# Patient Record
Sex: FEMALE | Race: WHITE
[De-identification: names, ages, dates, MRNs, and addresses within clinical notes are randomized per-mention and may not be internally consistent; named-entity substitution may affect disease eponyms.]

---

## 2019-01-08 ENCOUNTER — HOSPITAL ENCOUNTER (INPATIENT)
Dept: HOSPITAL 72 - EMR | Age: 59
LOS: 4 days | Discharge: NURSING, REHAB OR LONG-TERM CARE | DRG: 315 | End: 2019-01-12
Payer: MEDICARE

## 2019-01-08 VITALS — SYSTOLIC BLOOD PRESSURE: 108 MMHG | DIASTOLIC BLOOD PRESSURE: 49 MMHG

## 2019-01-08 VITALS — BODY MASS INDEX: 22.68 KG/M2 | HEIGHT: 63 IN | WEIGHT: 128 LBS

## 2019-01-08 DIAGNOSIS — I99.8: ICD-10-CM

## 2019-01-08 DIAGNOSIS — R00.1: ICD-10-CM

## 2019-01-08 DIAGNOSIS — M62.461: ICD-10-CM

## 2019-01-08 DIAGNOSIS — G40.909: ICD-10-CM

## 2019-01-08 DIAGNOSIS — I73.9: ICD-10-CM

## 2019-01-08 DIAGNOSIS — Q90.9: ICD-10-CM

## 2019-01-08 DIAGNOSIS — I45.89: ICD-10-CM

## 2019-01-08 DIAGNOSIS — E03.9: ICD-10-CM

## 2019-01-08 DIAGNOSIS — I95.9: Primary | ICD-10-CM

## 2019-01-08 DIAGNOSIS — Z74.01: ICD-10-CM

## 2019-01-08 DIAGNOSIS — M62.462: ICD-10-CM

## 2019-01-08 DIAGNOSIS — Z43.1: ICD-10-CM

## 2019-01-08 DIAGNOSIS — L89.159: ICD-10-CM

## 2019-01-08 PROCEDURE — 84443 ASSAY THYROID STIM HORMONE: CPT

## 2019-01-08 PROCEDURE — 86140 C-REACTIVE PROTEIN: CPT

## 2019-01-08 PROCEDURE — 93926 LOWER EXTREMITY STUDY: CPT

## 2019-01-08 PROCEDURE — 96360 HYDRATION IV INFUSION INIT: CPT

## 2019-01-08 PROCEDURE — 82962 GLUCOSE BLOOD TEST: CPT

## 2019-01-08 PROCEDURE — 93306 TTE W/DOPPLER COMPLETE: CPT

## 2019-01-08 PROCEDURE — 83880 ASSAY OF NATRIURETIC PEPTIDE: CPT

## 2019-01-08 PROCEDURE — 82550 ASSAY OF CK (CPK): CPT

## 2019-01-08 PROCEDURE — 82553 CREATINE MB FRACTION: CPT

## 2019-01-08 PROCEDURE — 93971 EXTREMITY STUDY: CPT

## 2019-01-08 PROCEDURE — 87081 CULTURE SCREEN ONLY: CPT

## 2019-01-08 PROCEDURE — 94664 DEMO&/EVAL PT USE INHALER: CPT

## 2019-01-08 PROCEDURE — 80061 LIPID PANEL: CPT

## 2019-01-08 PROCEDURE — 96361 HYDRATE IV INFUSION ADD-ON: CPT

## 2019-01-08 PROCEDURE — 36415 COLL VENOUS BLD VENIPUNCTURE: CPT

## 2019-01-08 PROCEDURE — 84481 FREE ASSAY (FT-3): CPT

## 2019-01-08 PROCEDURE — 99291 CRITICAL CARE FIRST HOUR: CPT

## 2019-01-08 PROCEDURE — 93005 ELECTROCARDIOGRAM TRACING: CPT

## 2019-01-08 PROCEDURE — 85730 THROMBOPLASTIN TIME PARTIAL: CPT

## 2019-01-08 PROCEDURE — 85610 PROTHROMBIN TIME: CPT

## 2019-01-08 PROCEDURE — 71045 X-RAY EXAM CHEST 1 VIEW: CPT

## 2019-01-08 PROCEDURE — 84484 ASSAY OF TROPONIN QUANT: CPT

## 2019-01-08 PROCEDURE — 84439 ASSAY OF FREE THYROXINE: CPT

## 2019-01-08 PROCEDURE — 80053 COMPREHEN METABOLIC PANEL: CPT

## 2019-01-08 PROCEDURE — 85025 COMPLETE CBC W/AUTO DIFF WBC: CPT

## 2019-01-09 VITALS — DIASTOLIC BLOOD PRESSURE: 62 MMHG | SYSTOLIC BLOOD PRESSURE: 99 MMHG

## 2019-01-09 VITALS — SYSTOLIC BLOOD PRESSURE: 109 MMHG | DIASTOLIC BLOOD PRESSURE: 55 MMHG

## 2019-01-09 VITALS — DIASTOLIC BLOOD PRESSURE: 55 MMHG | SYSTOLIC BLOOD PRESSURE: 104 MMHG

## 2019-01-09 VITALS — DIASTOLIC BLOOD PRESSURE: 62 MMHG | SYSTOLIC BLOOD PRESSURE: 101 MMHG

## 2019-01-09 VITALS — SYSTOLIC BLOOD PRESSURE: 98 MMHG | DIASTOLIC BLOOD PRESSURE: 51 MMHG

## 2019-01-09 VITALS — DIASTOLIC BLOOD PRESSURE: 57 MMHG | SYSTOLIC BLOOD PRESSURE: 121 MMHG

## 2019-01-09 VITALS — SYSTOLIC BLOOD PRESSURE: 100 MMHG | DIASTOLIC BLOOD PRESSURE: 63 MMHG

## 2019-01-09 LAB
ADD MANUAL DIFF: NO
ALBUMIN SERPL-MCNC: 2.5 G/DL (ref 3.4–5)
ALBUMIN/GLOB SERPL: 0.5 {RATIO} (ref 1–2.7)
ALP SERPL-CCNC: 58 U/L (ref 46–116)
ALT SERPL-CCNC: 20 U/L (ref 12–78)
ANION GAP SERPL CALC-SCNC: 7 MMOL/L (ref 5–15)
APTT BLD: 22 SEC (ref 23–33)
AST SERPL-CCNC: 24 U/L (ref 15–37)
BASOPHILS NFR BLD AUTO: 1.6 % (ref 0–2)
BILIRUB SERPL-MCNC: 0.3 MG/DL (ref 0.2–1)
BUN SERPL-MCNC: 9 MG/DL (ref 7–18)
CALCIUM SERPL-MCNC: 9.2 MG/DL (ref 8.5–10.1)
CHLORIDE SERPL-SCNC: 107 MMOL/L (ref 98–107)
CK MB SERPL-MCNC: 0.4 NG/ML (ref 0–3.6)
CK SERPL-CCNC: 45 U/L (ref 26–140)
CO2 SERPL-SCNC: 24 MMOL/L (ref 21–32)
CREAT SERPL-MCNC: 0.6 MG/DL (ref 0.55–1.3)
EOSINOPHIL NFR BLD AUTO: 1.5 % (ref 0–3)
ERYTHROCYTE [DISTWIDTH] IN BLOOD BY AUTOMATED COUNT: 13.9 % (ref 11.6–14.8)
GLOBULIN SER-MCNC: 5 G/DL
HCT VFR BLD CALC: 45.8 % (ref 37–47)
HGB BLD-MCNC: 15 G/DL (ref 12–16)
INR PPP: 1 (ref 0.9–1.1)
LYMPHOCYTES NFR BLD AUTO: 49.9 % (ref 20–45)
MCV RBC AUTO: 104 FL (ref 80–99)
MONOCYTES NFR BLD AUTO: 9.8 % (ref 1–10)
NEUTROPHILS NFR BLD AUTO: 37.2 % (ref 45–75)
PLATELET # BLD: 304 K/UL (ref 150–450)
POTASSIUM SERPL-SCNC: 4.8 MMOL/L (ref 3.5–5.1)
RBC # BLD AUTO: 4.38 M/UL (ref 4.2–5.4)
SODIUM SERPL-SCNC: 138 MMOL/L (ref 136–145)
WBC # BLD AUTO: 8.4 K/UL (ref 4.8–10.8)

## 2019-01-09 RX ADMIN — LEVETIRACETAM SCH MG: 100 SOLUTION ORAL at 21:00

## 2019-01-09 RX ADMIN — ASPIRIN 81 MG SCH MG: 81 TABLET ORAL at 08:57

## 2019-01-09 RX ADMIN — HEPARIN SODIUM SCH UNITS: 5000 INJECTION INTRAVENOUS; SUBCUTANEOUS at 21:00

## 2019-01-09 RX ADMIN — INSULIN ASPART SCH UNITS: 100 INJECTION, SOLUTION INTRAVENOUS; SUBCUTANEOUS at 12:00

## 2019-01-09 RX ADMIN — DIVALPROEX SODIUM SCH MG: 125 CAPSULE ORAL at 21:00

## 2019-01-09 RX ADMIN — INSULIN ASPART SCH UNITS: 100 INJECTION, SOLUTION INTRAVENOUS; SUBCUTANEOUS at 17:41

## 2019-01-09 RX ADMIN — HEPARIN SODIUM SCH UNITS: 5000 INJECTION INTRAVENOUS; SUBCUTANEOUS at 08:58

## 2019-01-09 NOTE — CARDIOLOGY PROGRESS NOTE
Assessment/Plan


Assessment/Plan


[id number]





Objective





Last 24 Hour Vital Signs








  Date Time  Temp Pulse Resp B/P (MAP) Pulse Ox O2 Delivery O2 Flow Rate FiO2


 


1/9/19 16:00  53      


 


1/9/19 12:00  56      


 


1/9/19 12:00 97.2 71 20 109/55 (73) 95   


 


1/9/19 10:07 98.3 63 16 104/55 100 Room Air  


 


1/9/19 09:01      Room Air  


 


1/9/19 08:41  57      


 


1/9/19 08:07 97.0 62 21 121/57 (78) 97   


 


1/9/19 06:15 98.3 63 16 104/55 100 Room Air  


 


1/9/19 04:30 98.7 67 18 100/63 99 Room Air  


 


1/9/19 02:15 98.3 61 21 98/51 99 Room Air  


 


1/9/19 01:00 98.1 63 18 99/62 99 Room Air  


 


1/9/19 00:15 98.1 60 14 101/62 99 Room Air  


 


1/8/19 22:00 97.5 58 14 108/49 97 Room Air  











Laboratory Tests








Test


  1/9/19


04:00


 


White Blood Count


  8.4 K/UL


(4.8-10.8)


 


Red Blood Count


  4.38 M/UL


(4.20-5.40)


 


Hemoglobin


  15.0 G/DL


(12.0-16.0)


 


Hematocrit


  45.8 %


(37.0-47.0)


 


Mean Corpuscular Volume


  104 FL (80-99)


H


 


Mean Corpuscular Hemoglobin


  34.3 PG


(27.0-31.0)  H


 


Mean Corpuscular Hemoglobin


Concent 32.8 G/DL


(32.0-36.0)


 


Red Cell Distribution Width


  13.9 %


(11.6-14.8)


 


Platelet Count


  304 K/UL


(150-450)


 


Mean Platelet Volume


  6.2 FL


(6.5-10.1)  L


 


Neutrophils (%) (Auto)


  37.2 %


(45.0-75.0)  L


 


Lymphocytes (%) (Auto)


  49.9 %


(20.0-45.0)  H


 


Monocytes (%) (Auto)


  9.8 %


(1.0-10.0)


 


Eosinophils (%) (Auto)


  1.5 %


(0.0-3.0)


 


Basophils (%) (Auto)


  1.6 %


(0.0-2.0)


 


Prothrombin Time


  10.4 SEC


(9.30-11.50)


 


Prothromb Time International


Ratio 1.0 (0.9-1.1)  


 


 


Activated Partial


Thromboplast Time 22 SEC (23-33)


L


 


Sodium Level


  138 MMOL/L


(136-145)


 


Potassium Level


  4.8 MMOL/L


(3.5-5.1)


 


Chloride Level


  107 MMOL/L


()


 


Carbon Dioxide Level


  24 MMOL/L


(21-32)


 


Anion Gap


  7 mmol/L


(5-15)


 


Blood Urea Nitrogen


  9 mg/dL (7-18)


 


 


Creatinine


  0.6 MG/DL


(0.55-1.30)


 


Estimat Glomerular Filtration


Rate > 60 mL/min


(>60)


 


Glucose Level


  76 MG/DL


()


 


Calcium Level


  9.2 MG/DL


(8.5-10.1)


 


Total Bilirubin


  0.3 MG/DL


(0.2-1.0)


 


Aspartate Amino Transf


(AST/SGOT) 24 U/L (15-37)


 


 


Alanine Aminotransferase


(ALT/SGPT) 20 U/L (12-78)


 


 


Alkaline Phosphatase


  58 U/L


()


 


Total Creatine Kinase


  45 U/L


()


 


Creatine Kinase MB


  0.4 NG/ML


(0.0-3.6)


 


Creatine Kinase MB Relative


Index 0.8  


 


 


Troponin I


  0.005 ng/mL


(0.000-0.056)


 


Pro-B-Type Natriuretic Peptide


  65 pg/mL


(0-125)


 


Total Protein


  7.5 G/DL


(6.4-8.2)


 


Albumin


  2.5 G/DL


(3.4-5.0)  L


 


Globulin 5.0 g/dL  


 


Albumin/Globulin Ratio


  0.5 (1.0-2.7)


L

















Constantine Jorgensen MD Jan 9, 2019 20:16

## 2019-01-09 NOTE — DIAGNOSTIC IMAGING REPORT
Indication: Left leg pain

 

Technique: Limited study performed, only the left common femoral vein, downstream

superficial femoral vein could be interrogated due to patient being very contracted

 

Comparison: none

 

Findings: In the interrogated veins, no evidence of intraluminal thrombus is

demonstrated. Normal compressibility. Normal phasic Doppler waveforms.,

 

Impression: Very limited exam. No evidence of common femoral or downstream femoral

venous thrombosis demonstrated

 

This agrees with the preliminary [facility]

[facility].

## 2019-01-09 NOTE — CARDIOLOGY REPORT
--------------- APPROVED REPORT --------------





EKG Measurement

Heart [id number]

GA 

[id number]

[id number]

[id number]

[id number]





Normal sinus rhythm

Low voltage QRS

Right bundle branch block

Abnormal ECG

## 2019-01-09 NOTE — EMERGENCY ROOM REPORT
History of Present Illness


General


Chief Complaint:  General Complaint


Source:  Medical Record, EMS





Present Illness


HPI


58-year-old female presents ED for evaluation.  Patient was sent in for 

"cardiology evaluation".  Coming from skilled nursing facility.  Patient has 

cognitive delay.  Per nursing staff patient was sent in because patient needs 

cardiology evaluation and patient has peripheral arterial disease.  Upon 

arrival patient showing no signs of distress.  No reported chest pain or 

shortness of breath.  Patient initially hypotensive on arrival.  No fevers or 

chills.  No other aggravating relieving factors.  No other associated symptoms


Allergies:  


Coded Allergies:  


     No Known Allergies (Unverified , 1/8/19)





Patient History


Past Medical History:  seizures


Past Surgical History:  none


Pertinent Family History:  none


Social History:  Denies: smoking, alcohol use, drug use


Pregnant Now:  No


Immunizations:  UTD


Reviewed Nursing Documentation:  PMH: Agreed; PSxH: Agreed





Nursing Documentation-PMH


Hx Diabetes:  No - Hypothyroid


Hx Seizures:  Yes





Review of Systems


All Other Systems:  limited





Physical Exam





Vital Signs








  Date Time  Temp Pulse Resp B/P (MAP) Pulse Ox O2 Delivery O2 Flow Rate FiO2


 


1/8/19 19:35 97.5 56 18 91/56 94 Room Air  








Sp02 EP Interpretation:  reviewed, normal


General Appearance:  other - cognitive delay


Head:  normocephalic


Eyes:  bilateral eye normal inspection, bilateral eye PERRL


ENT:  normal ENT inspection


Neck:  normal inspection


Respiratory:  chest non-tender, lungs clear, normal breath sounds, speaking 

full sentences


Cardiovascular #1:  regular rate, rhythm, no edema


Gastrointestinal:  normal inspection


Rectal:  deferred


Genitourinary:  no CVA tenderness


Musculoskeletal:  other - contracted lower extremities


Neurologic:  other - cognitive delay


Psychiatric:  other - cognitive delay


Skin:  normal inspection


Lymphatic:  normal inspection





Procedures


Critical Care Time


Critical Care Time


i.  I feel this is a highly complex case requiring extensive working including 

EKG/Rhythm strip, Xray/CT/US, Blood/urine lab work, repeat exams while in ED, 


and administration of strong opiates/narcotics for pain control, admission to 

hospital or close patient follow up.  





Total time: 45 min bedside evaluation and treatment excludes procedures (EKG). 


Reason for critical care:  hypotension


Possible complications: hypotension, hypertension, MI, shock, arrhythmias, 

metabolic acidosis, end organ damage, respiratory failure. 


Interventions: labs, IVFs, EKG, CXR, venous/arterial duplex. 


Course: Patient sent in for cardiology evaluation.  Also noted have history of 

peripheral arterial disease.  Pulses noted in contracted extremities.  Venous 

and arterial duplex limited due to contracted limbs.  Difficult IV access 

obtained.  Labs unremarkable.  Patient hypotensive.  Responded with IV fluids


Consultations: nursing staff, EMS, family 


Performed by: Dr Oliveira


Tolerated well condition = serious





j.  because of unstable vital signs this patient had a condition that could 

potentially threaten life or limb.  I feel this is a critical patient who 

required my full attention while patient was considered critical.  Total 

Critical Care Time excluding procedures was greater than 35 minutes





Medical Decision Making


Diagnostic Impression:  


 Primary Impression:  


 Peripheral arterial disease


 Additional Impression:  


 Hypotension


 Qualified Codes:  I95.9 - Hypotension, unspecified


ER Course


Hospital Course 


57 yo F presents to ED for 'cardiology evaluation'.  also noted to have 

peripheral arterial disease





Differential diagnoses include: MI/unstable angina, ACS, arterial occlusion 





Clinical course


Patient placed on stretcher. on cardiac monitor. After initial history and 

physical I ordered labs, EKG, chest x-ray, venous/arterial duplex





labs reviewed- no leukocytosis, hb/hct stable, electrolyes ok, trop negative


EKG - NSR, no acute ischemic changes interpreted by me 


Chest x-ray- no acute process





venous and arterial duplex limited study due to contracted extremities.  there 

is a distal pulse in both extremities





initially hypotensive, but responded to IV hydration 





Case discussed with Dr. Cherry  and he agreed to accept the patient to his 

service for further care and support





I.  I feel this is a highly complex case requiring extensive working including 

EKG/Rhythm strip, Xray/CT/US, Blood/urine lab work, repeat exams while in ED, 

and administration of strong opiates/narcotics for pain control, admission to 

hospital or close patient follow up.  





Diagnosis - peripheral arterial disease, hypotension 





admitted to telemetry in serious condition





Labs








Test


  1/9/19


04:00


 


White Blood Count


  8.4 K/UL


(4.8-10.8)


 


Red Blood Count


  4.38 M/UL


(4.20-5.40)


 


Hemoglobin


  15.0 G/DL


(12.0-16.0)


 


Hematocrit


  45.8 %


(37.0-47.0)


 


Mean Corpuscular Volume 104 FL (80-99) 


 


Mean Corpuscular Hemoglobin


  34.3 PG


(27.0-31.0)


 


Mean Corpuscular Hemoglobin


Concent 32.8 G/DL


(32.0-36.0)


 


Red Cell Distribution Width


  13.9 %


(11.6-14.8)


 


Platelet Count


  304 K/UL


(150-450)


 


Mean Platelet Volume


  6.2 FL


(6.5-10.1)


 


Neutrophils (%) (Auto)


  37.2 %


(45.0-75.0)


 


Lymphocytes (%) (Auto)


  49.9 %


(20.0-45.0)


 


Monocytes (%) (Auto)


  9.8 %


(1.0-10.0)


 


Eosinophils (%) (Auto)


  1.5 %


(0.0-3.0)


 


Basophils (%) (Auto)


  1.6 %


(0.0-2.0)


 


Prothrombin Time


  10.4 SEC


(9.30-11.50)


 


Prothromb Time International


Ratio 1.0 (0.9-1.1) 


 


 


Activated Partial


Thromboplast Time 22 SEC (23-33) 


 


 


Sodium Level


  138 MMOL/L


(136-145)


 


Potassium Level


  4.8 MMOL/L


(3.5-5.1)


 


Chloride Level


  107 MMOL/L


()


 


Carbon Dioxide Level


  24 MMOL/L


(21-32)


 


Anion Gap


  7 mmol/L


(5-15)


 


Blood Urea Nitrogen 9 mg/dL (7-18) 


 


Creatinine


  0.6 MG/DL


(0.55-1.30)


 


Estimat Glomerular Filtration


Rate > 60 mL/min


(>60)


 


Glucose Level


  76 MG/DL


()


 


Calcium Level


  9.2 MG/DL


(8.5-10.1)


 


Total Bilirubin


  0.3 MG/DL


(0.2-1.0)


 


Aspartate Amino Transf


(AST/SGOT) 24 U/L (15-37) 


 


 


Alanine Aminotransferase


(ALT/SGPT) 20 U/L (12-78) 


 


 


Alkaline Phosphatase


  58 U/L


()


 


Total Creatine Kinase


  45 U/L


()


 


Creatine Kinase MB


  0.4 NG/ML


(0.0-3.6)


 


Creatine Kinase MB Relative


Index 0.8 


 


 


Troponin I


  0.005 ng/mL


(0.000-0.056)


 


Pro-B-Type Natriuretic Peptide


  65 pg/mL


(0-125)


 


Total Protein


  7.5 G/DL


(6.4-8.2)


 


Albumin


  2.5 G/DL


(3.4-5.0)


 


Globulin 5.0 g/dL 


 


Albumin/Globulin Ratio 0.5 (1.0-2.7) 








EKG Diagnostic Results


Rate:  normal


Rhythm:  NSR


ST Segments:  no acute changes


ASA given to the pt in ED:  No





Rhythm Strip Diag. Results


EP Interpretation:  yes


Rhythm:  NSR, no PVC's, no ectopy





Chest X-Ray Diagnostic Results


Chest X-Ray Diagnostic Results :  


   Chest X-Ray Ordered:  Yes


   # of Views/Limited/Complete:  1 View


   Indication:  Other


   EP Interpretation:  Yes


   Interpretation:  no consolidation, no effusion, no pneumothorax, no acute 

cardiopulmonary disease


   Impression:  No acute disease


   Electronically Signed by:  Electronically signed by Gómez Oliveira MD





CT/MRI/US Diagnostic Results


CT/MRI/US Diagnostic Results :  


   Imaging Test Ordered:  venous and arterial duplex


   Impression


limited due to contracted extremities. patent flow in L thigh. unable to 

properly evaluate distal LLE and R leg.





Last Vital Signs








  Date Time  Temp Pulse Resp B/P (MAP) Pulse Ox O2 Delivery O2 Flow Rate FiO2


 


1/9/19 02:15 98.3 61 21 98/51 99 Room Air  








Status:  improved


Disposition:  ADMITTED AS INPATIENT


Condition:  Serious


Referrals:  


NON PHYSICIAN (PCP)











Gómez Oliveira MD Jan 9, 2019 05:39

## 2019-01-09 NOTE — DIAGNOSTIC IMAGING REPORT
Indication: Left leg pain

 

Technique: Grayscale and duplex imaging of the left lower extremity arteries. Note

that only limited images could be obtained, of the common femoral and proximal to mid

superficial femoral artery, patient contracture.

 

Comparison: none

 

Findings: Doppler waveforms at all visualized levels are biphasic with sharp systolic

peaks. Color Doppler demonstrates normal luminal patency.

 

Impression: Limited exam, as described No evidence of lower extremity arterial

insufficiency to the level of the mid superficial femoral artery. Distal disease not

excludable

## 2019-01-09 NOTE — DIAGNOSTIC IMAGING REPORT
Indication: Chest pain

 

Technique: One view of the chest

 

Comparison: none

 

Findings: Patient's chin obscures the upper mediastinum. The lungs and pleural spaces

are clear. Heart size is normal.

 

Impression: No acute process

## 2019-01-09 NOTE — CONSULTATION
DATE OF CONSULTATION:  01/09/2019

 CARDIAC CONSULTATION



CONSULTING PHYSICIAN:  Constantine Jorgensen M.D.



REFERRING PHYSICIAN:  Chano Cherry M.D.



REASON FOR EVALUATION:  Possible vascular insufficiency.



HISTORY OF PRESENT ILLNESS:  This is a middle-aged female, developmentally

delayed.  The patient is a resident of convalescent facility and was

transferred because of possibility of vascular insufficiency of the lower

extremities.  Consultation was requested.  The patient is not able to

provide any meaningful history whatsoever.



PAST MEDICAL HISTORY:  Positive for Down syndrome and _____ malnutrition,

status post PEG, peripheral vascular disease, sacral decubitus ulcers

being documented as part of her diagnoses.



MEDICATIONS:  Include Ativan, calcium, divalproex, Colace, Keppra,

Synthroid, MiraLAX, multivitamin, Robitussin, Tylenol, vitamin C, vitamin

D, and no other medical information is available.



ALLERGIES:  The patient is not allergic to any medications.



SOCIAL HISTORY:  She does not smoke or drink.  She lives in a convalescent

facility.



PHYSICAL EXAMINATION:

GENERAL:  Shows to be a middle-aged female, in no respiratory distress.

Poor dental hygiene.

VITAL SIGNS:  Blood pressure is 109/55 with heart rate between 53 and 71,

temperature 97.2.

NECK:  Supple.

LUNGS:  Clear to auscultation.

CARDIAC:  Regular rate and rhythm.

ABDOMEN:  Soft.  Difficult to examine because of contracted lower

extremities over the abdomen.

EXTREMITIES:  Contracted severely that there are significant abnormalities

of the toenail.  The patient does have palpable pulses in dorsalis pedis

bilaterally.

NEUROLOGICAL:  Awake and responsive.  Not communicative.



LABORATORY VALUES:  An echocardiogram was performed and showed ejection

fraction being normal.  No significant valve pathology was noted.  EKG

shows sinus rhythm, but with right bundle-branch conduction defect with

leftward axis.  No ST-T wave abnormalities.  Venous duplex study of the

lower extremities, limited views, does not show any significant problems

of the proximal veins of the lower extremities and arterial duplex of

lower extremity did not show inflow disease in the proximal portion of the

arteries, distal portion was not amenable to visualization secondary to

contractures.  A chest x-ray was performed in the emergency room that

showed no evidence of any acute process.  INR 1, PTT of 22.  Sodium is

138, potassium 4.0, chloride 107, bicarbonate 24, BUN of 9, creatinine

0.8.  Troponin 0.05.  ProBNP is only 65.  White count 8.4, hemoglobin 15,

and platelet count 304,000.



ASSESSMENT AND PLAN:

1. Right bundle-branch conduction defect.

2. Sinus bradycardia.

3. Severely contracted lower extremities.

4. Down syndrome.

5. History of sacral decubitus ulcers.



Dr. Cherry, this patient was seen in cardiac consultation.  Although

venous and arterial duplex of lower extremities are difficult to perform,

she does have palpable pulses of the lower extremities.  Her

echocardiogram appears to be normal LV function.  EKG shows _____ sinus

bradycardia, but no significant abnormalities are noted.  I have no

further recommendations from cardiac point of view at this time.

Discharge planning as per yourself.









  ______________________________________________

  Constantine Jorgensen M.D.





DR:  Anthony

D:  01/09/2019 20:04

T:  01/09/2019 21:41

JOB#:  [id number]

CC:

## 2019-01-09 NOTE — CARDIOLOGY REPORT
--------------- APPROVED REPORT --------------





EXAM: Two-dimensional and M-mode echocardiogram with Doppler and color Doppler.



M-Mode DIMENSIONS 

IVSd1.1 (0.7-1.1cm)Left Atrium (MM)2.5 (1.6-4.0cm)

LVDd5.7 (3.5-5.6cm)Aortic Root3.5 (2.0-3.7cm)

PWd0.8 (0.7-1.1cm)Aortic Cusp Exc.1.8 (1.5-2.0cm)

IVSs1.4 cm

LVDs3.7 (2.5-4.0cm)

PWs1.3 cm





Technically difficult study due to poor  acoustical windows.

Normal left ventricular chamber size, systolic function and wall motion to extent 

visualized.

Left ventricular ejection fraction estimated to be  55-60 %.

No evidence of left ventricular hypertrophy.

 Anterior Echo-free space, may be due to pericardial fat or effusion.

All other cardiac chamber sizes are within normal limits. 

Focal aortic valve sclerosis with adequate cusp excursion.

Thickened mitral valve leaflets with normal excursion.

Mitral annulus and aortic root calcification.

Pulmonic valve not well visualized.

Normal tricuspid valve structure. 

IVC at normal size with physiologic collapse.



A  color flow and spectral Doppler study was performed and revealed:

No aortic regurgitation.

Trace  mitral regurgitation.

Mitral diastolic velocities suggest reduced left ventricular relaxation c/w mild LV 

diastolic dysfunction (Grade I ). 

Trace  tricuspid regurgitation.

Tricuspid  systolic velocities suggests peak right ventricular systolic pressure of 10   

mmHg.

No pulmonic regurgitation present.

## 2019-01-09 NOTE — HISTORY AND PHYSICAL
History of Present Illness


General


Date patient seen:  Jan 9, 2019


Reason for Hospitalization:  General Complaint





Present Illness


HPI


58 year old female with hx trisomy 21, PEG feeding tube, seizures, hypothyroid, 

bed bound, nursing home resident brought in by ambulance from nursing home for 

cardiac and vascular evaluation. Pt cant give any history. It's unknown what 

triggered the transfer to ER for cardiac evaluation.  Pt was hypotensive in ER 

, therefore she is admitted to telemetry for further work up.


Allergies:  


Coded Allergies:  


     No Known Allergies (Unverified , 1/8/19)





Medication History


Scheduled


Ascorbic Acid* (Vitamin C*), 500 MG GT DAILY, (Reported)


Docusate Sodium* (Docusate Sodium*), 100 MG GT TWICE A DAY, (Reported)


Levetiracetam (Keppra), 750 MG GT TWICE A DAY, (Reported)


Levothyroxine Sodium* (Levothyroxine Sodium*), 75 MCG GT DAILY, (Reported)


Lorazepam* (Ativan*), 0.5 MG ORAL THREE TIMES A DAY, (Reported)


Magnesium Hydroxide* (Milk Of Magnesia*), 30 ML ORAL DAILY, (Reported)


Multivitamin With Minerals (Multivitamins With Minerals*), 1 TAB ORAL DAILY, (

Reported)


Polyethylene Glycol 3350* (Miralax*), 17 GM ORAL DAILY, (Reported)


Vitamin D (Vitamin D3), 100 UNITS GT DAILY, (Reported)





Scheduled PRN


Acetaminophen* (Acetaminophen 325MG Tablet*), 650 MG GT Q4H PRN for Mild Pain (

Pain Scale 1-3), (Reported)


Guaifenesin/Codeine Phos* (Robitussin Ac*), 1 TSP ORAL Q4H PRN for For Cough, (

Reported)





Miscellaneous Medications


Calcium Carbonate/Vitamin D3 (Calcium 500 + D Tablet), 1 EACH PO, (Reported)


Divalproex Sodium (Depakote Sprinkle), 500 MG PO, (Reported)





Patient History


Healthcare decision maker





Resuscitation status


Full Code


Advanced Directive on File








Past Medical/Surgical History


Past Medical/Surgical History:  


(1) Trisomy 21, Down syndrome


(2) Hypothyroidism


(3) Seizure disorder





Review of Systems


All Other Systems:  negative except mentioned in HPI





Physical Exam


General Appearance:  cachetic


Lines, tubes and drains:  peripheral


HEENT:  normocephalic


Neck:  non-tender, normal alignment


Respiratory/Chest:  chest wall non-tender, lungs clear


Cardiovascular/Chest:  normal peripheral pulses, normal rate


Abdomen:  normal bowel sounds, non tender


Genitourinary/Rectal:  normal genital exam


Extremities:  normal range of motion


Skin Exam:  normal pigmentation





Last 24 Hour Vital Signs








  Date Time  Temp Pulse Resp B/P (MAP) Pulse Ox O2 Delivery O2 Flow Rate FiO2


 


1/9/19 12:00 97.2 71 20 109/55 (73) 95   


 


1/9/19 10:07 98.3 63 16 104/55 100 Room Air  


 


1/9/19 09:01      Room Air  


 


1/9/19 08:07 97.0 62 21 121/57 (78) 97   


 


1/9/19 06:15 98.3 63 16 104/55 100 Room Air  


 


1/9/19 04:30 98.7 67 18 100/63 99 Room Air  


 


1/9/19 02:15 98.3 61 21 98/51 99 Room Air  


 


1/9/19 01:00 98.1 63 18 99/62 99 Room Air  


 


1/9/19 00:15 98.1 60 14 101/62 99 Room Air  


 


1/8/19 22:00 97.5 58 14 108/49 97 Room Air  


 


1/8/19 19:35 97.5 56 18 91/56 94 Room Air  











Laboratory Tests








Test


  1/9/19


04:00


 


White Blood Count


  8.4 K/UL


(4.8-10.8)


 


Red Blood Count


  4.38 M/UL


(4.20-5.40)


 


Hemoglobin


  15.0 G/DL


(12.0-16.0)


 


Hematocrit


  45.8 %


(37.0-47.0)


 


Mean Corpuscular Volume


  104 FL (80-99)


H


 


Mean Corpuscular Hemoglobin


  34.3 PG


(27.0-31.0)  H


 


Mean Corpuscular Hemoglobin


Concent 32.8 G/DL


(32.0-36.0)


 


Red Cell Distribution Width


  13.9 %


(11.6-14.8)


 


Platelet Count


  304 K/UL


(150-450)


 


Mean Platelet Volume


  6.2 FL


(6.5-10.1)  L


 


Neutrophils (%) (Auto)


  37.2 %


(45.0-75.0)  L


 


Lymphocytes (%) (Auto)


  49.9 %


(20.0-45.0)  H


 


Monocytes (%) (Auto)


  9.8 %


(1.0-10.0)


 


Eosinophils (%) (Auto)


  1.5 %


(0.0-3.0)


 


Basophils (%) (Auto)


  1.6 %


(0.0-2.0)


 


Prothrombin Time


  10.4 SEC


(9.30-11.50)


 


Prothromb Time International


Ratio 1.0 (0.9-1.1)  


 


 


Activated Partial


Thromboplast Time 22 SEC (23-33)


L


 


Sodium Level


  138 MMOL/L


(136-145)


 


Potassium Level


  4.8 MMOL/L


(3.5-5.1)


 


Chloride Level


  107 MMOL/L


()


 


Carbon Dioxide Level


  24 MMOL/L


(21-32)


 


Anion Gap


  7 mmol/L


(5-15)


 


Blood Urea Nitrogen


  9 mg/dL (7-18)


 


 


Creatinine


  0.6 MG/DL


(0.55-1.30)


 


Estimat Glomerular Filtration


Rate > 60 mL/min


(>60)


 


Glucose Level


  76 MG/DL


()


 


Calcium Level


  9.2 MG/DL


(8.5-10.1)


 


Total Bilirubin


  0.3 MG/DL


(0.2-1.0)


 


Aspartate Amino Transf


(AST/SGOT) 24 U/L (15-37)


 


 


Alanine Aminotransferase


(ALT/SGPT) 20 U/L (12-78)


 


 


Alkaline Phosphatase


  58 U/L


()


 


Total Creatine Kinase


  45 U/L


()


 


Creatine Kinase MB


  0.4 NG/ML


(0.0-3.6)


 


Creatine Kinase MB Relative


Index 0.8  


 


 


Troponin I


  0.005 ng/mL


(0.000-0.056)


 


Pro-B-Type Natriuretic Peptide


  65 pg/mL


(0-125)


 


Total Protein


  7.5 G/DL


(6.4-8.2)


 


Albumin


  2.5 G/DL


(3.4-5.0)  L


 


Globulin 5.0 g/dL  


 


Albumin/Globulin Ratio


  0.5 (1.0-2.7)


L








Height (Feet):  5


Height (Inches):  3.00


Weight (Pounds):  129


Medications





Current Medications








 Medications


  (Trade)  Dose


 Ordered  Sig/Didi


 Route


 PRN Reason  Start Time


 Stop Time Status Last Admin


Dose Admin


 


 Acetaminophen


  (Tylenol)  650 mg  Q4H  PRN


 ORAL


 FEVER  1/9/19 07:00


 2/8/19 06:59   


 


 


 Albuterol/


 Ipratropium


  (Albuterol/


 Ipratropium)  3 ml  Q4H  PRN


 HHN


 Shortness of Breath  1/9/19 07:00


 1/14/19 06:59   


 


 


 Aspirin


  (ASA)  162 mg  DAILY


 ORAL


   1/9/19 09:00


 2/8/19 08:59  1/9/19 08:57


 


 


 Dextrose


  (Dextrose 50%)  25 ml  Q30M  PRN


 IV


 Hypoglycemia  1/9/19 11:15


 2/8/19 11:14   


 


 


 Dextrose


  (Dextrose 50%)  50 ml  Q30M  PRN


 IV


 Hypoglycemia  1/9/19 11:15


 2/8/19 11:14   


 


 


 Diltiazem HCl


  (Cardizem)  10 mg  Q1H  PRN


 IV


 heart rate more than 120,   1/9/19 07:00


 2/8/19 06:59   


 


 


 Divalproex Sodium


  (Depakote


 Sprinkles)  500 mg  Q12HR


 GT


   1/9/19 21:00


 2/8/19 20:59   


 


 


 Enalaprilat


  (Vasotec)  2.5 mg  Q6H  PRN


 IV


 sbp more than 160  1/9/19 07:00


 2/8/19 06:59   


 


 


 Heparin Sodium


  (Porcine)


  (Heparin 5000


 units/ml)  5,000 units  EVERY 12  HOURS


 SUBQ


   1/9/19 09:00


 2/8/19 08:59  1/9/19 08:58


 


 


 Insulin Aspart


  (NovoLOG)    Q6HR


 SUBQ


   1/9/19 12:00


 2/8/19 11:59   


 


 


 Ketorolac


 Tromethamine


  (Toradol 30mg)  30 mg  Q6H  PRN


 IV


 moderate pain ( 4-6)  1/9/19 07:00


 1/14/19 06:59   


 


 


 Levetiracetam


  (Keppra)  750 mg  Q12HR


 GT


   1/9/19 21:00


 2/8/19 20:59   


 


 


 Levothyroxine


 Sodium


  (Synthroid)  75 mcg  DAILY


 GT


   1/9/19 12:30


 2/8/19 12:29 UNV  


 


 


 Levothyroxine


 Sodium


  (Synthroid)  75 mcg  DAILY@0630


 ORAL


   1/10/19 06:30


 2/9/19 06:29   


 


 


 Morphine Sulfate


  (Morphine


 Sulfate)  2 mg  Q4H  PRN


 IVP


 severe Pain (Pain Scale 7-10)  1/9/19 07:00


 1/16/19 06:59   


 


 


 Nitroglycerin


  (Ntg)  0.4 mg  Q5M  PRN


 SL


 Prn Chest Pain  1/9/19 07:00


 2/8/19 06:59   


 


 


 Ondansetron HCl


  (Zofran)  4 mg  Q6H  PRN


 IVP


 Nausea & Vomiting  1/9/19 07:00


 2/8/19 06:59   


 


 


 Polyethylene


 Glycol


  (Miralax)  17 gm  DAILYPRN  PRN


 ORAL


 Constipation  1/9/19 07:00


 2/8/19 06:59   


 


 


 Temazepam


  (Restoril)  15 mg  HSPRN  PRN


 ORAL


 Insomnia  1/9/19 07:00


 1/16/19 06:59   


 











Assessment/Plan


Problem List:  


(1) Hypotension


ICD Codes:  I95.9 - Hypotension, unspecified


SNOMED:  78010760, 982084133


Qualifiers:  


   Qualified Codes:  I95.9 - Hypotension, unspecified


(2) Peripheral arterial disease


ICD Codes:  I73.9 - Peripheral vascular disease, unspecified


SNOMED:  893909880, 259481359


(3) Seizure disorder


ICD Codes:  G40.909 - Epilepsy, unspecified, not intractable, without status 

epilepticus


SNOMED:  736139243


(4) Hypothyroidism


ICD Codes:  E03.9 - Hypothyroidism, unspecified


SNOMED:  17110704


(5) Trisomy 21, Down syndrome


ICD Codes:  Q90.9 - Down syndrome, unspecified


SNOMED:  81005833


Assessment/Plan


telemetry monitoring


2D echo


cardiology evaluation


nutrition evaluation


symptomatic treatment


synthyroid replacement











Chano Cherry MD Jan 9, 2019 12:41

## 2019-01-10 VITALS — DIASTOLIC BLOOD PRESSURE: 59 MMHG | SYSTOLIC BLOOD PRESSURE: 100 MMHG

## 2019-01-10 VITALS — SYSTOLIC BLOOD PRESSURE: 104 MMHG | DIASTOLIC BLOOD PRESSURE: 68 MMHG

## 2019-01-10 VITALS — DIASTOLIC BLOOD PRESSURE: 63 MMHG | SYSTOLIC BLOOD PRESSURE: 114 MMHG

## 2019-01-10 VITALS — DIASTOLIC BLOOD PRESSURE: 71 MMHG | SYSTOLIC BLOOD PRESSURE: 113 MMHG

## 2019-01-10 LAB
ADD MANUAL DIFF: NO
APTT BLD: 30 SEC (ref 23–33)
BASOPHILS NFR BLD AUTO: 1.5 % (ref 0–2)
CHOLEST SERPL-MCNC: 142 MG/DL (ref ?–200)
EOSINOPHIL NFR BLD AUTO: 1.6 % (ref 0–3)
ERYTHROCYTE [DISTWIDTH] IN BLOOD BY AUTOMATED COUNT: 13.6 % (ref 11.6–14.8)
HCT VFR BLD CALC: 39.8 % (ref 37–47)
HDLC SERPL-MCNC: 32 MG/DL (ref 40–60)
HGB BLD-MCNC: 13.3 G/DL (ref 12–16)
INR PPP: 1 (ref 0.9–1.1)
LYMPHOCYTES NFR BLD AUTO: 51.5 % (ref 20–45)
MCV RBC AUTO: 102 FL (ref 80–99)
MONOCYTES NFR BLD AUTO: 12.9 % (ref 1–10)
NEUTROPHILS NFR BLD AUTO: 32.6 % (ref 45–75)
PLATELET # BLD: 353 K/UL (ref 150–450)
RBC # BLD AUTO: 3.9 M/UL (ref 4.2–5.4)
TRIGL SERPL-MCNC: 94 MG/DL (ref 30–150)
WBC # BLD AUTO: 6.4 K/UL (ref 4.8–10.8)

## 2019-01-10 RX ADMIN — DIVALPROEX SODIUM SCH MG: 125 CAPSULE ORAL at 20:50

## 2019-01-10 RX ADMIN — LEVETIRACETAM SCH MG: 100 SOLUTION ORAL at 08:21

## 2019-01-10 RX ADMIN — DIVALPROEX SODIUM SCH MG: 125 CAPSULE ORAL at 08:22

## 2019-01-10 RX ADMIN — INSULIN ASPART SCH UNITS: 100 INJECTION, SOLUTION INTRAVENOUS; SUBCUTANEOUS at 18:00

## 2019-01-10 RX ADMIN — INSULIN ASPART SCH UNITS: 100 INJECTION, SOLUTION INTRAVENOUS; SUBCUTANEOUS at 06:00

## 2019-01-10 RX ADMIN — INSULIN ASPART SCH UNITS: 100 INJECTION, SOLUTION INTRAVENOUS; SUBCUTANEOUS at 11:23

## 2019-01-10 RX ADMIN — INSULIN ASPART SCH UNITS: 100 INJECTION, SOLUTION INTRAVENOUS; SUBCUTANEOUS at 00:00

## 2019-01-10 RX ADMIN — ASPIRIN 81 MG SCH MG: 81 TABLET ORAL at 08:22

## 2019-01-10 RX ADMIN — LEVETIRACETAM SCH MG: 100 SOLUTION ORAL at 20:48

## 2019-01-10 RX ADMIN — HEPARIN SODIUM SCH UNITS: 5000 INJECTION INTRAVENOUS; SUBCUTANEOUS at 20:50

## 2019-01-10 RX ADMIN — HEPARIN SODIUM SCH UNITS: 5000 INJECTION INTRAVENOUS; SUBCUTANEOUS at 08:20

## 2019-01-10 NOTE — CARDIOLOGY PROGRESS NOTE
Assessment/Plan


Assessment/Plan


1. Right bundle-branch conduction defect.


2. Sinus bradycardia.


3. Severely contracted lower extremities.


4. Down syndrome.


5. History of sacral decubitus ulcers.








noted synthorid dose increase 


tele sinus 


no new labs





Subjective


ROS Limited/Unobtainable:  Yes





Objective





Last 24 Hour Vital Signs








  Date Time  Temp Pulse Resp B/P (MAP) Pulse Ox O2 Delivery O2 Flow Rate FiO2


 


1/10/19 16:00 97.0 85 19 114/63 (80) 96   


 


1/10/19 16:00  59      


 


1/10/19 12:00 97.0 48 20 113/71 (85) 96   


 


1/10/19 12:00  72      


 


1/10/19 09:00      Room Air  


 


1/10/19 08:50  60 18     21


 


1/10/19 08:00  53      


 


1/10/19 08:00 97.8 60 20 100/59 (73) 95   


 


1/10/19 04:00  64      


 


1/10/19 00:00  57      


 


1/9/19 21:00      Room Air  








General Appearance:  no apparent distress, alert


Cardiovascular:  normal rate


Respiratory/Chest:  lungs clear, normal breath sounds


Abdomen:  normal bowel sounds, non tender, soft


Extremities:  no swelling





Laboratory Tests








Test


  1/10/19


06:52


 


White Blood Count


  6.4 K/UL


(4.8-10.8)


 


Red Blood Count


  3.90 M/UL


(4.20-5.40)  L


 


Hemoglobin


  13.3 G/DL


(12.0-16.0)


 


Hematocrit


  39.8 %


(37.0-47.0)


 


Mean Corpuscular Volume


  102 FL (80-99)


H


 


Mean Corpuscular Hemoglobin


  34.1 PG


(27.0-31.0)  H


 


Mean Corpuscular Hemoglobin


Concent 33.5 G/DL


(32.0-36.0)


 


Red Cell Distribution Width


  13.6 %


(11.6-14.8)


 


Platelet Count


  353 K/UL


(150-450)


 


Mean Platelet Volume


  6.8 FL


(6.5-10.1)


 


Neutrophils (%) (Auto)


  32.6 %


(45.0-75.0)  L


 


Lymphocytes (%) (Auto)


  51.5 %


(20.0-45.0)  H


 


Monocytes (%) (Auto)


  12.9 %


(1.0-10.0)  H


 


Eosinophils (%) (Auto)


  1.6 %


(0.0-3.0)


 


Basophils (%) (Auto)


  1.5 %


(0.0-2.0)


 


Prothrombin Time


  10.8 SEC


(9.30-11.50)


 


Prothromb Time International


Ratio 1.0 (0.9-1.1)  


 


 


Activated Partial


Thromboplast Time 30 SEC (23-33)


 


 


Troponin I


  0.000 ng/mL


(0.000-0.056)


 


C-Reactive Protein,


Quantitative 2.6 mg/dL


(0.00-0.90)  H


 


Triglycerides Level


  94 MG/DL


()


 


Cholesterol Level


  142 MG/DL (<


200)


 


LDL Cholesterol


  91 mg/dL


(<100)


 


HDL Cholesterol


  32 MG/DL


(40-60)  L


 


Cholesterol/HDL Ratio 4.4 (3.3-4.4)  


 


Thyroid Stimulating Hormone


(TSH) 6.754 uiU/mL


(0.358-3.740)


 


Free Thyroxine


  0.92 NG/DL


(0.76-1.46)


 


Free Triiodothyronine


  1.5 pg/mL


(2.3-4.2)  L











Microbiology








 Date/Time


Source Procedure


Growth Status


 


 


 1/9/19 06:00


Nasal Nares MRSA Culture - Final


Staphylococcus Aureus - Mrsa Complete

















Constantine Jorgensen MD Giovanni 10, 2019 20:10

## 2019-01-10 NOTE — CONSULTATION
History of Present Illness


General


Chief Complaint:  General Complaint





Present Illness


HPI


59 yo female with hx of down syndrome who was transferred via vascular 

insufficiency of the lower extremities. The patient was confused and he was not 

able to


provide any meaningful history. the pt has been suffering from DD, anxiety and 

mood lability. the pt has been taking Ativan and Depakote outside of the 

hospital . the pt is manageable however has episodes of agitation


Allergies:  


Coded Allergies:  


     No Known Allergies (Unverified , 1/8/19)





Medication History


Scheduled


Ascorbic Acid* (Vitamin C*), 500 MG GT DAILY, (Reported)


Docusate Sodium* (Docusate Sodium*), 100 MG GT TWICE A DAY, (Reported)


Levetiracetam (Keppra), 750 MG GT TWICE A DAY, (Reported)


Levothyroxine Sodium* (Levothyroxine Sodium*), 75 MCG GT DAILY, (Reported)


Lorazepam* (Ativan*), 0.5 MG ORAL THREE TIMES A DAY, (Reported)


Magnesium Hydroxide* (Milk Of Magnesia*), 30 ML ORAL DAILY, (Reported)


Multivitamin With Minerals (Multivitamins With Minerals*), 1 TAB ORAL DAILY, (

Reported)


Polyethylene Glycol 3350* (Miralax*), 17 GM ORAL DAILY, (Reported)


Vitamin D (Vitamin D3), 100 UNITS GT DAILY, (Reported)





Scheduled PRN


Acetaminophen* (Acetaminophen 325MG Tablet*), 650 MG GT Q4H PRN for Mild Pain (

Pain Scale 1-3), (Reported)


Guaifenesin/Codeine Phos* (Robitussin Ac*), 1 TSP ORAL Q4H PRN for For Cough, (

Reported)





Miscellaneous Medications


Calcium Carbonate/Vitamin D3 (Calcium 500 + D Tablet), 1 EACH PO, (Reported)


Divalproex Sodium (Depakote Sprinkle), 500 MG PO, (Reported)





Patient History


Limited by:  medical condition


History Provided By:  Medical Record, PMD


Healthcare decision maker





Resuscitation status


Full Code


Advanced Directive on File








Past Medical/Surgical History


Past Medical/Surgical History:  


(1) Trisomy 21, Down syndrome


(2) Hypotension


(3) Peripheral arterial disease


(4) Seizure disorder


(5) Hypothyroidism





Review of Systems


Psychiatric:  Reports: emotional problems





Physical Exam


General Appearance:  alert, confused, agitated, overweight





Last 24 Hour Vital Signs








  Date Time  Temp Pulse Resp B/P (MAP) Pulse Ox O2 Delivery O2 Flow Rate FiO2


 


1/10/19 21:21  69 18   Room Air  21


 


1/10/19 21:00      Room Air  


 


1/10/19 20:00 97.7 74 18 104/68 (80) 92   


 


1/10/19 20:00  74      


 


1/10/19 16:00 97.0 85 19 114/63 (80) 96   


 


1/10/19 16:00  59      


 


1/10/19 12:00 97.0 48 20 113/71 (85) 96   


 


1/10/19 12:00  72      


 


1/10/19 09:00      Room Air  


 


1/10/19 08:50  60 18     21


 


1/10/19 08:00  53      


 


1/10/19 08:00 97.8 60 20 100/59 (73) 95   


 


1/10/19 04:00  64      


 


1/10/19 00:00  57      











Laboratory Tests








Test


  1/10/19


06:52


 


White Blood Count


  6.4 K/UL


(4.8-10.8)


 


Red Blood Count


  3.90 M/UL


(4.20-5.40)  L


 


Hemoglobin


  13.3 G/DL


(12.0-16.0)


 


Hematocrit


  39.8 %


(37.0-47.0)


 


Mean Corpuscular Volume


  102 FL (80-99)


H


 


Mean Corpuscular Hemoglobin


  34.1 PG


(27.0-31.0)  H


 


Mean Corpuscular Hemoglobin


Concent 33.5 G/DL


(32.0-36.0)


 


Red Cell Distribution Width


  13.6 %


(11.6-14.8)


 


Platelet Count


  353 K/UL


(150-450)


 


Mean Platelet Volume


  6.8 FL


(6.5-10.1)


 


Neutrophils (%) (Auto)


  32.6 %


(45.0-75.0)  L


 


Lymphocytes (%) (Auto)


  51.5 %


(20.0-45.0)  H


 


Monocytes (%) (Auto)


  12.9 %


(1.0-10.0)  H


 


Eosinophils (%) (Auto)


  1.6 %


(0.0-3.0)


 


Basophils (%) (Auto)


  1.5 %


(0.0-2.0)


 


Prothrombin Time


  10.8 SEC


(9.30-11.50)


 


Prothromb Time International


Ratio 1.0 (0.9-1.1)  


 


 


Activated Partial


Thromboplast Time 30 SEC (23-33)


 


 


Troponin I


  0.000 ng/mL


(0.000-0.056)


 


C-Reactive Protein,


Quantitative 2.6 mg/dL


(0.00-0.90)  H


 


Triglycerides Level


  94 MG/DL


()


 


Cholesterol Level


  142 MG/DL (<


200)


 


LDL Cholesterol


  91 mg/dL


(<100)


 


HDL Cholesterol


  32 MG/DL


(40-60)  L


 


Cholesterol/HDL Ratio 4.4 (3.3-4.4)  


 


Thyroid Stimulating Hormone


(TSH) 6.754 uiU/mL


(0.358-3.740)


 


Free Thyroxine


  0.92 NG/DL


(0.76-1.46)


 


Free Triiodothyronine


  1.5 pg/mL


(2.3-4.2)  L








Height (Feet):  5


Height (Inches):  3.00


Weight (Pounds):  128


Medications





Current Medications








 Medications


  (Trade)  Dose


 Ordered  Sig/Didi


 Route


 PRN Reason  Start Time


 Stop Time Status Last Admin


Dose Admin


 


 Acetaminophen


  (Tylenol)  650 mg  Q4H  PRN


 ORAL


 FEVER  1/9/19 07:00


 2/8/19 06:59   


 


 


 Albuterol/


 Ipratropium


  (Albuterol/


 Ipratropium)  3 ml  Q4H  PRN


 HHN


 Shortness of Breath  1/9/19 07:00


 1/14/19 06:59   


 


 


 Aspirin


  (ASA)  162 mg  DAILY


 ORAL


   1/9/19 09:00


 2/8/19 08:59  1/10/19 08:22


 


 


 Dextrose


  (Dextrose 50%)  25 ml  Q30M  PRN


 IV


 Hypoglycemia  1/9/19 11:15


 2/8/19 11:14   


 


 


 Dextrose


  (Dextrose 50%)  50 ml  Q30M  PRN


 IV


 Hypoglycemia  1/9/19 11:15


 2/8/19 11:14   


 


 


 Divalproex Sodium


  (Depakote


 Sprinkles)  500 mg  Q12HR


 GT


   1/9/19 21:00


 2/8/19 20:59  1/10/19 20:50


 


 


 Heparin Sodium


  (Porcine)


  (Heparin 5000


 units/ml)  5,000 units  EVERY 12  HOURS


 SUBQ


   1/9/19 09:00


 2/8/19 08:59  1/10/19 20:50


 


 


 Insulin Aspart


  (NovoLOG)    Q6HR


 SUBQ


   1/9/19 12:00


 2/8/19 11:59  1/9/19 17:41


 


 


 Levetiracetam


  (Keppra)  750 mg  Q12HR


 GT


   1/9/19 21:00


 2/8/19 20:59  1/10/19 20:48


 


 


 Levothyroxine


 Sodium


  (Synthroid)  125 mcg  DAILY@0630


 ORAL


   1/11/19 06:30


 2/9/19 06:29   


 











Assessment/Plan


Problem List:  


(1) Trisomy 21, Down syndrome


ICD Codes:  Q90.9 - Down syndrome, unspecified


SNOMED:  09993135


(2) Seizure disorder


ICD Codes:  G40.909 - Epilepsy, unspecified, not intractable, without status 

epilepticus


SNOMED:  300911592


Assessment/Plan


con tdepakote


cont Maryanne Andujar MD [place] 10, 2019 23:11

## 2019-01-10 NOTE — PULMONOLOGY PROGRESS NOTE
Assessment/Plan


Problems:  


(1) Hypotension


(2) Peripheral arterial disease


(3) Seizure disorder


(4) Hypothyroidism


(5) Trisomy 21, Down syndrome


Assessment/Plan


bp better 


still bradycardic


increase the dose of synthroid


all noted


cardiology consult appreciated


echo reviewed


no seizures witnessed


sinus arnold with lowest rate of 44





Subjective


ROS Limited/Unobtainable:  No


Constitutional:  Reports: no symptoms


HEENT:  Repors: no symptoms


Respiratory:  Reports: no symptoms


Allergies:  


Coded Allergies:  


     No Known Allergies (Unverified , 1/8/19)





Objective





Last 24 Hour Vital Signs








  Date Time  Temp Pulse Resp B/P (MAP) Pulse Ox O2 Delivery O2 Flow Rate FiO2


 


1/10/19 09:00      Room Air  


 


1/10/19 08:50  60 18     21


 


1/10/19 08:00  53      


 


1/10/19 08:00 97.8 60 20 100/59 (73) 95   


 


1/10/19 04:00  64      


 


1/10/19 00:00  57      


 


1/9/19 21:00      Room Air  


 


1/9/19 20:00  84      


 


1/9/19 16:00  53      








General Appearance:  WD/WN


HEENT:  normocephalic, atraumatic


Respiratory/Chest:  chest wall non-tender, lungs clear


Cardiovascular:  normal peripheral pulses, normal rate


Abdomen:  normal bowel sounds, soft, non tender


Genitourinary:  normal external genitalia


Extremities:  no clubbing


Skin:  no lesions





Microbiology








 Date/Time


Source Procedure


Growth Status


 


 


 1/9/19 06:00


Nasal Nares MRSA Culture - Final


Staphylococcus Aureus - Mrsa Complete








Laboratory Tests


1/10/19 06:52: 


White Blood Count 6.4, Red Blood Count 3.90L, Hemoglobin 13.3, Hematocrit 39.8, 

Mean Corpuscular Volume 102H, Mean Corpuscular Hemoglobin 34.1H, Mean 

Corpuscular Hemoglobin Concent 33.5, Red Cell Distribution Width 13.6, Platelet 

Count 353, Mean Platelet Volume 6.8, Neutrophils (%) (Auto) 32.6L, Lymphocytes (

%) (Auto) 51.5H, Monocytes (%) (Auto) 12.9H, Eosinophils (%) (Auto) 1.6, 

Basophils (%) (Auto) 1.5, Prothrombin Time 10.8, Prothromb Time International 

Ratio 1.0, Activated Partial Thromboplast Time 30, Troponin I 0.000, C-Reactive 

Protein, Quantitative 2.6H, Triglycerides Level 94, Cholesterol Level 142, LDL 

Cholesterol 91, HDL Cholesterol 32L, Cholesterol/HDL Ratio 4.4, Thyroid 

Stimulating Hormone (TSH) 6.754H





Current Medications








 Medications


  (Trade)  Dose


 Ordered  Sig/Didi


 Route


 PRN Reason  Start Time


 Stop Time Status Last Admin


Dose Admin


 


 Acetaminophen


  (Tylenol)  650 mg  Q4H  PRN


 ORAL


 FEVER  1/9/19 07:00


 2/8/19 06:59   


 


 


 Albuterol/


 Ipratropium


  (Albuterol/


 Ipratropium)  3 ml  Q4H  PRN


 HHN


 Shortness of Breath  1/9/19 07:00


 1/14/19 06:59   


 


 


 Aspirin


  (ASA)  162 mg  DAILY


 ORAL


   1/9/19 09:00


 2/8/19 08:59  1/10/19 08:22


 


 


 Dextrose


  (Dextrose 50%)  25 ml  Q30M  PRN


 IV


 Hypoglycemia  1/9/19 11:15


 2/8/19 11:14   


 


 


 Dextrose


  (Dextrose 50%)  50 ml  Q30M  PRN


 IV


 Hypoglycemia  1/9/19 11:15


 2/8/19 11:14   


 


 


 Diltiazem HCl


  (Cardizem)  10 mg  Q1H  PRN


 IV


 heart rate more than 120,   1/9/19 07:00


 2/8/19 06:59   


 


 


 Divalproex Sodium


  (Depakote


 Sprinkles)  500 mg  Q12HR


 GT


   1/9/19 21:00


 2/8/19 20:59  1/10/19 08:22


 


 


 Enalaprilat


  (Vasotec)  2.5 mg  Q6H  PRN


 IV


 sbp more than 160  1/9/19 07:00


 2/8/19 06:59   


 


 


 Heparin Sodium


  (Porcine)


  (Heparin 5000


 units/ml)  5,000 units  EVERY 12  HOURS


 SUBQ


   1/9/19 09:00


 2/8/19 08:59  1/10/19 08:20


 


 


 Insulin Aspart


  (NovoLOG)    Q6HR


 SUBQ


   1/9/19 12:00


 2/8/19 11:59  1/9/19 17:41


 


 


 Ketorolac


 Tromethamine


  (Toradol 30mg)  30 mg  Q6H  PRN


 IV


 moderate pain ( 4-6)  1/9/19 07:00


 1/14/19 06:59   


 


 


 Levetiracetam


  (Keppra)  750 mg  Q12HR


 GT


   1/9/19 21:00


 2/8/19 20:59  1/10/19 08:21


 


 


 Levothyroxine


 Sodium


  (Synthroid)  75 mcg  DAILY@0630


 ORAL


   1/10/19 06:30


 2/9/19 06:29  1/10/19 06:30


 


 


 Morphine Sulfate


  (Morphine


 Sulfate)  2 mg  Q4H  PRN


 IVP


 severe Pain (Pain Scale 7-10)  1/9/19 07:00


 1/16/19 06:59   


 


 


 Nitroglycerin


  (Ntg)  0.4 mg  Q5M  PRN


 SL


 Prn Chest Pain  1/9/19 07:00


 2/8/19 06:59   


 


 


 Ondansetron HCl


  (Zofran)  4 mg  Q6H  PRN


 IVP


 Nausea & Vomiting  1/9/19 07:00


 2/8/19 06:59   


 


 


 Polyethylene


 Glycol


  (Miralax)  17 gm  DAILYPRN  PRN


 ORAL


 Constipation  1/9/19 07:00


 2/8/19 06:59   


 


 


 Temazepam


  (Restoril)  15 mg  HSPRN  PRN


 ORAL


 Insomnia  1/9/19 07:00


 1/16/19 06:59   


 

















Chano Cherry MD Giovanni 10, 2019 12:06

## 2019-01-11 VITALS — SYSTOLIC BLOOD PRESSURE: 107 MMHG | DIASTOLIC BLOOD PRESSURE: 61 MMHG

## 2019-01-11 VITALS — SYSTOLIC BLOOD PRESSURE: 105 MMHG | DIASTOLIC BLOOD PRESSURE: 66 MMHG

## 2019-01-11 VITALS — DIASTOLIC BLOOD PRESSURE: 69 MMHG | SYSTOLIC BLOOD PRESSURE: 121 MMHG

## 2019-01-11 VITALS — DIASTOLIC BLOOD PRESSURE: 93 MMHG | SYSTOLIC BLOOD PRESSURE: 98 MMHG

## 2019-01-11 VITALS — SYSTOLIC BLOOD PRESSURE: 126 MMHG | DIASTOLIC BLOOD PRESSURE: 77 MMHG

## 2019-01-11 RX ADMIN — INSULIN ASPART SCH UNITS: 100 INJECTION, SOLUTION INTRAVENOUS; SUBCUTANEOUS at 23:16

## 2019-01-11 RX ADMIN — ASPIRIN 81 MG SCH MG: 81 TABLET ORAL at 08:40

## 2019-01-11 RX ADMIN — INSULIN ASPART SCH UNITS: 100 INJECTION, SOLUTION INTRAVENOUS; SUBCUTANEOUS at 00:00

## 2019-01-11 RX ADMIN — HEPARIN SODIUM SCH UNITS: 5000 INJECTION INTRAVENOUS; SUBCUTANEOUS at 21:28

## 2019-01-11 RX ADMIN — LEVETIRACETAM SCH MG: 100 SOLUTION ORAL at 08:38

## 2019-01-11 RX ADMIN — INSULIN ASPART SCH UNITS: 100 INJECTION, SOLUTION INTRAVENOUS; SUBCUTANEOUS at 17:22

## 2019-01-11 RX ADMIN — DIVALPROEX SODIUM SCH MG: 125 CAPSULE ORAL at 08:46

## 2019-01-11 RX ADMIN — DIVALPROEX SODIUM SCH MG: 125 CAPSULE ORAL at 21:25

## 2019-01-11 RX ADMIN — LEVETIRACETAM SCH MG: 100 SOLUTION ORAL at 21:27

## 2019-01-11 RX ADMIN — INSULIN ASPART SCH UNITS: 100 INJECTION, SOLUTION INTRAVENOUS; SUBCUTANEOUS at 05:48

## 2019-01-11 RX ADMIN — HEPARIN SODIUM SCH UNITS: 5000 INJECTION INTRAVENOUS; SUBCUTANEOUS at 08:39

## 2019-01-11 RX ADMIN — INSULIN ASPART SCH UNITS: 100 INJECTION, SOLUTION INTRAVENOUS; SUBCUTANEOUS at 13:57

## 2019-01-11 NOTE — GENERAL PROGRESS NOTE
Assessment/Plan


Problem List:  


(1) Trisomy 21, Down syndrome


ICD Codes:  Q90.9 - Down syndrome, unspecified


SNOMED:  95648271


(2) Seizure disorder


ICD Codes:  G40.909 - Epilepsy, unspecified, not intractable, without status 

epilepticus


SNOMED:  873526530


Assessment/Plan


con tdepakote


cont ativan





Subjective


Neurologic/Psychiatric:  Reports: anxiety, depressed, emotional problems


Allergies:  


Coded Allergies:  


     No Known Allergies (Unverified , 1/8/19)





Objective





Last 24 Hour Vital Signs








  Date Time  Temp Pulse Resp B/P (MAP) Pulse Ox O2 Delivery O2 Flow Rate FiO2


 


1/11/19 11:07  72 18   Room Air  21


 


1/11/19 09:00      Room Air  


 


1/11/19 08:00  86      


 


1/11/19 08:00 97.7 91 16 98/93 (95) 94   


 


1/11/19 04:00 97.1 71 18 105/66 (79) 94   


 


1/11/19 04:00  70      


 


1/11/19 00:00  62      


 


1/11/19 00:00 97.6 65 18 107/61 (76) 93   


 


1/10/19 21:21  69 18   Room Air  21


 


1/10/19 21:00      Room Air  


 


1/10/19 20:00 97.7 74 18 104/68 (80) 92   


 


1/10/19 20:00  74      


 


1/10/19 16:00 97.0 85 19 114/63 (80) 96   


 


1/10/19 16:00  59      

















Intake and Output  


 


 1/10/19 1/11/19





 19:00 07:00


 


Intake Total  780 ml


 


Balance  780 ml


 


  


 


Intake Free Water  330 ml


 


Tube Feeding  450 ml


 


# Voids 2 2








Laboratory Tests


1/11/19 07:00: Troponin I 0.005


Height (Feet):  5


Height (Inches):  3.00


Weight (Pounds):  128


General Appearance:  alert, confused


Neurologic:  depressed affect











Maryanne Damon MD Jan 11, 2019 15:38

## 2019-01-11 NOTE — CARDIOLOGY PROGRESS NOTE
Assessment/Plan


Assessment/Plan


1. Right bundle-branch conduction defect.


2. Sinus bradycardia.


3. Severely contracted lower extremities.


4. Down syndrome.


5. History of sacral decubitus ulcers.








noted synthorid dose increase 


tele sinus rate better 


no new labs





Subjective


ROS Limited/Unobtainable:  Yes





Objective





Last 24 Hour Vital Signs








  Date Time  Temp Pulse Resp B/P (MAP) Pulse Ox O2 Delivery O2 Flow Rate FiO2


 


1/11/19 16:00 98.0 79 16 121/69 (86) 92   


 


1/11/19 12:00 98.0 79 16 126/77 (93) 93   


 


1/11/19 11:50  78      


 


1/11/19 11:07  72 18   Room Air  21


 


1/11/19 09:00      Room Air  


 


1/11/19 08:00  86      


 


1/11/19 08:00 97.7 91 16 98/93 (95) 94   


 


1/11/19 04:00 97.1 71 18 105/66 (79) 94   


 


1/11/19 04:00  70      


 


1/11/19 00:00  62      


 


1/11/19 00:00 97.6 65 18 107/61 (76) 93   


 


1/10/19 21:21  69 18   Room Air  21


 


1/10/19 21:00      Room Air  


 


1/10/19 20:00 97.7 74 18 104/68 (80) 92   


 


1/10/19 20:00  74      








General Appearance:  no apparent distress, other - sleeping











Intake and Output  


 


 1/10/19 1/11/19





 18:59 06:59


 


Intake Total  780 ml


 


Balance  780 ml


 


  


 


Intake Free Water  330 ml


 


Tube Feeding  450 ml


 


# Voids 4 2











Laboratory Tests








Test


  1/11/19


07:00


 


Troponin I


  0.005 ng/mL


(0.000-0.056)











Microbiology








 Date/Time


Source Procedure


Growth Status


 


 


 1/9/19 06:00


Nasal Nares MRSA Culture - Final


Staphylococcus Aureus - Mrsa Complete





 1/9/19 06:00


Rectum VRE Culture - Final


Enterococcus Faecium - Vre Complete


 


 1/9/19 06:00


Rectum - Final


NO CARBAPENEM-RESISTANT ENTEROBACTERI... Complete

















Constantine Jorgensen MD Jan 11, 2019 19:33

## 2019-01-11 NOTE — PULMONOLOGY PROGRESS NOTE
Assessment/Plan


Problems:  


(1) Hypotension


(2) Peripheral arterial disease


(3) Seizure disorder


(4) Hypothyroidism


(5) Trisomy 21, Down syndrome


Assessment/Plan


bp better 


heart rate better


increase the dose of synthroid


all noted


cardiology consult appreciated


echo reviewed


no seizures witnessed





check TSH in a few weeks





Subjective


ROS Limited/Unobtainable:  No


Constitutional:  Reports: no symptoms


HEENT:  Repors: no symptoms


Allergies:  


Coded Allergies:  


     No Known Allergies (Unverified , 1/8/19)





Objective





Last 24 Hour Vital Signs








  Date Time  Temp Pulse Resp B/P (MAP) Pulse Ox O2 Delivery O2 Flow Rate FiO2


 


1/11/19 11:07  72 18   Room Air  21


 


1/11/19 09:00      Room Air  


 


1/11/19 08:00  86      


 


1/11/19 08:00 97.7 91 16 98/93 (95) 94   


 


1/11/19 04:00 97.1 71 18 105/66 (79) 94   


 


1/11/19 04:00  70      


 


1/11/19 00:00  62      


 


1/11/19 00:00 97.6 65 18 107/61 (76) 93   


 


1/10/19 21:21  69 18   Room Air  21


 


1/10/19 21:00      Room Air  


 


1/10/19 20:00 97.7 74 18 104/68 (80) 92   


 


1/10/19 20:00  74      


 


1/10/19 16:00 97.0 85 19 114/63 (80) 96   


 


1/10/19 16:00  59      

















Intake and Output  


 


 1/10/19 1/11/19





 19:00 07:00


 


Intake Total  780 ml


 


Balance  780 ml


 


  


 


Intake Free Water  330 ml


 


Tube Feeding  450 ml


 


# Voids 2 2








General Appearance:  WD/WN


HEENT:  normocephalic


Respiratory/Chest:  chest wall non-tender, lungs clear


Breasts:  no masses


Cardiovascular:  normal rate


Abdomen:  normal bowel sounds, soft, non tender


Extremities:  no cyanosis


Neurologic/Psychiatric:  CNs II-XII grossly normal





Microbiology








 Date/Time


Source Procedure


Growth Status


 


 


 1/9/19 06:00


Nasal Nares MRSA Culture - Final


Staphylococcus Aureus - Mrsa Complete





 1/9/19 06:00


Rectum VRE Culture - Final


Enterococcus Faecium - Vre Complete


 


 1/9/19 06:00


Rectum - Final


NO CARBAPENEM-RESISTANT ENTEROBACTERI... Complete








Laboratory Tests


1/11/19 07:00: Troponin I 0.005





Current Medications








 Medications


  (Trade)  Dose


 Ordered  Sig/Didi


 Route


 PRN Reason  Start Time


 Stop Time Status Last Admin


Dose Admin


 


 Acetaminophen


  (Tylenol)  650 mg  Q4H  PRN


 ORAL


 FEVER  1/9/19 07:00


 2/8/19 06:59   


 


 


 Albuterol/


 Ipratropium


  (Albuterol/


 Ipratropium)  3 ml  Q4H  PRN


 HHN


 Shortness of Breath  1/9/19 07:00


 1/14/19 06:59   


 


 


 Aspirin


  (ASA)  162 mg  DAILY


 ORAL


   1/9/19 09:00


 2/8/19 08:59  1/11/19 08:40


 


 


 Dextrose


  (Dextrose 50%)  25 ml  Q30M  PRN


 IV


 Hypoglycemia  1/9/19 11:15


 2/8/19 11:14   


 


 


 Dextrose


  (Dextrose 50%)  50 ml  Q30M  PRN


 IV


 Hypoglycemia  1/9/19 11:15


 2/8/19 11:14   


 


 


 Divalproex Sodium


  (Depakote


 Sprinkles)  500 mg  Q12HR


 GT


   1/9/19 21:00


 2/8/19 20:59  1/11/19 08:46


 


 


 Heparin Sodium


  (Porcine)


  (Heparin 5000


 units/ml)  5,000 units  EVERY 12  HOURS


 SUBQ


   1/9/19 09:00


 2/8/19 08:59  1/11/19 08:39


 


 


 Insulin Aspart


  (NovoLOG)    Q6HR


 SUBQ


   1/9/19 12:00


 2/8/19 11:59  1/11/19 05:48


 


 


 Levetiracetam


  (Keppra)  750 mg  Q12HR


 GT


   1/9/19 21:00


 2/8/19 20:59  1/11/19 08:38


 


 


 Levothyroxine


 Sodium


  (Synthroid)  125 mcg  DAILY@0630


 ORAL


   1/11/19 06:30


 2/9/19 06:29  1/11/19 06:00


 


 


 Lorazepam


  (Ativan)  1 mg  Q6H  PRN


 ORAL


 For Anxiety  1/10/19 23:15


 1/17/19 23:14   


 

















Chano Cherry MD Jan 11, 2019 12:31

## 2019-01-12 VITALS — DIASTOLIC BLOOD PRESSURE: 58 MMHG | SYSTOLIC BLOOD PRESSURE: 100 MMHG

## 2019-01-12 VITALS — SYSTOLIC BLOOD PRESSURE: 95 MMHG | DIASTOLIC BLOOD PRESSURE: 44 MMHG

## 2019-01-12 VITALS — SYSTOLIC BLOOD PRESSURE: 136 MMHG | DIASTOLIC BLOOD PRESSURE: 76 MMHG

## 2019-01-12 VITALS — SYSTOLIC BLOOD PRESSURE: 106 MMHG | DIASTOLIC BLOOD PRESSURE: 68 MMHG

## 2019-01-12 VITALS — SYSTOLIC BLOOD PRESSURE: 114 MMHG | DIASTOLIC BLOOD PRESSURE: 82 MMHG

## 2019-01-12 VITALS — DIASTOLIC BLOOD PRESSURE: 67 MMHG | SYSTOLIC BLOOD PRESSURE: 109 MMHG

## 2019-01-12 RX ADMIN — INSULIN ASPART SCH UNITS: 100 INJECTION, SOLUTION INTRAVENOUS; SUBCUTANEOUS at 11:33

## 2019-01-12 RX ADMIN — INSULIN ASPART SCH UNITS: 100 INJECTION, SOLUTION INTRAVENOUS; SUBCUTANEOUS at 06:00

## 2019-01-12 NOTE — GENERAL PROGRESS NOTE
Assessment/Plan


Problem List:  


(1) Trisomy 21, Down syndrome


ICD Codes:  Q90.9 - Down syndrome, unspecified


SNOMED:  68433370


(2) Seizure disorder


ICD Codes:  G40.909 - Epilepsy, unspecified, not intractable, without status 

epilepticus


SNOMED:  085574436


Assessment/Plan


con tdepakote


cont ativan





Subjective


Date patient seen:  Jan 12, 2019


Neurologic/Psychiatric:  Reports: anxiety, depressed, emotional problems


Allergies:  


Coded Allergies:  


     No Known Allergies (Unverified , 1/8/19)





Objective





Last 24 Hour Vital Signs








  Date Time  Temp Pulse Resp B/P (MAP) Pulse Ox O2 Delivery O2 Flow Rate FiO2


 


1/12/19 13:35 97.2 77 20 100/58 (72) 93   


 


1/12/19 12:00 97.6 51 20 95/44 (61) 97   


 


1/12/19 09:00      Room Air  


 


1/12/19 08:00 97.5 76 20 114/82 (93) 98   


 


1/12/19 05:00      Nasal Cannula 2.0 


 


1/12/19 04:00 98.9 70 20 106/68 (81) 96   


 


1/12/19 00:31 99.4 85 20 109/67 (81) 91   


 


1/12/19 00:00 98.5 72 20 136/76 (96) 94   

















Intake and Output  


 


 1/11/19 1/12/19





 18:59 06:59


 


Intake Total  515 ml


 


Balance  515 ml


 


  


 


Intake Free Water  200 ml


 


Tube Feeding  315 ml


 


# Voids 1 1








Height (Feet):  5


Height (Inches):  3.00


Weight (Pounds):  128


General Appearance:  alert, confused











Maryanne Damon MD Jan 12, 2019 23:40

## 2019-01-14 NOTE — DISCHARGE SUMMARY
Discharge Summary


Discharge Summary


_


DATE OF ADMISSION: 1/9/2019





DATE OF DISCHARGE: 1/12/2019








DISCHARGED BY: Dr. Cherry





REASON FOR ADMISSION: 


58 years old fe male with history of trisomy 21, dysphagia , feeding by G-tube, 

seizure disorder, hypothyroidism, bedbound, nursing home resident, was brought 

by paramedics from [facility] for cardiac    evaluation.  


Patient with history of peripheral arterial disease.  


No reported chest pain or shortness of breath.  


No fever , no chills.  


Patient by herself was unable to provide any history.


Upon evaluation   patient was hypotensive with  blood pressure 91/56.  Heart 

rate was 56.


Distal pulse in both extremities  were present.


Troponin negative , EKG revealed sinus rhythm,  no acute ischemic changes.  


Chest x-ray revealed no acute cardiopulmonary pathology.  


Laboratory workup revealed no leukocytosis, stable hemoglobin hematocrit.


Patient initially was hypotensive but    responded to IV hydration.  


Patient admitted for further management.





CONSULTANTS:


cardiologist Dr. Jorgensen


psychiatrist 


 


\[facility]\"" COURSE: 


Patient initially admitted to telemetry floor and  started on gentle IV 

hydration.  


Cardiology consult was requested.


Venous duplex of left lower extremity was of  limited  value due to  contracted 

lower extremity ; no evidence of common femoral or downstream femoral venous 

thrombosis was demonstrated.


Arterial duplex of   left lower extremity was limited due to severe contraction 

, but showed no evidence of lower extremity arterial insufficiency to the level 

of the mid superficial femoral artery.  Distal disease was not excludable.


Antiplatelet therapy with aspirin continued.


Echocardiogram revealed preserved ejection fraction 55-60%.  No evidence of 

left ventricular hypertrophy.  


No evidence of wall motion abnormality.  


Right ventricular systolic pressure of 10.








TSH elevated  with low T3 .  Dose of Synthroid was increased.


Seizure precautions were maintained.  No evidence of seizure activity while in 

the hospital.


Keppra and Depakote were continued.  


Blood sugar was managed with sliding scale of insulin.  


DVT prophylaxis provided. 


Strict aspiration precautions were maintained.  


Patient was able to tolerate tube feeding.


Supportive care provided.  


Bowel regimen instituted





Bradycardia  was mild and resolved with IV hydration.  Blood pressure 

stabilized with IV hydration.  


Patient  stabilized and was ready for transfer back to facility for 

continuation of care. 








FINAL DIAGNOSES: 


Hypotension


Peripheral arterial disease


Right bundle branch conduction defect  


Sinus bradycardia -resolved 


Hypothyroidism  


Seizure disorder


Trisomy 21/Down syndrome








DISCHARGE MEDICATION


See Medication Reconciliation list.





DISCHARGE INSTRUCTIONS:


Patient was discharged to the skilled nursing facility. 


Follow up with medical doctor at the facility.











I have been assigned to dictate discharge summary for this account. 


I was not involved in the patient's management.











Joyce Welch NP Jan 14, 2019 08:43

## 2019-02-01 ENCOUNTER — HOSPITAL ENCOUNTER (INPATIENT)
Dept: HOSPITAL 72 - EMR | Age: 59
LOS: 5 days | Discharge: NURSING, REHAB OR LONG-TERM CARE | DRG: 194 | End: 2019-02-06
Payer: MEDICARE

## 2019-02-01 VITALS — SYSTOLIC BLOOD PRESSURE: 110 MMHG | DIASTOLIC BLOOD PRESSURE: 87 MMHG

## 2019-02-01 VITALS — WEIGHT: 129.31 LBS | HEIGHT: 64 IN | BODY MASS INDEX: 22.07 KG/M2

## 2019-02-01 VITALS — DIASTOLIC BLOOD PRESSURE: 85 MMHG | SYSTOLIC BLOOD PRESSURE: 111 MMHG

## 2019-02-01 DIAGNOSIS — R13.10: ICD-10-CM

## 2019-02-01 DIAGNOSIS — G40.909: ICD-10-CM

## 2019-02-01 DIAGNOSIS — E03.9: ICD-10-CM

## 2019-02-01 DIAGNOSIS — E46: ICD-10-CM

## 2019-02-01 DIAGNOSIS — F41.9: ICD-10-CM

## 2019-02-01 DIAGNOSIS — F32.89: ICD-10-CM

## 2019-02-01 DIAGNOSIS — Z93.1: ICD-10-CM

## 2019-02-01 DIAGNOSIS — D72.829: ICD-10-CM

## 2019-02-01 DIAGNOSIS — G93.49: ICD-10-CM

## 2019-02-01 DIAGNOSIS — Q90.9: ICD-10-CM

## 2019-02-01 DIAGNOSIS — J20.9: ICD-10-CM

## 2019-02-01 DIAGNOSIS — J18.9: Primary | ICD-10-CM

## 2019-02-01 LAB
ADD MANUAL DIFF: NO
ALBUMIN SERPL-MCNC: 2.2 G/DL (ref 3.4–5)
ALBUMIN/GLOB SERPL: 0.5 {RATIO} (ref 1–2.7)
ALP SERPL-CCNC: 60 U/L (ref 46–116)
ALT SERPL-CCNC: 20 U/L (ref 12–78)
ANION GAP SERPL CALC-SCNC: 2 MMOL/L (ref 5–15)
APPEARANCE UR: (no result)
APTT PPP: (no result) S
AST SERPL-CCNC: 19 U/L (ref 15–37)
BASOPHILS NFR BLD AUTO: 0.9 % (ref 0–2)
BILIRUB SERPL-MCNC: 0.2 MG/DL (ref 0.2–1)
BUN SERPL-MCNC: 11 MG/DL (ref 7–18)
CALCIUM SERPL-MCNC: 8.7 MG/DL (ref 8.5–10.1)
CHLORIDE SERPL-SCNC: 105 MMOL/L (ref 98–107)
CK SERPL-CCNC: 45 U/L (ref 26–308)
CO2 SERPL-SCNC: 32 MMOL/L (ref 21–32)
CREAT SERPL-MCNC: 0.5 MG/DL (ref 0.55–1.3)
EOSINOPHIL NFR BLD AUTO: 0.8 % (ref 0–3)
ERYTHROCYTE [DISTWIDTH] IN BLOOD BY AUTOMATED COUNT: 13.6 % (ref 11.6–14.8)
GLOBULIN SER-MCNC: 4.8 G/DL
GLUCOSE UR STRIP-MCNC: NEGATIVE MG/DL
HCT VFR BLD CALC: 45.1 % (ref 37–47)
HGB BLD-MCNC: 15.1 G/DL (ref 12–16)
KETONES UR QL STRIP: NEGATIVE
LEUKOCYTE ESTERASE UR QL STRIP: (no result)
LYMPHOCYTES NFR BLD AUTO: 28.3 % (ref 20–45)
MCV RBC AUTO: 103 FL (ref 80–99)
MONOCYTES NFR BLD AUTO: 9.2 % (ref 1–10)
NEUTROPHILS NFR BLD AUTO: 60.8 % (ref 45–75)
NITRITE UR QL STRIP: NEGATIVE
PH UR STRIP: 7 [PH] (ref 4.5–8)
PLATELET # BLD: 219 K/UL (ref 150–450)
POTASSIUM SERPL-SCNC: 4.2 MMOL/L (ref 3.5–5.1)
PROT UR QL STRIP: NEGATIVE
RBC # BLD AUTO: 4.39 M/UL (ref 4.2–5.4)
SODIUM SERPL-SCNC: 139 MMOL/L (ref 136–145)
SP GR UR STRIP: 1 (ref 1–1.03)
UROBILINOGEN UR-MCNC: NORMAL MG/DL (ref 0–1)
WBC # BLD AUTO: 11.6 K/UL (ref 4.8–10.8)

## 2019-02-01 PROCEDURE — 94640 AIRWAY INHALATION TREATMENT: CPT

## 2019-02-01 PROCEDURE — 94760 N-INVAS EAR/PLS OXIMETRY 1: CPT

## 2019-02-01 PROCEDURE — 80202 ASSAY OF VANCOMYCIN: CPT

## 2019-02-01 PROCEDURE — 84443 ASSAY THYROID STIM HORMONE: CPT

## 2019-02-01 PROCEDURE — 82550 ASSAY OF CK (CPK): CPT

## 2019-02-01 PROCEDURE — 80069 RENAL FUNCTION PANEL: CPT

## 2019-02-01 PROCEDURE — 87205 SMEAR GRAM STAIN: CPT

## 2019-02-01 PROCEDURE — 96365 THER/PROPH/DIAG IV INF INIT: CPT

## 2019-02-01 PROCEDURE — 84100 ASSAY OF PHOSPHORUS: CPT

## 2019-02-01 PROCEDURE — 36415 COLL VENOUS BLD VENIPUNCTURE: CPT

## 2019-02-01 PROCEDURE — 87181 SC STD AGAR DILUTION PER AGT: CPT

## 2019-02-01 PROCEDURE — 84484 ASSAY OF TROPONIN QUANT: CPT

## 2019-02-01 PROCEDURE — 94664 DEMO&/EVAL PT USE INHALER: CPT

## 2019-02-01 PROCEDURE — 93005 ELECTROCARDIOGRAM TRACING: CPT

## 2019-02-01 PROCEDURE — 82962 GLUCOSE BLOOD TEST: CPT

## 2019-02-01 PROCEDURE — 83735 ASSAY OF MAGNESIUM: CPT

## 2019-02-01 PROCEDURE — 80053 COMPREHEN METABOLIC PANEL: CPT

## 2019-02-01 PROCEDURE — 71045 X-RAY EXAM CHEST 1 VIEW: CPT

## 2019-02-01 PROCEDURE — 97802 MEDICAL NUTRITION INDIV IN: CPT

## 2019-02-01 PROCEDURE — 80299 QUANTITATIVE ASSAY DRUG: CPT

## 2019-02-01 PROCEDURE — 87070 CULTURE OTHR SPECIMN AEROBIC: CPT

## 2019-02-01 PROCEDURE — 96375 TX/PRO/DX INJ NEW DRUG ADDON: CPT

## 2019-02-01 PROCEDURE — 83605 ASSAY OF LACTIC ACID: CPT

## 2019-02-01 PROCEDURE — 87081 CULTURE SCREEN ONLY: CPT

## 2019-02-01 PROCEDURE — 80048 BASIC METABOLIC PNL TOTAL CA: CPT

## 2019-02-01 PROCEDURE — 85025 COMPLETE CBC W/AUTO DIFF WBC: CPT

## 2019-02-01 PROCEDURE — 81003 URINALYSIS AUTO W/O SCOPE: CPT

## 2019-02-01 PROCEDURE — 99285 EMERGENCY DEPT VISIT HI MDM: CPT

## 2019-02-01 PROCEDURE — 87040 BLOOD CULTURE FOR BACTERIA: CPT

## 2019-02-01 RX ADMIN — HEPARIN SODIUM SCH UNITS: 5000 INJECTION INTRAVENOUS; SUBCUTANEOUS at 23:20

## 2019-02-01 NOTE — EMERGENCY ROOM REPORT
History of Present Illness


General


Chief Complaint:  Upper Respiratory Illness


Source:  Medical Record





Present Illness


HPI


58-year-old female presents ED for evaluation.  Referred to ED for reported 

cough and congestion for several days.  Coming from skilled nursing facility.  

Chest x-ray done at skilled nursing facility which showed pneumonia.  Patient 

afebrile.  No signs of distress.  No reported chest pain or shortness of 

breath.  Patient is nonverbal at baseline.  No other aggravating relieving 

factors.  No other associated symptoms


Allergies:  


Coded Allergies:  


     No Known Allergies (Unverified , 1/8/19)





Patient History


Past Medical History:  seizures, other - MR


Past Surgical History:  none


Pertinent Family History:  none


Social History:  Denies: smoking, alcohol use, drug use


Pregnant Now:  No


Immunizations:  UTD


Reviewed Nursing Documentation:  PMH: Agreed; PSxH: Agreed





Nursing Documentation-PMH


Past Medical History:  No History, Except For


Hx Diabetes:  No - Hypothyroid


Hx Seizures:  Yes





Review of Systems


All Other Systems:  limited





Physical Exam





Vital Signs








  Date Time  Temp Pulse Resp B/P (MAP) Pulse Ox O2 Delivery O2 Flow Rate FiO2


 


2/1/19 18:48 98.6 79 16 90/55 94 Nasal Cannula 2.0 


 


2/1/19 19:00        99








Sp02 EP Interpretation:  reviewed, normal


General Appearance:  no apparent distress, alert, non-toxic, other - nonverbal


Head:  normocephalic, atraumatic


Eyes:  bilateral eye normal inspection, bilateral eye PERRL


ENT:  hearing grossly normal, normal pharynx, no angioedema, normal voice


Neck:  full range of motion, supple/symm/no masses


Respiratory:  chest non-tender, crackles, speaking full sentences, wheezing


Cardiovascular #1:  regular rate, rhythm, no edema


Cardiovascular #2:  2+ carotid (R), 2+ carotid (L), 2+ radial (R), 2+ radial (L)

, 2+ dorsalis pedis (R), 2+ dorsalis pedis (L)


Gastrointestinal:  normal bowel sounds, non tender, soft, non-distended, no 

guarding, no rebound


Rectal:  deferred


Genitourinary:  normal inspection, no CVA tenderness


Musculoskeletal:  back normal, gait/station normal, normal range of motion, non-

tender


Neurologic:  responsive, motor strength/tone normal, sensory intact, speech 

normal, other - nonverbal


Psychiatric:  other - nonverbal


Reflexes:  3+ bicep (R), 3+ bicep (L), 3+ tricep (R), 3+ tricep (L), 3+ knee (R)

, 3+ knee (L)


Skin:  normal color, no rash, warm/dry, well hydrated


Lymphatic:  no adenopathy





Medical Decision Making


Diagnostic Impression:  


 Primary Impression:  


 Pneumonia


 Qualified Codes:  J18.9 - Pneumonia, unspecified organism


 Additional Impression:  


 Trisomy 21, Down syndrome


ER Course


Hospital Course 


58-year-old female presents to ED complaining of cough, congestion 





Differential diagnoses include: URI, bronchitis, asthma/COPD, pneumonia 





Clinical course


Patient placed on stretcher.  After initial history, physical exam reveals a 

female in no acute distress.  Bilateral TM unremarkable.  No pharyngeal 

erythema.  No tonsillar exudates.  No lymphadenopathy.  lungs wheezing





I ordered labs, IV fluids, nebs, solumedrol, chest x-ray.





Labs reviewed-leukocytosis noted, hemoglobin/hematocrit stable, electrolytes 

okay, lactic ok 


EKG - NSR, no acute ischemic changes interpreted by me 


Chest x-ray shows bilateral intersitial congestion 





Antibiotics given.  Patient will be admitted to Dr. Cherry





Diagnosis - pneumonia, trisomy 





admitted to floor in serious condition





Labs








Test


  2/1/19


19:15 2/1/19


20:00


 


White Blood Count


  11.6 K/UL


(4.8-10.8) 


 


 


Red Blood Count


  4.39 M/UL


(4.20-5.40) 


 


 


Hemoglobin


  15.1 G/DL


(12.0-16.0) 


 


 


Hematocrit


  45.1 %


(37.0-47.0) 


 


 


Mean Corpuscular Volume 103 FL (80-99)  


 


Mean Corpuscular Hemoglobin


  34.3 PG


(27.0-31.0) 


 


 


Mean Corpuscular Hemoglobin


Concent 33.4 G/DL


(32.0-36.0) 


 


 


Red Cell Distribution Width


  13.6 %


(11.6-14.8) 


 


 


Platelet Count


  219 K/UL


(150-450) 


 


 


Mean Platelet Volume


  7.4 FL


(6.5-10.1) 


 


 


Neutrophils (%) (Auto)


  60.8 %


(45.0-75.0) 


 


 


Lymphocytes (%) (Auto)


  28.3 %


(20.0-45.0) 


 


 


Monocytes (%) (Auto)


  9.2 %


(1.0-10.0) 


 


 


Eosinophils (%) (Auto)


  0.8 %


(0.0-3.0) 


 


 


Basophils (%) (Auto)


  0.9 %


(0.0-2.0) 


 


 


Sodium Level


  139 MMOL/L


(136-145) 


 


 


Potassium Level


  4.2 MMOL/L


(3.5-5.1) 


 


 


Chloride Level


  105 MMOL/L


() 


 


 


Carbon Dioxide Level


  32 MMOL/L


(21-32) 


 


 


Anion Gap


  2 mmol/L


(5-15) 


 


 


Blood Urea Nitrogen


  11 mg/dL


(7-18) 


 


 


Creatinine


  0.5 MG/DL


(0.55-1.30) 


 


 


Estimat Glomerular Filtration


Rate > 60 mL/min


(>60) 


 


 


Glucose Level


  102 MG/DL


() 


 


 


Lactic Acid Level


  1.10 mmol/L


(0.4-2.0) 


 


 


Calcium Level


  8.7 MG/DL


(8.5-10.1) 


 


 


Total Bilirubin


  0.2 MG/DL


(0.2-1.0) 


 


 


Aspartate Amino Transf


(AST/SGOT) 19 U/L (15-37) 


  


 


 


Alanine Aminotransferase


(ALT/SGPT) 20 U/L (12-78) 


  


 


 


Alkaline Phosphatase


  60 U/L


() 


 


 


Total Creatine Kinase


  45 U/L


() 


 


 


Troponin I


  0.007 ng/mL


(0.000-0.056) 


 


 


Total Protein


  7.0 G/DL


(6.4-8.2) 


 


 


Albumin


  2.2 G/DL


(3.4-5.0) 


 


 


Globulin 4.8 g/dL  


 


Albumin/Globulin Ratio 0.5 (1.0-2.7)  


 


Urine Color  Pale yellow 


 


Urine Appearance


  


  Slightly


cloudy


 


Urine pH  7 (4.5-8.0) 


 


Urine Specific Gravity


  


  1.005


(1.005-1.035)


 


Urine Protein


  


  Negative


(NEGATIVE)


 


Urine Glucose (UA)


  


  Negative


(NEGATIVE)


 


Urine Ketones


  


  Negative


(NEGATIVE)


 


Urine Blood


  


  Negative


(NEGATIVE)


 


Urine Nitrite


  


  Negative


(NEGATIVE)


 


Urine Bilirubin


  


  Negative


(NEGATIVE)


 


Urine Urobilinogen


  


  Normal MG/DL


(0.0-1.0)


 


Urine Leukocyte Esterase  1+ (NEGATIVE) 


 


Urine RBC


  


  0-2 /HPF (0 -


2)


 


Urine WBC


  


  2-4 /HPF (0 -


2)


 


Urine Squamous Epithelial


Cells 


  Few /LPF


(NONE/OCC)


 


Urine Bacteria


  


  Few /HPF


(NONE)








EKG Diagnostic Results


Rate:  normal


Rhythm:  NSR


ST Segments:  no acute changes


ASA given to the pt in ED:  No





Rhythm Strip Diag. Results


EP Interpretation:  yes


Rhythm:  NSR, no PVC's, no ectopy





Chest X-Ray Diagnostic Results


Chest X-Ray Diagnostic Results :  


   Chest X-Ray Ordered:  Yes


   # of Views/Limited/Complete:  1 View


   Indication:  Shortness of Breath


   EP Interpretation:  Yes


   Interpretation:  no effusion, no pneumothorax, other - bilateral 

interstitial congestion


   Impression:  Other


   Electronically Signed by:  Electronically signed by Gómez Oliveira MD





Last Vital Signs








  Date Time  Temp Pulse Resp B/P (MAP) Pulse Ox O2 Delivery O2 Flow Rate FiO2


 


2/1/19 21:50 98.6 85 16 111/85 94 Nasal Cannula 2.0 21








Status:  improved


Disposition:  ADMITTED AS INPATIENT


Condition:  Serious


Referrals:  


NON PHYSICIAN (PCP)











Gómez Oliveira MD Feb 1, 2019 22:29

## 2019-02-01 NOTE — DIAGNOSTIC IMAGING REPORT
EXAM:

  XR Chest, 1 View

 

CLINICAL HISTORY:

  PAIN

 

TECHNIQUE:

  Frontal view of the chest.

 

COMPARISON:

  1/9/2019

 

FINDINGS:

  Lungs:  Nonspecific bilateral interstitial prominence can be seen in 

the setting of mild interstitial edema or chronic interstitial lung 

disease.  Hypoventilatory lungs.

  Pleural space:  Unremarkable.  No pneumothorax.

  Heart:  Stable cardiomediastinal silhouette.

  Mediastinum:  See above.

  Bones/joints:  No acute osseous abnormality.

 

IMPRESSION:     

  Nonspecific bilateral interstitial prominence can be seen in the 

setting of mild interstitial edema or chronic interstitial lung disease.

## 2019-02-02 VITALS — SYSTOLIC BLOOD PRESSURE: 104 MMHG | DIASTOLIC BLOOD PRESSURE: 47 MMHG

## 2019-02-02 VITALS — DIASTOLIC BLOOD PRESSURE: 72 MMHG | SYSTOLIC BLOOD PRESSURE: 131 MMHG

## 2019-02-02 VITALS — DIASTOLIC BLOOD PRESSURE: 74 MMHG | SYSTOLIC BLOOD PRESSURE: 115 MMHG

## 2019-02-02 VITALS — DIASTOLIC BLOOD PRESSURE: 65 MMHG | SYSTOLIC BLOOD PRESSURE: 120 MMHG

## 2019-02-02 VITALS — SYSTOLIC BLOOD PRESSURE: 110 MMHG | DIASTOLIC BLOOD PRESSURE: 65 MMHG

## 2019-02-02 LAB
ADD MANUAL DIFF: NO
ALBUMIN SERPL-MCNC: 2.3 G/DL (ref 3.4–5)
ANION GAP SERPL CALC-SCNC: 6 MMOL/L (ref 5–15)
BASOPHILS NFR BLD AUTO: 0.7 % (ref 0–2)
BUN SERPL-MCNC: 10 MG/DL (ref 7–18)
CALCIUM SERPL-MCNC: 9.6 MG/DL (ref 8.5–10.1)
CHLORIDE SERPL-SCNC: 103 MMOL/L (ref 98–107)
CO2 SERPL-SCNC: 30 MMOL/L (ref 21–32)
CREAT SERPL-MCNC: 0.6 MG/DL (ref 0.55–1.3)
EOSINOPHIL NFR BLD AUTO: 0.1 % (ref 0–3)
ERYTHROCYTE [DISTWIDTH] IN BLOOD BY AUTOMATED COUNT: 13.5 % (ref 11.6–14.8)
HCT VFR BLD CALC: 44.1 % (ref 37–47)
HGB BLD-MCNC: 14.7 G/DL (ref 12–16)
LYMPHOCYTES NFR BLD AUTO: 23.8 % (ref 20–45)
MCV RBC AUTO: 104 FL (ref 80–99)
MONOCYTES NFR BLD AUTO: 2.3 % (ref 1–10)
NEUTROPHILS NFR BLD AUTO: 73.2 % (ref 45–75)
PHOSPHATE SERPL-MCNC: 3.5 MG/DL (ref 2.5–4.9)
PLATELET # BLD: 218 K/UL (ref 150–450)
POTASSIUM SERPL-SCNC: 4.6 MMOL/L (ref 3.5–5.1)
RBC # BLD AUTO: 4.25 M/UL (ref 4.2–5.4)
SODIUM SERPL-SCNC: 139 MMOL/L (ref 136–145)
WBC # BLD AUTO: 8.7 K/UL (ref 4.8–10.8)

## 2019-02-02 RX ADMIN — DIVALPROEX SODIUM SCH MG: 125 CAPSULE ORAL at 08:39

## 2019-02-02 RX ADMIN — SODIUM CHLORIDE SCH MLS/HR: 9 INJECTION, SOLUTION INTRAVENOUS at 11:42

## 2019-02-02 RX ADMIN — SODIUM CHLORIDE SCH MLS/HR: 9 INJECTION, SOLUTION INTRAVENOUS at 23:58

## 2019-02-02 RX ADMIN — SODIUM CHLORIDE SCH MLS/HR: 0.9 INJECTION INTRAVENOUS at 00:34

## 2019-02-02 RX ADMIN — HEPARIN SODIUM SCH UNITS: 5000 INJECTION INTRAVENOUS; SUBCUTANEOUS at 08:47

## 2019-02-02 RX ADMIN — HEPARIN SODIUM SCH UNITS: 5000 INJECTION INTRAVENOUS; SUBCUTANEOUS at 21:04

## 2019-02-02 RX ADMIN — SODIUM CHLORIDE SCH MLS/HR: 0.9 INJECTION INTRAVENOUS at 11:00

## 2019-02-02 RX ADMIN — SODIUM CHLORIDE SCH MLS/HR: 0.9 INJECTION INTRAVENOUS at 21:00

## 2019-02-02 NOTE — HISTORY AND PHYSICAL
History of Present Illness


General


Date patient seen:  Feb 2, 2019


Time patient seen:  10:00


Reason for Hospitalization:  Upper Respiratory Illness





Present Illness


HPI


58 years old female with past medical  history of seizure disorder, 

hypothyroidism, G tube, Down syndrome,  resident of skilled nursing facility, 

presented to the emergency department for evaluation of cough and congestion,  

lasting  for several days.  Chest x-ray  at the facility revealed pneumonia.  


Upon evaluation patient was afebrile,  blood pressure was on the low side 90/55

;  patient required  supplemental oxygen,  and on 2 L of oxygen saturated 94%.  


Laboratory workup revealed leukocytosis,  stable hemoglobin and hematocrit , 

stable renal parameters and electrolytes.


Troponin negative, ECG revealed NSR, no acute ischemic changes. 


Lactic acid 1.1 .


Urinalysis revealed no evidence of UTI.


Chest x-ray showed nonspecific bilateral interstitial prominence , can be seen 

in the setting of mild interstitial edema versus chronic interstitial lung 

disease.   


Patient was  started on empiric antibiotic and admitted for further management.


Allergies:  


Coded Allergies:  


     No Known Allergies (Unverified , 1/8/19)





Medication History


Scheduled


Ascorbic Acid* (Vitamin C*), 500 MG GT DAILY, (Reported)


Docusate Sodium* (Docusate Sodium*), 100 MG GT TWICE A DAY, (Reported)


Levetiracetam (Keppra), 750 MG GT TWICE A DAY, (Reported)


Levothyroxine Sodium* (Levothyroxine Sodium*), 75 MCG GT DAILY, (Reported)


Levothyroxine Sodium* (Levothyroxine Sodium*), 125 MCG ORAL DAILY@0630


Levothyroxine Sodium* (Levothyroxine Sodium*), 125 MCG ORAL DAILY, (Reported)


Lorazepam* (Ativan*), 0.5 MG ORAL THREE TIMES A DAY, (Reported)


Lorazepam* (Ativan*), 1 MG ORAL BEDTIME, (Reported)


Magnesium Hydroxide* (Milk Of Magnesia*), 30 ML ORAL DAILY, (Reported)


Magnesium Hydroxide* (Milk Of Magnesia*), 30 ML ORAL DAILY, (Reported)


Multivitamin With Minerals (Multivitamins With Minerals*), 1 TAB ORAL DAILY, (

Reported)


Polyethylene Glycol 3350* (Miralax*), 17 GM ORAL DAILY, (Reported)


Vitamin D (Vitamin D3), 100 UNITS GT DAILY, (Reported)





Scheduled PRN


Acetaminophen* (Acetaminophen 325MG Tablet*), 650 MG GT Q4H PRN for Mild Pain (

Pain Scale 1-3), (Reported)


Guaifenesin/Codeine Phos* (Robitussin Ac*), 1 TSP ORAL Q4H PRN for For Cough, (

Reported)





Miscellaneous Medications


Acetaminophen (Tylenol), 325 MG PO, (Reported)


Calcium Carbonate/Vitamin D3 (Calcium 500 + D Tablet), 1 EACH PO, (Reported)


Divalproex Sodium (Depakote Sprinkle), 500 MG PO, (Reported)


Levetiracetam (Keppra), 500 MG IV, (Reported)





Patient History


History Provided By:  Medical Record


Healthcare decision maker





Resuscitation status


Full Code


Advanced Directive on File








Past Medical/Surgical History


Past Medical/Surgical History:  


(1) Hypothyroidism


(2) Seizure disorder


(3) Trisomy 21, Down syndrome





Review of Systems


ROS Narrative


unable to obtain ROS due to patient mental status





Physical Exam


General Appearance:  no apparent distress, other - awake, nonverbal , bedridden


Lines, tubes and drains:  peripheral


HEENT:  no JVD, other - Down syndrome facial features


Neck:  non-tender, supple


Respiratory/Chest:  other


Cardiovascular/Chest:  normal rate, no JVD


Abdomen:  normal bowel sounds, non tender, soft, feeding tube - GT with TF


Skin Exam:  warm/dry


Neurologic:  abnormal gait - bedridden , other - nonverbal


Musculoskeletal:  atrophy - BLE





Last 24 Hour Vital Signs








  Date Time  Temp Pulse Resp B/P (MAP) Pulse Ox O2 Delivery O2 Flow Rate FiO2


 


2/2/19 12:00 97.8 94 18 120/65 (83) 90   


 


2/2/19 09:00      Nasal Cannula 2.0 


 


2/2/19 08:00 98.5 64 17 104/47 (66) 95   


 


2/2/19 04:01      Nasal Cannula 2.0 


 


2/2/19 04:00 98.5 83 19 115/74 (88) 94   


 


2/1/19 21:50 98.6 85 16 111/85 94 Nasal Cannula 2.0 21


 


2/1/19 21:29 98.6 85 16 111/85 94 Nasal Cannula 2.0 


 


2/1/19 19:30        21


 


2/1/19 19:28  67 22   Nasal Cannula 2.0 28


 


2/1/19 19:25  67 22  95 Nasal Cannula 2.0 28


 


2/1/19 19:00  79 16   Nasal Cannula 2.0 99


 


2/1/19 19:00 98.6 88 16 110/87 94 Nasal Cannula 2.0 


 


2/1/19 18:48 98.6 79 16 90/55 94 Nasal Cannula 2.0 

















Intake and Output  


 


 2/1/19 2/2/19





 19:00 07:00


 


Intake Total  520 ml


 


Balance  520 ml


 


  


 


Intake IV Total  520 ml


 


# Voids  3


 


# Bowel Movements  1











Laboratory Tests








Test


  2/1/19


19:15 2/1/19


20:00 2/2/19


09:45


 


White Blood Count


  11.6 K/UL


(4.8-10.8)  H 


  8.7 K/UL


(4.8-10.8)


 


Red Blood Count


  4.39 M/UL


(4.20-5.40) 


  4.25 M/UL


(4.20-5.40)


 


Hemoglobin


  15.1 G/DL


(12.0-16.0) 


  14.7 G/DL


(12.0-16.0)


 


Hematocrit


  45.1 %


(37.0-47.0) 


  44.1 %


(37.0-47.0)


 


Mean Corpuscular Volume


  103 FL (80-99)


H 


  104 FL (80-99)


H


 


Mean Corpuscular Hemoglobin


  34.3 PG


(27.0-31.0)  H 


  34.7 PG


(27.0-31.0)  H


 


Mean Corpuscular Hemoglobin


Concent 33.4 G/DL


(32.0-36.0) 


  33.4 G/DL


(32.0-36.0)


 


Red Cell Distribution Width


  13.6 %


(11.6-14.8) 


  13.5 %


(11.6-14.8)


 


Platelet Count


  219 K/UL


(150-450) 


  218 K/UL


(150-450)


 


Mean Platelet Volume


  7.4 FL


(6.5-10.1) 


  7.6 FL


(6.5-10.1)


 


Neutrophils (%) (Auto)


  60.8 %


(45.0-75.0) 


  73.2 %


(45.0-75.0)


 


Lymphocytes (%) (Auto)


  28.3 %


(20.0-45.0) 


  23.8 %


(20.0-45.0)


 


Monocytes (%) (Auto)


  9.2 %


(1.0-10.0) 


  2.3 %


(1.0-10.0)


 


Eosinophils (%) (Auto)


  0.8 %


(0.0-3.0) 


  0.1 %


(0.0-3.0)


 


Basophils (%) (Auto)


  0.9 %


(0.0-2.0) 


  0.7 %


(0.0-2.0)


 


Sodium Level


  139 MMOL/L


(136-145) 


  139 MMOL/L


(136-145)


 


Potassium Level


  4.2 MMOL/L


(3.5-5.1) 


  4.6 MMOL/L


(3.5-5.1)


 


Chloride Level


  105 MMOL/L


() 


  103 MMOL/L


()


 


Carbon Dioxide Level


  32 MMOL/L


(21-32) 


  30 MMOL/L


(21-32)


 


Anion Gap


  2 mmol/L


(5-15)  L 


  6 mmol/L


(5-15)


 


Blood Urea Nitrogen


  11 mg/dL


(7-18) 


  10 mg/dL


(7-18)


 


Creatinine


  0.5 MG/DL


(0.55-1.30)  L 


  0.6 MG/DL


(0.55-1.30)


 


Estimat Glomerular Filtration


Rate > 60 mL/min


(>60) 


  > 60 mL/min


(>60)


 


Glucose Level


  102 MG/DL


() 


  127 MG/DL


()  H


 


Lactic Acid Level


  1.10 mmol/L


(0.4-2.0) 


  


 


 


Calcium Level


  8.7 MG/DL


(8.5-10.1) 


  9.6 MG/DL


(8.5-10.1)


 


Total Bilirubin


  0.2 MG/DL


(0.2-1.0) 


  


 


 


Aspartate Amino Transf


(AST/SGOT) 19 U/L (15-37)


  


  


 


 


Alanine Aminotransferase


(ALT/SGPT) 20 U/L (12-78)


  


  


 


 


Alkaline Phosphatase


  60 U/L


() 


  


 


 


Total Creatine Kinase


  45 U/L


() 


  


 


 


Troponin I


  0.007 ng/mL


(0.000-0.056) 


  


 


 


Total Protein


  7.0 G/DL


(6.4-8.2) 


  


 


 


Albumin


  2.2 G/DL


(3.4-5.0)  L 


  2.3 G/DL


(3.4-5.0)  L


 


Globulin 4.8 g/dL    


 


Albumin/Globulin Ratio


  0.5 (1.0-2.7)


L 


  


 


 


Urine Color  Pale yellow   


 


Urine Appearance


  


  Slightly


cloudy 


 


 


Urine pH  7 (4.5-8.0)   


 


Urine Specific Gravity


  


  1.005


(1.005-1.035) 


 


 


Urine Protein


  


  Negative


(NEGATIVE) 


 


 


Urine Glucose (UA)


  


  Negative


(NEGATIVE) 


 


 


Urine Ketones


  


  Negative


(NEGATIVE) 


 


 


Urine Blood


  


  Negative


(NEGATIVE) 


 


 


Urine Nitrite


  


  Negative


(NEGATIVE) 


 


 


Urine Bilirubin


  


  Negative


(NEGATIVE) 


 


 


Urine Urobilinogen


  


  Normal MG/DL


(0.0-1.0) 


 


 


Urine Leukocyte Esterase


  


  1+ (NEGATIVE)


H 


 


 


Urine RBC


  


  0-2 /HPF (0 -


2) 


 


 


Urine WBC


  


  2-4 /HPF (0 -


2) 


 


 


Urine Squamous Epithelial


Cells 


  Few /LPF


(NONE/OCC) 


 


 


Urine Bacteria


  


  Few /HPF


(NONE) 


 


 


Phosphorus Level


  


  


  3.5 MG/DL


(2.5-4.9)











Microbiology








 Date/Time


Source Procedure


Growth Status


 


 


 2/1/19 20:20


Rectum  Received








Height (Feet):  5


Height (Inches):  4.00


Weight (Pounds):  140


Medications





Current Medications








 Medications


  (Trade)  Dose


 Ordered  Sig/Didi


 Route


 PRN Reason  Start Time


 Stop Time Status Last Admin


Dose Admin


 


 Acetaminophen


  (Tylenol)  650 mg  Q4H  PRN


 GT


 FEVER (temp>100.5F)  2/1/19 21:15


 3/3/19 20:59   


 


 


 Albuterol/


 Ipratropium


  (Albuterol/


 Ipratropium)  3 ml  Q4H  PRN


 HHN


 Shortness of Breath  2/1/19 21:00


 2/6/19 20:59   


 


 


 Cefepime HCl 2 gm/


 Dextrose  110 ml @ 


 220 mls/hr  EVERY 12  HOURS


 IV


   2/1/19 23:00


 2/8/19 22:59  2/2/19 11:00


 


 


 Dextrose


  (Dextrose 50%)  25 ml  Q30M  PRN


 IV


 Hypoglycemia  2/2/19 06:45


 3/4/19 06:44   


 


 


 Dextrose


  (Dextrose 50%)  50 ml  Q30M  PRN


 IV


 Hypoglycemia  2/2/19 06:45


 3/4/19 06:44   


 


 


 Divalproex Sodium


  (Depakote


 Sprinkles)  500 mg  DAILY


 GT


   2/2/19 09:00


 3/4/19 08:59  2/2/19 08:39


 


 


 Heparin Sodium


  (Porcine)


  (Heparin 5000


 units/ml)  5,000 units  EVERY 12  HOURS


 SUBQ


   2/1/19 21:00


 3/3/19 20:59  2/2/19 08:47


 


 


 Insulin Aspart


  (NovoLOG)    BEFORE MEALS AND  HS


 SUBQ


   2/2/19 11:30


 3/4/19 11:29  2/2/19 12:58


 


 


 Levothyroxine


 Sodium


  (Synthroid)  75 mcg  DAILY


 GT


   2/2/19 09:00


 3/4/19 08:59  2/2/19 08:39


 


 


 Lorazepam


  (Ativan 2mg/ml


 1ml)  2 mg  Q2H  PRN


 IV


 For Anxiety  2/1/19 21:00


 2/8/19 20:59   


 


 


 Morphine Sulfate


  (Morphine


 Sulfate)  4 mg  Q4H  PRN


 IVP


 Severe Pain (Pain Scale 7-10)  2/1/19 21:00


 2/8/19 20:59   


 


 


 Ondansetron HCl


  (Zofran)  4 mg  Q6H  PRN


 IVP


 Nausea & Vomiting  2/1/19 21:00


 3/3/19 20:59   


 


 


 Polyethylene


 Glycol


  (Miralax)  17 gm  DAILYPRN  PRN


 GT


 Constipation  2/1/19 21:15


 3/3/19 20:59   


 


 


 Sodium Chloride  1,000 ml @ 


 50 mls/hr  Q20H


 IV


   2/1/19 21:00


 3/3/19 20:59  2/1/19 23:18


 


 


 Vancomycin HCl


  (Vanco rx to


 dose)  1 ea  DAILY  PRN


 MISC


 PER RX PROTOCOL  2/1/19 21:15


 3/3/19 21:14   


 


 


 Vancomycin HCl


 750 mg/Sodium


 Chloride  275 ml @ 


 183.333


 mls/hr  Q12HR@0000,1200


 IVPB


   2/2/19 12:00


 2/7/19 11:59  2/2/19 11:42


 











Assessment/Plan


Assessment/Plan


ASSESSMENT


Acute bronchitis 


Possible PNA


Aspiration risk


Dysphagia, G tube feeding


Mentally challenged 


Down syndrome 


Seizure disorder 


Hypothyroidism   


Protein calorie malnutrition 





PLAN OF CARE 


Med Surg floor


O2 titrate to keep pulse ox above 92%


HHN


Antitussive prn 


F/up with CXR on Monday 


Empiric antibiotics


fup with cx


ID consult


Strict aspiration/reflux  precaution


G-tube   feeding,  monitor tolerance


DVT prophylaxis


Seizure precautions , continue Keppra and Depakote


Continue levothyroxine ,check TSH


Bowel regimen


Pain management


Supportive care   


Dietary eval re protein supplements 





case discussed and evaluated by supervising physician











Joyce Welch NP Feb 2, 2019 15:15

## 2019-02-03 VITALS — DIASTOLIC BLOOD PRESSURE: 55 MMHG | SYSTOLIC BLOOD PRESSURE: 107 MMHG

## 2019-02-03 VITALS — SYSTOLIC BLOOD PRESSURE: 105 MMHG | DIASTOLIC BLOOD PRESSURE: 57 MMHG

## 2019-02-03 VITALS — SYSTOLIC BLOOD PRESSURE: 106 MMHG | DIASTOLIC BLOOD PRESSURE: 59 MMHG

## 2019-02-03 VITALS — SYSTOLIC BLOOD PRESSURE: 108 MMHG | DIASTOLIC BLOOD PRESSURE: 89 MMHG

## 2019-02-03 VITALS — SYSTOLIC BLOOD PRESSURE: 108 MMHG | DIASTOLIC BLOOD PRESSURE: 57 MMHG

## 2019-02-03 VITALS — SYSTOLIC BLOOD PRESSURE: 108 MMHG | DIASTOLIC BLOOD PRESSURE: 69 MMHG

## 2019-02-03 LAB
ADD MANUAL DIFF: NO
ANION GAP SERPL CALC-SCNC: 3 MMOL/L (ref 5–15)
BASOPHILS NFR BLD AUTO: 1.4 % (ref 0–2)
BUN SERPL-MCNC: 10 MG/DL (ref 7–18)
CALCIUM SERPL-MCNC: 8.8 MG/DL (ref 8.5–10.1)
CHLORIDE SERPL-SCNC: 108 MMOL/L (ref 98–107)
CO2 SERPL-SCNC: 32 MMOL/L (ref 21–32)
CREAT SERPL-MCNC: 0.5 MG/DL (ref 0.55–1.3)
EOSINOPHIL NFR BLD AUTO: 0.3 % (ref 0–3)
ERYTHROCYTE [DISTWIDTH] IN BLOOD BY AUTOMATED COUNT: 13.8 % (ref 11.6–14.8)
HCT VFR BLD CALC: 42.8 % (ref 37–47)
HGB BLD-MCNC: 14.1 G/DL (ref 12–16)
LYMPHOCYTES NFR BLD AUTO: 40.1 % (ref 20–45)
MCV RBC AUTO: 105 FL (ref 80–99)
MONOCYTES NFR BLD AUTO: 13.1 % (ref 1–10)
NEUTROPHILS NFR BLD AUTO: 45.2 % (ref 45–75)
PLATELET # BLD: 209 K/UL (ref 150–450)
POTASSIUM SERPL-SCNC: 3.9 MMOL/L (ref 3.5–5.1)
RBC # BLD AUTO: 4.07 M/UL (ref 4.2–5.4)
SODIUM SERPL-SCNC: 143 MMOL/L (ref 136–145)
WBC # BLD AUTO: 8.1 K/UL (ref 4.8–10.8)

## 2019-02-03 RX ADMIN — HEPARIN SODIUM SCH UNITS: 5000 INJECTION INTRAVENOUS; SUBCUTANEOUS at 21:01

## 2019-02-03 RX ADMIN — SODIUM CHLORIDE SCH MLS/HR: 9 INJECTION, SOLUTION INTRAVENOUS at 13:15

## 2019-02-03 RX ADMIN — SODIUM CHLORIDE SCH MLS/HR: 0.9 INJECTION INTRAVENOUS at 09:13

## 2019-02-03 RX ADMIN — DIVALPROEX SODIUM SCH MG: 125 CAPSULE ORAL at 09:10

## 2019-02-03 RX ADMIN — HEPARIN SODIUM SCH UNITS: 5000 INJECTION INTRAVENOUS; SUBCUTANEOUS at 09:12

## 2019-02-03 RX ADMIN — SODIUM CHLORIDE SCH MLS/HR: 0.9 INJECTION INTRAVENOUS at 21:01

## 2019-02-03 NOTE — CONSULTATION
History of Present Illness


General


Date patient seen:  Feb 3, 2019


Chief Complaint:  Upper Respiratory Illness





Present Illness


HPI


59 y/o F with hx of seizure disorder, hypothyroidism, s/p PEG, Down syndrome, 

SNF resident presents to ED on 2/1 with cough and congestion for several days. 

CXR at [facility] showed PNA. 


Upon admission SPB 90s, WBC elevated.





Dnies CP, SOB


Allergies:  


Coded Allergies:  


     No Known Allergies (Unverified , 1/8/19)





Medication History


Scheduled


Ascorbic Acid* (Vitamin C*), 500 MG GT DAILY, (Reported)


Docusate Sodium* (Docusate Sodium*), 100 MG GT TWICE A DAY, (Reported)


Levetiracetam (Keppra), 750 MG GT TWICE A DAY, (Reported)


Levothyroxine Sodium* (Levothyroxine Sodium*), 75 MCG GT DAILY, (Reported)


Levothyroxine Sodium* (Levothyroxine Sodium*), 125 MCG ORAL DAILY@0630


Levothyroxine Sodium* (Levothyroxine Sodium*), 125 MCG ORAL DAILY, (Reported)


Lorazepam* (Ativan*), 0.5 MG ORAL THREE TIMES A DAY, (Reported)


Lorazepam* (Ativan*), 1 MG ORAL BEDTIME, (Reported)


Magnesium Hydroxide* (Milk Of Magnesia*), 30 ML ORAL DAILY, (Reported)


Magnesium Hydroxide* (Milk Of Magnesia*), 30 ML ORAL DAILY, (Reported)


Multivitamin With Minerals (Multivitamins With Minerals*), 1 TAB ORAL DAILY, (

Reported)


Polyethylene Glycol 3350* (Miralax*), 17 GM ORAL DAILY, (Reported)


Vitamin D (Vitamin D3), 100 UNITS GT DAILY, (Reported)





Scheduled PRN


Acetaminophen* (Acetaminophen 325MG Tablet*), 650 MG GT Q4H PRN for Mild Pain (

Pain Scale 1-3), (Reported)


Guaifenesin/Codeine Phos* (Robitussin Ac*), 1 TSP ORAL Q4H PRN for For Cough, (

Reported)





Miscellaneous Medications


Acetaminophen (Tylenol), 325 MG PO, (Reported)


Calcium Carbonate/Vitamin D3 (Calcium 500 + D Tablet), 1 EACH PO, (Reported)


Divalproex Sodium (Depakote Sprinkle), 500 MG PO, (Reported)


Levetiracetam (Keppra), 500 MG IV, (Reported)





Patient History


Healthcare decision maker





Resuscitation status


Full Code


Advanced Directive on File





Patient History Narrative








Pmhx: as above





Shx:Denies: smoking, alcohol use, drug use





Fhx: non contributory





Review of Systems


All Other Systems:  negative except mentioned in HPI





Physical Exam


Physical Exam Narrative


General Appearance:  no apparent distress, other - awake, nonverbal , bedridden


Lines, tubes and drains:  peripheral


HEENT:  no JVD, other - Down syndrome facial features


Neck:  non-tender, supple


Respiratory/Chest:  other


Cardiovascular/Chest:  normal rate, no JVD


Abdomen:  normal bowel sounds, non tender, soft, feeding tube - GT with TF


Skin Exam:  warm/dry


Neurologic:  abnormal gait - bedridden , other - nonverbal


Musculoskeletal:  atrophy - BLE





Last 24 Hour Vital Signs








  Date Time  Temp Pulse Resp B/P (MAP) Pulse Ox O2 Delivery O2 Flow Rate FiO2


 


2/3/19 08:18  73 22   Nasal Cannula 2.0 28


 


2/3/19 08:00 97.1 63 20 108/69 (82) 98   


 


2/3/19 04:00 98.1 60 20 107/55 (72) 97   


 


2/3/19 00:00 98.2 76 22 105/57 (73) 99   


 


2/2/19 21:00      Nasal Cannula 2.0 


 


2/2/19 20:00 97.4 73 20 110/65 (80) 95   


 


2/2/19 16:00 98.6 71 18 131/72 (91) 96   

















Intake and Output  


 


 2/2/19 2/3/19





 18:59 06:59


 


Intake Total 220 ml 1031.666 ml


 


Balance 220 ml 1031.666 ml


 


  


 


IV Total 50 ml 986.666 ml


 


Tube Feeding 170 ml 45 ml


 


# Voids 5 2











Laboratory Tests








Test


  2/3/19


07:38 2/3/19


11:10


 


White Blood Count


  8.1 K/UL


(4.8-10.8) 


 


 


Red Blood Count


  4.07 M/UL


(4.20-5.40)  L 


 


 


Hemoglobin


  14.1 G/DL


(12.0-16.0) 


 


 


Hematocrit


  42.8 %


(37.0-47.0) 


 


 


Mean Corpuscular Volume


  105 FL (80-99)


H 


 


 


Mean Corpuscular Hemoglobin


  34.6 PG


(27.0-31.0)  H 


 


 


Mean Corpuscular Hemoglobin


Concent 32.9 G/DL


(32.0-36.0) 


 


 


Red Cell Distribution Width


  13.8 %


(11.6-14.8) 


 


 


Platelet Count


  209 K/UL


(150-450) 


 


 


Mean Platelet Volume


  6.9 FL


(6.5-10.1) 


 


 


Neutrophils (%) (Auto)


  45.2 %


(45.0-75.0) 


 


 


Lymphocytes (%) (Auto)


  40.1 %


(20.0-45.0) 


 


 


Monocytes (%) (Auto)


  13.1 %


(1.0-10.0)  H 


 


 


Eosinophils (%) (Auto)


  0.3 %


(0.0-3.0) 


 


 


Basophils (%) (Auto)


  1.4 %


(0.0-2.0) 


 


 


Sodium Level


  143 MMOL/L


(136-145) 


 


 


Potassium Level


  3.9 MMOL/L


(3.5-5.1) 


 


 


Chloride Level


  108 MMOL/L


()  H 


 


 


Carbon Dioxide Level


  32 MMOL/L


(21-32) 


 


 


Anion Gap


  3 mmol/L


(5-15)  L 


 


 


Blood Urea Nitrogen


  10 mg/dL


(7-18) 


 


 


Creatinine


  0.5 MG/DL


(0.55-1.30)  L 


 


 


Estimat Glomerular Filtration


Rate > 60 mL/min


(>60) 


 


 


Glucose Level


  87 MG/DL


() 


 


 


Calcium Level


  8.8 MG/DL


(8.5-10.1) 


 


 


Thyroid Stimulating Hormone


(TSH) 0.349 uiU/mL


(0.358-3.740) 


 


 


Vancomycin Level Trough  Pending  











Microbiology








 Date/Time


Source Procedure


Growth Status


 


 


 2/2/19 14:44


Sputum Gram Stain - Final Resulted


 


 2/2/19 14:44


Sputum Sputum Culture


Pending Resulted








Height (Feet):  5


Height (Inches):  4.00


Weight (Pounds):  140


Medications





Current Medications








 Medications


  (Trade)  Dose


 Ordered  Sig/Didi


 Route


 PRN Reason  Start Time


 Stop Time Status Last Admin


Dose Admin


 


 Acetaminophen


  (Tylenol)  650 mg  Q4H  PRN


 GT


 FEVER (temp>100.5F)  2/1/19 21:15


 3/3/19 20:59   


 


 


 Albuterol/


 Ipratropium


  (Albuterol/


 Ipratropium)  3 ml  Q4H  PRN


 HHN


 Shortness of Breath  2/1/19 21:00


 2/6/19 20:59   


 


 


 Cefepime HCl 2 gm/


 Dextrose  110 ml @ 


 220 mls/hr  EVERY 12  HOURS


 IV


   2/1/19 23:00


 2/8/19 22:59  2/3/19 09:13


 


 


 Divalproex Sodium


  (Depakote


 Sprinkles)  500 mg  DAILY


 GT


   2/2/19 09:00


 3/4/19 08:59  2/3/19 09:10


 


 


 Heparin Sodium


  (Porcine)


  (Heparin 5000


 units/ml)  5,000 units  EVERY 12  HOURS


 SUBQ


   2/1/19 21:00


 3/3/19 20:59  2/3/19 09:12


 


 


 Levetiracetam


  (Keppra)  750 mg  Q12HR


 ORAL


   2/2/19 21:00


 3/4/19 20:59  2/3/19 09:10


 


 


 Levothyroxine


 Sodium


  (Synthroid)  75 mcg  DAILY


 GT


   2/2/19 09:00


 3/4/19 08:59  2/3/19 09:10


 


 


 Lorazepam


  (Ativan 2mg/ml


 1ml)  2 mg  Q2H  PRN


 IV


 For Anxiety  2/1/19 21:00


 2/8/19 20:59   


 


 


 Morphine Sulfate


  (Morphine


 Sulfate)  4 mg  Q4H  PRN


 IVP


 Severe Pain (Pain Scale 7-10)  2/1/19 21:00


 2/8/19 20:59   


 


 


 Ondansetron HCl


  (Zofran)  4 mg  Q6H  PRN


 IVP


 Nausea & Vomiting  2/1/19 21:00


 3/3/19 20:59   


 


 


 Polyethylene


 Glycol


  (Miralax)  17 gm  DAILYPRN  PRN


 GT


 Constipation  2/1/19 21:15


 3/3/19 20:59   


 


 


 Sodium Chloride  1,000 ml @ 


 50 mls/hr  Q20H


 IV


   2/1/19 21:00


 3/3/19 20:59  2/2/19 17:27


 


 


 Vancomycin HCl


  (Vanco rx to


 dose)  1 ea  DAILY  PRN


 MISC


 PER RX PROTOCOL  2/1/19 21:15


 3/3/19 21:14   


 


 


 Vancomycin HCl


 750 mg/Sodium


 Chloride  275 ml @ 


 183.333


 mls/hr  Q12HR@0000,1200


 IVPB


   2/2/19 12:00


 2/7/19 11:59  2/2/19 23:58


 











Assessment/Plan


Assessment/Plan


Abx:


IV Vancomycin 2/1-


Cefepime 2/1-





Assessment:


Probable PNA


  -CXR:  Nonspecific bilateral interstitial prominence can be seen in the 

setting of mild interstitial edema or chronic interstitial lung  disease.  

Hypoventilatory lungs.


  sp cx p





Mild leukocytosis, SP


Afebrile





Gram positive bacteremia- reaL vs contaminant





seizure disorder


 hypothyroidism


s/p PEG


Down syndrome


 SNF resident





Plan:


-Continue empiric IV Vancomycin and Cefepime pending cultures


-Bcx x2


-f/u cx


-Monitor CBC/CMP, temperatures


-influenza sc


-aspiration precautions





Thank you for this consultation. Will continue to follow along with you.





Discussed with Rema Mata M.D. Feb 3, 2019 12:20

## 2019-02-03 NOTE — PULMONOLOGY PROGRESS NOTE
Assessment/Plan


Assessment/Plan


ASSESSMENT


Acute bronchitis 


Possible PNA


Aspiration risk


Dysphagia, G tube feeding


Mentally challenged 


Down syndrome 


Seizure disorder 


Hypothyroidism   


Protein calorie malnutrition 





PLAN OF CARE 


Med Surg floor


O2 titrate to keep pulse ox above 92%


HHN


Antitussive prn 


F/up with CXR on Monday 


Empiric antibiotics


fup with cx, sputum cx pending


ID consult


Strict aspiration/reflux  precaution


G-tube   feeding,  monitor tolerance


DVT prophylaxis


Seizure precautions , continue Keppra and Depakote


Continue levothyroxine ,  TSH slightly low, will not change dose of 

Levothyroxine, very minimal change, repeat TSH in 4 wks 


Bowel regimen


Pain management


Supportive care   


Dietary eval re protein supplements and implements recs in POC  





case discussed and evaluated by supervising physician





Subjective


Allergies:  


Coded Allergies:  


     No Known Allergies (Unverified , 1/8/19)


Subjective


leukocytosis resolved, afebrile, 


more awake and interactive





Objective





Last 24 Hour Vital Signs








  Date Time  Temp Pulse Resp B/P (MAP) Pulse Ox O2 Delivery O2 Flow Rate FiO2


 


2/3/19 04:00 98.1 60 20 107/55 (72) 97   


 


2/3/19 00:00 98.2 76 22 105/57 (73) 99   


 


2/2/19 21:00      Nasal Cannula 2.0 


 


2/2/19 20:00 97.4 73 20 110/65 (80) 95   


 


2/2/19 16:00 98.6 71 18 131/72 (91) 96   


 


2/2/19 12:00 97.8 94 18 120/65 (83) 90   


 


2/2/19 09:00      Nasal Cannula 2.0 

















Intake and Output  


 


 2/2/19 2/3/19





 19:00 07:00


 


Intake Total 220 ml 981.666 ml


 


Balance 220 ml 981.666 ml


 


  


 


Intake IV Total 50 ml 936.666 ml


 


Tube Feeding 170 ml 45 ml


 


# Voids 5 2








Objective


General Appearance:  no apparent distress, awake, speech incomprehensible , 

bedridden


Lines, tubes and drains:  peripheral


HEENT:  no JVD,  facial features with evidence of  Down syndrome


Neck:  non-tender, supple


Respiratory/Chest:  other


Cardiovascular/Chest:  normal rate, no JVD


Abdomen:  normal bowel sounds, non tender, soft, feeding tube - GT with TF


Skin Exam:  warm/dry


Neurologic:    bedridden ,   nonverbal


Musculoskeletal:  atrophy - BLE, atrophic toe nails





Microbiology








 Date/Time


Source Procedure


Growth Status


 


 


 2/1/19 20:03


Blood Blood Culture - Preliminary


NO GROWTH AFTER 24 HOURS Resulted


 


 2/1/19 19:47


Blood Blood Culture - Preliminary


NO GROWTH AFTER 24 HOURS Resulted





 2/2/19 14:44


Sputum Gram Stain - Final Resulted


 


 2/2/19 14:44


Sputum Sputum Culture


Pending Resulted


 


 2/1/19 20:20


Rectum  Received








Laboratory Tests


2/2/19 09:45: 


White Blood Count 8.7, Red Blood Count 4.25, Hemoglobin 14.7, Hematocrit 44.1, 

Mean Corpuscular Volume 104H, Mean Corpuscular Hemoglobin 34.7H, Mean 

Corpuscular Hemoglobin Concent 33.4, Red Cell Distribution Width 13.5, Platelet 

Count 218, Mean Platelet Volume 7.6, Neutrophils (%) (Auto) 73.2, Lymphocytes (%

) (Auto) 23.8, Monocytes (%) (Auto) 2.3, Eosinophils (%) (Auto) 0.1, Basophils (

%) (Auto) 0.7, Sodium Level 139, Potassium Level 4.6, Chloride Level 103, 

Carbon Dioxide Level 30, Anion Gap 6, Blood Urea Nitrogen 10, Creatinine 0.6, 

Estimat Glomerular Filtration Rate > 60, Glucose Level 127H, Calcium Level 9.6, 

Phosphorus Level 3.5, Albumin 2.3L





Current Medications








 Medications


  (Trade)  Dose


 Ordered  Sig/Didi


 Route


 PRN Reason  Start Time


 Stop Time Status Last Admin


Dose Admin


 


 Acetaminophen


  (Tylenol)  650 mg  Q4H  PRN


 GT


 FEVER (temp>100.5F)  2/1/19 21:15


 3/3/19 20:59   


 


 


 Albuterol/


 Ipratropium


  (Albuterol/


 Ipratropium)  3 ml  Q4H  PRN


 HHN


 Shortness of Breath  2/1/19 21:00


 2/6/19 20:59   


 


 


 Cefepime HCl 2 gm/


 Dextrose  110 ml @ 


 220 mls/hr  EVERY 12  HOURS


 IV


   2/1/19 23:00


 2/8/19 22:59  2/2/19 21:00


 


 


 Divalproex Sodium


  (Depakote


 Sprinkles)  500 mg  DAILY


 GT


   2/2/19 09:00


 3/4/19 08:59  2/2/19 08:39


 


 


 Heparin Sodium


  (Porcine)


  (Heparin 5000


 units/ml)  5,000 units  EVERY 12  HOURS


 SUBQ


   2/1/19 21:00


 3/3/19 20:59  2/2/19 21:04


 


 


 Levetiracetam


  (Keppra)  750 mg  Q12HR


 ORAL


   2/2/19 21:00


 3/4/19 20:59  2/2/19 21:00


 


 


 Levothyroxine


 Sodium


  (Synthroid)  75 mcg  DAILY


 GT


   2/2/19 09:00


 3/4/19 08:59  2/2/19 08:39


 


 


 Lorazepam


  (Ativan 2mg/ml


 1ml)  2 mg  Q2H  PRN


 IV


 For Anxiety  2/1/19 21:00


 2/8/19 20:59   


 


 


 Morphine Sulfate


  (Morphine


 Sulfate)  4 mg  Q4H  PRN


 IVP


 Severe Pain (Pain Scale 7-10)  2/1/19 21:00


 2/8/19 20:59   


 


 


 Ondansetron HCl


  (Zofran)  4 mg  Q6H  PRN


 IVP


 Nausea & Vomiting  2/1/19 21:00


 3/3/19 20:59   


 


 


 Polyethylene


 Glycol


  (Miralax)  17 gm  DAILYPRN  PRN


 GT


 Constipation  2/1/19 21:15


 3/3/19 20:59   


 


 


 Sodium Chloride  1,000 ml @ 


 50 mls/hr  Q20H


 IV


   2/1/19 21:00


 3/3/19 20:59  2/2/19 17:27


 


 


 Vancomycin HCl


  (Vanco rx to


 dose)  1 ea  DAILY  PRN


 MISC


 PER RX PROTOCOL  2/1/19 21:15


 3/3/19 21:14   


 


 


 Vancomycin HCl


 750 mg/Sodium


 Chloride  275 ml @ 


 183.333


 mls/hr  Q12HR@0000,1200


 IVPB


   2/2/19 12:00


 2/7/19 11:59  2/2/19 23:58


 

















Joyce Welch NP Feb 3, 2019 08:15

## 2019-02-04 VITALS — SYSTOLIC BLOOD PRESSURE: 112 MMHG | DIASTOLIC BLOOD PRESSURE: 58 MMHG

## 2019-02-04 VITALS — SYSTOLIC BLOOD PRESSURE: 110 MMHG | DIASTOLIC BLOOD PRESSURE: 67 MMHG

## 2019-02-04 VITALS — SYSTOLIC BLOOD PRESSURE: 105 MMHG | DIASTOLIC BLOOD PRESSURE: 54 MMHG

## 2019-02-04 VITALS — DIASTOLIC BLOOD PRESSURE: 79 MMHG | SYSTOLIC BLOOD PRESSURE: 110 MMHG

## 2019-02-04 VITALS — SYSTOLIC BLOOD PRESSURE: 119 MMHG | DIASTOLIC BLOOD PRESSURE: 58 MMHG

## 2019-02-04 VITALS — DIASTOLIC BLOOD PRESSURE: 48 MMHG | SYSTOLIC BLOOD PRESSURE: 96 MMHG

## 2019-02-04 LAB
ADD MANUAL DIFF: NO
ANION GAP SERPL CALC-SCNC: 5 MMOL/L (ref 5–15)
BASOPHILS NFR BLD AUTO: 2 % (ref 0–2)
BUN SERPL-MCNC: 13 MG/DL (ref 7–18)
CALCIUM SERPL-MCNC: 9.1 MG/DL (ref 8.5–10.1)
CHLORIDE SERPL-SCNC: 108 MMOL/L (ref 98–107)
CO2 SERPL-SCNC: 29 MMOL/L (ref 21–32)
CREAT SERPL-MCNC: 0.4 MG/DL (ref 0.55–1.3)
EOSINOPHIL NFR BLD AUTO: 1.2 % (ref 0–3)
ERYTHROCYTE [DISTWIDTH] IN BLOOD BY AUTOMATED COUNT: 13.8 % (ref 11.6–14.8)
HCT VFR BLD CALC: 39.7 % (ref 37–47)
HGB BLD-MCNC: 13 G/DL (ref 12–16)
LYMPHOCYTES NFR BLD AUTO: 37.3 % (ref 20–45)
MCV RBC AUTO: 105 FL (ref 80–99)
MONOCYTES NFR BLD AUTO: 14 % (ref 1–10)
NEUTROPHILS NFR BLD AUTO: 45.5 % (ref 45–75)
PLATELET # BLD: 248 K/UL (ref 150–450)
POTASSIUM SERPL-SCNC: 3.6 MMOL/L (ref 3.5–5.1)
RBC # BLD AUTO: 3.79 M/UL (ref 4.2–5.4)
SODIUM SERPL-SCNC: 142 MMOL/L (ref 136–145)
WBC # BLD AUTO: 5.3 K/UL (ref 4.8–10.8)

## 2019-02-04 RX ADMIN — DIVALPROEX SODIUM SCH MG: 125 CAPSULE ORAL at 08:10

## 2019-02-04 RX ADMIN — SODIUM CHLORIDE SCH MLS/HR: 0.9 INJECTION INTRAVENOUS at 22:32

## 2019-02-04 RX ADMIN — HEPARIN SODIUM SCH UNITS: 5000 INJECTION INTRAVENOUS; SUBCUTANEOUS at 08:17

## 2019-02-04 RX ADMIN — SODIUM CHLORIDE SCH MLS/HR: 9 INJECTION, SOLUTION INTRAVENOUS at 00:18

## 2019-02-04 RX ADMIN — SODIUM CHLORIDE SCH MLS/HR: 9 INJECTION, SOLUTION INTRAVENOUS at 11:36

## 2019-02-04 RX ADMIN — HEPARIN SODIUM SCH UNITS: 5000 INJECTION INTRAVENOUS; SUBCUTANEOUS at 22:33

## 2019-02-04 RX ADMIN — SODIUM CHLORIDE SCH MLS/HR: 9 INJECTION, SOLUTION INTRAVENOUS at 23:35

## 2019-02-04 RX ADMIN — SODIUM CHLORIDE SCH MLS/HR: 0.9 INJECTION INTRAVENOUS at 08:10

## 2019-02-04 NOTE — INFECTIOUS DISEASES PROG NOTE
Assessment/Plan


Assessment/Plan





Abx:


IV Vancomycin 2/1-


Cefepime 2/1-





Assessment:


Probable PNA


  -CXR:  Nonspecific bilateral interstitial prominence can be seen in the 

setting of mild interstitial edema or chronic interstitial lung  disease.  

Hypoventilatory lungs.


  sp cx p





Mild leukocytosis, SP


Afebrile





Gram positive bacteremia- reaL vs contaminant





seizure disorder


 hypothyroidism


s/p PEG


Down syndrome


 [facility] resident





Plan:


-Continue empiric IV Vancomycin #4/7 and Cefepime #4/7 pending cultures


-Bcx x2


-f/u cx


-Monitor CBC/CMP, temperatures


-influenza sc


-aspiration precautions





Thank you for this consultation. Will continue to follow along with you.





Subjective


Allergies:  


Coded Allergies:  


     No Known Allergies (Unverified , 1/8/19)


Subjective


Afebrile


Leukocytosis resolved





Objective


Vital Signs





Last 24 Hour Vital Signs








  Date Time  Temp Pulse Resp B/P (MAP) Pulse Ox O2 Delivery O2 Flow Rate FiO2


 


2/4/19 11:55 97.1 64 20 110/67 (81) 97   


 


2/4/19 09:00      Nasal Cannula 2.0 


 


2/4/19 08:11  55 18   Nasal Cannula 2.0 28


 


2/4/19 08:00 97.4 76 20 119/58 (78) 97   


 


2/4/19 04:00 98.2 64 18 112/58 (76) 95   


 


2/4/19 00:53  74 18   Nasal Cannula 2.0 28


 


2/4/19 00:00 97.6 60 19 105/54 (71) 95   


 


2/3/19 21:00      Nasal Cannula 2.0 


 


2/3/19 20:00 97.8 63 19 108/57 (74) 93   


 


2/3/19 16:00 97.7 62 18 106/59 (75) 94   








Height (Feet):  5


Height (Inches):  4.00


Weight (Pounds):  140


Objective


General Appearance:  NAD - awake, nonverbal , bedridden


HEENT: NCAT, MMM, Down syndrome facial features


Respiratory/Chest:  Course B/L


Cardiovascular/Chest:  normal rate, no JVD


Abdomen:  normal bowel sounds, non tender, soft, feeding tube - GT with TF





Microbiology








 Date/Time


Source Procedure


Growth Status


 


 


 2/1/19 20:03


Blood Blood Culture - Preliminary


Gram Positive Cocci Resulted


 


 2/1/19 19:47


Blood Blood Culture - Preliminary


Gram Positive Cocci Resulted





 2/2/19 14:44


Sputum Gram Stain - Final Resulted


 


 2/2/19 14:44


Sputum Sputum Culture - Preliminary


NORMAL UPPER RESPIRATORY CLARENCE AT 48 ... Resulted


 


 2/1/19 20:20


Rectum VRE Culture - Final


Enterococcus Faecium - Vre Complete











Laboratory Tests








Test


  2/4/19


06:03


 


White Blood Count


  5.3 K/UL


(4.8-10.8)


 


Red Blood Count


  3.79 M/UL


(4.20-5.40)  L


 


Hemoglobin


  13.0 G/DL


(12.0-16.0)


 


Hematocrit


  39.7 %


(37.0-47.0)


 


Mean Corpuscular Volume


  105 FL (80-99)


H


 


Mean Corpuscular Hemoglobin


  34.2 PG


(27.0-31.0)  H


 


Mean Corpuscular Hemoglobin


Concent 32.7 G/DL


(32.0-36.0)


 


Red Cell Distribution Width


  13.8 %


(11.6-14.8)


 


Platelet Count


  248 K/UL


(150-450)


 


Mean Platelet Volume


  6.8 FL


(6.5-10.1)


 


Neutrophils (%) (Auto)


  45.5 %


(45.0-75.0)


 


Lymphocytes (%) (Auto)


  37.3 %


(20.0-45.0)


 


Monocytes (%) (Auto)


  14.0 %


(1.0-10.0)  H


 


Eosinophils (%) (Auto)


  1.2 %


(0.0-3.0)


 


Basophils (%) (Auto)


  2.0 %


(0.0-2.0)


 


Sodium Level


  142 MMOL/L


(136-145)


 


Potassium Level


  3.6 MMOL/L


(3.5-5.1)


 


Chloride Level


  108 MMOL/L


()  H


 


Carbon Dioxide Level


  29 MMOL/L


(21-32)


 


Anion Gap


  5 mmol/L


(5-15)


 


Blood Urea Nitrogen


  13 mg/dL


(7-18)


 


Creatinine


  0.4 MG/DL


(0.55-1.30)  L


 


Estimat Glomerular Filtration


Rate > 60 mL/min


(>60)


 


Glucose Level


  115 MG/DL


()  H


 


Calcium Level


  9.1 MG/DL


(8.5-10.1)











Current Medications








 Medications


  (Trade)  Dose


 Ordered  Sig/Didi


 Route


 PRN Reason  Start Time


 Stop Time Status Last Admin


Dose Admin


 


 Acetaminophen


  (Tylenol)  650 mg  Q4H  PRN


 GT


 FEVER (temp>100.5F)  2/1/19 21:15


 3/3/19 20:59   


 


 


 Albuterol/


 Ipratropium


  (Albuterol/


 Ipratropium)  3 ml  Q4H  PRN


 HHN


 Shortness of Breath  2/1/19 21:00


 2/6/19 20:59   


 


 


 Cefepime HCl 2 gm/


 Dextrose  110 ml @ 


 220 mls/hr  EVERY 12  HOURS


 IV


   2/1/19 23:00


 2/8/19 22:59  2/4/19 08:10


 


 


 Divalproex Sodium


  (Depakote


 Sprinkles)  500 mg  DAILY


 GT


   2/2/19 09:00


 3/4/19 08:59  2/4/19 08:10


 


 


 Heparin Sodium


  (Porcine)


  (Heparin 5000


 units/ml)  5,000 units  EVERY 12  HOURS


 SUBQ


   2/1/19 21:00


 3/3/19 20:59  2/4/19 08:17


 


 


 Levetiracetam


  (Keppra)  750 mg  Q12HR


 ORAL


   2/2/19 21:00


 3/4/19 20:59  2/4/19 08:10


 


 


 Levothyroxine


 Sodium


  (Synthroid)  75 mcg  DAILY


 GT


   2/2/19 09:00


 3/4/19 08:59  2/4/19 08:10


 


 


 Lorazepam


  (Ativan 2mg/ml


 1ml)  2 mg  Q2H  PRN


 IV


 For Anxiety  2/1/19 21:00


 2/8/19 20:59   


 


 


 Morphine Sulfate


  (Morphine


 Sulfate)  4 mg  Q4H  PRN


 IVP


 Severe Pain (Pain Scale 7-10)  2/1/19 21:00


 2/8/19 20:59   


 


 


 Ondansetron HCl


  (Zofran)  4 mg  Q6H  PRN


 IVP


 Nausea & Vomiting  2/1/19 21:00


 3/3/19 20:59   


 


 


 Polyethylene


 Glycol


  (Miralax)  17 gm  DAILYPRN  PRN


 GT


 Constipation  2/1/19 21:15


 3/3/19 20:59   


 


 


 Sodium Chloride  1,000 ml @ 


 50 mls/hr  Q20H


 IV


   2/1/19 21:00


 3/3/19 20:59  2/4/19 08:10


 


 


 Vancomycin HCl


  (Vanco rx to


 dose)  1 ea  DAILY  PRN


 MISC


 PER RX PROTOCOL  2/1/19 21:15


 3/3/19 21:14   


 


 


 Vancomycin HCl


 750 mg/Sodium


 Chloride  275 ml @ 


 183.333


 mls/hr  Q12HR@0000,1200


 IVPB


   2/2/19 12:00


 2/7/19 11:59  2/4/19 11:36


 

















Elfego Mcmahon MD Feb 4, 2019 12:11

## 2019-02-04 NOTE — PULMONOLOGY PROGRESS NOTE
Assessment/Plan


Problems:  


(1) Pneumonia


(2) Trisomy 21, Down syndrome


(3) Seizure disorder


(4) Hypothyroidism


Assessment/Plan


f/u cultures


continue abx


check electroltyes


might get discharged once we know the cultures


dvt prophylaxis.





Subjective


ROS Limited/Unobtainable:  Yes


Allergies:  


Coded Allergies:  


     No Known Allergies (Unverified , 1/8/19)





Objective





Last 24 Hour Vital Signs








  Date Time  Temp Pulse Resp B/P (MAP) Pulse Ox O2 Delivery O2 Flow Rate FiO2


 


2/4/19 11:55 97.1 64 20 110/67 (81) 97   


 


2/4/19 09:00      Nasal Cannula 2.0 


 


2/4/19 08:11  55 18   Nasal Cannula 2.0 28


 


2/4/19 08:00 97.4 76 20 119/58 (78) 97   


 


2/4/19 04:00 98.2 64 18 112/58 (76) 95   


 


2/4/19 00:53  74 18   Nasal Cannula 2.0 28


 


2/4/19 00:00 97.6 60 19 105/54 (71) 95   


 


2/3/19 21:00      Nasal Cannula 2.0 


 


2/3/19 20:00 97.8 63 19 108/57 (74) 93   


 


2/3/19 16:00 97.7 62 18 106/59 (75) 94   

















Intake and Output  


 


 2/3/19 2/4/19





 18:59 06:59


 


Intake Total 1430 ml 1576.666 ml


 


Balance 1430 ml 1576.666 ml


 


  


 


Free Water 300 ml 


 


IV Total 860 ml 1036.666 ml


 


Tube Feeding 270 ml 540 ml


 


# Voids 3 1








HEENT:  normocephalic, atraumatic


Respiratory/Chest:  chest wall non-tender, normal breath sounds


Breasts:  no masses


Cardiovascular:  regular rhythm


Genitourinary:  normal external genitalia


Extremities:  no clubbing


Skin:  no lesions





Microbiology








 Date/Time


Source Procedure


Growth Status


 


 


 2/1/19 20:03


Blood Blood Culture - Preliminary


Gram Positive Cocci Resulted


 


 2/1/19 19:47


Blood Blood Culture - Preliminary


Gram Positive Cocci Resulted





 2/2/19 14:44


Sputum Gram Stain - Final Resulted


 


 2/2/19 14:44


Sputum Sputum Culture - Preliminary


NORMAL UPPER RESPIRATORY CLARENCE AT 48 ... Resulted


 


 2/1/19 20:20


Rectum VRE Culture - Final


Enterococcus Faecium - Vre Complete








Laboratory Tests


2/4/19 06:03: 


White Blood Count 5.3, Red Blood Count 3.79L, Hemoglobin 13.0, Hematocrit 39.7, 

Mean Corpuscular Volume 105H, Mean Corpuscular Hemoglobin 34.2H, Mean 

Corpuscular Hemoglobin Concent 32.7, Red Cell Distribution Width 13.8, Platelet 

Count 248, Mean Platelet Volume 6.8, Neutrophils (%) (Auto) 45.5, Lymphocytes (%

) (Auto) 37.3, Monocytes (%) (Auto) 14.0H, Eosinophils (%) (Auto) 1.2, 

Basophils (%) (Auto) 2.0, Sodium Level 142, Potassium Level 3.6, Chloride Level 

108H, Carbon Dioxide Level 29, Anion Gap 5, Blood Urea Nitrogen 13, Creatinine 

0.4L, Estimat Glomerular Filtration Rate > 60, Glucose Level 115H, Calcium 

Level 9.1





Current Medications








 Medications


  (Trade)  Dose


 Ordered  Sig/Didi


 Route


 PRN Reason  Start Time


 Stop Time Status Last Admin


Dose Admin


 


 Acetaminophen


  (Tylenol)  650 mg  Q4H  PRN


 GT


 FEVER (temp>100.5F)  2/1/19 21:15


 3/3/19 20:59   


 


 


 Albuterol/


 Ipratropium


  (Albuterol/


 Ipratropium)  3 ml  Q4H  PRN


 HHN


 Shortness of Breath  2/1/19 21:00


 2/6/19 20:59   


 


 


 Cefepime HCl 2 gm/


 Dextrose  110 ml @ 


 220 mls/hr  EVERY 12  HOURS


 IV


   2/1/19 23:00


 2/8/19 22:59  2/4/19 08:10


 


 


 Divalproex Sodium


  (Depakote


 Sprinkles)  500 mg  DAILY


 GT


   2/2/19 09:00


 3/4/19 08:59  2/4/19 08:10


 


 


 Heparin Sodium


  (Porcine)


  (Heparin 5000


 units/ml)  5,000 units  EVERY 12  HOURS


 SUBQ


   2/1/19 21:00


 3/3/19 20:59  2/4/19 08:17


 


 


 Levetiracetam


  (Keppra)  750 mg  Q12HR


 ORAL


   2/2/19 21:00


 3/4/19 20:59  2/4/19 08:10


 


 


 Levothyroxine


 Sodium


  (Synthroid)  75 mcg  DAILY


 GT


   2/2/19 09:00


 3/4/19 08:59  2/4/19 08:10


 


 


 Lorazepam


  (Ativan 2mg/ml


 1ml)  2 mg  Q2H  PRN


 IV


 For Anxiety  2/1/19 21:00


 2/8/19 20:59   


 


 


 Morphine Sulfate


  (Morphine


 Sulfate)  4 mg  Q4H  PRN


 IVP


 Severe Pain (Pain Scale 7-10)  2/1/19 21:00


 2/8/19 20:59   


 


 


 Ondansetron HCl


  (Zofran)  4 mg  Q6H  PRN


 IVP


 Nausea & Vomiting  2/1/19 21:00


 3/3/19 20:59   


 


 


 Polyethylene


 Glycol


  (Miralax)  17 gm  DAILYPRN  PRN


 GT


 Constipation  2/1/19 21:15


 3/3/19 20:59   


 


 


 Sodium Chloride  1,000 ml @ 


 50 mls/hr  Q20H


 IV


   2/1/19 21:00


 3/3/19 20:59  2/4/19 08:10


 


 


 Vancomycin HCl


  (Vanco rx to


 dose)  1 ea  DAILY  PRN


 MISC


 PER RX PROTOCOL  2/1/19 21:15


 3/3/19 21:14   


 


 


 Vancomycin HCl 1


 gm/Dextrose  275 ml @ 


 183.708


 mls/hr  Q12H


 IVPB


   2/4/19 23:00


 2/9/19 22:59   


 

















Chano Cherry MD Feb 4, 2019 15:01

## 2019-02-04 NOTE — DIAGNOSTIC IMAGING REPORT
Indication: Dyspnea

 

Comparison:  2/1/2019

 

A single view chest radiograph was obtained.

 

Findings:

 

Heart size is stable. There is patchy interstitial prominence at the lung bases

probably chronic. No change compared to prior exam. Bones are osteopenic.

 

IMPRESSION:

 

No acute disease

## 2019-02-05 VITALS — SYSTOLIC BLOOD PRESSURE: 116 MMHG | DIASTOLIC BLOOD PRESSURE: 64 MMHG

## 2019-02-05 VITALS — DIASTOLIC BLOOD PRESSURE: 55 MMHG | SYSTOLIC BLOOD PRESSURE: 105 MMHG

## 2019-02-05 VITALS — DIASTOLIC BLOOD PRESSURE: 66 MMHG | SYSTOLIC BLOOD PRESSURE: 118 MMHG

## 2019-02-05 VITALS — DIASTOLIC BLOOD PRESSURE: 78 MMHG | SYSTOLIC BLOOD PRESSURE: 152 MMHG

## 2019-02-05 VITALS — SYSTOLIC BLOOD PRESSURE: 112 MMHG | DIASTOLIC BLOOD PRESSURE: 58 MMHG

## 2019-02-05 VITALS — SYSTOLIC BLOOD PRESSURE: 109 MMHG | DIASTOLIC BLOOD PRESSURE: 62 MMHG

## 2019-02-05 RX ADMIN — SODIUM CHLORIDE SCH MLS/HR: 0.9 INJECTION INTRAVENOUS at 09:26

## 2019-02-05 RX ADMIN — LEVETIRACETAM SCH MG: 100 SOLUTION ORAL at 09:25

## 2019-02-05 RX ADMIN — SODIUM CHLORIDE SCH MLS/HR: 9 INJECTION, SOLUTION INTRAVENOUS at 11:36

## 2019-02-05 RX ADMIN — LEVETIRACETAM SCH MG: 100 SOLUTION ORAL at 21:13

## 2019-02-05 RX ADMIN — SODIUM CHLORIDE SCH MLS/HR: 0.9 INJECTION INTRAVENOUS at 21:12

## 2019-02-05 RX ADMIN — HEPARIN SODIUM SCH UNITS: 5000 INJECTION INTRAVENOUS; SUBCUTANEOUS at 21:12

## 2019-02-05 RX ADMIN — HEPARIN SODIUM SCH UNITS: 5000 INJECTION INTRAVENOUS; SUBCUTANEOUS at 09:31

## 2019-02-05 RX ADMIN — SODIUM CHLORIDE SCH MLS/HR: 9 INJECTION, SOLUTION INTRAVENOUS at 23:10

## 2019-02-05 RX ADMIN — DIVALPROEX SODIUM SCH MG: 125 CAPSULE ORAL at 09:24

## 2019-02-05 NOTE — INFECTIOUS DISEASES PROG NOTE
Assessment/Plan


Assessment/Plan





Abx:


IV Vancomycin 2/1-


Cefepime 2/1-





Assessment:


Probable PNA


  -CXR:  Nonspecific bilateral interstitial prominence can be seen in the 

setting of mild interstitial edema or chronic interstitial lung  disease.  

Hypoventilatory lungs.


  sp cx p





Mild leukocytosis, SP


Afebrile





Gram positive bacteremia- reaL vs contaminant





seizure disorder


 hypothyroidism


s/p PEG


Down syndrome


 SN[facility] resident





Plan:


-Continue empiric IV Vancomycin #5/7 and Cefepime #5/7 pending cultures


-Bcx x2


-f/u cx


-Monitor CBC/CMP, temperatures


-influenza sc


-aspiration precautions





Thank you for this consultation. Will continue to follow along with you.





Subjective


Allergies:  


Coded Allergies:  


     No Known Allergies (Unverified , 1/8/19)


Subjective


Afebrile


No Leukocytosis





Objective


Vital Signs





Last 24 Hour Vital Signs








  Date Time  Temp Pulse Resp B/P (MAP) Pulse Ox O2 Delivery O2 Flow Rate FiO2


 


2/5/19 04:00 98.3 65 20 109/62 (78) 97   


 


2/5/19 00:00 98.0 78 18 152/78 (102) 100   


 


2/4/19 21:00      Nasal Cannula 2.0 


 


2/4/19 20:00 97.5 68 18 110/79 (89) 98   


 


2/4/19 15:56 98.2 62 18 96/48 (64) 97   


 


2/4/19 11:55 97.1 64 20 110/67 (81) 97   








Height (Feet):  5


Height (Inches):  4.00


Weight (Pounds):  140


Objective


General: NAD - awake, nonverbal


HEENT: NCAT, MMM, Down syndrome facial features


Respiratory/Chest:  Course B/L


Cardiovascular/Chest:  normal rate, no JVD


Abdomen:  normal bowel sounds, non tender, soft, feeding tube - GT with TF





Microbiology








 Date/Time


Source Procedure


Growth Status


 


 


 2/3/19 15:00


Blood Blood Culture - Preliminary


NO GROWTH AFTER 24 HOURS Resulted


 


 2/3/19 15:00


Blood Blood Culture - Preliminary


NO GROWTH AFTER 24 HOURS Resulted





 2/2/19 14:44


Sputum Gram Stain - Final Resulted


 


 2/2/19 14:44


Sputum Sputum Culture - Preliminary


NORMAL UPPER RESPIRATORY CLARENCE AT 48 ... Resulted











Current Medications








 Medications


  (Trade)  Dose


 Ordered  Sig/Didi


 Route


 PRN Reason  Start Time


 Stop Time Status Last Admin


Dose Admin


 


 Acetaminophen


  (Tylenol)  650 mg  Q4H  PRN


 GT


 FEVER (temp>100.5F)  2/1/19 21:15


 3/3/19 20:59   


 


 


 Albuterol/


 Ipratropium


  (Albuterol/


 Ipratropium)  3 ml  Q4H  PRN


 HHN


 Shortness of Breath  2/1/19 21:00


 2/6/19 20:59   


 


 


 Cefepime HCl 2 gm/


 Dextrose  110 ml @ 


 220 mls/hr  EVERY 12  HOURS


 IV


   2/1/19 23:00


 2/8/19 22:59  2/5/19 09:26


 


 


 Divalproex Sodium


  (Depakote


 Sprinkles)  500 mg  DAILY


 GT


   2/2/19 09:00


 3/4/19 08:59  2/5/19 09:24


 


 


 Heparin Sodium


  (Porcine)


  (Heparin 5000


 units/ml)  5,000 units  EVERY 12  HOURS


 SUBQ


   2/1/19 21:00


 3/3/19 20:59  2/5/19 09:31


 


 


 Levetiracetam


  (Keppra)  750 mg  Q12HR


 GT


   2/5/19 09:15


 3/7/19 08:59  2/5/19 09:25


 


 


 Levothyroxine


 Sodium


  (Synthroid)  75 mcg  DAILY


 GT


   2/2/19 09:00


 3/4/19 08:59  2/5/19 09:24


 


 


 Lorazepam


  (Ativan 2mg/ml


 1ml)  2 mg  Q2H  PRN


 IV


 For Anxiety  2/1/19 21:00


 2/8/19 20:59   


 


 


 Morphine Sulfate


  (Morphine


 Sulfate)  4 mg  Q4H  PRN


 IVP


 Severe Pain (Pain Scale 7-10)  2/1/19 21:00


 2/8/19 20:59   


 


 


 Ondansetron HCl


  (Zofran)  4 mg  Q6H  PRN


 IVP


 Nausea & Vomiting  2/1/19 21:00


 3/3/19 20:59   


 


 


 Polyethylene


 Glycol


  (Miralax)  17 gm  DAILYPRN  PRN


 GT


 Constipation  2/1/19 21:15


 3/3/19 20:59   


 


 


 Sodium Chloride  1,000 ml @ 


 50 mls/hr  Q20H


 IV


   2/1/19 21:00


 3/3/19 20:59  2/4/19 08:10


 


 


 Vancomycin HCl


  (Vanco rx to


 dose)  1 ea  DAILY  PRN


 MISC


 PER RX PROTOCOL  2/1/19 21:15


 3/3/19 21:14   


 


 


 Vancomycin HCl 1


 gm/Dextrose  275 ml @ 


 183.708


 mls/hr  Q12H


 IVPB


   2/4/19 23:00


 2/9/19 22:59  2/4/19 23:35


 

















Elfego Mcmahon MD Feb 5, 2019 10:09

## 2019-02-05 NOTE — CONSULTATION
History of Present Illness


General


Date patient seen:  Feb 2, 2019


Chief Complaint:  Upper Respiratory Illness





Present Illness


HPI


58 years old female with past medical  history of seizure disorder, 

hypothyroidism, G tube, Down syndrome,  came to the emergency department for 

evaluation of cough and congestion,  lasting  for several days. the pt has 

episodes of agitation and is confused. waxing and waning of consciousness.


Allergies:  


Coded Allergies:  


     No Known Allergies (Unverified , 1/8/19)





Medication History


Scheduled


Ascorbic Acid* (Vitamin C*), 500 MG GT DAILY, (Reported)


Docusate Sodium* (Docusate Sodium*), 100 MG GT TWICE A DAY, (Reported)


Levetiracetam (Keppra), 750 MG GT TWICE A DAY, (Reported)


Levothyroxine Sodium* (Levothyroxine Sodium*), 75 MCG GT DAILY, (Reported)


Levothyroxine Sodium* (Levothyroxine Sodium*), 125 MCG ORAL DAILY@0630


Levothyroxine Sodium* (Levothyroxine Sodium*), 125 MCG ORAL DAILY, (Reported)


Lorazepam* (Ativan*), 0.5 MG ORAL THREE TIMES A DAY, (Reported)


Lorazepam* (Ativan*), 1 MG ORAL BEDTIME, (Reported)


Magnesium Hydroxide* (Milk Of Magnesia*), 30 ML ORAL DAILY, (Reported)


Magnesium Hydroxide* (Milk Of Magnesia*), 30 ML ORAL DAILY, (Reported)


Multivitamin With Minerals (Multivitamins With Minerals*), 1 TAB ORAL DAILY, (

Reported)


Polyethylene Glycol 3350* (Miralax*), 17 GM ORAL DAILY, (Reported)


Vitamin D (Vitamin D3), 100 UNITS GT DAILY, (Reported)





Scheduled PRN


Acetaminophen* (Acetaminophen 325MG Tablet*), 650 MG GT Q4H PRN for Mild Pain (

Pain Scale 1-3), (Reported)


Guaifenesin/Codeine Phos* (Robitussin Ac*), 1 TSP ORAL Q4H PRN for For Cough, (

Reported)





Miscellaneous Medications


Acetaminophen (Tylenol), 325 MG PO, (Reported)


Calcium Carbonate/Vitamin D3 (Calcium 500 + D Tablet), 1 EACH PO, (Reported)


Divalproex Sodium (Depakote Sprinkle), 500 MG PO, (Reported)


Levetiracetam (Keppra), 500 MG IV, (Reported)





Patient History


Limited by:  medical condition


History Provided By:  Medical Record, PMD


Healthcare decision maker





Resuscitation status


Full Code


Advanced Directive on File








Past Medical/Surgical History


Past Medical/Surgical History:  


(1) Pneumonia


(2) Trisomy 21, Down syndrome


(3) Seizure disorder


(4) Hypothyroidism





Review of Systems


Psychiatric:  Reports: anxiety, emotional problems





Physical Exam


General Appearance:  alert, confused, agitated





Last 24 Hour Vital Signs








  Date Time  Temp Pulse Resp B/P (MAP) Pulse Ox O2 Delivery O2 Flow Rate FiO2


 


2/5/19 20:00 98.1 71 18 105/55 (72) 98   


 


2/5/19 16:00 97.7 62 18 112/58 (76) 98   


 


2/5/19 12:00 97.2 64 18 116/64 (81) 99   


 


2/5/19 11:24      Nasal Cannula 2.0 28


 


2/5/19 11:24     97 Nasal Cannula 2.0 28


 


2/5/19 11:24  54 18   Nasal Cannula 2.0 28


 


2/5/19 09:00      Nasal Cannula 2.0 


 


2/5/19 08:00 97.2  18 118/66 (83) 98   


 


2/5/19 04:00 98.3 65 20 109/62 (78) 97   


 


2/5/19 00:00 98.0 78 18 152/78 (102) 100   

















Intake and Output  


 


 2/4/19 2/5/19





 18:59 06:59


 


Intake Total 1535.000 ml 1725.000 ml


 


Balance 1535.000 ml 1725.000 ml


 


  


 


Intake Oral 240 ml 


 


Free Water 150 ml 300 ml


 


IV Total 785.000 ml 885.000 ml


 


Tube Feeding 360 ml 540 ml


 


# Voids 1 








Height (Feet):  5


Height (Inches):  4.00


Weight (Pounds):  140


Medications





Current Medications








 Medications


  (Trade)  Dose


 Ordered  Sig/Didi


 Route


 PRN Reason  Start Time


 Stop Time Status Last Admin


Dose Admin


 


 Acetaminophen


  (Tylenol)  650 mg  Q4H  PRN


 GT


 FEVER (temp>100.5F)  2/1/19 21:15


 3/3/19 20:59   


 


 


 Albuterol/


 Ipratropium


  (Albuterol/


 Ipratropium)  3 ml  Q4H  PRN


 HHN


 Shortness of Breath  2/1/19 21:00


 2/6/19 20:59   


 


 


 Cefepime HCl 2 gm/


 Dextrose  110 ml @ 


 220 mls/hr  EVERY 12  HOURS


 IV


   2/1/19 23:00


 2/8/19 22:59  2/5/19 21:12


 


 


 Divalproex Sodium


  (Depakote


 Sprinkles)  500 mg  DAILY


 GT


   2/2/19 09:00


 3/4/19 08:59  2/5/19 09:24


 


 


 Heparin Sodium


  (Porcine)


  (Heparin 5000


 units/ml)  5,000 units  EVERY 12  HOURS


 SUBQ


   2/1/19 21:00


 3/3/19 20:59  2/5/19 21:12


 


 


 Levetiracetam


  (Keppra)  750 mg  Q12HR


 GT


   2/5/19 09:15


 3/7/19 08:59  2/5/19 21:13


 


 


 Levothyroxine


 Sodium


  (Synthroid)  75 mcg  DAILY


 GT


   2/2/19 09:00


 3/4/19 08:59  2/5/19 09:24


 


 


 Lorazepam


  (Ativan 2mg/ml


 1ml)  2 mg  Q2H  PRN


 IV


 For Anxiety  2/1/19 21:00


 2/8/19 20:59   


 


 


 Morphine Sulfate


  (Morphine


 Sulfate)  4 mg  Q4H  PRN


 IVP


 Severe Pain (Pain Scale 7-10)  2/1/19 21:00


 2/8/19 20:59   


 


 


 Ondansetron HCl


  (Zofran)  4 mg  Q6H  PRN


 IVP


 Nausea & Vomiting  2/1/19 21:00


 3/3/19 20:59   


 


 


 Polyethylene


 Glycol


  (Miralax)  17 gm  DAILYPRN  PRN


 GT


 Constipation  2/1/19 21:15


 3/3/19 20:59   


 


 


 Sodium Chloride  1,000 ml @ 


 50 mls/hr  Q20H


 IV


   2/1/19 21:00


 3/3/19 20:59  2/5/19 21:11


 


 


 Vancomycin HCl


  (Vanco rx to


 dose)  1 ea  DAILY  PRN


 MISC


 PER RX PROTOCOL  2/1/19 21:15


 3/3/19 21:14   


 


 


 Vancomycin HCl 1


 gm/Dextrose  275 ml @ 


 183.708


 mls/hr  Q12H


 IVPB


   2/4/19 23:00


 2/9/19 22:59  2/5/19 11:36


 











Assessment/Plan


Problem List:  


(1) Encephalopathy chronic


ICD Codes:  G93.49 - Other encephalopathy


SNOMED:  [id number]


Assessment/Plan


Ativan prn


Depakote would cover seizure and agitation











Mayranne Damon MD Feb 5, 2019 22:05

## 2019-02-05 NOTE — PULMONOLOGY PROGRESS NOTE
Assessment/Plan


Problems:  


(1) Pneumonia


(2) Trisomy 21, Down syndrome


(3) Seizure disorder


(4) Hypothyroidism


Assessment/Plan


BC positive for GPCf/u cultures


continue abx


check electroltyes


might get discharged once we know the cultures


dvt prophylaxis.





dc to nursing home when cultures are available.





Subjective


ROS Limited/Unobtainable:  Yes


Allergies:  


Coded Allergies:  


     No Known Allergies (Unverified , 1/8/19)





Objective





Last 24 Hour Vital Signs








  Date Time  Temp Pulse Resp B/P (MAP) Pulse Ox O2 Delivery O2 Flow Rate FiO2


 


2/5/19 12:00 97.2 64 18 116/64 (81) 99   


 


2/5/19 11:24      Nasal Cannula 2.0 28


 


2/5/19 11:24     97 Nasal Cannula 2.0 28


 


2/5/19 11:24  54 18   Nasal Cannula 2.0 28


 


2/5/19 09:00      Nasal Cannula 2.0 


 


2/5/19 08:00 97.2  18 118/66 (83) 98   


 


2/5/19 04:00 98.3 65 20 109/62 (78) 97   


 


2/5/19 00:00 98.0 78 18 152/78 (102) 100   


 


2/4/19 21:00      Nasal Cannula 2.0 


 


2/4/19 20:00 97.5 68 18 110/79 (89) 98   


 


2/4/19 15:56 98.2 62 18 96/48 (64) 97   

















Intake and Output  


 


 2/4/19 2/5/19





 18:59 06:59


 


Intake Total 1535.000 ml 1725.000 ml


 


Balance 1535.000 ml 1725.000 ml


 


  


 


Intake Oral 240 ml 


 


Free Water 150 ml 300 ml


 


IV Total 785.000 ml 885.000 ml


 


Tube Feeding 360 ml 540 ml


 


# Voids 1 








General Appearance:  WD/WN


HEENT:  normocephalic, atraumatic


Respiratory/Chest:  chest wall non-tender, lungs clear


Cardiovascular:  normal peripheral pulses, regular rhythm


Abdomen:  normal bowel sounds, soft, non tender


Genitourinary:  normal external genitalia


Skin:  no rash





Microbiology








 Date/Time


Source Procedure


Growth Status


 


 


 2/3/19 15:00


Blood Blood Culture - Preliminary


NO GROWTH AFTER 24 HOURS Resulted


 


 2/3/19 15:00


Blood Blood Culture - Preliminary


NO GROWTH AFTER 24 HOURS Resulted





 2/2/19 14:44


Sputum Gram Stain - Final Resulted


 


 2/2/19 14:44


Sputum Sputum Culture - Preliminary


NORMAL UPPER RESPIRATORY CLARENCE AT 48 ... Resulted











Current Medications








 Medications


  (Trade)  Dose


 Ordered  Sig/Didi


 Route


 PRN Reason  Start Time


 Stop Time Status Last Admin


Dose Admin


 


 Acetaminophen


  (Tylenol)  650 mg  Q4H  PRN


 GT


 FEVER (temp>100.5F)  2/1/19 21:15


 3/3/19 20:59   


 


 


 Albuterol/


 Ipratropium


  (Albuterol/


 Ipratropium)  3 ml  Q4H  PRN


 HHN


 Shortness of Breath  2/1/19 21:00


 2/6/19 20:59   


 


 


 Cefepime HCl 2 gm/


 Dextrose  110 ml @ 


 220 mls/hr  EVERY 12  HOURS


 IV


   2/1/19 23:00


 2/8/19 22:59  2/5/19 09:26


 


 


 Divalproex Sodium


  (Depakote


 Sprinkles)  500 mg  DAILY


 GT


   2/2/19 09:00


 3/4/19 08:59  2/5/19 09:24


 


 


 Heparin Sodium


  (Porcine)


  (Heparin 5000


 units/ml)  5,000 units  EVERY 12  HOURS


 SUBQ


   2/1/19 21:00


 3/3/19 20:59  2/5/19 09:31


 


 


 Levetiracetam


  (Keppra)  750 mg  Q12HR


 GT


   2/5/19 09:15


 3/7/19 08:59  2/5/19 09:25


 


 


 Levothyroxine


 Sodium


  (Synthroid)  75 mcg  DAILY


 GT


   2/2/19 09:00


 3/4/19 08:59  2/5/19 09:24


 


 


 Lorazepam


  (Ativan 2mg/ml


 1ml)  2 mg  Q2H  PRN


 IV


 For Anxiety  2/1/19 21:00


 2/8/19 20:59   


 


 


 Morphine Sulfate


  (Morphine


 Sulfate)  4 mg  Q4H  PRN


 IVP


 Severe Pain (Pain Scale 7-10)  2/1/19 21:00


 2/8/19 20:59   


 


 


 Ondansetron HCl


  (Zofran)  4 mg  Q6H  PRN


 IVP


 Nausea & Vomiting  2/1/19 21:00


 3/3/19 20:59   


 


 


 Polyethylene


 Glycol


  (Miralax)  17 gm  DAILYPRN  PRN


 GT


 Constipation  2/1/19 21:15


 3/3/19 20:59   


 


 


 Sodium Chloride  1,000 ml @ 


 50 mls/hr  Q20H


 IV


   2/1/19 21:00


 3/3/19 20:59  2/4/19 08:10


 


 


 Vancomycin HCl


  (Vanco rx to


 dose)  1 ea  DAILY  PRN


 MISC


 PER RX PROTOCOL  2/1/19 21:15


 3/3/19 21:14   


 


 


 Vancomycin HCl 1


 gm/Dextrose  275 ml @ 


 183.708


 mls/hr  Q12H


 IVPB


   2/4/19 23:00


 2/9/19 22:59  2/5/19 11:36


 

















Chano Cherry MD Feb 5, 2019 14:06

## 2019-02-06 VITALS — DIASTOLIC BLOOD PRESSURE: 75 MMHG | SYSTOLIC BLOOD PRESSURE: 135 MMHG

## 2019-02-06 VITALS — DIASTOLIC BLOOD PRESSURE: 68 MMHG | SYSTOLIC BLOOD PRESSURE: 95 MMHG

## 2019-02-06 VITALS — DIASTOLIC BLOOD PRESSURE: 65 MMHG | SYSTOLIC BLOOD PRESSURE: 128 MMHG

## 2019-02-06 VITALS — SYSTOLIC BLOOD PRESSURE: 121 MMHG | DIASTOLIC BLOOD PRESSURE: 71 MMHG

## 2019-02-06 VITALS — SYSTOLIC BLOOD PRESSURE: 106 MMHG | DIASTOLIC BLOOD PRESSURE: 76 MMHG

## 2019-02-06 LAB
ADD MANUAL DIFF: NO
ALBUMIN SERPL-MCNC: 2.6 G/DL (ref 3.4–5)
ALBUMIN/GLOB SERPL: 0.5 {RATIO} (ref 1–2.7)
ALP SERPL-CCNC: 69 U/L (ref 46–116)
ALT SERPL-CCNC: 53 U/L (ref 12–78)
ANION GAP SERPL CALC-SCNC: 4 MMOL/L (ref 5–15)
AST SERPL-CCNC: 39 U/L (ref 15–37)
BASOPHILS NFR BLD AUTO: 1.3 % (ref 0–2)
BILIRUB SERPL-MCNC: 0.4 MG/DL (ref 0.2–1)
BUN SERPL-MCNC: 8 MG/DL (ref 7–18)
CALCIUM SERPL-MCNC: 9.8 MG/DL (ref 8.5–10.1)
CHLORIDE SERPL-SCNC: 104 MMOL/L (ref 98–107)
CO2 SERPL-SCNC: 32 MMOL/L (ref 21–32)
CREAT SERPL-MCNC: 0.4 MG/DL (ref 0.55–1.3)
EOSINOPHIL NFR BLD AUTO: 1.9 % (ref 0–3)
ERYTHROCYTE [DISTWIDTH] IN BLOOD BY AUTOMATED COUNT: 13.8 % (ref 11.6–14.8)
GLOBULIN SER-MCNC: 5.2 G/DL
HCT VFR BLD CALC: 48.4 % (ref 37–47)
HGB BLD-MCNC: 15.8 G/DL (ref 12–16)
LYMPHOCYTES NFR BLD AUTO: 48 % (ref 20–45)
MCV RBC AUTO: 105 FL (ref 80–99)
MONOCYTES NFR BLD AUTO: 9.6 % (ref 1–10)
NEUTROPHILS NFR BLD AUTO: 39.3 % (ref 45–75)
PHOSPHATE SERPL-MCNC: 3.5 MG/DL (ref 2.5–4.9)
PLATELET # BLD: 296 K/UL (ref 150–450)
POTASSIUM SERPL-SCNC: 4.5 MMOL/L (ref 3.5–5.1)
RBC # BLD AUTO: 4.6 M/UL (ref 4.2–5.4)
SODIUM SERPL-SCNC: 140 MMOL/L (ref 136–145)
WBC # BLD AUTO: 6.3 K/UL (ref 4.8–10.8)

## 2019-02-06 RX ADMIN — SODIUM CHLORIDE SCH MLS/HR: 0.9 INJECTION INTRAVENOUS at 09:01

## 2019-02-06 RX ADMIN — DIVALPROEX SODIUM SCH MG: 125 CAPSULE ORAL at 08:59

## 2019-02-06 RX ADMIN — LEVETIRACETAM SCH MG: 100 SOLUTION ORAL at 08:59

## 2019-02-06 RX ADMIN — SODIUM CHLORIDE SCH MLS/HR: 9 INJECTION, SOLUTION INTRAVENOUS at 12:10

## 2019-02-06 RX ADMIN — HEPARIN SODIUM SCH UNITS: 5000 INJECTION INTRAVENOUS; SUBCUTANEOUS at 09:00

## 2019-02-06 NOTE — INFECTIOUS DISEASES PROG NOTE
Assessment/Plan


Assessment/Plan





Abx:


IV Vancomycin 2/1-


Cefepime 2/1-





Assessment:


Probable PNA


  -CXR:  Nonspecific bilateral interstitial prominence can be seen in the 

setting of mild interstitial edema or chronic interstitial lung  disease.  

Hypoventilatory lungs.


  sp cx p





Mild leukocytosis, SP


Afebrile





Gram positive bacteremia- reaL vs contaminant





seizure disorder


 hypothyroidism


s/p PEG


Down syndrome


 SNF resident





Plan:


-Continue empiric IV Vancomycin #6/7 and Cefepime #6/7





- OK to D/C from an ID perspective





-Monitor CBC/CMP, temperatures





-aspiration precautions





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





Thank you for this consultation. Will continue to follow along with you.





Subjective


Allergies:  


Coded Allergies:  


     No Known Allergies (Unverified , 1/8/19)


Subjective


Doing well


Afebrile


No Leukocytosis





Objective


Vital Signs





Last 24 Hour Vital Signs








  Date Time  Temp Pulse Resp B/P (MAP) Pulse Ox O2 Delivery O2 Flow Rate FiO2


 


2/6/19 12:00 97.7 66 18 121/71 (88) 94   


 


2/6/19 09:00      Nasal Cannula 2.0 


 


2/6/19 08:00 98.3 86 19 135/75 (95) 96   


 


2/6/19 04:00 97.4 61 16 106/76 (86) 95   


 


2/6/19 00:00 98.3 67 18 128/65 (86) 96   


 


2/5/19 21:00      Nasal Cannula 2.0 


 


2/5/19 20:00      Nasal Cannula 2.0 28


 


2/5/19 20:00  70 18   Nasal Cannula 2.0 28


 


2/5/19 20:00 98.1 71 18 105/55 (72) 98   


 


2/5/19 20:00     98 Nasal Cannula 2.0 28


 


2/5/19 16:00 97.7 62 18 112/58 (76) 98   








Height (Feet):  5


Height (Inches):  4.00


Weight (Pounds):  129


Objective


General:  nonverbal


HEENT: NCAT, MMM, Down syndrome facial features


Respiratory/Chest:  Course B/L


Cardiovascular/Chest:  normal rate, no JVD


Abdomen:  normal bowel sounds, non tender, soft, feeding tube - GT with TF





Microbiology








 Date/Time


Source Procedure


Growth Status


 


 


 2/3/19 15:00


Blood Blood Culture - Preliminary


NO GROWTH AFTER 48 HOURS Resulted


 


 2/3/19 15:00


Blood Blood Culture - Preliminary


NO GROWTH AFTER 48 HOURS Resulted











Laboratory Tests








Test


  2/6/19


05:40 2/6/19


10:10


 


White Blood Count


  6.3 K/UL


(4.8-10.8) 


 


 


Red Blood Count


  4.60 M/UL


(4.20-5.40) 


 


 


Hemoglobin


  15.8 G/DL


(12.0-16.0) 


 


 


Hematocrit


  48.4 %


(37.0-47.0)  H 


 


 


Mean Corpuscular Volume


  105 FL (80-99)


H 


 


 


Mean Corpuscular Hemoglobin


  34.3 PG


(27.0-31.0)  H 


 


 


Mean Corpuscular Hemoglobin


Concent 32.6 G/DL


(32.0-36.0) 


 


 


Red Cell Distribution Width


  13.8 %


(11.6-14.8) 


 


 


Platelet Count


  296 K/UL


(150-450) 


 


 


Mean Platelet Volume


  6.8 FL


(6.5-10.1) 


 


 


Neutrophils (%) (Auto)


  39.3 %


(45.0-75.0)  L 


 


 


Lymphocytes (%) (Auto)


  48.0 %


(20.0-45.0)  H 


 


 


Monocytes (%) (Auto)


  9.6 %


(1.0-10.0) 


 


 


Eosinophils (%) (Auto)


  1.9 %


(0.0-3.0) 


 


 


Basophils (%) (Auto)


  1.3 %


(0.0-2.0) 


 


 


Sodium Level


  140 MMOL/L


(136-145) 


 


 


Potassium Level


  4.5 MMOL/L


(3.5-5.1) 


 


 


Chloride Level


  104 MMOL/L


() 


 


 


Carbon Dioxide Level


  32 MMOL/L


(21-32) 


 


 


Anion Gap


  4 mmol/L


(5-15)  L 


 


 


Blood Urea Nitrogen


  8 mg/dL (7-18)


  


 


 


Creatinine


  0.4 MG/DL


(0.55-1.30)  L 


 


 


Estimat Glomerular Filtration


Rate > 60 mL/min


(>60) 


 


 


Glucose Level


  89 MG/DL


() 


 


 


Calcium Level


  9.8 MG/DL


(8.5-10.1) 


 


 


Phosphorus Level


  3.5 MG/DL


(2.5-4.9) 


 


 


Magnesium Level


  2.2 MG/DL


(1.8-2.4) 


 


 


Total Bilirubin


  0.4 MG/DL


(0.2-1.0) 


 


 


Aspartate Amino Transf


(AST/SGOT) 39 U/L (15-37)


H 


 


 


Alanine Aminotransferase


(ALT/SGPT) 53 U/L (12-78)


  


 


 


Alkaline Phosphatase


  69 U/L


() 


 


 


Total Protein


  7.8 G/DL


(6.4-8.2) 


 


 


Albumin


  2.6 G/DL


(3.4-5.0)  L 


 


 


Globulin 5.2 g/dL   


 


Albumin/Globulin Ratio


  0.5 (1.0-2.7)


L 


 


 


Vancomycin Level Trough


  


  17.1 ug/mL


(5.0-12.0)  H











Current Medications








 Medications


  (Trade)  Dose


 Ordered  Sig/Didi


 Route


 PRN Reason  Start Time


 Stop Time Status Last Admin


Dose Admin


 


 Acetaminophen


  (Tylenol)  650 mg  Q4H  PRN


 GT


 FEVER (temp>100.5F)  2/1/19 21:15


 3/3/19 20:59   


 


 


 Albuterol/


 Ipratropium


  (Albuterol/


 Ipratropium)  3 ml  Q4H  PRN


 HHN


 Shortness of Breath  2/1/19 21:00


 2/6/19 20:59   


 


 


 Cefepime HCl 2 gm/


 Dextrose  110 ml @ 


 220 mls/hr  EVERY 12  HOURS


 IV


   2/1/19 23:00


 2/8/19 22:59  2/6/19 09:01


 


 


 Divalproex Sodium


  (Depakote


 Sprinkles)  500 mg  DAILY


 GT


   2/2/19 09:00


 3/4/19 08:59  2/6/19 08:59


 


 


 Heparin Sodium


  (Porcine)


  (Heparin 5000


 units/ml)  5,000 units  EVERY 12  HOURS


 SUBQ


   2/1/19 21:00


 3/3/19 20:59  2/6/19 09:00


 


 


 Levetiracetam


  (Keppra)  750 mg  Q12HR


 GT


   2/5/19 09:15


 3/7/19 08:59  2/6/19 08:59


 


 


 Levothyroxine


 Sodium


  (Synthroid)  75 mcg  DAILY


 GT


   2/2/19 09:00


 3/4/19 08:59  2/6/19 08:59


 


 


 Lorazepam


  (Ativan 2mg/ml


 1ml)  2 mg  Q2H  PRN


 IV


 For Anxiety  2/1/19 21:00


 2/8/19 20:59   


 


 


 Morphine Sulfate


  (Morphine


 Sulfate)  4 mg  Q4H  PRN


 IVP


 Severe Pain (Pain Scale 7-10)  2/1/19 21:00


 2/8/19 20:59   


 


 


 Ondansetron HCl


  (Zofran)  4 mg  Q6H  PRN


 IVP


 Nausea & Vomiting  2/1/19 21:00


 3/3/19 20:59   


 


 


 Polyethylene


 Glycol


  (Miralax)  17 gm  DAILYPRN  PRN


 GT


 Constipation  2/1/19 21:15


 3/3/19 20:59   


 


 


 Sodium Chloride  1,000 ml @ 


 50 mls/hr  Q20H


 IV


   2/1/19 21:00


 3/3/19 20:59  2/5/19 21:11


 


 


 Vancomycin HCl


  (Vanco rx to


 dose)  1 ea  DAILY  PRN


 MISC


 PER RX PROTOCOL  2/1/19 21:15


 3/3/19 21:14   


 


 


 Vancomycin HCl 1


 gm/Dextrose  275 ml @ 


 183.708


 mls/hr  Q12H


 IVPB


   2/4/19 23:00


 2/9/19 22:59  2/6/19 12:10


 

















Elfego Mcmahon MD Feb 6, 2019 12:44

## 2019-02-06 NOTE — PULMONOLOGY PROGRESS NOTE
Assessment/Plan


Problems:  


(1) Pneumonia


(2) Trisomy 21, Down syndrome


(3) Seizure disorder


(4) Hypothyroidism


Assessment/Plan


BC positive for GPCf/u cultures, contaminated


afebrile, no WBC


check electroltyes


might get discharged once we know the cultures


dvt prophylaxis.





Subjective


ROS Limited/Unobtainable:  No


Constitutional:  Reports: no symptoms


HEENT:  Repors: no symptoms


Respiratory:  Reports: no symptoms


Allergies:  


Coded Allergies:  


     No Known Allergies (Unverified , 1/8/19)





Objective





Last 24 Hour Vital Signs








  Date Time  Temp Pulse Resp B/P (MAP) Pulse Ox O2 Delivery O2 Flow Rate FiO2


 


2/6/19 12:00 97.7 66 18 121/71 (88) 94   


 


2/6/19 11:40      Room Air  21


 


2/6/19 11:40     97 Room Air  21


 


2/6/19 11:40  67 15   Nasal Cannula 2.0 28


 


2/6/19 09:00      Nasal Cannula 2.0 


 


2/6/19 08:00 98.3 86 19 135/75 (95) 96   


 


2/6/19 04:00 97.4 61 16 106/76 (86) 95   


 


2/6/19 00:00 98.3 67 18 128/65 (86) 96   


 


2/5/19 21:00      Nasal Cannula 2.0 


 


2/5/19 20:00      Nasal Cannula 2.0 28


 


2/5/19 20:00  70 18   Nasal Cannula 2.0 28


 


2/5/19 20:00 98.1 71 18 105/55 (72) 98   


 


2/5/19 20:00     98 Nasal Cannula 2.0 28


 


2/5/19 16:00 97.7 62 18 112/58 (76) 98   

















Intake and Output  


 


 2/5/19 2/6/19





 19:00 07:00


 


Intake Total 672.416 ml 1530.000 ml


 


Balance 672.416 ml 1530.000 ml


 


  


 


Free Water  300 ml


 


IV Total 627.416 ml 735.000 ml


 


Tube Feeding 45 ml 495 ml


 


# Voids 1 








General Appearance:  WD/WN


HEENT:  normocephalic, atraumatic


Respiratory/Chest:  chest wall non-tender, lungs clear


Abdomen:  no mass


Extremities:  no cyanosis, no clubbing


Skin:  no rash





Microbiology








 Date/Time


Source Procedure


Growth Status


 


 


 2/3/19 15:00


Blood Blood Culture - Preliminary


NO GROWTH AFTER 48 HOURS Resulted


 


 2/3/19 15:00


Blood Blood Culture - Preliminary


NO GROWTH AFTER 48 HOURS Resulted








Laboratory Tests


2/6/19 05:40: 


White Blood Count 6.3, Red Blood Count 4.60, Hemoglobin 15.8, Hematocrit 48.4H, 

Mean Corpuscular Volume 105H, Mean Corpuscular Hemoglobin 34.3H, Mean 

Corpuscular Hemoglobin Concent 32.6, Red Cell Distribution Width 13.8, Platelet 

Count 296, Mean Platelet Volume 6.8, Neutrophils (%) (Auto) 39.3L, Lymphocytes (

%) (Auto) 48.0H, Monocytes (%) (Auto) 9.6, Eosinophils (%) (Auto) 1.9, 

Basophils (%) (Auto) 1.3, Sodium Level 140, Potassium Level 4.5, Chloride Level 

104, Carbon Dioxide Level 32, Anion Gap 4L, Blood Urea Nitrogen 8, Creatinine 

0.4L, Estimat Glomerular Filtration Rate > 60, Glucose Level 89, Calcium Level 

9.8, Phosphorus Level 3.5, Magnesium Level 2.2, Total Bilirubin 0.4, Aspartate 

Amino Transf (AST/SGOT) 39H, Alanine Aminotransferase (ALT/SGPT) 53, Alkaline 

Phosphatase 69, Total Protein 7.8, Albumin 2.6L, Globulin 5.2, Albumin/Globulin 

Ratio 0.5L


2/6/19 10:10: Vancomycin Level Trough 17.1H





Current Medications








 Medications


  (Trade)  Dose


 Ordered  Sig/Didi


 Route


 PRN Reason  Start Time


 Stop Time Status Last Admin


Dose Admin


 


 Acetaminophen


  (Tylenol)  650 mg  Q4H  PRN


 GT


 FEVER (temp>100.5F)  2/1/19 21:15


 3/3/19 20:59   


 


 


 Albuterol/


 Ipratropium


  (Albuterol/


 Ipratropium)  3 ml  Q4H  PRN


 HHN


 Shortness of Breath  2/1/19 21:00


 2/6/19 20:59   


 


 


 Cefepime HCl 2 gm/


 Dextrose  110 ml @ 


 220 mls/hr  EVERY 12  HOURS


 IV


   2/1/19 23:00


 2/8/19 22:59  2/6/19 09:01


 


 


 Divalproex Sodium


  (Depakote


 Sprinkles)  500 mg  DAILY


 GT


   2/2/19 09:00


 3/4/19 08:59  2/6/19 08:59


 


 


 Heparin Sodium


  (Porcine)


  (Heparin 5000


 units/ml)  5,000 units  EVERY 12  HOURS


 SUBQ


   2/1/19 21:00


 3/3/19 20:59  2/6/19 09:00


 


 


 Levetiracetam


  (Keppra)  750 mg  Q12HR


 GT


   2/5/19 09:15


 3/7/19 08:59  2/6/19 08:59


 


 


 Levothyroxine


 Sodium


  (Synthroid)  75 mcg  DAILY


 GT


   2/2/19 09:00


 3/4/19 08:59  2/6/19 08:59


 


 


 Lorazepam


  (Ativan 2mg/ml


 1ml)  2 mg  Q2H  PRN


 IV


 For Anxiety  2/1/19 21:00


 2/8/19 20:59   


 


 


 Morphine Sulfate


  (Morphine


 Sulfate)  4 mg  Q4H  PRN


 IVP


 Severe Pain (Pain Scale 7-10)  2/1/19 21:00


 2/8/19 20:59   


 


 


 Ondansetron HCl


  (Zofran)  4 mg  Q6H  PRN


 IVP


 Nausea & Vomiting  2/1/19 21:00


 3/3/19 20:59   


 


 


 Polyethylene


 Glycol


  (Miralax)  17 gm  DAILYPRN  PRN


 GT


 Constipation  2/1/19 21:15


 3/3/19 20:59   


 


 


 Sodium Chloride  1,000 ml @ 


 50 mls/hr  Q20H


 IV


   2/1/19 21:00


 3/3/19 20:59  2/5/19 21:11


 


 


 Vancomycin HCl


  (Vanco rx to


 dose)  1 ea  DAILY  PRN


 MISC


 PER RX PROTOCOL  2/1/19 21:15


 3/3/19 21:14   


 


 


 Vancomycin HCl 1


 gm/Dextrose  275 ml @ 


 183.708


 mls/hr  Q12H


 IVPB


   2/4/19 23:00


 2/9/19 22:59  2/6/19 12:10


 

















Chano Cherry MD Feb 6, 2019 13:23

## 2019-02-07 NOTE — DISCHARGE SUMMARY
Discharge Summary


Discharge Summary


_


DATE OF ADMISSION: 02/01/2019


DATE OF DISCHARGE: 02/06/2019





DISCHARGED BY: Dr. Chano Cherry





CONSULTANTS: 


Dr. Maryanne Mcmahon





BRIEF HOSPITAL COURSE:


Patient is a 58-year-old female, with past medical history of seizure disorder, 

hypothyroidism, Down syndrome, G-tube, resident of a skilled nursing facility, 

presented to the emergency department for evaluation of cough and congestion 

for several days.  Chest x-ray done at the nursing facility revealed pneumonia.

  Patient was nonverbal at baseline.  Patient was afebrile.  There were no 

signs of distress.  No reported chest pain.





On evaluation at ED, blood pressure was 90/55, pulse rate 79, she was 

saturating 94% on 2 L nasal cannula.  Blood work showed WBC 11.6.  Hemoglobin 

and hematocrit were stable.  Lactic acid was normal.  Troponin was negative.  

Urinalysis was essentially negative.  EKG done showed normal sinus rhythm with 

no acute ischemic changes.  Chest x-ray showed bilateral interstitial 

congestion.  She was started empirically on levofloxacin.  She was given 1 dose 

of Solu-Medrol.  She was then admitted to [facility] for evaluation of acute 

bronchitis, possible pneumonia.





She was given respiratory care.  She was placed on strict aspiration and reflux 

precautions.  She was placed on nebulizer treatment.  She was continued on 

Keppra and Depakote.  She was placed on seizure precautions.





TSH was slightly low.  She was continued on levothyroxine 75 mcg daily.  

Recommend repeat TSH in 4 weeks.





ID was consulted.  Blood culture showed growth of staph hominis.  She was 

started empirically IV vancomycin and cefepime.  Repeat blood culture was done.

  Sputum culture showed growth of Streptococcus and usual respiratory lilliam.





Patient had episodes of agitation and confusion.  She had waxing and waning of 

consciousness.  She was diagnosed with encephalopathy.  She was continued on 

Depakote for seizure and agitation.





Leukocytosis resolved.  Patient was afebrile.  Repeat blood culture did not 

isolate any growth.  She was eventually cleared for discharge.  To continue 

antibiotics at the nursing home.





FINAL DIAGNOSES: 


Pneumonia


Trisomy 21, Down syndrome


Seizure disorder


Hypothyroidism


Gram-positive bacteremia-real versus contaminant


Nursing home resident


Encephalopathy


High aspiration risk


Dysphagia on G-tube


Protein calorie malnutrition


Acute bronchitis





DISPOSITION: Patient was discharged to [facility].





DISCHARGE MEDICATIONS: Refer to Discharge Medication List.








I have been assigned to complete a discharge summary on this account, I was not 

involved with the patient's management.











Essie Toledo NP Feb 7, 2019 10:30

## 2019-02-07 NOTE — GENERAL PROGRESS NOTE
Assessment/Plan


Problem List:  


(1) Encephalopathy chronic


ICD Codes:  G93.49 - Other encephalopathy


SNOMED:  92541570


Assessment/Plan


Ativan prn


Depakote would cover seizure and agitation





Subjective


Date patient seen:  Feb 6, 2019


Neurologic/Psychiatric:  Reports: anxiety, depressed


Allergies:  


Coded Allergies:  


     No Known Allergies (Unverified , 1/8/19)





Objective





Last 24 Hour Vital Signs








  Date Time  Temp Pulse Resp B/P (MAP) Pulse Ox O2 Delivery O2 Flow Rate FiO2


 


2/6/19 15:55 97.4 82 20 95/68 (77) 97   

















Intake and Output  


 


 2/6/19 2/7/19





 19:00 07:00


 


  


 


# Voids 3 








Height (Feet):  5


Height (Inches):  4.00


Weight (Pounds):  129


General Appearance:  alert, confused, agitated











Maryanne Damon MD Feb 7, 2019 14:00

## 2020-08-22 ENCOUNTER — HOSPITAL ENCOUNTER (INPATIENT)
Dept: HOSPITAL 72 - EMR | Age: 60
LOS: 55 days | Discharge: NURSING, REHAB OR LONG-TERM CARE | DRG: 4 | End: 2020-10-16
Payer: MEDICARE

## 2020-08-22 VITALS — SYSTOLIC BLOOD PRESSURE: 96 MMHG | DIASTOLIC BLOOD PRESSURE: 59 MMHG

## 2020-08-22 VITALS — BODY MASS INDEX: 29.88 KG/M2 | WEIGHT: 168.65 LBS | HEIGHT: 63 IN

## 2020-08-22 VITALS — DIASTOLIC BLOOD PRESSURE: 70 MMHG | SYSTOLIC BLOOD PRESSURE: 108 MMHG

## 2020-08-22 DIAGNOSIS — Z74.01: ICD-10-CM

## 2020-08-22 DIAGNOSIS — I24.8: ICD-10-CM

## 2020-08-22 DIAGNOSIS — D69.6: ICD-10-CM

## 2020-08-22 DIAGNOSIS — J80: ICD-10-CM

## 2020-08-22 DIAGNOSIS — Z99.11: ICD-10-CM

## 2020-08-22 DIAGNOSIS — R13.10: ICD-10-CM

## 2020-08-22 DIAGNOSIS — R00.1: ICD-10-CM

## 2020-08-22 DIAGNOSIS — N17.0: ICD-10-CM

## 2020-08-22 DIAGNOSIS — E03.9: ICD-10-CM

## 2020-08-22 DIAGNOSIS — A41.9: Primary | ICD-10-CM

## 2020-08-22 DIAGNOSIS — I46.9: ICD-10-CM

## 2020-08-22 DIAGNOSIS — R71.0: ICD-10-CM

## 2020-08-22 DIAGNOSIS — R65.21: ICD-10-CM

## 2020-08-22 DIAGNOSIS — Z93.1: ICD-10-CM

## 2020-08-22 DIAGNOSIS — E87.0: ICD-10-CM

## 2020-08-22 DIAGNOSIS — R19.7: ICD-10-CM

## 2020-08-22 DIAGNOSIS — J18.9: ICD-10-CM

## 2020-08-22 DIAGNOSIS — G93.49: ICD-10-CM

## 2020-08-22 DIAGNOSIS — J93.0: ICD-10-CM

## 2020-08-22 DIAGNOSIS — Z20.828: ICD-10-CM

## 2020-08-22 DIAGNOSIS — G40.909: ICD-10-CM

## 2020-08-22 DIAGNOSIS — E87.6: ICD-10-CM

## 2020-08-22 DIAGNOSIS — B35.3: ICD-10-CM

## 2020-08-22 DIAGNOSIS — Q90.9: ICD-10-CM

## 2020-08-22 LAB
ADD MANUAL DIFF: NO
ALBUMIN SERPL-MCNC: 2.3 G/DL (ref 3.4–5)
ALBUMIN/GLOB SERPL: 0.5 {RATIO} (ref 1–2.7)
ALP SERPL-CCNC: 63 U/L (ref 46–116)
ALT SERPL-CCNC: 24 U/L (ref 12–78)
ANION GAP SERPL CALC-SCNC: 6 MMOL/L (ref 5–15)
APPEARANCE UR: CLEAR
APTT BLD: 27 SEC (ref 23–33)
APTT PPP: (no result) S
AST SERPL-CCNC: 20 U/L (ref 15–37)
BASOPHILS NFR BLD AUTO: 1.4 % (ref 0–2)
BILIRUB SERPL-MCNC: 0.3 MG/DL (ref 0.2–1)
BUN SERPL-MCNC: 11 MG/DL (ref 7–18)
CALCIUM SERPL-MCNC: 8.6 MG/DL (ref 8.5–10.1)
CHLORIDE SERPL-SCNC: 97 MMOL/L (ref 98–107)
CK MB SERPL-MCNC: < 0.5 NG/ML (ref 0–3.6)
CK SERPL-CCNC: 31 U/L (ref 26–308)
CO2 SERPL-SCNC: 28 MMOL/L (ref 21–32)
CREAT SERPL-MCNC: 0.5 MG/DL (ref 0.55–1.3)
EOSINOPHIL NFR BLD AUTO: 0.3 % (ref 0–3)
ERYTHROCYTE [DISTWIDTH] IN BLOOD BY AUTOMATED COUNT: 12.2 % (ref 11.6–14.8)
GLOBULIN SER-MCNC: 4.8 G/DL
GLUCOSE UR STRIP-MCNC: NEGATIVE MG/DL
HCT VFR BLD CALC: 44.3 % (ref 37–47)
HGB BLD-MCNC: 14.7 G/DL (ref 12–16)
INR PPP: 1 (ref 0.9–1.1)
KETONES UR QL STRIP: NEGATIVE
LEUKOCYTE ESTERASE UR QL STRIP: (no result)
LYMPHOCYTES NFR BLD AUTO: 34.1 % (ref 20–45)
MCV RBC AUTO: 104 FL (ref 80–99)
MONOCYTES NFR BLD AUTO: 14 % (ref 1–10)
NEUTROPHILS NFR BLD AUTO: 50.3 % (ref 45–75)
NITRITE UR QL STRIP: NEGATIVE
PH UR STRIP: 6 [PH] (ref 4.5–8)
PHOSPHATE SERPL-MCNC: 3.3 MG/DL (ref 2.5–4.9)
PLATELET # BLD: 250 K/UL (ref 150–450)
POTASSIUM SERPL-SCNC: 3.8 MMOL/L (ref 3.5–5.1)
PROT UR QL STRIP: (no result)
RBC # BLD AUTO: 4.28 M/UL (ref 4.2–5.4)
SODIUM SERPL-SCNC: 131 MMOL/L (ref 136–145)
SP GR UR STRIP: 1.01 (ref 1–1.03)
UROBILINOGEN UR-MCNC: NORMAL MG/DL (ref 0–1)
WBC # BLD AUTO: 7.8 K/UL (ref 4.8–10.8)

## 2020-08-22 PROCEDURE — 84100 ASSAY OF PHOSPHORUS: CPT

## 2020-08-22 PROCEDURE — 85025 COMPLETE CBC W/AUTO DIFF WBC: CPT

## 2020-08-22 PROCEDURE — 36600 WITHDRAWAL OF ARTERIAL BLOOD: CPT

## 2020-08-22 PROCEDURE — 86738 MYCOPLASMA ANTIBODY: CPT

## 2020-08-22 PROCEDURE — 82803 BLOOD GASES ANY COMBINATION: CPT

## 2020-08-22 PROCEDURE — 36415 COLL VENOUS BLD VENIPUNCTURE: CPT

## 2020-08-22 PROCEDURE — 83036 HEMOGLOBIN GLYCOSYLATED A1C: CPT

## 2020-08-22 PROCEDURE — 81003 URINALYSIS AUTO W/O SCOPE: CPT

## 2020-08-22 PROCEDURE — 80150 ASSAY OF AMIKACIN: CPT

## 2020-08-22 PROCEDURE — 82746 ASSAY OF FOLIC ACID SERUM: CPT

## 2020-08-22 PROCEDURE — 83550 IRON BINDING TEST: CPT

## 2020-08-22 PROCEDURE — 80069 RENAL FUNCTION PANEL: CPT

## 2020-08-22 PROCEDURE — 76770 US EXAM ABDO BACK WALL COMP: CPT

## 2020-08-22 PROCEDURE — 71045 X-RAY EXAM CHEST 1 VIEW: CPT

## 2020-08-22 PROCEDURE — 87181 SC STD AGAR DILUTION PER AGT: CPT

## 2020-08-22 PROCEDURE — 82553 CREATINE MB FRACTION: CPT

## 2020-08-22 PROCEDURE — 86713 LEGIONELLA ANTIBODY: CPT

## 2020-08-22 PROCEDURE — 84484 ASSAY OF TROPONIN QUANT: CPT

## 2020-08-22 PROCEDURE — 86612 BLASTOMYCES ANTIBODY: CPT

## 2020-08-22 PROCEDURE — 96361 HYDRATE IV INFUSION ADD-ON: CPT

## 2020-08-22 PROCEDURE — 86920 COMPATIBILITY TEST SPIN: CPT

## 2020-08-22 PROCEDURE — 80061 LIPID PANEL: CPT

## 2020-08-22 PROCEDURE — 87205 SMEAR GRAM STAIN: CPT

## 2020-08-22 PROCEDURE — 84439 ASSAY OF FREE THYROXINE: CPT

## 2020-08-22 PROCEDURE — 94150 VITAL CAPACITY TEST: CPT

## 2020-08-22 PROCEDURE — 85610 PROTHROMBIN TIME: CPT

## 2020-08-22 PROCEDURE — 94003 VENT MGMT INPAT SUBQ DAY: CPT

## 2020-08-22 PROCEDURE — 82728 ASSAY OF FERRITIN: CPT

## 2020-08-22 PROCEDURE — 94664 DEMO&/EVAL PT USE INHALER: CPT

## 2020-08-22 PROCEDURE — 83540 ASSAY OF IRON: CPT

## 2020-08-22 PROCEDURE — 85060 BLOOD SMEAR INTERPRETATION: CPT

## 2020-08-22 PROCEDURE — 80164 ASSAY DIPROPYLACETIC ACD TOT: CPT

## 2020-08-22 PROCEDURE — 93306 TTE W/DOPPLER COMPLETE: CPT

## 2020-08-22 PROCEDURE — 82607 VITAMIN B-12: CPT

## 2020-08-22 PROCEDURE — 36569 INSJ PICC 5 YR+ W/O IMAGING: CPT

## 2020-08-22 PROCEDURE — 86021 WBC ANTIBODY IDENTIFICATION: CPT

## 2020-08-22 PROCEDURE — 82270 OCCULT BLOOD FECES: CPT

## 2020-08-22 PROCEDURE — 86710 INFLUENZA VIRUS ANTIBODY: CPT

## 2020-08-22 PROCEDURE — 86171 COMPLEMENT FIXATION EACH: CPT

## 2020-08-22 PROCEDURE — 87324 CLOSTRIDIUM AG IA: CPT

## 2020-08-22 PROCEDURE — 80048 BASIC METABOLIC PNL TOTAL CA: CPT

## 2020-08-22 PROCEDURE — 85651 RBC SED RATE NONAUTOMATED: CPT

## 2020-08-22 PROCEDURE — 82164 ANGIOTENSIN I ENZYME TEST: CPT

## 2020-08-22 PROCEDURE — 87449 NOS EACH ORGANISM AG IA: CPT

## 2020-08-22 PROCEDURE — 82977 ASSAY OF GGT: CPT

## 2020-08-22 PROCEDURE — 83735 ASSAY OF MAGNESIUM: CPT

## 2020-08-22 PROCEDURE — 82962 GLUCOSE BLOOD TEST: CPT

## 2020-08-22 PROCEDURE — 94002 VENT MGMT INPAT INIT DAY: CPT

## 2020-08-22 PROCEDURE — 93005 ELECTROCARDIOGRAM TRACING: CPT

## 2020-08-22 PROCEDURE — 80202 ASSAY OF VANCOMYCIN: CPT

## 2020-08-22 PROCEDURE — 96367 TX/PROPH/DG ADDL SEQ IV INF: CPT

## 2020-08-22 PROCEDURE — 87040 BLOOD CULTURE FOR BACTERIA: CPT

## 2020-08-22 PROCEDURE — 83605 ASSAY OF LACTIC ACID: CPT

## 2020-08-22 PROCEDURE — 87385 HISTOPLASMA CAPSUL AG IA: CPT

## 2020-08-22 PROCEDURE — 76937 US GUIDE VASCULAR ACCESS: CPT

## 2020-08-22 PROCEDURE — 87081 CULTURE SCREEN ONLY: CPT

## 2020-08-22 PROCEDURE — 85007 BL SMEAR W/DIFF WBC COUNT: CPT

## 2020-08-22 PROCEDURE — 86803 HEPATITIS C AB TEST: CPT

## 2020-08-22 PROCEDURE — 80076 HEPATIC FUNCTION PANEL: CPT

## 2020-08-22 PROCEDURE — 80053 COMPREHEN METABOLIC PANEL: CPT

## 2020-08-22 PROCEDURE — 86140 C-REACTIVE PROTEIN: CPT

## 2020-08-22 PROCEDURE — 84550 ASSAY OF BLOOD/URIC ACID: CPT

## 2020-08-22 PROCEDURE — 94660 CPAP INITIATION&MGMT: CPT

## 2020-08-22 PROCEDURE — 86635 COCCIDIOIDES ANTIBODY: CPT

## 2020-08-22 PROCEDURE — 82550 ASSAY OF CK (CPK): CPT

## 2020-08-22 PROCEDURE — 86900 BLOOD TYPING SEROLOGIC ABO: CPT

## 2020-08-22 PROCEDURE — 83615 LACTATE (LD) (LDH) ENZYME: CPT

## 2020-08-22 PROCEDURE — 85730 THROMBOPLASTIN TIME PARTIAL: CPT

## 2020-08-22 PROCEDURE — 87517 HEPATITIS B DNA QUANT: CPT

## 2020-08-22 PROCEDURE — 86703 HIV-1/HIV-2 1 RESULT ANTBDY: CPT

## 2020-08-22 PROCEDURE — 84300 ASSAY OF URINE SODIUM: CPT

## 2020-08-22 PROCEDURE — 96365 THER/PROPH/DIAG IV INF INIT: CPT

## 2020-08-22 PROCEDURE — 83880 ASSAY OF NATRIURETIC PEPTIDE: CPT

## 2020-08-22 PROCEDURE — 87070 CULTURE OTHR SPECIMN AEROBIC: CPT

## 2020-08-22 PROCEDURE — 99285 EMERGENCY DEPT VISIT HI MDM: CPT

## 2020-08-22 PROCEDURE — 87086 URINE CULTURE/COLONY COUNT: CPT

## 2020-08-22 PROCEDURE — 86039 ANTINUCLEAR ANTIBODIES (ANA): CPT

## 2020-08-22 PROCEDURE — 84443 ASSAY THYROID STIM HORMONE: CPT

## 2020-08-22 PROCEDURE — 86850 RBC ANTIBODY SCREEN: CPT

## 2020-08-22 PROCEDURE — 93970 EXTREMITY STUDY: CPT

## 2020-08-22 PROCEDURE — 94640 AIRWAY INHALATION TREATMENT: CPT

## 2020-08-22 PROCEDURE — 86901 BLOOD TYPING SEROLOGIC RH(D): CPT

## 2020-08-22 RX ADMIN — SODIUM CHLORIDE SCH MLS/HR: 9 INJECTION, SOLUTION INTRAVENOUS at 23:31

## 2020-08-22 RX ADMIN — VALPROIC ACID SCH MG: 250 SOLUTION ORAL at 21:25

## 2020-08-22 RX ADMIN — DEXTROSE MONOHYDRATE SCH MLS/HR: 50 INJECTION, SOLUTION INTRAVENOUS at 21:25

## 2020-08-22 RX ADMIN — HEPARIN SODIUM SCH UNITS: 5000 INJECTION INTRAVENOUS; SUBCUTANEOUS at 21:26

## 2020-08-22 NOTE — NUR
ED Nurse Note:



Patient was brought in by RA 26 from [facility] due to 
respiratory distress started 0830am this morning. Patient reported to desat 
down to 86 %  on room air. Pt came in with NR at 15 LPM sats 94% upon ED 
arrival. Patient has flat affect upon arrival, was placed in Trauma room, 
connected to the monitor,  ER MD at bed side.

## 2020-08-22 NOTE — NUR
NURSE NOTES:

Received report from Finn Heredia RN. patient alert x 1-2, eyes open tracks people, 
afebrile and has no respiratory distress noted. On Venturi mask wt FiO2 35 % saturating well 
at %. With GT on Jevity 1.2 at 30cc/hr intact, no residual and no sediments noted. 
With right hand and left hand 20G IV lines intact, patent and asymptomatic. With purewick 
connected to urine bag intact, no sediments and draining well. HOB elevated. Call light 
within reach. Bed rails are up and padded, wheels are locked. Continue plan of care

## 2020-08-22 NOTE — NUR
NURSE HAND-OFF REPORT: 



Important Events on Shift:

Patient Status: Stable

Diet: Jevity 1.2 30 ml/hr



Pending Orders: None.

Pending Results/Labs:None.

Pending MD notification:None.



Latest Vital Signs: Temperature 97.3 , Pulse 79 , B/P 96 /59 , Respiratory Rate 24 , O2 SAT 
100 , Simple Mask, O2 Flow Rate 6.0 .  

Vital Sign Comment: Stable



EKG Rhythm: Sinus Rhythm

Rhythm change?: N 

MD Notified?: -

MD Response: 



Latest Momin Fall Score: 60  

Fall Risk: High Risk 

Safety Measures: Call light Within Reach, Bed Alarm Zone 2, Side Rails Side Rails x2, Bed 
position Low and Locked.

Fall Precautions: 

Yellow Socks

Yellow Gown

Door Sign

Patient Fall Education



Report given to LAYTON Becerra.

## 2020-08-22 NOTE — NUR
ED Nurse Note:



Patient was admited to SDU unit due to pneumonia. Patient wa transfered to the 
unit via gurney by ACLS protocol. Patient has no bellongings. Patient AAO x0, 
VSS at this time, no skin issue found. Patient was transfered with Vancomycin 
1.5 g drip.

## 2020-08-22 NOTE — EMERGENCY ROOM REPORT
History of Present Illness


General


Chief Complaint:  Dyspnea/Respdistress


Source:  Patient, Medical Record





Present Illness


HPI


Patient is a 59-year-old female presenting from nursing home after decreased 

oxygen saturation beginning earlier in the day.  Prior history of Down 

syndrome.  Patient had been nonverbal at baseline.  Reportedly had diminished 

oxygen since earlier in the day.  No response to albuterol.  Had been increased 

rales per EMS.  She was started on supplemental oxygen via nonrebreather.  

Reportedly had negative coronavirus testing recently.


Allergies:  


Coded Allergies:  


     No Known Allergies (Unverified , 1/8/19)





COVID-19 Screening


Contact w/high risk pt:  No


Experienced COVID-19 symptoms?:  No


COVID-19 Testing performed PTA:  Yes


COVID-19 Screening:  Negative COVID-19


COVID-19 Testing Source:  08/10/20





Patient History


Past Medical History:  see triage record, seizures, other - hypothyroid


Past Surgical History:  other - gtube


Reviewed Nursing Documentation:  PMH: Agreed; PSxH: Agreed





Nursing Documentation-PMH


Past Medical History:  No History, Except For


Hx Cardiac Problems:  No - PVD


Hx Diabetes:  No - Hypothyroid


Hx Cancer:  No


Hx Gastrointestinal Problems:  No - gastrostomy


Hx Neurological Problems:  Yes - down syndrome


Hx Seizures:  Yes


Hx Speech Problem:  Yes





Review of Systems


All Other Systems:  limited - Limited by poor historian





Physical Exam





Vital Signs








  Date Time  Temp Pulse Resp B/P (MAP) Pulse Ox O2 Delivery O2 Flow Rate FiO2


 


8/22/20 11:20 99.0 80 16 108/70 (83) 94 Non-Rebreather 15.0 








Sp02 EP Interpretation:  reviewed, normal


General Appearance:  alert, moderate distress, Chronically Ill


Head:  atraumatic


ENT:  hearing grossly normal, normal voice, other - Large amount of secretions


Neck:  normal inspection, supple, no bony tend, limited range of motion


Respiratory:  normal inspection, no respiratory distress, no retraction, no 

wheezing, rhonchi


Cardiovascular #1:  regular rate, rhythm, no edema


Gastrointestinal:  normal inspection, normal bowel sounds, non tender, soft, no 

guarding, no hernia


Genitourinary:  no CVA tenderness


Musculoskeletal:  normal inspection, back normal, normal range of motion


Neurologic:  alert, responsive, aphasia


Psychiatric:  normal inspection, judgement/insight normal, mood/affect normal


Skin:  no rash





Procedures


Critical Care Time


Critical Care Time


Patient had a critical medical condition which untreated could potentially 

result in life or limb threatening injury.  Total  critical care time excluding 

procedures approximately 45 minutes.





Medical Decision Making


Diagnostic Impression:  


 Primary Impression:  


 Pneumonia


 Additional Impressions:  


 Trisomy 21, Down syndrome


 Seizure disorder


ER Course


Patient presented for shortness of breath.  Differential included but was not 

limited to anemia, pneumonia, pneumothorax, myocardial infarction, pericardial 

effusion, congestive heart failure, acidosis.  Because of complexity of patient'

s case laboratory tests and imaging studies were ordered.Patient had previous 

history of hypothyroidism as well as Down syndrome and seizures.  Patient was 

maintained on supplemental oxygen.patient was initially maintained on a 

nonrebreather.  Patient's arterial blood gas showed no evidence of significant 

AA gradient.  Patient was started on  simple mask.   Chest x-ray showed 

bilateral interstitial infiltrates right greater than left.  Patient was orally 

suctioned with some improvement in respirations.  Patient was admitted for 

further evaluation and treatment. Dr. Cherry was contacted for inpatient 

management due to covering physician for [facility].





Laboratory Tests








Test


  8/23/20


06:30 8/23/20


14:10 8/23/20


17:15 8/23/20


23:10


 


White Blood Count


  7.5 K/UL


(4.8-10.8) 


  


  


 


 


Red Blood Count


  3.84 M/UL


(4.20-5.40)  L 


  


  


 


 


Hemoglobin


  13.3 G/DL


(12.0-16.0) 


  


  


 


 


Hematocrit


  39.8 %


(37.0-47.0) 


  


  


 


 


Mean Corpuscular Volume


  104 FL (80-99)


H 


  


  


 


 


Mean Corpuscular Hemoglobin


  34.5 PG


(27.0-31.0)  H 


  


  


 


 


Mean Corpuscular Hemoglobin


Concent 33.3 G/DL


(32.0-36.0) 


  


  


 


 


Red Cell Distribution Width


  12.0 %


(11.6-14.8) 


  


  


 


 


Platelet Count


  206 K/UL


(150-450) 


  


  


 


 


Mean Platelet Volume


  7.0 FL


(6.5-10.1) 


  


  


 


 


Neutrophils (%) (Auto)


  61.4 %


(45.0-75.0) 


  


  


 


 


Lymphocytes (%) (Auto)


  25.7 %


(20.0-45.0) 


  


  


 


 


Monocytes (%) (Auto)


  10.9 %


(1.0-10.0)  H 


  


  


 


 


Eosinophils (%) (Auto)


  1.1 %


(0.0-3.0) 


  


  


 


 


Basophils (%) (Auto)


  0.9 %


(0.0-2.0) 


  


  


 


 


Sodium Level


  135 MMOL/L


(136-145)  L 


  


  


 


 


Potassium Level


  4.1 MMOL/L


(3.5-5.1) 


  


  


 


 


Chloride Level


  100 MMOL/L


() 


  


  


 


 


Carbon Dioxide Level


  28 MMOL/L


(21-32) 


  


  


 


 


Anion Gap


  7 mmol/L


(5-15) 


  


  


 


 


Blood Urea Nitrogen


  11 mg/dL


(7-18) 


  


  


 


 


Creatinine


  0.5 MG/DL


(0.55-1.30)  L 


  


  


 


 


Estimated Glomerular


Filtration Rate > 60 mL/min


(>60) 


  


  


 


 


Glucose Level


  106 MG/DL


() 


  


  


 


 


Calcium Level


  8.5 MG/DL


(8.5-10.1) 


  


  


 


 


Phosphorus Level


  3.7 MG/DL


(2.5-4.9) 


  


  


 


 


Albumin


  2.1 G/DL


(3.4-5.0)  L 


  


  


 


 


    


 


    


 


    


 


    


 


    


 


    


 


    


 


    


 


    


 


    


 


    


 


    


 


    


 


    


 


    


 


    


 


    


 


    


 


    


 


    


 


    


 


    


 


    


 


    


 


    


 


    


 


    


 


    


 


    


 


    


 


    


 


    


 


    


 


    


 


    


 


    


 


    


 


    


 


    


 


    


 


    


 


  


  


  


 








Microbiology








 Date/Time


Source Procedure


Growth Status


 


 


 8/23/20 14:10


Nasopharynx SARS-CoV-2 RdRp Gene Assay - Final Complete











Last Vital Signs








  Date Time  Temp Pulse Resp B/P (MAP) Pulse Ox O2 Delivery O2 Flow Rate FiO2


 


8/22/20 11:20 99.0 80 16 108/70 (83) 94 Non-Rebreather 15.0 








Status:  unchanged


Disposition:  ADMITTED AS INPATIENT


Condition:  Stable











Sabas Hawk MD Aug 22, 2020 11:30

## 2020-08-22 NOTE — NUR
ED Nurse Note:



Recived repot from LAY[facility]. Patient resting in the bed, with eyes close, 
VSS at this time.

## 2020-08-22 NOTE — DIAGNOSTIC IMAGING REPORT
EXAM:

  XR Chest, 1 View

 

CLINICAL HISTORY:

  SOB

 

TECHNIQUE:

  Frontal view of the chest.

 

COMPARISON:

  No relevant prior studies available.

 

FINDINGS:

  Lungs:  Reduced lung volumes.  Patchy bilateral pulmonary opacities.

  Pleural space:  Unremarkable.  No pneumothorax.

  Heart:  Large cardiomediastinal silhouette.

  Mediastinum:  See above.

  Bones/joints:  No acute fracture.

 

IMPRESSION:     

  Reduced lung volumes.  Patchy bilateral predominantly interstitial 

pulmonary opacities.  Could be from edema and/or pneumonia.  There is a 

broader differential.

## 2020-08-22 NOTE — CONSULTATION
History of Present Illness


General


Date patient seen:  Aug 22, 2020


Chief Complaint:  Dyspnea/Respdistress





Present Illness


HPI


59-year-old female  with hx of trisomy 21, nursing home resident  present ed to 

ER  for  decreased oxygen saturation.  Patient had been nonverbal at baseline.  

Had been increased rales per EMS.  She was started on supplemental oxygen via 

nonrebreather.


Allergies:  


Coded Allergies:  


     No Known Allergies (Unverified , 1/8/19)





Medication History


Scheduled


Acetaminophen* (Acetaminophen 325MG Tablet*), 640 MG GT DAILY, (Reported)


Ascorbic Acid* (Vitamin C*), 500 MG GT DAILY, (Reported)


Ascorbic Acid* (Vitamin C*), 500 MG GT DAILY, (Reported)


Baclofen* (Baclofen*), 10 MG GT Q8HR, (Reported)


Cefepime Hcl/D5w (Cefepime-Dextrose 2 Gm/50 Ml), 2 GM IVPB EVERY 12 HOURS, (

Reported)


Docusate Sodium* (Docusate Sodium*), 100 MG GT TWICE A DAY, (Reported)


Ipratropium/Albuterol Sulfate (DuoNeb 0.5-3(2.5)mg/3ml), 3 ML HHN Q4HR, (

Reported)


Levetiracetam (Keppra), 750 MG GT TWICE A DAY, (Reported)


Levothyroxine Sodium* (Levothyroxine Sodium*), 75 MCG GT DAILY, (Reported)


Levothyroxine Sodium* (Levothyroxine Sodium*), 125 MCG ORAL DAILY@0630


Levothyroxine Sodium* (Levothyroxine Sodium*), 125 MCG ORAL DAILY, (Reported)


Lorazepam* (Ativan*), 0.5 MG ORAL THREE TIMES A DAY, (Reported)


Lorazepam* (Ativan*), 1 MG ORAL BEDTIME, (Reported)


Magnesium Hydroxide* (Milk Of Magnesia*), 30 ML ORAL DAILY, (Reported)


Magnesium Hydroxide* (Milk Of Magnesia*), 30 ML ORAL DAILY, (Reported)


Multivitamin With Minerals (Multivitamins With Minerals*), 1 TAB ORAL DAILY, (

Reported)


Polyethylene Glycol 3350* (Miralax*), 17 GM ORAL DAILY, (Reported)


Vancomycin Hcl (Vancomycin Hcl), 1 GM IV Q12HR, (Reported)


Vitamin D (Vitamin D3), 100 UNITS GT DAILY, (Reported)





Scheduled PRN


Acetaminophen* (Acetaminophen 325MG Tablet*), 650 MG GT Q4H PRN for Mild Pain (

Pain Scale 1-3), (Reported)


Guaifenesin/Codeine Phos* (Robitussin Ac*), 1 TSP ORAL Q4H PRN for For Cough, (

Reported)





Miscellaneous Medications


Acetaminophen (Tylenol), 325 MG PO, (Reported)


Calcium Carbonate/Vitamin D3 (Calcium 500 + D Tablet), 1 EACH PO, (Reported)


Divalproex Sodium (Depakote Sprinkle), 500 MG PO, (Reported)


Levetiracetam (Keppra), 500 MG IV, (Reported)


Valproate Sodium (Valproic Acid), 500 MG GT, (Reported)





Patient History


Healthcare decision maker





Resuscitation status





Advanced Directive on File








Past Medical/Surgical History


Past Medical/Surgical History:  


(1) Trisomy 21, Down syndrome


(2) Seizure disorder


(3) Hypothyroidism





Review of Systems


All Other Systems:  negative except mentioned in HPI





Physical Exam


General Appearance:  thin


Lines, tubes and drains:  peripheral


HEENT:  normocephalic, atraumatic


Neck:  non-tender, normal alignment


Respiratory/Chest:  chest wall non-tender, lungs clear


Breasts:  no masses


Cardiovascular/Chest:  normal peripheral pulses


Abdomen:  normal bowel sounds





Last 24 Hour Vital Signs








  Date Time  Temp Pulse Resp B/P (MAP) Pulse Ox O2 Delivery O2 Flow Rate FiO2


 


8/22/20 15:15 98.8 67 22 99/65 99 Simple Mask 6.0 


 


8/22/20 11:45  80 16   Non-Rebreather 15.0 


 


8/22/20 11:45 99.0 87 16 108/70 94 Non-Rebreather 15.0 


 


8/22/20 11:20 99.0 80 16 108/70 (83) 94 Non-Rebreather 15.0 











Laboratory Tests








Test


  8/22/20


11:40 8/22/20


11:50 8/22/20


12:18 8/22/20


13:20


 


White Blood Count


  7.8 K/UL


(4.8-10.8) 


  


  


 


 


Red Blood Count


  4.28 M/UL


(4.20-5.40) 


  


  


 


 


Hemoglobin


  14.7 G/DL


(12.0-16.0) 


  


  


 


 


Hematocrit


  44.3 %


(37.0-47.0) 


  


  


 


 


Mean Corpuscular Volume


  104 FL (80-99)


H 


  


  


 


 


Mean Corpuscular Hemoglobin


  34.2 PG


(27.0-31.0)  H 


  


  


 


 


Mean Corpuscular Hemoglobin


Concent 33.1 G/DL


(32.0-36.0) 


  


  


 


 


Red Cell Distribution Width


  12.2 %


(11.6-14.8) 


  


  


 


 


Platelet Count


  250 K/UL


(150-450) 


  


  


 


 


Mean Platelet Volume


  6.7 FL


(6.5-10.1) 


  


  


 


 


Neutrophils (%) (Auto)


  50.3 %


(45.0-75.0) 


  


  


 


 


Lymphocytes (%) (Auto)


  34.1 %


(20.0-45.0) 


  


  


 


 


Monocytes (%) (Auto)


  14.0 %


(1.0-10.0)  H 


  


  


 


 


Eosinophils (%) (Auto)


  0.3 %


(0.0-3.0) 


  


  


 


 


Basophils (%) (Auto)


  1.4 %


(0.0-2.0) 


  


  


 


 


Prothrombin Time


  11.0 SEC


(9.30-11.50) 


  


  


 


 


Prothromb Time International


Ratio 1.0 (0.9-1.1)  


  


  


  


 


 


Activated Partial


Thromboplast Time 27 SEC (23-33)


  


  


  


 


 


Sodium Level


  131 MMOL/L


(136-145)  L 


  


  


 


 


Potassium Level


  3.8 MMOL/L


(3.5-5.1) 


  


  


 


 


Chloride Level


  97 MMOL/L


()  L 


  


  


 


 


Carbon Dioxide Level


  28 MMOL/L


(21-32) 


  


  


 


 


Anion Gap


  6 mmol/L


(5-15) 


  


  


 


 


Blood Urea Nitrogen


  11 mg/dL


(7-18) 


  


  


 


 


Creatinine


  0.5 MG/DL


(0.55-1.30)  L 


  


  


 


 


Estimat Glomerular Filtration


Rate > 60 mL/min


(>60) 


  


  


 


 


Glucose Level


  119 MG/DL


()  H 


  


  


 


 


Lactic Acid Level


  2.30 mmol/L


(0.4-2.0)  H 


  


  2.20 mmol/L


(0.66-2.22)


 


Calcium Level


  8.6 MG/DL


(8.5-10.1) 


  


  


 


 


Phosphorus Level


  3.3 MG/DL


(2.5-4.9) 


  


  


 


 


Magnesium Level


  2.2 MG/DL


(1.8-2.4) 


  


  


 


 


Total Bilirubin


  0.3 MG/DL


(0.2-1.0) 


  


  


 


 


Aspartate Amino Transf


(AST/SGOT) 20 U/L (15-37)


  


  


  


 


 


Alanine Aminotransferase


(ALT/SGPT) 24 U/L (12-78)


  


  


  


 


 


Alkaline Phosphatase


  63 U/L


() 


  


  


 


 


Total Creatine Kinase


  31 U/L


() 


  


  


 


 


Creatine Kinase MB


  < 0.5 NG/ML


(0.0-3.6) 


  


  


 


 


Creatine Kinase MB Relative


Index 1.6  


  


  


  


 


 


Troponin I


  0.025 ng/mL


(0.000-0.056) 


  


  


 


 


Pro-B-Type Natriuretic Peptide


  296 pg/mL


(0-125)  H 


  


  


 


 


Total Protein


  7.1 G/DL


(6.4-8.2) 


  


  


 


 


Albumin


  2.3 G/DL


(3.4-5.0)  L 


  


  


 


 


Globulin 4.8 g/dL     


 


Albumin/Globulin Ratio


  0.5 (1.0-2.7)


L 


  


  


 


 


Urine Color  Pale yellow    


 


Urine Appearance  Clear    


 


Urine pH  6 (4.5-8.0)    


 


Urine Specific Gravity


  


  1.015


(1.005-1.035) 


  


 


 


Urine Protein


  


  1+ (NEGATIVE)


H 


  


 


 


Urine Glucose (UA)


  


  Negative


(NEGATIVE) 


  


 


 


Urine Ketones


  


  Negative


(NEGATIVE) 


  


 


 


Urine Blood


  


  1+ (NEGATIVE)


H 


  


 


 


Urine Nitrite


  


  Negative


(NEGATIVE) 


  


 


 


Urine Bilirubin


  


  Negative


(NEGATIVE) 


  


 


 


Urine Urobilinogen


  


  Normal MG/DL


(0.0-1.0) 


  


 


 


Urine Leukocyte Esterase


  


  1+ (NEGATIVE)


H 


  


 


 


Urine RBC


  


  0-2 /HPF (0 -


2) 


  


 


 


Urine WBC


  


  10-15 /HPF (0


- 2)  H 


  


 


 


Urine Squamous Epithelial


Cells 


  Occasional


/LPF 


  


 


 


Urine Bacteria


  


  Occasional


/HPF (NONE) 


  


 


 


Arterial Blood pH


  


  


  7.350


(7.350-7.450) 


 


 


Arterial Blood Partial


Pressure CO2 


  


  44.7 mmHg


(35.0-45.0) 


 


 


Arterial Blood Partial


Pressure O2 


  


  306.4 mmHg


(75.0-100.0)  H 


 


 


Arterial Blood HCO3


  


  


  24.1 mmol/L


(22.0-26.0) 


 


 


Arterial Blood Oxygen


Saturation 


  


  99.1 %


() 


 


 


Arterial Blood Base Excess   -1.6 (-2-2)   


 


Cole Test   Positive   











Microbiology








 Date/Time


Source Procedure


Growth Status


 


 


 8/22/20 11:40


Nasopharynx SARS-CoV-2 RdRp Gene Assay - Final Complete





 8/22/20 11:40


Nasal Nares - Final Complete


 


 8/22/20 11:40


Nasal Nares - Final Complete








Height (Feet):  5


Height (Inches):  3.00


Weight (Pounds):  140


Medications





Current Medications








 Medications


  (Trade)  Dose


 Ordered  Sig/Didi


 Route


 PRN Reason  Start Time


 Stop Time Status Last Admin


Dose Admin


 


 Acetaminophen


  (Tylenol)  650 mg  Q4H  PRN


 ORAL


 FEVER  8/22/20 16:15


 9/21/20 16:14   


 


 


 Albuterol/


 Ipratropium


  (Albuterol/


 Ipratropium)  3 ml  Q4H  PRN


 HHN


 Shortness of Breath  8/22/20 16:15


 8/27/20 16:14   


 


 


 Baclofen


  (Lioresal)  10 mg  Q8HR


 GT


   8/22/20 22:00


 9/21/20 21:59   


 


 


 Dextrose


  (Dextrose 50%)  25 ml  Q30M  PRN


 IV


 Hypoglycemia  8/22/20 16:15


 11/20/20 16:14   


 


 


 Dextrose


  (Dextrose 50%)  50 ml  Q30M  PRN


 IV


 Hypoglycemia  8/22/20 16:15


 11/20/20 16:14   


 


 


 Heparin Sodium


  (Porcine)


  (Heparin 5000


 units/ml)  5,000 units  EVERY 12  HOURS


 SUBQ


   8/22/20 21:00


 10/6/20 20:59   


 


 


 Levothyroxine


 Sodium


  (Synthroid)  75 mcg  DAILY


 GT


   8/23/20 09:00


 9/22/20 08:59   


 


 


 Ondansetron HCl


  (Zofran)  4 mg  Q6H  PRN


 IVP


 Nausea & Vomiting  8/22/20 16:15


 9/21/20 16:14   


 


 


 Pantoprazole


  (Protonix)  40 mg  DAILY


 IV


   8/23/20 09:00


 9/22/20 08:59   


 


 


 Piperacillin Sod/


 Tazobactam Sod


 3.375 gm/Sodium


 Chloride  110 ml @ 


 220 mls/hr  EVERY 8  HOURS


 IVPB


   8/22/20 22:00


 8/29/20 21:59   


 


 


 Polyethylene


 Glycol


  (Miralax)  17 gm  DAILYPRN  PRN


 ORAL


 Constipation  8/22/20 16:15


 9/21/20 16:14   


 


 


 Temazepam


  (Restoril)  15 mg  HSPRN  PRN


 ORAL


 Insomnia  8/22/20 16:15


 8/29/20 16:14   


 


 


 Valproic Acid


  (Depakene)  500 mg  EVERY 8  HOURS


 GT


   8/22/20 22:00


 9/21/20 21:59   


 


 


 Vancomycin HCl 1


 gm/Dextrose  275 ml @ 


 183.3 mls/


 hr  Q12H


 IVPB


   8/23/20 00:00


 8/28/20 00:00   


 











Assessment/Plan


Problem List:  


(1) Respiratory distress


ICD Codes:  R06.03 - Acute respiratory distress


SNOMED:  965973829


(2) Seizure disorder


ICD Codes:  G40.909 - Epilepsy, unspecified, not intractable, without status 

epilepticus


SNOMED:  584920827


(3) Hypothyroidism


ICD Codes:  E03.9 - Hypothyroidism, unspecified


SNOMED:  41056164


(4) Trisomy 21, Down syndrome


ICD Codes:  Q90.9 - Down syndrome, unspecified


SNOMED:  40999086


(5) Encephalopathy chronic


ICD Codes:  G93.49 - Other encephalopathy


SNOMED:  02300069


Assessment/Plan:


respiratory treatment


check sputum


iv abx


iv fluids


seizure precaution


pan culture


resume gtube feeding


dvt prophylaxis











Chano Cherry MD Aug 22, 2020 16:41

## 2020-08-22 NOTE — NUR
NURSE NOTES:

Placed a call and left a voice mail to Dr Cherry regarding patient's BP 96/59. Pt is asleep 
at this time. Elevated foot of the bed. Awaiting for call back for any orders.

## 2020-08-22 NOTE — NUR
NURSE NOTES:

Received report from LAYTON López. Patient arrived to unit with simple mask on 5L SpO2 95%, 
patient noted with crackles - per ER nurse patient deep suctioned in ER multiple times with 
purulent drainage. Sputum culture sent to lab for analysis. Patient noted with medical 
history of down syndrome, able to open eyes, unable to answer questions at this time. 
Observed no presence of pain or discomfort. No belongings noted. Patient noted admitted with 
bilateral heel redness, bilateral feet and toes redness and dryness, sacral skin integrity 
is intact. Wound pictures taken, wound care plan entered, wound care nurse made aware and 
will be assessed tomorrow. Pericare rendered, void x 1, soft BM x 1. Patient provided 
purwick. Charge nurse at bedside for assessments. Dr. Cherry aware of admission and 
admission orders to be entered by Dr. Cherry. Will continue to monitor patient.

## 2020-08-23 VITALS — DIASTOLIC BLOOD PRESSURE: 57 MMHG | SYSTOLIC BLOOD PRESSURE: 109 MMHG

## 2020-08-23 VITALS — DIASTOLIC BLOOD PRESSURE: 54 MMHG | SYSTOLIC BLOOD PRESSURE: 116 MMHG

## 2020-08-23 VITALS — DIASTOLIC BLOOD PRESSURE: 70 MMHG | SYSTOLIC BLOOD PRESSURE: 101 MMHG

## 2020-08-23 VITALS — SYSTOLIC BLOOD PRESSURE: 100 MMHG | DIASTOLIC BLOOD PRESSURE: 62 MMHG

## 2020-08-23 VITALS — DIASTOLIC BLOOD PRESSURE: 52 MMHG | SYSTOLIC BLOOD PRESSURE: 100 MMHG

## 2020-08-23 VITALS — DIASTOLIC BLOOD PRESSURE: 79 MMHG | SYSTOLIC BLOOD PRESSURE: 112 MMHG

## 2020-08-23 LAB
ADD MANUAL DIFF: NO
ALBUMIN SERPL-MCNC: 2.1 G/DL (ref 3.4–5)
ANION GAP SERPL CALC-SCNC: 7 MMOL/L (ref 5–15)
BASOPHILS NFR BLD AUTO: 0.9 % (ref 0–2)
BUN SERPL-MCNC: 11 MG/DL (ref 7–18)
CALCIUM SERPL-MCNC: 8.5 MG/DL (ref 8.5–10.1)
CHLORIDE SERPL-SCNC: 100 MMOL/L (ref 98–107)
CO2 SERPL-SCNC: 28 MMOL/L (ref 21–32)
CREAT SERPL-MCNC: 0.5 MG/DL (ref 0.55–1.3)
EOSINOPHIL NFR BLD AUTO: 1.1 % (ref 0–3)
ERYTHROCYTE [DISTWIDTH] IN BLOOD BY AUTOMATED COUNT: 12 % (ref 11.6–14.8)
HCT VFR BLD CALC: 39.8 % (ref 37–47)
HGB BLD-MCNC: 13.3 G/DL (ref 12–16)
LYMPHOCYTES NFR BLD AUTO: 25.7 % (ref 20–45)
MCV RBC AUTO: 104 FL (ref 80–99)
MONOCYTES NFR BLD AUTO: 10.9 % (ref 1–10)
NEUTROPHILS NFR BLD AUTO: 61.4 % (ref 45–75)
PHOSPHATE SERPL-MCNC: 3.7 MG/DL (ref 2.5–4.9)
PLATELET # BLD: 206 K/UL (ref 150–450)
POTASSIUM SERPL-SCNC: 4.1 MMOL/L (ref 3.5–5.1)
RBC # BLD AUTO: 3.84 M/UL (ref 4.2–5.4)
SODIUM SERPL-SCNC: 135 MMOL/L (ref 136–145)
WBC # BLD AUTO: 7.5 K/UL (ref 4.8–10.8)

## 2020-08-23 RX ADMIN — PANTOPRAZOLE SODIUM SCH MG: 40 INJECTION, POWDER, FOR SOLUTION INTRAVENOUS at 09:11

## 2020-08-23 RX ADMIN — VALPROIC ACID SCH MG: 250 SOLUTION ORAL at 15:47

## 2020-08-23 RX ADMIN — HEPARIN SODIUM SCH UNITS: 5000 INJECTION INTRAVENOUS; SUBCUTANEOUS at 21:36

## 2020-08-23 RX ADMIN — DEXTROSE MONOHYDRATE SCH MLS/HR: 50 INJECTION, SOLUTION INTRAVENOUS at 05:07

## 2020-08-23 RX ADMIN — AZITHROMYCIN DIHYDRATE SCH MLS/HR: 500 INJECTION, POWDER, LYOPHILIZED, FOR SOLUTION INTRAVENOUS at 15:44

## 2020-08-23 RX ADMIN — SODIUM CHLORIDE SCH MLS/HR: 9 INJECTION, SOLUTION INTRAVENOUS at 12:07

## 2020-08-23 RX ADMIN — DEXTROSE MONOHYDRATE SCH MLS/HR: 50 INJECTION, SOLUTION INTRAVENOUS at 21:38

## 2020-08-23 RX ADMIN — DEXTROSE MONOHYDRATE SCH MLS/HR: 50 INJECTION, SOLUTION INTRAVENOUS at 15:38

## 2020-08-23 RX ADMIN — VALPROIC ACID SCH MG: 250 SOLUTION ORAL at 05:06

## 2020-08-23 RX ADMIN — VALPROIC ACID SCH MG: 250 SOLUTION ORAL at 21:37

## 2020-08-23 RX ADMIN — HEPARIN SODIUM SCH UNITS: 5000 INJECTION INTRAVENOUS; SUBCUTANEOUS at 09:18

## 2020-08-23 NOTE — CONSULTATION
DATE OF CONSULTATION:  08/23/2020

CONSULTING PHYSICIAN:  Nicholas Camara M.D.



REQUESTING PHYSICIAN:  Chano Cherry M.D.



REASON FOR CONSULTATION:  Evaluation of the patient for pneumonia, sepsis,

antibiotic management.



HISTORY OF PRESENT ILLNESS:  The patient is a 59-year-old female with

multiple medical problems, who was admitted to this medical center due to

hypoxemia, having fever, respiratory secretions.  Infectious Disease

consultation has been requested for further evaluation of the patient and

for management of pneumonia.



PAST MEDICAL HISTORY:

1. Down syndrome.

2. Seizure disorder.

3. History of PEG tube placement.

4. History of hypothyroidism.



ALLERGIES:  No known drug allergies.



MEDICATIONS:  The patient is on vancomycin and Zosyn.



SOCIAL HISTORY:  The patient lives in a nursing home.



FAMILY HISTORY:  Noncontributory.



REVIEW OF SYSTEMS:  Unobtainable.



PHYSICAL EXAM:

VITAL SIGNS:  Temperature 97.7, pulse 86, respiratory rate 18, and blood

pressure 100/62.

HEENT:  No pale conjunctivae.  No icterus.

NECK:  No lymphadenopathy.

CHEST:  Coarse breathing sounds.

HEART:  S1 and S2.

ABDOMEN:  Soft.  PEG tube in place.

EXTREMITIES:  No cyanosis at this time _____.

NEUROLOGIC:  Awake.



LABS:  White blood cells 7.5, hemoglobin 13, platelets 206.  UA

unremarkable.  BUN 11, creatinine 0.1.  ALT, AST, alkaline phosphatase

unremarkable.  Sputum culture pending.  SARS COVID x 1 negative.  Blood

culture x1 was growing gram positive cocci in clusters.  Chest x-ray,

patchy bilateral interstitial opacities.



ASSESSMENT:

1. Pneumonia.

2. Positive blood culture, gram-positive cocci? contaminant versus

real.

3. Seizure disorder.

4. Hypothyroidism.

5. Down syndrome.



PLAN:

1. We will continue the patient on vancomycin and Zosyn.  We will add

Zithromax for atypical coverage.

2. Monitor CBC.

3. Monitor BMP.

4. Monitor culture (blood, urine, sputum).

5. Repeat blood culture x2.

6. COVID test number 2, _____, discussed with RN.

7. Monitor chest x-ray.

8. Monitor the patient's clinical course and labs.  Based on those, we

will do further recommendation.



Thank you, Dr. Cherry, for allowing me to participate in the care of

this patient.  I will follow the patient with you at this

hospitalization.









  ______________________________________________

  Nicholas Camara M.D.





DR:  [facility]

D:  08/23/2020 12:52

T:  08/23/2020 18:58

JOB#:  [id number]

CC:

## 2020-08-23 NOTE — CONSULTATION
History of Present Illness


General


Chief Complaint:  Dyspnea/Respdistress





Present Illness


[facility] #   [id number]


Allergies:  


Coded Allergies:  


     No Known Allergies (Unverified , 1/8/19)





Medication History


Scheduled


Acetaminophen* (Acetaminophen 325MG Tablet*), 640 MG GT DAILY, (Reported)


Ascorbic Acid* (Vitamin C*), 500 MG GT DAILY, (Reported)


Ascorbic Acid* (Vitamin C*), 500 MG GT DAILY, (Reported)


Baclofen* (Baclofen*), 10 MG GT Q8HR, (Reported)


Cefepime Hcl/D5w (Cefepime-Dextrose 2 Gm/50 Ml), 2 GM IVPB EVERY 12 HOURS, (

Reported)


Docusate Sodium* (Docusate Sodium*), 100 MG GT TWICE A DAY, (Reported)


Ipratropium/Albuterol Sulfate (DuoNeb 0.5-3(2.5)mg/3ml), 3 ML HHN Q4HR, (

Reported)


Levetiracetam (Keppra), 750 MG GT TWICE A DAY, (Reported)


Levothyroxine Sodium* (Levothyroxine Sodium*), 75 MCG GT DAILY, (Reported)


Levothyroxine Sodium* (Levothyroxine Sodium*), 125 MCG ORAL DAILY@0630


Levothyroxine Sodium* (Levothyroxine Sodium*), 125 MCG ORAL DAILY, (Reported)


Lorazepam* (Ativan*), 0.5 MG ORAL THREE TIMES A DAY, (Reported)


Lorazepam* (Ativan*), 1 MG ORAL BEDTIME, (Reported)


Magnesium Hydroxide* (Milk Of Magnesia*), 30 ML ORAL DAILY, (Reported)


Magnesium Hydroxide* (Milk Of Magnesia*), 30 ML ORAL DAILY, (Reported)


Multivitamin With Minerals (Multivitamins With Minerals*), 1 TAB ORAL DAILY, (

Reported)


Polyethylene Glycol 3350* (Miralax*), 17 GM ORAL DAILY, (Reported)


Vancomycin Hcl (Vancomycin Hcl), 1 GM IV Q12HR, (Reported)


Vitamin D (Vitamin D3), 100 UNITS GT DAILY, (Reported)





Scheduled PRN


Acetaminophen* (Acetaminophen 325MG Tablet*), 650 MG GT Q4H PRN for Mild Pain (

Pain Scale 1-3), (Reported)


Guaifenesin/Codeine Phos* (Robitussin Ac*), 1 TSP ORAL Q4H PRN for For Cough, (

Reported)





Miscellaneous Medications


Acetaminophen (Tylenol), 325 MG PO, (Reported)


Calcium Carbonate/Vitamin D3 (Calcium 500 + D Tablet), 1 EACH PO, (Reported)


Divalproex Sodium (Depakote Sprinkle), 500 MG PO, (Reported)


Levetiracetam (Keppra), 500 MG IV, (Reported)


Valproate Sodium (Valproic Acid), 500 MG GT, (Reported)





Patient History


Healthcare decision maker





Resuscitation status





Advanced Directive on File








Physical Exam





Last 24 Hour Vital Signs








  Date Time  Temp Pulse Resp B/P (MAP) Pulse Ox O2 Delivery O2 Flow Rate FiO2


 


8/23/20 12:00        35


 


8/23/20 12:00      Venturi Mask 5.0 


 


8/23/20 10:34      Venturi Mask 5.0 


 


8/23/20 08:15     98 Nasal Cannula 436.0 36


 


8/23/20 08:14  63 20  98 Nasal Cannula 4.0 36


 


8/23/20 08:00  70      


 


8/23/20 08:00 97.7 68 18 100/62 (75) 98   


 


8/23/20 08:00        35


 


8/23/20 08:00      Venturi Mask 5.0 


 


8/23/20 04:00      Venturi Mask 5.0 


 


8/23/20 04:00        35


 


8/23/20 04:00 97.9 79 20 112/79 (90) 97   


 


8/23/20 03:54  82      


 


8/23/20 00:00      Venturi Mask 5.0 


 


8/23/20 00:00 97.7 77 20 116/54 (74) 100   


 


8/22/20 23:39  61      


 


8/22/20 21:00     97 Venturi Mask 6.0 35


 


8/22/20 20:00      Venturi Mask 5.0 


 


8/22/20 20:00        35


 


8/22/20 20:00 97.3 60 24 96/59 (71) 100   


 


8/22/20 20:00  57 20  97 Venturi Mask 6.0 35


 


8/22/20 19:33  61      


 


8/22/20 16:51      Venturi Mask 6.0 


 


8/22/20 16:00  79      


 


8/22/20 15:15 98.8 67 22 99/65 99 Simple Mask 6.0 

















Intake and Output  


 


 8/22/20 8/23/20





 19:00 07:00


 


Intake Total 1 ml 1005.0 ml


 


Output Total  800 ml


 


Balance 1 ml 205.0 ml


 


  


 


Intake Oral 1 ml 


 


Free Water  150 ml


 


IV Total  495.0 ml


 


Tube Feeding  360 ml


 


Output Urine Total  800 ml


 


# Voids 1 


 


# Bowel Movements 2 











Laboratory Tests








Test


  8/22/20


13:20 8/23/20


06:30


 


Lactic Acid Level


  2.20 mmol/L


(0.66-2.22) 


 


 


White Blood Count


  


  7.5 K/UL


(4.8-10.8)


 


Red Blood Count


  


  3.84 M/UL


(4.20-5.40)  L


 


Hemoglobin


  


  13.3 G/DL


(12.0-16.0)


 


Hematocrit


  


  39.8 %


(37.0-47.0)


 


Mean Corpuscular Volume


  


  104 FL (80-99)


H


 


Mean Corpuscular Hemoglobin


  


  34.5 PG


(27.0-31.0)  H


 


Mean Corpuscular Hemoglobin


Concent 


  33.3 G/DL


(32.0-36.0)


 


Red Cell Distribution Width


  


  12.0 %


(11.6-14.8)


 


Platelet Count


  


  206 K/UL


(150-450)


 


Mean Platelet Volume


  


  7.0 FL


(6.5-10.1)


 


Neutrophils (%) (Auto)


  


  61.4 %


(45.0-75.0)


 


Lymphocytes (%) (Auto)


  


  25.7 %


(20.0-45.0)


 


Monocytes (%) (Auto)


  


  10.9 %


(1.0-10.0)  H


 


Eosinophils (%) (Auto)


  


  1.1 %


(0.0-3.0)


 


Basophils (%) (Auto)


  


  0.9 %


(0.0-2.0)


 


Sodium Level


  


  135 MMOL/L


(136-145)  L


 


Potassium Level


  


  4.1 MMOL/L


(3.5-5.1)


 


Chloride Level


  


  100 MMOL/L


()


 


Carbon Dioxide Level


  


  28 MMOL/L


(21-32)


 


Anion Gap


  


  7 mmol/L


(5-15)


 


Blood Urea Nitrogen


  


  11 mg/dL


(7-18)


 


Creatinine


  


  0.5 MG/DL


(0.55-1.30)  L


 


Estimat Glomerular Filtration


Rate 


  > 60 mL/min


(>60)


 


Glucose Level


  


  106 MG/DL


()


 


Calcium Level


  


  8.5 MG/DL


(8.5-10.1)


 


Phosphorus Level


  


  3.7 MG/DL


(2.5-4.9)


 


Albumin


  


  2.1 G/DL


(3.4-5.0)  L











Microbiology








 Date/Time


Source Procedure


Growth Status


 


 


 8/22/20 17:29


Sputum Gram Stain - Final Resulted


 


 8/22/20 17:29


Sputum Sputum Culture


Pending Resulted








Height (Feet):  5


Height (Inches):  3.00


Weight (Pounds):  140


Medications





Current Medications








 Medications


  (Trade)  Dose


 Ordered  Sig/Didi


 Route


 PRN Reason  Start Time


 Stop Time Status Last Admin


Dose Admin


 


 Acetaminophen


  (Tylenol)  650 mg  Q4H  PRN


 ORAL


 FEVER  8/22/20 16:15


 9/21/20 16:14   


 


 


 Albuterol/


 Ipratropium


  (Albuterol/


 Ipratropium)  3 ml  Q4H  PRN


 HHN


 Shortness of Breath  8/22/20 16:15


 8/27/20 16:14   


 


 


 Baclofen


  (Lioresal)  10 mg  Q8HR


 GT


   8/22/20 22:00


 9/21/20 21:59  8/23/20 05:06


 


 


 Dextrose


  (Dextrose 50%)  25 ml  Q30M  PRN


 IV


 Hypoglycemia  8/22/20 16:15


 11/20/20 16:14   


 


 


 Dextrose


  (Dextrose 50%)  50 ml  Q30M  PRN


 IV


 Hypoglycemia  8/22/20 16:15


 11/20/20 16:14   


 


 


 Heparin Sodium


  (Porcine)


  (Heparin 5000


 units/ml)  5,000 units  EVERY 12  HOURS


 SUBQ


   8/22/20 21:00


 10/6/20 20:59  8/23/20 09:18


 


 


 Levothyroxine


 Sodium


  (Synthroid)  75 mcg  DAILY


 GT


   8/23/20 09:00


 9/22/20 08:59  8/23/20 09:11


 


 


 Ondansetron HCl


  (Zofran)  4 mg  Q6H  PRN


 IVP


 Nausea & Vomiting  8/22/20 16:15


 9/21/20 16:14   


 


 


 Pantoprazole


  (Protonix)  40 mg  DAILY


 IV


   8/23/20 09:00


 9/22/20 08:59  8/23/20 09:11


 


 


 Piperacillin Sod/


 Tazobactam Sod


 3.375 gm/Sodium


 Chloride  110 ml @ 


 220 mls/hr  EVERY 8  HOURS


 IVPB


   8/22/20 22:00


 8/29/20 21:59  8/23/20 05:07


 


 


 Polyethylene


 Glycol


  (Miralax)  17 gm  DAILYPRN  PRN


 ORAL


 Constipation  8/22/20 16:15


 9/21/20 16:14   


 


 


 Temazepam


  (Restoril)  15 mg  HSPRN  PRN


 ORAL


 Insomnia  8/22/20 16:15


 8/29/20 16:14   


 


 


 Valproic Acid


  (Depakene)  500 mg  EVERY 8  HOURS


 GT


   8/22/20 22:00


 9/21/20 21:59  8/23/20 05:06


 


 


 Vancomycin HCl 1


 gm/Dextrose  275 ml @ 


 183.3 mls/


 hr  Q12H


 IVPB


   8/23/20 00:00


 8/28/20 00:00  8/23/20 12:07


 

















Nicholas Camara MD Aug 23, 2020 12:50

## 2020-08-23 NOTE — NUR
NURSE NOTES:

Received patient from [facility]AY[facility] Abernathy. Patient asleep bed in lowest position, call within reach. 
Venturi mask and G Tube feeding running. Will continue to monitor.

## 2020-08-23 NOTE — NUR
NURSE NOTES:

notified doctor Mariely about pt sputum results.  He ordered a 2nd covid test for pt. today.

## 2020-08-23 NOTE — NUR
RD ASSESSMENT & RECOMMENDATIONS

SEE CARE ACTIVITY FOR COMPLETE ASSESSMENT



DAILY ESTIMATED NEEDS:

Needs based on TF PTA, pulmonary 50kg abw  

22-27  kcals/kg 

5558-6872  total kcals

1.25-1.5  g protein/kg

63-75  g total protein 

25-30  mL/kg

4147-8349  total fluid mLs



NUTRITION DIAGNOSIS:

Swallowing difficulty r/t dysphagia as evidenced by pt w/ Downs Syndrome,

PEG dep for all nutritional needs.



(CURRENT TF: Jevity 1.2 @30ml/hr)



ENTERAL NUTRITION RECOMMENDATIONS:

JEVITY 1.2 @50ml/hr x22 hrs + Prosource x1 daily   

to provide 1100ml, 1320 kcal, 61g + 11g prot, 888ml free H2O  



- REC TO INCREASE RATE AND TF RUN TIME TO 22 HRS PER DAY TO BETTER MEET

EST NEEDS

- Hold TF 1 hr before and after synthroid meds

- Flush per MD, HOB over 30 degrees.





ADDITIONAL RECOMMENDATIONS:

1) Ht of 61 inches per [facility]; recalibrate bed scale for accurate CBW  

2) Monitor BG, lytes, and tolerance TF  

3) Weekly weights on Calibrated bed scale  

4) F/up with WC eval, add DERICK in 4oz H2O BID via GT 

.

## 2020-08-23 NOTE — NUR
NURSE HAND-OFF REPORT: 



Important Events on Shift: Suction oral secretions PRN

Patient Status:  stable

Diet:  Jevity 1.2 at 30cc/hr



Pending Orders: n

Pending Results/Labs: n

Pending MD notification: n



Latest Vital Signs: Temperature 97.9 , Pulse 79 , B/P 112 /79 , Respiratory Rate 20 , O2 SAT 
97 , Venturi Mask, O2 Flow Rate 5.0 .  

Vital Sign Comment: n



EKG Rhythm: Sinus Rhythm

Rhythm change?: N 

MD Notified?: -

MD Response: 



Latest Momin Fall Score: 60  

Fall Risk: High Risk 

Safety Measures: Call light Within Reach, Bed Alarm Zone 1, Side Rails Side Rails x3, Bed 
position Low and Locked.

Fall Precautions: 

Yellow Socks

Yellow Gown

Door Sign

Patient Fall Education



Report given to YANIQUE Jin RN.

## 2020-08-23 NOTE — NUR
NURSE HAND-OFF REPORT: 



Important Events on Shift:inserted 16 f espinal, pt retaining 900ml urine.  suction pt PRN she 
has a lot of phlegm sating 92-96

pt positive for gram +cocci in clusters doctor Mariley lopes.. side rails padded

Patient Status: Full code

Diet: gtube feeding 



Pending Orders: 

Pending Results/Labs:urine sample 

Pending MD notification:



Latest Vital Signs: Temperature 97.9 , Pulse 65 , B/P 101 /70 , Respiratory Rate 18 , O2 SAT 
97 , Venturi Mask, O2 Flow Rate 5.0 .  

Vital Sign Comment: 



EKG Rhythm: SR w BBB

Rhythm change?: N 

MD Notified?: -

MD Response: 



Latest Momin Fall Score: 60  

Fall Risk: High Risk 

Safety Measures: Call light Within Reach, Bed Alarm Zone 1, Side Rails Side Rails x3, Bed 
position Low and Locked.

Fall Precautions: y 

Yellow Socks   y 

Yellow Gown   y 

Door Sign  

Patient Fall Education  y 



Report given to Jayde/RN.

## 2020-08-23 NOTE — PULMONOLOGY PROGRESS NOTE
Subjective


ROS Limited/Unobtainable:  No


Constitutional:  Reports: no symptoms


HEENT:  Repors: no symptoms


Allergies:  


Coded Allergies:  


     No Known Allergies (Unverified , 1/8/19)





Objective





Last 24 Hour Vital Signs








  Date Time  Temp Pulse Resp B/P (MAP) Pulse Ox O2 Delivery O2 Flow Rate FiO2


 


8/23/20 16:57  79 20  99 Venturi Mask 8.0 40





  75 20  95   


 


8/23/20 16:00        35


 


8/23/20 16:00      Venturi Mask 5.0 


 


8/23/20 16:00 97.9 68 18 101/70 (80) 97   


 


8/23/20 12:00 98.1 60 18 109/57 (74) 95   


 


8/23/20 12:00        35


 


8/23/20 12:00      Venturi Mask 5.0 


 


8/23/20 12:00  64      


 


8/23/20 10:34      Venturi Mask 5.0 


 


8/23/20 08:15     98 Nasal Cannula 436.0 36


 


8/23/20 08:14  63 20  98 Nasal Cannula 4.0 36


 


8/23/20 08:00  70      


 


8/23/20 08:00 97.7 68 18 100/62 (75) 98   


 


8/23/20 08:00        35


 


8/23/20 08:00      Venturi Mask 5.0 


 


8/23/20 04:00      Venturi Mask 5.0 


 


8/23/20 04:00        35


 


8/23/20 04:00 97.9 79 20 112/79 (90) 97   


 


8/23/20 03:54  82      


 


8/23/20 00:00      Venturi Mask 5.0 


 


8/23/20 00:00 97.7 77 20 116/54 (74) 100   


 


8/22/20 23:39  61      


 


8/22/20 21:00     97 Venturi Mask 6.0 35


 


8/22/20 20:00      Venturi Mask 5.0 


 


8/22/20 20:00        35


 


8/22/20 20:00 97.3 60 24 96/59 (71) 100   


 


8/22/20 20:00  57 20  97 Venturi Mask 6.0 35


 


8/22/20 19:33  61      

















Intake and Output  


 


 8/22/20 8/23/20





 19:00 07:00


 


Intake Total 1 ml 1005.0 ml


 


Output Total  800 ml


 


Balance 1 ml 205.0 ml


 


  


 


Intake Oral 1 ml 


 


Free Water  150 ml


 


IV Total  495.0 ml


 


Tube Feeding  360 ml


 


Output Urine Total  800 ml


 


# Voids 1 


 


# Bowel Movements 2 








General Appearance:  WD/WN


HEENT:  normocephalic, atraumatic


Respiratory:  chest wall non-tender, lungs clear


Cardiovascular:  normal peripheral pulses, normal rate


Genitourinary:  normal external genitalia


Extremities:  no cyanosis


Skin:  no rash


Neurologic:  CNs II-XII grossly normal





Microbiology








 Date/Time


Source Procedure


Growth Status


 


 


 8/22/20 11:40


Blood Blood Culture - Preliminary Resulted


 


 8/23/20 14:10


Nasopharynx SARS-CoV-2 RdRp Gene Assay - Final Complete





 8/22/20 17:29


Sputum Gram Stain - Final Resulted


 


 8/22/20 17:29


Sputum Sputum Culture


Pending Resulted


 


 8/22/20 11:40


Nasopharynx SARS-CoV-2 RdRp Gene Assay - Final Complete





 8/22/20 11:40


Nasal Nares - Final Complete


 


 8/22/20 11:40


Nasal Nares - Final Complete








Laboratory Tests


8/23/20 06:30: 


White Blood Count 7.5, Red Blood Count 3.84L, Hemoglobin 13.3, Hematocrit 39.8, 

Mean Corpuscular Volume 104H, Mean Corpuscular Hemoglobin 34.5H, Mean 

Corpuscular Hemoglobin Concent 33.3, Red Cell Distribution Width 12.0, Platelet 

Count 206, Mean Platelet Volume 7.0, Neutrophils (%) (Auto) 61.4, Lymphocytes (%

) (Auto) 25.7, Monocytes (%) (Auto) 10.9H, Eosinophils (%) (Auto) 1.1, 

Basophils (%) (Auto) 0.9, Sodium Level 135L, Potassium Level 4.1, Chloride 

Level 100, Carbon Dioxide Level 28, Anion Gap 7, Blood Urea Nitrogen 11, 

Creatinine 0.5L, Estimat Glomerular Filtration Rate > 60, Glucose Level 106, 

Calcium Level 8.5, Phosphorus Level 3.7, Albumin 2.1L


8/23/20 14:10: Urine Legionella Antigen [Pending]


8/23/20 17:15: 


Legionella pneumophilia IgM Group 1 [Pending], Mycoplasma pneumoniae IgM Ab 

Titer [Pending]





Current Medications








 Medications


  (Trade)  Dose


 Ordered  Sig/Didi


 Route


 PRN Reason  Start Time


 Stop Time Status Last Admin


Dose Admin


 


 Acetaminophen


  (Tylenol)  650 mg  Q4H  PRN


 ORAL


 FEVER  8/22/20 16:15


 9/21/20 16:14   


 


 


 Albuterol/


 Ipratropium


  (Albuterol/


 Ipratropium)  3 ml  Q4H  PRN


 HHN


 Shortness of Breath  8/22/20 16:15


 8/27/20 16:14  8/23/20 16:59


 


 


 Azithromycin 500


 mg/Dextrose  275 ml @ 


 275 mls/hr  Q24HRS


 IV


   8/23/20 14:00


 8/29/20 14:59  8/23/20 15:44


 


 


 Baclofen


  (Lioresal)  10 mg  Q8HR


 GT


   8/22/20 22:00


 9/21/20 21:59  8/23/20 15:46


 


 


 Dextrose


  (Dextrose 50%)  25 ml  Q30M  PRN


 IV


 Hypoglycemia  8/22/20 16:15


 11/20/20 16:14   


 


 


 Dextrose


  (Dextrose 50%)  50 ml  Q30M  PRN


 IV


 Hypoglycemia  8/22/20 16:15


 11/20/20 16:14   


 


 


 Heparin Sodium


  (Porcine)


  (Heparin 5000


 units/ml)  5,000 units  EVERY 12  HOURS


 SUBQ


   8/22/20 21:00


 10/6/20 20:59  8/23/20 09:18


 


 


 Levothyroxine


 Sodium


  (Synthroid)  75 mcg  DAILY


 GT


   8/23/20 09:00


 9/22/20 08:59  8/23/20 09:11


 


 


 Ondansetron HCl


  (Zofran)  4 mg  Q6H  PRN


 IVP


 Nausea & Vomiting  8/22/20 16:15


 9/21/20 16:14   


 


 


 Pantoprazole


  (Protonix)  40 mg  DAILY


 IV


   8/23/20 09:00


 9/22/20 08:59  8/23/20 09:11


 


 


 Piperacillin Sod/


 Tazobactam Sod


 3.375 gm/Sodium


 Chloride  110 ml @ 


 220 mls/hr  EVERY 8  HOURS


 IVPB


   8/22/20 22:00


 8/29/20 21:59  8/23/20 15:38


 


 


 Polyethylene


 Glycol


  (Miralax)  17 gm  DAILYPRN  PRN


 ORAL


 Constipation  8/22/20 16:15


 9/21/20 16:14   


 


 


 Temazepam


  (Restoril)  15 mg  HSPRN  PRN


 ORAL


 Insomnia  8/22/20 16:15


 8/29/20 16:14   


 


 


 Valproic Acid


  (Depakene)  500 mg  EVERY 8  HOURS


 GT


   8/22/20 22:00


 9/21/20 21:59  8/23/20 15:47


 


 


 Vancomycin HCl 1


 gm/Dextrose  275 ml @ 


 183.3 mls/


 hr  Q12H


 IVPB


   8/23/20 00:00


 8/28/20 00:00  8/23/20 12:07


 











Assessment/Plan


Problems:  


(1) Respiratory distress


(2) Seizure disorder


(3) Hypothyroidism


(4) Trisomy 21, Down syndrome


(5) Encephalopathy chronic


Assessment/Plan


respiratory treatment


check sputum


iv abx


iv fluids


seizure precaution


pan culture


resume gtube feeding


dvt prophylaxis











Chano Cherry MD Aug 23, 2020 18:09

## 2020-08-23 NOTE — NUR
NURSE NOTES:

pt in bed resting. pt alert to name and non verbal.  pt is very congested, contacted RT to 
suctioned pt.  continue cardiac monitor, no signs of cardiac or respiratory distress at this 
time.  Bed locked and in lowest position, call light within reach.  Will continue to monitor

## 2020-08-23 NOTE — NUR
CASE MANAGEMENT:REVIEW



BIBA FROM [facility]



CC: SOB SATS 86% ON RA



SI: 

ACUTE RESPIRATORY DISTRESS

98.9   80  16  99/65  94% ON 15L NON REBREATHER

NA-131



IS: 1L NS BOLUS

IV VANCOMYCIN X1

IV CEFEPIME X1

IV FLAGYL X1

URINE CX

COVID SWAB

CXR

**: TO SDU

## 2020-08-24 VITALS — SYSTOLIC BLOOD PRESSURE: 120 MMHG | DIASTOLIC BLOOD PRESSURE: 69 MMHG

## 2020-08-24 VITALS — SYSTOLIC BLOOD PRESSURE: 118 MMHG | DIASTOLIC BLOOD PRESSURE: 62 MMHG

## 2020-08-24 VITALS — DIASTOLIC BLOOD PRESSURE: 81 MMHG | SYSTOLIC BLOOD PRESSURE: 128 MMHG

## 2020-08-24 VITALS — SYSTOLIC BLOOD PRESSURE: 108 MMHG | DIASTOLIC BLOOD PRESSURE: 59 MMHG

## 2020-08-24 VITALS — SYSTOLIC BLOOD PRESSURE: 127 MMHG | DIASTOLIC BLOOD PRESSURE: 53 MMHG

## 2020-08-24 VITALS — SYSTOLIC BLOOD PRESSURE: 122 MMHG | DIASTOLIC BLOOD PRESSURE: 67 MMHG

## 2020-08-24 LAB
ADD MANUAL DIFF: NO
ALBUMIN SERPL-MCNC: 2.2 G/DL (ref 3.4–5)
ALBUMIN/GLOB SERPL: 0.5 {RATIO} (ref 1–2.7)
ALP SERPL-CCNC: 60 U/L (ref 46–116)
ALT SERPL-CCNC: 15 U/L (ref 12–78)
ANION GAP SERPL CALC-SCNC: 12 MMOL/L (ref 5–15)
AST SERPL-CCNC: 17 U/L (ref 15–37)
BASOPHILS NFR BLD AUTO: 1.2 % (ref 0–2)
BILIRUB SERPL-MCNC: 0.3 MG/DL (ref 0.2–1)
BUN SERPL-MCNC: 8 MG/DL (ref 7–18)
CALCIUM SERPL-MCNC: 8.8 MG/DL (ref 8.5–10.1)
CHLORIDE SERPL-SCNC: 100 MMOL/L (ref 98–107)
CO2 SERPL-SCNC: 28 MMOL/L (ref 21–32)
CREAT SERPL-MCNC: 0.5 MG/DL (ref 0.55–1.3)
EOSINOPHIL NFR BLD AUTO: 1.1 % (ref 0–3)
ERYTHROCYTE [DISTWIDTH] IN BLOOD BY AUTOMATED COUNT: 12.5 % (ref 11.6–14.8)
GLOBULIN SER-MCNC: 4.6 G/DL
HCT VFR BLD CALC: 39.9 % (ref 37–47)
HGB BLD-MCNC: 13.2 G/DL (ref 12–16)
LYMPHOCYTES NFR BLD AUTO: 29.5 % (ref 20–45)
MCV RBC AUTO: 105 FL (ref 80–99)
MONOCYTES NFR BLD AUTO: 10.8 % (ref 1–10)
NEUTROPHILS NFR BLD AUTO: 57.4 % (ref 45–75)
PHOSPHATE SERPL-MCNC: 2.9 MG/DL (ref 2.5–4.9)
PLATELET # BLD: 242 K/UL (ref 150–450)
POTASSIUM SERPL-SCNC: 3.4 MMOL/L (ref 3.5–5.1)
RBC # BLD AUTO: 3.82 M/UL (ref 4.2–5.4)
SODIUM SERPL-SCNC: 140 MMOL/L (ref 136–145)
WBC # BLD AUTO: 8.6 K/UL (ref 4.8–10.8)

## 2020-08-24 RX ADMIN — VALPROIC ACID SCH MG: 250 SOLUTION ORAL at 21:02

## 2020-08-24 RX ADMIN — HEPARIN SODIUM SCH UNITS: 5000 INJECTION INTRAVENOUS; SUBCUTANEOUS at 09:00

## 2020-08-24 RX ADMIN — AZITHROMYCIN DIHYDRATE SCH MLS/HR: 500 INJECTION, POWDER, LYOPHILIZED, FOR SOLUTION INTRAVENOUS at 14:26

## 2020-08-24 RX ADMIN — DEXTROSE MONOHYDRATE SCH MLS/HR: 50 INJECTION, SOLUTION INTRAVENOUS at 21:02

## 2020-08-24 RX ADMIN — PANTOPRAZOLE SODIUM SCH MG: 40 INJECTION, POWDER, FOR SOLUTION INTRAVENOUS at 09:25

## 2020-08-24 RX ADMIN — DEXTROSE MONOHYDRATE SCH MLS/HR: 50 INJECTION, SOLUTION INTRAVENOUS at 14:53

## 2020-08-24 RX ADMIN — VALPROIC ACID SCH MG: 250 SOLUTION ORAL at 14:26

## 2020-08-24 RX ADMIN — DEXTROSE MONOHYDRATE SCH MLS/HR: 50 INJECTION, SOLUTION INTRAVENOUS at 06:18

## 2020-08-24 RX ADMIN — SODIUM CHLORIDE SCH MLS/HR: 9 INJECTION, SOLUTION INTRAVENOUS at 08:00

## 2020-08-24 RX ADMIN — SODIUM CHLORIDE SCH MLS/HR: 9 INJECTION, SOLUTION INTRAVENOUS at 00:26

## 2020-08-24 RX ADMIN — HEPARIN SODIUM SCH UNITS: 5000 INJECTION INTRAVENOUS; SUBCUTANEOUS at 21:00

## 2020-08-24 RX ADMIN — SODIUM CHLORIDE SCH MLS/HR: 9 INJECTION, SOLUTION INTRAVENOUS at 16:20

## 2020-08-24 RX ADMIN — VALPROIC ACID SCH MG: 250 SOLUTION ORAL at 06:17

## 2020-08-24 NOTE — NUR
NURSE NOTES:

left Voice mail to Dr. Cherry and notified regarding new ABG result. also notified Dr. Cherry regarding pt is keep desaturating every 30 minutes after suction to 84-81%. will 
wait for call back. will continue to monitor pt closely.

## 2020-08-24 NOTE — PULMONOLOGY PROGRESS NOTE
Subjective


ROS Limited/Unobtainable:  No


Constitutional:  Reports: no symptoms


HEENT:  Repors: no symptoms


Respiratory:  Reports: no symptoms


Allergies:  


Coded Allergies:  


     No Known Allergies (Unverified , 1/8/19)





Objective





Last 24 Hour Vital Signs








  Date Time  Temp Pulse Resp B/P (MAP) Pulse Ox O2 Delivery O2 Flow Rate FiO2


 


8/24/20 12:00      Venturi Mask 5.0 


 


8/24/20 12:00 97.9 91 20 127/53 (77) 94   


 


8/24/20 12:00        50


 


8/24/20 12:00  89      


 


8/24/20 08:00  84      


 


8/24/20 08:00      Venturi Mask 5.0 


 


8/24/20 08:00 97.9 81 20 120/69 (86) 94   


 


8/24/20 08:00        50


 


8/24/20 04:00 98.2 85 20 128/81 (97) 94   


 


8/24/20 04:00      Venturi Mask 5.0 


 


8/24/20 04:00  87      


 


8/24/20 04:00        50


 


8/24/20 00:00 98.4 94 28 118/62 (80) 92   


 


8/24/20 00:00        50


 


8/24/20 00:00      Venturi Mask 5.0 


 


8/23/20 20:00      Venturi Mask 5.0 


 


8/23/20 20:00 98.4 80 20 100/52 (68) 95   


 


8/23/20 20:00     94 Venturi Mask 6.0 35


 


8/23/20 20:00  80      


 


8/23/20 19:30  81 18  94 Venturi Mask 6.0 35


 


8/23/20 16:57  79 20  99 Venturi Mask 8.0 40





  75 20  95   


 


8/23/20 16:00        35


 


8/23/20 16:00  65      


 


8/23/20 16:00      Venturi Mask 5.0 


 


8/23/20 16:00 97.9 68 18 101/70 (80) 97   

















Intake and Output  


 


 8/23/20 8/24/20





 19:00 07:00


 


Intake Total  300 ml


 


Output Total  350 ml


 


Balance  -50 ml


 


  


 


Tube Feeding  300 ml


 


Output Urine Total  350 ml


 


# Bowel Movements 1 1








General Appearance:  WD/WN


HEENT:  normocephalic, atraumatic


Respiratory:  chest wall non-tender, lungs clear


Cardiovascular:  normal peripheral pulses, normal rate


Abdomen:  normal bowel sounds, rebound tenderness


Genitourinary:  normal external genitalia


Extremities:  no cyanosis


Skin:  no rash


Neurologic:  CNs II-XII grossly normal





Microbiology








 Date/Time


Source Procedure


Growth Status


 


 


 8/22/20 11:40


Blood Blood Culture - Preliminary


Gram Positive Cocci Resulted


 


 8/22/20 11:40


Blood Blood Culture - Preliminary Resulted


 


 8/23/20 14:10


Nasopharynx SARS-CoV-2 RdRp Gene Assay - Final Complete





 8/22/20 17:29


Sputum Gram Stain - Final Resulted


 


 8/22/20 17:29


Sputum Sputum Culture


Pending Resulted


 


 8/22/20 11:40


Nasopharynx SARS-CoV-2 RdRp Gene Assay - Final Complete





 8/22/20 11:40


Nasal Nares - Final Complete


 


 8/22/20 11:40


Nasal Nares - Final Complete


 


 8/22/20 11:50


Urine,Clean Catch Urine Culture - Preliminary


NO GROWTH AFTER 24 HOURS Resulted








Laboratory Tests


8/23/20 14:10: Urine Legionella Antigen [Pending]


8/23/20 17:15: 


Legionella pneumophilia IgM Group 1 [Pending], Mycoplasma pneumoniae IgM Ab 

Titer [Pending]


8/23/20 23:10: Vancomycin Level Trough 10.2


8/24/20 03:05: 


White Blood Count 8.6, Red Blood Count 3.82L, Hemoglobin 13.2, Hematocrit 39.9, 

Mean Corpuscular Volume 105H, Mean Corpuscular Hemoglobin 34.6H, Mean 

Corpuscular Hemoglobin Concent 33.1, Red Cell Distribution Width 12.5, Platelet 

Count 242, Mean Platelet Volume 6.2L, Neutrophils (%) (Auto) 57.4, Lymphocytes (

%) (Auto) 29.5, Monocytes (%) (Auto) 10.8H, Eosinophils (%) (Auto) 1.1, 

Basophils (%) (Auto) 1.2, Erythrocyte Sedimentation Rate 49H, Sodium Level 140, 

Potassium Level 3.4L, Chloride Level 100, Carbon Dioxide Level 28, Anion Gap 12

, Blood Urea Nitrogen 8, Creatinine 0.5L, Estimat Glomerular Filtration Rate > 

60, Glucose Level 87, Calcium Level 8.8, Phosphorus Level 2.9, Magnesium Level 

2.1, Total Bilirubin 0.3, Aspartate Amino Transf (AST/SGOT) 17, Alanine 

Aminotransferase (ALT/SGPT) 15, Alkaline Phosphatase 60, C-Reactive Protein, 

Quantitative 2.8H, Total Protein 6.8, Albumin 2.2L, Globulin 4.6, Albumin/

Globulin Ratio 0.5L





Current Medications








 Medications


  (Trade)  Dose


 Ordered  Sig/Didi


 Route


 PRN Reason  Start Time


 Stop Time Status Last Admin


Dose Admin


 


 Acetaminophen


  (Tylenol)  650 mg  Q4H  PRN


 ORAL


 FEVER  8/22/20 16:15


 9/21/20 16:14   


 


 


 Albuterol/


 Ipratropium


  (Albuterol/


 Ipratropium)  3 ml  Q4H  PRN


 HHN


 Shortness of Breath  8/22/20 16:15


 8/27/20 16:14  8/23/20 16:59


 


 


 Azithromycin 500


 mg/Dextrose  275 ml @ 


 275 mls/hr  Q24HRS


 IV


   8/23/20 14:00


 8/29/20 14:59  8/23/20 15:44


 


 


 Baclofen


  (Lioresal)  10 mg  Q8HR


 GT


   8/22/20 22:00


 9/21/20 21:59  8/24/20 06:17


 


 


 Dextrose


  (Dextrose 50%)  25 ml  Q30M  PRN


 IV


 Hypoglycemia  8/22/20 16:15


 11/20/20 16:14   


 


 


 Dextrose


  (Dextrose 50%)  50 ml  Q30M  PRN


 IV


 Hypoglycemia  8/22/20 16:15


 11/20/20 16:14   


 


 


 Heparin Sodium


  (Porcine)


  (Heparin 5000


 units/ml)  5,000 units  EVERY 12  HOURS


 SUBQ


   8/22/20 21:00


 10/6/20 20:59  8/23/20 21:36


 


 


 Levothyroxine


 Sodium


  (Synthroid)  75 mcg  DAILY


 GT


   8/23/20 09:00


 9/22/20 08:59  8/24/20 09:11


 


 


 Ondansetron HCl


  (Zofran)  4 mg  Q6H  PRN


 IVP


 Nausea & Vomiting  8/22/20 16:15


 9/21/20 16:14   


 


 


 Pantoprazole


  (Protonix)  40 mg  DAILY


 IV


   8/23/20 09:00


 9/22/20 08:59  8/24/20 09:25


 


 


 Piperacillin Sod/


 Tazobactam Sod


 3.375 gm/Sodium


 Chloride  110 ml @ 


 220 mls/hr  EVERY 8  HOURS


 IVPB


   8/22/20 22:00


 8/29/20 21:59  8/24/20 06:18


 


 


 Polyethylene


 Glycol


  (Miralax)  17 gm  DAILYPRN  PRN


 ORAL


 Constipation  8/22/20 16:15


 9/21/20 16:14   


 


 


 Temazepam


  (Restoril)  15 mg  HSPRN  PRN


 ORAL


 Insomnia  8/22/20 16:15


 8/29/20 16:14   


 


 


 Valproic Acid


  (Depakene)  500 mg  EVERY 8  HOURS


 GT


   8/22/20 22:00


 9/21/20 21:59  8/24/20 06:17


 


 


 Vancomycin HCl 1


 gm/Dextrose  275 ml @ 


 183.3 mls/


 hr  Q8H


 IVPB


   8/24/20 08:00


 8/29/20 07:59  8/24/20 08:00


 











Assessment/Plan


Problems:  


(1) Respiratory distress


(2) Seizure disorder


(3) Hypothyroidism


(4) Trisomy 21, Down syndrome


(5) Encephalopathy chronic


Assessment/Plan


doing better


respiratory treatment


check sputum


iv abx


iv fluids


seizure precaution


pan culture


resume gtube feeding


dvt prophylaxis











Chano Cherry MD Aug 24, 2020 12:52

## 2020-08-24 NOTE — INFECTIOUS DISEASES PROG NOTE
Assessment/Plan








ASSESSMENT:


1. Pneumonia.- COVID 19 neg x2


   Acute hypoxic resp filure on VM


          -COVID Rapid PCR neg 8/23, 8/23


          -8/22 spc x p





2. Positive blood culture, gram-positive cocci? contaminant versus real.


     -8/22 Bcx 2/2 sets GPC





   R/o probable UTI


          ua/ wbc 10-15, nit neg, leuk +1; ucx NTD





3. Seizure disorder.


4. Hypothyroidism.


5. Down syndrome.





History of PEG tube placement.


[facility] resident





PLAN:


1. We will continue the patient on vancomycin #3 and Zosyn #3. 


Zithromax #2 for atypical coverage.


    -8/22 SP Cefepime x1, Flagyl x1





2. Monitor CBC.


3. Monitor BMP.


4. Monitor culture (blood, urine, sputum).


5. f/u Repeat blood culture x2.


6. COVID neg x2


7. Monitor chest x-ray.


8. Monitor the patient's clinical course and labs.  Based on those, we


will do further recommendation.





Thank you, Dr. Cherry, for allowing me to participate in the care of


this patient.  I will follow the patient with you at this


hospitalization.





Subjective


Allergies:  


Coded Allergies:  


     No Known Allergies (Unverified , 1/8/19)


afebrile


on VM 50%, 15L





Objective





Last 24 Hour Vital Signs








  Date Time  Temp Pulse Resp B/P (MAP) Pulse Ox O2 Delivery O2 Flow Rate FiO2


 


8/24/20 16:00  90      


 


8/24/20 16:00 97.9 92 23 122/67 (85) 97   


 


8/24/20 16:00      Venturi Mask 15.0 


 


8/24/20 16:00        50


 


8/24/20 12:00      Venturi Mask 5.0 


 


8/24/20 12:00 97.9 91 20 127/53 (77) 94   


 


8/24/20 12:00        50


 


8/24/20 12:00  89      


 


8/24/20 08:00  84      


 


8/24/20 08:00      Venturi Mask 5.0 


 


8/24/20 08:00 97.9 81 20 120/69 (86) 94   


 


8/24/20 08:00        50


 


8/24/20 04:00 98.2 85 20 128/81 (97) 94   


 


8/24/20 04:00      Venturi Mask 5.0 


 


8/24/20 04:00  87      


 


8/24/20 04:00        50


 


8/24/20 00:00 98.4 94 28 118/62 (80) 92   


 


8/24/20 00:00        50


 


8/24/20 00:00      Venturi Mask 5.0 








Height (Feet):  5


Height (Inches):  3.00


Weight (Pounds):  144


HEENT:  No pale conjunctivae.  No icterus.


NECK:  No lymphadenopathy.


CHEST:  Coarse breathing sounds.


HEART:  S1 and S2.


ABDOMEN:  Soft.  PEG tube in place.


EXTREMITIES:  No cyanosis at this time .


NEUROLOGIC:  Awake.





Microbiology








 Date/Time


Source Procedure


Growth Status


 


 


 8/22/20 11:40


Blood Blood Culture - Preliminary


Gram Positive Cocci Resulted


 


 8/22/20 11:40


Blood Blood Culture - Preliminary Resulted


 


 8/23/20 14:10


Nasopharynx SARS-CoV-2 RdRp Gene Assay - Final Complete





 8/22/20 17:29


Sputum Gram Stain - Final Resulted


 


 8/22/20 17:29


Sputum Sputum Culture


Pending Resulted


 


 8/22/20 11:40


Nasopharynx SARS-CoV-2 RdRp Gene Assay - Final Complete





 8/22/20 11:40


Nasal Nares - Final Complete


 


 8/22/20 11:40


Nasal Nares - Final Complete


 


 8/22/20 11:50


Urine,Clean Catch Urine Culture - Preliminary


NO GROWTH AFTER 24 HOURS Resulted











Laboratory Tests








Test


  8/23/20


23:10 8/24/20


03:05


 


Vancomycin Level Trough


  10.2 ug/mL


(5.0-12.0) 


 


 


White Blood Count


  


  8.6 K/UL


(4.8-10.8)


 


Red Blood Count


  


  3.82 M/UL


(4.20-5.40)  L


 


Hemoglobin


  


  13.2 G/DL


(12.0-16.0)


 


Hematocrit


  


  39.9 %


(37.0-47.0)


 


Mean Corpuscular Volume


  


  105 FL (80-99)


H


 


Mean Corpuscular Hemoglobin


  


  34.6 PG


(27.0-31.0)  H


 


Mean Corpuscular Hemoglobin


Concent 


  33.1 G/DL


(32.0-36.0)


 


Red Cell Distribution Width


  


  12.5 %


(11.6-14.8)


 


Platelet Count


  


  242 K/UL


(150-450)


 


Mean Platelet Volume


  


  6.2 FL


(6.5-10.1)  L


 


Neutrophils (%) (Auto)


  


  57.4 %


(45.0-75.0)


 


Lymphocytes (%) (Auto)


  


  29.5 %


(20.0-45.0)


 


Monocytes (%) (Auto)


  


  10.8 %


(1.0-10.0)  H


 


Eosinophils (%) (Auto)


  


  1.1 %


(0.0-3.0)


 


Basophils (%) (Auto)


  


  1.2 %


(0.0-2.0)


 


Erythrocyte Sedimentation Rate


  


  49 MM/HR


(0-30)  H


 


Sodium Level


  


  140 MMOL/L


(136-145)


 


Potassium Level


  


  3.4 MMOL/L


(3.5-5.1)  L


 


Chloride Level


  


  100 MMOL/L


()


 


Carbon Dioxide Level


  


  28 MMOL/L


(21-32)


 


Anion Gap


  


  12 mmol/L


(5-15)


 


Blood Urea Nitrogen


  


  8 mg/dL (7-18)


 


 


Creatinine


  


  0.5 MG/DL


(0.55-1.30)  L


 


Estimat Glomerular Filtration


Rate 


  > 60 mL/min


(>60)


 


Glucose Level


  


  87 MG/DL


()


 


Calcium Level


  


  8.8 MG/DL


(8.5-10.1)


 


Phosphorus Level


  


  2.9 MG/DL


(2.5-4.9)


 


Magnesium Level


  


  2.1 MG/DL


(1.8-2.4)


 


Total Bilirubin


  


  0.3 MG/DL


(0.2-1.0)


 


Aspartate Amino Transf


(AST/SGOT) 


  17 U/L (15-37)


 


 


Alanine Aminotransferase


(ALT/SGPT) 


  15 U/L (12-78)


 


 


Alkaline Phosphatase


  


  60 U/L


()


 


C-Reactive Protein,


Quantitative 


  2.8 mg/dL


(0.00-0.90)  H


 


Total Protein


  


  6.8 G/DL


(6.4-8.2)


 


Albumin


  


  2.2 G/DL


(3.4-5.0)  L


 


Globulin  4.6 g/dL  


 


Albumin/Globulin Ratio


  


  0.5 (1.0-2.7)


L











Current Medications








 Medications


  (Trade)  Dose


 Ordered  Sig/Didi


 Route


 PRN Reason  Start Time


 Stop Time Status Last Admin


Dose Admin


 


 Acetaminophen


  (Tylenol)  650 mg  Q4H  PRN


 ORAL


 FEVER  8/22/20 16:15


 9/21/20 16:14   


 


 


 Albuterol/


 Ipratropium


  (Albuterol/


 Ipratropium)  3 ml  Q4H  PRN


 HHN


 Shortness of Breath  8/22/20 16:15


 8/27/20 16:14  8/23/20 16:59


 


 


 Azithromycin 500


 mg/Dextrose  275 ml @ 


 275 mls/hr  Q24HRS


 IV


   8/23/20 14:00


 8/29/20 14:59  8/24/20 14:26


 


 


 Baclofen


  (Lioresal)  10 mg  Q8HR


 GT


   8/22/20 22:00


 9/21/20 21:59  8/24/20 14:26


 


 


 Dextrose


  (Dextrose 50%)  25 ml  Q30M  PRN


 IV


 Hypoglycemia  8/22/20 16:15


 11/20/20 16:14   


 


 


 Dextrose


  (Dextrose 50%)  50 ml  Q30M  PRN


 IV


 Hypoglycemia  8/22/20 16:15


 11/20/20 16:14   


 


 


 Heparin Sodium


  (Porcine)


  (Heparin 5000


 units/ml)  5,000 units  EVERY 12  HOURS


 SUBQ


   8/22/20 21:00


 10/6/20 20:59  8/23/20 21:36


 


 


 Levothyroxine


 Sodium


  (Synthroid)  75 mcg  DAILY


 GT


   8/23/20 09:00


 9/22/20 08:59  8/24/20 09:11


 


 


 Ondansetron HCl


  (Zofran)  4 mg  Q6H  PRN


 IVP


 Nausea & Vomiting  8/22/20 16:15


 9/21/20 16:14   


 


 


 Pantoprazole


  (Protonix)  40 mg  DAILY


 IV


   8/23/20 09:00


 9/22/20 08:59  8/24/20 09:25


 


 


 Piperacillin Sod/


 Tazobactam Sod


 3.375 gm/Sodium


 Chloride  110 ml @ 


 220 mls/hr  EVERY 8  HOURS


 IVPB


   8/22/20 22:00


 8/29/20 21:59  8/24/20 14:53


 


 


 Polyethylene


 Glycol


  (Miralax)  17 gm  DAILYPRN  PRN


 ORAL


 Constipation  8/22/20 16:15


 9/21/20 16:14   


 


 


 Potassium Chloride


  (K-Dur)  40 meq  ONCE


 GT


   8/24/20 21:00


 8/24/20 22:00   


 


 


 Temazepam


  (Restoril)  15 mg  HSPRN  PRN


 ORAL


 Insomnia  8/22/20 16:15


 8/29/20 16:14   


 


 


 Valproic Acid


  (Depakene)  500 mg  EVERY 8  HOURS


 GT


   8/22/20 22:00


 9/21/20 21:59  8/24/20 14:26


 


 


 Vancomycin HCl 1


 gm/Dextrose  275 ml @ 


 183.3 mls/


 hr  Q8H


 IVPB


   8/24/20 08:00


 8/29/20 07:59  8/24/20 16:20


 

















Rema Gomes M.D. Aug 24, 2020 20:24

## 2020-08-24 NOTE — NUR
NURSE NOTES:WOUND CARE NOTES:Pt presented on admission with Moisture Intertrigo abdominal 
folds. Bilat groin folds are moist and erythematous. No erythema or evidence of skin 
breakdown noted to sacrum or buttocks. 

Non-Blanchable erythema without induration R Hallux. (L03cm x (W)3.5cm. 

R Heel is Boggy with non-Blanchable erythema. 

L heel is soft but easily blanches.

Soft necrosis noted to tip of R 5th metatarsal(L)1.2cm x (W)1cm.



Tx.Plan: Apply Moisture Barrier Paste to sacrum. Cover with Optifoam drsg. Change every 3 
days and prn.

            Apply Phytoplex Antifungal cream to abdominal folds and groin twice daily.(May 
apply with Moisture Barrier paste )

            Apply Cavilon Skin Barrier to both heels and malleoli.Cover each site with 
Optifoam drsgs. Change every 7 days and

            prn.

            Please request consult for further evaluation R 5th metatarsal.

            Reposition at least every 2hours or as tolerated.

            Place pillow between knees.

            Off-load heels with pillow.

-------------------------------------------------------------------------------

Addendum: 08/26/20 at 1413 by Jose Perea LVN

-------------------------------------------------------------------------------

ADDENDUM TO ABOVE WOUND NOTES: L 1st metatarsal deformity. toe is erythematous and mac 
erated with smal partial thickness wound plantar aspect (L)0.5cm x (W)0.7cm.

## 2020-08-24 NOTE — NUR
NURSE NOTES:

Recvd pt. Pt is sleeping in bed. Pt is on venturi mask FiO2 50%. Pt has GT running Jevity 
1.2 @ 30/hr and tolerating well. Pt has Valle cath in place and draining to gravity. Bed in 
lowest locked position, call light within reach, will continue with plan of care.

## 2020-08-24 NOTE — NUR
CASE MANAGEMENT: REVIEW





SI: PNA . DOWN SYNDROME . ENCEPHALOPATHY . SEIZURE DISORDER

T 97.9 HR 91 RR 28 /52 SAT 94% VENTURI MASK FIO2 50

K 3.4 CR 0.5 ALBUMIN 2.2 









IS: VANCOMYCIN IV Q8HR

AZITHROMYCIN IV Q24HR

PROTONIX IV QD

VALPROIC ACID GT Q8HR

ZOSYN IV Q8HR

HEPARIN SUBQ Q12HR









***STEP DOWN UNIT STATUS***

DCP: PATIENT IS FROM [facility]

## 2020-08-24 NOTE — NUR
NURSE HAND-OFF REPORT: 



Important Events on Shift: REPORTED BLOODY URINE TO MD. NO NEW ORDERS

Patient Status: STABLE

Diet: FEEDING



Pending Orders: N

Pending Results/Labs:N

Pending MD notification:N



Latest Vital Signs: Temperature 98.2 , Pulse 87 , B/P 128 /81 , Respiratory Rate 20 , O2 SAT 
94 , Venturi Mask, O2 Flow Rate 5.0 .  

Vital Sign Comment: STABLE



EKG Rhythm: SR w BBB

Rhythm change?: N 

MD Notified?: -

MD Response: 



Latest Momin Fall Score: 60  

Fall Risk: High Risk 

Safety Measures: Call light Within Reach, Bed Alarm Zone 1, Side Rails Side Rails x3, Bed 
position Low and Locked.

Fall Precautions: Y

Yellow Socks Y

Yellow Gown Y

Door Sign Y

Patient Fall Education Y



Report given to LAYTON VAZQUEZ.

## 2020-08-24 NOTE — HISTORY & PHYSICAL
History and Physical


History & Physicial


Dictated for Int Med-Sanya Brooks MD Aug 24, 2020 13:35

## 2020-08-24 NOTE — NUR
NURSE NOTES:

received pt from Mireya TRAYLOR.,  pt is awake and nonverbal. pt is on Ventury mask 50% with 
O2sat of 90%. oral suction given. Gtube site clean, intact, and patent. lef thand 20g intact 
clean, and patent. espinal cath draining well with gravity. side rails are padded. call light 
within reach. will continue to monitor pt with plan of care. bed at the lowest position, 
alarmed, and locked.

## 2020-08-25 VITALS — DIASTOLIC BLOOD PRESSURE: 48 MMHG | SYSTOLIC BLOOD PRESSURE: 90 MMHG

## 2020-08-25 VITALS — SYSTOLIC BLOOD PRESSURE: 94 MMHG | DIASTOLIC BLOOD PRESSURE: 50 MMHG

## 2020-08-25 VITALS — DIASTOLIC BLOOD PRESSURE: 60 MMHG | SYSTOLIC BLOOD PRESSURE: 98 MMHG

## 2020-08-25 VITALS — DIASTOLIC BLOOD PRESSURE: 50 MMHG | SYSTOLIC BLOOD PRESSURE: 95 MMHG

## 2020-08-25 VITALS — DIASTOLIC BLOOD PRESSURE: 63 MMHG | SYSTOLIC BLOOD PRESSURE: 101 MMHG

## 2020-08-25 VITALS — DIASTOLIC BLOOD PRESSURE: 55 MMHG | SYSTOLIC BLOOD PRESSURE: 109 MMHG

## 2020-08-25 LAB
ADD MANUAL DIFF: NO
ALBUMIN SERPL-MCNC: 2 G/DL (ref 3.4–5)
ALBUMIN/GLOB SERPL: 0.4 {RATIO} (ref 1–2.7)
ALP SERPL-CCNC: 56 U/L (ref 46–116)
ALT SERPL-CCNC: 10 U/L (ref 12–78)
ANION GAP SERPL CALC-SCNC: 6 MMOL/L (ref 5–15)
AST SERPL-CCNC: 28 U/L (ref 15–37)
BASOPHILS NFR BLD AUTO: 1.1 % (ref 0–2)
BILIRUB SERPL-MCNC: 0.4 MG/DL (ref 0.2–1)
BUN SERPL-MCNC: 11 MG/DL (ref 7–18)
CALCIUM SERPL-MCNC: 9 MG/DL (ref 8.5–10.1)
CHLORIDE SERPL-SCNC: 104 MMOL/L (ref 98–107)
CO2 SERPL-SCNC: 30 MMOL/L (ref 21–32)
CREAT SERPL-MCNC: 1.3 MG/DL (ref 0.55–1.3)
EOSINOPHIL NFR BLD AUTO: 0.4 % (ref 0–3)
ERYTHROCYTE [DISTWIDTH] IN BLOOD BY AUTOMATED COUNT: 12.4 % (ref 11.6–14.8)
GLOBULIN SER-MCNC: 4.7 G/DL
HCT VFR BLD CALC: 42.2 % (ref 37–47)
HGB BLD-MCNC: 14 G/DL (ref 12–16)
LYMPHOCYTES NFR BLD AUTO: 29.1 % (ref 20–45)
MCV RBC AUTO: 104 FL (ref 80–99)
MONOCYTES NFR BLD AUTO: 13.9 % (ref 1–10)
NEUTROPHILS NFR BLD AUTO: 55.4 % (ref 45–75)
PLATELET # BLD: 264 K/UL (ref 150–450)
POTASSIUM SERPL-SCNC: 4.7 MMOL/L (ref 3.5–5.1)
RBC # BLD AUTO: 4.06 M/UL (ref 4.2–5.4)
SODIUM SERPL-SCNC: 140 MMOL/L (ref 136–145)
WBC # BLD AUTO: 12.7 K/UL (ref 4.8–10.8)

## 2020-08-25 RX ADMIN — HEPARIN SODIUM SCH UNITS: 5000 INJECTION INTRAVENOUS; SUBCUTANEOUS at 20:37

## 2020-08-25 RX ADMIN — VALPROIC ACID SCH MG: 250 SOLUTION ORAL at 05:48

## 2020-08-25 RX ADMIN — VALPROIC ACID SCH MG: 250 SOLUTION ORAL at 21:55

## 2020-08-25 RX ADMIN — VALPROIC ACID SCH MG: 250 SOLUTION ORAL at 14:05

## 2020-08-25 RX ADMIN — AZITHROMYCIN DIHYDRATE SCH MLS/HR: 500 INJECTION, POWDER, LYOPHILIZED, FOR SOLUTION INTRAVENOUS at 14:06

## 2020-08-25 RX ADMIN — DEXTROSE MONOHYDRATE SCH MLS/HR: 50 INJECTION, SOLUTION INTRAVENOUS at 05:48

## 2020-08-25 RX ADMIN — HEPARIN SODIUM SCH UNITS: 5000 INJECTION INTRAVENOUS; SUBCUTANEOUS at 08:19

## 2020-08-25 RX ADMIN — PANTOPRAZOLE SODIUM SCH MG: 40 INJECTION, POWDER, FOR SOLUTION INTRAVENOUS at 08:18

## 2020-08-25 NOTE — NUR
NURSE NOTES:

oral suction, and deep suction done. pt is at 89%. called RT due to pt's low O2sat. call 
light within reach. will continue to monitor.

## 2020-08-25 NOTE — NUR
NURSE HAND-OFF REPORT: 



Important Events on Shift:pt desat ranges from 80-88%

Patient Status: full code, 

Diet: tube feeding 



Pending Orders: bIPAP

Pending Results/Labs:none

Pending MD notification:BIPAP, from Dr. Cherry



Latest Vital Signs: Temperature 97.5 , Pulse 70 , B/P 124 /60 , Respiratory Rate 23 , O2 SAT 
100 , Mechanical Ventilator, O2 Flow Rate 30.0 .  

Vital Sign Comment: low O2sat 



EKG Rhythm: Sinus Rhythm

Rhythm change?: N 

MD Notified?: -

MD Response: 



Latest Momin Fall Score: 35  

Fall Risk: Medium Risk 

Safety Measures: Call light Within Reach, Bed Alarm Zone 2, Side Rails Side Rails x3, Bed 
position Low and Locked.

Fall Precautions: 

Yellow Socks

Yellow Gown

Door Sign

Patient Fall Education



Report given to Benny TRAYLOR

## 2020-08-25 NOTE — NUR
NURSE NOTES:

RT made aware MD request titrate oxygen. Patient not tolerating venturi mask, back on 
Non-rebreather mask. O2 sat 97%

## 2020-08-25 NOTE — INFECTIOUS DISEASES PROG NOTE
Assessment/Plan








ASSESSMENT:


Afebrile


Mild leukocytosis





Pneumonia.- COVID 19 neg x2


   Acute hypoxic resp filure on VM> NRB 15l 100%


          -COVID Rapid PCR neg 8/23, 8/23


          -8/22 spc x Group G strep





Persistent, high grade bacteremia- r/o MRSA; r/o endocarditis


     -8/22 Bcx 4/4 sets GPC; 8/23 Bcx 3/4 GPC clusters





   R/o probable UTI


          ua/ wbc 10-15, nit neg, leuk +1; ucx Neg





DAVID


 -supratherapeutic vanco levels





-Seizure disorder.


- Hypothyroidism.


- Down syndrome.





History of PEG tube placement.


[facility] resident





PLAN:


1. Switch IV vancomycin #4 to Daptomycin for GPC bacteremia in view of DAVID


    Switch Zosyn #4/5-7 to Ceftriaxone


Zithromax #3/5 for atypical coverage.


    -8/22 SP Cefepime x1, Flagyl x1





2. Monitor CBC.


3. Monitor BMP.


4. Monitor culture


5. Repeat blood culture x2, 2d echo


6. COVID neg x2


7. Monitor chest x-ray.


8. Monitor the patient's clinical course and labs.  Based on those, we


will do further recommendation.





Thank you, Dr. Cherry, for allowing me to participate in the care of


this patient.  I will follow the patient with you at this


hospitalization.





Subjective


Allergies:  


Coded Allergies:  


     No Known Allergies (Unverified , 1/8/19)


afebrile


hypoxic, now on NRB 15L, Fio2 100%


mild leukocytosis


Cr increased; supratherapeutic vanco levels


remains bacteremic





Objective





Last 24 Hour Vital Signs








  Date Time  Temp Pulse Resp B/P (MAP) Pulse Ox O2 Delivery O2 Flow Rate FiO2


 


8/25/20 08:30  58      


 


8/25/20 08:00       15.0 100


 


8/25/20 08:00      Non-Rebreather 15.0 


 


8/25/20 08:00 98.1 72 20 94/50 (65) 99   


 


8/25/20 07:10     97 Non-Rebreather 15.0 100


 


8/25/20 07:08  84 20  97 Non-Rebreather 15.0 100


 


8/25/20 04:00       15.0 100


 


8/25/20 04:00 98.0 93 21 101/63 (76) 95   


 


8/25/20 04:00      Non-Rebreather 15.0 


 


8/25/20 03:35  75      


 


8/25/20 00:00      Non-Rebreather 15.0 


 


8/25/20 00:00 98.5 90 21 98/60 (73) 94   


 


8/24/20 23:33  79      


 


8/24/20 20:20     92 Venturi Mask 15.0 55


 


8/24/20 20:19  82 20  92 Venturi Mask 15.0 55


 


8/24/20 20:00      Venturi Mask 15.0 


 


8/24/20 20:00 98.0 90 23 108/59 (75) 90   


 


8/24/20 20:00        50


 


8/24/20 20:00  90      


 


8/24/20 16:00  90      


 


8/24/20 16:00 97.9 92 23 122/67 (85) 97   


 


8/24/20 16:00      Venturi Mask 15.0 


 


8/24/20 16:00        50








Height (Feet):  5


Height (Inches):  3.00


Weight (Pounds):  145


HEENT:  No pale conjunctivae.  No icterus.


NECK:  No lymphadenopathy.


CHEST:  Coarse breathing sounds.


HEART:  S1 and S2.


ABDOMEN:  Soft.  PEG tube in place.


EXTREMITIES:  No cyanosis at this time .





Microbiology








 Date/Time


Source Procedure


Growth Status


 


 


 8/23/20 17:30


Blood Blood Culture - Preliminary Resulted


 


 8/23/20 17:15


Blood Blood Culture - Preliminary Resulted


 


 8/23/20 14:10


Nasopharynx SARS-CoV-2 RdRp Gene Assay - Final Complete





 8/22/20 17:29


Sputum Gram Stain - Final Complete


 


 8/22/20 17:29 Sputum Culture - Final


Streptococcus Group G


Usual Respiratory Estrella Complete











Laboratory Tests








Test


  8/24/20


21:34 8/24/20


23:00 8/25/20


04:55 8/25/20


06:14


 


Arterial Blood pH


  7.480


(7.350-7.450) 


  


  7.400


(7.350-7.450)


 


Arterial Blood Partial


Pressure CO2 39.6 mmHg


(35.0-45.0) 


  


  42.0 mmHg


(35.0-45.0)


 


Arterial Blood Partial


Pressure O2 48.6 mmHg


(75.0-100.0) 


  


  47.3 mmHg


(75.0-100.0)


 


Arterial Blood HCO3


  28.8 mmol/L


(22.0-26.0)  H 


  


  25.4 mmol/L


(22.0-26.0)


 


Arterial Blood Oxygen


Saturation 86.5 %


()  *L 


  


  84.0 %


()  *L


 


Arterial Blood Base Excess 5.0 (-2-2)  H   0.5 (-2-2)  


 


Cole Test Positive     Positive  


 


Vancomycin Level Trough


  


  35.3 ug/mL


(5.0-12.0)  H 


  


 


 


White Blood Count


  


  


  12.7 K/UL


(4.8-10.8)  H 


 


 


Red Blood Count


  


  


  4.06 M/UL


(4.20-5.40)  L 


 


 


Hemoglobin


  


  


  14.0 G/DL


(12.0-16.0) 


 


 


Hematocrit


  


  


  42.2 %


(37.0-47.0) 


 


 


Mean Corpuscular Volume


  


  


  104 FL (80-99)


H 


 


 


Mean Corpuscular Hemoglobin


  


  


  34.4 PG


(27.0-31.0)  H 


 


 


Mean Corpuscular Hemoglobin


Concent 


  


  33.1 G/DL


(32.0-36.0) 


 


 


Red Cell Distribution Width


  


  


  12.4 %


(11.6-14.8) 


 


 


Platelet Count


  


  


  264 K/UL


(150-450) 


 


 


Mean Platelet Volume


  


  


  6.7 FL


(6.5-10.1) 


 


 


Neutrophils (%) (Auto)


  


  


  55.4 %


(45.0-75.0) 


 


 


Lymphocytes (%) (Auto)


  


  


  29.1 %


(20.0-45.0) 


 


 


Monocytes (%) (Auto)


  


  


  13.9 %


(1.0-10.0)  H 


 


 


Eosinophils (%) (Auto)


  


  


  0.4 %


(0.0-3.0) 


 


 


Basophils (%) (Auto)


  


  


  1.1 %


(0.0-2.0) 


 


 


Sodium Level


  


  


  140 MMOL/L


(136-145) 


 


 


Potassium Level


  


  


  4.7 MMOL/L


(3.5-5.1) 


 


 


Chloride Level


  


  


  104 MMOL/L


() 


 


 


Carbon Dioxide Level


  


  


  30 MMOL/L


(21-32) 


 


 


Anion Gap


  


  


  6 mmol/L


(5-15) 


 


 


Blood Urea Nitrogen


  


  


  11 mg/dL


(7-18) 


 


 


Creatinine


  


  


  1.3 MG/DL


(0.55-1.30)  # 


 


 


Estimat Glomerular Filtration


Rate 


  


  42.1 mL/min


(>60) 


 


 


Glucose Level


  


  


  103 MG/DL


() 


 


 


Calcium Level


  


  


  9.0 MG/DL


(8.5-10.1) 


 


 


Total Bilirubin


  


  


  0.4 MG/DL


(0.2-1.0) 


 


 


Aspartate Amino Transf


(AST/SGOT) 


  


  28 U/L (15-37)


  


 


 


Alanine Aminotransferase


(ALT/SGPT) 


  


  10 U/L (12-78)


L 


 


 


Alkaline Phosphatase


  


  


  56 U/L


() 


 


 


Pro-B-Type Natriuretic Peptide


  


  


  1561 pg/mL


(0-125)  H 


 


 


Total Protein


  


  


  6.7 G/DL


(6.4-8.2) 


 


 


Albumin


  


  


  2.0 G/DL


(3.4-5.0)  L 


 


 


Globulin   4.7 g/dL   


 


Albumin/Globulin Ratio


  


  


  0.4 (1.0-2.7)


L 


 











Current Medications








 Medications


  (Trade)  Dose


 Ordered  Sig/Didi


 Route


 PRN Reason  Start Time


 Stop Time Status Last Admin


Dose Admin


 


 Acetaminophen


  (Tylenol)  650 mg  Q4H  PRN


 ORAL


 FEVER  8/22/20 16:15


 9/21/20 16:14   


 


 


 Albuterol/


 Ipratropium


  (Albuterol/


 Ipratropium)  3 ml  Q4H  PRN


 HHN


 Shortness of Breath  8/22/20 16:15


 8/27/20 16:14  8/23/20 16:59


 


 


 Azithromycin 500


 mg/Dextrose  275 ml @ 


 275 mls/hr  Q24HRS


 IV


   8/23/20 14:00


 8/29/20 14:59  8/24/20 14:26


 


 


 Baclofen


  (Lioresal)  10 mg  Q8HR


 GT


   8/22/20 22:00


 9/21/20 21:59  8/25/20 05:48


 


 


 Dextrose


  (Dextrose 50%)  25 ml  Q30M  PRN


 IV


 Hypoglycemia  8/22/20 16:15


 11/20/20 16:14   


 


 


 Dextrose


  (Dextrose 50%)  50 ml  Q30M  PRN


 IV


 Hypoglycemia  8/22/20 16:15


 11/20/20 16:14   


 


 


 Heparin Sodium


  (Porcine)


  (Heparin 5000


 units/ml)  5,000 units  EVERY 12  HOURS


 SUBQ


   8/22/20 21:00


 10/6/20 20:59  8/23/20 21:36


 


 


 Levothyroxine


 Sodium


  (Synthroid)  75 mcg  DAILY


 GT


   8/23/20 09:00


 9/22/20 08:59  8/25/20 08:18


 


 


 Ondansetron HCl


  (Zofran)  4 mg  Q6H  PRN


 IVP


 Nausea & Vomiting  8/22/20 16:15


 9/21/20 16:14   


 


 


 Pantoprazole


  (Protonix)  40 mg  DAILY


 IV


   8/23/20 09:00


 9/22/20 08:59  8/25/20 08:18


 


 


 Piperacillin Sod/


 Tazobactam Sod


 3.375 gm/Sodium


 Chloride  110 ml @ 


 220 mls/hr  EVERY 8  HOURS


 IVPB


   8/22/20 22:00


 8/29/20 21:59  8/25/20 05:48


 


 


 Polyethylene


 Glycol


  (Miralax)  17 gm  DAILYPRN  PRN


 ORAL


 Constipation  8/22/20 16:15


 9/21/20 16:14   


 


 


 Temazepam


  (Restoril)  15 mg  HSPRN  PRN


 ORAL


 Insomnia  8/22/20 16:15


 8/29/20 16:14   


 


 


 Valproic Acid


  (Depakene)  500 mg  EVERY 8  HOURS


 GT


   8/22/20 22:00


 9/21/20 21:59  8/25/20 05:48


 


 


 Vancomycin HCl 1


 gm/Dextrose  275 ml @ 


 184 mls/hr  Q12H


 IVPB


   8/25/20 11:00


 8/30/20 10:59  8/25/20 11:09


 

















Rema Gomes M.D. Aug 25, 2020 12:37

## 2020-08-25 NOTE — NUR
NURSE NOTES:

changed to non-breather mask due to desating. pt O2sat is at 95%. will continue to monitor 
pt with plan of care.

## 2020-08-25 NOTE — PULMONOLOGY PROGRESS NOTE
Subjective


ROS Limited/Unobtainable:  No


Constitutional:  Reports: no symptoms


HEENT:  Repors: no symptoms


Respiratory:  Reports: no symptoms


Allergies:  


Coded Allergies:  


     No Known Allergies (Unverified , 1/8/19)





Objective





Last 24 Hour Vital Signs








  Date Time  Temp Pulse Resp B/P (MAP) Pulse Ox O2 Delivery O2 Flow Rate FiO2


 


8/25/20 08:30  58      


 


8/25/20 08:00       15.0 100


 


8/25/20 08:00      Non-Rebreather 15.0 


 


8/25/20 08:00 98.1 72 20 94/50 (65) 99   


 


8/25/20 07:10     97 Non-Rebreather 15.0 100


 


8/25/20 07:08  84 20  97 Non-Rebreather 15.0 100


 


8/25/20 04:00       15.0 100


 


8/25/20 04:00 98.0 93 21 101/63 (76) 95   


 


8/25/20 04:00      Non-Rebreather 15.0 


 


8/25/20 03:35  75      


 


8/25/20 00:00      Non-Rebreather 15.0 


 


8/25/20 00:00 98.5 90 21 98/60 (73) 94   


 


8/24/20 23:33  79      


 


8/24/20 20:20     92 Venturi Mask 15.0 55


 


8/24/20 20:19  82 20  92 Venturi Mask 15.0 55


 


8/24/20 20:00      Venturi Mask 15.0 


 


8/24/20 20:00 98.0 90 23 108/59 (75) 90   


 


8/24/20 20:00        50


 


8/24/20 20:00  90      


 


8/24/20 16:00  90      


 


8/24/20 16:00 97.9 92 23 122/67 (85) 97   


 


8/24/20 16:00      Venturi Mask 15.0 


 


8/24/20 16:00        50


 


8/24/20 12:00      Venturi Mask 5.0 


 


8/24/20 12:00 97.9 91 20 127/53 (77) 94   


 


8/24/20 12:00        50


 


8/24/20 12:00  89      

















Intake and Output  


 


 8/24/20 8/25/20





 19:00 07:00


 


Intake Total 330 ml 650 ml


 


Output Total 850 ml 800 ml


 


Balance -520 ml -150 ml


 


  


 


Free Water  100 ml


 


IV Total  220 ml


 


Tube Feeding 330 ml 330 ml


 


Output Urine Total 700 ml 800 ml


 


Stool Total 150 ml 








General Appearance:  WD/WN


HEENT:  normocephalic, atraumatic


Respiratory:  chest wall non-tender, lungs clear


Cardiovascular:  normal peripheral pulses, normal rate


Abdomen:  normal bowel sounds, rebound tenderness


Genitourinary:  normal external genitalia


Extremities:  no cyanosis


Skin:  no rash


Neurologic:  CNs II-XII grossly normal





Microbiology








 Date/Time


Source Procedure


Growth Status


 


 


 8/23/20 17:30


Blood Blood Culture - Preliminary Resulted


 


 8/23/20 17:15


Blood Blood Culture - Preliminary Resulted


 


 8/22/20 11:40


Blood Blood Culture - Preliminary


Gram Positive Cocci Resulted


 


 8/22/20 11:40


Blood Blood Culture - Preliminary Resulted


 


 8/23/20 14:10


Nasopharynx SARS-CoV-2 RdRp Gene Assay - Final Complete





 8/22/20 17:29


Sputum Gram Stain - Final Complete


 


 8/22/20 17:29 Sputum Culture - Final


Streptococcus Group G


Usual Respiratory Estrella Complete


 


 8/22/20 11:40


Nasopharynx SARS-CoV-2 RdRp Gene Assay - Final Complete





 8/22/20 11:40


Nasal Nares - Final Complete


 


 8/22/20 11:40


Nasal Nares - Final Complete


 


 8/22/20 11:50


Urine,Clean Catch Urine Culture - Final


NO GROWTH AFTER 48 HOURS Complete








Laboratory Tests


8/24/20 21:34: 


Arterial Blood pH 7.480H, Arterial Blood Partial Pressure CO2 39.6, Arterial 

Blood Partial Pressure O2 48.6*L, Arterial Blood HCO3 28.8H, Arterial Blood 

Oxygen Saturation 86.5*L, Arterial Blood Base Excess 5.0H, Cole Test Positive


8/24/20 23:00: Vancomycin Level Trough 35.3H


8/25/20 04:55: 


White Blood Count 12.7H, Red Blood Count 4.06L, Hemoglobin 14.0, Hematocrit 42.2

, Mean Corpuscular Volume 104H, Mean Corpuscular Hemoglobin 34.4H, Mean 

Corpuscular Hemoglobin Concent 33.1, Red Cell Distribution Width 12.4, Platelet 

Count 264, Mean Platelet Volume 6.7, Neutrophils (%) (Auto) 55.4, Lymphocytes (%

) (Auto) 29.1, Monocytes (%) (Auto) 13.9H, Eosinophils (%) (Auto) 0.4, 

Basophils (%) (Auto) 1.1, Sodium Level 140, Potassium Level 4.7, Chloride Level 

104, Carbon Dioxide Level 30, Anion Gap 6, Blood Urea Nitrogen 11, Creatinine 

1.3#, Estimat Glomerular Filtration Rate 42.1, Glucose Level 103, Calcium Level 

9.0, Total Bilirubin 0.4, Aspartate Amino Transf (AST/SGOT) 28, Alanine 

Aminotransferase (ALT/SGPT) 10L, Alkaline Phosphatase 56, Pro-B-Type 

Natriuretic Peptide 1561H, Total Protein 6.7, Albumin 2.0L, Globulin 4.7, 

Albumin/Globulin Ratio 0.4L


8/25/20 06:14: 


Arterial Blood pH 7.400, Arterial Blood Partial Pressure CO2 42.0, Arterial 

Blood Partial Pressure O2 47.3*L, Arterial Blood HCO3 25.4, Arterial Blood 

Oxygen Saturation 84.0*L, Arterial Blood Base Excess 0.5, Cole Test Positive





Current Medications








 Medications


  (Trade)  Dose


 Ordered  Sig/Didi


 Route


 PRN Reason  Start Time


 Stop Time Status Last Admin


Dose Admin


 


 Acetaminophen


  (Tylenol)  650 mg  Q4H  PRN


 ORAL


 FEVER  8/22/20 16:15


 9/21/20 16:14   


 


 


 Albuterol/


 Ipratropium


  (Albuterol/


 Ipratropium)  3 ml  Q4H  PRN


 HHN


 Shortness of Breath  8/22/20 16:15


 8/27/20 16:14  8/23/20 16:59


 


 


 Azithromycin 500


 mg/Dextrose  275 ml @ 


 275 mls/hr  Q24HRS


 IV


   8/23/20 14:00


 8/29/20 14:59  8/24/20 14:26


 


 


 Baclofen


  (Lioresal)  10 mg  Q8HR


 GT


   8/22/20 22:00


 9/21/20 21:59  8/25/20 05:48


 


 


 Dextrose


  (Dextrose 50%)  25 ml  Q30M  PRN


 IV


 Hypoglycemia  8/22/20 16:15


 11/20/20 16:14   


 


 


 Dextrose


  (Dextrose 50%)  50 ml  Q30M  PRN


 IV


 Hypoglycemia  8/22/20 16:15


 11/20/20 16:14   


 


 


 Heparin Sodium


  (Porcine)


  (Heparin 5000


 units/ml)  5,000 units  EVERY 12  HOURS


 SUBQ


   8/22/20 21:00


 10/6/20 20:59  8/23/20 21:36


 


 


 Levothyroxine


 Sodium


  (Synthroid)  75 mcg  DAILY


 GT


   8/23/20 09:00


 9/22/20 08:59  8/25/20 08:18


 


 


 Ondansetron HCl


  (Zofran)  4 mg  Q6H  PRN


 IVP


 Nausea & Vomiting  8/22/20 16:15


 9/21/20 16:14   


 


 


 Pantoprazole


  (Protonix)  40 mg  DAILY


 IV


   8/23/20 09:00


 9/22/20 08:59  8/25/20 08:18


 


 


 Piperacillin Sod/


 Tazobactam Sod


 3.375 gm/Sodium


 Chloride  110 ml @ 


 220 mls/hr  EVERY 8  HOURS


 IVPB


   8/22/20 22:00


 8/29/20 21:59  8/25/20 05:48


 


 


 Polyethylene


 Glycol


  (Miralax)  17 gm  DAILYPRN  PRN


 ORAL


 Constipation  8/22/20 16:15


 9/21/20 16:14   


 


 


 Temazepam


  (Restoril)  15 mg  HSPRN  PRN


 ORAL


 Insomnia  8/22/20 16:15


 8/29/20 16:14   


 


 


 Valproic Acid


  (Depakene)  500 mg  EVERY 8  HOURS


 GT


   8/22/20 22:00


 9/21/20 21:59  8/25/20 05:48


 


 


 Vancomycin HCl 1


 gm/Dextrose  275 ml @ 


 184 mls/hr  Q12H


 IVPB


   8/25/20 11:00


 8/30/20 10:59  8/25/20 11:09


 











Assessment/Plan


Problems:  


(1) Respiratory distress


(2) Seizure disorder


(3) Hypothyroidism


(4) Trisomy 21, Down syndrome


(5) Encephalopathy chronic


Assessment/Plan


on 100% NRM


respiratory treatment


check sputum


iv abx


iv fluids


seizure precaution


pan culture


tolerating gtube feeding


dvt prophylaxis











Chano Cherry MD Aug 25, 2020 11:39

## 2020-08-25 NOTE — NUR
NURSE NOTES:

Patient turned to left side, suction provided, O2 sat 93% at this time, on non-rebreather

## 2020-08-25 NOTE — NUR
NURSE NOTES:

Received report from Arabella Garcia RN. Patient in bed resting, no active s/s cardiac, respiratory 
distress noticed at this time. Patient obtunded, SR with HR 93, on non-rebreather, endorsed 
MD paged regarding further oxygen order. GT on hold due to increase in secretion, GT place 
intact, asymptomatic, patent. IV on left hand 20G, asymptomatic, patent, intact. Bed in 
lowest position, side rails upx3, call light within reach, bed alarm on, Will continue to 
monitor.

## 2020-08-25 NOTE — INTERNAL MED PROGRESS NOTE
Subjective


Date of Service:  Aug 25, 2020


Physician Name


Sanya Schultz


Attending Physician


Chano Cherry MD





Current Medications








 Medications


  (Trade)  Dose


 Ordered  Sig/Didi


 Route


 PRN Reason  Start Time


 Stop Time Status Last Admin


Dose Admin


 


 Acetaminophen


  (Tylenol)  650 mg  Q4H  PRN


 ORAL


 FEVER  8/22/20 16:15


 9/21/20 16:14   


 


 


 Albuterol/


 Ipratropium


  (Albuterol/


 Ipratropium)  3 ml  Q4H  PRN


 HHN


 Shortness of Breath  8/22/20 16:15


 8/27/20 16:14  8/23/20 16:59


 


 


 Azithromycin 500


 mg/Dextrose  275 ml @ 


 275 mls/hr  Q24HRS


 IV


   8/23/20 14:00


 8/29/20 14:59  8/25/20 14:06


 


 


 Baclofen


  (Lioresal)  10 mg  Q8HR


 GT


   8/22/20 22:00


 9/21/20 21:59  8/25/20 14:05


 


 


 Ceftriaxone


 Sodium 1 gm/


 Dextrose  55 ml @ 


 110 mls/hr  Q24H


 IVPB


   8/25/20 13:00


 9/1/20 12:59  8/25/20 13:02


 


 


 Daptomycin 550 mg/


 Sodium Chloride  55 ml @ 


 100 mls/hr  Q24H


 IV


   8/25/20 15:00


 9/1/20 14:59  8/25/20 15:48


 


 


 Dextrose


  (Dextrose 50%)  25 ml  Q30M  PRN


 IV


 Hypoglycemia  8/22/20 16:15


 11/20/20 16:14   


 


 


 Dextrose


  (Dextrose 50%)  50 ml  Q30M  PRN


 IV


 Hypoglycemia  8/22/20 16:15


 11/20/20 16:14   


 


 


 Heparin Sodium


  (Porcine)


  (Heparin 5000


 units/ml)  5,000 units  EVERY 12  HOURS


 SUBQ


   8/22/20 21:00


 10/6/20 20:59  8/23/20 21:36


 


 


 Levothyroxine


 Sodium


  (Synthroid)  75 mcg  DAILY


 GT


   8/23/20 09:00


 9/22/20 08:59  8/25/20 08:18


 


 


 Ondansetron HCl


  (Zofran)  4 mg  Q6H  PRN


 IVP


 Nausea & Vomiting  8/22/20 16:15


 9/21/20 16:14   


 


 


 Pantoprazole


  (Protonix)  40 mg  DAILY


 IV


   8/23/20 09:00


 9/22/20 08:59  8/25/20 08:18


 


 


 Polyethylene


 Glycol


  (Miralax)  17 gm  DAILYPRN  PRN


 ORAL


 Constipation  8/22/20 16:15


 9/21/20 16:14   


 


 


 Temazepam


  (Restoril)  15 mg  HSPRN  PRN


 ORAL


 Insomnia  8/22/20 16:15


 8/29/20 16:14   


 


 


 Valproic Acid


  (Depakene)  500 mg  EVERY 8  HOURS


 GT


   8/22/20 22:00


 9/21/20 21:59  8/25/20 14:05


 








Allergies:  


Coded Allergies:  


     No Known Allergies (Unverified , 1/8/19)


ROS Limited/Unobtainable:  Yes


Subjective


60 YO F with Down's syndrome admitted with hypoxia.  Now pneumonia.  Cover for 

Int Med-DR Hung.  Step Down unit





Objective





Last Vital Signs








  Date Time  Temp Pulse Resp B/P (MAP) Pulse Ox O2 Delivery O2 Flow Rate FiO2


 


8/25/20 16:00  68      


 


8/25/20 16:00       15.0 100


 


8/25/20 16:00      Non-Rebreather  


 


8/25/20 16:00 98.2  22 95/50 (65) 98   








General Appearance:  WD/WN, no apparent distress, alert


EENT:  PERRL/EOMI, normal ENT inspection


Neck:  non-tender, normal alignment, supple, normal inspection


Cardiovascular:  normal peripheral pulses, normal rate, regular rhythm, no 

gallop/murmur, no JVD


Respiratory/Chest:  decreased breath sounds, crackles/rales, rhonchi - 

bilaterally, expiratory wheezing


Abdomen:  normal bowel sounds, non tender, soft, no organomegaly, no mass


Extremities:  normal range of motion


Neurologic:  CNs II-XII grossly normal


Skin:  normal pigmentation, warm/dry





Laboratory Tests








Test


  8/24/20


21:34 8/24/20


23:00 8/25/20


04:55 8/25/20


06:14


 


Arterial Blood pH


  7.480


(7.350-7.450) 


  


  7.400


(7.350-7.450)


 


Arterial Blood Partial


Pressure CO2 39.6 mmHg


(35.0-45.0) 


  


  42.0 mmHg


(35.0-45.0)


 


Arterial Blood Partial


Pressure O2 48.6 mmHg


(75.0-100.0) 


  


  47.3 mmHg


(75.0-100.0)


 


Arterial Blood HCO3


  28.8 mmol/L


(22.0-26.0)  H 


  


  25.4 mmol/L


(22.0-26.0)


 


Arterial Blood Oxygen


Saturation 86.5 %


()  *L 


  


  84.0 %


()  *L


 


Arterial Blood Base Excess 5.0 (-2-2)  H   0.5 (-2-2)  


 


Cole Test Positive     Positive  


 


Vancomycin Level Trough


  


  35.3 ug/mL


(5.0-12.0)  H 


  


 


 


White Blood Count


  


  


  12.7 K/UL


(4.8-10.8)  H 


 


 


Red Blood Count


  


  


  4.06 M/UL


(4.20-5.40)  L 


 


 


Hemoglobin


  


  


  14.0 G/DL


(12.0-16.0) 


 


 


Hematocrit


  


  


  42.2 %


(37.0-47.0) 


 


 


Mean Corpuscular Volume


  


  


  104 FL (80-99)


H 


 


 


Mean Corpuscular Hemoglobin


  


  


  34.4 PG


(27.0-31.0)  H 


 


 


Mean Corpuscular Hemoglobin


Concent 


  


  33.1 G/DL


(32.0-36.0) 


 


 


Red Cell Distribution Width


  


  


  12.4 %


(11.6-14.8) 


 


 


Platelet Count


  


  


  264 K/UL


(150-450) 


 


 


Mean Platelet Volume


  


  


  6.7 FL


(6.5-10.1) 


 


 


Neutrophils (%) (Auto)


  


  


  55.4 %


(45.0-75.0) 


 


 


Lymphocytes (%) (Auto)


  


  


  29.1 %


(20.0-45.0) 


 


 


Monocytes (%) (Auto)


  


  


  13.9 %


(1.0-10.0)  H 


 


 


Eosinophils (%) (Auto)


  


  


  0.4 %


(0.0-3.0) 


 


 


Basophils (%) (Auto)


  


  


  1.1 %


(0.0-2.0) 


 


 


Sodium Level


  


  


  140 MMOL/L


(136-145) 


 


 


Potassium Level


  


  


  4.7 MMOL/L


(3.5-5.1) 


 


 


Chloride Level


  


  


  104 MMOL/L


() 


 


 


Carbon Dioxide Level


  


  


  30 MMOL/L


(21-32) 


 


 


Anion Gap


  


  


  6 mmol/L


(5-15) 


 


 


Blood Urea Nitrogen


  


  


  11 mg/dL


(7-18) 


 


 


Creatinine


  


  


  1.3 MG/DL


(0.55-1.30)  # 


 


 


Estimat Glomerular Filtration


Rate 


  


  42.1 mL/min


(>60) 


 


 


Glucose Level


  


  


  103 MG/DL


() 


 


 


Calcium Level


  


  


  9.0 MG/DL


(8.5-10.1) 


 


 


Total Bilirubin


  


  


  0.4 MG/DL


(0.2-1.0) 


 


 


Aspartate Amino Transf


(AST/SGOT) 


  


  28 U/L (15-37)


  


 


 


Alanine Aminotransferase


(ALT/SGPT) 


  


  10 U/L (12-78)


L 


 


 


Alkaline Phosphatase


  


  


  56 U/L


() 


 


 


Pro-B-Type Natriuretic Peptide


  


  


  1561 pg/mL


(0-125)  H 


 


 


Total Protein


  


  


  6.7 G/DL


(6.4-8.2) 


 


 


Albumin


  


  


  2.0 G/DL


(3.4-5.0)  L 


 


 


Globulin   4.7 g/dL   


 


Albumin/Globulin Ratio


  


  


  0.4 (1.0-2.7)


L 


 











Microbiology








 Date/Time


Source Procedure


Growth Status


 


 


 8/23/20 17:30


Blood Blood Culture - Preliminary Resulted


 


 8/23/20 17:15


Blood Blood Culture - Preliminary Resulted


 


 8/23/20 14:10


Nasopharynx SARS-CoV-2 RdRp Gene Assay - Final Complete

















Intake and Output  


 


 8/24/20 8/25/20





 19:00 07:00


 


Intake Total 330 ml 650 ml


 


Output Total 850 ml 800 ml


 


Balance -520 ml -150 ml


 


  


 


Free Water  100 ml


 


IV Total  220 ml


 


Tube Feeding 330 ml 330 ml


 


Output Urine Total 700 ml 800 ml


 


Stool Total 150 ml 











Assessment/Plan


Problem List:  


(1) HCAP (healthcare-associated pneumonia)


Assessment & Plan:  Strep Group G.  Continue daptomycin per ID=Dr Gomes.  

Pulmonary/Critical care=DR Cherry





(2) Sepsis


Assessment & Plan:  Gram pos cocci.  Continue daptomycin and ceftriaxone per ID=

Dr Gomes





(3) Down's syndrome


(4) Dysphagia


Assessment & Plan:  S/P PEG





(5) Seizure disorder


Assessment & Plan:  Continue keppra and depakote





(6) Hypothyroidism


Assessment & Plan:  Continue synthroid














Sanya Schultz MD Aug 25, 2020 18:47

## 2020-08-25 NOTE — NUR
NURSE NOTES:

did not administer Vaco that was scheduled on 0000, due to high level of vanco level. spoke 
with [facility] pharmacist Dawn.

## 2020-08-25 NOTE — NUR
NURSE NOTES:

left voice mail again to Dr. Cherry regarding pt's low O2sat ranges from 81-88% at this 
time. left again regarding ABG result. RT at the bedside. call light within reach. will 
continue to monitor pt. will wait for call back.

## 2020-08-25 NOTE — DIAGNOSTIC IMAGING REPORT
Indication: Dyspnea

 

Technique: One view of the chest

 

Comparison: 8/22/2020

 

Findings: Again demonstrated is decreased lung volume on the right, appearing

progressive since the prior exam. There is interim development of a right pleural

effusion. Left basilar atelectasis ectatic changes are again demonstrated, unchanged.

There is increased hazy opacity at the left lung base, likely reflecting increased

pleural fluid and/or consolidation.

 

Impression: Increased atelectasis of the right lung, since prior exam of 3 days

earlier

 

New or increased right pleural effusion

 

Increased left basilar consolidation and/or pleural fluid

## 2020-08-25 NOTE — HISTORY AND PHYSICAL REPORT
DATE OF ADMISSION:  08/22/2020

CHIEF COMPLAINT:  Patient is a 59-year-old female with history of Down

syndrome who presents with a chief complaint of shortness of breath.



HISTORY OF PRESENT ILLNESS:  Patient is a resident of [facility].  According to staff at [facility], patient began to experience shortness of breath on 08/22/2020.



Patient presented to [facility] emergency room.  Patient was found to be

hypoxic with oxygen saturation down to the 80s.  Patient is admitted with

hypoxia to rule out probable pneumonia.



REVIEW OF SYSTEMS:  Unable to assess secondary to patient's mental

status.



PAST MEDICAL HISTORY:  Significant for:



1. Down syndrome.

2. Seizure disorder.

3. Hypothyroidism.

4. Dysphagia.



PAST SURGICAL HISTORY:  Significant for PEG placement.



CURRENT MEDICATIONS:

1. Vitamin C 500 mg per G-tube daily.

2. Baclofen 10 mg per G-tube q.8h p.r.n.

3. DuoNeb nebulized q.4h. p.r.n.

4. Keppra 750 mg per G-tube twice daily.

5. Levoxyl 0.075 mg per G-tube daily.

6. Depakote 500 mg per G-tube daily.

7. Vitamin D3 1000 units per G-tube daily.



ALLERGIES:  No known drug allergies.



SOCIAL HISTORY:  Patient is single and is a resident of [facility].  Patient denies tobacco or alcohol

use.



PHYSICAL EXAMINATION:

VITAL SIGNS:  Temperature 98.0, respirations 16, pulse 80, blood pressure

108/70.

GENERAL:  Patient is well developed, well nourished female, in no apparent

distress.

HEENT:  Eyes, pupils are equal and responsive to light and accommodation.

Extraocular movements are intact.

NECK:  Supple without lymphadenopathy.

CHEST:  Lungs are clear to auscultation bilaterally without wheezes or

rales.

CARDIOVASCULAR:  Regular rhythm and rate.  S1, S2 are normal without

murmurs, rubs, gallops.

ABDOMEN:  Soft, nontender, nondistended.  Positive bowel sounds.  No

evidence of hepatosplenomegaly.  Currently, no rebound or guarding

noted.

EXTREMITIES:  Negative for clubbing, cyanosis, or edema.

RECTAL/GENITAL:  Not performed.

NEUROLOGIC:  Cranial nerves II through XII are grossly intact without focal

deficits.



LABORATORY STUDIES:  WBC 7.8, hemoglobin 14.7, hematocrit 44.3, platelets

250,000.  Sodium 131, potassium 3.8, chloride 97, CO2 28, BUN 11,

creatinine 0.5, glucose 119.  Chest x-ray revealed bilateral interstitial

opacities consistent with pneumonia.



ASSESSMENT:  This is a 59-year-old white female.



1. Pneumonia.

2. Hypoxia.

3. Down syndrome.

4. Seizure disorder.

5. Hypothyroidism.

6. Dysphagia.



TREATMENT:

1. Pneumonia.  Patient has been placed empirically on daptomycin and

ceftriaxone.  We will follow recommendations of Infectious Disease, Dr. Gomes.

2. Hypoxia secondary to pneumonia as above.

3. Down syndrome.

4. Seizure disorder.  Continue Keppra and Depakote as above.

5. Hypothyroidism.  Continue Synthroid as above.

6. Dysphagia.  Patient is status post PEG placement.









  ______________________________________________

  Sanya Schultz M.D.





DR:  JOYCELYN

D:  08/25/2020 18:44

T:  08/25/2020 19:48

JOB#:  [id number]

CC:

## 2020-08-25 NOTE — NUR
NURSE NOTES:

Dr. Cherry at the nursing station, made aware of result of ABG. Per MD no need to order 
BIPAP at this time, patient comfortable, O2 sat 96%, titrate Oxygen if tolerated.

## 2020-08-25 NOTE — NUR
NURSE HAND-OFF REPORT: 



Important Events on Shift: ADOLPH, Dr. Cherry made aware of  ABG result

Patient Status: stable

Diet: Jevity 1.2 @ 30ml/h



Pending Orders: na

Pending Results/Labs:na

Pending MD notification:adolph



Latest Vital Signs: Temperature 98.2 , Pulse 68 , B/P 95 /50 , Respiratory Rate 22 , O2 SAT 
98 , Non-Rebreather, O2 Flow Rate 15.0 .  

Vital Sign Comment: stable



EKG Rhythm: Sinus Rhythm

Rhythm change?: N 

MD Notified?: -

MD Response: 



Latest Momin Fall Score: 50  

Fall Risk: High Risk 

Safety Measures: Call light Within Reach, Bed Alarm Zone 1, Side Rails Side Rails x3, Bed 
position Low and Locked.

Fall Precautions: 

Yellow Socks

Yellow Gown

Door Sign

Patient Fall Education



Report given to LAYTON Briseno.

## 2020-08-25 NOTE — NUR
NURSE NOTES:

called  Doctor Jo Ann's urgent line to report Pts abnormal blood gas. stated pt is sating 
95% O2 on non re breather. requested if doctor wants any new orders.

## 2020-08-26 VITALS — DIASTOLIC BLOOD PRESSURE: 41 MMHG | SYSTOLIC BLOOD PRESSURE: 98 MMHG

## 2020-08-26 VITALS — SYSTOLIC BLOOD PRESSURE: 81 MMHG | DIASTOLIC BLOOD PRESSURE: 30 MMHG

## 2020-08-26 VITALS — SYSTOLIC BLOOD PRESSURE: 96 MMHG | DIASTOLIC BLOOD PRESSURE: 43 MMHG

## 2020-08-26 VITALS — DIASTOLIC BLOOD PRESSURE: 29 MMHG | SYSTOLIC BLOOD PRESSURE: 75 MMHG

## 2020-08-26 VITALS — DIASTOLIC BLOOD PRESSURE: 33 MMHG | SYSTOLIC BLOOD PRESSURE: 70 MMHG

## 2020-08-26 VITALS — SYSTOLIC BLOOD PRESSURE: 84 MMHG | DIASTOLIC BLOOD PRESSURE: 31 MMHG

## 2020-08-26 VITALS — SYSTOLIC BLOOD PRESSURE: 81 MMHG | DIASTOLIC BLOOD PRESSURE: 29 MMHG

## 2020-08-26 VITALS — SYSTOLIC BLOOD PRESSURE: 79 MMHG | DIASTOLIC BLOOD PRESSURE: 42 MMHG

## 2020-08-26 VITALS — SYSTOLIC BLOOD PRESSURE: 84 MMHG | DIASTOLIC BLOOD PRESSURE: 32 MMHG

## 2020-08-26 VITALS — DIASTOLIC BLOOD PRESSURE: 31 MMHG | SYSTOLIC BLOOD PRESSURE: 86 MMHG

## 2020-08-26 VITALS — SYSTOLIC BLOOD PRESSURE: 86 MMHG | DIASTOLIC BLOOD PRESSURE: 28 MMHG

## 2020-08-26 VITALS — SYSTOLIC BLOOD PRESSURE: 78 MMHG | DIASTOLIC BLOOD PRESSURE: 29 MMHG

## 2020-08-26 VITALS — SYSTOLIC BLOOD PRESSURE: 100 MMHG | DIASTOLIC BLOOD PRESSURE: 44 MMHG

## 2020-08-26 VITALS — DIASTOLIC BLOOD PRESSURE: 27 MMHG | SYSTOLIC BLOOD PRESSURE: 76 MMHG

## 2020-08-26 VITALS — SYSTOLIC BLOOD PRESSURE: 110 MMHG | DIASTOLIC BLOOD PRESSURE: 94 MMHG

## 2020-08-26 VITALS — SYSTOLIC BLOOD PRESSURE: 78 MMHG | DIASTOLIC BLOOD PRESSURE: 31 MMHG

## 2020-08-26 VITALS — SYSTOLIC BLOOD PRESSURE: 99 MMHG | DIASTOLIC BLOOD PRESSURE: 34 MMHG

## 2020-08-26 VITALS — SYSTOLIC BLOOD PRESSURE: 87 MMHG | DIASTOLIC BLOOD PRESSURE: 31 MMHG

## 2020-08-26 VITALS — DIASTOLIC BLOOD PRESSURE: 32 MMHG | SYSTOLIC BLOOD PRESSURE: 84 MMHG

## 2020-08-26 VITALS — SYSTOLIC BLOOD PRESSURE: 79 MMHG | DIASTOLIC BLOOD PRESSURE: 29 MMHG

## 2020-08-26 VITALS — SYSTOLIC BLOOD PRESSURE: 83 MMHG | DIASTOLIC BLOOD PRESSURE: 28 MMHG

## 2020-08-26 VITALS — DIASTOLIC BLOOD PRESSURE: 26 MMHG | SYSTOLIC BLOOD PRESSURE: 80 MMHG

## 2020-08-26 VITALS — SYSTOLIC BLOOD PRESSURE: 95 MMHG | DIASTOLIC BLOOD PRESSURE: 33 MMHG

## 2020-08-26 VITALS — SYSTOLIC BLOOD PRESSURE: 86 MMHG | DIASTOLIC BLOOD PRESSURE: 27 MMHG

## 2020-08-26 VITALS — SYSTOLIC BLOOD PRESSURE: 94 MMHG | DIASTOLIC BLOOD PRESSURE: 44 MMHG

## 2020-08-26 VITALS — SYSTOLIC BLOOD PRESSURE: 84 MMHG | DIASTOLIC BLOOD PRESSURE: 26 MMHG

## 2020-08-26 VITALS — DIASTOLIC BLOOD PRESSURE: 36 MMHG | SYSTOLIC BLOOD PRESSURE: 93 MMHG

## 2020-08-26 VITALS — SYSTOLIC BLOOD PRESSURE: 139 MMHG | DIASTOLIC BLOOD PRESSURE: 69 MMHG

## 2020-08-26 VITALS — SYSTOLIC BLOOD PRESSURE: 80 MMHG | DIASTOLIC BLOOD PRESSURE: 25 MMHG

## 2020-08-26 VITALS — SYSTOLIC BLOOD PRESSURE: 109 MMHG | DIASTOLIC BLOOD PRESSURE: 62 MMHG

## 2020-08-26 LAB
ADD MANUAL DIFF: NO
ALBUMIN SERPL-MCNC: 1.5 G/DL (ref 3.4–5)
ALBUMIN/GLOB SERPL: 0.3 {RATIO} (ref 1–2.7)
ALP SERPL-CCNC: 44 U/L (ref 46–116)
ALT SERPL-CCNC: 8 U/L (ref 12–78)
ANION GAP SERPL CALC-SCNC: 8 MMOL/L (ref 5–15)
APPEARANCE UR: CLEAR
APTT PPP: YELLOW S
AST SERPL-CCNC: 29 U/L (ref 15–37)
BASOPHILS NFR BLD AUTO: 1.3 % (ref 0–2)
BILIRUB SERPL-MCNC: 0.3 MG/DL (ref 0.2–1)
BUN SERPL-MCNC: 20 MG/DL (ref 7–18)
CALCIUM SERPL-MCNC: 8.6 MG/DL (ref 8.5–10.1)
CHLORIDE SERPL-SCNC: 107 MMOL/L (ref 98–107)
CK SERPL-CCNC: 51 U/L (ref 26–308)
CO2 SERPL-SCNC: 26 MMOL/L (ref 21–32)
CREAT SERPL-MCNC: 1.8 MG/DL (ref 0.55–1.3)
EOSINOPHIL NFR BLD AUTO: 0.6 % (ref 0–3)
ERYTHROCYTE [DISTWIDTH] IN BLOOD BY AUTOMATED COUNT: 12.6 % (ref 11.6–14.8)
GLOBULIN SER-MCNC: 4.4 G/DL
GLUCOSE UR STRIP-MCNC: NEGATIVE MG/DL
HCT VFR BLD CALC: 34.4 % (ref 37–47)
HGB BLD-MCNC: 11.5 G/DL (ref 12–16)
KETONES UR QL STRIP: NEGATIVE
LEUKOCYTE ESTERASE UR QL STRIP: NEGATIVE
LYMPHOCYTES NFR BLD AUTO: 12.7 % (ref 20–45)
MCV RBC AUTO: 104 FL (ref 80–99)
MONOCYTES NFR BLD AUTO: 10.1 % (ref 1–10)
NEUTROPHILS NFR BLD AUTO: 75.3 % (ref 45–75)
NITRITE UR QL STRIP: NEGATIVE
PH UR STRIP: 5 [PH] (ref 4.5–8)
PLATELET # BLD: 223 K/UL (ref 150–450)
POTASSIUM SERPL-SCNC: 4.1 MMOL/L (ref 3.5–5.1)
PROT UR QL STRIP: NEGATIVE
RBC # BLD AUTO: 3.3 M/UL (ref 4.2–5.4)
SODIUM SERPL-SCNC: 141 MMOL/L (ref 136–145)
SP GR UR STRIP: 1.01 (ref 1–1.03)
UROBILINOGEN UR-MCNC: NORMAL MG/DL (ref 0–1)
WBC # BLD AUTO: 13.7 K/UL (ref 4.8–10.8)

## 2020-08-26 RX ADMIN — HEPARIN SODIUM SCH UNITS: 5000 INJECTION INTRAVENOUS; SUBCUTANEOUS at 21:00

## 2020-08-26 RX ADMIN — MEROPENEM SCH MLS/HR: 1 INJECTION INTRAVENOUS at 16:59

## 2020-08-26 RX ADMIN — CHLORHEXIDINE GLUCONATE SCH APPLIC: 213 SOLUTION TOPICAL at 19:38

## 2020-08-26 RX ADMIN — HEPARIN SODIUM SCH UNITS: 5000 INJECTION INTRAVENOUS; SUBCUTANEOUS at 09:00

## 2020-08-26 RX ADMIN — PANTOPRAZOLE SODIUM SCH MG: 40 INJECTION, POWDER, FOR SOLUTION INTRAVENOUS at 08:57

## 2020-08-26 RX ADMIN — VALPROIC ACID SCH MG: 250 SOLUTION ORAL at 06:01

## 2020-08-26 RX ADMIN — VALPROIC ACID SCH MG: 250 SOLUTION ORAL at 14:00

## 2020-08-26 RX ADMIN — VALPROIC ACID SCH MG: 250 SOLUTION ORAL at 22:00

## 2020-08-26 RX ADMIN — AZITHROMYCIN DIHYDRATE SCH MLS/HR: 500 INJECTION, POWDER, LYOPHILIZED, FOR SOLUTION INTRAVENOUS at 14:00

## 2020-08-26 NOTE — NUR
NURSE NOTES:

Transfered from YANCY and received from Benny TRAYLOR. Patient stable AOx0 with no s/sx of pain or 
distress. NSR on cardiac monitor. Normal cardiac sounds. Rhoncus BL on inspiration and 
expiration with mild wheeze on expiration. RR even and unlabored on NRB. VSS. NPO. Feeding 
on hold Valle noted draining well to gravity. IV line flushed and patent. Side rails upx2, 
call light within reach, bed low and locked. Will contine to monitor. Oral care performed.

## 2020-08-26 NOTE — NUR
NURSE HAND-OFF REPORT: 



Important Events on Shift:[CONTACTING DOCTOR ON SHIFT.]

Patient Status: [STABLE.]

Diet: [AS DOCTOR ORDERED]



Pending Orders: [AWAITING DOCTOR CALL BACK]

Pending Results/Labs:[AWAITING DOCTOR CALL BACK]

Pending MD notification:[AWAITING DOCTOR CALL BACK]



Latest Vital Signs: Temperature 98.1 , Pulse 79 , B/P 139 /69 , Respiratory Rate 20 , O2 SAT 
95 , Non-Rebreather, O2 Flow Rate 15.0 .  

Vital Sign Comment: [STABLE]



EKG Rhythm: SR with BBB

Rhythm change?: N 

MD Notified?: -

MD Response: 



Latest Momin Fall Score: 50  

Fall Risk: High Risk 

Safety Measures: Call light Within Reach, Bed Alarm Zone 1, Side Rails Side Rails x3, Bed 
position Low and Locked.

Fall Precautions: 

Yellow Socks

Yellow Gown

Door Sign

Patient Fall Education



Report given to [URSULA TRAYLOR].

## 2020-08-26 NOTE — NUR
NURSE NOTES:WOUND CARE FOLLOW-UP NOTES: Marathon Liquid Skin Protectant x1 application 
applied to moisture intertrigo abdominal folds.

## 2020-08-26 NOTE — NUR
NURSE NOTES:

Per Dr. Cherry, 500ml bolus once time, total of 1L NS bolus given. Order noted, entered, 
carried out.

## 2020-08-26 NOTE — NUR
CASE MANAGEMENT: REVIEW



08/26/20



SI: PNA . DOWN SYNDROME . ENCEPHALOPATHY . SEIZURE DISORDER

98.3   80   25   79/42   92% NON-REBREATHER MASK FiO2 100

WBC 13.7   BUN/CREAT 20/1.8   BNP 1184  ALB 1.5 

ABG: pCO2 52.3   pO2 52.2  HCO3 30.9  O2 SAT 87.1



IS: IV ALBUMIN HUMAN BOLUS 

IVF NS BOLUS X2

VANCOMYCIN IV Q8HR

AZITHROMYCIN IV Q24HR

PROTONIX IV QD

VALPROIC ACID GT Q8HR

ZOSYN IV Q8HR

HEPARIN SUBQ Q12HR



\**: TRANSFER TO ICU; ABG 02 SAT 87.1% 

DCP: PATIENT IS FROM [facility]

-------------------------------------------------------------------------------

Addendum: 08/26/20 at 1150 by RAGINI BRICE LVN

-------------------------------------------------------------------------------

CODE BLUE AT 0730

## 2020-08-26 NOTE — NUR
NURSE NOTES:



Wound pictures taken and uploaded. Wound care nurse applied Methasone cream on bilateral 
inguinal area. Dressing applied on bilateral feet and heels as per wound protocol. Order for 
podiatry received, noted and carried out.

## 2020-08-26 NOTE — NUR
NURSE NOTES:

Patient stable resting comfortably. No s/sx of distress at this time. Medications given at 
this time as scheduled. Will continue to monitor.

## 2020-08-26 NOTE — NUR
NURSE NOTES:



Order for PICC line from Dr Cherry received, noted, and carried out. Notified Radiology and 
PICC line team.

## 2020-08-26 NOTE — NUR
NURSE NOTES:



Called and left a message to Dr Cherry regarding low BP 72/28. Awaiting call back for new 
orders.

## 2020-08-26 NOTE — NUR
TRANSFER TO FLOOR:

Patient transferred to ICU, per Dr. Cherry.   Report given to MiYeon, RN.  No Belongings 
and medications given to Miyeon, RN. Family and or S/O informed of transfer.

## 2020-08-26 NOTE — NUR
NURSE NOTES:

Attempted to obtain consent for PICC line from NOK but no answer. Guardian Kaylan Jo 
unavailable and next officer available unable to give us consent and will contact her 
supervisor via email. Dr. Cherry contacted.

## 2020-08-26 NOTE — CONSULTATION
Consult Note


Consult Note


I am asked to evaluate the patient at the request of Dr Cherry for renal 

failure


Patient seen in room 239.  RN present.  Patient had a rapid response this 

morning.  Due to low blood pressure.


Serum creatinine today has risen to 1.8.


This is her fourth day of hospitalization at [facility].


Patient is on nonrebreather mask.


Patient examined, data reviewed





Patient is a 59-year-old female presenting from nursing home after decreased 

oxygen saturation beginning earlier in the day.  Prior history of Down 

syndrome.  Patient had been nonverbal at baseline.  Reportedly had diminished 

oxygen since earlier in the day.  No response to albuterol.  Had been increased 

rales per EMS.  She was started on supplemental oxygen via nonrebreather.  

Reportedly had negative coronavirus testing recently.





No Known Allergies (Unverified , 1/8/19)





COVID-19 Screening


Contact w/high risk pt:  No


Experienced COVID-19 symptoms?:  No


COVID-19 Testing performed PTA:  Yes


COVID-19 Screening:  Negative COVID-19


COVID-19 Testing Source:  08/10/20








Past Medical History:  see triage record, seizures, other - hypothyroid


Past Surgical History:  other - gtube


Reviewed Nursing Documentation:  PMH: Agreed; PSxH: Agreed








Past Medical History:  No History, Except For


Hx Cardiac Problems:  No - PVD


Hx Diabetes:  No - Hypothyroid


Hx Gastrointestinal Problems:  No - gastrostomy


Hx Neurological Problems:  Yes - down syndrome


Hx Seizures:  Yes


Hx Speech Problem:  Yes








System review:  limited - Limited by poor historian





PHYSICAL EXAM:


VITAL SIGNS:  Temperature 97.7, pulse 86, respiratory rate 18, and blood


pressure 100/62.


HEENT:  No pale conjunctivae.  No icterus.


NECK:  No lymphadenopathy.


CHEST:  Coarse breathing sounds.


HEART:  S1 and S2.


ABDOMEN:  Soft.  PEG tube in place.


EXTREMITIES:  No cyanosis at this time.


NEUROLOGIC:  Awake.





LABS:  White blood cells 7.5, hemoglobin 13, platelets 206.  UA


unremarkable.  BUN 11, creatinine 0.1.  ALT, AST, alkaline phosphatase


unremarkable.  Sputum culture pending.  SARS COVID x 1 negative.  Blood


culture x1 was growing gram positive cocci in clusters.  Chest x-ray,


patchy bilateral interstitial opacities.





.





Assessment/Plan








Impression:


Acute renal failure, likely due to hypotension


Acute respiratory distress, hypoxia


Seizure disorder


Hypothyroidism


Down syndrome


Full code











SUggestions:


Fluid challenge with IV fluids and albumin


Midodrine for BP above 100 systolic


Check TSH level


Check


Correct level


Monitor renal parameters


Urine studies


Per orders








Patient had 2 previous admissions here at [facility]. I spent an additional 36 minutes 

on review of medical records including prior hospital records,consult notes, 

progress notes, procedures ,imaging labs, hemodynamics, and other clinical 

documentation. Over 35 min














Lam Nino MD Aug 26, 2020 09:56

## 2020-08-26 NOTE — NUR
RD ASSESSMENT & RECOMMENDATIONS

SEE CARE ACTIVITY FOR COMPLETE ASSESSMENT



DAILY ESTIMATED NEEDS:

Needs based on TF PTA, pulmonary 50kg abw  

22-27  kcals/kg 

5167-8937  total kcals

1.25-1.5  g protein/kg

63-75  g total protein 

25-30  mL/kg

8317-1117  total fluid mLs



NUTRITION DIAGNOSIS:

Swallowing difficulty r/t dysphagia as evidenced by pt w/ Downs Syndrome,

PEG dep for all nutritional needs.



CURRENT TF: Jevity 1.2 @30ml/hr- ON HOLD  



ENTERAL NUTRITION RECOMMENDATIONS:

JEVITY 1.2 @50ml/hr x22 hrs + Prosource x1 daily   

to provide 1100ml, 1320 kcal, 61g + 11g prot, 888ml free H2O  



- As medically able, restart Jevity 1.2 @20ml/hr, advance as tolerated to

goal.

- Hold TF 1 hr before and after synthroid meds

- Flush per MD, HOB over 30 degrees.

-> Rec trophic feeds if not hemodynamically stable.





ADDITIONAL RECOMMENDATIONS:

1) Ht of 61 inches per SNF; recalibrate bed scale for accurate CBW  

2) Monitor BG, lytes, and tolerance TF  

3) Weekly weights on Calibrated bed scale  

4) F/up with WC eval: add DERICK in 4oz H2O BID via GT w/ TF orders 

5) feed w/ stability, currently s/p Code, increased secretion today

## 2020-08-26 NOTE — NUR
NURSE NOTES:

called doctor Phyllis urgent line. left a message reading pts current ABGs. awaiting call 
back.

## 2020-08-26 NOTE — NUR
NURSE NOTES:

Received report from LAYTON Briseno. Patient in bed, de sat 70%, deep suction provided, O2 sat 
92%, patient does not open eyes at this time. BP   HR 80 checked 63/25. GT feeding on hold 
due to change in condition. Valle Catheter draining well to gravity. On non-rebreather 15L, 
labored breathing. IV on right and left hand 22G, asymptomatic, patent, intact. Bed in 
lowest position, side rails upx3, call light within reach, bed alarm on, Will continue to 
monitor.

## 2020-08-26 NOTE — INTERNAL MED PROGRESS NOTE
Subjective


Date of Service:  Aug 26, 2020


Physician Name


Sanya Schultz


Attending Physician


Chano Cherry MD





Current Medications








 Medications


  (Trade)  Dose


 Ordered  Sig/Didi


 Route


 PRN Reason  Start Time


 Stop Time Status Last Admin


Dose Admin


 


 Acetaminophen


  (Tylenol)  650 mg  Q4H  PRN


 GT


 FEVER  8/26/20 11:45


 9/25/20 11:44   


 


 


 Albuterol/


 Ipratropium


  (Albuterol/


 Ipratropium)  3 ml  Q4H  PRN


 HHN


 Shortness of Breath  8/26/20 12:00


 8/27/20 11:59   


 


 


 Azithromycin 500


 mg/Dextrose  275 ml @ 


 275 mls/hr  Q24HRS


 IV


   8/26/20 14:00


 8/29/20 14:59  8/26/20 14:00


 


 


 Baclofen


  (Lioresal)  10 mg  Q8HR


 GT


   8/26/20 14:00


 9/21/20 21:59  8/26/20 14:00


 


 


 Chlorhexidine


 Gluconate


  (Beatrice-Hex 2%)  1 applic  DAILY@2000


 TOPIC


   8/26/20 20:00


 11/24/20 19:59   


 


 


 Daptomycin 550 mg/


 Sodium Chloride  55 ml @ 


 100 mls/hr  Q24H


 IV


   8/26/20 15:00


 9/1/20 14:59  8/26/20 15:01


 


 


 Dextrose


  (Dextrose 50%)  25 ml  Q30M  PRN


 IV


 Hypoglycemia  8/26/20 11:30


 11/20/20 11:29   


 


 


 Dextrose


  (Dextrose 50%)  50 ml  Q30M  PRN


 IV


 Hypoglycemia  8/26/20 11:30


 11/20/20 11:29   


 


 


 Heparin Sodium


  (Porcine)


  (Heparin 5000


 units/ml)  5,000 units  EVERY 12  HOURS


 SUBQ


   8/26/20 21:00


 10/6/20 20:59   


 


 


 Heparin Sodium/


 Sodium Chloride


  (Heparin 1000


 units/500ml


 Premix)  1,000 unit  ONCE  PRN


 IV


 radiology  8/26/20 16:15


 8/28/20 18:00   


 


 


 Levothyroxine


 Sodium


  (Synthroid)  75 mcg  DAILY


 GT


   8/27/20 09:00


 9/22/20 08:59   


 


 


 Lidocaine HCl


  (Xylocaine 1%


 30ml)  30 ml  ONCE  PRN


 INJ


 radioloty  8/26/20 16:15


 8/28/20 18:00   


 


 


 Meropenem 1 gm/


 Sodium Chloride  55 ml @ 


 110 mls/hr  Q12H


 IVPB


   8/26/20 16:00


 8/31/20 15:59  8/26/20 16:59


 


 


 Midodrine


  (Pro-Amatine)  2.5 mg  Q6HR


 GT


   8/26/20 12:00


 11/24/20 11:59  8/26/20 16:59


 


 


 Ondansetron HCl


  (Zofran)  4 mg  Q6H  PRN


 IVP


 Nausea & Vomiting  8/26/20 12:00


 9/21/20 11:59   


 


 


 Pantoprazole


  (Protonix)  40 mg  DAILY


 IV


   8/27/20 09:00


 9/22/20 08:59   


 


 


 Polyethylene


 Glycol


  (Miralax)  17 gm  DAILYPRN  PRN


 GT


 Constipation  8/26/20 12:00


 9/21/20 11:59   


 


 


 Temazepam


  (Restoril)  15 mg  HSPRN  PRN


 GT


 Insomnia  8/26/20 21:00


 8/29/20 20:59   


 


 


 Valproic Acid


  (Depakene)  500 mg  EVERY 8  HOURS


 GT


   8/26/20 14:00


 9/21/20 21:59  8/26/20 14:00


 








Allergies:  


Coded Allergies:  


     No Known Allergies (Unverified , 1/8/19)


ROS Limited/Unobtainable:  Yes


Subjective


60 YO F with Down's syndrome admitted with hypoxia.  Now sepsis and pneumonia.  

Cover for Int Med-DR Hung.  ICU.  Non rebreather mask





Objective





Last Vital Signs








  Date Time  Temp Pulse Resp B/P (MAP) Pulse Ox O2 Delivery O2 Flow Rate FiO2


 


8/26/20 18:00  71 15 84/26 (45) 99   


 


8/26/20 16:00 97.7       


 


8/26/20 16:00      Non-Rebreather 15.0 


 


8/26/20 07:00        100











Laboratory Tests








Test


  8/26/20


00:02 8/26/20


04:45 8/26/20


08:03 8/26/20


13:25


 


Arterial Blood pH


  7.390


(7.350-7.450) 


  


  


 


 


Arterial Blood Partial


Pressure CO2 52.3 mmHg


(35.0-45.0)  H 


  


  


 


 


Arterial Blood Partial


Pressure O2 52.2 mmHg


(75.0-100.0)  L 


  


  


 


 


Arterial Blood HCO3


  30.9 mmol/L


(22.0-26.0)  H 


  


  


 


 


Arterial Blood Oxygen


Saturation 87.1 %


()  *L 


  


  


 


 


Arterial Blood Base Excess 4.9 (-2-2)  H   


 


Cole Test Positive     


 


White Blood Count


  


  13.7 K/UL


(4.8-10.8)  H 


  


 


 


Red Blood Count


  


  3.30 M/UL


(4.20-5.40)  L 


  


 


 


Hemoglobin


  


  11.5 G/DL


(12.0-16.0)  L 


  


 


 


Hematocrit


  


  34.4 %


(37.0-47.0)  L 


  


 


 


Mean Corpuscular Volume


  


  104 FL (80-99)


H 


  


 


 


Mean Corpuscular Hemoglobin


  


  34.7 PG


(27.0-31.0)  H 


  


 


 


Mean Corpuscular Hemoglobin


Concent 


  33.3 G/DL


(32.0-36.0) 


  


 


 


Red Cell Distribution Width


  


  12.6 %


(11.6-14.8) 


  


 


 


Platelet Count


  


  223 K/UL


(150-450) 


  


 


 


Mean Platelet Volume


  


  6.6 FL


(6.5-10.1) 


  


 


 


Neutrophils (%) (Auto)


  


  75.3 %


(45.0-75.0)  H 


  


 


 


Lymphocytes (%) (Auto)


  


  12.7 %


(20.0-45.0)  L 


  


 


 


Monocytes (%) (Auto)


  


  10.1 %


(1.0-10.0)  H 


  


 


 


Eosinophils (%) (Auto)


  


  0.6 %


(0.0-3.0) 


  


 


 


Basophils (%) (Auto)


  


  1.3 %


(0.0-2.0) 


  


 


 


Sodium Level


  


  141 MMOL/L


(136-145) 


  


 


 


Potassium Level


  


  4.1 MMOL/L


(3.5-5.1) 


  


 


 


Chloride Level


  


  107 MMOL/L


() 


  


 


 


Carbon Dioxide Level


  


  26 MMOL/L


(21-32) 


  


 


 


Anion Gap


  


  8 mmol/L


(5-15) 


  


 


 


Blood Urea Nitrogen


  


  20 mg/dL


(7-18)  H 


  


 


 


Creatinine


  


  1.8 MG/DL


(0.55-1.30)  H 


  


 


 


Estimat Glomerular Filtration


Rate 


  28.9 mL/min


(>60) 


  


 


 


Glucose Level


  


  99 MG/DL


() 


  


 


 


Calcium Level


  


  8.6 MG/DL


(8.5-10.1) 


  


 


 


Total Bilirubin


  


  0.3 MG/DL


(0.2-1.0) 


  


 


 


Aspartate Amino Transf


(AST/SGOT) 


  29 U/L (15-37)


  


  


 


 


Alanine Aminotransferase


(ALT/SGPT) 


  8 U/L (12-78)


L 


  


 


 


Alkaline Phosphatase


  


  44 U/L


()  L 


  


 


 


Total Creatine Kinase


  


  51 U/L


() 


  


 


 


Pro-B-Type Natriuretic Peptide


  


  1184 pg/mL


(0-125)  H 


  


 


 


Total Protein


  


  5.9 G/DL


(6.4-8.2)  L 


  


 


 


Albumin


  


  1.5 G/DL


(3.4-5.0)  L 


  


 


 


Globulin  4.4 g/dL    


 


Albumin/Globulin Ratio


  


  0.3 (1.0-2.7)


L 


  


 


 


Thyroid Stimulating Hormone


(TSH) 


  1.309 uiU/mL


(0.358-3.740) 


  


 


 


Hepatitis B Surface Antibody,


Quant 


  Pending  


  


  


 


 


Hepatitis C Antibody  Pending    


 


HIV (1&2) Antibody Rapid


  


  Negative


(NEGATIVE) 


  


 


 


POC Whole Blood Glucose


  


  


  129 MG/DL


()  H 


 


 


Urine Color    Yellow  


 


Urine Appearance    Clear  


 


Urine pH    5.0 (4.5-8.0)  


 


Urine Specific Gravity


  


  


  


  1.015


(1.005-1.035)


 


Urine Protein


  


  


  


  Negative


(NEGATIVE)


 


Urine Glucose (UA)


  


  


  


  Negative


(NEGATIVE)


 


Urine Ketones


  


  


  


  Negative


(NEGATIVE)


 


Urine Blood


  


  


  


  4+ (NEGATIVE)


H


 


Urine Nitrite


  


  


  


  Negative


(NEGATIVE)


 


Urine Bilirubin


  


  


  


  Negative


(NEGATIVE)


 


Urine Urobilinogen


  


  


  


  Normal MG/DL


(0.0-1.0)


 


Urine Leukocyte Esterase


  


  


  


  Negative


(NEGATIVE)


 


Urine RBC


  


  


  


  20-30 /HPF (0


- 2)  H


 


Urine WBC


  


  


  


  0-2 /HPF (0 -


2)


 


Urine Squamous Epithelial


Cells 


  


  


  Occasional


/LPF


 


Urine Bacteria


  


  


  


  Occasional


/HPF (NONE)


 


Urine Random Sodium


  


  


  


  < 20 mmol/L


()  L











Microbiology








 Date/Time


Source Procedure


Growth Status


 


 


 8/26/20 13:20


Nasopharynx SARS-CoV-2 RdRp Gene Assay - Final Complete

















Intake and Output  


 


 8/25/20 8/26/20





 19:00 07:00


 


Intake Total 1000 ml 330 ml


 


Output Total 350 ml 700 ml


 


Balance 650 ml -370 ml


 


  


 


Free Water 100 ml 


 


IV Total 660 ml 


 


Tube Feeding 240 ml 330 ml


 


Output Urine Total 350 ml 700 ml


 


# Bowel Movements 2 1








Objective


General Appearance:  WD/WN, no apparent distress, alert


EENT:  PERRL/EOMI, normal ENT inspection


Neck:  non-tender, normal alignment, supple, normal inspection


Cardiovascular:  normal peripheral pulses, normal rate, regular rhythm, no 

gallop/murmur, no JVD


Respiratory/Chest:  Non rebreather mask; decreased breath sounds, crackles/rales

, rhonchi - bilaterally, expiratory wheezing


Abdomen:  normal bowel sounds, non tender, soft, no organomegaly, no mass


Extremities:  normal range of motion


Neurologic:  CNs II-XII grossly normal


Skin:  normal pigmentation, warm/dry





Assessment/Plan


Problem List:  


(1) HCAP (healthcare-associated pneumonia)


Assessment & Plan:  Strep Group G.  Continue daptomycin per ID=Dr Gomes.  

Pulmonary/Critical care=DR Cherry





(2) Sepsis


Assessment & Plan:  Staph haemolyticus.  Continue daptomycin and ceftriaxone 

per ID=Dr Gomes





(3) Down's syndrome


(4) Dysphagia


Assessment & Plan:  S/P PEG





(5) Seizure disorder


Assessment & Plan:  Continue keppra and depakote





(6) Hypothyroidism


Assessment & Plan:  Continue synthroid





(7) Acute respiratory failure


Assessment & Plan:  Pulmonary = Dr Cherry; non rebreather mask














Sanya Schultz MD Aug 26, 2020 18:44

## 2020-08-26 NOTE — NUR
NURSE NOTES:

Patient on monitor, SR 84, BP 83/51, HR  84 O2 93%, open eyes when turned, received NS bolus

## 2020-08-26 NOTE — NUR
NURSE NOTES:

pt vital signs are currently /54, HR 79, O2 sat 96% on non rebreather, respirations 
20. will continue to monitor.

## 2020-08-26 NOTE — NUR
NURSE NOTES:

PATIENT LETHARGIC, RESPIRATION REGULAR, TACHYPNEA O2 SATURATION 89% NOTED ON NRB 15LPM, NO 
N/V OR COUGH, KEPT ELEVATED RIGHT CHEST AS ORDER, ABDOMEN SOFT, G TUBE INTACT AND PATENT, 
KEPT NPO AS ORDER, KEPT ASPIRATION PRECAUTION, F/C INTACT AND PATENT, YELLOW URINE OUTED, 
MADE LOWER BED POSITION, ON BED ALARM AND LOCKED, WILL CONTINUE TO MONITOR.

## 2020-08-26 NOTE — PULMONOLGY CRITICAL CARE NOTE
Critical Care - Asmt/Plan


Problems:  


(1) Acute respiratory failure


(2) Pneumonia


(3) Sepsis


(4) HCAP (healthcare-associated pneumonia)


(5) Seizure disorder


(6) Down's syndrome


Assessment/Plan:


pt transferred to ICU for hypotension and desaturation.


Respiratory:  monitor respiratory rate, adjust FIO2, CXR


Cardiac:  continue pressors, continue to monitor HR/BP


Renal:  F/U I&O, keep IV fluid, check electrolytes


Infectious Disease:  check cultures, continue antibiotics


Gastrointestinal:  continue feedings/current rate


Endocrine:  monitor blood sugar, continue sliding scale insulin


Hematologic:  transfuse if hgb<8.5


Neurologic:  PRN Ativan, keep patient comfortable


Affect:  PRN ativan


Time Spent (Minutes):  40


Notes Reviewed:  cardio, renal


Discussed with:  nurses, consultants, 





Critical Care - Objective





Last 24 Hour Vital Signs








  Date Time  Temp Pulse Resp B/P (MAP) Pulse Ox O2 Delivery O2 Flow Rate FiO2


 


8/26/20 10:00      Non-Rebreather 15.0 


 


8/26/20 09:24  69      


 


8/26/20 08:00      Non-Rebreather 15.0 


 


8/26/20 08:00       15.0 


 


8/26/20 08:00 98.3 80 25 79/42 (54) 92   


 


8/26/20 07:00     92 Non-Rebreather 15.0 100


 


8/26/20 07:00  78 18  92 Non-Rebreather 15.0 100


 


8/26/20 04:00 98.1 79 20 139/69 (92) 95   


 


8/26/20 04:00       15.0 


 


8/26/20 04:00      Non-Rebreather 15.0 


 


8/26/20 03:52  67      


 


8/26/20 00:00      Non-Rebreather 15.0 


 


8/26/20 00:00 98.1 75 20 109/62 (78) 95   


 


8/26/20 00:00       15.0 


 


8/25/20 23:28  76      


 


8/25/20 20:00      Non-Rebreather 15.0 


 


8/25/20 20:00  67      


 


8/25/20 20:00       15.0 


 


8/25/20 20:00 97.9 74 20 90/48 (62) 98   


 


8/25/20 19:37     95 Non-Rebreather 15.0 100


 


8/25/20 19:36  65 18  95 Non-Rebreather 15.0 100


 


8/25/20 16:00  68      


 


8/25/20 16:00       15.0 100


 


8/25/20 16:00      Non-Rebreather 15.0 


 


8/25/20 16:00 98.2 75 22 95/50 (65) 98   


 


8/25/20 12:00      Non-Rebreather 15.0 


 


8/25/20 12:00       15.0 100


 


8/25/20 12:00 97.3 75 21 109/55 (73) 96   


 


8/25/20 12:00  73      








Status:  awake


Condition:  critical


HEENT:  atraumatic, normocephalic


Lungs:  clear


Heart:  HR/BP stable, HR/BP unstable


Abdomen:  soft, active bowel sounds


Extremities:  no C/C/E


Micro:





Microbiology








 Date/Time


Source Procedure


Growth Status


 


 


 8/23/20 17:30


Blood Blood Culture - Preliminary


Gram Positive Cocci Resulted


 


 8/23/20 17:15


Blood Blood Culture - Preliminary Resulted


 


 8/23/20 14:10


Nasopharynx SARS-CoV-2 RdRp Gene Assay - Final Complete








Accucheck:  131





Critical Care - Subjective


FI02:  100


Sputum Amount:  Large


Tube Feeding Amount:  30


I&O:











Intake and Output  


 


 8/25/20 8/26/20





 19:00 07:00


 


Intake Total 1000 ml 330 ml


 


Output Total 350 ml 700 ml


 


Balance 650 ml -370 ml


 


  


 


Free Water 100 ml 


 


IV Total 660 ml 


 


Tube Feeding 240 ml 330 ml


 


Output Urine Total 350 ml 700 ml


 


# Bowel Movements 2 1








CXR:


Increased atelectasis of the right lung, since prior exam of 3 days earlier.  

New or increased right pleural effusion


Labs:





Laboratory Tests








Test


  8/25/20


15:10 8/26/20


00:02 8/26/20


04:45 8/26/20


08:03


 


Stool Occult Blood


  Positive


(NEGATIVE) 


  


  


 


 


Arterial Blood pH


  


  7.390


(7.350-7.450) 


  


 


 


Arterial Blood Partial


Pressure CO2 


  52.3 mmHg


(35.0-45.0)  H 


  


 


 


Arterial Blood Partial


Pressure O2 


  52.2 mmHg


(75.0-100.0)  L 


  


 


 


Arterial Blood HCO3


  


  30.9 mmol/L


(22.0-26.0)  H 


  


 


 


Arterial Blood Oxygen


Saturation 


  87.1 %


()  *L 


  


 


 


Arterial Blood Base Excess  4.9 (-2-2)  H  


 


Cole Test  Positive    


 


White Blood Count


  


  


  13.7 K/UL


(4.8-10.8)  H 


 


 


Red Blood Count


  


  


  3.30 M/UL


(4.20-5.40)  L 


 


 


Hemoglobin


  


  


  11.5 G/DL


(12.0-16.0)  L 


 


 


Hematocrit


  


  


  34.4 %


(37.0-47.0)  L 


 


 


Mean Corpuscular Volume


  


  


  104 FL (80-99)


H 


 


 


Mean Corpuscular Hemoglobin


  


  


  34.7 PG


(27.0-31.0)  H 


 


 


Mean Corpuscular Hemoglobin


Concent 


  


  33.3 G/DL


(32.0-36.0) 


 


 


Red Cell Distribution Width


  


  


  12.6 %


(11.6-14.8) 


 


 


Platelet Count


  


  


  223 K/UL


(150-450) 


 


 


Mean Platelet Volume


  


  


  6.6 FL


(6.5-10.1) 


 


 


Neutrophils (%) (Auto)


  


  


  75.3 %


(45.0-75.0)  H 


 


 


Lymphocytes (%) (Auto)


  


  


  12.7 %


(20.0-45.0)  L 


 


 


Monocytes (%) (Auto)


  


  


  10.1 %


(1.0-10.0)  H 


 


 


Eosinophils (%) (Auto)


  


  


  0.6 %


(0.0-3.0) 


 


 


Basophils (%) (Auto)


  


  


  1.3 %


(0.0-2.0) 


 


 


Sodium Level


  


  


  141 MMOL/L


(136-145) 


 


 


Potassium Level


  


  


  4.1 MMOL/L


(3.5-5.1) 


 


 


Chloride Level


  


  


  107 MMOL/L


() 


 


 


Carbon Dioxide Level


  


  


  26 MMOL/L


(21-32) 


 


 


Anion Gap


  


  


  8 mmol/L


(5-15) 


 


 


Blood Urea Nitrogen


  


  


  20 mg/dL


(7-18)  H 


 


 


Creatinine


  


  


  1.8 MG/DL


(0.55-1.30)  H 


 


 


Estimat Glomerular Filtration


Rate 


  


  28.9 mL/min


(>60) 


 


 


Glucose Level


  


  


  99 MG/DL


() 


 


 


Calcium Level


  


  


  8.6 MG/DL


(8.5-10.1) 


 


 


Total Bilirubin


  


  


  0.3 MG/DL


(0.2-1.0) 


 


 


Aspartate Amino Transf


(AST/SGOT) 


  


  29 U/L (15-37)


  


 


 


Alanine Aminotransferase


(ALT/SGPT) 


  


  8 U/L (12-78)


L 


 


 


Alkaline Phosphatase


  


  


  44 U/L


()  L 


 


 


Total Creatine Kinase


  


  


  51 U/L


() 


 


 


Pro-B-Type Natriuretic Peptide


  


  


  1184 pg/mL


(0-125)  H 


 


 


Total Protein


  


  


  5.9 G/DL


(6.4-8.2)  L 


 


 


Albumin


  


  


  1.5 G/DL


(3.4-5.0)  L 


 


 


Globulin   4.4 g/dL   


 


Albumin/Globulin Ratio


  


  


  0.3 (1.0-2.7)


L 


 


 


Thyroid Stimulating Hormone


(TSH) 


  


  1.309 uiU/mL


(0.358-3.740) 


 


 


POC Whole Blood Glucose


  


  


  


  129 MG/DL


()  H

















Chano Cherry MD Aug 26, 2020 11:28

## 2020-08-26 NOTE — NUR
CODE BLUE:

See Code sheet which remains on paper.



Patient unresponsive with deep pain stimuli, RRT initially called, patient remained 
unresponsive called CODE BLUE

NSR with HR 60

BP 79/42

Per Dr. Hawk 500ml bolus given

## 2020-08-26 NOTE — NUR
NURSE NOTES:

DESATURATION 75% NOTED, CALLED RT THAT RT CAME SUCTIONED BUT O2 SATURATION BELOW 80% NOTED 
AT THIS TIME, WILL CONTINUE TO MONITOR.

## 2020-08-26 NOTE — NUR
NURSE HAND-OFF REPORT: 



Latest Vital Signs: Temperature 97.7 , Pulse 92 , B/P 84 /32 , Respiratory Rate 18 , O2 SAT 
97 , Non-Rebreather, O2 Flow Rate 15.0 .  

Vital Sign Comment: Hypotensive. MD aware. Currentlly SBP in 80's. O2 sat >90%. 



EKG Rhythm: Sinus Rhythm

Rhythm change?: N 

MD Notified?: -

MD Response: 



Latest Momin Fall Score: 50  

Fall Risk: High Risk 

Safety Measures: Call light Within Reach, Bed Alarm Zone 1, Side Rails Side Rails x3, Bed 
position Low and Locked.

Fall Precautions: 

Yellow Socks

Yellow Gown

Door Sign

Patient Fall Education



Report given to Nirav TRAYLOR. Patient remains guarded. Plan of care endorsed. RN aware that we 
were unable to obtain consent for PICC line.

## 2020-08-26 NOTE — NUR
NURSE NOTES:

BP checked 71/36, awaiting for callback, HR 82 , O3 sat 92% on Non-rebreather mask 15L, 
patient labored breathing, is not responsive to deep pain stimuli.

## 2020-08-26 NOTE — NUR
NURSE NOTES:

 large amount of secretions noted in pts mouth. called Christopher RT to deep suction pt

## 2020-08-26 NOTE — EMERGENCY ROOM REPORT
History of Present Illness


General


Chief Complaint:  Dyspnea/Respdistress


Source:  Patient, Medical Record





Present Illness


Allergies:  


Coded Allergies:  


     No Known Allergies (Unverified , 1/8/19)





COVID-19 Screening


Contact w/high risk pt:  No


Experienced COVID-19 symptoms?:  No


COVID-19 Testing performed PTA:  Yes


COVID-19 Screening:  [facility] COVID-19


COVID-19 Testing Source:  08/10/20





Nursing Documentation-[facility]


Past Medical History:  No History, Except For


Hx Cardiac Problems:  No - PVD


Hx Diabetes:  No - Hypothyroid


Hx Cancer:  No


Hx Gastrointestinal Problems:  No - gastrostomy


Hx Neurological Problems:  Yes - down syndrome


Hx Seizures:  Yes


Hx Speech Problem:  Yes





Physical Exam





Vital Signs








  Date Time  Temp Pulse Resp B/P (MAP) Pulse Ox O2 Delivery O2 Flow Rate FiO2


 


8/22/20 11:20 99.0 80 16 108/70 (83) 94 Non-Rebreather 15.0 


 


8/22/20 20:00        35











Medical Decision Making


Diagnostic Impression:  


 Primary Impression:  


 Pneumonia


 Additional Impressions:  


 Trisomy 21, Down syndrome


 Seizure disorder


ER Course


I was called to evaluate patient after CODE BLUE.  Patient had initially become 

unresponsive.  Had diminished blood pressure.  Patient had been negative for 

coronavirus.  Patient was placed on a cardiac monitor and was noted to have 

narrow complex cardiac rhythm.  Patient had spontaneous respirations and a low 

blood pressure.  Patient started on a fluid bolus.  Further management to be 

per primary care team.





Last Vital Signs








  Date Time  Temp Pulse Resp B/P (MAP) Pulse Ox O2 Delivery O2 Flow Rate FiO2


 


8/26/20 12:00       15.0 


 


8/26/20 10:00      Non-Rebreather  


 


8/26/20 09:24  69      


 


8/26/20 08:00 98.3  25 79/42 (54) 92   


 


8/26/20 07:00        100








Disposition:  ADMITTED AS INPATIENT


Condition:  Stable


Referrals:  


NON PHYSICIAN (PCP)











Sabas Hawk MD Aug 26, 2020 14:03

## 2020-08-26 NOTE — INFECTIOUS DISEASES PROG NOTE
Assessment/Plan








ASSESSMENT:


sp code blue 8/26





Afebrile


Mild leukocytosis; increased





Pneumonia.- COVID 19 neg x2


   Acute hypoxic resp filure on VM> NRB 15l 100%; hypoxic on ABG


             -8/25 CXR: Increased atelectasis of the right lung, since prior 

exam of 3 days earlier. New or increased right pleural effusion. Increased left 

basilar consolidation and/or pleural fluid 


          -COVID Rapid PCR neg 8/23, 8/23


          -8/22 spc x Group G strep


          -8/22 CXR:   Reduced lung volumes.  Patchy bilateral predominantly 

interstitial  pulmonary opacities.  Could be from edema and/or pneumonia.  

There is a broader differential.





Persistent, high grade bacteremia- r/o MRSA; r/o endocarditis


     -8/22 Bcx 4/4 sets S. haemolyticus; 8/23 Bcx 3/4 GPC clusters; 8/25 Bcx p


     -2d echo: no vegetaions seen





   R/o probable UTI


          ua/ wbc 10-15, nit neg, leuk +1; ucx Neg





DAVID; worsening


 -supratherapeutic vanco levels





-Seizure disorder.


- Hypothyroidism.


- Down syndrome.





History of PEG tube placement.


[facility] resident





PLAN:


1. empiric Daptomycin #2 (abx d #5) for GPC bacteremia in view of DAVID


Switch Ceftriaxone #2 (abx d#5) to Meropenem given resp decompensation


Zithromax #4/5 for atypical coverage.


    8/25 SP IV Vancomycin #4, Zosyn #4


    -8/22 SP Cefepime x1, Flagyl x1





2. Monitor CBC.


3. Monitor BMP.


4. Monitor culture


5. Repeat cx


6. COVID neg x2; repeat COVID PCR given ongoing hypoxia


7. Monitor chest x-ray.


8. Monitor the patient's clinical course and labs.  Based on those, we


will do further recommendation.





Thank you, Dr. Cherry, for allowing me to participate in the care of


this patient.  I will follow the patient with you at this


hospitalization.








Discussed with RN.





Subjective


Allergies:  


Coded Allergies:  


     No Known Allergies (Unverified , 1/8/19)


transferred to ICU this morning after patient had code blue-


patient was hypotensive and hypoxic; hypoxic on ABG, currently on %


afebrile


wbc and cr increased


remains bacteremic





Objective





Last 24 Hour Vital Signs








  Date Time  Temp Pulse Resp B/P (MAP) Pulse Ox O2 Delivery O2 Flow Rate FiO2


 


8/26/20 10:00      Non-Rebreather 15.0 


 


8/26/20 09:24  69      


 


8/26/20 08:00      Non-Rebreather 15.0 


 


8/26/20 08:00       15.0 


 


8/26/20 08:00 98.3 80 25 79/42 (54) 92   


 


8/26/20 07:00     92 Non-Rebreather 15.0 100


 


8/26/20 07:00  78 18  92 Non-Rebreather 15.0 100


 


8/26/20 04:00 98.1 79 20 139/69 (92) 95   


 


8/26/20 04:00       15.0 


 


8/26/20 04:00      Non-Rebreather 15.0 


 


8/26/20 03:52  67      


 


8/26/20 00:00      Non-Rebreather 15.0 


 


8/26/20 00:00 98.1 75 20 109/62 (78) 95   


 


8/26/20 00:00       15.0 


 


8/25/20 23:28  76      


 


8/25/20 20:00      Non-Rebreather 15.0 


 


8/25/20 20:00  67      


 


8/25/20 20:00       15.0 


 


8/25/20 20:00 97.9 74 20 90/48 (62) 98   


 


8/25/20 19:37     95 Non-Rebreather 15.0 100


 


8/25/20 19:36  65 18  95 Non-Rebreather 15.0 100


 


8/25/20 16:00  68      


 


8/25/20 16:00       15.0 100


 


8/25/20 16:00      Non-Rebreather 15.0 


 


8/25/20 16:00 98.2 75 22 95/50 (65) 98   








Height (Feet):  5


Height (Inches):  3.00


Weight (Pounds):  145


HEENT:  No pale conjunctivae.  No icterus. NRB in palce


NECK:  No lymphadenopathy.


CHEST:  Coarse breathing sounds.


HEART:  S1 and S2.


ABDOMEN:  Soft.  PEG tube in place.


EXTREMITIES:  No cyanosis at this time .





Microbiology








 Date/Time


Source Procedure


Growth Status


 


 


 8/23/20 17:30


Blood Blood Culture - Preliminary


Gram Positive Cocci Resulted


 


 8/23/20 17:15


Blood Blood Culture - Preliminary Resulted


 


 8/23/20 14:10


Nasopharynx SARS-CoV-2 RdRp Gene Assay - Final Complete











Laboratory Tests








Test


  8/25/20


15:10 8/26/20


00:02 8/26/20


04:45 8/26/20


08:03


 


Stool Occult Blood


  Positive


(NEGATIVE) 


  


  


 


 


Arterial Blood pH


  


  7.390


(7.350-7.450) 


  


 


 


Arterial Blood Partial


Pressure CO2 


  52.3 mmHg


(35.0-45.0)  H 


  


 


 


Arterial Blood Partial


Pressure O2 


  52.2 mmHg


(75.0-100.0)  L 


  


 


 


Arterial Blood HCO3


  


  30.9 mmol/L


(22.0-26.0)  H 


  


 


 


Arterial Blood Oxygen


Saturation 


  87.1 %


()  *L 


  


 


 


Arterial Blood Base Excess  4.9 (-2-2)  H  


 


Cole Test  Positive    


 


White Blood Count


  


  


  13.7 K/UL


(4.8-10.8)  H 


 


 


Red Blood Count


  


  


  3.30 M/UL


(4.20-5.40)  L 


 


 


Hemoglobin


  


  


  11.5 G/DL


(12.0-16.0)  L 


 


 


Hematocrit


  


  


  34.4 %


(37.0-47.0)  L 


 


 


Mean Corpuscular Volume


  


  


  104 FL (80-99)


H 


 


 


Mean Corpuscular Hemoglobin


  


  


  34.7 PG


(27.0-31.0)  H 


 


 


Mean Corpuscular Hemoglobin


Concent 


  


  33.3 G/DL


(32.0-36.0) 


 


 


Red Cell Distribution Width


  


  


  12.6 %


(11.6-14.8) 


 


 


Platelet Count


  


  


  223 K/UL


(150-450) 


 


 


Mean Platelet Volume


  


  


  6.6 FL


(6.5-10.1) 


 


 


Neutrophils (%) (Auto)


  


  


  75.3 %


(45.0-75.0)  H 


 


 


Lymphocytes (%) (Auto)


  


  


  12.7 %


(20.0-45.0)  L 


 


 


Monocytes (%) (Auto)


  


  


  10.1 %


(1.0-10.0)  H 


 


 


Eosinophils (%) (Auto)


  


  


  0.6 %


(0.0-3.0) 


 


 


Basophils (%) (Auto)


  


  


  1.3 %


(0.0-2.0) 


 


 


Sodium Level


  


  


  141 MMOL/L


(136-145) 


 


 


Potassium Level


  


  


  4.1 MMOL/L


(3.5-5.1) 


 


 


Chloride Level


  


  


  107 MMOL/L


() 


 


 


Carbon Dioxide Level


  


  


  26 MMOL/L


(21-32) 


 


 


Anion Gap


  


  


  8 mmol/L


(5-15) 


 


 


Blood Urea Nitrogen


  


  


  20 mg/dL


(7-18)  H 


 


 


Creatinine


  


  


  1.8 MG/DL


(0.55-1.30)  H 


 


 


Estimat Glomerular Filtration


Rate 


  


  28.9 mL/min


(>60) 


 


 


Glucose Level


  


  


  99 MG/DL


() 


 


 


Calcium Level


  


  


  8.6 MG/DL


(8.5-10.1) 


 


 


Total Bilirubin


  


  


  0.3 MG/DL


(0.2-1.0) 


 


 


Aspartate Amino Transf


(AST/SGOT) 


  


  29 U/L (15-37)


  


 


 


Alanine Aminotransferase


(ALT/SGPT) 


  


  8 U/L (12-78)


L 


 


 


Alkaline Phosphatase


  


  


  44 U/L


()  L 


 


 


Total Creatine Kinase


  


  


  51 U/L


() 


 


 


Pro-B-Type Natriuretic Peptide


  


  


  1184 pg/mL


(0-125)  H 


 


 


Total Protein


  


  


  5.9 G/DL


(6.4-8.2)  L 


 


 


Albumin


  


  


  1.5 G/DL


(3.4-5.0)  L 


 


 


Globulin   4.4 g/dL   


 


Albumin/Globulin Ratio


  


  


  0.3 (1.0-2.7)


L 


 


 


Thyroid Stimulating Hormone


(TSH) 


  


  1.309 uiU/mL


(0.358-3.740) 


 


 


POC Whole Blood Glucose


  


  


  


  129 MG/DL


()  H











Current Medications








 Medications


  (Trade)  Dose


 Ordered  Sig/Didi


 Route


 PRN Reason  Start Time


 Stop Time Status Last Admin


Dose Admin


 


 Acetaminophen


  (Tylenol)  650 mg  Q4H  PRN


 GT


 FEVER  8/26/20 11:45


 9/25/20 11:44   


 


 


 Albumin Human  500 ml @ 0


 mls/hr  Q0M


 IV


   8/26/20 11:30


 8/26/20 12:30  8/26/20 11:22


 


 


 Albuterol/


 Ipratropium


  (Albuterol/


 Ipratropium)  3 ml  Q4H  PRN


 HHN


 Shortness of Breath  8/26/20 12:00


 8/27/20 11:59   


 


 


 Azithromycin 500


 mg/Dextrose  275 ml @ 


 275 mls/hr  Q24HRS


 IV


   8/26/20 14:00


 8/29/20 14:59   


 


 


 Baclofen


  (Lioresal)  10 mg  Q8HR


 GT


   8/26/20 14:00


 9/21/20 21:59   


 


 


 Ceftriaxone


 Sodium 1 gm/


 Dextrose  55 ml @ 


 110 mls/hr  Q24H


 IVPB


   8/26/20 13:00


 9/1/20 12:59   


 


 


 Daptomycin 550 mg/


 Sodium Chloride  55 ml @ 


 100 mls/hr  Q24H


 IV


   8/26/20 15:00


 9/1/20 14:59   


 


 


 Dextrose


  (Dextrose 50%)  25 ml  Q30M  PRN


 IV


 Hypoglycemia  8/26/20 11:30


 11/20/20 11:29   


 


 


 Dextrose


  (Dextrose 50%)  50 ml  Q30M  PRN


 IV


 Hypoglycemia  8/26/20 11:30


 11/20/20 11:29   


 


 


 Heparin Sodium


  (Porcine)


  (Heparin 5000


 units/ml)  5,000 units  EVERY 12  HOURS


 SUBQ


   8/26/20 21:00


 10/6/20 20:59   


 


 


 Levothyroxine


 Sodium


  (Synthroid)  75 mcg  DAILY


 GT


   8/27/20 09:00


 9/22/20 08:59   


 


 


 Midodrine


  (Pro-Amatine)  2.5 mg  Q6HR


 GT


   8/26/20 12:00


 11/24/20 11:59  8/26/20 11:46


 


 


 Ondansetron HCl


  (Zofran)  4 mg  Q6H  PRN


 IVP


 Nausea & Vomiting  8/26/20 12:00


 9/21/20 11:59   


 


 


 Pantoprazole


  (Protonix)  40 mg  DAILY


 IV


   8/27/20 09:00


 9/22/20 08:59   


 


 


 Polyethylene


 Glycol


  (Miralax)  17 gm  DAILYPRN  PRN


 GT


 Constipation  8/26/20 12:00


 9/21/20 11:59   


 


 


 Temazepam


  (Restoril)  15 mg  HSPRN  PRN


 GT


 Insomnia  8/26/20 21:00


 8/29/20 20:59   


 


 


 Valproic Acid


  (Depakene)  500 mg  EVERY 8  HOURS


 GT


   8/26/20 14:00


 9/21/20 21:59   


 

















Rema Gomes M.D. Aug 26, 2020 12:46

## 2020-08-26 NOTE — NUR
NURSE NOTES:

CALLED BACK FROM DR. MANZO THAT REPORTED NO PICC THANG, V/S AND PT'S SKIN IS WARM AT THIS 
TIME. MD SAID, " SKIN IS WARM, DON'T WORRY ."

## 2020-08-27 VITALS — DIASTOLIC BLOOD PRESSURE: 37 MMHG | SYSTOLIC BLOOD PRESSURE: 98 MMHG

## 2020-08-27 VITALS — SYSTOLIC BLOOD PRESSURE: 85 MMHG | DIASTOLIC BLOOD PRESSURE: 38 MMHG

## 2020-08-27 VITALS — SYSTOLIC BLOOD PRESSURE: 120 MMHG | DIASTOLIC BLOOD PRESSURE: 55 MMHG

## 2020-08-27 VITALS — DIASTOLIC BLOOD PRESSURE: 49 MMHG | SYSTOLIC BLOOD PRESSURE: 103 MMHG

## 2020-08-27 VITALS — DIASTOLIC BLOOD PRESSURE: 36 MMHG | SYSTOLIC BLOOD PRESSURE: 69 MMHG

## 2020-08-27 VITALS — SYSTOLIC BLOOD PRESSURE: 73 MMHG | DIASTOLIC BLOOD PRESSURE: 35 MMHG

## 2020-08-27 VITALS — DIASTOLIC BLOOD PRESSURE: 48 MMHG | SYSTOLIC BLOOD PRESSURE: 81 MMHG

## 2020-08-27 VITALS — DIASTOLIC BLOOD PRESSURE: 32 MMHG | SYSTOLIC BLOOD PRESSURE: 67 MMHG

## 2020-08-27 VITALS — DIASTOLIC BLOOD PRESSURE: 52 MMHG | SYSTOLIC BLOOD PRESSURE: 98 MMHG

## 2020-08-27 VITALS — DIASTOLIC BLOOD PRESSURE: 38 MMHG | SYSTOLIC BLOOD PRESSURE: 91 MMHG

## 2020-08-27 VITALS — DIASTOLIC BLOOD PRESSURE: 44 MMHG | SYSTOLIC BLOOD PRESSURE: 89 MMHG

## 2020-08-27 VITALS — SYSTOLIC BLOOD PRESSURE: 88 MMHG | DIASTOLIC BLOOD PRESSURE: 46 MMHG

## 2020-08-27 VITALS — SYSTOLIC BLOOD PRESSURE: 73 MMHG | DIASTOLIC BLOOD PRESSURE: 40 MMHG

## 2020-08-27 VITALS — DIASTOLIC BLOOD PRESSURE: 40 MMHG | SYSTOLIC BLOOD PRESSURE: 89 MMHG

## 2020-08-27 VITALS — SYSTOLIC BLOOD PRESSURE: 99 MMHG | DIASTOLIC BLOOD PRESSURE: 46 MMHG

## 2020-08-27 VITALS — SYSTOLIC BLOOD PRESSURE: 64 MMHG | DIASTOLIC BLOOD PRESSURE: 37 MMHG

## 2020-08-27 VITALS — DIASTOLIC BLOOD PRESSURE: 49 MMHG | SYSTOLIC BLOOD PRESSURE: 112 MMHG

## 2020-08-27 VITALS — DIASTOLIC BLOOD PRESSURE: 31 MMHG | SYSTOLIC BLOOD PRESSURE: 83 MMHG

## 2020-08-27 VITALS — SYSTOLIC BLOOD PRESSURE: 90 MMHG | DIASTOLIC BLOOD PRESSURE: 43 MMHG

## 2020-08-27 VITALS — DIASTOLIC BLOOD PRESSURE: 47 MMHG | SYSTOLIC BLOOD PRESSURE: 102 MMHG

## 2020-08-27 VITALS — SYSTOLIC BLOOD PRESSURE: 98 MMHG | DIASTOLIC BLOOD PRESSURE: 44 MMHG

## 2020-08-27 VITALS — SYSTOLIC BLOOD PRESSURE: 89 MMHG | DIASTOLIC BLOOD PRESSURE: 51 MMHG

## 2020-08-27 VITALS — DIASTOLIC BLOOD PRESSURE: 43 MMHG | SYSTOLIC BLOOD PRESSURE: 91 MMHG

## 2020-08-27 VITALS — DIASTOLIC BLOOD PRESSURE: 46 MMHG | SYSTOLIC BLOOD PRESSURE: 84 MMHG

## 2020-08-27 VITALS — DIASTOLIC BLOOD PRESSURE: 45 MMHG | SYSTOLIC BLOOD PRESSURE: 99 MMHG

## 2020-08-27 VITALS — SYSTOLIC BLOOD PRESSURE: 116 MMHG | DIASTOLIC BLOOD PRESSURE: 58 MMHG

## 2020-08-27 VITALS — SYSTOLIC BLOOD PRESSURE: 86 MMHG | DIASTOLIC BLOOD PRESSURE: 37 MMHG

## 2020-08-27 VITALS — SYSTOLIC BLOOD PRESSURE: 98 MMHG | DIASTOLIC BLOOD PRESSURE: 46 MMHG

## 2020-08-27 VITALS — SYSTOLIC BLOOD PRESSURE: 110 MMHG | DIASTOLIC BLOOD PRESSURE: 48 MMHG

## 2020-08-27 VITALS — SYSTOLIC BLOOD PRESSURE: 129 MMHG | DIASTOLIC BLOOD PRESSURE: 59 MMHG

## 2020-08-27 VITALS — SYSTOLIC BLOOD PRESSURE: 101 MMHG | DIASTOLIC BLOOD PRESSURE: 46 MMHG

## 2020-08-27 VITALS — SYSTOLIC BLOOD PRESSURE: 100 MMHG | DIASTOLIC BLOOD PRESSURE: 44 MMHG

## 2020-08-27 VITALS — DIASTOLIC BLOOD PRESSURE: 38 MMHG | SYSTOLIC BLOOD PRESSURE: 93 MMHG

## 2020-08-27 VITALS — DIASTOLIC BLOOD PRESSURE: 42 MMHG | SYSTOLIC BLOOD PRESSURE: 98 MMHG

## 2020-08-27 VITALS — DIASTOLIC BLOOD PRESSURE: 32 MMHG | SYSTOLIC BLOOD PRESSURE: 65 MMHG

## 2020-08-27 VITALS — DIASTOLIC BLOOD PRESSURE: 38 MMHG | SYSTOLIC BLOOD PRESSURE: 85 MMHG

## 2020-08-27 VITALS — DIASTOLIC BLOOD PRESSURE: 47 MMHG | SYSTOLIC BLOOD PRESSURE: 110 MMHG

## 2020-08-27 VITALS — SYSTOLIC BLOOD PRESSURE: 56 MMHG | DIASTOLIC BLOOD PRESSURE: 24 MMHG

## 2020-08-27 VITALS — DIASTOLIC BLOOD PRESSURE: 42 MMHG | SYSTOLIC BLOOD PRESSURE: 104 MMHG

## 2020-08-27 VITALS — DIASTOLIC BLOOD PRESSURE: 41 MMHG | SYSTOLIC BLOOD PRESSURE: 97 MMHG

## 2020-08-27 VITALS — DIASTOLIC BLOOD PRESSURE: 23 MMHG | SYSTOLIC BLOOD PRESSURE: 65 MMHG

## 2020-08-27 VITALS — DIASTOLIC BLOOD PRESSURE: 46 MMHG | SYSTOLIC BLOOD PRESSURE: 99 MMHG

## 2020-08-27 VITALS — DIASTOLIC BLOOD PRESSURE: 37 MMHG | SYSTOLIC BLOOD PRESSURE: 83 MMHG

## 2020-08-27 VITALS — DIASTOLIC BLOOD PRESSURE: 38 MMHG | SYSTOLIC BLOOD PRESSURE: 90 MMHG

## 2020-08-27 VITALS — SYSTOLIC BLOOD PRESSURE: 114 MMHG | DIASTOLIC BLOOD PRESSURE: 46 MMHG

## 2020-08-27 VITALS — SYSTOLIC BLOOD PRESSURE: 89 MMHG | DIASTOLIC BLOOD PRESSURE: 32 MMHG

## 2020-08-27 VITALS — DIASTOLIC BLOOD PRESSURE: 41 MMHG | SYSTOLIC BLOOD PRESSURE: 75 MMHG

## 2020-08-27 VITALS — DIASTOLIC BLOOD PRESSURE: 46 MMHG | SYSTOLIC BLOOD PRESSURE: 98 MMHG

## 2020-08-27 VITALS — SYSTOLIC BLOOD PRESSURE: 94 MMHG | DIASTOLIC BLOOD PRESSURE: 36 MMHG

## 2020-08-27 VITALS — DIASTOLIC BLOOD PRESSURE: 51 MMHG | SYSTOLIC BLOOD PRESSURE: 102 MMHG

## 2020-08-27 VITALS — SYSTOLIC BLOOD PRESSURE: 123 MMHG | DIASTOLIC BLOOD PRESSURE: 86 MMHG

## 2020-08-27 VITALS — DIASTOLIC BLOOD PRESSURE: 43 MMHG | SYSTOLIC BLOOD PRESSURE: 110 MMHG

## 2020-08-27 VITALS — SYSTOLIC BLOOD PRESSURE: 71 MMHG | DIASTOLIC BLOOD PRESSURE: 32 MMHG

## 2020-08-27 VITALS — DIASTOLIC BLOOD PRESSURE: 48 MMHG | SYSTOLIC BLOOD PRESSURE: 102 MMHG

## 2020-08-27 VITALS — SYSTOLIC BLOOD PRESSURE: 82 MMHG | DIASTOLIC BLOOD PRESSURE: 36 MMHG

## 2020-08-27 VITALS — DIASTOLIC BLOOD PRESSURE: 34 MMHG | SYSTOLIC BLOOD PRESSURE: 80 MMHG

## 2020-08-27 VITALS — DIASTOLIC BLOOD PRESSURE: 46 MMHG | SYSTOLIC BLOOD PRESSURE: 104 MMHG

## 2020-08-27 VITALS — DIASTOLIC BLOOD PRESSURE: 36 MMHG | SYSTOLIC BLOOD PRESSURE: 75 MMHG

## 2020-08-27 VITALS — SYSTOLIC BLOOD PRESSURE: 80 MMHG | DIASTOLIC BLOOD PRESSURE: 40 MMHG

## 2020-08-27 VITALS — DIASTOLIC BLOOD PRESSURE: 52 MMHG | SYSTOLIC BLOOD PRESSURE: 109 MMHG

## 2020-08-27 VITALS — DIASTOLIC BLOOD PRESSURE: 85 MMHG | SYSTOLIC BLOOD PRESSURE: 100 MMHG

## 2020-08-27 VITALS — DIASTOLIC BLOOD PRESSURE: 39 MMHG | SYSTOLIC BLOOD PRESSURE: 89 MMHG

## 2020-08-27 VITALS — SYSTOLIC BLOOD PRESSURE: 113 MMHG | DIASTOLIC BLOOD PRESSURE: 62 MMHG

## 2020-08-27 VITALS — SYSTOLIC BLOOD PRESSURE: 116 MMHG | DIASTOLIC BLOOD PRESSURE: 46 MMHG

## 2020-08-27 VITALS — DIASTOLIC BLOOD PRESSURE: 38 MMHG | SYSTOLIC BLOOD PRESSURE: 73 MMHG

## 2020-08-27 VITALS — SYSTOLIC BLOOD PRESSURE: 121 MMHG | DIASTOLIC BLOOD PRESSURE: 78 MMHG

## 2020-08-27 VITALS — SYSTOLIC BLOOD PRESSURE: 64 MMHG | DIASTOLIC BLOOD PRESSURE: 35 MMHG

## 2020-08-27 VITALS — DIASTOLIC BLOOD PRESSURE: 40 MMHG | SYSTOLIC BLOOD PRESSURE: 78 MMHG

## 2020-08-27 VITALS — DIASTOLIC BLOOD PRESSURE: 61 MMHG | SYSTOLIC BLOOD PRESSURE: 98 MMHG

## 2020-08-27 VITALS — SYSTOLIC BLOOD PRESSURE: 63 MMHG | DIASTOLIC BLOOD PRESSURE: 37 MMHG

## 2020-08-27 VITALS — DIASTOLIC BLOOD PRESSURE: 46 MMHG | SYSTOLIC BLOOD PRESSURE: 102 MMHG

## 2020-08-27 VITALS — SYSTOLIC BLOOD PRESSURE: 113 MMHG | DIASTOLIC BLOOD PRESSURE: 47 MMHG

## 2020-08-27 VITALS — SYSTOLIC BLOOD PRESSURE: 125 MMHG | DIASTOLIC BLOOD PRESSURE: 95 MMHG

## 2020-08-27 VITALS — DIASTOLIC BLOOD PRESSURE: 39 MMHG | SYSTOLIC BLOOD PRESSURE: 94 MMHG

## 2020-08-27 VITALS — SYSTOLIC BLOOD PRESSURE: 76 MMHG | DIASTOLIC BLOOD PRESSURE: 45 MMHG

## 2020-08-27 LAB
ADD MANUAL DIFF: YES
ALBUMIN SERPL-MCNC: 1.9 G/DL (ref 3.4–5)
ALBUMIN/GLOB SERPL: 0.5 {RATIO} (ref 1–2.7)
ALP SERPL-CCNC: 39 U/L (ref 46–116)
ALT SERPL-CCNC: 9 U/L (ref 12–78)
ANION GAP SERPL CALC-SCNC: 14 MMOL/L (ref 5–15)
AST SERPL-CCNC: 25 U/L (ref 15–37)
BILIRUB SERPL-MCNC: 0.5 MG/DL (ref 0.2–1)
BUN SERPL-MCNC: 19 MG/DL (ref 7–18)
CALCIUM SERPL-MCNC: 8.2 MG/DL (ref 8.5–10.1)
CHLORIDE SERPL-SCNC: 110 MMOL/L (ref 98–107)
CHOLEST SERPL-MCNC: 81 MG/DL (ref ?–200)
CO2 SERPL-SCNC: 22 MMOL/L (ref 21–32)
CREAT SERPL-MCNC: 2.2 MG/DL (ref 0.55–1.3)
ERYTHROCYTE [DISTWIDTH] IN BLOOD BY AUTOMATED COUNT: 12.8 % (ref 11.6–14.8)
GAMMA GLUTAMYL TRANSPEPTIDASE: 10 U/L (ref 5–85)
GLOBULIN SER-MCNC: 3.7 G/DL
HCT VFR BLD CALC: 33.1 % (ref 37–47)
HDLC SERPL-MCNC: 15 MG/DL (ref 40–60)
HGB BLD-MCNC: 10.9 G/DL (ref 12–16)
LDH SERPL L TO P-CCNC: 247 U/L (ref 81–234)
MCV RBC AUTO: 106 FL (ref 80–99)
PHOSPHATE SERPL-MCNC: 2.7 MG/DL (ref 2.5–4.9)
PLATELET # BLD: 212 K/UL (ref 150–450)
POTASSIUM SERPL-SCNC: 3.4 MMOL/L (ref 3.5–5.1)
RBC # BLD AUTO: 3.13 M/UL (ref 4.2–5.4)
SODIUM SERPL-SCNC: 146 MMOL/L (ref 136–145)
TRIGL SERPL-MCNC: 67 MG/DL (ref 30–150)
WBC # BLD AUTO: 19.8 K/UL (ref 4.8–10.8)

## 2020-08-27 PROCEDURE — 06HN33Z INSERTION OF INFUSION DEVICE INTO LEFT FEMORAL VEIN, PERCUTANEOUS APPROACH: ICD-10-PCS

## 2020-08-27 PROCEDURE — 5A1955Z RESPIRATORY VENTILATION, GREATER THAN 96 CONSECUTIVE HOURS: ICD-10-PCS

## 2020-08-27 PROCEDURE — 0BH17EZ INSERTION OF ENDOTRACHEAL AIRWAY INTO TRACHEA, VIA NATURAL OR ARTIFICIAL OPENING: ICD-10-PCS

## 2020-08-27 RX ADMIN — SODIUM CHLORIDE SCH MLS/HR: 900 INJECTION, SOLUTION INTRAVENOUS at 23:17

## 2020-08-27 RX ADMIN — SODIUM CHLORIDE SCH MLS/HR: 900 INJECTION, SOLUTION INTRAVENOUS at 17:49

## 2020-08-27 RX ADMIN — PANTOPRAZOLE SODIUM SCH MG: 40 INJECTION, POWDER, FOR SOLUTION INTRAVENOUS at 08:43

## 2020-08-27 RX ADMIN — CHLORHEXIDINE GLUCONATE SCH APPLIC: 213 SOLUTION TOPICAL at 20:19

## 2020-08-27 RX ADMIN — HEPARIN SODIUM SCH UNITS: 5000 INJECTION INTRAVENOUS; SUBCUTANEOUS at 08:44

## 2020-08-27 RX ADMIN — VALPROIC ACID SCH MG: 250 SOLUTION ORAL at 14:12

## 2020-08-27 RX ADMIN — VALPROIC ACID SCH MG: 250 SOLUTION ORAL at 05:32

## 2020-08-27 RX ADMIN — VALPROIC ACID SCH MG: 250 SOLUTION ORAL at 21:28

## 2020-08-27 RX ADMIN — HEPARIN SODIUM SCH UNITS: 5000 INJECTION INTRAVENOUS; SUBCUTANEOUS at 20:19

## 2020-08-27 RX ADMIN — MEROPENEM SCH MLS/HR: 1 INJECTION INTRAVENOUS at 02:17

## 2020-08-27 RX ADMIN — ACETAMINOPHEN PRN MG: 160 SOLUTION ORAL at 09:00

## 2020-08-27 RX ADMIN — DEXTROSE MONOHYDRATE SCH MLS/HR: 50 INJECTION, SOLUTION INTRAVENOUS at 14:12

## 2020-08-27 RX ADMIN — SODIUM CHLORIDE SCH MLS/HR: 900 INJECTION, SOLUTION INTRAVENOUS at 10:31

## 2020-08-27 RX ADMIN — MEROPENEM SCH MLS/HR: 1 INJECTION INTRAVENOUS at 15:54

## 2020-08-27 RX ADMIN — AZITHROMYCIN DIHYDRATE SCH MLS/HR: 500 INJECTION, POWDER, LYOPHILIZED, FOR SOLUTION INTRAVENOUS at 14:12

## 2020-08-27 NOTE — NUR
NURSE NOTES:

PATIENT NO RESPONSIVE TI TACTILE STIMULI, ONGOING LEVOPHED 22MCG/MIN AND IV FLUID NS AT 
100ML/HR VIA RIGHT FEMORAL TLC, WILL CONTINUE TO MONITOR.

## 2020-08-27 NOTE — NUR
CASE MANAGEMENT: REVIEW



08/27/20



SI: S/P CODE BLUE 8/26

PNA . DOWN SYNDROME . ENCEPHALOPATHY . SEIZURE DISORDER

99.5   61   20   110/43   95% ENDOTRACHEAL FiO2 70

TROP 0.057  BNP 17779  ALB 1.9  WBC 19.8   K+3.4   CL-110   BUN/CREAT 19/2.2  LACTIC ACID 3 
   

ABG: pH 7.487  pCO2 27.4   pO2 53.3  HCO3 20.3  O2 SAT 90.2

ABG FROM 8/26@2359: pH 7.130  pCO2 77.9  pO2 46  O2 SAT 71.7



IS: LEVOPHED GTT PROTOCOL

IV MEROPENEM BID

IV AZITHROMYCIN QD

IVF NS @100ML/HR

IV SYNTHROID GT QAC

IV DEPAKENE GT TID

PRO-AMATINE GT Q6HR 



\**: ICU 

DCP: PATIENT IS FROM [facility]

PLAN: 

STABLE BP AND RESPIRATORY

## 2020-08-27 NOTE — NUR
NURSE HAND-OFF REPORT: 



Latest Vital Signs: Temperature 99.0 , Pulse 78 , B/P 102 /46 , Respiratory Rate 20 , O2 SAT 
94 , Mechanical Ventilator, O2 Flow Rate 15.0 .  

Vital Sign Comment: 



EKG Rhythm: Sinus Rhythm

Rhythm change?: N 

MD Notified?: -

MD Response: 



Latest Momin Fall Score: 50  

Fall Risk: High Risk 

Safety Measures: Call light Within Reach, Bed Alarm Zone 1, Side Rails Side Rails x3, Bed 
position Low and Locked.

Fall Precautions: 

Yellow Socks

Door Sign

Patient Fall Education



Report given to MIYEON,RN.

## 2020-08-27 NOTE — NUR
RESPIRATORY NOTE:

PT REMAINED STABLE ON CMV WITH CURRENT SETTINGS. SX PRN. AIRWAY IS SECURE AND PATENT. NO S/S 
OF RESPIRATORY DISTRESS NOTED AT THIS TIME.

## 2020-08-27 NOTE — CDS PHYSICIAN QUERY
Clarification is required for compliance, coding accuracy, and to reflect 
severity of illness for this patient





Dear Dr. Schultz            Date: 8/27/20



/CDS Name: Prince Nolasco   



Exercise your independent professional judgment when responding to query. 
Question asked do not imply a particular answer is desired/expected



Clinical Documentation States:



Patient is a 59-year-old female with history of Down Syndrome who presents with 
a chief complaint of shortness of breath. (History and Physical, 08/26/20) 



Pneumonia.  Patient has been placed empirically on daptomycin and ceftriaxone. 
Hypoxia secondary to pneumonia as above. (History and Physical, 08/26/20) 





SEPSIS: Staph haemolyticus. Continue daptomycin and ceftriaxone per ID. (
Progress note, 08/26/20) 





Clinical Findings Show: 



General variables

[X] Fever (temp >38.3 degrees C or 100.4 degrees F)

[] Hypothermia (temp<36 degrees C or 96.0 degrees F)

[] Heart rate > 90/min

[X] Tachypnea/RR > 20 bpm

[] pCO2 < 32 mmHg

[] Altered mental status

[] Significant edema or positive fluid balance (>20 ml/kg over 24h)

[] Hyperglycemia ( Plasma >140 mg/dL or 7.7 mmol/L) in the abscence of diabetes



Inflammatory variables

[X] Leukocytosis (WBC count > 12,000 L1

[] Leukopenia (WBC count < 4000 L1)

[] Normal WBC count with greater than 10% immature forms (Bandemia)



Hemodynamic variables

[X] Arterial hypotension (SBP < 90 mm Hg, MAP < 70 mm Hg, or an SBP decrease > 
40 mm Hg in adults or 

    less than two sd below normal for age)



Organ dysfunction variables

[] Arterial hypoxemia (Pao2/Fio2 < 300)

[] Acute oliguria (urine output < 0.5 mL/kg/hr for at least 2 hrs despite 
adequate fluid resuscitation)

[X] Creatinine increase > 0.5 mg/dL or 44.2 mol/L

[] Coagulation abnormalities (INR > 1.5 or aPTT > 60 s)

[] Ileus (absent bowel sounds)

[] Thrombocytopenia (platelet count < 100,000 L1)

[] Hyperbilirubinemia (plasma total bilirubin > 4 mg/dL or 70 mol/L)



Tissue Perfusion Variables

[X] Hyperlactatemia (> 1 mmol/L)

[] Decreased capillary refill or mottling





Was SEPSIS present on admission?  [X] Yes        [] No          [] Clinically 
undeterminable        





_________________                                    _____________

Physician signature                                       Date



Please also document in your Progress Notes and/or Discharge Summary and 
indicate if the condition was present on admission.

MTDD

## 2020-08-27 NOTE — NUR
NURSE NOTES:

Patient received from Nirav RN. Patient stable AOx0 with no s/sx of pain or distress. NSR on 
cardiac monitor. Normal cardiac sounds. Rhoncus BL on inspiration and expiration with mild 
wheeze on expiration. RR even and unlabored ETT Shiley 7.5 lip line 22. Vent settings AC 20 
500mL 100% FIO2 100% VSS. NPO. Feeding on hold. Valle noted draining 20mLs  to gravity. IV 
line flushed and patent. LT Femoral patent with dressing dry and intact. Levophed infusing 
at 22mL/hr and IV NS @100. Oral care performed. Patient has fever of 102F. Cooling measures 
applied. Will administer Tylenol. Patient repositioned. Side rails upx2, call light within 
reach, bed low and locked. Will continue to monitor.

## 2020-08-27 NOTE — NUR
NURSE HAND-OFF REPORT: 



Latest Vital Signs: Temperature 98.5 , Pulse 61 , B/P 98 /37 , Respiratory Rate 20 , O2 SAT 
94 , Endotracheal Tube, O2 Flow Rate 15.0 .  

Vital Sign Comment: Stable with Levophed



EKG Rhythm: Sinus Rhythm

Rhythm change?: N 

MD Notified?: -

MD Response: 



Latest Momin Fall Score: 50  

Fall Risk: High Risk 

Safety Measures: Call light Within Reach, Bed Alarm Zone 1, Side Rails Side Rails x3, Bed 
position Low and Locked.

Fall Precautions: 

Yellow Socks

Door Sign

Patient Fall Education



Report given to .

## 2020-08-27 NOTE — NUR
NURSE NOTES:

Pt provided with a CHG bed bath, oral care and linen change. ROM exercises performed per pt 
tolerance. Pt tolerated care well. Bedside assessment performed, assessed pt with a FLACC 
scale of 0 noted. VS obtained and remain stable at this time. Levophed continues to infuse 
at 22 mcg/min with no adverse effects noted. SBP remains between , will continue to 
monitor and titrate accordingly. NS continues infusing at 100mL/hr as ordered without 
incident. Fall, Aspiration and Skin precautions observed. Pt remains resting in bed; Bed 
remains in the lowest position with the safety wheels engaged, call light within reach, side 
rails up x3 and bed alarm activated. Will continue plan of care. Will continue to monitor.

## 2020-08-27 NOTE — EMERGENCY ROOM REPORT
History of Present Illness


General


Chief Complaint:  Dyspnea/Respdistress


Source:  Medical Record





Present Illness


HPI


This is a 59-year-old female from a nursing home.  She has a history of seizure

, pneumonia, Down syndrome.  She was admitted to the hospital for pneumonia and 

respiratory distress.  COVID testing was negative.  I was called to evaluate 

the patient in the ICU.  She was hypotensive and hypoxic.  She had a 

respiratory CODE BLUE and was placed on BiPAP earlier today.  On my arrival 

patient was dyspneic with increased work of breathing.  She was hypoxic to the 

low 90 percentile on BiPAP.  She is also hypotensive with systolic in the 70s 

to 80.  I proceeded to intubate the patient and place a central line for 

pressors.


Allergies:  


Coded Allergies:  


     No Known Allergies (Unverified , 1/8/19)





COVID-19 Screening


Contact w/high risk pt:  No


Experienced COVID-19 symptoms?:  No


COVID-19 Testing performed PTA:  Yes


COVID-19 Screening:  Negative COVID-19


COVID-19 Testing Source:  08/10/20





Nursing Documentation-[facility]


Past Medical History:  No History, Except For


Hx Cardiac Problems:  No - PVD


Hx Diabetes:  No - Hypothyroid


Hx Cancer:  No


Hx Gastrointestinal Problems:  No - gastrostomy


Hx Neurological Problems:  Yes - down syndrome


Hx Seizures:  Yes


Hx Speech Problem:  Yes





Review of Systems


Respiratory:  Reports: shortness of breath


All Other Systems:  limited - Secondary to her condition





Physical Exam





Vital Signs








  Date Time  Temp Pulse Resp B/P (MAP) Pulse Ox O2 Delivery O2 Flow Rate FiO2


 


8/23/20 08:00      Venturi Mask 5.0 


 


8/23/20 08:00        35


 


8/23/20 08:00 97.7 68 18 100/62 (75) 98   








Sp02 EP Interpretation:  abnormal


General Appearance:  severe distress, obese, Stupor


Eyes:  bilateral eye PERRL


ENT:  dry mucus membranes


Neck:  supple


Respiratory:  respiratory distress, decreased breath sounds, accessory muscle 

use, crackles


Cardiovascular #1:  regular rate, rhythm


Gastrointestinal:  normal inspection, normal bowel sounds, non tender


Genitourinary:  other - Blackish stool


Musculoskeletal:  other - Lower extremities contracted especially right side


Neurologic:  other - Grimaces to painful stimuli


Skin:  no rash


Lymphatic:  normal inspection





Procedures


Critical Care Time


Critical Care Time


Critical care is mandated in this patient who presented with vent dependent 

respiratory distress secondary to septic shock from pneumonia.  Patient require 

my urgent intervention to attenuate the risks of metabolic collapse which may 

lead to cardiovascular collapse and death.  Critical care time is 35 minutes 

excluding any reportable procedure.  Critical care time included evaluation, 

multiple reevaluation, looking at old charts, interpreting laboratory and 

diagnostic data, discussing case with patient and family and consultants, and 

charting.





Central Line


Central Line :  


   Consent:  Emergent


   Central Line Lumen:  triple


   Maximal Sterile Barrier Tech:  yes cap, yes mask, yes sterile gown, yes 

sterile gloves, yes large sterile sheet, yes hand hygiene, yes chlorhexidine 

prep


   Central Line Postion:  femoral (L)


   US Guided Line?:  No


   Complications:  none


   Central Line Post Position:  sutured, good blood return


   Attempts:  One


   Patient Tolerated:  Well


   Complications:  None





Intubation


Intubation :  


   Consent:  Emergent


   Intubation Method:  orotracheal


   Tube Size (cm):  7.5


   Medications:  Etomidate, Succinylcholine


   Breath Sounds after Intubation:  equal


   Intubation Complications:  no complications


   Post Intubation Xray:  Yes


   Progress/Xray Impression:  ETT in good position. worsening pneumonia


   Attempts:  One


   Patient Tolerated:  Well


   Complications:  None





Medical Decision Making


Diagnostic Impression:  


 Primary Impression:  


 ARDS (adult respiratory distress syndrome)


 Additional Impressions:  


 Septic shock


 HCAP (healthcare-associated pneumonia)


 Respiratory failure requiring intubation


ER Course


Patient presents with septic shock and respiratory distress secondary to 

worsening pneumonia.  Patient is intubated without any difficulty.  Central 

line was placed for pressors.  I contacted pulmonologist Dr. Cherry to update 

him on her condition.


Chest X-Ray Diagnostic Results


Chest X-Ray Diagnostic Results :  


   Chest X-Ray Ordered:  Yes


   # of Views/Limited/Complete:  1 View


   Indication:  Shortness of Breath


   EP Interpretation:  Yes


   Interpretation:  no effusion, no pneumothorax, other - ETT in good position. 

worsening infiltrates


   Impression:  Other - s/p intubation. worsening infiltrates


   Electronically Signed by:  William Burch MD





Last Vital Signs








  Date Time  Temp Pulse Resp B/P (MAP) Pulse Ox O2 Delivery O2 Flow Rate FiO2


 


8/27/20 00:53  84 32 64/37 (46) 93   


 


8/27/20 00:25        100


 


8/27/20 00:00 99.1       


 


8/27/20 00:00      Non-Rebreather 15.0 








Status:  improved


Disposition:  ADMITTED AS INPATIENT


Condition:  Critical


Referrals:  


NON PHYSICIAN (PCP)











William Burch MD Aug 27, 2020 02:11

## 2020-08-27 NOTE — NUR
NURSE NOTES:



Called and left a message to guardian regarding consent for PICC line order. Awaiting call 
back for the consent.

## 2020-08-27 NOTE — PULMONOLGY CRITICAL CARE NOTE
Critical Care - Asmt/Plan


Problems:  


(1) Acute respiratory failure


(2) Pneumonia


(3) Sepsis


(4) HCAP (healthcare-associated pneumonia)


(5) Seizure disorder


(6) Down's syndrome


Respiratory:  monitor respiratory rate, adjust FIO2, CXR


Cardiac:  continue pressors, continue to monitor HR/BP


Renal:  F/U I&O, keep IV fluid, check electrolytes


Infectious Disease:  check cultures


Gastrointestinal:  continue feedings/current rate


Endocrine:  check TSH, continue sliding scale insulin


Hematologic:  transfuse if hgb<8.5


Neurologic:  PRN Ativan, keep patient comfortable


Prophylaxis:  Heparin


Time Spent (Minutes):  40


Notes Reviewed:  cardio, renal


Discussed with:  nurses, consultants, 





Critical Care - Objective





Last 24 Hour Vital Signs








  Date Time  Temp Pulse Resp B/P (MAP) Pulse Ox O2 Delivery O2 Flow Rate FiO2


 


8/27/20 11:00  62 20 110/48 (68) 96   


 


8/27/20 10:31    102/51    


 


8/27/20 10:25        80


 


8/27/20 10:22  67 20     80


 


8/27/20 10:00  68 20 99/45 (63) 97   


 


8/27/20 09:30 100.3       


 


8/27/20 09:30  71 20 100/44 (62) 97   


 


8/27/20 09:11  74 20     90


 


8/27/20 09:10        90


 


8/27/20 09:00  76 20 98/46 (63) 95   


 


8/27/20 08:30  77 20 99/46 (63) 95   


 


8/27/20 08:02    99/44    


 


8/27/20 08:00 102.8 77 20 99/46 (63) 97   


 


8/27/20 08:00  63      


 


8/27/20 08:00      Endotracheal Tube  


 


8/27/20 08:00        100


 


8/27/20 07:30 102.0 77 20 102/47 (65) 97   


 


8/27/20 07:26  78 20     100


 


8/27/20 07:00    102/46    


 


8/27/20 07:00  83 21 102/46 (64) 94   


 


8/27/20 06:45  78 20 90/43 (59) 94   


 


8/27/20 06:30  79 20 89/40 (56) 93   


 


8/27/20 06:15  79 20 94/39 (57) 98   


 


8/27/20 06:00  80 20 89/39 (56) 93   


 


8/27/20 06:00    89/39    


 


8/27/20 05:45  80 20 89/51 (64) 93   


 


8/27/20 05:41  80 20 91/38 (55) 94   


 


8/27/20 05:30  81 20 94/36 (55) 94   


 


8/27/20 05:25  80 20     100


 


8/27/20 05:15  80 20 82/36 (51) 95   


 


8/27/20 05:08  80 20 86/37 (53) 97   


 


8/27/20 05:00    84/36    


 


8/27/20 05:00  81 20 85/38 (54) 98   


 


8/27/20 04:45    85/38    


 


8/27/20 04:45  80 20 85/38 (54) 99   


 


8/27/20 04:30    83/37    


 


8/27/20 04:30  79 20 83/37 (52) 99   


 


8/27/20 04:15  78 20 89/32 (51) 100   


 


8/27/20 04:15    89/32    


 


8/27/20 04:05  78 20 83/31 (48) 100   


 


8/27/20 04:00      Mechanical Ventilator  


 


8/27/20 04:00        100


 


8/27/20 04:00    75/41    


 


8/27/20 04:00 99.0 76 20 75/41 (52) 98   


 


8/27/20 03:50    80/40    


 


8/27/20 03:45  77 19 80/40 (53) 99   


 


8/27/20 03:41  78 20 73/35 (48) 97   


 


8/27/20 03:40    73/35    


 


8/27/20 03:37  79 20 63/37 (46) 99   


 


8/27/20 03:37  79      


 


8/27/20 03:35    63/37    


 


8/27/20 03:30  80 20 67/32 (44) 98   


 


8/27/20 03:30    67/32    


 


8/27/20 03:24    73/38    


 


8/27/20 03:22  79 20     100


 


8/27/20 03:15  80 20 73/38 (50) 100   


 


8/27/20 03:00  81 20 76/45 (55) 100   


 


8/27/20 02:00  79 20 98/52 (67) 100   


 


8/27/20 01:45  79 20 91/43 (59) 99   


 


8/27/20 01:30  88 25 89/44 (59) 95   


 


8/27/20 01:15  78 23     100


 


8/27/20 01:15        100


 


8/27/20 01:15  99 23 84/46 (59) 97   


 


8/27/20 01:02  83 33 71/32 (45) 95   


 


8/27/20 01:00  85 33  92   


 


8/27/20 00:53  84 32 64/37 (46) 93   





  84      


 


8/27/20 00:45  83 38 65/32 (43) 95   


 


8/27/20 00:36  88 38 69/36 (47) 97   


 


8/27/20 00:34  86 43 65/23 (37) 98   


 


8/27/20 00:30  87 21 73/40 (51) 94   


 


8/27/20 00:28  88 27 121/78 (92) 97   


 


8/27/20 00:25  88 33  94   100


 


8/27/20 00:20  107 40 125/95 (105) 73   


 


8/27/20 00:18  108 38 80/34 (49) 81   


 


8/27/20 00:15  106 35 88/46 (60) 77   


 


8/27/20 00:10  122 38 75/36 (49) 73   


 


8/27/20 00:06  107 27 78/40 (53) 84   


 


8/27/20 00:00 99.1 99 51 56/24 (35) 100   


 


8/27/20 00:00       15.0 


 


8/27/20 00:00      Non-Rebreather 15.0 


 


8/27/20 00:00  99      


 


8/26/20 23:50  90 27 110/94 (99) 39   


 


8/26/20 23:45  96 30 100/44 (62) 78   


 


8/26/20 23:30  93 34 70/33 (45) 66   


 


8/26/20 23:00  90 32 83/28 (46) 84   





  90      


 


8/26/20 22:30  88 32 78/29 (45) 84   


 


8/26/20 22:15  77 31 80/26 (44) 86   


 


8/26/20 22:00  79 33 79/29 (46) 94   


 


8/26/20 21:30  78 36 84/31 (48) 97   


 


8/26/20 21:15  79 23 86/27 (46) 86   


 


8/26/20 21:00  77 17 81/29 (46) 91   


 


8/26/20 20:45  76 19 81/30 (47) 90   


 


8/26/20 20:30  75 20 80/25 (43) 90   


 


8/26/20 20:15  74 18 75/29 (44) 89   


 


8/26/20 20:00 98.2 71 21 84/32 (49) 92   


 


8/26/20 20:00      Non-Rebreather 15.0 


 


8/26/20 20:00  71      


 


8/26/20 20:00       15.0 


 


8/26/20 19:45  71 20 76/27 (43) 88   


 


8/26/20 19:30  71 20 84/32 (49) 88   


 


8/26/20 19:15  69 22 78/31 (47) 91   


 


8/26/20 19:05     97 Non-Rebreather 15.0 100


 


8/26/20 19:05  92 18  97 Non-Rebreather 15.0 100


 


8/26/20 19:00  69 22 84/32 (49) 99   


 


8/26/20 18:00  71 15 84/26 (45) 99   


 


8/26/20 17:00  66 14 86/28 (47) 99   


 


8/26/20 16:00 97.7 69 22 86/31 (49) 97   


 


8/26/20 16:00      Non-Rebreather 15.0 


 


8/26/20 16:00       15.0 


 


8/26/20 16:00  62      


 


8/26/20 15:00  70 18 87/31 (49) 95   


 


8/26/20 14:00  65 19 95/33 (53) 100   


 


8/26/20 13:00  69 18 96/43 (60) 100   


 


8/26/20 12:00 97.2 70 15 93/36 (55) 99   


 


8/26/20 12:00      Non-Rebreather 15.0 


 


8/26/20 12:00       15.0 


 


8/26/20 12:00  68      








Status:  awake


Condition:  critical


HEENT:  atraumatic, normocephalic


Neck:  full ROM


Lungs:  clear, chest wall tender


Heart:  HR/BP stable


Abdomen:  soft


Extremities:  no C/C/E


Micro:





Microbiology








 Date/Time


Source Procedure


Growth Status


 


 


 8/26/20 04:50


Blood Blood Culture - Preliminary


NO GROWTH AFTER 24 HOURS Resulted


 


 8/26/20 04:45


Blood Blood Culture - Preliminary


NO GROWTH AFTER 24 HOURS Resulted





 8/26/20 13:30


Sputum Expectorated Gram Stain - Final Resulted


 


 8/26/20 13:30


Sputum Expectorated Sputum Culture


Pending Resulted


 


 8/26/20 13:20


Nasopharynx SARS-CoV-2 RdRp Gene Assay - Final Complete








Accucheck:  131





Critical Care - Subjective


FI02:  80


Vent Support Breath Rate:  20


Vent Support Mode:  AC


Vent Tidal Volume:  500


Sputum Amount:  Small


PEEP:  5.0


PIP:  29


Tube Feeding Amount:  30


I&O:











Intake and Output  


 


 8/26/20 8/27/20





 19:00 07:00


 


Intake Total 1885 ml 1230.620 ml


 


Output Total 225 ml 255 ml


 


Balance 1660 ml 975.620 ml


 


  


 


IV Total 1885 ml 1140.620 ml


 


Other  90 ml


 


Output Urine Total 225 ml 255 ml


 


# Bowel Movements 1 2








CXR:


ET in good position, extensive infiltrate


ET-Tube:  7.5


ET Position:  22


Labs:





Laboratory Tests








Test


  8/26/20


13:25 8/26/20


23:59 8/27/20


03:54 8/27/20


05:14


 


Urine Color Yellow     


 


Urine Appearance Clear     


 


Urine pH 5.0 (4.5-8.0)     


 


Urine Specific Gravity


  1.015


(1.005-1.035) 


  


  


 


 


Urine Protein


  Negative


(NEGATIVE) 


  


  


 


 


Urine Glucose (UA)


  Negative


(NEGATIVE) 


  


  


 


 


Urine Ketones


  Negative


(NEGATIVE) 


  


  


 


 


Urine Blood


  4+ (NEGATIVE)


H 


  


  


 


 


Urine Nitrite


  Negative


(NEGATIVE) 


  


  


 


 


Urine Bilirubin


  Negative


(NEGATIVE) 


  


  


 


 


Urine Urobilinogen


  Normal MG/DL


(0.0-1.0) 


  


  


 


 


Urine Leukocyte Esterase


  Negative


(NEGATIVE) 


  


  


 


 


Urine RBC


  20-30 /HPF (0


- 2)  H 


  


  


 


 


Urine WBC


  0-2 /HPF (0 -


2) 


  


  


 


 


Urine Squamous Epithelial


Cells Occasional


/LPF 


  


  


 


 


Urine Bacteria


  Occasional


/HPF (NONE) 


  


  


 


 


Urine Random Sodium


  < 20 mmol/L


()  L 


  


  


 


 


Arterial Blood pH


  


  7.130


(7.350-7.450) 7.487


(7.350-7.450) 


 


 


Arterial Blood Partial


Pressure CO2 


  77.9 mmHg


(35.0-45.0)  *H 27.4 mmHg


(35.0-45.0)  L 


 


 


Arterial Blood Partial


Pressure O2 


  46.0 mmHg


(75.0-100.0) 53.3 mmHg


(75.0-100.0)  L 


 


 


Arterial Blood HCO3


  


  25.3 mmol/L


(22.0-26.0) 20.3 mmol/L


(22.0-26.0)  L 


 


 


Arterial Blood Oxygen


Saturation 


  71.7 %


()  *L 90.2 %


()  L 


 


 


Arterial Blood Base Excess  -5 (-2-2)  L -2.2 (-2-2)  L 


 


Cole Test  Negative   Positive   


 


White Blood Count


  


  


  


  19.8 K/UL


(4.8-10.8)  H


 


Red Blood Count


  


  


  


  3.13 M/UL


(4.20-5.40)  L


 


Hemoglobin


  


  


  


  10.9 G/DL


(12.0-16.0)  L


 


Hematocrit


  


  


  


  33.1 %


(37.0-47.0)  L


 


Mean Corpuscular Volume


  


  


  


  106 FL (80-99)


H


 


Mean Corpuscular Hemoglobin


  


  


  


  34.7 PG


(27.0-31.0)  H


 


Mean Corpuscular Hemoglobin


Concent 


  


  


  32.9 G/DL


(32.0-36.0)


 


Red Cell Distribution Width


  


  


  


  12.8 %


(11.6-14.8)


 


Platelet Count


  


  


  


  212 K/UL


(150-450)


 


Mean Platelet Volume


  


  


  


  7.0 FL


(6.5-10.1)


 


Neutrophils (%) (Auto)


  


  


  


  % (45.0-75.0)


 


 


Lymphocytes (%) (Auto)


  


  


  


  % (20.0-45.0)


 


 


Monocytes (%) (Auto)     % (1.0-10.0)  


 


Eosinophils (%) (Auto)     % (0.0-3.0)  


 


Basophils (%) (Auto)     % (0.0-2.0)  


 


Differential Total Cells


Counted 


  


  


  100  


 


 


Neutrophils % (Manual)    84 % (45-75)  H


 


Lymphocytes % (Manual)    10 % (20-45)  L


 


Monocytes % (Manual)    6 % (1-10)  


 


Eosinophils % (Manual)    0 % (0-3)  


 


Basophils % (Manual)    0 % (0-2)  


 


Band Neutrophils    0 % (0-8)  


 


Platelet Estimate    Adequate  


 


Platelet Morphology    Normal  


 


Macrocytosis    1+  


 


Sodium Level


  


  


  


  146 MMOL/L


(136-145)  H


 


Potassium Level


  


  


  


  3.4 MMOL/L


(3.5-5.1)  L


 


Chloride Level


  


  


  


  110 MMOL/L


()  H


 


Carbon Dioxide Level


  


  


  


  22 MMOL/L


(21-32)


 


Anion Gap


  


  


  


  14 mmol/L


(5-15)


 


Blood Urea Nitrogen


  


  


  


  19 mg/dL


(7-18)  H


 


Creatinine


  


  


  


  2.2 MG/DL


(0.55-1.30)  H


 


Estimat Glomerular Filtration


Rate 


  


  


  22.9 mL/min


(>60)


 


Glucose Level


  


  


  


  79 MG/DL


()


 


Hemoglobin A1c


  


  


  


  4.6 %


(4.3-6.0)


 


Lactic Acid Level


  


  


  


  3.00 mmol/L


(0.4-2.0)  H


 


Uric Acid


  


  


  


  4.0 MG/DL


(2.6-7.2)


 


Calcium Level


  


  


  


  8.2 MG/DL


(8.5-10.1)  L


 


Phosphorus Level


  


  


  


  2.7 MG/DL


(2.5-4.9)


 


Magnesium Level


  


  


  


  2.2 MG/DL


(1.8-2.4)


 


Total Bilirubin


  


  


  


  0.5 MG/DL


(0.2-1.0)


 


Gamma Glutamyl Transpeptidase    10 U/L (5-85)  


 


Aspartate Amino Transf


(AST/SGOT) 


  


  


  25 U/L (15-37)


 


 


Alanine Aminotransferase


(ALT/SGPT) 


  


  


  9 U/L (12-78)


L


 


Alkaline Phosphatase


  


  


  


  39 U/L


()  L


 


Lactate Dehydrogenase


  


  


  


  247 U/L


()  H


 


Troponin I


  


  


  


  0.057 ng/mL


(0.000-0.056)


 


C-Reactive Protein,


Quantitative 


  


  


  19.4 mg/dL


(0.00-0.90)  H


 


Pro-B-Type Natriuretic Peptide


  


  


  


  12233 pg/mL


(0-125)  H


 


Total Protein


  


  


  


  5.6 G/DL


(6.4-8.2)  L


 


Albumin


  


  


  


  1.9 G/DL


(3.4-5.0)  L


 


Globulin    3.7 g/dL  


 


Albumin/Globulin Ratio


  


  


  


  0.5 (1.0-2.7)


L


 


Triglycerides Level


  


  


  


  67 MG/DL


()


 


Cholesterol Level


  


  


  


  81 MG/DL (<


200)


 


LDL Cholesterol


  


  


  


  51 mg/dL


(<100)


 


HDL Cholesterol


  


  


  


  15 MG/DL


(40-60)  L


 


Cholesterol/HDL Ratio


  


  


  


  5.4 (3.3-4.4)


H


 


Random Vancomycin Level    19.4 ug/mL  


 


Valproic Acid (Depakene) Level


  


  


  


  43 MCG/ML


()  L

















Chano Cherry MD Aug 27, 2020 11:31

## 2020-08-27 NOTE — DIAGNOSTIC IMAGING REPORT
EXAM:

  XR Chest, 1 View

 

CLINICAL HISTORY:

  S/P INTUB

 

TECHNIQUE:

  Frontal view of the chest.

 

COMPARISON:

  8/25/2020

 

FINDINGS:

  Limitations:  The lower thorax is excluded from the field-of-view.

  Lungs:  Bilateral opacities.  Improved right lung volume.

  Pleural space:  Small to moderate bilateral pleural effusions. No 

pneumothorax.

  Heart:  Stable cardiomediastinal silhouette.

  Mediastinum:  See above.

  Bones/joints:  No acute osseous abnormality.

  Tubes, lines and devices:  Endotracheal tube tip projects approximately 

1.9 cm above the lisandro.

 

IMPRESSION:     

1.  Endotracheal tube tip projects approximately 1.9 cm above the lisandro.

2.  Small to moderate bilateral pleural effusions. No pneumothorax.

3.  Bilateral diffuse bilateral parenchymal opacities are nonspecific and 

may be related to edema or an infectious or inflammatory process.

## 2020-08-27 NOTE — INTERNAL MED PROGRESS NOTE
Subjective


Date of Service:  Aug 27, 2020


Physician Name


Sanya Schultz


Attending Physician


Chano Cherry MD





Current Medications








 Medications


  (Trade)  Dose


 Ordered  Sig/Didi


 Route


 PRN Reason  Start Time


 Stop Time Status Last Admin


Dose Admin


 


 Acetaminophen


  (Tylenol)  650 mg  Q4H  PRN


 GT


 FEVER  8/26/20 11:45


 9/25/20 11:44  8/27/20 09:00


 


 


 Azithromycin 500


 mg/Dextrose  275 ml @ 


 275 mls/hr  Q24HRS


 IV


   8/26/20 14:00


 8/29/20 14:59  8/27/20 14:12


 


 


 Chlorhexidine


 Gluconate


  (Beatrice-Hex 2%)  1 applic  DAILY@2000


 TOPIC


   8/26/20 20:00


 11/24/20 19:59   


 


 


 Daptomycin 350 mg/


 Sodium Chloride  55 ml @ 


 100 mls/hr  Q48H


 IV


   8/28/20 11:00


 9/1/20 10:59   


 


 


 Dextrose


  (Dextrose 50%)  25 ml  Q30M  PRN


 IV


 Hypoglycemia  8/26/20 11:30


 11/20/20 11:29   


 


 


 Dextrose


  (Dextrose 50%)  50 ml  Q30M  PRN


 IV


 Hypoglycemia  8/26/20 11:30


 11/20/20 11:29   


 


 


 Heparin Sodium


  (Porcine)


  (Heparin 5000


 units/ml)  5,000 units  EVERY 12  HOURS


 SUBQ


   8/26/20 21:00


 10/6/20 20:59   


 


 


 Heparin Sodium/


 Sodium Chloride


  (Heparin 1000


 units/500ml


 Premix)  1,000 unit  ONCE  PRN


 IV


 radiology  8/26/20 16:15


 8/28/20 18:00   


 


 


 Levothyroxine


 Sodium


  (Synthroid)  75 mcg  DAILY


 GT


   8/27/20 09:00


 9/22/20 08:59  8/27/20 08:44


 


 


 Lidocaine HCl


  (Xylocaine 1%


 30ml)  30 ml  ONCE  PRN


 INJ


 radioloty  8/26/20 16:15


 8/28/20 18:00   


 


 


 Meropenem 1 gm/


 Sodium Chloride  55 ml @ 


 110 mls/hr  Q12H


 IVPB


   8/26/20 16:00


 8/31/20 15:59  8/27/20 15:54


 


 


 Micafungin Sodium


 100 mg/Sodium


 Chloride  110 ml @ 


 110 mls/hr  Q24H


 IVPB


   8/27/20 14:00


 9/3/20 13:59  8/27/20 14:12


 


 


 Midodrine


  (Pro-Amatine)  5 mg  Q6HR


 GT


   8/27/20 12:00


 11/24/20 11:59  8/27/20 18:15


 


 


 Norepinephrine


 Bitartrate 8 mg/


 Dextrose  558 ml @ 0


 mls/hr  Q24H


 IV


   8/27/20 09:00


 9/26/20 08:59  8/27/20 17:49


 


 


 Ondansetron HCl


  (Zofran)  4 mg  Q6H  PRN


 IVP


 Nausea & Vomiting  8/26/20 12:00


 9/21/20 11:59   


 


 


 Pantoprazole


  (Protonix)  40 mg  DAILY


 IV


   8/27/20 09:00


 9/22/20 08:59  8/27/20 08:43


 


 


 Polyethylene


 Glycol


  (Miralax)  17 gm  DAILYPRN  PRN


 GT


 Constipation  8/26/20 12:00


 9/21/20 11:59   


 


 


 Sodium Chloride  1,000 ml @ 


 100 mls/hr  Q10H


 IV


   8/26/20 23:00


 9/25/20 22:59  8/27/20 18:16


 


 


 Temazepam


  (Restoril)  15 mg  HSPRN  PRN


 GT


 Insomnia  8/26/20 21:00


 8/29/20 20:59   


 


 


 Valproic Acid


  (Depakene)  500 mg  EVERY 8  HOURS


 GT


   8/26/20 14:00


 9/21/20 21:59  8/27/20 14:12


 








Allergies:  


Coded Allergies:  


     No Known Allergies (Unverified , 1/8/19)


ROS Limited/Unobtainable:  Yes


Subjective


60 YO F with Down's syndrome admitted with hypoxia.  Now sepsis and pneumonia.  

Cover for Int Med-DR Hung.  ICU.  Intubated and sedated





Objective





Last Vital Signs








  Date Time  Temp Pulse Resp B/P (MAP) Pulse Ox O2 Delivery O2 Flow Rate FiO2


 


8/27/20 18:00  60 20 104/42 (62) 94   


 


8/27/20 16:43        60


 


8/27/20 16:30 98.5       


 


8/27/20 16:00      Endotracheal Tube  


 


8/27/20 00:00       15.0 











Laboratory Tests








Test


  8/26/20


23:59 8/27/20


03:54 8/27/20


05:14 8/27/20


15:20


 


Arterial Blood pH


  7.130


(7.350-7.450) 7.487


(7.350-7.450) 


  


 


 


Arterial Blood Partial


Pressure CO2 77.9 mmHg


(35.0-45.0)  *H 27.4 mmHg


(35.0-45.0)  L 


  


 


 


Arterial Blood Partial


Pressure O2 46.0 mmHg


(75.0-100.0) 53.3 mmHg


(75.0-100.0)  L 


  


 


 


Arterial Blood HCO3


  25.3 mmol/L


(22.0-26.0) 20.3 mmol/L


(22.0-26.0)  L 


  


 


 


Arterial Blood Oxygen


Saturation 71.7 %


()  *L 90.2 %


()  L 


  


 


 


Arterial Blood Base Excess -5 (-2-2)  L -2.2 (-2-2)  L  


 


Cole Test Negative   Positive    


 


White Blood Count


  


  


  19.8 K/UL


(4.8-10.8)  H 


 


 


Red Blood Count


  


  


  3.13 M/UL


(4.20-5.40)  L 


 


 


Hemoglobin


  


  


  10.9 G/DL


(12.0-16.0)  L 


 


 


Hematocrit


  


  


  33.1 %


(37.0-47.0)  L 


 


 


Mean Corpuscular Volume


  


  


  106 FL (80-99)


H 


 


 


Mean Corpuscular Hemoglobin


  


  


  34.7 PG


(27.0-31.0)  H 


 


 


Mean Corpuscular Hemoglobin


Concent 


  


  32.9 G/DL


(32.0-36.0) 


 


 


Red Cell Distribution Width


  


  


  12.8 %


(11.6-14.8) 


 


 


Platelet Count


  


  


  212 K/UL


(150-450) 


 


 


Mean Platelet Volume


  


  


  7.0 FL


(6.5-10.1) 


 


 


Neutrophils (%) (Auto)


  


  


  % (45.0-75.0)


  


 


 


Lymphocytes (%) (Auto)


  


  


  % (20.0-45.0)


  


 


 


Monocytes (%) (Auto)    % (1.0-10.0)   


 


Eosinophils (%) (Auto)    % (0.0-3.0)   


 


Basophils (%) (Auto)    % (0.0-2.0)   


 


Differential Total Cells


Counted 


  


  100  


  


 


 


Neutrophils % (Manual)   84 % (45-75)  H 


 


Lymphocytes % (Manual)   10 % (20-45)  L 


 


Monocytes % (Manual)   6 % (1-10)   


 


Eosinophils % (Manual)   0 % (0-3)   


 


Basophils % (Manual)   0 % (0-2)   


 


Band Neutrophils   0 % (0-8)   


 


Platelet Estimate   Adequate   


 


Platelet Morphology   Normal   


 


Macrocytosis   1+   


 


Sodium Level


  


  


  146 MMOL/L


(136-145)  H 


 


 


Potassium Level


  


  


  3.4 MMOL/L


(3.5-5.1)  L 


 


 


Chloride Level


  


  


  110 MMOL/L


()  H 


 


 


Carbon Dioxide Level


  


  


  22 MMOL/L


(21-32) 


 


 


Anion Gap


  


  


  14 mmol/L


(5-15) 


 


 


Blood Urea Nitrogen


  


  


  19 mg/dL


(7-18)  H 


 


 


Creatinine


  


  


  2.2 MG/DL


(0.55-1.30)  H 


 


 


Estimat Glomerular Filtration


Rate 


  


  22.9 mL/min


(>60) 


 


 


Glucose Level


  


  


  79 MG/DL


() 


 


 


Hemoglobin A1c


  


  


  4.6 %


(4.3-6.0) 


 


 


Lactic Acid Level


  


  


  3.00 mmol/L


(0.4-2.0)  H 2.50 mmol/L


(0.4-2.0)  H


 


Uric Acid


  


  


  4.0 MG/DL


(2.6-7.2) 


 


 


Calcium Level


  


  


  8.2 MG/DL


(8.5-10.1)  L 


 


 


Phosphorus Level


  


  


  2.7 MG/DL


(2.5-4.9) 


 


 


Magnesium Level


  


  


  2.2 MG/DL


(1.8-2.4) 


 


 


Total Bilirubin


  


  


  0.5 MG/DL


(0.2-1.0) 


 


 


Gamma Glutamyl Transpeptidase   10 U/L (5-85)   


 


Aspartate Amino Transf


(AST/SGOT) 


  


  25 U/L (15-37)


  


 


 


Alanine Aminotransferase


(ALT/SGPT) 


  


  9 U/L (12-78)


L 


 


 


Alkaline Phosphatase


  


  


  39 U/L


()  L 


 


 


Lactate Dehydrogenase


  


  


  247 U/L


()  H 


 


 


Troponin I


  


  


  0.057 ng/mL


(0.000-0.056) 


 


 


C-Reactive Protein,


Quantitative 


  


  19.4 mg/dL


(0.00-0.90)  H 


 


 


Pro-B-Type Natriuretic Peptide


  


  


  56449 pg/mL


(0-125)  H 


 


 


Total Protein


  


  


  5.6 G/DL


(6.4-8.2)  L 


 


 


Albumin


  


  


  1.9 G/DL


(3.4-5.0)  L 


 


 


Globulin   3.7 g/dL   


 


Albumin/Globulin Ratio


  


  


  0.5 (1.0-2.7)


L 


 


 


Triglycerides Level


  


  


  67 MG/DL


() 


 


 


Cholesterol Level


  


  


  81 MG/DL (<


200) 


 


 


LDL Cholesterol


  


  


  51 mg/dL


(<100) 


 


 


HDL Cholesterol


  


  


  15 MG/DL


(40-60)  L 


 


 


Cholesterol/HDL Ratio


  


  


  5.4 (3.3-4.4)


H 


 


 


Random Vancomycin Level   19.4 ug/mL   


 


Valproic Acid (Depakene) Level


  


  


  43 MCG/ML


()  L 


 











Microbiology








 Date/Time


Source Procedure


Growth Status


 


 


 8/26/20 04:50


Blood Blood Culture - Preliminary


NO GROWTH AFTER 24 HOURS Resulted


 


 8/26/20 04:45


Blood Blood Culture - Preliminary


NO GROWTH AFTER 24 HOURS Resulted





 8/26/20 13:30


Sputum Expectorated Gram Stain - Final Resulted


 


 8/26/20 13:30


Sputum Expectorated Sputum Culture


Pending Resulted


 


 8/26/20 13:20


Nasopharynx SARS-CoV-2 RdRp Gene Assay - Final Complete

















Intake and Output  


 


 8/26/20 8/27/20





 19:00 07:00


 


Intake Total 1885 ml 1230.620 ml


 


Output Total 225 ml 255 ml


 


Balance 1660 ml 975.620 ml


 


  


 


IV Total 1885 ml 1140.620 ml


 


Other  90 ml


 


Output Urine Total 225 ml 255 ml


 


# Bowel Movements 1 2








Objective


General Appearance:  WD/WN, no apparent distress, alert


EENT:  PERRL/EOMI, normal ENT inspection


Neck:  non-tender, normal alignment, supple, normal inspection


Cardiovascular:  normal peripheral pulses, normal rate, regular rhythm, no 

gallop/murmur, no JVD


Respiratory/Chest:  Mech vent; decreased breath sounds, crackles/rales, rhonchi 

- bilaterally, expiratory wheezing


Abdomen:  normal bowel sounds, non tender, soft, no organomegaly, no mass


Extremities:  normal range of motion


Neurologic:  CNs II-XII grossly normal


Skin:  normal pigmentation, warm/dry





Assessment/Plan


Problem List:  


(1) HCAP (healthcare-associated pneumonia)


Assessment & Plan:  Strep Group G.  Continue daptomycin per ID=Dr Gomes.  

Pulmonary/Critical care=DR Cherry.  COVID NEG





(2) Sepsis


Assessment & Plan:  Staph haemolyticus.  Continue daptomycin and ceftriaxone 

per ID=Dr Gomes





(3) Down's syndrome


(4) Dysphagia


Assessment & Plan:  S/P PEG





(5) Seizure disorder


Assessment & Plan:  Continue keppra and depakote





(6) Hypothyroidism


Assessment & Plan:  Continue synthroid





(7) Acute respiratory failure


Assessment & Plan:  Pulmonary = Dr Cherry; [facility] vent














Sanya Schultz MD Aug 27, 2020 18:58

## 2020-08-27 NOTE — NUR
NURSE NOTES:

Pt provided with a CHG bed bath, oral care and linen change. ROM exercises performed per pt 
tolerance. Pt tolerated care well. Bedside assessment performed, assessed pt with a FLACC 
scale of 0 noted. VS obtained and remain stable at this time. Levophed continues to infuse 
at 22 mcg/min with no adverse effects noted from medication administration.  VS obtained and 
SBP remains >90 at this time. Fall, Aspiration and Skin precautions observed. Pt remains 
resting in bed; Bed remains in the lowest position with the safety wheels engaged, call 
light within reach, side rails up x3 and bed alarm activated. Will continue plan of care. 
Will continue to monitor.

## 2020-08-27 NOTE — NUR
Social Work

This SW met with patient who is  currently in the ICU; intubated, sedated. Patient is from 
[facility] and is managed by public guardian: Kaylan Jo () for decision 
making, currently full code, full treatment. Pending current progress; SW follow as needed.

## 2020-08-27 NOTE — NUR
RESPIRATORY NOTE:

PT RECEIVED ON BIPAP WITH INCREASED WOB. AT THIS TIME PT WAS INTUBATED PER MD ORDER. PT WAS 
PLACED ON MECHANICAL VENTILATION WITH THE FOLLOWING SETTINGS: AC 20, 500, 100%, +5.  
IMPROVED SATURATION AS WELL AS IMPROVEMENT IN WOB WAS NOTED. ALARMS ARE ON AND AUDIBLE. WILL 
CONTINUE TO CLOSELY MONITOR. ABG TO FOLLOW IN TWO HOURS.

## 2020-08-27 NOTE — NEPHROLOGY PROGRESS NOTE
Assessment/Plan


Problem List:  


(1) DAVID (acute kidney injury)


(2) Respiratory failure requiring intubation


(3) Down's syndrome


(4) Seizure disorder


(5) Hypothyroidism


Assessment





Acute renal failure, likely due to hypotension


Acute respiratory distress, hypoxia


Seizure disorder


Hypothyroidism


Down syndrome


Full code











Fluid challenge with IV fluids and albumin


Midodrine for BP above 100 systolic


Check TSH level


Check


Correct level


Monitor renal parameters


Urine studies


Per orders


Plan


August 27: Patient now in ICU.  Intubated.  On pressors.  Labs reviewed.  Will 

increase midodrine.  Aim to keep blood pressure over 100 systolic.  Will give 

albumin bolus.  Will check vancomycin level which was elevated when checked 

previously on August 24.  Will monitor renal parameters.  Continue per 

consultants.





Subjective


ROS Limited/Unobtainable:  Yes





Objective


Objective





Last 24 Hour Vital Signs








  Date Time  Temp Pulse Resp B/P (MAP) Pulse Ox O2 Delivery O2 Flow Rate FiO2


 


8/27/20 09:11  74 20     100


 


8/27/20 08:02    99/44    


 


8/27/20 07:26  78 20     100


 


8/27/20 07:00    102/46    


 


8/27/20 07:00  83 21 102/46 (64) 94   


 


8/27/20 06:45  78 20 90/43 (59) 94   


 


8/27/20 06:30  79 20 89/40 (56) 93   


 


8/27/20 06:15  79 20 94/39 (57) 98   


 


8/27/20 06:00  80 20 89/39 (56) 93   


 


8/27/20 06:00    89/39    


 


8/27/20 05:45  80 20 89/51 (64) 93   


 


8/27/20 05:41  80 20 91/38 (55) 94   


 


8/27/20 05:30  81 20 94/36 (55) 94   


 


8/27/20 05:25  80 20     100


 


8/27/20 05:15  80 20 82/36 (51) 95   


 


8/27/20 05:08  80 20 86/37 (53) 97   


 


8/27/20 05:00    84/36    


 


8/27/20 05:00  81 20 85/38 (54) 98   


 


8/27/20 04:45    85/38    


 


8/27/20 04:45  80 20 85/38 (54) 99   


 


8/27/20 04:30    83/37    


 


8/27/20 04:30  79 20 83/37 (52) 99   


 


8/27/20 04:15  78 20 89/32 (51) 100   


 


8/27/20 04:15    89/32    


 


8/27/20 04:05  78 20 83/31 (48) 100   


 


8/27/20 04:00      Mechanical Ventilator  


 


8/27/20 04:00        100


 


8/27/20 04:00    75/41    


 


8/27/20 04:00 99.0 76 20 75/41 (52) 98   


 


8/27/20 03:50    80/40    


 


8/27/20 03:45  77 19 80/40 (53) 99   


 


8/27/20 03:41  78 20 73/35 (48) 97   


 


8/27/20 03:40    73/35    


 


8/27/20 03:37  79 20 63/37 (46) 99   


 


8/27/20 03:37  79      


 


8/27/20 03:35    63/37    


 


8/27/20 03:30  80 20 67/32 (44) 98   


 


8/27/20 03:30    67/32    


 


8/27/20 03:24    73/38    


 


8/27/20 03:22  79 20     100


 


8/27/20 03:15  80 20 73/38 (50) 100   


 


8/27/20 03:00  81 20 76/45 (55) 100   


 


8/27/20 02:00  79 20 98/52 (67) 100   


 


8/27/20 01:45  79 20 91/43 (59) 99   


 


8/27/20 01:30  88 25 89/44 (59) 95   


 


8/27/20 01:15  78 23     100


 


8/27/20 01:15        100


 


8/27/20 01:15  99 23 84/46 (59) 97   


 


8/27/20 01:02  83 33 71/32 (45) 95   


 


8/27/20 01:00  85 33  92   


 


8/27/20 00:53  84 32 64/37 (46) 93   





  84      


 


8/27/20 00:45  83 38 65/32 (43) 95   


 


8/27/20 00:36  88 38 69/36 (47) 97   


 


8/27/20 00:34  86 43 65/23 (37) 98   


 


8/27/20 00:30  87 21 73/40 (51) 94   


 


8/27/20 00:28  88 27 121/78 (92) 97   


 


8/27/20 00:25  88 33  94   100


 


8/27/20 00:20  107 40 125/95 (105) 73   


 


8/27/20 00:18  108 38 80/34 (49) 81   


 


8/27/20 00:15  106 35 88/46 (60) 77   


 


8/27/20 00:10  122 38 75/36 (49) 73   


 


8/27/20 00:06  107 27 78/40 (53) 84   


 


8/27/20 00:00 99.1 99 51 56/24 (35) 100   


 


8/27/20 00:00       15.0 


 


8/27/20 00:00      Non-Rebreather 15.0 


 


8/27/20 00:00  99      


 


8/26/20 23:50  90 27 110/94 (99) 39   


 


8/26/20 23:45  96 30 100/44 (62) 78   


 


8/26/20 23:30  93 34 70/33 (45) 66   


 


8/26/20 23:00  90 32 83/28 (46) 84   





  90      


 


8/26/20 22:30  88 32 78/29 (45) 84   


 


8/26/20 22:15  77 31 80/26 (44) 86   


 


8/26/20 22:00  79 33 79/29 (46) 94   


 


8/26/20 21:30  78 36 84/31 (48) 97   


 


8/26/20 21:15  79 23 86/27 (46) 86   


 


8/26/20 21:00  77 17 81/29 (46) 91   


 


8/26/20 20:45  76 19 81/30 (47) 90   


 


8/26/20 20:30  75 20 80/25 (43) 90   


 


8/26/20 20:15  74 18 75/29 (44) 89   


 


8/26/20 20:00 98.2 71 21 84/32 (49) 92   


 


8/26/20 20:00      Non-Rebreather 15.0 


 


8/26/20 20:00  71      


 


8/26/20 20:00       15.0 


 


8/26/20 19:45  71 20 76/27 (43) 88   


 


8/26/20 19:30  71 20 84/32 (49) 88   


 


8/26/20 19:15  69 22 78/31 (47) 91   


 


8/26/20 19:05     97 Non-Rebreather 15.0 100


 


8/26/20 19:05  92 18  97 Non-Rebreather 15.0 100


 


8/26/20 19:00  69 22 84/32 (49) 99   


 


8/26/20 18:00  71 15 84/26 (45) 99   


 


8/26/20 17:00  66 14 86/28 (47) 99   


 


8/26/20 16:00 97.7 69 22 86/31 (49) 97   


 


8/26/20 16:00      Non-Rebreather 15.0 


 


8/26/20 16:00       15.0 


 


8/26/20 16:00  62      


 


8/26/20 15:00  70 18 87/31 (49) 95   


 


8/26/20 14:00  65 19 95/33 (53) 100   


 


8/26/20 13:00  69 18 96/43 (60) 100   


 


8/26/20 12:00 97.2 70 15 93/36 (55) 99   


 


8/26/20 12:00      Non-Rebreather 15.0 


 


8/26/20 12:00       15.0 


 


8/26/20 12:00  68      


 


8/26/20 11:00  67 14 99/34 (55) 100   


 


8/26/20 10:00      Non-Rebreather 15.0 


 


8/26/20 10:00  67 14 98/41 (60) 100   

















Intake and Output  


 


 8/26/20 8/27/20





 19:00 07:00


 


Intake Total 1885 ml 1230.620 ml


 


Output Total 225 ml 255 ml


 


Balance 1660 ml 975.620 ml


 


  


 


IV Total 1885 ml 1140.620 ml


 


Other  90 ml


 


Output Urine Total 225 ml 255 ml


 


# Bowel Movements 1 2








Laboratory Tests


8/26/20 13:25: 


Urine Color Yellow, Urine Appearance Clear, Urine pH 5.0, Urine Specific 

Gravity 1.015, Urine Protein Negative, Urine Glucose (UA) Negative, Urine 

Ketones Negative, Urine Blood 4+H, Urine Nitrite Negative, Urine Bilirubin 

Negative, Urine Urobilinogen Normal, Urine Leukocyte Esterase Negative, Urine 

RBC 20-30H, Urine WBC 0-2, Urine Squamous Epithelial Cells Occasional, Urine 

Bacteria Occasional, Urine Random Sodium < 20L


8/26/20 23:59: 


Arterial Blood pH 7.130*L, Arterial Blood Partial Pressure CO2 77.9*H, Arterial 

Blood Partial Pressure O2 46.0*L, Arterial Blood HCO3 25.3, Arterial Blood 

Oxygen Saturation 71.7*L, Arterial Blood Base Excess -5L, Cole Test Negative


8/27/20 03:54: 


Arterial Blood pH 7.487H, Arterial Blood Partial Pressure CO2 27.4L, Arterial 

Blood Partial Pressure O2 53.3L, Arterial Blood HCO3 20.3L, Arterial Blood 

Oxygen Saturation 90.2L, Arterial Blood Base Excess -2.2L, Cole Test Positive


8/27/20 05:14: 


White Blood Count 19.8H, Red Blood Count 3.13L, Hemoglobin 10.9L, Hematocrit 

33.1L, Mean Corpuscular Volume 106H, Mean Corpuscular Hemoglobin 34.7H, Mean 

Corpuscular Hemoglobin Concent 32.9, Red Cell Distribution Width 12.8, Platelet 

Count 212, Mean Platelet Volume 7.0, Neutrophils (%) (Auto) , Lymphocytes (%) (

Auto) , Monocytes (%) (Auto) , Eosinophils (%) (Auto) , Basophils (%) (Auto) , 

Neutrophils % (Manual) [Pending], Lymphocytes % (Manual) [Pending], Platelet 

Estimate [Pending], Platelet Morphology [Pending], Sodium Level 146H, Potassium 

Level 3.4L, Chloride Level 110H, Carbon Dioxide Level 22, Anion Gap 14, Blood 

Urea Nitrogen 19H, Creatinine 2.2H, Estimat Glomerular Filtration Rate 22.9, 

Glucose Level 79, Hemoglobin A1c 4.6, Lactic Acid Level 3.00H, Uric Acid 4.0, 

Calcium Level 8.2L, Phosphorus Level 2.7, Magnesium Level 2.2, Total Bilirubin 

0.5, Gamma Glutamyl Transpeptidase 10, Aspartate Amino Transf (AST/SGOT) 25, 

Alanine Aminotransferase (ALT/SGPT) 9L, Alkaline Phosphatase 39L, Lactate 

Dehydrogenase 247H, Troponin I 0.057H, C-Reactive Protein, Quantitative 19.4H, 

Pro-B-Type Natriuretic Peptide 33610Y, Total Protein 5.6L, Albumin 1.9L, 

Globulin 3.7, Albumin/Globulin Ratio 0.5L, Triglycerides Level 67, Cholesterol 

Level 81, LDL Cholesterol 51, HDL Cholesterol 15L, Cholesterol/HDL Ratio 5.4H, 

Valproic Acid (Depakene) Level 43L


Height (Feet):  5


Height (Inches):  3.00


Weight (Pounds):  147


General Appearance:  no apparent distress


EENT:  other - Intubated on ventilator


Cardiovascular:  normal rate


Respiratory/Chest:  decreased breath sounds


Abdomen:  soft











Lam Nino MD Aug 27, 2020 09:36

## 2020-08-27 NOTE — NUR
NURSE NOTES:

Received patient and report from LAYTON Gutiérrez. Patient is observed resting in bed and noted to 
be confused but responsive to stimuli. Pt is currently unable to follow commands. Eyes noted 
to open spontaneously, 4mm sluggish reaction to light and pt withdrawals to pain. No pain 
noted upon assessment. Pt is currently orally intubated ett size 7.5 noted to be 22 @ lip. 
Pt appears to be tolerating vent settings well; Vent settings as follows: AC 20  FiO2 
60% PEEP 5 with an O2 saturation of 97% noted at this time. Bilateral lower lobe breath 
sounds noted to be diminished upon auscultation and rhonchi noted in bilateral upper and 
lower lobes. Pt noted to be SR on tele monitor with a HR of 72 with no s/sx of acute cardiac 
distress noted. R Hand 22g and L Hand 22g  IV catheters noted which remains asymptomatic, 
intact and patent. L femoral TLC noted which remains intact, patent and asymptomatic. 
Central line dressing remains clean ,dry and intact. G tube noted which remains intact and  
patent. Jevity 1.2 currently infusing at 20mL/hr as ordered with 35mL residual noted. Active 
bowel sounds noted in all four quadrants, abdomen remains large, soft and round. Diagnostics 
reviewed at bedside. Skin alterations noted. Pt repositioned for comfort and safety. Fall, 
Aspiration and Skin precautions observed. Pt remains resting in bed; Bed remains in the 
lowest position with the safety wheels engaged, call light within reach, side rails up x3 
and bed alarm activated. Will continue plan of care. Will continue to monitor.

## 2020-08-27 NOTE — INFECTIOUS DISEASES PROG NOTE
Assessment/Plan








ASSESSMENT:


sp code blue 8/26





Septic Shock


Fever


Mild leukocytosis; increased


  -8/26 u/a no pyuria; ucxp


\





Pneumonia.- COVID 19 neg x3


   Acute hypoxic resp filure on VM> NRB 15l 100%; hypoxic on ABG> now VDRF 8/26

- Fio2 80%


            -8/26 sp cx p


             -8/25 CXR: Increased atelectasis of the right lung, since prior 

exam of 3 days earlier. New or increased right pleural effusion. Increased left 

basilar consolidation and/or pleural fluid 


          -COVID Rapid PCR neg 8/23, 8/23, 8/26


          -8/22 spc x Group G strep


          -8/22 CXR:   Reduced lung volumes.  Patchy bilateral predominantly 

interstitial  pulmonary opacities.  Could be from edema and/or pneumonia.  

There is a broader differential.





Persistent, high grade bacteremia- r/o MRSA; r/o endocarditis


     -8/22 Bcx 4/4 sets S. haemolyticus; 8/23 Bcx 3/4 S/ epi; 8/26 Bcx NTD


     -2d echo: no vegetaions seen





   R/o probable UTI


          ua/ wbc 10-15, nit neg, leuk +1; ucx Neg





DAVID; worsening


 -supratherapeutic vanco levels





-Seizure disorder.


- Hypothyroidism.


- Down syndrome.





History of PEG tube placement.


[facility] resident





PLAN:


1. empiric Daptomycin #3 (abx d #6) for GPC bacteremia in view of DAVID


Meropenem#2 (abx d #6) given resp decompensation


add empiric Micafungin





     8/26 SP Azithromycin #5, Ceftriaxone #2


    8/25 SP IV Vancomycin #4, Zosyn #4


    -8/22 SP Cefepime x1, Flagyl x1





2. Monitor CBC.


3. Monitor BMP.


4. Monitor culture


5.f/u  Repeat cx


6. COVID neg x3


7. Monitor chest x-ray.


8. Monitor the patient's clinical course and labs.  Based on those, we


will do further recommendation.





Thank you, Dr. Cherry, for allowing me to participate in the care of


this patient.  I will follow the patient with you at this


hospitalization.








Discussed with RN.





Subjective


Allergies:  


Coded Allergies:  


     No Known Allergies (Unverified , 1/8/19)


sp intubation yesterday, now FIo2 80%


now on levophed at 22


wbc and cr increased


Tm 102.8





Objective





Last 24 Hour Vital Signs








  Date Time  Temp Pulse Resp B/P (MAP) Pulse Ox O2 Delivery O2 Flow Rate FiO2


 


8/27/20 12:00  61      


 


8/27/20 11:00  62 20 110/48 (68) 96   


 


8/27/20 10:31    102/51    


 


8/27/20 10:25        80


 


8/27/20 10:22  67 20     80


 


8/27/20 10:00  68 20 99/45 (63) 97   


 


8/27/20 09:30 100.3       


 


8/27/20 09:30  71 20 100/44 (62) 97   


 


8/27/20 09:11  74 20     90


 


8/27/20 09:10        90


 


8/27/20 09:00  76 20 98/46 (63) 95   


 


8/27/20 08:30  77 20 99/46 (63) 95   


 


8/27/20 08:02    99/44    


 


8/27/20 08:00 102.8 77 20 99/46 (63) 97   


 


8/27/20 08:00  63      


 


8/27/20 08:00      Endotracheal Tube  


 


8/27/20 08:00        100


 


8/27/20 07:30 102.0 77 20 102/47 (65) 97   


 


8/27/20 07:26  78 20     100


 


8/27/20 07:00    102/46    


 


8/27/20 07:00  83 21 102/46 (64) 94   


 


8/27/20 06:45  78 20 90/43 (59) 94   


 


8/27/20 06:30  79 20 89/40 (56) 93   


 


8/27/20 06:15  79 20 94/39 (57) 98   


 


8/27/20 06:00  80 20 89/39 (56) 93   


 


8/27/20 06:00    89/39    


 


8/27/20 05:45  80 20 89/51 (64) 93   


 


8/27/20 05:41  80 20 91/38 (55) 94   


 


8/27/20 05:30  81 20 94/36 (55) 94   


 


8/27/20 05:25  80 20     100


 


8/27/20 05:15  80 20 82/36 (51) 95   


 


8/27/20 05:08  80 20 86/37 (53) 97   


 


8/27/20 05:00    84/36    


 


8/27/20 05:00  81 20 85/38 (54) 98   


 


8/27/20 04:45    85/38    


 


8/27/20 04:45  80 20 85/38 (54) 99   


 


8/27/20 04:30    83/37    


 


8/27/20 04:30  79 20 83/37 (52) 99   


 


8/27/20 04:15  78 20 89/32 (51) 100   


 


8/27/20 04:15    89/32    


 


8/27/20 04:05  78 20 83/31 (48) 100   


 


8/27/20 04:00      Mechanical Ventilator  


 


8/27/20 04:00        100


 


8/27/20 04:00    75/41    


 


8/27/20 04:00 99.0 76 20 75/41 (52) 98   


 


8/27/20 03:50    80/40    


 


8/27/20 03:45  77 19 80/40 (53) 99   


 


8/27/20 03:41  78 20 73/35 (48) 97   


 


8/27/20 03:40    73/35    


 


8/27/20 03:37  79 20 63/37 (46) 99   


 


8/27/20 03:37  79      


 


8/27/20 03:35    63/37    


 


8/27/20 03:30  80 20 67/32 (44) 98   


 


8/27/20 03:30    67/32    


 


8/27/20 03:24    73/38    


 


8/27/20 03:22  79 20     100


 


8/27/20 03:15  80 20 73/38 (50) 100   


 


8/27/20 03:00  81 20 76/45 (55) 100   


 


8/27/20 02:00  79 20 98/52 (67) 100   


 


8/27/20 01:45  79 20 91/43 (59) 99   


 


8/27/20 01:30  88 25 89/44 (59) 95   


 


8/27/20 01:15  78 23     100


 


8/27/20 01:15        100


 


8/27/20 01:15  99 23 84/46 (59) 97   


 


8/27/20 01:02  83 33 71/32 (45) 95   


 


8/27/20 01:00  85 33  92   


 


8/27/20 00:53  84 32 64/37 (46) 93   





  84      


 


8/27/20 00:45  83 38 65/32 (43) 95   


 


8/27/20 00:36  88 38 69/36 (47) 97   


 


8/27/20 00:34  86 43 65/23 (37) 98   


 


8/27/20 00:30  87 21 73/40 (51) 94   


 


8/27/20 00:28  88 27 121/78 (92) 97   


 


8/27/20 00:25  88 33  94   100


 


8/27/20 00:20  107 40 125/95 (105) 73   


 


8/27/20 00:18  108 38 80/34 (49) 81   


 


8/27/20 00:15  106 35 88/46 (60) 77   


 


8/27/20 00:10  122 38 75/36 (49) 73   


 


8/27/20 00:06  107 27 78/40 (53) 84   


 


8/27/20 00:00 99.1 99 51 56/24 (35) 100   


 


8/27/20 00:00       15.0 


 


8/27/20 00:00      Non-Rebreather 15.0 


 


8/27/20 00:00  99      


 


8/26/20 23:50  90 27 110/94 (99) 39   


 


8/26/20 23:45  96 30 100/44 (62) 78   


 


8/26/20 23:30  93 34 70/33 (45) 66   


 


8/26/20 23:00  90 32 83/28 (46) 84   





  90      


 


8/26/20 22:30  88 32 78/29 (45) 84   


 


8/26/20 22:15  77 31 80/26 (44) 86   


 


8/26/20 22:00  79 33 79/29 (46) 94   


 


8/26/20 21:30  78 36 84/31 (48) 97   


 


8/26/20 21:15  79 23 86/27 (46) 86   


 


8/26/20 21:00  77 17 81/29 (46) 91   


 


8/26/20 20:45  76 19 81/30 (47) 90   


 


8/26/20 20:30  75 20 80/25 (43) 90   


 


8/26/20 20:15  74 18 75/29 (44) 89   


 


8/26/20 20:00 98.2 71 21 84/32 (49) 92   


 


8/26/20 20:00      Non-Rebreather 15.0 


 


8/26/20 20:00  71      


 


8/26/20 20:00       15.0 


 


8/26/20 19:45  71 20 76/27 (43) 88   


 


8/26/20 19:30  71 20 84/32 (49) 88   


 


8/26/20 19:15  69 22 78/31 (47) 91   


 


8/26/20 19:05     97 Non-Rebreather 15.0 100


 


8/26/20 19:05  92 18  97 Non-Rebreather 15.0 100


 


8/26/20 19:00  69 22 84/32 (49) 99   


 


8/26/20 18:00  71 15 84/26 (45) 99   


 


8/26/20 17:00  66 14 86/28 (47) 99   


 


8/26/20 16:00 97.7 69 22 86/31 (49) 97   


 


8/26/20 16:00      Non-Rebreather 15.0 


 


8/26/20 16:00       15.0 


 


8/26/20 16:00  62      


 


8/26/20 15:00  70 18 87/31 (49) 95   


 


8/26/20 14:00  65 19 95/33 (53) 100   


 


8/26/20 13:00  69 18 96/43 (60) 100   








Height (Feet):  5


Height (Inches):  3.00


Weight (Pounds):  147


HEENT:  No pale conjunctivae.  No icterus. NRB in palce


NECK:  No lymphadenopathy.


CHEST:  Coarse breathing sounds.


HEART:  S1 and S2.


ABDOMEN:  Soft.  PEG tube in place.


EXTREMITIES:  No cyanosis at this time .





Microbiology








 Date/Time


Source Procedure


Growth Status


 


 


 8/26/20 04:50


Blood Blood Culture - Preliminary


NO GROWTH AFTER 24 HOURS Resulted


 


 8/26/20 04:45


Blood Blood Culture - Preliminary


NO GROWTH AFTER 24 HOURS Resulted





 8/26/20 13:30


Sputum Expectorated Gram Stain - Final Resulted


 


 8/26/20 13:30


Sputum Expectorated Sputum Culture


Pending Resulted


 


 8/26/20 13:20


Nasopharynx SARS-CoV-2 RdRp Gene Assay - Final Complete











Laboratory Tests








Test


  8/26/20


13:25 8/26/20


23:59 8/27/20


03:54 8/27/20


05:14


 


Urine Color Yellow     


 


Urine Appearance Clear     


 


Urine pH 5.0 (4.5-8.0)     


 


Urine Specific Gravity


  1.015


(1.005-1.035) 


  


  


 


 


Urine Protein


  Negative


(NEGATIVE) 


  


  


 


 


Urine Glucose (UA)


  Negative


(NEGATIVE) 


  


  


 


 


Urine Ketones


  Negative


(NEGATIVE) 


  


  


 


 


Urine Blood


  4+ (NEGATIVE)


H 


  


  


 


 


Urine Nitrite


  Negative


(NEGATIVE) 


  


  


 


 


Urine Bilirubin


  Negative


(NEGATIVE) 


  


  


 


 


Urine Urobilinogen


  Normal MG/DL


(0.0-1.0) 


  


  


 


 


Urine Leukocyte Esterase


  Negative


(NEGATIVE) 


  


  


 


 


Urine RBC


  20-30 /HPF (0


- 2)  H 


  


  


 


 


Urine WBC


  0-2 /HPF (0 -


2) 


  


  


 


 


Urine Squamous Epithelial


Cells Occasional


/LPF 


  


  


 


 


Urine Bacteria


  Occasional


/HPF (NONE) 


  


  


 


 


Urine Random Sodium


  < 20 mmol/L


()  L 


  


  


 


 


Arterial Blood pH


  


  7.130


(7.350-7.450) 7.487


(7.350-7.450) 


 


 


Arterial Blood Partial


Pressure CO2 


  77.9 mmHg


(35.0-45.0)  *H 27.4 mmHg


(35.0-45.0)  L 


 


 


Arterial Blood Partial


Pressure O2 


  46.0 mmHg


(75.0-100.0) 53.3 mmHg


(75.0-100.0)  L 


 


 


Arterial Blood HCO3


  


  25.3 mmol/L


(22.0-26.0) 20.3 mmol/L


(22.0-26.0)  L 


 


 


Arterial Blood Oxygen


Saturation 


  71.7 %


()  *L 90.2 %


()  L 


 


 


Arterial Blood Base Excess  -5 (-2-2)  L -2.2 (-2-2)  L 


 


Cole Test  Negative   Positive   


 


White Blood Count


  


  


  


  19.8 K/UL


(4.8-10.8)  H


 


Red Blood Count


  


  


  


  3.13 M/UL


(4.20-5.40)  L


 


Hemoglobin


  


  


  


  10.9 G/DL


(12.0-16.0)  L


 


Hematocrit


  


  


  


  33.1 %


(37.0-47.0)  L


 


Mean Corpuscular Volume


  


  


  


  106 FL (80-99)


H


 


Mean Corpuscular Hemoglobin


  


  


  


  34.7 PG


(27.0-31.0)  H


 


Mean Corpuscular Hemoglobin


Concent 


  


  


  32.9 G/DL


(32.0-36.0)


 


Red Cell Distribution Width


  


  


  


  12.8 %


(11.6-14.8)


 


Platelet Count


  


  


  


  212 K/UL


(150-450)


 


Mean Platelet Volume


  


  


  


  7.0 FL


(6.5-10.1)


 


Neutrophils (%) (Auto)


  


  


  


  % (45.0-75.0)


 


 


Lymphocytes (%) (Auto)


  


  


  


  % (20.0-45.0)


 


 


Monocytes (%) (Auto)     % (1.0-10.0)  


 


Eosinophils (%) (Auto)     % (0.0-3.0)  


 


Basophils (%) (Auto)     % (0.0-2.0)  


 


Differential Total Cells


Counted 


  


  


  100  


 


 


Neutrophils % (Manual)    84 % (45-75)  H


 


Lymphocytes % (Manual)    10 % (20-45)  L


 


Monocytes % (Manual)    6 % (1-10)  


 


Eosinophils % (Manual)    0 % (0-3)  


 


Basophils % (Manual)    0 % (0-2)  


 


Band Neutrophils    0 % (0-8)  


 


Platelet Estimate    Adequate  


 


Platelet Morphology    Normal  


 


Macrocytosis    1+  


 


Sodium Level


  


  


  


  146 MMOL/L


(136-145)  H


 


Potassium Level


  


  


  


  3.4 MMOL/L


(3.5-5.1)  L


 


Chloride Level


  


  


  


  110 MMOL/L


()  H


 


Carbon Dioxide Level


  


  


  


  22 MMOL/L


(21-32)


 


Anion Gap


  


  


  


  14 mmol/L


(5-15)


 


Blood Urea Nitrogen


  


  


  


  19 mg/dL


(7-18)  H


 


Creatinine


  


  


  


  2.2 MG/DL


(0.55-1.30)  H


 


Estimat Glomerular Filtration


Rate 


  


  


  22.9 mL/min


(>60)


 


Glucose Level


  


  


  


  79 MG/DL


()


 


Hemoglobin A1c


  


  


  


  4.6 %


(4.3-6.0)


 


Lactic Acid Level


  


  


  


  3.00 mmol/L


(0.4-2.0)  H


 


Uric Acid


  


  


  


  4.0 MG/DL


(2.6-7.2)


 


Calcium Level


  


  


  


  8.2 MG/DL


(8.5-10.1)  L


 


Phosphorus Level


  


  


  


  2.7 MG/DL


(2.5-4.9)


 


Magnesium Level


  


  


  


  2.2 MG/DL


(1.8-2.4)


 


Total Bilirubin


  


  


  


  0.5 MG/DL


(0.2-1.0)


 


Gamma Glutamyl Transpeptidase    10 U/L (5-85)  


 


Aspartate Amino Transf


(AST/SGOT) 


  


  


  25 U/L (15-37)


 


 


Alanine Aminotransferase


(ALT/SGPT) 


  


  


  9 U/L (12-78)


L


 


Alkaline Phosphatase


  


  


  


  39 U/L


()  L


 


Lactate Dehydrogenase


  


  


  


  247 U/L


()  H


 


Troponin I


  


  


  


  0.057 ng/mL


(0.000-0.056)


 


C-Reactive Protein,


Quantitative 


  


  


  19.4 mg/dL


(0.00-0.90)  H


 


Pro-B-Type Natriuretic Peptide


  


  


  


  56431 pg/mL


(0-125)  H


 


Total Protein


  


  


  


  5.6 G/DL


(6.4-8.2)  L


 


Albumin


  


  


  


  1.9 G/DL


(3.4-5.0)  L


 


Globulin    3.7 g/dL  


 


Albumin/Globulin Ratio


  


  


  


  0.5 (1.0-2.7)


L


 


Triglycerides Level


  


  


  


  67 MG/DL


()


 


Cholesterol Level


  


  


  


  81 MG/DL (<


200)


 


LDL Cholesterol


  


  


  


  51 mg/dL


(<100)


 


HDL Cholesterol


  


  


  


  15 MG/DL


(40-60)  L


 


Cholesterol/HDL Ratio


  


  


  


  5.4 (3.3-4.4)


H


 


Random Vancomycin Level    19.4 ug/mL  


 


Valproic Acid (Depakene) Level


  


  


  


  43 MCG/ML


()  L











Current Medications








 Medications


  (Trade)  Dose


 Ordered  Sig/Didi


 Route


 PRN Reason  Start Time


 Stop Time Status Last Admin


Dose Admin


 


 Acetaminophen


  (Tylenol)  650 mg  Q4H  PRN


 GT


 FEVER  8/26/20 11:45


 9/25/20 11:44  8/27/20 09:00


 


 


 Azithromycin 500


 mg/Dextrose  275 ml @ 


 275 mls/hr  Q24HRS


 IV


   8/26/20 14:00


 8/29/20 14:59  8/26/20 14:00


 


 


 Chlorhexidine


 Gluconate


  (Beatrice-Hex 2%)  1 applic  DAILY@2000


 TOPIC


   8/26/20 20:00


 11/24/20 19:59   


 


 


 Daptomycin 350 mg/


 Sodium Chloride  55 ml @ 


 100 mls/hr  Q48H


 IV


   8/28/20 11:00


 9/1/20 10:59   


 


 


 Dextrose


  (Dextrose 50%)  25 ml  Q30M  PRN


 IV


 Hypoglycemia  8/26/20 11:30


 11/20/20 11:29   


 


 


 Dextrose


  (Dextrose 50%)  50 ml  Q30M  PRN


 IV


 Hypoglycemia  8/26/20 11:30


 11/20/20 11:29   


 


 


 Heparin Sodium


  (Porcine)


  (Heparin 5000


 units/ml)  5,000 units  EVERY 12  HOURS


 SUBQ


   8/26/20 21:00


 10/6/20 20:59   


 


 


 Heparin Sodium/


 Sodium Chloride


  (Heparin 1000


 units/500ml


 Premix)  1,000 unit  ONCE  PRN


 IV


 radiology  8/26/20 16:15


 8/28/20 18:00   


 


 


 Levothyroxine


 Sodium


  (Synthroid)  75 mcg  DAILY


 GT


   8/27/20 09:00


 9/22/20 08:59  8/27/20 08:44


 


 


 Lidocaine HCl


  (Xylocaine 1%


 30ml)  30 ml  ONCE  PRN


 INJ


 radioloty  8/26/20 16:15


 8/28/20 18:00   


 


 


 Meropenem 1 gm/


 Sodium Chloride  55 ml @ 


 110 mls/hr  Q12H


 IVPB


   8/26/20 16:00


 8/31/20 15:59  8/27/20 02:17


 


 


 Midodrine


  (Pro-Amatine)  5 mg  Q6HR


 GT


   8/27/20 12:00


 11/24/20 11:59  8/27/20 12:14


 


 


 Norepinephrine


 Bitartrate 8 mg/


 Dextrose  558 ml @ 0


 mls/hr  Q24H


 IV


   8/27/20 09:00


 9/26/20 08:59  8/27/20 10:31


 


 


 Ondansetron HCl


  (Zofran)  4 mg  Q6H  PRN


 IVP


 Nausea & Vomiting  8/26/20 12:00


 9/21/20 11:59   


 


 


 Pantoprazole


  (Protonix)  40 mg  DAILY


 IV


   8/27/20 09:00


 9/22/20 08:59  8/27/20 08:43


 


 


 Polyethylene


 Glycol


  (Miralax)  17 gm  DAILYPRN  PRN


 GT


 Constipation  8/26/20 12:00


 9/21/20 11:59   


 


 


 Sodium Chloride  1,000 ml @ 


 100 mls/hr  Q10H


 IV


   8/26/20 23:00


 9/25/20 22:59  8/27/20 08:44


 


 


 Temazepam


  (Restoril)  15 mg  HSPRN  PRN


 GT


 Insomnia  8/26/20 21:00


 8/29/20 20:59   


 


 


 Valproic Acid


  (Depakene)  500 mg  EVERY 8  HOURS


 GT


   8/26/20 14:00


 9/21/20 21:59  8/26/20 14:00


 

















Rema Gomes M.D. Aug 27, 2020 12:49

## 2020-08-28 VITALS — DIASTOLIC BLOOD PRESSURE: 66 MMHG | SYSTOLIC BLOOD PRESSURE: 108 MMHG

## 2020-08-28 VITALS — DIASTOLIC BLOOD PRESSURE: 65 MMHG | SYSTOLIC BLOOD PRESSURE: 128 MMHG

## 2020-08-28 VITALS — SYSTOLIC BLOOD PRESSURE: 123 MMHG | DIASTOLIC BLOOD PRESSURE: 92 MMHG

## 2020-08-28 VITALS — SYSTOLIC BLOOD PRESSURE: 113 MMHG | DIASTOLIC BLOOD PRESSURE: 47 MMHG

## 2020-08-28 VITALS — SYSTOLIC BLOOD PRESSURE: 116 MMHG | DIASTOLIC BLOOD PRESSURE: 59 MMHG

## 2020-08-28 VITALS — SYSTOLIC BLOOD PRESSURE: 87 MMHG | DIASTOLIC BLOOD PRESSURE: 57 MMHG

## 2020-08-28 VITALS — DIASTOLIC BLOOD PRESSURE: 40 MMHG | SYSTOLIC BLOOD PRESSURE: 99 MMHG

## 2020-08-28 VITALS — SYSTOLIC BLOOD PRESSURE: 98 MMHG | DIASTOLIC BLOOD PRESSURE: 39 MMHG

## 2020-08-28 VITALS — SYSTOLIC BLOOD PRESSURE: 106 MMHG | DIASTOLIC BLOOD PRESSURE: 51 MMHG

## 2020-08-28 VITALS — DIASTOLIC BLOOD PRESSURE: 55 MMHG | SYSTOLIC BLOOD PRESSURE: 107 MMHG

## 2020-08-28 VITALS — SYSTOLIC BLOOD PRESSURE: 92 MMHG | DIASTOLIC BLOOD PRESSURE: 47 MMHG

## 2020-08-28 VITALS — SYSTOLIC BLOOD PRESSURE: 108 MMHG | DIASTOLIC BLOOD PRESSURE: 49 MMHG

## 2020-08-28 VITALS — DIASTOLIC BLOOD PRESSURE: 41 MMHG | SYSTOLIC BLOOD PRESSURE: 121 MMHG

## 2020-08-28 VITALS — DIASTOLIC BLOOD PRESSURE: 41 MMHG | SYSTOLIC BLOOD PRESSURE: 59 MMHG

## 2020-08-28 VITALS — SYSTOLIC BLOOD PRESSURE: 104 MMHG | DIASTOLIC BLOOD PRESSURE: 41 MMHG

## 2020-08-28 VITALS — SYSTOLIC BLOOD PRESSURE: 93 MMHG | DIASTOLIC BLOOD PRESSURE: 60 MMHG

## 2020-08-28 VITALS — SYSTOLIC BLOOD PRESSURE: 120 MMHG | DIASTOLIC BLOOD PRESSURE: 101 MMHG

## 2020-08-28 VITALS — DIASTOLIC BLOOD PRESSURE: 57 MMHG | SYSTOLIC BLOOD PRESSURE: 124 MMHG

## 2020-08-28 VITALS — DIASTOLIC BLOOD PRESSURE: 69 MMHG | SYSTOLIC BLOOD PRESSURE: 108 MMHG

## 2020-08-28 VITALS — SYSTOLIC BLOOD PRESSURE: 107 MMHG | DIASTOLIC BLOOD PRESSURE: 52 MMHG

## 2020-08-28 VITALS — DIASTOLIC BLOOD PRESSURE: 45 MMHG | SYSTOLIC BLOOD PRESSURE: 101 MMHG

## 2020-08-28 VITALS — DIASTOLIC BLOOD PRESSURE: 43 MMHG | SYSTOLIC BLOOD PRESSURE: 124 MMHG

## 2020-08-28 VITALS — DIASTOLIC BLOOD PRESSURE: 48 MMHG | SYSTOLIC BLOOD PRESSURE: 115 MMHG

## 2020-08-28 VITALS — SYSTOLIC BLOOD PRESSURE: 93 MMHG | DIASTOLIC BLOOD PRESSURE: 39 MMHG

## 2020-08-28 VITALS — SYSTOLIC BLOOD PRESSURE: 119 MMHG | DIASTOLIC BLOOD PRESSURE: 45 MMHG

## 2020-08-28 VITALS — DIASTOLIC BLOOD PRESSURE: 49 MMHG | SYSTOLIC BLOOD PRESSURE: 108 MMHG

## 2020-08-28 VITALS — DIASTOLIC BLOOD PRESSURE: 87 MMHG | SYSTOLIC BLOOD PRESSURE: 131 MMHG

## 2020-08-28 VITALS — SYSTOLIC BLOOD PRESSURE: 122 MMHG | DIASTOLIC BLOOD PRESSURE: 45 MMHG

## 2020-08-28 VITALS — DIASTOLIC BLOOD PRESSURE: 43 MMHG | SYSTOLIC BLOOD PRESSURE: 101 MMHG

## 2020-08-28 VITALS — DIASTOLIC BLOOD PRESSURE: 42 MMHG | SYSTOLIC BLOOD PRESSURE: 113 MMHG

## 2020-08-28 VITALS — DIASTOLIC BLOOD PRESSURE: 41 MMHG | SYSTOLIC BLOOD PRESSURE: 104 MMHG

## 2020-08-28 VITALS — SYSTOLIC BLOOD PRESSURE: 124 MMHG | DIASTOLIC BLOOD PRESSURE: 57 MMHG

## 2020-08-28 VITALS — SYSTOLIC BLOOD PRESSURE: 114 MMHG | DIASTOLIC BLOOD PRESSURE: 44 MMHG

## 2020-08-28 VITALS — DIASTOLIC BLOOD PRESSURE: 49 MMHG | SYSTOLIC BLOOD PRESSURE: 124 MMHG

## 2020-08-28 VITALS — DIASTOLIC BLOOD PRESSURE: 82 MMHG | SYSTOLIC BLOOD PRESSURE: 97 MMHG

## 2020-08-28 VITALS — SYSTOLIC BLOOD PRESSURE: 96 MMHG | DIASTOLIC BLOOD PRESSURE: 43 MMHG

## 2020-08-28 VITALS — SYSTOLIC BLOOD PRESSURE: 98 MMHG | DIASTOLIC BLOOD PRESSURE: 46 MMHG

## 2020-08-28 VITALS — DIASTOLIC BLOOD PRESSURE: 47 MMHG | SYSTOLIC BLOOD PRESSURE: 115 MMHG

## 2020-08-28 VITALS — DIASTOLIC BLOOD PRESSURE: 43 MMHG | SYSTOLIC BLOOD PRESSURE: 72 MMHG

## 2020-08-28 VITALS — DIASTOLIC BLOOD PRESSURE: 51 MMHG | SYSTOLIC BLOOD PRESSURE: 88 MMHG

## 2020-08-28 VITALS — DIASTOLIC BLOOD PRESSURE: 46 MMHG | SYSTOLIC BLOOD PRESSURE: 112 MMHG

## 2020-08-28 VITALS — SYSTOLIC BLOOD PRESSURE: 77 MMHG | DIASTOLIC BLOOD PRESSURE: 39 MMHG

## 2020-08-28 VITALS — SYSTOLIC BLOOD PRESSURE: 98 MMHG | DIASTOLIC BLOOD PRESSURE: 51 MMHG

## 2020-08-28 VITALS — DIASTOLIC BLOOD PRESSURE: 40 MMHG | SYSTOLIC BLOOD PRESSURE: 104 MMHG

## 2020-08-28 VITALS — SYSTOLIC BLOOD PRESSURE: 121 MMHG | DIASTOLIC BLOOD PRESSURE: 48 MMHG

## 2020-08-28 VITALS — DIASTOLIC BLOOD PRESSURE: 22 MMHG | SYSTOLIC BLOOD PRESSURE: 56 MMHG

## 2020-08-28 VITALS — SYSTOLIC BLOOD PRESSURE: 112 MMHG | DIASTOLIC BLOOD PRESSURE: 56 MMHG

## 2020-08-28 VITALS — SYSTOLIC BLOOD PRESSURE: 104 MMHG | DIASTOLIC BLOOD PRESSURE: 49 MMHG

## 2020-08-28 VITALS — SYSTOLIC BLOOD PRESSURE: 60 MMHG | DIASTOLIC BLOOD PRESSURE: 30 MMHG

## 2020-08-28 VITALS — SYSTOLIC BLOOD PRESSURE: 110 MMHG | DIASTOLIC BLOOD PRESSURE: 68 MMHG

## 2020-08-28 VITALS — DIASTOLIC BLOOD PRESSURE: 42 MMHG | SYSTOLIC BLOOD PRESSURE: 100 MMHG

## 2020-08-28 VITALS — SYSTOLIC BLOOD PRESSURE: 122 MMHG | DIASTOLIC BLOOD PRESSURE: 58 MMHG

## 2020-08-28 VITALS — DIASTOLIC BLOOD PRESSURE: 53 MMHG | SYSTOLIC BLOOD PRESSURE: 94 MMHG

## 2020-08-28 VITALS — SYSTOLIC BLOOD PRESSURE: 122 MMHG | DIASTOLIC BLOOD PRESSURE: 106 MMHG

## 2020-08-28 VITALS — DIASTOLIC BLOOD PRESSURE: 45 MMHG | SYSTOLIC BLOOD PRESSURE: 97 MMHG

## 2020-08-28 VITALS — SYSTOLIC BLOOD PRESSURE: 94 MMHG | DIASTOLIC BLOOD PRESSURE: 43 MMHG

## 2020-08-28 VITALS — DIASTOLIC BLOOD PRESSURE: 109 MMHG | SYSTOLIC BLOOD PRESSURE: 137 MMHG

## 2020-08-28 VITALS — DIASTOLIC BLOOD PRESSURE: 53 MMHG | SYSTOLIC BLOOD PRESSURE: 93 MMHG

## 2020-08-28 VITALS — DIASTOLIC BLOOD PRESSURE: 51 MMHG | SYSTOLIC BLOOD PRESSURE: 115 MMHG

## 2020-08-28 VITALS — DIASTOLIC BLOOD PRESSURE: 79 MMHG | SYSTOLIC BLOOD PRESSURE: 123 MMHG

## 2020-08-28 VITALS — DIASTOLIC BLOOD PRESSURE: 43 MMHG | SYSTOLIC BLOOD PRESSURE: 70 MMHG

## 2020-08-28 VITALS — DIASTOLIC BLOOD PRESSURE: 55 MMHG | SYSTOLIC BLOOD PRESSURE: 112 MMHG

## 2020-08-28 VITALS — DIASTOLIC BLOOD PRESSURE: 64 MMHG | SYSTOLIC BLOOD PRESSURE: 106 MMHG

## 2020-08-28 VITALS — DIASTOLIC BLOOD PRESSURE: 47 MMHG | SYSTOLIC BLOOD PRESSURE: 97 MMHG

## 2020-08-28 VITALS — DIASTOLIC BLOOD PRESSURE: 44 MMHG | SYSTOLIC BLOOD PRESSURE: 115 MMHG

## 2020-08-28 VITALS — SYSTOLIC BLOOD PRESSURE: 85 MMHG | DIASTOLIC BLOOD PRESSURE: 40 MMHG

## 2020-08-28 VITALS — DIASTOLIC BLOOD PRESSURE: 52 MMHG | SYSTOLIC BLOOD PRESSURE: 76 MMHG

## 2020-08-28 VITALS — SYSTOLIC BLOOD PRESSURE: 64 MMHG | DIASTOLIC BLOOD PRESSURE: 27 MMHG

## 2020-08-28 VITALS — DIASTOLIC BLOOD PRESSURE: 41 MMHG | SYSTOLIC BLOOD PRESSURE: 79 MMHG

## 2020-08-28 VITALS — DIASTOLIC BLOOD PRESSURE: 58 MMHG | SYSTOLIC BLOOD PRESSURE: 82 MMHG

## 2020-08-28 VITALS — DIASTOLIC BLOOD PRESSURE: 56 MMHG | SYSTOLIC BLOOD PRESSURE: 118 MMHG

## 2020-08-28 VITALS — DIASTOLIC BLOOD PRESSURE: 49 MMHG | SYSTOLIC BLOOD PRESSURE: 82 MMHG

## 2020-08-28 LAB
ADD MANUAL DIFF: NO
ALBUMIN SERPL-MCNC: 1.3 G/DL (ref 3.4–5)
ALBUMIN/GLOB SERPL: 0.4 {RATIO} (ref 1–2.7)
ALP SERPL-CCNC: 55 U/L (ref 46–116)
ALT SERPL-CCNC: 9 U/L (ref 12–78)
ANION GAP SERPL CALC-SCNC: 11 MMOL/L (ref 5–15)
AST SERPL-CCNC: 34 U/L (ref 15–37)
BILIRUB SERPL-MCNC: 0.2 MG/DL (ref 0.2–1)
BUN SERPL-MCNC: 18 MG/DL (ref 7–18)
CALCIUM SERPL-MCNC: 7.6 MG/DL (ref 8.5–10.1)
CHLORIDE SERPL-SCNC: 105 MMOL/L (ref 98–107)
CO2 SERPL-SCNC: 20 MMOL/L (ref 21–32)
CREAT SERPL-MCNC: 2 MG/DL (ref 0.55–1.3)
ERYTHROCYTE [DISTWIDTH] IN BLOOD BY AUTOMATED COUNT: 12.6 % (ref 11.6–14.8)
GLOBULIN SER-MCNC: 3.7 G/DL
HCT VFR BLD CALC: 33.1 % (ref 37–47)
HGB BLD-MCNC: 11.2 G/DL (ref 12–16)
MCV RBC AUTO: 103 FL (ref 80–99)
PHOSPHATE SERPL-MCNC: 2.3 MG/DL (ref 2.5–4.9)
PLATELET # BLD: 201 K/UL (ref 150–450)
POTASSIUM SERPL-SCNC: 3.5 MMOL/L (ref 3.5–5.1)
RBC # BLD AUTO: 3.21 M/UL (ref 4.2–5.4)
SODIUM SERPL-SCNC: 136 MMOL/L (ref 136–145)
WBC # BLD AUTO: 15.4 K/UL (ref 4.8–10.8)

## 2020-08-28 RX ADMIN — SODIUM CHLORIDE SCH MLS/HR: 900 INJECTION, SOLUTION INTRAVENOUS at 05:03

## 2020-08-28 RX ADMIN — PANTOPRAZOLE SODIUM SCH MG: 40 INJECTION, POWDER, FOR SOLUTION INTRAVENOUS at 09:11

## 2020-08-28 RX ADMIN — CHLORHEXIDINE GLUCONATE SCH APPLIC: 213 SOLUTION TOPICAL at 20:53

## 2020-08-28 RX ADMIN — SODIUM CHLORIDE SCH MG: 0.9 INJECTION INTRAVENOUS at 20:53

## 2020-08-28 RX ADMIN — MIDODRINE HYDROCHLORIDE SCH MG: 10 TABLET ORAL at 22:38

## 2020-08-28 RX ADMIN — DEXTROSE MONOHYDRATE SCH MLS/HR: 50 INJECTION, SOLUTION INTRAVENOUS at 13:36

## 2020-08-28 RX ADMIN — VALPROIC ACID SCH MG: 250 SOLUTION ORAL at 13:36

## 2020-08-28 RX ADMIN — DAPTOMYCIN SCH MLS/HR: 500 INJECTION, POWDER, LYOPHILIZED, FOR SOLUTION INTRAVENOUS at 10:31

## 2020-08-28 RX ADMIN — VALPROIC ACID SCH MG: 250 SOLUTION ORAL at 22:38

## 2020-08-28 RX ADMIN — ACETAMINOPHEN PRN MG: 160 SOLUTION ORAL at 10:31

## 2020-08-28 RX ADMIN — VALPROIC ACID SCH MG: 250 SOLUTION ORAL at 05:03

## 2020-08-28 RX ADMIN — MIDODRINE HYDROCHLORIDE SCH MG: 10 TABLET ORAL at 13:37

## 2020-08-28 RX ADMIN — HEPARIN SODIUM SCH UNITS: 5000 INJECTION INTRAVENOUS; SUBCUTANEOUS at 09:12

## 2020-08-28 RX ADMIN — HEPARIN SODIUM SCH UNITS: 5000 INJECTION INTRAVENOUS; SUBCUTANEOUS at 20:54

## 2020-08-28 RX ADMIN — AZITHROMYCIN DIHYDRATE SCH MLS/HR: 500 INJECTION, POWDER, LYOPHILIZED, FOR SOLUTION INTRAVENOUS at 13:36

## 2020-08-28 RX ADMIN — MEROPENEM SCH MLS/HR: 1 INJECTION INTRAVENOUS at 16:26

## 2020-08-28 RX ADMIN — MEROPENEM SCH MLS/HR: 1 INJECTION INTRAVENOUS at 04:07

## 2020-08-28 RX ADMIN — SODIUM CHLORIDE SCH MLS/HR: 900 INJECTION, SOLUTION INTRAVENOUS at 11:46

## 2020-08-28 RX ADMIN — SODIUM CHLORIDE SCH MLS/HR: 900 INJECTION, SOLUTION INTRAVENOUS at 22:43

## 2020-08-28 RX ADMIN — SODIUM CHLORIDE SCH MLS/HR: 900 INJECTION, SOLUTION INTRAVENOUS at 18:19

## 2020-08-28 NOTE — PULMONOLGY CRITICAL CARE NOTE
Critical Care - Asmt/Plan


Problems:  


(1) Acute respiratory failure


(2) Pneumonia


(3) Sepsis


(4) HCAP (healthcare-associated pneumonia)


(5) Seizure disorder


(6) Down's syndrome


Respiratory:  monitor respiratory rate, adjust FIO2, CXR


Cardiac:  continue to monitor HR/BP


Renal:  F/U I&O, keep IV fluid


Infectious Disease:  check cultures


Gastrointestinal:  continue feedings/current rate


Endocrine:  monitor blood sugar


Hematologic:  monitor H/H, transfuse if hgb<8.5


Neurologic:  PRN Ativan, keep patient comfortable


Prophylaxis:  Heparin


Time Spent (Minutes):  40


Discussed with:  nurses, consultants, 





Critical Care - Objective





Last 24 Hour Vital Signs








  Date Time  Temp Pulse Resp B/P (MAP) Pulse Ox O2 Delivery O2 Flow Rate FiO2


 


8/28/20 10:00  68 22 97/47 (64) 98   


 


8/28/20 09:30  73 21 98/51 (67) 98   


 


8/28/20 09:00  73 22 94/53 (67) 97   


 


8/28/20 08:30  70 20 92/47 (62) 96   


 


8/28/20 08:00  81      


 


8/28/20 08:00      Endotracheal Tube  


 


8/28/20 08:00  74 22 108/66 (80) 100   


 


8/28/20 08:00        100


 


8/28/20 07:30 100.5 66 20 108/49 (68) 94   


 


8/28/20 07:25  62 20     100


 


8/28/20 07:00    101/45    


 


8/28/20 07:00  63 20 101/45 (63) 92   


 


8/28/20 06:30  63 20 100/42 (61) 95   


 


8/28/20 06:00    98/39    


 


8/28/20 06:00  70 20 98/39 (58) 94   


 


8/28/20 05:30  73 21 107/52 (70) 92   


 


8/28/20 05:20        70


 


8/28/20 05:14  67 20     70


 


8/28/20 05:03    96/46    


 


8/28/20 05:02    96/43    


 


8/28/20 05:00    96/43    


 


8/28/20 05:00  66 20 96/43 (60) 88   


 


8/28/20 04:30  67 20 94/43 (60) 92   


 


8/28/20 04:00    104/41    


 


8/28/20 04:00 98.8 71 20 104/41 (62) 91   


 


8/28/20 04:00      Endotracheal Tube  


 


8/28/20 04:00        60


 


8/28/20 03:31  68 20     60


 


8/28/20 03:30  68 20 107/55 (72) 97   


 


8/28/20 03:15  69      


 


8/28/20 03:00    99/40    


 


8/28/20 03:00  72 20 99/40 (59) 89   


 


8/28/20 02:30  72 19 104/41 (62) 90   


 


8/28/20 02:00    98/46    


 


8/28/20 02:00  72 21 98/46 (63) 92   


 


8/28/20 01:39  68 20     60


 


8/28/20 01:30  67 20 93/39 (57) 93   


 


8/28/20 01:00  71 20 108/49 (68) 97   


 


8/28/20 01:00    108/49    


 


8/28/20 00:30  65 20 106/51 (69) 95   


 


8/28/20 00:00      Endotracheal Tube  


 


8/28/20 00:00 99.1 69 20 104/40 (61) 94   


 


8/28/20 00:00    104/40    


 


8/28/20 00:00        60


 


8/27/20 23:32  74 20     60


 


8/27/20 23:30  68 20 109/52 (71) 93   


 


8/27/20 23:22  62 19 129/59 (82) 93   


 


8/27/20 23:17    94/38    


 


8/27/20 23:16    94/38    


 


8/27/20 23:15  71      


 


8/27/20 23:15  71 20 64/35 (45) 87   


 


8/27/20 23:00    98/42    


 


8/27/20 23:00  66 20 98/42 (60) 89   


 


8/27/20 22:30  66 20 98/44 (62) 91   


 


8/27/20 22:00  65 20 90/38 (55) 93   


 


8/27/20 22:00    90/38    


 


8/27/20 21:30  75 21 104/46 (65) 97   


 


8/27/20 21:21  71 20     60


 


8/27/20 21:00  78 22 98/61 (73) 99   


 


8/27/20 21:00    103/55    


 


8/27/20 20:30  80 19 100/85 (90) 99   


 


8/27/20 20:00      Endotracheal Tube  


 


8/27/20 20:00        60


 


8/27/20 20:00    93/38    


 


8/27/20 20:00 99.0 62 20 93/38 (56) 97   


 


8/27/20 19:30  59 20 98/46 (63) 98   


 


8/27/20 19:28  61 20     60


 


8/27/20 19:21  61      


 


8/27/20 19:00  61 20 97/41 (59) 94   


 


8/27/20 19:00    98/37    


 


8/27/20 18:30  61 20 98/37 (57) 93   


 


8/27/20 18:00    104/42    


 


8/27/20 18:00  60 20 104/42 (62) 94   


 


8/27/20 17:49    102/42    


 


8/27/20 17:30  58 20 101/46 (64) 96   


 


8/27/20 17:00    103/49    


 


8/27/20 17:00  58 20 103/49 (67) 100   


 


8/27/20 16:43  70 20     60


 


8/27/20 16:30 98.5 58 20 81/48 (59) 99   


 


8/27/20 16:00      Endotracheal Tube  


 


8/27/20 16:00  61      


 


8/27/20 16:00    114/46    


 


8/27/20 16:00  55 20 123/86 (98) 94   


 


8/27/20 16:00        60


 


8/27/20 15:30  58 20 112/49 (70) 91   


 


8/27/20 15:00    113/62    


 


8/27/20 15:00  65 20 113/62 (79) 99   


 


8/27/20 14:50  59 20     50


 


8/27/20 14:30  60 20 120/55 (76) 98   


 


8/27/20 14:00    116/59    


 


8/27/20 14:00  62 20 116/58 (77) 100   


 


8/27/20 13:30  65 15 113/47 (69) 100   


 


8/27/20 13:15  64 20     70


 


8/27/20 13:00    116/46    


 


8/27/20 13:00  60 20 116/46 (69) 96   





  60      


 


8/27/20 12:45  57 20 110/47 (68) 96   


 


8/27/20 12:30 99.5 58 20 102/48 (66) 95   


 


8/27/20 12:00      Endotracheal Tube  


 


8/27/20 12:00    110/43    


 


8/27/20 12:00  61      


 


8/27/20 12:00 99.5 62 20 110/43 (65) 95   


 


8/27/20 11:30  64 20 114/46 (68) 95   








Status:  sedated


Condition:  critical, grave


Neck:  full ROM


Lungs:  clear


Heart:  HR/BP stable


Abdomen:  soft, feeding tube


Extremities:  no C/C/E


Micro:





Microbiology








 Date/Time


Source Procedure


Growth Status


 


 


 8/26/20 04:50


Blood Blood Culture - Preliminary


NO GROWTH AFTER 24 HOURS Resulted


 


 8/26/20 04:45


Blood Blood Culture - Preliminary


NO GROWTH AFTER 24 HOURS Resulted


 


 8/26/20 13:30


Nasal Nares MRSA Culture - Final


NO METHICILLIN RESISTANT STAPH AUREUS... Complete





 8/26/20 13:30


Sputum Expectorated Gram Stain - Final Complete


 


 8/26/20 13:30


Sputum Expectorated Sputum Culture - Final


NORMAL UPPER RESPIRATORY CLARENCE PRESENT Complete


 


 8/26/20 13:20


Nasopharynx SARS-CoV-2 RdRp Gene Assay - Final Complete








Accucheck:  131





Critical Care - Subjective


ROS Limited/Unobtainable:  Yes


Interval Events:


looks comfortable


FI02:  100


Vent Support Breath Rate:  20


Vent Support Mode:  AC


Vent Tidal Volume:  500


Sputum Amount:  Small


PEEP:  5.0


PIP:  30


Tube Feeding Amount:  30


I&O:











Intake and Output  


 


 8/27/20 8/28/20





 19:00 07:00


 


Intake Total 3216.13 ml 2722.856 ml


 


Output Total 175 ml 845 ml


 


Balance 3041.13 ml 1877.856 ml


 


  


 


Free Water  90 ml


 


IV Total 3156.13 ml 2352.856 ml


 


Tube Feeding 60 ml 280 ml


 


Output Urine Total 175 ml 845 ml


 


# Bowel Movements  1








ET-Tube:  7.5


ET Position:  22


Labs:





Laboratory Tests








Test


  8/27/20


15:20 8/28/20


04:05 8/28/20


08:10


 


Lactic Acid Level


  2.50 mmol/L


(0.4-2.0)  H 1.90 mmol/L


(0.4-2.0) 


 


 


White Blood Count


  


  15.4 K/UL


(4.8-10.8)  H 


 


 


Red Blood Count


  


  3.21 M/UL


(4.20-5.40)  L 


 


 


Hemoglobin


  


  11.2 G/DL


(12.0-16.0)  L 


 


 


Hematocrit


  


  33.1 %


(37.0-47.0)  L 


 


 


Mean Corpuscular Volume


  


  103 FL (80-99)


H 


 


 


Mean Corpuscular Hemoglobin


  


  34.9 PG


(27.0-31.0)  H 


 


 


Mean Corpuscular Hemoglobin


Concent 


  33.9 G/DL


(32.0-36.0) 


 


 


Red Cell Distribution Width


  


  12.6 %


(11.6-14.8) 


 


 


Platelet Count


  


  201 K/UL


(150-450) 


 


 


Mean Platelet Volume


  


  6.7 FL


(6.5-10.1) 


 


 


Neutrophils (%) (Auto)


  


  % (45.0-75.0)


  


 


 


Lymphocytes (%) (Auto)


  


  % (20.0-45.0)


  


 


 


Monocytes (%) (Auto)   % (1.0-10.0)   


 


Eosinophils (%) (Auto)   % (0.0-3.0)   


 


Basophils (%) (Auto)   % (0.0-2.0)   


 


Sodium Level


  


  136 MMOL/L


(136-145)  # 


 


 


Potassium Level


  


  3.5 MMOL/L


(3.5-5.1) 


 


 


Chloride Level


  


  105 MMOL/L


() 


 


 


Carbon Dioxide Level


  


  20 MMOL/L


(21-32)  L 


 


 


Anion Gap


  


  11 mmol/L


(5-15) 


 


 


Blood Urea Nitrogen


  


  18 mg/dL


(7-18) 


 


 


Creatinine


  


  2.0 MG/DL


(0.55-1.30)  H 


 


 


Estimat Glomerular Filtration


Rate 


  25.6 mL/min


(>60) 


 


 


Glucose Level


  


  112 MG/DL


()  H 


 


 


Calcium Level


  


  7.6 MG/DL


(8.5-10.1)  L 


 


 


Phosphorus Level


  


  2.3 MG/DL


(2.5-4.9)  L 


 


 


Magnesium Level


  


  1.8 MG/DL


(1.8-2.4) 


 


 


Total Bilirubin


  


  0.2 MG/DL


(0.2-1.0) 


 


 


Aspartate Amino Transf


(AST/SGOT) 


  34 U/L (15-37)


  


 


 


Alanine Aminotransferase


(ALT/SGPT) 


  9 U/L (12-78)


L 


 


 


Alkaline Phosphatase


  


  55 U/L


() 


 


 


Total Protein


  


  5.0 G/DL


(6.4-8.2)  L 


 


 


Albumin


  


  1.3 G/DL


(3.4-5.0)  L 


 


 


Globulin  3.7 g/dL   


 


Albumin/Globulin Ratio


  


  0.4 (1.0-2.7)


L 


 


 


Arterial Blood pH


  


  


  7.370


(7.350-7.450)


 


Arterial Blood Partial


Pressure CO2 


  


  33.2 mmHg


(35.0-45.0)  L


 


Arterial Blood Partial


Pressure O2 


  


  74.9 mmHg


(75.0-100.0)  L


 


Arterial Blood HCO3


  


  


  18.8 mmol/L


(22.0-26.0)  L


 


Arterial Blood Oxygen


Saturation 


  


  94.5 %


()  L


 


Arterial Blood Base Excess   -5.7 (-2-2)  L


 


Cole Test   Positive  

















Chano Cherry MD Aug 28, 2020 11:27

## 2020-08-28 NOTE — NEPHROLOGY PROGRESS NOTE
Assessment/Plan


Problem List:  


(1) DAVID (acute kidney injury)


(2) Respiratory failure requiring intubation


(3) Down's syndrome


(4) Seizure disorder


(5) Hypothyroidism


Assessment





Acute renal failure, likely due to hypotension


Acute respiratory distress, hypoxia


Seizure disorder


Hypothyroidism


Down syndrome


Full code











Fluid challenge with IV fluids and albumin


Midodrine for BP above 100 systolic


Check TSH level


Check


Correct level


Monitor renal parameters


Urine studies


Per orders


Plan


August 28: Remains intubated.  Labs reviewed.  Serum creatinine lower to 2.  

Vancomycin level lower.  Remains hypotensive on pressors.  Will increase 

midodrine to 10 mg every 8 hours.  Continue per consultants.  Continue to 

monitor renal parameters.


August 27: Patient now in ICU.  Intubated.  On pressors.  Labs reviewed.  Will 

increase midodrine.  Aim to keep blood pressure over 100 systolic.  Will give 

albumin bolus.  Will check vancomycin level which was elevated when checked 

previously on August 24.  Will monitor renal parameters.  Continue per 

consultants.





Subjective


ROS Limited/Unobtainable:  Yes





Objective


Objective





Last 24 Hour Vital Signs








  Date Time  Temp Pulse Resp B/P (MAP) Pulse Ox O2 Delivery O2 Flow Rate FiO2


 


8/28/20 10:00  68 22 97/47 (64) 98   


 


8/28/20 09:30  73 21 98/51 (67) 98   


 


8/28/20 09:00  73 22 94/53 (67) 97   


 


8/28/20 08:30  70 20 92/47 (62) 96   


 


8/28/20 08:00  81      


 


8/28/20 08:00      Endotracheal Tube  


 


8/28/20 08:00  74 22 108/66 (80) 100   


 


8/28/20 08:00        100


 


8/28/20 07:30 100.5 66 20 108/49 (68) 94   


 


8/28/20 07:25  62 20     100


 


8/28/20 07:00    101/45    


 


8/28/20 07:00  63 20 101/45 (63) 92   


 


8/28/20 06:30  63 20 100/42 (61) 95   


 


8/28/20 06:00    98/39    


 


8/28/20 06:00  70 20 98/39 (58) 94   


 


8/28/20 05:30  73 21 107/52 (70) 92   


 


8/28/20 05:20        70


 


8/28/20 05:14  67 20     70


 


8/28/20 05:03    96/46    


 


8/28/20 05:02    96/43    


 


8/28/20 05:00    96/43    


 


8/28/20 05:00  66 20 96/43 (60) 88   


 


8/28/20 04:30  67 20 94/43 (60) 92   


 


8/28/20 04:00    104/41    


 


8/28/20 04:00 98.8 71 20 104/41 (62) 91   


 


8/28/20 04:00      Endotracheal Tube  


 


8/28/20 04:00        60


 


8/28/20 03:31  68 20     60


 


8/28/20 03:30  68 20 107/55 (72) 97   


 


8/28/20 03:15  69      


 


8/28/20 03:00    99/40    


 


8/28/20 03:00  72 20 99/40 (59) 89   


 


8/28/20 02:30  72 19 104/41 (62) 90   


 


8/28/20 02:00    98/46    


 


8/28/20 02:00  72 21 98/46 (63) 92   


 


8/28/20 01:39  68 20     60


 


8/28/20 01:30  67 20 93/39 (57) 93   


 


8/28/20 01:00  71 20 108/49 (68) 97   


 


8/28/20 01:00    108/49    


 


8/28/20 00:30  65 20 106/51 (69) 95   


 


8/28/20 00:00      Endotracheal Tube  


 


8/28/20 00:00 99.1 69 20 104/40 (61) 94   


 


8/28/20 00:00    104/40    


 


8/28/20 00:00        60


 


8/27/20 23:32  74 20     60


 


8/27/20 23:30  68 20 109/52 (71) 93   


 


8/27/20 23:22  62 19 129/59 (82) 93   


 


8/27/20 23:17    94/38    


 


8/27/20 23:16    94/38    


 


8/27/20 23:15  71      


 


8/27/20 23:15  71 20 64/35 (45) 87   


 


8/27/20 23:00    98/42    


 


8/27/20 23:00  66 20 98/42 (60) 89   


 


8/27/20 22:30  66 20 98/44 (62) 91   


 


8/27/20 22:00  65 20 90/38 (55) 93   


 


8/27/20 22:00    90/38    


 


8/27/20 21:30  75 21 104/46 (65) 97   


 


8/27/20 21:21  71 20     60


 


8/27/20 21:00  78 22 98/61 (73) 99   


 


8/27/20 21:00    103/55    


 


8/27/20 20:30  80 19 100/85 (90) 99   


 


8/27/20 20:00      Endotracheal Tube  


 


8/27/20 20:00        60


 


8/27/20 20:00    93/38    


 


8/27/20 20:00 99.0 62 20 93/38 (56) 97   


 


8/27/20 19:30  59 20 98/46 (63) 98   


 


8/27/20 19:28  61 20     60


 


8/27/20 19:21  61      


 


8/27/20 19:00  61 20 97/41 (59) 94   


 


8/27/20 19:00    98/37    


 


8/27/20 18:30  61 20 98/37 (57) 93   


 


8/27/20 18:00    104/42    


 


8/27/20 18:00  60 20 104/42 (62) 94   


 


8/27/20 17:49    102/42    


 


8/27/20 17:30  58 20 101/46 (64) 96   


 


8/27/20 17:00    103/49    


 


8/27/20 17:00  58 20 103/49 (67) 100   


 


8/27/20 16:43  70 20     60


 


8/27/20 16:30 98.5 58 20 81/48 (59) 99   


 


8/27/20 16:00      Endotracheal Tube  


 


8/27/20 16:00  61      


 


8/27/20 16:00    114/46    


 


8/27/20 16:00  55 20 123/86 (98) 94   


 


8/27/20 16:00        60


 


8/27/20 15:30  58 20 112/49 (70) 91   


 


8/27/20 15:00    113/62    


 


8/27/20 15:00  65 20 113/62 (79) 99   


 


8/27/20 14:50  59 20     50


 


8/27/20 14:30  60 20 120/55 (76) 98   


 


8/27/20 14:00    116/59    


 


8/27/20 14:00  62 20 116/58 (77) 100   


 


8/27/20 13:30  65 15 113/47 (69) 100   


 


8/27/20 13:15  64 20     70


 


8/27/20 13:00    116/46    


 


8/27/20 13:00  60 20 116/46 (69) 96   





  60      


 


8/27/20 12:45  57 20 110/47 (68) 96   


 


8/27/20 12:30 99.5 58 20 102/48 (66) 95   


 


8/27/20 12:00      Endotracheal Tube  


 


8/27/20 12:00    110/43    


 


8/27/20 12:00  61      


 


8/27/20 12:00 99.5 62 20 110/43 (65) 95   


 


8/27/20 11:30  64 20 114/46 (68) 95   


 


8/27/20 11:00    106/47    


 


8/27/20 11:00  62 20 110/48 (68) 96   

















Intake and Output  


 


 8/27/20 8/28/20





 19:00 07:00


 


Intake Total 3216.13 ml 2722.856 ml


 


Output Total 175 ml 845 ml


 


Balance 3041.13 ml 1877.856 ml


 


  


 


Free Water  90 ml


 


IV Total 3156.13 ml 2352.856 ml


 


Tube Feeding 60 ml 280 ml


 


Output Urine Total 175 ml 845 ml


 


# Bowel Movements  1








Laboratory Tests


8/27/20 15:20: Lactic Acid Level 2.50H


8/28/20 04:05: 


Lactic Acid Level 1.90, White Blood Count 15.4H, Red Blood Count 3.21L, 

Hemoglobin 11.2L, Hematocrit 33.1L, Mean Corpuscular Volume 103H, Mean 

Corpuscular Hemoglobin 34.9H, Mean Corpuscular Hemoglobin Concent 33.9, Red 

Cell Distribution Width 12.6, Platelet Count 201, Mean Platelet Volume 6.7, 

Neutrophils (%) (Auto) , Lymphocytes (%) (Auto) , Monocytes (%) (Auto) , 

Eosinophils (%) (Auto) , Basophils (%) (Auto) , Sodium Level 136#, Potassium 

Level 3.5, Chloride Level 105, Carbon Dioxide Level 20L, Anion Gap 11, Blood 

Urea Nitrogen 18, Creatinine 2.0H, Estimat Glomerular Filtration Rate 25.6, 

Glucose Level 112H, Calcium Level 7.6L, Phosphorus Level 2.3L, Magnesium Level 

1.8, Total Bilirubin 0.2, Aspartate Amino Transf (AST/SGOT) 34, Alanine 

Aminotransferase (ALT/SGPT) 9L, Alkaline Phosphatase 55, Total Protein 5.0L, 

Albumin 1.3L, Globulin 3.7, Albumin/Globulin Ratio 0.4L


8/28/20 08:10: 


Arterial Blood pH 7.370, Arterial Blood Partial Pressure CO2 33.2L, Arterial 

Blood Partial Pressure O2 74.9L, Arterial Blood HCO3 18.8L, Arterial Blood 

Oxygen Saturation 94.5L, Arterial Blood Base Excess -5.7L, Cole Test Positive


Height (Feet):  5


Height (Inches):  3.00


Weight (Pounds):  149


General Appearance:  no apparent distress


EENT:  other - Intubated on ventilator


Cardiovascular:  normal rate


Respiratory/Chest:  decreased breath sounds


Abdomen:  distended











Lam Nino MD Aug 28, 2020 10:38

## 2020-08-28 NOTE — DIAGNOSTIC IMAGING REPORT
Indication: Dyspnea

 

Technique: One view of the chest

 

Comparison: 8/27/2020

 

Findings: There is increasing dense consolidation in the left upper lobe. Diffuse

extensive infiltrates elsewhere persist, unchanged. There is pleural fluid on the

right, likely increased although this area was not well imaged on the previous exam

 

Impression: Increasing left upper lobe dense consolidation and likely increasing

bilateral pleural fluid. Persistent diffuse dense consolidation elsewhere

## 2020-08-28 NOTE — NUR
NURSE NOTES:

Called Dr. Hung regarding patient with 200cc gt residual, Dr. Hung gave order noted and 
carried out.

## 2020-08-28 NOTE — NUR
NURSE NOTES:

Report received from LAYTON Diggs. Alert and open eyes to deep pain. Orally intubated 7.5/23 
AC 20  FiO2 100.Afebrile at this time with no apparent acute distress.On GT feeding 
Jevity 1.2 at 30cc /hr and tolerate well.Placement intact and no residual at this 
time.LF/TLC running Levophed at 22mcg/hr and NS at 100 cc/hr.Valle catheter in place 
draining clear yellow urine with no apparent sediment. Abdomen soft and non 
distended.Bilateral lungs sounds diminished.HOB elevated to prevent aspiration and mouth 
care done, suctioned as tolerate.Patient turned and repositioned for comfort.Call light 
within easy reach.Will continue close monitoring.

## 2020-08-28 NOTE — NUR
NURSE NOTES:

Turned and repositioned, mouth care done.Tolerate well feeding.No significant change at this 
time. Call light within easy reach.Seen by Dr Nino, will follow up with new orders

## 2020-08-28 NOTE — NUR
NURSE NOTES:

Reglan 5mg  IVP given, rechecked with 100cc residual. HOB elevated. will monitor patient.

## 2020-08-28 NOTE — NUR
NURSE NOTES:

ADLs done,turned and repositioned.GT feeding held due to residual 105cc. Will continue close 
monitoring.HOB elevated to prevent aspiration.No significant change at this time.Will 
continue close monitoring.

## 2020-08-28 NOTE — INTERNAL MED PROGRESS NOTE
Subjective


Date of Service:  Aug 28, 2020


Physician Name


Sanya Schultz


Attending Physician


Chano Cherry MD





Current Medications








 Medications


  (Trade)  Dose


 Ordered  Sig/Didi


 Route


 PRN Reason  Start Time


 Stop Time Status Last Admin


Dose Admin


 


 Acetaminophen


  (Tylenol)  650 mg  Q4H  PRN


 GT


 FEVER  8/26/20 11:45


 9/25/20 11:44  8/28/20 10:31


 


 


 Azithromycin 500


 mg/Dextrose  275 ml @ 


 275 mls/hr  Q24HRS


 IV


   8/26/20 14:00


 8/29/20 14:59  8/27/20 14:12


 


 


 Chlorhexidine


 Gluconate


  (Beatrice-Hex 2%)  1 applic  DAILY@2000


 TOPIC


   8/26/20 20:00


 11/24/20 19:59  8/27/20 20:19


 


 


 Daptomycin 350 mg/


 Sodium Chloride  55 ml @ 


 100 mls/hr  Q48H


 IV


   8/28/20 11:00


 9/1/20 10:59  8/28/20 10:31


 


 


 Dextrose


  (Dextrose 50%)  25 ml  Q30M  PRN


 IV


 Hypoglycemia  8/26/20 11:30


 11/20/20 11:29   


 


 


 Dextrose


  (Dextrose 50%)  50 ml  Q30M  PRN


 IV


 Hypoglycemia  8/26/20 11:30


 11/20/20 11:29   


 


 


 Heparin Sodium


  (Porcine)


  (Heparin 5000


 units/ml)  5,000 units  EVERY 12  HOURS


 SUBQ


   8/26/20 21:00


 10/6/20 20:59  8/28/20 09:12


 


 


 Heparin Sodium/


 Sodium Chloride


  (Heparin 1000


 units/500ml


 Premix)  1,000 unit  ONCE  PRN


 IV


 radiology  8/26/20 16:15


 8/28/20 18:00   


 


 


 Levothyroxine


 Sodium


  (Synthroid)  75 mcg  DAILY


 GT


   8/27/20 09:00


 9/22/20 08:59  8/28/20 09:11


 


 


 Lidocaine HCl


  (Xylocaine 1%


 30ml)  30 ml  ONCE  PRN


 INJ


 radioloty  8/26/20 16:15


 8/28/20 18:00   


 


 


 Meropenem 1 gm/


 Sodium Chloride  55 ml @ 


 110 mls/hr  Q12H


 IVPB


   8/26/20 16:00


 8/31/20 15:59  8/28/20 04:07


 


 


 Micafungin Sodium


 100 mg/Sodium


 Chloride  110 ml @ 


 110 mls/hr  Q24H


 IVPB


   8/27/20 14:00


 9/3/20 13:59  8/27/20 14:12


 


 


 Midodrine


  (Pro-Amatine)  10 mg  Q8HR


 GT


   8/28/20 14:00


 11/26/20 13:59   


 


 


 Norepinephrine


 Bitartrate 8 mg/


 Dextrose  558 ml @ 0


 mls/hr  Q24H


 IV


   8/27/20 09:00


 9/26/20 08:59  8/28/20 11:46


 


 


 Ondansetron HCl


  (Zofran)  4 mg  Q6H  PRN


 IVP


 Nausea & Vomiting  8/26/20 12:00


 9/21/20 11:59   


 


 


 Pantoprazole


  (Protonix)  40 mg  DAILY


 IV


   8/27/20 09:00


 9/22/20 08:59  8/28/20 09:11


 


 


 Polyethylene


 Glycol


  (Miralax)  17 gm  DAILYPRN  PRN


 GT


 Constipation  8/26/20 12:00


 9/21/20 11:59   


 


 


 Potassium


 Phosphate 20 mm/


 Sodium Chloride  281.6667


 ml @ 


 46.944 m...  ONCE  ONCE


 IV


   8/28/20 09:00


 8/28/20 14:59  8/28/20 09:13


 


 


 Sodium Chloride  1,000 ml @ 


 100 mls/hr  Q10H


 IV


   8/26/20 23:00


 9/25/20 22:59  8/28/20 04:08


 


 


 Sodium Chloride  1,000 ml @ 


 999 mls/hr  Q1H1M


 IV


   8/28/20 12:15


 8/28/20 13:15  8/28/20 12:40


 


 


 Temazepam


  (Restoril)  15 mg  HSPRN  PRN


 GT


 Insomnia  8/26/20 21:00


 8/29/20 20:59   


 


 


 Valproic Acid


  (Depakene)  500 mg  EVERY 8  HOURS


 GT


   8/26/20 14:00


 9/21/20 21:59  8/28/20 05:03


 








Allergies:  


Coded Allergies:  


     No Known Allergies (Unverified , 1/8/19)


ROS Limited/Unobtainable:  Yes


Subjective


58 YO F with Down's syndrome admitted with hypoxia.  Now sepsis and pneumonia.  

Cover for Int Phi-DR Hung.  ICU.  Intubated and sedated





Objective





Last Vital Signs








  Date Time  Temp Pulse Resp B/P (MAP) Pulse Ox O2 Delivery O2 Flow Rate FiO2


 


8/28/20 12:00  52      


 


8/28/20 12:00   20 115/51 (72) 100   


 


8/28/20 12:00        100


 


8/28/20 12:00      Endotracheal Tube  


 


8/28/20 11:01 100.0       


 


8/27/20 00:00       15.0 











Laboratory Tests








Test


  8/27/20


15:20 8/28/20


04:05 8/28/20


08:10


 


Lactic Acid Level


  2.50 mmol/L


(0.4-2.0)  H 1.90 mmol/L


(0.4-2.0) 


 


 


White Blood Count


  


  15.4 K/UL


(4.8-10.8)  H 


 


 


Red Blood Count


  


  3.21 M/UL


(4.20-5.40)  L 


 


 


Hemoglobin


  


  11.2 G/DL


(12.0-16.0)  L 


 


 


Hematocrit


  


  33.1 %


(37.0-47.0)  L 


 


 


Mean Corpuscular Volume


  


  103 FL (80-99)


H 


 


 


Mean Corpuscular Hemoglobin


  


  34.9 PG


(27.0-31.0)  H 


 


 


Mean Corpuscular Hemoglobin


Concent 


  33.9 G/DL


(32.0-36.0) 


 


 


Red Cell Distribution Width


  


  12.6 %


(11.6-14.8) 


 


 


Platelet Count


  


  201 K/UL


(150-450) 


 


 


Mean Platelet Volume


  


  6.7 FL


(6.5-10.1) 


 


 


Neutrophils (%) (Auto)


  


  % (45.0-75.0)


  


 


 


Lymphocytes (%) (Auto)


  


  % (20.0-45.0)


  


 


 


Monocytes (%) (Auto)   % (1.0-10.0)   


 


Eosinophils (%) (Auto)   % (0.0-3.0)   


 


Basophils (%) (Auto)   % (0.0-2.0)   


 


Sodium Level


  


  136 MMOL/L


(136-145)  # 


 


 


Potassium Level


  


  3.5 MMOL/L


(3.5-5.1) 


 


 


Chloride Level


  


  105 MMOL/L


() 


 


 


Carbon Dioxide Level


  


  20 MMOL/L


(21-32)  L 


 


 


Anion Gap


  


  11 mmol/L


(5-15) 


 


 


Blood Urea Nitrogen


  


  18 mg/dL


(7-18) 


 


 


Creatinine


  


  2.0 MG/DL


(0.55-1.30)  H 


 


 


Estimat Glomerular Filtration


Rate 


  25.6 mL/min


(>60) 


 


 


Glucose Level


  


  112 MG/DL


()  H 


 


 


Calcium Level


  


  7.6 MG/DL


(8.5-10.1)  L 


 


 


Phosphorus Level


  


  2.3 MG/DL


(2.5-4.9)  L 


 


 


Magnesium Level


  


  1.8 MG/DL


(1.8-2.4) 


 


 


Total Bilirubin


  


  0.2 MG/DL


(0.2-1.0) 


 


 


Aspartate Amino Transf


(AST/SGOT) 


  34 U/L (15-37)


  


 


 


Alanine Aminotransferase


(ALT/SGPT) 


  9 U/L (12-78)


L 


 


 


Alkaline Phosphatase


  


  55 U/L


() 


 


 


Total Protein


  


  5.0 G/DL


(6.4-8.2)  L 


 


 


Albumin


  


  1.3 G/DL


(3.4-5.0)  L 


 


 


Globulin  3.7 g/dL   


 


Albumin/Globulin Ratio


  


  0.4 (1.0-2.7)


L 


 


 


Arterial Blood pH


  


  


  7.370


(7.350-7.450)


 


Arterial Blood Partial


Pressure CO2 


  


  33.2 mmHg


(35.0-45.0)  L


 


Arterial Blood Partial


Pressure O2 


  


  74.9 mmHg


(75.0-100.0)  L


 


Arterial Blood HCO3


  


  


  18.8 mmol/L


(22.0-26.0)  L


 


Arterial Blood Oxygen


Saturation 


  


  94.5 %


()  L


 


Arterial Blood Base Excess   -5.7 (-2-2)  L


 


Cole Test   Positive  











Microbiology








 Date/Time


Source Procedure


Growth Status


 


 


 8/26/20 04:50


Blood Blood Culture - Preliminary


NO GROWTH AFTER 24 HOURS Resulted


 


 8/26/20 04:45


Blood Blood Culture - Preliminary


NO GROWTH AFTER 24 HOURS Resulted


 


 8/26/20 13:30


Nasal Nares MRSA Culture - Final


NO METHICILLIN RESISTANT STAPH AUREUS... Complete





 8/26/20 13:30


Sputum Expectorated Gram Stain - Final Complete


 


 8/26/20 13:30


Sputum Expectorated Sputum Culture - Final


NORMAL UPPER RESPIRATORY CLARENCE PRESENT Complete


 


 8/26/20 13:20


Nasopharynx SARS-CoV-2 RdRp Gene Assay - Final Complete

















Intake and Output  


 


 8/27/20 8/28/20





 19:00 07:00


 


Intake Total 3216.13 ml 2722.856 ml


 


Output Total 175 ml 845 ml


 


Balance 3041.13 ml 1877.856 ml


 


  


 


Free Water  90 ml


 


IV Total 3156.13 ml 2352.856 ml


 


Tube Feeding 60 ml 280 ml


 


Output Urine Total 175 ml 845 ml


 


# Bowel Movements  1








Objective


General Appearance:  WD/WN, no apparent distress, alert


EENT:  PERRL/EOMI, normal ENT inspection


Neck:  non-tender, normal alignment, supple, normal inspection


Cardiovascular:  normal peripheral pulses, normal rate, regular rhythm, no 

gallop/murmur, no JVD


Respiratory/Chest:  [facility] vent; decreased breath sounds, crackles/rales, rhonchi 

- bilaterally, expiratory wheezing


Abdomen:  normal bowel sounds, non tender, soft, no organomegaly, no mass


Extremities:  normal range of motion


Neurologic:  CNs II-XII grossly normal


Skin:  normal pigmentation, warm/dry





Assessment/Plan


Problem List:  


(1) HCAP (healthcare-associated pneumonia)


Assessment & Plan:  Strep Group G.  Continue daptomycin per ID=Dr Gomes.  

Pulmonary/Critical care=DR Cherry.  COVID NEG





(2) Sepsis


Assessment & Plan:  Staph haemolyticus.  Continue daptomycin and ceftriaxone 

per ID=Dr Gomes





(3) Down's syndrome


(4) Dysphagia


Assessment & Plan:  S/P PEG





(5) Seizure disorder


Assessment & Plan:  Continue keppra and depakote





(6) Hypothyroidism


Assessment & Plan:  Continue synthroid





(7) Acute respiratory failure


Assessment & Plan:  Pulmonary = Dr Cherry; [facility] vent














Sanya Schultz MD Aug 28, 2020 12:50

## 2020-08-28 NOTE — NUR
NURSE NOTES:

ADLs done, mouth care performed and suctioned as tolerated.HOB elevated to prevent 
aspiration.No apparent acute distress, no significant change.Will continue close 
monitoring.Call light within easy reach.

## 2020-08-28 NOTE — NUR
NURSE NOTES:

patient in bed awake, no s/s of acute distress noted. HOB elevated. with 15cc gt residual. 
continue on Levophed drip at 28mcg/min /45. no s/s of hypo/hyperglycemia. skin warm 
and dry to touch.  will continue plan of care.

## 2020-08-28 NOTE — NUR
RD ASSESSMENT & RECOMMENDATIONS

SEE CARE ACTIVITY FOR COMPLETE ASSESSMENT



DAILY ESTIMATED NEEDS:

Needs based on CRITICAL CARE, 50kg  

22-27  kcals/kg 

5904-1045  total kcals

1.25-2  g protein/kg

  g total protein 

25-30  mL/kg

9562-5979  total fluid mLs



NUTRITION DIAGNOSIS:

Swallowing difficulty r/t dysphagia as evidenced by pt w/ Downs Syndrome,

PEG dep for all nutritional needs, now intubated, pressor support, ICU

status.



CURRENT TF: Jevity 1.2 @30ml/hr 



ENTERAL NUTRITION RECOMMENDATIONS:

VITAL AF 1.2 @50ml/hr x22 hrs   

to provide 1100ml, 1320 kcal, 83g pro, 892ml free H2O  



- As medically able, start VITAL 1.2 @20ml/hr for critical care and high

pro needs.

- Advance as tolerated to goal when hemodynamically stable

- Hold TF 1 hr before and after synthroid meds

- Flush per MD, HOB over 30 degrees.

-> Rec trophic feeds if not hemodynamically stable.





ADDITIONAL RECOMMENDATIONS:

1) Ht of 61 inches per SNF; recalibrate bed scale for accurate CBW  

2) Monitor BG, lytes, and tolerance TF  

3) Weekly weights on Calibrated bed scale  

4) F/up with WC eval: add DERICK in 4oz H2O BID via GT w/ TF orders 

5) feed w/ stability, currently s/p Code, intubated, Levo @22

## 2020-08-28 NOTE — NUR
NURSE NOTES:

Received report from LAYTON Armstrong. patient in bed resting, responsive to verbal and tactile 
stimuli. Orally intubated 7.5/23 AC 20  FiO2 100 satting  100%.On GT feeding Jevity 
1.2 at 30cc  held GT feeding due to residual of 200cc. HOB elevated. Left femoral TLC 
running Levophed at 22mcg/hr and NS at 100 cc/hr.Valle catheter in place draining clear 
yellow urine with no apparent sediment.Patient turned and repositioned for comfort. contact 
isolation and seizure precaution maintained and observed.  no s/s of hypo/hyperglycemia. Bed 
alarm on. bed locked and in low position. Call light within easy reach.Will continue to 
monitor plan of care.

## 2020-08-28 NOTE — NUR
NURSE NOTES:

Patient asleep, no apparent distress.Continue to titrate Levophed as tolerated.ADLs done, 
mouth care performed and suctioned as tolerated.Turned and repositioned.HOB elevated to 
prevent aspiration.Tolerate well feeding. Call light within easy reach.Will continue close 
monitoring.

## 2020-08-28 NOTE — NUR
NURSE NOTES:

Pt provided with a bed bath, oral care and linen change. Small, black, liquid stool noted. 
Pt tolerated care well. Bedside assessment performed, assessed pt for pain in which she 
currently denies. VS obtained and remain stable at this time. SBP remains >90 and Levophed 
continues infusing at 22 mcg/min without evidence of adverse effects. Urine output noted to 
gradually increase over duration of shift. FiO2 increased to 70% as pt noted to desaturate 
to 87% 

Fall, Aspiration, Seizure and Skin precautions observed. Pt remains resting in bed; Bed 
remains in the lowest position with the safety wheels engaged, call light within reach, side 
rails up x3 and bed alarm activated. Will continue plan of care. Will continue to monitor.

## 2020-08-28 NOTE — NUR
NURSE HAND-OFF REPORT: 



Latest Vital Signs: Temperature 98.8 , Pulse 63 , B/P 100 /42 , Respiratory Rate 20 , O2 SAT 
95 , Endotracheal Tube, O2 Flow Rate 15.0 .  

Vital Sign Comment: FiO2 increased to 70% 



EKG Rhythm: Sinus Rhythm

Rhythm change?: N 

MD Notified?: -

MD Response: N/A



Latest Momin Fall Score: 50  

Fall Risk: High Risk 

Safety Measures: Call light Within Reach, Bed Alarm Zone 2, Side Rails Side Rails x3, Bed 
position Low and Locked.

Fall Precautions: 

Yellow Socks

Door Sign

Patient Fall Education



Report given to Avis TRAYLOR. Endorsed plan of care.

## 2020-08-28 NOTE — NUR
NURSE NOTES:

Pt turned and repositioned for skin management.Levophed increase to 30mcg/hr due to SBP 
below the 60's. Seen by Dr Cherry, made aware oxygen saturation and oxygen on ABG, no new 
orders.Will continue close monitoring.Also charge nurse spoke with legal guardian for PICC 
line consent.As stated she does not give telephone consent and requested consent to be 
faxed.Consent faxed and awaiting for feedback.

## 2020-08-28 NOTE — NUR
NURSE NOTES:

Pt provided with a bed bath, oral care and linen change. Pt tolerated care well. Bedside 
assessment performed, assessed pt with a FLACC scale of 0 noted. VS obtained and remain 
stable at this time. SBP remains >90 and Levophed continues infusing at 22 mcg/min without 
evidence of adverse effects. Lactic Acid ordered along with AM labs per protocol as last 
result was >2

Fall, Aspiration, Seizure and Skin precautions observed. Pt remains resting in bed; Bed 
remains in the lowest position with the safety wheels engaged, call light within reach, side 
rails up x3 and bed alarm activated. Will continue plan of care. Will continue to monitor.

## 2020-08-28 NOTE — NUR
NURSE NOTES:

Pt provided with a bed bath, oral care and linen change. Pt tolerated care well. Bedside 
assessment performed, assessed pt with a FLACC scale of 0 noted. VS obtained and remain 
stable at this time. SBP remains >90 and Levophed continues infusing at 22 mcg/min without 
evidence of adverse effects. Fall, Aspiration and Skin precautions observed. Pt remains 
resting in bed; Bed remains in the lowest position with the safety wheels engaged, call 
light within reach, side rails up x3 and bed alarm activated. Will continue plan of care. 
Will continue to monitor.

## 2020-08-28 NOTE — NUR
NURSE NOTES:

Dr. Cherry here- seen patient - made aware of low BP, Levophed drip at 30 mcg/mi at this 
time - with new order given

## 2020-08-28 NOTE — INFECTIOUS DISEASES PROG NOTE
Assessment/Plan








ASSESSMENT:


sp code blue 8/26





Septic Shock


Fever


Leukocytosis; improving


  -8/26 u/a no pyuria








Pneumonia.- COVID 19 neg x3


   Acute hypoxic resp filure on VM> NRB 15l 100%; hypoxic on ABG> now VDRF 8/26

- Fio2 80% >100%58/28


            -8/26 sp cx normal resp lilliam


             -8/25 CXR: Increased atelectasis of the right lung, since prior 

exam of 3 days earlier. New or increased right pleural effusion. Increased left 

basilar consolidation and/or pleural fluid 


          -COVID Rapid PCR neg 8/23, 8/23, 8/26


          -8/22 spc x Group G strep


          -8/22 CXR:   Reduced lung volumes.  Patchy bilateral predominantly 

interstitial  pulmonary opacities.  Could be from edema and/or pneumonia.  

There is a broader differential.





Persistent, high grade bacteremia-  r/o endocarditis


     -8/22 Bcx 4/4 sets S. haemolyticus; 8/23 Bcx 3/4 S/ epi; 8/26 Bcx NTD


     -2d echo: no vegetaions seen





   R/o probable UTI


          ua/ wbc 10-15, nit neg, leuk +1; ucx Neg





DAVID; worsening


 -supratherapeutic vanco levels





-Seizure disorder.


- Hypothyroidism.


- Down syndrome.





History of PEG tube placement.


[facility]





PLAN:


1. empiric Daptomycin #4 (abx d #7) for GPC bacteremia in view of DAVID


Meropenem#3 (abx d #7) given resp decompensation


add empiric Micafungin #2


Azithromycin #7/7





     8/26 SP Ceftriaxone #2


    8/25 SP IV Vancomycin #4, Zosyn #4


    -8/22 SP Cefepime x1, Flagyl x1





2. Monitor CBC.


3. Monitor BMP.


4. Cocci ab, CrAg


5.f/u  Repeat cx


6. COVID neg x3; will continue isolation for now given ongoing hypoxia and fever


7. Monitor chest x-ray.


8. Monitor the patient's clinical course and labs.  Based on those, we


will do further recommendation.





Thank you, Dr. Cherry, for allowing me to participate in the care of


this patient.  I will follow the patient with you at this


hospitalization.








Discussed with RN.





Subjective


Allergies:  


Coded Allergies:  


     No Known Allergies (Unverified , 1/8/19)


Tm 100.5


FIo2 on 100%


wbc improving


latest Bcx NTD


maxed on levophed





Objective





Last 24 Hour Vital Signs








  Date Time  Temp Pulse Resp B/P (MAP) Pulse Ox O2 Delivery O2 Flow Rate FiO2


 


8/28/20 11:46    56/22    


 


8/28/20 10:00  68 22 97/47 (64) 98   


 


8/28/20 09:30  73 21 98/51 (67) 98   


 


8/28/20 09:00  73 22 94/53 (67) 97   


 


8/28/20 08:30  70 20 92/47 (62) 96   


 


8/28/20 08:00  81      


 


8/28/20 08:00      Endotracheal Tube  


 


8/28/20 08:00  74 22 108/66 (80) 100   


 


8/28/20 08:00        100


 


8/28/20 07:30 100.5 66 20 108/49 (68) 94   


 


8/28/20 07:25  62 20     100


 


8/28/20 07:00    101/45    


 


8/28/20 07:00  63 20 101/45 (63) 92   


 


8/28/20 06:30  63 20 100/42 (61) 95   


 


8/28/20 06:00    98/39    


 


8/28/20 06:00  70 20 98/39 (58) 94   


 


8/28/20 05:30  73 21 107/52 (70) 92   


 


8/28/20 05:20        70


 


8/28/20 05:14  67 20     70


 


8/28/20 05:03    96/46    


 


8/28/20 05:02    96/43    


 


8/28/20 05:00    96/43    


 


8/28/20 05:00  66 20 96/43 (60) 88   


 


8/28/20 04:30  67 20 94/43 (60) 92   


 


8/28/20 04:00    104/41    


 


8/28/20 04:00 98.8 71 20 104/41 (62) 91   


 


8/28/20 04:00      Endotracheal Tube  


 


8/28/20 04:00        60


 


8/28/20 03:31  68 20     60


 


8/28/20 03:30  68 20 107/55 (72) 97   


 


8/28/20 03:15  69      


 


8/28/20 03:00    99/40    


 


8/28/20 03:00  72 20 99/40 (59) 89   


 


8/28/20 02:30  72 19 104/41 (62) 90   


 


8/28/20 02:00    98/46    


 


8/28/20 02:00  72 21 98/46 (63) 92   


 


8/28/20 01:39  68 20     60


 


8/28/20 01:30  67 20 93/39 (57) 93   


 


8/28/20 01:00  71 20 108/49 (68) 97   


 


8/28/20 01:00    108/49    


 


8/28/20 00:30  65 20 106/51 (69) 95   


 


8/28/20 00:00      Endotracheal Tube  


 


8/28/20 00:00 99.1 69 20 104/40 (61) 94   


 


8/28/20 00:00    104/40    


 


8/28/20 00:00        60


 


8/27/20 23:32  74 20     60


 


8/27/20 23:30  68 20 109/52 (71) 93   


 


8/27/20 23:22  62 19 129/59 (82) 93   


 


8/27/20 23:17    94/38    


 


8/27/20 23:16    94/38    


 


8/27/20 23:15  71      


 


8/27/20 23:15  71 20 64/35 (45) 87   


 


8/27/20 23:00    98/42    


 


8/27/20 23:00  66 20 98/42 (60) 89   


 


8/27/20 22:30  66 20 98/44 (62) 91   


 


8/27/20 22:00  65 20 90/38 (55) 93   


 


8/27/20 22:00    90/38    


 


8/27/20 21:30  75 21 104/46 (65) 97   


 


8/27/20 21:21  71 20     60


 


8/27/20 21:00  78 22 98/61 (73) 99   


 


8/27/20 21:00    103/55    


 


8/27/20 20:30  80 19 100/85 (90) 99   


 


8/27/20 20:00      Endotracheal Tube  


 


8/27/20 20:00        60


 


8/27/20 20:00    93/38    


 


8/27/20 20:00 99.0 62 20 93/38 (56) 97   


 


8/27/20 19:30  59 20 98/46 (63) 98   


 


8/27/20 19:28  61 20     60


 


8/27/20 19:21  61      


 


8/27/20 19:00  61 20 97/41 (59) 94   


 


8/27/20 19:00    98/37    


 


8/27/20 18:30  61 20 98/37 (57) 93   


 


8/27/20 18:00    104/42    


 


8/27/20 18:00  60 20 104/42 (62) 94   


 


8/27/20 17:49    102/42    


 


8/27/20 17:30  58 20 101/46 (64) 96   


 


8/27/20 17:00    103/49    


 


8/27/20 17:00  58 20 103/49 (67) 100   


 


8/27/20 16:43  70 20     60


 


8/27/20 16:30 98.5 58 20 81/48 (59) 99   


 


8/27/20 16:00      Endotracheal Tube  


 


8/27/20 16:00  61      


 


8/27/20 16:00    114/46    


 


8/27/20 16:00  55 20 123/86 (98) 94   


 


8/27/20 16:00        60


 


8/27/20 15:30  58 20 112/49 (70) 91   


 


8/27/20 15:00    113/62    


 


8/27/20 15:00  65 20 113/62 (79) 99   


 


8/27/20 14:50  59 20     50


 


8/27/20 14:30  60 20 120/55 (76) 98   


 


8/27/20 14:00    116/59    


 


8/27/20 14:00  62 20 116/58 (77) 100   


 


8/27/20 13:30  65 15 113/47 (69) 100   


 


8/27/20 13:15  64 20     70


 


8/27/20 13:00    116/46    


 


8/27/20 13:00  60 20 116/46 (69) 96   





  60      


 


8/27/20 12:45  57 20 110/47 (68) 96   


 


8/27/20 12:30 99.5 58 20 102/48 (66) 95   








Height (Feet):  5


Height (Inches):  3.00


Weight (Pounds):  149


HEENT:  No pale conjunctivae.  No icterus. ETT in place


NECK:  No lymphadenopathy.


CHEST:  Coarse breathing sounds.


HEART:  S1 and S2.


ABDOMEN:  Soft.  PEG tube in place.


EXTREMITIES:  No cyanosis at this time .





Microbiology








 Date/Time


Source Procedure


Growth Status


 


 


 8/26/20 04:50


Blood Blood Culture - Preliminary


NO GROWTH AFTER 24 HOURS Resulted


 


 8/26/20 04:45


Blood Blood Culture - Preliminary


NO GROWTH AFTER 24 HOURS Resulted


 


 8/26/20 13:30


Nasal Nares MRSA Culture - Final


NO METHICILLIN RESISTANT STAPH AUREUS... Complete





 8/26/20 13:30


Sputum Expectorated Gram Stain - Final Complete


 


 8/26/20 13:30


Sputum Expectorated Sputum Culture - Final


NORMAL UPPER RESPIRATORY LILLIAM PRESENT Complete


 


 8/26/20 13:20


Nasopharynx SARS-CoV-2 RdRp Gene Assay - Final Complete











Laboratory Tests








Test


  8/27/20


15:20 8/28/20


04:05 8/28/20


08:10


 


Lactic Acid Level


  2.50 mmol/L


(0.4-2.0)  H 1.90 mmol/L


(0.4-2.0) 


 


 


White Blood Count


  


  15.4 K/UL


(4.8-10.8)  H 


 


 


Red Blood Count


  


  3.21 M/UL


(4.20-5.40)  L 


 


 


Hemoglobin


  


  11.2 G/DL


(12.0-16.0)  L 


 


 


Hematocrit


  


  33.1 %


(37.0-47.0)  L 


 


 


Mean Corpuscular Volume


  


  103 FL (80-99)


H 


 


 


Mean Corpuscular Hemoglobin


  


  34.9 PG


(27.0-31.0)  H 


 


 


Mean Corpuscular Hemoglobin


Concent 


  33.9 G/DL


(32.0-36.0) 


 


 


Red Cell Distribution Width


  


  12.6 %


(11.6-14.8) 


 


 


Platelet Count


  


  201 K/UL


(150-450) 


 


 


Mean Platelet Volume


  


  6.7 FL


(6.5-10.1) 


 


 


Neutrophils (%) (Auto)


  


  % (45.0-75.0)


  


 


 


Lymphocytes (%) (Auto)


  


  % (20.0-45.0)


  


 


 


Monocytes (%) (Auto)   % (1.0-10.0)   


 


Eosinophils (%) (Auto)   % (0.0-3.0)   


 


Basophils (%) (Auto)   % (0.0-2.0)   


 


Sodium Level


  


  136 MMOL/L


(136-145)  # 


 


 


Potassium Level


  


  3.5 MMOL/L


(3.5-5.1) 


 


 


Chloride Level


  


  105 MMOL/L


() 


 


 


Carbon Dioxide Level


  


  20 MMOL/L


(21-32)  L 


 


 


Anion Gap


  


  11 mmol/L


(5-15) 


 


 


Blood Urea Nitrogen


  


  18 mg/dL


(7-18) 


 


 


Creatinine


  


  2.0 MG/DL


(0.55-1.30)  H 


 


 


Estimat Glomerular Filtration


Rate 


  25.6 mL/min


(>60) 


 


 


Glucose Level


  


  112 MG/DL


()  H 


 


 


Calcium Level


  


  7.6 MG/DL


(8.5-10.1)  L 


 


 


Phosphorus Level


  


  2.3 MG/DL


(2.5-4.9)  L 


 


 


Magnesium Level


  


  1.8 MG/DL


(1.8-2.4) 


 


 


Total Bilirubin


  


  0.2 MG/DL


(0.2-1.0) 


 


 


Aspartate Amino Transf


(AST/SGOT) 


  34 U/L (15-37)


  


 


 


Alanine Aminotransferase


(ALT/SGPT) 


  9 U/L (12-78)


L 


 


 


Alkaline Phosphatase


  


  55 U/L


() 


 


 


Total Protein


  


  5.0 G/DL


(6.4-8.2)  L 


 


 


Albumin


  


  1.3 G/DL


(3.4-5.0)  L 


 


 


Globulin  3.7 g/dL   


 


Albumin/Globulin Ratio


  


  0.4 (1.0-2.7)


L 


 


 


Arterial Blood pH


  


  


  7.370


(7.350-7.450)


 


Arterial Blood Partial


Pressure CO2 


  


  33.2 mmHg


(35.0-45.0)  L


 


Arterial Blood Partial


Pressure O2 


  


  74.9 mmHg


(75.0-100.0)  L


 


Arterial Blood HCO3


  


  


  18.8 mmol/L


(22.0-26.0)  L


 


Arterial Blood Oxygen


Saturation 


  


  94.5 %


()  L


 


Arterial Blood Base Excess   -5.7 (-2-2)  L


 


Cole Test   Positive  











Current Medications








 Medications


  (Trade)  Dose


 Ordered  Sig/Didi


 Route


 PRN Reason  Start Time


 Stop Time Status Last Admin


Dose Admin


 


 Acetaminophen


  (Tylenol)  650 mg  Q4H  PRN


 GT


 FEVER  8/26/20 11:45


 9/25/20 11:44  8/28/20 10:31


 


 


 Azithromycin 500


 mg/Dextrose  275 ml @ 


 275 mls/hr  Q24HRS


 IV


   8/26/20 14:00


 8/29/20 14:59  8/27/20 14:12


 


 


 Chlorhexidine


 Gluconate


  (Beatrice-Hex 2%)  1 applic  DAILY@2000


 TOPIC


   8/26/20 20:00


 11/24/20 19:59  8/27/20 20:19


 


 


 Daptomycin 350 mg/


 Sodium Chloride  55 ml @ 


 100 mls/hr  Q48H


 IV


   8/28/20 11:00


 9/1/20 10:59  8/28/20 10:31


 


 


 Dextrose


  (Dextrose 50%)  25 ml  Q30M  PRN


 IV


 Hypoglycemia  8/26/20 11:30


 11/20/20 11:29   


 


 


 Dextrose


  (Dextrose 50%)  50 ml  Q30M  PRN


 IV


 Hypoglycemia  8/26/20 11:30


 11/20/20 11:29   


 


 


 Heparin Sodium


  (Porcine)


  (Heparin 5000


 units/ml)  5,000 units  EVERY 12  HOURS


 SUBQ


   8/26/20 21:00


 10/6/20 20:59  8/28/20 09:12


 


 


 Heparin Sodium/


 Sodium Chloride


  (Heparin 1000


 units/500ml


 Premix)  1,000 unit  ONCE  PRN


 IV


 radiology  8/26/20 16:15


 8/28/20 18:00   


 


 


 Levothyroxine


 Sodium


  (Synthroid)  75 mcg  DAILY


 GT


   8/27/20 09:00


 9/22/20 08:59  8/28/20 09:11


 


 


 Lidocaine HCl


  (Xylocaine 1%


 30ml)  30 ml  ONCE  PRN


 INJ


 radioloty  8/26/20 16:15


 8/28/20 18:00   


 


 


 Meropenem 1 gm/


 Sodium Chloride  55 ml @ 


 110 mls/hr  Q12H


 IVPB


   8/26/20 16:00


 8/31/20 15:59  8/28/20 04:07


 


 


 Micafungin Sodium


 100 mg/Sodium


 Chloride  110 ml @ 


 110 mls/hr  Q24H


 IVPB


   8/27/20 14:00


 9/3/20 13:59  8/27/20 14:12


 


 


 Midodrine


  (Pro-Amatine)  10 mg  Q8HR


 GT


   8/28/20 14:00


 11/26/20 13:59   


 


 


 Norepinephrine


 Bitartrate 8 mg/


 Dextrose  558 ml @ 0


 mls/hr  Q24H


 IV


   8/27/20 09:00


 9/26/20 08:59  8/28/20 11:46


 


 


 Ondansetron HCl


  (Zofran)  4 mg  Q6H  PRN


 IVP


 Nausea & Vomiting  8/26/20 12:00


 9/21/20 11:59   


 


 


 Pantoprazole


  (Protonix)  40 mg  DAILY


 IV


   8/27/20 09:00


 9/22/20 08:59  8/28/20 09:11


 


 


 Polyethylene


 Glycol


  (Miralax)  17 gm  DAILYPRN  PRN


 GT


 Constipation  8/26/20 12:00


 9/21/20 11:59   


 


 


 Potassium


 Phosphate 20 mm/


 Sodium Chloride  281.6667


 ml @ 


 46.944 m...  ONCE  ONCE


 IV


   8/28/20 09:00


 8/28/20 14:59  8/28/20 09:13


 


 


 Sodium Chloride  1,000 ml @ 


 100 mls/hr  Q10H


 IV


   8/26/20 23:00


 9/25/20 22:59  8/28/20 04:08


 


 


 Sodium Chloride  1,000 ml @ 


 999 mls/hr  Q1H1M


 IV


   8/28/20 12:15


 8/28/20 13:15   


 


 


 Temazepam


  (Restoril)  15 mg  HSPRN  PRN


 GT


 Insomnia  8/26/20 21:00


 8/29/20 20:59   


 


 


 Valproic Acid


  (Depakene)  500 mg  EVERY 8  HOURS


 GT


   8/26/20 14:00


 9/21/20 21:59  8/28/20 05:03


 

















Rema Gomes M.D. Aug 28, 2020 12:20

## 2020-08-28 NOTE — NUR
CASE MANAGEMENT: REVIEW



08/28/20



SI: ACUTE RESPIRATORY FAILURE .S/P CODE BLUE 8/26 AND INTUBATED . HCAP .SEPSIS . 

PHX: ENCEPHALOPATHY. DOWN SYNDROME . SEIZURE DISORDER

100.5   66   20   108/49   94% ENDOTRACHEAL FiO2 100

WBC 15.4   CO2 20   CREAT 2.0  CA+ 7.6  PHOS 2.3   ALB 1.3

ABG: CO2 33.2   PO2 74.9   HCO3 18.8   O2 SAT 94.5  BASE EXCESS -5.7



IS: IV KPHOS/NS X1

LEVOPHED GTT PROTOCOL

IV DAPTOMYCIN Q48HR

IV MEROPENEM BID

IV AZITHROMYCIN QD

IVF NS @100ML/HR

IV SYNTHROID GT QAC

IV DEPAKENE GT TID

PRO-AMATINE GT Q6HR 



\**: ICU 

DCP: PATIENT IS FROM [facility]

PLAN: 

STABLE BP AND RESPIRATORY STATUS 

CONTROL FEVER

## 2020-08-29 VITALS — SYSTOLIC BLOOD PRESSURE: 111 MMHG | DIASTOLIC BLOOD PRESSURE: 45 MMHG

## 2020-08-29 VITALS — SYSTOLIC BLOOD PRESSURE: 115 MMHG | DIASTOLIC BLOOD PRESSURE: 72 MMHG

## 2020-08-29 VITALS — DIASTOLIC BLOOD PRESSURE: 38 MMHG | SYSTOLIC BLOOD PRESSURE: 127 MMHG

## 2020-08-29 VITALS — SYSTOLIC BLOOD PRESSURE: 124 MMHG | DIASTOLIC BLOOD PRESSURE: 78 MMHG

## 2020-08-29 VITALS — DIASTOLIC BLOOD PRESSURE: 50 MMHG | SYSTOLIC BLOOD PRESSURE: 110 MMHG

## 2020-08-29 VITALS — SYSTOLIC BLOOD PRESSURE: 108 MMHG | DIASTOLIC BLOOD PRESSURE: 52 MMHG

## 2020-08-29 VITALS — SYSTOLIC BLOOD PRESSURE: 105 MMHG | DIASTOLIC BLOOD PRESSURE: 50 MMHG

## 2020-08-29 VITALS — SYSTOLIC BLOOD PRESSURE: 76 MMHG | DIASTOLIC BLOOD PRESSURE: 49 MMHG

## 2020-08-29 VITALS — DIASTOLIC BLOOD PRESSURE: 49 MMHG | SYSTOLIC BLOOD PRESSURE: 109 MMHG

## 2020-08-29 VITALS — DIASTOLIC BLOOD PRESSURE: 80 MMHG | SYSTOLIC BLOOD PRESSURE: 119 MMHG

## 2020-08-29 VITALS — DIASTOLIC BLOOD PRESSURE: 62 MMHG | SYSTOLIC BLOOD PRESSURE: 90 MMHG

## 2020-08-29 VITALS — DIASTOLIC BLOOD PRESSURE: 51 MMHG | SYSTOLIC BLOOD PRESSURE: 91 MMHG

## 2020-08-29 VITALS — SYSTOLIC BLOOD PRESSURE: 101 MMHG | DIASTOLIC BLOOD PRESSURE: 59 MMHG

## 2020-08-29 VITALS — DIASTOLIC BLOOD PRESSURE: 48 MMHG | SYSTOLIC BLOOD PRESSURE: 105 MMHG

## 2020-08-29 VITALS — SYSTOLIC BLOOD PRESSURE: 83 MMHG | DIASTOLIC BLOOD PRESSURE: 54 MMHG

## 2020-08-29 VITALS — DIASTOLIC BLOOD PRESSURE: 63 MMHG | SYSTOLIC BLOOD PRESSURE: 96 MMHG

## 2020-08-29 VITALS — SYSTOLIC BLOOD PRESSURE: 107 MMHG | DIASTOLIC BLOOD PRESSURE: 54 MMHG

## 2020-08-29 VITALS — SYSTOLIC BLOOD PRESSURE: 118 MMHG | DIASTOLIC BLOOD PRESSURE: 74 MMHG

## 2020-08-29 VITALS — SYSTOLIC BLOOD PRESSURE: 91 MMHG | DIASTOLIC BLOOD PRESSURE: 72 MMHG

## 2020-08-29 VITALS — SYSTOLIC BLOOD PRESSURE: 110 MMHG | DIASTOLIC BLOOD PRESSURE: 73 MMHG

## 2020-08-29 VITALS — SYSTOLIC BLOOD PRESSURE: 103 MMHG | DIASTOLIC BLOOD PRESSURE: 63 MMHG

## 2020-08-29 VITALS — SYSTOLIC BLOOD PRESSURE: 92 MMHG | DIASTOLIC BLOOD PRESSURE: 43 MMHG

## 2020-08-29 VITALS — DIASTOLIC BLOOD PRESSURE: 112 MMHG | SYSTOLIC BLOOD PRESSURE: 154 MMHG

## 2020-08-29 VITALS — DIASTOLIC BLOOD PRESSURE: 53 MMHG | SYSTOLIC BLOOD PRESSURE: 103 MMHG

## 2020-08-29 VITALS — DIASTOLIC BLOOD PRESSURE: 29 MMHG | SYSTOLIC BLOOD PRESSURE: 126 MMHG

## 2020-08-29 VITALS — SYSTOLIC BLOOD PRESSURE: 110 MMHG | DIASTOLIC BLOOD PRESSURE: 46 MMHG

## 2020-08-29 VITALS — DIASTOLIC BLOOD PRESSURE: 48 MMHG | SYSTOLIC BLOOD PRESSURE: 104 MMHG

## 2020-08-29 VITALS — DIASTOLIC BLOOD PRESSURE: 47 MMHG | SYSTOLIC BLOOD PRESSURE: 92 MMHG

## 2020-08-29 VITALS — DIASTOLIC BLOOD PRESSURE: 55 MMHG | SYSTOLIC BLOOD PRESSURE: 117 MMHG

## 2020-08-29 VITALS — DIASTOLIC BLOOD PRESSURE: 58 MMHG | SYSTOLIC BLOOD PRESSURE: 111 MMHG

## 2020-08-29 VITALS — SYSTOLIC BLOOD PRESSURE: 100 MMHG | DIASTOLIC BLOOD PRESSURE: 37 MMHG

## 2020-08-29 VITALS — DIASTOLIC BLOOD PRESSURE: 40 MMHG | SYSTOLIC BLOOD PRESSURE: 119 MMHG

## 2020-08-29 VITALS — SYSTOLIC BLOOD PRESSURE: 118 MMHG | DIASTOLIC BLOOD PRESSURE: 102 MMHG

## 2020-08-29 VITALS — DIASTOLIC BLOOD PRESSURE: 68 MMHG | SYSTOLIC BLOOD PRESSURE: 111 MMHG

## 2020-08-29 VITALS — SYSTOLIC BLOOD PRESSURE: 105 MMHG | DIASTOLIC BLOOD PRESSURE: 54 MMHG

## 2020-08-29 VITALS — DIASTOLIC BLOOD PRESSURE: 67 MMHG | SYSTOLIC BLOOD PRESSURE: 110 MMHG

## 2020-08-29 VITALS — DIASTOLIC BLOOD PRESSURE: 39 MMHG | SYSTOLIC BLOOD PRESSURE: 122 MMHG

## 2020-08-29 VITALS — SYSTOLIC BLOOD PRESSURE: 55 MMHG | DIASTOLIC BLOOD PRESSURE: 31 MMHG

## 2020-08-29 VITALS — DIASTOLIC BLOOD PRESSURE: 47 MMHG | SYSTOLIC BLOOD PRESSURE: 109 MMHG

## 2020-08-29 VITALS — SYSTOLIC BLOOD PRESSURE: 106 MMHG | DIASTOLIC BLOOD PRESSURE: 50 MMHG

## 2020-08-29 VITALS — DIASTOLIC BLOOD PRESSURE: 49 MMHG | SYSTOLIC BLOOD PRESSURE: 116 MMHG

## 2020-08-29 VITALS — DIASTOLIC BLOOD PRESSURE: 56 MMHG | SYSTOLIC BLOOD PRESSURE: 95 MMHG

## 2020-08-29 VITALS — DIASTOLIC BLOOD PRESSURE: 84 MMHG | SYSTOLIC BLOOD PRESSURE: 102 MMHG

## 2020-08-29 VITALS — SYSTOLIC BLOOD PRESSURE: 128 MMHG | DIASTOLIC BLOOD PRESSURE: 80 MMHG

## 2020-08-29 VITALS — SYSTOLIC BLOOD PRESSURE: 121 MMHG | DIASTOLIC BLOOD PRESSURE: 78 MMHG

## 2020-08-29 VITALS — SYSTOLIC BLOOD PRESSURE: 101 MMHG | DIASTOLIC BLOOD PRESSURE: 72 MMHG

## 2020-08-29 VITALS — SYSTOLIC BLOOD PRESSURE: 96 MMHG | DIASTOLIC BLOOD PRESSURE: 52 MMHG

## 2020-08-29 VITALS — DIASTOLIC BLOOD PRESSURE: 57 MMHG | SYSTOLIC BLOOD PRESSURE: 99 MMHG

## 2020-08-29 VITALS — SYSTOLIC BLOOD PRESSURE: 109 MMHG | DIASTOLIC BLOOD PRESSURE: 39 MMHG

## 2020-08-29 VITALS — DIASTOLIC BLOOD PRESSURE: 47 MMHG | SYSTOLIC BLOOD PRESSURE: 118 MMHG

## 2020-08-29 VITALS — DIASTOLIC BLOOD PRESSURE: 90 MMHG | SYSTOLIC BLOOD PRESSURE: 115 MMHG

## 2020-08-29 VITALS — SYSTOLIC BLOOD PRESSURE: 104 MMHG | DIASTOLIC BLOOD PRESSURE: 55 MMHG

## 2020-08-29 VITALS — DIASTOLIC BLOOD PRESSURE: 49 MMHG | SYSTOLIC BLOOD PRESSURE: 104 MMHG

## 2020-08-29 VITALS — DIASTOLIC BLOOD PRESSURE: 43 MMHG | SYSTOLIC BLOOD PRESSURE: 103 MMHG

## 2020-08-29 VITALS — DIASTOLIC BLOOD PRESSURE: 41 MMHG | SYSTOLIC BLOOD PRESSURE: 100 MMHG

## 2020-08-29 VITALS — DIASTOLIC BLOOD PRESSURE: 57 MMHG | SYSTOLIC BLOOD PRESSURE: 102 MMHG

## 2020-08-29 VITALS — DIASTOLIC BLOOD PRESSURE: 66 MMHG | SYSTOLIC BLOOD PRESSURE: 98 MMHG

## 2020-08-29 VITALS — DIASTOLIC BLOOD PRESSURE: 51 MMHG | SYSTOLIC BLOOD PRESSURE: 104 MMHG

## 2020-08-29 VITALS — SYSTOLIC BLOOD PRESSURE: 113 MMHG | DIASTOLIC BLOOD PRESSURE: 52 MMHG

## 2020-08-29 VITALS — DIASTOLIC BLOOD PRESSURE: 45 MMHG | SYSTOLIC BLOOD PRESSURE: 107 MMHG

## 2020-08-29 VITALS — DIASTOLIC BLOOD PRESSURE: 68 MMHG | SYSTOLIC BLOOD PRESSURE: 94 MMHG

## 2020-08-29 VITALS — DIASTOLIC BLOOD PRESSURE: 36 MMHG | SYSTOLIC BLOOD PRESSURE: 108 MMHG

## 2020-08-29 VITALS — DIASTOLIC BLOOD PRESSURE: 64 MMHG | SYSTOLIC BLOOD PRESSURE: 94 MMHG

## 2020-08-29 VITALS — SYSTOLIC BLOOD PRESSURE: 95 MMHG | DIASTOLIC BLOOD PRESSURE: 41 MMHG

## 2020-08-29 VITALS — DIASTOLIC BLOOD PRESSURE: 54 MMHG | SYSTOLIC BLOOD PRESSURE: 110 MMHG

## 2020-08-29 VITALS — SYSTOLIC BLOOD PRESSURE: 112 MMHG | DIASTOLIC BLOOD PRESSURE: 85 MMHG

## 2020-08-29 VITALS — SYSTOLIC BLOOD PRESSURE: 143 MMHG | DIASTOLIC BLOOD PRESSURE: 108 MMHG

## 2020-08-29 VITALS — SYSTOLIC BLOOD PRESSURE: 120 MMHG | DIASTOLIC BLOOD PRESSURE: 93 MMHG

## 2020-08-29 VITALS — SYSTOLIC BLOOD PRESSURE: 113 MMHG | DIASTOLIC BLOOD PRESSURE: 62 MMHG

## 2020-08-29 VITALS — DIASTOLIC BLOOD PRESSURE: 62 MMHG | SYSTOLIC BLOOD PRESSURE: 83 MMHG

## 2020-08-29 VITALS — SYSTOLIC BLOOD PRESSURE: 104 MMHG | DIASTOLIC BLOOD PRESSURE: 42 MMHG

## 2020-08-29 VITALS — DIASTOLIC BLOOD PRESSURE: 44 MMHG | SYSTOLIC BLOOD PRESSURE: 99 MMHG

## 2020-08-29 VITALS — SYSTOLIC BLOOD PRESSURE: 111 MMHG | DIASTOLIC BLOOD PRESSURE: 48 MMHG

## 2020-08-29 LAB
ADD MANUAL DIFF: YES
ALBUMIN SERPL-MCNC: 1.5 G/DL (ref 3.4–5)
ALBUMIN/GLOB SERPL: 0.5 {RATIO} (ref 1–2.7)
ALP SERPL-CCNC: 60 U/L (ref 46–116)
ALT SERPL-CCNC: 16 U/L (ref 12–78)
ANION GAP SERPL CALC-SCNC: 9 MMOL/L (ref 5–15)
AST SERPL-CCNC: 32 U/L (ref 15–37)
BILIRUB SERPL-MCNC: 0.2 MG/DL (ref 0.2–1)
BUN SERPL-MCNC: 16 MG/DL (ref 7–18)
CALCIUM SERPL-MCNC: 7.1 MG/DL (ref 8.5–10.1)
CHLORIDE SERPL-SCNC: 107 MMOL/L (ref 98–107)
CO2 SERPL-SCNC: 20 MMOL/L (ref 21–32)
CREAT SERPL-MCNC: 1.9 MG/DL (ref 0.55–1.3)
ERYTHROCYTE [DISTWIDTH] IN BLOOD BY AUTOMATED COUNT: 13.2 % (ref 11.6–14.8)
GLOBULIN SER-MCNC: 3.2 G/DL
HCT VFR BLD CALC: 32.4 % (ref 37–47)
HGB BLD-MCNC: 10.7 G/DL (ref 12–16)
MCV RBC AUTO: 104 FL (ref 80–99)
PHOSPHATE SERPL-MCNC: 3.3 MG/DL (ref 2.5–4.9)
PLATELET # BLD: 174 K/UL (ref 150–450)
POTASSIUM SERPL-SCNC: 3.2 MMOL/L (ref 3.5–5.1)
RBC # BLD AUTO: 3.13 M/UL (ref 4.2–5.4)
SODIUM SERPL-SCNC: 136 MMOL/L (ref 136–145)
WBC # BLD AUTO: 19.4 K/UL (ref 4.8–10.8)

## 2020-08-29 RX ADMIN — MEROPENEM SCH MLS/HR: 1 INJECTION INTRAVENOUS at 16:10

## 2020-08-29 RX ADMIN — ACETAMINOPHEN PRN MG: 160 SOLUTION ORAL at 23:15

## 2020-08-29 RX ADMIN — SODIUM CHLORIDE SCH MLS/HR: 900 INJECTION, SOLUTION INTRAVENOUS at 09:00

## 2020-08-29 RX ADMIN — CHLORHEXIDINE GLUCONATE SCH APPLIC: 213 SOLUTION TOPICAL at 20:17

## 2020-08-29 RX ADMIN — SODIUM CHLORIDE SCH MG: 0.9 INJECTION INTRAVENOUS at 08:50

## 2020-08-29 RX ADMIN — SODIUM CHLORIDE SCH MLS/HR: 900 INJECTION, SOLUTION INTRAVENOUS at 13:59

## 2020-08-29 RX ADMIN — DEXTROSE MONOHYDRATE SCH MLS/HR: 50 INJECTION, SOLUTION INTRAVENOUS at 13:15

## 2020-08-29 RX ADMIN — PHENYLEPHRINE HYDROCHLORIDE PRN MLS/HR: 10 INJECTION INTRAVENOUS at 22:27

## 2020-08-29 RX ADMIN — MEROPENEM SCH MLS/HR: 1 INJECTION INTRAVENOUS at 03:27

## 2020-08-29 RX ADMIN — IPRATROPIUM BROMIDE AND ALBUTEROL SULFATE SCH ML: .5; 3 SOLUTION RESPIRATORY (INHALATION) at 12:34

## 2020-08-29 RX ADMIN — MIDODRINE HYDROCHLORIDE SCH MG: 10 TABLET ORAL at 05:16

## 2020-08-29 RX ADMIN — SODIUM CHLORIDE SCH MG: 0.9 INJECTION INTRAVENOUS at 01:49

## 2020-08-29 RX ADMIN — VALPROIC ACID SCH MG: 250 SOLUTION ORAL at 05:16

## 2020-08-29 RX ADMIN — MIDODRINE HYDROCHLORIDE SCH MG: 10 TABLET ORAL at 21:32

## 2020-08-29 RX ADMIN — IPRATROPIUM BROMIDE AND ALBUTEROL SULFATE SCH ML: .5; 3 SOLUTION RESPIRATORY (INHALATION) at 18:51

## 2020-08-29 RX ADMIN — SODIUM CHLORIDE SCH MLS/HR: 900 INJECTION, SOLUTION INTRAVENOUS at 23:34

## 2020-08-29 RX ADMIN — HEPARIN SODIUM SCH UNITS: 5000 INJECTION INTRAVENOUS; SUBCUTANEOUS at 08:51

## 2020-08-29 RX ADMIN — MIDODRINE HYDROCHLORIDE SCH MG: 10 TABLET ORAL at 13:22

## 2020-08-29 RX ADMIN — SODIUM CHLORIDE SCH MLS/HR: 900 INJECTION, SOLUTION INTRAVENOUS at 19:00

## 2020-08-29 RX ADMIN — VALPROIC ACID SCH MG: 250 SOLUTION ORAL at 21:32

## 2020-08-29 RX ADMIN — PANTOPRAZOLE SODIUM SCH MG: 40 INJECTION, POWDER, FOR SOLUTION INTRAVENOUS at 08:50

## 2020-08-29 RX ADMIN — SODIUM CHLORIDE SCH MLS/HR: 900 INJECTION, SOLUTION INTRAVENOUS at 03:27

## 2020-08-29 RX ADMIN — VALPROIC ACID SCH MG: 250 SOLUTION ORAL at 13:15

## 2020-08-29 NOTE — NUR
NURSE NOTES:

patient temp 99.5 axillary cooling measure provided. will continue to monitor patient.

## 2020-08-29 NOTE — NUR
NURSE NOTES:

Dr Gomes notified of blood cutlure results positive for gram + cocci in [facility]custers. No new 
orders received. 

-------------------------------------------------------------------------------

Addendum: 08/29/20 at 1141 by Rylie Smith RN

-------------------------------------------------------------------------------

Late entry: Joyce Welch also made aware that pt's labial folds are erythematous and 
indurated - she states continue to apply the phytoplex antifungal cream. Cream applied per 
order.

## 2020-08-29 NOTE — NEPHROLOGY PROGRESS NOTE
Assessment/Plan


Problem List:  


(1) DAVID (acute kidney injury)


(2) Respiratory failure requiring intubation


(3) Down's syndrome


(4) Seizure disorder


(5) Hypothyroidism


Assessment





Acute renal failure, likely due to hypotension


Acute respiratory distress, hypoxia


Seizure disorder


Hypothyroidism


Down syndrome


Full code











Fluid challenge with IV fluids and albumin


Midodrine for BP above 100 systolic


Check TSH level


Check


Correct level


Monitor renal parameters


Urine studies


Per orders


Plan


August 29: Remains intubated.  Labs reviewed.  Creatinine 1.9.  Blood pressure 

systolic 90s.  Continue per consultants.


August 28: Remains intubated.  Labs reviewed.  Serum creatinine lower to 2.  

Vancomycin level lower.  Remains hypotensive on pressors.  Will increase 

midodrine to 10 mg every 8 hours.  Continue per consultants.  Continue to 

monitor renal parameters.


August 27: Patient now in ICU.  Intubated.  On pressors.  Labs reviewed.  Will 

increase midodrine.  Aim to keep blood pressure over 100 systolic.  Will give 

albumin bolus.  Will check vancomycin level which was elevated when checked 

previously on August 24.  Will monitor renal parameters.  Continue per 

consultants.





Subjective


ROS Limited/Unobtainable:  Yes





Objective


Objective





Last 24 Hour Vital Signs








  Date Time  Temp Pulse Resp B/P (MAP) Pulse Ox O2 Delivery O2 Flow Rate FiO2


 


8/29/20 11:00  83 24 99/44 (62) 89   


 


8/29/20 10:45  91 27     70


 


8/29/20 10:00  81 23 100/37 (58) 91   


 


8/29/20 09:16        85


 


8/29/20 09:15  75 21 101/72 (82) 94   


 


8/29/20 09:00  77 22 91/72 (78) 93   


 


8/29/20 09:00    91/56    


 


8/29/20 08:00      Mechanical Ventilator  





      Mechanical Ventilator  


 


8/29/20 08:00 98.7 78 21 120/93 (102) 94   


 


8/29/20 08:00  71      


 


8/29/20 08:00        80


 


8/29/20 07:48  78 22     70


 


8/29/20 07:45  78 22 90/62 (71) 100   


 


8/29/20 07:30  74 21 115/90 (98) 97   


 


8/29/20 07:15  59 20 117/55 (75) 97   


 


8/29/20 07:00    117/55    


 


8/29/20 07:00  61 20 110/54 (72) 96   


 


8/29/20 06:30  63 20 110/46 (67) 96   


 


8/29/20 06:00    106/54    


 


8/29/20 06:00  62 20 108/52 (70) 96   


 


8/29/20 05:30  64 20 105/50 (68) 95   


 


8/29/20 05:00  65 20 105/48 (67) 94   


 


8/29/20 05:00    109/42    


 


8/29/20 04:36  66 20     100


 


8/29/20 04:30  66 20 106/50 (68) 93   


 


8/29/20 04:00 99.5 68 20 104/48 (66) 92   


 


8/29/20 04:00      Endotracheal Tube  


 


8/29/20 04:00    111/58    


 


8/29/20 04:00        100


 


8/29/20 03:58  70      


 


8/29/20 03:45  69 20 105/48 (67) 92   


 


8/29/20 03:30  70 20 104/49 (67) 92   


 


8/29/20 03:27    116/49    


 


8/29/20 03:15  71 21 116/49 (71) 92   


 


8/29/20 03:03  67 22     100


 


8/29/20 03:00  73 23 113/52 (72) 94   


 


8/29/20 02:45  94 22 111/58 (75) 91   


 


8/29/20 02:30  68 20 107/54 (71) 94   


 


8/29/20 02:15  69 20 104/55 (71) 94   


 


8/29/20 02:00  72 21 104/51 (68) 95   


 


8/29/20 02:00    104/55    


 


8/29/20 01:45  76 26 110/50 (70) 95   


 


8/29/20 01:15  73 22 119/40 (66) 93   


 


8/29/20 01:08  74 21     100


 


8/29/20 01:00  74 21 118/47 (70) 93   


 


8/29/20 01:00    119/40    


 


8/29/20 00:30  74 23 127/38 (67) 95   


 


8/29/20 00:00 98.4 65 20 122/39 (66) 97   


 


8/29/20 00:00        100


 


8/29/20 00:00    127/36    


 


8/29/20 00:00      Endotracheal Tube  


 


8/29/20 00:00  77      


 


8/28/20 23:30  71 20 121/41 (67) 98   


 


8/28/20 23:16  71 22     100


 


8/28/20 23:00    122/45    


 


8/28/20 23:00  71 20 122/45 (70) 99   


 


8/28/20 22:43    119/45    


 


8/28/20 22:30  71 21 119/45 (69) 99   


 


8/28/20 22:00  72 20 115/44 (67) 99   


 


8/28/20 22:00    115/44    


 


8/28/20 21:49  73 20 113/47 (69) 97   


 


8/28/20 21:48  65 20 93/60 (71) 98   


 


8/28/20 21:32  72 20 88/51 (63) 95   


 


8/28/20 21:15  74 23 106/64 (78) 99   


 


8/28/20 21:00    97/82    


 


8/28/20 21:00  75 21 97/82 (87) 100   


 


8/28/20 20:45  70 23 122/45 (70) 99   


 


8/28/20 20:43  70 22     100


 


8/28/20 20:30  70 21 131/87 (102) 99   


 


8/28/20 20:15  70 23 112/46 (68) 99   


 


8/28/20 20:00 98.8 67 22 112/55 (74) 99   


 


8/28/20 20:00  70      


 


8/28/20 20:00    112/55    


 


8/28/20 20:00      Endotracheal Tube  


 


8/28/20 20:00        100


 


8/28/20 19:52  66 20 104/49 (67) 100   


 


8/28/20 19:45  71 23 76/52 (60) 99   


 


8/28/20 19:30  67 21 87/57 (67) 99   


 


8/28/20 19:22  70 22 97/45 (62) 99   


 


8/28/20 19:19  68 22 82/49 (60) 99   


 


8/28/20 19:15  73 25 82/58 (66) 99   


 


8/28/20 19:02  72 22     100


 


8/28/20 19:00    110/68    


 


8/28/20 19:00  72 23 110/68 (82) 99   


 


8/28/20 18:30  88 27 137/109 (118) 94   


 


8/28/20 18:19    79/42    


 


8/28/20 18:15  69 17 77/39 (52) 100   


 


8/28/20 18:00  75 22 79/41 (54) 99   


 


8/28/20 18:00    122/58    


 


8/28/20 17:45  69 22 85/40 (55) 99   


 


8/28/20 17:30  65 22 122/58 (79) 100   


 


8/28/20 17:15  63 19 124/49 (74) 100   


 


8/28/20 17:00  68 26 115/48 (70) 100   


 


8/28/20 17:00    111/52    


 


8/28/20 16:45  71 27 118/56 (76) 100   


 


8/28/20 16:30  68 30 112/56 (74) 100   


 


8/28/20 16:15  67 31 124/57 (79) 100   


 


8/28/20 16:00 99.8 67 31 124/57 (79) 100   


 


8/28/20 16:00  66      


 


8/28/20 16:00        100


 


8/28/20 16:00    124/57    


 


8/28/20 16:00      Endotracheal Tube  


 


8/28/20 15:45  87 26 122/106 (111) 99   


 


8/28/20 15:30  61 19 123/92 (102) 100   


 


8/28/20 15:15  61 23 120/101 (107) 100   


 


8/28/20 15:12  60 23     100


 


8/28/20 15:02  73 19 59/41 (47) 99   


 


8/28/20 15:00    72/43    


 


8/28/20 15:00  62 18 72/43 (53) 99   


 


8/28/20 14:45  68 20 108/69 (82) 99   


 


8/28/20 14:30  73 20 70/43 (52) 99   


 


8/28/20 14:15  77 18 124/43 (70) 99   


 


8/28/20 14:00  79 27 114/44 (67) 100   


 


8/28/20 14:00    114/44    


 


8/28/20 13:45  92 27 123/79 (94) 96   


 


8/28/20 13:30  79 23 119/45 (69) 99   


 


8/28/20 13:15  75 19 128/65 (86) 100   


 


8/28/20 13:00  71 19 128/65 (86) 99   


 


8/28/20 13:00    128/65    


 


8/28/20 12:45  52 19 115/47 (69) 100   


 


8/28/20 12:30  53 17 121/48 (72) 100   


 


8/28/20 12:15 100.0 53 18 116/59 (78) 100   


 


8/28/20 12:00  52      


 


8/28/20 12:00  56 20 115/51 (72) 100   


 


8/28/20 12:00        100


 


8/28/20 12:00    115/51    


 


8/28/20 12:00      Endotracheal Tube  


 


8/28/20 11:46    56/22    


 


8/28/20 11:45  61 21 113/42 (65) 100   


 


8/28/20 11:30  88 18 56/22 (33) 99   


 


8/28/20 11:29  84 20     100

















Intake and Output  


 


 8/28/20 8/29/20





 19:00 07:00


 


Intake Total 3315.996 ml 3020.35 ml


 


Output Total 1875 ml 1425 ml


 


Balance 1440.996 ml 1595.35 ml


 


  


 


Free Water 290 ml 380 ml


 


IV Total 2815.996 ml 2540.35 ml


 


Tube Feeding 210 ml 100 ml


 


Output Urine Total 1875 ml 1425 ml


 


# Bowel Movements 5 2











Current Medications








 Medications


  (Trade)  Dose


 Ordered  Sig/Didi


 Route


 PRN Reason  Start Time


 Stop Time Status Last Admin


Dose Admin


 


 Acetaminophen


  (Tylenol)  650 mg  Q4H  PRN


 GT


 FEVER  8/26/20 11:45


 9/25/20 11:44  8/28/20 10:31


 


 


 Chlorhexidine


 Gluconate


  (Beatrice-Hex 2%)  1 applic  DAILY@2000


 TOPIC


   8/26/20 20:00


 11/24/20 19:59  8/28/20 20:53


 


 


 Daptomycin 350 mg/


 Sodium Chloride  55 ml @ 


 100 mls/hr  Q48H


 IV


   8/28/20 11:00


 9/1/20 10:59  8/28/20 10:31


 


 


 Dextrose


  (Dextrose 50%)  25 ml  Q30M  PRN


 IV


 Hypoglycemia  8/26/20 11:30


 11/20/20 11:29   


 


 


 Dextrose


  (Dextrose 50%)  50 ml  Q30M  PRN


 IV


 Hypoglycemia  8/26/20 11:30


 11/20/20 11:29   


 


 


 Heparin Sodium


  (Porcine)


  (Heparin 5000


 units/ml)  5,000 units  EVERY 12  HOURS


 SUBQ


   8/26/20 21:00


 10/6/20 20:59  8/28/20 20:54


 


 


 Levothyroxine


 Sodium


  (Synthroid)  75 mcg  DAILY


 GT


   8/27/20 09:00


 9/22/20 08:59  8/29/20 08:50


 


 


 Meropenem 1 gm/


 Sodium Chloride  55 ml @ 


 110 mls/hr  Q12H


 IVPB


   8/26/20 16:00


 8/31/20 15:59  8/29/20 03:27


 


 


 Metoclopramide HCl


  (Reglan)  5 mg  Q6H


 IVP


   8/28/20 20:30


 9/27/20 20:29  8/29/20 08:50


 


 


 Micafungin Sodium


 100 mg/Sodium


 Chloride  110 ml @ 


 110 mls/hr  Q24H


 IVPB


   8/27/20 14:00


 9/3/20 13:59  8/28/20 13:36


 


 


 Midodrine


  (Pro-Amatine)  10 mg  Q8HR


 GT


   8/28/20 14:00


 11/26/20 13:59  8/29/20 05:16


 


 


 Norepinephrine


 Bitartrate 8 mg/


 Dextrose  558 ml @ 0


 mls/hr  Q24H


 IV


   8/27/20 09:00


 9/26/20 08:59  8/29/20 09:00


 


 


 Ondansetron HCl


  (Zofran)  4 mg  Q6H  PRN


 IVP


 Nausea & Vomiting  8/26/20 12:00


 9/21/20 11:59   


 


 


 Pantoprazole


  (Protonix)  40 mg  DAILY


 IV


   8/27/20 09:00


 9/22/20 08:59  8/29/20 08:50


 


 


 Polyethylene


 Glycol


  (Miralax)  17 gm  DAILYPRN  PRN


 GT


 Constipation  8/26/20 12:00


 9/21/20 11:59   


 


 


 Potassium


 Chloride 40 meq/


 Sodium Chloride  570 ml @ 


 142.5 mls/


 hr  ONCE


 IVPB


   8/29/20 10:00


 8/29/20 13:59  8/29/20 10:06


 


 


 Sodium Chloride  1,000 ml @ 


 100 mls/hr  Q10H


 IV


   8/26/20 23:00


 9/25/20 22:59  8/29/20 10:06


 


 


 Temazepam


  (Restoril)  15 mg  HSPRN  PRN


 GT


 Insomnia  8/26/20 21:00


 8/29/20 20:59   


 


 


 Valproic Acid


  (Depakene)  500 mg  EVERY 8  HOURS


 GT


   8/26/20 14:00


 9/21/20 21:59  8/29/20 05:16


 





Laboratory Tests


8/29/20 04:35: 


White Blood Count 19.4H, Red Blood Count 3.13L, Hemoglobin 10.7L, Hematocrit 

32.4L, Mean Corpuscular Volume 104H, Mean Corpuscular Hemoglobin 34.3H, Mean 

Corpuscular Hemoglobin Concent 33.1, Red Cell Distribution Width 13.2, Platelet 

Count 174, Mean Platelet Volume 6.5, Neutrophils (%) (Auto) , Lymphocytes (%) (

Auto) , Monocytes (%) (Auto) , Eosinophils (%) (Auto) , Basophils (%) (Auto) , 

Differential Total Cells Counted 100, Neutrophils % (Manual) 91H, Lymphocytes % 

(Manual) 6L, Monocytes % (Manual) 3, Eosinophils % (Manual) 0, Basophils % (

Manual) 0, Band Neutrophils 0, Platelet Estimate Adequate, Platelet Morphology 

Normal, Hypochromasia 2+, Anisocytosis 1+, Macrocytosis 1+, Sodium Level 136, 

Potassium Level 3.2L, Chloride Level 107, Carbon Dioxide Level 20L, Anion Gap 9

, Blood Urea Nitrogen 16, Creatinine 1.9H, Estimat Glomerular Filtration Rate 

27.2, Glucose Level 106, Uric Acid 4.0, Calcium Level 7.1L, Phosphorus Level 3.3

, Magnesium Level 1.8, Total Bilirubin 0.2, Aspartate Amino Transf (AST/SGOT) 32

, Alanine Aminotransferase (ALT/SGPT) 16, Alkaline Phosphatase 60, C-Reactive 

Protein, Quantitative 21.8H, Pro-B-Type Natriuretic Peptide 64254C, Total 

Protein 4.7L, Albumin 1.5L, Globulin 3.2, Albumin/Globulin Ratio 0.5L, Anti-

Nuclear Antibody Screen [Pending], c-ANCA Titer [Pending], p-ANCA Titer [Pending

], Blastomyces Ab Immunodiffusion [Pending], Coccidioides Antibody (Comp Fix) [

Pending], Histoplasma Mycelial Antibody [Pending], Histoplasma Antibody w 

Mycelial Ag [Pending], Histoplasma Antibody with Yeast Ag [Pending]


8/29/20 05:00: Histoplasma Antigen [Pending]


Height (Feet):  5


Height (Inches):  3.00


Weight (Pounds):  155


General Appearance:  no apparent distress


Cardiovascular:  normal rate - Rate 80s


Respiratory/Chest:  decreased breath sounds


Abdomen:  soft











Lam Nino MD Aug 29, 2020 11:25

## 2020-08-29 NOTE — NUR
NURSE NOTES:

Received report from LAYTON Youngblood. 

Pt opens eyes spontaneously; does not follow commands; withdraws to pain.

Pt is Afebrile, and ST on cardiac monitor 

Intubated on 8/27 with 7.5 ETT noted 22 cm at the lip line. Settings: AC/VC30  FiO2 
100% Peep 5

G-Tube noted, CDI. Jevity 1.2 at 20 cc/hr HOLD for unstable condition per AMRN. 

Valle noted draining pale, yellow urine. 

Skin alterations noted on pt's toe bilaterally.

Pt has a left femoral TLC, CDI 

Running NS at 100 cc/hr and Levophed at 30 mcg/min. 

Safe measures observed. Side rails up x 2 and padded per seizure precaution.

No acute distress noted. Will continue to monitor.


-------------------------------------------------------------------------------

Addendum: 08/30/20 at 0549 by Elena Browne RN

-------------------------------------------------------------------------------

This is 1930 note

## 2020-08-29 NOTE — NUR
NURSE NOTES:

Pt repositioned with right lung remaining elevated. New levophed bag hung - rate remains 28 
mcg/min BP 96/56. Will continue to monitor.

## 2020-08-29 NOTE — NUR
NURSE HAND-OFF REPORT: 



Latest Vital Signs: Temperature 98.9 , Pulse 77 , B/P 111 /68 , Respiratory Rate 30 , O2 SAT 
94 , Mechanical Ventilator, O2 Flow Rate 15.0 .  

Vital Sign Comment: now stable on max levo - endorsed to titrate down Levo. ABG currently 
being drawn



EKG Rhythm: Sinus Rhythm

Rhythm change?: N 

MD Notified?: n/a

MD Response: n/a



Latest Momin Fall Score: 50  

Fall Risk: High Risk 

Safety Measures: Call light Within Reach, Bed Alarm Zone 2, Side Rails Side Rails x3, Bed 
position Low and Locked.

SZ Precaution implemented. 

Fall Precautions: 

Yellow Socks

Door Sign

Patient Fall Education



Report given to Elena Browne RN.

## 2020-08-29 NOTE — NUR
NURSE NOTES:

SpO2 now noted consistently above 92% with new vent settings - respirations also appear less 
labored- RR 20-22.

## 2020-08-29 NOTE — NUR
NURSE NOTES:

Pt repositioned, oral care provided, Joyce Welch assessing pt at bedside. No distress noted. 
Pt endotracheally suctioned and positioned with right lung elevated. FiO2 down to 80%. SpO2 
noted 98%. Will continue to monitor. 

-------------------------------------------------------------------------------

Addendum: 08/29/20 at 0836 by Rylie Smith RN

-------------------------------------------------------------------------------

Late entry: Joyce also made aware of brief episodes of bradycardia while pt is asleep per 
night shift RN and that OBS results are positive.

## 2020-08-29 NOTE — NUR
NURSE NOTES:

Order received from Joyce Welch for potassium replacement (40 mEq KDUR GT once), bilat lower 
extremity Venous duplex, and hold heparin for today for + OBS - OK to place pt on SCDs if 
venous duplex results return negative.

## 2020-08-29 NOTE — NUR
NURSE NOTES:

Pt noted desaturating in 70-80s- received new vent setting orders from Joyce Welch to place 
pt on VC plus mode with inverse I:E ratio - pt placed on new vent setting per RT. Vilma 
Will continue to monitor. 

-------------------------------------------------------------------------------

Addendum: 08/29/20 at 1342 by Rylie Smith RN

-------------------------------------------------------------------------------

Late entry: respirations appear labored - RR noted 30-40s prior to vent settings change

## 2020-08-29 NOTE — NUR
NURSE NOTES:

Spoke to Joyce Welch, let her know pt still desaturating with AC 20. Order received to 
increase rate to 30 and give 1 amp of HCO3- and repeat ABG in one hour. RT notified. 

-------------------------------------------------------------------------------

Addendum: 08/29/20 at 1854 by Rylie Smith RN

-------------------------------------------------------------------------------

Amendment: 1 amp of NaHCO3

## 2020-08-29 NOTE — NUR
NURSE NOTES:

Pt repositioned with right chest up; oral care provided; afebrile at this time; 1 BM noted- 
pt cleaned. 

BP noted 83/54 - Levo titrated up to 30 mcg/min. Left message for Joyce Welch with BP trends 
and requested stand-by order for second vasopressor. Awaiting call back.

## 2020-08-29 NOTE — NUR
NURSE NOTES:

ABG results for today relayed to Joyce Welch, no new vent setting orders received- however, 
she states she will place orders for breathing Tx and CPT.

## 2020-08-29 NOTE — DIAGNOSTIC IMAGING REPORT
EXAM:

  US Duplex Bilateral Lower Extremities Veins

 

CLINICAL HISTORY:

  DVT

 

TECHNIQUE:

  Real-time duplex ultrasound scan of the bilateral lower extremity veins 

integrating B-mode two-dimensional vascular structure, Doppler spectral 

analysis, color flow Doppler imaging and compression.

 

COMPARISON:

  None

 

FINDINGS:

  Right deep veins:  Unremarkable.  No DVT in the right common femoral, 

femoral, proximal deep femoral or popliteal veins.  The veins demonstrate 

normal color flow, are normally compressible, with normal phasic flow 

and/or augmentation response.

  Right superficial veins:  Unremarkable.  No thrombus in the visualized 

right great saphenous vein.

 

  Left deep veins:  Unremarkable.  No DVT in the left common femoral, 

femoral, proximal deep femoral or popliteal veins.  The veins demonstrate 

normal color flow, are normally compressible, with normal phasic flow 

and/or augmentation response.

  Left superficial veins:  Unremarkable.  No thrombus in the visualized 

left great saphenous vein.

 

  Soft tissues:  Soft tissue edema.  No popliteal cyst.

 

IMPRESSION:     

  No deep venous thrombosis identified in either lower extremity.

## 2020-08-29 NOTE — NUR
NURSE NOTES:

Bed bath given tolerated well. no s/s of acute distress noted. Started Jevity 1.2 at 10cc/hr 
no residual. continue on Levophed drip at 28mcg/min /40 HR 70. no s/s of 
hypo/hyperglycemia. skin warm and dry to touch.  will continue plan of care.

## 2020-08-29 NOTE — DIAGNOSTIC IMAGING REPORT
EXAM:

  US Retroperitoneal Complete, Renal

 

CLINICAL HISTORY:

  RENAL-A

 

TECHNIQUE:

  Real-time complete ultrasound of the retroperitoneum with image 

documentation.

 

COMPARISON:

  None

 

FINDINGS:

  Inferior vena cava:  Visualized portions of the IVC are grossly 

unremarkable.

  Right kidney:  Right kidney measures 10.6 cm in length.  No 

hydronephrosis or stone.

  Left kidney:  Left kidney measures 10.5 cm in length.  No 

hydronephrosis or stone.

  Bladder:  Valle catheter in a decompressed bladder.

 

IMPRESSION:     

  No hydronephrosis or stone.

## 2020-08-29 NOTE — INTERNAL MED PROGRESS NOTE
Subjective


Date of Service:  Aug 29, 2020


Physician Name


Sanya Schultz


Attending Physician


Chano Cherry MD





Current Medications








 Medications


  (Trade)  Dose


 Ordered  Sig/Didi


 Route


 PRN Reason  Start Time


 Stop Time Status Last Admin


Dose Admin


 


 Acetaminophen


  (Tylenol)  650 mg  Q4H  PRN


 GT


 FEVER  8/26/20 11:45


 9/25/20 11:44  8/28/20 10:31


 


 


 Albuterol/


 Ipratropium


  (Albuterol/


 Ipratropium)  3 ml  Q4H  PRN


 HHN


 Shortness of Breath  8/29/20 11:30


 9/3/20 11:29   


 


 


 Albuterol/


 Ipratropium


  (Albuterol/


 Ipratropium)  3 ml  TIDRT


 HHN


   8/29/20 13:00


 9/3/20 12:59  8/29/20 12:34


 


 


 Chlorhexidine


 Gluconate


  (Beatrice-Hex 2%)  1 applic  DAILY@2000


 TOPIC


   8/26/20 20:00


 11/24/20 19:59  8/28/20 20:53


 


 


 Daptomycin 350 mg/


 Sodium Chloride  55 ml @ 


 100 mls/hr  Q48H


 IV


   8/28/20 11:00


 9/1/20 10:59  8/28/20 10:31


 


 


 Dextrose


  (Dextrose 50%)  25 ml  Q30M  PRN


 IV


 Hypoglycemia  8/26/20 11:30


 11/20/20 11:29   


 


 


 Dextrose


  (Dextrose 50%)  50 ml  Q30M  PRN


 IV


 Hypoglycemia  8/26/20 11:30


 11/20/20 11:29   


 


 


 Levothyroxine


 Sodium


  (Synthroid)  75 mcg  DAILY


 GT


   8/27/20 09:00


 9/22/20 08:59  8/29/20 08:50


 


 


 Linezolid  300 ml @ 


 300 mls/hr  Q12H


 IVPB


   8/29/20 15:00


 9/5/20 14:59  8/29/20 13:59


 


 


 Meropenem 1 gm/


 Sodium Chloride  55 ml @ 


 110 mls/hr  Q12H


 IVPB


   8/26/20 16:00


 8/31/20 15:59  8/29/20 03:27


 


 


 Micafungin Sodium


 100 mg/Sodium


 Chloride  110 ml @ 


 110 mls/hr  Q24H


 IVPB


   8/27/20 14:00


 9/3/20 13:59  8/29/20 13:15


 


 


 Midodrine


  (Pro-Amatine)  10 mg  Q8HR


 GT


   8/28/20 14:00


 11/26/20 13:59  8/29/20 13:22


 


 


 Norepinephrine


 Bitartrate 8 mg/


 Dextrose  558 ml @ 0


 mls/hr  Q24H


 IV


   8/27/20 09:00


 9/26/20 08:59  8/29/20 13:59


 


 


 Ondansetron HCl


  (Zofran)  4 mg  Q6H  PRN


 IVP


 Nausea & Vomiting  8/26/20 12:00


 9/21/20 11:59   


 


 


 Pantoprazole


  (Protonix)  40 mg  DAILY


 IV


   8/27/20 09:00


 9/22/20 08:59  8/29/20 08:50


 


 


 Polyethylene


 Glycol


  (Miralax)  17 gm  DAILYPRN  PRN


 GT


 Constipation  8/26/20 12:00


 9/21/20 11:59   


 


 


 Sodium Chloride  1,000 ml @ 


 100 mls/hr  Q10H


 IV


   8/26/20 23:00


 9/25/20 22:59  8/29/20 10:06


 


 


 Temazepam


  (Restoril)  15 mg  HSPRN  PRN


 GT


 Insomnia  8/26/20 21:00


 8/29/20 20:59   


 


 


 Valproic Acid


  (Depakene)  500 mg  EVERY 8  HOURS


 GT


   8/26/20 14:00


 9/21/20 21:59  8/29/20 13:15


 








Allergies:  


Coded Allergies:  


     No Known Allergies (Unverified , 1/8/19)


ROS Limited/Unobtainable:  Yes


Subjective


58 YO F with Down's syndrome admitted with hypoxia.  Now sepsis and pneumonia.  

Cover for Int Med-DR Hung.  ICU.  Intubated and sedated





Objective





Last Vital Signs








  Date Time  Temp Pulse Resp B/P (MAP) Pulse Ox O2 Delivery O2 Flow Rate FiO2


 


8/29/20 14:00    96/56    


 


8/29/20 14:00  64 20  94   


 


8/29/20 12:00 98.5       


 


8/29/20 12:00        100


 


8/29/20 12:00      Mechanical Ventilator  





      Mechanical Ventilator  


 


8/27/20 00:00       15.0 











Laboratory Tests








Test


  8/29/20


04:35 8/29/20


05:00 8/29/20


10:43


 


White Blood Count


  19.4 K/UL


(4.8-10.8)  H 


  


 


 


Red Blood Count


  3.13 M/UL


(4.20-5.40)  L 


  


 


 


Hemoglobin


  10.7 G/DL


(12.0-16.0)  L 


  


 


 


Hematocrit


  32.4 %


(37.0-47.0)  L 


  


 


 


Mean Corpuscular Volume


  104 FL (80-99)


H 


  


 


 


Mean Corpuscular Hemoglobin


  34.3 PG


(27.0-31.0)  H 


  


 


 


Mean Corpuscular Hemoglobin


Concent 33.1 G/DL


(32.0-36.0) 


  


 


 


Red Cell Distribution Width


  13.2 %


(11.6-14.8) 


  


 


 


Platelet Count


  174 K/UL


(150-450) 


  


 


 


Mean Platelet Volume


  6.5 FL


(6.5-10.1) 


  


 


 


Neutrophils (%) (Auto)


  % (45.0-75.0)


  


  


 


 


Lymphocytes (%) (Auto)


  % (20.0-45.0)


  


  


 


 


Monocytes (%) (Auto)  % (1.0-10.0)    


 


Eosinophils (%) (Auto)  % (0.0-3.0)    


 


Basophils (%) (Auto)  % (0.0-2.0)    


 


Differential Total Cells


Counted 100  


  


  


 


 


Neutrophils % (Manual) 91 % (45-75)  H  


 


Lymphocytes % (Manual) 6 % (20-45)  L  


 


Monocytes % (Manual) 3 % (1-10)    


 


Eosinophils % (Manual) 0 % (0-3)    


 


Basophils % (Manual) 0 % (0-2)    


 


Band Neutrophils 0 % (0-8)    


 


Platelet Estimate Adequate    


 


Platelet Morphology Normal    


 


Hypochromasia 2+    


 


Anisocytosis 1+    


 


Macrocytosis 1+    


 


Sodium Level


  136 MMOL/L


(136-145) 


  


 


 


Potassium Level


  3.2 MMOL/L


(3.5-5.1)  L 


  


 


 


Chloride Level


  107 MMOL/L


() 


  


 


 


Carbon Dioxide Level


  20 MMOL/L


(21-32)  L 


  


 


 


Anion Gap


  9 mmol/L


(5-15) 


  


 


 


Blood Urea Nitrogen


  16 mg/dL


(7-18) 


  


 


 


Creatinine


  1.9 MG/DL


(0.55-1.30)  H 


  


 


 


Estimat Glomerular Filtration


Rate 27.2 mL/min


(>60) 


  


 


 


Glucose Level


  106 MG/DL


() 


  


 


 


Uric Acid


  4.0 MG/DL


(2.6-7.2) 


  


 


 


Calcium Level


  7.1 MG/DL


(8.5-10.1)  L 


  


 


 


Phosphorus Level


  3.3 MG/DL


(2.5-4.9) 


  


 


 


Magnesium Level


  1.8 MG/DL


(1.8-2.4) 


  


 


 


Total Bilirubin


  0.2 MG/DL


(0.2-1.0) 


  


 


 


Aspartate Amino Transf


(AST/SGOT) 32 U/L (15-37)


  


  


 


 


Alanine Aminotransferase


(ALT/SGPT) 16 U/L (12-78)


  


  


 


 


Alkaline Phosphatase


  60 U/L


() 


  


 


 


C-Reactive Protein,


Quantitative 21.8 mg/dL


(0.00-0.90)  H 


  


 


 


Pro-B-Type Natriuretic Peptide


  99447 pg/mL


(0-125)  H 


  


 


 


Total Protein


  4.7 G/DL


(6.4-8.2)  L 


  


 


 


Albumin


  1.5 G/DL


(3.4-5.0)  L 


  


 


 


Globulin 3.2 g/dL    


 


Albumin/Globulin Ratio


  0.5 (1.0-2.7)


L 


  


 


 


Anti-Nuclear Antibody Screen Pending    


 


c-ANCA Titer Pending    


 


p-ANCA Titer Pending    


 


Blastomyces Ab Immunodiffusion Pending    


 


Coccidioides Antibody (Comp


Fix) Pending  


  


  


 


 


Histoplasma Mycelial Antibody Pending    


 


Histoplasma Antibody w


Mycelial Ag Pending  


  


  


 


 


Histoplasma Antibody with


Yeast Ag Pending  


  


  


 


 


Histoplasma Antigen  Pending   


 


Arterial Blood pH


  


  


  7.272


(7.350-7.450)


 


Arterial Blood Partial


Pressure CO2 


  


  36.7 mmHg


(35.0-45.0)


 


Arterial Blood Partial


Pressure O2 


  


  50.6 mmHg


(75.0-100.0)  L


 


Arterial Blood HCO3


  


  


  16.6 mmol/L


(22.0-26.0)  *L


 


Arterial Blood Oxygen


Saturation 


  


  84.2 %


()  *L


 


Arterial Blood Base Excess   -9.5 (-2-2)  *L


 


Cole Test   Positive  











Microbiology








 Date/Time


Source Procedure


Growth Status


 


 


 8/27/20 16:15


Blood Blood Culture - Preliminary Resulted


 


 8/27/20 16:05


Blood Blood Culture - Preliminary


NO GROWTH AFTER 24 HOURS Resulted

















Intake and Output  


 


 8/28/20 8/29/20





 19:00 07:00


 


Intake Total 3315.996 ml 3020.35 ml


 


Output Total 1875 ml 1425 ml


 


Balance 1440.996 ml 1595.35 ml


 


  


 


Free Water 290 ml 380 ml


 


IV Total 2815.996 ml 2540.35 ml


 


Tube Feeding 210 ml 100 ml


 


Output Urine Total 1875 ml 1425 ml


 


# Bowel Movements 5 2








Objective


General Appearance:  WD/WN, no apparent distress, alert


EENT:  PERRL/EOMI, normal ENT inspection


Neck:  non-tender, normal alignment, supple, normal inspection


Cardiovascular:  normal peripheral pulses, normal rate, regular rhythm, no 

gallop/murmur, no JVD


Respiratory/Chest:  Mec vent; decreased breath sounds, crackles/rales, rhonchi 

- bilaterally, expiratory wheezing


Abdomen:  normal bowel sounds, non tender, soft, no organomegaly, no mass


Extremities:  normal range of motion


Neurologic:  CNs II-XII grossly normal


Skin:  normal pigmentation, warm/dry





Assessment/Plan


Problem List:  


(1) HCAP (healthcare-associated pneumonia)


Assessment & Plan:  Strep Group G.  Continue daptomycin per ID=Dr Gomes.  

Pulmonary/Critical care=DR Cherry.  COVID NEG





(2) Sepsis


Assessment & Plan:  Staph haemolyticus.  Continue daptomycin and ceftriaxone 

per ID=Dr Gomes





(3) Down's syndrome


(4) Dysphagia


Assessment & Plan:  S/P PEG





(5) Seizure disorder


Assessment & Plan:  Continue keppra and depakote





(6) Hypothyroidism


Assessment & Plan:  Continue synthroid





(7) Acute respiratory failure


Assessment & Plan:  Pulmonary = Dr Cherry; [facility] vent














Sanya Schultz MD Aug 29, 2020 15:09

## 2020-08-29 NOTE — NUR
NURSE NOTES:

bedside renal US currently being performed. Pt repositioned prior and oral care provided. 
bLOOD CULTURE AND gt SITE CULTURE SENT DOWN TO LAB. No distress noted at this time. WILL 
continue to monitor.

## 2020-08-29 NOTE — INFECTIOUS DISEASES PROG NOTE
Assessment/Plan








ASSESSMENT:


sp code blue 8/26





Septic Shock


Fever


Leukocytosis; improving


  -8/26 u/a no pyuria





Pneumonia.- COVID 19 neg x3


   Acute hypoxic resp failure on VM> NRB 15l 100%; hypoxic on ABG> now VDRF 8/26

- Fio2 80% >100%58/28 8/28 CXR: Increasing left upper lobe dense consolidation and likely 

increasing bilateral pleural fluid. Persistent diffuse dense consolidation 

elsewhere


            -8/26 sp cx normal resp lilliam


             -8/25 CXR: Increased atelectasis of the right lung, since prior 

exam of 3 days earlier. New or increased right pleural effusion. Increased left 

basilar consolidation and/or pleural fluid 


          -COVID Rapid PCR neg 8/23, 8/23, 8/26


          -8/22 spc x Group G strep


          -8/22 CXR:   Reduced lung volumes.  Patchy bilateral predominantly 

interstitial  pulmonary opacities.  Could be from edema and/or pneumonia.  

There is a broader differential.





Persistent, high grade bacteremia-  r/o endocarditis


     -8/22 Bcx 4/4 sets S. haemolyticus; 8/23 Bcx 3/4 S/ epi; 8/26 Bcx NTD


     -2d echo: no vegetaions seen





   R/o probable UTI


          ua/ wbc 10-15, nit neg, leuk +1; ucx Neg





DAVID; worsening


 -supratherapeutic vanco levels





-Seizure disorder.


- Hypothyroidism.


- Down syndrome.





History of PEG tube placement.


[facility] resident





PLAN:


Start linezolid given persistent GPC bacteremia (double coverage, though not 

ideal for bacteremia) and pneumonia (dapto doesn't cover for pneumonia)


Cont Daptomycin #5 (abx d #8) for GPC bacteremia in view of DAVID [monitor CK, 

was 51 on 8/26]


Cont Meropenem#4 (abx d #8) given resp decompensation


Cont Micafungin #3





Repeat BCx given persistent bacteremia


CT chest when stable enough, not currently given on FiO2 100% and pressors


If pleural effusions, should be tapped to r/o empyema, send for bacterial cx as 

well as cell count and diff


F/u cx of exudate around G-tube


Trend GIB (black stools)





   8/28 SP Azithromycin #7/7


   8/26 SP Ceftriaxone #2


   8/25 SP IV Vancomycin #4, Zosyn #4


   8/22 SP Cefepime x1, Flagyl x1





2. Monitor CBC.


3. Monitor BMP.


4. Cocci ab, CrAg


5.f/u  Repeat cx


6. COVID neg x3; will continue isolation for now given ongoing hypoxia and fever


7. Monitor chest x-ray.


8. Monitor the patient's clinical course and labs.  Based on those, we


will do further recommendation.





Thank you, Dr. hCerry, for allowing me to participate in the care of


this patient.  I will follow the patient with you at this


hospitalization.








Discussed with RN and Pharmacy





Subjective


Allergies:  


Coded Allergies:  


     No Known Allergies (Unverified , 1/8/19)





AF x24hrs, improved


WBC 19, overall stable from prior though not improving


NAD on vent, but remains on FiO2 100% with poor saturation


BCx w/ GPCs


Swabbed exudate from G-tube given





Objective





Last 24 Hour Vital Signs








  Date Time  Temp Pulse Resp B/P (MAP) Pulse Ox O2 Delivery O2 Flow Rate FiO2


 


8/29/20 08:00 98.7 78 21 120/93 (102) 94   


 


8/29/20 08:00        70


 


8/29/20 07:48  78 22     70


 


8/29/20 07:45  78 22 90/62 (71) 100   


 


8/29/20 07:30  74 21 115/90 (98) 97   


 


8/29/20 07:15  59 20 117/55 (75) 97   


 


8/29/20 07:00    117/55    


 


8/29/20 07:00  61 20 110/54 (72) 96   


 


8/29/20 06:30  63 20 110/46 (67) 96   


 


8/29/20 06:00    106/54    


 


8/29/20 06:00  62 20 108/52 (70) 96   


 


8/29/20 05:30  64 20 105/50 (68) 95   


 


8/29/20 05:00  65 20 105/48 (67) 94   


 


8/29/20 05:00    109/42    


 


8/29/20 04:36  66 20     100


 


8/29/20 04:30  66 20 106/50 (68) 93   


 


8/29/20 04:00 99.5 68 20 104/48 (66) 92   


 


8/29/20 04:00      Endotracheal Tube  


 


8/29/20 04:00    111/58    


 


8/29/20 04:00        100


 


8/29/20 03:58  70      


 


8/29/20 03:45  69 20 105/48 (67) 92   


 


8/29/20 03:30  70 20 104/49 (67) 92   


 


8/29/20 03:27    116/49    


 


8/29/20 03:15  71 21 116/49 (71) 92   


 


8/29/20 03:03  67 22     100


 


8/29/20 03:00  73 23 113/52 (72) 94   


 


8/29/20 02:45  94 22 111/58 (75) 91   


 


8/29/20 02:30  68 20 107/54 (71) 94   


 


8/29/20 02:15  69 20 104/55 (71) 94   


 


8/29/20 02:00  72 21 104/51 (68) 95   


 


8/29/20 02:00    104/55    


 


8/29/20 01:45  76 26 110/50 (70) 95   


 


8/29/20 01:15  73 22 119/40 (66) 93   


 


8/29/20 01:08  74 21     100


 


8/29/20 01:00  74 21 118/47 (70) 93   


 


8/29/20 01:00    119/40    


 


8/29/20 00:30  74 23 127/38 (67) 95   


 


8/29/20 00:00 98.4 65 20 122/39 (66) 97   


 


8/29/20 00:00        100


 


8/29/20 00:00    127/36    


 


8/29/20 00:00      Endotracheal Tube  


 


8/29/20 00:00  77      


 


8/28/20 23:30  71 20 121/41 (67) 98   


 


8/28/20 23:16  71 22     100


 


8/28/20 23:00    122/45    


 


8/28/20 23:00  71 20 122/45 (70) 99   


 


8/28/20 22:43    119/45    


 


8/28/20 22:30  71 21 119/45 (69) 99   


 


8/28/20 22:00  72 20 115/44 (67) 99   


 


8/28/20 22:00    115/44    


 


8/28/20 21:49  73 20 113/47 (69) 97   


 


8/28/20 21:48  65 20 93/60 (71) 98   


 


8/28/20 21:32  72 20 88/51 (63) 95   


 


8/28/20 21:15  74 23 106/64 (78) 99   


 


8/28/20 21:00    97/82    


 


8/28/20 21:00  75 21 97/82 (87) 100   


 


8/28/20 20:45  70 23 122/45 (70) 99   


 


8/28/20 20:43  70 22     100


 


8/28/20 20:30  70 21 131/87 (102) 99   


 


8/28/20 20:15  70 23 112/46 (68) 99   


 


8/28/20 20:00 98.8 67 22 112/55 (74) 99   


 


8/28/20 20:00  70      


 


8/28/20 20:00    112/55    


 


8/28/20 20:00      Endotracheal Tube  


 


8/28/20 20:00        100


 


8/28/20 19:52  66 20 104/49 (67) 100   


 


8/28/20 19:45  71 23 76/52 (60) 99   


 


8/28/20 19:30  67 21 87/57 (67) 99   


 


8/28/20 19:22  70 22 97/45 (62) 99   


 


8/28/20 19:19  68 22 82/49 (60) 99   


 


8/28/20 19:15  73 25 82/58 (66) 99   


 


8/28/20 19:02  72 22     100


 


8/28/20 19:00    110/68    


 


8/28/20 19:00  72 23 110/68 (82) 99   


 


8/28/20 18:30  88 27 137/109 (118) 94   


 


8/28/20 18:19    79/42    


 


8/28/20 18:15  69 17 77/39 (52) 100   


 


8/28/20 18:00  75 22 79/41 (54) 99   


 


8/28/20 18:00    122/58    


 


8/28/20 17:45  69 22 85/40 (55) 99   


 


8/28/20 17:30  65 22 122/58 (79) 100   


 


8/28/20 17:15  63 19 124/49 (74) 100   


 


8/28/20 17:00  68 26 115/48 (70) 100   


 


8/28/20 17:00    111/52    


 


8/28/20 16:45  71 27 118/56 (76) 100   


 


8/28/20 16:30  68 30 112/56 (74) 100   


 


8/28/20 16:15  67 31 124/57 (79) 100   


 


8/28/20 16:00 99.8 67 31 124/57 (79) 100   


 


8/28/20 16:00  66      


 


8/28/20 16:00        100


 


8/28/20 16:00    124/57    


 


8/28/20 16:00      Endotracheal Tube  


 


8/28/20 15:45  87 26 122/106 (111) 99   


 


8/28/20 15:30  61 19 123/92 (102) 100   


 


8/28/20 15:15  61 23 120/101 (107) 100   


 


8/28/20 15:12  60 23     100


 


8/28/20 15:02  73 19 59/41 (47) 99   


 


8/28/20 15:00    72/43    


 


8/28/20 15:00  62 18 72/43 (53) 99   


 


8/28/20 14:45  68 20 108/69 (82) 99   


 


8/28/20 14:30  73 20 70/43 (52) 99   


 


8/28/20 14:15  77 18 124/43 (70) 99   


 


8/28/20 14:00  79 27 114/44 (67) 100   


 


8/28/20 14:00    114/44    


 


8/28/20 13:45  92 27 123/79 (94) 96   


 


8/28/20 13:30  79 23 119/45 (69) 99   


 


8/28/20 13:15  75 19 128/65 (86) 100   


 


8/28/20 13:00  71 19 128/65 (86) 99   


 


8/28/20 13:00    128/65    


 


8/28/20 12:45  52 19 115/47 (69) 100   


 


8/28/20 12:30  53 17 121/48 (72) 100   


 


8/28/20 12:15 100.0 53 18 116/59 (78) 100   


 


8/28/20 12:00  52      


 


8/28/20 12:00  56 20 115/51 (72) 100   


 


8/28/20 12:00        100


 


8/28/20 12:00    115/51    


 


8/28/20 12:00      Endotracheal Tube  


 


8/28/20 11:46    56/22    


 


8/28/20 11:45  61 21 113/42 (65) 100   


 


8/28/20 11:30  88 18 56/22 (33) 99   


 


8/28/20 11:29  84 20     100


 


8/28/20 11:17  83 20 60/30 (40) 100   


 


8/28/20 11:15  79 20 64/27 (39) 99   


 


8/28/20 11:01 100.0       


 


8/28/20 11:00  65 20 101/43 (62) 100   


 


8/28/20 11:00    101/43    


 


8/28/20 10:30  59 21 93/53 (66) 98   


 


8/28/20 10:00    97/47    


 


8/28/20 10:00  68 22 97/47 (64) 98   


 


8/28/20 09:30  73 21 98/51 (67) 98   


 


8/28/20 09:00  73 22 94/53 (67) 97   


 


8/28/20 09:00    98/51    


 


8/28/20 08:30  70 20 92/47 (62) 96   








Height (Feet):  5


Height (Inches):  3.00


Weight (Pounds):  155





Gen: NAD


Pulm: BL chest rise


Abd: Non-distended


Ext: No c/c/e


Skin: No visible skin rashes





Microbiology








 Date/Time


Source Procedure


Growth Status


 


 


 8/27/20 16:15


Blood Blood Culture - Preliminary


NO GROWTH AFTER 24 HOURS Resulted


 


 8/27/20 16:05


Blood Blood Culture - Preliminary


NO GROWTH AFTER 24 HOURS Resulted


 


 8/26/20 13:30


Nasal Nares MRSA Culture - Final


NO METHICILLIN RESISTANT STAPH AUREUS... Complete





 8/26/20 13:30


Sputum Expectorated Gram Stain - Final Complete


 


 8/26/20 13:30


Sputum Expectorated Sputum Culture - Final


NORMAL UPPER RESPIRATORY LILLIAM PRESENT Complete


 


 8/26/20 13:20


Nasopharynx SARS-CoV-2 RdRp Gene Assay - Final Complete











Laboratory Tests








Test


  8/29/20


04:35 8/29/20


05:00


 


White Blood Count


  19.4 K/UL


(4.8-10.8)  H 


 


 


Red Blood Count


  3.13 M/UL


(4.20-5.40)  L 


 


 


Hemoglobin


  10.7 G/DL


(12.0-16.0)  L 


 


 


Hematocrit


  32.4 %


(37.0-47.0)  L 


 


 


Mean Corpuscular Volume


  104 FL (80-99)


H 


 


 


Mean Corpuscular Hemoglobin


  34.3 PG


(27.0-31.0)  H 


 


 


Mean Corpuscular Hemoglobin


Concent 33.1 G/DL


(32.0-36.0) 


 


 


Red Cell Distribution Width


  13.2 %


(11.6-14.8) 


 


 


Platelet Count


  174 K/UL


(150-450) 


 


 


Mean Platelet Volume


  6.5 FL


(6.5-10.1) 


 


 


Neutrophils (%) (Auto)


  % (45.0-75.0)


  


 


 


Lymphocytes (%) (Auto)


  % (20.0-45.0)


  


 


 


Monocytes (%) (Auto)  % (1.0-10.0)   


 


Eosinophils (%) (Auto)  % (0.0-3.0)   


 


Basophils (%) (Auto)  % (0.0-2.0)   


 


Neutrophils % (Manual) Pending   


 


Lymphocytes % (Manual) Pending   


 


Platelet Estimate Pending   


 


Platelet Morphology Pending   


 


Sodium Level


  136 MMOL/L


(136-145) 


 


 


Potassium Level


  3.2 MMOL/L


(3.5-5.1)  L 


 


 


Chloride Level


  107 MMOL/L


() 


 


 


Carbon Dioxide Level


  20 MMOL/L


(21-32)  L 


 


 


Anion Gap


  9 mmol/L


(5-15) 


 


 


Blood Urea Nitrogen


  16 mg/dL


(7-18) 


 


 


Creatinine


  1.9 MG/DL


(0.55-1.30)  H 


 


 


Estimat Glomerular Filtration


Rate 27.2 mL/min


(>60) 


 


 


Glucose Level


  106 MG/DL


() 


 


 


Uric Acid


  4.0 MG/DL


(2.6-7.2) 


 


 


Calcium Level


  7.1 MG/DL


(8.5-10.1)  L 


 


 


Phosphorus Level


  3.3 MG/DL


(2.5-4.9) 


 


 


Magnesium Level


  1.8 MG/DL


(1.8-2.4) 


 


 


Total Bilirubin


  0.2 MG/DL


(0.2-1.0) 


 


 


Aspartate Amino Transf


(AST/SGOT) 32 U/L (15-37)


  


 


 


Alanine Aminotransferase


(ALT/SGPT) 16 U/L (12-78)


  


 


 


Alkaline Phosphatase


  60 U/L


() 


 


 


C-Reactive Protein,


Quantitative 21.8 mg/dL


(0.00-0.90)  H 


 


 


Pro-B-Type Natriuretic Peptide


  46107 pg/mL


(0-125)  H 


 


 


Total Protein


  4.7 G/DL


(6.4-8.2)  L 


 


 


Albumin


  1.5 G/DL


(3.4-5.0)  L 


 


 


Globulin 3.2 g/dL   


 


Albumin/Globulin Ratio


  0.5 (1.0-2.7)


L 


 


 


Anti-Nuclear Antibody Screen Pending   


 


c-ANCA Titer Pending   


 


p-ANCA Titer Pending   


 


Blastomyces Ab Immunodiffusion Pending   


 


Coccidioides Antibody (Comp


Fix) Pending  


  


 


 


Histoplasma Mycelial Antibody Pending   


 


Histoplasma Antibody w


Mycelial Ag Pending  


  


 


 


Histoplasma Antibody with


Yeast Ag Pending  


  


 


 


Histoplasma Antigen  Pending  











Current Medications








 Medications


  (Trade)  Dose


 Ordered  Sig/Didi


 Route


 PRN Reason  Start Time


 Stop Time Status Last Admin


Dose Admin


 


 Acetaminophen


  (Tylenol)  650 mg  Q4H  PRN


 GT


 FEVER  8/26/20 11:45


 9/25/20 11:44  8/28/20 10:31


 


 


 Azithromycin 500


 mg/Dextrose  275 ml @ 


 275 mls/hr  Q24HRS


 IV


   8/26/20 14:00


 8/29/20 14:59  8/28/20 13:36


 


 


 Chlorhexidine


 Gluconate


  (Beatrice-Hex 2%)  1 applic  DAILY@2000


 TOPIC


   8/26/20 20:00


 11/24/20 19:59  8/28/20 20:53


 


 


 Daptomycin 350 mg/


 Sodium Chloride  55 ml @ 


 100 mls/hr  Q48H


 IV


   8/28/20 11:00


 9/1/20 10:59  8/28/20 10:31


 


 


 Dextrose


  (Dextrose 50%)  25 ml  Q30M  PRN


 IV


 Hypoglycemia  8/26/20 11:30


 11/20/20 11:29   


 


 


 Dextrose


  (Dextrose 50%)  50 ml  Q30M  PRN


 IV


 Hypoglycemia  8/26/20 11:30


 11/20/20 11:29   


 


 


 Heparin Sodium


  (Porcine)


  (Heparin 5000


 units/ml)  5,000 units  EVERY 12  HOURS


 SUBQ


   8/26/20 21:00


 10/6/20 20:59  8/28/20 20:54


 


 


 Levothyroxine


 Sodium


  (Synthroid)  75 mcg  DAILY


 GT


   8/27/20 09:00


 9/22/20 08:59  8/28/20 09:11


 


 


 Meropenem 1 gm/


 Sodium Chloride  55 ml @ 


 110 mls/hr  Q12H


 IVPB


   8/26/20 16:00


 8/31/20 15:59  8/29/20 03:27


 


 


 Metoclopramide HCl


  (Reglan)  5 mg  Q6H


 IVP


   8/28/20 20:30


 9/27/20 20:29  8/29/20 01:49


 


 


 Micafungin Sodium


 100 mg/Sodium


 Chloride  110 ml @ 


 110 mls/hr  Q24H


 IVPB


   8/27/20 14:00


 9/3/20 13:59  8/28/20 13:36


 


 


 Midodrine


  (Pro-Amatine)  10 mg  Q8HR


 GT


   8/28/20 14:00


 11/26/20 13:59  8/29/20 05:16


 


 


 Norepinephrine


 Bitartrate 8 mg/


 Dextrose  558 ml @ 0


 mls/hr  Q24H


 IV


   8/27/20 09:00


 9/26/20 08:59  8/29/20 03:27


 


 


 Ondansetron HCl


  (Zofran)  4 mg  Q6H  PRN


 IVP


 Nausea & Vomiting  8/26/20 12:00


 9/21/20 11:59   


 


 


 Pantoprazole


  (Protonix)  40 mg  DAILY


 IV


   8/27/20 09:00


 9/22/20 08:59  8/28/20 09:11


 


 


 Polyethylene


 Glycol


  (Miralax)  17 gm  DAILYPRN  PRN


 GT


 Constipation  8/26/20 12:00


 9/21/20 11:59   


 


 


 Sodium Chloride  1,000 ml @ 


 100 mls/hr  Q10H


 IV


   8/26/20 23:00


 9/25/20 22:59  8/29/20 01:49


 


 


 Temazepam


  (Restoril)  15 mg  HSPRN  PRN


 GT


 Insomnia  8/26/20 21:00


 8/29/20 20:59   


 


 


 Valproic Acid


  (Depakene)  500 mg  EVERY 8  HOURS


 GT


   8/26/20 14:00


 9/21/20 21:59  8/29/20 05:16


 

















Donna Schmitt M.D. Aug 29, 2020 08:26

## 2020-08-29 NOTE — NUR
NURSE NOTES:

Venous duplex results noted negative. SCDs applied to bilat lower extremities. Heparin d/c 
per order from Joyce Welch for OBS positive results.

## 2020-08-29 NOTE — NUR
NURSE NOTES:

Pt received from [facility]. Pt opens eyes spontaneously; does not follow commands; 
withdraws to pain; bilat pupils equal and round 3 mm with sluggish rxn to light. Pt is SR to 
cardiac monitor with 2+ radial pulses and 1+ dorsalis pedis pulses present. Cap refill noted 
4 sec, mottling noted to extremities. Pt is afebrile at this time. Pt is mechanically 
ventilated with 7.5 ETT noted 22 cm at the lip with the following settings: AC 20  
FiO2 100% Peep 5- bilat upper lung sounds noted with crackles and lower lung lobes 
diminished upon auscultation. SpO2 noted % - will begin to titrate down FiO2. Abd is 
round, and slightly distended+firm to palpation with hypoactive bowel sounds to all 
quadrants. GT site noted with small amount of green/yellow exudate currently running Jevity 
1.2 at 20 cc/hr with 5 cc gastric residuals noted at this time. Willl f/u with ID. F/C noted 
draining pale, yellow urine. Skin alterations noted. Pt has a left femoral TLC with dry and 
intact dressing running NS at 100 cc/hr and Levophed at 28 mcg/min. BP noted 115/90 mmHg. 
Will begin to titrate down levo as tolerated. Bed in lowest position, alarm on, side rails 
up x 2 and padded per seizure precaution, call light within reach. Will continue to monitor.

## 2020-08-29 NOTE — NUR
NURSE NOTES:

patient in bed sleeping. no s/s of acute distress noted. on Jevity 1.2 at 10cc/hr via GT  no 
residual. continue on Levophed drip at 28mcg/min /49 HR 70. no s/s of 
hypo/hyperglycemia. skin warm and dry to touch.  will continue plan of care.

## 2020-08-29 NOTE — PULMONOLGY CRITICAL CARE NOTE
Critical Care - Asmt/Plan


Assessment/Plan:


ASSESSMENT


s/p cardiopulmonary arrest


Acute hypoxemic respiratory failure requiring intubation


Sepsis with shock


Persistent CONS bacteremia


Pneumonia FELICE


DAVID


Down syndrome


Hypothyroidism


Seizure disorder


 


PLAN OF CARE


ICU


vent support, pulmonary toilet with CPT and HHN ( COVID x 3 NGT) 


fup  with CXR and ABG


titrate settings  based on results


now down to 85%   FiO2


Venous duplex BLE   NGT, dc Heparin given stool OB + and start SCD


ABG after FiO2 up to100% noted, keep settings as is and titrate Fio2 to keep 

sat above 92% 


CXR and  ABG in am








pressors;  titrate to keep mean arterial BP > 65


also on IVF, midodrine and periodic albumin boluses


Echo with pEF 65%


 


Abx  as per ID recs 


COVID-19 x3 NGT


BCX 8/22 and 8/23 + CONS


BCX  8/26  NGTD,


BCX 8/27 + [facility]


initial SCX + with Strep group G, repeated SCX NGT


UCX NGT


Echo no evidence of vegetation 


abx regimen broadened as per ID recs:    daptomycin, meropenem, azithromycin, 

micafungin


creatinine worsening, likely due to vancomycin


IVF


avoid nephrotoxic  


correct electrolytes as needed


renal ultrasound 


nephro follows





DVT GI prophylaxis


continue  levothyroxine , TSH within normal limits


seizure precautions,  continue Depakote  


supportive care   


podiatrist consulted re necrotic toe 





case discussed and evaluated by supervising physician





Critical Care - Objective





Last 24 Hour Vital Signs








  Date Time  Temp Pulse Resp B/P (MAP) Pulse Ox O2 Delivery O2 Flow Rate FiO2


 


8/29/20 10:00  81 23 100/37 (58) 91   


 


8/29/20 09:16        85


 


8/29/20 09:15  75 21 101/72 (82) 94   


 


8/29/20 09:00  77 22 91/72 (78) 93   


 


8/29/20 09:00    91/56    


 


8/29/20 08:00 98.7 78 21 120/93 (102) 94   


 


8/29/20 08:00  71      


 


8/29/20 08:00        80


 


8/29/20 07:48  78 22     70


 


8/29/20 07:45  78 22 90/62 (71) 100   


 


8/29/20 07:30  74 21 115/90 (98) 97   


 


8/29/20 07:15  59 20 117/55 (75) 97   


 


8/29/20 07:00    117/55    


 


8/29/20 07:00  61 20 110/54 (72) 96   


 


8/29/20 06:30  63 20 110/46 (67) 96   


 


8/29/20 06:00    106/54    


 


8/29/20 06:00  62 20 108/52 (70) 96   


 


8/29/20 05:30  64 20 105/50 (68) 95   


 


8/29/20 05:00  65 20 105/48 (67) 94   


 


8/29/20 05:00    109/42    


 


8/29/20 04:36  66 20     100


 


8/29/20 04:30  66 20 106/50 (68) 93   


 


8/29/20 04:00 99.5 68 20 104/48 (66) 92   


 


8/29/20 04:00      Endotracheal Tube  


 


8/29/20 04:00    111/58    


 


8/29/20 04:00        100


 


8/29/20 03:58  70      


 


8/29/20 03:45  69 20 105/48 (67) 92   


 


8/29/20 03:30  70 20 104/49 (67) 92   


 


8/29/20 03:27    116/49    


 


8/29/20 03:15  71 21 116/49 (71) 92   


 


8/29/20 03:03  67 22     100


 


8/29/20 03:00  73 23 113/52 (72) 94   


 


8/29/20 02:45  94 22 111/58 (75) 91   


 


8/29/20 02:30  68 20 107/54 (71) 94   


 


8/29/20 02:15  69 20 104/55 (71) 94   


 


8/29/20 02:00  72 21 104/51 (68) 95   


 


8/29/20 02:00    104/55    


 


8/29/20 01:45  76 26 110/50 (70) 95   


 


8/29/20 01:15  73 22 119/40 (66) 93   


 


8/29/20 01:08  74 21     100


 


8/29/20 01:00  74 21 118/47 (70) 93   


 


8/29/20 01:00    119/40    


 


8/29/20 00:30  74 23 127/38 (67) 95   


 


8/29/20 00:00 98.4 65 20 122/39 (66) 97   


 


8/29/20 00:00        100


 


8/29/20 00:00    127/36    


 


8/29/20 00:00      Endotracheal Tube  


 


8/29/20 00:00  77      


 


8/28/20 23:30  71 20 121/41 (67) 98   


 


8/28/20 23:16  71 22     100


 


8/28/20 23:00    122/45    


 


8/28/20 23:00  71 20 122/45 (70) 99   


 


8/28/20 22:43    119/45    


 


8/28/20 22:30  71 21 119/45 (69) 99   


 


8/28/20 22:00  72 20 115/44 (67) 99   


 


8/28/20 22:00    115/44    


 


8/28/20 21:49  73 20 113/47 (69) 97   


 


8/28/20 21:48  65 20 93/60 (71) 98   


 


8/28/20 21:32  72 20 88/51 (63) 95   


 


8/28/20 21:15  74 23 106/64 (78) 99   


 


8/28/20 21:00    97/82    


 


8/28/20 21:00  75 21 97/82 (87) 100   


 


8/28/20 20:45  70 23 122/45 (70) 99   


 


8/28/20 20:43  70 22     100


 


8/28/20 20:30  70 21 131/87 (102) 99   


 


8/28/20 20:15  70 23 112/46 (68) 99   


 


8/28/20 20:00 98.8 67 22 112/55 (74) 99   


 


8/28/20 20:00  70      


 


8/28/20 20:00    112/55    


 


8/28/20 20:00      Endotracheal Tube  


 


8/28/20 20:00        100


 


8/28/20 19:52  66 20 104/49 (67) 100   


 


8/28/20 19:45  71 23 76/52 (60) 99   


 


8/28/20 19:30  67 21 87/57 (67) 99   


 


8/28/20 19:22  70 22 97/45 (62) 99   


 


8/28/20 19:19  68 22 82/49 (60) 99   


 


8/28/20 19:15  73 25 82/58 (66) 99   


 


8/28/20 19:02  72 22     100


 


8/28/20 19:00    110/68    


 


8/28/20 19:00  72 23 110/68 (82) 99   


 


8/28/20 18:30  88 27 137/109 (118) 94   


 


8/28/20 18:19    79/42    


 


8/28/20 18:15  69 17 77/39 (52) 100   


 


8/28/20 18:00  75 22 79/41 (54) 99   


 


8/28/20 18:00    122/58    


 


8/28/20 17:45  69 22 85/40 (55) 99   


 


8/28/20 17:30  65 22 122/58 (79) 100   


 


8/28/20 17:15  63 19 124/49 (74) 100   


 


8/28/20 17:00  68 26 115/48 (70) 100   


 


8/28/20 17:00    111/52    


 


8/28/20 16:45  71 27 118/56 (76) 100   


 


8/28/20 16:30  68 30 112/56 (74) 100   


 


8/28/20 16:15  67 31 124/57 (79) 100   


 


8/28/20 16:00 99.8 67 31 124/57 (79) 100   


 


8/28/20 16:00  66      


 


8/28/20 16:00        100


 


8/28/20 16:00    124/57    


 


8/28/20 16:00      Endotracheal Tube  


 


8/28/20 15:45  87 26 122/106 (111) 99   


 


8/28/20 15:30  61 19 123/92 (102) 100   


 


8/28/20 15:15  61 23 120/101 (107) 100   


 


8/28/20 15:12  60 23     100


 


8/28/20 15:02  73 19 59/41 (47) 99   


 


8/28/20 15:00    72/43    


 


8/28/20 15:00  62 18 72/43 (53) 99   


 


8/28/20 14:45  68 20 108/69 (82) 99   


 


8/28/20 14:30  73 20 70/43 (52) 99   


 


8/28/20 14:15  77 18 124/43 (70) 99   


 


8/28/20 14:00  79 27 114/44 (67) 100   


 


8/28/20 14:00    114/44    


 


8/28/20 13:45  92 27 123/79 (94) 96   


 


8/28/20 13:30  79 23 119/45 (69) 99   


 


8/28/20 13:15  75 19 128/65 (86) 100   


 


8/28/20 13:00  71 19 128/65 (86) 99   


 


8/28/20 13:00    128/65    


 


8/28/20 12:45  52 19 115/47 (69) 100   


 


8/28/20 12:30  53 17 121/48 (72) 100   


 


8/28/20 12:15 100.0 53 18 116/59 (78) 100   


 


8/28/20 12:00  52      


 


8/28/20 12:00  56 20 115/51 (72) 100   


 


8/28/20 12:00        100


 


8/28/20 12:00    115/51    


 


8/28/20 12:00      Endotracheal Tube  


 


8/28/20 11:46    56/22    


 


8/28/20 11:45  61 21 113/42 (65) 100   


 


8/28/20 11:30  88 18 56/22 (33) 99   


 


8/28/20 11:29  84 20     100


 


8/28/20 11:17  83 20 60/30 (40) 100   


 


8/28/20 11:15  79 20 64/27 (39) 99   


 


8/28/20 11:01 100.0       


 


8/28/20 11:00  65 20 101/43 (62) 100   


 


8/28/20 11:00    101/43    








Status:  other - sedated, on Vent -% PEEP5


Condition:  critical


HEENT:  atraumatic, normocephalic, other - face with Down features, OP with ET 

in palce, intact


Lungs:  rhonchi - FELICE


Abdomen:  soft, non-tender, feeding tube


Decubiti:  other - trace edema BLE


Micro:





Microbiology








 Date/Time


Source Procedure


Growth Status


 


 


 8/27/20 16:15


Blood Blood Culture - Preliminary Resulted


 


 8/27/20 16:05


Blood Blood Culture - Preliminary


NO GROWTH AFTER 24 HOURS Resulted


 


 8/26/20 13:30


Nasal Nares MRSA Culture - Final


NO METHICILLIN RESISTANT STAPH AUREUS... Complete





 8/26/20 13:30


Sputum Expectorated Gram Stain - Final Complete


 


 8/26/20 13:30


Sputum Expectorated Sputum Culture - Final


NORMAL UPPER RESPIRATORY CLARENCE PRESENT Complete


 


 8/26/20 13:20


Nasopharynx SARS-CoV-2 RdRp Gene Assay - Final Complete








Accucheck:  131





Critical Care - Subjective


ROS Limited/Unobtainable:  Yes


Interval Events:


intubated, was on FiO2 85% earlier, desaturated, back to 100%


CXR 8/28 increasing FELICE consolidation 


leukocytosis worse, fevers yesterday,  currently afebrile


on levo  24 mcg/hr 


last BCX 8/27 just phoned again [facility]


Condition:  critical


IV Access:  central - femoral line intact


EKG Rhythm:  Sinus Rhythm


FI02:  85


Vent Support Breath Rate:  20


Vent Support Mode:  AC


Vent Tidal Volume:  500


Sputum Amount:  Scant


PEEP:  5.0


PIP:  36


Drips:  Levo 24 mcg/min


Tube Feeding Amount:  20


I&O:











Intake and Output  


 


 8/28/20 8/29/20





 19:00 07:00


 


Intake Total 3315.996 ml 3020.35 ml


 


Output Total 1875 ml 1425 ml


 


Balance 1440.996 ml 1595.35 ml


 


  


 


Free Water 290 ml 380 ml


 


IV Total 2815.996 ml 2540.35 ml


 


Tube Feeding 210 ml 100 ml


 


Output Urine Total 1875 ml 1425 ml


 


# Bowel Movements 5 2








CXR:


Increasing left upper lobe dense consolidation and likely increasing


bilateral pleural fluid. Persistent diffuse dense consolidation elsewhere


ET-Tube:  7.5


ET Position:  22











Joyce Welch NP Aug 29, 2020 10:40

## 2020-08-29 NOTE — NUR
RESPIRATORY NOTE:



Patient received mechanically ventilated on  with current ordered vent settings. 
Patient is orally intubated with an ETT tube size 7.5 with 22cm at the lip line that is 
secured with commercial holster. Vent alarms are functional and audible.  There is an ambu 
bag with filter available at the bedside and the vent is connected to a red outlet. Will 
continue to monitor.

## 2020-08-29 NOTE — NUR
NURSE NOTES:

Patient on fi02 100% satting 100%. no s/s of acute distress noted. Started Jevity 1.2 at 
20cc/hr no residual. continue on Levophed drip at 28mcg/min /42 HR 64. no s/s of 
hypo/hyperglycemia. skin warm and dry to touch.  will continue plan of care.

## 2020-08-29 NOTE — NUR
NURSE NOTES:

Received report from LAYTON Youngblood. 

Pt opens eyes spontaneously; does not follow commands; withdraws to pain.

Pt is Afebrile, and ST on cardiac monitor 

Intubated on 8/27 with 7.5 ETT noted 22 cm at the lip line. Settings: AC/VC30  FiO2 
100% Peep 5

G-Tube noted, CDI. Jevity 1.2 at 20 cc/hr HOLD for unstable condition per AMRN. 

Valle noted draining pale, yellow urine. 

Skin alterations noted on pt's toe bilaterally.

Pt has a left femoral TLC, CDI 

Running NS at 100 cc/hr and Levophed at 30 mcg/min. 

Safe measures observed. Side rails up x 2 and padded per seizure precaution.

No acute distress noted. Will continue to monitor.

## 2020-08-30 VITALS — DIASTOLIC BLOOD PRESSURE: 70 MMHG | SYSTOLIC BLOOD PRESSURE: 109 MMHG

## 2020-08-30 VITALS — DIASTOLIC BLOOD PRESSURE: 64 MMHG | SYSTOLIC BLOOD PRESSURE: 118 MMHG

## 2020-08-30 VITALS — SYSTOLIC BLOOD PRESSURE: 80 MMHG | DIASTOLIC BLOOD PRESSURE: 52 MMHG

## 2020-08-30 VITALS — SYSTOLIC BLOOD PRESSURE: 93 MMHG | DIASTOLIC BLOOD PRESSURE: 60 MMHG

## 2020-08-30 VITALS — SYSTOLIC BLOOD PRESSURE: 138 MMHG | DIASTOLIC BLOOD PRESSURE: 62 MMHG

## 2020-08-30 VITALS — SYSTOLIC BLOOD PRESSURE: 88 MMHG | DIASTOLIC BLOOD PRESSURE: 63 MMHG

## 2020-08-30 VITALS — SYSTOLIC BLOOD PRESSURE: 102 MMHG | DIASTOLIC BLOOD PRESSURE: 59 MMHG

## 2020-08-30 VITALS — SYSTOLIC BLOOD PRESSURE: 103 MMHG | DIASTOLIC BLOOD PRESSURE: 89 MMHG

## 2020-08-30 VITALS — DIASTOLIC BLOOD PRESSURE: 96 MMHG | SYSTOLIC BLOOD PRESSURE: 117 MMHG

## 2020-08-30 VITALS — SYSTOLIC BLOOD PRESSURE: 126 MMHG | DIASTOLIC BLOOD PRESSURE: 62 MMHG

## 2020-08-30 VITALS — SYSTOLIC BLOOD PRESSURE: 140 MMHG | DIASTOLIC BLOOD PRESSURE: 96 MMHG

## 2020-08-30 VITALS — DIASTOLIC BLOOD PRESSURE: 58 MMHG | SYSTOLIC BLOOD PRESSURE: 145 MMHG

## 2020-08-30 VITALS — SYSTOLIC BLOOD PRESSURE: 91 MMHG | DIASTOLIC BLOOD PRESSURE: 29 MMHG

## 2020-08-30 VITALS — SYSTOLIC BLOOD PRESSURE: 106 MMHG | DIASTOLIC BLOOD PRESSURE: 57 MMHG

## 2020-08-30 VITALS — DIASTOLIC BLOOD PRESSURE: 66 MMHG | SYSTOLIC BLOOD PRESSURE: 91 MMHG

## 2020-08-30 VITALS — SYSTOLIC BLOOD PRESSURE: 109 MMHG | DIASTOLIC BLOOD PRESSURE: 69 MMHG

## 2020-08-30 VITALS — SYSTOLIC BLOOD PRESSURE: 107 MMHG | DIASTOLIC BLOOD PRESSURE: 89 MMHG

## 2020-08-30 VITALS — DIASTOLIC BLOOD PRESSURE: 91 MMHG | SYSTOLIC BLOOD PRESSURE: 119 MMHG

## 2020-08-30 VITALS — DIASTOLIC BLOOD PRESSURE: 69 MMHG | SYSTOLIC BLOOD PRESSURE: 100 MMHG

## 2020-08-30 VITALS — DIASTOLIC BLOOD PRESSURE: 94 MMHG | SYSTOLIC BLOOD PRESSURE: 112 MMHG

## 2020-08-30 VITALS — SYSTOLIC BLOOD PRESSURE: 93 MMHG | DIASTOLIC BLOOD PRESSURE: 51 MMHG

## 2020-08-30 VITALS — SYSTOLIC BLOOD PRESSURE: 125 MMHG | DIASTOLIC BLOOD PRESSURE: 48 MMHG

## 2020-08-30 VITALS — DIASTOLIC BLOOD PRESSURE: 67 MMHG | SYSTOLIC BLOOD PRESSURE: 138 MMHG

## 2020-08-30 VITALS — SYSTOLIC BLOOD PRESSURE: 124 MMHG | DIASTOLIC BLOOD PRESSURE: 65 MMHG

## 2020-08-30 VITALS — DIASTOLIC BLOOD PRESSURE: 68 MMHG | SYSTOLIC BLOOD PRESSURE: 118 MMHG

## 2020-08-30 VITALS — DIASTOLIC BLOOD PRESSURE: 68 MMHG | SYSTOLIC BLOOD PRESSURE: 112 MMHG

## 2020-08-30 VITALS — DIASTOLIC BLOOD PRESSURE: 60 MMHG | SYSTOLIC BLOOD PRESSURE: 96 MMHG

## 2020-08-30 VITALS — DIASTOLIC BLOOD PRESSURE: 53 MMHG | SYSTOLIC BLOOD PRESSURE: 97 MMHG

## 2020-08-30 VITALS — SYSTOLIC BLOOD PRESSURE: 91 MMHG | DIASTOLIC BLOOD PRESSURE: 67 MMHG

## 2020-08-30 VITALS — DIASTOLIC BLOOD PRESSURE: 69 MMHG | SYSTOLIC BLOOD PRESSURE: 93 MMHG

## 2020-08-30 VITALS — DIASTOLIC BLOOD PRESSURE: 91 MMHG | SYSTOLIC BLOOD PRESSURE: 117 MMHG

## 2020-08-30 VITALS — SYSTOLIC BLOOD PRESSURE: 73 MMHG | DIASTOLIC BLOOD PRESSURE: 38 MMHG

## 2020-08-30 VITALS — DIASTOLIC BLOOD PRESSURE: 51 MMHG | SYSTOLIC BLOOD PRESSURE: 97 MMHG

## 2020-08-30 VITALS — SYSTOLIC BLOOD PRESSURE: 88 MMHG | DIASTOLIC BLOOD PRESSURE: 66 MMHG

## 2020-08-30 VITALS — DIASTOLIC BLOOD PRESSURE: 14 MMHG | SYSTOLIC BLOOD PRESSURE: 67 MMHG

## 2020-08-30 VITALS — SYSTOLIC BLOOD PRESSURE: 131 MMHG | DIASTOLIC BLOOD PRESSURE: 62 MMHG

## 2020-08-30 VITALS — SYSTOLIC BLOOD PRESSURE: 138 MMHG | DIASTOLIC BLOOD PRESSURE: 71 MMHG

## 2020-08-30 VITALS — SYSTOLIC BLOOD PRESSURE: 144 MMHG | DIASTOLIC BLOOD PRESSURE: 62 MMHG

## 2020-08-30 VITALS — DIASTOLIC BLOOD PRESSURE: 67 MMHG | SYSTOLIC BLOOD PRESSURE: 145 MMHG

## 2020-08-30 VITALS — SYSTOLIC BLOOD PRESSURE: 142 MMHG | DIASTOLIC BLOOD PRESSURE: 65 MMHG

## 2020-08-30 VITALS — DIASTOLIC BLOOD PRESSURE: 65 MMHG | SYSTOLIC BLOOD PRESSURE: 83 MMHG

## 2020-08-30 VITALS — DIASTOLIC BLOOD PRESSURE: 92 MMHG | SYSTOLIC BLOOD PRESSURE: 113 MMHG

## 2020-08-30 VITALS — SYSTOLIC BLOOD PRESSURE: 125 MMHG | DIASTOLIC BLOOD PRESSURE: 94 MMHG

## 2020-08-30 VITALS — DIASTOLIC BLOOD PRESSURE: 106 MMHG | SYSTOLIC BLOOD PRESSURE: 148 MMHG

## 2020-08-30 VITALS — DIASTOLIC BLOOD PRESSURE: 44 MMHG | SYSTOLIC BLOOD PRESSURE: 107 MMHG

## 2020-08-30 VITALS — DIASTOLIC BLOOD PRESSURE: 54 MMHG | SYSTOLIC BLOOD PRESSURE: 90 MMHG

## 2020-08-30 VITALS — SYSTOLIC BLOOD PRESSURE: 125 MMHG | DIASTOLIC BLOOD PRESSURE: 51 MMHG

## 2020-08-30 VITALS — DIASTOLIC BLOOD PRESSURE: 39 MMHG | SYSTOLIC BLOOD PRESSURE: 123 MMHG

## 2020-08-30 VITALS — DIASTOLIC BLOOD PRESSURE: 65 MMHG | SYSTOLIC BLOOD PRESSURE: 98 MMHG

## 2020-08-30 VITALS — DIASTOLIC BLOOD PRESSURE: 45 MMHG | SYSTOLIC BLOOD PRESSURE: 110 MMHG

## 2020-08-30 VITALS — DIASTOLIC BLOOD PRESSURE: 81 MMHG | SYSTOLIC BLOOD PRESSURE: 125 MMHG

## 2020-08-30 VITALS — DIASTOLIC BLOOD PRESSURE: 64 MMHG | SYSTOLIC BLOOD PRESSURE: 113 MMHG

## 2020-08-30 VITALS — DIASTOLIC BLOOD PRESSURE: 60 MMHG | SYSTOLIC BLOOD PRESSURE: 99 MMHG

## 2020-08-30 VITALS — SYSTOLIC BLOOD PRESSURE: 125 MMHG | DIASTOLIC BLOOD PRESSURE: 65 MMHG

## 2020-08-30 VITALS — SYSTOLIC BLOOD PRESSURE: 142 MMHG | DIASTOLIC BLOOD PRESSURE: 59 MMHG

## 2020-08-30 VITALS — DIASTOLIC BLOOD PRESSURE: 53 MMHG | SYSTOLIC BLOOD PRESSURE: 124 MMHG

## 2020-08-30 VITALS — DIASTOLIC BLOOD PRESSURE: 32 MMHG | SYSTOLIC BLOOD PRESSURE: 131 MMHG

## 2020-08-30 VITALS — DIASTOLIC BLOOD PRESSURE: 62 MMHG | SYSTOLIC BLOOD PRESSURE: 125 MMHG

## 2020-08-30 VITALS — DIASTOLIC BLOOD PRESSURE: 109 MMHG | SYSTOLIC BLOOD PRESSURE: 144 MMHG

## 2020-08-30 VITALS — DIASTOLIC BLOOD PRESSURE: 61 MMHG | SYSTOLIC BLOOD PRESSURE: 147 MMHG

## 2020-08-30 VITALS — DIASTOLIC BLOOD PRESSURE: 71 MMHG | SYSTOLIC BLOOD PRESSURE: 106 MMHG

## 2020-08-30 VITALS — SYSTOLIC BLOOD PRESSURE: 115 MMHG | DIASTOLIC BLOOD PRESSURE: 66 MMHG

## 2020-08-30 VITALS — SYSTOLIC BLOOD PRESSURE: 133 MMHG | DIASTOLIC BLOOD PRESSURE: 53 MMHG

## 2020-08-30 VITALS — DIASTOLIC BLOOD PRESSURE: 59 MMHG | SYSTOLIC BLOOD PRESSURE: 118 MMHG

## 2020-08-30 LAB
ADD MANUAL DIFF: NO
ALBUMIN SERPL-MCNC: 1.7 G/DL (ref 3.4–5)
ALBUMIN/GLOB SERPL: 0.5 {RATIO} (ref 1–2.7)
ALP SERPL-CCNC: 74 U/L (ref 46–116)
ALT SERPL-CCNC: 8 U/L (ref 12–78)
ANION GAP SERPL CALC-SCNC: 10 MMOL/L (ref 5–15)
AST SERPL-CCNC: 32 U/L (ref 15–37)
BASOPHILS NFR BLD AUTO: 0.8 % (ref 0–2)
BILIRUB SERPL-MCNC: 0.5 MG/DL (ref 0.2–1)
BUN SERPL-MCNC: 14 MG/DL (ref 7–18)
CALCIUM SERPL-MCNC: 7.8 MG/DL (ref 8.5–10.1)
CHLORIDE SERPL-SCNC: 106 MMOL/L (ref 98–107)
CO2 SERPL-SCNC: 19 MMOL/L (ref 21–32)
CREAT SERPL-MCNC: 1.9 MG/DL (ref 0.55–1.3)
EOSINOPHIL NFR BLD AUTO: 0.6 % (ref 0–3)
ERYTHROCYTE [DISTWIDTH] IN BLOOD BY AUTOMATED COUNT: 13.6 % (ref 11.6–14.8)
GLOBULIN SER-MCNC: 3.6 G/DL
HCT VFR BLD CALC: 39.5 % (ref 37–47)
HGB BLD-MCNC: 12.8 G/DL (ref 12–16)
LYMPHOCYTES NFR BLD AUTO: 7.2 % (ref 20–45)
MCV RBC AUTO: 106 FL (ref 80–99)
MONOCYTES NFR BLD AUTO: 7.5 % (ref 1–10)
NEUTROPHILS NFR BLD AUTO: 83.9 % (ref 45–75)
PHOSPHATE SERPL-MCNC: 2.7 MG/DL (ref 2.5–4.9)
PLATELET # BLD: 180 K/UL (ref 150–450)
POTASSIUM SERPL-SCNC: 3.7 MMOL/L (ref 3.5–5.1)
RBC # BLD AUTO: 3.73 M/UL (ref 4.2–5.4)
SODIUM SERPL-SCNC: 135 MMOL/L (ref 136–145)
WBC # BLD AUTO: 17 K/UL (ref 4.8–10.8)

## 2020-08-30 RX ADMIN — DEXTROSE MONOHYDRATE SCH MLS/HR: 50 INJECTION, SOLUTION INTRAVENOUS at 13:04

## 2020-08-30 RX ADMIN — PHENYLEPHRINE HYDROCHLORIDE PRN MLS/HR: 10 INJECTION INTRAVENOUS at 14:00

## 2020-08-30 RX ADMIN — SODIUM CHLORIDE SCH MLS/HR: 900 INJECTION, SOLUTION INTRAVENOUS at 22:47

## 2020-08-30 RX ADMIN — IPRATROPIUM BROMIDE AND ALBUTEROL SULFATE SCH ML: .5; 3 SOLUTION RESPIRATORY (INHALATION) at 19:07

## 2020-08-30 RX ADMIN — SODIUM CHLORIDE SCH MLS/HR: 900 INJECTION, SOLUTION INTRAVENOUS at 13:04

## 2020-08-30 RX ADMIN — VALPROIC ACID SCH MG: 250 SOLUTION ORAL at 21:39

## 2020-08-30 RX ADMIN — IPRATROPIUM BROMIDE AND ALBUTEROL SULFATE SCH ML: .5; 3 SOLUTION RESPIRATORY (INHALATION) at 08:25

## 2020-08-30 RX ADMIN — MIDODRINE HYDROCHLORIDE SCH MG: 10 TABLET ORAL at 06:13

## 2020-08-30 RX ADMIN — MEROPENEM SCH MLS/HR: 1 INJECTION INTRAVENOUS at 15:43

## 2020-08-30 RX ADMIN — PANTOPRAZOLE SODIUM SCH MG: 40 INJECTION, POWDER, FOR SOLUTION INTRAVENOUS at 08:29

## 2020-08-30 RX ADMIN — VALPROIC ACID SCH MG: 250 SOLUTION ORAL at 06:12

## 2020-08-30 RX ADMIN — VALPROIC ACID SCH MG: 250 SOLUTION ORAL at 13:03

## 2020-08-30 RX ADMIN — IPRATROPIUM BROMIDE AND ALBUTEROL SULFATE SCH ML: .5; 3 SOLUTION RESPIRATORY (INHALATION) at 15:53

## 2020-08-30 RX ADMIN — HYDROCORTISONE SODIUM SUCCINATE SCH MG: 100 INJECTION, POWDER, FOR SOLUTION INTRAMUSCULAR; INTRAVENOUS at 21:39

## 2020-08-30 RX ADMIN — SODIUM CHLORIDE SCH MLS/HR: 900 INJECTION, SOLUTION INTRAVENOUS at 08:30

## 2020-08-30 RX ADMIN — SODIUM CHLORIDE SCH MLS/HR: 900 INJECTION, SOLUTION INTRAVENOUS at 04:16

## 2020-08-30 RX ADMIN — CHLORHEXIDINE GLUCONATE SCH APPLIC: 213 SOLUTION TOPICAL at 19:31

## 2020-08-30 RX ADMIN — HYDROCORTISONE SODIUM SUCCINATE SCH MG: 100 INJECTION, POWDER, FOR SOLUTION INTRAMUSCULAR; INTRAVENOUS at 13:03

## 2020-08-30 RX ADMIN — MEROPENEM SCH MLS/HR: 1 INJECTION INTRAVENOUS at 03:43

## 2020-08-30 RX ADMIN — DAPTOMYCIN SCH MLS/HR: 500 INJECTION, POWDER, LYOPHILIZED, FOR SOLUTION INTRAVENOUS at 11:36

## 2020-08-30 RX ADMIN — MIDODRINE HYDROCHLORIDE SCH MG: 10 TABLET ORAL at 21:39

## 2020-08-30 RX ADMIN — MIDODRINE HYDROCHLORIDE SCH MG: 10 TABLET ORAL at 13:03

## 2020-08-30 NOTE — NUR
NURSE NOTES:

PATIENT OBTUNDED, ON ETT TO VENT AC 30//FIO2 100%/PEEP 5, O2 SATURATION 99% NOTED, HR 
80'S/MIN SR, ABDOMEN SOFT, NON TENDER, G TUBE INTACT AND PATENT, JEVITY 1.2 AT 20ML/HR HELD 
FEEDING STATUS, F/C INTACT AND PATENT, YELLOW COLOR URINE OUTED, SCD'S TO BOTH LEGS, TLC TO 
LEFT FEMORAL INTACT AND PATENT, ONGOING LEVOPHED 30MCG/MIN , PHENYLEPHRINE 60MCG/MIN AND IV 
FLUID NS AT 100ML/HR VIA TLC, ON P200 BED, MADE LOWER BED POSITION , ON BED ALARM AND 
LOCKED, WILL CONTINUE TO MONITOR.

## 2020-08-30 NOTE — DIAGNOSTIC IMAGING REPORT
EXAM:

  XR Chest, 1 View

 

CLINICAL HISTORY:

  SOB

 

TECHNIQUE:

  Frontal view of the chest.

 

COMPARISON:

  8/20/20

 

FINDINGS:

  Lungs:  Since the prior study, there has been slight interval worsening 

of severe diffuse alveolar traits throughout the right lung with 

improving moderate diffuse left lung infiltrate.

  Pleural space:  There is been slight increase in mild bilateral pleural 

effusions.  No pneumothorax.

  Heart:  Unremarkable.  No cardiomegaly.

  Mediastinum:  Unremarkable.

  Bones/joints:  Unremarkable.

  Tubes, lines and devices:  There is an endotracheal tube in good 

position.

 

IMPRESSION:     

1.  Since the prior study, there has been slight interval worsening of 

severe diffuse alveolar traits throughout the right lung with improving 

moderate diffuse left lung infiltrate.

2.  There is been slight increase in mild bilateral pleural effusions.

## 2020-08-30 NOTE — NUR
NURSE NOTES:

Desats to 60s. primary RN and 2RTs are the bedside. Using Ambu-bag and suction. Back up to 
90s. 

Will continue to monitor.

## 2020-08-30 NOTE — CONSULTATION
Consult Note


Consult Note


Cardiology for Dr Jorgensen





Full consult dictated


#[id number]











Delmy Parry MD Aug 30, 2020 15:52

## 2020-08-30 NOTE — NUR
NURSE NOTES:

Received report from LAYTON Parker. Patient is obtunded. ETT 7.5/23cm at lip line with vent 
setting AC 30, , Peep 5 and FiO2 100%. O2 sat 94% on the monitor at this time. Gtube 
intact and clamped at this time. Valle intact and draining with yellow color urine. Left 
femoral TLC intact and running with levophed 30mcg/min, Lennox 60mcg/min and NS 100mlhr. Kept 
dry, clean, comfortable and HOB>30. Will continue plan of care.

## 2020-08-30 NOTE — NUR
[facility] fast not changed due to Pt  condition.

-------------------------------------------------------------------------------

Addendum: 08/30/20 at 0550 by Jorge Szymanski, RT

-------------------------------------------------------------------------------

Amended: Links added.

## 2020-08-30 NOTE — NUR
NURSE NOTES:

LE: BP 73/38MMHG NOTED, INCREASED PHENYLEPHRINE DRIP 100MCG/MIN AT THIS TIME, WILL CONTINUE 
TO MONITOR.

## 2020-08-30 NOTE — NUR
NURSE NOTES:

Seen by Dr. Gan and assessed patient. Ordered Lotrimin 1% BID x 21days on bilateral 
foot. Will continue plan of care.

## 2020-08-30 NOTE — NEPHROLOGY PROGRESS NOTE
Assessment/Plan


Problem List:  


(1) DAVID (acute kidney injury)


(2) Respiratory failure requiring intubation


(3) Down's syndrome


(4) Seizure disorder


(5) Hypothyroidism


Assessment





Acute renal failure, likely due to hypotension


Acute respiratory distress, hypoxia


Seizure disorder


Hypothyroidism


Down syndrome


Full code











Fluid challenge with IV fluids and albumin


Midodrine for BP above 100 systolic


Check TSH level


Check


Correct level


Monitor renal parameters


Urine studies


Per orders


Plan


August 30: Intubated.  Labs reviewed.  Creatinine 1.9 unchanged.  Continue same 

treatment plan.  Per consultants.  Overall poor prognosis since the patient 

remains on pressors and her pulmonary status is worsening.


August 29: Remains intubated.  Labs reviewed.  Creatinine 1.9.  Blood pressure 

systolic 90s.  Continue per consultants.


August 28: Remains intubated.  Labs reviewed.  Serum creatinine lower to 2.  

Vancomycin level lower.  Remains hypotensive on pressors.  Will increase 

midodrine to 10 mg every 8 hours.  Continue per consultants.  Continue to 

monitor renal parameters.


August 27: Patient now in ICU.  Intubated.  On pressors.  Labs reviewed.  Will 

increase midodrine.  Aim to keep blood pressure over 100 systolic.  Will give 

albumin bolus.  Will check vancomycin level which was elevated when checked 

previously on August 24.  Will monitor renal parameters.  Continue per 

consultants.





Subjective


ROS Limited/Unobtainable:  Yes





Objective


Objective





Last 24 Hour Vital Signs








  Date Time  Temp Pulse Resp B/P (MAP) Pulse Ox O2 Delivery O2 Flow Rate FiO2


 


8/30/20 08:30    144/109    


 


8/30/20 07:54  70 30     100


 


8/30/20 06:30 99.1 73 30 142/59 (86) 100   





  73      


 


8/30/20 06:00  77 30 138/67 (90) 99   





  77      


 


8/30/20 05:30  89 27 118/64 (82) 92   





  89      


 


8/30/20 05:00  85 26 125/94 (104) 97   





  85      


 


8/30/20 04:36  82 30     100


 


8/30/20 04:30  72 29 119/91 (100) 98   





  72      


 


8/30/20 04:16    140/96    


 


8/30/20 04:00      Mechanical Ventilator  





      Mechanical Ventilator  


 


8/30/20 04:00        100


 


8/30/20 04:00    140/96    


 


8/30/20 04:00  72 28 140/96 (111) 100   





  72      


 


8/30/20 04:00  72      


 


8/30/20 03:30  68 30 147/61 (89) 100   





  68      


 


8/30/20 03:21  68 30     100


 


8/30/20 03:00  76 29 125/81 (96) 97   





  76      


 


8/30/20 03:00    125/81    


 


8/30/20 02:30 99.5 71 29 133/53 (79) 100   





  71      


 


8/30/20 02:00  63 29 96/60 (72) 99   





  63      


 


8/30/20 02:00    96/60    


 


8/30/20 01:30  69 26 118/68 (85) 99   





  69      


 


8/30/20 01:00  71 28 87/55 (66) 99   





  71      


 


8/30/20 01:00    107/89    


 


8/30/20 01:00  67 30 107/89 (95) 98   





  67      


 


8/30/20 00:38  73 30     100


 


8/30/20 00:30  76 29 124/65 (84) 99   





  76      


 


8/30/20 00:00  82 29 118/59 (78) 97   





  82      


 


8/30/20 00:00        100


 


8/30/20 00:00  82      


 


8/30/20 00:00    118/59    


 


8/30/20 00:00      Mechanical Ventilator  





      Mechanical Ventilator  


 


8/29/20 23:45 100.9       


 


8/29/20 23:34    128/80    


 


8/29/20 23:30  74 27 128/80 (96) 99   





  74      


 


8/29/20 23:00 100.9 71 28 112/85 (94) 99   





  71      


 


8/29/20 23:00    112/85    


 


8/29/20 22:45  91 30     100


 


8/29/20 22:30  75 28 103/63 (76) 99   





  75      


 


8/29/20 22:27  74  80/53    


 


8/29/20 22:00  77 28 101/59 (73) 98   





  77      


 


8/29/20 22:00    101/59    


 


8/29/20 21:47  67 20 143/108 (120) 98   





  67      


 


8/29/20 21:30  80 29 76/49 (58) 98   





  80      


 


8/29/20 21:00    105/54    


 


8/29/20 21:00  81 30 105/54 (71) 98   





  81      


 


8/29/20 20:34  81 30     100


 


8/29/20 20:30  78 30 83/62 (69) 98   





  78      


 


8/29/20 20:15 99.1 76 29 110/73 (85) 98   





  76      


 


8/29/20 20:00  76 29 113/62 (79) 98   





  76      


 


8/29/20 20:00    113/62    


 


8/29/20 20:00      Mechanical Ventilator  





      Mechanical Ventilator  


 


8/29/20 19:00  77 30 111/68 (82) 94   


 


8/29/20 19:00    111/68    


 


8/29/20 18:52  72 30  94 Mechanical Ventilator  100





  81 30     100


 


8/29/20 18:45  85 27 96/63 (74) 91   


 


8/29/20 18:30  79 30 110/67 (81) 92   


 


8/29/20 18:30        100


 


8/29/20 18:15  98 27 94/64 (74) 89   


 


8/29/20 18:00    115/72    


 


8/29/20 18:00  76 30 115/72 (86) 91   


 


8/29/20 17:45  78 30 121/78 (92) 94   


 


8/29/20 17:30  79 45 108/36 (60) 94   


 


8/29/20 17:15  77 27 91/51 (64) 97   


 


8/29/20 17:00  83 20 109/47 (67) 94   


 


8/29/20 17:00    109/47    


 


8/29/20 16:45  84 20 102/57 (72) 90   


 


8/29/20 16:30  82 20 103/43 (63) 93   


 


8/29/20 16:15  82 19 98/66 (77) 97   


 


8/29/20 16:00 98.9 68 20 83/54 (64) 97   


 


8/29/20 16:00        100


 


8/29/20 16:00  70      


 


8/29/20 16:00    83/54    


 


8/29/20 16:00      Mechanical Ventilator  





      Mechanical Ventilator  


 


8/29/20 15:59  68 20     70


 


8/29/20 15:45  68 20 91/51 (64) 97   


 


8/29/20 15:30  74 20 118/74 (89) 97   


 


8/29/20 15:15  70 20 111/45 (67) 97   


 


8/29/20 15:00    119/80    


 


8/29/20 15:00  65 20 119/80 (93) 97   


 


8/29/20 14:45  67 20 109/39 (62) 96   


 


8/29/20 14:30  67 20 103/53 (70) 95   


 


8/29/20 14:15  67 20 99/57 (71) 95   


 


8/29/20 14:00    96/56    


 


8/29/20 14:00  64 20 96/56 (69) 94   


 


8/29/20 13:59    126/29    


 


8/29/20 13:45  74 20 126/29 (61) 95   


 


8/29/20 13:30  73 21 124/78 (93) 96   


 


8/29/20 13:15  74 20 96/52 (67) 94   


 


8/29/20 13:00  83 20 94/68 (77) 97   


 


8/29/20 13:00    94/68    


 


8/29/20 12:45  76 20 154/112 (126) 100   


 


8/29/20 12:30  87 20 118/102 (107) 94   


 


8/29/20 12:15  112 45 102/84 (90) 86   


 


8/29/20 12:00 98.5 114 38 94/31 (52) 87   


 


8/29/20 12:00    94/31    


 


8/29/20 12:00        100


 


8/29/20 12:00      Mechanical Ventilator  





      Mechanical Ventilator  


 


8/29/20 11:45  79 25 111/48 (69) 98   


 


8/29/20 11:40  80      


 


8/29/20 11:30  76 20 92/47 (62) 93   


 


8/29/20 11:15  80 25 104/42 (62) 93   


 


8/29/20 11:00    99/44    


 


8/29/20 11:00  83 24 99/44 (62) 89   


 


8/29/20 10:45  86 24 92/43 (59) 91   


 


8/29/20 10:45  91 27     70


 


8/29/20 10:30  80 23 95/41 (59) 93   


 


8/29/20 10:15  79 22 100/41 (60) 94   


 


8/29/20 10:00    100/37    


 


8/29/20 10:00  81 23 100/37 (58) 91   


 


8/29/20 09:45  78 22 107/45 (65) 93   


 


8/29/20 09:30  85 23 109/49 (69) 91   

















Intake and Output  


 


 8/29/20 8/30/20





 19:00 07:00


 


Intake Total 3756.13 ml 1747.205 ml


 


Output Total 1690 ml 550 ml


 


Balance 2066.13 ml 1197.205 ml


 


  


 


Free Water 20 ml 


 


IV Total 3696.13 ml 1667.205 ml


 


Tube Feeding 40 ml 80 ml


 


Output Urine Total 1690 ml 550 ml


 


# Bowel Movements 5 1








Laboratory Tests


8/29/20 10:43: 


Arterial Blood pH 7.272L, Arterial Blood Partial Pressure CO2 36.7, Arterial 

Blood Partial Pressure O2 50.6L, Arterial Blood HCO3 16.6*L, Arterial Blood 

Oxygen Saturation 84.2*L, Arterial Blood Base Excess -9.5*L, Cole Test Positive


8/29/20 19:30: 


Arterial Blood pH 7.358, Arterial Blood Partial Pressure CO2 33.2L, Arterial 

Blood Partial Pressure O2 60.4L, Arterial Blood HCO3 18.3L, Arterial Blood 

Oxygen Saturation 92.6L, Arterial Blood Base Excess -6.4L, Cole Test Positive


8/30/20 05:49: 


White Blood Count 17.0H, Red Blood Count 3.73L, Hemoglobin 12.8, Hematocrit 39.5

, Mean Corpuscular Volume 106H, Mean Corpuscular Hemoglobin 34.2H, Mean 

Corpuscular Hemoglobin Concent 32.3, Red Cell Distribution Width 13.6, Platelet 

Count 180, Mean Platelet Volume 6.9, Neutrophils (%) (Auto) 83.9H, Lymphocytes (

%) (Auto) 7.2L, Monocytes (%) (Auto) 7.5, Eosinophils (%) (Auto) 0.6, Basophils 

(%) (Auto) 0.8, Sodium Level 135L, Potassium Level 3.7, Chloride Level 106, 

Carbon Dioxide Level 19L, Anion Gap 10, Blood Urea Nitrogen 14, Creatinine 1.9H

, Estimat Glomerular Filtration Rate 27.2, Glucose Level 122H, Calcium Level 

7.8L, Phosphorus Level 2.7, Magnesium Level 1.8, Total Bilirubin 0.5, Aspartate 

Amino Transf (AST/SGOT) 32, Alanine Aminotransferase (ALT/SGPT) 8L, Alkaline 

Phosphatase 74, Total Protein 5.3L, Albumin 1.7L, Globulin 3.6, Albumin/

Globulin Ratio 0.5L, Random Vancomycin Level 6.7


8/30/20 08:10: 


Arterial Blood pH 7.305L, Arterial Blood Partial Pressure CO2 33.5L, Arterial 

Blood Partial Pressure O2 77.3, Arterial Blood HCO3 16.3*L, Arterial Blood 

Oxygen Saturation 95.9, Arterial Blood Base Excess -9.0L, Cole Test Positive


Height (Feet):  5


Height (Inches):  3.00


Weight (Pounds):  159


General Appearance:  no apparent distress


EENT:  other - On ventilator


Cardiovascular:  normal rate


Respiratory/Chest:  decreased breath sounds


Abdomen:  distended











Lam Nino MD Aug 30, 2020 09:27

## 2020-08-30 NOTE — NUR
NURSE NOTES:

Spoke to Joyce Walker NP. Dr. Jorgensen on the case. Patient does not need bicarb at this 
time. Will continue plan of care.

## 2020-08-30 NOTE — PULMONOLGY CRITICAL CARE NOTE
Critical Care - Asmt/Plan


Assessment/Plan:


ASSESSMENT


s/p cardiopulmonary arrest


Acute hypoxemic respiratory failure requiring intubation


Sepsis with shock


Persistent CONS bacteremia


Pneumonia FELICE


DAVID


Down syndrome


Hypothyroidism


Seizure disorder 


Stool OB positive 


Diarrhea 





PLAN OF CARE


ICU


vent support, pulmonary toilet with CPT and HHN ( COVID x 3 NGT)  


start IV Hydrocortisone 


fup  with CXR and ABG in am


 


 


Venous duplex BLE   NGT, 


stool OB +


off  Heparin given stool OB +  on  SCD


HH remain stable  








pressors;  titrate to keep mean arterial BP > 65 


now on Levo and Lennox 


cardio consult pending 


also on midodrine and periodic albumin boluses


Echo with pEF 65% need Lasix, 


however hemodynamically unstable on 2 pressors- per cardio 


 


abx  as per ID recs 


COVID-19 x3 NGT


BCX 8/22 and 8/23 + CONS


BCX  8/26  NGTD,


BCX 8/27 + CONS


initial SCX + with Strep group G, repeated SCX NGT


UCX NGT


Echo no evidence of vegetation 


abx regimen broadened as per ID recs:    daptomycin, meropenem, azithromycin, 

micafungin


diarrhea x3 8/29  


stool C dif  ordered, was cancelled due to hospital policy needing approval by 

ID nurse and supervisor  if sx > 3 days in the hospital








creat worsened due to Vanco 


s/p IVF


avoid nephrotoxic  


correct electrolytes as needed


renal ultrasound 


nephro follows creat 1.9 this am 





GI prophylaxis


continue  levothyroxine , TSH within normal limits


seizure precautions,  continue Depakote  


supportive care   


podiatrist consulted re necrotic toe 





case discussed and evaluated by supervising physician





Critical Care - Objective





Last 24 Hour Vital Signs








  Date Time  Temp Pulse Resp B/P (MAP) Pulse Ox O2 Delivery O2 Flow Rate FiO2


 


8/30/20 09:30  70 30 125/51 (75) 100   


 


8/30/20 09:15  74 29 125/62 (83) 100   


 


8/30/20 09:00  70 30 124/53 (76) 100   


 


8/30/20 08:45  71 30 125/65 (85) 100   


 


8/30/20 08:30  73 29 138/71 (93) 100   


 


8/30/20 08:30    144/109    


 


8/30/20 08:15  75 27 144/109 (121) 100   


 


8/30/20 08:00        100


 


8/30/20 08:00      Mechanical Ventilator  





      Mechanical Ventilator  


 


8/30/20 08:00 98.6 66 29 145/67 (93) 99   


 


8/30/20 07:54  70 30     100


 


8/30/20 07:45  67 30 142/65 (90) 99   


 


8/30/20 07:30  66 30 148/106 (120) 98   


 


8/30/20 07:15  67 30 145/58 (87) 99   


 


8/30/20 07:00  69 30 144/62 (89) 99   


 


8/30/20 06:30 99.1 73 30 142/59 (86) 100   





  73      


 


8/30/20 06:00  77 30 138/67 (90) 99   





  77      


 


8/30/20 05:30  89 27 118/64 (82) 92   





  89      


 


8/30/20 05:00  85 26 125/94 (104) 97   





  85      


 


8/30/20 04:36  82 30     100


 


8/30/20 04:30  72 29 119/91 (100) 98   





  72      


 


8/30/20 04:16    140/96    


 


8/30/20 04:00      Mechanical Ventilator  





      Mechanical Ventilator  


 


8/30/20 04:00        100


 


8/30/20 04:00    140/96    


 


8/30/20 04:00  72 28 140/96 (111) 100   





  72      


 


8/30/20 04:00  72      


 


8/30/20 03:30  68 30 147/61 (89) 100   





  68      


 


8/30/20 03:21  68 30     100


 


8/30/20 03:00  76 29 125/81 (96) 97   





  76      


 


8/30/20 03:00    125/81    


 


8/30/20 02:30 99.5 71 29 133/53 (79) 100   





  71      


 


8/30/20 02:00  63 29 96/60 (72) 99   





  63      


 


8/30/20 02:00    96/60    


 


8/30/20 01:30  69 26 118/68 (85) 99   





  69      


 


8/30/20 01:00  71 28 87/55 (66) 99   





  71      


 


8/30/20 01:00    107/89    


 


8/30/20 01:00  67 30 107/89 (95) 98   





  67      


 


8/30/20 00:38  73 30     100


 


8/30/20 00:30  76 29 124/65 (84) 99   





  76      


 


8/30/20 00:00  82 29 118/59 (78) 97   





  82      


 


8/30/20 00:00        100


 


8/30/20 00:00  82      


 


8/30/20 00:00    118/59    


 


8/30/20 00:00      Mechanical Ventilator  





      Mechanical Ventilator  


 


8/29/20 23:45 100.9       


 


8/29/20 23:34    128/80    


 


8/29/20 23:30  74 27 128/80 (96) 99   





  74      


 


8/29/20 23:00 100.9 71 28 112/85 (94) 99   





  71      


 


8/29/20 23:00    112/85    


 


8/29/20 22:45  91 30     100


 


8/29/20 22:30  75 28 103/63 (76) 99   





  75      


 


8/29/20 22:27  74  80/53    


 


8/29/20 22:00  77 28 101/59 (73) 98   





  77      


 


8/29/20 22:00    101/59    


 


8/29/20 21:47  67 20 143/108 (120) 98   





  67      


 


8/29/20 21:30  80 29 76/49 (58) 98   





  80      


 


8/29/20 21:00    105/54    


 


8/29/20 21:00  81 30 105/54 (71) 98   





  81      


 


8/29/20 20:34  81 30     100


 


8/29/20 20:30  78 30 83/62 (69) 98   





  78      


 


8/29/20 20:15 99.1 76 29 110/73 (85) 98   





  76      


 


8/29/20 20:00  76 29 113/62 (79) 98   





  76      


 


8/29/20 20:00    113/62    


 


8/29/20 20:00      Mechanical Ventilator  





      Mechanical Ventilator  


 


8/29/20 19:00  77 30 111/68 (82) 94   


 


8/29/20 19:00    111/68    


 


8/29/20 18:52  72 30  94 Mechanical Ventilator  100





  81 30     100


 


8/29/20 18:45  85 27 96/63 (74) 91   


 


8/29/20 18:30  79 30 110/67 (81) 92   


 


8/29/20 18:30        100


 


8/29/20 18:15  98 27 94/64 (74) 89   


 


8/29/20 18:00    115/72    


 


8/29/20 18:00  76 30 115/72 (86) 91   


 


8/29/20 17:45  78 30 121/78 (92) 94   


 


8/29/20 17:30  79 45 108/36 (60) 94   


 


8/29/20 17:15  77 27 91/51 (64) 97   


 


8/29/20 17:00  83 20 109/47 (67) 94   


 


8/29/20 17:00    109/47    


 


8/29/20 16:45  84 20 102/57 (72) 90   


 


8/29/20 16:30  82 20 103/43 (63) 93   


 


8/29/20 16:15  82 19 98/66 (77) 97   


 


8/29/20 16:00 98.9 68 20 83/54 (64) 97   


 


8/29/20 16:00        100


 


8/29/20 16:00  70      


 


8/29/20 16:00    83/54    


 


8/29/20 16:00      Mechanical Ventilator  





      Mechanical Ventilator  


 


8/29/20 15:59  68 20     70


 


8/29/20 15:45  68 20 91/51 (64) 97   


 


8/29/20 15:30  74 20 118/74 (89) 97   


 


8/29/20 15:15  70 20 111/45 (67) 97   


 


8/29/20 15:00    119/80    


 


8/29/20 15:00  65 20 119/80 (93) 97   


 


8/29/20 14:45  67 20 109/39 (62) 96   


 


8/29/20 14:30  67 20 103/53 (70) 95   


 


8/29/20 14:15  67 20 99/57 (71) 95   


 


8/29/20 14:00    96/56    


 


8/29/20 14:00  64 20 96/56 (69) 94   


 


8/29/20 13:59    126/29    


 


8/29/20 13:45  74 20 126/29 (61) 95   


 


8/29/20 13:30  73 21 124/78 (93) 96   


 


8/29/20 13:15  74 20 96/52 (67) 94   


 


8/29/20 13:00  83 20 94/68 (77) 97   


 


8/29/20 13:00    94/68    


 


8/29/20 12:45  76 20 154/112 (126) 100   


 


8/29/20 12:30  87 20 118/102 (107) 94   


 


8/29/20 12:15  112 45 102/84 (90) 86   


 


8/29/20 12:00 98.5 114 38 94/31 (52) 87   


 


8/29/20 12:00    94/31    


 


8/29/20 12:00        100


 


8/29/20 12:00      Mechanical Ventilator  





      Mechanical Ventilator  


 


8/29/20 11:45  79 25 111/48 (69) 98   


 


8/29/20 11:40  80      


 


8/29/20 11:30  76 20 92/47 (62) 93   


 


8/29/20 11:15  80 25 104/42 (62) 93   


 


8/29/20 11:00    99/44    


 


8/29/20 11:00  83 24 99/44 (62) 89   


 


8/29/20 10:45  86 24 92/43 (59) 91   


 


8/29/20 10:45  91 27     70


 


8/29/20 10:30  80 23 95/41 (59) 93   


 


8/29/20 10:15  79 22 100/41 (60) 94   








Status:  other - on vent


Objective:


Status:   sedated, on Vent -% PEEP5


Condition:  critical


HEENT:  atraumatic, normocephalic,  face with Down features, OP with ET in place

, intact


Lungs: scattered  rhonchi , isolated crackles at bases


Abdomen:  soft, non-tender, feeding tube


Decubiti:  +1 edema BLE


Micro:





Microbiology








 Date/Time


Source Procedure


Growth Status


 


 


 8/27/20 16:15


Blood Blood Culture - Preliminary


Staphylococcus Sp Coag Neg Resulted


 


 8/27/20 16:05


Blood Blood Culture - Preliminary


NO GROWTH AFTER 48 HOURS Resulted





 8/29/20 09:30


Abdomen Gram Stain - Final Resulted


 


 8/29/20 09:30


Abdomen Wound Culture


Pending Resulted








Accucheck:  131





Critical Care - Subjective


ROS Limited/Unobtainable:  Yes


Interval Events:


nataly settings titrated 8/29, since was desaturated, 


now on AC 30 with FiO2 100%


additional pressors -Lennox added since levo was maxed up


fevers last night, currently afebrile, leukocytosis with small trend down


CXR this am w/out much change,  


ABG  with mild met acidosis


persistent  CONS bacteremia


Condition:  critical


IV Access:  central - femoral CL intact


FI02:  100


Vent Support Breath Rate:  30


Vent Support Mode:  AC


Vent Tidal Volume:  500


Sputum Amount:  Scant


PEEP:  5.0


PIP:  46


Drips:  Levo 30 mcg/min, Lennox 60 mcg/min


Tube Feeding Amount:  0


I&O:











Intake and Output  


 


 8/29/20 8/30/20





 19:00 07:00


 


Intake Total 3756.13 ml 1747.205 ml


 


Output Total 1690 ml 550 ml


 


Balance 2066.13 ml 1197.205 ml


 


  


 


Free Water 20 ml 


 


IV Total 3696.13 ml 1667.205 ml


 


Tube Feeding 40 ml 80 ml


 


Output Urine Total 1690 ml 550 ml


 


# Bowel Movements 5 1








CXR:


CXR 8/30 -> 


1.slight interval worsening of 


severe diffuse alveolar traits throughout the right lung with improving 


moderate diffuse left lung infiltrate.


2.  There is been slight increase in mild bilateral pleural effusions.


ET-Tube:  7.5


ET Position:  22











Joyce Welch NP Aug 30, 2020 10:16

## 2020-08-30 NOTE — INTERNAL MED PROGRESS NOTE
Subjective


Date of Service:  Aug 30, 2020


Physician Name


Sanya Schultz


Attending Physician


Chano Cherry MD





Current Medications








 Medications


  (Trade)  Dose


 Ordered  Sig/Didi


 Route


 PRN Reason  Start Time


 Stop Time Status Last Admin


Dose Admin


 


 Acetaminophen


  (Tylenol)  650 mg  Q4H  PRN


 GT


 FEVER  8/26/20 11:45


 9/25/20 11:44  8/29/20 23:15


 


 


 Albuterol/


 Ipratropium


  (Albuterol/


 Ipratropium)  3 ml  Q4H  PRN


 HHN


 Shortness of Breath  8/29/20 11:30


 9/3/20 11:29   


 


 


 Albuterol/


 Ipratropium


  (Albuterol/


 Ipratropium)  3 ml  TIDRT


 HHN


   8/29/20 13:00


 9/3/20 12:59  8/30/20 15:53


 


 


 Chlorhexidine


 Gluconate


  (Beatrice-Hex 2%)  1 applic  DAILY@2000


 TOPIC


   8/26/20 20:00


 11/24/20 19:59  8/29/20 20:17


 


 


 Clotrimazole


  (Lotrimin)  1 applic  Q12HR


 TOPIC


   8/30/20 13:00


 11/28/20 12:59  8/30/20 13:03


 


 


 Daptomycin 350 mg/


 Sodium Chloride  55 ml @ 


 100 mls/hr  Q48H


 IV


   8/28/20 11:00


 9/1/20 10:59  8/30/20 11:36


 


 


 Dextrose


  (Dextrose 50%)  25 ml  Q30M  PRN


 IV


 Hypoglycemia  8/26/20 11:30


 11/20/20 11:29   


 


 


 Dextrose


  (Dextrose 50%)  50 ml  Q30M  PRN


 IV


 Hypoglycemia  8/26/20 11:30


 11/20/20 11:29   


 


 


 Hydrocortisone


  (Solu-CORTEF)  100 mg  EVERY 8  HOURS


 IV


   8/30/20 14:00


 11/28/20 13:59  8/30/20 13:03


 


 


 Levothyroxine


 Sodium


  (Synthroid)  75 mcg  DAILY


 GT


   8/27/20 09:00


 9/22/20 08:59  8/30/20 08:29


 


 


 Linezolid  300 ml @ 


 300 mls/hr  Q12H


 IVPB


   8/29/20 15:00


 9/5/20 14:59  8/30/20 14:01


 


 


 Meropenem 1 gm/


 Sodium Chloride  55 ml @ 


 110 mls/hr  Q12H


 IVPB


   8/26/20 16:00


 8/31/20 15:59  8/30/20 15:43


 


 


 Micafungin Sodium


 100 mg/Sodium


 Chloride  110 ml @ 


 110 mls/hr  Q24H


 IVPB


   8/27/20 14:00


 9/3/20 13:59  8/30/20 13:04


 


 


 Midodrine


  (Pro-Amatine)  10 mg  Q8HR


 GT


   8/28/20 14:00


 11/26/20 13:59  8/30/20 13:03


 


 


 Norepinephrine


 Bitartrate 16 mg/


 Dextrose  566 ml @ 0


 mls/hr  Q24H


 IV


   8/30/20 13:00


 9/29/20 12:59  8/30/20 13:04


 


 


 Ondansetron HCl


  (Zofran)  4 mg  Q6H  PRN


 IVP


 Nausea & Vomiting  8/26/20 12:00


 9/21/20 11:59   


 


 


 Pantoprazole


  (Protonix)  40 mg  DAILY


 IV


   8/27/20 09:00


 9/22/20 08:59  8/30/20 08:29


 


 


 Phenylephrine HCl


 50 mg/Dextrose  250 ml @ 0


 mls/hr  Q24H  PRN


 IV


 For hypotension  8/29/20 17:45


 9/28/20 17:44  8/30/20 14:00


 


 


 Polyethylene


 Glycol


  (Miralax)  17 gm  DAILYPRN  PRN


 GT


 Constipation  8/26/20 12:00


 9/21/20 11:59   


 


 


 Sodium Chloride  1,000 ml @ 


 100 mls/hr  Q10H


 IV


   8/26/20 23:00


 9/25/20 22:59  8/30/20 06:50


 


 


 Valproic Acid


  (Depakene)  500 mg  EVERY 8  HOURS


 GT


   8/26/20 14:00


 9/21/20 21:59  8/30/20 13:03


 








Allergies:  


Coded Allergies:  


     No Known Allergies (Unverified , 1/8/19)


ROS Limited/Unobtainable:  Yes


Subjective


58 YO F with Down's syndrome admitted with hypoxia.  Now sepsis and pneumonia.  

Cover for Int Phi-DR Hung.  ICU.  Intubated and sedated





Objective





Last Vital Signs








  Date Time  Temp Pulse Resp B/P (MAP) Pulse Ox O2 Delivery O2 Flow Rate FiO2


 


8/30/20 15:00  73 30 131/62 (85) 98   


 


8/30/20 13:06        100


 


8/30/20 12:00      Mechanical Ventilator  





      Mechanical Ventilator  


 


8/30/20 12:00 98.3       


 


8/27/20 00:00       15.0 











Laboratory Tests








Test


  8/29/20


19:30 8/30/20


05:49 8/30/20


08:10


 


Arterial Blood pH


  7.358


(7.350-7.450) 


  7.305


(7.350-7.450)


 


Arterial Blood Partial


Pressure CO2 33.2 mmHg


(35.0-45.0)  L 


  33.5 mmHg


(35.0-45.0)  L


 


Arterial Blood Partial


Pressure O2 60.4 mmHg


(75.0-100.0)  L 


  77.3 mmHg


(75.0-100.0)


 


Arterial Blood HCO3


  18.3 mmol/L


(22.0-26.0)  L 


  16.3 mmol/L


(22.0-26.0)  *L


 


Arterial Blood Oxygen


Saturation 92.6 %


()  L 


  95.9 %


()


 


Arterial Blood Base Excess -6.4 (-2-2)  L  -9.0 (-2-2)  L


 


Cole Test Positive    Positive  


 


White Blood Count


  


  17.0 K/UL


(4.8-10.8)  H 


 


 


Red Blood Count


  


  3.73 M/UL


(4.20-5.40)  L 


 


 


Hemoglobin


  


  12.8 G/DL


(12.0-16.0) 


 


 


Hematocrit


  


  39.5 %


(37.0-47.0) 


 


 


Mean Corpuscular Volume


  


  106 FL (80-99)


H 


 


 


Mean Corpuscular Hemoglobin


  


  34.2 PG


(27.0-31.0)  H 


 


 


Mean Corpuscular Hemoglobin


Concent 


  32.3 G/DL


(32.0-36.0) 


 


 


Red Cell Distribution Width


  


  13.6 %


(11.6-14.8) 


 


 


Platelet Count


  


  180 K/UL


(150-450) 


 


 


Mean Platelet Volume


  


  6.9 FL


(6.5-10.1) 


 


 


Neutrophils (%) (Auto)


  


  83.9 %


(45.0-75.0)  H 


 


 


Lymphocytes (%) (Auto)


  


  7.2 %


(20.0-45.0)  L 


 


 


Monocytes (%) (Auto)


  


  7.5 %


(1.0-10.0) 


 


 


Eosinophils (%) (Auto)


  


  0.6 %


(0.0-3.0) 


 


 


Basophils (%) (Auto)


  


  0.8 %


(0.0-2.0) 


 


 


Sodium Level


  


  135 MMOL/L


(136-145)  L 


 


 


Potassium Level


  


  3.7 MMOL/L


(3.5-5.1) 


 


 


Chloride Level


  


  106 MMOL/L


() 


 


 


Carbon Dioxide Level


  


  19 MMOL/L


(21-32)  L 


 


 


Anion Gap


  


  10 mmol/L


(5-15) 


 


 


Blood Urea Nitrogen


  


  14 mg/dL


(7-18) 


 


 


Creatinine


  


  1.9 MG/DL


(0.55-1.30)  H 


 


 


Estimat Glomerular Filtration


Rate 


  27.2 mL/min


(>60) 


 


 


Glucose Level


  


  122 MG/DL


()  H 


 


 


Calcium Level


  


  7.8 MG/DL


(8.5-10.1)  L 


 


 


Phosphorus Level


  


  2.7 MG/DL


(2.5-4.9) 


 


 


Magnesium Level


  


  1.8 MG/DL


(1.8-2.4) 


 


 


Total Bilirubin


  


  0.5 MG/DL


(0.2-1.0) 


 


 


Aspartate Amino Transf


(AST/SGOT) 


  32 U/L (15-37)


  


 


 


Alanine Aminotransferase


(ALT/SGPT) 


  8 U/L (12-78)


L 


 


 


Alkaline Phosphatase


  


  74 U/L


() 


 


 


Total Protein


  


  5.3 G/DL


(6.4-8.2)  L 


 


 


Albumin


  


  1.7 G/DL


(3.4-5.0)  L 


 


 


Globulin  3.6 g/dL   


 


Albumin/Globulin Ratio


  


  0.5 (1.0-2.7)


L 


 


 


Random Vancomycin Level  6.7 ug/mL   











Microbiology








 Date/Time


Source Procedure


Growth Status


 


 


 8/27/20 16:15


Blood Blood Culture - Preliminary


Staphylococcus Sp Coag Neg Resulted


 


 8/27/20 16:05


Blood Blood Culture - Preliminary


NO GROWTH AFTER 48 HOURS Resulted





 8/29/20 09:30


Abdomen Gram Stain - Final Resulted


 


 8/29/20 09:30


Abdomen Wound Culture


Pending Resulted

















Intake and Output  


 


 8/29/20 8/30/20





 19:00 07:00


 


Intake Total 3756.13 ml 1943.755 ml


 


Output Total 1690 ml 550 ml


 


Balance 2066.13 ml 1393.755 ml


 


  


 


Free Water 20 ml 


 


IV Total 3696.13 ml 1863.755 ml


 


Tube Feeding 40 ml 80 ml


 


Output Urine Total 1690 ml 550 ml


 


# Bowel Movements 5 1








Objective


General Appearance:  WD/WN, no apparent distress, alert


EENT:  PERRL/EOMI, normal ENT inspection


Neck:  non-tender, normal alignment, supple, normal inspection


Cardiovascular:  normal peripheral pulses, normal rate, regular rhythm, no 

gallop/murmur, no JVD


Respiratory/Chest:  [facility] vent; decreased breath sounds, crackles/rales, rhonchi 

- bilaterally, expiratory wheezing


Abdomen:  normal bowel sounds, non tender, soft, no organomegaly, no mass


Extremities:  normal range of motion


Neurologic:  CNs II-XII grossly normal


Skin:  normal pigmentation, warm/dry





Assessment/Plan


Problem List:  


(1) HCAP (healthcare-associated pneumonia)


Assessment & Plan:  Strep Group G.  Continue daptomycin per ID=Dr Gomes.  

Pulmonary/Critical care=DR Cherry.  COVID NEG





(2) Sepsis


Assessment & Plan:  Staph haemolyticus.  Continue daptomycin and ceftriaxone 

per ID=Dr Gomes





(3) Down's syndrome


(4) Dysphagia


Assessment & Plan:  S/P PEG





(5) Seizure disorder


Assessment & Plan:  Continue keppra and depakote





(6) Hypothyroidism


Assessment & Plan:  Continue synthroid





(7) Acute respiratory failure


Assessment & Plan:  Pulmonary = Dr Cherry; [facility] vent














Sanya Schultz MD Aug 30, 2020 16:04

## 2020-08-30 NOTE — NUR
NURSE NOTES:

VSS WITH LEVOPHED 30MCG/MIN AND PHENYLEPHRINE 100MCG/MIN AT THIS TIME, WILL CONTINUE TO 
MONITOR.

## 2020-08-30 NOTE — CONSULTATION
DATE OF CONSULTATION:  08/30/2020

CARDIOLOGY CONSULTATION



CONSULTING PHYSICIAN:  Delmy Parry MD.



REASON FOR CONSULT:  Hypotension and mild troponin elevation.



HISTORY OF PRESENT ILLNESS:  History is obtained from the chart as the

patient is intubated and unable to give any history.  The patient is a

59-year-old woman with Down syndrome and hypothyroidism, who was initially

admitted, transferred from a convalescent facility on 08/24/2020 with

shortness of breath.  She was found to have an oxygen saturation in the

80s and was admitted for treatment of pneumonia.  Her initial COVID test

was negative.  She suffered a respiratory arrest on the telemetry unit on

08/27/2020 and was intubated, and she is now on the ventilator and has had

progressive hypotension currently requiring two pressors, Levophed at 28

mcg and Lennox-Synephrine at 40 mcg.  She is noted to have bilateral diffuse

infiltrates on chest x-ray.  Troponin was 0.057.  EKG showed sinus rhythm

with right bundle-branch block.  She has no previous history of cardiac

disease and an echo done on admission showed normal left ventricular

function, EF 65%, and no significant valve lesions.



MEDICATIONS:  Hydrocortisone 100 mg IV every 8 hours, norepinephrine and

phenylephrine titrated to mean arterial pressure of greater than or equal

to 65, linezolid q.12 hours, DuoNeb three times a day as needed,

ProAmatine 10 mg every 8 hours per G-tube, daptomycin 350 mg every 48

hours, micafungin 100 mg every 24 hours, Synthroid 75 mcg daily per

G-tube, Protonix 40 mg daily intravenously, meropenem 1 g IV q.12 hours,

valproic acid 500 mg every 12 hours, Zofran as needed, and Tylenol as

needed.



ALLERGIES:  No known drug allergies.



PAST MEDICAL HISTORY:  As noted above.  History of Down's syndrome, history

of hypothyroidism, history of percutaneous gastrostomy tube placement.



REVIEW OF SYSTEMS:  Not obtainable from the patient or chart.



PHYSICAL EXAMINATION:

VITAL SIGNS:  Blood pressure is 131/62, pulse 73 and regular, respiratory

rate 30, and temperature 98.3.

GENERAL:  The patient is intubated and unresponsive on the ventilator.

HEENT:  Endotracheal tube in place.  Pupils are equal, round, and reactive

to light.

NECK:  Supple.  Jugular venous pressure is about 5 cm.

LUNGS:  Coarse breath sounds bilaterally.  Fair air movement.

HEART:  Regular S1 and S2 with distant heart sounds.  No murmurs or S3.

ABDOMEN:  Obese, soft, nontender.  No palpable mass.

EXTREMITIES:  There is 2+ pitting edema of the distal upper and lower

extremities bilaterally.



LABORATORY DATA:  Sodium 146, potassium 3.4, chloride 110, bicarbonate 22,

BUN 19, and creatinine 2.2.  Hemoglobin is 12.8, hematocrit 39, white

blood count 17,100, and platelets 180,000.  Cultures, blood culture from

08/27/2020, 1 positive for coag-negative Staph aureus.  Blood cultures

from 08/23/2020 and 1 from 08/27/2020 are positive for coag-negative

staph.  COVID screen was negative on 08/26/2020.



Chest x-ray shows bilateral diffuse infiltrates, possible right pleural

effusion.



EKG from 08/22/2020 showed normal sinus rhythm, 69 beats per minute,

left axis and right bundle-branch block.



ASSESSMENT AND RECOMMENDATIONS:  The patient is a 59-year-old woman with

Down syndrome and hypothyroidism, who presents with bilateral pneumonia,

ARDS, and septic shock.  She did have a mild troponin elevation of 0.057

on 08/27/2020, which has not been repeated and has a proBNP of 20,015.

However, in the setting of renal insufficiency this may not reflect

congestive heart failure.  Her elevated troponin is likely due to demand

ischemia in the setting of respiratory failure and hypotension, would

favor continuing on pressor support, titrating Levophed and Lennox-Synephrine

as needed to maintain MAP greater than 65.  We would consider ultrasound

of the right lung to determine if she might benefit from thoracentesis.

Her prognosis overall appears poor.  Dr. Jorgensen will continue to follow

her and make further recommendations based on her clinical course.









  ______________________________________________

  Delmy Parry M.D. DR:  RACHID

D:  08/30/2020 15:54

T:  08/30/2020 21:16

JOB#:  [id number]

CC:

## 2020-08-30 NOTE — NUR
NURSE NOTES:

AM care given

Soiled gowns, linens, and slider all changed.

Repositioned and oral care provided

Central dressing changed

Pt is Afebrile, and ST on cardiac monitor 

Intubated on 8/27 with 7.5 ETT noted 22 cm at the lip line. Settings: AC/VC30  FiO2 
100% Peep 5

G-Tube dressing changed. Jevity 1.2 at 20 cc/hr HOLD for unstable condition per AMRN. 

Valle noted draining pale, yellow urine. 

Skin alterations noted on pt's toe bilaterally.

Pt has a left femoral TLC, CDI 

Running NS at 100 cc/hr and Levophed at 30 mcg/min. 

Safe measures observed. Side rails up x 2 and padded per seizure precaution.

No acute distress noted. Will continue to monitor.

## 2020-08-30 NOTE — NUR
NURSE NOTES:

Bed bath given. Noted with crackle lung sounds and swollen neck area. Kept dry, clean and 
comfortable.

## 2020-08-31 VITALS — DIASTOLIC BLOOD PRESSURE: 65 MMHG | SYSTOLIC BLOOD PRESSURE: 95 MMHG

## 2020-08-31 VITALS — DIASTOLIC BLOOD PRESSURE: 88 MMHG | SYSTOLIC BLOOD PRESSURE: 104 MMHG

## 2020-08-31 VITALS — DIASTOLIC BLOOD PRESSURE: 54 MMHG | SYSTOLIC BLOOD PRESSURE: 115 MMHG

## 2020-08-31 VITALS — DIASTOLIC BLOOD PRESSURE: 106 MMHG | SYSTOLIC BLOOD PRESSURE: 123 MMHG

## 2020-08-31 VITALS — DIASTOLIC BLOOD PRESSURE: 75 MMHG | SYSTOLIC BLOOD PRESSURE: 106 MMHG

## 2020-08-31 VITALS — DIASTOLIC BLOOD PRESSURE: 79 MMHG | SYSTOLIC BLOOD PRESSURE: 114 MMHG

## 2020-08-31 VITALS — SYSTOLIC BLOOD PRESSURE: 157 MMHG | DIASTOLIC BLOOD PRESSURE: 71 MMHG

## 2020-08-31 VITALS — SYSTOLIC BLOOD PRESSURE: 102 MMHG | DIASTOLIC BLOOD PRESSURE: 59 MMHG

## 2020-08-31 VITALS — SYSTOLIC BLOOD PRESSURE: 87 MMHG | DIASTOLIC BLOOD PRESSURE: 65 MMHG

## 2020-08-31 VITALS — SYSTOLIC BLOOD PRESSURE: 148 MMHG | DIASTOLIC BLOOD PRESSURE: 61 MMHG

## 2020-08-31 VITALS — SYSTOLIC BLOOD PRESSURE: 145 MMHG | DIASTOLIC BLOOD PRESSURE: 86 MMHG

## 2020-08-31 VITALS — DIASTOLIC BLOOD PRESSURE: 62 MMHG | SYSTOLIC BLOOD PRESSURE: 133 MMHG

## 2020-08-31 VITALS — DIASTOLIC BLOOD PRESSURE: 76 MMHG | SYSTOLIC BLOOD PRESSURE: 130 MMHG

## 2020-08-31 VITALS — SYSTOLIC BLOOD PRESSURE: 98 MMHG | DIASTOLIC BLOOD PRESSURE: 84 MMHG

## 2020-08-31 VITALS — SYSTOLIC BLOOD PRESSURE: 108 MMHG | DIASTOLIC BLOOD PRESSURE: 50 MMHG

## 2020-08-31 VITALS — SYSTOLIC BLOOD PRESSURE: 112 MMHG | DIASTOLIC BLOOD PRESSURE: 62 MMHG

## 2020-08-31 VITALS — DIASTOLIC BLOOD PRESSURE: 62 MMHG | SYSTOLIC BLOOD PRESSURE: 105 MMHG

## 2020-08-31 VITALS — SYSTOLIC BLOOD PRESSURE: 129 MMHG | DIASTOLIC BLOOD PRESSURE: 85 MMHG

## 2020-08-31 VITALS — SYSTOLIC BLOOD PRESSURE: 138 MMHG | DIASTOLIC BLOOD PRESSURE: 93 MMHG

## 2020-08-31 VITALS — DIASTOLIC BLOOD PRESSURE: 71 MMHG | SYSTOLIC BLOOD PRESSURE: 150 MMHG

## 2020-08-31 VITALS — SYSTOLIC BLOOD PRESSURE: 113 MMHG | DIASTOLIC BLOOD PRESSURE: 60 MMHG

## 2020-08-31 VITALS — SYSTOLIC BLOOD PRESSURE: 115 MMHG | DIASTOLIC BLOOD PRESSURE: 49 MMHG

## 2020-08-31 VITALS — SYSTOLIC BLOOD PRESSURE: 132 MMHG | DIASTOLIC BLOOD PRESSURE: 57 MMHG

## 2020-08-31 VITALS — SYSTOLIC BLOOD PRESSURE: 103 MMHG | DIASTOLIC BLOOD PRESSURE: 53 MMHG

## 2020-08-31 VITALS — DIASTOLIC BLOOD PRESSURE: 76 MMHG | SYSTOLIC BLOOD PRESSURE: 108 MMHG

## 2020-08-31 VITALS — SYSTOLIC BLOOD PRESSURE: 110 MMHG | DIASTOLIC BLOOD PRESSURE: 87 MMHG

## 2020-08-31 VITALS — SYSTOLIC BLOOD PRESSURE: 119 MMHG | DIASTOLIC BLOOD PRESSURE: 61 MMHG

## 2020-08-31 VITALS — DIASTOLIC BLOOD PRESSURE: 60 MMHG | SYSTOLIC BLOOD PRESSURE: 102 MMHG

## 2020-08-31 VITALS — DIASTOLIC BLOOD PRESSURE: 60 MMHG | SYSTOLIC BLOOD PRESSURE: 115 MMHG

## 2020-08-31 VITALS — DIASTOLIC BLOOD PRESSURE: 63 MMHG | SYSTOLIC BLOOD PRESSURE: 143 MMHG

## 2020-08-31 VITALS — SYSTOLIC BLOOD PRESSURE: 146 MMHG | DIASTOLIC BLOOD PRESSURE: 63 MMHG

## 2020-08-31 VITALS — SYSTOLIC BLOOD PRESSURE: 104 MMHG | DIASTOLIC BLOOD PRESSURE: 19 MMHG

## 2020-08-31 VITALS — SYSTOLIC BLOOD PRESSURE: 105 MMHG | DIASTOLIC BLOOD PRESSURE: 57 MMHG

## 2020-08-31 VITALS — SYSTOLIC BLOOD PRESSURE: 104 MMHG | DIASTOLIC BLOOD PRESSURE: 68 MMHG

## 2020-08-31 VITALS — SYSTOLIC BLOOD PRESSURE: 108 MMHG | DIASTOLIC BLOOD PRESSURE: 57 MMHG

## 2020-08-31 VITALS — SYSTOLIC BLOOD PRESSURE: 111 MMHG | DIASTOLIC BLOOD PRESSURE: 74 MMHG

## 2020-08-31 VITALS — SYSTOLIC BLOOD PRESSURE: 110 MMHG | DIASTOLIC BLOOD PRESSURE: 55 MMHG

## 2020-08-31 VITALS — SYSTOLIC BLOOD PRESSURE: 119 MMHG | DIASTOLIC BLOOD PRESSURE: 70 MMHG

## 2020-08-31 VITALS — SYSTOLIC BLOOD PRESSURE: 133 MMHG | DIASTOLIC BLOOD PRESSURE: 57 MMHG

## 2020-08-31 VITALS — SYSTOLIC BLOOD PRESSURE: 115 MMHG | DIASTOLIC BLOOD PRESSURE: 75 MMHG

## 2020-08-31 VITALS — DIASTOLIC BLOOD PRESSURE: 48 MMHG | SYSTOLIC BLOOD PRESSURE: 87 MMHG

## 2020-08-31 VITALS — SYSTOLIC BLOOD PRESSURE: 113 MMHG | DIASTOLIC BLOOD PRESSURE: 96 MMHG

## 2020-08-31 VITALS — DIASTOLIC BLOOD PRESSURE: 108 MMHG | SYSTOLIC BLOOD PRESSURE: 149 MMHG

## 2020-08-31 VITALS — DIASTOLIC BLOOD PRESSURE: 66 MMHG | SYSTOLIC BLOOD PRESSURE: 107 MMHG

## 2020-08-31 VITALS — SYSTOLIC BLOOD PRESSURE: 144 MMHG | DIASTOLIC BLOOD PRESSURE: 86 MMHG

## 2020-08-31 VITALS — SYSTOLIC BLOOD PRESSURE: 103 MMHG | DIASTOLIC BLOOD PRESSURE: 48 MMHG

## 2020-08-31 VITALS — DIASTOLIC BLOOD PRESSURE: 78 MMHG | SYSTOLIC BLOOD PRESSURE: 134 MMHG

## 2020-08-31 VITALS — DIASTOLIC BLOOD PRESSURE: 60 MMHG | SYSTOLIC BLOOD PRESSURE: 130 MMHG

## 2020-08-31 VITALS — SYSTOLIC BLOOD PRESSURE: 136 MMHG | DIASTOLIC BLOOD PRESSURE: 77 MMHG

## 2020-08-31 VITALS — DIASTOLIC BLOOD PRESSURE: 60 MMHG | SYSTOLIC BLOOD PRESSURE: 117 MMHG

## 2020-08-31 VITALS — DIASTOLIC BLOOD PRESSURE: 60 MMHG | SYSTOLIC BLOOD PRESSURE: 123 MMHG

## 2020-08-31 VITALS — SYSTOLIC BLOOD PRESSURE: 103 MMHG | DIASTOLIC BLOOD PRESSURE: 90 MMHG

## 2020-08-31 VITALS — SYSTOLIC BLOOD PRESSURE: 124 MMHG | DIASTOLIC BLOOD PRESSURE: 52 MMHG

## 2020-08-31 VITALS — SYSTOLIC BLOOD PRESSURE: 110 MMHG | DIASTOLIC BLOOD PRESSURE: 67 MMHG

## 2020-08-31 VITALS — SYSTOLIC BLOOD PRESSURE: 107 MMHG | DIASTOLIC BLOOD PRESSURE: 69 MMHG

## 2020-08-31 VITALS — DIASTOLIC BLOOD PRESSURE: 57 MMHG | SYSTOLIC BLOOD PRESSURE: 113 MMHG

## 2020-08-31 VITALS — SYSTOLIC BLOOD PRESSURE: 128 MMHG | DIASTOLIC BLOOD PRESSURE: 62 MMHG

## 2020-08-31 VITALS — SYSTOLIC BLOOD PRESSURE: 125 MMHG | DIASTOLIC BLOOD PRESSURE: 82 MMHG

## 2020-08-31 VITALS — DIASTOLIC BLOOD PRESSURE: 63 MMHG | SYSTOLIC BLOOD PRESSURE: 122 MMHG

## 2020-08-31 VITALS — SYSTOLIC BLOOD PRESSURE: 112 MMHG | DIASTOLIC BLOOD PRESSURE: 53 MMHG

## 2020-08-31 VITALS — SYSTOLIC BLOOD PRESSURE: 109 MMHG | DIASTOLIC BLOOD PRESSURE: 45 MMHG

## 2020-08-31 VITALS — DIASTOLIC BLOOD PRESSURE: 58 MMHG | SYSTOLIC BLOOD PRESSURE: 93 MMHG

## 2020-08-31 VITALS — SYSTOLIC BLOOD PRESSURE: 149 MMHG | DIASTOLIC BLOOD PRESSURE: 49 MMHG

## 2020-08-31 VITALS — SYSTOLIC BLOOD PRESSURE: 119 MMHG | DIASTOLIC BLOOD PRESSURE: 62 MMHG

## 2020-08-31 VITALS — SYSTOLIC BLOOD PRESSURE: 113 MMHG | DIASTOLIC BLOOD PRESSURE: 97 MMHG

## 2020-08-31 VITALS — SYSTOLIC BLOOD PRESSURE: 110 MMHG | DIASTOLIC BLOOD PRESSURE: 65 MMHG

## 2020-08-31 VITALS — SYSTOLIC BLOOD PRESSURE: 111 MMHG | DIASTOLIC BLOOD PRESSURE: 64 MMHG

## 2020-08-31 VITALS — SYSTOLIC BLOOD PRESSURE: 105 MMHG | DIASTOLIC BLOOD PRESSURE: 85 MMHG

## 2020-08-31 VITALS — SYSTOLIC BLOOD PRESSURE: 106 MMHG | DIASTOLIC BLOOD PRESSURE: 60 MMHG

## 2020-08-31 VITALS — SYSTOLIC BLOOD PRESSURE: 149 MMHG | DIASTOLIC BLOOD PRESSURE: 69 MMHG

## 2020-08-31 VITALS — DIASTOLIC BLOOD PRESSURE: 89 MMHG | SYSTOLIC BLOOD PRESSURE: 123 MMHG

## 2020-08-31 VITALS — SYSTOLIC BLOOD PRESSURE: 159 MMHG | DIASTOLIC BLOOD PRESSURE: 96 MMHG

## 2020-08-31 VITALS — DIASTOLIC BLOOD PRESSURE: 59 MMHG | SYSTOLIC BLOOD PRESSURE: 116 MMHG

## 2020-08-31 VITALS — DIASTOLIC BLOOD PRESSURE: 103 MMHG | SYSTOLIC BLOOD PRESSURE: 119 MMHG

## 2020-08-31 LAB
ADD MANUAL DIFF: YES
ANION GAP SERPL CALC-SCNC: 12 MMOL/L (ref 5–15)
BUN SERPL-MCNC: 17 MG/DL (ref 7–18)
CALCIUM SERPL-MCNC: 8.5 MG/DL (ref 8.5–10.1)
CHLORIDE SERPL-SCNC: 104 MMOL/L (ref 98–107)
CO2 SERPL-SCNC: 18 MMOL/L (ref 21–32)
CREAT SERPL-MCNC: 1.9 MG/DL (ref 0.55–1.3)
ERYTHROCYTE [DISTWIDTH] IN BLOOD BY AUTOMATED COUNT: 13.6 % (ref 11.6–14.8)
HCT VFR BLD CALC: 37.9 % (ref 37–47)
HGB BLD-MCNC: 12.3 G/DL (ref 12–16)
MCV RBC AUTO: 105 FL (ref 80–99)
PLATELET # BLD: 134 K/UL (ref 150–450)
POTASSIUM SERPL-SCNC: 4.4 MMOL/L (ref 3.5–5.1)
RBC # BLD AUTO: 3.62 M/UL (ref 4.2–5.4)
SODIUM SERPL-SCNC: 134 MMOL/L (ref 136–145)
WBC # BLD AUTO: 16.4 K/UL (ref 4.8–10.8)

## 2020-08-31 PROCEDURE — 02HV33Z INSERTION OF INFUSION DEVICE INTO SUPERIOR VENA CAVA, PERCUTANEOUS APPROACH: ICD-10-PCS

## 2020-08-31 PROCEDURE — B548ZZA ULTRASONOGRAPHY OF SUPERIOR VENA CAVA, GUIDANCE: ICD-10-PCS

## 2020-08-31 RX ADMIN — HYDROCORTISONE SODIUM SUCCINATE SCH MG: 100 INJECTION, POWDER, FOR SOLUTION INTRAMUSCULAR; INTRAVENOUS at 15:06

## 2020-08-31 RX ADMIN — IPRATROPIUM BROMIDE AND ALBUTEROL SULFATE SCH ML: .5; 3 SOLUTION RESPIRATORY (INHALATION) at 20:22

## 2020-08-31 RX ADMIN — MEROPENEM SCH MLS/HR: 1 INJECTION INTRAVENOUS at 03:51

## 2020-08-31 RX ADMIN — MIDODRINE HYDROCHLORIDE SCH MG: 10 TABLET ORAL at 22:44

## 2020-08-31 RX ADMIN — HYDROCORTISONE SODIUM SUCCINATE SCH MG: 100 INJECTION, POWDER, FOR SOLUTION INTRAMUSCULAR; INTRAVENOUS at 05:38

## 2020-08-31 RX ADMIN — DEXTROSE MONOHYDRATE SCH MLS/HR: 50 INJECTION, SOLUTION INTRAVENOUS at 15:06

## 2020-08-31 RX ADMIN — IPRATROPIUM BROMIDE AND ALBUTEROL SULFATE SCH ML: .5; 3 SOLUTION RESPIRATORY (INHALATION) at 12:24

## 2020-08-31 RX ADMIN — MEROPENEM SCH MLS/HR: 1 INJECTION INTRAVENOUS at 17:27

## 2020-08-31 RX ADMIN — SODIUM CHLORIDE SCH MLS/HR: 900 INJECTION, SOLUTION INTRAVENOUS at 07:49

## 2020-08-31 RX ADMIN — PHENYLEPHRINE HYDROCHLORIDE PRN MLS/HR: 10 INJECTION INTRAVENOUS at 01:49

## 2020-08-31 RX ADMIN — VALPROIC ACID SCH MG: 250 SOLUTION ORAL at 22:44

## 2020-08-31 RX ADMIN — SODIUM CHLORIDE SCH MLS/HR: 900 INJECTION, SOLUTION INTRAVENOUS at 08:10

## 2020-08-31 RX ADMIN — VALPROIC ACID SCH MG: 250 SOLUTION ORAL at 15:06

## 2020-08-31 RX ADMIN — PANTOPRAZOLE SODIUM SCH MG: 40 INJECTION, POWDER, FOR SOLUTION INTRAVENOUS at 07:49

## 2020-08-31 RX ADMIN — IPRATROPIUM BROMIDE AND ALBUTEROL SULFATE SCH ML: .5; 3 SOLUTION RESPIRATORY (INHALATION) at 07:04

## 2020-08-31 RX ADMIN — CHLORHEXIDINE GLUCONATE SCH APPLIC: 213 SOLUTION TOPICAL at 19:33

## 2020-08-31 RX ADMIN — MIDODRINE HYDROCHLORIDE SCH MG: 10 TABLET ORAL at 15:06

## 2020-08-31 RX ADMIN — HYDROCORTISONE SODIUM SUCCINATE SCH MG: 100 INJECTION, POWDER, FOR SOLUTION INTRAMUSCULAR; INTRAVENOUS at 22:43

## 2020-08-31 RX ADMIN — MIDODRINE HYDROCHLORIDE SCH MG: 10 TABLET ORAL at 05:38

## 2020-08-31 RX ADMIN — VALPROIC ACID SCH MG: 250 SOLUTION ORAL at 05:39

## 2020-08-31 NOTE — CONSULTATION
DATE OF CONSULTATION:  08/30/2020

CONSULTING PHYSICIAN:  Elfego Gan DPM



REFERRING PHYSICIAN:  Chano Cherry MD



REASON FOR CONSULTATION:  Laceration to the right foot.



HISTORY OF PRESENT ILLNESS:  This is a 59-year-old patient with a history

of Down syndrome, seen in the ICU at [facility] for cellulitis

and ulceration to the right foot.  Patient is bedbound and.



PAST MEDICAL HISTORY:  Per PCP, Dr. Cherry.



PODIATRY EXAMINATION:

VASCULAR:  Dorsalis pedis is palpable 1/4 bilaterally, noted to be swollen.

Capillary filling time is less than 5 seconds.

NEUROLOGICAL:  Could not be assessed due to patient's current condition.

MUSCULOSKELETAL:  Full evaluation could not be done due to patient's

bed-bound status.  Patient is contracted.  She was noted to be contracted

as well at the MPJ and PIPJ consistent with hammertoe deformity and bunion

deformity bilaterally.

DERMATOLOGICAL:  Attention was directed to the right hallux where a

discoloration, minimal necrotic tissue is noted at the tip of the toe,

stable.  No sign of cellulitis, drainage, or discharge.  No purulent

matter.  No probing or undermining was identified.



The plantar foot skin was identified of scaly red patches with wound

clean and erythema was identified consistent with fungal infection.



ASSESSMENT AND PLAN:  Bed-bound patient with history of Down syndrome

identified with fungal infection to the bilateral feet.  Order was written

for clotrimazole cream to be applied to feet bilaterally b.i.d. x21

days.



Order was written for application of Betadine to the tip of the right

second toe daily as needed.  No further treatment needed at this time.









  ______________________________________________

  Elfego Gan D.P.M





DR:  SULMA

D:  08/30/2020 23:31

T:  08/31/2020 02:26

JOB#:  [id number]

CC:



KIN

## 2020-08-31 NOTE — DIAGNOSTIC IMAGING REPORT
Indication: Tortuous of breath

 

Technique: One view of the chest

 

Comparison: 8/30/2020

 

Findings: Extensive bilateral interstitial and airspace disease appears similar to

the prior exam. Moderate to large bilateral pleural effusions appear unchanged.

Stable satisfactory position of endotracheal tube. The heart size is difficult to

assess, probably enlarged

 

Impression:  Unchanged, over one day, findings as above.

## 2020-08-31 NOTE — INFECTIOUS DISEASES PROG NOTE
Assessment/Plan








ASSESSMENT:


sp code blue 8/26





Septic Shock


Fever


Leukocytosis; improving


  -8/26 u/a no pyuria





Pneumonia.- COVID 19 neg x3


   Acute hypoxic resp failure on VM> NRB 15l 100%; hypoxic on ABG> now VDRF 8/26

- Fio2 80% >100% 8/28 8/28 CXR: Increasing left upper lobe dense consolidation and likely 

increasing bilateral pleural fluid. Persistent diffuse dense consolidation 

elsewhere


            -8/26 sp cx normal resp lilliam


             -8/25 CXR: Increased atelectasis of the right lung, since prior 

exam of 3 days earlier. New or increased right pleural effusion. Increased left 

basilar consolidation and/or pleural fluid 


          -COVID Rapid PCR neg 8/23, 8/23, 8/26


          -8/22 spc x Group G strep


          -8/22 CXR:   Reduced lung volumes.  Patchy bilateral predominantly 

interstitial  pulmonary opacities.  Could be from edema and/or pneumonia.  

There is a broader differential.





Persistent, high grade bacteremia-  r/o endocarditis


     -8/22 Bcx 4/4 sets S. haemolyticus; 8/23 Bcx 3/4 S/ epi; 8/27 Bcx 1.4 S. 

warnerri; 8/29 Bcx NTD


     -2d echo: no vegetaions seen





   R/o probable UTI


          ua/ wbc 10-15, nit neg, leuk +1; ucx Neg





DAVID; worsening


 -supratherapeutic vanco levels





-Seizure disorder.


- Hypothyroidism.


- Down syndrome.





History of PEG tube placement.


[facility]





PLAN:


Cont linezolid #3 given persistent GPC bacteremia (double coverage, though not 

ideal for bacteremia) and pneumonia (dapto doesn't cover for pneumonia)


  -monitor plts





Cont Daptomycin #7(abx d #10) for GPC bacteremia in view of DAVID [monitor CK, 

was 51 on 8/26]


Cont Meropenem#6 (abx d #10) given resp decompensation


Cont Micafungin #5





f/u Repeat BCx given persistent bacteremia


CT chest when stable enough, not currently given on FiO2 100% and pressors


If pleural effusions, should be tapped to r/o empyema, send for bacterial cx as 

well as cell count and diff


F/u cx of exudate around G-tube


Trend GIB (black stools)





   8/28 SP Azithromycin #7/7


   8/26 SP Ceftriaxone #2


   8/25 SP IV Vancomycin #4, Zosyn #4


   8/22 SP Cefepime x1, Flagyl x1





- Monitor CBC, BMP.


-f/u Cocci ab, CrAg


.f/u  Repeat cx


- COVID neg x3; will continue isolation for now given ongoing hypoxia and fever


- Monitor chest x-ray.


- Monitor the patient's clinical course and labs.  Based on those, we will do 

further recommendation.





Thank you, Dr. Cherry, for allowing me to participate in the care of


this patient.  I will follow the patient with you at this


hospitalization.








Discussed with RN





Subjective


Allergies:  


Coded Allergies:  


     No Known Allergies (Unverified , 1/8/19)


afebrile >24hrs


remains intubated, Fio2 100%


on levo at 28 and felix at 60





Objective





Last 24 Hour Vital Signs








  Date Time  Temp Pulse Resp B/P (MAP) Pulse Ox O2 Delivery O2 Flow Rate FiO2


 


8/31/20 09:31  63 30     100


 


8/31/20 09:30  71 30 114/79 (91) 100   


 


8/31/20 09:00  74 30 108/76 (87) 100   


 


8/31/20 08:30  70 30 133/62 (85) 100   


 


8/31/20 08:10    150/64    


 


8/31/20 08:00        100


 


8/31/20 08:00  73      


 


8/31/20 08:00 97.8 72 30 146/63 (90) 100   


 


8/31/20 08:00      Mechanical Ventilator  





      Mechanical Ventilator  


 


8/31/20 07:30  76 30 143/63 (89) 99   


 


8/31/20 07:04  95 30     100


 


8/31/20 07:00  77 30 148/61 (90) 99   


 


8/31/20 06:45  77 30 134/78 (96) 99   


 


8/31/20 06:30  82 30 130/60 (83) 100   


 


8/31/20 06:30    130/60    


 


8/31/20 06:20  81 30 149/49 (82) 100   


 


8/31/20 06:15  82 30 149/108 (122) 100   


 


8/31/20 06:15    149/108    


 


8/31/20 06:00    136/77    


 


8/31/20 06:00  97 31 136/77 (96) 97   


 


8/31/20 05:34  107 32     100


 


8/31/20 05:30  96 29 115/75 (88) 92   


 


8/31/20 05:00  82 30 159/96 (117) 98   


 


8/31/20 05:00    159/96    


 


8/31/20 04:30  85 30 138/93 (108) 95   


 


8/31/20 04:00        100


 


8/31/20 04:00 98.9 82 29 113/96 (102) 98   


 


8/31/20 04:00  75      


 


8/31/20 04:00    113/96    


 


8/31/20 04:00      Mechanical Ventilator  





      Mechanical Ventilator  


 


8/31/20 03:45  78 30 130/76 (94) 100   


 


8/31/20 03:30  73 30     100


 


8/31/20 03:30  73 30 149/69 (95) 100   


 


8/31/20 03:15  78 30 132/57 (82) 100   


 


8/31/20 03:04  89 27 119/103 (108) 99   


 


8/31/20 03:00    123/106    


 


8/31/20 03:00  88 29 123/106 (112) 96   


 


8/31/20 02:45  88 23 110/87 (95) 93   


 


8/31/20 02:30  68 30 124/52 (76) 99   


 


8/31/20 02:15  81 29 106/75 (85) 100   


 


8/31/20 02:00  78 30 107/66 (80) 100   


 


8/31/20 02:00    107/66    


 


8/31/20 01:49  84  129/85    


 


8/31/20 01:45  82 30 103/90 (94) 99   


 


8/31/20 01:30  90 27 129/85 (100) 97   


 


8/31/20 01:15  82 30 115/54 (74) 100   


 


8/31/20 01:00    117/60    


 


8/31/20 01:00  79 30 117/60 (79) 99   


 


8/31/20 00:50  79 30     100


 


8/31/20 00:45  87 30 98/84 (89) 97   


 


8/31/20 00:30  83 29 113/97 (102) 100   


 


8/31/20 00:15  79 30 104/88 (93) 100   


 


8/31/20 00:00      Mechanical Ventilator  





      Mechanical Ventilator  


 


8/31/20 00:00  87      


 


8/31/20 00:00    105/85    


 


8/31/20 00:00 99.0 87 28 105/85 (92) 96   


 


8/31/20 00:00        100


 


8/30/20 23:45  87 30 112/94 (100) 100   


 


8/30/20 23:30  88 29 109/70 (83) 98   


 


8/30/20 23:15  94 26 97/51 (66) 97   


 


8/30/20 23:00  83 29 109/69 (82) 99   


 


8/30/20 23:00    109/69    


 


8/30/20 22:55  83 30     100


 


8/30/20 22:47    106/57    


 


8/30/20 22:45  81 30 106/57 (73) 99   


 


8/30/20 22:30  85 30 91/67 (75) 99   


 


8/30/20 22:15  89 28 91/66 (74) 94   


 


8/30/20 22:00    103/89    


 


8/30/20 22:00  85 26 103/89 (94) 99   


 


8/30/20 21:45  80 30 83/65 (71) 100   


 


8/30/20 21:30  79 30 107/44 (65) 99   


 


8/30/20 21:21  83 30 99/60 (73) 100   


 


8/30/20 21:15  83 30 73/38 (50) 99   


 


8/30/20 21:15    73/38    


 


8/30/20 21:10  87 30 97/53 (68) 93   


 


8/30/20 21:00    88/63    


 


8/30/20 21:00  94 26 88/63 (71) 94   


 


8/30/20 20:45  86 28 88/66 (73) 100   


 


8/30/20 20:36  80 30     100


 


8/30/20 20:30  84 30 93/51 (65) 100   


 


8/30/20 20:15  80 30 102/59 (73) 100   


 


8/30/20 20:00      Mechanical Ventilator  





      Mechanical Ventilator  


 


8/30/20 20:00    106/71    


 


8/30/20 20:00        100


 


8/30/20 20:00 98.5 82 30 106/71 (83) 100   


 


8/30/20 19:45  82 30 93/69 (77) 99   


 


8/30/20 19:30  85 28 117/91 (100) 100   


 


8/30/20 19:20  81      


 


8/30/20 19:07  80 30  100 Mechanical Ventilator  100





  84 30     100


 


8/30/20 19:00    101/53    


 


8/30/20 19:00  80 30 98/65 (76) 100   


 


8/30/20 18:45    98/65    


 


8/30/20 18:30  84 30 93/60 (71) 100   


 


8/30/20 18:30    103/65    


 


8/30/20 18:15    93/60    


 


8/30/20 18:00  91 30 90/54 (66) 98   


 


8/30/20 18:00    90/54    


 


8/30/20 17:30 98.6 99 26 117/96 (103) 93   


 


8/30/20 17:18  89 30     100


 


8/30/20 17:00    93/73    


 


8/30/20 17:00  88 30 91/29 (49) 95   


 


8/30/20 16:30  87 30 100/69 (79) 96   


 


8/30/20 16:00      Mechanical Ventilator  





      Mechanical Ventilator  


 


8/30/20 16:00        100


 


8/30/20 16:00  97 30 123/39 (67) 97   


 


8/30/20 16:00    123/39    


 


8/30/20 16:00  88      


 


8/30/20 15:30  77 30 125/48 (73) 99   


 


8/30/20 15:29  69 30  98 Mechanical Ventilator  100





  78 30     100


 


8/30/20 15:00    131/62    


 


8/30/20 15:00  73 30 131/62 (85) 98   


 


8/30/20 14:45    130/50    


 


8/30/20 14:30  73 30 131/32 (65) 98   


 


8/30/20 14:30    131/32    


 


8/30/20 14:15    113/64    


 


8/30/20 14:00  73 30 113/64 (80) 98   


 


8/30/20 14:00    112/86    


 


8/30/20 14:00  75  110/61    


 


8/30/20 13:30  75 30 67/14 (31) 98   


 


8/30/20 13:06  71 30     100


 


8/30/20 13:04    126/62    


 


8/30/20 13:00    126/62    


 


8/30/20 13:00  72 30 126/62 (83) 98   


 


8/30/20 12:30  72 30 138/62 (87) 98   


 


8/30/20 12:00        100


 


8/30/20 12:00      Mechanical Ventilator  





      Mechanical Ventilator  


 


8/30/20 12:00 98.3 72 30 110/45 (66) 98   


 


8/30/20 12:00    96/68    


 


8/30/20 12:00  69      


 


8/30/20 11:30  74 30     100


 


8/30/20 11:28  81 30 113/92 (99) 99   








Height (Feet):  5


Height (Inches):  3.00


Weight (Pounds):  177


HEENT:  No pale conjunctivae.  No icterus. ETT in place


NECK:  No lymphadenopathy.


CHEST:  Coarse breathing sounds.


HEART:  S1 and S2.


ABDOMEN:  Soft.  PEG tube in place.


EXTREMITIES:  No cyanosis at this time .





Microbiology








 Date/Time


Source Procedure


Growth Status


 


 


 8/29/20 11:50


Blood Blood Culture - Preliminary


NO GROWTH AFTER 24 HOURS Resulted


 


 8/29/20 11:40


Blood Blood Culture - Preliminary


NO GROWTH AFTER 24 HOURS Resulted





 8/29/20 09:30


Abdomen Gram Stain - Final Resulted


 


 8/29/20 09:30 Wound Culture - Preliminary


Yeast Species Resulted











Laboratory Tests








Test


  8/31/20


06:09 8/31/20


07:04


 


White Blood Count


  16.4 K/UL


(4.8-10.8)  H 


 


 


Red Blood Count


  3.62 M/UL


(4.20-5.40)  L 


 


 


Hemoglobin


  12.3 G/DL


(12.0-16.0) 


 


 


Hematocrit


  37.9 %


(37.0-47.0) 


 


 


Mean Corpuscular Volume


  105 FL (80-99)


H 


 


 


Mean Corpuscular Hemoglobin


  34.1 PG


(27.0-31.0)  H 


 


 


Mean Corpuscular Hemoglobin


Concent 32.5 G/DL


(32.0-36.0) 


 


 


Red Cell Distribution Width


  13.6 %


(11.6-14.8) 


 


 


Platelet Count


  134 K/UL


(150-450)  L 


 


 


Mean Platelet Volume


  7.2 FL


(6.5-10.1) 


 


 


Neutrophils (%) (Auto)


  % (45.0-75.0)


  


 


 


Lymphocytes (%) (Auto)


  % (20.0-45.0)


  


 


 


Monocytes (%) (Auto)  % (1.0-10.0)   


 


Eosinophils (%) (Auto)  % (0.0-3.0)   


 


Basophils (%) (Auto)  % (0.0-2.0)   


 


Differential Total Cells


Counted 100  


  


 


 


Neutrophils % (Manual) 85 % (45-75)  H 


 


Lymphocytes % (Manual) 10 % (20-45)  L 


 


Monocytes % (Manual) 5 % (1-10)   


 


Eosinophils % (Manual) 0 % (0-3)   


 


Basophils % (Manual) 0 % (0-2)   


 


Band Neutrophils 0 % (0-8)   


 


Platelet Estimate Decreased  L 


 


Platelet Morphology Normal   


 


Anisocytosis 1+   


 


Macrocytosis 1+   


 


Sodium Level


  134 MMOL/L


(136-145)  L 


 


 


Potassium Level


  4.4 MMOL/L


(3.5-5.1) 


 


 


Chloride Level


  104 MMOL/L


() 


 


 


Carbon Dioxide Level


  18 MMOL/L


(21-32)  L 


 


 


Anion Gap


  12 mmol/L


(5-15) 


 


 


Blood Urea Nitrogen


  17 mg/dL


(7-18) 


 


 


Creatinine


  1.9 MG/DL


(0.55-1.30)  H 


 


 


Estimat Glomerular Filtration


Rate 27.2 mL/min


(>60) 


 


 


Glucose Level


  142 MG/DL


()  H 


 


 


Calcium Level


  8.5 MG/DL


(8.5-10.1) 


 


 


Arterial Blood pH


  


  7.327


(7.350-7.450)


 


Arterial Blood Partial


Pressure CO2 


  27.7 mmHg


(35.0-45.0)  L


 


Arterial Blood Partial


Pressure O2 


  95.6 mmHg


(75.0-100.0)


 


Arterial Blood HCO3


  


  14.2 mmol/L


(22.0-26.0)  *L


 


Arterial Blood Oxygen


Saturation 


  97.5 %


()


 


Arterial Blood Base Excess


  


  -10.3 (-2-2)


*L


 


Cole Test  Positive  











Current Medications








 Medications


  (Trade)  Dose


 Ordered  Sig/Didi


 Route


 PRN Reason  Start Time


 Stop Time Status Last Admin


Dose Admin


 


 Acetaminophen


  (Tylenol)  650 mg  Q4H  PRN


 GT


 FEVER  8/26/20 11:45


 9/25/20 11:44  8/29/20 23:15


 


 


 Albuterol/


 Ipratropium


  (Albuterol/


 Ipratropium)  3 ml  Q4H  PRN


 HHN


 Shortness of Breath  8/29/20 11:30


 9/3/20 11:29   


 


 


 Albuterol/


 Ipratropium


  (Albuterol/


 Ipratropium)  3 ml  TIDRT


 HHN


   8/29/20 13:00


 9/3/20 12:59  8/31/20 07:04


 


 


 Chlorhexidine


 Gluconate


  (Beatrice-Hex 2%)  1 applic  DAILY@2000


 TOPIC


   8/26/20 20:00


 11/24/20 19:59  8/30/20 19:31


 


 


 Clotrimazole


  (Lotrimin)  1 applic  Q12HR


 TOPIC


   8/30/20 13:00


 11/28/20 12:59  8/31/20 07:50


 


 


 Daptomycin 350 mg/


 Sodium Chloride  55 ml @ 


 100 mls/hr  Q48H


 IV


   8/28/20 11:00


 9/5/20 10:59  8/30/20 11:36


 


 


 Dextrose


  (Dextrose 50%)  25 ml  Q30M  PRN


 IV


 Hypoglycemia  8/26/20 11:30


 11/20/20 11:29   


 


 


 Dextrose


  (Dextrose 50%)  50 ml  Q30M  PRN


 IV


 Hypoglycemia  8/26/20 11:30


 11/20/20 11:29   


 


 


 Hydrocortisone


  (Solu-CORTEF)  100 mg  EVERY 8  HOURS


 IV


   8/30/20 14:00


 11/28/20 13:59  8/31/20 05:38


 


 


 Levothyroxine


 Sodium


  (Synthroid)  75 mcg  DAILY


 GT


   8/27/20 09:00


 9/22/20 08:59  8/31/20 07:49


 


 


 Linezolid  300 ml @ 


 300 mls/hr  Q12H


 IVPB


   8/29/20 15:00


 9/5/20 14:59  8/31/20 03:12


 


 


 Meropenem 1 gm/


 Sodium Chloride  55 ml @ 


 110 mls/hr  Q12H


 IVPB


   8/26/20 16:00


 9/5/20 15:59  8/31/20 03:51


 


 


 Micafungin Sodium


 100 mg/Sodium


 Chloride  110 ml @ 


 110 mls/hr  Q24H


 IVPB


   8/27/20 14:00


 9/3/20 13:59  8/30/20 13:04


 


 


 Midodrine


  (Pro-Amatine)  10 mg  Q8HR


 GT


   8/28/20 14:00


 11/26/20 13:59  8/31/20 05:38


 


 


 Norepinephrine


 Bitartrate 16 mg/


 Dextrose  500 ml @ 0


 mls/hr  Q24H


 IV


   8/31/20 07:45


 9/29/20 12:59  8/31/20 08:10


 


 


 Ondansetron HCl


  (Zofran)  4 mg  Q6H  PRN


 IVP


 Nausea & Vomiting  8/26/20 12:00


 9/21/20 11:59   


 


 


 Pantoprazole


  (Protonix)  40 mg  DAILY


 IV


   8/27/20 09:00


 9/22/20 08:59  8/31/20 07:49


 


 


 Phenylephrine HCl


 50 mg/Dextrose  250 ml @ 0


 mls/hr  Q24H  PRN


 IV


 For hypotension  8/29/20 17:45


 9/28/20 17:44  8/31/20 01:49


 


 


 Polyethylene


 Glycol


  (Miralax)  17 gm  DAILYPRN  PRN


 GT


 Constipation  8/26/20 12:00


 9/21/20 11:59   


 


 


 Sodium Chloride  1,000 ml @ 


 50 mls/hr  Q20H


 IV


   8/31/20 09:32


 9/30/20 09:31  8/31/20 10:10


 


 


 Valproic Acid


  (Depakene)  500 mg  EVERY 8  HOURS


 GT


   8/26/20 14:00


 9/21/20 21:59  8/30/20 13:03


 

















Rema Gomes M.D. Aug 31, 2020 11:28

## 2020-08-31 NOTE — NUR
NURSE NOTES:

Levophed titrated down to 14mcg. /67 HR 68. Will wait to titrate again. Patient stable 
at this time.

## 2020-08-31 NOTE — DIAGNOSTIC IMAGING REPORT
Indications: Needs long-term IV access

 

Technique: Procedure performed at bedside. Procedural timeout performed. Ultrasound

confirms patent compressible left distal vein. Total sterile technique, including

sterile probe cover and sterile gel, sterile gloves, hand hygiene, hat, mask,,

sterile gown, large sterile drape, and preparation with 2% chlorhexidine utilized.

Local anesthesia with 1% lidocaine. Under real-time ultrasound guidance, puncture

basilic vein using 21-gauge needle, passage 0.018 guidewire, exchange for 4 Guamanian

peel-away sheath. 4 Guamanian Bard dual-lumen power PICC cut to 40 cm. It was inserted

through the peel-away sheath. Peel-away sheath and guidewire removed. Catheter fixed

to the skin. Both catheter ports aspirated and flushed. Patient tolerated procedure

well, without immediate complication. Followup chest x-ray obtained, documents

catheter tip position at the cavoatrial junction

 

Impression: Successful bedside placement of left arm PICC under sonographic guidance,

as described above.

## 2020-08-31 NOTE — BRIEF OPERATIVE NOTE
Immediate Post Operative Note


Operative Note


Pre-op Diagnosis:


needs long term IV access


Procedure:


PICC


Surgeon:  МАРИНА Cruz


Anesthesia:  local


Specimen:  none


Complications:  none


Fluids:  none


Implant(s) used?:  No











Scott Cruz MD Aug 31, 2020 16:45 Test(s) completed: PAP smear  Results: per Dr Lisa Lopez \" atypical cells, This is ok post radiation\"  Plan: left patient a message to call back for results. 9/10/19- @ 217 pm- Spoke with patient regarding results. Follow up as planned in February.

## 2020-08-31 NOTE — NUR
NURSE HAND-OFF REPORT: 



Latest Vital Signs: Temperature 97.5 , Pulse 72 , B/P 103 /53 , Respiratory Rate 30 , O2 SAT 
96 , Mechanical Ventilator, O2 Flow Rate 15.0 .  

Vital Sign Comment: STABLE



EKG Rhythm: Sinus Rhythm

Rhythm change?: N 

MD Notified?: -

MD Response: 



Latest Momin Fall Score: 50  

Fall Risk: High Risk 

Safety Measures: Call light Within Reach, Bed Alarm Zone 2, Side Rails Side Rails x3, Bed 
position Low and Locked.

Fall Precautions: 

Yellow Socks

Door Sign

Patient Fall Education



Report given to Samira TRAYLOR. Patient stable. Plan of care endorsed. Patient now at [facility].

## 2020-08-31 NOTE — NUR
RD ASSESSMENT & RECOMMENDATIONS

SEE CARE ACTIVITY FOR COMPLETE ASSESSMENT



DAILY ESTIMATED NEEDS:

Needs based on CRITICAL CARE, 50kg  

22-27  kcals/kg 

9105-3497  total kcals

1.25-2  g protein/kg

  g total protein 

25-30  mL/kg

4615-5660  total fluid mLs



NUTRITION DIAGNOSIS:

Swallowing difficulty r/t dysphagia as evdienced by pt w/ Downs Syndrome,

PEG dep for all nutritional needs, now intubated, pressor support, ICU

status.



 

CURRENT TF: Jevity 1.2 @30ml/hr -> held 



 



ENTERAL NUTRITION RECOMMENDATIONS:

WITH HEMODYNAMIC STBAILITY: VITAL AF 1.2 @50ml/hr x22 hrs   to provide 1100ml, 1320 kcal, 
83g pro, 892ml free H2O  



- As medically able, start VITAL 1.2 @20ml/hr for critical care and high pro needs.

- Advance as tolerated to goal when hemodynamically stable

- Hold TF 1 hr before and after synthroid meds

- Flush per MD, HOB over 30 degrees.

-------------

***WITHOUT HEMODYANAMIC STABILITY, rec trophic feeds of Vital AF 1.2 @ 10ml/hr***



 



ADDITIONAL RECOMMENDATIONS:

1) Ht of 61 inches per SNF; recalibrate bed scale for accurate CBW  

2) Monitor BG closely w/ Solucortef, need for NISS 

3) Monitor hemodynamic stability: on pressors x 2 (Levo @ 28, PHE @ 60) 

        -> rec trophic feeds while not hemodynamically stable 

4) Wound healing: add Vit C 250mg QD,  DERICK in 4oz H2O BID via GT   

5) Monitor lytes, replete as needed

## 2020-08-31 NOTE — NEPHROLOGY PROGRESS NOTE
Assessment/Plan


Problem List:  


(1) DAVID (acute kidney injury)


(2) Respiratory failure requiring intubation


(3) Down's syndrome


(4) Seizure disorder


(5) Hypothyroidism


Assessment





Acute renal failure, likely due to hypotension


Acute respiratory distress, hypoxia


Seizure disorder


Hypothyroidism


Down syndrome


Full code











Fluid challenge with IV fluids and albumin


Midodrine for BP above 100 systolic


Check TSH level


Check


Correct level


Monitor renal parameters


Urine studies


Per orders


Plan


August 31: Continues to be intubated.  Labs reviewed.  Serum creatinine 1.9 

unchanged.  Blood pressure more stable.  Off 1 of the pressors.  Continue to 

monitor renal parameters.  Continue per consultants.  Patient now on 

hydrocortisone 100 mg every 8 hours.  Will decrease IV fluid.  Normal saline 

down to 50 cc an hour.


August 30: Intubated.  Labs reviewed.  Creatinine 1.9 unchanged.  Continue same 

treatment plan.  Per consultants.  Overall poor prognosis since the patient 

remains on pressors and her pulmonary status is worsening.


August 29: Remains intubated.  Labs reviewed.  Creatinine 1.9.  Blood pressure 

systolic 90s.  Continue per consultants.


August 28: Remains intubated.  Labs reviewed.  Serum creatinine lower to 2.  

Vancomycin level lower.  Remains hypotensive on pressors.  Will increase 

midodrine to 10 mg every 8 hours.  Continue per consultants.  Continue to 

monitor renal parameters.


August 27: Patient now in ICU.  Intubated.  On pressors.  Labs reviewed.  Will 

increase midodrine.  Aim to keep blood pressure over 100 systolic.  Will give 

albumin bolus.  Will check vancomycin level which was elevated when checked 

previously on August 24.  Will monitor renal parameters.  Continue per 

consultants.





Subjective


ROS Limited/Unobtainable:  Yes





Objective


Objective





Last 24 Hour Vital Signs








  Date Time  Temp Pulse Resp B/P (MAP) Pulse Ox O2 Delivery O2 Flow Rate FiO2


 


8/31/20 08:10    150/64    


 


8/31/20 07:04  95 30     100


 


8/31/20 07:00  77 30 148/61 (90) 99   


 


8/31/20 06:45  77 30 134/78 (96) 99   


 


8/31/20 06:30  82 30 130/60 (83) 100   


 


8/31/20 06:30    130/60    


 


8/31/20 06:20  81 30 149/49 (82) 100   


 


8/31/20 06:15  82 30 149/108 (122) 100   


 


8/31/20 06:15    149/108    


 


8/31/20 06:00    136/77    


 


8/31/20 06:00  97 31 136/77 (96) 97   


 


8/31/20 05:34  107 32     100


 


8/31/20 05:30  96 29 115/75 (88) 92   


 


8/31/20 05:00  82 30 159/96 (117) 98   


 


8/31/20 05:00    159/96    


 


8/31/20 04:30  85 30 138/93 (108) 95   


 


8/31/20 04:00        100


 


8/31/20 04:00 98.9 82 29 113/96 (102) 98   


 


8/31/20 04:00  75      


 


8/31/20 04:00    113/96    


 


8/31/20 04:00      Mechanical Ventilator  





      Mechanical Ventilator  


 


8/31/20 03:45  78 30 130/76 (94) 100   


 


8/31/20 03:30  73 30     100


 


8/31/20 03:30  73 30 149/69 (95) 100   


 


8/31/20 03:15  78 30 132/57 (82) 100   


 


8/31/20 03:04  89 27 119/103 (108) 99   


 


8/31/20 03:00    123/106    


 


8/31/20 03:00  88 29 123/106 (112) 96   


 


8/31/20 02:45  88 23 110/87 (95) 93   


 


8/31/20 02:30  68 30 124/52 (76) 99   


 


8/31/20 02:15  81 29 106/75 (85) 100   


 


8/31/20 02:00  78 30 107/66 (80) 100   


 


8/31/20 02:00    107/66    


 


8/31/20 01:49  84  129/85    


 


8/31/20 01:45  82 30 103/90 (94) 99   


 


8/31/20 01:30  90 27 129/85 (100) 97   


 


8/31/20 01:15  82 30 115/54 (74) 100   


 


8/31/20 01:00    117/60    


 


8/31/20 01:00  79 30 117/60 (79) 99   


 


8/31/20 00:50  79 30     100


 


8/31/20 00:45  87 30 98/84 (89) 97   


 


8/31/20 00:30  83 29 113/97 (102) 100   


 


8/31/20 00:15  79 30 104/88 (93) 100   


 


8/31/20 00:00      Mechanical Ventilator  





      Mechanical Ventilator  


 


8/31/20 00:00  87      


 


8/31/20 00:00    105/85    


 


8/31/20 00:00 99.0 87 28 105/85 (92) 96   


 


8/31/20 00:00        100


 


8/30/20 23:45  87 30 112/94 (100) 100   


 


8/30/20 23:30  88 29 109/70 (83) 98   


 


8/30/20 23:15  94 26 97/51 (66) 97   


 


8/30/20 23:00  83 29 109/69 (82) 99   


 


8/30/20 23:00    109/69    


 


8/30/20 22:55  83 30     100


 


8/30/20 22:47    106/57    


 


8/30/20 22:45  81 30 106/57 (73) 99   


 


8/30/20 22:30  85 30 91/67 (75) 99   


 


8/30/20 22:15  89 28 91/66 (74) 94   


 


8/30/20 22:00    103/89    


 


8/30/20 22:00  85 26 103/89 (94) 99   


 


8/30/20 21:45  80 30 83/65 (71) 100   


 


8/30/20 21:30  79 30 107/44 (65) 99   


 


8/30/20 21:21  83 30 99/60 (73) 100   


 


8/30/20 21:15  83 30 73/38 (50) 99   


 


8/30/20 21:15    73/38    


 


8/30/20 21:10  87 30 97/53 (68) 93   


 


8/30/20 21:00    88/63    


 


8/30/20 21:00  94 26 88/63 (71) 94   


 


8/30/20 20:45  86 28 88/66 (73) 100   


 


8/30/20 20:36  80 30     100


 


8/30/20 20:30  84 30 93/51 (65) 100   


 


8/30/20 20:15  80 30 102/59 (73) 100   


 


8/30/20 20:00      Mechanical Ventilator  





      Mechanical Ventilator  


 


8/30/20 20:00    106/71    


 


8/30/20 20:00        100


 


8/30/20 20:00 98.5 82 30 106/71 (83) 100   


 


8/30/20 19:45  82 30 93/69 (77) 99   


 


8/30/20 19:30  85 28 117/91 (100) 100   


 


8/30/20 19:20  81      


 


8/30/20 19:07  80 30  100 Mechanical Ventilator  100





  84 30     100


 


8/30/20 19:00    101/53    


 


8/30/20 19:00  80 30 98/65 (76) 100   


 


8/30/20 18:45    98/65    


 


8/30/20 18:30  84 30 93/60 (71) 100   


 


8/30/20 18:30    103/65    


 


8/30/20 18:15    93/60    


 


8/30/20 18:00  91 30 90/54 (66) 98   


 


8/30/20 18:00    90/54    


 


8/30/20 17:30 98.6 99 26 117/96 (103) 93   


 


8/30/20 17:18  89 30     100


 


8/30/20 17:00    93/73    


 


8/30/20 17:00  88 30 91/29 (49) 95   


 


8/30/20 16:30  87 30 100/69 (79) 96   


 


8/30/20 16:00      Mechanical Ventilator  





      Mechanical Ventilator  


 


8/30/20 16:00        100


 


8/30/20 16:00  97 30 123/39 (67) 97   


 


8/30/20 16:00    123/39    


 


8/30/20 16:00  88      


 


8/30/20 15:30  77 30 125/48 (73) 99   


 


8/30/20 15:29  69 30  98 Mechanical Ventilator  100





  78 30     100


 


8/30/20 15:00    131/62    


 


8/30/20 15:00  73 30 131/62 (85) 98   


 


8/30/20 14:45    130/50    


 


8/30/20 14:30  73 30 131/32 (65) 98   


 


8/30/20 14:30    131/32    


 


8/30/20 14:15    113/64    


 


8/30/20 14:00  73 30 113/64 (80) 98   


 


8/30/20 14:00    112/86    


 


8/30/20 14:00  75  110/61    


 


8/30/20 13:30  75 30 67/14 (31) 98   


 


8/30/20 13:06  71 30     100


 


8/30/20 13:04    126/62    


 


8/30/20 13:00    126/62    


 


8/30/20 13:00  72 30 126/62 (83) 98   


 


8/30/20 12:30  72 30 138/62 (87) 98   


 


8/30/20 12:00        100


 


8/30/20 12:00      Mechanical Ventilator  





      Mechanical Ventilator  


 


8/30/20 12:00 98.3 72 30 110/45 (66) 98   


 


8/30/20 12:00    96/68    


 


8/30/20 12:00  69      


 


8/30/20 11:30  74 30     100


 


8/30/20 11:28  81 30 113/92 (99) 99   


 


8/30/20 11:00    103/60    


 


8/30/20 11:00  76 29 115/66 (82) 98   


 


8/30/20 10:30  78 29 112/68 (83) 98   


 


8/30/20 10:00    80/52    


 


8/30/20 10:00  79 30 80/52 (61) 96   


 


8/30/20 09:30  70 30 125/51 (75) 100   

















Intake and Output  


 


 8/30/20 8/31/20





 19:00 07:00


 


Intake Total 2894.80 ml 2280.920 ml


 


Output Total 1130 ml 910 ml


 


Balance 1764.80 ml 1370.920 ml


 


  


 


Free Water 30 ml 


 


IV Total 2744.80 ml 2140.920 ml


 


Tube Feeding 120 ml 60 ml


 


Other  80 ml


 


Output Urine Total 1130 ml 910 ml


 


# Bowel Movements 1 








Laboratory Tests


8/31/20 06:09: 


White Blood Count 16.4H, Red Blood Count 3.62L, Hemoglobin 12.3, Hematocrit 37.9

, Mean Corpuscular Volume 105H, Mean Corpuscular Hemoglobin 34.1H, Mean 

Corpuscular Hemoglobin Concent 32.5, Red Cell Distribution Width 13.6, Platelet 

Count 134L, Mean Platelet Volume 7.2, Neutrophils (%) (Auto) , Lymphocytes (%) (

Auto) , Monocytes (%) (Auto) , Eosinophils (%) (Auto) , Basophils (%) (Auto) , 

Neutrophils % (Manual) [Pending], Lymphocytes % (Manual) [Pending], Platelet 

Estimate [Pending], Platelet Morphology [Pending], Sodium Level 134L, Potassium 

Level 4.4, Chloride Level 104, Carbon Dioxide Level 18L, Anion Gap 12, Blood 

Urea Nitrogen 17, Creatinine 1.9H, Estimat Glomerular Filtration Rate 27.2, 

Glucose Level 142H, Calcium Level 8.5


8/31/20 07:04: 


Arterial Blood pH 7.327L, Arterial Blood Partial Pressure CO2 27.7L, Arterial 

Blood Partial Pressure O2 95.6, Arterial Blood HCO3 14.2*L, Arterial Blood 

Oxygen Saturation 97.5, Arterial Blood Base Excess -10.3*L, Cole Test Positive


Height (Feet):  5


Height (Inches):  3.00


Weight (Pounds):  177


General Appearance:  no apparent distress


EENT:  other - Trach to vent


Cardiovascular:  normal rate


Respiratory/Chest:  decreased breath sounds


Abdomen:  soft











Lam Nino MD Aug 31, 2020 09:30

## 2020-08-31 NOTE — NUR
NURSE NOTES:

ONGOING LEVOPHED 28MCG/MIN AND PHENYLEPHRINE 60MCG/MIN VIA TLC, NO ACUTE DISTRESS NOTED AT 
THIS SHIFT.

## 2020-08-31 NOTE — INTERNAL MED PROGRESS NOTE
Subjective


Date of Service:  Aug 31, 2020


Physician Name


Sanya Schultz


Attending Physician


Chano Cherry MD





Current Medications








 Medications


  (Trade)  Dose


 Ordered  Sig/Didi


 Route


 PRN Reason  Start Time


 Stop Time Status Last Admin


Dose Admin


 


 Acetaminophen


  (Tylenol)  650 mg  Q4H  PRN


 GT


 FEVER  8/26/20 11:45


 9/25/20 11:44  8/29/20 23:15


 


 


 Albuterol/


 Ipratropium


  (Albuterol/


 Ipratropium)  3 ml  Q4H  PRN


 HHN


 Shortness of Breath  8/29/20 11:30


 9/3/20 11:29   


 


 


 Albuterol/


 Ipratropium


  (Albuterol/


 Ipratropium)  3 ml  TIDRT


 HHN


   8/29/20 13:00


 9/3/20 12:59  8/31/20 12:24


 


 


 Chlorhexidine


 Gluconate


  (Beatrice-Hex 2%)  1 applic  DAILY@2000


 TOPIC


   8/31/20 20:00


 11/29/20 19:59  8/31/20 19:33


 


 


 Clotrimazole


  (Lotrimin)  1 applic  Q12HR


 TOPIC


   8/30/20 13:00


 11/28/20 12:59  8/31/20 07:50


 


 


 Daptomycin 350 mg/


 Sodium Chloride  55 ml @ 


 100 mls/hr  Q48H


 IV


   8/28/20 11:00


 9/5/20 10:59  8/30/20 11:36


 


 


 Dextrose


  (Dextrose 50%)  25 ml  Q30M  PRN


 IV


 Hypoglycemia  8/26/20 11:30


 11/20/20 11:29   


 


 


 Dextrose


  (Dextrose 50%)  50 ml  Q30M  PRN


 IV


 Hypoglycemia  8/26/20 11:30


 11/20/20 11:29   


 


 


 Heparin Sodium/


 Sodium Chloride


  (Heparin 1000


 units/500ml


 Premix)  1,000 unit  ONCE  PRN


 IV


 PICC  8/31/20 12:37


 8/31/20 23:59   


 


 


 Heparin Sodium/


 Sodium Chloride


  (Heparin 1000


 units/500ml


 Premix)  1,000 unit  ONCE  PRN


 IV


 PICC  8/31/20 14:41


 8/31/20 23:59   


 


 


 Hydrocortisone


  (Solu-CORTEF)  100 mg  EVERY 8  HOURS


 IV


   8/30/20 14:00


 11/28/20 13:59  8/31/20 15:06


 


 


 Levothyroxine


 Sodium


  (Synthroid)  75 mcg  DAILY


 GT


   8/27/20 09:00


 9/22/20 08:59  8/31/20 07:49


 


 


 Lidocaine HCl


  (Xylocaine 1%


 30ml)  30 ml  ONCE  PRN


 INJ


 PICC  8/31/20 12:36


 8/31/20 23:59   


 


 


 Lidocaine HCl


  (Xylocaine 1%


 30ml)  30 ml  ONCE  PRN


 INJ


 PICC  8/31/20 14:41


 8/31/20 23:59   


 


 


 Linezolid  300 ml @ 


 300 mls/hr  Q12H


 IVPB


   8/29/20 15:00


 9/5/20 14:59  8/31/20 15:06


 


 


 Meropenem 1 gm/


 Sodium Chloride  55 ml @ 


 110 mls/hr  Q12H


 IVPB


   8/26/20 16:00


 9/5/20 15:59  8/31/20 17:27


 


 


 Micafungin Sodium


 100 mg/Sodium


 Chloride  110 ml @ 


 110 mls/hr  Q24H


 IVPB


   8/27/20 14:00


 9/3/20 13:59  8/31/20 15:06


 


 


 Midodrine


  (Pro-Amatine)  10 mg  Q8HR


 GT


   8/28/20 14:00


 11/26/20 13:59  8/31/20 15:06


 


 


 Norepinephrine


 Bitartrate 16 mg/


 Dextrose  500 ml @ 0


 mls/hr  Q24H


 IV


   8/31/20 07:45


 9/29/20 12:59  8/31/20 08:10


 


 


 Ondansetron HCl


  (Zofran)  4 mg  Q6H  PRN


 IVP


 Nausea & Vomiting  8/26/20 12:00


 9/21/20 11:59   


 


 


 Pantoprazole


  (Protonix)  40 mg  DAILY


 IV


   8/27/20 09:00


 9/22/20 08:59  8/31/20 07:49


 


 


 Phenylephrine HCl


 50 mg/Dextrose  250 ml @ 0


 mls/hr  Q24H  PRN


 IV


 For hypotension  8/29/20 17:45


 9/28/20 17:44  8/31/20 01:49


 


 


 Polyethylene


 Glycol


  (Miralax)  17 gm  DAILYPRN  PRN


 GT


 Constipation  8/26/20 12:00


 9/21/20 11:59   


 


 


 Sodium Chloride  1,000 ml @ 


 50 mls/hr  Q20H


 IV


   8/31/20 09:32


 9/30/20 09:31  8/31/20 10:10


 


 


 Valproic Acid


  (Depakene)  500 mg  EVERY 8  HOURS


 GT


   8/26/20 14:00


 9/21/20 21:59  8/31/20 15:06


 








Allergies:  


Coded Allergies:  


     No Known Allergies (Unverified , 1/8/19)


ROS Limited/Unobtainable:  Yes


Subjective


60 YO F with Down's syndrome admitted with hypoxia.  Now sepsis and pneumonia.  

Cover for Int Phi-DR Hung.  ICU.  Intubated and sedated





Objective





Last Vital Signs








  Date Time  Temp Pulse Resp B/P (MAP) Pulse Ox O2 Delivery O2 Flow Rate FiO2


 


8/31/20 19:00  72 30 103/53 (70) 96   


 


8/31/20 17:15 97.5       


 


8/31/20 17:00      Mechanical Ventilator  





      Mechanical Ventilator  


 


8/31/20 17:00        60


 


8/27/20 00:00       15.0 











Laboratory Tests








Test


  8/31/20


06:09 8/31/20


07:04


 


White Blood Count


  16.4 K/UL


(4.8-10.8)  H 


 


 


Red Blood Count


  3.62 M/UL


(4.20-5.40)  L 


 


 


Hemoglobin


  12.3 G/DL


(12.0-16.0) 


 


 


Hematocrit


  37.9 %


(37.0-47.0) 


 


 


Mean Corpuscular Volume


  105 FL (80-99)


H 


 


 


Mean Corpuscular Hemoglobin


  34.1 PG


(27.0-31.0)  H 


 


 


Mean Corpuscular Hemoglobin


Concent 32.5 G/DL


(32.0-36.0) 


 


 


Red Cell Distribution Width


  13.6 %


(11.6-14.8) 


 


 


Platelet Count


  134 K/UL


(150-450)  L 


 


 


Mean Platelet Volume


  7.2 FL


(6.5-10.1) 


 


 


Neutrophils (%) (Auto)


  % (45.0-75.0)


  


 


 


Lymphocytes (%) (Auto)


  % (20.0-45.0)


  


 


 


Monocytes (%) (Auto)  % (1.0-10.0)   


 


Eosinophils (%) (Auto)  % (0.0-3.0)   


 


Basophils (%) (Auto)  % (0.0-2.0)   


 


Differential Total Cells


Counted 100  


  


 


 


Neutrophils % (Manual) 85 % (45-75)  H 


 


Lymphocytes % (Manual) 10 % (20-45)  L 


 


Monocytes % (Manual) 5 % (1-10)   


 


Eosinophils % (Manual) 0 % (0-3)   


 


Basophils % (Manual) 0 % (0-2)   


 


Band Neutrophils 0 % (0-8)   


 


Platelet Estimate Decreased  L 


 


Platelet Morphology Normal   


 


Anisocytosis 1+   


 


Macrocytosis 1+   


 


Sodium Level


  134 MMOL/L


(136-145)  L 


 


 


Potassium Level


  4.4 MMOL/L


(3.5-5.1) 


 


 


Chloride Level


  104 MMOL/L


() 


 


 


Carbon Dioxide Level


  18 MMOL/L


(21-32)  L 


 


 


Anion Gap


  12 mmol/L


(5-15) 


 


 


Blood Urea Nitrogen


  17 mg/dL


(7-18) 


 


 


Creatinine


  1.9 MG/DL


(0.55-1.30)  H 


 


 


Estimat Glomerular Filtration


Rate 27.2 mL/min


(>60) 


 


 


Glucose Level


  142 MG/DL


()  H 


 


 


Calcium Level


  8.5 MG/DL


(8.5-10.1) 


 


 


Arterial Blood pH


  


  7.327


(7.350-7.450)


 


Arterial Blood Partial


Pressure CO2 


  27.7 mmHg


(35.0-45.0)  L


 


Arterial Blood Partial


Pressure O2 


  95.6 mmHg


(75.0-100.0)


 


Arterial Blood HCO3


  


  14.2 mmol/L


(22.0-26.0)  *L


 


Arterial Blood Oxygen


Saturation 


  97.5 %


()


 


Arterial Blood Base Excess


  


  -10.3 (-2-2)


*L


 


Cole Test  Positive  











Microbiology








 Date/Time


Source Procedure


Growth Status


 


 


 8/29/20 11:50


Blood Blood Culture - Preliminary


NO GROWTH AFTER 24 HOURS Resulted


 


 8/29/20 11:40


Blood Blood Culture - Preliminary


NO GROWTH AFTER 24 HOURS Resulted





 8/29/20 09:30


Abdomen Gram Stain - Final Resulted


 


 8/29/20 09:30 Wound Culture - Preliminary


Yeast Species Resulted

















Intake and Output  


 


 8/30/20 8/31/20





 19:00 07:00


 


Intake Total 2894.80 ml 2280.920 ml


 


Output Total 1130 ml 910 ml


 


Balance 1764.80 ml 1370.920 ml


 


  


 


Free Water 30 ml 


 


IV Total 2744.80 ml 2140.920 ml


 


Tube Feeding 120 ml 60 ml


 


Other  80 ml


 


Output Urine Total 1130 ml 910 ml


 


# Bowel Movements 1 








Objective


General Appearance:  WD/WN, no apparent distress, alert


EENT:  PERRL/EOMI, normal ENT inspection


Neck:  non-tender, normal alignment, supple, normal inspection


Cardiovascular:  normal peripheral pulses, normal rate, regular rhythm, no 

gallop/murmur, no JVD


Respiratory/Chest:  Mech vent; decreased breath sounds, crackles/rales, rhonchi 

- bilaterally, expiratory wheezing


Abdomen:  normal bowel sounds, non tender, soft, no organomegaly, no mass


Extremities:  normal range of motion


Neurologic:  CNs II-XII grossly normal


Skin:  normal pigmentation, warm/dry





Assessment/Plan


Problem List:  


(1) HCAP (healthcare-associated pneumonia)


Assessment & Plan:  Strep Group G.  Continue daptomycin per ID=Dr Gomes.  

Pulmonary/Critical care=DR Cherry.  COVID NEG





(2) Sepsis


Assessment & Plan:  Staph haemolyticus.  Continue daptomycin and ceftriaxone 

per ID=Dr Gomes





(3) Down's syndrome


(4) Dysphagia


Assessment & Plan:  S/P PEG





(5) Seizure disorder


Assessment & Plan:  Continue keppra and depakote





(6) Hypothyroidism


Assessment & Plan:  Continue synthroid





(7) Acute respiratory failure


Assessment & Plan:  Pulmonary = Dr Cherry; [facility] vent














Sanya Schultz MD Aug 31, 2020 19:36

## 2020-08-31 NOTE — NUR
NURSE NOTES:

Patient stable AOx0 with no s/sx of pain or distress. RR even and unlabored through ETT tube 
7.5 lip line 22. Vent settings AC 30 500mL 100% P5. Cardiac sounds benign. BL lung sounds 
rhoncus. Pitting edema +2 on BL hands and feet with weak pulses to dorsalis pedis. Valle 
draining well to gravity. LT Femoral TLC line dressing dry and intact, patent. IV fluids and 
Levophed at 28mcg and Lennox infusing. Bowel sounds present. Gastric tube in place with no 
residual. Side rails upx2 call light within reach, bed low and locked. Will continue to 
monitor.

## 2020-08-31 NOTE — NUR
CASE MANAGEMENT: REVIEW



08/31/20



SI: BACTEREMIA . DAVID . ACUTE RESPIRATORY FAILURE .S/P CODE BLUE 8/26 AND INTUBATED . HCAP 
.SEPSIS . 

PHX: ENCEPHALOPATHY. DOWN SYNDROME . SEIZURE DISORDER

97.8   72   30   146/63   100% ENDOTRACHEAL FiO2 100

WBC 16.4     CREAT 1.9  

ABG: pH 7.327  CO2 27.7    HCO3 14.2 BASE EXCESS -10.3



IS: LEVOPHED GTT PROTOCOL

IV DAPTOMYCIN Q48HR

IV MEROPENEM BID

IV AZITHROMYCIN QD

IVF NS @50ML/HR

IV SYNTHROID GT QAC

IV DEPAKENE GT TID

PRO-AMATINE GT Q6HR 



\**: ICU 

[facility]: [facility]

PLAN: 

WOUND CARE FRO LACERATION TO FOOT 

IV HYDRATION 

WEANING OFF LEVOPHED

PICC LINE TODAY

## 2020-08-31 NOTE — NUR
NURSE HAND-OFF REPORT: 



Latest Vital Signs: Temperature 98.9 , Pulse 81 , B/P 130 /60 , Respiratory Rate 30 , O2 SAT 
100 , Mechanical Ventilator, O2 Flow Rate 15.0 .  

Vital Sign Comment: 



EKG Rhythm: Sinus Rhythm

Rhythm change?: N 

MD Notified?: -

MD Response: 



Latest Momin Fall Score: 50  

Fall Risk: High Risk 

Safety Measures: Call light Within Reach, Bed Alarm Zone 2, Side Rails Side Rails x3, Bed 
position Low and Locked.

Fall Precautions: 

Yellow Socks

Door Sign

Patient Fall Education



Report given to JIN NGUYEN RN.

## 2020-08-31 NOTE — NUR
NURSE NOTES:

Received pt obtunded opened eyes to pain stimuli only,orally intubated on ac mode, with 60% 
fio2 02 sat 95% SR on the monitor, Bp  been supported with Levophed drip at 10mcg/min  as 
well as Ns at 50ml/hr. Tolerating  Jevity 1.2 at 20c/hr, residuals been acceptable Valle to 
gravity with yellowish urine moderate in amt. Pt with new CARLOS PICC line where IV drips 
infusing well. Site with drsg dry and intact. Pt with sacral redness , bilateral toes with 
fungal infection mds are aware with TX on. Pt also has generalized body swelling, 
extremities supported with pillows, padded siderails up placed on seizure precautions. Will 
continue to monitor.

## 2020-09-01 VITALS — SYSTOLIC BLOOD PRESSURE: 122 MMHG | DIASTOLIC BLOOD PRESSURE: 106 MMHG

## 2020-09-01 VITALS — DIASTOLIC BLOOD PRESSURE: 52 MMHG | SYSTOLIC BLOOD PRESSURE: 126 MMHG

## 2020-09-01 VITALS — SYSTOLIC BLOOD PRESSURE: 112 MMHG | DIASTOLIC BLOOD PRESSURE: 44 MMHG

## 2020-09-01 VITALS — DIASTOLIC BLOOD PRESSURE: 60 MMHG | SYSTOLIC BLOOD PRESSURE: 94 MMHG

## 2020-09-01 VITALS — DIASTOLIC BLOOD PRESSURE: 67 MMHG | SYSTOLIC BLOOD PRESSURE: 95 MMHG

## 2020-09-01 VITALS — SYSTOLIC BLOOD PRESSURE: 113 MMHG | DIASTOLIC BLOOD PRESSURE: 99 MMHG

## 2020-09-01 VITALS — SYSTOLIC BLOOD PRESSURE: 90 MMHG | DIASTOLIC BLOOD PRESSURE: 53 MMHG

## 2020-09-01 VITALS — SYSTOLIC BLOOD PRESSURE: 98 MMHG | DIASTOLIC BLOOD PRESSURE: 51 MMHG

## 2020-09-01 VITALS — SYSTOLIC BLOOD PRESSURE: 113 MMHG | DIASTOLIC BLOOD PRESSURE: 52 MMHG

## 2020-09-01 VITALS — SYSTOLIC BLOOD PRESSURE: 87 MMHG | DIASTOLIC BLOOD PRESSURE: 65 MMHG

## 2020-09-01 VITALS — SYSTOLIC BLOOD PRESSURE: 108 MMHG | DIASTOLIC BLOOD PRESSURE: 49 MMHG

## 2020-09-01 VITALS — SYSTOLIC BLOOD PRESSURE: 111 MMHG | DIASTOLIC BLOOD PRESSURE: 90 MMHG

## 2020-09-01 VITALS — DIASTOLIC BLOOD PRESSURE: 45 MMHG | SYSTOLIC BLOOD PRESSURE: 91 MMHG

## 2020-09-01 VITALS — SYSTOLIC BLOOD PRESSURE: 120 MMHG | DIASTOLIC BLOOD PRESSURE: 59 MMHG

## 2020-09-01 VITALS — SYSTOLIC BLOOD PRESSURE: 97 MMHG | DIASTOLIC BLOOD PRESSURE: 46 MMHG

## 2020-09-01 VITALS — SYSTOLIC BLOOD PRESSURE: 136 MMHG | DIASTOLIC BLOOD PRESSURE: 69 MMHG

## 2020-09-01 VITALS — SYSTOLIC BLOOD PRESSURE: 116 MMHG | DIASTOLIC BLOOD PRESSURE: 56 MMHG

## 2020-09-01 VITALS — DIASTOLIC BLOOD PRESSURE: 71 MMHG | SYSTOLIC BLOOD PRESSURE: 110 MMHG

## 2020-09-01 VITALS — SYSTOLIC BLOOD PRESSURE: 93 MMHG | DIASTOLIC BLOOD PRESSURE: 53 MMHG

## 2020-09-01 VITALS — DIASTOLIC BLOOD PRESSURE: 73 MMHG | SYSTOLIC BLOOD PRESSURE: 133 MMHG

## 2020-09-01 VITALS — SYSTOLIC BLOOD PRESSURE: 66 MMHG | DIASTOLIC BLOOD PRESSURE: 44 MMHG

## 2020-09-01 VITALS — SYSTOLIC BLOOD PRESSURE: 128 MMHG | DIASTOLIC BLOOD PRESSURE: 90 MMHG

## 2020-09-01 VITALS — DIASTOLIC BLOOD PRESSURE: 51 MMHG | SYSTOLIC BLOOD PRESSURE: 109 MMHG

## 2020-09-01 VITALS — SYSTOLIC BLOOD PRESSURE: 94 MMHG | DIASTOLIC BLOOD PRESSURE: 70 MMHG

## 2020-09-01 VITALS — SYSTOLIC BLOOD PRESSURE: 96 MMHG | DIASTOLIC BLOOD PRESSURE: 44 MMHG

## 2020-09-01 VITALS — DIASTOLIC BLOOD PRESSURE: 47 MMHG | SYSTOLIC BLOOD PRESSURE: 123 MMHG

## 2020-09-01 VITALS — SYSTOLIC BLOOD PRESSURE: 84 MMHG | DIASTOLIC BLOOD PRESSURE: 47 MMHG

## 2020-09-01 VITALS — SYSTOLIC BLOOD PRESSURE: 98 MMHG | DIASTOLIC BLOOD PRESSURE: 42 MMHG

## 2020-09-01 VITALS — SYSTOLIC BLOOD PRESSURE: 106 MMHG | DIASTOLIC BLOOD PRESSURE: 56 MMHG

## 2020-09-01 VITALS — SYSTOLIC BLOOD PRESSURE: 115 MMHG | DIASTOLIC BLOOD PRESSURE: 53 MMHG

## 2020-09-01 VITALS — DIASTOLIC BLOOD PRESSURE: 53 MMHG | SYSTOLIC BLOOD PRESSURE: 95 MMHG

## 2020-09-01 VITALS — SYSTOLIC BLOOD PRESSURE: 96 MMHG | DIASTOLIC BLOOD PRESSURE: 54 MMHG

## 2020-09-01 VITALS — SYSTOLIC BLOOD PRESSURE: 75 MMHG | DIASTOLIC BLOOD PRESSURE: 55 MMHG

## 2020-09-01 VITALS — DIASTOLIC BLOOD PRESSURE: 90 MMHG | SYSTOLIC BLOOD PRESSURE: 103 MMHG

## 2020-09-01 VITALS — SYSTOLIC BLOOD PRESSURE: 103 MMHG | DIASTOLIC BLOOD PRESSURE: 51 MMHG

## 2020-09-01 VITALS — SYSTOLIC BLOOD PRESSURE: 124 MMHG | DIASTOLIC BLOOD PRESSURE: 55 MMHG

## 2020-09-01 VITALS — SYSTOLIC BLOOD PRESSURE: 107 MMHG | DIASTOLIC BLOOD PRESSURE: 45 MMHG

## 2020-09-01 VITALS — DIASTOLIC BLOOD PRESSURE: 47 MMHG | SYSTOLIC BLOOD PRESSURE: 118 MMHG

## 2020-09-01 VITALS — DIASTOLIC BLOOD PRESSURE: 36 MMHG | SYSTOLIC BLOOD PRESSURE: 71 MMHG

## 2020-09-01 VITALS — DIASTOLIC BLOOD PRESSURE: 67 MMHG | SYSTOLIC BLOOD PRESSURE: 130 MMHG

## 2020-09-01 VITALS — DIASTOLIC BLOOD PRESSURE: 63 MMHG | SYSTOLIC BLOOD PRESSURE: 130 MMHG

## 2020-09-01 VITALS — DIASTOLIC BLOOD PRESSURE: 50 MMHG | SYSTOLIC BLOOD PRESSURE: 96 MMHG

## 2020-09-01 VITALS — DIASTOLIC BLOOD PRESSURE: 57 MMHG | SYSTOLIC BLOOD PRESSURE: 114 MMHG

## 2020-09-01 VITALS — DIASTOLIC BLOOD PRESSURE: 58 MMHG | SYSTOLIC BLOOD PRESSURE: 104 MMHG

## 2020-09-01 VITALS — SYSTOLIC BLOOD PRESSURE: 97 MMHG | DIASTOLIC BLOOD PRESSURE: 71 MMHG

## 2020-09-01 VITALS — DIASTOLIC BLOOD PRESSURE: 79 MMHG | SYSTOLIC BLOOD PRESSURE: 102 MMHG

## 2020-09-01 VITALS — DIASTOLIC BLOOD PRESSURE: 52 MMHG | SYSTOLIC BLOOD PRESSURE: 100 MMHG

## 2020-09-01 VITALS — DIASTOLIC BLOOD PRESSURE: 45 MMHG | SYSTOLIC BLOOD PRESSURE: 104 MMHG

## 2020-09-01 VITALS — SYSTOLIC BLOOD PRESSURE: 116 MMHG | DIASTOLIC BLOOD PRESSURE: 58 MMHG

## 2020-09-01 VITALS — DIASTOLIC BLOOD PRESSURE: 71 MMHG | SYSTOLIC BLOOD PRESSURE: 130 MMHG

## 2020-09-01 VITALS — DIASTOLIC BLOOD PRESSURE: 41 MMHG | SYSTOLIC BLOOD PRESSURE: 105 MMHG

## 2020-09-01 VITALS — SYSTOLIC BLOOD PRESSURE: 100 MMHG | DIASTOLIC BLOOD PRESSURE: 50 MMHG

## 2020-09-01 VITALS — DIASTOLIC BLOOD PRESSURE: 64 MMHG | SYSTOLIC BLOOD PRESSURE: 120 MMHG

## 2020-09-01 VITALS — SYSTOLIC BLOOD PRESSURE: 120 MMHG | DIASTOLIC BLOOD PRESSURE: 54 MMHG

## 2020-09-01 VITALS — DIASTOLIC BLOOD PRESSURE: 113 MMHG | SYSTOLIC BLOOD PRESSURE: 141 MMHG

## 2020-09-01 VITALS — DIASTOLIC BLOOD PRESSURE: 62 MMHG | SYSTOLIC BLOOD PRESSURE: 113 MMHG

## 2020-09-01 LAB
ADD MANUAL DIFF: YES
ALBUMIN SERPL-MCNC: 1.2 G/DL (ref 3.4–5)
ALBUMIN/GLOB SERPL: 0.3 {RATIO} (ref 1–2.7)
ALP SERPL-CCNC: 61 U/L (ref 46–116)
ALT SERPL-CCNC: 11 U/L (ref 12–78)
ANION GAP SERPL CALC-SCNC: 10 MMOL/L (ref 5–15)
AST SERPL-CCNC: 20 U/L (ref 15–37)
BILIRUB SERPL-MCNC: 0.3 MG/DL (ref 0.2–1)
BUN SERPL-MCNC: 26 MG/DL (ref 7–18)
CALCIUM SERPL-MCNC: 8.1 MG/DL (ref 8.5–10.1)
CHLORIDE SERPL-SCNC: 106 MMOL/L (ref 98–107)
CO2 SERPL-SCNC: 19 MMOL/L (ref 21–32)
CREAT SERPL-MCNC: 1.9 MG/DL (ref 0.55–1.3)
ERYTHROCYTE [DISTWIDTH] IN BLOOD BY AUTOMATED COUNT: 13.4 % (ref 11.6–14.8)
GLOBULIN SER-MCNC: 3.6 G/DL
HCT VFR BLD CALC: 29.2 % (ref 37–47)
HGB BLD-MCNC: 9.7 G/DL (ref 12–16)
MCV RBC AUTO: 103 FL (ref 80–99)
PHOSPHATE SERPL-MCNC: 4.9 MG/DL (ref 2.5–4.9)
PLATELET # BLD: 99 K/UL (ref 150–450)
POTASSIUM SERPL-SCNC: 3.5 MMOL/L (ref 3.5–5.1)
RBC # BLD AUTO: 2.83 M/UL (ref 4.2–5.4)
SODIUM SERPL-SCNC: 135 MMOL/L (ref 136–145)
WBC # BLD AUTO: 13.5 K/UL (ref 4.8–10.8)

## 2020-09-01 RX ADMIN — VALPROIC ACID SCH MG: 250 SOLUTION ORAL at 15:07

## 2020-09-01 RX ADMIN — IPRATROPIUM BROMIDE AND ALBUTEROL SULFATE SCH ML: .5; 3 SOLUTION RESPIRATORY (INHALATION) at 19:40

## 2020-09-01 RX ADMIN — VALPROIC ACID SCH MG: 250 SOLUTION ORAL at 06:13

## 2020-09-01 RX ADMIN — DAPTOMYCIN SCH MLS/HR: 500 INJECTION, POWDER, LYOPHILIZED, FOR SOLUTION INTRAVENOUS at 11:09

## 2020-09-01 RX ADMIN — DEXTROSE MONOHYDRATE SCH MLS/HR: 50 INJECTION, SOLUTION INTRAVENOUS at 15:07

## 2020-09-01 RX ADMIN — MEROPENEM SCH MLS/HR: 1 INJECTION INTRAVENOUS at 04:03

## 2020-09-01 RX ADMIN — SODIUM CHLORIDE SCH MLS/HR: 900 INJECTION, SOLUTION INTRAVENOUS at 02:44

## 2020-09-01 RX ADMIN — MIDODRINE HYDROCHLORIDE SCH MG: 10 TABLET ORAL at 15:07

## 2020-09-01 RX ADMIN — CHLORHEXIDINE GLUCONATE SCH APPLIC: 213 SOLUTION TOPICAL at 19:54

## 2020-09-01 RX ADMIN — HYDROCORTISONE SODIUM SUCCINATE SCH MG: 100 INJECTION, POWDER, FOR SOLUTION INTRAMUSCULAR; INTRAVENOUS at 15:07

## 2020-09-01 RX ADMIN — HYDROCORTISONE SODIUM SUCCINATE SCH MG: 100 INJECTION, POWDER, FOR SOLUTION INTRAMUSCULAR; INTRAVENOUS at 06:13

## 2020-09-01 RX ADMIN — PANTOPRAZOLE SODIUM SCH MG: 40 INJECTION, POWDER, FOR SOLUTION INTRAVENOUS at 09:43

## 2020-09-01 RX ADMIN — IPRATROPIUM BROMIDE AND ALBUTEROL SULFATE SCH ML: .5; 3 SOLUTION RESPIRATORY (INHALATION) at 07:05

## 2020-09-01 RX ADMIN — HYDROCORTISONE SODIUM SUCCINATE SCH MG: 100 INJECTION, POWDER, FOR SOLUTION INTRAMUSCULAR; INTRAVENOUS at 21:34

## 2020-09-01 RX ADMIN — IPRATROPIUM BROMIDE AND ALBUTEROL SULFATE SCH ML: .5; 3 SOLUTION RESPIRATORY (INHALATION) at 12:35

## 2020-09-01 RX ADMIN — MIDODRINE HYDROCHLORIDE SCH MG: 10 TABLET ORAL at 21:34

## 2020-09-01 RX ADMIN — MIDODRINE HYDROCHLORIDE SCH MG: 10 TABLET ORAL at 06:13

## 2020-09-01 RX ADMIN — VALPROIC ACID SCH MG: 250 SOLUTION ORAL at 21:34

## 2020-09-01 RX ADMIN — MEROPENEM SCH MLS/HR: 1 INJECTION INTRAVENOUS at 16:03

## 2020-09-01 NOTE — PULMONOLGY CRITICAL CARE NOTE
Critical Care - Asmt/Plan


Problems:  


(1) Acute respiratory failure


(2) Bacteremia


(3) Pneumonia


(4) Sepsis


(5) HCAP (healthcare-associated pneumonia)


(6) Seizure disorder


(7) Down's syndrome


Respiratory:  monitor respiratory rate, adjust FIO2, CXR


Cardiac:  continue pressors, continue to monitor HR/BP


Renal:  F/U I&O, check electrolytes


Infectious Disease:  check cultures


Gastrointestinal:  continue feedings/current rate


Endocrine:  monitor blood sugar, check HgA1C


Hematologic:  transfuse if hgb<8.5


Neurologic:  PRN Ativan


Affect:  PRN ativan


Prophylaxis:  Protonix, Heparin


Notes Reviewed:  internist, cardio, renal


Discussed with:  nurses, consultants, 





Critical Care - Objective





Last 24 Hour Vital Signs








  Date Time  Temp Pulse Resp B/P (MAP) Pulse Ox O2 Delivery O2 Flow Rate FiO2


 


9/1/20 10:54  65 30     60


 


9/1/20 10:15  69 30 96/44 (61) 97   


 


9/1/20 10:00  71 30 96/50 (65) 96   


 


9/1/20 10:00    96/50    


 


9/1/20 09:45    103/90    


 


9/1/20 09:45  82 25 103/90 (94) 95   


 


9/1/20 09:30    116/56    


 


9/1/20 09:30  75 30 103/51 (68) 96   


 


9/1/20 09:15  82 27 116/56 (76) 91   


 


9/1/20 09:14  73 30     60


 


9/1/20 09:00  81 26 130/63 (85) 93   


 


9/1/20 09:00    130/63    


 


9/1/20 08:45  70 30 123/47 (72) 96   


 


9/1/20 08:45    123/47    


 


9/1/20 08:30  73 30 130/67 (88) 96   


 


9/1/20 08:00  78      


 


9/1/20 08:00        60


 


9/1/20 08:00 97.7 75 30 115/53 (73) 96   


 


9/1/20 08:00    115/53    


 


9/1/20 08:00      Mechanical Ventilator  





      Mechanical Ventilator  


 


9/1/20 07:00  82 30  99 Mechanical Ventilator  60





  79 30     60


 


9/1/20 07:00    120/59    


 


9/1/20 07:00  80 30 120/59 (79) 99   


 


9/1/20 06:30  73 30 126/52 (76) 96   


 


9/1/20 06:15  85 30 106/56 (73) 94   


 


9/1/20 06:00    106/56    


 


9/1/20 06:00  86 29 95/67 (76) 96   


 


9/1/20 05:48  99 28 87/65 (72) 87   


 


9/1/20 05:45  87 24 66/44 (51) 89   


 


9/1/20 05:30  82 25 102/79 (87) 91   


 


9/1/20 05:29  87 30     60


 


9/1/20 05:00    136/60    


 


9/1/20 05:00  69 30 113/62 (79) 94   


 


9/1/20 04:30  70 30 130/71 (90) 94   


 


9/1/20 04:00    119/74    


 


9/1/20 04:00 98.8 86 30 122/106 (111) 95   


 


9/1/20 04:00  72      


 


9/1/20 04:00      Mechanical Ventilator  





      Mechanical Ventilator  


 


9/1/20 04:00        60


 


9/1/20 03:30  77 30     60


 


9/1/20 03:30  82 29 136/69 (91) 95   


 


9/1/20 03:00  84 29 133/73 (93) 94   


 


9/1/20 03:00    100/60    


 


9/1/20 02:44    80/45    


 


9/1/20 02:30  75 26 128/90 (103) 94   


 


9/1/20 02:00  79 30 71/36 (48) 94   


 


9/1/20 02:00    75/21    


 


9/1/20 01:30  84 29 141/113 (122) 93   


 


9/1/20 01:30  79 30     60


 


9/1/20 01:00  79 30 120/64 (82) 95   


 


9/1/20 01:00    129/58    


 


9/1/20 00:30  76 30 120/54 (76) 96   


 


9/1/20 00:00        60


 


9/1/20 00:00      Mechanical Ventilator  





      Mechanical Ventilator  


 


9/1/20 00:00    122/56    


 


9/1/20 00:00 98.4 83 30 118/47 (70) 96   


 


9/1/20 00:00  81      


 


8/31/20 23:30  82 30 107/69 (82) 95   


 


8/31/20 23:24  93 30     60


 


8/31/20 23:15  82 29 115/60 (78) 94   


 


8/31/20 23:00  85 27 104/19 (47) 93   


 


8/31/20 23:00    104/55    


 


8/31/20 22:30  78 29 123/60 (81) 96   


 


8/31/20 22:15  79 30 112/53 (72) 96   


 


8/31/20 22:00  75 30 115/49 (71) 96   


 


8/31/20 22:00    115/49    


 


8/31/20 21:45  75 30 113/57 (75) 95   


 


8/31/20 21:30  88 29 103/48 (66) 93   


 


8/31/20 21:29  84 30     60


 


8/31/20 21:15  90 28 106/60 (75) 92   


 


8/31/20 21:00    108/50    


 


8/31/20 21:00  80 30 108/50 (69) 94   


 


8/31/20 20:45  90 26 111/74 (86) 90   


 


8/31/20 20:30  86 28 102/59 (73) 94   


 


8/31/20 20:17  87 27 108/57 (74) 93   


 


8/31/20 20:00 98.8 81 30 109/45 (66) 95   


 


8/31/20 20:00  86      


 


8/31/20 20:00    109/45    


 


8/31/20 20:00      Mechanical Ventilator  





      Mechanical Ventilator  


 


8/31/20 19:45  85 30 113/60 (77) 95   


 


8/31/20 19:30  86 29 105/62 (76) 94   


 


8/31/20 19:30  87 30  94 Mechanical Ventilator  60





  90 30     60


 


8/31/20 19:00  72 30 103/53 (70) 96   


 


8/31/20 19:00    103/53    


 


8/31/20 18:30  74 30 105/57 (73) 95   


 


8/31/20 18:15  82 28 110/65 (80) 95   


 


8/31/20 18:00    110/55    


 


8/31/20 18:00  83 30 110/55 (73) 94   


 


8/31/20 17:45  75 29 119/62 (81) 97   


 


8/31/20 17:30  73 30 122/63 (82) 97   


 


8/31/20 17:15    119/61    


 


8/31/20 17:15 97.5 74 30 119/61 (80) 97   


 


8/31/20 17:00      Mechanical Ventilator  





      Mechanical Ventilator  


 


8/31/20 17:00    111/61    


 


8/31/20 17:00        60


 


8/31/20 17:00  74 30 111/64 (80) 96   


 


8/31/20 16:41  84 30     60


 


8/31/20 16:30  84 29 116/59 (78) 96   


 


8/31/20 16:00  93      


 


8/31/20 16:00  83 29 123/89 (100) 97   


 


8/31/20 16:00    123/89    


 


8/31/20 16:00        100


 


8/31/20 16:00      Mechanical Ventilator  





      Mechanical Ventilator  


 


8/31/20 15:30  82 30 87/48 (61) 97   


 


8/31/20 15:00  87 29 102/60 (74) 94   


 


8/31/20 15:00    102/60    


 


8/31/20 14:48  81 30     100


 


8/31/20 14:30  79 29 104/68 (80) 100   


 


8/31/20 14:00    93/58    


 


8/31/20 14:00  89 27 93/58 (70) 100   


 


8/31/20 13:30  80 29 87/65 (72) 100   


 


8/31/20 13:00  75 30 112/62 (79) 100   


 


8/31/20 13:00    95/65    


 


8/31/20 12:45 98.6 71 29 95/65 (75) 100   


 


8/31/20 12:30  72 29 125/82 (96) 100   


 


8/31/20 12:25  67 30  100 Mechanical Ventilator  100





  65 30     100


 


8/31/20 12:00      Mechanical Ventilator  





      Mechanical Ventilator  


 


8/31/20 12:00    119/70    


 


8/31/20 12:00        100


 


8/31/20 12:00  82      


 


8/31/20 12:00  68 30 119/70 (86) 100   


 


8/31/20 11:45  67 30 110/67 (81) 100   


 


8/31/20 11:45    110/67    


 


8/31/20 11:30    133/57    


 


8/31/20 11:30  64 30 133/57 (82) 100   


 


8/31/20 11:15  69 29 128/62 (84) 100   


 


8/31/20 11:15    131/62    








Status:  sedated


Condition:  critical


HEENT:  atraumatic


Neck:  full ROM


Lungs:  clear


Heart:  HR/BP unstable


Abdomen:  soft


Extremities:  no C/C/E


Micro:





Microbiology








 Date/Time


Source Procedure


Growth Status


 


 


 8/29/20 11:50


Blood Blood Culture - Preliminary


NO GROWTH AFTER 48 HOURS Resulted


 


 8/29/20 11:40


Blood Blood Culture - Preliminary


NO GROWTH AFTER 48 HOURS Resulted








Accucheck:  131





Critical Care - Subjective


ROS Limited/Unobtainable:  Yes


Condition:  critical


EKG Rhythm:  Sinus Rhythm


FI02:  60


Vent Support Breath Rate:  30


Vent Support Mode:  AC


Vent Tidal Volume:  500


Sputum Amount:  Small


PEEP:  5.0


PIP:  34


Tube Feeding Amount:  20


I&O:











Intake and Output  


 


 8/31/20 9/1/20





 19:00 07:00


 


Intake Total 1822.0000 ml 1469.37 ml


 


Output Total 550 ml 670 ml


 


Balance 1272.0000 ml 799.37 ml


 


  


 


Free Water  90 ml


 


IV Total 1622.0000 ml 1139.37 ml


 


Tube Feeding 200 ml 240 ml


 


Output Urine Total 550 ml 670 ml


 


# Bowel Movements 1 5








CXR:


extensive dense, bilateral infiltrate


ET-Tube:  7.5


ET Position:  22


Labs:





Laboratory Tests








Test


  9/1/20


04:00


 


White Blood Count


  13.5 K/UL


(4.8-10.8)  H


 


Red Blood Count


  2.83 M/UL


(4.20-5.40)  L


 


Hemoglobin


  9.7 G/DL


(12.0-16.0)  L


 


Hematocrit


  29.2 %


(37.0-47.0)  L


 


Mean Corpuscular Volume


  103 FL (80-99)


H


 


Mean Corpuscular Hemoglobin


  34.2 PG


(27.0-31.0)  H


 


Mean Corpuscular Hemoglobin


Concent 33.2 G/DL


(32.0-36.0)


 


Red Cell Distribution Width


  13.4 %


(11.6-14.8)


 


Platelet Count


  99 K/UL


(150-450)  L


 


Mean Platelet Volume


  8.2 FL


(6.5-10.1)


 


Neutrophils (%) (Auto)


  % (45.0-75.0)


 


 


Lymphocytes (%) (Auto)


  % (20.0-45.0)


 


 


Monocytes (%) (Auto)  % (1.0-10.0)  


 


Eosinophils (%) (Auto)  % (0.0-3.0)  


 


Basophils (%) (Auto)  % (0.0-2.0)  


 


Differential Total Cells


Counted 100  


 


 


Neutrophils % (Manual) 84 % (45-75)  H


 


Lymphocytes % (Manual) 10 % (20-45)  L


 


Monocytes % (Manual) 6 % (1-10)  


 


Eosinophils % (Manual) 0 % (0-3)  


 


Basophils % (Manual) 0 % (0-2)  


 


Band Neutrophils 0 % (0-8)  


 


Platelet Estimate Decreased  L


 


Platelet Morphology Normal  


 


Macrocytosis 1+  


 


Erythrocyte Sedimentation Rate


  68 MM/HR


(0-30)  H


 


Sodium Level


  135 MMOL/L


(136-145)  L


 


Potassium Level


  3.5 MMOL/L


(3.5-5.1)


 


Chloride Level


  106 MMOL/L


()


 


Carbon Dioxide Level


  19 MMOL/L


(21-32)  L


 


Anion Gap


  10 mmol/L


(5-15)


 


Blood Urea Nitrogen


  26 mg/dL


(7-18)  H


 


Creatinine


  1.9 MG/DL


(0.55-1.30)  H


 


Estimat Glomerular Filtration


Rate 27.2 mL/min


(>60)


 


Glucose Level


  114 MG/DL


()  H


 


Uric Acid


  4.4 MG/DL


(2.6-7.2)


 


Calcium Level


  8.1 MG/DL


(8.5-10.1)  L


 


Phosphorus Level


  4.9 MG/DL


(2.5-4.9)


 


Magnesium Level


  1.7 MG/DL


(1.8-2.4)  L


 


Total Bilirubin


  0.3 MG/DL


(0.2-1.0)


 


Aspartate Amino Transf


(AST/SGOT) 20 U/L (15-37)


 


 


Alanine Aminotransferase


(ALT/SGPT) 11 U/L (12-78)


L


 


Alkaline Phosphatase


  61 U/L


()


 


C-Reactive Protein,


Quantitative 10.7 mg/dL


(0.00-0.90)  H


 


Pro-B-Type Natriuretic Peptide


  55812 pg/mL


(0-125)  H


 


Total Protein


  4.8 G/DL


(6.4-8.2)  L


 


Albumin


  1.2 G/DL


(3.4-5.0)  L


 


Globulin 3.6 g/dL  


 


Albumin/Globulin Ratio


  0.3 (1.0-2.7)


L

















Chano Cherry MD Sep 1, 2020 11:06

## 2020-09-01 NOTE — NUR
NURSE NOTES:

Patient cleaned and bathed. BM at this time with liquid consistency. Medications given. 
Feeding tubing changed. Gtube flushed and patent.

## 2020-09-01 NOTE — NEPHROLOGY PROGRESS NOTE
Assessment/Plan


Problem List:  


(1) DAVID (acute kidney injury)


(2) Respiratory failure requiring intubation


(3) Down's syndrome


(4) Seizure disorder


(5) Hypothyroidism


Assessment





Acute renal failure, likely due to hypotension


Acute respiratory distress, hypoxia


Seizure disorder


Hypothyroidism


Down syndrome


Full code











Fluid challenge with IV fluids and albumin


Midodrine for BP above 100 systolic


Check TSH level


Check


Correct level


Monitor renal parameters


Urine studies


Per orders


Plan


September 1: Requires less pressors.  Albumin bolus given.  1 dose of Lasix IV 

ordered as the patient severely edematous.  Patient serum albumin is very low.  

Continue per consultants.


August 31: Continues to be intubated.  Labs reviewed.  Serum creatinine 1.9 

unchanged.  Blood pressure more stable.  Off 1 of the pressors.  Continue to 

monitor renal parameters.  Continue per consultants.  Patient now on 

hydrocortisone 100 mg every 8 hours.  Will decrease IV fluid.  Normal saline 

down to 50 cc an hour.


August 30: Intubated.  Labs reviewed.  Creatinine 1.9 unchanged.  Continue same 

treatment plan.  Per consultants.  Overall poor prognosis since the patient 

remains on pressors and her pulmonary status is worsening.


August 29: Remains intubated.  Labs reviewed.  Creatinine 1.9.  Blood pressure 

systolic 90s.  Continue per consultants.


August 28: Remains intubated.  Labs reviewed.  Serum creatinine lower to 2.  

Vancomycin level lower.  Remains hypotensive on pressors.  Will increase 

midodrine to 10 mg every 8 hours.  Continue per consultants.  Continue to 

monitor renal parameters.


August 27: Patient now in ICU.  Intubated.  On pressors.  Labs reviewed.  Will 

increase midodrine.  Aim to keep blood pressure over 100 systolic.  Will give 

albumin bolus.  Will check vancomycin level which was elevated when checked 

previously on August 24.  Will monitor renal parameters.  Continue per 

consultants.





Subjective


ROS Limited/Unobtainable:  Yes





Objective


Objective





Last 24 Hour Vital Signs








  Date Time  Temp Pulse Resp B/P (MAP) Pulse Ox O2 Delivery O2 Flow Rate FiO2


 


9/1/20 15:02  71 30     60


 


9/1/20 13:00  77 29 93/53 (66) 96   


 


9/1/20 12:32  73 30  98 Mechanical Ventilator  60





  75 30     60


 


9/1/20 12:30  74 29 84/47 (59) 96   


 


9/1/20 12:00  70 28 94/60 (71) 95   


 


9/1/20 12:00  70      


 


9/1/20 11:30  61 30 112/44 (66) 97   


 


9/1/20 11:15  62 30 105/41 (62) 96   


 


9/1/20 11:00  63 30 100/52 (68) 97   


 


9/1/20 10:54  65 30     60


 


9/1/20 10:45  70 30 108/49 (68) 97   


 


9/1/20 10:30  66 30 97/46 (63) 97   


 


9/1/20 10:15  69 30 96/44 (61) 97   


 


9/1/20 10:00  71 30 96/50 (65) 96   


 


9/1/20 10:00    96/50    


 


9/1/20 09:45    103/90    


 


9/1/20 09:45  82 25 103/90 (94) 95   


 


9/1/20 09:30    116/56    


 


9/1/20 09:30  75 30 103/51 (68) 96   


 


9/1/20 09:15  82 27 116/56 (76) 91   


 


9/1/20 09:14  73 30     60


 


9/1/20 09:00  81 26 130/63 (85) 93   


 


9/1/20 09:00    130/63    


 


9/1/20 08:45  70 30 123/47 (72) 96   


 


9/1/20 08:45    123/47    


 


9/1/20 08:30  73 30 130/67 (88) 96   


 


9/1/20 08:00  78      


 


9/1/20 08:00        60


 


9/1/20 08:00 97.7 75 30 115/53 (73) 96   


 


9/1/20 08:00    115/53    


 


9/1/20 08:00      Mechanical Ventilator  





      Mechanical Ventilator  


 


9/1/20 07:00  82 30  99 Mechanical Ventilator  60





  79 30     60


 


9/1/20 07:00    120/59    


 


9/1/20 07:00  80 30 120/59 (79) 99   


 


9/1/20 06:30  73 30 126/52 (76) 96   


 


9/1/20 06:15  85 30 106/56 (73) 94   


 


9/1/20 06:00    106/56    


 


9/1/20 06:00  86 29 95/67 (76) 96   


 


9/1/20 05:48  99 28 87/65 (72) 87   


 


9/1/20 05:45  87 24 66/44 (51) 89   


 


9/1/20 05:30  82 25 102/79 (87) 91   


 


9/1/20 05:29  87 30     60


 


9/1/20 05:00    136/60    


 


9/1/20 05:00  69 30 113/62 (79) 94   


 


9/1/20 04:30  70 30 130/71 (90) 94   


 


9/1/20 04:00    119/74    


 


9/1/20 04:00 98.8 86 30 122/106 (111) 95   


 


9/1/20 04:00  72      


 


9/1/20 04:00      Mechanical Ventilator  





      Mechanical Ventilator  


 


9/1/20 04:00        60


 


9/1/20 03:30  77 30     60


 


9/1/20 03:30  82 29 136/69 (91) 95   


 


9/1/20 03:00  84 29 133/73 (93) 94   


 


9/1/20 03:00    100/60    


 


9/1/20 02:44    80/45    


 


9/1/20 02:30  75 26 128/90 (103) 94   


 


9/1/20 02:00  79 30 71/36 (48) 94   


 


9/1/20 02:00    75/21    


 


9/1/20 01:30  84 29 141/113 (122) 93   


 


9/1/20 01:30  79 30     60


 


9/1/20 01:00  79 30 120/64 (82) 95   


 


9/1/20 01:00    129/58    


 


9/1/20 00:30  76 30 120/54 (76) 96   


 


9/1/20 00:00        60


 


9/1/20 00:00      Mechanical Ventilator  





      Mechanical Ventilator  


 


9/1/20 00:00    122/56    


 


9/1/20 00:00 98.4 83 30 118/47 (70) 96   


 


9/1/20 00:00  81      


 


8/31/20 23:30  82 30 107/69 (82) 95   


 


8/31/20 23:24  93 30     60


 


8/31/20 23:15  82 29 115/60 (78) 94   


 


8/31/20 23:00  85 27 104/19 (47) 93   


 


8/31/20 23:00    104/55    


 


8/31/20 22:30  78 29 123/60 (81) 96   


 


8/31/20 22:15  79 30 112/53 (72) 96   


 


8/31/20 22:00  75 30 115/49 (71) 96   


 


8/31/20 22:00    115/49    


 


8/31/20 21:45  75 30 113/57 (75) 95   


 


8/31/20 21:30  88 29 103/48 (66) 93   


 


8/31/20 21:29  84 30     60


 


8/31/20 21:15  90 28 106/60 (75) 92   


 


8/31/20 21:00    108/50    


 


8/31/20 21:00  80 30 108/50 (69) 94   


 


8/31/20 20:45  90 26 111/74 (86) 90   


 


8/31/20 20:30  86 28 102/59 (73) 94   


 


8/31/20 20:17  87 27 108/57 (74) 93   


 


8/31/20 20:00 98.8 81 30 109/45 (66) 95   


 


8/31/20 20:00  86      


 


8/31/20 20:00    109/45    


 


8/31/20 20:00      Mechanical Ventilator  





      Mechanical Ventilator  


 


8/31/20 19:45  85 30 113/60 (77) 95   


 


8/31/20 19:30  86 29 105/62 (76) 94   


 


8/31/20 19:30  87 30  94 Mechanical Ventilator  60





  90 30     60


 


8/31/20 19:00  72 30 103/53 (70) 96   


 


8/31/20 19:00    103/53    


 


8/31/20 18:30  74 30 105/57 (73) 95   


 


8/31/20 18:15  82 28 110/65 (80) 95   


 


8/31/20 18:00    110/55    


 


8/31/20 18:00  83 30 110/55 (73) 94   


 


8/31/20 17:45  75 29 119/62 (81) 97   


 


8/31/20 17:30  73 30 122/63 (82) 97   


 


8/31/20 17:15    119/61    


 


8/31/20 17:15 97.5 74 30 119/61 (80) 97   


 


8/31/20 17:00      Mechanical Ventilator  





      Mechanical Ventilator  


 


8/31/20 17:00    111/61    


 


8/31/20 17:00        60


 


8/31/20 17:00  74 30 111/64 (80) 96   


 


8/31/20 16:41  84 30     60


 


8/31/20 16:30  84 29 116/59 (78) 96   


 


8/31/20 16:00  93      


 


8/31/20 16:00  83 29 123/89 (100) 97   


 


8/31/20 16:00    123/89    


 


8/31/20 16:00        100


 


8/31/20 16:00      Mechanical Ventilator  





      Mechanical Ventilator  

















Intake and Output  


 


 8/31/20 9/1/20





 19:00 07:00


 


Intake Total 1822.0000 ml 1469.37 ml


 


Output Total 550 ml 670 ml


 


Balance 1272.0000 ml 799.37 ml


 


  


 


Free Water  90 ml


 


IV Total 1622.0000 ml 1139.37 ml


 


Tube Feeding 200 ml 240 ml


 


Output Urine Total 550 ml 670 ml


 


# Bowel Movements 1 5








Laboratory Tests


9/1/20 04:00: 


White Blood Count 13.5H, Red Blood Count 2.83L, Hemoglobin 9.7L, Hematocrit 

29.2L, Mean Corpuscular Volume 103H, Mean Corpuscular Hemoglobin 34.2H, Mean 

Corpuscular Hemoglobin Concent 33.2, Red Cell Distribution Width 13.4, Platelet 

Count 99L, Mean Platelet Volume 8.2, Neutrophils (%) (Auto) , Lymphocytes (%) (

Auto) , Monocytes (%) (Auto) , Eosinophils (%) (Auto) , Basophils (%) (Auto) , 

Differential Total Cells Counted 100, Neutrophils % (Manual) 84H, Lymphocytes % 

(Manual) 10L, Monocytes % (Manual) 6, Eosinophils % (Manual) 0, Basophils % (

Manual) 0, Band Neutrophils 0, Platelet Estimate DecreasedL, Platelet 

Morphology Normal, Macrocytosis 1+, Erythrocyte Sedimentation Rate 68H, Sodium 

Level 135L, Potassium Level 3.5, Chloride Level 106, Carbon Dioxide Level 19L, 

Anion Gap 10, Blood Urea Nitrogen 26H, Creatinine 1.9H, Estimat Glomerular 

Filtration Rate 27.2, Glucose Level 114H, Uric Acid 4.4, Calcium Level 8.1L, 

Phosphorus Level 4.9, Magnesium Level 1.7L, Total Bilirubin 0.3, Aspartate 

Amino Transf (AST/SGOT) 20, Alanine Aminotransferase (ALT/SGPT) 11L, Alkaline 

Phosphatase 61, C-Reactive Protein, Quantitative 10.7H, Pro-B-Type Natriuretic 

Peptide 23119A, Total Protein 4.8L, Albumin 1.2L, Globulin 3.6, Albumin/

Globulin Ratio 0.3L


Height (Feet):  5


Height (Inches):  3.00


Weight (Pounds):  172


General Appearance:  no apparent distress


EENT:  other - On ventilator


Cardiovascular:  normal rate


Respiratory/Chest:  decreased breath sounds


Abdomen:  distended











Lam Nino MD Sep 1, 2020 15:36

## 2020-09-01 NOTE — NUR
NURSE NOTES:

Received pt  awake, opened eyes to tactile stimulation, (more awake at this time), orally 
intubated on ac mode SR on the monitor, Bp been supported with Levophed drip at 4mcg/min, 
infusing to CARLOS PICC , site with drsg dry and intact. will continue to monitor.

## 2020-09-01 NOTE — CARDIOLOGY PROGRESS NOTE
Assessment/Plan


Assessment/Plan


sepsis


respiratory failure 


ards 


renal insuf 


bacteremia


abn cardiac enzyme due to demand 








covid isolation removed as covid -x3


min trop abn 


ekg lat one form 8/22 reviewed non ischemic 


echo preformed 8/25 nl lv function personally reviewed  


pressor as need 


vent support 


abx 


noted recommendation for possible thoracentesis 


tele sinus now 


wbc improved 


renal insuf not seems to have changed





Subjective


ROS Limited/Unobtainable:  Yes


Subjective


on  fabiola not communicative





Objective





Last 24 Hour Vital Signs








  Date Time  Temp Pulse Resp B/P (MAP) Pulse Ox O2 Delivery O2 Flow Rate FiO2


 


9/1/20 13:00  77 29 93/53 (66) 96   


 


9/1/20 12:32  73 30  98 Mechanical Ventilator  60





  75 30     60


 


9/1/20 12:30  74 29 84/47 (59) 96   


 


9/1/20 12:00  70 28 94/60 (71) 95   


 


9/1/20 12:00  70      


 


9/1/20 11:30  61 30 112/44 (66) 97   


 


9/1/20 11:15  62 30 105/41 (62) 96   


 


9/1/20 11:00  63 30 100/52 (68) 97   


 


9/1/20 10:54  65 30     60


 


9/1/20 10:45  70 30 108/49 (68) 97   


 


9/1/20 10:30  66 30 97/46 (63) 97   


 


9/1/20 10:15  69 30 96/44 (61) 97   


 


9/1/20 10:00  71 30 96/50 (65) 96   


 


9/1/20 10:00    96/50    


 


9/1/20 09:45    103/90    


 


9/1/20 09:45  82 25 103/90 (94) 95   


 


9/1/20 09:30    116/56    


 


9/1/20 09:30  75 30 103/51 (68) 96   


 


9/1/20 09:15  82 27 116/56 (76) 91   


 


9/1/20 09:14  73 30     60


 


9/1/20 09:00  81 26 130/63 (85) 93   


 


9/1/20 09:00    130/63    


 


9/1/20 08:45  70 30 123/47 (72) 96   


 


9/1/20 08:45    123/47    


 


9/1/20 08:30  73 30 130/67 (88) 96   


 


9/1/20 08:00  78      


 


9/1/20 08:00        60


 


9/1/20 08:00 97.7 75 30 115/53 (73) 96   


 


9/1/20 08:00    115/53    


 


9/1/20 08:00      Mechanical Ventilator  





      Mechanical Ventilator  


 


9/1/20 07:00  82 30  99 Mechanical Ventilator  60





  79 30     60


 


9/1/20 07:00    120/59    


 


9/1/20 07:00  80 30 120/59 (79) 99   


 


9/1/20 06:30  73 30 126/52 (76) 96   


 


9/1/20 06:15  85 30 106/56 (73) 94   


 


9/1/20 06:00    106/56    


 


9/1/20 06:00  86 29 95/67 (76) 96   


 


9/1/20 05:48  99 28 87/65 (72) 87   


 


9/1/20 05:45  87 24 66/44 (51) 89   


 


9/1/20 05:30  82 25 102/79 (87) 91   


 


9/1/20 05:29  87 30     60


 


9/1/20 05:00    136/60    


 


9/1/20 05:00  69 30 113/62 (79) 94   


 


9/1/20 04:30  70 30 130/71 (90) 94   


 


9/1/20 04:00    119/74    


 


9/1/20 04:00 98.8 86 30 122/106 (111) 95   


 


9/1/20 04:00  72      


 


9/1/20 04:00      Mechanical Ventilator  





      Mechanical Ventilator  


 


9/1/20 04:00        60


 


9/1/20 03:30  77 30     60


 


9/1/20 03:30  82 29 136/69 (91) 95   


 


9/1/20 03:00  84 29 133/73 (93) 94   


 


9/1/20 03:00    100/60    


 


9/1/20 02:44    80/45    


 


9/1/20 02:30  75 26 128/90 (103) 94   


 


9/1/20 02:00  79 30 71/36 (48) 94   


 


9/1/20 02:00    75/21    


 


9/1/20 01:30  84 29 141/113 (122) 93   


 


9/1/20 01:30  79 30     60


 


9/1/20 01:00  79 30 120/64 (82) 95   


 


9/1/20 01:00    129/58    


 


9/1/20 00:30  76 30 120/54 (76) 96   


 


9/1/20 00:00        60


 


9/1/20 00:00      Mechanical Ventilator  





      Mechanical Ventilator  


 


9/1/20 00:00    122/56    


 


9/1/20 00:00 98.4 83 30 118/47 (70) 96   


 


9/1/20 00:00  81      


 


8/31/20 23:30  82 30 107/69 (82) 95   


 


8/31/20 23:24  93 30     60


 


8/31/20 23:15  82 29 115/60 (78) 94   


 


8/31/20 23:00  85 27 104/19 (47) 93   


 


8/31/20 23:00    104/55    


 


8/31/20 22:30  78 29 123/60 (81) 96   


 


8/31/20 22:15  79 30 112/53 (72) 96   


 


8/31/20 22:00  75 30 115/49 (71) 96   


 


8/31/20 22:00    115/49    


 


8/31/20 21:45  75 30 113/57 (75) 95   


 


8/31/20 21:30  88 29 103/48 (66) 93   


 


8/31/20 21:29  84 30     60


 


8/31/20 21:15  90 28 106/60 (75) 92   


 


8/31/20 21:00    108/50    


 


8/31/20 21:00  80 30 108/50 (69) 94   


 


8/31/20 20:45  90 26 111/74 (86) 90   


 


8/31/20 20:30  86 28 102/59 (73) 94   


 


8/31/20 20:17  87 27 108/57 (74) 93   


 


8/31/20 20:00 98.8 81 30 109/45 (66) 95   


 


8/31/20 20:00  86      


 


8/31/20 20:00    109/45    


 


8/31/20 20:00      Mechanical Ventilator  





      Mechanical Ventilator  


 


8/31/20 19:45  85 30 113/60 (77) 95   


 


8/31/20 19:30  86 29 105/62 (76) 94   


 


8/31/20 19:30  87 30  94 Mechanical Ventilator  60





  90 30     60


 


8/31/20 19:00  72 30 103/53 (70) 96   


 


8/31/20 19:00    103/53    


 


8/31/20 18:30  74 30 105/57 (73) 95   


 


8/31/20 18:15  82 28 110/65 (80) 95   


 


8/31/20 18:00    110/55    


 


8/31/20 18:00  83 30 110/55 (73) 94   


 


8/31/20 17:45  75 29 119/62 (81) 97   


 


8/31/20 17:30  73 30 122/63 (82) 97   


 


8/31/20 17:15    119/61    


 


8/31/20 17:15 97.5 74 30 119/61 (80) 97   


 


8/31/20 17:00      Mechanical Ventilator  





      Mechanical Ventilator  


 


8/31/20 17:00    111/61    


 


8/31/20 17:00        60


 


8/31/20 17:00  74 30 111/64 (80) 96   


 


8/31/20 16:41  84 30     60


 


8/31/20 16:30  84 29 116/59 (78) 96   


 


8/31/20 16:00  93      


 


8/31/20 16:00  83 29 123/89 (100) 97   


 


8/31/20 16:00    123/89    


 


8/31/20 16:00        100


 


8/31/20 16:00      Mechanical Ventilator  





      Mechanical Ventilator  


 


8/31/20 15:30  82 30 87/48 (61) 97   


 


8/31/20 15:00  87 29 102/60 (74) 94   


 


8/31/20 15:00    102/60    


 


8/31/20 14:48  81 30     100


 


8/31/20 14:30  79 29 104/68 (80) 100   


 


8/31/20 14:00    93/58    


 


8/31/20 14:00  89 27 93/58 (70) 100   








General Appearance:  no apparent distress, obese, on vent, patient on isolation


Neck:  supple


Cardiovascular:  normal rate


Respiratory/Chest:  crackles/rales


Abdomen:  normal bowel sounds, non tender, soft


Extremities:  no swelling











Intake and Output  


 


 8/31/20 9/1/20





 19:00 07:00


 


Intake Total 1822.0000 ml 1469.37 ml


 


Output Total 550 ml 670 ml


 


Balance 1272.0000 ml 799.37 ml


 


  


 


Free Water  90 ml


 


IV Total 1622.0000 ml 1139.37 ml


 


Tube Feeding 200 ml 240 ml


 


Output Urine Total 550 ml 670 ml


 


# Bowel Movements 1 5











Laboratory Tests








Test


  9/1/20


04:00


 


White Blood Count


  13.5 K/UL


(4.8-10.8)  H


 


Red Blood Count


  2.83 M/UL


(4.20-5.40)  L


 


Hemoglobin


  9.7 G/DL


(12.0-16.0)  L


 


Hematocrit


  29.2 %


(37.0-47.0)  L


 


Mean Corpuscular Volume


  103 FL (80-99)


H


 


Mean Corpuscular Hemoglobin


  34.2 PG


(27.0-31.0)  H


 


Mean Corpuscular Hemoglobin


Concent 33.2 G/DL


(32.0-36.0)


 


Red Cell Distribution Width


  13.4 %


(11.6-14.8)


 


Platelet Count


  99 K/UL


(150-450)  L


 


Mean Platelet Volume


  8.2 FL


(6.5-10.1)


 


Neutrophils (%) (Auto)


  % (45.0-75.0)


 


 


Lymphocytes (%) (Auto)


  % (20.0-45.0)


 


 


Monocytes (%) (Auto)  % (1.0-10.0)  


 


Eosinophils (%) (Auto)  % (0.0-3.0)  


 


Basophils (%) (Auto)  % (0.0-2.0)  


 


Differential Total Cells


Counted 100  


 


 


Neutrophils % (Manual) 84 % (45-75)  H


 


Lymphocytes % (Manual) 10 % (20-45)  L


 


Monocytes % (Manual) 6 % (1-10)  


 


Eosinophils % (Manual) 0 % (0-3)  


 


Basophils % (Manual) 0 % (0-2)  


 


Band Neutrophils 0 % (0-8)  


 


Platelet Estimate Decreased  L


 


Platelet Morphology Normal  


 


Macrocytosis 1+  


 


Erythrocyte Sedimentation Rate


  68 MM/HR


(0-30)  H


 


Sodium Level


  135 MMOL/L


(136-145)  L


 


Potassium Level


  3.5 MMOL/L


(3.5-5.1)


 


Chloride Level


  106 MMOL/L


()


 


Carbon Dioxide Level


  19 MMOL/L


(21-32)  L


 


Anion Gap


  10 mmol/L


(5-15)


 


Blood Urea Nitrogen


  26 mg/dL


(7-18)  H


 


Creatinine


  1.9 MG/DL


(0.55-1.30)  H


 


Estimat Glomerular Filtration


Rate 27.2 mL/min


(>60)


 


Glucose Level


  114 MG/DL


()  H


 


Uric Acid


  4.4 MG/DL


(2.6-7.2)


 


Calcium Level


  8.1 MG/DL


(8.5-10.1)  L


 


Phosphorus Level


  4.9 MG/DL


(2.5-4.9)


 


Magnesium Level


  1.7 MG/DL


(1.8-2.4)  L


 


Total Bilirubin


  0.3 MG/DL


(0.2-1.0)


 


Aspartate Amino Transf


(AST/SGOT) 20 U/L (15-37)


 


 


Alanine Aminotransferase


(ALT/SGPT) 11 U/L (12-78)


L


 


Alkaline Phosphatase


  61 U/L


()


 


C-Reactive Protein,


Quantitative 10.7 mg/dL


(0.00-0.90)  H


 


Pro-B-Type Natriuretic Peptide


  90639 pg/mL


(0-125)  H


 


Total Protein


  4.8 G/DL


(6.4-8.2)  L


 


Albumin


  1.2 G/DL


(3.4-5.0)  L


 


Globulin 3.6 g/dL  


 


Albumin/Globulin Ratio


  0.3 (1.0-2.7)


L

















Constantine Jorgensen MD Sep 1, 2020 13:48

## 2020-09-01 NOTE — INTERNAL MED PROGRESS NOTE
Subjective


Date of Service:  Sep 1, 2020


Physician Name


Sanya Schultz


Attending Physician


Chano Cherry MD





Current Medications








 Medications


  (Trade)  Dose


 Ordered  Sig/Didi


 Route


 PRN Reason  Start Time


 Stop Time Status Last Admin


Dose Admin


 


 Acetaminophen


  (Tylenol)  650 mg  Q4H  PRN


 GT


 FEVER  8/26/20 11:45


 9/25/20 11:44  8/29/20 23:15


 


 


 Albuterol/


 Ipratropium


  (Albuterol/


 Ipratropium)  3 ml  Q4H  PRN


 HHN


 Shortness of Breath  8/29/20 11:30


 9/3/20 11:29   


 


 


 Albuterol/


 Ipratropium


  (Albuterol/


 Ipratropium)  3 ml  TIDRT


 HHN


   8/29/20 13:00


 9/3/20 12:59  9/1/20 12:35


 


 


 Chlorhexidine


 Gluconate


  (Beatrice-Hex 2%)  1 applic  DAILY@2000


 TOPIC


   8/31/20 20:00


 11/29/20 19:59  8/31/20 19:33


 


 


 Clotrimazole


  (Lotrimin)  1 applic  Q12HR


 TOPIC


   8/30/20 13:00


 11/28/20 12:59  9/1/20 09:43


 


 


 Daptomycin 350 mg/


 Sodium Chloride  55 ml @ 


 100 mls/hr  Q48H


 IV


   8/28/20 11:00


 9/5/20 10:59  9/1/20 11:09


 


 


 Dextrose


  (Dextrose 50%)  25 ml  Q30M  PRN


 IV


 Hypoglycemia  8/26/20 11:30


 11/20/20 11:29   


 


 


 Dextrose


  (Dextrose 50%)  50 ml  Q30M  PRN


 IV


 Hypoglycemia  8/26/20 11:30


 11/20/20 11:29   


 


 


 Hydrocortisone


  (Solu-CORTEF)  100 mg  EVERY 8  HOURS


 IV


   8/30/20 14:00


 11/28/20 13:59  9/1/20 15:07


 


 


 Levothyroxine


 Sodium


  (Synthroid)  75 mcg  DAILY


 GT


   8/27/20 09:00


 9/22/20 08:59  9/1/20 09:44


 


 


 Linezolid  300 ml @ 


 300 mls/hr  Q12H


 IVPB


   8/29/20 15:00


 9/5/20 14:59  9/1/20 16:03


 


 


 Meropenem 1 gm/


 Sodium Chloride  55 ml @ 


 110 mls/hr  Q12H


 IVPB


   8/26/20 16:00


 9/5/20 15:59  9/1/20 16:03


 


 


 Micafungin Sodium


 100 mg/Sodium


 Chloride  110 ml @ 


 110 mls/hr  Q24H


 IVPB


   8/27/20 14:00


 9/3/20 13:59  9/1/20 15:07


 


 


 Midodrine


  (Pro-Amatine)  10 mg  Q8HR


 GT


   8/28/20 14:00


 11/26/20 13:59  9/1/20 15:07


 


 


 Norepinephrine


 Bitartrate 16 mg/


 Dextrose  500 ml @ 0


 mls/hr  Q24H


 IV


   8/31/20 07:45


 9/29/20 12:59  9/1/20 02:44


 


 


 Ondansetron HCl


  (Zofran)  4 mg  Q6H  PRN


 IVP


 Nausea & Vomiting  8/26/20 12:00


 9/21/20 11:59   


 


 


 Pantoprazole


  (Protonix)  40 mg  DAILY


 IV


   8/27/20 09:00


 9/22/20 08:59  9/1/20 09:43


 


 


 Phenylephrine HCl


 50 mg/Dextrose  250 ml @ 0


 mls/hr  Q24H  PRN


 IV


 For hypotension  8/29/20 17:45


 9/28/20 17:44  8/31/20 01:49


 


 


 Polyethylene


 Glycol


  (Miralax)  17 gm  DAILYPRN  PRN


 GT


 Constipation  8/26/20 12:00


 9/21/20 11:59   


 


 


 Sodium Chloride  1,000 ml @ 


 50 mls/hr  Q20H


 IV


   8/31/20 09:32


 9/30/20 09:31  9/1/20 06:10


 


 


 Valproic Acid


  (Depakene)  500 mg  EVERY 8  HOURS


 GT


   8/26/20 14:00


 9/21/20 21:59  9/1/20 15:07


 








Allergies:  


Coded Allergies:  


     No Known Allergies (Unverified , 1/8/19)


ROS Limited/Unobtainable:  Yes


Subjective


58 YO F with Down's syndrome admitted with hypoxia.  Now sepsis and pneumonia.  

Cover for Int Phi-DR Hung.  ICU.  Intubated and sedated





Objective





Last Vital Signs








  Date Time  Temp Pulse Resp B/P (MAP) Pulse Ox O2 Delivery O2 Flow Rate FiO2


 


9/1/20 18:01  82 27 116/58 (77) 90   


 


9/1/20 16:00      Mechanical Ventilator  





      Mechanical Ventilator  


 


9/1/20 16:00        60


 


9/1/20 08:00 97.7       


 


8/27/20 00:00       15.0 











Laboratory Tests








Test


  9/1/20


04:00


 


White Blood Count


  13.5 K/UL


(4.8-10.8)  H


 


Red Blood Count


  2.83 M/UL


(4.20-5.40)  L


 


Hemoglobin


  9.7 G/DL


(12.0-16.0)  L


 


Hematocrit


  29.2 %


(37.0-47.0)  L


 


Mean Corpuscular Volume


  103 FL (80-99)


H


 


Mean Corpuscular Hemoglobin


  34.2 PG


(27.0-31.0)  H


 


Mean Corpuscular Hemoglobin


Concent 33.2 G/DL


(32.0-36.0)


 


Red Cell Distribution Width


  13.4 %


(11.6-14.8)


 


Platelet Count


  99 K/UL


(150-450)  L


 


Mean Platelet Volume


  8.2 FL


(6.5-10.1)


 


Neutrophils (%) (Auto)


  % (45.0-75.0)


 


 


Lymphocytes (%) (Auto)


  % (20.0-45.0)


 


 


Monocytes (%) (Auto)  % (1.0-10.0)  


 


Eosinophils (%) (Auto)  % (0.0-3.0)  


 


Basophils (%) (Auto)  % (0.0-2.0)  


 


Differential Total Cells


Counted 100  


 


 


Neutrophils % (Manual) 84 % (45-75)  H


 


Lymphocytes % (Manual) 10 % (20-45)  L


 


Monocytes % (Manual) 6 % (1-10)  


 


Eosinophils % (Manual) 0 % (0-3)  


 


Basophils % (Manual) 0 % (0-2)  


 


Band Neutrophils 0 % (0-8)  


 


Platelet Estimate Decreased  L


 


Platelet Morphology Normal  


 


Macrocytosis 1+  


 


Erythrocyte Sedimentation Rate


  68 MM/HR


(0-30)  H


 


Sodium Level


  135 MMOL/L


(136-145)  L


 


Potassium Level


  3.5 MMOL/L


(3.5-5.1)


 


Chloride Level


  106 MMOL/L


()


 


Carbon Dioxide Level


  19 MMOL/L


(21-32)  L


 


Anion Gap


  10 mmol/L


(5-15)


 


Blood Urea Nitrogen


  26 mg/dL


(7-18)  H


 


Creatinine


  1.9 MG/DL


(0.55-1.30)  H


 


Estimat Glomerular Filtration


Rate 27.2 mL/min


(>60)


 


Glucose Level


  114 MG/DL


()  H


 


Uric Acid


  4.4 MG/DL


(2.6-7.2)


 


Calcium Level


  8.1 MG/DL


(8.5-10.1)  L


 


Phosphorus Level


  4.9 MG/DL


(2.5-4.9)


 


Magnesium Level


  1.7 MG/DL


(1.8-2.4)  L


 


Total Bilirubin


  0.3 MG/DL


(0.2-1.0)


 


Aspartate Amino Transf


(AST/SGOT) 20 U/L (15-37)


 


 


Alanine Aminotransferase


(ALT/SGPT) 11 U/L (12-78)


L


 


Alkaline Phosphatase


  61 U/L


()


 


C-Reactive Protein,


Quantitative 10.7 mg/dL


(0.00-0.90)  H


 


Pro-B-Type Natriuretic Peptide


  55316 pg/mL


(0-125)  H


 


Total Protein


  4.8 G/DL


(6.4-8.2)  L


 


Albumin


  1.2 G/DL


(3.4-5.0)  L


 


Globulin 3.6 g/dL  


 


Albumin/Globulin Ratio


  0.3 (1.0-2.7)


L

















Intake and Output  


 


 8/31/20 9/1/20





 19:00 07:00


 


Intake Total 1822.0000 ml 1469.37 ml


 


Output Total 550 ml 670 ml


 


Balance 1272.0000 ml 799.37 ml


 


  


 


Free Water  90 ml


 


IV Total 1622.0000 ml 1139.37 ml


 


Tube Feeding 200 ml 240 ml


 


Output Urine Total 550 ml 670 ml


 


# Bowel Movements 1 5








Objective


General Appearance:  WD/WN, no apparent distress, alert


EENT:  PERRL/EOMI, normal ENT inspection


Neck:  non-tender, normal alignment, supple, normal inspection


Cardiovascular:  normal peripheral pulses, normal rate, regular rhythm, no 

gallop/murmur, no JVD


Respiratory/Chest:  Mech vent; decreased breath sounds, crackles/rales, rhonchi 

- bilaterally, expiratory wheezing


Abdomen:  normal bowel sounds, non tender, soft, no organomegaly, no mass


Extremities:  normal range of motion


Neurologic:  CNs II-XII grossly normal


Skin:  normal pigmentation, warm/dry





Assessment/Plan


Problem List:  


(1) HCAP (healthcare-associated pneumonia)


Assessment & Plan:  Strep Group G.  Continue daptomycin per ID=Dr Gomes.  

Pulmonary/Critical care=DR Cherry.  COVID NEG





(2) Sepsis


Assessment & Plan:  Staph haemolyticus.  Continue daptomycin and ceftriaxone 

per ID=Dr Gomes





(3) Down's syndrome


(4) Dysphagia


Assessment & Plan:  S/P PEG





(5) Seizure disorder


Assessment & Plan:  Continue keppra and depakote





(6) Hypothyroidism


Assessment & Plan:  Continue synthroid





(7) Acute respiratory failure


Assessment & Plan:  Pulmonary = Dr Cherry; [facility]














Sanya Schultz MD Sep 1, 2020 18:31

## 2020-09-01 NOTE — NUR
NURSE NOTES:

Patient stable at this time. No s/sx of pain or distress. More awake now. Makes eye contact 
and tracks. Trying to cough up and push out bite block. Bite block resecured again. Will 
continue to monitor.

## 2020-09-01 NOTE — NUR
NURSE NOTES:

Patient received from Lucie TRAYLOR. Patient stable AOx0 with no s/sx of pain or distress. RR 
even and unlabored through ETT tube 7.5 lip line 22. Vent settings AC 30 500mL 60% P5. 
Cardiac sounds benign. BL lung sounds rhoncus. Pitting edema +2 on BL hands and feet with 
weak pulses to dorsalis pedis. Valle draining well to gravity. LT UA PICC line dressing dry 
and intact, patent. IV fluids and Levophed at 10mcg infusing. Bowel sounds hyperactive. 
Gastric tube in place with no residual. Side rails upx2 call light within reach, bed low and 
locked. Will continue to monitor.

## 2020-09-01 NOTE — NUR
NURSE HAND-OFF REPORT: 



Latest Vital Signs: Temperature 98.8 , Pulse 82 , B/P 120 /59 , Respiratory Rate 30 , O2 SAT 
96 , Mechanical Ventilator, O2 Flow Rate 15.0 .  

Vital Sign Comment: 



EKG Rhythm: Sinus Rhythm

Rhythm change?: N 

MD Notified?: -

MD Response: 



Latest Momin Fall Score: 50  

Fall Risk: High Risk 

Safety Measures: Call light Within Reach, Bed Alarm Zone 2, Side Rails Side Rails x3, Bed 
position Low and Locked.

Fall Precautions: 

Yellow Socks

Door Sign

Patient Fall Education



Report given to Miranda CHANEL RN.

## 2020-09-01 NOTE — NUR
CASE MANAGEMENT: REVIEW





SI: PNA . DOWN SYNDROME . SEIZURE DISORDER

T 97.7 HR 72 RR 30 BP 91/45 SAT 93% MECH VENT FIO2 60

WBC 13.5 H/H 9.7/29.2  CR 1.9 BNP 82605 

 





IS: ALBUMIN IV X1

LASIX IV X1

MAG SULFATE IV X1

ZYVOX IV Q12HR

LEVOPHED IV Q24HR

DAPTOMYCIN IV Q24HR

MICAFUNGIN IV Q24HR 

MEROPENEM IV Q12HR 









***ICU STATUS***

DCP: PATIENT IS FROM HOME [facility]

## 2020-09-01 NOTE — NUR
NURSE NOTES:

Tolerating Jevity 1.2 at 20cc/hr with acceptable residuals. HOb kept elevated, on aspiration 
precaution.

## 2020-09-01 NOTE — NUR
NURSE HAND-OFF REPORT: 



Latest Vital Signs: Temperature 99.0 , Pulse 73 , B/P 107 /45 , Respiratory Rate 30 , O2 SAT 
93 , Mechanical Ventilator, O2 Flow Rate 15.0 .  

Vital Sign Comment: STABLE



EKG Rhythm: Sinus Rhythm

Rhythm change?: N 

MD Notified?: -

MD Response: 



Latest Momin Fall Score: 50  

Fall Risk: High Risk 

Safety Measures: Call light Within Reach, Bed Alarm Zone 2, Side Rails Side Rails x3, Bed 
position Low and Locked.

Fall Precautions: 

Yellow Socks

Door Sign

Patient Fall Education



Report given to Lucie TRAYLOR. Patient stable. Plan of care endorsed. Patient now on 4mcg of 
Levophed.

## 2020-09-01 NOTE — INFECTIOUS DISEASES PROG NOTE
Assessment/Plan








ASSESSMENT:


sp code blue 8/26





Septic Shock; pressors requirements improving


Fever, SP


Leukocytosis; improving


  -8/26 u/a no pyuria





Pneumonia.- COVID 19 neg x3


   Acute hypoxic resp failure on VM> NRB 15l 100%; hypoxic on ABG> now VDRF 8/26

- Fio2 80% >100% 8/28> 60% 9/1 8/31 CXR: Extensive bilateral interstitial and airspace disease 

appears similar to the prior exam. Moderate to large bilateral pleural 

effusions appear unchanged.


   8/28 CXR: Increasing left upper lobe dense consolidation and likely 

increasing bilateral pleural fluid. Persistent diffuse dense consolidation 

elsewhere


            -8/26 sp cx normal resp lilliam


             -8/25 CXR: Increased atelectasis of the right lung, since prior 

exam of 3 days earlier. New or increased right pleural effusion. Increased left 

basilar consolidation and/or pleural fluid 


          -COVID Rapid PCR neg 8/23, 8/23, 8/26


          -8/22 spc x Group G strep


          -8/22 CXR:   Reduced lung volumes.  Patchy bilateral predominantly 

interstitial  pulmonary opacities.  Could be from edema and/or pneumonia.  

There is a broader differential.





Persistent, high grade bacteremia-  r/o endocarditis


     -8/22 Bcx 4/4 sets S. haemolyticus; 8/23 Bcx 3/4 S/ epi; 8/27 Bcx 1.4 S. 

warnerri; 8/29 Bcx NTD


     -2d echo: no vegetaions seen





          ua/ wbc 10-15, nit neg, leuk +1; ucx Neg





DAVID; 


 -supratherapeutic vanco levels





-Seizure disorder.


- Hypothyroidism.


- Down syndrome.





History of PEG tube placement.


[facility] resident





PLAN:


Cont linezolid #4 given persistent GPC bacteremia (double coverage, though not 

ideal for bacteremia) and pneumonia (dapto doesn't cover for pneumonia)


  -monitor plts





Cont Daptomycin #8(abx d #11) for GPC bacteremia in view of DAVID [monitor CK, 

was 51 on 8/26]


Cont Meropenem#7 (abx d #11) given resp decompensation


Cont Micafungin #6/7





f/u Repeat BCx given persistent bacteremia


CT chest when stable enough, not currently given on FiO2 100% and pressors


If pleural effusions, should be tapped to r/o empyema, send for bacterial cx as 

well as cell count and diff


F/u cx of exudate around G-tube


Trend GIB (black stools)





   8/28 SP Azithromycin #7/7


   8/26 SP Ceftriaxone #2


   8/25 SP IV Vancomycin #4, Zosyn #4


   8/22 SP Cefepime x1, Flagyl x1





- Monitor CBC, BMP.


-f/u Cocci ab, CrAg


.f/u  Repeat cx


- COVID neg x3; ok to dc isolation as afebrile >48hrs, alternative diagnosis


- Monitor chest x-ray.


- Monitor the patient's clinical course and labs.  Based on those, we will do 

further recommendation.





Thank you, Dr. Cherry, for allowing me to participate in the care of


this patient.  I will follow the patient with you at this


hospitalization.








Discussed with RN





Subjective


Allergies:  


Coded Allergies:  


     No Known Allergies (Unverified , 1/8/19)


afebrile >48hrs


remains intubated, Fio2 down to 60%


levo down to 4 and felix down to 20





Objective





Last 24 Hour Vital Signs








  Date Time  Temp Pulse Resp B/P (MAP) Pulse Ox O2 Delivery O2 Flow Rate FiO2


 


9/1/20 10:54  65 30     60


 


9/1/20 10:15  69 30 96/44 (61) 97   


 


9/1/20 10:00  71 30 96/50 (65) 96   


 


9/1/20 10:00    96/50    


 


9/1/20 09:45    103/90    


 


9/1/20 09:45  82 25 103/90 (94) 95   


 


9/1/20 09:30    116/56    


 


9/1/20 09:30  75 30 103/51 (68) 96   


 


9/1/20 09:15  82 27 116/56 (76) 91   


 


9/1/20 09:14  73 30     60


 


9/1/20 09:00  81 26 130/63 (85) 93   


 


9/1/20 09:00    130/63    


 


9/1/20 08:45  70 30 123/47 (72) 96   


 


9/1/20 08:45    123/47    


 


9/1/20 08:30  73 30 130/67 (88) 96   


 


9/1/20 08:00  78      


 


9/1/20 08:00        60


 


9/1/20 08:00 97.7 75 30 115/53 (73) 96   


 


9/1/20 08:00    115/53    


 


9/1/20 08:00      Mechanical Ventilator  





      Mechanical Ventilator  


 


9/1/20 07:00  82 30  99 Mechanical Ventilator  60





  79 30     60


 


9/1/20 07:00    120/59    


 


9/1/20 07:00  80 30 120/59 (79) 99   


 


9/1/20 06:30  73 30 126/52 (76) 96   


 


9/1/20 06:15  85 30 106/56 (73) 94   


 


9/1/20 06:00    106/56    


 


9/1/20 06:00  86 29 95/67 (76) 96   


 


9/1/20 05:48  99 28 87/65 (72) 87   


 


9/1/20 05:45  87 24 66/44 (51) 89   


 


9/1/20 05:30  82 25 102/79 (87) 91   


 


9/1/20 05:29  87 30     60


 


9/1/20 05:00    136/60    


 


9/1/20 05:00  69 30 113/62 (79) 94   


 


9/1/20 04:30  70 30 130/71 (90) 94   


 


9/1/20 04:00    119/74    


 


9/1/20 04:00 98.8 86 30 122/106 (111) 95   


 


9/1/20 04:00  72      


 


9/1/20 04:00      Mechanical Ventilator  





      Mechanical Ventilator  


 


9/1/20 04:00        60


 


9/1/20 03:30  77 30     60


 


9/1/20 03:30  82 29 136/69 (91) 95   


 


9/1/20 03:00  84 29 133/73 (93) 94   


 


9/1/20 03:00    100/60    


 


9/1/20 02:44    80/45    


 


9/1/20 02:30  75 26 128/90 (103) 94   


 


9/1/20 02:00  79 30 71/36 (48) 94   


 


9/1/20 02:00    75/21    


 


9/1/20 01:30  84 29 141/113 (122) 93   


 


9/1/20 01:30  79 30     60


 


9/1/20 01:00  79 30 120/64 (82) 95   


 


9/1/20 01:00    129/58    


 


9/1/20 00:30  76 30 120/54 (76) 96   


 


9/1/20 00:00        60


 


9/1/20 00:00      Mechanical Ventilator  





      Mechanical Ventilator  


 


9/1/20 00:00    122/56    


 


9/1/20 00:00 98.4 83 30 118/47 (70) 96   


 


9/1/20 00:00  81      


 


8/31/20 23:30  82 30 107/69 (82) 95   


 


8/31/20 23:24  93 30     60


 


8/31/20 23:15  82 29 115/60 (78) 94   


 


8/31/20 23:00  85 27 104/19 (47) 93   


 


8/31/20 23:00    104/55    


 


8/31/20 22:30  78 29 123/60 (81) 96   


 


8/31/20 22:15  79 30 112/53 (72) 96   


 


8/31/20 22:00  75 30 115/49 (71) 96   


 


8/31/20 22:00    115/49    


 


8/31/20 21:45  75 30 113/57 (75) 95   


 


8/31/20 21:30  88 29 103/48 (66) 93   


 


8/31/20 21:29  84 30     60


 


8/31/20 21:15  90 28 106/60 (75) 92   


 


8/31/20 21:00    108/50    


 


8/31/20 21:00  80 30 108/50 (69) 94   


 


8/31/20 20:45  90 26 111/74 (86) 90   


 


8/31/20 20:30  86 28 102/59 (73) 94   


 


8/31/20 20:17  87 27 108/57 (74) 93   


 


8/31/20 20:00 98.8 81 30 109/45 (66) 95   


 


8/31/20 20:00  86      


 


8/31/20 20:00    109/45    


 


8/31/20 20:00      Mechanical Ventilator  





      Mechanical Ventilator  


 


8/31/20 19:45  85 30 113/60 (77) 95   


 


8/31/20 19:30  86 29 105/62 (76) 94   


 


8/31/20 19:30  87 30  94 Mechanical Ventilator  60





  90 30     60


 


8/31/20 19:00  72 30 103/53 (70) 96   


 


8/31/20 19:00    103/53    


 


8/31/20 18:30  74 30 105/57 (73) 95   


 


8/31/20 18:15  82 28 110/65 (80) 95   


 


8/31/20 18:00    110/55    


 


8/31/20 18:00  83 30 110/55 (73) 94   


 


8/31/20 17:45  75 29 119/62 (81) 97   


 


8/31/20 17:30  73 30 122/63 (82) 97   


 


8/31/20 17:15    119/61    


 


8/31/20 17:15 97.5 74 30 119/61 (80) 97   


 


8/31/20 17:00      Mechanical Ventilator  





      Mechanical Ventilator  


 


8/31/20 17:00    111/61    


 


8/31/20 17:00        60


 


8/31/20 17:00  74 30 111/64 (80) 96   


 


8/31/20 16:41  84 30     60


 


8/31/20 16:30  84 29 116/59 (78) 96   


 


8/31/20 16:00  93      


 


8/31/20 16:00  83 29 123/89 (100) 97   


 


8/31/20 16:00    123/89    


 


8/31/20 16:00        100


 


8/31/20 16:00      Mechanical Ventilator  





      Mechanical Ventilator  


 


8/31/20 15:30  82 30 87/48 (61) 97   


 


8/31/20 15:00  87 29 102/60 (74) 94   


 


8/31/20 15:00    102/60    


 


8/31/20 14:48  81 30     100


 


8/31/20 14:30  79 29 104/68 (80) 100   


 


8/31/20 14:00    93/58    


 


8/31/20 14:00  89 27 93/58 (70) 100   


 


8/31/20 13:30  80 29 87/65 (72) 100   


 


8/31/20 13:00  75 30 112/62 (79) 100   


 


8/31/20 13:00    95/65    


 


8/31/20 12:45 98.6 71 29 95/65 (75) 100   


 


8/31/20 12:30  72 29 125/82 (96) 100   


 


8/31/20 12:25  67 30  100 Mechanical Ventilator  100





  65 30     100


 


8/31/20 12:00      Mechanical Ventilator  





      Mechanical Ventilator  


 


8/31/20 12:00    119/70    


 


8/31/20 12:00        100


 


8/31/20 12:00  82      


 


8/31/20 12:00  68 30 119/70 (86) 100   


 


8/31/20 11:45  67 30 110/67 (81) 100   


 


8/31/20 11:45    110/67    








Height (Feet):  5


Height (Inches):  3.00


Weight (Pounds):  172


HEENT:  No pale conjunctivae.  No icterus. ETT in place


NECK:  No lymphadenopathy.


CHEST:  Coarse breathing sounds.


HEART:  S1 and S2.


ABDOMEN:  Soft.  PEG tube in place.


EXTREMITIES:  No cyanosis at this time .


SKIN: no rash





Microbiology








 Date/Time


Source Procedure


Growth Status


 


 


 8/29/20 11:50


Blood Blood Culture - Preliminary


NO GROWTH AFTER 48 HOURS Resulted











Laboratory Tests








Test


  9/1/20


04:00


 


White Blood Count


  13.5 K/UL


(4.8-10.8)  H


 


Red Blood Count


  2.83 M/UL


(4.20-5.40)  L


 


Hemoglobin


  9.7 G/DL


(12.0-16.0)  L


 


Hematocrit


  29.2 %


(37.0-47.0)  L


 


Mean Corpuscular Volume


  103 FL (80-99)


H


 


Mean Corpuscular Hemoglobin


  34.2 PG


(27.0-31.0)  H


 


Mean Corpuscular Hemoglobin


Concent 33.2 G/DL


(32.0-36.0)


 


Red Cell Distribution Width


  13.4 %


(11.6-14.8)


 


Platelet Count


  99 K/UL


(150-450)  L


 


Mean Platelet Volume


  8.2 FL


(6.5-10.1)


 


Neutrophils (%) (Auto)


  % (45.0-75.0)


 


 


Lymphocytes (%) (Auto)


  % (20.0-45.0)


 


 


Monocytes (%) (Auto)  % (1.0-10.0)  


 


Eosinophils (%) (Auto)  % (0.0-3.0)  


 


Basophils (%) (Auto)  % (0.0-2.0)  


 


Differential Total Cells


Counted 100  


 


 


Neutrophils % (Manual) 84 % (45-75)  H


 


Lymphocytes % (Manual) 10 % (20-45)  L


 


Monocytes % (Manual) 6 % (1-10)  


 


Eosinophils % (Manual) 0 % (0-3)  


 


Basophils % (Manual) 0 % (0-2)  


 


Band Neutrophils 0 % (0-8)  


 


Platelet Estimate Decreased  L


 


Platelet Morphology Normal  


 


Macrocytosis 1+  


 


Erythrocyte Sedimentation Rate


  68 MM/HR


(0-30)  H


 


Sodium Level


  135 MMOL/L


(136-145)  L


 


Potassium Level


  3.5 MMOL/L


(3.5-5.1)


 


Chloride Level


  106 MMOL/L


()


 


Carbon Dioxide Level


  19 MMOL/L


(21-32)  L


 


Anion Gap


  10 mmol/L


(5-15)


 


Blood Urea Nitrogen


  26 mg/dL


(7-18)  H


 


Creatinine


  1.9 MG/DL


(0.55-1.30)  H


 


Estimat Glomerular Filtration


Rate 27.2 mL/min


(>60)


 


Glucose Level


  114 MG/DL


()  H


 


Uric Acid


  4.4 MG/DL


(2.6-7.2)


 


Calcium Level


  8.1 MG/DL


(8.5-10.1)  L


 


Phosphorus Level


  4.9 MG/DL


(2.5-4.9)


 


Magnesium Level


  1.7 MG/DL


(1.8-2.4)  L


 


Total Bilirubin


  0.3 MG/DL


(0.2-1.0)


 


Aspartate Amino Transf


(AST/SGOT) 20 U/L (15-37)


 


 


Alanine Aminotransferase


(ALT/SGPT) 11 U/L (12-78)


L


 


Alkaline Phosphatase


  61 U/L


()


 


C-Reactive Protein,


Quantitative 10.7 mg/dL


(0.00-0.90)  H


 


Pro-B-Type Natriuretic Peptide


  61196 pg/mL


(0-125)  H


 


Total Protein


  4.8 G/DL


(6.4-8.2)  L


 


Albumin


  1.2 G/DL


(3.4-5.0)  L


 


Globulin 3.6 g/dL  


 


Albumin/Globulin Ratio


  0.3 (1.0-2.7)


L











Current Medications








 Medications


  (Trade)  Dose


 Ordered  Sig/Didi


 Route


 PRN Reason  Start Time


 Stop Time Status Last Admin


Dose Admin


 


 Acetaminophen


  (Tylenol)  650 mg  Q4H  PRN


 GT


 FEVER  8/26/20 11:45


 9/25/20 11:44  8/29/20 23:15


 


 


 Albuterol/


 Ipratropium


  (Albuterol/


 Ipratropium)  3 ml  Q4H  PRN


 HHN


 Shortness of Breath  8/29/20 11:30


 9/3/20 11:29   


 


 


 Albuterol/


 Ipratropium


  (Albuterol/


 Ipratropium)  3 ml  TIDRT


 HHN


   8/29/20 13:00


 9/3/20 12:59  9/1/20 07:05


 


 


 Chlorhexidine


 Gluconate


  (Beatrice-Hex 2%)  1 applic  DAILY@2000


 TOPIC


   8/31/20 20:00


 11/29/20 19:59  8/31/20 19:33


 


 


 Clotrimazole


  (Lotrimin)  1 applic  Q12HR


 TOPIC


   8/30/20 13:00


 11/28/20 12:59  9/1/20 09:43


 


 


 Daptomycin 350 mg/


 Sodium Chloride  55 ml @ 


 100 mls/hr  Q48H


 IV


   8/28/20 11:00


 9/5/20 10:59  9/1/20 11:09


 


 


 Dextrose


  (Dextrose 50%)  25 ml  Q30M  PRN


 IV


 Hypoglycemia  8/26/20 11:30


 11/20/20 11:29   


 


 


 Dextrose


  (Dextrose 50%)  50 ml  Q30M  PRN


 IV


 Hypoglycemia  8/26/20 11:30


 11/20/20 11:29   


 


 


 Hydrocortisone


  (Solu-CORTEF)  100 mg  EVERY 8  HOURS


 IV


   8/30/20 14:00


 11/28/20 13:59  9/1/20 06:13


 


 


 Levothyroxine


 Sodium


  (Synthroid)  75 mcg  DAILY


 GT


   8/27/20 09:00


 9/22/20 08:59  9/1/20 09:44


 


 


 Linezolid  300 ml @ 


 300 mls/hr  Q12H


 IVPB


   8/29/20 15:00


 9/5/20 14:59  9/1/20 02:44


 


 


 Meropenem 1 gm/


 Sodium Chloride  55 ml @ 


 110 mls/hr  Q12H


 IVPB


   8/26/20 16:00


 9/5/20 15:59  9/1/20 04:03


 


 


 Micafungin Sodium


 100 mg/Sodium


 Chloride  110 ml @ 


 110 mls/hr  Q24H


 IVPB


   8/27/20 14:00


 9/3/20 13:59  8/31/20 15:06


 


 


 Midodrine


  (Pro-Amatine)  10 mg  Q8HR


 GT


   8/28/20 14:00


 11/26/20 13:59  9/1/20 06:13


 


 


 Norepinephrine


 Bitartrate 16 mg/


 Dextrose  500 ml @ 0


 mls/hr  Q24H


 IV


   8/31/20 07:45


 9/29/20 12:59  9/1/20 02:44


 


 


 Ondansetron HCl


  (Zofran)  4 mg  Q6H  PRN


 IVP


 Nausea & Vomiting  8/26/20 12:00


 9/21/20 11:59   


 


 


 Pantoprazole


  (Protonix)  40 mg  DAILY


 IV


   8/27/20 09:00


 9/22/20 08:59  9/1/20 09:43


 


 


 Phenylephrine HCl


 50 mg/Dextrose  250 ml @ 0


 mls/hr  Q24H  PRN


 IV


 For hypotension  8/29/20 17:45


 9/28/20 17:44  8/31/20 01:49


 


 


 Polyethylene


 Glycol


  (Miralax)  17 gm  DAILYPRN  PRN


 GT


 Constipation  8/26/20 12:00


 9/21/20 11:59   


 


 


 Sodium Chloride  1,000 ml @ 


 50 mls/hr  Q20H


 IV


   8/31/20 09:32


 9/30/20 09:31  9/1/20 06:10


 


 


 Valproic Acid


  (Depakene)  500 mg  EVERY 8  HOURS


 GT


   8/26/20 14:00


 9/21/20 21:59  9/1/20 06:13


 

















Rema Gomes M.D. Sep 1, 2020 11:47

## 2020-09-02 VITALS — DIASTOLIC BLOOD PRESSURE: 46 MMHG | SYSTOLIC BLOOD PRESSURE: 104 MMHG

## 2020-09-02 VITALS — DIASTOLIC BLOOD PRESSURE: 67 MMHG | SYSTOLIC BLOOD PRESSURE: 102 MMHG

## 2020-09-02 VITALS — DIASTOLIC BLOOD PRESSURE: 51 MMHG | SYSTOLIC BLOOD PRESSURE: 116 MMHG

## 2020-09-02 VITALS — DIASTOLIC BLOOD PRESSURE: 61 MMHG | SYSTOLIC BLOOD PRESSURE: 125 MMHG

## 2020-09-02 VITALS — SYSTOLIC BLOOD PRESSURE: 100 MMHG | DIASTOLIC BLOOD PRESSURE: 45 MMHG

## 2020-09-02 VITALS — SYSTOLIC BLOOD PRESSURE: 129 MMHG | DIASTOLIC BLOOD PRESSURE: 59 MMHG

## 2020-09-02 VITALS — SYSTOLIC BLOOD PRESSURE: 114 MMHG | DIASTOLIC BLOOD PRESSURE: 52 MMHG

## 2020-09-02 VITALS — DIASTOLIC BLOOD PRESSURE: 46 MMHG | SYSTOLIC BLOOD PRESSURE: 95 MMHG

## 2020-09-02 VITALS — DIASTOLIC BLOOD PRESSURE: 42 MMHG | SYSTOLIC BLOOD PRESSURE: 114 MMHG

## 2020-09-02 VITALS — SYSTOLIC BLOOD PRESSURE: 110 MMHG | DIASTOLIC BLOOD PRESSURE: 81 MMHG

## 2020-09-02 VITALS — DIASTOLIC BLOOD PRESSURE: 79 MMHG | SYSTOLIC BLOOD PRESSURE: 118 MMHG

## 2020-09-02 VITALS — SYSTOLIC BLOOD PRESSURE: 109 MMHG | DIASTOLIC BLOOD PRESSURE: 58 MMHG

## 2020-09-02 VITALS — SYSTOLIC BLOOD PRESSURE: 104 MMHG | DIASTOLIC BLOOD PRESSURE: 55 MMHG

## 2020-09-02 VITALS — SYSTOLIC BLOOD PRESSURE: 121 MMHG | DIASTOLIC BLOOD PRESSURE: 94 MMHG

## 2020-09-02 VITALS — SYSTOLIC BLOOD PRESSURE: 125 MMHG | DIASTOLIC BLOOD PRESSURE: 67 MMHG

## 2020-09-02 VITALS — DIASTOLIC BLOOD PRESSURE: 44 MMHG | SYSTOLIC BLOOD PRESSURE: 98 MMHG

## 2020-09-02 VITALS — SYSTOLIC BLOOD PRESSURE: 96 MMHG | DIASTOLIC BLOOD PRESSURE: 43 MMHG

## 2020-09-02 VITALS — DIASTOLIC BLOOD PRESSURE: 45 MMHG | SYSTOLIC BLOOD PRESSURE: 112 MMHG

## 2020-09-02 VITALS — SYSTOLIC BLOOD PRESSURE: 98 MMHG | DIASTOLIC BLOOD PRESSURE: 68 MMHG

## 2020-09-02 VITALS — DIASTOLIC BLOOD PRESSURE: 50 MMHG | SYSTOLIC BLOOD PRESSURE: 110 MMHG

## 2020-09-02 VITALS — DIASTOLIC BLOOD PRESSURE: 57 MMHG | SYSTOLIC BLOOD PRESSURE: 101 MMHG

## 2020-09-02 VITALS — DIASTOLIC BLOOD PRESSURE: 53 MMHG | SYSTOLIC BLOOD PRESSURE: 118 MMHG

## 2020-09-02 VITALS — DIASTOLIC BLOOD PRESSURE: 57 MMHG | SYSTOLIC BLOOD PRESSURE: 111 MMHG

## 2020-09-02 VITALS — DIASTOLIC BLOOD PRESSURE: 47 MMHG | SYSTOLIC BLOOD PRESSURE: 99 MMHG

## 2020-09-02 VITALS — SYSTOLIC BLOOD PRESSURE: 123 MMHG | DIASTOLIC BLOOD PRESSURE: 96 MMHG

## 2020-09-02 VITALS — SYSTOLIC BLOOD PRESSURE: 103 MMHG | DIASTOLIC BLOOD PRESSURE: 27 MMHG

## 2020-09-02 VITALS — SYSTOLIC BLOOD PRESSURE: 111 MMHG | DIASTOLIC BLOOD PRESSURE: 50 MMHG

## 2020-09-02 VITALS — DIASTOLIC BLOOD PRESSURE: 65 MMHG | SYSTOLIC BLOOD PRESSURE: 120 MMHG

## 2020-09-02 VITALS — SYSTOLIC BLOOD PRESSURE: 77 MMHG | DIASTOLIC BLOOD PRESSURE: 41 MMHG

## 2020-09-02 VITALS — SYSTOLIC BLOOD PRESSURE: 95 MMHG | DIASTOLIC BLOOD PRESSURE: 44 MMHG

## 2020-09-02 VITALS — SYSTOLIC BLOOD PRESSURE: 108 MMHG | DIASTOLIC BLOOD PRESSURE: 57 MMHG

## 2020-09-02 VITALS — SYSTOLIC BLOOD PRESSURE: 97 MMHG | DIASTOLIC BLOOD PRESSURE: 57 MMHG

## 2020-09-02 VITALS — DIASTOLIC BLOOD PRESSURE: 49 MMHG | SYSTOLIC BLOOD PRESSURE: 103 MMHG

## 2020-09-02 VITALS — DIASTOLIC BLOOD PRESSURE: 49 MMHG | SYSTOLIC BLOOD PRESSURE: 106 MMHG

## 2020-09-02 VITALS — SYSTOLIC BLOOD PRESSURE: 114 MMHG | DIASTOLIC BLOOD PRESSURE: 53 MMHG

## 2020-09-02 VITALS — DIASTOLIC BLOOD PRESSURE: 56 MMHG | SYSTOLIC BLOOD PRESSURE: 95 MMHG

## 2020-09-02 VITALS — DIASTOLIC BLOOD PRESSURE: 60 MMHG | SYSTOLIC BLOOD PRESSURE: 115 MMHG

## 2020-09-02 VITALS — SYSTOLIC BLOOD PRESSURE: 110 MMHG | DIASTOLIC BLOOD PRESSURE: 51 MMHG

## 2020-09-02 VITALS — DIASTOLIC BLOOD PRESSURE: 54 MMHG | SYSTOLIC BLOOD PRESSURE: 81 MMHG

## 2020-09-02 VITALS — SYSTOLIC BLOOD PRESSURE: 97 MMHG | DIASTOLIC BLOOD PRESSURE: 65 MMHG

## 2020-09-02 VITALS — DIASTOLIC BLOOD PRESSURE: 96 MMHG | SYSTOLIC BLOOD PRESSURE: 115 MMHG

## 2020-09-02 VITALS — DIASTOLIC BLOOD PRESSURE: 71 MMHG | SYSTOLIC BLOOD PRESSURE: 136 MMHG

## 2020-09-02 VITALS — DIASTOLIC BLOOD PRESSURE: 88 MMHG | SYSTOLIC BLOOD PRESSURE: 110 MMHG

## 2020-09-02 VITALS — SYSTOLIC BLOOD PRESSURE: 116 MMHG | DIASTOLIC BLOOD PRESSURE: 72 MMHG

## 2020-09-02 VITALS — DIASTOLIC BLOOD PRESSURE: 58 MMHG | SYSTOLIC BLOOD PRESSURE: 101 MMHG

## 2020-09-02 VITALS — DIASTOLIC BLOOD PRESSURE: 55 MMHG | SYSTOLIC BLOOD PRESSURE: 97 MMHG

## 2020-09-02 VITALS — DIASTOLIC BLOOD PRESSURE: 33 MMHG | SYSTOLIC BLOOD PRESSURE: 100 MMHG

## 2020-09-02 VITALS — DIASTOLIC BLOOD PRESSURE: 61 MMHG | SYSTOLIC BLOOD PRESSURE: 102 MMHG

## 2020-09-02 VITALS — SYSTOLIC BLOOD PRESSURE: 95 MMHG | DIASTOLIC BLOOD PRESSURE: 59 MMHG

## 2020-09-02 VITALS — DIASTOLIC BLOOD PRESSURE: 67 MMHG | SYSTOLIC BLOOD PRESSURE: 89 MMHG

## 2020-09-02 VITALS — SYSTOLIC BLOOD PRESSURE: 96 MMHG | DIASTOLIC BLOOD PRESSURE: 49 MMHG

## 2020-09-02 VITALS — SYSTOLIC BLOOD PRESSURE: 109 MMHG | DIASTOLIC BLOOD PRESSURE: 54 MMHG

## 2020-09-02 VITALS — DIASTOLIC BLOOD PRESSURE: 43 MMHG | SYSTOLIC BLOOD PRESSURE: 96 MMHG

## 2020-09-02 VITALS — DIASTOLIC BLOOD PRESSURE: 60 MMHG | SYSTOLIC BLOOD PRESSURE: 117 MMHG

## 2020-09-02 VITALS — SYSTOLIC BLOOD PRESSURE: 104 MMHG | DIASTOLIC BLOOD PRESSURE: 57 MMHG

## 2020-09-02 VITALS — SYSTOLIC BLOOD PRESSURE: 118 MMHG | DIASTOLIC BLOOD PRESSURE: 74 MMHG

## 2020-09-02 VITALS — DIASTOLIC BLOOD PRESSURE: 52 MMHG | SYSTOLIC BLOOD PRESSURE: 110 MMHG

## 2020-09-02 VITALS — DIASTOLIC BLOOD PRESSURE: 54 MMHG | SYSTOLIC BLOOD PRESSURE: 108 MMHG

## 2020-09-02 VITALS — DIASTOLIC BLOOD PRESSURE: 37 MMHG | SYSTOLIC BLOOD PRESSURE: 86 MMHG

## 2020-09-02 VITALS — DIASTOLIC BLOOD PRESSURE: 91 MMHG | SYSTOLIC BLOOD PRESSURE: 112 MMHG

## 2020-09-02 VITALS — DIASTOLIC BLOOD PRESSURE: 57 MMHG | SYSTOLIC BLOOD PRESSURE: 98 MMHG

## 2020-09-02 VITALS — SYSTOLIC BLOOD PRESSURE: 117 MMHG | DIASTOLIC BLOOD PRESSURE: 57 MMHG

## 2020-09-02 VITALS — SYSTOLIC BLOOD PRESSURE: 127 MMHG | DIASTOLIC BLOOD PRESSURE: 91 MMHG

## 2020-09-02 VITALS — SYSTOLIC BLOOD PRESSURE: 100 MMHG | DIASTOLIC BLOOD PRESSURE: 53 MMHG

## 2020-09-02 VITALS — DIASTOLIC BLOOD PRESSURE: 50 MMHG | SYSTOLIC BLOOD PRESSURE: 112 MMHG

## 2020-09-02 VITALS — SYSTOLIC BLOOD PRESSURE: 99 MMHG | DIASTOLIC BLOOD PRESSURE: 48 MMHG

## 2020-09-02 VITALS — SYSTOLIC BLOOD PRESSURE: 107 MMHG | DIASTOLIC BLOOD PRESSURE: 58 MMHG

## 2020-09-02 LAB
ADD MANUAL DIFF: YES
ALBUMIN SERPL-MCNC: 1.6 G/DL (ref 3.4–5)
ALBUMIN/GLOB SERPL: 0.6 {RATIO} (ref 1–2.7)
ALP SERPL-CCNC: 43 U/L (ref 46–116)
ALT SERPL-CCNC: 13 U/L (ref 12–78)
ANION GAP SERPL CALC-SCNC: 13 MMOL/L (ref 5–15)
AST SERPL-CCNC: 26 U/L (ref 15–37)
BILIRUB SERPL-MCNC: 0.4 MG/DL (ref 0.2–1)
BUN SERPL-MCNC: 31 MG/DL (ref 7–18)
CALCIUM SERPL-MCNC: 8.4 MG/DL (ref 8.5–10.1)
CHLORIDE SERPL-SCNC: 111 MMOL/L (ref 98–107)
CK SERPL-CCNC: 9 U/L (ref 26–308)
CO2 SERPL-SCNC: 19 MMOL/L (ref 21–32)
CREAT SERPL-MCNC: 2 MG/DL (ref 0.55–1.3)
ERYTHROCYTE [DISTWIDTH] IN BLOOD BY AUTOMATED COUNT: 13.5 % (ref 11.6–14.8)
GLOBULIN SER-MCNC: 2.7 G/DL
HCT VFR BLD CALC: 24.9 % (ref 37–47)
HGB BLD-MCNC: 8.3 G/DL (ref 12–16)
MCV RBC AUTO: 102 FL (ref 80–99)
PHOSPHATE SERPL-MCNC: 3.1 MG/DL (ref 2.5–4.9)
PLATELET # BLD: 73 K/UL (ref 150–450)
POTASSIUM SERPL-SCNC: 3.2 MMOL/L (ref 3.5–5.1)
RBC # BLD AUTO: 2.43 M/UL (ref 4.2–5.4)
SODIUM SERPL-SCNC: 143 MMOL/L (ref 136–145)
WBC # BLD AUTO: 10.3 K/UL (ref 4.8–10.8)

## 2020-09-02 RX ADMIN — PANTOPRAZOLE SODIUM SCH MG: 40 INJECTION, POWDER, FOR SOLUTION INTRAVENOUS at 08:20

## 2020-09-02 RX ADMIN — HYDROCORTISONE SODIUM SUCCINATE SCH MG: 100 INJECTION, POWDER, FOR SOLUTION INTRAMUSCULAR; INTRAVENOUS at 21:06

## 2020-09-02 RX ADMIN — IPRATROPIUM BROMIDE AND ALBUTEROL SULFATE SCH ML: .5; 3 SOLUTION RESPIRATORY (INHALATION) at 08:30

## 2020-09-02 RX ADMIN — HYDROCORTISONE SODIUM SUCCINATE SCH MG: 100 INJECTION, POWDER, FOR SOLUTION INTRAMUSCULAR; INTRAVENOUS at 13:39

## 2020-09-02 RX ADMIN — DEXTROSE MONOHYDRATE SCH MLS/HR: 50 INJECTION, SOLUTION INTRAVENOUS at 13:39

## 2020-09-02 RX ADMIN — MIDODRINE HYDROCHLORIDE SCH MG: 10 TABLET ORAL at 13:39

## 2020-09-02 RX ADMIN — HYDROCORTISONE SODIUM SUCCINATE SCH MG: 100 INJECTION, POWDER, FOR SOLUTION INTRAMUSCULAR; INTRAVENOUS at 05:45

## 2020-09-02 RX ADMIN — IPRATROPIUM BROMIDE AND ALBUTEROL SULFATE SCH ML: .5; 3 SOLUTION RESPIRATORY (INHALATION) at 14:28

## 2020-09-02 RX ADMIN — VALPROIC ACID SCH MG: 250 SOLUTION ORAL at 13:39

## 2020-09-02 RX ADMIN — MIDODRINE HYDROCHLORIDE SCH MG: 10 TABLET ORAL at 05:45

## 2020-09-02 RX ADMIN — MEROPENEM SCH MLS/HR: 1 INJECTION INTRAVENOUS at 15:46

## 2020-09-02 RX ADMIN — VALPROIC ACID SCH MG: 250 SOLUTION ORAL at 05:45

## 2020-09-02 RX ADMIN — SODIUM CHLORIDE SCH MLS/HR: 900 INJECTION, SOLUTION INTRAVENOUS at 08:20

## 2020-09-02 RX ADMIN — VALPROIC ACID SCH MG: 250 SOLUTION ORAL at 21:05

## 2020-09-02 RX ADMIN — CHLORHEXIDINE GLUCONATE SCH APPLIC: 213 SOLUTION TOPICAL at 19:42

## 2020-09-02 RX ADMIN — MIDODRINE HYDROCHLORIDE SCH MG: 10 TABLET ORAL at 21:06

## 2020-09-02 RX ADMIN — MEROPENEM SCH MLS/HR: 1 INJECTION INTRAVENOUS at 03:50

## 2020-09-02 RX ADMIN — IPRATROPIUM BROMIDE AND ALBUTEROL SULFATE SCH ML: .5; 3 SOLUTION RESPIRATORY (INHALATION) at 19:51

## 2020-09-02 NOTE — NUR
NURSE NOTES:

BP decrease 81/54. Levo increased to 4mcg. Notified to charge RN. 

Pt is asymptomatic.

Will continue to monitor.

## 2020-09-02 NOTE — PULMONOLGY CRITICAL CARE NOTE
Critical Care - Asmt/Plan


Problems:  


(1) Acute respiratory failure


(2) Bacteremia


(3) Pneumonia


(4) Sepsis


(5) HCAP (healthcare-associated pneumonia)


(6) Seizure disorder


(7) Down's syndrome


Respiratory:  adjust tidal volume, monitor respiratory rate, adjust FIO2, CXR, 

ABG


Cardiac:  continue pressors, continue to monitor HR/BP


Renal:  F/U I&O, keep IV fluid


Infectious Disease:  check cultures, continue antibiotics


Gastrointestinal:  start feedings


Endocrine:  monitor blood sugar, check TSH, check HgA1C


Hematologic:  monitor H/H


Neurologic:  PRN Ativan, PRN Morphine


Affect:  PRN ativan


Prophylaxis:  Protonix, Heparin


Time Spent (Minutes):  40


Notes Reviewed:  internist, cardio, renal


Discussed with:  nurses, consultants, 





Critical Care - Objective





Last 24 Hour Vital Signs








  Date Time  Temp Pulse Resp B/P (MAP) Pulse Ox O2 Delivery O2 Flow Rate FiO2


 


9/2/20 10:30  76 27 114/42 (66) 100   


 


9/2/20 10:15  76 28 95/56 (69) 98   


 


9/2/20 10:10  72 30 96/43 (60) 99   


 


9/2/20 10:05  72 30 96/43 (60) 99   


 


9/2/20 10:00  71 30 86/37 (53) 99   


 


9/2/20 10:00    86/37    


 


9/2/20 09:55    77/41    


 


9/2/20 09:55  73 30 77/41 (53) 100   


 


9/2/20 09:30  83 25 100/33 (55) 98   


 


9/2/20 09:29  68 31     60


 


9/2/20 09:00  76 29 101/57 (72) 95   


 


9/2/20 09:00    101/57    


 


9/2/20 08:52        80


 


9/2/20 08:30  70 26 104/55 (71) 94   


 


9/2/20 08:20    115/60    


 


9/2/20 08:00  63      


 


9/2/20 08:00 98.2 62 30 115/60 (78) 97   


 


9/2/20 08:00    115/60    


 


9/2/20 07:56        80


 


9/2/20 07:50      Mechanical Ventilator  





      Mechanical Ventilator  


 


9/2/20 07:47        60


 


9/2/20 07:30  64 29 107/58 (74) 94   


 


9/2/20 07:16  64 30     60


 


9/2/20 07:00    107/58    


 


9/2/20 07:00  68 29 110/50 (70) 92   


 


9/2/20 06:30  69 30 98/44 (62) 94   


 


9/2/20 06:00  73 30 96/49 (65) 92   


 


9/2/20 06:00    98/44    


 


9/2/20 05:30  83 32 99/47 (64) 94   


 


9/2/20 05:15  84 30     60


 


9/2/20 05:00  84 35 95/59 (71) 87   


 


9/2/20 05:00    99/47    


 


9/2/20 04:30  73 30 95/44 (61) 91   


 


9/2/20 04:00  80      


 


9/2/20 04:00      Mechanical Ventilator  





      Mechanical Ventilator  


 


9/2/20 04:00    95/44    


 


9/2/20 04:00        60


 


9/2/20 04:00 98.2 81 30 110/51 (70) 95   


 


9/2/20 03:30  75 28 111/50 (70) 94   


 


9/2/20 03:02  77 32     60


 


9/2/20 03:00    111/50    


 


9/2/20 03:00  67 30 129/59 (82) 95   


 


9/2/20 02:30  79 29 97/65 (76) 92   


 


9/2/20 02:00  72 30 120/65 (83) 96   


 


9/2/20 02:00    97/65    


 


9/2/20 01:30  74 31     60


 


9/2/20 01:30  82 30 121/94 (103) 91   


 


9/2/20 01:00    121/94    


 


9/2/20 01:00  70 30 116/51 (72) 94   


 


9/2/20 00:30  79 27 98/57 (71) 90   


 


9/2/20 00:00        60


 


9/2/20 00:00    98/57    


 


9/2/20 00:00      Mechanical Ventilator  





      Mechanical Ventilator  


 


9/2/20 00:00  72      


 


9/2/20 00:00 98.2 83 23 111/57 (75) 87   


 


9/1/20 23:30  71 30 96/54 (68) 93   


 


9/1/20 23:00  77 30 98/51 (67) 95   


 


9/1/20 23:00    96/54    


 


9/1/20 22:51  82 33     60


 


9/1/20 22:30  80 30 124/55 (78) 94   


 


9/1/20 22:00    124/55    


 


9/1/20 22:00  82 30 95/53 (67) 93   


 


9/1/20 21:30  84 29 104/45 (64) 92   


 


9/1/20 21:12  72 30     60


 


9/1/20 21:00    104/45    


 


9/1/20 21:00  79 27 100/50 (67) 94   


 


9/1/20 20:30  83 27 113/99 (104) 93   


 


9/1/20 20:00  72      


 


9/1/20 20:00 98.4 83 28 114/57 (76) 93   


 


9/1/20 20:00        60


 


9/1/20 20:00    113/99    


 


9/1/20 20:00      Mechanical Ventilator  





      Mechanical Ventilator  


 


9/1/20 19:30  68 30 111/90 (97) 93   


 


9/1/20 19:30  73 30  100 Mechanical Ventilator  60





  80 30     60


 


9/1/20 19:00    107/45    


 


9/1/20 18:45  73 30 107/45 (65) 93   


 


9/1/20 18:30 99.0 71 30 113/52 (72) 93   


 


9/1/20 18:01  82 27 116/58 (77) 90   


 


9/1/20 18:00    116/58    


 


9/1/20 17:30  81 26 109/51 (70) 91   


 


9/1/20 17:00  80 25 104/58 (73) 98   


 


9/1/20 17:00    104/48    


 


9/1/20 16:30  81 27 90/53 (65) 93   


 


9/1/20 16:00  72 30 98/42 (60) 93   


 


9/1/20 16:00      Mechanical Ventilator  





      Mechanical Ventilator  


 


9/1/20 16:00 99.0       


 


9/1/20 16:00    110/51    


 


9/1/20 16:00  72      


 


9/1/20 16:00        60


 


9/1/20 15:30  81 26 94/70 (78) 94   


 


9/1/20 15:02  71 30     60


 


9/1/20 15:00    105/50    


 


9/1/20 15:00  80 29 110/71 (84) 94   


 


9/1/20 14:30  74 29 97/71 (80) 96   


 


9/1/20 14:00  72 28 91/45 (60) 96   


 


9/1/20 14:00    91/70    


 


9/1/20 13:30  79 28 75/55 (62) 96   


 


9/1/20 13:00  77 29 93/53 (66) 96   


 


9/1/20 13:00    93/53    


 


9/1/20 12:32  73 30  98 Mechanical Ventilator  60





  75 30     60


 


9/1/20 12:30  74 29 84/47 (59) 96   


 


9/1/20 12:00      Mechanical Ventilator  





      Mechanical Ventilator  


 


9/1/20 12:00  70 28 94/60 (71) 95   


 


9/1/20 12:00    94/60    


 


9/1/20 12:00  70      


 


9/1/20 11:30  61 30 112/44 (66) 97   


 


9/1/20 11:15  62 30 105/41 (62) 96   








Status:  sedated


Condition:  critical, grave


HEENT:  atraumatic, normocephalic


Lungs:  rales, rhonchi


Heart:  HR/BP stable


Abdomen:  feeding tube


Extremities:  edema


Micro:





Microbiology








 Date/Time


Source Procedure


Growth Status


 


 


 9/1/20 16:14


Sputum Gram Stain


Pending Resulted


 


 9/1/20 16:14


Sputum Sputum Culture - Preliminary


NO GROWTH Resulted








Accucheck:  131





Critical Care - Subjective


ROS Limited/Unobtainable:  Yes


Condition:  critical


EKG Rhythm:  Sinus Rhythm


FI02:  60


Vent Support Breath Rate:  30


Vent Support Mode:  AC


Vent Tidal Volume:  500


Sputum Amount:  Small


PEEP:  10.0


PIP:  45


Tube Feeding Amount:  20


I&O:











Intake and Output  


 


 9/1/20 9/2/20





 19:00 07:00


 


Intake Total 1736.2545 ml 1389.96 ml


 


Output Total 1025 ml 1390 ml


 


Balance 711.2545 ml -0.04 ml


 


  


 


Free Water  120 ml


 


IV Total 1536.2545 ml 1029.96 ml


 


Tube Feeding 200 ml 240 ml


 


Output Urine Total 1025 ml 1390 ml


 


# Bowel Movements 1 5








ET-Tube:  7.5


ET Position:  22


Labs:





Laboratory Tests








Test


  9/2/20


04:35 9/2/20


07:42


 


White Blood Count


  10.3 K/UL


(4.8-10.8) 


 


 


Red Blood Count


  2.43 M/UL


(4.20-5.40)  L 


 


 


Hemoglobin


  8.3 G/DL


(12.0-16.0)  L 


 


 


Hematocrit


  24.9 %


(37.0-47.0)  L 


 


 


Mean Corpuscular Volume


  102 FL (80-99)


H 


 


 


Mean Corpuscular Hemoglobin


  34.1 PG


(27.0-31.0)  H 


 


 


Mean Corpuscular Hemoglobin


Concent 33.3 G/DL


(32.0-36.0) 


 


 


Red Cell Distribution Width


  13.5 %


(11.6-14.8) 


 


 


Platelet Count


  73 K/UL


(150-450)  L 


 


 


Mean Platelet Volume


  8.6 FL


(6.5-10.1) 


 


 


Neutrophils (%) (Auto)


  % (45.0-75.0)


  


 


 


Lymphocytes (%) (Auto)


  % (20.0-45.0)


  


 


 


Monocytes (%) (Auto)  % (1.0-10.0)   


 


Eosinophils (%) (Auto)  % (0.0-3.0)   


 


Basophils (%) (Auto)  % (0.0-2.0)   


 


Differential Total Cells


Counted 100  


  


 


 


Neutrophils % (Manual) 84 % (45-75)  H 


 


Lymphocytes % (Manual) 6 % (20-45)  L 


 


Monocytes % (Manual) 10 % (1-10)   


 


Eosinophils % (Manual) 0 % (0-3)   


 


Basophils % (Manual) 0 % (0-2)   


 


Band Neutrophils 0 % (0-8)   


 


Nucleated Red Blood Cells 1 /100 WBC   


 


Platelet Estimate Decreased  L 


 


Platelet Morphology Normal   


 


Hypochromasia 2+   


 


Anisocytosis 1+   


 


Macrocytosis 1+   


 


Sodium Level


  143 MMOL/L


(136-145) 


 


 


Potassium Level


  3.2 MMOL/L


(3.5-5.1)  L 


 


 


Chloride Level


  111 MMOL/L


()  H 


 


 


Carbon Dioxide Level


  19 MMOL/L


(21-32)  L 


 


 


Anion Gap


  13 mmol/L


(5-15) 


 


 


Blood Urea Nitrogen


  31 mg/dL


(7-18)  H 


 


 


Creatinine


  2.0 MG/DL


(0.55-1.30)  H 


 


 


Estimat Glomerular Filtration


Rate 25.6 mL/min


(>60) 


 


 


Glucose Level


  109 MG/DL


()  H 


 


 


Calcium Level


  8.4 MG/DL


(8.5-10.1)  L 


 


 


Phosphorus Level


  3.1 MG/DL


(2.5-4.9) 


 


 


Magnesium Level


  2.0 MG/DL


(1.8-2.4) 


 


 


Total Bilirubin


  0.4 MG/DL


(0.2-1.0) 


 


 


Aspartate Amino Transf


(AST/SGOT) 26 U/L (15-37)


  


 


 


Alanine Aminotransferase


(ALT/SGPT) 13 U/L (12-78)


  


 


 


Alkaline Phosphatase


  43 U/L


()  L 


 


 


Total Creatine Kinase


  9 U/L ()


L 


 


 


Total Protein


  4.3 G/DL


(6.4-8.2)  L 


 


 


Albumin


  1.6 G/DL


(3.4-5.0)  L 


 


 


Globulin 2.7 g/dL   


 


Albumin/Globulin Ratio


  0.6 (1.0-2.7)


L 


 


 


Arterial Blood pH


  


  7.390


(7.350-7.450)


 


Arterial Blood Partial


Pressure CO2 


  26.3 mmHg


(35.0-45.0)  L


 


Arterial Blood Partial


Pressure O2 


  54.0 mmHg


(75.0-100.0)  L


 


Arterial Blood HCO3


  


  15.6 mmol/L


(22.0-26.0)  *L


 


Arterial Blood Oxygen


Saturation 


  88.0 %


()  *L


 


Arterial Blood Base Excess  -8.2 (-2-2)  L


 


Cole Test  Positive  

















Chano Cherry MD Sep 2, 2020 11:02

## 2020-09-02 NOTE — NUR
NURSE HAND-OFF REPORT: 



Latest Vital Signs: Temperature 97.8 , Pulse 64 , B/P 117 /60 , Respiratory Rate 29 , O2 SAT 
100 , Mechanical Ventilator, O2 Flow Rate 15.0 .  

Vital Sign Comment: STABLE. Titrated Levophed to 2mcg at 1830. RN Elena aware.



EKG Rhythm: Sinus Rhythm

Rhythm change?: N 

MD Notified?: -

MD Response: 



Latest Momin Fall Score: 50  

Fall Risk: High Risk 

Safety Measures: Call light Within Reach, Bed Alarm Zone 2, Side Rails Side Rails x3, Bed 
position Low and Locked.

Fall Precautions: 

Yellow Socks

Door Sign

Patient Fall Education



Report given to Elena TRAYLOR. Patient stable. Plan of care endorsed. RN aware that feeding is on 
hold due to a residual of 150mLs at 1800. Reassessed at 1900 and residual found to be 60mLs.

## 2020-09-02 NOTE — NUR
NURSE NOTES:

PM med given.

Pt is comfortably asleep.

Afebrile and VSS. BP is stable with Levo 4mcgs.

Safety measures observed.

Will continue to monitor.

## 2020-09-02 NOTE — NUR
NURSE NOTES:

Patient cleaned and prior to lowering the head of the bed, patient desated to 84%. RT called 
and Fio2 changed to 100%. Patient had 1 BM, soft. urine output 50mLs. Levophed running at 
6mcg and abx. Feeding running at 40mL/hr with 10mL residual. Oral care and suctioning 
performed.

## 2020-09-02 NOTE — DIAGNOSTIC IMAGING REPORT
Indication: Dyspnea

 

Technique: One view of the chest

 

Comparison: 8/31/2020

 

Findings: Stable satisfactory position of endotracheal tube, left arm PICC. Bilateral

extensive diffuse infiltrates versus edema and bilateral large pleural effusions

persist

 

Impression:  Unchanged, over 2 days, findings as above.

## 2020-09-02 NOTE — NUR
NURSE NOTES:

Residual of 10mLs. Feeding continued and medications given. Patient continues to be stable 
with no s/sx of pain or distress. Appears more alert and is able to follow simple commands. 
Able to squeeze hand when asked with strength 2/5 BL. Urine output 60mLs. No BM at this 
time. Patient repositioned and suctioned.

## 2020-09-02 NOTE — NUR
NURSE NOTES:

Patient's SBP consistantly over 110 and as high as 130. Levophed titrated to 4mcg.

-------------------------------------------------------------------------------

Addendum: 09/02/20 at 1739 by CARLA NGUYEN RN

-------------------------------------------------------------------------------

FIO2 changed to 80%.

## 2020-09-02 NOTE — NUR
NURSE NOTES:

Dr. Gomes called and message left regarding 4 bowel movement today. Asked if stool should 
be tested. Awaiting call back.

-------------------------------------------------------------------------------

Addendum: 09/02/20 at 1011 by CARLA NGUYEN RN

-------------------------------------------------------------------------------

No new orders. Continue to monitor.

## 2020-09-02 NOTE — NUR
NURSE NOTES:

Pts left eye slightly swollen due to constant positioning with Rt Chest elevated as well as 
pulling of fluids. Aware Charge LAYTON Petersen. Will endorsed to LAYTON Davila to notify Dr Cherry.

## 2020-09-02 NOTE — INTERNAL MED PROGRESS NOTE
Subjective


Physician Name


Tyson Hung


Attending Physician


Chano Cherry MD





Current Medications








 Medications


  (Trade)  Dose


 Ordered  Sig/Didi


 Route


 PRN Reason  Start Time


 Stop Time Status Last Admin


Dose Admin


 


 Acetaminophen


  (Tylenol)  650 mg  Q4H  PRN


 GT


 FEVER  8/26/20 11:45


 9/25/20 11:44  8/29/20 23:15


 


 


 Albuterol/


 Ipratropium


  (Albuterol/


 Ipratropium)  3 ml  Q4H  PRN


 HHN


 Shortness of Breath  8/29/20 11:30


 9/3/20 11:29   


 


 


 Albuterol/


 Ipratropium


  (Albuterol/


 Ipratropium)  3 ml  TIDRT


 HHN


   8/29/20 13:00


 9/3/20 12:59  9/2/20 14:28


 


 


 Ascorbic Acid


  (Vitamin C)  250 mg  DAILY


 GT


   9/3/20 09:00


 10/3/20 08:59   


 


 


 Chlorhexidine


 Gluconate


  (Beatrice-Hex 2%)  1 applic  DAILY@2000


 TOPIC


   8/31/20 20:00


 11/29/20 19:59  9/1/20 19:54


 


 


 Clotrimazole


  (Lotrimin)  1 applic  Q12HR


 TOPIC


   8/30/20 13:00


 11/28/20 12:59  9/2/20 08:21


 


 


 Daptomycin 350 mg/


 Sodium Chloride  55 ml @ 


 100 mls/hr  Q48H


 IV


   8/28/20 11:00


 9/5/20 10:59  9/1/20 11:09


 


 


 Dextrose


  (Dextrose 50%)  25 ml  Q30M  PRN


 IV


 Hypoglycemia  8/26/20 11:30


 11/20/20 11:29   


 


 


 Dextrose


  (Dextrose 50%)  50 ml  Q30M  PRN


 IV


 Hypoglycemia  8/26/20 11:30


 11/20/20 11:29   


 


 


 Hydrocortisone


  (Solu-CORTEF)  100 mg  EVERY 8  HOURS


 IV


   8/30/20 14:00


 11/28/20 13:59  9/2/20 13:39


 


 


 Levothyroxine


 Sodium


  (Synthroid)  75 mcg  DAILY


 GT


   8/27/20 09:00


 9/22/20 08:59  9/2/20 08:20


 


 


 Meropenem 1 gm/


 Sodium Chloride  55 ml @ 


 110 mls/hr  Q12H


 IVPB


   8/26/20 16:00


 9/5/20 15:59  9/2/20 15:46


 


 


 Micafungin Sodium


 100 mg/Sodium


 Chloride  110 ml @ 


 110 mls/hr  Q24H


 IVPB


   8/27/20 14:00


 9/3/20 13:59  9/2/20 13:39


 


 


 Midodrine


  (Pro-Amatine)  10 mg  Q8HR


 GT


   8/28/20 14:00


 11/26/20 13:59  9/2/20 13:39


 


 


 Norepinephrine


 Bitartrate 16 mg/


 Dextrose  500 ml @ 0


 mls/hr  Q24H


 IV


   8/31/20 07:45


 9/29/20 12:59  9/2/20 08:20


 


 


 Ondansetron HCl


  (Zofran)  4 mg  Q6H  PRN


 IVP


 Nausea & Vomiting  8/26/20 12:00


 9/21/20 11:59   


 


 


 Pantoprazole


  (Protonix)  40 mg  DAILY


 IV


   8/27/20 09:00


 9/22/20 08:59  9/2/20 08:20


 


 


 Phenylephrine HCl


 50 mg/Dextrose  250 ml @ 0


 mls/hr  Q24H  PRN


 IV


 For hypotension  8/29/20 17:45


 9/28/20 17:44  8/31/20 01:49


 


 


 Polyethylene


 Glycol


  (Miralax)  17 gm  DAILYPRN  PRN


 GT


 Constipation  8/26/20 12:00


 9/21/20 11:59   


 


 


 Sodium Chloride  1,000 ml @ 


 50 mls/hr  Q20H


 IV


   8/31/20 09:32


 9/30/20 09:31  9/2/20 14:03


 


 


 Valproic Acid


  (Depakene)  500 mg  EVERY 8  HOURS


 GT


   8/26/20 14:00


 9/21/20 21:59  9/2/20 13:39


 








Allergies:  


Coded Allergies:  


     No Known Allergies (Unverified , 1/8/19)


Subjective


in ICU, remained intubated on the vent, open eyes, unable to follow command.





Objective





Last Vital Signs








  Date Time  Temp Pulse Resp B/P (MAP) Pulse Ox O2 Delivery O2 Flow Rate FiO2


 


9/2/20 16:30  70 29 136/71 (92) 100   


 


9/2/20 16:05        100


 


9/2/20 16:00      Mechanical Ventilator  





      Mechanical Ventilator  


 


9/2/20 16:00 97.8       


 


8/27/20 00:00       15.0 











Laboratory Tests








Test


  9/2/20


04:35 9/2/20


07:42


 


White Blood Count


  10.3 K/UL


(4.8-10.8) 


 


 


Red Blood Count


  2.43 M/UL


(4.20-5.40)  L 


 


 


Hemoglobin


  8.3 G/DL


(12.0-16.0)  L 


 


 


Hematocrit


  24.9 %


(37.0-47.0)  L 


 


 


Mean Corpuscular Volume


  102 FL (80-99)


H 


 


 


Mean Corpuscular Hemoglobin


  34.1 PG


(27.0-31.0)  H 


 


 


Mean Corpuscular Hemoglobin


Concent 33.3 G/DL


(32.0-36.0) 


 


 


Red Cell Distribution Width


  13.5 %


(11.6-14.8) 


 


 


Platelet Count


  73 K/UL


(150-450)  L 


 


 


Mean Platelet Volume


  8.6 FL


(6.5-10.1) 


 


 


Neutrophils (%) (Auto)


  % (45.0-75.0)


  


 


 


Lymphocytes (%) (Auto)


  % (20.0-45.0)


  


 


 


Monocytes (%) (Auto)  % (1.0-10.0)   


 


Eosinophils (%) (Auto)  % (0.0-3.0)   


 


Basophils (%) (Auto)  % (0.0-2.0)   


 


Differential Total Cells


Counted 100  


  


 


 


Neutrophils % (Manual) 84 % (45-75)  H 


 


Lymphocytes % (Manual) 6 % (20-45)  L 


 


Monocytes % (Manual) 10 % (1-10)   


 


Eosinophils % (Manual) 0 % (0-3)   


 


Basophils % (Manual) 0 % (0-2)   


 


Band Neutrophils 0 % (0-8)   


 


Nucleated Red Blood Cells 1 /100 WBC   


 


Platelet Estimate Decreased  L 


 


Platelet Morphology Normal   


 


Hypochromasia 2+   


 


Anisocytosis 1+   


 


Macrocytosis 1+   


 


Sodium Level


  143 MMOL/L


(136-145) 


 


 


Potassium Level


  3.2 MMOL/L


(3.5-5.1)  L 


 


 


Chloride Level


  111 MMOL/L


()  H 


 


 


Carbon Dioxide Level


  19 MMOL/L


(21-32)  L 


 


 


Anion Gap


  13 mmol/L


(5-15) 


 


 


Blood Urea Nitrogen


  31 mg/dL


(7-18)  H 


 


 


Creatinine


  2.0 MG/DL


(0.55-1.30)  H 


 


 


Estimat Glomerular Filtration


Rate 25.6 mL/min


(>60) 


 


 


Glucose Level


  109 MG/DL


()  H 


 


 


Calcium Level


  8.4 MG/DL


(8.5-10.1)  L 


 


 


Phosphorus Level


  3.1 MG/DL


(2.5-4.9) 


 


 


Magnesium Level


  2.0 MG/DL


(1.8-2.4) 


 


 


Total Bilirubin


  0.4 MG/DL


(0.2-1.0) 


 


 


Aspartate Amino Transf


(AST/SGOT) 26 U/L (15-37)


  


 


 


Alanine Aminotransferase


(ALT/SGPT) 13 U/L (12-78)


  


 


 


Alkaline Phosphatase


  43 U/L


()  L 


 


 


Total Creatine Kinase


  9 U/L ()


L 


 


 


Total Protein


  4.3 G/DL


(6.4-8.2)  L 


 


 


Albumin


  1.6 G/DL


(3.4-5.0)  L 


 


 


Globulin 2.7 g/dL   


 


Albumin/Globulin Ratio


  0.6 (1.0-2.7)


L 


 


 


Arterial Blood pH


  


  7.390


(7.350-7.450)


 


Arterial Blood Partial


Pressure CO2 


  26.3 mmHg


(35.0-45.0)  L


 


Arterial Blood Partial


Pressure O2 


  54.0 mmHg


(75.0-100.0)  L


 


Arterial Blood HCO3


  


  15.6 mmol/L


(22.0-26.0)  *L


 


Arterial Blood Oxygen


Saturation 


  88.0 %


()  *L


 


Arterial Blood Base Excess  -8.2 (-2-2)  L


 


Cole Test  Positive  











Microbiology








 Date/Time


Source Procedure


Growth Status


 


 


 9/1/20 16:14


Sputum Gram Stain - Final Resulted


 


 9/1/20 16:14


Sputum Sputum Culture - Preliminary


NO GROWTH Resulted

















Intake and Output  


 


 9/1/20 9/2/20





 19:00 07:00


 


Intake Total 1736.2545 ml 1389.96 ml


 


Output Total 1025 ml 1390 ml


 


Balance 711.2545 ml -0.04 ml


 


  


 


Free Water  120 ml


 


IV Total 1536.2545 ml 1029.96 ml


 


Tube Feeding 200 ml 240 ml


 


Output Urine Total 1025 ml 1390 ml


 


# Bowel Movements 1 5








Objective


General: remain intubated, unable to follow command, open eyes.


HEENT: NCAT, sclera anicteric, PERRL, ET Tube.


Neck: Supple, no significant jugular venous distention, 


Lungs: mechanical breath sounds decreased air at the bases,  no Wheeze or Rales.


Heart: Regular rate and rhythm, normal S1/S2, no murmurs/gallops


Abdomen: soft, not tender, not distended. + bowel sounds, Morbid Obesity, PEG 

site intact.


Extremities: No Cyanosis , clubbing,  upper extremities edema. left upper 

extremity PICC line.


Neuro: limited secondary to patient status, unable to follow command, bilateral 

upper and lower extremities contracted.





Assessment/Plan


Assessment/Plan


Problem List:  


(1) HCAP (healthcare-associated pneumonia)


(2) Sepsis


(3) Down's syndrome


(4) Dysphagia S/P PEG


(5) Seizure disorder


(6) Hypothyroidism


(7) Acute Hypoxemic respiratory failure





Plan: 


Tolerated tube feeding @ 40 cc/hr


Follow-up with laboratory and cultures


Cont Daptomycin #9(abx d #12) for GPC bacteremia in view of DAVID [monitor CK, 

was 51 on 8/26]


Cont Meropenem#8 (abx d #12) given resp decompensation


Cont Micafungin #7/7


COVID neg x3;











Tyson Hung MD Sep 2, 2020 17:31

## 2020-09-02 NOTE — NUR
NURSE NOTES:

Patient received from Lucie TRAYLOR. VSS. NSR. Patient stable AOx0 with no s/sx of pain or 
distress. Patient now able to make eye contact and track. Eyes SMITH but unable to determine 
size due to pupil shape. RR even and unlabored through ETT tube 7.5 lip line 22. Vent 
settings AC 30 500mL 60% P5. Cardiac sounds benign. BL lung sounds rales. Pitting edema +2 
on BL hands and feet with weak pulses to dorsalis pedis. +1 edema to arms. Valle draining 
well to gravity with 50mL. LT UA PICC line dressing dry and intact, patent. IV fluids and 
Levophed at 3mcg infusing. Bowel sounds hyperactive. Gastric tube in place. Feeding held at 
this time prior to synthroid admiistration.Side rails upx2 call light within reach, bed low 
and locked. Will continue to monitor.

## 2020-09-02 NOTE — NUR
NURSE NOTES:

At 1055 SBP 77. Levophed titrated up to 5mcg. After 5min, SBP 86. Levophed titrated again to 
6mcg. After 5min at 1005, SBP in 90's. Will continue to monitor.

## 2020-09-02 NOTE — NUR
NURSE NOTES:

ABG reported to Dr. Cherry. Orders received to increase peep to 10. RT called and notified.

-------------------------------------------------------------------------------

Addendum: 09/02/20 at 0853 by CARLA NGUYEN RN

-------------------------------------------------------------------------------

Peep changed to 10 as ordered.

## 2020-09-02 NOTE — NUR
NURSE NOTES:

Pts desat easily down to 88%, during bed bath, bagged with 100% and pt 02 sat back to >90s

## 2020-09-02 NOTE — NEPHROLOGY PROGRESS NOTE
Assessment/Plan


Problem List:  


(1) DAVID (acute kidney injury)


(2) Respiratory failure requiring intubation


(3) Down's syndrome


(4) Seizure disorder


(5) Hypothyroidism


Assessment





Acute renal failure, likely due to hypotension


Acute respiratory distress, hypoxia


Seizure disorder


Hypothyroidism


Down syndrome


Full code











Fluid challenge with IV fluids and albumin


Midodrine for BP above 100 systolic


Check TSH level


Check


Correct level


Monitor renal parameters


Urine studies


Per orders


Plan


September 2: Status quo.  Labs reviewed.  Phosphorus supplement IV given.  

Serum creatinine 2.  Continue per consultants.


September 1: Requires less pressors.  Albumin bolus given.  1 dose of Lasix IV 

ordered as the patient severely edematous.  Patient serum albumin is very low.  

Continue per consultants.


August 31: Continues to be intubated.  Labs reviewed.  Serum creatinine 1.9 

unchanged.  Blood pressure more stable.  Off 1 of the pressors.  Continue to 

monitor renal parameters.  Continue per consultants.  Patient now on 

hydrocortisone 100 mg every 8 hours.  Will decrease IV fluid.  Normal saline 

down to 50 cc an hour.


August 30: Intubated.  Labs reviewed.  Creatinine 1.9 unchanged.  Continue same 

treatment plan.  Per consultants.  Overall poor prognosis since the patient 

remains on pressors and her pulmonary status is worsening.


August 29: Remains intubated.  Labs reviewed.  Creatinine 1.9.  Blood pressure 

systolic 90s.  Continue per consultants.


August 28: Remains intubated.  Labs reviewed.  Serum creatinine lower to 2.  

Vancomycin level lower.  Remains hypotensive on pressors.  Will increase 

midodrine to 10 mg every 8 hours.  Continue per consultants.  Continue to 

monitor renal parameters.


August 27: Patient now in ICU.  Intubated.  On pressors.  Labs reviewed.  Will 

increase midodrine.  Aim to keep blood pressure over 100 systolic.  Will give 

albumin bolus.  Will check vancomycin level which was elevated when checked 

previously on August 24.  Will monitor renal parameters.  Continue per 

consultants.





Subjective


ROS Limited/Unobtainable:  Yes





Objective


Objective





Last 24 Hour Vital Signs








  Date Time  Temp Pulse Resp B/P (MAP) Pulse Ox O2 Delivery O2 Flow Rate FiO2


 


9/2/20 08:52        80


 


9/2/20 08:20    115/60    


 


9/2/20 08:00  63      


 


9/2/20 07:56        80


 


9/2/20 07:50      Mechanical Ventilator  





      Mechanical Ventilator  


 


9/2/20 07:47        60


 


9/2/20 07:30  64 29 107/58 (74) 94   


 


9/2/20 07:00    107/58    


 


9/2/20 07:00  68 29 110/50 (70) 92   


 


9/2/20 06:30  69 30 98/44 (62) 94   


 


9/2/20 06:00  73 30 96/49 (65) 92   


 


9/2/20 06:00    98/44    


 


9/2/20 05:30  83 32 99/47 (64) 94   


 


9/2/20 05:15  84 30     60


 


9/2/20 05:00  84 35 95/59 (71) 87   


 


9/2/20 05:00    99/47    


 


9/2/20 04:30  73 30 95/44 (61) 91   


 


9/2/20 04:00  80      


 


9/2/20 04:00      Mechanical Ventilator  





      Mechanical Ventilator  


 


9/2/20 04:00    95/44    


 


9/2/20 04:00        60


 


9/2/20 04:00 98.2 81 30 110/51 (70) 95   


 


9/2/20 03:30  75 28 111/50 (70) 94   


 


9/2/20 03:02  77 32     60


 


9/2/20 03:00    111/50    


 


9/2/20 03:00  67 30 129/59 (82) 95   


 


9/2/20 02:30  79 29 97/65 (76) 92   


 


9/2/20 02:00  72 30 120/65 (83) 96   


 


9/2/20 02:00    97/65    


 


9/2/20 01:30  74 31     60


 


9/2/20 01:30  82 30 121/94 (103) 91   


 


9/2/20 01:00    121/94    


 


9/2/20 01:00  70 30 116/51 (72) 94   


 


9/2/20 00:30  79 27 98/57 (71) 90   


 


9/2/20 00:00        60


 


9/2/20 00:00    98/57    


 


9/2/20 00:00      Mechanical Ventilator  





      Mechanical Ventilator  


 


9/2/20 00:00  72      


 


9/2/20 00:00 98.2 83 23 111/57 (75) 87   


 


9/1/20 23:30  71 30 96/54 (68) 93   


 


9/1/20 23:00  77 30 98/51 (67) 95   


 


9/1/20 23:00    96/54    


 


9/1/20 22:51  82 33     60


 


9/1/20 22:30  80 30 124/55 (78) 94   


 


9/1/20 22:00    124/55    


 


9/1/20 22:00  82 30 95/53 (67) 93   


 


9/1/20 21:30  84 29 104/45 (64) 92   


 


9/1/20 21:12  72 30     60


 


9/1/20 21:00    104/45    


 


9/1/20 21:00  79 27 100/50 (67) 94   


 


9/1/20 20:30  83 27 113/99 (104) 93   


 


9/1/20 20:00  72      


 


9/1/20 20:00 98.4 83 28 114/57 (76) 93   


 


9/1/20 20:00        60


 


9/1/20 20:00    113/99    


 


9/1/20 20:00      Mechanical Ventilator  





      Mechanical Ventilator  


 


9/1/20 19:30  68 30 111/90 (97) 93   


 


9/1/20 19:30  73 30  100 Mechanical Ventilator  60





  80 30     60


 


9/1/20 19:00    107/45    


 


9/1/20 18:45  73 30 107/45 (65) 93   


 


9/1/20 18:30 99.0 71 30 113/52 (72) 93   


 


9/1/20 18:01  82 27 116/58 (77) 90   


 


9/1/20 18:00    116/58    


 


9/1/20 17:30  81 26 109/51 (70) 91   


 


9/1/20 17:00  80 25 104/58 (73) 98   


 


9/1/20 17:00    104/48    


 


9/1/20 16:30  81 27 90/53 (65) 93   


 


9/1/20 16:00  72 30 98/42 (60) 93   


 


9/1/20 16:00      Mechanical Ventilator  





      Mechanical Ventilator  


 


9/1/20 16:00 99.0       


 


9/1/20 16:00    110/51    


 


9/1/20 16:00  72      


 


9/1/20 16:00        60


 


9/1/20 15:30  81 26 94/70 (78) 94   


 


9/1/20 15:02  71 30     60


 


9/1/20 15:00    105/50    


 


9/1/20 15:00  80 29 110/71 (84) 94   


 


9/1/20 14:30  74 29 97/71 (80) 96   


 


9/1/20 14:00  72 28 91/45 (60) 96   


 


9/1/20 14:00    91/70    


 


9/1/20 13:30  79 28 75/55 (62) 96   


 


9/1/20 13:00  77 29 93/53 (66) 96   


 


9/1/20 13:00    93/53    


 


9/1/20 12:32  73 30  98 Mechanical Ventilator  60





  75 30     60


 


9/1/20 12:30  74 29 84/47 (59) 96   


 


9/1/20 12:00      Mechanical Ventilator  





      Mechanical Ventilator  


 


9/1/20 12:00  70 28 94/60 (71) 95   


 


9/1/20 12:00    94/60    


 


9/1/20 12:00  70      


 


9/1/20 11:30  61 30 112/44 (66) 97   


 


9/1/20 11:15  62 30 105/41 (62) 96   


 


9/1/20 11:00  63 30 100/52 (68) 97   


 


9/1/20 11:00    105/41    


 


9/1/20 10:54  65 30     60


 


9/1/20 10:45  70 30 108/49 (68) 97   


 


9/1/20 10:30  66 30 97/46 (63) 97   


 


9/1/20 10:15  69 30 96/44 (61) 97   


 


9/1/20 10:00  71 30 96/50 (65) 96   


 


9/1/20 10:00    96/50    


 


9/1/20 09:45    103/90    


 


9/1/20 09:45  82 25 103/90 (94) 95   


 


9/1/20 09:30    116/56    


 


9/1/20 09:30  75 30 103/51 (68) 96   

















Intake and Output  


 


 9/1/20 9/2/20





 19:00 07:00


 


Intake Total 1736.2545 ml 1389.96 ml


 


Output Total 1025 ml 1390 ml


 


Balance 711.2545 ml -0.04 ml


 


  


 


Free Water  120 ml


 


IV Total 1536.2545 ml 1029.96 ml


 


Tube Feeding 200 ml 240 ml


 


Output Urine Total 1025 ml 1390 ml


 


# Bowel Movements 1 5











Current Medications








 Medications


  (Trade)  Dose


 Ordered  Sig/Didi


 Route


 PRN Reason  Start Time


 Stop Time Status Last Admin


Dose Admin


 


 Acetaminophen


  (Tylenol)  650 mg  Q4H  PRN


 GT


 FEVER  8/26/20 11:45


 9/25/20 11:44  8/29/20 23:15


 


 


 Albuterol/


 Ipratropium


  (Albuterol/


 Ipratropium)  3 ml  Q4H  PRN


 HHN


 Shortness of Breath  8/29/20 11:30


 9/3/20 11:29   


 


 


 Albuterol/


 Ipratropium


  (Albuterol/


 Ipratropium)  3 ml  TIDRT


 HHN


   8/29/20 13:00


 9/3/20 12:59  9/2/20 08:30


 


 


 Chlorhexidine


 Gluconate


  (Beatrice-Hex 2%)  1 applic  DAILY@2000


 TOPIC


   8/31/20 20:00


 11/29/20 19:59  9/1/20 19:54


 


 


 Clotrimazole


  (Lotrimin)  1 applic  Q12HR


 TOPIC


   8/30/20 13:00


 11/28/20 12:59  9/2/20 08:21


 


 


 Daptomycin 350 mg/


 Sodium Chloride  55 ml @ 


 100 mls/hr  Q48H


 IV


   8/28/20 11:00


 9/5/20 10:59  9/1/20 11:09


 


 


 Dextrose


  (Dextrose 50%)  25 ml  Q30M  PRN


 IV


 Hypoglycemia  8/26/20 11:30


 11/20/20 11:29   


 


 


 Dextrose


  (Dextrose 50%)  50 ml  Q30M  PRN


 IV


 Hypoglycemia  8/26/20 11:30


 11/20/20 11:29   


 


 


 Hydrocortisone


  (Solu-CORTEF)  100 mg  EVERY 8  HOURS


 IV


   8/30/20 14:00


 11/28/20 13:59  9/2/20 05:45


 


 


 Levothyroxine


 Sodium


  (Synthroid)  75 mcg  DAILY


 GT


   8/27/20 09:00


 9/22/20 08:59  9/2/20 08:20


 


 


 Linezolid  300 ml @ 


 300 mls/hr  Q12H


 IVPB


   8/29/20 15:00


 9/5/20 14:59  9/2/20 02:47


 


 


 Meropenem 1 gm/


 Sodium Chloride  55 ml @ 


 110 mls/hr  Q12H


 IVPB


   8/26/20 16:00


 9/5/20 15:59  9/2/20 03:50


 


 


 Micafungin Sodium


 100 mg/Sodium


 Chloride  110 ml @ 


 110 mls/hr  Q24H


 IVPB


   8/27/20 14:00


 9/3/20 13:59  9/1/20 15:07


 


 


 Midodrine


  (Pro-Amatine)  10 mg  Q8HR


 GT


   8/28/20 14:00


 11/26/20 13:59  9/2/20 05:45


 


 


 Norepinephrine


 Bitartrate 16 mg/


 Dextrose  500 ml @ 0


 mls/hr  Q24H


 IV


   8/31/20 07:45


 9/29/20 12:59  9/2/20 08:20


 


 


 Ondansetron HCl


  (Zofran)  4 mg  Q6H  PRN


 IVP


 Nausea & Vomiting  8/26/20 12:00


 9/21/20 11:59   


 


 


 Pantoprazole


  (Protonix)  40 mg  DAILY


 IV


   8/27/20 09:00


 9/22/20 08:59  9/2/20 08:20


 


 


 Phenylephrine HCl


 50 mg/Dextrose  250 ml @ 0


 mls/hr  Q24H  PRN


 IV


 For hypotension  8/29/20 17:45


 9/28/20 17:44  8/31/20 01:49


 


 


 Polyethylene


 Glycol


  (Miralax)  17 gm  DAILYPRN  PRN


 GT


 Constipation  8/26/20 12:00


 9/21/20 11:59   


 


 


 Potassium Chloride  100 ml @ 


 50 mls/hr  ONCE  ONCE


 IVPB


   9/2/20 08:00


 9/2/20 09:59  9/2/20 08:19


 


 


 Sodium Chloride  1,000 ml @ 


 50 mls/hr  Q20H


 IV


   8/31/20 09:32


 9/30/20 09:31  9/1/20 22:51


 


 


 Valproic Acid


  (Depakene)  500 mg  EVERY 8  HOURS


 GT


   8/26/20 14:00


 9/21/20 21:59  9/2/20 05:45


 





Laboratory Tests


9/2/20 04:35: 


White Blood Count 10.3, Red Blood Count 2.43L, Hemoglobin 8.3L, Hematocrit 24.9L

, Mean Corpuscular Volume 102H, Mean Corpuscular Hemoglobin 34.1H, Mean 

Corpuscular Hemoglobin Concent 33.3, Red Cell Distribution Width 13.5, Platelet 

Count 73L, Mean Platelet Volume 8.6, Neutrophils (%) (Auto) , Lymphocytes (%) (

Auto) , Monocytes (%) (Auto) , Eosinophils (%) (Auto) , Basophils (%) (Auto) , 

Differential Total Cells Counted 100, Neutrophils % (Manual) 84H, Lymphocytes % 

(Manual) 6L, Monocytes % (Manual) 10, Eosinophils % (Manual) 0, Basophils % (

Manual) 0, Band Neutrophils 0, Nucleated Red Blood Cells 1, Platelet Estimate 

DecreasedL, Platelet Morphology Normal, Hypochromasia 2+, Anisocytosis 1+, 

Macrocytosis 1+, Sodium Level 143, Potassium Level 3.2L, Chloride Level 111H, 

Carbon Dioxide Level 19L, Anion Gap 13, Blood Urea Nitrogen 31H, Creatinine 2.0H

, Estimat Glomerular Filtration Rate 25.6, Glucose Level 109H, Calcium Level 

8.4L, Phosphorus Level 3.1, Magnesium Level 2.0, Total Bilirubin 0.4, Aspartate 

Amino Transf (AST/SGOT) 26, Alanine Aminotransferase (ALT/SGPT) 13, Alkaline 

Phosphatase 43L, Total Creatine Kinase 9L, Total Protein 4.3L, Albumin 1.6L, 

Globulin 2.7, Albumin/Globulin Ratio 0.6L


9/2/20 07:42: 


Arterial Blood pH 7.390, Arterial Blood Partial Pressure CO2 26.3L, Arterial 

Blood Partial Pressure O2 54.0L, Arterial Blood HCO3 15.6*L, Arterial Blood 

Oxygen Saturation 88.0*L, Arterial Blood Base Excess -8.2L, Cole Test Positive


Height (Feet):  5


Height (Inches):  3.00


Weight (Pounds):  172


General Appearance:  no apparent distress


Cardiovascular:  normal rate


Respiratory/Chest:  decreased breath sounds


Abdomen:  distended











Lam Nino MD Sep 2, 2020 09:23

## 2020-09-02 NOTE — NUR
NURSE NOTES:

Volume has not been delivered. 

Suctioned several times. 

RT and promary RN are at the bedside to fix the volume.

## 2020-09-02 NOTE — NUR
NURSE NOTES:

Order received for feeding to be increased to 50mL/hr with Marcio BID and Vitamin C. Feeding 
increased now to 30mL/hr. Will reassess for tolerance.

-------------------------------------------------------------------------------

Addendum: 09/02/20 at 1235 by CARLA NGUYEN RN

-------------------------------------------------------------------------------

Also reported at this time to Dr. Cherry, LT facial swelling, pitting edema and blue 
discoloration around joints of finger.

## 2020-09-02 NOTE — NUR
NURSE HAND-OFF REPORT: 



Latest Vital Signs: Temperature 98.2 , Pulse 73 , B/P 98 /44 , Respiratory Rate 30 , O2 SAT 
92 , Mechanical Ventilator, O2 Flow Rate 15.0 .  

Vital Sign Comment: 



EKG Rhythm: Sinus Rhythm

Rhythm change?: N 

MD Notified?: -

MD Response: 



Latest Momin Fall Score: 50  

Fall Risk: High Risk 

Safety Measures: Call light Within Reach, Bed Alarm Zone 2, Side Rails Side Rails x3, Bed 
position Low and Locked.

Fall Precautions: 

Yellow Socks

Door Sign

Patient Fall Education



Report given to Miranda CHANEL RN.

## 2020-09-02 NOTE — INFECTIOUS DISEASES PROG NOTE
Assessment/Plan








ASSESSMENT:


sp code blue 8/26





Septic Shock; pressors requirements improving


Fever, SP


Leukocytosis; SP


  -8/26 u/a no pyuria





Pneumonia.- COVID 19 neg x3


   Acute hypoxic resp failure on VM> NRB 15l 100%; hypoxic on ABG> now VDRF 8/26

- Fio2 80% >100% 8/28> 60% 9/1 >80% 9/2


         -9/1 sp cx NTD


         8/31 CXR: Extensive bilateral interstitial and airspace disease 

appears similar to the prior exam. Moderate to large bilateral pleural 

effusions appear unchanged.


   8/28 CXR: Increasing left upper lobe dense consolidation and likely 

increasing bilateral pleural fluid. Persistent diffuse dense consolidation 

elsewhere


            -8/26 sp cx normal resp lilliam


             -8/25 CXR: Increased atelectasis of the right lung, since prior 

exam of 3 days earlier. New or increased right pleural effusion. Increased left 

basilar consolidation and/or pleural fluid 


          -COVID Rapid PCR neg 8/23, 8/23, 8/26


          -8/22 spc x Group G strep


          -8/22 CXR:   Reduced lung volumes.  Patchy bilateral predominantly 

interstitial  pulmonary opacities.  Could be from edema and/or pneumonia.  

There is a broader differential.





Persistent, high grade bacteremia-  r/o endocarditis


     -8/22 Bcx 4/4 sets S. haemolyticus; 8/23 Bcx 3/4 S/ epi; 8/27 Bcx 1.4 S. 

warnerri; 8/29 Bcx NTD


     -2d echo: no vegetaions seen





          ua/ wbc 10-15, nit neg, leuk +1; ucx Neg





DAVID; 


 -supratherapeutic vanco levels





-Seizure disorder.


- Hypothyroidism.


- Down syndrome.





History of PEG tube placement.


[facility]





PLAN:


Dc empiric linezolid #5 no MRSA in sp cx and thrombocytopenia


Cont Daptomycin #9(abx d #12) for GPC bacteremia in view of DAVID [monitor CK, 

was 51 on 8/26]


Cont Meropenem#8 (abx d #12) given resp decompensation


Cont Micafungin #7/7





f/u Repeat BCx given persistent bacteremia


CT chest when stable enough, not currently given on FiO2 100% and pressors


If pleural effusions, should be tapped to r/o empyema, send for bacterial cx as 

well as cell count and diff


F/u cx of exudate around G-tube


Trend GIB (black stools)





   8/28 SP Azithromycin #7/7


   8/26 SP Ceftriaxone #2


   8/25 SP IV Vancomycin #4, Zosyn #4


   8/22 SP Cefepime x1, Flagyl x1





- Monitor CBC, BMP.


-f/u Cocci ab, CrAg


.f/u  Repeat cx


- COVID neg x3; ok to dc isolation as afebrile >48hrs, alternative diagnosis


- Monitor chest x-ray.


- Monitor the patient's clinical course and labs.  Based on those, we will do 

further recommendation.





Thank you, Dr. Cherry, for allowing me to participate in the care of


this patient.  I will follow the patient with you at this


hospitalization.








Discussed with RN





Subjective


Allergies:  


Coded Allergies:  


     No Known Allergies (Unverified , 1/8/19)


afebrile >48hrs


remains intubated, Fio2 down to 60%


levo down to 4 and felix down to 20





Objective





Last 24 Hour Vital Signs








  Date Time  Temp Pulse Resp B/P (MAP) Pulse Ox O2 Delivery O2 Flow Rate FiO2


 


9/2/20 11:53      Mechanical Ventilator  





      Mechanical Ventilator  


 


9/2/20 11:47        70


 


9/2/20 11:30  84 22 106/49 (68) 97   


 


9/2/20 11:15  78 26 104/46 (65) 99   


 


9/2/20 11:00  78 25 103/27 (52) 96   


 


9/2/20 10:45  67 29 103/49 (67) 99   


 


9/2/20 10:30  76 27 114/42 (66) 100   


 


9/2/20 10:15  76 28 95/56 (69) 98   


 


9/2/20 10:10  72 30 96/43 (60) 99   


 


9/2/20 10:05  72 30 96/43 (60) 99   


 


9/2/20 10:00  71 30 86/37 (53) 99   


 


9/2/20 10:00    86/37    


 


9/2/20 09:55    77/41    


 


9/2/20 09:55  73 30 77/41 (53) 100   


 


9/2/20 09:30  83 25 100/33 (55) 98   


 


9/2/20 09:29  68 31     60


 


9/2/20 09:00  76 29 101/57 (72) 95   


 


9/2/20 09:00    101/57    


 


9/2/20 08:52        80


 


9/2/20 08:30  70 26 104/55 (71) 94   


 


9/2/20 08:20    115/60    


 


9/2/20 08:00  63      


 


9/2/20 08:00 98.2 62 30 115/60 (78) 97   


 


9/2/20 08:00    115/60    


 


9/2/20 07:56        80


 


9/2/20 07:50      Mechanical Ventilator  





      Mechanical Ventilator  


 


9/2/20 07:47        60


 


9/2/20 07:30  64 29 107/58 (74) 94   


 


9/2/20 07:16  64 30     60


 


9/2/20 07:00    107/58    


 


9/2/20 07:00  68 29 110/50 (70) 92   


 


9/2/20 06:30  69 30 98/44 (62) 94   


 


9/2/20 06:00  73 30 96/49 (65) 92   


 


9/2/20 06:00    98/44    


 


9/2/20 05:30  83 32 99/47 (64) 94   


 


9/2/20 05:15  84 30     60


 


9/2/20 05:00  84 35 95/59 (71) 87   


 


9/2/20 05:00    99/47    


 


9/2/20 04:30  73 30 95/44 (61) 91   


 


9/2/20 04:00  80      


 


9/2/20 04:00      Mechanical Ventilator  





      Mechanical Ventilator  


 


9/2/20 04:00    95/44    


 


9/2/20 04:00        60


 


9/2/20 04:00 98.2 81 30 110/51 (70) 95   


 


9/2/20 03:30  75 28 111/50 (70) 94   


 


9/2/20 03:02  77 32     60


 


9/2/20 03:00    111/50    


 


9/2/20 03:00  67 30 129/59 (82) 95   


 


9/2/20 02:30  79 29 97/65 (76) 92   


 


9/2/20 02:00  72 30 120/65 (83) 96   


 


9/2/20 02:00    97/65    


 


9/2/20 01:30  74 31     60


 


9/2/20 01:30  82 30 121/94 (103) 91   


 


9/2/20 01:00    121/94    


 


9/2/20 01:00  70 30 116/51 (72) 94   


 


9/2/20 00:30  79 27 98/57 (71) 90   


 


9/2/20 00:00        60


 


9/2/20 00:00    98/57    


 


9/2/20 00:00      Mechanical Ventilator  





      Mechanical Ventilator  


 


9/2/20 00:00  72      


 


9/2/20 00:00 98.2 83 23 111/57 (75) 87   


 


9/1/20 23:30  71 30 96/54 (68) 93   


 


9/1/20 23:00  77 30 98/51 (67) 95   


 


9/1/20 23:00    96/54    


 


9/1/20 22:51  82 33     60


 


9/1/20 22:30  80 30 124/55 (78) 94   


 


9/1/20 22:00    124/55    


 


9/1/20 22:00  82 30 95/53 (67) 93   


 


9/1/20 21:30  84 29 104/45 (64) 92   


 


9/1/20 21:12  72 30     60


 


9/1/20 21:00    104/45    


 


9/1/20 21:00  79 27 100/50 (67) 94   


 


9/1/20 20:30  83 27 113/99 (104) 93   


 


9/1/20 20:00  72      


 


9/1/20 20:00 98.4 83 28 114/57 (76) 93   


 


9/1/20 20:00        60


 


9/1/20 20:00    113/99    


 


9/1/20 20:00      Mechanical Ventilator  





      Mechanical Ventilator  


 


9/1/20 19:30  68 30 111/90 (97) 93   


 


9/1/20 19:30  73 30  100 Mechanical Ventilator  60





  80 30     60


 


9/1/20 19:00    107/45    


 


9/1/20 18:45  73 30 107/45 (65) 93   


 


9/1/20 18:30 99.0 71 30 113/52 (72) 93   


 


9/1/20 18:01  82 27 116/58 (77) 90   


 


9/1/20 18:00    116/58    


 


9/1/20 17:30  81 26 109/51 (70) 91   


 


9/1/20 17:00  80 25 104/58 (73) 98   


 


9/1/20 17:00    104/48    


 


9/1/20 16:30  81 27 90/53 (65) 93   


 


9/1/20 16:00  72 30 98/42 (60) 93   


 


9/1/20 16:00      Mechanical Ventilator  





      Mechanical Ventilator  


 


9/1/20 16:00 99.0       


 


9/1/20 16:00    110/51    


 


9/1/20 16:00  72      


 


9/1/20 16:00        60


 


9/1/20 15:30  81 26 94/70 (78) 94   


 


9/1/20 15:02  71 30     60


 


9/1/20 15:00    105/50    


 


9/1/20 15:00  80 29 110/71 (84) 94   


 


9/1/20 14:30  74 29 97/71 (80) 96   


 


9/1/20 14:00  72 28 91/45 (60) 96   


 


9/1/20 14:00    91/70    


 


9/1/20 13:30  79 28 75/55 (62) 96   


 


9/1/20 13:00  77 29 93/53 (66) 96   


 


9/1/20 13:00    93/53    


 


9/1/20 12:32  73 30  98 Mechanical Ventilator  60





  75 30     60


 


9/1/20 12:30  74 29 84/47 (59) 96   


 


9/1/20 12:00      Mechanical Ventilator  





      Mechanical Ventilator  


 


9/1/20 12:00  70 28 94/60 (71) 95   


 


9/1/20 12:00    94/60    


 


9/1/20 12:00  70      








Height (Feet):  5


Height (Inches):  3.00


Weight (Pounds):  172


HEENT:  No pale conjunctivae.  No icterus. ETT in place


NECK:  No lymphadenopathy.


CHEST:  Coarse breathing sounds.


HEART:  S1 and S2.


ABDOMEN:  Soft.  PEG tube in place.


EXTREMITIES:  No cyanosis at this time .


SKIN: no rash





Microbiology








 Date/Time


Source Procedure


Growth Status


 


 


 9/1/20 16:14


Sputum Gram Stain


Pending Resulted


 


 9/1/20 16:14


Sputum Sputum Culture - Preliminary


NO GROWTH Resulted











Laboratory Tests








Test


  9/2/20


04:35 9/2/20


07:42


 


White Blood Count


  10.3 K/UL


(4.8-10.8) 


 


 


Red Blood Count


  2.43 M/UL


(4.20-5.40)  L 


 


 


Hemoglobin


  8.3 G/DL


(12.0-16.0)  L 


 


 


Hematocrit


  24.9 %


(37.0-47.0)  L 


 


 


Mean Corpuscular Volume


  102 FL (80-99)


H 


 


 


Mean Corpuscular Hemoglobin


  34.1 PG


(27.0-31.0)  H 


 


 


Mean Corpuscular Hemoglobin


Concent 33.3 G/DL


(32.0-36.0) 


 


 


Red Cell Distribution Width


  13.5 %


(11.6-14.8) 


 


 


Platelet Count


  73 K/UL


(150-450)  L 


 


 


Mean Platelet Volume


  8.6 FL


(6.5-10.1) 


 


 


Neutrophils (%) (Auto)


  % (45.0-75.0)


  


 


 


Lymphocytes (%) (Auto)


  % (20.0-45.0)


  


 


 


Monocytes (%) (Auto)  % (1.0-10.0)   


 


Eosinophils (%) (Auto)  % (0.0-3.0)   


 


Basophils (%) (Auto)  % (0.0-2.0)   


 


Differential Total Cells


Counted 100  


  


 


 


Neutrophils % (Manual) 84 % (45-75)  H 


 


Lymphocytes % (Manual) 6 % (20-45)  L 


 


Monocytes % (Manual) 10 % (1-10)   


 


Eosinophils % (Manual) 0 % (0-3)   


 


Basophils % (Manual) 0 % (0-2)   


 


Band Neutrophils 0 % (0-8)   


 


Nucleated Red Blood Cells 1 /100 WBC   


 


Platelet Estimate Decreased  L 


 


Platelet Morphology Normal   


 


Hypochromasia 2+   


 


Anisocytosis 1+   


 


Macrocytosis 1+   


 


Sodium Level


  143 MMOL/L


(136-145) 


 


 


Potassium Level


  3.2 MMOL/L


(3.5-5.1)  L 


 


 


Chloride Level


  111 MMOL/L


()  H 


 


 


Carbon Dioxide Level


  19 MMOL/L


(21-32)  L 


 


 


Anion Gap


  13 mmol/L


(5-15) 


 


 


Blood Urea Nitrogen


  31 mg/dL


(7-18)  H 


 


 


Creatinine


  2.0 MG/DL


(0.55-1.30)  H 


 


 


Estimat Glomerular Filtration


Rate 25.6 mL/min


(>60) 


 


 


Glucose Level


  109 MG/DL


()  H 


 


 


Calcium Level


  8.4 MG/DL


(8.5-10.1)  L 


 


 


Phosphorus Level


  3.1 MG/DL


(2.5-4.9) 


 


 


Magnesium Level


  2.0 MG/DL


(1.8-2.4) 


 


 


Total Bilirubin


  0.4 MG/DL


(0.2-1.0) 


 


 


Aspartate Amino Transf


(AST/SGOT) 26 U/L (15-37)


  


 


 


Alanine Aminotransferase


(ALT/SGPT) 13 U/L (12-78)


  


 


 


Alkaline Phosphatase


  43 U/L


()  L 


 


 


Total Creatine Kinase


  9 U/L ()


L 


 


 


Total Protein


  4.3 G/DL


(6.4-8.2)  L 


 


 


Albumin


  1.6 G/DL


(3.4-5.0)  L 


 


 


Globulin 2.7 g/dL   


 


Albumin/Globulin Ratio


  0.6 (1.0-2.7)


L 


 


 


Arterial Blood pH


  


  7.390


(7.350-7.450)


 


Arterial Blood Partial


Pressure CO2 


  26.3 mmHg


(35.0-45.0)  L


 


Arterial Blood Partial


Pressure O2 


  54.0 mmHg


(75.0-100.0)  L


 


Arterial Blood HCO3


  


  15.6 mmol/L


(22.0-26.0)  *L


 


Arterial Blood Oxygen


Saturation 


  88.0 %


()  *L


 


Arterial Blood Base Excess  -8.2 (-2-2)  L


 


Cole Test  Positive  











Current Medications








 Medications


  (Trade)  Dose


 Ordered  Sig/Didi


 Route


 PRN Reason  Start Time


 Stop Time Status Last Admin


Dose Admin


 


 Acetaminophen


  (Tylenol)  650 mg  Q4H  PRN


 GT


 FEVER  8/26/20 11:45


 9/25/20 11:44  8/29/20 23:15


 


 


 Albuterol/


 Ipratropium


  (Albuterol/


 Ipratropium)  3 ml  Q4H  PRN


 HHN


 Shortness of Breath  8/29/20 11:30


 9/3/20 11:29   


 


 


 Albuterol/


 Ipratropium


  (Albuterol/


 Ipratropium)  3 ml  TIDRT


 HHN


   8/29/20 13:00


 9/3/20 12:59  9/2/20 08:30


 


 


 Ascorbic Acid


  (Vitamin C)  250 mg  DAILY


 GT


   9/3/20 09:00


 10/3/20 08:59   


 


 


 Chlorhexidine


 Gluconate


  (Beatrice-Hex 2%)  1 applic  DAILY@2000


 TOPIC


   8/31/20 20:00


 11/29/20 19:59  9/1/20 19:54


 


 


 Clotrimazole


  (Lotrimin)  1 applic  Q12HR


 TOPIC


   8/30/20 13:00


 11/28/20 12:59  9/2/20 08:21


 


 


 Daptomycin 350 mg/


 Sodium Chloride  55 ml @ 


 100 mls/hr  Q48H


 IV


   8/28/20 11:00


 9/5/20 10:59  9/1/20 11:09


 


 


 Dextrose


  (Dextrose 50%)  25 ml  Q30M  PRN


 IV


 Hypoglycemia  8/26/20 11:30


 11/20/20 11:29   


 


 


 Dextrose


  (Dextrose 50%)  50 ml  Q30M  PRN


 IV


 Hypoglycemia  8/26/20 11:30


 11/20/20 11:29   


 


 


 Hydrocortisone


  (Solu-CORTEF)  100 mg  EVERY 8  HOURS


 IV


   8/30/20 14:00


 11/28/20 13:59  9/2/20 05:45


 


 


 Levothyroxine


 Sodium


  (Synthroid)  75 mcg  DAILY


 GT


   8/27/20 09:00


 9/22/20 08:59  9/2/20 08:20


 


 


 Linezolid  300 ml @ 


 300 mls/hr  Q12H


 IVPB


   8/29/20 15:00


 9/5/20 14:59  9/2/20 02:47


 


 


 Meropenem 1 gm/


 Sodium Chloride  55 ml @ 


 110 mls/hr  Q12H


 IVPB


   8/26/20 16:00


 9/5/20 15:59  9/2/20 03:50


 


 


 Micafungin Sodium


 100 mg/Sodium


 Chloride  110 ml @ 


 110 mls/hr  Q24H


 IVPB


   8/27/20 14:00


 9/3/20 13:59  9/1/20 15:07


 


 


 Midodrine


  (Pro-Amatine)  10 mg  Q8HR


 GT


   8/28/20 14:00


 11/26/20 13:59  9/2/20 05:45


 


 


 Norepinephrine


 Bitartrate 16 mg/


 Dextrose  500 ml @ 0


 mls/hr  Q24H


 IV


   8/31/20 07:45


 9/29/20 12:59  9/2/20 08:20


 


 


 Ondansetron HCl


  (Zofran)  4 mg  Q6H  PRN


 IVP


 Nausea & Vomiting  8/26/20 12:00


 9/21/20 11:59   


 


 


 Pantoprazole


  (Protonix)  40 mg  DAILY


 IV


   8/27/20 09:00


 9/22/20 08:59  9/2/20 08:20


 


 


 Phenylephrine HCl


 50 mg/Dextrose  250 ml @ 0


 mls/hr  Q24H  PRN


 IV


 For hypotension  8/29/20 17:45


 9/28/20 17:44  8/31/20 01:49


 


 


 Polyethylene


 Glycol


  (Miralax)  17 gm  DAILYPRN  PRN


 GT


 Constipation  8/26/20 12:00


 9/21/20 11:59   


 


 


 Sodium Chloride  1,000 ml @ 


 50 mls/hr  Q20H


 IV


   8/31/20 09:32


 9/30/20 09:31  9/1/20 22:51


 


 


 Valproic Acid


  (Depakene)  500 mg  EVERY 8  HOURS


 GT


   8/26/20 14:00


 9/21/20 21:59  9/2/20 05:45


 

















Rema Gomes M.D. Sep 2, 2020 12:03

## 2020-09-02 NOTE — NUR
NURSE NOTES:

Received report from LAYTON Davila. 

Pt opens eyes spontaneously; follow simple commands.

Pt is Afebrile, and SR on cardiac monitor 

Intubated on 8/27 with 7.5 ETT noted 22 cm at the lip line. Settings: AC/VC30  FiO2 
80% Peep 10

G-Tube noted, CDI. Jevity 1.2 at 50 cc/hr. HOLD now for residual 150cc per AMRN. 

Valle noted draining pale, yellow urine 50mL/hr

Skin: see assessment.

Pt has PICC CARLOS with double lumen, CDI 

Running NS at 50 mL/hr and Levophed at 2 mcg/min. 

Safe measures observed. Side rails up x 2 and padded per seizure precaution.

No acute distress noted. Will continue to monitor.

## 2020-09-02 NOTE — NUR
NURSE NOTES:

Suctioned and oral care provided.

Afebrile and VSS.

Feeding kept holding due to high residual. 

BP stable with Levo 4mcg/min.

Safety measures observed. Will continue to monitor.

## 2020-09-03 VITALS — DIASTOLIC BLOOD PRESSURE: 55 MMHG | SYSTOLIC BLOOD PRESSURE: 109 MMHG

## 2020-09-03 VITALS — DIASTOLIC BLOOD PRESSURE: 76 MMHG | SYSTOLIC BLOOD PRESSURE: 113 MMHG

## 2020-09-03 VITALS — DIASTOLIC BLOOD PRESSURE: 72 MMHG | SYSTOLIC BLOOD PRESSURE: 106 MMHG

## 2020-09-03 VITALS — SYSTOLIC BLOOD PRESSURE: 133 MMHG | DIASTOLIC BLOOD PRESSURE: 55 MMHG

## 2020-09-03 VITALS — DIASTOLIC BLOOD PRESSURE: 60 MMHG | SYSTOLIC BLOOD PRESSURE: 106 MMHG

## 2020-09-03 VITALS — SYSTOLIC BLOOD PRESSURE: 133 MMHG | DIASTOLIC BLOOD PRESSURE: 57 MMHG

## 2020-09-03 VITALS — SYSTOLIC BLOOD PRESSURE: 115 MMHG | DIASTOLIC BLOOD PRESSURE: 57 MMHG

## 2020-09-03 VITALS — DIASTOLIC BLOOD PRESSURE: 91 MMHG | SYSTOLIC BLOOD PRESSURE: 108 MMHG

## 2020-09-03 VITALS — DIASTOLIC BLOOD PRESSURE: 70 MMHG | SYSTOLIC BLOOD PRESSURE: 120 MMHG

## 2020-09-03 VITALS — DIASTOLIC BLOOD PRESSURE: 70 MMHG | SYSTOLIC BLOOD PRESSURE: 122 MMHG

## 2020-09-03 VITALS — SYSTOLIC BLOOD PRESSURE: 114 MMHG | DIASTOLIC BLOOD PRESSURE: 58 MMHG

## 2020-09-03 VITALS — SYSTOLIC BLOOD PRESSURE: 102 MMHG | DIASTOLIC BLOOD PRESSURE: 55 MMHG

## 2020-09-03 VITALS — SYSTOLIC BLOOD PRESSURE: 109 MMHG | DIASTOLIC BLOOD PRESSURE: 92 MMHG

## 2020-09-03 VITALS — DIASTOLIC BLOOD PRESSURE: 70 MMHG | SYSTOLIC BLOOD PRESSURE: 112 MMHG

## 2020-09-03 VITALS — SYSTOLIC BLOOD PRESSURE: 100 MMHG | DIASTOLIC BLOOD PRESSURE: 55 MMHG

## 2020-09-03 VITALS — DIASTOLIC BLOOD PRESSURE: 67 MMHG | SYSTOLIC BLOOD PRESSURE: 121 MMHG

## 2020-09-03 VITALS — SYSTOLIC BLOOD PRESSURE: 101 MMHG | DIASTOLIC BLOOD PRESSURE: 51 MMHG

## 2020-09-03 VITALS — SYSTOLIC BLOOD PRESSURE: 120 MMHG | DIASTOLIC BLOOD PRESSURE: 78 MMHG

## 2020-09-03 VITALS — SYSTOLIC BLOOD PRESSURE: 97 MMHG | DIASTOLIC BLOOD PRESSURE: 47 MMHG

## 2020-09-03 VITALS — SYSTOLIC BLOOD PRESSURE: 106 MMHG | DIASTOLIC BLOOD PRESSURE: 65 MMHG

## 2020-09-03 VITALS — DIASTOLIC BLOOD PRESSURE: 65 MMHG | SYSTOLIC BLOOD PRESSURE: 112 MMHG

## 2020-09-03 VITALS — DIASTOLIC BLOOD PRESSURE: 54 MMHG | SYSTOLIC BLOOD PRESSURE: 120 MMHG

## 2020-09-03 VITALS — SYSTOLIC BLOOD PRESSURE: 108 MMHG | DIASTOLIC BLOOD PRESSURE: 52 MMHG

## 2020-09-03 VITALS — SYSTOLIC BLOOD PRESSURE: 115 MMHG | DIASTOLIC BLOOD PRESSURE: 68 MMHG

## 2020-09-03 VITALS — SYSTOLIC BLOOD PRESSURE: 135 MMHG | DIASTOLIC BLOOD PRESSURE: 81 MMHG

## 2020-09-03 VITALS — DIASTOLIC BLOOD PRESSURE: 85 MMHG | SYSTOLIC BLOOD PRESSURE: 116 MMHG

## 2020-09-03 VITALS — SYSTOLIC BLOOD PRESSURE: 111 MMHG | DIASTOLIC BLOOD PRESSURE: 62 MMHG

## 2020-09-03 VITALS — DIASTOLIC BLOOD PRESSURE: 78 MMHG | SYSTOLIC BLOOD PRESSURE: 120 MMHG

## 2020-09-03 VITALS — DIASTOLIC BLOOD PRESSURE: 50 MMHG | SYSTOLIC BLOOD PRESSURE: 103 MMHG

## 2020-09-03 VITALS — DIASTOLIC BLOOD PRESSURE: 93 MMHG | SYSTOLIC BLOOD PRESSURE: 139 MMHG

## 2020-09-03 VITALS — DIASTOLIC BLOOD PRESSURE: 74 MMHG | SYSTOLIC BLOOD PRESSURE: 94 MMHG

## 2020-09-03 VITALS — SYSTOLIC BLOOD PRESSURE: 129 MMHG | DIASTOLIC BLOOD PRESSURE: 56 MMHG

## 2020-09-03 VITALS — SYSTOLIC BLOOD PRESSURE: 122 MMHG | DIASTOLIC BLOOD PRESSURE: 63 MMHG

## 2020-09-03 VITALS — DIASTOLIC BLOOD PRESSURE: 64 MMHG | SYSTOLIC BLOOD PRESSURE: 124 MMHG

## 2020-09-03 VITALS — DIASTOLIC BLOOD PRESSURE: 95 MMHG | SYSTOLIC BLOOD PRESSURE: 135 MMHG

## 2020-09-03 VITALS — DIASTOLIC BLOOD PRESSURE: 69 MMHG | SYSTOLIC BLOOD PRESSURE: 103 MMHG

## 2020-09-03 VITALS — DIASTOLIC BLOOD PRESSURE: 65 MMHG | SYSTOLIC BLOOD PRESSURE: 132 MMHG

## 2020-09-03 VITALS — SYSTOLIC BLOOD PRESSURE: 119 MMHG | DIASTOLIC BLOOD PRESSURE: 71 MMHG

## 2020-09-03 VITALS — SYSTOLIC BLOOD PRESSURE: 112 MMHG | DIASTOLIC BLOOD PRESSURE: 64 MMHG

## 2020-09-03 VITALS — SYSTOLIC BLOOD PRESSURE: 109 MMHG | DIASTOLIC BLOOD PRESSURE: 43 MMHG

## 2020-09-03 VITALS — SYSTOLIC BLOOD PRESSURE: 150 MMHG | DIASTOLIC BLOOD PRESSURE: 84 MMHG

## 2020-09-03 VITALS — DIASTOLIC BLOOD PRESSURE: 65 MMHG | SYSTOLIC BLOOD PRESSURE: 117 MMHG

## 2020-09-03 VITALS — SYSTOLIC BLOOD PRESSURE: 94 MMHG | DIASTOLIC BLOOD PRESSURE: 53 MMHG

## 2020-09-03 VITALS — DIASTOLIC BLOOD PRESSURE: 79 MMHG | SYSTOLIC BLOOD PRESSURE: 117 MMHG

## 2020-09-03 VITALS — DIASTOLIC BLOOD PRESSURE: 75 MMHG | SYSTOLIC BLOOD PRESSURE: 119 MMHG

## 2020-09-03 VITALS — DIASTOLIC BLOOD PRESSURE: 54 MMHG | SYSTOLIC BLOOD PRESSURE: 110 MMHG

## 2020-09-03 VITALS — SYSTOLIC BLOOD PRESSURE: 108 MMHG | DIASTOLIC BLOOD PRESSURE: 54 MMHG

## 2020-09-03 VITALS — DIASTOLIC BLOOD PRESSURE: 67 MMHG | SYSTOLIC BLOOD PRESSURE: 130 MMHG

## 2020-09-03 VITALS — DIASTOLIC BLOOD PRESSURE: 49 MMHG | SYSTOLIC BLOOD PRESSURE: 102 MMHG

## 2020-09-03 VITALS — SYSTOLIC BLOOD PRESSURE: 134 MMHG | DIASTOLIC BLOOD PRESSURE: 115 MMHG

## 2020-09-03 VITALS — SYSTOLIC BLOOD PRESSURE: 113 MMHG | DIASTOLIC BLOOD PRESSURE: 77 MMHG

## 2020-09-03 LAB
ADD MANUAL DIFF: YES
ALBUMIN SERPL-MCNC: 1.6 G/DL (ref 3.4–5)
ALBUMIN/GLOB SERPL: 0.5 {RATIO} (ref 1–2.7)
ALP SERPL-CCNC: 50 U/L (ref 46–116)
ALT SERPL-CCNC: 12 U/L (ref 12–78)
ANION GAP SERPL CALC-SCNC: 11 MMOL/L (ref 5–15)
AST SERPL-CCNC: 23 U/L (ref 15–37)
BILIRUB SERPL-MCNC: 0.5 MG/DL (ref 0.2–1)
BUN SERPL-MCNC: 37 MG/DL (ref 7–18)
CALCIUM SERPL-MCNC: 8.8 MG/DL (ref 8.5–10.1)
CHLORIDE SERPL-SCNC: 112 MMOL/L (ref 98–107)
CO2 SERPL-SCNC: 20 MMOL/L (ref 21–32)
CREAT SERPL-MCNC: 1.8 MG/DL (ref 0.55–1.3)
ERYTHROCYTE [DISTWIDTH] IN BLOOD BY AUTOMATED COUNT: 13.5 % (ref 11.6–14.8)
GLOBULIN SER-MCNC: 3.2 G/DL
HCT VFR BLD CALC: 26.9 % (ref 37–47)
HGB BLD-MCNC: 8.8 G/DL (ref 12–16)
MCV RBC AUTO: 102 FL (ref 80–99)
PHOSPHATE SERPL-MCNC: 3.9 MG/DL (ref 2.5–4.9)
PLATELET # BLD: 68 K/UL (ref 150–450)
POTASSIUM SERPL-SCNC: 3.4 MMOL/L (ref 3.5–5.1)
RBC # BLD AUTO: 2.63 M/UL (ref 4.2–5.4)
SODIUM SERPL-SCNC: 143 MMOL/L (ref 136–145)
WBC # BLD AUTO: 9.7 K/UL (ref 4.8–10.8)

## 2020-09-03 RX ADMIN — IPRATROPIUM BROMIDE AND ALBUTEROL SULFATE SCH ML: .5; 3 SOLUTION RESPIRATORY (INHALATION) at 07:14

## 2020-09-03 RX ADMIN — HYDROCORTISONE SODIUM SUCCINATE SCH MG: 100 INJECTION, POWDER, FOR SOLUTION INTRAMUSCULAR; INTRAVENOUS at 05:50

## 2020-09-03 RX ADMIN — MIDODRINE HYDROCHLORIDE SCH MG: 10 TABLET ORAL at 21:05

## 2020-09-03 RX ADMIN — MEROPENEM SCH MLS/HR: 1 INJECTION INTRAVENOUS at 03:09

## 2020-09-03 RX ADMIN — HYDROCORTISONE SODIUM SUCCINATE SCH MG: 100 INJECTION, POWDER, FOR SOLUTION INTRAMUSCULAR; INTRAVENOUS at 14:00

## 2020-09-03 RX ADMIN — VALPROIC ACID SCH MG: 250 SOLUTION ORAL at 14:00

## 2020-09-03 RX ADMIN — Medication SCH MG: at 09:53

## 2020-09-03 RX ADMIN — SODIUM CHLORIDE SCH MLS/HR: 900 INJECTION, SOLUTION INTRAVENOUS at 07:39

## 2020-09-03 RX ADMIN — HYDROCORTISONE SODIUM SUCCINATE SCH MG: 100 INJECTION, POWDER, FOR SOLUTION INTRAMUSCULAR; INTRAVENOUS at 21:05

## 2020-09-03 RX ADMIN — MIDODRINE HYDROCHLORIDE SCH MG: 10 TABLET ORAL at 14:00

## 2020-09-03 RX ADMIN — PANTOPRAZOLE SODIUM SCH MG: 40 INJECTION, POWDER, FOR SOLUTION INTRAVENOUS at 09:53

## 2020-09-03 RX ADMIN — MIDODRINE HYDROCHLORIDE SCH MG: 10 TABLET ORAL at 05:50

## 2020-09-03 RX ADMIN — VALPROIC ACID SCH MG: 250 SOLUTION ORAL at 21:05

## 2020-09-03 RX ADMIN — CHLORHEXIDINE GLUCONATE SCH APPLIC: 213 SOLUTION TOPICAL at 20:11

## 2020-09-03 RX ADMIN — MEROPENEM SCH MLS/HR: 1 INJECTION INTRAVENOUS at 16:00

## 2020-09-03 RX ADMIN — VALPROIC ACID SCH MG: 250 SOLUTION ORAL at 05:49

## 2020-09-03 RX ADMIN — DAPTOMYCIN SCH MLS/HR: 500 INJECTION, POWDER, LYOPHILIZED, FOR SOLUTION INTRAVENOUS at 11:25

## 2020-09-03 NOTE — NUR
NURSE NOTES:

Spoke with Dialysis nurse Diogo who will be dializing patient now. Also called pharmacy for 
scheduled cubicin.

## 2020-09-03 NOTE — NUR
CASE MANAGEMENT: REVIEW



09/02/20



SI: PNA . DOWN SYNDROME . SEIZURE DISORDER

97.5   67   30   112/91   100% MECH VENT FIO2 80

H/H 8.8/26.9   PLT 68    BUN/CREAT 37/1.8   ALB 1.6    K+3.4  

          ABG pCO2 26.3   pO2 54.0  HCO3 15.6   O2 SAT 94.5





IS: LEVOPHED QD PROTOCOL TITRATED

IV NS @50ML/HR

IV PHENYLEPHRINE QD PROTOCOL TITRATE 

DAPTOMYCIN Q48HR

ALBUTEROL HHN 

PRO-AMATINE GT TID

DEPAKENE GT TID



***ICU STATUS***

DCP: PATIENT IS FROM HOME [facility]

## 2020-09-03 NOTE — NUR
NURSE NOTES:

Patient received from Elena TRAYLOR. VSS with /78 HR 58. SB. Patient stable AOx0 with no 
s/sx of pain or distress. Patient still making eye contact and tracking. When asked to 
squeeze hand, patient is able to follow command. BL hand strength 2/5. Eyes SMITH but unable 
to determine size due to pupil shape. LT facial swelling improved. RR even and unlabored 
through ETT tube 7.5 lip line 22. Vent settings AC 30 500mL 80% P10. Cardiac sounds benign. 
BL lung sounds rales improved from yesterday. Pitting edema +2 on BL hands and feet with 
weak pulses to dorsalis pedis. Generalized edema +1 edema. Valle draining well to gravity 
with 25mL. LT UA PICC line dressing dry and intact, patent. IV fluids and Levophed at 4mcg 
infusing. Will monitor BP and titrate down if patient SBP is maintained above 110. Bowel 
sounds hypoactive. Gastric tube in place. Feeding held at this time prior to synthroid 
admiistration.Side rails upx2 call light within reach, bed low and locked. Will continue to 
monitor.

## 2020-09-03 NOTE — PULMONOLGY CRITICAL CARE NOTE
Critical Care - Asmt/Plan


Problems:  


(1) Acute respiratory failure


(2) Bacteremia


(3) Pneumonia


(4) Sepsis


(5) HCAP (healthcare-associated pneumonia)


(6) Seizure disorder


(7) Down's syndrome


Respiratory:  monitor respiratory rate, adjust FIO2, CXR


Cardiac:  continue to monitor HR/BP


Renal:  F/U I&O, keep IV fluid, check electrolytes


Infectious Disease:  check cultures


Gastrointestinal:  hold feedings


Endocrine:  check TSH, check HgA1C


Hematologic:  transfuse if hgb<8.5


Neurologic:  PRN Ativan, keep patient comfortable


Affect:  PRN ativan


Time Spent (Minutes):  40


Notes Reviewed:  cardio, renal


Discussed with:  nurses, consultants, 





Critical Care - Objective





Last 24 Hour Vital Signs








  Date Time  Temp Pulse Resp B/P (MAP) Pulse Ox O2 Delivery O2 Flow Rate FiO2


 


9/3/20 19:14  60 30     80


 


9/3/20 11:00  63 30 106/60 (75) 98   


 


9/3/20 10:30  63 30 110/54 (72) 99   


 


9/3/20 10:00  65 25 94/74 (81)    


 


9/3/20 09:30  66 27 119/71 (87) 91   


 


9/3/20 09:00  64 29 124/64 (84) 91   


 


9/3/20 08:30  67 25 129/56 (80) 95   


 


9/3/20 08:00      Mechanical Ventilator  





      Mechanical Ventilator  


 


9/3/20 08:00 98.6 64 29 112/65 (81) 96   


 


9/3/20 08:00  71      


 


9/3/20 08:00        80


 


9/3/20 07:45  70 28 139/93 (108) 97   


 


9/3/20 07:30  69 27 134/115 (121) 97   


 


9/3/20 07:05  60 30  97 Mechanical Ventilator  80





  65 30     


 


9/3/20 07:00  60 30 120/70 (87) 95   


 


9/3/20 07:00    120/70    


 


9/3/20 06:45  60 30 106/72 (83) 95   


 


9/3/20 06:30  63 27 112/64 (80) 96   


 


9/3/20 06:15  68 25 106/65 (79) 96   


 


9/3/20 06:00  65 29 114/58 (76) 95   


 


9/3/20 06:00    114/58    


 


9/3/20 05:45  61 30 120/54 (76) 98   


 


9/3/20 05:30  64 27 111/62 (78) 97   


 


9/3/20 05:15  64 28 120/78 (92) 98   


 


9/3/20 05:00  62 29 108/91 (97) 97   


 


9/3/20 05:00  69 30     80


 


9/3/20 05:00    108/91    


 


9/3/20 04:45  64 28 120/78 (92) 98   


 


9/3/20 04:30  63 29 132/65 (87) 97   


 


9/3/20 04:15  64 25 119/75 (90) 97   


 


9/3/20 04:00  67 25 113/77 (89) 97   


 


9/3/20 04:00        80


 


9/3/20 04:00  67      


 


9/3/20 04:00    113/77    


 


9/3/20 04:00      Mechanical Ventilator  





      Mechanical Ventilator  


 


9/3/20 03:45  64 27 135/95 (108) 97   


 


9/3/20 03:30  66 26 122/63 (82) 98   


 


9/3/20 03:15  69 32     80


 


9/3/20 03:15  72 25 135/81 (99) 97   


 


9/3/20 03:00  61 29 130/67 (88) 99   


 


9/3/20 03:00    130/67    


 


9/3/20 02:45  62 29 112/70 (84) 99   


 


9/3/20 02:30  68 27 116/85 (95) 99   


 


9/3/20 02:15  68 27 133/57 (82) 100   


 


9/3/20 02:00  64 27 115/57 (76) 100   


 


9/3/20 02:00    115/57    


 


9/3/20 01:45  73 31 109/43 (65) 99   


 


9/3/20 01:40  74 33     80


 


9/3/20 01:30  85 25 122/70 (87) 86   


 


9/3/20 01:15  67 27 115/68 (84) 98   


 


9/3/20 01:00    109/92    


 


9/3/20 01:00  69 23 109/92 (98) 98   


 


9/3/20 00:45  71 26 103/69 (80) 96   


 


9/3/20 00:30  66 28 121/67 (85) 99   


 


9/3/20 00:15  72 26 117/79 (92) 98   


 


9/3/20 00:00  74 24 150/84 (106) 96   


 


9/3/20 00:00        80


 


9/3/20 00:00    150/84    


 


9/3/20 00:00      Mechanical Ventilator  





      Mechanical Ventilator  


 


9/2/20 23:30  73 32     80


 


9/2/20 23:30  61 30 117/57 (77) 98   


 


9/2/20 23:15  78 22 125/67 (86) 97   


 


9/2/20 23:00  65 28 109/58 (75) 96   


 


9/2/20 23:00    109/58    


 


9/2/20 22:45  71 24 102/67 (79) 97   


 


9/2/20 22:30  75 24 110/88 (95) 97   


 


9/2/20 22:15  64 30 118/74 (89) 100   


 


9/2/20 22:00  72 30 98/68 (78) 100   


 


9/2/20 22:00    98/68    


 


9/2/20 21:45  74 27 97/57 (70) 100   


 


9/2/20 21:43  76 30     80


 


9/2/20 21:30  68 30 108/57 (74) 100   


 


9/2/20 21:30    108/57    


 


9/2/20 21:15  72 29 101/58 (72) 100   


 


9/2/20 21:15    101/58    


 


9/2/20 21:00  74 28 97/55 (69) 100   


 


9/2/20 21:00    97/55    


 


9/2/20 20:45  72 28 110/81 (91) 100   


 


9/2/20 20:45    110/81    


 


9/2/20 20:30  72 28 81/54 (63) 100   


 


9/2/20 20:30    81/54    


 


9/2/20 20:15  77 27 114/53 (73) 98   


 


9/2/20 20:00 97.5 67 30 112/91 (98) 100   


 


9/2/20 20:00    112/91    


 


9/2/20 20:00  67      


 


9/2/20 20:00      Mechanical Ventilator  





      Mechanical Ventilator  








Status:  sedated


Condition:  critical


HEENT:  atraumatic, normocephalic


Lungs:  rales, rhonchi


Heart:  HR/BP unstable


Abdomen:  soft, non-tender, feeding tube


Extremities:  edema


Micro:





Microbiology








 Date/Time


Source Procedure


Growth Status


 


 


 9/1/20 16:14


Sputum Gram Stain - Final Resulted


 


 9/1/20 16:14


Sputum Sputum Culture - Preliminary


NO GROWTH AFTER 24 HOURS Resulted








Accucheck:  131





Critical Care - Subjective


FI02:  80


Vent Support Breath Rate:  30


Vent Support Mode:  AC


Vent Tidal Volume:  500


Sputum Amount:  Small


PEEP:  10.0


PIP:  41


Tube Feeding Amount:  20


I&O:











Intake and Output  


 


 9/2/20 9/3/20





 19:00 07:00


 


Intake Total 1284.9555 ml 654.375 ml


 


Output Total 701 ml 410 ml


 


Balance 583.9555 ml 244.375 ml


 


  


 


Free Water  30 ml


 


IV Total 974.9555 ml 584.375 ml


 


Tube Feeding 310 ml 40 ml


 


Output Urine Total 700 ml 410 ml


 


Stool Total 1 ml 


 


# Bowel Movements 2 








ET-Tube:  7.5


ET Position:  22


Labs:





Laboratory Tests








Test


  9/3/20


04:10 9/3/20


07:10


 


White Blood Count


  9.7 K/UL


(4.8-10.8) 


 


 


Red Blood Count


  2.63 M/UL


(4.20-5.40)  L 


 


 


Hemoglobin


  8.8 G/DL


(12.0-16.0)  L 


 


 


Hematocrit


  26.9 %


(37.0-47.0)  L 


 


 


Mean Corpuscular Volume


  102 FL (80-99)


H 


 


 


Mean Corpuscular Hemoglobin


  33.7 PG


(27.0-31.0)  H 


 


 


Mean Corpuscular Hemoglobin


Concent 32.9 G/DL


(32.0-36.0) 


 


 


Red Cell Distribution Width


  13.5 %


(11.6-14.8) 


 


 


Platelet Count


  68 K/UL


(150-450)  L 


 


 


Mean Platelet Volume


  8.8 FL


(6.5-10.1) 


 


 


Neutrophils (%) (Auto)


  % (45.0-75.0)


  


 


 


Lymphocytes (%) (Auto)


  % (20.0-45.0)


  


 


 


Monocytes (%) (Auto)  % (1.0-10.0)   


 


Eosinophils (%) (Auto)  % (0.0-3.0)   


 


Basophils (%) (Auto)  % (0.0-2.0)   


 


Differential Total Cells


Counted 100  


  


 


 


Neutrophils % (Manual) 84 % (45-75)  H 


 


Lymphocytes % (Manual) 11 % (20-45)  L 


 


Monocytes % (Manual) 5 % (1-10)   


 


Eosinophils % (Manual) 0 % (0-3)   


 


Basophils % (Manual) 0 % (0-2)   


 


Band Neutrophils 0 % (0-8)   


 


Platelet Estimate Decreased  L 


 


Platelet Morphology Normal   


 


Hypochromasia 2+   


 


Anisocytosis 1+   


 


Macrocytosis 1+   


 


Sodium Level


  143 MMOL/L


(136-145) 


 


 


Potassium Level


  3.4 MMOL/L


(3.5-5.1)  L 


 


 


Chloride Level


  112 MMOL/L


()  H 


 


 


Carbon Dioxide Level


  20 MMOL/L


(21-32)  L 


 


 


Anion Gap


  11 mmol/L


(5-15) 


 


 


Blood Urea Nitrogen


  37 mg/dL


(7-18)  H 


 


 


Creatinine


  1.8 MG/DL


(0.55-1.30)  H 


 


 


Estimat Glomerular Filtration


Rate 28.9 mL/min


(>60) 


 


 


Glucose Level


  96 MG/DL


() 


 


 


Calcium Level


  8.8 MG/DL


(8.5-10.1) 


 


 


Phosphorus Level


  3.9 MG/DL


(2.5-4.9) 


 


 


Magnesium Level


  2.1 MG/DL


(1.8-2.4) 


 


 


Total Bilirubin


  0.5 MG/DL


(0.2-1.0) 


 


 


Aspartate Amino Transf


(AST/SGOT) 23 U/L (15-37)


  


 


 


Alanine Aminotransferase


(ALT/SGPT) 12 U/L (12-78)


  


 


 


Alkaline Phosphatase


  50 U/L


() 


 


 


Total Protein


  4.8 G/DL


(6.4-8.2)  L 


 


 


Albumin


  1.6 G/DL


(3.4-5.0)  L 


 


 


Globulin 3.2 g/dL   


 


Albumin/Globulin Ratio


  0.5 (1.0-2.7)


L 


 


 


Arterial Blood pH


  


  7.438


(7.350-7.450)


 


Arterial Blood Partial


Pressure CO2 


  23.3 mmHg


(35.0-45.0)  *L


 


Arterial Blood Partial


Pressure O2 


  77.1 mmHg


(75.0-100.0)


 


Arterial Blood HCO3


  


  15.4 mmol/L


(22.0-26.0)  *L


 


Arterial Blood Oxygen


Saturation 


  94.5 %


()  L


 


Arterial Blood Base Excess  -7.5 (-2-2)  L


 


Cole Test  Positive  

















Chano Cherry MD Sep 3, 2020 19:53

## 2020-09-03 NOTE — NUR
NURSE NOTES:

Suctioned and oral care provided

Afebrile and VSS

PICC line and IV sites are CDI

Valle is intact

Safety measures observed and will continue to monitor

## 2020-09-03 NOTE — NEPHROLOGY PROGRESS NOTE
Assessment/Plan


Problem List:  


(1) DAVID (acute kidney injury)


(2) Respiratory failure requiring intubation


(3) Down's syndrome


(4) Seizure disorder


(5) Hypothyroidism


Assessment





Acute renal failure, likely due to hypotension


Acute respiratory distress, hypoxia


Seizure disorder


Hypothyroidism


Down syndrome


Full code











Fluid challenge with IV fluids and albumin


Midodrine for BP above 100 systolic


Check TSH level


Check


Correct level


Monitor renal parameters


Urine studies


Per orders


Plan


September 3: Status quo.  Labs reviewed.  Electrolytes adjusted.  Serum 

creatinine down to 1.8.  Continue per consultants.


September 2: Status quo.  Labs reviewed.  Phosphorus supplement IV given.  

Serum creatinine 2.  Continue per consultants.


September 1: Requires less pressors.  Albumin bolus given.  1 dose of Lasix IV 

ordered as the patient severely edematous.  Patient serum albumin is very low.  

Continue per consultants.


August 31: Continues to be intubated.  Labs reviewed.  Serum creatinine 1.9 

unchanged.  Blood pressure more stable.  Off 1 of the pressors.  Continue to 

monitor renal parameters.  Continue per consultants.  Patient now on 

hydrocortisone 100 mg every 8 hours.  Will decrease IV fluid.  Normal saline 

down to 50 cc an hour.


August 30: Intubated.  Labs reviewed.  Creatinine 1.9 unchanged.  Continue same 

treatment plan.  Per consultants.  Overall poor prognosis since the patient 

remains on pressors and her pulmonary status is worsening.


August 29: Remains intubated.  Labs reviewed.  Creatinine 1.9.  Blood pressure 

systolic 90s.  Continue per consultants.


August 28: Remains intubated.  Labs reviewed.  Serum creatinine lower to 2.  

Vancomycin level lower.  Remains hypotensive on pressors.  Will increase 

midodrine to 10 mg every 8 hours.  Continue per consultants.  Continue to 

monitor renal parameters.


August 27: Patient now in ICU.  Intubated.  On pressors.  Labs reviewed.  Will 

increase midodrine.  Aim to keep blood pressure over 100 systolic.  Will give 

albumin bolus.  Will check vancomycin level which was elevated when checked 

previously on August 24.  Will monitor renal parameters.  Continue per 

consultants.





Subjective


ROS Limited/Unobtainable:  Yes





Objective


Objective





Last 24 Hour Vital Signs








  Date Time  Temp Pulse Resp B/P (MAP) Pulse Ox O2 Delivery O2 Flow Rate FiO2


 


9/3/20 11:00  63 30 106/60 (75) 98   


 


9/3/20 10:30  63 30 110/54 (72) 99   


 


9/3/20 10:00  65 25 94/74 (81)    


 


9/3/20 09:30  66 27 119/71 (87) 91   


 


9/3/20 09:00  64 29 124/64 (84) 91   


 


9/3/20 08:30  67 25 129/56 (80) 95   


 


9/3/20 08:00      Mechanical Ventilator  





      Mechanical Ventilator  


 


9/3/20 08:00 98.6 64 29 112/65 (81) 96   


 


9/3/20 08:00  71      


 


9/3/20 08:00        80


 


9/3/20 07:45  70 28 139/93 (108) 97   


 


9/3/20 07:30  69 27 134/115 (121) 97   


 


9/3/20 07:05  60 30  97 Mechanical Ventilator  80





  65 30     


 


9/3/20 07:00  60 30 120/70 (87) 95   


 


9/3/20 07:00    120/70    


 


9/3/20 06:45  60 30 106/72 (83) 95   


 


9/3/20 06:30  63 27 112/64 (80) 96   


 


9/3/20 06:15  68 25 106/65 (79) 96   


 


9/3/20 06:00  65 29 114/58 (76) 95   


 


9/3/20 06:00    114/58    


 


9/3/20 05:45  61 30 120/54 (76) 98   


 


9/3/20 05:30  64 27 111/62 (78) 97   


 


9/3/20 05:15  64 28 120/78 (92) 98   


 


9/3/20 05:00  62 29 108/91 (97) 97   


 


9/3/20 05:00  69 30     80


 


9/3/20 05:00    108/91    


 


9/3/20 04:45  64 28 120/78 (92) 98   


 


9/3/20 04:30  63 29 132/65 (87) 97   


 


9/3/20 04:15  64 25 119/75 (90) 97   


 


9/3/20 04:00  67 25 113/77 (89) 97   


 


9/3/20 04:00        80


 


9/3/20 04:00  67      


 


9/3/20 04:00    113/77    


 


9/3/20 04:00      Mechanical Ventilator  





      Mechanical Ventilator  


 


9/3/20 03:45  64 27 135/95 (108) 97   


 


9/3/20 03:30  66 26 122/63 (82) 98   


 


9/3/20 03:15  69 32     80


 


9/3/20 03:15  72 25 135/81 (99) 97   


 


9/3/20 03:00  61 29 130/67 (88) 99   


 


9/3/20 03:00    130/67    


 


9/3/20 02:45  62 29 112/70 (84) 99   


 


9/3/20 02:30  68 27 116/85 (95) 99   


 


9/3/20 02:15  68 27 133/57 (82) 100   


 


9/3/20 02:00  64 27 115/57 (76) 100   


 


9/3/20 02:00    115/57    


 


9/3/20 01:45  73 31 109/43 (65) 99   


 


9/3/20 01:40  74 33     80


 


9/3/20 01:30  85 25 122/70 (87) 86   


 


9/3/20 01:15  67 27 115/68 (84) 98   


 


9/3/20 01:00    109/92    


 


9/3/20 01:00  69 23 109/92 (98) 98   


 


9/3/20 00:45  71 26 103/69 (80) 96   


 


9/3/20 00:30  66 28 121/67 (85) 99   


 


9/3/20 00:15  72 26 117/79 (92) 98   


 


9/3/20 00:00  74 24 150/84 (106) 96   


 


9/3/20 00:00        80


 


9/3/20 00:00    150/84    


 


9/3/20 00:00      Mechanical Ventilator  





      Mechanical Ventilator  


 


9/2/20 23:30  73 32     80


 


9/2/20 23:30  61 30 117/57 (77) 98   


 


9/2/20 23:15  78 22 125/67 (86) 97   


 


9/2/20 23:00  65 28 109/58 (75) 96   


 


9/2/20 23:00    109/58    


 


9/2/20 22:45  71 24 102/67 (79) 97   


 


9/2/20 22:30  75 24 110/88 (95) 97   


 


9/2/20 22:15  64 30 118/74 (89) 100   


 


9/2/20 22:00  72 30 98/68 (78) 100   


 


9/2/20 22:00    98/68    


 


9/2/20 21:45  74 27 97/57 (70) 100   


 


9/2/20 21:43  76 30     80


 


9/2/20 21:30  68 30 108/57 (74) 100   


 


9/2/20 21:30    108/57    


 


9/2/20 21:15  72 29 101/58 (72) 100   


 


9/2/20 21:15    101/58    


 


9/2/20 21:00  74 28 97/55 (69) 100   


 


9/2/20 21:00    97/55    


 


9/2/20 20:45  72 28 110/81 (91) 100   


 


9/2/20 20:45    110/81    


 


9/2/20 20:30  72 28 81/54 (63) 100   


 


9/2/20 20:30    81/54    


 


9/2/20 20:15  77 27 114/53 (73) 98   


 


9/2/20 20:00 97.5 67 30 112/91 (98) 100   


 


9/2/20 20:00    112/91    


 


9/2/20 20:00  67      


 


9/2/20 20:00      Mechanical Ventilator  





      Mechanical Ventilator  


 


9/2/20 19:45  79 20 115/96 (102) 95   


 


9/2/20 19:45  76 32  100 Mechanical Ventilator  80





  79 30     80


 


9/2/20 19:30  59 30 89/67 (74) 100   


 


9/2/20 19:15  64 29 117/60 (79) 100   


 


9/2/20 19:00    95/46    


 


9/2/20 19:00  66 29 95/46 (62) 100   


 


9/2/20 18:45  69 29 108/54 (72) 99   


 


9/2/20 18:30    123/96    


 


9/2/20 18:30  75 29 123/96 (105) 98   


 


9/2/20 18:15  64 30 116/72 (87) 98   


 


9/2/20 18:00    102/61    


 


9/2/20 18:00  64 30 102/61 (75) 100   


 


9/2/20 17:45  71 30 104/57 (73) 100   


 


9/2/20 17:30        80


 


9/2/20 17:30  64 30 112/45 (67) 100   


 


9/2/20 17:29  62 30     80


 


9/2/20 17:15    127/91    


 


9/2/20 17:15  80 26 127/91 (103) 100   


 


9/2/20 17:00    125/61    


 


9/2/20 17:00  64 30 125/61 (82) 100   


 


9/2/20 16:30  70 29 136/71 (92) 100   


 


9/2/20 16:05        100


 


9/2/20 16:00  66      


 


9/2/20 16:00      Mechanical Ventilator  





      Mechanical Ventilator  


 


9/2/20 16:00 97.8 65 30 118/53 (74) 97   


 


9/2/20 16:00    118/53    


 


9/2/20 15:30  66 30 114/52 (72) 97   


 


9/2/20 15:00    112/51    


 


9/2/20 15:00  69 30 112/50 (70) 97   


 


9/2/20 15:00        60


 


9/2/20 14:40  74 30  97 Mechanical Ventilator  60





  71 30     60


 


9/2/20 14:30  77 23 118/79 (92) 98   


 


9/2/20 14:01  83 20 99/48 (65) 97   


 


9/2/20 14:00    99/48    


 


9/2/20 13:30  73 24 100/53 (69) 95   


 


9/2/20 13:16  76 33     70


 


9/2/20 13:00  63 30 109/54 (72) 96   


 


9/2/20 13:00    109/54    


 


9/2/20 12:30  66 30 110/52 (71) 97   

















Intake and Output  


 


 9/2/20 9/3/20





 19:00 07:00


 


Intake Total 1284.9555 ml 654.375 ml


 


Output Total 701 ml 410 ml


 


Balance 583.9555 ml 244.375 ml


 


  


 


Free Water  30 ml


 


IV Total 974.9555 ml 584.375 ml


 


Tube Feeding 310 ml 40 ml


 


Output Urine Total 700 ml 410 ml


 


Stool Total 1 ml 


 


# Bowel Movements 2 








Laboratory Tests


9/3/20 04:10: 


White Blood Count 9.7, Red Blood Count 2.63L, Hemoglobin 8.8L, Hematocrit 26.9L

, Mean Corpuscular Volume 102H, Mean Corpuscular Hemoglobin 33.7H, Mean 

Corpuscular Hemoglobin Concent 32.9, Red Cell Distribution Width 13.5, Platelet 

Count 68L, Mean Platelet Volume 8.8, Neutrophils (%) (Auto) , Lymphocytes (%) (

Auto) , Monocytes (%) (Auto) , Eosinophils (%) (Auto) , Basophils (%) (Auto) , 

Differential Total Cells Counted 100, Neutrophils % (Manual) 84H, Lymphocytes % 

(Manual) 11L, Monocytes % (Manual) 5, Eosinophils % (Manual) 0, Basophils % (

Manual) 0, Band Neutrophils 0, Platelet Estimate DecreasedL, Platelet 

Morphology Normal, Hypochromasia 2+, Anisocytosis 1+, Macrocytosis 1+, Sodium 

Level 143, Potassium Level 3.4L, Chloride Level 112H, Carbon Dioxide Level 20L, 

Anion Gap 11, Blood Urea Nitrogen 37H, Creatinine 1.8H, Estimat Glomerular 

Filtration Rate 28.9, Glucose Level 96, Calcium Level 8.8, Phosphorus Level 3.9

, Magnesium Level 2.1, Total Bilirubin 0.5, Aspartate Amino Transf (AST/SGOT) 23

, Alanine Aminotransferase (ALT/SGPT) 12, Alkaline Phosphatase 50, Total 

Protein 4.8L, Albumin 1.6L, Globulin 3.2, Albumin/Globulin Ratio 0.5L


9/3/20 07:10: 


Arterial Blood pH 7.438, Arterial Blood Partial Pressure CO2 23.3*L, Arterial 

Blood Partial Pressure O2 77.1, Arterial Blood HCO3 15.4*L, Arterial Blood 

Oxygen Saturation 94.5L, Arterial Blood Base Excess -7.5L, Cole Test Positive


Height (Feet):  5


Height (Inches):  3.00


Weight (Pounds):  172


General Appearance:  no apparent distress


EENT:  other - Patient is vented


Cardiovascular:  normal rate


Respiratory/Chest:  decreased breath sounds


Abdomen:  distended











Lam Nino MD Sep 3, 2020 12:07

## 2020-09-03 NOTE — NUR
NURSE NOTES:

Pt provided with a CHG bed bath, oral care and linen change. Anchorfast changed without 
incident due to soiling. ROM exercises performed per pt tolerance. Pt tolerated care well. 
Bedside assessment performed, assessed pt with a FLACC scale of 0 noted. VS obtained and 
remain stable at this time. Levophed continues to infuse at 2 mcg/min with no adverse 
effects noted from medication administration.  VS obtained and SBP remains >90 at this time. 
Fall, Aspiration and Skin precautions observed. Pt remains resting in bed; Bed remains in 
the lowest position with the safety wheels engaged, call light within reach, side rails up 
x3 and bed alarm activated. Will continue plan of care. Will continue to monitor.

## 2020-09-03 NOTE — NUR
NURSE NOTES:

Am care provided

Changed soiled gowns, linens, and sliders

Moderate soft BM noted

Prophylaxis Optiform applied on sacram

Suctioned and oral care provided

Afebrile and VSS

PICC line and IV sites are CDI

Valle is intact

Safety measures observed and will continue to monitor

## 2020-09-03 NOTE — NUR
NURSE NOTES:

Bedside assessment performed, assessed pt with a FLACC scale of 0 noted. VS obtained and 
remain stable at this time. Levophed continues to infuse at 2 mcg/min with no adverse 
effects noted. SBP remains >90 at this time, will continue to monitor and titrate 
accordingly. NS continues infusing at 50 mL/hr as ordered without incident. GT residual 
noted to decrease from 30mL to 10mL, will reassess and increase infusion rate as indicated 
towards goal. Fall, Aspiration, Seizure and Skin precautions observed. Pt remains resting in 
bed; Bed remains in the lowest position with the safety wheels engaged, call light within 
reach, side rails up x3 and bed alarm activated. Will continue plan of care. Will continue 
to monitor.

## 2020-09-03 NOTE — NUR
RD ASSESSMENT & RECOMMENDATIONS

SEE CARE ACTIVITY FOR COMPLETE ASSESSMENT



DAILY ESTIMATED NEEDS:

Needs based on CRITICAL CARE, 50kg  

22-27  kcals/kg 

2992-7019  total kcals

1.25-2  g protein/kg

  g total protein 

25-30  mL/kg

3995-5824  total fluid mLs



NUTRITION DIAGNOSIS:

Swallowing difficulty r/t dysphagia as evdienced by pt w/ Downs Syndrome,

PEG dep for all nutritional needs, now intubated, pressor support, ICU

status.



 



CURRENT TF: Jevity 1.2 @50ml/hr x 22 hrs 



 





ENTERAL NUTRITION RECOMMENDATIONS:

VITAL AF 1.2 @ 50ml/hr x22 hrs   to provide 1100ml, 1320 kcal, 83g pro, 892ml free H2O  



- Rec VITAL AF 1.2 for critical care, high pro needs, possible improved tolerance.

- Initiate @ 20ml/hr x 6hrs, advance as tolerated to goal w/ hemodynamic stability.

- Hold TF 1 hr before and after Synthroid med 

- Flush per MD, HOB over 30 degrees.

-------------

***WITHOUT HEMODYANAMIC STABILITY, rec trophic feeds of Vital AF 1.2 @ 10ml/hr***



 



ADDITIONAL RECOMMENDATIONS:

1) Ht of 61 inches per SNF; recalibrate bed scale for accurate CBW  

2) Monitor BG closely w/ Solucortef, need for NISS 

3) Monitor hemodynamic stability: on pressors x 2, titrating down 

        -> rec trophic feeds while not hemodyanmically stable 

4) Wound healing: Continue Vit C 250mg QD + Marcio BID 

5) Monitor lytes, replete as needed 

6) Rec prokinetics w/ continued residuals.

## 2020-09-03 NOTE — NUR
NURSE NOTES:

Suctioned and oral care provided

Afebrile and VSS

PICC line and IV sites are CDI

Valle is intact

GT intact. Residual 25mL. feeding started.

Safety measures observed and will continue to monitor

## 2020-09-03 NOTE — NUR
NURSE HAND-OFF REPORT: 



Latest Vital Signs: Temperature 98.2 , Pulse 54 , B/P 133 /65 , Respiratory Rate 30 , O2 SAT 
100 , Mechanical Ventilator, O2 Flow Rate 15.0 .  

Vital Sign Comment: STABLE



EKG Rhythm: Sinus Bradycardia

Rhythm change?: Y

MD Notified?: Dr. Joslyn MENDENHALL Response: Order for EKG. Aware of results. No new orders.



Latest Momin Fall Score: 50  

Fall Risk: High Risk 

Safety Measures: Call light Within Reach, Bed Alarm Zone 2, Side Rails Side Rails x3, Bed 
position Low and Locked.

Fall Precautions: 

Patient Fall Education



Report given to Caterina RN. Patient stable. Plan of care endorsed. Aware that sacral picture 
not taken.

## 2020-09-03 NOTE — INTERNAL MED PROGRESS NOTE
Subjective


Physician Name


Tyson Hung


Attending Physician


Chano Cherry MD





Current Medications








 Medications


  (Trade)  Dose


 Ordered  Sig/Didi


 Route


 PRN Reason  Start Time


 Stop Time Status Last Admin


Dose Admin


 


 Acetaminophen


  (Tylenol)  650 mg  Q4H  PRN


 GT


 FEVER  8/26/20 11:45


 9/25/20 11:44  8/29/20 23:15


 


 


 Ascorbic Acid


  (Vitamin C)  250 mg  DAILY


 GT


   9/3/20 09:00


 10/3/20 08:59  9/3/20 09:53


 


 


 Chlorhexidine


 Gluconate


  (Beatrice-Hex 2%)  1 applic  DAILY@2000


 TOPIC


   8/31/20 20:00


 11/29/20 19:59  9/3/20 20:11


 


 


 Clotrimazole


  (Lotrimin)  1 applic  Q12HR


 TOPIC


   8/30/20 13:00


 11/28/20 12:59  9/3/20 20:11


 


 


 Daptomycin 350 mg/


 Sodium Chloride  55 ml @ 


 100 mls/hr  Q48H


 IV


   8/28/20 11:00


 9/5/20 10:59  9/3/20 11:25


 


 


 Dextrose


  (Dextrose 50%)  25 ml  Q30M  PRN


 IV


 Hypoglycemia  8/26/20 11:30


 11/20/20 11:29   


 


 


 Dextrose


  (Dextrose 50%)  50 ml  Q30M  PRN


 IV


 Hypoglycemia  8/26/20 11:30


 11/20/20 11:29   


 


 


 Hydrocortisone


  (Solu-CORTEF)  100 mg  EVERY 8  HOURS


 IV


   8/30/20 14:00


 11/28/20 13:59  9/3/20 21:05


 


 


 Levothyroxine


 Sodium


  (Synthroid)  75 mcg  DAILY


 GT


   8/27/20 09:00


 9/22/20 08:59  9/3/20 09:53


 


 


 Meropenem 1 gm/


 Sodium Chloride  55 ml @ 


 110 mls/hr  Q12H


 IVPB


   8/26/20 16:00


 9/5/20 15:59  9/3/20 03:09


 


 


 Midodrine


  (Pro-Amatine)  10 mg  Q8HR


 GT


   8/28/20 14:00


 11/26/20 13:59  9/3/20 21:05


 


 


 Norepinephrine


 Bitartrate 16 mg/


 Dextrose  500 ml @ 0


 mls/hr  Q24H


 IV


   8/31/20 07:45


 9/29/20 12:59  9/2/20 08:20


 


 


 Ondansetron HCl


  (Zofran)  4 mg  Q6H  PRN


 IVP


 Nausea & Vomiting  8/26/20 12:00


 9/21/20 11:59   


 


 


 Pantoprazole


  (Protonix)  40 mg  DAILY


 IV


   8/27/20 09:00


 9/22/20 08:59  9/3/20 09:53


 


 


 Phenylephrine HCl


 50 mg/Dextrose  250 ml @ 0


 mls/hr  Q24H  PRN


 IV


 For hypotension  8/29/20 17:45


 9/28/20 17:44  8/31/20 01:49


 


 


 Polyethylene


 Glycol


  (Miralax)  17 gm  DAILYPRN  PRN


 GT


 Constipation  8/26/20 12:00


 9/21/20 11:59   


 


 


 Sodium Chloride  1,000 ml @ 


 50 mls/hr  Q20H


 IV


   8/31/20 09:32


 9/30/20 09:31  9/3/20 06:46


 


 


 Valproic Acid


  (Depakene)  500 mg  EVERY 8  HOURS


 GT


   8/26/20 14:00


 9/21/20 21:59  9/3/20 21:05


 








Allergies:  


Coded Allergies:  


     No Known Allergies (Unverified , 1/8/19)


Subjective


in ICU, remained intubated on the vent, open eyes, unable to follow command. WBC

: 9.7. Renal function improving.





Objective





Last Vital Signs








  Date Time  Temp Pulse Resp B/P (MAP) Pulse Ox O2 Delivery O2 Flow Rate FiO2


 


9/3/20 22:54  65 30     80


 


9/3/20 22:30    109/55 (73) 100   


 


9/3/20 20:00      Mechanical Ventilator  





      Mechanical Ventilator  


 


9/3/20 20:00 98.2       


 


8/27/20 00:00       15.0 











Laboratory Tests








Test


  9/3/20


04:10 9/3/20


07:10


 


White Blood Count


  9.7 K/UL


(4.8-10.8) 


 


 


Red Blood Count


  2.63 M/UL


(4.20-5.40)  L 


 


 


Hemoglobin


  8.8 G/DL


(12.0-16.0)  L 


 


 


Hematocrit


  26.9 %


(37.0-47.0)  L 


 


 


Mean Corpuscular Volume


  102 FL (80-99)


H 


 


 


Mean Corpuscular Hemoglobin


  33.7 PG


(27.0-31.0)  H 


 


 


Mean Corpuscular Hemoglobin


Concent 32.9 G/DL


(32.0-36.0) 


 


 


Red Cell Distribution Width


  13.5 %


(11.6-14.8) 


 


 


Platelet Count


  68 K/UL


(150-450)  L 


 


 


Mean Platelet Volume


  8.8 FL


(6.5-10.1) 


 


 


Neutrophils (%) (Auto)


  % (45.0-75.0)


  


 


 


Lymphocytes (%) (Auto)


  % (20.0-45.0)


  


 


 


Monocytes (%) (Auto)  % (1.0-10.0)   


 


Eosinophils (%) (Auto)  % (0.0-3.0)   


 


Basophils (%) (Auto)  % (0.0-2.0)   


 


Differential Total Cells


Counted 100  


  


 


 


Neutrophils % (Manual) 84 % (45-75)  H 


 


Lymphocytes % (Manual) 11 % (20-45)  L 


 


Monocytes % (Manual) 5 % (1-10)   


 


Eosinophils % (Manual) 0 % (0-3)   


 


Basophils % (Manual) 0 % (0-2)   


 


Band Neutrophils 0 % (0-8)   


 


Platelet Estimate Decreased  L 


 


Platelet Morphology Normal   


 


Hypochromasia 2+   


 


Anisocytosis 1+   


 


Macrocytosis 1+   


 


Sodium Level


  143 MMOL/L


(136-145) 


 


 


Potassium Level


  3.4 MMOL/L


(3.5-5.1)  L 


 


 


Chloride Level


  112 MMOL/L


()  H 


 


 


Carbon Dioxide Level


  20 MMOL/L


(21-32)  L 


 


 


Anion Gap


  11 mmol/L


(5-15) 


 


 


Blood Urea Nitrogen


  37 mg/dL


(7-18)  H 


 


 


Creatinine


  1.8 MG/DL


(0.55-1.30)  H 


 


 


Estimat Glomerular Filtration


Rate 28.9 mL/min


(>60) 


 


 


Glucose Level


  96 MG/DL


() 


 


 


Calcium Level


  8.8 MG/DL


(8.5-10.1) 


 


 


Phosphorus Level


  3.9 MG/DL


(2.5-4.9) 


 


 


Magnesium Level


  2.1 MG/DL


(1.8-2.4) 


 


 


Total Bilirubin


  0.5 MG/DL


(0.2-1.0) 


 


 


Aspartate Amino Transf


(AST/SGOT) 23 U/L (15-37)


  


 


 


Alanine Aminotransferase


(ALT/SGPT) 12 U/L (12-78)


  


 


 


Alkaline Phosphatase


  50 U/L


() 


 


 


Total Protein


  4.8 G/DL


(6.4-8.2)  L 


 


 


Albumin


  1.6 G/DL


(3.4-5.0)  L 


 


 


Globulin 3.2 g/dL   


 


Albumin/Globulin Ratio


  0.5 (1.0-2.7)


L 


 


 


Arterial Blood pH


  


  7.438


(7.350-7.450)


 


Arterial Blood Partial


Pressure CO2 


  23.3 mmHg


(35.0-45.0)  *L


 


Arterial Blood Partial


Pressure O2 


  77.1 mmHg


(75.0-100.0)


 


Arterial Blood HCO3


  


  15.4 mmol/L


(22.0-26.0)  *L


 


Arterial Blood Oxygen


Saturation 


  94.5 %


()  L


 


Arterial Blood Base Excess  -7.5 (-2-2)  L


 


Cole Test  Positive  











Microbiology








 Date/Time


Source Procedure


Growth Status


 


 


 9/1/20 16:14


Sputum Gram Stain - Final Resulted


 


 9/1/20 16:14


Sputum Sputum Culture - Preliminary


NO GROWTH AFTER 24 HOURS Resulted

















Intake and Output  


 


 9/2/20 9/3/20





 19:00 07:00


 


Intake Total 1284.9555 ml 654.375 ml


 


Output Total 701 ml 410 ml


 


Balance 583.9555 ml 244.375 ml


 


  


 


Free Water  30 ml


 


IV Total 974.9555 ml 584.375 ml


 


Tube Feeding 310 ml 40 ml


 


Output Urine Total 700 ml 410 ml


 


Stool Total 1 ml 


 


# Bowel Movements 2 








Objective


General: remain intubated, unable to follow command, open eyes.


HEENT: NCAT, sclera anicteric, PERRL, ET Tube.


Neck: Supple, no significant jugular venous distention, 


Lungs: mechanical breath sounds decreased air at the bases,  no Wheeze or Rales.


Heart: Regular rate and rhythm, normal S1/S2, no murmurs/gallops


Abdomen: soft, not tender, not distended. + bowel sounds, Morbid Obesity, PEG 

site intact.


Extremities: No Cyanosis , clubbing,  upper extremities edema. left upper 

extremity PICC line.


Neuro: limited secondary to patient status, unable to follow command, bilateral 

upper and lower extremities contracted.





Assessment/Plan


Assessment/Plan


Problem List:  


(1) HCAP (healthcare-associated pneumonia)


(2) Sepsis


(3) Down's syndrome


(4) Dysphagia S/P PEG


(5) Seizure disorder


(6) Hypothyroidism


(7) Acute Hypoxemic respiratory failure


(8) Persistent, high grade bacteremia-  r/o endocarditis


(9) DAVID





Plan: 


Tolerated tube feeding @ 40 cc/hr


Follow-up with laboratory and cultures


on Levophed drip @ 4 MCG


Cont Daptomycin #10(abx d #13) for GPC bacteremia in view of DAVID 


Cont Meropenem#9 (abx d #13) given resp decompensation











Tyson Hnug MD Sep 3, 2020 23:03

## 2020-09-03 NOTE — CARDIOLOGY PROGRESS NOTE
Assessment/Plan


Assessment/Plan


sepsis


respiratory failure 


ards 


renal insuf 


bacteremia


abn cardiac enzyme due to demand 


sinsu arnold 


thrombocytopenia 








covid isolation removed as covid -x3


min trop abn 


ekg lat one form 8/22 reviewed non ischemic 


echo preformed 8/25 nl lv function 


pressor beign titrated off 


vent support 


abx 


tele sinus now 


i was called by rn regarign sinu arnold 


ekg persoanlly reviewed 


tele personally reviewed no pauses 


wbc improved 


renal insuf min better 





titrate off pressor if needed adn if arnold can use DA insted but at this time 

not needed





Subjective


ROS Limited/Unobtainable:  Yes


Subjective


on  fabiola not communicative





Objective





Last 24 Hour Vital Signs








  Date Time  Temp Pulse Resp B/P (MAP) Pulse Ox O2 Delivery O2 Flow Rate FiO2


 


9/3/20 20:00        100


 


9/3/20 20:00    133/65    


 


9/3/20 20:00      Mechanical Ventilator  





      Mechanical Ventilator  


 


9/3/20 20:00 98.2 54 30 133/55 (81) 100   


 


9/3/20 19:33  60      


 


9/3/20 19:30  58 27 113/76 (88) 100   


 


9/3/20 19:14  60 30     80


 


9/3/20 19:00  59 25 117/65 (82) 100   


 


9/3/20 19:00    117/65    


 


9/3/20 16:00  66      


 


9/3/20 12:00  59      


 


9/3/20 11:00  63 30 106/60 (75) 98   


 


9/3/20 10:30  63 30 110/54 (72) 99   


 


9/3/20 10:00  65 25 94/74 (81)    


 


9/3/20 09:30  66 27 119/71 (87) 91   


 


9/3/20 09:00  64 29 124/64 (84) 91   


 


9/3/20 08:30  67 25 129/56 (80) 95   


 


9/3/20 08:00      Mechanical Ventilator  





      Mechanical Ventilator  


 


9/3/20 08:00 98.6 64 29 112/65 (81) 96   


 


9/3/20 08:00  71      


 


9/3/20 08:00        80


 


9/3/20 07:45  70 28 139/93 (108) 97   


 


9/3/20 07:30  69 27 134/115 (121) 97   


 


9/3/20 07:05  60 30  97 Mechanical Ventilator  80





  65 30     


 


9/3/20 07:00  60 30 120/70 (87) 95   


 


9/3/20 07:00    120/70    


 


9/3/20 06:45  60 30 106/72 (83) 95   


 


9/3/20 06:30  63 27 112/64 (80) 96   


 


9/3/20 06:15  68 25 106/65 (79) 96   


 


9/3/20 06:00  65 29 114/58 (76) 95   


 


9/3/20 06:00    114/58    


 


9/3/20 05:45  61 30 120/54 (76) 98   


 


9/3/20 05:30  64 27 111/62 (78) 97   


 


9/3/20 05:15  64 28 120/78 (92) 98   


 


9/3/20 05:00  62 29 108/91 (97) 97   


 


9/3/20 05:00  69 30     80


 


9/3/20 05:00    108/91    


 


9/3/20 04:45  64 28 120/78 (92) 98   


 


9/3/20 04:30  63 29 132/65 (87) 97   


 


9/3/20 04:15  64 25 119/75 (90) 97   


 


9/3/20 04:00  67 25 113/77 (89) 97   


 


9/3/20 04:00        80


 


9/3/20 04:00  67      


 


9/3/20 04:00    113/77    


 


9/3/20 04:00      Mechanical Ventilator  





      Mechanical Ventilator  


 


9/3/20 03:45  64 27 135/95 (108) 97   


 


9/3/20 03:30  66 26 122/63 (82) 98   


 


9/3/20 03:15  69 32     80


 


9/3/20 03:15  72 25 135/81 (99) 97   


 


9/3/20 03:00  61 29 130/67 (88) 99   


 


9/3/20 03:00    130/67    


 


9/3/20 02:45  62 29 112/70 (84) 99   


 


9/3/20 02:30  68 27 116/85 (95) 99   


 


9/3/20 02:15  68 27 133/57 (82) 100   


 


9/3/20 02:00  64 27 115/57 (76) 100   


 


9/3/20 02:00    115/57    


 


9/3/20 01:45  73 31 109/43 (65) 99   


 


9/3/20 01:40  74 33     80


 


9/3/20 01:30  85 25 122/70 (87) 86   


 


9/3/20 01:15  67 27 115/68 (84) 98   


 


9/3/20 01:00    109/92    


 


9/3/20 01:00  69 23 109/92 (98) 98   


 


9/3/20 00:45  71 26 103/69 (80) 96   


 


9/3/20 00:30  66 28 121/67 (85) 99   


 


9/3/20 00:15  72 26 117/79 (92) 98   


 


9/3/20 00:00  74 24 150/84 (106) 96   


 


9/3/20 00:00        80


 


9/3/20 00:00    150/84    


 


9/3/20 00:00      Mechanical Ventilator  





      Mechanical Ventilator  


 


9/2/20 23:30  73 32     80


 


9/2/20 23:30  61 30 117/57 (77) 98   


 


9/2/20 23:15  78 22 125/67 (86) 97   


 


9/2/20 23:00  65 28 109/58 (75) 96   


 


9/2/20 23:00    109/58    


 


9/2/20 22:45  71 24 102/67 (79) 97   


 


9/2/20 22:30  75 24 110/88 (95) 97   


 


9/2/20 22:15  64 30 118/74 (89) 100   


 


9/2/20 22:00  72 30 98/68 (78) 100   


 


9/2/20 22:00    98/68    


 


9/2/20 21:45  74 27 97/57 (70) 100   


 


9/2/20 21:43  76 30     80


 


9/2/20 21:30  68 30 108/57 (74) 100   


 


9/2/20 21:30    108/57    


 


9/2/20 21:15  72 29 101/58 (72) 100   


 


9/2/20 21:15    101/58    


 


9/2/20 21:00  74 28 97/55 (69) 100   


 


9/2/20 21:00    97/55    


 


9/2/20 20:45  72 28 110/81 (91) 100   


 


9/2/20 20:45    110/81    


 


9/2/20 20:30  72 28 81/54 (63) 100   


 


9/2/20 20:30    81/54    








General Appearance:  no apparent distress, alert, on vent, patient on isolation

, isolation precautions


Cardiovascular:  normal rate


Respiratory/Chest:  rhonchi - bilaterally


Abdomen:  normal bowel sounds, non tender, soft


Extremities:  no swelling











Intake and Output  


 


 9/2/20 9/3/20





 19:00 07:00


 


Intake Total 1284.9555 ml 654.375 ml


 


Output Total 701 ml 410 ml


 


Balance 583.9555 ml 244.375 ml


 


  


 


Free Water  30 ml


 


IV Total 974.9555 ml 584.375 ml


 


Tube Feeding 310 ml 40 ml


 


Output Urine Total 700 ml 410 ml


 


Stool Total 1 ml 


 


# Bowel Movements 2 











Laboratory Tests








Test


  9/3/20


04:10 9/3/20


07:10


 


White Blood Count


  9.7 K/UL


(4.8-10.8) 


 


 


Red Blood Count


  2.63 M/UL


(4.20-5.40)  L 


 


 


Hemoglobin


  8.8 G/DL


(12.0-16.0)  L 


 


 


Hematocrit


  26.9 %


(37.0-47.0)  L 


 


 


Mean Corpuscular Volume


  102 FL (80-99)


H 


 


 


Mean Corpuscular Hemoglobin


  33.7 PG


(27.0-31.0)  H 


 


 


Mean Corpuscular Hemoglobin


Concent 32.9 G/DL


(32.0-36.0) 


 


 


Red Cell Distribution Width


  13.5 %


(11.6-14.8) 


 


 


Platelet Count


  68 K/UL


(150-450)  L 


 


 


Mean Platelet Volume


  8.8 FL


(6.5-10.1) 


 


 


Neutrophils (%) (Auto)


  % (45.0-75.0)


  


 


 


Lymphocytes (%) (Auto)


  % (20.0-45.0)


  


 


 


Monocytes (%) (Auto)  % (1.0-10.0)   


 


Eosinophils (%) (Auto)  % (0.0-3.0)   


 


Basophils (%) (Auto)  % (0.0-2.0)   


 


Differential Total Cells


Counted 100  


  


 


 


Neutrophils % (Manual) 84 % (45-75)  H 


 


Lymphocytes % (Manual) 11 % (20-45)  L 


 


Monocytes % (Manual) 5 % (1-10)   


 


Eosinophils % (Manual) 0 % (0-3)   


 


Basophils % (Manual) 0 % (0-2)   


 


Band Neutrophils 0 % (0-8)   


 


Platelet Estimate Decreased  L 


 


Platelet Morphology Normal   


 


Hypochromasia 2+   


 


Anisocytosis 1+   


 


Macrocytosis 1+   


 


Sodium Level


  143 MMOL/L


(136-145) 


 


 


Potassium Level


  3.4 MMOL/L


(3.5-5.1)  L 


 


 


Chloride Level


  112 MMOL/L


()  H 


 


 


Carbon Dioxide Level


  20 MMOL/L


(21-32)  L 


 


 


Anion Gap


  11 mmol/L


(5-15) 


 


 


Blood Urea Nitrogen


  37 mg/dL


(7-18)  H 


 


 


Creatinine


  1.8 MG/DL


(0.55-1.30)  H 


 


 


Estimat Glomerular Filtration


Rate 28.9 mL/min


(>60) 


 


 


Glucose Level


  96 MG/DL


() 


 


 


Calcium Level


  8.8 MG/DL


(8.5-10.1) 


 


 


Phosphorus Level


  3.9 MG/DL


(2.5-4.9) 


 


 


Magnesium Level


  2.1 MG/DL


(1.8-2.4) 


 


 


Total Bilirubin


  0.5 MG/DL


(0.2-1.0) 


 


 


Aspartate Amino Transf


(AST/SGOT) 23 U/L (15-37)


  


 


 


Alanine Aminotransferase


(ALT/SGPT) 12 U/L (12-78)


  


 


 


Alkaline Phosphatase


  50 U/L


() 


 


 


Total Protein


  4.8 G/DL


(6.4-8.2)  L 


 


 


Albumin


  1.6 G/DL


(3.4-5.0)  L 


 


 


Globulin 3.2 g/dL   


 


Albumin/Globulin Ratio


  0.5 (1.0-2.7)


L 


 


 


Arterial Blood pH


  


  7.438


(7.350-7.450)


 


Arterial Blood Partial


Pressure CO2 


  23.3 mmHg


(35.0-45.0)  *L


 


Arterial Blood Partial


Pressure O2 


  77.1 mmHg


(75.0-100.0)


 


Arterial Blood HCO3


  


  15.4 mmol/L


(22.0-26.0)  *L


 


Arterial Blood Oxygen


Saturation 


  94.5 %


()  L


 


Arterial Blood Base Excess  -7.5 (-2-2)  L


 


Cole Test  Positive  











Microbiology








 Date/Time


Source Procedure


Growth Status


 


 


 9/1/20 16:14


Sputum Gram Stain - Final Resulted


 


 9/1/20 16:14


Sputum Sputum Culture - Preliminary


NO GROWTH AFTER 24 HOURS Resulted

















Constantine Jorgensen MD Sep 3, 2020 20:30

## 2020-09-03 NOTE — DIAGNOSTIC IMAGING REPORT
Indication: Dyspnea

 

Technique: One view of the chest

 

Comparison: 9/2/2020

 

Findings: Stable satisfactory position of endotracheal tube and PICC. Bilateral

extensive diffuse infiltrates versus edema, bilateral left greater than right pleural

effusions are unchanged.

 

Impression:  Unchanged, over one day, findings as above.

## 2020-09-03 NOTE — NUR
NURSE NOTES:

Dr Jorgensen present at bedside to assess patient. Discussed current pt condition and plan 
of care. Pt BP noted to be stable attempting to titrate Levophed to 2mcg/min from 4mcg/min 

Will continue to evaluate and monitor pt

## 2020-09-03 NOTE — INFECTIOUS DISEASES PROG NOTE
Assessment/Plan








ASSESSMENT:


sp code blue 8/26





Septic Shock; pressors requirements improving


Fever, SP


Leukocytosis; SP


  -8/26 u/a no pyuria





Pneumonia.- COVID 19 neg x3


   Acute hypoxic resp failure on VM> NRB 15l 100%; hypoxic on ABG> now VDRF 8/26

- Fio2 80% >100% 8/28> 60% 9/1 >80% 9/2


         -9/1 sp cx NTD


         8/31 CXR: Extensive bilateral interstitial and airspace disease 

appears similar to the prior exam. Moderate to large bilateral pleural 

effusions appear unchanged.


   8/28 CXR: Increasing left upper lobe dense consolidation and likely 

increasing bilateral pleural fluid. Persistent diffuse dense consolidation 

elsewhere


            -8/26 sp cx normal resp lilliam


             -8/25 CXR: Increased atelectasis of the right lung, since prior 

exam of 3 days earlier. New or increased right pleural effusion. Increased left 

basilar consolidation and/or pleural fluid 


          -COVID Rapid PCR neg 8/23, 8/23, 8/26


          -8/22 spc x Group G strep


          -8/22 CXR:   Reduced lung volumes.  Patchy bilateral predominantly 

interstitial  pulmonary opacities.  Could be from edema and/or pneumonia.  

There is a broader differential.


 


        -legionella ag urine, blasto ab, Histo ab, HIV ab screen neg





Persistent, high grade bacteremia-  r/o endocarditis


     -8/22 Bcx 4/4 sets S. haemolyticus; 8/23 Bcx 3/4 S/ epi; 8/27 Bcx 1.4 S. 

warnerri; 8/29 Bcx NTD


     -2d echo: no vegetaions seen





          ua/ wbc 10-15, nit neg, leuk +1; ucx Neg





DAVID; 


 -supratherapeutic vanco levels





-Seizure disorder.


- Hypothyroidism.


- Down syndrome.





History of PEG tube placement.


[facility]





PLAN:


Cont Daptomycin #10(abx d #13) for GPC bacteremia in view of DAVID [monitor CK, 

was 51 on 8/26]


Cont Meropenem#9 (abx d #13) given resp decompensation


   -9/2 SP MIcafungin #7, Linezolid #5





f/u Repeat BCx given persistent bacteremia


CT chest when stable enough, not currently given on FiO2 100% and pressors


If pleural effusions, should be tapped to r/o empyema, send for bacterial cx as 

well as cell count and diff


F/u cx of exudate around G-tube


Trend GIB (black stools)





   8/28 SP Azithromycin #7/7


   8/26 SP Ceftriaxone #2


   8/25 SP IV Vancomycin #4, Zosyn #4


   8/22 SP Cefepime x1, Flagyl x1





- Monitor CBC, BMP.


-f/u Cocci ab, CrAg


.f/u  Repeat cx


- COVID neg x3


- Monitor chest x-ray.


- Monitor the patient's clinical course and labs.  Based on those, we will do 

further recommendation.





Thank you, Dr. Cherry, for allowing me to participate in the care of


this patient.  I will follow the patient with you at this


hospitalization.








Discussed with RN





Subjective


Allergies:  


Coded Allergies:  


     No Known Allergies (Unverified , 1/8/19)


afebrile 


remains intubated, Fio2 up to 80%


levo at 4 and felix at 20





Objective





Last 24 Hour Vital Signs








  Date Time  Temp Pulse Resp B/P (MAP) Pulse Ox O2 Delivery O2 Flow Rate FiO2


 


9/3/20 11:00  63 30 106/60 (75) 98   


 


9/3/20 10:30  63 30 110/54 (72) 99   


 


9/3/20 10:00  65 25 94/74 (81)    


 


9/3/20 09:30  66 27 119/71 (87) 91   


 


9/3/20 09:00  64 29 124/64 (84) 91   


 


9/3/20 08:30  67 25 129/56 (80) 95   


 


9/3/20 08:00      Mechanical Ventilator  





      Mechanical Ventilator  


 


9/3/20 08:00 98.6 64 29 112/65 (81) 96   


 


9/3/20 08:00  71      


 


9/3/20 08:00        80


 


9/3/20 07:45  70 28 139/93 (108) 97   


 


9/3/20 07:30  69 27 134/115 (121) 97   


 


9/3/20 07:05  60 30  97 Mechanical Ventilator  80





  65 30     


 


9/3/20 07:00  60 30 120/70 (87) 95   


 


9/3/20 07:00    120/70    


 


9/3/20 06:45  60 30 106/72 (83) 95   


 


9/3/20 06:30  63 27 112/64 (80) 96   


 


9/3/20 06:15  68 25 106/65 (79) 96   


 


9/3/20 06:00  65 29 114/58 (76) 95   


 


9/3/20 06:00    114/58    


 


9/3/20 05:45  61 30 120/54 (76) 98   


 


9/3/20 05:30  64 27 111/62 (78) 97   


 


9/3/20 05:15  64 28 120/78 (92) 98   


 


9/3/20 05:00  62 29 108/91 (97) 97   


 


9/3/20 05:00  69 30     80


 


9/3/20 05:00    108/91    


 


9/3/20 04:45  64 28 120/78 (92) 98   


 


9/3/20 04:30  63 29 132/65 (87) 97   


 


9/3/20 04:15  64 25 119/75 (90) 97   


 


9/3/20 04:00  67 25 113/77 (89) 97   


 


9/3/20 04:00        80


 


9/3/20 04:00  67      


 


9/3/20 04:00    113/77    


 


9/3/20 04:00      Mechanical Ventilator  





      Mechanical Ventilator  


 


9/3/20 03:45  64 27 135/95 (108) 97   


 


9/3/20 03:30  66 26 122/63 (82) 98   


 


9/3/20 03:15  69 32     80


 


9/3/20 03:15  72 25 135/81 (99) 97   


 


9/3/20 03:00  61 29 130/67 (88) 99   


 


9/3/20 03:00    130/67    


 


9/3/20 02:45  62 29 112/70 (84) 99   


 


9/3/20 02:30  68 27 116/85 (95) 99   


 


9/3/20 02:15  68 27 133/57 (82) 100   


 


9/3/20 02:00  64 27 115/57 (76) 100   


 


9/3/20 02:00    115/57    


 


9/3/20 01:45  73 31 109/43 (65) 99   


 


9/3/20 01:40  74 33     80


 


9/3/20 01:30  85 25 122/70 (87) 86   


 


9/3/20 01:15  67 27 115/68 (84) 98   


 


9/3/20 01:00    109/92    


 


9/3/20 01:00  69 23 109/92 (98) 98   


 


9/3/20 00:45  71 26 103/69 (80) 96   


 


9/3/20 00:30  66 28 121/67 (85) 99   


 


9/3/20 00:15  72 26 117/79 (92) 98   


 


9/3/20 00:00  74 24 150/84 (106) 96   


 


9/3/20 00:00        80


 


9/3/20 00:00    150/84    


 


9/3/20 00:00      Mechanical Ventilator  





      Mechanical Ventilator  


 


9/2/20 23:30  73 32     80


 


9/2/20 23:30  61 30 117/57 (77) 98   


 


9/2/20 23:15  78 22 125/67 (86) 97   


 


9/2/20 23:00  65 28 109/58 (75) 96   


 


9/2/20 23:00    109/58    


 


9/2/20 22:45  71 24 102/67 (79) 97   


 


9/2/20 22:30  75 24 110/88 (95) 97   


 


9/2/20 22:15  64 30 118/74 (89) 100   


 


9/2/20 22:00  72 30 98/68 (78) 100   


 


9/2/20 22:00    98/68    


 


9/2/20 21:45  74 27 97/57 (70) 100   


 


9/2/20 21:43  76 30     80


 


9/2/20 21:30  68 30 108/57 (74) 100   


 


9/2/20 21:30    108/57    


 


9/2/20 21:15  72 29 101/58 (72) 100   


 


9/2/20 21:15    101/58    


 


9/2/20 21:00  74 28 97/55 (69) 100   


 


9/2/20 21:00    97/55    


 


9/2/20 20:45  72 28 110/81 (91) 100   


 


9/2/20 20:45    110/81    


 


9/2/20 20:30  72 28 81/54 (63) 100   


 


9/2/20 20:30    81/54    


 


9/2/20 20:15  77 27 114/53 (73) 98   


 


9/2/20 20:00 97.5 67 30 112/91 (98) 100   


 


9/2/20 20:00    112/91    


 


9/2/20 20:00  67      


 


9/2/20 20:00      Mechanical Ventilator  





      Mechanical Ventilator  


 


9/2/20 19:45  79 20 115/96 (102) 95   


 


9/2/20 19:45  76 32  100 Mechanical Ventilator  80





  79 30     80


 


9/2/20 19:30  59 30 89/67 (74) 100   


 


9/2/20 19:15  64 29 117/60 (79) 100   


 


9/2/20 19:00    95/46    


 


9/2/20 19:00  66 29 95/46 (62) 100   


 


9/2/20 18:45  69 29 108/54 (72) 99   


 


9/2/20 18:30    123/96    


 


9/2/20 18:30  75 29 123/96 (105) 98   


 


9/2/20 18:15  64 30 116/72 (87) 98   


 


9/2/20 18:00    102/61    


 


9/2/20 18:00  64 30 102/61 (75) 100   


 


9/2/20 17:45  71 30 104/57 (73) 100   


 


9/2/20 17:30        80


 


9/2/20 17:30  64 30 112/45 (67) 100   


 


9/2/20 17:29  62 30     80


 


9/2/20 17:15    127/91    


 


9/2/20 17:15  80 26 127/91 (103) 100   


 


9/2/20 17:00    125/61    


 


9/2/20 17:00  64 30 125/61 (82) 100   


 


9/2/20 16:30  70 29 136/71 (92) 100   


 


9/2/20 16:05        100


 


9/2/20 16:00  66      


 


9/2/20 16:00      Mechanical Ventilator  





      Mechanical Ventilator  


 


9/2/20 16:00 97.8 65 30 118/53 (74) 97   


 


9/2/20 16:00    118/53    


 


9/2/20 15:30  66 30 114/52 (72) 97   


 


9/2/20 15:00    112/51    


 


9/2/20 15:00  69 30 112/50 (70) 97   


 


9/2/20 15:00        60


 


9/2/20 14:40  74 30  97 Mechanical Ventilator  60





  71 30     60


 


9/2/20 14:30  77 23 118/79 (92) 98   


 


9/2/20 14:01  83 20 99/48 (65) 97   


 


9/2/20 14:00    99/48    


 


9/2/20 13:30  73 24 100/53 (69) 95   


 


9/2/20 13:16  76 33     70


 


9/2/20 13:00  63 30 109/54 (72) 96   


 


9/2/20 13:00    109/54    


 


9/2/20 12:30  66 30 110/52 (71) 97   


 


9/2/20 12:00 98.1 80 28 100/45 (63) 98   


 


9/2/20 12:00  68      


 


9/2/20 12:00    100/45    


 


9/2/20 11:53      Mechanical Ventilator  





      Mechanical Ventilator  








Height (Feet):  5


Height (Inches):  3.00


Weight (Pounds):  172


HEENT:  No pale conjunctivae.  No icterus. ETT in place


NECK:  No lymphadenopathy.


CHEST:  Coarse breathing sounds.


HEART:  S1 and S2.


ABDOMEN:  Soft.  PEG tube in place.


EXTREMITIES:  No cyanosis at this time .


SKIN: no rash





Microbiology








 Date/Time


Source Procedure


Growth Status


 


 


 9/1/20 16:14


Sputum Gram Stain - Final Resulted


 


 9/1/20 16:14


Sputum Sputum Culture - Preliminary


NO GROWTH AFTER 24 HOURS Resulted











Laboratory Tests








Test


  9/3/20


04:10 9/3/20


07:10


 


White Blood Count


  9.7 K/UL


(4.8-10.8) 


 


 


Red Blood Count


  2.63 M/UL


(4.20-5.40)  L 


 


 


Hemoglobin


  8.8 G/DL


(12.0-16.0)  L 


 


 


Hematocrit


  26.9 %


(37.0-47.0)  L 


 


 


Mean Corpuscular Volume


  102 FL (80-99)


H 


 


 


Mean Corpuscular Hemoglobin


  33.7 PG


(27.0-31.0)  H 


 


 


Mean Corpuscular Hemoglobin


Concent 32.9 G/DL


(32.0-36.0) 


 


 


Red Cell Distribution Width


  13.5 %


(11.6-14.8) 


 


 


Platelet Count


  68 K/UL


(150-450)  L 


 


 


Mean Platelet Volume


  8.8 FL


(6.5-10.1) 


 


 


Neutrophils (%) (Auto)


  % (45.0-75.0)


  


 


 


Lymphocytes (%) (Auto)


  % (20.0-45.0)


  


 


 


Monocytes (%) (Auto)  % (1.0-10.0)   


 


Eosinophils (%) (Auto)  % (0.0-3.0)   


 


Basophils (%) (Auto)  % (0.0-2.0)   


 


Differential Total Cells


Counted 100  


  


 


 


Neutrophils % (Manual) 84 % (45-75)  H 


 


Lymphocytes % (Manual) 11 % (20-45)  L 


 


Monocytes % (Manual) 5 % (1-10)   


 


Eosinophils % (Manual) 0 % (0-3)   


 


Basophils % (Manual) 0 % (0-2)   


 


Band Neutrophils 0 % (0-8)   


 


Platelet Estimate Decreased  L 


 


Platelet Morphology Normal   


 


Hypochromasia 2+   


 


Anisocytosis 1+   


 


Macrocytosis 1+   


 


Sodium Level


  143 MMOL/L


(136-145) 


 


 


Potassium Level


  3.4 MMOL/L


(3.5-5.1)  L 


 


 


Chloride Level


  112 MMOL/L


()  H 


 


 


Carbon Dioxide Level


  20 MMOL/L


(21-32)  L 


 


 


Anion Gap


  11 mmol/L


(5-15) 


 


 


Blood Urea Nitrogen


  37 mg/dL


(7-18)  H 


 


 


Creatinine


  1.8 MG/DL


(0.55-1.30)  H 


 


 


Estimat Glomerular Filtration


Rate 28.9 mL/min


(>60) 


 


 


Glucose Level


  96 MG/DL


() 


 


 


Calcium Level


  8.8 MG/DL


(8.5-10.1) 


 


 


Phosphorus Level


  3.9 MG/DL


(2.5-4.9) 


 


 


Magnesium Level


  2.1 MG/DL


(1.8-2.4) 


 


 


Total Bilirubin


  0.5 MG/DL


(0.2-1.0) 


 


 


Aspartate Amino Transf


(AST/SGOT) 23 U/L (15-37)


  


 


 


Alanine Aminotransferase


(ALT/SGPT) 12 U/L (12-78)


  


 


 


Alkaline Phosphatase


  50 U/L


() 


 


 


Total Protein


  4.8 G/DL


(6.4-8.2)  L 


 


 


Albumin


  1.6 G/DL


(3.4-5.0)  L 


 


 


Globulin 3.2 g/dL   


 


Albumin/Globulin Ratio


  0.5 (1.0-2.7)


L 


 


 


Arterial Blood pH


  


  7.438


(7.350-7.450)


 


Arterial Blood Partial


Pressure CO2 


  23.3 mmHg


(35.0-45.0)  *L


 


Arterial Blood Partial


Pressure O2 


  77.1 mmHg


(75.0-100.0)


 


Arterial Blood HCO3


  


  15.4 mmol/L


(22.0-26.0)  *L


 


Arterial Blood Oxygen


Saturation 


  94.5 %


()  L


 


Arterial Blood Base Excess  -7.5 (-2-2)  L


 


Cole Test  Positive  











Current Medications








 Medications


  (Trade)  Dose


 Ordered  Sig/Didi


 Route


 PRN Reason  Start Time


 Stop Time Status Last Admin


Dose Admin


 


 Acetaminophen


  (Tylenol)  650 mg  Q4H  PRN


 GT


 FEVER  8/26/20 11:45


 9/25/20 11:44  8/29/20 23:15


 


 


 Albuterol/


 Ipratropium


  (Albuterol/


 Ipratropium)  3 ml  TIDRT


 HHN


   8/29/20 13:00


 9/3/20 12:59  9/3/20 07:14


 


 


 Ascorbic Acid


  (Vitamin C)  250 mg  DAILY


 GT


   9/3/20 09:00


 10/3/20 08:59  9/3/20 09:53


 


 


 Chlorhexidine


 Gluconate


  (Beatrice-Hex 2%)  1 applic  DAILY@2000


 TOPIC


   8/31/20 20:00


 11/29/20 19:59  9/2/20 19:42


 


 


 Clotrimazole


  (Lotrimin)  1 applic  Q12HR


 TOPIC


   8/30/20 13:00


 11/28/20 12:59  9/3/20 09:53


 


 


 Daptomycin 350 mg/


 Sodium Chloride  55 ml @ 


 100 mls/hr  Q48H


 IV


   8/28/20 11:00


 9/5/20 10:59  9/3/20 11:25


 


 


 Dextrose


  (Dextrose 50%)  25 ml  Q30M  PRN


 IV


 Hypoglycemia  8/26/20 11:30


 11/20/20 11:29   


 


 


 Dextrose


  (Dextrose 50%)  50 ml  Q30M  PRN


 IV


 Hypoglycemia  8/26/20 11:30


 11/20/20 11:29   


 


 


 Hydrocortisone


  (Solu-CORTEF)  100 mg  EVERY 8  HOURS


 IV


   8/30/20 14:00


 11/28/20 13:59  9/3/20 05:50


 


 


 Levothyroxine


 Sodium


  (Synthroid)  75 mcg  DAILY


 GT


   8/27/20 09:00


 9/22/20 08:59  9/3/20 09:53


 


 


 Meropenem 1 gm/


 Sodium Chloride  55 ml @ 


 110 mls/hr  Q12H


 IVPB


   8/26/20 16:00


 9/5/20 15:59  9/3/20 03:09


 


 


 Micafungin Sodium


 100 mg/Sodium


 Chloride  110 ml @ 


 110 mls/hr  Q24H


 IVPB


   8/27/20 14:00


 9/3/20 13:59  9/2/20 13:39


 


 


 Midodrine


  (Pro-Amatine)  10 mg  Q8HR


 GT


   8/28/20 14:00


 11/26/20 13:59  9/3/20 05:50


 


 


 Norepinephrine


 Bitartrate 16 mg/


 Dextrose  500 ml @ 0


 mls/hr  Q24H


 IV


   8/31/20 07:45


 9/29/20 12:59  9/2/20 08:20


 


 


 Ondansetron HCl


  (Zofran)  4 mg  Q6H  PRN


 IVP


 Nausea & Vomiting  8/26/20 12:00


 9/21/20 11:59   


 


 


 Pantoprazole


  (Protonix)  40 mg  DAILY


 IV


   8/27/20 09:00


 9/22/20 08:59  9/3/20 09:53


 


 


 Phenylephrine HCl


 50 mg/Dextrose  250 ml @ 0


 mls/hr  Q24H  PRN


 IV


 For hypotension  8/29/20 17:45


 9/28/20 17:44  8/31/20 01:49


 


 


 Polyethylene


 Glycol


  (Miralax)  17 gm  DAILYPRN  PRN


 GT


 Constipation  8/26/20 12:00


 9/21/20 11:59   


 


 


 Sodium Chloride  1,000 ml @ 


 50 mls/hr  Q20H


 IV


   8/31/20 09:32


 9/30/20 09:31  9/3/20 06:46


 


 


 Valproic Acid


  (Depakene)  500 mg  EVERY 8  HOURS


 GT


   8/26/20 14:00


 9/21/20 21:59  9/3/20 05:49


 

















Rema Gomes M.D. Sep 3, 2020 11:57

## 2020-09-04 VITALS — DIASTOLIC BLOOD PRESSURE: 44 MMHG | SYSTOLIC BLOOD PRESSURE: 109 MMHG

## 2020-09-04 VITALS — SYSTOLIC BLOOD PRESSURE: 108 MMHG | DIASTOLIC BLOOD PRESSURE: 52 MMHG

## 2020-09-04 VITALS — DIASTOLIC BLOOD PRESSURE: 50 MMHG | SYSTOLIC BLOOD PRESSURE: 98 MMHG

## 2020-09-04 VITALS — SYSTOLIC BLOOD PRESSURE: 105 MMHG | DIASTOLIC BLOOD PRESSURE: 55 MMHG

## 2020-09-04 VITALS — SYSTOLIC BLOOD PRESSURE: 122 MMHG | DIASTOLIC BLOOD PRESSURE: 50 MMHG

## 2020-09-04 VITALS — SYSTOLIC BLOOD PRESSURE: 102 MMHG | DIASTOLIC BLOOD PRESSURE: 69 MMHG

## 2020-09-04 VITALS — SYSTOLIC BLOOD PRESSURE: 109 MMHG | DIASTOLIC BLOOD PRESSURE: 53 MMHG

## 2020-09-04 VITALS — SYSTOLIC BLOOD PRESSURE: 113 MMHG | DIASTOLIC BLOOD PRESSURE: 53 MMHG

## 2020-09-04 VITALS — DIASTOLIC BLOOD PRESSURE: 74 MMHG | SYSTOLIC BLOOD PRESSURE: 111 MMHG

## 2020-09-04 VITALS — SYSTOLIC BLOOD PRESSURE: 101 MMHG | DIASTOLIC BLOOD PRESSURE: 37 MMHG

## 2020-09-04 VITALS — SYSTOLIC BLOOD PRESSURE: 136 MMHG | DIASTOLIC BLOOD PRESSURE: 63 MMHG

## 2020-09-04 VITALS — SYSTOLIC BLOOD PRESSURE: 113 MMHG | DIASTOLIC BLOOD PRESSURE: 52 MMHG

## 2020-09-04 VITALS — DIASTOLIC BLOOD PRESSURE: 50 MMHG | SYSTOLIC BLOOD PRESSURE: 96 MMHG

## 2020-09-04 VITALS — DIASTOLIC BLOOD PRESSURE: 54 MMHG | SYSTOLIC BLOOD PRESSURE: 145 MMHG

## 2020-09-04 VITALS — SYSTOLIC BLOOD PRESSURE: 108 MMHG | DIASTOLIC BLOOD PRESSURE: 51 MMHG

## 2020-09-04 VITALS — DIASTOLIC BLOOD PRESSURE: 58 MMHG | SYSTOLIC BLOOD PRESSURE: 103 MMHG

## 2020-09-04 VITALS — DIASTOLIC BLOOD PRESSURE: 53 MMHG | SYSTOLIC BLOOD PRESSURE: 109 MMHG

## 2020-09-04 VITALS — DIASTOLIC BLOOD PRESSURE: 57 MMHG | SYSTOLIC BLOOD PRESSURE: 128 MMHG

## 2020-09-04 VITALS — SYSTOLIC BLOOD PRESSURE: 117 MMHG | DIASTOLIC BLOOD PRESSURE: 59 MMHG

## 2020-09-04 VITALS — SYSTOLIC BLOOD PRESSURE: 126 MMHG | DIASTOLIC BLOOD PRESSURE: 57 MMHG

## 2020-09-04 VITALS — SYSTOLIC BLOOD PRESSURE: 123 MMHG | DIASTOLIC BLOOD PRESSURE: 52 MMHG

## 2020-09-04 VITALS — DIASTOLIC BLOOD PRESSURE: 51 MMHG | SYSTOLIC BLOOD PRESSURE: 108 MMHG

## 2020-09-04 VITALS — SYSTOLIC BLOOD PRESSURE: 122 MMHG | DIASTOLIC BLOOD PRESSURE: 57 MMHG

## 2020-09-04 VITALS — DIASTOLIC BLOOD PRESSURE: 80 MMHG | SYSTOLIC BLOOD PRESSURE: 134 MMHG

## 2020-09-04 VITALS — SYSTOLIC BLOOD PRESSURE: 88 MMHG | DIASTOLIC BLOOD PRESSURE: 36 MMHG

## 2020-09-04 VITALS — SYSTOLIC BLOOD PRESSURE: 108 MMHG | DIASTOLIC BLOOD PRESSURE: 54 MMHG

## 2020-09-04 VITALS — SYSTOLIC BLOOD PRESSURE: 98 MMHG | DIASTOLIC BLOOD PRESSURE: 55 MMHG

## 2020-09-04 VITALS — DIASTOLIC BLOOD PRESSURE: 55 MMHG | SYSTOLIC BLOOD PRESSURE: 115 MMHG

## 2020-09-04 VITALS — SYSTOLIC BLOOD PRESSURE: 102 MMHG | DIASTOLIC BLOOD PRESSURE: 56 MMHG

## 2020-09-04 VITALS — SYSTOLIC BLOOD PRESSURE: 140 MMHG | DIASTOLIC BLOOD PRESSURE: 65 MMHG

## 2020-09-04 VITALS — SYSTOLIC BLOOD PRESSURE: 123 MMHG | DIASTOLIC BLOOD PRESSURE: 48 MMHG

## 2020-09-04 VITALS — SYSTOLIC BLOOD PRESSURE: 111 MMHG | DIASTOLIC BLOOD PRESSURE: 51 MMHG

## 2020-09-04 VITALS — DIASTOLIC BLOOD PRESSURE: 48 MMHG | SYSTOLIC BLOOD PRESSURE: 121 MMHG

## 2020-09-04 VITALS — DIASTOLIC BLOOD PRESSURE: 73 MMHG | SYSTOLIC BLOOD PRESSURE: 125 MMHG

## 2020-09-04 VITALS — DIASTOLIC BLOOD PRESSURE: 47 MMHG | SYSTOLIC BLOOD PRESSURE: 72 MMHG

## 2020-09-04 VITALS — SYSTOLIC BLOOD PRESSURE: 151 MMHG | DIASTOLIC BLOOD PRESSURE: 74 MMHG

## 2020-09-04 VITALS — SYSTOLIC BLOOD PRESSURE: 94 MMHG | DIASTOLIC BLOOD PRESSURE: 56 MMHG

## 2020-09-04 VITALS — DIASTOLIC BLOOD PRESSURE: 49 MMHG | SYSTOLIC BLOOD PRESSURE: 112 MMHG

## 2020-09-04 VITALS — SYSTOLIC BLOOD PRESSURE: 112 MMHG | DIASTOLIC BLOOD PRESSURE: 59 MMHG

## 2020-09-04 VITALS — DIASTOLIC BLOOD PRESSURE: 46 MMHG | SYSTOLIC BLOOD PRESSURE: 100 MMHG

## 2020-09-04 VITALS — SYSTOLIC BLOOD PRESSURE: 122 MMHG | DIASTOLIC BLOOD PRESSURE: 62 MMHG

## 2020-09-04 VITALS — SYSTOLIC BLOOD PRESSURE: 111 MMHG | DIASTOLIC BLOOD PRESSURE: 49 MMHG

## 2020-09-04 VITALS — DIASTOLIC BLOOD PRESSURE: 56 MMHG | SYSTOLIC BLOOD PRESSURE: 154 MMHG

## 2020-09-04 VITALS — DIASTOLIC BLOOD PRESSURE: 57 MMHG | SYSTOLIC BLOOD PRESSURE: 120 MMHG

## 2020-09-04 VITALS — SYSTOLIC BLOOD PRESSURE: 102 MMHG | DIASTOLIC BLOOD PRESSURE: 53 MMHG

## 2020-09-04 VITALS — SYSTOLIC BLOOD PRESSURE: 112 MMHG | DIASTOLIC BLOOD PRESSURE: 55 MMHG

## 2020-09-04 VITALS — DIASTOLIC BLOOD PRESSURE: 56 MMHG | SYSTOLIC BLOOD PRESSURE: 112 MMHG

## 2020-09-04 VITALS — SYSTOLIC BLOOD PRESSURE: 119 MMHG | DIASTOLIC BLOOD PRESSURE: 50 MMHG

## 2020-09-04 VITALS — SYSTOLIC BLOOD PRESSURE: 116 MMHG | DIASTOLIC BLOOD PRESSURE: 47 MMHG

## 2020-09-04 VITALS — DIASTOLIC BLOOD PRESSURE: 62 MMHG | SYSTOLIC BLOOD PRESSURE: 109 MMHG

## 2020-09-04 VITALS — DIASTOLIC BLOOD PRESSURE: 55 MMHG | SYSTOLIC BLOOD PRESSURE: 108 MMHG

## 2020-09-04 VITALS — SYSTOLIC BLOOD PRESSURE: 115 MMHG | DIASTOLIC BLOOD PRESSURE: 60 MMHG

## 2020-09-04 VITALS — SYSTOLIC BLOOD PRESSURE: 123 MMHG | DIASTOLIC BLOOD PRESSURE: 54 MMHG

## 2020-09-04 VITALS — DIASTOLIC BLOOD PRESSURE: 81 MMHG | SYSTOLIC BLOOD PRESSURE: 108 MMHG

## 2020-09-04 VITALS — SYSTOLIC BLOOD PRESSURE: 135 MMHG | DIASTOLIC BLOOD PRESSURE: 64 MMHG

## 2020-09-04 VITALS — DIASTOLIC BLOOD PRESSURE: 46 MMHG | SYSTOLIC BLOOD PRESSURE: 89 MMHG

## 2020-09-04 VITALS — DIASTOLIC BLOOD PRESSURE: 58 MMHG | SYSTOLIC BLOOD PRESSURE: 135 MMHG

## 2020-09-04 VITALS — SYSTOLIC BLOOD PRESSURE: 109 MMHG | DIASTOLIC BLOOD PRESSURE: 56 MMHG

## 2020-09-04 VITALS — SYSTOLIC BLOOD PRESSURE: 105 MMHG | DIASTOLIC BLOOD PRESSURE: 57 MMHG

## 2020-09-04 VITALS — DIASTOLIC BLOOD PRESSURE: 44 MMHG | SYSTOLIC BLOOD PRESSURE: 94 MMHG

## 2020-09-04 VITALS — SYSTOLIC BLOOD PRESSURE: 92 MMHG | DIASTOLIC BLOOD PRESSURE: 53 MMHG

## 2020-09-04 VITALS — DIASTOLIC BLOOD PRESSURE: 46 MMHG | SYSTOLIC BLOOD PRESSURE: 108 MMHG

## 2020-09-04 VITALS — SYSTOLIC BLOOD PRESSURE: 107 MMHG | DIASTOLIC BLOOD PRESSURE: 68 MMHG

## 2020-09-04 VITALS — DIASTOLIC BLOOD PRESSURE: 57 MMHG | SYSTOLIC BLOOD PRESSURE: 126 MMHG

## 2020-09-04 VITALS — DIASTOLIC BLOOD PRESSURE: 52 MMHG | SYSTOLIC BLOOD PRESSURE: 127 MMHG

## 2020-09-04 VITALS — SYSTOLIC BLOOD PRESSURE: 119 MMHG | DIASTOLIC BLOOD PRESSURE: 53 MMHG

## 2020-09-04 LAB
ADD MANUAL DIFF: NO
ALBUMIN SERPL-MCNC: 1.4 G/DL (ref 3.4–5)
ALBUMIN/GLOB SERPL: 0.5 {RATIO} (ref 1–2.7)
ALP SERPL-CCNC: 48 U/L (ref 46–116)
ALT SERPL-CCNC: 14 U/L (ref 12–78)
ANION GAP SERPL CALC-SCNC: 13 MMOL/L (ref 5–15)
AST SERPL-CCNC: 28 U/L (ref 15–37)
BILIRUB SERPL-MCNC: 0.5 MG/DL (ref 0.2–1)
BUN SERPL-MCNC: 43 MG/DL (ref 7–18)
CALCIUM SERPL-MCNC: 8.4 MG/DL (ref 8.5–10.1)
CHLORIDE SERPL-SCNC: 116 MMOL/L (ref 98–107)
CO2 SERPL-SCNC: 18 MMOL/L (ref 21–32)
CREAT SERPL-MCNC: 1.6 MG/DL (ref 0.55–1.3)
ERYTHROCYTE [DISTWIDTH] IN BLOOD BY AUTOMATED COUNT: 12.7 % (ref 11.6–14.8)
GLOBULIN SER-MCNC: 3 G/DL
HCT VFR BLD CALC: 24.6 % (ref 37–47)
HGB BLD-MCNC: 8.3 G/DL (ref 12–16)
MCV RBC AUTO: 101 FL (ref 80–99)
PHOSPHATE SERPL-MCNC: 3.2 MG/DL (ref 2.5–4.9)
PLATELET # BLD: 53 K/UL (ref 150–450)
POTASSIUM SERPL-SCNC: 3.5 MMOL/L (ref 3.5–5.1)
RBC # BLD AUTO: 2.44 M/UL (ref 4.2–5.4)
SODIUM SERPL-SCNC: 147 MMOL/L (ref 136–145)
WBC # BLD AUTO: 9.1 K/UL (ref 4.8–10.8)

## 2020-09-04 RX ADMIN — HYDROCORTISONE SODIUM SUCCINATE SCH MG: 100 INJECTION, POWDER, FOR SOLUTION INTRAMUSCULAR; INTRAVENOUS at 13:30

## 2020-09-04 RX ADMIN — VALPROIC ACID SCH MG: 250 SOLUTION ORAL at 05:19

## 2020-09-04 RX ADMIN — HYDROCORTISONE SODIUM SUCCINATE SCH MG: 100 INJECTION, POWDER, FOR SOLUTION INTRAMUSCULAR; INTRAVENOUS at 05:19

## 2020-09-04 RX ADMIN — PANTOPRAZOLE SODIUM SCH MG: 40 INJECTION, POWDER, FOR SOLUTION INTRAVENOUS at 08:24

## 2020-09-04 RX ADMIN — MEROPENEM SCH MLS/HR: 1 INJECTION INTRAVENOUS at 15:28

## 2020-09-04 RX ADMIN — MIDODRINE HYDROCHLORIDE SCH MG: 10 TABLET ORAL at 13:22

## 2020-09-04 RX ADMIN — VALPROIC ACID SCH MG: 250 SOLUTION ORAL at 13:30

## 2020-09-04 RX ADMIN — MIDODRINE HYDROCHLORIDE SCH MG: 10 TABLET ORAL at 21:19

## 2020-09-04 RX ADMIN — MEROPENEM SCH MLS/HR: 1 INJECTION INTRAVENOUS at 04:12

## 2020-09-04 RX ADMIN — HYDROCORTISONE SODIUM SUCCINATE SCH MG: 100 INJECTION, POWDER, FOR SOLUTION INTRAMUSCULAR; INTRAVENOUS at 21:19

## 2020-09-04 RX ADMIN — SODIUM CHLORIDE SCH MLS/HR: 900 INJECTION, SOLUTION INTRAVENOUS at 08:43

## 2020-09-04 RX ADMIN — CHLORHEXIDINE GLUCONATE SCH APPLIC: 213 SOLUTION TOPICAL at 20:33

## 2020-09-04 RX ADMIN — DOPAMINE HYDROCHLORIDE IN DEXTROSE SCH MLS/HR: 1.6 INJECTION, SOLUTION INTRAVENOUS at 11:19

## 2020-09-04 RX ADMIN — MIDODRINE HYDROCHLORIDE SCH MG: 10 TABLET ORAL at 05:19

## 2020-09-04 RX ADMIN — VALPROIC ACID SCH MG: 250 SOLUTION ORAL at 21:19

## 2020-09-04 RX ADMIN — Medication SCH MG: at 08:24

## 2020-09-04 RX ADMIN — SODIUM CHLORIDE SCH MLS/HR: 900 INJECTION, SOLUTION INTRAVENOUS at 07:45

## 2020-09-04 NOTE — NUR
NURSE NOTES:

Patient noted with episode of fighting the vent and RR 44, Dr Cherry made aware with new 
order Ativan 2mg IV Q2hrs PRN. One dose given will continue close monitoring

## 2020-09-04 NOTE — CARDIOLOGY PROGRESS NOTE
Assessment/Plan


Assessment/Plan


sepsis


respiratory failure 


ards 


renal insuf 


bacteremia


abn cardiac enzyme due to demand 


sinsu arnold 


thrombocytopenia 








covid isolation removed as covid -x3


min trop abn 


ekg lat one form 8/22 reviewed non ischemic 


echo preformed 8/25 nl lv function 


pressor 


vent support 


abx 


tele sinus now 


sinus arnold imrpoved on DA 


ekg persoanlly reviewed 


tele personally reviewed no pauses 


wbc improved 


renal insuf min better 


3rd spacing but bp now allwo effective diureisis yet





Subjective


ROS Limited/Unobtainable:  Yes


Subjective


on  fabiola not communicative





Objective





Last 24 Hour Vital Signs








  Date Time  Temp Pulse Resp B/P (MAP) Pulse Ox O2 Delivery O2 Flow Rate FiO2


 


9/4/20 18:30  73 33 101/37 (58) 100   


 


9/4/20 18:07  70 28 85/36 99   


 


9/4/20 18:00  77 33 88/36 (53) 92   


 


9/4/20 18:00    85/36    


 


9/4/20 17:37  81 51     100


 


9/4/20 17:37  80 44 95/56    


 


9/4/20 17:30  72 40 94/56 (69) 99   


 


9/4/20 17:17  69 37     80


 


9/4/20 17:00  67 22 108/81 (90) 97   


 


9/4/20 17:00    108/81    


 


9/4/20 16:30  60 30 108/46 (66) 94   


 


9/4/20 16:00      Mechanical Ventilator  





      Mechanical Ventilator  


 


9/4/20 16:00        80


 


9/4/20 16:00  64      


 


9/4/20 16:00    108/46    


 


9/4/20 16:00 98.0 55 30 108/54 (72) 94   


 


9/4/20 15:45  53 30 134/80 (98) 97   


 


9/4/20 15:30  58 29 145/54 (84) 94   


 


9/4/20 15:24  52 30     80


 


9/4/20 15:15  53 30 109/62 (78) 95   


 


9/4/20 15:00    128/57    


 


9/4/20 15:00  55 29 128/57 (80) 96   


 


9/4/20 14:45  52 30 113/53 (73) 97   


 


9/4/20 14:30  55 30 119/53 (75) 95   


 


9/4/20 14:15  55 30 112/49 (70) 94   


 


9/4/20 14:00    113/53    


 


9/4/20 14:00  60 29 123/52 (75) 93   


 


9/4/20 13:45  59 29 127/52 (77) 94   


 


9/4/20 13:30  52 30 112/55 (74) 96   


 


9/4/20 13:15  58 29 119/50 (73) 95   


 


9/4/20 13:00  58 29 154/56 (88) 95   


 


9/4/20 13:00    154/58    


 


9/4/20 12:51  53 30     80


 


9/4/20 12:45  53 30 122/50 (74) 96   


 


9/4/20 12:30  53 30 123/54 (77) 95   


 


9/4/20 12:15  55 30 126/57 (80) 95   


 


9/4/20 12:00 98.6 57 30 123/48 (73) 96   


 


9/4/20 12:00      Mechanical Ventilator  





      Mechanical Ventilator  


 


9/4/20 12:00        100


 


9/4/20 12:00  61      


 


9/4/20 12:00    128/57    


 


9/4/20 11:45  78 28 151/74 (99) 95   


 


9/4/20 11:30  57 28 111/74 (86) 99   


 


9/4/20 11:26  66 30     80


 


9/4/20 11:19    100/46    


 


9/4/20 11:15  49 30 100/46 (64) 99   


 


9/4/20 11:00  50 30 113/52 (72) 100   


 


9/4/20 10:45  54 30 98/50 (66) 100   


 


9/4/20 10:30  58 29 109/53 (71) 99   


 


9/4/20 10:15  55 30 108/52 (70) 100   


 


9/4/20 10:00    98/50    


 


9/4/20 10:00  57 27 96/50 (65) 99   


 


9/4/20 09:45  61 29 107/68 (81) 99   


 


9/4/20 09:30  55 27 126/57 (80) 97   


 


9/4/20 09:15  53 30 111/51 (71) 100   


 


9/4/20 09:00  59 28 109/53 (71) 100   


 


9/4/20 09:00    107/68    


 


9/4/20 08:57  54 30     80


 


9/4/20 08:45  61 25 116/47 (70) 99   


 


9/4/20 08:43    135/58    


 


9/4/20 08:30  53 30 135/58 (83) 99   


 


9/4/20 08:15 98.4 53 30 140/65 (90) 99   


 


9/4/20 08:00  51      


 


9/4/20 08:00      Mechanical Ventilator  





      Mechanical Ventilator  


 


9/4/20 08:00  53 29 122/62 (82) 99   


 


9/4/20 08:00        100


 


9/4/20 07:45    140/78    


 


9/4/20 07:30  60 24 108/51 (70) 98   


 


9/4/20 07:29  58 30     80


 


9/4/20 07:00    108/71    


 


9/4/20 07:00  53 30 108/51 (70) 100   


 


9/4/20 06:30  54 30 113/52 (72) 99   


 


9/4/20 06:15  55 30 121/48 (72) 99   


 


9/4/20 06:00    122/57    


 


9/4/20 06:00  56 30 122/57 (78) 99   


 


9/4/20 05:47  62 30 72/47 (55) 99   


 


9/4/20 05:45    72/47    


 


9/4/20 05:30  61 26 109/44 (65) 99   


 


9/4/20 05:30    109/44    


 


9/4/20 05:05  70 30     80


 


9/4/20 05:00    112/59    


 


9/4/20 05:00  60 25 112/59 (76) 99   


 


9/4/20 04:30  62 28 103/58 (73) 99   


 


9/4/20 04:00 98.1 56 29 105/57 (73) 99   


 


9/4/20 04:00    105/57    


 


9/4/20 04:00        100


 


9/4/20 04:00      Mechanical Ventilator  





      Mechanical Ventilator  


 


9/4/20 03:30  59 29 102/53 (69) 100   


 


9/4/20 03:14  68 32     80


 


9/4/20 03:11  61      


 


9/4/20 03:00  63 25 92/53 (66) 99   


 


9/4/20 03:00    92/53    


 


9/4/20 02:30  59 28 102/56 (71) 99   


 


9/4/20 02:00  72 30 125/73 (90) 98   


 


9/4/20 02:00    125/73    


 


9/4/20 01:30  61 27 115/60 (78) 97   


 


9/4/20 01:00    102/69    


 


9/4/20 01:00  59 27 102/69 (80) 99   


 


9/4/20 00:56  63 30     80


 


9/4/20 00:30  54 30 103/58 (73) 99   


 


9/4/20 00:00        100


 


9/4/20 00:00 98.2 55 30 111/49 (69) 98   


 


9/4/20 00:00      Mechanical Ventilator  





      Mechanical Ventilator  


 


9/4/20 00:00    111/49    


 


9/3/20 23:30  57 30 108/54 (72) 99   


 


9/3/20 23:17  67      


 


9/3/20 23:00    102/49    


 


9/3/20 23:00  64 27 102/49 (66) 100   


 


9/3/20 22:54  65 30     80


 


9/3/20 22:30  59 28 109/55 (73) 100   


 


9/3/20 22:00  59 25 108/52 (70) 100   


 


9/3/20 22:00    108/52    


 


9/3/20 21:45  61 27 103/50 (67) 99   


 


9/3/20 21:30  55 30 102/55 (71) 100   


 


9/3/20 21:15  63 28 97/47 (64) 99   


 


9/3/20 21:00    100/55    


 


9/3/20 21:00  61 27 100/55 (70) 99   


 


9/3/20 20:57  68 30     80


 


9/3/20 20:45  58 26 101/51 (68) 100   


 


9/3/20 20:30  66 23 94/53 (67) 99   


 


9/3/20 20:25    118/57    


 


9/3/20 20:00        100


 


9/3/20 20:00    133/65    


 


9/3/20 20:00      Mechanical Ventilator  





      Mechanical Ventilator  


 


9/3/20 20:00 98.2 54 30 133/55 (81) 100   


 


9/3/20 19:33  60      


 


9/3/20 19:30  58 27 113/76 (88) 100   


 


9/3/20 19:14  60 30     80








General Appearance:  no apparent distress, on vent, patient on isolation


Cardiovascular:  normal rate


Respiratory/Chest:  rhonchi - bilaterally


Abdomen:  normal bowel sounds, non tender, soft


Extremities:  moderate edema











Intake and Output  


 


 9/3/20 9/4/20





 19:00 07:00


 


Intake Total 97.5 ml 1044.3725 ml


 


Output Total 175 ml 550 ml


 


Balance -77.5 ml 494.3725 ml


 


  


 


Free Water  120 ml


 


IV Total 57.5 ml 674.3725 ml


 


Tube Feeding 40 ml 250 ml


 


Output Urine Total 175 ml 550 ml











Laboratory Tests








Test


  9/4/20


04:20 9/4/20


07:30


 


White Blood Count


  9.1 K/UL


(4.8-10.8) 


 


 


Red Blood Count


  2.44 M/UL


(4.20-5.40)  L 


 


 


Hemoglobin


  8.3 G/DL


(12.0-16.0)  L 


 


 


Hematocrit


  24.6 %


(37.0-47.0)  L 


 


 


Mean Corpuscular Volume


  101 FL (80-99)


H 


 


 


Mean Corpuscular Hemoglobin


  33.9 PG


(27.0-31.0)  H 


 


 


Mean Corpuscular Hemoglobin


Concent 33.7 G/DL


(32.0-36.0) 


 


 


Red Cell Distribution Width


  12.7 %


(11.6-14.8) 


 


 


Platelet Count


  53 K/UL


(150-450)  L 


 


 


Mean Platelet Volume


  8.9 FL


(6.5-10.1) 


 


 


Neutrophils (%) (Auto)


  % (45.0-75.0)


  


 


 


Lymphocytes (%) (Auto)


  % (20.0-45.0)


  


 


 


Monocytes (%) (Auto)  % (1.0-10.0)   


 


Eosinophils (%) (Auto)  % (0.0-3.0)   


 


Basophils (%) (Auto)  % (0.0-2.0)   


 


Sodium Level


  147 MMOL/L


(136-145)  H 


 


 


Potassium Level


  3.5 MMOL/L


(3.5-5.1) 


 


 


Chloride Level


  116 MMOL/L


()  H 


 


 


Carbon Dioxide Level


  18 MMOL/L


(21-32)  L 


 


 


Anion Gap


  13 mmol/L


(5-15) 


 


 


Blood Urea Nitrogen


  43 mg/dL


(7-18)  H 


 


 


Creatinine


  1.6 MG/DL


(0.55-1.30)  H 


 


 


Estimat Glomerular Filtration


Rate 33.1 mL/min


(>60) 


 


 


Glucose Level


  99 MG/DL


() 


 


 


Calcium Level


  8.4 MG/DL


(8.5-10.1)  L 


 


 


Phosphorus Level


  3.2 MG/DL


(2.5-4.9) 


 


 


Magnesium Level


  2.2 MG/DL


(1.8-2.4) 


 


 


Total Bilirubin


  0.5 MG/DL


(0.2-1.0) 


 


 


Aspartate Amino Transf


(AST/SGOT) 28 U/L (15-37)


  


 


 


Alanine Aminotransferase


(ALT/SGPT) 14 U/L (12-78)


  


 


 


Alkaline Phosphatase


  48 U/L


() 


 


 


Total Protein


  4.4 G/DL


(6.4-8.2)  L 


 


 


Albumin


  1.4 G/DL


(3.4-5.0)  L 


 


 


Globulin 3.0 g/dL   


 


Albumin/Globulin Ratio


  0.5 (1.0-2.7)


L 


 


 


Cryptococcus Antigen Pending   


 


Arterial Blood pH


  


  7.477


(7.350-7.450)


 


Arterial Blood Partial


Pressure CO2 


  20.4 mmHg


(35.0-45.0)  *L


 


Arterial Blood Partial


Pressure O2 


  82.0 mmHg


(75.0-100.0)


 


Arterial Blood HCO3


  


  14.7 mmol/L


(22.0-26.0)  *L


 


Arterial Blood Oxygen


Saturation 


  95.1 %


()


 


Arterial Blood Base Excess  -7.5 (-2-2)  L


 


Cole Test  Positive  

















Constantine Jorgensen MD Sep 4, 2020 19:05

## 2020-09-04 NOTE — NUR
NURSE NOTES:

Bedside assessment performed, assessed pt with a FLACC scale of 0 noted. VS obtained and 
remain stable at this time. Dopamine continues to infuse at 4 mcg/kg/min with no adverse 
effects noted. Will continue to monitor and titrate accordingly. Respiratory status now 
stable, decrease in WOB and RR of 30, consistent with vent settings, noted. Enteral support 
reinitiated, will increase rate towards goal as pt condition allows. Pt remains clean and 
dry. Pt repositioned for safety and comfort. Fall, Aspiration, Seizure and Skin precautions 
observed. Pt remains resting in bed; Bed remains in the lowest position with the safety 
wheels engaged, call light within reach, side rails up x3 and bed alarm activated. Will 
continue plan of care. Will continue to monitor.

## 2020-09-04 NOTE — INTERNAL MED PROGRESS NOTE
Subjective


Physician Name


Tyson Hung


Attending Physician


Chano Cherry MD





Current Medications








 Medications


  (Trade)  Dose


 Ordered  Sig/Didi


 Route


 PRN Reason  Start Time


 Stop Time Status Last Admin


Dose Admin


 


 Acetaminophen


  (Tylenol)  650 mg  Q4H  PRN


 GT


 FEVER  8/26/20 11:45


 9/25/20 11:44  8/29/20 23:15


 


 


 Ascorbic Acid


  (Vitamin C)  250 mg  DAILY


 GT


   9/3/20 09:00


 10/3/20 08:59  9/4/20 08:24


 


 


 Chlorhexidine


 Gluconate


  (Beatrice-Hex 2%)  1 applic  DAILY@2000


 TOPIC


   8/31/20 20:00


 11/29/20 19:59  9/4/20 20:33


 


 


 Clotrimazole


  (Lotrimin)  1 applic  Q12HR


 TOPIC


   8/30/20 13:00


 11/28/20 12:59  9/4/20 21:18


 


 


 Daptomycin 350 mg/


 Sodium Chloride  55 ml @ 


 100 mls/hr  Q48H


 IV


   8/28/20 11:00


 9/5/20 10:59  9/3/20 11:25


 


 


 Dextrose


  (Dextrose 50%)  25 ml  Q30M  PRN


 IV


 Hypoglycemia  8/26/20 11:30


 11/20/20 11:29   


 


 


 Dextrose


  (Dextrose 50%)  50 ml  Q30M  PRN


 IV


 Hypoglycemia  8/26/20 11:30


 11/20/20 11:29   


 


 


 Dopamine HCl/


 Dextrose  250 ml @ 0


 mls/hr  Q24H


 IV


   9/4/20 11:15


 12/3/20 11:14  9/4/20 11:19


 


 


 Hydrocortisone


  (Solu-CORTEF)  100 mg  EVERY 8  HOURS


 IV


   8/30/20 14:00


 11/28/20 13:59  9/4/20 21:19


 


 


 Levothyroxine


 Sodium


  (Synthroid)  75 mcg  DAILY


 GT


   8/27/20 09:00


 9/22/20 08:59  9/4/20 08:24


 


 


 Lorazepam


  (Ativan 2mg/ml


 1ml)  2 mg  Q2H  PRN


 IV


 For Anxiety  9/4/20 17:30


 9/11/20 17:29  9/4/20 17:37


 


 


 Meropenem 1 gm/


 Sodium Chloride  55 ml @ 


 110 mls/hr  Q12H


 IVPB


   8/26/20 16:00


 9/10/20 15:59  9/4/20 15:28


 


 


 Midodrine


  (Pro-Amatine)  10 mg  Q8HR


 GT


   8/28/20 14:00


 11/26/20 13:59  9/4/20 05:19


 


 


 Norepinephrine


 Bitartrate 16 mg/


 Dextrose  500 ml @ 0


 mls/hr  Q24H


 IV


   8/31/20 07:45


 9/29/20 12:59  9/4/20 08:43


 


 


 Ondansetron HCl


  (Zofran)  4 mg  Q6H  PRN


 IVP


 Nausea & Vomiting  8/26/20 12:00


 9/21/20 11:59   


 


 


 Pantoprazole


  (Protonix)  40 mg  DAILY


 IV


   8/27/20 09:00


 9/22/20 08:59  9/4/20 08:24


 


 


 Phenylephrine HCl


 50 mg/Dextrose  250 ml @ 0


 mls/hr  Q24H  PRN


 IV


 For hypotension  8/29/20 17:45


 9/28/20 17:44  8/31/20 01:49


 


 


 Polyethylene


 Glycol


  (Miralax)  17 gm  DAILYPRN  PRN


 GT


 Constipation  8/26/20 12:00


 9/21/20 11:59   


 


 


 Valproic Acid


  (Depakene)  500 mg  EVERY 8  HOURS


 GT


   8/26/20 14:00


 9/21/20 21:59  9/4/20 21:19


 








Allergies:  


Coded Allergies:  


     No Known Allergies (Unverified , 1/8/19)


Subjective


in ICU, remained intubated on the vent, open eyes, unable to follow command. WBC

: 9.1. Renal function improving.





Objective





Last Vital Signs








  Date Time  Temp Pulse Resp B/P (MAP) Pulse Ox O2 Delivery O2 Flow Rate FiO2


 


9/4/20 20:43  50 30     100


 


9/4/20 20:30    120/57 (78) 100   


 


9/4/20 20:00      Mechanical Ventilator  





      Mechanical Ventilator  


 


9/4/20 16:00 98.0       


 


8/27/20 00:00       15.0 











Laboratory Tests








Test


  9/4/20


04:20 9/4/20


07:30


 


White Blood Count


  9.1 K/UL


(4.8-10.8) 


 


 


Red Blood Count


  2.44 M/UL


(4.20-5.40)  L 


 


 


Hemoglobin


  8.3 G/DL


(12.0-16.0)  L 


 


 


Hematocrit


  24.6 %


(37.0-47.0)  L 


 


 


Mean Corpuscular Volume


  101 FL (80-99)


H 


 


 


Mean Corpuscular Hemoglobin


  33.9 PG


(27.0-31.0)  H 


 


 


Mean Corpuscular Hemoglobin


Concent 33.7 G/DL


(32.0-36.0) 


 


 


Red Cell Distribution Width


  12.7 %


(11.6-14.8) 


 


 


Platelet Count


  53 K/UL


(150-450)  L 


 


 


Mean Platelet Volume


  8.9 FL


(6.5-10.1) 


 


 


Neutrophils (%) (Auto)


  % (45.0-75.0)


  


 


 


Lymphocytes (%) (Auto)


  % (20.0-45.0)


  


 


 


Monocytes (%) (Auto)  % (1.0-10.0)   


 


Eosinophils (%) (Auto)  % (0.0-3.0)   


 


Basophils (%) (Auto)  % (0.0-2.0)   


 


Sodium Level


  147 MMOL/L


(136-145)  H 


 


 


Potassium Level


  3.5 MMOL/L


(3.5-5.1) 


 


 


Chloride Level


  116 MMOL/L


()  H 


 


 


Carbon Dioxide Level


  18 MMOL/L


(21-32)  L 


 


 


Anion Gap


  13 mmol/L


(5-15) 


 


 


Blood Urea Nitrogen


  43 mg/dL


(7-18)  H 


 


 


Creatinine


  1.6 MG/DL


(0.55-1.30)  H 


 


 


Estimat Glomerular Filtration


Rate 33.1 mL/min


(>60) 


 


 


Glucose Level


  99 MG/DL


() 


 


 


Calcium Level


  8.4 MG/DL


(8.5-10.1)  L 


 


 


Phosphorus Level


  3.2 MG/DL


(2.5-4.9) 


 


 


Magnesium Level


  2.2 MG/DL


(1.8-2.4) 


 


 


Total Bilirubin


  0.5 MG/DL


(0.2-1.0) 


 


 


Aspartate Amino Transf


(AST/SGOT) 28 U/L (15-37)


  


 


 


Alanine Aminotransferase


(ALT/SGPT) 14 U/L (12-78)


  


 


 


Alkaline Phosphatase


  48 U/L


() 


 


 


Total Protein


  4.4 G/DL


(6.4-8.2)  L 


 


 


Albumin


  1.4 G/DL


(3.4-5.0)  L 


 


 


Globulin 3.0 g/dL   


 


Albumin/Globulin Ratio


  0.5 (1.0-2.7)


L 


 


 


Cryptococcus Antigen Pending   


 


Arterial Blood pH


  


  7.477


(7.350-7.450)


 


Arterial Blood Partial


Pressure CO2 


  20.4 mmHg


(35.0-45.0)  *L


 


Arterial Blood Partial


Pressure O2 


  82.0 mmHg


(75.0-100.0)


 


Arterial Blood HCO3


  


  14.7 mmol/L


(22.0-26.0)  *L


 


Arterial Blood Oxygen


Saturation 


  95.1 %


()


 


Arterial Blood Base Excess  -7.5 (-2-2)  L


 


Cole Test  Positive  

















Intake and Output  


 


 9/3/20 9/4/20





 19:00 07:00


 


Intake Total 97.5 ml 1044.3725 ml


 


Output Total 175 ml 550 ml


 


Balance -77.5 ml 494.3725 ml


 


  


 


Free Water  120 ml


 


IV Total 57.5 ml 674.3725 ml


 


Tube Feeding 40 ml 250 ml


 


Output Urine Total 175 ml 550 ml








Objective


General: remain intubated, unable to follow command, open eyes.


HEENT: NCAT, sclera anicteric, PERRL, ET Tube.


Neck: Supple, no significant jugular venous distention, 


Lungs: mechanical breath sounds decreased air at the bases,  no Wheeze or Rales.


Heart: Regular rate and rhythm, normal S1/S2, no murmurs/gallops


Abdomen: soft, not tender, not distended. + bowel sounds, Morbid Obesity, PEG 

site intact.


Extremities: No Cyanosis , clubbing,  upper extremities edema. left upper 

extremity PICC line.


Neuro: limited secondary to patient status, unable to follow command, bilateral 

upper and lower extremities contracted.





Assessment/Plan


Assessment/Plan


Problem List:  


(1) HCAP (healthcare-associated pneumonia)


(2) Sepsis


(3) Down's syndrome


(4) Dysphagia S/P PEG


(5) Seizure disorder


(6) Hypothyroidism


(7) Acute Hypoxemic respiratory failure


(8) Persistent, high grade bacteremia-  r/o endocarditis


(9) DAVID





Plan: 


Tolerated tube feeding @ 30 cc/hr


Follow-up with laboratory and cultures


on Levophed drip and dopamine drip.


Hydrocortisone 100 mg IV every 8


Cont Daptomycin #11(abx d #14/14) for GPC bacteremia in view of DAVID.


Cont Meropenem#10/10-14 (abx d #14) given resp decompensation


chest x-ray: Improving.


Lasix 20 mg IV 1 dose











Tyson Hung MD Sep 4, 2020 21:35

## 2020-09-04 NOTE — INFECTIOUS DISEASES PROG NOTE
Assessment/Plan








ASSESSMENT:


sp code blue 8/26





Septic Shock; SP


Fever, SP


Leukocytosis; SP


  -8/26 u/a no pyuria





Pneumonia.- COVID 19 neg x3


   Acute hypoxic resp failure on VM> NRB 15l 100%; hypoxic on ABG> now VDRF 8/26

- Fio2 80% >100% 8/28> 60% 9/1 >80% 9/2


         -9/1 sp cx Neg


         8/31 CXR: Extensive bilateral interstitial and airspace disease 

appears similar to the prior exam. Moderate to large bilateral pleural 

effusions appear unchanged.


   8/28 CXR: Increasing left upper lobe dense consolidation and likely 

increasing bilateral pleural fluid. Persistent diffuse dense consolidation 

elsewhere


            -8/26 sp cx normal resp lilliam


             -8/25 CXR: Increased atelectasis of the right lung, since prior 

exam of 3 days earlier. New or increased right pleural effusion. Increased left 

basilar consolidation and/or pleural fluid 


          -COVID Rapid PCR neg 8/23, 8/23, 8/26


          -8/22 spc x Group G strep


          -8/22 CXR:   Reduced lung volumes.  Patchy bilateral predominantly 

interstitial  pulmonary opacities.  Could be from edema and/or pneumonia.  

There is a broader differential.


 


        -legionella ag urine, blasto ab, Histo ab, HIV ab screen neg





Persistent, high grade bacteremia-


     -8/22 Bcx 4/4 sets S. haemolyticus; 8/23 Bcx 3/4 S/ epi; 8/27 Bcx 1.4 S. 

warnerri; 8/29 Bcx Neg


     -2d echo: no vegetaions seen





          ua/ wbc 10-15, nit neg, leuk +1; ucx Neg





DAVID; 


 -supratherapeutic vanco levels





-Seizure disorder.


- Hypothyroidism.


- Down syndrome.





History of PEG tube placement.


[facility] resident





PLAN:


Cont Daptomycin #11(abx d #14/14) for GPC bacteremia in view of DAVID [monitor CK

, was 51 on 8/26]


Cont Meropenem#10/10-14 (abx d #14) given resp decompensation


   -9/2 SP MIcafungin #7, Linezolid #5





f/u Repeat BCx given persistent bacteremia


CT chest when stable enough, not currently given on FiO2 100% and pressors


If pleural effusions, should be tapped to r/o empyema, send for bacterial cx as 

well as cell count and diff


F/u cx of exudate around G-tube


Trend GIB (black stools)





   8/28 SP Azithromycin #7/7


   8/26 SP Ceftriaxone #2


   8/25 SP IV Vancomycin #4, Zosyn #4


   8/22 SP Cefepime x1, Flagyl x1





- Monitor CBC, BMP.


-f/u Cocci ab, CrAg


.f/u  Repeat cx


- COVID neg x3


- Monitor chest x-ray.


- Monitor the patient's clinical course and labs.  Based on those, we will do 

further recommendation.





Thank you, Dr. Cherry, for allowing me to participate in the care of


this patient.  I will follow the patient with you at this


hospitalization.








Discussed with RN





Subjective


Allergies:  


Coded Allergies:  


     No Known Allergies (Unverified , 1/8/19)


afebrile 


remains intubated, Fio2 80%


off pressors now





Objective





Last 24 Hour Vital Signs








  Date Time  Temp Pulse Resp B/P (MAP) Pulse Ox O2 Delivery O2 Flow Rate FiO2


 


9/4/20 13:00  58 29 154/56 (88) 95   


 


9/4/20 13:00    154/58    


 


9/4/20 12:51  53 30     80


 


9/4/20 12:45  53 30 122/50 (74) 96   


 


9/4/20 12:30  53 30 123/54 (77) 95   


 


9/4/20 12:15  55 30 126/57 (80) 95   


 


9/4/20 12:00 98.6 57 30 123/48 (73) 96   


 


9/4/20 12:00      Mechanical Ventilator  





      Mechanical Ventilator  


 


9/4/20 12:00        100


 


9/4/20 12:00  61      


 


9/4/20 12:00    128/57    


 


9/4/20 11:45  78 28 151/74 (99) 95   


 


9/4/20 11:30  57 28 111/74 (86) 99   


 


9/4/20 11:26  66 30     80


 


9/4/20 11:19    100/46    


 


9/4/20 11:15  49 30 100/46 (64) 99   


 


9/4/20 11:00  50 30 113/52 (72) 100   


 


9/4/20 10:45  54 30 98/50 (66) 100   


 


9/4/20 10:30  58 29 109/53 (71) 99   


 


9/4/20 10:15  55 30 108/52 (70) 100   


 


9/4/20 10:00    98/50    


 


9/4/20 10:00  57 27 96/50 (65) 99   


 


9/4/20 09:45  61 29 107/68 (81) 99   


 


9/4/20 09:30  55 27 126/57 (80) 97   


 


9/4/20 09:15  53 30 111/51 (71) 100   


 


9/4/20 09:00  59 28 109/53 (71) 100   


 


9/4/20 09:00    107/68    


 


9/4/20 08:57  54 30     80


 


9/4/20 08:45  61 25 116/47 (70) 99   


 


9/4/20 08:43    135/58    


 


9/4/20 08:30  53 30 135/58 (83) 99   


 


9/4/20 08:15 98.4 53 30 140/65 (90) 99   


 


9/4/20 08:00  51      


 


9/4/20 08:00      Mechanical Ventilator  





      Mechanical Ventilator  


 


9/4/20 08:00  53 29 122/62 (82) 99   


 


9/4/20 08:00        100


 


9/4/20 07:45    140/78    


 


9/4/20 07:30  60 24 108/51 (70) 98   


 


9/4/20 07:29  58 30     80


 


9/4/20 07:00    108/71    


 


9/4/20 07:00  53 30 108/51 (70) 100   


 


9/4/20 06:30  54 30 113/52 (72) 99   


 


9/4/20 06:15  55 30 121/48 (72) 99   


 


9/4/20 06:00    122/57    


 


9/4/20 06:00  56 30 122/57 (78) 99   


 


9/4/20 05:47  62 30 72/47 (55) 99   


 


9/4/20 05:45    72/47    


 


9/4/20 05:30  61 26 109/44 (65) 99   


 


9/4/20 05:30    109/44    


 


9/4/20 05:05  70 30     80


 


9/4/20 05:00    112/59    


 


9/4/20 05:00  60 25 112/59 (76) 99   


 


9/4/20 04:30  62 28 103/58 (73) 99   


 


9/4/20 04:00 98.1 56 29 105/57 (73) 99   


 


9/4/20 04:00    105/57    


 


9/4/20 04:00        100


 


9/4/20 04:00      Mechanical Ventilator  





      Mechanical Ventilator  


 


9/4/20 03:30  59 29 102/53 (69) 100   


 


9/4/20 03:14  68 32     80


 


9/4/20 03:11  61      


 


9/4/20 03:00  63 25 92/53 (66) 99   


 


9/4/20 03:00    92/53    


 


9/4/20 02:30  59 28 102/56 (71) 99   


 


9/4/20 02:00  72 30 125/73 (90) 98   


 


9/4/20 02:00    125/73    


 


9/4/20 01:30  61 27 115/60 (78) 97   


 


9/4/20 01:00    102/69    


 


9/4/20 01:00  59 27 102/69 (80) 99   


 


9/4/20 00:56  63 30     80


 


9/4/20 00:30  54 30 103/58 (73) 99   


 


9/4/20 00:00        100


 


9/4/20 00:00 98.2 55 30 111/49 (69) 98   


 


9/4/20 00:00      Mechanical Ventilator  





      Mechanical Ventilator  


 


9/4/20 00:00    111/49    


 


9/3/20 23:30  57 30 108/54 (72) 99   


 


9/3/20 23:17  67      


 


9/3/20 23:00    102/49    


 


9/3/20 23:00  64 27 102/49 (66) 100   


 


9/3/20 22:54  65 30     80


 


9/3/20 22:30  59 28 109/55 (73) 100   


 


9/3/20 22:00  59 25 108/52 (70) 100   


 


9/3/20 22:00    108/52    


 


9/3/20 21:45  61 27 103/50 (67) 99   


 


9/3/20 21:30  55 30 102/55 (71) 100   


 


9/3/20 21:15  63 28 97/47 (64) 99   


 


9/3/20 21:00    100/55    


 


9/3/20 21:00  61 27 100/55 (70) 99   


 


9/3/20 20:57  68 30     80


 


9/3/20 20:45  58 26 101/51 (68) 100   


 


9/3/20 20:30  66 23 94/53 (67) 99   


 


9/3/20 20:25    118/57    


 


9/3/20 20:00        100


 


9/3/20 20:00    133/65    


 


9/3/20 20:00      Mechanical Ventilator  





      Mechanical Ventilator  


 


9/3/20 20:00 98.2 54 30 133/55 (81) 100   


 


9/3/20 19:33  60      


 


9/3/20 19:30  58 27 113/76 (88) 100   


 


9/3/20 19:14  60 30     80


 


9/3/20 19:00  59 25 117/65 (82) 100   


 


9/3/20 19:00    117/65    


 


9/3/20 16:00  66      








Height (Feet):  5


Height (Inches):  3.00


Weight (Pounds):  172


HEENT:  No pale conjunctivae.  No icterus. ETT in place


NECK:  No lymphadenopathy.


CHEST:  Coarse breathing sounds.


HEART:  S1 and S2.


ABDOMEN:  Soft.  PEG tube in place.


EXTREMITIES:  No cyanosis at this time .


SKIN: no rash





Microbiology








 Date/Time


Source Procedure


Growth Status


 


 


 9/1/20 16:14


Sputum Gram Stain - Final Complete


 


 9/1/20 16:14


Sputum Sputum Culture - Final


NO GROWTH AFTER 48 HOURS Complete











Laboratory Tests








Test


  9/4/20


04:20 9/4/20


07:30


 


White Blood Count


  9.1 K/UL


(4.8-10.8) 


 


 


Red Blood Count


  2.44 M/UL


(4.20-5.40)  L 


 


 


Hemoglobin


  8.3 G/DL


(12.0-16.0)  L 


 


 


Hematocrit


  24.6 %


(37.0-47.0)  L 


 


 


Mean Corpuscular Volume


  101 FL (80-99)


H 


 


 


Mean Corpuscular Hemoglobin


  33.9 PG


(27.0-31.0)  H 


 


 


Mean Corpuscular Hemoglobin


Concent 33.7 G/DL


(32.0-36.0) 


 


 


Red Cell Distribution Width


  12.7 %


(11.6-14.8) 


 


 


Platelet Count


  53 K/UL


(150-450)  L 


 


 


Mean Platelet Volume


  8.9 FL


(6.5-10.1) 


 


 


Neutrophils (%) (Auto)


  % (45.0-75.0)


  


 


 


Lymphocytes (%) (Auto)


  % (20.0-45.0)


  


 


 


Monocytes (%) (Auto)  % (1.0-10.0)   


 


Eosinophils (%) (Auto)  % (0.0-3.0)   


 


Basophils (%) (Auto)  % (0.0-2.0)   


 


Sodium Level


  147 MMOL/L


(136-145)  H 


 


 


Potassium Level


  3.5 MMOL/L


(3.5-5.1) 


 


 


Chloride Level


  116 MMOL/L


()  H 


 


 


Carbon Dioxide Level


  18 MMOL/L


(21-32)  L 


 


 


Anion Gap


  13 mmol/L


(5-15) 


 


 


Blood Urea Nitrogen


  43 mg/dL


(7-18)  H 


 


 


Creatinine


  1.6 MG/DL


(0.55-1.30)  H 


 


 


Estimat Glomerular Filtration


Rate 33.1 mL/min


(>60) 


 


 


Glucose Level


  99 MG/DL


() 


 


 


Calcium Level


  8.4 MG/DL


(8.5-10.1)  L 


 


 


Phosphorus Level


  3.2 MG/DL


(2.5-4.9) 


 


 


Magnesium Level


  2.2 MG/DL


(1.8-2.4) 


 


 


Total Bilirubin


  0.5 MG/DL


(0.2-1.0) 


 


 


Aspartate Amino Transf


(AST/SGOT) 28 U/L (15-37)


  


 


 


Alanine Aminotransferase


(ALT/SGPT) 14 U/L (12-78)


  


 


 


Alkaline Phosphatase


  48 U/L


() 


 


 


Total Protein


  4.4 G/DL


(6.4-8.2)  L 


 


 


Albumin


  1.4 G/DL


(3.4-5.0)  L 


 


 


Globulin 3.0 g/dL   


 


Albumin/Globulin Ratio


  0.5 (1.0-2.7)


L 


 


 


Cryptococcus Antigen Pending   


 


Arterial Blood pH


  


  7.477


(7.350-7.450)


 


Arterial Blood Partial


Pressure CO2 


  20.4 mmHg


(35.0-45.0)  *L


 


Arterial Blood Partial


Pressure O2 


  82.0 mmHg


(75.0-100.0)


 


Arterial Blood HCO3


  


  14.7 mmol/L


(22.0-26.0)  *L


 


Arterial Blood Oxygen


Saturation 


  95.1 %


()


 


Arterial Blood Base Excess  -7.5 (-2-2)  L


 


Cole Test  Positive  











Current Medications








 Medications


  (Trade)  Dose


 Ordered  Sig/Didi


 Route


 PRN Reason  Start Time


 Stop Time Status Last Admin


Dose Admin


 


 Acetaminophen


  (Tylenol)  650 mg  Q4H  PRN


 GT


 FEVER  8/26/20 11:45


 9/25/20 11:44  8/29/20 23:15


 


 


 Ascorbic Acid


  (Vitamin C)  250 mg  DAILY


 GT


   9/3/20 09:00


 10/3/20 08:59  9/4/20 08:24


 


 


 Chlorhexidine


 Gluconate


  (Beatrice-Hex 2%)  1 applic  DAILY@2000


 TOPIC


   8/31/20 20:00


 11/29/20 19:59  9/3/20 20:11


 


 


 Clotrimazole


  (Lotrimin)  1 applic  Q12HR


 TOPIC


   8/30/20 13:00


 11/28/20 12:59  9/4/20 08:42


 


 


 Daptomycin 350 mg/


 Sodium Chloride  55 ml @ 


 100 mls/hr  Q48H


 IV


   8/28/20 11:00


 9/5/20 10:59  9/3/20 11:25


 


 


 Dextrose


  (Dextrose 50%)  25 ml  Q30M  PRN


 IV


 Hypoglycemia  8/26/20 11:30


 11/20/20 11:29   


 


 


 Dextrose


  (Dextrose 50%)  50 ml  Q30M  PRN


 IV


 Hypoglycemia  8/26/20 11:30


 11/20/20 11:29   


 


 


 Dopamine HCl/


 Dextrose  250 ml @ 0


 mls/hr  Q24H


 IV


   9/4/20 11:15


 12/3/20 11:14  9/4/20 11:19


 


 


 Hydrocortisone


  (Solu-CORTEF)  100 mg  EVERY 8  HOURS


 IV


   8/30/20 14:00


 11/28/20 13:59  9/4/20 13:30


 


 


 Levothyroxine


 Sodium


  (Synthroid)  75 mcg  DAILY


 GT


   8/27/20 09:00


 9/22/20 08:59  9/4/20 08:24


 


 


 Meropenem 1 gm/


 Sodium Chloride  55 ml @ 


 110 mls/hr  Q12H


 IVPB


   8/26/20 16:00


 9/5/20 15:59  9/4/20 04:12


 


 


 Midodrine


  (Pro-Amatine)  10 mg  Q8HR


 GT


   8/28/20 14:00


 11/26/20 13:59  9/4/20 05:19


 


 


 Norepinephrine


 Bitartrate 16 mg/


 Dextrose  500 ml @ 0


 mls/hr  Q24H


 IV


   8/31/20 07:45


 9/29/20 12:59  9/4/20 08:43


 


 


 Ondansetron HCl


  (Zofran)  4 mg  Q6H  PRN


 IVP


 Nausea & Vomiting  8/26/20 12:00


 9/21/20 11:59   


 


 


 Pantoprazole


  (Protonix)  40 mg  DAILY


 IV


   8/27/20 09:00


 9/22/20 08:59  9/4/20 08:24


 


 


 Phenylephrine HCl


 50 mg/Dextrose  250 ml @ 0


 mls/hr  Q24H  PRN


 IV


 For hypotension  8/29/20 17:45


 9/28/20 17:44  8/31/20 01:49


 


 


 Polyethylene


 Glycol


  (Miralax)  17 gm  DAILYPRN  PRN


 GT


 Constipation  8/26/20 12:00


 9/21/20 11:59   


 


 


 Valproic Acid


  (Depakene)  500 mg  EVERY 8  HOURS


 GT


   8/26/20 14:00


 9/21/20 21:59  9/4/20 13:30


 

















Rema Gomes M.D. Sep 4, 2020 14:02

## 2020-09-04 NOTE — NUR
Nursing Student:

Patient seen by Dr Cherry, will follow up with new orders.Made aware Sodium level. Turned 
and repositioned, no significant change in condition.Off Levophed ,will continue close 
monitoring.HOB remains elevated to prevent aspiration.

-------------------------------------------------------------------------------

Addendum: 09/04/20 at 1043 by Patti Krishnan RN

-------------------------------------------------------------------------------

NURSE NOTES:

Patient seen by Dr Cherry, will follow up with new orders.Made aware Sodium level. Turned 
and repositioned, no significant change in condition.Off Levophed ,will continue close 
monitoring.HOB remains elevated to prevent aspiration.

## 2020-09-04 NOTE — NUR
NURSE NOTES:

Pt provided with a CHG bed bath, oral care and linen change. ROM exercises performed per pt 
tolerance. Pt tolerated care well. Bedside assessment performed, assessed pt with a FLACC 
scale of 0 noted. VS obtained and remain stable at this time. Dopamine continues to infuse 
at 2 mcg/kg/min with no adverse effects noted from medication administration. Decreased 
Dopamine from 4mcg/kg/min r/t increasing SBP with static HR. VS obtained and SBP remains 
>100 and HR remains >50 at this time. Respiratory status remains stable, RR remains between 
30-32 consistent with ventilator settings. WOB notably decreased from start of shift. Pt 
remains clean and dry. Pt repositioned for comfort and safety. Fall, Aspiration, Seizure and 
Skin precautions observed. Pt remains resting in bed; Bed remains in the lowest position 
with the safety wheels engaged, call light within reach, side rails up x3 and bed alarm 
activated. Will continue plan of care. Will continue to monitor.

## 2020-09-04 NOTE — NUR
NURSE NOTES:

Bedside assessment performed, assessed pt with a FLACC scale of 0 noted. VS obtained and 
remain stable at this time. Levophed continues to infuse at 2 mcg/min with no adverse 
effects noted. SBP remains >90 at this time, will continue to monitor and titrate 
accordingly. NS continues infusing at 50 mL/hr as ordered without incident. GT residual 
noted to be 10mL, will reassess and increase infusion rate as indicated towards goal. Wound 
care performed per orders without incident. Pt tolerated care well. Fall, Aspiration, 
Seizure and Skin precautions observed. Pt remains resting in bed; Bed remains in the lowest 
position with the safety wheels engaged, call light within reach, side rails up x3 and bed 
alarm activated. Will continue plan of care. Will continue to monitor.

## 2020-09-04 NOTE — NUR
NURSE NOTES:

Patient was noted for episode of bradycardia at 48, Dr Cherry aware order to start Dopamine 
drip

## 2020-09-04 NOTE — NUR
NURSE NOTES:

Report received from LAYTON Diggs. Patient noted with short episode of HR 49 for less than 1 
second and go back to between 51-66, message left to Dr Wang, awaiting new 
order.Patient afebrile and orally intubated 7.5/22 AC 30  FIO2 100% Peep 10.On GT 
feeding Jevity 1.2 at 30cc/hr goal 50cc. Tolerate well at this time and placement 
intact.Keep HOB elevated at 35 degree to prevent risks for aspiration.On P200 mattress for 
sacral wound stage II. CARLOS PICC dressing clean dry and intact running Levophed at 2mcg/hr 
and NS at 50 cc/hr.Valle catheter intact draining clear yellow urine with no apparent 
sediments.Turned and repositioned,kept clean and dry.Call light within easy reach.Will 
continue same care plan

-------------------------------------------------------------------------------

Addendum: 09/04/20 at 0751 by Patti Krishnan RN

-------------------------------------------------------------------------------

Per Dr Wang if patient does not need dopamine for BP than she does not need it for 
bradycardia yet.

## 2020-09-04 NOTE — NUR
NURSE NOTES:

Received patient and report from LAYTON Armstrong. Patient is observed resting in bed and noted to 
be lethargic but responsive to stimuli; withdraws to pain, opens eyes spontaneously, SMITH 
and not noted to track at this time. Pt is currently unable to follow commands. No pain 
noted upon assessment. Pt is currently orally intubated ett size 7.5 noted to be 22 @ lip. 
Vent settings as follows: AC 30  FiO2 100% PEEP 10 with an O2 saturation of 97% noted 
at this time. Pt noted to be tachypneic with increased WOB. RT present at bedside to assist 
with pt care, mucous plug noted and tubing changed as indicated. Bilateral lower lobe breath 
sounds noted to be diminished upon auscultation and rhonchi noted in bilateral upper and 
lower lobes. Pt noted to be SR on tele monitor with a HR of 61 with no s/sx of acute cardiac 
distress noted. Dr Jorgensen present at bedside to assess patient. Left upper arm PICC line 
noted which remains asymptomatic, intact and patent. Central line dressing remains clean 
,dry and intact. Dopamine currently infusing at 4mch/kg/min. No s/sx of adverse effects 
noted at this time from medication administration. G tube noted which remains intact and 
patent. Jevity 1.2 currently paused due to respiratory condition; no residual noted. 
Hypoactive bowel sounds noted in all four quadrants, abdomen remains large, soft and round. 
Diagnostics reviewed at bedside. Skin alterations noted. Pt repositioned for comfort and 
safety. Fall, Aspiration, Seizure and Skin precautions observed. Pt remains resting in bed; 
Bed remains in the lowest position with the safety wheels engaged, call light within reach, 
side rails up x3 and bed alarm activated. Will continue plan of care. Will continue to 
monitor.

## 2020-09-04 NOTE — NUR
NURSE NOTES:

ADLs done, mouth care performed,patient suctioned as tolerated.No significant change at this 
time.Feeding increase to 40cc/hr and tolerate well.Turned and repositioned.HOB elevated to 
prevent aspiration.Call light within easy reach.Kept clean dry and comfortable.Will continue 
same care plan

## 2020-09-04 NOTE — NUR
NURSE NOTES:

Bedside assessment performed, assessed pt with a FLACC scale of 0 noted. VS obtained and 
remain stable at this time. Levophed continues to infuse at 2 mcg/min with no adverse 
effects noted. SBP remains >90 at this time, will continue to monitor and titrate 
accordingly. RT present at bedside performing pt care. Pt noted to have a large amount of 
thick, clear/white secretions. Pt O2 saturation remains between % at this time. Fall, 
Aspiration, Seizure and Skin precautions observed. Pt remains resting in bed; Bed remains in 
the lowest position with the safety wheels engaged, call light within reach, side rails up 
x3 and bed alarm activated. Will continue plan of care. Will continue to monitor.

## 2020-09-04 NOTE — NUR
NURSE NOTES:

Patient turned and repositioned, no significant change. Continue titrate Dopamine as 
tolerated. Tolerate well feeding.Call light within easy reach.

## 2020-09-04 NOTE — NUR
NURSE NOTES:

Patient seen by Dr Hung, order to give 1 dose of Lasix 20mg, order noted and carry out

## 2020-09-04 NOTE — NUR
NURSE NOTES:

Bedside assessment performed, assessed pt with a FLACC scale of 0 noted. VS obtained and 
remain stable at this time. Levophed continues to infuse at 2 mcg/min with no adverse 
effects noted. SBP remains >90 at this time, will continue to monitor and titrate 
accordingly. RT present at bedside performing pt care. Pt still noted to have a large amount 
of secretions. Pt O2 saturation remains between % at this time. Fall, Aspiration, 
Seizure and Skin precautions observed. Pt remains resting in bed; Bed remains in the lowest 
position with the safety wheels engaged, call light within reach, side rails up x3 and bed 
alarm activated. Will continue plan of care. Will continue to monitor.

## 2020-09-04 NOTE — NUR
HAND-OFF: 

Report given to LAYTON Diggs.Patient seen by Dr Wang endorsed to next nurse to follow up 
with new orders.Patient seen by Dr Wang,endorsed to next nurse for follow up.

## 2020-09-04 NOTE — PULMONOLGY CRITICAL CARE NOTE
Critical Care - Asmt/Plan


Problems:  


(1) Acute respiratory failure


(2) Bacteremia


(3) Pneumonia


(4) Sepsis


(5) HCAP (healthcare-associated pneumonia)


(6) Seizure disorder


(7) Down's syndrome


Respiratory:  monitor respiratory rate, adjust FIO2, CXR


Cardiac:  continue pressors, continue to monitor HR/BP


Renal:  F/U I&O, keep IV fluid, check electrolytes


Infectious Disease:  check cultures, continue antibiotics


Gastrointestinal:  continue feedings/current rate


Endocrine:  monitor blood sugar, check HgA1C


Neurologic:  PRN Ativan, PRN Morphine


Affect:  PRN ativan


Prophylaxis:  Heparin


Time Spent (Minutes):  40


Notes Reviewed:  internist, cardio, renal, ID





Critical Care - Objective





Last 24 Hour Vital Signs








  Date Time  Temp Pulse Resp B/P (MAP) Pulse Ox O2 Delivery O2 Flow Rate FiO2


 


9/4/20 11:00  50 30 113/52 (72) 100   


 


9/4/20 10:45  54 30 98/50 (66) 100   


 


9/4/20 10:30  58 29 109/53 (71) 99   


 


9/4/20 10:15  55 30 108/52 (70) 100   


 


9/4/20 10:00    98/50    


 


9/4/20 10:00  57 27 96/50 (65) 99   


 


9/4/20 09:45  61 29 107/68 (81) 99   


 


9/4/20 09:30  55 27 126/57 (80) 97   


 


9/4/20 09:15  53 30 111/51 (71) 100   


 


9/4/20 09:00  59 28 109/53 (71) 100   


 


9/4/20 09:00    107/68    


 


9/4/20 08:57  54 30     80


 


9/4/20 08:45  61 25 116/47 (70) 99   


 


9/4/20 08:43    135/58    


 


9/4/20 08:30  53 30 135/58 (83) 99   


 


9/4/20 08:15 98.4 53 30 140/65 (90) 99   


 


9/4/20 08:00  51      


 


9/4/20 08:00      Mechanical Ventilator  





      Mechanical Ventilator  


 


9/4/20 08:00  53 29 122/62 (82) 99   


 


9/4/20 08:00        100


 


9/4/20 07:45    140/78    


 


9/4/20 07:30  60 24 108/51 (70) 98   


 


9/4/20 07:29  58 30     80


 


9/4/20 07:00    108/71    


 


9/4/20 07:00  53 30 108/51 (70) 100   


 


9/4/20 06:30  54 30 113/52 (72) 99   


 


9/4/20 06:15  55 30 121/48 (72) 99   


 


9/4/20 06:00    122/57    


 


9/4/20 06:00  56 30 122/57 (78) 99   


 


9/4/20 05:47  62 30 72/47 (55) 99   


 


9/4/20 05:45    72/47    


 


9/4/20 05:30  61 26 109/44 (65) 99   


 


9/4/20 05:30    109/44    


 


9/4/20 05:05  70 30     80


 


9/4/20 05:00    112/59    


 


9/4/20 05:00  60 25 112/59 (76) 99   


 


9/4/20 04:30  62 28 103/58 (73) 99   


 


9/4/20 04:00 98.1 56 29 105/57 (73) 99   


 


9/4/20 04:00    105/57    


 


9/4/20 04:00        100


 


9/4/20 04:00      Mechanical Ventilator  





      Mechanical Ventilator  


 


9/4/20 03:30  59 29 102/53 (69) 100   


 


9/4/20 03:14  68 32     80


 


9/4/20 03:11  61      


 


9/4/20 03:00  63 25 92/53 (66) 99   


 


9/4/20 03:00    92/53    


 


9/4/20 02:30  59 28 102/56 (71) 99   


 


9/4/20 02:00  72 30 125/73 (90) 98   


 


9/4/20 02:00    125/73    


 


9/4/20 01:30  61 27 115/60 (78) 97   


 


9/4/20 01:00    102/69    


 


9/4/20 01:00  59 27 102/69 (80) 99   


 


9/4/20 00:56  63 30     80


 


9/4/20 00:30  54 30 103/58 (73) 99   


 


9/4/20 00:00        100


 


9/4/20 00:00 98.2 55 30 111/49 (69) 98   


 


9/4/20 00:00      Mechanical Ventilator  





      Mechanical Ventilator  


 


9/4/20 00:00    111/49    


 


9/3/20 23:30  57 30 108/54 (72) 99   


 


9/3/20 23:17  67      


 


9/3/20 23:00    102/49    


 


9/3/20 23:00  64 27 102/49 (66) 100   


 


9/3/20 22:54  65 30     80


 


9/3/20 22:30  59 28 109/55 (73) 100   


 


9/3/20 22:00  59 25 108/52 (70) 100   


 


9/3/20 22:00    108/52    


 


9/3/20 21:45  61 27 103/50 (67) 99   


 


9/3/20 21:30  55 30 102/55 (71) 100   


 


9/3/20 21:15  63 28 97/47 (64) 99   


 


9/3/20 21:00    100/55    


 


9/3/20 21:00  61 27 100/55 (70) 99   


 


9/3/20 20:57  68 30     80


 


9/3/20 20:45  58 26 101/51 (68) 100   


 


9/3/20 20:30  66 23 94/53 (67) 99   


 


9/3/20 20:25    118/57    


 


9/3/20 20:00        100


 


9/3/20 20:00    133/65    


 


9/3/20 20:00      Mechanical Ventilator  





      Mechanical Ventilator  


 


9/3/20 20:00 98.2 54 30 133/55 (81) 100   


 


9/3/20 19:33  60      


 


9/3/20 19:30  58 27 113/76 (88) 100   


 


9/3/20 19:14  60 30     80


 


9/3/20 19:00  59 25 117/65 (82) 100   


 


9/3/20 19:00    117/65    


 


9/3/20 16:00  66      


 


9/3/20 12:00  59      








Status:  sedated


Condition:  critical


HEENT:  atraumatic


Neck:  full ROM


Heart:  HR/BP stable, regular


Abdomen:  soft, active bowel sounds


Extremities:  no C/C/E, edema


Micro:





Microbiology








 Date/Time


Source Procedure


Growth Status


 


 


 9/1/20 16:14


Sputum Gram Stain - Final Complete


 


 9/1/20 16:14


Sputum Sputum Culture - Final


NO GROWTH AFTER 48 HOURS Complete








Accucheck:  131





Critical Care - Subjective


ROS Limited/Unobtainable:  Yes


Condition:  critical


EKG Rhythm:  Sinus Rhythm


FI02:  80


Vent Support Breath Rate:  30


Vent Support Mode:  AC


Vent Tidal Volume:  500


Sputum Amount:  Scant


PEEP:  10.0


PIP:  45


Tube Feeding Amount:  30


I&O:











Intake and Output  


 


 9/3/20 9/4/20





 19:00 07:00


 


Intake Total 97.5 ml 1044.3725 ml


 


Output Total 175 ml 550 ml


 


Balance -77.5 ml 494.3725 ml


 


  


 


Free Water  120 ml


 


IV Total 57.5 ml 674.3725 ml


 


Tube Feeding 40 ml 250 ml


 


Output Urine Total 175 ml 550 ml








CXR:


right effusion, ET in good position


ET-Tube:  7.5


ET Position:  22


Labs:





Laboratory Tests








Test


  9/4/20


04:20 9/4/20


07:30


 


White Blood Count


  9.1 K/UL


(4.8-10.8) 


 


 


Red Blood Count


  2.44 M/UL


(4.20-5.40)  L 


 


 


Hemoglobin


  8.3 G/DL


(12.0-16.0)  L 


 


 


Hematocrit


  24.6 %


(37.0-47.0)  L 


 


 


Mean Corpuscular Volume


  101 FL (80-99)


H 


 


 


Mean Corpuscular Hemoglobin


  33.9 PG


(27.0-31.0)  H 


 


 


Mean Corpuscular Hemoglobin


Concent 33.7 G/DL


(32.0-36.0) 


 


 


Red Cell Distribution Width


  12.7 %


(11.6-14.8) 


 


 


Platelet Count


  53 K/UL


(150-450)  L 


 


 


Mean Platelet Volume


  8.9 FL


(6.5-10.1) 


 


 


Neutrophils (%) (Auto)


  % (45.0-75.0)


  


 


 


Lymphocytes (%) (Auto)


  % (20.0-45.0)


  


 


 


Monocytes (%) (Auto)  % (1.0-10.0)   


 


Eosinophils (%) (Auto)  % (0.0-3.0)   


 


Basophils (%) (Auto)  % (0.0-2.0)   


 


Sodium Level


  147 MMOL/L


(136-145)  H 


 


 


Potassium Level


  3.5 MMOL/L


(3.5-5.1) 


 


 


Chloride Level


  116 MMOL/L


()  H 


 


 


Carbon Dioxide Level


  18 MMOL/L


(21-32)  L 


 


 


Anion Gap


  13 mmol/L


(5-15) 


 


 


Blood Urea Nitrogen


  43 mg/dL


(7-18)  H 


 


 


Creatinine


  1.6 MG/DL


(0.55-1.30)  H 


 


 


Estimat Glomerular Filtration


Rate 33.1 mL/min


(>60) 


 


 


Glucose Level


  99 MG/DL


() 


 


 


Calcium Level


  8.4 MG/DL


(8.5-10.1)  L 


 


 


Phosphorus Level


  3.2 MG/DL


(2.5-4.9) 


 


 


Magnesium Level


  2.2 MG/DL


(1.8-2.4) 


 


 


Total Bilirubin


  0.5 MG/DL


(0.2-1.0) 


 


 


Aspartate Amino Transf


(AST/SGOT) 28 U/L (15-37)


  


 


 


Alanine Aminotransferase


(ALT/SGPT) 14 U/L (12-78)


  


 


 


Alkaline Phosphatase


  48 U/L


() 


 


 


Total Protein


  4.4 G/DL


(6.4-8.2)  L 


 


 


Albumin


  1.4 G/DL


(3.4-5.0)  L 


 


 


Globulin 3.0 g/dL   


 


Albumin/Globulin Ratio


  0.5 (1.0-2.7)


L 


 


 


Cryptococcus Antigen Pending   


 


Arterial Blood pH


  


  7.477


(7.350-7.450)


 


Arterial Blood Partial


Pressure CO2 


  20.4 mmHg


(35.0-45.0)  *L


 


Arterial Blood Partial


Pressure O2 


  82.0 mmHg


(75.0-100.0)


 


Arterial Blood HCO3


  


  14.7 mmol/L


(22.0-26.0)  *L


 


Arterial Blood Oxygen


Saturation 


  95.1 %


()


 


Arterial Blood Base Excess  -7.5 (-2-2)  L


 


Cole Test  Positive  

















Chano Cherry MD Sep 4, 2020 11:13

## 2020-09-04 NOTE — NUR
NURSE NOTES:

Elected to hold Midodrine due to current pt BP/HR and potential for changing rate on 
Dopamine. Pill already crushed, disposed of per protocol with charge RN.

## 2020-09-04 NOTE — NUR
RESPIRATORY NOTES:

Received pt on vent settings 30/ 500/ 100%/ +10. pt is 7.5/ 22 at the lips secured by anchor 
fast.. pt saturation 100% on 100 Fio2. pt was tachy breathing 40's- 50's, fighting the vent. 
Rn aware. sx small amount of clear secretions. alarms are on and audible, vent plugged into 
red outlet, bmv at bedside. will continue to monitor throughout the night.

## 2020-09-04 NOTE — DIAGNOSTIC IMAGING REPORT
Indication: Shortness of breath

 

Technique: One view of the chest

 

Comparison: 9/3/2020

 

Findings: Patient is rotated to the left. Right-sided pleural effusion appears

slightly improved. Left-sided pleural effusion likewise appears somewhat improved.

Bilateral interstitial and airspace edema appear unchanged. Stable satisfactory

position of endotracheal tube

 

Impression: Apparent improvement in bilateral pleural effusions, over one day

 

Stable bilateral parenchymal disease

## 2020-09-04 NOTE — NUR
NURSE NOTES:

Patient remains on Dopamine drip , increase rate at 3mcg/hr HR at 52 bpm, will continue to 
monitor.No significant change in condition at this time.Will continue close monitoring.Call 
light within easy reach.

## 2020-09-04 NOTE — NUR
NURSE NOTES:

ADLs done,turned and repositioned. Mouth care performed. Anxiety has slightly decreased, RR 
28 after Ativan.Will continue close monitoring.Call light within easy reach.

## 2020-09-04 NOTE — NUR
NURSE HAND-OFF REPORT: 



Latest Vital Signs: Temperature 98.1 , Pulse 54 , B/P 113 /52 , Respiratory Rate 30 , O2 SAT 
99 , Mechanical Ventilator, O2 Flow Rate 15.0 .  

Vital Sign Comment: -



EKG Rhythm: Sinus Bradycardia

Rhythm change?: N 

MD Notified?: -

MD Response: 



Latest Momin Fall Score: 50  

Fall Risk: High Risk 

Safety Measures: Call light Within Reach, Bed Alarm Zone 2, Side Rails Side Rails x3, Bed 
position Low and Locked.

Fall Precautions: 

Door Sign

Patient Fall Education





Report given to LAYTON Armstrong. Endorsed plan of care.

## 2020-09-04 NOTE — NEPHROLOGY PROGRESS NOTE
Assessment/Plan


Problem List:  


(1) DAVID (acute kidney injury)


(2) Respiratory failure requiring intubation


(3) Down's syndrome


(4) Seizure disorder


(5) Hypothyroidism


Assessment





Acute renal failure, likely due to hypotension


Acute respiratory distress, hypoxia


Seizure disorder


Hypothyroidism


Down syndrome


Full code











Fluid challenge with IV fluids and albumin


Midodrine for BP above 100 systolic


Check TSH level


Check


Correct level


Monitor renal parameters


Urine studies


Per orders


Plan


September 4: Status quo.  Labs reviewed.  Renal parameters stable.  Serum 

creatinine down to 1.6.  Medication list reviewed.  Continues to be on 

midodrine.  Continue per consultants.


September 3: Status quo.  Labs reviewed.  Electrolytes adjusted.  Serum 

creatinine down to 1.8.  Continue per consultants.


September 2: Status quo.  Labs reviewed.  Phosphorus supplement IV given.  

Serum creatinine 2.  Continue per consultants.


September 1: Requires less pressors.  Albumin bolus given.  1 dose of Lasix IV 

ordered as the patient severely edematous.  Patient serum albumin is very low.  

Continue per consultants.


August 31: Continues to be intubated.  Labs reviewed.  Serum creatinine 1.9 

unchanged.  Blood pressure more stable.  Off 1 of the pressors.  Continue to 

monitor renal parameters.  Continue per consultants.  Patient now on 

hydrocortisone 100 mg every 8 hours.  Will decrease IV fluid.  Normal saline 

down to 50 cc an hour.


August 30: Intubated.  Labs reviewed.  Creatinine 1.9 unchanged.  Continue same 

treatment plan.  Per consultants.  Overall poor prognosis since the patient 

remains on pressors and her pulmonary status is worsening.


August 29: Remains intubated.  Labs reviewed.  Creatinine 1.9.  Blood pressure 

systolic 90s.  Continue per consultants.


August 28: Remains intubated.  Labs reviewed.  Serum creatinine lower to 2.  

Vancomycin level lower.  Remains hypotensive on pressors.  Will increase 

midodrine to 10 mg every 8 hours.  Continue per consultants.  Continue to 

monitor renal parameters.


August 27: Patient now in ICU.  Intubated.  On pressors.  Labs reviewed.  Will 

increase midodrine.  Aim to keep blood pressure over 100 systolic.  Will give 

albumin bolus.  Will check vancomycin level which was elevated when checked 

previously on August 24.  Will monitor renal parameters.  Continue per 

consultants.





Subjective


ROS Limited/Unobtainable:  Yes





Objective


Objective





Last 24 Hour Vital Signs








  Date Time  Temp Pulse Resp B/P (MAP) Pulse Ox O2 Delivery O2 Flow Rate FiO2


 


9/4/20 11:00  50 30 113/52 (72) 100   


 


9/4/20 10:45  54 30 98/50 (66) 100   


 


9/4/20 10:30  58 29 109/53 (71) 99   


 


9/4/20 10:15  55 30 108/52 (70) 100   


 


9/4/20 10:00    98/50    


 


9/4/20 10:00  57 27 96/50 (65) 99   


 


9/4/20 09:45  61 29 107/68 (81) 99   


 


9/4/20 09:30  55 27 126/57 (80) 97   


 


9/4/20 09:15  53 30 111/51 (71) 100   


 


9/4/20 09:00  59 28 109/53 (71) 100   


 


9/4/20 09:00    107/68    


 


9/4/20 08:57  54 30     80


 


9/4/20 08:45  61 25 116/47 (70) 99   


 


9/4/20 08:43    135/58    


 


9/4/20 08:30  53 30 135/58 (83) 99   


 


9/4/20 08:15 98.4 53 30 140/65 (90) 99   


 


9/4/20 08:00  51      


 


9/4/20 08:00      Mechanical Ventilator  





      Mechanical Ventilator  


 


9/4/20 08:00  53 29 122/62 (82) 99   


 


9/4/20 08:00        100


 


9/4/20 07:45    140/78    


 


9/4/20 07:30  60 24 108/51 (70) 98   


 


9/4/20 07:29  58 30     80


 


9/4/20 07:00    108/71    


 


9/4/20 07:00  53 30 108/51 (70) 100   


 


9/4/20 06:30  54 30 113/52 (72) 99   


 


9/4/20 06:15  55 30 121/48 (72) 99   


 


9/4/20 06:00    122/57    


 


9/4/20 06:00  56 30 122/57 (78) 99   


 


9/4/20 05:47  62 30 72/47 (55) 99   


 


9/4/20 05:45    72/47    


 


9/4/20 05:30  61 26 109/44 (65) 99   


 


9/4/20 05:30    109/44    


 


9/4/20 05:05  70 30     80


 


9/4/20 05:00    112/59    


 


9/4/20 05:00  60 25 112/59 (76) 99   


 


9/4/20 04:30  62 28 103/58 (73) 99   


 


9/4/20 04:00 98.1 56 29 105/57 (73) 99   


 


9/4/20 04:00    105/57    


 


9/4/20 04:00        100


 


9/4/20 04:00      Mechanical Ventilator  





      Mechanical Ventilator  


 


9/4/20 03:30  59 29 102/53 (69) 100   


 


9/4/20 03:14  68 32     80


 


9/4/20 03:11  61      


 


9/4/20 03:00  63 25 92/53 (66) 99   


 


9/4/20 03:00    92/53    


 


9/4/20 02:30  59 28 102/56 (71) 99   


 


9/4/20 02:00  72 30 125/73 (90) 98   


 


9/4/20 02:00    125/73    


 


9/4/20 01:30  61 27 115/60 (78) 97   


 


9/4/20 01:00    102/69    


 


9/4/20 01:00  59 27 102/69 (80) 99   


 


9/4/20 00:56  63 30     80


 


9/4/20 00:30  54 30 103/58 (73) 99   


 


9/4/20 00:00        100


 


9/4/20 00:00 98.2 55 30 111/49 (69) 98   


 


9/4/20 00:00      Mechanical Ventilator  





      Mechanical Ventilator  


 


9/4/20 00:00    111/49    


 


9/3/20 23:30  57 30 108/54 (72) 99   


 


9/3/20 23:17  67      


 


9/3/20 23:00    102/49    


 


9/3/20 23:00  64 27 102/49 (66) 100   


 


9/3/20 22:54  65 30     80


 


9/3/20 22:30  59 28 109/55 (73) 100   


 


9/3/20 22:00  59 25 108/52 (70) 100   


 


9/3/20 22:00    108/52    


 


9/3/20 21:45  61 27 103/50 (67) 99   


 


9/3/20 21:30  55 30 102/55 (71) 100   


 


9/3/20 21:15  63 28 97/47 (64) 99   


 


9/3/20 21:00    100/55    


 


9/3/20 21:00  61 27 100/55 (70) 99   


 


9/3/20 20:57  68 30     80


 


9/3/20 20:45  58 26 101/51 (68) 100   


 


9/3/20 20:30  66 23 94/53 (67) 99   


 


9/3/20 20:25    118/57    


 


9/3/20 20:00        100


 


9/3/20 20:00    133/65    


 


9/3/20 20:00      Mechanical Ventilator  





      Mechanical Ventilator  


 


9/3/20 20:00 98.2 54 30 133/55 (81) 100   


 


9/3/20 19:33  60      


 


9/3/20 19:30  58 27 113/76 (88) 100   


 


9/3/20 19:14  60 30     80


 


9/3/20 19:00  59 25 117/65 (82) 100   


 


9/3/20 19:00    117/65    


 


9/3/20 16:00  66      


 


9/3/20 12:00  59      

















Intake and Output  


 


 9/3/20 9/4/20





 19:00 07:00


 


Intake Total 97.5 ml 1044.3725 ml


 


Output Total 175 ml 550 ml


 


Balance -77.5 ml 494.3725 ml


 


  


 


Free Water  120 ml


 


IV Total 57.5 ml 674.3725 ml


 


Tube Feeding 40 ml 250 ml


 


Output Urine Total 175 ml 550 ml








Laboratory Tests


9/4/20 04:20: 


White Blood Count 9.1, Red Blood Count 2.44L, Hemoglobin 8.3L, Hematocrit 24.6L

, Mean Corpuscular Volume 101H, Mean Corpuscular Hemoglobin 33.9H, Mean 

Corpuscular Hemoglobin Concent 33.7, Red Cell Distribution Width 12.7, Platelet 

Count 53L, Mean Platelet Volume 8.9, Neutrophils (%) (Auto) , Lymphocytes (%) (

Auto) , Monocytes (%) (Auto) , Eosinophils (%) (Auto) , Basophils (%) (Auto) , 

Sodium Level 147H, Potassium Level 3.5, Chloride Level 116H, Carbon Dioxide 

Level 18L, Anion Gap 13, Blood Urea Nitrogen 43H, Creatinine 1.6H, Estimat 

Glomerular Filtration Rate 33.1, Glucose Level 99, Calcium Level 8.4L, 

Phosphorus Level 3.2, Magnesium Level 2.2, Total Bilirubin 0.5, Aspartate Amino 

Transf (AST/SGOT) 28, Alanine Aminotransferase (ALT/SGPT) 14, Alkaline 

Phosphatase 48, Total Protein 4.4L, Albumin 1.4L, Globulin 3.0, Albumin/

Globulin Ratio 0.5L, Cryptococcus Antigen [Pending]


9/4/20 07:30: 


Arterial Blood pH 7.477H, Arterial Blood Partial Pressure CO2 20.4*L, Arterial 

Blood Partial Pressure O2 82.0, Arterial Blood HCO3 14.7*L, Arterial Blood 

Oxygen Saturation 95.1, Arterial Blood Base Excess -7.5L, Cole Test Positive


Height (Feet):  5


Height (Inches):  3.00


Weight (Pounds):  172


General Appearance:  no apparent distress


EENT:  other - Continues to be vented


Cardiovascular:  bradycardia - Rate 50s


Respiratory/Chest:  decreased breath sounds


Abdomen:  distended











Lam Nino MD Sep 4, 2020 11:14

## 2020-09-05 VITALS — DIASTOLIC BLOOD PRESSURE: 42 MMHG | SYSTOLIC BLOOD PRESSURE: 92 MMHG

## 2020-09-05 VITALS — SYSTOLIC BLOOD PRESSURE: 99 MMHG | DIASTOLIC BLOOD PRESSURE: 39 MMHG

## 2020-09-05 VITALS — DIASTOLIC BLOOD PRESSURE: 47 MMHG | SYSTOLIC BLOOD PRESSURE: 95 MMHG

## 2020-09-05 VITALS — DIASTOLIC BLOOD PRESSURE: 54 MMHG | SYSTOLIC BLOOD PRESSURE: 108 MMHG

## 2020-09-05 VITALS — SYSTOLIC BLOOD PRESSURE: 87 MMHG | DIASTOLIC BLOOD PRESSURE: 57 MMHG

## 2020-09-05 VITALS — DIASTOLIC BLOOD PRESSURE: 38 MMHG | SYSTOLIC BLOOD PRESSURE: 99 MMHG

## 2020-09-05 VITALS — DIASTOLIC BLOOD PRESSURE: 44 MMHG | SYSTOLIC BLOOD PRESSURE: 98 MMHG

## 2020-09-05 VITALS — DIASTOLIC BLOOD PRESSURE: 50 MMHG | SYSTOLIC BLOOD PRESSURE: 100 MMHG

## 2020-09-05 VITALS — DIASTOLIC BLOOD PRESSURE: 49 MMHG | SYSTOLIC BLOOD PRESSURE: 97 MMHG

## 2020-09-05 VITALS — SYSTOLIC BLOOD PRESSURE: 98 MMHG | DIASTOLIC BLOOD PRESSURE: 50 MMHG

## 2020-09-05 VITALS — SYSTOLIC BLOOD PRESSURE: 111 MMHG | DIASTOLIC BLOOD PRESSURE: 49 MMHG

## 2020-09-05 VITALS — SYSTOLIC BLOOD PRESSURE: 114 MMHG | DIASTOLIC BLOOD PRESSURE: 58 MMHG

## 2020-09-05 VITALS — SYSTOLIC BLOOD PRESSURE: 108 MMHG | DIASTOLIC BLOOD PRESSURE: 54 MMHG

## 2020-09-05 VITALS — SYSTOLIC BLOOD PRESSURE: 101 MMHG | DIASTOLIC BLOOD PRESSURE: 48 MMHG

## 2020-09-05 VITALS — SYSTOLIC BLOOD PRESSURE: 91 MMHG | DIASTOLIC BLOOD PRESSURE: 48 MMHG

## 2020-09-05 VITALS — DIASTOLIC BLOOD PRESSURE: 45 MMHG | SYSTOLIC BLOOD PRESSURE: 98 MMHG

## 2020-09-05 VITALS — SYSTOLIC BLOOD PRESSURE: 96 MMHG | DIASTOLIC BLOOD PRESSURE: 41 MMHG

## 2020-09-05 VITALS — DIASTOLIC BLOOD PRESSURE: 48 MMHG | SYSTOLIC BLOOD PRESSURE: 98 MMHG

## 2020-09-05 VITALS — SYSTOLIC BLOOD PRESSURE: 101 MMHG | DIASTOLIC BLOOD PRESSURE: 51 MMHG

## 2020-09-05 VITALS — SYSTOLIC BLOOD PRESSURE: 91 MMHG | DIASTOLIC BLOOD PRESSURE: 47 MMHG

## 2020-09-05 VITALS — SYSTOLIC BLOOD PRESSURE: 93 MMHG | DIASTOLIC BLOOD PRESSURE: 48 MMHG

## 2020-09-05 VITALS — SYSTOLIC BLOOD PRESSURE: 93 MMHG | DIASTOLIC BLOOD PRESSURE: 40 MMHG

## 2020-09-05 VITALS — SYSTOLIC BLOOD PRESSURE: 89 MMHG | DIASTOLIC BLOOD PRESSURE: 45 MMHG

## 2020-09-05 VITALS — DIASTOLIC BLOOD PRESSURE: 45 MMHG | SYSTOLIC BLOOD PRESSURE: 101 MMHG

## 2020-09-05 VITALS — SYSTOLIC BLOOD PRESSURE: 95 MMHG | DIASTOLIC BLOOD PRESSURE: 45 MMHG

## 2020-09-05 VITALS — SYSTOLIC BLOOD PRESSURE: 103 MMHG | DIASTOLIC BLOOD PRESSURE: 50 MMHG

## 2020-09-05 VITALS — SYSTOLIC BLOOD PRESSURE: 92 MMHG | DIASTOLIC BLOOD PRESSURE: 50 MMHG

## 2020-09-05 VITALS — SYSTOLIC BLOOD PRESSURE: 93 MMHG | DIASTOLIC BLOOD PRESSURE: 53 MMHG

## 2020-09-05 VITALS — DIASTOLIC BLOOD PRESSURE: 57 MMHG | SYSTOLIC BLOOD PRESSURE: 113 MMHG

## 2020-09-05 VITALS — DIASTOLIC BLOOD PRESSURE: 53 MMHG | SYSTOLIC BLOOD PRESSURE: 109 MMHG

## 2020-09-05 VITALS — DIASTOLIC BLOOD PRESSURE: 55 MMHG | SYSTOLIC BLOOD PRESSURE: 113 MMHG

## 2020-09-05 VITALS — SYSTOLIC BLOOD PRESSURE: 119 MMHG | DIASTOLIC BLOOD PRESSURE: 60 MMHG

## 2020-09-05 VITALS — DIASTOLIC BLOOD PRESSURE: 48 MMHG | SYSTOLIC BLOOD PRESSURE: 95 MMHG

## 2020-09-05 VITALS — SYSTOLIC BLOOD PRESSURE: 93 MMHG | DIASTOLIC BLOOD PRESSURE: 44 MMHG

## 2020-09-05 VITALS — SYSTOLIC BLOOD PRESSURE: 98 MMHG | DIASTOLIC BLOOD PRESSURE: 51 MMHG

## 2020-09-05 VITALS — SYSTOLIC BLOOD PRESSURE: 104 MMHG | DIASTOLIC BLOOD PRESSURE: 51 MMHG

## 2020-09-05 VITALS — SYSTOLIC BLOOD PRESSURE: 84 MMHG | DIASTOLIC BLOOD PRESSURE: 44 MMHG

## 2020-09-05 VITALS — DIASTOLIC BLOOD PRESSURE: 45 MMHG | SYSTOLIC BLOOD PRESSURE: 94 MMHG

## 2020-09-05 VITALS — SYSTOLIC BLOOD PRESSURE: 113 MMHG | DIASTOLIC BLOOD PRESSURE: 54 MMHG

## 2020-09-05 VITALS — DIASTOLIC BLOOD PRESSURE: 49 MMHG | SYSTOLIC BLOOD PRESSURE: 89 MMHG

## 2020-09-05 VITALS — SYSTOLIC BLOOD PRESSURE: 102 MMHG | DIASTOLIC BLOOD PRESSURE: 52 MMHG

## 2020-09-05 VITALS — SYSTOLIC BLOOD PRESSURE: 107 MMHG | DIASTOLIC BLOOD PRESSURE: 50 MMHG

## 2020-09-05 VITALS — SYSTOLIC BLOOD PRESSURE: 103 MMHG | DIASTOLIC BLOOD PRESSURE: 55 MMHG

## 2020-09-05 VITALS — DIASTOLIC BLOOD PRESSURE: 56 MMHG | SYSTOLIC BLOOD PRESSURE: 110 MMHG

## 2020-09-05 VITALS — SYSTOLIC BLOOD PRESSURE: 106 MMHG | DIASTOLIC BLOOD PRESSURE: 51 MMHG

## 2020-09-05 LAB
ADD MANUAL DIFF: NO
ALBUMIN SERPL-MCNC: 1.5 G/DL (ref 3.4–5)
ALBUMIN/GLOB SERPL: 0.4 {RATIO} (ref 1–2.7)
ALP SERPL-CCNC: 53 U/L (ref 46–116)
ALT SERPL-CCNC: 16 U/L (ref 12–78)
ANION GAP SERPL CALC-SCNC: 13 MMOL/L (ref 5–15)
AST SERPL-CCNC: 21 U/L (ref 15–37)
BILIRUB SERPL-MCNC: 1 MG/DL (ref 0.2–1)
BUN SERPL-MCNC: 49 MG/DL (ref 7–18)
CALCIUM SERPL-MCNC: 8.3 MG/DL (ref 8.5–10.1)
CHLORIDE SERPL-SCNC: 115 MMOL/L (ref 98–107)
CO2 SERPL-SCNC: 19 MMOL/L (ref 21–32)
CREAT SERPL-MCNC: 1.6 MG/DL (ref 0.55–1.3)
ERYTHROCYTE [DISTWIDTH] IN BLOOD BY AUTOMATED COUNT: 13.6 % (ref 11.6–14.8)
GLOBULIN SER-MCNC: 3.4 G/DL
HCT VFR BLD CALC: 25.9 % (ref 37–47)
HGB BLD-MCNC: 8.9 G/DL (ref 12–16)
MCV RBC AUTO: 99 FL (ref 80–99)
PHOSPHATE SERPL-MCNC: 3.3 MG/DL (ref 2.5–4.9)
PLATELET # BLD: 57 K/UL (ref 150–450)
POTASSIUM SERPL-SCNC: 2.7 MMOL/L (ref 3.5–5.1)
RBC # BLD AUTO: 2.61 M/UL (ref 4.2–5.4)
SODIUM SERPL-SCNC: 148 MMOL/L (ref 136–145)
WBC # BLD AUTO: 11.3 K/UL (ref 4.8–10.8)

## 2020-09-05 RX ADMIN — HYDROCORTISONE SODIUM SUCCINATE SCH MG: 100 INJECTION, POWDER, FOR SOLUTION INTRAMUSCULAR; INTRAVENOUS at 05:01

## 2020-09-05 RX ADMIN — CHLORHEXIDINE GLUCONATE SCH APPLIC: 213 SOLUTION TOPICAL at 20:07

## 2020-09-05 RX ADMIN — MIDODRINE HYDROCHLORIDE SCH MG: 10 TABLET ORAL at 21:02

## 2020-09-05 RX ADMIN — MIDODRINE HYDROCHLORIDE SCH MG: 10 TABLET ORAL at 05:01

## 2020-09-05 RX ADMIN — HYDROCORTISONE SODIUM SUCCINATE SCH MG: 100 INJECTION, POWDER, FOR SOLUTION INTRAMUSCULAR; INTRAVENOUS at 21:02

## 2020-09-05 RX ADMIN — VALPROIC ACID SCH MG: 250 SOLUTION ORAL at 14:07

## 2020-09-05 RX ADMIN — DOPAMINE HYDROCHLORIDE IN DEXTROSE SCH MLS/HR: 1.6 INJECTION, SOLUTION INTRAVENOUS at 14:00

## 2020-09-05 RX ADMIN — MIDODRINE HYDROCHLORIDE SCH MG: 10 TABLET ORAL at 14:07

## 2020-09-05 RX ADMIN — HYDROCORTISONE SODIUM SUCCINATE SCH MG: 100 INJECTION, POWDER, FOR SOLUTION INTRAMUSCULAR; INTRAVENOUS at 14:07

## 2020-09-05 RX ADMIN — VALPROIC ACID SCH MG: 250 SOLUTION ORAL at 05:01

## 2020-09-05 RX ADMIN — MEROPENEM SCH MLS/HR: 1 INJECTION INTRAVENOUS at 03:00

## 2020-09-05 RX ADMIN — DOPAMINE HYDROCHLORIDE IN DEXTROSE SCH MLS/HR: 1.6 INJECTION, SOLUTION INTRAVENOUS at 11:15

## 2020-09-05 RX ADMIN — VALPROIC ACID SCH MG: 250 SOLUTION ORAL at 21:02

## 2020-09-05 RX ADMIN — MEROPENEM SCH MLS/HR: 1 INJECTION INTRAVENOUS at 16:18

## 2020-09-05 RX ADMIN — PANTOPRAZOLE SODIUM SCH MG: 40 INJECTION, POWDER, FOR SOLUTION INTRAVENOUS at 09:01

## 2020-09-05 NOTE — INTERNAL MED PROGRESS NOTE
Subjective


Physician Name


Tyson Hung


Attending Physician


Chano Cherry MD





Current Medications








 Medications


  (Trade)  Dose


 Ordered  Sig/Didi


 Route


 PRN Reason  Start Time


 Stop Time Status Last Admin


Dose Admin


 


 Acetaminophen


  (Tylenol)  650 mg  Q4H  PRN


 GT


 FEVER  8/26/20 11:45


 9/25/20 11:44  8/29/20 23:15


 


 


 Chlorhexidine


 Gluconate


  (Beatrice-Hex 2%)  1 applic  DAILY@2000


 TOPIC


   8/31/20 20:00


 11/29/20 19:59  9/5/20 20:07


 


 


 Clotrimazole


  (Lotrimin)  1 applic  Q12HR


 TOPIC


   8/30/20 13:00


 11/28/20 12:59  9/5/20 20:07


 


 


 Dextrose


  (Dextrose 50%)  25 ml  Q30M  PRN


 IV


 Hypoglycemia  8/26/20 11:30


 11/20/20 11:29   


 


 


 Dextrose


  (Dextrose 50%)  50 ml  Q30M  PRN


 IV


 Hypoglycemia  8/26/20 11:30


 11/20/20 11:29   


 


 


 Dopamine HCl/


 Dextrose  250 ml @ 0


 mls/hr  Q24H


 IV


   9/4/20 11:15


 12/3/20 11:14  9/5/20 14:00


 


 


 Hydrocortisone


  (Solu-CORTEF)  100 mg  EVERY 8  HOURS


 IV


   8/30/20 14:00


 11/28/20 13:59  9/5/20 21:02


 


 


 Levothyroxine


 Sodium


  (Synthroid)  75 mcg  DAILY


 GT


   8/27/20 09:00


 9/22/20 08:59  9/5/20 09:01


 


 


 Lorazepam


  (Ativan 2mg/ml


 1ml)  2 mg  Q2H  PRN


 IV


 For Anxiety  9/4/20 17:30


 9/11/20 17:29  9/4/20 17:37


 


 


 Meropenem 1 gm/


 Sodium Chloride  55 ml @ 


 110 mls/hr  Q12H


 IVPB


   8/26/20 16:00


 9/10/20 15:59  9/5/20 16:18


 


 


 Midodrine


  (Pro-Amatine)  10 mg  Q8HR


 GT


   8/28/20 14:00


 11/26/20 13:59  9/5/20 21:02


 


 


 Norepinephrine


 Bitartrate 16 mg/


 Dextrose  500 ml @ 0


 mls/hr  Q24H


 IV


   8/31/20 07:45


 9/29/20 12:59  9/4/20 08:43


 


 


 Ondansetron HCl


  (Zofran)  4 mg  Q6H  PRN


 IVP


 Nausea & Vomiting  8/26/20 12:00


 9/21/20 11:59   


 


 


 Pantoprazole


  (Protonix)  40 mg  DAILY


 IV


   8/27/20 09:00


 9/22/20 08:59  9/5/20 09:01


 


 


 Phenylephrine HCl


 50 mg/Dextrose  250 ml @ 0


 mls/hr  Q24H  PRN


 IV


 For hypotension  8/29/20 17:45


 9/28/20 17:44  8/31/20 01:49


 


 


 Polyethylene


 Glycol


  (Miralax)  17 gm  DAILYPRN  PRN


 GT


 Constipation  8/26/20 12:00


 9/21/20 11:59   


 


 


 Valproic Acid


  (Depakene)  500 mg  EVERY 8  HOURS


 GT


   8/26/20 14:00


 9/21/20 21:59  9/5/20 21:02


 








Allergies:  


Coded Allergies:  


     No Known Allergies (Unverified , 1/8/19)


Subjective


in ICU, remained intubated on the vent, open eyes, unable to follow command. WBC

: 11.3, K: 2.7. Renal function stable





Objective





Last Vital Signs








  Date Time  Temp Pulse Resp B/P (MAP) Pulse Ox O2 Delivery O2 Flow Rate FiO2


 


9/5/20 23:00    98/50    


 


9/5/20 23:00  56 25  100   


 


9/5/20 20:45        100


 


9/5/20 20:00      Mechanical Ventilator  





      Mechanical Ventilator  


 


9/5/20 20:00 98.4       











Laboratory Tests








Test


  9/5/20


04:00 9/5/20


07:30


 


White Blood Count


  11.3 K/UL


(4.8-10.8)  H 


 


 


Red Blood Count


  2.61 M/UL


(4.20-5.40)  L 


 


 


Hemoglobin


  8.9 G/DL


(12.0-16.0)  L 


 


 


Hematocrit


  25.9 %


(37.0-47.0)  L 


 


 


Mean Corpuscular Volume 99 FL (80-99)   


 


Mean Corpuscular Hemoglobin


  33.9 PG


(27.0-31.0)  H 


 


 


Mean Corpuscular Hemoglobin


Concent 34.3 G/DL


(32.0-36.0) 


 


 


Red Cell Distribution Width


  13.6 %


(11.6-14.8) 


 


 


Platelet Count


  57 K/UL


(150-450)  L 


 


 


Mean Platelet Volume


  7.7 FL


(6.5-10.1) 


 


 


Neutrophils (%) (Auto)


  % (45.0-75.0)


  


 


 


Lymphocytes (%) (Auto)


  % (20.0-45.0)


  


 


 


Monocytes (%) (Auto)  % (1.0-10.0)   


 


Eosinophils (%) (Auto)  % (0.0-3.0)   


 


Basophils (%) (Auto)  % (0.0-2.0)   


 


Differential Total Cells


Counted 100  


  


 


 


Neutrophils % (Manual) 81 % (45-75)  H 


 


Lymphocytes % (Manual) 10 % (20-45)  L 


 


Monocytes % (Manual) 7 % (1-10)   


 


Eosinophils % (Manual) 0 % (0-3)   


 


Basophils % (Manual) 0 % (0-2)   


 


Band Neutrophils 2 % (0-8)   


 


Platelet Estimate Decreased  L 


 


Platelet Morphology Normal   


 


Hypochromasia 1+   


 


Sodium Level


  148 MMOL/L


(136-145)  H 


 


 


Potassium Level


  2.7 MMOL/L


(3.5-5.1)  *L 


 


 


Chloride Level


  115 MMOL/L


()  H 


 


 


Carbon Dioxide Level


  19 MMOL/L


(21-32)  L 


 


 


Anion Gap


  13 mmol/L


(5-15) 


 


 


Blood Urea Nitrogen


  49 mg/dL


(7-18)  H 


 


 


Creatinine


  1.6 MG/DL


(0.55-1.30)  H 


 


 


Estimat Glomerular Filtration


Rate 33.1 mL/min


(>60) 


 


 


Glucose Level


  128 MG/DL


()  H 


 


 


Calcium Level


  8.3 MG/DL


(8.5-10.1)  L 


 


 


Phosphorus Level


  3.3 MG/DL


(2.5-4.9) 


 


 


Magnesium Level


  2.2 MG/DL


(1.8-2.4) 


 


 


Total Bilirubin


  1.0 MG/DL


(0.2-1.0) 


 


 


Aspartate Amino Transf


(AST/SGOT) 21 U/L (15-37)


  


 


 


Alanine Aminotransferase


(ALT/SGPT) 16 U/L (12-78)


  


 


 


Alkaline Phosphatase


  53 U/L


() 


 


 


C-Reactive Protein,


Quantitative 2.1 mg/dL


(0.00-0.90)  H 


 


 


Total Protein


  4.9 G/DL


(6.4-8.2)  L 


 


 


Albumin


  1.5 G/DL


(3.4-5.0)  L 


 


 


Globulin 3.4 g/dL   


 


Albumin/Globulin Ratio


  0.4 (1.0-2.7)


L 


 


 


Thyroid Stimulating Hormone


(TSH) 0.332 uiU/mL


(0.358-3.740) 


 


 


Valproic Acid (Depakene) Level


  28 MCG/ML


()  L 


 


 


Arterial Blood pH


  


  7.482


(7.350-7.450)


 


Arterial Blood Partial


Pressure CO2 


  21.8 mmHg


(35.0-45.0)  *L


 


Arterial Blood Partial


Pressure O2 


  146.5 mmHg


(75.0-100.0)  H


 


Arterial Blood HCO3


  


  15.9 mmol/L


(22.0-26.0)  *L


 


Arterial Blood Oxygen


Saturation 


  98.0 %


()


 


Arterial Blood Base Excess  -6.3 (-2-2)  L


 


Cole Test  Positive  

















Intake and Output  


 


 9/4/20 9/5/20





 19:00 07:00


 


Intake Total 771.66530 ml 777.96435 ml


 


Output Total 1060 ml 940 ml


 


Balance -288.74709 ml -162.12965 ml


 


  


 


Free Water 90 ml 90 ml


 


IV Total 281.24453 ml 177.31100 ml


 


Tube Feeding 400 ml 510 ml


 


Output Urine Total 1060 ml 940 ml


 


# Bowel Movements 1 1








Objective


General: remain intubated, unable to follow command, open eyes.


HEENT: NCAT, sclera anicteric, PERRL, ET Tube.


Neck: Supple, no significant jugular venous distention, 


Lungs: mechanical breath sounds decreased air at the bases,  no Wheeze or Rales.


Heart: Regular rate and rhythm, normal S1/S2, no murmurs/gallops


Abdomen: soft, not tender, not distended. + bowel sounds, Morbid Obesity, PEG 

site intact.


Extremities: No Cyanosis , clubbing,  upper extremities less edema. left upper 

extremity PICC line.


Neuro: limited secondary to patient status, unable to follow command, bilateral 

upper and lower extremities contracted.





Assessment/Plan


Assessment/Plan


Problem List:  


(1) HCAP (healthcare-associated pneumonia)


(2) Sepsis


(3) Down's syndrome


(4) Dysphagia S/P PEG


(5) Seizure disorder


(6) Hypothyroidism


(7) Acute Hypoxemic respiratory failure


(8) Persistent, high grade bacteremia-  r/o endocarditis


(9) DAVID





Plan: 


Tolerated tube feeding @ 50 cc/hr


Follow-up with laboratory and cultures


on Dopamine drip @ 2 Mcq


Hydrocortisone 100 mg IV every 8


Cont Daptomycin #12(abx d #14/14) for GPC bacteremia in view of DAVID.


Cont Meropenem#11/10-14 (abx d #14) given resp decompensation


chest x-ray: Improving.











Tyson Hung MD Sep 5, 2020 23:39

## 2020-09-05 NOTE — NUR
NURSE NOTES:

Bedside assessment performed, assessed pt with a FLACC scale of 0 noted. VS obtained and 
remain stable at this time. Dopamine continues to infuse at 2 mcg/kg/min with no adverse 
effects noted. SBP remains between  at this time. Will continue to monitor and titrate 
accordingly. Respiratory status remains stable with no s/sx of acute distress noted. Pt 
tolerating current vent settings well. Neuro assessment remains consistent with previous 
physical assessment, no s/sx of seizures noted. Pt remains clean and dry. Pt repositioned 
for safety and comfort. Fall, Aspiration, Seizure and Skin precautions observed. Pt remains 
resting in bed; Bed remains in the lowest position with the safety wheels engaged, call 
light within reach, side rails up x3 and bed alarm activated. Will continue plan of care. 
Will continue to monitor.

## 2020-09-05 NOTE — NUR
CASE MANAGEMENT: REVIEW



09/04/20



SI: PNA . DOWN SYNDROME . SEIZURE DISORDER. THROMBOCYTOPENIA 

98.4   53   30   140/65   99% MECH VENT FIO2 80

H/H 8.3/24.6   PLT 53   NA + 147   BUN/CREAT 43/1.6  

         ABG: pH 7.477   pCO2 20.4   HCO3 14.7   





IS: LEVOPHED QD PROTOCOL TITRATED

DOPAMINE QD PROTOCOL 

IV PHENYLEPHRINE QD PROTOCOL TITRATE 

IV LASIX X1





***ICU STATUS***

DCP: PATIENT IS FROM [facility] 

PLAN: 

ACTIVELY WEANING 

FAILED WEANING ATTEMPTS

## 2020-09-05 NOTE — NUR
NURSE NOTES:

Bed bath given to patient, turned and repositioned. Patient tolerated well, patient is 
stable, and all safety measures met. Will continue to monitor.

## 2020-09-05 NOTE — NEPHROLOGY PROGRESS NOTE
Assessment/Plan


Problem List:  


(1) DAVID (acute kidney injury)


(2) Respiratory failure requiring intubation


(3) Down's syndrome


(4) Seizure disorder


(5) Hypothyroidism


Assessment





Acute renal failure, likely due to hypotension


Acute respiratory distress, hypoxia


Seizure disorder


Hypothyroidism


Down syndrome


Full code











Fluid challenge with IV fluids and albumin


Midodrine for BP above 100 systolic


Check TSH level


Check


Correct level


Monitor renal parameters


Urine studies


Per orders


Plan


September 5: Status unchanged.  Lab reviewed.  Serum potassium 2.7.  IV 

potassium chloride ordered.  Serum creatinine low at 1.6 stable.  Blood 

pressure 90s systolic


September 4: Status quo.  Labs reviewed.  Renal parameters stable.  Serum 

creatinine down to 1.6.  Medication list reviewed.  Continues to be on 

midodrine.  Continue per consultants.


September 3: Status quo.  Labs reviewed.  Electrolytes adjusted.  Serum 

creatinine down to 1.8.  Continue per consultants.


September 2: Status quo.  Labs reviewed.  Phosphorus supplement IV given.  

Serum creatinine 2.  Continue per consultants.


September 1: Requires less pressors.  Albumin bolus given.  1 dose of Lasix IV 

ordered as the patient severely edematous.  Patient serum albumin is very low.  

Continue per consultants.


August 31: Continues to be intubated.  Labs reviewed.  Serum creatinine 1.9 

unchanged.  Blood pressure more stable.  Off 1 of the pressors.  Continue to 

monitor renal parameters.  Continue per consultants.  Patient now on 

hydrocortisone 100 mg every 8 hours.  Will decrease IV fluid.  Normal saline 

down to 50 cc an hour.


August 30: Intubated.  Labs reviewed.  Creatinine 1.9 unchanged.  Continue same 

treatment plan.  Per consultants.  Overall poor prognosis since the patient 

remains on pressors and her pulmonary status is worsening.


August 29: Remains intubated.  Labs reviewed.  Creatinine 1.9.  Blood pressure 

systolic 90s.  Continue per consultants.


August 28: Remains intubated.  Labs reviewed.  Serum creatinine lower to 2.  

Vancomycin level lower.  Remains hypotensive on pressors.  Will increase 

midodrine to 10 mg every 8 hours.  Continue per consultants.  Continue to 

monitor renal parameters.


August 27: Patient now in ICU.  Intubated.  On pressors.  Labs reviewed.  Will 

increase midodrine.  Aim to keep blood pressure over 100 systolic.  Will give 

albumin bolus.  Will check vancomycin level which was elevated when checked 

previously on August 24.  Will monitor renal parameters.  Continue per 

consultants.





Subjective


ROS Limited/Unobtainable:  Yes





Objective


Objective





Last 24 Hour Vital Signs








  Date Time  Temp Pulse Resp B/P (MAP) Pulse Ox O2 Delivery O2 Flow Rate FiO2


 


9/5/20 10:30  54 26 95/48 (64) 100   


 


9/5/20 10:00    99/39    


 


9/5/20 10:00  54 26 99/39 (59) 100   


 


9/5/20 09:30  54 26 95/45 (62) 100   


 


9/5/20 09:00    98/48    


 


9/5/20 09:00  54 30 98/48 (65) 100   


 


9/5/20 08:58  53 26     100


 


9/5/20 08:30  54 26 98/48 (65) 100   


 


9/5/20 08:00  52      


 


9/5/20 08:00    106/51    


 


9/5/20 08:00 97.5 65 29 106/51 (69) 99   


 


9/5/20 08:00        100


 


9/5/20 08:00      Mechanical Ventilator  





      Mechanical Ventilator  


 


9/5/20 07:30  51 30     100


 


9/5/20 07:30  76 26 101/48 (65) 97   


 


9/5/20 07:00  52 30 101/51 (68) 100   


 


9/5/20 07:00    101/51    


 


9/5/20 06:30  52 30 111/49 (69) 100   


 


9/5/20 06:00  52 30 100/50 (67) 100   


 


9/5/20 06:00    100/50    


 


9/5/20 05:30  52 30 107/50 (69) 100   


 


9/5/20 05:01  53 30     100


 


9/5/20 05:00  50 29 104/51 (68) 100   


 


9/5/20 05:00    104/51    


 


9/5/20 04:30  50 30 108/54 (72) 100   


 


9/5/20 04:00      Mechanical Ventilator  





      Mechanical Ventilator  


 


9/5/20 04:00 97.6 50 30 108/54 (72) 100   


 


9/5/20 04:00    108/54    


 


9/5/20 04:00        100


 


9/5/20 03:30  50 30 113/54 (73) 100   


 


9/5/20 03:24  53      


 


9/5/20 03:08  52 30     100


 


9/5/20 03:00    109/53    


 


9/5/20 03:00  51 30 109/53 (71) 100   


 


9/5/20 02:30  51 30 103/50 (67) 100   


 


9/5/20 02:00    107/50    


 


9/5/20 02:00  52 30 107/50 (69) 100   


 


9/5/20 01:30  51 30 113/57 (75) 100   


 


9/5/20 01:00  51 30     100


 


9/5/20 01:00  51 30 113/55 (74) 100   


 


9/5/20 01:00    113/55    


 


9/5/20 00:30  51 29 110/56 (74) 100   


 


9/5/20 00:00        100


 


9/5/20 00:00    114/58    


 


9/5/20 00:00 97.4 52 30 114/58 (76) 100   


 


9/5/20 00:00      Mechanical Ventilator  





      Mechanical Ventilator  


 


9/4/20 23:45  51 30 112/56 (74) 100   


 


9/4/20 23:30  51 30 108/55 (72) 100   


 


9/4/20 23:17  51      


 


9/4/20 23:15  51 30 108/52 (70) 100   


 


9/4/20 23:08  55 30     100


 


9/4/20 23:00  52 30 105/55 (72) 100   


 


9/4/20 23:00    105/55    


 


9/4/20 22:45  52 30 109/56 (73) 100   


 


9/4/20 22:30  51 29 98/55 (69) 100   


 


9/4/20 22:15    154/60    


 


9/4/20 22:00  51 29 135/64 (87) 100   


 


9/4/20 22:00    135/64    


 


9/4/20 21:30  49 30 136/63 (87) 100   


 


9/4/20 21:00    115/55    


 


9/4/20 21:00  52 29 115/55 (75) 100   


 


9/4/20 20:43  50 30     100


 


9/4/20 20:30  51 30 120/57 (78) 100   


 


9/4/20 20:00    117/59    


 


9/4/20 20:00  55 30 117/59 (78) 100   


 


9/4/20 20:00      Mechanical Ventilator  





      Mechanical Ventilator  


 


9/4/20 20:00        100


 


9/4/20 19:39  64      


 


9/4/20 19:32  71 21 89/46 (60) 100   


 


9/4/20 19:06  95 42     100


 


9/4/20 19:00  84 35 94/44 (61) 100   


 


9/4/20 19:00    94/44    


 


9/4/20 18:30  73 33 101/37 (58) 100   


 


9/4/20 18:07  70 28 85/36 99   


 


9/4/20 18:00  77 33 88/36 (53) 92   


 


9/4/20 18:00    85/36    


 


9/4/20 17:37  81 51     100


 


9/4/20 17:37  80 44 95/56    


 


9/4/20 17:30  72 40 94/56 (69) 99   


 


9/4/20 17:17  69 37     80


 


9/4/20 17:00  67 22 108/81 (90) 97   


 


9/4/20 17:00    108/81    


 


9/4/20 16:30  60 30 108/46 (66) 94   


 


9/4/20 16:00      Mechanical Ventilator  





      Mechanical Ventilator  


 


9/4/20 16:00        80


 


9/4/20 16:00  64      


 


9/4/20 16:00    108/46    


 


9/4/20 16:00 98.0 55 30 108/54 (72) 94   


 


9/4/20 15:45  53 30 134/80 (98) 97   


 


9/4/20 15:30  58 29 145/54 (84) 94   


 


9/4/20 15:24  52 30     80


 


9/4/20 15:15  53 30 109/62 (78) 95   


 


9/4/20 15:00    128/57    


 


9/4/20 15:00  55 29 128/57 (80) 96   


 


9/4/20 14:45  52 30 113/53 (73) 97   


 


9/4/20 14:30  55 30 119/53 (75) 95   


 


9/4/20 14:15  55 30 112/49 (70) 94   


 


9/4/20 14:00    113/53    


 


9/4/20 14:00  60 29 123/52 (75) 93   


 


9/4/20 13:45  59 29 127/52 (77) 94   


 


9/4/20 13:30  52 30 112/55 (74) 96   


 


9/4/20 13:15  58 29 119/50 (73) 95   


 


9/4/20 13:00  58 29 154/56 (88) 95   


 


9/4/20 13:00    154/58    


 


9/4/20 12:51  53 30     80


 


9/4/20 12:45  53 30 122/50 (74) 96   


 


9/4/20 12:30  53 30 123/54 (77) 95   


 


9/4/20 12:15  55 30 126/57 (80) 95   


 


9/4/20 12:00 98.6 57 30 123/48 (73) 96   


 


9/4/20 12:00      Mechanical Ventilator  





      Mechanical Ventilator  


 


9/4/20 12:00        100


 


9/4/20 12:00  61      


 


9/4/20 12:00    128/57    


 


9/4/20 11:45  78 28 151/74 (99) 95   


 


9/4/20 11:30  57 28 111/74 (86) 99   


 


9/4/20 11:26  66 30     80

















Intake and Output  


 


 9/4/20 9/5/20





 19:00 07:00


 


Intake Total 771.51937 ml 777.33093 ml


 


Output Total 1060 ml 940 ml


 


Balance -288.73037 ml -162.73318 ml


 


  


 


Free Water 90 ml 90 ml


 


IV Total 281.96909 ml 177.30705 ml


 


Tube Feeding 400 ml 510 ml


 


Output Urine Total 1060 ml 940 ml


 


# Bowel Movements 1 1








Laboratory Tests


9/5/20 04:00: 


White Blood Count 11.3H, Red Blood Count 2.61L, Hemoglobin 8.9L, Hematocrit 

25.9L, Mean Corpuscular Volume 99, Mean Corpuscular Hemoglobin 33.9H, Mean 

Corpuscular Hemoglobin Concent 34.3, Red Cell Distribution Width 13.6, Platelet 

Count 57L, Mean Platelet Volume 7.7, Neutrophils (%) (Auto) , Lymphocytes (%) (

Auto) , Monocytes (%) (Auto) , Eosinophils (%) (Auto) , Basophils (%) (Auto) , 

Neutrophils % (Manual) [Pending], Lymphocytes % (Manual) [Pending], Platelet 

Estimate [Pending], Platelet Morphology [Pending], Sodium Level 148H, Potassium 

Level 2.7*L, Chloride Level 115H, Carbon Dioxide Level 19L, Anion Gap 13, Blood 

Urea Nitrogen 49H, Creatinine 1.6H, Estimat Glomerular Filtration Rate 33.1, 

Glucose Level 128H, Calcium Level 8.3L, Phosphorus Level 3.3, Magnesium Level 

2.2, Total Bilirubin 1.0, Aspartate Amino Transf (AST/SGOT) 21, Alanine 

Aminotransferase (ALT/SGPT) 16, Alkaline Phosphatase 53, C-Reactive Protein, 

Quantitative 2.1H, Total Protein 4.9L, Albumin 1.5L, Globulin 3.4, Albumin/

Globulin Ratio 0.4L, Thyroid Stimulating Hormone (TSH) 0.332L, Valproic Acid (

Depakene) Level 28L


9/5/20 07:30: 


Arterial Blood pH 7.482H, Arterial Blood Partial Pressure CO2 21.8*L, Arterial 

Blood Partial Pressure O2 146.5H, Arterial Blood HCO3 15.9*L, Arterial Blood 

Oxygen Saturation 98.0, Arterial Blood Base Excess -6.3L, Cole Test Positive


Height (Feet):  5


Height (Inches):  3.00


Weight (Pounds):  172


General Appearance:  no apparent distress, lethargic


EENT:  other - Trach to vent


Cardiovascular:  normal rate, bradycardia


Respiratory/Chest:  decreased breath sounds


Abdomen:  distended











Lam Nino MD Sep 5, 2020 11:22

## 2020-09-05 NOTE — PULMONOLGY CRITICAL CARE NOTE
Critical Care - Asmt/Plan


Problems:  


(1) Acute respiratory failure


(2) Bacteremia


(3) Pneumonia


(4) Sepsis


(5) HCAP (healthcare-associated pneumonia)


(6) Seizure disorder


(7) Down's syndrome


Respiratory:  monitor respiratory rate, adjust FIO2, CXR


Cardiac:  continue pressors, continue to monitor HR/BP


Renal:  F/U I&O, keep IV fluid, check electrolytes


Infectious Disease:  check cultures, continue antibiotics


Gastrointestinal:  continue feedings/current rate


Endocrine:  monitor blood sugar, continue sliding scale insulin


Hematologic:  monitor H/H, transfuse if hgb<8.5


Neurologic:  PRN Ativan, keep patient comfortable


Affect:  PRN ativan


Time Spent (Minutes):  40


Notes Reviewed:  internist, cardio, renal


Discussed with:  nurses, consultants, 





Critical Care - Objective





Last 24 Hour Vital Signs








  Date Time  Temp Pulse Resp B/P (MAP) Pulse Ox O2 Delivery O2 Flow Rate FiO2


 


9/5/20 08:00 97.5 65 29 106/51 (69) 99   


 


9/5/20 08:00        100


 


9/5/20 08:00      Mechanical Ventilator  





      Mechanical Ventilator  


 


9/5/20 07:30  51 30     100


 


9/5/20 07:30  76 26 101/48 (65) 97   


 


9/5/20 07:00  52 30 101/51 (68) 100   


 


9/5/20 07:00    101/51    


 


9/5/20 06:30  52 30 111/49 (69) 100   


 


9/5/20 06:00  52 30 100/50 (67) 100   


 


9/5/20 06:00    100/50    


 


9/5/20 05:30  52 30 107/50 (69) 100   


 


9/5/20 05:01  53 30     100


 


9/5/20 05:00  50 29 104/51 (68) 100   


 


9/5/20 05:00    104/51    


 


9/5/20 04:30  50 30 108/54 (72) 100   


 


9/5/20 04:00      Mechanical Ventilator  





      Mechanical Ventilator  


 


9/5/20 04:00 97.6 50 30 108/54 (72) 100   


 


9/5/20 04:00    108/54    


 


9/5/20 04:00        100


 


9/5/20 03:30  50 30 113/54 (73) 100   


 


9/5/20 03:24  53      


 


9/5/20 03:08  52 30     100


 


9/5/20 03:00    109/53    


 


9/5/20 03:00  51 30 109/53 (71) 100   


 


9/5/20 02:30  51 30 103/50 (67) 100   


 


9/5/20 02:00    107/50    


 


9/5/20 02:00  52 30 107/50 (69) 100   


 


9/5/20 01:30  51 30 113/57 (75) 100   


 


9/5/20 01:00  51 30     100


 


9/5/20 01:00  51 30 113/55 (74) 100   


 


9/5/20 01:00    113/55    


 


9/5/20 00:30  51 29 110/56 (74) 100   


 


9/5/20 00:00        100


 


9/5/20 00:00    114/58    


 


9/5/20 00:00 97.4 52 30 114/58 (76) 100   


 


9/5/20 00:00      Mechanical Ventilator  





      Mechanical Ventilator  


 


9/4/20 23:45  51 30 112/56 (74) 100   


 


9/4/20 23:30  51 30 108/55 (72) 100   


 


9/4/20 23:17  51      


 


9/4/20 23:15  51 30 108/52 (70) 100   


 


9/4/20 23:08  55 30     100


 


9/4/20 23:00  52 30 105/55 (72) 100   


 


9/4/20 23:00    105/55    


 


9/4/20 22:45  52 30 109/56 (73) 100   


 


9/4/20 22:30  51 29 98/55 (69) 100   


 


9/4/20 22:15    154/60    


 


9/4/20 22:00  51 29 135/64 (87) 100   


 


9/4/20 22:00    135/64    


 


9/4/20 21:30  49 30 136/63 (87) 100   


 


9/4/20 21:00    115/55    


 


9/4/20 21:00  52 29 115/55 (75) 100   


 


9/4/20 20:43  50 30     100


 


9/4/20 20:30  51 30 120/57 (78) 100   


 


9/4/20 20:00    117/59    


 


9/4/20 20:00  55 30 117/59 (78) 100   


 


9/4/20 20:00      Mechanical Ventilator  





      Mechanical Ventilator  


 


9/4/20 20:00        100


 


9/4/20 19:39  64      


 


9/4/20 19:32  71 21 89/46 (60) 100   


 


9/4/20 19:06  95 42     100


 


9/4/20 19:00  84 35 94/44 (61) 100   


 


9/4/20 19:00    94/44    


 


9/4/20 18:30  73 33 101/37 (58) 100   


 


9/4/20 18:07  70 28 85/36 99   


 


9/4/20 18:00  77 33 88/36 (53) 92   


 


9/4/20 18:00    85/36    


 


9/4/20 17:37  81 51     100


 


9/4/20 17:37  80 44 95/56    


 


9/4/20 17:30  72 40 94/56 (69) 99   


 


9/4/20 17:17  69 37     80


 


9/4/20 17:00  67 22 108/81 (90) 97   


 


9/4/20 17:00    108/81    


 


9/4/20 16:30  60 30 108/46 (66) 94   


 


9/4/20 16:00      Mechanical Ventilator  





      Mechanical Ventilator  


 


9/4/20 16:00        80


 


9/4/20 16:00  64      


 


9/4/20 16:00    108/46    


 


9/4/20 16:00 98.0 55 30 108/54 (72) 94   


 


9/4/20 15:45  53 30 134/80 (98) 97   


 


9/4/20 15:30  58 29 145/54 (84) 94   


 


9/4/20 15:24  52 30     80


 


9/4/20 15:15  53 30 109/62 (78) 95   


 


9/4/20 15:00    128/57    


 


9/4/20 15:00  55 29 128/57 (80) 96   


 


9/4/20 14:45  52 30 113/53 (73) 97   


 


9/4/20 14:30  55 30 119/53 (75) 95   


 


9/4/20 14:15  55 30 112/49 (70) 94   


 


9/4/20 14:00    113/53    


 


9/4/20 14:00  60 29 123/52 (75) 93   


 


9/4/20 13:45  59 29 127/52 (77) 94   


 


9/4/20 13:30  52 30 112/55 (74) 96   


 


9/4/20 13:15  58 29 119/50 (73) 95   


 


9/4/20 13:00  58 29 154/56 (88) 95   


 


9/4/20 13:00    154/58    


 


9/4/20 12:51  53 30     80


 


9/4/20 12:45  53 30 122/50 (74) 96   


 


9/4/20 12:30  53 30 123/54 (77) 95   


 


9/4/20 12:15  55 30 126/57 (80) 95   


 


9/4/20 12:00 98.6 57 30 123/48 (73) 96   


 


9/4/20 12:00      Mechanical Ventilator  





      Mechanical Ventilator  


 


9/4/20 12:00        100


 


9/4/20 12:00  61      


 


9/4/20 12:00    128/57    


 


9/4/20 11:45  78 28 151/74 (99) 95   


 


9/4/20 11:30  57 28 111/74 (86) 99   


 


9/4/20 11:26  66 30     80


 


9/4/20 11:19    100/46    


 


9/4/20 11:15  49 30 100/46 (64) 99   


 


9/4/20 11:00  50 30 113/52 (72) 100   


 


9/4/20 10:45  54 30 98/50 (66) 100   


 


9/4/20 10:30  58 29 109/53 (71) 99   


 


9/4/20 10:15  55 30 108/52 (70) 100   


 


9/4/20 10:00    98/50    


 


9/4/20 10:00  57 27 96/50 (65) 99   


 


9/4/20 09:45  61 29 107/68 (81) 99   


 


9/4/20 09:30  55 27 126/57 (80) 97   


 


9/4/20 09:15  53 30 111/51 (71) 100   


 


9/4/20 09:00  59 28 109/53 (71) 100   


 


9/4/20 09:00    107/68    


 


9/4/20 08:57  54 30     80


 


9/4/20 08:45  61 25 116/47 (70) 99   


 


9/4/20 08:43    135/58    


 


9/4/20 08:30  53 30 135/58 (83) 99   








Status:  sedated


Condition:  critical


HEENT:  atraumatic, normocephalic


Neck:  full ROM


Heart:  HR/BP stable


Abdomen:  soft, non-tender, feeding tube


Extremities:  edema


Decubiti:  stage


Accucheck:  131





Critical Care - Subjective


ROS Limited/Unobtainable:  Yes


Condition:  critical


EKG Rhythm:  Sinus Rhythm


FI02:  100


Vent Support Breath Rate:  30


Vent Support Mode:  AC


Vent Tidal Volume:  500


Sputum Amount:  Scant


PEEP:  10.0


PIP:  56


Tube Feeding Amount:  50


I&O:











Intake and Output  


 


 9/4/20 9/5/20





 19:00 07:00


 


Intake Total 771.50035 ml 777.71417 ml


 


Output Total 1060 ml 940 ml


 


Balance -288.38279 ml -162.13205 ml


 


  


 


Free Water 90 ml 90 ml


 


IV Total 281.90282 ml 177.80569 ml


 


Tube Feeding 400 ml 510 ml


 


Output Urine Total 1060 ml 940 ml


 


# Bowel Movements 1 1








ET-Tube:  7.5


ET Position:  22


Labs:





Laboratory Tests








Test


  9/5/20


04:00 9/5/20


07:30


 


White Blood Count


  11.3 K/UL


(4.8-10.8)  H 


 


 


Red Blood Count


  2.61 M/UL


(4.20-5.40)  L 


 


 


Hemoglobin


  8.9 G/DL


(12.0-16.0)  L 


 


 


Hematocrit


  25.9 %


(37.0-47.0)  L 


 


 


Mean Corpuscular Volume 99 FL (80-99)   


 


Mean Corpuscular Hemoglobin


  33.9 PG


(27.0-31.0)  H 


 


 


Mean Corpuscular Hemoglobin


Concent 34.3 G/DL


(32.0-36.0) 


 


 


Red Cell Distribution Width


  13.6 %


(11.6-14.8) 


 


 


Platelet Count


  57 K/UL


(150-450)  L 


 


 


Mean Platelet Volume


  7.7 FL


(6.5-10.1) 


 


 


Neutrophils (%) (Auto)


  % (45.0-75.0)


  


 


 


Lymphocytes (%) (Auto)


  % (20.0-45.0)


  


 


 


Monocytes (%) (Auto)  % (1.0-10.0)   


 


Eosinophils (%) (Auto)  % (0.0-3.0)   


 


Basophils (%) (Auto)  % (0.0-2.0)   


 


Sodium Level


  148 MMOL/L


(136-145)  H 


 


 


Potassium Level


  2.7 MMOL/L


(3.5-5.1)  *L 


 


 


Chloride Level


  115 MMOL/L


()  H 


 


 


Carbon Dioxide Level


  19 MMOL/L


(21-32)  L 


 


 


Anion Gap


  13 mmol/L


(5-15) 


 


 


Blood Urea Nitrogen


  49 mg/dL


(7-18)  H 


 


 


Creatinine


  1.6 MG/DL


(0.55-1.30)  H 


 


 


Estimat Glomerular Filtration


Rate 33.1 mL/min


(>60) 


 


 


Glucose Level


  128 MG/DL


()  H 


 


 


Calcium Level


  8.3 MG/DL


(8.5-10.1)  L 


 


 


Phosphorus Level


  3.3 MG/DL


(2.5-4.9) 


 


 


Magnesium Level


  2.2 MG/DL


(1.8-2.4) 


 


 


Total Bilirubin


  1.0 MG/DL


(0.2-1.0) 


 


 


Aspartate Amino Transf


(AST/SGOT) 21 U/L (15-37)


  


 


 


Alanine Aminotransferase


(ALT/SGPT) 16 U/L (12-78)


  


 


 


Alkaline Phosphatase


  53 U/L


() 


 


 


C-Reactive Protein,


Quantitative 2.1 mg/dL


(0.00-0.90)  H 


 


 


Total Protein


  4.9 G/DL


(6.4-8.2)  L 


 


 


Albumin


  1.5 G/DL


(3.4-5.0)  L 


 


 


Globulin 3.4 g/dL   


 


Albumin/Globulin Ratio


  0.4 (1.0-2.7)


L 


 


 


Thyroid Stimulating Hormone


(TSH) 0.332 uiU/mL


(0.358-3.740) 


 


 


Valproic Acid (Depakene) Level


  28 MCG/ML


()  L 


 


 


Arterial Blood pH


  


  7.482


(7.350-7.450)


 


Arterial Blood Partial


Pressure CO2 


  21.8 mmHg


(35.0-45.0)  *L


 


Arterial Blood Partial


Pressure O2 


  146.5 mmHg


(75.0-100.0)  H


 


Arterial Blood HCO3


  


  15.9 mmol/L


(22.0-26.0)  *L


 


Arterial Blood Oxygen


Saturation 


  98.0 %


()


 


Arterial Blood Base Excess  -6.3 (-2-2)  L


 


Cole Test  Positive  

















Chano Cherry MD Sep 5, 2020 08:27

## 2020-09-05 NOTE — NUR
NURSE NOTES:

Left message for physician to report abnormal lab values including k 

Will await a call back and continue to monitor.

## 2020-09-05 NOTE — NUR
NURSE NOTES:

Bedside assessment performed, assessed pt with a FLACC scale of 0 noted. VS obtained and 
remain stable at this time. Dopamine continues to infuse at 2 mcg/kg/min with no adverse 
effects noted from medication administration. VS obtained remain consistent and stable at 
this time.. Respiratory status remains stable, RR remains at 30 consistent with ventilator 
settings. Pt remains clean and dry. Pt repositioned for comfort and safety. Bilateral upper 
and lower extremities elevated, dependent edema noted. Pt noted to be tolerating GT feeding 
well, minimal residual 0-10mL noted for duration of shift. Fall, Aspiration, Seizure and 
Skin precautions observed. Pt remains resting in bed; Bed remains in the lowest position 
with the safety wheels engaged, call light within reach, side rails up x3 and bed alarm 
activated. Will continue plan of care. Will continue to monitor.

## 2020-09-05 NOTE — NUR
NURSE HAND-OFF REPORT: 



Latest Vital Signs: Temperature 99.2 , Pulse 53 , B/P 102 /52 , Respiratory Rate 26 , O2 SAT 
100 , Mechanical Ventilator, O2 Flow Rate 15.0 .  

Vital Sign Comment: 



EKG Rhythm: Sinus Rhythm

Rhythm change?: N 

MD Notified?: -

MD Response: 



Latest Momin Fall Score: 50  

Fall Risk: High Risk 

Safety Measures: Call light Within Reach, Bed Alarm Zone 2, Side Rails Side Rails x3, Bed 
position Low and Locked.

Fall Precautions: 

Door Sign

Patient Fall Education



Report given to Makeda TRAYLOR.

## 2020-09-05 NOTE — NUR
NURSE NOTES:

Medications given as prescribed, no adverse reaction noted. Provided oral care for patient, 
turned and repositioned. Will continue to monitor.

## 2020-09-05 NOTE — NUR
NURSE NOTES:

Received patient from Makeda TRAYLOR. Patient is obtunded, Sinus Bradycardia on the heart 
monitor, HR 51. Receiving oxygen via ET Tube 7.5 23cm at the lip line, vent settings: AC 30, 
, FiO2 100%, PEEP 10. Left Upper Arm PICC is intact and receiving Dopamine at 
2mcg/kg/min. G-tube is intact and receiving Jevity @50cc/hr.  Valle catheter is intact and 
draining. Bed is locked placed in lowest position, side rails up x3, bed alarm on, head of 
bed elevated. Will continue to monitor.

-------------------------------------------------------------------------------

Addendum: 09/05/20 at 0804 by Rosa Richter RN

-------------------------------------------------------------------------------

NURSE NOTES:

Received patient from Makeda TRAYLOR. Patient is obtunded, Sinus Bradycardia on the heart 
monitor, HR 51. Receiving oxygen via ET Tube 7.5 22cm at the lip line, vent settings: AC 30, 
, FiO2 100%, PEEP 10. Left Upper Arm PICC is intact and receiving Dopamine at 
2mcg/kg/min. G-tube is intact and receiving Jevity @50cc/hr.  Valle catheter is intact and 
draining. Bed is locked placed in lowest position, side rails up x3, bed alarm on, head of 
bed elevated. Will continue to monitor.

## 2020-09-05 NOTE — NUR
NURSE HAND-OFF REPORT: 



Latest Vital Signs: Temperature 97.6 , Pulse 52 , B/P 111 /49 , Respiratory Rate 30 , O2 SAT 
100 , Mechanical Ventilator, O2 Flow Rate 15.0 .  

Vital Sign Comment: VS remain stable; improvement in RR and WOB, titrated Dopamine infusion 
to 2mcg/kg/min without incident 



EKG Rhythm: Sinus Bradycardia

Rhythm change?: N 

MD Notified?: -

MD Response: N/A



Latest Momin Fall Score: 50  

Fall Risk: High Risk 

Safety Measures: Call light Within Reach, Bed Alarm Zone 2, Side Rails Side Rails x3, Bed 
position Low and Locked.

Fall Precautions: 

Door Sign

Patient Fall Education



Report given to LAYTON Martins. Endorsed plan of care and pending call regarding abnormal lab 
values.

## 2020-09-05 NOTE — NUR
NURSE NOTES:

Pt provided with a partial bed bath, oral care and wound care per orders. Moderate, brown 
liquid stool noted. ROM exercises performed per pt tolerance. Pt tolerated care well. 
Bedside assessment performed, assessed pt with a FLACC scale of 0 noted. VS obtained and 
remain stable at this time. Dopamine continues to infuse at 2 mcg/kg/min with no adverse 
effects noted from medication administration. VS obtained and SBP remains >100 and HR 
remains >50 at this time. Respiratory status remains stable, RR remains between 30-31 
consistent with ventilator settings. WOB remains notably decreased from start of shift. Pt 
remains clean and dry. Pt repositioned for comfort and safety. Bilateral upper and lower 
extremities elevated, dependent edema noted. Fall, Aspiration, Seizure and Skin precautions 
observed. Pt remains resting in bed; Bed remains in the lowest position with the safety 
wheels engaged, call light within reach, side rails up x3 and bed alarm activated. Will 
continue plan of care. Will continue to monitor.

## 2020-09-05 NOTE — CARDIOLOGY PROGRESS NOTE
Assessment/Plan


Assessment/Plan


pneumonia, severe hypotension,on pressors, attempt to wean off





Subjective


Subjective


The patient is sedated and intubated. Does not respond to verbal stimuli





Objective





Last 24 Hour Vital Signs








  Date Time  Temp Pulse Resp B/P (MAP) Pulse Ox O2 Delivery O2 Flow Rate FiO2


 


9/5/20 17:30  55 26 93/48 (63) 100   


 


9/5/20 17:00    93/48    


 


9/5/20 17:00  56 26 89/45 (60) 100   


 


9/5/20 16:41  60 26     100


 


9/5/20 16:30  65 26 93/44 (60) 100   


 


9/5/20 16:00        100


 


9/5/20 16:00      Mechanical Ventilator  





      Mechanical Ventilator  


 


9/5/20 16:00  63      


 


9/5/20 16:00    89/45    


 


9/5/20 16:00 99.2 64 26 84/44 (57) 100   


 


9/5/20 15:30  63 26 119/60 (79) 100   


 


9/5/20 15:00  72 26 93/40 (57) 100   


 


9/5/20 15:00    93/40    


 


9/5/20 14:30  61 26     100


 


9/5/20 14:30  68 26 99/38 (58) 100   


 


9/5/20 14:00    96/41    


 


9/5/20 14:00    99/38    


 


9/5/20 14:00  66 26 96/41 (59) 100   


 


9/5/20 13:30  64 26 92/42 (59) 100   


 


9/5/20 13:00    98/44    


 


9/5/20 13:00  57 26 98/44 (62) 100   


 


9/5/20 12:52  54 26     100


 


9/5/20 12:30  55 26 89/49 (62) 100   


 


9/5/20 12:00 98.5 55 26 95/47 (63) 99   


 


9/5/20 12:00    95/47    


 


9/5/20 12:00      Mechanical Ventilator  





      Mechanical Ventilator  


 


9/5/20 12:00        100


 


9/5/20 11:30  55 26 98/51 (67) 100   


 


9/5/20 11:30  56      


 


9/5/20 11:15    95/47    


 


9/5/20 11:05  55 26     100


 


9/5/20 11:00    97/49    


 


9/5/20 11:00  54 26 97/49 (65) 100   


 


9/5/20 10:30  54 26 95/48 (64) 100   


 


9/5/20 10:00    99/39    


 


9/5/20 10:00  54 26 99/39 (59) 100   


 


9/5/20 09:30  54 26 95/45 (62) 100   


 


9/5/20 09:00    98/48    


 


9/5/20 09:00  54 30 98/48 (65) 100   


 


9/5/20 08:58  53 26     100


 


9/5/20 08:30  54 26 98/48 (65) 100   


 


9/5/20 08:00  52      


 


9/5/20 08:00    106/51    


 


9/5/20 08:00 97.5 65 29 106/51 (69) 99   


 


9/5/20 08:00        100


 


9/5/20 08:00      Mechanical Ventilator  





      Mechanical Ventilator  


 


9/5/20 07:30  51 30     100


 


9/5/20 07:30  76 26 101/48 (65) 97   


 


9/5/20 07:00  52 30 101/51 (68) 100   


 


9/5/20 07:00    101/51    


 


9/5/20 06:30  52 30 111/49 (69) 100   


 


9/5/20 06:00  52 30 100/50 (67) 100   


 


9/5/20 06:00    100/50    


 


9/5/20 05:30  52 30 107/50 (69) 100   


 


9/5/20 05:01  53 30     100


 


9/5/20 05:00  50 29 104/51 (68) 100   


 


9/5/20 05:00    104/51    


 


9/5/20 04:30  50 30 108/54 (72) 100   


 


9/5/20 04:00      Mechanical Ventilator  





      Mechanical Ventilator  


 


9/5/20 04:00 97.6 50 30 108/54 (72) 100   


 


9/5/20 04:00    108/54    


 


9/5/20 04:00        100


 


9/5/20 03:30  50 30 113/54 (73) 100   


 


9/5/20 03:24  53      


 


9/5/20 03:08  52 30     100


 


9/5/20 03:00    109/53    


 


9/5/20 03:00  51 30 109/53 (71) 100   


 


9/5/20 02:30  51 30 103/50 (67) 100   


 


9/5/20 02:00    107/50    


 


9/5/20 02:00  52 30 107/50 (69) 100   


 


9/5/20 01:30  51 30 113/57 (75) 100   


 


9/5/20 01:00  51 30     100


 


9/5/20 01:00  51 30 113/55 (74) 100   


 


9/5/20 01:00    113/55    


 


9/5/20 00:30  51 29 110/56 (74) 100   


 


9/5/20 00:00        100


 


9/5/20 00:00    114/58    


 


9/5/20 00:00 97.4 52 30 114/58 (76) 100   


 


9/5/20 00:00      Mechanical Ventilator  





      Mechanical Ventilator  


 


9/4/20 23:45  51 30 112/56 (74) 100   


 


9/4/20 23:30  51 30 108/55 (72) 100   


 


9/4/20 23:17  51      


 


9/4/20 23:15  51 30 108/52 (70) 100   


 


9/4/20 23:08  55 30     100


 


9/4/20 23:00  52 30 105/55 (72) 100   


 


9/4/20 23:00    105/55    


 


9/4/20 22:45  52 30 109/56 (73) 100   


 


9/4/20 22:30  51 29 98/55 (69) 100   


 


9/4/20 22:15    154/60    


 


9/4/20 22:00  51 29 135/64 (87) 100   


 


9/4/20 22:00    135/64    


 


9/4/20 21:30  49 30 136/63 (87) 100   


 


9/4/20 21:00    115/55    


 


9/4/20 21:00  52 29 115/55 (75) 100   


 


9/4/20 20:43  50 30     100


 


9/4/20 20:30  51 30 120/57 (78) 100   


 


9/4/20 20:00    117/59    


 


9/4/20 20:00  55 30 117/59 (78) 100   


 


9/4/20 20:00      Mechanical Ventilator  





      Mechanical Ventilator  


 


9/4/20 20:00        100


 


9/4/20 19:39  64      


 


9/4/20 19:32  71 21 89/46 (60) 100   


 


9/4/20 19:06  95 42     100


 


9/4/20 19:00  84 35 94/44 (61) 100   


 


9/4/20 19:00    94/44    


 


9/4/20 18:30  73 33 101/37 (58) 100   








General Appearance:  on vent, other -  not responsive


EENT:  other -  no asymmetry


Neck:  no JVD


Rhythm:  SB


Cardiovascular:  bradycardia, systolic murmur


Respiratory/Chest:  decreased breath sounds, crackles/rales


Abdomen:  other


Extremities:  other - generalized edema











Intake and Output  


 


 9/4/20 9/5/20





 19:00 07:00


 


Intake Total 771.96516 ml 777.92724 ml


 


Output Total 1060 ml 940 ml


 


Balance -288.14106 ml -162.79609 ml


 


  


 


Free Water 90 ml 90 ml


 


IV Total 281.59870 ml 177.22198 ml


 


Tube Feeding 400 ml 510 ml


 


Output Urine Total 1060 ml 940 ml


 


# Bowel Movements 1 1











Laboratory Tests








Test


  9/5/20


04:00 9/5/20


07:30


 


White Blood Count


  11.3 K/UL


(4.8-10.8)  H 


 


 


Red Blood Count


  2.61 M/UL


(4.20-5.40)  L 


 


 


Hemoglobin


  8.9 G/DL


(12.0-16.0)  L 


 


 


Hematocrit


  25.9 %


(37.0-47.0)  L 


 


 


Mean Corpuscular Volume 99 FL (80-99)   


 


Mean Corpuscular Hemoglobin


  33.9 PG


(27.0-31.0)  H 


 


 


Mean Corpuscular Hemoglobin


Concent 34.3 G/DL


(32.0-36.0) 


 


 


Red Cell Distribution Width


  13.6 %


(11.6-14.8) 


 


 


Platelet Count


  57 K/UL


(150-450)  L 


 


 


Mean Platelet Volume


  7.7 FL


(6.5-10.1) 


 


 


Neutrophils (%) (Auto)


  % (45.0-75.0)


  


 


 


Lymphocytes (%) (Auto)


  % (20.0-45.0)


  


 


 


Monocytes (%) (Auto)  % (1.0-10.0)   


 


Eosinophils (%) (Auto)  % (0.0-3.0)   


 


Basophils (%) (Auto)  % (0.0-2.0)   


 


Differential Total Cells


Counted 100  


  


 


 


Neutrophils % (Manual) 81 % (45-75)  H 


 


Lymphocytes % (Manual) 10 % (20-45)  L 


 


Monocytes % (Manual) 7 % (1-10)   


 


Eosinophils % (Manual) 0 % (0-3)   


 


Basophils % (Manual) 0 % (0-2)   


 


Band Neutrophils 2 % (0-8)   


 


Platelet Estimate Decreased  L 


 


Platelet Morphology Normal   


 


Hypochromasia 1+   


 


Sodium Level


  148 MMOL/L


(136-145)  H 


 


 


Potassium Level


  2.7 MMOL/L


(3.5-5.1)  *L 


 


 


Chloride Level


  115 MMOL/L


()  H 


 


 


Carbon Dioxide Level


  19 MMOL/L


(21-32)  L 


 


 


Anion Gap


  13 mmol/L


(5-15) 


 


 


Blood Urea Nitrogen


  49 mg/dL


(7-18)  H 


 


 


Creatinine


  1.6 MG/DL


(0.55-1.30)  H 


 


 


Estimat Glomerular Filtration


Rate 33.1 mL/min


(>60) 


 


 


Glucose Level


  128 MG/DL


()  H 


 


 


Calcium Level


  8.3 MG/DL


(8.5-10.1)  L 


 


 


Phosphorus Level


  3.3 MG/DL


(2.5-4.9) 


 


 


Magnesium Level


  2.2 MG/DL


(1.8-2.4) 


 


 


Total Bilirubin


  1.0 MG/DL


(0.2-1.0) 


 


 


Aspartate Amino Transf


(AST/SGOT) 21 U/L (15-37)


  


 


 


Alanine Aminotransferase


(ALT/SGPT) 16 U/L (12-78)


  


 


 


Alkaline Phosphatase


  53 U/L


() 


 


 


C-Reactive Protein,


Quantitative 2.1 mg/dL


(0.00-0.90)  H 


 


 


Total Protein


  4.9 G/DL


(6.4-8.2)  L 


 


 


Albumin


  1.5 G/DL


(3.4-5.0)  L 


 


 


Globulin 3.4 g/dL   


 


Albumin/Globulin Ratio


  0.4 (1.0-2.7)


L 


 


 


Thyroid Stimulating Hormone


(TSH) 0.332 uiU/mL


(0.358-3.740) 


 


 


Valproic Acid (Depakene) Level


  28 MCG/ML


()  L 


 


 


Arterial Blood pH


  


  7.482


(7.350-7.450)


 


Arterial Blood Partial


Pressure CO2 


  21.8 mmHg


(35.0-45.0)  *L


 


Arterial Blood Partial


Pressure O2 


  146.5 mmHg


(75.0-100.0)  H


 


Arterial Blood HCO3


  


  15.9 mmol/L


(22.0-26.0)  *L


 


Arterial Blood Oxygen


Saturation 


  98.0 %


()


 


Arterial Blood Base Excess  -6.3 (-2-2)  L


 


Cole Test  Positive  

















Ericka,Verito Y. MD Sep 5, 2020 18:11

## 2020-09-05 NOTE — NUR
NURSE NOTES:

Bedside assessment performed, assessed pt with a FLACC scale of 0 noted. VS obtained and 
remain stable at this time. Dopamine continues to infuse at 2 mcg/kg/min with no adverse 
effects noted from medication administration. VS obtained remain consistent and stable at 
this time. Respiratory status remains stable, RR remains at 30 consistent with ventilator 
settings. Thick, yellow secretions noted while suctioning. Pt remains clean and dry. Pt 
repositioned for comfort and safety. Pt noted to be tolerating GT feeding well, minimal 
residual 0mL noted at this time, increased GT feeding rate to 50mL, goal met. Will consult 
with nutrition in order to obtain Marcio as prescribed. Fall, Aspiration, Seizure and Skin 
precautions observed. Pt remains resting in bed; Bed remains in the lowest position with the 
safety wheels engaged, call light within reach, side rails up x3 and bed alarm activated. 
Will continue plan of care. Will continue to monitor.

## 2020-09-05 NOTE — NUR
NURSE NOTES:

Pt provided with a CHG bed bath, oral care and linen change. ROM exercises performed per pt 
tolerance. Pt tolerated care well. Bedside assessment performed, assessed pt with a FLACC 
scale of 0 noted. VS obtained and remain stable at this time. Dopamine continues to infuse 
at 2 mcg/kg/min with no adverse effects noted from medication administration. Respiratory 
status remains stable, RR remains between 26-28 consistent with ventilator settings. Pt 
remains clean and dry. Pt repositioned for comfort and safety. Fall, Aspiration, Seizure and 
Skin precautions observed. Pt remains resting in bed; Bed remains in the lowest position 
with the safety wheels engaged, call light within reach, side rails up x3 and bed alarm 
activated. Will continue plan of care. Will continue to monitor.

## 2020-09-05 NOTE — NUR
NURSE NOTES:

Received patient and report from LAYTON Martins. Patient is observed resting in bed and noted to 
be lethargic but responsive to stimuli; withdraws to pain, opens eyes spontaneously, SMITH  
and noted to be tracking. Pt is currently able to follow very simple commands. No pain noted 
upon assessment. Pt is currently orally intubated ett size 7.5 noted to be 22 @ lip. Vent 
settings as follows: AC 26  FiO2 100% PEEP 10 with an O2 saturation of 100% noted at 
this time. No s/sx of acute respiratory distress noted at this time, pt tolerating current 
vent settings well. Bilateral lower lobe breath sounds noted to be diminished upon 
auscultation and rhonchi noted in bilateral upper and lower lobes. Pt noted to be SB on tele 
monitor with a HR of 52 with no s/sx of acute cardiac distress noted. Left upper arm PICC 
line noted which remains asymptomatic, intact and patent. Central line dressing remains 
clean ,dry and intact. Dopamine currently infusing at 2 mcg/kg/min. No s/sx of adverse 
effects noted at this time from medication administration. G tube noted which remains intact 
and patent. Jevity 1.2 infusing at 50mL/hr as prescribed with no residual noted at this 
time. Active bowel sounds noted in all four quadrants, abdomen remains large, soft and 
round. Diagnostics reviewed at bedside. Skin alterations noted. Pt repositioned for comfort 
and safety. Fall, Aspiration, Seizure and Skin precautions observed. Pt remains resting in 
bed; Bed remains in the lowest position with the safety wheels engaged, call light within 
reach, side rails up x3 and bed alarm activated. Will continue plan of care. Will continue 
to monitor.

## 2020-09-05 NOTE — DIAGNOSTIC IMAGING REPORT
EXAM:

  XR Chest, 1 View

 

CLINICAL HISTORY:

  DYSPNEA

 

TECHNIQUE:

  Frontal view of the chest.

 

COMPARISON:

Chest radiograph September 04, 2020.  

 

FINDINGS/IMPRESSION:

Endotracheal tube terminates 1.4 cm above the lisandro.  Retraction by 

approximately 2.5 cm recommended.

 

EKG leads overlie the patient.

 

Small bilateral pleural effusions with minor edema.  Stable edema with 

mild worsening in the degree of pleural effusion on the right.

 

Cardiomegaly.

## 2020-09-06 VITALS — DIASTOLIC BLOOD PRESSURE: 44 MMHG | SYSTOLIC BLOOD PRESSURE: 102 MMHG

## 2020-09-06 VITALS — SYSTOLIC BLOOD PRESSURE: 106 MMHG | DIASTOLIC BLOOD PRESSURE: 45 MMHG

## 2020-09-06 VITALS — DIASTOLIC BLOOD PRESSURE: 44 MMHG | SYSTOLIC BLOOD PRESSURE: 98 MMHG

## 2020-09-06 VITALS — SYSTOLIC BLOOD PRESSURE: 109 MMHG | DIASTOLIC BLOOD PRESSURE: 50 MMHG

## 2020-09-06 VITALS — SYSTOLIC BLOOD PRESSURE: 102 MMHG | DIASTOLIC BLOOD PRESSURE: 48 MMHG

## 2020-09-06 VITALS — DIASTOLIC BLOOD PRESSURE: 44 MMHG | SYSTOLIC BLOOD PRESSURE: 103 MMHG

## 2020-09-06 VITALS — DIASTOLIC BLOOD PRESSURE: 47 MMHG | SYSTOLIC BLOOD PRESSURE: 98 MMHG

## 2020-09-06 VITALS — SYSTOLIC BLOOD PRESSURE: 104 MMHG | DIASTOLIC BLOOD PRESSURE: 48 MMHG

## 2020-09-06 VITALS — DIASTOLIC BLOOD PRESSURE: 44 MMHG | SYSTOLIC BLOOD PRESSURE: 96 MMHG

## 2020-09-06 VITALS — DIASTOLIC BLOOD PRESSURE: 47 MMHG | SYSTOLIC BLOOD PRESSURE: 93 MMHG

## 2020-09-06 VITALS — DIASTOLIC BLOOD PRESSURE: 44 MMHG | SYSTOLIC BLOOD PRESSURE: 91 MMHG

## 2020-09-06 VITALS — SYSTOLIC BLOOD PRESSURE: 99 MMHG | DIASTOLIC BLOOD PRESSURE: 45 MMHG

## 2020-09-06 VITALS — DIASTOLIC BLOOD PRESSURE: 41 MMHG | SYSTOLIC BLOOD PRESSURE: 96 MMHG

## 2020-09-06 VITALS — DIASTOLIC BLOOD PRESSURE: 44 MMHG | SYSTOLIC BLOOD PRESSURE: 104 MMHG

## 2020-09-06 VITALS — SYSTOLIC BLOOD PRESSURE: 104 MMHG | DIASTOLIC BLOOD PRESSURE: 52 MMHG

## 2020-09-06 VITALS — DIASTOLIC BLOOD PRESSURE: 47 MMHG | SYSTOLIC BLOOD PRESSURE: 107 MMHG

## 2020-09-06 VITALS — DIASTOLIC BLOOD PRESSURE: 46 MMHG | SYSTOLIC BLOOD PRESSURE: 102 MMHG

## 2020-09-06 VITALS — DIASTOLIC BLOOD PRESSURE: 54 MMHG | SYSTOLIC BLOOD PRESSURE: 111 MMHG

## 2020-09-06 VITALS — DIASTOLIC BLOOD PRESSURE: 59 MMHG | SYSTOLIC BLOOD PRESSURE: 111 MMHG

## 2020-09-06 VITALS — SYSTOLIC BLOOD PRESSURE: 105 MMHG | DIASTOLIC BLOOD PRESSURE: 47 MMHG

## 2020-09-06 VITALS — DIASTOLIC BLOOD PRESSURE: 46 MMHG | SYSTOLIC BLOOD PRESSURE: 104 MMHG

## 2020-09-06 VITALS — DIASTOLIC BLOOD PRESSURE: 44 MMHG | SYSTOLIC BLOOD PRESSURE: 99 MMHG

## 2020-09-06 VITALS — SYSTOLIC BLOOD PRESSURE: 101 MMHG | DIASTOLIC BLOOD PRESSURE: 45 MMHG

## 2020-09-06 VITALS — DIASTOLIC BLOOD PRESSURE: 40 MMHG | SYSTOLIC BLOOD PRESSURE: 92 MMHG

## 2020-09-06 VITALS — SYSTOLIC BLOOD PRESSURE: 114 MMHG | DIASTOLIC BLOOD PRESSURE: 42 MMHG

## 2020-09-06 VITALS — DIASTOLIC BLOOD PRESSURE: 50 MMHG | SYSTOLIC BLOOD PRESSURE: 97 MMHG

## 2020-09-06 VITALS — SYSTOLIC BLOOD PRESSURE: 105 MMHG | DIASTOLIC BLOOD PRESSURE: 49 MMHG

## 2020-09-06 VITALS — SYSTOLIC BLOOD PRESSURE: 102 MMHG | DIASTOLIC BLOOD PRESSURE: 43 MMHG

## 2020-09-06 VITALS — SYSTOLIC BLOOD PRESSURE: 102 MMHG | DIASTOLIC BLOOD PRESSURE: 51 MMHG

## 2020-09-06 VITALS — SYSTOLIC BLOOD PRESSURE: 99 MMHG | DIASTOLIC BLOOD PRESSURE: 51 MMHG

## 2020-09-06 VITALS — DIASTOLIC BLOOD PRESSURE: 47 MMHG | SYSTOLIC BLOOD PRESSURE: 108 MMHG

## 2020-09-06 VITALS — SYSTOLIC BLOOD PRESSURE: 100 MMHG | DIASTOLIC BLOOD PRESSURE: 61 MMHG

## 2020-09-06 VITALS — DIASTOLIC BLOOD PRESSURE: 47 MMHG | SYSTOLIC BLOOD PRESSURE: 96 MMHG

## 2020-09-06 VITALS — SYSTOLIC BLOOD PRESSURE: 100 MMHG | DIASTOLIC BLOOD PRESSURE: 45 MMHG

## 2020-09-06 VITALS — SYSTOLIC BLOOD PRESSURE: 98 MMHG | DIASTOLIC BLOOD PRESSURE: 45 MMHG

## 2020-09-06 VITALS — SYSTOLIC BLOOD PRESSURE: 119 MMHG | DIASTOLIC BLOOD PRESSURE: 73 MMHG

## 2020-09-06 VITALS — SYSTOLIC BLOOD PRESSURE: 108 MMHG | DIASTOLIC BLOOD PRESSURE: 47 MMHG

## 2020-09-06 VITALS — DIASTOLIC BLOOD PRESSURE: 47 MMHG | SYSTOLIC BLOOD PRESSURE: 103 MMHG

## 2020-09-06 VITALS — SYSTOLIC BLOOD PRESSURE: 96 MMHG | DIASTOLIC BLOOD PRESSURE: 44 MMHG

## 2020-09-06 VITALS — SYSTOLIC BLOOD PRESSURE: 97 MMHG | DIASTOLIC BLOOD PRESSURE: 48 MMHG

## 2020-09-06 VITALS — DIASTOLIC BLOOD PRESSURE: 46 MMHG | SYSTOLIC BLOOD PRESSURE: 97 MMHG

## 2020-09-06 VITALS — SYSTOLIC BLOOD PRESSURE: 89 MMHG | DIASTOLIC BLOOD PRESSURE: 40 MMHG

## 2020-09-06 VITALS — DIASTOLIC BLOOD PRESSURE: 92 MMHG | SYSTOLIC BLOOD PRESSURE: 107 MMHG

## 2020-09-06 VITALS — SYSTOLIC BLOOD PRESSURE: 94 MMHG | DIASTOLIC BLOOD PRESSURE: 43 MMHG

## 2020-09-06 VITALS — SYSTOLIC BLOOD PRESSURE: 108 MMHG | DIASTOLIC BLOOD PRESSURE: 46 MMHG

## 2020-09-06 VITALS — SYSTOLIC BLOOD PRESSURE: 99 MMHG | DIASTOLIC BLOOD PRESSURE: 44 MMHG

## 2020-09-06 VITALS — SYSTOLIC BLOOD PRESSURE: 104 MMHG | DIASTOLIC BLOOD PRESSURE: 45 MMHG

## 2020-09-06 VITALS — DIASTOLIC BLOOD PRESSURE: 51 MMHG | SYSTOLIC BLOOD PRESSURE: 101 MMHG

## 2020-09-06 VITALS — SYSTOLIC BLOOD PRESSURE: 99 MMHG | DIASTOLIC BLOOD PRESSURE: 50 MMHG

## 2020-09-06 VITALS — DIASTOLIC BLOOD PRESSURE: 45 MMHG | SYSTOLIC BLOOD PRESSURE: 98 MMHG

## 2020-09-06 LAB
ADD MANUAL DIFF: YES
ALBUMIN SERPL-MCNC: 1.4 G/DL (ref 3.4–5)
ALBUMIN/GLOB SERPL: 0.4 {RATIO} (ref 1–2.7)
ALP SERPL-CCNC: 61 U/L (ref 46–116)
ALT SERPL-CCNC: 86 U/L (ref 12–78)
ANION GAP SERPL CALC-SCNC: 11 MMOL/L (ref 5–15)
AST SERPL-CCNC: 83 U/L (ref 15–37)
BILIRUB SERPL-MCNC: 0.7 MG/DL (ref 0.2–1)
BUN SERPL-MCNC: 45 MG/DL (ref 7–18)
CALCIUM SERPL-MCNC: 8.3 MG/DL (ref 8.5–10.1)
CHLORIDE SERPL-SCNC: 117 MMOL/L (ref 98–107)
CO2 SERPL-SCNC: 22 MMOL/L (ref 21–32)
CREAT SERPL-MCNC: 1.4 MG/DL (ref 0.55–1.3)
ERYTHROCYTE [DISTWIDTH] IN BLOOD BY AUTOMATED COUNT: 14.2 % (ref 11.6–14.8)
GLOBULIN SER-MCNC: 3.6 G/DL
HCT VFR BLD CALC: 24.3 % (ref 37–47)
HGB BLD-MCNC: 8.3 G/DL (ref 12–16)
MCV RBC AUTO: 100 FL (ref 80–99)
PHOSPHATE SERPL-MCNC: 3.1 MG/DL (ref 2.5–4.9)
PLATELET # BLD: 58 K/UL (ref 150–450)
POTASSIUM SERPL-SCNC: 3.4 MMOL/L (ref 3.5–5.1)
RBC # BLD AUTO: 2.44 M/UL (ref 4.2–5.4)
SODIUM SERPL-SCNC: 150 MMOL/L (ref 136–145)
WBC # BLD AUTO: 14.9 K/UL (ref 4.8–10.8)

## 2020-09-06 RX ADMIN — HYDROCORTISONE SODIUM SUCCINATE SCH MG: 100 INJECTION, POWDER, FOR SOLUTION INTRAMUSCULAR; INTRAVENOUS at 13:41

## 2020-09-06 RX ADMIN — VALPROIC ACID SCH MG: 250 SOLUTION ORAL at 13:41

## 2020-09-06 RX ADMIN — VALPROIC ACID SCH MG: 250 SOLUTION ORAL at 19:46

## 2020-09-06 RX ADMIN — SODIUM CHLORIDE SCH MLS/HR: 900 INJECTION, SOLUTION INTRAVENOUS at 07:45

## 2020-09-06 RX ADMIN — MIDODRINE HYDROCHLORIDE SCH MG: 10 TABLET ORAL at 05:23

## 2020-09-06 RX ADMIN — MEROPENEM SCH MLS/HR: 1 INJECTION INTRAVENOUS at 03:02

## 2020-09-06 RX ADMIN — HYDROCORTISONE SODIUM SUCCINATE SCH MG: 100 INJECTION, POWDER, FOR SOLUTION INTRAMUSCULAR; INTRAVENOUS at 05:23

## 2020-09-06 RX ADMIN — MIDODRINE HYDROCHLORIDE SCH MG: 10 TABLET ORAL at 19:46

## 2020-09-06 RX ADMIN — MEROPENEM SCH MLS/HR: 1 INJECTION INTRAVENOUS at 11:29

## 2020-09-06 RX ADMIN — MEROPENEM SCH MLS/HR: 1 INJECTION INTRAVENOUS at 19:46

## 2020-09-06 RX ADMIN — MIDODRINE HYDROCHLORIDE SCH MG: 10 TABLET ORAL at 13:41

## 2020-09-06 RX ADMIN — VALPROIC ACID SCH MG: 250 SOLUTION ORAL at 05:23

## 2020-09-06 RX ADMIN — DOPAMINE HYDROCHLORIDE IN DEXTROSE SCH MLS/HR: 1.6 INJECTION, SOLUTION INTRAVENOUS at 19:47

## 2020-09-06 RX ADMIN — PANTOPRAZOLE SODIUM SCH MG: 40 INJECTION, POWDER, FOR SOLUTION INTRAVENOUS at 08:31

## 2020-09-06 RX ADMIN — HYDROCORTISONE SODIUM SUCCINATE SCH MG: 100 INJECTION, POWDER, FOR SOLUTION INTRAMUSCULAR; INTRAVENOUS at 19:46

## 2020-09-06 RX ADMIN — CHLORHEXIDINE GLUCONATE SCH APPLIC: 213 SOLUTION TOPICAL at 19:45

## 2020-09-06 NOTE — PULMONOLGY CRITICAL CARE NOTE
Critical Care - Asmt/Plan


Problems:  


(1) Acute respiratory failure


(2) Bacteremia


(3) Pneumonia


(4) Sepsis


(5) HCAP (healthcare-associated pneumonia)


(6) Seizure disorder


(7) Down's syndrome


Respiratory:  monitor respiratory rate, adjust FIO2, CXR


Cardiac:  stop pressors, continue to monitor HR/BP


Renal:  keep IV fluid, check electrolytes


Infectious Disease:  check cultures


Gastrointestinal:  continue feedings/current rate


Endocrine:  monitor blood sugar, check TSH


Hematologic:  monitor H/H


Neurologic:  PRN Morphine


Prophylaxis:  Protonix, Heparin


Notes Reviewed:  internist, cardio, renal


Discussed with:  nurses, consultants, 





Critical Care - Objective





Last 24 Hour Vital Signs








  Date Time  Temp Pulse Resp B/P (MAP) Pulse Ox O2 Delivery O2 Flow Rate FiO2


 


9/6/20 15:00    104/43    


 


9/6/20 15:00  63 26 107/92 (97) 100   


 


9/6/20 14:51  61 26     100


 


9/6/20 14:30  78 26 92/40 (57) 98   


 


9/6/20 14:00  71 26 96/41 (59) 99   


 


9/6/20 14:00    96/41    


 


9/6/20 13:30  72 27 91/44 (60) 100   


 


9/6/20 13:00    98/45    


 


9/6/20 13:00  68 24 98/45 (62) 100   


 


9/6/20 12:43  67 26     100


 


9/6/20 12:30  61 26 99/44 (62) 100   


 


9/6/20 12:00      Mechanical Ventilator  





      Mechanical Ventilator  


 


9/6/20 12:00        100


 


9/6/20 12:00    102/43    


 


9/6/20 12:00 98.4 70 25 102/43 (62) 100   


 


9/6/20 11:30  67 26 104/44 (64) 100   


 


9/6/20 11:01  63 26     100


 


9/6/20 11:00  61 26 107/47 (67) 100   


 


9/6/20 11:00    107/47    


 


9/6/20 10:30  61 26 96/44 (61) 100   


 


9/6/20 10:00    104/46    


 


9/6/20 10:00  59 26 104/46 (65) 100   


 


9/6/20 09:30  62 26 98/44 (62) 100   


 


9/6/20 09:00  59 26 102/46 (64) 100   


 


9/6/20 09:00    102/46    


 


9/6/20 08:30  59 26     100


 


9/6/20 08:30  59 26 101/51 (68) 100   


 


9/6/20 08:00 98.2 61 26 104/48 (66) 100   


 


9/6/20 08:00    104/48    


 


9/6/20 08:00      Mechanical Ventilator  





      Mechanical Ventilator  


 


9/6/20 08:00        100


 


9/6/20 07:45    99/45    


 


9/6/20 07:30  59 26 103/47 (65) 100   


 


9/6/20 07:11  59 26     100


 


9/6/20 07:00    99/45    


 


9/6/20 07:00  57 26 99/45 (63) 100   


 


9/6/20 06:30  58 26 119/73 (88) 100   


 


9/6/20 06:00  56 26 104/45 (64) 100   


 


9/6/20 05:30  61 26 97/46 (63) 100   


 


9/6/20 05:05  56 26     100


 


9/6/20 05:00  66 26 108/47 (67) 100   


 


9/6/20 05:00    108/47    


 


9/6/20 04:30  55 26 98/47 (64) 100   


 


9/6/20 04:00  55      


 


9/6/20 04:00        100


 


9/6/20 04:00 98.5 57 26 97/48 (64) 100   


 


9/6/20 04:00    97/48    


 


9/6/20 04:00      Mechanical Ventilator  





      Mechanical Ventilator  


 


9/6/20 03:30  64 25 109/50 (69) 100   


 


9/6/20 03:00  55 26 107/47 (67) 100   


 


9/6/20 03:00    107/47    


 


9/6/20 02:39  52 26     100


 


9/6/20 02:30  54 26 106/45 (65) 100   


 


9/6/20 02:00  57 26 105/49 (67) 100   


 


9/6/20 02:00    105/49    


 


9/6/20 01:30  52 26 105/47 (66) 100   


 


9/6/20 01:30  51 26     100


 


9/6/20 01:00    101/45    


 


9/6/20 01:00  57 26 101/45 (63) 100   


 


9/6/20 00:30  57 26 97/50 (66) 100   


 


9/6/20 00:19  58      


 


9/6/20 00:00    99/50    


 


9/6/20 00:00 98.2 63 21 99/50 (66) 100   


 


9/6/20 00:00      Mechanical Ventilator  





      Mechanical Ventilator  


 


9/6/20 00:00        100


 


9/5/20 23:30  66 26     100


 


9/5/20 23:30  84 20 87/57 (67) 97   


 


9/5/20 23:00    98/50    


 


9/5/20 23:00  56 25 98/50 (66) 100   


 


9/5/20 22:30  66 22 92/50 (64) 94   


 


9/5/20 22:00  57 26 91/48 (62) 100   


 


9/5/20 22:00    91/48    


 


9/5/20 21:30  59 26 94/45 (61) 100   


 


9/5/20 21:00    93/53    


 


9/5/20 21:00  61 26 93/53 (66) 100   


 


9/5/20 20:45  55 26     100


 


9/5/20 20:30  60 25 93/48 (63) 99   


 


9/5/20 20:00      Mechanical Ventilator  





      Mechanical Ventilator  


 


9/5/20 20:00 98.4 61 26 98/45 (62) 100   


 


9/5/20 20:00    98/45    


 


9/5/20 20:00        100


 


9/5/20 19:30  56 26     100


 


9/5/20 19:30  53 26 91/47 (62) 100   


 


9/5/20 19:14  52      


 


9/5/20 19:00    102/52    


 


9/5/20 19:00  53 26 102/52 (69) 100   


 


9/5/20 18:28  59 26 103/55 (71) 100   


 


9/5/20 18:00  54 26 101/45 (63) 100   


 


9/5/20 18:00    101/45    


 


9/5/20 17:30  55 26 93/48 (63) 100   


 


9/5/20 17:00    93/48    


 


9/5/20 17:00  56 26 89/45 (60) 100   


 


9/5/20 16:41  60 26     100


 


9/5/20 16:30  65 26 93/44 (60) 100   








Status:  sedated


Condition:  critical


HEENT:  atraumatic


Neck:  full ROM


Heart:  HR/BP stable, HR/BP unstable


Abdomen:  non-tender


Accucheck:  131





Critical Care - Subjective


ROS Limited/Unobtainable:  Yes


Condition:  critical


EKG Rhythm:  Sinus Rhythm


FI02:  100


Vent Support Breath Rate:  26


Vent Support Mode:  AC


Vent Tidal Volume:  500


Sputum Amount:  Scant


PEEP:  10.0


PIP:  39


Tube Feeding Amount:  50


I&O:











Intake and Output  


 


 9/5/20 9/6/20





 19:00 07:00


 


Intake Total 751.91602 ml 863.275 ml


 


Output Total 390 ml 685 ml


 


Balance 361.67939 ml 178.275 ml


 


  


 


Free Water  120 ml


 


IV Total 251.70646 ml 143.275 ml


 


Tube Feeding 500 ml 600 ml


 


Output Urine Total 390 ml 685 ml


 


# Bowel Movements 1 1








ET-Tube:  7.5


ET Position:  22


Labs:





Laboratory Tests








Test


  9/6/20


04:00


 


White Blood Count


  14.9 K/UL


(4.8-10.8)  H


 


Red Blood Count


  2.44 M/UL


(4.20-5.40)  L


 


Hemoglobin


  8.3 G/DL


(12.0-16.0)  L


 


Hematocrit


  24.3 %


(37.0-47.0)  L


 


Mean Corpuscular Volume


  100 FL (80-99)


H


 


Mean Corpuscular Hemoglobin


  34.0 PG


(27.0-31.0)  H


 


Mean Corpuscular Hemoglobin


Concent 34.1 G/DL


(32.0-36.0)


 


Red Cell Distribution Width


  14.2 %


(11.6-14.8)


 


Platelet Count


  58 K/UL


(150-450)  L


 


Mean Platelet Volume


  8.0 FL


(6.5-10.1)


 


Neutrophils (%) (Auto)


  % (45.0-75.0)


 


 


Lymphocytes (%) (Auto)


  % (20.0-45.0)


 


 


Monocytes (%) (Auto)  % (1.0-10.0)  


 


Eosinophils (%) (Auto)  % (0.0-3.0)  


 


Basophils (%) (Auto)  % (0.0-2.0)  


 


Differential Total Cells


Counted 100  


 


 


Neutrophils % (Manual) 80 % (45-75)  H


 


Lymphocytes % (Manual) 10 % (20-45)  L


 


Monocytes % (Manual) 10 % (1-10)  


 


Eosinophils % (Manual) 0 % (0-3)  


 


Basophils % (Manual) 0 % (0-2)  


 


Band Neutrophils 0 % (0-8)  


 


Platelet Estimate Decreased  L


 


Platelet Morphology Normal  


 


Hypochromasia 2+  


 


Anisocytosis 1+  


 


Macrocytosis 1+  


 


Sodium Level


  150 MMOL/L


(136-145)  H


 


Potassium Level


  3.4 MMOL/L


(3.5-5.1)  L


 


Chloride Level


  117 MMOL/L


()  H


 


Carbon Dioxide Level


  22 MMOL/L


(21-32)


 


Anion Gap


  11 mmol/L


(5-15)


 


Blood Urea Nitrogen


  45 mg/dL


(7-18)  H


 


Creatinine


  1.4 MG/DL


(0.55-1.30)  H


 


Estimat Glomerular Filtration


Rate 38.7 mL/min


(>60)


 


Glucose Level


  147 MG/DL


()  H


 


Calcium Level


  8.3 MG/DL


(8.5-10.1)  L


 


Phosphorus Level


  3.1 MG/DL


(2.5-4.9)


 


Magnesium Level


  2.1 MG/DL


(1.8-2.4)


 


Total Bilirubin


  0.7 MG/DL


(0.2-1.0)


 


Aspartate Amino Transf


(AST/SGOT) 83 U/L (15-37)


H


 


Alanine Aminotransferase


(ALT/SGPT) 86 U/L (12-78)


H


 


Alkaline Phosphatase


  61 U/L


()


 


C-Reactive Protein,


Quantitative 1.3 mg/dL


(0.00-0.90)  H


 


Pro-B-Type Natriuretic Peptide


  6073 pg/mL


(0-125)  H


 


Total Protein


  5.0 G/DL


(6.4-8.2)  L


 


Albumin


  1.4 G/DL


(3.4-5.0)  L


 


Globulin 3.6 g/dL  


 


Albumin/Globulin Ratio


  0.4 (1.0-2.7)


L

















Chano Cherry MD Sep 6, 2020 16:19

## 2020-09-06 NOTE — NUR
NURSE NOTES:

Bedside assessment performed, assessed pt with a FLACC scale of 0 noted. Pt observed to be 
resting comfortably in bed. VS obtained and remain stable at this time. Pt provided with a 
partial bed bath and oral care. Dopamine continues to infuse at 2 mcg/kg/min with no adverse 
effects noted. Respiratory status remains stable with no s/sx of acute distress noted, 
consistent with ventilator settings at this time. Pt remains clean and dry. Pt repositioned 
for safety and comfort. Fall, Aspiration, Seizure and Skin precautions observed. Pt remains 
resting in bed; Bed remains in the lowest position with the safety wheels engaged, call 
light within reach, side rails up x3 and bed alarm activated. Will continue plan of care. 
Will continue to monitor.

## 2020-09-06 NOTE — NUR
NURSE HAND-OFF REPORT: 



Latest Vital Signs: Temperature 98.5 , Pulse 58 , B/P 119 /73 , Respiratory Rate 26 , O2 SAT 
100 , Mechanical Ventilator, O2 Flow Rate 15.0 .  

Vital Sign Comment: -



EKG Rhythm: Sinus Bradycardia

Rhythm change?: N 

MD Notified?: -

MD Response: N/A



Latest Momin Fall Score: 50  

Fall Risk: High Risk 

Safety Measures: Call light Within Reach, Bed Alarm Zone 2, Side Rails Side Rails x3, Bed 
position Low and Locked.

Fall Precautions: 

Door Sign

Patient Fall Education



Report given to LAYTON Plata. Endorsed plan of care.

## 2020-09-06 NOTE — NUR
NURSE NOTES:

Bedside assessment performed, assessed pt with a FLACC scale of 0 noted. Pt observed to be 
resting comfortably in bed. VS obtained and remain stable at this time. Dopamine continues 
to infuse at 2 mcg/kg/min with no adverse effects noted. Respiratory status remains stable 
with no s/sx of acute distress noted, consistent with ventilator settings at this time. 
Neuro assessment remains consistent with previous physical assessment, no s/sx of seizures 
noted. Pt remains clean and dry. Pt repositioned for safety and comfort. Fall, Aspiration, 
Seizure and Skin precautions observed. Pt remains resting in bed; Bed remains in the lowest 
position with the safety wheels engaged, call light within reach, side rails up x3 and bed 
alarm activated. Will continue plan of care. Will continue to monitor.

## 2020-09-06 NOTE — NUR
NURSE NOTES:

Received patient and report from LAYTON Martins. Patient is observed resting in bed and noted to 
be lethargic but responsive to stimuli; withdraws to pain, opens eyes spontaneously, SMITH  
and noted to be tracking. Pt is currently able to follow very simple commands. No pain noted 
upon assessment. Pt is currently orally intubated ett size 7.5 noted to be 22 @ lip. Vent 
settings as follows: AC 26  FiO2 100% PEEP 10 with an O2 saturation of 100% noted at 
this time. No s/sx of acute respiratory distress noted at this time, pt tolerating current 
vent settings well. Bilateral lower lobe breath sounds noted to be diminished upon 
auscultation and rhonchi noted in bilateral upper and lower lobes. Pt noted to be SR on tele 
monitor with a HR of 62 with no s/sx of acute cardiac distress noted. Left upper arm PICC 
line noted which remains asymptomatic, intact and patent. Central line dressing remains 
clean ,dry and intact. Dopamine currently infusing at 2 mcg/kg/min. No s/sx of adverse 
effects noted at this time from medication administration. G tube noted which remains intact 
and patent. Jevity 1.2 infusing at 50mL/hr as prescribed with no residual noted at this 
time. Active bowel sounds noted in all four quadrants, abdomen remains large, soft and 
round. Diagnostics reviewed at bedside. Skin alterations noted. Pt repositioned for comfort 
and safety. Fall, Aspiration, Seizure and Skin precautions observed. Pt remains resting in 
bed; Bed remains in the lowest position with the safety wheels engaged, call light within 
reach, side rails up x3 and bed alarm activated. Will continue plan of care. Will continue 
to monitor.

## 2020-09-06 NOTE — NUR
NURSE NOTES:

Seen by Dr. Mcmahon and assessed patient. He said he ordered blood culture. Will follow 
up.

## 2020-09-06 NOTE — NUR
NURSE NOTES:

Bed bath given. Kept dry, clean and comfortable. Still on dopamine @ 2mcg/kg/min. SBP 100s 
on the monitor. Will continue plan of care.

## 2020-09-06 NOTE — NUR
NURSE NOTES:

Bedside assessment performed, assessed pt with a FLACC scale of 0 noted. VS obtained and 
remain stable at this time. Dopamine decreased and now at 1 mcg/kg/min with no adverse 
effects noted. All due meds given, water flush given about 60ml.  SBP remains between  
at this time. Will continue to monitor and titrate accordingly. Fio2 brouhght down to 85%. 
Respiratory status remains stable with no s/sx of acute distress noted. Pt tolerating 
current vent settings well. Neuro assessment remains consistent with previous physical 
assessment, no s/sx of seizures noted. Pt remains clean and dry. Pt repositioned for safety 
and comfort. Fall, Aspiration, Seizure and Skin precautions observed. Pt remains resting in 
bed; Bed remains in the lowest position with the safety wheels engaged, call light within 
reach, side rails up x3 and bed alarm activated. Will continue plan of care. Will continue 
to monitor.

## 2020-09-06 NOTE — NUR
NURSE NOTES:

Received report from LAYTON Ashford. Patient asleep and responsive to shaking. ETT 7.5/22cm at 
lip line with vent setting AC 26, , FiO2 100% and Peep 10. O2 sat 100% on the monitor. 
Gtube intact and running with Jevity 1.2 @50ml/hr. Valle intact, patent and draining with 
yellow color urine. Left upper arm PICC intact and running Dopamine 2mcg/kg/min. Kept dry, 
clean, comfortable and HOB>30. Will continue plan of care.

## 2020-09-06 NOTE — NEPHROLOGY PROGRESS NOTE
Assessment/Plan


Problem List:  


(1) DAVID (acute kidney injury)


(2) Respiratory failure requiring intubation


(3) Down's syndrome


(4) Seizure disorder


(5) Hypothyroidism


Assessment





Acute renal failure, likely due to hypotension


Acute respiratory distress, hypoxia


Seizure disorder


Hypothyroidism


Down syndrome


Full code











Fluid challenge with IV fluids and albumin


Midodrine for BP above 100 systolic


Check TSH level


Check


Correct level


Monitor renal parameters


Urine studies


Per orders


Plan


September 6: Electrolyte abnormalities addressed.  Serum creatinine lower.  

Continue per current management.


September 5: Status unchanged.  Lab reviewed.  Serum potassium 2.7.  IV 

potassium chloride ordered.  Serum creatinine low at 1.6 stable.  Blood 

pressure 90s systolic


September 4: Status quo.  Labs reviewed.  Renal parameters stable.  Serum 

creatinine down to 1.6.  Medication list reviewed.  Continues to be on 

midodrine.  Continue per consultants.


September 3: Status quo.  Labs reviewed.  Electrolytes adjusted.  Serum 

creatinine down to 1.8.  Continue per consultants.


September 2: Status quo.  Labs reviewed.  Phosphorus supplement IV given.  

Serum creatinine 2.  Continue per consultants.


September 1: Requires less pressors.  Albumin bolus given.  1 dose of Lasix IV 

ordered as the patient severely edematous.  Patient serum albumin is very low.  

Continue per consultants.


August 31: Continues to be intubated.  Labs reviewed.  Serum creatinine 1.9 

unchanged.  Blood pressure more stable.  Off 1 of the pressors.  Continue to 

monitor renal parameters.  Continue per consultants.  Patient now on 

hydrocortisone 100 mg every 8 hours.  Will decrease IV fluid.  Normal saline 

down to 50 cc an hour.


August 30: Intubated.  Labs reviewed.  Creatinine 1.9 unchanged.  Continue same 

treatment plan.  Per consultants.  Overall poor prognosis since the patient 

remains on pressors and her pulmonary status is worsening.


August 29: Remains intubated.  Labs reviewed.  Creatinine 1.9.  Blood pressure 

systolic 90s.  Continue per consultants.


August 28: Remains intubated.  Labs reviewed.  Serum creatinine lower to 2.  

Vancomycin level lower.  Remains hypotensive on pressors.  Will increase 

midodrine to 10 mg every 8 hours.  Continue per consultants.  Continue to 

monitor renal parameters.


August 27: Patient now in ICU.  Intubated.  On pressors.  Labs reviewed.  Will 

increase midodrine.  Aim to keep blood pressure over 100 systolic.  Will give 

albumin bolus.  Will check vancomycin level which was elevated when checked 

previously on August 24.  Will monitor renal parameters.  Continue per 

consultants.





Subjective


ROS Limited/Unobtainable:  Yes





Objective


Objective





Last 24 Hour Vital Signs








  Date Time  Temp Pulse Resp B/P (MAP) Pulse Ox O2 Delivery O2 Flow Rate FiO2


 


9/6/20 11:30  67 26 104/44 (64) 100   


 


9/6/20 11:01  63 26     100


 


9/6/20 11:00  61 26 107/47 (67) 100   


 


9/6/20 10:30  61 26 96/44 (61) 100   


 


9/6/20 10:00  59 26 104/46 (65) 100   


 


9/6/20 09:30  62 26 98/44 (62) 100   


 


9/6/20 09:00  59 26 102/46 (64) 100   


 


9/6/20 08:30  59 26     100


 


9/6/20 08:30  59 26 101/51 (68) 100   


 


9/6/20 08:00 98.2 61 26 104/48 (66) 100   


 


9/6/20 08:00      Mechanical Ventilator  





      Mechanical Ventilator  


 


9/6/20 08:00        100


 


9/6/20 07:45    99/45    


 


9/6/20 07:30  59 26 103/47 (65) 100   


 


9/6/20 07:11  59 26     100


 


9/6/20 07:00    99/45    


 


9/6/20 07:00  57 26 99/45 (63) 100   


 


9/6/20 06:30  58 26 119/73 (88) 100   


 


9/6/20 06:00  56 26 104/45 (64) 100   


 


9/6/20 05:30  61 26 97/46 (63) 100   


 


9/6/20 05:05  56 26     100


 


9/6/20 05:00  66 26 108/47 (67) 100   


 


9/6/20 05:00    108/47    


 


9/6/20 04:30  55 26 98/47 (64) 100   


 


9/6/20 04:00  55      


 


9/6/20 04:00        100


 


9/6/20 04:00 98.5 57 26 97/48 (64) 100   


 


9/6/20 04:00    97/48    


 


9/6/20 04:00      Mechanical Ventilator  





      Mechanical Ventilator  


 


9/6/20 03:30  64 25 109/50 (69) 100   


 


9/6/20 03:00  55 26 107/47 (67) 100   


 


9/6/20 03:00    107/47    


 


9/6/20 02:39  52 26     100


 


9/6/20 02:30  54 26 106/45 (65) 100   


 


9/6/20 02:00  57 26 105/49 (67) 100   


 


9/6/20 02:00    105/49    


 


9/6/20 01:30  52 26 105/47 (66) 100   


 


9/6/20 01:30  51 26     100


 


9/6/20 01:00    101/45    


 


9/6/20 01:00  57 26 101/45 (63) 100   


 


9/6/20 00:30  57 26 97/50 (66) 100   


 


9/6/20 00:19  58      


 


9/6/20 00:00    99/50    


 


9/6/20 00:00 98.2 63 21 99/50 (66) 100   


 


9/6/20 00:00      Mechanical Ventilator  





      Mechanical Ventilator  


 


9/6/20 00:00        100


 


9/5/20 23:30  66 26     100


 


9/5/20 23:30  84 20 87/57 (67) 97   


 


9/5/20 23:00    98/50    


 


9/5/20 23:00  56 25 98/50 (66) 100   


 


9/5/20 22:30  66 22 92/50 (64) 94   


 


9/5/20 22:00  57 26 91/48 (62) 100   


 


9/5/20 22:00    91/48    


 


9/5/20 21:30  59 26 94/45 (61) 100   


 


9/5/20 21:00    93/53    


 


9/5/20 21:00  61 26 93/53 (66) 100   


 


9/5/20 20:45  55 26     100


 


9/5/20 20:30  60 25 93/48 (63) 99   


 


9/5/20 20:00      Mechanical Ventilator  





      Mechanical Ventilator  


 


9/5/20 20:00 98.4 61 26 98/45 (62) 100   


 


9/5/20 20:00    98/45    


 


9/5/20 20:00        100


 


9/5/20 19:30  56 26     100


 


9/5/20 19:30  53 26 91/47 (62) 100   


 


9/5/20 19:14  52      


 


9/5/20 19:00    102/52    


 


9/5/20 19:00  53 26 102/52 (69) 100   


 


9/5/20 18:28  59 26 103/55 (71) 100   


 


9/5/20 18:00  54 26 101/45 (63) 100   


 


9/5/20 18:00    101/45    


 


9/5/20 17:30  55 26 93/48 (63) 100   


 


9/5/20 17:00    93/48    


 


9/5/20 17:00  56 26 89/45 (60) 100   


 


9/5/20 16:41  60 26     100


 


9/5/20 16:30  65 26 93/44 (60) 100   


 


9/5/20 16:00        100


 


9/5/20 16:00      Mechanical Ventilator  





      Mechanical Ventilator  


 


9/5/20 16:00  63      


 


9/5/20 16:00    89/45    


 


9/5/20 16:00 99.2 64 26 84/44 (57) 100   


 


9/5/20 15:30  63 26 119/60 (79) 100   


 


9/5/20 15:00  72 26 93/40 (57) 100   


 


9/5/20 15:00    93/40    


 


9/5/20 14:30  61 26     100


 


9/5/20 14:30  68 26 99/38 (58) 100   


 


9/5/20 14:00    96/41    


 


9/5/20 14:00    99/38    


 


9/5/20 14:00  66 26 96/41 (59) 100   


 


9/5/20 13:30  64 26 92/42 (59) 100   


 


9/5/20 13:00    98/44    


 


9/5/20 13:00  57 26 98/44 (62) 100   


 


9/5/20 12:52  54 26     100


 


9/5/20 12:30  55 26 89/49 (62) 100   

















Intake and Output  


 


 9/5/20 9/6/20





 19:00 07:00


 


Intake Total 751.32632 ml 863.275 ml


 


Output Total 390 ml 685 ml


 


Balance 361.91945 ml 178.275 ml


 


  


 


Free Water  120 ml


 


IV Total 251.90625 ml 143.275 ml


 


Tube Feeding 500 ml 600 ml


 


Output Urine Total 390 ml 685 ml


 


# Bowel Movements 1 1








Laboratory Tests


9/6/20 04:00: 


White Blood Count 14.9H, Red Blood Count 2.44L, Hemoglobin 8.3L, Hematocrit 

24.3L, Mean Corpuscular Volume 100H, Mean Corpuscular Hemoglobin 34.0H, Mean 

Corpuscular Hemoglobin Concent 34.1, Red Cell Distribution Width 14.2, Platelet 

Count 58L, Mean Platelet Volume 8.0, Neutrophils (%) (Auto) , Lymphocytes (%) (

Auto) , Monocytes (%) (Auto) , Eosinophils (%) (Auto) , Basophils (%) (Auto) , 

Differential Total Cells Counted 100, Neutrophils % (Manual) 80H, Lymphocytes % 

(Manual) 10L, Monocytes % (Manual) 10, Eosinophils % (Manual) 0, Basophils % (

Manual) 0, Band Neutrophils 0, Platelet Estimate DecreasedL, Platelet 

Morphology Normal, Hypochromasia 2+, Anisocytosis 1+, Macrocytosis 1+, Sodium 

Level 150H, Potassium Level 3.4L, Chloride Level 117H, Carbon Dioxide Level 22, 

Anion Gap 11, Blood Urea Nitrogen 45H, Creatinine 1.4H, Estimat Glomerular 

Filtration Rate 38.7, Glucose Level 147H, Calcium Level 8.3L, Phosphorus Level 

3.1, Magnesium Level 2.1, Total Bilirubin 0.7, Aspartate Amino Transf (AST/SGOT

) 83H, Alanine Aminotransferase (ALT/SGPT) 86H, Alkaline Phosphatase 61, C-

Reactive Protein, Quantitative 1.3H, Pro-B-Type Natriuretic Peptide 6073H, 

Total Protein 5.0L, Albumin 1.4L, Globulin 3.6, Albumin/Globulin Ratio 0.4L


Height (Feet):  5


Height (Inches):  3.00


Weight (Pounds):  172


General Appearance:  no apparent distress


EENT:  other - Connected to ventilator


Cardiovascular:  normal rate - Rate 60s


Respiratory/Chest:  decreased breath sounds


Abdomen:  distended











Lam Nino MD Sep 6, 2020 12:10

## 2020-09-06 NOTE — CARDIOLOGY PROGRESS NOTE
Assessment/Plan


Assessment/Plan


pneumonia, severe hypotension,on pressors, attempt to wean off





Subjective


Subjective


The patient is more alert today and looking around





Objective





Last 24 Hour Vital Signs








  Date Time  Temp Pulse Resp B/P (MAP) Pulse Ox O2 Delivery O2 Flow Rate FiO2


 


9/6/20 19:00    102/51    


 


9/6/20 19:00  62 26 102/51 (68) 100   


 


9/6/20 18:30  68 26 114/42 (66) 100   


 


9/6/20 18:00  72 25 96/44 (61) 100   


 


9/6/20 18:00    96/44    


 


9/6/20 17:30  68 25 100/61 (74) 100   


 


9/6/20 17:00    94/43    


 


9/6/20 17:00  65 26 94/43 (60) 100   


 


9/6/20 16:30  68 26     100


 


9/6/20 16:30  62 26 100/45 (63) 100   


 


9/6/20 16:00        100


 


9/6/20 16:00  62      


 


9/6/20 16:00 98.3 68 26 98/44 (62) 97   


 


9/6/20 16:00      Mechanical Ventilator  





      Mechanical Ventilator  


 


9/6/20 16:00    98/44    


 


9/6/20 15:30  62 26 99/44 (62) 98   


 


9/6/20 15:00    104/43    


 


9/6/20 15:00  63 26 107/92 (97) 100   


 


9/6/20 14:51  61 26     100


 


9/6/20 14:30  78 26 92/40 (57) 98   


 


9/6/20 14:00  71 26 96/41 (59) 99   


 


9/6/20 14:00    96/41    


 


9/6/20 13:30  72 27 91/44 (60) 100   


 


9/6/20 13:00    98/45    


 


9/6/20 13:00  68 24 98/45 (62) 100   


 


9/6/20 12:43  67 26     100


 


9/6/20 12:30  61 26 99/44 (62) 100   


 


9/6/20 12:00      Mechanical Ventilator  





      Mechanical Ventilator  


 


9/6/20 12:00        100


 


9/6/20 12:00    102/43    


 


9/6/20 12:00 98.4 70 25 102/43 (62) 100   


 


9/6/20 12:00  61      


 


9/6/20 11:30  67 26 104/44 (64) 100   


 


9/6/20 11:01  63 26     100


 


9/6/20 11:00  61 26 107/47 (67) 100   


 


9/6/20 11:00    107/47    


 


9/6/20 10:30  61 26 96/44 (61) 100   


 


9/6/20 10:00    104/46    


 


9/6/20 10:00  59 26 104/46 (65) 100   


 


9/6/20 09:30  62 26 98/44 (62) 100   


 


9/6/20 09:00  59 26 102/46 (64) 100   


 


9/6/20 09:00    102/46    


 


9/6/20 08:30  59 26     100


 


9/6/20 08:30  59 26 101/51 (68) 100   


 


9/6/20 08:00 98.2 61 26 104/48 (66) 100   


 


9/6/20 08:00    104/48    


 


9/6/20 08:00  66      


 


9/6/20 08:00      Mechanical Ventilator  





      Mechanical Ventilator  


 


9/6/20 08:00        100


 


9/6/20 07:45    99/45    


 


9/6/20 07:30  59 26 103/47 (65) 100   


 


9/6/20 07:11  59 26     100


 


9/6/20 07:00    99/45    


 


9/6/20 07:00  57 26 99/45 (63) 100   


 


9/6/20 06:30  58 26 119/73 (88) 100   


 


9/6/20 06:00  56 26 104/45 (64) 100   


 


9/6/20 05:30  61 26 97/46 (63) 100   


 


9/6/20 05:05  56 26     100


 


9/6/20 05:00  66 26 108/47 (67) 100   


 


9/6/20 05:00    108/47    


 


9/6/20 04:30  55 26 98/47 (64) 100   


 


9/6/20 04:00  55      


 


9/6/20 04:00        100


 


9/6/20 04:00 98.5 57 26 97/48 (64) 100   


 


9/6/20 04:00    97/48    


 


9/6/20 04:00      Mechanical Ventilator  





      Mechanical Ventilator  


 


9/6/20 03:30  64 25 109/50 (69) 100   


 


9/6/20 03:00  55 26 107/47 (67) 100   


 


9/6/20 03:00    107/47    


 


9/6/20 02:39  52 26     100


 


9/6/20 02:30  54 26 106/45 (65) 100   


 


9/6/20 02:00  57 26 105/49 (67) 100   


 


9/6/20 02:00    105/49    


 


9/6/20 01:30  52 26 105/47 (66) 100   


 


9/6/20 01:30  51 26     100


 


9/6/20 01:00    101/45    


 


9/6/20 01:00  57 26 101/45 (63) 100   


 


9/6/20 00:30  57 26 97/50 (66) 100   


 


9/6/20 00:19  58      


 


9/6/20 00:00    99/50    


 


9/6/20 00:00 98.2 63 21 99/50 (66) 100   


 


9/6/20 00:00      Mechanical Ventilator  





      Mechanical Ventilator  


 


9/6/20 00:00        100


 


9/5/20 23:30  66 26     100


 


9/5/20 23:30  84 20 87/57 (67) 97   


 


9/5/20 23:00    98/50    


 


9/5/20 23:00  56 25 98/50 (66) 100   


 


9/5/20 22:30  66 22 92/50 (64) 94   


 


9/5/20 22:00  57 26 91/48 (62) 100   


 


9/5/20 22:00    91/48    


 


9/5/20 21:30  59 26 94/45 (61) 100   


 


9/5/20 21:00    93/53    


 


9/5/20 21:00  61 26 93/53 (66) 100   


 


9/5/20 20:45  55 26     100


 


9/5/20 20:30  60 25 93/48 (63) 99   


 


9/5/20 20:00      Mechanical Ventilator  





      Mechanical Ventilator  


 


9/5/20 20:00 98.4 61 26 98/45 (62) 100   


 


9/5/20 20:00    98/45    


 


9/5/20 20:00        100








General Appearance:  alert, on vent


EENT:  PERRL/EOMI


Rhythm:  SB


Cardiovascular:  normal rate, bradycardia


Respiratory/Chest:  crackles/rales


Abdomen:  soft


Extremities:  other -  generalzied edema











Intake and Output  


 


 9/5/20 9/6/20





 19:00 07:00


 


Intake Total 751.69241 ml 863.275 ml


 


Output Total 390 ml 685 ml


 


Balance 361.05621 ml 178.275 ml


 


  


 


Free Water  120 ml


 


IV Total 251.95052 ml 143.275 ml


 


Tube Feeding 500 ml 600 ml


 


Output Urine Total 390 ml 685 ml


 


# Bowel Movements 1 1











Laboratory Tests








Test


  9/6/20


04:00


 


White Blood Count


  14.9 K/UL


(4.8-10.8)  H


 


Red Blood Count


  2.44 M/UL


(4.20-5.40)  L


 


Hemoglobin


  8.3 G/DL


(12.0-16.0)  L


 


Hematocrit


  24.3 %


(37.0-47.0)  L


 


Mean Corpuscular Volume


  100 FL (80-99)


H


 


Mean Corpuscular Hemoglobin


  34.0 PG


(27.0-31.0)  H


 


Mean Corpuscular Hemoglobin


Concent 34.1 G/DL


(32.0-36.0)


 


Red Cell Distribution Width


  14.2 %


(11.6-14.8)


 


Platelet Count


  58 K/UL


(150-450)  L


 


Mean Platelet Volume


  8.0 FL


(6.5-10.1)


 


Neutrophils (%) (Auto)


  % (45.0-75.0)


 


 


Lymphocytes (%) (Auto)


  % (20.0-45.0)


 


 


Monocytes (%) (Auto)  % (1.0-10.0)  


 


Eosinophils (%) (Auto)  % (0.0-3.0)  


 


Basophils (%) (Auto)  % (0.0-2.0)  


 


Differential Total Cells


Counted 100  


 


 


Neutrophils % (Manual) 80 % (45-75)  H


 


Lymphocytes % (Manual) 10 % (20-45)  L


 


Monocytes % (Manual) 10 % (1-10)  


 


Eosinophils % (Manual) 0 % (0-3)  


 


Basophils % (Manual) 0 % (0-2)  


 


Band Neutrophils 0 % (0-8)  


 


Platelet Estimate Decreased  L


 


Platelet Morphology Normal  


 


Hypochromasia 2+  


 


Anisocytosis 1+  


 


Macrocytosis 1+  


 


Sodium Level


  150 MMOL/L


(136-145)  H


 


Potassium Level


  3.4 MMOL/L


(3.5-5.1)  L


 


Chloride Level


  117 MMOL/L


()  H


 


Carbon Dioxide Level


  22 MMOL/L


(21-32)


 


Anion Gap


  11 mmol/L


(5-15)


 


Blood Urea Nitrogen


  45 mg/dL


(7-18)  H


 


Creatinine


  1.4 MG/DL


(0.55-1.30)  H


 


Estimat Glomerular Filtration


Rate 38.7 mL/min


(>60)


 


Glucose Level


  147 MG/DL


()  H


 


Calcium Level


  8.3 MG/DL


(8.5-10.1)  L


 


Phosphorus Level


  3.1 MG/DL


(2.5-4.9)


 


Magnesium Level


  2.1 MG/DL


(1.8-2.4)


 


Total Bilirubin


  0.7 MG/DL


(0.2-1.0)


 


Aspartate Amino Transf


(AST/SGOT) 83 U/L (15-37)


H


 


Alanine Aminotransferase


(ALT/SGPT) 86 U/L (12-78)


H


 


Alkaline Phosphatase


  61 U/L


()


 


C-Reactive Protein,


Quantitative 1.3 mg/dL


(0.00-0.90)  H


 


Pro-B-Type Natriuretic Peptide


  6073 pg/mL


(0-125)  H


 


Total Protein


  5.0 G/DL


(6.4-8.2)  L


 


Albumin


  1.4 G/DL


(3.4-5.0)  L


 


Globulin 3.6 g/dL  


 


Albumin/Globulin Ratio


  0.4 (1.0-2.7)


L

















Verito Barnett MD Sep 6, 2020 19:47

## 2020-09-06 NOTE — INFECTIOUS DISEASES PROG NOTE
Assessment/Plan








ASSESSMENT:


sp code blue 8/26





Septic Shock; SP


Fever, SP


Leukocytosis; SP


  -8/26 u/a no pyuria





Pneumonia.- COVID 19 neg x3


   Acute hypoxic resp failure on VM> NRB 15l 100%; hypoxic on ABG> now VDRF 8/26

- Fio2 80% >100% 8/28> 60% 9/1 >80% 9/2


         -9/1 sp cx Neg


         8/31 CXR: Extensive bilateral interstitial and airspace disease 

appears similar to the prior exam. Moderate to large bilateral pleural 

effusions appear unchanged.


   8/28 CXR: Increasing left upper lobe dense consolidation and likely 

increasing bilateral pleural fluid. Persistent diffuse dense consolidation 

elsewhere


            -8/26 sp cx normal resp lilliam


             -8/25 CXR: Increased atelectasis of the right lung, since prior 

exam of 3 days earlier. New or increased right pleural effusion. Increased left 

basilar consolidation and/or pleural fluid 


          -COVID Rapid PCR neg 8/23, 8/23, 8/26


          -8/22 spc x Group G strep


          -8/22 CXR:   Reduced lung volumes.  Patchy bilateral predominantly 

interstitial  pulmonary opacities.  Could be from edema and/or pneumonia.  

There is a broader differential.


 


        -legionella ag urine, blasto ab, Histo ab, HIV ab screen neg





Persistent, high grade bacteremia-


     -8/22 Bcx 4/4 sets S. haemolyticus; 8/23 Bcx 3/4 S/ epi; 8/27 Bcx 1.4 S. 

warnerri; 8/29 Bcx Neg


     -2d echo: no vegetaions seen





          ua/ wbc 10-15, nit neg, leuk +1; ucx Neg





DAVID; 


 -supratherapeutic vanco levels





-Seizure disorder.


- Hypothyroidism.


- Down syndrome.





History of PEG tube placement.


[facility] resident





PLAN:





-repeat blood Cx





Cont Meropenem#12/10-14 (abx d #16) given resp decompensation


   -9/2 SP MIcafungin #7, Linezolid #5





f/u Repeat BCx given persistent bacteremia


CT chest when stable enough, not currently given on FiO2 100% and pressors


If pleural effusions, should be tapped to r/o empyema, send for bacterial cx as 

well as cell count and diff


F/u cx of exudate around G-tube


Trend GIB (black stools)





          9/4/20 SP Daptomycin #11


   8/28 SP Azithromycin #7/7


   8/26 SP Ceftriaxone #2


   8/25 SP IV Vancomycin #4, Zosyn #4


   8/22 SP Cefepime x1, Flagyl x1





- Monitor CBC, BMP.


-f/u Cocci ab, CrAg


.f/u  Repeat cx


- COVID neg x3


- Monitor chest x-ray.


- Monitor the patient's clinical course and labs.  Based on those, we will do 

further recommendation.





Thank you, Dr. Cherry, for allowing me to participate in the care of


this patient.  I will follow the patient with you at this


hospitalization.








Discussed with RN





Subjective


Allergies:  


Coded Allergies:  


     No Known Allergies (Unverified , 1/8/19)





Objective





Last 24 Hour Vital Signs








  Date Time  Temp Pulse Resp B/P (MAP) Pulse Ox O2 Delivery O2 Flow Rate FiO2


 


9/6/20 11:30  67 26 104/44 (64) 100   


 


9/6/20 11:01  63 26     100


 


9/6/20 11:00  61 26 107/47 (67) 100   


 


9/6/20 10:30  61 26 96/44 (61) 100   


 


9/6/20 10:00  59 26 104/46 (65) 100   


 


9/6/20 09:30  62 26 98/44 (62) 100   


 


9/6/20 09:00  59 26 102/46 (64) 100   


 


9/6/20 08:30  59 26     100


 


9/6/20 08:30  59 26 101/51 (68) 100   


 


9/6/20 08:00 98.2 61 26 104/48 (66) 100   


 


9/6/20 08:00      Mechanical Ventilator  





      Mechanical Ventilator  


 


9/6/20 08:00        100


 


9/6/20 07:45    99/45    


 


9/6/20 07:30  59 26 103/47 (65) 100   


 


9/6/20 07:11  59 26     100


 


9/6/20 07:00    99/45    


 


9/6/20 07:00  57 26 99/45 (63) 100   


 


9/6/20 06:30  58 26 119/73 (88) 100   


 


9/6/20 06:00  56 26 104/45 (64) 100   


 


9/6/20 05:30  61 26 97/46 (63) 100   


 


9/6/20 05:05  56 26     100


 


9/6/20 05:00  66 26 108/47 (67) 100   


 


9/6/20 05:00    108/47    


 


9/6/20 04:30  55 26 98/47 (64) 100   


 


9/6/20 04:00  55      


 


9/6/20 04:00        100


 


9/6/20 04:00 98.5 57 26 97/48 (64) 100   


 


9/6/20 04:00    97/48    


 


9/6/20 04:00      Mechanical Ventilator  





      Mechanical Ventilator  


 


9/6/20 03:30  64 25 109/50 (69) 100   


 


9/6/20 03:00  55 26 107/47 (67) 100   


 


9/6/20 03:00    107/47    


 


9/6/20 02:39  52 26     100


 


9/6/20 02:30  54 26 106/45 (65) 100   


 


9/6/20 02:00  57 26 105/49 (67) 100   


 


9/6/20 02:00    105/49    


 


9/6/20 01:30  52 26 105/47 (66) 100   


 


9/6/20 01:30  51 26     100


 


9/6/20 01:00    101/45    


 


9/6/20 01:00  57 26 101/45 (63) 100   


 


9/6/20 00:30  57 26 97/50 (66) 100   


 


9/6/20 00:19  58      


 


9/6/20 00:00    99/50    


 


9/6/20 00:00 98.2 63 21 99/50 (66) 100   


 


9/6/20 00:00      Mechanical Ventilator  





      Mechanical Ventilator  


 


9/6/20 00:00        100


 


9/5/20 23:30  66 26     100


 


9/5/20 23:30  84 20 87/57 (67) 97   


 


9/5/20 23:00    98/50    


 


9/5/20 23:00  56 25 98/50 (66) 100   


 


9/5/20 22:30  66 22 92/50 (64) 94   


 


9/5/20 22:00  57 26 91/48 (62) 100   


 


9/5/20 22:00    91/48    


 


9/5/20 21:30  59 26 94/45 (61) 100   


 


9/5/20 21:00    93/53    


 


9/5/20 21:00  61 26 93/53 (66) 100   


 


9/5/20 20:45  55 26     100


 


9/5/20 20:30  60 25 93/48 (63) 99   


 


9/5/20 20:00      Mechanical Ventilator  





      Mechanical Ventilator  


 


9/5/20 20:00 98.4 61 26 98/45 (62) 100   


 


9/5/20 20:00    98/45    


 


9/5/20 20:00        100


 


9/5/20 19:30  56 26     100


 


9/5/20 19:30  53 26 91/47 (62) 100   


 


9/5/20 19:14  52      


 


9/5/20 19:00    102/52    


 


9/5/20 19:00  53 26 102/52 (69) 100   


 


9/5/20 18:28  59 26 103/55 (71) 100   


 


9/5/20 18:00  54 26 101/45 (63) 100   


 


9/5/20 18:00    101/45    


 


9/5/20 17:30  55 26 93/48 (63) 100   


 


9/5/20 17:00    93/48    


 


9/5/20 17:00  56 26 89/45 (60) 100   


 


9/5/20 16:41  60 26     100


 


9/5/20 16:30  65 26 93/44 (60) 100   


 


9/5/20 16:00        100


 


9/5/20 16:00      Mechanical Ventilator  





      Mechanical Ventilator  


 


9/5/20 16:00  63      


 


9/5/20 16:00    89/45    


 


9/5/20 16:00 99.2 64 26 84/44 (57) 100   


 


9/5/20 15:30  63 26 119/60 (79) 100   


 


9/5/20 15:00  72 26 93/40 (57) 100   


 


9/5/20 15:00    93/40    


 


9/5/20 14:30  61 26     100


 


9/5/20 14:30  68 26 99/38 (58) 100   


 


9/5/20 14:00    96/41    


 


9/5/20 14:00    99/38    


 


9/5/20 14:00  66 26 96/41 (59) 100   


 


9/5/20 13:30  64 26 92/42 (59) 100   


 


9/5/20 13:00    98/44    


 


9/5/20 13:00  57 26 98/44 (62) 100   


 


9/5/20 12:52  54 26     100


 


9/5/20 12:30  55 26 89/49 (62) 100   


 


9/5/20 12:00 98.5 55 26 95/47 (63) 99   


 


9/5/20 12:00    95/47    


 


9/5/20 12:00      Mechanical Ventilator  





      Mechanical Ventilator  


 


9/5/20 12:00        100








Height (Feet):  5


Height (Inches):  3.00


Weight (Pounds):  172


GEN: NAD on Vent 


HEENT: NCAT,Intubated


Pulm: Equal chest rise and fall B/L, No accessory muscle use


ABD: Soft, ND


SKIN: Exposed skin with no rash, Normal in color





Laboratory Tests








Test


  9/6/20


04:00


 


White Blood Count


  14.9 K/UL


(4.8-10.8)  H


 


Red Blood Count


  2.44 M/UL


(4.20-5.40)  L


 


Hemoglobin


  8.3 G/DL


(12.0-16.0)  L


 


Hematocrit


  24.3 %


(37.0-47.0)  L


 


Mean Corpuscular Volume


  100 FL (80-99)


H


 


Mean Corpuscular Hemoglobin


  34.0 PG


(27.0-31.0)  H


 


Mean Corpuscular Hemoglobin


Concent 34.1 G/DL


(32.0-36.0)


 


Red Cell Distribution Width


  14.2 %


(11.6-14.8)


 


Platelet Count


  58 K/UL


(150-450)  L


 


Mean Platelet Volume


  8.0 FL


(6.5-10.1)


 


Neutrophils (%) (Auto)


  % (45.0-75.0)


 


 


Lymphocytes (%) (Auto)


  % (20.0-45.0)


 


 


Monocytes (%) (Auto)  % (1.0-10.0)  


 


Eosinophils (%) (Auto)  % (0.0-3.0)  


 


Basophils (%) (Auto)  % (0.0-2.0)  


 


Differential Total Cells


Counted 100  


 


 


Neutrophils % (Manual) 80 % (45-75)  H


 


Lymphocytes % (Manual) 10 % (20-45)  L


 


Monocytes % (Manual) 10 % (1-10)  


 


Eosinophils % (Manual) 0 % (0-3)  


 


Basophils % (Manual) 0 % (0-2)  


 


Band Neutrophils 0 % (0-8)  


 


Platelet Estimate Decreased  L


 


Platelet Morphology Normal  


 


Hypochromasia 2+  


 


Anisocytosis 1+  


 


Macrocytosis 1+  


 


Sodium Level


  150 MMOL/L


(136-145)  H


 


Potassium Level


  3.4 MMOL/L


(3.5-5.1)  L


 


Chloride Level


  117 MMOL/L


()  H


 


Carbon Dioxide Level


  22 MMOL/L


(21-32)


 


Anion Gap


  11 mmol/L


(5-15)


 


Blood Urea Nitrogen


  45 mg/dL


(7-18)  H


 


Creatinine


  1.4 MG/DL


(0.55-1.30)  H


 


Estimat Glomerular Filtration


Rate 38.7 mL/min


(>60)


 


Glucose Level


  147 MG/DL


()  H


 


Calcium Level


  8.3 MG/DL


(8.5-10.1)  L


 


Phosphorus Level


  3.1 MG/DL


(2.5-4.9)


 


Magnesium Level


  2.1 MG/DL


(1.8-2.4)


 


Total Bilirubin


  0.7 MG/DL


(0.2-1.0)


 


Aspartate Amino Transf


(AST/SGOT) 83 U/L (15-37)


H


 


Alanine Aminotransferase


(ALT/SGPT) 86 U/L (12-78)


H


 


Alkaline Phosphatase


  61 U/L


()


 


C-Reactive Protein,


Quantitative 1.3 mg/dL


(0.00-0.90)  H


 


Pro-B-Type Natriuretic Peptide


  6073 pg/mL


(0-125)  H


 


Total Protein


  5.0 G/DL


(6.4-8.2)  L


 


Albumin


  1.4 G/DL


(3.4-5.0)  L


 


Globulin 3.6 g/dL  


 


Albumin/Globulin Ratio


  0.4 (1.0-2.7)


L











Current Medications








 Medications


  (Trade)  Dose


 Ordered  Sig/Didi


 Route


 PRN Reason  Start Time


 Stop Time Status Last Admin


Dose Admin


 


 Acetaminophen


  (Tylenol)  650 mg  Q4H  PRN


 GT


 FEVER  8/26/20 11:45


 9/25/20 11:44  8/29/20 23:15


 


 


 Chlorhexidine


 Gluconate


  (Beatrice-Hex 2%)  1 applic  DAILY@2000


 TOPIC


   8/31/20 20:00


 11/29/20 19:59  9/5/20 20:07


 


 


 Clotrimazole


  (Lotrimin)  1 applic  Q12HR


 TOPIC


   8/30/20 13:00


 11/28/20 12:59  9/6/20 08:31


 


 


 Dextrose  500 ml @ 


 500 mls/hr  ONCE


 IV


   9/6/20 10:00


 9/6/20 12:00  9/6/20 10:17


 


 


 Dextrose


  (Dextrose 50%)  25 ml  Q30M  PRN


 IV


 Hypoglycemia  8/26/20 11:30


 11/20/20 11:29   


 


 


 Dextrose


  (Dextrose 50%)  50 ml  Q30M  PRN


 IV


 Hypoglycemia  8/26/20 11:30


 11/20/20 11:29   


 


 


 Dopamine HCl/


 Dextrose  250 ml @ 0


 mls/hr  Q24H


 IV


   9/4/20 11:15


 12/3/20 11:14  9/5/20 14:00


 


 


 Hydrocortisone


  (Solu-CORTEF)  100 mg  EVERY 8  HOURS


 IV


   8/30/20 14:00


 11/28/20 13:59  9/6/20 05:23


 


 


 Levothyroxine


 Sodium


  (Synthroid)  75 mcg  DAILY


 GT


   8/27/20 09:00


 9/22/20 08:59  9/6/20 08:31


 


 


 Lorazepam


  (Ativan 2mg/ml


 1ml)  2 mg  Q2H  PRN


 IV


 For Anxiety  9/4/20 17:30


 9/11/20 17:29  9/4/20 17:37


 


 


 Meropenem 1 gm/


 Sodium Chloride  55 ml @ 


 110 mls/hr  Q8H


 IVPB


   9/6/20 12:00


 9/11/20 11:59  9/6/20 11:29


 


 


 Midodrine


  (Pro-Amatine)  10 mg  Q8HR


 GT


   8/28/20 14:00


 11/26/20 13:59  9/6/20 05:23


 


 


 Norepinephrine


 Bitartrate 16 mg/


 Dextrose  500 ml @ 0


 mls/hr  Q24H


 IV


   8/31/20 07:45


 9/29/20 12:59  9/4/20 08:43


 


 


 Ondansetron HCl


  (Zofran)  4 mg  Q6H  PRN


 IVP


 Nausea & Vomiting  8/26/20 12:00


 9/21/20 11:59   


 


 


 Pantoprazole


  (Protonix)  40 mg  DAILY


 IV


   8/27/20 09:00


 9/22/20 08:59  9/6/20 08:31


 


 


 Phenylephrine HCl


 50 mg/Dextrose  250 ml @ 0


 mls/hr  Q24H  PRN


 IV


 For hypotension  8/29/20 17:45


 9/28/20 17:44  8/31/20 01:49


 


 


 Polyethylene


 Glycol


  (Miralax)  17 gm  DAILYPRN  PRN


 GT


 Constipation  8/26/20 12:00


 9/21/20 11:59   


 


 


 Potassium Chloride  100 ml @ 


 50 mls/hr  ONCE  ONCE


 IVPB


   9/6/20 10:00


 9/6/20 11:59  9/6/20 10:15


 


 


 Valproic Acid


  (Depakene)  500 mg  EVERY 8  HOURS


 GT


   8/26/20 14:00


 9/21/20 21:59  9/6/20 05:23


 

















Elfego Mcmahon MD Sep 6, 2020 11:55

## 2020-09-06 NOTE — NUR
RD ASSESSMENT & RECOMMENDATIONS

SEE CARE ACTIVITY FOR COMPLETE ASSESSMENT



DAILY ESTIMATED NEEDS:

Needs based on CRITICAL CARE, 50kg  

22-27  kcals/kg 

8122-5145  total kcals

1.25-2  g protein/kg

  g total protein 

25-30  mL/kg

9309-0777  total fluid mLs



NUTRITION DIAGNOSIS:

Swallowing difficulty r/t dysphagia as evidenced by pt w/ Downs Syndrome,

PEG dep for all nutritional needs, now intubated, pressor support, ICU

status.



CURRENT TF: Jevity 1.2 @50ml/hr x 22 hrs 





ENTERAL NUTRITION RECOMMENDATIONS:

VITAL AF 1.2 @ 50ml/hr x22 hrs   to provide 1100ml, 1320 kcal, 83g pro, 892ml free H2O  



- Rec VITAL AF 1.2 for critical care, high pro needs, possible improved

tolerance.

- Initiate @ 20ml/hr x 6hrs, advance as tolerated to goal w/ hemodynamic

stability.

- Hold TF 1 hr before and after synthroid meds.

- Flush per MD, HOB over 30 degrees.

-------------

***WITHOUT HEMODYANAMIC STABILITY, rec trophic feeds of Vital AF 1.2 @

10ml/hr***





ADDITIONAL RECOMMENDATIONS:

1) Ht of 61 inches per SNF; recalibrate bed scale for accurate CBW  

2) Monitor BG closely w/ Solucortef, need for NISS 

3) Monitor hemodynamic stability: on pressors x 2, titrating down 

        -> rec trophic feeds while not hemodynamically stable 

4) Wound healing: Continue Vit C 250mg QD + Marcio BID 

5) Monitor lytes, replete as needed 

6) Rec prokinetics w/ continued residuals.

## 2020-09-06 NOTE — NUR
NURSE NOTES:

Nutrition present at bedside, discussed current pt condition and plan of care. Marcio 
obtained per order.

## 2020-09-06 NOTE — NUR
NURSE NOTES:

Bedside assessment performed, assessed pt with a FLACC scale of 0 noted. Pt observed to be 
resting comfortably in bed. VS obtained and remain stable at this time. Pt provided with a 
partial bed bath and oral care. Dopamine continues to infuse at 2 mcg/kg/min with no adverse 
effects noted. Respiratory status remains stable with no s/sx of acute distress noted, 
consistent with ventilator settings at this time. AM lab draw performed without incident and 
samples delivered to laboratory for analysis. Will await results and continue to monitor. No 
change in neurological assessment, findings remain consistent with previous physical 
assessment. No s/sx of seizures noted for duration of shift. Pt remains clean and dry. Pt 
repositioned for safety and comfort. Fall, Aspiration, Seizure and Skin precautions 
observed. Pt remains resting in bed; Bed remains in the lowest position with the safety 
wheels engaged, call light within reach, side rails up x3 and bed alarm activated. Will 
continue plan of care. Will continue to monitor.

## 2020-09-06 NOTE — INTERNAL MED PROGRESS NOTE
Subjective


Physician Name


Tyson Hung


Attending Physician


Chano Cherry MD





Current Medications








 Medications


  (Trade)  Dose


 Ordered  Sig/Didi


 Route


 PRN Reason  Start Time


 Stop Time Status Last Admin


Dose Admin


 


 Acetaminophen


  (Tylenol)  650 mg  Q4H  PRN


 GT


 FEVER  8/26/20 11:45


 9/25/20 11:44  8/29/20 23:15


 


 


 Chlorhexidine


 Gluconate


  (Beatrice-Hex 2%)  1 applic  DAILY@2000


 TOPIC


   8/31/20 20:00


 11/29/20 19:59  9/5/20 20:07


 


 


 Clotrimazole


  (Lotrimin)  1 applic  Q12HR


 TOPIC


   8/30/20 13:00


 11/28/20 12:59  9/6/20 08:31


 


 


 Dextrose


  (Dextrose 50%)  25 ml  Q30M  PRN


 IV


 Hypoglycemia  8/26/20 11:30


 11/20/20 11:29   


 


 


 Dextrose


  (Dextrose 50%)  50 ml  Q30M  PRN


 IV


 Hypoglycemia  8/26/20 11:30


 11/20/20 11:29   


 


 


 Dopamine HCl/


 Dextrose  250 ml @ 0


 mls/hr  Q24H


 IV


   9/4/20 11:15


 12/3/20 11:14  9/5/20 14:00


 


 


 Hydrocortisone


  (Solu-CORTEF)  100 mg  EVERY 8  HOURS


 IV


   8/30/20 14:00


 11/28/20 13:59  9/6/20 13:41


 


 


 Levothyroxine


 Sodium


  (Synthroid)  75 mcg  DAILY


 GT


   8/27/20 09:00


 9/22/20 08:59  9/6/20 08:31


 


 


 Lorazepam


  (Ativan 2mg/ml


 1ml)  2 mg  Q2H  PRN


 IV


 For Anxiety  9/4/20 17:30


 9/11/20 17:29  9/4/20 17:37


 


 


 Meropenem 1 gm/


 Sodium Chloride  55 ml @ 


 110 mls/hr  Q8H


 IVPB


   9/6/20 12:00


 9/11/20 11:59  9/6/20 11:29


 


 


 Midodrine


  (Pro-Amatine)  10 mg  Q8HR


 GT


   8/28/20 14:00


 11/26/20 13:59  9/6/20 13:41


 


 


 Norepinephrine


 Bitartrate 16 mg/


 Dextrose  500 ml @ 0


 mls/hr  Q24H


 IV


   8/31/20 07:45


 9/29/20 12:59  9/4/20 08:43


 


 


 Ondansetron HCl


  (Zofran)  4 mg  Q6H  PRN


 IVP


 Nausea & Vomiting  8/26/20 12:00


 9/21/20 11:59   


 


 


 Pantoprazole


  (Protonix)  40 mg  DAILY


 IV


   8/27/20 09:00


 9/22/20 08:59  9/6/20 08:31


 


 


 Phenylephrine HCl


 50 mg/Dextrose  250 ml @ 0


 mls/hr  Q24H  PRN


 IV


 For hypotension  8/29/20 17:45


 9/28/20 17:44  8/31/20 01:49


 


 


 Polyethylene


 Glycol


  (Miralax)  17 gm  DAILYPRN  PRN


 GT


 Constipation  8/26/20 12:00


 9/21/20 11:59   


 


 


 Valproic Acid


  (Depakene)  500 mg  EVERY 8  HOURS


 GT


   8/26/20 14:00


 9/21/20 21:59  9/6/20 13:41


 








Allergies:  


Coded Allergies:  


     No Known Allergies (Unverified , 1/8/19)


Subjective


in ICU, remained intubated on the vent, open eyes, unable to follow command. WBC

: 14.9 K: 3.4. Renal function stable





Objective





Last Vital Signs








  Date Time  Temp Pulse Resp B/P (MAP) Pulse Ox O2 Delivery O2 Flow Rate FiO2


 


9/6/20 15:00    104/43    


 


9/6/20 15:00  63 26  100   


 


9/6/20 14:51        100


 


9/6/20 12:00      Mechanical Ventilator  





      Mechanical Ventilator  


 


9/6/20 12:00 98.4       











Laboratory Tests








Test


  9/6/20


04:00


 


White Blood Count


  14.9 K/UL


(4.8-10.8)  H


 


Red Blood Count


  2.44 M/UL


(4.20-5.40)  L


 


Hemoglobin


  8.3 G/DL


(12.0-16.0)  L


 


Hematocrit


  24.3 %


(37.0-47.0)  L


 


Mean Corpuscular Volume


  100 FL (80-99)


H


 


Mean Corpuscular Hemoglobin


  34.0 PG


(27.0-31.0)  H


 


Mean Corpuscular Hemoglobin


Concent 34.1 G/DL


(32.0-36.0)


 


Red Cell Distribution Width


  14.2 %


(11.6-14.8)


 


Platelet Count


  58 K/UL


(150-450)  L


 


Mean Platelet Volume


  8.0 FL


(6.5-10.1)


 


Neutrophils (%) (Auto)


  % (45.0-75.0)


 


 


Lymphocytes (%) (Auto)


  % (20.0-45.0)


 


 


Monocytes (%) (Auto)  % (1.0-10.0)  


 


Eosinophils (%) (Auto)  % (0.0-3.0)  


 


Basophils (%) (Auto)  % (0.0-2.0)  


 


Differential Total Cells


Counted 100  


 


 


Neutrophils % (Manual) 80 % (45-75)  H


 


Lymphocytes % (Manual) 10 % (20-45)  L


 


Monocytes % (Manual) 10 % (1-10)  


 


Eosinophils % (Manual) 0 % (0-3)  


 


Basophils % (Manual) 0 % (0-2)  


 


Band Neutrophils 0 % (0-8)  


 


Platelet Estimate Decreased  L


 


Platelet Morphology Normal  


 


Hypochromasia 2+  


 


Anisocytosis 1+  


 


Macrocytosis 1+  


 


Sodium Level


  150 MMOL/L


(136-145)  H


 


Potassium Level


  3.4 MMOL/L


(3.5-5.1)  L


 


Chloride Level


  117 MMOL/L


()  H


 


Carbon Dioxide Level


  22 MMOL/L


(21-32)


 


Anion Gap


  11 mmol/L


(5-15)


 


Blood Urea Nitrogen


  45 mg/dL


(7-18)  H


 


Creatinine


  1.4 MG/DL


(0.55-1.30)  H


 


Estimat Glomerular Filtration


Rate 38.7 mL/min


(>60)


 


Glucose Level


  147 MG/DL


()  H


 


Calcium Level


  8.3 MG/DL


(8.5-10.1)  L


 


Phosphorus Level


  3.1 MG/DL


(2.5-4.9)


 


Magnesium Level


  2.1 MG/DL


(1.8-2.4)


 


Total Bilirubin


  0.7 MG/DL


(0.2-1.0)


 


Aspartate Amino Transf


(AST/SGOT) 83 U/L (15-37)


H


 


Alanine Aminotransferase


(ALT/SGPT) 86 U/L (12-78)


H


 


Alkaline Phosphatase


  61 U/L


()


 


C-Reactive Protein,


Quantitative 1.3 mg/dL


(0.00-0.90)  H


 


Pro-B-Type Natriuretic Peptide


  6073 pg/mL


(0-125)  H


 


Total Protein


  5.0 G/DL


(6.4-8.2)  L


 


Albumin


  1.4 G/DL


(3.4-5.0)  L


 


Globulin 3.6 g/dL  


 


Albumin/Globulin Ratio


  0.4 (1.0-2.7)


L

















Intake and Output  


 


 9/5/20 9/6/20





 19:00 07:00


 


Intake Total 751.18820 ml 863.275 ml


 


Output Total 390 ml 685 ml


 


Balance 361.97457 ml 178.275 ml


 


  


 


Free Water  120 ml


 


IV Total 251.39453 ml 143.275 ml


 


Tube Feeding 500 ml 600 ml


 


Output Urine Total 390 ml 685 ml


 


# Bowel Movements 1 1








Objective


General: remain intubated, unable to follow command, open eyes.


HEENT: NCAT, sclera anicteric, PERRL, ET Tube.


Neck: Supple, no significant jugular venous distention, 


Lungs: mechanical breath sounds decreased air at the bases,  no Wheeze or Rales.


Heart: Regular rate and rhythm, normal S1/S2, no murmurs/gallops


Abdomen: soft, not tender, not distended. + bowel sounds, Morbid Obesity, PEG 

site intact.


Extremities: No Cyanosis , clubbing,  upper extremities less edema. left upper 

extremity PICC line.


Neuro: limited secondary to patient status, unable to follow command, bilateral 

upper and lower extremities contracted.





Assessment/Plan


Assessment/Plan


Problem List:  


(1) HCAP (healthcare-associated pneumonia)


(2) Sepsis


(3) Down's syndrome


(4) Dysphagia S/P PEG


(5) Seizure disorder


(6) Hypothyroidism


(7) Acute Hypoxemic respiratory failure


(8) Persistent, high grade bacteremia-  r/o endocarditis


(9) DAVID





Plan: 


Tolerated tube feeding @ 50 cc/hr


Follow-up with laboratory and cultures


on Dopamine drip @ 2 Mcq


Hydrocortisone 100 mg IV every 8


Cont Daptomycin #13(abx d #14/14) for GPC bacteremia in view of DAVID.


Kcl supplements.











Tyson Hung MD Sep 6, 2020 17:17

## 2020-09-07 VITALS — SYSTOLIC BLOOD PRESSURE: 122 MMHG | DIASTOLIC BLOOD PRESSURE: 59 MMHG

## 2020-09-07 VITALS — DIASTOLIC BLOOD PRESSURE: 41 MMHG | SYSTOLIC BLOOD PRESSURE: 97 MMHG

## 2020-09-07 VITALS — DIASTOLIC BLOOD PRESSURE: 48 MMHG | SYSTOLIC BLOOD PRESSURE: 95 MMHG

## 2020-09-07 VITALS — DIASTOLIC BLOOD PRESSURE: 55 MMHG | SYSTOLIC BLOOD PRESSURE: 104 MMHG

## 2020-09-07 VITALS — SYSTOLIC BLOOD PRESSURE: 128 MMHG | DIASTOLIC BLOOD PRESSURE: 65 MMHG

## 2020-09-07 VITALS — SYSTOLIC BLOOD PRESSURE: 97 MMHG | DIASTOLIC BLOOD PRESSURE: 56 MMHG

## 2020-09-07 VITALS — SYSTOLIC BLOOD PRESSURE: 87 MMHG | DIASTOLIC BLOOD PRESSURE: 74 MMHG

## 2020-09-07 VITALS — DIASTOLIC BLOOD PRESSURE: 51 MMHG | SYSTOLIC BLOOD PRESSURE: 99 MMHG

## 2020-09-07 VITALS — SYSTOLIC BLOOD PRESSURE: 113 MMHG | DIASTOLIC BLOOD PRESSURE: 56 MMHG

## 2020-09-07 VITALS — SYSTOLIC BLOOD PRESSURE: 93 MMHG | DIASTOLIC BLOOD PRESSURE: 48 MMHG

## 2020-09-07 VITALS — DIASTOLIC BLOOD PRESSURE: 53 MMHG | SYSTOLIC BLOOD PRESSURE: 99 MMHG

## 2020-09-07 VITALS — DIASTOLIC BLOOD PRESSURE: 50 MMHG | SYSTOLIC BLOOD PRESSURE: 96 MMHG

## 2020-09-07 VITALS — DIASTOLIC BLOOD PRESSURE: 61 MMHG | SYSTOLIC BLOOD PRESSURE: 121 MMHG

## 2020-09-07 VITALS — SYSTOLIC BLOOD PRESSURE: 99 MMHG | DIASTOLIC BLOOD PRESSURE: 47 MMHG

## 2020-09-07 VITALS — DIASTOLIC BLOOD PRESSURE: 41 MMHG | SYSTOLIC BLOOD PRESSURE: 93 MMHG

## 2020-09-07 VITALS — DIASTOLIC BLOOD PRESSURE: 46 MMHG | SYSTOLIC BLOOD PRESSURE: 95 MMHG

## 2020-09-07 VITALS — SYSTOLIC BLOOD PRESSURE: 115 MMHG | DIASTOLIC BLOOD PRESSURE: 69 MMHG

## 2020-09-07 VITALS — DIASTOLIC BLOOD PRESSURE: 50 MMHG | SYSTOLIC BLOOD PRESSURE: 97 MMHG

## 2020-09-07 VITALS — SYSTOLIC BLOOD PRESSURE: 93 MMHG | DIASTOLIC BLOOD PRESSURE: 77 MMHG

## 2020-09-07 VITALS — DIASTOLIC BLOOD PRESSURE: 51 MMHG | SYSTOLIC BLOOD PRESSURE: 97 MMHG

## 2020-09-07 VITALS — DIASTOLIC BLOOD PRESSURE: 50 MMHG | SYSTOLIC BLOOD PRESSURE: 98 MMHG

## 2020-09-07 VITALS — SYSTOLIC BLOOD PRESSURE: 95 MMHG | DIASTOLIC BLOOD PRESSURE: 51 MMHG

## 2020-09-07 VITALS — DIASTOLIC BLOOD PRESSURE: 50 MMHG | SYSTOLIC BLOOD PRESSURE: 102 MMHG

## 2020-09-07 VITALS — SYSTOLIC BLOOD PRESSURE: 92 MMHG | DIASTOLIC BLOOD PRESSURE: 53 MMHG

## 2020-09-07 VITALS — DIASTOLIC BLOOD PRESSURE: 46 MMHG | SYSTOLIC BLOOD PRESSURE: 91 MMHG

## 2020-09-07 VITALS — DIASTOLIC BLOOD PRESSURE: 51 MMHG | SYSTOLIC BLOOD PRESSURE: 100 MMHG

## 2020-09-07 VITALS — SYSTOLIC BLOOD PRESSURE: 104 MMHG | DIASTOLIC BLOOD PRESSURE: 53 MMHG

## 2020-09-07 VITALS — DIASTOLIC BLOOD PRESSURE: 50 MMHG | SYSTOLIC BLOOD PRESSURE: 101 MMHG

## 2020-09-07 VITALS — DIASTOLIC BLOOD PRESSURE: 39 MMHG | SYSTOLIC BLOOD PRESSURE: 90 MMHG

## 2020-09-07 VITALS — DIASTOLIC BLOOD PRESSURE: 51 MMHG | SYSTOLIC BLOOD PRESSURE: 98 MMHG

## 2020-09-07 VITALS — SYSTOLIC BLOOD PRESSURE: 95 MMHG | DIASTOLIC BLOOD PRESSURE: 50 MMHG

## 2020-09-07 VITALS — DIASTOLIC BLOOD PRESSURE: 56 MMHG | SYSTOLIC BLOOD PRESSURE: 104 MMHG

## 2020-09-07 VITALS — DIASTOLIC BLOOD PRESSURE: 43 MMHG | SYSTOLIC BLOOD PRESSURE: 96 MMHG

## 2020-09-07 VITALS — SYSTOLIC BLOOD PRESSURE: 98 MMHG | DIASTOLIC BLOOD PRESSURE: 50 MMHG

## 2020-09-07 VITALS — DIASTOLIC BLOOD PRESSURE: 64 MMHG | SYSTOLIC BLOOD PRESSURE: 90 MMHG

## 2020-09-07 VITALS — DIASTOLIC BLOOD PRESSURE: 47 MMHG | SYSTOLIC BLOOD PRESSURE: 98 MMHG

## 2020-09-07 VITALS — SYSTOLIC BLOOD PRESSURE: 98 MMHG | DIASTOLIC BLOOD PRESSURE: 48 MMHG

## 2020-09-07 VITALS — SYSTOLIC BLOOD PRESSURE: 107 MMHG | DIASTOLIC BLOOD PRESSURE: 61 MMHG

## 2020-09-07 VITALS — SYSTOLIC BLOOD PRESSURE: 104 MMHG | DIASTOLIC BLOOD PRESSURE: 54 MMHG

## 2020-09-07 VITALS — SYSTOLIC BLOOD PRESSURE: 92 MMHG | DIASTOLIC BLOOD PRESSURE: 49 MMHG

## 2020-09-07 VITALS — DIASTOLIC BLOOD PRESSURE: 48 MMHG | SYSTOLIC BLOOD PRESSURE: 92 MMHG

## 2020-09-07 VITALS — SYSTOLIC BLOOD PRESSURE: 91 MMHG | DIASTOLIC BLOOD PRESSURE: 48 MMHG

## 2020-09-07 VITALS — SYSTOLIC BLOOD PRESSURE: 92 MMHG | DIASTOLIC BLOOD PRESSURE: 43 MMHG

## 2020-09-07 VITALS — DIASTOLIC BLOOD PRESSURE: 48 MMHG | SYSTOLIC BLOOD PRESSURE: 93 MMHG

## 2020-09-07 VITALS — SYSTOLIC BLOOD PRESSURE: 96 MMHG | DIASTOLIC BLOOD PRESSURE: 50 MMHG

## 2020-09-07 VITALS — DIASTOLIC BLOOD PRESSURE: 46 MMHG | SYSTOLIC BLOOD PRESSURE: 101 MMHG

## 2020-09-07 VITALS — SYSTOLIC BLOOD PRESSURE: 97 MMHG | DIASTOLIC BLOOD PRESSURE: 42 MMHG

## 2020-09-07 LAB
% IRON SATURATION: 16 % (ref 15–50)
ADD MANUAL DIFF: YES
ANION GAP SERPL CALC-SCNC: 5 MMOL/L (ref 5–15)
BUN SERPL-MCNC: 47 MG/DL (ref 7–18)
CALCIUM SERPL-MCNC: 8.2 MG/DL (ref 8.5–10.1)
CHLORIDE SERPL-SCNC: 119 MMOL/L (ref 98–107)
CO2 SERPL-SCNC: 24 MMOL/L (ref 21–32)
CREAT SERPL-MCNC: 1.2 MG/DL (ref 0.55–1.3)
ERYTHROCYTE [DISTWIDTH] IN BLOOD BY AUTOMATED COUNT: 14.9 % (ref 11.6–14.8)
FERRITIN SERPL-MCNC: 732 NG/ML (ref 8–388)
HCT VFR BLD CALC: 20.9 % (ref 37–47)
HGB BLD-MCNC: 7 G/DL (ref 12–16)
IRON SERPL-MCNC: 18 UG/DL (ref 50–175)
MCV RBC AUTO: 100 FL (ref 80–99)
PLATELET # BLD: 71 K/UL (ref 150–450)
POTASSIUM SERPL-SCNC: 3.6 MMOL/L (ref 3.5–5.1)
RBC # BLD AUTO: 2.09 M/UL (ref 4.2–5.4)
SODIUM SERPL-SCNC: 148 MMOL/L (ref 136–145)
TIBC SERPL-MCNC: 112 UG/DL (ref 250–450)
UNSATURATED IRON BINDING: 94 UG/DL (ref 112–346)
WBC # BLD AUTO: 18.8 K/UL (ref 4.8–10.8)

## 2020-09-07 RX ADMIN — HYDROCORTISONE SODIUM SUCCINATE SCH MG: 100 INJECTION, POWDER, FOR SOLUTION INTRAMUSCULAR; INTRAVENOUS at 14:17

## 2020-09-07 RX ADMIN — MIDODRINE HYDROCHLORIDE SCH MG: 10 TABLET ORAL at 14:17

## 2020-09-07 RX ADMIN — HYDROCORTISONE SODIUM SUCCINATE SCH MG: 100 INJECTION, POWDER, FOR SOLUTION INTRAMUSCULAR; INTRAVENOUS at 04:48

## 2020-09-07 RX ADMIN — VALPROIC ACID SCH MG: 250 SOLUTION ORAL at 04:48

## 2020-09-07 RX ADMIN — ACETAMINOPHEN PRN MG: 160 SOLUTION ORAL at 03:17

## 2020-09-07 RX ADMIN — VALPROIC ACID SCH MG: 250 SOLUTION ORAL at 14:17

## 2020-09-07 RX ADMIN — SODIUM CHLORIDE SCH MLS/HR: 900 INJECTION, SOLUTION INTRAVENOUS at 07:45

## 2020-09-07 RX ADMIN — MIDODRINE HYDROCHLORIDE SCH MG: 10 TABLET ORAL at 04:48

## 2020-09-07 RX ADMIN — MEROPENEM SCH MLS/HR: 1 INJECTION INTRAVENOUS at 19:54

## 2020-09-07 RX ADMIN — MIDODRINE HYDROCHLORIDE SCH MG: 10 TABLET ORAL at 21:38

## 2020-09-07 RX ADMIN — VALPROIC ACID SCH MG: 250 SOLUTION ORAL at 21:38

## 2020-09-07 RX ADMIN — MEROPENEM SCH MLS/HR: 1 INJECTION INTRAVENOUS at 11:59

## 2020-09-07 RX ADMIN — HYDROCORTISONE SODIUM SUCCINATE SCH MG: 100 INJECTION, POWDER, FOR SOLUTION INTRAMUSCULAR; INTRAVENOUS at 21:38

## 2020-09-07 RX ADMIN — CHLORHEXIDINE GLUCONATE SCH APPLIC: 213 SOLUTION TOPICAL at 19:54

## 2020-09-07 RX ADMIN — PANTOPRAZOLE SODIUM SCH MG: 40 INJECTION, POWDER, FOR SOLUTION INTRAVENOUS at 08:51

## 2020-09-07 RX ADMIN — MEROPENEM SCH MLS/HR: 1 INJECTION INTRAVENOUS at 04:26

## 2020-09-07 NOTE — NUR
NURSE NOTES:

Bedside assessment performed, assessed pt with a FLACC scale of 0 noted. Pt observed to be 
resting comfortably in bed. VS obtained and remain stable at this time. Pt provided with a 
complete bed bath and oral care. Dopamine continues to infuse at 1 mcg/kg/min with no 
adverse effects noted. Blood drawn and sent to lab. Respiratory status remains stable with 
no s/sx of acute distress noted, consistent with ventilator settings at this time. Pt 
remains clean and dry. Pt repositioned for safety and comfort. Fall, Aspiration, Seizure and 
Skin precautions observed. Pt remains resting in bed; Bed remains in the lowest position 
with the safety wheels engaged, call light within reach, side rails up x3 and bed alarm 
activated. Will continue plan of care. Will continue to monitor.

## 2020-09-07 NOTE — PULMONOLGY CRITICAL CARE NOTE
Critical Care - Asmt/Plan


Problems:  


(1) Acute respiratory failure


(2) Bacteremia


(3) Pneumonia


(4) Sepsis


(5) HCAP (healthcare-associated pneumonia)


(6) Seizure disorder


(7) Down's syndrome


Respiratory:  monitor respiratory rate, adjust FIO2, CXR


Cardiac:  continue to monitor HR/BP


Renal:  F/U I&O, check electrolytes


Infectious Disease:  check cultures, continue antibiotics


Gastrointestinal:  continue feedings/current rate


Endocrine:  monitor blood sugar


Hematologic:  monitor H/H, transfuse if hgb<8.5


Neurologic:  keep patient comfortable


Affect:  PRN ativan


Prophylaxis:  Protonix, Heparin


Disposition:  keep in ICU


Time Spent (Minutes):  40


Notes Reviewed:  internist, renal


Discussed with:  nurses, consultants, 





Critical Care - Objective





Last 24 Hour Vital Signs








  Date Time  Temp Pulse Resp B/P (MAP) Pulse Ox O2 Delivery O2 Flow Rate FiO2


 


9/7/20 12:30  71 26 96/50 (65) 95   


 


9/7/20 12:00 99.6 65 26 95/50 (65) 94   


 


9/7/20 12:00        80


 


9/7/20 11:30  67 26 98/50 (66) 94   


 


9/7/20 11:06  86 30     80


 


9/7/20 11:00    93/48    


 


9/7/20 11:00  66 26 93/48 (63) 93   


 


9/7/20 10:30  68 26 99/53 (68) 92   


 


9/7/20 10:00  70 26 90/64 (73) 93   


 


9/7/20 10:00    90/64    


 


9/7/20 09:30  77 27 92/48 (63) 92   


 


9/7/20 09:15  70 26     80


 


9/7/20 09:00    99/47    


 


9/7/20 09:00  76 26 99/47 (64) 93   


 


9/7/20 08:30  69 26 101/46 (64) 94   


 


9/7/20 08:00        80


 


9/7/20 08:00    104/55    


 


9/7/20 08:00 99.4 65 26 104/55 (71) 94   


 


9/7/20 08:00      Mechanical Ventilator  





      Mechanical Ventilator  


 


9/7/20 08:00  74      


 


9/7/20 07:45  70 26 104/53 (70) 94   


 


9/7/20 07:45    104/53    


 


9/7/20 07:30  69 26 98/51 (67) 94   


 


9/7/20 07:10  67 26     80


 


9/7/20 07:00  69 26 93/48 (63) 93   


 


9/7/20 07:00    93/48    


 


9/7/20 06:45  62 26 95/48 (64) 93   


 


9/7/20 06:30  66 26 96/50 (65) 92   


 


9/7/20 06:00  67 26 102/50 (67) 93   


 


9/7/20 06:00    99/52    


 


9/7/20 05:30  78 28 95/46 (62) 94   


 


9/7/20 05:15  71 26     80


 


9/7/20 05:00    95/50    


 


9/7/20 05:00  67 27 95/51 (66) 95   


 


9/7/20 04:00 99.5 84 29 97/56 (70) 97   


 


9/7/20 04:00      Mechanical Ventilator  





      Mechanical Ventilator  


 


9/7/20 04:00  65      


 


9/7/20 04:00    97/56    


 


9/7/20 04:00        80


 


9/7/20 03:54  78 30     70


 


9/7/20 03:47 99.7       


 


9/7/20 03:30  72 27 91/48 (62) 91   


 


9/7/20 03:00 99.9 85 24 92/49 (63) 96   


 


9/7/20 03:00    92/49    


 


9/7/20 02:30  64 26 96/43 (60) 93   


 


9/7/20 02:00  66 26 90/39 (56) 93   


 


9/7/20 02:00    90/39    


 


9/7/20 01:30  64 26 92/43 (59) 94   


 


9/7/20 01:11  63 26     60


 


9/7/20 01:00    93/41    


 


9/7/20 01:00  64 26 93/41 (58) 94   


 


9/7/20 00:30  65 26 92/43 (59) 95   


 


9/7/20 00:15  66 26 97/42 (60) 97   


 


9/7/20 00:00      Mechanical Ventilator  





      Mechanical Ventilator  


 


9/7/20 00:00        70


 


9/7/20 00:00 98.7 68 26 97/41 (59) 96   


 


9/7/20 00:00    97/42    


 


9/7/20 00:00  61      


 


9/6/20 23:48  80 26     70


 


9/6/20 23:45  79 26 89/40 (56) 95   


 


9/6/20 23:30  81 26 93/47 (62) 97   


 


9/6/20 23:15  68 26 103/44 (63) 98   


 


9/6/20 23:00  75 24 99/51 (67) 98   


 


9/6/20 23:00    93/47    


 


9/6/20 22:45  72 26 102/44 (63) 98   


 


9/6/20 22:30  67 26 98/45 (62) 98   


 


9/6/20 22:00  64 26 111/59 (76) 100   


 


9/6/20 21:52  60 26     100


 


9/6/20 21:45  61 26 108/47 (67) 99   


 


9/6/20 21:30  65 26 111/54 (73) 100   


 


9/6/20 21:15  62 26 102/48 (66) 99   


 


9/6/20 21:00  64 26 105/47 (66) 100   


 


9/6/20 21:00    111/59    


 


9/6/20 20:30  63 26 104/52 (69) 100   


 


9/6/20 20:00        100


 


9/6/20 20:00  64      


 


9/6/20 20:00    108/56    


 


9/6/20 20:00    105/47    


 


9/6/20 20:00 99.0 61 26 108/46 (66) 100   


 


9/6/20 20:00      Mechanical Ventilator  





      Mechanical Ventilator  


 


9/6/20 19:56  63 26     100


 


9/6/20 19:47    102/51    


 


9/6/20 19:30  70 24 96/47 (63) 100   


 


9/6/20 19:00    102/51    


 


9/6/20 19:00  62 26 102/51 (68) 100   


 


9/6/20 18:30  68 26 114/42 (66) 100   


 


9/6/20 18:00  72 25 96/44 (61) 100   


 


9/6/20 18:00    96/44    


 


9/6/20 17:30  68 25 100/61 (74) 100   


 


9/6/20 17:00    94/43    


 


9/6/20 17:00  65 26 94/43 (60) 100   


 


9/6/20 16:30  68 26     100


 


9/6/20 16:30  62 26 100/45 (63) 100   


 


9/6/20 16:00        100


 


9/6/20 16:00  62      


 


9/6/20 16:00 98.3 68 26 98/44 (62) 97   


 


9/6/20 16:00      Mechanical Ventilator  





      Mechanical Ventilator  


 


9/6/20 16:00    98/44    


 


9/6/20 15:30  62 26 99/44 (62) 98   


 


9/6/20 15:00    104/43    


 


9/6/20 15:00  63 26 107/92 (97) 100   


 


9/6/20 14:51  61 26     100


 


9/6/20 14:30  78 26 92/40 (57) 98   


 


9/6/20 14:00  71 26 96/41 (59) 99   


 


9/6/20 14:00    96/41    


 


9/6/20 13:30  72 27 91/44 (60) 100   








Status:  sedated


Condition:  critical


HEENT:  atraumatic


Lungs:  clear


Heart:  HR/BP stable, regular


Abdomen:  soft, non-tender, feeding tube


Extremities:  no C/C/E


Decubiti:  stage


Accucheck:  131





Critical Care - Subjective


ROS Limited/Unobtainable:  Yes


Condition:  critical


EKG Rhythm:  Sinus Rhythm


FI02:  80


Vent Support Breath Rate:  26


Vent Support Mode:  AC


Vent Tidal Volume:  500


Sputum Amount:  Scant


PEEP:  10.0


PIP:  36


Tube Feeding Amount:  50


I&O:











Intake and Output  


 


 9/6/20 9/7/20





 19:00 07:00


 


Intake Total 1211.300 ml 882.168 ml


 


Output Total 755 ml 1000 ml


 


Balance 456.300 ml -117.832 ml


 


  


 


Free Water 110 ml 120 ml


 


IV Total 651.300 ml 162.168 ml


 


Tube Feeding 450 ml 600 ml


 


Output Urine Total 755 ml 1000 ml








CXR:


no change


ET-Tube:  7.5


ET Position:  22


Labs:





Laboratory Tests








Test


  9/7/20


04:45 9/7/20


10:09


 


White Blood Count


  18.8 K/UL


(4.8-10.8)  H 


 


 


Red Blood Count


  2.09 M/UL


(4.20-5.40)  L 


 


 


Hemoglobin


  7.0 G/DL


(12.0-16.0)  L 


 


 


Hematocrit


  20.9 %


(37.0-47.0)  L 


 


 


Mean Corpuscular Volume


  100 FL (80-99)


H 


 


 


Mean Corpuscular Hemoglobin


  33.3 PG


(27.0-31.0)  H 


 


 


Mean Corpuscular Hemoglobin


Concent 33.3 G/DL


(32.0-36.0) 


 


 


Red Cell Distribution Width


  14.9 %


(11.6-14.8)  H 


 


 


Platelet Count


  71 K/UL


(150-450)  L 


 


 


Mean Platelet Volume


  9.1 FL


(6.5-10.1) 


 


 


Neutrophils (%) (Auto)


  % (45.0-75.0)


  


 


 


Lymphocytes (%) (Auto)


  % (20.0-45.0)


  


 


 


Monocytes (%) (Auto)  % (1.0-10.0)   


 


Eosinophils (%) (Auto)  % (0.0-3.0)   


 


Basophils (%) (Auto)  % (0.0-2.0)   


 


Differential Total Cells


Counted 100  


  


 


 


Neutrophils % (Manual) 86 % (45-75)  H 


 


Lymphocytes % (Manual) 5 % (20-45)  L 


 


Monocytes % (Manual) 9 % (1-10)   


 


Eosinophils % (Manual) 0 % (0-3)   


 


Basophils % (Manual) 0 % (0-2)   


 


Band Neutrophils 0 % (0-8)   


 


Platelet Estimate Decreased  L 


 


Platelet Morphology Normal   


 


Hypochromasia 3+   


 


Anisocytosis 1+   


 


Spherocytes 2+   


 


Sodium Level


  148 MMOL/L


(136-145)  H 


 


 


Potassium Level


  3.6 MMOL/L


(3.5-5.1) 


 


 


Chloride Level


  119 MMOL/L


()  H 


 


 


Carbon Dioxide Level


  24 MMOL/L


(21-32) 


 


 


Anion Gap


  5 mmol/L


(5-15) 


 


 


Blood Urea Nitrogen


  47 mg/dL


(7-18)  H 


 


 


Creatinine


  1.2 MG/DL


(0.55-1.30) 


 


 


Estimat Glomerular Filtration


Rate 46.2 mL/min


(>60) 


 


 


Glucose Level


  129 MG/DL


()  H 


 


 


Calcium Level


  8.2 MG/DL


(8.5-10.1)  L 


 


 


Iron Level


  


  18 ug/dL


()  L


 


Total Iron Binding Capacity


  


  112 ug/dL


(250-450)  L


 


Percent Iron Saturation  16 % (15-50)  


 


Unsaturated Iron Binding


  


  94 ug/dL


(112-346)  L


 


Ferritin  Pending  


 


Vitamin B12 Level


  


  989 PG/ML


(193-986)  H


 


Folate


  


  10.0 NG/ML


(8.6-58.9)

















Chano Cherry MD Sep 7, 2020 13:28

## 2020-09-07 NOTE — NEPHROLOGY PROGRESS NOTE
Assessment/Plan


Problem List:  


(1) DAVID (acute kidney injury)


(2) Respiratory failure requiring intubation


(3) Down's syndrome


(4) Seizure disorder


(5) Hypothyroidism


Assessment





Acute renal failure, likely due to hypotension


Acute respiratory distress, hypoxia


Seizure disorder


Hypothyroidism


Down syndrome


Full code











Fluid challenge with IV fluids and albumin


Midodrine for BP above 100 systolic


Check TSH level


Check


Correct level


Monitor renal parameters


Urine studies


Per orders


Plan


September 7: Status quo.  Overall condition poor.  Very low albumin.  

Edematous.  Hypotensive.  Hemoglobin lower.  Anemia work-up ordered.  I favor 

transfusion 2 units of packed RBCs.  Patient remains full code.  I favor 

supportive care only.  Will discuss.


September 6: Electrolyte abnormalities addressed.  Serum creatinine lower.  

Continue per current management.


September 5: Status unchanged.  Lab reviewed.  Serum potassium 2.7.  IV 

potassium chloride ordered.  Serum creatinine low at 1.6 stable.  Blood 

pressure 90s systolic


September 4: Status quo.  Labs reviewed.  Renal parameters stable.  Serum 

creatinine down to 1.6.  Medication list reviewed.  Continues to be on 

midodrine.  Continue per consultants.


September 3: Status quo.  Labs reviewed.  Electrolytes adjusted.  Serum 

creatinine down to 1.8.  Continue per consultants.


September 2: Status quo.  Labs reviewed.  Phosphorus supplement IV given.  

Serum creatinine 2.  Continue per consultants.


September 1: Requires less pressors.  Albumin bolus given.  1 dose of Lasix IV 

ordered as the patient severely edematous.  Patient serum albumin is very low.  

Continue per consultants.


August 31: Continues to be intubated.  Labs reviewed.  Serum creatinine 1.9 

unchanged.  Blood pressure more stable.  Off 1 of the pressors.  Continue to 

monitor renal parameters.  Continue per consultants.  Patient now on 

hydrocortisone 100 mg every 8 hours.  Will decrease IV fluid.  Normal saline 

down to 50 cc an hour.


August 30: Intubated.  Labs reviewed.  Creatinine 1.9 unchanged.  Continue same 

treatment plan.  Per consultants.  Overall poor prognosis since the patient 

remains on pressors and her pulmonary status is worsening.


August 29: Remains intubated.  Labs reviewed.  Creatinine 1.9.  Blood pressure 

systolic 90s.  Continue per consultants.


August 28: Remains intubated.  Labs reviewed.  Serum creatinine lower to 2.  

Vancomycin level lower.  Remains hypotensive on pressors.  Will increase 

midodrine to 10 mg every 8 hours.  Continue per consultants.  Continue to 

monitor renal parameters.


August 27: Patient now in ICU.  Intubated.  On pressors.  Labs reviewed.  Will 

increase midodrine.  Aim to keep blood pressure over 100 systolic.  Will give 

albumin bolus.  Will check vancomycin level which was elevated when checked 

previously on August 24.  Will monitor renal parameters.  Continue per 

consultants.





Subjective


ROS Limited/Unobtainable:  Yes





Objective


Objective





Last 24 Hour Vital Signs








  Date Time  Temp Pulse Resp B/P (MAP) Pulse Ox O2 Delivery O2 Flow Rate FiO2


 


9/7/20 09:00    99/47    


 


9/7/20 09:00  76 26 99/47 (64) 93   


 


9/7/20 08:30  69 26 101/46 (64) 94   


 


9/7/20 08:00        80


 


9/7/20 08:00    104/55    


 


9/7/20 08:00 99.4 65 26 104/55 (71) 94   


 


9/7/20 07:45  70 26 104/53 (70) 94   


 


9/7/20 07:45    104/53    


 


9/7/20 07:30  69 26 98/51 (67) 94   


 


9/7/20 07:10  67 26     80


 


9/7/20 07:00  69 26 93/48 (63) 93   


 


9/7/20 07:00    93/48    


 


9/7/20 06:45  62 26 95/48 (64) 93   


 


9/7/20 06:30  66 26 96/50 (65) 92   


 


9/7/20 06:00  67 26 102/50 (67) 93   


 


9/7/20 06:00    99/52    


 


9/7/20 05:30  78 28 95/46 (62) 94   


 


9/7/20 05:15  71 26     80


 


9/7/20 05:00    95/50    


 


9/7/20 05:00  67 27 95/51 (66) 95   


 


9/7/20 04:00 99.5 84 29 97/56 (70) 97   


 


9/7/20 04:00      Mechanical Ventilator  





      Mechanical Ventilator  


 


9/7/20 04:00  65      


 


9/7/20 04:00    97/56    


 


9/7/20 04:00        80


 


9/7/20 03:54  78 30     70


 


9/7/20 03:47 99.7       


 


9/7/20 03:30  72 27 91/48 (62) 91   


 


9/7/20 03:00 99.9 85 24 92/49 (63) 96   


 


9/7/20 03:00    92/49    


 


9/7/20 02:30  64 26 96/43 (60) 93   


 


9/7/20 02:00  66 26 90/39 (56) 93   


 


9/7/20 02:00    90/39    


 


9/7/20 01:30  64 26 92/43 (59) 94   


 


9/7/20 01:11  63 26     60


 


9/7/20 01:00    93/41    


 


9/7/20 01:00  64 26 93/41 (58) 94   


 


9/7/20 00:30  65 26 92/43 (59) 95   


 


9/7/20 00:15  66 26 97/42 (60) 97   


 


9/7/20 00:00      Mechanical Ventilator  





      Mechanical Ventilator  


 


9/7/20 00:00        70


 


9/7/20 00:00 98.7 68 26 97/41 (59) 96   


 


9/7/20 00:00    97/42    


 


9/7/20 00:00  61      


 


9/6/20 23:48  80 26     70


 


9/6/20 23:45  79 26 89/40 (56) 95   


 


9/6/20 23:30  81 26 93/47 (62) 97   


 


9/6/20 23:15  68 26 103/44 (63) 98   


 


9/6/20 23:00  75 24 99/51 (67) 98   


 


9/6/20 23:00    93/47    


 


9/6/20 22:45  72 26 102/44 (63) 98   


 


9/6/20 22:30  67 26 98/45 (62) 98   


 


9/6/20 22:00  64 26 111/59 (76) 100   


 


9/6/20 21:52  60 26     100


 


9/6/20 21:45  61 26 108/47 (67) 99   


 


9/6/20 21:30  65 26 111/54 (73) 100   


 


9/6/20 21:15  62 26 102/48 (66) 99   


 


9/6/20 21:00  64 26 105/47 (66) 100   


 


9/6/20 21:00    111/59    


 


9/6/20 20:30  63 26 104/52 (69) 100   


 


9/6/20 20:00        100


 


9/6/20 20:00  64      


 


9/6/20 20:00    108/56    


 


9/6/20 20:00    105/47    


 


9/6/20 20:00 99.0 61 26 108/46 (66) 100   


 


9/6/20 20:00      Mechanical Ventilator  





      Mechanical Ventilator  


 


9/6/20 19:56  63 26     100


 


9/6/20 19:47    102/51    


 


9/6/20 19:30  70 24 96/47 (63) 100   


 


9/6/20 19:00    102/51    


 


9/6/20 19:00  62 26 102/51 (68) 100   


 


9/6/20 18:30  68 26 114/42 (66) 100   


 


9/6/20 18:00  72 25 96/44 (61) 100   


 


9/6/20 18:00    96/44    


 


9/6/20 17:30  68 25 100/61 (74) 100   


 


9/6/20 17:00    94/43    


 


9/6/20 17:00  65 26 94/43 (60) 100   


 


9/6/20 16:30  68 26     100


 


9/6/20 16:30  62 26 100/45 (63) 100   


 


9/6/20 16:00        100


 


9/6/20 16:00  62      


 


9/6/20 16:00 98.3 68 26 98/44 (62) 97   


 


9/6/20 16:00      Mechanical Ventilator  





      Mechanical Ventilator  


 


9/6/20 16:00    98/44    


 


9/6/20 15:30  62 26 99/44 (62) 98   


 


9/6/20 15:00    104/43    


 


9/6/20 15:00  63 26 107/92 (97) 100   


 


9/6/20 14:51  61 26     100


 


9/6/20 14:30  78 26 92/40 (57) 98   


 


9/6/20 14:00  71 26 96/41 (59) 99   


 


9/6/20 14:00    96/41    


 


9/6/20 13:30  72 27 91/44 (60) 100   


 


9/6/20 13:00    98/45    


 


9/6/20 13:00  68 24 98/45 (62) 100   


 


9/6/20 12:43  67 26     100


 


9/6/20 12:30  61 26 99/44 (62) 100   


 


9/6/20 12:00      Mechanical Ventilator  





      Mechanical Ventilator  


 


9/6/20 12:00        100


 


9/6/20 12:00    102/43    


 


9/6/20 12:00 98.4 70 25 102/43 (62) 100   


 


9/6/20 12:00  61      


 


9/6/20 11:30  67 26 104/44 (64) 100   


 


9/6/20 11:01  63 26     100


 


9/6/20 11:00  61 26 107/47 (67) 100   


 


9/6/20 11:00    107/47    


 


9/6/20 10:30  61 26 96/44 (61) 100   


 


9/6/20 10:00    104/46    


 


9/6/20 10:00  59 26 104/46 (65) 100   

















Intake and Output  


 


 9/6/20 9/7/20





 19:00 07:00


 


Intake Total 1211.300 ml 882.168 ml


 


Output Total 755 ml 1000 ml


 


Balance 456.300 ml -117.832 ml


 


  


 


Free Water 110 ml 120 ml


 


IV Total 651.300 ml 162.168 ml


 


Tube Feeding 450 ml 600 ml


 


Output Urine Total 755 ml 1000 ml











Current Medications








 Medications


  (Trade)  Dose


 Ordered  Sig/Didi


 Route


 PRN Reason  Start Time


 Stop Time Status Last Admin


Dose Admin


 


 Acetaminophen


  (Tylenol)  650 mg  Q4H  PRN


 GT


 FEVER  8/26/20 11:45


 9/25/20 11:44  9/7/20 03:17


 


 


 Chlorhexidine


 Gluconate


  (Beatrice-Hex 2%)  1 applic  DAILY@2000


 TOPIC


   8/31/20 20:00


 11/29/20 19:59  9/6/20 19:45


 


 


 Clotrimazole


  (Lotrimin)  1 applic  Q12HR


 TOPIC


   8/30/20 13:00


 11/28/20 12:59  9/7/20 09:02


 


 


 Dextrose


  (Dextrose 50%)  25 ml  Q30M  PRN


 IV


 Hypoglycemia  8/26/20 11:30


 11/20/20 11:29   


 


 


 Dextrose


  (Dextrose 50%)  50 ml  Q30M  PRN


 IV


 Hypoglycemia  8/26/20 11:30


 11/20/20 11:29   


 


 


 Dopamine HCl/


 Dextrose  250 ml @ 0


 mls/hr  Q24H


 IV


   9/4/20 11:15


 12/3/20 11:14  9/6/20 19:47


 


 


 Hydrocortisone


  (Solu-CORTEF)  100 mg  EVERY 8  HOURS


 IV


   8/30/20 14:00


 11/28/20 13:59  9/7/20 04:48


 


 


 Levothyroxine


 Sodium


  (Synthroid)  75 mcg  DAILY


 GT


   8/27/20 09:00


 9/22/20 08:59  9/7/20 08:51


 


 


 Lorazepam


  (Ativan 2mg/ml


 1ml)  2 mg  Q2H  PRN


 IV


 For Anxiety  9/4/20 17:30


 9/11/20 17:29  9/4/20 17:37


 


 


 Meropenem 1 gm/


 Sodium Chloride  55 ml @ 


 110 mls/hr  Q8H


 IVPB


   9/6/20 12:00


 9/11/20 11:59  9/7/20 04:26


 


 


 Midodrine


  (Pro-Amatine)  10 mg  Q8HR


 GT


   8/28/20 14:00


 11/26/20 13:59  9/7/20 04:48


 


 


 Norepinephrine


 Bitartrate 16 mg/


 Dextrose  500 ml @ 0


 mls/hr  Q24H


 IV


   8/31/20 07:45


 9/29/20 12:59  9/4/20 08:43


 


 


 Ondansetron HCl


  (Zofran)  4 mg  Q6H  PRN


 IVP


 Nausea & Vomiting  8/26/20 12:00


 9/21/20 11:59   


 


 


 Pantoprazole


  (Protonix)  40 mg  DAILY


 IV


   8/27/20 09:00


 9/22/20 08:59  9/7/20 08:51


 


 


 Phenylephrine HCl


 50 mg/Dextrose  250 ml @ 0


 mls/hr  Q24H  PRN


 IV


 For hypotension  8/29/20 17:45


 9/28/20 17:44  8/31/20 01:49


 


 


 Polyethylene


 Glycol


  (Miralax)  17 gm  DAILYPRN  PRN


 GT


 Constipation  8/26/20 12:00


 9/21/20 11:59   


 


 


 Valproic Acid


  (Depakene)  500 mg  EVERY 8  HOURS


 GT


   8/26/20 14:00


 9/21/20 21:59  9/7/20 04:48


 





Laboratory Tests


9/7/20 04:45: 


White Blood Count 18.8H, Red Blood Count 2.09L, Hemoglobin 7.0L, Hematocrit 

20.9L, Mean Corpuscular Volume 100H, Mean Corpuscular Hemoglobin 33.3H, Mean 

Corpuscular Hemoglobin Concent 33.3, Red Cell Distribution Width 14.9H, 

Platelet Count 71L, Mean Platelet Volume 9.1, Neutrophils (%) (Auto) , 

Lymphocytes (%) (Auto) , Monocytes (%) (Auto) , Eosinophils (%) (Auto) , 

Basophils (%) (Auto) , Neutrophils % (Manual) [Pending], Lymphocytes % (Manual) 

[Pending], Platelet Estimate [Pending], Platelet Morphology [Pending], Sodium 

Level 148H, Potassium Level 3.6, Chloride Level 119H, Carbon Dioxide Level 24, 

Anion Gap 5, Blood Urea Nitrogen 47H, Creatinine 1.2, Estimat Glomerular 

Filtration Rate 46.2, Glucose Level 129H, Calcium Level 8.2L


Height (Feet):  5


Height (Inches):  3.00


Weight (Pounds):  172


General Appearance:  no apparent distress


EENT:  other - On ventilator


Cardiovascular:  normal rate


Respiratory/Chest:  decreased breath sounds


Abdomen:  distended


Extremities:  other - Edematous











Lam Nino MD Sep 7, 2020 09:53

## 2020-09-07 NOTE — NUR
RESPIRATORY NOTE:

PT REMAINED STABLE ON CMV WITH CURRENT SETTINGS. SXN'D PRN WITH NO COMPLICATIONS. AIRWAY IS 
SECURE AND PATENT. NO S/S OF RESPIRATORY DISTRESS NOTED.

## 2020-09-07 NOTE — NUR
NURSE NOTES:

Bedside assessment performed, assessed pt with a FLACC scale of 0 noted. Pt observed to be 
resting comfortably in bed. VS obtained and remain stable at this time. Dopamine continues 
to infuse at 1 mcg/kg/min with no adverse effects noted. Respiratory status remains stable 
with no s/sx of acute distress noted, consistent with ventilator settings at this time. 
Neuro assessment remains consistent with previous physical assessment, no s/sx of seizures 
noted. Pt remains clean and dry. Pt repositioned for safety and comfort. Fall, Aspiration, 
Seizure and Skin precautions observed. Pt remains resting in bed; Bed remains in the lowest 
position with the safety wheels engaged, call light within reach, side rails up x3 and bed 
alarm activated. Will continue plan of care. Will continue to monitor.

## 2020-09-07 NOTE — NUR
NURSE HAND-OFF REPORT: 



Latest Vital Signs: Temperature 99.1 , Pulse 73 , B/P 98 /48 , Respiratory Rate 26 , O2 SAT 
100 , Mechanical Ventilator, FiO2 90%.  

Vital Sign Comment: Currently on 1 PRBC transfusion. 



EKG Rhythm: Sinus Rhythm

Rhythm change?: N  

On continuous GTF as tolerated. 

Latest Momin Fall Score: 50  

Fall Risk: High Risk 

Safety Measures: Call light Within Reach, Bed Alarm Zone 2, Side Rails Side Rails x3, Bed 
position Low and Locked.

Fall Precautions: 

Yellow Socks

Yellow Gown

Door Sign

Patient Fall Education

Contact isolation endorsed

Report given to Lucie Floyd RN.

## 2020-09-07 NOTE — NUR
NURSE NOTES:

Pt repositioned, Oral care provided, no distress noted at this time. Dr Mcmahon assessing 
pt at bedside. Orders placed to collect urine and sputum for culture.

## 2020-09-07 NOTE — NUR
NURSE NOTES:

Dr Nino assessing pt at bedside - all labs reviewed for today - discussed Hgb level - 
will f/u with Dr Cherry for further evaluation. 



Pt repositioned in bed, no acute distress noted at this time. Will continue to monitor.

## 2020-09-07 NOTE — NUR
NURSE NOTES:

received pt with Dx PNA, Hx down syndrome, opened eyes  to tactile stimulation, SR on the 
monitor  Bp been supported with Dopamine drip at 1 mcg/kg/min,infusing  to CARLOS PICC line, 
site with drsg dry and intact, orally intubated on ac mode, Suctioned tn tk whitish to beige 
secretions moderate in amt. Will continue to monitor.

## 2020-09-07 NOTE — NUR
NURSE HAND-OFF REPORT: 



Latest Vital Signs: Temperature 99.6 , Pulse 79 , B/P 95 /51 , Respiratory Rate 28 , O2 SAT 
92 , Mechanical Ventilator, O2 Flow Rate 15.0 .  

Vital Sign Comment: stable on 1 mcg/kg/min of dopa



EKG Rhythm: Sinus Rhythm

Rhythm change?: N 

MD Notified?: n/a

MD Response: n/a



Latest Momin Fall Score: 50  

Fall Risk: High Risk 

Safety Measures: Call light Within Reach, Bed Alarm Zone 2, Side Rails Side Rails x3, Bed 
position Low and Locked.

Fall Precautions: 

Yellow Socks

Yellow Gown

Door Sign

Patient Fall Education



Report given to LAYTON Oneil.

## 2020-09-07 NOTE — NUR
NURSE NOTES:

Left message for Dr Cherry with recent ABG results. Awaiting call back for new vent setting 
orders. 

Pt repositioned, no distress noted.

## 2020-09-07 NOTE — NUR
NURSE NOTES:

Suctioned tn tk whitish secretions moderate in amt. Turned q 2hrs prn with good skin care 
done.

## 2020-09-07 NOTE — INTERNAL MED PROGRESS NOTE
Subjective


Physician Name


Tyson Hung


Attending Physician


Chano Cherry MD





Current Medications








 Medications


  (Trade)  Dose


 Ordered  Sig/Didi


 Route


 PRN Reason  Start Time


 Stop Time Status Last Admin


Dose Admin


 


 Acetaminophen


  (Tylenol)  650 mg  Q4H  PRN


 GT


 FEVER  8/26/20 11:45


 9/25/20 11:44  9/7/20 03:17


 


 


 Chlorhexidine


 Gluconate


  (Beatrice-Hex 2%)  1 applic  DAILY@2000


 TOPIC


   8/31/20 20:00


 11/29/20 19:59  9/6/20 19:45


 


 


 Clotrimazole


  (Lotrimin)  1 applic  Q12HR


 TOPIC


   8/30/20 13:00


 11/28/20 12:59  9/7/20 09:02


 


 


 Dextrose


  (Dextrose 50%)  25 ml  Q30M  PRN


 IV


 Hypoglycemia  8/26/20 11:30


 11/20/20 11:29   


 


 


 Dextrose


  (Dextrose 50%)  50 ml  Q30M  PRN


 IV


 Hypoglycemia  8/26/20 11:30


 11/20/20 11:29   


 


 


 Dopamine HCl/


 Dextrose  250 ml @ 0


 mls/hr  Q24H


 IV


   9/4/20 11:15


 12/3/20 11:14  9/6/20 19:47


 


 


 Hydrocortisone


  (Solu-CORTEF)  100 mg  EVERY 8  HOURS


 IV


   8/30/20 14:00


 11/28/20 13:59  9/7/20 04:48


 


 


 Levothyroxine


 Sodium


  (Synthroid)  75 mcg  DAILY


 GT


   8/27/20 09:00


 9/22/20 08:59  9/7/20 08:51


 


 


 Lorazepam


  (Ativan 2mg/ml


 1ml)  2 mg  Q2H  PRN


 IV


 For Anxiety  9/4/20 17:30


 9/11/20 17:29  9/4/20 17:37


 


 


 Meropenem 1 gm/


 Sodium Chloride  55 ml @ 


 110 mls/hr  Q8H


 IVPB


   9/6/20 12:00


 9/11/20 11:59  9/7/20 11:59


 


 


 Midodrine


  (Pro-Amatine)  10 mg  Q8HR


 GT


   8/28/20 14:00


 11/26/20 13:59  9/7/20 04:48


 


 


 Norepinephrine


 Bitartrate 16 mg/


 Dextrose  500 ml @ 0


 mls/hr  Q24H


 IV


   8/31/20 07:45


 9/29/20 12:59  9/4/20 08:43


 


 


 Ondansetron HCl


  (Zofran)  4 mg  Q6H  PRN


 IVP


 Nausea & Vomiting  8/26/20 12:00


 9/21/20 11:59   


 


 


 Pantoprazole


  (Protonix)  40 mg  DAILY


 IV


   8/27/20 09:00


 9/22/20 08:59  9/7/20 08:51


 


 


 Phenylephrine HCl


 50 mg/Dextrose  250 ml @ 0


 mls/hr  Q24H  PRN


 IV


 For hypotension  8/29/20 17:45


 9/28/20 17:44  8/31/20 01:49


 


 


 Polyethylene


 Glycol


  (Miralax)  17 gm  DAILYPRN  PRN


 GT


 Constipation  8/26/20 12:00


 9/21/20 11:59   


 


 


 Valproic Acid


  (Depakene)  500 mg  EVERY 8  HOURS


 GT


   8/26/20 14:00


 9/21/20 21:59  9/7/20 04:48


 








Allergies:  


Coded Allergies:  


     No Known Allergies (Unverified , 1/8/19)


Subjective


in ICU, remained intubated on the vent, open eyes, unable to follow command. WBC

: 18.8, Hgb: 7.0





Objective





Last Vital Signs








  Date Time  Temp Pulse Resp B/P (MAP) Pulse Ox O2 Delivery O2 Flow Rate FiO2


 


9/7/20 14:00  78 28 92/53 (66) 91   


 


9/7/20 12:00      Mechanical Ventilator  





      Mechanical Ventilator  


 


9/7/20 12:00 99.6       


 


9/7/20 12:00        80











Laboratory Tests








Test


  9/7/20


04:45 9/7/20


10:09 9/7/20


13:38


 


White Blood Count


  18.8 K/UL


(4.8-10.8)  H 


  


 


 


Red Blood Count


  2.09 M/UL


(4.20-5.40)  L 


  


 


 


Hemoglobin


  7.0 G/DL


(12.0-16.0)  L 


  


 


 


Hematocrit


  20.9 %


(37.0-47.0)  L 


  


 


 


Mean Corpuscular Volume


  100 FL (80-99)


H 


  


 


 


Mean Corpuscular Hemoglobin


  33.3 PG


(27.0-31.0)  H 


  


 


 


Mean Corpuscular Hemoglobin


Concent 33.3 G/DL


(32.0-36.0) 


  


 


 


Red Cell Distribution Width


  14.9 %


(11.6-14.8)  H 


  


 


 


Platelet Count


  71 K/UL


(150-450)  L 


  


 


 


Mean Platelet Volume


  9.1 FL


(6.5-10.1) 


  


 


 


Neutrophils (%) (Auto)


  % (45.0-75.0)


  


  


 


 


Lymphocytes (%) (Auto)


  % (20.0-45.0)


  


  


 


 


Monocytes (%) (Auto)  % (1.0-10.0)    


 


Eosinophils (%) (Auto)  % (0.0-3.0)    


 


Basophils (%) (Auto)  % (0.0-2.0)    


 


Differential Total Cells


Counted 100  


  


  


 


 


Neutrophils % (Manual) 86 % (45-75)  H  


 


Lymphocytes % (Manual) 5 % (20-45)  L  


 


Monocytes % (Manual) 9 % (1-10)    


 


Eosinophils % (Manual) 0 % (0-3)    


 


Basophils % (Manual) 0 % (0-2)    


 


Band Neutrophils 0 % (0-8)    


 


Platelet Estimate Decreased  L  


 


Platelet Morphology Normal    


 


Hypochromasia 3+    


 


Anisocytosis 1+    


 


Spherocytes 2+    


 


Sodium Level


  148 MMOL/L


(136-145)  H 


  


 


 


Potassium Level


  3.6 MMOL/L


(3.5-5.1) 


  


 


 


Chloride Level


  119 MMOL/L


()  H 


  


 


 


Carbon Dioxide Level


  24 MMOL/L


(21-32) 


  


 


 


Anion Gap


  5 mmol/L


(5-15) 


  


 


 


Blood Urea Nitrogen


  47 mg/dL


(7-18)  H 


  


 


 


Creatinine


  1.2 MG/DL


(0.55-1.30) 


  


 


 


Estimat Glomerular Filtration


Rate 46.2 mL/min


(>60) 


  


 


 


Glucose Level


  129 MG/DL


()  H 


  


 


 


Calcium Level


  8.2 MG/DL


(8.5-10.1)  L 


  


 


 


Iron Level


  


  18 ug/dL


()  L 


 


 


Total Iron Binding Capacity


  


  112 ug/dL


(250-450)  L 


 


 


Percent Iron Saturation  16 % (15-50)   


 


Unsaturated Iron Binding


  


  94 ug/dL


(112-346)  L 


 


 


Ferritin  Pending   


 


Vitamin B12 Level


  


  989 PG/ML


(193-986)  H 


 


 


Folate


  


  10.0 NG/ML


(8.6-58.9) 


 


 


Arterial Blood pH


  


  


  7.466


(7.350-7.450)


 


Arterial Blood Partial


Pressure CO2 


  


  28.0 mmHg


(35.0-45.0)  L


 


Arterial Blood Partial


Pressure O2 


  


  59.6 mmHg


(75.0-100.0)  L


 


Arterial Blood HCO3


  


  


  19.7 mmol/L


(22.0-26.0)  L


 


Arterial Blood Oxygen


Saturation 


  


  91.1 %


()  L


 


Arterial Blood Base Excess   -3.4 (-2-2)  L


 


Cole Test   Positive  

















Intake and Output  


 


 9/6/20 9/7/20





 19:00 07:00


 


Intake Total 1211.300 ml 882.168 ml


 


Output Total 755 ml 1000 ml


 


Balance 456.300 ml -117.832 ml


 


  


 


Free Water 110 ml 120 ml


 


IV Total 651.300 ml 162.168 ml


 


Tube Feeding 450 ml 600 ml


 


Output Urine Total 755 ml 1000 ml








Objective


General: remain intubated, unable to follow command, open eyes.


HEENT: NCAT, sclera anicteric, PERRL, ET Tube.


Neck: Supple, no significant jugular venous distention, 


Lungs: mechanical breath sounds decreased air at the bases,  no Wheeze or Rales.


Heart: Regular rate and rhythm, normal S1/S2, no murmurs/gallops


Abdomen: soft, not tender, not distended. + bowel sounds, Morbid Obesity, PEG 

site intact.


Extremities: No Cyanosis , clubbing,  upper extremities less edema. left upper 

extremity PICC line.


Neuro: limited secondary to patient status, unable to follow command, bilateral 

upper and lower extremities contracted.





Assessment/Plan


Assessment/Plan


Problem List:  


(1) HCAP (healthcare-associated pneumonia)


(2) Sepsis


(3) Down's syndrome


(4) Dysphagia S/P PEG


(5) Seizure disorder


(6) Hypothyroidism


(7) Acute Hypoxemic respiratory failure


(8) Persistent, high grade bacteremia-  r/o endocarditis


(9) DAVID





Plan: 


Tolerated tube feeding @ 50 cc/hr


Follow-up with laboratory and cultures


on Dopamine drip 


Hydrocortisone 100 mg IV every 8


Cont Meropenem#12/10-14 (abx d #16) given resp decompensation


Transfuse PRBC


GI consult











Tyson Hung MD Sep 7, 2020 14:14

## 2020-09-07 NOTE — NUR
NURSE NOTES:

Pt received from LAY[facility] Yao. Pt is awake in bed, opens eyes spontaneously and makes eye 
contact when called by name but unable to follow simple commands. pupils are equal and round 
4 mm bilaterally with brisk rxn to light. Pt noted in SR to cardiac monitor. radial pulses 
2+ and dorsalis pedis pulses 1+. 2+ pitting edema noted to bilat upper extremities and feet. 
Temp 99.4 F ax. Pt is mechanically ventilated with a 7.5 ETT noted 22 cm at the lip line 
with following settings: AC 26  FiO2 80% Peep 10. All lung lobes sound diminished upon 
auscultation. Abd appears slightly distended and firm with active bowel sounds to all 
quadrants. Pt has a GT clamped at this time for 0900 Synthroid administration. Will resume 1 
hr after administration. F/C noted draining yellow, clear urine. Skin alterations noted. Pt 
on STEPH mattress. Pt has a CARLOS PICC with dry and intact dressing running dopamine gtt at 1 
mcg/kg/min. Bed in lowest position, alarm on, side rails up x 2 and padded per seizure 
precaution. Call light within reach. Will continue to monitor pt and f/u with Dr Cherry 
regarding Hgb and possible order for ABG.

## 2020-09-07 NOTE — GENERAL PROGRESS NOTE
Progress Note


Progress Note


Pt is critically ill in ICU.  She needs urgently blood transfusion.  She is 

under the care of public guardian, who defers the consent for blood transfusion 

to medical staff taking care her.











Chano Cherry MD Sep 7, 2020 13:32

## 2020-09-07 NOTE — CARDIOLOGY PROGRESS NOTE
Assessment/Plan


Assessment/Plan


pneumonia, severe hypotension,on pressors, dopamine is weaned to 1 mcg/kg/hour





Subjective


Subjective


The patient is more alert today and looking around





Objective





Last 24 Hour Vital Signs








  Date Time  Temp Pulse Resp B/P (MAP) Pulse Ox O2 Delivery O2 Flow Rate FiO2


 


9/7/20 19:30  73 26 98/48 (65) 100   


 


9/7/20 19:00    101/50    


 


9/7/20 19:00  73 25 101/50 (67) 100   


 


9/7/20 18:53  75 27     90


 


9/7/20 18:45  69 24 97/51 (66) 100   


 


9/7/20 18:30 99.1 67 26 99/51 (67) 100   


 


9/7/20 18:15 99.4 76 25 99/53 (68) 100   


 


9/7/20 18:00    97/50    


 


9/7/20 18:00  70 25 97/50 (66) 99   


 


9/7/20 17:30  69 26 96/50 (65) 99   


 


9/7/20 17:00    87/74    


 


9/7/20 17:00  74 25 87/74 (78) 99   


 


9/7/20 16:54  67 26     90


 


9/7/20 16:30  68 26 98/50 (66) 99   


 


9/7/20 16:06  78      


 


9/7/20 16:00 99.2 73 26 98/47 (64) 99   


 


9/7/20 16:00      Mechanical Ventilator  





      Mechanical Ventilator  


 


9/7/20 16:00    98/47    


 


9/7/20 16:00        90


 


9/7/20 15:30  74 23 91/46 (61) 99   


 


9/7/20 15:16        90


 


9/7/20 15:14  74 26     90


 


9/7/20 15:00    107/61    


 


9/7/20 15:00  90 27 107/61 (76) 92   





  90      


 


9/7/20 14:30  79 28 95/51 (66) 92   


 


9/7/20 14:00  78 28 92/53 (66) 91   


 


9/7/20 14:00    92/53    


 


9/7/20 13:45        90


 


9/7/20 13:30  77 26 93/77 (82) 93   


 


9/7/20 13:20  75 26     80


 


9/7/20 13:00    98/50    


 


9/7/20 13:00  70 26 98/50 (66) 93   


 


9/7/20 12:30  71 26 96/50 (65) 95   


 


9/7/20 12:00      Mechanical Ventilator  





      Mechanical Ventilator  


 


9/7/20 12:00 99.6 65 26 95/50 (65) 94   


 


9/7/20 12:00  66      


 


9/7/20 12:00        80


 


9/7/20 12:00    95/50    


 


9/7/20 11:30  67 26 98/50 (66) 94   


 


9/7/20 11:06  86 30     80


 


9/7/20 11:00    93/48    


 


9/7/20 11:00  66 26 93/48 (63) 93   


 


9/7/20 10:30  68 26 99/53 (68) 92   


 


9/7/20 10:00  70 26 90/64 (73) 93   


 


9/7/20 10:00    90/64    


 


9/7/20 09:30  77 27 92/48 (63) 92   


 


9/7/20 09:15  70 26     80


 


9/7/20 09:00    99/47    


 


9/7/20 09:00  76 26 99/47 (64) 93   


 


9/7/20 08:30  69 26 101/46 (64) 94   


 


9/7/20 08:00        80


 


9/7/20 08:00    104/55    


 


9/7/20 08:00 99.4 65 26 104/55 (71) 94   


 


9/7/20 08:00      Mechanical Ventilator  





      Mechanical Ventilator  


 


9/7/20 08:00  74      


 


9/7/20 07:45  70 26 104/53 (70) 94   


 


9/7/20 07:45    104/53    


 


9/7/20 07:30  69 26 98/51 (67) 94   


 


9/7/20 07:10  67 26     80


 


9/7/20 07:00  69 26 93/48 (63) 93   


 


9/7/20 07:00    93/48    


 


9/7/20 06:45  62 26 95/48 (64) 93   


 


9/7/20 06:30  66 26 96/50 (65) 92   


 


9/7/20 06:00  67 26 102/50 (67) 93   


 


9/7/20 06:00    99/52    


 


9/7/20 05:30  78 28 95/46 (62) 94   


 


9/7/20 05:15  71 26     80


 


9/7/20 05:00    95/50    


 


9/7/20 05:00  67 27 95/51 (66) 95   


 


9/7/20 04:00 99.5 84 29 97/56 (70) 97   


 


9/7/20 04:00      Mechanical Ventilator  





      Mechanical Ventilator  


 


9/7/20 04:00  65      


 


9/7/20 04:00    97/56    


 


9/7/20 04:00        80


 


9/7/20 03:54  78 30     70


 


9/7/20 03:47 99.7       


 


9/7/20 03:30  72 27 91/48 (62) 91   


 


9/7/20 03:00 99.9 85 24 92/49 (63) 96   


 


9/7/20 03:00    92/49    


 


9/7/20 02:30  64 26 96/43 (60) 93   


 


9/7/20 02:00  66 26 90/39 (56) 93   


 


9/7/20 02:00    90/39    


 


9/7/20 01:30  64 26 92/43 (59) 94   


 


9/7/20 01:11  63 26     60


 


9/7/20 01:00    93/41    


 


9/7/20 01:00  64 26 93/41 (58) 94   


 


9/7/20 00:30  65 26 92/43 (59) 95   


 


9/7/20 00:15  66 26 97/42 (60) 97   


 


9/7/20 00:00      Mechanical Ventilator  





      Mechanical Ventilator  


 


9/7/20 00:00        70


 


9/7/20 00:00 98.7 68 26 97/41 (59) 96   


 


9/7/20 00:00    97/42    


 


9/7/20 00:00  61      


 


9/6/20 23:48  80 26     70


 


9/6/20 23:45  79 26 89/40 (56) 95   


 


9/6/20 23:30  81 26 93/47 (62) 97   


 


9/6/20 23:15  68 26 103/44 (63) 98   


 


9/6/20 23:00  75 24 99/51 (67) 98   


 


9/6/20 23:00    93/47    


 


9/6/20 22:45  72 26 102/44 (63) 98   


 


9/6/20 22:30  67 26 98/45 (62) 98   


 


9/6/20 22:00  64 26 111/59 (76) 100   


 


9/6/20 21:52  60 26     100


 


9/6/20 21:45  61 26 108/47 (67) 99   








General Appearance:  on vent


EENT:  PERRL/EOMI


Neck:  no JVD


Rhythm:  SB


Cardiovascular:  regular rhythm


Respiratory/Chest:  rhonchi - bilaterally


Abdomen:  soft


Extremities:  other - generalized edema











Intake and Output  


 


 9/6/20 9/7/20





 19:00 07:00


 


Intake Total 1211.300 ml 882.168 ml


 


Output Total 755 ml 1000 ml


 


Balance 456.300 ml -117.832 ml


 


  


 


Free Water 110 ml 120 ml


 


IV Total 651.300 ml 162.168 ml


 


Tube Feeding 450 ml 600 ml


 


Output Urine Total 755 ml 1000 ml











Laboratory Tests








Test


  9/7/20


04:45 9/7/20


10:09 9/7/20


13:38


 


White Blood Count


  18.8 K/UL


(4.8-10.8)  H 


  


 


 


Red Blood Count


  2.09 M/UL


(4.20-5.40)  L 


  


 


 


Hemoglobin


  7.0 G/DL


(12.0-16.0)  L 


  


 


 


Hematocrit


  20.9 %


(37.0-47.0)  L 


  


 


 


Mean Corpuscular Volume


  100 FL (80-99)


H 


  


 


 


Mean Corpuscular Hemoglobin


  33.3 PG


(27.0-31.0)  H 


  


 


 


Mean Corpuscular Hemoglobin


Concent 33.3 G/DL


(32.0-36.0) 


  


 


 


Red Cell Distribution Width


  14.9 %


(11.6-14.8)  H 


  


 


 


Platelet Count


  71 K/UL


(150-450)  L 


  


 


 


Mean Platelet Volume


  9.1 FL


(6.5-10.1) 


  


 


 


Neutrophils (%) (Auto)


  % (45.0-75.0)


  


  


 


 


Lymphocytes (%) (Auto)


  % (20.0-45.0)


  


  


 


 


Monocytes (%) (Auto)  % (1.0-10.0)    


 


Eosinophils (%) (Auto)  % (0.0-3.0)    


 


Basophils (%) (Auto)  % (0.0-2.0)    


 


Differential Total Cells


Counted 100  


  


  


 


 


Neutrophils % (Manual) 86 % (45-75)  H  


 


Lymphocytes % (Manual) 5 % (20-45)  L  


 


Monocytes % (Manual) 9 % (1-10)    


 


Eosinophils % (Manual) 0 % (0-3)    


 


Basophils % (Manual) 0 % (0-2)    


 


Band Neutrophils 0 % (0-8)    


 


Platelet Estimate Decreased  L  


 


Platelet Morphology Normal    


 


Hypochromasia 3+    


 


Anisocytosis 1+    


 


Spherocytes 2+    


 


Sodium Level


  148 MMOL/L


(136-145)  H 


  


 


 


Potassium Level


  3.6 MMOL/L


(3.5-5.1) 


  


 


 


Chloride Level


  119 MMOL/L


()  H 


  


 


 


Carbon Dioxide Level


  24 MMOL/L


(21-32) 


  


 


 


Anion Gap


  5 mmol/L


(5-15) 


  


 


 


Blood Urea Nitrogen


  47 mg/dL


(7-18)  H 


  


 


 


Creatinine


  1.2 MG/DL


(0.55-1.30) 


  


 


 


Estimat Glomerular Filtration


Rate 46.2 mL/min


(>60) 


  


 


 


Glucose Level


  129 MG/DL


()  H 


  


 


 


Calcium Level


  8.2 MG/DL


(8.5-10.1)  L 


  


 


 


Iron Level


  


  18 ug/dL


()  L 


 


 


Total Iron Binding Capacity


  


  112 ug/dL


(250-450)  L 


 


 


Percent Iron Saturation  16 % (15-50)   


 


Unsaturated Iron Binding


  


  94 ug/dL


(112-346)  L 


 


 


Ferritin


  


  732 NG/ML


(8-388)  H 


 


 


Vitamin B12 Level


  


  989 PG/ML


(193-986)  H 


 


 


Folate


  


  10.0 NG/ML


(8.6-58.9) 


 


 


Arterial Blood pH


  


  


  7.466


(7.350-7.450)


 


Arterial Blood Partial


Pressure CO2 


  


  28.0 mmHg


(35.0-45.0)  L


 


Arterial Blood Partial


Pressure O2 


  


  59.6 mmHg


(75.0-100.0)  L


 


Arterial Blood HCO3


  


  


  19.7 mmol/L


(22.0-26.0)  L


 


Arterial Blood Oxygen


Saturation 


  


  91.1 %


()  L


 


Arterial Blood Base Excess   -3.4 (-2-2)  L


 


Cole Test   Positive  

















Verito Barnett MD Sep 7, 2020 21:45

## 2020-09-07 NOTE — NUR
NURSE NOTES:



Received patient in bed. Orally intubated, vent dependent. Able to open eyes, non verbal. No 
facial grimace, no respiratory distress. On continuous GTF per order. Valle cath inplace. 
Contact isolation observed. Will continue plan of care.

## 2020-09-07 NOTE — NUR
RESPIRATORY NOTE:

PT RECEIVED STABLE ON CURRENT SETTINGS. ACVC+: 26, 500, 80%, +10. ALARMS ARE ON AND AUDIBLE. 
NO S/S OF RESPIRATORY DISTRESS ARE NOTED AT THIS TIME.

## 2020-09-07 NOTE — INFECTIOUS DISEASES PROG NOTE
Assessment/Plan








ASSESSMENT:


sp code blue 8/26





Septic Shock; SP


Fever, SP


Leukocytosis; SP


  -8/26 u/a no pyuria





Pneumonia.- COVID 19 neg x3


   Acute hypoxic resp failure on VM> NRB 15l 100%; hypoxic on ABG> now VDRF 8/26

- Fio2 80% >100% 8/28> 60% 9/1 >80% 9/2


         -9/1 sp cx Neg


         8/31 CXR: Extensive bilateral interstitial and airspace disease 

appears similar to the prior exam. Moderate to large bilateral pleural 

effusions appear unchanged.


   8/28 CXR: Increasing left upper lobe dense consolidation and likely 

increasing bilateral pleural fluid. Persistent diffuse dense consolidation 

elsewhere


            -8/26 sp cx normal resp lilliam


             -8/25 CXR: Increased atelectasis of the right lung, since prior 

exam of 3 days earlier. New or increased right pleural effusion. Increased left 

basilar consolidation and/or pleural fluid 


          -COVID Rapid PCR neg 8/23, 8/23, 8/26


          -8/22 spc x Group G strep


          -8/22 CXR:   Reduced lung volumes.  Patchy bilateral predominantly 

interstitial  pulmonary opacities.  Could be from edema and/or pneumonia.  

There is a broader differential.


 


        -legionella ag urine, blasto ab, Histo ab, HIV ab screen neg





Persistent, high grade bacteremia-


     -8/22 Bcx 4/4 sets S. haemolyticus; 8/23 Bcx 3/4 S/ epi; 8/27 Bcx 1.4 S. 

warnerri; 8/29 Bcx Neg


     -2d echo: no vegetaions seen





          ua/ wbc 10-15, nit neg, leuk +1; ucx Neg





DAVID; 


 -supratherapeutic vanco levels





-Seizure disorder.


- Hypothyroidism.


- Down syndrome.





History of PEG tube placement.


[facility] resident





PLAN:





-repeat blood Cx sputum and Urine Cx





Cont Meropenem#12/10-14 (abx d #16) given resp decompensation


   -9/2 SP MIcafungin #7, Linezolid #5





f/u Repeat BCx given persistent bacteremia


CT chest when stable enough, not currently given on FiO2 100% and pressors


If pleural effusions, should be tapped to r/o empyema, send for bacterial cx as 

well as cell count and diff


F/u cx of exudate around G-tube


Trend GIB (black stools)





          9/4/20 SP Daptomycin #11


   8/28 SP Azithromycin #7/7


   8/26 SP Ceftriaxone #2


   8/25 SP IV Vancomycin #4, Zosyn #4


   8/22 SP Cefepime x1, Flagyl x1





- Monitor CBC, BMP.


-f/u Cocci ab, CrAg


.f/u  Repeat cx


- COVID neg x3


- Monitor chest x-ray.


- Monitor the patient's clinical course and labs.  Based on those, we will do 

further recommendation.





Thank you, Dr. Cherry, for allowing me to participate in the care of


this patient.  I will follow the patient with you at this


hospitalization.








Discussed with RN





Subjective


Allergies:  


Coded Allergies:  


     No Known Allergies (Unverified , 1/8/19)


increasing wbcs


afebrile





Objective





Last 24 Hour Vital Signs








  Date Time  Temp Pulse Resp B/P (MAP) Pulse Ox O2 Delivery O2 Flow Rate FiO2


 


9/7/20 11:06  86 30     80


 


9/7/20 10:30  68 26 99/53 (68) 92   


 


9/7/20 10:00  70 26 90/64 (73) 93   


 


9/7/20 10:00    90/64    


 


9/7/20 09:30  77 27 92/48 (63) 92   


 


9/7/20 09:00    99/47    


 


9/7/20 09:00  76 26 99/47 (64) 93   


 


9/7/20 08:30  69 26 101/46 (64) 94   


 


9/7/20 08:00        80


 


9/7/20 08:00    104/55    


 


9/7/20 08:00 99.4 65 26 104/55 (71) 94   


 


9/7/20 08:00      Mechanical Ventilator  





      Mechanical Ventilator  


 


9/7/20 08:00  74      


 


9/7/20 07:45  70 26 104/53 (70) 94   


 


9/7/20 07:45    104/53    


 


9/7/20 07:30  69 26 98/51 (67) 94   


 


9/7/20 07:10  67 26     80


 


9/7/20 07:00  69 26 93/48 (63) 93   


 


9/7/20 07:00    93/48    


 


9/7/20 06:45  62 26 95/48 (64) 93   


 


9/7/20 06:30  66 26 96/50 (65) 92   


 


9/7/20 06:00  67 26 102/50 (67) 93   


 


9/7/20 06:00    99/52    


 


9/7/20 05:30  78 28 95/46 (62) 94   


 


9/7/20 05:15  71 26     80


 


9/7/20 05:00    95/50    


 


9/7/20 05:00  67 27 95/51 (66) 95   


 


9/7/20 04:00 99.5 84 29 97/56 (70) 97   


 


9/7/20 04:00      Mechanical Ventilator  





      Mechanical Ventilator  


 


9/7/20 04:00  65      


 


9/7/20 04:00    97/56    


 


9/7/20 04:00        80


 


9/7/20 03:54  78 30     70


 


9/7/20 03:47 99.7       


 


9/7/20 03:30  72 27 91/48 (62) 91   


 


9/7/20 03:00 99.9 85 24 92/49 (63) 96   


 


9/7/20 03:00    92/49    


 


9/7/20 02:30  64 26 96/43 (60) 93   


 


9/7/20 02:00  66 26 90/39 (56) 93   


 


9/7/20 02:00    90/39    


 


9/7/20 01:30  64 26 92/43 (59) 94   


 


9/7/20 01:11  63 26     60


 


9/7/20 01:00    93/41    


 


9/7/20 01:00  64 26 93/41 (58) 94   


 


9/7/20 00:30  65 26 92/43 (59) 95   


 


9/7/20 00:15  66 26 97/42 (60) 97   


 


9/7/20 00:00      Mechanical Ventilator  





      Mechanical Ventilator  


 


9/7/20 00:00        70


 


9/7/20 00:00 98.7 68 26 97/41 (59) 96   


 


9/7/20 00:00    97/42    


 


9/7/20 00:00  61      


 


9/6/20 23:48  80 26     70


 


9/6/20 23:45  79 26 89/40 (56) 95   


 


9/6/20 23:30  81 26 93/47 (62) 97   


 


9/6/20 23:15  68 26 103/44 (63) 98   


 


9/6/20 23:00  75 24 99/51 (67) 98   


 


9/6/20 23:00    93/47    


 


9/6/20 22:45  72 26 102/44 (63) 98   


 


9/6/20 22:30  67 26 98/45 (62) 98   


 


9/6/20 22:00  64 26 111/59 (76) 100   


 


9/6/20 21:52  60 26     100


 


9/6/20 21:45  61 26 108/47 (67) 99   


 


9/6/20 21:30  65 26 111/54 (73) 100   


 


9/6/20 21:15  62 26 102/48 (66) 99   


 


9/6/20 21:00  64 26 105/47 (66) 100   


 


9/6/20 21:00    111/59    


 


9/6/20 20:30  63 26 104/52 (69) 100   


 


9/6/20 20:00        100


 


9/6/20 20:00  64      


 


9/6/20 20:00    108/56    


 


9/6/20 20:00    105/47    


 


9/6/20 20:00 99.0 61 26 108/46 (66) 100   


 


9/6/20 20:00      Mechanical Ventilator  





      Mechanical Ventilator  


 


9/6/20 19:56  63 26     100


 


9/6/20 19:47    102/51    


 


9/6/20 19:30  70 24 96/47 (63) 100   


 


9/6/20 19:00    102/51    


 


9/6/20 19:00  62 26 102/51 (68) 100   


 


9/6/20 18:30  68 26 114/42 (66) 100   


 


9/6/20 18:00  72 25 96/44 (61) 100   


 


9/6/20 18:00    96/44    


 


9/6/20 17:30  68 25 100/61 (74) 100   


 


9/6/20 17:00    94/43    


 


9/6/20 17:00  65 26 94/43 (60) 100   


 


9/6/20 16:30  68 26     100


 


9/6/20 16:30  62 26 100/45 (63) 100   


 


9/6/20 16:00        100


 


9/6/20 16:00  62      


 


9/6/20 16:00 98.3 68 26 98/44 (62) 97   


 


9/6/20 16:00      Mechanical Ventilator  





      Mechanical Ventilator  


 


9/6/20 16:00    98/44    


 


9/6/20 15:30  62 26 99/44 (62) 98   


 


9/6/20 15:00    104/43    


 


9/6/20 15:00  63 26 107/92 (97) 100   


 


9/6/20 14:51  61 26     100


 


9/6/20 14:30  78 26 92/40 (57) 98   


 


9/6/20 14:00  71 26 96/41 (59) 99   


 


9/6/20 14:00    96/41    


 


9/6/20 13:30  72 27 91/44 (60) 100   


 


9/6/20 13:00    98/45    


 


9/6/20 13:00  68 24 98/45 (62) 100   


 


9/6/20 12:43  67 26     100


 


9/6/20 12:30  61 26 99/44 (62) 100   


 


9/6/20 12:00      Mechanical Ventilator  





      Mechanical Ventilator  


 


9/6/20 12:00        100


 


9/6/20 12:00    102/43    


 


9/6/20 12:00 98.4 70 25 102/43 (62) 100   


 


9/6/20 12:00  61      


 


9/6/20 11:30  67 26 104/44 (64) 100   








Height (Feet):  5


Height (Inches):  3.00


Weight (Pounds):  172


GEN: NAD on Vent 80%


HEENT: NCAT, Intubated


Pulm: Equal chest rise and fall B/L, No accessory muscle use


ABD: Soft, ND


SKIN: Exposed skin with no rash, Normal in color





Laboratory Tests








Test


  9/7/20


04:45 9/7/20


10:09


 


White Blood Count


  18.8 K/UL


(4.8-10.8)  H 


 


 


Red Blood Count


  2.09 M/UL


(4.20-5.40)  L 


 


 


Hemoglobin


  7.0 G/DL


(12.0-16.0)  L 


 


 


Hematocrit


  20.9 %


(37.0-47.0)  L 


 


 


Mean Corpuscular Volume


  100 FL (80-99)


H 


 


 


Mean Corpuscular Hemoglobin


  33.3 PG


(27.0-31.0)  H 


 


 


Mean Corpuscular Hemoglobin


Concent 33.3 G/DL


(32.0-36.0) 


 


 


Red Cell Distribution Width


  14.9 %


(11.6-14.8)  H 


 


 


Platelet Count


  71 K/UL


(150-450)  L 


 


 


Mean Platelet Volume


  9.1 FL


(6.5-10.1) 


 


 


Neutrophils (%) (Auto)


  % (45.0-75.0)


  


 


 


Lymphocytes (%) (Auto)


  % (20.0-45.0)


  


 


 


Monocytes (%) (Auto)  % (1.0-10.0)   


 


Eosinophils (%) (Auto)  % (0.0-3.0)   


 


Basophils (%) (Auto)  % (0.0-2.0)   


 


Differential Total Cells


Counted 100  


  


 


 


Neutrophils % (Manual) 86 % (45-75)  H 


 


Lymphocytes % (Manual) 5 % (20-45)  L 


 


Monocytes % (Manual) 9 % (1-10)   


 


Eosinophils % (Manual) 0 % (0-3)   


 


Basophils % (Manual) 0 % (0-2)   


 


Band Neutrophils 0 % (0-8)   


 


Platelet Estimate Decreased  L 


 


Platelet Morphology Normal   


 


Hypochromasia 3+   


 


Anisocytosis 1+   


 


Spherocytes 2+   


 


Sodium Level


  148 MMOL/L


(136-145)  H 


 


 


Potassium Level


  3.6 MMOL/L


(3.5-5.1) 


 


 


Chloride Level


  119 MMOL/L


()  H 


 


 


Carbon Dioxide Level


  24 MMOL/L


(21-32) 


 


 


Anion Gap


  5 mmol/L


(5-15) 


 


 


Blood Urea Nitrogen


  47 mg/dL


(7-18)  H 


 


 


Creatinine


  1.2 MG/DL


(0.55-1.30) 


 


 


Estimat Glomerular Filtration


Rate 46.2 mL/min


(>60) 


 


 


Glucose Level


  129 MG/DL


()  H 


 


 


Calcium Level


  8.2 MG/DL


(8.5-10.1)  L 


 


 


Iron Level  Pending  


 


Unsaturated Iron Binding  Pending  


 


Ferritin  Pending  


 


Vitamin B12 Level  Pending  


 


Folate  Pending  











Current Medications








 Medications


  (Trade)  Dose


 Ordered  Sig/Didi


 Route


 PRN Reason  Start Time


 Stop Time Status Last Admin


Dose Admin


 


 Acetaminophen


  (Tylenol)  650 mg  Q4H  PRN


 GT


 FEVER  8/26/20 11:45


 9/25/20 11:44  9/7/20 03:17


 


 


 Chlorhexidine


 Gluconate


  (Beatrice-Hex 2%)  1 applic  DAILY@2000


 TOPIC


   8/31/20 20:00


 11/29/20 19:59  9/6/20 19:45


 


 


 Clotrimazole


  (Lotrimin)  1 applic  Q12HR


 TOPIC


   8/30/20 13:00


 11/28/20 12:59  9/7/20 09:02


 


 


 Dextrose


  (Dextrose 50%)  25 ml  Q30M  PRN


 IV


 Hypoglycemia  8/26/20 11:30


 11/20/20 11:29   


 


 


 Dextrose


  (Dextrose 50%)  50 ml  Q30M  PRN


 IV


 Hypoglycemia  8/26/20 11:30


 11/20/20 11:29   


 


 


 Dopamine HCl/


 Dextrose  250 ml @ 0


 mls/hr  Q24H


 IV


   9/4/20 11:15


 12/3/20 11:14  9/6/20 19:47


 


 


 Hydrocortisone


  (Solu-CORTEF)  100 mg  EVERY 8  HOURS


 IV


   8/30/20 14:00


 11/28/20 13:59  9/7/20 04:48


 


 


 Levothyroxine


 Sodium


  (Synthroid)  75 mcg  DAILY


 GT


   8/27/20 09:00


 9/22/20 08:59  9/7/20 08:51


 


 


 Lorazepam


  (Ativan 2mg/ml


 1ml)  2 mg  Q2H  PRN


 IV


 For Anxiety  9/4/20 17:30


 9/11/20 17:29  9/4/20 17:37


 


 


 Meropenem 1 gm/


 Sodium Chloride  55 ml @ 


 110 mls/hr  Q8H


 IVPB


   9/6/20 12:00


 9/11/20 11:59  9/7/20 04:26


 


 


 Midodrine


  (Pro-Amatine)  10 mg  Q8HR


 GT


   8/28/20 14:00


 11/26/20 13:59  9/7/20 04:48


 


 


 Norepinephrine


 Bitartrate 16 mg/


 Dextrose  500 ml @ 0


 mls/hr  Q24H


 IV


   8/31/20 07:45


 9/29/20 12:59  9/4/20 08:43


 


 


 Ondansetron HCl


  (Zofran)  4 mg  Q6H  PRN


 IVP


 Nausea & Vomiting  8/26/20 12:00


 9/21/20 11:59   


 


 


 Pantoprazole


  (Protonix)  40 mg  DAILY


 IV


   8/27/20 09:00


 9/22/20 08:59  9/7/20 08:51


 


 


 Phenylephrine HCl


 50 mg/Dextrose  250 ml @ 0


 mls/hr  Q24H  PRN


 IV


 For hypotension  8/29/20 17:45


 9/28/20 17:44  8/31/20 01:49


 


 


 Polyethylene


 Glycol


  (Miralax)  17 gm  DAILYPRN  PRN


 GT


 Constipation  8/26/20 12:00


 9/21/20 11:59   


 


 


 Valproic Acid


  (Depakene)  500 mg  EVERY 8  HOURS


 GT


   8/26/20 14:00


 9/21/20 21:59  9/7/20 04:48


 

















Elfego Mcmahon MD Sep 7, 2020 11:17

## 2020-09-07 NOTE — NUR
NURSE NOTES:

Dr Cherry assessing pt at bedside - 1 unit of PRBCs ordered. Will draw ABO/RH and send down 
to lab.

## 2020-09-07 NOTE — NUR
NURSE NOTES:

Received call back from Dr Cherry with order to increase peep to 12. vent settings changed 
per RT.

## 2020-09-07 NOTE — NUR
NURSE NOTES:



No blood transfusion reaction noted after 15 mins. Will continue blood transfusion per 
protocol.

## 2020-09-08 VITALS — DIASTOLIC BLOOD PRESSURE: 47 MMHG | SYSTOLIC BLOOD PRESSURE: 111 MMHG

## 2020-09-08 VITALS — DIASTOLIC BLOOD PRESSURE: 50 MMHG | SYSTOLIC BLOOD PRESSURE: 109 MMHG

## 2020-09-08 VITALS — DIASTOLIC BLOOD PRESSURE: 99 MMHG | SYSTOLIC BLOOD PRESSURE: 138 MMHG

## 2020-09-08 VITALS — DIASTOLIC BLOOD PRESSURE: 71 MMHG | SYSTOLIC BLOOD PRESSURE: 93 MMHG

## 2020-09-08 VITALS — SYSTOLIC BLOOD PRESSURE: 96 MMHG | DIASTOLIC BLOOD PRESSURE: 49 MMHG

## 2020-09-08 VITALS — SYSTOLIC BLOOD PRESSURE: 99 MMHG | DIASTOLIC BLOOD PRESSURE: 45 MMHG

## 2020-09-08 VITALS — DIASTOLIC BLOOD PRESSURE: 61 MMHG | SYSTOLIC BLOOD PRESSURE: 110 MMHG

## 2020-09-08 VITALS — DIASTOLIC BLOOD PRESSURE: 76 MMHG | SYSTOLIC BLOOD PRESSURE: 106 MMHG

## 2020-09-08 VITALS — DIASTOLIC BLOOD PRESSURE: 52 MMHG | SYSTOLIC BLOOD PRESSURE: 117 MMHG

## 2020-09-08 VITALS — DIASTOLIC BLOOD PRESSURE: 45 MMHG | SYSTOLIC BLOOD PRESSURE: 99 MMHG

## 2020-09-08 VITALS — SYSTOLIC BLOOD PRESSURE: 92 MMHG | DIASTOLIC BLOOD PRESSURE: 65 MMHG

## 2020-09-08 VITALS — DIASTOLIC BLOOD PRESSURE: 52 MMHG | SYSTOLIC BLOOD PRESSURE: 104 MMHG

## 2020-09-08 VITALS — SYSTOLIC BLOOD PRESSURE: 108 MMHG | DIASTOLIC BLOOD PRESSURE: 52 MMHG

## 2020-09-08 VITALS — DIASTOLIC BLOOD PRESSURE: 61 MMHG | SYSTOLIC BLOOD PRESSURE: 119 MMHG

## 2020-09-08 VITALS — DIASTOLIC BLOOD PRESSURE: 50 MMHG | SYSTOLIC BLOOD PRESSURE: 106 MMHG

## 2020-09-08 VITALS — DIASTOLIC BLOOD PRESSURE: 56 MMHG | SYSTOLIC BLOOD PRESSURE: 113 MMHG

## 2020-09-08 VITALS — SYSTOLIC BLOOD PRESSURE: 95 MMHG | DIASTOLIC BLOOD PRESSURE: 50 MMHG

## 2020-09-08 VITALS — DIASTOLIC BLOOD PRESSURE: 52 MMHG | SYSTOLIC BLOOD PRESSURE: 109 MMHG

## 2020-09-08 VITALS — SYSTOLIC BLOOD PRESSURE: 121 MMHG | DIASTOLIC BLOOD PRESSURE: 68 MMHG

## 2020-09-08 VITALS — SYSTOLIC BLOOD PRESSURE: 117 MMHG | DIASTOLIC BLOOD PRESSURE: 54 MMHG

## 2020-09-08 VITALS — DIASTOLIC BLOOD PRESSURE: 80 MMHG | SYSTOLIC BLOOD PRESSURE: 94 MMHG

## 2020-09-08 VITALS — SYSTOLIC BLOOD PRESSURE: 94 MMHG | DIASTOLIC BLOOD PRESSURE: 70 MMHG

## 2020-09-08 VITALS — SYSTOLIC BLOOD PRESSURE: 105 MMHG | DIASTOLIC BLOOD PRESSURE: 57 MMHG

## 2020-09-08 VITALS — DIASTOLIC BLOOD PRESSURE: 52 MMHG | SYSTOLIC BLOOD PRESSURE: 110 MMHG

## 2020-09-08 VITALS — SYSTOLIC BLOOD PRESSURE: 109 MMHG | DIASTOLIC BLOOD PRESSURE: 52 MMHG

## 2020-09-08 VITALS — DIASTOLIC BLOOD PRESSURE: 59 MMHG | SYSTOLIC BLOOD PRESSURE: 120 MMHG

## 2020-09-08 VITALS — DIASTOLIC BLOOD PRESSURE: 56 MMHG | SYSTOLIC BLOOD PRESSURE: 114 MMHG

## 2020-09-08 VITALS — DIASTOLIC BLOOD PRESSURE: 55 MMHG | SYSTOLIC BLOOD PRESSURE: 116 MMHG

## 2020-09-08 VITALS — DIASTOLIC BLOOD PRESSURE: 52 MMHG | SYSTOLIC BLOOD PRESSURE: 99 MMHG

## 2020-09-08 VITALS — DIASTOLIC BLOOD PRESSURE: 68 MMHG | SYSTOLIC BLOOD PRESSURE: 134 MMHG

## 2020-09-08 VITALS — DIASTOLIC BLOOD PRESSURE: 59 MMHG | SYSTOLIC BLOOD PRESSURE: 121 MMHG

## 2020-09-08 LAB
ADD MANUAL DIFF: YES
ALBUMIN SERPL-MCNC: 1.2 G/DL (ref 3.4–5)
ALBUMIN/GLOB SERPL: 0.4 {RATIO} (ref 1–2.7)
ALP SERPL-CCNC: 60 U/L (ref 46–116)
ALT SERPL-CCNC: 221 U/L (ref 12–78)
ANION GAP SERPL CALC-SCNC: 7 MMOL/L (ref 5–15)
AST SERPL-CCNC: 81 U/L (ref 15–37)
BILIRUB SERPL-MCNC: 0.4 MG/DL (ref 0.2–1)
BUN SERPL-MCNC: 46 MG/DL (ref 7–18)
CALCIUM SERPL-MCNC: 7.9 MG/DL (ref 8.5–10.1)
CHLORIDE SERPL-SCNC: 120 MMOL/L (ref 98–107)
CO2 SERPL-SCNC: 25 MMOL/L (ref 21–32)
CREAT SERPL-MCNC: 1.2 MG/DL (ref 0.55–1.3)
ERYTHROCYTE [DISTWIDTH] IN BLOOD BY AUTOMATED COUNT: 15.5 % (ref 11.6–14.8)
GAMMA GLUTAMYL TRANSPEPTIDASE: 45 U/L (ref 5–85)
GLOBULIN SER-MCNC: 3.2 G/DL
HCT VFR BLD CALC: 25.4 % (ref 37–47)
HGB BLD-MCNC: 8.6 G/DL (ref 12–16)
MCV RBC AUTO: 96 FL (ref 80–99)
PHOSPHATE SERPL-MCNC: 2.8 MG/DL (ref 2.5–4.9)
PLATELET # BLD: 100 K/UL (ref 150–450)
POTASSIUM SERPL-SCNC: 3.5 MMOL/L (ref 3.5–5.1)
RBC # BLD AUTO: 2.65 M/UL (ref 4.2–5.4)
SODIUM SERPL-SCNC: 152 MMOL/L (ref 136–145)
WBC # BLD AUTO: 22 K/UL (ref 4.8–10.8)

## 2020-09-08 RX ADMIN — SODIUM CHLORIDE SCH MLS/HR: 900 INJECTION, SOLUTION INTRAVENOUS at 07:45

## 2020-09-08 RX ADMIN — PANTOPRAZOLE SODIUM SCH MG: 40 INJECTION, POWDER, FOR SOLUTION INTRAVENOUS at 08:17

## 2020-09-08 RX ADMIN — MIDODRINE HYDROCHLORIDE SCH MG: 10 TABLET ORAL at 06:07

## 2020-09-08 RX ADMIN — MEROPENEM SCH MLS/HR: 1 INJECTION INTRAVENOUS at 12:20

## 2020-09-08 RX ADMIN — HYDROCORTISONE SODIUM SUCCINATE SCH MG: 100 INJECTION, POWDER, FOR SOLUTION INTRAMUSCULAR; INTRAVENOUS at 21:47

## 2020-09-08 RX ADMIN — MEROPENEM SCH MLS/HR: 1 INJECTION INTRAVENOUS at 20:13

## 2020-09-08 RX ADMIN — VALPROIC ACID SCH MG: 250 SOLUTION ORAL at 21:47

## 2020-09-08 RX ADMIN — MIDODRINE HYDROCHLORIDE SCH MG: 10 TABLET ORAL at 21:47

## 2020-09-08 RX ADMIN — DOPAMINE HYDROCHLORIDE IN DEXTROSE SCH MLS/HR: 1.6 INJECTION, SOLUTION INTRAVENOUS at 11:15

## 2020-09-08 RX ADMIN — MEROPENEM SCH MLS/HR: 1 INJECTION INTRAVENOUS at 04:08

## 2020-09-08 RX ADMIN — HYDROCORTISONE SODIUM SUCCINATE SCH MG: 100 INJECTION, POWDER, FOR SOLUTION INTRAMUSCULAR; INTRAVENOUS at 06:07

## 2020-09-08 RX ADMIN — AMIKACIN SULFATE SCH MLS/HR: 500 INJECTION, SOLUTION INTRAMUSCULAR; INTRAVENOUS at 13:56

## 2020-09-08 RX ADMIN — MIDODRINE HYDROCHLORIDE SCH MG: 10 TABLET ORAL at 13:37

## 2020-09-08 RX ADMIN — HYDROCORTISONE SODIUM SUCCINATE SCH MG: 100 INJECTION, POWDER, FOR SOLUTION INTRAMUSCULAR; INTRAVENOUS at 13:38

## 2020-09-08 RX ADMIN — CHLORHEXIDINE GLUCONATE SCH APPLIC: 213 SOLUTION TOPICAL at 20:03

## 2020-09-08 RX ADMIN — VALPROIC ACID SCH MG: 250 SOLUTION ORAL at 06:07

## 2020-09-08 RX ADMIN — VALPROIC ACID SCH MG: 250 SOLUTION ORAL at 13:37

## 2020-09-08 NOTE — CONSULTATION
DATE OF CONSULTATION:  09/08/2020

CHIEF COMPLAINT:  Anemia, GI bleeding, dysphagia.



HISTORY OF PRESENT ILLNESS:  Most of history per chart.  The patient is

intubated in ICU.



PAST MEDICAL HISTORY:

1. History of Down syndrome.

2. Dysphagia with G-tube.

3. Seizure disorder.

4. Hypothyroidism.

5. DAVID.

6. Pneumonia.

7. Sepsis.



ALLERGIES:  No known allergies.



MEDICATIONS:  Please see medication reconciliation list.



SOCIAL HISTORY:  There is no history of tobacco, alcohol, or drug abuse.



FAMILY HISTORY:  Noncontributory.



REVIEW OF SYSTEMS:  Limited.



PHYSICAL EXAMINATION:

VITAL SIGNS:  Temperature is 99.4, pulse 68, respirations 26, blood

pressure is 113/56.

HEENT:  Normocephalic and atraumatic.  Pale conjunctivae.

NECK:  Supple.

CARDIOVASCULAR:  Tachy.  Regular rate. Plus S1, S2.

LUNGS:  Decreased breath sounds bilaterally.  The patient has crackles on

both lung bases.

ABDOMEN:  Soft and nontender.   G-tube in place.  No rebound.  No guarding.

No peritoneal sign.

EXTREMITIES:  Bilateral lower extremity edema.  The patient has anasarca.



LABORATORY DATA:  White count is 22,000, hemoglobin 8.6, hematocrit 25,

platelets of 100.  Chem-7, sodium is 152, potassium 3.5, BUN is 46,

creatinine is 1.2.



ASSESSMENT AND PLAN:  This is a 59-year-old female with numerous medical

problems.  From GI standpoint, has a dysphagia with a G-tube, anemia.



PLAN:  In terms of anemia, the patient has no overt evidence of GI

bleeding, although the stool OB x1 has been positive.  The patient had a

unit of blood transfusion with a good response.  The patient is very sick

and not very stable to have any GI procedures.  At this time, she is _____

on vent setting.  Plan to monitor hemoglobin and hematocrit.  Transfuse

as needed.  Send the repeat stool for OB.  Continue on PPI.



In terms G-tube feeding, the patient is tolerating feeding without any

residuals.  We will continue.



I want to thank, Dr. Tyson Hung, for this kind referral.









  ______________________________________________

  Tde Nagy M.D.





DR:  Crystal

D:  09/08/2020 12:38

T:  09/08/2020 13:53

JOB#:  [id number]

CC:  Tyson Hung M.D.; Fax#:  [phone]

## 2020-09-08 NOTE — DIAGNOSTIC IMAGING REPORT
Indication: Dyspnea

 

Technique: One view of the chest

 

Comparison: 9/5/2020

 

Findings: Left arm PICC is again demonstrated. Bilateral interstitial and airspace

infiltrates versus edema again demonstrated, unchanged. Left-sided pleural effusion

appears slightly decreased. Small right-sided pleural effusion is probably unchanged.

The heart remains enlarged.

 

Impression: Unchanged bilateral parenchymal disease

 

Evidence of slightly improved left pleural effusion

## 2020-09-08 NOTE — NUR
SI:     RESP FAILURE ETT/VENT SUPPORT,PNA

T. 99.0 HR 61 RR 26 B/P 110/61

AC 26  FIO2 100% PEEP 12

PH 7.47 PCO2 29.3 PO2 56.8 HCO3 21.3 O2 SAT 88.6

WBC 22.0 H/H 8.6/25.4  BUN 46 BNP 6068







IS:     DOPAMINE GTT

MEROPENEM IV

SOLU CORTEF IV

********ICU STATUS******

## 2020-09-08 NOTE — CARDIOLOGY PROGRESS NOTE
Assessment/Plan


Assessment/Plan


sepsis


respiratory failure 


ards 


renal insuf 


bacteremia


abn cardiac enzyme due to demand 


sinus arnold stable 


thrombocytopenia resolved  


hypernatremia 








covid isolation removed as covid -x3


min trop abn 


ekg last one form 8/22 reviewed non ischemic 


echo preformed 8/25 nl lv function 


now off pressor 


vent support 


abx 


ekg personally reviewed 


tele personally reviewed no pauses 


wbc up 


na increased 


renal insuf min better 


3rd spacing alot 


now on peep 12 an 100 % oxygen 


cxr form 9/5 reviewed 


tsh seems fine





Subjective


ROS Limited/Unobtainable:  Yes


Subjective


on  a vent not communicative





Objective





Last 24 Hour Vital Signs








  Date Time  Temp Pulse Resp B/P (MAP) Pulse Ox O2 Delivery O2 Flow Rate FiO2


 


9/8/20 11:26        100


 


9/8/20 11:24  58 26     100


 


9/8/20 11:15    113/56    


 


9/8/20 11:00  60 26 113/56 (75) 92   


 


9/8/20 10:00  61 26 120/59 (79) 91   


 


9/8/20 09:00  63 26 114/56 (75) 90   


 


9/8/20 08:00      Mechanical Ventilator  





      Mechanical Ventilator  


 


9/8/20 08:00 99.4 63 25 121/59 (79) 94   


 


9/8/20 08:00        70


 


9/8/20 07:45    117/54    


 


9/8/20 07:24  59 26     70


 


9/8/20 07:00  73 25 117/54 (75) 93   


 


9/8/20 06:30  69 25 95/50 (65) 93   


 


9/8/20 06:00 99.8 81 20 99/45 (63) 91   


 


9/8/20 05:30  66 26 99/45 (63) 93   


 


9/8/20 05:00  83 23 99/52 (68) 96   


 


9/8/20 05:00  74 27     70


 


9/8/20 04:30  63 26 111/47 (68) 96   


 


9/8/20 04:00      Mechanical Ventilator  





      Mechanical Ventilator  


 


9/8/20 04:00 100.4 62 26 109/50 (69) 96   


 


9/8/20 04:00        80


 


9/8/20 04:00  92      


 


9/8/20 03:30  61 26 94/70 (78) 97   


 


9/8/20 03:00  67 26 110/52 (71) 98   


 


9/8/20 02:46  79 28     80


 


9/8/20 02:30  79 25 93/71 (78) 98   


 


9/8/20 02:00    110/50    


 


9/8/20 02:00  71 25 94/80 (85) 96   


 


9/8/20 01:30  65 25 116/55 (75) 97   


 


9/8/20 01:00  65 26 117/52 (73) 98   


 


9/8/20 01:00    116/55    


 


9/8/20 00:30  68 26 121/68 (85) 98   


 


9/8/20 00:00 98.8 67 26 134/68 (90) 97   


 


9/8/20 00:00      Mechanical Ventilator  





      Mechanical Ventilator  


 


9/8/20 00:00    121/68    


 


9/8/20 00:00  72      


 


9/8/20 00:00        80


 


9/7/20 23:30  66 26 121/61 (81) 97   


 


9/7/20 23:00  70 26 115/69 (84) 98   


 


9/7/20 23:00    115/69    


 


9/7/20 22:56  76 30     80


 


9/7/20 22:30  69 25 122/59 (80) 99   


 


9/7/20 22:00    104/54    


 


9/7/20 22:00  67 26 104/54 (71) 100   


 


9/7/20 21:30  72 25 104/56 (72) 100   


 


9/7/20 21:00  68 24 128/65 (86) 100   


 


9/7/20 21:00    128/65    


 


9/7/20 20:30  68 26 100/51 (67) 100   


 


9/7/20 20:00 98.6 72 24 113/56 (75) 100   


 


9/7/20 20:00    113/56    


 


9/7/20 20:00        80


 


9/7/20 20:00      Mechanical Ventilator  





      Mechanical Ventilator  


 


9/7/20 20:00  69      


 


9/7/20 19:30  73 26 98/48 (65) 100   


 


9/7/20 19:00    101/50    


 


9/7/20 19:00  73 25 101/50 (67) 100   


 


9/7/20 18:53  75 27     90


 


9/7/20 18:45  69 24 97/51 (66) 100   


 


9/7/20 18:30 99.1 67 26 99/51 (67) 100   


 


9/7/20 18:15 99.4 76 25 99/53 (68) 100   


 


9/7/20 18:00    97/50    


 


9/7/20 18:00  70 25 97/50 (66) 99   


 


9/7/20 17:30  69 26 96/50 (65) 99   


 


9/7/20 17:00    87/74    


 


9/7/20 17:00  74 25 87/74 (78) 99   


 


9/7/20 16:54  67 26     90


 


9/7/20 16:30  68 26 98/50 (66) 99   


 


9/7/20 16:06  78      


 


9/7/20 16:00 99.2 73 26 98/47 (64) 99   


 


9/7/20 16:00      Mechanical Ventilator  





      Mechanical Ventilator  


 


9/7/20 16:00    98/47    


 


9/7/20 16:00        90


 


9/7/20 15:30  74 23 91/46 (61) 99   


 


9/7/20 15:16        90


 


9/7/20 15:14  74 26     90


 


9/7/20 15:00    107/61    


 


9/7/20 15:00  90 27 107/61 (76) 92   





  90      


 


9/7/20 14:30  79 28 95/51 (66) 92   


 


9/7/20 14:00  78 28 92/53 (66) 91   


 


9/7/20 14:00    92/53    


 


9/7/20 13:45        90


 


9/7/20 13:30  77 26 93/77 (82) 93   


 


9/7/20 13:20  75 26     80


 


9/7/20 13:00    98/50    


 


9/7/20 13:00  70 26 98/50 (66) 93   








General Appearance:  no apparent distress, alert, on vent, patient on isolation


Neck:  supple


Cardiovascular:  normal rate


Respiratory/Chest:  lungs clear - ant


Abdomen:  normal bowel sounds, non tender, soft


Extremities:  no swelling











Intake and Output  


 


 9/7/20 9/8/20





 19:00 07:00


 


Intake Total 668.156 ml 1144.068 ml


 


Output Total 1355 ml 870 ml


 


Balance -686.844 ml 274.068 ml


 


  


 


Free Water 10 ml 110 ml


 


IV Total 158.156 ml 134.068 ml


 


Tube Feeding 500 ml 600 ml


 


Blood Product  300 ml


 


Output Urine Total 1355 ml 870 ml


 


# Bowel Movements 1 3











Laboratory Tests








Test


  9/7/20


13:38 9/8/20


04:00 9/8/20


08:48


 


Arterial Blood pH


  7.466


(7.350-7.450) 


  7.479


(7.350-7.450)


 


Arterial Blood Partial


Pressure CO2 28.0 mmHg


(35.0-45.0)  L 


  29.3 mmHg


(35.0-45.0)  L


 


Arterial Blood Partial


Pressure O2 59.6 mmHg


(75.0-100.0)  L 


  56.8 mmHg


(75.0-100.0)  L


 


Arterial Blood HCO3


  19.7 mmol/L


(22.0-26.0)  L 


  21.3 mmol/L


(22.0-26.0)  L


 


Arterial Blood Oxygen


Saturation 91.1 %


()  L 


  88.6 %


()  *L


 


Arterial Blood Base Excess -3.4 (-2-2)  L  -1.7 (-2-2)  


 


Cole Test Positive    Positive  


 


White Blood Count


  


  22.0 K/UL


(4.8-10.8)  H 


 


 


Red Blood Count


  


  2.65 M/UL


(4.20-5.40)  L 


 


 


Hemoglobin


  


  8.6 G/DL


(12.0-16.0)  L 


 


 


Hematocrit


  


  25.4 %


(37.0-47.0)  L 


 


 


Mean Corpuscular Volume  96 FL (80-99)   


 


Mean Corpuscular Hemoglobin


  


  32.6 PG


(27.0-31.0)  H 


 


 


Mean Corpuscular Hemoglobin


Concent 


  34.1 G/DL


(32.0-36.0) 


 


 


Red Cell Distribution Width


  


  15.5 %


(11.6-14.8)  H 


 


 


Platelet Count


  


  100 K/UL


(150-450)  L 


 


 


Mean Platelet Volume


  


  10.7 FL


(6.5-10.1)  H 


 


 


Neutrophils (%) (Auto)


  


  % (45.0-75.0)


  


 


 


Lymphocytes (%) (Auto)


  


  % (20.0-45.0)


  


 


 


Monocytes (%) (Auto)   % (1.0-10.0)   


 


Eosinophils (%) (Auto)   % (0.0-3.0)   


 


Basophils (%) (Auto)   % (0.0-2.0)   


 


Differential Total Cells


Counted 


  100  


  


 


 


Neutrophils % (Manual)  89 % (45-75)  H 


 


Lymphocytes % (Manual)  5 % (20-45)  L 


 


Monocytes % (Manual)  6 % (1-10)   


 


Eosinophils % (Manual)  0 % (0-3)   


 


Basophils % (Manual)  0 % (0-2)   


 


Band Neutrophils  0 % (0-8)   


 


Platelet Estimate  Decreased  L 


 


Platelet Morphology     


 


Giant Platelets  Rare   


 


Polychromasia  1+   


 


Hypochromasia  1+   


 


Anisocytosis  1+   


 


Sodium Level


  


  152 MMOL/L


(136-145)  H 


 


 


Potassium Level


  


  3.5 MMOL/L


(3.5-5.1) 


 


 


Chloride Level


  


  120 MMOL/L


()  H 


 


 


Carbon Dioxide Level


  


  25 MMOL/L


(21-32) 


 


 


Anion Gap


  


  7 mmol/L


(5-15) 


 


 


Blood Urea Nitrogen


  


  46 mg/dL


(7-18)  H 


 


 


Creatinine


  


  1.2 MG/DL


(0.55-1.30) 


 


 


Estimat Glomerular Filtration


Rate 


  46.2 mL/min


(>60) 


 


 


Glucose Level


  


  134 MG/DL


()  H 


 


 


Calcium Level


  


  7.9 MG/DL


(8.5-10.1)  L 


 


 


Phosphorus Level


  


  2.8 MG/DL


(2.5-4.9) 


 


 


Magnesium Level


  


  2.1 MG/DL


(1.8-2.4) 


 


 


Total Bilirubin


  


  0.4 MG/DL


(0.2-1.0) 


 


 


Gamma Glutamyl Transpeptidase  45 U/L (5-85)   


 


Aspartate Amino Transf


(AST/SGOT) 


  81 U/L (15-37)


H 


 


 


Alanine Aminotransferase


(ALT/SGPT) 


  221 U/L


(12-78)  H 


 


 


Alkaline Phosphatase


  


  60 U/L


() 


 


 


C-Reactive Protein,


Quantitative 


  0.6 mg/dL


(0.00-0.90) 


 


 


Pro-B-Type Natriuretic Peptide


  


  6066 pg/mL


(0-125)  H 


 


 


Total Protein


  


  4.4 G/DL


(6.4-8.2)  L 


 


 


Albumin


  


  1.2 G/DL


(3.4-5.0)  L 


 


 


Globulin  3.2 g/dL   


 


Albumin/Globulin Ratio


  


  0.4 (1.0-2.7)


L 


 


 


Thyroid Stimulating Hormone


(TSH) 


  0.894 uiU/mL


(0.358-3.740) 


 











Microbiology








 Date/Time


Source Procedure


Growth Status


 


 


 9/6/20 13:35


Blood Blood Culture - Preliminary


NO GROWTH AFTER 24 HOURS Resulted


 


 9/6/20 13:35


Blood Blood Culture - Preliminary


NO GROWTH AFTER 24 HOURS Resulted


 


 9/7/20 14:00


Indwelling Cath Urine Culture - Preliminary


NO GROWTH Resulted

















Constantine Jorgensen MD Sep 8, 2020 12:47

## 2020-09-08 NOTE — PULMONOLGY CRITICAL CARE NOTE
Critical Care - Asmt/Plan


Problems:  


(1) Acute respiratory failure


(2) Bacteremia


(3) Pneumonia


(4) Sepsis


(5) HCAP (healthcare-associated pneumonia)


(6) Seizure disorder


(7) Down's syndrome


Respiratory:  monitor respiratory rate, adjust FIO2, CXR


Cardiac:  continue pressors, continue to monitor HR/BP


Renal:  F/U I&O, check electrolytes


Infectious Disease:  check cultures, continue antibiotics


Gastrointestinal:  continue feedings/current rate


Endocrine:  monitor blood sugar, check HgA1C


Neurologic:  PRN Ativan, PRN Morphine


Affect:  PRN ativan


Prophylaxis:  Protonix


Notes Reviewed:  internist, cardio, renal


Discussed with:  nurses, consultants, 





Critical Care - Objective





Last 24 Hour Vital Signs








  Date Time  Temp Pulse Resp B/P (MAP) Pulse Ox O2 Delivery O2 Flow Rate FiO2


 


9/8/20 09:00  63 26 114/56 (75) 90   


 


9/8/20 08:00      Mechanical Ventilator  





      Mechanical Ventilator  


 


9/8/20 08:00 99.4 63 25 121/59 (79) 94   


 


9/8/20 08:00        70


 


9/8/20 07:45    117/54    


 


9/8/20 07:24  59 26     70


 


9/8/20 07:00  73 25 117/54 (75) 93   


 


9/8/20 06:30  69 25 95/50 (65) 93   


 


9/8/20 06:00 99.8 81 20 99/45 (63) 91   


 


9/8/20 05:30  66 26 99/45 (63) 93   


 


9/8/20 05:00  83 23 99/52 (68) 96   


 


9/8/20 05:00  74 27     70


 


9/8/20 04:30  63 26 111/47 (68) 96   


 


9/8/20 04:00      Mechanical Ventilator  





      Mechanical Ventilator  


 


9/8/20 04:00 100.4 62 26 109/50 (69) 96   


 


9/8/20 04:00        80


 


9/8/20 04:00  92      


 


9/8/20 03:30  61 26 94/70 (78) 97   


 


9/8/20 03:00  67 26 110/52 (71) 98   


 


9/8/20 02:46  79 28     80


 


9/8/20 02:30  79 25 93/71 (78) 98   


 


9/8/20 02:00    110/50    


 


9/8/20 02:00  71 25 94/80 (85) 96   


 


9/8/20 01:30  65 25 116/55 (75) 97   


 


9/8/20 01:00  65 26 117/52 (73) 98   


 


9/8/20 01:00    116/55    


 


9/8/20 00:30  68 26 121/68 (85) 98   


 


9/8/20 00:00 98.8 67 26 134/68 (90) 97   


 


9/8/20 00:00      Mechanical Ventilator  





      Mechanical Ventilator  


 


9/8/20 00:00    121/68    


 


9/8/20 00:00  72      


 


9/8/20 00:00        80


 


9/7/20 23:30  66 26 121/61 (81) 97   


 


9/7/20 23:00  70 26 115/69 (84) 98   


 


9/7/20 23:00    115/69    


 


9/7/20 22:56  76 30     80


 


9/7/20 22:30  69 25 122/59 (80) 99   


 


9/7/20 22:00    104/54    


 


9/7/20 22:00  67 26 104/54 (71) 100   


 


9/7/20 21:30  72 25 104/56 (72) 100   


 


9/7/20 21:00  68 24 128/65 (86) 100   


 


9/7/20 21:00    128/65    


 


9/7/20 20:30  68 26 100/51 (67) 100   


 


9/7/20 20:00 98.6 72 24 113/56 (75) 100   


 


9/7/20 20:00    113/56    


 


9/7/20 20:00        80


 


9/7/20 20:00      Mechanical Ventilator  





      Mechanical Ventilator  


 


9/7/20 20:00  69      


 


9/7/20 19:30  73 26 98/48 (65) 100   


 


9/7/20 19:00    101/50    


 


9/7/20 19:00  73 25 101/50 (67) 100   


 


9/7/20 18:53  75 27     90


 


9/7/20 18:45  69 24 97/51 (66) 100   


 


9/7/20 18:30 99.1 67 26 99/51 (67) 100   


 


9/7/20 18:15 99.4 76 25 99/53 (68) 100   


 


9/7/20 18:00    97/50    


 


9/7/20 18:00  70 25 97/50 (66) 99   


 


9/7/20 17:30  69 26 96/50 (65) 99   


 


9/7/20 17:00    87/74    


 


9/7/20 17:00  74 25 87/74 (78) 99   


 


9/7/20 16:54  67 26     90


 


9/7/20 16:30  68 26 98/50 (66) 99   


 


9/7/20 16:06  78      


 


9/7/20 16:00 99.2 73 26 98/47 (64) 99   


 


9/7/20 16:00      Mechanical Ventilator  





      Mechanical Ventilator  


 


9/7/20 16:00    98/47    


 


9/7/20 16:00        90


 


9/7/20 15:30  74 23 91/46 (61) 99   


 


9/7/20 15:16        90


 


9/7/20 15:14  74 26     90


 


9/7/20 15:00    107/61    


 


9/7/20 15:00  90 27 107/61 (76) 92   





  90      


 


9/7/20 14:30  79 28 95/51 (66) 92   


 


9/7/20 14:00  78 28 92/53 (66) 91   


 


9/7/20 14:00    92/53    


 


9/7/20 13:45        90


 


9/7/20 13:30  77 26 93/77 (82) 93   


 


9/7/20 13:20  75 26     80


 


9/7/20 13:00    98/50    


 


9/7/20 13:00  70 26 98/50 (66) 93   


 


9/7/20 12:30  71 26 96/50 (65) 95   


 


9/7/20 12:00      Mechanical Ventilator  





      Mechanical Ventilator  


 


9/7/20 12:00 99.6 65 26 95/50 (65) 94   


 


9/7/20 12:00  66      


 


9/7/20 12:00        80


 


9/7/20 12:00    95/50    


 


9/7/20 11:30  67 26 98/50 (66) 94   


 


9/7/20 11:06  86 30     80


 


9/7/20 11:00    93/48    


 


9/7/20 11:00  66 26 93/48 (63) 93   


 


9/7/20 10:30  68 26 99/53 (68) 92   








Status:  awake


Condition:  critical


HEENT:  atraumatic, normocephalic


Lungs:  chest wall tender


Heart:  HR/BP stable


Abdomen:  soft


Extremities:  no C/C/E


Micro:





Microbiology








 Date/Time


Source Procedure


Growth Status


 


 


 9/6/20 13:35


Blood Blood Culture - Preliminary


NO GROWTH AFTER 24 HOURS Resulted


 


 9/6/20 13:35


Blood Blood Culture - Preliminary


NO GROWTH AFTER 24 HOURS Resulted


 


 9/7/20 14:00


Indwelling Cath Urine Culture - Preliminary


NO GROWTH Resulted








Accucheck:  131





Critical Care - Subjective


ROS Limited/Unobtainable:  Yes


Condition:  critical


FI02:  70


Vent Support Breath Rate:  26


Vent Support Mode:  AC


Vent Tidal Volume:  500


Sputum Amount:  Small


PEEP:  12.0


PIP:  41


Tube Feeding Amount:  50


I&O:











Intake and Output  


 


 9/7/20 9/8/20





 19:00 07:00


 


Intake Total 668.156 ml 1144.068 ml


 


Output Total 1355 ml 870 ml


 


Balance -686.844 ml 274.068 ml


 


  


 


Free Water 10 ml 110 ml


 


IV Total 158.156 ml 134.068 ml


 


Tube Feeding 500 ml 600 ml


 


Blood Product  300 ml


 


Output Urine Total 1355 ml 870 ml


 


# Bowel Movements 1 3








ET-Tube:  7.5


ET Position:  22


Labs:





Laboratory Tests








Test


  9/7/20


13:38 9/8/20


04:00 9/8/20


08:48


 


Arterial Blood pH


  7.466


(7.350-7.450) 


  7.479


(7.350-7.450)


 


Arterial Blood Partial


Pressure CO2 28.0 mmHg


(35.0-45.0)  L 


  29.3 mmHg


(35.0-45.0)  L


 


Arterial Blood Partial


Pressure O2 59.6 mmHg


(75.0-100.0)  L 


  56.8 mmHg


(75.0-100.0)  L


 


Arterial Blood HCO3


  19.7 mmol/L


(22.0-26.0)  L 


  21.3 mmol/L


(22.0-26.0)  L


 


Arterial Blood Oxygen


Saturation 91.1 %


()  L 


  88.6 %


()  *L


 


Arterial Blood Base Excess -3.4 (-2-2)  L  -1.7 (-2-2)  


 


Cole Test Positive    Positive  


 


White Blood Count


  


  22.0 K/UL


(4.8-10.8)  H 


 


 


Red Blood Count


  


  2.65 M/UL


(4.20-5.40)  L 


 


 


Hemoglobin


  


  8.6 G/DL


(12.0-16.0)  L 


 


 


Hematocrit


  


  25.4 %


(37.0-47.0)  L 


 


 


Mean Corpuscular Volume  96 FL (80-99)   


 


Mean Corpuscular Hemoglobin


  


  32.6 PG


(27.0-31.0)  H 


 


 


Mean Corpuscular Hemoglobin


Concent 


  34.1 G/DL


(32.0-36.0) 


 


 


Red Cell Distribution Width


  


  15.5 %


(11.6-14.8)  H 


 


 


Platelet Count


  


  100 K/UL


(150-450)  L 


 


 


Mean Platelet Volume


  


  10.7 FL


(6.5-10.1)  H 


 


 


Neutrophils (%) (Auto)


  


  % (45.0-75.0)


  


 


 


Lymphocytes (%) (Auto)


  


  % (20.0-45.0)


  


 


 


Monocytes (%) (Auto)   % (1.0-10.0)   


 


Eosinophils (%) (Auto)   % (0.0-3.0)   


 


Basophils (%) (Auto)   % (0.0-2.0)   


 


Differential Total Cells


Counted 


  100  


  


 


 


Neutrophils % (Manual)  89 % (45-75)  H 


 


Lymphocytes % (Manual)  5 % (20-45)  L 


 


Monocytes % (Manual)  6 % (1-10)   


 


Eosinophils % (Manual)  0 % (0-3)   


 


Basophils % (Manual)  0 % (0-2)   


 


Band Neutrophils  0 % (0-8)   


 


Platelet Estimate  Decreased  L 


 


Platelet Morphology     


 


Giant Platelets  Rare   


 


Polychromasia  1+   


 


Hypochromasia  1+   


 


Anisocytosis  1+   


 


Sodium Level


  


  152 MMOL/L


(136-145)  H 


 


 


Potassium Level


  


  3.5 MMOL/L


(3.5-5.1) 


 


 


Chloride Level


  


  120 MMOL/L


()  H 


 


 


Carbon Dioxide Level


  


  25 MMOL/L


(21-32) 


 


 


Anion Gap


  


  7 mmol/L


(5-15) 


 


 


Blood Urea Nitrogen


  


  46 mg/dL


(7-18)  H 


 


 


Creatinine


  


  1.2 MG/DL


(0.55-1.30) 


 


 


Estimat Glomerular Filtration


Rate 


  46.2 mL/min


(>60) 


 


 


Glucose Level


  


  134 MG/DL


()  H 


 


 


Calcium Level


  


  7.9 MG/DL


(8.5-10.1)  L 


 


 


Phosphorus Level


  


  2.8 MG/DL


(2.5-4.9) 


 


 


Magnesium Level


  


  2.1 MG/DL


(1.8-2.4) 


 


 


Total Bilirubin


  


  0.4 MG/DL


(0.2-1.0) 


 


 


Gamma Glutamyl Transpeptidase  45 U/L (5-85)   


 


Aspartate Amino Transf


(AST/SGOT) 


  81 U/L (15-37)


H 


 


 


Alanine Aminotransferase


(ALT/SGPT) 


  221 U/L


(12-78)  H 


 


 


Alkaline Phosphatase


  


  60 U/L


() 


 


 


C-Reactive Protein,


Quantitative 


  0.6 mg/dL


(0.00-0.90) 


 


 


Pro-B-Type Natriuretic Peptide


  


  6066 pg/mL


(0-125)  H 


 


 


Total Protein


  


  4.4 G/DL


(6.4-8.2)  L 


 


 


Albumin


  


  1.2 G/DL


(3.4-5.0)  L 


 


 


Globulin  3.2 g/dL   


 


Albumin/Globulin Ratio


  


  0.4 (1.0-2.7)


L 


 


 


Thyroid Stimulating Hormone


(TSH) 


  0.894 uiU/mL


(0.358-3.740) 


 

















Chano Cherry MD Sep 8, 2020 10:13

## 2020-09-08 NOTE — INFECTIOUS DISEASES PROG NOTE
Assessment/Plan








ASSESSMENT:


sp code blue 8/26





Septic Shock; SP


Fever, recurrent


Leukocytosis; recurrent; worsening


   -9/6 Bcx NTD


            ucx NTD


             sp cx p


  -8/26 u/a no pyuria





Pneumonia.- COVID 19 neg x3


   Acute hypoxic resp failure on VM> NRB 15l 100%; hypoxic on ABG> now VDRF 8/26

- Fio2 80% >100% 8/28> 60% 9/1 >80% 9/2   


       -9/5 CXR:Small bilateral pleural effusions with minor edema.  Stable 

edema with  mild worsening in the degree of pleural effusion on the right.


         -9/1 sp cx Neg


         8/31 CXR: Extensive bilateral interstitial and airspace disease 

appears similar to the prior exam. Moderate to large bilateral pleural 

effusions appear unchanged.


   8/28 CXR: Increasing left upper lobe dense consolidation and likely 

increasing bilateral pleural fluid. Persistent diffuse dense consolidation 

elsewhere


            -8/26 sp cx normal resp lilliam


             -8/25 CXR: Increased atelectasis of the right lung, since prior 

exam of 3 days earlier. New or increased right pleural effusion. Increased left 

basilar consolidation and/or pleural fluid 


          -COVID Rapid PCR neg 8/23, 8/23, 8/26


          -8/22 spc x Group G strep


          -8/22 CXR:   Reduced lung volumes.  Patchy bilateral predominantly 

interstitial  pulmonary opacities.  Could be from edema and/or pneumonia.  

There is a broader differential.


 


        -legionella ag urine, blasto ab, Histo ab, HIV ab screen, JOY, ANCA neg





Persistent, high grade bacteremia-


     -8/22 Bcx 4/4 sets S. haemolyticus; 8/23 Bcx 3/4 S/ epi; 8/27 Bcx 1.4 S. 

warnerri; 8/29 Bcx Neg


     -2d echo: no vegetaions seen





          ua/ wbc 10-15, nit neg, leuk +1; ucx Neg





DAVID; 


 -supratherapeutic vanco levels





-Seizure disorder.


- Hypothyroidism.


- Down syndrome.





History of PEG tube placement.


[facility]





PLAN:





-f/u repeat blood Cx sputum and Urine Cx





Cont Meropenem#13/14 (abx d #17) given resp decompensation


Empiric Amikacin x1 pending repeat cultures





          9/4/20 SP Daptomycin #11


          9/2 SP MIcafungin #7, Linezolid #5


   8/28 SP Azithromycin #7/7


   8/26 SP Ceftriaxone #2


   8/25 SP IV Vancomycin #4, Zosyn #4


   8/22 SP Cefepime x1, Flagyl x1





- Monitor CBC, BMP.


-f/u Cocci ab, CrAg


.f/u  Repeat cx


- COVID neg x3


- Monitor chest x-ray.


- Monitor the patient's clinical course and labs.  Based on those, we will do 

further recommendation.


-Bcx from picc line, cdiff if diarrhea





Thank you, Dr. Cherry, for allowing me to participate in the care of


this patient.  I will follow the patient with you at this


hospitalization.








Discussed with RN





Subjective


Allergies:  


Coded Allergies:  


     No Known Allergies (Unverified , 1/8/19)


Tm 100.4


off pressors


wbc increasing


Fio2 100%





Objective





Last 24 Hour Vital Signs








  Date Time  Temp Pulse Resp B/P (MAP) Pulse Ox O2 Delivery O2 Flow Rate FiO2


 


9/8/20 11:26        100


 


9/8/20 11:00  60 26 113/56 (75) 92   


 


9/8/20 10:00  61 26 120/59 (79) 91   


 


9/8/20 09:00  63 26 114/56 (75) 90   


 


9/8/20 08:00      Mechanical Ventilator  





      Mechanical Ventilator  


 


9/8/20 08:00 99.4 63 25 121/59 (79) 94   


 


9/8/20 08:00        70


 


9/8/20 07:45    117/54    


 


9/8/20 07:24  59 26     70


 


9/8/20 07:00  73 25 117/54 (75) 93   


 


9/8/20 06:30  69 25 95/50 (65) 93   


 


9/8/20 06:00 99.8 81 20 99/45 (63) 91   


 


9/8/20 05:30  66 26 99/45 (63) 93   


 


9/8/20 05:00  83 23 99/52 (68) 96   


 


9/8/20 05:00  74 27     70


 


9/8/20 04:30  63 26 111/47 (68) 96   


 


9/8/20 04:00      Mechanical Ventilator  





      Mechanical Ventilator  


 


9/8/20 04:00 100.4 62 26 109/50 (69) 96   


 


9/8/20 04:00        80


 


9/8/20 04:00  92      


 


9/8/20 03:30  61 26 94/70 (78) 97   


 


9/8/20 03:00  67 26 110/52 (71) 98   


 


9/8/20 02:46  79 28     80


 


9/8/20 02:30  79 25 93/71 (78) 98   


 


9/8/20 02:00    110/50    


 


9/8/20 02:00  71 25 94/80 (85) 96   


 


9/8/20 01:30  65 25 116/55 (75) 97   


 


9/8/20 01:00  65 26 117/52 (73) 98   


 


9/8/20 01:00    116/55    


 


9/8/20 00:30  68 26 121/68 (85) 98   


 


9/8/20 00:00 98.8 67 26 134/68 (90) 97   


 


9/8/20 00:00      Mechanical Ventilator  





      Mechanical Ventilator  


 


9/8/20 00:00    121/68    


 


9/8/20 00:00  72      


 


9/8/20 00:00        80


 


9/7/20 23:30  66 26 121/61 (81) 97   


 


9/7/20 23:00  70 26 115/69 (84) 98   


 


9/7/20 23:00    115/69    


 


9/7/20 22:56  76 30     80


 


9/7/20 22:30  69 25 122/59 (80) 99   


 


9/7/20 22:00    104/54    


 


9/7/20 22:00  67 26 104/54 (71) 100   


 


9/7/20 21:30  72 25 104/56 (72) 100   


 


9/7/20 21:00  68 24 128/65 (86) 100   


 


9/7/20 21:00    128/65    


 


9/7/20 20:30  68 26 100/51 (67) 100   


 


9/7/20 20:00 98.6 72 24 113/56 (75) 100   


 


9/7/20 20:00    113/56    


 


9/7/20 20:00        80


 


9/7/20 20:00      Mechanical Ventilator  





      Mechanical Ventilator  


 


9/7/20 20:00  69      


 


9/7/20 19:30  73 26 98/48 (65) 100   


 


9/7/20 19:00    101/50    


 


9/7/20 19:00  73 25 101/50 (67) 100   


 


9/7/20 18:53  75 27     90


 


9/7/20 18:45  69 24 97/51 (66) 100   


 


9/7/20 18:30 99.1 67 26 99/51 (67) 100   


 


9/7/20 18:15 99.4 76 25 99/53 (68) 100   


 


9/7/20 18:00    97/50    


 


9/7/20 18:00  70 25 97/50 (66) 99   


 


9/7/20 17:30  69 26 96/50 (65) 99   


 


9/7/20 17:00    87/74    


 


9/7/20 17:00  74 25 87/74 (78) 99   


 


9/7/20 16:54  67 26     90


 


9/7/20 16:30  68 26 98/50 (66) 99   


 


9/7/20 16:06  78      


 


9/7/20 16:00 99.2 73 26 98/47 (64) 99   


 


9/7/20 16:00      Mechanical Ventilator  





      Mechanical Ventilator  


 


9/7/20 16:00    98/47    


 


9/7/20 16:00        90


 


9/7/20 15:30  74 23 91/46 (61) 99   


 


9/7/20 15:16        90


 


9/7/20 15:14  74 26     90


 


9/7/20 15:00    107/61    


 


9/7/20 15:00  90 27 107/61 (76) 92   





  90      


 


9/7/20 14:30  79 28 95/51 (66) 92   


 


9/7/20 14:00  78 28 92/53 (66) 91   


 


9/7/20 14:00    92/53    


 


9/7/20 13:45        90


 


9/7/20 13:30  77 26 93/77 (82) 93   


 


9/7/20 13:20  75 26     80


 


9/7/20 13:00    98/50    


 


9/7/20 13:00  70 26 98/50 (66) 93   


 


9/7/20 12:30  71 26 96/50 (65) 95   


 


9/7/20 12:00      Mechanical Ventilator  





      Mechanical Ventilator  


 


9/7/20 12:00 99.6 65 26 95/50 (65) 94   


 


9/7/20 12:00  66      


 


9/7/20 12:00        80


 


9/7/20 12:00    95/50    








Height (Feet):  5


Height (Inches):  3.00


Weight (Pounds):  172


HEENT:  No pale conjunctivae.  No icterus. ETT in place


NECK:  No lymphadenopathy.


CHEST:  Coarse breathing sounds.


HEART:  S1 and S2.


ABDOMEN:  Soft.  PEG tube in place.


EXTREMITIES:  No cyanosis at this time .


SKIN: no rash





Microbiology








 Date/Time


Source Procedure


Growth Status


 


 


 9/6/20 13:35


Blood Blood Culture - Preliminary


NO GROWTH AFTER 24 HOURS Resulted


 


 9/6/20 13:35


Blood Blood Culture - Preliminary


NO GROWTH AFTER 24 HOURS Resulted


 


 9/7/20 14:00


Indwelling Cath Urine Culture - Preliminary


NO GROWTH Resulted











Laboratory Tests








Test


  9/7/20


13:38 9/8/20


04:00 9/8/20


08:48


 


Arterial Blood pH


  7.466


(7.350-7.450) 


  7.479


(7.350-7.450)


 


Arterial Blood Partial


Pressure CO2 28.0 mmHg


(35.0-45.0)  L 


  29.3 mmHg


(35.0-45.0)  L


 


Arterial Blood Partial


Pressure O2 59.6 mmHg


(75.0-100.0)  L 


  56.8 mmHg


(75.0-100.0)  L


 


Arterial Blood HCO3


  19.7 mmol/L


(22.0-26.0)  L 


  21.3 mmol/L


(22.0-26.0)  L


 


Arterial Blood Oxygen


Saturation 91.1 %


()  L 


  88.6 %


()  *L


 


Arterial Blood Base Excess -3.4 (-2-2)  L  -1.7 (-2-2)  


 


Cole Test Positive    Positive  


 


White Blood Count


  


  22.0 K/UL


(4.8-10.8)  H 


 


 


Red Blood Count


  


  2.65 M/UL


(4.20-5.40)  L 


 


 


Hemoglobin


  


  8.6 G/DL


(12.0-16.0)  L 


 


 


Hematocrit


  


  25.4 %


(37.0-47.0)  L 


 


 


Mean Corpuscular Volume  96 FL (80-99)   


 


Mean Corpuscular Hemoglobin


  


  32.6 PG


(27.0-31.0)  H 


 


 


Mean Corpuscular Hemoglobin


Concent 


  34.1 G/DL


(32.0-36.0) 


 


 


Red Cell Distribution Width


  


  15.5 %


(11.6-14.8)  H 


 


 


Platelet Count


  


  100 K/UL


(150-450)  L 


 


 


Mean Platelet Volume


  


  10.7 FL


(6.5-10.1)  H 


 


 


Neutrophils (%) (Auto)


  


  % (45.0-75.0)


  


 


 


Lymphocytes (%) (Auto)


  


  % (20.0-45.0)


  


 


 


Monocytes (%) (Auto)   % (1.0-10.0)   


 


Eosinophils (%) (Auto)   % (0.0-3.0)   


 


Basophils (%) (Auto)   % (0.0-2.0)   


 


Differential Total Cells


Counted 


  100  


  


 


 


Neutrophils % (Manual)  89 % (45-75)  H 


 


Lymphocytes % (Manual)  5 % (20-45)  L 


 


Monocytes % (Manual)  6 % (1-10)   


 


Eosinophils % (Manual)  0 % (0-3)   


 


Basophils % (Manual)  0 % (0-2)   


 


Band Neutrophils  0 % (0-8)   


 


Platelet Estimate  Decreased  L 


 


Platelet Morphology     


 


Giant Platelets  Rare   


 


Polychromasia  1+   


 


Hypochromasia  1+   


 


Anisocytosis  1+   


 


Sodium Level


  


  152 MMOL/L


(136-145)  H 


 


 


Potassium Level


  


  3.5 MMOL/L


(3.5-5.1) 


 


 


Chloride Level


  


  120 MMOL/L


()  H 


 


 


Carbon Dioxide Level


  


  25 MMOL/L


(21-32) 


 


 


Anion Gap


  


  7 mmol/L


(5-15) 


 


 


Blood Urea Nitrogen


  


  46 mg/dL


(7-18)  H 


 


 


Creatinine


  


  1.2 MG/DL


(0.55-1.30) 


 


 


Estimat Glomerular Filtration


Rate 


  46.2 mL/min


(>60) 


 


 


Glucose Level


  


  134 MG/DL


()  H 


 


 


Calcium Level


  


  7.9 MG/DL


(8.5-10.1)  L 


 


 


Phosphorus Level


  


  2.8 MG/DL


(2.5-4.9) 


 


 


Magnesium Level


  


  2.1 MG/DL


(1.8-2.4) 


 


 


Total Bilirubin


  


  0.4 MG/DL


(0.2-1.0) 


 


 


Gamma Glutamyl Transpeptidase  45 U/L (5-85)   


 


Aspartate Amino Transf


(AST/SGOT) 


  81 U/L (15-37)


H 


 


 


Alanine Aminotransferase


(ALT/SGPT) 


  221 U/L


(12-78)  H 


 


 


Alkaline Phosphatase


  


  60 U/L


() 


 


 


C-Reactive Protein,


Quantitative 


  0.6 mg/dL


(0.00-0.90) 


 


 


Pro-B-Type Natriuretic Peptide


  


  6066 pg/mL


(0-125)  H 


 


 


Total Protein


  


  4.4 G/DL


(6.4-8.2)  L 


 


 


Albumin


  


  1.2 G/DL


(3.4-5.0)  L 


 


 


Globulin  3.2 g/dL   


 


Albumin/Globulin Ratio


  


  0.4 (1.0-2.7)


L 


 


 


Thyroid Stimulating Hormone


(TSH) 


  0.894 uiU/mL


(0.358-3.740) 


 











Current Medications








 Medications


  (Trade)  Dose


 Ordered  Sig/Didi


 Route


 PRN Reason  Start Time


 Stop Time Status Last Admin


Dose Admin


 


 Acetaminophen


  (Tylenol)  650 mg  Q4H  PRN


 GT


 FEVER  8/26/20 11:45


 9/25/20 11:44  9/7/20 03:17


 


 


 Chlorhexidine


 Gluconate


  (Beatrice-Hex 2%)  1 applic  DAILY@2000


 TOPIC


   8/31/20 20:00


 11/29/20 19:59  9/7/20 19:54


 


 


 Clotrimazole


  (Lotrimin)  1 applic  Q12HR


 TOPIC


   8/30/20 13:00


 11/28/20 12:59  9/8/20 08:18


 


 


 Dextrose


  (Dextrose 50%)  25 ml  Q30M  PRN


 IV


 Hypoglycemia  8/26/20 11:30


 11/20/20 11:29   


 


 


 Dextrose


  (Dextrose 50%)  50 ml  Q30M  PRN


 IV


 Hypoglycemia  8/26/20 11:30


 11/20/20 11:29   


 


 


 Dopamine HCl/


 Dextrose  250 ml @ 0


 mls/hr  Q24H


 IV


   9/4/20 11:15


 12/3/20 11:14  9/6/20 19:47


 


 


 Hydrocortisone


  (Solu-CORTEF)  100 mg  EVERY 8  HOURS


 IV


   8/30/20 14:00


 11/28/20 13:59  9/8/20 06:07


 


 


 Levothyroxine


 Sodium


  (Synthroid)  75 mcg  DAILY


 GT


   8/27/20 09:00


 9/22/20 08:59  9/8/20 08:16


 


 


 Lorazepam


  (Ativan 2mg/ml


 1ml)  2 mg  Q2H  PRN


 IV


 For Anxiety  9/4/20 17:30


 9/11/20 17:29  9/4/20 17:37


 


 


 Meropenem 1 gm/


 Sodium Chloride  55 ml @ 


 110 mls/hr  Q8H


 IVPB


   9/6/20 12:00


 9/11/20 11:59  9/8/20 04:08


 


 


 Midodrine


  (Pro-Amatine)  10 mg  Q8HR


 GT


   8/28/20 14:00


 11/26/20 13:59  9/8/20 06:07


 


 


 Norepinephrine


 Bitartrate 16 mg/


 Dextrose  500 ml @ 0


 mls/hr  Q24H


 IV


   8/31/20 07:45


 9/29/20 12:59  9/4/20 08:43


 


 


 Ondansetron HCl


  (Zofran)  4 mg  Q6H  PRN


 IVP


 Nausea & Vomiting  8/26/20 12:00


 9/21/20 11:59   


 


 


 Pantoprazole


  (Protonix)  40 mg  DAILY


 IV


   8/27/20 09:00


 9/22/20 08:59  9/8/20 08:17


 


 


 Phenylephrine HCl


 50 mg/Dextrose  250 ml @ 0


 mls/hr  Q24H  PRN


 IV


 For hypotension  8/29/20 17:45


 9/28/20 17:44  8/31/20 01:49


 


 


 Polyethylene


 Glycol


  (Miralax)  17 gm  DAILYPRN  PRN


 GT


 Constipation  8/26/20 12:00


 9/21/20 11:59   


 


 


 Valproic Acid


  (Depakene)  500 mg  EVERY 8  HOURS


 GT


   8/26/20 14:00


 9/21/20 21:59  9/8/20 06:07


 

















Rema Gomes M.D. Sep 8, 2020 11:52

## 2020-09-08 NOTE — NEPHROLOGY PROGRESS NOTE
Assessment/Plan


Problem List:  


(1) DAVID (acute kidney injury)


(2) Respiratory failure requiring intubation


(3) Down's syndrome


(4) Seizure disorder


(5) Hypothyroidism


Assessment





Acute renal failure, likely due to hypotension


Acute respiratory distress, hypoxia


Seizure disorder


Hypothyroidism


Down syndrome


Full code











Fluid challenge with IV fluids and albumin


Midodrine for BP above 100 systolic


Check TSH level


Check


Correct level


Monitor renal parameters


Urine studies


Per orders


Plan


September 8: Status quo.  Labs reviewed.  Overall condition unchanged.  Patient 

was transfused and hemoglobin higher.  Continue current management.  Patient 

remains full code.


September 7: Status quo.  Overall condition poor.  Very low albumin.  

Edematous.  Hypotensive.  Hemoglobin lower.  Anemia work-up ordered.  I favor 

transfusion 2 units of packed RBCs.  Patient remains full code.  I favor 

supportive care only.  Will discuss.


September 6: Electrolyte abnormalities addressed.  Serum creatinine lower.  

Continue per current management.


September 5: Status unchanged.  Lab reviewed.  Serum potassium 2.7.  IV 

potassium chloride ordered.  Serum creatinine low at 1.6 stable.  Blood 

pressure 90s systolic


September 4: Status quo.  Labs reviewed.  Renal parameters stable.  Serum 

creatinine down to 1.6.  Medication list reviewed.  Continues to be on 

midodrine.  Continue per consultants.


September 3: Status quo.  Labs reviewed.  Electrolytes adjusted.  Serum 

creatinine down to 1.8.  Continue per consultants.


September 2: Status quo.  Labs reviewed.  Phosphorus supplement IV given.  

Serum creatinine 2.  Continue per consultants.


September 1: Requires less pressors.  Albumin bolus given.  1 dose of Lasix IV 

ordered as the patient severely edematous.  Patient serum albumin is very low.  

Continue per consultants.


August 31: Continues to be intubated.  Labs reviewed.  Serum creatinine 1.9 

unchanged.  Blood pressure more stable.  Off 1 of the pressors.  Continue to 

monitor renal parameters.  Continue per consultants.  Patient now on 

hydrocortisone 100 mg every 8 hours.  Will decrease IV fluid.  Normal saline 

down to 50 cc an hour.


August 30: Intubated.  Labs reviewed.  Creatinine 1.9 unchanged.  Continue same 

treatment plan.  Per consultants.  Overall poor prognosis since the patient 

remains on pressors and her pulmonary status is worsening.


August 29: Remains intubated.  Labs reviewed.  Creatinine 1.9.  Blood pressure 

systolic 90s.  Continue per consultants.


August 28: Remains intubated.  Labs reviewed.  Serum creatinine lower to 2.  

Vancomycin level lower.  Remains hypotensive on pressors.  Will increase 

midodrine to 10 mg every 8 hours.  Continue per consultants.  Continue to 

monitor renal parameters.


August 27: Patient now in ICU.  Intubated.  On pressors.  Labs reviewed.  Will 

increase midodrine.  Aim to keep blood pressure over 100 systolic.  Will give 

albumin bolus.  Will check vancomycin level which was elevated when checked 

previously on August 24.  Will monitor renal parameters.  Continue per 

consultants.





Subjective


ROS Limited/Unobtainable:  Yes





Objective


Objective





Last 24 Hour Vital Signs








  Date Time  Temp Pulse Resp B/P (MAP) Pulse Ox O2 Delivery O2 Flow Rate FiO2


 


9/8/20 09:00  63 26 114/56 (75) 90   


 


9/8/20 08:00      Mechanical Ventilator  





      Mechanical Ventilator  


 


9/8/20 08:00 99.4 63 25 121/59 (79) 94   


 


9/8/20 08:00        70


 


9/8/20 07:45    117/54    


 


9/8/20 07:24  59 26     70


 


9/8/20 07:00  73 25 117/54 (75) 93   


 


9/8/20 06:30  69 25 95/50 (65) 93   


 


9/8/20 06:00 99.8 81 20 99/45 (63) 91   


 


9/8/20 05:30  66 26 99/45 (63) 93   


 


9/8/20 05:00  83 23 99/52 (68) 96   


 


9/8/20 05:00  74 27     70


 


9/8/20 04:30  63 26 111/47 (68) 96   


 


9/8/20 04:00      Mechanical Ventilator  





      Mechanical Ventilator  


 


9/8/20 04:00 100.4 62 26 109/50 (69) 96   


 


9/8/20 04:00        80


 


9/8/20 04:00  92      


 


9/8/20 03:30  61 26 94/70 (78) 97   


 


9/8/20 03:00  67 26 110/52 (71) 98   


 


9/8/20 02:46  79 28     80


 


9/8/20 02:30  79 25 93/71 (78) 98   


 


9/8/20 02:00    110/50    


 


9/8/20 02:00  71 25 94/80 (85) 96   


 


9/8/20 01:30  65 25 116/55 (75) 97   


 


9/8/20 01:00  65 26 117/52 (73) 98   


 


9/8/20 01:00    116/55    


 


9/8/20 00:30  68 26 121/68 (85) 98   


 


9/8/20 00:00 98.8 67 26 134/68 (90) 97   


 


9/8/20 00:00      Mechanical Ventilator  





      Mechanical Ventilator  


 


9/8/20 00:00    121/68    


 


9/8/20 00:00  72      


 


9/8/20 00:00        80


 


9/7/20 23:30  66 26 121/61 (81) 97   


 


9/7/20 23:00  70 26 115/69 (84) 98   


 


9/7/20 23:00    115/69    


 


9/7/20 22:56  76 30     80


 


9/7/20 22:30  69 25 122/59 (80) 99   


 


9/7/20 22:00    104/54    


 


9/7/20 22:00  67 26 104/54 (71) 100   


 


9/7/20 21:30  72 25 104/56 (72) 100   


 


9/7/20 21:00  68 24 128/65 (86) 100   


 


9/7/20 21:00    128/65    


 


9/7/20 20:30  68 26 100/51 (67) 100   


 


9/7/20 20:00 98.6 72 24 113/56 (75) 100   


 


9/7/20 20:00    113/56    


 


9/7/20 20:00        80


 


9/7/20 20:00      Mechanical Ventilator  





      Mechanical Ventilator  


 


9/7/20 20:00  69      


 


9/7/20 19:30  73 26 98/48 (65) 100   


 


9/7/20 19:00    101/50    


 


9/7/20 19:00  73 25 101/50 (67) 100   


 


9/7/20 18:53  75 27     90


 


9/7/20 18:45  69 24 97/51 (66) 100   


 


9/7/20 18:30 99.1 67 26 99/51 (67) 100   


 


9/7/20 18:15 99.4 76 25 99/53 (68) 100   


 


9/7/20 18:00    97/50    


 


9/7/20 18:00  70 25 97/50 (66) 99   


 


9/7/20 17:30  69 26 96/50 (65) 99   


 


9/7/20 17:00    87/74    


 


9/7/20 17:00  74 25 87/74 (78) 99   


 


9/7/20 16:54  67 26     90


 


9/7/20 16:30  68 26 98/50 (66) 99   


 


9/7/20 16:06  78      


 


9/7/20 16:00 99.2 73 26 98/47 (64) 99   


 


9/7/20 16:00      Mechanical Ventilator  





      Mechanical Ventilator  


 


9/7/20 16:00    98/47    


 


9/7/20 16:00        90


 


9/7/20 15:30  74 23 91/46 (61) 99   


 


9/7/20 15:16        90


 


9/7/20 15:14  74 26     90


 


9/7/20 15:00    107/61    


 


9/7/20 15:00  90 27 107/61 (76) 92   





  90      


 


9/7/20 14:30  79 28 95/51 (66) 92   


 


9/7/20 14:00  78 28 92/53 (66) 91   


 


9/7/20 14:00    92/53    


 


9/7/20 13:45        90


 


9/7/20 13:30  77 26 93/77 (82) 93   


 


9/7/20 13:20  75 26     80


 


9/7/20 13:00    98/50    


 


9/7/20 13:00  70 26 98/50 (66) 93   


 


9/7/20 12:30  71 26 96/50 (65) 95   


 


9/7/20 12:00      Mechanical Ventilator  





      Mechanical Ventilator  


 


9/7/20 12:00 99.6 65 26 95/50 (65) 94   


 


9/7/20 12:00  66      


 


9/7/20 12:00        80


 


9/7/20 12:00    95/50    


 


9/7/20 11:30  67 26 98/50 (66) 94   


 


9/7/20 11:06  86 30     80


 


9/7/20 11:00    93/48    


 


9/7/20 11:00  66 26 93/48 (63) 93   


 


9/7/20 10:30  68 26 99/53 (68) 92   

















Intake and Output  


 


 9/7/20 9/8/20





 19:00 07:00


 


Intake Total 668.156 ml 1144.068 ml


 


Output Total 1355 ml 870 ml


 


Balance -686.844 ml 274.068 ml


 


  


 


Free Water 10 ml 110 ml


 


IV Total 158.156 ml 134.068 ml


 


Tube Feeding 500 ml 600 ml


 


Blood Product  300 ml


 


Output Urine Total 1355 ml 870 ml


 


# Bowel Movements 1 3








Laboratory Tests


9/7/20 10:09: 


Iron Level 18L, Total Iron Binding Capacity 112L, Percent Iron Saturation 16, 

Unsaturated Iron Binding 94L, Ferritin 732H, Vitamin B12 Level 989H, Folate 10.0


9/7/20 13:38: 


Arterial Blood pH 7.466H, Arterial Blood Partial Pressure CO2 28.0L, Arterial 

Blood Partial Pressure O2 59.6L, Arterial Blood HCO3 19.7L, Arterial Blood 

Oxygen Saturation 91.1L, Arterial Blood Base Excess -3.4L, Cole Test Positive


9/8/20 04:00: 


White Blood Count 22.0H, Red Blood Count 2.65L, Hemoglobin 8.6L, Hematocrit 

25.4L, Mean Corpuscular Volume 96, Mean Corpuscular Hemoglobin 32.6H, Mean 

Corpuscular Hemoglobin Concent 34.1, Red Cell Distribution Width 15.5H, 

Platelet Count 100L, Mean Platelet Volume 10.7H, Neutrophils (%) (Auto) , 

Lymphocytes (%) (Auto) , Monocytes (%) (Auto) , Eosinophils (%) (Auto) , 

Basophils (%) (Auto) , Neutrophils % (Manual) [Pending], Lymphocytes % (Manual) 

[Pending], Platelet Estimate [Pending], Platelet Morphology [Pending], Sodium 

Level 152H, Potassium Level 3.5, Chloride Level 120H, Carbon Dioxide Level 25, 

Anion Gap 7, Blood Urea Nitrogen 46H, Creatinine 1.2, Estimat Glomerular 

Filtration Rate 46.2, Glucose Level 134H, Calcium Level 7.9L, Phosphorus Level 

2.8, Magnesium Level 2.1, Total Bilirubin 0.4, Gamma Glutamyl Transpeptidase 45

, Aspartate Amino Transf (AST/SGOT) 81H, Alanine Aminotransferase (ALT/SGPT) 

221H, Alkaline Phosphatase 60, C-Reactive Protein, Quantitative 0.6, Pro-B-Type 

Natriuretic Peptide 6066H, Total Protein 4.4L, Albumin 1.2L, Globulin 3.2, 

Albumin/Globulin Ratio 0.4L, Thyroid Stimulating Hormone (TSH) 0.894


9/8/20 08:48: 


Arterial Blood pH 7.479H, Arterial Blood Partial Pressure CO2 29.3L, Arterial 

Blood Partial Pressure O2 56.8L, Arterial Blood HCO3 21.3L, Arterial Blood 

Oxygen Saturation 88.6*L, Arterial Blood Base Excess -1.7, Cole Test Positive


Height (Feet):  5


Height (Inches):  3.00


Weight (Pounds):  172


General Appearance:  no apparent distress, lethargic


EENT:  other - Connected to ventilator


Cardiovascular:  normal rate


Respiratory/Chest:  decreased breath sounds


Abdomen:  distended


Extremities:  other - Edematous extremities











Lam Nino MD Sep 8, 2020 10:06

## 2020-09-08 NOTE — NUR
NURSE HAND-OFF REPORT: 



Latest Vital Signs: Temperature 99.8 , Pulse 69 , B/P 95 /50 , Respiratory Rate 25 , O2 SAT 
93 , Mechanical Ventilator, O2 Flow Rate 15.0 .  

Vital Sign Comment: 



EKG Rhythm: Sinus Rhythm

Rhythm change?: N 

MD Notified?: -

MD Response: 



Latest Momin Fall Score: 50  

Fall Risk: High Risk 

Safety Measures: Call light Within Reach, Bed Alarm Zone 2, Side Rails Side Rails x3, Bed 
position Low and Locked.

Fall Precautions: 

Yellow Socks

Yellow Gown

Door Sign

Patient Fall Education



Report given to Mayco TRAYLOR

## 2020-09-08 NOTE — NUR
NURSE NOTES:

Suctioned tn whitish secretions  from ETT and mouth. Turned q 2hrs prn with good skin care 
done.

## 2020-09-08 NOTE — NUR
NURSE NOTES:

Received report from LAYTON Faulkner. Patient asleep and responsive to verbal with eye tracking. 
ETT 7.5/22cm at lip line with vent setting AC 26, , FiO2 80% and Peep 12. Gtube intact 
and running with jevity 1.2 @50ml/hr. Valle intact and draining with yellow color urine. 
Left upper arm PICC intact and clean with TKO. kept dry, clean, comfortable and HOB>30. Will 
continue plan of care.

## 2020-09-08 NOTE — INTERNAL MED PROGRESS NOTE
Subjective


Date of Service:  Sep 8, 2020


Physician Name


Sanya Schultz


Attending Physician


Chano Cherry MD





Current Medications








 Medications


  (Trade)  Dose


 Ordered  Sig/Didi


 Route


 PRN Reason  Start Time


 Stop Time Status Last Admin


Dose Admin


 


 Acetaminophen


  (Tylenol)  650 mg  Q4H  PRN


 GT


 FEVER  8/26/20 11:45


 9/25/20 11:44  9/7/20 03:17


 


 


 Amikacin Protocol


  (Amikacin


 pharmacy to dose)  1 ea  DAILY  PRN


 MISC


 Per rx protocol  9/8/20 12:00


 10/8/20 11:59   


 


 


 Amikacin Sulfate


 1000 mg/Sodium


 Chloride  114 ml @ 


 114 mls/hr  Q36H


 IV


   9/8/20 14:00


 9/15/20 13:59  9/8/20 13:56


 


 


 Chlorhexidine


 Gluconate


  (Beatrice-Hex 2%)  1 applic  DAILY@2000


 TOPIC


   8/31/20 20:00


 11/29/20 19:59  9/7/20 19:54


 


 


 Clotrimazole


  (Lotrimin)  1 applic  Q12HR


 TOPIC


   8/30/20 13:00


 11/28/20 12:59  9/8/20 08:18


 


 


 Dextrose


  (Dextrose 50%)  25 ml  Q30M  PRN


 IV


 Hypoglycemia  8/26/20 11:30


 11/20/20 11:29   


 


 


 Dextrose


  (Dextrose 50%)  50 ml  Q30M  PRN


 IV


 Hypoglycemia  8/26/20 11:30


 11/20/20 11:29   


 


 


 Dopamine HCl/


 Dextrose  250 ml @ 0


 mls/hr  Q24H


 IV


   9/4/20 11:15


 12/3/20 11:14  9/6/20 19:47


 


 


 Hydrocortisone


  (Solu-CORTEF)  100 mg  EVERY 8  HOURS


 IV


   8/30/20 14:00


 11/28/20 13:59  9/8/20 13:38


 


 


 Levothyroxine


 Sodium


  (Synthroid)  75 mcg  DAILY


 GT


   8/27/20 09:00


 9/22/20 08:59  9/8/20 08:16


 


 


 Lorazepam


  (Ativan 2mg/ml


 1ml)  2 mg  Q2H  PRN


 IV


 For Anxiety  9/4/20 17:30


 9/11/20 17:29  9/4/20 17:37


 


 


 Meropenem 1 gm/


 Sodium Chloride  55 ml @ 


 110 mls/hr  Q8H


 IVPB


   9/6/20 12:00


 9/11/20 11:59  9/8/20 12:20


 


 


 Midodrine


  (Pro-Amatine)  10 mg  Q8HR


 GT


   8/28/20 14:00


 11/26/20 13:59  9/8/20 13:37


 


 


 Norepinephrine


 Bitartrate 16 mg/


 Dextrose  500 ml @ 0


 mls/hr  Q24H


 IV


   8/31/20 07:45


 9/29/20 12:59  9/4/20 08:43


 


 


 Ondansetron HCl


  (Zofran)  4 mg  Q6H  PRN


 IVP


 Nausea & Vomiting  8/26/20 12:00


 9/21/20 11:59   


 


 


 Pantoprazole


  (Protonix)  40 mg  DAILY


 IV


   8/27/20 09:00


 9/22/20 08:59  9/8/20 08:17


 


 


 Phenylephrine HCl


 50 mg/Dextrose  250 ml @ 0


 mls/hr  Q24H  PRN


 IV


 For hypotension  8/29/20 17:45


 9/28/20 17:44  8/31/20 01:49


 


 


 Polyethylene


 Glycol


  (Miralax)  17 gm  DAILYPRN  PRN


 GT


 Constipation  8/26/20 12:00


 9/21/20 11:59   


 


 


 Valproic Acid


  (Depakene)  500 mg  EVERY 8  HOURS


 GT


   8/26/20 14:00


 9/21/20 21:59  9/8/20 13:37


 








Allergies:  


Coded Allergies:  


     No Known Allergies (Unverified , 1/8/19)


ROS Limited/Unobtainable:  Yes


Subjective


60 YO F with Down's syndrome admitted with hypoxia.  Now sepsis and pneumonia.  

Cover for Int Phi-DR Hung.  ICU.  Intubated and sedated





Objective





Last Vital Signs








  Date Time  Temp Pulse Resp B/P (MAP) Pulse Ox O2 Delivery O2 Flow Rate FiO2


 


9/8/20 18:00  84 23 138/99 (112) 93   


 


9/8/20 16:00      Mechanical Ventilator  





      Mechanical Ventilator  


 


9/8/20 16:00        100


 


9/8/20 16:00 99.3       











Laboratory Tests








Test


  9/8/20


04:00 9/8/20


08:48


 


White Blood Count


  22.0 K/UL


(4.8-10.8)  H 


 


 


Red Blood Count


  2.65 M/UL


(4.20-5.40)  L 


 


 


Hemoglobin


  8.6 G/DL


(12.0-16.0)  L 


 


 


Hematocrit


  25.4 %


(37.0-47.0)  L 


 


 


Mean Corpuscular Volume 96 FL (80-99)   


 


Mean Corpuscular Hemoglobin


  32.6 PG


(27.0-31.0)  H 


 


 


Mean Corpuscular Hemoglobin


Concent 34.1 G/DL


(32.0-36.0) 


 


 


Red Cell Distribution Width


  15.5 %


(11.6-14.8)  H 


 


 


Platelet Count


  100 K/UL


(150-450)  L 


 


 


Mean Platelet Volume


  10.7 FL


(6.5-10.1)  H 


 


 


Neutrophils (%) (Auto)


  % (45.0-75.0)


  


 


 


Lymphocytes (%) (Auto)


  % (20.0-45.0)


  


 


 


Monocytes (%) (Auto)  % (1.0-10.0)   


 


Eosinophils (%) (Auto)  % (0.0-3.0)   


 


Basophils (%) (Auto)  % (0.0-2.0)   


 


Differential Total Cells


Counted 100  


  


 


 


Neutrophils % (Manual) 89 % (45-75)  H 


 


Lymphocytes % (Manual) 5 % (20-45)  L 


 


Monocytes % (Manual) 6 % (1-10)   


 


Eosinophils % (Manual) 0 % (0-3)   


 


Basophils % (Manual) 0 % (0-2)   


 


Band Neutrophils 0 % (0-8)   


 


Platelet Estimate Decreased  L 


 


Platelet Morphology    


 


Giant Platelets Rare   


 


Polychromasia 1+   


 


Hypochromasia 1+   


 


Anisocytosis 1+   


 


Sodium Level


  152 MMOL/L


(136-145)  H 


 


 


Potassium Level


  3.5 MMOL/L


(3.5-5.1) 


 


 


Chloride Level


  120 MMOL/L


()  H 


 


 


Carbon Dioxide Level


  25 MMOL/L


(21-32) 


 


 


Anion Gap


  7 mmol/L


(5-15) 


 


 


Blood Urea Nitrogen


  46 mg/dL


(7-18)  H 


 


 


Creatinine


  1.2 MG/DL


(0.55-1.30) 


 


 


Estimat Glomerular Filtration


Rate 46.2 mL/min


(>60) 


 


 


Glucose Level


  134 MG/DL


()  H 


 


 


Calcium Level


  7.9 MG/DL


(8.5-10.1)  L 


 


 


Phosphorus Level


  2.8 MG/DL


(2.5-4.9) 


 


 


Magnesium Level


  2.1 MG/DL


(1.8-2.4) 


 


 


Total Bilirubin


  0.4 MG/DL


(0.2-1.0) 


 


 


Gamma Glutamyl Transpeptidase 45 U/L (5-85)   


 


Aspartate Amino Transf


(AST/SGOT) 81 U/L (15-37)


H 


 


 


Alanine Aminotransferase


(ALT/SGPT) 221 U/L


(12-78)  H 


 


 


Alkaline Phosphatase


  60 U/L


() 


 


 


C-Reactive Protein,


Quantitative 0.6 mg/dL


(0.00-0.90) 


 


 


Pro-B-Type Natriuretic Peptide


  6066 pg/mL


(0-125)  H 


 


 


Total Protein


  4.4 G/DL


(6.4-8.2)  L 


 


 


Albumin


  1.2 G/DL


(3.4-5.0)  L 


 


 


Globulin 3.2 g/dL   


 


Albumin/Globulin Ratio


  0.4 (1.0-2.7)


L 


 


 


Thyroid Stimulating Hormone


(TSH) 0.894 uiU/mL


(0.358-3.740) 


 


 


Arterial Blood pH


  


  7.479


(7.350-7.450)


 


Arterial Blood Partial


Pressure CO2 


  29.3 mmHg


(35.0-45.0)  L


 


Arterial Blood Partial


Pressure O2 


  56.8 mmHg


(75.0-100.0)  L


 


Arterial Blood HCO3


  


  21.3 mmol/L


(22.0-26.0)  L


 


Arterial Blood Oxygen


Saturation 


  88.6 %


()  *L


 


Arterial Blood Base Excess  -1.7 (-2-2)  


 


Cole Test  Positive  











Microbiology








 Date/Time


Source Procedure


Growth Status


 


 


 9/6/20 13:35


Blood Blood Culture - Preliminary


NO GROWTH AFTER 24 HOURS Resulted


 


 9/6/20 13:35


Blood Blood Culture - Preliminary


NO GROWTH AFTER 24 HOURS Resulted


 


 9/7/20 14:00


Indwelling Cath Urine Culture - Preliminary


NO GROWTH Resulted

















Intake and Output  


 


 9/7/20 9/8/20





 19:00 07:00


 


Intake Total 668.156 ml 1144.068 ml


 


Output Total 1355 ml 870 ml


 


Balance -686.844 ml 274.068 ml


 


  


 


Free Water 10 ml 110 ml


 


IV Total 158.156 ml 134.068 ml


 


Tube Feeding 500 ml 600 ml


 


Blood Product  300 ml


 


Output Urine Total 1355 ml 870 ml


 


# Bowel Movements 1 3








Objective


General Appearance:  WD/WN, no apparent distress, alert


EENT:  PERRL/EOMI, normal ENT inspection


Neck:  non-tender, normal alignment, supple, normal inspection


Cardiovascular:  normal peripheral pulses, normal rate, regular rhythm, no 

gallop/murmur, no JVD


Respiratory/Chest:  Mech vent; decreased breath sounds, crackles/rales, rhonchi 

- bilaterally, expiratory wheezing


Abdomen:  normal bowel sounds, non tender, soft, no organomegaly, no mass


Extremities:  normal range of motion


Neurologic:  CNs II-XII grossly normal


Skin:  normal pigmentation, warm/dry





Assessment/Plan


Problem List:  


(1) HCAP (healthcare-associated pneumonia)


Assessment & Plan:  Strep Group G.  Continue daptomycin per ID=Dr Gomes.  

Pulmonary/Critical care=DR Cherry.  COVID NEG





(2) Sepsis


Assessment & Plan:  Staph haemolyticus.  Continue daptomycin and ceftriaxone 

per ID=Dr Gomes





(3) Down's syndrome


(4) Dysphagia


Assessment & Plan:  S/P PEG





(5) Seizure disorder


Assessment & Plan:  Continue keppra and depakote





(6) Hypothyroidism


Assessment & Plan:  Continue synthroid





(7) Acute respiratory failure


Assessment & Plan:  Pulmonary = Dr Cherry; [facility] vent














Sanya Schultz MD Sep 8, 2020 19:12

## 2020-09-08 NOTE — NUR
NURSE NOTES:

Seen by Dr. Jorgensen and assessed patient. Updated patient's status. No new orders at this 
time.

## 2020-09-09 VITALS — SYSTOLIC BLOOD PRESSURE: 98 MMHG | DIASTOLIC BLOOD PRESSURE: 53 MMHG

## 2020-09-09 VITALS — SYSTOLIC BLOOD PRESSURE: 120 MMHG | DIASTOLIC BLOOD PRESSURE: 63 MMHG

## 2020-09-09 VITALS — SYSTOLIC BLOOD PRESSURE: 114 MMHG | DIASTOLIC BLOOD PRESSURE: 57 MMHG

## 2020-09-09 VITALS — SYSTOLIC BLOOD PRESSURE: 107 MMHG | DIASTOLIC BLOOD PRESSURE: 67 MMHG

## 2020-09-09 VITALS — DIASTOLIC BLOOD PRESSURE: 63 MMHG | SYSTOLIC BLOOD PRESSURE: 107 MMHG

## 2020-09-09 VITALS — SYSTOLIC BLOOD PRESSURE: 108 MMHG | DIASTOLIC BLOOD PRESSURE: 58 MMHG

## 2020-09-09 VITALS — SYSTOLIC BLOOD PRESSURE: 120 MMHG | DIASTOLIC BLOOD PRESSURE: 69 MMHG

## 2020-09-09 VITALS — DIASTOLIC BLOOD PRESSURE: 50 MMHG | SYSTOLIC BLOOD PRESSURE: 93 MMHG

## 2020-09-09 VITALS — DIASTOLIC BLOOD PRESSURE: 53 MMHG | SYSTOLIC BLOOD PRESSURE: 104 MMHG

## 2020-09-09 VITALS — SYSTOLIC BLOOD PRESSURE: 101 MMHG | DIASTOLIC BLOOD PRESSURE: 51 MMHG

## 2020-09-09 VITALS — DIASTOLIC BLOOD PRESSURE: 57 MMHG | SYSTOLIC BLOOD PRESSURE: 95 MMHG

## 2020-09-09 VITALS — SYSTOLIC BLOOD PRESSURE: 109 MMHG | DIASTOLIC BLOOD PRESSURE: 50 MMHG

## 2020-09-09 VITALS — DIASTOLIC BLOOD PRESSURE: 58 MMHG | SYSTOLIC BLOOD PRESSURE: 121 MMHG

## 2020-09-09 VITALS — DIASTOLIC BLOOD PRESSURE: 47 MMHG | SYSTOLIC BLOOD PRESSURE: 97 MMHG

## 2020-09-09 VITALS — SYSTOLIC BLOOD PRESSURE: 114 MMHG | DIASTOLIC BLOOD PRESSURE: 49 MMHG

## 2020-09-09 VITALS — SYSTOLIC BLOOD PRESSURE: 107 MMHG | DIASTOLIC BLOOD PRESSURE: 50 MMHG

## 2020-09-09 VITALS — DIASTOLIC BLOOD PRESSURE: 62 MMHG | SYSTOLIC BLOOD PRESSURE: 119 MMHG

## 2020-09-09 VITALS — DIASTOLIC BLOOD PRESSURE: 63 MMHG | SYSTOLIC BLOOD PRESSURE: 130 MMHG

## 2020-09-09 VITALS — SYSTOLIC BLOOD PRESSURE: 113 MMHG | DIASTOLIC BLOOD PRESSURE: 65 MMHG

## 2020-09-09 VITALS — SYSTOLIC BLOOD PRESSURE: 105 MMHG | DIASTOLIC BLOOD PRESSURE: 56 MMHG

## 2020-09-09 VITALS — SYSTOLIC BLOOD PRESSURE: 117 MMHG | DIASTOLIC BLOOD PRESSURE: 54 MMHG

## 2020-09-09 VITALS — DIASTOLIC BLOOD PRESSURE: 56 MMHG | SYSTOLIC BLOOD PRESSURE: 110 MMHG

## 2020-09-09 VITALS — DIASTOLIC BLOOD PRESSURE: 50 MMHG | SYSTOLIC BLOOD PRESSURE: 109 MMHG

## 2020-09-09 VITALS — DIASTOLIC BLOOD PRESSURE: 67 MMHG | SYSTOLIC BLOOD PRESSURE: 136 MMHG

## 2020-09-09 VITALS — SYSTOLIC BLOOD PRESSURE: 128 MMHG | DIASTOLIC BLOOD PRESSURE: 60 MMHG

## 2020-09-09 LAB
ADD MANUAL DIFF: YES
ALBUMIN SERPL-MCNC: 1.2 G/DL (ref 3.4–5)
ALBUMIN/GLOB SERPL: 0.4 {RATIO} (ref 1–2.7)
ALP SERPL-CCNC: 60 U/L (ref 46–116)
ALT SERPL-CCNC: 169 U/L (ref 12–78)
ANION GAP SERPL CALC-SCNC: 6 MMOL/L (ref 5–15)
APPEARANCE UR: CLEAR
APTT PPP: YELLOW S
AST SERPL-CCNC: 54 U/L (ref 15–37)
BILIRUB SERPL-MCNC: 0.4 MG/DL (ref 0.2–1)
BUN SERPL-MCNC: 44 MG/DL (ref 7–18)
CALCIUM SERPL-MCNC: 7.5 MG/DL (ref 8.5–10.1)
CHLORIDE SERPL-SCNC: 119 MMOL/L (ref 98–107)
CO2 SERPL-SCNC: 26 MMOL/L (ref 21–32)
CREAT SERPL-MCNC: 1.1 MG/DL (ref 0.55–1.3)
ERYTHROCYTE [DISTWIDTH] IN BLOOD BY AUTOMATED COUNT: 15.1 % (ref 11.6–14.8)
GLOBULIN SER-MCNC: 3.4 G/DL
GLUCOSE UR STRIP-MCNC: NEGATIVE MG/DL
HCT VFR BLD CALC: 26.2 % (ref 37–47)
HGB BLD-MCNC: 9 G/DL (ref 12–16)
KETONES UR QL STRIP: NEGATIVE
LEUKOCYTE ESTERASE UR QL STRIP: NEGATIVE
MCV RBC AUTO: 97 FL (ref 80–99)
NITRITE UR QL STRIP: NEGATIVE
PH UR STRIP: 6 [PH] (ref 4.5–8)
PHOSPHATE SERPL-MCNC: 3 MG/DL (ref 2.5–4.9)
PLATELET # BLD: 127 K/UL (ref 150–450)
POTASSIUM SERPL-SCNC: 3.8 MMOL/L (ref 3.5–5.1)
PROT UR QL STRIP: (no result)
RBC # BLD AUTO: 2.71 M/UL (ref 4.2–5.4)
SODIUM SERPL-SCNC: 151 MMOL/L (ref 136–145)
SP GR UR STRIP: 1.01 (ref 1–1.03)
UROBILINOGEN UR-MCNC: NORMAL MG/DL (ref 0–1)
WBC # BLD AUTO: 22.8 K/UL (ref 4.8–10.8)

## 2020-09-09 RX ADMIN — VALPROIC ACID SCH MG: 250 SOLUTION ORAL at 13:30

## 2020-09-09 RX ADMIN — MIDODRINE HYDROCHLORIDE SCH MG: 10 TABLET ORAL at 21:28

## 2020-09-09 RX ADMIN — VALPROIC ACID SCH MG: 250 SOLUTION ORAL at 05:33

## 2020-09-09 RX ADMIN — SODIUM CHLORIDE SCH MLS/HR: 900 INJECTION, SOLUTION INTRAVENOUS at 07:45

## 2020-09-09 RX ADMIN — MEROPENEM SCH MLS/HR: 1 INJECTION INTRAVENOUS at 20:47

## 2020-09-09 RX ADMIN — VALPROIC ACID SCH MG: 250 SOLUTION ORAL at 21:27

## 2020-09-09 RX ADMIN — MIDODRINE HYDROCHLORIDE SCH MG: 10 TABLET ORAL at 13:30

## 2020-09-09 RX ADMIN — HYDROCORTISONE SODIUM SUCCINATE SCH MG: 100 INJECTION, POWDER, FOR SOLUTION INTRAMUSCULAR; INTRAVENOUS at 21:28

## 2020-09-09 RX ADMIN — MIDODRINE HYDROCHLORIDE SCH MG: 10 TABLET ORAL at 05:32

## 2020-09-09 RX ADMIN — MEROPENEM SCH MLS/HR: 1 INJECTION INTRAVENOUS at 03:32

## 2020-09-09 RX ADMIN — HYDROCORTISONE SODIUM SUCCINATE SCH MG: 100 INJECTION, POWDER, FOR SOLUTION INTRAMUSCULAR; INTRAVENOUS at 13:30

## 2020-09-09 RX ADMIN — CHLORHEXIDINE GLUCONATE SCH APPLIC: 213 SOLUTION TOPICAL at 20:44

## 2020-09-09 RX ADMIN — DOPAMINE HYDROCHLORIDE IN DEXTROSE SCH MLS/HR: 1.6 INJECTION, SOLUTION INTRAVENOUS at 11:15

## 2020-09-09 RX ADMIN — PANTOPRAZOLE SODIUM SCH MG: 40 INJECTION, POWDER, FOR SOLUTION INTRAVENOUS at 08:57

## 2020-09-09 RX ADMIN — MEROPENEM SCH MLS/HR: 1 INJECTION INTRAVENOUS at 11:13

## 2020-09-09 RX ADMIN — HYDROCORTISONE SODIUM SUCCINATE SCH MG: 100 INJECTION, POWDER, FOR SOLUTION INTRAMUSCULAR; INTRAVENOUS at 05:32

## 2020-09-09 NOTE — INTERNAL MED PROGRESS NOTE
Subjective


Date of Service:  Sep 9, 2020


Physician Name


Sanya Schultz


Attending Physician


Chano Cherry MD





Current Medications








 Medications


  (Trade)  Dose


 Ordered  Sig/Didi


 Route


 PRN Reason  Start Time


 Stop Time Status Last Admin


Dose Admin


 


 Acetaminophen


  (Tylenol)  650 mg  Q4H  PRN


 GT


 FEVER  8/26/20 11:45


 9/25/20 11:44  9/7/20 03:17


 


 


 Amikacin Protocol


  (Amikacin


 pharmacy to dose)  1 ea  DAILY  PRN


 MISC


 Per rx protocol  9/8/20 12:00


 10/8/20 11:59   


 


 


 Amikacin Sulfate


 1000 mg/Sodium


 Chloride  114 ml @ 


 114 mls/hr  Q36H


 IV


   9/8/20 14:00


 9/15/20 13:59  9/8/20 13:56


 


 


 Chlorhexidine


 Gluconate


  (Beatrice-Hex 2%)  1 applic  DAILY@2000


 TOPIC


   8/31/20 20:00


 11/29/20 19:59  9/8/20 20:03


 


 


 Clotrimazole


  (Lotrimin)  1 applic  Q12HR


 TOPIC


   8/30/20 13:00


 11/28/20 12:59  9/9/20 08:57


 


 


 Dextrose


  (Dextrose 50%)  25 ml  Q30M  PRN


 IV


 Hypoglycemia  8/26/20 11:30


 11/20/20 11:29   


 


 


 Dextrose


  (Dextrose 50%)  50 ml  Q30M  PRN


 IV


 Hypoglycemia  8/26/20 11:30


 11/20/20 11:29   


 


 


 Dopamine HCl/


 Dextrose  250 ml @ 0


 mls/hr  Q24H


 IV


   9/4/20 11:15


 12/3/20 11:14  9/6/20 19:47


 


 


 Hydrocortisone


  (Solu-CORTEF)  100 mg  EVERY 8  HOURS


 IV


   8/30/20 14:00


 11/28/20 13:59  9/9/20 05:32


 


 


 Levothyroxine


 Sodium


  (Synthroid)  75 mcg  DAILY


 GT


   8/27/20 09:00


 9/22/20 08:59  9/9/20 08:57


 


 


 Lorazepam


  (Ativan 2mg/ml


 1ml)  2 mg  Q2H  PRN


 IV


 For Anxiety  9/4/20 17:30


 9/11/20 17:29  9/4/20 17:37


 


 


 Meropenem 1 gm/


 Sodium Chloride  55 ml @ 


 110 mls/hr  Q8H


 IVPB


   9/6/20 12:00


 9/11/20 11:59  9/9/20 11:13


 


 


 Midodrine


  (Pro-Amatine)  10 mg  Q8HR


 GT


   8/28/20 14:00


 11/26/20 13:59  9/9/20 05:32


 


 


 Norepinephrine


 Bitartrate 16 mg/


 Dextrose  500 ml @ 0


 mls/hr  Q24H


 IV


   8/31/20 07:45


 9/29/20 12:59  9/4/20 08:43


 


 


 Ondansetron HCl


  (Zofran)  4 mg  Q6H  PRN


 IVP


 Nausea & Vomiting  8/26/20 12:00


 9/21/20 11:59   


 


 


 Pantoprazole


  (Protonix)  40 mg  DAILY


 IV


   8/27/20 09:00


 9/22/20 08:59  9/9/20 08:57


 


 


 Phenylephrine HCl


 50 mg/Dextrose  250 ml @ 0


 mls/hr  Q24H  PRN


 IV


 For hypotension  8/29/20 17:45


 9/28/20 17:44  8/31/20 01:49


 


 


 Polyethylene


 Glycol


  (Miralax)  17 gm  DAILYPRN  PRN


 GT


 Constipation  8/26/20 12:00


 9/21/20 11:59   


 


 


 Valproic Acid


  (Depakene)  500 mg  EVERY 8  HOURS


 GT


   8/26/20 14:00


 9/21/20 21:59  9/9/20 05:33


 








Allergies:  


Coded Allergies:  


     No Known Allergies (Unverified , 1/8/19)


ROS Limited/Unobtainable:  Yes


Subjective


58 YO F with Down's syndrome admitted with hypoxia.  Now sepsis and pneumonia.  

Cover for Int Med-DR Hung.  ICU.  Intubated and sedated





Objective





Last Vital Signs








  Date Time  Temp Pulse Resp B/P (MAP) Pulse Ox O2 Delivery O2 Flow Rate FiO2


 


9/9/20 11:30  55 26     90


 


9/9/20 11:15    130/63    


 


9/9/20 11:00     90   


 


9/9/20 08:00 97.8       


 


9/9/20 08:00      Mechanical Ventilator  





      Mechanical Ventilator  











Laboratory Tests








Test


  9/9/20


02:00 9/9/20


04:00 9/9/20


04:30 9/9/20


07:29


 


Random Amikacin Level 15.8 MG/L     


 


Stool Occult Blood  Pending    


 


White Blood Count


  


  


  22.8 K/UL


(4.8-10.8)  *H 


 


 


Red Blood Count


  


  


  2.71 M/UL


(4.20-5.40)  L 


 


 


Hemoglobin


  


  


  9.0 G/DL


(12.0-16.0)  L 


 


 


Hematocrit


  


  


  26.2 %


(37.0-47.0)  L 


 


 


Mean Corpuscular Volume   97 FL (80-99)   


 


Mean Corpuscular Hemoglobin


  


  


  33.0 PG


(27.0-31.0)  H 


 


 


Mean Corpuscular Hemoglobin


Concent 


  


  34.2 G/DL


(32.0-36.0) 


 


 


Red Cell Distribution Width


  


  


  15.1 %


(11.6-14.8)  H 


 


 


Platelet Count


  


  


  127 K/UL


(150-450)  L 


 


 


Mean Platelet Volume


  


  


  9.0 FL


(6.5-10.1) 


 


 


Neutrophils (%) (Auto)


  


  


  % (45.0-75.0)


  


 


 


Lymphocytes (%) (Auto)


  


  


  % (20.0-45.0)


  


 


 


Monocytes (%) (Auto)    % (1.0-10.0)   


 


Eosinophils (%) (Auto)    % (0.0-3.0)   


 


Basophils (%) (Auto)    % (0.0-2.0)   


 


Differential Total Cells


Counted 


  


  100  


  


 


 


Neutrophils % (Manual)   89 % (45-75)  H 


 


Lymphocytes % (Manual)   7 % (20-45)  L 


 


Monocytes % (Manual)   4 % (1-10)   


 


Eosinophils % (Manual)   0 % (0-3)   


 


Basophils % (Manual)   0 % (0-2)   


 


Band Neutrophils   0 % (0-8)   


 


Platelet Estimate   Decreased  L 


 


Platelet Morphology   Normal   


 


Anisocytosis   1+   


 


Sodium Level


  


  


  151 MMOL/L


(136-145)  H 


 


 


Potassium Level


  


  


  3.8 MMOL/L


(3.5-5.1) 


 


 


Chloride Level


  


  


  119 MMOL/L


()  H 


 


 


Carbon Dioxide Level


  


  


  26 MMOL/L


(21-32) 


 


 


Anion Gap


  


  


  6 mmol/L


(5-15) 


 


 


Blood Urea Nitrogen


  


  


  44 mg/dL


(7-18)  H 


 


 


Creatinine


  


  


  1.1 MG/DL


(0.55-1.30) 


 


 


Estimat Glomerular Filtration


Rate 


  


  51.0 mL/min


(>60) 


 


 


Glucose Level


  


  


  177 MG/DL


()  H 


 


 


Calcium Level


  


  


  7.5 MG/DL


(8.5-10.1)  L 


 


 


Phosphorus Level


  


  


  3.0 MG/DL


(2.5-4.9) 


 


 


Magnesium Level


  


  


  2.1 MG/DL


(1.8-2.4) 


 


 


Total Bilirubin


  


  


  0.4 MG/DL


(0.2-1.0) 


 


 


Aspartate Amino Transf


(AST/SGOT) 


  


  54 U/L (15-37)


H 


 


 


Alanine Aminotransferase


(ALT/SGPT) 


  


  169 U/L


(12-78)  H 


 


 


Alkaline Phosphatase


  


  


  60 U/L


() 


 


 


Total Protein


  


  


  4.6 G/DL


(6.4-8.2)  L 


 


 


Albumin


  


  


  1.2 G/DL


(3.4-5.0)  L 


 


 


Globulin   3.4 g/dL   


 


Albumin/Globulin Ratio


  


  


  0.4 (1.0-2.7)


L 


 


 


Arterial Blood pH


  


  


  


  7.479


(7.350-7.450)


 


Arterial Blood Partial


Pressure CO2 


  


  


  30.4 mmHg


(35.0-45.0)  L


 


Arterial Blood Partial


Pressure O2 


  


  


  80.1 mmHg


(75.0-100.0)


 


Arterial Blood HCO3


  


  


  


  22.1 mmol/L


(22.0-26.0)


 


Arterial Blood Oxygen


Saturation 


  


  


  94.8 %


()  L


 


Arterial Blood Base Excess    -0.9 (-2-2)  


 


Cole Test    Positive  











Microbiology








 Date/Time


Source Procedure


Growth Status


 


 


 9/6/20 13:35


Blood Blood Culture - Preliminary


NO GROWTH AFTER 48 HOURS Resulted


 


 9/6/20 13:35


Blood Blood Culture - Preliminary


NO GROWTH AFTER 48 HOURS Resulted





 9/7/20 14:00


Sputum Gram Stain


Pending Resulted


 


 9/7/20 14:00


Sputum Sputum Culture - Preliminary


NO GROWTH AFTER 24 HOURS Resulted


 


 9/7/20 14:00


Indwelling Cath Urine Culture - Preliminary


NO GROWTH AFTER 24 HOURS Resulted

















Intake and Output  


 


 9/8/20 9/9/20





 19:00 07:00


 


Intake Total 1189 ml 735 ml


 


Output Total 550 ml 610 ml


 


Balance 639 ml 125 ml


 


  


 


Free Water 120 ml 80 ml


 


IV Total 669 ml 55 ml


 


Tube Feeding 400 ml 600 ml


 


Output Urine Total 550 ml 610 ml


 


# Bowel Movements  2








Objective


General Appearance:  WD/WN, no apparent distress, alert


EENT:  PERRL/EOMI, normal ENT inspection


Neck:  non-tender, normal alignment, supple, normal inspection


Cardiovascular:  normal peripheral pulses, normal rate, regular rhythm, no 

gallop/murmur, no JVD


Respiratory/Chest:  Mech vent; decreased breath sounds, crackles/rales, rhonchi 

- bilaterally, expiratory wheezing


Abdomen:  normal bowel sounds, non tender, soft, no organomegaly, no mass


Extremities:  normal range of motion


Neurologic:  CNs II-XII grossly normal


Skin:  normal pigmentation, warm/dry





Assessment/Plan


Problem List:  


(1) HCAP (healthcare-associated pneumonia)


Assessment & Plan:  Strep Group G.  Continue daptomycin per ID=Dr Gomes.  

Pulmonary/Critical care=DR Cherry.  COVID NEG





(2) Sepsis


Assessment & Plan:  Staph haemolyticus.  Continue amikacin and meropenem per ID=

Dr Gomes





(3) Down's syndrome


(4) Dysphagia


Assessment & Plan:  S/P PEG





(5) Seizure disorder


Assessment & Plan:  Continue keppra and depakote





(6) Hypothyroidism


Assessment & Plan:  Continue synthroid





(7) Acute respiratory failure


Assessment & Plan:  Pulmonary = Dr Cherry; [facility]














Sanya Schultz MD Sep 9, 2020 11:43

## 2020-09-09 NOTE — CARDIOLOGY REPORT
--------------- APPROVED REPORT --------------





EKG Measurement

Heart [id number]

[id number]

[id number]

[id number]

[id number]



<Conclusion>

Normal sinus rhythm

Left axis deviation

Right bundle branch block

Abnormal ECG

## 2020-09-09 NOTE — DIAGNOSTIC IMAGING REPORT
Indication: Dyspnea

 

Technique: One view of the chest

 

Comparison: 9/8/2020

 

Findings: Stable satisfactory position of endotracheal tube. Bilateral interstitial

and airspace infiltrates versus edema are unchanged. Left sided PICC remains.

 

Impression:  Unchanged, over one day, findings as above.

## 2020-09-09 NOTE — NUR
NURSE NOTES:

Seen by Dr. Cherry and assessed patient. Ordered new vent setting. Will follow up with RT.

## 2020-09-09 NOTE — NUR
NURSE NOTES:

Received report from LAYTON Faulkner. Patient asleep and responsive when shaking. ETT 7.5/22cm at 
lip line with vent setting AC 26, , Peep 12 and FiO2 90%. O2 Sat 98% on the monitor. 
Gtube intact, clean and running Jevity 1.2 @50ml/hr. Valle intact and draining with yellow 
color urine. Left upper arm PICC intact, clean and running TKO. Kept dry, clean, comfortable 
and HOB>30. Call light placed in easy reach. Bed in lowest position and locked. Will 
continue plan of care.

## 2020-09-09 NOTE — INFECTIOUS DISEASES PROG NOTE
Assessment/Plan








ASSESSMENT:


sp code blue 8/26





Septic Shock; SP


Fever, recurrent


Leukocytosis; recurrent; worsening


   -9/6 Bcx NTD


            ucx NTD


             sp cx NTD


  -8/26 u/a no pyuria





Pneumonia.- COVID 19 neg x3


   Acute hypoxic resp failure on VM> NRB 15l 100%; hypoxic on ABG> now VDRF 8/26

- Fio2 80% >100% 8/28> 60% 9/1 >80% 9/2   


       -9/5 CXR:Small bilateral pleural effusions with minor edema.  Stable 

edema with  mild worsening in the degree of pleural effusion on the right.


         -9/1 sp cx Neg


         8/31 CXR: Extensive bilateral interstitial and airspace disease 

appears similar to the prior exam. Moderate to large bilateral pleural 

effusions appear unchanged.


   8/28 CXR: Increasing left upper lobe dense consolidation and likely 

increasing bilateral pleural fluid. Persistent diffuse dense consolidation 

elsewhere


            -8/26 sp cx normal resp lilliam


             -8/25 CXR: Increased atelectasis of the right lung, since prior 

exam of 3 days earlier. New or increased right pleural effusion. Increased left 

basilar consolidation and/or pleural fluid 


          -COVID Rapid PCR neg 8/23, 8/23, 8/26


          -8/22 spc x Group G strep


          -8/22 CXR:   Reduced lung volumes.  Patchy bilateral predominantly 

interstitial  pulmonary opacities.  Could be from edema and/or pneumonia.  

There is a broader differential.


 


        -legionella ag urine, blasto ab, Histo ab, HIV ab screen, JOY, ANCA neg





Persistent, high grade bacteremia-


     -8/22 Bcx 4/4 sets S. haemolyticus; 8/23 Bcx 3/4 S/ epi; 8/27 Bcx 1.4 S. 

warnerri; 8/29 Bcx Neg


     -2d echo: no vegetaions seen





          ua/ wbc 10-15, nit neg, leuk +1; ucx Neg





DAVID; 


 -supratherapeutic vanco levels





-Seizure disorder.


- Hypothyroidism.


- Down syndrome.





History of PEG tube placement.


[facility]





PLAN:





-f/u repeat blood Cx sputum and Urine Cx





Cont Meropenem#14/14 (abx d #18) given resp decompensation


Empiric Amikacin #2 pending repeat cultures





          9/4/20 SP Daptomycin #11


          9/2 SP MIcafungin #7, Linezolid #5


   8/28 SP Azithromycin #7/7


   8/26 SP Ceftriaxone #2


   8/25 SP IV Vancomycin #4, Zosyn #4


   8/22 SP Cefepime x1, Flagyl x1





- Monitor CBC, BMP.


-f/u Cocci ab, CrAg


.f/u  Repeat cx


- COVID neg x3


- Monitor chest x-ray.


- Monitor the patient's clinical course and labs.  Based on those, we will do 

further recommendation.


-f/u Bcx from picc line, cdiff if diarrhea





Thank you, Dr. Cherry, for allowing me to participate in the care of


this patient.  I will follow the patient with you at this


hospitalization.








Discussed with RN





Subjective


Allergies:  


Coded Allergies:  


     No Known Allergies (Unverified , 1/8/19)


afebrile >24hrs


off pressors 


Fio2 90%





Objective





Last 24 Hour Vital Signs








  Date Time  Temp Pulse Resp B/P (MAP) Pulse Ox O2 Delivery O2 Flow Rate FiO2


 


9/9/20 12:00 98.0 55 26 105/56 (72) 94   


 


9/9/20 12:00      Mechanical Ventilator  





      Mechanical Ventilator  


 


9/9/20 12:00  55      


 


9/9/20 12:00        90


 


9/9/20 11:30  55 26     90


 


9/9/20 11:15    130/63    


 


9/9/20 11:00  55 25 114/57 (76) 90   


 


9/9/20 10:00  51 26 130/63 (85) 90   


 


9/9/20 09:16  56 26     90


 


9/9/20 09:00        90


 


9/9/20 09:00  56 24 120/63 (82) 98   


 


9/9/20 08:00 97.8 60 24 120/63 (82) 94   


 


9/9/20 08:00        90


 


9/9/20 08:00      Mechanical Ventilator  





      Mechanical Ventilator  


 


9/9/20 08:00  51      


 


9/9/20 07:45    136/67    


 


9/9/20 07:15  59 26     90


 


9/9/20 07:00  58 24 136/67 (90) 98   


 


9/9/20 06:00  55 26 110/56 (74) 98   


 


9/9/20 05:00  59 27 109/50 (69) 97   


 


9/9/20 04:00        100


 


9/9/20 04:00      Mechanical Ventilator  





      Mechanical Ventilator  


 


9/9/20 04:00 98.8 66 26 95/57 (70) 98   


 


9/9/20 04:00  61      


 


9/9/20 03:00  67 27 107/63 (78) 97   


 


9/9/20 02:41  65 30     100


 


9/9/20 02:00  56 26 113/65 (81) 98   


 


9/9/20 01:30  59 26 120/69 (86) 96   


 


9/9/20 01:00  61 25 104/53 (70) 96   


 


9/9/20 00:30  58 26 117/54 (75) 94   


 


9/9/20 00:00      Mechanical Ventilator  





      Mechanical Ventilator  


 


9/9/20 00:00 98.8 62 26 119/62 (81) 96   


 


9/8/20 23:00  57 26 106/76 (86) 97   


 


9/8/20 22:39  58 26     100


 


9/8/20 22:00  62 25 92/65 (74) 91   


 


9/8/20 21:00  61 26 96/49 (65) 94   


 


9/8/20 20:00      Mechanical Ventilator  





      Mechanical Ventilator  


 


9/8/20 20:00        100


 


9/8/20 20:00  60      


 


9/8/20 20:00 99.0 54 26 109/52 (71) 93   


 


9/8/20 19:06  58 26     100


 


9/8/20 19:00  62 26 104/52 (69) 94   


 


9/8/20 18:00  84 23 138/99 (112) 93   


 


9/8/20 17:03  69 26 108/52 (70) 93   


 


9/8/20 16:00      Mechanical Ventilator  





      Mechanical Ventilator  


 


9/8/20 16:00  63      


 


9/8/20 16:00        100


 


9/8/20 16:00 99.3 70 28 109/52 (71) 93   


 


9/8/20 16:00  70 28 109/52 (71) 93   


 


9/8/20 16:00  62      


 


9/8/20 15:28  62 26     100


 


9/8/20 15:00  69 26 105/57 (73) 91   


 


9/8/20 14:00  67 26 106/50 (68) 91   


 


9/8/20 13:00  57 26 110/61 (77) 91   








Height (Feet):  5


Height (Inches):  3.00


Weight (Pounds):  172


HEENT:  No pale conjunctivae.  No icterus. ETT in place


NECK:  No lymphadenopathy.


CHEST:  Coarse breathing sounds.


HEART:  S1 and S2.


ABDOMEN:  Soft.  PEG tube in place.


EXTREMITIES:  No cyanosis at this time .


SKIN: no rash





Microbiology








 Date/Time


Source Procedure


Growth Status


 


 


 9/6/20 13:35


Blood Blood Culture - Preliminary


NO GROWTH AFTER 48 HOURS Resulted


 


 9/6/20 13:35


Blood Blood Culture - Preliminary


NO GROWTH AFTER 48 HOURS Resulted





 9/7/20 14:00


Sputum Gram Stain


Pending Resulted


 


 9/7/20 14:00


Sputum Sputum Culture - Preliminary


NO GROWTH AFTER 24 HOURS Resulted


 


 9/7/20 14:00


Indwelling Cath Urine Culture - Preliminary


NO GROWTH AFTER 24 HOURS Resulted











Laboratory Tests








Test


  9/9/20


02:00 9/9/20


04:00 9/9/20


04:30 9/9/20


07:29


 


Random Amikacin Level 15.8 MG/L     


 


Stool Occult Blood


  


  Negative


(NEGATIVE) 


  


 


 


White Blood Count


  


  


  22.8 K/UL


(4.8-10.8)  *H 


 


 


Red Blood Count


  


  


  2.71 M/UL


(4.20-5.40)  L 


 


 


Hemoglobin


  


  


  9.0 G/DL


(12.0-16.0)  L 


 


 


Hematocrit


  


  


  26.2 %


(37.0-47.0)  L 


 


 


Mean Corpuscular Volume   97 FL (80-99)   


 


Mean Corpuscular Hemoglobin


  


  


  33.0 PG


(27.0-31.0)  H 


 


 


Mean Corpuscular Hemoglobin


Concent 


  


  34.2 G/DL


(32.0-36.0) 


 


 


Red Cell Distribution Width


  


  


  15.1 %


(11.6-14.8)  H 


 


 


Platelet Count


  


  


  127 K/UL


(150-450)  L 


 


 


Mean Platelet Volume


  


  


  9.0 FL


(6.5-10.1) 


 


 


Neutrophils (%) (Auto)


  


  


  % (45.0-75.0)


  


 


 


Lymphocytes (%) (Auto)


  


  


  % (20.0-45.0)


  


 


 


Monocytes (%) (Auto)    % (1.0-10.0)   


 


Eosinophils (%) (Auto)    % (0.0-3.0)   


 


Basophils (%) (Auto)    % (0.0-2.0)   


 


Differential Total Cells


Counted 


  


  100  


  


 


 


Neutrophils % (Manual)   89 % (45-75)  H 


 


Lymphocytes % (Manual)   7 % (20-45)  L 


 


Monocytes % (Manual)   4 % (1-10)   


 


Eosinophils % (Manual)   0 % (0-3)   


 


Basophils % (Manual)   0 % (0-2)   


 


Band Neutrophils   0 % (0-8)   


 


Platelet Estimate   Decreased  L 


 


Platelet Morphology   Normal   


 


Anisocytosis   1+   


 


Sodium Level


  


  


  151 MMOL/L


(136-145)  H 


 


 


Potassium Level


  


  


  3.8 MMOL/L


(3.5-5.1) 


 


 


Chloride Level


  


  


  119 MMOL/L


()  H 


 


 


Carbon Dioxide Level


  


  


  26 MMOL/L


(21-32) 


 


 


Anion Gap


  


  


  6 mmol/L


(5-15) 


 


 


Blood Urea Nitrogen


  


  


  44 mg/dL


(7-18)  H 


 


 


Creatinine


  


  


  1.1 MG/DL


(0.55-1.30) 


 


 


Estimat Glomerular Filtration


Rate 


  


  51.0 mL/min


(>60) 


 


 


Glucose Level


  


  


  177 MG/DL


()  H 


 


 


Calcium Level


  


  


  7.5 MG/DL


(8.5-10.1)  L 


 


 


Phosphorus Level


  


  


  3.0 MG/DL


(2.5-4.9) 


 


 


Magnesium Level


  


  


  2.1 MG/DL


(1.8-2.4) 


 


 


Total Bilirubin


  


  


  0.4 MG/DL


(0.2-1.0) 


 


 


Aspartate Amino Transf


(AST/SGOT) 


  


  54 U/L (15-37)


H 


 


 


Alanine Aminotransferase


(ALT/SGPT) 


  


  169 U/L


(12-78)  H 


 


 


Alkaline Phosphatase


  


  


  60 U/L


() 


 


 


Total Protein


  


  


  4.6 G/DL


(6.4-8.2)  L 


 


 


Albumin


  


  


  1.2 G/DL


(3.4-5.0)  L 


 


 


Globulin   3.4 g/dL   


 


Albumin/Globulin Ratio


  


  


  0.4 (1.0-2.7)


L 


 


 


Arterial Blood pH


  


  


  


  7.479


(7.350-7.450)


 


Arterial Blood Partial


Pressure CO2 


  


  


  30.4 mmHg


(35.0-45.0)  L


 


Arterial Blood Partial


Pressure O2 


  


  


  80.1 mmHg


(75.0-100.0)


 


Arterial Blood HCO3


  


  


  


  22.1 mmol/L


(22.0-26.0)


 


Arterial Blood Oxygen


Saturation 


  


  


  94.8 %


()  L


 


Arterial Blood Base Excess    -0.9 (-2-2)  


 


Cole Test    Positive  











Current Medications








 Medications


  (Trade)  Dose


 Ordered  Sig/Didi


 Route


 PRN Reason  Start Time


 Stop Time Status Last Admin


Dose Admin


 


 Acetaminophen


  (Tylenol)  650 mg  Q4H  PRN


 GT


 FEVER  8/26/20 11:45


 9/25/20 11:44  9/7/20 03:17


 


 


 Amikacin Protocol


  (Amikacin


 pharmacy to dose)  1 ea  DAILY  PRN


 MISC


 Per rx protocol  9/8/20 12:00


 10/8/20 11:59   


 


 


 Amikacin Sulfate


 1000 mg/Sodium


 Chloride  114 ml @ 


 114 mls/hr  Q36H


 IV


   9/8/20 14:00


 9/15/20 13:59  9/8/20 13:56


 


 


 Chlorhexidine


 Gluconate


  (Beatrice-Hex 2%)  1 applic  DAILY@2000


 TOPIC


   8/31/20 20:00


 11/29/20 19:59  9/8/20 20:03


 


 


 Clotrimazole


  (Lotrimin)  1 applic  Q12HR


 TOPIC


   8/30/20 13:00


 11/28/20 12:59  9/9/20 08:57


 


 


 Dextrose


  (Dextrose 50%)  25 ml  Q30M  PRN


 IV


 Hypoglycemia  8/26/20 11:30


 11/20/20 11:29   


 


 


 Dextrose


  (Dextrose 50%)  50 ml  Q30M  PRN


 IV


 Hypoglycemia  8/26/20 11:30


 11/20/20 11:29   


 


 


 Dopamine HCl/


 Dextrose  250 ml @ 0


 mls/hr  Q24H


 IV


   9/4/20 11:15


 12/3/20 11:14  9/6/20 19:47


 


 


 Hydrocortisone


  (Solu-CORTEF)  100 mg  EVERY 8  HOURS


 IV


   8/30/20 14:00


 11/28/20 13:59  9/9/20 05:32


 


 


 Levothyroxine


 Sodium


  (Synthroid)  75 mcg  DAILY


 GT


   8/27/20 09:00


 9/22/20 08:59  9/9/20 08:57


 


 


 Lorazepam


  (Ativan 2mg/ml


 1ml)  2 mg  Q2H  PRN


 IV


 For Anxiety  9/4/20 17:30


 9/11/20 17:29  9/4/20 17:37


 


 


 Meropenem 1 gm/


 Sodium Chloride  55 ml @ 


 110 mls/hr  Q8H


 IVPB


   9/6/20 12:00


 9/11/20 11:59  9/9/20 11:13


 


 


 Midodrine


  (Pro-Amatine)  10 mg  Q8HR


 GT


   8/28/20 14:00


 11/26/20 13:59  9/9/20 05:32


 


 


 Norepinephrine


 Bitartrate 16 mg/


 Dextrose  500 ml @ 0


 mls/hr  Q24H


 IV


   8/31/20 07:45


 9/29/20 12:59  9/4/20 08:43


 


 


 Ondansetron HCl


  (Zofran)  4 mg  Q6H  PRN


 IVP


 Nausea & Vomiting  8/26/20 12:00


 9/21/20 11:59   


 


 


 Pantoprazole


  (Protonix)  40 mg  DAILY


 IV


   8/27/20 09:00


 9/22/20 08:59  9/9/20 08:57


 


 


 Phenylephrine HCl


 50 mg/Dextrose  250 ml @ 0


 mls/hr  Q24H  PRN


 IV


 For hypotension  8/29/20 17:45


 9/28/20 17:44  8/31/20 01:49


 


 


 Polyethylene


 Glycol


  (Miralax)  17 gm  DAILYPRN  PRN


 GT


 Constipation  8/26/20 12:00


 9/21/20 11:59   


 


 


 Valproic Acid


  (Depakene)  500 mg  EVERY 8  HOURS


 GT


   8/26/20 14:00


 9/21/20 21:59  9/9/20 05:33


 

















Rema Gomes M.D. Sep 9, 2020 12:46

## 2020-09-09 NOTE — NUR
RD ASSESSMENT & RECOMMENDATIONS

SEE CARE ACTIVITY FOR COMPLETE ASSESSMENT



DAILY ESTIMATED NEEDS:

Needs based on CRITICAL CARE, 50kg  

22-27  kcals/kg 

8942-4490  total kcals

1.25-2  g protein/kg

  g total protein 

25-30  mL/kg

5189-4040  total fluid mLs



NUTRITION DIAGNOSIS:

Swallowing difficulty r/t dysphagia as evidenced by pt w/ Downs Syndrome,

PEG dep for all nutritional needs, now intubated, pressor support, ICU

status.



CURRENT TF: Jevity 1.2 @50ml/hr x 22 hrs 



ENTERAL NUTRITION RECOMMENDATIONS:

VITAL AF 1.2 @ 50ml/hr x22 hrs   to provide 1100ml, 1320 kcal, 83g pro, 892ml free H2O  



- Rec VITAL AF 1.2 for critical care, high pro needs, possible improved

tolerance.

- Initiate @ 20ml/hr x 6hrs, advance as tolerated to goal w/ hemodynamic

stability.

- Hold TF 1 hr before and after synthroid meds.

- Flush per MD, HOB over 30 degrees.

-------------

***WITHOUT HEMODYNAMIC STABILITY, rec trophic feeds of Vital AF 1.2 @

10ml/hr***





ADDITIONAL RECOMMENDATIONS:

1) Ht of 61 inches per SNF; recalibrate bed scale for accurate CBW  

2) Monitor BG closely w/ Solucortef-> rec jacklyn for BG control  

3) Monitor hemodynamic stability: on pressors x 2, titrating down 

        -> rec trophic feeds while not hemodynamically stable 

4) Wound healing: Continue Vit C 250mg QD + Marcio BID 

5) Monitor lytes, replete as needed 

6) Rec prokinetics w/ continued residuals-> minimal now, 6ml, 8ml.

## 2020-09-09 NOTE — CARDIOLOGY REPORT
--------------- APPROVED REPORT --------------





EXAM: Two-dimensional and M-mode echocardiogram with Doppler and color Doppler.



INDICATION

Vegitation



M-Mode DIMENSIONS 

IVSd0.9 (0.7-1.1cm)Left Atrium (MM)3.4 (1.6-4.0cm)

LVDd5.4 (3.5-5.6cm)Aortic Root3.5 (2.0-3.7cm)

PWd0.7 (0.7-1.1cm)Aortic Cusp Exc.1.8 (1.5-2.0cm)

IVSs1.8 cmEPSS0.7 (>1.0cm)

LVDs2.9 (2.5-4.0cm)

PWs1.5 cm



<Conclusion>

Technically difficult study due to poor acoustic windows.

Normal left ventricular chamber size, systolic function and wall motion to extent visualized.

Left ventricular ejection fraction grossly estimated to be  65 %.

Mild left ventricular hypertrophy.

Anterior Echo-free space, may be due to pericardial fat or effusion.

All other cardiac chamber sizes are within normal limits. 

Focal aortic valve sclerosis with adequate cusp excursion.

Thickened mitral valve leaflets with normal excursion.

Mitral annulus and aortic root calcification.

Pulmonic valve not well visualized.

Normal tricuspid valve structure.  

IVC view is not obtainable due to GI-tube.

No discrete vegetations seen, however SBE may not be excluded by transthoracic 2-D echo.



A  color flow and spectral Doppler study was performed and revealed:

No aortic regurgitation.

Trace mitral regurgitation.

Mitral diastolic velocities suggest mild left ventricular diastolic dysfunction (Grade I).

Trace tricuspid regurgitation.

Tricuspid systolic velocities suggests peak right ventricular systolic pressure of 12 mmHg.

## 2020-09-09 NOTE — NUR
NURSE NOTES:

Received report from LAYTON Plata. Patient in bed sleeping comfortable, no s/s of acute distress 
noted. No fever. Orally intubated  Fi02 90%O2 Sat 94% on the monitor. HOB elevated.G-tube 
intact, clean and running Vital AF 1.2 at 60ml/hr. Valle intact and draining with yellow 
color urine. Left upper arm PICC intact, clean and running TKO. Kept dry, clean, comfortable 
and HOB>30. Call light placed in easy reach. Bed in lowest position and locked. Seizure 
precaution and contact isolation maintained and observed.SCD on bilateral legs. Will 
continue plan of care.

## 2020-09-09 NOTE — PULMONOLGY CRITICAL CARE NOTE
Critical Care - Asmt/Plan


Problems:  


(1) Acute respiratory failure


(2) Bacteremia


(3) Pneumonia


(4) Sepsis


(5) HCAP (healthcare-associated pneumonia)


(6) Seizure disorder


(7) Down's syndrome


Respiratory:  monitor respiratory rate, adjust FIO2


Cardiac:  continue pressors, stop pressors, continue to monitor HR/BP


Renal:  keep IV fluid, check electrolytes


Infectious Disease:  check cultures


Gastrointestinal:  continue feedings/current rate


Endocrine:  continue sliding scale insulin


Hematologic:  transfuse if hgb<8.5


Neurologic:  PRN Ativan, PRN Morphine, keep patient comfortable


Prophylaxis:  Protonix, Heparin


Notes Reviewed:  internist, renal


Discussed with:  nurses, consultants, 





Critical Care - Objective





Last 24 Hour Vital Signs








  Date Time  Temp Pulse Resp B/P (MAP) Pulse Ox O2 Delivery O2 Flow Rate FiO2


 


9/9/20 19:00  54 26 101/51 (68) 93   


 


9/9/20 18:00  54 26 109/50 (69) 91   


 


9/9/20 17:00  57 26 97/47 (64) 94   


 


9/9/20 16:00  65      


 


9/9/20 16:00 98.8 59 26 121/58 (79) 94   


 


9/9/20 16:00        90


 


9/9/20 16:00      Mechanical Ventilator  





      Mechanical Ventilator  


 


9/9/20 15:25  67 26     90


 


9/9/20 15:00  61 25 128/60 (82) 94   


 


9/9/20 14:00  58 25 107/67 (80) 93   


 


9/9/20 13:00  58 26 108/58 (75) 92   


 


9/9/20 12:00 98.0 55 26 105/56 (72) 94   


 


9/9/20 12:00      Mechanical Ventilator  





      Mechanical Ventilator  


 


9/9/20 12:00  55      


 


9/9/20 12:00        90


 


9/9/20 11:30  55 26     90


 


9/9/20 11:15    130/63    


 


9/9/20 11:00  55 25 114/57 (76) 90   


 


9/9/20 10:00  51 26 130/63 (85) 90   


 


9/9/20 09:16  56 26     90


 


9/9/20 09:00        90


 


9/9/20 09:00  56 24 120/63 (82) 98   


 


9/9/20 08:00 97.8 60 24 120/63 (82) 94   


 


9/9/20 08:00        90


 


9/9/20 08:00      Mechanical Ventilator  





      Mechanical Ventilator  


 


9/9/20 08:00  51      


 


9/9/20 07:45    136/67    


 


9/9/20 07:15  59 26     90


 


9/9/20 07:00  58 24 136/67 (90) 98   


 


9/9/20 06:00  55 26 110/56 (74) 98   


 


9/9/20 05:00  59 27 109/50 (69) 97   


 


9/9/20 04:00        100


 


9/9/20 04:00      Mechanical Ventilator  





      Mechanical Ventilator  


 


9/9/20 04:00 98.8 66 26 95/57 (70) 98   


 


9/9/20 04:00  61      


 


9/9/20 03:00  67 27 107/63 (78) 97   


 


9/9/20 02:41  65 30     100


 


9/9/20 02:00  56 26 113/65 (81) 98   


 


9/9/20 01:30  59 26 120/69 (86) 96   


 


9/9/20 01:00  61 25 104/53 (70) 96   


 


9/9/20 00:30  58 26 117/54 (75) 94   


 


9/9/20 00:00      Mechanical Ventilator  





      Mechanical Ventilator  


 


9/9/20 00:00 98.8 62 26 119/62 (81) 96   


 


9/8/20 23:00  57 26 106/76 (86) 97   


 


9/8/20 22:39  58 26     100


 


9/8/20 22:00  62 25 92/65 (74) 91   


 


9/8/20 21:00  61 26 96/49 (65) 94   


 


9/8/20 20:00      Mechanical Ventilator  





      Mechanical Ventilator  


 


9/8/20 20:00        100


 


9/8/20 20:00  60      


 


9/8/20 20:00 99.0 54 26 109/52 (71) 93   








Status:  sedated


Condition:  critical


HEENT:  atraumatic, normocephalic


Neck:  full ROM


Heart:  HR/BP stable


Abdomen:  non-tender


Extremities:  no C/C/E


Decubiti:  stage


Micro:





Microbiology








 Date/Time


Source Procedure


Growth Status


 


 


 9/7/20 14:00


Sputum Gram Stain - Final Resulted


 


 9/7/20 14:00


Sputum Sputum Culture - Preliminary


NO GROWTH AFTER 24 HOURS Resulted


 


 9/7/20 14:00


Indwelling Cath Urine Culture - Preliminary


NO GROWTH AFTER 24 HOURS Resulted








Accucheck:  131





Critical Care - Subjective


ROS Limited/Unobtainable:  Yes


FI02:  90


Vent Support Breath Rate:  26


Vent Support Mode:  AC


Vent Tidal Volume:  500


Sputum Amount:  Small


PEEP:  10.0


PIP:  55


Tube Feeding Amount:  60


I&O:











Intake and Output  


 


 9/8/20 9/9/20





 19:00 07:00


 


Intake Total 1189 ml 735 ml


 


Output Total 550 ml 610 ml


 


Balance 639 ml 125 ml


 


  


 


Free Water 120 ml 80 ml


 


IV Total 669 ml 55 ml


 


Tube Feeding 400 ml 600 ml


 


Output Urine Total 550 ml 610 ml


 


# Bowel Movements  2








ET-Tube:  7.5


ET Position:  22


Labs:





Laboratory Tests








Test


  9/9/20


02:00 9/9/20


04:00 9/9/20


04:30 9/9/20


07:29


 


Random Amikacin Level 15.8 MG/L     


 


Stool Occult Blood


  


  Negative


(NEGATIVE) 


  


 


 


White Blood Count


  


  


  22.8 K/UL


(4.8-10.8)  *H 


 


 


Red Blood Count


  


  


  2.71 M/UL


(4.20-5.40)  L 


 


 


Hemoglobin


  


  


  9.0 G/DL


(12.0-16.0)  L 


 


 


Hematocrit


  


  


  26.2 %


(37.0-47.0)  L 


 


 


Mean Corpuscular Volume   97 FL (80-99)   


 


Mean Corpuscular Hemoglobin


  


  


  33.0 PG


(27.0-31.0)  H 


 


 


Mean Corpuscular Hemoglobin


Concent 


  


  34.2 G/DL


(32.0-36.0) 


 


 


Red Cell Distribution Width


  


  


  15.1 %


(11.6-14.8)  H 


 


 


Platelet Count


  


  


  127 K/UL


(150-450)  L 


 


 


Mean Platelet Volume


  


  


  9.0 FL


(6.5-10.1) 


 


 


Neutrophils (%) (Auto)


  


  


  % (45.0-75.0)


  


 


 


Lymphocytes (%) (Auto)


  


  


  % (20.0-45.0)


  


 


 


Monocytes (%) (Auto)    % (1.0-10.0)   


 


Eosinophils (%) (Auto)    % (0.0-3.0)   


 


Basophils (%) (Auto)    % (0.0-2.0)   


 


Differential Total Cells


Counted 


  


  100  


  


 


 


Neutrophils % (Manual)   89 % (45-75)  H 


 


Lymphocytes % (Manual)   7 % (20-45)  L 


 


Monocytes % (Manual)   4 % (1-10)   


 


Eosinophils % (Manual)   0 % (0-3)   


 


Basophils % (Manual)   0 % (0-2)   


 


Band Neutrophils   0 % (0-8)   


 


Platelet Estimate   Decreased  L 


 


Platelet Morphology   Normal   


 


Anisocytosis   1+   


 


Sodium Level


  


  


  151 MMOL/L


(136-145)  H 


 


 


Potassium Level


  


  


  3.8 MMOL/L


(3.5-5.1) 


 


 


Chloride Level


  


  


  119 MMOL/L


()  H 


 


 


Carbon Dioxide Level


  


  


  26 MMOL/L


(21-32) 


 


 


Anion Gap


  


  


  6 mmol/L


(5-15) 


 


 


Blood Urea Nitrogen


  


  


  44 mg/dL


(7-18)  H 


 


 


Creatinine


  


  


  1.1 MG/DL


(0.55-1.30) 


 


 


Estimat Glomerular Filtration


Rate 


  


  51.0 mL/min


(>60) 


 


 


Glucose Level


  


  


  177 MG/DL


()  H 


 


 


Calcium Level


  


  


  7.5 MG/DL


(8.5-10.1)  L 


 


 


Phosphorus Level


  


  


  3.0 MG/DL


(2.5-4.9) 


 


 


Magnesium Level


  


  


  2.1 MG/DL


(1.8-2.4) 


 


 


Total Bilirubin


  


  


  0.4 MG/DL


(0.2-1.0) 


 


 


Aspartate Amino Transf


(AST/SGOT) 


  


  54 U/L (15-37)


H 


 


 


Alanine Aminotransferase


(ALT/SGPT) 


  


  169 U/L


(12-78)  H 


 


 


Alkaline Phosphatase


  


  


  60 U/L


() 


 


 


Total Protein


  


  


  4.6 G/DL


(6.4-8.2)  L 


 


 


Albumin


  


  


  1.2 G/DL


(3.4-5.0)  L 


 


 


Globulin   3.4 g/dL   


 


Albumin/Globulin Ratio


  


  


  0.4 (1.0-2.7)


L 


 


 


Arterial Blood pH


  


  


  


  7.479


(7.350-7.450)


 


Arterial Blood Partial


Pressure CO2 


  


  


  30.4 mmHg


(35.0-45.0)  L


 


Arterial Blood Partial


Pressure O2 


  


  


  80.1 mmHg


(75.0-100.0)


 


Arterial Blood HCO3


  


  


  


  22.1 mmol/L


(22.0-26.0)


 


Arterial Blood Oxygen


Saturation 


  


  


  94.8 %


()  L


 


Arterial Blood Base Excess    -0.9 (-2-2)  


 


Cole Test    Positive  


 


Test


  9/9/20


13:55 


  


  


 


 


Urine Color Yellow     


 


Urine Appearance Clear     


 


Urine pH 6 (4.5-8.0)     


 


Urine Specific Gravity


  1.015


(1.005-1.035) 


  


  


 


 


Urine Protein


  2+ (NEGATIVE)


H 


  


  


 


 


Urine Glucose (UA)


  Negative


(NEGATIVE) 


  


  


 


 


Urine Ketones


  Negative


(NEGATIVE) 


  


  


 


 


Urine Blood


  5+ (NEGATIVE)


H 


  


  


 


 


Urine Nitrite


  Negative


(NEGATIVE) 


  


  


 


 


Urine Bilirubin


  Negative


(NEGATIVE) 


  


  


 


 


Urine Urobilinogen


  Normal MG/DL


(0.0-1.0) 


  


  


 


 


Urine Leukocyte Esterase


  Negative


(NEGATIVE) 


  


  


 


 


Urine RBC


  15-20 /HPF (0


- 2)  H 


  


  


 


 


Urine WBC


  0-2 /HPF (0 -


2) 


  


  


 


 


Urine Squamous Epithelial


Cells Occasional


/LPF 


  


  


 


 


Urine Bacteria


  Few /HPF


(NONE) 


  


  


 

















Chano Cherry MD Sep 9, 2020 19:33

## 2020-09-09 NOTE — GENERAL PROGRESS NOTE
Assessment/Plan


Assessment/Plan:


1. History of Down syndrome.


2. Dysphagia with G-tube.


3. Seizure disorder.


4. Hypothyroidism.


5. DAVID.


6. Pneumonia.


7. Sepsis.








fu H&H


ppi


GTF


hold GI procedures for now





Subjective


ROS Limited/Unobtainable:  No


Allergies:  


Coded Allergies:  


     No Known Allergies (Unverified , 1/8/19)





Objective





Last 24 Hour Vital Signs








  Date Time  Temp Pulse Resp B/P (MAP) Pulse Ox O2 Delivery O2 Flow Rate FiO2


 


9/9/20 10:00  51 26 130/63 (85) 90   


 


9/9/20 09:00        100


 


9/9/20 09:00  56 24 120/63 (82) 98   


 


9/9/20 08:00 97.8 60 24 120/63 (82) 94   


 


9/9/20 08:00        100


 


9/9/20 08:00      Mechanical Ventilator  





      Mechanical Ventilator  


 


9/9/20 08:00  51      


 


9/9/20 07:45    136/67    


 


9/9/20 07:15  59 26     90


 


9/9/20 07:00  58 24 136/67 (90) 98   


 


9/9/20 06:00  55 26 110/56 (74) 98   


 


9/9/20 05:00  59 27 109/50 (69) 97   


 


9/9/20 04:00        100


 


9/9/20 04:00      Mechanical Ventilator  





      Mechanical Ventilator  


 


9/9/20 04:00 98.8 66 26 95/57 (70) 98   


 


9/9/20 04:00  61      


 


9/9/20 03:00  67 27 107/63 (78) 97   


 


9/9/20 02:41  65 30     100


 


9/9/20 02:00  56 26 113/65 (81) 98   


 


9/9/20 01:30  59 26 120/69 (86) 96   


 


9/9/20 01:00  61 25 104/53 (70) 96   


 


9/9/20 00:30  58 26 117/54 (75) 94   


 


9/9/20 00:00      Mechanical Ventilator  





      Mechanical Ventilator  


 


9/9/20 00:00 98.8 62 26 119/62 (81) 96   


 


9/8/20 23:00  57 26 106/76 (86) 97   


 


9/8/20 22:39  58 26     100


 


9/8/20 22:00  62 25 92/65 (74) 91   


 


9/8/20 21:00  61 26 96/49 (65) 94   


 


9/8/20 20:00      Mechanical Ventilator  





      Mechanical Ventilator  


 


9/8/20 20:00        100


 


9/8/20 20:00  60      


 


9/8/20 20:00 99.0 54 26 109/52 (71) 93   


 


9/8/20 19:06  58 26     100


 


9/8/20 19:00  62 26 104/52 (69) 94   


 


9/8/20 18:00  84 23 138/99 (112) 93   


 


9/8/20 17:03  69 26 108/52 (70) 93   


 


9/8/20 16:00      Mechanical Ventilator  





      Mechanical Ventilator  


 


9/8/20 16:00  63      


 


9/8/20 16:00        100


 


9/8/20 16:00 99.3 70 28 109/52 (71) 93   


 


9/8/20 16:00  70 28 109/52 (71) 93   


 


9/8/20 16:00  62      


 


9/8/20 15:28  62 26     100


 


9/8/20 15:00  69 26 105/57 (73) 91   


 


9/8/20 14:00  67 26 106/50 (68) 91   


 


9/8/20 13:00  57 26 110/61 (77) 91   


 


9/8/20 12:00  109      


 


9/8/20 12:00      Mechanical Ventilator  





      Mechanical Ventilator  


 


9/8/20 12:00 99.0 61 25 119/61 (80) 92   


 


9/8/20 11:26        100


 


9/8/20 11:24  58 26     100


 


9/8/20 11:15    113/56    

















Intake and Output  


 


 9/8/20 9/9/20





 19:00 07:00


 


Intake Total 1189 ml 680 ml


 


Output Total 550 ml 610 ml


 


Balance 639 ml 70 ml


 


  


 


Free Water 120 ml 80 ml


 


IV Total 669 ml 


 


Tube Feeding 400 ml 600 ml


 


Output Urine Total 550 ml 610 ml


 


# Bowel Movements  2








Laboratory Tests


9/9/20 02:00: Random Amikacin Level [Pending]


9/9/20 04:00: Stool Occult Blood [Pending]


9/9/20 04:30: 


White Blood Count 22.8*H, Red Blood Count 2.71L, Hemoglobin 9.0L, Hematocrit 

26.2L, Mean Corpuscular Volume 97, Mean Corpuscular Hemoglobin 33.0H, Mean 

Corpuscular Hemoglobin Concent 34.2, Red Cell Distribution Width 15.1H, 

Platelet Count 127L, Mean Platelet Volume 9.0, Neutrophils (%) (Auto) , 

Lymphocytes (%) (Auto) , Monocytes (%) (Auto) , Eosinophils (%) (Auto) , 

Basophils (%) (Auto) , Differential Total Cells Counted 100, Neutrophils % (

Manual) 89H, Lymphocytes % (Manual) 7L, Monocytes % (Manual) 4, Eosinophils % (

Manual) 0, Basophils % (Manual) 0, Band Neutrophils 0, Platelet Estimate 

DecreasedL, Platelet Morphology Normal, Anisocytosis 1+, Sodium Level 151H, 

Potassium Level 3.8, Chloride Level 119H, Carbon Dioxide Level 26, Anion Gap 6, 

Blood Urea Nitrogen 44H, Creatinine 1.1, Estimat Glomerular Filtration Rate 51.0

, Glucose Level 177H, Calcium Level 7.5L, Phosphorus Level 3.0, Magnesium Level 

2.1, Total Bilirubin 0.4, Aspartate Amino Transf (AST/SGOT) 54H, Alanine 

Aminotransferase (ALT/SGPT) 169H, Alkaline Phosphatase 60, Total Protein 4.6L, 

Albumin 1.2L, Globulin 3.4, Albumin/Globulin Ratio 0.4L


9/9/20 07:29: 


Arterial Blood pH 7.479H, Arterial Blood Partial Pressure CO2 30.4L, Arterial 

Blood Partial Pressure O2 80.1, Arterial Blood HCO3 22.1, Arterial Blood Oxygen 

Saturation 94.8L, Arterial Blood Base Excess -0.9, Cole Test Positive


Height (Feet):  5


Height (Inches):  3.00


Weight (Pounds):  172


General Appearance:  no apparent distress


EENT:  normal ENT inspection


Neck:  supple


Cardiovascular:  normal rate


Respiratory/Chest:  decreased breath sounds


Abdomen:  normal bowel sounds, non tender, soft


Extremities:  non-tender











Ted Nagy MD Sep 9, 2020 11:08

## 2020-09-09 NOTE — NUR
NURSE NOTES:

Vent setting AC 26, , FiO2 90% and Peep 10. O2 sat 93% on the monitor. SB 50s on the 
monitor. No distress noted. Kept dry, clean, comfortable. Will continue plan of care.

## 2020-09-09 NOTE — NUR
NURSE NOTES:

patient in bed suctioned orally. no s/s of acute distress noted. HOB elevated. Gt intact no 
residual. seizure precaution and contact isolation maintained and observed. frequent visual 
checks continued.

## 2020-09-09 NOTE — NEPHROLOGY PROGRESS NOTE
Assessment/Plan


Problem List:  


(1) DAVID (acute kidney injury)


(2) Respiratory failure requiring intubation


(3) Down's syndrome


(4) Seizure disorder


(5) Hypothyroidism


Assessment





Acute renal failure, likely due to hypotension


Acute respiratory distress, hypoxia


Seizure disorder


Hypothyroidism


Down syndrome


Full code











Fluid challenge with IV fluids and albumin


Midodrine for BP above 100 systolic


Check TSH level


Check


Correct level


Monitor renal parameters


Urine studies


Per orders


Plan


September 9: Labs reviewed.  Status quo.  D5W 500 cc IV ordered.  Continue to 

monitor renal parameters.


September 8: Status quo.  Labs reviewed.  Overall condition unchanged.  Patient 

was transfused and hemoglobin higher.  Continue current management.  Patient 

remains full code.


September 7: Status quo.  Overall condition poor.  Very low albumin.  

Edematous.  Hypotensive.  Hemoglobin lower.  Anemia work-up ordered.  I favor 

transfusion 2 units of packed RBCs.  Patient remains full code.  I favor 

supportive care only.  Will discuss.


September 6: Electrolyte abnormalities addressed.  Serum creatinine lower.  

Continue per current management.


September 5: Status unchanged.  Lab reviewed.  Serum potassium 2.7.  IV 

potassium chloride ordered.  Serum creatinine low at 1.6 stable.  Blood 

pressure 90s systolic


September 4: Status quo.  Labs reviewed.  Renal parameters stable.  Serum 

creatinine down to 1.6.  Medication list reviewed.  Continues to be on 

midodrine.  Continue per consultants.


September 3: Status quo.  Labs reviewed.  Electrolytes adjusted.  Serum 

creatinine down to 1.8.  Continue per consultants.


September 2: Status quo.  Labs reviewed.  Phosphorus supplement IV given.  

Serum creatinine 2.  Continue per consultants.


September 1: Requires less pressors.  Albumin bolus given.  1 dose of Lasix IV 

ordered as the patient severely edematous.  Patient serum albumin is very low.  

Continue per consultants.


August 31: Continues to be intubated.  Labs reviewed.  Serum creatinine 1.9 

unchanged.  Blood pressure more stable.  Off 1 of the pressors.  Continue to 

monitor renal parameters.  Continue per consultants.  Patient now on 

hydrocortisone 100 mg every 8 hours.  Will decrease IV fluid.  Normal saline 

down to 50 cc an hour.


August 30: Intubated.  Labs reviewed.  Creatinine 1.9 unchanged.  Continue same 

treatment plan.  Per consultants.  Overall poor prognosis since the patient 

remains on pressors and her pulmonary status is worsening.


August 29: Remains intubated.  Labs reviewed.  Creatinine 1.9.  Blood pressure 

systolic 90s.  Continue per consultants.


August 28: Remains intubated.  Labs reviewed.  Serum creatinine lower to 2.  

Vancomycin level lower.  Remains hypotensive on pressors.  Will increase 

midodrine to 10 mg every 8 hours.  Continue per consultants.  Continue to 

monitor renal parameters.


August 27: Patient now in ICU.  Intubated.  On pressors.  Labs reviewed.  Will 

increase midodrine.  Aim to keep blood pressure over 100 systolic.  Will give 

albumin bolus.  Will check vancomycin level which was elevated when checked 

previously on August 24.  Will monitor renal parameters.  Continue per 

consultants.





Subjective


ROS Limited/Unobtainable:  Yes





Objective


Objective





Last 24 Hour Vital Signs








  Date Time  Temp Pulse Resp B/P (MAP) Pulse Ox O2 Delivery O2 Flow Rate FiO2


 


9/9/20 13:00  58 26 108/58 (75) 92   


 


9/9/20 12:00 98.0 55 26 105/56 (72) 94   


 


9/9/20 12:00      Mechanical Ventilator  





      Mechanical Ventilator  


 


9/9/20 12:00  55      


 


9/9/20 12:00        90


 


9/9/20 11:30  55 26     90


 


9/9/20 11:15    130/63    


 


9/9/20 11:00  55 25 114/57 (76) 90   


 


9/9/20 10:00  51 26 130/63 (85) 90   


 


9/9/20 09:16  56 26     90


 


9/9/20 09:00        90


 


9/9/20 09:00  56 24 120/63 (82) 98   


 


9/9/20 08:00 97.8 60 24 120/63 (82) 94   


 


9/9/20 08:00        90


 


9/9/20 08:00      Mechanical Ventilator  





      Mechanical Ventilator  


 


9/9/20 08:00  51      


 


9/9/20 07:45    136/67    


 


9/9/20 07:15  59 26     90


 


9/9/20 07:00  58 24 136/67 (90) 98   


 


9/9/20 06:00  55 26 110/56 (74) 98   


 


9/9/20 05:00  59 27 109/50 (69) 97   


 


9/9/20 04:00        100


 


9/9/20 04:00      Mechanical Ventilator  





      Mechanical Ventilator  


 


9/9/20 04:00 98.8 66 26 95/57 (70) 98   


 


9/9/20 04:00  61      


 


9/9/20 03:00  67 27 107/63 (78) 97   


 


9/9/20 02:41  65 30     100


 


9/9/20 02:00  56 26 113/65 (81) 98   


 


9/9/20 01:30  59 26 120/69 (86) 96   


 


9/9/20 01:00  61 25 104/53 (70) 96   


 


9/9/20 00:30  58 26 117/54 (75) 94   


 


9/9/20 00:00      Mechanical Ventilator  





      Mechanical Ventilator  


 


9/9/20 00:00 98.8 62 26 119/62 (81) 96   


 


9/8/20 23:00  57 26 106/76 (86) 97   


 


9/8/20 22:39  58 26     100


 


9/8/20 22:00  62 25 92/65 (74) 91   


 


9/8/20 21:00  61 26 96/49 (65) 94   


 


9/8/20 20:00      Mechanical Ventilator  





      Mechanical Ventilator  


 


9/8/20 20:00        100


 


9/8/20 20:00  60      


 


9/8/20 20:00 99.0 54 26 109/52 (71) 93   


 


9/8/20 19:06  58 26     100


 


9/8/20 19:00  62 26 104/52 (69) 94   


 


9/8/20 18:00  84 23 138/99 (112) 93   


 


9/8/20 17:03  69 26 108/52 (70) 93   


 


9/8/20 16:00      Mechanical Ventilator  





      Mechanical Ventilator  


 


9/8/20 16:00  63      


 


9/8/20 16:00        100


 


9/8/20 16:00 99.3 70 28 109/52 (71) 93   


 


9/8/20 16:00  70 28 109/52 (71) 93   


 


9/8/20 16:00  62      


 


9/8/20 15:28  62 26     100

















Intake and Output  


 


 9/8/20 9/9/20





 19:00 07:00


 


Intake Total 1189 ml 735 ml


 


Output Total 550 ml 610 ml


 


Balance 639 ml 125 ml


 


  


 


Free Water 120 ml 80 ml


 


IV Total 669 ml 55 ml


 


Tube Feeding 400 ml 600 ml


 


Output Urine Total 550 ml 610 ml


 


# Bowel Movements  2








Laboratory Tests


9/9/20 02:00: Random Amikacin Level 15.8


9/9/20 04:00: Stool Occult Blood Negative


9/9/20 04:30: 


White Blood Count 22.8*H, Red Blood Count 2.71L, Hemoglobin 9.0L, Hematocrit 

26.2L, Mean Corpuscular Volume 97, Mean Corpuscular Hemoglobin 33.0H, Mean 

Corpuscular Hemoglobin Concent 34.2, Red Cell Distribution Width 15.1H, 

Platelet Count 127L, Mean Platelet Volume 9.0, Neutrophils (%) (Auto) , 

Lymphocytes (%) (Auto) , Monocytes (%) (Auto) , Eosinophils (%) (Auto) , 

Basophils (%) (Auto) , Differential Total Cells Counted 100, Neutrophils % (

Manual) 89H, Lymphocytes % (Manual) 7L, Monocytes % (Manual) 4, Eosinophils % (

Manual) 0, Basophils % (Manual) 0, Band Neutrophils 0, Platelet Estimate 

DecreasedL, Platelet Morphology Normal, Anisocytosis 1+, Sodium Level 151H, 

Potassium Level 3.8, Chloride Level 119H, Carbon Dioxide Level 26, Anion Gap 6, 

Blood Urea Nitrogen 44H, Creatinine 1.1, Estimat Glomerular Filtration Rate 51.0

, Glucose Level 177H, Calcium Level 7.5L, Phosphorus Level 3.0, Magnesium Level 

2.1, Total Bilirubin 0.4, Aspartate Amino Transf (AST/SGOT) 54H, Alanine 

Aminotransferase (ALT/SGPT) 169H, Alkaline Phosphatase 60, Total Protein 4.6L, 

Albumin 1.2L, Globulin 3.4, Albumin/Globulin Ratio 0.4L


9/9/20 07:29: 


Arterial Blood pH 7.479H, Arterial Blood Partial Pressure CO2 30.4L, Arterial 

Blood Partial Pressure O2 80.1, Arterial Blood HCO3 22.1, Arterial Blood Oxygen 

Saturation 94.8L, Arterial Blood Base Excess -0.9, Cole Test Positive


Height (Feet):  5


Height (Inches):  3.00


Weight (Pounds):  172


General Appearance:  no apparent distress


EENT:  other - Being vented


Cardiovascular:  bradycardia


Respiratory/Chest:  decreased breath sounds


Abdomen:  distended











Lam Nino MD Sep 9, 2020 15:25

## 2020-09-10 VITALS — DIASTOLIC BLOOD PRESSURE: 62 MMHG | SYSTOLIC BLOOD PRESSURE: 122 MMHG

## 2020-09-10 VITALS — SYSTOLIC BLOOD PRESSURE: 101 MMHG | DIASTOLIC BLOOD PRESSURE: 58 MMHG

## 2020-09-10 VITALS — DIASTOLIC BLOOD PRESSURE: 66 MMHG | SYSTOLIC BLOOD PRESSURE: 110 MMHG

## 2020-09-10 VITALS — DIASTOLIC BLOOD PRESSURE: 55 MMHG | SYSTOLIC BLOOD PRESSURE: 98 MMHG

## 2020-09-10 VITALS — SYSTOLIC BLOOD PRESSURE: 101 MMHG | DIASTOLIC BLOOD PRESSURE: 52 MMHG

## 2020-09-10 VITALS — DIASTOLIC BLOOD PRESSURE: 67 MMHG | SYSTOLIC BLOOD PRESSURE: 113 MMHG

## 2020-09-10 VITALS — DIASTOLIC BLOOD PRESSURE: 45 MMHG | SYSTOLIC BLOOD PRESSURE: 106 MMHG

## 2020-09-10 VITALS — SYSTOLIC BLOOD PRESSURE: 117 MMHG | DIASTOLIC BLOOD PRESSURE: 57 MMHG

## 2020-09-10 VITALS — DIASTOLIC BLOOD PRESSURE: 67 MMHG | SYSTOLIC BLOOD PRESSURE: 118 MMHG

## 2020-09-10 VITALS — DIASTOLIC BLOOD PRESSURE: 51 MMHG | SYSTOLIC BLOOD PRESSURE: 98 MMHG

## 2020-09-10 VITALS — DIASTOLIC BLOOD PRESSURE: 58 MMHG | SYSTOLIC BLOOD PRESSURE: 108 MMHG

## 2020-09-10 VITALS — SYSTOLIC BLOOD PRESSURE: 105 MMHG | DIASTOLIC BLOOD PRESSURE: 58 MMHG

## 2020-09-10 VITALS — SYSTOLIC BLOOD PRESSURE: 95 MMHG | DIASTOLIC BLOOD PRESSURE: 72 MMHG

## 2020-09-10 VITALS — SYSTOLIC BLOOD PRESSURE: 107 MMHG | DIASTOLIC BLOOD PRESSURE: 72 MMHG

## 2020-09-10 VITALS — SYSTOLIC BLOOD PRESSURE: 114 MMHG | DIASTOLIC BLOOD PRESSURE: 60 MMHG

## 2020-09-10 VITALS — SYSTOLIC BLOOD PRESSURE: 113 MMHG | DIASTOLIC BLOOD PRESSURE: 66 MMHG

## 2020-09-10 VITALS — SYSTOLIC BLOOD PRESSURE: 119 MMHG | DIASTOLIC BLOOD PRESSURE: 47 MMHG

## 2020-09-10 VITALS — SYSTOLIC BLOOD PRESSURE: 116 MMHG | DIASTOLIC BLOOD PRESSURE: 61 MMHG

## 2020-09-10 VITALS — SYSTOLIC BLOOD PRESSURE: 115 MMHG | DIASTOLIC BLOOD PRESSURE: 52 MMHG

## 2020-09-10 VITALS — DIASTOLIC BLOOD PRESSURE: 56 MMHG | SYSTOLIC BLOOD PRESSURE: 115 MMHG

## 2020-09-10 VITALS — SYSTOLIC BLOOD PRESSURE: 127 MMHG | DIASTOLIC BLOOD PRESSURE: 62 MMHG

## 2020-09-10 VITALS — DIASTOLIC BLOOD PRESSURE: 61 MMHG | SYSTOLIC BLOOD PRESSURE: 107 MMHG

## 2020-09-10 VITALS — SYSTOLIC BLOOD PRESSURE: 96 MMHG | DIASTOLIC BLOOD PRESSURE: 59 MMHG

## 2020-09-10 LAB
ADD MANUAL DIFF: YES
ALBUMIN SERPL-MCNC: 1.2 G/DL (ref 3.4–5)
ALBUMIN/GLOB SERPL: 0.3 {RATIO} (ref 1–2.7)
ALP SERPL-CCNC: 63 U/L (ref 46–116)
ALT SERPL-CCNC: 155 U/L (ref 12–78)
ANION GAP SERPL CALC-SCNC: 5 MMOL/L (ref 5–15)
AST SERPL-CCNC: 60 U/L (ref 15–37)
BILIRUB SERPL-MCNC: 0.4 MG/DL (ref 0.2–1)
BUN SERPL-MCNC: 41 MG/DL (ref 7–18)
CALCIUM SERPL-MCNC: 7.5 MG/DL (ref 8.5–10.1)
CHLORIDE SERPL-SCNC: 119 MMOL/L (ref 98–107)
CO2 SERPL-SCNC: 27 MMOL/L (ref 21–32)
CREAT SERPL-MCNC: 1 MG/DL (ref 0.55–1.3)
ERYTHROCYTE [DISTWIDTH] IN BLOOD BY AUTOMATED COUNT: 14.8 % (ref 11.6–14.8)
GLOBULIN SER-MCNC: 3.5 G/DL
HCT VFR BLD CALC: 27 % (ref 37–47)
HGB BLD-MCNC: 9.1 G/DL (ref 12–16)
MCV RBC AUTO: 97 FL (ref 80–99)
PHOSPHATE SERPL-MCNC: 3 MG/DL (ref 2.5–4.9)
PLATELET # BLD: 161 K/UL (ref 150–450)
POTASSIUM SERPL-SCNC: 3.5 MMOL/L (ref 3.5–5.1)
RBC # BLD AUTO: 2.79 M/UL (ref 4.2–5.4)
SODIUM SERPL-SCNC: 151 MMOL/L (ref 136–145)
WBC # BLD AUTO: 20.4 K/UL (ref 4.8–10.8)

## 2020-09-10 RX ADMIN — SODIUM CHLORIDE SCH MLS/HR: 900 INJECTION, SOLUTION INTRAVENOUS at 07:45

## 2020-09-10 RX ADMIN — MIDODRINE HYDROCHLORIDE SCH MG: 10 TABLET ORAL at 13:17

## 2020-09-10 RX ADMIN — AMIKACIN SULFATE SCH MLS/HR: 500 INJECTION, SOLUTION INTRAMUSCULAR; INTRAVENOUS at 01:31

## 2020-09-10 RX ADMIN — HYDROCORTISONE SODIUM SUCCINATE SCH MG: 100 INJECTION, POWDER, FOR SOLUTION INTRAMUSCULAR; INTRAVENOUS at 05:59

## 2020-09-10 RX ADMIN — HYDROCORTISONE SODIUM SUCCINATE SCH MG: 100 INJECTION, POWDER, FOR SOLUTION INTRAMUSCULAR; INTRAVENOUS at 22:16

## 2020-09-10 RX ADMIN — MEROPENEM SCH MLS/HR: 1 INJECTION INTRAVENOUS at 20:45

## 2020-09-10 RX ADMIN — MIDODRINE HYDROCHLORIDE SCH MG: 10 TABLET ORAL at 22:16

## 2020-09-10 RX ADMIN — MEROPENEM SCH MLS/HR: 1 INJECTION INTRAVENOUS at 11:55

## 2020-09-10 RX ADMIN — MEROPENEM SCH MLS/HR: 1 INJECTION INTRAVENOUS at 03:50

## 2020-09-10 RX ADMIN — VALPROIC ACID SCH MG: 250 SOLUTION ORAL at 22:16

## 2020-09-10 RX ADMIN — PANTOPRAZOLE SODIUM SCH MG: 40 INJECTION, POWDER, FOR SOLUTION INTRAVENOUS at 08:59

## 2020-09-10 RX ADMIN — DOPAMINE HYDROCHLORIDE IN DEXTROSE SCH MLS/HR: 1.6 INJECTION, SOLUTION INTRAVENOUS at 11:15

## 2020-09-10 RX ADMIN — VALPROIC ACID SCH MG: 250 SOLUTION ORAL at 13:17

## 2020-09-10 RX ADMIN — CHLORHEXIDINE GLUCONATE SCH APPLIC: 213 SOLUTION TOPICAL at 20:45

## 2020-09-10 RX ADMIN — HYDROCORTISONE SODIUM SUCCINATE SCH MG: 100 INJECTION, POWDER, FOR SOLUTION INTRAMUSCULAR; INTRAVENOUS at 13:17

## 2020-09-10 RX ADMIN — MIDODRINE HYDROCHLORIDE SCH MG: 10 TABLET ORAL at 05:59

## 2020-09-10 RX ADMIN — VALPROIC ACID SCH MG: 250 SOLUTION ORAL at 05:59

## 2020-09-10 NOTE — NUR
NURSE NOTES:

Received report from LAYTON Garcia. Patient in bed sleeping comfortable, no s/s of acute 
distress noted. No fever. Orally intubated  Fi02 90%O2 Sat 94% on the monitor. HOB 
elevated.G-tube intact, clean and running Vital AF 1.2 at 60ml/hr. Valle intact and draining 
with yellow color urine. Left upper arm PICC intact, clean and running TKO. Kept dry, clean, 
comfortable and HOB>30. Call light placed in easy reach. Bed in lowest position and locked. 
Seizure precaution and contact isolation maintained and observed.SCD on bilateral legs. Will 
continue plan of care.

## 2020-09-10 NOTE — NUR
NURSE NOTES:

Patient in bed sleeping comfortable, no s/s of acute distress noted. No fever. Orally 
intubated  Fi02 95%O2 Sat 92% on the monitor. HOB elevated.G-tube intact, clean and running 
Vital AF 1.2 at 60ml/hr. Valle intact and draining with yellow color urine. Left upper arm 
PICC intact, clean and running TKO. Kept dry, clean, comfortable and HOB>30. Call light 
placed in easy reach. Bed in lowest position and locked. Seizure precaution and contact 
isolation maintained and observed.SCD on bilateral legs. Will continue plan of care.

## 2020-09-10 NOTE — CARDIOLOGY PROGRESS NOTE
Assessment/Plan


Assessment/Plan


sepsis


respiratory failure 


ards 


renal insuf 


bacteremia


abn cardiac enzyme due to demand 


sinus arnold stable 


thrombocytopenia resolved  


hypernatremia 








covid isolation removed as covid -x3


min trop abn 


ekg last one form 8/22 reviewed non ischemic 


echo preformed 8/25 nl lv function 


now off pressor 


vent support 


abx 


tele personally reviewed no pauses 


wbc improved but still up as ofn 9/10 


na increased still 


renal insuf min better 


3rd spacing alot 


cxr form 9/10 reviewed 


tsh seems fine 


sig edema with hypernatremia need nauteresis eventually when bp is better





Subjective


ROS Limited/Unobtainable:  Yes


Subjective


on  a vent not communicative





Objective





Last 24 Hour Vital Signs








  Date Time  Temp Pulse Resp B/P (MAP) Pulse Ox O2 Delivery O2 Flow Rate FiO2


 


9/10/20 19:05  63 26     95


 


9/10/20 18:05  57 26 119/47 (71) 94   


 


9/10/20 17:00  65 26 106/45 (65) 97   


 


9/10/20 17:00      Mechanical Ventilator  





      Mechanical Ventilator  


 


9/10/20 16:00 98.9 68 26 118/67 (84) 93   


 


9/10/20 16:00  66      


 


9/10/20 16:00      Mechanical Ventilator  





      Mechanical Ventilator  


 


9/10/20 16:00        95


 


9/10/20 15:20  69 26     95


 


9/10/20 15:00  67 28 122/62 (82) 91   


 


9/10/20 14:00  60 26 98/55 (69) 91   


 


9/10/20 13:00  56 25 98/55 (69) 90   


 


9/10/20 12:04      Mechanical Ventilator  





      Mechanical Ventilator  


 


9/10/20 12:00  57      


 


9/10/20 12:00 98.9 66 26 96/59 (71) 92   


 


9/10/20 12:00        95


 


9/10/20 11:00  68 25 113/66 (82) 92   


 


9/10/20 10:50  60 26     95


 


9/10/20 10:00  65 25 101/58 (72) 90   


 


9/10/20 09:00  66 26 113/67 (82) 94   


 


9/10/20 08:00  65      


 


9/10/20 08:00      Mechanical Ventilator  





      Mechanical Ventilator  


 


9/10/20 08:00        95


 


9/10/20 08:00 98.5 64 25 107/61 (76) 94   


 


9/10/20 07:00  69 25 107/72 (84) 94   


 


9/10/20 06:50  66 27     95


 


9/10/20 06:00  62 25 95/72 (80) 91   


 


9/10/20 05:00  68 23 98/51 (67) 92   


 


9/10/20 04:00      Mechanical Ventilator  





      Mechanical Ventilator  


 


9/10/20 04:00        95


 


9/10/20 04:00 98.0 67 25 101/52 (68) 91   


 


9/10/20 03:42  66      


 


9/10/20 03:17  61 26     95


 


9/10/20 03:00  52 24 127/62 (83) 93   


 


9/10/20 02:00  47 26 116/61 (79) 90   


 


9/10/20 01:00  48 26 110/66 (81) 93   


 


9/10/20 00:00  68      


 


9/10/20 00:00        95


 


9/10/20 00:00  75 25 117/57 (77) 93   


 


9/10/20 00:00      Mechanical Ventilator  





      Mechanical Ventilator  


 


9/9/20 23:05  58 26     95


 


9/9/20 23:00  59 26 114/49 (70) 94   


 


9/9/20 22:00  64 26 93/50 (64) 93   


 


9/9/20 21:00  58 26 98/53 (68) 92   


 


9/9/20 20:00      Mechanical Ventilator  





      Mechanical Ventilator  


 


9/9/20 20:00 98.9 62 26 107/50 (69) 91   


 


9/9/20 20:00  60      


 


9/9/20 20:00        95


 


9/9/20 19:30  62 26     95








General Appearance:  no apparent distress, on vent, patient on isolation, 

isolation precautions


Neck:  supple


Cardiovascular:  normal rate


Respiratory/Chest:  lungs clear


Abdomen:  normal bowel sounds, non tender, soft


Extremities:  moderate edema











Intake and Output  


 


 9/9/20 9/10/20





 19:00 07:00


 


Intake Total 1175 ml 1314 ml


 


Output Total 680 ml 670 ml


 


Balance 495 ml 644 ml


 


  


 


Free Water 130 ml 150 ml


 


IV Total 555 ml 334 ml


 


Tube Feeding 490 ml 770 ml


 


Other  60 ml


 


Output Urine Total 680 ml 670 ml


 


# Bowel Movements 2 4











Laboratory Tests








Test


  9/10/20


04:30 9/10/20


08:30


 


White Blood Count


  20.4 K/UL


(4.8-10.8)  H 


 


 


Red Blood Count


  2.79 M/UL


(4.20-5.40)  L 


 


 


Hemoglobin


  9.1 G/DL


(12.0-16.0)  L 


 


 


Hematocrit


  27.0 %


(37.0-47.0)  L 


 


 


Mean Corpuscular Volume 97 FL (80-99)   


 


Mean Corpuscular Hemoglobin


  32.6 PG


(27.0-31.0)  H 


 


 


Mean Corpuscular Hemoglobin


Concent 33.7 G/DL


(32.0-36.0) 


 


 


Red Cell Distribution Width


  14.8 %


(11.6-14.8) 


 


 


Platelet Count


  161 K/UL


(150-450) 


 


 


Mean Platelet Volume


  9.1 FL


(6.5-10.1) 


 


 


Neutrophils (%) (Auto)


  % (45.0-75.0)


  


 


 


Lymphocytes (%) (Auto)


  % (20.0-45.0)


  


 


 


Monocytes (%) (Auto)  % (1.0-10.0)   


 


Eosinophils (%) (Auto)  % (0.0-3.0)   


 


Basophils (%) (Auto)  % (0.0-2.0)   


 


Differential Total Cells


Counted 100  


  


 


 


Neutrophils % (Manual) 93 % (45-75)  H 


 


Lymphocytes % (Manual) 5 % (20-45)  L 


 


Monocytes % (Manual) 2 % (1-10)   


 


Eosinophils % (Manual) 0 % (0-3)   


 


Basophils % (Manual) 0 % (0-2)   


 


Band Neutrophils 0 % (0-8)   


 


Platelet Estimate Adequate   


 


Platelet Morphology Normal   


 


Hypochromasia 1+   


 


Anisocytosis 1+   


 


Macrocytosis 1+   


 


Sodium Level


  151 MMOL/L


(136-145)  H 


 


 


Potassium Level


  3.5 MMOL/L


(3.5-5.1) 


 


 


Chloride Level


  119 MMOL/L


()  H 


 


 


Carbon Dioxide Level


  27 MMOL/L


(21-32) 


 


 


Anion Gap


  5 mmol/L


(5-15) 


 


 


Blood Urea Nitrogen


  41 mg/dL


(7-18)  H 


 


 


Creatinine


  1.0 MG/DL


(0.55-1.30) 


 


 


Estimat Glomerular Filtration


Rate 56.9 mL/min


(>60) 


 


 


Glucose Level


  143 MG/DL


()  H 


 


 


Calcium Level


  7.5 MG/DL


(8.5-10.1)  L 


 


 


Phosphorus Level


  3.0 MG/DL


(2.5-4.9) 


 


 


Magnesium Level


  2.0 MG/DL


(1.8-2.4) 


 


 


Total Bilirubin


  0.4 MG/DL


(0.2-1.0) 


 


 


Aspartate Amino Transf


(AST/SGOT) 60 U/L (15-37)


H 


 


 


Alanine Aminotransferase


(ALT/SGPT) 155 U/L


(12-78)  H 


 


 


Alkaline Phosphatase


  63 U/L


() 


 


 


Total Protein


  4.7 G/DL


(6.4-8.2)  L 


 


 


Albumin


  1.2 G/DL


(3.4-5.0)  L 


 


 


Globulin 3.5 g/dL   


 


Albumin/Globulin Ratio


  0.3 (1.0-2.7)


L 


 


 


Arterial Blood pH


  


  7.483


(7.350-7.450)


 


Arterial Blood Partial


Pressure CO2 


  30.1 mmHg


(35.0-45.0)  L


 


Arterial Blood Partial


Pressure O2 


  63.4 mmHg


(75.0-100.0)  L


 


Arterial Blood HCO3


  


  22.1 mmol/L


(22.0-26.0)


 


Arterial Blood Oxygen


Saturation 


  91.9 %


()  L


 


Arterial Blood Base Excess  -0.8 (-2-2)  


 


Cole Test  Positive  











Microbiology








 Date/Time


Source Procedure


Growth Status


 


 


 9/8/20 13:11


Blood Blood Culture - Preliminary


NO GROWTH AFTER 24 HOURS Resulted

















Constantine Jorgensen MD Sep 10, 2020 19:27

## 2020-09-10 NOTE — NEPHROLOGY PROGRESS NOTE
Assessment/Plan


Problem List:  


(1) DAVID (acute kidney injury)


(2) Respiratory failure requiring intubation


(3) Down's syndrome


(4) Seizure disorder


(5) Hypothyroidism


Assessment





Acute renal failure, likely due to hypotension


Acute respiratory distress, hypoxia


Seizure disorder


Hypothyroidism


Down syndrome


Full code











Fluid challenge with IV fluids and albumin


Midodrine for BP above 100 systolic


Check TSH level


Check


Correct level


Monitor renal parameters


Urine studies


Per orders


Plan


September 10: Lab reviewed.  Status unchanged.  Serum sodium 151 unchanged.  

Stable from renal standpoint of view.


September 9: Labs reviewed.  Status quo.  D5W 500 cc IV ordered.  Continue to 

monitor renal parameters.


September 8: Status quo.  Labs reviewed.  Overall condition unchanged.  Patient 

was transfused and hemoglobin higher.  Continue current management.  Patient 

remains full code.


September 7: Status quo.  Overall condition poor.  Very low albumin.  

Edematous.  Hypotensive.  Hemoglobin lower.  Anemia work-up ordered.  I favor 

transfusion 2 units of packed RBCs.  Patient remains full code.  I favor 

supportive care only.  Will discuss.


September 6: Electrolyte abnormalities addressed.  Serum creatinine lower.  

Continue per current management.


September 5: Status unchanged.  Lab reviewed.  Serum potassium 2.7.  IV 

potassium chloride ordered.  Serum creatinine low at 1.6 stable.  Blood 

pressure 90s systolic


September 4: Status quo.  Labs reviewed.  Renal parameters stable.  Serum 

creatinine down to 1.6.  Medication list reviewed.  Continues to be on 

midodrine.  Continue per consultants.


September 3: Status quo.  Labs reviewed.  Electrolytes adjusted.  Serum 

creatinine down to 1.8.  Continue per consultants.


September 2: Status quo.  Labs reviewed.  Phosphorus supplement IV given.  

Serum creatinine 2.  Continue per consultants.


September 1: Requires less pressors.  Albumin bolus given.  1 dose of Lasix IV 

ordered as the patient severely edematous.  Patient serum albumin is very low.  

Continue per consultants.


August 31: Continues to be intubated.  Labs reviewed.  Serum creatinine 1.9 

unchanged.  Blood pressure more stable.  Off 1 of the pressors.  Continue to 

monitor renal parameters.  Continue per consultants.  Patient now on 

hydrocortisone 100 mg every 8 hours.  Will decrease IV fluid.  Normal saline 

down to 50 cc an hour.


August 30: Intubated.  Labs reviewed.  Creatinine 1.9 unchanged.  Continue same 

treatment plan.  Per consultants.  Overall poor prognosis since the patient 

remains on pressors and her pulmonary status is worsening.


August 29: Remains intubated.  Labs reviewed.  Creatinine 1.9.  Blood pressure 

systolic 90s.  Continue per consultants.


August 28: Remains intubated.  Labs reviewed.  Serum creatinine lower to 2.  

Vancomycin level lower.  Remains hypotensive on pressors.  Will increase 

midodrine to 10 mg every 8 hours.  Continue per consultants.  Continue to 

monitor renal parameters.


August 27: Patient now in ICU.  Intubated.  On pressors.  Labs reviewed.  Will 

increase midodrine.  Aim to keep blood pressure over 100 systolic.  Will give 

albumin bolus.  Will check vancomycin level which was elevated when checked 

previously on August 24.  Will monitor renal parameters.  Continue per 

consultants.





Subjective


ROS Limited/Unobtainable:  Yes





Objective


Objective





Last 24 Hour Vital Signs








  Date Time  Temp Pulse Resp B/P (MAP) Pulse Ox O2 Delivery O2 Flow Rate FiO2


 


9/10/20 08:00        95


 


9/10/20 08:00 98.5 64 25 107/61 (76) 94   


 


9/10/20 07:00  69 25 107/72 (84) 94   


 


9/10/20 06:50  66 27     95


 


9/10/20 06:00  62 25 95/72 (80) 91   


 


9/10/20 05:00  68 23 98/51 (67) 92   


 


9/10/20 04:00      Mechanical Ventilator  





      Mechanical Ventilator  


 


9/10/20 04:00        95


 


9/10/20 04:00 98.0 67 25 101/52 (68) 91   


 


9/10/20 03:42  66      


 


9/10/20 03:17  61 26     95


 


9/10/20 03:00  52 24 127/62 (83) 93   


 


9/10/20 02:00  47 26 116/61 (79) 90   


 


9/10/20 01:00  48 26 110/66 (81) 93   


 


9/10/20 00:00  68      


 


9/10/20 00:00        95


 


9/10/20 00:00  75 25 117/57 (77) 93   


 


9/10/20 00:00      Mechanical Ventilator  





      Mechanical Ventilator  


 


9/9/20 23:05  58 26     95


 


9/9/20 23:00  59 26 114/49 (70) 94   


 


9/9/20 22:00  64 26 93/50 (64) 93   


 


9/9/20 21:00  58 26 98/53 (68) 92   


 


9/9/20 20:00      Mechanical Ventilator  





      Mechanical Ventilator  


 


9/9/20 20:00 98.9 62 26 107/50 (69) 91   


 


9/9/20 20:00  60      


 


9/9/20 20:00        95


 


9/9/20 19:30  62 26     95


 


9/9/20 19:00  54 26 101/51 (68) 93   


 


9/9/20 18:00  54 26 109/50 (69) 91   


 


9/9/20 17:00  57 26 97/47 (64) 94   


 


9/9/20 16:00  65      


 


9/9/20 16:00 98.8 59 26 121/58 (79) 94   


 


9/9/20 16:00        90


 


9/9/20 16:00      Mechanical Ventilator  





      Mechanical Ventilator  


 


9/9/20 15:25  67 26     90


 


9/9/20 15:00  61 25 128/60 (82) 94   


 


9/9/20 14:00  58 25 107/67 (80) 93   


 


9/9/20 13:00  58 26 108/58 (75) 92   


 


9/9/20 12:00 98.0 55 26 105/56 (72) 94   


 


9/9/20 12:00      Mechanical Ventilator  





      Mechanical Ventilator  


 


9/9/20 12:00  55      


 


9/9/20 12:00        90


 


9/9/20 11:30  55 26     90


 


9/9/20 11:15    130/63    


 


9/9/20 11:00  55 25 114/57 (76) 90   


 


9/9/20 10:00  51 26 130/63 (85) 90   


 


9/9/20 09:16  56 26     90

















Intake and Output  


 


 9/9/20 9/10/20





 19:00 07:00


 


Intake Total 1175 ml 1314 ml


 


Output Total 680 ml 670 ml


 


Balance 495 ml 644 ml


 


  


 


Free Water 130 ml 150 ml


 


IV Total 555 ml 334 ml


 


Tube Feeding 490 ml 770 ml


 


Other  60 ml


 


Output Urine Total 680 ml 670 ml


 


# Bowel Movements 2 4








Laboratory Tests


9/9/20 13:55: 


Urine Color Yellow, Urine Appearance Clear, Urine pH 6, Urine Specific Gravity 

1.015, Urine Protein 2+H, Urine Glucose (UA) Negative, Urine Ketones Negative, 

Urine Blood 5+H, Urine Nitrite Negative, Urine Bilirubin Negative, Urine 

Urobilinogen Normal, Urine Leukocyte Esterase Negative, Urine RBC 15-20H, Urine 

WBC 0-2, Urine Squamous Epithelial Cells Occasional, Urine Bacteria Few


9/10/20 04:30: 


White Blood Count 20.4H, Red Blood Count 2.79L, Hemoglobin 9.1L, Hematocrit 

27.0L, Mean Corpuscular Volume 97, Mean Corpuscular Hemoglobin 32.6H, Mean 

Corpuscular Hemoglobin Concent 33.7, Red Cell Distribution Width 14.8, Platelet 

Count 161, Mean Platelet Volume 9.1, Neutrophils (%) (Auto) , Lymphocytes (%) (

Auto) , Monocytes (%) (Auto) , Eosinophils (%) (Auto) , Basophils (%) (Auto) , 

Differential Total Cells Counted 100, Neutrophils % (Manual) 93H, Lymphocytes % 

(Manual) 5L, Monocytes % (Manual) 2, Eosinophils % (Manual) 0, Basophils % (

Manual) 0, Band Neutrophils 0, Platelet Estimate Adequate, Platelet Morphology 

Normal, Hypochromasia 1+, Anisocytosis 1+, Macrocytosis 1+, Sodium Level 151H, 

Potassium Level 3.5, Chloride Level 119H, Carbon Dioxide Level 27, Anion Gap 5, 

Blood Urea Nitrogen 41H, Creatinine 1.0, Estimat Glomerular Filtration Rate 56.9

, Glucose Level 143H, Calcium Level 7.5L, Phosphorus Level 3.0, Magnesium Level 

2.0, Total Bilirubin 0.4, Aspartate Amino Transf (AST/SGOT) 60H, Alanine 

Aminotransferase (ALT/SGPT) 155H, Alkaline Phosphatase 63, Total Protein 4.7L, 

Albumin 1.2L, Globulin 3.5, Albumin/Globulin Ratio 0.3L


9/10/20 08:30: 


Arterial Blood pH 7.483H, Arterial Blood Partial Pressure CO2 30.1L, Arterial 

Blood Partial Pressure O2 63.4L, Arterial Blood HCO3 22.1, Arterial Blood 

Oxygen Saturation 91.9L, Arterial Blood Base Excess -0.8, Cole Test Positive


Height (Feet):  5


Height (Inches):  3.00


Weight (Pounds):  172


General Appearance:  no apparent distress


EENT:  other


Cardiovascular:  normal rate


Respiratory/Chest:  decreased breath sounds


Abdomen:  distended











Lam Nino MD Sep 10, 2020 09:14

## 2020-09-10 NOTE — DIAGNOSTIC IMAGING REPORT
Indication: Dyspnea

 

Technique: One view of the chest

 

Comparison: 9/9/2020

 

Findings: Stable satisfactory position of endotracheal tube. Left arm PICC is again

aerated. Bilateral interstitial and airspace diffuse infiltrates versus edema are

unchanged.

 

Impression:  Unchanged, over one day, findings as above.

## 2020-09-10 NOTE — NUR
NURSE NOTES:

Pt noted with large BM to formed brown stools,bed bath given, pulled up repositioned to LT 
side.

## 2020-09-10 NOTE — INTERNAL MED PROGRESS NOTE
Subjective


Date of Service:  Sep 10, 2020


Physician Name


Sanya Schultz


Attending Physician


Chano Cherry MD





Current Medications








 Medications


  (Trade)  Dose


 Ordered  Sig/Didi


 Route


 PRN Reason  Start Time


 Stop Time Status Last Admin


Dose Admin


 


 Acetaminophen


  (Tylenol)  650 mg  Q4H  PRN


 GT


 FEVER  8/26/20 11:45


 9/25/20 11:44  9/7/20 03:17


 


 


 Amikacin Protocol


  (Amikacin


 pharmacy to dose)  1 ea  DAILY  PRN


 MISC


 Per rx protocol  9/8/20 12:00


 10/8/20 11:59   


 


 


 Amikacin Sulfate


 1000 mg/Sodium


 Chloride  114 ml @ 


 114 mls/hr  Q36H


 IV


   9/8/20 14:00


 9/15/20 13:59  9/10/20 01:31


 


 


 Chlorhexidine


 Gluconate


  (Beatrice-Hex 2%)  1 applic  DAILY@2000


 TOPIC


   8/31/20 20:00


 11/29/20 19:59  9/9/20 20:44


 


 


 Clotrimazole


  (Lotrimin)  1 applic  Q12HR


 TOPIC


   8/30/20 13:00


 11/28/20 12:59  9/10/20 08:59


 


 


 Dextrose


  (Dextrose 50%)  25 ml  Q30M  PRN


 IV


 Hypoglycemia  8/26/20 11:30


 11/20/20 11:29   


 


 


 Dextrose


  (Dextrose 50%)  50 ml  Q30M  PRN


 IV


 Hypoglycemia  8/26/20 11:30


 11/20/20 11:29   


 


 


 Dopamine HCl/


 Dextrose  250 ml @ 0


 mls/hr  Q24H


 IV


   9/4/20 11:15


 12/3/20 11:14  9/6/20 19:47


 


 


 Hydrocortisone


  (Solu-CORTEF)  100 mg  EVERY 8  HOURS


 IV


   8/30/20 14:00


 11/28/20 13:59  9/10/20 13:17


 


 


 Levothyroxine


 Sodium


  (Synthroid)  75 mcg  DAILY


 GT


   8/27/20 09:00


 9/22/20 08:59  9/10/20 08:59


 


 


 Lorazepam


  (Ativan 2mg/ml


 1ml)  2 mg  Q2H  PRN


 IV


 For Anxiety  9/4/20 17:30


 9/11/20 17:29  9/4/20 17:37


 


 


 Meropenem 1 gm/


 Sodium Chloride  55 ml @ 


 110 mls/hr  Q8H


 IVPB


   9/6/20 12:00


 9/12/20 11:59  9/10/20 11:55


 


 


 Midodrine


  (Pro-Amatine)  10 mg  Q8HR


 GT


   8/28/20 14:00


 11/26/20 13:59  9/10/20 13:17


 


 


 Norepinephrine


 Bitartrate 16 mg/


 Dextrose  500 ml @ 0


 mls/hr  Q24H


 IV


   8/31/20 07:45


 9/29/20 12:59  9/4/20 08:43


 


 


 Ondansetron HCl


  (Zofran)  4 mg  Q6H  PRN


 IVP


 Nausea & Vomiting  8/26/20 12:00


 9/21/20 11:59   


 


 


 Pantoprazole


  (Protonix)  40 mg  DAILY


 IV


   8/27/20 09:00


 9/22/20 08:59  9/10/20 08:59


 


 


 Phenylephrine HCl


 50 mg/Dextrose  250 ml @ 0


 mls/hr  Q24H  PRN


 IV


 For hypotension  8/29/20 17:45


 9/28/20 17:44  8/31/20 01:49


 


 


 Polyethylene


 Glycol


  (Miralax)  17 gm  DAILYPRN  PRN


 GT


 Constipation  8/26/20 12:00


 9/21/20 11:59   


 


 


 Valproic Acid


  (Depakene)  500 mg  EVERY 8  HOURS


 GT


   8/26/20 14:00


 9/21/20 21:59  9/10/20 13:17


 








Allergies:  


Coded Allergies:  


     No Known Allergies (Unverified , 1/8/19)


ROS Limited/Unobtainable:  Yes


Subjective


58 YO F with Down's syndrome admitted with hypoxia.  Now sepsis and pneumonia.  

Cover for Int Phi-DR Hung.  ICU.  Intubated and sedated





Objective





Last Vital Signs








  Date Time  Temp Pulse Resp B/P (MAP) Pulse Ox O2 Delivery O2 Flow Rate FiO2


 


9/10/20 18:05  57 26 119/47 (71) 94   


 


9/10/20 17:00      Mechanical Ventilator  





      Mechanical Ventilator  


 


9/10/20 16:00 98.9       


 


9/10/20 16:00        95











Laboratory Tests








Test


  9/10/20


04:30 9/10/20


08:30


 


White Blood Count


  20.4 K/UL


(4.8-10.8)  H 


 


 


Red Blood Count


  2.79 M/UL


(4.20-5.40)  L 


 


 


Hemoglobin


  9.1 G/DL


(12.0-16.0)  L 


 


 


Hematocrit


  27.0 %


(37.0-47.0)  L 


 


 


Mean Corpuscular Volume 97 FL (80-99)   


 


Mean Corpuscular Hemoglobin


  32.6 PG


(27.0-31.0)  H 


 


 


Mean Corpuscular Hemoglobin


Concent 33.7 G/DL


(32.0-36.0) 


 


 


Red Cell Distribution Width


  14.8 %


(11.6-14.8) 


 


 


Platelet Count


  161 K/UL


(150-450) 


 


 


Mean Platelet Volume


  9.1 FL


(6.5-10.1) 


 


 


Neutrophils (%) (Auto)


  % (45.0-75.0)


  


 


 


Lymphocytes (%) (Auto)


  % (20.0-45.0)


  


 


 


Monocytes (%) (Auto)  % (1.0-10.0)   


 


Eosinophils (%) (Auto)  % (0.0-3.0)   


 


Basophils (%) (Auto)  % (0.0-2.0)   


 


Differential Total Cells


Counted 100  


  


 


 


Neutrophils % (Manual) 93 % (45-75)  H 


 


Lymphocytes % (Manual) 5 % (20-45)  L 


 


Monocytes % (Manual) 2 % (1-10)   


 


Eosinophils % (Manual) 0 % (0-3)   


 


Basophils % (Manual) 0 % (0-2)   


 


Band Neutrophils 0 % (0-8)   


 


Platelet Estimate Adequate   


 


Platelet Morphology Normal   


 


Hypochromasia 1+   


 


Anisocytosis 1+   


 


Macrocytosis 1+   


 


Sodium Level


  151 MMOL/L


(136-145)  H 


 


 


Potassium Level


  3.5 MMOL/L


(3.5-5.1) 


 


 


Chloride Level


  119 MMOL/L


()  H 


 


 


Carbon Dioxide Level


  27 MMOL/L


(21-32) 


 


 


Anion Gap


  5 mmol/L


(5-15) 


 


 


Blood Urea Nitrogen


  41 mg/dL


(7-18)  H 


 


 


Creatinine


  1.0 MG/DL


(0.55-1.30) 


 


 


Estimat Glomerular Filtration


Rate 56.9 mL/min


(>60) 


 


 


Glucose Level


  143 MG/DL


()  H 


 


 


Calcium Level


  7.5 MG/DL


(8.5-10.1)  L 


 


 


Phosphorus Level


  3.0 MG/DL


(2.5-4.9) 


 


 


Magnesium Level


  2.0 MG/DL


(1.8-2.4) 


 


 


Total Bilirubin


  0.4 MG/DL


(0.2-1.0) 


 


 


Aspartate Amino Transf


(AST/SGOT) 60 U/L (15-37)


H 


 


 


Alanine Aminotransferase


(ALT/SGPT) 155 U/L


(12-78)  H 


 


 


Alkaline Phosphatase


  63 U/L


() 


 


 


Total Protein


  4.7 G/DL


(6.4-8.2)  L 


 


 


Albumin


  1.2 G/DL


(3.4-5.0)  L 


 


 


Globulin 3.5 g/dL   


 


Albumin/Globulin Ratio


  0.3 (1.0-2.7)


L 


 


 


Arterial Blood pH


  


  7.483


(7.350-7.450)


 


Arterial Blood Partial


Pressure CO2 


  30.1 mmHg


(35.0-45.0)  L


 


Arterial Blood Partial


Pressure O2 


  63.4 mmHg


(75.0-100.0)  L


 


Arterial Blood HCO3


  


  22.1 mmol/L


(22.0-26.0)


 


Arterial Blood Oxygen


Saturation 


  91.9 %


()  L


 


Arterial Blood Base Excess  -0.8 (-2-2)  


 


Cole Test  Positive  











Microbiology








 Date/Time


Source Procedure


Growth Status


 


 


 9/8/20 13:11


Blood Blood Culture - Preliminary


NO GROWTH AFTER 24 HOURS Resulted

















Intake and Output  


 


 9/9/20 9/10/20





 19:00 07:00


 


Intake Total 1175 ml 1314 ml


 


Output Total 680 ml 670 ml


 


Balance 495 ml 644 ml


 


  


 


Free Water 130 ml 150 ml


 


IV Total 555 ml 334 ml


 


Tube Feeding 490 ml 770 ml


 


Other  60 ml


 


Output Urine Total 680 ml 670 ml


 


# Bowel Movements 2 4








Objective


General Appearance:  WD/WN, no apparent distress, alert


EENT:  PERRL/EOMI, normal ENT inspection


Neck:  non-tender, normal alignment, supple, normal inspection


Cardiovascular:  normal peripheral pulses, normal rate, regular rhythm, no 

gallop/murmur, no JVD


Respiratory/Chest:  Mech vent; decreased breath sounds, crackles/rales, rhonchi 

- bilaterally, expiratory wheezing


Abdomen:  normal bowel sounds, non tender, soft, no organomegaly, no mass


Extremities:  normal range of motion


Neurologic:  CNs II-XII grossly normal


Skin:  normal pigmentation, warm/dry





Assessment/Plan


Problem List:  


(1) HCAP (healthcare-associated pneumonia)


Assessment & Plan:  Strep Group G.  Continue daptomycin per ID=Dr Gomes.  

Pulmonary/Critical care=DR Cherry.  COVID NEG





(2) Sepsis


Assessment & Plan:  Staph haemolyticus.  Continue amikacin and meropenem per ID=

Dr Gomes





(3) Down's syndrome


(4) Dysphagia


Assessment & Plan:  S/P PEG





(5) Seizure disorder


Assessment & Plan:  Continue keppra and depakote





(6) Hypothyroidism


Assessment & Plan:  Continue synthroid





(7) Acute respiratory failure


Assessment & Plan:  Pulmonary = Dr Cherry; [facility]














Sanya Schultz MD Sep 10, 2020 19:04

## 2020-09-10 NOTE — NUR
NURSE NOTES:

patient in bed suctioned orally. no s/s of acute distress noted. HOB elevated. Gt intact no 
residual. seizure precaution and contact isolation maintained and observed. frequent visual 
checks continued

## 2020-09-10 NOTE — GENERAL PROGRESS NOTE
Assessment/Plan


Assessment/Plan:


1. History of Down syndrome.


2. Dysphagia with G-tube.


3. Seizure disorder.


4. Hypothyroidism.


5. DAVID.


6. Pneumonia.


7. Sepsis.








fu H&H


ppi


GTF


hold GI procedures for now





Subjective


ROS Limited/Unobtainable:  No


Allergies:  


Coded Allergies:  


     No Known Allergies (Unverified , 1/8/19)





Objective





Last 24 Hour Vital Signs








  Date Time  Temp Pulse Resp B/P (MAP) Pulse Ox O2 Delivery O2 Flow Rate FiO2


 


9/10/20 13:00  56 25 98/55 (69) 90   


 


9/10/20 12:04      Mechanical Ventilator  





      Mechanical Ventilator  


 


9/10/20 12:00  57      


 


9/10/20 12:00 98.9 66 26 96/59 (71) 92   


 


9/10/20 12:00        95


 


9/10/20 11:00  68 25 113/66 (82) 92   


 


9/10/20 10:50  60 26     95


 


9/10/20 10:00  65 25 101/58 (72) 90   


 


9/10/20 09:00  66 26 113/67 (82) 94   


 


9/10/20 08:00  65      


 


9/10/20 08:00      Mechanical Ventilator  





      Mechanical Ventilator  


 


9/10/20 08:00        95


 


9/10/20 08:00 98.5 64 25 107/61 (76) 94   


 


9/10/20 07:00  69 25 107/72 (84) 94   


 


9/10/20 06:50  66 27     95


 


9/10/20 06:00  62 25 95/72 (80) 91   


 


9/10/20 05:00  68 23 98/51 (67) 92   


 


9/10/20 04:00      Mechanical Ventilator  





      Mechanical Ventilator  


 


9/10/20 04:00        95


 


9/10/20 04:00 98.0 67 25 101/52 (68) 91   


 


9/10/20 03:42  66      


 


9/10/20 03:17  61 26     95


 


9/10/20 03:00  52 24 127/62 (83) 93   


 


9/10/20 02:00  47 26 116/61 (79) 90   


 


9/10/20 01:00  48 26 110/66 (81) 93   


 


9/10/20 00:00  68      


 


9/10/20 00:00        95


 


9/10/20 00:00  75 25 117/57 (77) 93   


 


9/10/20 00:00      Mechanical Ventilator  





      Mechanical Ventilator  


 


9/9/20 23:05  58 26     95


 


9/9/20 23:00  59 26 114/49 (70) 94   


 


9/9/20 22:00  64 26 93/50 (64) 93   


 


9/9/20 21:00  58 26 98/53 (68) 92   


 


9/9/20 20:00      Mechanical Ventilator  





      Mechanical Ventilator  


 


9/9/20 20:00 98.9 62 26 107/50 (69) 91   


 


9/9/20 20:00  60      


 


9/9/20 20:00        95


 


9/9/20 19:30  62 26     95


 


9/9/20 19:00  54 26 101/51 (68) 93   


 


9/9/20 18:00  54 26 109/50 (69) 91   


 


9/9/20 17:00  57 26 97/47 (64) 94   


 


9/9/20 16:00  65      


 


9/9/20 16:00 98.8 59 26 121/58 (79) 94   


 


9/9/20 16:00        90


 


9/9/20 16:00      Mechanical Ventilator  





      Mechanical Ventilator  


 


9/9/20 15:25  67 26     90


 


9/9/20 15:00  61 25 128/60 (82) 94   


 


9/9/20 14:00  58 25 107/67 (80) 93   

















Intake and Output  


 


 9/9/20 9/10/20





 19:00 07:00


 


Intake Total 1175 ml 1314 ml


 


Output Total 680 ml 670 ml


 


Balance 495 ml 644 ml


 


  


 


Free Water 130 ml 150 ml


 


IV Total 555 ml 334 ml


 


Tube Feeding 490 ml 770 ml


 


Other  60 ml


 


Output Urine Total 680 ml 670 ml


 


# Bowel Movements 2 4








Laboratory Tests


9/9/20 13:55: 


Urine Color Yellow, Urine Appearance Clear, Urine pH 6, Urine Specific Gravity 

1.015, Urine Protein 2+H, Urine Glucose (UA) Negative, Urine Ketones Negative, 

Urine Blood 5+H, Urine Nitrite Negative, Urine Bilirubin Negative, Urine 

Urobilinogen Normal, Urine Leukocyte Esterase Negative, Urine RBC 15-20H, Urine 

WBC 0-2, Urine Squamous Epithelial Cells Occasional, Urine Bacteria Few


9/10/20 04:30: 


White Blood Count 20.4H, Red Blood Count 2.79L, Hemoglobin 9.1L, Hematocrit 

27.0L, Mean Corpuscular Volume 97, Mean Corpuscular Hemoglobin 32.6H, Mean 

Corpuscular Hemoglobin Concent 33.7, Red Cell Distribution Width 14.8, Platelet 

Count 161, Mean Platelet Volume 9.1, Neutrophils (%) (Auto) , Lymphocytes (%) (

Auto) , Monocytes (%) (Auto) , Eosinophils (%) (Auto) , Basophils (%) (Auto) , 

Differential Total Cells Counted 100, Neutrophils % (Manual) 93H, Lymphocytes % 

(Manual) 5L, Monocytes % (Manual) 2, Eosinophils % (Manual) 0, Basophils % (

Manual) 0, Band Neutrophils 0, Platelet Estimate Adequate, Platelet Morphology 

Normal, Hypochromasia 1+, Anisocytosis 1+, Macrocytosis 1+, Sodium Level 151H, 

Potassium Level 3.5, Chloride Level 119H, Carbon Dioxide Level 27, Anion Gap 5, 

Blood Urea Nitrogen 41H, Creatinine 1.0, Estimat Glomerular Filtration Rate 56.9

, Glucose Level 143H, Calcium Level 7.5L, Phosphorus Level 3.0, Magnesium Level 

2.0, Total Bilirubin 0.4, Aspartate Amino Transf (AST/SGOT) 60H, Alanine 

Aminotransferase (ALT/SGPT) 155H, Alkaline Phosphatase 63, Total Protein 4.7L, 

Albumin 1.2L, Globulin 3.5, Albumin/Globulin Ratio 0.3L


9/10/20 08:30: 


Arterial Blood pH 7.483H, Arterial Blood Partial Pressure CO2 30.1L, Arterial 

Blood Partial Pressure O2 63.4L, Arterial Blood HCO3 22.1, Arterial Blood 

Oxygen Saturation 91.9L, Arterial Blood Base Excess -0.8, Cole Test Positive


Height (Feet):  5


Height (Inches):  3.00


Weight (Pounds):  172


General Appearance:  no apparent distress


EENT:  normal ENT inspection


Neck:  supple


Cardiovascular:  normal rate


Respiratory/Chest:  decreased breath sounds


Abdomen:  normal bowel sounds, non tender, soft


Extremities:  non-tender











Ted Nagy MD Sep 10, 2020 13:36

## 2020-09-10 NOTE — NUR
RESPIRATORY NOTE:

Received pt on AC 26, 500VT, 95%, PEEP +10. Pt intubated w/ ETT 7.5 @ 22cm lipline, secured 
by anchorfast. Pt is awake/disoriented, responds to stimuli. B/S susihl. rhonchi, sxn small 
amounts of thin/frothy, pale-yellow secretions. Vent plugged into red outlet, [place] at 
bedside. Pt resting comfortably, in no apparent distress at this time. Will continue to 
monitor pt.

## 2020-09-10 NOTE — NUR
SI:    RESP FAILURE ETT/VENT SUPPORT,PNA

T. 98.9 HR 56 RR 25 B/P 96/69

AC 12  FIO2 95% PEEP 10

WBC 25.4  BUN 41







IS:      AMIKACIN IV

MEROPENEM IV

SOLU CORTEF IV

PROTONIX IV

*******ICU STATUS******

## 2020-09-10 NOTE — NUR
NURSE NOTES:

patient in bed suctioned orally. no s/s of acute distress noted. HOB elevated. Gt intact no 
residual. seizure precaution and contact isolation maintained and observed. frequent visual 
checks continued Cr downtrending from 3.25 on admission to 1.8, hx of CKD IV  Had ATN prior admission and briefly required HD, discharged on 6/26 with Cr of 2.27. No indication for RRT at this time. HD catheter removed on 7/3  encourage PO hydration, home torsemide on hold  Renal following.

## 2020-09-10 NOTE — NUR
NURSE NOTES:



patient in bed sleeping comfortably. no s/s of acute distress noted. HOB elevated. Gt intact 
no residual. seizure precaution and contact isolation maintained and observed. frequent 
visual checks continued

## 2020-09-10 NOTE — NUR
NURSE NOTES:

patient in bed awake, Repositioned patient in bed. TV therapy provided. no s/s of acute 
distress noted. HOB elevated. Gt intact no residual. seizure precaution and contact 
isolation maintained and observed. frequent visual checks continued. no s/s of 
hypo/hyperglycemia. SR on cardiac monitor HR 67. will continue plan of care.

## 2020-09-10 NOTE — PULMONOLGY CRITICAL CARE NOTE
Critical Care - Asmt/Plan


Problems:  


(1) Acute respiratory failure


(2) Bacteremia


(3) Pneumonia


(4) Sepsis


(5) HCAP (healthcare-associated pneumonia)


(6) Seizure disorder


(7) Down's syndrome


Respiratory:  monitor respiratory rate, adjust FIO2, CXR


Cardiac:  continue pressors, continue to monitor HR/BP


Renal:  F/U I&O, check electrolytes


Infectious Disease:  check cultures


Gastrointestinal:  continue feedings/current rate


Endocrine:  monitor blood sugar


Hematologic:  monitor H/H, transfuse if hgb<8.5


Neurologic:  PRN Ativan, keep patient comfortable


Affect:  PRN ativan


Prophylaxis:  Protonix


Time Spent (Minutes):  40


Notes Reviewed:  internist, cardio, renal


Discussed with:  nurses, consultants, 





Critical Care - Objective





Last 24 Hour Vital Signs








  Date Time  Temp Pulse Resp B/P (MAP) Pulse Ox O2 Delivery O2 Flow Rate FiO2


 


9/10/20 11:00  68 25 113/66 (82) 92   


 


9/10/20 10:50  60 26     95


 


9/10/20 10:00  65 25 101/58 (72) 90   


 


9/10/20 09:00  66 26 113/67 (82) 94   


 


9/10/20 08:00  65      


 


9/10/20 08:00      Mechanical Ventilator  





      Mechanical Ventilator  


 


9/10/20 08:00        95


 


9/10/20 08:00 98.5 64 25 107/61 (76) 94   


 


9/10/20 07:00  69 25 107/72 (84) 94   


 


9/10/20 06:50  66 27     95


 


9/10/20 06:00  62 25 95/72 (80) 91   


 


9/10/20 05:00  68 23 98/51 (67) 92   


 


9/10/20 04:00      Mechanical Ventilator  





      Mechanical Ventilator  


 


9/10/20 04:00        95


 


9/10/20 04:00 98.0 67 25 101/52 (68) 91   


 


9/10/20 03:42  66      


 


9/10/20 03:17  61 26     95


 


9/10/20 03:00  52 24 127/62 (83) 93   


 


9/10/20 02:00  47 26 116/61 (79) 90   


 


9/10/20 01:00  48 26 110/66 (81) 93   


 


9/10/20 00:00  68      


 


9/10/20 00:00        95


 


9/10/20 00:00  75 25 117/57 (77) 93   


 


9/10/20 00:00      Mechanical Ventilator  





      Mechanical Ventilator  


 


9/9/20 23:05  58 26     95


 


9/9/20 23:00  59 26 114/49 (70) 94   


 


9/9/20 22:00  64 26 93/50 (64) 93   


 


9/9/20 21:00  58 26 98/53 (68) 92   


 


9/9/20 20:00      Mechanical Ventilator  





      Mechanical Ventilator  


 


9/9/20 20:00 98.9 62 26 107/50 (69) 91   


 


9/9/20 20:00  60      


 


9/9/20 20:00        95


 


9/9/20 19:30  62 26     95


 


9/9/20 19:00  54 26 101/51 (68) 93   


 


9/9/20 18:00  54 26 109/50 (69) 91   


 


9/9/20 17:00  57 26 97/47 (64) 94   


 


9/9/20 16:00  65      


 


9/9/20 16:00 98.8 59 26 121/58 (79) 94   


 


9/9/20 16:00        90


 


9/9/20 16:00      Mechanical Ventilator  





      Mechanical Ventilator  


 


9/9/20 15:25  67 26     90


 


9/9/20 15:00  61 25 128/60 (82) 94   


 


9/9/20 14:00  58 25 107/67 (80) 93   


 


9/9/20 13:00  58 26 108/58 (75) 92   


 


9/9/20 12:00 98.0 55 26 105/56 (72) 94   


 


9/9/20 12:00      Mechanical Ventilator  





      Mechanical Ventilator  


 


9/9/20 12:00  55      


 


9/9/20 12:00        90








Status:  sedated


Condition:  critical


HEENT:  atraumatic, normocephalic


Lungs:  clear, chest wall tender


Heart:  HR/BP stable


Abdomen:  soft, non-tender


Extremities:  edema


Decubiti:  location


Micro:





Microbiology








 Date/Time


Source Procedure


Growth Status


 


 


 9/8/20 13:11


Blood Blood Culture - Preliminary


NO GROWTH AFTER 24 HOURS Resulted





 9/7/20 14:00


Sputum Gram Stain - Final Resulted


 


 9/7/20 14:00 Sputum Culture - Preliminary


Yeast Species Resulted


 


 9/7/20 14:00


Indwelling Cath Urine Culture - Final


NO GROWTH AFTER 48 HOURS Complete








Accucheck:  131





Critical Care - Subjective


ROS Limited/Unobtainable:  Yes


Condition:  critical


EKG Rhythm:  Sinus Rhythm


FI02:  95


Vent Support Breath Rate:  26


Vent Support Mode:  AC


Vent Tidal Volume:  500


Sputum Amount:  Small


PEEP:  10.0


PIP:  54


Tube Feeding Amount:  60


I&O:











Intake and Output  


 


 9/9/20 9/10/20





 19:00 07:00


 


Intake Total 1175 ml 1314 ml


 


Output Total 680 ml 670 ml


 


Balance 495 ml 644 ml


 


  


 


Free Water 130 ml 150 ml


 


IV Total 555 ml 334 ml


 


Tube Feeding 490 ml 770 ml


 


Other  60 ml


 


Output Urine Total 680 ml 670 ml


 


# Bowel Movements 2 4








CXR:


extensive infiltrate


ET-Tube:  7.5


ET Position:  22


Labs:





Laboratory Tests








Test


  9/9/20


13:55 9/10/20


04:30 9/10/20


08:30


 


Urine Color Yellow    


 


Urine Appearance Clear    


 


Urine pH 6 (4.5-8.0)    


 


Urine Specific Gravity


  1.015


(1.005-1.035) 


  


 


 


Urine Protein


  2+ (NEGATIVE)


H 


  


 


 


Urine Glucose (UA)


  Negative


(NEGATIVE) 


  


 


 


Urine Ketones


  Negative


(NEGATIVE) 


  


 


 


Urine Blood


  5+ (NEGATIVE)


H 


  


 


 


Urine Nitrite


  Negative


(NEGATIVE) 


  


 


 


Urine Bilirubin


  Negative


(NEGATIVE) 


  


 


 


Urine Urobilinogen


  Normal MG/DL


(0.0-1.0) 


  


 


 


Urine Leukocyte Esterase


  Negative


(NEGATIVE) 


  


 


 


Urine RBC


  15-20 /HPF (0


- 2)  H 


  


 


 


Urine WBC


  0-2 /HPF (0 -


2) 


  


 


 


Urine Squamous Epithelial


Cells Occasional


/LPF 


  


 


 


Urine Bacteria


  Few /HPF


(NONE) 


  


 


 


White Blood Count


  


  20.4 K/UL


(4.8-10.8)  H 


 


 


Red Blood Count


  


  2.79 M/UL


(4.20-5.40)  L 


 


 


Hemoglobin


  


  9.1 G/DL


(12.0-16.0)  L 


 


 


Hematocrit


  


  27.0 %


(37.0-47.0)  L 


 


 


Mean Corpuscular Volume  97 FL (80-99)   


 


Mean Corpuscular Hemoglobin


  


  32.6 PG


(27.0-31.0)  H 


 


 


Mean Corpuscular Hemoglobin


Concent 


  33.7 G/DL


(32.0-36.0) 


 


 


Red Cell Distribution Width


  


  14.8 %


(11.6-14.8) 


 


 


Platelet Count


  


  161 K/UL


(150-450) 


 


 


Mean Platelet Volume


  


  9.1 FL


(6.5-10.1) 


 


 


Neutrophils (%) (Auto)


  


  % (45.0-75.0)


  


 


 


Lymphocytes (%) (Auto)


  


  % (20.0-45.0)


  


 


 


Monocytes (%) (Auto)   % (1.0-10.0)   


 


Eosinophils (%) (Auto)   % (0.0-3.0)   


 


Basophils (%) (Auto)   % (0.0-2.0)   


 


Differential Total Cells


Counted 


  100  


  


 


 


Neutrophils % (Manual)  93 % (45-75)  H 


 


Lymphocytes % (Manual)  5 % (20-45)  L 


 


Monocytes % (Manual)  2 % (1-10)   


 


Eosinophils % (Manual)  0 % (0-3)   


 


Basophils % (Manual)  0 % (0-2)   


 


Band Neutrophils  0 % (0-8)   


 


Platelet Estimate  Adequate   


 


Platelet Morphology  Normal   


 


Hypochromasia  1+   


 


Anisocytosis  1+   


 


Macrocytosis  1+   


 


Sodium Level


  


  151 MMOL/L


(136-145)  H 


 


 


Potassium Level


  


  3.5 MMOL/L


(3.5-5.1) 


 


 


Chloride Level


  


  119 MMOL/L


()  H 


 


 


Carbon Dioxide Level


  


  27 MMOL/L


(21-32) 


 


 


Anion Gap


  


  5 mmol/L


(5-15) 


 


 


Blood Urea Nitrogen


  


  41 mg/dL


(7-18)  H 


 


 


Creatinine


  


  1.0 MG/DL


(0.55-1.30) 


 


 


Estimat Glomerular Filtration


Rate 


  56.9 mL/min


(>60) 


 


 


Glucose Level


  


  143 MG/DL


()  H 


 


 


Calcium Level


  


  7.5 MG/DL


(8.5-10.1)  L 


 


 


Phosphorus Level


  


  3.0 MG/DL


(2.5-4.9) 


 


 


Magnesium Level


  


  2.0 MG/DL


(1.8-2.4) 


 


 


Total Bilirubin


  


  0.4 MG/DL


(0.2-1.0) 


 


 


Aspartate Amino Transf


(AST/SGOT) 


  60 U/L (15-37)


H 


 


 


Alanine Aminotransferase


(ALT/SGPT) 


  155 U/L


(12-78)  H 


 


 


Alkaline Phosphatase


  


  63 U/L


() 


 


 


Total Protein


  


  4.7 G/DL


(6.4-8.2)  L 


 


 


Albumin


  


  1.2 G/DL


(3.4-5.0)  L 


 


 


Globulin  3.5 g/dL   


 


Albumin/Globulin Ratio


  


  0.3 (1.0-2.7)


L 


 


 


Arterial Blood pH


  


  


  7.483


(7.350-7.450)


 


Arterial Blood Partial


Pressure CO2 


  


  30.1 mmHg


(35.0-45.0)  L


 


Arterial Blood Partial


Pressure O2 


  


  63.4 mmHg


(75.0-100.0)  L


 


Arterial Blood HCO3


  


  


  22.1 mmol/L


(22.0-26.0)


 


Arterial Blood Oxygen


Saturation 


  


  91.9 %


()  L


 


Arterial Blood Base Excess   -0.8 (-2-2)  


 


Cole Test   Positive  

















Chano Cherry MD Sep 10, 2020 11:45

## 2020-09-10 NOTE — INFECTIOUS DISEASES PROG NOTE
Assessment/Plan








ASSESSMENT:


sp code blue 8/26





Septic Shock; SP


Fever, recurrent


Leukocytosis; recurrent; persistent


   -9/9 u/a no pyuria


   -9/8 Bcx NTD (Picc line)


   -9/6 Bcx NTD


            ucx Neg


             sp cx yeast (prelim)


  -8/26 u/a no pyuria





Pneumonia.- COVID 19 neg x3


   Acute hypoxic resp failure on VM> NRB 15l 100%; hypoxic on ABG> now VDRF 8/26

- Fio2 80% >100% 8/28> 60% 9/1 >80% 9/2   >95% 9/10


       -9/5 CXR:Small bilateral pleural effusions with minor edema.  Stable 

edema with  mild worsening in the degree of pleural effusion on the right.


         -9/1 sp cx Neg


         8/31 CXR: Extensive bilateral interstitial and airspace disease 

appears similar to the prior exam. Moderate to large bilateral pleural 

effusions appear unchanged.


   8/28 CXR: Increasing left upper lobe dense consolidation and likely 

increasing bilateral pleural fluid. Persistent diffuse dense consolidation 

elsewhere


            -8/26 sp cx normal resp lilliam


             -8/25 CXR: Increased atelectasis of the right lung, since prior 

exam of 3 days earlier. New or increased right pleural effusion. Increased left 

basilar consolidation and/or pleural fluid 


          -COVID Rapid PCR neg 8/23, 8/23, 8/26


          -8/22 spc x Group G strep


          -8/22 CXR:   Reduced lung volumes.  Patchy bilateral predominantly 

interstitial  pulmonary opacities.  Could be from edema and/or pneumonia.  

There is a broader differential.


 


        -legionella ag urine, blasto ab, Histo ab, HIV ab screen, JOY, ANCA neg





Persistent, high grade bacteremia-


     -8/22 Bcx 4/4 sets S. haemolyticus; 8/23 Bcx 3/4 S/ epi; 8/27 Bcx 1.4 S. 

warnerri; 8/29 Bcx Neg


     -2d echo: no vegetaions seen





          ua/ wbc 10-15, nit neg, leuk +1; ucx Neg





DAVID; 


 -supratherapeutic vanco levels





-Seizure disorder.


- Hypothyroidism.


- Down syndrome.





History of PEG tube placement.


[facility] resident





PLAN:





-f/u repeat blood Cx sputum and Urine Cx





Cont Meropenem#15/14 (abx d #18-21) given resp decompensation


Empiric Amikacin #3 pending repeat cultures





          9/4/20 SP Daptomycin #11


          9/2 SP MIcafungin #7, Linezolid #5


   8/28 SP Azithromycin #7/7


   8/26 SP Ceftriaxone #2


   8/25 SP IV Vancomycin #4, Zosyn #4


   8/22 SP Cefepime x1, Flagyl x1





- Monitor CBC, BMP.


.f/u  Repeat cx


- COVID neg x3


- Monitor chest x-ray.


- Monitor the patient's clinical course and labs.  Based on those, we will do 

further recommendation.


-f/u Bcx from picc line, cdiff if diarrhea


-Fungitell, asp ag, flow cytometry


-Once stable, CT chest/abd/pw/ given persistent leukocytosis





Thank you, Dr. Cherry, for allowing me to participate in the care of


this patient.  I will follow the patient with you at this


hospitalization.








Discussed with RN





Subjective


Allergies:  


Coded Allergies:  


     No Known Allergies (Unverified , 1/8/19)


afebrile >48hrs


off pressors 


Fio2 95%


wbc slightly improved but still on the 20s





Objective





Last 24 Hour Vital Signs








  Date Time  Temp Pulse Resp B/P (MAP) Pulse Ox O2 Delivery O2 Flow Rate FiO2


 


9/10/20 12:04      Mechanical Ventilator  





      Mechanical Ventilator  


 


9/10/20 12:00 98.9 66 26 96/59 (71) 92   


 


9/10/20 12:00        95


 


9/10/20 11:00  68 25 113/66 (82) 92   


 


9/10/20 10:50  60 26     95


 


9/10/20 10:00  65 25 101/58 (72) 90   


 


9/10/20 09:00  66 26 113/67 (82) 94   


 


9/10/20 08:00  65      


 


9/10/20 08:00      Mechanical Ventilator  





      Mechanical Ventilator  


 


9/10/20 08:00        95


 


9/10/20 08:00 98.5 64 25 107/61 (76) 94   


 


9/10/20 07:00  69 25 107/72 (84) 94   


 


9/10/20 06:50  66 27     95


 


9/10/20 06:00  62 25 95/72 (80) 91   


 


9/10/20 05:00  68 23 98/51 (67) 92   


 


9/10/20 04:00      Mechanical Ventilator  





      Mechanical Ventilator  


 


9/10/20 04:00        95


 


9/10/20 04:00 98.0 67 25 101/52 (68) 91   


 


9/10/20 03:42  66      


 


9/10/20 03:17  61 26     95


 


9/10/20 03:00  52 24 127/62 (83) 93   


 


9/10/20 02:00  47 26 116/61 (79) 90   


 


9/10/20 01:00  48 26 110/66 (81) 93   


 


9/10/20 00:00  68      


 


9/10/20 00:00        95


 


9/10/20 00:00  75 25 117/57 (77) 93   


 


9/10/20 00:00      Mechanical Ventilator  





      Mechanical Ventilator  


 


9/9/20 23:05  58 26     95


 


9/9/20 23:00  59 26 114/49 (70) 94   


 


9/9/20 22:00  64 26 93/50 (64) 93   


 


9/9/20 21:00  58 26 98/53 (68) 92   


 


9/9/20 20:00      Mechanical Ventilator  





      Mechanical Ventilator  


 


9/9/20 20:00 98.9 62 26 107/50 (69) 91   


 


9/9/20 20:00  60      


 


9/9/20 20:00        95


 


9/9/20 19:30  62 26     95


 


9/9/20 19:00  54 26 101/51 (68) 93   


 


9/9/20 18:00  54 26 109/50 (69) 91   


 


9/9/20 17:00  57 26 97/47 (64) 94   


 


9/9/20 16:00  65      


 


9/9/20 16:00 98.8 59 26 121/58 (79) 94   


 


9/9/20 16:00        90


 


9/9/20 16:00      Mechanical Ventilator  





      Mechanical Ventilator  


 


9/9/20 15:25  67 26     90


 


9/9/20 15:00  61 25 128/60 (82) 94   


 


9/9/20 14:00  58 25 107/67 (80) 93   


 


9/9/20 13:00  58 26 108/58 (75) 92   








Height (Feet):  5


Height (Inches):  3.00


Weight (Pounds):  172


HEENT:  No pale conjunctivae.  No icterus. ETT in place


NECK:  No lymphadenopathy.


CHEST:  Coarse breathing sounds.


HEART:  S1 and S2.


ABDOMEN:  Soft.  PEG tube in place.


EXTREMITIES:  No cyanosis at this time .


SKIN: no rash





Microbiology








 Date/Time


Source Procedure


Growth Status


 


 


 9/8/20 13:11


Blood Blood Culture - Preliminary


NO GROWTH AFTER 24 HOURS Resulted





 9/7/20 14:00


Sputum Gram Stain - Final Resulted


 


 9/7/20 14:00 Sputum Culture - Preliminary


Yeast Species Resulted


 


 9/7/20 14:00


Indwelling Cath Urine Culture - Final


NO GROWTH AFTER 48 HOURS Complete











Laboratory Tests








Test


  9/9/20


13:55 9/10/20


04:30 9/10/20


08:30


 


Urine Color Yellow    


 


Urine Appearance Clear    


 


Urine pH 6 (4.5-8.0)    


 


Urine Specific Gravity


  1.015


(1.005-1.035) 


  


 


 


Urine Protein


  2+ (NEGATIVE)


H 


  


 


 


Urine Glucose (UA)


  Negative


(NEGATIVE) 


  


 


 


Urine Ketones


  Negative


(NEGATIVE) 


  


 


 


Urine Blood


  5+ (NEGATIVE)


H 


  


 


 


Urine Nitrite


  Negative


(NEGATIVE) 


  


 


 


Urine Bilirubin


  Negative


(NEGATIVE) 


  


 


 


Urine Urobilinogen


  Normal MG/DL


(0.0-1.0) 


  


 


 


Urine Leukocyte Esterase


  Negative


(NEGATIVE) 


  


 


 


Urine RBC


  15-20 /HPF (0


- 2)  H 


  


 


 


Urine WBC


  0-2 /HPF (0 -


2) 


  


 


 


Urine Squamous Epithelial


Cells Occasional


/LPF 


  


 


 


Urine Bacteria


  Few /HPF


(NONE) 


  


 


 


White Blood Count


  


  20.4 K/UL


(4.8-10.8)  H 


 


 


Red Blood Count


  


  2.79 M/UL


(4.20-5.40)  L 


 


 


Hemoglobin


  


  9.1 G/DL


(12.0-16.0)  L 


 


 


Hematocrit


  


  27.0 %


(37.0-47.0)  L 


 


 


Mean Corpuscular Volume  97 FL (80-99)   


 


Mean Corpuscular Hemoglobin


  


  32.6 PG


(27.0-31.0)  H 


 


 


Mean Corpuscular Hemoglobin


Concent 


  33.7 G/DL


(32.0-36.0) 


 


 


Red Cell Distribution Width


  


  14.8 %


(11.6-14.8) 


 


 


Platelet Count


  


  161 K/UL


(150-450) 


 


 


Mean Platelet Volume


  


  9.1 FL


(6.5-10.1) 


 


 


Neutrophils (%) (Auto)


  


  % (45.0-75.0)


  


 


 


Lymphocytes (%) (Auto)


  


  % (20.0-45.0)


  


 


 


Monocytes (%) (Auto)   % (1.0-10.0)   


 


Eosinophils (%) (Auto)   % (0.0-3.0)   


 


Basophils (%) (Auto)   % (0.0-2.0)   


 


Differential Total Cells


Counted 


  100  


  


 


 


Neutrophils % (Manual)  93 % (45-75)  H 


 


Lymphocytes % (Manual)  5 % (20-45)  L 


 


Monocytes % (Manual)  2 % (1-10)   


 


Eosinophils % (Manual)  0 % (0-3)   


 


Basophils % (Manual)  0 % (0-2)   


 


Band Neutrophils  0 % (0-8)   


 


Platelet Estimate  Adequate   


 


Platelet Morphology  Normal   


 


Hypochromasia  1+   


 


Anisocytosis  1+   


 


Macrocytosis  1+   


 


Sodium Level


  


  151 MMOL/L


(136-145)  H 


 


 


Potassium Level


  


  3.5 MMOL/L


(3.5-5.1) 


 


 


Chloride Level


  


  119 MMOL/L


()  H 


 


 


Carbon Dioxide Level


  


  27 MMOL/L


(21-32) 


 


 


Anion Gap


  


  5 mmol/L


(5-15) 


 


 


Blood Urea Nitrogen


  


  41 mg/dL


(7-18)  H 


 


 


Creatinine


  


  1.0 MG/DL


(0.55-1.30) 


 


 


Estimat Glomerular Filtration


Rate 


  56.9 mL/min


(>60) 


 


 


Glucose Level


  


  143 MG/DL


()  H 


 


 


Calcium Level


  


  7.5 MG/DL


(8.5-10.1)  L 


 


 


Phosphorus Level


  


  3.0 MG/DL


(2.5-4.9) 


 


 


Magnesium Level


  


  2.0 MG/DL


(1.8-2.4) 


 


 


Total Bilirubin


  


  0.4 MG/DL


(0.2-1.0) 


 


 


Aspartate Amino Transf


(AST/SGOT) 


  60 U/L (15-37)


H 


 


 


Alanine Aminotransferase


(ALT/SGPT) 


  155 U/L


(12-78)  H 


 


 


Alkaline Phosphatase


  


  63 U/L


() 


 


 


Total Protein


  


  4.7 G/DL


(6.4-8.2)  L 


 


 


Albumin


  


  1.2 G/DL


(3.4-5.0)  L 


 


 


Globulin  3.5 g/dL   


 


Albumin/Globulin Ratio


  


  0.3 (1.0-2.7)


L 


 


 


Arterial Blood pH


  


  


  7.483


(7.350-7.450)


 


Arterial Blood Partial


Pressure CO2 


  


  30.1 mmHg


(35.0-45.0)  L


 


Arterial Blood Partial


Pressure O2 


  


  63.4 mmHg


(75.0-100.0)  L


 


Arterial Blood HCO3


  


  


  22.1 mmol/L


(22.0-26.0)


 


Arterial Blood Oxygen


Saturation 


  


  91.9 %


()  L


 


Arterial Blood Base Excess   -0.8 (-2-2)  


 


Cole Test   Positive  











Current Medications








 Medications


  (Trade)  Dose


 Ordered  Sig/Didi


 Route


 PRN Reason  Start Time


 Stop Time Status Last Admin


Dose Admin


 


 Acetaminophen


  (Tylenol)  650 mg  Q4H  PRN


 GT


 FEVER  8/26/20 11:45


 9/25/20 11:44  9/7/20 03:17


 


 


 Amikacin Protocol


  (Amikacin


 pharmacy to dose)  1 ea  DAILY  PRN


 MISC


 Per rx protocol  9/8/20 12:00


 10/8/20 11:59   


 


 


 Amikacin Sulfate


 1000 mg/Sodium


 Chloride  114 ml @ 


 114 mls/hr  Q36H


 IV


   9/8/20 14:00


 9/15/20 13:59  9/10/20 01:31


 


 


 Chlorhexidine


 Gluconate


  (Beatrice-Hex 2%)  1 applic  DAILY@2000


 TOPIC


   8/31/20 20:00


 11/29/20 19:59  9/9/20 20:44


 


 


 Clotrimazole


  (Lotrimin)  1 applic  Q12HR


 TOPIC


   8/30/20 13:00


 11/28/20 12:59  9/10/20 08:59


 


 


 Dextrose


  (Dextrose 50%)  25 ml  Q30M  PRN


 IV


 Hypoglycemia  8/26/20 11:30


 11/20/20 11:29   


 


 


 Dextrose


  (Dextrose 50%)  50 ml  Q30M  PRN


 IV


 Hypoglycemia  8/26/20 11:30


 11/20/20 11:29   


 


 


 Dopamine HCl/


 Dextrose  250 ml @ 0


 mls/hr  Q24H


 IV


   9/4/20 11:15


 12/3/20 11:14  9/6/20 19:47


 


 


 Hydrocortisone


  (Solu-CORTEF)  100 mg  EVERY 8  HOURS


 IV


   8/30/20 14:00


 11/28/20 13:59  9/10/20 05:59


 


 


 Levothyroxine


 Sodium


  (Synthroid)  75 mcg  DAILY


 GT


   8/27/20 09:00


 9/22/20 08:59  9/10/20 08:59


 


 


 Lorazepam


  (Ativan 2mg/ml


 1ml)  2 mg  Q2H  PRN


 IV


 For Anxiety  9/4/20 17:30


 9/11/20 17:29  9/4/20 17:37


 


 


 Meropenem 1 gm/


 Sodium Chloride  55 ml @ 


 110 mls/hr  Q8H


 IVPB


   9/6/20 12:00


 9/11/20 11:59  9/10/20 11:55


 


 


 Midodrine


  (Pro-Amatine)  10 mg  Q8HR


 GT


   8/28/20 14:00


 11/26/20 13:59  9/10/20 05:59


 


 


 Norepinephrine


 Bitartrate 16 mg/


 Dextrose  500 ml @ 0


 mls/hr  Q24H


 IV


   8/31/20 07:45


 9/29/20 12:59  9/4/20 08:43


 


 


 Ondansetron HCl


  (Zofran)  4 mg  Q6H  PRN


 IVP


 Nausea & Vomiting  8/26/20 12:00


 9/21/20 11:59   


 


 


 Pantoprazole


  (Protonix)  40 mg  DAILY


 IV


   8/27/20 09:00


 9/22/20 08:59  9/10/20 08:59


 


 


 Phenylephrine HCl


 50 mg/Dextrose  250 ml @ 0


 mls/hr  Q24H  PRN


 IV


 For hypotension  8/29/20 17:45


 9/28/20 17:44  8/31/20 01:49


 


 


 Polyethylene


 Glycol


  (Miralax)  17 gm  DAILYPRN  PRN


 GT


 Constipation  8/26/20 12:00


 9/21/20 11:59   


 


 


 Valproic Acid


  (Depakene)  500 mg  EVERY 8  HOURS


 GT


   8/26/20 14:00


 9/21/20 21:59  9/10/20 05:59


 

















Rema Gomes M.D. Sep 10, 2020 12:30

## 2020-09-10 NOTE — NUR
NURSE NOTES:

Report received from Nereyda Jin RN.Pt resting quietly in bed awake,with flat affect, 
Down Syndrome noted no resp distress,orally intubated,ETT 7.5,Lip line 22cm, hooked to 
vent,ordered vent settings tolerated ,GTF Vital AF 1.2 at 60 ml/hr no residual noted,Valle 
cath draining yellow urine with adequate amount,skin warm,dry and edematous,IV site to CARLOS 
PICC line intact ,SR up x2 HOB elevated,bed lock in lowest position,will continue with plans 
of care.

## 2020-09-10 NOTE — NUR
NURSE HAND-OFF REPORT: 



Latest Vital Signs: Temperature 98.9 , Pulse 63 , B/P 108 /58 , Respiratory Rate 26 , O2 SAT 
92 , Mechanical Ventilator, O2 Flow Rate 15.0 .  

Vital Sign Comment: stable



EKG Rhythm: Sinus Bradycardia

Rhythm change?: N 

MD Notified?: -

MD Response: 



Latest Momin Fall Score: 50  

Fall Risk: High Risk 

Safety Measures: Call light Within Reach, Bed Alarm Zone 3, Side Rails Side Rails x2, Bed 
position Low and Locked.

Fall Precautions: 

Yellow Socks

Yellow Gown

Door Sign

Patient Fall Education



Report given to .Nereyda Jin RN.

## 2020-09-11 VITALS — DIASTOLIC BLOOD PRESSURE: 58 MMHG | SYSTOLIC BLOOD PRESSURE: 104 MMHG

## 2020-09-11 VITALS — DIASTOLIC BLOOD PRESSURE: 63 MMHG | SYSTOLIC BLOOD PRESSURE: 119 MMHG

## 2020-09-11 VITALS — DIASTOLIC BLOOD PRESSURE: 55 MMHG | SYSTOLIC BLOOD PRESSURE: 109 MMHG

## 2020-09-11 VITALS — SYSTOLIC BLOOD PRESSURE: 99 MMHG | DIASTOLIC BLOOD PRESSURE: 48 MMHG

## 2020-09-11 VITALS — DIASTOLIC BLOOD PRESSURE: 63 MMHG | SYSTOLIC BLOOD PRESSURE: 125 MMHG

## 2020-09-11 VITALS — DIASTOLIC BLOOD PRESSURE: 60 MMHG | SYSTOLIC BLOOD PRESSURE: 119 MMHG

## 2020-09-11 VITALS — DIASTOLIC BLOOD PRESSURE: 66 MMHG | SYSTOLIC BLOOD PRESSURE: 103 MMHG

## 2020-09-11 VITALS — DIASTOLIC BLOOD PRESSURE: 55 MMHG | SYSTOLIC BLOOD PRESSURE: 97 MMHG

## 2020-09-11 VITALS — DIASTOLIC BLOOD PRESSURE: 52 MMHG | SYSTOLIC BLOOD PRESSURE: 103 MMHG

## 2020-09-11 VITALS — DIASTOLIC BLOOD PRESSURE: 57 MMHG | SYSTOLIC BLOOD PRESSURE: 112 MMHG

## 2020-09-11 VITALS — DIASTOLIC BLOOD PRESSURE: 64 MMHG | SYSTOLIC BLOOD PRESSURE: 107 MMHG

## 2020-09-11 VITALS — DIASTOLIC BLOOD PRESSURE: 58 MMHG | SYSTOLIC BLOOD PRESSURE: 108 MMHG

## 2020-09-11 VITALS — SYSTOLIC BLOOD PRESSURE: 113 MMHG | DIASTOLIC BLOOD PRESSURE: 55 MMHG

## 2020-09-11 VITALS — DIASTOLIC BLOOD PRESSURE: 86 MMHG | SYSTOLIC BLOOD PRESSURE: 99 MMHG

## 2020-09-11 VITALS — SYSTOLIC BLOOD PRESSURE: 96 MMHG | DIASTOLIC BLOOD PRESSURE: 53 MMHG

## 2020-09-11 VITALS — SYSTOLIC BLOOD PRESSURE: 99 MMHG | DIASTOLIC BLOOD PRESSURE: 52 MMHG

## 2020-09-11 VITALS — SYSTOLIC BLOOD PRESSURE: 105 MMHG | DIASTOLIC BLOOD PRESSURE: 57 MMHG

## 2020-09-11 VITALS — SYSTOLIC BLOOD PRESSURE: 92 MMHG | DIASTOLIC BLOOD PRESSURE: 50 MMHG

## 2020-09-11 VITALS — DIASTOLIC BLOOD PRESSURE: 53 MMHG | SYSTOLIC BLOOD PRESSURE: 114 MMHG

## 2020-09-11 VITALS — SYSTOLIC BLOOD PRESSURE: 93 MMHG | DIASTOLIC BLOOD PRESSURE: 55 MMHG

## 2020-09-11 VITALS — SYSTOLIC BLOOD PRESSURE: 98 MMHG | DIASTOLIC BLOOD PRESSURE: 73 MMHG

## 2020-09-11 VITALS — SYSTOLIC BLOOD PRESSURE: 114 MMHG | DIASTOLIC BLOOD PRESSURE: 57 MMHG

## 2020-09-11 VITALS — DIASTOLIC BLOOD PRESSURE: 58 MMHG | SYSTOLIC BLOOD PRESSURE: 114 MMHG

## 2020-09-11 LAB
ADD MANUAL DIFF: YES
ANION GAP SERPL CALC-SCNC: 8 MMOL/L (ref 5–15)
BUN SERPL-MCNC: 38 MG/DL (ref 7–18)
CALCIUM SERPL-MCNC: 7.7 MG/DL (ref 8.5–10.1)
CHLORIDE SERPL-SCNC: 117 MMOL/L (ref 98–107)
CO2 SERPL-SCNC: 27 MMOL/L (ref 21–32)
CREAT SERPL-MCNC: 0.9 MG/DL (ref 0.55–1.3)
ERYTHROCYTE [DISTWIDTH] IN BLOOD BY AUTOMATED COUNT: 14.7 % (ref 11.6–14.8)
HCT VFR BLD CALC: 25.4 % (ref 37–47)
HGB BLD-MCNC: 8.5 G/DL (ref 12–16)
MCV RBC AUTO: 96 FL (ref 80–99)
PLATELET # BLD: 161 K/UL (ref 150–450)
POTASSIUM SERPL-SCNC: 3.1 MMOL/L (ref 3.5–5.1)
RBC # BLD AUTO: 2.64 M/UL (ref 4.2–5.4)
SODIUM SERPL-SCNC: 152 MMOL/L (ref 136–145)
WBC # BLD AUTO: 19.2 K/UL (ref 4.8–10.8)

## 2020-09-11 RX ADMIN — HYDROCORTISONE SODIUM SUCCINATE SCH MG: 100 INJECTION, POWDER, FOR SOLUTION INTRAMUSCULAR; INTRAVENOUS at 21:38

## 2020-09-11 RX ADMIN — MEROPENEM SCH MLS/HR: 1 INJECTION INTRAVENOUS at 19:58

## 2020-09-11 RX ADMIN — VALPROIC ACID SCH MG: 250 SOLUTION ORAL at 15:22

## 2020-09-11 RX ADMIN — VALPROIC ACID SCH MG: 250 SOLUTION ORAL at 21:38

## 2020-09-11 RX ADMIN — HYDROCORTISONE SODIUM SUCCINATE SCH MG: 100 INJECTION, POWDER, FOR SOLUTION INTRAMUSCULAR; INTRAVENOUS at 05:37

## 2020-09-11 RX ADMIN — AMIKACIN SULFATE SCH MLS/HR: 500 INJECTION, SOLUTION INTRAMUSCULAR; INTRAVENOUS at 15:23

## 2020-09-11 RX ADMIN — MEROPENEM SCH MLS/HR: 1 INJECTION INTRAVENOUS at 04:28

## 2020-09-11 RX ADMIN — DOPAMINE HYDROCHLORIDE IN DEXTROSE SCH MLS/HR: 1.6 INJECTION, SOLUTION INTRAVENOUS at 11:15

## 2020-09-11 RX ADMIN — MIDODRINE HYDROCHLORIDE SCH MG: 10 TABLET ORAL at 21:38

## 2020-09-11 RX ADMIN — MIDODRINE HYDROCHLORIDE SCH MG: 10 TABLET ORAL at 05:33

## 2020-09-11 RX ADMIN — VALPROIC ACID SCH MG: 250 SOLUTION ORAL at 05:33

## 2020-09-11 RX ADMIN — MEROPENEM SCH MLS/HR: 1 INJECTION INTRAVENOUS at 12:10

## 2020-09-11 RX ADMIN — MIDODRINE HYDROCHLORIDE SCH MG: 10 TABLET ORAL at 15:22

## 2020-09-11 RX ADMIN — HYDROCORTISONE SODIUM SUCCINATE SCH MG: 100 INJECTION, POWDER, FOR SOLUTION INTRAMUSCULAR; INTRAVENOUS at 15:21

## 2020-09-11 RX ADMIN — CHLORHEXIDINE GLUCONATE SCH APPLIC: 213 SOLUTION TOPICAL at 19:57

## 2020-09-11 RX ADMIN — PANTOPRAZOLE SODIUM SCH MG: 40 INJECTION, POWDER, FOR SOLUTION INTRAVENOUS at 08:26

## 2020-09-11 RX ADMIN — SODIUM CHLORIDE SCH MLS/HR: 900 INJECTION, SOLUTION INTRAVENOUS at 07:45

## 2020-09-11 NOTE — INFECTIOUS DISEASES PROG NOTE
Assessment/Plan








ASSESSMENT:


sp code blue 8/26





Septic Shock; SP


Fever, recurrent


Leukocytosis; recurrent; persistent


   -9/9 u/a no pyuria


   -9/8 Bcx NTD (Picc line)


   -9/6 Bcx NTD


            ucx Neg


             sp cx yeast (prelim)


  -8/26 u/a no pyuria





Pneumonia.- COVID 19 neg x3


   Acute hypoxic resp failure on VM> NRB 15l 100%; hypoxic on ABG> now VDRF 8/26

- Fio2 80% >100% 8/28> 60% 9/1 >80% 9/2   >95% 9/10


       -9/5 CXR:Small bilateral pleural effusions with minor edema.  Stable 

edema with  mild worsening in the degree of pleural effusion on the right.


         -9/1 sp cx Neg


         8/31 CXR: Extensive bilateral interstitial and airspace disease 

appears similar to the prior exam. Moderate to large bilateral pleural 

effusions appear unchanged.


   8/28 CXR: Increasing left upper lobe dense consolidation and likely 

increasing bilateral pleural fluid. Persistent diffuse dense consolidation 

elsewhere


            -8/26 sp cx normal resp lilliam


             -8/25 CXR: Increased atelectasis of the right lung, since prior 

exam of 3 days earlier. New or increased right pleural effusion. Increased left 

basilar consolidation and/or pleural fluid 


          -COVID Rapid PCR neg 8/23, 8/23, 8/26


          -8/22 spc x Group G strep


          -8/22 CXR:   Reduced lung volumes.  Patchy bilateral predominantly 

interstitial  pulmonary opacities.  Could be from edema and/or pneumonia.  

There is a broader differential.


 


        -legionella ag urine, blasto ab, Histo ab, HIV ab screen, JOY, ANCA neg





Persistent, high grade bacteremia-


     -8/22 Bcx 4/4 sets S. haemolyticus; 8/23 Bcx 3/4 S/ epi; 8/27 Bcx 1.4 S. 

warnerri; 8/29 Bcx Neg


     -2d echo: no vegetaions seen





          ua/ wbc 10-15, nit neg, leuk +1; ucx Neg





DAVID; 


 -supratherapeutic vanco levels





-Seizure disorder.


- Hypothyroidism.


- Down syndrome.





History of PEG tube placement.


[facility]





PLAN:





-f/u repeat blood Cx sputum and Urine Cx





Cont Meropenem#15(abx d #20/21) given resp decompensation


Empiric Amikacin #4 pending repeat cultures





          9/4/20 SP Daptomycin #11


          9/2 SP MIcafungin #7, Linezolid #5


   8/28 SP Azithromycin #7/7


   8/26 SP Ceftriaxone #2


   8/25 SP IV Vancomycin #4, Zosyn #4


   8/22 SP Cefepime x1, Flagyl x1





- Monitor CBC, BMP.


.f/u  Repeat cx


- COVID neg x3


- Monitor chest x-ray.


- Monitor the patient's clinical course and labs.  Based on those, we will do 

further recommendation.


-f/u Bcx from picc line, cdiff if diarrhea


-f/u Fungitell, asp ag, flow cytometry


-Once stable, CT chest/abd/pw/ given persistent leukocytosis





Thank you, Dr. Cherry, for allowing me to participate in the care of


this patient.  I will follow the patient with you at this


hospitalization.








Discussed with RN





Subjective


Allergies:  


Coded Allergies:  


     No Known Allergies (Unverified , 1/8/19)


afebrile >72hrs


off pressors 


Fio2 95%


wbc improving slightly


bcx NTD





Objective





Last 24 Hour Vital Signs








  Date Time  Temp Pulse Resp B/P (MAP) Pulse Ox O2 Delivery O2 Flow Rate FiO2


 


9/11/20 12:13        95


 


9/11/20 12:11 99.0 67 26 105/57 (73) 99   


 


9/11/20 12:00      Mechanical Ventilator  





      Mechanical Ventilator  


 


9/11/20 12:00  60      


 


9/11/20 11:00  65 26 99/48 (65) 98   


 


9/11/20 10:54  55 26     95


 


9/11/20 10:00  59 23 114/57 (76) 98   


 


9/11/20 09:00 98.0 57 17 104/58 (73) 99   


 


9/11/20 08:05        95


 


9/11/20 08:00      Mechanical Ventilator  





      Mechanical Ventilator  


 


9/11/20 08:00 98.9 85 25 107/64 (78) 100   


 


9/11/20 08:00  63      


 


9/11/20 07:45    104/58    


 


9/11/20 07:15  55 26     95


 


9/11/20 07:00  61 24 114/58 (76) 98   


 


9/11/20 06:00  65 23 108/58 (75) 97   


 


9/11/20 05:00  68 17 112/57 (75) 96   


 


9/11/20 04:00 98.9 64 20 97/55 (69) 97   


 


9/11/20 04:00      Mechanical Ventilator  





      Mechanical Ventilator  


 


9/11/20 04:00        95


 


9/11/20 03:55  63      


 


9/11/20 03:00  71 23 114/53 (73) 95   


 


9/11/20 02:59  74 26     95


 


9/11/20 02:00  73 25 103/66 (78) 96   


 


9/11/20 01:00  76 25 98/73 (81) 97   


 


9/11/20 00:00  73 24 113/55 (74) 96   


 


9/11/20 00:00      Mechanical Ventilator  





      Mechanical Ventilator  


 


9/11/20 00:00        95


 


9/11/20 00:00  66      


 


9/10/20 23:00  69 24 115/52 (73) 97   


 


9/10/20 22:56  68 26     95


 


9/10/20 22:00  72 24 114/60 (78) 97   


 


9/10/20 21:00  66 24 105/58 (74) 94   


 


9/10/20 20:00      Mechanical Ventilator  





      Mechanical Ventilator  


 


9/10/20 20:00 98.9 59 25 115/56 (75) 92   


 


9/10/20 20:00  71      


 


9/10/20 20:00        95


 


9/10/20 19:05  63 26     95


 


9/10/20 19:00  58 26 108/58 (75) 92   


 


9/10/20 18:05  57 26 119/47 (71) 94   


 


9/10/20 17:00  65 26 106/45 (65) 97   


 


9/10/20 17:00      Mechanical Ventilator  





      Mechanical Ventilator  


 


9/10/20 16:00 98.9 68 26 118/67 (84) 93   


 


9/10/20 16:00  66      


 


9/10/20 16:00      Mechanical Ventilator  





      Mechanical Ventilator  


 


9/10/20 16:00        95


 


9/10/20 15:20  69 26     95


 


9/10/20 15:00  67 28 122/62 (82) 91   


 


9/10/20 14:00  60 26 98/55 (69) 91   


 


9/10/20 13:00  56 25 98/55 (69) 90   








Height (Feet):  5


Height (Inches):  3.00


Weight (Pounds):  172


HEENT:  No pale conjunctivae.  No icterus. ETT in place


NECK:  No lymphadenopathy.


CHEST:  Coarse breathing sounds.


HEART:  S1 and S2.


ABDOMEN:  Soft.  PEG tube in place.


EXTREMITIES:  No cyanosis at this time .


SKIN: no rash





Microbiology








 Date/Time


Source Procedure


Growth Status


 


 


 9/8/20 13:11


Blood Blood Culture - Preliminary


NO GROWTH AFTER 48 HOURS Resulted











Laboratory Tests








Test


  9/11/20


04:25 9/11/20


10:55


 


White Blood Count


  19.2 K/UL


(4.8-10.8)  H 


 


 


Red Blood Count


  2.64 M/UL


(4.20-5.40)  L 


 


 


Hemoglobin


  8.5 G/DL


(12.0-16.0)  L 


 


 


Hematocrit


  25.4 %


(37.0-47.0)  L 


 


 


Mean Corpuscular Volume 96 FL (80-99)   


 


Mean Corpuscular Hemoglobin


  32.4 PG


(27.0-31.0)  H 


 


 


Mean Corpuscular Hemoglobin


Concent 33.6 G/DL


(32.0-36.0) 


 


 


Red Cell Distribution Width


  14.7 %


(11.6-14.8) 


 


 


Platelet Count


  161 K/UL


(150-450) 


 


 


Mean Platelet Volume


  8.7 FL


(6.5-10.1) 


 


 


Neutrophils (%) (Auto)


  % (45.0-75.0)


  


 


 


Lymphocytes (%) (Auto)


  % (20.0-45.0)


  


 


 


Monocytes (%) (Auto)  % (1.0-10.0)   


 


Eosinophils (%) (Auto)  % (0.0-3.0)   


 


Basophils (%) (Auto)  % (0.0-2.0)   


 


Differential Total Cells


Counted 100  


  


 


 


Neutrophils % (Manual) 89 % (45-75)  H 


 


Lymphocytes % (Manual) 6 % (20-45)  L 


 


Monocytes % (Manual) 5 % (1-10)   


 


Eosinophils % (Manual) 0 % (0-3)   


 


Basophils % (Manual) 0 % (0-2)   


 


Band Neutrophils 0 % (0-8)   


 


Platelet Estimate Adequate   


 


Platelet Morphology Normal   


 


Hypochromasia 2+   


 


Anisocytosis 1+   


 


Sodium Level


  152 MMOL/L


(136-145)  H 


 


 


Potassium Level


  3.1 MMOL/L


(3.5-5.1)  L 


 


 


Chloride Level


  117 MMOL/L


()  H 


 


 


Carbon Dioxide Level


  27 MMOL/L


(21-32) 


 


 


Anion Gap


  8 mmol/L


(5-15) 


 


 


Blood Urea Nitrogen


  38 mg/dL


(7-18)  H 


 


 


Creatinine


  0.9 MG/DL


(0.55-1.30) 


 


 


Estimat Glomerular Filtration


Rate > 60 mL/min


(>60) 


 


 


Glucose Level


  177 MG/DL


()  H 


 


 


Calcium Level


  7.7 MG/DL


(8.5-10.1)  L 


 


 


Aspergillus galactomannan


Antigen 


  Pending  


 


 


Beta-(1,3)-D-Glucan  Pending  











Current Medications








 Medications


  (Trade)  Dose


 Ordered  Sig/Didi


 Route


 PRN Reason  Start Time


 Stop Time Status Last Admin


Dose Admin


 


 Acetaminophen


  (Tylenol)  650 mg  Q4H  PRN


 GT


 FEVER  8/26/20 11:45


 9/25/20 11:44  9/7/20 03:17


 


 


 Amikacin Protocol


  (Amikacin


 pharmacy to dose)  1 ea  DAILY  PRN


 MISC


 Per rx protocol  9/8/20 12:00


 10/8/20 11:59   


 


 


 Amikacin Sulfate


 1000 mg/Sodium


 Chloride  114 ml @ 


 114 mls/hr  Q36H


 IV


   9/8/20 14:00


 9/15/20 13:59  9/10/20 01:31


 


 


 Chlorhexidine


 Gluconate


  (Beatrice-Hex 2%)  1 applic  DAILY@2000


 TOPIC


   8/31/20 20:00


 11/29/20 19:59  9/10/20 20:45


 


 


 Clotrimazole


  (Lotrimin)  1 applic  Q12HR


 TOPIC


   8/30/20 13:00


 11/28/20 12:59  9/11/20 08:27


 


 


 Dextrose


  (Dextrose 50%)  25 ml  Q30M  PRN


 IV


 Hypoglycemia  8/26/20 11:30


 11/20/20 11:29   


 


 


 Dextrose


  (Dextrose 50%)  50 ml  Q30M  PRN


 IV


 Hypoglycemia  8/26/20 11:30


 11/20/20 11:29   


 


 


 Dopamine HCl/


 Dextrose  250 ml @ 0


 mls/hr  Q24H


 IV


   9/4/20 11:15


 12/3/20 11:14  9/6/20 19:47


 


 


 Hydrocortisone


  (Solu-CORTEF)  100 mg  EVERY 8  HOURS


 IV


   8/30/20 14:00


 11/28/20 13:59  9/11/20 05:37


 


 


 Levothyroxine


 Sodium


  (Synthroid)  75 mcg  DAILY


 GT


   8/27/20 09:00


 9/22/20 08:59  9/11/20 08:26


 


 


 Lorazepam


  (Ativan 2mg/ml


 1ml)  2 mg  Q2H  PRN


 IV


 For Anxiety  9/4/20 17:30


 9/11/20 17:29  9/4/20 17:37


 


 


 Meropenem 1 gm/


 Sodium Chloride  55 ml @ 


 110 mls/hr  Q8H


 IVPB


   9/6/20 12:00


 9/16/20 23:59  9/11/20 12:10


 


 


 Midodrine


  (Pro-Amatine)  10 mg  Q8HR


 GT


   8/28/20 14:00


 11/26/20 13:59  9/11/20 05:33


 


 


 Norepinephrine


 Bitartrate 16 mg/


 Dextrose  500 ml @ 0


 mls/hr  Q24H


 IV


   8/31/20 07:45


 9/29/20 12:59  9/4/20 08:43


 


 


 Ondansetron HCl


  (Zofran)  4 mg  Q6H  PRN


 IVP


 Nausea & Vomiting  8/26/20 12:00


 9/21/20 11:59   


 


 


 Pantoprazole


  (Protonix)  40 mg  DAILY


 IV


   8/27/20 09:00


 9/22/20 08:59  9/11/20 08:26


 


 


 Phenylephrine HCl


 50 mg/Dextrose  250 ml @ 0


 mls/hr  Q24H  PRN


 IV


 For hypotension  8/29/20 17:45


 9/28/20 17:44  8/31/20 01:49


 


 


 Polyethylene


 Glycol


  (Miralax)  17 gm  DAILYPRN  PRN


 GT


 Constipation  8/26/20 12:00


 9/21/20 11:59   


 


 


 Valproic Acid


  (Depakene)  500 mg  EVERY 8  HOURS


 GT


   8/26/20 14:00


 9/21/20 21:59  9/11/20 05:33


 

















Rema Gomes M.D. Sep 11, 2020 12:46

## 2020-09-11 NOTE — GENERAL PROGRESS NOTE
Assessment/Plan


Assessment/Plan:


1. History of Down syndrome.


2. Dysphagia with G-tube.


3. Seizure disorder.


4. Hypothyroidism.


5. DAVID.


6. Pneumonia.


7. Sepsis.








fu H&H


ppi


GTF


hold GI procedures for now





Subjective


ROS Limited/Unobtainable:  No


Allergies:  


Coded Allergies:  


     No Known Allergies (Unverified , 1/8/19)





Objective





Last 24 Hour Vital Signs








  Date Time  Temp Pulse Resp B/P (MAP) Pulse Ox O2 Delivery O2 Flow Rate FiO2


 


9/11/20 09:00 98.0 57 17 104/58 (73) 99   


 


9/11/20 08:05        95


 


9/11/20 08:00      Mechanical Ventilator  





      Mechanical Ventilator  


 


9/11/20 08:00 98.9 85 25 107/64 (78) 100   


 


9/11/20 08:00  63      


 


9/11/20 07:45    104/58    


 


9/11/20 07:15  55 26     95


 


9/11/20 07:00  61 24 114/58 (76) 98   


 


9/11/20 06:00  65 23 108/58 (75) 97   


 


9/11/20 05:00  68 17 112/57 (75) 96   


 


9/11/20 04:00 98.9 64 20 97/55 (69) 97   


 


9/11/20 04:00      Mechanical Ventilator  





      Mechanical Ventilator  


 


9/11/20 04:00        95


 


9/11/20 03:55  63      


 


9/11/20 03:00  71 23 114/53 (73) 95   


 


9/11/20 02:59  74 26     95


 


9/11/20 02:00  73 25 103/66 (78) 96   


 


9/11/20 01:00  76 25 98/73 (81) 97   


 


9/11/20 00:00  73 24 113/55 (74) 96   


 


9/11/20 00:00      Mechanical Ventilator  





      Mechanical Ventilator  


 


9/11/20 00:00        95


 


9/11/20 00:00  66      


 


9/10/20 23:00  69 24 115/52 (73) 97   


 


9/10/20 22:56  68 26     95


 


9/10/20 22:00  72 24 114/60 (78) 97   


 


9/10/20 21:00  66 24 105/58 (74) 94   


 


9/10/20 20:00      Mechanical Ventilator  





      Mechanical Ventilator  


 


9/10/20 20:00 98.9 59 25 115/56 (75) 92   


 


9/10/20 20:00  71      


 


9/10/20 20:00        95


 


9/10/20 19:05  63 26     95


 


9/10/20 19:00  58 26 108/58 (75) 92   


 


9/10/20 18:05  57 26 119/47 (71) 94   


 


9/10/20 17:00  65 26 106/45 (65) 97   


 


9/10/20 17:00      Mechanical Ventilator  





      Mechanical Ventilator  


 


9/10/20 16:00 98.9 68 26 118/67 (84) 93   


 


9/10/20 16:00  66      


 


9/10/20 16:00      Mechanical Ventilator  





      Mechanical Ventilator  


 


9/10/20 16:00        95


 


9/10/20 15:20  69 26     95


 


9/10/20 15:00  67 28 122/62 (82) 91   


 


9/10/20 14:00  60 26 98/55 (69) 91   


 


9/10/20 13:00  56 25 98/55 (69) 90   


 


9/10/20 12:04      Mechanical Ventilator  





      Mechanical Ventilator  


 


9/10/20 12:00  57      


 


9/10/20 12:00 98.9 66 26 96/59 (71) 92   


 


9/10/20 12:00        95


 


9/10/20 11:00  68 25 113/66 (82) 92   


 


9/10/20 10:50  60 26     95

















Intake and Output  


 


 9/10/20 9/11/20





 19:00 07:00


 


Intake Total 1000 ml 1160 ml


 


Output Total 710 ml 880 ml


 


Balance 290 ml 280 ml


 


  


 


Free Water 100 ml 100 ml


 


IV Total  220 ml


 


Tube Feeding 720 ml 720 ml


 


Other 180 ml 120 ml


 


Output Urine Total 710 ml 880 ml


 


# Bowel Movements 2 5








Laboratory Tests


9/11/20 04:25: 


White Blood Count 19.2H, Red Blood Count 2.64L, Hemoglobin 8.5L, Hematocrit 

25.4L, Mean Corpuscular Volume 96, Mean Corpuscular Hemoglobin 32.4H, Mean 

Corpuscular Hemoglobin Concent 33.6, Red Cell Distribution Width 14.7, Platelet 

Count 161, Mean Platelet Volume 8.7, Neutrophils (%) (Auto) , Lymphocytes (%) (

Auto) , Monocytes (%) (Auto) , Eosinophils (%) (Auto) , Basophils (%) (Auto) , 

Neutrophils % (Manual) [Pending], Lymphocytes % (Manual) [Pending], Platelet 

Estimate [Pending], Platelet Morphology [Pending], Sodium Level 152H, Potassium 

Level 3.1L, Chloride Level 117H, Carbon Dioxide Level 27, Anion Gap 8, Blood 

Urea Nitrogen 38H, Creatinine 0.9, Estimat Glomerular Filtration Rate > 60, 

Glucose Level 177H, Calcium Level 7.7L


Height (Feet):  5


Height (Inches):  3.00


Weight (Pounds):  172


General Appearance:  no apparent distress


EENT:  normal ENT inspection


Neck:  supple


Cardiovascular:  normal rate


Respiratory/Chest:  decreased breath sounds


Abdomen:  normal bowel sounds, non tender, soft


Extremities:  non-tender











Ted Nagy MD Sep 11, 2020 10:01

## 2020-09-11 NOTE — NUR
NURSE NOTES:

Patient in bed sleeping comfortable, no s/s of acute distress noted. No fever. Orally 
intubated  Fi02 90%O2 Sat 94% on the monitor. HOB elevated.G-tube intact, clean and running 
Vital AF 1.2 at 60ml/hr. Valle intact and draining with yellow color urine. Left upper arm 
PICC intact, clean and running TKO. Kept dry, clean, comfortable and HOB>30. Call light 
placed in easy reach. Bed in lowest position and locked. Seizure precaution and contact 
isolation maintained and observed.SCD on bilateral legs. Will continue plan of care.

## 2020-09-11 NOTE — NUR
CASE MANAGEMENT: REVIEW 



9/11/2020



SI:SEPSIS. PNA. 

VS: T 99 HR 65 RR 26 B/P 119/60 SATS 99% ON MECH VENT FIO2 95 

LABS: WBC 19.2  K 3.1  BUN 38  CA 7.7 



IS:DEPAKENE GT Q8H 

SOLU CORTEF IV Q8H 

PROTONIX IV QD

LEVOPHED IV PER PARAMETERS 

DOPAMINE IV PER PARAMETERS 

AMIKACIN IV Q36H 

MEROPENEM IV Q8H 



***ICU*** 



PLAN OF CARE: 

Fluid challenge with IV fluids and albumin

fu H&H

ppi

GTF

## 2020-09-11 NOTE — INTERNAL MED PROGRESS NOTE
Subjective


Physician Name


Tyson Hung


Attending Physician


Chano Cherry MD





Current Medications








 Medications


  (Trade)  Dose


 Ordered  Sig/Didi


 Route


 PRN Reason  Start Time


 Stop Time Status Last Admin


Dose Admin


 


 Acetaminophen


  (Tylenol)  650 mg  Q4H  PRN


 GT


 FEVER  8/26/20 11:45


 9/25/20 11:44  9/7/20 03:17


 


 


 Amikacin Protocol


  (Amikacin


 pharmacy to dose)  1 ea  DAILY  PRN


 MISC


 Per rx protocol  9/8/20 12:00


 10/8/20 11:59   


 


 


 Amikacin Sulfate


 1000 mg/Sodium


 Chloride  114 ml @ 


 114 mls/hr  Q36H


 IV


   9/8/20 14:00


 9/15/20 13:59  9/11/20 15:23


 


 


 Chlorhexidine


 Gluconate


  (Beatrice-Hex 2%)  1 applic  DAILY@2000


 TOPIC


   8/31/20 20:00


 11/29/20 19:59  9/11/20 19:57


 


 


 Clotrimazole


  (Lotrimin)  1 applic  Q12HR


 TOPIC


   8/30/20 13:00


 11/28/20 12:59  9/11/20 08:27


 


 


 Dextrose


  (Dextrose 50%)  25 ml  Q30M  PRN


 IV


 Hypoglycemia  8/26/20 11:30


 11/20/20 11:29   


 


 


 Dextrose


  (Dextrose 50%)  50 ml  Q30M  PRN


 IV


 Hypoglycemia  8/26/20 11:30


 11/20/20 11:29   


 


 


 Dopamine HCl/


 Dextrose  250 ml @ 0


 mls/hr  Q24H


 IV


   9/4/20 11:15


 12/3/20 11:14  9/6/20 19:47


 


 


 Hydrocortisone


  (Solu-CORTEF)  100 mg  EVERY 8  HOURS


 IV


   8/30/20 14:00


 11/28/20 13:59  9/11/20 15:21


 


 


 Levothyroxine


 Sodium


  (Synthroid)  75 mcg  DAILY


 GT


   8/27/20 09:00


 9/22/20 08:59  9/11/20 08:26


 


 


 Meropenem 1 gm/


 Sodium Chloride  55 ml @ 


 110 mls/hr  Q8H


 IVPB


   9/6/20 12:00


 9/16/20 23:59  9/11/20 19:58


 


 


 Midodrine


  (Pro-Amatine)  10 mg  Q8HR


 GT


   8/28/20 14:00


 11/26/20 13:59  9/11/20 15:22


 


 


 Norepinephrine


 Bitartrate 16 mg/


 Dextrose  500 ml @ 0


 mls/hr  Q24H


 IV


   8/31/20 07:45


 9/29/20 12:59  9/4/20 08:43


 


 


 Ondansetron HCl


  (Zofran)  4 mg  Q6H  PRN


 IVP


 Nausea & Vomiting  8/26/20 12:00


 9/21/20 11:59   


 


 


 Pantoprazole


  (Protonix)  40 mg  DAILY


 IV


   8/27/20 09:00


 9/22/20 08:59  9/11/20 08:26


 


 


 Phenylephrine HCl


 50 mg/Dextrose  250 ml @ 0


 mls/hr  Q24H  PRN


 IV


 For hypotension  8/29/20 17:45


 9/28/20 17:44  8/31/20 01:49


 


 


 Polyethylene


 Glycol


  (Miralax)  17 gm  DAILYPRN  PRN


 GT


 Constipation  8/26/20 12:00


 9/21/20 11:59   


 


 


 Valproic Acid


  (Depakene)  500 mg  EVERY 8  HOURS


 GT


   8/26/20 14:00


 9/21/20 21:59  9/11/20 15:22


 








Allergies:  


Coded Allergies:  


     No Known Allergies (Unverified , 1/8/19)


Subjective


in ICU, remained intubated on the vent, open eyes, unable to follow command. WBC

: 18.2, K; 3.1





Objective





Last Vital Signs








  Date Time  Temp Pulse Resp B/P (MAP) Pulse Ox O2 Delivery O2 Flow Rate FiO2


 


9/11/20 19:16  51 26     95


 


9/11/20 18:00    96/53 (67) 96   


 


9/11/20 16:06 99.0       


 


9/11/20 16:00      Mechanical Ventilator  





      Mechanical Ventilator  











Laboratory Tests








Test


  9/11/20


04:25 9/11/20


10:55


 


White Blood Count


  19.2 K/UL


(4.8-10.8)  H 


 


 


Red Blood Count


  2.64 M/UL


(4.20-5.40)  L 


 


 


Hemoglobin


  8.5 G/DL


(12.0-16.0)  L 


 


 


Hematocrit


  25.4 %


(37.0-47.0)  L 


 


 


Mean Corpuscular Volume 96 FL (80-99)   


 


Mean Corpuscular Hemoglobin


  32.4 PG


(27.0-31.0)  H 


 


 


Mean Corpuscular Hemoglobin


Concent 33.6 G/DL


(32.0-36.0) 


 


 


Red Cell Distribution Width


  14.7 %


(11.6-14.8) 


 


 


Platelet Count


  161 K/UL


(150-450) 


 


 


Mean Platelet Volume


  8.7 FL


(6.5-10.1) 


 


 


Neutrophils (%) (Auto)


  % (45.0-75.0)


  


 


 


Lymphocytes (%) (Auto)


  % (20.0-45.0)


  


 


 


Monocytes (%) (Auto)  % (1.0-10.0)   


 


Eosinophils (%) (Auto)  % (0.0-3.0)   


 


Basophils (%) (Auto)  % (0.0-2.0)   


 


Differential Total Cells


Counted 100  


  


 


 


Neutrophils % (Manual) 89 % (45-75)  H 


 


Lymphocytes % (Manual) 6 % (20-45)  L 


 


Monocytes % (Manual) 5 % (1-10)   


 


Eosinophils % (Manual) 0 % (0-3)   


 


Basophils % (Manual) 0 % (0-2)   


 


Band Neutrophils 0 % (0-8)   


 


Platelet Estimate Adequate   


 


Platelet Morphology Normal   


 


Hypochromasia 2+   


 


Anisocytosis 1+   


 


Sodium Level


  152 MMOL/L


(136-145)  H 


 


 


Potassium Level


  3.1 MMOL/L


(3.5-5.1)  L 


 


 


Chloride Level


  117 MMOL/L


()  H 


 


 


Carbon Dioxide Level


  27 MMOL/L


(21-32) 


 


 


Anion Gap


  8 mmol/L


(5-15) 


 


 


Blood Urea Nitrogen


  38 mg/dL


(7-18)  H 


 


 


Creatinine


  0.9 MG/DL


(0.55-1.30) 


 


 


Estimat Glomerular Filtration


Rate > 60 mL/min


(>60) 


 


 


Glucose Level


  177 MG/DL


()  H 


 


 


Calcium Level


  7.7 MG/DL


(8.5-10.1)  L 


 


 


Aspergillus galactomannan


Antigen 


  Pending  


 


 


Beta-(1,3)-D-Glucan  Pending  

















Intake and Output  


 


 9/10/20 9/11/20





 19:00 07:00


 


Intake Total 1000 ml 1160 ml


 


Output Total 710 ml 880 ml


 


Balance 290 ml 280 ml


 


  


 


Free Water 100 ml 100 ml


 


IV Total  220 ml


 


Tube Feeding 720 ml 720 ml


 


Other 180 ml 120 ml


 


Output Urine Total 710 ml 880 ml


 


# Bowel Movements 2 5








Objective


General: remain intubated, unable to follow command, open eyes.


HEENT: NCAT, sclera anicteric, PERRL, ET Tube.


Neck: Supple, no significant jugular venous distention, 


Lungs: mechanical breath sounds decreased air at the bases,  no Wheeze or Rales.


Heart: Regular rate and rhythm, normal S1/S2, no murmurs/gallops


Abdomen: soft, not tender, not distended. + bowel sounds, Morbid Obesity, PEG 

site intact.


Extremities: No Cyanosis , clubbing,  resolving upper extremities edema. left 

upper extremity PICC line.


Neuro: limited secondary to patient status, unable to follow command, bilateral 

upper and lower extremities contracted.





Assessment/Plan


Assessment/Plan


Problem List:  


(1) HCAP (healthcare-associated pneumonia)


(2) Sepsis


(3) Down's syndrome


(4) Dysphagia S/P PEG


(5) Seizure disorder


(6) Hypothyroidism


(7) Acute Hypoxemic respiratory failure


(8) Persistent, high grade bacteremia-  r/o endocarditis


(9) DAVID





Plan: 


Tolerated tube feeding @ 60 cc/hr


Follow-up with laboratory and cultures


Hydrocortisone 100 mg IV every 8


Cont Meropenem#15(abx d #20/21) 


Empiric Amikacin #4











Tyson Hung MD Sep 11, 2020 20:37

## 2020-09-11 NOTE — NUR
NURSE NOTES:

patient in bed sleeping comfortably. Sinus Jose Daniel on cardiac monitor HR 55. no s/s of acute 
distress. no s/s of hypo/hyperglycemia. Repositioned patient in bed with pillow support. 
call light within easy reach.

## 2020-09-11 NOTE — PULMONOLGY CRITICAL CARE NOTE
Critical Care - Asmt/Plan


Problems:  


(1) Acute respiratory failure


(2) Bacteremia


(3) Pneumonia


(4) Sepsis


(5) HCAP (healthcare-associated pneumonia)


(6) Seizure disorder


(7) Down's syndrome


Respiratory:  monitor respiratory rate, adjust FIO2, CXR


Cardiac:  continue pressors, continue to monitor HR/BP


Renal:  F/U I&O, keep IV fluid, check electrolytes


Infectious Disease:  check cultures


Gastrointestinal:  continue feedings/current rate


Endocrine:  monitor blood sugar, continue sliding scale insulin


Hematologic:  monitor H/H, transfuse if hgb<8.5


Neurologic:  keep patient comfortable


Affect:  PRN ativan


Notes Reviewed:  internist, cardio, renal


Discussed with:  nurses, consultants, 





Critical Care - Objective





Last 24 Hour Vital Signs








  Date Time  Temp Pulse Resp B/P (MAP) Pulse Ox O2 Delivery O2 Flow Rate FiO2


 


9/11/20 11:00  65 26 99/48 (65) 98   


 


9/11/20 10:00  59 23 114/57 (76) 98   


 


9/11/20 09:00 98.0 57 17 104/58 (73) 99   


 


9/11/20 08:05        95


 


9/11/20 08:00      Mechanical Ventilator  





      Mechanical Ventilator  


 


9/11/20 08:00 98.9 85 25 107/64 (78) 100   


 


9/11/20 08:00  63      


 


9/11/20 07:45    104/58    


 


9/11/20 07:15  55 26     95


 


9/11/20 07:00  61 24 114/58 (76) 98   


 


9/11/20 06:00  65 23 108/58 (75) 97   


 


9/11/20 05:00  68 17 112/57 (75) 96   


 


9/11/20 04:00 98.9 64 20 97/55 (69) 97   


 


9/11/20 04:00      Mechanical Ventilator  





      Mechanical Ventilator  


 


9/11/20 04:00        95


 


9/11/20 03:55  63      


 


9/11/20 03:00  71 23 114/53 (73) 95   


 


9/11/20 02:59  74 26     95


 


9/11/20 02:00  73 25 103/66 (78) 96   


 


9/11/20 01:00  76 25 98/73 (81) 97   


 


9/11/20 00:00  73 24 113/55 (74) 96   


 


9/11/20 00:00      Mechanical Ventilator  





      Mechanical Ventilator  


 


9/11/20 00:00        95


 


9/11/20 00:00  66      


 


9/10/20 23:00  69 24 115/52 (73) 97   


 


9/10/20 22:56  68 26     95


 


9/10/20 22:00  72 24 114/60 (78) 97   


 


9/10/20 21:00  66 24 105/58 (74) 94   


 


9/10/20 20:00      Mechanical Ventilator  





      Mechanical Ventilator  


 


9/10/20 20:00 98.9 59 25 115/56 (75) 92   


 


9/10/20 20:00  71      


 


9/10/20 20:00        95


 


9/10/20 19:05  63 26     95


 


9/10/20 19:00  58 26 108/58 (75) 92   


 


9/10/20 18:05  57 26 119/47 (71) 94   


 


9/10/20 17:00  65 26 106/45 (65) 97   


 


9/10/20 17:00      Mechanical Ventilator  





      Mechanical Ventilator  


 


9/10/20 16:00 98.9 68 26 118/67 (84) 93   


 


9/10/20 16:00  66      


 


9/10/20 16:00      Mechanical Ventilator  





      Mechanical Ventilator  


 


9/10/20 16:00        95


 


9/10/20 15:20  69 26     95


 


9/10/20 15:00  67 28 122/62 (82) 91   


 


9/10/20 14:00  60 26 98/55 (69) 91   


 


9/10/20 13:00  56 25 98/55 (69) 90   


 


9/10/20 12:04      Mechanical Ventilator  





      Mechanical Ventilator  


 


9/10/20 12:00  57      


 


9/10/20 12:00 98.9 66 26 96/59 (71) 92   


 


9/10/20 12:00        95








Status:  sedated


Condition:  critical


HEENT:  atraumatic, normocephalic


Lungs:  chest wall tender


Heart:  HR/BP unstable


Abdomen:  soft, non-tender


Micro:





Microbiology








 Date/Time


Source Procedure


Growth Status


 


 


 9/8/20 13:11


Blood Blood Culture - Preliminary


NO GROWTH AFTER 48 HOURS Resulted








Accucheck:  131





Critical Care - Subjective


ROS Limited/Unobtainable:  Yes


Condition:  critical


EKG Rhythm:  Sinus Rhythm


FI02:  95


Vent Support Breath Rate:  26


Vent Support Mode:  AC


Vent Tidal Volume:  500


Sputum Amount:  Moderate


PEEP:  10.0


PIP:  44


Tube Feeding Amount:  60


I&O:











Intake and Output  


 


 9/10/20 9/11/20





 19:00 07:00


 


Intake Total 1000 ml 1160 ml


 


Output Total 710 ml 880 ml


 


Balance 290 ml 280 ml


 


  


 


Free Water 100 ml 100 ml


 


IV Total  220 ml


 


Tube Feeding 720 ml 720 ml


 


Other 180 ml 120 ml


 


Output Urine Total 710 ml 880 ml


 


# Bowel Movements 2 5








ET-Tube:  7.5


ET Position:  22


Labs:





Laboratory Tests








Test


  9/11/20


04:25 9/11/20


10:55


 


White Blood Count


  19.2 K/UL


(4.8-10.8)  H 


 


 


Red Blood Count


  2.64 M/UL


(4.20-5.40)  L 


 


 


Hemoglobin


  8.5 G/DL


(12.0-16.0)  L 


 


 


Hematocrit


  25.4 %


(37.0-47.0)  L 


 


 


Mean Corpuscular Volume 96 FL (80-99)   


 


Mean Corpuscular Hemoglobin


  32.4 PG


(27.0-31.0)  H 


 


 


Mean Corpuscular Hemoglobin


Concent 33.6 G/DL


(32.0-36.0) 


 


 


Red Cell Distribution Width


  14.7 %


(11.6-14.8) 


 


 


Platelet Count


  161 K/UL


(150-450) 


 


 


Mean Platelet Volume


  8.7 FL


(6.5-10.1) 


 


 


Neutrophils (%) (Auto)


  % (45.0-75.0)


  


 


 


Lymphocytes (%) (Auto)


  % (20.0-45.0)


  


 


 


Monocytes (%) (Auto)  % (1.0-10.0)   


 


Eosinophils (%) (Auto)  % (0.0-3.0)   


 


Basophils (%) (Auto)  % (0.0-2.0)   


 


Differential Total Cells


Counted 100  


  


 


 


Neutrophils % (Manual) 89 % (45-75)  H 


 


Lymphocytes % (Manual) 6 % (20-45)  L 


 


Monocytes % (Manual) 5 % (1-10)   


 


Eosinophils % (Manual) 0 % (0-3)   


 


Basophils % (Manual) 0 % (0-2)   


 


Band Neutrophils 0 % (0-8)   


 


Platelet Estimate Adequate   


 


Platelet Morphology Normal   


 


Hypochromasia 2+   


 


Anisocytosis 1+   


 


Sodium Level


  152 MMOL/L


(136-145)  H 


 


 


Potassium Level


  3.1 MMOL/L


(3.5-5.1)  L 


 


 


Chloride Level


  117 MMOL/L


()  H 


 


 


Carbon Dioxide Level


  27 MMOL/L


(21-32) 


 


 


Anion Gap


  8 mmol/L


(5-15) 


 


 


Blood Urea Nitrogen


  38 mg/dL


(7-18)  H 


 


 


Creatinine


  0.9 MG/DL


(0.55-1.30) 


 


 


Estimat Glomerular Filtration


Rate > 60 mL/min


(>60) 


 


 


Glucose Level


  177 MG/DL


()  H 


 


 


Calcium Level


  7.7 MG/DL


(8.5-10.1)  L 


 


 


Aspergillus galactomannan


Antigen 


  Pending  


 


 


Beta-(1,3)-D-Glucan  Pending  

















Chano Cherry MD Sep 11, 2020 11:19

## 2020-09-11 NOTE — NUR
NURSE NOTES:

Report received from Nereyda Jin RN.Pt asleep noted no resp distress,orally 
intubated,connected to vent,ETT 7.5 lip line 22cm,AC 26,,Fio2 95%,PEEP 5,no signs of 
pain or discomfort,S-R on the monitor,GTF Vital AF 1.2 at 60 ml/hr,with 30 ml residual,Valle 
cath draining yellow urine,skin warm,dry and edematous, SCD's to bilat lower extremities,IV 
site to CARLOS PICC line intact,SR up x2 HOB elevated ,bed lock in lowest position,will 
continue with plans of care.

## 2020-09-11 NOTE — NUR
NURSE NOTES:

patient in bed awake, suctioned orally. no s/s of acute distress noted. no moaning no facial 
grimaces. HOB elevated. Temp 98.9 axillary. Gt intact no residual. seizure precaution and 
contact isolation maintained and observed. frequent visual checks continued

## 2020-09-11 NOTE — NUR
NURSE NOTES:

Patient awake and maintains eye contact. No apparent signs or symptoms of distress of pain 
noted. Tolerating GT feed well. GT patent and flushing well. Will continue with current plan 
of care.

## 2020-09-11 NOTE — NUR
NURSE HAND-OFF REPORT: 



Latest Vital Signs: Temperature 99.0 , Pulse 57 , B/P 96 /53 , Respiratory Rate 26 , O2 SAT 
96 , Mechanical Ventilator, O2 Flow Rate 15.0 .  

Vital Sign Comment: Stable



EKG Rhythm: Sinus Rhythm

Rhythm change?: N 

MD Notified?: -

MD Response: 



Latest Momin Fall Score: 50  

Fall Risk: High Risk 

Safety Measures: Call light Within Reach, Bed Alarm Zone 3, Side Rails Side Rails x2, Bed 
position Low and Locked.

Fall Precautions: 

Yellow Socks

Yellow Gown

Door Sign

Patient Fall Education



Report given to Nereyda Jin RN.

## 2020-09-11 NOTE — NEPHROLOGY PROGRESS NOTE
Assessment/Plan


Problem List:  


(1) DAVID (acute kidney injury)


(2) Respiratory failure requiring intubation


(3) Down's syndrome


(4) Seizure disorder


(5) Hypothyroidism


Assessment





Acute renal failure, likely due to hypotension


Acute respiratory distress, hypoxia


Seizure disorder


Hypothyroidism


Down syndrome


Full code











Fluid challenge with IV fluids and albumin


Midodrine for BP above 100 systolic


Check TSH level


Check


Correct level


Monitor renal parameters


Urine studies


Per orders


Plan


September 11: Lab reviewed.  Electrolyte abnormalities addressed.  Continue per 

pulmonary and ID.


September 10: Lab reviewed.  Status unchanged.  Serum sodium 151 unchanged.  

Stable from renal standpoint of view.


September 9: Labs reviewed.  Status quo.  D5W 500 cc IV ordered.  Continue to 

monitor renal parameters.


September 8: Status quo.  Labs reviewed.  Overall condition unchanged.  Patient 

was transfused and hemoglobin higher.  Continue current management.  Patient 

remains full code.


September 7: Status quo.  Overall condition poor.  Very low albumin.  

Edematous.  Hypotensive.  Hemoglobin lower.  Anemia work-up ordered.  I favor 

transfusion 2 units of packed RBCs.  Patient remains full code.  I favor 

supportive care only.  Will discuss.


September 6: Electrolyte abnormalities addressed.  Serum creatinine lower.  

Continue per current management.


September 5: Status unchanged.  Lab reviewed.  Serum potassium 2.7.  IV 

potassium chloride ordered.  Serum creatinine low at 1.6 stable.  Blood 

pressure 90s systolic


September 4: Status quo.  Labs reviewed.  Renal parameters stable.  Serum 

creatinine down to 1.6.  Medication list reviewed.  Continues to be on 

midodrine.  Continue per consultants.


September 3: Status quo.  Labs reviewed.  Electrolytes adjusted.  Serum 

creatinine down to 1.8.  Continue per consultants.


September 2: Status quo.  Labs reviewed.  Phosphorus supplement IV given.  

Serum creatinine 2.  Continue per consultants.


September 1: Requires less pressors.  Albumin bolus given.  1 dose of Lasix IV 

ordered as the patient severely edematous.  Patient serum albumin is very low.  

Continue per consultants.


August 31: Continues to be intubated.  Labs reviewed.  Serum creatinine 1.9 

unchanged.  Blood pressure more stable.  Off 1 of the pressors.  Continue to 

monitor renal parameters.  Continue per consultants.  Patient now on 

hydrocortisone 100 mg every 8 hours.  Will decrease IV fluid.  Normal saline 

down to 50 cc an hour.


August 30: Intubated.  Labs reviewed.  Creatinine 1.9 unchanged.  Continue same 

treatment plan.  Per consultants.  Overall poor prognosis since the patient 

remains on pressors and her pulmonary status is worsening.


August 29: Remains intubated.  Labs reviewed.  Creatinine 1.9.  Blood pressure 

systolic 90s.  Continue per consultants.


August 28: Remains intubated.  Labs reviewed.  Serum creatinine lower to 2.  

Vancomycin level lower.  Remains hypotensive on pressors.  Will increase 

midodrine to 10 mg every 8 hours.  Continue per consultants.  Continue to 

monitor renal parameters.


August 27: Patient now in ICU.  Intubated.  On pressors.  Labs reviewed.  Will 

increase midodrine.  Aim to keep blood pressure over 100 systolic.  Will give 

albumin bolus.  Will check vancomycin level which was elevated when checked 

previously on August 24.  Will monitor renal parameters.  Continue per 

consultants.





Subjective


ROS Limited/Unobtainable:  Yes





Objective


Objective





Last 24 Hour Vital Signs








  Date Time  Temp Pulse Resp B/P (MAP) Pulse Ox O2 Delivery O2 Flow Rate FiO2


 


9/11/20 11:00  65 26 99/48 (65) 98   


 


9/11/20 10:00  59 23 114/57 (76) 98   


 


9/11/20 09:00 98.0 57 17 104/58 (73) 99   


 


9/11/20 08:05        95


 


9/11/20 08:00      Mechanical Ventilator  





      Mechanical Ventilator  


 


9/11/20 08:00 98.9 85 25 107/64 (78) 100   


 


9/11/20 08:00  63      


 


9/11/20 07:45    104/58    


 


9/11/20 07:15  55 26     95


 


9/11/20 07:00  61 24 114/58 (76) 98   


 


9/11/20 06:00  65 23 108/58 (75) 97   


 


9/11/20 05:00  68 17 112/57 (75) 96   


 


9/11/20 04:00 98.9 64 20 97/55 (69) 97   


 


9/11/20 04:00      Mechanical Ventilator  





      Mechanical Ventilator  


 


9/11/20 04:00        95


 


9/11/20 03:55  63      


 


9/11/20 03:00  71 23 114/53 (73) 95   


 


9/11/20 02:59  74 26     95


 


9/11/20 02:00  73 25 103/66 (78) 96   


 


9/11/20 01:00  76 25 98/73 (81) 97   


 


9/11/20 00:00  73 24 113/55 (74) 96   


 


9/11/20 00:00      Mechanical Ventilator  





      Mechanical Ventilator  


 


9/11/20 00:00        95


 


9/11/20 00:00  66      


 


9/10/20 23:00  69 24 115/52 (73) 97   


 


9/10/20 22:56  68 26     95


 


9/10/20 22:00  72 24 114/60 (78) 97   


 


9/10/20 21:00  66 24 105/58 (74) 94   


 


9/10/20 20:00      Mechanical Ventilator  





      Mechanical Ventilator  


 


9/10/20 20:00 98.9 59 25 115/56 (75) 92   


 


9/10/20 20:00  71      


 


9/10/20 20:00        95


 


9/10/20 19:05  63 26     95


 


9/10/20 19:00  58 26 108/58 (75) 92   


 


9/10/20 18:05  57 26 119/47 (71) 94   


 


9/10/20 17:00  65 26 106/45 (65) 97   


 


9/10/20 17:00      Mechanical Ventilator  





      Mechanical Ventilator  


 


9/10/20 16:00 98.9 68 26 118/67 (84) 93   


 


9/10/20 16:00  66      


 


9/10/20 16:00      Mechanical Ventilator  





      Mechanical Ventilator  


 


9/10/20 16:00        95


 


9/10/20 15:20  69 26     95


 


9/10/20 15:00  67 28 122/62 (82) 91   


 


9/10/20 14:00  60 26 98/55 (69) 91   


 


9/10/20 13:00  56 25 98/55 (69) 90   


 


9/10/20 12:04      Mechanical Ventilator  





      Mechanical Ventilator  


 


9/10/20 12:00  57      


 


9/10/20 12:00 98.9 66 26 96/59 (71) 92   


 


9/10/20 12:00        95

















Intake and Output  


 


 9/10/20 9/11/20





 19:00 07:00


 


Intake Total 1000 ml 1160 ml


 


Output Total 710 ml 880 ml


 


Balance 290 ml 280 ml


 


  


 


Free Water 100 ml 100 ml


 


IV Total  220 ml


 


Tube Feeding 720 ml 720 ml


 


Other 180 ml 120 ml


 


Output Urine Total 710 ml 880 ml


 


# Bowel Movements 2 5








Laboratory Tests


9/11/20 04:25: 


White Blood Count 19.2H, Red Blood Count 2.64L, Hemoglobin 8.5L, Hematocrit 

25.4L, Mean Corpuscular Volume 96, Mean Corpuscular Hemoglobin 32.4H, Mean 

Corpuscular Hemoglobin Concent 33.6, Red Cell Distribution Width 14.7, Platelet 

Count 161, Mean Platelet Volume 8.7, Neutrophils (%) (Auto) , Lymphocytes (%) (

Auto) , Monocytes (%) (Auto) , Eosinophils (%) (Auto) , Basophils (%) (Auto) , 

Differential Total Cells Counted 100, Neutrophils % (Manual) 89H, Lymphocytes % 

(Manual) 6L, Monocytes % (Manual) 5, Eosinophils % (Manual) 0, Basophils % (

Manual) 0, Band Neutrophils 0, Platelet Estimate Adequate, Platelet Morphology 

Normal, Hypochromasia 2+, Anisocytosis 1+, Sodium Level 152H, Potassium Level 

3.1L, Chloride Level 117H, Carbon Dioxide Level 27, Anion Gap 8, Blood Urea 

Nitrogen 38H, Creatinine 0.9, Estimat Glomerular Filtration Rate > 60, Glucose 

Level 177H, Calcium Level 7.7L


9/11/20 10:55: 


Aspergillus galactomannan Antigen [Pending], Beta-(1,3)-D-Glucan [Pending]


Height (Feet):  5


Height (Inches):  3.00


Weight (Pounds):  172


General Appearance:  no apparent distress


EENT:  other - Intubated on ventilator


Cardiovascular:  normal rate, other - Heart rate variable from 60-85


Respiratory/Chest:  decreased breath sounds











Lam Nino MD Sep 11, 2020 11:33

## 2020-09-11 NOTE — NUR
NURSE NOTES:

Patient asleep, and resting. Noted with soft BM. Valle catheter patent and flushing well, 
noted with clear yellow urine. Perineal care provided. Kept clean and dry at all times. will 
continue to monitor.

## 2020-09-11 NOTE — CARDIOLOGY PROGRESS NOTE
Assessment/Plan


Assessment/Plan


sepsis


respiratory failure 


ards 


renal insuf 


bacteremia


abn cardiac enzyme due to demand 


sinus arnold stable 


thrombocytopenia resolved  


hypernatremia 








covid isolation removed as covid -x3


min trop abn 


ekg last one form 8/22 reviewed non ischemic 


echo preformed 8/25 nl lv function 


now off pressor 


bp borderline again 


vent support 


abx 


tele personally reviewed no pauses 


wbc improved but still up as ofn 9/10 


na increased still 


renal insuf min better 


3rd spacing alot 


cxr form 9/10 reviewed 


tsh seems fine 


sig edema with hypernatremia need nauteresis eventually when bp is better





Subjective


Subjective


on  a vent not communicative





Objective





Last 24 Hour Vital Signs








  Date Time  Temp Pulse Resp B/P (MAP) Pulse Ox O2 Delivery O2 Flow Rate FiO2


 


9/11/20 18:00  57 26 96/53 (67) 96   


 


9/11/20 17:00  66 26 109/55 (73) 99   


 


9/11/20 16:10        95


 


9/11/20 16:06 99.0 65 26 119/60 (79) 99   


 


9/11/20 16:00  65      


 


9/11/20 16:00      Mechanical Ventilator  





      Mechanical Ventilator  


 


9/11/20 15:41  62 26     95


 


9/11/20 15:00  68 21 93/55 (68) 99   


 


9/11/20 14:00  64 23 103/52 (69) 99   


 


9/11/20 13:00 99.0 61 26 99/86 (90) 99   


 


9/11/20 12:13        95


 


9/11/20 12:11 99.0 67 26 105/57 (73) 99   


 


9/11/20 12:00      Mechanical Ventilator  





      Mechanical Ventilator  


 


9/11/20 12:00  60      


 


9/11/20 11:00  65 26 99/48 (65) 98   


 


9/11/20 10:54  55 26     95


 


9/11/20 10:00  59 23 114/57 (76) 98   


 


9/11/20 09:00 98.0 57 17 104/58 (73) 99   


 


9/11/20 08:05        95


 


9/11/20 08:00      Mechanical Ventilator  





      Mechanical Ventilator  


 


9/11/20 08:00 98.9 85 25 107/64 (78) 100   


 


9/11/20 08:00  63      


 


9/11/20 07:45    104/58    


 


9/11/20 07:15  55 26     95


 


9/11/20 07:00  61 24 114/58 (76) 98   


 


9/11/20 06:00  65 23 108/58 (75) 97   


 


9/11/20 05:00  68 17 112/57 (75) 96   


 


9/11/20 04:00 98.9 64 20 97/55 (69) 97   


 


9/11/20 04:00      Mechanical Ventilator  





      Mechanical Ventilator  


 


9/11/20 04:00        95


 


9/11/20 03:55  63      


 


9/11/20 03:00  71 23 114/53 (73) 95   


 


9/11/20 02:59  74 26     95


 


9/11/20 02:00  73 25 103/66 (78) 96   


 


9/11/20 01:00  76 25 98/73 (81) 97   


 


9/11/20 00:00  73 24 113/55 (74) 96   


 


9/11/20 00:00      Mechanical Ventilator  





      Mechanical Ventilator  


 


9/11/20 00:00        95


 


9/11/20 00:00  66      


 


9/10/20 23:00  69 24 115/52 (73) 97   


 


9/10/20 22:56  68 26     95


 


9/10/20 22:00  72 24 114/60 (78) 97   


 


9/10/20 21:00  66 24 105/58 (74) 94   


 


9/10/20 20:00      Mechanical Ventilator  





      Mechanical Ventilator  


 


9/10/20 20:00 98.9 59 25 115/56 (75) 92   


 


9/10/20 20:00  71      


 


9/10/20 20:00        95


 


9/10/20 19:05  63 26     95


 


9/10/20 19:00  58 26 108/58 (75) 92   








General Appearance:  no apparent distress, on vent, patient on isolation, 

isolation precautions


Neck:  supple


Cardiovascular:  normal rate


Respiratory/Chest:  rhonchi - bilaterally


Abdomen:  normal bowel sounds, non tender, soft


Extremities:  moderate edema











Intake and Output  


 


 9/10/20 9/11/20





 19:00 07:00


 


Intake Total 1000 ml 1160 ml


 


Output Total 710 ml 880 ml


 


Balance 290 ml 280 ml


 


  


 


Free Water 100 ml 100 ml


 


IV Total  220 ml


 


Tube Feeding 720 ml 720 ml


 


Other 180 ml 120 ml


 


Output Urine Total 710 ml 880 ml


 


# Bowel Movements 2 5











Laboratory Tests








Test


  9/11/20


04:25 9/11/20


10:55


 


White Blood Count


  19.2 K/UL


(4.8-10.8)  H 


 


 


Red Blood Count


  2.64 M/UL


(4.20-5.40)  L 


 


 


Hemoglobin


  8.5 G/DL


(12.0-16.0)  L 


 


 


Hematocrit


  25.4 %


(37.0-47.0)  L 


 


 


Mean Corpuscular Volume 96 FL (80-99)   


 


Mean Corpuscular Hemoglobin


  32.4 PG


(27.0-31.0)  H 


 


 


Mean Corpuscular Hemoglobin


Concent 33.6 G/DL


(32.0-36.0) 


 


 


Red Cell Distribution Width


  14.7 %


(11.6-14.8) 


 


 


Platelet Count


  161 K/UL


(150-450) 


 


 


Mean Platelet Volume


  8.7 FL


(6.5-10.1) 


 


 


Neutrophils (%) (Auto)


  % (45.0-75.0)


  


 


 


Lymphocytes (%) (Auto)


  % (20.0-45.0)


  


 


 


Monocytes (%) (Auto)  % (1.0-10.0)   


 


Eosinophils (%) (Auto)  % (0.0-3.0)   


 


Basophils (%) (Auto)  % (0.0-2.0)   


 


Differential Total Cells


Counted 100  


  


 


 


Neutrophils % (Manual) 89 % (45-75)  H 


 


Lymphocytes % (Manual) 6 % (20-45)  L 


 


Monocytes % (Manual) 5 % (1-10)   


 


Eosinophils % (Manual) 0 % (0-3)   


 


Basophils % (Manual) 0 % (0-2)   


 


Band Neutrophils 0 % (0-8)   


 


Platelet Estimate Adequate   


 


Platelet Morphology Normal   


 


Hypochromasia 2+   


 


Anisocytosis 1+   


 


Sodium Level


  152 MMOL/L


(136-145)  H 


 


 


Potassium Level


  3.1 MMOL/L


(3.5-5.1)  L 


 


 


Chloride Level


  117 MMOL/L


()  H 


 


 


Carbon Dioxide Level


  27 MMOL/L


(21-32) 


 


 


Anion Gap


  8 mmol/L


(5-15) 


 


 


Blood Urea Nitrogen


  38 mg/dL


(7-18)  H 


 


 


Creatinine


  0.9 MG/DL


(0.55-1.30) 


 


 


Estimat Glomerular Filtration


Rate > 60 mL/min


(>60) 


 


 


Glucose Level


  177 MG/DL


()  H 


 


 


Calcium Level


  7.7 MG/DL


(8.5-10.1)  L 


 


 


Aspergillus galactomannan


Antigen 


  Pending  


 


 


Beta-(1,3)-D-Glucan  Pending  

















Constantine Jorgensen MD Sep 11, 2020 18:43

## 2020-09-11 NOTE — NUR
NURSE NOTES:

Patient asleep with no sign of distress noted. On GT feed and tolerating well. Will continue 
to monitor.

## 2020-09-12 VITALS — DIASTOLIC BLOOD PRESSURE: 47 MMHG | SYSTOLIC BLOOD PRESSURE: 104 MMHG

## 2020-09-12 VITALS — SYSTOLIC BLOOD PRESSURE: 121 MMHG | DIASTOLIC BLOOD PRESSURE: 61 MMHG

## 2020-09-12 VITALS — DIASTOLIC BLOOD PRESSURE: 49 MMHG | SYSTOLIC BLOOD PRESSURE: 106 MMHG

## 2020-09-12 VITALS — DIASTOLIC BLOOD PRESSURE: 64 MMHG | SYSTOLIC BLOOD PRESSURE: 105 MMHG

## 2020-09-12 VITALS — DIASTOLIC BLOOD PRESSURE: 47 MMHG | SYSTOLIC BLOOD PRESSURE: 107 MMHG

## 2020-09-12 VITALS — SYSTOLIC BLOOD PRESSURE: 111 MMHG | DIASTOLIC BLOOD PRESSURE: 67 MMHG

## 2020-09-12 VITALS — DIASTOLIC BLOOD PRESSURE: 52 MMHG | SYSTOLIC BLOOD PRESSURE: 94 MMHG

## 2020-09-12 VITALS — DIASTOLIC BLOOD PRESSURE: 66 MMHG | SYSTOLIC BLOOD PRESSURE: 114 MMHG

## 2020-09-12 VITALS — DIASTOLIC BLOOD PRESSURE: 49 MMHG | SYSTOLIC BLOOD PRESSURE: 97 MMHG

## 2020-09-12 VITALS — DIASTOLIC BLOOD PRESSURE: 54 MMHG | SYSTOLIC BLOOD PRESSURE: 95 MMHG

## 2020-09-12 VITALS — DIASTOLIC BLOOD PRESSURE: 56 MMHG | SYSTOLIC BLOOD PRESSURE: 105 MMHG

## 2020-09-12 VITALS — SYSTOLIC BLOOD PRESSURE: 98 MMHG | DIASTOLIC BLOOD PRESSURE: 52 MMHG

## 2020-09-12 VITALS — SYSTOLIC BLOOD PRESSURE: 105 MMHG | DIASTOLIC BLOOD PRESSURE: 56 MMHG

## 2020-09-12 VITALS — SYSTOLIC BLOOD PRESSURE: 118 MMHG | DIASTOLIC BLOOD PRESSURE: 59 MMHG

## 2020-09-12 VITALS — SYSTOLIC BLOOD PRESSURE: 95 MMHG | DIASTOLIC BLOOD PRESSURE: 49 MMHG

## 2020-09-12 VITALS — SYSTOLIC BLOOD PRESSURE: 98 MMHG | DIASTOLIC BLOOD PRESSURE: 59 MMHG

## 2020-09-12 VITALS — SYSTOLIC BLOOD PRESSURE: 101 MMHG | DIASTOLIC BLOOD PRESSURE: 52 MMHG

## 2020-09-12 VITALS — SYSTOLIC BLOOD PRESSURE: 107 MMHG | DIASTOLIC BLOOD PRESSURE: 54 MMHG

## 2020-09-12 VITALS — SYSTOLIC BLOOD PRESSURE: 105 MMHG | DIASTOLIC BLOOD PRESSURE: 72 MMHG

## 2020-09-12 VITALS — SYSTOLIC BLOOD PRESSURE: 106 MMHG | DIASTOLIC BLOOD PRESSURE: 51 MMHG

## 2020-09-12 VITALS — SYSTOLIC BLOOD PRESSURE: 103 MMHG | DIASTOLIC BLOOD PRESSURE: 56 MMHG

## 2020-09-12 VITALS — SYSTOLIC BLOOD PRESSURE: 97 MMHG | DIASTOLIC BLOOD PRESSURE: 56 MMHG

## 2020-09-12 VITALS — DIASTOLIC BLOOD PRESSURE: 51 MMHG | SYSTOLIC BLOOD PRESSURE: 112 MMHG

## 2020-09-12 VITALS — DIASTOLIC BLOOD PRESSURE: 49 MMHG | SYSTOLIC BLOOD PRESSURE: 102 MMHG

## 2020-09-12 LAB
ADD MANUAL DIFF: YES
ALBUMIN SERPL-MCNC: 1.1 G/DL (ref 3.4–5)
ALBUMIN/GLOB SERPL: 0.3 {RATIO} (ref 1–2.7)
ALP SERPL-CCNC: 59 U/L (ref 46–116)
ALT SERPL-CCNC: 118 U/L (ref 12–78)
ANION GAP SERPL CALC-SCNC: 4 MMOL/L (ref 5–15)
AST SERPL-CCNC: 40 U/L (ref 15–37)
BILIRUB SERPL-MCNC: 0.5 MG/DL (ref 0.2–1)
BUN SERPL-MCNC: 35 MG/DL (ref 7–18)
CALCIUM SERPL-MCNC: 7.5 MG/DL (ref 8.5–10.1)
CHLORIDE SERPL-SCNC: 117 MMOL/L (ref 98–107)
CO2 SERPL-SCNC: 29 MMOL/L (ref 21–32)
CREAT SERPL-MCNC: 0.9 MG/DL (ref 0.55–1.3)
ERYTHROCYTE [DISTWIDTH] IN BLOOD BY AUTOMATED COUNT: 14.5 % (ref 11.6–14.8)
GLOBULIN SER-MCNC: 3.5 G/DL
HCT VFR BLD CALC: 25.9 % (ref 37–47)
HGB BLD-MCNC: 8.7 G/DL (ref 12–16)
MCV RBC AUTO: 97 FL (ref 80–99)
PHOSPHATE SERPL-MCNC: 3 MG/DL (ref 2.5–4.9)
PLATELET # BLD: 178 K/UL (ref 150–450)
POTASSIUM SERPL-SCNC: 3.2 MMOL/L (ref 3.5–5.1)
RBC # BLD AUTO: 2.67 M/UL (ref 4.2–5.4)
SODIUM SERPL-SCNC: 150 MMOL/L (ref 136–145)
WBC # BLD AUTO: 11.3 K/UL (ref 4.8–10.8)

## 2020-09-12 RX ADMIN — VALPROIC ACID SCH MG: 250 SOLUTION ORAL at 13:19

## 2020-09-12 RX ADMIN — MIDODRINE HYDROCHLORIDE SCH MG: 10 TABLET ORAL at 21:34

## 2020-09-12 RX ADMIN — VALPROIC ACID SCH MG: 250 SOLUTION ORAL at 05:32

## 2020-09-12 RX ADMIN — MIDODRINE HYDROCHLORIDE SCH MG: 10 TABLET ORAL at 05:31

## 2020-09-12 RX ADMIN — VALPROIC ACID SCH MG: 250 SOLUTION ORAL at 21:34

## 2020-09-12 RX ADMIN — HYDROCORTISONE SODIUM SUCCINATE SCH MG: 100 INJECTION, POWDER, FOR SOLUTION INTRAMUSCULAR; INTRAVENOUS at 21:34

## 2020-09-12 RX ADMIN — PANTOPRAZOLE SODIUM SCH MG: 40 INJECTION, POWDER, FOR SOLUTION INTRAVENOUS at 08:39

## 2020-09-12 RX ADMIN — DOPAMINE HYDROCHLORIDE IN DEXTROSE SCH MLS/HR: 1.6 INJECTION, SOLUTION INTRAVENOUS at 11:03

## 2020-09-12 RX ADMIN — MEROPENEM SCH MLS/HR: 1 INJECTION INTRAVENOUS at 04:01

## 2020-09-12 RX ADMIN — SODIUM CHLORIDE SCH MLS/HR: 900 INJECTION, SOLUTION INTRAVENOUS at 07:45

## 2020-09-12 RX ADMIN — POTASSIUM CHLORIDE SCH MLS/HR: 200 INJECTION, SOLUTION INTRAVENOUS at 11:03

## 2020-09-12 RX ADMIN — MEROPENEM SCH MLS/HR: 1 INJECTION INTRAVENOUS at 19:44

## 2020-09-12 RX ADMIN — MEROPENEM SCH MLS/HR: 1 INJECTION INTRAVENOUS at 11:26

## 2020-09-12 RX ADMIN — HYDROCORTISONE SODIUM SUCCINATE SCH MG: 100 INJECTION, POWDER, FOR SOLUTION INTRAMUSCULAR; INTRAVENOUS at 05:32

## 2020-09-12 RX ADMIN — CHLORHEXIDINE GLUCONATE SCH APPLIC: 213 SOLUTION TOPICAL at 19:44

## 2020-09-12 RX ADMIN — HYDROCORTISONE SODIUM SUCCINATE SCH MG: 100 INJECTION, POWDER, FOR SOLUTION INTRAMUSCULAR; INTRAVENOUS at 13:19

## 2020-09-12 RX ADMIN — POTASSIUM CHLORIDE SCH MLS/HR: 200 INJECTION, SOLUTION INTRAVENOUS at 08:39

## 2020-09-12 RX ADMIN — MIDODRINE HYDROCHLORIDE SCH MG: 10 TABLET ORAL at 13:19

## 2020-09-12 NOTE — NUR
NURSE NOTES:



Turned patient to left side to keep the right chest elevated as per order. Suctioned as 
needed. Patient is watching TV in bed. No acute distress noted.

## 2020-09-12 NOTE — NUR
NURSE NOTES:



Dr Cherry here to see the patient. Updated him with patient's current condition. No new 
orders for now.

## 2020-09-12 NOTE — NUR
NURSE NOTES:



Dr Nino here to see the patient. Updated him with patient's current condition, 
including low K level. Replacement is already ordered and running. Will continue to monitor.

## 2020-09-12 NOTE — NUR
NURSE NOTES:

patient in bed sleeping comfortably. Sinus Jose Daniel on cardiac monitor HR 59. no s/s of acute 
distress. no s/s of hypo/hyperglycemia. Suctioned patient orally. Repositioned patient in 
bed with pillow support. call light within easy reach.

## 2020-09-12 NOTE — NUR
NURSE NOTES:

PATIENT AWOKE, OPEN EYES, ON ETT TO VENT, AC 26//FIO2 95%/PEEP 10, O2 SATURATION 96% 
NOTED, HR 60'S/MIN, SR NOTED, G TUBE INTACT AND PATENT, ONGOING VITAL AF 1.2 AT 60ML/HR, 
RESIDUE 40ML NOTED, KEPT HOB OVER 30 DEGREES, ASPIRATION AND SZ PRECAUTION, ABDOMEN SOFT, 
NON TENDER, F/C INTACT AND PATENT, MARY COLOR URINE OUTED, PICC LINE TO LEFT UPPER ARM, 
INTACT AND PATENT, TKO STATUS, ON P200 BED, MADE LOWER BED POSITION, ON BED ALARM AND 
LOCKED, PLACE CALL LIGHT WITHIN REACH, WILL CONTINUE TO MONITOR.

## 2020-09-12 NOTE — NUR
NURSE NOTES:



Patient is sleeping in bed. Jose Daniel down to 50's when sleeping. Turned and repositioned 
patient. HR back up to 60's. Will closely monitor HR.

## 2020-09-12 NOTE — NUR
NURSE NOTES:

patient in bed awake, suctioned orally. no s/s of acute distress noted. no moaning no facial 
grimaces. HOB elevated. Temp 98.0 axillary. Gt intact no residual. seizure precaution and 
contact isolation maintained and observed. frequent visual checks continued

## 2020-09-12 NOTE — INTERNAL MED PROGRESS NOTE
Subjective


Date of Service:  Sep 12, 2020


Physician Name


Sanya Schultz


Attending Physician


Chano Cherry MD





Current Medications








 Medications


  (Trade)  Dose


 Ordered  Sig/Didi


 Route


 PRN Reason  Start Time


 Stop Time Status Last Admin


Dose Admin


 


 Acetaminophen


  (Tylenol)  650 mg  Q4H  PRN


 GT


 FEVER  8/26/20 11:45


 9/25/20 11:44  9/7/20 03:17


 


 


 Amikacin Protocol


  (Amikacin


 pharmacy to dose)  1 ea  DAILY  PRN


 MISC


 Per rx protocol  9/8/20 12:00


 10/8/20 11:59   


 


 


 Amikacin Sulfate


 1000 mg/Sodium


 Chloride  114 ml @ 


 114 mls/hr  Q36H


 IV


   9/8/20 14:00


 9/15/20 13:59  9/11/20 15:23


 


 


 Chlorhexidine


 Gluconate


  (Beatrice-Hex 2%)  1 applic  DAILY@2000


 TOPIC


   8/31/20 20:00


 11/29/20 19:59  9/11/20 19:57


 


 


 Clotrimazole


  (Lotrimin)  1 applic  Q12HR


 TOPIC


   8/30/20 13:00


 11/28/20 12:59  9/12/20 08:40


 


 


 Dextrose


  (Dextrose 50%)  25 ml  Q30M  PRN


 IV


 Hypoglycemia  8/26/20 11:30


 11/20/20 11:29   


 


 


 Dextrose


  (Dextrose 50%)  50 ml  Q30M  PRN


 IV


 Hypoglycemia  8/26/20 11:30


 11/20/20 11:29   


 


 


 Dopamine HCl/


 Dextrose  250 ml @ 0


 mls/hr  Q24H


 IV


   9/4/20 11:15


 12/3/20 11:14  9/6/20 19:47


 


 


 Hydrocortisone


  (Solu-CORTEF)  100 mg  EVERY 8  HOURS


 IV


   8/30/20 14:00


 11/28/20 13:59  9/12/20 13:19


 


 


 Levothyroxine


 Sodium


  (Synthroid)  75 mcg  DAILY


 GT


   8/27/20 09:00


 9/22/20 08:59  9/12/20 08:40


 


 


 Meropenem 1 gm/


 Sodium Chloride  55 ml @ 


 110 mls/hr  Q8H


 IVPB


   9/6/20 12:00


 9/16/20 23:59  9/12/20 11:26


 


 


 Midodrine


  (Pro-Amatine)  10 mg  Q8HR


 GT


   8/28/20 14:00


 11/26/20 13:59  9/12/20 13:19


 


 


 Norepinephrine


 Bitartrate 16 mg/


 Dextrose  500 ml @ 0


 mls/hr  Q24H


 IV


   8/31/20 07:45


 9/29/20 12:59  9/4/20 08:43


 


 


 Ondansetron HCl


  (Zofran)  4 mg  Q6H  PRN


 IVP


 Nausea & Vomiting  8/26/20 12:00


 9/21/20 11:59   


 


 


 Pantoprazole


  (Protonix)  40 mg  DAILY


 IV


   8/27/20 09:00


 9/22/20 08:59  9/12/20 08:39


 


 


 Phenylephrine HCl


 50 mg/Dextrose  250 ml @ 0


 mls/hr  Q24H  PRN


 IV


 For hypotension  8/29/20 17:45


 9/28/20 17:44  8/31/20 01:49


 


 


 Polyethylene


 Glycol


  (Miralax)  17 gm  DAILYPRN  PRN


 GT


 Constipation  8/26/20 12:00


 9/21/20 11:59   


 


 


 Valproic Acid


  (Depakene)  500 mg  EVERY 8  HOURS


 GT


   8/26/20 14:00


 9/21/20 21:59  9/12/20 13:19


 








Allergies:  


Coded Allergies:  


     No Known Allergies (Unverified , 1/8/19)


ROS Limited/Unobtainable:  Yes


Subjective


58 YO F with Down's syndrome admitted with hypoxia.  Now sepsis and pneumonia.  

Cover for Int Med-DR Hung.  ICU.  Intubated and sedated





Objective





Last Vital Signs








  Date Time  Temp Pulse Resp B/P (MAP) Pulse Ox O2 Delivery O2 Flow Rate FiO2


 


9/12/20 14:00  51 26 101/52 (68) 94   


 


9/12/20 12:00      Mechanical Ventilator  





      Mechanical Ventilator  


 


9/12/20 12:00        95


 


9/12/20 08:00 98.6       











Laboratory Tests








Test


  9/12/20


04:00


 


White Blood Count


  11.3 K/UL


(4.8-10.8)  H


 


Red Blood Count


  2.67 M/UL


(4.20-5.40)  L


 


Hemoglobin


  8.7 G/DL


(12.0-16.0)  L


 


Hematocrit


  25.9 %


(37.0-47.0)  L


 


Mean Corpuscular Volume 97 FL (80-99)  


 


Mean Corpuscular Hemoglobin


  32.6 PG


(27.0-31.0)  H


 


Mean Corpuscular Hemoglobin


Concent 33.6 G/DL


(32.0-36.0)


 


Red Cell Distribution Width


  14.5 %


(11.6-14.8)


 


Platelet Count


  178 K/UL


(150-450)


 


Mean Platelet Volume


  8.8 FL


(6.5-10.1)


 


Neutrophils (%) (Auto)


  % (45.0-75.0)


 


 


Lymphocytes (%) (Auto)


  % (20.0-45.0)


 


 


Monocytes (%) (Auto)  % (1.0-10.0)  


 


Eosinophils (%) (Auto)  % (0.0-3.0)  


 


Basophils (%) (Auto)  % (0.0-2.0)  


 


Differential Total Cells


Counted 100  


 


 


Neutrophils % (Manual) 90 % (45-75)  H


 


Lymphocytes % (Manual) 6 % (20-45)  L


 


Monocytes % (Manual) 3 % (1-10)  


 


Eosinophils % (Manual) 1 % (0-3)  


 


Basophils % (Manual) 0 % (0-2)  


 


Band Neutrophils 0 % (0-8)  


 


Platelet Estimate Adequate  


 


Platelet Morphology Normal  


 


Hypochromasia 2+  


 


Anisocytosis 1+  


 


Spherocytes 1+  


 


Sodium Level


  150 MMOL/L


(136-145)  H


 


Potassium Level


  3.2 MMOL/L


(3.5-5.1)  L


 


Chloride Level


  117 MMOL/L


()  H


 


Carbon Dioxide Level


  29 MMOL/L


(21-32)


 


Anion Gap


  4 mmol/L


(5-15)  L


 


Blood Urea Nitrogen


  35 mg/dL


(7-18)  H


 


Creatinine


  0.9 MG/DL


(0.55-1.30)


 


Estimat Glomerular Filtration


Rate > 60 mL/min


(>60)


 


Glucose Level


  158 MG/DL


()  H


 


Calcium Level


  7.5 MG/DL


(8.5-10.1)  L


 


Phosphorus Level


  3.0 MG/DL


(2.5-4.9)


 


Magnesium Level


  1.8 MG/DL


(1.8-2.4)


 


Total Bilirubin


  0.5 MG/DL


(0.2-1.0)


 


Aspartate Amino Transf


(AST/SGOT) 40 U/L (15-37)


H


 


Alanine Aminotransferase


(ALT/SGPT) 118 U/L


(12-78)  H


 


Alkaline Phosphatase


  59 U/L


()


 


Total Protein


  4.6 G/DL


(6.4-8.2)  L


 


Albumin


  1.1 G/DL


(3.4-5.0)  L


 


Globulin 3.5 g/dL  


 


Albumin/Globulin Ratio


  0.3 (1.0-2.7)


L

















Intake and Output  


 


 9/11/20 9/12/20





 19:00 07:00


 


Intake Total 1020 ml 1260 ml


 


Output Total 1255 ml 705 ml


 


Balance -235 ml 555 ml


 


  


 


Free Water 200 ml 200 ml


 


IV Total  220 ml


 


Tube Feeding 720 ml 720 ml


 


Other 100 ml 120 ml


 


Output Urine Total 1255 ml 705 ml


 


# Bowel Movements 4 3








Objective


General Appearance:  WD/WN, no apparent distress, alert


EENT:  PERRL/EOMI, normal ENT inspection


Neck:  non-tender, normal alignment, supple, normal inspection


Cardiovascular:  normal peripheral pulses, normal rate, regular rhythm, no 

gallop/murmur, no JVD


Respiratory/Chest:  Mech vent; decreased breath sounds, crackles/rales, rhonchi 

- bilaterally, expiratory wheezing


Abdomen:  normal bowel sounds, non tender, soft, no organomegaly, no mass


Extremities:  normal range of motion


Neurologic:  CNs II-XII grossly normal


Skin:  normal pigmentation, warm/dry





Assessment/Plan


Problem List:  


(1) HCAP (healthcare-associated pneumonia)


Assessment & Plan:  Strep Group G.  Continue daptomycin per ID=Dr Gomes.  

Pulmonary/Critical care=DR Cherry.  COVID NEG





(2) Sepsis


Assessment & Plan:  Staph haemolyticus.  Continue amikacin and meropenem per ID=

Dr Gomes





(3) Down's syndrome


(4) Dysphagia


Assessment & Plan:  S/P PEG





(5) Seizure disorder


Assessment & Plan:  Continue keppra and depakote





(6) Hypothyroidism


Assessment & Plan:  Continue synthroid





(7) Acute respiratory failure


Assessment & Plan:  Pulmonary = Dr Cherry; [facility] vent














Sanya Schultz MD Sep 12, 2020 15:05

## 2020-09-12 NOTE — NUR
NURSE NOTES:

PATIENT AWOKE, NO PAIN OR SOB NOTED AT THIS TIME, NO RESISTANCE TO CARE STATUS, WILL 
CONTINUE TO MONITOR.

## 2020-09-12 NOTE — NUR
NURSE NOTES:



Report received from LAYTON Dawn. Patient is awake and resting in bed. Eyes follow. 
Oriented to name. Non-verbal. Unable to follow commands. SR on cardiac monitor. ETT 7.5/22 
cm at lip line. AC 26, , FiO2 95%, P 10. O2 sat 93-94%. GT in place, receiving Vital 
AF 1.2 at 60cc/hr. No residual noted. Pitting +2 edema on bilateral upper extremitas and 
feet noted. Vlale in place draining to gravity. CARLOS PICC line patent and asymptomatic, open 
for TKO. Bed in lowest position. Side rails up x3. Will resume plan of care.

## 2020-09-12 NOTE — NUR
NURSE NOTES:

Bed bath given tolerated well. no s/s of acute distress. no fever. no diarrhea. no n/v. will 
continue plan of care.

## 2020-09-12 NOTE — NUR
NURSE HAND-OFF REPORT: 



Latest Vital Signs: Temperature 98.9 , Pulse 59 , B/P 108 /54 , Respiratory Rate 25 , O2 SAT 
96 , Mechanical Ventilator, O2 Flow Rate 15.0 .  

Vital Sign Comment: 



EKG Rhythm: Sinus Bradycardia

Rhythm change?: N 

MD Notified?: -

MD Response: 



Latest Momin Fall Score: 70  

Fall Risk: High Risk 

Safety Measures: Call light Within Reach, Bed Alarm Zone 3, Side Rails Side Rails x2, Bed 
position Low and Locked.

Fall Precautions: 

Yellow Socks

Yellow Gown

Door Sign

Patient Fall Education



Report given to Nirav Joy RN.

## 2020-09-12 NOTE — NUR
RD ASSESSMENT & RECOMMENDATIONS

SEE CARE ACTIVITY FOR COMPLETE ASSESSMENT



DAILY ESTIMATED NEEDS:

Needs based on CRITICAL CARE, 50kg  

22-27  kcals/kg 

2094-3463  total kcals

1.25-2  g protein/kg

  g total protein 

25-30  mL/kg

5276-2786  total fluid mLs



NUTRITION DIAGNOSIS:

Swallowing difficulty r/t dysphagia as evidenced by pt w/ Downs Syndrome,

PEG dep for all nutritional needs, now intubated, now off pressor support,

ICU status.



   



CURRENT TF:Vital AF 1.2 @ 60ml/hr x 22 hrs  

 







ENTERAL NUTRITION RECOMMENDATIONS:

VITAL AF 1.2 @ 50ml/hr x22 hrs   to provide 1100ml, 1320 kcal, 83g pro, 892ml free H2O  



- LOWER goal rate to 50ml/hr not to exceed est kcal needs

      -> will meet 100% est kcal/prot needs

- Hold TF 1 hr before and after synthroid meds.

- Flush per MD, HOB over 30 degrees.

-------------

***WITHOUT HEMODYNAMIC STABILITY, rec trophic feeds of Vital AF 1.2 @ 10ml/hr***



 





ADDITIONAL RECOMMENDATIONS:

1) Ht of 61 inches per SNF; recalibrate bed scale for accurate CBW  

2) Monitor BG closely w/ Solucortef-> rec add novolog for BG control  

3) Monitor hemodynamic stability: pressors held at this time 

4) Wound healing: Continue Vit C 250mg QD + Marcio BID 

5) Monitor lytes, replete as needed 

6) Monitor TF tolerance: h/o elev residuals, TF now well tolerated 

6) Rec prokinetics w/ continued residuals-> minimal now, 6ml, 8ml.

## 2020-09-12 NOTE — CARDIOLOGY PROGRESS NOTE
Assessment/Plan


Assessment/Plan


sepsis


respiratory failure 


ards 


renal insuf 


bacteremia


abn cardiac enzyme due to demand 


sinus arnold stable 


thrombocytopenia resolved  


hypernatremia 








covid isolation removed as covid -x3


min trop abn 


ekg last one form 8/22 reviewed non ischemic 


echo preformed 8/25 nl lv function 


off pressor 


bp borderline again 


vent support 


abx 





wbc improved 


na increased still 


renal insuf min better 


3rd spacing alot 


cxr form 9/10 reviewed 


tsh seems fine 


sig edema with hypernatremia need nauteresis eventually when bp is better





Subjective


ROS Limited/Unobtainable:  Yes


Subjective


on  a vent not communicative





Objective





Last 24 Hour Vital Signs








  Date Time  Temp Pulse Resp B/P (MAP) Pulse Ox O2 Delivery O2 Flow Rate FiO2


 


9/12/20 13:00  62 26 105/56 (72) 96   


 


9/12/20 12:00  64 25 106/51 (69) 96   


 


9/12/20 12:00      Mechanical Ventilator  





      Mechanical Ventilator  


 


9/12/20 12:00        95


 


9/12/20 12:00  63      


 


9/12/20 11:28  63 26     95


 


9/12/20 11:00  62 23 97/56 (70) 92   


 


9/12/20 10:00  69 23 107/54 (71) 92   


 


9/12/20 09:00  57 26 97/49 (65) 95   


 


9/12/20 08:00  58      


 


9/12/20 08:00 98.6 58 26 106/49 (68) 92   


 


9/12/20 08:00      Mechanical Ventilator  





      Mechanical Ventilator  


 


9/12/20 08:00        95


 


9/12/20 07:16  68 27     95


 


9/12/20 07:00  58 25 112/51 (71) 95   


 


9/12/20 06:00  62 25 104/47 (66) 94   


 


9/12/20 05:00  70 24 114/66 (82) 90   


 


9/12/20 04:00 98.0 59 25 107/47 (67) 92   


 


9/12/20 04:00      Mechanical Ventilator  





      Mechanical Ventilator  


 


9/12/20 04:00        95


 


9/12/20 03:41  57      


 


9/12/20 03:16  59 26     95


 


9/12/20 03:00  57 23 102/49 (66) 95   


 


9/12/20 02:00  59 24 103/56 (72) 94   


 


9/12/20 01:00  50 26 118/59 (78) 94   


 


9/12/20 00:00 98.7 51 26 121/61 (81) 95   


 


9/12/20 00:00      Mechanical Ventilator  





      Mechanical Ventilator  


 


9/12/20 00:00        95


 


9/12/20 00:00  53      


 


9/11/20 23:00  52 26 125/63 (83) 97   


 


9/11/20 22:59  57 26     95


 


9/11/20 22:00  67 26 103/52 (69) 98   


 


9/11/20 21:00  67 26 99/52 (68) 98   


 


9/11/20 20:00        95


 


9/11/20 20:00  56      


 


9/11/20 20:00 98.9 58 26 119/63 (81) 99   


 


9/11/20 20:00      Mechanical Ventilator  





      Mechanical Ventilator  


 


9/11/20 19:16  51 26     95


 


9/11/20 19:00  52 26 92/50 (64) 97   


 


9/11/20 18:00  57 26 96/53 (67) 96   


 


9/11/20 17:00  66 26 109/55 (73) 99   


 


9/11/20 16:10        95


 


9/11/20 16:06 99.0 65 26 119/60 (79) 99   


 


9/11/20 16:00  65      


 


9/11/20 16:00      Mechanical Ventilator  





      Mechanical Ventilator  


 


9/11/20 15:41  62 26     95


 


9/11/20 15:00  68 21 93/55 (68) 99   

















Intake and Output  


 


 9/11/20 9/12/20





 19:00 07:00


 


Intake Total 1020 ml 1260 ml


 


Output Total 1255 ml 705 ml


 


Balance -235 ml 555 ml


 


  


 


Free Water 200 ml 200 ml


 


IV Total  220 ml


 


Tube Feeding 720 ml 720 ml


 


Other 100 ml 120 ml


 


Output Urine Total 1255 ml 705 ml


 


# Bowel Movements 4 3











Laboratory Tests








Test


  9/12/20


04:00


 


White Blood Count


  11.3 K/UL


(4.8-10.8)  H


 


Red Blood Count


  2.67 M/UL


(4.20-5.40)  L


 


Hemoglobin


  8.7 G/DL


(12.0-16.0)  L


 


Hematocrit


  25.9 %


(37.0-47.0)  L


 


Mean Corpuscular Volume 97 FL (80-99)  


 


Mean Corpuscular Hemoglobin


  32.6 PG


(27.0-31.0)  H


 


Mean Corpuscular Hemoglobin


Concent 33.6 G/DL


(32.0-36.0)


 


Red Cell Distribution Width


  14.5 %


(11.6-14.8)


 


Platelet Count


  178 K/UL


(150-450)


 


Mean Platelet Volume


  8.8 FL


(6.5-10.1)


 


Neutrophils (%) (Auto)


  % (45.0-75.0)


 


 


Lymphocytes (%) (Auto)


  % (20.0-45.0)


 


 


Monocytes (%) (Auto)  % (1.0-10.0)  


 


Eosinophils (%) (Auto)  % (0.0-3.0)  


 


Basophils (%) (Auto)  % (0.0-2.0)  


 


Differential Total Cells


Counted 100  


 


 


Neutrophils % (Manual) 90 % (45-75)  H


 


Lymphocytes % (Manual) 6 % (20-45)  L


 


Monocytes % (Manual) 3 % (1-10)  


 


Eosinophils % (Manual) 1 % (0-3)  


 


Basophils % (Manual) 0 % (0-2)  


 


Band Neutrophils 0 % (0-8)  


 


Platelet Estimate Adequate  


 


Platelet Morphology Normal  


 


Hypochromasia 2+  


 


Anisocytosis 1+  


 


Spherocytes 1+  


 


Sodium Level


  150 MMOL/L


(136-145)  H


 


Potassium Level


  3.2 MMOL/L


(3.5-5.1)  L


 


Chloride Level


  117 MMOL/L


()  H


 


Carbon Dioxide Level


  29 MMOL/L


(21-32)


 


Anion Gap


  4 mmol/L


(5-15)  L


 


Blood Urea Nitrogen


  35 mg/dL


(7-18)  H


 


Creatinine


  0.9 MG/DL


(0.55-1.30)


 


Estimat Glomerular Filtration


Rate > 60 mL/min


(>60)


 


Glucose Level


  158 MG/DL


()  H


 


Calcium Level


  7.5 MG/DL


(8.5-10.1)  L


 


Phosphorus Level


  3.0 MG/DL


(2.5-4.9)


 


Magnesium Level


  1.8 MG/DL


(1.8-2.4)


 


Total Bilirubin


  0.5 MG/DL


(0.2-1.0)


 


Aspartate Amino Transf


(AST/SGOT) 40 U/L (15-37)


H


 


Alanine Aminotransferase


(ALT/SGPT) 118 U/L


(12-78)  H


 


Alkaline Phosphatase


  59 U/L


()


 


Total Protein


  4.6 G/DL


(6.4-8.2)  L


 


Albumin


  1.1 G/DL


(3.4-5.0)  L


 


Globulin 3.5 g/dL  


 


Albumin/Globulin Ratio


  0.3 (1.0-2.7)


L

















Constantine Jorgensen MD Sep 12, 2020 14:42

## 2020-09-12 NOTE — NUR
NURSE NOTES:



Turned and repositioned patient. Afebrile. IV KCL placement is still running as ordered. No 
signs or symptoms of pain or discomfort noted.

## 2020-09-12 NOTE — NUR
NURSE NOTES:



Cleaned patient for 1 moderate amount of brown soft BM. Turned and repositioned patient. O2 
sat 93-95% on FiO2 95%. 

RR 25. Will continue to monitor.

## 2020-09-12 NOTE — INFECTIOUS DISEASES PROG NOTE
Assessment/Plan








ASSESSMENT:


sp code blue 8/26





Septic Shock; SP


Fever, recurrent


Leukocytosis; recurrent; persistent


   -9/9 u/a no pyuria


   -9/8 Bcx NTD (Picc line)


   -9/6 Bcx NTD


            ucx Neg


             sp cx C. parapsilopsis


  -8/26 u/a no pyuria





Pneumonia.- COVID 19 neg x3


   Acute hypoxic resp failure on VM> NRB 15l 100%; hypoxic on ABG> now VDRF 8/26

- Fio2 80% >100% 8/28> 60% 9/1 >80% 9/2   >95% 9/10


       -9/5 CXR:Small bilateral pleural effusions with minor edema.  Stable 

edema with  mild worsening in the degree of pleural effusion on the right.


         -9/1 sp cx Neg


         8/31 CXR: Extensive bilateral interstitial and airspace disease 

appears similar to the prior exam. Moderate to large bilateral pleural 

effusions appear unchanged.


   8/28 CXR: Increasing left upper lobe dense consolidation and likely 

increasing bilateral pleural fluid. Persistent diffuse dense consolidation 

elsewhere


            -8/26 sp cx normal resp lilliam


             -8/25 CXR: Increased atelectasis of the right lung, since prior 

exam of 3 days earlier. New or increased right pleural effusion. Increased left 

basilar consolidation and/or pleural fluid 


          -COVID Rapid PCR neg 8/23, 8/23, 8/26


          -8/22 spc x Group G strep


          -8/22 CXR:   Reduced lung volumes.  Patchy bilateral predominantly 

interstitial  pulmonary opacities.  Could be from edema and/or pneumonia.  

There is a broader differential.


 


        -legionella ag urine, blasto ab, Histo ab, HIV ab screen, JOY, ANCA neg





Persistent, high grade bacteremia-


     -8/22 Bcx 4/4 sets S. haemolyticus; 8/23 Bcx 3/4 S/ epi; 8/27 Bcx 1.4 S. 

warnerri; 8/29 Bcx Neg


     -2d echo: no vegetaions seen





          ua/ wbc 10-15, nit neg, leuk +1; ucx Neg





DAVID; 


 -supratherapeutic vanco levels





-Seizure disorder.


- Hypothyroidism.


- Down syndrome.





History of PEG tube placement.


[facility]





PLAN:





-f/u repeat blood Cx sputum and Urine Cx





Cont Meropenem#16(abx d #21/21) given resp decompensation


Empiric Amikacin #5/5-7 pending repeat cultures





          9/4/20 SP Daptomycin #11


          9/2 SP MIcafungin #7, Linezolid #5


   8/28 SP Azithromycin #7/7


   8/26 SP Ceftriaxone #2


   8/25 SP IV Vancomycin #4, Zosyn #4


   8/22 SP Cefepime x1, Flagyl x1





- Monitor CBC, BMP.


.f/u  Repeat cx


- COVID neg x3


- Monitor chest x-ray.


- Monitor the patient's clinical course and labs.  Based on those, we will do 

further recommendation.


-f/u Bcx from picc line, cdiff if diarrhea


-f/u Fungitell, asp ag, flow cytometry


-Once stable, CT chest/abd/pw/ given persistent leukocytosis





Thank you, Dr. Cherry, for allowing me to participate in the care of


this patient.  I will follow the patient with you at this


hospitalization.








Discussed with RN





Subjective


Allergies:  


Coded Allergies:  


     No Known Allergies (Unverified , 1/8/19)


afebrile


leukocytosis resolving


Bcx NTD


Fio2 95%





Objective





Last 24 Hour Vital Signs








  Date Time  Temp Pulse Resp B/P (MAP) Pulse Ox O2 Delivery O2 Flow Rate FiO2


 


9/12/20 13:00  62 26 105/56 (72) 96   


 


9/12/20 12:00  64 25 106/51 (69) 96   


 


9/12/20 12:00      Mechanical Ventilator  





      Mechanical Ventilator  


 


9/12/20 12:00        95


 


9/12/20 12:00  63      


 


9/12/20 11:28  63 26     95


 


9/12/20 11:00  62 23 97/56 (70) 92   


 


9/12/20 10:00  69 23 107/54 (71) 92   


 


9/12/20 09:00  57 26 97/49 (65) 95   


 


9/12/20 08:00  58      


 


9/12/20 08:00 98.6 58 26 106/49 (68) 92   


 


9/12/20 08:00      Mechanical Ventilator  





      Mechanical Ventilator  


 


9/12/20 08:00        95


 


9/12/20 07:16  68 27     95


 


9/12/20 07:00  58 25 112/51 (71) 95   


 


9/12/20 06:00  62 25 104/47 (66) 94   


 


9/12/20 05:00  70 24 114/66 (82) 90   


 


9/12/20 04:00 98.0 59 25 107/47 (67) 92   


 


9/12/20 04:00      Mechanical Ventilator  





      Mechanical Ventilator  


 


9/12/20 04:00        95


 


9/12/20 03:41  57      


 


9/12/20 03:16  59 26     95


 


9/12/20 03:00  57 23 102/49 (66) 95   


 


9/12/20 02:00  59 24 103/56 (72) 94   


 


9/12/20 01:00  50 26 118/59 (78) 94   


 


9/12/20 00:00 98.7 51 26 121/61 (81) 95   


 


9/12/20 00:00      Mechanical Ventilator  





      Mechanical Ventilator  


 


9/12/20 00:00        95


 


9/12/20 00:00  53      


 


9/11/20 23:00  52 26 125/63 (83) 97   


 


9/11/20 22:59  57 26     95


 


9/11/20 22:00  67 26 103/52 (69) 98   


 


9/11/20 21:00  67 26 99/52 (68) 98   


 


9/11/20 20:00        95


 


9/11/20 20:00  56      


 


9/11/20 20:00 98.9 58 26 119/63 (81) 99   


 


9/11/20 20:00      Mechanical Ventilator  





      Mechanical Ventilator  


 


9/11/20 19:16  51 26     95


 


9/11/20 19:00  52 26 92/50 (64) 97   


 


9/11/20 18:00  57 26 96/53 (67) 96   


 


9/11/20 17:00  66 26 109/55 (73) 99   


 


9/11/20 16:10        95


 


9/11/20 16:06 99.0 65 26 119/60 (79) 99   


 


9/11/20 16:00  65      


 


9/11/20 16:00      Mechanical Ventilator  





      Mechanical Ventilator  


 


9/11/20 15:41  62 26     95


 


9/11/20 15:00  68 21 93/55 (68) 99   








Height (Feet):  5


Height (Inches):  3.00


Weight (Pounds):  172


HEENT:  No pale conjunctivae.  No icterus. ETT in place


NECK:  No lymphadenopathy.


CHEST:  Coarse breathing sounds.


HEART:  S1 and S2.


ABDOMEN:  Soft.  PEG tube in place.


EXTREMITIES:  No cyanosis at this time .


SKIN: no rash





Laboratory Tests








Test


  9/12/20


04:00


 


White Blood Count


  11.3 K/UL


(4.8-10.8)  H


 


Red Blood Count


  2.67 M/UL


(4.20-5.40)  L


 


Hemoglobin


  8.7 G/DL


(12.0-16.0)  L


 


Hematocrit


  25.9 %


(37.0-47.0)  L


 


Mean Corpuscular Volume 97 FL (80-99)  


 


Mean Corpuscular Hemoglobin


  32.6 PG


(27.0-31.0)  H


 


Mean Corpuscular Hemoglobin


Concent 33.6 G/DL


(32.0-36.0)


 


Red Cell Distribution Width


  14.5 %


(11.6-14.8)


 


Platelet Count


  178 K/UL


(150-450)


 


Mean Platelet Volume


  8.8 FL


(6.5-10.1)


 


Neutrophils (%) (Auto)


  % (45.0-75.0)


 


 


Lymphocytes (%) (Auto)


  % (20.0-45.0)


 


 


Monocytes (%) (Auto)  % (1.0-10.0)  


 


Eosinophils (%) (Auto)  % (0.0-3.0)  


 


Basophils (%) (Auto)  % (0.0-2.0)  


 


Differential Total Cells


Counted 100  


 


 


Neutrophils % (Manual) 90 % (45-75)  H


 


Lymphocytes % (Manual) 6 % (20-45)  L


 


Monocytes % (Manual) 3 % (1-10)  


 


Eosinophils % (Manual) 1 % (0-3)  


 


Basophils % (Manual) 0 % (0-2)  


 


Band Neutrophils 0 % (0-8)  


 


Platelet Estimate Adequate  


 


Platelet Morphology Normal  


 


Hypochromasia 2+  


 


Anisocytosis 1+  


 


Spherocytes 1+  


 


Sodium Level


  150 MMOL/L


(136-145)  H


 


Potassium Level


  3.2 MMOL/L


(3.5-5.1)  L


 


Chloride Level


  117 MMOL/L


()  H


 


Carbon Dioxide Level


  29 MMOL/L


(21-32)


 


Anion Gap


  4 mmol/L


(5-15)  L


 


Blood Urea Nitrogen


  35 mg/dL


(7-18)  H


 


Creatinine


  0.9 MG/DL


(0.55-1.30)


 


Estimat Glomerular Filtration


Rate > 60 mL/min


(>60)


 


Glucose Level


  158 MG/DL


()  H


 


Calcium Level


  7.5 MG/DL


(8.5-10.1)  L


 


Phosphorus Level


  3.0 MG/DL


(2.5-4.9)


 


Magnesium Level


  1.8 MG/DL


(1.8-2.4)


 


Total Bilirubin


  0.5 MG/DL


(0.2-1.0)


 


Aspartate Amino Transf


(AST/SGOT) 40 U/L (15-37)


H


 


Alanine Aminotransferase


(ALT/SGPT) 118 U/L


(12-78)  H


 


Alkaline Phosphatase


  59 U/L


()


 


Total Protein


  4.6 G/DL


(6.4-8.2)  L


 


Albumin


  1.1 G/DL


(3.4-5.0)  L


 


Globulin 3.5 g/dL  


 


Albumin/Globulin Ratio


  0.3 (1.0-2.7)


L











Current Medications








 Medications


  (Trade)  Dose


 Ordered  Sig/Didi


 Route


 PRN Reason  Start Time


 Stop Time Status Last Admin


Dose Admin


 


 Acetaminophen


  (Tylenol)  650 mg  Q4H  PRN


 GT


 FEVER  8/26/20 11:45


 9/25/20 11:44  9/7/20 03:17


 


 


 Amikacin Protocol


  (Amikacin


 pharmacy to dose)  1 ea  DAILY  PRN


 MISC


 Per rx protocol  9/8/20 12:00


 10/8/20 11:59   


 


 


 Amikacin Sulfate


 1000 mg/Sodium


 Chloride  114 ml @ 


 114 mls/hr  Q36H


 IV


   9/8/20 14:00


 9/15/20 13:59  9/11/20 15:23


 


 


 Chlorhexidine


 Gluconate


  (Beatrice-Hex 2%)  1 applic  DAILY@2000


 TOPIC


   8/31/20 20:00


 11/29/20 19:59  9/11/20 19:57


 


 


 Clotrimazole


  (Lotrimin)  1 applic  Q12HR


 TOPIC


   8/30/20 13:00


 11/28/20 12:59  9/12/20 08:40


 


 


 Dextrose


  (Dextrose 50%)  25 ml  Q30M  PRN


 IV


 Hypoglycemia  8/26/20 11:30


 11/20/20 11:29   


 


 


 Dextrose


  (Dextrose 50%)  50 ml  Q30M  PRN


 IV


 Hypoglycemia  8/26/20 11:30


 11/20/20 11:29   


 


 


 Dopamine HCl/


 Dextrose  250 ml @ 0


 mls/hr  Q24H


 IV


   9/4/20 11:15


 12/3/20 11:14  9/6/20 19:47


 


 


 Hydrocortisone


  (Solu-CORTEF)  100 mg  EVERY 8  HOURS


 IV


   8/30/20 14:00


 11/28/20 13:59  9/12/20 13:19


 


 


 Levothyroxine


 Sodium


  (Synthroid)  75 mcg  DAILY


 GT


   8/27/20 09:00


 9/22/20 08:59  9/12/20 08:40


 


 


 Meropenem 1 gm/


 Sodium Chloride  55 ml @ 


 110 mls/hr  Q8H


 IVPB


   9/6/20 12:00


 9/16/20 23:59  9/12/20 11:26


 


 


 Midodrine


  (Pro-Amatine)  10 mg  Q8HR


 GT


   8/28/20 14:00


 11/26/20 13:59  9/12/20 13:19


 


 


 Norepinephrine


 Bitartrate 16 mg/


 Dextrose  500 ml @ 0


 mls/hr  Q24H


 IV


   8/31/20 07:45


 9/29/20 12:59  9/4/20 08:43


 


 


 Ondansetron HCl


  (Zofran)  4 mg  Q6H  PRN


 IVP


 Nausea & Vomiting  8/26/20 12:00


 9/21/20 11:59   


 


 


 Pantoprazole


  (Protonix)  40 mg  DAILY


 IV


   8/27/20 09:00


 9/22/20 08:59  9/12/20 08:39


 


 


 Phenylephrine HCl


 50 mg/Dextrose  250 ml @ 0


 mls/hr  Q24H  PRN


 IV


 For hypotension  8/29/20 17:45


 9/28/20 17:44  8/31/20 01:49


 


 


 Polyethylene


 Glycol


  (Miralax)  17 gm  DAILYPRN  PRN


 GT


 Constipation  8/26/20 12:00


 9/21/20 11:59   


 


 


 Valproic Acid


  (Depakene)  500 mg  EVERY 8  HOURS


 GT


   8/26/20 14:00


 9/21/20 21:59  9/12/20 13:19


 

















Rema Gomes M.D. Sep 12, 2020 14:06

## 2020-09-13 VITALS — SYSTOLIC BLOOD PRESSURE: 123 MMHG | DIASTOLIC BLOOD PRESSURE: 93 MMHG

## 2020-09-13 VITALS — SYSTOLIC BLOOD PRESSURE: 114 MMHG | DIASTOLIC BLOOD PRESSURE: 85 MMHG

## 2020-09-13 VITALS — DIASTOLIC BLOOD PRESSURE: 71 MMHG | SYSTOLIC BLOOD PRESSURE: 145 MMHG

## 2020-09-13 VITALS — DIASTOLIC BLOOD PRESSURE: 59 MMHG | SYSTOLIC BLOOD PRESSURE: 106 MMHG

## 2020-09-13 VITALS — DIASTOLIC BLOOD PRESSURE: 56 MMHG | SYSTOLIC BLOOD PRESSURE: 101 MMHG

## 2020-09-13 VITALS — SYSTOLIC BLOOD PRESSURE: 116 MMHG | DIASTOLIC BLOOD PRESSURE: 72 MMHG

## 2020-09-13 VITALS — SYSTOLIC BLOOD PRESSURE: 128 MMHG | DIASTOLIC BLOOD PRESSURE: 64 MMHG

## 2020-09-13 VITALS — DIASTOLIC BLOOD PRESSURE: 72 MMHG | SYSTOLIC BLOOD PRESSURE: 120 MMHG

## 2020-09-13 VITALS — DIASTOLIC BLOOD PRESSURE: 59 MMHG | SYSTOLIC BLOOD PRESSURE: 114 MMHG

## 2020-09-13 VITALS — DIASTOLIC BLOOD PRESSURE: 90 MMHG | SYSTOLIC BLOOD PRESSURE: 114 MMHG

## 2020-09-13 VITALS — SYSTOLIC BLOOD PRESSURE: 109 MMHG | DIASTOLIC BLOOD PRESSURE: 54 MMHG

## 2020-09-13 VITALS — SYSTOLIC BLOOD PRESSURE: 115 MMHG | DIASTOLIC BLOOD PRESSURE: 29 MMHG

## 2020-09-13 VITALS — SYSTOLIC BLOOD PRESSURE: 140 MMHG | DIASTOLIC BLOOD PRESSURE: 69 MMHG

## 2020-09-13 VITALS — SYSTOLIC BLOOD PRESSURE: 133 MMHG | DIASTOLIC BLOOD PRESSURE: 73 MMHG

## 2020-09-13 VITALS — SYSTOLIC BLOOD PRESSURE: 133 MMHG | DIASTOLIC BLOOD PRESSURE: 82 MMHG

## 2020-09-13 VITALS — SYSTOLIC BLOOD PRESSURE: 153 MMHG | DIASTOLIC BLOOD PRESSURE: 77 MMHG

## 2020-09-13 VITALS — DIASTOLIC BLOOD PRESSURE: 91 MMHG | SYSTOLIC BLOOD PRESSURE: 115 MMHG

## 2020-09-13 VITALS — SYSTOLIC BLOOD PRESSURE: 109 MMHG | DIASTOLIC BLOOD PRESSURE: 61 MMHG

## 2020-09-13 VITALS — DIASTOLIC BLOOD PRESSURE: 102 MMHG | SYSTOLIC BLOOD PRESSURE: 128 MMHG

## 2020-09-13 VITALS — DIASTOLIC BLOOD PRESSURE: 72 MMHG | SYSTOLIC BLOOD PRESSURE: 96 MMHG

## 2020-09-13 VITALS — SYSTOLIC BLOOD PRESSURE: 144 MMHG | DIASTOLIC BLOOD PRESSURE: 70 MMHG

## 2020-09-13 VITALS — SYSTOLIC BLOOD PRESSURE: 106 MMHG | DIASTOLIC BLOOD PRESSURE: 80 MMHG

## 2020-09-13 VITALS — SYSTOLIC BLOOD PRESSURE: 138 MMHG | DIASTOLIC BLOOD PRESSURE: 68 MMHG

## 2020-09-13 VITALS — SYSTOLIC BLOOD PRESSURE: 111 MMHG | DIASTOLIC BLOOD PRESSURE: 89 MMHG

## 2020-09-13 VITALS — DIASTOLIC BLOOD PRESSURE: 59 MMHG | SYSTOLIC BLOOD PRESSURE: 113 MMHG

## 2020-09-13 LAB
ADD MANUAL DIFF: YES
ALBUMIN SERPL-MCNC: 1.1 G/DL (ref 3.4–5)
ALBUMIN/GLOB SERPL: 0.3 {RATIO} (ref 1–2.7)
ALP SERPL-CCNC: 54 U/L (ref 46–116)
ALT SERPL-CCNC: 77 U/L (ref 12–78)
ANION GAP SERPL CALC-SCNC: 8 MMOL/L (ref 5–15)
AST SERPL-CCNC: 27 U/L (ref 15–37)
BILIRUB SERPL-MCNC: 0.5 MG/DL (ref 0.2–1)
BUN SERPL-MCNC: 33 MG/DL (ref 7–18)
CALCIUM SERPL-MCNC: 7.1 MG/DL (ref 8.5–10.1)
CHLORIDE SERPL-SCNC: 120 MMOL/L (ref 98–107)
CO2 SERPL-SCNC: 27 MMOL/L (ref 21–32)
CREAT SERPL-MCNC: 0.8 MG/DL (ref 0.55–1.3)
ERYTHROCYTE [DISTWIDTH] IN BLOOD BY AUTOMATED COUNT: 15 % (ref 11.6–14.8)
GLOBULIN SER-MCNC: 3.3 G/DL
HCT VFR BLD CALC: 24.4 % (ref 37–47)
HGB BLD-MCNC: 8.1 G/DL (ref 12–16)
MCV RBC AUTO: 98 FL (ref 80–99)
PHOSPHATE SERPL-MCNC: 2.3 MG/DL (ref 2.5–4.9)
PLATELET # BLD: 164 K/UL (ref 150–450)
POTASSIUM SERPL-SCNC: 3.1 MMOL/L (ref 3.5–5.1)
RBC # BLD AUTO: 2.49 M/UL (ref 4.2–5.4)
SODIUM SERPL-SCNC: 155 MMOL/L (ref 136–145)
WBC # BLD AUTO: 12 K/UL (ref 4.8–10.8)

## 2020-09-13 RX ADMIN — HYDROCORTISONE SODIUM SUCCINATE SCH MG: 100 INJECTION, POWDER, FOR SOLUTION INTRAMUSCULAR; INTRAVENOUS at 21:44

## 2020-09-13 RX ADMIN — SODIUM CHLORIDE SCH MLS/HR: 900 INJECTION, SOLUTION INTRAVENOUS at 07:07

## 2020-09-13 RX ADMIN — MEROPENEM SCH MLS/HR: 1 INJECTION INTRAVENOUS at 03:31

## 2020-09-13 RX ADMIN — MEROPENEM SCH MLS/HR: 1 INJECTION INTRAVENOUS at 19:47

## 2020-09-13 RX ADMIN — HYDROCORTISONE SODIUM SUCCINATE SCH MG: 100 INJECTION, POWDER, FOR SOLUTION INTRAMUSCULAR; INTRAVENOUS at 14:36

## 2020-09-13 RX ADMIN — MIDODRINE HYDROCHLORIDE SCH MG: 10 TABLET ORAL at 14:35

## 2020-09-13 RX ADMIN — DOPAMINE HYDROCHLORIDE IN DEXTROSE SCH MLS/HR: 1.6 INJECTION, SOLUTION INTRAVENOUS at 11:15

## 2020-09-13 RX ADMIN — MIDODRINE HYDROCHLORIDE SCH MG: 10 TABLET ORAL at 05:30

## 2020-09-13 RX ADMIN — AMIKACIN SULFATE SCH MLS/HR: 500 INJECTION, SOLUTION INTRAMUSCULAR; INTRAVENOUS at 01:51

## 2020-09-13 RX ADMIN — VALPROIC ACID SCH MG: 250 SOLUTION ORAL at 14:35

## 2020-09-13 RX ADMIN — MIDODRINE HYDROCHLORIDE SCH MG: 10 TABLET ORAL at 21:44

## 2020-09-13 RX ADMIN — CHLORHEXIDINE GLUCONATE SCH APPLIC: 213 SOLUTION TOPICAL at 19:47

## 2020-09-13 RX ADMIN — MEROPENEM SCH MLS/HR: 1 INJECTION INTRAVENOUS at 11:55

## 2020-09-13 RX ADMIN — VALPROIC ACID SCH MG: 250 SOLUTION ORAL at 05:30

## 2020-09-13 RX ADMIN — HYDROCORTISONE SODIUM SUCCINATE SCH MG: 100 INJECTION, POWDER, FOR SOLUTION INTRAMUSCULAR; INTRAVENOUS at 05:30

## 2020-09-13 RX ADMIN — VALPROIC ACID SCH MG: 250 SOLUTION ORAL at 21:44

## 2020-09-13 RX ADMIN — PANTOPRAZOLE SODIUM SCH MG: 40 INJECTION, POWDER, FOR SOLUTION INTRAVENOUS at 09:01

## 2020-09-13 NOTE — NUR
NURSE NOTES:



Report received from Nirav Joy RN. Patient is sleeping in bed. Eyes follow. Oriented to 
name. Non-verbal. Unable to follow commands. Afebrile. SB on cardiac monitor in HR 50's. ETT 
7.5/22 cm at lip line. AC 26, , FiO2 95%, P 10. O2 sat 95-96%. GT in place, receiving 
Vital AF 1.2 at 60cc/hr. No residual noted. Pitting +2 edema on bilateral upper extremitas 
and feet noted. Valle in place draining to gravity. CARLOS PICC line patent and asymptomatic, 
open for TKO. Bed in lowest position. Side rails up x3. Will resume plan of care.

## 2020-09-13 NOTE — NUR
NURSE NOTES:



Patient is sleeping. Turned and repositioned patient. Oral care done. No signs of pain or 
discomfort noted. O2 sat 92% on FiO2 80%. SB with HR 47 on cardiac monitor. Will continue to 
monitor.

## 2020-09-13 NOTE — NUR
NURSE NOTES:

PATIENT CALM, VSS, TOLERATED G TUBE FEEDING, KEPT HOB 30 DEGREES AND ASPIRATION PRECAUTION, 
WILL CONTINUE TO MONITOR.

## 2020-09-13 NOTE — PULMONOLGY CRITICAL CARE NOTE
Critical Care - Asmt/Plan


Problems:  


(1) Acute respiratory failure


(2) Bacteremia


(3) Pneumonia


(4) Sepsis


(5) HCAP (healthcare-associated pneumonia)


(6) Seizure disorder


(7) Down's syndrome


Respiratory:  monitor respiratory rate, adjust FIO2


Cardiac:  continue to monitor HR/BP


Renal:  F/U I&O, keep IV fluid, check electrolytes


Infectious Disease:  check cultures


Gastrointestinal:  continue feedings/current rate


Endocrine:  monitor blood sugar, continue sliding scale insulin


Hematologic:  transfuse if hgb<8.5


Neurologic:  PRN Ativan, keep patient comfortable


Affect:  PRN ativan


Time Spent (Minutes):  40


Notes Reviewed:  cardio, renal


Discussed with:  nurses, consultants, 





Critical Care - Objective





Last 24 Hour Vital Signs








  Date Time  Temp Pulse Resp B/P (MAP) Pulse Ox O2 Delivery O2 Flow Rate FiO2


 


9/13/20 08:10        80


 


9/13/20 07:26  51 26     80


 


9/13/20 07:00  47 26 140/69 (92) 99   


 


9/13/20 06:13  57 25 128/64 (85) 99   


 


9/13/20 06:00  55 26 128/102 (111) 99   


 


9/13/20 05:00  68 23 114/85 (95) 98   


 


9/13/20 04:00      Mechanical Ventilator  





      Mechanical Ventilator  


 


9/13/20 04:00 98.9 72 24 114/90 (98) 99   


 


9/13/20 04:00  72      


 


9/13/20 04:00        95


 


9/13/20 03:30  48 26     95


 


9/13/20 03:00  70 24 153/77 (102) 99   


 


9/13/20 02:00  64 24 106/80 (89) 99   


 


9/13/20 01:00  62 23 109/61 (77) 97   


 


9/13/20 00:05  64 26     95


 


9/13/20 00:00 98.6 60 24 106/59 (75) 98   


 


9/13/20 00:00      Mechanical Ventilator  





      Mechanical Ventilator  


 


9/13/20 00:00  60      


 


9/13/20 00:00        95


 


9/12/20 23:00  61 22 111/67 (82) 98   


 


9/12/20 22:00  64 21 105/64 (78) 99   


 


9/12/20 21:00  58 25 105/56 (72) 98   


 


9/12/20 20:00 98.7 58 26 98/52 (67) 96   


 


9/12/20 20:00        95


 


9/12/20 20:00      Mechanical Ventilator  





      Mechanical Ventilator  


 


9/12/20 19:29  59      


 


9/12/20 19:25  66 26     95


 


9/12/20 19:00  59 25 108/54 (72) 96   


 


9/12/20 19:00  71 24 95/49 (64) 97   


 


9/12/20 18:00  71 24 95/49 (64) 97   


 


9/12/20 17:00  61 26 98/59 (72) 95   


 


9/12/20 16:00  60      


 


9/12/20 16:00        95


 


9/12/20 16:00 98.9 53 26 105/72 (83) 95   


 


9/12/20 16:00      Mechanical Ventilator  





      Mechanical Ventilator  


 


9/12/20 15:28  69 26     95


 


9/12/20 15:00  47 26 94/52 (66) 94   


 


9/12/20 14:00  51 26 101/52 (68) 94   


 


9/12/20 13:00  62 26 105/56 (72) 96   


 


9/12/20 12:00 98.8 64 25 106/51 (69) 96   


 


9/12/20 12:00      Mechanical Ventilator  





      Mechanical Ventilator  


 


9/12/20 12:00        95


 


9/12/20 12:00  63      


 


9/12/20 11:28  63 26     95


 


9/12/20 11:00  62 23 97/56 (70) 92   


 


9/12/20 10:00  69 23 107/54 (71) 92   








Status:  awake


Condition:  critical


HEENT:  atraumatic, normocephalic


Lungs:  rales, rhonchi


Heart:  HR/BP stable


Abdomen:  soft


Extremities:  no C/C/E


Accucheck:  131





Critical Care - Subjective


ROS Limited/Unobtainable:  Yes


FI02:  80


Vent Support Breath Rate:  26


Vent Support Mode:  AC


Vent Tidal Volume:  500


Sputum Amount:  Moderate


PEEP:  10.0


PIP:  49


Tube Feeding Amount:  60


I&O:











Intake and Output  


 


 9/12/20 9/13/20





 19:00 07:00


 


Intake Total 905 ml 1204 ml


 


Output Total 730 ml 800 ml


 


Balance 175 ml 404 ml


 


  


 


IV Total 255 ml 224 ml


 


Tube Feeding 600 ml 780 ml


 


Other 50 ml 200 ml


 


Output Urine Total 730 ml 800 ml


 


# Bowel Movements 3 








ET-Tube:  7.5


ET Position:  22


Labs:





Laboratory Tests








Test


  9/13/20


05:35


 


White Blood Count


  12.0 K/UL


(4.8-10.8)  H


 


Red Blood Count


  2.49 M/UL


(4.20-5.40)  L


 


Hemoglobin


  8.1 G/DL


(12.0-16.0)  L


 


Hematocrit


  24.4 %


(37.0-47.0)  L


 


Mean Corpuscular Volume 98 FL (80-99)  


 


Mean Corpuscular Hemoglobin


  32.7 PG


(27.0-31.0)  H


 


Mean Corpuscular Hemoglobin


Concent 33.4 G/DL


(32.0-36.0)


 


Red Cell Distribution Width


  15.0 %


(11.6-14.8)  H


 


Platelet Count


  164 K/UL


(150-450)


 


Mean Platelet Volume


  8.8 FL


(6.5-10.1)


 


Neutrophils (%) (Auto)


  % (45.0-75.0)


 


 


Lymphocytes (%) (Auto)


  % (20.0-45.0)


 


 


Monocytes (%) (Auto)  % (1.0-10.0)  


 


Eosinophils (%) (Auto)  % (0.0-3.0)  


 


Basophils (%) (Auto)  % (0.0-2.0)  


 


Neutrophils % (Manual) Pending  


 


Lymphocytes % (Manual) Pending  


 


Platelet Estimate Pending  


 


Platelet Morphology Pending  


 


Erythrocyte Sedimentation Rate Pending  


 


Sodium Level


  155 MMOL/L


(136-145)  H


 


Potassium Level


  3.1 MMOL/L


(3.5-5.1)  L


 


Chloride Level


  120 MMOL/L


()  H


 


Carbon Dioxide Level


  27 MMOL/L


(21-32)


 


Anion Gap


  8 mmol/L


(5-15)


 


Blood Urea Nitrogen


  33 mg/dL


(7-18)  H


 


Creatinine


  0.8 MG/DL


(0.55-1.30)


 


Estimat Glomerular Filtration


Rate > 60 mL/min


(>60)


 


Glucose Level


  182 MG/DL


()  H


 


Calcium Level


  7.1 MG/DL


(8.5-10.1)  L


 


Phosphorus Level


  2.3 MG/DL


(2.5-4.9)  L


 


Magnesium Level


  1.8 MG/DL


(1.8-2.4)


 


Total Bilirubin


  0.5 MG/DL


(0.2-1.0)


 


Aspartate Amino Transf


(AST/SGOT) 27 U/L (15-37)


 


 


Alanine Aminotransferase


(ALT/SGPT) 77 U/L (12-78)


 


 


Alkaline Phosphatase


  54 U/L


()


 


C-Reactive Protein,


Quantitative 1.9 mg/dL


(0.00-0.90)  H


 


Total Protein


  4.4 G/DL


(6.4-8.2)  L


 


Albumin


  1.1 G/DL


(3.4-5.0)  L


 


Globulin 3.3 g/dL  


 


Albumin/Globulin Ratio


  0.3 (1.0-2.7)


L

















Chano Cherry MD Sep 13, 2020 09:26

## 2020-09-13 NOTE — NUR
NURSE NOTES:



Turned and repositioned patient. Oral care done. Suctioned small amount of clear and white 
thick secretion. Will continue to monitor.

## 2020-09-13 NOTE — NUR
NURSE NOTES:



Dr Jorgensen here to see the patient. Updated him with patient's current condition. Notified 
him that patient does arnold lowest 47 when she sleeps. No new orders.

## 2020-09-13 NOTE — NUR
NURSE HAND-OFF REPORT: 



Latest Vital Signs: Temperature 98.1 , Pulse 51 , B/P 120 /72 , Respiratory Rate 26 , O2 SAT 
94 , Mechanical Ventilator, O2 Flow Rate 15.0 .  

Vital Sign Comment: 



EKG Rhythm: Sinus Bradycardia

Rhythm change?: N 

MD Notified?: -

MD Response: 



Latest Momin Fall Score: 70  

Fall Risk: High Risk 

Safety Measures: Call light Within Reach, Bed Alarm Zone 1, Side Rails Side Rails x3, Bed 
position Low and Locked.

Fall Precautions: 

Yellow Socks

Door Sign

Patient Fall Education



Report given to Nirav Joy RN .

## 2020-09-13 NOTE — NUR
NURSE HAND-OFF REPORT: 



Latest Vital Signs: Temperature 98.9 , Pulse 57 , B/P 128 /64 , Respiratory Rate 25 , O2 SAT 
99 , Mechanical Ventilator, O2 Flow Rate 15.0 .  

Vital Sign Comment: 



EKG Rhythm: Sinus Bradycardia

Rhythm change?: N 

MD Notified?: -

MD Response: 



Latest Momin Fall Score: 70  

Fall Risk: High Risk 

Safety Measures: Call light Within Reach, Bed Alarm Zone 1, Side Rails Side Rails x3, Bed 
position Low and Locked.

Fall Precautions: 

Yellow Socks

Door Sign

Patient Fall Education



Report given to MIYEON,RN.

## 2020-09-13 NOTE — NUR
NURSE NOTES:



Turned and repositioned patient. Oral are done. /71, HR 58, O2 sat 95% on FiO2 80%. No 
signs and symptoms of pain or discomfort noted. Will continue to monitor.

## 2020-09-13 NOTE — NUR
NURSE NOTES:



Cleaned patient for small amount of soft brown BM. Turned and repositioned patient. Will 
continue to monitor.

## 2020-09-13 NOTE — NEPHROLOGY PROGRESS NOTE
Assessment/Plan


Problem List:  


(1) DAVID (acute kidney injury)


(2) Respiratory failure requiring intubation


(3) Down's syndrome


(4) Seizure disorder


(5) Hypothyroidism


Assessment





Acute renal failure, likely due to hypotension


Acute respiratory distress, hypoxia


Seizure disorder


Hypothyroidism


Down syndrome


Full code











Fluid challenge with IV fluids and albumin


Midodrine for BP above 100 systolic


Check TSH level


Check


Correct level


Monitor renal parameters


Urine studies


Per orders


Plan


September 13: Lab reviewed.  Abnormal electrolytes noted and addressed.


September 12: Labs reviewed.  Potassium supplement given.  Patient remains full 

code.  Continue per consultants.


September 11: Lab reviewed.  Electrolyte abnormalities addressed.  Continue per 

pulmonary and ID.


September 10: Lab reviewed.  Status unchanged.  Serum sodium 151 unchanged.  

Stable from renal standpoint of view.


September 9: Labs reviewed.  Status quo.  D5W 500 cc IV ordered.  Continue to 

monitor renal parameters.


September 8: Status quo.  Labs reviewed.  Overall condition unchanged.  Patient 

was transfused and hemoglobin higher.  Continue current management.  Patient 

remains full code.


September 7: Status quo.  Overall condition poor.  Very low albumin.  

Edematous.  Hypotensive.  Hemoglobin lower.  Anemia work-up ordered.  I favor 

transfusion 2 units of packed RBCs.  Patient remains full code.  I favor 

supportive care only.  Will discuss.


September 6: Electrolyte abnormalities addressed.  Serum creatinine lower.  

Continue per current management.


September 5: Status unchanged.  Lab reviewed.  Serum potassium 2.7.  IV 

potassium chloride ordered.  Serum creatinine low at 1.6 stable.  Blood 

pressure 90s systolic


September 4: Status quo.  Labs reviewed.  Renal parameters stable.  Serum 

creatinine down to 1.6.  Medication list reviewed.  Continues to be on 

midodrine.  Continue per consultants.


September 3: Status quo.  Labs reviewed.  Electrolytes adjusted.  Serum 

creatinine down to 1.8.  Continue per consultants.


September 2: Status quo.  Labs reviewed.  Phosphorus supplement IV given.  

Serum creatinine 2.  Continue per consultants.


September 1: Requires less pressors.  Albumin bolus given.  1 dose of Lasix IV 

ordered as the patient severely edematous.  Patient serum albumin is very low.  

Continue per consultants.


August 31: Continues to be intubated.  Labs reviewed.  Serum creatinine 1.9 

unchanged.  Blood pressure more stable.  Off 1 of the pressors.  Continue to 

monitor renal parameters.  Continue per consultants.  Patient now on 

hydrocortisone 100 mg every 8 hours.  Will decrease IV fluid.  Normal saline 

down to 50 cc an hour.


August 30: Intubated.  Labs reviewed.  Creatinine 1.9 unchanged.  Continue same 

treatment plan.  Per consultants.  Overall poor prognosis since the patient 

remains on pressors and her pulmonary status is worsening.


August 29: Remains intubated.  Labs reviewed.  Creatinine 1.9.  Blood pressure 

systolic 90s.  Continue per consultants.


August 28: Remains intubated.  Labs reviewed.  Serum creatinine lower to 2.  

Vancomycin level lower.  Remains hypotensive on pressors.  Will increase 

midodrine to 10 mg every 8 hours.  Continue per consultants.  Continue to 

monitor renal parameters.


August 27: Patient now in ICU.  Intubated.  On pressors.  Labs reviewed.  Will 

increase midodrine.  Aim to keep blood pressure over 100 systolic.  Will give 

albumin bolus.  Will check vancomycin level which was elevated when checked 

previously on August 24.  Will monitor renal parameters.  Continue per 

consultants.





Subjective


ROS Limited/Unobtainable:  Yes





Objective


Objective





Last 24 Hour Vital Signs








  Date Time  Temp Pulse Resp B/P (MAP) Pulse Ox O2 Delivery O2 Flow Rate FiO2


 


9/13/20 12:00        80


 


9/13/20 12:00      Mechanical Ventilator  





      Mechanical Ventilator  


 


9/13/20 12:00  58 21 145/71 (95) 95   


 


9/13/20 11:39  59 26     80


 


9/13/20 11:00  56 23 138/68 (91) 94   


 


9/13/20 10:00  60 23 123/93 (103) 95   


 


9/13/20 09:00  51 26 115/91 (99) 95   


 


9/13/20 08:10        80


 


9/13/20 08:00 97.9 49 26 144/70 (94) 98   


 


9/13/20 08:00      Mechanical Ventilator  





      Mechanical Ventilator  


 


9/13/20 07:43  52      


 


9/13/20 07:26  51 26     80


 


9/13/20 07:00  47 26 140/69 (92) 99   


 


9/13/20 06:13  57 25 128/64 (85) 99   


 


9/13/20 06:00  55 26 128/102 (111) 99   


 


9/13/20 05:00  68 23 114/85 (95) 98   


 


9/13/20 04:00      Mechanical Ventilator  





      Mechanical Ventilator  


 


9/13/20 04:00 98.9 72 24 114/90 (98) 99   


 


9/13/20 04:00  72      


 


9/13/20 04:00        95


 


9/13/20 03:30  48 26     95


 


9/13/20 03:00  70 24 153/77 (102) 99   


 


9/13/20 02:00  64 24 106/80 (89) 99   


 


9/13/20 01:00  62 23 109/61 (77) 97   


 


9/13/20 00:05  64 26     95


 


9/13/20 00:00 98.6 60 24 106/59 (75) 98   


 


9/13/20 00:00      Mechanical Ventilator  





      Mechanical Ventilator  


 


9/13/20 00:00  60      


 


9/13/20 00:00        95


 


9/12/20 23:00  61 22 111/67 (82) 98   


 


9/12/20 22:00  64 21 105/64 (78) 99   


 


9/12/20 21:00  58 25 105/56 (72) 98   


 


9/12/20 20:00 98.7 58 26 98/52 (67) 96   


 


9/12/20 20:00        95


 


9/12/20 20:00      Mechanical Ventilator  





      Mechanical Ventilator  


 


9/12/20 19:29  59      


 


9/12/20 19:25  66 26     95


 


9/12/20 19:00  59 25 108/54 (72) 96   


 


9/12/20 19:00  71 24 95/49 (64) 97   


 


9/12/20 18:00  71 24 95/49 (64) 97   


 


9/12/20 17:00  61 26 98/59 (72) 95   


 


9/12/20 16:00  60      


 


9/12/20 16:00        95


 


9/12/20 16:00 98.9 53 26 105/72 (83) 95   


 


9/12/20 16:00      Mechanical Ventilator  





      Mechanical Ventilator  


 


9/12/20 15:28  69 26     95


 


9/12/20 15:00  47 26 94/52 (66) 94   

















Intake and Output  


 


 9/12/20 9/13/20





 19:00 07:00


 


Intake Total 905 ml 1204 ml


 


Output Total 730 ml 800 ml


 


Balance 175 ml 404 ml


 


  


 


IV Total 255 ml 224 ml


 


Tube Feeding 600 ml 780 ml


 


Other 50 ml 200 ml


 


Output Urine Total 730 ml 800 ml


 


# Bowel Movements 3 








Laboratory Tests


9/13/20 05:35: 


White Blood Count 12.0H, Red Blood Count 2.49L, Hemoglobin 8.1L, Hematocrit 

24.4L, Mean Corpuscular Volume 98, Mean Corpuscular Hemoglobin 32.7H, Mean 

Corpuscular Hemoglobin Concent 33.4, Red Cell Distribution Width 15.0H, 

Platelet Count 164, Mean Platelet Volume 8.8, Neutrophils (%) (Auto) , 

Lymphocytes (%) (Auto) , Monocytes (%) (Auto) , Eosinophils (%) (Auto) , 

Basophils (%) (Auto) , Differential Total Cells Counted 100, Neutrophils % (

Manual) 89H, Lymphocytes % (Manual) 7L, Monocytes % (Manual) 4, Eosinophils % (

Manual) 0, Basophils % (Manual) 0, Band Neutrophils 0, Platelet Estimate 

Adequate, Platelet Morphology Normal, Hypochromasia 2+, Anisocytosis 1+, 

Erythrocyte Sedimentation Rate 35H, Sodium Level 155H, Potassium Level 3.1L, 

Chloride Level 120H, Carbon Dioxide Level 27, Anion Gap 8, Blood Urea Nitrogen 

33H, Creatinine 0.8, Estimat Glomerular Filtration Rate > 60, Glucose Level 182H

, Calcium Level 7.1L, Phosphorus Level 2.3L, Magnesium Level 1.8, Total 

Bilirubin 0.5, Aspartate Amino Transf (AST/SGOT) 27, Alanine Aminotransferase (

ALT/SGPT) 77, Alkaline Phosphatase 54, C-Reactive Protein, Quantitative 1.9H, 

Total Protein 4.4L, Albumin 1.1L, Globulin 3.3, Albumin/Globulin Ratio 0.3L


Height (Feet):  5


Height (Inches):  3.00


Weight (Pounds):  172


General Appearance:  no apparent distress


EENT:  other - Intubated on ventilator


Cardiovascular:  normal rate


Respiratory/Chest:  decreased breath sounds


Abdomen:  soft











Lam Nino MD Sep 13, 2020 14:04

## 2020-09-13 NOTE — INTERNAL MED PROGRESS NOTE
Subjective


Date of Service:  Sep 13, 2020


Physician Name


Sanya Schultz


Attending Physician


Chano Cherry MD





Current Medications








 Medications


  (Trade)  Dose


 Ordered  Sig/Didi


 Route


 PRN Reason  Start Time


 Stop Time Status Last Admin


Dose Admin


 


 Acetaminophen


  (Tylenol)  650 mg  Q4H  PRN


 GT


 FEVER  8/26/20 11:45


 9/25/20 11:44  9/7/20 03:17


 


 


 Amikacin Protocol


  (Amikacin


 pharmacy to dose)  1 ea  DAILY  PRN


 MISC


 Per rx protocol  9/8/20 12:00


 10/8/20 11:59   


 


 


 Amikacin Sulfate


 1000 mg/Sodium


 Chloride  114 ml @ 


 114 mls/hr  Q36H


 IV


   9/8/20 14:00


 9/15/20 13:59  9/13/20 01:51


 


 


 Chlorhexidine


 Gluconate


  (Beatrice-Hex 2%)  1 applic  DAILY@2000


 TOPIC


   8/31/20 20:00


 11/29/20 19:59  9/12/20 19:44


 


 


 Clotrimazole


  (Lotrimin)  1 applic  Q12HR


 TOPIC


   8/30/20 13:00


 11/28/20 12:59  9/13/20 09:01


 


 


 Dextrose


  (Dextrose 50%)  25 ml  Q30M  PRN


 IV


 Hypoglycemia  8/26/20 11:30


 11/20/20 11:29   


 


 


 Dextrose


  (Dextrose 50%)  50 ml  Q30M  PRN


 IV


 Hypoglycemia  8/26/20 11:30


 11/20/20 11:29   


 


 


 Dopamine HCl/


 Dextrose  250 ml @ 0


 mls/hr  Q24H


 IV


   9/4/20 11:15


 12/3/20 11:14  9/6/20 19:47


 


 


 Hydrocortisone


  (Solu-CORTEF)  100 mg  EVERY 8  HOURS


 IV


   8/30/20 14:00


 11/28/20 13:59  9/13/20 14:36


 


 


 Levothyroxine


 Sodium


  (Synthroid)  75 mcg  DAILY


 GT


   8/27/20 09:00


 9/22/20 08:59  9/13/20 09:01


 


 


 Meropenem 1 gm/


 Sodium Chloride  55 ml @ 


 110 mls/hr  Q8H


 IVPB


   9/6/20 12:00


 9/16/20 23:59  9/13/20 11:55


 


 


 Midodrine


  (Pro-Amatine)  10 mg  Q8HR


 GT


   8/28/20 14:00


 11/26/20 13:59  9/13/20 14:35


 


 


 Norepinephrine


 Bitartrate 16 mg/


 Dextrose  500 ml @ 0


 mls/hr  Q24H


 IV


   8/31/20 07:45


 9/29/20 12:59  9/4/20 08:43


 


 


 Ondansetron HCl


  (Zofran)  4 mg  Q6H  PRN


 IVP


 Nausea & Vomiting  8/26/20 12:00


 9/21/20 11:59   


 


 


 Pantoprazole


  (Protonix)  40 mg  DAILY


 IV


   8/27/20 09:00


 9/22/20 08:59  9/13/20 09:01


 


 


 Phenylephrine HCl


 50 mg/Dextrose  250 ml @ 0


 mls/hr  Q24H  PRN


 IV


 For hypotension  8/29/20 17:45


 9/28/20 17:44  8/31/20 01:49


 


 


 Polyethylene


 Glycol


  (Miralax)  17 gm  DAILYPRN  PRN


 GT


 Constipation  8/26/20 12:00


 9/21/20 11:59   


 


 


 Potassium


 Phosphate 20 mm/


 Sodium Chloride  281.6667


 ml @ 


 46.944 m...  ONCE  ONCE


 IV


   9/13/20 14:00


 9/13/20 19:59  9/13/20 14:35


 


 


 Valproic Acid


  (Depakene)  500 mg  EVERY 8  HOURS


 GT


   8/26/20 14:00


 9/21/20 21:59  9/13/20 14:35


 








Allergies:  


Coded Allergies:  


     No Known Allergies (Unverified , 1/8/19)


ROS Limited/Unobtainable:  Yes


Subjective


60 YO F with Down's syndrome admitted with hypoxia.  Now sepsis and pneumonia.  

Cover for Int Med-DR Hung.  ICU.  Intubated and sedated





Objective





Last Vital Signs








  Date Time  Temp Pulse Resp B/P (MAP) Pulse Ox O2 Delivery O2 Flow Rate FiO2


 


9/13/20 15:33  75 26     80


 


9/13/20 12:00      Mechanical Ventilator  





      Mechanical Ventilator  


 


9/13/20 12:00    145/71 (95) 95   


 


9/13/20 08:00 97.9       











Laboratory Tests








Test


  9/13/20


05:35


 


White Blood Count


  12.0 K/UL


(4.8-10.8)  H


 


Red Blood Count


  2.49 M/UL


(4.20-5.40)  L


 


Hemoglobin


  8.1 G/DL


(12.0-16.0)  L


 


Hematocrit


  24.4 %


(37.0-47.0)  L


 


Mean Corpuscular Volume 98 FL (80-99)  


 


Mean Corpuscular Hemoglobin


  32.7 PG


(27.0-31.0)  H


 


Mean Corpuscular Hemoglobin


Concent 33.4 G/DL


(32.0-36.0)


 


Red Cell Distribution Width


  15.0 %


(11.6-14.8)  H


 


Platelet Count


  164 K/UL


(150-450)


 


Mean Platelet Volume


  8.8 FL


(6.5-10.1)


 


Neutrophils (%) (Auto)


  % (45.0-75.0)


 


 


Lymphocytes (%) (Auto)


  % (20.0-45.0)


 


 


Monocytes (%) (Auto)  % (1.0-10.0)  


 


Eosinophils (%) (Auto)  % (0.0-3.0)  


 


Basophils (%) (Auto)  % (0.0-2.0)  


 


Differential Total Cells


Counted 100  


 


 


Neutrophils % (Manual) 89 % (45-75)  H


 


Lymphocytes % (Manual) 7 % (20-45)  L


 


Monocytes % (Manual) 4 % (1-10)  


 


Eosinophils % (Manual) 0 % (0-3)  


 


Basophils % (Manual) 0 % (0-2)  


 


Band Neutrophils 0 % (0-8)  


 


Platelet Estimate Adequate  


 


Platelet Morphology Normal  


 


Hypochromasia 2+  


 


Anisocytosis 1+  


 


Erythrocyte Sedimentation Rate


  35 MM/HR


(0-30)  H


 


Sodium Level


  155 MMOL/L


(136-145)  H


 


Potassium Level


  3.1 MMOL/L


(3.5-5.1)  L


 


Chloride Level


  120 MMOL/L


()  H


 


Carbon Dioxide Level


  27 MMOL/L


(21-32)


 


Anion Gap


  8 mmol/L


(5-15)


 


Blood Urea Nitrogen


  33 mg/dL


(7-18)  H


 


Creatinine


  0.8 MG/DL


(0.55-1.30)


 


Estimat Glomerular Filtration


Rate > 60 mL/min


(>60)


 


Glucose Level


  182 MG/DL


()  H


 


Calcium Level


  7.1 MG/DL


(8.5-10.1)  L


 


Phosphorus Level


  2.3 MG/DL


(2.5-4.9)  L


 


Magnesium Level


  1.8 MG/DL


(1.8-2.4)


 


Total Bilirubin


  0.5 MG/DL


(0.2-1.0)


 


Aspartate Amino Transf


(AST/SGOT) 27 U/L (15-37)


 


 


Alanine Aminotransferase


(ALT/SGPT) 77 U/L (12-78)


 


 


Alkaline Phosphatase


  54 U/L


()


 


C-Reactive Protein,


Quantitative 1.9 mg/dL


(0.00-0.90)  H


 


Total Protein


  4.4 G/DL


(6.4-8.2)  L


 


Albumin


  1.1 G/DL


(3.4-5.0)  L


 


Globulin 3.3 g/dL  


 


Albumin/Globulin Ratio


  0.3 (1.0-2.7)


L

















Intake and Output  


 


 9/12/20 9/13/20





 19:00 07:00


 


Intake Total 905 ml 1204 ml


 


Output Total 730 ml 800 ml


 


Balance 175 ml 404 ml


 


  


 


IV Total 255 ml 224 ml


 


Tube Feeding 600 ml 780 ml


 


Other 50 ml 200 ml


 


Output Urine Total 730 ml 800 ml


 


# Bowel Movements 3 








Objective


General Appearance:  WD/WN, no apparent distress, alert


EENT:  PERRL/EOMI, normal ENT inspection


Neck:  non-tender, normal alignment, supple, normal inspection


Cardiovascular:  normal peripheral pulses, normal rate, regular rhythm, no 

gallop/murmur, no JVD


Respiratory/Chest:  Mech vent; decreased breath sounds, crackles/rales, rhonchi 

- bilaterally, expiratory wheezing


Abdomen:  normal bowel sounds, non tender, soft, no organomegaly, no mass


Extremities:  normal range of motion


Neurologic:  CNs II-XII grossly normal


Skin:  normal pigmentation, warm/dry





Assessment/Plan


Problem List:  


(1) HCAP (healthcare-associated pneumonia)


Assessment & Plan:  Strep Group G.  Continue daptomycin per ID=Dr Gomes.  

Pulmonary/Critical care=DR Cherry.  COVID NEG





(2) Sepsis


Assessment & Plan:  Staph haemolyticus.  Continue amikacin and meropenem per ID=

Dr Gomes





(3) Down's syndrome


(4) Dysphagia


Assessment & Plan:  S/P PEG





(5) Seizure disorder


Assessment & Plan:  Continue keppra and depakote





(6) Hypothyroidism


Assessment & Plan:  Continue synthroid





(7) Acute respiratory failure


Assessment & Plan:  Pulmonary = Dr Cherry; [facility] vent














Sanya Schultz MD Sep 13, 2020 15:37

## 2020-09-13 NOTE — NUR
NURSE NOTES:

PATIENT AWOKE, VSS, TOLERATED G TUBE FEEDING, MADE CALM ENVIRONMENT, WILL CONTINUE TO 
MONITOR.

## 2020-09-13 NOTE — NUR
NURSE NOTES:

PATIENT AWOKE, WATCHING TV STATUS, NO PAIN OR SOB NOTED AT THIS TIME, WILL CONTINUE TO 
MONITOR.

## 2020-09-13 NOTE — NUR
NURSE NOTES:

PATIENT AWOKE, OPEN EYES, DID NOT FOLLOW COMMANS, ON ETT TO VENT, AC 26//FIO2 80%/PEEP 
10, O2 SATURATION 97% NOTED, HR 60'S/MIN, SR NOTED, G TUBE INTACT AND PATENT, ONGOING VITAL 
AF 1.2 AT 60ML/HR, RESIDUE 30ML NOTED, KEPT HOB OVER 30 DEGREES, ASPIRATION AND SZ 
PRECAUTION, ABDOMEN SOFT, NON TENDER, F/C INTACT AND PATENT, MARY COLOR URINE OUTED, PICC 
LINE TO LEFT UPPER ARM, INTACT AND PATENT, TKO STATUS, ON P200 BED, MADE LOWER BED POSITION, 
ON BED ALARM AND LOCKED, PLACE CALL LIGHT WITHIN REACH, WILL CONTINUE TO MONITOR.

## 2020-09-14 VITALS — SYSTOLIC BLOOD PRESSURE: 93 MMHG | DIASTOLIC BLOOD PRESSURE: 61 MMHG

## 2020-09-14 VITALS — DIASTOLIC BLOOD PRESSURE: 71 MMHG | SYSTOLIC BLOOD PRESSURE: 134 MMHG

## 2020-09-14 VITALS — SYSTOLIC BLOOD PRESSURE: 131 MMHG | DIASTOLIC BLOOD PRESSURE: 66 MMHG

## 2020-09-14 VITALS — SYSTOLIC BLOOD PRESSURE: 108 MMHG | DIASTOLIC BLOOD PRESSURE: 73 MMHG

## 2020-09-14 VITALS — DIASTOLIC BLOOD PRESSURE: 79 MMHG | SYSTOLIC BLOOD PRESSURE: 139 MMHG

## 2020-09-14 VITALS — DIASTOLIC BLOOD PRESSURE: 70 MMHG | SYSTOLIC BLOOD PRESSURE: 111 MMHG

## 2020-09-14 VITALS — DIASTOLIC BLOOD PRESSURE: 60 MMHG | SYSTOLIC BLOOD PRESSURE: 116 MMHG

## 2020-09-14 VITALS — DIASTOLIC BLOOD PRESSURE: 50 MMHG | SYSTOLIC BLOOD PRESSURE: 109 MMHG

## 2020-09-14 VITALS — SYSTOLIC BLOOD PRESSURE: 110 MMHG | DIASTOLIC BLOOD PRESSURE: 78 MMHG

## 2020-09-14 VITALS — SYSTOLIC BLOOD PRESSURE: 124 MMHG | DIASTOLIC BLOOD PRESSURE: 77 MMHG

## 2020-09-14 VITALS — SYSTOLIC BLOOD PRESSURE: 119 MMHG | DIASTOLIC BLOOD PRESSURE: 66 MMHG

## 2020-09-14 VITALS — SYSTOLIC BLOOD PRESSURE: 119 MMHG | DIASTOLIC BLOOD PRESSURE: 81 MMHG

## 2020-09-14 VITALS — DIASTOLIC BLOOD PRESSURE: 67 MMHG | SYSTOLIC BLOOD PRESSURE: 108 MMHG

## 2020-09-14 VITALS — DIASTOLIC BLOOD PRESSURE: 61 MMHG | SYSTOLIC BLOOD PRESSURE: 116 MMHG

## 2020-09-14 VITALS — DIASTOLIC BLOOD PRESSURE: 72 MMHG | SYSTOLIC BLOOD PRESSURE: 124 MMHG

## 2020-09-14 VITALS — SYSTOLIC BLOOD PRESSURE: 117 MMHG | DIASTOLIC BLOOD PRESSURE: 71 MMHG

## 2020-09-14 VITALS — SYSTOLIC BLOOD PRESSURE: 113 MMHG | DIASTOLIC BLOOD PRESSURE: 45 MMHG

## 2020-09-14 VITALS — SYSTOLIC BLOOD PRESSURE: 117 MMHG | DIASTOLIC BLOOD PRESSURE: 57 MMHG

## 2020-09-14 VITALS — SYSTOLIC BLOOD PRESSURE: 128 MMHG | DIASTOLIC BLOOD PRESSURE: 68 MMHG

## 2020-09-14 VITALS — DIASTOLIC BLOOD PRESSURE: 104 MMHG | SYSTOLIC BLOOD PRESSURE: 121 MMHG

## 2020-09-14 VITALS — DIASTOLIC BLOOD PRESSURE: 71 MMHG | SYSTOLIC BLOOD PRESSURE: 119 MMHG

## 2020-09-14 VITALS — SYSTOLIC BLOOD PRESSURE: 120 MMHG | DIASTOLIC BLOOD PRESSURE: 87 MMHG

## 2020-09-14 VITALS — SYSTOLIC BLOOD PRESSURE: 115 MMHG | DIASTOLIC BLOOD PRESSURE: 64 MMHG

## 2020-09-14 VITALS — DIASTOLIC BLOOD PRESSURE: 69 MMHG | SYSTOLIC BLOOD PRESSURE: 122 MMHG

## 2020-09-14 LAB
ADD MANUAL DIFF: YES
ALBUMIN SERPL-MCNC: 1.1 G/DL (ref 3.4–5)
ALBUMIN/GLOB SERPL: 0.3 {RATIO} (ref 1–2.7)
ALP SERPL-CCNC: 52 U/L (ref 46–116)
ALT SERPL-CCNC: 69 U/L (ref 12–78)
ANION GAP SERPL CALC-SCNC: 9 MMOL/L (ref 5–15)
AST SERPL-CCNC: 26 U/L (ref 15–37)
BILIRUB SERPL-MCNC: 0.4 MG/DL (ref 0.2–1)
BUN SERPL-MCNC: 32 MG/DL (ref 7–18)
CALCIUM SERPL-MCNC: 7 MG/DL (ref 8.5–10.1)
CHLORIDE SERPL-SCNC: 120 MMOL/L (ref 98–107)
CO2 SERPL-SCNC: 28 MMOL/L (ref 21–32)
CREAT SERPL-MCNC: 0.8 MG/DL (ref 0.55–1.3)
ERYTHROCYTE [DISTWIDTH] IN BLOOD BY AUTOMATED COUNT: 15.4 % (ref 11.6–14.8)
GLOBULIN SER-MCNC: 3.3 G/DL
HCT VFR BLD CALC: 24.6 % (ref 37–47)
HGB BLD-MCNC: 8.1 G/DL (ref 12–16)
MCV RBC AUTO: 99 FL (ref 80–99)
PHOSPHATE SERPL-MCNC: 2.7 MG/DL (ref 2.5–4.9)
PLATELET # BLD: 166 K/UL (ref 150–450)
POTASSIUM SERPL-SCNC: 3.1 MMOL/L (ref 3.5–5.1)
RBC # BLD AUTO: 2.49 M/UL (ref 4.2–5.4)
SODIUM SERPL-SCNC: 156 MMOL/L (ref 136–145)
WBC # BLD AUTO: 12.9 K/UL (ref 4.8–10.8)

## 2020-09-14 RX ADMIN — MEROPENEM SCH MLS/HR: 1 INJECTION INTRAVENOUS at 03:35

## 2020-09-14 RX ADMIN — DOPAMINE HYDROCHLORIDE IN DEXTROSE SCH MLS/HR: 1.6 INJECTION, SOLUTION INTRAVENOUS at 11:15

## 2020-09-14 RX ADMIN — AMIKACIN SULFATE SCH MLS/HR: 500 INJECTION, SOLUTION INTRAMUSCULAR; INTRAVENOUS at 15:58

## 2020-09-14 RX ADMIN — VALPROIC ACID SCH MG: 250 SOLUTION ORAL at 15:57

## 2020-09-14 RX ADMIN — MIDODRINE HYDROCHLORIDE SCH MG: 10 TABLET ORAL at 21:40

## 2020-09-14 RX ADMIN — MIDODRINE HYDROCHLORIDE SCH MG: 10 TABLET ORAL at 05:57

## 2020-09-14 RX ADMIN — VALPROIC ACID SCH MG: 250 SOLUTION ORAL at 05:57

## 2020-09-14 RX ADMIN — SODIUM CHLORIDE SCH MLS/HR: 900 INJECTION, SOLUTION INTRAVENOUS at 07:45

## 2020-09-14 RX ADMIN — HYDROCORTISONE SODIUM SUCCINATE SCH MG: 100 INJECTION, POWDER, FOR SOLUTION INTRAMUSCULAR; INTRAVENOUS at 15:58

## 2020-09-14 RX ADMIN — HYDROCORTISONE SODIUM SUCCINATE SCH MG: 100 INJECTION, POWDER, FOR SOLUTION INTRAMUSCULAR; INTRAVENOUS at 05:56

## 2020-09-14 RX ADMIN — HYDROCORTISONE SODIUM SUCCINATE SCH MG: 100 INJECTION, POWDER, FOR SOLUTION INTRAMUSCULAR; INTRAVENOUS at 21:40

## 2020-09-14 RX ADMIN — MEROPENEM SCH MLS/HR: 1 INJECTION INTRAVENOUS at 13:00

## 2020-09-14 RX ADMIN — MIDODRINE HYDROCHLORIDE SCH MG: 10 TABLET ORAL at 15:57

## 2020-09-14 RX ADMIN — PANTOPRAZOLE SODIUM SCH MG: 40 INJECTION, POWDER, FOR SOLUTION INTRAVENOUS at 11:16

## 2020-09-14 RX ADMIN — CHLORHEXIDINE GLUCONATE SCH APPLIC: 213 SOLUTION TOPICAL at 20:27

## 2020-09-14 RX ADMIN — VALPROIC ACID SCH MG: 250 SOLUTION ORAL at 21:41

## 2020-09-14 NOTE — NEPHROLOGY PROGRESS NOTE
Assessment/Plan


Problem List:  


(1) DAVID (acute kidney injury)


(2) Respiratory failure requiring intubation


(3) Down's syndrome


(4) Seizure disorder


(5) Hypothyroidism


Assessment





Acute renal failure, likely due to hypotension


Acute respiratory distress, hypoxia


Seizure disorder


Hypothyroidism


Down syndrome


Full code











Fluid challenge with IV fluids and albumin


Midodrine for BP above 100 systolic


Check TSH level


Check


Correct level


Monitor renal parameters


Urine studies


Per orders


Plan


September 14: Labs reviewed.  Low potassium and high sodium noted.  Hemoglobin 

8.1 stable.  Aim to correct abnormal electrolyte.  Continue rest.  Will give 2 

boluses of D5W 500 cc.


September 13: Lab reviewed.  Abnormal electrolytes noted and addressed.


September 12: Labs reviewed.  Potassium supplement given.  Patient remains full 

code.  Continue per consultants.


September 11: Lab reviewed.  Electrolyte abnormalities addressed.  Continue per 

pulmonary and ID.


September 10: Lab reviewed.  Status unchanged.  Serum sodium 151 unchanged.  

Stable from renal standpoint of view.


September 9: Labs reviewed.  Status quo.  D5W 500 cc IV ordered.  Continue to 

monitor renal parameters.


September 8: Status quo.  Labs reviewed.  Overall condition unchanged.  Patient 

was transfused and hemoglobin higher.  Continue current management.  Patient 

remains full code.


September 7: Status quo.  Overall condition poor.  Very low albumin.  

Edematous.  Hypotensive.  Hemoglobin lower.  Anemia work-up ordered.  I favor 

transfusion 2 units of packed RBCs.  Patient remains full code.  I favor 

supportive care only.  Will discuss.


September 6: Electrolyte abnormalities addressed.  Serum creatinine lower.  

Continue per current management.


September 5: Status unchanged.  Lab reviewed.  Serum potassium 2.7.  IV 

potassium chloride ordered.  Serum creatinine low at 1.6 stable.  Blood 

pressure 90s systolic


September 4: Status quo.  Labs reviewed.  Renal parameters stable.  Serum 

creatinine down to 1.6.  Medication list reviewed.  Continues to be on 

midodrine.  Continue per consultants.


September 3: Status quo.  Labs reviewed.  Electrolytes adjusted.  Serum 

creatinine down to 1.8.  Continue per consultants.


September 2: Status quo.  Labs reviewed.  Phosphorus supplement IV given.  

Serum creatinine 2.  Continue per consultants.


September 1: Requires less pressors.  Albumin bolus given.  1 dose of Lasix IV 

ordered as the patient severely edematous.  Patient serum albumin is very low.  

Continue per consultants.


August 31: Continues to be intubated.  Labs reviewed.  Serum creatinine 1.9 

unchanged.  Blood pressure more stable.  Off 1 of the pressors.  Continue to 

monitor renal parameters.  Continue per consultants.  Patient now on 

hydrocortisone 100 mg every 8 hours.  Will decrease IV fluid.  Normal saline 

down to 50 cc an hour.


August 30: Intubated.  Labs reviewed.  Creatinine 1.9 unchanged.  Continue same 

treatment plan.  Per consultants.  Overall poor prognosis since the patient 

remains on pressors and her pulmonary status is worsening.


August 29: Remains intubated.  Labs reviewed.  Creatinine 1.9.  Blood pressure 

systolic 90s.  Continue per consultants.


August 28: Remains intubated.  Labs reviewed.  Serum creatinine lower to 2.  

Vancomycin level lower.  Remains hypotensive on pressors.  Will increase 

midodrine to 10 mg every 8 hours.  Continue per consultants.  Continue to 

monitor renal parameters.


August 27: Patient now in ICU.  Intubated.  On pressors.  Labs reviewed.  Will 

increase midodrine.  Aim to keep blood pressure over 100 systolic.  Will give 

albumin bolus.  Will check vancomycin level which was elevated when checked 

previously on August 24.  Will monitor renal parameters.  Continue per 

consultants.





Subjective


ROS Limited/Unobtainable:  Yes





Objective


Objective





Last 24 Hour Vital Signs








  Date Time  Temp Pulse Resp B/P (MAP) Pulse Ox O2 Delivery O2 Flow Rate FiO2


 


9/14/20 11:00  55 26 111/70 (84) 100   


 


9/14/20 10:59  55 26     90


 


9/14/20 10:00  58 26 121/104 (110) 100   


 


9/14/20 09:00  67 26 128/68 (88) 100   


 


9/14/20 08:56        100


 


9/14/20 08:00 98.9 59 24 124/72 (89) 99   


 


9/14/20 08:00      Mechanical Ventilator  





      Mechanical Ventilator  


 


9/14/20 08:00        100


 


9/14/20 07:15  45 26     80


 


9/14/20 07:00  47 26 115/64 (81) 100   


 


9/14/20 06:00  51 26 134/71 (92) 100   


 


9/14/20 05:00  51 26 116/61 (79) 100   


 


9/14/20 04:00  85      


 


9/14/20 04:00 99.1 85 25 117/71 (86) 98   


 


9/14/20 04:00      Mechanical Ventilator  





      Mechanical Ventilator  


 


9/14/20 04:00        80


 


9/14/20 03:00  67 34 139/79 (99) 100   


 


9/14/20 02:50  67 26     80


 


9/14/20 02:00  69 29 119/81 (94) 100   


 


9/14/20 01:00  57 16 119/66 (83) 100   


 


9/14/20 00:00        80


 


9/14/20 00:00  62      


 


9/14/20 00:00      Mechanical Ventilator  





      Mechanical Ventilator  


 


9/14/20 00:00 98.8 62 15 119/71 (87) 100   


 


9/13/20 23:11  63 26     80


 


9/13/20 23:00  63 17 133/82 (99) 100   


 


9/13/20 22:00  66 21 115/29 (57) 100   


 


9/13/20 21:00  60 20 101/56 (71) 100   


 


9/13/20 20:00      Mechanical Ventilator  





      Mechanical Ventilator  


 


9/13/20 20:00 98.5 64 20 111/89 (96) 100   


 


9/13/20 20:00        80


 


9/13/20 19:34  61      


 


9/13/20 19:29  76 26     80


 


9/13/20 19:00  51 26 120/72 (88) 94   


 


9/13/20 19:00  60 22 116/67 (83) 100   


 


9/13/20 18:00  51 26 120/72 (88) 94   


 


9/13/20 17:00  66 25 109/54 (72) 97   


 


9/13/20 16:00 98.1 67 21 96/72 (80) 97   


 


9/13/20 16:00        80


 


9/13/20 16:00      Mechanical Ventilator  





      Mechanical Ventilator  


 


9/13/20 15:54  60      


 


9/13/20 15:33  75 26     80


 


9/13/20 15:00  58 22 133/73 (93) 95   


 


9/13/20 14:00  47 26 113/59 (77) 94   


 


9/13/20 13:00 98.2 49 26 114/59 (77) 92   


 


9/13/20 12:26  55      

















Intake and Output  


 


 9/13/20 9/14/20





 19:00 07:00


 


Intake Total 942.776 ml 930 ml


 


Output Total 685 ml 680 ml


 


Balance 257.776 ml 250 ml


 


  


 


IV Total 242.776 ml 110 ml


 


Tube Feeding 600 ml 720 ml


 


Other 100 ml 100 ml


 


Output Urine Total 685 ml 680 ml


 


Stool Total  0 ml


 


# Bowel Movements 2 2








Laboratory Tests


9/14/20 05:10: 


White Blood Count 12.9H, Red Blood Count 2.49L, Hemoglobin 8.1L, Hematocrit 

24.6L, Mean Corpuscular Volume 99, Mean Corpuscular Hemoglobin 32.4H, Mean 

Corpuscular Hemoglobin Concent 32.8, Red Cell Distribution Width 15.4H, 

Platelet Count 166, Mean Platelet Volume 8.9, Neutrophils (%) (Auto) , 

Lymphocytes (%) (Auto) , Monocytes (%) (Auto) , Eosinophils (%) (Auto) , 

Basophils (%) (Auto) , Differential Total Cells Counted 100, Neutrophils % (

Manual) 97H, Lymphocytes % (Manual) 2L, Monocytes % (Manual) 1, Eosinophils % (

Manual) 0, Basophils % (Manual) 0, Band Neutrophils 0, Platelet Estimate 

Adequate, Platelet Morphology Normal, Hypochromasia 2+, Anisocytosis 1+, Sodium 

Level 156H, Potassium Level 3.1L, Chloride Level 120H, Carbon Dioxide Level 28, 

Anion Gap 9, Blood Urea Nitrogen 32H, Creatinine 0.8, Estimat Glomerular 

Filtration Rate > 60, Glucose Level 179H, Calcium Level 7.0L, Phosphorus Level 

2.7, Magnesium Level 1.8, Total Bilirubin 0.4, Aspartate Amino Transf (AST/SGOT

) 26, Alanine Aminotransferase (ALT/SGPT) 69, Alkaline Phosphatase 52, Total 

Protein 4.4L, Albumin 1.1L, Globulin 3.3, Albumin/Globulin Ratio 0.3L


9/14/20 08:04: 


Arterial Blood pH 7.425, Arterial Blood Partial Pressure CO2 41.3, Arterial 

Blood Partial Pressure O2 52.7L, Arterial Blood HCO3 26.5H, Arterial Blood 

Oxygen Saturation 84.7*L, Arterial Blood Base Excess 1.9, Cole Test Positive


Height (Feet):  5


Height (Inches):  3.00


Weight (Pounds):  172


General Appearance:  no apparent distress


EENT:  other - On mechanical ventilation


Cardiovascular:  normal rate - Heart rate in 50s


Respiratory/Chest:  decreased breath sounds


Abdomen:  soft, distended











Lam Nino MD Sep 14, 2020 12:22

## 2020-09-14 NOTE — DIAGNOSTIC IMAGING REPORT
Procedure: XRAY Chest 1v

Reason for study: Reason For Exam: [id number]

 

Comparison films:  9/10/2020.

 

FINDINGS:

 

Endotracheal tube remain in place. There is slightly improved aeration of both lungs.

There is still residual alveolar densities bilaterally. Cardiac and mediastinal

silhouette are within normal limits. Small left effusion noted. The bony thorax

appear unremarkable.

 

IMPRESSION:  

 

Improved aeration of both lungs.

## 2020-09-14 NOTE — NUR
NURSE NOTES:

PATIENT AWOKE, OPEN EYES, DID NOT FOLLOW COMMAND, ON ETT TO VENT, AC 26//FIO2 90%/PEEP 
10, O2 SATURATION OVER 96% NOTED, HR 60'S/MIN, SR NOTED, G TUBE INTACT AND PATENT, ONGOING 
VITAL AF 1.2 AT 60ML/HR, RESIDUE 30ML NOTED, KEPT HOB OVER 30 DEGREES, ASPIRATION AND SZ 
PRECAUTION, ABDOMEN SOFT, NON TENDER, RECTAL TUBE INTACT AND PATENT, BROWN COLOR STOOL 
OUTED, F/C INTACT AND PATENT, MARY COLOR URINE OUTED, PICC LINE TO LEFT UPPER ARM, INTACT 
AND PATENT, RUNNING POTASSIUM 20MEQ/100ML  AT 50ML/HR STATUS, ON P200 BED, MADE LOWER BED 
POSITION, ON BED ALARM AND LOCKED, PLACE CALL LIGHT WITHIN REACH, WILL CONTINUE TO MONITOR.

## 2020-09-14 NOTE — PULMONOLGY CRITICAL CARE NOTE
Critical Care - Asmt/Plan


Problems:  


(1) Acute respiratory failure


(2) Bacteremia


(3) Pneumonia


(4) Sepsis


(5) HCAP (healthcare-associated pneumonia)


(6) Seizure disorder


(7) Down's syndrome


Respiratory:  monitor respiratory rate, adjust FIO2, CXR


Cardiac:  continue to monitor HR/BP


Renal:  F/U I&O, keep IV fluid, check electrolytes


Infectious Disease:  check cultures, continue antibiotics, add antibiotics


Gastrointestinal:  continue feedings/current rate


Endocrine:  monitor blood sugar


Hematologic:  transfuse if hgb<8.5


Neurologic:  PRN Ativan, PRN Morphine, keep patient comfortable


Prophylaxis:  Protonix, Heparin


Disposition:  transfer to


Notes Reviewed:  internist, cardio, renal


Discussed with:  nurses, consultants, 





Critical Care - Objective





Last 24 Hour Vital Signs








  Date Time  Temp Pulse Resp B/P (MAP) Pulse Ox O2 Delivery O2 Flow Rate FiO2


 


9/14/20 08:56        100


 


9/14/20 07:15  45 26     80


 


9/14/20 07:00  47 26 115/64 (81) 100   


 


9/14/20 06:00  51 26 134/71 (92) 100   


 


9/14/20 05:00  51 26 116/61 (79) 100   


 


9/14/20 04:00  85      


 


9/14/20 04:00 99.1 85 25 117/71 (86) 98   


 


9/14/20 04:00      Mechanical Ventilator  





      Mechanical Ventilator  


 


9/14/20 04:00        80


 


9/14/20 03:00  67 34 139/79 (99) 100   


 


9/14/20 02:50  67 26     80


 


9/14/20 02:00  69 29 119/81 (94) 100   


 


9/14/20 01:00  57 16 119/66 (83) 100   


 


9/14/20 00:00        80


 


9/14/20 00:00  62      


 


9/14/20 00:00      Mechanical Ventilator  





      Mechanical Ventilator  


 


9/14/20 00:00 98.8 62 15 119/71 (87) 100   


 


9/13/20 23:11  63 26     80


 


9/13/20 23:00  63 17 133/82 (99) 100   


 


9/13/20 22:00  66 21 115/29 (57) 100   


 


9/13/20 21:00  60 20 101/56 (71) 100   


 


9/13/20 20:00      Mechanical Ventilator  





      Mechanical Ventilator  


 


9/13/20 20:00 98.5 64 20 111/89 (96) 100   


 


9/13/20 20:00        80


 


9/13/20 19:34  61      


 


9/13/20 19:29  76 26     80


 


9/13/20 19:00  51 26 120/72 (88) 94   


 


9/13/20 19:00  60 22 116/67 (83) 100   


 


9/13/20 18:00  51 26 120/72 (88) 94   


 


9/13/20 17:00  66 25 109/54 (72) 97   


 


9/13/20 16:00 98.1 67 21 96/72 (80) 97   


 


9/13/20 16:00        80


 


9/13/20 16:00      Mechanical Ventilator  





      Mechanical Ventilator  


 


9/13/20 15:54  60      


 


9/13/20 15:33  75 26     80


 


9/13/20 15:00  58 22 133/73 (93) 95   


 


9/13/20 14:00  47 26 113/59 (77) 94   


 


9/13/20 13:00 98.2 49 26 114/59 (77) 92   


 


9/13/20 12:26  55      


 


9/13/20 12:00        80


 


9/13/20 12:00      Mechanical Ventilator  





      Mechanical Ventilator  


 


9/13/20 12:00  58 21 145/71 (95) 95   


 


9/13/20 11:39  59 26     80


 


9/13/20 11:00  56 23 138/68 (91) 94   








Status:  awake


Condition:  critical


HEENT:  atraumatic, normocephalic


Lungs:  chest wall tender


Heart:  HR/BP unstable


Abdomen:  soft, active bowel sounds


Extremities:  no C/C/E


Accucheck:  131





Critical Care - Subjective


ROS Limited/Unobtainable:  Yes


Condition:  critical


EKG Rhythm:  Sinus Rhythm


FI02:  100


Vent Support Breath Rate:  26


Vent Support Mode:  AC


Vent Tidal Volume:  500


Sputum Amount:  Moderate


PEEP:  10.0


PIP:  66


Tube Feeding Amount:  60


I&O:











Intake and Output  


 


 9/13/20 9/14/20





 19:00 07:00


 


Intake Total 942.776 ml 930 ml


 


Output Total 685 ml 680 ml


 


Balance 257.776 ml 250 ml


 


  


 


IV Total 242.776 ml 110 ml


 


Tube Feeding 600 ml 720 ml


 


Other 100 ml 100 ml


 


Output Urine Total 685 ml 680 ml


 


Stool Total  0 ml


 


# Bowel Movements 2 2








ET-Tube:  7.5


ET Position:  22


Labs:





Laboratory Tests








Test


  9/14/20


05:10 9/14/20


08:04


 


White Blood Count


  12.9 K/UL


(4.8-10.8)  H 


 


 


Red Blood Count


  2.49 M/UL


(4.20-5.40)  L 


 


 


Hemoglobin


  8.1 G/DL


(12.0-16.0)  L 


 


 


Hematocrit


  24.6 %


(37.0-47.0)  L 


 


 


Mean Corpuscular Volume 99 FL (80-99)   


 


Mean Corpuscular Hemoglobin


  32.4 PG


(27.0-31.0)  H 


 


 


Mean Corpuscular Hemoglobin


Concent 32.8 G/DL


(32.0-36.0) 


 


 


Red Cell Distribution Width


  15.4 %


(11.6-14.8)  H 


 


 


Platelet Count


  166 K/UL


(150-450) 


 


 


Mean Platelet Volume


  8.9 FL


(6.5-10.1) 


 


 


Neutrophils (%) (Auto)


  % (45.0-75.0)


  


 


 


Lymphocytes (%) (Auto)


  % (20.0-45.0)


  


 


 


Monocytes (%) (Auto)  % (1.0-10.0)   


 


Eosinophils (%) (Auto)  % (0.0-3.0)   


 


Basophils (%) (Auto)  % (0.0-2.0)   


 


Differential Total Cells


Counted 100  


  


 


 


Neutrophils % (Manual) 97 % (45-75)  H 


 


Lymphocytes % (Manual) 2 % (20-45)  L 


 


Monocytes % (Manual) 1 % (1-10)   


 


Eosinophils % (Manual) 0 % (0-3)   


 


Basophils % (Manual) 0 % (0-2)   


 


Band Neutrophils 0 % (0-8)   


 


Platelet Estimate Adequate   


 


Platelet Morphology Normal   


 


Hypochromasia 2+   


 


Anisocytosis 1+   


 


Sodium Level


  156 MMOL/L


(136-145)  H 


 


 


Potassium Level


  3.1 MMOL/L


(3.5-5.1)  L 


 


 


Chloride Level


  120 MMOL/L


()  H 


 


 


Carbon Dioxide Level


  28 MMOL/L


(21-32) 


 


 


Anion Gap


  9 mmol/L


(5-15) 


 


 


Blood Urea Nitrogen


  32 mg/dL


(7-18)  H 


 


 


Creatinine


  0.8 MG/DL


(0.55-1.30) 


 


 


Estimat Glomerular Filtration


Rate > 60 mL/min


(>60) 


 


 


Glucose Level


  179 MG/DL


()  H 


 


 


Calcium Level


  7.0 MG/DL


(8.5-10.1)  L 


 


 


Phosphorus Level


  2.7 MG/DL


(2.5-4.9) 


 


 


Magnesium Level


  1.8 MG/DL


(1.8-2.4) 


 


 


Total Bilirubin


  0.4 MG/DL


(0.2-1.0) 


 


 


Aspartate Amino Transf


(AST/SGOT) 26 U/L (15-37)


  


 


 


Alanine Aminotransferase


(ALT/SGPT) 69 U/L (12-78)


  


 


 


Alkaline Phosphatase


  52 U/L


() 


 


 


Total Protein


  4.4 G/DL


(6.4-8.2)  L 


 


 


Albumin


  1.1 G/DL


(3.4-5.0)  L 


 


 


Globulin 3.3 g/dL   


 


Albumin/Globulin Ratio


  0.3 (1.0-2.7)


L 


 


 


Arterial Blood pH


  


  7.425


(7.350-7.450)


 


Arterial Blood Partial


Pressure CO2 


  41.3 mmHg


(35.0-45.0)


 


Arterial Blood Partial


Pressure O2 


  52.7 mmHg


(75.0-100.0)  L


 


Arterial Blood HCO3


  


  26.5 mmol/L


(22.0-26.0)  H


 


Arterial Blood Oxygen


Saturation 


  84.7 %


()  *L


 


Arterial Blood Base Excess  1.9 (-2-2)  


 


Cole Test  Positive  

















Chano Cherry MD Sep 14, 2020 10:57

## 2020-09-14 NOTE — NUR
NURSE HAND-OFF REPORT: 



Latest Vital Signs: Temperature 99.1 , Pulse 45 , B/P 134 /71 , Respiratory Rate 26 , O2 SAT 
100 , Mechanical Ventilator, O2 Flow Rate 15.0 .  

Vital Sign Comment: 



EKG Rhythm: Sinus Bradycardia

Rhythm change?: N 

MD Notified?: -

MD Response: 



Latest Momin Fall Score: 70  

Fall Risk: High Risk 

Safety Measures: Call light Within Reach, Bed Alarm Zone 1, Side Rails Side Rails x3, Bed 
position Low and Locked.

Fall Precautions: 

Yellow Socks

Door Sign

Patient Fall Education



Report given to ALISON NGUYEN RN.

## 2020-09-14 NOTE — CARDIOLOGY PROGRESS NOTE
Assessment/Plan


Assessment/Plan


sepsis


respiratory failure 


ards 


renal insuf 


bacteremia


abn cardiac enzyme due to demand 


sinus arnold stable 


thrombocytopenia resolved  


hypernatremia 











vent support 


abx 


wbc min increased 


na increased still 


3rd spacing alot 


cxr form 9/10 reviewed and compared to cxr from today appears improved to my 

reading and with radiologsit reading as well 


tsh seems fine 


sig edema with hypernatremia need nauteresis eventually 


remains off pressor still at this time 


hr inthe 40's-60's no sig pauses on tele slower heart rate occure during sleep 

per rn 


tele personally reviewed 


hopefully will be able to wean





Subjective


Subjective


on  a vent not communicative but look awake





Objective





Last 24 Hour Vital Signs








  Date Time  Temp Pulse Resp B/P (MAP) Pulse Ox O2 Delivery O2 Flow Rate FiO2


 


9/14/20 16:00      Mechanical Ventilator  





      Mechanical Ventilator  


 


9/14/20 16:00        100


 


9/14/20 16:00  59      


 


9/14/20 15:03  50 26     80


 


9/14/20 15:00  56 26 93/61 (72) 100   


 


9/14/20 14:00  63 24 108/67 (81) 100   


 


9/14/20 13:00  55 26 110/78 (89) 100   


 


9/14/20 12:00      Mechanical Ventilator  





      Mechanical Ventilator  


 


9/14/20 12:00 98.4 59 26 120/87 (98) 100   


 


9/14/20 12:00  60      


 


9/14/20 12:00        100


 


9/14/20 11:00  55 26 111/70 (84) 100   


 


9/14/20 10:59  55 26     90


 


9/14/20 10:00  58 26 121/104 (110) 100   


 


9/14/20 09:00  67 26 128/68 (88) 100   


 


9/14/20 08:56        100


 


9/14/20 08:00 98.9 59 24 124/72 (89) 99   


 


9/14/20 08:00  65      


 


9/14/20 08:00      Mechanical Ventilator  





      Mechanical Ventilator  


 


9/14/20 08:00        100


 


9/14/20 07:15  45 26     80


 


9/14/20 07:00  47 26 115/64 (81) 100   


 


9/14/20 06:00  51 26 134/71 (92) 100   


 


9/14/20 05:00  51 26 116/61 (79) 100   


 


9/14/20 04:00  85      


 


9/14/20 04:00 99.1 85 25 117/71 (86) 98   


 


9/14/20 04:00      Mechanical Ventilator  





      Mechanical Ventilator  


 


9/14/20 04:00        80


 


9/14/20 03:00  67 34 139/79 (99) 100   


 


9/14/20 02:50  67 26     80


 


9/14/20 02:00  69 29 119/81 (94) 100   


 


9/14/20 01:00  57 16 119/66 (83) 100   


 


9/14/20 00:00        80


 


9/14/20 00:00  62      


 


9/14/20 00:00      Mechanical Ventilator  





      Mechanical Ventilator  


 


9/14/20 00:00 98.8 62 15 119/71 (87) 100   


 


9/13/20 23:11  63 26     80


 


9/13/20 23:00  63 17 133/82 (99) 100   


 


9/13/20 22:00  66 21 115/29 (57) 100   


 


9/13/20 21:00  60 20 101/56 (71) 100   


 


9/13/20 20:00      Mechanical Ventilator  





      Mechanical Ventilator  


 


9/13/20 20:00 98.5 64 20 111/89 (96) 100   


 


9/13/20 20:00        80


 


9/13/20 19:34  61      


 


9/13/20 19:29  76 26     80


 


9/13/20 19:00  51 26 120/72 (88) 94   


 


9/13/20 19:00  60 22 116/67 (83) 100   








General Appearance:  on vent, patient on isolation, other - awake


Cardiovascular:  normal rate


Respiratory/Chest:  lungs clear


Abdomen:  normal bowel sounds, non tender, soft


Extremities:  moderate edema











Intake and Output  


 


 9/13/20 9/14/20





 19:00 07:00


 


Intake Total 942.776 ml 930 ml


 


Output Total 685 ml 680 ml


 


Balance 257.776 ml 250 ml


 


  


 


IV Total 242.776 ml 110 ml


 


Tube Feeding 600 ml 720 ml


 


Other 100 ml 100 ml


 


Output Urine Total 685 ml 680 ml


 


Stool Total  0 ml


 


# Bowel Movements 2 2











Laboratory Tests








Test


  9/14/20


05:10 9/14/20


08:04


 


White Blood Count


  12.9 K/UL


(4.8-10.8)  H 


 


 


Red Blood Count


  2.49 M/UL


(4.20-5.40)  L 


 


 


Hemoglobin


  8.1 G/DL


(12.0-16.0)  L 


 


 


Hematocrit


  24.6 %


(37.0-47.0)  L 


 


 


Mean Corpuscular Volume 99 FL (80-99)   


 


Mean Corpuscular Hemoglobin


  32.4 PG


(27.0-31.0)  H 


 


 


Mean Corpuscular Hemoglobin


Concent 32.8 G/DL


(32.0-36.0) 


 


 


Red Cell Distribution Width


  15.4 %


(11.6-14.8)  H 


 


 


Platelet Count


  166 K/UL


(150-450) 


 


 


Mean Platelet Volume


  8.9 FL


(6.5-10.1) 


 


 


Neutrophils (%) (Auto)


  % (45.0-75.0)


  


 


 


Lymphocytes (%) (Auto)


  % (20.0-45.0)


  


 


 


Monocytes (%) (Auto)  % (1.0-10.0)   


 


Eosinophils (%) (Auto)  % (0.0-3.0)   


 


Basophils (%) (Auto)  % (0.0-2.0)   


 


Differential Total Cells


Counted 100  


  


 


 


Neutrophils % (Manual) 97 % (45-75)  H 


 


Lymphocytes % (Manual) 2 % (20-45)  L 


 


Monocytes % (Manual) 1 % (1-10)   


 


Eosinophils % (Manual) 0 % (0-3)   


 


Basophils % (Manual) 0 % (0-2)   


 


Band Neutrophils 0 % (0-8)   


 


Platelet Estimate Adequate   


 


Platelet Morphology Normal   


 


Hypochromasia 2+   


 


Anisocytosis 1+   


 


Sodium Level


  156 MMOL/L


(136-145)  H 


 


 


Potassium Level


  3.1 MMOL/L


(3.5-5.1)  L 


 


 


Chloride Level


  120 MMOL/L


()  H 


 


 


Carbon Dioxide Level


  28 MMOL/L


(21-32) 


 


 


Anion Gap


  9 mmol/L


(5-15) 


 


 


Blood Urea Nitrogen


  32 mg/dL


(7-18)  H 


 


 


Creatinine


  0.8 MG/DL


(0.55-1.30) 


 


 


Estimat Glomerular Filtration


Rate > 60 mL/min


(>60) 


 


 


Glucose Level


  179 MG/DL


()  H 


 


 


Calcium Level


  7.0 MG/DL


(8.5-10.1)  L 


 


 


Phosphorus Level


  2.7 MG/DL


(2.5-4.9) 


 


 


Magnesium Level


  1.8 MG/DL


(1.8-2.4) 


 


 


Total Bilirubin


  0.4 MG/DL


(0.2-1.0) 


 


 


Aspartate Amino Transf


(AST/SGOT) 26 U/L (15-37)


  


 


 


Alanine Aminotransferase


(ALT/SGPT) 69 U/L (12-78)


  


 


 


Alkaline Phosphatase


  52 U/L


() 


 


 


Total Protein


  4.4 G/DL


(6.4-8.2)  L 


 


 


Albumin


  1.1 G/DL


(3.4-5.0)  L 


 


 


Globulin 3.3 g/dL   


 


Albumin/Globulin Ratio


  0.3 (1.0-2.7)


L 


 


 


Arterial Blood pH


  


  7.425


(7.350-7.450)


 


Arterial Blood Partial


Pressure CO2 


  41.3 mmHg


(35.0-45.0)


 


Arterial Blood Partial


Pressure O2 


  52.7 mmHg


(75.0-100.0)  L


 


Arterial Blood HCO3


  


  26.5 mmol/L


(22.0-26.0)  H


 


Arterial Blood Oxygen


Saturation 


  84.7 %


()  *L


 


Arterial Blood Base Excess  1.9 (-2-2)  


 


Cole Test  Positive  

















Constantine Jorgensen MD Sep 14, 2020 18:58

## 2020-09-14 NOTE — NUR
NURSE NOTES:

Patient stable, opening eyes and making eye contact. Patient stable with no s/sx of pain or 
distress.

## 2020-09-14 NOTE — INTERNAL MED PROGRESS NOTE
Subjective


Date of Service:  Sep 14, 2020


Physician Name


Sanya Schultz


Attending Physician


Chano Cherry MD





Current Medications








 Medications


  (Trade)  Dose


 Ordered  Sig/Didi


 Route


 PRN Reason  Start Time


 Stop Time Status Last Admin


Dose Admin


 


 Acetaminophen


  (Tylenol)  650 mg  Q4H  PRN


 GT


 FEVER  8/26/20 11:45


 9/25/20 11:44  9/7/20 03:17


 


 


 Amikacin Protocol


  (Amikacin


 pharmacy to dose)  1 ea  DAILY  PRN


 MISC


 Per rx protocol  9/8/20 12:00


 10/8/20 11:59   


 


 


 Amikacin Sulfate


 1000 mg/Sodium


 Chloride  114 ml @ 


 114 mls/hr  Q36H


 IV


   9/8/20 14:00


 9/15/20 13:59  9/14/20 15:58


 


 


 Chlorhexidine


 Gluconate


  (Beatrice-Hex 2%)  1 applic  DAILY@2000


 TOPIC


   8/31/20 20:00


 11/29/20 19:59  9/13/20 19:47


 


 


 Clotrimazole


  (Lotrimin)  1 applic  Q12HR


 TOPIC


   8/30/20 13:00


 11/28/20 12:59  9/14/20 11:16


 


 


 Dextrose


  (Dextrose 50%)  25 ml  Q30M  PRN


 IV


 Hypoglycemia  8/26/20 11:30


 11/20/20 11:29   


 


 


 Dextrose


  (Dextrose 50%)  50 ml  Q30M  PRN


 IV


 Hypoglycemia  8/26/20 11:30


 11/20/20 11:29   


 


 


 Dopamine HCl/


 Dextrose  250 ml @ 0


 mls/hr  Q24H


 IV


   9/4/20 11:15


 12/3/20 11:14  9/6/20 19:47


 


 


 Hydrocortisone


  (Solu-CORTEF)  100 mg  EVERY 8  HOURS


 IV


   8/30/20 14:00


 11/28/20 13:59  9/14/20 15:58


 


 


 Levothyroxine


 Sodium


  (Synthroid)  75 mcg  DAILY


 GT


   8/27/20 09:00


 9/22/20 08:59  9/14/20 11:16


 


 


 Midodrine


  (Pro-Amatine)  10 mg  Q8HR


 GT


   8/28/20 14:00


 11/26/20 13:59  9/14/20 15:57


 


 


 Norepinephrine


 Bitartrate 16 mg/


 Dextrose  500 ml @ 0


 mls/hr  Q24H


 IV


   8/31/20 07:45


 9/29/20 12:59  9/4/20 08:43


 


 


 Ondansetron HCl


  (Zofran)  4 mg  Q6H  PRN


 IVP


 Nausea & Vomiting  8/26/20 12:00


 9/21/20 11:59   


 


 


 Pantoprazole


  (Protonix)  40 mg  DAILY


 IV


   8/27/20 09:00


 9/22/20 08:59  9/14/20 11:16


 


 


 Phenylephrine HCl


 50 mg/Dextrose  250 ml @ 0


 mls/hr  Q24H  PRN


 IV


 For hypotension  8/29/20 17:45


 9/28/20 17:44  8/31/20 01:49


 


 


 Polyethylene


 Glycol


  (Miralax)  17 gm  DAILYPRN  PRN


 GT


 Constipation  8/26/20 12:00


 9/21/20 11:59   


 


 


 Potassium Chloride  100 ml @ 


 50 mls/hr  Q2H


 IVPB


   9/14/20 18:30


 9/14/20 22:29   


 


 


 Valproic Acid


  (Depakene)  500 mg  EVERY 8  HOURS


 GT


   8/26/20 14:00


 9/21/20 21:59  9/14/20 15:57


 








Allergies:  


Coded Allergies:  


     No Known Allergies (Unverified , 1/8/19)


ROS Limited/Unobtainable:  Yes


Subjective


58 YO F with Down's syndrome admitted with hypoxia.  Now sepsis and pneumonia.  

Cover for Int Med-DR Hung.  ICU.  Intubated and sedated





Objective





Last Vital Signs








  Date Time  Temp Pulse Resp B/P (MAP) Pulse Ox O2 Delivery O2 Flow Rate FiO2


 


9/14/20 16:00      Mechanical Ventilator  





      Mechanical Ventilator  


 


9/14/20 16:00        100


 


9/14/20 16:00  59      


 


9/14/20 15:03   26     


 


9/14/20 15:00    93/61 (72) 100   


 


9/14/20 12:00 98.4       











Laboratory Tests








Test


  9/14/20


05:10 9/14/20


08:04


 


White Blood Count


  12.9 K/UL


(4.8-10.8)  H 


 


 


Red Blood Count


  2.49 M/UL


(4.20-5.40)  L 


 


 


Hemoglobin


  8.1 G/DL


(12.0-16.0)  L 


 


 


Hematocrit


  24.6 %


(37.0-47.0)  L 


 


 


Mean Corpuscular Volume 99 FL (80-99)   


 


Mean Corpuscular Hemoglobin


  32.4 PG


(27.0-31.0)  H 


 


 


Mean Corpuscular Hemoglobin


Concent 32.8 G/DL


(32.0-36.0) 


 


 


Red Cell Distribution Width


  15.4 %


(11.6-14.8)  H 


 


 


Platelet Count


  166 K/UL


(150-450) 


 


 


Mean Platelet Volume


  8.9 FL


(6.5-10.1) 


 


 


Neutrophils (%) (Auto)


  % (45.0-75.0)


  


 


 


Lymphocytes (%) (Auto)


  % (20.0-45.0)


  


 


 


Monocytes (%) (Auto)  % (1.0-10.0)   


 


Eosinophils (%) (Auto)  % (0.0-3.0)   


 


Basophils (%) (Auto)  % (0.0-2.0)   


 


Differential Total Cells


Counted 100  


  


 


 


Neutrophils % (Manual) 97 % (45-75)  H 


 


Lymphocytes % (Manual) 2 % (20-45)  L 


 


Monocytes % (Manual) 1 % (1-10)   


 


Eosinophils % (Manual) 0 % (0-3)   


 


Basophils % (Manual) 0 % (0-2)   


 


Band Neutrophils 0 % (0-8)   


 


Platelet Estimate Adequate   


 


Platelet Morphology Normal   


 


Hypochromasia 2+   


 


Anisocytosis 1+   


 


Sodium Level


  156 MMOL/L


(136-145)  H 


 


 


Potassium Level


  3.1 MMOL/L


(3.5-5.1)  L 


 


 


Chloride Level


  120 MMOL/L


()  H 


 


 


Carbon Dioxide Level


  28 MMOL/L


(21-32) 


 


 


Anion Gap


  9 mmol/L


(5-15) 


 


 


Blood Urea Nitrogen


  32 mg/dL


(7-18)  H 


 


 


Creatinine


  0.8 MG/DL


(0.55-1.30) 


 


 


Estimat Glomerular Filtration


Rate > 60 mL/min


(>60) 


 


 


Glucose Level


  179 MG/DL


()  H 


 


 


Calcium Level


  7.0 MG/DL


(8.5-10.1)  L 


 


 


Phosphorus Level


  2.7 MG/DL


(2.5-4.9) 


 


 


Magnesium Level


  1.8 MG/DL


(1.8-2.4) 


 


 


Total Bilirubin


  0.4 MG/DL


(0.2-1.0) 


 


 


Aspartate Amino Transf


(AST/SGOT) 26 U/L (15-37)


  


 


 


Alanine Aminotransferase


(ALT/SGPT) 69 U/L (12-78)


  


 


 


Alkaline Phosphatase


  52 U/L


() 


 


 


Total Protein


  4.4 G/DL


(6.4-8.2)  L 


 


 


Albumin


  1.1 G/DL


(3.4-5.0)  L 


 


 


Globulin 3.3 g/dL   


 


Albumin/Globulin Ratio


  0.3 (1.0-2.7)


L 


 


 


Arterial Blood pH


  


  7.425


(7.350-7.450)


 


Arterial Blood Partial


Pressure CO2 


  41.3 mmHg


(35.0-45.0)


 


Arterial Blood Partial


Pressure O2 


  52.7 mmHg


(75.0-100.0)  L


 


Arterial Blood HCO3


  


  26.5 mmol/L


(22.0-26.0)  H


 


Arterial Blood Oxygen


Saturation 


  84.7 %


()  *L


 


Arterial Blood Base Excess  1.9 (-2-2)  


 


Cole Test  Positive  

















Intake and Output  


 


 9/13/20 9/14/20





 19:00 07:00


 


Intake Total 942.776 ml 930 ml


 


Output Total 685 ml 680 ml


 


Balance 257.776 ml 250 ml


 


  


 


IV Total 242.776 ml 110 ml


 


Tube Feeding 600 ml 720 ml


 


Other 100 ml 100 ml


 


Output Urine Total 685 ml 680 ml


 


Stool Total  0 ml


 


# Bowel Movements 2 2








Objective


General Appearance:  WD/WN, no apparent distress, alert


EENT:  PERRL/EOMI, normal ENT inspection


Neck:  non-tender, normal alignment, supple, normal inspection


Cardiovascular:  normal peripheral pulses, normal rate, regular rhythm, no 

gallop/murmur, no JVD


Respiratory/Chest:  [facility] vent; decreased breath sounds, crackles/rales, rhonchi 

- bilaterally, expiratory wheezing


Abdomen:  normal bowel sounds, non tender, soft, no organomegaly, no mass


Extremities:  normal range of motion


Neurologic:  CNs II-XII grossly normal


Skin:  normal pigmentation, warm/dry





Assessment/Plan


Problem List:  


(1) HCAP (healthcare-associated pneumonia)


Assessment & Plan:  Strep Group G.  Continue daptomycin per ID=Dr Gomes.  

Pulmonary/Critical care=DR Cherry.  COVID NEG





(2) Sepsis


Assessment & Plan:  Staph haemolyticus.  Continue amikacin and meropenem per ID=

Dr Gomes





(3) Down's syndrome


(4) Dysphagia


Assessment & Plan:  S/P PEG





(5) Seizure disorder


Assessment & Plan:  Continue keppra and depakote





(6) Hypothyroidism


Assessment & Plan:  Continue synthroid





(7) Acute respiratory failure


Assessment & Plan:  Pulmonary = Dr Cherry; [facility] vent














Sanya Schultz MD Sep 14, 2020 18:44

## 2020-09-14 NOTE — NUR
NURSE NOTES:

CHANGED FIO2 70% BY RT, O2 SATURATION 100% NOTED AT THIS TIME, WILL CONTINUE TO MONITOR.

## 2020-09-14 NOTE — NUR
NURSE NOTES:

LE; MORNING CARE WAS DONE, INSERTED RECTAL TUBE DUE TO DIARRHEA, WILL CONTINUE TO MONITOR.

## 2020-09-14 NOTE — NUR
NURSE NOTES:

Patient stable, opening eyes and making eye contact. Patient stable with no s/sx of pain or 
distress. Patient cleaned, rectal tube replaced and stool sample collected.

## 2020-09-14 NOTE — NUR
NURSE NOTES:

Patient stable, opening eyes and making eye contact. Patient stable with no s/sx of pain or 
distress. Oral care performed.

## 2020-09-14 NOTE — NUR
NURSE HAND-OFF REPORT: 



Latest Vital Signs: Temperature 98.8 , Pulse 58 , B/P 108 /73 , Respiratory Rate 22 , O2 SAT 
100 , Mechanical Ventilator, O2 Flow Rate 15.0 .  

Vital Sign Comment: STABLe



EKG Rhythm: Sinus Bradycardia

Rhythm change?: N 

MD Notified?: Dr. Joslyn MENDENHALL Response: In person notified of lowest HR of 42



Latest Momin Fall Score: 70  

Fall Risk: High Risk 

Safety Measures: Call light Within Reach, Bed Alarm Zone 1, Side Rails Side Rails x3, Bed 
position Low and Locked.

Fall Precautions: 

Yellow Socks

Door Sign

Patient Fall Education



Report given to Nirav. Plan of care endorsed.

## 2020-09-14 NOTE — NUR
CASE MANAGEMENT: REVIEW 



9/14/2020



SI:SEPSIS. PNA. 

98.9   59   24   124/72   99% ON MECH VENT FIO2 100

WBC 12.9   H/H 8.1/24.6     NA+156   K+3.1   CL-120   BUN 32      CA+ 7.0   ALB 1.1 

ABG: pO2 52.7  HCO3 26.5  O2 SAT% 84.7  



IS:IV D5 BOLUS X1

IV LEVOPHED PROTOCOL ~TITRATED 

IV DOPAMINE PROTOCOL ~TITRATED 

IV AMIKACIN Q36HR

GT DEPAKENE TID

CHEST X-RAY -improved aeration of both lungs.



***ICU*** 



PLAN OF CARE: 

PLACE RECTAL TUBE 

ADJUST FiO2 

CONT REPARATORY CARE ~WEAN

## 2020-09-14 NOTE — INFECTIOUS DISEASES PROG NOTE
Assessment/Plan








ASSESSMENT:


sp code blue 8/26





Septic Shock; SP


Fever, recurrent


Leukocytosis; recurrent; improved


   -9/9 u/a no pyuria


   -9/8 Bcx NTD (Picc line)


   -9/6 Bcx NTD


            ucx Neg


             sp cx C. parapsilopsis


  -8/26 u/a no pyuria





Pneumonia.- COVID 19 neg x3


   Acute hypoxic resp failure on VM> NRB 15l 100%; hypoxic on ABG> now VDRF 8/26

- Fio2 80% >100% 8/28> 60% 9/1 >80% 9/2   >95% 9/10 >90% 9/14


      -9/14 CXR: Improved aeration of both lungs.


       -9/5 CXR:Small bilateral pleural effusions with minor edema.  Stable 

edema with  mild worsening in the degree of pleural effusion on the right.


         -9/1 sp cx Neg


         8/31 CXR: Extensive bilateral interstitial and airspace disease 

appears similar to the prior exam. Moderate to large bilateral pleural 

effusions appear unchanged.


   8/28 CXR: Increasing left upper lobe dense consolidation and likely 

increasing bilateral pleural fluid. Persistent diffuse dense consolidation 

elsewhere


            -8/26 sp cx normal resp lilliam


             -8/25 CXR: Increased atelectasis of the right lung, since prior 

exam of 3 days earlier. New or increased right pleural effusion. Increased left 

basilar consolidation and/or pleural fluid 


          -COVID Rapid PCR neg 8/23, 8/23, 8/26


          -8/22 spc x Group G strep


          -8/22 CXR:   Reduced lung volumes.  Patchy bilateral predominantly 

interstitial  pulmonary opacities.  Could be from edema and/or pneumonia.  

There is a broader differential.


 


        -legionella ag urine, blasto ab, Histo ab, HIV ab screen, JOY, ANCA neg





Persistent, high grade bacteremia-


     -8/22 Bcx 4/4 sets S. haemolyticus; 8/23 Bcx 3/4 S/ epi; 8/27 Bcx 1.4 S. 

warnerri; 8/29 Bcx Neg


     -2d echo: no vegetaions seen





          ua/ wbc 10-15, nit neg, leuk +1; ucx Neg





DAVID; 


 -supratherapeutic vanco levels





-Seizure disorder.


- Hypothyroidism.


- Down syndrome.





History of PEG tube placement.


[facility]





PLAN:





Dc Meropenem#18(abx d #23/21) given resp decompensation


Empiric Amikacin #7/7 pending repeat cultures





          9/4/20 SP Daptomycin #11


          9/2 SP MIcafungin #7, Linezolid #5


   8/28 SP Azithromycin #7/7


   8/26 SP Ceftriaxone #2


   8/25 SP IV Vancomycin #4, Zosyn #4


   8/22 SP Cefepime x1, Flagyl x1





- Monitor CBC, BMP.


.f/u  Repeat cx


- COVID neg x3


- Monitor chest x-ray.


- Monitor the patient's clinical course and labs.  Based on those, we will do 

further recommendation.


-f/u Bcx from picc line, cdiff if diarrhea


-f/u Fungitell, asp ag, flow cytometry


-Once stable, CT chest/abd/pw/ given persistent leukocytosis





Thank you, Dr. Cherry, for allowing me to participate in the care of


this patient.  I will follow the patient with you at this


hospitalization.








Discussed with RN





Subjective


Allergies:  


Coded Allergies:  


     No Known Allergies (Unverified , 1/8/19)


afebrile


leukocytosis improving


Bcx NTD


Fio2 90%


CXR improving





Objective





Last 24 Hour Vital Signs








  Date Time  Temp Pulse Resp B/P (MAP) Pulse Ox O2 Delivery O2 Flow Rate FiO2


 


9/14/20 11:00  55 26 111/70 (84) 100   


 


9/14/20 10:59  55 26     90


 


9/14/20 10:00  58 26 121/104 (110) 100   


 


9/14/20 09:00  67 26 128/68 (88) 100   


 


9/14/20 08:56        100


 


9/14/20 08:00 98.9 59 24 124/72 (89) 99   


 


9/14/20 08:00      Mechanical Ventilator  





      Mechanical Ventilator  


 


9/14/20 08:00        100


 


9/14/20 07:15  45 26     80


 


9/14/20 07:00  47 26 115/64 (81) 100   


 


9/14/20 06:00  51 26 134/71 (92) 100   


 


9/14/20 05:00  51 26 116/61 (79) 100   


 


9/14/20 04:00  85      


 


9/14/20 04:00 99.1 85 25 117/71 (86) 98   


 


9/14/20 04:00      Mechanical Ventilator  





      Mechanical Ventilator  


 


9/14/20 04:00        80


 


9/14/20 03:00  67 34 139/79 (99) 100   


 


9/14/20 02:50  67 26     80


 


9/14/20 02:00  69 29 119/81 (94) 100   


 


9/14/20 01:00  57 16 119/66 (83) 100   


 


9/14/20 00:00        80


 


9/14/20 00:00  62      


 


9/14/20 00:00      Mechanical Ventilator  





      Mechanical Ventilator  


 


9/14/20 00:00 98.8 62 15 119/71 (87) 100   


 


9/13/20 23:11  63 26     80


 


9/13/20 23:00  63 17 133/82 (99) 100   


 


9/13/20 22:00  66 21 115/29 (57) 100   


 


9/13/20 21:00  60 20 101/56 (71) 100   


 


9/13/20 20:00      Mechanical Ventilator  





      Mechanical Ventilator  


 


9/13/20 20:00 98.5 64 20 111/89 (96) 100   


 


9/13/20 20:00        80


 


9/13/20 19:34  61      


 


9/13/20 19:29  76 26     80


 


9/13/20 19:00  51 26 120/72 (88) 94   


 


9/13/20 19:00  60 22 116/67 (83) 100   


 


9/13/20 18:00  51 26 120/72 (88) 94   


 


9/13/20 17:00  66 25 109/54 (72) 97   


 


9/13/20 16:00 98.1 67 21 96/72 (80) 97   


 


9/13/20 16:00        80


 


9/13/20 16:00      Mechanical Ventilator  





      Mechanical Ventilator  


 


9/13/20 15:54  60      


 


9/13/20 15:33  75 26     80


 


9/13/20 15:00  58 22 133/73 (93) 95   


 


9/13/20 14:00  47 26 113/59 (77) 94   








Height (Feet):  5


Height (Inches):  3.00


Weight (Pounds):  172


HEENT:  No pale conjunctivae.  No icterus. ETT in place


NECK:  No lymphadenopathy.


CHEST:  Coarse breathing sounds.


HEART:  S1 and S2.


ABDOMEN:  Soft.  PEG tube in place.


EXTREMITIES:  No cyanosis at this time .


SKIN: no rash





Laboratory Tests








Test


  9/14/20


05:10 9/14/20


08:04


 


White Blood Count


  12.9 K/UL


(4.8-10.8)  H 


 


 


Red Blood Count


  2.49 M/UL


(4.20-5.40)  L 


 


 


Hemoglobin


  8.1 G/DL


(12.0-16.0)  L 


 


 


Hematocrit


  24.6 %


(37.0-47.0)  L 


 


 


Mean Corpuscular Volume 99 FL (80-99)   


 


Mean Corpuscular Hemoglobin


  32.4 PG


(27.0-31.0)  H 


 


 


Mean Corpuscular Hemoglobin


Concent 32.8 G/DL


(32.0-36.0) 


 


 


Red Cell Distribution Width


  15.4 %


(11.6-14.8)  H 


 


 


Platelet Count


  166 K/UL


(150-450) 


 


 


Mean Platelet Volume


  8.9 FL


(6.5-10.1) 


 


 


Neutrophils (%) (Auto)


  % (45.0-75.0)


  


 


 


Lymphocytes (%) (Auto)


  % (20.0-45.0)


  


 


 


Monocytes (%) (Auto)  % (1.0-10.0)   


 


Eosinophils (%) (Auto)  % (0.0-3.0)   


 


Basophils (%) (Auto)  % (0.0-2.0)   


 


Differential Total Cells


Counted 100  


  


 


 


Neutrophils % (Manual) 97 % (45-75)  H 


 


Lymphocytes % (Manual) 2 % (20-45)  L 


 


Monocytes % (Manual) 1 % (1-10)   


 


Eosinophils % (Manual) 0 % (0-3)   


 


Basophils % (Manual) 0 % (0-2)   


 


Band Neutrophils 0 % (0-8)   


 


Platelet Estimate Adequate   


 


Platelet Morphology Normal   


 


Hypochromasia 2+   


 


Anisocytosis 1+   


 


Sodium Level


  156 MMOL/L


(136-145)  H 


 


 


Potassium Level


  3.1 MMOL/L


(3.5-5.1)  L 


 


 


Chloride Level


  120 MMOL/L


()  H 


 


 


Carbon Dioxide Level


  28 MMOL/L


(21-32) 


 


 


Anion Gap


  9 mmol/L


(5-15) 


 


 


Blood Urea Nitrogen


  32 mg/dL


(7-18)  H 


 


 


Creatinine


  0.8 MG/DL


(0.55-1.30) 


 


 


Estimat Glomerular Filtration


Rate > 60 mL/min


(>60) 


 


 


Glucose Level


  179 MG/DL


()  H 


 


 


Calcium Level


  7.0 MG/DL


(8.5-10.1)  L 


 


 


Phosphorus Level


  2.7 MG/DL


(2.5-4.9) 


 


 


Magnesium Level


  1.8 MG/DL


(1.8-2.4) 


 


 


Total Bilirubin


  0.4 MG/DL


(0.2-1.0) 


 


 


Aspartate Amino Transf


(AST/SGOT) 26 U/L (15-37)


  


 


 


Alanine Aminotransferase


(ALT/SGPT) 69 U/L (12-78)


  


 


 


Alkaline Phosphatase


  52 U/L


() 


 


 


Total Protein


  4.4 G/DL


(6.4-8.2)  L 


 


 


Albumin


  1.1 G/DL


(3.4-5.0)  L 


 


 


Globulin 3.3 g/dL   


 


Albumin/Globulin Ratio


  0.3 (1.0-2.7)


L 


 


 


Arterial Blood pH


  


  7.425


(7.350-7.450)


 


Arterial Blood Partial


Pressure CO2 


  41.3 mmHg


(35.0-45.0)


 


Arterial Blood Partial


Pressure O2 


  52.7 mmHg


(75.0-100.0)  L


 


Arterial Blood HCO3


  


  26.5 mmol/L


(22.0-26.0)  H


 


Arterial Blood Oxygen


Saturation 


  84.7 %


()  *L


 


Arterial Blood Base Excess  1.9 (-2-2)  


 


Cole Test  Positive  











Current Medications








 Medications


  (Trade)  Dose


 Ordered  Sig/Didi


 Route


 PRN Reason  Start Time


 Stop Time Status Last Admin


Dose Admin


 


 Acetaminophen


  (Tylenol)  650 mg  Q4H  PRN


 GT


 FEVER  8/26/20 11:45


 9/25/20 11:44  9/7/20 03:17


 


 


 Amikacin Protocol


  (Amikacin


 pharmacy to dose)  1 ea  DAILY  PRN


 MISC


 Per rx protocol  9/8/20 12:00


 10/8/20 11:59   


 


 


 Amikacin Sulfate


 1000 mg/Sodium


 Chloride  114 ml @ 


 114 mls/hr  Q36H


 IV


   9/8/20 14:00


 9/15/20 13:59  9/13/20 01:51


 


 


 Chlorhexidine


 Gluconate


  (Beatrice-Hex 2%)  1 applic  DAILY@2000


 TOPIC


   8/31/20 20:00


 11/29/20 19:59  9/13/20 19:47


 


 


 Clotrimazole


  (Lotrimin)  1 applic  Q12HR


 TOPIC


   8/30/20 13:00


 11/28/20 12:59  9/14/20 11:16


 


 


 Dextrose  500 ml @ 


 500 mls/hr  ONCE  ONCE


 IV


   9/14/20 12:30


 9/14/20 13:29  9/14/20 13:07


 


 


 Dextrose


  (Dextrose 50%)  25 ml  Q30M  PRN


 IV


 Hypoglycemia  8/26/20 11:30


 11/20/20 11:29   


 


 


 Dextrose


  (Dextrose 50%)  50 ml  Q30M  PRN


 IV


 Hypoglycemia  8/26/20 11:30


 11/20/20 11:29   


 


 


 Dopamine HCl/


 Dextrose  250 ml @ 0


 mls/hr  Q24H


 IV


   9/4/20 11:15


 12/3/20 11:14  9/6/20 19:47


 


 


 Hydrocortisone


  (Solu-CORTEF)  100 mg  EVERY 8  HOURS


 IV


   8/30/20 14:00


 11/28/20 13:59  9/14/20 05:56


 


 


 Levothyroxine


 Sodium


  (Synthroid)  75 mcg  DAILY


 GT


   8/27/20 09:00


 9/22/20 08:59  9/14/20 11:16


 


 


 Meropenem 1 gm/


 Sodium Chloride  55 ml @ 


 110 mls/hr  Q8H


 IVPB


   9/6/20 12:00


 9/16/20 23:59  9/14/20 13:00


 


 


 Midodrine


  (Pro-Amatine)  10 mg  Q8HR


 GT


   8/28/20 14:00


 11/26/20 13:59  9/14/20 05:57


 


 


 Norepinephrine


 Bitartrate 16 mg/


 Dextrose  500 ml @ 0


 mls/hr  Q24H


 IV


   8/31/20 07:45


 9/29/20 12:59  9/4/20 08:43


 


 


 Ondansetron HCl


  (Zofran)  4 mg  Q6H  PRN


 IVP


 Nausea & Vomiting  8/26/20 12:00


 9/21/20 11:59   


 


 


 Pantoprazole


  (Protonix)  40 mg  DAILY


 IV


   8/27/20 09:00


 9/22/20 08:59  9/14/20 11:16


 


 


 Phenylephrine HCl


 50 mg/Dextrose  250 ml @ 0


 mls/hr  Q24H  PRN


 IV


 For hypotension  8/29/20 17:45


 9/28/20 17:44  8/31/20 01:49


 


 


 Polyethylene


 Glycol


  (Miralax)  17 gm  DAILYPRN  PRN


 GT


 Constipation  8/26/20 12:00


 9/21/20 11:59   


 


 


 Valproic Acid


  (Depakene)  500 mg  EVERY 8  HOURS


 GT


   8/26/20 14:00


 9/21/20 21:59  9/14/20 05:57


 

















Rema Gomes M.D. Sep 14, 2020 13:31

## 2020-09-14 NOTE — GENERAL PROGRESS NOTE
Assessment/Plan


Assessment/Plan:


1. History of Down syndrome.


2. Dysphagia with G-tube.


3. Seizure disorder.


4. Hypothyroidism.


5. DAVID.


6. Pneumonia.


7. Sepsis.








fu H&H


ppi


GTF


hold GI procedures for now


check C.diff





Subjective


ROS Limited/Unobtainable:  No


Allergies:  


Coded Allergies:  


     No Known Allergies (Unverified , 1/8/19)





Objective





Last 24 Hour Vital Signs








  Date Time  Temp Pulse Resp B/P (MAP) Pulse Ox O2 Delivery O2 Flow Rate FiO2


 


9/14/20 11:00  55 26 111/70 (84) 100   


 


9/14/20 10:59  55 26     90


 


9/14/20 10:00  58 26 121/104 (110) 100   


 


9/14/20 09:00  67 26 128/68 (88) 100   


 


9/14/20 08:56        100


 


9/14/20 08:00 98.9 59 24 124/72 (89) 99   


 


9/14/20 08:00      Mechanical Ventilator  





      Mechanical Ventilator  


 


9/14/20 08:00        100


 


9/14/20 07:15  45 26     80


 


9/14/20 07:00  47 26 115/64 (81) 100   


 


9/14/20 06:00  51 26 134/71 (92) 100   


 


9/14/20 05:00  51 26 116/61 (79) 100   


 


9/14/20 04:00  85      


 


9/14/20 04:00 99.1 85 25 117/71 (86) 98   


 


9/14/20 04:00      Mechanical Ventilator  





      Mechanical Ventilator  


 


9/14/20 04:00        80


 


9/14/20 03:00  67 34 139/79 (99) 100   


 


9/14/20 02:50  67 26     80


 


9/14/20 02:00  69 29 119/81 (94) 100   


 


9/14/20 01:00  57 16 119/66 (83) 100   


 


9/14/20 00:00        80


 


9/14/20 00:00  62      


 


9/14/20 00:00      Mechanical Ventilator  





      Mechanical Ventilator  


 


9/14/20 00:00 98.8 62 15 119/71 (87) 100   


 


9/13/20 23:11  63 26     80


 


9/13/20 23:00  63 17 133/82 (99) 100   


 


9/13/20 22:00  66 21 115/29 (57) 100   


 


9/13/20 21:00  60 20 101/56 (71) 100   


 


9/13/20 20:00      Mechanical Ventilator  





      Mechanical Ventilator  


 


9/13/20 20:00 98.5 64 20 111/89 (96) 100   


 


9/13/20 20:00        80


 


9/13/20 19:34  61      


 


9/13/20 19:29  76 26     80


 


9/13/20 19:00  51 26 120/72 (88) 94   


 


9/13/20 19:00  60 22 116/67 (83) 100   


 


9/13/20 18:00  51 26 120/72 (88) 94   


 


9/13/20 17:00  66 25 109/54 (72) 97   


 


9/13/20 16:00 98.1 67 21 96/72 (80) 97   


 


9/13/20 16:00        80


 


9/13/20 16:00      Mechanical Ventilator  





      Mechanical Ventilator  


 


9/13/20 15:54  60      


 


9/13/20 15:33  75 26     80


 


9/13/20 15:00  58 22 133/73 (93) 95   


 


9/13/20 14:00  47 26 113/59 (77) 94   


 


9/13/20 13:00 98.2 49 26 114/59 (77) 92   


 


9/13/20 12:26  55      

















Intake and Output  


 


 9/13/20 9/14/20





 19:00 07:00


 


Intake Total 942.776 ml 930 ml


 


Output Total 685 ml 680 ml


 


Balance 257.776 ml 250 ml


 


  


 


IV Total 242.776 ml 110 ml


 


Tube Feeding 600 ml 720 ml


 


Other 100 ml 100 ml


 


Output Urine Total 685 ml 680 ml


 


Stool Total  0 ml


 


# Bowel Movements 2 2








Laboratory Tests


9/14/20 05:10: 


White Blood Count 12.9H, Red Blood Count 2.49L, Hemoglobin 8.1L, Hematocrit 

24.6L, Mean Corpuscular Volume 99, Mean Corpuscular Hemoglobin 32.4H, Mean 

Corpuscular Hemoglobin Concent 32.8, Red Cell Distribution Width 15.4H, 

Platelet Count 166, Mean Platelet Volume 8.9, Neutrophils (%) (Auto) , 

Lymphocytes (%) (Auto) , Monocytes (%) (Auto) , Eosinophils (%) (Auto) , 

Basophils (%) (Auto) , Differential Total Cells Counted 100, Neutrophils % (

Manual) 97H, Lymphocytes % (Manual) 2L, Monocytes % (Manual) 1, Eosinophils % (

Manual) 0, Basophils % (Manual) 0, Band Neutrophils 0, Platelet Estimate 

Adequate, Platelet Morphology Normal, Hypochromasia 2+, Anisocytosis 1+, Sodium 

Level 156H, Potassium Level 3.1L, Chloride Level 120H, Carbon Dioxide Level 28, 

Anion Gap 9, Blood Urea Nitrogen 32H, Creatinine 0.8, Estimat Glomerular 

Filtration Rate > 60, Glucose Level 179H, Calcium Level 7.0L, Phosphorus Level 

2.7, Magnesium Level 1.8, Total Bilirubin 0.4, Aspartate Amino Transf (AST/SGOT

) 26, Alanine Aminotransferase (ALT/SGPT) 69, Alkaline Phosphatase 52, Total 

Protein 4.4L, Albumin 1.1L, Globulin 3.3, Albumin/Globulin Ratio 0.3L


9/14/20 08:04: 


Arterial Blood pH 7.425, Arterial Blood Partial Pressure CO2 41.3, Arterial 

Blood Partial Pressure O2 52.7L, Arterial Blood HCO3 26.5H, Arterial Blood 

Oxygen Saturation 84.7*L, Arterial Blood Base Excess 1.9, Cole Test Positive


Height (Feet):  5


Height (Inches):  3.00


Weight (Pounds):  172


General Appearance:  no apparent distress


EENT:  normal ENT inspection


Neck:  supple


Cardiovascular:  normal rate


Respiratory/Chest:  decreased breath sounds


Abdomen:  normal bowel sounds, non tender, soft


Extremities:  non-tender











Ted Nagy MD Sep 14, 2020 12:10

## 2020-09-15 VITALS — DIASTOLIC BLOOD PRESSURE: 82 MMHG | SYSTOLIC BLOOD PRESSURE: 124 MMHG

## 2020-09-15 VITALS — SYSTOLIC BLOOD PRESSURE: 111 MMHG | DIASTOLIC BLOOD PRESSURE: 59 MMHG

## 2020-09-15 VITALS — DIASTOLIC BLOOD PRESSURE: 97 MMHG | SYSTOLIC BLOOD PRESSURE: 115 MMHG

## 2020-09-15 VITALS — DIASTOLIC BLOOD PRESSURE: 168 MMHG | SYSTOLIC BLOOD PRESSURE: 118 MMHG

## 2020-09-15 VITALS — DIASTOLIC BLOOD PRESSURE: 72 MMHG | SYSTOLIC BLOOD PRESSURE: 120 MMHG

## 2020-09-15 VITALS — SYSTOLIC BLOOD PRESSURE: 115 MMHG | DIASTOLIC BLOOD PRESSURE: 65 MMHG

## 2020-09-15 VITALS — SYSTOLIC BLOOD PRESSURE: 110 MMHG | DIASTOLIC BLOOD PRESSURE: 72 MMHG

## 2020-09-15 VITALS — DIASTOLIC BLOOD PRESSURE: 62 MMHG | SYSTOLIC BLOOD PRESSURE: 137 MMHG

## 2020-09-15 VITALS — SYSTOLIC BLOOD PRESSURE: 160 MMHG | DIASTOLIC BLOOD PRESSURE: 99 MMHG

## 2020-09-15 VITALS — SYSTOLIC BLOOD PRESSURE: 140 MMHG | DIASTOLIC BLOOD PRESSURE: 66 MMHG

## 2020-09-15 VITALS — SYSTOLIC BLOOD PRESSURE: 116 MMHG | DIASTOLIC BLOOD PRESSURE: 84 MMHG

## 2020-09-15 VITALS — DIASTOLIC BLOOD PRESSURE: 54 MMHG | SYSTOLIC BLOOD PRESSURE: 124 MMHG

## 2020-09-15 VITALS — DIASTOLIC BLOOD PRESSURE: 88 MMHG | SYSTOLIC BLOOD PRESSURE: 126 MMHG

## 2020-09-15 VITALS — DIASTOLIC BLOOD PRESSURE: 57 MMHG | SYSTOLIC BLOOD PRESSURE: 107 MMHG

## 2020-09-15 VITALS — SYSTOLIC BLOOD PRESSURE: 119 MMHG | DIASTOLIC BLOOD PRESSURE: 101 MMHG

## 2020-09-15 VITALS — SYSTOLIC BLOOD PRESSURE: 138 MMHG | DIASTOLIC BLOOD PRESSURE: 118 MMHG

## 2020-09-15 VITALS — SYSTOLIC BLOOD PRESSURE: 125 MMHG | DIASTOLIC BLOOD PRESSURE: 74 MMHG

## 2020-09-15 VITALS — SYSTOLIC BLOOD PRESSURE: 141 MMHG | DIASTOLIC BLOOD PRESSURE: 75 MMHG

## 2020-09-15 VITALS — SYSTOLIC BLOOD PRESSURE: 123 MMHG | DIASTOLIC BLOOD PRESSURE: 67 MMHG

## 2020-09-15 VITALS — SYSTOLIC BLOOD PRESSURE: 135 MMHG | DIASTOLIC BLOOD PRESSURE: 79 MMHG

## 2020-09-15 VITALS — SYSTOLIC BLOOD PRESSURE: 128 MMHG | DIASTOLIC BLOOD PRESSURE: 66 MMHG

## 2020-09-15 VITALS — DIASTOLIC BLOOD PRESSURE: 95 MMHG | SYSTOLIC BLOOD PRESSURE: 126 MMHG

## 2020-09-15 VITALS — SYSTOLIC BLOOD PRESSURE: 132 MMHG | DIASTOLIC BLOOD PRESSURE: 90 MMHG

## 2020-09-15 VITALS — DIASTOLIC BLOOD PRESSURE: 56 MMHG | SYSTOLIC BLOOD PRESSURE: 114 MMHG

## 2020-09-15 LAB
ADD MANUAL DIFF: YES
ALBUMIN SERPL-MCNC: 1.3 G/DL (ref 3.4–5)
ALBUMIN/GLOB SERPL: 0.4 {RATIO} (ref 1–2.7)
ALP SERPL-CCNC: 54 U/L (ref 46–116)
ALT SERPL-CCNC: 86 U/L (ref 12–78)
ANION GAP SERPL CALC-SCNC: 7 MMOL/L (ref 5–15)
AST SERPL-CCNC: 27 U/L (ref 15–37)
BILIRUB SERPL-MCNC: 0.6 MG/DL (ref 0.2–1)
BUN SERPL-MCNC: 31 MG/DL (ref 7–18)
CALCIUM SERPL-MCNC: 7.6 MG/DL (ref 8.5–10.1)
CHLORIDE SERPL-SCNC: 116 MMOL/L (ref 98–107)
CO2 SERPL-SCNC: 28 MMOL/L (ref 21–32)
CREAT SERPL-MCNC: 0.8 MG/DL (ref 0.55–1.3)
ERYTHROCYTE [DISTWIDTH] IN BLOOD BY AUTOMATED COUNT: 15.2 % (ref 11.6–14.8)
GLOBULIN SER-MCNC: 3.5 G/DL
HCT VFR BLD CALC: 25.1 % (ref 37–47)
HGB BLD-MCNC: 8.4 G/DL (ref 12–16)
MCV RBC AUTO: 99 FL (ref 80–99)
PHOSPHATE SERPL-MCNC: 6.3 MG/DL (ref 2.5–4.9)
PLATELET # BLD: 150 K/UL (ref 150–450)
POTASSIUM SERPL-SCNC: 3.4 MMOL/L (ref 3.5–5.1)
RBC # BLD AUTO: 2.54 M/UL (ref 4.2–5.4)
SODIUM SERPL-SCNC: 151 MMOL/L (ref 136–145)
WBC # BLD AUTO: 16.3 K/UL (ref 4.8–10.8)

## 2020-09-15 RX ADMIN — SODIUM CHLORIDE SCH MLS/HR: 900 INJECTION, SOLUTION INTRAVENOUS at 07:45

## 2020-09-15 RX ADMIN — MIDODRINE HYDROCHLORIDE SCH MG: 10 TABLET ORAL at 13:28

## 2020-09-15 RX ADMIN — MIDODRINE HYDROCHLORIDE SCH MG: 10 TABLET ORAL at 22:18

## 2020-09-15 RX ADMIN — VALPROIC ACID SCH MG: 250 SOLUTION ORAL at 13:28

## 2020-09-15 RX ADMIN — CHLORHEXIDINE GLUCONATE SCH APPLIC: 213 SOLUTION TOPICAL at 19:38

## 2020-09-15 RX ADMIN — DOPAMINE HYDROCHLORIDE IN DEXTROSE SCH MLS/HR: 1.6 INJECTION, SOLUTION INTRAVENOUS at 10:50

## 2020-09-15 RX ADMIN — HYDROCORTISONE SODIUM SUCCINATE SCH MG: 100 INJECTION, POWDER, FOR SOLUTION INTRAMUSCULAR; INTRAVENOUS at 13:28

## 2020-09-15 RX ADMIN — HYDROCORTISONE SODIUM SUCCINATE SCH MG: 100 INJECTION, POWDER, FOR SOLUTION INTRAMUSCULAR; INTRAVENOUS at 05:34

## 2020-09-15 RX ADMIN — PANTOPRAZOLE SODIUM SCH MG: 40 INJECTION, POWDER, FOR SOLUTION INTRAVENOUS at 08:36

## 2020-09-15 RX ADMIN — HYDROCORTISONE SODIUM SUCCINATE SCH MG: 100 INJECTION, POWDER, FOR SOLUTION INTRAMUSCULAR; INTRAVENOUS at 22:18

## 2020-09-15 RX ADMIN — MIDODRINE HYDROCHLORIDE SCH MG: 10 TABLET ORAL at 05:34

## 2020-09-15 RX ADMIN — VALPROIC ACID SCH MG: 250 SOLUTION ORAL at 05:34

## 2020-09-15 RX ADMIN — VALPROIC ACID SCH MG: 250 SOLUTION ORAL at 22:18

## 2020-09-15 NOTE — NUR
RD ASSESSMENT & RECOMMENDATIONS

SEE CARE ACTIVITY FOR COMPLETE ASSESSMENT



DAILY ESTIMATED NEEDS:

Needs based on CRITICAL CARE, 50kg  

22-27  kcals/kg 

6675-1883  total kcals

1.25-2  g protein/kg

  g total protein 

25-30  mL/kg

6279-3513  total fluid mLs



NUTRITION DIAGNOSIS:

Swallowing difficulty r/t dysphagia as evidenced by pt w/ Downs Syndrome,

PEG dep for all nutritional needs, now intubated, now off pressor support,

ICU status.





CURRENT TF:Vital AF 1.2 @ 60ml/hr x 22 hrs  







ENTERAL NUTRITION RECOMMENDATIONS:

VITAL AF 1.2 @ 50ml/hr x22 hrs   to provide 1100ml, 1320 kcal, 83g pro, 892ml free H2O  



- LOWER goal rate to 50ml/hr not to exceed est kcal needs

      -> will meet 100% est kcal/prot needs

- Hold TF 1 hr before and after synthroid meds.

- Flush per MD, HOB over 30 degrees.









ADDITIONAL RECOMMENDATIONS:

1) Ht of 61 inches per SNF; recalibrate bed scale for accurate CBW  

2) Monitor BG closely w/ Solucortef-> rec jacklyn for BG control  

3) Monitor hemodynamic stability: pressors held at this time 

4) Wound healing: Continue Vit C 250mg QD + Marcio BID 

5) Monitor lytes, replete as needed (low K)/ trend phos (elev 9/15) 

6) Monitor TF tolerance: h/o elev residuals, now well tolerated 

.

## 2020-09-15 NOTE — NUR
NURSE HAND-OFF REPORT: 



Latest Vital Signs: Temperature 98.4 , Pulse 60 , B/P 120 /72 , Respiratory Rate 20 , O2 SAT 
100 , Mechanical Ventilator, O2 Flow Rate 15.0 .  

Vital Sign Comment: 



EKG Rhythm: Sinus Rhythm

Rhythm change?: N 

MD Notified?: -

MD Response: 



Latest Momin Fall Score: 70  

Fall Risk: High Risk 

Safety Measures: Call light Within Reach, Bed Alarm Zone 1, Side Rails Side Rails x3, Bed 
position Low and Locked.

Fall Precautions: 

Yellow Socks

Door Sign

Patient Fall Education



Report given to Nirav RN.

## 2020-09-15 NOTE — NUR
NURSE NOTES:

PATIENT AWOKE, OPEN EYES, ABLE TO EYE CONTACT BUT DID NOT FOLLOW COMMAND, ON ETT TO VENT, AC 
26//FIO2 100%/PEEP 10, O2 SATURATION OVER 99% NOTED, HR 60'S/MIN, SR NOTED, G TUBE 
INTACT AND PATENT, ONGOING VITAL AF 1.2 AT 60ML/HR, RESIDUE 20ML NOTED, KEPT HOB OVER 30 
DEGREES, ASPIRATION AND SZ PRECAUTION, ABDOMEN SOFT, NON TENDER, RECTAL TUBE INTACT AND 
PATENT, BROWN COLOR STOOL OUTED, F/C INTACT AND PATENT, MARY COLOR URINE OUTED, PICC LINE 
TO LEFT UPPER ARM, INTACT AND PATENT, TKO STATUS, ON P200 BED, MADE LOWER BED POSITION, ON 
BED ALARM AND LOCKED, PLACE CALL LIGHT WITHIN REACH, WILL CONTINUE TO MONITOR.

## 2020-09-15 NOTE — NEPHROLOGY PROGRESS NOTE
Assessment/Plan


Problem List:  


(1) DVAID (acute kidney injury)


(2) Respiratory failure requiring intubation


(3) Down's syndrome


(4) Seizure disorder


(5) Hypothyroidism


Assessment





Acute renal failure, likely due to hypotension


Acute respiratory distress, hypoxia


Seizure disorder


Hypothyroidism


Down syndrome


Full code











Fluid challenge with IV fluids and albumin


Midodrine for BP above 100 systolic


Check TSH level


Check


Correct level


Monitor renal parameters


Urine studies


Per orders


Plan


September 15: Labs reviewed.  Patient continues to be on ventilator.  D5W for 

high sodium and also potassium chloride intravenously as supplement given.  

Hemoglobin 8.4.  Continue to monitor electrolytes and renal parameters.


September 14: Labs reviewed.  Low potassium and high sodium noted.  Hemoglobin 

8.1 stable.  Aim to correct abnormal electrolyte.  Continue rest.  Will give 2 

boluses of D5W 500 cc.


September 13: Lab reviewed.  Abnormal electrolytes noted and addressed.


September 12: Labs reviewed.  Potassium supplement given.  Patient remains full 

code.  Continue per consultants.


September 11: Lab reviewed.  Electrolyte abnormalities addressed.  Continue per 

pulmonary and ID.


September 10: Lab reviewed.  Status unchanged.  Serum sodium 151 unchanged.  

Stable from renal standpoint of view.


September 9: Labs reviewed.  Status quo.  D5W 500 cc IV ordered.  Continue to 

monitor renal parameters.


September 8: Status quo.  Labs reviewed.  Overall condition unchanged.  Patient 

was transfused and hemoglobin higher.  Continue current management.  Patient 

remains full code.


September 7: Status quo.  Overall condition poor.  Very low albumin.  

Edematous.  Hypotensive.  Hemoglobin lower.  Anemia work-up ordered.  I favor 

transfusion 2 units of packed RBCs.  Patient remains full code.  I favor 

supportive care only.  Will discuss.


September 6: Electrolyte abnormalities addressed.  Serum creatinine lower.  

Continue per current management.


September 5: Status unchanged.  Lab reviewed.  Serum potassium 2.7.  IV 

potassium chloride ordered.  Serum creatinine low at 1.6 stable.  Blood 

pressure 90s systolic


September 4: Status quo.  Labs reviewed.  Renal parameters stable.  Serum 

creatinine down to 1.6.  Medication list reviewed.  Continues to be on 

midodrine.  Continue per consultants.


September 3: Status quo.  Labs reviewed.  Electrolytes adjusted.  Serum 

creatinine down to 1.8.  Continue per consultants.


September 2: Status quo.  Labs reviewed.  Phosphorus supplement IV given.  

Serum creatinine 2.  Continue per consultants.


September 1: Requires less pressors.  Albumin bolus given.  1 dose of Lasix IV 

ordered as the patient severely edematous.  Patient serum albumin is very low.  

Continue per consultants.


August 31: Continues to be intubated.  Labs reviewed.  Serum creatinine 1.9 

unchanged.  Blood pressure more stable.  Off 1 of the pressors.  Continue to 

monitor renal parameters.  Continue per consultants.  Patient now on 

hydrocortisone 100 mg every 8 hours.  Will decrease IV fluid.  Normal saline 

down to 50 cc an hour.


August 30: Intubated.  Labs reviewed.  Creatinine 1.9 unchanged.  Continue same 

treatment plan.  Per consultants.  Overall poor prognosis since the patient 

remains on pressors and her pulmonary status is worsening.


August 29: Remains intubated.  Labs reviewed.  Creatinine 1.9.  Blood pressure 

systolic 90s.  Continue per consultants.


August 28: Remains intubated.  Labs reviewed.  Serum creatinine lower to 2.  

Vancomycin level lower.  Remains hypotensive on pressors.  Will increase 

midodrine to 10 mg every 8 hours.  Continue per consultants.  Continue to 

monitor renal parameters.


August 27: Patient now in ICU.  Intubated.  On pressors.  Labs reviewed.  Will 

increase midodrine.  Aim to keep blood pressure over 100 systolic.  Will give 

albumin bolus.  Will check vancomycin level which was elevated when checked 

previously on August 24.  Will monitor renal parameters.  Continue per 

consultants.





Subjective


ROS Limited/Unobtainable:  Yes





Objective


Objective





Last 24 Hour Vital Signs








  Date Time  Temp Pulse Resp B/P (MAP) Pulse Ox O2 Delivery O2 Flow Rate FiO2


 


9/15/20 12:31  70 26     60


 


9/15/20 12:00        60


 


9/15/20 12:00      Mechanical Ventilator  





      Mechanical Ventilator  


 


9/15/20 12:00  62 18 115/65 (82) 96   


 


9/15/20 12:00  63      


 


9/15/20 11:00  61 23 137/62 (87) 90   


 


9/15/20 10:50    160/99    


 


9/15/20 10:00  52 26 160/99 (119) 93   


 


9/15/20 09:00  57 23 138/118 (125) 96   


 


9/15/20 08:00 98.8 55 24 141/75 (97) 98   


 


9/15/20 08:00        60


 


9/15/20 08:00      Mechanical Ventilator  





      Mechanical Ventilator  


 


9/15/20 07:47  57      


 


9/15/20 07:45    140/66    


 


9/15/20 07:08  59 26     60


 


9/15/20 07:00  48 26 140/66 (90) 98   


 


9/15/20 06:00  62 24 128/66 (86) 99   


 


9/15/20 05:00  75 24 110/72 (85) 99   


 


9/15/20 04:00      Mechanical Ventilator  





      Mechanical Ventilator  


 


9/15/20 04:00        60


 


9/15/20 04:00 98.5 106 32 119/101 (107) 100   


 


9/15/20 04:00  106      


 


9/15/20 03:40  68 26     60


 


9/15/20 03:00  60 23 125/74 (91) 99   


 


9/15/20 02:00  59 24 114/56 (75) 98   


 


9/15/20 01:00  60 26 124/54 (77) 99   


 


9/15/20 00:00        60


 


9/15/20 00:00      Mechanical Ventilator  





      Mechanical Ventilator  


 


9/15/20 00:00 98.6 59 21 124/82 (96) 99   


 


9/15/20 00:00  59      


 


9/14/20 23:40  57 26     60


 


9/14/20 23:40        60


 


9/14/20 23:00  57 22 116/60 (78) 100   


 


9/14/20 22:00  58 20 124/77 (93) 99   


 


9/14/20 21:00        70


 


9/14/20 21:00  55 26 122/69 (86) 100   


 


9/14/20 20:00 98.5 61 26 109/50 (69) 100   


 


9/14/20 20:00      Mechanical Ventilator  





      Mechanical Ventilator  


 


9/14/20 20:00        90


 


9/14/20 19:45  62      


 


9/14/20 19:30  64 26     70


 


9/14/20 19:00  58 22 108/73 (85) 100   


 


9/14/20 18:00  53 26 131/66 (87) 100   


 


9/14/20 17:00  65 25 117/57 (77) 100   


 


9/14/20 16:09 98.8 61 23 113/45 (67) 99   


 


9/14/20 16:00      Mechanical Ventilator  





      Mechanical Ventilator  


 


9/14/20 16:00        100


 


9/14/20 16:00  59      


 


9/14/20 15:03  50 26     80


 


9/14/20 15:00  56 26 93/61 (72) 100   


 


9/14/20 14:00  63 24 108/67 (81) 100   

















Intake and Output  


 


 9/14/20 9/15/20





 19:00 07:00


 


Intake Total 720 ml 1120 ml


 


Output Total 550 ml 690 ml


 


Balance 170 ml 430 ml


 


  


 


IV Total  200 ml


 


Tube Feeding 720 ml 720 ml


 


Other  200 ml


 


Output Urine Total 550 ml 590 ml


 


Stool Total  100 ml


 


# Bowel Movements 1 








Laboratory Tests


9/15/20 03:40: 


White Blood Count 16.3H, Red Blood Count 2.54L, Hemoglobin 8.4L, Hematocrit 

25.1L, Mean Corpuscular Volume 99, Mean Corpuscular Hemoglobin 33.1H, Mean 

Corpuscular Hemoglobin Concent 33.5, Red Cell Distribution Width 15.2H, 

Platelet Count 150, Mean Platelet Volume 9.2, Neutrophils (%) (Auto) , 

Lymphocytes (%) (Auto) , Monocytes (%) (Auto) , Eosinophils (%) (Auto) , 

Basophils (%) (Auto) , Differential Total Cells Counted 100, Neutrophils % (

Manual) 89H, Lymphocytes % (Manual) 4L, Monocytes % (Manual) 7, Eosinophils % (

Manual) 0, Basophils % (Manual) 0, Band Neutrophils 0, Platelet Estimate 

Adequate, Platelet Morphology Normal, Hypochromasia 1+, Anisocytosis 1+, Sodium 

Level 151H, Potassium Level 3.4L, Chloride Level 116H, Carbon Dioxide Level 28, 

Anion Gap 7, Blood Urea Nitrogen 31H, Creatinine 0.8, Estimat Glomerular 

Filtration Rate > 60, Glucose Level 197H, Calcium Level 7.6L, Phosphorus Level 

6.3H, Magnesium Level 1.8, Total Bilirubin 0.6, Aspartate Amino Transf (AST/SGOT

) 27, Alanine Aminotransferase (ALT/SGPT) 86H, Alkaline Phosphatase 54, Total 

Protein 4.8L, Albumin 1.3L, Globulin 3.5, Albumin/Globulin Ratio 0.4L


Height (Feet):  5


Height (Inches):  3.00


Weight (Pounds):  172


General Appearance:  no apparent distress


EENT:  other - On ventilator


Cardiovascular:  normal rate


Respiratory/Chest:  decreased breath sounds


Abdomen:  distended











Lam Nino MD Sep 15, 2020 13:29

## 2020-09-15 NOTE — GENERAL PROGRESS NOTE
Assessment/Plan


Assessment/Plan:


1. History of Down syndrome.


2. Dysphagia with G-tube.


3. Seizure disorder.


4. Hypothyroidism.


5. DAVID.


6. Pneumonia.


7. Sepsis.








fu H&H


ppi


GTF


hold GI procedures for now


check C.diff


stool ob + 1/2





Subjective


ROS Limited/Unobtainable:  No


Allergies:  


Coded Allergies:  


     No Known Allergies (Unverified , 1/8/19)





Objective





Last 24 Hour Vital Signs








  Date Time  Temp Pulse Resp B/P (MAP) Pulse Ox O2 Delivery O2 Flow Rate FiO2


 


9/15/20 07:08  59 26     60


 


9/15/20 07:00  48 26 140/66 (90) 98   


 


9/15/20 06:00  62 24 128/66 (86) 99   


 


9/15/20 05:00  75 24 110/72 (85) 99   


 


9/15/20 04:00      Mechanical Ventilator  





      Mechanical Ventilator  


 


9/15/20 04:00        60


 


9/15/20 04:00 98.5 106 32 119/101 (107) 100   


 


9/15/20 04:00  106      


 


9/15/20 03:40  68 26     60


 


9/15/20 03:00  60 23 125/74 (91) 99   


 


9/15/20 02:00  59 24 114/56 (75) 98   


 


9/15/20 01:00  60 26 124/54 (77) 99   


 


9/15/20 00:00        60


 


9/15/20 00:00      Mechanical Ventilator  





      Mechanical Ventilator  


 


9/15/20 00:00 98.6 59 21 124/82 (96) 99   


 


9/15/20 00:00  59      


 


9/14/20 23:40  57 26     60


 


9/14/20 23:40        60


 


9/14/20 23:00  57 22 116/60 (78) 100   


 


9/14/20 22:00  58 20 124/77 (93) 99   


 


9/14/20 21:00        70


 


9/14/20 21:00  55 26 122/69 (86) 100   


 


9/14/20 20:00 98.5 61 26 109/50 (69) 100   


 


9/14/20 20:00      Mechanical Ventilator  





      Mechanical Ventilator  


 


9/14/20 20:00        90


 


9/14/20 19:45  62      


 


9/14/20 19:30  64 26     70


 


9/14/20 19:00  58 22 108/73 (85) 100   


 


9/14/20 18:00  53 26 131/66 (87) 100   


 


9/14/20 17:00  65 25 117/57 (77) 100   


 


9/14/20 16:09 98.8 61 23 113/45 (67) 99   


 


9/14/20 16:00      Mechanical Ventilator  





      Mechanical Ventilator  


 


9/14/20 16:00        100


 


9/14/20 16:00  59      


 


9/14/20 15:03  50 26     80


 


9/14/20 15:00  56 26 93/61 (72) 100   


 


9/14/20 14:00  63 24 108/67 (81) 100   


 


9/14/20 13:00  55 26 110/78 (89) 100   


 


9/14/20 12:00      Mechanical Ventilator  





      Mechanical Ventilator  


 


9/14/20 12:00 98.4 59 26 120/87 (98) 100   


 


9/14/20 12:00  60      


 


9/14/20 12:00        100


 


9/14/20 11:00  55 26 111/70 (84) 100   


 


9/14/20 10:59  55 26     90


 


9/14/20 10:00  58 26 121/104 (110) 100   


 


9/14/20 09:00  67 26 128/68 (88) 100   


 


9/14/20 08:56        100


 


9/14/20 08:00 98.9 59 24 124/72 (89) 99   


 


9/14/20 08:00  65      


 


9/14/20 08:00      Mechanical Ventilator  





      Mechanical Ventilator  


 


9/14/20 08:00        100

















Intake and Output  


 


 9/14/20 9/15/20





 19:00 07:00


 


Intake Total 720 ml 1120 ml


 


Output Total 550 ml 690 ml


 


Balance 170 ml 430 ml


 


  


 


IV Total  200 ml


 


Tube Feeding 720 ml 720 ml


 


Other  200 ml


 


Output Urine Total 550 ml 590 ml


 


Stool Total  100 ml


 


# Bowel Movements 1 








Laboratory Tests


9/14/20 08:04: 


Arterial Blood pH 7.425, Arterial Blood Partial Pressure CO2 41.3, Arterial 

Blood Partial Pressure O2 52.7L, Arterial Blood HCO3 26.5H, Arterial Blood 

Oxygen Saturation 84.7*L, Arterial Blood Base Excess 1.9, Cole Test Positive


9/15/20 03:40: 


White Blood Count 16.3H, Red Blood Count 2.54L, Hemoglobin 8.4L, Hematocrit 

25.1L, Mean Corpuscular Volume 99, Mean Corpuscular Hemoglobin 33.1H, Mean 

Corpuscular Hemoglobin Concent 33.5, Red Cell Distribution Width 15.2H, 

Platelet Count 150, Mean Platelet Volume 9.2, Neutrophils (%) (Auto) , 

Lymphocytes (%) (Auto) , Monocytes (%) (Auto) , Eosinophils (%) (Auto) , 

Basophils (%) (Auto) , Neutrophils % (Manual) [Pending], Lymphocytes % (Manual) 

[Pending], Platelet Estimate [Pending], Platelet Morphology [Pending], Sodium 

Level 151H, Potassium Level 3.4L, Chloride Level 116H, Carbon Dioxide Level 28, 

Anion Gap 7, Blood Urea Nitrogen 31H, Creatinine 0.8, Estimat Glomerular 

Filtration Rate > 60, Glucose Level 197H, Calcium Level 7.6L, Phosphorus Level 

6.3H, Magnesium Level 1.8, Total Bilirubin 0.6, Aspartate Amino Transf (AST/SGOT

) 27, Alanine Aminotransferase (ALT/SGPT) 86H, Alkaline Phosphatase 54, Total 

Protein 4.8L, Albumin 1.3L, Globulin 3.5, Albumin/Globulin Ratio 0.4L


Height (Feet):  5


Height (Inches):  3.00


Weight (Pounds):  172


General Appearance:  lethargic


EENT:  normal ENT inspection


Neck:  supple


Cardiovascular:  normal rate


Respiratory/Chest:  decreased breath sounds


Abdomen:  normal bowel sounds, non tender, soft


Extremities:  non-tender











Ted Nagy MD Sep 15, 2020 07:30

## 2020-09-15 NOTE — PULMONOLGY CRITICAL CARE NOTE
Critical Care - Asmt/Plan


Problems:  


(1) Acute respiratory failure


(2) Bacteremia


(3) Pneumonia


(4) Sepsis


(5) HCAP (healthcare-associated pneumonia)


(6) Seizure disorder


(7) Down's syndrome


Respiratory:  monitor respiratory rate, adjust FIO2, CXR


Cardiac:  continue pressors, continue to monitor HR/BP


Renal:  F/U I&O, keep IV fluid, check electrolytes


Infectious Disease:  check cultures, continue antibiotics


Gastrointestinal:  continue feedings/current rate


Endocrine:  monitor blood sugar, continue sliding scale insulin


Hematologic:  monitor H/H, transfuse if hgb<8.5


Neurologic:  PRN Ativan, keep patient comfortable


Time Spent (Minutes):  40


Notes Reviewed:  internist, cardio, renal


Discussed with:  nurses, consultants, 





Critical Care - Objective





Last 24 Hour Vital Signs








  Date Time  Temp Pulse Resp B/P (MAP) Pulse Ox O2 Delivery O2 Flow Rate FiO2


 


9/15/20 11:00  61 23 137/62 (87) 90   


 


9/15/20 10:50    160/99    


 


9/15/20 10:00  52 26 160/99 (119) 93   


 


9/15/20 09:00  57 23 138/118 (125) 96   


 


9/15/20 08:00 98.8 55 24 141/75 (97) 98   


 


9/15/20 08:00        60


 


9/15/20 08:00      Mechanical Ventilator  





      Mechanical Ventilator  


 


9/15/20 07:47  57      


 


9/15/20 07:45    140/66    


 


9/15/20 07:08  59 26     60


 


9/15/20 07:00  48 26 140/66 (90) 98   


 


9/15/20 06:00  62 24 128/66 (86) 99   


 


9/15/20 05:00  75 24 110/72 (85) 99   


 


9/15/20 04:00      Mechanical Ventilator  





      Mechanical Ventilator  


 


9/15/20 04:00        60


 


9/15/20 04:00 98.5 106 32 119/101 (107) 100   


 


9/15/20 04:00  106      


 


9/15/20 03:40  68 26     60


 


9/15/20 03:00  60 23 125/74 (91) 99   


 


9/15/20 02:00  59 24 114/56 (75) 98   


 


9/15/20 01:00  60 26 124/54 (77) 99   


 


9/15/20 00:00        60


 


9/15/20 00:00      Mechanical Ventilator  





      Mechanical Ventilator  


 


9/15/20 00:00 98.6 59 21 124/82 (96) 99   


 


9/15/20 00:00  59      


 


9/14/20 23:40  57 26     60


 


9/14/20 23:40        60


 


9/14/20 23:00  57 22 116/60 (78) 100   


 


9/14/20 22:00  58 20 124/77 (93) 99   


 


9/14/20 21:00        70


 


9/14/20 21:00  55 26 122/69 (86) 100   


 


9/14/20 20:00 98.5 61 26 109/50 (69) 100   


 


9/14/20 20:00      Mechanical Ventilator  





      Mechanical Ventilator  


 


9/14/20 20:00        90


 


9/14/20 19:45  62      


 


9/14/20 19:30  64 26     70


 


9/14/20 19:00  58 22 108/73 (85) 100   


 


9/14/20 18:00  53 26 131/66 (87) 100   


 


9/14/20 17:00  65 25 117/57 (77) 100   


 


9/14/20 16:09 98.8 61 23 113/45 (67) 99   


 


9/14/20 16:00      Mechanical Ventilator  





      Mechanical Ventilator  


 


9/14/20 16:00        100


 


9/14/20 16:00  59      


 


9/14/20 15:03  50 26     80


 


9/14/20 15:00  56 26 93/61 (72) 100   


 


9/14/20 14:00  63 24 108/67 (81) 100   


 


9/14/20 13:00  55 26 110/78 (89) 100   


 


9/14/20 12:00      Mechanical Ventilator  





      Mechanical Ventilator  


 


9/14/20 12:00 98.4 59 26 120/87 (98) 100   


 


9/14/20 12:00  60      


 


9/14/20 12:00        100








Status:  awake


Condition:  critical


HEENT:  atraumatic, normocephalic


Lungs:  rales, rhonchi


Heart:  HR/BP stable


Abdomen:  soft, non-tender


Extremities:  no C/C/E


Decubiti:  location


Micro:





Microbiology








 Date/Time


Source Procedure


Growth Status





 


 9/14/20 18:05


Stool Clostridium difficile Toxin Assay - Final Complete








Accucheck:  131





Critical Care - Subjective


ROS Limited/Unobtainable:  Yes


Condition:  critical


EKG Rhythm:  Sinus Rhythm


FI02:  60


Vent Support Breath Rate:  26


Vent Support Mode:  AC


Vent Tidal Volume:  500


Sputum Amount:  Moderate


PEEP:  10.0


PIP:  46


Tube Feeding Amount:  60


I&O:











Intake and Output  


 


 9/14/20 9/15/20





 19:00 07:00


 


Intake Total 720 ml 1120 ml


 


Output Total 550 ml 690 ml


 


Balance 170 ml 430 ml


 


  


 


IV Total  200 ml


 


Tube Feeding 720 ml 720 ml


 


Other  200 ml


 


Output Urine Total 550 ml 590 ml


 


Stool Total  100 ml


 


# Bowel Movements 1 








CXR:


ET in good position


ET-Tube:  7.5


ET Position:  22


Labs:





Laboratory Tests








Test


 9/15/20


03:40


 


White Blood Count


 16.3 K/UL


(4.8-10.8)  H


 


Red Blood Count


 2.54 M/UL


(4.20-5.40)  L


 


Hemoglobin


 8.4 G/DL


(12.0-16.0)  L


 


Hematocrit


 25.1 %


(37.0-47.0)  L


 


Mean Corpuscular Volume 99 FL (80-99)  


 


Mean Corpuscular Hemoglobin


 33.1 PG


(27.0-31.0)  H


 


Mean Corpuscular Hemoglobin


Concent 33.5 G/DL


(32.0-36.0)


 


Red Cell Distribution Width


 15.2 %


(11.6-14.8)  H


 


Platelet Count


 150 K/UL


(150-450)


 


Mean Platelet Volume


 9.2 FL


(6.5-10.1)


 


Neutrophils (%) (Auto)


 % (45.0-75.0)





 


Lymphocytes (%) (Auto)


 % (20.0-45.0)





 


Monocytes (%) (Auto)  % (1.0-10.0)  


 


Eosinophils (%) (Auto)  % (0.0-3.0)  


 


Basophils (%) (Auto)  % (0.0-2.0)  


 


Differential Total Cells


Counted 100  





 


Neutrophils % (Manual) 89 % (45-75)  H


 


Lymphocytes % (Manual) 4 % (20-45)  L


 


Monocytes % (Manual) 7 % (1-10)  


 


Eosinophils % (Manual) 0 % (0-3)  


 


Basophils % (Manual) 0 % (0-2)  


 


Band Neutrophils 0 % (0-8)  


 


Platelet Estimate Adequate  


 


Platelet Morphology Normal  


 


Hypochromasia 1+  


 


Anisocytosis 1+  


 


Sodium Level


 151 MMOL/L


(136-145)  H


 


Potassium Level


 3.4 MMOL/L


(3.5-5.1)  L


 


Chloride Level


 116 MMOL/L


()  H


 


Carbon Dioxide Level


 28 MMOL/L


(21-32)


 


Anion Gap


 7 mmol/L


(5-15)


 


Blood Urea Nitrogen


 31 mg/dL


(7-18)  H


 


Creatinine


 0.8 MG/DL


(0.55-1.30)


 


Estimat Glomerular Filtration


Rate > 60 mL/min


(>60)


 


Glucose Level


 197 MG/DL


()  H


 


Calcium Level


 7.6 MG/DL


(8.5-10.1)  L


 


Phosphorus Level


 6.3 MG/DL


(2.5-4.9)  H


 


Magnesium Level


 1.8 MG/DL


(1.8-2.4)


 


Total Bilirubin


 0.6 MG/DL


(0.2-1.0)


 


Aspartate Amino Transf


(AST/SGOT) 27 U/L (15-37)





 


Alanine Aminotransferase


(ALT/SGPT) 86 U/L (12-78)


H


 


Alkaline Phosphatase


 54 U/L


()


 


Total Protein


 4.8 G/DL


(6.4-8.2)  L


 


Albumin


 1.3 G/DL


(3.4-5.0)  L


 


Globulin 3.5 g/dL  


 


Albumin/Globulin Ratio


 0.4 (1.0-2.7)


L

















Chano Cherry MD Sep 15, 2020 11:24

## 2020-09-15 NOTE — NUR
NURSE NOTES:

MORNING CARE AND ORAL CARE WAS DONE, RECTAL TUBE INTACT AND PATENT, WILL CONTINUE PLAN OF 
CARE.

## 2020-09-15 NOTE — INFECTIOUS DISEASES PROG NOTE
Assessment/Plan








ASSESSMENT:


sp code blue 8/26





Septic Shock; SP


Fever, recurrent


Leukocytosis; persistent/fluctuating


   -9/9 u/a no pyuria


   -9/8 Bcx NTD (Picc line)


   -9/6 Bcx NTD


            ucx Neg


             sp cx C. parapsilopsis


  -8/26 u/a no pyuria





Pneumonia.- COVID 19 neg x3


   Acute hypoxic resp failure on VM> NRB 15l 100%; hypoxic on ABG> now VDRF 8/26

- Fio2 80% >100% 8/28> 60% 9/1 >80% 9/2   >95% 9/10 >90% 9/14 >60% 9/15


      -9/14 CXR: Improved aeration of both lungs.


       -9/5 CXR:Small bilateral pleural effusions with minor edema.  Stable 

edema with  mild worsening in the degree of pleural effusion on the right.


         -9/1 sp cx Neg


         8/31 CXR: Extensive bilateral interstitial and airspace disease 

appears similar to the prior exam. Moderate to large bilateral pleural 

effusions appear unchanged.


   8/28 CXR: Increasing left upper lobe dense consolidation and likely 

increasing bilateral pleural fluid. Persistent diffuse dense consolidation 

elsewhere


            -8/26 sp cx normal resp lilliam


             -8/25 CXR: Increased atelectasis of the right lung, since prior 

exam of 3 days earlier. New or increased right pleural effusion. Increased left 

basilar consolidation and/or pleural fluid 


          -COVID Rapid PCR neg 8/23, 8/23, 8/26


          -8/22 spc x Group G strep


          -8/22 CXR:   Reduced lung volumes.  Patchy bilateral predominantly 

interstitial  pulmonary opacities.  Could be from edema and/or pneumonia.  

There is a broader differential.


 


        -legionella ag urine, blasto ab, Histo ab, HIV ab screen, JOY, ANCA neg





Persistent, high grade bacteremia-


     -8/22 Bcx 4/4 sets S. haemolyticus; 8/23 Bcx 3/4 S/ epi; 8/27 Bcx 1.4 S. 

warnerri; 8/29 Bcx Neg


     -2d echo: no vegetaions seen





          ua/ wbc 10-15, nit neg, leuk +1; ucx Neg





DAVID; 


 -supratherapeutic vanco levels





-Seizure disorder.


- Hypothyroidism.


- Down syndrome.





History of PEG tube placement.


[facility]





PLAN:





Continue to monitor off abx


          9/14 SP Meropenem #18, IV AMikacin #7


          9/4/20 SP Daptomycin #11


          9/2 SP MIcafungin #7, Linezolid #5


   8/28 SP Azithromycin #7/7


   8/26 SP Ceftriaxone #2


   8/25 SP IV Vancomycin #4, Zosyn #4


   8/22 SP Cefepime x1, Flagyl x1





- Monitor CBC, BMP.


.f/u  Repeat cx


- COVID neg x3


- Monitor chest x-ray.


- Monitor the patient's clinical course and labs.  Based on those, we will do 

further recommendation.


-f/u Bcx from picc line, cdiff if diarrhea


-f/u Fungitell, asp ag, flow cytometry


-Once stable, CT chest/abd/pw/ given persistent leukocytosis





Thank you, Dr. Cherry, for allowing me to participate in the care of


this patient.  I will follow the patient with you at this


hospitalization.








Discussed with RN





Subjective


Allergies:  


Coded Allergies:  


     No Known Allergies (Unverified , 1/8/19)


afebrile


wbc increased


now off abx


fio2 down to 60%


cdif neg





Objective





Last 24 Hour Vital Signs








  Date Time  Temp Pulse Resp B/P (MAP) Pulse Ox O2 Delivery O2 Flow Rate FiO2


 


9/15/20 14:00  47 26 107/57 (74) 96   


 


9/15/20 13:00  62 22 111/59 (76) 95   


 


9/15/20 12:31  70 26     60


 


9/15/20 12:00 98.4 62 18 115/65 (82) 96   


 


9/15/20 12:00        60


 


9/15/20 12:00      Mechanical Ventilator  





      Mechanical Ventilator  


 


9/15/20 12:00  62 18 115/65 (82) 96   


 


9/15/20 12:00  63      


 


9/15/20 11:00  61 23 137/62 (87) 90   


 


9/15/20 10:50    160/99    


 


9/15/20 10:00  52 26 160/99 (119) 93   


 


9/15/20 09:00  57 23 138/118 (125) 96   


 


9/15/20 08:00 98.8 55 24 141/75 (97) 98   


 


9/15/20 08:00        60


 


9/15/20 08:00      Mechanical Ventilator  





      Mechanical Ventilator  


 


9/15/20 07:47  57      


 


9/15/20 07:45    140/66    


 


9/15/20 07:08  59 26     60


 


9/15/20 07:00  48 26 140/66 (90) 98   


 


9/15/20 06:00  62 24 128/66 (86) 99   


 


9/15/20 05:00  75 24 110/72 (85) 99   


 


9/15/20 04:00      Mechanical Ventilator  





      Mechanical Ventilator  


 


9/15/20 04:00        60


 


9/15/20 04:00 98.5 106 32 119/101 (107) 100   


 


9/15/20 04:00  106      


 


9/15/20 03:40  68 26     60


 


9/15/20 03:00  60 23 125/74 (91) 99   


 


9/15/20 02:00  59 24 114/56 (75) 98   


 


9/15/20 01:00  60 26 124/54 (77) 99   


 


9/15/20 00:00        60


 


9/15/20 00:00      Mechanical Ventilator  





      Mechanical Ventilator  


 


9/15/20 00:00 98.6 59 21 124/82 (96) 99   


 


9/15/20 00:00  59      


 


9/14/20 23:40  57 26     60


 


9/14/20 23:40        60


 


9/14/20 23:00  57 22 116/60 (78) 100   


 


9/14/20 22:00  58 20 124/77 (93) 99   


 


9/14/20 21:00        70


 


9/14/20 21:00  55 26 122/69 (86) 100   


 


9/14/20 20:00 98.5 61 26 109/50 (69) 100   


 


9/14/20 20:00      Mechanical Ventilator  





      Mechanical Ventilator  


 


9/14/20 20:00        90


 


9/14/20 19:45  62      


 


9/14/20 19:30  64 26     70


 


9/14/20 19:00  58 22 108/73 (85) 100   


 


9/14/20 18:00  53 26 131/66 (87) 100   


 


9/14/20 17:00  65 25 117/57 (77) 100   


 


9/14/20 16:09 98.8 61 23 113/45 (67) 99   


 


9/14/20 16:00      Mechanical Ventilator  





      Mechanical Ventilator  


 


9/14/20 16:00        100


 


9/14/20 16:00  59      


 


9/14/20 15:03  50 26     80


 


9/14/20 15:00  56 26 93/61 (72) 100   








Height (Feet):  5


Height (Inches):  3.00


Weight (Pounds):  172


HEENT:  No pale conjunctivae.  No icterus. ETT in place


NECK:  No lymphadenopathy.


CHEST:  Coarse breathing sounds.


HEART:  S1 and S2.


ABDOMEN:  Soft.  PEG tube in place.


EXTREMITIES:  No cyanosis at this time .


SKIN: no rash





Microbiology








 Date/Time


Source Procedure


Growth Status





 


 9/14/20 18:05


Stool Clostridium difficile Toxin Assay - Final Complete











Laboratory Tests








Test


 9/15/20


03:40


 


White Blood Count


 16.3 K/UL


(4.8-10.8)  H


 


Red Blood Count


 2.54 M/UL


(4.20-5.40)  L


 


Hemoglobin


 8.4 G/DL


(12.0-16.0)  L


 


Hematocrit


 25.1 %


(37.0-47.0)  L


 


Mean Corpuscular Volume 99 FL (80-99)  


 


Mean Corpuscular Hemoglobin


 33.1 PG


(27.0-31.0)  H


 


Mean Corpuscular Hemoglobin


Concent 33.5 G/DL


(32.0-36.0)


 


Red Cell Distribution Width


 15.2 %


(11.6-14.8)  H


 


Platelet Count


 150 K/UL


(150-450)


 


Mean Platelet Volume


 9.2 FL


(6.5-10.1)


 


Neutrophils (%) (Auto)


 % (45.0-75.0)





 


Lymphocytes (%) (Auto)


 % (20.0-45.0)





 


Monocytes (%) (Auto)  % (1.0-10.0)  


 


Eosinophils (%) (Auto)  % (0.0-3.0)  


 


Basophils (%) (Auto)  % (0.0-2.0)  


 


Differential Total Cells


Counted 100  





 


Neutrophils % (Manual) 89 % (45-75)  H


 


Lymphocytes % (Manual) 4 % (20-45)  L


 


Monocytes % (Manual) 7 % (1-10)  


 


Eosinophils % (Manual) 0 % (0-3)  


 


Basophils % (Manual) 0 % (0-2)  


 


Band Neutrophils 0 % (0-8)  


 


Platelet Estimate Adequate  


 


Platelet Morphology Normal  


 


Hypochromasia 1+  


 


Anisocytosis 1+  


 


Sodium Level


 151 MMOL/L


(136-145)  H


 


Potassium Level


 3.4 MMOL/L


(3.5-5.1)  L


 


Chloride Level


 116 MMOL/L


()  H


 


Carbon Dioxide Level


 28 MMOL/L


(21-32)


 


Anion Gap


 7 mmol/L


(5-15)


 


Blood Urea Nitrogen


 31 mg/dL


(7-18)  H


 


Creatinine


 0.8 MG/DL


(0.55-1.30)


 


Estimat Glomerular Filtration


Rate > 60 mL/min


(>60)


 


Glucose Level


 197 MG/DL


()  H


 


Calcium Level


 7.6 MG/DL


(8.5-10.1)  L


 


Phosphorus Level


 6.3 MG/DL


(2.5-4.9)  H


 


Magnesium Level


 1.8 MG/DL


(1.8-2.4)


 


Total Bilirubin


 0.6 MG/DL


(0.2-1.0)


 


Aspartate Amino Transf


(AST/SGOT) 27 U/L (15-37)





 


Alanine Aminotransferase


(ALT/SGPT) 86 U/L (12-78)


H


 


Alkaline Phosphatase


 54 U/L


()


 


Total Protein


 4.8 G/DL


(6.4-8.2)  L


 


Albumin


 1.3 G/DL


(3.4-5.0)  L


 


Globulin 3.5 g/dL  


 


Albumin/Globulin Ratio


 0.4 (1.0-2.7)


L











Current Medications








 Medications


  (Trade)  Dose


 Ordered  Sig/Didi


 Route


 PRN Reason  Start Time


 Stop Time Status Last Admin


Dose Admin


 


 Acetaminophen


  (Tylenol)  650 mg  Q4H  PRN


 GT


 FEVER  8/26/20 11:45


 9/25/20 11:44  9/7/20 03:17





 


 Chlorhexidine


 Gluconate


  (Beatrice-Hex 2%)  1 applic  DAILY@2000


 TOPIC


   8/31/20 20:00


 11/29/20 19:59  9/14/20 20:27





 


 Clotrimazole


  (Lotrimin)  1 applic  Q12HR


 TOPIC


   8/30/20 13:00


 11/28/20 12:59  9/15/20 08:37





 


 Dextrose  500 ml @ 


 500 mls/hr  ONCE  ONCE


 IV


   9/15/20 17:00


 9/15/20 17:59   





 


 Dextrose


  (Dextrose 50%)  25 ml  Q30M  PRN


 IV


 Hypoglycemia  8/26/20 11:30


 11/20/20 11:29   





 


 Dextrose


  (Dextrose 50%)  50 ml  Q30M  PRN


 IV


 Hypoglycemia  8/26/20 11:30


 11/20/20 11:29   





 


 Dopamine HCl/


 Dextrose  250 ml @ 0


 mls/hr  Q24H


 IV


   9/4/20 11:15


 12/3/20 11:14  9/6/20 19:47





 


 Hydrocortisone


  (Solu-CORTEF)  100 mg  EVERY 8  HOURS


 IV


   8/30/20 14:00


 11/28/20 13:59  9/15/20 13:28





 


 Levothyroxine


 Sodium


  (Synthroid)  75 mcg  DAILY


 GT


   8/27/20 09:00


 9/22/20 08:59  9/15/20 08:36





 


 Midodrine


  (Pro-Amatine)  10 mg  Q8HR


 GT


   8/28/20 14:00


 11/26/20 13:59  9/15/20 13:28





 


 Norepinephrine


 Bitartrate 16 mg/


 Dextrose  500 ml @ 0


 mls/hr  Q24H


 IV


   8/31/20 07:45


 9/29/20 12:59  9/4/20 08:43





 


 Ondansetron HCl


  (Zofran)  4 mg  Q6H  PRN


 IVP


 Nausea & Vomiting  8/26/20 12:00


 9/21/20 11:59   





 


 Pantoprazole


  (Protonix)  40 mg  DAILY


 IV


   8/27/20 09:00


 9/22/20 08:59  9/15/20 08:36





 


 Phenylephrine HCl


 50 mg/Dextrose  250 ml @ 0


 mls/hr  Q24H  PRN


 IV


 For hypotension  8/29/20 17:45


 9/28/20 17:44  8/31/20 01:49





 


 Polyethylene


 Glycol


  (Miralax)  17 gm  DAILYPRN  PRN


 GT


 Constipation  8/26/20 12:00


 9/21/20 11:59   





 


 Potassium Chloride  100 ml @ 


 50 mls/hr  Q2H


 IVPB


   9/15/20 11:00


 9/15/20 14:59  9/15/20 13:28





 


 Valproic Acid


  (Depakene)  500 mg  EVERY 8  HOURS


 GT


   8/26/20 14:00


 9/21/20 21:59  9/15/20 13:28




















Rema Gomes M.D. Sep 15, 2020 15:01

## 2020-09-15 NOTE — INTERNAL MED PROGRESS NOTE
Subjective


Date of Service:  Sep 15, 2020


Physician Name


Sanya Schultz


Attending Physician


Chano Cherry MD





Current Medications








 Medications


  (Trade)  Dose


 Ordered  Sig/Didi


 Route


 PRN Reason  Start Time


 Stop Time Status Last Admin


Dose Admin


 


 Acetaminophen


  (Tylenol)  650 mg  Q4H  PRN


 GT


 FEVER  8/26/20 11:45


 9/25/20 11:44  9/7/20 03:17


 


 


 Chlorhexidine


 Gluconate


  (Beatrice-Hex 2%)  1 applic  DAILY@2000


 TOPIC


   8/31/20 20:00


 11/29/20 19:59  9/14/20 20:27


 


 


 Clotrimazole


  (Lotrimin)  1 applic  Q12HR


 TOPIC


   8/30/20 13:00


 11/28/20 12:59  9/15/20 08:37


 


 


 Dextrose  500 ml @ 


 500 mls/hr  ONCE  ONCE


 IV


   9/15/20 17:00


 9/15/20 17:59   


 


 


 Dextrose


  (Dextrose 50%)  25 ml  Q30M  PRN


 IV


 Hypoglycemia  8/26/20 11:30


 11/20/20 11:29   


 


 


 Dextrose


  (Dextrose 50%)  50 ml  Q30M  PRN


 IV


 Hypoglycemia  8/26/20 11:30


 11/20/20 11:29   


 


 


 Dopamine HCl/


 Dextrose  250 ml @ 0


 mls/hr  Q24H


 IV


   9/4/20 11:15


 12/3/20 11:14  9/6/20 19:47


 


 


 Hydrocortisone


  (Solu-CORTEF)  100 mg  EVERY 8  HOURS


 IV


   8/30/20 14:00


 11/28/20 13:59  9/15/20 13:28


 


 


 Levothyroxine


 Sodium


  (Synthroid)  75 mcg  DAILY


 GT


   8/27/20 09:00


 9/22/20 08:59  9/15/20 08:36


 


 


 Midodrine


  (Pro-Amatine)  10 mg  Q8HR


 GT


   8/28/20 14:00


 11/26/20 13:59  9/15/20 13:28


 


 


 Norepinephrine


 Bitartrate 16 mg/


 Dextrose  500 ml @ 0


 mls/hr  Q24H


 IV


   8/31/20 07:45


 9/29/20 12:59  9/4/20 08:43


 


 


 Ondansetron HCl


  (Zofran)  4 mg  Q6H  PRN


 IVP


 Nausea & Vomiting  8/26/20 12:00


 9/21/20 11:59   


 


 


 Pantoprazole


  (Protonix)  40 mg  DAILY


 IV


   8/27/20 09:00


 9/22/20 08:59  9/15/20 08:36


 


 


 Phenylephrine HCl


 50 mg/Dextrose  250 ml @ 0


 mls/hr  Q24H  PRN


 IV


 For hypotension  8/29/20 17:45


 9/28/20 17:44  8/31/20 01:49


 


 


 Polyethylene


 Glycol


  (Miralax)  17 gm  DAILYPRN  PRN


 GT


 Constipation  8/26/20 12:00


 9/21/20 11:59   


 


 


 Valproic Acid


  (Depakene)  500 mg  EVERY 8  HOURS


 GT


   8/26/20 14:00


 9/21/20 21:59  9/15/20 13:28


 








Allergies:  


Coded Allergies:  


     No Known Allergies (Unverified , 1/8/19)


ROS Limited/Unobtainable:  Yes


Subjective


60 YO F with Down's syndrome admitted with hypoxia.  Now sepsis and pneumonia.  

Cover for Int Med-DR Hung.  ICU.  Intubated and sedated





Objective





Last Vital Signs








  Date Time  Temp Pulse Resp B/P (MAP) Pulse Ox O2 Delivery O2 Flow Rate FiO2


 


9/15/20 15:29  45 26     100


 


9/15/20 15:00    132/90 (104) 97   


 


9/15/20 12:00 98.4       


 


9/15/20 12:00      Mechanical Ventilator  





      Mechanical Ventilator  











Laboratory Tests








Test


  9/15/20


03:40


 


White Blood Count


  16.3 K/UL


(4.8-10.8)  H


 


Red Blood Count


  2.54 M/UL


(4.20-5.40)  L


 


Hemoglobin


  8.4 G/DL


(12.0-16.0)  L


 


Hematocrit


  25.1 %


(37.0-47.0)  L


 


Mean Corpuscular Volume 99 FL (80-99)  


 


Mean Corpuscular Hemoglobin


  33.1 PG


(27.0-31.0)  H


 


Mean Corpuscular Hemoglobin


Concent 33.5 G/DL


(32.0-36.0)


 


Red Cell Distribution Width


  15.2 %


(11.6-14.8)  H


 


Platelet Count


  150 K/UL


(150-450)


 


Mean Platelet Volume


  9.2 FL


(6.5-10.1)


 


Neutrophils (%) (Auto)


  % (45.0-75.0)


 


 


Lymphocytes (%) (Auto)


  % (20.0-45.0)


 


 


Monocytes (%) (Auto)  % (1.0-10.0)  


 


Eosinophils (%) (Auto)  % (0.0-3.0)  


 


Basophils (%) (Auto)  % (0.0-2.0)  


 


Differential Total Cells


Counted 100  


 


 


Neutrophils % (Manual) 89 % (45-75)  H


 


Lymphocytes % (Manual) 4 % (20-45)  L


 


Monocytes % (Manual) 7 % (1-10)  


 


Eosinophils % (Manual) 0 % (0-3)  


 


Basophils % (Manual) 0 % (0-2)  


 


Band Neutrophils 0 % (0-8)  


 


Platelet Estimate Adequate  


 


Platelet Morphology Normal  


 


Hypochromasia 1+  


 


Anisocytosis 1+  


 


Sodium Level


  151 MMOL/L


(136-145)  H


 


Potassium Level


  3.4 MMOL/L


(3.5-5.1)  L


 


Chloride Level


  116 MMOL/L


()  H


 


Carbon Dioxide Level


  28 MMOL/L


(21-32)


 


Anion Gap


  7 mmol/L


(5-15)


 


Blood Urea Nitrogen


  31 mg/dL


(7-18)  H


 


Creatinine


  0.8 MG/DL


(0.55-1.30)


 


Estimat Glomerular Filtration


Rate > 60 mL/min


(>60)


 


Glucose Level


  197 MG/DL


()  H


 


Calcium Level


  7.6 MG/DL


(8.5-10.1)  L


 


Phosphorus Level


  6.3 MG/DL


(2.5-4.9)  H


 


Magnesium Level


  1.8 MG/DL


(1.8-2.4)


 


Total Bilirubin


  0.6 MG/DL


(0.2-1.0)


 


Aspartate Amino Transf


(AST/SGOT) 27 U/L (15-37)


 


 


Alanine Aminotransferase


(ALT/SGPT) 86 U/L (12-78)


H


 


Alkaline Phosphatase


  54 U/L


()


 


Total Protein


  4.8 G/DL


(6.4-8.2)  L


 


Albumin


  1.3 G/DL


(3.4-5.0)  L


 


Globulin 3.5 g/dL  


 


Albumin/Globulin Ratio


  0.4 (1.0-2.7)


L











Microbiology








 Date/Time


Source Procedure


Growth Status


 


 


 9/14/20 18:05


Stool Clostridium difficile Toxin Assay - Final Complete

















Intake and Output  


 


 9/14/20 9/15/20





 19:00 07:00


 


Intake Total 720 ml 1120 ml


 


Output Total 550 ml 690 ml


 


Balance 170 ml 430 ml


 


  


 


IV Total  200 ml


 


Tube Feeding 720 ml 720 ml


 


Other  200 ml


 


Output Urine Total 550 ml 590 ml


 


Stool Total  100 ml


 


# Bowel Movements 1 








Objective


General Appearance:  WD/WN, no apparent distress, alert


EENT:  PERRL/EOMI, normal ENT inspection


Neck:  non-tender, normal alignment, supple, normal inspection


Cardiovascular:  normal peripheral pulses, normal rate, regular rhythm, no 

gallop/murmur, no JVD


Respiratory/Chest:  Mech vent; decreased breath sounds, crackles/rales, rhonchi 

- bilaterally, expiratory wheezing


Abdomen:  normal bowel sounds, non tender, soft, no organomegaly, no mass


Extremities:  normal range of motion


Neurologic:  CNs II-XII grossly normal


Skin:  normal pigmentation, warm/dry





Assessment/Plan


Problem List:  


(1) HCAP (healthcare-associated pneumonia)


Assessment & Plan:  Strep Group G.  Continue amikacin per ID=Dr Gomes.  

Pulmonary/Critical care=DR Cherry.  COVID NEG





(2) Sepsis


Assessment & Plan:  Staph haemolyticus.  Continue amikacin per ID=Dr Gomes





(3) Down's syndrome


(4) Dysphagia


Assessment & Plan:  S/P PEG





(5) Seizure disorder


Assessment & Plan:  Continue keppra and depakote





(6) Hypothyroidism


Assessment & Plan:  Continue synthroid





(7) Acute respiratory failure


Assessment & Plan:  Pulmonary = Dr Cherry; [facility] vent














Sanya Schultz MD Sep 15, 2020 16:37

## 2020-09-15 NOTE — NUR
NURSE NOTES:

Received patient from Nirav RN. Patient is awake, alert and oriented x2. Sinus Rhythm on the 
heart monitor, HR 68. Receiving oxygen via ET Tube 7.5 22cm at the lip line, vent settings: 
AC 26, , FiO2 60%, PEEP 10. IV site is Left Upper Arm PICC patent and intact. G-tube 
is intact and receiving Vital AF at 60cc/hr. Valle catheter is intact and draining, rectal 
tube is also intact and draining. Bed is locked, placed in lowest position, side rails up 
x3, bed alarm on, head of bed elevated, call light within reach. Will continue to monitor.

## 2020-09-15 NOTE — CARDIOLOGY PROGRESS NOTE
Assessment/Plan


Assessment/Plan


sepsis


respiratory failure 


ards 


renal insuf 


bacteremia


abn cardiac enzyme due to demand 


sinus arnold stable 


thrombocytopenia resolved  


hypernatremia 











vent support 


abx 


wbc higher 


na increased still 


3rd spacing alot 


cxr from 9/14 appears improved to my reading and with radiologsit reading as 

well 


tsh seems fine 


sig edema with hypernatremia need nauteresis eventually 


remains off pressor still at this time 


hr inthe 40's-60's no sig pauses on tele slower heart rate occure during sleep 

per rn 


tele personally reviewed 


hopefully will be able to wean soon





Subjective


ROS Limited/Unobtainable:  Yes


Subjective


on  a vent not communicative but look awake





Objective





Last 24 Hour Vital Signs








  Date Time  Temp Pulse Resp B/P (MAP) Pulse Ox O2 Delivery O2 Flow Rate FiO2


 


9/15/20 15:29  45 26     100


 


9/15/20 15:00  54 26 132/90 (104) 97   


 


9/15/20 14:00  47 26 107/57 (74) 96   


 


9/15/20 13:00  62 22 111/59 (76) 95   


 


9/15/20 12:31  70 26     60


 


9/15/20 12:00 98.4 62 18 115/65 (82) 96   


 


9/15/20 12:00        60


 


9/15/20 12:00      Mechanical Ventilator  





      Mechanical Ventilator  


 


9/15/20 12:00  62 18 115/65 (82) 96   


 


9/15/20 12:00  63      


 


9/15/20 11:00  61 23 137/62 (87) 90   


 


9/15/20 10:50    160/99    


 


9/15/20 10:00  52 26 160/99 (119) 93   


 


9/15/20 09:00  57 23 138/118 (125) 96   


 


9/15/20 08:00 98.8 55 24 141/75 (97) 98   


 


9/15/20 08:00        60


 


9/15/20 08:00      Mechanical Ventilator  





      Mechanical Ventilator  


 


9/15/20 07:47  57      


 


9/15/20 07:45    140/66    


 


9/15/20 07:08  59 26     60


 


9/15/20 07:00  48 26 140/66 (90) 98   


 


9/15/20 06:00  62 24 128/66 (86) 99   


 


9/15/20 05:00  75 24 110/72 (85) 99   


 


9/15/20 04:00      Mechanical Ventilator  





      Mechanical Ventilator  


 


9/15/20 04:00        60


 


9/15/20 04:00 98.5 106 32 119/101 (107) 100   


 


9/15/20 04:00  106      


 


9/15/20 03:40  68 26     60


 


9/15/20 03:00  60 23 125/74 (91) 99   


 


9/15/20 02:00  59 24 114/56 (75) 98   


 


9/15/20 01:00  60 26 124/54 (77) 99   


 


9/15/20 00:00        60


 


9/15/20 00:00      Mechanical Ventilator  





      Mechanical Ventilator  


 


9/15/20 00:00 98.6 59 21 124/82 (96) 99   


 


9/15/20 00:00  59      


 


9/14/20 23:40  57 26     60


 


9/14/20 23:40        60


 


9/14/20 23:00  57 22 116/60 (78) 100   


 


9/14/20 22:00  58 20 124/77 (93) 99   


 


9/14/20 21:00        70


 


9/14/20 21:00  55 26 122/69 (86) 100   


 


9/14/20 20:00 98.5 61 26 109/50 (69) 100   


 


9/14/20 20:00      Mechanical Ventilator  





      Mechanical Ventilator  


 


9/14/20 20:00        90


 


9/14/20 19:45  62      


 


9/14/20 19:30  64 26     70


 


9/14/20 19:00  58 22 108/73 (85) 100   


 


9/14/20 18:00  53 26 131/66 (87) 100   








General Appearance:  no apparent distress, alert, on vent, patient on isolation

, isolation precautions


Cardiovascular:  normal rate


Respiratory/Chest:  lungs clear


Abdomen:  normal bowel sounds, non tender, soft


Extremities:  moderate edema











Intake and Output  


 


 9/14/20 9/15/20





 19:00 07:00


 


Intake Total 720 ml 1120 ml


 


Output Total 550 ml 690 ml


 


Balance 170 ml 430 ml


 


  


 


IV Total  200 ml


 


Tube Feeding 720 ml 720 ml


 


Other  200 ml


 


Output Urine Total 550 ml 590 ml


 


Stool Total  100 ml


 


# Bowel Movements 1 











Laboratory Tests








Test


 9/15/20


03:40


 


White Blood Count


 16.3 K/UL


(4.8-10.8)  H


 


Red Blood Count


 2.54 M/UL


(4.20-5.40)  L


 


Hemoglobin


 8.4 G/DL


(12.0-16.0)  L


 


Hematocrit


 25.1 %


(37.0-47.0)  L


 


Mean Corpuscular Volume 99 FL (80-99)  


 


Mean Corpuscular Hemoglobin


 33.1 PG


(27.0-31.0)  H


 


Mean Corpuscular Hemoglobin


Concent 33.5 G/DL


(32.0-36.0)


 


Red Cell Distribution Width


 15.2 %


(11.6-14.8)  H


 


Platelet Count


 150 K/UL


(150-450)


 


Mean Platelet Volume


 9.2 FL


(6.5-10.1)


 


Neutrophils (%) (Auto)


 % (45.0-75.0)





 


Lymphocytes (%) (Auto)


 % (20.0-45.0)





 


Monocytes (%) (Auto)  % (1.0-10.0)  


 


Eosinophils (%) (Auto)  % (0.0-3.0)  


 


Basophils (%) (Auto)  % (0.0-2.0)  


 


Differential Total Cells


Counted 100  





 


Neutrophils % (Manual) 89 % (45-75)  H


 


Lymphocytes % (Manual) 4 % (20-45)  L


 


Monocytes % (Manual) 7 % (1-10)  


 


Eosinophils % (Manual) 0 % (0-3)  


 


Basophils % (Manual) 0 % (0-2)  


 


Band Neutrophils 0 % (0-8)  


 


Platelet Estimate Adequate  


 


Platelet Morphology Normal  


 


Hypochromasia 1+  


 


Anisocytosis 1+  


 


Sodium Level


 151 MMOL/L


(136-145)  H


 


Potassium Level


 3.4 MMOL/L


(3.5-5.1)  L


 


Chloride Level


 116 MMOL/L


()  H


 


Carbon Dioxide Level


 28 MMOL/L


(21-32)


 


Anion Gap


 7 mmol/L


(5-15)


 


Blood Urea Nitrogen


 31 mg/dL


(7-18)  H


 


Creatinine


 0.8 MG/DL


(0.55-1.30)


 


Estimat Glomerular Filtration


Rate > 60 mL/min


(>60)


 


Glucose Level


 197 MG/DL


()  H


 


Calcium Level


 7.6 MG/DL


(8.5-10.1)  L


 


Phosphorus Level


 6.3 MG/DL


(2.5-4.9)  H


 


Magnesium Level


 1.8 MG/DL


(1.8-2.4)


 


Total Bilirubin


 0.6 MG/DL


(0.2-1.0)


 


Aspartate Amino Transf


(AST/SGOT) 27 U/L (15-37)





 


Alanine Aminotransferase


(ALT/SGPT) 86 U/L (12-78)


H


 


Alkaline Phosphatase


 54 U/L


()


 


Total Protein


 4.8 G/DL


(6.4-8.2)  L


 


Albumin


 1.3 G/DL


(3.4-5.0)  L


 


Globulin 3.5 g/dL  


 


Albumin/Globulin Ratio


 0.4 (1.0-2.7)


L











Microbiology








 Date/Time


Source Procedure


Growth Status





 


 9/14/20 18:05


Stool Clostridium difficile Toxin Assay - Final Complete

















Constantine Jorgensen MD Sep 15, 2020 17:17

## 2020-09-15 NOTE — NUR
NURSE NOTES:

Informed Dr. Cherry regarding patient being hypertensive and Potassium level. Orders 
received.

## 2020-09-15 NOTE — NUR
NURSE HAND-OFF REPORT: 



Latest Vital Signs: Temperature 98.5 , Pulse 62 , B/P 128 /66 , Respiratory Rate 24 , O2 SAT 
99 , Mechanical Ventilator, O2 Flow Rate 15.0 .  

Vital Sign Comment: 



EKG Rhythm: Sinus Rhythm

Rhythm change?: N 

MD Notified?: -

MD Response: 



Latest Momin Fall Score: 70  

Fall Risk: High Risk 

Safety Measures: Call light Within Reach, Bed Alarm Zone 1, Side Rails Side Rails x3, Bed 
position Low and Locked.

Fall Precautions: 

Yellow Socks

Door Sign

Patient Fall Education



Report given to LAYTON DE LA FUENTE.

## 2020-09-15 NOTE — NUR
NURSE NOTES:

Turned and repositioned patient. Medications given as prescribed, no adverse reaction noted. 
Patient is afebrile and FLACC score 0.

## 2020-09-16 VITALS — SYSTOLIC BLOOD PRESSURE: 146 MMHG | DIASTOLIC BLOOD PRESSURE: 90 MMHG

## 2020-09-16 VITALS — DIASTOLIC BLOOD PRESSURE: 57 MMHG | SYSTOLIC BLOOD PRESSURE: 146 MMHG

## 2020-09-16 VITALS — DIASTOLIC BLOOD PRESSURE: 61 MMHG | SYSTOLIC BLOOD PRESSURE: 117 MMHG

## 2020-09-16 VITALS — DIASTOLIC BLOOD PRESSURE: 59 MMHG | SYSTOLIC BLOOD PRESSURE: 118 MMHG

## 2020-09-16 VITALS — SYSTOLIC BLOOD PRESSURE: 122 MMHG | DIASTOLIC BLOOD PRESSURE: 107 MMHG

## 2020-09-16 VITALS — SYSTOLIC BLOOD PRESSURE: 127 MMHG | DIASTOLIC BLOOD PRESSURE: 54 MMHG

## 2020-09-16 VITALS — DIASTOLIC BLOOD PRESSURE: 56 MMHG | SYSTOLIC BLOOD PRESSURE: 116 MMHG

## 2020-09-16 VITALS — DIASTOLIC BLOOD PRESSURE: 64 MMHG | SYSTOLIC BLOOD PRESSURE: 108 MMHG

## 2020-09-16 VITALS — SYSTOLIC BLOOD PRESSURE: 129 MMHG | DIASTOLIC BLOOD PRESSURE: 60 MMHG

## 2020-09-16 VITALS — SYSTOLIC BLOOD PRESSURE: 129 MMHG | DIASTOLIC BLOOD PRESSURE: 62 MMHG

## 2020-09-16 VITALS — DIASTOLIC BLOOD PRESSURE: 120 MMHG | SYSTOLIC BLOOD PRESSURE: 147 MMHG

## 2020-09-16 VITALS — DIASTOLIC BLOOD PRESSURE: 77 MMHG | SYSTOLIC BLOOD PRESSURE: 131 MMHG

## 2020-09-16 VITALS — SYSTOLIC BLOOD PRESSURE: 105 MMHG | DIASTOLIC BLOOD PRESSURE: 57 MMHG

## 2020-09-16 VITALS — SYSTOLIC BLOOD PRESSURE: 98 MMHG | DIASTOLIC BLOOD PRESSURE: 51 MMHG

## 2020-09-16 VITALS — SYSTOLIC BLOOD PRESSURE: 102 MMHG | DIASTOLIC BLOOD PRESSURE: 49 MMHG

## 2020-09-16 VITALS — DIASTOLIC BLOOD PRESSURE: 56 MMHG | SYSTOLIC BLOOD PRESSURE: 123 MMHG

## 2020-09-16 VITALS — DIASTOLIC BLOOD PRESSURE: 54 MMHG | SYSTOLIC BLOOD PRESSURE: 97 MMHG

## 2020-09-16 VITALS — SYSTOLIC BLOOD PRESSURE: 147 MMHG | DIASTOLIC BLOOD PRESSURE: 135 MMHG

## 2020-09-16 VITALS — SYSTOLIC BLOOD PRESSURE: 107 MMHG | DIASTOLIC BLOOD PRESSURE: 75 MMHG

## 2020-09-16 VITALS — SYSTOLIC BLOOD PRESSURE: 117 MMHG | DIASTOLIC BLOOD PRESSURE: 54 MMHG

## 2020-09-16 VITALS — SYSTOLIC BLOOD PRESSURE: 100 MMHG | DIASTOLIC BLOOD PRESSURE: 61 MMHG

## 2020-09-16 VITALS — DIASTOLIC BLOOD PRESSURE: 84 MMHG | SYSTOLIC BLOOD PRESSURE: 125 MMHG

## 2020-09-16 VITALS — SYSTOLIC BLOOD PRESSURE: 105 MMHG | DIASTOLIC BLOOD PRESSURE: 54 MMHG

## 2020-09-16 VITALS — SYSTOLIC BLOOD PRESSURE: 128 MMHG | DIASTOLIC BLOOD PRESSURE: 55 MMHG

## 2020-09-16 VITALS — SYSTOLIC BLOOD PRESSURE: 123 MMHG | DIASTOLIC BLOOD PRESSURE: 70 MMHG

## 2020-09-16 VITALS — DIASTOLIC BLOOD PRESSURE: 75 MMHG | SYSTOLIC BLOOD PRESSURE: 117 MMHG

## 2020-09-16 LAB
ADD MANUAL DIFF: YES
ALBUMIN SERPL-MCNC: 1.3 G/DL (ref 3.4–5)
ALBUMIN/GLOB SERPL: 0.4 {RATIO} (ref 1–2.7)
ALP SERPL-CCNC: 55 U/L (ref 46–116)
ALT SERPL-CCNC: 69 U/L (ref 12–78)
ANION GAP SERPL CALC-SCNC: 9 MMOL/L (ref 5–15)
AST SERPL-CCNC: 29 U/L (ref 15–37)
BILIRUB SERPL-MCNC: 0.5 MG/DL (ref 0.2–1)
BUN SERPL-MCNC: 30 MG/DL (ref 7–18)
CALCIUM SERPL-MCNC: 7.4 MG/DL (ref 8.5–10.1)
CHLORIDE SERPL-SCNC: 116 MMOL/L (ref 98–107)
CO2 SERPL-SCNC: 28 MMOL/L (ref 21–32)
CREAT SERPL-MCNC: 0.7 MG/DL (ref 0.55–1.3)
ERYTHROCYTE [DISTWIDTH] IN BLOOD BY AUTOMATED COUNT: 14.8 % (ref 11.6–14.8)
GLOBULIN SER-MCNC: 3.4 G/DL
HCT VFR BLD CALC: 24.3 % (ref 37–47)
HGB BLD-MCNC: 7.9 G/DL (ref 12–16)
MCV RBC AUTO: 99 FL (ref 80–99)
PHOSPHATE SERPL-MCNC: 1.8 MG/DL (ref 2.5–4.9)
PLATELET # BLD: 140 K/UL (ref 150–450)
POTASSIUM SERPL-SCNC: 3.1 MMOL/L (ref 3.5–5.1)
RBC # BLD AUTO: 2.45 M/UL (ref 4.2–5.4)
SODIUM SERPL-SCNC: 153 MMOL/L (ref 136–145)
WBC # BLD AUTO: 15.3 K/UL (ref 4.8–10.8)

## 2020-09-16 RX ADMIN — CHLORHEXIDINE GLUCONATE SCH APPLIC: 213 SOLUTION TOPICAL at 19:31

## 2020-09-16 RX ADMIN — VALPROIC ACID SCH MG: 250 SOLUTION ORAL at 21:49

## 2020-09-16 RX ADMIN — VALPROIC ACID SCH MG: 250 SOLUTION ORAL at 13:28

## 2020-09-16 RX ADMIN — MIDODRINE HYDROCHLORIDE SCH MG: 10 TABLET ORAL at 13:28

## 2020-09-16 RX ADMIN — MIDODRINE HYDROCHLORIDE SCH MG: 10 TABLET ORAL at 21:49

## 2020-09-16 RX ADMIN — SODIUM CHLORIDE SCH MLS/HR: 900 INJECTION, SOLUTION INTRAVENOUS at 07:40

## 2020-09-16 RX ADMIN — HYDROCORTISONE SODIUM SUCCINATE SCH MG: 100 INJECTION, POWDER, FOR SOLUTION INTRAMUSCULAR; INTRAVENOUS at 21:48

## 2020-09-16 RX ADMIN — MIDODRINE HYDROCHLORIDE SCH MG: 10 TABLET ORAL at 05:32

## 2020-09-16 RX ADMIN — HYDROCORTISONE SODIUM SUCCINATE SCH MG: 100 INJECTION, POWDER, FOR SOLUTION INTRAMUSCULAR; INTRAVENOUS at 05:31

## 2020-09-16 RX ADMIN — DOPAMINE HYDROCHLORIDE IN DEXTROSE SCH MLS/HR: 1.6 INJECTION, SOLUTION INTRAVENOUS at 11:15

## 2020-09-16 RX ADMIN — PANTOPRAZOLE SODIUM SCH MG: 40 INJECTION, POWDER, FOR SOLUTION INTRAVENOUS at 09:58

## 2020-09-16 RX ADMIN — VALPROIC ACID SCH MG: 250 SOLUTION ORAL at 05:32

## 2020-09-16 RX ADMIN — HYDROCORTISONE SODIUM SUCCINATE SCH MG: 100 INJECTION, POWDER, FOR SOLUTION INTRAMUSCULAR; INTRAVENOUS at 13:28

## 2020-09-16 NOTE — INTERNAL MED PROGRESS NOTE
Subjective


Date of Service:  Sep 16, 2020


Physician Name


Sanya Schultz


Attending Physician


Chano Cherry MD





Current Medications








 Medications


  (Trade)  Dose


 Ordered  Sig/Didi


 Route


 PRN Reason  Start Time


 Stop Time Status Last Admin


Dose Admin


 


 Acetaminophen


  (Tylenol)  650 mg  Q4H  PRN


 GT


 FEVER  8/26/20 11:45


 9/25/20 11:44  9/7/20 03:17





 


 Chlorhexidine


 Gluconate


  (Beatrice-Hex 2%)  1 applic  DAILY@2000


 TOPIC


   8/31/20 20:00


 11/29/20 19:59  9/15/20 19:38





 


 Clotrimazole


  (Lotrimin)  1 applic  Q12HR


 TOPIC


   8/30/20 13:00


 11/28/20 12:59  9/16/20 08:19





 


 Dextrose


  (Dextrose 50%)  25 ml  Q30M  PRN


 IV


 Hypoglycemia  8/26/20 11:30


 11/20/20 11:29   





 


 Dextrose


  (Dextrose 50%)  50 ml  Q30M  PRN


 IV


 Hypoglycemia  8/26/20 11:30


 11/20/20 11:29   





 


 Dopamine HCl/


 Dextrose  250 ml @ 0


 mls/hr  Q24H


 IV


   9/4/20 11:15


 12/3/20 11:14  9/6/20 19:47





 


 Hydrocortisone


  (Solu-CORTEF)  100 mg  EVERY 8  HOURS


 IV


   8/30/20 14:00


 11/28/20 13:59  9/16/20 05:31





 


 Levothyroxine


 Sodium


  (Synthroid)  75 mcg  DAILY


 GT


   8/27/20 09:00


 9/22/20 08:59  9/16/20 08:19





 


 Loperamide HCl


  (Imodium)  2 mg  Q6H  PRN


 NG


 Diarrhea  9/16/20 10:30


 10/16/20 10:29   





 


 Midodrine


  (Pro-Amatine)  10 mg  Q8HR


 GT


   8/28/20 14:00


 11/26/20 13:59  9/16/20 05:32





 


 Norepinephrine


 Bitartrate 16 mg/


 Dextrose  500 ml @ 0


 mls/hr  Q24H


 IV


   8/31/20 07:45


 9/29/20 12:59  9/4/20 08:43





 


 Ondansetron HCl


  (Zofran)  4 mg  Q6H  PRN


 IVP


 Nausea & Vomiting  8/26/20 12:00


 9/21/20 11:59   





 


 Pantoprazole


  (Protonix)  40 mg  DAILY


 IV


   8/27/20 09:00


 9/22/20 08:59  9/16/20 09:58





 


 Phenylephrine HCl


 50 mg/Dextrose  250 ml @ 0


 mls/hr  Q24H  PRN


 IV


 For hypotension  8/29/20 17:45


 9/28/20 17:44  8/31/20 01:49





 


 Polyethylene


 Glycol


  (Miralax)  17 gm  DAILYPRN  PRN


 GT


 Constipation  8/26/20 12:00


 9/21/20 11:59   





 


 Valproic Acid


  (Depakene)  500 mg  EVERY 8  HOURS


 GT


   8/26/20 14:00


 9/21/20 21:59  9/16/20 05:32











Allergies:  


Coded Allergies:  


     No Known Allergies (Unverified , 1/8/19)


ROS Limited/Unobtainable:  Yes


Subjective


60 YO F with Down's syndrome admitted with hypoxia.  Now sepsis and pneumonia.  

Cover for Int Med-DR Hung.  ICU.  Intubated and sedated





Objective





Last Vital Signs








  Date Time  Temp Pulse Resp B/P (MAP) Pulse Ox O2 Delivery O2 Flow Rate FiO2


 


9/16/20 11:00  46 26 127/54 (78) 99   


 


9/16/20 11:00        100


 


9/16/20 08:00      Mechanical Ventilator  





      Mechanical Ventilator  


 


9/16/20 08:00 98.8       











Laboratory Tests








Test


 9/16/20


03:10


 


White Blood Count


 15.3 K/UL


(4.8-10.8)  H


 


Red Blood Count


 2.45 M/UL


(4.20-5.40)  L


 


Hemoglobin


 7.9 G/DL


(12.0-16.0)  L


 


Hematocrit


 24.3 %


(37.0-47.0)  L


 


Mean Corpuscular Volume 99 FL (80-99)  


 


Mean Corpuscular Hemoglobin


 32.4 PG


(27.0-31.0)  H


 


Mean Corpuscular Hemoglobin


Concent 32.7 G/DL


(32.0-36.0)


 


Red Cell Distribution Width


 14.8 %


(11.6-14.8)


 


Platelet Count


 140 K/UL


(150-450)  L


 


Mean Platelet Volume


 9.4 FL


(6.5-10.1)


 


Neutrophils (%) (Auto)


 % (45.0-75.0)





 


Lymphocytes (%) (Auto)


 % (20.0-45.0)





 


Monocytes (%) (Auto)  % (1.0-10.0)  


 


Eosinophils (%) (Auto)  % (0.0-3.0)  


 


Basophils (%) (Auto)  % (0.0-2.0)  


 


Differential Total Cells


Counted 100  





 


Neutrophils % (Manual) 88 % (45-75)  H


 


Lymphocytes % (Manual) 5 % (20-45)  L


 


Monocytes % (Manual) 7 % (1-10)  


 


Eosinophils % (Manual) 0 % (0-3)  


 


Basophils % (Manual) 0 % (0-2)  


 


Band Neutrophils 0 % (0-8)  


 


Platelet Estimate Decreased  L


 


Platelet Morphology Normal  


 


Hypochromasia 3+  


 


Anisocytosis 1+  


 


Sodium Level


 153 MMOL/L


(136-145)  H


 


Potassium Level


 3.1 MMOL/L


(3.5-5.1)  L


 


Chloride Level


 116 MMOL/L


()  H


 


Carbon Dioxide Level


 28 MMOL/L


(21-32)


 


Anion Gap


 9 mmol/L


(5-15)


 


Blood Urea Nitrogen


 30 mg/dL


(7-18)  H


 


Creatinine


 0.7 MG/DL


(0.55-1.30)


 


Estimat Glomerular Filtration


Rate > 60 mL/min


(>60)


 


Glucose Level


 246 MG/DL


()  H


 


Calcium Level


 7.4 MG/DL


(8.5-10.1)  L


 


Phosphorus Level


 1.8 MG/DL


(2.5-4.9)  L


 


Magnesium Level


 1.6 MG/DL


(1.8-2.4)  L


 


Total Bilirubin


 0.5 MG/DL


(0.2-1.0)


 


Aspartate Amino Transf


(AST/SGOT) 29 U/L (15-37)





 


Alanine Aminotransferase


(ALT/SGPT) 69 U/L (12-78)





 


Alkaline Phosphatase


 55 U/L


()


 


Total Protein


 4.7 G/DL


(6.4-8.2)  L


 


Albumin


 1.3 G/DL


(3.4-5.0)  L


 


Globulin 3.4 g/dL  


 


Albumin/Globulin Ratio


 0.4 (1.0-2.7)


L


 


Valproic Acid (Depakene) Level


 21 MCG/ML


()  L











Microbiology








 Date/Time


Source Procedure


Growth Status





 


 9/14/20 18:05


Stool Clostridium difficile Toxin Assay - Final Complete

















Intake and Output  


 


 9/15/20 9/16/20





 19:00 07:00


 


Intake Total 946.66 ml 880 ml


 


Output Total 1120 ml 800 ml


 


Balance -173.34 ml 80 ml


 


  


 


Free Water 100 ml 


 


IV Total 126.66 ml 


 


Tube Feeding 720 ml 720 ml


 


Other  160 ml


 


Output Urine Total 1020 ml 700 ml


 


Stool Total 100 ml 100 ml








Objective


General Appearance:  WD/WN, no apparent distress, alert


EENT:  PERRL/EOMI, normal ENT inspection


Neck:  non-tender, normal alignment, supple, normal inspection


Cardiovascular:  normal peripheral pulses, normal rate, regular rhythm, no 

gallop/murmur, no JVD


Respiratory/Chest:  Mech vent; decreased breath sounds, crackles/rales, rhonchi 

- bilaterally, expiratory wheezing


Abdomen:  normal bowel sounds, non tender, soft, no organomegaly, no mass


Extremities:  normal range of motion


Neurologic:  CNs II-XII grossly normal


Skin:  normal pigmentation, warm/dry





Assessment/Plan


Problem List:  


(1) HCAP (healthcare-associated pneumonia)


Assessment & Plan:  Strep Group G.  Continue amikacin per ID=Dr Gomes.  

Pulmonary/Critical care=DR Cherry.  COVID NEG





(2) Sepsis


Assessment & Plan:  Staph haemolyticus.  Continue amikacin per ID=Dr Gomes





(3) Down's syndrome


(4) Dysphagia


Assessment & Plan:  S/P PEG





(5) Seizure disorder


Assessment & Plan:  Continue keppra and depakote





(6) Hypothyroidism


Assessment & Plan:  Continue synthroid





(7) Acute respiratory failure


Assessment & Plan:  Pulmonary = Dr Cherry; [facility] vent














Sanya Schultz MD               Sep 16, 2020 13:01

## 2020-09-16 NOTE — NEPHROLOGY PROGRESS NOTE
Assessment/Plan


Problem List:  


(1) DAVID (acute kidney injury)


(2) Respiratory failure requiring intubation


(3) Down's syndrome


(4) Seizure disorder


(5) Hypothyroidism


Assessment





Acute renal failure, likely due to hypotension


Acute respiratory distress, hypoxia


Seizure disorder


Hypothyroidism


Down syndrome


Full code











Fluid challenge with IV fluids and albumin


Midodrine for BP above 100 systolic


Check TSH level


Check


Correct level


Monitor renal parameters


Urine studies


Per orders


Plan


September 16: Lab reviewed.  Low phosphorus low magnesium and low potassium was 

addressed.  Hemoglobin drifting lower.  Continue per consultants.  Patient 

remains full code.


September 15: Labs reviewed.  Patient continues to be on ventilator.  D5W for 

high sodium and also potassium chloride intravenously as supplement given.  H

emoglobin 8.4.  Continue to monitor electrolytes and renal parameters.


September 14: Labs reviewed.  Low potassium and high sodium noted.  Hemoglobin 

8.1 stable.  Aim to correct abnormal electrolyte.  Continue rest.  Will give 2 

boluses of D5W 500 cc.


September 13: Lab reviewed.  Abnormal electrolytes noted and addressed.


September 12: Labs reviewed.  Potassium supplement given.  Patient remains full 

code.  Continue per consultants.


September 11: Lab reviewed.  Electrolyte abnormalities addressed.  Continue per 

pulmonary and ID.


September 10: Lab reviewed.  Status unchanged.  Serum sodium 151 unchanged.  

Stable from renal standpoint of view.


September 9: Labs reviewed.  Status quo.  D5W 500 cc IV ordered.  Continue to 

monitor renal parameters.


September 8: Status quo.  Labs reviewed.  Overall condition unchanged.  Patient 

was transfused and hemoglobin higher.  Continue current management.  Patient 

remains full code.


September 7: Status quo.  Overall condition poor.  Very low albumin.  Edematous.

 Hypotensive.  Hemoglobin lower.  Anemia work-up ordered.  I favor transfusion 2

units of packed RBCs.  Patient remains full code.  I favor supportive care only.

 Will discuss.


September 6: Electrolyte abnormalities addressed.  Serum creatinine lower.  

Continue per current management.


September 5: Status unchanged.  Lab reviewed.  Serum potassium 2.7.  IV 

potassium chloride ordered.  Serum creatinine low at 1.6 stable.  Blood pressure

90s systolic


September 4: Status quo.  Labs reviewed.  Renal parameters stable.  Serum 

creatinine down to 1.6.  Medication list reviewed.  Continues to be on 

midodrine.  Continue per consultants.


September 3: Status quo.  Labs reviewed.  Electrolytes adjusted.  Serum 

creatinine down to 1.8.  Continue per consultants.


September 2: Status quo.  Labs reviewed.  Phosphorus supplement IV given.  Serum

creatinine 2.  Continue per consultants.


September 1: Requires less pressors.  Albumin bolus given.  1 dose of Lasix IV 

ordered as the patient severely edematous.  Patient serum albumin is very low.  

Continue per consultants.


August 31: Continues to be intubated.  Labs reviewed.  Serum creatinine 1.9 

unchanged.  Blood pressure more stable.  Off 1 of the pressors.  Continue to 

monitor renal parameters.  Continue per consultants.  Patient now on hydroc

ortisone 100 mg every 8 hours.  Will decrease IV fluid.  Normal saline down to 

50 cc an hour.


August 30: Intubated.  Labs reviewed.  Creatinine 1.9 unchanged.  Continue same 

treatment plan.  Per consultants.  Overall poor prognosis since the patient 

remains on pressors and her pulmonary status is worsening.


August 29: Remains intubated.  Labs reviewed.  Creatinine 1.9.  Blood pressure 

systolic 90s.  Continue per consultants.


August 28: Remains intubated.  Labs reviewed.  Serum creatinine lower to 2.  

Vancomycin level lower.  Remains hypotensive on pressors.  Will increase 

midodrine to 10 mg every 8 hours.  Continue per consultants.  Continue to 

monitor renal parameters.


August 27: Patient now in ICU.  Intubated.  On pressors.  Labs reviewed.  Will 

increase midodrine.  Aim to keep blood pressure over 100 systolic.  Will give 

albumin bolus.  Will check vancomycin level which was elevated when checked 

previously on August 24.  Will monitor renal parameters.  Continue per 

consultants.





Subjective


ROS Limited/Unobtainable:  Yes





Objective


Objective





Last 24 Hour Vital Signs








  Date Time  Temp Pulse Resp B/P (MAP) Pulse Ox O2 Delivery O2 Flow Rate FiO2


 


9/16/20 09:00  55 24 118/59 (78) 99   


 


9/16/20 08:00      Mechanical Ventilator  





      Mechanical Ventilator  


 


9/16/20 08:00        100


 


9/16/20 08:00 98.8 55 23 122/107 (112) 99   


 


9/16/20 07:41  49      


 


9/16/20 07:05  46 27     100


 


9/16/20 07:00  46 25 128/55 (79) 100   


 


9/16/20 06:00  55 21 129/60 (83) 100   


 


9/16/20 05:00  63 24 100/61 (74) 100   


 


9/16/20 04:00 98.7 57 26 102/49 (66) 98   


 


9/16/20 04:00      Mechanical Ventilator  





      Mechanical Ventilator  


 


9/16/20 04:00        100


 


9/16/20 04:00  57      


 


9/16/20 03:00  59 17 123/56 (78) 93   


 


9/16/20 02:43  60 26     100


 


9/16/20 02:30  63 22 117/61 (79) 99   


 


9/16/20 02:18  65 25 147/120 (129) 98   


 


9/16/20 02:00  56 23 147/135 (139) 99   


 


9/16/20 01:00  53 21 146/90 (108) 100   


 


9/16/20 00:00      Mechanical Ventilator  





      Mechanical Ventilator  


 


9/16/20 00:00 98.4 55 26 129/62 (84) 99   


 


9/16/20 00:00        100


 


9/16/20 00:00  55      


 


9/15/20 23:30  54 26     100


 


9/15/20 23:00  57 21 126/88 (101) 99   


 


9/15/20 22:00  48 26 123/67 (85) 100   


 


9/15/20 21:00  50 26 135/79 (97) 97   


 


9/15/20 20:00        100


 


9/15/20 20:00 98.6 61 21 115/97 (103) 99   


 


9/15/20 20:00      Mechanical Ventilator  





      Mechanical Ventilator  


 


9/15/20 19:30  58 26     100


 


9/15/20 19:30  60      


 


9/15/20 19:00  60 20 120/72 (88) 100   


 


9/15/20 18:00  58 26 118/168 (152) 100   


 


9/15/20 17:00  58 26 116/84 (95) 100   


 


9/15/20 16:00  62      


 


9/15/20 16:00  62 23 126/95 (105) 100   


 


9/15/20 16:00        60


 


9/15/20 16:00      Mechanical Ventilator  





      Mechanical Ventilator  


 


9/15/20 15:29  45 26     100


 


9/15/20 15:00  54 26 132/90 (104) 97   


 


9/15/20 14:00  47 26 107/57 (74) 96   


 


9/15/20 13:00  62 22 111/59 (76) 95   


 


9/15/20 12:31  70 26     60


 


9/15/20 12:00 98.4 62 18 115/65 (82) 96   


 


9/15/20 12:00        60


 


9/15/20 12:00      Mechanical Ventilator  





      Mechanical Ventilator  


 


9/15/20 12:00  62 18 115/65 (82) 96   


 


9/15/20 12:00  63      


 


9/15/20 11:00  61 23 137/62 (87) 90   


 


9/15/20 10:50    160/99    

















Intake and Output  


 


 9/15/20 9/16/20





 19:00 07:00


 


Intake Total 946.66 ml 880 ml


 


Output Total 1120 ml 800 ml


 


Balance -173.34 ml 80 ml


 


  


 


Free Water 100 ml 


 


IV Total 126.66 ml 


 


Tube Feeding 720 ml 720 ml


 


Other  160 ml


 


Output Urine Total 1020 ml 700 ml


 


Stool Total 100 ml 100 ml








Laboratory Tests


9/16/20 03:10: 


White Blood Count 15.3H, Red Blood Count 2.45L, Hemoglobin 7.9L, Hematocrit 

24.3L, Mean Corpuscular Volume 99, Mean Corpuscular Hemoglobin 32.4H, Mean 

Corpuscular Hemoglobin Concent 32.7, Red Cell Distribution Width 14.8, Platelet 

Count 140L, Mean Platelet Volume 9.4, Neutrophils (%) (Auto) , Lymphocytes (%) 

(Auto) , Monocytes (%) (Auto) , Eosinophils (%) (Auto) , Basophils (%) (Auto) , 

Differential Total Cells Counted 100, Neutrophils % (Manual) 88H, Lymphocytes % 

(Manual) 5L, Monocytes % (Manual) 7, Eosinophils % (Manual) 0, Basophils % 

(Manual) 0, Band Neutrophils 0, Platelet Estimate DecreasedL, Platelet 

Morphology Normal, Hypochromasia 3+, Anisocytosis 1+, Sodium Level 153H, 

Potassium Level 3.1L, Chloride Level 116H, Carbon Dioxide Level 28, Anion Gap 9,

Blood Urea Nitrogen 30H, Creatinine 0.7, Estimat Glomerular Filtration Rate > 

60, Glucose Level 246H, Calcium Level 7.4L, Phosphorus Level 1.8L, Magnesium 

Level 1.6L, Total Bilirubin 0.5, Aspartate Amino Transf (AST/SGOT) 29, Alanine 

Aminotransferase (ALT/SGPT) 69, Alkaline Phosphatase 55, Total Protein 4.7L, 

Albumin 1.3L, Globulin 3.4, Albumin/Globulin Ratio 0.4L, Valproic Acid 

(Depakene) Level [Pending]


Height (Feet):  5


Height (Inches):  3.00


Weight (Pounds):  172


General Appearance:  no apparent distress


EENT:  other - On ventilator


Cardiovascular:  normal rate - Rate in 50s


Respiratory/Chest:  decreased breath sounds


Abdomen:  distended


Extremities:  moderate edema











Lam Nino MD            Sep 16, 2020 10:17

## 2020-09-16 NOTE — NUR
NURSE NOTES:

MORNING CARE AND ORAL CARE WAS DONE, RECTAL TUBE INTACT AND PATENT, BROWN COLOR SOLID STOOL 
OUTED.

## 2020-09-16 NOTE — NUR
NURSE NOTES:

Patient received lying in bed, awake, makes eye contact. Orally intubated, on vent settings: 
AC-24, TV-500, FiO2-100%, PEEP - 10, oxygen saturation 100%, respiration even and unlabored. 
No respiratory distress. On g-tube feeding, Vital 1.2 at 60 ml/hr, restarted. Placed on 
semi-gates's position, on aspiration precaution. On rectal tube, draining brown output. 
Valle catheter patent and draining, yellow urine noted. Left upper arm PICC dressing intact, 
running TKO. Upper and lower extremities elevated, edema noted to both. Sinus bradycardia, 
rate of 50s. Safety measures implemented, bed kept at lowest position. Will continue to 
monitor.

## 2020-09-16 NOTE — NUR
NURSE HAND-OFF REPORT: 



Latest Vital Signs: Temperature 98.7 , Pulse 46 , B/P 128 /55 , Respiratory Rate 25 , O2 SAT 
100 , Mechanical Ventilator, O2 Flow Rate 15.0 .  

Vital Sign Comment: 



EKG Rhythm: Sinus Bradycardia

Rhythm change?: N 

MD Notified?: -

MD Response: 



Latest Momin Fall Score: 70  

Fall Risk: High Risk 

Safety Measures: Call light Within Reach, Bed Alarm Zone 1, Side Rails Side Rails x3, Bed 
position Low and Locked.

Fall Precautions: 

Yellow Socks

Door Sign

Patient Fall Education



Report given to MIYEON,RN.

## 2020-09-16 NOTE — GENERAL PROGRESS NOTE
Assessment/Plan


Assessment/Plan:


1. History of Down syndrome.


2. Dysphagia with G-tube.


3. Seizure disorder.


4. Hypothyroidism.


5. DAVID.


6. Pneumonia.


7. Sepsis.








fu H&H


ppi


GTF


hold GI procedures for now


check C.diff


stool ob + 1/2





Subjective


ROS Limited/Unobtainable:  No


Allergies:  


Coded Allergies:  


     No Known Allergies (Unverified , 1/8/19)





Objective





Last 24 Hour Vital Signs








  Date Time  Temp Pulse Resp B/P (MAP) Pulse Ox O2 Delivery O2 Flow Rate FiO2


 


9/16/20 08:00 98.8 55 23 122/107 (112) 99   


 


9/16/20 07:00  46 25 128/55 (79) 100   


 


9/16/20 06:00  55 21 129/60 (83) 100   


 


9/16/20 05:00  63 24 100/61 (74) 100   


 


9/16/20 04:00 98.7 57 26 102/49 (66) 98   


 


9/16/20 04:00      Mechanical Ventilator  





      Mechanical Ventilator  


 


9/16/20 04:00        100


 


9/16/20 04:00  57      


 


9/16/20 03:00  59 17 123/56 (78) 93   


 


9/16/20 02:43  60 26     100


 


9/16/20 02:30  63 22 117/61 (79) 99   


 


9/16/20 02:18  65 25 147/120 (129) 98   


 


9/16/20 02:00  56 23 147/135 (139) 99   


 


9/16/20 01:00  53 21 146/90 (108) 100   


 


9/16/20 00:00      Mechanical Ventilator  





      Mechanical Ventilator  


 


9/16/20 00:00 98.4 55 26 129/62 (84) 99   


 


9/16/20 00:00        100


 


9/16/20 00:00  55      


 


9/15/20 23:30  54 26     100


 


9/15/20 23:00  57 21 126/88 (101) 99   


 


9/15/20 22:00  48 26 123/67 (85) 100   


 


9/15/20 21:00  50 26 135/79 (97) 97   


 


9/15/20 20:00        100


 


9/15/20 20:00 98.6 61 21 115/97 (103) 99   


 


9/15/20 20:00      Mechanical Ventilator  





      Mechanical Ventilator  


 


9/15/20 19:30  58 26     100


 


9/15/20 19:30  60      


 


9/15/20 19:00  60 20 120/72 (88) 100   


 


9/15/20 18:00  58 26 118/168 (152) 100   


 


9/15/20 17:00  58 26 116/84 (95) 100   


 


9/15/20 16:00  62      


 


9/15/20 16:00  62 23 126/95 (105) 100   


 


9/15/20 16:00        60


 


9/15/20 16:00      Mechanical Ventilator  





      Mechanical Ventilator  


 


9/15/20 15:29  45 26     100


 


9/15/20 15:00  54 26 132/90 (104) 97   


 


9/15/20 14:00  47 26 107/57 (74) 96   


 


9/15/20 13:00  62 22 111/59 (76) 95   


 


9/15/20 12:31  70 26     60


 


9/15/20 12:00 98.4 62 18 115/65 (82) 96   


 


9/15/20 12:00        60


 


9/15/20 12:00      Mechanical Ventilator  





      Mechanical Ventilator  


 


9/15/20 12:00  62 18 115/65 (82) 96   


 


9/15/20 12:00  63      


 


9/15/20 11:00  61 23 137/62 (87) 90   


 


9/15/20 10:50    160/99    


 


9/15/20 10:00  52 26 160/99 (119) 93   


 


9/15/20 09:00  57 23 138/118 (125) 96   

















Intake and Output  


 


 9/15/20 9/16/20





 19:00 07:00


 


Intake Total 946.66 ml 880 ml


 


Output Total 1120 ml 800 ml


 


Balance -173.34 ml 80 ml


 


  


 


Free Water 100 ml 


 


IV Total 126.66 ml 


 


Tube Feeding 720 ml 720 ml


 


Other  160 ml


 


Output Urine Total 1020 ml 700 ml


 


Stool Total 100 ml 100 ml








Laboratory Tests


9/16/20 03:10: 


White Blood Count 15.3H, Red Blood Count 2.45L, Hemoglobin 7.9L, Hematocrit 

24.3L, Mean Corpuscular Volume 99, Mean Corpuscular Hemoglobin 32.4H, Mean 

Corpuscular Hemoglobin Concent 32.7, Red Cell Distribution Width 14.8, Platelet 

Count 140L, Mean Platelet Volume 9.4, Neutrophils (%) (Auto) , Lymphocytes (%) 

(Auto) , Monocytes (%) (Auto) , Eosinophils (%) (Auto) , Basophils (%) (Auto) , 

Neutrophils % (Manual) [Pending], Lymphocytes % (Manual) [Pending], Platelet 

Estimate [Pending], Platelet Morphology [Pending], Sodium Level 153H, Potassium 

Level 3.1L, Chloride Level 116H, Carbon Dioxide Level 28, Anion Gap 9, Blood 

Urea Nitrogen 30H, Creatinine 0.7, Estimat Glomerular Filtration Rate > 60, 

Glucose Level 246H, Calcium Level 7.4L, Phosphorus Level 1.8L, Magnesium Level 

1.6L, Total Bilirubin 0.5, Aspartate Amino Transf (AST/SGOT) 29, Alanine Ami

notransferase (ALT/SGPT) 69, Alkaline Phosphatase 55, Total Protein 4.7L, 

Albumin 1.3L, Globulin 3.4, Albumin/Globulin Ratio 0.4L, Valproic Acid 

(Depakene) Level [Pending]


Height (Feet):  5


Height (Inches):  3.00


Weight (Pounds):  172


General Appearance:  no apparent distress


EENT:  normal ENT inspection


Neck:  supple


Cardiovascular:  normal rate


Respiratory/Chest:  decreased breath sounds


Abdomen:  hypoactive bowel sounds


Extremities:  non-tender











Ted Nagy MD             Sep 16, 2020 08:58

## 2020-09-16 NOTE — NUR
CASE MANAGEMENT: REVIEW 



9/16/2020



SI:SEPSIS. PNA. 

HX: DOWN SYNDROME 

98.8   55   23   122/107   99% ON MECH VENT ET FIO2 100

WBC 15.3   H/H 7.9/24.3  NA+153   K+3.1   CL-116  BUN 30    CA+7.4  PHOS 1.8  MG 1.6 
 ALB 1.3

  



IS:IV KPHOS/NS BOLUS X1

IV KCL BOLUS X1

IV MG SULFATE X2 BAGS

IV LEVOPHED PROTOCOL ~TITRATED 

IV DOPAMINE PROTOCOL ~TITRATED 

LOTRIMIN TP BID



CHEST X-RAY -improved aeration of both lungs.



***ICU*** 



PLAN OF CARE: 

PLACE RECTAL TUBE 

ADJUST FiO2 

CONT REPARATORY CARE ~WEAN

## 2020-09-16 NOTE — NUR
NURSE HAND-OFF REPORT: 



Latest Vital Signs: Temperature 98.8 , Pulse 55 , B/P 118 /59 , Respiratory Rate 24 , O2 SAT 
99 , Mechanical Ventilator, O2 Flow Rate 15.0 .  

Vital Sign Comment: 



EKG Rhythm: Sinus Bradycardia

Rhythm change?: N 

MD Notified?: -

MD Response: 



Latest Momin Fall Score: 70  

Fall Risk: High Risk 

Safety Measures: Call light Within Reach, Bed Alarm Zone 1, Side Rails Side Rails x3, Bed 
position Low and Locked.

Fall Precautions: 

Yellow Socks

Door Sign

Patient Fall Education



Report given to LAYTON Benavides.

## 2020-09-16 NOTE — PULMONOLGY CRITICAL CARE NOTE
Critical Care - Asmt/Plan


Problems:  


(1) Acute respiratory failure


(2) Bacteremia


(3) Pneumonia


(4) Sepsis


(5) HCAP (healthcare-associated pneumonia)


(6) Seizure disorder


(7) Down's syndrome


Respiratory:  monitor respiratory rate, adjust FIO2, CXR


Cardiac:  continue to monitor HR/BP


Renal:  F/U I&O, keep IV fluid, check electrolytes


Infectious Disease:  check cultures, continue antibiotics


Gastrointestinal:  continue feedings/current rate, hold feedings


Endocrine:  monitor blood sugar


Hematologic:  transfuse if hgb<8.5


Neurologic:  PRN Ativan, PRN Morphine, keep patient comfortable


Affect:  PRN ativan


Prophylaxis:  Protonix


Time Spent (Minutes):  40


Notes Reviewed:  internist, cardio


Discussed with:  nurses, consultants, 





Critical Care - Objective





Last 24 Hour Vital Signs








  Date Time  Temp Pulse Resp B/P (MAP) Pulse Ox O2 Delivery O2 Flow Rate FiO2


 


9/16/20 07:00  46 25 128/55 (79) 100   


 


9/16/20 06:00  55 21 129/60 (83) 100   


 


9/16/20 05:00  63 24 100/61 (74) 100   


 


9/16/20 04:00 98.7 57 26 102/49 (66) 98   


 


9/16/20 04:00      Mechanical Ventilator  





      Mechanical Ventilator  


 


9/16/20 04:00        100


 


9/16/20 04:00  57      


 


9/16/20 03:00  59 17 123/56 (78) 93   


 


9/16/20 02:43  60 26     100


 


9/16/20 02:30  63 22 117/61 (79) 99   


 


9/16/20 02:18  65 25 147/120 (129) 98   


 


9/16/20 02:00  56 23 147/135 (139) 99   


 


9/16/20 01:00  53 21 146/90 (108) 100   


 


9/16/20 00:00      Mechanical Ventilator  





      Mechanical Ventilator  


 


9/16/20 00:00 98.4 55 26 129/62 (84) 99   


 


9/16/20 00:00        100


 


9/16/20 00:00  55      


 


9/15/20 23:30  54 26     100


 


9/15/20 23:00  57 21 126/88 (101) 99   


 


9/15/20 22:00  48 26 123/67 (85) 100   


 


9/15/20 21:00  50 26 135/79 (97) 97   


 


9/15/20 20:00        100


 


9/15/20 20:00 98.6 61 21 115/97 (103) 99   


 


9/15/20 20:00      Mechanical Ventilator  





      Mechanical Ventilator  


 


9/15/20 19:30  58 26     100


 


9/15/20 19:30  60      


 


9/15/20 19:00  60 20 120/72 (88) 100   


 


9/15/20 18:00  58 26 118/168 (152) 100   


 


9/15/20 17:00  58 26 116/84 (95) 100   


 


9/15/20 16:00  62      


 


9/15/20 16:00  62 23 126/95 (105) 100   


 


9/15/20 16:00        60


 


9/15/20 16:00      Mechanical Ventilator  





      Mechanical Ventilator  


 


9/15/20 15:29  45 26     100


 


9/15/20 15:00  54 26 132/90 (104) 97   


 


9/15/20 14:00  47 26 107/57 (74) 96   


 


9/15/20 13:00  62 22 111/59 (76) 95   


 


9/15/20 12:31  70 26     60


 


9/15/20 12:00 98.4 62 18 115/65 (82) 96   


 


9/15/20 12:00        60


 


9/15/20 12:00      Mechanical Ventilator  





      Mechanical Ventilator  


 


9/15/20 12:00  62 18 115/65 (82) 96   


 


9/15/20 12:00  63      


 


9/15/20 11:00  61 23 137/62 (87) 90   


 


9/15/20 10:50    160/99    


 


9/15/20 10:00  52 26 160/99 (119) 93   


 


9/15/20 09:00  57 23 138/118 (125) 96   








Status:  awake, sedated


Condition:  critical


HEENT:  atraumatic


Neck:  full ROM


Lungs:  clear


Heart:  HR/BP stable


Abdomen:  soft, active bowel sounds


Extremities:  no C/C/E


Decubiti:  location


Micro:





Microbiology








 Date/Time


Source Procedure


Growth Status





 


 9/14/20 18:05


Stool Clostridium difficile Toxin Assay - Final Complete








Accucheck:  131





Critical Care - Subjective


ROS Limited/Unobtainable:  Yes


Condition:  critical


EKG Rhythm:  Sinus Rhythm


FI02:  100


Vent Support Breath Rate:  26


Vent Support Mode:  AC


Vent Tidal Volume:  500


Sputum Amount:  Small


PEEP:  10.0


PIP:  51


Tube Feeding Amount:  60


I&O:











Intake and Output  


 


 9/15/20 9/16/20





 19:00 07:00


 


Intake Total 946.66 ml 880 ml


 


Output Total 1120 ml 800 ml


 


Balance -173.34 ml 80 ml


 


  


 


Free Water 100 ml 


 


IV Total 126.66 ml 


 


Tube Feeding 720 ml 720 ml


 


Other  160 ml


 


Output Urine Total 1020 ml 700 ml


 


Stool Total 100 ml 100 ml








ET-Tube:  7.5


ET Position:  22


Labs:





Laboratory Tests








Test


 9/16/20


03:10


 


White Blood Count


 15.3 K/UL


(4.8-10.8)  H


 


Red Blood Count


 2.45 M/UL


(4.20-5.40)  L


 


Hemoglobin


 7.9 G/DL


(12.0-16.0)  L


 


Hematocrit


 24.3 %


(37.0-47.0)  L


 


Mean Corpuscular Volume 99 FL (80-99)  


 


Mean Corpuscular Hemoglobin


 32.4 PG


(27.0-31.0)  H


 


Mean Corpuscular Hemoglobin


Concent 32.7 G/DL


(32.0-36.0)


 


Red Cell Distribution Width


 14.8 %


(11.6-14.8)


 


Platelet Count


 140 K/UL


(150-450)  L


 


Mean Platelet Volume


 9.4 FL


(6.5-10.1)


 


Neutrophils (%) (Auto)


 % (45.0-75.0)





 


Lymphocytes (%) (Auto)


 % (20.0-45.0)





 


Monocytes (%) (Auto)  % (1.0-10.0)  


 


Eosinophils (%) (Auto)  % (0.0-3.0)  


 


Basophils (%) (Auto)  % (0.0-2.0)  


 


Neutrophils % (Manual) Pending  


 


Lymphocytes % (Manual) Pending  


 


Platelet Estimate Pending  


 


Platelet Morphology Pending  


 


Sodium Level


 153 MMOL/L


(136-145)  H


 


Potassium Level


 3.1 MMOL/L


(3.5-5.1)  L


 


Chloride Level


 116 MMOL/L


()  H


 


Carbon Dioxide Level


 28 MMOL/L


(21-32)


 


Anion Gap


 9 mmol/L


(5-15)


 


Blood Urea Nitrogen


 30 mg/dL


(7-18)  H


 


Creatinine


 0.7 MG/DL


(0.55-1.30)


 


Estimat Glomerular Filtration


Rate > 60 mL/min


(>60)


 


Glucose Level


 246 MG/DL


()  H


 


Calcium Level


 7.4 MG/DL


(8.5-10.1)  L


 


Phosphorus Level


 1.8 MG/DL


(2.5-4.9)  L


 


Magnesium Level


 1.6 MG/DL


(1.8-2.4)  L


 


Total Bilirubin


 0.5 MG/DL


(0.2-1.0)


 


Aspartate Amino Transf


(AST/SGOT) 29 U/L (15-37)





 


Alanine Aminotransferase


(ALT/SGPT) 69 U/L (12-78)





 


Alkaline Phosphatase


 55 U/L


()


 


Total Protein


 4.7 G/DL


(6.4-8.2)  L


 


Albumin


 1.3 G/DL


(3.4-5.0)  L


 


Globulin 3.4 g/dL  


 


Albumin/Globulin Ratio


 0.4 (1.0-2.7)


L


 


Valproic Acid (Depakene) Level Pending  

















Chano Cherry MD              Sep 16, 2020 08:50

## 2020-09-16 NOTE — NUR
NURSE HAND-OFF REPORT: 



Latest Vital Signs: Temperature 97.1 , Pulse 69 , B/P 105 /57 , Respiratory Rate 22 , O2 SAT 
94 , Mechanical Ventilator, O2 Flow Rate 15.0 .  

Vital Sign Comment: 



EKG Rhythm: Sinus Bradycardia

Rhythm change?: N 

MD Notified?: -

MD Response: 



Latest Momin Fall Score: 70  

Fall Risk: High Risk 

Safety Measures: Call light Within Reach, Bed Alarm Zone 1, Side Rails Side Rails x3, Bed 
position Low and Locked.

Fall Precautions: 

Yellow Socks

Door Sign

Patient Fall Education



Report given to Nirav .

## 2020-09-16 NOTE — CARDIOLOGY PROGRESS NOTE
Assessment/Plan


Assessment/Plan


sepsis


respiratory failure 


ards 


renal insuf 


bacteremia


abn cardiac enzyme due to demand 


sinus arnold stable 


thrombocytopenia resolved  


hypernatremia 











vent support 


abx 


wbc higher 


na increased still 


3rd spacing alot 


cxr from 9/14 appears improved to my reading and with radiologsit reading as 

well 


tsh seems fine 


sig edema with hypernatremia need nauteresis eventually 


remains off pressor still at this time 


hr inthe 40's-60's no sig pauses on tele slower heart rate occur during sleep 

per rn 


tele personally reviewed 


hopefully will be able to wean soon





Subjective


ROS Limited/Unobtainable:  Yes


Subjective


on  a vent not communicative but look awake





Objective





Last 24 Hour Vital Signs








  Date Time  Temp Pulse Resp B/P (MAP) Pulse Ox O2 Delivery O2 Flow Rate FiO2


 


9/16/20 18:00  57 23 146/57 (86) 100   


 


9/16/20 17:00  46 26 123/70 (87) 98   


 


9/16/20 16:00        85


 


9/16/20 16:00      Mechanical Ventilator  





      Mechanical Ventilator  


 


9/16/20 16:00  50 26 116/56 (76) 98   


 


9/16/20 16:00  62      


 


9/16/20 15:05  58 26     85


 


9/16/20 15:00  56 26 117/75 (89) 99   


 


9/16/20 14:00  58 25 108/64 (79) 100   


 


9/16/20 13:00  50 24 117/54 (75) 98   


 


9/16/20 12:00        100


 


9/16/20 12:00      Mechanical Ventilator  





      Mechanical Ventilator  


 


9/16/20 12:00  44      


 


9/16/20 12:00 97.1 45 26 98/51 (67) 99   


 


9/16/20 11:00  46 26 127/54 (78) 99   


 


9/16/20 11:00  45 26     100


 


9/16/20 10:00  47 26 125/84 (98) 100   


 


9/16/20 09:00  55 24 118/59 (78) 99   


 


9/16/20 08:00      Mechanical Ventilator  





      Mechanical Ventilator  


 


9/16/20 08:00        100


 


9/16/20 08:00 98.8 55 23 122/107 (112) 99   


 


9/16/20 07:41  49      


 


9/16/20 07:05  46 27     100


 


9/16/20 07:00  46 25 128/55 (79) 100   


 


9/16/20 06:00  55 21 129/60 (83) 100   


 


9/16/20 05:00  63 24 100/61 (74) 100   


 


9/16/20 04:00 98.7 57 26 102/49 (66) 98   


 


9/16/20 04:00      Mechanical Ventilator  





      Mechanical Ventilator  


 


9/16/20 04:00        100


 


9/16/20 04:00  57      


 


9/16/20 03:00  59 17 123/56 (78) 93   


 


9/16/20 02:43  60 26     100


 


9/16/20 02:30  63 22 117/61 (79) 99   


 


9/16/20 02:18  65 25 147/120 (129) 98   


 


9/16/20 02:00  56 23 147/135 (139) 99   


 


9/16/20 01:00  53 21 146/90 (108) 100   


 


9/16/20 00:00      Mechanical Ventilator  





      Mechanical Ventilator  


 


9/16/20 00:00 98.4 55 26 129/62 (84) 99   


 


9/16/20 00:00        100


 


9/16/20 00:00  55      


 


9/15/20 23:30  54 26     100


 


9/15/20 23:00  57 21 126/88 (101) 99   


 


9/15/20 22:00  48 26 123/67 (85) 100   


 


9/15/20 21:00  50 26 135/79 (97) 97   


 


9/15/20 20:00        100


 


9/15/20 20:00 98.6 61 21 115/97 (103) 99   


 


9/15/20 20:00      Mechanical Ventilator  





      Mechanical Ventilator  


 


9/15/20 19:30  58 26     100


 


9/15/20 19:30  60      


 


9/15/20 19:00  60 20 120/72 (88) 100   








General Appearance:  no apparent distress, on vent, patient on isolation, 

isolation precautions


Cardiovascular:  normal rate, bradycardia


Respiratory/Chest:  lungs clear


Abdomen:  normal bowel sounds, non tender, soft


Extremities:  no swelling











Intake and Output  


 


 9/15/20 9/16/20





 19:00 07:00


 


Intake Total 946.66 ml 880 ml


 


Output Total 1120 ml 800 ml


 


Balance -173.34 ml 80 ml


 


  


 


Free Water 100 ml 


 


IV Total 126.66 ml 


 


Tube Feeding 720 ml 720 ml


 


Other  160 ml


 


Output Urine Total 1020 ml 700 ml


 


Stool Total 100 ml 100 ml











Laboratory Tests








Test


 9/16/20


03:10


 


White Blood Count


 15.3 K/UL


(4.8-10.8)  H


 


Red Blood Count


 2.45 M/UL


(4.20-5.40)  L


 


Hemoglobin


 7.9 G/DL


(12.0-16.0)  L


 


Hematocrit


 24.3 %


(37.0-47.0)  L


 


Mean Corpuscular Volume 99 FL (80-99)  


 


Mean Corpuscular Hemoglobin


 32.4 PG


(27.0-31.0)  H


 


Mean Corpuscular Hemoglobin


Concent 32.7 G/DL


(32.0-36.0)


 


Red Cell Distribution Width


 14.8 %


(11.6-14.8)


 


Platelet Count


 140 K/UL


(150-450)  L


 


Mean Platelet Volume


 9.4 FL


(6.5-10.1)


 


Neutrophils (%) (Auto)


 % (45.0-75.0)





 


Lymphocytes (%) (Auto)


 % (20.0-45.0)





 


Monocytes (%) (Auto)  % (1.0-10.0)  


 


Eosinophils (%) (Auto)  % (0.0-3.0)  


 


Basophils (%) (Auto)  % (0.0-2.0)  


 


Differential Total Cells


Counted 100  





 


Neutrophils % (Manual) 88 % (45-75)  H


 


Lymphocytes % (Manual) 5 % (20-45)  L


 


Monocytes % (Manual) 7 % (1-10)  


 


Eosinophils % (Manual) 0 % (0-3)  


 


Basophils % (Manual) 0 % (0-2)  


 


Band Neutrophils 0 % (0-8)  


 


Platelet Estimate Decreased  L


 


Platelet Morphology Normal  


 


Hypochromasia 3+  


 


Anisocytosis 1+  


 


Sodium Level


 153 MMOL/L


(136-145)  H


 


Potassium Level


 3.1 MMOL/L


(3.5-5.1)  L


 


Chloride Level


 116 MMOL/L


()  H


 


Carbon Dioxide Level


 28 MMOL/L


(21-32)


 


Anion Gap


 9 mmol/L


(5-15)


 


Blood Urea Nitrogen


 30 mg/dL


(7-18)  H


 


Creatinine


 0.7 MG/DL


(0.55-1.30)


 


Estimat Glomerular Filtration


Rate > 60 mL/min


(>60)


 


Glucose Level


 246 MG/DL


()  H


 


Calcium Level


 7.4 MG/DL


(8.5-10.1)  L


 


Phosphorus Level


 1.8 MG/DL


(2.5-4.9)  L


 


Magnesium Level


 1.6 MG/DL


(1.8-2.4)  L


 


Total Bilirubin


 0.5 MG/DL


(0.2-1.0)


 


Aspartate Amino Transf


(AST/SGOT) 29 U/L (15-37)





 


Alanine Aminotransferase


(ALT/SGPT) 69 U/L (12-78)





 


Alkaline Phosphatase


 55 U/L


()


 


Total Protein


 4.7 G/DL


(6.4-8.2)  L


 


Albumin


 1.3 G/DL


(3.4-5.0)  L


 


Globulin 3.4 g/dL  


 


Albumin/Globulin Ratio


 0.4 (1.0-2.7)


L


 


Valproic Acid (Depakene) Level


 21 MCG/ML


()  L











Microbiology








 Date/Time


Source Procedure


Growth Status





 


 9/14/20 18:05


Stool Clostridium difficile Toxin Assay - Final Complete

















Constantine Jorgensen MD          Sep 16, 2020 18:41

## 2020-09-16 NOTE — NUR
NURSE NOTES:

PATIENT AWOKE, OPEN EYES, ABLE TO EYE CONTACT BUT DID NOT FOLLOW COMMANDS, ON ETT TO VENT, 
AC 26//FIO2 85%/PEEP 10, O2 SATURATION OVER 98% NOTED, HR 60'S/MIN, SR NOTED, G TUBE 
INTACT AND PATENT, ONGOING VITAL AF 1.2 AT 60ML/HR, RESIDUE 20ML NOTED, KEPT HOB OVER 30 
DEGREES, ASPIRATION AND SZ PRECAUTION, ABDOMEN SOFT, NON TENDER, RECTAL TUBE INTACT AND 
PATENT, BROWN COLOR STOOL OUTED, F/C INTACT AND PATENT, MARY COLOR URINE OUTED, PICC LINE 
TO LEFT UPPER ARM, INTACT AND PATENT, ON P200 BED, MADE LOWER BED POSITION, ON BED ALARM AND 
LOCKED, PLACE CALL LIGHT WITHIN REACH, WILL CONTINUE TO MONITOR.

## 2020-09-16 NOTE — NUR
NURSE NOTES:



Report received from Nirav Joy RN. Patient is awake and resting in bed. Able to make eye 
contacts. Oriented to name. Non-verbal. Unable to follow commands. Afebrile. SB on cardiac 
monitor with HR 57-58. ETT 7.5/22 cm at lip line. AC 26, , FiO2 100%, P 10. O2 sat 
93-96%. White and clear thin secretion from mouth noted and suctioned. GT in place, 
receiving Vital AF 1.2 at 60cc/hr. 20cc residual noted. Pitting +2 edema on bilateral upper 
extremitas and feet noted. Non-pitting edema on bilateral legs. Valle in place draining to 
gravity. CARLOS PICC line patent and asymptomatic, open for TKO. Bed in lowest position. Side 
rails up x3. Will resume plan of care.

## 2020-09-16 NOTE — NUR
NURSE NOTES:

Patient provided with oral suctioning. Tolerated well. HOB remains elevated for aspiration 
precaution.

## 2020-09-16 NOTE — INFECTIOUS DISEASES PROG NOTE
Assessment/Plan








ASSESSMENT:


sp code blue 8/26





Septic Shock; SP


Fever, recurrent- SP


Leukocytosis; persistent/fluctuating


   -9/9 u/a no pyuria


   -9/8 Bcx NTD (Picc line)


   -9/6 Bcx NTD


            ucx Neg


             sp cx C. parapsilopsis


  -8/26 u/a no pyuria





Pneumonia.- COVID 19 neg x3


   Acute hypoxic resp failure on VM> NRB 15l 100%; hypoxic on ABG> now VDRF 

8/26- Fio2 80% >100% 8/28> 60% 9/1 >80% 9/2   >95% 9/10 >90% 9/14 >60% 9/15


      -9/14 CXR: Improved aeration of both lungs.


       -9/5 CXR:Small bilateral pleural effusions with minor edema.  Stable 

edema with  mild worsening in the degree of pleural effusion on the right.


         -9/1 sp cx Neg


         8/31 CXR: Extensive bilateral interstitial and airspace disease appears

similar to the prior exam. Moderate to large bilateral pleural effusions appear 

unchanged.


   8/28 CXR: Increasing left upper lobe dense consolidation and likely 

increasing bilateral pleural fluid. Persistent diffuse dense consolidation 

elsewhere


            -8/26 sp cx normal resp lilliam


             -8/25 CXR: Increased atelectasis of the right lung, since prior 

exam of 3 days earlier. New or increased right pleural effusion. Increased left 

basilar consolidation and/or pleural fluid 


          -COVID Rapid PCR neg 8/23, 8/23, 8/26


          -8/22 spc x Group G strep


          -8/22 CXR:   Reduced lung volumes.  Patchy bilateral predominantly 

interstitial  pulmonary opacities.  Could be from edema and/or pneumonia.  There

is a broader differential.


 


        -legionella ag urine, blasto ab, Histo ab, HIV ab screen, JOY, ANCA neg





Persistent, high grade bacteremia-


     -8/22 Bcx 4/4 sets S. haemolyticus; 8/23 Bcx 3/4 S/ epi; 8/27 Bcx 1.4 S. 

warnerri; 8/29 Bcx Neg


     -2d echo: no vegetaions seen





          ua/ wbc 10-15, nit neg, leuk +1; ucx Neg





DAVID; 


 -supratherapeutic vanco levels





-Seizure disorder.


- Hypothyroidism.


- Down syndrome.





History of PEG tube placement.


[facility] resident





PLAN:





Continue to monitor off abx


          9/14 SP Meropenem #18, IV AMikacin #7


          9/4/20 SP Daptomycin #11


          9/2 SP MIcafungin #7, Linezolid #5


   8/28 SP Azithromycin #7/7


   8/26 SP Ceftriaxone #2


   8/25 SP IV Vancomycin #4, Zosyn #4


   8/22 SP Cefepime x1, Flagyl x1





- Monitor CBC, BMP.


.f/u  Repeat cx


- COVID neg x3


- Monitor chest x-ray.


- Monitor the patient's clinical course and labs.  Based on those, we will do 

further recommendation.


-f/u Bcx from picc line, cdiff if diarrhea


-f/u Fungitell, asp ag, flow cytometry


-Once stable, CT chest/abd/pw/ given persistent leukocytosis


-poor prognosis





Thank you, Dr. Cherry, for allowing me to participate in the care of


this patient.  I will follow the patient with you at this


hospitalization.








Discussed with RN





Subjective


Allergies:  


Coded Allergies:  


     No Known Allergies (Unverified , 1/8/19)


afebrile


wbc improving


remains on 100% FIo2





Objective





Last 24 Hour Vital Signs








  Date Time  Temp Pulse Resp B/P (MAP) Pulse Ox O2 Delivery O2 Flow Rate FiO2


 


9/16/20 11:00  46 26 127/54 (78) 99   


 


9/16/20 11:00  45 26     100


 


9/16/20 10:00  47 26 125/84 (98) 100   


 


9/16/20 09:00  55 24 118/59 (78) 99   


 


9/16/20 08:00      Mechanical Ventilator  





      Mechanical Ventilator  


 


9/16/20 08:00        100


 


9/16/20 08:00 98.8 55 23 122/107 (112) 99   


 


9/16/20 07:41  49      


 


9/16/20 07:05  46 27     100


 


9/16/20 07:00  46 25 128/55 (79) 100   


 


9/16/20 06:00  55 21 129/60 (83) 100   


 


9/16/20 05:00  63 24 100/61 (74) 100   


 


9/16/20 04:00 98.7 57 26 102/49 (66) 98   


 


9/16/20 04:00      Mechanical Ventilator  





      Mechanical Ventilator  


 


9/16/20 04:00        100


 


9/16/20 04:00  57      


 


9/16/20 03:00  59 17 123/56 (78) 93   


 


9/16/20 02:43  60 26     100


 


9/16/20 02:30  63 22 117/61 (79) 99   


 


9/16/20 02:18  65 25 147/120 (129) 98   


 


9/16/20 02:00  56 23 147/135 (139) 99   


 


9/16/20 01:00  53 21 146/90 (108) 100   


 


9/16/20 00:00      Mechanical Ventilator  





      Mechanical Ventilator  


 


9/16/20 00:00 98.4 55 26 129/62 (84) 99   


 


9/16/20 00:00        100


 


9/16/20 00:00  55      


 


9/15/20 23:30  54 26     100


 


9/15/20 23:00  57 21 126/88 (101) 99   


 


9/15/20 22:00  48 26 123/67 (85) 100   


 


9/15/20 21:00  50 26 135/79 (97) 97   


 


9/15/20 20:00        100


 


9/15/20 20:00 98.6 61 21 115/97 (103) 99   


 


9/15/20 20:00      Mechanical Ventilator  





      Mechanical Ventilator  


 


9/15/20 19:30  58 26     100


 


9/15/20 19:30  60      


 


9/15/20 19:00  60 20 120/72 (88) 100   


 


9/15/20 18:00  58 26 118/168 (152) 100   


 


9/15/20 17:00  58 26 116/84 (95) 100   


 


9/15/20 16:00  62      


 


9/15/20 16:00  62 23 126/95 (105) 100   


 


9/15/20 16:00        60


 


9/15/20 16:00      Mechanical Ventilator  





      Mechanical Ventilator  


 


9/15/20 15:29  45 26     100


 


9/15/20 15:00  54 26 132/90 (104) 97   


 


9/15/20 14:00  47 26 107/57 (74) 96   


 


9/15/20 13:00  62 22 111/59 (76) 95   


 


9/15/20 12:31  70 26     60


 


9/15/20 12:00 98.4 62 18 115/65 (82) 96   


 


9/15/20 12:00        60


 


9/15/20 12:00      Mechanical Ventilator  





      Mechanical Ventilator  


 


9/15/20 12:00  62 18 115/65 (82) 96   


 


9/15/20 12:00  63      








Height (Feet):  5


Height (Inches):  3.00


Weight (Pounds):  172


HEENT:  No pale conjunctivae.  No icterus. ETT in place


NECK:  No lymphadenopathy.


CHEST:  Coarse breathing sounds.


HEART:  S1 and S2.


ABDOMEN:  Soft.  PEG tube in place.


EXTREMITIES:  No cyanosis at this time .


SKIN: no rash





Microbiology








 Date/Time


Source Procedure


Growth Status





 


 9/14/20 18:05


Stool Clostridium difficile Toxin Assay - Final Complete











Laboratory Tests








Test


 9/16/20


03:10


 


White Blood Count


 15.3 K/UL


(4.8-10.8)  H


 


Red Blood Count


 2.45 M/UL


(4.20-5.40)  L


 


Hemoglobin


 7.9 G/DL


(12.0-16.0)  L


 


Hematocrit


 24.3 %


(37.0-47.0)  L


 


Mean Corpuscular Volume 99 FL (80-99)  


 


Mean Corpuscular Hemoglobin


 32.4 PG


(27.0-31.0)  H


 


Mean Corpuscular Hemoglobin


Concent 32.7 G/DL


(32.0-36.0)


 


Red Cell Distribution Width


 14.8 %


(11.6-14.8)


 


Platelet Count


 140 K/UL


(150-450)  L


 


Mean Platelet Volume


 9.4 FL


(6.5-10.1)


 


Neutrophils (%) (Auto)


 % (45.0-75.0)





 


Lymphocytes (%) (Auto)


 % (20.0-45.0)





 


Monocytes (%) (Auto)  % (1.0-10.0)  


 


Eosinophils (%) (Auto)  % (0.0-3.0)  


 


Basophils (%) (Auto)  % (0.0-2.0)  


 


Differential Total Cells


Counted 100  





 


Neutrophils % (Manual) 88 % (45-75)  H


 


Lymphocytes % (Manual) 5 % (20-45)  L


 


Monocytes % (Manual) 7 % (1-10)  


 


Eosinophils % (Manual) 0 % (0-3)  


 


Basophils % (Manual) 0 % (0-2)  


 


Band Neutrophils 0 % (0-8)  


 


Platelet Estimate Decreased  L


 


Platelet Morphology Normal  


 


Hypochromasia 3+  


 


Anisocytosis 1+  


 


Sodium Level


 153 MMOL/L


(136-145)  H


 


Potassium Level


 3.1 MMOL/L


(3.5-5.1)  L


 


Chloride Level


 116 MMOL/L


()  H


 


Carbon Dioxide Level


 28 MMOL/L


(21-32)


 


Anion Gap


 9 mmol/L


(5-15)


 


Blood Urea Nitrogen


 30 mg/dL


(7-18)  H


 


Creatinine


 0.7 MG/DL


(0.55-1.30)


 


Estimat Glomerular Filtration


Rate > 60 mL/min


(>60)


 


Glucose Level


 246 MG/DL


()  H


 


Calcium Level


 7.4 MG/DL


(8.5-10.1)  L


 


Phosphorus Level


 1.8 MG/DL


(2.5-4.9)  L


 


Magnesium Level


 1.6 MG/DL


(1.8-2.4)  L


 


Total Bilirubin


 0.5 MG/DL


(0.2-1.0)


 


Aspartate Amino Transf


(AST/SGOT) 29 U/L (15-37)





 


Alanine Aminotransferase


(ALT/SGPT) 69 U/L (12-78)





 


Alkaline Phosphatase


 55 U/L


()


 


Total Protein


 4.7 G/DL


(6.4-8.2)  L


 


Albumin


 1.3 G/DL


(3.4-5.0)  L


 


Globulin 3.4 g/dL  


 


Albumin/Globulin Ratio


 0.4 (1.0-2.7)


L


 


Valproic Acid (Depakene) Level


 21 MCG/ML


()  L











Current Medications








 Medications


  (Trade)  Dose


 Ordered  Sig/Didi


 Route


 PRN Reason  Start Time


 Stop Time Status Last Admin


Dose Admin


 


 Acetaminophen


  (Tylenol)  650 mg  Q4H  PRN


 GT


 FEVER  8/26/20 11:45


 9/25/20 11:44  9/7/20 03:17





 


 Chlorhexidine


 Gluconate


  (Beatrice-Hex 2%)  1 applic  DAILY@2000


 TOPIC


   8/31/20 20:00


 11/29/20 19:59  9/15/20 19:38





 


 Clotrimazole


  (Lotrimin)  1 applic  Q12HR


 TOPIC


   8/30/20 13:00


 11/28/20 12:59  9/16/20 08:19





 


 Dextrose


  (Dextrose 50%)  25 ml  Q30M  PRN


 IV


 Hypoglycemia  8/26/20 11:30


 11/20/20 11:29   





 


 Dextrose


  (Dextrose 50%)  50 ml  Q30M  PRN


 IV


 Hypoglycemia  8/26/20 11:30


 11/20/20 11:29   





 


 Dopamine HCl/


 Dextrose  250 ml @ 0


 mls/hr  Q24H


 IV


   9/4/20 11:15


 12/3/20 11:14  9/6/20 19:47





 


 Hydrocortisone


  (Solu-CORTEF)  100 mg  EVERY 8  HOURS


 IV


   8/30/20 14:00


 11/28/20 13:59  9/16/20 05:31





 


 Levothyroxine


 Sodium


  (Synthroid)  75 mcg  DAILY


 GT


   8/27/20 09:00


 9/22/20 08:59  9/16/20 08:19





 


 Loperamide HCl


  (Imodium)  2 mg  Q6H  PRN


 NG


 Diarrhea  9/16/20 10:30


 10/16/20 10:29   





 


 Midodrine


  (Pro-Amatine)  10 mg  Q8HR


 GT


   8/28/20 14:00


 11/26/20 13:59  9/16/20 05:32





 


 Norepinephrine


 Bitartrate 16 mg/


 Dextrose  500 ml @ 0


 mls/hr  Q24H


 IV


   8/31/20 07:45


 9/29/20 12:59  9/4/20 08:43





 


 Ondansetron HCl


  (Zofran)  4 mg  Q6H  PRN


 IVP


 Nausea & Vomiting  8/26/20 12:00


 9/21/20 11:59   





 


 Pantoprazole


  (Protonix)  40 mg  DAILY


 IV


   8/27/20 09:00


 9/22/20 08:59  9/16/20 09:58





 


 Phenylephrine HCl


 50 mg/Dextrose  250 ml @ 0


 mls/hr  Q24H  PRN


 IV


 For hypotension  8/29/20 17:45


 9/28/20 17:44  8/31/20 01:49





 


 Polyethylene


 Glycol


  (Miralax)  17 gm  DAILYPRN  PRN


 GT


 Constipation  8/26/20 12:00


 9/21/20 11:59   





 


 Valproic Acid


  (Depakene)  500 mg  EVERY 8  HOURS


 GT


   8/26/20 14:00


 9/21/20 21:59  9/16/20 05:32




















Rema Gomes M.D.            Sep 16, 2020 11:49

## 2020-09-16 NOTE — NUR
NURSE NOTES:

PATIENT ASLEEP STATUS, VSS, HR 50'S/MIN SB NOTED AT THIS TIME, WILL CONTINUE TO MONITOR.

## 2020-09-17 VITALS — DIASTOLIC BLOOD PRESSURE: 77 MMHG | SYSTOLIC BLOOD PRESSURE: 111 MMHG

## 2020-09-17 VITALS — SYSTOLIC BLOOD PRESSURE: 121 MMHG | DIASTOLIC BLOOD PRESSURE: 59 MMHG

## 2020-09-17 VITALS — DIASTOLIC BLOOD PRESSURE: 51 MMHG | SYSTOLIC BLOOD PRESSURE: 99 MMHG

## 2020-09-17 VITALS — SYSTOLIC BLOOD PRESSURE: 108 MMHG | DIASTOLIC BLOOD PRESSURE: 50 MMHG

## 2020-09-17 VITALS — DIASTOLIC BLOOD PRESSURE: 57 MMHG | SYSTOLIC BLOOD PRESSURE: 143 MMHG

## 2020-09-17 VITALS — DIASTOLIC BLOOD PRESSURE: 67 MMHG | SYSTOLIC BLOOD PRESSURE: 106 MMHG

## 2020-09-17 VITALS — SYSTOLIC BLOOD PRESSURE: 129 MMHG | DIASTOLIC BLOOD PRESSURE: 58 MMHG

## 2020-09-17 VITALS — DIASTOLIC BLOOD PRESSURE: 54 MMHG | SYSTOLIC BLOOD PRESSURE: 116 MMHG

## 2020-09-17 VITALS — SYSTOLIC BLOOD PRESSURE: 116 MMHG | DIASTOLIC BLOOD PRESSURE: 70 MMHG

## 2020-09-17 VITALS — DIASTOLIC BLOOD PRESSURE: 71 MMHG | SYSTOLIC BLOOD PRESSURE: 125 MMHG

## 2020-09-17 VITALS — SYSTOLIC BLOOD PRESSURE: 121 MMHG | DIASTOLIC BLOOD PRESSURE: 66 MMHG

## 2020-09-17 VITALS — DIASTOLIC BLOOD PRESSURE: 45 MMHG | SYSTOLIC BLOOD PRESSURE: 121 MMHG

## 2020-09-17 VITALS — DIASTOLIC BLOOD PRESSURE: 69 MMHG | SYSTOLIC BLOOD PRESSURE: 119 MMHG

## 2020-09-17 VITALS — DIASTOLIC BLOOD PRESSURE: 69 MMHG | SYSTOLIC BLOOD PRESSURE: 129 MMHG

## 2020-09-17 VITALS — SYSTOLIC BLOOD PRESSURE: 102 MMHG | DIASTOLIC BLOOD PRESSURE: 66 MMHG

## 2020-09-17 VITALS — SYSTOLIC BLOOD PRESSURE: 115 MMHG | DIASTOLIC BLOOD PRESSURE: 59 MMHG

## 2020-09-17 VITALS — SYSTOLIC BLOOD PRESSURE: 131 MMHG | DIASTOLIC BLOOD PRESSURE: 56 MMHG

## 2020-09-17 VITALS — SYSTOLIC BLOOD PRESSURE: 131 MMHG | DIASTOLIC BLOOD PRESSURE: 74 MMHG

## 2020-09-17 VITALS — DIASTOLIC BLOOD PRESSURE: 51 MMHG | SYSTOLIC BLOOD PRESSURE: 123 MMHG

## 2020-09-17 VITALS — DIASTOLIC BLOOD PRESSURE: 81 MMHG | SYSTOLIC BLOOD PRESSURE: 124 MMHG

## 2020-09-17 VITALS — SYSTOLIC BLOOD PRESSURE: 116 MMHG | DIASTOLIC BLOOD PRESSURE: 55 MMHG

## 2020-09-17 VITALS — DIASTOLIC BLOOD PRESSURE: 62 MMHG | SYSTOLIC BLOOD PRESSURE: 130 MMHG

## 2020-09-17 VITALS — DIASTOLIC BLOOD PRESSURE: 68 MMHG | SYSTOLIC BLOOD PRESSURE: 126 MMHG

## 2020-09-17 VITALS — SYSTOLIC BLOOD PRESSURE: 122 MMHG | DIASTOLIC BLOOD PRESSURE: 84 MMHG

## 2020-09-17 LAB
ADD MANUAL DIFF: YES
ALBUMIN SERPL-MCNC: 1.3 G/DL (ref 3.4–5)
ALBUMIN/GLOB SERPL: 0.4 {RATIO} (ref 1–2.7)
ALP SERPL-CCNC: 56 U/L (ref 46–116)
ALT SERPL-CCNC: 59 U/L (ref 12–78)
ANION GAP SERPL CALC-SCNC: 10 MMOL/L (ref 5–15)
AST SERPL-CCNC: 20 U/L (ref 15–37)
BILIRUB SERPL-MCNC: 0.5 MG/DL (ref 0.2–1)
BUN SERPL-MCNC: 30 MG/DL (ref 7–18)
CALCIUM SERPL-MCNC: 7.3 MG/DL (ref 8.5–10.1)
CHLORIDE SERPL-SCNC: 114 MMOL/L (ref 98–107)
CO2 SERPL-SCNC: 26 MMOL/L (ref 21–32)
CREAT SERPL-MCNC: 0.7 MG/DL (ref 0.55–1.3)
ERYTHROCYTE [DISTWIDTH] IN BLOOD BY AUTOMATED COUNT: 14.7 % (ref 11.6–14.8)
GLOBULIN SER-MCNC: 3.6 G/DL
HCT VFR BLD CALC: 23.9 % (ref 37–47)
HGB BLD-MCNC: 8 G/DL (ref 12–16)
MCV RBC AUTO: 98 FL (ref 80–99)
PHOSPHATE SERPL-MCNC: 2.2 MG/DL (ref 2.5–4.9)
PLATELET # BLD: 126 K/UL (ref 150–450)
POTASSIUM SERPL-SCNC: 2.8 MMOL/L (ref 3.5–5.1)
RBC # BLD AUTO: 2.45 M/UL (ref 4.2–5.4)
SODIUM SERPL-SCNC: 149 MMOL/L (ref 136–145)
WBC # BLD AUTO: 15.7 K/UL (ref 4.8–10.8)

## 2020-09-17 RX ADMIN — HYDROCORTISONE SODIUM SUCCINATE SCH MG: 100 INJECTION, POWDER, FOR SOLUTION INTRAMUSCULAR; INTRAVENOUS at 05:34

## 2020-09-17 RX ADMIN — MIDODRINE HYDROCHLORIDE SCH MG: 10 TABLET ORAL at 21:36

## 2020-09-17 RX ADMIN — VALPROIC ACID SCH MG: 250 SOLUTION ORAL at 21:36

## 2020-09-17 RX ADMIN — HYDROCORTISONE SODIUM SUCCINATE SCH MG: 100 INJECTION, POWDER, FOR SOLUTION INTRAMUSCULAR; INTRAVENOUS at 21:36

## 2020-09-17 RX ADMIN — VALPROIC ACID SCH MG: 250 SOLUTION ORAL at 13:27

## 2020-09-17 RX ADMIN — SODIUM CHLORIDE SCH MLS/HR: 900 INJECTION, SOLUTION INTRAVENOUS at 09:28

## 2020-09-17 RX ADMIN — SODIUM CHLORIDE SCH MLS/HR: 900 INJECTION, SOLUTION INTRAVENOUS at 13:28

## 2020-09-17 RX ADMIN — CHLORHEXIDINE GLUCONATE SCH APPLIC: 213 SOLUTION TOPICAL at 20:53

## 2020-09-17 RX ADMIN — HYDROCORTISONE SODIUM SUCCINATE SCH MG: 100 INJECTION, POWDER, FOR SOLUTION INTRAMUSCULAR; INTRAVENOUS at 13:27

## 2020-09-17 RX ADMIN — DOPAMINE HYDROCHLORIDE IN DEXTROSE SCH MLS/HR: 1.6 INJECTION, SOLUTION INTRAVENOUS at 10:43

## 2020-09-17 RX ADMIN — MIDODRINE HYDROCHLORIDE SCH MG: 10 TABLET ORAL at 13:27

## 2020-09-17 RX ADMIN — PANTOPRAZOLE SODIUM SCH MG: 40 INJECTION, POWDER, FOR SOLUTION INTRAVENOUS at 08:25

## 2020-09-17 RX ADMIN — MIDODRINE HYDROCHLORIDE SCH MG: 10 TABLET ORAL at 05:34

## 2020-09-17 RX ADMIN — SODIUM CHLORIDE SCH MLS/HR: 900 INJECTION, SOLUTION INTRAVENOUS at 07:45

## 2020-09-17 RX ADMIN — VALPROIC ACID SCH MG: 250 SOLUTION ORAL at 05:33

## 2020-09-17 NOTE — NUR
NURSE NOTES:

Suctioned tn tk whitish  secretions  moderate in amt. Turned pt only from back to left side 
per Dr oJ Ann ragsdale.

## 2020-09-17 NOTE — NEPHROLOGY PROGRESS NOTE
Assessment/Plan


Problem List:  


(1) DAVID (acute kidney injury)


(2) Respiratory failure requiring intubation


(3) Down's syndrome


(4) Seizure disorder


(5) Hypothyroidism


Assessment





Acute renal failure, likely due to hypotension


Acute respiratory distress, hypoxia


Seizure disorder


Hypothyroidism


Down syndrome


Full code











Fluid challenge with IV fluids and albumin


Midodrine for BP above 100 systolic


Check TSH level


Check


Correct level


Monitor renal parameters


Urine studies


Per orders


Plan


September 17: Labs reviewed.  Potassium, phosphorus, hemoglobin, are all low.  

Potassium and phosphorus IV replacement given.  Continue to monitor electrolytes

and CBC.  Patient remains full code.


September 16: Lab reviewed.  Low phosphorus low magnesium and low potassium was 

addressed.  Hemoglobin drifting lower.  Continue per consultants.  Patient 

remains full code.


September 15: Labs reviewed.  Patient continues to be on ventilator.  D5W for 

high sodium and also potassium chloride intravenously as supplement given.  

Hemoglobin 8.4.  Continue to monitor electrolytes and renal parameters.


September 14: Labs reviewed.  Low potassium and high sodium noted.  Hemoglobin 

8.1 stable.  Aim to correct abnormal electrolyte.  Continue rest.  Will give 2 

boluses of D5W 500 cc.


September 13: Lab reviewed.  Abnormal electrolytes noted and addressed.


September 12: Labs reviewed.  Potassium supplement given.  Patient remains full 

code.  Continue per consultants.


September 11: Lab reviewed.  Electrolyte abnormalities addressed.  Continue per 

pulmonary and ID.


September 10: Lab reviewed.  Status unchanged.  Serum sodium 151 unchanged.  

Stable from renal standpoint of view.


September 9: Labs reviewed.  Status quo.  D5W 500 cc IV ordered.  Continue to 

monitor renal parameters.


September 8: Status quo.  Labs reviewed.  Overall condition unchanged.  Patient 

was transfused and hemoglobin higher.  Continue current management.  Patient 

remains full code.


September 7: Status quo.  Overall condition poor.  Very low albumin.  Edematous.

 Hypotensive.  Hemoglobin lower.  Anemia work-up ordered.  I favor transfusion 2

units of packed RBCs.  Patient remains full code.  I favor supportive care only.

 Will discuss.


September 6: Electrolyte abnormalities addressed.  Serum creatinine lower.  

Continue per current management.


September 5: Status unchanged.  Lab reviewed.  Serum potassium 2.7.  IV 

potassium chloride ordered.  Serum creatinine low at 1.6 stable.  Blood pressure

90s systolic


September 4: Status quo.  Labs reviewed.  Renal parameters stable.  Serum 

creatinine down to 1.6.  Medication list reviewed.  Continues to be on 

midodrine.  Continue per consultants.


September 3: Status quo.  Labs reviewed.  Electrolytes adjusted.  Serum creat

inine down to 1.8.  Continue per consultants.


September 2: Status quo.  Labs reviewed.  Phosphorus supplement IV given.  Serum

creatinine 2.  Continue per consultants.


September 1: Requires less pressors.  Albumin bolus given.  1 dose of Lasix IV 

ordered as the patient severely edematous.  Patient serum albumin is very low.  

Continue per consultants.


August 31: Continues to be intubated.  Labs reviewed.  Serum creatinine 1.9 

unchanged.  Blood pressure more stable.  Off 1 of the pressors.  Continue to 

monitor renal parameters.  Continue per consultants.  Patient now on 

hydrocortisone 100 mg every 8 hours.  Will decrease IV fluid.  Normal saline 

down to 50 cc an hour.


August 30: Intubated.  Labs reviewed.  Creatinine 1.9 unchanged.  Continue same 

treatment plan.  Per consultants.  Overall poor prognosis since the patient 

remains on pressors and her pulmonary status is worsening.


August 29: Remains intubated.  Labs reviewed.  Creatinine 1.9.  Blood pressure 

systolic 90s.  Continue per consultants.


August 28: Remains intubated.  Labs reviewed.  Serum creatinine lower to 2.  

Vancomycin level lower.  Remains hypotensive on pressors.  Will increase 

midodrine to 10 mg every 8 hours.  Continue per consultants.  Continue to 

monitor renal parameters.


August 27: Patient now in ICU.  Intubated.  On pressors.  Labs reviewed.  Will 

increase midodrine.  Aim to keep blood pressure over 100 systolic.  Will give 

albumin bolus.  Will check vancomycin level which was elevated when checked 

previously on August 24.  Will monitor renal parameters.  Continue per 

consultants.





Subjective


ROS Limited/Unobtainable:  Yes





Objective


Objective





Last 24 Hour Vital Signs








  Date Time  Temp Pulse Resp B/P (MAP) Pulse Ox O2 Delivery O2 Flow Rate FiO2


 


9/17/20 14:00  61 25 116/70 (85) 99   


 


9/17/20 14:00  61 21 116/70 (85) 98   


 


9/17/20 13:00  50 26 129/58 (81) 98   


 


9/17/20 12:00      Mechanical Ventilator  





      Mechanical Ventilator  


 


9/17/20 12:00        85


 


9/17/20 12:00 98.7 50 23 143/57 (85) 98   


 


9/17/20 11:39  49      


 


9/17/20 11:00  48 26 121/45 (70) 97   


 


9/17/20 10:43    115/59    


 


9/17/20 10:38  55 26     85


 


9/17/20 10:00  48 26 115/59 (77) 98   


 


9/17/20 09:00  48 26 131/56 (81) 99   


 


9/17/20 08:07  59      


 


9/17/20 08:00        85


 


9/17/20 08:00 99.2 66 21 124/81 (95) 97   


 


9/17/20 08:00      Mechanical Ventilator  





      Mechanical Ventilator  


 


9/17/20 07:45    124/81    


 


9/17/20 07:09  50 26     85


 


9/17/20 07:00  60 24 106/67 (80) 99   


 


9/17/20 06:00  67 21 129/69 (89) 99   


 


9/17/20 05:00  65 22 126/68 (87) 100   


 


9/17/20 04:00  67      


 


9/17/20 04:00        85


 


9/17/20 04:00      Mechanical Ventilator  





      Mechanical Ventilator  


 


9/17/20 04:00 98.4 67 26 111/77 (88) 100   


 


9/17/20 03:20  78 26     85


 


9/17/20 03:00  73 20 102/66 (78) 99   


 


9/17/20 02:00  66 24 121/66 (84) 100   


 


9/17/20 01:00  63 21 121/59 (79) 100   


 


9/17/20 00:00        85


 


9/17/20 00:00 98.4 60 22 116/55 (75) 100   


 


9/17/20 00:00      Mechanical Ventilator  





      Mechanical Ventilator  


 


9/16/20 23:58  59      


 


9/16/20 23:00  65 21 131/77 (95) 100   


 


9/16/20 22:50  48 26     85


 


9/16/20 22:00  61 22 107/75 (86) 100   


 


9/16/20 21:00  57 23 105/54 (71) 100   


 


9/16/20 20:00        85


 


9/16/20 20:00      Mechanical Ventilator  





      Mechanical Ventilator  


 


9/16/20 20:00 98.4 58 19 97/54 (68) 100   


 


9/16/20 19:32  59      


 


9/16/20 19:00  69 22 105/57 (73) 94   


 


9/16/20 18:51  55 26     85


 


9/16/20 18:00  57 23 146/57 (86) 100   


 


9/16/20 17:00  46 26 123/70 (87) 98   


 


9/16/20 16:00        85


 


9/16/20 16:00      Mechanical Ventilator  





      Mechanical Ventilator  


 


9/16/20 16:00  50 26 116/56 (76) 98   


 


9/16/20 16:00  62      


 


9/16/20 15:05  58 26     85


 


9/16/20 15:00  56 26 117/75 (89) 99   

















Intake and Output  


 


 9/16/20 9/17/20





 19:00 07:00


 


Intake Total 1241.6667 ml 880 ml


 


Output Total 845 ml 1000 ml


 


Balance 396.6667 ml -120 ml


 


  


 


Free Water 60 ml 


 


IV Total 581.6667 ml 


 


Tube Feeding 600 ml 720 ml


 


Other  160 ml


 


Output Urine Total 825 ml 1000 ml


 


Stool Total 20 ml 0 ml


 


# Bowel Movements  1








Laboratory Tests


9/17/20 03:50: 


White Blood Count 15.7H, Red Blood Count 2.45L, Hemoglobin 8.0L, Hematocrit 

23.9L, Mean Corpuscular Volume 98, Mean Corpuscular Hemoglobin 32.8H, Mean 

Corpuscular Hemoglobin Concent 33.6, Red Cell Distribution Width 14.7, Platelet 

Count 126L, Mean Platelet Volume 9.0, Neutrophils (%) (Auto) , Lymphocytes (%) 

(Auto) , Monocytes (%) (Auto) , Eosinophils (%) (Auto) , Basophils (%) (Auto) , 

Differential Total Cells Counted 100, Neutrophils % (Manual) 94H, Lymphocytes % 

(Manual) 3L, Monocytes % (Manual) 3, Eosinophils % (Manual) 0, Basophils % 

(Manual) 0, Band Neutrophils 0, Platelet Estimate DecreasedL, Platelet 

Morphology Normal, Hypochromasia 2+, Anisocytosis 1+, Sodium Level 149H, 

Potassium Level 2.8L, Chloride Level 114H, Carbon Dioxide Level 26, Anion Gap 

10, Blood Urea Nitrogen 30H, Creatinine 0.7, Estimat Glomerular Filtration Rate 

> 60, Glucose Level 252H, Calcium Level 7.3L, Phosphorus Level 2.2L, Magnesium 

Level 2.0, Total Bilirubin 0.5, Aspartate Amino Transf (AST/SGOT) 20, Alanine 

Aminotransferase (ALT/SGPT) 59, Alkaline Phosphatase 56, Total Protein 4.9L, 

Albumin 1.3L, Globulin 3.6, Albumin/Globulin Ratio 0.4L


9/17/20 08:05: 


Arterial Blood pH 7.558*H, Arterial Blood Partial Pressure CO2 33.5L, Arterial 

Blood Partial Pressure O2 87.5, Arterial Blood HCO3 29.2H, Arterial Blood Oxygen

Saturation 96.2, Arterial Blood Base Excess 6.6H, Cole Test Positive


Height (Feet):  5


Height (Inches):  3.00


Weight (Pounds):  172


General Appearance:  no apparent distress, lethargic


EENT:  other - On ventilator


Cardiovascular:  tachycardia


Respiratory/Chest:  decreased breath sounds


Abdomen:  distended











Lam Nino MD            Sep 17, 2020 14:46

## 2020-09-17 NOTE — NUR
NURSE NOTES:

Received patient from Nirav RN. Patient is awake, alert and oriented x2. Sinus Rhythm on the 
heart monitor, HR 66. Receiving oxygen via ET Tube 7.5 22cm at the lip line, vent settings: 
AC 26, , FiO2 85%, PEEP 10. IV site is Left Upper Arm PICC patent and intact. G-tube 
is intact and receiving Vital AF at 60cc/hr. Valle catheter is intact and draining, rectal 
tube is also intact and draining. Bed is locked, placed in lowest position, side rails up 
x3, bed alarm on, head of bed elevated, call light within reach. Will continue to monitor.

## 2020-09-17 NOTE — NUR
NURSE NOTES:

Bed bath given to patient, turned and repositioned. Sacral wound dressing replaced. Rectal 
tube was found out of place, approximately 100ml of liquid stool was on bed linen. 
Reinserted rectal tube and it is draining properly.

## 2020-09-17 NOTE — NUR
NURSE HAND-OFF REPORT: 



Latest Vital Signs: Temperature 98.4 , Pulse 67 , B/P 129 /69 , Respiratory Rate 21 , O2 SAT 
99 , Mechanical Ventilator, O2 Flow Rate 15.0 .  

Vital Sign Comment: 



EKG Rhythm: Sinus Rhythm

Rhythm change?: N 

MD Notified?: -

MD Response: 



Latest Momin Fall Score: 70  

Fall Risk: High Risk 

Safety Measures: Call light Within Reach, Bed Alarm Zone 1, Side Rails Side Rails x3, Bed 
position Low and Locked.

Fall Precautions: 

Yellow Socks

Door Sign

Patient Fall Education



Report given to LAYTON DE LA FUENTE.

## 2020-09-17 NOTE — NUR
NURSE NOTES:

Endotracheal suctioning done on patient, thin clear copious amounts of sputum removed. 
Turned and repositioned patient, oral care given. Will continue to monitor.

## 2020-09-17 NOTE — NUR
NURSE NOTES:

Received pt, with Dx PNA, , a down syndrome, AO x2, SB-SR on the monitor, Bp 
stable,afebrile. With fio2 85% 02 sat 98%, suctioned lg amt of whitish secretions orally and 
moderate amt of beige secretions through ETT.Pt was orally intubated on ac mode. Tolerated 
GT fdg Vital  AF at 60ml/hr with acceptable residuals. Rectal tube to gravity with moderate 
amt of brownish liquid stools. Valle to gravity with yellowish urine approx. 50-100ml/hr pt 
with sacral and perinneal area redness, On [id number].Turned q 2hrs prn with good skin 
care done. Pt turned only on his back and left side per Dr Ferguson order due to CXR result. 
Will continue to monitor.

## 2020-09-17 NOTE — INFECTIOUS DISEASES PROG NOTE
Assessment/Plan








ASSESSMENT:


sp code blue 8/26





Septic Shock; SP


Fever, recurrent- SP


Leukocytosis; persistent/fluctuating


   -9/9 u/a no pyuria


   -9/8 Bcx NTD (Picc line)


   -9/6 Bcx NTD


            ucx Neg


             sp cx C. parapsilopsis


  -8/26 u/a no pyuria





Pneumonia.- COVID 19 neg x3


   Acute hypoxic resp failure on VM> NRB 15l 100%; hypoxic on ABG> now VDRF 

8/26- Fio2 80% >100% 8/28> 60% 9/1 >80% 9/2   >95% 9/10 >90% 9/14 >60% 9/15


      -9/14 CXR: Improved aeration of both lungs.


       -9/5 CXR:Small bilateral pleural effusions with minor edema.  Stable 

edema with  mild worsening in the degree of pleural effusion on the right.


         -9/1 sp cx Neg


         8/31 CXR: Extensive bilateral interstitial and airspace disease appears

similar to the prior exam. Moderate to large bilateral pleural effusions appear 

unchanged.


   8/28 CXR: Increasing left upper lobe dense consolidation and likely 

increasing bilateral pleural fluid. Persistent diffuse dense consolidation 

elsewhere


            -8/26 sp cx normal resp lilliam


             -8/25 CXR: Increased atelectasis of the right lung, since prior 

exam of 3 days earlier. New or increased right pleural effusion. Increased left 

basilar consolidation and/or pleural fluid 


          -COVID Rapid PCR neg 8/23, 8/23, 8/26


          -8/22 spc x Group G strep


          -8/22 CXR:   Reduced lung volumes.  Patchy bilateral predominantly 

interstitial  pulmonary opacities.  Could be from edema and/or pneumonia.  There

is a broader differential.


 


        -legionella ag urine, blasto ab, Histo ab, HIV ab screen, JOY, ANCA neg





Persistent, high grade bacteremia-


     -8/22 Bcx 4/4 sets S. haemolyticus; 8/23 Bcx 3/4 S/ epi; 8/27 Bcx 1.4 S. 

warnerri; 8/29 Bcx Neg


     -2d echo: no vegetaions seen





          ua/ wbc 10-15, nit neg, leuk +1; ucx Neg





DAVID; 


 -supratherapeutic vanco levels





-Seizure disorder.


- Hypothyroidism.


- Down syndrome.





History of PEG tube placement.


[facility] resident





PLAN:





Continue to monitor off abx


          9/14 SP Meropenem #18, IV AMikacin #7


          9/4/20 SP Daptomycin #11


          9/2 SP MIcafungin #7, Linezolid #5


   8/28 SP Azithromycin #7/7


   8/26 SP Ceftriaxone #2


   8/25 SP IV Vancomycin #4, Zosyn #4


   8/22 SP Cefepime x1, Flagyl x1





- Monitor CBC, BMP.


.f/u  Repeat cx


- COVID neg x3


- Monitor chest x-ray.


- Monitor the patient's clinical course and labs.  Based on those, we will do 

further recommendation.


-f/u Bcx from picc line, cdiff if diarrhea


-f/u Fungitell, asp ag, flow cytometry


-Once stable, CT chest/abd/pw/ given persistent leukocytosis


-poor prognosis





Thank you, Dr. Cherry, for allowing me to participate in the care of


this patient.  I will follow the patient with you at this


hospitalization.








Discussed with RN





Subjective


Allergies:  


Coded Allergies:  


     No Known Allergies (Unverified , 1/8/19)


afebrile


wbc remains at 15


Fio2 85%


off abx





Objective





Last 24 Hour Vital Signs








  Date Time  Temp Pulse Resp B/P (MAP) Pulse Ox O2 Delivery O2 Flow Rate FiO2


 


9/17/20 08:00        85


 


9/17/20 08:00 99.2 66 21 124/81 (95) 97   


 


9/17/20 08:00      Mechanical Ventilator  





      Mechanical Ventilator  


 


9/17/20 07:45    124/81    


 


9/17/20 07:09  50 26     85


 


9/17/20 07:00  60 24 106/67 (80) 99   


 


9/17/20 06:00  67 21 129/69 (89) 99   


 


9/17/20 05:00  65 22 126/68 (87) 100   


 


9/17/20 04:00  67      


 


9/17/20 04:00        85


 


9/17/20 04:00      Mechanical Ventilator  





      Mechanical Ventilator  


 


9/17/20 04:00 98.4 67 26 111/77 (88) 100   


 


9/17/20 03:20  78 26     85


 


9/17/20 03:00  73 20 102/66 (78) 99   


 


9/17/20 02:00  66 24 121/66 (84) 100   


 


9/17/20 01:00  63 21 121/59 (79) 100   


 


9/17/20 00:00        85


 


9/17/20 00:00 98.4 60 22 116/55 (75) 100   


 


9/17/20 00:00      Mechanical Ventilator  





      Mechanical Ventilator  


 


9/16/20 23:58  59      


 


9/16/20 23:00  65 21 131/77 (95) 100   


 


9/16/20 22:50  48 26     85


 


9/16/20 22:00  61 22 107/75 (86) 100   


 


9/16/20 21:00  57 23 105/54 (71) 100   


 


9/16/20 20:00        85


 


9/16/20 20:00      Mechanical Ventilator  





      Mechanical Ventilator  


 


9/16/20 20:00 98.4 58 19 97/54 (68) 100   


 


9/16/20 19:32  59      


 


9/16/20 19:00  69 22 105/57 (73) 94   


 


9/16/20 18:51  55 26     85


 


9/16/20 18:00  57 23 146/57 (86) 100   


 


9/16/20 17:00  46 26 123/70 (87) 98   


 


9/16/20 16:00        85


 


9/16/20 16:00      Mechanical Ventilator  





      Mechanical Ventilator  


 


9/16/20 16:00  50 26 116/56 (76) 98   


 


9/16/20 16:00  62      


 


9/16/20 15:05  58 26     85


 


9/16/20 15:00  56 26 117/75 (89) 99   


 


9/16/20 14:00  58 25 108/64 (79) 100   


 


9/16/20 13:00  50 24 117/54 (75) 98   


 


9/16/20 12:00        100


 


9/16/20 12:00      Mechanical Ventilator  





      Mechanical Ventilator  


 


9/16/20 12:00  44      


 


9/16/20 12:00 97.1 45 26 98/51 (67) 99   


 


9/16/20 11:00  46 26 127/54 (78) 99   


 


9/16/20 11:00  45 26     100








Height (Feet):  5


Height (Inches):  3.00


Weight (Pounds):  172


HEENT:  No pale conjunctivae.  No icterus. ETT in place


NECK:  No lymphadenopathy.


CHEST:  Coarse breathing sounds.


HEART:  S1 and S2.


ABDOMEN:  Soft.  PEG tube in place.


EXTREMITIES:  No cyanosis at this time .


SKIN: no rash





Microbiology








 Date/Time


Source Procedure


Growth Status





 


 9/14/20 18:05


Stool Clostridium difficile Toxin Assay - Final Complete











Laboratory Tests








Test


 9/17/20


03:50 9/17/20


08:05


 


White Blood Count


 15.7 K/UL


(4.8-10.8)  H 





 


Red Blood Count


 2.45 M/UL


(4.20-5.40)  L 





 


Hemoglobin


 8.0 G/DL


(12.0-16.0)  L 





 


Hematocrit


 23.9 %


(37.0-47.0)  L 





 


Mean Corpuscular Volume 98 FL (80-99)   


 


Mean Corpuscular Hemoglobin


 32.8 PG


(27.0-31.0)  H 





 


Mean Corpuscular Hemoglobin


Concent 33.6 G/DL


(32.0-36.0) 





 


Red Cell Distribution Width


 14.7 %


(11.6-14.8) 





 


Platelet Count


 126 K/UL


(150-450)  L 





 


Mean Platelet Volume


 9.0 FL


(6.5-10.1) 





 


Neutrophils (%) (Auto)


 % (45.0-75.0)


 





 


Lymphocytes (%) (Auto)


 % (20.0-45.0)


 





 


Monocytes (%) (Auto)  % (1.0-10.0)   


 


Eosinophils (%) (Auto)  % (0.0-3.0)   


 


Basophils (%) (Auto)  % (0.0-2.0)   


 


Neutrophils % (Manual) Pending   


 


Lymphocytes % (Manual) Pending   


 


Platelet Estimate Pending   


 


Platelet Morphology Pending   


 


Sodium Level


 149 MMOL/L


(136-145)  H 





 


Potassium Level


 2.8 MMOL/L


(3.5-5.1)  L 





 


Chloride Level


 114 MMOL/L


()  H 





 


Carbon Dioxide Level


 26 MMOL/L


(21-32) 





 


Anion Gap


 10 mmol/L


(5-15) 





 


Blood Urea Nitrogen


 30 mg/dL


(7-18)  H 





 


Creatinine


 0.7 MG/DL


(0.55-1.30) 





 


Estimat Glomerular Filtration


Rate > 60 mL/min


(>60) 





 


Glucose Level


 252 MG/DL


()  H 





 


Calcium Level


 7.3 MG/DL


(8.5-10.1)  L 





 


Phosphorus Level


 2.2 MG/DL


(2.5-4.9)  L 





 


Magnesium Level


 2.0 MG/DL


(1.8-2.4) 





 


Total Bilirubin


 0.5 MG/DL


(0.2-1.0) 





 


Aspartate Amino Transf


(AST/SGOT) 20 U/L (15-37)


 





 


Alanine Aminotransferase


(ALT/SGPT) 59 U/L (12-78)


 





 


Alkaline Phosphatase


 56 U/L


() 





 


Total Protein


 4.9 G/DL


(6.4-8.2)  L 





 


Albumin


 1.3 G/DL


(3.4-5.0)  L 





 


Globulin 3.6 g/dL   


 


Albumin/Globulin Ratio


 0.4 (1.0-2.7)


L 





 


Arterial Blood pH


 


 7.558


(7.350-7.450)


 


Arterial Blood Partial


Pressure CO2 


 33.5 mmHg


(35.0-45.0)  L


 


Arterial Blood Partial


Pressure O2 


 87.5 mmHg


(75.0-100.0)


 


Arterial Blood HCO3


 


 29.2 mmol/L


(22.0-26.0)  H


 


Arterial Blood Oxygen


Saturation 


 96.2 %


()


 


Arterial Blood Base Excess  6.6 (-2-2)  H


 


Cole Test  Positive  











Current Medications








 Medications


  (Trade)  Dose


 Ordered  Sig/Didi


 Route


 PRN Reason  Start Time


 Stop Time Status Last Admin


Dose Admin


 


 Acetaminophen


  (Tylenol)  650 mg  Q4H  PRN


 GT


 FEVER  8/26/20 11:45


 9/25/20 11:44  9/7/20 03:17





 


 Chlorhexidine


 Gluconate


  (Beatrice-Hex 2%)  1 applic  DAILY@2000


 TOPIC


   8/31/20 20:00


 11/29/20 19:59  9/16/20 19:31





 


 Clotrimazole


  (Lotrimin)  1 applic  Q12HR


 TOPIC


   8/30/20 13:00


 11/28/20 12:59  9/17/20 08:25





 


 Dextrose


  (Dextrose 50%)  25 ml  Q30M  PRN


 IV


 Hypoglycemia  8/26/20 11:30


 11/20/20 11:29   





 


 Dextrose


  (Dextrose 50%)  50 ml  Q30M  PRN


 IV


 Hypoglycemia  8/26/20 11:30


 11/20/20 11:29   





 


 Dopamine HCl/


 Dextrose  250 ml @ 0


 mls/hr  Q24H


 IV


   9/4/20 11:15


 12/3/20 11:14  9/6/20 19:47





 


 Hydrocortisone


  (Solu-CORTEF)  100 mg  EVERY 8  HOURS


 IV


   8/30/20 14:00


 11/28/20 13:59  9/17/20 05:34





 


 Levothyroxine


 Sodium


  (Synthroid)  75 mcg  DAILY


 GT


   8/27/20 09:00


 9/22/20 08:59  9/17/20 08:25





 


 Loperamide HCl


  (Imodium)  2 mg  Q6H  PRN


 NG


 Diarrhea  9/16/20 10:30


 10/16/20 10:29   





 


 Midodrine


  (Pro-Amatine)  10 mg  Q8HR


 GT


   8/28/20 14:00


 11/26/20 13:59  9/17/20 05:34





 


 Norepinephrine


 Bitartrate 16 mg/


 Dextrose  500 ml @ 0


 mls/hr  Q24H


 IV


   8/31/20 07:45


 9/29/20 12:59  9/4/20 08:43





 


 Ondansetron HCl


  (Zofran)  4 mg  Q6H  PRN


 IVP


 Nausea & Vomiting  8/26/20 12:00


 9/21/20 11:59   





 


 Pantoprazole


  (Protonix)  40 mg  DAILY


 IV


   8/27/20 09:00


 9/22/20 08:59  9/17/20 08:25





 


 Phenylephrine HCl


 50 mg/Dextrose  250 ml @ 0


 mls/hr  Q24H  PRN


 IV


 For hypotension  8/29/20 17:45


 9/28/20 17:44  8/31/20 01:49





 


 Polyethylene


 Glycol


  (Miralax)  17 gm  DAILYPRN  PRN


 GT


 Constipation  8/26/20 12:00


 9/21/20 11:59   





 


 Potassium


 Phosphate 15 mm/


 Sodium Chloride  280 ml @ 


 70 mls/hr  Q4H


 IV


   9/17/20 09:00


 9/17/20 16:59  9/17/20 09:28





 


 Potassium Chloride  100 ml @ 


 50 mls/hr  EVERY 2  HOURS


 IVPB


   9/17/20 08:00


 9/17/20 15:59  9/17/20 08:25





 


 Valproic Acid


  (Depakene)  500 mg  EVERY 8  HOURS


 GT


   8/26/20 14:00


 9/21/20 21:59  9/17/20 05:33




















Rema Gomes M.D.            Sep 17, 2020 10:19

## 2020-09-17 NOTE — NUR
NURSE NOTES:

Medications given as prescribed, no adverse reaction noted. Turned and repositioned patient. 
Pain assessment patient's FLACC score was 0. Patient is afebrile, will continue to monitor.

## 2020-09-17 NOTE — NUR
NURSE NOTES:

MORNING CARE AND ORAL CARE WAS DONE, RECTAL TUBE LEAKED, FIXED RECTAL TUBE, WILL CONTINUE TO 
MONITOR.

## 2020-09-17 NOTE — DIAGNOSTIC IMAGING REPORT
Indication: Dyspnea

 

Technique: Portable AP view of the chest

 

Comparison: 9/14/2020

 

Findings: Endotracheal tube and left arm PICC line remain in place, satisfactory

position. Heart size appears stable. There is persistent interstitial and bilateral

alveolar opacities. There is slight interval worsening of aeration particularly left

mid and upper lung. No appreciable pneumothorax. Small left pleural effusion not

excluded. Osseous structures grossly stable.

 

IMPRESSION: Extensive interstitial and bilateral alveolar opacities, with interval

worsening of aeration of the left mid and upper lung compared to the prior exam.

 

Endotracheal tube and left arm PICC line remain in place.

## 2020-09-17 NOTE — NUR
NURSE HAND-OFF REPORT: 



Latest Vital Signs: Temperature 99.0 , Pulse 65 , B/P 122 /84 , Respiratory Rate 20 , O2 SAT 
100 , Mechanical Ventilator, O2 Flow Rate 15.0 .  

Vital Sign Comment: 



EKG Rhythm: Sinus Rhythm

Rhythm change?: N 

MD Notified?: -

MD Response: 



Latest Momin Fall Score: 70  

Fall Risk: High Risk 

Safety Measures: Call light Within Reach, Bed Alarm Zone 1, Side Rails Side Rails x3, Bed 
position Low and Locked.

Fall Precautions: 

Yellow Socks

Door Sign

Patient Fall Education



Report given to Lucie TRAYLOR.

## 2020-09-17 NOTE — GENERAL PROGRESS NOTE
Subjective


ROS Limited/Unobtainable:  No


Allergies:  


Coded Allergies:  


     No Known Allergies (Unverified , 1/8/19)





Objective





Last 24 Hour Vital Signs








  Date Time  Temp Pulse Resp B/P (MAP) Pulse Ox O2 Delivery O2 Flow Rate FiO2


 


9/17/20 14:00  61 25 116/70 (85) 99   


 


9/17/20 14:00  61 21 116/70 (85) 98   


 


9/17/20 13:00  50 26 129/58 (81) 98   


 


9/17/20 12:00      Mechanical Ventilator  





      Mechanical Ventilator  


 


9/17/20 12:00        85


 


9/17/20 12:00 98.7 50 23 143/57 (85) 98   


 


9/17/20 11:39  49      


 


9/17/20 11:00  48 26 121/45 (70) 97   


 


9/17/20 10:43    115/59    


 


9/17/20 10:38  55 26     85


 


9/17/20 10:00  48 26 115/59 (77) 98   


 


9/17/20 09:00  48 26 131/56 (81) 99   


 


9/17/20 08:07  59      


 


9/17/20 08:00        85


 


9/17/20 08:00 99.2 66 21 124/81 (95) 97   


 


9/17/20 08:00      Mechanical Ventilator  





      Mechanical Ventilator  


 


9/17/20 07:45    124/81    


 


9/17/20 07:09  50 26     85


 


9/17/20 07:00  60 24 106/67 (80) 99   


 


9/17/20 06:00  67 21 129/69 (89) 99   


 


9/17/20 05:00  65 22 126/68 (87) 100   


 


9/17/20 04:00  67      


 


9/17/20 04:00        85


 


9/17/20 04:00      Mechanical Ventilator  





      Mechanical Ventilator  


 


9/17/20 04:00 98.4 67 26 111/77 (88) 100   


 


9/17/20 03:20  78 26     85


 


9/17/20 03:00  73 20 102/66 (78) 99   


 


9/17/20 02:00  66 24 121/66 (84) 100   


 


9/17/20 01:00  63 21 121/59 (79) 100   


 


9/17/20 00:00        85


 


9/17/20 00:00 98.4 60 22 116/55 (75) 100   


 


9/17/20 00:00      Mechanical Ventilator  





      Mechanical Ventilator  


 


9/16/20 23:58  59      


 


9/16/20 23:00  65 21 131/77 (95) 100   


 


9/16/20 22:50  48 26     85


 


9/16/20 22:00  61 22 107/75 (86) 100   


 


9/16/20 21:00  57 23 105/54 (71) 100   


 


9/16/20 20:00        85


 


9/16/20 20:00      Mechanical Ventilator  





      Mechanical Ventilator  


 


9/16/20 20:00 98.4 58 19 97/54 (68) 100   


 


9/16/20 19:32  59      


 


9/16/20 19:00  69 22 105/57 (73) 94   


 


9/16/20 18:51  55 26     85


 


9/16/20 18:00  57 23 146/57 (86) 100   


 


9/16/20 17:00  46 26 123/70 (87) 98   


 


9/16/20 16:00        85


 


9/16/20 16:00      Mechanical Ventilator  





      Mechanical Ventilator  


 


9/16/20 16:00  50 26 116/56 (76) 98   


 


9/16/20 16:00  62      


 


9/16/20 15:05  58 26     85


 


9/16/20 15:00  56 26 117/75 (89) 99   

















Intake and Output  


 


 9/16/20 9/17/20





 19:00 07:00


 


Intake Total 1241.6667 ml 880 ml


 


Output Total 845 ml 1000 ml


 


Balance 396.6667 ml -120 ml


 


  


 


Free Water 60 ml 


 


IV Total 581.6667 ml 


 


Tube Feeding 600 ml 720 ml


 


Other  160 ml


 


Output Urine Total 825 ml 1000 ml


 


Stool Total 20 ml 0 ml


 


# Bowel Movements  1








Laboratory Tests


9/17/20 03:50: 


White Blood Count 15.7H, Red Blood Count 2.45L, Hemoglobin 8.0L, Hematocrit 

23.9L, Mean Corpuscular Volume 98, Mean Corpuscular Hemoglobin 32.8H, Mean 

Corpuscular Hemoglobin Concent 33.6, Red Cell Distribution Width 14.7, Platelet 

Count 126L, Mean Platelet Volume 9.0, Neutrophils (%) (Auto) , Lymphocytes (%) 

(Auto) , Monocytes (%) (Auto) , Eosinophils (%) (Auto) , Basophils (%) (Auto) , 

Differential Total Cells Counted 100, Neutrophils % (Manual) 94H, Lymphocytes % 

(Manual) 3L, Monocytes % (Manual) 3, Eosinophils % (Manual) 0, Basophils % 

(Manual) 0, Band Neutrophils 0, Platelet Estimate DecreasedL, Platelet 

Morphology Normal, Hypochromasia 2+, Anisocytosis 1+, Sodium Level 149H, 

Potassium Level 2.8L, Chloride Level 114H, Carbon Dioxide Level 26, Anion Gap 

10, Blood Urea Nitrogen 30H, Creatinine 0.7, Estimat Glomerular Filtration Rate 

> 60, Glucose Level 252H, Calcium Level 7.3L, Phosphorus Level 2.2L, Magnesium 

Level 2.0, Total Bilirubin 0.5, Aspartate Amino Transf (AST/SGOT) 20, Alanine 

Aminotransferase (ALT/SGPT) 59, Alkaline Phosphatase 56, Total Protein 4.9L, 

Albumin 1.3L, Globulin 3.6, Albumin/Globulin Ratio 0.4L


9/17/20 08:05: 


Arterial Blood pH 7.558*H, Arterial Blood Partial Pressure CO2 33.5L, Arterial 

Blood Partial Pressure O2 87.5, Arterial Blood HCO3 29.2H, Arterial Blood Oxygen

Saturation 96.2, Arterial Blood Base Excess 6.6H, Cole Test Positive


Height (Feet):  5


Height (Inches):  3.00


Weight (Pounds):  172


General Appearance:  no apparent distress


EENT:  normal ENT inspection


Neck:  supple


Cardiovascular:  normal rate


Respiratory/Chest:  decreased breath sounds


Abdomen:  normal bowel sounds, non tender, soft


Extremities:  non-tender





Assessment/Plan


Assessment/Plan:


1. History of Down syndrome.


2. Dysphagia with G-tube.


3. Seizure disorder.


4. Hypothyroidism.


5. DAVID.


6. Pneumonia.


7. Sepsis.








fu H&H


ppi


GTF


hold GI procedures for now


check C.diff


stool ob + 1/2











Ted Nagy MD             Sep 17, 2020 14:47

## 2020-09-17 NOTE — CARDIOLOGY PROGRESS NOTE
Assessment/Plan


Assessment/Plan


sepsis


respiratory failure 


ards 


renal insuf 


bacteremia


abn cardiac enzyme due to demand 


sinus arnold stable 


thrombocytopenia resolved  


hypernatremia 











vent support 


abx 


wbc higher 


na increased still but i mproved  


3rd spacing alot 


cxr worsse 


tsh seems fine 


sig edema with hypernatremia need nauteresis eventually 


remains off pressor still at this time 


hr inthe 40's-60's no sig pauses on tele slower heart rate occur during sleep 

per rn 


tele personally reviewed





Subjective


ROS Limited/Unobtainable:  Yes


Subjective


on  a vent not communicative but look awake





Objective





Last 24 Hour Vital Signs








  Date Time  Temp Pulse Resp B/P (MAP) Pulse Ox O2 Delivery O2 Flow Rate FiO2


 


9/17/20 19:15  67 26     85


 


9/17/20 19:00  65 20 122/84 (97) 100   


 


9/17/20 18:00  66 18 119/69 (86) 100   


 


9/17/20 17:00  58 26 116/54 (74) 97   


 


9/17/20 16:00  74 34 131/74 (93) 96   


 


9/17/20 16:00        85


 


9/17/20 16:00 99.0 74 34 131/74 (93) 96   


 


9/17/20 16:00      Mechanical Ventilator  





      Mechanical Ventilator  


 


9/17/20 16:00  74      


 


9/17/20 15:30  74 26     85


 


9/17/20 15:00  62 21 130/62 (84) 99   


 


9/17/20 14:00  61 25 116/70 (85) 99   


 


9/17/20 14:00  61 21 116/70 (85) 98   


 


9/17/20 13:00  50 26 129/58 (81) 98   


 


9/17/20 12:00      Mechanical Ventilator  





      Mechanical Ventilator  


 


9/17/20 12:00        85


 


9/17/20 12:00 98.7 50 23 143/57 (85) 98   


 


9/17/20 11:39  49      


 


9/17/20 11:00  48 26 121/45 (70) 97   


 


9/17/20 10:43    115/59    


 


9/17/20 10:38  55 26     85


 


9/17/20 10:00  48 26 115/59 (77) 98   


 


9/17/20 09:00  48 26 131/56 (81) 99   


 


9/17/20 08:07  59      


 


9/17/20 08:00        85


 


9/17/20 08:00 99.2 66 21 124/81 (95) 97   


 


9/17/20 08:00      Mechanical Ventilator  





      Mechanical Ventilator  


 


9/17/20 07:45    124/81    


 


9/17/20 07:09  50 26     85


 


9/17/20 07:00  60 24 106/67 (80) 99   


 


9/17/20 06:00  67 21 129/69 (89) 99   


 


9/17/20 05:00  65 22 126/68 (87) 100   


 


9/17/20 04:00  67      


 


9/17/20 04:00        85


 


9/17/20 04:00      Mechanical Ventilator  





      Mechanical Ventilator  


 


9/17/20 04:00 98.4 67 26 111/77 (88) 100   


 


9/17/20 03:20  78 26     85


 


9/17/20 03:00  73 20 102/66 (78) 99   


 


9/17/20 02:00  66 24 121/66 (84) 100   


 


9/17/20 01:00  63 21 121/59 (79) 100   


 


9/17/20 00:00        85


 


9/17/20 00:00 98.4 60 22 116/55 (75) 100   


 


9/17/20 00:00      Mechanical Ventilator  





      Mechanical Ventilator  


 


9/16/20 23:58  59      


 


9/16/20 23:00  65 21 131/77 (95) 100   


 


9/16/20 22:50  48 26     85


 


9/16/20 22:00  61 22 107/75 (86) 100   


 


9/16/20 21:00  57 23 105/54 (71) 100   








General Appearance:  no apparent distress, on vent, patient on isolation, 

isolation precautions


Cardiovascular:  normal rate


Respiratory/Chest:  lungs clear


Abdomen:  normal bowel sounds, non tender, soft


Extremities:  moderate edema











Intake and Output  


 


 9/16/20 9/17/20





 19:00 07:00


 


Intake Total 1241.6667 ml 880 ml


 


Output Total 845 ml 1000 ml


 


Balance 396.6667 ml -120 ml


 


  


 


Free Water 60 ml 


 


IV Total 581.6667 ml 


 


Tube Feeding 600 ml 720 ml


 


Other  160 ml


 


Output Urine Total 825 ml 1000 ml


 


Stool Total 20 ml 0 ml


 


# Bowel Movements  1











Laboratory Tests








Test


 9/17/20


03:50 9/17/20


08:05


 


White Blood Count


 15.7 K/UL


(4.8-10.8)  H 





 


Red Blood Count


 2.45 M/UL


(4.20-5.40)  L 





 


Hemoglobin


 8.0 G/DL


(12.0-16.0)  L 





 


Hematocrit


 23.9 %


(37.0-47.0)  L 





 


Mean Corpuscular Volume 98 FL (80-99)   


 


Mean Corpuscular Hemoglobin


 32.8 PG


(27.0-31.0)  H 





 


Mean Corpuscular Hemoglobin


Concent 33.6 G/DL


(32.0-36.0) 





 


Red Cell Distribution Width


 14.7 %


(11.6-14.8) 





 


Platelet Count


 126 K/UL


(150-450)  L 





 


Mean Platelet Volume


 9.0 FL


(6.5-10.1) 





 


Neutrophils (%) (Auto)


 % (45.0-75.0)


 





 


Lymphocytes (%) (Auto)


 % (20.0-45.0)


 





 


Monocytes (%) (Auto)  % (1.0-10.0)   


 


Eosinophils (%) (Auto)  % (0.0-3.0)   


 


Basophils (%) (Auto)  % (0.0-2.0)   


 


Differential Total Cells


Counted 100  


 





 


Neutrophils % (Manual) 94 % (45-75)  H 


 


Lymphocytes % (Manual) 3 % (20-45)  L 


 


Monocytes % (Manual) 3 % (1-10)   


 


Eosinophils % (Manual) 0 % (0-3)   


 


Basophils % (Manual) 0 % (0-2)   


 


Band Neutrophils 0 % (0-8)   


 


Platelet Estimate Decreased  L 


 


Platelet Morphology Normal   


 


Hypochromasia 2+   


 


Anisocytosis 1+   


 


Sodium Level


 149 MMOL/L


(136-145)  H 





 


Potassium Level


 2.8 MMOL/L


(3.5-5.1)  L 





 


Chloride Level


 114 MMOL/L


()  H 





 


Carbon Dioxide Level


 26 MMOL/L


(21-32) 





 


Anion Gap


 10 mmol/L


(5-15) 





 


Blood Urea Nitrogen


 30 mg/dL


(7-18)  H 





 


Creatinine


 0.7 MG/DL


(0.55-1.30) 





 


Estimat Glomerular Filtration


Rate > 60 mL/min


(>60) 





 


Glucose Level


 252 MG/DL


()  H 





 


Calcium Level


 7.3 MG/DL


(8.5-10.1)  L 





 


Phosphorus Level


 2.2 MG/DL


(2.5-4.9)  L 





 


Magnesium Level


 2.0 MG/DL


(1.8-2.4) 





 


Total Bilirubin


 0.5 MG/DL


(0.2-1.0) 





 


Aspartate Amino Transf


(AST/SGOT) 20 U/L (15-37)


 





 


Alanine Aminotransferase


(ALT/SGPT) 59 U/L (12-78)


 





 


Alkaline Phosphatase


 56 U/L


() 





 


Total Protein


 4.9 G/DL


(6.4-8.2)  L 





 


Albumin


 1.3 G/DL


(3.4-5.0)  L 





 


Globulin 3.6 g/dL   


 


Albumin/Globulin Ratio


 0.4 (1.0-2.7)


L 





 


Arterial Blood pH


 


 7.558


(7.350-7.450)


 


Arterial Blood Partial


Pressure CO2 


 33.5 mmHg


(35.0-45.0)  L


 


Arterial Blood Partial


Pressure O2 


 87.5 mmHg


(75.0-100.0)


 


Arterial Blood HCO3


 


 29.2 mmol/L


(22.0-26.0)  H


 


Arterial Blood Oxygen


Saturation 


 96.2 %


()


 


Arterial Blood Base Excess  6.6 (-2-2)  H


 


Cole Test  Positive  

















Constantine Jorgensen MD          Sep 17, 2020 20:38

## 2020-09-17 NOTE — NUR
NURSE NOTES:

PATIENT AWOKE, TOLERATED G TUBE FEEDING, F/C INTACT AND PATENT, MARY COLOR URINE OUTED, 
WILL CONTINUE TO MONITOR.

## 2020-09-17 NOTE — PULMONOLGY CRITICAL CARE NOTE
Critical Care - Asmt/Plan


Problems:  


(1) Acute respiratory failure


(2) Bacteremia


(3) Pneumonia


(4) Sepsis


(5) HCAP (healthcare-associated pneumonia)


(6) Seizure disorder


(7) Down's syndrome


Respiratory:  adjust tidal volume, monitor respiratory rate, adjust FIO2, CXR


Cardiac:  continue to monitor HR/BP


Renal:  F/U I&O, keep IV fluid, check electrolytes


Infectious Disease:  check cultures, continue antibiotics


Gastrointestinal:  continue feedings/current rate


Endocrine:  monitor blood sugar


Hematologic:  monitor H/H


Neurologic:  PRN Ativan


Prophylaxis:  Protonix


Time Spent (Minutes):  40


Notes Reviewed:  internist, cardio, renal


Discussed with:  nurses, consultants, 





Critical Care - Objective





Last 24 Hour Vital Signs








  Date Time  Temp Pulse Resp B/P (MAP) Pulse Ox O2 Delivery O2 Flow Rate FiO2


 


9/17/20 10:38  55 26     85


 


9/17/20 10:00  48 26 115/59 (77) 98   


 


9/17/20 09:00  48 26 131/56 (81) 99   


 


9/17/20 08:07  59      


 


9/17/20 08:00        85


 


9/17/20 08:00 99.2 66 21 124/81 (95) 97   


 


9/17/20 08:00      Mechanical Ventilator  





      Mechanical Ventilator  


 


9/17/20 07:45    124/81    


 


9/17/20 07:09  50 26     85


 


9/17/20 07:00  60 24 106/67 (80) 99   


 


9/17/20 06:00  67 21 129/69 (89) 99   


 


9/17/20 05:00  65 22 126/68 (87) 100   


 


9/17/20 04:00  67      


 


9/17/20 04:00        85


 


9/17/20 04:00      Mechanical Ventilator  





      Mechanical Ventilator  


 


9/17/20 04:00 98.4 67 26 111/77 (88) 100   


 


9/17/20 03:20  78 26     85


 


9/17/20 03:00  73 20 102/66 (78) 99   


 


9/17/20 02:00  66 24 121/66 (84) 100   


 


9/17/20 01:00  63 21 121/59 (79) 100   


 


9/17/20 00:00        85


 


9/17/20 00:00 98.4 60 22 116/55 (75) 100   


 


9/17/20 00:00      Mechanical Ventilator  





      Mechanical Ventilator  


 


9/16/20 23:58  59      


 


9/16/20 23:00  65 21 131/77 (95) 100   


 


9/16/20 22:50  48 26     85


 


9/16/20 22:00  61 22 107/75 (86) 100   


 


9/16/20 21:00  57 23 105/54 (71) 100   


 


9/16/20 20:00        85


 


9/16/20 20:00      Mechanical Ventilator  





      Mechanical Ventilator  


 


9/16/20 20:00 98.4 58 19 97/54 (68) 100   


 


9/16/20 19:32  59      


 


9/16/20 19:00  69 22 105/57 (73) 94   


 


9/16/20 18:51  55 26     85


 


9/16/20 18:00  57 23 146/57 (86) 100   


 


9/16/20 17:00  46 26 123/70 (87) 98   


 


9/16/20 16:00        85


 


9/16/20 16:00      Mechanical Ventilator  





      Mechanical Ventilator  


 


9/16/20 16:00  50 26 116/56 (76) 98   


 


9/16/20 16:00  62      


 


9/16/20 15:05  58 26     85


 


9/16/20 15:00  56 26 117/75 (89) 99   


 


9/16/20 14:00  58 25 108/64 (79) 100   


 


9/16/20 13:00  50 24 117/54 (75) 98   


 


9/16/20 12:00        100


 


9/16/20 12:00      Mechanical Ventilator  





      Mechanical Ventilator  


 


9/16/20 12:00  44      


 


9/16/20 12:00 97.1 45 26 98/51 (67) 99   


 


9/16/20 11:00  46 26 127/54 (78) 99   


 


9/16/20 11:00  45 26     100








Status:  awake


HEENT:  atraumatic


Neck:  full ROM


Lungs:  rales, rhonchi


Heart:  HR/BP stable


Abdomen:  soft, active bowel sounds


Extremities:  no C/C/E


Micro:





Microbiology








 Date/Time


Source Procedure


Growth Status





 


 9/14/20 18:05


Stool Clostridium difficile Toxin Assay - Final Complete








Accucheck:  131





Critical Care - Subjective


ROS Limited/Unobtainable:  Yes


FI02:  85


Vent Support Breath Rate:  26


Vent Support Mode:  AC


Vent Tidal Volume:  500


Sputum Amount:  Moderate


PEEP:  10.0


PIP:  45


Tube Feeding Amount:  60


I&O:











Intake and Output  


 


 9/16/20 9/17/20





 19:00 07:00


 


Intake Total 1241.6667 ml 880 ml


 


Output Total 845 ml 1000 ml


 


Balance 396.6667 ml -120 ml


 


  


 


Free Water 60 ml 


 


IV Total 581.6667 ml 


 


Tube Feeding 600 ml 720 ml


 


Other  160 ml


 


Output Urine Total 825 ml 1000 ml


 


Stool Total 20 ml 0 ml


 


# Bowel Movements  1








CXR:


no change


ET-Tube:  7.5


ET Position:  22


Labs:





Laboratory Tests








Test


 9/17/20


03:50 9/17/20


08:05


 


White Blood Count


 15.7 K/UL


(4.8-10.8)  H 





 


Red Blood Count


 2.45 M/UL


(4.20-5.40)  L 





 


Hemoglobin


 8.0 G/DL


(12.0-16.0)  L 





 


Hematocrit


 23.9 %


(37.0-47.0)  L 





 


Mean Corpuscular Volume 98 FL (80-99)   


 


Mean Corpuscular Hemoglobin


 32.8 PG


(27.0-31.0)  H 





 


Mean Corpuscular Hemoglobin


Concent 33.6 G/DL


(32.0-36.0) 





 


Red Cell Distribution Width


 14.7 %


(11.6-14.8) 





 


Platelet Count


 126 K/UL


(150-450)  L 





 


Mean Platelet Volume


 9.0 FL


(6.5-10.1) 





 


Neutrophils (%) (Auto)


 % (45.0-75.0)


 





 


Lymphocytes (%) (Auto)


 % (20.0-45.0)


 





 


Monocytes (%) (Auto)  % (1.0-10.0)   


 


Eosinophils (%) (Auto)  % (0.0-3.0)   


 


Basophils (%) (Auto)  % (0.0-2.0)   


 


Neutrophils % (Manual) Pending   


 


Lymphocytes % (Manual) Pending   


 


Platelet Estimate Pending   


 


Platelet Morphology Pending   


 


Sodium Level


 149 MMOL/L


(136-145)  H 





 


Potassium Level


 2.8 MMOL/L


(3.5-5.1)  L 





 


Chloride Level


 114 MMOL/L


()  H 





 


Carbon Dioxide Level


 26 MMOL/L


(21-32) 





 


Anion Gap


 10 mmol/L


(5-15) 





 


Blood Urea Nitrogen


 30 mg/dL


(7-18)  H 





 


Creatinine


 0.7 MG/DL


(0.55-1.30) 





 


Estimat Glomerular Filtration


Rate > 60 mL/min


(>60) 





 


Glucose Level


 252 MG/DL


()  H 





 


Calcium Level


 7.3 MG/DL


(8.5-10.1)  L 





 


Phosphorus Level


 2.2 MG/DL


(2.5-4.9)  L 





 


Magnesium Level


 2.0 MG/DL


(1.8-2.4) 





 


Total Bilirubin


 0.5 MG/DL


(0.2-1.0) 





 


Aspartate Amino Transf


(AST/SGOT) 20 U/L (15-37)


 





 


Alanine Aminotransferase


(ALT/SGPT) 59 U/L (12-78)


 





 


Alkaline Phosphatase


 56 U/L


() 





 


Total Protein


 4.9 G/DL


(6.4-8.2)  L 





 


Albumin


 1.3 G/DL


(3.4-5.0)  L 





 


Globulin 3.6 g/dL   


 


Albumin/Globulin Ratio


 0.4 (1.0-2.7)


L 





 


Arterial Blood pH


 


 7.558


(7.350-7.450)


 


Arterial Blood Partial


Pressure CO2 


 33.5 mmHg


(35.0-45.0)  L


 


Arterial Blood Partial


Pressure O2 


 87.5 mmHg


(75.0-100.0)


 


Arterial Blood HCO3


 


 29.2 mmol/L


(22.0-26.0)  H


 


Arterial Blood Oxygen


Saturation 


 96.2 %


()


 


Arterial Blood Base Excess  6.6 (-2-2)  H


 


Cole Test  Positive  

















Chano Cherry MD              Sep 17, 2020 10:44

## 2020-09-17 NOTE — INTERNAL MED PROGRESS NOTE
Subjective


Date of Service:  Sep 17, 2020


Physician Name


Sanya Schultz


Attending Physician


Chano Cherry MD





Current Medications








 Medications


  (Trade)  Dose


 Ordered  Sig/Didi


 Route


 PRN Reason  Start Time


 Stop Time Status Last Admin


Dose Admin


 


 Acetaminophen


  (Tylenol)  650 mg  Q4H  PRN


 GT


 FEVER  8/26/20 11:45


 9/25/20 11:44  9/7/20 03:17





 


 Chlorhexidine


 Gluconate


  (Beatrice-Hex 2%)  1 applic  DAILY@2000


 TOPIC


   8/31/20 20:00


 11/29/20 19:59  9/16/20 19:31





 


 Clotrimazole


  (Lotrimin)  1 applic  Q12HR


 TOPIC


   8/30/20 13:00


 11/28/20 12:59  9/17/20 08:25





 


 Dextrose


  (Dextrose 50%)  25 ml  Q30M  PRN


 IV


 Hypoglycemia  8/26/20 11:30


 11/20/20 11:29   





 


 Dextrose


  (Dextrose 50%)  50 ml  Q30M  PRN


 IV


 Hypoglycemia  8/26/20 11:30


 11/20/20 11:29   





 


 Dopamine HCl/


 Dextrose  250 ml @ 0


 mls/hr  Q24H


 IV


   9/4/20 11:15


 12/3/20 11:14  9/6/20 19:47





 


 Hydrocortisone


  (Solu-CORTEF)  100 mg  EVERY 8  HOURS


 IV


   8/30/20 14:00


 11/28/20 13:59  9/17/20 13:27





 


 Levothyroxine


 Sodium


  (Synthroid)  75 mcg  DAILY


 GT


   8/27/20 09:00


 9/22/20 08:59  9/17/20 08:25





 


 Loperamide HCl


  (Imodium)  2 mg  Q6H  PRN


 NG


 Diarrhea  9/16/20 10:30


 10/16/20 10:29   





 


 Midodrine


  (Pro-Amatine)  10 mg  Q8HR


 GT


   8/28/20 14:00


 11/26/20 13:59  9/17/20 13:27





 


 Norepinephrine


 Bitartrate 16 mg/


 Dextrose  500 ml @ 0


 mls/hr  Q24H


 IV


   8/31/20 07:45


 9/29/20 12:59  9/4/20 08:43





 


 Ondansetron HCl


  (Zofran)  4 mg  Q6H  PRN


 IVP


 Nausea & Vomiting  8/26/20 12:00


 9/21/20 11:59   





 


 Pantoprazole


  (Protonix)  40 mg  DAILY


 IV


   8/27/20 09:00


 9/22/20 08:59  9/17/20 08:25





 


 Phenylephrine HCl


 50 mg/Dextrose  250 ml @ 0


 mls/hr  Q24H  PRN


 IV


 For hypotension  8/29/20 17:45


 9/28/20 17:44  8/31/20 01:49





 


 Polyethylene


 Glycol


  (Miralax)  17 gm  DAILYPRN  PRN


 GT


 Constipation  8/26/20 12:00


 9/21/20 11:59   





 


 Valproic Acid


  (Depakene)  500 mg  EVERY 8  HOURS


 GT


   8/26/20 14:00


 9/21/20 21:59  9/17/20 13:27











Allergies:  


Coded Allergies:  


     No Known Allergies (Unverified , 1/8/19)


Subjective


58 YO F with Down's syndrome admitted with hypoxia.  Now sepsis and pneumonia.  

Cover for Int Phi-DR Hung.  ICU.  Intubated and sedated





Objective





Last Vital Signs








  Date Time  Temp Pulse Resp B/P (MAP) Pulse Ox O2 Delivery O2 Flow Rate FiO2


 


9/17/20 19:15  67 26     85


 


9/17/20 19:00    122/84 (97) 100   


 


9/17/20 16:00 99.0       


 


9/17/20 16:00      Mechanical Ventilator  





      Mechanical Ventilator  











Laboratory Tests








Test


 9/17/20


03:50 9/17/20


08:05


 


White Blood Count


 15.7 K/UL


(4.8-10.8)  H 





 


Red Blood Count


 2.45 M/UL


(4.20-5.40)  L 





 


Hemoglobin


 8.0 G/DL


(12.0-16.0)  L 





 


Hematocrit


 23.9 %


(37.0-47.0)  L 





 


Mean Corpuscular Volume 98 FL (80-99)   


 


Mean Corpuscular Hemoglobin


 32.8 PG


(27.0-31.0)  H 





 


Mean Corpuscular Hemoglobin


Concent 33.6 G/DL


(32.0-36.0) 





 


Red Cell Distribution Width


 14.7 %


(11.6-14.8) 





 


Platelet Count


 126 K/UL


(150-450)  L 





 


Mean Platelet Volume


 9.0 FL


(6.5-10.1) 





 


Neutrophils (%) (Auto)


 % (45.0-75.0)


 





 


Lymphocytes (%) (Auto)


 % (20.0-45.0)


 





 


Monocytes (%) (Auto)  % (1.0-10.0)   


 


Eosinophils (%) (Auto)  % (0.0-3.0)   


 


Basophils (%) (Auto)  % (0.0-2.0)   


 


Differential Total Cells


Counted 100  


 





 


Neutrophils % (Manual) 94 % (45-75)  H 


 


Lymphocytes % (Manual) 3 % (20-45)  L 


 


Monocytes % (Manual) 3 % (1-10)   


 


Eosinophils % (Manual) 0 % (0-3)   


 


Basophils % (Manual) 0 % (0-2)   


 


Band Neutrophils 0 % (0-8)   


 


Platelet Estimate Decreased  L 


 


Platelet Morphology Normal   


 


Hypochromasia 2+   


 


Anisocytosis 1+   


 


Sodium Level


 149 MMOL/L


(136-145)  H 





 


Potassium Level


 2.8 MMOL/L


(3.5-5.1)  L 





 


Chloride Level


 114 MMOL/L


()  H 





 


Carbon Dioxide Level


 26 MMOL/L


(21-32) 





 


Anion Gap


 10 mmol/L


(5-15) 





 


Blood Urea Nitrogen


 30 mg/dL


(7-18)  H 





 


Creatinine


 0.7 MG/DL


(0.55-1.30) 





 


Estimat Glomerular Filtration


Rate > 60 mL/min


(>60) 





 


Glucose Level


 252 MG/DL


()  H 





 


Calcium Level


 7.3 MG/DL


(8.5-10.1)  L 





 


Phosphorus Level


 2.2 MG/DL


(2.5-4.9)  L 





 


Magnesium Level


 2.0 MG/DL


(1.8-2.4) 





 


Total Bilirubin


 0.5 MG/DL


(0.2-1.0) 





 


Aspartate Amino Transf


(AST/SGOT) 20 U/L (15-37)


 





 


Alanine Aminotransferase


(ALT/SGPT) 59 U/L (12-78)


 





 


Alkaline Phosphatase


 56 U/L


() 





 


Total Protein


 4.9 G/DL


(6.4-8.2)  L 





 


Albumin


 1.3 G/DL


(3.4-5.0)  L 





 


Globulin 3.6 g/dL   


 


Albumin/Globulin Ratio


 0.4 (1.0-2.7)


L 





 


Arterial Blood pH


 


 7.558


(7.350-7.450)


 


Arterial Blood Partial


Pressure CO2 


 33.5 mmHg


(35.0-45.0)  L


 


Arterial Blood Partial


Pressure O2 


 87.5 mmHg


(75.0-100.0)


 


Arterial Blood HCO3


 


 29.2 mmol/L


(22.0-26.0)  H


 


Arterial Blood Oxygen


Saturation 


 96.2 %


()


 


Arterial Blood Base Excess  6.6 (-2-2)  H


 


Cole Test  Positive  

















Intake and Output  


 


 9/16/20 9/17/20





 19:00 07:00


 


Intake Total 1241.6667 ml 880 ml


 


Output Total 845 ml 1000 ml


 


Balance 396.6667 ml -120 ml


 


  


 


Free Water 60 ml 


 


IV Total 581.6667 ml 


 


Tube Feeding 600 ml 720 ml


 


Other  160 ml


 


Output Urine Total 825 ml 1000 ml


 


Stool Total 20 ml 0 ml


 


# Bowel Movements  1








Objective


General Appearance:  WD/WN, no apparent distress, alert


EENT:  PERRL/EOMI, normal ENT inspection


Neck:  non-tender, normal alignment, supple, normal inspection


Cardiovascular:  normal peripheral pulses, normal rate, regular rhythm, no 

gallop/murmur, no JVD


Respiratory/Chest:  Mech vent; decreased breath sounds, crackles/rales, rhonchi 

- bilaterally, expiratory wheezing


Abdomen:  normal bowel sounds, non tender, soft, no organomegaly, no mass


Extremities:  normal range of motion


Neurologic:  CNs II-XII grossly normal


Skin:  normal pigmentation, warm/dry





Assessment/Plan


Problem List:  


(1) HCAP (healthcare-associated pneumonia)


Assessment & Plan:  Strep Group G.  Continue amikacin per ID=Dr Gomes.  

Pulmonary/Critical care=DR Cherry.  COVID NEG





(2) Sepsis


Assessment & Plan:  Staph haemolyticus.  Continue amikacin per ID=Dr Gomes





(3) Down's syndrome


(4) Dysphagia


Assessment & Plan:  S/P PEG





(5) Seizure disorder


Assessment & Plan:  Continue keppra and depakote





(6) Hypothyroidism


Assessment & Plan:  Continue synthroid





(7) Acute respiratory failure


Assessment & Plan:  Pulmonary = Dr Cherry; [facility] vent














Sanya Schultz MD               Sep 17, 2020 20:14

## 2020-09-18 VITALS — DIASTOLIC BLOOD PRESSURE: 57 MMHG | SYSTOLIC BLOOD PRESSURE: 124 MMHG

## 2020-09-18 VITALS — SYSTOLIC BLOOD PRESSURE: 130 MMHG | DIASTOLIC BLOOD PRESSURE: 60 MMHG

## 2020-09-18 VITALS — DIASTOLIC BLOOD PRESSURE: 63 MMHG | SYSTOLIC BLOOD PRESSURE: 129 MMHG

## 2020-09-18 VITALS — DIASTOLIC BLOOD PRESSURE: 55 MMHG | SYSTOLIC BLOOD PRESSURE: 139 MMHG

## 2020-09-18 VITALS — DIASTOLIC BLOOD PRESSURE: 85 MMHG | SYSTOLIC BLOOD PRESSURE: 131 MMHG

## 2020-09-18 VITALS — SYSTOLIC BLOOD PRESSURE: 139 MMHG | DIASTOLIC BLOOD PRESSURE: 69 MMHG

## 2020-09-18 VITALS — SYSTOLIC BLOOD PRESSURE: 129 MMHG | DIASTOLIC BLOOD PRESSURE: 61 MMHG

## 2020-09-18 VITALS — DIASTOLIC BLOOD PRESSURE: 69 MMHG | SYSTOLIC BLOOD PRESSURE: 133 MMHG

## 2020-09-18 VITALS — SYSTOLIC BLOOD PRESSURE: 119 MMHG | DIASTOLIC BLOOD PRESSURE: 68 MMHG

## 2020-09-18 VITALS — DIASTOLIC BLOOD PRESSURE: 58 MMHG | SYSTOLIC BLOOD PRESSURE: 119 MMHG

## 2020-09-18 VITALS — DIASTOLIC BLOOD PRESSURE: 97 MMHG | SYSTOLIC BLOOD PRESSURE: 130 MMHG

## 2020-09-18 VITALS — SYSTOLIC BLOOD PRESSURE: 128 MMHG | DIASTOLIC BLOOD PRESSURE: 66 MMHG

## 2020-09-18 VITALS — DIASTOLIC BLOOD PRESSURE: 88 MMHG | SYSTOLIC BLOOD PRESSURE: 109 MMHG

## 2020-09-18 VITALS — DIASTOLIC BLOOD PRESSURE: 63 MMHG | SYSTOLIC BLOOD PRESSURE: 153 MMHG

## 2020-09-18 VITALS — DIASTOLIC BLOOD PRESSURE: 86 MMHG | SYSTOLIC BLOOD PRESSURE: 132 MMHG

## 2020-09-18 VITALS — SYSTOLIC BLOOD PRESSURE: 133 MMHG | DIASTOLIC BLOOD PRESSURE: 55 MMHG

## 2020-09-18 VITALS — DIASTOLIC BLOOD PRESSURE: 74 MMHG | SYSTOLIC BLOOD PRESSURE: 144 MMHG

## 2020-09-18 VITALS — DIASTOLIC BLOOD PRESSURE: 72 MMHG | SYSTOLIC BLOOD PRESSURE: 130 MMHG

## 2020-09-18 VITALS — SYSTOLIC BLOOD PRESSURE: 133 MMHG | DIASTOLIC BLOOD PRESSURE: 72 MMHG

## 2020-09-18 VITALS — DIASTOLIC BLOOD PRESSURE: 92 MMHG | SYSTOLIC BLOOD PRESSURE: 125 MMHG

## 2020-09-18 VITALS — DIASTOLIC BLOOD PRESSURE: 64 MMHG | SYSTOLIC BLOOD PRESSURE: 143 MMHG

## 2020-09-18 VITALS — DIASTOLIC BLOOD PRESSURE: 51 MMHG | SYSTOLIC BLOOD PRESSURE: 113 MMHG

## 2020-09-18 VITALS — SYSTOLIC BLOOD PRESSURE: 126 MMHG | DIASTOLIC BLOOD PRESSURE: 54 MMHG

## 2020-09-18 VITALS — SYSTOLIC BLOOD PRESSURE: 115 MMHG | DIASTOLIC BLOOD PRESSURE: 49 MMHG

## 2020-09-18 LAB
% IRON SATURATION: 22 % (ref 15–50)
ADD MANUAL DIFF: YES
ALBUMIN SERPL-MCNC: 1.3 G/DL (ref 3.4–5)
ALBUMIN/GLOB SERPL: 0.4 {RATIO} (ref 1–2.7)
ALP SERPL-CCNC: 53 U/L (ref 46–116)
ALT SERPL-CCNC: 41 U/L (ref 12–78)
ANION GAP SERPL CALC-SCNC: 10 MMOL/L (ref 5–15)
AST SERPL-CCNC: 16 U/L (ref 15–37)
BILIRUB SERPL-MCNC: 0.5 MG/DL (ref 0.2–1)
BUN SERPL-MCNC: 26 MG/DL (ref 7–18)
CALCIUM SERPL-MCNC: 7.2 MG/DL (ref 8.5–10.1)
CHLORIDE SERPL-SCNC: 114 MMOL/L (ref 98–107)
CO2 SERPL-SCNC: 26 MMOL/L (ref 21–32)
CREAT SERPL-MCNC: 0.7 MG/DL (ref 0.55–1.3)
ERYTHROCYTE [DISTWIDTH] IN BLOOD BY AUTOMATED COUNT: 15.3 % (ref 11.6–14.8)
FERRITIN SERPL-MCNC: 369 NG/ML (ref 8–388)
GLOBULIN SER-MCNC: 3.4 G/DL
HCT VFR BLD CALC: 22.4 % (ref 37–47)
HGB BLD-MCNC: 7.5 G/DL (ref 12–16)
IRON SERPL-MCNC: 28 UG/DL (ref 50–175)
MCV RBC AUTO: 97 FL (ref 80–99)
PHOSPHATE SERPL-MCNC: 2.4 MG/DL (ref 2.5–4.9)
PLATELET # BLD: 116 K/UL (ref 150–450)
POTASSIUM SERPL-SCNC: 3.2 MMOL/L (ref 3.5–5.1)
RBC # BLD AUTO: 2.31 M/UL (ref 4.2–5.4)
SODIUM SERPL-SCNC: 150 MMOL/L (ref 136–145)
TIBC SERPL-MCNC: 126 UG/DL (ref 250–450)
UNSATURATED IRON BINDING: 98 UG/DL (ref 112–346)
WBC # BLD AUTO: 16.2 K/UL (ref 4.8–10.8)

## 2020-09-18 RX ADMIN — CHLORHEXIDINE GLUCONATE SCH APPLIC: 213 SOLUTION TOPICAL at 19:51

## 2020-09-18 RX ADMIN — INSULIN ASPART SCH UNITS: 100 INJECTION, SOLUTION INTRAVENOUS; SUBCUTANEOUS at 23:43

## 2020-09-18 RX ADMIN — VALPROIC ACID SCH MG: 250 SOLUTION ORAL at 14:05

## 2020-09-18 RX ADMIN — INSULIN DETEMIR SCH UNITS: 100 INJECTION, SOLUTION SUBCUTANEOUS at 21:02

## 2020-09-18 RX ADMIN — VALPROIC ACID SCH MG: 250 SOLUTION ORAL at 21:30

## 2020-09-18 RX ADMIN — MIDODRINE HYDROCHLORIDE SCH MG: 10 TABLET ORAL at 05:44

## 2020-09-18 RX ADMIN — MIDODRINE HYDROCHLORIDE SCH MG: 10 TABLET ORAL at 14:05

## 2020-09-18 RX ADMIN — DOPAMINE HYDROCHLORIDE IN DEXTROSE SCH MLS/HR: 1.6 INJECTION, SOLUTION INTRAVENOUS at 10:38

## 2020-09-18 RX ADMIN — Medication SCH MLS/HR: at 14:05

## 2020-09-18 RX ADMIN — HYDROCORTISONE SODIUM SUCCINATE SCH MG: 100 INJECTION, POWDER, FOR SOLUTION INTRAMUSCULAR; INTRAVENOUS at 14:05

## 2020-09-18 RX ADMIN — Medication SCH MLS/HR: at 09:31

## 2020-09-18 RX ADMIN — INSULIN ASPART SCH UNITS: 100 INJECTION, SOLUTION INTRAVENOUS; SUBCUTANEOUS at 17:23

## 2020-09-18 RX ADMIN — ACETAMINOPHEN PRN MG: 160 SOLUTION ORAL at 12:48

## 2020-09-18 RX ADMIN — INSULIN ASPART SCH UNITS: 100 INJECTION, SOLUTION INTRAVENOUS; SUBCUTANEOUS at 12:32

## 2020-09-18 RX ADMIN — SODIUM CHLORIDE SCH MLS/HR: 900 INJECTION, SOLUTION INTRAVENOUS at 07:41

## 2020-09-18 RX ADMIN — INSULIN DETEMIR SCH UNITS: 100 INJECTION, SOLUTION SUBCUTANEOUS at 10:36

## 2020-09-18 RX ADMIN — VALPROIC ACID SCH MG: 250 SOLUTION ORAL at 05:44

## 2020-09-18 RX ADMIN — HYDROCORTISONE SODIUM SUCCINATE SCH MG: 100 INJECTION, POWDER, FOR SOLUTION INTRAMUSCULAR; INTRAVENOUS at 21:30

## 2020-09-18 RX ADMIN — PANTOPRAZOLE SODIUM SCH MG: 40 INJECTION, POWDER, FOR SOLUTION INTRAVENOUS at 08:07

## 2020-09-18 RX ADMIN — MIDODRINE HYDROCHLORIDE SCH MG: 10 TABLET ORAL at 21:30

## 2020-09-18 RX ADMIN — HYDROCORTISONE SODIUM SUCCINATE SCH MG: 100 INJECTION, POWDER, FOR SOLUTION INTRAMUSCULAR; INTRAVENOUS at 05:44

## 2020-09-18 NOTE — GENERAL PROGRESS NOTE
Subjective


ROS Limited/Unobtainable:  No


Allergies:  


Coded Allergies:  


     No Known Allergies (Unverified , 1/8/19)





Objective





Last 24 Hour Vital Signs








  Date Time  Temp Pulse Resp B/P (MAP) Pulse Ox O2 Delivery O2 Flow Rate FiO2


 


9/18/20 11:08  78 26     85


 


9/18/20 11:00  78 21 125/92 (103) 100   


 


9/18/20 10:00  72 24 132/86 (101) 100   


 


9/18/20 09:00  60 26 131/85 (100) 99   


 


9/18/20 08:00      Mechanical Ventilator  





      Mechanical Ventilator  


 


9/18/20 08:00  58      


 


9/18/20 08:00        85


 


9/18/20 08:00 99.3 70 22 139/69 (92) 99   


 


9/18/20 07:20  78 26     85


 


9/18/20 07:00  60 26 144/74 (97) 100   


 


9/18/20 06:00  61 26 133/69 (90) 98   


 


9/18/20 05:00  58 26 143/64 (90) 98   


 


9/18/20 04:00      Mechanical Ventilator  





      Mechanical Ventilator  


 


9/18/20 04:00 99.0 84 21 119/68 (85) 93   


 


9/18/20 04:00        85


 


9/18/20 04:00  70      


 


9/18/20 03:10  81 26     85


 


9/18/20 03:00  59 26 130/72 (91) 97   


 


9/18/20 02:00  62 25 133/72 (92) 93   


 


9/18/20 01:00  73 22 128/66 (86) 96   


 


9/18/20 00:00      Mechanical Ventilator  





      Mechanical Ventilator  


 


9/18/20 00:00  75      


 


9/18/20 00:00 99.2 68 23 130/97 (108) 86   


 


9/18/20 00:00        85


 


9/17/20 23:10  73 26     85


 


9/17/20 23:00  58 26 125/71 (89) 97   


 


9/17/20 22:00  77 23 123/51 (75) 99   


 


9/17/20 21:00  73 19 108/50 (69) 94   


 


9/17/20 20:00 99.0 68 21 99/51 (67) 98   


 


9/17/20 20:00        85


 


9/17/20 20:00      Mechanical Ventilator  





      Mechanical Ventilator  


 


9/17/20 19:15  67 26     85


 


9/17/20 19:00  65 20 122/84 (97) 100   


 


9/17/20 18:00  66 18 119/69 (86) 100   


 


9/17/20 17:00  58 26 116/54 (74) 97   


 


9/17/20 16:00  74 34 131/74 (93) 96   


 


9/17/20 16:00        85


 


9/17/20 16:00 99.0 74 34 131/74 (93) 96   


 


9/17/20 16:00      Mechanical Ventilator  





      Mechanical Ventilator  


 


9/17/20 16:00  74      


 


9/17/20 15:30  74 26     85


 


9/17/20 15:00  62 21 130/62 (84) 99   


 


9/17/20 14:00  61 25 116/70 (85) 99   


 


9/17/20 14:00  61 21 116/70 (85) 98   


 


9/17/20 13:00  50 26 129/58 (81) 98   

















Intake and Output  


 


 9/17/20 9/18/20





 19:00 07:00


 


Intake Total 1680.000 ml 810 ml


 


Output Total 1035 ml 1080 ml


 


Balance 645.000 ml -270 ml


 


  


 


Free Water  90 ml


 


IV Total 960.000 ml 


 


Tube Feeding 720 ml 720 ml


 


Output Urine Total 935 ml 800 ml


 


Stool Total 100 ml 280 ml








Laboratory Tests


9/18/20 04:10: 


White Blood Count 16.2H, Red Blood Count 2.31L, Hemoglobin 7.5L, Hematocrit 22.4

L, Mean Corpuscular Volume 97, Mean Corpuscular Hemoglobin 32.5H, Mean 

Corpuscular Hemoglobin Concent 33.5, Red Cell Distribution Width 15.3H, Platelet

Count 116L, Mean Platelet Volume 9.6, Neutrophils (%) (Auto) , Lymphocytes (%) 

(Auto) , Monocytes (%) (Auto) , Eosinophils (%) (Auto) , Basophils (%) (Auto) , 

Differential Total Cells Counted 100, Neutrophils % (Manual) 94H, Lymphocytes % 

(Manual) 4L, Monocytes % (Manual) 2, Eosinophils % (Manual) 0, Basophils % 

(Manual) 0, Band Neutrophils 0, Platelet Estimate DecreasedL, Platelet 

Morphology Normal, Hypochromasia 1+, Anisocytosis 1+, Erythrocyte Sedimentation 

Rate 50H, Sodium Level 150H, Potassium Level 3.2L, Chloride Level 114H, Carbon 

Dioxide Level 26, Anion Gap 10, Blood Urea Nitrogen 26H, Creatinine 0.7, Estimat

Glomerular Filtration Rate > 60, Glucose Level 283H, Calcium Level 7.2L, 

Phosphorus Level 2.4L, Magnesium Level 1.9, Iron Level 28L, Total Iron Binding 

Capacity 126L, Percent Iron Saturation 22, Unsaturated Iron Binding 98L, 

Ferritin 369, Total Bilirubin 0.5, Aspartate Amino Transf (AST/SGOT) 16, Alanine

Aminotransferase (ALT/SGPT) 41, Alkaline Phosphatase 53, C-Reactive Protein, 

Quantitative 1.9H, Pro-B-Type Natriuretic Peptide 6967H, Total Protein 4.7L, 

Albumin 1.3L, Globulin 3.4, Albumin/Globulin Ratio 0.4L


Height (Feet):  5


Height (Inches):  3.00


Weight (Pounds):  172


General Appearance:  no apparent distress


EENT:  normal ENT inspection


Neck:  supple


Cardiovascular:  normal rate


Respiratory/Chest:  decreased breath sounds


Abdomen:  hypoactive bowel sounds


Extremities:  non-tender, swelling





Assessment/Plan


Assessment/Plan:


1. History of Down syndrome.


2. Dysphagia with G-tube.


3. Seizure disorder.


4. Hypothyroidism.


5. DAVID.


6. Pneumonia.


7. Sepsis.








fu H&H


ppi


GTF


hold GI procedures for now


check C.diff


stool ob + 1/2











Ted Nagy MD             Sep 18, 2020 12:46

## 2020-09-18 NOTE — NUR
NURSE NOTES: Report received from LAYTON Faulkner. Pt lying comfortably in semi-fowlers with no 
signs of acute distress noted. A+Ox1, with eyes closed, opens eyes to voice and tracks with 
eyes. Pt nonverbal and does not follow commands. Pt SR on the monitor @ 65. Respirations 
even and unlabored on mech vent with ETT 7.5, 22 cm @ the lip. Rhonchi heard bilaterally on 
auscultation. Vent settings AC 26 , peep of 10, 85% fio2. G-tube noted running feeding 
@ prescribed rate. Left upper arm PICC running TKO.  Patient has sacral and perianal redness 
with labia swelling, clean and dry. All other vitals stable as documented. Bed at lowest 
position, brakes engaged, siderails x3, bed alarm on, and call light within reach. Pt at 
stable condition at this time, will continue to monitor.

## 2020-09-18 NOTE — NUR
NURSE HAND-OFF REPORT: 



Latest Vital Signs: Temperature 99.0 , Pulse 61 , B/P 133 /69 , Respiratory Rate 26 , O2 SAT 
98 , Mechanical Ventilator, O2 Flow Rate 15.0 .  

Vital Sign Comment: 



EKG Rhythm: Sinus Rhythm

Rhythm change?: N 

MD Notified?: -

MD Response: 



Latest Momin Fall Score: 70  

Fall Risk: High Risk 

Safety Measures: Call light Within Reach, Bed Alarm Zone 1, Side Rails Side Rails x3, Bed 
position Low and Locked.

Fall Precautions: 

Yellow Socks

Door Sign

Patient Fall Education



Report given to Annie TRAYLOR.

## 2020-09-18 NOTE — NUR
CASE MANAGEMENT: REVIEW 



9/18/2020



SI:SEPSIS. PNA. 

HX: DOWN SYNDROME 

100.3   64   26   115/49   98% ON MECH VENT ET FIO2 85

WBC 16.3   H/H 7.5/22.4    NA+150   K+3.2   CL-114  BUN 26

    CA+7.2  PHOS 2.4   BNP 6967   ALB 1.3 

  



IS:IV KPHOS/NS BOLUS X2

PRO-AMATINE GT TID

DEPAKENE GT TID

LEVEMIR SQ BID

IV PROTONIX QD

LOTRIMIN TP BID

CHEST X-RAY- Extensive interstitial and bilateral alveolar opacities, with interval 
worsening of aeration of the left mid and upper lung compared to the prior exam.Endotracheal 
tube and left arm PICC line remain in place.





***ICU*** 



PLAN OF CARE: 

PLACE RECTAL TUBE 

ADJUST FiO2 

CONT REPARATORY CARE ~WEAN 

CONSERVATOR TO TRACH PLACEMENT

## 2020-09-18 NOTE — INFECTIOUS DISEASES PROG NOTE
Assessment/Plan








ASSESSMENT:


sp code blue 8/26





Septic Shock; SP


Fever, recurrent- SP


Leukocytosis; persistent/fluctuating


   -9/9 u/a no pyuria


   -9/8 Bcx NTD (Picc line)


   -9/6 Bcx NTD


            ucx Neg


             sp cx C. parapsilopsis


  -8/26 u/a no pyuria





Pneumonia.- COVID 19 neg x3


   Acute hypoxic resp failure on VM> NRB 15l 100%; hypoxic on ABG> now VDRF 

8/26- Fio2 80% >100% 8/28> 60% 9/1 >80% 9/2   >95% 9/10 >90% 9/14 >60% 9/15


      -9/14 CXR: Improved aeration of both lungs.


       -9/5 CXR:Small bilateral pleural effusions with minor edema.  Stable 

edema with  mild worsening in the degree of pleural effusion on the right.


         -9/1 sp cx Neg


         8/31 CXR: Extensive bilateral interstitial and airspace disease appears

similar to the prior exam. Moderate to large bilateral pleural effusions appear 

unchanged.


   8/28 CXR: Increasing left upper lobe dense consolidation and likely 

increasing bilateral pleural fluid. Persistent diffuse dense consolidation 

elsewhere


            -8/26 sp cx normal resp lilliam


             -8/25 CXR: Increased atelectasis of the right lung, since prior 

exam of 3 days earlier. New or increased right pleural effusion. Increased left 

basilar consolidation and/or pleural fluid 


          -COVID Rapid PCR neg 8/23, 8/23, 8/26


          -8/22 spc x Group G strep


          -8/22 CXR:   Reduced lung volumes.  Patchy bilateral predominantly 

interstitial  pulmonary opacities.  Could be from edema and/or pneumonia.  There

is a broader differential.


 


        -legionella ag urine, blasto ab, Histo ab, HIV ab screen, JOY, ANCA neg





Persistent, high grade bacteremia-


     -8/22 Bcx 4/4 sets S. haemolyticus; 8/23 Bcx 3/4 S/ epi; 8/27 Bcx 1.4 S. 

warnerri; 8/29 Bcx Neg


     -2d echo: no vegetaions seen





          ua/ wbc 10-15, nit neg, leuk +1; ucx Neg





DAVID; 


 -supratherapeutic vanco levels





-Seizure disorder.


- Hypothyroidism.


- Down syndrome.





History of PEG tube placement.


[facility] resident





PLAN:





Continue to monitor off abx


          9/14 SP Meropenem #18, IV AMikacin #7


          9/4/20 SP Daptomycin #11


          9/2 SP MIcafungin #7, Linezolid #5


   8/28 SP Azithromycin #7/7


   8/26 SP Ceftriaxone #2


   8/25 SP IV Vancomycin #4, Zosyn #4


   8/22 SP Cefepime x1, Flagyl x1





- Monitor CBC, BMP.


.f/u  Repeat cx


- COVID neg x3


- Monitor chest x-ray.


- Monitor the patient's clinical course and labs.  Based on those, we will do 

further recommendation.


-f/u Bcx from picc line, cdiff if diarrhea


-f/u Fungitell, asp ag, flow cytometry


-Once stable, CT chest/abd/pw/ given persistent leukocytosis


-poor prognosis





Thank you, Dr. Cherry, for allowing me to participate in the care of


this patient.  I will follow the patient with you at this


hospitalization.








Discussed with RN





Subjective


Allergies:  


Coded Allergies:  


     No Known Allergies (Unverified , 1/8/19)


afebrile


wbc bt 15-16


Fio2 85%


off abx





Objective





Last 24 Hour Vital Signs








  Date Time  Temp Pulse Resp B/P (MAP) Pulse Ox O2 Delivery O2 Flow Rate FiO2


 


9/18/20 11:08  78 26     85


 


9/18/20 11:00  78 21 125/92 (103) 100   


 


9/18/20 10:00  72 24 132/86 (101) 100   


 


9/18/20 09:00  60 26 131/85 (100) 99   


 


9/18/20 08:00      Mechanical Ventilator  





      Mechanical Ventilator  


 


9/18/20 08:00  58      


 


9/18/20 08:00        85


 


9/18/20 08:00 99.3 70 22 139/69 (92) 99   


 


9/18/20 07:20  78 26     85


 


9/18/20 07:00  60 26 144/74 (97) 100   


 


9/18/20 06:00  61 26 133/69 (90) 98   


 


9/18/20 05:00  58 26 143/64 (90) 98   


 


9/18/20 04:00      Mechanical Ventilator  





      Mechanical Ventilator  


 


9/18/20 04:00 99.0 84 21 119/68 (85) 93   


 


9/18/20 04:00        85


 


9/18/20 04:00  70      


 


9/18/20 03:10  81 26     85


 


9/18/20 03:00  59 26 130/72 (91) 97   


 


9/18/20 02:00  62 25 133/72 (92) 93   


 


9/18/20 01:00  73 22 128/66 (86) 96   


 


9/18/20 00:00      Mechanical Ventilator  





      Mechanical Ventilator  


 


9/18/20 00:00  75      


 


9/18/20 00:00 99.2 68 23 130/97 (108) 86   


 


9/18/20 00:00        85


 


9/17/20 23:10  73 26     85


 


9/17/20 23:00  58 26 125/71 (89) 97   


 


9/17/20 22:00  77 23 123/51 (75) 99   


 


9/17/20 21:00  73 19 108/50 (69) 94   


 


9/17/20 20:00 99.0 68 21 99/51 (67) 98   


 


9/17/20 20:00        85


 


9/17/20 20:00      Mechanical Ventilator  





      Mechanical Ventilator  


 


9/17/20 19:15  67 26     85


 


9/17/20 19:00  65 20 122/84 (97) 100   


 


9/17/20 18:00  66 18 119/69 (86) 100   


 


9/17/20 17:00  58 26 116/54 (74) 97   


 


9/17/20 16:00  74 34 131/74 (93) 96   


 


9/17/20 16:00        85


 


9/17/20 16:00 99.0 74 34 131/74 (93) 96   


 


9/17/20 16:00      Mechanical Ventilator  





      Mechanical Ventilator  


 


9/17/20 16:00  74      


 


9/17/20 15:30  74 26     85


 


9/17/20 15:00  62 21 130/62 (84) 99   


 


9/17/20 14:00  61 25 116/70 (85) 99   


 


9/17/20 14:00  61 21 116/70 (85) 98   


 


9/17/20 13:00  50 26 129/58 (81) 98   








Height (Feet):  5


Height (Inches):  3.00


Weight (Pounds):  172


HEENT:  No pale conjunctivae.  No icterus. ETT in place


NECK:  No lymphadenopathy.


CHEST:  Coarse breathing sounds.


HEART:  S1 and S2.


ABDOMEN:  Soft.  PEG tube in place.


EXTREMITIES:  No cyanosis at this time .


SKIN: no rash





Laboratory Tests








Test


 9/18/20


04:10


 


White Blood Count


 16.2 K/UL


(4.8-10.8)  H


 


Red Blood Count


 2.31 M/UL


(4.20-5.40)  L


 


Hemoglobin


 7.5 G/DL


(12.0-16.0)  L


 


Hematocrit


 22.4 %


(37.0-47.0)  L


 


Mean Corpuscular Volume 97 FL (80-99)  


 


Mean Corpuscular Hemoglobin


 32.5 PG


(27.0-31.0)  H


 


Mean Corpuscular Hemoglobin


Concent 33.5 G/DL


(32.0-36.0)


 


Red Cell Distribution Width


 15.3 %


(11.6-14.8)  H


 


Platelet Count


 116 K/UL


(150-450)  L


 


Mean Platelet Volume


 9.6 FL


(6.5-10.1)


 


Neutrophils (%) (Auto)


 % (45.0-75.0)





 


Lymphocytes (%) (Auto)


 % (20.0-45.0)





 


Monocytes (%) (Auto)  % (1.0-10.0)  


 


Eosinophils (%) (Auto)  % (0.0-3.0)  


 


Basophils (%) (Auto)  % (0.0-2.0)  


 


Differential Total Cells


Counted 100  





 


Neutrophils % (Manual) 94 % (45-75)  H


 


Lymphocytes % (Manual) 4 % (20-45)  L


 


Monocytes % (Manual) 2 % (1-10)  


 


Eosinophils % (Manual) 0 % (0-3)  


 


Basophils % (Manual) 0 % (0-2)  


 


Band Neutrophils 0 % (0-8)  


 


Platelet Estimate Decreased  L


 


Platelet Morphology Normal  


 


Hypochromasia 1+  


 


Anisocytosis 1+  


 


Erythrocyte Sedimentation Rate


 50 MM/HR


(0-30)  H


 


Sodium Level


 150 MMOL/L


(136-145)  H


 


Potassium Level


 3.2 MMOL/L


(3.5-5.1)  L


 


Chloride Level


 114 MMOL/L


()  H


 


Carbon Dioxide Level


 26 MMOL/L


(21-32)


 


Anion Gap


 10 mmol/L


(5-15)


 


Blood Urea Nitrogen


 26 mg/dL


(7-18)  H


 


Creatinine


 0.7 MG/DL


(0.55-1.30)


 


Estimat Glomerular Filtration


Rate > 60 mL/min


(>60)


 


Glucose Level


 283 MG/DL


()  H


 


Calcium Level


 7.2 MG/DL


(8.5-10.1)  L


 


Phosphorus Level


 2.4 MG/DL


(2.5-4.9)  L


 


Magnesium Level


 1.9 MG/DL


(1.8-2.4)


 


Iron Level


 28 ug/dL


()  L


 


Total Iron Binding Capacity


 126 ug/dL


(250-450)  L


 


Percent Iron Saturation 22 % (15-50)  


 


Unsaturated Iron Binding


 98 ug/dL


(112-346)  L


 


Ferritin


 369 NG/ML


(8-388)


 


Total Bilirubin


 0.5 MG/DL


(0.2-1.0)


 


Aspartate Amino Transf


(AST/SGOT) 16 U/L (15-37)





 


Alanine Aminotransferase


(ALT/SGPT) 41 U/L (12-78)





 


Alkaline Phosphatase


 53 U/L


()


 


C-Reactive Protein,


Quantitative 1.9 mg/dL


(0.00-0.90)  H


 


Pro-B-Type Natriuretic Peptide


 6967 pg/mL


(0-125)  H


 


Total Protein


 4.7 G/DL


(6.4-8.2)  L


 


Albumin


 1.3 G/DL


(3.4-5.0)  L


 


Globulin 3.4 g/dL  


 


Albumin/Globulin Ratio


 0.4 (1.0-2.7)


L











Current Medications








 Medications


  (Trade)  Dose


 Ordered  Sig/Didi


 Route


 PRN Reason  Start Time


 Stop Time Status Last Admin


Dose Admin


 


 Acetaminophen


  (Tylenol)  650 mg  Q4H  PRN


 GT


 FEVER  8/26/20 11:45


 9/25/20 11:44  9/7/20 03:17





 


 Chlorhexidine


 Gluconate


  (Beatrice-Hex 2%)  1 applic  DAILY@2000


 TOPIC


   8/31/20 20:00


 11/29/20 19:59  9/17/20 20:53





 


 Clotrimazole


  (Lotrimin)  1 applic  Q12HR


 TOPIC


   8/30/20 13:00


 11/28/20 12:59  9/18/20 08:07





 


 Dextrose


  (Dextrose 50%)  25 ml  Q30M  PRN


 IV


 Hypoglycemia  8/26/20 11:30


 11/20/20 11:29   





 


 Dextrose


  (Dextrose 50%)  50 ml  Q30M  PRN


 IV


 Hypoglycemia  8/26/20 11:30


 11/20/20 11:29   





 


 Dopamine HCl/


 Dextrose  250 ml @ 0


 mls/hr  Q24H


 IV


   9/4/20 11:15


 12/3/20 11:14  9/6/20 19:47





 


 Hydrocortisone


  (Solu-CORTEF)  100 mg  EVERY 8  HOURS


 IV


   8/30/20 14:00


 11/28/20 13:59  9/18/20 05:44





 


 Insulin Aspart


  (NovoLOG)    Q6HR


 SUBQ


   9/18/20 12:00


 12/17/20 11:59   





 


 Insulin Detemir


  (Levemir)  10 units  Q12HR


 SUBQ


   9/18/20 10:00


 12/17/20 09:59  9/18/20 10:36





 


 Levothyroxine


 Sodium


  (Synthroid)  75 mcg  DAILY


 GT


   8/27/20 09:00


 9/22/20 08:59  9/18/20 08:06





 


 Loperamide HCl


  (Imodium)  2 mg  Q6H  PRN


 NG


 Diarrhea  9/16/20 10:30


 10/16/20 10:29   





 


 Midodrine


  (Pro-Amatine)  10 mg  Q8HR


 GT


   8/28/20 14:00


 11/26/20 13:59  9/18/20 05:44





 


 Norepinephrine


 Bitartrate 16 mg/


 Dextrose  500 ml @ 0


 mls/hr  Q24H


 IV


   8/31/20 07:45


 9/29/20 12:59  9/4/20 08:43





 


 Ondansetron HCl


  (Zofran)  4 mg  Q6H  PRN


 IVP


 Nausea & Vomiting  8/26/20 12:00


 9/21/20 11:59   





 


 Pantoprazole


  (Protonix)  40 mg  DAILY


 IV


   8/27/20 09:00


 9/22/20 08:59  9/18/20 08:07





 


 Phenylephrine HCl


 50 mg/Dextrose  250 ml @ 0


 mls/hr  Q24H  PRN


 IV


 For hypotension  8/29/20 17:45


 9/28/20 17:44  8/31/20 01:49





 


 Polyethylene


 Glycol


  (Miralax)  17 gm  DAILYPRN  PRN


 GT


 Constipation  8/26/20 12:00


 9/21/20 11:59   





 


 Potassium


 Phosphate  250 ml @ 


 62.5 mls/hr  Q4H


 IVPB


   9/18/20 09:00


 9/18/20 16:59  9/18/20 09:31





 


 Valproic Acid


  (Depakene)  500 mg  EVERY 8  HOURS


 GT


   8/26/20 14:00


 9/21/20 21:59  9/18/20 05:44




















Rema Gomes M.D.            Sep 18, 2020 12:23

## 2020-09-18 NOTE — INTERNAL MED PROGRESS NOTE
Subjective


Physician Name


Tyson Hung


Attending Physician


Chano Cherry MD





Current Medications








 Medications


  (Trade)  Dose


 Ordered  Sig/Didi


 Route


 PRN Reason  Start Time


 Stop Time Status Last Admin


Dose Admin


 


 Acetaminophen


  (Tylenol)  650 mg  Q4H  PRN


 GT


 FEVER  8/26/20 11:45


 9/25/20 11:44  9/18/20 12:48





 


 Chlorhexidine


 Gluconate


  (Beatrice-Hex 2%)  1 applic  DAILY@2000


 TOPIC


   8/31/20 20:00


 11/29/20 19:59  9/17/20 20:53





 


 Clotrimazole


  (Lotrimin)  1 applic  Q12HR


 TOPIC


   8/30/20 13:00


 11/28/20 12:59  9/18/20 08:07





 


 Dextrose


  (Dextrose 50%)  25 ml  Q30M  PRN


 IV


 Hypoglycemia  8/26/20 11:30


 11/20/20 11:29   





 


 Dextrose


  (Dextrose 50%)  50 ml  Q30M  PRN


 IV


 Hypoglycemia  8/26/20 11:30


 11/20/20 11:29   





 


 Dopamine HCl/


 Dextrose  250 ml @ 0


 mls/hr  Q24H


 IV


   9/4/20 11:15


 12/3/20 11:14  9/6/20 19:47





 


 Hydrocortisone


  (Solu-CORTEF)  100 mg  EVERY 8  HOURS


 IV


   8/30/20 14:00


 11/28/20 13:59  9/18/20 14:05





 


 Insulin Aspart


  (NovoLOG)    Q6HR


 SUBQ


   9/18/20 12:00


 12/17/20 11:59  9/18/20 17:23





 


 Insulin Detemir


  (Levemir)  10 units  Q12HR


 SUBQ


   9/18/20 10:00


 12/17/20 09:59  9/18/20 10:36





 


 Levothyroxine


 Sodium


  (Synthroid)  75 mcg  DAILY


 GT


   8/27/20 09:00


 9/22/20 08:59  9/18/20 08:06





 


 Loperamide HCl


  (Imodium)  2 mg  Q6H  PRN


 NG


 Diarrhea  9/16/20 10:30


 10/16/20 10:29   





 


 Midodrine


  (Pro-Amatine)  10 mg  Q8HR


 GT


   8/28/20 14:00


 11/26/20 13:59  9/18/20 14:05





 


 Norepinephrine


 Bitartrate 16 mg/


 Dextrose  500 ml @ 0


 mls/hr  Q24H


 IV


   8/31/20 07:45


 9/29/20 12:59  9/4/20 08:43





 


 Ondansetron HCl


  (Zofran)  4 mg  Q6H  PRN


 IVP


 Nausea & Vomiting  8/26/20 12:00


 9/21/20 11:59   





 


 Pantoprazole


  (Protonix)  40 mg  DAILY


 IV


   8/27/20 09:00


 9/22/20 08:59  9/18/20 08:07





 


 Phenylephrine HCl


 50 mg/Dextrose  250 ml @ 0


 mls/hr  Q24H  PRN


 IV


 For hypotension  8/29/20 17:45


 9/28/20 17:44  8/31/20 01:49





 


 Polyethylene


 Glycol


  (Miralax)  17 gm  DAILYPRN  PRN


 GT


 Constipation  8/26/20 12:00


 9/21/20 11:59   





 


 Valproic Acid


  (Depakene)  500 mg  EVERY 8  HOURS


 GT


   8/26/20 14:00


 9/21/20 21:59  9/18/20 14:05











Allergies:  


Coded Allergies:  


     No Known Allergies (Unverified , 1/8/19)


Subjective


in ICU, remained intubated on the vent, unable to follow command. WBC: 16.2, K: 

3.2





Objective





Last Vital Signs








  Date Time  Temp Pulse Resp B/P (MAP) Pulse Ox O2 Delivery O2 Flow Rate FiO2


 


9/18/20 17:00  61 26 126/54 (78) 100   


 


9/18/20 16:00        85


 


9/18/20 16:00 100.0       


 


9/18/20 16:00      Mechanical Ventilator  





      Mechanical Ventilator  











Laboratory Tests








Test


 9/18/20


04:10


 


White Blood Count


 16.2 K/UL


(4.8-10.8)  H


 


Red Blood Count


 2.31 M/UL


(4.20-5.40)  L


 


Hemoglobin


 7.5 G/DL


(12.0-16.0)  L


 


Hematocrit


 22.4 %


(37.0-47.0)  L


 


Mean Corpuscular Volume 97 FL (80-99)  


 


Mean Corpuscular Hemoglobin


 32.5 PG


(27.0-31.0)  H


 


Mean Corpuscular Hemoglobin


Concent 33.5 G/DL


(32.0-36.0)


 


Red Cell Distribution Width


 15.3 %


(11.6-14.8)  H


 


Platelet Count


 116 K/UL


(150-450)  L


 


Mean Platelet Volume


 9.6 FL


(6.5-10.1)


 


Neutrophils (%) (Auto)


 % (45.0-75.0)





 


Lymphocytes (%) (Auto)


 % (20.0-45.0)





 


Monocytes (%) (Auto)  % (1.0-10.0)  


 


Eosinophils (%) (Auto)  % (0.0-3.0)  


 


Basophils (%) (Auto)  % (0.0-2.0)  


 


Differential Total Cells


Counted 100  





 


Neutrophils % (Manual) 94 % (45-75)  H


 


Lymphocytes % (Manual) 4 % (20-45)  L


 


Monocytes % (Manual) 2 % (1-10)  


 


Eosinophils % (Manual) 0 % (0-3)  


 


Basophils % (Manual) 0 % (0-2)  


 


Band Neutrophils 0 % (0-8)  


 


Platelet Estimate Decreased  L


 


Platelet Morphology Normal  


 


Hypochromasia 1+  


 


Anisocytosis 1+  


 


Erythrocyte Sedimentation Rate


 50 MM/HR


(0-30)  H


 


Sodium Level


 150 MMOL/L


(136-145)  H


 


Potassium Level


 3.2 MMOL/L


(3.5-5.1)  L


 


Chloride Level


 114 MMOL/L


()  H


 


Carbon Dioxide Level


 26 MMOL/L


(21-32)


 


Anion Gap


 10 mmol/L


(5-15)


 


Blood Urea Nitrogen


 26 mg/dL


(7-18)  H


 


Creatinine


 0.7 MG/DL


(0.55-1.30)


 


Estimat Glomerular Filtration


Rate > 60 mL/min


(>60)


 


Glucose Level


 283 MG/DL


()  H


 


Calcium Level


 7.2 MG/DL


(8.5-10.1)  L


 


Phosphorus Level


 2.4 MG/DL


(2.5-4.9)  L


 


Magnesium Level


 1.9 MG/DL


(1.8-2.4)


 


Iron Level


 28 ug/dL


()  L


 


Total Iron Binding Capacity


 126 ug/dL


(250-450)  L


 


Percent Iron Saturation 22 % (15-50)  


 


Unsaturated Iron Binding


 98 ug/dL


(112-346)  L


 


Ferritin


 369 NG/ML


(8-388)


 


Total Bilirubin


 0.5 MG/DL


(0.2-1.0)


 


Aspartate Amino Transf


(AST/SGOT) 16 U/L (15-37)





 


Alanine Aminotransferase


(ALT/SGPT) 41 U/L (12-78)





 


Alkaline Phosphatase


 53 U/L


()


 


C-Reactive Protein,


Quantitative 1.9 mg/dL


(0.00-0.90)  H


 


Pro-B-Type Natriuretic Peptide


 6967 pg/mL


(0-125)  H


 


Total Protein


 4.7 G/DL


(6.4-8.2)  L


 


Albumin


 1.3 G/DL


(3.4-5.0)  L


 


Globulin 3.4 g/dL  


 


Albumin/Globulin Ratio


 0.4 (1.0-2.7)


L

















Intake and Output  


 


 9/17/20 9/18/20





 19:00 07:00


 


Intake Total 1680.000 ml 810 ml


 


Output Total 1035 ml 1080 ml


 


Balance 645.000 ml -270 ml


 


  


 


Free Water  90 ml


 


IV Total 960.000 ml 


 


Tube Feeding 720 ml 720 ml


 


Output Urine Total 935 ml 800 ml


 


Stool Total 100 ml 280 ml








Objective


General: remain intubated, unable to follow command, open eyes with deep 

stimuli.


HEENT: NCAT, sclera anicteric, PERRL, ET Tube.


Neck: Supple, no significant jugular venous distention, 


Lungs: mechanical breath sounds decreased air at the bases,  no Wheeze or Rales.


Heart: Regular rate and rhythm, normal S1/S2, no murmurs/gallops


Abdomen: soft, not tender, not distended. + bowel sounds, Morbid Obesity, PEG 

site intact.


Extremities: No Cyanosis , clubbing,  resolving upper extremities edema. left 

upper extremity PICC line.


Neuro: limited secondary to patient status, unable to follow command, bilateral 

upper and lower extremities contracted.





Assessment/Plan


Assessment/Plan


Problem List:  


(1) HCAP (healthcare-associated pneumonia)


(2) Sepsis


(3) Down's syndrome


(4) Dysphagia S/P PEG


(5) Seizure disorder


(6) Hypothyroidism


(7) Acute Hypoxemic respiratory failure


(8) Persistent, high grade bacteremia-  r/o endocarditis


(9) DAVID





Plan: 


Tolerated tube feeding @ 50 cc/hr


Follow-up with laboratory and cultures


Hydrocortisone 100 mg IV every 8


Continue to monitor off abx 


Kcl supplements


Poor prognosis











Tyson Hung MD                 Sep 18, 2020 18:14

## 2020-09-18 NOTE — NUR
NURSE NOTES: Rectal tube in place with no leakage. Bed bath given and linen changed. Pt 
repositioned and suctioned

## 2020-09-18 NOTE — NUR
NURSE NOTES:

PATIENT AWOKE, OPEN EYES, ABLE TO EYE CONTACT BUT DID NOT FOLLOW COMMANDS, ON ETT TO VENT, 
AC 26//FIO2 85%/PEEP 10, O2 SATURATION 98% NOTED, HR 60'S/MIN, SR NOTED, G TUBE INTACT 
AND PATENT, ONGOING VITAL AF 1.2 AT 50ML/HR, NO RESIDUE NOTED, KEPT HOB OVER 30 DEGREES, 
ASPIRATION AND SZ PRECAUTION, ABDOMEN SOFT, NON TENDER, RECTAL TUBE INTACT AND PATENT, BROWN 
COLOR STOOL OUTED, F/C INTACT AND PATENT, MARY COLOR URINE OUTED, PICC LINE TO LEFT UPPER 
ARM, INTACT AND PATENT, ON P200 BED, MADE LOWER BED POSITION, ON BED ALARM AND LOCKED, PLACE 
CALL LIGHT WITHIN REACH, WILL CONTINUE TO MONITOR.

## 2020-09-18 NOTE — PULMONOLGY CRITICAL CARE NOTE
Critical Care - Asmt/Plan


Problems:  


(1) Acute respiratory failure


(2) Bacteremia


(3) Pneumonia


(4) Sepsis


(5) HCAP (healthcare-associated pneumonia)


(6) Seizure disorder


(7) Down's syndrome


Respiratory:  monitor respiratory rate, adjust FIO2


Cardiac:  continue to monitor HR/BP


Renal:  F/U I&O, keep IV fluid


Infectious Disease:  check cultures


Gastrointestinal:  continue feedings/current rate, hold feedings


Endocrine:  monitor blood sugar


Hematologic:  monitor H/H


Neurologic:  PRN Morphine


Disposition:  keep in ICU


Notes Reviewed:  internist, renal


Discussed with:  nurses, consultants





Critical Care - Objective





Last 24 Hour Vital Signs








  Date Time  Temp Pulse Resp B/P (MAP) Pulse Ox O2 Delivery O2 Flow Rate FiO2


 


9/18/20 11:08  78 26     85


 


9/18/20 11:00  78 21 125/92 (103) 100   


 


9/18/20 10:00  72 24 132/86 (101) 100   


 


9/18/20 09:00  60 26 131/85 (100) 99   


 


9/18/20 08:00      Mechanical Ventilator  





      Mechanical Ventilator  


 


9/18/20 08:00  58      


 


9/18/20 08:00        85


 


9/18/20 08:00 99.3 70 22 139/69 (92) 99   


 


9/18/20 07:20  78 26     85


 


9/18/20 07:00  60 26 144/74 (97) 100   


 


9/18/20 06:00  61 26 133/69 (90) 98   


 


9/18/20 05:00  58 26 143/64 (90) 98   


 


9/18/20 04:00      Mechanical Ventilator  





      Mechanical Ventilator  


 


9/18/20 04:00 99.0 84 21 119/68 (85) 93   


 


9/18/20 04:00        85


 


9/18/20 04:00  70      


 


9/18/20 03:10  81 26     85


 


9/18/20 03:00  59 26 130/72 (91) 97   


 


9/18/20 02:00  62 25 133/72 (92) 93   


 


9/18/20 01:00  73 22 128/66 (86) 96   


 


9/18/20 00:00      Mechanical Ventilator  





      Mechanical Ventilator  


 


9/18/20 00:00  75      


 


9/18/20 00:00 99.2 68 23 130/97 (108) 86   


 


9/18/20 00:00        85


 


9/17/20 23:10  73 26     85


 


9/17/20 23:00  58 26 125/71 (89) 97   


 


9/17/20 22:00  77 23 123/51 (75) 99   


 


9/17/20 21:00  73 19 108/50 (69) 94   


 


9/17/20 20:00 99.0 68 21 99/51 (67) 98   


 


9/17/20 20:00        85


 


9/17/20 20:00      Mechanical Ventilator  





      Mechanical Ventilator  


 


9/17/20 19:15  67 26     85


 


9/17/20 19:00  65 20 122/84 (97) 100   


 


9/17/20 18:00  66 18 119/69 (86) 100   


 


9/17/20 17:00  58 26 116/54 (74) 97   


 


9/17/20 16:00  74 34 131/74 (93) 96   


 


9/17/20 16:00        85


 


9/17/20 16:00 99.0 74 34 131/74 (93) 96   


 


9/17/20 16:00      Mechanical Ventilator  





      Mechanical Ventilator  


 


9/17/20 16:00  74      


 


9/17/20 15:30  74 26     85


 


9/17/20 15:00  62 21 130/62 (84) 99   


 


9/17/20 14:00  61 25 116/70 (85) 99   


 


9/17/20 14:00  61 21 116/70 (85) 98   


 


9/17/20 13:00  50 26 129/58 (81) 98   


 


9/17/20 12:00      Mechanical Ventilator  





      Mechanical Ventilator  


 


9/17/20 12:00        85


 


9/17/20 12:00 98.7 50 23 143/57 (85) 98   








Status:  awake


Condition:  critical


HEENT:  atraumatic


Neck:  full ROM


Lungs:  clear


Heart:  HR/BP stable


Abdomen:  soft


Extremities:  no C/C/E


Accucheck:  264





Critical Care - Subjective


ROS Limited/Unobtainable:  Yes


Condition:  grave


EKG Rhythm:  Sinus Rhythm


FI02:  85


Vent Support Breath Rate:  26


Vent Support Mode:  AC


Vent Tidal Volume:  500


Sputum Amount:  Small


PEEP:  10.0


PIP:  35


Tube Feeding Amount:  50


I&O:











Intake and Output  


 


 9/17/20 9/18/20





 19:00 07:00


 


Intake Total 1680.000 ml 810 ml


 


Output Total 1035 ml 1080 ml


 


Balance 645.000 ml -270 ml


 


  


 


Free Water  90 ml


 


IV Total 960.000 ml 


 


Tube Feeding 720 ml 720 ml


 


Output Urine Total 935 ml 800 ml


 


Stool Total 100 ml 280 ml








CXR:


no change


ET-Tube:  7.5


ET Position:  22


Labs:





Laboratory Tests








Test


 9/18/20


04:10


 


White Blood Count


 16.2 K/UL


(4.8-10.8)  H


 


Red Blood Count


 2.31 M/UL


(4.20-5.40)  L


 


Hemoglobin


 7.5 G/DL


(12.0-16.0)  L


 


Hematocrit


 22.4 %


(37.0-47.0)  L


 


Mean Corpuscular Volume 97 FL (80-99)  


 


Mean Corpuscular Hemoglobin


 32.5 PG


(27.0-31.0)  H


 


Mean Corpuscular Hemoglobin


Concent 33.5 G/DL


(32.0-36.0)


 


Red Cell Distribution Width


 15.3 %


(11.6-14.8)  H


 


Platelet Count


 116 K/UL


(150-450)  L


 


Mean Platelet Volume


 9.6 FL


(6.5-10.1)


 


Neutrophils (%) (Auto)


 % (45.0-75.0)





 


Lymphocytes (%) (Auto)


 % (20.0-45.0)





 


Monocytes (%) (Auto)  % (1.0-10.0)  


 


Eosinophils (%) (Auto)  % (0.0-3.0)  


 


Basophils (%) (Auto)  % (0.0-2.0)  


 


Differential Total Cells


Counted 100  





 


Neutrophils % (Manual) 94 % (45-75)  H


 


Lymphocytes % (Manual) 4 % (20-45)  L


 


Monocytes % (Manual) 2 % (1-10)  


 


Eosinophils % (Manual) 0 % (0-3)  


 


Basophils % (Manual) 0 % (0-2)  


 


Band Neutrophils 0 % (0-8)  


 


Platelet Estimate Decreased  L


 


Platelet Morphology Normal  


 


Hypochromasia 1+  


 


Anisocytosis 1+  


 


Erythrocyte Sedimentation Rate


 50 MM/HR


(0-30)  H


 


Sodium Level


 150 MMOL/L


(136-145)  H


 


Potassium Level


 3.2 MMOL/L


(3.5-5.1)  L


 


Chloride Level


 114 MMOL/L


()  H


 


Carbon Dioxide Level


 26 MMOL/L


(21-32)


 


Anion Gap


 10 mmol/L


(5-15)


 


Blood Urea Nitrogen


 26 mg/dL


(7-18)  H


 


Creatinine


 0.7 MG/DL


(0.55-1.30)


 


Estimat Glomerular Filtration


Rate > 60 mL/min


(>60)


 


Glucose Level


 283 MG/DL


()  H


 


Calcium Level


 7.2 MG/DL


(8.5-10.1)  L


 


Phosphorus Level


 2.4 MG/DL


(2.5-4.9)  L


 


Magnesium Level


 1.9 MG/DL


(1.8-2.4)


 


Iron Level


 28 ug/dL


()  L


 


Total Iron Binding Capacity


 126 ug/dL


(250-450)  L


 


Percent Iron Saturation 22 % (15-50)  


 


Unsaturated Iron Binding


 98 ug/dL


(112-346)  L


 


Ferritin


 369 NG/ML


(8-388)


 


Total Bilirubin


 0.5 MG/DL


(0.2-1.0)


 


Aspartate Amino Transf


(AST/SGOT) 16 U/L (15-37)





 


Alanine Aminotransferase


(ALT/SGPT) 41 U/L (12-78)





 


Alkaline Phosphatase


 53 U/L


()


 


C-Reactive Protein,


Quantitative 1.9 mg/dL


(0.00-0.90)  H


 


Pro-B-Type Natriuretic Peptide


 6967 pg/mL


(0-125)  H


 


Total Protein


 4.7 G/DL


(6.4-8.2)  L


 


Albumin


 1.3 G/DL


(3.4-5.0)  L


 


Globulin 3.4 g/dL  


 


Albumin/Globulin Ratio


 0.4 (1.0-2.7)


L

















Chano Cherry MD              Sep 18, 2020 11:40

## 2020-09-18 NOTE — NUR
NURSE HAND-OFF REPORT: 

Latest Vital Signs: Temperature 100.0 , Pulse 71 , B/P 129 /61 , Respiratory Rate 26 , O2 
SAT 99 , Mechanical Ventilator, O2 Flow Rate 15.0 .  

Vital Sign Comment: 



EKG Rhythm: Sinus Rhythm

Rhythm change?: N 

MD Notified?: -

MD Response: 



Latest Momin Fall Score: 70  

Fall Risk: High Risk 

Safety Measures: Call light Within Reach, Bed Alarm Zone 1, Side Rails Side Rails x3, Bed 
position Low and Locked.

Fall Precautions: 

Yellow Socks

Door Sign

Patient Fall Education



Report given to LAYTON Gastelum.

## 2020-09-18 NOTE — NUR
NURSE NOTES: Previous bag not finished of potassium phosphate. Spoke with pharmacist who 
said okay to give late when 1st bag finished.

## 2020-09-18 NOTE — NEPHROLOGY PROGRESS NOTE
Assessment/Plan


Problem List:  


(1) DAVID (acute kidney injury)


(2) Respiratory failure requiring intubation


(3) Down's syndrome


(4) Seizure disorder


(5) Hypothyroidism


Assessment





Acute renal failure, likely due to hypotension


Acute respiratory distress, hypoxia


Seizure disorder


Hypothyroidism


Down syndrome


Full code











Fluid challenge with IV fluids and albumin


Midodrine for BP above 100 systolic


Check TSH level


Check


Correct level


Monitor renal parameters


Urine studies


Per orders


Plan


September 18: Day 27 of hospitalization.  Full code.  Labs reviewed.  Hemoglobin

down to 7.5.  Electrolyte abnormalities addressed and corrections ordered.  

Continue to monitor renal parameters.  Continue per consultants.  Start on 

Levemir for blood sugar management.  Questioning continuation of hydrocortisone?


September 17: Labs reviewed.  Potassium, phosphorus, hemoglobin, are all low.  

Potassium and phosphorus IV replacement given.  Continue to monitor electrolytes

and CBC.  Patient remains full code.


September 16: Lab reviewed.  Low phosphorus low magnesium and low potassium was 

addressed.  Hemoglobin drifting lower.  Continue per consultants.  Patient 

remains full code.


September 15: Labs reviewed.  Patient continues to be on ventilator.  D5W for 

high sodium and also potassium chloride intravenously as supplement given.  Hem

oglobin 8.4.  Continue to monitor electrolytes and renal parameters.


September 14: Labs reviewed.  Low potassium and high sodium noted.  Hemoglobin 

8.1 stable.  Aim to correct abnormal electrolyte.  Continue rest.  Will give 2 

boluses of D5W 500 cc.


September 13: Lab reviewed.  Abnormal electrolytes noted and addressed.


September 12: Labs reviewed.  Potassium supplement given.  Patient remains full 

code.  Continue per consultants.


September 11: Lab reviewed.  Electrolyte abnormalities addressed.  Continue per 

pulmonary and ID.


September 10: Lab reviewed.  Status unchanged.  Serum sodium 151 unchanged.  

Stable from renal standpoint of view.


September 9: Labs reviewed.  Status quo.  D5W 500 cc IV ordered.  Continue to 

monitor renal parameters.


September 8: Status quo.  Labs reviewed.  Overall condition unchanged.  Patient 

was transfused and hemoglobin higher.  Continue current management.  Patient 

remains full code.


September 7: Status quo.  Overall condition poor.  Very low albumin.  Edematous.

 Hypotensive.  Hemoglobin lower.  Anemia work-up ordered.  I favor transfusion 2

units of packed RBCs.  Patient remains full code.  I favor supportive care only.

 Will discuss.


September 6: Electrolyte abnormalities addressed.  Serum creatinine lower.  

Continue per current management.


September 5: Status unchanged.  Lab reviewed.  Serum potassium 2.7.  IV 

potassium chloride ordered.  Serum creatinine low at 1.6 stable.  Blood pressure

90s systolic


September 4: Status quo.  Labs reviewed.  Renal parameters stable.  Serum c

reatinine down to 1.6.  Medication list reviewed.  Continues to be on midodrine.

 Continue per consultants.


September 3: Status quo.  Labs reviewed.  Electrolytes adjusted.  Serum 

creatinine down to 1.8.  Continue per consultants.


September 2: Status quo.  Labs reviewed.  Phosphorus supplement IV given.  Serum

creatinine 2.  Continue per consultants.


September 1: Requires less pressors.  Albumin bolus given.  1 dose of Lasix IV 

ordered as the patient severely edematous.  Patient serum albumin is very low.  

Continue per consultants.


August 31: Continues to be intubated.  Labs reviewed.  Serum creatinine 1.9 

unchanged.  Blood pressure more stable.  Off 1 of the pressors.  Continue to 

monitor renal parameters.  Continue per consultants.  Patient now on hydrocor

tisone 100 mg every 8 hours.  Will decrease IV fluid.  Normal saline down to 50 

cc an hour.


August 30: Intubated.  Labs reviewed.  Creatinine 1.9 unchanged.  Continue same 

treatment plan.  Per consultants.  Overall poor prognosis since the patient 

remains on pressors and her pulmonary status is worsening.


August 29: Remains intubated.  Labs reviewed.  Creatinine 1.9.  Blood pressure 

systolic 90s.  Continue per consultants.


August 28: Remains intubated.  Labs reviewed.  Serum creatinine lower to 2.  

Vancomycin level lower.  Remains hypotensive on pressors.  Will increase 

midodrine to 10 mg every 8 hours.  Continue per consultants.  Continue to 

monitor renal parameters.


August 27: Patient now in ICU.  Intubated.  On pressors.  Labs reviewed.  Will 

increase midodrine.  Aim to keep blood pressure over 100 systolic.  Will give 

albumin bolus.  Will check vancomycin level which was elevated when checked 

previously on August 24.  Will monitor renal parameters.  Continue per 

consultants.





Subjective


ROS Limited/Unobtainable:  Yes





Objective


Objective





Last 24 Hour Vital Signs








  Date Time  Temp Pulse Resp B/P (MAP) Pulse Ox O2 Delivery O2 Flow Rate FiO2


 


9/18/20 08:00        85


 


9/18/20 07:20  78 26     85


 


9/18/20 06:00  61 26 133/69 (90) 98   


 


9/18/20 05:00  58 26 143/64 (90) 98   


 


9/18/20 04:00      Mechanical Ventilator  





      Mechanical Ventilator  


 


9/18/20 04:00 99.0 84 21 119/68 (85) 93   


 


9/18/20 04:00        85


 


9/18/20 04:00  70      


 


9/18/20 03:10  81 26     85


 


9/18/20 03:00  59 26 130/72 (91) 97   


 


9/18/20 02:00  62 25 133/72 (92) 93   


 


9/18/20 01:00  73 22 128/66 (86) 96   


 


9/18/20 00:00      Mechanical Ventilator  





      Mechanical Ventilator  


 


9/18/20 00:00  75      


 


9/18/20 00:00 99.2 68 23 130/97 (108) 86   


 


9/18/20 00:00        85


 


9/17/20 23:10  73 26     85


 


9/17/20 23:00  58 26 125/71 (89) 97   


 


9/17/20 22:00  77 23 123/51 (75) 99   


 


9/17/20 21:00  73 19 108/50 (69) 94   


 


9/17/20 20:00 99.0 68 21 99/51 (67) 98   


 


9/17/20 20:00        85


 


9/17/20 20:00      Mechanical Ventilator  





      Mechanical Ventilator  


 


9/17/20 19:15  67 26     85


 


9/17/20 19:00  65 20 122/84 (97) 100   


 


9/17/20 18:00  66 18 119/69 (86) 100   


 


9/17/20 17:00  58 26 116/54 (74) 97   


 


9/17/20 16:00  74 34 131/74 (93) 96   


 


9/17/20 16:00        85


 


9/17/20 16:00 99.0 74 34 131/74 (93) 96   


 


9/17/20 16:00      Mechanical Ventilator  





      Mechanical Ventilator  


 


9/17/20 16:00  74      


 


9/17/20 15:30  74 26     85


 


9/17/20 15:00  62 21 130/62 (84) 99   


 


9/17/20 14:00  61 25 116/70 (85) 99   


 


9/17/20 14:00  61 21 116/70 (85) 98   


 


9/17/20 13:00  50 26 129/58 (81) 98   


 


9/17/20 12:00      Mechanical Ventilator  





      Mechanical Ventilator  


 


9/17/20 12:00        85


 


9/17/20 12:00 98.7 50 23 143/57 (85) 98   


 


9/17/20 11:39  49      


 


9/17/20 11:00  48 26 121/45 (70) 97   


 


9/17/20 10:43    115/59    


 


9/17/20 10:38  55 26     85


 


9/17/20 10:00  48 26 115/59 (77) 98   

















Intake and Output  


 


 9/17/20 9/18/20





 19:00 07:00


 


Intake Total 1680.000 ml 810 ml


 


Output Total 1035 ml 1080 ml


 


Balance 645.000 ml -270 ml


 


  


 


Free Water  90 ml


 


IV Total 960.000 ml 


 


Tube Feeding 720 ml 720 ml


 


Output Urine Total 935 ml 800 ml


 


Stool Total 100 ml 280 ml








Laboratory Tests


9/18/20 04:10: 


White Blood Count 16.2H, Red Blood Count 2.31L, Hemoglobin 7.5L, Hematocrit 

22.4L, Mean Corpuscular Volume 97, Mean Corpuscular Hemoglobin 32.5H, Mean 

Corpuscular Hemoglobin Concent 33.5, Red Cell Distribution Width 15.3H, Platelet

Count 116L, Mean Platelet Volume 9.6, Neutrophils (%) (Auto) , Lymphocytes (%) 

(Auto) , Monocytes (%) (Auto) , Eosinophils (%) (Auto) , Basophils (%) (Auto) , 

Differential Total Cells Counted 100, Neutrophils % (Manual) 94H, Lymphocytes % 

(Manual) 4L, Monocytes % (Manual) 2, Eosinophils % (Manual) 0, Basophils % 

(Manual) 0, Band Neutrophils 0, Platelet Estimate DecreasedL, Platelet 

Morphology Normal, Hypochromasia 1+, Anisocytosis 1+, Erythrocyte Sedimentation 

Rate 50H, Sodium Level 150H, Potassium Level 3.2L, Chloride Level 114H, Carbon 

Dioxide Level 26, Anion Gap 10, Blood Urea Nitrogen 26H, Creatinine 0.7, Estimat

Glomerular Filtration Rate > 60, Glucose Level 283H, Calcium Level 7.2L, 

Phosphorus Level 2.4L, Magnesium Level 1.9, Iron Level 28L, Total Iron Binding 

Capacity 126L, Percent Iron Saturation 22, Unsaturated Iron Binding 98L, 

Ferritin [Pending], Total Bilirubin 0.5, Aspartate Amino Transf (AST/SGOT) 16, 

Alanine Aminotransferase (ALT/SGPT) 41, Alkaline Phosphatase 53, C-Reactive 

Protein, Quantitative 1.9H, Pro-B-Type Natriuretic Peptide 6967H, Total Protein 

4.7L, Albumin 1.3L, Globulin 3.4, Albumin/Globulin Ratio 0.4L


Height (Feet):  5


Height (Inches):  3.00


Weight (Pounds):  172


General Appearance:  no apparent distress


Neck:  other - On mechanical ventilator


Cardiovascular:  normal rate


Respiratory/Chest:  decreased breath sounds


Abdomen:  distended











Lam Nino MD            Sep 18, 2020 09:08

## 2020-09-18 NOTE — NUR
RD ASSESSMENT & RECOMMENDATIONS

SEE CARE ACTIVITY FOR COMPLETE ASSESSMENT



DAILY ESTIMATED NEEDS:

Needs based on CRITICAL CARE, 50kg  

22-28  kcals/kg 

7720-8361  total kcals

1.25-2  g protein/kg

  g total protein 

25-30  mL/kg

9717-4982  total fluid mLs



NUTRITION DIAGNOSIS:

Swallowing difficulty r/t dysphagia as evidenced by pt w/ Downs Syndrome,

PEG dep for all nutritional needs, now intubated, now off pressor support,

ICU status.



 

CURRENT TF:Vital AF 1.2 @ 60ml/hr x 22 hrs  



 

ENTERAL NUTRITION RECOMMENDATIONS:

VITAL AF 1.2 @ 50ml/hr x22 hrs   to provide 1100ml, 1320 kcal, 83g pro, 892ml free H2O  



- Maintain Vital AF for carb control, critical care, & elemental feeds for diarrhea

- LOWER goal rate to 50ml/hr not to exceed est kcal needs, also for improved BG control and 
help alleviate diarrhea

      -> will meet 100% est kcal/prot needs

- Hold TF 1 hr before and after synthroid meds.

- Flush per MD, HOB over 30 degrees.



 



ADDITIONAL RECOMMENDATIONS:

1) Ht of 61 inches per SNF; recalibrate bed scale for accurate CBW  

2) Add NISS: BGs now in the 200's, on Solucortef 

3) Monitor hemodynamic stability: pressors held at this time 

4) Wound healing: Continue Vit C 250mg QD + Marcio BID 

5) Monitor lytes, replete as needed (low K and phos) 

6) Add probiotics for diarrhea 

.

## 2020-09-19 VITALS — SYSTOLIC BLOOD PRESSURE: 118 MMHG | DIASTOLIC BLOOD PRESSURE: 60 MMHG

## 2020-09-19 VITALS — DIASTOLIC BLOOD PRESSURE: 62 MMHG | SYSTOLIC BLOOD PRESSURE: 130 MMHG

## 2020-09-19 VITALS — DIASTOLIC BLOOD PRESSURE: 60 MMHG | SYSTOLIC BLOOD PRESSURE: 116 MMHG

## 2020-09-19 VITALS — SYSTOLIC BLOOD PRESSURE: 128 MMHG | DIASTOLIC BLOOD PRESSURE: 57 MMHG

## 2020-09-19 VITALS — SYSTOLIC BLOOD PRESSURE: 108 MMHG | DIASTOLIC BLOOD PRESSURE: 59 MMHG

## 2020-09-19 VITALS — SYSTOLIC BLOOD PRESSURE: 98 MMHG | DIASTOLIC BLOOD PRESSURE: 53 MMHG

## 2020-09-19 VITALS — DIASTOLIC BLOOD PRESSURE: 70 MMHG | SYSTOLIC BLOOD PRESSURE: 137 MMHG

## 2020-09-19 VITALS — DIASTOLIC BLOOD PRESSURE: 65 MMHG | SYSTOLIC BLOOD PRESSURE: 157 MMHG

## 2020-09-19 VITALS — SYSTOLIC BLOOD PRESSURE: 115 MMHG | DIASTOLIC BLOOD PRESSURE: 64 MMHG

## 2020-09-19 VITALS — DIASTOLIC BLOOD PRESSURE: 67 MMHG | SYSTOLIC BLOOD PRESSURE: 123 MMHG

## 2020-09-19 VITALS — DIASTOLIC BLOOD PRESSURE: 62 MMHG | SYSTOLIC BLOOD PRESSURE: 135 MMHG

## 2020-09-19 VITALS — DIASTOLIC BLOOD PRESSURE: 60 MMHG | SYSTOLIC BLOOD PRESSURE: 117 MMHG

## 2020-09-19 VITALS — SYSTOLIC BLOOD PRESSURE: 106 MMHG | DIASTOLIC BLOOD PRESSURE: 59 MMHG

## 2020-09-19 VITALS — SYSTOLIC BLOOD PRESSURE: 138 MMHG | DIASTOLIC BLOOD PRESSURE: 64 MMHG

## 2020-09-19 VITALS — DIASTOLIC BLOOD PRESSURE: 68 MMHG | SYSTOLIC BLOOD PRESSURE: 134 MMHG

## 2020-09-19 VITALS — SYSTOLIC BLOOD PRESSURE: 132 MMHG | DIASTOLIC BLOOD PRESSURE: 76 MMHG

## 2020-09-19 VITALS — SYSTOLIC BLOOD PRESSURE: 131 MMHG | DIASTOLIC BLOOD PRESSURE: 62 MMHG

## 2020-09-19 VITALS — SYSTOLIC BLOOD PRESSURE: 149 MMHG | DIASTOLIC BLOOD PRESSURE: 60 MMHG

## 2020-09-19 VITALS — SYSTOLIC BLOOD PRESSURE: 112 MMHG | DIASTOLIC BLOOD PRESSURE: 61 MMHG

## 2020-09-19 VITALS — DIASTOLIC BLOOD PRESSURE: 68 MMHG | SYSTOLIC BLOOD PRESSURE: 145 MMHG

## 2020-09-19 VITALS — SYSTOLIC BLOOD PRESSURE: 143 MMHG | DIASTOLIC BLOOD PRESSURE: 65 MMHG

## 2020-09-19 VITALS — SYSTOLIC BLOOD PRESSURE: 144 MMHG | DIASTOLIC BLOOD PRESSURE: 79 MMHG

## 2020-09-19 VITALS — SYSTOLIC BLOOD PRESSURE: 138 MMHG | DIASTOLIC BLOOD PRESSURE: 84 MMHG

## 2020-09-19 VITALS — SYSTOLIC BLOOD PRESSURE: 129 MMHG | DIASTOLIC BLOOD PRESSURE: 70 MMHG

## 2020-09-19 LAB
ADD MANUAL DIFF: YES
ALBUMIN SERPL-MCNC: 1.3 G/DL (ref 3.4–5)
ALBUMIN/GLOB SERPL: 0.4 {RATIO} (ref 1–2.7)
ALP SERPL-CCNC: 46 U/L (ref 46–116)
ALT SERPL-CCNC: 36 U/L (ref 12–78)
ANION GAP SERPL CALC-SCNC: 8 MMOL/L (ref 5–15)
AST SERPL-CCNC: 18 U/L (ref 15–37)
BILIRUB SERPL-MCNC: 0.4 MG/DL (ref 0.2–1)
BUN SERPL-MCNC: 24 MG/DL (ref 7–18)
CALCIUM SERPL-MCNC: 7.2 MG/DL (ref 8.5–10.1)
CHLORIDE SERPL-SCNC: 116 MMOL/L (ref 98–107)
CO2 SERPL-SCNC: 29 MMOL/L (ref 21–32)
CREAT SERPL-MCNC: 0.7 MG/DL (ref 0.55–1.3)
ERYTHROCYTE [DISTWIDTH] IN BLOOD BY AUTOMATED COUNT: 15.9 % (ref 11.6–14.8)
GLOBULIN SER-MCNC: 3.3 G/DL
HCT VFR BLD CALC: 21.3 % (ref 37–47)
HGB BLD-MCNC: 7.1 G/DL (ref 12–16)
MCV RBC AUTO: 98 FL (ref 80–99)
PHOSPHATE SERPL-MCNC: 2.4 MG/DL (ref 2.5–4.9)
PLATELET # BLD: 101 K/UL (ref 150–450)
POTASSIUM SERPL-SCNC: 2.5 MMOL/L (ref 3.5–5.1)
RBC # BLD AUTO: 2.18 M/UL (ref 4.2–5.4)
SODIUM SERPL-SCNC: 154 MMOL/L (ref 136–145)
WBC # BLD AUTO: 14.1 K/UL (ref 4.8–10.8)

## 2020-09-19 RX ADMIN — HYDROCORTISONE SODIUM SUCCINATE SCH MG: 100 INJECTION, POWDER, FOR SOLUTION INTRAMUSCULAR; INTRAVENOUS at 14:02

## 2020-09-19 RX ADMIN — MIDODRINE HYDROCHLORIDE SCH MG: 10 TABLET ORAL at 14:02

## 2020-09-19 RX ADMIN — INSULIN DETEMIR SCH UNITS: 100 INJECTION, SOLUTION SUBCUTANEOUS at 21:04

## 2020-09-19 RX ADMIN — HYDROCORTISONE SODIUM SUCCINATE SCH MG: 100 INJECTION, POWDER, FOR SOLUTION INTRAMUSCULAR; INTRAVENOUS at 20:57

## 2020-09-19 RX ADMIN — SODIUM CHLORIDE SCH MLS/HR: 900 INJECTION, SOLUTION INTRAVENOUS at 07:43

## 2020-09-19 RX ADMIN — INSULIN ASPART SCH UNITS: 100 INJECTION, SOLUTION INTRAVENOUS; SUBCUTANEOUS at 12:20

## 2020-09-19 RX ADMIN — HYDROCORTISONE SODIUM SUCCINATE SCH MG: 100 INJECTION, POWDER, FOR SOLUTION INTRAMUSCULAR; INTRAVENOUS at 05:33

## 2020-09-19 RX ADMIN — CHLORHEXIDINE GLUCONATE SCH APPLIC: 213 SOLUTION TOPICAL at 20:55

## 2020-09-19 RX ADMIN — MIDODRINE HYDROCHLORIDE SCH MG: 10 TABLET ORAL at 20:56

## 2020-09-19 RX ADMIN — INSULIN ASPART SCH UNITS: 100 INJECTION, SOLUTION INTRAVENOUS; SUBCUTANEOUS at 05:34

## 2020-09-19 RX ADMIN — VALPROIC ACID SCH MG: 250 SOLUTION ORAL at 05:33

## 2020-09-19 RX ADMIN — PANTOPRAZOLE SODIUM SCH MG: 40 INJECTION, POWDER, FOR SOLUTION INTRAVENOUS at 08:05

## 2020-09-19 RX ADMIN — INSULIN ASPART SCH UNITS: 100 INJECTION, SOLUTION INTRAVENOUS; SUBCUTANEOUS at 18:15

## 2020-09-19 RX ADMIN — DOPAMINE HYDROCHLORIDE IN DEXTROSE SCH MLS/HR: 1.6 INJECTION, SOLUTION INTRAVENOUS at 11:09

## 2020-09-19 RX ADMIN — INSULIN DETEMIR SCH UNITS: 100 INJECTION, SOLUTION SUBCUTANEOUS at 08:07

## 2020-09-19 RX ADMIN — VALPROIC ACID SCH MG: 250 SOLUTION ORAL at 20:56

## 2020-09-19 RX ADMIN — MIDODRINE HYDROCHLORIDE SCH MG: 10 TABLET ORAL at 05:33

## 2020-09-19 RX ADMIN — VALPROIC ACID SCH MG: 250 SOLUTION ORAL at 14:02

## 2020-09-19 NOTE — NUR
NURSE NOTES:

Patient is resting in bed comfortably, no signs of acute distress. Medications given as 
prescribed, turned and repositioned patient, oral care given. Will continue to monitor.

## 2020-09-19 NOTE — GENERAL PROGRESS NOTE
Subjective


ROS Limited/Unobtainable:  No


Allergies:  


Coded Allergies:  


     No Known Allergies (Unverified , 1/8/19)





Objective





Last 24 Hour Vital Signs








  Date Time  Temp Pulse Resp B/P (MAP) Pulse Ox O2 Delivery O2 Flow Rate FiO2


 


9/19/20 09:00  44 34 118/60 (79) 94   


 


9/19/20 08:05  48      


 


9/19/20 08:00 98.3 49 35 143/65 (91) 94   


 


9/19/20 08:00        75


 


9/19/20 08:00      Mechanical Ventilator  





      Mechanical Ventilator  


 


9/19/20 07:43    149/60    


 


9/19/20 07:00  49 18 149/60 (89) 97   


 


9/19/20 06:47  51 26     85


 


9/19/20 06:00  48 26 108/59 (75) 99   


 


9/19/20 05:00  46 26 115/64 (81) 99   


 


9/19/20 04:00 98.3 46 26 116/60 (78) 100   


 


9/19/20 04:00      Mechanical Ventilator  





      Mechanical Ventilator  


 


9/19/20 04:00        85


 


9/19/20 03:00  59 23 123/67 (85) 100   


 


9/19/20 02:30  78 26     85


 


9/19/20 02:00  68 23 132/76 (94) 99   


 


9/19/20 01:00  67 23 138/64 (88) 100   


 


9/19/20 00:07  57      


 


9/19/20 00:00        85


 


9/19/20 00:00 97.5 58 12 130/62 (84) 97   


 


9/19/20 00:00      Mechanical Ventilator  





      Mechanical Ventilator  


 


9/18/20 23:10  65 26     85


 


9/18/20 23:00  64 26 153/63 (93) 99   


 


9/18/20 22:00  61 26 119/58 (78) 99   


 


9/18/20 21:00  80 25 139/55 (83) 100   


 


9/18/20 20:00 100.1 72 25 124/57 (79) 100   


 


9/18/20 20:00  72      


 


9/18/20 20:00        85


 


9/18/20 20:00      Mechanical Ventilator  





      Mechanical Ventilator  


 


9/18/20 19:35  89 26     85


 


9/18/20 19:00  70 25 129/63 (85) 99   


 


9/18/20 18:00  71 26 129/61 (83) 99   


 


9/18/20 17:00  61 26 126/54 (78) 100   


 


9/18/20 16:00        85


 


9/18/20 16:00 100.0 69 26 133/55 (81) 100   


 


9/18/20 16:00  66      


 


9/18/20 16:00      Mechanical Ventilator  





      Mechanical Ventilator  


 


9/18/20 15:41  63 26     85


 


9/18/20 15:00  64 26 113/51 (71) 100   


 


9/18/20 14:00  93 30 109/88 (95) 99   


 


9/18/20 13:18 100.3       


 


9/18/20 13:00  96 24 130/60 (83) 98   


 


9/18/20 12:00  72      


 


9/18/20 12:00        85


 


9/18/20 12:00 100.3 64 26 115/49 (71) 98   


 


9/18/20 12:00      Mechanical Ventilator  





      Mechanical Ventilator  


 


9/18/20 11:08  78 26     85


 


9/18/20 11:00  78 21 125/92 (103) 100   

















Intake and Output  


 


 9/18/20 9/19/20





 19:00 07:00


 


Intake Total 1120 ml 800 ml


 


Output Total 1030 ml 1100 ml


 


Balance 90 ml -300 ml


 


  


 


IV Total 500 ml 


 


Tube Feeding 620 ml 600 ml


 


Other  200 ml


 


Output Urine Total 980 ml 1050 ml


 


Stool Total 50 ml 50 ml








Laboratory Tests


9/18/20 20:22: POC Whole Blood Glucose 236H


9/19/20 04:25: 


White Blood Count 14.1H, Red Blood Count 2.18L, Hemoglobin 7.1L, Hematocrit 

21.3L, Mean Corpuscular Volume 98, Mean Corpuscular Hemoglobin 32.7H, Mean 

Corpuscular Hemoglobin Concent 33.5, Red Cell Distribution Width 15.9H, Platelet

Count 101L, Mean Platelet Volume 8.5, Neutrophils (%) (Auto) , Lymphocytes (%) 

(Auto) , Monocytes (%) (Auto) , Eosinophils (%) (Auto) , Basophils (%) (Auto) , 

Neutrophils % (Manual) [Pending], Lymphocytes % (Manual) [Pending], Platelet 

Estimate [Pending], Platelet Morphology [Pending], Sodium Level 154H, Potassium 

Level 2.5*L, Chloride Level 116H, Carbon Dioxide Level 29, Anion Gap 8, Blood 

Urea Nitrogen 24H, Creatinine 0.7, Estimat Glomerular Filtration Rate > 60, 

Glucose Level 171#H, Calcium Level 7.2L, Phosphorus Level 2.4L, Magnesium Level 

1.9, Total Bilirubin 0.4, Aspartate Amino Transf (AST/SGOT) 18, Alanine 

Aminotransferase (ALT/SGPT) 36, Alkaline Phosphatase 46, C-Reactive Protein, 

Quantitative 1.5H, Pro-B-Type Natriuretic Peptide 15592X, Total Protein 4.6L, 

Albumin 1.3L, Globulin 3.3, Albumin/Globulin Ratio 0.4L


Height (Feet):  5


Height (Inches):  3.00


Weight (Pounds):  172


General Appearance:  lethargic


EENT:  normal ENT inspection


Neck:  supple


Cardiovascular:  normal rate


Respiratory/Chest:  decreased breath sounds


Abdomen:  non tender, soft, decreased bowel sounds


Extremities:  non-tender





Assessment/Plan


Assessment/Plan:


1. History of Down syndrome.


2. Dysphagia with G-tube.


3. Seizure disorder.


4. Hypothyroidism.


5. DAVID.


6. Pneumonia.


7. Sepsis.








fu H&H


prn blood transfusion to keep HGB above 7


ppi


GTF


hold GI procedures for now


stool ob + 1/2











Ted Nagy MD             Sep 19, 2020 10:06

## 2020-09-19 NOTE — NUR
NURSE HAND-OFF REPORT: 



Latest Vital Signs: Temperature 98.3 , Pulse 51 , B/P 108 /59 , Respiratory Rate 26 , O2 SAT 
99 , Mechanical Ventilator, O2 Flow Rate 15.0 .  

Vital Sign Comment: 



EKG Rhythm: Sinus Bradycardia

Rhythm change?: N 

MD Notified?: -

MD Response: 



Latest Momin Fall Score: 70  

Fall Risk: High Risk 

Safety Measures: Call light Within Reach, Bed Alarm Zone 1, Side Rails Side Rails x3, Bed 
position Low and Locked.

Fall Precautions: 

Yellow Socks

Door Sign

Patient Fall Education



Report given to LAYTON DE LA FUENTE.

## 2020-09-19 NOTE — NUR
NURSE NOTES:

Blood transfusion started on patient, Temperature 98.0 degrees Fahrenheit via axillary, 
Pulse 68, BP 98/53.

## 2020-09-19 NOTE — NUR
NURSE NOTES:

Bed bath given to patient, reinserted rectal tube. Turned and repositioned, will continue to 
monitor.

## 2020-09-19 NOTE — NUR
NURSE HAND-OFF REPORT: 



Latest Vital Signs: Temperature 98.1 , Pulse 43 , B/P 145 /68 , Respiratory Rate 26 , O2 SAT 
100 , Mechanical Ventilator, O2 Flow Rate 15.0 .  

Vital Sign Comment: 



EKG Rhythm: Sinus Bradycardia

Rhythm change?: N 

MD Notified?: -

MD Response: 



Latest Momin Fall Score: 70  

Fall Risk: High Risk 

Safety Measures: Call light Within Reach, Bed Alarm Zone 1, Side Rails Side Rails x3, Bed 
position Low and Locked.

Fall Precautions: 

Yellow Socks

Door Sign

Patient Fall Education



Report given to Finn TRAYLOR.

## 2020-09-19 NOTE — CARDIOLOGY PROGRESS NOTE
Assessment/Plan


Problem List:  


(1) Diarrhea


(2) Anemia


(3) Hypernatremia


(4) Hypokalemia


(5) Down's syndrome


(6) Acute respiratory failure


(7) Pneumonia


(8) Respiratory failure requiring intubation


Status:  stable, unchanged


Status Narrative


Pt w/ pneumonia, respiratory failure. previous s epi bacteremia


She has sinus bradycardia, while sedated, without hemodynamic compromise.


Diarrhea - ? cause.  C diff negative


Has hypokalemia, hypernatrema, due to diarrhea, vol depletion.


Assessment/Plan


Rec supplement K, free water hydration


Check stool guiacs.


continue iv abx, vent support for pneumonia as per ID, pulmonary. Recheck 

cultures. 


repeat labs in am





Subjective


ROS Limited/Unobtainable:  Yes


Subjective


Cardiology for Dr. Jorgensen





Intubated, sedated





Objective





Last 24 Hour Vital Signs








  Date Time  Temp Pulse Resp B/P (MAP) Pulse Ox O2 Delivery O2 Flow Rate FiO2


 


9/19/20 15:20  52 26     85


 


9/19/20 14:00  53 43 112/61 (78) 94   


 


9/19/20 13:00  49 48 98/53 (68) 96   


 


9/19/20 12:00 98.3 52 39 106/59 (75) 93   


 


9/19/20 12:00        85


 


9/19/20 12:00      Mechanical Ventilator  





      Mechanical Ventilator  


 


9/19/20 11:46  53 26     85


 


9/19/20 11:42  55      


 


9/19/20 11:00  56 41 117/60 (79) 95   


 


9/19/20 10:00  55 26 134/68 (90) 94   


 


9/19/20 09:00  44 34 118/60 (79) 94   


 


9/19/20 08:05  48      


 


9/19/20 08:00 98.3 49 35 143/65 (91) 94   


 


9/19/20 08:00        75


 


9/19/20 08:00      Mechanical Ventilator  





      Mechanical Ventilator  


 


9/19/20 07:43    149/60    


 


9/19/20 07:00  49 18 149/60 (89) 97   


 


9/19/20 06:47  51 26     85


 


9/19/20 06:00  48 26 108/59 (75) 99   


 


9/19/20 05:00  46 26 115/64 (81) 99   


 


9/19/20 04:00 98.3 46 26 116/60 (78) 100   


 


9/19/20 04:00      Mechanical Ventilator  





      Mechanical Ventilator  


 


9/19/20 04:00        85


 


9/19/20 03:00  59 23 123/67 (85) 100   


 


9/19/20 02:30  78 26     85


 


9/19/20 02:00  68 23 132/76 (94) 99   


 


9/19/20 01:00  67 23 138/64 (88) 100   


 


9/19/20 00:07  57      


 


9/19/20 00:00        85


 


9/19/20 00:00 97.5 58 12 130/62 (84) 97   


 


9/19/20 00:00      Mechanical Ventilator  





      Mechanical Ventilator  


 


9/18/20 23:10  65 26     85


 


9/18/20 23:00  64 26 153/63 (93) 99   


 


9/18/20 22:00  61 26 119/58 (78) 99   


 


9/18/20 21:00  80 25 139/55 (83) 100   


 


9/18/20 20:00 100.1 72 25 124/57 (79) 100   


 


9/18/20 20:00  72      


 


9/18/20 20:00        85


 


9/18/20 20:00      Mechanical Ventilator  





      Mechanical Ventilator  


 


9/18/20 19:35  89 26     85


 


9/18/20 19:00  70 25 129/63 (85) 99   


 


9/18/20 18:00  71 26 129/61 (83) 99   


 


9/18/20 17:00  61 26 126/54 (78) 100   








General Appearance:  WD/WN, on vent


EENT:  PERRL/EOMI, other - et tube


Neck:  no JVD


Rhythm:  NSR, SB


Cardiovascular:  regular rhythm, no gallop/murmur, bradycardia


Respiratory/Chest:  other - occ rhonchi. Transmitted upper airway sounds


Abdomen:  non tender, soft, other - + g tube


Extremities:  other - 1+ pedal edema bilat











Intake and Output  


 


 9/18/20 9/19/20





 19:00 07:00


 


Intake Total 1120 ml 800 ml


 


Output Total 1030 ml 1100 ml


 


Balance 90 ml -300 ml


 


  


 


IV Total 500 ml 


 


Tube Feeding 620 ml 600 ml


 


Other  200 ml


 


Output Urine Total 980 ml 1050 ml


 


Stool Total 50 ml 50 ml











Laboratory Tests








Test


 9/18/20


20:22 9/19/20


04:25


 


POC Whole Blood Glucose


 236 MG/DL


()  H 





 


White Blood Count


 


 14.1 K/UL


(4.8-10.8)  H


 


Red Blood Count


 


 2.18 M/UL


(4.20-5.40)  L


 


Hemoglobin


 


 7.1 G/DL


(12.0-16.0)  L


 


Hematocrit


 


 21.3 %


(37.0-47.0)  L


 


Mean Corpuscular Volume  98 FL (80-99)  


 


Mean Corpuscular Hemoglobin


 


 32.7 PG


(27.0-31.0)  H


 


Mean Corpuscular Hemoglobin


Concent 


 33.5 G/DL


(32.0-36.0)


 


Red Cell Distribution Width


 


 15.9 %


(11.6-14.8)  H


 


Platelet Count


 


 101 K/UL


(150-450)  L


 


Mean Platelet Volume


 


 8.5 FL


(6.5-10.1)


 


Neutrophils (%) (Auto)


 


 % (45.0-75.0)





 


Lymphocytes (%) (Auto)


 


 % (20.0-45.0)





 


Monocytes (%) (Auto)   % (1.0-10.0)  


 


Eosinophils (%) (Auto)   % (0.0-3.0)  


 


Basophils (%) (Auto)   % (0.0-2.0)  


 


Differential Total Cells


Counted 


 100  





 


Neutrophils % (Manual)  93 % (45-75)  H


 


Lymphocytes % (Manual)  4 % (20-45)  L


 


Monocytes % (Manual)  2 % (1-10)  


 


Eosinophils % (Manual)  0 % (0-3)  


 


Basophils % (Manual)  0 % (0-2)  


 


Band Neutrophils  1 % (0-8)  


 


Platelet Estimate  Decreased  L


 


Platelet Morphology  Normal  


 


Anisocytosis  1+  


 


Sodium Level


 


 154 MMOL/L


(136-145)  H


 


Potassium Level


 


 2.5 MMOL/L


(3.5-5.1)  *L


 


Chloride Level


 


 116 MMOL/L


()  H


 


Carbon Dioxide Level


 


 29 MMOL/L


(21-32)


 


Anion Gap


 


 8 mmol/L


(5-15)


 


Blood Urea Nitrogen


 


 24 mg/dL


(7-18)  H


 


Creatinine


 


 0.7 MG/DL


(0.55-1.30)


 


Estimat Glomerular Filtration


Rate 


 > 60 mL/min


(>60)


 


Glucose Level


 


 171 MG/DL


()  #H


 


Calcium Level


 


 7.2 MG/DL


(8.5-10.1)  L


 


Phosphorus Level


 


 2.4 MG/DL


(2.5-4.9)  L


 


Magnesium Level


 


 1.9 MG/DL


(1.8-2.4)


 


Total Bilirubin


 


 0.4 MG/DL


(0.2-1.0)


 


Aspartate Amino Transf


(AST/SGOT) 


 18 U/L (15-37)





 


Alanine Aminotransferase


(ALT/SGPT) 


 36 U/L (12-78)





 


Alkaline Phosphatase


 


 46 U/L


()


 


C-Reactive Protein,


Quantitative 


 1.5 mg/dL


(0.00-0.90)  H


 


Pro-B-Type Natriuretic Peptide


 


 18247 pg/mL


(0-125)  H


 


Total Protein


 


 4.6 G/DL


(6.4-8.2)  L


 


Albumin


 


 1.3 G/DL


(3.4-5.0)  L


 


Globulin  3.3 g/dL  


 


Albumin/Globulin Ratio


 


 0.4 (1.0-2.7)


Delmy Martins MD          Sep 19, 2020 17:00

## 2020-09-19 NOTE — INFECTIOUS DISEASES PROG NOTE
Assessment/Plan








ASSESSMENT:


sp code blue 8/26


Septic Shock; SP


Fever, recurrent- low grade over night 


Leukocytosis; persistent/fluctuating, improved 


   -9/9 u/a no pyuria


   -9/8 Bcx NTD (Picc line)


   -9/6 Bcx NTD


            ucx Neg


             sp cx C. parapsilopsis


  -8/26 u/a no pyuria





Pneumonia.- COVID 19 neg x3


   Acute hypoxic resp failure on VM> NRB 15l 100%; hypoxic on ABG> now VDRF 

8/26- Fio2 80% >100% 8/28> 60% 9/1 >80% 9/2   >95% 9/10 >90% 9/14 >60% 9/15


      -9/14 CXR: Improved aeration of both lungs.


       -9/5 CXR:Small bilateral pleural effusions with minor edema.  Stable 

edema with  mild worsening in the degree of pleural effusion on the right.


         -9/1 sp cx Neg


         8/31 CXR: Extensive bilateral interstitial and airspace disease appears

similar to the prior exam. Moderate to large bilateral pleural effusions appear 

unchanged.


   8/28 CXR: Increasing left upper lobe dense consolidation and likely 

increasing bilateral pleural fluid. Persistent diffuse dense consolidation 

elsewhere


            -8/26 sp cx normal resp lilliam


             -8/25 CXR: Increased atelectasis of the right lung, since prior 

exam of 3 days earlier. New or increased right pleural effusion. Increased left 

basilar consolidation and/or pleural fluid 


          -COVID Rapid PCR neg 8/23, 8/23, 8/26


          -8/22 spc x Group G strep


          -8/22 CXR:   Reduced lung volumes.  Patchy bilateral predominantly 

interstitial  pulmonary opacities.  Could be from edema and/or pneumonia.  There

is a broader differential.


 


        -legionella ag urine, blasto ab, Histo ab, HIV ab screen, JOY, ANCA neg





Persistent, high grade bacteremia-


     -8/22 Bcx 4/4 sets S. haemolyticus; 8/23 Bcx 3/4 S/ epi; 8/27 Bcx 1.4 S. 

warnerri; 8/29 Bcx Neg


     -2d echo: no vegetaions seen





          ua/ wbc 10-15, nit neg, leuk +1; ucx Neg





DAVID; 


 -supratherapeutic vanco levels





-Seizure disorder.


- Hypothyroidism.


- Down syndrome.





History of PEG tube placement.


[facility]





PLAN:





Continue to monitor off abx, if more episode of fever, will start Empiric AB Rx 


          9/14 SP Meropenem #18, IV AMikacin #7


          9/4/20 SP Daptomycin #11


          9/2 SP MIcafungin #7, Linezolid #5


   8/28 SP Azithromycin #7/7


   8/26 SP Ceftriaxone #2


   8/25 SP IV Vancomycin #4, Zosyn #4


   8/22 SP Cefepime x1, Flagyl x1





- Monitor CBC, BMP.


.f/u  Repeat cx


- COVID neg x3


- Monitor chest x-ray.


- Monitor the patient's clinical course and labs.  Based on those, we will do 

further recommendation.


-f/u Bcx from picc line, cdiff if diarrhea


-f/u Fungitell, asp ag, flow cytometry


-Once stable, CT chest/abd/pw/ given persistent leukocytosis


-poor prognosis


- pan Cx ( B,U, S) 








Thank you, Dr. Cherry, for allowing me to participate in the care of


this patient.  I will follow the patient with you at this


hospitalization.








Discussed with RN





Subjective


Allergies:  


Coded Allergies:  


     No Known Allergies (Unverified , 1/8/19)


in ICU 


Afebrile





Objective





Last 24 Hour Vital Signs








  Date Time  Temp Pulse Resp B/P (MAP) Pulse Ox O2 Delivery O2 Flow Rate FiO2


 


9/19/20 14:00  53 43 112/61 (78) 94   


 


9/19/20 13:00  49 48 98/53 (68) 96   


 


9/19/20 12:00 98.3 52 39 106/59 (75) 93   


 


9/19/20 12:00        85


 


9/19/20 12:00      Mechanical Ventilator  





      Mechanical Ventilator  


 


9/19/20 11:46  53 26     85


 


9/19/20 11:42  55      


 


9/19/20 11:00  56 41 117/60 (79) 95   


 


9/19/20 10:00  55 26 134/68 (90) 94   


 


9/19/20 09:00  44 34 118/60 (79) 94   


 


9/19/20 08:05  48      


 


9/19/20 08:00 98.3 49 35 143/65 (91) 94   


 


9/19/20 08:00        75


 


9/19/20 08:00      Mechanical Ventilator  





      Mechanical Ventilator  


 


9/19/20 07:43    149/60    


 


9/19/20 07:00  49 18 149/60 (89) 97   


 


9/19/20 06:47  51 26     85


 


9/19/20 06:00  48 26 108/59 (75) 99   


 


9/19/20 05:00  46 26 115/64 (81) 99   


 


9/19/20 04:00 98.3 46 26 116/60 (78) 100   


 


9/19/20 04:00      Mechanical Ventilator  





      Mechanical Ventilator  


 


9/19/20 04:00        85


 


9/19/20 03:00  59 23 123/67 (85) 100   


 


9/19/20 02:30  78 26     85


 


9/19/20 02:00  68 23 132/76 (94) 99   


 


9/19/20 01:00  67 23 138/64 (88) 100   


 


9/19/20 00:07  57      


 


9/19/20 00:00        85


 


9/19/20 00:00 97.5 58 12 130/62 (84) 97   


 


9/19/20 00:00      Mechanical Ventilator  





      Mechanical Ventilator  


 


9/18/20 23:10  65 26     85


 


9/18/20 23:00  64 26 153/63 (93) 99   


 


9/18/20 22:00  61 26 119/58 (78) 99   


 


9/18/20 21:00  80 25 139/55 (83) 100   


 


9/18/20 20:00 100.1 72 25 124/57 (79) 100   


 


9/18/20 20:00  72      


 


9/18/20 20:00        85


 


9/18/20 20:00      Mechanical Ventilator  





      Mechanical Ventilator  


 


9/18/20 19:35  89 26     85


 


9/18/20 19:00  70 25 129/63 (85) 99   


 


9/18/20 18:00  71 26 129/61 (83) 99   


 


9/18/20 17:00  61 26 126/54 (78) 100   


 


9/18/20 16:00        85


 


9/18/20 16:00 100.0 69 26 133/55 (81) 100   


 


9/18/20 16:00  66      


 


9/18/20 16:00      Mechanical Ventilator  





      Mechanical Ventilator  


 


9/18/20 15:41  63 26     85








Height (Feet):  5


Height (Inches):  3.00


Weight (Pounds):  172


HEENT:  atraumatic


Respiratory/Chest:  normal breath sounds


Cardiovascular:  regular rhythm


Abdomen:  no organomegaly





Laboratory Tests








Test


 9/18/20


20:22 9/19/20


04:25


 


POC Whole Blood Glucose


 236 MG/DL


()  H 





 


White Blood Count


 


 14.1 K/UL


(4.8-10.8)  H


 


Red Blood Count


 


 2.18 M/UL


(4.20-5.40)  L


 


Hemoglobin


 


 7.1 G/DL


(12.0-16.0)  L


 


Hematocrit


 


 21.3 %


(37.0-47.0)  L


 


Mean Corpuscular Volume  98 FL (80-99)  


 


Mean Corpuscular Hemoglobin


 


 32.7 PG


(27.0-31.0)  H


 


Mean Corpuscular Hemoglobin


Concent 


 33.5 G/DL


(32.0-36.0)


 


Red Cell Distribution Width


 


 15.9 %


(11.6-14.8)  H


 


Platelet Count


 


 101 K/UL


(150-450)  L


 


Mean Platelet Volume


 


 8.5 FL


(6.5-10.1)


 


Neutrophils (%) (Auto)


 


 % (45.0-75.0)





 


Lymphocytes (%) (Auto)


 


 % (20.0-45.0)





 


Monocytes (%) (Auto)   % (1.0-10.0)  


 


Eosinophils (%) (Auto)   % (0.0-3.0)  


 


Basophils (%) (Auto)   % (0.0-2.0)  


 


Differential Total Cells


Counted 


 100  





 


Neutrophils % (Manual)  93 % (45-75)  H


 


Lymphocytes % (Manual)  4 % (20-45)  L


 


Monocytes % (Manual)  2 % (1-10)  


 


Eosinophils % (Manual)  0 % (0-3)  


 


Basophils % (Manual)  0 % (0-2)  


 


Band Neutrophils  1 % (0-8)  


 


Platelet Estimate  Decreased  L


 


Platelet Morphology  Normal  


 


Anisocytosis  1+  


 


Sodium Level


 


 154 MMOL/L


(136-145)  H


 


Potassium Level


 


 2.5 MMOL/L


(3.5-5.1)  *L


 


Chloride Level


 


 116 MMOL/L


()  H


 


Carbon Dioxide Level


 


 29 MMOL/L


(21-32)


 


Anion Gap


 


 8 mmol/L


(5-15)


 


Blood Urea Nitrogen


 


 24 mg/dL


(7-18)  H


 


Creatinine


 


 0.7 MG/DL


(0.55-1.30)


 


Estimat Glomerular Filtration


Rate 


 > 60 mL/min


(>60)


 


Glucose Level


 


 171 MG/DL


()  #H


 


Calcium Level


 


 7.2 MG/DL


(8.5-10.1)  L


 


Phosphorus Level


 


 2.4 MG/DL


(2.5-4.9)  L


 


Magnesium Level


 


 1.9 MG/DL


(1.8-2.4)


 


Total Bilirubin


 


 0.4 MG/DL


(0.2-1.0)


 


Aspartate Amino Transf


(AST/SGOT) 


 18 U/L (15-37)





 


Alanine Aminotransferase


(ALT/SGPT) 


 36 U/L (12-78)





 


Alkaline Phosphatase


 


 46 U/L


()


 


C-Reactive Protein,


Quantitative 


 1.5 mg/dL


(0.00-0.90)  H


 


Pro-B-Type Natriuretic Peptide


 


 36648 pg/mL


(0-125)  H


 


Total Protein


 


 4.6 G/DL


(6.4-8.2)  L


 


Albumin


 


 1.3 G/DL


(3.4-5.0)  L


 


Globulin  3.3 g/dL  


 


Albumin/Globulin Ratio


 


 0.4 (1.0-2.7)


L











Current Medications








 Medications


  (Trade)  Dose


 Ordered  Sig/Didi


 Route


 PRN Reason  Start Time


 Stop Time Status Last Admin


Dose Admin


 


 Acetaminophen


  (Tylenol)  650 mg  Q4H  PRN


 GT


 FEVER  8/26/20 11:45


 9/25/20 11:44  9/18/20 12:48





 


 Chlorhexidine


 Gluconate


  (Beatrice-Hex 2%)  1 applic  DAILY@2000


 TOPIC


   8/31/20 20:00


 11/29/20 19:59  9/18/20 19:51





 


 Clotrimazole


  (Lotrimin)  1 applic  Q12HR


 TOPIC


   8/30/20 13:00


 11/28/20 12:59  9/19/20 08:07





 


 Dextrose


  (Dextrose 50%)  25 ml  Q30M  PRN


 IV


 Hypoglycemia  8/26/20 11:30


 11/20/20 11:29   





 


 Dextrose


  (Dextrose 50%)  50 ml  Q30M  PRN


 IV


 Hypoglycemia  8/26/20 11:30


 11/20/20 11:29   





 


 Dopamine HCl/


 Dextrose  250 ml @ 0


 mls/hr  Q24H


 IV


   9/4/20 11:15


 12/3/20 11:14  9/6/20 19:47





 


 Hydrocortisone


  (Solu-CORTEF)  100 mg  EVERY 8  HOURS


 IV


   8/30/20 14:00


 11/28/20 13:59  9/19/20 14:02





 


 Insulin Aspart


  (NovoLOG)    Q6HR


 SUBQ


   9/18/20 12:00


 12/17/20 11:59  9/19/20 12:20





 


 Insulin Detemir


  (Levemir)  10 units  Q12HR


 SUBQ


   9/18/20 10:00


 12/17/20 09:59  9/19/20 08:07





 


 Levothyroxine


 Sodium


  (Synthroid)  75 mcg  DAILY


 GT


   8/27/20 09:00


 9/22/20 08:59  9/19/20 08:05





 


 Loperamide HCl


  (Imodium)  2 mg  Q6H  PRN


 NG


 Diarrhea  9/16/20 10:30


 10/16/20 10:29   





 


 Midodrine


  (Pro-Amatine)  10 mg  Q8HR


 GT


   8/28/20 14:00


 11/26/20 13:59  9/19/20 14:02





 


 Norepinephrine


 Bitartrate 16 mg/


 Dextrose  500 ml @ 0


 mls/hr  Q24H


 IV


   8/31/20 07:45


 9/29/20 12:59  9/4/20 08:43





 


 Ondansetron HCl


  (Zofran)  4 mg  Q6H  PRN


 IVP


 Nausea & Vomiting  8/26/20 12:00


 9/21/20 11:59   





 


 Pantoprazole


  (Protonix)  40 mg  DAILY


 IV


   8/27/20 09:00


 9/22/20 08:59  9/19/20 08:05





 


 Phenylephrine HCl


 50 mg/Dextrose  250 ml @ 0


 mls/hr  Q24H  PRN


 IV


 For hypotension  8/29/20 17:45


 9/28/20 17:44  8/31/20 01:49





 


 Polyethylene


 Glycol


  (Miralax)  17 gm  DAILYPRN  PRN


 GT


 Constipation  8/26/20 12:00


 9/21/20 11:59   





 


 Valproic Acid


  (Depakene)  500 mg  EVERY 8  HOURS


 GT


   8/26/20 14:00


 9/21/20 21:59  9/19/20 14:02




















Nicholas Camara MD               Sep 19, 2020 15:12

## 2020-09-19 NOTE — NEPHROLOGY PROGRESS NOTE
Assessment/Plan


Problem List:  


(1) DAVID (acute kidney injury)


(2) Respiratory failure requiring intubation


(3) Down's syndrome


(4) Seizure disorder


(5) Hypothyroidism


Assessment





Acute renal failure, likely due to hypotension


Acute respiratory distress, hypoxia


Seizure disorder


Hypothyroidism


Down syndrome


Full code











Fluid challenge with IV fluids and albumin


Midodrine for BP above 100 systolic


Check TSH level


Check


Correct level


Monitor renal parameters


Urine studies


Per orders


Plan


September 19: Labs reviewed.  Abnormal electrolyte addressed.  Remains full 

code.  Remains vented.


September 18: Day 27 of hospitalization.  Full code.  Labs reviewed.  Hemoglobin

down to 7.5.  Electrolyte abnormalities addressed and corrections ordered.  

Continue to monitor renal parameters.  Continue per consultants.  Start on 

Levemir for blood sugar management.  Questioning continuation of hydrocortisone?


September 17: Labs reviewed.  Potassium, phosphorus, hemoglobin, are all low.  

Potassium and phosphorus IV replacement given.  Continue to monitor electrolytes

and CBC.  Patient remains full code.


September 16: Lab reviewed.  Low phosphorus low magnesium and low potassium was 

addressed.  Hemoglobin drifting lower.  Continue per consultants.  Patient 

remains full code.


September 15: Labs reviewed.  Patient continues to be on ventilator.  D5W for 

high sodium and also potassium chloride intravenously as supplement given.  

Hemoglobin 8.4.  Continue to monitor electrolytes and renal parameters.


September 14: Labs reviewed.  Low potassium and high sodium noted.  Hemoglobin 

8.1 stable.  Aim to correct abnormal electrolyte.  Continue rest.  Will give 2 

boluses of D5W 500 cc.


September 13: Lab reviewed.  Abnormal electrolytes noted and addressed.


September 12: Labs reviewed.  Potassium supplement given.  Patient remains full 

code.  Continue per consultants.


September 11: Lab reviewed.  Electrolyte abnormalities addressed.  Continue per 

pulmonary and ID.


September 10: Lab reviewed.  Status unchanged.  Serum sodium 151 unchanged.  

Stable from renal standpoint of view.


September 9: Labs reviewed.  Status quo.  D5W 500 cc IV ordered.  Continue to 

monitor renal parameters.


September 8: Status quo.  Labs reviewed.  Overall condition unchanged.  Patient 

was transfused and hemoglobin higher.  Continue current management.  Patient 

remains full code.


September 7: Status quo.  Overall condition poor.  Very low albumin.  Edematous.

 Hypotensive.  Hemoglobin lower.  Anemia work-up ordered.  I favor transfusion 2

units of packed RBCs.  Patient remains full code.  I favor supportive care only.

 Will discuss.


September 6: Electrolyte abnormalities addressed.  Serum creatinine lower.  

Continue per current management.


September 5: Status unchanged.  Lab reviewed.  Serum potassium 2.7.  IV 

potassium chloride ordered.  Serum creatinine low at 1.6 stable.  Blood pressure

90s systolic


September 4: Status quo.  Labs reviewed.  Renal parameters stable.  Serum 

creatinine down to 1.6.  Medication list reviewed.  Continues to be on 

midodrine.  Continue per consultants.


September 3: Status quo.  Labs reviewed.  Electrolytes adjusted.  Serum 

creatinine down to 1.8.  Continue per consultants.


September 2: Status quo.  Labs reviewed.  Phosphorus supplement IV given.  Serum

creatinine 2.  Continue per consultants.


September 1: Requires less pressors.  Albumin bolus given.  1 dose of Lasix IV 

ordered as the patient severely edematous.  Patient serum albumin is very low.  

Continue per consultants.


August 31: Continues to be intubated.  Labs reviewed.  Serum creatinine 1.9 

unchanged.  Blood pressure more stable.  Off 1 of the pressors.  Continue to 

monitor renal parameters.  Continue per consultants.  Patient now on 

hydrocortisone 100 mg every 8 hours.  Will decrease IV fluid.  Normal saline 

down to 50 cc an hour.


August 30: Intubated.  Labs reviewed.  Creatinine 1.9 unchanged.  Continue same 

treatment plan.  Per consultants.  Overall poor prognosis since the patient 

remains on pressors and her pulmonary status is worsening.


August 29: Remains intubated.  Labs reviewed.  Creatinine 1.9.  Blood pressure 

systolic 90s.  Continue per consultants.


August 28: Remains intubated.  Labs reviewed.  Serum creatinine lower to 2.  

Vancomycin level lower.  Remains hypotensive on pressors.  Will increase 

midodrine to 10 mg every 8 hours.  Continue per consultants.  Continue to 

monitor renal parameters.


August 27: Patient now in ICU.  Intubated.  On pressors.  Labs reviewed.  Will 

increase midodrine.  Aim to keep blood pressure over 100 systolic.  Will give 

albumin bolus.  Will check vancomycin level which was elevated when checked 

previously on August 24.  Will monitor renal parameters.  Continue per 

consultants.





Subjective


ROS Limited/Unobtainable:  Yes





Objective


Objective





Last 24 Hour Vital Signs








  Date Time  Temp Pulse Resp B/P (MAP) Pulse Ox O2 Delivery O2 Flow Rate FiO2


 


9/19/20 14:00  53 43 112/61 (78) 94   


 


9/19/20 13:00  49 48 98/53 (68) 96   


 


9/19/20 12:00 98.3 52 39 106/59 (75) 93   


 


9/19/20 12:00        85


 


9/19/20 12:00      Mechanical Ventilator  





      Mechanical Ventilator  


 


9/19/20 11:46  53 26     85


 


9/19/20 11:42  55      


 


9/19/20 11:00  56 41 117/60 (79) 95   


 


9/19/20 10:00  55 26 134/68 (90) 94   


 


9/19/20 09:00  44 34 118/60 (79) 94   


 


9/19/20 08:05  48      


 


9/19/20 08:00 98.3 49 35 143/65 (91) 94   


 


9/19/20 08:00        75


 


9/19/20 08:00      Mechanical Ventilator  





      Mechanical Ventilator  


 


9/19/20 07:43    149/60    


 


9/19/20 07:00  49 18 149/60 (89) 97   


 


9/19/20 06:47  51 26     85


 


9/19/20 06:00  48 26 108/59 (75) 99   


 


9/19/20 05:00  46 26 115/64 (81) 99   


 


9/19/20 04:00 98.3 46 26 116/60 (78) 100   


 


9/19/20 04:00      Mechanical Ventilator  





      Mechanical Ventilator  


 


9/19/20 04:00        85


 


9/19/20 03:00  59 23 123/67 (85) 100   


 


9/19/20 02:30  78 26     85


 


9/19/20 02:00  68 23 132/76 (94) 99   


 


9/19/20 01:00  67 23 138/64 (88) 100   


 


9/19/20 00:07  57      


 


9/19/20 00:00        85


 


9/19/20 00:00 97.5 58 12 130/62 (84) 97   


 


9/19/20 00:00      Mechanical Ventilator  





      Mechanical Ventilator  


 


9/18/20 23:10  65 26     85


 


9/18/20 23:00  64 26 153/63 (93) 99   


 


9/18/20 22:00  61 26 119/58 (78) 99   


 


9/18/20 21:00  80 25 139/55 (83) 100   


 


9/18/20 20:00 100.1 72 25 124/57 (79) 100   


 


9/18/20 20:00  72      


 


9/18/20 20:00        85


 


9/18/20 20:00      Mechanical Ventilator  





      Mechanical Ventilator  


 


9/18/20 19:35  89 26     85


 


9/18/20 19:00  70 25 129/63 (85) 99   


 


9/18/20 18:00  71 26 129/61 (83) 99   


 


9/18/20 17:00  61 26 126/54 (78) 100   


 


9/18/20 16:00        85


 


9/18/20 16:00 100.0 69 26 133/55 (81) 100   


 


9/18/20 16:00  66      


 


9/18/20 16:00      Mechanical Ventilator  





      Mechanical Ventilator  


 


9/18/20 15:41  63 26     85

















Intake and Output  


 


 9/18/20 9/19/20





 19:00 07:00


 


Intake Total 1120 ml 800 ml


 


Output Total 1030 ml 1100 ml


 


Balance 90 ml -300 ml


 


  


 


IV Total 500 ml 


 


Tube Feeding 620 ml 600 ml


 


Other  200 ml


 


Output Urine Total 980 ml 1050 ml


 


Stool Total 50 ml 50 ml








Laboratory Tests


9/18/20 20:22: POC Whole Blood Glucose 236H


9/19/20 04:25: 


White Blood Count 14.1H, Red Blood Count 2.18L, Hemoglobin 7.1L, Hematocrit 

21.3L, Mean Corpuscular Volume 98, Mean Corpuscular Hemoglobin 32.7H, Mean 

Corpuscular Hemoglobin Concent 33.5, Red Cell Distribution Width 15.9H, Platelet

Count 101L, Mean Platelet Volume 8.5, Neutrophils (%) (Auto) , Lymphocytes (%) 

(Auto) , Monocytes (%) (Auto) , Eosinophils (%) (Auto) , Basophils (%) (Auto) , 

Differential Total Cells Counted 100, Neutrophils % (Manual) 93H, Lymphocytes % 

(Manual) 4L, Monocytes % (Manual) 2, Eosinophils % (Manual) 0, Basophils % 

(Manual) 0, Band Neutrophils 1, Platelet Estimate DecreasedL, Platelet 

Morphology Normal, Anisocytosis 1+, Sodium Level 154H, Potassium Level 2.5*L, 

Chloride Level 116H, Carbon Dioxide Level 29, Anion Gap 8, Blood Urea Nitrogen 

24H, Creatinine 0.7, Estimat Glomerular Filtration Rate > 60, Glucose Level 

171#H, Calcium Level 7.2L, Phosphorus Level 2.4L, Magnesium Level 1.9, Total 

Bilirubin 0.4, Aspartate Amino Transf (AST/SGOT) 18, Alanine Aminotransferase 

(ALT/SGPT) 36, Alkaline Phosphatase 46, C-Reactive Protein, Quantitative 1.5H, 

Pro-B-Type Natriuretic Peptide 01650S, Total Protein 4.6L, Albumin 1.3L, 

Globulin 3.3, Albumin/Globulin Ratio 0.4L


Height (Feet):  5


Height (Inches):  3.00


Weight (Pounds):  172


General Appearance:  no apparent distress


EENT:  other - On ventilator


Cardiovascular:  normal rate


Respiratory/Chest:  decreased breath sounds


Abdomen:  distended











Lam Nino MD            Sep 19, 2020 15:41

## 2020-09-19 NOTE — NUR
NURSE NOTES:

Post 15 minutes of blood transfusion, patient is tolerating well. Temperature 98.1, B/P 
119/66, Pulse 65.

## 2020-09-19 NOTE — NUR
NURSE NOTES:

Patient became tachypneic, RR 33 and O2 saturation at 89%. Increased patient's FiO2 to 80%, 
Patient's RR 24, O2 Saturation at 97%.

## 2020-09-19 NOTE — PULMONOLGY CRITICAL CARE NOTE
Critical Care - Asmt/Plan


Problems:  


(1) Acute respiratory failure


(2) Bacteremia


(3) Pneumonia


(4) Sepsis


(5) HCAP (healthcare-associated pneumonia)


(6) Seizure disorder


(7) Down's syndrome


Respiratory:  monitor respiratory rate, adjust FIO2, CXR


Cardiac:  continue to monitor HR/BP


Renal:  F/U I&O, keep IV fluid, check electrolytes


Infectious Disease:  check cultures, continue antibiotics


Gastrointestinal:  continue feedings/current rate


Endocrine:  monitor blood sugar


Hematologic:  monitor H/H, transfuse if hgb<8.5


Neurologic:  PRN Ativan, keep patient comfortable


Affect:  PRN ativan


Prophylaxis:  Protonix


Disposition:  keep in ICU


Notes Reviewed:  internist, cardio, renal, ID





Critical Care - Objective





Last 24 Hour Vital Signs








  Date Time  Temp Pulse Resp B/P (MAP) Pulse Ox O2 Delivery O2 Flow Rate FiO2


 


9/19/20 07:43    149/60    


 


9/19/20 07:00  49 18 149/60 (89) 97   


 


9/19/20 06:47  51 26     85


 


9/19/20 06:00  48 26 108/59 (75) 99   


 


9/19/20 05:00  46 26 115/64 (81) 99   


 


9/19/20 04:00 98.3 46 26 116/60 (78) 100   


 


9/19/20 04:00      Mechanical Ventilator  





      Mechanical Ventilator  


 


9/19/20 04:00        85


 


9/19/20 03:00  59 23 123/67 (85) 100   


 


9/19/20 02:30  78 26     85


 


9/19/20 02:00  68 23 132/76 (94) 99   


 


9/19/20 01:00  67 23 138/64 (88) 100   


 


9/19/20 00:07  57      


 


9/19/20 00:00        85


 


9/19/20 00:00 97.5 58 12 130/62 (84) 97   


 


9/19/20 00:00      Mechanical Ventilator  





      Mechanical Ventilator  


 


9/18/20 23:10  65 26     85


 


9/18/20 23:00  64 26 153/63 (93) 99   


 


9/18/20 22:00  61 26 119/58 (78) 99   


 


9/18/20 21:00  80 25 139/55 (83) 100   


 


9/18/20 20:00 100.1 72 25 124/57 (79) 100   


 


9/18/20 20:00  72      


 


9/18/20 20:00        85


 


9/18/20 20:00      Mechanical Ventilator  





      Mechanical Ventilator  


 


9/18/20 19:35  89 26     85


 


9/18/20 19:00  70 25 129/63 (85) 99   


 


9/18/20 18:00  71 26 129/61 (83) 99   


 


9/18/20 17:00  61 26 126/54 (78) 100   


 


9/18/20 16:00        85


 


9/18/20 16:00 100.0 69 26 133/55 (81) 100   


 


9/18/20 16:00  66      


 


9/18/20 16:00      Mechanical Ventilator  





      Mechanical Ventilator  


 


9/18/20 15:41  63 26     85


 


9/18/20 15:00  64 26 113/51 (71) 100   


 


9/18/20 14:00  93 30 109/88 (95) 99   


 


9/18/20 13:18 100.3       


 


9/18/20 13:00  96 24 130/60 (83) 98   


 


9/18/20 12:00  72      


 


9/18/20 12:00        85


 


9/18/20 12:00 100.3 64 26 115/49 (71) 98   


 


9/18/20 12:00      Mechanical Ventilator  





      Mechanical Ventilator  


 


9/18/20 11:08  78 26     85


 


9/18/20 11:00  78 21 125/92 (103) 100   


 


9/18/20 10:00  72 24 132/86 (101) 100   


 


9/18/20 09:00  60 26 131/85 (100) 99   








Status:  awake


Condition:  critical


HEENT:  atraumatic, normocephalic


Neck:  full ROM


Lungs:  clear, chest wall tender


Heart:  HR/BP stable


Abdomen:  soft, non-tender


Extremities:  no C/C/E


Accucheck:  163





Critical Care - Subjective


ROS Limited/Unobtainable:  Yes


Condition:  critical


EKG Rhythm:  Sinus Rhythm


FI02:  85


Vent Support Breath Rate:  26


Vent Support Mode:  AC


Vent Tidal Volume:  500


Sputum Amount:  Small


PEEP:  10.0


PIP:  37


Tube Feeding Amount:  50


I&O:











Intake and Output  


 


 9/18/20 9/19/20





 19:00 07:00


 


Intake Total 1120 ml 800 ml


 


Output Total 1030 ml 1100 ml


 


Balance 90 ml -300 ml


 


  


 


IV Total 500 ml 


 


Tube Feeding 620 ml 600 ml


 


Other  200 ml


 


Output Urine Total 980 ml 1050 ml


 


Stool Total 50 ml 50 ml








ET-Tube:  7.5


ET Position:  22


Labs:





Laboratory Tests








Test


 9/18/20


20:22 9/19/20


04:25


 


POC Whole Blood Glucose


 236 MG/DL


()  H 





 


White Blood Count


 


 14.1 K/UL


(4.8-10.8)  H


 


Red Blood Count


 


 2.18 M/UL


(4.20-5.40)  L


 


Hemoglobin


 


 7.1 G/DL


(12.0-16.0)  L


 


Hematocrit


 


 21.3 %


(37.0-47.0)  L


 


Mean Corpuscular Volume  98 FL (80-99)  


 


Mean Corpuscular Hemoglobin


 


 32.7 PG


(27.0-31.0)  H


 


Mean Corpuscular Hemoglobin


Concent 


 33.5 G/DL


(32.0-36.0)


 


Red Cell Distribution Width


 


 15.9 %


(11.6-14.8)  H


 


Platelet Count


 


 101 K/UL


(150-450)  L


 


Mean Platelet Volume


 


 8.5 FL


(6.5-10.1)


 


Neutrophils (%) (Auto)


 


 % (45.0-75.0)





 


Lymphocytes (%) (Auto)


 


 % (20.0-45.0)





 


Monocytes (%) (Auto)   % (1.0-10.0)  


 


Eosinophils (%) (Auto)   % (0.0-3.0)  


 


Basophils (%) (Auto)   % (0.0-2.0)  


 


Neutrophils % (Manual)  Pending  


 


Lymphocytes % (Manual)  Pending  


 


Platelet Estimate  Pending  


 


Platelet Morphology  Pending  


 


Sodium Level


 


 154 MMOL/L


(136-145)  H


 


Potassium Level


 


 2.5 MMOL/L


(3.5-5.1)  *L


 


Chloride Level


 


 116 MMOL/L


()  H


 


Carbon Dioxide Level


 


 29 MMOL/L


(21-32)


 


Anion Gap


 


 8 mmol/L


(5-15)


 


Blood Urea Nitrogen


 


 24 mg/dL


(7-18)  H


 


Creatinine


 


 0.7 MG/DL


(0.55-1.30)


 


Estimat Glomerular Filtration


Rate 


 > 60 mL/min


(>60)


 


Glucose Level


 


 171 MG/DL


()  #H


 


Calcium Level


 


 7.2 MG/DL


(8.5-10.1)  L


 


Phosphorus Level


 


 2.4 MG/DL


(2.5-4.9)  L


 


Magnesium Level


 


 1.9 MG/DL


(1.8-2.4)


 


Total Bilirubin


 


 0.4 MG/DL


(0.2-1.0)


 


Aspartate Amino Transf


(AST/SGOT) 


 18 U/L (15-37)





 


Alanine Aminotransferase


(ALT/SGPT) 


 36 U/L (12-78)





 


Alkaline Phosphatase


 


 46 U/L


()


 


C-Reactive Protein,


Quantitative 


 1.5 mg/dL


(0.00-0.90)  H


 


Pro-B-Type Natriuretic Peptide


 


 94221 pg/mL


(0-125)  H


 


Total Protein


 


 4.6 G/DL


(6.4-8.2)  L


 


Albumin


 


 1.3 G/DL


(3.4-5.0)  L


 


Globulin  3.3 g/dL  


 


Albumin/Globulin Ratio


 


 0.4 (1.0-2.7)


L

















Chano Cherry MD              Sep 19, 2020 08:13

## 2020-09-19 NOTE — NUR
NURSE NOTES:

Received patient from Nirav RN. Patient is awake, alert and oriented x2. Sinus Bradycardia 
on the heart monitor, HR 48. Receiving oxygen via ET Tube 7.5 22cm at the lip line, vent 
settings: AC 26, , FiO2 85%, PEEP 10. IV site is Left Upper Arm PICC patent and 
intact. G-tube is intact and receiving Vital AF at 50cc/hr. Valle catheter is intact and 
draining, rectal tube is also intact and draining. Bed is locked, placed in lowest position, 
side rails up x3, bed alarm on, head of bed elevated, call light within reach. Will continue 
to monitor.

## 2020-09-19 NOTE — INTERNAL MED PROGRESS NOTE
Subjective


Date of Service:  Sep 19, 2020


Physician Name


Sanya Schultz


Attending Physician


Chano Cherry MD





Current Medications








 Medications


  (Trade)  Dose


 Ordered  Sig/Didi


 Route


 PRN Reason  Start Time


 Stop Time Status Last Admin


Dose Admin


 


 Acetaminophen


  (Tylenol)  650 mg  Q4H  PRN


 GT


 FEVER  8/26/20 11:45


 9/25/20 11:44  9/18/20 12:48





 


 Chlorhexidine


 Gluconate


  (Beatrice-Hex 2%)  1 applic  DAILY@2000


 TOPIC


   8/31/20 20:00


 11/29/20 19:59  9/18/20 19:51





 


 Clotrimazole


  (Lotrimin)  1 applic  Q12HR


 TOPIC


   8/30/20 13:00


 11/28/20 12:59  9/19/20 08:07





 


 Dextrose


  (Dextrose 50%)  25 ml  Q30M  PRN


 IV


 Hypoglycemia  8/26/20 11:30


 11/20/20 11:29   





 


 Dextrose


  (Dextrose 50%)  50 ml  Q30M  PRN


 IV


 Hypoglycemia  8/26/20 11:30


 11/20/20 11:29   





 


 Dopamine HCl/


 Dextrose  250 ml @ 0


 mls/hr  Q24H


 IV


   9/4/20 11:15


 12/3/20 11:14  9/6/20 19:47





 


 Hydrocortisone


  (Solu-CORTEF)  100 mg  EVERY 8  HOURS


 IV


   8/30/20 14:00


 11/28/20 13:59  9/19/20 14:02





 


 Insulin Aspart


  (NovoLOG)    Q6HR


 SUBQ


   9/18/20 12:00


 12/17/20 11:59  9/19/20 12:20





 


 Insulin Detemir


  (Levemir)  10 units  Q12HR


 SUBQ


   9/18/20 10:00


 12/17/20 09:59  9/19/20 08:07





 


 Levothyroxine


 Sodium


  (Synthroid)  75 mcg  DAILY


 GT


   8/27/20 09:00


 9/22/20 08:59  9/19/20 08:05





 


 Loperamide HCl


  (Imodium)  2 mg  Q6H  PRN


 NG


 Diarrhea  9/16/20 10:30


 10/16/20 10:29   





 


 Midodrine


  (Pro-Amatine)  10 mg  Q8HR


 GT


   8/28/20 14:00


 11/26/20 13:59  9/19/20 14:02





 


 Norepinephrine


 Bitartrate 16 mg/


 Dextrose  500 ml @ 0


 mls/hr  Q24H


 IV


   8/31/20 07:45


 9/29/20 12:59  9/4/20 08:43





 


 Ondansetron HCl


  (Zofran)  4 mg  Q6H  PRN


 IVP


 Nausea & Vomiting  8/26/20 12:00


 9/21/20 11:59   





 


 Pantoprazole


  (Protonix)  40 mg  DAILY


 IV


   8/27/20 09:00


 9/22/20 08:59  9/19/20 08:05





 


 Phenylephrine HCl


 50 mg/Dextrose  250 ml @ 0


 mls/hr  Q24H  PRN


 IV


 For hypotension  8/29/20 17:45


 9/28/20 17:44  8/31/20 01:49





 


 Polyethylene


 Glycol


  (Miralax)  17 gm  DAILYPRN  PRN


 GT


 Constipation  8/26/20 12:00


 9/21/20 11:59   





 


 Valproic Acid


  (Depakene)  500 mg  EVERY 8  HOURS


 GT


   8/26/20 14:00


 9/21/20 21:59  9/19/20 14:02











Allergies:  


Coded Allergies:  


     No Known Allergies (Unverified , 1/8/19)


ROS Limited/Unobtainable:  Yes


Subjective


58 YO F with Down's syndrome admitted with hypoxia.  Now sepsis and pneumonia.  

Cover for Int Med-DR Hung.  ICU.  Intubated and sedated





Objective





Last Vital Signs








  Date Time  Temp Pulse Resp B/P (MAP) Pulse Ox O2 Delivery O2 Flow Rate FiO2


 


9/19/20 15:20  52 26     85


 


9/19/20 14:00    112/61 (78) 94   


 


9/19/20 12:00 98.3       


 


9/19/20 12:00      Mechanical Ventilator  





      Mechanical Ventilator  











Laboratory Tests








Test


 9/18/20


20:22 9/19/20


04:25


 


POC Whole Blood Glucose


 236 MG/DL


()  H 





 


White Blood Count


 


 14.1 K/UL


(4.8-10.8)  H


 


Red Blood Count


 


 2.18 M/UL


(4.20-5.40)  L


 


Hemoglobin


 


 7.1 G/DL


(12.0-16.0)  L


 


Hematocrit


 


 21.3 %


(37.0-47.0)  L


 


Mean Corpuscular Volume  98 FL (80-99)  


 


Mean Corpuscular Hemoglobin


 


 32.7 PG


(27.0-31.0)  H


 


Mean Corpuscular Hemoglobin


Concent 


 33.5 G/DL


(32.0-36.0)


 


Red Cell Distribution Width


 


 15.9 %


(11.6-14.8)  H


 


Platelet Count


 


 101 K/UL


(150-450)  L


 


Mean Platelet Volume


 


 8.5 FL


(6.5-10.1)


 


Neutrophils (%) (Auto)


 


 % (45.0-75.0)





 


Lymphocytes (%) (Auto)


 


 % (20.0-45.0)





 


Monocytes (%) (Auto)   % (1.0-10.0)  


 


Eosinophils (%) (Auto)   % (0.0-3.0)  


 


Basophils (%) (Auto)   % (0.0-2.0)  


 


Differential Total Cells


Counted 


 100  





 


Neutrophils % (Manual)  93 % (45-75)  H


 


Lymphocytes % (Manual)  4 % (20-45)  L


 


Monocytes % (Manual)  2 % (1-10)  


 


Eosinophils % (Manual)  0 % (0-3)  


 


Basophils % (Manual)  0 % (0-2)  


 


Band Neutrophils  1 % (0-8)  


 


Platelet Estimate  Decreased  L


 


Platelet Morphology  Normal  


 


Anisocytosis  1+  


 


Sodium Level


 


 154 MMOL/L


(136-145)  H


 


Potassium Level


 


 2.5 MMOL/L


(3.5-5.1)  *L


 


Chloride Level


 


 116 MMOL/L


()  H


 


Carbon Dioxide Level


 


 29 MMOL/L


(21-32)


 


Anion Gap


 


 8 mmol/L


(5-15)


 


Blood Urea Nitrogen


 


 24 mg/dL


(7-18)  H


 


Creatinine


 


 0.7 MG/DL


(0.55-1.30)


 


Estimat Glomerular Filtration


Rate 


 > 60 mL/min


(>60)


 


Glucose Level


 


 171 MG/DL


()  #H


 


Calcium Level


 


 7.2 MG/DL


(8.5-10.1)  L


 


Phosphorus Level


 


 2.4 MG/DL


(2.5-4.9)  L


 


Magnesium Level


 


 1.9 MG/DL


(1.8-2.4)


 


Total Bilirubin


 


 0.4 MG/DL


(0.2-1.0)


 


Aspartate Amino Transf


(AST/SGOT) 


 18 U/L (15-37)





 


Alanine Aminotransferase


(ALT/SGPT) 


 36 U/L (12-78)





 


Alkaline Phosphatase


 


 46 U/L


()


 


C-Reactive Protein,


Quantitative 


 1.5 mg/dL


(0.00-0.90)  H


 


Pro-B-Type Natriuretic Peptide


 


 63131 pg/mL


(0-125)  H


 


Total Protein


 


 4.6 G/DL


(6.4-8.2)  L


 


Albumin


 


 1.3 G/DL


(3.4-5.0)  L


 


Globulin  3.3 g/dL  


 


Albumin/Globulin Ratio


 


 0.4 (1.0-2.7)


L

















Intake and Output  


 


 9/18/20 9/19/20





 19:00 07:00


 


Intake Total 1120 ml 800 ml


 


Output Total 1030 ml 1100 ml


 


Balance 90 ml -300 ml


 


  


 


IV Total 500 ml 


 


Tube Feeding 620 ml 600 ml


 


Other  200 ml


 


Output Urine Total 980 ml 1050 ml


 


Stool Total 50 ml 50 ml








Objective


General Appearance:  WD/WN, no apparent distress, alert


EENT:  PERRL/EOMI, normal ENT inspection


Neck:  non-tender, normal alignment, supple, normal inspection


Cardiovascular:  normal peripheral pulses, normal rate, regular rhythm, no 

gallop/murmur, no JVD


Respiratory/Chest:  Mech vent; decreased breath sounds, crackles/rales, rhonchi 

- bilaterally, expiratory wheezing


Abdomen:  normal bowel sounds, non tender, soft, no organomegaly, no mass


Extremities:  normal range of motion


Neurologic:  CNs II-XII grossly normal


Skin:  normal pigmentation, warm/dry





Assessment/Plan


Problem List:  


(1) HCAP (healthcare-associated pneumonia)


Assessment & Plan:  Strep Group G.  Continue amikacin per ID=Dr Gomes.  

Pulmonary/Critical care=DR Cherry.  COVID NEG





(2) Sepsis


Assessment & Plan:  Staph haemolyticus.  Continue amikacin per ID=Dr Gomes





(3) Down's syndrome


(4) Dysphagia


Assessment & Plan:  S/P PEG





(5) Seizure disorder


Assessment & Plan:  Continue keppra and depakote





(6) Hypothyroidism


Assessment & Plan:  Continue synthroid





(7) Acute respiratory failure


Assessment & Plan:  Pulmonary = Dr Cherry; [facility] vent














Sanya Schultz MD               Sep 19, 2020 16:34

## 2020-09-19 NOTE — NUR
NURSE NOTES:

Patient not receiving full volume from ventilator. Assessed patient with RT and notified Dr. Cherry.

## 2020-09-19 NOTE — NUR
NURSE NOTES:

Received patient from [facility]. Pt A/A/Ox2. SB on cardiac monitor, HR 45. ET Tube at 7.5 22cm 
at lip line. Vent settings: AC 26, , FiO2 85%, PEEP 10. Left Upper Arm PICC intact 
w/drsg dry, clean and intact. G-tube infusing Vital AF at 50cc/hr. w/no residual noted. F/c 
draining well of clear yellow urine. Rectal tube in place. Bed locked in lowest position, 
side rails up x3, bed alarm engaged, head of bed elevated, call light within reach. Will 
continue POC.

## 2020-09-20 VITALS — DIASTOLIC BLOOD PRESSURE: 56 MMHG | SYSTOLIC BLOOD PRESSURE: 117 MMHG

## 2020-09-20 VITALS — SYSTOLIC BLOOD PRESSURE: 119 MMHG | DIASTOLIC BLOOD PRESSURE: 63 MMHG

## 2020-09-20 VITALS — SYSTOLIC BLOOD PRESSURE: 145 MMHG | DIASTOLIC BLOOD PRESSURE: 65 MMHG

## 2020-09-20 VITALS — DIASTOLIC BLOOD PRESSURE: 65 MMHG | SYSTOLIC BLOOD PRESSURE: 126 MMHG

## 2020-09-20 VITALS — SYSTOLIC BLOOD PRESSURE: 138 MMHG | DIASTOLIC BLOOD PRESSURE: 69 MMHG

## 2020-09-20 VITALS — SYSTOLIC BLOOD PRESSURE: 119 MMHG | DIASTOLIC BLOOD PRESSURE: 61 MMHG

## 2020-09-20 VITALS — SYSTOLIC BLOOD PRESSURE: 117 MMHG | DIASTOLIC BLOOD PRESSURE: 58 MMHG

## 2020-09-20 VITALS — SYSTOLIC BLOOD PRESSURE: 127 MMHG | DIASTOLIC BLOOD PRESSURE: 58 MMHG

## 2020-09-20 VITALS — SYSTOLIC BLOOD PRESSURE: 141 MMHG | DIASTOLIC BLOOD PRESSURE: 69 MMHG

## 2020-09-20 VITALS — SYSTOLIC BLOOD PRESSURE: 140 MMHG | DIASTOLIC BLOOD PRESSURE: 73 MMHG

## 2020-09-20 VITALS — SYSTOLIC BLOOD PRESSURE: 136 MMHG | DIASTOLIC BLOOD PRESSURE: 68 MMHG

## 2020-09-20 VITALS — DIASTOLIC BLOOD PRESSURE: 70 MMHG | SYSTOLIC BLOOD PRESSURE: 143 MMHG

## 2020-09-20 VITALS — SYSTOLIC BLOOD PRESSURE: 152 MMHG | DIASTOLIC BLOOD PRESSURE: 69 MMHG

## 2020-09-20 VITALS — SYSTOLIC BLOOD PRESSURE: 126 MMHG | DIASTOLIC BLOOD PRESSURE: 58 MMHG

## 2020-09-20 VITALS — SYSTOLIC BLOOD PRESSURE: 122 MMHG | DIASTOLIC BLOOD PRESSURE: 57 MMHG

## 2020-09-20 VITALS — DIASTOLIC BLOOD PRESSURE: 59 MMHG | SYSTOLIC BLOOD PRESSURE: 123 MMHG

## 2020-09-20 VITALS — DIASTOLIC BLOOD PRESSURE: 56 MMHG | SYSTOLIC BLOOD PRESSURE: 116 MMHG

## 2020-09-20 VITALS — SYSTOLIC BLOOD PRESSURE: 135 MMHG | DIASTOLIC BLOOD PRESSURE: 60 MMHG

## 2020-09-20 VITALS — DIASTOLIC BLOOD PRESSURE: 57 MMHG | SYSTOLIC BLOOD PRESSURE: 124 MMHG

## 2020-09-20 VITALS — SYSTOLIC BLOOD PRESSURE: 140 MMHG | DIASTOLIC BLOOD PRESSURE: 59 MMHG

## 2020-09-20 VITALS — DIASTOLIC BLOOD PRESSURE: 61 MMHG | SYSTOLIC BLOOD PRESSURE: 149 MMHG

## 2020-09-20 VITALS — DIASTOLIC BLOOD PRESSURE: 70 MMHG | SYSTOLIC BLOOD PRESSURE: 139 MMHG

## 2020-09-20 VITALS — DIASTOLIC BLOOD PRESSURE: 55 MMHG | SYSTOLIC BLOOD PRESSURE: 113 MMHG

## 2020-09-20 LAB
ADD MANUAL DIFF: YES
ALBUMIN SERPL-MCNC: 1.2 G/DL (ref 3.4–5)
ALBUMIN/GLOB SERPL: 0.4 {RATIO} (ref 1–2.7)
ALP SERPL-CCNC: 43 U/L (ref 46–116)
ALT SERPL-CCNC: 32 U/L (ref 12–78)
ANION GAP SERPL CALC-SCNC: 5 MMOL/L (ref 5–15)
AST SERPL-CCNC: 23 U/L (ref 15–37)
BILIRUB SERPL-MCNC: 0.6 MG/DL (ref 0.2–1)
BUN SERPL-MCNC: 25 MG/DL (ref 7–18)
CALCIUM SERPL-MCNC: 7.7 MG/DL (ref 8.5–10.1)
CHLORIDE SERPL-SCNC: 115 MMOL/L (ref 98–107)
CO2 SERPL-SCNC: 30 MMOL/L (ref 21–32)
CREAT SERPL-MCNC: 0.7 MG/DL (ref 0.55–1.3)
ERYTHROCYTE [DISTWIDTH] IN BLOOD BY AUTOMATED COUNT: 18.8 % (ref 11.6–14.8)
GLOBULIN SER-MCNC: 3.4 G/DL
HCT VFR BLD CALC: 26 % (ref 37–47)
HGB BLD-MCNC: 8.8 G/DL (ref 12–16)
MCV RBC AUTO: 92 FL (ref 80–99)
PHOSPHATE SERPL-MCNC: 3 MG/DL (ref 2.5–4.9)
PLATELET # BLD: 90 K/UL (ref 150–450)
POTASSIUM SERPL-SCNC: 2.6 MMOL/L (ref 3.5–5.1)
RBC # BLD AUTO: 2.83 M/UL (ref 4.2–5.4)
SODIUM SERPL-SCNC: 151 MMOL/L (ref 136–145)
WBC # BLD AUTO: 12.4 K/UL (ref 4.8–10.8)

## 2020-09-20 RX ADMIN — SODIUM CHLORIDE SCH MLS/HR: 900 INJECTION, SOLUTION INTRAVENOUS at 07:45

## 2020-09-20 RX ADMIN — INSULIN DETEMIR SCH UNITS: 100 INJECTION, SOLUTION SUBCUTANEOUS at 09:11

## 2020-09-20 RX ADMIN — CHLORHEXIDINE GLUCONATE SCH APPLIC: 213 SOLUTION TOPICAL at 20:59

## 2020-09-20 RX ADMIN — DOPAMINE HYDROCHLORIDE IN DEXTROSE SCH MLS/HR: 1.6 INJECTION, SOLUTION INTRAVENOUS at 10:46

## 2020-09-20 RX ADMIN — INSULIN ASPART SCH UNITS: 100 INJECTION, SOLUTION INTRAVENOUS; SUBCUTANEOUS at 06:00

## 2020-09-20 RX ADMIN — MIDODRINE HYDROCHLORIDE SCH MG: 10 TABLET ORAL at 13:07

## 2020-09-20 RX ADMIN — HYDROCORTISONE SODIUM SUCCINATE SCH MG: 100 INJECTION, POWDER, FOR SOLUTION INTRAMUSCULAR; INTRAVENOUS at 06:02

## 2020-09-20 RX ADMIN — INSULIN DETEMIR SCH UNITS: 100 INJECTION, SOLUTION SUBCUTANEOUS at 21:01

## 2020-09-20 RX ADMIN — MIDODRINE HYDROCHLORIDE SCH MG: 10 TABLET ORAL at 21:37

## 2020-09-20 RX ADMIN — INSULIN ASPART SCH UNITS: 100 INJECTION, SOLUTION INTRAVENOUS; SUBCUTANEOUS at 00:14

## 2020-09-20 RX ADMIN — INSULIN ASPART SCH UNITS: 100 INJECTION, SOLUTION INTRAVENOUS; SUBCUTANEOUS at 17:16

## 2020-09-20 RX ADMIN — VALPROIC ACID SCH MG: 250 SOLUTION ORAL at 21:37

## 2020-09-20 RX ADMIN — HYDROCORTISONE SODIUM SUCCINATE SCH MG: 100 INJECTION, POWDER, FOR SOLUTION INTRAMUSCULAR; INTRAVENOUS at 13:07

## 2020-09-20 RX ADMIN — VALPROIC ACID SCH MG: 250 SOLUTION ORAL at 06:03

## 2020-09-20 RX ADMIN — MIDODRINE HYDROCHLORIDE SCH MG: 10 TABLET ORAL at 06:03

## 2020-09-20 RX ADMIN — PANTOPRAZOLE SODIUM SCH MG: 40 INJECTION, POWDER, FOR SOLUTION INTRAVENOUS at 09:10

## 2020-09-20 RX ADMIN — HYDROCORTISONE SODIUM SUCCINATE SCH MG: 100 INJECTION, POWDER, FOR SOLUTION INTRAMUSCULAR; INTRAVENOUS at 21:37

## 2020-09-20 RX ADMIN — VALPROIC ACID SCH MG: 250 SOLUTION ORAL at 13:07

## 2020-09-20 RX ADMIN — INSULIN ASPART SCH UNITS: 100 INJECTION, SOLUTION INTRAVENOUS; SUBCUTANEOUS at 12:00

## 2020-09-20 NOTE — CARDIOLOGY PROGRESS NOTE
Assessment/Plan


Problem List:  


(1) Diarrhea


(2) Anemia


(3) Hypernatremia


(4) Hypokalemia


(5) Down's syndrome


(6) Acute respiratory failure


(7) Pneumonia


(8) Respiratory failure requiring intubation


Status:  stable, not improved, unchanged


Status Narrative


Pt w/ Down's syndrome, admitted w/ pneumonia, respiratory failure. previous s 

epi bacteremia. Covid negative


She has sinus bradycardia, while sedated, without hemodynamic compromise.


Diarrhea - ? cause.  C diff negative


Has hypokalemia, hypernatrema, due to diarrhea, vol depletion.


Assessment/Plan


continue free water and K supplementation


Check stool guiacs. Evaluation for cause of diarrhea per primary team


continue iv abx per ID,  Wean Fi02, as tolerated. . .





Subjective


ROS Limited/Unobtainable:  Yes


Subjective


Cardiology for Dr. Jorgensen





Intubated, opens eyes to voice





Objective





Last 24 Hour Vital Signs








  Date Time  Temp Pulse Resp B/P (MAP) Pulse Ox O2 Delivery O2 Flow Rate FiO2


 


9/20/20 19:08  44 26     85


 


9/20/20 19:03  42 26 135/60 (85) 100   


 


9/20/20 18:00  45 23 116/54 (74) 100   


 


9/20/20 18:00  51 28 89/56 (67) 100   


 


9/20/20 17:00  48  141/69 (93) 99   


 


9/20/20 17:00  48 26 141/69 (93) 100   


 


9/20/20 16:00  47      


 


9/20/20 16:00 96.8 44 30 143/70 (94) 98   


 


9/20/20 16:00      Mechanical Ventilator  





      Mechanical Ventilator  


 


9/20/20 16:00  45      


 


9/20/20 16:00        85


 


9/20/20 15:00  48 20 138/69 (92) 97   


 


9/20/20 14:56  64 26     85


 


9/20/20 14:00  44 20 117/58 (77) 97   


 


9/20/20 13:00  51 20 117/56 (76) 97   


 


9/20/20 12:00        85


 


9/20/20 12:00  47      


 


9/20/20 12:00 98.2 48 18 123/59 (80) 98   


 


9/20/20 12:00      Mechanical Ventilator  





      Mechanical Ventilator  


 


9/20/20 11:03  40 26     85


 


9/20/20 11:00  41 26 119/61 (80) 96   


 


9/20/20 10:46    149/61    


 


9/20/20 10:00  44 26 139/70 (93) 97   


 


9/20/20 09:00  46 26 124/57 (79) 97   


 


9/20/20 08:00  45      


 


9/20/20 08:00  47      


 


9/20/20 08:00        85


 


9/20/20 08:00 97.5 46 29 149/61 (90) 97   


 


9/20/20 08:00      Mechanical Ventilator  





      Mechanical Ventilator  


 


9/20/20 07:45    122/57    


 


9/20/20 07:28  49 26     85


 


9/20/20 07:00  44 42 126/58 (80) 99   


 


9/20/20 06:00 97.2 52 29 122/57 (78) 99   


 


9/20/20 05:00  47 46 113/55 (74) 93   


 


9/20/20 04:00      Mechanical Ventilator  





      Mechanical Ventilator  


 


9/20/20 04:00  47      


 


9/20/20 04:00  46 29  99   


 


9/20/20 04:00        85


 


9/20/20 03:25  47 26     85


 


9/20/20 03:00  47 30 126/65 (85) 100   


 


9/20/20 02:00  43 26 140/59 (86) 100   


 


9/20/20 01:00  41 26 136/68 (90) 99   


 


9/20/20 00:00 98.2 42 26 152/69 (96) 99   


 


9/20/20 00:00        85


 


9/20/20 00:00      Mechanical Ventilator  





      Mechanical Ventilator  


 


9/19/20 23:00  43 26 144/79 (100) 100   


 


9/19/20 22:55  40 26     85


 


9/19/20 22:00  46 26 157/65 (95) 100   


 


9/19/20 21:00  53 26 128/57 (80) 100   


 


9/19/20 20:00      Mechanical Ventilator  





      Mechanical Ventilator  


 


9/19/20 20:00  48      


 


9/19/20 20:00 98.8 52 26 135/62 (86) 100   


 


9/19/20 20:00        85








General Appearance:  on vent


EENT:  PERRL/EOMI, other - ET tube


Neck:  no JVD


Rhythm:  NSR


Cardiovascular:  normal rate, regular rhythm, no gallop/murmur


Respiratory/Chest:  other - occ rhonchi


Abdomen:  non tender, soft, other - + g tube


Extremities:  moderate edema - 1-2+ peripheral edema bilat











Intake and Output  


 


 9/19/20 9/20/20





 19:00 07:00


 


Intake Total 950 ml 600 ml


 


Output Total 1435 ml 650 ml


 


Balance -485 ml -50 ml


 


  


 


IV Total 300 ml 


 


Tube Feeding 650 ml 600 ml


 


Output Urine Total 1335 ml 600 ml


 


Stool Total 100 ml 50 ml











Laboratory Tests








Test


 9/20/20


05:10 9/20/20


09:06 9/20/20


18:00


 


White Blood Count


 12.4 K/UL


(4.8-10.8)  H 


 





 


Red Blood Count


 2.83 M/UL


(4.20-5.40)  L 


 





 


Hemoglobin


 8.8 G/DL


(12.0-16.0)  L 


 





 


Hematocrit


 26.0 %


(37.0-47.0)  L 


 





 


Mean Corpuscular Volume 92 FL (80-99)    


 


Mean Corpuscular Hemoglobin


 31.3 PG


(27.0-31.0)  H 


 





 


Mean Corpuscular Hemoglobin


Concent 34.0 G/DL


(32.0-36.0) 


 





 


Red Cell Distribution Width


 18.8 %


(11.6-14.8)  H 


 





 


Platelet Count


 90 K/UL


(150-450)  L 


 





 


Mean Platelet Volume


 9.0 FL


(6.5-10.1) 


 





 


Neutrophils (%) (Auto)


 % (45.0-75.0)


 


 





 


Lymphocytes (%) (Auto)


 % (20.0-45.0)


 


 





 


Monocytes (%) (Auto)  % (1.0-10.0)    


 


Eosinophils (%) (Auto)  % (0.0-3.0)    


 


Basophils (%) (Auto)  % (0.0-2.0)    


 


Differential Total Cells


Counted 100  


 


 





 


Neutrophils % (Manual) 92 % (45-75)  H  


 


Lymphocytes % (Manual) 5 % (20-45)  L  


 


Monocytes % (Manual) 3 % (1-10)    


 


Eosinophils % (Manual) 0 % (0-3)    


 


Basophils % (Manual) 0 % (0-2)    


 


Band Neutrophils 0 % (0-8)    


 


Platelet Estimate Decreased  L  


 


Platelet Morphology Normal    


 


Hypochromasia 1+    


 


Anisocytosis 2+    


 


Sodium Level


 151 MMOL/L


(136-145)  H 


 





 


Potassium Level


 2.6 MMOL/L


(3.5-5.1)  *L 


 





 


Chloride Level


 115 MMOL/L


()  H 


 





 


Carbon Dioxide Level


 30 MMOL/L


(21-32) 


 





 


Anion Gap


 5 mmol/L


(5-15) 


 





 


Blood Urea Nitrogen


 25 mg/dL


(7-18)  H 


 





 


Creatinine


 0.7 MG/DL


(0.55-1.30) 


 





 


Estimat Glomerular Filtration


Rate > 60 mL/min


(>60) 


 





 


Glucose Level


 132 MG/DL


()  H 


 





 


Calcium Level


 7.7 MG/DL


(8.5-10.1)  L 


 





 


Phosphorus Level


 3.0 MG/DL


(2.5-4.9) 


 





 


Magnesium Level


 1.7 MG/DL


(1.8-2.4)  L 


 





 


Total Bilirubin


 0.6 MG/DL


(0.2-1.0) 


 





 


Aspartate Amino Transf


(AST/SGOT) 23 U/L (15-37)


 


 





 


Alanine Aminotransferase


(ALT/SGPT) 32 U/L (12-78)


 


 





 


Alkaline Phosphatase


 43 U/L


()  L 


 





 


Total Protein


 4.6 G/DL


(6.4-8.2)  L 


 





 


Albumin


 1.2 G/DL


(3.4-5.0)  L 


 





 


Globulin 3.4 g/dL    


 


Albumin/Globulin Ratio


 0.4 (1.0-2.7)


L 


 





 


POC Whole Blood Glucose


 


 153 MG/DL


()  H 





 


Stool Occult Blood   Pending  











Microbiology








 Date/Time


Source Procedure


Growth Status





 


 9/19/20 16:34


Urine,Clean Catch Urine Culture - Preliminary


NO GROWTH Resulted





 9/19/20 16:34


Sputum Gram Stain - Final Resulted


 


 9/19/20 16:34


Sputum Sputum Culture - Preliminary


NO GROWTH Resulted

















Delmy Parry MD          Sep 20, 2020 20:03

## 2020-09-20 NOTE — NUR
NURSE HAND-OFF REPORT: 



Latest Vital Signs: Temperature 96.8 , Pulse 44 , B/P 135 /60 , Respiratory Rate 26 , O2 SAT 
100 , Mechanical Ventilator, O2 Flow Rate 15.0 .  

Vital Sign Comment: stable



EKG Rhythm: Sinus Bradycardia

Rhythm change?: N 

MD Notified?: -N aware

MD Response: 



Latest Momin Fall Score: 70  

Fall Risk: High Risk 

Safety Measures: Call light Within Reach, Bed Alarm Zone 1, Side Rails Side Rails x3, Bed 
position Low and Locked.

Fall Precautions: 

Yellow Socks

Door Sign

Patient Fall Education



Report given to Reema TRAYLOR.

## 2020-09-20 NOTE — PULMONOLGY CRITICAL CARE NOTE
Critical Care - Asmt/Plan


Problems:  


(1) Acute respiratory failure


(2) Bacteremia


(3) Pneumonia


(4) Sepsis


(5) HCAP (healthcare-associated pneumonia)


(6) Seizure disorder


(7) Down's syndrome


Respiratory:  monitor respiratory rate, adjust FIO2, CXR


Cardiac:  continue to monitor HR/BP


Renal:  F/U I&O, keep IV fluid, check electrolytes


Infectious Disease:  check cultures


Gastrointestinal:  continue feedings/current rate


Endocrine:  monitor blood sugar, continue sliding scale insulin


Hematologic:  monitor H/H, transfuse if hgb<8.5


Neurologic:  PRN Morphine, keep patient comfortable


Prophylaxis:  Protonix


Notes Reviewed:  internist, renal


Discussed with:  nurses, consultants, 





Critical Care - Objective





Last 24 Hour Vital Signs








  Date Time  Temp Pulse Resp B/P (MAP) Pulse Ox O2 Delivery O2 Flow Rate FiO2


 


9/20/20 08:00        85


 


9/20/20 08:00 97.5 46 29 149/61 (90) 97   


 


9/20/20 08:00      Mechanical Ventilator  





      Mechanical Ventilator  


 


9/20/20 07:45    122/57    


 


9/20/20 07:28  49 26     85


 


9/20/20 07:00  44 42 126/58 (80) 99   


 


9/20/20 06:00 97.2 52 29 122/57 (78) 99   


 


9/20/20 05:00  47 46 113/55 (74) 93   


 


9/20/20 04:00      Mechanical Ventilator  





      Mechanical Ventilator  


 


9/20/20 04:00  47      


 


9/20/20 04:00  46 29  99   


 


9/20/20 04:00        85


 


9/20/20 03:25  47 26     85


 


9/20/20 03:00  47 30 126/65 (85) 100   


 


9/20/20 02:00  43 26 140/59 (86) 100   


 


9/20/20 01:00  41 26 136/68 (90) 99   


 


9/20/20 00:00 98.2 42 26 152/69 (96) 99   


 


9/20/20 00:00        85


 


9/20/20 00:00      Mechanical Ventilator  





      Mechanical Ventilator  


 


9/19/20 23:00  43 26 144/79 (100) 100   


 


9/19/20 22:55  40 26     85


 


9/19/20 22:00  46 26 157/65 (95) 100   


 


9/19/20 21:00  53 26 128/57 (80) 100   


 


9/19/20 20:00      Mechanical Ventilator  





      Mechanical Ventilator  


 


9/19/20 20:00  48      


 


9/19/20 20:00 98.8 52 26 135/62 (86) 100   


 


9/19/20 20:00        85


 


9/19/20 19:31  42 26     85


 


9/19/20 19:00  43 26 145/68 (93) 100   


 


9/19/20 18:00  46 25 129/70 (89) 100   


 


9/19/20 17:00  67 31 138/84 (102) 98   


 


9/19/20 16:00      Mechanical Ventilator  





      Mechanical Ventilator  


 


9/19/20 16:00        85


 


9/19/20 16:00 98.1 49 42 137/70 (92) 98   


 


9/19/20 15:28  45      


 


9/19/20 15:20  52 26     85


 


9/19/20 15:00  53 35 131/62 (85) 95   


 


9/19/20 14:00  53 43 112/61 (78) 94   


 


9/19/20 13:00  49 48 98/53 (68) 96   


 


9/19/20 12:00 98.3 52 39 106/59 (75) 93   


 


9/19/20 12:00        85


 


9/19/20 12:00      Mechanical Ventilator  





      Mechanical Ventilator  


 


9/19/20 11:46  53 26     85


 


9/19/20 11:42  55      


 


9/19/20 11:00  56 41 117/60 (79) 95   








Status:  awake


Condition:  critical


HEENT:  atraumatic


Neck:  full ROM


Heart:  HR/BP stable


Abdomen:  soft, active bowel sounds, feeding tube


Extremities:  no C/C/E


Decubiti:  location


Micro:





Microbiology








 Date/Time


Source Procedure


Growth Status





 


 9/19/20 16:34


Urine,Clean Catch Urine Culture - Preliminary


NO GROWTH Resulted





 9/19/20 16:34


Sputum Gram Stain - Final Resulted


 


 9/19/20 16:34


Sputum Sputum Culture - Preliminary


NO GROWTH Resulted








Accucheck:  153





Critical Care - Subjective


ROS Limited/Unobtainable:  Yes


Condition:  critical


FI02:  85


Vent Support Breath Rate:  26


Vent Support Mode:  AC


Vent Tidal Volume:  500


Sputum Amount:  Moderate


PEEP:  10.0


PIP:  51


Tube Feeding Amount:  50


I&O:











Intake and Output  


 


 9/19/20 9/20/20





 19:00 07:00


 


Intake Total 950 ml 600 ml


 


Output Total 1435 ml 650 ml


 


Balance -485 ml -50 ml


 


  


 


IV Total 300 ml 


 


Tube Feeding 650 ml 600 ml


 


Output Urine Total 1335 ml 600 ml


 


Stool Total 100 ml 50 ml








ET-Tube:  7.5


ET Position:  22


Labs:





Laboratory Tests








Test


 9/20/20


05:10 9/20/20


09:06


 


White Blood Count


 12.4 K/UL


(4.8-10.8)  H 





 


Red Blood Count


 2.83 M/UL


(4.20-5.40)  L 





 


Hemoglobin


 8.8 G/DL


(12.0-16.0)  L 





 


Hematocrit


 26.0 %


(37.0-47.0)  L 





 


Mean Corpuscular Volume 92 FL (80-99)   


 


Mean Corpuscular Hemoglobin


 31.3 PG


(27.0-31.0)  H 





 


Mean Corpuscular Hemoglobin


Concent 34.0 G/DL


(32.0-36.0) 





 


Red Cell Distribution Width


 18.8 %


(11.6-14.8)  H 





 


Platelet Count


 90 K/UL


(150-450)  L 





 


Mean Platelet Volume


 9.0 FL


(6.5-10.1) 





 


Neutrophils (%) (Auto)


 % (45.0-75.0)


 





 


Lymphocytes (%) (Auto)


 % (20.0-45.0)


 





 


Monocytes (%) (Auto)  % (1.0-10.0)   


 


Eosinophils (%) (Auto)  % (0.0-3.0)   


 


Basophils (%) (Auto)  % (0.0-2.0)   


 


Differential Total Cells


Counted 100  


 





 


Neutrophils % (Manual) 92 % (45-75)  H 


 


Lymphocytes % (Manual) 5 % (20-45)  L 


 


Monocytes % (Manual) 3 % (1-10)   


 


Eosinophils % (Manual) 0 % (0-3)   


 


Basophils % (Manual) 0 % (0-2)   


 


Band Neutrophils 0 % (0-8)   


 


Platelet Estimate Decreased  L 


 


Platelet Morphology Normal   


 


Hypochromasia 1+   


 


Anisocytosis 2+   


 


Sodium Level


 151 MMOL/L


(136-145)  H 





 


Potassium Level


 2.6 MMOL/L


(3.5-5.1)  *L 





 


Chloride Level


 115 MMOL/L


()  H 





 


Carbon Dioxide Level


 30 MMOL/L


(21-32) 





 


Anion Gap


 5 mmol/L


(5-15) 





 


Blood Urea Nitrogen


 25 mg/dL


(7-18)  H 





 


Creatinine


 0.7 MG/DL


(0.55-1.30) 





 


Estimat Glomerular Filtration


Rate > 60 mL/min


(>60) 





 


Glucose Level


 132 MG/DL


()  H 





 


Calcium Level


 7.7 MG/DL


(8.5-10.1)  L 





 


Phosphorus Level


 3.0 MG/DL


(2.5-4.9) 





 


Magnesium Level


 1.7 MG/DL


(1.8-2.4)  L 





 


Total Bilirubin


 0.6 MG/DL


(0.2-1.0) 





 


Aspartate Amino Transf


(AST/SGOT) 23 U/L (15-37)


 





 


Alanine Aminotransferase


(ALT/SGPT) 32 U/L (12-78)


 





 


Alkaline Phosphatase


 43 U/L


()  L 





 


Total Protein


 4.6 G/DL


(6.4-8.2)  L 





 


Albumin


 1.2 G/DL


(3.4-5.0)  L 





 


Globulin 3.4 g/dL   


 


Albumin/Globulin Ratio


 0.4 (1.0-2.7)


L 





 


POC Whole Blood Glucose


 


 153 MG/DL


()  H

















Chano Cherry MD              Sep 20, 2020 10:15

## 2020-09-20 NOTE — GENERAL PROGRESS NOTE
Subjective


ROS Limited/Unobtainable:  No


Allergies:  


Coded Allergies:  


     No Known Allergies (Unverified , 1/8/19)





Objective





Last 24 Hour Vital Signs








  Date Time  Temp Pulse Resp B/P (MAP) Pulse Ox O2 Delivery O2 Flow Rate FiO2


 


9/20/20 08:00        85


 


9/20/20 08:00 97.5 46 29 149/61 (90) 97   


 


9/20/20 08:00      Mechanical Ventilator  





      Mechanical Ventilator  


 


9/20/20 07:45    122/57    


 


9/20/20 07:28  49 26     85


 


9/20/20 07:00  44 42 126/58 (80) 99   


 


9/20/20 06:00 97.2 52 29 122/57 (78) 99   


 


9/20/20 05:00  47 46 113/55 (74) 93   


 


9/20/20 04:00      Mechanical Ventilator  





      Mechanical Ventilator  


 


9/20/20 04:00  47      


 


9/20/20 04:00  46 29  99   


 


9/20/20 04:00        85


 


9/20/20 03:25  47 26     85


 


9/20/20 03:00  47 30 126/65 (85) 100   


 


9/20/20 02:00  43 26 140/59 (86) 100   


 


9/20/20 01:00  41 26 136/68 (90) 99   


 


9/20/20 00:00 98.2 42 26 152/69 (96) 99   


 


9/20/20 00:00        85


 


9/20/20 00:00      Mechanical Ventilator  





      Mechanical Ventilator  


 


9/19/20 23:00  43 26 144/79 (100) 100   


 


9/19/20 22:55  40 26     85


 


9/19/20 22:00  46 26 157/65 (95) 100   


 


9/19/20 21:00  53 26 128/57 (80) 100   


 


9/19/20 20:00      Mechanical Ventilator  





      Mechanical Ventilator  


 


9/19/20 20:00  48      


 


9/19/20 20:00 98.8 52 26 135/62 (86) 100   


 


9/19/20 20:00        85


 


9/19/20 19:31  42 26     85


 


9/19/20 19:00  43 26 145/68 (93) 100   


 


9/19/20 18:00  46 25 129/70 (89) 100   


 


9/19/20 17:00  67 31 138/84 (102) 98   


 


9/19/20 16:00      Mechanical Ventilator  





      Mechanical Ventilator  


 


9/19/20 16:00        85


 


9/19/20 16:00 98.1 49 42 137/70 (92) 98   


 


9/19/20 15:28  45      


 


9/19/20 15:20  52 26     85


 


9/19/20 15:00  53 35 131/62 (85) 95   


 


9/19/20 14:00  53 43 112/61 (78) 94   


 


9/19/20 13:00  49 48 98/53 (68) 96   


 


9/19/20 12:00 98.3 52 39 106/59 (75) 93   


 


9/19/20 12:00        85


 


9/19/20 12:00      Mechanical Ventilator  





      Mechanical Ventilator  


 


9/19/20 11:46  53 26     85


 


9/19/20 11:42  55      


 


9/19/20 11:00  56 41 117/60 (79) 95   


 


9/19/20 10:00  55 26 134/68 (90) 94   

















Intake and Output  


 


 9/19/20 9/20/20





 19:00 07:00


 


Intake Total 950 ml 600 ml


 


Output Total 1435 ml 650 ml


 


Balance -485 ml -50 ml


 


  


 


IV Total 300 ml 


 


Tube Feeding 650 ml 600 ml


 


Output Urine Total 1335 ml 600 ml


 


Stool Total 100 ml 50 ml








Laboratory Tests


9/20/20 05:10: 


White Blood Count 12.4H, Red Blood Count 2.83L, Hemoglobin 8.8L, Hematocrit 

26.0L, Mean Corpuscular Volume 92, Mean Corpuscular Hemoglobin 31.3H, Mean 

Corpuscular Hemoglobin Concent 34.0, Red Cell Distribution Width 18.8H, Platelet

Count 90L, Mean Platelet Volume 9.0, Neutrophils (%) (Auto) , Lymphocytes (%) 

(Auto) , Monocytes (%) (Auto) , Eosinophils (%) (Auto) , Basophils (%) (Auto) , 

Differential Total Cells Counted 100, Neutrophils % (Manual) 92H, Lymphocytes % 

(Manual) 5L, Monocytes % (Manual) 3, Eosinophils % (Manual) 0, Basophils % 

(Manual) 0, Band Neutrophils 0, Platelet Estimate DecreasedL, Platelet 

Morphology Normal, Hypochromasia 1+, Anisocytosis 2+, Sodium Level 151H, 

Potassium Level 2.6*L, Chloride Level 115H, Carbon Dioxide Level 30, Anion Gap 

5, Blood Urea Nitrogen 25H, Creatinine 0.7, Estimat Glomerular Filtration Rate >

60, Glucose Level 132H, Calcium Level 7.7L, Phosphorus Level 3.0, Magnesium 

Level 1.7L, Total Bilirubin 0.6, Aspartate Amino Transf (AST/SGOT) 23, Alanine 

Aminotransferase (ALT/SGPT) 32, Alkaline Phosphatase 43L, Total Protein 4.6L, 

Albumin 1.2L, Globulin 3.4, Albumin/Globulin Ratio 0.4L


9/20/20 09:06: POC Whole Blood Glucose 153H


Height (Feet):  5


Height (Inches):  3.00


Weight (Pounds):  172


General Appearance:  no apparent distress


EENT:  normal ENT inspection


Neck:  supple


Cardiovascular:  normal rate


Respiratory/Chest:  decreased breath sounds


Abdomen:  normal bowel sounds, non tender, soft


Extremities:  non-tender





Assessment/Plan


Status:  stable, unchanged


Assessment/Plan:


1. History of Down syndrome.


2. Dysphagia with G-tube.


3. Seizure disorder.


4. Hypothyroidism.


5. DAVID.


6. Pneumonia.


7. Sepsis.








fu H&H


prn blood transfusion to keep HGB above 7


ppi


GTF


hold GI procedures for now


stool ob + 1/2











Ted Nagy MD             Sep 20, 2020 09:16

## 2020-09-20 NOTE — NUR
NURSE NOTES:

 Reinserted Rectal tube ,draining liquid brown stools.Turned to Lt side with Rt side up.

## 2020-09-20 NOTE — NUR
NURSE NOTES:

pt awake on and off  but sleeps most of the time,remains bradycardic while 
asleep,asymptomatic no distress presented .

## 2020-09-20 NOTE — NUR
NURSE NOTES:

Pt awake  on and off ,noted no resp distress,always Bradycardic thru out the shift. Patient : Efrain Capps Age: 78 year old Sex: male   MRN: [id number] Encounter Date: 9/6/2018      History     Chief Complaint   Patient presents with   • Abdominal Pain     78-year-old male patient comes to emergency Department with pain and bulging to left groin this morning. Patient states that the pain and the bulge subsided when he laid flat. He reports history of right inguinal hernia approximately 10+ years ago. He denies any pain currently. He had one episode of diarrhea this morning but none since. He denies any dark tarry or bloody stools, nausea, vomiting, or testicular pain or swelling.            No Known Allergies    Discharge Medication List as of 9/6/2018 11:24 AM      CONTINUE these medications which have NOT CHANGED    Details   Valacyclovir HCl 1000 MG TABS Take 2 tab and repeat in 12 hours for cold sore. Treat each incident for only 1 day each.Eprescribe, Disp-12 tablet, R-2             Discharge Medication List as of 9/6/2018 11:24 AM          Past Medical History:   Diagnosis Date   • Cataract    • Herpes ocular        Past Surgical History:   Procedure Laterality Date   • COLONOSCOPY      TWICE BEFORE 2014   • HERNIA REPAIR         Family History   Problem Relation Age of Onset   • Heart disease Mother    • Heart disease Brother    • COPD Brother    • Heart disease Brother    • Heart disease Maternal Uncle        Social History   Substance Use Topics   • Smoking status: Former Smoker     Quit date: 1/1/1965   • Smokeless tobacco: Never Used   • Alcohol use Yes      Comment: occassionally       Review of Systems   Constitutional: Negative for chills, fatigue and fever.   HENT: Negative for congestion, ear pain, rhinorrhea, sore throat and trouble swallowing.    Eyes: Negative for pain and discharge.   Respiratory: Negative for shortness of breath.    Cardiovascular: Negative for chest pain.   Gastrointestinal: Positive for abdominal pain (L groin pain) and diarrhea (single episode). Negative for  abdominal distention, blood in stool, constipation, nausea and vomiting.   Genitourinary: Negative for difficulty urinating, dysuria, flank pain, frequency, hematuria, scrotal swelling and testicular pain.   Musculoskeletal: Negative for arthralgias, back pain, myalgias, neck pain and neck stiffness.   Skin: Negative for rash.   Neurological: Negative for dizziness, syncope, light-headedness and headaches.   Hematological: Negative for adenopathy. Does not bruise/bleed easily.   Psychiatric/Behavioral: Negative for confusion.       Physical Exam     ED Triage Vitals [09/06/18 1036]   ED Triage Vitals Group      Temp 96.8 °F (36 °C)      Pulse 59      Resp 16      /74      SpO2 98 %      EtCO2 mmHg       Height 5' 7\" (1.702 m)      Weight 150 lb (68 kg)      Weight Scale Used        Physical Exam   Constitutional: He is oriented to person, place, and time. He appears well-developed and well-nourished.  Non-toxic appearance. No distress.   HENT:   Right Ear: External ear normal.   Left Ear: External ear normal.   Nose: Nose normal. No mucosal edema or rhinorrhea.   Mouth/Throat: Uvula is midline, oropharynx is clear and moist and mucous membranes are normal. Mucous membranes are not dry. No oral lesions. No uvula swelling. No oropharyngeal exudate, posterior oropharyngeal edema or posterior oropharyngeal erythema.   Eyes: Conjunctivae and lids are normal.   Neck: Trachea normal, normal range of motion and phonation normal. Neck supple.   Cardiovascular: Normal rate, regular rhythm, S1 normal, S2 normal and intact distal pulses.  Exam reveals no gallop and no friction rub.    No murmur heard.  Pulmonary/Chest: Effort normal and breath sounds normal. No respiratory distress. He has no decreased breath sounds. He has no wheezes. He has no rhonchi. He has no rales.   Abdominal: Soft. Normal appearance and bowel sounds are normal. He exhibits no distension and no mass. There is no hepatosplenomegaly. There is no  tenderness. There is no rigidity, no rebound, no guarding and no CVA tenderness.   No inguinal mass or bulge appreciated   Genitourinary: Testes normal. Right testis shows no mass, no swelling and no tenderness. Left testis shows no mass, no swelling and no tenderness.   Neurological: He is alert and oriented to person, place, and time.   Skin: Skin is warm, dry and intact. No rash noted. He is not diaphoretic.   Psychiatric: He has a normal mood and affect.       ED Course     Procedures      MDM     11:23 AM   Recheck on patient. Discussed with patient ED findings and plan for discharge. Patient was given ED warnings, discharge instructions, and follow up information to go home with. Patient understands and agrees with plan for discharge. Any questions have been answered.      Clinical Impression     ED Diagnosis   1. Left inguinal hernia         Disposition        Discharge 9/6/2018 11:23 AM  Efrain SILVA Génesis discharge to home/self care.    Follow Up:  Johnny Ray MD  [id number] [place] [address] [phone]      for follow up of L groin pain and possible hernia       Discharge Medication List as of 9/6/2018 11:24 AM          Patient is discharged to home/self care in stable condition.     Patient was evaluated and treated by Rachana Swann PA-C under the supervision of Dr. Sriram Conde.    This is Sriram Conde dictating. This patient was interviewed, examined and interventions undertaken by advanced practice provider, Rachana Swann, physician assistant, under my supervision, after extensive discussion with myself.  I personally interviewed the patient as well. Appropriate home care and follow-up instructions have been provided. I concur with the above documentation.                   Rachana Swann PA-C  09/07/18 [id number]       Sriram Conde MD  09/07/18 1808

## 2020-09-20 NOTE — NUR
NURSE NOTES:

Report received from Finn Lopez RN.Pt asleep noted no resp distress,orally intubated,ETT 
7.5 lip line 22 cm ,AC26,,Fio2 85%,PEEP5,GTF Vital AF1.2 at 50 ml/hr,no residual 
noted,Valle cath draining yellow urine,Rectal tube in placed with liquid brown stools,skin 
warm and dry IV site to CARLOS PICC line intact ,SR up x2 HOB elevated ,bed lock in lowest 
position ,will continue with plans of care.

## 2020-09-20 NOTE — INTERNAL MED PROGRESS NOTE
Subjective


Date of Service:  Sep 20, 2020


Physician Name


Sanya Schultz


Attending Physician


Chano Cherry MD





Current Medications








 Medications


  (Trade)  Dose


 Ordered  Sig/Didi


 Route


 PRN Reason  Start Time


 Stop Time Status Last Admin


Dose Admin


 


 Acetaminophen


  (Tylenol)  650 mg  Q4H  PRN


 GT


 FEVER  8/26/20 11:45


 9/25/20 11:44  9/18/20 12:48





 


 Chlorhexidine


 Gluconate


  (Beatrice-Hex 2%)  1 applic  DAILY@2000


 TOPIC


   8/31/20 20:00


 11/29/20 19:59  9/19/20 20:55





 


 Clotrimazole


  (Lotrimin)  1 applic  Q12HR


 TOPIC


   8/30/20 13:00


 11/28/20 12:59  9/20/20 09:09





 


 Dextrose


  (Dextrose 50%)  25 ml  Q30M  PRN


 IV


 Hypoglycemia  8/26/20 11:30


 11/20/20 11:29   





 


 Dextrose


  (Dextrose 50%)  50 ml  Q30M  PRN


 IV


 Hypoglycemia  8/26/20 11:30


 11/20/20 11:29   





 


 Dopamine HCl/


 Dextrose  250 ml @ 0


 mls/hr  Q24H


 IV


   9/4/20 11:15


 12/3/20 11:14  9/6/20 19:47





 


 Hydrocortisone


  (Solu-CORTEF)  100 mg  EVERY 8  HOURS


 IV


   8/30/20 14:00


 11/28/20 13:59  9/20/20 13:07





 


 Insulin Aspart


  (NovoLOG)    Q6HR


 SUBQ


   9/18/20 12:00


 12/17/20 11:59  9/20/20 00:14





 


 Insulin Detemir


  (Levemir)  10 units  Q12HR


 SUBQ


   9/18/20 10:00


 12/17/20 09:59  9/20/20 09:11





 


 Levothyroxine


 Sodium


  (Synthroid)  75 mcg  DAILY


 GT


   8/27/20 09:00


 9/22/20 08:59  9/20/20 09:10





 


 Loperamide HCl


  (Imodium)  2 mg  Q6H  PRN


 NG


 Diarrhea  9/16/20 10:30


 10/16/20 10:29   





 


 Midodrine


  (Pro-Amatine)  10 mg  Q8HR


 GT


   8/28/20 14:00


 11/26/20 13:59  9/20/20 13:07





 


 Norepinephrine


 Bitartrate 16 mg/


 Dextrose  500 ml @ 0


 mls/hr  Q24H


 IV


   8/31/20 07:45


 9/29/20 12:59  9/4/20 08:43





 


 Ondansetron HCl


  (Zofran)  4 mg  Q6H  PRN


 IVP


 Nausea & Vomiting  8/26/20 12:00


 9/21/20 11:59   





 


 Pantoprazole


  (Protonix)  40 mg  DAILY


 IV


   8/27/20 09:00


 9/22/20 08:59  9/20/20 09:10





 


 Phenylephrine HCl


 50 mg/Dextrose  250 ml @ 0


 mls/hr  Q24H  PRN


 IV


 For hypotension  8/29/20 17:45


 9/28/20 17:44  8/31/20 01:49





 


 Polyethylene


 Glycol


  (Miralax)  17 gm  DAILYPRN  PRN


 GT


 Constipation  8/26/20 12:00


 9/21/20 11:59   





 


 Potassium Chloride


  (K-Dur)  40 meq  TWICE A  DAY


 GT


   9/20/20 12:15


 12/19/20 12:14  9/20/20 13:17





 


 Valproic Acid


  (Depakene)  500 mg  EVERY 8  HOURS


 GT


   8/26/20 14:00


 9/21/20 21:59  9/20/20 13:07











Allergies:  


Coded Allergies:  


     No Known Allergies (Unverified , 1/8/19)


ROS Limited/Unobtainable:  Yes


Subjective


60 YO F with Down's syndrome admitted with hypoxia.  Now sepsis and pneumonia.  

Cover for Int Phi-DR Hung.  ICU.  Intubated and sedated





Objective





Last Vital Signs








  Date Time  Temp Pulse Resp B/P (MAP) Pulse Ox O2 Delivery O2 Flow Rate FiO2


 


9/20/20 16:00  47      


 


9/20/20 16:00 96.8  30 143/70 (94) 98   


 


9/20/20 16:00      Mechanical Ventilator  





      Mechanical Ventilator  


 


9/20/20 16:00        85











Laboratory Tests








Test


 9/20/20


05:10 9/20/20


09:06


 


White Blood Count


 12.4 K/UL


(4.8-10.8)  H 





 


Red Blood Count


 2.83 M/UL


(4.20-5.40)  L 





 


Hemoglobin


 8.8 G/DL


(12.0-16.0)  L 





 


Hematocrit


 26.0 %


(37.0-47.0)  L 





 


Mean Corpuscular Volume 92 FL (80-99)   


 


Mean Corpuscular Hemoglobin


 31.3 PG


(27.0-31.0)  H 





 


Mean Corpuscular Hemoglobin


Concent 34.0 G/DL


(32.0-36.0) 





 


Red Cell Distribution Width


 18.8 %


(11.6-14.8)  H 





 


Platelet Count


 90 K/UL


(150-450)  L 





 


Mean Platelet Volume


 9.0 FL


(6.5-10.1) 





 


Neutrophils (%) (Auto)


 % (45.0-75.0)


 





 


Lymphocytes (%) (Auto)


 % (20.0-45.0)


 





 


Monocytes (%) (Auto)  % (1.0-10.0)   


 


Eosinophils (%) (Auto)  % (0.0-3.0)   


 


Basophils (%) (Auto)  % (0.0-2.0)   


 


Differential Total Cells


Counted 100  


 





 


Neutrophils % (Manual) 92 % (45-75)  H 


 


Lymphocytes % (Manual) 5 % (20-45)  L 


 


Monocytes % (Manual) 3 % (1-10)   


 


Eosinophils % (Manual) 0 % (0-3)   


 


Basophils % (Manual) 0 % (0-2)   


 


Band Neutrophils 0 % (0-8)   


 


Platelet Estimate Decreased  L 


 


Platelet Morphology Normal   


 


Hypochromasia 1+   


 


Anisocytosis 2+   


 


Sodium Level


 151 MMOL/L


(136-145)  H 





 


Potassium Level


 2.6 MMOL/L


(3.5-5.1)  *L 





 


Chloride Level


 115 MMOL/L


()  H 





 


Carbon Dioxide Level


 30 MMOL/L


(21-32) 





 


Anion Gap


 5 mmol/L


(5-15) 





 


Blood Urea Nitrogen


 25 mg/dL


(7-18)  H 





 


Creatinine


 0.7 MG/DL


(0.55-1.30) 





 


Estimat Glomerular Filtration


Rate > 60 mL/min


(>60) 





 


Glucose Level


 132 MG/DL


()  H 





 


Calcium Level


 7.7 MG/DL


(8.5-10.1)  L 





 


Phosphorus Level


 3.0 MG/DL


(2.5-4.9) 





 


Magnesium Level


 1.7 MG/DL


(1.8-2.4)  L 





 


Total Bilirubin


 0.6 MG/DL


(0.2-1.0) 





 


Aspartate Amino Transf


(AST/SGOT) 23 U/L (15-37)


 





 


Alanine Aminotransferase


(ALT/SGPT) 32 U/L (12-78)


 





 


Alkaline Phosphatase


 43 U/L


()  L 





 


Total Protein


 4.6 G/DL


(6.4-8.2)  L 





 


Albumin


 1.2 G/DL


(3.4-5.0)  L 





 


Globulin 3.4 g/dL   


 


Albumin/Globulin Ratio


 0.4 (1.0-2.7)


L 





 


POC Whole Blood Glucose


 


 153 MG/DL


()  H











Microbiology








 Date/Time


Source Procedure


Growth Status





 


 9/19/20 16:34


Urine,Clean Catch Urine Culture - Preliminary


NO GROWTH Resulted





 9/19/20 16:34


Sputum Gram Stain - Final Resulted


 


 9/19/20 16:34


Sputum Sputum Culture - Preliminary


NO GROWTH Resulted

















Intake and Output  


 


 9/19/20 9/20/20





 19:00 07:00


 


Intake Total 950 ml 600 ml


 


Output Total 1435 ml 650 ml


 


Balance -485 ml -50 ml


 


  


 


IV Total 300 ml 


 


Tube Feeding 650 ml 600 ml


 


Output Urine Total 1335 ml 600 ml


 


Stool Total 100 ml 50 ml








Objective


General Appearance:  WD/WN, no apparent distress, alert


EENT:  PERRL/EOMI, normal ENT inspection


Neck:  non-tender, normal alignment, supple, normal inspection


Cardiovascular:  normal peripheral pulses, normal rate, regular rhythm, no 

gallop/murmur, no JVD


Respiratory/Chest:  Mech vent; decreased breath sounds, crackles/rales, rhonchi 

- bilaterally, expiratory wheezing


Abdomen:  normal bowel sounds, non tender, soft, no organomegaly, no mass


Extremities:  normal range of motion


Neurologic:  CNs II-XII grossly normal


Skin:  normal pigmentation, warm/dry





Assessment/Plan


Problem List:  


(1) HCAP (healthcare-associated pneumonia)


Assessment & Plan:  Strep Group G.  Continue amikacin per ID=Dr Gomes.  

Pulmonary/Critical care=DR Cherry.  COVID NEG





(2) Sepsis


Assessment & Plan:  Staph haemolyticus.  Continue amikacin per ID=Dr Gomes





(3) Down's syndrome


(4) Dysphagia


Assessment & Plan:  S/P PEG





(5) Seizure disorder


Assessment & Plan:  Continue keppra and depakote





(6) Hypothyroidism


Assessment & Plan:  Continue synthroid





(7) Acute respiratory failure


Assessment & Plan:  Pulmonary = Dr Cherry; mech vent














Sanya Schultz MD               Sep 20, 2020 17:01

## 2020-09-20 NOTE — NUR
NURSE NOTES:

Remain SB on cardiac monitor all night, HR 42-55. ET Tube at 7.5 22cm at lip line. Vent 
settings: AC 26, , FiO2 85%PEEP 10. Left Upper Arm PICC intact w/drsg dry, clean and 
intact. G-tube infusing Vital AF at 50cc/hr. w/no residual noted. F/c draining well of clear 
yellow urine. Rectal tube in place. Bed locked in lowest position, side rails up x3, bed 
alarm engaged, head of bed elevated, call light within reach. No SOB or distress noted. Will 
continue POC.

## 2020-09-20 NOTE — NEPHROLOGY PROGRESS NOTE
Assessment/Plan


Problem List:  


(1) DAVID (acute kidney injury)


(2) Respiratory failure requiring intubation


(3) Down's syndrome


(4) Seizure disorder


(5) Hypothyroidism


Assessment





Acute renal failure, likely due to hypotension


Acute respiratory distress, hypoxia


Seizure disorder


Hypothyroidism


Down syndrome


Full code











Fluid challenge with IV fluids and albumin


Midodrine for BP above 100 systolic


Check TSH level


Check


Correct level


Monitor renal parameters


Urine studies


Per orders


Plan


September 20: Labs reviewed.  Serum potassium again low today.  Potassium 

supplement IV and through GT given.  Patient remains full code and is on 

ventilator.  Continue per consultants.  Continue to monitor electrolytes and 

renal parameters.


September 19: Labs reviewed.  Abnormal electrolyte addressed.  Remains full 

code.  Remains vented.


September 18: Day 27 of hospitalization.  Full code.  Labs reviewed.  Hemoglobin

down to 7.5.  Electrolyte abnormalities addressed and corrections ordered.  

Continue to monitor renal parameters.  Continue per consultants.  Start on 

Levemir for blood sugar management.  Questioning continuation of hydrocortisone?


September 17: Labs reviewed.  Potassium, phosphorus, hemoglobin, are all low.  

Potassium and phosphorus IV replacement given.  Continue to monitor electrolytes

and CBC.  Patient remains full code.


September 16: Lab reviewed.  Low phosphorus low magnesium and low potassium was 

addressed.  Hemoglobin drifting lower.  Continue per consultants.  Patient 

remains full code.


September 15: Labs reviewed.  Patient continues to be on ventilator.  D5W for 

high sodium and also potassium chloride intravenously as supplement given.  

Hemoglobin 8.4.  Continue to monitor electrolytes and renal parameters.


September 14: Labs reviewed.  Low potassium and high sodium noted.  Hemoglobin 

8.1 stable.  Aim to correct abnormal electrolyte.  Continue rest.  Will give 2 

boluses of D5W 500 cc.


September 13: Lab reviewed.  Abnormal electrolytes noted and addressed.


September 12: Labs reviewed.  Potassium supplement given.  Patient remains full 

code.  Continue per consultants.


September 11: Lab reviewed.  Electrolyte abnormalities addressed.  Continue per 

pulmonary and ID.


September 10: Lab reviewed.  Status unchanged.  Serum sodium 151 unchanged.  

Stable from renal standpoint of view.


September 9: Labs reviewed.  Status quo.  D5W 500 cc IV ordered.  Continue to 

monitor renal parameters.


September 8: Status quo.  Labs reviewed.  Overall condition unchanged.  Patient 

was transfused and hemoglobin higher.  Continue current management.  Patient 

remains full code.


September 7: Status quo.  Overall condition poor.  Very low albumin.  Edematous.

 Hypotensive.  Hemoglobin lower.  Anemia work-up ordered.  I favor transfusion 2

units of packed RBCs.  Patient remains full code.  I favor supportive care only.

 Will discuss.


September 6: Electrolyte abnormalities addressed.  Serum creatinine lower.  

Continue per current management.


September 5: Status unchanged.  Lab reviewed.  Serum potassium 2.7.  IV 

potassium chloride ordered.  Serum creatinine low at 1.6 stable.  Blood pressure

90s systolic


September 4: Status quo.  Labs reviewed.  Renal parameters stable.  Serum 

creatinine down to 1.6.  Medication list reviewed.  Continues to be on 

midodrine.  Continue per consultants.


September 3: Status quo.  Labs reviewed.  Electrolytes adjusted.  Serum 

creatinine down to 1.8.  Continue per consultants.


September 2: Status quo.  Labs reviewed.  Phosphorus supplement IV given.  Serum

creatinine 2.  Continue per consultants.


September 1: Requires less pressors.  Albumin bolus given.  1 dose of Lasix IV 

ordered as the patient severely edematous.  Patient serum albumin is very low.  

Continue per consultants.


August 31: Continues to be intubated.  Labs reviewed.  Serum creatinine 1.9 

unchanged.  Blood pressure more stable.  Off 1 of the pressors.  Continue to 

monitor renal parameters.  Continue per consultants.  Patient now on 

hydrocortisone 100 mg every 8 hours.  Will decrease IV fluid.  Normal saline 

down to 50 cc an hour.


August 30: Intubated.  Labs reviewed.  Creatinine 1.9 unchanged.  Continue same 

treatment plan.  Per consultants.  Overall poor prognosis since the patient 

remains on pressors and her pulmonary status is worsening.


August 29: Remains intubated.  Labs reviewed.  Creatinine 1.9.  Blood pressure 

systolic 90s.  Continue per consultants.


August 28: Remains intubated.  Labs reviewed.  Serum creatinine lower to 2.  

Vancomycin level lower.  Remains hypotensive on pressors.  Will increase 

midodrine to 10 mg every 8 hours.  Continue per consultants.  Continue to 

monitor renal parameters.


August 27: Patient now in ICU.  Intubated.  On pressors.  Labs reviewed.  Will 

increase midodrine.  Aim to keep blood pressure over 100 systolic.  Will give 

albumin bolus.  Will check vancomycin level which was elevated when checked 

previously on August 24.  Will monitor renal parameters.  Continue per 

consultants.





Subjective


ROS Limited/Unobtainable:  Yes





Objective


Objective





Last 24 Hour Vital Signs








  Date Time  Temp Pulse Resp B/P (MAP) Pulse Ox O2 Delivery O2 Flow Rate FiO2


 


9/20/20 11:03  40 26     85


 


9/20/20 11:00  41 26 119/61 (80) 96   


 


9/20/20 10:46    149/61    


 


9/20/20 10:00  44 26 139/70 (93) 97   


 


9/20/20 09:00  46 26 124/57 (79) 97   


 


9/20/20 08:00  45      


 


9/20/20 08:00  47      


 


9/20/20 08:00        85


 


9/20/20 08:00 97.5 46 29 149/61 (90) 97   


 


9/20/20 08:00      Mechanical Ventilator  





      Mechanical Ventilator  


 


9/20/20 07:45    122/57    


 


9/20/20 07:28  49 26     85


 


9/20/20 07:00  44 42 126/58 (80) 99   


 


9/20/20 06:00 97.2 52 29 122/57 (78) 99   


 


9/20/20 05:00  47 46 113/55 (74) 93   


 


9/20/20 04:00      Mechanical Ventilator  





      Mechanical Ventilator  


 


9/20/20 04:00  47      


 


9/20/20 04:00  46 29  99   


 


9/20/20 04:00        85


 


9/20/20 03:25  47 26     85


 


9/20/20 03:00  47 30 126/65 (85) 100   


 


9/20/20 02:00  43 26 140/59 (86) 100   


 


9/20/20 01:00  41 26 136/68 (90) 99   


 


9/20/20 00:00 98.2 42 26 152/69 (96) 99   


 


9/20/20 00:00        85


 


9/20/20 00:00      Mechanical Ventilator  





      Mechanical Ventilator  


 


9/19/20 23:00  43 26 144/79 (100) 100   


 


9/19/20 22:55  40 26     85


 


9/19/20 22:00  46 26 157/65 (95) 100   


 


9/19/20 21:00  53 26 128/57 (80) 100   


 


9/19/20 20:00      Mechanical Ventilator  





      Mechanical Ventilator  


 


9/19/20 20:00  48      


 


9/19/20 20:00 98.8 52 26 135/62 (86) 100   


 


9/19/20 20:00        85


 


9/19/20 19:31  42 26     85


 


9/19/20 19:00  43 26 145/68 (93) 100   


 


9/19/20 18:00  46 25 129/70 (89) 100   


 


9/19/20 17:00  67 31 138/84 (102) 98   


 


9/19/20 16:00      Mechanical Ventilator  





      Mechanical Ventilator  


 


9/19/20 16:00        85


 


9/19/20 16:00 98.1 49 42 137/70 (92) 98   


 


9/19/20 15:28  45      


 


9/19/20 15:20  52 26     85


 


9/19/20 15:00  53 35 131/62 (85) 95   


 


9/19/20 14:00  53 43 112/61 (78) 94   


 


9/19/20 13:00  49 48 98/53 (68) 96   

















Intake and Output  


 


 9/19/20 9/20/20





 19:00 07:00


 


Intake Total 950 ml 600 ml


 


Output Total 1435 ml 650 ml


 


Balance -485 ml -50 ml


 


  


 


IV Total 300 ml 


 


Tube Feeding 650 ml 600 ml


 


Output Urine Total 1335 ml 600 ml


 


Stool Total 100 ml 50 ml








Laboratory Tests


9/20/20 05:10: 


White Blood Count 12.4H, Red Blood Count 2.83L, Hemoglobin 8.8L, Hematocrit 

26.0L, Mean Corpuscular Volume 92, Mean Corpuscular Hemoglobin 31.3H, Mean 

Corpuscular Hemoglobin Concent 34.0, Red Cell Distribution Width 18.8H, Platelet

Count 90L, Mean Platelet Volume 9.0, Neutrophils (%) (Auto) , Lymphocytes (%) 

(Auto) , Monocytes (%) (Auto) , Eosinophils (%) (Auto) , Basophils (%) (Auto) , 

Differential Total Cells Counted 100, Neutrophils % (Manual) 92H, Lymphocytes % 

(Manual) 5L, Monocytes % (Manual) 3, Eosinophils % (Manual) 0, Basophils % 

(Manual) 0, Band Neutrophils 0, Platelet Estimate DecreasedL, Platelet 

Morphology Normal, Hypochromasia 1+, Anisocytosis 2+, Sodium Level 151H, 

Potassium Level 2.6*L, Chloride Level 115H, Carbon Dioxide Level 30, Anion Gap 

5, Blood Urea Nitrogen 25H, Creatinine 0.7, Estimat Glomerular Filtration Rate >

60, Glucose Level 132H, Calcium Level 7.7L, Phosphorus Level 3.0, Magnesium 

Level 1.7L, Total Bilirubin 0.6, Aspartate Amino Transf (AST/SGOT) 23, Alanine 

Aminotransferase (ALT/SGPT) 32, Alkaline Phosphatase 43L, Total Protein 4.6L, 

Albumin 1.2L, Globulin 3.4, Albumin/Globulin Ratio 0.4L


9/20/20 09:06: POC Whole Blood Glucose 153H


Height (Feet):  5


Height (Inches):  3.00


Weight (Pounds):  172


General Appearance:  no apparent distress


EENT:  other - Remains intubated on ventilator


Cardiovascular:  tachycardia


Respiratory/Chest:  decreased breath sounds


Abdomen:  distended











Lam Nino MD            Sep 20, 2020 12:18

## 2020-09-20 NOTE — NUR
NURSE NOTES:

received report from tammi mccurdy pt orally intubated -vent o2sat 100% hr 48 TOLERATING TUBE 
FEEDING NO RESIDUAL URINARY OUTPUT 50CC/HR  IV SITE PATENT INFUSING TKO  REPOSITION AND 
SUCTION

## 2020-09-20 NOTE — NUR
NURSE NOTES:

Rectal tube came out ,bed bath given, liquid stools for OB collected, and sent to lab 
,stools very aromatic in odor. What Type Of Note Output Would You Prefer (Optional)?: Standard Output What Is The Reason For Today's Visit?: Full Body Skin Examination with No Concerns What Is The Reason For Today's Visit? (Being Monitored For X): concerning skin lesions on an annual basis How Severe Are Your Spot(S)?: mild

## 2020-09-21 VITALS — DIASTOLIC BLOOD PRESSURE: 64 MMHG | SYSTOLIC BLOOD PRESSURE: 146 MMHG

## 2020-09-21 VITALS — SYSTOLIC BLOOD PRESSURE: 117 MMHG | DIASTOLIC BLOOD PRESSURE: 93 MMHG

## 2020-09-21 VITALS — SYSTOLIC BLOOD PRESSURE: 95 MMHG | DIASTOLIC BLOOD PRESSURE: 55 MMHG

## 2020-09-21 VITALS — SYSTOLIC BLOOD PRESSURE: 92 MMHG | DIASTOLIC BLOOD PRESSURE: 43 MMHG

## 2020-09-21 VITALS — SYSTOLIC BLOOD PRESSURE: 122 MMHG | DIASTOLIC BLOOD PRESSURE: 60 MMHG

## 2020-09-21 VITALS — DIASTOLIC BLOOD PRESSURE: 72 MMHG | SYSTOLIC BLOOD PRESSURE: 165 MMHG

## 2020-09-21 VITALS — DIASTOLIC BLOOD PRESSURE: 55 MMHG | SYSTOLIC BLOOD PRESSURE: 131 MMHG

## 2020-09-21 VITALS — SYSTOLIC BLOOD PRESSURE: 119 MMHG | DIASTOLIC BLOOD PRESSURE: 91 MMHG

## 2020-09-21 VITALS — DIASTOLIC BLOOD PRESSURE: 57 MMHG | SYSTOLIC BLOOD PRESSURE: 99 MMHG

## 2020-09-21 VITALS — SYSTOLIC BLOOD PRESSURE: 78 MMHG | DIASTOLIC BLOOD PRESSURE: 45 MMHG

## 2020-09-21 VITALS — DIASTOLIC BLOOD PRESSURE: 68 MMHG | SYSTOLIC BLOOD PRESSURE: 132 MMHG

## 2020-09-21 VITALS — SYSTOLIC BLOOD PRESSURE: 142 MMHG | DIASTOLIC BLOOD PRESSURE: 84 MMHG

## 2020-09-21 VITALS — DIASTOLIC BLOOD PRESSURE: 79 MMHG | SYSTOLIC BLOOD PRESSURE: 126 MMHG

## 2020-09-21 VITALS — SYSTOLIC BLOOD PRESSURE: 125 MMHG | DIASTOLIC BLOOD PRESSURE: 67 MMHG

## 2020-09-21 VITALS — DIASTOLIC BLOOD PRESSURE: 38 MMHG | SYSTOLIC BLOOD PRESSURE: 83 MMHG

## 2020-09-21 VITALS — DIASTOLIC BLOOD PRESSURE: 55 MMHG | SYSTOLIC BLOOD PRESSURE: 108 MMHG

## 2020-09-21 VITALS — SYSTOLIC BLOOD PRESSURE: 133 MMHG | DIASTOLIC BLOOD PRESSURE: 54 MMHG

## 2020-09-21 VITALS — DIASTOLIC BLOOD PRESSURE: 55 MMHG | SYSTOLIC BLOOD PRESSURE: 125 MMHG

## 2020-09-21 VITALS — DIASTOLIC BLOOD PRESSURE: 83 MMHG | SYSTOLIC BLOOD PRESSURE: 142 MMHG

## 2020-09-21 VITALS — SYSTOLIC BLOOD PRESSURE: 134 MMHG | DIASTOLIC BLOOD PRESSURE: 50 MMHG

## 2020-09-21 VITALS — DIASTOLIC BLOOD PRESSURE: 64 MMHG | SYSTOLIC BLOOD PRESSURE: 140 MMHG

## 2020-09-21 VITALS — SYSTOLIC BLOOD PRESSURE: 102 MMHG | DIASTOLIC BLOOD PRESSURE: 71 MMHG

## 2020-09-21 VITALS — DIASTOLIC BLOOD PRESSURE: 36 MMHG | SYSTOLIC BLOOD PRESSURE: 81 MMHG

## 2020-09-21 VITALS — SYSTOLIC BLOOD PRESSURE: 118 MMHG | DIASTOLIC BLOOD PRESSURE: 66 MMHG

## 2020-09-21 VITALS — SYSTOLIC BLOOD PRESSURE: 126 MMHG | DIASTOLIC BLOOD PRESSURE: 61 MMHG

## 2020-09-21 VITALS — SYSTOLIC BLOOD PRESSURE: 124 MMHG | DIASTOLIC BLOOD PRESSURE: 66 MMHG

## 2020-09-21 LAB
ADD MANUAL DIFF: YES
ALBUMIN SERPL-MCNC: 1.2 G/DL (ref 3.4–5)
ALBUMIN/GLOB SERPL: 0.4 {RATIO} (ref 1–2.7)
ALP SERPL-CCNC: 46 U/L (ref 46–116)
ALT SERPL-CCNC: 25 U/L (ref 12–78)
ANION GAP SERPL CALC-SCNC: 9 MMOL/L (ref 5–15)
AST SERPL-CCNC: 19 U/L (ref 15–37)
BILIRUB SERPL-MCNC: 0.5 MG/DL (ref 0.2–1)
BUN SERPL-MCNC: 26 MG/DL (ref 7–18)
CALCIUM SERPL-MCNC: 7.6 MG/DL (ref 8.5–10.1)
CHLORIDE SERPL-SCNC: 115 MMOL/L (ref 98–107)
CO2 SERPL-SCNC: 27 MMOL/L (ref 21–32)
CREAT SERPL-MCNC: 0.8 MG/DL (ref 0.55–1.3)
ERYTHROCYTE [DISTWIDTH] IN BLOOD BY AUTOMATED COUNT: 19.4 % (ref 11.6–14.8)
GLOBULIN SER-MCNC: 3.3 G/DL
HCT VFR BLD CALC: 26.2 % (ref 37–47)
HGB BLD-MCNC: 8.7 G/DL (ref 12–16)
MCV RBC AUTO: 93 FL (ref 80–99)
PHOSPHATE SERPL-MCNC: 2.1 MG/DL (ref 2.5–4.9)
PLATELET # BLD: 89 K/UL (ref 150–450)
POTASSIUM SERPL-SCNC: 3.5 MMOL/L (ref 3.5–5.1)
RBC # BLD AUTO: 2.8 M/UL (ref 4.2–5.4)
SODIUM SERPL-SCNC: 150 MMOL/L (ref 136–145)
WBC # BLD AUTO: 12.4 K/UL (ref 4.8–10.8)

## 2020-09-21 PROCEDURE — 0W9930Z DRAINAGE OF RIGHT PLEURAL CAVITY WITH DRAINAGE DEVICE, PERCUTANEOUS APPROACH: ICD-10-PCS

## 2020-09-21 PROCEDURE — 0B21XEZ CHANGE ENDOTRACHEAL AIRWAY IN TRACHEA, EXTERNAL APPROACH: ICD-10-PCS

## 2020-09-21 RX ADMIN — HYDROCORTISONE SODIUM SUCCINATE SCH MG: 100 INJECTION, POWDER, FOR SOLUTION INTRAMUSCULAR; INTRAVENOUS at 05:20

## 2020-09-21 RX ADMIN — PANTOPRAZOLE SODIUM SCH MG: 40 INJECTION, POWDER, FOR SOLUTION INTRAVENOUS at 08:56

## 2020-09-21 RX ADMIN — MIDODRINE HYDROCHLORIDE SCH MG: 10 TABLET ORAL at 13:39

## 2020-09-21 RX ADMIN — VALPROIC ACID SCH MG: 250 SOLUTION ORAL at 05:20

## 2020-09-21 RX ADMIN — MIDODRINE HYDROCHLORIDE SCH MG: 10 TABLET ORAL at 21:48

## 2020-09-21 RX ADMIN — CHLORHEXIDINE GLUCONATE SCH APPLIC: 213 SOLUTION TOPICAL at 20:00

## 2020-09-21 RX ADMIN — HYDROCORTISONE SODIUM SUCCINATE SCH MG: 100 INJECTION, POWDER, FOR SOLUTION INTRAMUSCULAR; INTRAVENOUS at 13:39

## 2020-09-21 RX ADMIN — VALPROIC ACID SCH MG: 250 SOLUTION ORAL at 13:39

## 2020-09-21 RX ADMIN — INSULIN ASPART SCH UNITS: 100 INJECTION, SOLUTION INTRAVENOUS; SUBCUTANEOUS at 17:30

## 2020-09-21 RX ADMIN — SODIUM CHLORIDE SCH MLS/HR: 900 INJECTION, SOLUTION INTRAVENOUS at 07:45

## 2020-09-21 RX ADMIN — INSULIN DETEMIR SCH UNITS: 100 INJECTION, SOLUTION SUBCUTANEOUS at 09:05

## 2020-09-21 RX ADMIN — INSULIN ASPART SCH UNITS: 100 INJECTION, SOLUTION INTRAVENOUS; SUBCUTANEOUS at 05:22

## 2020-09-21 RX ADMIN — MIDODRINE HYDROCHLORIDE SCH MG: 10 TABLET ORAL at 05:20

## 2020-09-21 RX ADMIN — HYDROCORTISONE SODIUM SUCCINATE SCH MG: 100 INJECTION, POWDER, FOR SOLUTION INTRAMUSCULAR; INTRAVENOUS at 21:48

## 2020-09-21 RX ADMIN — INSULIN ASPART SCH UNITS: 100 INJECTION, SOLUTION INTRAVENOUS; SUBCUTANEOUS at 12:42

## 2020-09-21 RX ADMIN — INSULIN ASPART SCH UNITS: 100 INJECTION, SOLUTION INTRAVENOUS; SUBCUTANEOUS at 00:27

## 2020-09-21 RX ADMIN — INSULIN DETEMIR SCH UNITS: 100 INJECTION, SOLUTION SUBCUTANEOUS at 20:48

## 2020-09-21 RX ADMIN — DOPAMINE HYDROCHLORIDE IN DEXTROSE SCH MLS/HR: 1.6 INJECTION, SOLUTION INTRAVENOUS at 11:15

## 2020-09-21 NOTE — NEPHROLOGY PROGRESS NOTE
Assessment/Plan


Problem List:  


(1) DAVID (acute kidney injury)


(2) Respiratory failure requiring intubation


(3) Down's syndrome


(4) Seizure disorder


(5) Hypothyroidism


Assessment





Acute renal failure, likely due to hypotension


Acute respiratory distress, hypoxia


Seizure disorder


Hypothyroidism


Down syndrome


Full code











Fluid challenge with IV fluids and albumin


Midodrine for BP above 100 systolic


Check TSH level


Check


Correct level


Monitor renal parameters


Urine studies


Per orders


Plan


September 21: Labs reviewed.  Electrolyte imbalances addressed and supplemented.

 Remains full code and on ventilator.  Continue to monitor renal parameters.  

Continue per consultants.


September 20: Labs reviewed.  Serum potassium again low today.  Potassium 

supplement IV and through GT given.  Patient remains full code and is on 

ventilator.  Continue per consultants.  Continue to monitor electrolytes and 

renal parameters.


September 19: Labs reviewed.  Abnormal electrolyte addressed.  Remains full 

code.  Remains vented.


September 18: Day 27 of hospitalization.  Full code.  Labs reviewed.  Hemoglobin

down to 7.5.  Electrolyte abnormalities addressed and corrections ordered.  

Continue to monitor renal parameters.  Continue per consultants.  Start on 

Levemir for blood sugar management.  Questioning continuation of hydrocortisone?


September 17: Labs reviewed.  Potassium, phosphorus, hemoglobin, are all low.  

Potassium and phosphorus IV replacement given.  Continue to monitor electrolytes

and CBC.  Patient remains full code.


September 16: Lab reviewed.  Low phosphorus low magnesium and low potassium was 

addressed.  Hemoglobin drifting lower.  Continue per consultants.  Patient 

remains full code.


September 15: Labs reviewed.  Patient continues to be on ventilator.  D5W for 

high sodium and also potassium chloride intravenously as supplement given.  

Hemoglobin 8.4.  Continue to monitor electrolytes and renal parameters.


September 14: Labs reviewed.  Low potassium and high sodium noted.  Hemoglobin 

8.1 stable.  Aim to correct abnormal electrolyte.  Continue rest.  Will give 2 

boluses of D5W 500 cc.


September 13: Lab reviewed.  Abnormal electrolytes noted and addressed.


September 12: Labs reviewed.  Potassium supplement given.  Patient remains full 

code.  Continue per consultants.


September 11: Lab reviewed.  Electrolyte abnormalities addressed.  Continue per 

pulmonary and ID.


September 10: Lab reviewed.  Status unchanged.  Serum sodium 151 unchanged.  

Stable from renal standpoint of view.


September 9: Labs reviewed.  Status quo.  D5W 500 cc IV ordered.  Continue to 

monitor renal parameters.


September 8: Status quo.  Labs reviewed.  Overall condition unchanged.  Patient 

was transfused and hemoglobin higher.  Continue current management.  Patient 

remains full code.


September 7: Status quo.  Overall condition poor.  Very low albumin.  Edematous.

 Hypotensive.  Hemoglobin lower.  Anemia work-up ordered.  I favor transfusion 2

units of packed RBCs.  Patient remains full code.  I favor supportive care only.

 Will discuss.


September 6: Electrolyte abnormalities addressed.  Serum creatinine lower.  Cont

inue per current management.


September 5: Status unchanged.  Lab reviewed.  Serum potassium 2.7.  IV 

potassium chloride ordered.  Serum creatinine low at 1.6 stable.  Blood pressure

90s systolic


September 4: Status quo.  Labs reviewed.  Renal parameters stable.  Serum 

creatinine down to 1.6.  Medication list reviewed.  Continues to be on 

midodrine.  Continue per consultants.


September 3: Status quo.  Labs reviewed.  Electrolytes adjusted.  Serum 

creatinine down to 1.8.  Continue per consultants.


September 2: Status quo.  Labs reviewed.  Phosphorus supplement IV given.  Serum

creatinine 2.  Continue per consultants.


September 1: Requires less pressors.  Albumin bolus given.  1 dose of Lasix IV 

ordered as the patient severely edematous.  Patient serum albumin is very low.  

Continue per consultants.


August 31: Continues to be intubated.  Labs reviewed.  Serum creatinine 1.9 

unchanged.  Blood pressure more stable.  Off 1 of the pressors.  Continue to 

monitor renal parameters.  Continue per consultants.  Patient now on 

hydrocortisone 100 mg every 8 hours.  Will decrease IV fluid.  Normal saline 

down to 50 cc an hour.


August 30: Intubated.  Labs reviewed.  Creatinine 1.9 unchanged.  Continue same 

treatment plan.  Per consultants.  Overall poor prognosis since the patient 

remains on pressors and her pulmonary status is worsening.


August 29: Remains intubated.  Labs reviewed.  Creatinine 1.9.  Blood pressure 

systolic 90s.  Continue per consultants.


August 28: Remains intubated.  Labs reviewed.  Serum creatinine lower to 2.  

Vancomycin level lower.  Remains hypotensive on pressors.  Will increase 

midodrine to 10 mg every 8 hours.  Continue per consultants.  Continue to 

monitor renal parameters.


August 27: Patient now in ICU.  Intubated.  On pressors.  Labs reviewed.  Will 

increase midodrine.  Aim to keep blood pressure over 100 systolic.  Will give 

albumin bolus.  Will check vancomycin level which was elevated when checked 

previously on August 24.  Will monitor renal parameters.  Continue per 

consultants.





Subjective


ROS Limited/Unobtainable:  Yes





Objective


Objective





Last 24 Hour Vital Signs








  Date Time  Temp Pulse Resp B/P (MAP) Pulse Ox O2 Delivery O2 Flow Rate FiO2


 


9/21/20 12:00  71      


 


9/21/20 12:00        100


 


9/21/20 12:00 98.6 76 21 95/55 (68) 90   


 


9/21/20 11:15    92/43    


 


9/21/20 11:00  72 24 92/43 (59) 99   


 


9/21/20 10:30  69 26 81/36 (51) 97   


 


9/21/20 10:15  71 25 78/45 (56) 98   


 


9/21/20 10:00  70 24 83/38 (53) 97   


 


9/21/20 09:00  76 40 102/71 (81) 91   


 


9/21/20 08:30        100


 


9/21/20 08:00  65      


 


9/21/20 08:00 98.4 76 40 117/93 (101) 86   


 


9/21/20 08:00        70


 


9/21/20 07:45    142/84    


 


9/21/20 07:29  74 26     100


 


9/21/20 07:00  53 26 142/84 (103) 94   


 


9/21/20 07:00  53 26 142/84 (103) 94   


 


9/21/20 06:00  55 24 146/64 (91) 98   


 


9/21/20 05:00  58 24 118/66 (83) 98   


 


9/21/20 04:00      Mechanical Ventilator  





      Mechanical Ventilator  


 


9/21/20 04:00 98.5 56 25 119/91 (100) 99   


 


9/21/20 04:00        85


 


9/21/20 04:00  56      


 


9/21/20 03:00  46 26 142/83 (102) 99   


 


9/21/20 02:59  46 26     70


 


9/21/20 02:00  42 26 126/61 (82) 100   


 


9/21/20 01:00  54 26 131/55 (80) 97   


 


9/21/20 00:00  65 45 99/57 (71) 99   


 


9/21/20 00:00      Mechanical Ventilator  





      Mechanical Ventilator  


 


9/20/20 23:00  51 25 140/73 (95) 100   


 


9/20/20 22:57  51 26     70


 


9/20/20 22:00  44 26 119/63 (81) 100   


 


9/20/20 21:00  56 21 127/58 (81) 100   


 


9/20/20 20:00        85


 


9/20/20 20:00      Mechanical Ventilator  





      Mechanical Ventilator  


 


9/20/20 20:00 97.8 49 23 145/65 (91) 100   


 


9/20/20 20:00  48      


 


9/20/20 19:08  44 26     85


 


9/20/20 19:03  42 26 135/60 (85) 100   


 


9/20/20 18:00  45 23 116/54 (74) 100   


 


9/20/20 18:00  51 28 89/56 (67) 100   


 


9/20/20 17:00  48  141/69 (93) 99   


 


9/20/20 17:00  48 26 141/69 (93) 100   


 


9/20/20 16:00  47      


 


9/20/20 16:00 96.8 44 30 143/70 (94) 98   


 


9/20/20 16:00      Mechanical Ventilator  





      Mechanical Ventilator  


 


9/20/20 16:00  45      


 


9/20/20 16:00        85


 


9/20/20 15:00  48 20 138/69 (92) 97   


 


9/20/20 14:56  64 26     85


 


9/20/20 14:00  44 20 117/58 (77) 97   


 


9/20/20 13:00  51 20 117/56 (76) 97   

















Intake and Output  


 


 9/20/20 9/21/20





 19:00 07:00


 


Intake Total 830 ml 1050 ml


 


Output Total 925 ml 1040 ml


 


Balance -95 ml 10 ml


 


  


 


Free Water 200 ml 400 ml


 


Tube Feeding 450 ml 650 ml


 


Other 180 ml 


 


Output Urine Total 925 ml 1040 ml


 


# Bowel Movements 50 100








Laboratory Tests


9/20/20 18:00: Stool Occult Blood [Pending]


9/21/20 06:14: 


White Blood Count 12.4H, Red Blood Count 2.80L, Hemoglobin 8.7L, Hematocrit 

26.2L, Mean Corpuscular Volume 93, Mean Corpuscular Hemoglobin 31.2H, Mean 

Corpuscular Hemoglobin Concent 33.5, Red Cell Distribution Width 19.4H, Platelet

 Count 89L, Mean Platelet Volume 9.5, Neutrophils (%) (Auto) , Lymphocytes (%) 

(Auto) , Monocytes (%) (Auto) , Eosinophils (%) (Auto) , Basophils (%) (Auto) , 

Differential Total Cells Counted 100, Neutrophils % (Manual) 91H, Lymphocytes % 

(Manual) 4L, Monocytes % (Manual) 5, Eosinophils % (Manual) 0, Basophils % 

(Manual) 0, Band Neutrophils 0, Platelet Estimate DecreasedL, Platelet 

Morphology Normal, Hypochromasia 1+, Anisocytosis 2+, Sodium Level 150H, 

Potassium Level 3.5, Chloride Level 115H, Carbon Dioxide Level 27, Anion Gap 9, 

Blood Urea Nitrogen 26H, Creatinine 0.8, Estimat Glomerular Filtration Rate > 

60, Glucose Level 201H, Calcium Level 7.6L, Phosphorus Level 2.1L, Magnesium 

Level 1.7L, Total Bilirubin 0.5, Aspartate Amino Transf (AST/SGOT) 19, Alanine 

Aminotransferase (ALT/SGPT) 25, Alkaline Phosphatase 46, C-Reactive Protein, 

Quantitative 3.8H, Pro-B-Type Natriuretic Peptide 5966H, Total Protein 4.5L, 

Albumin 1.2L, Globulin 3.3, Albumin/Globulin Ratio 0.4L


9/21/20 08:19: 


Arterial Blood pH 7.443, Arterial Blood Partial Pressure CO2 30.3L, Arterial 

Blood Partial Pressure O2 53.1L, Arterial Blood HCO3 20.3L, Arterial Blood 

Oxygen Saturation 86.4*L, Arterial Blood Base Excess -3.0L, Cole Test N/a


Height (Feet):  5


Height (Inches):  3.00


Weight (Pounds):  172


General Appearance:  no apparent distress


EENT:  other - On ventilator


Cardiovascular:  normal rate


Respiratory/Chest:  decreased breath sounds


Abdomen:  distended











Lam Nino MD            Sep 21, 2020 12:46

## 2020-09-21 NOTE — NUR
NURSE NOTES:

CXR results received by Dr Oliveira- 

OK to unclamp and hook to low continuous wall suction at 20 cm H20 per order. 

Pt in no distress. SpO2 % at this time. 

-------------------------------------------------------------------------------

Addendum: 09/21/20 at 1720 by Rylie Smith RN

-------------------------------------------------------------------------------

NURSE NOTES:

Late entry: Thoravent removed per Dr Oliveira - area covered with gauze - no gross bleeding 
observed.

## 2020-09-21 NOTE — DIAGNOSTIC IMAGING REPORT
Indication: Shortness of breath, pneumothorax demonstrated on recent chest

radiograph, status post chest tube placement

 

Technique: One view of the chest

 

Comparison: 2 hours earlier

 

Findings: Interim placement of a large-bore chest tube. Interim reexpansion of the

right lung. No definite residual pneumothorax demonstrated. There is some

subcutaneous emphysema in the right chest wall. There is diffuse right lung opacity,

which is less extensive than the extensive opacity in the left lung. Stable

satisfactory position of endotracheal tube, left arm PICC. Small bore chest catheter

remains in place.

 

Impression: Interim reexpansion of previously demonstrated right pneumothorax, status

post large bore chest tube placement.

 

Other findings as noted

 

Findings previously discussed by phone with Dr. Oliveira in the emergency room

## 2020-09-21 NOTE — GENERAL PROGRESS NOTE
Subjective


ROS Limited/Unobtainable:  No


Allergies:  


Coded Allergies:  


     No Known Allergies (Unverified , 1/8/19)





Objective





Last 24 Hour Vital Signs








  Date Time  Temp Pulse Resp B/P (MAP) Pulse Ox O2 Delivery O2 Flow Rate FiO2


 


9/21/20 07:45    142/84    


 


9/21/20 07:29  74 26     100


 


9/21/20 07:00  53 26 142/84 (103) 94   


 


9/21/20 07:00  53 26 142/84 (103) 94   


 


9/21/20 06:00  55 24 146/64 (91) 98   


 


9/21/20 05:00  58 24 118/66 (83) 98   


 


9/21/20 04:00      Mechanical Ventilator  





      Mechanical Ventilator  


 


9/21/20 04:00 98.5 56 25 119/91 (100) 99   


 


9/21/20 04:00        85


 


9/21/20 04:00  56      


 


9/21/20 03:00  46 26 142/83 (102) 99   


 


9/21/20 02:59  46 26     70


 


9/21/20 02:00  42 26 126/61 (82) 100   


 


9/21/20 01:00  54 26 131/55 (80) 97   


 


9/21/20 00:00  65 45 99/57 (71) 99   


 


9/21/20 00:00      Mechanical Ventilator  





      Mechanical Ventilator  


 


9/20/20 23:00  51 25 140/73 (95) 100   


 


9/20/20 22:57  51 26     70


 


9/20/20 22:00  44 26 119/63 (81) 100   


 


9/20/20 21:00  56 21 127/58 (81) 100   


 


9/20/20 20:00        85


 


9/20/20 20:00      Mechanical Ventilator  





      Mechanical Ventilator  


 


9/20/20 20:00 97.8 49 23 145/65 (91) 100   


 


9/20/20 20:00  48      


 


9/20/20 19:08  44 26     85


 


9/20/20 19:03  42 26 135/60 (85) 100   


 


9/20/20 18:00  45 23 116/54 (74) 100   


 


9/20/20 18:00  51 28 89/56 (67) 100   


 


9/20/20 17:00  48  141/69 (93) 99   


 


9/20/20 17:00  48 26 141/69 (93) 100   


 


9/20/20 16:00  47      


 


9/20/20 16:00 96.8 44 30 143/70 (94) 98   


 


9/20/20 16:00      Mechanical Ventilator  





      Mechanical Ventilator  


 


9/20/20 16:00  45      


 


9/20/20 16:00        85


 


9/20/20 15:00  48 20 138/69 (92) 97   


 


9/20/20 14:56  64 26     85


 


9/20/20 14:00  44 20 117/58 (77) 97   


 


9/20/20 13:00  51 20 117/56 (76) 97   


 


9/20/20 12:00        85


 


9/20/20 12:00  47      


 


9/20/20 12:00 98.2 48 18 123/59 (80) 98   


 


9/20/20 12:00      Mechanical Ventilator  





      Mechanical Ventilator  


 


9/20/20 11:03  40 26     85


 


9/20/20 11:00  41 26 119/61 (80) 96   


 


9/20/20 10:46    149/61    

















Intake and Output  


 


 9/20/20 9/21/20





 19:00 07:00


 


Intake Total 830 ml 1050 ml


 


Output Total 925 ml 1040 ml


 


Balance -95 ml 10 ml


 


  


 


Free Water 200 ml 400 ml


 


Tube Feeding 450 ml 650 ml


 


Other 180 ml 


 


Output Urine Total 925 ml 1040 ml


 


# Bowel Movements 50 100








Laboratory Tests


9/20/20 18:00: Stool Occult Blood [Pending]


9/21/20 06:14: 


White Blood Count 12.4H, Red Blood Count 2.80L, Hemoglobin 8.7L, Hematocrit 

26.2L, Mean Corpuscular Volume 93, Mean Corpuscular Hemoglobin 31.2H, Mean 

Corpuscular Hemoglobin Concent 33.5, Red Cell Distribution Width 19.4H, Platelet

 Count 89L, Mean Platelet Volume 9.5, Neutrophils (%) (Auto) , Lymphocytes (%) 

(Auto) , Monocytes (%) (Auto) , Eosinophils (%) (Auto) , Basophils (%) (Auto) , 

Differential Total Cells Counted 100, Neutrophils % (Manual) 91H, Lymphocytes % 

(Manual) 4L, Monocytes % (Manual) 5, Eosinophils % (Manual) 0, Basophils % 

(Manual) 0, Band Neutrophils 0, Platelet Estimate DecreasedL, Platelet 

Morphology Normal, Hypochromasia 1+, Anisocytosis 2+, Sodium Level 150H, 

Potassium Level 3.5, Chloride Level 115H, Carbon Dioxide Level 27, Anion Gap 9, 

Blood Urea Nitrogen 26H, Creatinine 0.8, Estimat Glomerular Filtration Rate > 

60, Glucose Level 201H, Calcium Level 7.6L, Phosphorus Level 2.1L, Magnesium 

Level 1.7L, Total Bilirubin 0.5, Aspartate Amino Transf (AST/SGOT) 19, Alanine 

Aminotransferase (ALT/SGPT) 25, Alkaline Phosphatase 46, C-Reactive Protein, 

Quantitative 3.8H, Pro-B-Type Natriuretic Peptide 5966H, Total Protein 4.5L, 

Albumin 1.2L, Globulin 3.3, Albumin/Globulin Ratio 0.4L


9/21/20 08:19: 


Arterial Blood pH 7.443, Arterial Blood Partial Pressure CO2 30.3L, Arterial 

Blood Partial Pressure O2 53.1L, Arterial Blood HCO3 20.3L, Arterial Blood 

Oxygen Saturation 86.4*L, Arterial Blood Base Excess -3.0L, Cole Test N/a


Height (Feet):  5


Height (Inches):  3.00


Weight (Pounds):  172


General Appearance:  no apparent distress


EENT:  normal ENT inspection


Neck:  supple


Cardiovascular:  normal rate


Respiratory/Chest:  decreased breath sounds


Abdomen:  normal bowel sounds, non tender, soft


Extremities:  swelling





Assessment/Plan


Status:  stable, not improved, unchanged


Assessment/Plan:


1. History of Down syndrome.


2. Dysphagia with G-tube.


3. Seizure disorder.


4. Hypothyroidism.


5. DAVID.


6. Pneumonia.


7. Sepsis.








fu H&H


prn blood transfusion to keep HGB above 7


ppi


GTF


hold GI procedures for now


stool ob + 1/2











Ted Nagy MD             Sep 21, 2020 10:40

## 2020-09-21 NOTE — DIAGNOSTIC IMAGING REPORT
Indication: Endotracheal tube placement, status post CODE BLUE

 

Technique: One view of the chest

 

Comparison: 9/17/2020

 

Findings: Interim development of a massive right pneumothorax, with complete collapse

of the right lung and shift of the mediastinum to the left, volume loss of the left

lung. Satisfactory position of endotracheal tube, tip projecting approximately 8 cm

above the lisandro but well below the thoracic inlet. Left arm PICC is again

demonstrated.

 

Impression: Interim development of massive tension pneumothorax on the right. ICU

charge nurse Mayra notified of this critical value finding at approximately 11:30 AM.

Dr. Cherry also notified

 

Satisfactory endotracheal intubation

## 2020-09-21 NOTE — DIAGNOSTIC IMAGING REPORT
Indication: Status post chest tube for pneumothorax

 

Technique: One view of the chest

 

Comparison: 5 1/2 hours earlier

 

Findings: Interim placement of a smallbore chest vent catheter in the right first

intercostal space. The right lung has partially reexpanded and the mediastinal shift

has resolved, but there is still a sizable pneumothorax. The catheter tip parallels

the inside of the chest wall but there is no adjacent lung to appose it to the chest

wall, so suspect that the catheter is just extrapleural rather than intrapleural.

Stable satisfactory position of endotracheal tube. Extensive left lung infiltrate and

left pleural fluid persists.

 

Impression: Improved but still large right pneumothorax, with resolution of the

tension component, status post chest vent catheter placement. Position of the

catheter tip, however, is suspicious for it being in the extrapleural space rather

than the pleural space.

 

Other findings as noted

 

Findings discussed by phone with Dr. Oliveira at the time of interpretation

## 2020-09-21 NOTE — PULMONOLGY CRITICAL CARE NOTE
Critical Care - Asmt/Plan


Problems:  


(1) Acute respiratory failure


(2) Bacteremia


(3) Pneumonia


(4) Sepsis


(5) HCAP (healthcare-associated pneumonia)


(6) Seizure disorder


(7) Down's syndrome


Respiratory:  monitor respiratory rate, adjust FIO2, CXR


Cardiac:  continue pressors, continue to monitor HR/BP


Renal:  F/U I&O, keep IV fluid, check electrolytes


Infectious Disease:  check cultures, continue antibiotics


Gastrointestinal:  continue feedings/current rate


Endocrine:  monitor blood sugar


Hematologic:  monitor H/H, transfuse if hgb<8.5


Neurologic:  PRN Ativan, keep patient comfortable


Affect:  PRN ativan


Prophylaxis:  Protonix


Time Spent (Minutes):  40


Notes Reviewed:  internist, cardio


Discussed with:  nurses, consultants, 





Critical Care - Objective





Last 24 Hour Vital Signs








  Date Time  Temp Pulse Resp B/P (MAP) Pulse Ox O2 Delivery O2 Flow Rate FiO2


 


9/21/20 07:45    142/84    


 


9/21/20 07:29  74 26     100


 


9/21/20 07:00  53 26 142/84 (103) 94   


 


9/21/20 07:00  53 26 142/84 (103) 94   


 


9/21/20 06:00  55 24 146/64 (91) 98   


 


9/21/20 05:00  58 24 118/66 (83) 98   


 


9/21/20 04:00      Mechanical Ventilator  





      Mechanical Ventilator  


 


9/21/20 04:00 98.5 56 25 119/91 (100) 99   


 


9/21/20 04:00        85


 


9/21/20 04:00  56      


 


9/21/20 03:00  46 26 142/83 (102) 99   


 


9/21/20 02:59  46 26     70


 


9/21/20 02:00  42 26 126/61 (82) 100   


 


9/21/20 01:00  54 26 131/55 (80) 97   


 


9/21/20 00:00  65 45 99/57 (71) 99   


 


9/21/20 00:00      Mechanical Ventilator  





      Mechanical Ventilator  


 


9/20/20 23:00  51 25 140/73 (95) 100   


 


9/20/20 22:57  51 26     70


 


9/20/20 22:00  44 26 119/63 (81) 100   


 


9/20/20 21:00  56 21 127/58 (81) 100   


 


9/20/20 20:00        85


 


9/20/20 20:00      Mechanical Ventilator  





      Mechanical Ventilator  


 


9/20/20 20:00 97.8 49 23 145/65 (91) 100   


 


9/20/20 20:00  48      


 


9/20/20 19:08  44 26     85


 


9/20/20 19:03  42 26 135/60 (85) 100   


 


9/20/20 18:00  45 23 116/54 (74) 100   


 


9/20/20 18:00  51 28 89/56 (67) 100   


 


9/20/20 17:00  48  141/69 (93) 99   


 


9/20/20 17:00  48 26 141/69 (93) 100   


 


9/20/20 16:00  47      


 


9/20/20 16:00 96.8 44 30 143/70 (94) 98   


 


9/20/20 16:00      Mechanical Ventilator  





      Mechanical Ventilator  


 


9/20/20 16:00  45      


 


9/20/20 16:00        85


 


9/20/20 15:00  48 20 138/69 (92) 97   


 


9/20/20 14:56  64 26     85


 


9/20/20 14:00  44 20 117/58 (77) 97   


 


9/20/20 13:00  51 20 117/56 (76) 97   


 


9/20/20 12:00        85


 


9/20/20 12:00  47      


 


9/20/20 12:00 98.2 48 18 123/59 (80) 98   


 


9/20/20 12:00      Mechanical Ventilator  





      Mechanical Ventilator  


 


9/20/20 11:03  40 26     85


 


9/20/20 11:00  41 26 119/61 (80) 96   


 


9/20/20 10:46    149/61    








Status:  awake


Condition:  critical


HEENT:  atraumatic


Neck:  full ROM


Lungs:  clear


Heart:  HR/BP stable, HR/BP unstable


Abdomen:  soft, non-tender


Extremities:  no C/C/E, edema


Decubiti:  location


Micro:





Microbiology








 Date/Time


Source Procedure


Growth Status





 


 9/19/20 16:34


Urine,Clean Catch Urine Culture - Final


NO GROWTH AFTER 48 HOURS Complete





 9/19/20 16:34


Sputum Gram Stain - Final Resulted


 


 9/19/20 16:34 Sputum Culture - Preliminary


YEAST Resulted








Accucheck:  200





Critical Care - Subjective


ROS Limited/Unobtainable:  Yes


Interval Events:


ET tube was changed this morning by ER physician. The balloon was ruptured.


Condition:  critical


EKG Rhythm:  Sinus Rhythm


FI02:  100


Vent Support Breath Rate:  26


Vent Support Mode:  AC


Vent Tidal Volume:  500


Sputum Amount:  Moderate


PEEP:  10.0


PIP:  51


Tube Feeding Amount:  50


I&O:











Intake and Output  


 


 9/20/20 9/21/20





 19:00 07:00


 


Intake Total 830 ml 1050 ml


 


Output Total 925 ml 1040 ml


 


Balance -95 ml 10 ml


 


  


 


Free Water 200 ml 400 ml


 


Tube Feeding 450 ml 650 ml


 


Other 180 ml 


 


Output Urine Total 925 ml 1040 ml


 


# Bowel Movements 50 100








ET-Tube:  7.5


ET Position:  22


Labs:





Laboratory Tests








Test


 9/20/20


18:00 9/21/20


06:14 9/21/20


08:19


 


Stool Occult Blood Pending    


 


White Blood Count


 


 12.4 K/UL


(4.8-10.8)  H 





 


Red Blood Count


 


 2.80 M/UL


(4.20-5.40)  L 





 


Hemoglobin


 


 8.7 G/DL


(12.0-16.0)  L 





 


Hematocrit


 


 26.2 %


(37.0-47.0)  L 





 


Mean Corpuscular Volume  93 FL (80-99)   


 


Mean Corpuscular Hemoglobin


 


 31.2 PG


(27.0-31.0)  H 





 


Mean Corpuscular Hemoglobin


Concent 


 33.5 G/DL


(32.0-36.0) 





 


Red Cell Distribution Width


 


 19.4 %


(11.6-14.8)  H 





 


Platelet Count


 


 89 K/UL


(150-450)  L 





 


Mean Platelet Volume


 


 9.5 FL


(6.5-10.1) 





 


Neutrophils (%) (Auto)


 


 % (45.0-75.0)


 





 


Lymphocytes (%) (Auto)


 


 % (20.0-45.0)


 





 


Monocytes (%) (Auto)   % (1.0-10.0)   


 


Eosinophils (%) (Auto)   % (0.0-3.0)   


 


Basophils (%) (Auto)   % (0.0-2.0)   


 


Neutrophils % (Manual)  Pending   


 


Lymphocytes % (Manual)  Pending   


 


Platelet Estimate  Pending   


 


Platelet Morphology  Pending   


 


Sodium Level


 


 150 MMOL/L


(136-145)  H 





 


Potassium Level


 


 3.5 MMOL/L


(3.5-5.1) 





 


Chloride Level


 


 115 MMOL/L


()  H 





 


Carbon Dioxide Level


 


 27 MMOL/L


(21-32) 





 


Anion Gap


 


 9 mmol/L


(5-15) 





 


Blood Urea Nitrogen


 


 26 mg/dL


(7-18)  H 





 


Creatinine


 


 0.8 MG/DL


(0.55-1.30) 





 


Estimat Glomerular Filtration


Rate 


 > 60 mL/min


(>60) 





 


Glucose Level


 


 201 MG/DL


()  H 





 


Calcium Level


 


 7.6 MG/DL


(8.5-10.1)  L 





 


Phosphorus Level


 


 2.1 MG/DL


(2.5-4.9)  L 





 


Magnesium Level


 


 1.7 MG/DL


(1.8-2.4)  L 





 


Total Bilirubin


 


 0.5 MG/DL


(0.2-1.0) 





 


Aspartate Amino Transf


(AST/SGOT) 


 19 U/L (15-37)


 





 


Alanine Aminotransferase


(ALT/SGPT) 


 25 U/L (12-78)


 





 


Alkaline Phosphatase


 


 46 U/L


() 





 


C-Reactive Protein,


Quantitative 


 3.8 mg/dL


(0.00-0.90)  H 





 


Pro-B-Type Natriuretic Peptide


 


 5966 pg/mL


(0-125)  H 





 


Total Protein


 


 4.5 G/DL


(6.4-8.2)  L 





 


Albumin


 


 1.2 G/DL


(3.4-5.0)  L 





 


Globulin  3.3 g/dL   


 


Albumin/Globulin Ratio


 


 0.4 (1.0-2.7)


L 





 


Arterial Blood pH


 


 


 7.443


(7.350-7.450)


 


Arterial Blood Partial


Pressure CO2 


 


 30.3 mmHg


(35.0-45.0)  L


 


Arterial Blood Partial


Pressure O2 


 


 53.1 mmHg


(75.0-100.0)  L


 


Arterial Blood HCO3


 


 


 20.3 mmol/L


(22.0-26.0)  L


 


Arterial Blood Oxygen


Saturation 


 


 86.4 %


()  *L


 


Arterial Blood Base Excess   -3.0 (-2-2)  L


 


Cole Test   N/a  

















Chano Cherry MD              Sep 21, 2020 10:19

## 2020-09-21 NOTE — INTERNAL MED PROGRESS NOTE
Subjective


Date of Service:  Sep 21, 2020


Physician Name


Sanya Schultz


Attending Physician


Chano Cherry MD





Current Medications








 Medications


  (Trade)  Dose


 Ordered  Sig/Iddi


 Route


 PRN Reason  Start Time


 Stop Time Status Last Admin


Dose Admin


 


 Acetaminophen


  (Tylenol)  650 mg  Q4H  PRN


 GT


 FEVER  8/26/20 11:45


 9/25/20 11:44  9/18/20 12:48





 


 Chlorhexidine


 Gluconate


  (Beatrice-Hex 2%)  1 applic  DAILY@2000


 TOPIC


   8/31/20 20:00


 11/29/20 19:59  9/20/20 20:59





 


 Clotrimazole


  (Lotrimin)  1 applic  Q12HR


 TOPIC


   8/30/20 13:00


 11/28/20 12:59  9/21/20 09:05





 


 Dextrose


  (Dextrose 50%)  25 ml  Q30M  PRN


 IV


 Hypoglycemia  8/26/20 11:30


 11/20/20 11:29   





 


 Dextrose


  (Dextrose 50%)  50 ml  Q30M  PRN


 IV


 Hypoglycemia  8/26/20 11:30


 11/20/20 11:29   





 


 Dopamine HCl/


 Dextrose  250 ml @ 0


 mls/hr  Q24H


 IV


   9/4/20 11:15


 12/3/20 11:14  9/6/20 19:47





 


 Hydrocortisone


  (Solu-CORTEF)  100 mg  EVERY 8  HOURS


 IV


   8/30/20 14:00


 11/28/20 13:59  9/21/20 13:39





 


 Insulin Aspart


  (NovoLOG)    Q6HR


 SUBQ


   9/18/20 12:00


 12/17/20 11:59  9/21/20 12:42





 


 Insulin Detemir


  (Levemir)  10 units  Q12HR


 SUBQ


   9/18/20 10:00


 12/17/20 09:59  9/21/20 09:05





 


 Levothyroxine


 Sodium


  (Synthroid)  75 mcg  DAILY


 GT


   8/27/20 09:00


 9/22/20 08:59  9/21/20 08:57





 


 Loperamide HCl


  (Imodium)  2 mg  Q6H  PRN


 NG


 Diarrhea  9/16/20 10:30


 10/16/20 10:29   





 


 Midodrine


  (Pro-Amatine)  10 mg  Q8HR


 GT


   8/28/20 14:00


 11/26/20 13:59  9/21/20 13:39





 


 Norepinephrine


 Bitartrate 16 mg/


 Dextrose  500 ml @ 0


 mls/hr  Q24H


 IV


   8/31/20 07:45


 9/29/20 12:59  9/4/20 08:43





 


 Pantoprazole


  (Protonix)  40 mg  DAILY


 IV


   8/27/20 09:00


 9/22/20 08:59  9/21/20 08:56





 


 Phenylephrine HCl


 50 mg/Dextrose  250 ml @ 0


 mls/hr  Q24H  PRN


 IV


 For hypotension  8/29/20 17:45


 9/28/20 17:44  8/31/20 01:49





 


 Potassium


 Phosphate 20 mm/


 Sodium Chloride  281.6667


 ml @ 


 46.944 m...  ONCE  ONCE


 IV


   9/21/20 10:00


 9/21/20 15:59  9/21/20 10:20





 


 Potassium Chloride


  (K-Dur)  40 meq  TWICE A  DAY


 GT


   9/20/20 12:15


 12/19/20 12:14  9/21/20 08:55





 


 Valproic Acid


  (Depakene)  500 mg  EVERY 8  HOURS


 GT


   8/26/20 14:00


 9/21/20 21:59  9/21/20 13:39











Allergies:  


Coded Allergies:  


     No Known Allergies (Unverified , 1/8/19)


ROS Limited/Unobtainable:  Yes


Subjective


60 YO F with Down's syndrome admitted with hypoxia.  Now sepsis and pneumonia.  

Cover for Int Med-DR Hung.  ICU.  Intubated and sedated





Objective





Last Vital Signs








  Date Time  Temp Pulse Resp B/P (MAP) Pulse Ox O2 Delivery O2 Flow Rate FiO2


 


9/21/20 12:00  71      


 


9/21/20 12:00        100


 


9/21/20 12:00 98.6  21 95/55 (68) 90   


 


9/21/20 04:00      Mechanical Ventilator  





      Mechanical Ventilator  











Laboratory Tests








Test


 9/20/20


18:00 9/21/20


06:14 9/21/20


08:19


 


Stool Occult Blood Pending    


 


White Blood Count


 


 12.4 K/UL


(4.8-10.8)  H 





 


Red Blood Count


 


 2.80 M/UL


(4.20-5.40)  L 





 


Hemoglobin


 


 8.7 G/DL


(12.0-16.0)  L 





 


Hematocrit


 


 26.2 %


(37.0-47.0)  L 





 


Mean Corpuscular Volume  93 FL (80-99)   


 


Mean Corpuscular Hemoglobin


 


 31.2 PG


(27.0-31.0)  H 





 


Mean Corpuscular Hemoglobin


Concent 


 33.5 G/DL


(32.0-36.0) 





 


Red Cell Distribution Width


 


 19.4 %


(11.6-14.8)  H 





 


Platelet Count


 


 89 K/UL


(150-450)  L 





 


Mean Platelet Volume


 


 9.5 FL


(6.5-10.1) 





 


Neutrophils (%) (Auto)


 


 % (45.0-75.0)


 





 


Lymphocytes (%) (Auto)


 


 % (20.0-45.0)


 





 


Monocytes (%) (Auto)   % (1.0-10.0)   


 


Eosinophils (%) (Auto)   % (0.0-3.0)   


 


Basophils (%) (Auto)   % (0.0-2.0)   


 


Differential Total Cells


Counted 


 100  


 





 


Neutrophils % (Manual)  91 % (45-75)  H 


 


Lymphocytes % (Manual)  4 % (20-45)  L 


 


Monocytes % (Manual)  5 % (1-10)   


 


Eosinophils % (Manual)  0 % (0-3)   


 


Basophils % (Manual)  0 % (0-2)   


 


Band Neutrophils  0 % (0-8)   


 


Platelet Estimate  Decreased  L 


 


Platelet Morphology  Normal   


 


Hypochromasia  1+   


 


Anisocytosis  2+   


 


Sodium Level


 


 150 MMOL/L


(136-145)  H 





 


Potassium Level


 


 3.5 MMOL/L


(3.5-5.1) 





 


Chloride Level


 


 115 MMOL/L


()  H 





 


Carbon Dioxide Level


 


 27 MMOL/L


(21-32) 





 


Anion Gap


 


 9 mmol/L


(5-15) 





 


Blood Urea Nitrogen


 


 26 mg/dL


(7-18)  H 





 


Creatinine


 


 0.8 MG/DL


(0.55-1.30) 





 


Estimat Glomerular Filtration


Rate 


 > 60 mL/min


(>60) 





 


Glucose Level


 


 201 MG/DL


()  H 





 


Calcium Level


 


 7.6 MG/DL


(8.5-10.1)  L 





 


Phosphorus Level


 


 2.1 MG/DL


(2.5-4.9)  L 





 


Magnesium Level


 


 1.7 MG/DL


(1.8-2.4)  L 





 


Total Bilirubin


 


 0.5 MG/DL


(0.2-1.0) 





 


Aspartate Amino Transf


(AST/SGOT) 


 19 U/L (15-37)


 





 


Alanine Aminotransferase


(ALT/SGPT) 


 25 U/L (12-78)


 





 


Alkaline Phosphatase


 


 46 U/L


() 





 


C-Reactive Protein,


Quantitative 


 3.8 mg/dL


(0.00-0.90)  H 





 


Pro-B-Type Natriuretic Peptide


 


 5966 pg/mL


(0-125)  H 





 


Total Protein


 


 4.5 G/DL


(6.4-8.2)  L 





 


Albumin


 


 1.2 G/DL


(3.4-5.0)  L 





 


Globulin  3.3 g/dL   


 


Albumin/Globulin Ratio


 


 0.4 (1.0-2.7)


L 





 


Arterial Blood pH


 


 


 7.443


(7.350-7.450)


 


Arterial Blood Partial


Pressure CO2 


 


 30.3 mmHg


(35.0-45.0)  L


 


Arterial Blood Partial


Pressure O2 


 


 53.1 mmHg


(75.0-100.0)  L


 


Arterial Blood HCO3


 


 


 20.3 mmol/L


(22.0-26.0)  L


 


Arterial Blood Oxygen


Saturation 


 


 86.4 %


()  *L


 


Arterial Blood Base Excess   -3.0 (-2-2)  L


 


Cole Test   N/a  











Microbiology








 Date/Time


Source Procedure


Growth Status





 


 9/19/20 16:34


Urine,Clean Catch Urine Culture - Final


NO GROWTH AFTER 48 HOURS Complete





 9/19/20 16:34


Sputum Gram Stain - Final Resulted


 


 9/19/20 16:34 Sputum Culture - Preliminary


YEAST Resulted

















Intake and Output  


 


 9/20/20 9/21/20





 19:00 07:00


 


Intake Total 830 ml 1050 ml


 


Output Total 925 ml 1040 ml


 


Balance -95 ml 10 ml


 


  


 


Free Water 200 ml 400 ml


 


Tube Feeding 450 ml 650 ml


 


Other 180 ml 


 


Output Urine Total 925 ml 1040 ml


 


# Bowel Movements 50 100








Objective


General Appearance:  WD/WN, no apparent distress, alert


EENT:  PERRL/EOMI, normal ENT inspection


Neck:  non-tender, normal alignment, supple, normal inspection


Cardiovascular:  normal peripheral pulses, normal rate, regular rhythm, no 

gallop/murmur, no JVD


Respiratory/Chest:  Mech vent; decreased breath sounds, crackles/rales, rhonchi 

- bilaterally, expiratory wheezing


Abdomen:  normal bowel sounds, non tender, soft, no organomegaly, no mass


Extremities:  normal range of motion


Neurologic:  CNs II-XII grossly normal


Skin:  normal pigmentation, warm/dry





Assessment/Plan


Problem List:  


(1) HCAP (healthcare-associated pneumonia)


Assessment & Plan:  Strep Group G.  Continue amikacin per ID=Dr Gomes.  

Pulmonary/Critical care=DR Cherry.  COVID NEG





(2) Sepsis


Assessment & Plan:  Staph haemolyticus.  Continue amikacin per ID=Dr Gomes





(3) Down's syndrome


(4) Dysphagia


Assessment & Plan:  S/P PEG





(5) Seizure disorder


Assessment & Plan:  Continue keppra and depakote





(6) Hypothyroidism


Assessment & Plan:  Continue synthroid





(7) Acute respiratory failure


Assessment & Plan:  Pulmonary = Dr Cherry; [facility]














Sanya Schultz MD               Sep 21, 2020 13:46

## 2020-09-21 NOTE — NUR
NURSE NOTES:

Pt received from [facility] at 0730. Pt opens eyes spontaneously; does not follow 
commands; withdraws to pain; bilat pupils equal and round 3 mm with brisk rxn to light. Pt 
is SR to cardiac monitor with 1+ radial and dorsalis pedis pulses present. Pt is afebrile at 
this time. Pt received mechanically ventilated with 7.5 ETT noted 24 cm at the lip with the 
following settings: AC 26  FiO2 70% Peep 10- All lung sounds noted diminished upon 
auscultation. Pt is pending CXR for recent ETT placement. Abd is round, large, and slightly 
firm to palpation with active bowel sounds to all quadrants. GT noted running Vital at 50 
cc/gr without gastric residuals noted at this time. F/C noted draining yellow urine. Skin 
alterations noted. Pt has a CARLOS PICC with dry and intact dressing running NS TKO at 5 cc/hr. 
Bed in lowest position, alarm on, side rails up x 2 and padded per seizure precaution, call 
light within reach. Will continue to monitor.

## 2020-09-21 NOTE — EMERGENCY ROOM REPORT
History of Present Illness


General


Chief Complaint:  Dyspnea/Respdistress


Source:  Medical Record





Present Illness


Allergies:  


Coded Allergies:  


     No Known Allergies (Unverified , 1/8/19)





COVID-19 Screening


Contact w/high risk pt:  No


Experienced COVID-19 symptoms?:  No


COVID-19 Testing performed PTA:  Yes


COVID-19 Screening:  Negative COVID-19


COVID-19 Testing Source:  08/10/20





Patient History


Reviewed Nursing Documentation:  PMH: Agreed; PSxH: Agreed





Nursing Documentation-PMH


Past Medical History:  No History, Except For


Hx Cardiac Problems:  No - PVD


Hx Diabetes:  No - Hypothyroid


Hx Cancer:  No


Hx Gastrointestinal Problems:  No - gastrostomy


Hx Neurological Problems:  Yes - down syndrome


Hx Seizures:  Yes


Hx Speech Problem:  Yes





Physical Exam





Vital Signs








  Date Time  Temp Pulse Resp B/P (MAP) Pulse Ox O2 Delivery O2 Flow Rate FiO2


 


9/17/20 07:00  60 24 106/67 (80) 99   


 


9/17/20 07:09        85


 


9/17/20 08:00      Mechanical Ventilator  





      Mechanical Ventilator  


 


9/17/20 08:00 99.2       











Procedures


Critical Care Time


Critical Care Time


i.  I feel this is a highly complex case requiring extensive working including 

EKG/Rhythm strip, Xray/CT/US, Blood/urine lab work, repeat exams while in ED, 


and administration of strong opiates/narcotics for pain control, admission to 

hospital or close patient follow up.  





Total time: 60 min bedside evaluation and treatment excludes procedures (EKG). 


Reason for critical care: respiratory distress, PTX


Possible complications: hypotension, hypertension, MI, shock, arrhythmias, 

metabolic acidosis, end organ damage, respiratory failure. 


Interventions: Intubation, Thora vent, chest tube


Course: Patient evaluated for possible leak in ET tube.  Patient likely biting 

the tube.  Down syndrome.  Using bougie I was able to exchange the ET tube.  

Equal breath sounds bilaterally.  I was called later by the ICU.  Chest x-ray 

shows pneumothorax.  Intensivist decompressed with needle.  I attempted to fix 

with Thora vent.  Thora vent did help with the pneumothorax and oxygenation did 

improve however was likely an extrapleural space.  I then performed chest tube 

on the right midaxillary line.  Chest tube placement confirmed on chest x-ray.  

Improved expansion of lung noted


Consultations: nursing staff, EMS, family 


Performed by: Dr Koth[place]ota


Tolerated well condition = critical





j.  because of unstable vital signs this patient had a condition that could 

potentially threaten life or limb.  I feel this is a critical patient who 

required my full attention while patient was considered critical.  Total 

Critical Care Time excluding procedures was greater than 60 minutes





Chest Tube


Chest Tube :  


   Consent:  Verbal


   Chest Tube Location:  mid axillary line


   Chest Tube Procedure:  betadine prep, sterile drapes applied, sterile 

dressing applied


   Anesthesia:  1% Lidocaine


   Rush of Air [place]:  Yes


   Number of Attempts:  One


   Tube Drainage:  see nurses notes


   Tube Sutured to Skin:  Yes


   Post Procedure CXR?:  Yes


   Patient Tolerated:  Well


   Complications:  None





Intubation


Intubation :  


   Consent:  Emergent


   Intubation Method:  orotracheal


   Tube Size (cm):  7.5


   Medications:  Etomidate


   Breath Sounds after Intubation:  equal


   Intubation Complications:  no complications


   Post Intubation Xray:  Yes


   Attempts:  One


   Patient Tolerated:  Well


   Complications:  Other - PTX noted





Medical Decision Making


Diagnostic Impression:  


   Primary Impression:  


   ARDS (adult respiratory distress syndrome)


   Additional Impressions:  


   HCAP (healthcare-associated pneumonia)


   Septic shock


   Respiratory failure requiring intubation


ER Course


I was called to the ICU to evaluate this patient.  History of ARDS.  Down 

syndrome.  Patient likely bit through her ET tube and was not achieving good 

peak pressures.  Using bougie I was able to exchange the ET tube without 

difficulty.  Improved breath sounds bilaterally.  I was called later to the ICU 

as chest x-ray shows a pneumothorax on the right side.  Tension.  Was needle 

decompressed by intensivist.  I attempted to with a Thora vent.  It did show 

proved lung expansion but not completely.  The Thora vent there appeared in the 

extrapleural space.  I then placed a right-sided chest tube which did improve 

lung expansion was confirmed on chest x-ray.





Last Vital Signs








  Date Time  Temp Pulse Resp B/P (MAP) Pulse Ox O2 Delivery O2 Flow Rate FiO2


 


9/21/20 15:00  58 22 125/67 (86) 88   


 


9/21/20 12:00      Mechanical Ventilator  





      Mechanical Ventilator  


 


9/21/20 12:00        100


 


9/21/20 12:00 98.6       








Disposition:  ADMITTED AS INPATIENT


Condition:  Critical


Referrals:  


NON PHYSICIAN (PCP)











Gómez Oliveira MD            Sep 21, 2020 15:59

## 2020-09-21 NOTE — CARDIOLOGY PROGRESS NOTE
Assessment/Plan


Assessment/Plan


sepsis


respiratory failure 


ards 


renal insuf 


bacteremia


abn cardiac enzyme due to demand 


sinus arnold stable 


thrombocytopenia resolved  


hypernatremia 











vent support 


abx 


wbc


na increased still but i mproved  


3rd spacing alot 


cxr left side seems lmost white out 


tsh seems fine 


sig edema with hypernatremia need nauteresis eventually 


remains off pressor still at this time 


hr inthe 40's-60's no sig pauses on tele 


tele personally reviewed





Subjective


ROS Limited/Unobtainable:  Yes


Subjective


on  a vent not communicative sleeeping





Objective





Last 24 Hour Vital Signs








  Date Time  Temp Pulse Resp B/P (MAP) Pulse Ox O2 Delivery O2 Flow Rate FiO2


 


9/21/20 18:00  52 26 108/55 (72) 100   


 


9/21/20 17:00  60 46 122/60 (80) 100   


 


9/21/20 16:00  57      


 


9/21/20 16:00 98.9 54 28 126/79 (95) 93   


 


9/21/20 16:00        100


 


9/21/20 16:00      Mechanical Ventilator  





      Mechanical Ventilator  


 


9/21/20 15:00  58 22 125/67 (86) 88   


 


9/21/20 14:40  54 26     100


 


9/21/20 14:00  55 25 133/54 (80) 86   


 


9/21/20 13:00  59 28 125/55 (78) 92   


 


9/21/20 12:00      Mechanical Ventilator  





      Mechanical Ventilator  


 


9/21/20 12:00  71      


 


9/21/20 12:00        100


 


9/21/20 12:00 98.6 76 21 95/55 (68) 90   


 


9/21/20 11:26  67 26     100


 


9/21/20 11:15    92/43    


 


9/21/20 11:00  72 24 92/43 (59) 99   


 


9/21/20 10:30  69 26 81/36 (51) 97   


 


9/21/20 10:15  71 25 78/45 (56) 98   


 


9/21/20 10:00  70 24 83/38 (53) 97   


 


9/21/20 09:00  76 40 102/71 (81) 91   


 


9/21/20 08:30        100


 


9/21/20 08:00  65      


 


9/21/20 08:00 98.4 76 40 117/93 (101) 86   


 


9/21/20 08:00      Mechanical Ventilator  





      Mechanical Ventilator  


 


9/21/20 08:00        70


 


9/21/20 07:45    142/84    


 


9/21/20 07:29  74 26     100


 


9/21/20 07:00  53 26 142/84 (103) 94   


 


9/21/20 07:00  53 26 142/84 (103) 94   


 


9/21/20 06:00  55 24 146/64 (91) 98   


 


9/21/20 05:00  58 24 118/66 (83) 98   


 


9/21/20 04:00      Mechanical Ventilator  





      Mechanical Ventilator  


 


9/21/20 04:00 98.5 56 25 119/91 (100) 99   


 


9/21/20 04:00        85


 


9/21/20 04:00  56      


 


9/21/20 03:00  46 26 142/83 (102) 99   


 


9/21/20 02:59  46 26     70


 


9/21/20 02:00  42 26 126/61 (82) 100   


 


9/21/20 01:00  54 26 131/55 (80) 97   


 


9/21/20 00:00  65 45 99/57 (71) 99   


 


9/21/20 00:00      Mechanical Ventilator  





      Mechanical Ventilator  


 


9/20/20 23:00  51 25 140/73 (95) 100   


 


9/20/20 22:57  51 26     70


 


9/20/20 22:00  44 26 119/63 (81) 100   


 


9/20/20 21:00  56 21 127/58 (81) 100   


 


9/20/20 20:00        85


 


9/20/20 20:00      Mechanical Ventilator  





      Mechanical Ventilator  


 


9/20/20 20:00 97.8 49 23 145/65 (91) 100   


 


9/20/20 20:00  48      


 


9/20/20 19:08  44 26     85


 


9/20/20 19:03  42 26 135/60 (85) 100   








General Appearance:  no apparent distress, on vent, isolation precautions


Neck:  supple


Respiratory/Chest:  crackles/rales - anteriorly righside upper


Abdomen:  normal bowel sounds, non tender, soft


Extremities:  moderate edema











Intake and Output  


 


 9/20/20 9/21/20





 19:00 07:00


 


Intake Total 830 ml 1050 ml


 


Output Total 925 ml 1040 ml


 


Balance -95 ml 10 ml


 


  


 


Free Water 200 ml 400 ml


 


Tube Feeding 450 ml 650 ml


 


Other 180 ml 


 


Output Urine Total 925 ml 1040 ml


 


# Bowel Movements 50 100











Laboratory Tests








Test


 9/21/20


06:14 9/21/20


08:19


 


White Blood Count


 12.4 K/UL


(4.8-10.8)  H 





 


Red Blood Count


 2.80 M/UL


(4.20-5.40)  L 





 


Hemoglobin


 8.7 G/DL


(12.0-16.0)  L 





 


Hematocrit


 26.2 %


(37.0-47.0)  L 





 


Mean Corpuscular Volume 93 FL (80-99)   


 


Mean Corpuscular Hemoglobin


 31.2 PG


(27.0-31.0)  H 





 


Mean Corpuscular Hemoglobin


Concent 33.5 G/DL


(32.0-36.0) 





 


Red Cell Distribution Width


 19.4 %


(11.6-14.8)  H 





 


Platelet Count


 89 K/UL


(150-450)  L 





 


Mean Platelet Volume


 9.5 FL


(6.5-10.1) 





 


Neutrophils (%) (Auto)


 % (45.0-75.0)


 





 


Lymphocytes (%) (Auto)


 % (20.0-45.0)


 





 


Monocytes (%) (Auto)  % (1.0-10.0)   


 


Eosinophils (%) (Auto)  % (0.0-3.0)   


 


Basophils (%) (Auto)  % (0.0-2.0)   


 


Differential Total Cells


Counted 100  


 





 


Neutrophils % (Manual) 91 % (45-75)  H 


 


Lymphocytes % (Manual) 4 % (20-45)  L 


 


Monocytes % (Manual) 5 % (1-10)   


 


Eosinophils % (Manual) 0 % (0-3)   


 


Basophils % (Manual) 0 % (0-2)   


 


Band Neutrophils 0 % (0-8)   


 


Platelet Estimate Decreased  L 


 


Platelet Morphology Normal   


 


Hypochromasia 1+   


 


Anisocytosis 2+   


 


Sodium Level


 150 MMOL/L


(136-145)  H 





 


Potassium Level


 3.5 MMOL/L


(3.5-5.1) 





 


Chloride Level


 115 MMOL/L


()  H 





 


Carbon Dioxide Level


 27 MMOL/L


(21-32) 





 


Anion Gap


 9 mmol/L


(5-15) 





 


Blood Urea Nitrogen


 26 mg/dL


(7-18)  H 





 


Creatinine


 0.8 MG/DL


(0.55-1.30) 





 


Estimat Glomerular Filtration


Rate > 60 mL/min


(>60) 





 


Glucose Level


 201 MG/DL


()  H 





 


Calcium Level


 7.6 MG/DL


(8.5-10.1)  L 





 


Phosphorus Level


 2.1 MG/DL


(2.5-4.9)  L 





 


Magnesium Level


 1.7 MG/DL


(1.8-2.4)  L 





 


Total Bilirubin


 0.5 MG/DL


(0.2-1.0) 





 


Aspartate Amino Transf


(AST/SGOT) 19 U/L (15-37)


 





 


Alanine Aminotransferase


(ALT/SGPT) 25 U/L (12-78)


 





 


Alkaline Phosphatase


 46 U/L


() 





 


C-Reactive Protein,


Quantitative 3.8 mg/dL


(0.00-0.90)  H 





 


Pro-B-Type Natriuretic Peptide


 5966 pg/mL


(0-125)  H 





 


Total Protein


 4.5 G/DL


(6.4-8.2)  L 





 


Albumin


 1.2 G/DL


(3.4-5.0)  L 





 


Globulin 3.3 g/dL   


 


Albumin/Globulin Ratio


 0.4 (1.0-2.7)


L 





 


Arterial Blood pH


 


 7.443


(7.350-7.450)


 


Arterial Blood Partial


Pressure CO2 


 30.3 mmHg


(35.0-45.0)  L


 


Arterial Blood Partial


Pressure O2 


 53.1 mmHg


(75.0-100.0)  L


 


Arterial Blood HCO3


 


 20.3 mmol/L


(22.0-26.0)  L


 


Arterial Blood Oxygen


Saturation 


 86.4 %


()  *L


 


Arterial Blood Base Excess  -3.0 (-2-2)  L


 


Cole Test  N/a  











Microbiology








 Date/Time


Source Procedure


Growth Status





 


 9/19/20 16:34


Urine,Clean Catch Urine Culture - Final


NO GROWTH AFTER 48 HOURS Complete





 9/19/20 16:34


Sputum Gram Stain - Final Resulted


 


 9/19/20 16:34 Sputum Culture - Preliminary


YEAST Resulted

















Constantine Jorgensen MD          Sep 21, 2020 18:33

## 2020-09-21 NOTE — NUR
NURSE NOTES:

ABG results relayed tro Dr Cherry. FiO2 increased to 100% at this time. No new orders 
received.

## 2020-09-21 NOTE — NUR
NURSE HAND-OFF REPORT: 



Latest Vital Signs: Temperature 98.5 , Pulse 55 , B/P 146 /64 , Respiratory Rate 24 , O2 SAT 
98 , Mechanical Ventilator, O2 Flow Rate 15.0 .  

Vital Sign Comment: 



EKG Rhythm: Sinus Bradycardia

Rhythm change?: N 

MD Notified?: -aric MENDENHALL Response: at 0630



Latest Momin Fall Score: 70  

Fall Risk: High Risk 

Safety Measures: Call light Within Reach, Bed Alarm Zone 1, Side Rails Side Rails x3, Bed 
position Low and Locked.

Fall Precautions: 

Yellow Socks

Door Sign

Patient Fall Education



Report given to anu mccurdy using sbar.

## 2020-09-21 NOTE — NUR
NURSE NOTES:

Dr Oliveira, ER MD contacted by Dr Uriostegui, radiologist stating thoravent is not fully 
inserted. 

Chest tube (pleuravac) now inserted at bedside per Dr Oliveira - awaiting CXR results.

## 2020-09-21 NOTE — NUR
NURSE NOTES:

Dr Cherry present in the unit - made aware pt has a right-sided tension PNX per Dr Uriostegui. 

An 18g large bore needle was inserted in the right upper chest area per Dr Cherry - ER 
doctor was also contacted for thoravent insertion.

## 2020-09-21 NOTE — NUR
NURSE HAND-OFF REPORT: 



Latest Vital Signs: Temperature 98.9 , Pulse 46 , B/P 140 /64 , Respiratory Rate 26 , O2 SAT 
100 , Mechanical Ventilator, O2 Flow Rate 15.0 .  



EKG Rhythm: Sinus Bradycardia

Rhythm change?: RENU MENDENHALL Notified?: Y -Dr Mehul MENDENHALL Response: No New Orders Received



Latest Momin Fall Score: 70  

Fall Risk: High Risk 

Safety Measures: Call light Within Reach, Bed Alarm Zone 1, Side Rails Side Rails x3, Bed 
position Low and Locked.

Fall Precautions: 

Yellow Socks

Door Sign

Patient Fall Education



Report given to LAYTON Brian.

## 2020-09-21 NOTE — NUR
NURSE NOTES:

received report from anu mccurdy pt orally intubated to vent with o2 sat 100%  chest tube 
-suction 20  chest dressing dry and  intact i reposition and suction tolerating tube feeding 
no residual iv infusing well site good dressing dry and intact

## 2020-09-21 NOTE — INFECTIOUS DISEASES PROG NOTE
Assessment/Plan








ASSESSMENT:


sp code blue 8/26


Septic Shock; SP


Fever, recurrent- low grade over night 


Leukocytosis; persistent/fluctuating, improved 


   -9/9 u/a no pyuria


   -9/8 Bcx NTD (Picc line)


   -9/6 Bcx NTD


            ucx Neg


             sp cx C. parapsilopsis


  -8/26 u/a no pyuria





Pneumonia.- COVID 19 neg x3


   Acute hypoxic resp failure on VM> NRB 15l 100%; hypoxic on ABG> now VDRF 

8/26- Fio2 80% >100% 8/28> 60% 9/1 >80% 9/2   >95% 9/10 >90% 9/14 >60% 9/15


       -9/21 CXR: Interim reexpansion of previously demonstrated right 

pneumothorax, status post large bore chest tube placement.


      -9/14 CXR: Improved aeration of both lungs.


       -9/5 CXR:Small bilateral pleural effusions with minor edema.  Stable 

edema with  mild worsening in the degree of pleural effusion on the right.


         -9/1 sp cx Neg


         8/31 CXR: Extensive bilateral interstitial and airspace disease appears

similar to the prior exam. Moderate to large bilateral pleural effusions appear 

unchanged.


   8/28 CXR: Increasing left upper lobe dense consolidation and likely 

increasing bilateral pleural fluid. Persistent diffuse dense consolidation 

elsewhere


            -8/26 sp cx normal resp lilliam


             -8/25 CXR: Increased atelectasis of the right lung, since prior 

exam of 3 days earlier. New or increased right pleural effusion. Increased left 

basilar consolidation and/or pleural fluid 


          -COVID Rapid PCR neg 8/23, 8/23, 8/26


          -8/22 spc x Group G strep


          -8/22 CXR:   Reduced lung volumes.  Patchy bilateral predominantly 

interstitial  pulmonary opacities.  Could be from edema and/or pneumonia.  There

is a broader differential.


 


        -legionella ag urine, blasto ab, Histo ab, HIV ab screen, JOY, ANCA neg





Persistent, high grade bacteremia-


     -8/22 Bcx 4/4 sets S. haemolyticus; 8/23 Bcx 3/4 S/ epi; 8/27 Bcx 1.4 S. 

warnerri; 8/29 Bcx Neg


     -2d echo: no vegetaions seen





          ua/ wbc 10-15, nit neg, leuk +1; ucx Neg





DAVID; 


 -supratherapeutic vanco levels





-Seizure disorder.


- Hypothyroidism.


- Down syndrome.





History of PEG tube placement.


[facility] resident





PLAN:





Continue to monitor off abx, if more episode of fever, will start Empiric AB Rx 


          9/14 SP Meropenem #18, IV AMikacin #7


          9/4/20 SP Daptomycin #11


          9/2 SP MIcafungin #7, Linezolid #5


   8/28 SP Azithromycin #7/7


   8/26 SP Ceftriaxone #2


   8/25 SP IV Vancomycin #4, Zosyn #4


   8/22 SP Cefepime x1, Flagyl x1





- Monitor CBC, BMP.


.f/u  Repeat cx


- COVID neg x3


- Monitor chest x-ray.


- Monitor the patient's clinical course and labs.  Based on those, we will do 

further recommendation.


-f/u Bcx from picc line, cdiff if diarrhea


-f/u Fungitell, asp ag, flow cytometry


-Once stable, CT chest/abd/pw/ given persistent leukocytosis


-poor prognosis


- pan Cx ( B,U, S) 








Thank you, Dr. Cherry, for allowing me to participate in the care of


this patient.  I will follow the patient with you at this


hospitalization.








Discussed with RN





Subjective


Allergies:  


Coded Allergies:  


     No Known Allergies (Unverified , 1/8/19)


*late entry*





afebrile >48hrs


Fio2 100%


on pressors


leukocytosis improving





Objective





Last 24 Hour Vital Signs








  Date Time  Temp Pulse Resp B/P (MAP) Pulse Ox O2 Delivery O2 Flow Rate FiO2


 


9/21/20 19:25  51 26     100


 


9/21/20 19:00  46 26 140/64 (89) 100   


 


9/21/20 18:00  52 26 108/55 (72) 100   


 


9/21/20 18:00        100


 


9/21/20 17:00  60 46 122/60 (80) 100   


 


9/21/20 16:00  57      


 


9/21/20 16:00 98.9 54 28 126/79 (95) 93   


 


9/21/20 16:00        100


 


9/21/20 16:00      Mechanical Ventilator  





      Mechanical Ventilator  


 


9/21/20 15:00  58 22 125/67 (86) 88   


 


9/21/20 14:40  54 26     100


 


9/21/20 14:00  55 25 133/54 (80) 86   


 


9/21/20 13:00  59 28 125/55 (78) 92   


 


9/21/20 12:00      Mechanical Ventilator  





      Mechanical Ventilator  


 


9/21/20 12:00  71      


 


9/21/20 12:00        100


 


9/21/20 12:00 98.6 76 21 95/55 (68) 90   


 


9/21/20 11:26  67 26     100


 


9/21/20 11:15    92/43    


 


9/21/20 11:00  72 24 92/43 (59) 99   


 


9/21/20 10:30  69 26 81/36 (51) 97   


 


9/21/20 10:15  71 25 78/45 (56) 98   


 


9/21/20 10:00  70 24 83/38 (53) 97   


 


9/21/20 09:00  76 40 102/71 (81) 91   


 


9/21/20 08:30        100


 


9/21/20 08:00  65      


 


9/21/20 08:00 98.4 76 40 117/93 (101) 86   


 


9/21/20 08:00      Mechanical Ventilator  





      Mechanical Ventilator  


 


9/21/20 08:00        70


 


9/21/20 07:45    142/84    


 


9/21/20 07:29  74 26     100


 


9/21/20 07:00  53 26 142/84 (103) 94   


 


9/21/20 07:00  53 26 142/84 (103) 94   


 


9/21/20 06:00  55 24 146/64 (91) 98   


 


9/21/20 05:00  58 24 118/66 (83) 98   


 


9/21/20 04:00      Mechanical Ventilator  





      Mechanical Ventilator  


 


9/21/20 04:00 98.5 56 25 119/91 (100) 99   


 


9/21/20 04:00        85


 


9/21/20 04:00  56      


 


9/21/20 03:00  46 26 142/83 (102) 99   


 


9/21/20 02:59  46 26     70


 


9/21/20 02:00  42 26 126/61 (82) 100   


 


9/21/20 01:00  54 26 131/55 (80) 97   


 


9/21/20 00:00  65 45 99/57 (71) 99   


 


9/21/20 00:00      Mechanical Ventilator  





      Mechanical Ventilator  


 


9/20/20 23:00  51 25 140/73 (95) 100   


 


9/20/20 22:57  51 26     70


 


9/20/20 22:00  44 26 119/63 (81) 100   








Height (Feet):  5


Height (Inches):  3.00


Weight (Pounds):  172


HEENT:  No pale conjunctivae.  No icterus. ETT in place


NECK:  No lymphadenopathy.


CHEST:  Coarse breathing sounds.


HEART:  S1 and S2.


ABDOMEN:  Soft.  PEG tube in place.


EXTREMITIES:  No cyanosis at this time .


SKIN: no rash





Microbiology








 Date/Time


Source Procedure


Growth Status





 


 9/19/20 16:34


Urine,Clean Catch Urine Culture - Final


NO GROWTH AFTER 48 HOURS Complete





 9/19/20 16:34


Sputum Gram Stain - Final Resulted


 


 9/19/20 16:34 Sputum Culture - Preliminary


YEAST Resulted











Laboratory Tests








Test


 9/21/20


06:14 9/21/20


08:19


 


White Blood Count


 12.4 K/UL


(4.8-10.8)  H 





 


Red Blood Count


 2.80 M/UL


(4.20-5.40)  L 





 


Hemoglobin


 8.7 G/DL


(12.0-16.0)  L 





 


Hematocrit


 26.2 %


(37.0-47.0)  L 





 


Mean Corpuscular Volume 93 FL (80-99)   


 


Mean Corpuscular Hemoglobin


 31.2 PG


(27.0-31.0)  H 





 


Mean Corpuscular Hemoglobin


Concent 33.5 G/DL


(32.0-36.0) 





 


Red Cell Distribution Width


 19.4 %


(11.6-14.8)  H 





 


Platelet Count


 89 K/UL


(150-450)  L 





 


Mean Platelet Volume


 9.5 FL


(6.5-10.1) 





 


Neutrophils (%) (Auto)


 % (45.0-75.0)


 





 


Lymphocytes (%) (Auto)


 % (20.0-45.0)


 





 


Monocytes (%) (Auto)  % (1.0-10.0)   


 


Eosinophils (%) (Auto)  % (0.0-3.0)   


 


Basophils (%) (Auto)  % (0.0-2.0)   


 


Differential Total Cells


Counted 100  


 





 


Neutrophils % (Manual) 91 % (45-75)  H 


 


Lymphocytes % (Manual) 4 % (20-45)  L 


 


Monocytes % (Manual) 5 % (1-10)   


 


Eosinophils % (Manual) 0 % (0-3)   


 


Basophils % (Manual) 0 % (0-2)   


 


Band Neutrophils 0 % (0-8)   


 


Platelet Estimate Decreased  L 


 


Platelet Morphology Normal   


 


Hypochromasia 1+   


 


Anisocytosis 2+   


 


Sodium Level


 150 MMOL/L


(136-145)  H 





 


Potassium Level


 3.5 MMOL/L


(3.5-5.1) 





 


Chloride Level


 115 MMOL/L


()  H 





 


Carbon Dioxide Level


 27 MMOL/L


(21-32) 





 


Anion Gap


 9 mmol/L


(5-15) 





 


Blood Urea Nitrogen


 26 mg/dL


(7-18)  H 





 


Creatinine


 0.8 MG/DL


(0.55-1.30) 





 


Estimat Glomerular Filtration


Rate > 60 mL/min


(>60) 





 


Glucose Level


 201 MG/DL


()  H 





 


Calcium Level


 7.6 MG/DL


(8.5-10.1)  L 





 


Phosphorus Level


 2.1 MG/DL


(2.5-4.9)  L 





 


Magnesium Level


 1.7 MG/DL


(1.8-2.4)  L 





 


Total Bilirubin


 0.5 MG/DL


(0.2-1.0) 





 


Aspartate Amino Transf


(AST/SGOT) 19 U/L (15-37)


 





 


Alanine Aminotransferase


(ALT/SGPT) 25 U/L (12-78)


 





 


Alkaline Phosphatase


 46 U/L


() 





 


C-Reactive Protein,


Quantitative 3.8 mg/dL


(0.00-0.90)  H 





 


Pro-B-Type Natriuretic Peptide


 5966 pg/mL


(0-125)  H 





 


Total Protein


 4.5 G/DL


(6.4-8.2)  L 





 


Albumin


 1.2 G/DL


(3.4-5.0)  L 





 


Globulin 3.3 g/dL   


 


Albumin/Globulin Ratio


 0.4 (1.0-2.7)


L 





 


Arterial Blood pH


 


 7.443


(7.350-7.450)


 


Arterial Blood Partial


Pressure CO2 


 30.3 mmHg


(35.0-45.0)  L


 


Arterial Blood Partial


Pressure O2 


 53.1 mmHg


(75.0-100.0)  L


 


Arterial Blood HCO3


 


 20.3 mmol/L


(22.0-26.0)  L


 


Arterial Blood Oxygen


Saturation 


 86.4 %


()  *L


 


Arterial Blood Base Excess  -3.0 (-2-2)  L


 


Cole Test  N/a  











Current Medications








 Medications


  (Trade)  Dose


 Ordered  Sig/Didi


 Route


 PRN Reason  Start Time


 Stop Time Status Last Admin


Dose Admin


 


 Acetaminophen


  (Tylenol)  650 mg  Q4H  PRN


 GT


 FEVER  8/26/20 11:45


 9/25/20 11:44  9/18/20 12:48





 


 Chlorhexidine


 Gluconate


  (Beatrice-Hex 2%)  1 applic  DAILY@2000


 TOPIC


   8/31/20 20:00


 11/29/20 19:59  9/20/20 20:59





 


 Clotrimazole


  (Lotrimin)  1 applic  Q12HR


 TOPIC


   8/30/20 13:00


 11/28/20 12:59  9/21/20 20:48





 


 Dextrose


  (Dextrose 50%)  25 ml  Q30M  PRN


 IV


 Hypoglycemia  8/26/20 11:30


 11/20/20 11:29   





 


 Dextrose


  (Dextrose 50%)  50 ml  Q30M  PRN


 IV


 Hypoglycemia  8/26/20 11:30


 11/20/20 11:29   





 


 Dopamine HCl/


 Dextrose  250 ml @ 0


 mls/hr  Q24H


 IV


   9/4/20 11:15


 12/3/20 11:14  9/6/20 19:47





 


 Hydrocortisone


  (Solu-CORTEF)  100 mg  EVERY 8  HOURS


 IV


   8/30/20 14:00


 11/28/20 13:59  9/21/20 13:39





 


 Insulin Aspart


  (NovoLOG)    Q6HR


 SUBQ


   9/18/20 12:00


 12/17/20 11:59  9/21/20 12:42





 


 Insulin Detemir


  (Levemir)  10 units  Q12HR


 SUBQ


   9/18/20 10:00


 12/17/20 09:59  9/21/20 20:48





 


 Levothyroxine


 Sodium


  (Synthroid)  75 mcg  DAILY


 GT


   8/27/20 09:00


 9/22/20 08:59  9/21/20 08:57





 


 Loperamide HCl


  (Imodium)  2 mg  Q6H  PRN


 NG


 Diarrhea  9/16/20 10:30


 10/16/20 10:29   





 


 Midodrine


  (Pro-Amatine)  10 mg  Q8HR


 GT


   8/28/20 14:00


 11/26/20 13:59  9/21/20 13:39





 


 Norepinephrine


 Bitartrate 16 mg/


 Dextrose  500 ml @ 0


 mls/hr  Q24H


 IV


   8/31/20 07:45


 9/29/20 12:59  9/4/20 08:43





 


 Pantoprazole


  (Protonix)  40 mg  DAILY


 IV


   8/27/20 09:00


 9/22/20 08:59  9/21/20 08:56





 


 Phenylephrine HCl


 50 mg/Dextrose  250 ml @ 0


 mls/hr  Q24H  PRN


 IV


 For hypotension  8/29/20 17:45


 9/28/20 17:44  8/31/20 01:49





 


 Potassium Chloride


  (K-Dur)  40 meq  TWICE A  DAY


 GT


   9/20/20 12:15


 12/19/20 12:14  9/21/20 17:30





 


 Valproic Acid


  (Depakene)  500 mg  EVERY 8  HOURS


 GT


   8/26/20 14:00


 9/21/20 21:59  9/21/20 13:39




















Rema Gomes M.D.            Sep 21, 2020 21:34

## 2020-09-21 NOTE — NUR
NURSE NOTES:

Thoravent placed per GIO Mijares MD. 

Connected to wall suction (low continuous) per Dr Cherry's order.

## 2020-09-21 NOTE — NUR
NURSE NOTES:

Dr Cherry assessing pt at bedside. ABGs and labs for today reviewed. SBP noted in 70s. 
Order received to bolus 1 liter NS IV. Bolus initiated at this time.

## 2020-09-21 NOTE — NUR
RESPIRATORY NOTE:

PT REMAINED STABLE ON CMV WITH CURRENT SETTINGS. SX PRN. AIRWAY IS SECURE AND PATENT. VENT 
CIRCUIT IS SECURE AND OUT OF THE WAY. NO S/S OF RESPIRATORY DISTRESS NOTED AT THIS TIME.

## 2020-09-21 NOTE — NUR
NURSE NOTES:pt having resp  distress tv 0  peep pressure 70 ett  ballon basted   resp tech  
suction and ett flush  and dr anguiano was call and roxann 4x did not anser all caii nursing 
supervisor and charge nurse was notify resp tech able to in>[phone] [phone] pt able open eyes to 
touch vs stable

## 2020-09-21 NOTE — NUR
CASE MANAGEMENT: REVIEW





SI: DAVID . RESP FAILURE . DOWN'S SYNDROME . RENAL FAILURE

T 98.4 HR 55 RR 28 BP 92/43 SAT 90% MECH VENT FIO2 100

WBC 12.4 H/H 8.7/26.2  BNP 5966 











IS: K PHOS IV X1

K DUR 40MEQ GT BID

DOPAMINE IV Q24HR

SOLU CORTEF IV Q8HR

LEVOPHED IV Q24HR

MIDODRINE GT Q8HR 











***ICU STATUS***

DCP: PATIENT IS FROM [facility]

## 2020-09-22 VITALS — SYSTOLIC BLOOD PRESSURE: 151 MMHG | DIASTOLIC BLOOD PRESSURE: 72 MMHG

## 2020-09-22 VITALS — DIASTOLIC BLOOD PRESSURE: 88 MMHG | SYSTOLIC BLOOD PRESSURE: 182 MMHG

## 2020-09-22 VITALS — DIASTOLIC BLOOD PRESSURE: 83 MMHG | SYSTOLIC BLOOD PRESSURE: 146 MMHG

## 2020-09-22 VITALS — SYSTOLIC BLOOD PRESSURE: 111 MMHG | DIASTOLIC BLOOD PRESSURE: 53 MMHG

## 2020-09-22 VITALS — DIASTOLIC BLOOD PRESSURE: 86 MMHG | SYSTOLIC BLOOD PRESSURE: 112 MMHG

## 2020-09-22 VITALS — SYSTOLIC BLOOD PRESSURE: 143 MMHG | DIASTOLIC BLOOD PRESSURE: 84 MMHG

## 2020-09-22 VITALS — DIASTOLIC BLOOD PRESSURE: 72 MMHG | SYSTOLIC BLOOD PRESSURE: 118 MMHG

## 2020-09-22 VITALS — SYSTOLIC BLOOD PRESSURE: 140 MMHG | DIASTOLIC BLOOD PRESSURE: 69 MMHG

## 2020-09-22 VITALS — SYSTOLIC BLOOD PRESSURE: 118 MMHG | DIASTOLIC BLOOD PRESSURE: 59 MMHG

## 2020-09-22 VITALS — DIASTOLIC BLOOD PRESSURE: 60 MMHG | SYSTOLIC BLOOD PRESSURE: 128 MMHG

## 2020-09-22 VITALS — DIASTOLIC BLOOD PRESSURE: 60 MMHG | SYSTOLIC BLOOD PRESSURE: 130 MMHG

## 2020-09-22 VITALS — DIASTOLIC BLOOD PRESSURE: 58 MMHG | SYSTOLIC BLOOD PRESSURE: 109 MMHG

## 2020-09-22 VITALS — SYSTOLIC BLOOD PRESSURE: 160 MMHG | DIASTOLIC BLOOD PRESSURE: 74 MMHG

## 2020-09-22 VITALS — DIASTOLIC BLOOD PRESSURE: 54 MMHG | SYSTOLIC BLOOD PRESSURE: 128 MMHG

## 2020-09-22 VITALS — SYSTOLIC BLOOD PRESSURE: 132 MMHG | DIASTOLIC BLOOD PRESSURE: 59 MMHG

## 2020-09-22 VITALS — DIASTOLIC BLOOD PRESSURE: 78 MMHG | SYSTOLIC BLOOD PRESSURE: 153 MMHG

## 2020-09-22 VITALS — SYSTOLIC BLOOD PRESSURE: 147 MMHG | DIASTOLIC BLOOD PRESSURE: 88 MMHG

## 2020-09-22 VITALS — SYSTOLIC BLOOD PRESSURE: 158 MMHG | DIASTOLIC BLOOD PRESSURE: 71 MMHG

## 2020-09-22 VITALS — SYSTOLIC BLOOD PRESSURE: 101 MMHG | DIASTOLIC BLOOD PRESSURE: 55 MMHG

## 2020-09-22 VITALS — SYSTOLIC BLOOD PRESSURE: 139 MMHG | DIASTOLIC BLOOD PRESSURE: 55 MMHG

## 2020-09-22 VITALS — SYSTOLIC BLOOD PRESSURE: 104 MMHG | DIASTOLIC BLOOD PRESSURE: 53 MMHG

## 2020-09-22 VITALS — DIASTOLIC BLOOD PRESSURE: 62 MMHG | SYSTOLIC BLOOD PRESSURE: 118 MMHG

## 2020-09-22 VITALS — DIASTOLIC BLOOD PRESSURE: 54 MMHG | SYSTOLIC BLOOD PRESSURE: 109 MMHG

## 2020-09-22 VITALS — DIASTOLIC BLOOD PRESSURE: 60 MMHG | SYSTOLIC BLOOD PRESSURE: 121 MMHG

## 2020-09-22 LAB
ADD MANUAL DIFF: YES
ALBUMIN SERPL-MCNC: 1.2 G/DL (ref 3.4–5)
ALBUMIN/GLOB SERPL: 0.4 {RATIO} (ref 1–2.7)
ALP SERPL-CCNC: 47 U/L (ref 46–116)
ALT SERPL-CCNC: 43 U/L (ref 12–78)
ANION GAP SERPL CALC-SCNC: 8 MMOL/L (ref 5–15)
AST SERPL-CCNC: 35 U/L (ref 15–37)
BILIRUB SERPL-MCNC: 0.7 MG/DL (ref 0.2–1)
BUN SERPL-MCNC: 28 MG/DL (ref 7–18)
CALCIUM SERPL-MCNC: 7.7 MG/DL (ref 8.5–10.1)
CHLORIDE SERPL-SCNC: 116 MMOL/L (ref 98–107)
CO2 SERPL-SCNC: 27 MMOL/L (ref 21–32)
CREAT SERPL-MCNC: 0.8 MG/DL (ref 0.55–1.3)
ERYTHROCYTE [DISTWIDTH] IN BLOOD BY AUTOMATED COUNT: 18.5 % (ref 11.6–14.8)
GLOBULIN SER-MCNC: 3.4 G/DL
HCT VFR BLD CALC: 27.1 % (ref 37–47)
HGB BLD-MCNC: 9 G/DL (ref 12–16)
MCV RBC AUTO: 94 FL (ref 80–99)
PHOSPHATE SERPL-MCNC: 3 MG/DL (ref 2.5–4.9)
PLATELET # BLD: 74 K/UL (ref 150–450)
POTASSIUM SERPL-SCNC: 4 MMOL/L (ref 3.5–5.1)
RBC # BLD AUTO: 2.89 M/UL (ref 4.2–5.4)
SODIUM SERPL-SCNC: 151 MMOL/L (ref 136–145)
WBC # BLD AUTO: 9.7 K/UL (ref 4.8–10.8)

## 2020-09-22 RX ADMIN — HYDROCORTISONE SODIUM SUCCINATE SCH MG: 100 INJECTION, POWDER, FOR SOLUTION INTRAMUSCULAR; INTRAVENOUS at 21:44

## 2020-09-22 RX ADMIN — INSULIN DETEMIR SCH UNITS: 100 INJECTION, SOLUTION SUBCUTANEOUS at 20:56

## 2020-09-22 RX ADMIN — MIDODRINE HYDROCHLORIDE SCH MG: 10 TABLET ORAL at 14:00

## 2020-09-22 RX ADMIN — INSULIN ASPART SCH UNITS: 100 INJECTION, SOLUTION INTRAVENOUS; SUBCUTANEOUS at 05:37

## 2020-09-22 RX ADMIN — INSULIN ASPART SCH UNITS: 100 INJECTION, SOLUTION INTRAVENOUS; SUBCUTANEOUS at 00:00

## 2020-09-22 RX ADMIN — DOPAMINE HYDROCHLORIDE IN DEXTROSE SCH MLS/HR: 1.6 INJECTION, SOLUTION INTRAVENOUS at 11:15

## 2020-09-22 RX ADMIN — SODIUM CHLORIDE SCH MLS/HR: 900 INJECTION, SOLUTION INTRAVENOUS at 07:45

## 2020-09-22 RX ADMIN — INSULIN DETEMIR SCH UNITS: 100 INJECTION, SOLUTION SUBCUTANEOUS at 08:59

## 2020-09-22 RX ADMIN — MIDODRINE HYDROCHLORIDE SCH MG: 10 TABLET ORAL at 05:36

## 2020-09-22 RX ADMIN — HYDROCORTISONE SODIUM SUCCINATE SCH MG: 100 INJECTION, POWDER, FOR SOLUTION INTRAMUSCULAR; INTRAVENOUS at 05:36

## 2020-09-22 RX ADMIN — CHLORHEXIDINE GLUCONATE SCH APPLIC: 213 SOLUTION TOPICAL at 20:55

## 2020-09-22 RX ADMIN — HYDROCORTISONE SODIUM SUCCINATE SCH MG: 100 INJECTION, POWDER, FOR SOLUTION INTRAMUSCULAR; INTRAVENOUS at 14:10

## 2020-09-22 RX ADMIN — INSULIN ASPART SCH UNITS: 100 INJECTION, SOLUTION INTRAVENOUS; SUBCUTANEOUS at 12:00

## 2020-09-22 RX ADMIN — INSULIN ASPART SCH UNITS: 100 INJECTION, SOLUTION INTRAVENOUS; SUBCUTANEOUS at 19:25

## 2020-09-22 RX ADMIN — MIDODRINE HYDROCHLORIDE SCH MG: 10 TABLET ORAL at 21:44

## 2020-09-22 NOTE — NUR
RD ASSESSMENT & RECOMMENDATIONS

SEE CARE ACTIVITY FOR COMPLETE ASSESSMENT



DAILY ESTIMATED NEEDS:

Needs based on CRITICAL CARE, 50kg  

22-28  kcals/kg 

9926-1550  total kcals

1.25-2  g protein/kg

  g total protein 

25-30  mL/kg

7941-6802  total fluid mLs



NUTRITION DIAGNOSIS:

Swallowing difficulty r/t dysphagia as evidenced by pt w/ Downs Syndrome,

PEG dep for all nutritional needs, now intubated, now off pressor support,

ICU status.



CURRENT TF:Vital AF 1.2 @ 60ml/hr x 22 hrs  



ENTERAL NUTRITION RECOMMENDATIONS:

VITAL AF 1.2 @ 50ml/hr x22 hrs   to provide 1100ml, 1320 kcal, 83g pro, 892ml free H2O  



- Maintain Vital AF for carb control, critical care, elemental feeds for diarrhea

- LOWER goal rate to 50ml/hr not to exceed est kcal needs, also for

improved BG control and help alleviate diarrhea

      -> will meet 100% est kcal/prot needs

- Hold TF 1 hr before and after synthroid meds.

- Flush per MD, HOB over 30 degrees.





ADDITIONAL RECOMMENDATIONS:

1) Ht of 61 inches per SNF; recalibrate bed scale for accurate CBW  

2) Add NISS: BGs now in the 200's, on Solucortef 

3) Monitor hemodynamic stability: pressors held at this time 

4) Wound healing: Continue Vit C 250mg QD + Marcio BID 

5) Monitor lytes, replete as needed (low K and phos) 

6) Add probiotics for diarrhea 

.

## 2020-09-22 NOTE — INFECTIOUS DISEASES PROG NOTE
Assessment/Plan








ASSESSMENT:


sp code blue 8/26


Septic Shock; SP


Fever, recurrent- low grade over night 


Leukocytosis; persistent/fluctuating, improved 


   -9/9 u/a no pyuria


   -9/8 Bcx NTD (Picc line)


   -9/6 Bcx NTD


            ucx Neg


             sp cx C. parapsilopsis


  -8/26 u/a no pyuria





Pneumonia.- COVID 19 neg x3


   Acute hypoxic resp failure on VM> NRB 15l 100%; hypoxic on ABG> now VDRF 

8/26- Fio2 80% >100% 8/28> 60% 9/1 >80% 9/2   >95% 9/10 >90% 9/14 >60% 9/15


R tension Pneumothroax sp CT 9/21


       -9/22 CXR: Interim complete reexpansion of right lung without evidence of

residual pneumothorax. Bilateral diffuse and extensive infiltrates. Markedly 

improved chest wall subcutaneous emphysema


       -9/21 CXR: Interim reexpansion of previously demonstrated right 

pneumothorax, status post large bore chest tube placement.


      -9/14 CXR: Improved aeration of both lungs.


       -9/5 CXR:Small bilateral pleural effusions with minor edema.  Stable 

edema with  mild worsening in the degree of pleural effusion on the right.


         -9/1 sp cx Neg


         8/31 CXR: Extensive bilateral interstitial and airspace disease appears

similar to the prior exam. Moderate to large bilateral pleural effusions appear 

unchanged.


   8/28 CXR: Increasing left upper lobe dense consolidation and likely 

increasing bilateral pleural fluid. Persistent diffuse dense consolidation 

elsewhere


            -8/26 sp cx normal resp lilliam


             -8/25 CXR: Increased atelectasis of the right lung, since prior 

exam of 3 days earlier. New or increased right pleural effusion. Increased left 

basilar consolidation and/or pleural fluid 


          -COVID Rapid PCR neg 8/23, 8/23, 8/26


          -8/22 spc x Group G strep


          -8/22 CXR:   Reduced lung volumes.  Patchy bilateral predominantly 

interstitial  pulmonary opacities.  Could be from edema and/or pneumonia.  There

is a broader differential.


 


        -legionella ag urine, blasto ab, Histo ab, HIV ab screen, JOY, ANCA neg





Persistent, high grade bacteremia-


     -8/22 Bcx 4/4 sets S. haemolyticus; 8/23 Bcx 3/4 S/ epi; 8/27 Bcx 1.4 S. 

warnerri; 8/29 Bcx Neg


     -2d echo: no vegetaions seen





          ua/ wbc 10-15, nit neg, leuk +1; ucx Neg





DAVID; 


 -supratherapeutic vanco levels





-Seizure disorder.


- Hypothyroidism.


- Down syndrome.





History of PEG tube placement.


[facility]





PLAN:





Continue to monitor off abx, if more episode of fever, will start Empiric AB Rx 


          9/14 SP Meropenem #18, IV AMikacin #7


          9/4/20 SP Daptomycin #11


          9/2 SP MIcafungin #7, Linezolid #5


   8/28 SP Azithromycin #7/7


   8/26 SP Ceftriaxone #2


   8/25 SP IV Vancomycin #4, Zosyn #4


   8/22 SP Cefepime x1, Flagyl x1





- Monitor CBC, BMP.


.f/u cx


- COVID neg x3


- Monitor chest x-ray.


- Monitor the patient's clinical course and labs.  Based on those, we will do 

further recommendation.


-f/u Fungitell, asp ag, flow cytometry


-poor prognosis











Thank you, Dr. Cherry, for allowing me to participate in the care of


this patient.  I will follow the patient with you at this


hospitalization.








Discussed with RN





Subjective


Allergies:  


Coded Allergies:  


     No Known Allergies (Unverified , 1/8/19)


*late entry*





afebrile >48hrs


Fio2 100%


on pressors


leukocytosis improving





Objective





Last 24 Hour Vital Signs








  Date Time  Temp Pulse Resp B/P (MAP) Pulse Ox O2 Delivery O2 Flow Rate FiO2


 


9/22/20 11:15    128/54    


 


9/22/20 07:45    158/71    


 


9/22/20 07:08  49 26     100


 


9/22/20 06:00  53 20 147/88 (107) 99   


 


9/22/20 05:00  67 30 153/78 (103) 95   


 


9/22/20 05:00      Mechanical Ventilator  





      Mechanical Ventilator  


 


9/22/20 04:00      Mechanical Ventilator  





      Mechanical Ventilator  


 


9/22/20 04:00        100


 


9/22/20 04:00  50      


 


9/22/20 04:00 98.2 50 23 143/84 (103) 100   


 


9/22/20 03:00  50 26 146/83 (104) 100   


 


9/22/20 02:32  48 26     100


 


9/22/20 02:00  47 30 140/69 (92) 100   


 


9/22/20 01:00  51 24 160/74 (102) 99   


 


9/22/20 00:00 98.0 43 31 151/72 (98) 100   


 


9/22/20 00:00        100


 


9/22/20 00:00      Mechanical Ventilator  





      Mechanical Ventilator  


 


9/22/20 00:00  52      


 


9/21/20 23:00 97.8 44 31 165/72 (103) 100   


 


9/21/20 22:30  44 26     100


 


9/21/20 22:00  49 27 124/66 (85) 100   


 


9/21/20 21:00  54 22 132/68 (89) 100   


 


9/21/20 20:00      Mechanical Ventilator  





      Mechanical Ventilator  


 


9/21/20 20:00  56      


 


9/21/20 20:00  46 32 134/50 (78) 99   


 


9/21/20 19:25  51 26     100


 


9/21/20 19:00  46 26 140/64 (89) 100   


 


9/21/20 18:00  52 26 108/55 (72) 100   


 


9/21/20 18:00        100


 


9/21/20 17:00  60 46 122/60 (80) 100   


 


9/21/20 16:00  57      


 


9/21/20 16:00 98.9 54 28 126/79 (95) 93   


 


9/21/20 16:00        100


 


9/21/20 16:00      Mechanical Ventilator  





      Mechanical Ventilator  


 


9/21/20 15:00  58 22 125/67 (86) 88   


 


9/21/20 14:40  54 26     100


 


9/21/20 14:00  55 25 133/54 (80) 86   


 


9/21/20 13:00  59 28 125/55 (78) 92   


 


9/21/20 12:00      Mechanical Ventilator  





      Mechanical Ventilator  


 


9/21/20 12:00  71      


 


9/21/20 12:00        100


 


9/21/20 12:00 98.6 76 21 95/55 (68) 90   








Height (Feet):  5


Height (Inches):  3.00


Weight (Pounds):  172


HEENT:  No pale conjunctivae.  No icterus. ETT in place


NECK:  No lymphadenopathy.


CHEST:  Coarse breathing sounds.


HEART:  S1 and S2.


ABDOMEN:  Soft.  PEG tube in place.


EXTREMITIES:  No cyanosis at this time .


SKIN: no rash





Microbiology








 Date/Time


Source Procedure


Growth Status





 


 9/19/20 19:45


Blood Blood Culture - Preliminary


NO GROWTH AFTER 48 HOURS Resulted


 


 9/19/20 19:30


Blood Blood Culture - Preliminary


NO GROWTH AFTER 48 HOURS Resulted


 


 9/19/20 16:34


Urine,Clean Catch Urine Culture - Final


NO GROWTH AFTER 48 HOURS Complete





 9/19/20 16:34


Sputum Gram Stain - Final Resulted


 


 9/19/20 16:34 Sputum Culture - Preliminary


YEAST Resulted











Laboratory Tests








Test


 9/22/20


05:15 9/22/20


05:33 9/22/20


08:13


 


White Blood Count


 9.7 K/UL


(4.8-10.8) 


 





 


Red Blood Count


 2.89 M/UL


(4.20-5.40)  L 


 





 


Hemoglobin


 9.0 G/DL


(12.0-16.0)  L 


 





 


Hematocrit


 27.1 %


(37.0-47.0)  L 


 





 


Mean Corpuscular Volume 94 FL (80-99)    


 


Mean Corpuscular Hemoglobin


 31.3 PG


(27.0-31.0)  H 


 





 


Mean Corpuscular Hemoglobin


Concent 33.3 G/DL


(32.0-36.0) 


 





 


Red Cell Distribution Width


 18.5 %


(11.6-14.8)  H 


 





 


Platelet Count


 74 K/UL


(150-450)  L 


 





 


Mean Platelet Volume


 10.3 FL


(6.5-10.1)  H 


 





 


Neutrophils (%) (Auto)


 % (45.0-75.0)


 


 





 


Lymphocytes (%) (Auto)


 % (20.0-45.0)


 


 





 


Monocytes (%) (Auto)  % (1.0-10.0)    


 


Eosinophils (%) (Auto)  % (0.0-3.0)    


 


Basophils (%) (Auto)  % (0.0-2.0)    


 


Differential Total Cells


Counted 100  


 


 





 


Neutrophils % (Manual) 95 % (45-75)  H  


 


Lymphocytes % (Manual) 4 % (20-45)  L  


 


Monocytes % (Manual) 1 % (1-10)    


 


Eosinophils % (Manual) 0 % (0-3)    


 


Basophils % (Manual) 0 % (0-2)    


 


Band Neutrophils 0 % (0-8)    


 


Platelet Estimate Decreased  L  


 


Platelet Morphology Normal    


 


Hypochromasia 1+    


 


Anisocytosis 1+    


 


Sodium Level


 151 MMOL/L


(136-145)  H 


 





 


Potassium Level


 4.0 MMOL/L


(3.5-5.1) 


 





 


Chloride Level


 116 MMOL/L


()  H 


 





 


Carbon Dioxide Level


 27 MMOL/L


(21-32) 


 





 


Anion Gap


 8 mmol/L


(5-15) 


 





 


Blood Urea Nitrogen


 28 mg/dL


(7-18)  H 


 





 


Creatinine


 0.8 MG/DL


(0.55-1.30) 


 





 


Estimat Glomerular Filtration


Rate > 60 mL/min


(>60) 


 





 


Glucose Level


 154 MG/DL


()  H 


 





 


Calcium Level


 7.7 MG/DL


(8.5-10.1)  L 


 





 


Phosphorus Level


 3.0 MG/DL


(2.5-4.9) 


 





 


Magnesium Level


 2.1 MG/DL


(1.8-2.4) 


 





 


Total Bilirubin


 0.7 MG/DL


(0.2-1.0) 


 





 


Aspartate Amino Transf


(AST/SGOT) 35 U/L (15-37)


 


 





 


Alanine Aminotransferase


(ALT/SGPT) 43 U/L (12-78)


 


 





 


Alkaline Phosphatase


 47 U/L


() 


 





 


Total Protein


 4.6 G/DL


(6.4-8.2)  L 


 





 


Albumin


 1.2 G/DL


(3.4-5.0)  L 


 





 


Globulin 3.4 g/dL    


 


Albumin/Globulin Ratio


 0.4 (1.0-2.7)


L 


 





 


POC Whole Blood Glucose  Pending   


 


Arterial Blood pH


 


 


 7.527


(7.350-7.450)


 


Arterial Blood Partial


Pressure CO2 


 


 30.1 mmHg


(35.0-45.0)  L


 


Arterial Blood Partial


Pressure O2 


 


 128.8 mmHg


(75.0-100.0)  H


 


Arterial Blood HCO3


 


 


 24.4 mmol/L


(22.0-26.0)


 


Arterial Blood Oxygen


Saturation 


 


 97.8 %


()


 


Arterial Blood Base Excess   2 (-2-2)  


 


Cole Test   Positive  











Current Medications








 Medications


  (Trade)  Dose


 Ordered  Sig/Didi


 Route


 PRN Reason  Start Time


 Stop Time Status Last Admin


Dose Admin


 


 Acetaminophen


  (Tylenol)  650 mg  Q4H  PRN


 GT


 FEVER  8/26/20 11:45


 9/25/20 11:44  9/18/20 12:48





 


 Chlorhexidine


 Gluconate


  (Beatrice-Hex 2%)  1 applic  DAILY@2000


 TOPIC


   8/31/20 20:00


 11/29/20 19:59  9/21/20 20:00





 


 Clotrimazole


  (Lotrimin)  1 applic  Q12HR


 TOPIC


   8/30/20 13:00


 11/28/20 12:59  9/22/20 08:33





 


 Dextrose


  (Dextrose 50%)  25 ml  Q30M  PRN


 IV


 Hypoglycemia  8/26/20 11:30


 11/20/20 11:29   





 


 Dextrose


  (Dextrose 50%)  50 ml  Q30M  PRN


 IV


 Hypoglycemia  8/26/20 11:30


 11/20/20 11:29   





 


 Dopamine HCl/


 Dextrose  250 ml @ 0


 mls/hr  Q24H


 IV


   9/4/20 11:15


 12/3/20 11:14  9/6/20 19:47





 


 Hydrocortisone


  (Solu-CORTEF)  100 mg  EVERY 8  HOURS


 IV


   8/30/20 14:00


 11/28/20 13:59  9/22/20 05:36





 


 Insulin Aspart


  (NovoLOG)    Q6HR


 SUBQ


   9/18/20 12:00


 12/17/20 11:59  9/22/20 05:37





 


 Insulin Detemir


  (Levemir)  10 units  Q12HR


 SUBQ


   9/18/20 10:00


 12/17/20 09:59  9/22/20 08:59





 


 Loperamide HCl


  (Imodium)  2 mg  Q6H  PRN


 NG


 Diarrhea  9/16/20 10:30


 10/16/20 10:29   





 


 Midodrine


  (Pro-Amatine)  10 mg  Q8HR


 GT


   8/28/20 14:00


 11/26/20 13:59  9/22/20 05:36





 


 Norepinephrine


 Bitartrate 16 mg/


 Dextrose  500 ml @ 0


 mls/hr  Q24H


 IV


   8/31/20 07:45


 9/29/20 12:59  9/4/20 08:43





 


 Phenylephrine HCl


 50 mg/Dextrose  250 ml @ 0


 mls/hr  Q24H  PRN


 IV


 For hypotension  8/29/20 17:45


 9/28/20 17:44  8/31/20 01:49





 


 Potassium Chloride


  (K-Dur)  40 meq  TWICE A  DAY


 GT


   9/20/20 12:15


 12/19/20 12:14  9/22/20 08:32




















Rema Gmoes M.D.            Sep 22, 2020 11:46

## 2020-09-22 NOTE — NUR
NURSE NOTES:

RT notified that PEEP dropped to ZERO per order.  Patient appears drowsy.  Will endorse to 
PM RN.  

-------------------------------------------------------------------------------

Addendum: 09/22/20 at 1947 by Antonette Riley RN

-------------------------------------------------------------------------------

FIO2 is at 90% not 80% as previously noted.

## 2020-09-22 NOTE — PULMONOLGY CRITICAL CARE NOTE
Critical Care - Asmt/Plan


Problems:  


(1) Acute respiratory failure


(2) Bacteremia


(3) Pneumonia


(4) Sepsis


(5) HCAP (healthcare-associated pneumonia)


(6) Seizure disorder


(7) Down's syndrome


Respiratory:  adjust tidal volume, monitor respiratory rate, adjust FIO2


Cardiac:  start pressors, stop pressors, continue to monitor HR/BP


Renal:  keep IV fluid, check electrolytes


Infectious Disease:  check cultures


Gastrointestinal:  continue feedings/current rate


Endocrine:  monitor blood sugar, check HgA1C, continue sliding scale insulin


Hematologic:  transfuse if hgb<8.5


Neurologic:  PRN Ativan, keep patient comfortable


Affect:  PRN ativan


Prophylaxis:  Heparin


Notes Reviewed:  renal


Discussed with:  nurses, consultants, 





Critical Care - Objective





Last 24 Hour Vital Signs








  Date Time  Temp Pulse Resp B/P (MAP) Pulse Ox O2 Delivery O2 Flow Rate FiO2


 


9/22/20 07:45    158/71    


 


9/22/20 07:08  49 26     100


 


9/22/20 06:00  53 20 147/88 (107) 99   


 


9/22/20 05:00  67 30 153/78 (103) 95   


 


9/22/20 05:00      Mechanical Ventilator  





      Mechanical Ventilator  


 


9/22/20 04:00      Mechanical Ventilator  





      Mechanical Ventilator  


 


9/22/20 04:00        100


 


9/22/20 04:00  50      


 


9/22/20 04:00 98.2 50 23 143/84 (103) 100   


 


9/22/20 03:00  50 26 146/83 (104) 100   


 


9/22/20 02:32  48 26     100


 


9/22/20 02:00  47 30 140/69 (92) 100   


 


9/22/20 01:00  51 24 160/74 (102) 99   


 


9/22/20 00:00 98.0 43 31 151/72 (98) 100   


 


9/22/20 00:00        100


 


9/22/20 00:00      Mechanical Ventilator  





      Mechanical Ventilator  


 


9/22/20 00:00  52      


 


9/21/20 23:00 97.8 44 31 165/72 (103) 100   


 


9/21/20 22:30  44 26     100


 


9/21/20 22:00  49 27 124/66 (85) 100   


 


9/21/20 21:00  54 22 132/68 (89) 100   


 


9/21/20 20:00      Mechanical Ventilator  





      Mechanical Ventilator  


 


9/21/20 20:00  56      


 


9/21/20 20:00  46 32 134/50 (78) 99   


 


9/21/20 19:25  51 26     100


 


9/21/20 19:00  46 26 140/64 (89) 100   


 


9/21/20 18:00  52 26 108/55 (72) 100   


 


9/21/20 18:00        100


 


9/21/20 17:00  60 46 122/60 (80) 100   


 


9/21/20 16:00  57      


 


9/21/20 16:00 98.9 54 28 126/79 (95) 93   


 


9/21/20 16:00        100


 


9/21/20 16:00      Mechanical Ventilator  





      Mechanical Ventilator  


 


9/21/20 15:00  58 22 125/67 (86) 88   


 


9/21/20 14:40  54 26     100


 


9/21/20 14:00  55 25 133/54 (80) 86   


 


9/21/20 13:00  59 28 125/55 (78) 92   


 


9/21/20 12:00      Mechanical Ventilator  





      Mechanical Ventilator  


 


9/21/20 12:00  71      


 


9/21/20 12:00        100


 


9/21/20 12:00 98.6 76 21 95/55 (68) 90   


 


9/21/20 11:26  67 26     100


 


9/21/20 11:15    92/43    


 


9/21/20 11:00  72 24 92/43 (59) 99   


 


9/21/20 10:30  69 26 81/36 (51) 97   








Status:  awake, sedated


Condition:  critical, improving


HEENT:  atraumatic


Neck:  full ROM


Lungs:  clear


Heart:  HR/BP stable


Abdomen:  soft, active bowel sounds


Extremities:  edema


Decubiti:  location


Micro:





Microbiology








 Date/Time


Source Procedure


Growth Status





 


 9/19/20 19:45


Blood Blood Culture - Preliminary


NO GROWTH AFTER 48 HOURS Resulted


 


 9/19/20 19:30


Blood Blood Culture - Preliminary


NO GROWTH AFTER 48 HOURS Resulted


 


 9/19/20 16:34


Urine,Clean Catch Urine Culture - Final


NO GROWTH AFTER 48 HOURS Complete





 9/19/20 16:34


Sputum Gram Stain - Final Resulted


 


 9/19/20 16:34 Sputum Culture - Preliminary


YEAST Resulted








Accucheck:  165





Critical Care - Subjective


ROS Limited/Unobtainable:  No


Interval Events:


chest tube in place. with air leak.  pt had a tension pneumothorax yesterday.


FI02:  100


Vent Support Breath Rate:  26


Vent Support Mode:  AC


Vent Tidal Volume:  500


Sputum Amount:  Moderate


PEEP:  10.0


PIP:  38


Tube Feeding Amount:  50


I&O:











Intake and Output  


 


 9/21/20 9/22/20





 19:00 07:00


 


Intake Total 961.664 ml 950 ml


 


Output Total 895 ml 960 ml


 


Balance 66.664 ml -10 ml


 


  


 


Free Water 30 ml 300 ml


 


IV Total 481.664 ml 


 


Tube Feeding 450 ml 650 ml


 


Output Urine Total 745 ml 710 ml


 


Chest Tube Drainage Total 150 ml 250 ml


 


# Bowel Movements 3 6








CXR:


extensive infiltrate,


chest tube in place.


ET-Tube:  7.5


ET Position:  22


Labs:





Laboratory Tests








Test


 9/22/20


05:15 9/22/20


05:33 9/22/20


08:13


 


White Blood Count


 9.7 K/UL


(4.8-10.8) 


 





 


Red Blood Count


 2.89 M/UL


(4.20-5.40)  L 


 





 


Hemoglobin


 9.0 G/DL


(12.0-16.0)  L 


 





 


Hematocrit


 27.1 %


(37.0-47.0)  L 


 





 


Mean Corpuscular Volume 94 FL (80-99)    


 


Mean Corpuscular Hemoglobin


 31.3 PG


(27.0-31.0)  H 


 





 


Mean Corpuscular Hemoglobin


Concent 33.3 G/DL


(32.0-36.0) 


 





 


Red Cell Distribution Width


 18.5 %


(11.6-14.8)  H 


 





 


Platelet Count


 74 K/UL


(150-450)  L 


 





 


Mean Platelet Volume


 10.3 FL


(6.5-10.1)  H 


 





 


Neutrophils (%) (Auto)


 % (45.0-75.0)


 


 





 


Lymphocytes (%) (Auto)


 % (20.0-45.0)


 


 





 


Monocytes (%) (Auto)  % (1.0-10.0)    


 


Eosinophils (%) (Auto)  % (0.0-3.0)    


 


Basophils (%) (Auto)  % (0.0-2.0)    


 


Differential Total Cells


Counted 100  


 


 





 


Neutrophils % (Manual) 95 % (45-75)  H  


 


Lymphocytes % (Manual) 4 % (20-45)  L  


 


Monocytes % (Manual) 1 % (1-10)    


 


Eosinophils % (Manual) 0 % (0-3)    


 


Basophils % (Manual) 0 % (0-2)    


 


Band Neutrophils 0 % (0-8)    


 


Platelet Estimate Decreased  L  


 


Platelet Morphology Normal    


 


Hypochromasia 1+    


 


Anisocytosis 1+    


 


Sodium Level


 151 MMOL/L


(136-145)  H 


 





 


Potassium Level


 4.0 MMOL/L


(3.5-5.1) 


 





 


Chloride Level


 116 MMOL/L


()  H 


 





 


Carbon Dioxide Level


 27 MMOL/L


(21-32) 


 





 


Anion Gap


 8 mmol/L


(5-15) 


 





 


Blood Urea Nitrogen


 28 mg/dL


(7-18)  H 


 





 


Creatinine


 0.8 MG/DL


(0.55-1.30) 


 





 


Estimat Glomerular Filtration


Rate > 60 mL/min


(>60) 


 





 


Glucose Level


 154 MG/DL


()  H 


 





 


Calcium Level


 7.7 MG/DL


(8.5-10.1)  L 


 





 


Phosphorus Level


 3.0 MG/DL


(2.5-4.9) 


 





 


Magnesium Level


 2.1 MG/DL


(1.8-2.4) 


 





 


Total Bilirubin


 0.7 MG/DL


(0.2-1.0) 


 





 


Aspartate Amino Transf


(AST/SGOT) 35 U/L (15-37)


 


 





 


Alanine Aminotransferase


(ALT/SGPT) 43 U/L (12-78)


 


 





 


Alkaline Phosphatase


 47 U/L


() 


 





 


Total Protein


 4.6 G/DL


(6.4-8.2)  L 


 





 


Albumin


 1.2 G/DL


(3.4-5.0)  L 


 





 


Globulin 3.4 g/dL    


 


Albumin/Globulin Ratio


 0.4 (1.0-2.7)


L 


 





 


POC Whole Blood Glucose  Pending   


 


Arterial Blood pH


 


 


 7.527


(7.350-7.450)


 


Arterial Blood Partial


Pressure CO2 


 


 30.1 mmHg


(35.0-45.0)  L


 


Arterial Blood Partial


Pressure O2 


 


 128.8 mmHg


(75.0-100.0)  H


 


Arterial Blood HCO3


 


 


 24.4 mmol/L


(22.0-26.0)


 


Arterial Blood Oxygen


Saturation 


 


 97.8 %


()


 


Arterial Blood Base Excess   2 (-2-2)  


 


Cole Test   Positive  

















Chano Cherry MD              Sep 22, 2020 10:20

## 2020-09-22 NOTE — NUR
NURSE NOTES:

Patient observed bedside.  Patient is sleeping and resting comfortably.  Patient continues 
to grimace with discomfort when anywhere near her chest tube otherwise does not appear in 
any acute distress.  Patient is being monitored on the cardiac monitor with VSS.  Pt 
continues to be afebrile and no BMs.  Patient remains orally intubated with ETT 7.5 with 
vent settings: AC 26  FiO2 80% Peep 5.  Chest tube continues with air leak and 
drainage of serous fluid.  Oral care has been performed.  GT running Vital AF at 50 cc/hr.  
Patient is with a Valle catheter that is patent and draining good urine output with yellow 
urine. CARLOS PICC line that is TKO.  Pericare performed.  Safety measures are in place with 
bed locked in the lowest position, safety alarm on, side rails up x 2 with seizure 
precautions, call light within reach. Will continue to monitor and follow MD orders.

## 2020-09-22 NOTE — DIAGNOSTIC IMAGING REPORT
Indication: Dyspnea

 

Technique: One view of the chest

 

Comparison: 9/21/2020

 

Findings: In retrospect, a small pneumothorax is still visible on previous day's

exam. However, on the current study the right lung appears completely reexpanded.

Right chest tube remains in good position. Bilateral diffuse infiltrates versus edema

are again demonstrated, better demonstrated in the right lung now that is completely

reexpanded. Stable satisfactory positions of endotracheal tube and left arm PICC.

Markedly decreased subcutaneous emphysema, now largely resolved

 

Impression: Interim complete reexpansion of right lung without evidence of residual

pneumothorax

 

Bilateral diffuse and extensive infiltrates.

 

Markedly improved chest wall subcutaneous emphysema

 

Other stable findings as described

## 2020-09-22 NOTE — NUR
NURSE HAND-OFF REPORT: 



Latest Vital Signs: Temperature 98.2 , Pulse 53 , B/P 147 /88 , Respiratory Rate 20 , O2 SAT 
99 , Mechanical Ventilator, O2 Flow Rate 15.0 .  

Vital Sign Comment: 



EKG Rhythm: Sinus Bradycardia

Rhythm change?: RENU MENDENHALL Notified?: Y -Dr Mehul MENDENHALL Response: No New Orders Received



Latest Momin Fall Score: 70  

Fall Risk: High Risk 

Safety Measures: Call light Within Reach, Bed Alarm Zone 1, Side Rails Side Rails x3, Bed 
position Low and Locked.

Fall Precautions: 

Yellow Socks

Door Sign

Patient Fall Education



Report given to klarissa mccurdy  using sbar .

## 2020-09-22 NOTE — NUR
NURSE NOTES:

Dr Jorgensen assessed patient bedside.  Patient with improved SB.  Off pressors.  VSS.  Pt 
with edema however good UOP.  Will continue to monitor and carry out MD orders.

## 2020-09-22 NOTE — NUR
NURSE NOTES:

Patient was cleaned, linens changed.  Patient tolerated although continues to grimace when 
repositioned.  VSS and will continue to monitor.

## 2020-09-22 NOTE — CARDIOLOGY PROGRESS NOTE
Assessment/Plan


Assessment/Plan


sepsis


respiratory failure 


ards 


renal insuf 


bacteremia


abn cardiac enzyme due to demand 


sinus arnold stable 


thrombocytopenia resolved  


hypernatremia 


pneumothorax now s/p chest tube 











vent support 


abx 


wbc bettter 


3rd spacing alot 


cxr pers reviewed there is expansion ontherigh ptx but bilater pulm infiltrates 


has air leak 


tsh seems fine 


sig edema with hypernatremia need nauteresis eventually 


remains off pressor still at this time 


hr inthe 40's-60's no sig pauses on tele 


tele personally reviewed 


weanign to lower fio2 and peep





Subjective


ROS Limited/Unobtainable:  Yes


Subjective


on  a vent not communicative sleeeping





Objective





Last 24 Hour Vital Signs








  Date Time  Temp Pulse Resp B/P (MAP) Pulse Ox O2 Delivery O2 Flow Rate FiO2


 


9/22/20 07:45    158/71    


 


9/22/20 07:08  49 26     100


 


9/22/20 06:00  53 20 147/88 (107) 99   


 


9/22/20 05:00  67 30 153/78 (103) 95   


 


9/22/20 05:00      Mechanical Ventilator  





      Mechanical Ventilator  


 


9/22/20 04:00      Mechanical Ventilator  





      Mechanical Ventilator  


 


9/22/20 04:00        100


 


9/22/20 04:00  50      


 


9/22/20 04:00 98.2 50 23 143/84 (103) 100   


 


9/22/20 03:00  50 26 146/83 (104) 100   


 


9/22/20 02:32  48 26     100


 


9/22/20 02:00  47 30 140/69 (92) 100   


 


9/22/20 01:00  51 24 160/74 (102) 99   


 


9/22/20 00:00 98.0 43 31 151/72 (98) 100   


 


9/22/20 00:00        100


 


9/22/20 00:00      Mechanical Ventilator  





      Mechanical Ventilator  


 


9/22/20 00:00  52      


 


9/21/20 23:00 97.8 44 31 165/72 (103) 100   


 


9/21/20 22:30  44 26     100


 


9/21/20 22:00  49 27 124/66 (85) 100   


 


9/21/20 21:00  54 22 132/68 (89) 100   


 


9/21/20 20:00      Mechanical Ventilator  





      Mechanical Ventilator  


 


9/21/20 20:00  56      


 


9/21/20 20:00  46 32 134/50 (78) 99   


 


9/21/20 19:25  51 26     100


 


9/21/20 19:00  46 26 140/64 (89) 100   


 


9/21/20 18:00  52 26 108/55 (72) 100   


 


9/21/20 18:00        100


 


9/21/20 17:00  60 46 122/60 (80) 100   


 


9/21/20 16:00  57      


 


9/21/20 16:00 98.9 54 28 126/79 (95) 93   


 


9/21/20 16:00        100


 


9/21/20 16:00      Mechanical Ventilator  





      Mechanical Ventilator  


 


9/21/20 15:00  58 22 125/67 (86) 88   


 


9/21/20 14:40  54 26     100


 


9/21/20 14:00  55 25 133/54 (80) 86   


 


9/21/20 13:00  59 28 125/55 (78) 92   


 


9/21/20 12:00      Mechanical Ventilator  





      Mechanical Ventilator  


 


9/21/20 12:00  71      


 


9/21/20 12:00        100


 


9/21/20 12:00 98.6 76 21 95/55 (68) 90   








General Appearance:  no apparent distress, alert, on vent, patient on isolation,

isolation precautions


Neck:  supple


Cardiovascular:  normal rate


Respiratory/Chest:  crackles/rales, other - resolved ride sided crepitation on 

eunice rihgtr chest wall


Abdomen:  normal bowel sounds, non tender, soft


Extremities:  moderate edema











Intake and Output  


 


 9/21/20 9/22/20





 19:00 07:00


 


Intake Total 961.664 ml 950 ml


 


Output Total 895 ml 960 ml


 


Balance 66.664 ml -10 ml


 


  


 


Free Water 30 ml 300 ml


 


IV Total 481.664 ml 


 


Tube Feeding 450 ml 650 ml


 


Output Urine Total 745 ml 710 ml


 


Chest Tube Drainage Total 150 ml 250 ml


 


# Bowel Movements 3 6











Laboratory Tests








Test


 9/22/20


05:15 9/22/20


05:33 9/22/20


08:13


 


White Blood Count


 9.7 K/UL


(4.8-10.8) 


 





 


Red Blood Count


 2.89 M/UL


(4.20-5.40)  L 


 





 


Hemoglobin


 9.0 G/DL


(12.0-16.0)  L 


 





 


Hematocrit


 27.1 %


(37.0-47.0)  L 


 





 


Mean Corpuscular Volume 94 FL (80-99)    


 


Mean Corpuscular Hemoglobin


 31.3 PG


(27.0-31.0)  H 


 





 


Mean Corpuscular Hemoglobin


Concent 33.3 G/DL


(32.0-36.0) 


 





 


Red Cell Distribution Width


 18.5 %


(11.6-14.8)  H 


 





 


Platelet Count


 74 K/UL


(150-450)  L 


 





 


Mean Platelet Volume


 10.3 FL


(6.5-10.1)  H 


 





 


Neutrophils (%) (Auto)


 % (45.0-75.0)


 


 





 


Lymphocytes (%) (Auto)


 % (20.0-45.0)


 


 





 


Monocytes (%) (Auto)  % (1.0-10.0)    


 


Eosinophils (%) (Auto)  % (0.0-3.0)    


 


Basophils (%) (Auto)  % (0.0-2.0)    


 


Differential Total Cells


Counted 100  


 


 





 


Neutrophils % (Manual) 95 % (45-75)  H  


 


Lymphocytes % (Manual) 4 % (20-45)  L  


 


Monocytes % (Manual) 1 % (1-10)    


 


Eosinophils % (Manual) 0 % (0-3)    


 


Basophils % (Manual) 0 % (0-2)    


 


Band Neutrophils 0 % (0-8)    


 


Platelet Estimate Decreased  L  


 


Platelet Morphology Normal    


 


Hypochromasia 1+    


 


Anisocytosis 1+    


 


Sodium Level


 151 MMOL/L


(136-145)  H 


 





 


Potassium Level


 4.0 MMOL/L


(3.5-5.1) 


 





 


Chloride Level


 116 MMOL/L


()  H 


 





 


Carbon Dioxide Level


 27 MMOL/L


(21-32) 


 





 


Anion Gap


 8 mmol/L


(5-15) 


 





 


Blood Urea Nitrogen


 28 mg/dL


(7-18)  H 


 





 


Creatinine


 0.8 MG/DL


(0.55-1.30) 


 





 


Estimat Glomerular Filtration


Rate > 60 mL/min


(>60) 


 





 


Glucose Level


 154 MG/DL


()  H 


 





 


Calcium Level


 7.7 MG/DL


(8.5-10.1)  L 


 





 


Phosphorus Level


 3.0 MG/DL


(2.5-4.9) 


 





 


Magnesium Level


 2.1 MG/DL


(1.8-2.4) 


 





 


Total Bilirubin


 0.7 MG/DL


(0.2-1.0) 


 





 


Aspartate Amino Transf


(AST/SGOT) 35 U/L (15-37)


 


 





 


Alanine Aminotransferase


(ALT/SGPT) 43 U/L (12-78)


 


 





 


Alkaline Phosphatase


 47 U/L


() 


 





 


Total Protein


 4.6 G/DL


(6.4-8.2)  L 


 





 


Albumin


 1.2 G/DL


(3.4-5.0)  L 


 





 


Globulin 3.4 g/dL    


 


Albumin/Globulin Ratio


 0.4 (1.0-2.7)


L 


 





 


POC Whole Blood Glucose  Pending   


 


Arterial Blood pH


 


 


 7.527


(7.350-7.450)


 


Arterial Blood Partial


Pressure CO2 


 


 30.1 mmHg


(35.0-45.0)  L


 


Arterial Blood Partial


Pressure O2 


 


 128.8 mmHg


(75.0-100.0)  H


 


Arterial Blood HCO3


 


 


 24.4 mmol/L


(22.0-26.0)


 


Arterial Blood Oxygen


Saturation 


 


 97.8 %


()


 


Arterial Blood Base Excess   2 (-2-2)  


 


Cole Test   Positive  











Microbiology








 Date/Time


Source Procedure


Growth Status





 


 9/19/20 19:45


Blood Blood Culture - Preliminary


NO GROWTH AFTER 48 HOURS Resulted


 


 9/19/20 19:30


Blood Blood Culture - Preliminary


NO GROWTH AFTER 48 HOURS Resulted


 


 9/19/20 16:34


Urine,Clean Catch Urine Culture - Final


NO GROWTH AFTER 48 HOURS Complete





 9/19/20 16:34


Sputum Gram Stain - Final Resulted


 


 9/19/20 16:34 Sputum Culture - Preliminary


YEAST Resulted

















Constantine Jorgensen MD          Sep 22, 2020 11:31

## 2020-09-22 NOTE — NUR
NURSE NOTES:

Pt received from LAYTON Brian. Patient observed bedside.  Patient opens eyes spontaneously, 
tracks to voice however does not follow commands.  Patient is responsive to both tactile and 
painful stimuli.  Patient does not appear to be in any acute distress however when assessing 
close to the chest tube site, patient did grimace and wince as if she experienced 
discomfort.  Patient is being monitored on the cardiac monitor.  VS with HR 42 RR 26 SPO2 
99% with /70.  Pt is afebrile at this time. Patient is orally intubated with ETT 20 cm 
at the lip 7.5 with vent settings: AC 26  FiO2 100% Peep 10.  Upon auscultation, 
patient is noted to have diminished lung sounds on the L.  Patient is with chest tube on the 
R side with what appears to be an air leak draining serous fluid.  Oral care has been 
performed.  Patient has a GT running  Vital at 50 cc/gr with only 2 ml residuals, a 50 ml 
flush was given.  Bowel sounds are hypoactive in all four quadrants.  Patient is with a 
Valle catheter that is patent and draining good urine output with yellow urine. Patient has 
a CARLOS PICC line that is TKO.  Patient has BL heel DTI with dressings that are dry and 
intact. Pt is with 2+ edema.  Safety measures are in place with bed locked in the lowest 
position, safety alarm on, side rails up x 2 with seizure precautions, call light within 
reach. Will continue to monitor and follow MD orders.

## 2020-09-22 NOTE — NUR
NURSE NOTES:

Dr Knox assessed patient bedside.  Noted was patient has an airleak in the chest tube.  MD 
orders are to titrate PEEP to ZERO.  PEEP was at start of 10>8>5.  Goal of ZERO.  Patient is 
tolerating.  Also to note, patient is no longer SB.  If patient's airleak does not improve, 
MD would like repeat CT Chest.  Will continue to closely monitor and follow MD orders.

## 2020-09-22 NOTE — GENERAL PROGRESS NOTE
Subjective


ROS Limited/Unobtainable:  No


Allergies:  


Coded Allergies:  


     No Known Allergies (Unverified , 1/8/19)





Objective





Last 24 Hour Vital Signs








  Date Time  Temp Pulse Resp B/P (MAP) Pulse Ox O2 Delivery O2 Flow Rate FiO2


 


9/22/20 07:45    158/71    


 


9/22/20 07:08  49 26     100


 


9/22/20 06:00  53 20 147/88 (107) 99   


 


9/22/20 05:00  67 30 153/78 (103) 95   


 


9/22/20 05:00      Mechanical Ventilator  





      Mechanical Ventilator  


 


9/22/20 04:00      Mechanical Ventilator  





      Mechanical Ventilator  


 


9/22/20 04:00        100


 


9/22/20 04:00  50      


 


9/22/20 04:00 98.2 50 23 143/84 (103) 100   


 


9/22/20 03:00  50 26 146/83 (104) 100   


 


9/22/20 02:32  48 26     100


 


9/22/20 02:00  47 30 140/69 (92) 100   


 


9/22/20 01:00  51 24 160/74 (102) 99   


 


9/22/20 00:00 98.0 43 31 151/72 (98) 100   


 


9/22/20 00:00        100


 


9/22/20 00:00      Mechanical Ventilator  





      Mechanical Ventilator  


 


9/22/20 00:00  52      


 


9/21/20 23:00 97.8 44 31 165/72 (103) 100   


 


9/21/20 22:30  44 26     100


 


9/21/20 22:00  49 27 124/66 (85) 100   


 


9/21/20 21:00  54 22 132/68 (89) 100   


 


9/21/20 20:00      Mechanical Ventilator  





      Mechanical Ventilator  


 


9/21/20 20:00  56      


 


9/21/20 20:00  46 32 134/50 (78) 99   


 


9/21/20 19:25  51 26     100


 


9/21/20 19:00  46 26 140/64 (89) 100   


 


9/21/20 18:00  52 26 108/55 (72) 100   


 


9/21/20 18:00        100


 


9/21/20 17:00  60 46 122/60 (80) 100   


 


9/21/20 16:00  57      


 


9/21/20 16:00 98.9 54 28 126/79 (95) 93   


 


9/21/20 16:00        100


 


9/21/20 16:00      Mechanical Ventilator  





      Mechanical Ventilator  


 


9/21/20 15:00  58 22 125/67 (86) 88   


 


9/21/20 14:40  54 26     100


 


9/21/20 14:00  55 25 133/54 (80) 86   


 


9/21/20 13:00  59 28 125/55 (78) 92   


 


9/21/20 12:00      Mechanical Ventilator  





      Mechanical Ventilator  


 


9/21/20 12:00  71      


 


9/21/20 12:00        100


 


9/21/20 12:00 98.6 76 21 95/55 (68) 90   


 


9/21/20 11:26  67 26     100


 


9/21/20 11:15    92/43    


 


9/21/20 11:00  72 24 92/43 (59) 99   


 


9/21/20 10:30  69 26 81/36 (51) 97   

















Intake and Output  


 


 9/21/20 9/22/20





 19:00 07:00


 


Intake Total 961.664 ml 950 ml


 


Output Total 895 ml 960 ml


 


Balance 66.664 ml -10 ml


 


  


 


Free Water 30 ml 300 ml


 


IV Total 481.664 ml 


 


Tube Feeding 450 ml 650 ml


 


Output Urine Total 745 ml 710 ml


 


Chest Tube Drainage Total 150 ml 250 ml


 


# Bowel Movements 3 6








Laboratory Tests


9/22/20 05:15: 


White Blood Count 9.7, Red Blood Count 2.89L, Hemoglobin 9.0L, Hematocrit 27.1L,

Mean Corpuscular Volume 94, Mean Corpuscular Hemoglobin 31.3H, Mean Corpuscular 

Hemoglobin Concent 33.3, Red Cell Distribution Width 18.5H, Platelet Count 74L, 

Mean Platelet Volume 10.3H, Neutrophils (%) (Auto) , Lymphocytes (%) (Auto) , 

Monocytes (%) (Auto) , Eosinophils (%) (Auto) , Basophils (%) (Auto) , 

Differential Total Cells Counted 100, Neutrophils % (Manual) 95H, Lymphocytes % 

(Manual) 4L, Monocytes % (Manual) 1, Eosinophils % (Manual) 0, Basophils % 

(Manual) 0, Band Neutrophils 0, Platelet Estimate DecreasedL, Platelet 

Morphology Normal, Hypochromasia 1+, Anisocytosis 1+, Sodium Level 151H, 

Potassium Level 4.0, Chloride Level 116H, Carbon Dioxide Level 27, Anion Gap 8, 

Blood Urea Nitrogen 28H, Creatinine 0.8, Estimat Glomerular Filtration Rate > 

60, Glucose Level 154H, Calcium Level 7.7L, Phosphorus Level 3.0, Magnesium 

Level 2.1, Total Bilirubin 0.7, Aspartate Amino Transf (AST/SGOT) 35, Alanine 

Aminotransferase (ALT/SGPT) 43, Alkaline Phosphatase 47, Total Protein 4.6L, 

Albumin 1.2L, Globulin 3.4, Albumin/Globulin Ratio 0.4L


9/22/20 05:33: POC Whole Blood Glucose [Pending]


9/22/20 08:13: 


Arterial Blood pH 7.527H, Arterial Blood Partial Pressure CO2 30.1L, Arterial 

Blood Partial Pressure O2 128.8H, Arterial Blood HCO3 24.4, Arterial Blood 

Oxygen Saturation 97.8, Arterial Blood Base Excess 2, Cole Test Positive


Height (Feet):  5


Height (Inches):  3.00


Weight (Pounds):  172


General Appearance:  no apparent distress


EENT:  normal ENT inspection


Neck:  supple


Cardiovascular:  normal rate


Respiratory/Chest:  decreased breath sounds


Abdomen:  normal bowel sounds, non tender, soft


Extremities:  non-tender





Assessment/Plan


Status:  stable, not improved, unchanged


Assessment/Plan:


1. History of Down syndrome.


2. Dysphagia with G-tube.


3. Seizure disorder.


4. Hypothyroidism.


5. DAVID.


6. Pneumonia.


7. Sepsis.








fu H&H


prn blood transfusion to keep HGB above 7


ppi


GTF


hold GI procedures for now


stool ob + 1/2











Ted Nagy MD             Sep 22, 2020 10:40

## 2020-09-22 NOTE — NUR
NURSE NOTES:

received report from emiliano rn pt orally intubated-vent o2 sat 100% no acute resp 
distress note tolerating tube feeding chest tube to suction with yellow color drainage 
reposition and suction  iv infusing well site good reposition and suction

## 2020-09-22 NOTE — NUR
NURSE NOTES:

Patient observed bedside.  Patient continues to be alert, and awake however resting 
comfortably.  Patient responds to care appropriately.  Patient does grimace with discomfort 
when anywhere near her chest tube otherwise does not appear in any acute distress.  Patient 
is being monitored on the cardiac monitor with VSS.  Pt continues to be afebrile and no BMs. 
 Patient remains orally intubated with ETT 7.5 with vent settings: AC 26  FiO2 80% 
Peep 5.  Chest tube continues with air leak and drainage of serous fluid.  Oral care has 
been performed.  GT running Vital AF at 50 cc/hr.  Patient is with a Valle catheter that is 
patent and draining good urine output with yellow urine. CARLOS PICC line that is TKO.  Patient 
has BL heel DTI with dressings that are dry and intact. Pt is with 2+ edema.  Safety 
measures are in place with bed locked in the lowest position, safety alarm on, side rails up 
x 2 with seizure precautions, call light within reach. Will continue to monitor and follow 
MD orders

## 2020-09-22 NOTE — CONSULTATION
DATE OF CONSULTATION:  09/22/2020

SURGEON:  Conrad Knox MD., specialty in Thoracic surgery.



REFERRING PHYSICIAN:  Chano Cherry MD.



HISTORY OF PRESENT ILLNESS:  The patient is a 60-year-old resident of a

skilled nursing facility who presented to [facility] with

shortness of breath.  Over the course of her hospitalization, the patient

underwent respiratory decompensation requiring mechanical ventilation.

Recent chest x-ray demonstrated right-sided tension pneumothorax for which

a chest tube was placed.  Thoracic surgery was then consulted for further

evaluation.



PAST MEDICAL HISTORY:  Notable for:



1. Down syndrome.

2. Seizure disorder.

3. Hypothyroidism.

4. Dysphagia.



PAST SURGICAL HISTORY:  Notable for PEG placement.



MEDICATIONS:  Reviewed.



ALLERGIES:  The patient has no known drug allergies.



FAMILY/SOCIAL HISTORY:  Noncontributory.



PHYSICAL EXAMINATION:

VITAL SIGNS:  She is noted to be afebrile.  Her vitals are within normal

limits.

CARDIAC:  Regular rate and rhythm.  No gallops.  No murmur.

RESPIRATORY:  Clear to auscultation on the left with _____ crackles on the

right.  There is a chest tube in place with a continuous air leak.

ABDOMEN:  Soft, nondistended, nontender with normoactive bowel sounds.

EXTREMITIES:  No evidence of cyanosis, clubbing, or edema.



LABORATORY AND DIAGNOSTIC DATA:  Laboratory studies performed on September 22, 2020 showed WBC of 9.7, hemoglobin 9, hematocrit 27, and platelet

count of 74.  Chemistry sodium is 151, potassium 4.0, chloride 116, bicarb

27, BUN is 20, creatinine 0.8, and glucose is 60.  A chest x-ray performed

on September 21, 2020 demonstrated a right-sided pneumothorax.  Chest

x-ray performed on September 22, 2020 demonstrated a placement of large

bore chest tube in the right hemithorax with a resolution of the

pneumothorax.



ASSESSMENT/PLAN:  This is a 60-year-old  female with Down

syndrome, who presented with a right-sided tension pneumothorax requiring

chest tube decompression.  The patient was evaluated at bedside.  After

reviewing her clinical database, I recommend to turn down the PEEP to a

minimal level.  If the patient  continues to have air leak, suggestive of 
underlying

bronchopleural fistula, then a noncontrast chest CT scan

should be ordered.  At the present time, I recommend the chest tube to be

placed on continuous suction to prevent subcutaneous emphysema.



I want to thank you for referring this patient to my attention.  If any

questions in regards to this patient's clinical care, please do not

hesitate to contact me.









  ______________________________________________

  Harris M.D.





DR:  Jesús

D:  09/22/2020 10:37

T:  09/22/2020 11:03

JOB#:  [id number]

CC:



KIN

## 2020-09-22 NOTE — NEPHROLOGY PROGRESS NOTE
Assessment/Plan


Problem List:  


(1) DAVID (acute kidney injury)


(2) Respiratory failure requiring intubation


(3) Down's syndrome


(4) Seizure disorder


(5) Hypothyroidism


Assessment





Acute renal failure, likely due to hypotension


Acute respiratory distress, hypoxia


Seizure disorder


Hypothyroidism


Down syndrome


Full code











Fluid challenge with IV fluids and albumin


Midodrine for BP above 100 systolic


Check TSH level


Check


Correct level


Monitor renal parameters


Urine studies


Per orders


Plan


September 22: Labs reviewed.  Renal parameters stable.  Remains full code.  

Remains on ventilator.  Due for tracheostomy tomorrow.  Continue per 

consultants.  Patient has a right chest tube in place at this time.


September 21: Labs reviewed.  Electrolyte imbalances addressed and supplemented.

 Remains full code and on ventilator.  Continue to monitor renal parameters.  

Continue per consultants.


September 20: Labs reviewed.  Serum potassium again low today.  Potassium 

supplement IV and through GT given.  Patient remains full code and is on 

ventilator.  Continue per consultants.  Continue to monitor electrolytes and 

renal parameters.


September 19: Labs reviewed.  Abnormal electrolyte addressed.  Remains full 

code.  Remains vented.


September 18: Day 27 of hospitalization.  Full code.  Labs reviewed.  Hemoglobin

down to 7.5.  Electrolyte abnormalities addressed and corrections ordered.  

Continue to monitor renal parameters.  Continue per consultants.  Start on 

Levemir for blood sugar management.  Questioning continuation of hydrocortisone?


September 17: Labs reviewed.  Potassium, phosphorus, hemoglobin, are all low.  

Potassium and phosphorus IV replacement given.  Continue to monitor electrolytes

and CBC.  Patient remains full code.


September 16: Lab reviewed.  Low phosphorus low magnesium and low potassium was 

addressed.  Hemoglobin drifting lower.  Continue per consultants.  Patient 

remains full code.


September 15: Labs reviewed.  Patient continues to be on ventilator.  D5W for 

high sodium and also potassium chloride intravenously as supplement given.  

Hemoglobin 8.4.  Continue to monitor electrolytes and renal parameters.


September 14: Labs reviewed.  Low potassium and high sodium noted.  Hemoglobin 

8.1 stable.  Aim to correct abnormal electrolyte.  Continue rest.  Will give 2 

boluses of D5W 500 cc.


September 13: Lab reviewed.  Abnormal electrolytes noted and addressed.


September 12: Labs reviewed.  Potassium supplement given.  Patient remains full 

code.  Continue per consultants.


September 11: Lab reviewed.  Electrolyte abnormalities addressed.  Continue per 

pulmonary and ID.


September 10: Lab reviewed.  Status unchanged.  Serum sodium 151 unchanged.  

Stable from renal standpoint of view.


September 9: Labs reviewed.  Status quo.  D5W 500 cc IV ordered.  Continue to 

monitor renal parameters.


September 8: Status quo.  Labs reviewed.  Overall condition unchanged.  Patient 

was transfused and hemoglobin higher.  Continue current management.  Patient 

remains full code.


September 7: Status quo.  Overall condition poor.  Very low albumin.  Edematous.

 Hypotensive.  Hemoglobin lower.  Anemia work-up ordered.  I favor transfusion 2

units of packed RBCs.  Patient remains full code.  I favor supportive care only.

 Will discuss.


September 6: Electrolyte abnormalities addressed.  Serum creatinine lower.  

Continue per current management.


September 5: Status unchanged.  Lab reviewed.  Serum potassium 2.7.  IV 

potassium chloride ordered.  Serum creatinine low at 1.6 stable.  Blood pressure

90s systolic


September 4: Status quo.  Labs reviewed.  Renal parameters stable.  Serum 

creatinine down to 1.6.  Medication list reviewed.  Continues to be on 

midodrine.  Continue per consultants.


September 3: Status quo.  Labs reviewed.  Electrolytes adjusted.  Serum 

creatinine down to 1.8.  Continue per consultants.


September 2: Status quo.  Labs reviewed.  Phosphorus supplement IV given.  Serum

creatinine 2.  Continue per consultants.


September 1: Requires less pressors.  Albumin bolus given.  1 dose of Lasix IV 

ordered as the patient severely edematous.  Patient serum albumin is very low.  

Continue per consultants.


August 31: Continues to be intubated.  Labs reviewed.  Serum creatinine 1.9 

unchanged.  Blood pressure more stable.  Off 1 of the pressors.  Continue to 

monitor renal parameters.  Continue per consultants.  Patient now on 

hydrocortisone 100 mg every 8 hours.  Will decrease IV fluid.  Normal saline 

down to 50 cc an hour.


August 30: Intubated.  Labs reviewed.  Creatinine 1.9 unchanged.  Continue same 

treatment plan.  Per consultants.  Overall poor prognosis since the patient 

remains on pressors and her pulmonary status is worsening.


August 29: Remains intubated.  Labs reviewed.  Creatinine 1.9.  Blood pressure 

systolic 90s.  Continue per consultants.


August 28: Remains intubated.  Labs reviewed.  Serum creatinine lower to 2.  

Vancomycin level lower.  Remains hypotensive on pressors.  Will increase 

midodrine to 10 mg every 8 hours.  Continue per consultants.  Continue to 

monitor renal parameters.


August 27: Patient now in ICU.  Intubated.  On pressors.  Labs reviewed.  Will 

increase midodrine.  Aim to keep blood pressure over 100 systolic.  Will give 

albumin bolus.  Will check vancomycin level which was elevated when checked 

previously on August 24.  Will monitor renal parameters.  Continue per 

consultants.





Subjective


ROS Limited/Unobtainable:  Yes





Objective


Objective





Last 24 Hour Vital Signs








  Date Time  Temp Pulse Resp B/P (MAP) Pulse Ox O2 Delivery O2 Flow Rate FiO2


 


9/22/20 12:00  57      


 


9/22/20 12:00      Mechanical Ventilator  





      Mechanical Ventilator  


 


9/22/20 12:00 97.0 52 26 130/60 (83) 98   


 


9/22/20 11:32  61 26     90


 


9/22/20 11:15    128/54    


 


9/22/20 11:00  55 26 128/54 (78) 97   


 


9/22/20 10:00  54 26 139/55 (83) 98   


 


9/22/20 09:00  55 25 132/59 (83) 98   


 


9/22/20 09:00        80


 


9/22/20 08:15        90


 


9/22/20 08:00      Mechanical Ventilator  





      Mechanical Ventilator  


 


9/22/20 08:00        80


 


9/22/20 08:00 98.4 53 25 158/71 (100) 99   


 


9/22/20 07:45    158/71    


 


9/22/20 07:08  49 26     100


 


9/22/20 07:00  42 26 182/88 (119) 99   


 


9/22/20 06:00  53 20 147/88 (107) 99   


 


9/22/20 05:00  67 30 153/78 (103) 95   


 


9/22/20 05:00      Mechanical Ventilator  





      Mechanical Ventilator  


 


9/22/20 04:00      Mechanical Ventilator  





      Mechanical Ventilator  


 


9/22/20 04:00        100


 


9/22/20 04:00  50      


 


9/22/20 04:00 98.2 50 23 143/84 (103) 100   


 


9/22/20 03:00  50 26 146/83 (104) 100   


 


9/22/20 02:32  48 26     100


 


9/22/20 02:00  47 30 140/69 (92) 100   


 


9/22/20 01:00  51 24 160/74 (102) 99   


 


9/22/20 00:00 98.0 43 31 151/72 (98) 100   


 


9/22/20 00:00        100


 


9/22/20 00:00      Mechanical Ventilator  





      Mechanical Ventilator  


 


9/22/20 00:00  52      


 


9/21/20 23:00 97.8 44 31 165/72 (103) 100   


 


9/21/20 22:30  44 26     100


 


9/21/20 22:00  49 27 124/66 (85) 100   


 


9/21/20 21:00  54 22 132/68 (89) 100   


 


9/21/20 20:00      Mechanical Ventilator  





      Mechanical Ventilator  


 


9/21/20 20:00  56      


 


9/21/20 20:00  46 32 134/50 (78) 99   


 


9/21/20 19:25  51 26     100


 


9/21/20 19:00  46 26 140/64 (89) 100   


 


9/21/20 18:00  52 26 108/55 (72) 100   


 


9/21/20 18:00        100


 


9/21/20 17:00  60 46 122/60 (80) 100   


 


9/21/20 16:00  57      


 


9/21/20 16:00 98.9 54 28 126/79 (95) 93   


 


9/21/20 16:00        100


 


9/21/20 16:00      Mechanical Ventilator  





      Mechanical Ventilator  

















Intake and Output  


 


 9/21/20 9/22/20





 19:00 07:00


 


Intake Total 961.664 ml 950 ml


 


Output Total 895 ml 960 ml


 


Balance 66.664 ml -10 ml


 


  


 


Free Water 30 ml 300 ml


 


IV Total 481.664 ml 


 


Tube Feeding 450 ml 650 ml


 


Output Urine Total 745 ml 710 ml


 


Chest Tube Drainage Total 150 ml 250 ml


 


# Bowel Movements 3 6








Laboratory Tests


9/22/20 00:25: POC Whole Blood Glucose [Pending]


9/22/20 05:15: 


White Blood Count 9.7, Red Blood Count 2.89L, Hemoglobin 9.0L, Hematocrit 27.1L,

 Mean Corpuscular Volume 94, Mean Corpuscular Hemoglobin 31.3H, Mean Corpuscular

 Hemoglobin Concent 33.3, Red Cell Distribution Width 18.5H, Platelet Count 74L,

 Mean Platelet Volume 10.3H, Neutrophils (%) (Auto) , Lymphocytes (%) (Auto) , 

Monocytes (%) (Auto) , Eosinophils (%) (Auto) , Basophils (%) (Auto) , 

Differential Total Cells Counted 100, Neutrophils % (Manual) 95H, Lymphocytes % 

(Manual) 4L, Monocytes % (Manual) 1, Eosinophils % (Manual) 0, Basophils % 

(Manual) 0, Band Neutrophils 0, Platelet Estimate DecreasedL, Platelet 

Morphology Normal, Hypochromasia 1+, Anisocytosis 1+, Sodium Level 151H, 

Potassium Level 4.0, Chloride Level 116H, Carbon Dioxide Level 27, Anion Gap 8, 

Blood Urea Nitrogen 28H, Creatinine 0.8, Estimat Glomerular Filtration Rate > 

60, Glucose Level 154H, Calcium Level 7.7L, Phosphorus Level 3.0, Magnesium 

Level 2.1, Total Bilirubin 0.7, Aspartate Amino Transf (AST/SGOT) 35, Alanine 

Aminotransferase (ALT/SGPT) 43, Alkaline Phosphatase 47, Total Protein 4.6L, 

Albumin 1.2L, Globulin 3.4, Albumin/Globulin Ratio 0.4L


9/22/20 05:33: POC Whole Blood Glucose [Pending]


9/22/20 08:13: 


Arterial Blood pH 7.527H, Arterial Blood Partial Pressure CO2 30.1L, Arterial 

Blood Partial Pressure O2 128.8H, Arterial Blood HCO3 24.4, Arterial Blood 

Oxygen Saturation 97.8, Arterial Blood Base Excess 2, Cole Test Positive


Height (Feet):  5


Height (Inches):  3.00


Weight (Pounds):  172


General Appearance:  no apparent distress


EENT:  other - Continue to be intubated on ventilator


Cardiovascular:  normal rate


Respiratory/Chest:  decreased breath sounds - Rate mid 50s


Abdomen:  distended











Lam Nino MD            Sep 22, 2020 15:13

## 2020-09-22 NOTE — NUR
HAND-OFF: 

Report given to LAYTON Brian. Endorsed to RN that patient is with airleak in the chest tube 
and now at PEEP at ZERO.  If airleak without improvement, patient will need CT Chest 
tomorrow.  Pt with improved SB.  Pt is more drowsy this early evening.  VSS.  Patient does 
not appear in any acute distress.  Chest tube output is serous.

## 2020-09-22 NOTE — INTERNAL MED PROGRESS NOTE
Subjective


Date of Service:  Sep 22, 2020


Physician Name


Sanya Schultz


Attending Physician


Chano Cherry MD





Current Medications








 Medications


  (Trade)  Dose


 Ordered  Sig/Didi


 Route


 PRN Reason  Start Time


 Stop Time Status Last Admin


Dose Admin


 


 Acetaminophen


  (Tylenol)  650 mg  Q4H  PRN


 GT


 FEVER  8/26/20 11:45


 9/25/20 11:44  9/18/20 12:48





 


 Chlorhexidine


 Gluconate


  (Beatrice-Hex 2%)  1 applic  DAILY@2000


 TOPIC


   8/31/20 20:00


 11/29/20 19:59  9/21/20 20:00





 


 Clotrimazole


  (Lotrimin)  1 applic  Q12HR


 TOPIC


   8/30/20 13:00


 11/28/20 12:59  9/22/20 08:33





 


 Dextrose


  (Dextrose 50%)  25 ml  Q30M  PRN


 IV


 Hypoglycemia  8/26/20 11:30


 11/20/20 11:29   





 


 Dextrose


  (Dextrose 50%)  50 ml  Q30M  PRN


 IV


 Hypoglycemia  8/26/20 11:30


 11/20/20 11:29   





 


 Dopamine HCl/


 Dextrose  250 ml @ 0


 mls/hr  Q24H


 IV


   9/4/20 11:15


 9/25/20 23:59  9/6/20 19:47





 


 Hydrocortisone


  (Solu-CORTEF)  100 mg  EVERY 8  HOURS


 IV


   8/30/20 14:00


 11/28/20 13:59  9/22/20 14:10





 


 Insulin Aspart


  (NovoLOG)    Q6HR


 SUBQ


   9/18/20 12:00


 12/17/20 11:59  9/22/20 05:37





 


 Insulin Detemir


  (Levemir)  10 units  Q12HR


 SUBQ


   9/18/20 10:00


 12/17/20 09:59  9/22/20 08:59





 


 Loperamide HCl


  (Imodium)  2 mg  Q6H  PRN


 NG


 Diarrhea  9/16/20 10:30


 10/16/20 10:29   





 


 Midodrine


  (Pro-Amatine)  10 mg  Q8HR


 GT


   8/28/20 14:00


 11/26/20 13:59  9/22/20 05:36





 


 Norepinephrine


 Bitartrate 16 mg/


 Dextrose  500 ml @ 0


 mls/hr  Q24H


 IV


   8/31/20 07:45


 9/25/20 23:59  9/4/20 08:43





 


 Phenylephrine HCl


 50 mg/Dextrose  250 ml @ 0


 mls/hr  Q24H  PRN


 IV


 For hypotension  8/29/20 17:45


 9/25/20 23:59  8/31/20 01:49





 


 Potassium Chloride


  (K-Dur)  40 meq  TWICE A  DAY


 GT


   9/20/20 12:15


 12/19/20 12:14  9/22/20 08:32











Allergies:  


Coded Allergies:  


     No Known Allergies (Unverified , 1/8/19)


ROS Limited/Unobtainable:  Yes


Subjective


60 YO F with Down's syndrome admitted with hypoxia.  Now sepsis and pneumonia.  

Cover for Int Phi-DR Hung.  ICU.  Intubated and sedated





Objective





Last Vital Signs








  Date Time  Temp Pulse Resp B/P (MAP) Pulse Ox O2 Delivery O2 Flow Rate FiO2


 


9/22/20 15:06  66 26     90


 


9/22/20 12:00      Mechanical Ventilator  





      Mechanical Ventilator  


 


9/22/20 12:00 97.0   130/60 (83) 98   











Laboratory Tests








Test


 9/22/20


00:25 9/22/20


05:15 9/22/20


05:33 9/22/20


08:13


 


POC Whole Blood Glucose Pending    Pending   


 


White Blood Count


 


 9.7 K/UL


(4.8-10.8) 


 





 


Red Blood Count


 


 2.89 M/UL


(4.20-5.40)  L 


 





 


Hemoglobin


 


 9.0 G/DL


(12.0-16.0)  L 


 





 


Hematocrit


 


 27.1 %


(37.0-47.0)  L 


 





 


Mean Corpuscular Volume  94 FL (80-99)    


 


Mean Corpuscular Hemoglobin


 


 31.3 PG


(27.0-31.0)  H 


 





 


Mean Corpuscular Hemoglobin


Concent 


 33.3 G/DL


(32.0-36.0) 


 





 


Red Cell Distribution Width


 


 18.5 %


(11.6-14.8)  H 


 





 


Platelet Count


 


 74 K/UL


(150-450)  L 


 





 


Mean Platelet Volume


 


 10.3 FL


(6.5-10.1)  H 


 





 


Neutrophils (%) (Auto)


 


 % (45.0-75.0)


 


 





 


Lymphocytes (%) (Auto)


 


 % (20.0-45.0)


 


 





 


Monocytes (%) (Auto)   % (1.0-10.0)    


 


Eosinophils (%) (Auto)   % (0.0-3.0)    


 


Basophils (%) (Auto)   % (0.0-2.0)    


 


Differential Total Cells


Counted 


 100  


 


 





 


Neutrophils % (Manual)  95 % (45-75)  H  


 


Lymphocytes % (Manual)  4 % (20-45)  L  


 


Monocytes % (Manual)  1 % (1-10)    


 


Eosinophils % (Manual)  0 % (0-3)    


 


Basophils % (Manual)  0 % (0-2)    


 


Band Neutrophils  0 % (0-8)    


 


Platelet Estimate  Decreased  L  


 


Platelet Morphology  Normal    


 


Hypochromasia  1+    


 


Anisocytosis  1+    


 


Sodium Level


 


 151 MMOL/L


(136-145)  H 


 





 


Potassium Level


 


 4.0 MMOL/L


(3.5-5.1) 


 





 


Chloride Level


 


 116 MMOL/L


()  H 


 





 


Carbon Dioxide Level


 


 27 MMOL/L


(21-32) 


 





 


Anion Gap


 


 8 mmol/L


(5-15) 


 





 


Blood Urea Nitrogen


 


 28 mg/dL


(7-18)  H 


 





 


Creatinine


 


 0.8 MG/DL


(0.55-1.30) 


 





 


Estimat Glomerular Filtration


Rate 


 > 60 mL/min


(>60) 


 





 


Glucose Level


 


 154 MG/DL


()  H 


 





 


Calcium Level


 


 7.7 MG/DL


(8.5-10.1)  L 


 





 


Phosphorus Level


 


 3.0 MG/DL


(2.5-4.9) 


 





 


Magnesium Level


 


 2.1 MG/DL


(1.8-2.4) 


 





 


Total Bilirubin


 


 0.7 MG/DL


(0.2-1.0) 


 





 


Aspartate Amino Transf


(AST/SGOT) 


 35 U/L (15-37)


 


 





 


Alanine Aminotransferase


(ALT/SGPT) 


 43 U/L (12-78)


 


 





 


Alkaline Phosphatase


 


 47 U/L


() 


 





 


Total Protein


 


 4.6 G/DL


(6.4-8.2)  L 


 





 


Albumin


 


 1.2 G/DL


(3.4-5.0)  L 


 





 


Globulin  3.4 g/dL    


 


Albumin/Globulin Ratio


 


 0.4 (1.0-2.7)


L 


 





 


Arterial Blood pH


 


 


 


 7.527


(7.350-7.450)


 


Arterial Blood Partial


Pressure CO2 


 


 


 30.1 mmHg


(35.0-45.0)  L


 


Arterial Blood Partial


Pressure O2 


 


 


 128.8 mmHg


(75.0-100.0)  H


 


Arterial Blood HCO3


 


 


 


 24.4 mmol/L


(22.0-26.0)


 


Arterial Blood Oxygen


Saturation 


 


 


 97.8 %


()


 


Arterial Blood Base Excess    2 (-2-2)  


 


Cole Test    Positive  











Microbiology








 Date/Time


Source Procedure


Growth Status





 


 9/19/20 19:45


Blood Blood Culture - Preliminary


NO GROWTH AFTER 48 HOURS Resulted


 


 9/19/20 19:30


Blood Blood Culture - Preliminary


NO GROWTH AFTER 48 HOURS Resulted

















Intake and Output  


 


 9/21/20 9/22/20





 19:00 07:00


 


Intake Total 961.664 ml 950 ml


 


Output Total 895 ml 960 ml


 


Balance 66.664 ml -10 ml


 


  


 


Free Water 30 ml 300 ml


 


IV Total 481.664 ml 


 


Tube Feeding 450 ml 650 ml


 


Output Urine Total 745 ml 710 ml


 


Chest Tube Drainage Total 150 ml 250 ml


 


# Bowel Movements 3 6








Objective


General Appearance:  WD/WN, no apparent distress, alert


EENT:  PERRL/EOMI, normal ENT inspection


Neck:  non-tender, normal alignment, supple, normal inspection


Cardiovascular:  normal peripheral pulses, normal rate, regular rhythm, no 

gallop/murmur, no JVD


Respiratory/Chest:  Mech vent; decreased breath sounds, crackles/rales, rhonchi 

- bilaterally, expiratory wheezing


Abdomen:  normal bowel sounds, non tender, soft, no organomegaly, no mass


Extremities:  normal range of motion


Neurologic:  CNs II-XII grossly normal


Skin:  normal pigmentation, warm/dry





Assessment/Plan


Problem List:  


(1) HCAP (healthcare-associated pneumonia)


Assessment & Plan:  Strep Group G.  Continue amikacin per ID=Dr Gomes.  

Pulmonary/Critical care=DR Cherry.  COVID NEG





(2) Sepsis


Assessment & Plan:  Staph haemolyticus.  Continue amikacin per ID=Dr Gomes





(3) Down's syndrome


(4) Dysphagia


Assessment & Plan:  S/P PEG





(5) Seizure disorder


Assessment & Plan:  Continue keppra and depakote





(6) Hypothyroidism


Assessment & Plan:  Continue synthroid





(7) Acute respiratory failure


Assessment & Plan:  Pulmonary = Dr Cherry; [facility] vent














Sanya Schultz MD               Sep 22, 2020 17:15

## 2020-09-23 VITALS — DIASTOLIC BLOOD PRESSURE: 58 MMHG | SYSTOLIC BLOOD PRESSURE: 123 MMHG

## 2020-09-23 VITALS — DIASTOLIC BLOOD PRESSURE: 75 MMHG | SYSTOLIC BLOOD PRESSURE: 174 MMHG

## 2020-09-23 VITALS — SYSTOLIC BLOOD PRESSURE: 138 MMHG | DIASTOLIC BLOOD PRESSURE: 67 MMHG

## 2020-09-23 VITALS — DIASTOLIC BLOOD PRESSURE: 66 MMHG | SYSTOLIC BLOOD PRESSURE: 120 MMHG

## 2020-09-23 VITALS — DIASTOLIC BLOOD PRESSURE: 66 MMHG | SYSTOLIC BLOOD PRESSURE: 142 MMHG

## 2020-09-23 VITALS — SYSTOLIC BLOOD PRESSURE: 117 MMHG | DIASTOLIC BLOOD PRESSURE: 56 MMHG

## 2020-09-23 VITALS — DIASTOLIC BLOOD PRESSURE: 58 MMHG | SYSTOLIC BLOOD PRESSURE: 128 MMHG

## 2020-09-23 VITALS — DIASTOLIC BLOOD PRESSURE: 77 MMHG | SYSTOLIC BLOOD PRESSURE: 151 MMHG

## 2020-09-23 VITALS — DIASTOLIC BLOOD PRESSURE: 63 MMHG | SYSTOLIC BLOOD PRESSURE: 129 MMHG

## 2020-09-23 VITALS — SYSTOLIC BLOOD PRESSURE: 130 MMHG | DIASTOLIC BLOOD PRESSURE: 53 MMHG

## 2020-09-23 VITALS — SYSTOLIC BLOOD PRESSURE: 138 MMHG | DIASTOLIC BLOOD PRESSURE: 62 MMHG

## 2020-09-23 VITALS — SYSTOLIC BLOOD PRESSURE: 148 MMHG | DIASTOLIC BLOOD PRESSURE: 75 MMHG

## 2020-09-23 VITALS — SYSTOLIC BLOOD PRESSURE: 125 MMHG | DIASTOLIC BLOOD PRESSURE: 66 MMHG

## 2020-09-23 VITALS — DIASTOLIC BLOOD PRESSURE: 57 MMHG | SYSTOLIC BLOOD PRESSURE: 131 MMHG

## 2020-09-23 VITALS — SYSTOLIC BLOOD PRESSURE: 114 MMHG | DIASTOLIC BLOOD PRESSURE: 53 MMHG

## 2020-09-23 VITALS — DIASTOLIC BLOOD PRESSURE: 117 MMHG | SYSTOLIC BLOOD PRESSURE: 167 MMHG

## 2020-09-23 VITALS — DIASTOLIC BLOOD PRESSURE: 68 MMHG | SYSTOLIC BLOOD PRESSURE: 146 MMHG

## 2020-09-23 VITALS — SYSTOLIC BLOOD PRESSURE: 129 MMHG | DIASTOLIC BLOOD PRESSURE: 77 MMHG

## 2020-09-23 VITALS — SYSTOLIC BLOOD PRESSURE: 113 MMHG | DIASTOLIC BLOOD PRESSURE: 79 MMHG

## 2020-09-23 VITALS — SYSTOLIC BLOOD PRESSURE: 150 MMHG | DIASTOLIC BLOOD PRESSURE: 71 MMHG

## 2020-09-23 VITALS — SYSTOLIC BLOOD PRESSURE: 137 MMHG | DIASTOLIC BLOOD PRESSURE: 63 MMHG

## 2020-09-23 VITALS — DIASTOLIC BLOOD PRESSURE: 68 MMHG | SYSTOLIC BLOOD PRESSURE: 147 MMHG

## 2020-09-23 VITALS — SYSTOLIC BLOOD PRESSURE: 120 MMHG | DIASTOLIC BLOOD PRESSURE: 57 MMHG

## 2020-09-23 VITALS — DIASTOLIC BLOOD PRESSURE: 72 MMHG | SYSTOLIC BLOOD PRESSURE: 141 MMHG

## 2020-09-23 LAB
ADD MANUAL DIFF: YES
ALBUMIN SERPL-MCNC: 1.1 G/DL (ref 3.4–5)
ALBUMIN/GLOB SERPL: 0.4 {RATIO} (ref 1–2.7)
ALP SERPL-CCNC: 45 U/L (ref 46–116)
ALT SERPL-CCNC: 49 U/L (ref 12–78)
ANION GAP SERPL CALC-SCNC: 6 MMOL/L (ref 5–15)
APTT BLD: 28 SEC (ref 23–33)
AST SERPL-CCNC: 34 U/L (ref 15–37)
BILIRUB SERPL-MCNC: 0.6 MG/DL (ref 0.2–1)
BUN SERPL-MCNC: 29 MG/DL (ref 7–18)
CALCIUM SERPL-MCNC: 7.8 MG/DL (ref 8.5–10.1)
CHLORIDE SERPL-SCNC: 115 MMOL/L (ref 98–107)
CO2 SERPL-SCNC: 28 MMOL/L (ref 21–32)
CREAT SERPL-MCNC: 0.8 MG/DL (ref 0.55–1.3)
ERYTHROCYTE [DISTWIDTH] IN BLOOD BY AUTOMATED COUNT: 19 % (ref 11.6–14.8)
GLOBULIN SER-MCNC: 3 G/DL
HCT VFR BLD CALC: 23.8 % (ref 37–47)
HGB BLD-MCNC: 7.8 G/DL (ref 12–16)
INR PPP: 1.2 (ref 0.9–1.1)
MCV RBC AUTO: 95 FL (ref 80–99)
PHOSPHATE SERPL-MCNC: 3.2 MG/DL (ref 2.5–4.9)
PLATELET # BLD: 63 K/UL (ref 150–450)
POTASSIUM SERPL-SCNC: 3.7 MMOL/L (ref 3.5–5.1)
RBC # BLD AUTO: 2.51 M/UL (ref 4.2–5.4)
SODIUM SERPL-SCNC: 149 MMOL/L (ref 136–145)
WBC # BLD AUTO: 9.9 K/UL (ref 4.8–10.8)

## 2020-09-23 RX ADMIN — MIDODRINE HYDROCHLORIDE SCH MG: 10 TABLET ORAL at 20:07

## 2020-09-23 RX ADMIN — INSULIN ASPART SCH UNITS: 100 INJECTION, SOLUTION INTRAVENOUS; SUBCUTANEOUS at 23:58

## 2020-09-23 RX ADMIN — INSULIN DETEMIR SCH UNITS: 100 INJECTION, SOLUTION SUBCUTANEOUS at 20:06

## 2020-09-23 RX ADMIN — HYDROCORTISONE SODIUM SUCCINATE SCH MG: 100 INJECTION, POWDER, FOR SOLUTION INTRAMUSCULAR; INTRAVENOUS at 05:55

## 2020-09-23 RX ADMIN — ACETAMINOPHEN PRN MG: 160 SOLUTION ORAL at 17:23

## 2020-09-23 RX ADMIN — ACETAMINOPHEN PRN MG: 160 SOLUTION ORAL at 20:07

## 2020-09-23 RX ADMIN — INSULIN ASPART SCH UNITS: 100 INJECTION, SOLUTION INTRAVENOUS; SUBCUTANEOUS at 05:58

## 2020-09-23 RX ADMIN — ACETAMINOPHEN PRN MG: 160 SOLUTION ORAL at 00:20

## 2020-09-23 RX ADMIN — INSULIN DETEMIR SCH UNITS: 100 INJECTION, SOLUTION SUBCUTANEOUS at 09:06

## 2020-09-23 RX ADMIN — HYDROCORTISONE SODIUM SUCCINATE SCH MG: 100 INJECTION, POWDER, FOR SOLUTION INTRAMUSCULAR; INTRAVENOUS at 20:07

## 2020-09-23 RX ADMIN — MIDODRINE HYDROCHLORIDE SCH MG: 10 TABLET ORAL at 13:14

## 2020-09-23 RX ADMIN — INSULIN ASPART SCH UNITS: 100 INJECTION, SOLUTION INTRAVENOUS; SUBCUTANEOUS at 00:10

## 2020-09-23 RX ADMIN — HYDROCORTISONE SODIUM SUCCINATE SCH MG: 100 INJECTION, POWDER, FOR SOLUTION INTRAMUSCULAR; INTRAVENOUS at 13:14

## 2020-09-23 RX ADMIN — MIDODRINE HYDROCHLORIDE SCH MG: 10 TABLET ORAL at 05:55

## 2020-09-23 RX ADMIN — INSULIN ASPART SCH UNITS: 100 INJECTION, SOLUTION INTRAVENOUS; SUBCUTANEOUS at 17:33

## 2020-09-23 RX ADMIN — INSULIN ASPART SCH UNITS: 100 INJECTION, SOLUTION INTRAVENOUS; SUBCUTANEOUS at 11:46

## 2020-09-23 RX ADMIN — DOPAMINE HYDROCHLORIDE IN DEXTROSE SCH MLS/HR: 1.6 INJECTION, SOLUTION INTRAVENOUS at 11:15

## 2020-09-23 RX ADMIN — CHLORHEXIDINE GLUCONATE SCH APPLIC: 213 SOLUTION TOPICAL at 20:06

## 2020-09-23 RX ADMIN — SODIUM CHLORIDE SCH MLS/HR: 900 INJECTION, SOLUTION INTRAVENOUS at 05:58

## 2020-09-23 NOTE — DIAGNOSTIC IMAGING REPORT
Indication: History of pneumothorax, history of intubation

 

Technique: One view of the chest

 

Comparison: 9/22/2020

 

Findings: Extensive bilateral diffuse interstitial and airspace infiltrates are

unchanged. Right chest tube remains. Left arm PICC is again demonstrated. No

pneumothorax. No residual subcutaneous emphysema. Stable satisfactory position of

endotracheal tube. The heart is borderline enlarged.

 

Impression:  Unchanged, over one day, findings as above.

## 2020-09-23 NOTE — NUR
NURSE NOTES:



Cleaned patient for moderate amount of 1 brown loose BM. Right chest tube dressing changed 
with petroleum gauze, 4x4, abdominal pad and silk tapes. RR26, O2 92-94% on 80%. No acute 
respiratory distress noted. Patient tolerated procedure well.

## 2020-09-23 NOTE — NUR
NURSE HAND-OFF REPORT: 



Latest Vital Signs: Temperature 98.5 , Pulse 63 , B/P 150 /71 , Respiratory Rate 26 , O2 SAT 
97 , Mechanical Ventilator, FiO2 80% .  

Vital Sign Comment: 



EKG Rhythm: Sinus Rhythm

Rhythm change?: N 

MD Notified?: 

MD Response: 



Latest Momin Fall Score: 70  

Fall Risk: High Risk 

Safety Measures: Call light Within Reach, Bed Alarm Zone 1, Side Rails Side Rails x3, Bed 
position Low and Locked.

Fall Precautions: 

Yellow Socks

Door Sign

Patient Fall Education



Report given to LAYTON Yao.

## 2020-09-23 NOTE — INFECTIOUS DISEASES PROG NOTE
Assessment/Plan








ASSESSMENT:


sp code blue 8/26


Septic Shock; SP


Fever, recurrent- low grade 


Leukocytosis; persistent/fluctuating, SP


     -9/19 Bcx NTD


   -9/9 u/a no pyuria


   -9/8 Bcx NTD (Picc line)


   -9/6 Bcx NTD


            ucx Neg


             sp cx C. parapsilopsis


  -8/26 u/a no pyuria





Pneumonia.- COVID 19 neg x3


   Acute hypoxic resp failure on VM> NRB 15l 100%; hypoxic on ABG> now VDRF 

8/26- Fio2 80% >100% 8/28> 60% 9/1 >80% 9/2   >95% 9/10 >90% 9/14 >60% 9/15


R tension Pneumothroax sp CT 9/21


       -9/22 CXR: Interim complete reexpansion of right lung without evidence of

residual pneumothorax. Bilateral diffuse and extensive infiltrates. Markedly 

improved chest wall subcutaneous emphysema


       -9/21 CXR: Interim reexpansion of previously demonstrated right 

pneumothorax, status post large bore chest tube placement.


      -9/14 CXR: Improved aeration of both lungs.


       -9/5 CXR:Small bilateral pleural effusions with minor edema.  Stable 

edema with  mild worsening in the degree of pleural effusion on the right.


         -9/1 sp cx Neg


         8/31 CXR: Extensive bilateral interstitial and airspace disease appears

similar to the prior exam. Moderate to large bilateral pleural effusions appear 

unchanged.


   8/28 CXR: Increasing left upper lobe dense consolidation and likely 

increasing bilateral pleural fluid. Persistent diffuse dense consolidation 

elsewhere


            -8/26 sp cx normal resp lilliam


             -8/25 CXR: Increased atelectasis of the right lung, since prior 

exam of 3 days earlier. New or increased right pleural effusion. Increased left 

basilar consolidation and/or pleural fluid 


          -COVID Rapid PCR neg 8/23, 8/23, 8/26


          -8/22 spc x Group G strep


          -8/22 CXR:   Reduced lung volumes.  Patchy bilateral predominantly 

interstitial  pulmonary opacities.  Could be from edema and/or pneumonia.  There

is a broader differential.


 


        -legionella ag urine, blasto ab, Histo ab, HIV ab screen, JOY, ANCA neg





Persistent, high grade bacteremia-


     -8/22 Bcx 4/4 sets S. haemolyticus; 8/23 Bcx 3/4 S/ epi; 8/27 Bcx 1.4 S. 

warnerri; 8/29 Bcx Neg


     -2d echo: no vegetaions seen





          ua/ wbc 10-15, nit neg, leuk +1; ucx Neg





DAVID; 


 -supratherapeutic vanco levels





-Seizure disorder.


- Hypothyroidism.


- Down syndrome.





History of PEG tube placement.


[facility]





PLAN:





Continue to monitor off abx, if more episode of fever, will start Empiric AB Rx 


          9/14 SP Meropenem #18, IV AMikacin #7


          9/4/20 SP Daptomycin #11


          9/2 SP MIcafungin #7, Linezolid #5


   8/28 SP Azithromycin #7/7


   8/26 SP Ceftriaxone #2


   8/25 SP IV Vancomycin #4, Zosyn #4


   8/22 SP Cefepime x1, Flagyl x1





- Monitor CBC, BMP.


.f/u cx


- COVID neg x3


- Monitor chest x-ray.


- Monitor the patient's clinical course and labs.  Based on those, we will do 

further recommendation.


-f/u Fungitell, asp ag, flow cytometry


-poor prognosis











Thank you, Dr. Cherry, for allowing me to participate in the care of


this patient.  I will follow the patient with you at this


hospitalization.








Discussed with RN





Subjective


Allergies:  


Coded Allergies:  


     No Known Allergies (Unverified , 1/8/19)


Tm 100.7


no leukcoytosis


Bcx NTD





Objective





Last 24 Hour Vital Signs








  Date Time  Temp Pulse Resp B/P (MAP) Pulse Ox O2 Delivery O2 Flow Rate FiO2


 


9/23/20 15:10  48 26     80


 


9/23/20 15:00  59 38 142/66 (91) 92   


 


9/23/20 14:00  57 28 151/77 (101) 93   


 


9/23/20 13:00  54 26 120/66 (84) 94   


 


9/23/20 12:04  72      


 


9/23/20 12:00      Mechanical Ventilator  





      Mechanical Ventilator  


 


9/23/20 12:00 99.3 62 28 138/67 (90) 95   


 


9/23/20 11:20        80


 


9/23/20 11:16  69 26     80


 


9/23/20 11:00  58 26 138/62 (87) 91   


 


9/23/20 10:00  55 26 129/77 (94) 92   


 


9/23/20 09:00 99.0 59 26 130/53 (78) 92   


 


9/23/20 08:06  56      


 


9/23/20 08:00        70


 


9/23/20 08:00      Mechanical Ventilator  





      Mechanical Ventilator  


 


9/23/20 08:00  59 26 131/57 (81) 92   


 


9/23/20 07:00  60 28 129/63 (85) 92   


 


9/23/20 06:45  66 26     70


 


9/23/20 06:00  69 27 125/66 (85) 89   


 


9/23/20 05:58    117/56    


 


9/23/20 05:00  63 25 117/56 (76) 92   


 


9/23/20 04:00      Mechanical Ventilator  





      Mechanical Ventilator  


 


9/23/20 04:00  60      


 


9/23/20 04:00 98.6 62 37 113/79 (90) 94   


 


9/23/20 03:00  66 32 114/53 (73) 93   


 


9/23/20 03:00  66 32 114/53 (73) 93   


 


9/23/20 02:30  63 26     70


 


9/23/20 02:00        70


 


9/23/20 02:00  64 26 123/58 (79) 96   


 


9/23/20 01:00 99.8 58 26 120/57 (78) 98   


 


9/23/20 00:50 100.7       


 


9/23/20 00:00        80


 


9/23/20 00:00 100.7 69 21 128/58 (81) 98   


 


9/23/20 00:00      Mechanical Ventilator  





      Mechanical Ventilator  


 


9/23/20 00:00  63      


 


9/22/20 23:00  65 24 118/62 (80) 96   


 


9/22/20 22:30  59 26     80


 


9/22/20 22:00  55 26 112/86 (95) 99   


 


9/22/20 21:00  63      


 


9/22/20 21:00  68 16 101/55 (70) 98   


 


9/22/20 20:00        80


 


9/22/20 20:00 98.8 65 15 109/54 (72) 99   


 


9/22/20 20:00      Mechanical Ventilator  





      Mechanical Ventilator  


 


9/22/20 19:01 98.3 65 24 104/53 (70) 98   


 


9/22/20 18:30  67 26     90


 


9/22/20 18:00 98.3 60 30 128/60 (82) 99   


 


9/22/20 17:00  66 27 121/60 (80) 98   








Height (Feet):  5


Height (Inches):  3.00


Weight (Pounds):  172


HEENT:  No pale conjunctivae.  No icterus. ETT in place


NECK:  No lymphadenopathy.


CHEST:  Coarse breathing sounds.


HEART:  S1 and S2.


ABDOMEN:  Soft.  PEG tube in place.


EXTREMITIES:  No cyanosis at this time .


SKIN: no rash





Laboratory Tests








Test


 9/23/20


04:59


 


White Blood Count


 9.9 K/UL


(4.8-10.8)


 


Red Blood Count


 2.51 M/UL


(4.20-5.40)  L


 


Hemoglobin


 7.8 G/DL


(12.0-16.0)  L


 


Hematocrit


 23.8 %


(37.0-47.0)  L


 


Mean Corpuscular Volume 95 FL (80-99)  


 


Mean Corpuscular Hemoglobin


 31.0 PG


(27.0-31.0)


 


Mean Corpuscular Hemoglobin


Concent 32.7 G/DL


(32.0-36.0)


 


Red Cell Distribution Width


 19.0 %


(11.6-14.8)  H


 


Platelet Count


 63 K/UL


(150-450)  L


 


Mean Platelet Volume


 9.3 FL


(6.5-10.1)


 


Neutrophils (%) (Auto)


 % (45.0-75.0)





 


Lymphocytes (%) (Auto)


 % (20.0-45.0)





 


Monocytes (%) (Auto)  % (1.0-10.0)  


 


Eosinophils (%) (Auto)  % (0.0-3.0)  


 


Basophils (%) (Auto)  % (0.0-2.0)  


 


Differential Total Cells


Counted 100  





 


Neutrophils % (Manual) 92 % (45-75)  H


 


Lymphocytes % (Manual) 7 % (20-45)  L


 


Monocytes % (Manual) 1 % (1-10)  


 


Eosinophils % (Manual) 0 % (0-3)  


 


Basophils % (Manual) 0 % (0-2)  


 


Band Neutrophils 0 % (0-8)  


 


Platelet Estimate Decreased  L


 


Platelet Morphology Normal  


 


Anisocytosis 1+  


 


Prothrombin Time


 13.0 SEC


(9.30-11.50)  H


 


Prothromb Time International


Ratio 1.2 (0.9-1.1)


H


 


Activated Partial


Thromboplast Time 28 SEC (23-33)





 


Sodium Level


 149 MMOL/L


(136-145)  H


 


Potassium Level


 3.7 MMOL/L


(3.5-5.1)


 


Chloride Level


 115 MMOL/L


()  H


 


Carbon Dioxide Level


 28 MMOL/L


(21-32)


 


Anion Gap


 6 mmol/L


(5-15)


 


Blood Urea Nitrogen


 29 mg/dL


(7-18)  H


 


Creatinine


 0.8 MG/DL


(0.55-1.30)


 


Estimat Glomerular Filtration


Rate > 60 mL/min


(>60)


 


Glucose Level


 170 MG/DL


()  H


 


Calcium Level


 7.8 MG/DL


(8.5-10.1)  L


 


Phosphorus Level


 3.2 MG/DL


(2.5-4.9)


 


Magnesium Level


 1.9 MG/DL


(1.8-2.4)


 


Total Bilirubin


 0.6 MG/DL


(0.2-1.0)


 


Aspartate Amino Transf


(AST/SGOT) 34 U/L (15-37)





 


Alanine Aminotransferase


(ALT/SGPT) 49 U/L (12-78)





 


Alkaline Phosphatase


 45 U/L


()  L


 


Total Protein


 4.1 G/DL


(6.4-8.2)  L


 


Albumin


 1.1 G/DL


(3.4-5.0)  L


 


Globulin 3.0 g/dL  


 


Albumin/Globulin Ratio


 0.4 (1.0-2.7)


L











Current Medications








 Medications


  (Trade)  Dose


 Ordered  Sig/Didi


 Route


 PRN Reason  Start Time


 Stop Time Status Last Admin


Dose Admin


 


 Acetaminophen


  (Tylenol)  650 mg  Q4H  PRN


 GT


 FEVER  8/26/20 11:45


 9/25/20 11:44  9/23/20 00:20





 


 Chlorhexidine


 Gluconate


  (Beatrice-Hex 2%)  1 applic  DAILY@2000


 TOPIC


   8/31/20 20:00


 11/29/20 19:59  9/22/20 20:55





 


 Clotrimazole


  (Lotrimin)  1 applic  Q12HR


 TOPIC


   8/30/20 13:00


 11/28/20 12:59  9/23/20 08:31





 


 Dextrose


  (Dextrose 50%)  25 ml  Q30M  PRN


 IV


 Hypoglycemia  8/26/20 11:30


 11/20/20 11:29   





 


 Dextrose


  (Dextrose 50%)  50 ml  Q30M  PRN


 IV


 Hypoglycemia  8/26/20 11:30


 11/20/20 11:29   





 


 Dopamine HCl/


 Dextrose  250 ml @ 0


 mls/hr  Q24H


 IV


   9/4/20 11:15


 9/25/20 23:59  9/6/20 19:47





 


 Hydrocortisone


  (Solu-CORTEF)  100 mg  EVERY 8  HOURS


 IV


   8/30/20 14:00


 11/28/20 13:59  9/23/20 13:14





 


 Insulin Aspart


  (NovoLOG)    Q6HR


 SUBQ


   9/18/20 12:00


 12/17/20 11:59  9/23/20 11:46





 


 Insulin Detemir


  (Levemir)  10 units  Q12HR


 SUBQ


   9/18/20 10:00


 12/17/20 09:59  9/23/20 09:06





 


 Loperamide HCl


  (Imodium)  2 mg  Q6H  PRN


 NG


 Diarrhea  9/16/20 10:30


 10/16/20 10:29  9/23/20 11:41





 


 Midodrine


  (Pro-Amatine)  10 mg  Q8HR


 GT


   8/28/20 14:00


 11/26/20 13:59  9/23/20 13:14





 


 Norepinephrine


 Bitartrate 16 mg/


 Dextrose  500 ml @ 0


 mls/hr  Q24H


 IV


   8/31/20 07:45


 9/25/20 23:59  9/4/20 08:43





 


 Phenylephrine HCl


 50 mg/Dextrose  250 ml @ 0


 mls/hr  Q24H  PRN


 IV


 For hypotension  8/29/20 17:45


 9/25/20 23:59  8/31/20 01:49





 


 Potassium Chloride


  (K-Dur)  40 meq  TWICE A  DAY


 GT


   9/20/20 12:15


 12/19/20 12:14  9/23/20 08:30




















Rema Gomes M.D.            Sep 23, 2020 16:13

## 2020-09-23 NOTE — NUR
NURSE NOTES:

Patient repositioned and suctioned patient. Oral care performed. Patient is making good 
urine output. NAD at this time. No BM. Temperature now is 99.0 (ax). HR is 55 SB with good 
blood pressures.

## 2020-09-23 NOTE — PULMONOLGY CRITICAL CARE NOTE
Critical Care - Asmt/Plan


Problems:  


(1) Acute respiratory failure


(2) Bacteremia


(3) Pneumonia


(4) Sepsis


(5) HCAP (healthcare-associated pneumonia)


(6) Seizure disorder


(7) Down's syndrome


Respiratory:  monitor respiratory rate, adjust FIO2, CXR


Cardiac:  continue pressors, continue to monitor HR/BP


Renal:  F/U I&O, keep IV fluid, check electrolytes


Infectious Disease:  check cultures


Gastrointestinal:  continue feedings/current rate


Endocrine:  monitor blood sugar, continue sliding scale insulin


Hematologic:  monitor H/H, transfuse if hgb<8.5


Neurologic:  PRN Ativan, keep patient comfortable


Affect:  PRN ativan


Notes Reviewed:  internist, cardio, renal


Discussed with:  nurses, consultants, 





Critical Care - Objective





Last 24 Hour Vital Signs








  Date Time  Temp Pulse Resp B/P (MAP) Pulse Ox O2 Delivery O2 Flow Rate FiO2


 


9/23/20 06:00  69 27 125/66 (85) 89   


 


9/23/20 05:58    117/56    


 


9/23/20 05:00  63 25 117/56 (76) 92   


 


9/23/20 04:00      Mechanical Ventilator  





      Mechanical Ventilator  


 


9/23/20 04:00  60      


 


9/23/20 04:00 98.6 62 37 113/79 (90) 94   


 


9/23/20 03:00  66 32 114/53 (73) 93   


 


9/23/20 03:00  66 32 114/53 (73) 93   


 


9/23/20 02:30  63 26     70


 


9/23/20 02:00        70


 


9/23/20 02:00  64 26 123/58 (79) 96   


 


9/23/20 01:00 99.8 58 26 120/57 (78) 98   


 


9/23/20 00:50 100.7       


 


9/23/20 00:00        80


 


9/23/20 00:00 100.7 69 21 128/58 (81) 98   


 


9/23/20 00:00      Mechanical Ventilator  





      Mechanical Ventilator  


 


9/23/20 00:00  63      


 


9/22/20 23:00  65 24 118/62 (80) 96   


 


9/22/20 22:30  59 26     80


 


9/22/20 22:00  55 26 112/86 (95) 99   


 


9/22/20 21:00  63      


 


9/22/20 21:00  68 16 101/55 (70) 98   


 


9/22/20 20:00        80


 


9/22/20 20:00 98.8 65 15 109/54 (72) 99   


 


9/22/20 20:00      Mechanical Ventilator  





      Mechanical Ventilator  


 


9/22/20 19:01 98.3 65 24 104/53 (70) 98   


 


9/22/20 18:30  67 26     90


 


9/22/20 18:00 98.3 60 30 128/60 (82) 99   


 


9/22/20 17:00  66 27 121/60 (80) 98   


 


9/22/20 16:00  66      


 


9/22/20 16:00      Mechanical Ventilator  





      Mechanical Ventilator  


 


9/22/20 16:00  61 26 118/59 (78) 97   


 


9/22/20 15:06  66 26     90


 


9/22/20 15:00        80


 


9/22/20 15:00  62 26 109/58 (75) 97   


 


9/22/20 14:00  63 25 111/53 (72) 97   


 


9/22/20 13:00  59 26 118/72 (87) 97   


 


9/22/20 12:00  57      


 


9/22/20 12:00      Mechanical Ventilator  





      Mechanical Ventilator  


 


9/22/20 12:00 97.0 52 26 130/60 (83) 98   


 


9/22/20 11:32  61 26     90


 


9/22/20 11:15    128/54    


 


9/22/20 11:00  55 26 128/54 (78) 97   


 


9/22/20 10:00  54 26 139/55 (83) 98   


 


9/22/20 09:00  55 25 132/59 (83) 98   


 


9/22/20 09:00        80








Status:  awake, sedated


Condition:  critical


HEENT:  atraumatic, normocephalic


Neck:  full ROM


Lungs:  clear, chest wall tender


Heart:  HR/BP stable


Abdomen:  soft, non-tender


Extremities:  no C/C/E


Decubiti:  location


Accucheck:  150





Critical Care - Subjective


ROS Limited/Unobtainable:  Yes


Condition:  critical


EKG Rhythm:  Sinus Rhythm


FI02:  70


Vent Support Breath Rate:  26


Vent Support Mode:  AC


Vent Tidal Volume:  500


Sputum Amount:  Moderate


PEEP:  0.0


PIP:  50


Tube Feeding Amount:  50


I&O:











Intake and Output  


 


 9/22/20 9/23/20





 19:00 07:00


 


Intake Total 725 ml 850 ml


 


Output Total 1325 ml 1550 ml


 


Balance -600 ml -700 ml


 


  


 


Free Water  300 ml


 


Tube Feeding 600 ml 550 ml


 


Other 125 ml 


 


Output Urine Total 1125 ml 1250 ml


 


Chest Tube Drainage Total 200 ml 300 ml


 


# Bowel Movements 3 4








CXR:


chest tube and ET tube in place


ET-Tube:  7.5


ET Position:  22


Labs:





Laboratory Tests








Test


 9/23/20


04:59


 


White Blood Count


 9.9 K/UL


(4.8-10.8)


 


Red Blood Count


 2.51 M/UL


(4.20-5.40)  L


 


Hemoglobin


 7.8 G/DL


(12.0-16.0)  L


 


Hematocrit


 23.8 %


(37.0-47.0)  L


 


Mean Corpuscular Volume 95 FL (80-99)  


 


Mean Corpuscular Hemoglobin


 31.0 PG


(27.0-31.0)


 


Mean Corpuscular Hemoglobin


Concent 32.7 G/DL


(32.0-36.0)


 


Red Cell Distribution Width


 19.0 %


(11.6-14.8)  H


 


Platelet Count


 63 K/UL


(150-450)  L


 


Mean Platelet Volume


 9.3 FL


(6.5-10.1)


 


Neutrophils (%) (Auto)


 % (45.0-75.0)





 


Lymphocytes (%) (Auto)


 % (20.0-45.0)





 


Monocytes (%) (Auto)  % (1.0-10.0)  


 


Eosinophils (%) (Auto)  % (0.0-3.0)  


 


Basophils (%) (Auto)  % (0.0-2.0)  


 


Differential Total Cells


Counted 100  





 


Neutrophils % (Manual) 92 % (45-75)  H


 


Lymphocytes % (Manual) 7 % (20-45)  L


 


Monocytes % (Manual) 1 % (1-10)  


 


Eosinophils % (Manual) 0 % (0-3)  


 


Basophils % (Manual) 0 % (0-2)  


 


Band Neutrophils 0 % (0-8)  


 


Platelet Estimate Decreased  L


 


Platelet Morphology Normal  


 


Anisocytosis 1+  


 


Prothrombin Time


 13.0 SEC


(9.30-11.50)  H


 


Prothromb Time International


Ratio 1.2 (0.9-1.1)


H


 


Activated Partial


Thromboplast Time 28 SEC (23-33)





 


Sodium Level


 149 MMOL/L


(136-145)  H


 


Potassium Level


 3.7 MMOL/L


(3.5-5.1)


 


Chloride Level


 115 MMOL/L


()  H


 


Carbon Dioxide Level


 28 MMOL/L


(21-32)


 


Anion Gap


 6 mmol/L


(5-15)


 


Blood Urea Nitrogen


 29 mg/dL


(7-18)  H


 


Creatinine


 0.8 MG/DL


(0.55-1.30)


 


Estimat Glomerular Filtration


Rate > 60 mL/min


(>60)


 


Glucose Level


 170 MG/DL


()  H


 


Calcium Level


 7.8 MG/DL


(8.5-10.1)  L


 


Phosphorus Level


 3.2 MG/DL


(2.5-4.9)


 


Magnesium Level


 1.9 MG/DL


(1.8-2.4)


 


Total Bilirubin


 0.6 MG/DL


(0.2-1.0)


 


Aspartate Amino Transf


(AST/SGOT) 34 U/L (15-37)





 


Alanine Aminotransferase


(ALT/SGPT) 49 U/L (12-78)





 


Alkaline Phosphatase


 45 U/L


()  L


 


Total Protein


 4.1 G/DL


(6.4-8.2)  L


 


Albumin


 1.1 G/DL


(3.4-5.0)  L


 


Globulin 3.0 g/dL  


 


Albumin/Globulin Ratio


 0.4 (1.0-2.7)


L

















Chano Cherry MD              Sep 23, 2020 08:33

## 2020-09-23 NOTE — NEPHROLOGY PROGRESS NOTE
Assessment/Plan


Problem List:  


(1) DAVID (acute kidney injury)


(2) Respiratory failure requiring intubation


(3) Down's syndrome


(4) Seizure disorder


(5) Hypothyroidism


Assessment





Acute renal failure, likely due to hypotension


Acute respiratory distress, hypoxia


Seizure disorder


Hypothyroidism


Down syndrome


Full code











Fluid challenge with IV fluids and albumin


Midodrine for BP above 100 systolic


Check TSH level


Check


Correct level


Monitor renal parameters


Urine studies


Per orders


Plan


September 23: Lab reviewed.  Renal parameters stable.  Full code.  Has right 

chest tube.  Planning process for tracheostomy.  Defer to chest and general 

surgeon.


September 22: Labs reviewed.  Renal parameters stable.  Remains full code.  

Remains on ventilator.  Due for tracheostomy tomorrow.  Continue per 

consultants.  Patient has a right chest tube in place at this time.


September 21: Labs reviewed.  Electrolyte imbalances addressed and supplemented.

 Remains full code and on ventilator.  Continue to monitor renal parameters.  

Continue per consultants.


September 20: Labs reviewed.  Serum potassium again low today.  Potassium 

supplement IV and through GT given.  Patient remains full code and is on ventil

ator.  Continue per consultants.  Continue to monitor electrolytes and renal 

parameters.


September 19: Labs reviewed.  Abnormal electrolyte addressed.  Remains full 

code.  Remains vented.


September 18: Day 27 of hospitalization.  Full code.  Labs reviewed.  Hemoglobin

down to 7.5.  Electrolyte abnormalities addressed and corrections ordered.  

Continue to monitor renal parameters.  Continue per consultants.  Start on 

Levemir for blood sugar management.  Questioning continuation of hydrocortisone?


September 17: Labs reviewed.  Potassium, phosphorus, hemoglobin, are all low.  

Potassium and phosphorus IV replacement given.  Continue to monitor electrolytes

and CBC.  Patient remains full code.


September 16: Lab reviewed.  Low phosphorus low magnesium and low potassium was 

addressed.  Hemoglobin drifting lower.  Continue per consultants.  Patient 

remains full code.


September 15: Labs reviewed.  Patient continues to be on ventilator.  D5W for 

high sodium and also potassium chloride intravenously as supplement given.  

Hemoglobin 8.4.  Continue to monitor electrolytes and renal parameters.


September 14: Labs reviewed.  Low potassium and high sodium noted.  Hemoglobin 

8.1 stable.  Aim to correct abnormal electrolyte.  Continue rest.  Will give 2 

boluses of D5W 500 cc.


September 13: Lab reviewed.  Abnormal electrolytes noted and addressed.


September 12: Labs reviewed.  Potassium supplement given.  Patient remains full 

code.  Continue per consultants.


September 11: Lab reviewed.  Electrolyte abnormalities addressed.  Continue per 

pulmonary and ID.


September 10: Lab reviewed.  Status unchanged.  Serum sodium 151 unchanged.  

Stable from renal standpoint of view.


September 9: Labs reviewed.  Status quo.  D5W 500 cc IV ordered.  Continue to 

monitor renal parameters.


September 8: Status quo.  Labs reviewed.  Overall condition unchanged.  Patient 

was transfused and hemoglobin higher.  Continue current management.  Patient 

remains full code.


September 7: Status quo.  Overall condition poor.  Very low albumin.  Edematous.

 Hypotensive.  Hemoglobin lower.  Anemia work-up ordered.  I favor transfusion 2

units of packed RBCs.  Patient remains full code.  I favor supportive care only.

 Will discuss.


September 6: Electrolyte abnormalities addressed.  Serum creatinine lower.  

Continue per current management.


September 5: Status unchanged.  Lab reviewed.  Serum potassium 2.7.  IV 

potassium chloride ordered.  Serum creatinine low at 1.6 stable.  Blood pressure

90s systolic


September 4: Status quo.  Labs reviewed.  Renal parameters stable.  Serum 

creatinine down to 1.6.  Medication list reviewed.  Continues to be on 

midodrine.  Continue per consultants.


September 3: Status quo.  Labs reviewed.  Electrolytes adjusted.  Serum 

creatinine down to 1.8.  Continue per consultants.


September 2: Status quo.  Labs reviewed.  Phosphorus supplement IV given.  Serum

creatinine 2.  Continue per consultants.


September 1: Requires less pressors.  Albumin bolus given.  1 dose of Lasix IV 

ordered as the patient severely edematous.  Patient serum albumin is very low.  

Continue per consultants.


August 31: Continues to be intubated.  Labs reviewed.  Serum creatinine 1.9 

unchanged.  Blood pressure more stable.  Off 1 of the pressors.  Continue to 

monitor renal parameters.  Continue per consultants.  Patient now on hyd

rocortisone 100 mg every 8 hours.  Will decrease IV fluid.  Normal saline down 

to 50 cc an hour.


August 30: Intubated.  Labs reviewed.  Creatinine 1.9 unchanged.  Continue same 

treatment plan.  Per consultants.  Overall poor prognosis since the patient rem

ains on pressors and her pulmonary status is worsening.


August 29: Remains intubated.  Labs reviewed.  Creatinine 1.9.  Blood pressure 

systolic 90s.  Continue per consultants.


August 28: Remains intubated.  Labs reviewed.  Serum creatinine lower to 2.  Van

comycin level lower.  Remains hypotensive on pressors.  Will increase midodrine 

to 10 mg every 8 hours.  Continue per consultants.  Continue to monitor renal 

parameters.


August 27: Patient now in ICU.  Intubated.  On pressors.  Labs reviewed.  Will 

increase midodrine.  Aim to keep blood pressure over 100 systolic.  Will give 

albumin bolus.  Will check vancomycin level which was elevated when checked 

previously on August 24.  Will monitor renal parameters.  Continue per 

consultants.





Subjective


ROS Limited/Unobtainable:  Yes





Objective


Objective





Last 24 Hour Vital Signs








  Date Time  Temp Pulse Resp B/P (MAP) Pulse Ox O2 Delivery O2 Flow Rate FiO2


 


9/23/20 06:00  69 27 125/66 (85) 89   


 


9/23/20 05:58    117/56    


 


9/23/20 05:00  63 25 117/56 (76) 92   


 


9/23/20 04:00      Mechanical Ventilator  





      Mechanical Ventilator  


 


9/23/20 04:00  60      


 


9/23/20 04:00 98.6 62 37 113/79 (90) 94   


 


9/23/20 03:00  66 32 114/53 (73) 93   


 


9/23/20 03:00  66 32 114/53 (73) 93   


 


9/23/20 02:30  63 26     70


 


9/23/20 02:00        70


 


9/23/20 02:00  64 26 123/58 (79) 96   


 


9/23/20 01:00 99.8 58 26 120/57 (78) 98   


 


9/23/20 00:50 100.7       


 


9/23/20 00:00        80


 


9/23/20 00:00 100.7 69 21 128/58 (81) 98   


 


9/23/20 00:00      Mechanical Ventilator  





      Mechanical Ventilator  


 


9/23/20 00:00  63      


 


9/22/20 23:00  65 24 118/62 (80) 96   


 


9/22/20 22:30  59 26     80


 


9/22/20 22:00  55 26 112/86 (95) 99   


 


9/22/20 21:00  63      


 


9/22/20 21:00  68 16 101/55 (70) 98   


 


9/22/20 20:00        80


 


9/22/20 20:00 98.8 65 15 109/54 (72) 99   


 


9/22/20 20:00      Mechanical Ventilator  





      Mechanical Ventilator  


 


9/22/20 19:01 98.3 65 24 104/53 (70) 98   


 


9/22/20 18:30  67 26     90


 


9/22/20 18:00 98.3 60 30 128/60 (82) 99   


 


9/22/20 17:00  66 27 121/60 (80) 98   


 


9/22/20 16:00  66      


 


9/22/20 16:00      Mechanical Ventilator  





      Mechanical Ventilator  


 


9/22/20 16:00  61 26 118/59 (78) 97   


 


9/22/20 15:06  66 26     90


 


9/22/20 15:00        80


 


9/22/20 15:00  62 26 109/58 (75) 97   


 


9/22/20 14:00  63 25 111/53 (72) 97   


 


9/22/20 13:00  59 26 118/72 (87) 97   


 


9/22/20 12:00  57      


 


9/22/20 12:00      Mechanical Ventilator  





      Mechanical Ventilator  


 


9/22/20 12:00 97.0 52 26 130/60 (83) 98   


 


9/22/20 11:32  61 26     90


 


9/22/20 11:15    128/54    


 


9/22/20 11:00  55 26 128/54 (78) 97   


 


9/22/20 10:00  54 26 139/55 (83) 98   


 


9/22/20 09:00  55 25 132/59 (83) 98   


 


9/22/20 09:00        80


 


9/22/20 08:15        90

















Intake and Output  


 


 9/22/20 9/23/20





 19:00 07:00


 


Intake Total 725 ml 850 ml


 


Output Total 1325 ml 1550 ml


 


Balance -600 ml -700 ml


 


  


 


Free Water  300 ml


 


Tube Feeding 600 ml 550 ml


 


Other 125 ml 


 


Output Urine Total 1125 ml 1250 ml


 


Chest Tube Drainage Total 200 ml 300 ml


 


# Bowel Movements 3 4








Laboratory Tests


9/22/20 08:13: 


Arterial Blood pH 7.527H, Arterial Blood Partial Pressure CO2 30.1L, Arterial 

Blood Partial Pressure O2 128.8H, Arterial Blood HCO3 24.4, Arterial Blood 

Oxygen Saturation 97.8, Arterial Blood Base Excess 2, Cole Test Positive


9/23/20 04:59: 


White Blood Count 9.9, Red Blood Count 2.51L, Hemoglobin 7.8L, Hematocrit 23.8L,

Mean Corpuscular Volume 95, Mean Corpuscular Hemoglobin 31.0, Mean Corpuscular 

Hemoglobin Concent 32.7, Red Cell Distribution Width 19.0H, Platelet Count 63L, 

Mean Platelet Volume 9.3, Neutrophils (%) (Auto) , Lymphocytes (%) (Auto) , 

Monocytes (%) (Auto) , Eosinophils (%) (Auto) , Basophils (%) (Auto) , 

Neutrophils % (Manual) [Pending], Lymphocytes % (Manual) [Pending], Platelet 

Estimate [Pending], Platelet Morphology [Pending], Prothrombin Time 13.0H, 

Prothromb Time International Ratio 1.2H, Activated Partial Thromboplast Time 28,

 Sodium Level 149H, Potassium Level 3.7, Chloride Level 115H, Carbon Dioxide 

Level 28, Anion Gap 6, Blood Urea Nitrogen 29H, Creatinine 0.8, Estimat 

Glomerular Filtration Rate > 60, Glucose Level 170H, Calcium Level 7.8L, 

Phosphorus Level 3.2, Magnesium Level 1.9, Total Bilirubin 0.6, Aspartate Amino 

Transf (AST/SGOT) 34, Alanine Aminotransferase (ALT/SGPT) 49, Alkaline 

Phosphatase 45L, Total Protein 4.1L, Albumin 1.1L, Globulin 3.0, 

Albumin/Globulin Ratio 0.4L


Height (Feet):  5


Height (Inches):  3.00


Weight (Pounds):  172


General Appearance:  no apparent distress


EENT:  other - Intubated on ventilator


Cardiovascular:  normal rate


Respiratory/Chest:  decreased breath sounds, other - Right chest tube in place











Lam Nino MD            Sep 23, 2020 08:04

## 2020-09-23 NOTE — GENERAL PROGRESS NOTE
Subjective


ROS Limited/Unobtainable:  No


Allergies:  


Coded Allergies:  


     No Known Allergies (Unverified , 1/8/19)





Objective





Last 24 Hour Vital Signs








  Date Time  Temp Pulse Resp B/P (MAP) Pulse Ox O2 Delivery O2 Flow Rate FiO2


 


9/23/20 10:00  55 26 129/77 (94) 92   


 


9/23/20 09:00 99.0 59 26 130/53 (78) 92   


 


9/23/20 08:00  59 26 131/57 (81) 92   


 


9/23/20 07:00  60 28 129/63 (85) 92   


 


9/23/20 06:45  66 26     70


 


9/23/20 06:00  69 27 125/66 (85) 89   


 


9/23/20 05:58    117/56    


 


9/23/20 05:00  63 25 117/56 (76) 92   


 


9/23/20 04:00      Mechanical Ventilator  





      Mechanical Ventilator  


 


9/23/20 04:00  60      


 


9/23/20 04:00 98.6 62 37 113/79 (90) 94   


 


9/23/20 03:00  66 32 114/53 (73) 93   


 


9/23/20 03:00  66 32 114/53 (73) 93   


 


9/23/20 02:30  63 26     70


 


9/23/20 02:00        70


 


9/23/20 02:00  64 26 123/58 (79) 96   


 


9/23/20 01:00 99.8 58 26 120/57 (78) 98   


 


9/23/20 00:50 100.7       


 


9/23/20 00:00        80


 


9/23/20 00:00 100.7 69 21 128/58 (81) 98   


 


9/23/20 00:00      Mechanical Ventilator  





      Mechanical Ventilator  


 


9/23/20 00:00  63      


 


9/22/20 23:00  65 24 118/62 (80) 96   


 


9/22/20 22:30  59 26     80


 


9/22/20 22:00  55 26 112/86 (95) 99   


 


9/22/20 21:00  63      


 


9/22/20 21:00  68 16 101/55 (70) 98   


 


9/22/20 20:00        80


 


9/22/20 20:00 98.8 65 15 109/54 (72) 99   


 


9/22/20 20:00      Mechanical Ventilator  





      Mechanical Ventilator  


 


9/22/20 19:01 98.3 65 24 104/53 (70) 98   


 


9/22/20 18:30  67 26     90


 


9/22/20 18:00 98.3 60 30 128/60 (82) 99   


 


9/22/20 17:00  66 27 121/60 (80) 98   


 


9/22/20 16:00  66      


 


9/22/20 16:00      Mechanical Ventilator  





      Mechanical Ventilator  


 


9/22/20 16:00  61 26 118/59 (78) 97   


 


9/22/20 15:06  66 26     90


 


9/22/20 15:00        80


 


9/22/20 15:00  62 26 109/58 (75) 97   


 


9/22/20 14:00  63 25 111/53 (72) 97   


 


9/22/20 13:00  59 26 118/72 (87) 97   


 


9/22/20 12:00  57      


 


9/22/20 12:00      Mechanical Ventilator  





      Mechanical Ventilator  


 


9/22/20 12:00 97.0 52 26 130/60 (83) 98   


 


9/22/20 11:32  61 26     90


 


9/22/20 11:15    128/54    


 


9/22/20 11:00  55 26 128/54 (78) 97   

















Intake and Output  


 


 9/22/20 9/23/20





 19:00 07:00


 


Intake Total 725 ml 900 ml


 


Output Total 1325 ml 1620 ml


 


Balance -600 ml -720 ml


 


  


 


Free Water  300 ml


 


Tube Feeding 600 ml 600 ml


 


Other 125 ml 


 


Output Urine Total 1125 ml 1320 ml


 


Chest Tube Drainage Total 200 ml 300 ml


 


# Bowel Movements 3 4








Laboratory Tests


9/23/20 04:59: 


White Blood Count 9.9, Red Blood Count 2.51L, Hemoglobin 7.8L, Hematocrit 23.8L,

Mean Corpuscular Volume 95, Mean Corpuscular Hemoglobin 31.0, Mean Corpuscular 

Hemoglobin Concent 32.7, Red Cell Distribution Width 19.0H, Platelet Count 63L, 

Mean Platelet Volume 9.3, Neutrophils (%) (Auto) , Lymphocytes (%) (Auto) , 

Monocytes (%) (Auto) , Eosinophils (%) (Auto) , Basophils (%) (Auto) , 

Differential Total Cells Counted 100, Neutrophils % (Manual) 92H, Lymphocytes % 

(Manual) 7L, Monocytes % (Manual) 1, Eosinophils % (Manual) 0, Basophils % 

(Manual) 0, Band Neutrophils 0, Platelet Estimate DecreasedL, Platelet 

Morphology Normal, Anisocytosis 1+, Prothrombin Time 13.0H, Prothromb Time 

International Ratio 1.2H, Activated Partial Thromboplast Time 28, Sodium Level 

149H, Potassium Level 3.7, Chloride Level 115H, Carbon Dioxide Level 28, Anion 

Gap 6, Blood Urea Nitrogen 29H, Creatinine 0.8, Estimat Glomerular Filtration 

Rate > 60, Glucose Level 170H, Calcium Level 7.8L, Phosphorus Level 3.2, 

Magnesium Level 1.9, Total Bilirubin 0.6, Aspartate Amino Transf (AST/SGOT) 34, 

Alanine Aminotransferase (ALT/SGPT) 49, Alkaline Phosphatase 45L, Total Protein 

4.1L, Albumin 1.1L, Globulin 3.0, Albumin/Globulin Ratio 0.4L


Height (Feet):  5


Height (Inches):  3.00


Weight (Pounds):  172


General Appearance:  lethargic


EENT:  normal ENT inspection


Neck:  supple


Cardiovascular:  normal rate


Respiratory/Chest:  decreased breath sounds, other - chest tube


Extremities:  non-tender





Assessment/Plan


Status:  stable, not improved, unchanged


Assessment/Plan:


1. History of Down syndrome.


2. Dysphagia with G-tube.


3. Seizure disorder.


4. Hypothyroidism.


5. DAVID.


6. Pneumonia.


7. Sepsis.








fu H&H


prn blood transfusion to keep HGB above 7


ppi


GTF


hold GI procedures for now


stool ob + 1/2











Ted Nagy MD             Sep 23, 2020 10:29

## 2020-09-23 NOTE — NUR
NURSE NOTES:



125cc pinkish serous drainage from chest tube noted. Continuos bubbling in water weal 
chamber still noted. Will follow Dr Knox's order. New chest tube dressing dry and intact.

## 2020-09-23 NOTE — GENERAL PROGRESS NOTE
Progress Note


Progress Note


Pt needs blood transfusion urgently.  She can't sign her consents and she 

doesn't have any next of kin either.











Chano Cherry MD              Sep 23, 2020 08:36

## 2020-09-23 NOTE — NUR
NURSE NOTES:



pRBC transfusion is done. Post transfusion temp 99.2, HR 60, /77. No adverse 
transfusion reaction noted.

## 2020-09-23 NOTE — NUR
NURSE NOTES:

 INSULIN COVERAGE GIVEN AS ORDER AND TEMP 1OO.7 MEDICATION GIVEN FOR FEVER  AND 
COOLING MEASURE DONE

## 2020-09-23 NOTE — NUR
NURSE NOTES:



Right Chest tube in place, connected to 20mmHG low continuous suction as per order. Serous 
yellow drainage noted on collection chamber. Dressing dry and intact. Continuos bubbling 
noted on water seal. With the tubing to patient clamped, bubbling continues. Dr Cherry here 
to see the patient and notified him of the air leak. No new orders for now.

## 2020-09-23 NOTE — NUR
NURSE NOTES:

Patients temperature noted to be 100F, tylenol 650mg via GT was given and Cooling measures 
performed. Dr. Gomes at bedside and informed her of the Temperature, no new orders at this 
time. 

CHG bath was given. Repositioned patient and oral care performed. Will continue to monitor.

## 2020-09-23 NOTE — INTERNAL MED PROGRESS NOTE
Subjective


Date of Service:  Sep 23, 2020


Physician Name


Sanya Schultz


Attending Physician


Chano Cherry MD





Current Medications








 Medications


  (Trade)  Dose


 Ordered  Sig/Didi


 Route


 PRN Reason  Start Time


 Stop Time Status Last Admin


Dose Admin


 


 Acetaminophen


  (Tylenol)  650 mg  Q4H  PRN


 GT


 FEVER  8/26/20 11:45


 9/25/20 11:44  9/23/20 00:20





 


 Acetaminophen


  (Tylenol)  650 mg  Q4H  PRN


 GT


 For Pain  9/23/20 17:15


 10/23/20 17:14  9/23/20 17:23





 


 Chlorhexidine


 Gluconate


  (Beatrice-Hex 2%)  1 applic  DAILY@2000


 TOPIC


   8/31/20 20:00


 11/29/20 19:59  9/22/20 20:55





 


 Clotrimazole


  (Lotrimin)  1 applic  Q12HR


 TOPIC


   8/30/20 13:00


 11/28/20 12:59  9/23/20 08:31





 


 Dextrose


  (Dextrose 50%)  25 ml  Q30M  PRN


 IV


 Hypoglycemia  8/26/20 11:30


 11/20/20 11:29   





 


 Dextrose


  (Dextrose 50%)  50 ml  Q30M  PRN


 IV


 Hypoglycemia  8/26/20 11:30


 11/20/20 11:29   





 


 Dopamine HCl/


 Dextrose  250 ml @ 0


 mls/hr  Q24H


 IV


   9/4/20 11:15


 9/25/20 23:59  9/6/20 19:47





 


 Hydrocortisone


  (Solu-CORTEF)  100 mg  EVERY 8  HOURS


 IV


   8/30/20 14:00


 11/28/20 13:59  9/23/20 13:14





 


 Insulin Aspart


  (NovoLOG)    Q6HR


 SUBQ


   9/18/20 12:00


 12/17/20 11:59  9/23/20 17:33





 


 Insulin Detemir


  (Levemir)  10 units  Q12HR


 SUBQ


   9/18/20 10:00


 12/17/20 09:59  9/23/20 09:06





 


 Loperamide HCl


  (Imodium)  2 mg  Q6H  PRN


 NG


 Diarrhea  9/16/20 10:30


 10/16/20 10:29  9/23/20 11:41





 


 Midodrine


  (Pro-Amatine)  10 mg  Q8HR


 GT


   8/28/20 14:00


 11/26/20 13:59  9/23/20 13:14





 


 Norepinephrine


 Bitartrate 16 mg/


 Dextrose  500 ml @ 0


 mls/hr  Q24H


 IV


   8/31/20 07:45


 9/25/20 23:59  9/4/20 08:43





 


 Phenylephrine HCl


 50 mg/Dextrose  250 ml @ 0


 mls/hr  Q24H  PRN


 IV


 For hypotension  8/29/20 17:45


 9/25/20 23:59  8/31/20 01:49





 


 Potassium Chloride


  (K-Dur)  40 meq  TWICE A  DAY


 GT


   9/20/20 12:15


 12/19/20 12:14  9/23/20 17:24











Allergies:  


Coded Allergies:  


     No Known Allergies (Unverified , 1/8/19)


ROS Limited/Unobtainable:  Yes


Subjective


60 YO F with Down's syndrome admitted with hypoxia.  Now sepsis and pneumonia.  

Cover for Int Med-DR Hung.  ICU.  Intubated and sedated





Objective





Last Vital Signs








  Date Time  Temp Pulse Resp B/P (MAP) Pulse Ox O2 Delivery O2 Flow Rate FiO2


 


9/23/20 18:00 98.5 67 28 148/75 (99) 96   


 


9/23/20 16:00        80


 


9/23/20 16:00      Mechanical Ventilator  





      Mechanical Ventilator  











Laboratory Tests








Test


 9/22/20


23:59 9/23/20


04:59 9/23/20


17:27


 


POC Whole Blood Glucose


 Pending  


 


 180 MG/DL


()  H


 


White Blood Count


 


 9.9 K/UL


(4.8-10.8) 





 


Red Blood Count


 


 2.51 M/UL


(4.20-5.40)  L 





 


Hemoglobin


 


 7.8 G/DL


(12.0-16.0)  L 





 


Hematocrit


 


 23.8 %


(37.0-47.0)  L 





 


Mean Corpuscular Volume  95 FL (80-99)   


 


Mean Corpuscular Hemoglobin


 


 31.0 PG


(27.0-31.0) 





 


Mean Corpuscular Hemoglobin


Concent 


 32.7 G/DL


(32.0-36.0) 





 


Red Cell Distribution Width


 


 19.0 %


(11.6-14.8)  H 





 


Platelet Count


 


 63 K/UL


(150-450)  L 





 


Mean Platelet Volume


 


 9.3 FL


(6.5-10.1) 





 


Neutrophils (%) (Auto)


 


 % (45.0-75.0)


 





 


Lymphocytes (%) (Auto)


 


 % (20.0-45.0)


 





 


Monocytes (%) (Auto)   % (1.0-10.0)   


 


Eosinophils (%) (Auto)   % (0.0-3.0)   


 


Basophils (%) (Auto)   % (0.0-2.0)   


 


Differential Total Cells


Counted 


 100  


 





 


Neutrophils % (Manual)  92 % (45-75)  H 


 


Lymphocytes % (Manual)  7 % (20-45)  L 


 


Monocytes % (Manual)  1 % (1-10)   


 


Eosinophils % (Manual)  0 % (0-3)   


 


Basophils % (Manual)  0 % (0-2)   


 


Band Neutrophils  0 % (0-8)   


 


Platelet Estimate  Decreased  L 


 


Platelet Morphology  Normal   


 


Anisocytosis  1+   


 


Prothrombin Time


 


 13.0 SEC


(9.30-11.50)  H 





 


Prothromb Time International


Ratio 


 1.2 (0.9-1.1)


H 





 


Activated Partial


Thromboplast Time 


 28 SEC (23-33)


 





 


Sodium Level


 


 149 MMOL/L


(136-145)  H 





 


Potassium Level


 


 3.7 MMOL/L


(3.5-5.1) 





 


Chloride Level


 


 115 MMOL/L


()  H 





 


Carbon Dioxide Level


 


 28 MMOL/L


(21-32) 





 


Anion Gap


 


 6 mmol/L


(5-15) 





 


Blood Urea Nitrogen


 


 29 mg/dL


(7-18)  H 





 


Creatinine


 


 0.8 MG/DL


(0.55-1.30) 





 


Estimat Glomerular Filtration


Rate 


 > 60 mL/min


(>60) 





 


Glucose Level


 


 170 MG/DL


()  H 





 


Calcium Level


 


 7.8 MG/DL


(8.5-10.1)  L 





 


Phosphorus Level


 


 3.2 MG/DL


(2.5-4.9) 





 


Magnesium Level


 


 1.9 MG/DL


(1.8-2.4) 





 


Total Bilirubin


 


 0.6 MG/DL


(0.2-1.0) 





 


Aspartate Amino Transf


(AST/SGOT) 


 34 U/L (15-37)


 





 


Alanine Aminotransferase


(ALT/SGPT) 


 49 U/L (12-78)


 





 


Alkaline Phosphatase


 


 45 U/L


()  L 





 


Total Protein


 


 4.1 G/DL


(6.4-8.2)  L 





 


Albumin


 


 1.1 G/DL


(3.4-5.0)  L 





 


Globulin  3.0 g/dL   


 


Albumin/Globulin Ratio


 


 0.4 (1.0-2.7)


L 




















Intake and Output  


 


 9/22/20 9/23/20





 19:00 07:00


 


Intake Total 725 ml 900 ml


 


Output Total 1325 ml 1620 ml


 


Balance -600 ml -720 ml


 


  


 


Free Water  300 ml


 


Tube Feeding 600 ml 600 ml


 


Other 125 ml 


 


Output Urine Total 1125 ml 1320 ml


 


Chest Tube Drainage Total 200 ml 300 ml


 


# Bowel Movements 3 4








Objective


General Appearance:  WD/WN, no apparent distress, alert


EENT:  PERRL/EOMI, normal ENT inspection


Neck:  non-tender, normal alignment, supple, normal inspection


Cardiovascular:  normal peripheral pulses, normal rate, regular rhythm, no 

gallop/murmur, no JVD


Respiratory/Chest:  Mech vent; decreased breath sounds, crackles/rales, rhonchi 

- bilaterally, expiratory wheezing


Abdomen:  normal bowel sounds, non tender, soft, no organomegaly, no mass


Extremities:  normal range of motion


Neurologic:  CNs II-XII grossly normal


Skin:  normal pigmentation, warm/dry





Assessment/Plan


Problem List:  


(1) HCAP (healthcare-associated pneumonia)


Assessment & Plan:  Strep Group G.  Continue amikacin per ID=Dr Gomes.  

Pulmonary/Critical care=DR Cherry.  COVID NEG





(2) Sepsis


Assessment & Plan:  Staph haemolyticus.  Continue amikacin per ID=Dr Gomes





(3) Down's syndrome


(4) Dysphagia


Assessment & Plan:  S/P PEG





(5) Seizure disorder


Assessment & Plan:  Continue keppra and depakote





(6) Hypothyroidism


Assessment & Plan:  Continue synthroid





(7) Acute respiratory failure


Assessment & Plan[facility]  [facility]














Sanya Schultz MD               Sep 23, 2020 19:04

## 2020-09-23 NOTE — NUR
RESPIRATORY NOTE:

Received pt on AC 26, 500VT, 80%, no PEEP. Pt intubated w/ ETT 7.5 @ 22cm lipline, secured 
by anchorfast. Pt is awake/disoriented, responds to stimuli. B/S sushil. rhonchi, sxn small 
amounts of thick/thin, pale-yellow secretions. Vent plugged into red outlet, [place] at 
bedside. Pt in no apparent distress at this time. Will continue to monitor pt.

## 2020-09-23 NOTE — NUR
NURSE NOTES:

SBAR from [facility]. Pt is sleeping able to wake up to light touch. Unable to follow 
commands. SB to NSR on cardiac monitor in HR 60s. ETT 7.5/ 22cm at lip line. AC 26, , 
FiO2 80%, no peep. O2 sat 97%. Right chest tube is draining serous yellow liquid. Valle in 
place draining to yellow urine. GT in place receiving Vital AF 1.2 at 50cc/hr. No residual 
noted. CARLOS PICC line patient and asymptomatic, open for TKO. Bed in lowest position. Side 
rails up x3. Will resume plan of care.

## 2020-09-23 NOTE — NUR
CASE MANAGEMENT: REVIEW



09/23/20



SI: DAVID . RESP FAILURE . DOWN'S SYNDROME . RENAL FAILURE / THROMBOCYTOPENIA 

100.7   69   21   128/58   98% MECH VENT FIO2 80

H/H 7.8/23.8   PLT 63  NA+ 149   BUN 29   CA+ 7.8   ALB 1.1 



IS: K-DUR GT BID

DOPAMINE IV Q24HR~TITRATE

LEVOPHED IV Q24HR~TITRATE 

SOLU CORTEF IV Q8HR

MIDODRINE GT Q8HR 

LEVEMIR SQ BID



***ICU STATUS***

D[facility] 

PLAN: 

NEED TWO MD TO CONSENT FOR TRACH

## 2020-09-23 NOTE — NUR
NURSE NOTES:



Notified Dr Knox that patient still has continuos bubbling in water weal chamber even with 
peep of 0. Order received, noted and carried out.

## 2020-09-23 NOTE — NUR
NURSE NOTES:



Started 1 unit pRBC transfusion. Cosigned with LAYTON Nolasco. Pre-transfusion temp 99.3, HR 65, 
/69. Will continue to monitor.

## 2020-09-23 NOTE — NUR
NURSE NOTES:



Report received from LAYTON Sloan. Pt is lethargic. Able to wake up to light touch. Unable to 
follow commands. SB on cardiac monitor in HR 50's. ETT 7.5/ 22cm at lip line. AC 26, , 
FiO2 70%, no peep. O2 sat 93-96%. Right chest tube is draining serous yellow liquid. Valle 
in place draining to yellow urine. GT in place receiving Vital AF 1.2 at 50cc/hr. No 
residual noted. CARLOS PICC line patient and asymptomatic, open for TKO. Bed in lowest 
position. Side rails up x3. Will resume plan of care.

## 2020-09-23 NOTE — CARDIOLOGY PROGRESS NOTE
Assessment/Plan


Assessment/Plan


sepsis


respiratory failure 


ards 


renal insuf 


bacteremia


abn cardiac enzyme due to demand 


sinus arnold stable 


thrombocytopenia resolved  


hypernatremia 


pneumothorax now s/p chest tube 











vent support 


abx 


wbc stabel hgb lower 


3rd spacing alot 


cxr pers reviewed  not seem changed 


has air leak 


tsh seems fine 


sig edema with hypernatremia na improved 


remains off pressor still at this time 


hr inthe 40's-70's no sig pauses on tele 


tele personally reviewed 


weanign to lower fio2 and now off peep 


possible trach soon 


watch hgb





Subjective


ROS Limited/Unobtainable:  Yes


Subjective


on  a vent not communicative sleeeping





Objective





Last 24 Hour Vital Signs








  Date Time  Temp Pulse Resp B/P (MAP) Pulse Ox O2 Delivery O2 Flow Rate FiO2


 


9/23/20 18:00 98.5 67 28 148/75 (99) 96   


 


9/23/20 17:00  63 34 137/63 (87) 91   


 


9/23/20 16:00        80


 


9/23/20 16:00 99.2 71 28 146/68 (94) 94   


 


9/23/20 16:00      Mechanical Ventilator  





      Mechanical Ventilator  


 


9/23/20 16:00  59 38 142/66 (91) 92   


 


9/23/20 15:12  52      


 


9/23/20 15:10  48 26     80


 


9/23/20 15:00  59 38 142/66 (91) 92   


 


9/23/20 14:00  57 28 151/77 (101) 93   


 


9/23/20 13:00  54 26 120/66 (84) 94   


 


9/23/20 12:04  72      


 


9/23/20 12:00      Mechanical Ventilator  





      Mechanical Ventilator  


 


9/23/20 12:00 99.3 62 28 138/67 (90) 95   


 


9/23/20 11:20        80


 


9/23/20 11:16  69 26     80


 


9/23/20 11:00  58 26 138/62 (87) 91   


 


9/23/20 10:00  55 26 129/77 (94) 92   


 


9/23/20 09:00 99.0 59 26 130/53 (78) 92   


 


9/23/20 08:06  56      


 


9/23/20 08:00        70


 


9/23/20 08:00      Mechanical Ventilator  





      Mechanical Ventilator  


 


9/23/20 08:00  59 26 131/57 (81) 92   


 


9/23/20 07:00  60 28 129/63 (85) 92   


 


9/23/20 06:45  66 26     70


 


9/23/20 06:00  69 27 125/66 (85) 89   


 


9/23/20 05:58    117/56    


 


9/23/20 05:00  63 25 117/56 (76) 92   


 


9/23/20 04:00      Mechanical Ventilator  





      Mechanical Ventilator  


 


9/23/20 04:00  60      


 


9/23/20 04:00 98.6 62 37 113/79 (90) 94   


 


9/23/20 03:00  66 32 114/53 (73) 93   


 


9/23/20 03:00  66 32 114/53 (73) 93   


 


9/23/20 02:30  63 26     70


 


9/23/20 02:00        70


 


9/23/20 02:00  64 26 123/58 (79) 96   


 


9/23/20 01:00 99.8 58 26 120/57 (78) 98   


 


9/23/20 00:50 100.7       


 


9/23/20 00:00        80


 


9/23/20 00:00 100.7 69 21 128/58 (81) 98   


 


9/23/20 00:00      Mechanical Ventilator  





      Mechanical Ventilator  


 


9/23/20 00:00  63      


 


9/22/20 23:00  65 24 118/62 (80) 96   


 


9/22/20 22:30  59 26     80


 


9/22/20 22:00  55 26 112/86 (95) 99   


 


9/22/20 21:00  63      


 


9/22/20 21:00  68 16 101/55 (70) 98   


 


9/22/20 20:00        80


 


9/22/20 20:00 98.8 65 15 109/54 (72) 99   


 


9/22/20 20:00      Mechanical Ventilator  





      Mechanical Ventilator  


 


9/22/20 19:01 98.3 65 24 104/53 (70) 98   








General Appearance:  no apparent distress, on vent, patient on isolation, 

isolation precautions


Neck:  supple


Cardiovascular:  normal rate


Respiratory/Chest:  crackles/rales - anternmro,ly left side upper


Abdomen:  normal bowel sounds, non tender, soft


Extremities:  moderate edema











Intake and Output  


 


 9/22/20 9/23/20





 19:00 07:00


 


Intake Total 725 ml 900 ml


 


Output Total 1325 ml 1620 ml


 


Balance -600 ml -720 ml


 


  


 


Free Water  300 ml


 


Tube Feeding 600 ml 600 ml


 


Other 125 ml 


 


Output Urine Total 1125 ml 1320 ml


 


Chest Tube Drainage Total 200 ml 300 ml


 


# Bowel Movements 3 4











Laboratory Tests








Test


 9/22/20


23:59 9/23/20


04:59 9/23/20


17:27


 


POC Whole Blood Glucose


 Pending  


 


 180 MG/DL


()  H


 


White Blood Count


 


 9.9 K/UL


(4.8-10.8) 





 


Red Blood Count


 


 2.51 M/UL


(4.20-5.40)  L 





 


Hemoglobin


 


 7.8 G/DL


(12.0-16.0)  L 





 


Hematocrit


 


 23.8 %


(37.0-47.0)  L 





 


Mean Corpuscular Volume  95 FL (80-99)   


 


Mean Corpuscular Hemoglobin


 


 31.0 PG


(27.0-31.0) 





 


Mean Corpuscular Hemoglobin


Concent 


 32.7 G/DL


(32.0-36.0) 





 


Red Cell Distribution Width


 


 19.0 %


(11.6-14.8)  H 





 


Platelet Count


 


 63 K/UL


(150-450)  L 





 


Mean Platelet Volume


 


 9.3 FL


(6.5-10.1) 





 


Neutrophils (%) (Auto)


 


 % (45.0-75.0)


 





 


Lymphocytes (%) (Auto)


 


 % (20.0-45.0)


 





 


Monocytes (%) (Auto)   % (1.0-10.0)   


 


Eosinophils (%) (Auto)   % (0.0-3.0)   


 


Basophils (%) (Auto)   % (0.0-2.0)   


 


Differential Total Cells


Counted 


 100  


 





 


Neutrophils % (Manual)  92 % (45-75)  H 


 


Lymphocytes % (Manual)  7 % (20-45)  L 


 


Monocytes % (Manual)  1 % (1-10)   


 


Eosinophils % (Manual)  0 % (0-3)   


 


Basophils % (Manual)  0 % (0-2)   


 


Band Neutrophils  0 % (0-8)   


 


Platelet Estimate  Decreased  L 


 


Platelet Morphology  Normal   


 


Anisocytosis  1+   


 


Prothrombin Time


 


 13.0 SEC


(9.30-11.50)  H 





 


Prothromb Time International


Ratio 


 1.2 (0.9-1.1)


H 





 


Activated Partial


Thromboplast Time 


 28 SEC (23-33)


 





 


Sodium Level


 


 149 MMOL/L


(136-145)  H 





 


Potassium Level


 


 3.7 MMOL/L


(3.5-5.1) 





 


Chloride Level


 


 115 MMOL/L


()  H 





 


Carbon Dioxide Level


 


 28 MMOL/L


(21-32) 





 


Anion Gap


 


 6 mmol/L


(5-15) 





 


Blood Urea Nitrogen


 


 29 mg/dL


(7-18)  H 





 


Creatinine


 


 0.8 MG/DL


(0.55-1.30) 





 


Estimat Glomerular Filtration


Rate 


 > 60 mL/min


(>60) 





 


Glucose Level


 


 170 MG/DL


()  H 





 


Calcium Level


 


 7.8 MG/DL


(8.5-10.1)  L 





 


Phosphorus Level


 


 3.2 MG/DL


(2.5-4.9) 





 


Magnesium Level


 


 1.9 MG/DL


(1.8-2.4) 





 


Total Bilirubin


 


 0.6 MG/DL


(0.2-1.0) 





 


Aspartate Amino Transf


(AST/SGOT) 


 34 U/L (15-37)


 





 


Alanine Aminotransferase


(ALT/SGPT) 


 49 U/L (12-78)


 





 


Alkaline Phosphatase


 


 45 U/L


()  L 





 


Total Protein


 


 4.1 G/DL


(6.4-8.2)  L 





 


Albumin


 


 1.1 G/DL


(3.4-5.0)  L 





 


Globulin  3.0 g/dL   


 


Albumin/Globulin Ratio


 


 0.4 (1.0-2.7)


L 




















Constantine Jorgensen MD          Sep 23, 2020 18:59

## 2020-09-24 VITALS — DIASTOLIC BLOOD PRESSURE: 69 MMHG | SYSTOLIC BLOOD PRESSURE: 146 MMHG

## 2020-09-24 VITALS — SYSTOLIC BLOOD PRESSURE: 125 MMHG | DIASTOLIC BLOOD PRESSURE: 65 MMHG

## 2020-09-24 VITALS — SYSTOLIC BLOOD PRESSURE: 171 MMHG | DIASTOLIC BLOOD PRESSURE: 79 MMHG

## 2020-09-24 VITALS — DIASTOLIC BLOOD PRESSURE: 84 MMHG | SYSTOLIC BLOOD PRESSURE: 163 MMHG

## 2020-09-24 VITALS — DIASTOLIC BLOOD PRESSURE: 71 MMHG | SYSTOLIC BLOOD PRESSURE: 144 MMHG

## 2020-09-24 VITALS — DIASTOLIC BLOOD PRESSURE: 77 MMHG | SYSTOLIC BLOOD PRESSURE: 148 MMHG

## 2020-09-24 VITALS — SYSTOLIC BLOOD PRESSURE: 167 MMHG | DIASTOLIC BLOOD PRESSURE: 74 MMHG

## 2020-09-24 VITALS — SYSTOLIC BLOOD PRESSURE: 144 MMHG | DIASTOLIC BLOOD PRESSURE: 69 MMHG

## 2020-09-24 VITALS — SYSTOLIC BLOOD PRESSURE: 154 MMHG | DIASTOLIC BLOOD PRESSURE: 82 MMHG

## 2020-09-24 VITALS — SYSTOLIC BLOOD PRESSURE: 130 MMHG | DIASTOLIC BLOOD PRESSURE: 66 MMHG

## 2020-09-24 VITALS — DIASTOLIC BLOOD PRESSURE: 75 MMHG | SYSTOLIC BLOOD PRESSURE: 133 MMHG

## 2020-09-24 VITALS — SYSTOLIC BLOOD PRESSURE: 163 MMHG | DIASTOLIC BLOOD PRESSURE: 70 MMHG

## 2020-09-24 VITALS — SYSTOLIC BLOOD PRESSURE: 160 MMHG | DIASTOLIC BLOOD PRESSURE: 72 MMHG

## 2020-09-24 VITALS — DIASTOLIC BLOOD PRESSURE: 69 MMHG | SYSTOLIC BLOOD PRESSURE: 125 MMHG

## 2020-09-24 VITALS — DIASTOLIC BLOOD PRESSURE: 70 MMHG | SYSTOLIC BLOOD PRESSURE: 135 MMHG

## 2020-09-24 VITALS — SYSTOLIC BLOOD PRESSURE: 130 MMHG | DIASTOLIC BLOOD PRESSURE: 75 MMHG

## 2020-09-24 VITALS — SYSTOLIC BLOOD PRESSURE: 126 MMHG | DIASTOLIC BLOOD PRESSURE: 68 MMHG

## 2020-09-24 VITALS — DIASTOLIC BLOOD PRESSURE: 72 MMHG | SYSTOLIC BLOOD PRESSURE: 149 MMHG

## 2020-09-24 VITALS — SYSTOLIC BLOOD PRESSURE: 142 MMHG | DIASTOLIC BLOOD PRESSURE: 80 MMHG

## 2020-09-24 VITALS — SYSTOLIC BLOOD PRESSURE: 169 MMHG | DIASTOLIC BLOOD PRESSURE: 79 MMHG

## 2020-09-24 VITALS — SYSTOLIC BLOOD PRESSURE: 153 MMHG | DIASTOLIC BLOOD PRESSURE: 78 MMHG

## 2020-09-24 VITALS — SYSTOLIC BLOOD PRESSURE: 168 MMHG | DIASTOLIC BLOOD PRESSURE: 78 MMHG

## 2020-09-24 VITALS — DIASTOLIC BLOOD PRESSURE: 70 MMHG | SYSTOLIC BLOOD PRESSURE: 157 MMHG

## 2020-09-24 VITALS — DIASTOLIC BLOOD PRESSURE: 75 MMHG | SYSTOLIC BLOOD PRESSURE: 169 MMHG

## 2020-09-24 LAB
ADD MANUAL DIFF: YES
ALBUMIN SERPL-MCNC: 1.2 G/DL (ref 3.4–5)
ALBUMIN/GLOB SERPL: 0.4 {RATIO} (ref 1–2.7)
ALP SERPL-CCNC: 55 U/L (ref 46–116)
ALT SERPL-CCNC: 52 U/L (ref 12–78)
ANION GAP SERPL CALC-SCNC: 7 MMOL/L (ref 5–15)
AST SERPL-CCNC: 27 U/L (ref 15–37)
BILIRUB SERPL-MCNC: 0.7 MG/DL (ref 0.2–1)
BUN SERPL-MCNC: 30 MG/DL (ref 7–18)
CALCIUM SERPL-MCNC: 7.6 MG/DL (ref 8.5–10.1)
CHLORIDE SERPL-SCNC: 113 MMOL/L (ref 98–107)
CO2 SERPL-SCNC: 29 MMOL/L (ref 21–32)
CREAT SERPL-MCNC: 0.7 MG/DL (ref 0.55–1.3)
ERYTHROCYTE [DISTWIDTH] IN BLOOD BY AUTOMATED COUNT: 17.5 % (ref 11.6–14.8)
GLOBULIN SER-MCNC: 3.4 G/DL
HCT VFR BLD CALC: 29.2 % (ref 37–47)
HGB BLD-MCNC: 10 G/DL (ref 12–16)
MCV RBC AUTO: 94 FL (ref 80–99)
PHOSPHATE SERPL-MCNC: 2.3 MG/DL (ref 2.5–4.9)
PLATELET # BLD: 78 K/UL (ref 150–450)
POTASSIUM SERPL-SCNC: 3.8 MMOL/L (ref 3.5–5.1)
RBC # BLD AUTO: 3.11 M/UL (ref 4.2–5.4)
SODIUM SERPL-SCNC: 149 MMOL/L (ref 136–145)
WBC # BLD AUTO: 10.4 K/UL (ref 4.8–10.8)

## 2020-09-24 RX ADMIN — DOPAMINE HYDROCHLORIDE IN DEXTROSE SCH MLS/HR: 1.6 INJECTION, SOLUTION INTRAVENOUS at 10:41

## 2020-09-24 RX ADMIN — MIDODRINE HYDROCHLORIDE SCH MG: 10 TABLET ORAL at 05:31

## 2020-09-24 RX ADMIN — SODIUM CHLORIDE SCH MLS/HR: 900 INJECTION, SOLUTION INTRAVENOUS at 05:31

## 2020-09-24 RX ADMIN — HYDROCORTISONE SODIUM SUCCINATE SCH MG: 100 INJECTION, POWDER, FOR SOLUTION INTRAMUSCULAR; INTRAVENOUS at 13:46

## 2020-09-24 RX ADMIN — INSULIN ASPART SCH UNITS: 100 INJECTION, SOLUTION INTRAVENOUS; SUBCUTANEOUS at 11:37

## 2020-09-24 RX ADMIN — INSULIN ASPART SCH UNITS: 100 INJECTION, SOLUTION INTRAVENOUS; SUBCUTANEOUS at 05:31

## 2020-09-24 RX ADMIN — HYDROCORTISONE SODIUM SUCCINATE SCH MG: 100 INJECTION, POWDER, FOR SOLUTION INTRAMUSCULAR; INTRAVENOUS at 05:31

## 2020-09-24 RX ADMIN — CHLORHEXIDINE GLUCONATE SCH APPLIC: 213 SOLUTION TOPICAL at 20:56

## 2020-09-24 RX ADMIN — MIDODRINE HYDROCHLORIDE SCH MG: 10 TABLET ORAL at 22:02

## 2020-09-24 RX ADMIN — INSULIN DETEMIR SCH UNITS: 100 INJECTION, SOLUTION SUBCUTANEOUS at 08:40

## 2020-09-24 RX ADMIN — INSULIN ASPART SCH UNITS: 100 INJECTION, SOLUTION INTRAVENOUS; SUBCUTANEOUS at 18:06

## 2020-09-24 RX ADMIN — INSULIN DETEMIR SCH UNITS: 100 INJECTION, SOLUTION SUBCUTANEOUS at 21:06

## 2020-09-24 RX ADMIN — HYDROCORTISONE SODIUM SUCCINATE SCH MG: 100 INJECTION, POWDER, FOR SOLUTION INTRAMUSCULAR; INTRAVENOUS at 22:02

## 2020-09-24 RX ADMIN — MIDODRINE HYDROCHLORIDE SCH MG: 10 TABLET ORAL at 13:42

## 2020-09-24 NOTE — NUR
NURSE NOTES:

Patient is afebrile, axillary temperature 99.1. Turned and repositioned patient, will 
continue to monitor.

## 2020-09-24 NOTE — GENERAL PROGRESS NOTE
Subjective


ROS Limited/Unobtainable:  No


Allergies:  


Coded Allergies:  


     No Known Allergies (Unverified , 1/8/19)





Objective





Last 24 Hour Vital Signs








  Date Time  Temp Pulse Resp B/P (MAP) Pulse Ox O2 Delivery O2 Flow Rate FiO2


 


9/24/20 13:00  58 26 142/80 (100) 96   


 


9/24/20 12:00      Mechanical Ventilator  





      Mechanical Ventilator  


 


9/24/20 12:00        80


 


9/24/20 12:00 99.0 55 24 148/77 (100) 95   


 


9/24/20 11:23  99      


 


9/24/20 11:00  45 26 157/70 (99) 96   


 


9/24/20 10:50  46 26     80


 


9/24/20 10:00  44 26 173/79 (110) 96   


 


9/24/20 09:00  46 26 160/72 (101) 96   


 


9/24/20 08:00 99.8 51 25 171/79 (109) 95   


 


9/24/20 08:00      Mechanical Ventilator  





      Mechanical Ventilator  


 


9/24/20 08:00        80


 


9/24/20 07:38  43      


 


9/24/20 07:00  49 26 163/70 (101) 95   


 


9/24/20 07:00  44 26     80


 


9/24/20 06:00  54 26 133/75 (94) 95   


 


9/24/20 05:31    125/69    


 


9/24/20 05:00  60 26 169/75 (106) 97   


 


9/24/20 04:00      Mechanical Ventilator  





      Mechanical Ventilator  


 


9/24/20 04:00  55      


 


9/24/20 04:00        80


 


9/24/20 04:00 98.8 48 26 146/69 (94) 97   


 


9/24/20 03:00  49 25 130/75 (93) 96   


 


9/24/20 03:00  51 26     80


 


9/24/20 02:00  75 23 125/69 (87) 97   


 


9/24/20 01:00  63 46 163/84 (110) 95   


 


9/24/20 00:00      Mechanical Ventilator  





      Mechanical Ventilator  


 


9/24/20 00:00  66 39 168/78 (108) 97   


 


9/24/20 00:00        80


 


9/23/20 23:00  60 26 147/68 (94) 96   


 


9/23/20 22:49  60 26     80


 


9/23/20 22:00 99.0 51 26 174/75 (108) 95   


 


9/23/20 21:00 99.0       


 


9/23/20 21:00  58 33 167/117 (134) 97   


 


9/23/20 20:00      Mechanical Ventilator  





      Mechanical Ventilator  


 


9/23/20 20:00  61      


 


9/23/20 20:00        80


 


9/23/20 20:00 100.0 60 28 141/72 (95) 96   


 


9/23/20 19:05  63 26     80


 


9/23/20 19:00  62 26 150/71 (97) 97   


 


9/23/20 18:00 98.5 67 28 148/75 (99) 96   


 


9/23/20 17:00  63 34 137/63 (87) 91   


 


9/23/20 16:00        80


 


9/23/20 16:00 99.2 71 28 146/68 (94) 94   


 


9/23/20 16:00      Mechanical Ventilator  





      Mechanical Ventilator  


 


9/23/20 16:00  59 38 142/66 (91) 92   


 


9/23/20 15:12  52      


 


9/23/20 15:10  48 26     80


 


9/23/20 15:00  59 28 142/66 (91) 92   

















Intake and Output  


 


 9/23/20 9/24/20





 19:00 07:00


 


Intake Total 950 ml 660 ml


 


Output Total 845 ml 960 ml


 


Balance 105 ml -300 ml


 


  


 


Free Water  60 ml


 


Tube Feeding 600 ml 600 ml


 


Blood Product 250 ml 


 


Other 100 ml 


 


Output Urine Total 720 ml 900 ml


 


Chest Tube Drainage Total 125 ml 60 ml


 


# Bowel Movements 2 








Laboratory Tests


9/23/20 17:27: POC Whole Blood Glucose 180H


9/23/20 20:05: POC Whole Blood Glucose [Pending]


9/23/20 23:55: POC Whole Blood Glucose [Pending]


9/24/20 04:00: 


White Blood Count 10.4, Red Blood Count 3.11L, Hemoglobin 10.0L, Hematocrit 29.2

L, Mean Corpuscular Volume 94, Mean Corpuscular Hemoglobin 32.2H, Mean 

Corpuscular Hemoglobin Concent 34.3, Red Cell Distribution Width 17.5H, Platelet

 Count 78L, Mean Platelet Volume 9.7, Neutrophils (%) (Auto) , Lymphocytes (%) 

(Auto) , Monocytes (%) (Auto) , Eosinophils (%) (Auto) , Basophils (%) (Auto) , 

Differential Total Cells Counted 100, Neutrophils % (Manual) 91H, Lymphocytes % 

(Manual) 7L, Monocytes % (Manual) 2, Eosinophils % (Manual) 0, Basophils % 

(Manual) 0, Band Neutrophils 0, Platelet Estimate DecreasedL, Platelet 

Morphology Normal, Anisocytosis 1+, Sodium Level 149H, Potassium Level 3.8, 

Chloride Level 113H, Carbon Dioxide Level 29, Anion Gap 7, Blood Urea Nitrogen 

30H, Creatinine 0.7, Estimat Glomerular Filtration Rate > 60, Glucose Level 193H

, Calcium Level 7.6L, Phosphorus Level 2.3L, Magnesium Level 1.9, Total 

Bilirubin 0.7, Aspartate Amino Transf (AST/SGOT) 27, Alanine Aminotransferase 

(ALT/SGPT) 52, Alkaline Phosphatase 55, Total Protein 4.6L, Albumin 1.2L, 

Globulin 3.4, Albumin/Globulin Ratio 0.4L


9/24/20 05:29: POC Whole Blood Glucose [Pending]


9/24/20 08:39: 


Arterial Blood pH 7.510H, Arterial Blood Partial Pressure CO2 31.4L, Arterial 

Blood Partial Pressure O2 86.4, Arterial Blood HCO3 24.5, Arterial Blood Oxygen 

Saturation 96.5, Arterial Blood Base Excess 1.9, Cole Test Positive


Height (Feet):  5


Height (Inches):  3.00


Weight (Pounds):  172


General Appearance:  no apparent distress


EENT:  normal ENT inspection


Neck:  supple


Cardiovascular:  normal rate


Respiratory/Chest:  decreased breath sounds


Abdomen:  normal bowel sounds, non tender, soft


Extremities:  non-tender





Assessment/Plan


Status:  stable, not improved, unchanged


Assessment/Plan:


1. History of Down syndrome.


2. Dysphagia with G-tube.


3. Seizure disorder.


4. Hypothyroidism.


5. DAVID.


6. Pneumonia.


7. Sepsis.








fu H&H


prn blood transfusion to keep HGB above 7


ppi


GTF


hold GI procedures for now


stool ob + 1/2











Ted Nagy MD             Sep 24, 2020 14:30

## 2020-09-24 NOTE — DIAGNOSTIC IMAGING REPORT
Indication: Dyspnea

 

Technique: One view of the chest

 

Comparison: none

 

Findings: Endotracheal tube, right chest tube, left arm PICC remain. Bilateral

interstitial and airspace infiltrates are unchanged. There is no pneumothorax.

 

Impression: Negative

## 2020-09-24 NOTE — PULMONOLGY CRITICAL CARE NOTE
Critical Care - Asmt/Plan


Problems:  


(1) Acute respiratory failure


(2) Pneumothorax


(3) Bacteremia


(4) Pneumonia


(5) Sepsis


(6) HCAP (healthcare-associated pneumonia)


(7) Seizure disorder


(8) Down's syndrome


(9) Trisomy 21, Down syndrome


Respiratory:  monitor respiratory rate, adjust FIO2, CXR


Cardiac:  continue to monitor HR/BP


Renal:  F/U I&O, check electrolytes


Infectious Disease:  check cultures


Gastrointestinal:  continue feedings/current rate


Endocrine:  monitor blood sugar, check HgA1C


Hematologic:  monitor H/H


Neurologic:  PRN Morphine


Affect:  PRN ativan


Prophylaxis:  Heparin


Time Spent (Minutes):  40


Discussed with:  nurses, consultants





Critical Care - Objective





Last 24 Hour Vital Signs








  Date Time  Temp Pulse Resp B/P (MAP) Pulse Ox O2 Delivery O2 Flow Rate FiO2


 


9/24/20 09:00  46 26 160/72 (101) 96   


 


9/24/20 08:00 99.8 51 25 171/79 (109) 95   


 


9/24/20 08:00      Mechanical Ventilator  





      Mechanical Ventilator  


 


9/24/20 08:00        80


 


9/24/20 07:38  43      


 


9/24/20 07:00  49 26 163/70 (101) 95   


 


9/24/20 07:00  44 26     80


 


9/24/20 06:00  54 26 133/75 (94) 95   


 


9/24/20 05:31    125/69    


 


9/24/20 05:00  60 26 169/75 (106) 97   


 


9/24/20 04:00      Mechanical Ventilator  





      Mechanical Ventilator  


 


9/24/20 04:00  55      


 


9/24/20 04:00        80


 


9/24/20 04:00 98.8 48 26 146/69 (94) 97   


 


9/24/20 03:00  49 25 130/75 (93) 96   


 


9/24/20 03:00  51 26     80


 


9/24/20 02:00  75 23 125/69 (87) 97   


 


9/24/20 01:00  63 46 163/84 (110) 95   


 


9/24/20 00:00      Mechanical Ventilator  





      Mechanical Ventilator  


 


9/24/20 00:00  66 39 168/78 (108) 97   


 


9/24/20 00:00        80


 


9/23/20 23:00  60 26 147/68 (94) 96   


 


9/23/20 22:49  60 26     80


 


9/23/20 22:00 99.0 51 26 174/75 (108) 95   


 


9/23/20 21:00 99.0       


 


9/23/20 21:00  58 33 167/117 (134) 97   


 


9/23/20 20:00      Mechanical Ventilator  





      Mechanical Ventilator  


 


9/23/20 20:00  61      


 


9/23/20 20:00        80


 


9/23/20 20:00 100.0 60 28 141/72 (95) 96   


 


9/23/20 19:05  63 26     80


 


9/23/20 19:00  62 26 150/71 (97) 97   


 


9/23/20 18:00 98.5 67 28 148/75 (99) 96   


 


9/23/20 17:00  63 34 137/63 (87) 91   


 


9/23/20 16:00        80


 


9/23/20 16:00 99.2 71 28 146/68 (94) 94   


 


9/23/20 16:00      Mechanical Ventilator  





      Mechanical Ventilator  


 


9/23/20 16:00  59 38 142/66 (91) 92   


 


9/23/20 15:12  52      


 


9/23/20 15:10  48 26     80


 


9/23/20 15:00  59 28 142/66 (91) 92   


 


9/23/20 14:00  57 28 151/77 (101) 93   


 


9/23/20 13:00  54 26 120/66 (84) 94   


 


9/23/20 12:04  72      


 


9/23/20 12:00      Mechanical Ventilator  





      Mechanical Ventilator  


 


9/23/20 12:00 99.3 62 28 138/67 (90) 95   


 


9/23/20 11:20        80


 


9/23/20 11:16  69 26     80


 


9/23/20 11:00  58 26 138/62 (87) 91   


 


9/23/20 10:00  55 26 129/77 (94) 92   








Status:  sedated


Condition:  critical


HEENT:  atraumatic, normocephalic


Neck:  full ROM


Heart:  HR/BP stable, HR/BP unstable


Abdomen:  soft, non-tender


Extremities:  no C/C/E


Accucheck:  172





Critical Care - Subjective


ROS Limited/Unobtainable:  Yes


Condition:  critical


EKG Rhythm:  Sinus Rhythm


FI02:  80


Vent Support Breath Rate:  26


Vent Support Mode:  AC


Vent Tidal Volume:  500


Sputum Amount:  Small


PEEP:  0.0


PIP:  35


Tube Feeding Amount:  50


I&O:











Intake and Output  


 


 9/23/20 9/24/20





 19:00 07:00


 


Intake Total 950 ml 660 ml


 


Output Total 845 ml 960 ml


 


Balance 105 ml -300 ml


 


  


 


Free Water  60 ml


 


Tube Feeding 600 ml 600 ml


 


Blood Product 250 ml 


 


Other 100 ml 


 


Output Urine Total 720 ml 900 ml


 


Chest Tube Drainage Total 125 ml 60 ml


 


# Bowel Movements 2 








CXR:


extensive bilateral infiltrate, Chest tube in place


ET-Tube:  7.5


ET Position:  22


Labs:





Laboratory Tests








Test


 9/23/20


17:27 9/23/20


20:05 9/23/20


23:55 9/24/20


04:00


 


POC Whole Blood Glucose


 180 MG/DL


()  H Pending  


 Pending  


 





 


White Blood Count


 


 


 


 10.4 K/UL


(4.8-10.8)


 


Red Blood Count


 


 


 


 3.11 M/UL


(4.20-5.40)  L


 


Hemoglobin


 


 


 


 10.0 G/DL


(12.0-16.0)  L


 


Hematocrit


 


 


 


 29.2 %


(37.0-47.0)  L


 


Mean Corpuscular Volume    94 FL (80-99)  


 


Mean Corpuscular Hemoglobin


 


 


 


 32.2 PG


(27.0-31.0)  H


 


Mean Corpuscular Hemoglobin


Concent 


 


 


 34.3 G/DL


(32.0-36.0)


 


Red Cell Distribution Width


 


 


 


 17.5 %


(11.6-14.8)  H


 


Platelet Count


 


 


 


 78 K/UL


(150-450)  L


 


Mean Platelet Volume


 


 


 


 9.7 FL


(6.5-10.1)


 


Neutrophils (%) (Auto)


 


 


 


 % (45.0-75.0)





 


Lymphocytes (%) (Auto)


 


 


 


 % (20.0-45.0)





 


Monocytes (%) (Auto)     % (1.0-10.0)  


 


Eosinophils (%) (Auto)     % (0.0-3.0)  


 


Basophils (%) (Auto)     % (0.0-2.0)  


 


Differential Total Cells


Counted 


 


 


 100  





 


Neutrophils % (Manual)    91 % (45-75)  H


 


Lymphocytes % (Manual)    7 % (20-45)  L


 


Monocytes % (Manual)    2 % (1-10)  


 


Eosinophils % (Manual)    0 % (0-3)  


 


Basophils % (Manual)    0 % (0-2)  


 


Band Neutrophils    0 % (0-8)  


 


Platelet Estimate    Decreased  L


 


Platelet Morphology    Normal  


 


Anisocytosis    1+  


 


Sodium Level


 


 


 


 149 MMOL/L


(136-145)  H


 


Potassium Level


 


 


 


 3.8 MMOL/L


(3.5-5.1)


 


Chloride Level


 


 


 


 113 MMOL/L


()  H


 


Carbon Dioxide Level


 


 


 


 29 MMOL/L


(21-32)


 


Anion Gap


 


 


 


 7 mmol/L


(5-15)


 


Blood Urea Nitrogen


 


 


 


 30 mg/dL


(7-18)  H


 


Creatinine


 


 


 


 0.7 MG/DL


(0.55-1.30)


 


Estimat Glomerular Filtration


Rate 


 


 


 > 60 mL/min


(>60)


 


Glucose Level


 


 


 


 193 MG/DL


()  H


 


Calcium Level


 


 


 


 7.6 MG/DL


(8.5-10.1)  L


 


Phosphorus Level


 


 


 


 2.3 MG/DL


(2.5-4.9)  L


 


Magnesium Level


 


 


 


 1.9 MG/DL


(1.8-2.4)


 


Total Bilirubin


 


 


 


 0.7 MG/DL


(0.2-1.0)


 


Aspartate Amino Transf


(AST/SGOT) 


 


 


 27 U/L (15-37)





 


Alanine Aminotransferase


(ALT/SGPT) 


 


 


 52 U/L (12-78)





 


Alkaline Phosphatase


 


 


 


 55 U/L


()


 


Total Protein


 


 


 


 4.6 G/DL


(6.4-8.2)  L


 


Albumin


 


 


 


 1.2 G/DL


(3.4-5.0)  L


 


Globulin    3.4 g/dL  


 


Albumin/Globulin Ratio


 


 


 


 0.4 (1.0-2.7)


L


 


Test


 9/24/20


05:29 9/24/20


08:39 


 





 


POC Whole Blood Glucose Pending     


 


Arterial Blood pH


 


 7.510


(7.350-7.450) 


 





 


Arterial Blood Partial


Pressure CO2 


 31.4 mmHg


(35.0-45.0)  L 


 





 


Arterial Blood Partial


Pressure O2 


 86.4 mmHg


(75.0-100.0) 


 





 


Arterial Blood HCO3


 


 24.5 mmol/L


(22.0-26.0) 


 





 


Arterial Blood Oxygen


Saturation 


 96.5 %


() 


 





 


Arterial Blood Base Excess  1.9 (-2-2)    


 


Cole Test  Positive    

















Chano Cherry MD              Sep 24, 2020 09:57

## 2020-09-24 NOTE — NUR
NURSE NOTES:

Repositioned and suctioned patient. Clear yellow fluid coming from Chest tube, Chest tube 
remains secured properly. No respiratory distress noted. vitals remains stable. Will 
continue to monitor.

## 2020-09-24 NOTE — NUR
NURSE HAND-OFF REPORT: 



Latest Vital Signs: Temperature 99.5 , Pulse 57 , B/P 130 /66 , Respiratory Rate 24 , O2 SAT 
95 , Mechanical Ventilator, O2 Flow Rate 15.0 .  

Vital Sign Comment: 



EKG Rhythm: Sinus Bradycardia

Rhythm change?: N 

MD Notified?: RENU Carver MD Response: No New Orders Received



Latest Momin Fall Score: 70  

Fall Risk: High Risk 

Safety Measures: Call light Within Reach, Bed Alarm Zone 1, Side Rails Side Rails x3, Bed 
position Low and Locked.

Fall Precautions: 

Yellow Socks

Door Sign

Patient Fall Education



Report given to Nathalia TRAYLOR.

## 2020-09-24 NOTE — CARDIOLOGY PROGRESS NOTE
Assessment/Plan


Assessment/Plan


sepsis


respiratory failure 


ards 


renal insuf 


bacteremia


abn cardiac enzyme due to demand 


sinus arnold stable 


thrombocytopenia resolved  


hypernatremia 


pneumothorax now s/p chest tube 











vent support 


abx 


3rd spacing alot 


cxr pers reviewed  not seem changed 


has air leak still 


tsh seems fine 


sig edema with hypernatremia na improved 


remains off pressor still at this time 


hr inthe 60's s no sig pauses on tele 


tele personally reviewed 


weanign to lower fio2 and now off peep on 70% 


possible trach soon 


watch hgb  no up but not get prbc tx 


is urinashana santacruz per rn maybe mobilizing fluid on her own 








Subjective


ROS Limited/Unobtainable:  Yes


Subjective


on  a vent not communicative sleeeping





Objective





Last 24 Hour Vital Signs








  Date Time  Temp Pulse Resp B/P (MAP) Pulse Ox O2 Delivery O2 Flow Rate FiO2


 


9/24/20 19:00  57 24 130/66 (87) 95   


 


9/24/20 18:00  62 24 135/70 (91) 96   


 


9/24/20 17:00  53 26 149/72 (97) 95   


 


9/24/20 16:00 99.5 55 26 153/78 (103) 96   


 


9/24/20 16:00      Mechanical Ventilator  





      Mechanical Ventilator  


 


9/24/20 16:00  56      


 


9/24/20 16:00        80


 


9/24/20 15:05  58 26     80


 


9/24/20 15:00  49 26 154/82 (106) 96   


 


9/24/20 14:00  56 28 144/71 (95) 96   


 


9/24/20 13:00  58 26 142/80 (100) 96   


 


9/24/20 12:00      Mechanical Ventilator  





      Mechanical Ventilator  


 


9/24/20 12:00        80


 


9/24/20 12:00 99.0 55 24 148/77 (100) 95   


 


9/24/20 11:23  99      


 


9/24/20 11:00  45 26 157/70 (99) 96   


 


9/24/20 10:50  46 26     80


 


9/24/20 10:00  44 26 173/79 (110) 96   


 


9/24/20 09:00  46 26 160/72 (101) 96   


 


9/24/20 08:00 99.8 51 25 171/79 (109) 95   


 


9/24/20 08:00      Mechanical Ventilator  





      Mechanical Ventilator  


 


9/24/20 08:00        80


 


9/24/20 07:38  43      


 


9/24/20 07:00  49 26 163/70 (101) 95   


 


9/24/20 07:00  44 26     80


 


9/24/20 06:00  54 26 133/75 (94) 95   


 


9/24/20 05:31    125/69    


 


9/24/20 05:00  60 26 169/75 (106) 97   


 


9/24/20 04:00      Mechanical Ventilator  





      Mechanical Ventilator  


 


9/24/20 04:00  55      


 


9/24/20 04:00        80


 


9/24/20 04:00 98.8 48 26 146/69 (94) 97   


 


9/24/20 03:00  49 25 130/75 (93) 96   


 


9/24/20 03:00  51 26     80


 


9/24/20 02:00  75 23 125/69 (87) 97   


 


9/24/20 01:00  63 46 163/84 (110) 95   


 


9/24/20 00:00      Mechanical Ventilator  





      Mechanical Ventilator  


 


9/24/20 00:00  66 39 168/78 (108) 97   


 


9/24/20 00:00        80


 


9/23/20 23:00  60 26 147/68 (94) 96   


 


9/23/20 22:49  60 26     80


 


9/23/20 22:00 99.0 51 26 174/75 (108) 95   


 


9/23/20 21:00 99.0       


 


9/23/20 21:00  58 33 167/117 (134) 97   


 


9/23/20 20:00      Mechanical Ventilator  





      Mechanical Ventilator  


 


9/23/20 20:00  61      


 


9/23/20 20:00        80


 


9/23/20 20:00 100.0 60 28 141/72 (95) 96   








General Appearance:  no apparent distress, on vent, patient on isolation, 

isolation precautions


Cardiovascular:  normal rate


Respiratory/Chest:  rhonchi - bilaterally


Abdomen:  normal bowel sounds, non tender, soft


Extremities:  moderate edema











Intake and Output  


 


 9/23/20 9/24/20





 19:00 07:00


 


Intake Total 950 ml 660 ml


 


Output Total 845 ml 960 ml


 


Balance 105 ml -300 ml


 


  


 


Free Water  60 ml


 


Tube Feeding 600 ml 600 ml


 


Blood Product 250 ml 


 


Other 100 ml 


 


Output Urine Total 720 ml 900 ml


 


Chest Tube Drainage Total 125 ml 60 ml


 


# Bowel Movements 2 











Laboratory Tests








Test


 9/23/20


20:05 9/23/20


23:55 9/24/20


04:00 9/24/20


05:29


 


POC Whole Blood Glucose Pending   Pending    Pending  


 


White Blood Count


 


 


 10.4 K/UL


(4.8-10.8) 





 


Red Blood Count


 


 


 3.11 M/UL


(4.20-5.40)  L 





 


Hemoglobin


 


 


 10.0 G/DL


(12.0-16.0)  L 





 


Hematocrit


 


 


 29.2 %


(37.0-47.0)  L 





 


Mean Corpuscular Volume   94 FL (80-99)   


 


Mean Corpuscular Hemoglobin


 


 


 32.2 PG


(27.0-31.0)  H 





 


Mean Corpuscular Hemoglobin


Concent 


 


 34.3 G/DL


(32.0-36.0) 





 


Red Cell Distribution Width


 


 


 17.5 %


(11.6-14.8)  H 





 


Platelet Count


 


 


 78 K/UL


(150-450)  L 





 


Mean Platelet Volume


 


 


 9.7 FL


(6.5-10.1) 





 


Neutrophils (%) (Auto)


 


 


 % (45.0-75.0)


 





 


Lymphocytes (%) (Auto)


 


 


 % (20.0-45.0)


 





 


Monocytes (%) (Auto)    % (1.0-10.0)   


 


Eosinophils (%) (Auto)    % (0.0-3.0)   


 


Basophils (%) (Auto)    % (0.0-2.0)   


 


Differential Total Cells


Counted 


 


 100  


 





 


Neutrophils % (Manual)   91 % (45-75)  H 


 


Lymphocytes % (Manual)   7 % (20-45)  L 


 


Monocytes % (Manual)   2 % (1-10)   


 


Eosinophils % (Manual)   0 % (0-3)   


 


Basophils % (Manual)   0 % (0-2)   


 


Band Neutrophils   0 % (0-8)   


 


Platelet Estimate   Decreased  L 


 


Platelet Morphology   Normal   


 


Anisocytosis   1+   


 


Sodium Level


 


 


 149 MMOL/L


(136-145)  H 





 


Potassium Level


 


 


 3.8 MMOL/L


(3.5-5.1) 





 


Chloride Level


 


 


 113 MMOL/L


()  H 





 


Carbon Dioxide Level


 


 


 29 MMOL/L


(21-32) 





 


Anion Gap


 


 


 7 mmol/L


(5-15) 





 


Blood Urea Nitrogen


 


 


 30 mg/dL


(7-18)  H 





 


Creatinine


 


 


 0.7 MG/DL


(0.55-1.30) 





 


Estimat Glomerular Filtration


Rate 


 


 > 60 mL/min


(>60) 





 


Glucose Level


 


 


 193 MG/DL


()  H 





 


Calcium Level


 


 


 7.6 MG/DL


(8.5-10.1)  L 





 


Phosphorus Level


 


 


 2.3 MG/DL


(2.5-4.9)  L 





 


Magnesium Level


 


 


 1.9 MG/DL


(1.8-2.4) 





 


Total Bilirubin


 


 


 0.7 MG/DL


(0.2-1.0) 





 


Aspartate Amino Transf


(AST/SGOT) 


 


 27 U/L (15-37)


 





 


Alanine Aminotransferase


(ALT/SGPT) 


 


 52 U/L (12-78)


 





 


Alkaline Phosphatase


 


 


 55 U/L


() 





 


Total Protein


 


 


 4.6 G/DL


(6.4-8.2)  L 





 


Albumin


 


 


 1.2 G/DL


(3.4-5.0)  L 





 


Globulin   3.4 g/dL   


 


Albumin/Globulin Ratio


 


 


 0.4 (1.0-2.7)


L 





 


Test


 9/24/20


08:39 


 


 





 


Arterial Blood pH


 7.510


(7.350-7.450) 


 


 





 


Arterial Blood Partial


Pressure CO2 31.4 mmHg


(35.0-45.0)  L 


 


 





 


Arterial Blood Partial


Pressure O2 86.4 mmHg


(75.0-100.0) 


 


 





 


Arterial Blood HCO3


 24.5 mmol/L


(22.0-26.0) 


 


 





 


Arterial Blood Oxygen


Saturation 96.5 %


() 


 


 





 


Arterial Blood Base Excess 1.9 (-2-2)     


 


Cole Test Positive     

















Constantine Jorgensen MD          Sep 24, 2020 19:53

## 2020-09-24 NOTE — NUR
NURSE NOTES:

Received patient from Maximilian TRAYLOR. Patient is awake, alert and oriented x1. Sinus Bradycardia 
on the heart monitor, HR 52. Receiving oxygen via ET tube 7.5 22cm at the lip line, vent 
settings: AC 26, , FiO2 80%, PEEP 0. G-tube is intact and receiving Vital AF at 
50cc/hr. Left Upper arm PICC is patent and intact. Valle catheter is intact and draining. 
Right side chest tube is intact and draining. Bed  is locked, placed in lowest position, 
side rails up x3, bed alarm on, head of bed elevated. Will continue to monitor.

## 2020-09-24 NOTE — NEPHROLOGY PROGRESS NOTE
Assessment/Plan


Problem List:  


(1) DAVID (acute kidney injury)


(2) Respiratory failure requiring intubation


(3) Down's syndrome


(4) Seizure disorder


(5) Hypothyroidism


Assessment





Acute renal failure, likely due to hypotension


Acute respiratory distress, hypoxia


Seizure disorder


Hypothyroidism


Down syndrome


Full code











Fluid challenge with IV fluids and albumin


Midodrine for BP above 100 systolic


Check TSH level


Check


Correct level


Monitor renal parameters


Urine studies


Per orders


Plan


September 24: Lab reviewed.  Renal parameters stable.  Full code.  Intubated.  

Has right side chest tube.  Continue per consultants.


September 23: Lab reviewed.  Renal parameters stable.  Full code.  Has right 

chest tube.  Planning process for tracheostomy.  Defer to chest and general 

surgeon.


September 22: Labs reviewed.  Renal parameters stable.  Remains full code.  

Remains on ventilator.  Due for tracheostomy tomorrow.  Continue per 

consultants.  Patient has a right chest tube in place at this time.


September 21: Labs reviewed.  Electrolyte imbalances addressed and supplemented.

 Remains full code and on ventilator.  Continue to monitor renal parameters.  

Continue per consultants.


September 20: Labs reviewed.  Serum potassium again low today.  Potassium 

supplement IV and through GT given.  Patient remains full code and is on v

entilator.  Continue per consultants.  Continue to monitor electrolytes and 

renal parameters.


September 19: Labs reviewed.  Abnormal electrolyte addressed.  Remains full 

code.  Remains vented.


September 18: Day 27 of hospitalization.  Full code.  Labs reviewed.  Hemoglobin

down to 7.5.  Electrolyte abnormalities addressed and corrections ordered.  

Continue to monitor renal parameters.  Continue per consultants.  Start on 

Levemir for blood sugar management.  Questioning continuation of hydrocortisone?


September 17: Labs reviewed.  Potassium, phosphorus, hemoglobin, are all low.  

Potassium and phosphorus IV replacement given.  Continue to monitor electrolytes

and CBC.  Patient remains full code.


September 16: Lab reviewed.  Low phosphorus low magnesium and low potassium was 

addressed.  Hemoglobin drifting lower.  Continue per consultants.  Patient 

remains full code.


September 15: Labs reviewed.  Patient continues to be on ventilator.  D5W for 

high sodium and also potassium chloride intravenously as supplement given.  

Hemoglobin 8.4.  Continue to monitor electrolytes and renal parameters.


September 14: Labs reviewed.  Low potassium and high sodium noted.  Hemoglobin 

8.1 stable.  Aim to correct abnormal electrolyte.  Continue rest.  Will give 2 

boluses of D5W 500 cc.


September 13: Lab reviewed.  Abnormal electrolytes noted and addressed.


September 12: Labs reviewed.  Potassium supplement given.  Patient remains full 

code.  Continue per consultants.


September 11: Lab reviewed.  Electrolyte abnormalities addressed.  Continue per 

pulmonary and ID.


September 10: Lab reviewed.  Status unchanged.  Serum sodium 151 unchanged.  

Stable from renal standpoint of view.


September 9: Labs reviewed.  Status quo.  D5W 500 cc IV ordered.  Continue to 

monitor renal parameters.


September 8: Status quo.  Labs reviewed.  Overall condition unchanged.  Patient 

was transfused and hemoglobin higher.  Continue current management.  Patient 

remains full code.


September 7: Status quo.  Overall condition poor.  Very low albumin.  Edematous.

 Hypotensive.  Hemoglobin lower.  Anemia work-up ordered.  I favor transfusion 2

units of packed RBCs.  Patient remains full code.  I favor supportive care only.

 Will discuss.


September 6: Electrolyte abnormalities addressed.  Serum creatinine lower.  

Continue per current management.


September 5: Status unchanged.  Lab reviewed.  Serum potassium 2.7.  IV 

potassium chloride ordered.  Serum creatinine low at 1.6 stable.  Blood pressure

90s systolic


September 4: Status quo.  Labs reviewed.  Renal parameters stable.  Serum 

creatinine down to 1.6.  Medication list reviewed.  Continues to be on 

midodrine.  Continue per consultants.


September 3: Status quo.  Labs reviewed.  Electrolytes adjusted.  Serum 

creatinine down to 1.8.  Continue per consultants.


September 2: Status quo.  Labs reviewed.  Phosphorus supplement IV given.  Serum

creatinine 2.  Continue per consultants.


September 1: Requires less pressors.  Albumin bolus given.  1 dose of Lasix IV 

ordered as the patient severely edematous.  Patient serum albumin is very low.  

Continue per consultants.


August 31: Continues to be intubated.  Labs reviewed.  Serum creatinine 1.9 

unchanged.  Blood pressure more stable.  Off 1 of the pressors.  Continue to 

monitor renal parameters.  Continue per consultants.  Patient now on 

hydrocortisone 100 mg every 8 hours.  Will decrease IV fluid.  Normal saline 

down to 50 cc an hour.


August 30: Intubated.  Labs reviewed.  Creatinine 1.9 unchanged.  Continue same 

treatment plan.  Per consultants.  Overall poor prognosis since the patient 

remains on pressors and her pulmonary status is worsening.


August 29: Remains intubated.  Labs reviewed.  Creatinine 1.9.  Blood pressure 

systolic 90s.  Continue per consultants.


August 28: Remains intubated.  Labs reviewed.  Serum creatinine lower to 2.  

Vancomycin level lower.  Remains hypotensive on pressors.  Will increase 

midodrine to 10 mg every 8 hours.  Continue per consultants.  Continue to 

monitor renal parameters.


August 27: Patient now in ICU.  Intubated.  On pressors.  Labs reviewed.  Will 

increase midodrine.  Aim to keep blood pressure over 100 systolic.  Will give 

albumin bolus.  Will check vancomycin level which was elevated when checked 

previously on August 24.  Will monitor renal parameters.  Continue per 

consultants.





Subjective


ROS Limited/Unobtainable:  Yes





Objective


Objective





Last 24 Hour Vital Signs








  Date Time  Temp Pulse Resp B/P (MAP) Pulse Ox O2 Delivery O2 Flow Rate FiO2


 


9/24/20 09:00  46 26 160/72 (101) 96   


 


9/24/20 08:00 99.8 51 25 171/79 (109) 95   


 


9/24/20 08:00      Mechanical Ventilator  





      Mechanical Ventilator  


 


9/24/20 08:00        80


 


9/24/20 07:38  43      


 


9/24/20 07:00  49 26 163/70 (101) 95   


 


9/24/20 07:00  44 26     80


 


9/24/20 06:00  54 26 133/75 (94) 95   


 


9/24/20 05:31    125/69    


 


9/24/20 05:00  60 26 169/75 (106) 97   


 


9/24/20 04:00      Mechanical Ventilator  





      Mechanical Ventilator  


 


9/24/20 04:00  55      


 


9/24/20 04:00        80


 


9/24/20 04:00 98.8 48 26 146/69 (94) 97   


 


9/24/20 03:00  49 25 130/75 (93) 96   


 


9/24/20 03:00  51 26     80


 


9/24/20 02:00  75 23 125/69 (87) 97   


 


9/24/20 01:00  63 46 163/84 (110) 95   


 


9/24/20 00:00      Mechanical Ventilator  





      Mechanical Ventilator  


 


9/24/20 00:00  66 39 168/78 (108) 97   


 


9/24/20 00:00        80


 


9/23/20 23:00  60 26 147/68 (94) 96   


 


9/23/20 22:49  60 26     80


 


9/23/20 22:00 99.0 51 26 174/75 (108) 95   


 


9/23/20 21:00 99.0       


 


9/23/20 21:00  58 33 167/117 (134) 97   


 


9/23/20 20:00      Mechanical Ventilator  





      Mechanical Ventilator  


 


9/23/20 20:00  61      


 


9/23/20 20:00        80


 


9/23/20 20:00 100.0 60 28 141/72 (95) 96   


 


9/23/20 19:05  63 26     80


 


9/23/20 19:00  62 26 150/71 (97) 97   


 


9/23/20 18:00 98.5 67 28 148/75 (99) 96   


 


9/23/20 17:00  63 34 137/63 (87) 91   


 


9/23/20 16:00        80


 


9/23/20 16:00 99.2 71 28 146/68 (94) 94   


 


9/23/20 16:00      Mechanical Ventilator  





      Mechanical Ventilator  


 


9/23/20 16:00  59 38 142/66 (91) 92   


 


9/23/20 15:12  52      


 


9/23/20 15:10  48 26     80


 


9/23/20 15:00  59 28 142/66 (91) 92   


 


9/23/20 14:00  57 28 151/77 (101) 93   


 


9/23/20 13:00  54 26 120/66 (84) 94   


 


9/23/20 12:04  72      


 


9/23/20 12:00      Mechanical Ventilator  





      Mechanical Ventilator  


 


9/23/20 12:00 99.3 62 28 138/67 (90) 95   


 


9/23/20 11:20        80


 


9/23/20 11:16  69 26     80


 


9/23/20 11:00  58 26 138/62 (87) 91   


 


9/23/20 10:00  55 26 129/77 (94) 92   

















Intake and Output  


 


 9/23/20 9/24/20





 19:00 07:00


 


Intake Total 950 ml 660 ml


 


Output Total 845 ml 960 ml


 


Balance 105 ml -300 ml


 


  


 


Free Water  60 ml


 


Tube Feeding 600 ml 600 ml


 


Blood Product 250 ml 


 


Other 100 ml 


 


Output Urine Total 720 ml 900 ml


 


Chest Tube Drainage Total 125 ml 60 ml


 


# Bowel Movements 2 








Laboratory Tests


9/23/20 17:27: POC Whole Blood Glucose 180H


9/23/20 20:05: POC Whole Blood Glucose [Pending]


9/23/20 23:55: POC Whole Blood Glucose [Pending]


9/24/20 04:00: 


White Blood Count 10.4, Red Blood Count 3.11L, Hemoglobin 10.0L, Hematocrit 

29.2L, Mean Corpuscular Volume 94, Mean Corpuscular Hemoglobin 32.2H, Mean 

Corpuscular Hemoglobin Concent 34.3, Red Cell Distribution Width 17.5H, Platelet

 Count 78L, Mean Platelet Volume 9.7, Neutrophils (%) (Auto) , Lymphocytes (%) 

(Auto) , Monocytes (%) (Auto) , Eosinophils (%) (Auto) , Basophils (%) (Auto) , 

Differential Total Cells Counted 100, Neutrophils % (Manual) 91H, Lymphocytes % 

(Manual) 7L, Monocytes % (Manual) 2, Eosinophils % (Manual) 0, Basophils % 

(Manual) 0, Band Neutrophils 0, Platelet Estimate DecreasedL, Platelet 

Morphology Normal, Anisocytosis 1+, Sodium Level 149H, Potassium Level 3.8, 

Chloride Level 113H, Carbon Dioxide Level 29, Anion Gap 7, Blood Urea Nitrogen 

30H, Creatinine 0.7, Estimat Glomerular Filtration Rate > 60, Glucose Level 193H

, Calcium Level 7.6L, Phosphorus Level 2.3L, Magnesium Level 1.9, Total 

Bilirubin 0.7, Aspartate Amino Transf (AST/SGOT) 27, Alanine Aminotransferase 

(ALT/SGPT) 52, Alkaline Phosphatase 55, Total Protein 4.6L, Albumin 1.2L, 

Globulin 3.4, Albumin/Globulin Ratio 0.4L


9/24/20 05:29: POC Whole Blood Glucose [Pending]


9/24/20 08:39: 


Arterial Blood pH 7.510H, Arterial Blood Partial Pressure CO2 31.4L, Arterial 

Blood Partial Pressure O2 86.4, Arterial Blood HCO3 24.5, Arterial Blood Oxygen 

Saturation 96.5, Arterial Blood Base Excess 1.9, Cole Test Positive


Height (Feet):  5


Height (Inches):  3.00


Weight (Pounds):  172


General Appearance:  no apparent distress


EENT:  other - Intubated on ventilator


Cardiovascular:  bradycardia


Respiratory/Chest:  decreased breath sounds, other - Right-sided chest tube











Lam Nino MD            Sep 24, 2020 09:35

## 2020-09-24 NOTE — NUR
NURSE NOTES:

Turned and repositioned patient, Right side chest tube connected to low intermittent 
suctioning. Patient's temperature read 99.8 degrees via axillary, removed extra blankets 
from patient. Medications given as prescribed, will continue to monitor.

## 2020-09-24 NOTE — INFECTIOUS DISEASES PROG NOTE
Assessment/Plan








ASSESSMENT:


sp code blue 8/26


Septic Shock; SP


Fever, recurrent- low grade 


Leukocytosis; persistent/fluctuating, SP


     -9/19 Bcx NTD


   -9/9 u/a no pyuria


   -9/8 Bcx NTD (Picc line)


   -9/6 Bcx NTD


            ucx Neg


             sp cx C. parapsilopsis


  -8/26 u/a no pyuria





Pneumonia.- COVID 19 neg x3


   Acute hypoxic resp failure on VM> NRB 15l 100%; hypoxic on ABG> now VDRF 

8/26- Fio2 80% >100% 8/28> 60% 9/1 >80% 9/2   >95% 9/10 >90% 9/14 >60% 9/15


R tension Pneumothroax sp CT 9/21


       -9/22 CXR: Interim complete reexpansion of right lung without evidence of

residual pneumothorax. Bilateral diffuse and extensive infiltrates. Markedly 

improved chest wall subcutaneous emphysema


       -9/21 CXR: Interim reexpansion of previously demonstrated right 

pneumothorax, status post large bore chest tube placement.


      -9/14 CXR: Improved aeration of both lungs.


       -9/5 CXR:Small bilateral pleural effusions with minor edema.  Stable 

edema with  mild worsening in the degree of pleural effusion on the right.


         -9/1 sp cx Neg


         8/31 CXR: Extensive bilateral interstitial and airspace disease appears

similar to the prior exam. Moderate to large bilateral pleural effusions appear 

unchanged.


   8/28 CXR: Increasing left upper lobe dense consolidation and likely 

increasing bilateral pleural fluid. Persistent diffuse dense consolidation 

elsewhere


            -8/26 sp cx normal resp lilliam


             -8/25 CXR: Increased atelectasis of the right lung, since prior 

exam of 3 days earlier. New or increased right pleural effusion. Increased left 

basilar consolidation and/or pleural fluid 


          -COVID Rapid PCR neg 8/23, 8/23, 8/26


          -8/22 spc x Group G strep


          -8/22 CXR:   Reduced lung volumes.  Patchy bilateral predominantly 

interstitial  pulmonary opacities.  Could be from edema and/or pneumonia.  There

is a broader differential.


 


        -legionella ag urine, blasto ab, Histo ab, HIV ab screen, JOY, ANCA neg





Persistent, high grade bacteremia-


     -8/22 Bcx 4/4 sets S. haemolyticus; 8/23 Bcx 3/4 S/ epi; 8/27 Bcx 1.4 S. 

warnerri; 8/29 Bcx Neg


     -2d echo: no vegetaions seen





          ua/ wbc 10-15, nit neg, leuk +1; ucx Neg





DAVID; 


 -supratherapeutic vanco levels





-Seizure disorder.


- Hypothyroidism.


- Down syndrome.





History of PEG tube placement.


[facility]





PLAN:





Monitor off abx, if more episode of fever, will start Empiric AB Rx 


          9/14 SP Meropenem #18, IV AMikacin #7


          9/4/20 SP Daptomycin #11


          9/2 SP MIcafungin #7, Linezolid #5


   8/28 SP Azithromycin #7/7


   8/26 SP Ceftriaxone #2


   8/25 SP IV Vancomycin #4, Zosyn #4


   8/22 SP Cefepime x1, Flagyl x1





- Monitor CBC, BMP.


.f/u cx


- COVID neg x3


- Monitor chest x-ray.


- Monitor the patient's clinical course and labs.  Based on those, we will do 

further recommendation.


-f/u Fungitell, asp ag, flow cytometry


-poor prognosis











Thank you, Dr. Cherry, for allowing me to participate in the care of


this patient.  I will follow the patient with you at this


hospitalization.








Discussed with RN





Subjective


Allergies:  


Coded Allergies:  


     No Known Allergies (Unverified , 1/8/19)





Afebrile


ON Vent 80% O2 


No leukocytosis





Objective





Last 24 Hour Vital Signs








  Date Time  Temp Pulse Resp B/P (MAP) Pulse Ox O2 Delivery O2 Flow Rate FiO2


 


9/24/20 10:50  46 26     80


 


9/24/20 10:00  44 26 173/79 (110) 96   


 


9/24/20 09:00  46 26 160/72 (101) 96   


 


9/24/20 08:00 99.8 51 25 171/79 (109) 95   


 


9/24/20 08:00      Mechanical Ventilator  





      Mechanical Ventilator  


 


9/24/20 08:00        80


 


9/24/20 07:38  43      


 


9/24/20 07:00  49 26 163/70 (101) 95   


 


9/24/20 07:00  44 26     80


 


9/24/20 06:00  54 26 133/75 (94) 95   


 


9/24/20 05:31    125/69    


 


9/24/20 05:00  60 26 169/75 (106) 97   


 


9/24/20 04:00      Mechanical Ventilator  





      Mechanical Ventilator  


 


9/24/20 04:00  55      


 


9/24/20 04:00        80


 


9/24/20 04:00 98.8 48 26 146/69 (94) 97   


 


9/24/20 03:00  49 25 130/75 (93) 96   


 


9/24/20 03:00  51 26     80


 


9/24/20 02:00  75 23 125/69 (87) 97   


 


9/24/20 01:00  63 46 163/84 (110) 95   


 


9/24/20 00:00      Mechanical Ventilator  





      Mechanical Ventilator  


 


9/24/20 00:00  66 39 168/78 (108) 97   


 


9/24/20 00:00        80


 


9/23/20 23:00  60 26 147/68 (94) 96   


 


9/23/20 22:49  60 26     80


 


9/23/20 22:00 99.0 51 26 174/75 (108) 95   


 


9/23/20 21:00 99.0       


 


9/23/20 21:00  58 33 167/117 (134) 97   


 


9/23/20 20:00      Mechanical Ventilator  





      Mechanical Ventilator  


 


9/23/20 20:00  61      


 


9/23/20 20:00        80


 


9/23/20 20:00 100.0 60 28 141/72 (95) 96   


 


9/23/20 19:05  63 26     80


 


9/23/20 19:00  62 26 150/71 (97) 97   


 


9/23/20 18:00 98.5 67 28 148/75 (99) 96   


 


9/23/20 17:00  63 34 137/63 (87) 91   


 


9/23/20 16:00        80


 


9/23/20 16:00 99.2 71 28 146/68 (94) 94   


 


9/23/20 16:00      Mechanical Ventilator  





      Mechanical Ventilator  


 


9/23/20 16:00  59 38 142/66 (91) 92   


 


9/23/20 15:12  52      


 


9/23/20 15:10  48 26     80


 


9/23/20 15:00  59 28 142/66 (91) 92   


 


9/23/20 14:00  57 28 151/77 (101) 93   


 


9/23/20 13:00  54 26 120/66 (84) 94   


 


9/23/20 12:04  72      


 


9/23/20 12:00      Mechanical Ventilator  





      Mechanical Ventilator  


 


9/23/20 12:00 99.3 62 28 138/67 (90) 95   


 


9/23/20 11:20        80


 


9/23/20 11:16  69 26     80








Height (Feet):  5


Height (Inches):  3.00


Weight (Pounds):  172


GEN: NAD on Vent


HEENT: NCAT, Intubated, 


Pulm: Equal chest rise and fall B/L, No accessory muscle use


ABD: Soft, ND


SKIN: Exposed skin with no rash, Normal in color





Laboratory Tests








Test


 9/23/20


17:27 9/23/20


20:05 9/23/20


23:55 9/24/20


04:00


 


POC Whole Blood Glucose


 180 MG/DL


()  H Pending  


 Pending  


 





 


White Blood Count


 


 


 


 10.4 K/UL


(4.8-10.8)


 


Red Blood Count


 


 


 


 3.11 M/UL


(4.20-5.40)  L


 


Hemoglobin


 


 


 


 10.0 G/DL


(12.0-16.0)  L


 


Hematocrit


 


 


 


 29.2 %


(37.0-47.0)  L


 


Mean Corpuscular Volume    94 FL (80-99)  


 


Mean Corpuscular Hemoglobin


 


 


 


 32.2 PG


(27.0-31.0)  H


 


Mean Corpuscular Hemoglobin


Concent 


 


 


 34.3 G/DL


(32.0-36.0)


 


Red Cell Distribution Width


 


 


 


 17.5 %


(11.6-14.8)  H


 


Platelet Count


 


 


 


 78 K/UL


(150-450)  L


 


Mean Platelet Volume


 


 


 


 9.7 FL


(6.5-10.1)


 


Neutrophils (%) (Auto)


 


 


 


 % (45.0-75.0)





 


Lymphocytes (%) (Auto)


 


 


 


 % (20.0-45.0)





 


Monocytes (%) (Auto)     % (1.0-10.0)  


 


Eosinophils (%) (Auto)     % (0.0-3.0)  


 


Basophils (%) (Auto)     % (0.0-2.0)  


 


Differential Total Cells


Counted 


 


 


 100  





 


Neutrophils % (Manual)    91 % (45-75)  H


 


Lymphocytes % (Manual)    7 % (20-45)  L


 


Monocytes % (Manual)    2 % (1-10)  


 


Eosinophils % (Manual)    0 % (0-3)  


 


Basophils % (Manual)    0 % (0-2)  


 


Band Neutrophils    0 % (0-8)  


 


Platelet Estimate    Decreased  L


 


Platelet Morphology    Normal  


 


Anisocytosis    1+  


 


Sodium Level


 


 


 


 149 MMOL/L


(136-145)  H


 


Potassium Level


 


 


 


 3.8 MMOL/L


(3.5-5.1)


 


Chloride Level


 


 


 


 113 MMOL/L


()  H


 


Carbon Dioxide Level


 


 


 


 29 MMOL/L


(21-32)


 


Anion Gap


 


 


 


 7 mmol/L


(5-15)


 


Blood Urea Nitrogen


 


 


 


 30 mg/dL


(7-18)  H


 


Creatinine


 


 


 


 0.7 MG/DL


(0.55-1.30)


 


Estimat Glomerular Filtration


Rate 


 


 


 > 60 mL/min


(>60)


 


Glucose Level


 


 


 


 193 MG/DL


()  H


 


Calcium Level


 


 


 


 7.6 MG/DL


(8.5-10.1)  L


 


Phosphorus Level


 


 


 


 2.3 MG/DL


(2.5-4.9)  L


 


Magnesium Level


 


 


 


 1.9 MG/DL


(1.8-2.4)


 


Total Bilirubin


 


 


 


 0.7 MG/DL


(0.2-1.0)


 


Aspartate Amino Transf


(AST/SGOT) 


 


 


 27 U/L (15-37)





 


Alanine Aminotransferase


(ALT/SGPT) 


 


 


 52 U/L (12-78)





 


Alkaline Phosphatase


 


 


 


 55 U/L


()


 


Total Protein


 


 


 


 4.6 G/DL


(6.4-8.2)  L


 


Albumin


 


 


 


 1.2 G/DL


(3.4-5.0)  L


 


Globulin    3.4 g/dL  


 


Albumin/Globulin Ratio


 


 


 


 0.4 (1.0-2.7)


L


 


Test


 9/24/20


05:29 9/24/20


08:39 


 





 


POC Whole Blood Glucose Pending     


 


Arterial Blood pH


 


 7.510


(7.350-7.450) 


 





 


Arterial Blood Partial


Pressure CO2 


 31.4 mmHg


(35.0-45.0)  L 


 





 


Arterial Blood Partial


Pressure O2 


 86.4 mmHg


(75.0-100.0) 


 





 


Arterial Blood HCO3


 


 24.5 mmol/L


(22.0-26.0) 


 





 


Arterial Blood Oxygen


Saturation 


 96.5 %


() 


 





 


Arterial Blood Base Excess  1.9 (-2-2)    


 


Cole Test  Positive    











Current Medications








 Medications


  (Trade)  Dose


 Ordered  Sig/Didi


 Route


 PRN Reason  Start Time


 Stop Time Status Last Admin


Dose Admin


 


 Acetaminophen


  (Tylenol)  650 mg  Q4H  PRN


 GT


 FEVER  8/26/20 11:45


 9/25/20 11:44  9/23/20 20:07





 


 Acetaminophen


  (Tylenol)  650 mg  Q4H  PRN


 GT


 For Pain  9/23/20 17:15


 10/23/20 17:14  9/23/20 17:23





 


 Chlorhexidine


 Gluconate


  (Beatrice-Hex 2%)  1 applic  DAILY@2000


 TOPIC


   8/31/20 20:00


 11/29/20 19:59  9/23/20 20:06





 


 Clotrimazole


  (Lotrimin)  1 applic  Q12HR


 TOPIC


   8/30/20 13:00


 11/28/20 12:59  9/24/20 08:41





 


 Dextrose


  (Dextrose 50%)  25 ml  Q30M  PRN


 IV


 Hypoglycemia  8/26/20 11:30


 11/20/20 11:29   





 


 Dextrose


  (Dextrose 50%)  50 ml  Q30M  PRN


 IV


 Hypoglycemia  8/26/20 11:30


 11/20/20 11:29   





 


 Dopamine HCl/


 Dextrose  250 ml @ 0


 mls/hr  Q24H


 IV


   9/4/20 11:15


 9/25/20 23:59  9/6/20 19:47





 


 Hydrocortisone


  (Solu-CORTEF)  100 mg  EVERY 8  HOURS


 IV


   8/30/20 14:00


 11/28/20 13:59  9/24/20 05:31





 


 Insulin Aspart


  (NovoLOG)    Q6HR


 SUBQ


   9/18/20 12:00


 12/17/20 11:59  9/24/20 05:31





 


 Insulin Detemir


  (Levemir)  10 units  Q12HR


 SUBQ


   9/18/20 10:00


 12/17/20 09:59  9/24/20 08:40





 


 Loperamide HCl


  (Imodium)  2 mg  Q6H  PRN


 NG


 Diarrhea  9/16/20 10:30


 10/16/20 10:29  9/23/20 11:41





 


 Midodrine


  (Pro-Amatine)  10 mg  Q8HR


 GT


   8/28/20 14:00


 11/26/20 13:59  9/24/20 05:31





 


 Norepinephrine


 Bitartrate 16 mg/


 Dextrose  500 ml @ 0


 mls/hr  Q24H


 IV


   8/31/20 07:45


 9/25/20 23:59  9/4/20 08:43





 


 Phenylephrine HCl


 50 mg/Dextrose  250 ml @ 0


 mls/hr  Q24H  PRN


 IV


 For hypotension  8/29/20 17:45


 9/25/20 23:59  8/31/20 01:49





 


 Potassium


 Phosphate 20 mm/


 Sodium Chloride  281.6667


 ml @ 


 46.944 m...  ONCE  ONCE


 IV


   9/24/20 09:30


 9/24/20 15:29  9/24/20 09:36





 


 Potassium Chloride


  (K-Dur)  40 meq  TWICE A  DAY


 GT


   9/20/20 12:15


 12/19/20 12:14  9/24/20 08:36




















Elfego Mcmahon MD            Sep 24, 2020 11:12

## 2020-09-24 NOTE — NUR
NURSE NOTES:

Sponge bath given. Blood drawn and sent to lab. Patient is afebrile at this time. Making 
good urine output. NAD at this time. Patient repositioned and oral care provided, range of 
motioned performed. Vitals are stable, blood pressures stable. Will continue to monitor.

## 2020-09-24 NOTE — INTERNAL MED PROGRESS NOTE
Subjective


Date of Service:  Sep 24, 2020


Physician Name


Sanya Schultz


Attending Physician


Chano Cherry MD





Current Medications








 Medications


  (Trade)  Dose


 Ordered  Sig/Didi


 Route


 PRN Reason  Start Time


 Stop Time Status Last Admin


Dose Admin


 


 Acetaminophen


  (Tylenol)  650 mg  Q4H  PRN


 GT


 FEVER  8/26/20 11:45


 9/25/20 11:44  9/23/20 20:07





 


 Acetaminophen


  (Tylenol)  650 mg  Q4H  PRN


 GT


 For Pain  9/23/20 17:15


 10/23/20 17:14  9/23/20 17:23





 


 Chlorhexidine


 Gluconate


  (Beatrice-Hex 2%)  1 applic  DAILY@2000


 TOPIC


   8/31/20 20:00


 11/29/20 19:59  9/23/20 20:06





 


 Clotrimazole


  (Lotrimin)  1 applic  Q12HR


 TOPIC


   8/30/20 13:00


 11/28/20 12:59  9/24/20 08:41





 


 Dextrose


  (Dextrose 50%)  25 ml  Q30M  PRN


 IV


 Hypoglycemia  8/26/20 11:30


 11/20/20 11:29   





 


 Dextrose


  (Dextrose 50%)  50 ml  Q30M  PRN


 IV


 Hypoglycemia  8/26/20 11:30


 11/20/20 11:29   





 


 Dopamine HCl/


 Dextrose  250 ml @ 0


 mls/hr  Q24H


 IV


   9/4/20 11:15


 9/25/20 23:59  9/6/20 19:47





 


 Hydrocortisone


  (Solu-CORTEF)  100 mg  EVERY 8  HOURS


 IV


   8/30/20 14:00


 11/28/20 13:59  9/24/20 13:46





 


 Insulin Aspart


  (NovoLOG)    Q6HR


 SUBQ


   9/18/20 12:00


 12/17/20 11:59  9/24/20 11:37





 


 Insulin Detemir


  (Levemir)  10 units  Q12HR


 SUBQ


   9/18/20 10:00


 12/17/20 09:59  9/24/20 08:40





 


 Loperamide HCl


  (Imodium)  2 mg  Q6H  PRN


 NG


 Diarrhea  9/16/20 10:30


 10/16/20 10:29  9/23/20 11:41





 


 Midodrine


  (Pro-Amatine)  10 mg  Q8HR


 GT


   8/28/20 14:00


 11/26/20 13:59  9/24/20 05:31





 


 Norepinephrine


 Bitartrate 16 mg/


 Dextrose  500 ml @ 0


 mls/hr  Q24H


 IV


   8/31/20 07:45


 9/25/20 23:59  9/4/20 08:43





 


 Phenylephrine HCl


 50 mg/Dextrose  250 ml @ 0


 mls/hr  Q24H  PRN


 IV


 For hypotension  8/29/20 17:45


 9/25/20 23:59  8/31/20 01:49





 


 Potassium Chloride


  (K-Dur)  40 meq  TWICE A  DAY


 GT


   9/20/20 12:15


 12/19/20 12:14  9/24/20 08:36











Allergies:  


Coded Allergies:  


     No Known Allergies (Unverified , 1/8/19)


ROS Limited/Unobtainable:  Yes


Subjective


60 YO F with Down's syndrome admitted with hypoxia.  Now sepsis and pneumonia.  

Cover for Int Med-DR Hung.  ICU.  Intubated and sedated





Objective





Last Vital Signs








  Date Time  Temp Pulse Resp B/P (MAP) Pulse Ox O2 Delivery O2 Flow Rate FiO2


 


9/24/20 16:00 99.5 55 26 153/78 (103) 96   


 


9/24/20 16:00      Mechanical Ventilator  





      Mechanical Ventilator  


 


9/24/20 16:00        80











Laboratory Tests








Test


 9/23/20


20:05 9/23/20


23:55 9/24/20


04:00 9/24/20


05:29


 


POC Whole Blood Glucose Pending   Pending    Pending  


 


White Blood Count


 


 


 10.4 K/UL


(4.8-10.8) 





 


Red Blood Count


 


 


 3.11 M/UL


(4.20-5.40)  L 





 


Hemoglobin


 


 


 10.0 G/DL


(12.0-16.0)  L 





 


Hematocrit


 


 


 29.2 %


(37.0-47.0)  L 





 


Mean Corpuscular Volume   94 FL (80-99)   


 


Mean Corpuscular Hemoglobin


 


 


 32.2 PG


(27.0-31.0)  H 





 


Mean Corpuscular Hemoglobin


Concent 


 


 34.3 G/DL


(32.0-36.0) 





 


Red Cell Distribution Width


 


 


 17.5 %


(11.6-14.8)  H 





 


Platelet Count


 


 


 78 K/UL


(150-450)  L 





 


Mean Platelet Volume


 


 


 9.7 FL


(6.5-10.1) 





 


Neutrophils (%) (Auto)


 


 


 % (45.0-75.0)


 





 


Lymphocytes (%) (Auto)


 


 


 % (20.0-45.0)


 





 


Monocytes (%) (Auto)    % (1.0-10.0)   


 


Eosinophils (%) (Auto)    % (0.0-3.0)   


 


Basophils (%) (Auto)    % (0.0-2.0)   


 


Differential Total Cells


Counted 


 


 100  


 





 


Neutrophils % (Manual)   91 % (45-75)  H 


 


Lymphocytes % (Manual)   7 % (20-45)  L 


 


Monocytes % (Manual)   2 % (1-10)   


 


Eosinophils % (Manual)   0 % (0-3)   


 


Basophils % (Manual)   0 % (0-2)   


 


Band Neutrophils   0 % (0-8)   


 


Platelet Estimate   Decreased  L 


 


Platelet Morphology   Normal   


 


Anisocytosis   1+   


 


Sodium Level


 


 


 149 MMOL/L


(136-145)  H 





 


Potassium Level


 


 


 3.8 MMOL/L


(3.5-5.1) 





 


Chloride Level


 


 


 113 MMOL/L


()  H 





 


Carbon Dioxide Level


 


 


 29 MMOL/L


(21-32) 





 


Anion Gap


 


 


 7 mmol/L


(5-15) 





 


Blood Urea Nitrogen


 


 


 30 mg/dL


(7-18)  H 





 


Creatinine


 


 


 0.7 MG/DL


(0.55-1.30) 





 


Estimat Glomerular Filtration


Rate 


 


 > 60 mL/min


(>60) 





 


Glucose Level


 


 


 193 MG/DL


()  H 





 


Calcium Level


 


 


 7.6 MG/DL


(8.5-10.1)  L 





 


Phosphorus Level


 


 


 2.3 MG/DL


(2.5-4.9)  L 





 


Magnesium Level


 


 


 1.9 MG/DL


(1.8-2.4) 





 


Total Bilirubin


 


 


 0.7 MG/DL


(0.2-1.0) 





 


Aspartate Amino Transf


(AST/SGOT) 


 


 27 U/L (15-37)


 





 


Alanine Aminotransferase


(ALT/SGPT) 


 


 52 U/L (12-78)


 





 


Alkaline Phosphatase


 


 


 55 U/L


() 





 


Total Protein


 


 


 4.6 G/DL


(6.4-8.2)  L 





 


Albumin


 


 


 1.2 G/DL


(3.4-5.0)  L 





 


Globulin   3.4 g/dL   


 


Albumin/Globulin Ratio


 


 


 0.4 (1.0-2.7)


L 





 


Test


 9/24/20


08:39 


 


 





 


Arterial Blood pH


 7.510


(7.350-7.450) 


 


 





 


Arterial Blood Partial


Pressure CO2 31.4 mmHg


(35.0-45.0)  L 


 


 





 


Arterial Blood Partial


Pressure O2 86.4 mmHg


(75.0-100.0) 


 


 





 


Arterial Blood HCO3


 24.5 mmol/L


(22.0-26.0) 


 


 





 


Arterial Blood Oxygen


Saturation 96.5 %


() 


 


 





 


Arterial Blood Base Excess 1.9 (-2-2)     


 


Cole Test Positive     

















Intake and Output  


 


 9/23/20 9/24/20





 19:00 07:00


 


Intake Total 950 ml 660 ml


 


Output Total 845 ml 960 ml


 


Balance 105 ml -300 ml


 


  


 


Free Water  60 ml


 


Tube Feeding 600 ml 600 ml


 


Blood Product 250 ml 


 


Other 100 ml 


 


Output Urine Total 720 ml 900 ml


 


Chest Tube Drainage Total 125 ml 60 ml


 


# Bowel Movements 2 








Objective


General Appearance:  WD/WN, no apparent distress, alert


EENT:  PERRL/EOMI, normal ENT inspection


Neck:  non-tender, normal alignment, supple, normal inspection


Cardiovascular:  normal peripheral pulses, normal rate, regular rhythm, no 

gallop/murmur, no JVD


Respiratory/Chest:  Mech vent; decreased breath sounds, crackles/rales, rhonchi 

- bilaterally, expiratory wheezing


Abdomen:  normal bowel sounds, non tender, soft, no organomegaly, no mass


Extremities:  normal range of motion


Neurologic:  CNs II-XII grossly normal


Skin:  normal pigmentation, warm/dry





Assessment/Plan


Problem List:  


(1) HCAP (healthcare-associated pneumonia)


Assessment & Plan:  Strep Group G.  Continue amikacin per ID=Dr Gomes.  

Pulmonary/Critical care=DR Cherry.  COVID NEG





(2) Sepsis


Assessment & Plan:  Staph haemolyticus.  Continue amikacin per ID=Dr Gomes





(3) Down's syndrome


(4) Dysphagia


Assessment & Plan:  S/P PEG





(5) Seizure disorder


Assessment & Plan:  Continue keppra and depakote





(6) Hypothyroidism


Assessment & Plan:  Continue synthroid





(7) Acute respiratory failure


Assessment & Plan:  Pulmonary = Dr Cherry; [facility] vent














Sanya Schultz MD               Sep 24, 2020 17:34

## 2020-09-24 NOTE — NUR
NURSE NOTES:

received report from jeff rn pt orally intubated -vent o2 sat 100% no acute resp distress 
noted  [id number]  chest tube-lilliam-vac 20 with leak serous dranage pt open eyes to touch 
reposition and suction

## 2020-09-24 NOTE — NUR
NURSE NOTES:

Called Dr. Knox and notified him regarding patients chest tube, I let him know that the 
patients chest tube air leak has resolved. Dr. Knox mentioned to keep patient on intermediate 
suctioning and to monitor for now and have a CXR done tomorrow morning

## 2020-09-24 NOTE — NUR
NURSE NOTES:

Repositioned and suctioned patient. Clear yellow fluid coming from Chest tube, Chest tube 
remains secured properly. No respiratory distress noted. vitals remains stable. 60ml of 
yellow fluid total output for the night.

## 2020-09-24 NOTE — NUR
NURSE NOTES:

Bed bath given to patient, turned and repositioned. Oral care given to patient, thick clear 
secretions removed via endotracheal suctioning.

## 2020-09-25 VITALS — DIASTOLIC BLOOD PRESSURE: 69 MMHG | SYSTOLIC BLOOD PRESSURE: 123 MMHG

## 2020-09-25 VITALS — DIASTOLIC BLOOD PRESSURE: 85 MMHG | SYSTOLIC BLOOD PRESSURE: 135 MMHG

## 2020-09-25 VITALS — DIASTOLIC BLOOD PRESSURE: 89 MMHG | SYSTOLIC BLOOD PRESSURE: 119 MMHG

## 2020-09-25 VITALS — SYSTOLIC BLOOD PRESSURE: 135 MMHG | DIASTOLIC BLOOD PRESSURE: 73 MMHG

## 2020-09-25 VITALS — SYSTOLIC BLOOD PRESSURE: 143 MMHG | DIASTOLIC BLOOD PRESSURE: 78 MMHG

## 2020-09-25 VITALS — DIASTOLIC BLOOD PRESSURE: 75 MMHG | SYSTOLIC BLOOD PRESSURE: 148 MMHG

## 2020-09-25 VITALS — SYSTOLIC BLOOD PRESSURE: 130 MMHG | DIASTOLIC BLOOD PRESSURE: 72 MMHG

## 2020-09-25 VITALS — SYSTOLIC BLOOD PRESSURE: 141 MMHG | DIASTOLIC BLOOD PRESSURE: 76 MMHG

## 2020-09-25 VITALS — SYSTOLIC BLOOD PRESSURE: 98 MMHG | DIASTOLIC BLOOD PRESSURE: 53 MMHG

## 2020-09-25 VITALS — SYSTOLIC BLOOD PRESSURE: 139 MMHG | DIASTOLIC BLOOD PRESSURE: 75 MMHG

## 2020-09-25 VITALS — DIASTOLIC BLOOD PRESSURE: 76 MMHG | SYSTOLIC BLOOD PRESSURE: 155 MMHG

## 2020-09-25 VITALS — DIASTOLIC BLOOD PRESSURE: 65 MMHG | SYSTOLIC BLOOD PRESSURE: 106 MMHG

## 2020-09-25 VITALS — DIASTOLIC BLOOD PRESSURE: 71 MMHG | SYSTOLIC BLOOD PRESSURE: 103 MMHG

## 2020-09-25 VITALS — DIASTOLIC BLOOD PRESSURE: 65 MMHG | SYSTOLIC BLOOD PRESSURE: 128 MMHG

## 2020-09-25 VITALS — DIASTOLIC BLOOD PRESSURE: 61 MMHG | SYSTOLIC BLOOD PRESSURE: 115 MMHG

## 2020-09-25 VITALS — DIASTOLIC BLOOD PRESSURE: 71 MMHG | SYSTOLIC BLOOD PRESSURE: 138 MMHG

## 2020-09-25 VITALS — SYSTOLIC BLOOD PRESSURE: 137 MMHG | DIASTOLIC BLOOD PRESSURE: 72 MMHG

## 2020-09-25 VITALS — SYSTOLIC BLOOD PRESSURE: 118 MMHG | DIASTOLIC BLOOD PRESSURE: 71 MMHG

## 2020-09-25 VITALS — DIASTOLIC BLOOD PRESSURE: 94 MMHG | SYSTOLIC BLOOD PRESSURE: 113 MMHG

## 2020-09-25 VITALS — SYSTOLIC BLOOD PRESSURE: 138 MMHG | DIASTOLIC BLOOD PRESSURE: 71 MMHG

## 2020-09-25 VITALS — DIASTOLIC BLOOD PRESSURE: 67 MMHG | SYSTOLIC BLOOD PRESSURE: 135 MMHG

## 2020-09-25 VITALS — DIASTOLIC BLOOD PRESSURE: 73 MMHG | SYSTOLIC BLOOD PRESSURE: 136 MMHG

## 2020-09-25 VITALS — SYSTOLIC BLOOD PRESSURE: 158 MMHG | DIASTOLIC BLOOD PRESSURE: 77 MMHG

## 2020-09-25 VITALS — SYSTOLIC BLOOD PRESSURE: 130 MMHG | DIASTOLIC BLOOD PRESSURE: 70 MMHG

## 2020-09-25 LAB
ADD MANUAL DIFF: YES
ALBUMIN SERPL-MCNC: 1.1 G/DL (ref 3.4–5)
ALBUMIN/GLOB SERPL: 0.3 {RATIO} (ref 1–2.7)
ALP SERPL-CCNC: 49 U/L (ref 46–116)
ALT SERPL-CCNC: 38 U/L (ref 12–78)
ANION GAP SERPL CALC-SCNC: 7 MMOL/L (ref 5–15)
AST SERPL-CCNC: 29 U/L (ref 15–37)
BILIRUB SERPL-MCNC: 0.7 MG/DL (ref 0.2–1)
BUN SERPL-MCNC: 33 MG/DL (ref 7–18)
CALCIUM SERPL-MCNC: 7.7 MG/DL (ref 8.5–10.1)
CHLORIDE SERPL-SCNC: 113 MMOL/L (ref 98–107)
CO2 SERPL-SCNC: 27 MMOL/L (ref 21–32)
CREAT SERPL-MCNC: 0.7 MG/DL (ref 0.55–1.3)
ERYTHROCYTE [DISTWIDTH] IN BLOOD BY AUTOMATED COUNT: 17.1 % (ref 11.6–14.8)
GLOBULIN SER-MCNC: 3.2 G/DL
HCT VFR BLD CALC: 30.4 % (ref 37–47)
HGB BLD-MCNC: 10.2 G/DL (ref 12–16)
MCV RBC AUTO: 94 FL (ref 80–99)
PHOSPHATE SERPL-MCNC: 2.4 MG/DL (ref 2.5–4.9)
PLATELET # BLD: 82 K/UL (ref 150–450)
POTASSIUM SERPL-SCNC: 3.7 MMOL/L (ref 3.5–5.1)
RBC # BLD AUTO: 3.23 M/UL (ref 4.2–5.4)
SODIUM SERPL-SCNC: 147 MMOL/L (ref 136–145)
WBC # BLD AUTO: 10.2 K/UL (ref 4.8–10.8)

## 2020-09-25 RX ADMIN — INSULIN ASPART SCH UNITS: 100 INJECTION, SOLUTION INTRAVENOUS; SUBCUTANEOUS at 06:00

## 2020-09-25 RX ADMIN — INSULIN DETEMIR SCH UNITS: 100 INJECTION, SOLUTION SUBCUTANEOUS at 21:06

## 2020-09-25 RX ADMIN — HYDROCORTISONE SODIUM SUCCINATE SCH MG: 100 INJECTION, POWDER, FOR SOLUTION INTRAMUSCULAR; INTRAVENOUS at 21:40

## 2020-09-25 RX ADMIN — INSULIN ASPART SCH UNITS: 100 INJECTION, SOLUTION INTRAVENOUS; SUBCUTANEOUS at 14:19

## 2020-09-25 RX ADMIN — INSULIN DETEMIR SCH UNITS: 100 INJECTION, SOLUTION SUBCUTANEOUS at 09:34

## 2020-09-25 RX ADMIN — CHLORHEXIDINE GLUCONATE SCH APPLIC: 213 SOLUTION TOPICAL at 20:57

## 2020-09-25 RX ADMIN — MIDODRINE HYDROCHLORIDE SCH MG: 10 TABLET ORAL at 14:18

## 2020-09-25 RX ADMIN — DOPAMINE HYDROCHLORIDE IN DEXTROSE SCH MLS/HR: 1.6 INJECTION, SOLUTION INTRAVENOUS at 11:15

## 2020-09-25 RX ADMIN — MIDODRINE HYDROCHLORIDE SCH MG: 10 TABLET ORAL at 05:52

## 2020-09-25 RX ADMIN — INSULIN ASPART SCH UNITS: 100 INJECTION, SOLUTION INTRAVENOUS; SUBCUTANEOUS at 17:48

## 2020-09-25 RX ADMIN — SODIUM CHLORIDE SCH MLS/HR: 900 INJECTION, SOLUTION INTRAVENOUS at 06:08

## 2020-09-25 RX ADMIN — HYDROCORTISONE SODIUM SUCCINATE SCH MG: 100 INJECTION, POWDER, FOR SOLUTION INTRAMUSCULAR; INTRAVENOUS at 05:52

## 2020-09-25 RX ADMIN — HYDROCORTISONE SODIUM SUCCINATE SCH MG: 100 INJECTION, POWDER, FOR SOLUTION INTRAMUSCULAR; INTRAVENOUS at 14:18

## 2020-09-25 RX ADMIN — MIDODRINE HYDROCHLORIDE SCH MG: 10 TABLET ORAL at 21:39

## 2020-09-25 RX ADMIN — INSULIN ASPART SCH UNITS: 100 INJECTION, SOLUTION INTRAVENOUS; SUBCUTANEOUS at 00:00

## 2020-09-25 NOTE — GENERAL PROGRESS NOTE
Subjective


ROS Limited/Unobtainable:  No


Allergies:  


Coded Allergies:  


     No Known Allergies (Unverified , 1/8/19)





Objective





Last 24 Hour Vital Signs








  Date Time  Temp Pulse Resp B/P (MAP) Pulse Ox O2 Delivery O2 Flow Rate FiO2


 


9/25/20 10:00  56 25 137/72 (93) 96   


 


9/25/20 09:00  52 34 155/76 (102) 97   


 


9/25/20 09:00  52 34 155/76 (102) 97   


 


9/25/20 08:00 98.9 54 26 135/67 (89) 94   


 


9/25/20 08:00        80


 


9/25/20 08:00      Mechanical Ventilator  





      Mechanical Ventilator  


 


9/25/20 07:48  56      


 


9/25/20 07:20  60 26     80


 


9/25/20 07:00  56 27 158/77 (104) 97   


 


9/25/20 06:08    136/60    


 


9/25/20 06:00  59 30 136/73 (94) 97   


 


9/25/20 05:00  58 26 130/72 (91) 96   


 


9/25/20 04:00 98.2 59 22 138/71 (93) 96   


 


9/25/20 04:00  57      


 


9/25/20 04:00        80


 


9/25/20 04:00      Mechanical Ventilator  





      Mechanical Ventilator  


 


9/25/20 03:59  65 26     80


 


9/25/20 03:00  62 27 138/71 (93) 95   


 


9/25/20 02:00  60 29 135/85 (102) 95   


 


9/25/20 01:00 98.8 63 27 139/75 (96) 90   


 


9/25/20 00:00  50 32 148/75 (99) 92   


 


9/25/20 00:00  58      


 


9/25/20 00:00        80


 


9/25/20 00:00      Mechanical Ventilator  





      Mechanical Ventilator  


 


9/24/20 23:12  58 26     80


 


9/24/20 22:00  65 30 125/65 (85) 95   


 


9/24/20 21:00 99.4 57 25 144/69 (94) 94   


 


9/24/20 20:00      Mechanical Ventilator  





      Mechanical Ventilator  


 


9/24/20 20:00        80


 


9/24/20 20:00  61 25 126/68 (87) 94   


 


9/24/20 20:00  57      


 


9/24/20 19:52  58 26     80


 


9/24/20 19:00  57 24 130/66 (87) 95   


 


9/24/20 18:00  62 24 135/70 (91) 96   


 


9/24/20 17:00  53 26 149/72 (97) 95   


 


9/24/20 16:00 99.5 55 26 153/78 (103) 96   


 


9/24/20 16:00      Mechanical Ventilator  





      Mechanical Ventilator  


 


9/24/20 16:00  56      


 


9/24/20 16:00        80


 


9/24/20 15:05  58 26     80


 


9/24/20 15:00  49 26 154/82 (106) 96   


 


9/24/20 14:00  56 28 144/71 (95) 96   


 


9/24/20 13:00  58 26 142/80 (100) 96   


 


9/24/20 12:00      Mechanical Ventilator  





      Mechanical Ventilator  


 


9/24/20 12:00        80


 


9/24/20 12:00 99.0 55 24 148/77 (100) 95   


 


9/24/20 11:23  99      

















Intake and Output  


 


 9/24/20 9/25/20





 19:00 07:00


 


Intake Total 800 ml 900 ml


 


Output Total 1900 ml 1090 ml


 


Balance -1100 ml -190 ml


 


  


 


Free Water 200 ml 300 ml


 


Tube Feeding 600 ml 600 ml


 


Output Urine Total 1900 ml 840 ml


 


Chest Tube Drainage Total  250 ml


 


# Bowel Movements  1








Laboratory Tests


9/24/20 21:04: POC Whole Blood Glucose [Pending]


9/25/20 01:09: POC Whole Blood Glucose [Pending]


9/25/20 04:40: 


Sodium Level 147H, Potassium Level 3.7, Chloride Level 113H, Carbon Dioxide 

Level 27, Anion Gap 7, Blood Urea Nitrogen 33H, Creatinine 0.7, Estimat 

Glomerular Filtration Rate > 60, Glucose Level 175H, Calcium Level 7.7L, Total 

Bilirubin 0.7, Aspartate Amino Transf (AST/SGOT) 29, Alanine Aminotransferase 

(ALT/SGPT) 38, Alkaline Phosphatase 49, Pro-B-Type Natriuretic Peptide 7364H, 

Total Protein 4.3L, Albumin 1.1L, Globulin 3.2, Albumin/Globulin Ratio 0.3L


9/25/20 07:35: 


White Blood Count 10.2, Red Blood Count 3.23L, Hemoglobin 10.2L, Hematocrit 

30.4L, Mean Corpuscular Volume 94, Mean Corpuscular Hemoglobin 31.6H, Mean 

Corpuscular Hemoglobin Concent 33.6, Red Cell Distribution Width 17.1H, Platelet

 Count 82L, Mean Platelet Volume 8.8, Neutrophils (%) (Auto) , Lymphocytes (%) 

(Auto) , Monocytes (%) (Auto) , Eosinophils (%) (Auto) , Basophils (%) (Auto) , 

Neutrophils % (Manual) [Pending], Lymphocytes % (Manual) [Pending], Platelet 

Estimate [Pending], Platelet Morphology [Pending], Phosphorus Level 2.4L, 

Magnesium Level 1.7L


9/25/20 09:32: POC Whole Blood Glucose 152H


Height (Feet):  5


Height (Inches):  3.00


Weight (Pounds):  172


General Appearance:  no apparent distress


EENT:  normal ENT inspection


Neck:  supple


Cardiovascular:  normal rate


Respiratory/Chest:  decreased breath sounds


Abdomen:  normal bowel sounds, non tender, soft


Extremities:  non-tender





Assessment/Plan


Status:  stable, not improved, unchanged


Assessment/Plan:


1. History of Down syndrome.


2. Dysphagia with G-tube.


3. Seizure disorder.


4. Hypothyroidism.


5. DAVID.


6. Pneumonia.


7. Sepsis.








fu H&H


prn blood transfusion to keep HGB above 7


ppi


GTF


hold GI procedures for now


stool ob + 1/2











Ted Nagy MD             Sep 25, 2020 11:05

## 2020-09-25 NOTE — NUR
NURSE NOTES:



Right chest tube remains inplace. Serous drainage noted in pleurovac. Will continue to 
monitor.

## 2020-09-25 NOTE — NUR
NURSE NOTES:

received report from star rn pt awake and alert orally intubated -vent o2 sat 100% no 
acute resp distress noted hr 58-60 sb-sr  cath lucille given to 2port picc line and aspirated 
and 30min

## 2020-09-25 NOTE — INTERNAL MED PROGRESS NOTE
Subjective


Physician Name


Tyson Hung


Attending Physician


Chano Cherry MD





Current Medications








 Medications


  (Trade)  Dose


 Ordered  Sig/Didi


 Route


 PRN Reason  Start Time


 Stop Time Status Last Admin


Dose Admin


 


 Acetaminophen


  (Tylenol)  650 mg  Q4H  PRN


 GT


 For Pain  9/23/20 17:15


 10/23/20 17:14  9/23/20 17:23





 


 Chlorhexidine


 Gluconate


  (Beatrice-Hex 2%)  1 applic  DAILY@2000


 TOPIC


   8/31/20 20:00


 11/29/20 19:59  9/24/20 20:56





 


 Clotrimazole


  (Lotrimin)  1 applic  Q12HR


 TOPIC


   8/30/20 13:00


 11/28/20 12:59  9/25/20 09:33





 


 Dextrose


  (Dextrose 50%)  25 ml  Q30M  PRN


 IV


 Hypoglycemia  8/26/20 11:30


 11/20/20 11:29   





 


 Dextrose


  (Dextrose 50%)  50 ml  Q30M  PRN


 IV


 Hypoglycemia  8/26/20 11:30


 11/20/20 11:29   





 


 Dopamine HCl/


 Dextrose  250 ml @ 0


 mls/hr  Q24H


 IV


   9/4/20 11:15


 9/25/20 23:59  9/6/20 19:47





 


 Hydrocortisone


  (Solu-CORTEF)  100 mg  EVERY 8  HOURS


 IV


   8/30/20 14:00


 11/28/20 13:59  9/25/20 14:18





 


 Insulin Aspart


  (NovoLOG)    Q6HR


 SUBQ


   9/18/20 12:00


 12/17/20 11:59  9/25/20 14:19





 


 Insulin Detemir


  (Levemir)  10 units  Q12HR


 SUBQ


   9/18/20 10:00


 12/17/20 09:59  9/25/20 09:34





 


 Loperamide HCl


  (Imodium)  2 mg  Q6H  PRN


 NG


 Diarrhea  9/16/20 10:30


 10/16/20 10:29  9/23/20 11:41





 


 Midodrine


  (Pro-Amatine)  10 mg  Q8HR


 GT


   8/28/20 14:00


 11/26/20 13:59  9/25/20 14:18





 


 Norepinephrine


 Bitartrate 16 mg/


 Dextrose  500 ml @ 0


 mls/hr  Q24H


 IV


   8/31/20 07:45


 9/25/20 23:59  9/4/20 08:43





 


 Phenylephrine HCl


 50 mg/Dextrose  250 ml @ 0


 mls/hr  Q24H  PRN


 IV


 For hypotension  8/29/20 17:45


 9/25/20 23:59  8/31/20 01:49





 


 Potassium Chloride


  (K-Dur)  40 meq  TWICE A  DAY


 GT


   9/20/20 12:15


 12/19/20 12:14  9/25/20 09:35











Allergies:  


Coded Allergies:  


     No Known Allergies (Unverified , 1/8/19)


Subjective


in ICU, remained intubated on the vent, open eyes, unable to follow command. 

WBC: 10.2.





Objective





Last Vital Signs








  Date Time  Temp Pulse Resp B/P (MAP) Pulse Ox O2 Delivery O2 Flow Rate FiO2


 


9/25/20 15:22  68 26     80


 


9/25/20 12:00 98.9   141/76 (97) 96   


 


9/25/20 12:00      Mechanical Ventilator  





      Mechanical Ventilator  











Laboratory Tests








Test


 9/24/20


21:04 9/25/20


01:09 9/25/20


04:40 9/25/20


07:35


 


POC Whole Blood Glucose Pending   Pending    


 


Sodium Level


 


 


 147 MMOL/L


(136-145)  H 





 


Potassium Level


 


 


 3.7 MMOL/L


(3.5-5.1) 





 


Chloride Level


 


 


 113 MMOL/L


()  H 





 


Carbon Dioxide Level


 


 


 27 MMOL/L


(21-32) 





 


Anion Gap


 


 


 7 mmol/L


(5-15) 





 


Blood Urea Nitrogen


 


 


 33 mg/dL


(7-18)  H 





 


Creatinine


 


 


 0.7 MG/DL


(0.55-1.30) 





 


Estimat Glomerular Filtration


Rate 


 


 > 60 mL/min


(>60) 





 


Glucose Level


 


 


 175 MG/DL


()  H 





 


Calcium Level


 


 


 7.7 MG/DL


(8.5-10.1)  L 





 


Total Bilirubin


 


 


 0.7 MG/DL


(0.2-1.0) 





 


Aspartate Amino Transf


(AST/SGOT) 


 


 29 U/L (15-37)


 





 


Alanine Aminotransferase


(ALT/SGPT) 


 


 38 U/L (12-78)


 





 


Alkaline Phosphatase


 


 


 49 U/L


() 





 


Pro-B-Type Natriuretic Peptide


 


 


 7364 pg/mL


(0-125)  H 





 


Total Protein


 


 


 4.3 G/DL


(6.4-8.2)  L 





 


Albumin


 


 


 1.1 G/DL


(3.4-5.0)  L 





 


Globulin   3.2 g/dL   


 


Albumin/Globulin Ratio


 


 


 0.3 (1.0-2.7)


L 





 


White Blood Count


 


 


 


 10.2 K/UL


(4.8-10.8)


 


Red Blood Count


 


 


 


 3.23 M/UL


(4.20-5.40)  L


 


Hemoglobin


 


 


 


 10.2 G/DL


(12.0-16.0)  L


 


Hematocrit


 


 


 


 30.4 %


(37.0-47.0)  L


 


Mean Corpuscular Volume    94 FL (80-99)  


 


Mean Corpuscular Hemoglobin


 


 


 


 31.6 PG


(27.0-31.0)  H


 


Mean Corpuscular Hemoglobin


Concent 


 


 


 33.6 G/DL


(32.0-36.0)


 


Red Cell Distribution Width


 


 


 


 17.1 %


(11.6-14.8)  H


 


Platelet Count


 


 


 


 82 K/UL


(150-450)  L


 


Mean Platelet Volume


 


 


 


 8.8 FL


(6.5-10.1)


 


Neutrophils (%) (Auto)


 


 


 


 % (45.0-75.0)





 


Lymphocytes (%) (Auto)


 


 


 


 % (20.0-45.0)





 


Monocytes (%) (Auto)     % (1.0-10.0)  


 


Eosinophils (%) (Auto)     % (0.0-3.0)  


 


Basophils (%) (Auto)     % (0.0-2.0)  


 


Differential Total Cells


Counted 


 


 


 100  





 


Neutrophils % (Manual)    92 % (45-75)  H


 


Lymphocytes % (Manual)    7 % (20-45)  L


 


Monocytes % (Manual)    1 % (1-10)  


 


Eosinophils % (Manual)    0 % (0-3)  


 


Basophils % (Manual)    0 % (0-2)  


 


Band Neutrophils    0 % (0-8)  


 


Platelet Estimate    Decreased  L


 


Platelet Morphology    Normal  


 


Hypochromasia    1+  


 


Anisocytosis    1+  


 


Spherocytes    1+  


 


Phosphorus Level


 


 


 


 2.4 MG/DL


(2.5-4.9)  L


 


Magnesium Level


 


 


 


 1.7 MG/DL


(1.8-2.4)  L


 


Test


 9/25/20


09:32 


 


 





 


POC Whole Blood Glucose


 152 MG/DL


()  H 


 


 




















Intake and Output  


 


 9/24/20 9/25/20





 19:00 07:00


 


Intake Total 800 ml 900 ml


 


Output Total 1900 ml 1090 ml


 


Balance -1100 ml -190 ml


 


  


 


Free Water 200 ml 300 ml


 


Tube Feeding 600 ml 600 ml


 


Output Urine Total 1900 ml 840 ml


 


Chest Tube Drainage Total  250 ml


 


# Bowel Movements  1








Objective


General: remain intubated, unable to follow command, open eyes with deep 

stimuli.


HEENT: NCAT, sclera anicteric, PERRL, ET Tube.


Neck: Supple, no significant jugular venous distention, 


Lungs: mechanical breath sounds decreased air at the bases,  no Wheeze, +coarse 

breath sound.


CHEST WALL: right side chest tube.


Heart: Regular rate and rhythm, normal S1/S2, no murmurs/gallops


Abdomen: soft, not tender, not distended. + bowel sounds, Morbid Obesity, PEG 

site intact.


: Valle cath


Extremities: No Cyanosis , clubbing,  Bilateral upper extremities edema. left 

upper extremity PICC line.


Neuro: limited secondary to patient status, unable to follow command, bilateral 

upper and lower extremities contracted.





Assessment/Plan


Assessment/Plan


Problem List:  


(1) HCAP (healthcare-associated pneumonia)


(2) Sepsis


(3) Down's syndrome


(4) Dysphagia S/P PEG


(5) Seizure disorder


(6) Hypothyroidism


(7) Acute Hypoxemic respiratory failure


(8) Persistent, high grade bacteremia-  r/o endocarditis


(9) DAVID


(10) right-sided pneumothorax status post chest tube placement.





Plan: 


Tolerated tube feeding @ 50 cc/hr


Follow-up with laboratory and cultures


decrease Hydrocortisone 50 mg IV every 8


Continue to monitor off abx 


Poor prognosis











Tyson Hung MD                 Sep 25, 2020 15:24

## 2020-09-25 NOTE — INFECTIOUS DISEASES PROG NOTE
Assessment/Plan








ASSESSMENT:


sp code blue 8/26


Septic Shock; SP


Fever, recurrent- low grade 


Leukocytosis; persistent/fluctuating, SP


     -9/19 Bcx NTD


   -9/9 u/a no pyuria


   -9/8 Bcx NTD (Picc line)


   -9/6 Bcx NTD


            ucx Neg


             sp cx C. parapsilopsis


  -8/26 u/a no pyuria





Pneumonia.- COVID 19 neg x3


   Acute hypoxic resp failure on VM> NRB 15l 100%; hypoxic on ABG> now VDRF 

8/26- Fio2 80% >100% 8/28> 60% 9/1 >80% 9/2   >95% 9/10 >90% 9/14 >60% 9/15


R tension Pneumothroax sp CT 9/21


       -9/22 CXR: Interim complete reexpansion of right lung without evidence of

residual pneumothorax. Bilateral diffuse and extensive infiltrates. Markedly 

improved chest wall subcutaneous emphysema


       -9/21 CXR: Interim reexpansion of previously demonstrated right 

pneumothorax, status post large bore chest tube placement.


      -9/14 CXR: Improved aeration of both lungs.


       -9/5 CXR:Small bilateral pleural effusions with minor edema.  Stable 

edema with  mild worsening in the degree of pleural effusion on the right.


         -9/1 sp cx Neg


         8/31 CXR: Extensive bilateral interstitial and airspace disease appears

similar to the prior exam. Moderate to large bilateral pleural effusions appear 

unchanged.


   8/28 CXR: Increasing left upper lobe dense consolidation and likely 

increasing bilateral pleural fluid. Persistent diffuse dense consolidation 

elsewhere


            -8/26 sp cx normal resp lilliam


             -8/25 CXR: Increased atelectasis of the right lung, since prior 

exam of 3 days earlier. New or increased right pleural effusion. Increased left 

basilar consolidation and/or pleural fluid 


          -COVID Rapid PCR neg 8/23, 8/23, 8/26


          -8/22 spc x Group G strep


          -8/22 CXR:   Reduced lung volumes.  Patchy bilateral predominantly 

interstitial  pulmonary opacities.  Could be from edema and/or pneumonia.  There

is a broader differential.


 


        -legionella ag urine, blasto ab, Histo ab, HIV ab screen, JOY, ANCA neg





Persistent, high grade bacteremia-


     -8/22 Bcx 4/4 sets S. haemolyticus; 8/23 Bcx 3/4 S/ epi; 8/27 Bcx 1.4 S. 

warnerri; 8/29 Bcx Neg


     -2d echo: no vegetaions seen





          ua/ wbc 10-15, nit neg, leuk +1; ucx Neg





DAVID; 


 -supratherapeutic vanco levels





-Seizure disorder.


- Hypothyroidism.


- Down syndrome.





History of PEG tube placement.


[facility]





PLAN:





Monitor off abx, if more episode of fever, will start Empiric AB Rx 





          9/14 SP Meropenem #18, IV AMikacin #7


          9/4/20 SP Daptomycin #11


          9/2 SP MIcafungin #7, Linezolid #5


   8/28 SP Azithromycin #7/7


   8/26 SP Ceftriaxone #2


   8/25 SP IV Vancomycin #4, Zosyn #4


   8/22 SP Cefepime x1, Flagyl x1





- Monitor CBC, BMP.


.f/u cx


- COVID neg x3


- Monitor chest x-ray.


- Monitor the patient's clinical course and labs.  Based on those, we will do 

further recommendation.


-f/u Fungitell, asp ag, flow cytometry


-poor prognosis











Thank you, Dr. Cherry, for allowing me to participate in the care of


this patient.  I will follow the patient with you at this


hospitalization.








Discussed with RN





Subjective


Allergies:  


Coded Allergies:  


     No Known Allergies (Unverified , 1/8/19)





Afebrile


ON Vent 80% O2 


No leukocytosis


DEVI





Objective





Last 24 Hour Vital Signs








  Date Time  Temp Pulse Resp B/P (MAP) Pulse Ox O2 Delivery O2 Flow Rate FiO2


 


9/25/20 11:00  50 26 143/78 (99) 96   


 


9/25/20 10:00  56 25 137/72 (93) 96   


 


9/25/20 09:00  52 34 155/76 (102) 97   


 


9/25/20 09:00  52 34 155/76 (102) 97   


 


9/25/20 08:00 98.9 54 26 135/67 (89) 94   


 


9/25/20 08:00        80


 


9/25/20 08:00      Mechanical Ventilator  





      Mechanical Ventilator  


 


9/25/20 07:48  56      


 


9/25/20 07:20  60 26     80


 


9/25/20 07:00  56 27 158/77 (104) 97   


 


9/25/20 06:08    136/60    


 


9/25/20 06:00  59 30 136/73 (94) 97   


 


9/25/20 05:00  58 26 130/72 (91) 96   


 


9/25/20 04:00 98.2 59 22 138/71 (93) 96   


 


9/25/20 04:00  57      


 


9/25/20 04:00        80


 


9/25/20 04:00      Mechanical Ventilator  





      Mechanical Ventilator  


 


9/25/20 03:59  65 26     80


 


9/25/20 03:00  62 27 138/71 (93) 95   


 


9/25/20 02:00  60 29 135/85 (102) 95   


 


9/25/20 01:00 98.8 63 27 139/75 (96) 90   


 


9/25/20 00:00  50 32 148/75 (99) 92   


 


9/25/20 00:00  58      


 


9/25/20 00:00        80


 


9/25/20 00:00      Mechanical Ventilator  





      Mechanical Ventilator  


 


9/24/20 23:12  58 26     80


 


9/24/20 22:00  65 30 125/65 (85) 95   


 


9/24/20 21:00 99.4 57 25 144/69 (94) 94   


 


9/24/20 20:00      Mechanical Ventilator  





      Mechanical Ventilator  


 


9/24/20 20:00        80


 


9/24/20 20:00  61 25 126/68 (87) 94   


 


9/24/20 20:00  57      


 


9/24/20 19:52  58 26     80


 


9/24/20 19:00  57 24 130/66 (87) 95   


 


9/24/20 18:00  62 24 135/70 (91) 96   


 


9/24/20 17:00  53 26 149/72 (97) 95   


 


9/24/20 16:00 99.5 55 26 153/78 (103) 96   


 


9/24/20 16:00      Mechanical Ventilator  





      Mechanical Ventilator  


 


9/24/20 16:00  56      


 


9/24/20 16:00        80


 


9/24/20 15:05  58 26     80


 


9/24/20 15:00  49 26 154/82 (106) 96   


 


9/24/20 14:00  56 28 144/71 (95) 96   


 


9/24/20 13:00  58 26 142/80 (100) 96   


 


9/24/20 12:00      Mechanical Ventilator  





      Mechanical Ventilator  


 


9/24/20 12:00        80


 


9/24/20 12:00 99.0 55 24 148/77 (100) 95   








Height (Feet):  5


Height (Inches):  3.00


Weight (Pounds):  172


GEN: NAD on Vent 80% O2


HEENT: NCAT, Intubated, 


Pulm: Equal chest rise and fall B/L, No accessory muscle use


ABD: Soft, ND


SKIN: Exposed skin with no rash, Normal in color





Laboratory Tests








Test


 9/24/20


21:04 9/25/20


01:09 9/25/20


04:40 9/25/20


07:35


 


POC Whole Blood Glucose Pending   Pending    


 


Sodium Level


 


 


 147 MMOL/L


(136-145)  H 





 


Potassium Level


 


 


 3.7 MMOL/L


(3.5-5.1) 





 


Chloride Level


 


 


 113 MMOL/L


()  H 





 


Carbon Dioxide Level


 


 


 27 MMOL/L


(21-32) 





 


Anion Gap


 


 


 7 mmol/L


(5-15) 





 


Blood Urea Nitrogen


 


 


 33 mg/dL


(7-18)  H 





 


Creatinine


 


 


 0.7 MG/DL


(0.55-1.30) 





 


Estimat Glomerular Filtration


Rate 


 


 > 60 mL/min


(>60) 





 


Glucose Level


 


 


 175 MG/DL


()  H 





 


Calcium Level


 


 


 7.7 MG/DL


(8.5-10.1)  L 





 


Total Bilirubin


 


 


 0.7 MG/DL


(0.2-1.0) 





 


Aspartate Amino Transf


(AST/SGOT) 


 


 29 U/L (15-37)


 





 


Alanine Aminotransferase


(ALT/SGPT) 


 


 38 U/L (12-78)


 





 


Alkaline Phosphatase


 


 


 49 U/L


() 





 


Pro-B-Type Natriuretic Peptide


 


 


 7364 pg/mL


(0-125)  H 





 


Total Protein


 


 


 4.3 G/DL


(6.4-8.2)  L 





 


Albumin


 


 


 1.1 G/DL


(3.4-5.0)  L 





 


Globulin   3.2 g/dL   


 


Albumin/Globulin Ratio


 


 


 0.3 (1.0-2.7)


L 





 


White Blood Count


 


 


 


 10.2 K/UL


(4.8-10.8)


 


Red Blood Count


 


 


 


 3.23 M/UL


(4.20-5.40)  L


 


Hemoglobin


 


 


 


 10.2 G/DL


(12.0-16.0)  L


 


Hematocrit


 


 


 


 30.4 %


(37.0-47.0)  L


 


Mean Corpuscular Volume    94 FL (80-99)  


 


Mean Corpuscular Hemoglobin


 


 


 


 31.6 PG


(27.0-31.0)  H


 


Mean Corpuscular Hemoglobin


Concent 


 


 


 33.6 G/DL


(32.0-36.0)


 


Red Cell Distribution Width


 


 


 


 17.1 %


(11.6-14.8)  H


 


Platelet Count


 


 


 


 82 K/UL


(150-450)  L


 


Mean Platelet Volume


 


 


 


 8.8 FL


(6.5-10.1)


 


Neutrophils (%) (Auto)


 


 


 


 % (45.0-75.0)





 


Lymphocytes (%) (Auto)


 


 


 


 % (20.0-45.0)





 


Monocytes (%) (Auto)     % (1.0-10.0)  


 


Eosinophils (%) (Auto)     % (0.0-3.0)  


 


Basophils (%) (Auto)     % (0.0-2.0)  


 


Neutrophils % (Manual)    Pending  


 


Lymphocytes % (Manual)    Pending  


 


Platelet Estimate    Pending  


 


Platelet Morphology    Pending  


 


Phosphorus Level


 


 


 


 2.4 MG/DL


(2.5-4.9)  L


 


Magnesium Level


 


 


 


 1.7 MG/DL


(1.8-2.4)  L


 


Test


 9/25/20


09:32 


 


 





 


POC Whole Blood Glucose


 152 MG/DL


()  H 


 


 














Current Medications








 Medications


  (Trade)  Dose


 Ordered  Sig/Didi


 Route


 PRN Reason  Start Time


 Stop Time Status Last Admin


Dose Admin


 


 Acetaminophen


  (Tylenol)  650 mg  Q4H  PRN


 GT


 FEVER  8/26/20 11:45


 9/25/20 11:44  9/23/20 20:07





 


 Acetaminophen


  (Tylenol)  650 mg  Q4H  PRN


 GT


 For Pain  9/23/20 17:15


 10/23/20 17:14  9/23/20 17:23





 


 Chlorhexidine


 Gluconate


  (Beatrice-Hex 2%)  1 applic  DAILY@2000


 TOPIC


   8/31/20 20:00


 11/29/20 19:59  9/24/20 20:56





 


 Clotrimazole


  (Lotrimin)  1 applic  Q12HR


 TOPIC


   8/30/20 13:00


 11/28/20 12:59  9/25/20 09:33





 


 Dextrose


  (Dextrose 50%)  25 ml  Q30M  PRN


 IV


 Hypoglycemia  8/26/20 11:30


 11/20/20 11:29   





 


 Dextrose


  (Dextrose 50%)  50 ml  Q30M  PRN


 IV


 Hypoglycemia  8/26/20 11:30


 11/20/20 11:29   





 


 Dopamine HCl/


 Dextrose  250 ml @ 0


 mls/hr  Q24H


 IV


   9/4/20 11:15


 9/25/20 23:59  9/6/20 19:47





 


 Hydrocortisone


  (Solu-CORTEF)  100 mg  EVERY 8  HOURS


 IV


   8/30/20 14:00


 11/28/20 13:59  9/25/20 05:52





 


 Insulin Aspart


  (NovoLOG)    Q6HR


 SUBQ


   9/18/20 12:00


 12/17/20 11:59  9/25/20 00:00





 


 Insulin Detemir


  (Levemir)  10 units  Q12HR


 SUBQ


   9/18/20 10:00


 12/17/20 09:59  9/25/20 09:34





 


 Loperamide HCl


  (Imodium)  2 mg  Q6H  PRN


 NG


 Diarrhea  9/16/20 10:30


 10/16/20 10:29  9/23/20 11:41





 


 Midodrine


  (Pro-Amatine)  10 mg  Q8HR


 GT


   8/28/20 14:00


 11/26/20 13:59  9/25/20 05:52





 


 Norepinephrine


 Bitartrate 16 mg/


 Dextrose  500 ml @ 0


 mls/hr  Q24H


 IV


   8/31/20 07:45


 9/25/20 23:59  9/4/20 08:43





 


 Phenylephrine HCl


 50 mg/Dextrose  250 ml @ 0


 mls/hr  Q24H  PRN


 IV


 For hypotension  8/29/20 17:45


 9/25/20 23:59  8/31/20 01:49





 


 Potassium Chloride


  (K-Dur)  40 meq  TWICE A  DAY


 GT


   9/20/20 12:15


 12/19/20 12:14  9/25/20 09:35




















Elfego Mcmahon MD            Sep 25, 2020 11:24

## 2020-09-25 NOTE — NUR
RD ASSESSMENT & RECOMMENDATIONS

SEE CARE ACTIVITY FOR COMPLETE ASSESSMENT



DAILY ESTIMATED NEEDS:

Needs based on CRITICAL CARE, 50kg  

22-28  kcals/kg 

9292-1353  total kcals

1.25-2  g protein/kg

  g total protein 

25-30  mL/kg

4966-8545  total fluid mLs



NUTRITION DIAGNOSIS:

Swallowing difficulty r/t dysphagia as evdienced by pt w/ Downs Syndrome,

PEG dep for all nutritional needs, now intubated, now off pressor support,

ICU status.



 



CURRENT TF:Vital AF 1.2 @ 50ml/hr 24 hrs 



 



ENTERAL NUTRITION RECOMMENDATIONS:

VITAL AF 1.2 @ 45ml/hr x 24 hrs  to provide 1080ml, 1296kcal, 81g prot, 876ml free water 



- Maintain Vital AF for carb control, critical care, elemental feeds for diarrhea

- For continuous TF of x 24 hrs (off Synthroid at this time),  rec goal rate of 45ml/hr x 24 
hrs

      -> will meet 100% est kcal/prot needs

-----

If Synthroid is re-added to medlist, recommend:

 VITAL AF 1.2 @ 50ml/hr x22 hrs to provide 1100ml, 1320 kcal, 83g pro, 892ml free H2O (hold 
TF 1 hr before and after Synthroid)



 

ADDITIONAL RECOMMENDATIONS:

1) Ht of 61 inches per SNF; recalibrate bed scale for accurate CBW  

2) Add NISS: BGs now in the 200's, on Solucortef-> NOW ON NISS + LEVEMIR 

3) Monitor hemodynamic stability: pressors held at this time 

4) Wound healing: Continue Vit C 250mg QD + Marcio BID 

5) Monitor lytes, replete as needed (low phos) 

6) Add probiotics for diarrhea 

.

## 2020-09-25 NOTE — DIAGNOSTIC IMAGING REPORT
Indication: Dyspnea

 

Technique: One view of the chest

 

Comparison: 9/24/2020

 

Findings: Stable satisfactory positions of endotracheal tube, PICC, right chest tube.

No pneumothorax. Bilateral extensive pulmonary infiltrates are unchanged

 

Impression:  Unchanged, over one day, findings as above.

## 2020-09-25 NOTE — NUR
NURSE HAND-OFF REPORT: 



Latest Vital Signs: Temperature 98.7 , Pulse 67 , B/P 118 /71 , Respiratory Rate 27 , O2 SAT 
97 , Mechanical Ventilator, O2 Flow Rate 15.0 .  



EKG Rhythm: Sinus Rhythm

Rhythm change?: N 



Latest Momin Fall Score: 70  

Fall Risk: High Risk 

Safety Measures: Call light Within Reach, Bed Alarm Zone 1, Side Rails Side Rails x3, Bed 
position Low and Locked.

Yellow Socks

Door Sign

Patient Fall Education

On continuous right chest tube to pleurovac.

Report given to Roger CHANEL RN.

## 2020-09-25 NOTE — NUR
NURSE HAND-OFF REPORT: 



Latest Vital Signs: Temperature 98.2 , Pulse 59 , B/P 136 /60 , Respiratory Rate 30 , O2 SAT 
97 , Mechanical Ventilator, O2 Flow Rate 15.0 .  

Vital Sign Comment: 



EKG Rhythm: Sinus Rhythm

Rhythm change?: N 

MD Notified?: Y Jean-Pierre Carver MD Response: No New Orders Received



Latest Momin Fall Score: 70  

Fall Risk: High Risk 

Safety Measures: Call light Within Reach, Bed Alarm Zone 1, Side Rails Side Rails x3, Bed 
position Low and Locked.

Fall Precautions: 

Yellow Socks

Door Sign

Patient Fall Education



Report given to tiffany mccurdy using sbar .

## 2020-09-25 NOTE — PULMONOLGY CRITICAL CARE NOTE
Critical Care - Asmt/Plan


Problems:  


(1) Acute respiratory failure


(2) Pneumothorax


(3) Bacteremia


(4) Pneumonia


(5) Sepsis


(6) HCAP (healthcare-associated pneumonia)


(7) Seizure disorder


(8) Down's syndrome


(9) Trisomy 21, Down syndrome


Respiratory:  monitor respiratory rate, adjust FIO2, CXR


Cardiac:  continue pressors, continue to monitor HR/BP


Renal:  F/U I&O, keep IV fluid, check electrolytes


Infectious Disease:  check cultures


Gastrointestinal:  continue feedings/current rate


Endocrine:  monitor blood sugar, check HgA1C, continue sliding scale insulin


Hematologic:  monitor H/H, transfuse if hgb<8.5


Neurologic:  keep patient comfortable


Affect:  PRN ativan


Prophylaxis:  Heparin


Time Spent (Minutes):  40


Notes Reviewed:  cardio, renal


Discussed with:  nurses, consultants, 





Critical Care - Objective





Last 24 Hour Vital Signs








  Date Time  Temp Pulse Resp B/P (MAP) Pulse Ox O2 Delivery O2 Flow Rate FiO2


 


9/25/20 09:00  52 34 155/76 (102) 97   


 


9/25/20 09:00  52 34 155/76 (102) 97   


 


9/25/20 08:00 98.9 54 26 135/67 (89) 94   


 


9/25/20 08:00        80


 


9/25/20 08:00      Mechanical Ventilator  





      Mechanical Ventilator  


 


9/25/20 07:48  56      


 


9/25/20 07:20  60 26     80


 


9/25/20 07:00  56 27 158/77 (104) 97   


 


9/25/20 06:08    136/60    


 


9/25/20 06:00  59 30 136/73 (94) 97   


 


9/25/20 05:00  58 26 130/72 (91) 96   


 


9/25/20 04:00 98.2 59 22 138/71 (93) 96   


 


9/25/20 04:00  57      


 


9/25/20 04:00        80


 


9/25/20 04:00      Mechanical Ventilator  





      Mechanical Ventilator  


 


9/25/20 03:59  65 26     80


 


9/25/20 03:00  62 27 138/71 (93) 95   


 


9/25/20 02:00  60 29 135/85 (102) 95   


 


9/25/20 01:00 98.8 63 27 139/75 (96) 90   


 


9/25/20 00:00  50 32 148/75 (99) 92   


 


9/25/20 00:00  58      


 


9/25/20 00:00        80


 


9/25/20 00:00      Mechanical Ventilator  





      Mechanical Ventilator  


 


9/24/20 23:12  58 26     80


 


9/24/20 22:00  65 30 125/65 (85) 95   


 


9/24/20 21:00 99.4 57 25 144/69 (94) 94   


 


9/24/20 20:00      Mechanical Ventilator  





      Mechanical Ventilator  


 


9/24/20 20:00        80


 


9/24/20 20:00  61 25 126/68 (87) 94   


 


9/24/20 20:00  57      


 


9/24/20 19:52  58 26     80


 


9/24/20 19:00  57 24 130/66 (87) 95   


 


9/24/20 18:00  62 24 135/70 (91) 96   


 


9/24/20 17:00  53 26 149/72 (97) 95   


 


9/24/20 16:00 99.5 55 26 153/78 (103) 96   


 


9/24/20 16:00      Mechanical Ventilator  





      Mechanical Ventilator  


 


9/24/20 16:00  56      


 


9/24/20 16:00        80


 


9/24/20 15:05  58 26     80


 


9/24/20 15:00  49 26 154/82 (106) 96   


 


9/24/20 14:00  56 28 144/71 (95) 96   


 


9/24/20 13:00  58 26 142/80 (100) 96   


 


9/24/20 12:00      Mechanical Ventilator  





      Mechanical Ventilator  


 


9/24/20 12:00        80


 


9/24/20 12:00 99.0 55 24 148/77 (100) 95   


 


9/24/20 11:23  99      


 


9/24/20 11:00  45 26 157/70 (99) 96   


 


9/24/20 10:50  46 26     80








Status:  sedated


Condition:  critical


HEENT:  atraumatic, normocephalic


Neck:  full ROM


Heart:  HR/BP stable


Abdomen:  soft, feeding tube


Extremities:  edema


Accucheck:  152





Critical Care - Subjective


Intubation Day:  34


FI02:  80


Vent Support Breath Rate:  26


Vent Support Mode:  AC


Vent Tidal Volume:  500


Sputum Amount:  Moderate


PEEP:  0.0


PIP:  33


Tube Feeding Amount:  50


I&O:











Intake and Output  


 


 9/24/20 9/25/20





 19:00 07:00


 


Intake Total 800 ml 900 ml


 


Output Total 1900 ml 1090 ml


 


Balance -1100 ml -190 ml


 


  


 


Free Water 200 ml 300 ml


 


Tube Feeding 600 ml 600 ml


 


Output Urine Total 1900 ml 840 ml


 


Chest Tube Drainage Total  250 ml


 


# Bowel Movements  1








ET-Tube:  7.5


ET Position:  22


Labs:





Laboratory Tests








Test


 9/24/20


21:04 9/25/20


01:09 9/25/20


04:40 9/25/20


07:35


 


POC Whole Blood Glucose Pending   Pending    


 


Sodium Level


 


 


 147 MMOL/L


(136-145)  H 





 


Potassium Level


 


 


 3.7 MMOL/L


(3.5-5.1) 





 


Chloride Level


 


 


 113 MMOL/L


()  H 





 


Carbon Dioxide Level


 


 


 27 MMOL/L


(21-32) 





 


Anion Gap


 


 


 7 mmol/L


(5-15) 





 


Blood Urea Nitrogen


 


 


 33 mg/dL


(7-18)  H 





 


Creatinine


 


 


 0.7 MG/DL


(0.55-1.30) 





 


Estimat Glomerular Filtration


Rate 


 


 > 60 mL/min


(>60) 





 


Glucose Level


 


 


 175 MG/DL


()  H 





 


Calcium Level


 


 


 7.7 MG/DL


(8.5-10.1)  L 





 


Total Bilirubin


 


 


 0.7 MG/DL


(0.2-1.0) 





 


Aspartate Amino Transf


(AST/SGOT) 


 


 29 U/L (15-37)


 





 


Alanine Aminotransferase


(ALT/SGPT) 


 


 38 U/L (12-78)


 





 


Alkaline Phosphatase


 


 


 49 U/L


() 





 


Pro-B-Type Natriuretic Peptide


 


 


 7364 pg/mL


(0-125)  H 





 


Total Protein


 


 


 4.3 G/DL


(6.4-8.2)  L 





 


Albumin


 


 


 1.1 G/DL


(3.4-5.0)  L 





 


Globulin   3.2 g/dL   


 


Albumin/Globulin Ratio


 


 


 0.3 (1.0-2.7)


L 





 


White Blood Count


 


 


 


 10.2 K/UL


(4.8-10.8)


 


Red Blood Count


 


 


 


 3.23 M/UL


(4.20-5.40)  L


 


Hemoglobin


 


 


 


 10.2 G/DL


(12.0-16.0)  L


 


Hematocrit


 


 


 


 30.4 %


(37.0-47.0)  L


 


Mean Corpuscular Volume    94 FL (80-99)  


 


Mean Corpuscular Hemoglobin


 


 


 


 31.6 PG


(27.0-31.0)  H


 


Mean Corpuscular Hemoglobin


Concent 


 


 


 33.6 G/DL


(32.0-36.0)


 


Red Cell Distribution Width


 


 


 


 17.1 %


(11.6-14.8)  H


 


Platelet Count


 


 


 


 82 K/UL


(150-450)  L


 


Mean Platelet Volume


 


 


 


 8.8 FL


(6.5-10.1)


 


Neutrophils (%) (Auto)


 


 


 


 % (45.0-75.0)





 


Lymphocytes (%) (Auto)


 


 


 


 % (20.0-45.0)





 


Monocytes (%) (Auto)     % (1.0-10.0)  


 


Eosinophils (%) (Auto)     % (0.0-3.0)  


 


Basophils (%) (Auto)     % (0.0-2.0)  


 


Neutrophils % (Manual)    Pending  


 


Lymphocytes % (Manual)    Pending  


 


Platelet Estimate    Pending  


 


Platelet Morphology    Pending  


 


Phosphorus Level


 


 


 


 2.4 MG/DL


(2.5-4.9)  L


 


Magnesium Level


 


 


 


 1.7 MG/DL


(1.8-2.4)  L


 


Test


 9/25/20


09:32 


 


 





 


POC Whole Blood Glucose


 152 MG/DL


()  H 


 


 




















Chano Cherry MD              Sep 25, 2020 10:28

## 2020-09-25 NOTE — NUR
NURSE NOTES:

complete bed bath oral care and chest tube dressing change done reposition and suction

## 2020-09-25 NOTE — NEPHROLOGY PROGRESS NOTE
Assessment/Plan


Problem List:  


(1) DAVID (acute kidney injury)


(2) Respiratory failure requiring intubation


(3) Down's syndrome


(4) Seizure disorder


(5) Hypothyroidism


Assessment





Acute renal failure, likely due to hypotension


Acute respiratory distress, hypoxia


Seizure disorder


Hypothyroidism


Down syndrome


Full code











Fluid challenge with IV fluids and albumin


Midodrine for BP above 100 systolic


Check TSH level


Check


Correct level


Monitor renal parameters


Urine studies


Per orders


Plan


September 25: Lab reviewed.  Renal parameters stable.  Full code.  Intubated.  

Electrolyte abnormalities addressed and replacement done.


September 24: Lab reviewed.  Renal parameters stable.  Full code.  Intubated.  

Has right side chest tube.  Continue per consultants.


September 23: Lab reviewed.  Renal parameters stable.  Full code.  Has right 

chest tube.  Planning process for tracheostomy.  Defer to chest and general 

surgeon.


September 22: Labs reviewed.  Renal parameters stable.  Remains full code.  

Remains on ventilator.  Due for tracheostomy tomorrow.  Continue per consulta

nts.  Patient has a right chest tube in place at this time.


September 21: Labs reviewed.  Electrolyte imbalances addressed and supplemented.

 Remains full code and on ventilator.  Continue to monitor renal parameters.  

Continue per consultants.


September 20: Labs reviewed.  Serum potassium again low today.  Potassium 

supplement IV and through GT given.  Patient remains full code and is on 

ventilator.  Continue per consultants.  Continue to monitor electrolytes and 

renal parameters.


September 19: Labs reviewed.  Abnormal electrolyte addressed.  Remains full 

code.  Remains vented.


September 18: Day 27 of hospitalization.  Full code.  Labs reviewed.  Hemoglobin

down to 7.5.  Electrolyte abnormalities addressed and corrections ordered.  

Continue to monitor renal parameters.  Continue per consultants.  Start on 

Levemir for blood sugar management.  Questioning continuation of hydrocortisone?


September 17: Labs reviewed.  Potassium, phosphorus, hemoglobin, are all low.  

Potassium and phosphorus IV replacement given.  Continue to monitor electrolytes

and CBC.  Patient remains full code.


September 16: Lab reviewed.  Low phosphorus low magnesium and low potassium was 

addressed.  Hemoglobin drifting lower.  Continue per consultants.  Patient 

remains full code.


September 15: Labs reviewed.  Patient continues to be on ventilator.  D5W for 

high sodium and also potassium chloride intravenously as supplement given.  

Hemoglobin 8.4.  Continue to monitor electrolytes and renal parameters.


September 14: Labs reviewed.  Low potassium and high sodium noted.  Hemoglobin 

8.1 stable.  Aim to correct abnormal electrolyte.  Continue rest.  Will give 2 

boluses of D5W 500 cc.


September 13: Lab reviewed.  Abnormal electrolytes noted and addressed.


September 12: Labs reviewed.  Potassium supplement given.  Patient remains full 

code.  Continue per consultants.


September 11: Lab reviewed.  Electrolyte abnormalities addressed.  Continue per 

pulmonary and ID.


September 10: Lab reviewed.  Status unchanged.  Serum sodium 151 unchanged.  

Stable from renal standpoint of view.


September 9: Labs reviewed.  Status quo.  D5W 500 cc IV ordered.  Continue to 

monitor renal parameters.


September 8: Status quo.  Labs reviewed.  Overall condition unchanged.  Patient 

was transfused and hemoglobin higher.  Continue current management.  Patient 

remains full code.


September 7: Status quo.  Overall condition poor.  Very low albumin.  Edematous.

 Hypotensive.  Hemoglobin lower.  Anemia work-up ordered.  I favor transfusion 2

units of packed RBCs.  Patient remains full code.  I favor supportive care only.

 Will discuss.


September 6: Electrolyte abnormalities addressed.  Serum creatinine lower.  

Continue per current management.


September 5: Status unchanged.  Lab reviewed.  Serum potassium 2.7.  IV 

potassium chloride ordered.  Serum creatinine low at 1.6 stable.  Blood pressure

90s systolic


September 4: Status quo.  Labs reviewed.  Renal parameters stable.  Serum 

creatinine down to 1.6.  Medication list reviewed.  Continues to be on 

midodrine.  Continue per consultants.


September 3: Status quo.  Labs reviewed.  Electrolytes adjusted.  Serum 

creatinine down to 1.8.  Continue per consultants.


September 2: Status quo.  Labs reviewed.  Phosphorus supplement IV given.  Serum

creatinine 2.  Continue per consultants.


September 1: Requires less pressors.  Albumin bolus given.  1 dose of Lasix IV 

ordered as the patient severely edematous.  Patient serum albumin is very low.  

Continue per consultants.


August 31: Continues to be intubated.  Labs reviewed.  Serum creatinine 1.9 

unchanged.  Blood pressure more stable.  Off 1 of the pressors.  Continue to 

monitor renal parameters.  Continue per consultants.  Patient now on 

hydrocortisone 100 mg every 8 hours.  Will decrease IV fluid.  Normal saline 

down to 50 cc an hour.


August 30: Intubated.  Labs reviewed.  Creatinine 1.9 unchanged.  Continue same 

treatment plan.  Per consultants.  Overall poor prognosis since the patient 

remains on pressors and her pulmonary status is worsening.


August 29: Remains intubated.  Labs reviewed.  Creatinine 1.9.  Blood pressure 

systolic 90s.  Continue per consultants.


August 28: Remains intubated.  Labs reviewed.  Serum creatinine lower to 2.  

Vancomycin level lower.  Remains hypotensive on pressors.  Will increase 

midodrine to 10 mg every 8 hours.  Continue per consultants.  Continue to 

monitor renal parameters.


August 27: Patient now in ICU.  Intubated.  On pressors.  Labs reviewed.  Will 

increase midodrine.  Aim to keep blood pressure over 100 systolic.  Will give 

albumin bolus.  Will check vancomycin level which was elevated when checked 

previously on August 24.  Will monitor renal parameters.  Continue per 

consultants.





Subjective


ROS Limited/Unobtainable:  Yes





Objective


Objective





Last 24 Hour Vital Signs








  Date Time  Temp Pulse Resp B/P (MAP) Pulse Ox O2 Delivery O2 Flow Rate FiO2


 


9/25/20 10:00  56 25 137/72 (93) 96   


 


9/25/20 09:00  52 34 155/76 (102) 97   


 


9/25/20 09:00  52 34 155/76 (102) 97   


 


9/25/20 08:00 98.9 54 26 135/67 (89) 94   


 


9/25/20 08:00        80


 


9/25/20 08:00      Mechanical Ventilator  





      Mechanical Ventilator  


 


9/25/20 07:48  56      


 


9/25/20 07:20  60 26     80


 


9/25/20 07:00  56 27 158/77 (104) 97   


 


9/25/20 06:08    136/60    


 


9/25/20 06:00  59 30 136/73 (94) 97   


 


9/25/20 05:00  58 26 130/72 (91) 96   


 


9/25/20 04:00 98.2 59 22 138/71 (93) 96   


 


9/25/20 04:00  57      


 


9/25/20 04:00        80


 


9/25/20 04:00      Mechanical Ventilator  





      Mechanical Ventilator  


 


9/25/20 03:59  65 26     80


 


9/25/20 03:00  62 27 138/71 (93) 95   


 


9/25/20 02:00  60 29 135/85 (102) 95   


 


9/25/20 01:00 98.8 63 27 139/75 (96) 90   


 


9/25/20 00:00  50 32 148/75 (99) 92   


 


9/25/20 00:00  58      


 


9/25/20 00:00        80


 


9/25/20 00:00      Mechanical Ventilator  





      Mechanical Ventilator  


 


9/24/20 23:12  58 26     80


 


9/24/20 22:00  65 30 125/65 (85) 95   


 


9/24/20 21:00 99.4 57 25 144/69 (94) 94   


 


9/24/20 20:00      Mechanical Ventilator  





      Mechanical Ventilator  


 


9/24/20 20:00        80


 


9/24/20 20:00  61 25 126/68 (87) 94   


 


9/24/20 20:00  57      


 


9/24/20 19:52  58 26     80


 


9/24/20 19:00  57 24 130/66 (87) 95   


 


9/24/20 18:00  62 24 135/70 (91) 96   


 


9/24/20 17:00  53 26 149/72 (97) 95   


 


9/24/20 16:00 99.5 55 26 153/78 (103) 96   


 


9/24/20 16:00      Mechanical Ventilator  





      Mechanical Ventilator  


 


9/24/20 16:00  56      


 


9/24/20 16:00        80


 


9/24/20 15:05  58 26     80


 


9/24/20 15:00  49 26 154/82 (106) 96   


 


9/24/20 14:00  56 28 144/71 (95) 96   


 


9/24/20 13:00  58 26 142/80 (100) 96   


 


9/24/20 12:00      Mechanical Ventilator  





      Mechanical Ventilator  


 


9/24/20 12:00        80


 


9/24/20 12:00 99.0 55 24 148/77 (100) 95   


 


9/24/20 11:23  99      


 


9/24/20 11:00  45 26 157/70 (99) 96   


 


9/24/20 10:50  46 26     80

















Intake and Output  


 


 9/24/20 9/25/20





 19:00 07:00


 


Intake Total 800 ml 900 ml


 


Output Total 1900 ml 1090 ml


 


Balance -1100 ml -190 ml


 


  


 


Free Water 200 ml 300 ml


 


Tube Feeding 600 ml 600 ml


 


Output Urine Total 1900 ml 840 ml


 


Chest Tube Drainage Total  250 ml


 


# Bowel Movements  1








Laboratory Tests


9/24/20 21:04: POC Whole Blood Glucose [Pending]


9/25/20 01:09: POC Whole Blood Glucose [Pending]


9/25/20 04:40: 


Sodium Level 147H, Potassium Level 3.7, Chloride Level 113H, Carbon Dioxide 

Level 27, Anion Gap 7, Blood Urea Nitrogen 33H, Creatinine 0.7, Estimat 

Glomerular Filtration Rate > 60, Glucose Level 175H, Calcium Level 7.7L, Total 

Bilirubin 0.7, Aspartate Amino Transf (AST/SGOT) 29, Alanine Aminotransferase 

(ALT/SGPT) 38, Alkaline Phosphatase 49, Pro-B-Type Natriuretic Peptide 7364H, 

Total Protein 4.3L, Albumin 1.1L, Globulin 3.2, Albumin/Globulin Ratio 0.3L


9/25/20 07:35: 


White Blood Count 10.2, Red Blood Count 3.23L, Hemoglobin 10.2L, Hematocrit 

30.4L, Mean Corpuscular Volume 94, Mean Corpuscular Hemoglobin 31.6H, Mean 

Corpuscular Hemoglobin Concent 33.6, Red Cell Distribution Width 17.1H, Platelet

 Count 82L, Mean Platelet Volume 8.8, Neutrophils (%) (Auto) , Lymphocytes (%) 

(Auto) , Monocytes (%) (Auto) , Eosinophils (%) (Auto) , Basophils (%) (Auto) , 

Neutrophils % (Manual) [Pending], Lymphocytes % (Manual) [Pending], Platelet 

Estimate [Pending], Platelet Morphology [Pending], Phosphorus Level 2.4L, 

Magnesium Level 1.7L


9/25/20 09:32: POC Whole Blood Glucose 152H


Height (Feet):  5


Height (Inches):  3.00


Weight (Pounds):  172


General Appearance:  no apparent distress


EENT:  other - Intubated on ventilator


Cardiovascular:  normal rate


Respiratory/Chest:  decreased breath sounds


Abdomen:  distended











Lam Nino MD            Sep 25, 2020 10:48

## 2020-09-25 NOTE — NUR
NURSE NOTES:



Received patient from Roger CHANEL RN. Orally intubated, ET size of 7.5, 22 cm in the lip, 
mechanical ventilator dependent. Ventilator settings of AC 26, Tidal Volume 500, FiO2 of 
80%, no PEEP. On continuous GTF of Vital running at 50ml/hour. Left upper arm PICC noted. 
Valle cath inplace with yellow clear urine output noted in drainage bag by gravity. Contact 
isolation observed. Will continue plan of care.

## 2020-09-26 VITALS — SYSTOLIC BLOOD PRESSURE: 131 MMHG | DIASTOLIC BLOOD PRESSURE: 65 MMHG

## 2020-09-26 VITALS — SYSTOLIC BLOOD PRESSURE: 111 MMHG | DIASTOLIC BLOOD PRESSURE: 95 MMHG

## 2020-09-26 VITALS — DIASTOLIC BLOOD PRESSURE: 83 MMHG | SYSTOLIC BLOOD PRESSURE: 107 MMHG

## 2020-09-26 VITALS — DIASTOLIC BLOOD PRESSURE: 62 MMHG | SYSTOLIC BLOOD PRESSURE: 107 MMHG

## 2020-09-26 VITALS — SYSTOLIC BLOOD PRESSURE: 120 MMHG | DIASTOLIC BLOOD PRESSURE: 67 MMHG

## 2020-09-26 VITALS — SYSTOLIC BLOOD PRESSURE: 120 MMHG | DIASTOLIC BLOOD PRESSURE: 90 MMHG

## 2020-09-26 VITALS — DIASTOLIC BLOOD PRESSURE: 63 MMHG | SYSTOLIC BLOOD PRESSURE: 138 MMHG

## 2020-09-26 VITALS — SYSTOLIC BLOOD PRESSURE: 110 MMHG | DIASTOLIC BLOOD PRESSURE: 61 MMHG

## 2020-09-26 VITALS — SYSTOLIC BLOOD PRESSURE: 98 MMHG | DIASTOLIC BLOOD PRESSURE: 64 MMHG

## 2020-09-26 VITALS — SYSTOLIC BLOOD PRESSURE: 120 MMHG | DIASTOLIC BLOOD PRESSURE: 105 MMHG

## 2020-09-26 VITALS — DIASTOLIC BLOOD PRESSURE: 86 MMHG | SYSTOLIC BLOOD PRESSURE: 108 MMHG

## 2020-09-26 VITALS — SYSTOLIC BLOOD PRESSURE: 110 MMHG | DIASTOLIC BLOOD PRESSURE: 73 MMHG

## 2020-09-26 VITALS — SYSTOLIC BLOOD PRESSURE: 107 MMHG | DIASTOLIC BLOOD PRESSURE: 63 MMHG

## 2020-09-26 VITALS — SYSTOLIC BLOOD PRESSURE: 101 MMHG | DIASTOLIC BLOOD PRESSURE: 78 MMHG

## 2020-09-26 VITALS — DIASTOLIC BLOOD PRESSURE: 79 MMHG | SYSTOLIC BLOOD PRESSURE: 103 MMHG

## 2020-09-26 VITALS — DIASTOLIC BLOOD PRESSURE: 65 MMHG | SYSTOLIC BLOOD PRESSURE: 125 MMHG

## 2020-09-26 VITALS — SYSTOLIC BLOOD PRESSURE: 149 MMHG | DIASTOLIC BLOOD PRESSURE: 107 MMHG

## 2020-09-26 VITALS — DIASTOLIC BLOOD PRESSURE: 62 MMHG | SYSTOLIC BLOOD PRESSURE: 126 MMHG

## 2020-09-26 VITALS — SYSTOLIC BLOOD PRESSURE: 110 MMHG | DIASTOLIC BLOOD PRESSURE: 59 MMHG

## 2020-09-26 VITALS — SYSTOLIC BLOOD PRESSURE: 106 MMHG | DIASTOLIC BLOOD PRESSURE: 93 MMHG

## 2020-09-26 VITALS — DIASTOLIC BLOOD PRESSURE: 87 MMHG | SYSTOLIC BLOOD PRESSURE: 126 MMHG

## 2020-09-26 VITALS — DIASTOLIC BLOOD PRESSURE: 67 MMHG | SYSTOLIC BLOOD PRESSURE: 109 MMHG

## 2020-09-26 VITALS — DIASTOLIC BLOOD PRESSURE: 65 MMHG | SYSTOLIC BLOOD PRESSURE: 106 MMHG

## 2020-09-26 VITALS — SYSTOLIC BLOOD PRESSURE: 144 MMHG | DIASTOLIC BLOOD PRESSURE: 76 MMHG

## 2020-09-26 LAB
ADD MANUAL DIFF: YES
ALBUMIN SERPL-MCNC: 1.1 G/DL (ref 3.4–5)
ALBUMIN/GLOB SERPL: 0.4 {RATIO} (ref 1–2.7)
ALP SERPL-CCNC: 54 U/L (ref 46–116)
ALT SERPL-CCNC: 52 U/L (ref 12–78)
ANION GAP SERPL CALC-SCNC: 4 MMOL/L (ref 5–15)
AST SERPL-CCNC: 30 U/L (ref 15–37)
BILIRUB SERPL-MCNC: 0.8 MG/DL (ref 0.2–1)
BUN SERPL-MCNC: 29 MG/DL (ref 7–18)
CALCIUM SERPL-MCNC: 7.2 MG/DL (ref 8.5–10.1)
CHLORIDE SERPL-SCNC: 113 MMOL/L (ref 98–107)
CO2 SERPL-SCNC: 31 MMOL/L (ref 21–32)
CREAT SERPL-MCNC: 0.7 MG/DL (ref 0.55–1.3)
ERYTHROCYTE [DISTWIDTH] IN BLOOD BY AUTOMATED COUNT: 16.7 % (ref 11.6–14.8)
GLOBULIN SER-MCNC: 2.9 G/DL
HCT VFR BLD CALC: 28 % (ref 37–47)
HGB BLD-MCNC: 9.5 G/DL (ref 12–16)
MCV RBC AUTO: 94 FL (ref 80–99)
PHOSPHATE SERPL-MCNC: 2 MG/DL (ref 2.5–4.9)
PLATELET # BLD: 86 K/UL (ref 150–450)
POTASSIUM SERPL-SCNC: 2.8 MMOL/L (ref 3.5–5.1)
RBC # BLD AUTO: 2.96 M/UL (ref 4.2–5.4)
SODIUM SERPL-SCNC: 148 MMOL/L (ref 136–145)
WBC # BLD AUTO: 8.8 K/UL (ref 4.8–10.8)

## 2020-09-26 RX ADMIN — HYDROCORTISONE SODIUM SUCCINATE SCH MG: 100 INJECTION, POWDER, FOR SOLUTION INTRAMUSCULAR; INTRAVENOUS at 05:47

## 2020-09-26 RX ADMIN — MIDODRINE HYDROCHLORIDE SCH MG: 10 TABLET ORAL at 22:38

## 2020-09-26 RX ADMIN — MIDODRINE HYDROCHLORIDE SCH MG: 10 TABLET ORAL at 13:12

## 2020-09-26 RX ADMIN — HYDROCORTISONE SODIUM SUCCINATE SCH MG: 100 INJECTION, POWDER, FOR SOLUTION INTRAMUSCULAR; INTRAVENOUS at 22:38

## 2020-09-26 RX ADMIN — INSULIN ASPART SCH UNITS: 100 INJECTION, SOLUTION INTRAVENOUS; SUBCUTANEOUS at 00:05

## 2020-09-26 RX ADMIN — CHLORHEXIDINE GLUCONATE SCH APPLIC: 213 SOLUTION TOPICAL at 20:31

## 2020-09-26 RX ADMIN — INSULIN ASPART SCH UNITS: 100 INJECTION, SOLUTION INTRAVENOUS; SUBCUTANEOUS at 13:13

## 2020-09-26 RX ADMIN — INSULIN ASPART SCH UNITS: 100 INJECTION, SOLUTION INTRAVENOUS; SUBCUTANEOUS at 17:12

## 2020-09-26 RX ADMIN — HYDROCORTISONE SODIUM SUCCINATE SCH MG: 100 INJECTION, POWDER, FOR SOLUTION INTRAMUSCULAR; INTRAVENOUS at 13:12

## 2020-09-26 RX ADMIN — INSULIN DETEMIR SCH UNITS: 100 INJECTION, SOLUTION SUBCUTANEOUS at 10:04

## 2020-09-26 RX ADMIN — INSULIN ASPART SCH UNITS: 100 INJECTION, SOLUTION INTRAVENOUS; SUBCUTANEOUS at 05:49

## 2020-09-26 RX ADMIN — Medication SCH MLS/HR: at 16:46

## 2020-09-26 RX ADMIN — MIDODRINE HYDROCHLORIDE SCH MG: 10 TABLET ORAL at 05:47

## 2020-09-26 RX ADMIN — Medication SCH MLS/HR: at 20:34

## 2020-09-26 RX ADMIN — INSULIN DETEMIR SCH UNITS: 100 INJECTION, SOLUTION SUBCUTANEOUS at 20:33

## 2020-09-26 RX ADMIN — INSULIN ASPART SCH UNITS: 100 INJECTION, SOLUTION INTRAVENOUS; SUBCUTANEOUS at 23:35

## 2020-09-26 NOTE — NUR
NURSE NOTES:

complete bed bath oral care back care  wound care done  chest care done reposition and 
suction

## 2020-09-26 NOTE — NUR
NURSE NOTES:

Central line dressing changed. Turned and repositioned for comfort. Now signs of distress 
noted. Will continue to monitor.

## 2020-09-26 NOTE — INFECTIOUS DISEASES PROG NOTE
Assessment/Plan





ASSESSMENT:


sp code blue 8/26


Septic Shock; SP


Fever, recurrent- low grade 


Leukocytosis; persistent/fluctuating, SP


     -9/19 Bcx NTD


   -9/9 u/a no pyuria


   -9/8 Bcx NTD (Picc line)


   -9/6 Bcx NTD


            ucx Neg


             sp cx C. parapsilopsis


  -8/26 u/a no pyuria





Pneumonia.- COVID 19 neg x3


   Acute hypoxic resp failure on VM> NRB 15l 100%; hypoxic on ABG> now VDRF 

8/26- Fio2 80% >100% 8/28> 60% 9/1 >80% 9/2   >95% 9/10 >90% 9/14 >60% 9/15


R tension Pneumothroax sp CT 9/21


       -9/22 CXR: Interim complete reexpansion of right lung without evidence of

residual pneumothorax. Bilateral diffuse and extensive infiltrates. Markedly 

improved chest wall subcutaneous emphysema


       -9/21 CXR: Interim reexpansion of previously demonstrated right 

pneumothorax, status post large bore chest tube placement.


      -9/14 CXR: Improved aeration of both lungs.


       -9/5 CXR:Small bilateral pleural effusions with minor edema.  Stable 

edema with  mild worsening in the degree of pleural effusion on the right.


         -9/1 sp cx Neg


         8/31 CXR: Extensive bilateral interstitial and airspace disease appears

similar to the prior exam. Moderate to large bilateral pleural effusions appear 

unchanged.


   8/28 CXR: Increasing left upper lobe dense consolidation and likely 

increasing bilateral pleural fluid. Persistent diffuse dense consolidation el

sewhere


            -8/26 sp cx normal resp lilliam


             -8/25 CXR: Increased atelectasis of the right lung, since prior e

xam of 3 days earlier. New or increased right pleural effusion. Increased left 

basilar consolidation and/or pleural fluid 


          -COVID Rapid PCR neg 8/23, 8/23, 8/26


          -8/22 spc x Group G strep


          -8/22 CXR:   Reduced lung volumes.  Patchy bilateral predominantly 

interstitial  pulmonary opacities.  Could be from edema and/or pneumonia.  There

is a broader differential.


 


        -legionella ag urine, blasto ab, Histo ab, HIV ab screen, JOY, ANCA neg





Persistent, high grade bacteremia-


     -8/22 Bcx 4/4 sets S. haemolyticus; 8/23 Bcx 3/4 S/ epi; 8/27 Bcx 1.4 S. 

warnerri; 8/29 Bcx Neg


     -2d echo: no vegetaions seen





          ua/ wbc 10-15, nit neg, leuk +1; ucx Neg





DAVID; 


 -supratherapeutic vanco levels





-Seizure disorder.


- Hypothyroidism.


- Down syndrome.





History of PEG tube placement.


[facility]








PLAN:


Monitor off abx, if more episode of fever, will start Empiric AB Rx 





          9/14 SP Meropenem #18, IV AMikacin #7


          9/4/20 SP Daptomycin #11


          9/2 SP MIcafungin #7, Linezolid #5


   8/28 SP Azithromycin #7/7


   8/26 SP Ceftriaxone #2


   8/25 SP IV Vancomycin #4, Zosyn #4


   8/22 SP Cefepime x1, Flagyl x1





- Monitor CBC, BMP.


.f/u cx


- COVID neg x3


- Monitor chest x-ray.


- Monitor the patient's clinical course and labs.  Based on those, we will do 

further recommendation.


-f/u Fungitell, asp ag, flow cytometry


-poor prognosis











Thank you, Dr. Cherry, for allowing me to participate in the care of


this patient.  I will follow the patient with you at this


hospitalization.








Discussed with RN





Subjective


Allergies:  


Coded Allergies:  


     No Known Allergies (Unverified , 1/8/19)





AF


Remains on vent


WBC 8





Objective





Last 24 Hour Vital Signs








  Date Time  Temp Pulse Resp B/P (MAP) Pulse Ox O2 Delivery O2 Flow Rate FiO2


 


9/26/20 07:00  55 26 126/62 (83) 98   


 


9/26/20 06:00  47 26 120/105 (110) 98   


 


9/26/20 05:00  58 26 131/65 (87) 98   


 


9/26/20 04:00      Mechanical Ventilator  





      Mechanical Ventilator  


 


9/26/20 04:00  56      


 


9/26/20 04:00        80


 


9/26/20 04:00 98.6 49 26 107/62 (77) 97   


 


9/26/20 03:37  54 26     80


 


9/26/20 03:00  65 27 120/90 (100) 99   


 


9/26/20 02:00  59 25 126/87 (100) 99   


 


9/26/20 01:00  55 26 144/76 (98) 100   


 


9/26/20 00:00      Mechanical Ventilator  





      Mechanical Ventilator  


 


9/26/20 00:00  57      


 


9/26/20 00:00 98.2 57 26 106/65 (79) 97   


 


9/26/20 00:00        80


 


9/25/20 23:37  61 26     80


 


9/25/20 23:00 98.2 57 26 106/65 (79) 97   


 


9/25/20 22:00  57 26 115/61 (79) 97   


 


9/25/20 21:00  62 26 98/53 (68) 97   


 


9/25/20 20:00        80


 


9/25/20 20:00  64 26 103/71 (82) 96   


 


9/25/20 20:00      Mechanical Ventilator  





      Mechanical Ventilator  


 


9/25/20 20:00  64      


 


9/25/20 19:49  61 26     80


 


9/25/20 19:00  67 27 118/71 (87) 97   


 


9/25/20 18:00  66 20 135/73 (93) 97   


 


9/25/20 17:00  59 25 113/94 (100) 98   


 


9/25/20 16:26  60      


 


9/25/20 16:00      Mechanical Ventilator  





      Mechanical Ventilator  


 


9/25/20 16:00 98.7 63 27 128/65 (86) 96   


 


9/25/20 16:00        80


 


9/25/20 15:22  68 26     80


 


9/25/20 15:00  61 25 123/69 (87) 97   


 


9/25/20 14:00  64 24 119/89 (99) 97   


 


9/25/20 13:00  64 25 130/70 (90) 96   


 


9/25/20 12:10  52      


 


9/25/20 12:00 98.9 58 26 141/76 (97) 96   


 


9/25/20 12:00        80


 


9/25/20 12:00      Mechanical Ventilator  





      Mechanical Ventilator  


 


9/25/20 11:47  55 26     80


 


9/25/20 11:15    143/78    


 


9/25/20 11:00  50 26 143/78 (99) 96   


 


9/25/20 10:00  56 25 137/72 (93) 96   


 


9/25/20 09:00  52 34 155/76 (102) 97   


 


9/25/20 09:00  52 34 155/76 (102) 97   


 


9/25/20 08:00 98.9 54 26 135/67 (89) 94   


 


9/25/20 08:00        80


 


9/25/20 08:00      Mechanical Ventilator  





      Mechanical Ventilator  








Height (Feet):  5


Height (Inches):  3.00


Weight (Pounds):  172





Gen: NAD


Pulm: BL chest rise


Abd: Non-distended


Ext: No c/c/e


Skin: No visible skin rashes





Laboratory Tests








Test


 9/25/20


09:32 9/25/20


21:02 9/25/20


23:57 9/26/20


04:00


 


POC Whole Blood Glucose


 152 MG/DL


()  H 166 MG/DL


()  H 186 MG/DL


()  H 





 


White Blood Count


 


 


 


 8.8 K/UL


(4.8-10.8)


 


Red Blood Count


 


 


 


 2.96 M/UL


(4.20-5.40)  L


 


Hemoglobin


 


 


 


 9.5 G/DL


(12.0-16.0)  L


 


Hematocrit


 


 


 


 28.0 %


(37.0-47.0)  L


 


Mean Corpuscular Volume    94 FL (80-99)  


 


Mean Corpuscular Hemoglobin


 


 


 


 31.9 PG


(27.0-31.0)  H


 


Mean Corpuscular Hemoglobin


Concent 


 


 


 33.8 G/DL


(32.0-36.0)


 


Red Cell Distribution Width


 


 


 


 16.7 %


(11.6-14.8)  H


 


Platelet Count


 


 


 


 86 K/UL


(150-450)  L


 


Mean Platelet Volume


 


 


 


 9.4 FL


(6.5-10.1)


 


Neutrophils (%) (Auto)


 


 


 


 % (45.0-75.0)





 


Lymphocytes (%) (Auto)


 


 


 


 % (20.0-45.0)





 


Monocytes (%) (Auto)     % (1.0-10.0)  


 


Eosinophils (%) (Auto)     % (0.0-3.0)  


 


Basophils (%) (Auto)     % (0.0-2.0)  


 


Neutrophils % (Manual)    Pending  


 


Lymphocytes % (Manual)    Pending  


 


Platelet Estimate    Pending  


 


Platelet Morphology    Pending  


 


Sodium Level


 


 


 


 148 MMOL/L


(136-145)  H


 


Potassium Level


 


 


 


 2.8 MMOL/L


(3.5-5.1)  L


 


Chloride Level


 


 


 


 113 MMOL/L


()  H


 


Carbon Dioxide Level


 


 


 


 31 MMOL/L


(21-32)


 


Anion Gap


 


 


 


 4 mmol/L


(5-15)  L


 


Blood Urea Nitrogen


 


 


 


 29 mg/dL


(7-18)  H


 


Creatinine


 


 


 


 0.7 MG/DL


(0.55-1.30)


 


Estimat Glomerular Filtration


Rate 


 


 


 > 60 mL/min


(>60)


 


Glucose Level


 


 


 


 177 MG/DL


()  H


 


Calcium Level


 


 


 


 7.2 MG/DL


(8.5-10.1)  L


 


Phosphorus Level


 


 


 


 2.0 MG/DL


(2.5-4.9)  L


 


Magnesium Level


 


 


 


 1.6 MG/DL


(1.8-2.4)  L


 


Total Bilirubin


 


 


 


 0.8 MG/DL


(0.2-1.0)


 


Aspartate Amino Transf


(AST/SGOT) 


 


 


 30 U/L (15-37)





 


Alanine Aminotransferase


(ALT/SGPT) 


 


 


 52 U/L (12-78)





 


Alkaline Phosphatase


 


 


 


 54 U/L


()


 


Total Protein


 


 


 


 4.0 G/DL


(6.4-8.2)  L


 


Albumin


 


 


 


 1.1 G/DL


(3.4-5.0)  L


 


Globulin    2.9 g/dL  


 


Albumin/Globulin Ratio


 


 


 


 0.4 (1.0-2.7)


L











Current Medications








 Medications


  (Trade)  Dose


 Ordered  Sig/Didi


 Route


 PRN Reason  Start Time


 Stop Time Status Last Admin


Dose Admin


 


 Acetaminophen


  (Tylenol)  650 mg  Q4H  PRN


 GT


 For Pain  9/23/20 17:15


 10/23/20 17:14  9/23/20 17:23





 


 Chlorhexidine


 Gluconate


  (Beatrice-Hex 2%)  1 applic  DAILY@2000


 TOPIC


   8/31/20 20:00


 11/29/20 19:59  9/25/20 20:57





 


 Clotrimazole


  (Lotrimin)  1 applic  Q12HR


 TOPIC


   8/30/20 13:00


 11/28/20 12:59  9/25/20 20:57





 


 Dextrose


  (Dextrose 50%)  25 ml  Q30M  PRN


 IV


 Hypoglycemia  8/26/20 11:30


 11/20/20 11:29   





 


 Dextrose


  (Dextrose 50%)  50 ml  Q30M  PRN


 IV


 Hypoglycemia  8/26/20 11:30


 11/20/20 11:29   





 


 Hydrocortisone


  (Solu-CORTEF)  50 mg  EVERY 8  HOURS


 IV


   9/25/20 22:00


 11/28/20 13:59  9/26/20 05:47





 


 Insulin Aspart


  (NovoLOG)    Q6HR


 SUBQ


   9/18/20 12:00


 12/17/20 11:59  9/26/20 05:49





 


 Insulin Detemir


  (Levemir)  10 units  Q12HR


 SUBQ


   9/18/20 10:00


 12/17/20 09:59  9/25/20 21:06





 


 Loperamide HCl


  (Imodium)  2 mg  Q6H  PRN


 NG


 Diarrhea  9/16/20 10:30


 10/16/20 10:29  9/23/20 11:41





 


 Midodrine


  (Pro-Amatine)  10 mg  Q8HR


 GT


   8/28/20 14:00


 11/26/20 13:59  9/26/20 05:47





 


 Potassium Chloride


  (K-Dur)  40 meq  TWICE A  DAY


 GT


   9/20/20 12:15


 12/19/20 12:14  9/25/20 17:47




















Donna Schmitt M.D.               Sep 26, 2020 08:01

## 2020-09-26 NOTE — DIAGNOSTIC IMAGING REPORT
EXAM:

  XR Chest, 1 View

 

CLINICAL HISTORY:

  DYSPNEA

 

TECHNIQUE:

  Frontal view of the chest.

 

COMPARISON:

Chest radiograph September 25, 2020.

 

FINDINGS/IMPRESSION:

Endotracheal tube terminates approximately 4.0 cm above the lisandro.  EKG 

leads overlie the patient.

 

Extensive bilateral airspace opacities consistent with multifocal 

infiltrate, similar in appearance to chest radiograph from September 25, 2020.

 

Right-sided chest tube, unchanged.  No definite pneumothorax.

 

Cardiomegaly.

## 2020-09-26 NOTE — NEPHROLOGY PROGRESS NOTE
Assessment/Plan


Problem List:  


(1) DAVID (acute kidney injury)


(2) Respiratory failure requiring intubation


(3) Down's syndrome


(4) Seizure disorder


(5) Hypothyroidism


Assessment





Acute renal failure, likely due to hypotension


Acute respiratory distress, hypoxia


Seizure disorder


Hypothyroidism


Down syndrome


Full code











Fluid challenge with IV fluids and albumin


Midodrine for BP above 100 systolic


Check TSH level


Check


Correct level


Monitor renal parameters


Urine studies


Per orders


Plan


September 26: Labs reviewed.  Discussed with RN.  Potassium and phosphorus and 

magnesium replacement ordered.  Hemoglobin 9.5.  Patient remains full code.  

Continue per consultants.


September 25: Lab reviewed.  Renal parameters stable.  Full code.  Intubated.  

Electrolyte abnormalities addressed and replacement done.


September 24: Lab reviewed.  Renal parameters stable.  Full code.  Intubated.  

Has right side chest tube.  Continue per consultants.


September 23: Lab reviewed.  Renal parameters stable.  Full code.  Has right 

chest tube.  Planning process for tracheostomy.  Defer to chest and general 

surgeon.


September 22: Labs reviewed.  Renal parameters stable.  Remains full code.  

Remains on ventilator.  Due for tracheostomy tomorrow.  Continue per 

consultants.  Patient has a right chest tube in place at this time.


September 21: Labs reviewed.  Electrolyte imbalances addressed and supplemented.

 Remains full code and on ventilator.  Continue to monitor renal parameters.  

Continue per consultants.


September 20: Labs reviewed.  Serum potassium again low today.  Potassium 

supplement IV and through GT given.  Patient remains full code and is on 

ventilator.  Continue per consultants.  Continue to monitor electrolytes and 

renal parameters.


September 19: Labs reviewed.  Abnormal electrolyte addressed.  Remains full 

code.  Remains vented.


September 18: Day 27 of hospitalization.  Full code.  Labs reviewed.  Hemoglobin

down to 7.5.  Electrolyte abnormalities addressed and corrections ordered.  

Continue to monitor renal parameters.  Continue per consultants.  Start on 

Levemir for blood sugar management.  Questioning continuation of hydrocortisone?


September 17: Labs reviewed.  Potassium, phosphorus, hemoglobin, are all low.  

Potassium and phosphorus IV replacement given.  Continue to monitor electrolytes

and CBC.  Patient remains full code.


September 16: Lab reviewed.  Low phosphorus low magnesium and low potassium was 

addressed.  Hemoglobin drifting lower.  Continue per consultants.  Patient 

remains full code.


September 15: Labs reviewed.  Patient continues to be on ventilator.  D5W for 

high sodium and also potassium chloride intravenously as supplement given.  

Hemoglobin 8.4.  Continue to monitor electrolytes and renal parameters.


September 14: Labs reviewed.  Low potassium and high sodium noted.  Hemoglobin 

8.1 stable.  Aim to correct abnormal electrolyte.  Continue rest.  Will give 2 

boluses of D5W 500 cc.


September 13: Lab reviewed.  Abnormal electrolytes noted and addressed.


September 12: Labs reviewed.  Potassium supplement given.  Patient remains full 

code.  Continue per consultants.


September 11: Lab reviewed.  Electrolyte abnormalities addressed.  Continue per 

pulmonary and ID.


September 10: Lab reviewed.  Status unchanged.  Serum sodium 151 unchanged.  

Stable from renal standpoint of view.


September 9: Labs reviewed.  Status quo.  D5W 500 cc IV ordered.  Continue to 

monitor renal parameters.


September 8: Status quo.  Labs reviewed.  Overall condition unchanged.  Patient 

was transfused and hemoglobin higher.  Continue current management.  Patient 

remains full code.


September 7: Status quo.  Overall condition poor.  Very low albumin.  Edematous.

 Hypotensive.  Hemoglobin lower.  Anemia work-up ordered.  I favor transfusion 2

units of packed RBCs.  Patient remains full code.  I favor supportive care only.

 Will discuss.


September 6: Electrolyte abnormalities addressed.  Serum creatinine lower.  

Continue per current management.


September 5: Status unchanged.  Lab reviewed.  Serum potassium 2.7.  IV 

potassium chloride ordered.  Serum creatinine low at 1.6 stable.  Blood pressure

90s systolic


September 4: Status quo.  Labs reviewed.  Renal parameters stable.  Serum 

creatinine down to 1.6.  Medication list reviewed.  Continues to be on 

midodrine.  Continue per consultants.


September 3: Status quo.  Labs reviewed.  Electrolytes adjusted.  Serum cre

atinine down to 1.8.  Continue per consultants.


September 2: Status quo.  Labs reviewed.  Phosphorus supplement IV given.  Serum

creatinine 2.  Continue per consultants.


September 1: Requires less pressors.  Albumin bolus given.  1 dose of Lasix IV 

ordered as the patient severely edematous.  Patient serum albumin is very low.  

Continue per consultants.


August 31: Continues to be intubated.  Labs reviewed.  Serum creatinine 1.9 

unchanged.  Blood pressure more stable.  Off 1 of the pressors.  Continue to 

monitor renal parameters.  Continue per consultants.  Patient now on 

hydrocortisone 100 mg every 8 hours.  Will decrease IV fluid.  Normal saline 

down to 50 cc an hour.


August 30: Intubated.  Labs reviewed.  Creatinine 1.9 unchanged.  Continue same 

treatment plan.  Per consultants.  Overall poor prognosis since the patient 

remains on pressors and her pulmonary status is worsening.


August 29: Remains intubated.  Labs reviewed.  Creatinine 1.9.  Blood pressure 

systolic 90s.  Continue per consultants.


August 28: Remains intubated.  Labs reviewed.  Serum creatinine lower to 2.  

Vancomycin level lower.  Remains hypotensive on pressors.  Will increase 

midodrine to 10 mg every 8 hours.  Continue per consultants.  Continue to 

monitor renal parameters.


August 27: Patient now in ICU.  Intubated.  On pressors.  Labs reviewed.  Will 

increase midodrine.  Aim to keep blood pressure over 100 systolic.  Will give 

albumin bolus.  Will check vancomycin level which was elevated when checked 

previously on August 24.  Will monitor renal parameters.  Continue per 

consultants.





Subjective


ROS Limited/Unobtainable:  Yes





Objective


Objective





Last 24 Hour Vital Signs








  Date Time  Temp Pulse Resp B/P (MAP) Pulse Ox O2 Delivery O2 Flow Rate FiO2


 


9/26/20 08:25        70


 


9/26/20 08:20        70


 


9/26/20 08:00 98.8 50 26 125/65 (85) 98   


 


9/26/20 08:00      Mechanical Ventilator  





      Mechanical Ventilator  


 


9/26/20 08:00        80


 


9/26/20 07:05  51 26     80


 


9/26/20 07:00  55 26 126/62 (83) 98   


 


9/26/20 06:00  47 26 120/105 (110) 98   


 


9/26/20 05:00  58 26 131/65 (87) 98   


 


9/26/20 04:00      Mechanical Ventilator  





      Mechanical Ventilator  


 


9/26/20 04:00  56      


 


9/26/20 04:00        80


 


9/26/20 04:00 98.6 49 26 107/62 (77) 97   


 


9/26/20 03:37  54 26     80


 


9/26/20 03:00  65 27 120/90 (100) 99   


 


9/26/20 02:00  59 25 126/87 (100) 99   


 


9/26/20 01:00  55 26 144/76 (98) 100   


 


9/26/20 00:00      Mechanical Ventilator  





      Mechanical Ventilator  


 


9/26/20 00:00  57      


 


9/26/20 00:00 98.2 57 26 106/65 (79) 97   


 


9/26/20 00:00        80


 


9/25/20 23:37  61 26     80


 


9/25/20 23:00 98.2 57 26 106/65 (79) 97   


 


9/25/20 22:00  57 26 115/61 (79) 97   


 


9/25/20 21:00  62 26 98/53 (68) 97   


 


9/25/20 20:00        80


 


9/25/20 20:00  64 26 103/71 (82) 96   


 


9/25/20 20:00      Mechanical Ventilator  





      Mechanical Ventilator  


 


9/25/20 20:00  64      


 


9/25/20 19:49  61 26     80


 


9/25/20 19:00  67 27 118/71 (87) 97   


 


9/25/20 18:00  66 20 135/73 (93) 97   


 


9/25/20 17:00  59 25 113/94 (100) 98   


 


9/25/20 16:26  60      


 


9/25/20 16:00      Mechanical Ventilator  





      Mechanical Ventilator  


 


9/25/20 16:00 98.7 63 27 128/65 (86) 96   


 


9/25/20 16:00        80


 


9/25/20 15:22  68 26     80


 


9/25/20 15:00  61 25 123/69 (87) 97   


 


9/25/20 14:00  64 24 119/89 (99) 97   


 


9/25/20 13:00  64 25 130/70 (90) 96   


 


9/25/20 12:10  52      


 


9/25/20 12:00 98.9 58 26 141/76 (97) 96   


 


9/25/20 12:00        80


 


9/25/20 12:00      Mechanical Ventilator  





      Mechanical Ventilator  


 


9/25/20 11:47  55 26     80


 


9/25/20 11:15    143/78    


 


9/25/20 11:00  50 26 143/78 (99) 96   


 


9/25/20 10:00  56 25 137/72 (93) 96   

















Intake and Output  


 


 9/25/20 9/26/20





 19:00 07:00


 


Intake Total 700 ml 800 ml


 


Output Total 690 ml 1130 ml


 


Balance 10 ml -330 ml


 


  


 


Free Water  200 ml


 


Tube Feeding 600 ml 600 ml


 


Other 100 ml 


 


Output Urine Total 640 ml 1055 ml


 


Chest Tube Drainage Total 50 ml 75 ml


 


# Bowel Movements 1 3








Laboratory Tests


9/25/20 09:32: POC Whole Blood Glucose 152H


9/25/20 21:02: POC Whole Blood Glucose 166H


9/25/20 23:57: POC Whole Blood Glucose 186H


9/26/20 04:00: 


White Blood Count 8.8, Red Blood Count 2.96L, Hemoglobin 9.5L, Hematocrit 28.0L,

Mean Corpuscular Volume 94, Mean Corpuscular Hemoglobin 31.9H, Mean Corpuscular 

Hemoglobin Concent 33.8, Red Cell Distribution Width 16.7H, Platelet Count 86L, 

Mean Platelet Volume 9.4, Neutrophils (%) (Auto) , Lymphocytes (%) (Auto) , 

Monocytes (%) (Auto) , Eosinophils (%) (Auto) , Basophils (%) (Auto) , 

Differential Total Cells Counted 100, Neutrophils % (Manual) 88H, Lymphocytes % 

(Manual) 6L, Monocytes % (Manual) 6, Eosinophils % (Manual) 0, Basophils % 

(Manual) 0, Band Neutrophils 0, Platelet Estimate DecreasedL, Platelet 

Morphology Normal, Hypochromasia 2+, Anisocytosis 1+, Sodium Level 148H, P

otassium Level 2.8L, Chloride Level 113H, Carbon Dioxide Level 31, Anion Gap 4L,

Blood Urea Nitrogen 29H, Creatinine 0.7, Estimat Glomerular Filtration Rate > 

60, Glucose Level 177H, Calcium Level 7.2L, Phosphorus Level 2.0L, Magnesium L

evel 1.6L, Total Bilirubin 0.8, Aspartate Amino Transf (AST/SGOT) 30, Alanine 

Aminotransferase (ALT/SGPT) 52, Alkaline Phosphatase 54, Total Protein 4.0L, 

Albumin 1.1L, Globulin 2.9, Albumin/Globulin Ratio 0.4L


9/26/20 07:57: 


Arterial Blood pH 7.533H, Arterial Blood Partial Pressure CO2 32.2L, Arterial 

Blood Partial Pressure O2 87.4, Arterial Blood HCO3 26.5H, Arterial Blood Oxygen

Saturation 96.1, Arterial Blood Base Excess 4.0H, Cole Test Positive


Height (Feet):  5


Height (Inches):  3.00


Weight (Pounds):  172


General Appearance:  no apparent distress


EENT:  other - Intubated on ventilator


Cardiovascular:  bradycardia


Respiratory/Chest:  decreased breath sounds


Abdomen:  distended











Lam Nino MD            Sep 26, 2020 09:17

## 2020-09-26 NOTE — NUR
NURSE HAND-OFF REPORT: 



Latest Vital Signs: Temperature 98.6 , Pulse 47 , B/P 120 /105 , Respiratory Rate 26 , O2 
SAT 98 , Mechanical Ventilator, O2 Flow Rate 15.0 .  

Vital Sign Comment: 



EKG Rhythm: Sinus Bradycardia

Rhythm change?: N 

MD Notified?: RENU Carver MD Response: No New Orders Received



Latest Momin Fall Score: 70  

Fall Risk: High Risk 

Safety Measures: Call light Within Reach, Bed Alarm Zone 1, Side Rails Side Rails x3, Bed 
position Low and Locked.

Fall Precautions: 

Yellow Socks

Door Sign

Patient Fall Education



Report given to star [email or web address].

## 2020-09-26 NOTE — NUR
NURSE NOTES:

Wound care pictures taken and dressing changes done. Bed bath with CHG also provided. 
Patient noted to have small skin tears on right abdomen, draining serious fluid, covered 
with Optifoam. Chest tube continues on low intermittent suction at this time. Dressing 
remains dry and intact. Oral care provided. Will continue to monitor.

## 2020-09-26 NOTE — NUR
NURSE NOTES:



Joyce Welch NP at bedside. Made aware of today's ABG results. FiO2 was decreased from 80% 
to 70%. No new order at this time.

## 2020-09-26 NOTE — NUR
NURSE NOTES:

Received patient with eyes open. Aox1 opens eyes to name. Orally intubated ETT 7.5, 22cm 
lipline. Vent settings AC 26, , fio2 70%. CARLOS PICC line noted TKO. Lines flush well 
but no blood return noted. Right chest tube connected to Low intermittent suction, Dressing 
dry and intact at this time. Valle cath draining to gravity,. SCD's on. Bilateral Heels and 
Arms elevated at this time. P200 mattress on. Afebrile. No signs of distress noted. Will 
continue to monitor.

## 2020-09-26 NOTE — NUR
NURSE NOTES:



Received patient from Roger CHANEL RN. Orally intubated, ET size of 7.5, 22 cm in the lip, 
mechanical ventilator dependent. Ventilator settings of AC 26, Tidal Volume 500, FiO2 of 
80%, no PEEP. On continuous GTF of Vital running at 50ml/hour. Left upper arm PICC noted. 
Valle cath inplace with yellow clear urine output noted in drainage bag by gravity. Right 
lateral chest tube remains in place, connected to intermittent suction, with serous drainage 
noted in the pleurovac.  Contact isolation observed. Will continue plan of care.

## 2020-09-26 NOTE — CARDIOLOGY PROGRESS NOTE
Assessment/Plan


Assessment/Plan


pneumonia, respiratory distress, on vent and intubated for a long time





Subjective


Subjective


The patient is  lethargic, does not follow commands





Objective





Last 24 Hour Vital Signs








  Date Time  Temp Pulse Resp B/P (MAP) Pulse Ox O2 Delivery O2 Flow Rate FiO2


 


9/26/20 17:00  53 29 110/59 (76) 96   


 


9/26/20 16:00 98.5 72 24 101/78 (86) 95   


 


9/26/20 16:00        70


 


9/26/20 16:00      Mechanical Ventilator  





      Mechanical Ventilator  


 


9/26/20 15:46  64      


 


9/26/20 15:05  62 26     70


 


9/26/20 15:00  55 27 106/93 (97) 95   


 


9/26/20 14:00  56 28 110/73 (85) 98   


 


9/26/20 13:00  45 26 107/83 (91) 95   


 


9/26/20 12:00 99.0 57 28 138/63 (88) 96   


 


9/26/20 12:00      Mechanical Ventilator  





      Mechanical Ventilator  


 


9/26/20 11:52  46      


 


9/26/20 11:05  47 26     70


 


9/26/20 11:00  47 26 120/67 (84) 96   


 


9/26/20 10:00  48 26 110/61 (77) 96   


 


9/26/20 09:00  48 26 149/107 (121) 98   


 


9/26/20 08:25        70


 


9/26/20 08:20        70


 


9/26/20 08:00 98.8 50 26 125/65 (85) 98   


 


9/26/20 08:00  46      


 


9/26/20 08:00      Mechanical Ventilator  





      Mechanical Ventilator  


 


9/26/20 08:00        80


 


9/26/20 07:05  51 26     80


 


9/26/20 07:00  55 26 126/62 (83) 98   


 


9/26/20 06:00  47 26 120/105 (110) 98   


 


9/26/20 05:00  58 26 131/65 (87) 98   


 


9/26/20 04:00      Mechanical Ventilator  





      Mechanical Ventilator  


 


9/26/20 04:00  56      


 


9/26/20 04:00        80


 


9/26/20 04:00 98.6 49 26 107/62 (77) 97   


 


9/26/20 03:37  54 26     80


 


9/26/20 03:00  65 27 120/90 (100) 99   


 


9/26/20 02:00  59 25 126/87 (100) 99   


 


9/26/20 01:00  55 26 144/76 (98) 100   


 


9/26/20 00:00      Mechanical Ventilator  





      Mechanical Ventilator  


 


9/26/20 00:00  57      


 


9/26/20 00:00 98.2 57 26 106/65 (79) 97   


 


9/26/20 00:00        80


 


9/25/20 23:37  61 26     80


 


9/25/20 23:00 98.2 57 26 106/65 (79) 97   


 


9/25/20 22:00  57 26 115/61 (79) 97   


 


9/25/20 21:00  62 26 98/53 (68) 97   


 


9/25/20 20:00        80


 


9/25/20 20:00  64 26 103/71 (82) 96   


 


9/25/20 20:00      Mechanical Ventilator  





      Mechanical Ventilator  


 


9/25/20 20:00  64      


 


9/25/20 19:49  61 26     80


 


9/25/20 19:00  67 27 118/71 (87) 97   


 


9/25/20 18:00  66 20 135/73 (93) 97   








General Appearance:  on vent, other - unresponsive


EENT:  PERRL/EOMI


Neck:  no JVD


Cardiovascular:  regular rhythm, bradycardia


Respiratory/Chest:  rhonchi - bilaterally


Abdomen:  no mass











Intake and Output  


 


 9/25/20 9/26/20





 19:00 07:00


 


Intake Total 700 ml 800 ml


 


Output Total 690 ml 1130 ml


 


Balance 10 ml -330 ml


 


  


 


Free Water  200 ml


 


Tube Feeding 600 ml 600 ml


 


Other 100 ml 


 


Output Urine Total 640 ml 1055 ml


 


Chest Tube Drainage Total 50 ml 75 ml


 


# Bowel Movements 2 3











Laboratory Tests








Test


 9/25/20


21:02 9/25/20


23:57 9/26/20


04:00 9/26/20


07:57


 


POC Whole Blood Glucose


 166 MG/DL


()  H 186 MG/DL


()  H 


 





 


White Blood Count


 


 


 8.8 K/UL


(4.8-10.8) 





 


Red Blood Count


 


 


 2.96 M/UL


(4.20-5.40)  L 





 


Hemoglobin


 


 


 9.5 G/DL


(12.0-16.0)  L 





 


Hematocrit


 


 


 28.0 %


(37.0-47.0)  L 





 


Mean Corpuscular Volume   94 FL (80-99)   


 


Mean Corpuscular Hemoglobin


 


 


 31.9 PG


(27.0-31.0)  H 





 


Mean Corpuscular Hemoglobin


Concent 


 


 33.8 G/DL


(32.0-36.0) 





 


Red Cell Distribution Width


 


 


 16.7 %


(11.6-14.8)  H 





 


Platelet Count


 


 


 86 K/UL


(150-450)  L 





 


Mean Platelet Volume


 


 


 9.4 FL


(6.5-10.1) 





 


Neutrophils (%) (Auto)


 


 


 % (45.0-75.0)


 





 


Lymphocytes (%) (Auto)


 


 


 % (20.0-45.0)


 





 


Monocytes (%) (Auto)    % (1.0-10.0)   


 


Eosinophils (%) (Auto)    % (0.0-3.0)   


 


Basophils (%) (Auto)    % (0.0-2.0)   


 


Differential Total Cells


Counted 


 


 100  


 





 


Neutrophils % (Manual)   88 % (45-75)  H 


 


Lymphocytes % (Manual)   6 % (20-45)  L 


 


Monocytes % (Manual)   6 % (1-10)   


 


Eosinophils % (Manual)   0 % (0-3)   


 


Basophils % (Manual)   0 % (0-2)   


 


Band Neutrophils   0 % (0-8)   


 


Platelet Estimate   Decreased  L 


 


Platelet Morphology   Normal   


 


Hypochromasia   2+   


 


Anisocytosis   1+   


 


Sodium Level


 


 


 148 MMOL/L


(136-145)  H 





 


Potassium Level


 


 


 2.8 MMOL/L


(3.5-5.1)  L 





 


Chloride Level


 


 


 113 MMOL/L


()  H 





 


Carbon Dioxide Level


 


 


 31 MMOL/L


(21-32) 





 


Anion Gap


 


 


 4 mmol/L


(5-15)  L 





 


Blood Urea Nitrogen


 


 


 29 mg/dL


(7-18)  H 





 


Creatinine


 


 


 0.7 MG/DL


(0.55-1.30) 





 


Estimat Glomerular Filtration


Rate 


 


 > 60 mL/min


(>60) 





 


Glucose Level


 


 


 177 MG/DL


()  H 





 


Calcium Level


 


 


 7.2 MG/DL


(8.5-10.1)  L 





 


Phosphorus Level


 


 


 2.0 MG/DL


(2.5-4.9)  L 





 


Magnesium Level


 


 


 1.6 MG/DL


(1.8-2.4)  L 





 


Total Bilirubin


 


 


 0.8 MG/DL


(0.2-1.0) 





 


Aspartate Amino Transf


(AST/SGOT) 


 


 30 U/L (15-37)


 





 


Alanine Aminotransferase


(ALT/SGPT) 


 


 52 U/L (12-78)


 





 


Alkaline Phosphatase


 


 


 54 U/L


() 





 


Total Protein


 


 


 4.0 G/DL


(6.4-8.2)  L 





 


Albumin


 


 


 1.1 G/DL


(3.4-5.0)  L 





 


Globulin   2.9 g/dL   


 


Albumin/Globulin Ratio


 


 


 0.4 (1.0-2.7)


L 





 


Arterial Blood pH


 


 


 


 7.533


(7.350-7.450)


 


Arterial Blood Partial


Pressure CO2 


 


 


 32.2 mmHg


(35.0-45.0)  L


 


Arterial Blood Partial


Pressure O2 


 


 


 87.4 mmHg


(75.0-100.0)


 


Arterial Blood HCO3


 


 


 


 26.5 mmol/L


(22.0-26.0)  H


 


Arterial Blood Oxygen


Saturation 


 


 


 96.1 %


()


 


Arterial Blood Base Excess    4.0 (-2-2)  H


 


Cole Test    Positive  


 


Test


 9/26/20


10:02 9/26/20


13:03 9/26/20


17:09 





 


POC Whole Blood Glucose


 201 MG/DL


()  H 186 MG/DL


()  H 120 MG/DL


()  H 




















Verito Barnett MD            Sep 26, 2020 17:52

## 2020-09-26 NOTE — NUR
NURSE NOTES:



Bedbath provided. Right lateral chest tube remains inplace, on low intermittent suction.

## 2020-09-26 NOTE — NUR
NURSE HAND-OFF REPORT: 



Latest Vital Signs: Temperature 98.5 , Pulse 61 , B/P 109 /67 , Respiratory Rate 26 , O2 SAT 
92 , Mechanical Ventilator, O2 Flow Rate 15.0 .  

Vital Sign Comment: Currently on K Phos drip, first bag out of 2.

Right lateral chest tube connected to low intermittent suction. 

EKG Rhythm: Sinus Rhythm

Rhythm change?: N 



Latest Momin Fall Score: 70  

Fall Risk: High Risk 

Safety Measures: Call light Within Reach, Bed Alarm Zone 1, Side Rails Side Rails x3, Bed 
position Low and Locked.

Fall Precautions: 

Yellow Socks

Door Sign

Patient Fall Education

Contact isolation endorsed.

Report given to Lori SOUSA RN.

## 2020-09-26 NOTE — PULMONOLGY CRITICAL CARE NOTE
Critical Care - Asmt/Plan


Assessment/Plan:


ASSESSMENT


s/p cardiopulmonary arrest


Acute hypoxemic respiratory failure requiring intubation


Sepsis with shock


Persistent CONS bacteremia


Pneumonia FELICE


ARDS


R PTX, s/p CT placement 9/21


DAVID due to ATN   2 to hypotension 


Down syndrome


Hypothyroidism


Seizure disorder 


Anemia, requiring blood transfusion 


DM





PLAN OF CARE


ICU


vent support, pulmonary toilet  


COVID x 3 NGT 


fup  with ABG  , remains on high FiO2 ,  but down to 70 % this am after  

reviewing of  morning ABG


R sided PTX, 


s/p CT placement 9/21, 


monitor output ,  


daily CXR 


off PEEP 


CT surgery  follwos 


Venous duplex BLE   NGT,





on steroids-Hydrocortisone 


off pressors;   


on midodrine 


Echo with pEF 65%  


 


s/p abx  as per ID recs 


COVID-19 x3 NGT


prior persistent CONS bacteremia


Echo no evidence of vegetation 


however BCX 8/29, 9/6, 9/8, 9/19 NGT 


per ID recs keep off abx unless febrile or evidence of infection


 


stool OB +


prior off  Heparin given stool OB +  on  SCD


s/p 3 u PRBC


GI procedures on hold given current condition  


HH stabilized  


GI prophylaxis 





creat worsened due to Vanco , now resolved 


s/p IVF


avoid nephrotoxic  


correct electrolytes as needed


fup with  further nephro recs 


 


continue  levothyroxine , TSH within normal limits 


BS management with LA Levemir and SSI as needed 


seizure precautions,   


supportive care   


 


case discussed and evaluated by supervising physician








Critical Care - Objective





Last 24 Hour Vital Signs








  Date Time  Temp Pulse Resp B/P (MAP) Pulse Ox O2 Delivery O2 Flow Rate FiO2


 


9/26/20 11:00  47 26 120/67 (84) 96   


 


9/26/20 10:00  48 26 110/61 (77) 96   


 


9/26/20 09:00  48 26 149/107 (121) 98   


 


9/26/20 08:25        70


 


9/26/20 08:20        70


 


9/26/20 08:00 98.8 50 26 125/65 (85) 98   


 


9/26/20 08:00  46      


 


9/26/20 08:00      Mechanical Ventilator  





      Mechanical Ventilator  


 


9/26/20 08:00        80


 


9/26/20 07:05  51 26     80


 


9/26/20 07:00  55 26 126/62 (83) 98   


 


9/26/20 06:00  47 26 120/105 (110) 98   


 


9/26/20 05:00  58 26 131/65 (87) 98   


 


9/26/20 04:00      Mechanical Ventilator  





      Mechanical Ventilator  


 


9/26/20 04:00  56      


 


9/26/20 04:00        80


 


9/26/20 04:00 98.6 49 26 107/62 (77) 97   


 


9/26/20 03:37  54 26     80


 


9/26/20 03:00  65 27 120/90 (100) 99   


 


9/26/20 02:00  59 25 126/87 (100) 99   


 


9/26/20 01:00  55 26 144/76 (98) 100   


 


9/26/20 00:00      Mechanical Ventilator  





      Mechanical Ventilator  


 


9/26/20 00:00  57      


 


9/26/20 00:00 98.2 57 26 106/65 (79) 97   


 


9/26/20 00:00        80


 


9/25/20 23:37  61 26     80


 


9/25/20 23:00 98.2 57 26 106/65 (79) 97   


 


9/25/20 22:00  57 26 115/61 (79) 97   


 


9/25/20 21:00  62 26 98/53 (68) 97   


 


9/25/20 20:00        80


 


9/25/20 20:00  64 26 103/71 (82) 96   


 


9/25/20 20:00      Mechanical Ventilator  





      Mechanical Ventilator  


 


9/25/20 20:00  64      


 


9/25/20 19:49  61 26     80


 


9/25/20 19:00  67 27 118/71 (87) 97   


 


9/25/20 18:00  66 20 135/73 (93) 97   


 


9/25/20 17:00  59 25 113/94 (100) 98   


 


9/25/20 16:26  60      


 


9/25/20 16:00      Mechanical Ventilator  





      Mechanical Ventilator  


 


9/25/20 16:00 98.7 63 27 128/65 (86) 96   


 


9/25/20 16:00        80


 


9/25/20 15:22  68 26     80


 


9/25/20 15:00  61 25 123/69 (87) 97   


 


9/25/20 14:00  64 24 119/89 (99) 97   


 


9/25/20 13:00  64 25 130/70 (90) 96   


 


9/25/20 12:10  52      








Decubiti:  stage


Objective:


Status:   sedated, on Vent -26- 70%  no PEEP


Condition:  critical


HEENT:  atraumatic, normocephalic,  face with Down features, OP with ET in 

place, intact


Lungs: decreased BS on the right side, R sided CT to Hemovac with 

serosanguineous drainage  


Abdomen:  soft, non-tender, feeding tube


Decubiti:  +1 edema BLE


Accucheck:  201





Critical Care - Subjective


ROS Limited/Unobtainable:  Yes


Interval Events:


no fevers, no leukocytosis tachypneic 


ABG this am stable, FiO2 down to 70% from 80%, no PEEP 


R sided CT with serosanguineous drainage, 


CXR 9/26 - no definite PTX


low K, Mg and P-  already replaced by nephro


Condition:  critical


IV Access:  PICC - LUE PICC intact


EKG Rhythm:  Sinus Bradycardia


FI02:  70


Vent Support Breath Rate:  26


Vent Support Mode:  AC


Vent Tidal Volume:  500


Sputum Amount:  Moderate


PEEP:  0.0


PIP:  42


Tube Feeding Amount:  50


I&O:











Intake and Output  


 


 9/25/20 9/26/20





 19:00 07:00


 


Intake Total 700 ml 800 ml


 


Output Total 690 ml 1130 ml


 


Balance 10 ml -330 ml


 


  


 


Free Water  200 ml


 


Tube Feeding 600 ml 600 ml


 


Other 100 ml 


 


Output Urine Total 640 ml 1055 ml


 


Chest Tube Drainage Total 50 ml 75 ml


 


# Bowel Movements 1 3








CXR:


9/26 -Endotracheal tube terminates approximately 4.0 cm above the lisandro.  EKG 


leads overlie the patient.


 


Extensive bilateral airspace opacities consistent with multifocal 


infiltrate, similar in appearance to chest radiograph from September 25, 2020.


 


Right-sided chest tube, unchanged.  No definite pneumothorax.


 


Cardiomegaly.


ET-Tube:  7.5


ET Position:  22











Joyce Welch NP                Sep 26, 2020 12:11

## 2020-09-26 NOTE — INTERNAL MED PROGRESS NOTE
Subjective


Date of Service:  Sep 26, 2020


Physician Name


Sanya Schultz


Attending Physician


Chano Cherry MD





Current Medications








 Medications


  (Trade)  Dose


 Ordered  Sig/Didi


 Route


 PRN Reason  Start Time


 Stop Time Status Last Admin


Dose Admin


 


 Acetaminophen


  (Tylenol)  650 mg  Q4H  PRN


 GT


 For Pain  9/23/20 17:15


 10/23/20 17:14  9/23/20 17:23





 


 Chlorhexidine


 Gluconate


  (Beatrice-Hex 2%)  1 applic  DAILY@2000


 TOPIC


   8/31/20 20:00


 11/29/20 19:59  9/25/20 20:57





 


 Clotrimazole


  (Lotrimin)  1 applic  Q12HR


 TOPIC


   8/30/20 13:00


 11/28/20 12:59  9/26/20 08:48





 


 Dextrose


  (Dextrose 50%)  25 ml  Q30M  PRN


 IV


 Hypoglycemia  8/26/20 11:30


 11/20/20 11:29   





 


 Dextrose


  (Dextrose 50%)  50 ml  Q30M  PRN


 IV


 Hypoglycemia  8/26/20 11:30


 11/20/20 11:29   





 


 Hydrocortisone


  (Solu-CORTEF)  50 mg  EVERY 8  HOURS


 IV


   9/25/20 22:00


 11/28/20 13:59  9/26/20 13:12





 


 Insulin Aspart


  (NovoLOG)    Q6HR


 SUBQ


   9/18/20 12:00


 12/17/20 11:59  9/26/20 13:13





 


 Insulin Detemir


  (Levemir)  10 units  Q12HR


 SUBQ


   9/18/20 10:00


 12/17/20 09:59  9/26/20 10:04





 


 Loperamide HCl


  (Imodium)  2 mg  Q6H  PRN


 NG


 Diarrhea  9/16/20 10:30


 10/16/20 10:29  9/23/20 11:41





 


 Midodrine


  (Pro-Amatine)  10 mg  Q8HR


 GT


   8/28/20 14:00


 11/26/20 13:59  9/26/20 13:12





 


 Potassium


 Phosphate  250 ml @ 


 62.5 mls/hr  Q4H


 IVPB


   9/26/20 16:30


 9/27/20 00:29   





 


 Potassium Chloride  100 ml @ 


 50 mls/hr  Q2H


 IVPB


   9/26/20 10:00


 9/26/20 13:59  9/26/20 12:24





 


 Potassium Chloride


  (K-Dur)  40 meq  TWICE A  DAY


 GT


   9/20/20 12:15


 12/19/20 12:14  9/26/20 09:31











Allergies:  


Coded Allergies:  


     No Known Allergies (Unverified , 1/8/19)


ROS Limited/Unobtainable:  Yes


Subjective


60 YO F with Down's syndrome admitted with hypoxia.  Now sepsis and pneumonia.  

Cover for Int Phi-DR Hung.  ICU.  Intubated and sedated





Objective





Last Vital Signs








  Date Time  Temp Pulse Resp B/P (MAP) Pulse Ox O2 Delivery O2 Flow Rate FiO2


 


9/26/20 13:00  45 26 107/83 (91) 95   


 


9/26/20 12:00      Mechanical Ventilator  





      Mechanical Ventilator  


 


9/26/20 08:25        70


 


9/26/20 08:00 98.8       











Laboratory Tests








Test


 9/25/20


21:02 9/25/20


23:57 9/26/20


04:00 9/26/20


07:57


 


POC Whole Blood Glucose


 166 MG/DL


()  H 186 MG/DL


()  H 


 





 


White Blood Count


 


 


 8.8 K/UL


(4.8-10.8) 





 


Red Blood Count


 


 


 2.96 M/UL


(4.20-5.40)  L 





 


Hemoglobin


 


 


 9.5 G/DL


(12.0-16.0)  L 





 


Hematocrit


 


 


 28.0 %


(37.0-47.0)  L 





 


Mean Corpuscular Volume   94 FL (80-99)   


 


Mean Corpuscular Hemoglobin


 


 


 31.9 PG


(27.0-31.0)  H 





 


Mean Corpuscular Hemoglobin


Concent 


 


 33.8 G/DL


(32.0-36.0) 





 


Red Cell Distribution Width


 


 


 16.7 %


(11.6-14.8)  H 





 


Platelet Count


 


 


 86 K/UL


(150-450)  L 





 


Mean Platelet Volume


 


 


 9.4 FL


(6.5-10.1) 





 


Neutrophils (%) (Auto)


 


 


 % (45.0-75.0)


 





 


Lymphocytes (%) (Auto)


 


 


 % (20.0-45.0)


 





 


Monocytes (%) (Auto)    % (1.0-10.0)   


 


Eosinophils (%) (Auto)    % (0.0-3.0)   


 


Basophils (%) (Auto)    % (0.0-2.0)   


 


Differential Total Cells


Counted 


 


 100  


 





 


Neutrophils % (Manual)   88 % (45-75)  H 


 


Lymphocytes % (Manual)   6 % (20-45)  L 


 


Monocytes % (Manual)   6 % (1-10)   


 


Eosinophils % (Manual)   0 % (0-3)   


 


Basophils % (Manual)   0 % (0-2)   


 


Band Neutrophils   0 % (0-8)   


 


Platelet Estimate   Decreased  L 


 


Platelet Morphology   Normal   


 


Hypochromasia   2+   


 


Anisocytosis   1+   


 


Sodium Level


 


 


 148 MMOL/L


(136-145)  H 





 


Potassium Level


 


 


 2.8 MMOL/L


(3.5-5.1)  L 





 


Chloride Level


 


 


 113 MMOL/L


()  H 





 


Carbon Dioxide Level


 


 


 31 MMOL/L


(21-32) 





 


Anion Gap


 


 


 4 mmol/L


(5-15)  L 





 


Blood Urea Nitrogen


 


 


 29 mg/dL


(7-18)  H 





 


Creatinine


 


 


 0.7 MG/DL


(0.55-1.30) 





 


Estimat Glomerular Filtration


Rate 


 


 > 60 mL/min


(>60) 





 


Glucose Level


 


 


 177 MG/DL


()  H 





 


Calcium Level


 


 


 7.2 MG/DL


(8.5-10.1)  L 





 


Phosphorus Level


 


 


 2.0 MG/DL


(2.5-4.9)  L 





 


Magnesium Level


 


 


 1.6 MG/DL


(1.8-2.4)  L 





 


Total Bilirubin


 


 


 0.8 MG/DL


(0.2-1.0) 





 


Aspartate Amino Transf


(AST/SGOT) 


 


 30 U/L (15-37)


 





 


Alanine Aminotransferase


(ALT/SGPT) 


 


 52 U/L (12-78)


 





 


Alkaline Phosphatase


 


 


 54 U/L


() 





 


Total Protein


 


 


 4.0 G/DL


(6.4-8.2)  L 





 


Albumin


 


 


 1.1 G/DL


(3.4-5.0)  L 





 


Globulin   2.9 g/dL   


 


Albumin/Globulin Ratio


 


 


 0.4 (1.0-2.7)


L 





 


Arterial Blood pH


 


 


 


 7.533


(7.350-7.450)


 


Arterial Blood Partial


Pressure CO2 


 


 


 32.2 mmHg


(35.0-45.0)  L


 


Arterial Blood Partial


Pressure O2 


 


 


 87.4 mmHg


(75.0-100.0)


 


Arterial Blood HCO3


 


 


 


 26.5 mmol/L


(22.0-26.0)  H


 


Arterial Blood Oxygen


Saturation 


 


 


 96.1 %


()


 


Arterial Blood Base Excess    4.0 (-2-2)  H


 


Cole Test    Positive  

















Intake and Output  


 


 9/25/20 9/26/20





 19:00 07:00


 


Intake Total 700 ml 800 ml


 


Output Total 690 ml 1130 ml


 


Balance 10 ml -330 ml


 


  


 


Free Water  200 ml


 


Tube Feeding 600 ml 600 ml


 


Other 100 ml 


 


Output Urine Total 640 ml 1055 ml


 


Chest Tube Drainage Total 50 ml 75 ml


 


# Bowel Movements 2 3








Objective


General Appearance:  WD/WN, no apparent distress, alert


EENT:  PERRL/EOMI, normal ENT inspection


Neck:  non-tender, normal alignment, supple, normal inspection


Cardiovascular:  normal peripheral pulses, normal rate, regular rhythm, no 

gallop/murmur, no JVD


Respiratory/Chest:  Mech vent; decreased breath sounds, crackles/rales, rhonchi 

- bilaterally, expiratory wheezing


Abdomen:  normal bowel sounds, non tender, soft, no organomegaly, no mass


Extremities:  normal range of motion


Neurologic:  CNs II-XII grossly normal


Skin:  normal pigmentation, warm/dry





Assessment/Plan


Problem List:  


(1) HCAP (healthcare-associated pneumonia)


Assessment & Plan:  Strep Group G.  Continue amikacin per ID=Dr Gomes.  

Pulmonary/Critical care=DR Cherry.  COVID NEG





(2) Sepsis


Assessment & Plan:  Staph haemolyticus.  Continue amikacin per ID=Dr Gomes





(3) Down's syndrome


(4) Dysphagia


Assessment & Plan:  S/P PEG





(5) Seizure disorder


Assessment & Plan:  Continue keppra and depakote





(6) Hypothyroidism


Assessment & Plan:  Continue synthroid





(7) Acute respiratory failure


Assessment & Plan:  Pulmonary = Dr Cherry; [facility] vent














Sanya Schultz MD               Sep 26, 2020 13:20

## 2020-09-27 VITALS — DIASTOLIC BLOOD PRESSURE: 66 MMHG | SYSTOLIC BLOOD PRESSURE: 133 MMHG

## 2020-09-27 VITALS — DIASTOLIC BLOOD PRESSURE: 90 MMHG | SYSTOLIC BLOOD PRESSURE: 123 MMHG

## 2020-09-27 VITALS — SYSTOLIC BLOOD PRESSURE: 121 MMHG | DIASTOLIC BLOOD PRESSURE: 58 MMHG

## 2020-09-27 VITALS — DIASTOLIC BLOOD PRESSURE: 68 MMHG | SYSTOLIC BLOOD PRESSURE: 144 MMHG

## 2020-09-27 VITALS — DIASTOLIC BLOOD PRESSURE: 79 MMHG | SYSTOLIC BLOOD PRESSURE: 146 MMHG

## 2020-09-27 VITALS — DIASTOLIC BLOOD PRESSURE: 62 MMHG | SYSTOLIC BLOOD PRESSURE: 131 MMHG

## 2020-09-27 VITALS — DIASTOLIC BLOOD PRESSURE: 68 MMHG | SYSTOLIC BLOOD PRESSURE: 132 MMHG

## 2020-09-27 VITALS — SYSTOLIC BLOOD PRESSURE: 137 MMHG | DIASTOLIC BLOOD PRESSURE: 68 MMHG

## 2020-09-27 VITALS — DIASTOLIC BLOOD PRESSURE: 80 MMHG | SYSTOLIC BLOOD PRESSURE: 115 MMHG

## 2020-09-27 VITALS — DIASTOLIC BLOOD PRESSURE: 64 MMHG | SYSTOLIC BLOOD PRESSURE: 105 MMHG

## 2020-09-27 VITALS — DIASTOLIC BLOOD PRESSURE: 67 MMHG | SYSTOLIC BLOOD PRESSURE: 118 MMHG

## 2020-09-27 VITALS — SYSTOLIC BLOOD PRESSURE: 127 MMHG | DIASTOLIC BLOOD PRESSURE: 86 MMHG

## 2020-09-27 VITALS — DIASTOLIC BLOOD PRESSURE: 55 MMHG | SYSTOLIC BLOOD PRESSURE: 89 MMHG

## 2020-09-27 VITALS — SYSTOLIC BLOOD PRESSURE: 147 MMHG | DIASTOLIC BLOOD PRESSURE: 68 MMHG

## 2020-09-27 VITALS — DIASTOLIC BLOOD PRESSURE: 75 MMHG | SYSTOLIC BLOOD PRESSURE: 132 MMHG

## 2020-09-27 VITALS — DIASTOLIC BLOOD PRESSURE: 95 MMHG | SYSTOLIC BLOOD PRESSURE: 124 MMHG

## 2020-09-27 VITALS — SYSTOLIC BLOOD PRESSURE: 119 MMHG | DIASTOLIC BLOOD PRESSURE: 68 MMHG

## 2020-09-27 VITALS — DIASTOLIC BLOOD PRESSURE: 95 MMHG | SYSTOLIC BLOOD PRESSURE: 147 MMHG

## 2020-09-27 VITALS — SYSTOLIC BLOOD PRESSURE: 116 MMHG | DIASTOLIC BLOOD PRESSURE: 64 MMHG

## 2020-09-27 VITALS — SYSTOLIC BLOOD PRESSURE: 98 MMHG | DIASTOLIC BLOOD PRESSURE: 58 MMHG

## 2020-09-27 VITALS — DIASTOLIC BLOOD PRESSURE: 61 MMHG | SYSTOLIC BLOOD PRESSURE: 103 MMHG

## 2020-09-27 VITALS — SYSTOLIC BLOOD PRESSURE: 153 MMHG | DIASTOLIC BLOOD PRESSURE: 82 MMHG

## 2020-09-27 LAB
ADD MANUAL DIFF: YES
ALBUMIN SERPL-MCNC: 1.2 G/DL (ref 3.4–5)
ALBUMIN/GLOB SERPL: 0.4 {RATIO} (ref 1–2.7)
ALP SERPL-CCNC: 54 U/L (ref 46–116)
ALT SERPL-CCNC: 45 U/L (ref 12–78)
ANION GAP SERPL CALC-SCNC: 4 MMOL/L (ref 5–15)
AST SERPL-CCNC: 23 U/L (ref 15–37)
BILIRUB SERPL-MCNC: 0.7 MG/DL (ref 0.2–1)
BUN SERPL-MCNC: 25 MG/DL (ref 7–18)
CALCIUM SERPL-MCNC: 7.2 MG/DL (ref 8.5–10.1)
CHLORIDE SERPL-SCNC: 113 MMOL/L (ref 98–107)
CO2 SERPL-SCNC: 30 MMOL/L (ref 21–32)
CREAT SERPL-MCNC: 0.6 MG/DL (ref 0.55–1.3)
ERYTHROCYTE [DISTWIDTH] IN BLOOD BY AUTOMATED COUNT: 16.6 % (ref 11.6–14.8)
GLOBULIN SER-MCNC: 3.1 G/DL
HCT VFR BLD CALC: 28.3 % (ref 37–47)
HGB BLD-MCNC: 9.4 G/DL (ref 12–16)
MCV RBC AUTO: 94 FL (ref 80–99)
PHOSPHATE SERPL-MCNC: 3.3 MG/DL (ref 2.5–4.9)
PLATELET # BLD: 89 K/UL (ref 150–450)
POTASSIUM SERPL-SCNC: 3.7 MMOL/L (ref 3.5–5.1)
RBC # BLD AUTO: 3 M/UL (ref 4.2–5.4)
SODIUM SERPL-SCNC: 147 MMOL/L (ref 136–145)
WBC # BLD AUTO: 6.7 K/UL (ref 4.8–10.8)

## 2020-09-27 RX ADMIN — MIDODRINE HYDROCHLORIDE SCH MG: 10 TABLET ORAL at 13:07

## 2020-09-27 RX ADMIN — MIDODRINE HYDROCHLORIDE SCH MG: 10 TABLET ORAL at 05:18

## 2020-09-27 RX ADMIN — HYDROCORTISONE SODIUM SUCCINATE SCH MG: 100 INJECTION, POWDER, FOR SOLUTION INTRAMUSCULAR; INTRAVENOUS at 13:07

## 2020-09-27 RX ADMIN — HYDROCORTISONE SODIUM SUCCINATE SCH MG: 100 INJECTION, POWDER, FOR SOLUTION INTRAMUSCULAR; INTRAVENOUS at 22:11

## 2020-09-27 RX ADMIN — MIDODRINE HYDROCHLORIDE SCH MG: 10 TABLET ORAL at 22:00

## 2020-09-27 RX ADMIN — HYDROCORTISONE SODIUM SUCCINATE SCH MG: 100 INJECTION, POWDER, FOR SOLUTION INTRAMUSCULAR; INTRAVENOUS at 22:10

## 2020-09-27 RX ADMIN — CHLORHEXIDINE GLUCONATE SCH APPLIC: 213 SOLUTION TOPICAL at 20:42

## 2020-09-27 RX ADMIN — INSULIN ASPART SCH UNITS: 100 INJECTION, SOLUTION INTRAVENOUS; SUBCUTANEOUS at 05:19

## 2020-09-27 RX ADMIN — INSULIN ASPART SCH UNITS: 100 INJECTION, SOLUTION INTRAVENOUS; SUBCUTANEOUS at 17:56

## 2020-09-27 RX ADMIN — HYDROCORTISONE SODIUM SUCCINATE SCH MG: 100 INJECTION, POWDER, FOR SOLUTION INTRAMUSCULAR; INTRAVENOUS at 05:18

## 2020-09-27 RX ADMIN — INSULIN DETEMIR SCH UNITS: 100 INJECTION, SOLUTION SUBCUTANEOUS at 20:44

## 2020-09-27 RX ADMIN — INSULIN ASPART SCH UNITS: 100 INJECTION, SOLUTION INTRAVENOUS; SUBCUTANEOUS at 13:00

## 2020-09-27 RX ADMIN — INSULIN DETEMIR SCH UNITS: 100 INJECTION, SOLUTION SUBCUTANEOUS at 09:33

## 2020-09-27 NOTE — NUR
NURSE NOTES:

Pt resting in bed awake,stable no resp distress presented,RT Chest tube in placed and 
patent.

## 2020-09-27 NOTE — NEPHROLOGY PROGRESS NOTE
Assessment/Plan


Problem List:  


(1) DAVID (acute kidney injury)


(2) Respiratory failure requiring intubation


(3) Down's syndrome


(4) Seizure disorder


(5) Hypothyroidism


Assessment





Acute renal failure, likely due to hypotension


Acute respiratory distress, hypoxia


Seizure disorder


Hypothyroidism


Down syndrome


Full code











Fluid challenge with IV fluids and albumin


Midodrine for BP above 100 systolic


Check TSH level


Check


Correct level


Monitor renal parameters


Urine studies


Per orders


Plan


September 27: Remains intubated.  Full code.  No plan for tracheostomy yet.  

Labs reviewed.  All acceptable.  Continue same management


September 26: Labs reviewed.  Discussed with RN.  Potassium and phosphorus and 

magnesium replacement ordered.  Hemoglobin 9.5.  Patient remains full code.  

Continue per consultants.


September 25: Lab reviewed.  Renal parameters stable.  Full code.  Intubated.  

Electrolyte abnormalities addressed and replacement done.


September 24: Lab reviewed.  Renal parameters stable.  Full code.  Intubated.  

Has right side chest tube.  Continue per consultants.


September 23: Lab reviewed.  Renal parameters stable.  Full code.  Has right 

chest tube.  Planning process for tracheostomy.  Defer to chest and general 

surgeon.


September 22: Labs reviewed.  Renal parameters stable.  Remains full code.  

Remains on ventilator.  Due for tracheostomy tomorrow.  Continue per 

consultants.  Patient has a right chest tube in place at this time.


September 21: Labs reviewed.  Electrolyte imbalances addressed and supplemented.

 Remains full code and on ventilator.  Continue to monitor renal parameters.  

Continue per consultants.


September 20: Labs reviewed.  Serum potassium again low today.  Potassium supp

lement IV and through GT given.  Patient remains full code and is on ventilator.

 Continue per consultants.  Continue to monitor electrolytes and renal 

parameters.


September 19: Labs reviewed.  Abnormal electrolyte addressed.  Remains full 

code.  Remains vented.


September 18: Day 27 of hospitalization.  Full code.  Labs reviewed.  Hemoglobin

down to 7.5.  Electrolyte abnormalities addressed and corrections ordered.  

Continue to monitor renal parameters.  Continue per consultants.  Start on 

Levemir for blood sugar management.  Questioning continuation of hydrocortisone?


September 17: Labs reviewed.  Potassium, phosphorus, hemoglobin, are all low.  

Potassium and phosphorus IV replacement given.  Continue to monitor electrolytes

and CBC.  Patient remains full code.


September 16: Lab reviewed.  Low phosphorus low magnesium and low potassium was 

addressed.  Hemoglobin drifting lower.  Continue per consultants.  Patient 

remains full code.


September 15: Labs reviewed.  Patient continues to be on ventilator.  D5W for 

high sodium and also potassium chloride intravenously as supplement given.  

Hemoglobin 8.4.  Continue to monitor electrolytes and renal parameters.


September 14: Labs reviewed.  Low potassium and high sodium noted.  Hemoglobin 

8.1 stable.  Aim to correct abnormal electrolyte.  Continue rest.  Will give 2 

boluses of D5W 500 cc.


September 13: Lab reviewed.  Abnormal electrolytes noted and addressed.


September 12: Labs reviewed.  Potassium supplement given.  Patient remains full 

code.  Continue per consultants.


September 11: Lab reviewed.  Electrolyte abnormalities addressed.  Continue per 

pulmonary and ID.


September 10: Lab reviewed.  Status unchanged.  Serum sodium 151 unchanged.  

Stable from renal standpoint of view.


September 9: Labs reviewed.  Status quo.  D5W 500 cc IV ordered.  Continue to 

monitor renal parameters.


September 8: Status quo.  Labs reviewed.  Overall condition unchanged.  Patient 

was transfused and hemoglobin higher.  Continue current management.  Patient 

remains full code.


September 7: Status quo.  Overall condition poor.  Very low albumin.  Edematous.

 Hypotensive.  Hemoglobin lower.  Anemia work-up ordered.  I favor transfusion 2

units of packed RBCs.  Patient remains full code.  I favor supportive care only.

 Will discuss.


September 6: Electrolyte abnormalities addressed.  Serum creatinine lower.  

Continue per current management.


September 5: Status unchanged.  Lab reviewed.  Serum potassium 2.7.  IV 

potassium chloride ordered.  Serum creatinine low at 1.6 stable.  Blood pressure

90s systolic


September 4: Status quo.  Labs reviewed.  Renal parameters stable.  Serum 

creatinine down to 1.6.  Medication list reviewed.  Continues to be on 

midodrine.  Continue per consultants.


September 3: Status quo.  Labs reviewed.  Electrolytes adjusted.  Serum 

creatinine down to 1.8.  Continue per consultants.


September 2: Status quo.  Labs reviewed.  Phosphorus supplement IV given.  Serum

creatinine 2.  Continue per consultants.


September 1: Requires less pressors.  Albumin bolus given.  1 dose of Lasix IV 

ordered as the patient severely edematous.  Patient serum albumin is very low.  

Continue per consultants.


August 31: Continues to be intubated.  Labs reviewed.  Serum creatinine 1.9 

unchanged.  Blood pressure more stable.  Off 1 of the pressors.  Continue to 

monitor renal parameters.  Continue per consultants.  Patient now on 

hydrocortisone 100 mg every 8 hours.  Will decrease IV fluid.  Normal saline 

down to 50 cc an hour.


August 30: Intubated.  Labs reviewed.  Creatinine 1.9 unchanged.  Continue same 

treatment plan.  Per consultants.  Overall poor prognosis since the patient 

remains on pressors and her pulmonary status is worsening.


August 29: Remains intubated.  Labs reviewed.  Creatinine 1.9.  Blood pressure 

systolic 90s.  Continue per consultants.


August 28: Remains intubated.  Labs reviewed.  Serum creatinine lower to 2.  

Vancomycin level lower.  Remains hypotensive on pressors.  Will increase 

midodrine to 10 mg every 8 hours.  Continue per consultants.  Continue to 

monitor renal parameters.


August 27: Patient now in ICU.  Intubated.  On pressors.  Labs reviewed.  Will 

increase midodrine.  Aim to keep blood pressure over 100 systolic.  Will give 

albumin bolus.  Will check vancomycin level which was elevated when checked 

previously on August 24.  Will monitor renal parameters.  Continue per 

consultants.





Subjective


ROS Limited/Unobtainable:  Yes





Objective


Objective





Last 24 Hour Vital Signs








  Date Time  Temp Pulse Resp B/P (MAP) Pulse Ox O2 Delivery O2 Flow Rate FiO2


 


9/27/20 08:00      Mechanical Ventilator  





      Mechanical Ventilator  





      Mechanical Ventilator  


 


9/27/20 08:00 98.7 52 26 133/66 (88) 95   


 


9/27/20 08:00  54      


 


9/27/20 08:00        70


 


9/27/20 06:00  55 26 131/62 (85) 96   


 


9/27/20 05:00  57 25 103/61 (75) 97   


 


9/27/20 04:00        70


 


9/27/20 04:00      Mechanical Ventilator  





      Mechanical Ventilator  





      Mechanical Ventilator  


 


9/27/20 04:00  63      


 


9/27/20 04:00 98.4 62 30 98/58 (71) 96   


 


9/27/20 03:07  60 26     60


 


9/27/20 03:00  62 23 115/80 (92) 97   


 


9/27/20 02:00  56 20 132/68 (89) 96   


 


9/27/20 01:00  54 20 127/86 (100) 96   


 


9/27/20 00:00        70


 


9/27/20 00:00      Mechanical Ventilator  





      Mechanical Ventilator  





      Mechanical Ventilator  


 


9/27/20 00:00  55      


 


9/27/20 00:00 98.6 54 23 116/64 (81) 97   


 


9/26/20 23:00  71 20 111/95 (100) 68   


 


9/26/20 22:51  58 26     70


 


9/26/20 22:00  64 30 103/79 (87) 93   


 


9/26/20 21:00  64 27 108/86 (93) 100   


 


9/26/20 20:00 98.7 64 26 107/63 (78) 95   


 


9/26/20 20:00        70


 


9/26/20 20:00      Mechanical Ventilator  





      Mechanical Ventilator  





      Mechanical Ventilator  





      Mechanical Ventilator  


 


9/26/20 20:00  62      


 


9/26/20 19:18  61 26     70


 


9/26/20 19:00  60 26 109/67 (81) 92   


 


9/26/20 18:00  55 26 98/64 (75) 96   


 


9/26/20 17:00  53 29 110/59 (76) 96   


 


9/26/20 16:00 98.5 72 24 101/78 (86) 95   


 


9/26/20 16:00        70


 


9/26/20 16:00      Mechanical Ventilator  





      Mechanical Ventilator  


 


9/26/20 15:46  64      


 


9/26/20 15:05  62 26     70


 


9/26/20 15:00  55 27 106/93 (97) 95   


 


9/26/20 14:00  56 28 110/73 (85) 98   


 


9/26/20 13:00  45 26 107/83 (91) 95   


 


9/26/20 12:00 99.0 57 28 138/63 (88) 96   


 


9/26/20 12:00      Mechanical Ventilator  





      Mechanical Ventilator  


 


9/26/20 11:52  46      


 


9/26/20 11:05  47 26     70


 


9/26/20 11:00  47 26 120/67 (84) 96   


 


9/26/20 10:00  48 26 110/61 (77) 96   


 


9/26/20 09:00  48 26 149/107 (121) 98   

















Intake and Output  


 


 9/26/20 9/27/20





 19:00 07:00


 


Intake Total 1385.0 ml 1100.0 ml


 


Output Total 685 ml 1115 ml


 


Balance 700.0 ml -15.0 ml


 


  


 


IV Total 725.0 ml 250.0 ml


 


Tube Feeding 600 ml 550 ml


 


Other 60 ml 300 ml


 


Output Urine Total 645 ml 1075 ml


 


Chest Tube Drainage Total 40 ml 40 ml


 


# Bowel Movements  2








Laboratory Tests


9/26/20 10:02: POC Whole Blood Glucose 201H


9/26/20 13:03: POC Whole Blood Glucose 186H


9/26/20 17:09: POC Whole Blood Glucose 120H


9/26/20 20:29: POC Whole Blood Glucose [Pending]


9/26/20 23:32: POC Whole Blood Glucose 141H


9/27/20 03:30: 


White Blood Count 6.7, Red Blood Count 3.00L, Hemoglobin 9.4L, Hematocrit 28.3L,

 Mean Corpuscular Volume 94, Mean Corpuscular Hemoglobin 31.4H, Mean Corpuscular

 Hemoglobin Concent 33.3, Red Cell Distribution Width 16.6H, Platelet Count 89L,

 Mean Platelet Volume 9.3, Neutrophils (%) (Auto) , Lymphocytes (%) (Auto) , 

Monocytes (%) (Auto) , Eosinophils (%) (Auto) , Basophils (%) (Auto) , 

Differential Total Cells Counted 100, Neutrophils % (Manual) 87H, Lymphocytes % 

(Manual) 9L, Monocytes % (Manual) 4, Eosinophils % (Manual) 0, Basophils % 

(Manual) 0, Band Neutrophils 0, Platelet Estimate DecreasedL, Platelet 

Morphology Normal, Hypochromasia 2+, Anisocytosis 1+, Spherocytes 1+, Sodium 

Level 147H, Potassium Level 3.7, Chloride Level 113H, Carbon Dioxide Level 30, 

Anion Gap 4L, Blood Urea Nitrogen 25H, Creatinine 0.6, Estimat Glomerular 

Filtration Rate > 60, Glucose Level 172H, Calcium Level 7.2L, Phosphorus Level 

3.3, Magnesium Level 2.0, Total Bilirubin 0.7, Aspartate Amino Transf (AST/SGOT)

 23, Alanine Aminotransferase (ALT/SGPT) 45, Alkaline Phosphatase 54, Total 

Protein 4.3L, Albumin 1.2L, Globulin 3.1, Albumin/Globulin Ratio 0.4L


9/27/20 05:12: POC Whole Blood Glucose 180H


9/27/20 07:52: 


Arterial Blood pH 7.514H, Arterial Blood Partial Pressure CO2 31.1L, Arterial 

Blood Partial Pressure O2 82.0, Arterial Blood HCO3 24.5, Arterial Blood Oxygen 

Saturation 95.5, Arterial Blood Base Excess 1.9, Cole Test Positive


Height (Feet):  5


Height (Inches):  3.00


Weight (Pounds):  172


General Appearance:  no apparent distress


EENT:  other - Remains intubated ventilator


Cardiovascular:  bradycardia


Respiratory/Chest:  decreased breath sounds


Abdomen:  soft











Lam Nino MD            Sep 27, 2020 09:01

## 2020-09-27 NOTE — NUR
NURSE NOTES:

received report from tammi mccurdy pt orally intubated -vent o2 sat 100% [id number] sb-sr  
[id number] tube feeding chest tube -lilliam vac at inter-suction with minimal drinnage 
reposition and suction

## 2020-09-27 NOTE — CARDIOLOGY PROGRESS NOTE
Assessment/Plan


Assessment/Plan


pneumonia, respiratory distress, on vent and intubated for a long time





Subjective


Subjective


The patient is intubated


she is looking arounds but does not communicate





Objective





Last 24 Hour Vital Signs








  Date Time  Temp Pulse Resp B/P (MAP) Pulse Ox O2 Delivery O2 Flow Rate FiO2


 


9/27/20 19:08  47 24     70


 


9/27/20 19:00  50 18 105/64 (78) 95   


 


9/27/20 18:00  47 18 105/64 (78) 95   


 


9/27/20 17:00 98.3 45 18 144/68 (93) 94   


 


9/27/20 16:00  45      


 


9/27/20 16:00  45 22 137/68 (91) 95   


 


9/27/20 16:00      Mechanical Ventilator  





      Mechanical Ventilator  





      Mechanical Ventilator  


 


9/27/20 16:00        70


 


9/27/20 15:05  47 24     60


 


9/27/20 15:00  41 15 153/82 (105) 95   


 


9/27/20 14:00  46 23 146/79 (101) 97   


 


9/27/20 13:00  53 16 147/95 (112) 94   


 


9/27/20 12:00        70


 


9/27/20 12:00      Mechanical Ventilator  





      Mechanical Ventilator  





      Mechanical Ventilator  


 


9/27/20 12:00  50 21 132/75 (94) 96   


 


9/27/20 12:00 98.5       


 


9/27/20 12:00  49      


 


9/27/20 11:05  67 25     60


 


9/27/20 11:00  46 21 119/68 (85) 95   


 


9/27/20 10:00  53 24 118/67 (84) 94   


 


9/27/20 09:00  57 21 124/95 (105) 97   


 


9/27/20 08:00      Mechanical Ventilator  





      Mechanical Ventilator  





      Mechanical Ventilator  


 


9/27/20 08:00 98.7 52 26 133/66 (88) 95   


 


9/27/20 08:00  54      


 


9/27/20 08:00        70


 


9/27/20 07:05  63 26     60


 


9/27/20 06:00  55 26 131/62 (85) 96   


 


9/27/20 05:00  57 25 103/61 (75) 97   


 


9/27/20 04:00        70


 


9/27/20 04:00      Mechanical Ventilator  





      Mechanical Ventilator  





      Mechanical Ventilator  


 


9/27/20 04:00  63      


 


9/27/20 04:00 98.4 62 30 98/58 (71) 96   


 


9/27/20 03:07  60 26     60


 


9/27/20 03:00  62 23 115/80 (92) 97   


 


9/27/20 02:00  56 20 132/68 (89) 96   


 


9/27/20 01:00  54 20 127/86 (100) 96   


 


9/27/20 00:00        70


 


9/27/20 00:00      Mechanical Ventilator  





      Mechanical Ventilator  





      Mechanical Ventilator  


 


9/27/20 00:00  55      


 


9/27/20 00:00 98.6 54 23 116/64 (81) 97   


 


9/26/20 23:00  71 20 111/95 (100) 68   


 


9/26/20 22:51  58 26     70


 


9/26/20 22:00  64 30 103/79 (87) 93   








General Appearance:  on vent -  no communication


EENT:  PERRL/EOMI


Neck:  supple


Rhythm:  SB


Cardiovascular:  bradycardia


Respiratory/Chest:  crackles/rales


Abdomen:  soft


Extremities:  pitting











Intake and Output  


 


 9/26/20 9/27/20





 19:00 07:00


 


Intake Total 1385.0 ml 1100.0 ml


 


Output Total 685 ml 1115 ml


 


Balance 700.0 ml -15.0 ml


 


  


 


IV Total 725.0 ml 250.0 ml


 


Tube Feeding 600 ml 550 ml


 


Other 60 ml 300 ml


 


Output Urine Total 645 ml 1075 ml


 


Chest Tube Drainage Total 40 ml 40 ml


 


# Bowel Movements  2











Laboratory Tests








Test


 9/26/20


23:32 9/27/20


03:30 9/27/20


05:12 9/27/20


07:52


 


POC Whole Blood Glucose


 141 MG/DL


()  H 


 180 MG/DL


()  H 





 


White Blood Count


 


 6.7 K/UL


(4.8-10.8) 


 





 


Red Blood Count


 


 3.00 M/UL


(4.20-5.40)  L 


 





 


Hemoglobin


 


 9.4 G/DL


(12.0-16.0)  L 


 





 


Hematocrit


 


 28.3 %


(37.0-47.0)  L 


 





 


Mean Corpuscular Volume  94 FL (80-99)    


 


Mean Corpuscular Hemoglobin


 


 31.4 PG


(27.0-31.0)  H 


 





 


Mean Corpuscular Hemoglobin


Concent 


 33.3 G/DL


(32.0-36.0) 


 





 


Red Cell Distribution Width


 


 16.6 %


(11.6-14.8)  H 


 





 


Platelet Count


 


 89 K/UL


(150-450)  L 


 





 


Mean Platelet Volume


 


 9.3 FL


(6.5-10.1) 


 





 


Neutrophils (%) (Auto)


 


 % (45.0-75.0)


 


 





 


Lymphocytes (%) (Auto)


 


 % (20.0-45.0)


 


 





 


Monocytes (%) (Auto)   % (1.0-10.0)    


 


Eosinophils (%) (Auto)   % (0.0-3.0)    


 


Basophils (%) (Auto)   % (0.0-2.0)    


 


Differential Total Cells


Counted 


 100  


 


 





 


Neutrophils % (Manual)  87 % (45-75)  H  


 


Lymphocytes % (Manual)  9 % (20-45)  L  


 


Monocytes % (Manual)  4 % (1-10)    


 


Eosinophils % (Manual)  0 % (0-3)    


 


Basophils % (Manual)  0 % (0-2)    


 


Band Neutrophils  0 % (0-8)    


 


Platelet Estimate  Decreased  L  


 


Platelet Morphology  Normal    


 


Hypochromasia  2+    


 


Anisocytosis  1+    


 


Spherocytes  1+    


 


Sodium Level


 


 147 MMOL/L


(136-145)  H 


 





 


Potassium Level


 


 3.7 MMOL/L


(3.5-5.1) 


 





 


Chloride Level


 


 113 MMOL/L


()  H 


 





 


Carbon Dioxide Level


 


 30 MMOL/L


(21-32) 


 





 


Anion Gap


 


 4 mmol/L


(5-15)  L 


 





 


Blood Urea Nitrogen


 


 25 mg/dL


(7-18)  H 


 





 


Creatinine


 


 0.6 MG/DL


(0.55-1.30) 


 





 


Estimat Glomerular Filtration


Rate 


 > 60 mL/min


(>60) 


 





 


Glucose Level


 


 172 MG/DL


()  H 


 





 


Calcium Level


 


 7.2 MG/DL


(8.5-10.1)  L 


 





 


Phosphorus Level


 


 3.3 MG/DL


(2.5-4.9) 


 





 


Magnesium Level


 


 2.0 MG/DL


(1.8-2.4) 


 





 


Total Bilirubin


 


 0.7 MG/DL


(0.2-1.0) 


 





 


Aspartate Amino Transf


(AST/SGOT) 


 23 U/L (15-37)


 


 





 


Alanine Aminotransferase


(ALT/SGPT) 


 45 U/L (12-78)


 


 





 


Alkaline Phosphatase


 


 54 U/L


() 


 





 


Total Protein


 


 4.3 G/DL


(6.4-8.2)  L 


 





 


Albumin


 


 1.2 G/DL


(3.4-5.0)  L 


 





 


Globulin  3.1 g/dL    


 


Albumin/Globulin Ratio


 


 0.4 (1.0-2.7)


L 


 





 


Arterial Blood pH


 


 


 


 7.514


(7.350-7.450)


 


Arterial Blood Partial


Pressure CO2 


 


 


 31.1 mmHg


(35.0-45.0)  L


 


Arterial Blood Partial


Pressure O2 


 


 


 82.0 mmHg


(75.0-100.0)


 


Arterial Blood HCO3


 


 


 


 24.5 mmol/L


(22.0-26.0)


 


Arterial Blood Oxygen


Saturation 


 


 


 95.5 %


()


 


Arterial Blood Base Excess    1.9 (-2-2)  


 


Cole Test    Positive  


 


Test


 9/27/20


12:58 9/27/20


16:46 9/27/20


20:38 





 


POC Whole Blood Glucose


 167 MG/DL


()  H 151 MG/DL


()  H Pending  


 




















Verito Barnett MD            Sep 27, 2020 21:21

## 2020-09-27 NOTE — NUR
NURSE NOTES:

Placed back on 70% fio2. Noted Sats 90-91%. RT made aware. Sats now 96%. Will continue to 
monitor.

## 2020-09-27 NOTE — NUR
NURSE NOTES:

Patient turned and repositioned. Noted to leak Serous fluid from abdominal skin tear. 
Dressing changed at this time. Chest tuned dressing dry and on tact. Patient has one brown 
BM also. Sacral dressing remains dry and intact. FIO2 now 60%. Oral care provided. No signs 
of distress noted. Will continue to monitor.

## 2020-09-27 NOTE — INTERNAL MED PROGRESS NOTE
Subjective


Date of Service:  Sep 27, 2020


Physician Name


Sanya Schultz


Attending Physician


Chano Cherry MD





Current Medications








 Medications


  (Trade)  Dose


 Ordered  Sig/Didi


 Route


 PRN Reason  Start Time


 Stop Time Status Last Admin


Dose Admin


 


 Acetaminophen


  (Tylenol)  650 mg  Q4H  PRN


 GT


 For Pain  9/23/20 17:15


 10/23/20 17:14  9/23/20 17:23





 


 Chlorhexidine


 Gluconate


  (Beatrice-Hex 2%)  1 applic  DAILY@2000


 TOPIC


   8/31/20 20:00


 11/29/20 19:59  9/26/20 20:31





 


 Clotrimazole


  (Lotrimin)  1 applic  Q12HR


 TOPIC


   8/30/20 13:00


 11/28/20 12:59  9/27/20 09:33





 


 Dextrose


  (Dextrose 50%)  25 ml  Q30M  PRN


 IV


 Hypoglycemia  8/26/20 11:30


 11/20/20 11:29   





 


 Dextrose


  (Dextrose 50%)  50 ml  Q30M  PRN


 IV


 Hypoglycemia  8/26/20 11:30


 11/20/20 11:29   





 


 Hydrocortisone


  (Solu-CORTEF)  50 mg  EVERY 8  HOURS


 IV


   9/25/20 22:00


 11/28/20 13:59  9/27/20 13:07





 


 Insulin Aspart


  (NovoLOG)    Q6HR


 SUBQ


   9/18/20 12:00


 12/17/20 11:59  9/27/20 13:00





 


 Insulin Detemir


  (Levemir)  10 units  Q12HR


 SUBQ


   9/18/20 10:00


 12/17/20 09:59  9/27/20 09:33





 


 Loperamide HCl


  (Imodium)  2 mg  Q6H  PRN


 NG


 Diarrhea  9/16/20 10:30


 10/16/20 10:29  9/23/20 11:41





 


 Midodrine


  (Pro-Amatine)  10 mg  Q8HR


 GT


   8/28/20 14:00


 11/26/20 13:59  9/27/20 13:07





 


 Potassium Chloride


  (K-Dur)  40 meq  EVERY 12  HOURS


 GT


   9/26/20 21:00


 12/19/20 12:14  9/27/20 09:32











Allergies:  


Coded Allergies:  


     No Known Allergies (Unverified , 1/8/19)


ROS Limited/Unobtainable:  Yes


Subjective


60 YO F with Down's syndrome admitted with hypoxia.  Now sepsis and pneumonia.  

Cover for Int Jose Hung.  ICU.  Intubated and sedated





Objective





Last Vital Signs








  Date Time  Temp Pulse Resp B/P (MAP) Pulse Ox O2 Delivery O2 Flow Rate FiO2


 


9/27/20 12:00        70


 


9/27/20 12:00  50 21 132/75 (94) 96   


 


9/27/20 08:00      Mechanical Ventilator  





      Mechanical Ventilator  





      Mechanical Ventilator  


 


9/27/20 08:00 98.7       











Laboratory Tests








Test


 9/26/20


17:09 9/26/20


20:29 9/26/20


23:32 9/27/20


03:30


 


POC Whole Blood Glucose


 120 MG/DL


()  H Pending  


 141 MG/DL


()  H 





 


White Blood Count


 


 


 


 6.7 K/UL


(4.8-10.8)


 


Red Blood Count


 


 


 


 3.00 M/UL


(4.20-5.40)  L


 


Hemoglobin


 


 


 


 9.4 G/DL


(12.0-16.0)  L


 


Hematocrit


 


 


 


 28.3 %


(37.0-47.0)  L


 


Mean Corpuscular Volume    94 FL (80-99)  


 


Mean Corpuscular Hemoglobin


 


 


 


 31.4 PG


(27.0-31.0)  H


 


Mean Corpuscular Hemoglobin


Concent 


 


 


 33.3 G/DL


(32.0-36.0)


 


Red Cell Distribution Width


 


 


 


 16.6 %


(11.6-14.8)  H


 


Platelet Count


 


 


 


 89 K/UL


(150-450)  L


 


Mean Platelet Volume


 


 


 


 9.3 FL


(6.5-10.1)


 


Neutrophils (%) (Auto)


 


 


 


 % (45.0-75.0)





 


Lymphocytes (%) (Auto)


 


 


 


 % (20.0-45.0)





 


Monocytes (%) (Auto)     % (1.0-10.0)  


 


Eosinophils (%) (Auto)     % (0.0-3.0)  


 


Basophils (%) (Auto)     % (0.0-2.0)  


 


Differential Total Cells


Counted 


 


 


 100  





 


Neutrophils % (Manual)    87 % (45-75)  H


 


Lymphocytes % (Manual)    9 % (20-45)  L


 


Monocytes % (Manual)    4 % (1-10)  


 


Eosinophils % (Manual)    0 % (0-3)  


 


Basophils % (Manual)    0 % (0-2)  


 


Band Neutrophils    0 % (0-8)  


 


Platelet Estimate    Decreased  L


 


Platelet Morphology    Normal  


 


Hypochromasia    2+  


 


Anisocytosis    1+  


 


Spherocytes    1+  


 


Sodium Level


 


 


 


 147 MMOL/L


(136-145)  H


 


Potassium Level


 


 


 


 3.7 MMOL/L


(3.5-5.1)


 


Chloride Level


 


 


 


 113 MMOL/L


()  H


 


Carbon Dioxide Level


 


 


 


 30 MMOL/L


(21-32)


 


Anion Gap


 


 


 


 4 mmol/L


(5-15)  L


 


Blood Urea Nitrogen


 


 


 


 25 mg/dL


(7-18)  H


 


Creatinine


 


 


 


 0.6 MG/DL


(0.55-1.30)


 


Estimat Glomerular Filtration


Rate 


 


 


 > 60 mL/min


(>60)


 


Glucose Level


 


 


 


 172 MG/DL


()  H


 


Calcium Level


 


 


 


 7.2 MG/DL


(8.5-10.1)  L


 


Phosphorus Level


 


 


 


 3.3 MG/DL


(2.5-4.9)


 


Magnesium Level


 


 


 


 2.0 MG/DL


(1.8-2.4)


 


Total Bilirubin


 


 


 


 0.7 MG/DL


(0.2-1.0)


 


Aspartate Amino Transf


(AST/SGOT) 


 


 


 23 U/L (15-37)





 


Alanine Aminotransferase


(ALT/SGPT) 


 


 


 45 U/L (12-78)





 


Alkaline Phosphatase


 


 


 


 54 U/L


()


 


Total Protein


 


 


 


 4.3 G/DL


(6.4-8.2)  L


 


Albumin


 


 


 


 1.2 G/DL


(3.4-5.0)  L


 


Globulin    3.1 g/dL  


 


Albumin/Globulin Ratio


 


 


 


 0.4 (1.0-2.7)


L


 


Test


 9/27/20


05:12 9/27/20


07:52 9/27/20


12:58 





 


POC Whole Blood Glucose


 180 MG/DL


()  H 


 167 MG/DL


()  H 





 


Arterial Blood pH


 


 7.514


(7.350-7.450) 


 





 


Arterial Blood Partial


Pressure CO2 


 31.1 mmHg


(35.0-45.0)  L 


 





 


Arterial Blood Partial


Pressure O2 


 82.0 mmHg


(75.0-100.0) 


 





 


Arterial Blood HCO3


 


 24.5 mmol/L


(22.0-26.0) 


 





 


Arterial Blood Oxygen


Saturation 


 95.5 %


() 


 





 


Arterial Blood Base Excess  1.9 (-2-2)    


 


Cole Test  Positive    

















Intake and Output  


 


 9/26/20 9/27/20





 19:00 07:00


 


Intake Total 1385.0 ml 1100.0 ml


 


Output Total 685 ml 1115 ml


 


Balance 700.0 ml -15.0 ml


 


  


 


IV Total 725.0 ml 250.0 ml


 


Tube Feeding 600 ml 550 ml


 


Other 60 ml 300 ml


 


Output Urine Total 645 ml 1075 ml


 


Chest Tube Drainage Total 40 ml 40 ml


 


# Bowel Movements  2








Objective


General Appearance:  WD/WN, no apparent distress, alert


EENT:  PERRL/EOMI, normal ENT inspection


Neck:  non-tender, normal alignment, supple, normal inspection


Cardiovascular:  normal peripheral pulses, normal rate, regular rhythm, no 

gallop/murmur, no JVD


Respiratory/Chest:  Mech vent; decreased breath sounds, crackles/rales, rhonchi 

- bilaterally, expiratory wheezing


Abdomen:  normal bowel sounds, non tender, soft, no organomegaly, no mass


Extremities:  normal range of motion


Neurologic:  CNs II-XII grossly normal


Skin:  normal pigmentation, warm/dry





Assessment/Plan


Problem List:  


(1) HCAP (healthcare-associated pneumonia)


Assessment & Plan:  Strep Group G.  Continue amikacin per ID=Dr Gomes.  

Pulmonary/Critical care=DR Cherry.  COVID NEG





(2) Sepsis


Assessment & Plan:  Staph haemolyticus.  Continue amikacin per ID=Dr Gomes





(3) Down's syndrome


(4) Dysphagia


Assessment & Plan:  S/P PEG





(5) Seizure disorder


Assessment & Plan:  Continue keppra and depakote





(6) Hypothyroidism


Assessment & Plan:  Continue synthroid





(7) Acute respiratory failure


Assessment & Plan:  Pulmonary = Dr Cherry; [facility] vent














Sanya Schultz MD               Sep 27, 2020 13:13

## 2020-09-27 NOTE — NUR
NURSE NOTES:

Report received from Mayra noel RN.Pt awake,noted no resp distress orally 
intubated,ETT 7.5 lip line 22 cm,AC26,,Fio2 70% RT Chest tube in placed,no signs of 
pain or discomfort, SR on the , GTF Vital AF 1.2 at 50 m,l/hrs Valle cath draining yellow 
urine ,skin  warm and edematous,with water sipping thru the skin,IV site to CARLOS PICC line 
intact ,SR up x2 HOB elevated bed lock in lowest position,will continue with plans of care.

## 2020-09-27 NOTE — NUR
NURSE NOTES:

Patient awake at this time. Chest tube continues to drain serous fluid at this time. 
Dressing remain dry and intact. Valle Draining. FIO2 continues at 70% with Sats 95-97%. Will 
Continue to monitor.

## 2020-09-27 NOTE — DIAGNOSTIC IMAGING REPORT
EXAM:

  XR Chest, 1 View

 

CLINICAL HISTORY:

  SOB

 

TECHNIQUE:

  Frontal view of the chest.

 

COMPARISON:

Chest radiograph September 26, 2020.  

 

FINDINGS/IMPRESSION:

Endotracheal tube terminates 3.5 cm above the lisandro.  Left upper 

extremity PICC line terminates in the superior vena cava.  Right-sided 

chest tube.

 

Pulmonary edema versus multifocal infiltrate/ARDS.  Small bilateral 

pleural effusions.  No pneumothorax.  Overall, the degree of airspace 

opacification is similar in appearance to September 26, 2020.  Continued 

follow up recommended.

 

Cardiomegaly.

## 2020-09-27 NOTE — NUR
NURSE HAND-OFF REPORT: 



Latest Vital Signs: Temperature 98.4 , Pulse 55 , B/P 131 /62 , Respiratory Rate 26 , O2 SAT 
96 , Mechanical Ventilator, O2 Flow Rate 15.0 .  

Vital Sign Comment: 



EKG Rhythm: Sinus Bradycardia

Rhythm change?: N 

MD Notified?: N- MD Aware 

MD Response: 



Latest Momin Fall Score: 70  

Fall Risk: High Risk 

Safety Measures: Call light Within Reach, Bed Alarm Zone 1, Side Rails Side Rails x3, Bed 
position Low and Locked.

Fall Precautions: 

Yellow Socks

Door Sign

Patient Fall Education



Report given to Lexi Nunez Charge Nurse.

## 2020-09-27 NOTE — NUR
NURSE HAND-OFF REPORT: 



Latest Vital Signs: Temperature 98.3 , Pulse 47 , B/P 105 /64 , Respiratory Rate 18 , O2 SAT 
95 , Mechanical Ventilator, O2 Flow Rate 15.0 .  

Vital Sign Comment: stable



EKG Rhythm: Sinus Bradycardia

Rhythm change?: N 

MD Notified?: N

MD Response: No New Orders Received



Latest Momin Fall Score: 70  

Fall Risk: High Risk 

Safety Measures: Call light Within Reach, Bed Alarm Zone 1, Side Rails Side Rails x3, Bed 
position Low and Locked.

Fall Precautions: 

Yellow Socks

Door Sign

Patient Fall Education



Report given to ROBERT Rivera RN.

## 2020-09-27 NOTE — NUR
NURSE NOTES:

Patient turned and repositioned at this time. Chest tube continues on Low intermitted 
Suction. Not signs of distress noted. Will continue to monitor .

## 2020-09-27 NOTE — PULMONOLGY CRITICAL CARE NOTE
Critical Care - Asmt/Plan


Assessment/Plan:


ASSESSMENT


s/p cardiopulmonary arrest


Acute hypoxemic respiratory failure requiring intubation


Sepsis with shock


Persistent CONS bacteremia


Pneumonia FELICE


Pulm edema/ ARDS


R PTX, s/p CT placement 9/21


DAVID due to ATN   2 to hypotension 


Down syndrome


Hypothyroidism


Seizure disorder 


Anemia, requiring blood transfusion 


DM





PLAN OF CARE


ICU


vent support, pulmonary toilet  


COVID x 3 NGT 


fup  with ABG  , remains on high FiO2 ,   decrease rate to 24  


repeat ABG in am 


R sided PTX, 


s/p CT placement 9/21, 


monitor output ,  decreasing 


daily CXR , CXR 9/27 - no PTX


off PEEP 


CT surgery  follows 


Venous duplex BLE   NGT,





on steroids-Hydrocortisone 


off pressors;   


on midodrine 


Echo with pEF 65%  


 


s/p abx  as per ID recs 


COVID-19 x3 NGT


prior persistent CONS bacteremia


Echo no evidence of vegetation 


however BCX 8/29, 9/6, 9/8, 9/19 NGT 


per ID recs keep off abx unless febrile or evidence of infection


 


stool OB +


prior off  Heparin given stool OB +  on  SCD


s/p 3 u PRBC


GI procedures on hold given current condition  


HH stabilized  


GI prophylaxis 





creat worsened due to Vanco , now resolved 


s/p IVF


avoid nephrotoxic  


correct electrolytes as needed


fup with  further nephro recs 


 


continue  levothyroxine , TSH within normal limits 


BS management with LA Levemir and SSI as needed 


seizure precautions,   


supportive care   


 


case discussed and evaluated by supervising physician








Critical Care - Objective





Last 24 Hour Vital Signs








  Date Time  Temp Pulse Resp B/P (MAP) Pulse Ox O2 Delivery O2 Flow Rate FiO2


 


9/27/20 09:00  57 21 124/95 (105) 97   


 


9/27/20 08:00      Mechanical Ventilator  





      Mechanical Ventilator  





      Mechanical Ventilator  


 


9/27/20 08:00 98.7 52 26 133/66 (88) 95   


 


9/27/20 08:00  54      


 


9/27/20 08:00        70


 


9/27/20 07:05  63 26     60


 


9/27/20 06:00  55 26 131/62 (85) 96   


 


9/27/20 05:00  57 25 103/61 (75) 97   


 


9/27/20 04:00        70


 


9/27/20 04:00      Mechanical Ventilator  





      Mechanical Ventilator  





      Mechanical Ventilator  


 


9/27/20 04:00  63      


 


9/27/20 04:00 98.4 62 30 98/58 (71) 96   


 


9/27/20 03:07  60 26     60


 


9/27/20 03:00  62 23 115/80 (92) 97   


 


9/27/20 02:00  56 20 132/68 (89) 96   


 


9/27/20 01:00  54 20 127/86 (100) 96   


 


9/27/20 00:00        70


 


9/27/20 00:00      Mechanical Ventilator  





      Mechanical Ventilator  





      Mechanical Ventilator  


 


9/27/20 00:00  55      


 


9/27/20 00:00 98.6 54 23 116/64 (81) 97   


 


9/26/20 23:00  71 20 111/95 (100) 68   


 


9/26/20 22:51  58 26     70


 


9/26/20 22:00  64 30 103/79 (87) 93   


 


9/26/20 21:00  64 27 108/86 (93) 100   


 


9/26/20 20:00 98.7 64 26 107/63 (78) 95   


 


9/26/20 20:00        70


 


9/26/20 20:00      Mechanical Ventilator  





      Mechanical Ventilator  





      Mechanical Ventilator  





      Mechanical Ventilator  


 


9/26/20 20:00  62      


 


9/26/20 19:18  61 26     70


 


9/26/20 19:00  60 26 109/67 (81) 92   


 


9/26/20 18:00  55 26 98/64 (75) 96   


 


9/26/20 17:00  53 29 110/59 (76) 96   


 


9/26/20 16:00 98.5 72 24 101/78 (86) 95   


 


9/26/20 16:00        70


 


9/26/20 16:00      Mechanical Ventilator  





      Mechanical Ventilator  


 


9/26/20 15:46  64      


 


9/26/20 15:05  62 26     70


 


9/26/20 15:00  55 27 106/93 (97) 95   


 


9/26/20 14:00  56 28 110/73 (85) 98   


 


9/26/20 13:00  45 26 107/83 (91) 95   


 


9/26/20 12:00 99.0 57 28 138/63 (88) 96   


 


9/26/20 12:00      Mechanical Ventilator  





      Mechanical Ventilator  


 


9/26/20 11:52  46      


 


9/26/20 11:05  47 26     70


 


9/26/20 11:00  47 26 120/67 (84) 96   








Objective:


Status:   sedated, on Vent -26- 70%  no PEEP


Condition:  critical


HEENT:  atraumatic, normocephalic,  face with Down features, OP with ET in 

place, intact


Lungs:  R sided CT to Hemovac with serosanguineous drainage   ( output 

decreasing), BL crackles, R side also few exp wheezes 


Abdomen:  soft, non-tender, feeding tube


Decubiti:  +1 edema BLE


Accucheck:  180





Critical Care - Subjective


ROS Limited/Unobtainable:  Yes


Interval Events:


remains afebrile, no leukocytosis 


ABG noted this am , on 70% FIO2 


CXR this am no evidence of PTX  


remains bradycardic in high 50 and tachypneic


output from CT decreasing


Condition:  critical


IV Access:  PICC - LUE intact


EKG Rhythm:  Sinus Bradycardia


FI02:  70


Vent Support Breath Rate:  26


Vent Support Mode:  AC


Vent Tidal Volume:  500


Sputum Amount:  Small


PEEP:  0.0


PIP:  45


Tube Feeding Amount:  50


I&O:











Intake and Output  


 


 9/26/20 9/27/20





 19:00 07:00


 


Intake Total 1385.0 ml 1100.0 ml


 


Output Total 685 ml 1115 ml


 


Balance 700.0 ml -15.0 ml


 


  


 


IV Total 725.0 ml 250.0 ml


 


Tube Feeding 600 ml 550 ml


 


Other 60 ml 300 ml


 


Output Urine Total 645 ml 1075 ml


 


Chest Tube Drainage Total 40 ml 40 ml


 


# Bowel Movements  2








CXR:


9/27 Endotracheal tube terminates 3.5 cm above the lisandro.  Left upper 


extremity PICC line terminates in the superior vena cava.  Right-sided 


chest tube.


 


Pulmonary edema versus multifocal infiltrate/ARDS.  Small bilateral 


pleural effusions.  No pneumothorax.  Overall, the degree of airspace 


opacification is similar in appearance to September 26, 2020.  Continued 


follow up recommended.


 


Cardiomegaly.


ET-Tube:  7.5


ET Position:  22











Joyce Welch NP                Sep 27, 2020 10:29

## 2020-09-27 NOTE — NUR
NURSE NOTES:

Bed bath given,pt soaked up with urine,Valle cath got detached from th connection,pulled up 
repositioned,kept dry and clean.

## 2020-09-27 NOTE — NUR
NURSE NOTES:

Pt stable ,but HR dip down to LOw 30's when asleep,woke pt up,hr went back up to 50's/min.

## 2020-09-28 VITALS — SYSTOLIC BLOOD PRESSURE: 138 MMHG | DIASTOLIC BLOOD PRESSURE: 77 MMHG

## 2020-09-28 VITALS — SYSTOLIC BLOOD PRESSURE: 124 MMHG | DIASTOLIC BLOOD PRESSURE: 74 MMHG

## 2020-09-28 VITALS — SYSTOLIC BLOOD PRESSURE: 94 MMHG | DIASTOLIC BLOOD PRESSURE: 74 MMHG

## 2020-09-28 VITALS — SYSTOLIC BLOOD PRESSURE: 108 MMHG | DIASTOLIC BLOOD PRESSURE: 52 MMHG

## 2020-09-28 VITALS — DIASTOLIC BLOOD PRESSURE: 67 MMHG | SYSTOLIC BLOOD PRESSURE: 123 MMHG

## 2020-09-28 VITALS — SYSTOLIC BLOOD PRESSURE: 116 MMHG | DIASTOLIC BLOOD PRESSURE: 62 MMHG

## 2020-09-28 VITALS — SYSTOLIC BLOOD PRESSURE: 103 MMHG | DIASTOLIC BLOOD PRESSURE: 78 MMHG

## 2020-09-28 VITALS — DIASTOLIC BLOOD PRESSURE: 73 MMHG | SYSTOLIC BLOOD PRESSURE: 123 MMHG

## 2020-09-28 VITALS — DIASTOLIC BLOOD PRESSURE: 74 MMHG | SYSTOLIC BLOOD PRESSURE: 106 MMHG

## 2020-09-28 VITALS — DIASTOLIC BLOOD PRESSURE: 63 MMHG | SYSTOLIC BLOOD PRESSURE: 126 MMHG

## 2020-09-28 VITALS — SYSTOLIC BLOOD PRESSURE: 131 MMHG | DIASTOLIC BLOOD PRESSURE: 57 MMHG

## 2020-09-28 VITALS — DIASTOLIC BLOOD PRESSURE: 60 MMHG | SYSTOLIC BLOOD PRESSURE: 107 MMHG

## 2020-09-28 VITALS — SYSTOLIC BLOOD PRESSURE: 132 MMHG | DIASTOLIC BLOOD PRESSURE: 64 MMHG

## 2020-09-28 VITALS — SYSTOLIC BLOOD PRESSURE: 116 MMHG | DIASTOLIC BLOOD PRESSURE: 74 MMHG

## 2020-09-28 VITALS — DIASTOLIC BLOOD PRESSURE: 67 MMHG | SYSTOLIC BLOOD PRESSURE: 124 MMHG

## 2020-09-28 VITALS — SYSTOLIC BLOOD PRESSURE: 101 MMHG | DIASTOLIC BLOOD PRESSURE: 41 MMHG

## 2020-09-28 VITALS — SYSTOLIC BLOOD PRESSURE: 112 MMHG | DIASTOLIC BLOOD PRESSURE: 61 MMHG

## 2020-09-28 VITALS — SYSTOLIC BLOOD PRESSURE: 124 MMHG | DIASTOLIC BLOOD PRESSURE: 77 MMHG

## 2020-09-28 VITALS — DIASTOLIC BLOOD PRESSURE: 78 MMHG | SYSTOLIC BLOOD PRESSURE: 109 MMHG

## 2020-09-28 VITALS — SYSTOLIC BLOOD PRESSURE: 121 MMHG | DIASTOLIC BLOOD PRESSURE: 87 MMHG

## 2020-09-28 VITALS — SYSTOLIC BLOOD PRESSURE: 120 MMHG | DIASTOLIC BLOOD PRESSURE: 76 MMHG

## 2020-09-28 VITALS — SYSTOLIC BLOOD PRESSURE: 114 MMHG | DIASTOLIC BLOOD PRESSURE: 66 MMHG

## 2020-09-28 VITALS — DIASTOLIC BLOOD PRESSURE: 85 MMHG | SYSTOLIC BLOOD PRESSURE: 128 MMHG

## 2020-09-28 VITALS — DIASTOLIC BLOOD PRESSURE: 86 MMHG | SYSTOLIC BLOOD PRESSURE: 110 MMHG

## 2020-09-28 LAB
ADD MANUAL DIFF: NO
ALBUMIN SERPL-MCNC: 1.3 G/DL (ref 3.4–5)
ALP SERPL-CCNC: 53 U/L (ref 46–116)
ALT SERPL-CCNC: 41 U/L (ref 12–78)
ANION GAP SERPL CALC-SCNC: 6 MMOL/L (ref 5–15)
AST SERPL-CCNC: 20 U/L (ref 15–37)
BILIRUB DIRECT SERPL-MCNC: 0.2 MG/DL (ref 0–0.3)
BILIRUB SERPL-MCNC: 0.6 MG/DL (ref 0.2–1)
BUN SERPL-MCNC: 23 MG/DL (ref 7–18)
CALCIUM SERPL-MCNC: 7.3 MG/DL (ref 8.5–10.1)
CHLORIDE SERPL-SCNC: 113 MMOL/L (ref 98–107)
CO2 SERPL-SCNC: 30 MMOL/L (ref 21–32)
CREAT SERPL-MCNC: 0.6 MG/DL (ref 0.55–1.3)
ERYTHROCYTE [DISTWIDTH] IN BLOOD BY AUTOMATED COUNT: 16.3 % (ref 11.6–14.8)
HCT VFR BLD CALC: 27.6 % (ref 37–47)
HGB BLD-MCNC: 9.5 G/DL (ref 12–16)
MCV RBC AUTO: 93 FL (ref 80–99)
PHOSPHATE SERPL-MCNC: 2.7 MG/DL (ref 2.5–4.9)
PLATELET # BLD: 115 K/UL (ref 150–450)
POTASSIUM SERPL-SCNC: 3.5 MMOL/L (ref 3.5–5.1)
RBC # BLD AUTO: 2.95 M/UL (ref 4.2–5.4)
SODIUM SERPL-SCNC: 148 MMOL/L (ref 136–145)
WBC # BLD AUTO: 6.7 K/UL (ref 4.8–10.8)

## 2020-09-28 RX ADMIN — INSULIN DETEMIR SCH UNITS: 100 INJECTION, SOLUTION SUBCUTANEOUS at 08:40

## 2020-09-28 RX ADMIN — INSULIN ASPART SCH UNITS: 100 INJECTION, SOLUTION INTRAVENOUS; SUBCUTANEOUS at 13:06

## 2020-09-28 RX ADMIN — ACETAMINOPHEN PRN MG: 160 SOLUTION ORAL at 13:04

## 2020-09-28 RX ADMIN — HYDROCORTISONE SODIUM SUCCINATE SCH MG: 100 INJECTION, POWDER, FOR SOLUTION INTRAMUSCULAR; INTRAVENOUS at 21:49

## 2020-09-28 RX ADMIN — PANTOPRAZOLE SODIUM SCH MG: 40 INJECTION, POWDER, FOR SOLUTION INTRAVENOUS at 20:53

## 2020-09-28 RX ADMIN — INSULIN ASPART SCH UNITS: 100 INJECTION, SOLUTION INTRAVENOUS; SUBCUTANEOUS at 17:58

## 2020-09-28 RX ADMIN — HYDROCORTISONE SODIUM SUCCINATE SCH MG: 100 INJECTION, POWDER, FOR SOLUTION INTRAMUSCULAR; INTRAVENOUS at 13:04

## 2020-09-28 RX ADMIN — INSULIN ASPART SCH UNITS: 100 INJECTION, SOLUTION INTRAVENOUS; SUBCUTANEOUS at 00:22

## 2020-09-28 RX ADMIN — HYDROCORTISONE SODIUM SUCCINATE SCH MG: 100 INJECTION, POWDER, FOR SOLUTION INTRAMUSCULAR; INTRAVENOUS at 06:08

## 2020-09-28 RX ADMIN — MIDODRINE HYDROCHLORIDE SCH MG: 10 TABLET ORAL at 05:41

## 2020-09-28 RX ADMIN — CHLORHEXIDINE GLUCONATE SCH APPLIC: 213 SOLUTION TOPICAL at 20:51

## 2020-09-28 RX ADMIN — INSULIN ASPART SCH UNITS: 100 INJECTION, SOLUTION INTRAVENOUS; SUBCUTANEOUS at 05:43

## 2020-09-28 RX ADMIN — INSULIN DETEMIR SCH UNITS: 100 INJECTION, SOLUTION SUBCUTANEOUS at 20:56

## 2020-09-28 NOTE — NUR
NURSE HAND-OFF REPORT: 



Latest Vital Signs: Temperature 98.6 , Pulse 49 , B/P 123 /73 , Respiratory Rate 22 , O2 SAT 
98 , Mechanical Ventilator, O2 Flow Rate 15.0 .  

Vital Sign Comment: With right lateral chest tube connected to low intermittent suction. 



EKG Rhythm: Sinus Bradycardia

Rhythm change?: N 

Latest Momin Fall Score: 70  

Fall Risk: High Risk 

Safety Measures: Call light Within Reach, Bed Alarm Zone 1, Side Rails Side Rails x3, Bed 
position Low and Locked.

Fall Precautions: 

Yellow Socks

Door Sign

Patient Fall Education

Contact isolation observed.

Report given to Roger CHANEL RN

## 2020-09-28 NOTE — NUR
NURSE NOTES:



Dr. Nino at bedside. With orders noted to discontinue midodrine. And to monitor 
patient's heart rate.

## 2020-09-28 NOTE — PULMONOLGY CRITICAL CARE NOTE
Critical Care - Asmt/Plan


Problems:  


(1) Acute respiratory failure


(2) Pneumothorax


(3) Bacteremia


(4) Pneumonia


(5) Sepsis


(6) HCAP (healthcare-associated pneumonia)


(7) Seizure disorder


(8) Down's syndrome


(9) Trisomy 21, Down syndrome


Respiratory:  monitor respiratory rate, adjust FIO2, CXR


Cardiac:  continue pressors


Renal:  F/U I&O, keep IV fluid, check electrolytes


Infectious Disease:  check cultures, continue antibiotics


Gastrointestinal:  continue feedings/current rate


Endocrine:  monitor blood sugar, continue sliding scale insulin


Hematologic:  monitor H/H, transfuse if hgb<8.5


Neurologic:  keep patient comfortable


Prophylaxis:  Heparin


Time Spent (Minutes):  40


Notes Reviewed:  cardio, renal


Discussed with:  nurses, consultants, 





Critical Care - Objective





Last 24 Hour Vital Signs








  Date Time  Temp Pulse Resp B/P (MAP) Pulse Ox O2 Delivery O2 Flow Rate FiO2


 


9/28/20 09:00  50 24 121/87 (98) 94   


 


9/28/20 08:03  51      


 


9/28/20 08:00      Mechanical Ventilator  





      Mechanical Ventilator  





      Mechanical Ventilator  


 


9/28/20 08:00 96.5 47 26 109/78 (88) 97   


 


9/28/20 08:00        70


 


9/28/20 07:33  52 24     70


 


9/28/20 07:00  48 23 94/74 (81) 97   


 


9/28/20 06:00  48 30 120/76 (91) 98   


 


9/28/20 05:00  50 24 106/74 (85) 96   


 


9/28/20 05:00  50 24 106/74 (85) 96   


 


9/28/20 04:00  56      


 


9/28/20 04:00 98.0 56 31 110/86 (94) 94   


 


9/28/20 04:00        70


 


9/28/20 04:00      Mechanical Ventilator  





      Mechanical Ventilator  





      Mechanical Ventilator  


 


9/28/20 04:00  56 31 110/86 (94) 94   


 


9/28/20 03:30  55 24     70


 


9/28/20 03:00  48 29 112/61 (78) 97   


 


9/28/20 03:00  48 29 112/61 (78) 97   


 


9/28/20 02:00  47 23 123/67 (85) 97   


 


9/28/20 02:00  50 24 123/67 (85) 97   


 


9/28/20 01:00  46 23 132/64 (86) 96   


 


9/28/20 01:00  46 23 132/64 (86) 96   


 


9/28/20 00:00      Mechanical Ventilator  





      Mechanical Ventilator  





      Mechanical Ventilator  


 


9/28/20 00:00  41 18 124/67 (86) 95   


 


9/28/20 00:00        70


 


9/28/20 00:00  40      


 


9/28/20 00:00 97.8 41 18 124/67 (86) 95   


 


9/27/20 23:00  49 25 121/58 (79) 95   


 


9/27/20 22:58  48 25     70


 


9/27/20 22:00  41 24 89/55 (66) 96   


 


9/27/20 21:00  43 30 123/90 (101) 98   


 


9/27/20 20:00 98.5 50 21 147/68 (94) 95   


 


9/27/20 20:00      Mechanical Ventilator  





      Mechanical Ventilator  





      Mechanical Ventilator  


 


9/27/20 20:00        70


 


9/27/20 20:00  47      


 


9/27/20 19:08  47 24     70


 


9/27/20 19:00  50 18 105/64 (78) 95   


 


9/27/20 18:00  47 18 105/64 (78) 95   


 


9/27/20 17:00 98.3 45 18 144/68 (93) 94   


 


9/27/20 16:00  45      


 


9/27/20 16:00  45 22 137/68 (91) 95   


 


9/27/20 16:00      Mechanical Ventilator  





      Mechanical Ventilator  





      Mechanical Ventilator  


 


9/27/20 16:00        70


 


9/27/20 15:05  47 24     60


 


9/27/20 15:00  41 15 153/82 (105) 95   


 


9/27/20 14:00  46 23 146/79 (101) 97   


 


9/27/20 13:00  53 16 147/95 (112) 94   


 


9/27/20 12:00        70


 


9/27/20 12:00      Mechanical Ventilator  





      Mechanical Ventilator  





      Mechanical Ventilator  


 


9/27/20 12:00  50 21 132/75 (94) 96   


 


9/27/20 12:00 98.5       


 


9/27/20 12:00  49      


 


9/27/20 11:05  67 25     60


 


9/27/20 11:00  46 21 119/68 (85) 95   








Status:  awake


Condition:  critical


HEENT:  atraumatic, normocephalic


Neck:  full ROM


Lungs:  clear


Heart:  HR/BP stable


Abdomen:  soft, non-tender


Extremities:  no C/C/E


Decubiti:  location


Accucheck:  202





Critical Care - Subjective


FI02:  70


Vent Support Breath Rate:  24


Vent Support Mode:  AC


Vent Tidal Volume:  500


Sputum Amount:  Small


PEEP:  0.0


PIP:  38


Tube Feeding Amount:  50


I&O:











Intake and Output  


 


 9/27/20 9/28/20





 19:00 07:00


 


Intake Total 1140 ml 1000 ml


 


Output Total 1080 ml 1160 ml


 


Balance 60 ml -160 ml


 


  


 


Free Water 300 ml 350 ml


 


Tube Feeding 600 ml 600 ml


 


Other 240 ml 50 ml


 


Output Urine Total 1050 ml 1060 ml


 


Chest Tube Drainage Total 30 ml 100 ml


 


# Bowel Movements 3 2








CXR:


extensive bilateral infiltrate


ET-Tube:  7.5


ET Position:  22


Labs:





Laboratory Tests








Test


 9/27/20


12:58 9/27/20


16:46 9/27/20


20:38 9/28/20


04:00


 


POC Whole Blood Glucose


 167 MG/DL


()  H 151 MG/DL


()  H Pending  


 





 


White Blood Count


 


 


 


 6.7 K/UL


(4.8-10.8)


 


Red Blood Count


 


 


 


 2.95 M/UL


(4.20-5.40)  L


 


Hemoglobin


 


 


 


 9.5 G/DL


(12.0-16.0)  L


 


Hematocrit


 


 


 


 27.6 %


(37.0-47.0)  L


 


Mean Corpuscular Volume    93 FL (80-99)  


 


Mean Corpuscular Hemoglobin


 


 


 


 32.2 PG


(27.0-31.0)  H


 


Mean Corpuscular Hemoglobin


Concent 


 


 


 34.4 G/DL


(32.0-36.0)


 


Red Cell Distribution Width


 


 


 


 16.3 %


(11.6-14.8)  H


 


Platelet Count


 


 


 


 115 K/UL


(150-450)  L


 


Mean Platelet Volume


 


 


 


 9.5 FL


(6.5-10.1)


 


Neutrophils (%) (Auto)


 


 


 


 % (45.0-75.0)





 


Lymphocytes (%) (Auto)


 


 


 


 % (20.0-45.0)





 


Monocytes (%) (Auto)     % (1.0-10.0)  


 


Eosinophils (%) (Auto)     % (0.0-3.0)  


 


Basophils (%) (Auto)     % (0.0-2.0)  


 


Sodium Level


 


 


 


 148 MMOL/L


(136-145)  H


 


Potassium Level


 


 


 


 3.5 MMOL/L


(3.5-5.1)


 


Chloride Level


 


 


 


 113 MMOL/L


()  H


 


Carbon Dioxide Level


 


 


 


 30 MMOL/L


(21-32)


 


Anion Gap


 


 


 


 6 mmol/L


(5-15)


 


Blood Urea Nitrogen


 


 


 


 23 mg/dL


(7-18)  H


 


Creatinine


 


 


 


 0.6 MG/DL


(0.55-1.30)


 


Estimat Glomerular Filtration


Rate 


 


 


 > 60 mL/min


(>60)


 


Glucose Level


 


 


 


 194 MG/DL


()  H


 


Calcium Level


 


 


 


 7.3 MG/DL


(8.5-10.1)  L


 


Test


 9/28/20


05:34 9/28/20


07:43 9/28/20


09:00 





 


POC Whole Blood Glucose


 177 MG/DL


()  H 


 


 





 


Arterial Blood pH


 


 7.524


(7.350-7.450) 


 





 


Arterial Blood Partial


Pressure CO2 


 34.3 mmHg


(35.0-45.0)  L 


 





 


Arterial Blood Partial


Pressure O2 


 87.2 mmHg


(75.0-100.0) 


 





 


Arterial Blood HCO3


 


 27.6 mmol/L


(22.0-26.0)  H 


 





 


Arterial Blood Oxygen


Saturation 


 96.1 %


() 


 





 


Arterial Blood Base Excess  4.8 (-2-2)  H  


 


Cole Test  Positive    


 


Phosphorus Level


 


 


 2.7 MG/DL


(2.5-4.9) 





 


Magnesium Level


 


 


 2.0 MG/DL


(1.8-2.4) 





 


Total Bilirubin


 


 


 0.6 MG/DL


(0.2-1.0) 





 


Direct Bilirubin


 


 


 0.2 MG/DL


(0.0-0.3) 





 


Aspartate Amino Transf


(AST/SGOT) 


 


 20 U/L (15-37)


 





 


Alanine Aminotransferase


(ALT/SGPT) 


 


 41 U/L (12-78)


 





 


Alkaline Phosphatase


 


 


 53 U/L


() 





 


Total Protein


 


 


 4.4 G/DL


(6.4-8.2)  L 





 


Albumin


 


 


 1.3 G/DL


(3.4-5.0)  Chano Owusu MD              Sep 28, 2020 10:12

## 2020-09-28 NOTE — NUR
NURSE NOTES:

received report from yamila rn  pt orally intubated -vent o2 sat 100% open eyes to touch 
moving left upper hand  chest -tube lilliam vac- intret suction with serous drainage  
reposition and suction

## 2020-09-28 NOTE — DIAGNOSTIC IMAGING REPORT
Indication: Shortness of breath

 

Technique: One view of the chest

 

Comparison: 9/27/2020

 

Findings: Right chest tube remains. There is a tiny sliver of a pneumothorax in the

right lung apex. Stable satisfactory position of endotracheal tube and left arm PICC.

Diffuse bilateral interstitial and airspace infiltrates are unchanged

 

Impression: There is a sliver of a right apical pneumothorax, but adequate chest tube

position. Recommend further monitoring.

 

Otherwise unchanged over one day

## 2020-09-28 NOTE — NEPHROLOGY PROGRESS NOTE
Assessment/Plan


Problem List:  


(1) DAVID (acute kidney injury)


(2) Respiratory failure requiring intubation


(3) Down's syndrome


(4) Seizure disorder


(5) Hypothyroidism


Assessment





Acute renal failure, likely due to hypotension


Acute respiratory distress, hypoxia


Seizure disorder


Hypothyroidism


Down syndrome


Full code











Fluid challenge with IV fluids and albumin


Midodrine for BP above 100 systolic


Check TSH level


Check


Correct level


Monitor renal parameters


Urine studies


Per orders


Plan


September 28: On ventilator.  Full code.  Heart rate low.  Will discontinue 

Midodrin.  Continue to rest.  Electrolytes within normal limit.  Discussed with 

RN.


September 27: Remains intubated.  Full code.  No plan for tracheostomy yet.  

Labs reviewed.  All acceptable.  Continue same management


September 26: Labs reviewed.  Discussed with RN.  Potassium and phosphorus and 

magnesium replacement ordered.  Hemoglobin 9.5.  Patient remains full code.  

Continue per consultants.


September 25: Lab reviewed.  Renal parameters stable.  Full code.  Intubated.  

Electrolyte abnormalities addressed and replacement done.


September 24: Lab reviewed.  Renal parameters stable.  Full code.  Intubated.  

Has right side chest tube.  Continue per consultants.


September 23: Lab reviewed.  Renal parameters stable.  Full code.  Has right 

chest tube.  Planning process for tracheostomy.  Defer to chest and general 

surgeon.


September 22: Labs reviewed.  Renal parameters stable.  Remains full code.  

Remains on ventilator.  Due for tracheostomy tomorrow.  Continue per 

consultants.  Patient has a right chest tube in place at this time.


September 21: Labs reviewed.  Electrolyte imbalances addressed and supplemented.

 Remains full code and on ventilator.  Continue to monitor renal parameters.  

Continue per consultants.


September 20: Labs reviewed.  Serum potassium again low today.  Potassium 

supplement IV and through GT given.  Patient remains full code and is on 

ventilator.  Continue per consultants.  Continue to monitor electrolytes and 

renal parameters.


September 19: Labs reviewed.  Abnormal electrolyte addressed.  Remains full 

code.  Remains vented.


September 18: Day 27 of hospitalization.  Full code.  Labs reviewed.  Hemoglobin

down to 7.5.  Electrolyte abnormalities addressed and corrections ordered.  

Continue to monitor renal parameters.  Continue per consultants.  Start on 

Levemir for blood sugar management.  Questioning continuation of hydrocortisone?


September 17: Labs reviewed.  Potassium, phosphorus, hemoglobin, are all low.  

Potassium and phosphorus IV replacement given.  Continue to monitor electrolytes

and CBC.  Patient remains full code.


September 16: Lab reviewed.  Low phosphorus low magnesium and low potassium was 

addressed.  Hemoglobin drifting lower.  Continue per consultants.  Patient 

remains full code.


September 15: Labs reviewed.  Patient continues to be on ventilator.  D5W for 

high sodium and also potassium chloride intravenously as supplement given.  

Hemoglobin 8.4.  Continue to monitor electrolytes and renal parameters.


September 14: Labs reviewed.  Low potassium and high sodium noted.  Hemoglobin 

8.1 stable.  Aim to correct abnormal electrolyte.  Continue rest.  Will give 2 

boluses of D5W 500 cc.


September 13: Lab reviewed.  Abnormal electrolytes noted and addressed.


September 12: Labs reviewed.  Potassium supplement given.  Patient remains full 

code.  Continue per consultants.


September 11: Lab reviewed.  Electrolyte abnormalities addressed.  Continue per 

pulmonary and ID.


September 10: Lab reviewed.  Status unchanged.  Serum sodium 151 unchanged.  

Stable from renal standpoint of view.


September 9: Labs reviewed.  Status quo.  D5W 500 cc IV ordered.  Continue to 

monitor renal parameters.


September 8: Status quo.  Labs reviewed.  Overall condition unchanged.  Patient 

was transfused and hemoglobin higher.  Continue current management.  Patient rem

ains full code.


September 7: Status quo.  Overall condition poor.  Very low albumin.  Edematous.

 Hypotensive.  Hemoglobin lower.  Anemia work-up ordered.  I favor transfusion 2

units of packed RBCs.  Patient remains full code.  I favor supportive care only.

 Will discuss.


September 6: Electrolyte abnormalities addressed.  Serum creatinine lower.  

Continue per current management.


September 5: Status unchanged.  Lab reviewed.  Serum potassium 2.7.  IV 

potassium chloride ordered.  Serum creatinine low at 1.6 stable.  Blood pressure

90s systolic


September 4: Status quo.  Labs reviewed.  Renal parameters stable.  Serum 

creatinine down to 1.6.  Medication list reviewed.  Continues to be on 

midodrine.  Continue per consultants.


September 3: Status quo.  Labs reviewed.  Electrolytes adjusted.  Serum 

creatinine down to 1.8.  Continue per consultants.


September 2: Status quo.  Labs reviewed.  Phosphorus supplement IV given.  Serum

creatinine 2.  Continue per consultants.


September 1: Requires less pressors.  Albumin bolus given.  1 dose of Lasix IV 

ordered as the patient severely edematous.  Patient serum albumin is very low.  

Continue per consultants.


August 31: Continues to be intubated.  Labs reviewed.  Serum creatinine 1.9 

unchanged.  Blood pressure more stable.  Off 1 of the pressors.  Continue to 

monitor renal parameters.  Continue per consultants.  Patient now on 

hydrocortisone 100 mg every 8 hours.  Will decrease IV fluid.  Normal saline 

down to 50 cc an hour.


August 30: Intubated.  Labs reviewed.  Creatinine 1.9 unchanged.  Continue same 

treatment plan.  Per consultants.  Overall poor prognosis since the patient 

remains on pressors and her pulmonary status is worsening.


August 29: Remains intubated.  Labs reviewed.  Creatinine 1.9.  Blood pressure 

systolic 90s.  Continue per consultants.


August 28: Remains intubated.  Labs reviewed.  Serum creatinine lower to 2.  

Vancomycin level lower.  Remains hypotensive on pressors.  Will increase 

midodrine to 10 mg every 8 hours.  Continue per consultants.  Continue to monit

or renal parameters.


August 27: Patient now in ICU.  Intubated.  On pressors.  Labs reviewed.  Will 

increase midodrine.  Aim to keep blood pressure over 100 systolic.  Will give 

albumin bolus.  Will check vancomycin level which was elevated when checked pr

eviously on August 24.  Will monitor renal parameters.  Continue per 

consultants.





Subjective


ROS Limited/Unobtainable:  Yes





Objective


Objective





Last 24 Hour Vital Signs








  Date Time  Temp Pulse Resp B/P (MAP) Pulse Ox O2 Delivery O2 Flow Rate FiO2


 


9/28/20 11:00  51 20 128/85 (99) 97   


 


9/28/20 10:30  49 24     70


 


9/28/20 10:00  48 22 138/77 (97) 96   


 


9/28/20 09:00  50 24 121/87 (98) 94   


 


9/28/20 08:03  51      


 


9/28/20 08:00      Mechanical Ventilator  





      Mechanical Ventilator  





      Mechanical Ventilator  


 


9/28/20 08:00 96.5 47 26 109/78 (88) 97   


 


9/28/20 08:00        70


 


9/28/20 07:33  52 24     70


 


9/28/20 07:00  48 23 94/74 (81) 97   


 


9/28/20 06:00  48 30 120/76 (91) 98   


 


9/28/20 05:00  50 24 106/74 (85) 96   


 


9/28/20 05:00  50 24 106/74 (85) 96   


 


9/28/20 04:00  56      


 


9/28/20 04:00 98.0 56 31 110/86 (94) 94   


 


9/28/20 04:00        70


 


9/28/20 04:00      Mechanical Ventilator  





      Mechanical Ventilator  





      Mechanical Ventilator  


 


9/28/20 04:00  56 31 110/86 (94) 94   


 


9/28/20 03:30  55 24     70


 


9/28/20 03:00  48 29 112/61 (78) 97   


 


9/28/20 03:00  48 29 112/61 (78) 97   


 


9/28/20 02:00  47 23 123/67 (85) 97   


 


9/28/20 02:00  50 24 123/67 (85) 97   


 


9/28/20 01:00  46 23 132/64 (86) 96   


 


9/28/20 01:00  46 23 132/64 (86) 96   


 


9/28/20 00:00      Mechanical Ventilator  





      Mechanical Ventilator  





      Mechanical Ventilator  


 


9/28/20 00:00  41 18 124/67 (86) 95   


 


9/28/20 00:00        70


 


9/28/20 00:00  40      


 


9/28/20 00:00 97.8 41 18 124/67 (86) 95   


 


9/27/20 23:00  49 25 121/58 (79) 95   


 


9/27/20 22:58  48 25     70


 


9/27/20 22:00  41 24 89/55 (66) 96   


 


9/27/20 21:00  43 30 123/90 (101) 98   


 


9/27/20 20:00 98.5 50 21 147/68 (94) 95   


 


9/27/20 20:00      Mechanical Ventilator  





      Mechanical Ventilator  





      Mechanical Ventilator  


 


9/27/20 20:00        70


 


9/27/20 20:00  47      


 


9/27/20 19:08  47 24     70


 


9/27/20 19:00  50 18 105/64 (78) 95   


 


9/27/20 18:00  47 18 105/64 (78) 95   


 


9/27/20 17:00 98.3 45 18 144/68 (93) 94   


 


9/27/20 16:00  45      


 


9/27/20 16:00  45 22 137/68 (91) 95   


 


9/27/20 16:00      Mechanical Ventilator  





      Mechanical Ventilator  





      Mechanical Ventilator  


 


9/27/20 16:00        70


 


9/27/20 15:05  47 24     60


 


9/27/20 15:00  41 15 153/82 (105) 95   


 


9/27/20 14:00  46 23 146/79 (101) 97   


 


9/27/20 13:00  53 16 147/95 (112) 94   


 


9/27/20 12:00        70


 


9/27/20 12:00      Mechanical Ventilator  





      Mechanical Ventilator  





      Mechanical Ventilator  


 


9/27/20 12:00  50 21 132/75 (94) 96   


 


9/27/20 12:00 98.5       


 


9/27/20 12:00  49      

















Intake and Output  


 


 9/27/20 9/28/20





 19:00 07:00


 


Intake Total 1140 ml 1000 ml


 


Output Total 1080 ml 1160 ml


 


Balance 60 ml -160 ml


 


  


 


Free Water 300 ml 350 ml


 


Tube Feeding 600 ml 600 ml


 


Other 240 ml 50 ml


 


Output Urine Total 1050 ml 1060 ml


 


Chest Tube Drainage Total 30 ml 100 ml


 


# Bowel Movements 3 2








Laboratory Tests


9/27/20 12:58: POC Whole Blood Glucose 167H


9/27/20 16:46: POC Whole Blood Glucose 151H


9/27/20 20:38: POC Whole Blood Glucose [Pending]


9/28/20 04:00: 


White Blood Count 6.7, Red Blood Count 2.95L, Hemoglobin 9.5L, Hematocrit 27.6L,

 Mean Corpuscular Volume 93, Mean Corpuscular Hemoglobin 32.2H, Mean Corpuscular

 Hemoglobin Concent 34.4, Red Cell Distribution Width 16.3H, Platelet Count 115L

, Mean Platelet Volume 9.5, Neutrophils (%) (Auto) , Lymphocytes (%) (Auto) , 

Monocytes (%) (Auto) , Eosinophils (%) (Auto) , Basophils (%) (Auto) , Sodium 

Level 148H, Potassium Level 3.5, Chloride Level 113H, Carbon Dioxide Level 30, 

Anion Gap 6, Blood Urea Nitrogen 23H, Creatinine 0.6, Estimat Glomerular 

Filtration Rate > 60, Glucose Level 194H, Calcium Level 7.3L


9/28/20 05:34: POC Whole Blood Glucose 177H


9/28/20 07:43: 


Arterial Blood pH 7.524H, Arterial Blood Partial Pressure CO2 34.3L, Arterial 

Blood Partial Pressure O2 87.2, Arterial Blood HCO3 27.6H, Arterial Blood Oxygen

 Saturation 96.1, Arterial Blood Base Excess 4.8H, Cole Test Positive


9/28/20 09:00: 


Phosphorus Level 2.7, Magnesium Level 2.0, Total Bilirubin 0.6, Direct Bilirubin

 0.2, Aspartate Amino Transf (AST/SGOT) 20, Alanine Aminotransferase (ALT/SGPT) 

41, Alkaline Phosphatase 53, Total Protein 4.4L, Albumin 1.3L


Height (Feet):  5


Height (Inches):  3.00


Weight (Pounds):  172


General Appearance:  no apparent distress


EENT:  other - On ventilator


Cardiovascular:  bradycardia


Respiratory/Chest:  decreased breath sounds


Abdomen:  distended











Lam Nino MD            Sep 28, 2020 11:56

## 2020-09-28 NOTE — NUR
CASE MANAGEMENT: REVIEW 



9/28/2020



SI;SEPSIS. PNA. 

96.5    47   26   109/78   97% ON MECH VENT FIO2 70 

H/H 9.5/27.6         BUN 23      CA+ 7.3   ALB 1.3 



IS:K-DUR GT BID

DOPAMINE IV PER PARAMETERS

LEVOPHED IV PER PARAMETERS

SOLU CORTEF IV Q8HR

MIDODRINE GT Q8HR 

LEVEMIR SQ BID

CHEST X-RAY- There is a sliver of a right apical pneumothorax, but adequate chest tube 
position. Recommend further monitoring.

 

Otherwise unchanged over one day

**ICU** 



PLAN OF CARE: 

MAINTAIN CHEST TUBE

## 2020-09-28 NOTE — INTERNAL MED PROGRESS NOTE
Subjective


Date of Service:  Sep 28, 2020


Physician Name


MaironSanya


Attending Physician


Chano Cherry MD





Current Medications








 Medications


  (Trade)  Dose


 Ordered  Sig/Didi


 Route


 PRN Reason  Start Time


 Stop Time Status Last Admin


Dose Admin


 


 Acetaminophen


  (Tylenol)  650 mg  Q4H  PRN


 GT


 For Pain  9/23/20 17:15


 10/23/20 17:14  9/28/20 13:04





 


 Chlorhexidine


 Gluconate


  (Beatrice-Hex 2%)  1 applic  DAILY@2000


 TOPIC


   8/31/20 20:00


 11/29/20 19:59  9/27/20 20:42





 


 Clotrimazole


  (Lotrimin)  1 applic  Q12HR


 TOPIC


   8/30/20 13:00


 11/28/20 12:59  9/28/20 08:38





 


 Dextrose


  (Dextrose 50%)  25 ml  Q30M  PRN


 IV


 Hypoglycemia  8/26/20 11:30


 11/20/20 11:29   





 


 Dextrose


  (Dextrose 50%)  50 ml  Q30M  PRN


 IV


 Hypoglycemia  8/26/20 11:30


 11/20/20 11:29   





 


 Hydrocortisone


  (Solu-CORTEF)  50 mg  EVERY 8  HOURS


 IV


   9/28/20 06:00


 12/27/20 05:59  9/28/20 13:04





 


 Insulin Aspart


  (NovoLOG)    Q6HR


 SUBQ


   9/18/20 12:00


 12/17/20 11:59  9/28/20 13:06





 


 Insulin Detemir


  (Levemir)  10 units  Q12HR


 SUBQ


   9/18/20 10:00


 12/17/20 09:59  9/28/20 08:40





 


 Loperamide HCl


  (Imodium)  2 mg  Q6H  PRN


 NG


 Diarrhea  9/16/20 10:30


 10/16/20 10:29  9/23/20 11:41





 


 Pantoprazole


  (Protonix)  40 mg  EVERY 12  HOURS


 IVP


   9/28/20 21:00


 10/28/20 20:59   





 


 Potassium Chloride


  (K-Dur)  40 meq  EVERY 12  HOURS


 GT


   9/26/20 21:00


 12/19/20 12:14  9/28/20 08:39











Allergies:  


Coded Allergies:  


     No Known Allergies (Unverified , 1/8/19)


ROS Limited/Unobtainable:  Yes


Subjective


60 YO F with Down's syndrome admitted with hypoxia.  Now sepsis and pneumonia.  

Cover for Int Med-DR Hung.  ICU.  Intubated and sedated





Objective





Last Vital Signs








  Date Time  Temp Pulse Resp B/P (MAP) Pulse Ox O2 Delivery O2 Flow Rate FiO2


 


9/28/20 19:10  50 24     70


 


9/28/20 19:00    123/73 (90) 98   


 


9/28/20 16:00      Mechanical Ventilator  





      Mechanical Ventilator  





      Mechanical Ventilator  


 


9/28/20 16:00 98.6       











Laboratory Tests








Test


 9/27/20


20:38 9/28/20


04:00 9/28/20


05:34 9/28/20


07:43


 


POC Whole Blood Glucose


 Pending  


 


 177 MG/DL


()  H 





 


White Blood Count


 


 6.7 K/UL


(4.8-10.8) 


 





 


Red Blood Count


 


 2.95 M/UL


(4.20-5.40)  L 


 





 


Hemoglobin


 


 9.5 G/DL


(12.0-16.0)  L 


 





 


Hematocrit


 


 27.6 %


(37.0-47.0)  L 


 





 


Mean Corpuscular Volume  93 FL (80-99)    


 


Mean Corpuscular Hemoglobin


 


 32.2 PG


(27.0-31.0)  H 


 





 


Mean Corpuscular Hemoglobin


Concent 


 34.4 G/DL


(32.0-36.0) 


 





 


Red Cell Distribution Width


 


 16.3 %


(11.6-14.8)  H 


 





 


Platelet Count


 


 115 K/UL


(150-450)  L 


 





 


Mean Platelet Volume


 


 9.5 FL


(6.5-10.1) 


 





 


Neutrophils (%) (Auto)


 


 % (45.0-75.0)


 


 





 


Lymphocytes (%) (Auto)


 


 % (20.0-45.0)


 


 





 


Monocytes (%) (Auto)   % (1.0-10.0)    


 


Eosinophils (%) (Auto)   % (0.0-3.0)    


 


Basophils (%) (Auto)   % (0.0-2.0)    


 


Sodium Level


 


 148 MMOL/L


(136-145)  H 


 





 


Potassium Level


 


 3.5 MMOL/L


(3.5-5.1) 


 





 


Chloride Level


 


 113 MMOL/L


()  H 


 





 


Carbon Dioxide Level


 


 30 MMOL/L


(21-32) 


 





 


Anion Gap


 


 6 mmol/L


(5-15) 


 





 


Blood Urea Nitrogen


 


 23 mg/dL


(7-18)  H 


 





 


Creatinine


 


 0.6 MG/DL


(0.55-1.30) 


 





 


Estimat Glomerular Filtration


Rate 


 > 60 mL/min


(>60) 


 





 


Glucose Level


 


 194 MG/DL


()  H 


 





 


Calcium Level


 


 7.3 MG/DL


(8.5-10.1)  L 


 





 


Arterial Blood pH


 


 


 


 7.524


(7.350-7.450)


 


Arterial Blood Partial


Pressure CO2 


 


 


 34.3 mmHg


(35.0-45.0)  L


 


Arterial Blood Partial


Pressure O2 


 


 


 87.2 mmHg


(75.0-100.0)


 


Arterial Blood HCO3


 


 


 


 27.6 mmol/L


(22.0-26.0)  H


 


Arterial Blood Oxygen


Saturation 


 


 


 96.1 %


()


 


Arterial Blood Base Excess    4.8 (-2-2)  H


 


Cole Test    Positive  


 


Test


 9/28/20


09:00 


 


 





 


Phosphorus Level


 2.7 MG/DL


(2.5-4.9) 


 


 





 


Magnesium Level


 2.0 MG/DL


(1.8-2.4) 


 


 





 


Total Bilirubin


 0.6 MG/DL


(0.2-1.0) 


 


 





 


Direct Bilirubin


 0.2 MG/DL


(0.0-0.3) 


 


 





 


Aspartate Amino Transf


(AST/SGOT) 20 U/L (15-37)


 


 


 





 


Alanine Aminotransferase


(ALT/SGPT) 41 U/L (12-78)


 


 


 





 


Alkaline Phosphatase


 53 U/L


() 


 


 





 


Total Protein


 4.4 G/DL


(6.4-8.2)  L 


 


 





 


Albumin


 1.3 G/DL


(3.4-5.0)  L 


 


 




















Intake and Output  


 


 9/27/20 9/28/20





 19:00 07:00


 


Intake Total 1140 ml 1000 ml


 


Output Total 1080 ml 1160 ml


 


Balance 60 ml -160 ml


 


  


 


Free Water 300 ml 350 ml


 


Tube Feeding 600 ml 600 ml


 


Other 240 ml 50 ml


 


Output Urine Total 1050 ml 1060 ml


 


Chest Tube Drainage Total 30 ml 100 ml


 


# Bowel Movements 3 2








Objective


General Appearance:  WD/WN, no apparent distress, alert


EENT:  PERRL/EOMI, normal ENT inspection


Neck:  non-tender, normal alignment, supple, normal inspection


Cardiovascular:  normal peripheral pulses, normal rate, regular rhythm, no 

gallop/murmur, no JVD


Respiratory/Chest:  Mech vent; decreased breath sounds, crackles/rales, rhonchi 

- bilaterally, expiratory wheezing


Abdomen:  normal bowel sounds, non tender, soft, no organomegaly, no mass


Extremities:  normal range of motion


Neurologic:  CNs II-XII grossly normal


Skin:  normal pigmentation, warm/dry





Assessment/Plan


Problem List:  


(1) HCAP (healthcare-associated pneumonia)


Assessment & Plan:  Strep Group G.  S/P amikacin per ID=Dr Gomes.  

Pulmonary/Critical care=DR Cherry.  COVID NEG





(2) Sepsis


Assessment & Plan:  Staph haemolyticus.  S/P amikacin per ID=Dr Gomes





(3) Down's syndrome


(4) Dysphagia


Assessment & Plan:  S/P PEG





(5) Seizure disorder


Assessment & Plan:  Continue keppra and depakote





(6) Hypothyroidism


Assessment & Plan:  Continue synthroid





(7) Acute respiratory failure


Assessment & Plan:  Pulmonary = Dr Cherry; [facility] Sanya Mace MD               Sep 28, 2020 19:19

## 2020-09-28 NOTE — INFECTIOUS DISEASES PROG NOTE
Assessment/Plan





ASSESSMENT:


sp code blue 8/26


Septic Shock; SP


Fever, recurrent- low grade 


Leukocytosis; persistent/fluctuating, SP


     -9/19 Bcx NTD


   -9/9 u/a no pyuria


   -9/8 Bcx NTD (Picc line)


   -9/6 Bcx NTD


            ucx Neg


             sp cx C. parapsilopsis


  -8/26 u/a no pyuria





Pneumonia.- COVID 19 neg x3


   Acute hypoxic resp failure on VM> NRB 15l 100%; hypoxic on ABG> now VDRF 

8/26- Fio2 80% >100% 8/28> 60% 9/1 >80% 9/2   >95% 9/10 >90% 9/14 >60% 9/15


R tension Pneumothroax sp CT 9/21


       -9/22 CXR: Interim complete reexpansion of right lung without evidence of

residual pneumothorax. Bilateral diffuse and extensive infiltrates. Markedly 

improved chest wall subcutaneous emphysema


       -9/21 CXR: Interim reexpansion of previously demonstrated right 

pneumothorax, status post large bore chest tube placement.


      -9/14 CXR: Improved aeration of both lungs.


       -9/5 CXR:Small bilateral pleural effusions with minor edema.  Stable 

edema with  mild worsening in the degree of pleural effusion on the right.


         -9/1 sp cx Neg


         8/31 CXR: Extensive bilateral interstitial and airspace disease appears

similar to the prior exam. Moderate to large bilateral pleural effusions appear 

unchanged.


   8/28 CXR: Increasing left upper lobe dense consolidation and likely 

increasing bilateral pleural fluid. Persistent diffuse dense consolidation el

sewhere


            -8/26 sp cx normal resp lilliam


             -8/25 CXR: Increased atelectasis of the right lung, since prior e

xam of 3 days earlier. New or increased right pleural effusion. Increased left 

basilar consolidation and/or pleural fluid 


          -COVID Rapid PCR neg 8/23, 8/23, 8/26


          -8/22 spc x Group G strep


          -8/22 CXR:   Reduced lung volumes.  Patchy bilateral predominantly 

interstitial  pulmonary opacities.  Could be from edema and/or pneumonia.  There

is a broader differential.


 


        -legionella ag urine, blasto ab, Histo ab, HIV ab screen, JOY, ANCA neg





Persistent, high grade bacteremia-


     -8/22 Bcx 4/4 sets S. haemolyticus; 8/23 Bcx 3/4 S/ epi; 8/27 Bcx 1.4 S. 

warnerri; 8/29 Bcx Neg


     -2d echo: no vegetaions seen





          ua/ wbc 10-15, nit neg, leuk +1; ucx Neg





DAVID; 


 -supratherapeutic vanco levels





-Seizure disorder.


- Hypothyroidism.


- Down syndrome.





History of PEG tube placement.


[facility]








PLAN:


Monitor off abx





          9/14 SP Meropenem #18, IV AMikacin #7


          9/4/20 SP Daptomycin #11


          9/2 SP MIcafungin #7, Linezolid #5


   8/28 SP Azithromycin #7/7


   8/26 SP Ceftriaxone #2


   8/25 SP IV Vancomycin #4, Zosyn #4


   8/22 SP Cefepime x1, Flagyl x1





- Monitor CBC, BMP.


.f/u cx


- COVID neg x3


- Monitor chest x-ray.


- Monitor the patient's clinical course and labs.  Based on those, we will do 

further recommendation.


-f/u Fungitell, asp ag, flow cytometry


-poor prognosis











Thank you, Dr. Cherry, for allowing me to participate in the care of


this patient.  I will follow the patient with you at this


hospitalization.








Discussed with RN





Subjective


Allergies:  


Coded Allergies:  


     No Known Allergies (Unverified , 1/8/19)





Afebrile


On Vent 70% O2 


No leukocytosis





Objective





Last 24 Hour Vital Signs








  Date Time  Temp Pulse Resp B/P (MAP) Pulse Ox O2 Delivery O2 Flow Rate FiO2


 


9/28/20 11:00  51 20 128/85 (99) 97   


 


9/28/20 10:30  49 24     70


 


9/28/20 10:00  48 22 138/77 (97) 96   


 


9/28/20 09:00  50 24 121/87 (98) 94   


 


9/28/20 08:03  51      


 


9/28/20 08:00      Mechanical Ventilator  





      Mechanical Ventilator  





      Mechanical Ventilator  


 


9/28/20 08:00 96.5 47 26 109/78 (88) 97   


 


9/28/20 08:00        70


 


9/28/20 07:33  52 24     70


 


9/28/20 07:00  48 23 94/74 (81) 97   


 


9/28/20 06:00  48 30 120/76 (91) 98   


 


9/28/20 05:00  50 24 106/74 (85) 96   


 


9/28/20 05:00  50 24 106/74 (85) 96   


 


9/28/20 04:00  56      


 


9/28/20 04:00 98.0 56 31 110/86 (94) 94   


 


9/28/20 04:00        70


 


9/28/20 04:00      Mechanical Ventilator  





      Mechanical Ventilator  





      Mechanical Ventilator  


 


9/28/20 04:00  56 31 110/86 (94) 94   


 


9/28/20 03:30  55 24     70


 


9/28/20 03:00  48 29 112/61 (78) 97   


 


9/28/20 03:00  48 29 112/61 (78) 97   


 


9/28/20 02:00  47 23 123/67 (85) 97   


 


9/28/20 02:00  50 24 123/67 (85) 97   


 


9/28/20 01:00  46 23 132/64 (86) 96   


 


9/28/20 01:00  46 23 132/64 (86) 96   


 


9/28/20 00:00      Mechanical Ventilator  





      Mechanical Ventilator  





      Mechanical Ventilator  


 


9/28/20 00:00  41 18 124/67 (86) 95   


 


9/28/20 00:00        70


 


9/28/20 00:00  40      


 


9/28/20 00:00 97.8 41 18 124/67 (86) 95   


 


9/27/20 23:00  49 25 121/58 (79) 95   


 


9/27/20 22:58  48 25     70


 


9/27/20 22:00  41 24 89/55 (66) 96   


 


9/27/20 21:00  43 30 123/90 (101) 98   


 


9/27/20 20:00 98.5 50 21 147/68 (94) 95   


 


9/27/20 20:00      Mechanical Ventilator  





      Mechanical Ventilator  





      Mechanical Ventilator  


 


9/27/20 20:00        70


 


9/27/20 20:00  47      


 


9/27/20 19:08  47 24     70


 


9/27/20 19:00  50 18 105/64 (78) 95   


 


9/27/20 18:00  47 18 105/64 (78) 95   


 


9/27/20 17:00 98.3 45 18 144/68 (93) 94   


 


9/27/20 16:00  45      


 


9/27/20 16:00  45 22 137/68 (91) 95   


 


9/27/20 16:00      Mechanical Ventilator  





      Mechanical Ventilator  





      Mechanical Ventilator  


 


9/27/20 16:00        70


 


9/27/20 15:05  47 24     60


 


9/27/20 15:00  41 15 153/82 (105) 95   


 


9/27/20 14:00  46 23 146/79 (101) 97   


 


9/27/20 13:00  53 16 147/95 (112) 94   


 


9/27/20 12:00        70


 


9/27/20 12:00      Mechanical Ventilator  





      Mechanical Ventilator  





      Mechanical Ventilator  


 


9/27/20 12:00  50 21 132/75 (94) 96   


 


9/27/20 12:00 98.5       


 


9/27/20 12:00  49      








Height (Feet):  5


Height (Inches):  3.00


Weight (Pounds):  172


GEN: NAD on Vent 70% O2


HEENT: NCAT, Intubated, 


Pulm: Equal chest rise and fall B/L, No accessory muscle use


ABD: Soft, ND


SKIN: Exposed skin with no rash, Normal in color





Laboratory Tests








Test


 9/27/20


12:58 9/27/20


16:46 9/27/20


20:38 9/28/20


04:00


 


POC Whole Blood Glucose


 167 MG/DL


()  H 151 MG/DL


()  H Pending  


 





 


White Blood Count


 


 


 


 6.7 K/UL


(4.8-10.8)


 


Red Blood Count


 


 


 


 2.95 M/UL


(4.20-5.40)  L


 


Hemoglobin


 


 


 


 9.5 G/DL


(12.0-16.0)  L


 


Hematocrit


 


 


 


 27.6 %


(37.0-47.0)  L


 


Mean Corpuscular Volume    93 FL (80-99)  


 


Mean Corpuscular Hemoglobin


 


 


 


 32.2 PG


(27.0-31.0)  H


 


Mean Corpuscular Hemoglobin


Concent 


 


 


 34.4 G/DL


(32.0-36.0)


 


Red Cell Distribution Width


 


 


 


 16.3 %


(11.6-14.8)  H


 


Platelet Count


 


 


 


 115 K/UL


(150-450)  L


 


Mean Platelet Volume


 


 


 


 9.5 FL


(6.5-10.1)


 


Neutrophils (%) (Auto)


 


 


 


 % (45.0-75.0)





 


Lymphocytes (%) (Auto)


 


 


 


 % (20.0-45.0)





 


Monocytes (%) (Auto)     % (1.0-10.0)  


 


Eosinophils (%) (Auto)     % (0.0-3.0)  


 


Basophils (%) (Auto)     % (0.0-2.0)  


 


Sodium Level


 


 


 


 148 MMOL/L


(136-145)  H


 


Potassium Level


 


 


 


 3.5 MMOL/L


(3.5-5.1)


 


Chloride Level


 


 


 


 113 MMOL/L


()  H


 


Carbon Dioxide Level


 


 


 


 30 MMOL/L


(21-32)


 


Anion Gap


 


 


 


 6 mmol/L


(5-15)


 


Blood Urea Nitrogen


 


 


 


 23 mg/dL


(7-18)  H


 


Creatinine


 


 


 


 0.6 MG/DL


(0.55-1.30)


 


Estimat Glomerular Filtration


Rate 


 


 


 > 60 mL/min


(>60)


 


Glucose Level


 


 


 


 194 MG/DL


()  H


 


Calcium Level


 


 


 


 7.3 MG/DL


(8.5-10.1)  L


 


Test


 9/28/20


05:34 9/28/20


07:43 9/28/20


09:00 





 


POC Whole Blood Glucose


 177 MG/DL


()  H 


 


 





 


Arterial Blood pH


 


 7.524


(7.350-7.450) 


 





 


Arterial Blood Partial


Pressure CO2 


 34.3 mmHg


(35.0-45.0)  L 


 





 


Arterial Blood Partial


Pressure O2 


 87.2 mmHg


(75.0-100.0) 


 





 


Arterial Blood HCO3


 


 27.6 mmol/L


(22.0-26.0)  H 


 





 


Arterial Blood Oxygen


Saturation 


 96.1 %


() 


 





 


Arterial Blood Base Excess  4.8 (-2-2)  H  


 


Cole Test  Positive    


 


Phosphorus Level


 


 


 2.7 MG/DL


(2.5-4.9) 





 


Magnesium Level


 


 


 2.0 MG/DL


(1.8-2.4) 





 


Total Bilirubin


 


 


 0.6 MG/DL


(0.2-1.0) 





 


Direct Bilirubin


 


 


 0.2 MG/DL


(0.0-0.3) 





 


Aspartate Amino Transf


(AST/SGOT) 


 


 20 U/L (15-37)


 





 


Alanine Aminotransferase


(ALT/SGPT) 


 


 41 U/L (12-78)


 





 


Alkaline Phosphatase


 


 


 53 U/L


() 





 


Total Protein


 


 


 4.4 G/DL


(6.4-8.2)  L 





 


Albumin


 


 


 1.3 G/DL


(3.4-5.0)  L 














Current Medications








 Medications


  (Trade)  Dose


 Ordered  Sig/Didi


 Route


 PRN Reason  Start Time


 Stop Time Status Last Admin


Dose Admin


 


 Acetaminophen


  (Tylenol)  650 mg  Q4H  PRN


 GT


 For Pain  9/23/20 17:15


 10/23/20 17:14  9/23/20 17:23





 


 Chlorhexidine


 Gluconate


  (Beatrice-Hex 2%)  1 applic  DAILY@2000


 TOPIC


   8/31/20 20:00


 11/29/20 19:59  9/27/20 20:42





 


 Clotrimazole


  (Lotrimin)  1 applic  Q12HR


 TOPIC


   8/30/20 13:00


 11/28/20 12:59  9/28/20 08:38





 


 Dextrose


  (Dextrose 50%)  25 ml  Q30M  PRN


 IV


 Hypoglycemia  8/26/20 11:30


 11/20/20 11:29   





 


 Dextrose


  (Dextrose 50%)  50 ml  Q30M  PRN


 IV


 Hypoglycemia  8/26/20 11:30


 11/20/20 11:29   





 


 Hydrocortisone


  (Solu-CORTEF)  50 mg  EVERY 8  HOURS


 IV


   9/28/20 06:00


 12/27/20 05:59  9/28/20 06:08





 


 Insulin Aspart


  (NovoLOG)    Q6HR


 SUBQ


   9/18/20 12:00


 12/17/20 11:59  9/28/20 05:43





 


 Insulin Detemir


  (Levemir)  10 units  Q12HR


 SUBQ


   9/18/20 10:00


 12/17/20 09:59  9/28/20 08:40





 


 Loperamide HCl


  (Imodium)  2 mg  Q6H  PRN


 NG


 Diarrhea  9/16/20 10:30


 10/16/20 10:29  9/23/20 11:41





 


 Potassium Chloride


  (K-Dur)  40 meq  EVERY 12  HOURS


 GT


   9/26/20 21:00


 12/19/20 12:14  9/28/20 08:39




















Elfego Mcmahon MD            Sep 28, 2020 11:58

## 2020-09-28 NOTE — NUR
RESPIRATORY NOTE:

Received Pt on AC 24, 500VT, 70%, no PEEP. Pt is intubated 7.5 @ 22cm lipline, secured by 
anchorfast. Pt awake/disoriented. B/S sushil. rhonchi, sxn small amounts of thick, tan-yellow 
secretions. Vent plugged into red outlet, Ambubag at bedside. Pt in no apparent distress at 
this time. Will continue to monitor pt.

## 2020-09-28 NOTE — NUR
NURSE NOTES:





Received patient from Roger CHANEL RN. Orally intubated, ET size of 7.5, 22 cm in the lip, 
mechanical ventilator dependent. Ventilator settings of AC 26, Tidal Volume 500, FiO2 of 
70%, no PEEP. On continuous GTF of Vital running at 50ml/hour. Left upper arm PICC noted. 
Valle cath inplace with yellow clear urine output noted in drainage bag by gravity. Right 
lateral chest tube remains in place, connected to low intermittent suction, with serous 
drainage noted in the pleurovac.  Contact isolation observed. Will continue plan of care.

## 2020-09-28 NOTE — NUR
NURSE HAND-OFF REPORT: 



Latest Vital Signs: Temperature 98.0 , Pulse 48 , B/P 94 /74 , Respiratory Rate 23 , O2 SAT 
97 , Mechanical Ventilator, O2 Flow Rate 15.0 .  

Vital Sign Comment: 



EKG Rhythm: Sinus Bradycardia

Rhythm change?: N 

MD Notified?: Y Jean-Pierre Carver MD Response: No New Orders Received



Latest Momin Fall Score: 70  

Fall Risk: High Risk 

Safety Measures: Call light Within Reach, Bed Alarm Zone 1, Side Rails Side Rails x3, Bed 
position Low and Locked.

Fall Precautions: 

Yellow Socks

Door Sign

Patient Fall Education



Report given to yamila ram using sbar .

## 2020-09-28 NOTE — NUR
NURSE NOTES:



Oral care provided. Tolerating current mechanical ventilator settings. Will continue to 
monitor.

## 2020-09-29 VITALS — DIASTOLIC BLOOD PRESSURE: 61 MMHG | SYSTOLIC BLOOD PRESSURE: 113 MMHG

## 2020-09-29 VITALS — SYSTOLIC BLOOD PRESSURE: 106 MMHG | DIASTOLIC BLOOD PRESSURE: 74 MMHG

## 2020-09-29 VITALS — SYSTOLIC BLOOD PRESSURE: 93 MMHG | DIASTOLIC BLOOD PRESSURE: 58 MMHG

## 2020-09-29 VITALS — DIASTOLIC BLOOD PRESSURE: 55 MMHG | SYSTOLIC BLOOD PRESSURE: 99 MMHG

## 2020-09-29 VITALS — DIASTOLIC BLOOD PRESSURE: 57 MMHG | SYSTOLIC BLOOD PRESSURE: 117 MMHG

## 2020-09-29 VITALS — DIASTOLIC BLOOD PRESSURE: 49 MMHG | SYSTOLIC BLOOD PRESSURE: 87 MMHG

## 2020-09-29 VITALS — DIASTOLIC BLOOD PRESSURE: 59 MMHG | SYSTOLIC BLOOD PRESSURE: 104 MMHG

## 2020-09-29 VITALS — DIASTOLIC BLOOD PRESSURE: 51 MMHG | SYSTOLIC BLOOD PRESSURE: 126 MMHG

## 2020-09-29 VITALS — SYSTOLIC BLOOD PRESSURE: 95 MMHG | DIASTOLIC BLOOD PRESSURE: 66 MMHG

## 2020-09-29 VITALS — DIASTOLIC BLOOD PRESSURE: 76 MMHG | SYSTOLIC BLOOD PRESSURE: 121 MMHG

## 2020-09-29 VITALS — SYSTOLIC BLOOD PRESSURE: 102 MMHG | DIASTOLIC BLOOD PRESSURE: 59 MMHG

## 2020-09-29 VITALS — DIASTOLIC BLOOD PRESSURE: 48 MMHG | SYSTOLIC BLOOD PRESSURE: 96 MMHG

## 2020-09-29 VITALS — SYSTOLIC BLOOD PRESSURE: 89 MMHG | DIASTOLIC BLOOD PRESSURE: 57 MMHG

## 2020-09-29 VITALS — SYSTOLIC BLOOD PRESSURE: 128 MMHG | DIASTOLIC BLOOD PRESSURE: 55 MMHG

## 2020-09-29 VITALS — SYSTOLIC BLOOD PRESSURE: 122 MMHG | DIASTOLIC BLOOD PRESSURE: 79 MMHG

## 2020-09-29 VITALS — DIASTOLIC BLOOD PRESSURE: 56 MMHG | SYSTOLIC BLOOD PRESSURE: 101 MMHG

## 2020-09-29 VITALS — SYSTOLIC BLOOD PRESSURE: 133 MMHG | DIASTOLIC BLOOD PRESSURE: 49 MMHG

## 2020-09-29 VITALS — DIASTOLIC BLOOD PRESSURE: 55 MMHG | SYSTOLIC BLOOD PRESSURE: 129 MMHG

## 2020-09-29 VITALS — DIASTOLIC BLOOD PRESSURE: 60 MMHG | SYSTOLIC BLOOD PRESSURE: 111 MMHG

## 2020-09-29 VITALS — DIASTOLIC BLOOD PRESSURE: 89 MMHG | SYSTOLIC BLOOD PRESSURE: 118 MMHG

## 2020-09-29 VITALS — SYSTOLIC BLOOD PRESSURE: 99 MMHG | DIASTOLIC BLOOD PRESSURE: 51 MMHG

## 2020-09-29 VITALS — SYSTOLIC BLOOD PRESSURE: 102 MMHG | DIASTOLIC BLOOD PRESSURE: 57 MMHG

## 2020-09-29 VITALS — SYSTOLIC BLOOD PRESSURE: 116 MMHG | DIASTOLIC BLOOD PRESSURE: 61 MMHG

## 2020-09-29 VITALS — SYSTOLIC BLOOD PRESSURE: 117 MMHG | DIASTOLIC BLOOD PRESSURE: 52 MMHG

## 2020-09-29 LAB
ADD MANUAL DIFF: NO
ALBUMIN SERPL-MCNC: 1.2 G/DL (ref 3.4–5)
ALBUMIN/GLOB SERPL: 0.4 {RATIO} (ref 1–2.7)
ALP SERPL-CCNC: 52 U/L (ref 46–116)
ALT SERPL-CCNC: 30 U/L (ref 12–78)
ANION GAP SERPL CALC-SCNC: 7 MMOL/L (ref 5–15)
AST SERPL-CCNC: 22 U/L (ref 15–37)
BASOPHILS NFR BLD AUTO: 0.5 % (ref 0–2)
BILIRUB SERPL-MCNC: 0.6 MG/DL (ref 0.2–1)
BUN SERPL-MCNC: 22 MG/DL (ref 7–18)
CALCIUM SERPL-MCNC: 7.1 MG/DL (ref 8.5–10.1)
CHLORIDE SERPL-SCNC: 112 MMOL/L (ref 98–107)
CO2 SERPL-SCNC: 27 MMOL/L (ref 21–32)
CREAT SERPL-MCNC: 0.6 MG/DL (ref 0.55–1.3)
EOSINOPHIL NFR BLD AUTO: 0.3 % (ref 0–3)
ERYTHROCYTE [DISTWIDTH] IN BLOOD BY AUTOMATED COUNT: 16.7 % (ref 11.6–14.8)
GLOBULIN SER-MCNC: 3 G/DL
HCT VFR BLD CALC: 26.3 % (ref 37–47)
HGB BLD-MCNC: 8.8 G/DL (ref 12–16)
LYMPHOCYTES NFR BLD AUTO: 13.4 % (ref 20–45)
MCV RBC AUTO: 94 FL (ref 80–99)
MONOCYTES NFR BLD AUTO: 8.1 % (ref 1–10)
NEUTROPHILS NFR BLD AUTO: 77.8 % (ref 45–75)
PLATELET # BLD: 104 K/UL (ref 150–450)
POTASSIUM SERPL-SCNC: 3.1 MMOL/L (ref 3.5–5.1)
RBC # BLD AUTO: 2.8 M/UL (ref 4.2–5.4)
SODIUM SERPL-SCNC: 146 MMOL/L (ref 136–145)
WBC # BLD AUTO: 5 K/UL (ref 4.8–10.8)

## 2020-09-29 RX ADMIN — PANTOPRAZOLE SODIUM SCH MG: 40 INJECTION, POWDER, FOR SOLUTION INTRAVENOUS at 08:56

## 2020-09-29 RX ADMIN — INSULIN ASPART SCH UNITS: 100 INJECTION, SOLUTION INTRAVENOUS; SUBCUTANEOUS at 06:08

## 2020-09-29 RX ADMIN — INSULIN ASPART SCH UNITS: 100 INJECTION, SOLUTION INTRAVENOUS; SUBCUTANEOUS at 12:09

## 2020-09-29 RX ADMIN — HYDROCORTISONE SODIUM SUCCINATE SCH MG: 100 INJECTION, POWDER, FOR SOLUTION INTRAMUSCULAR; INTRAVENOUS at 05:50

## 2020-09-29 RX ADMIN — HYDROCORTISONE SODIUM SUCCINATE SCH MG: 100 INJECTION, POWDER, FOR SOLUTION INTRAMUSCULAR; INTRAVENOUS at 22:13

## 2020-09-29 RX ADMIN — INSULIN ASPART SCH UNITS: 100 INJECTION, SOLUTION INTRAVENOUS; SUBCUTANEOUS at 00:05

## 2020-09-29 RX ADMIN — PANTOPRAZOLE SODIUM SCH MG: 40 INJECTION, POWDER, FOR SOLUTION INTRAVENOUS at 20:35

## 2020-09-29 RX ADMIN — ACETAMINOPHEN PRN MG: 160 SOLUTION ORAL at 17:33

## 2020-09-29 RX ADMIN — INSULIN DETEMIR SCH UNITS: 100 INJECTION, SOLUTION SUBCUTANEOUS at 08:59

## 2020-09-29 RX ADMIN — INSULIN DETEMIR SCH UNITS: 100 INJECTION, SOLUTION SUBCUTANEOUS at 20:36

## 2020-09-29 RX ADMIN — INSULIN ASPART SCH UNITS: 100 INJECTION, SOLUTION INTRAVENOUS; SUBCUTANEOUS at 17:27

## 2020-09-29 RX ADMIN — HYDROCORTISONE SODIUM SUCCINATE SCH MG: 100 INJECTION, POWDER, FOR SOLUTION INTRAMUSCULAR; INTRAVENOUS at 13:22

## 2020-09-29 RX ADMIN — INSULIN ASPART SCH UNITS: 100 INJECTION, SOLUTION INTRAVENOUS; SUBCUTANEOUS at 23:35

## 2020-09-29 RX ADMIN — CHLORHEXIDINE GLUCONATE SCH APPLIC: 213 SOLUTION TOPICAL at 20:34

## 2020-09-29 NOTE — NUR
NURSE NOTES:

Nursing supervisor was able to get a hold of Jo Ann at this time. MD will call ER MD for 
reintubation.

## 2020-09-29 NOTE — NUR
NOTE



SW spoke w/ pt's , Kaylan Jo [phone] that she will talk to the medical 
team at [facility] on the next day and find out their policy on 
addressing code status. SW will continue to F/U.

## 2020-09-29 NOTE — NUR
NURSE NOTES:

Dr Cherry observed patient bedside.  Spoke to MD regarding POC.  Per MD, original POC is to 
be full code.  Able to wean only very slowly.  Patient is under conservator and at time of 
admission SW spoke to conservator that requested FULL CODE status.  At this time, patient is 
with decreased mentation, less arousable.  Patient opens eyes to voice however,  appears 
lethargic.  Bioethics was discussed with Dr. Cherry in which he agreed that it was 
necessary at this time for possible End of Life issues in the event that patient was to 
suffer another cardiac arrest given that patient has already done so once before.  Bioethics 
consults has been placed per the MD.  Will continue to monitor and will contact AUDREY to follow 
up at the request of the MD.

## 2020-09-29 NOTE — NUR
NURSE HAND-OFF REPORT: 



Latest Vital Signs: Temperature 98.4 , Pulse 41 , B/P 116 /61 , Respiratory Rate 26 , O2 SAT 
99 , Mechanical Ventilator, O2 Flow Rate 15.0 .  

Vital Sign Comment: 



EKG Rhythm: Sinus Bradycardia

Rhythm change?: N 

MD Response: No New Orders Received



Latest Momin Fall Score: 70  

Fall Risk: High Risk 

Safety Measures: Call light Within Reach, Bed Alarm Zone 1, Side Rails Side Rails x3, Bed 
position Low and Locked.

Fall Precautions: 

Yellow Socks

Door Sign

Patient Fall Education



Report given to SPRING ROMO.

## 2020-09-29 NOTE — INFECTIOUS DISEASES PROG NOTE
Assessment/Plan





ASSESSMENT:


sp code blue 8/26


Septic Shock; SP


Fever, recurrent- low grade 


Leukocytosis; persistent/fluctuating, SP


     -9/19 Bcx NTD


   -9/9 u/a no pyuria


   -9/8 Bcx NTD (Picc line)


   -9/6 Bcx NTD


            ucx Neg


             sp cx C. parapsilopsis


  -8/26 u/a no pyuria





Pneumonia.- COVID 19 neg x3


   Acute hypoxic resp failure on VM> NRB 15l 100%; hypoxic on ABG> now VDRF 

8/26- Fio2 80% >100% 8/28> 60% 9/1 >80% 9/2   >95% 9/10 >90% 9/14 >60% 9/15


R tension Pneumothroax sp CT 9/21


       -9/22 CXR: Interim complete reexpansion of right lung without evidence of

residual pneumothorax. Bilateral diffuse and extensive infiltrates. Markedly 

improved chest wall subcutaneous emphysema


       -9/21 CXR: Interim reexpansion of previously demonstrated right 

pneumothorax, status post large bore chest tube placement.


      -9/14 CXR: Improved aeration of both lungs.


       -9/5 CXR:Small bilateral pleural effusions with minor edema.  Stable 

edema with  mild worsening in the degree of pleural effusion on the right.


         -9/1 sp cx Neg


         8/31 CXR: Extensive bilateral interstitial and airspace disease appears

similar to the prior exam. Moderate to large bilateral pleural effusions appear 

unchanged.


   8/28 CXR: Increasing left upper lobe dense consolidation and likely 

increasing bilateral pleural fluid. Persistent diffuse dense consolidation el

sewhere


            -8/26 sp cx normal resp lilliam


             -8/25 CXR: Increased atelectasis of the right lung, since prior e

xam of 3 days earlier. New or increased right pleural effusion. Increased left 

basilar consolidation and/or pleural fluid 


          -COVID Rapid PCR neg 8/23, 8/23, 8/26


          -8/22 spc x Group G strep


          -8/22 CXR:   Reduced lung volumes.  Patchy bilateral predominantly 

interstitial  pulmonary opacities.  Could be from edema and/or pneumonia.  There

is a broader differential.


 


        -legionella ag urine, blasto ab, Histo ab, HIV ab screen, JOY, ANCA neg





Persistent, high grade bacteremia-


     -8/22 Bcx 4/4 sets S. haemolyticus; 8/23 Bcx 3/4 S/ epi; 8/27 Bcx 1.4 S. 

warnerri; 8/29 Bcx Neg


     -2d echo: no vegetaions seen





          ua/ wbc 10-15, nit neg, leuk +1; ucx Neg





DAVID; 


 -supratherapeutic vanco levels





-Seizure disorder.


- Hypothyroidism.


- Down syndrome.





History of PEG tube placement.


[facility]








PLAN:


Continue to monitor off abx





          9/14 SP Meropenem #18, IV Amikacin #7


          9/4/20 SP Daptomycin #11


          9/2 SP MIcafungin #7, Linezolid #5


   8/28 SP Azithromycin #7/7


   8/26 SP Ceftriaxone #2


   8/25 SP IV Vancomycin #4, Zosyn #4


   8/22 SP Cefepime x1, Flagyl x1





- Monitor CBC, BMP.


.f/u cx


- COVID neg x3


- Monitor chest x-ray.


- Monitor the patient's clinical course and labs.  Based on those, we will do 

further recommendation.


-f/u Fungitell, asp ag, flow cytometry


-poor prognosis











Thank you, Dr. Cherry, for allowing me to participate in the care of


this patient.  I will follow the patient with you at this


hospitalization.








Discussed with RN





Subjective


Allergies:  


Coded Allergies:  


     No Known Allergies (Unverified , 1/8/19)





Afebrile


On Vent 60% O2 


No leukocytosis





Objective





Last 24 Hour Vital Signs








  Date Time  Temp Pulse Resp B/P (MAP) Pulse Ox O2 Delivery O2 Flow Rate FiO2


 


9/29/20 11:17        60


 


9/29/20 11:16  59 24     70


 


9/29/20 11:00  55 24 104/59 (74) 99   


 


9/29/20 10:00  54 25 101/56 (71) 99   


 


9/29/20 09:00  54 22 93/58 (70) 98   


 


9/29/20 08:00 98.3 61 24 118/89 (99) 100   


 


9/29/20 08:00      Mechanical Ventilator  





      Mechanical Ventilator  





      Mechanical Ventilator  


 


9/29/20 08:00        70


 


9/29/20 07:57  60 24     70


 


9/29/20 07:41  42      


 


9/29/20 07:00  39 24 111/60 (77) 99   


 


9/29/20 06:00  41 26 116/61 (79) 99   


 


9/29/20 05:00  45 30 96/48 (64) 97   


 


9/29/20 04:00  45      


 


9/29/20 04:00 98.4 57 30 117/52 (73) 97   


 


9/29/20 04:00        70


 


9/29/20 04:00      Mechanical Ventilator  





      Mechanical Ventilator  





      Mechanical Ventilator  


 


9/29/20 03:32  60 24     70


 


9/29/20 03:00  61 26 106/74 (85) 94   


 


9/29/20 02:00  42 26 87/49 (62) 97   


 


9/29/20 01:00  44 24 89/57 (68) 97   


 


9/29/20 00:00        70


 


9/29/20 00:00      Mechanical Ventilator  





      Mechanical Ventilator  





      Mechanical Ventilator  


 


9/29/20 00:00  54      


 


9/29/20 00:00 98.6 50 29 113/61 (78) 98   


 


9/28/20 23:04  58 24     70


 


9/28/20 23:00  54 20 116/62 (80) 97   


 


9/28/20 22:00  50 26 114/66 (82) 98   


 


9/28/20 21:00  53 26 101/41 (61) 98   


 


9/28/20 20:00  50      


 


9/28/20 20:00 98.5 48 27 108/52 (70) 97   


 


9/28/20 20:00        70


 


9/28/20 20:00      Mechanical Ventilator  





      Mechanical Ventilator  





      Mechanical Ventilator  


 


9/28/20 19:10  50 24     70


 


9/28/20 19:00  49 22 123/73 (90) 98   


 


9/28/20 18:00  49 28 124/77 (93) 100   


 


9/28/20 17:00  46 25 103/78 (86) 99   


 


9/28/20 16:00      Mechanical Ventilator  





      Mechanical Ventilator  





      Mechanical Ventilator  


 


9/28/20 16:00  48      


 


9/28/20 16:00 98.6 49 25 107/60 (76) 98   


 


9/28/20 16:00        70


 


9/28/20 15:00  69 16 124/74 (91) 98   


 


9/28/20 14:40  49 24     70


 


9/28/20 14:00  52 23 116/74 (88) 98   


 


9/28/20 13:00  53 24 126/63 (84) 97   


 


9/28/20 12:00        70


 


9/28/20 12:00      Mechanical Ventilator  





      Mechanical Ventilator  





      Mechanical Ventilator  


 


9/28/20 12:00 98.6 51 21 131/57 (81) 97   








Height (Feet):  5


Height (Inches):  3.00


Weight (Pounds):  172


GEN: NAD on Vent 60% O2


HEENT: NCAT, Intubated, 


Pulm: Equal chest rise and fall B/L, No accessory muscle use


ABD: Soft, ND


SKIN: Exposed skin with no rash, Normal in color





Laboratory Tests








Test


 9/28/20


17:52 9/28/20


20:43 9/29/20


00:02 9/29/20


04:40


 


POC Whole Blood Glucose


 112 MG/DL


()  H Pending  


 Pending  


 





 


White Blood Count


 


 


 


 5.0 K/UL


(4.8-10.8)


 


Red Blood Count


 


 


 


 2.80 M/UL


(4.20-5.40)  L


 


Hemoglobin


 


 


 


 8.8 G/DL


(12.0-16.0)  L


 


Hematocrit


 


 


 


 26.3 %


(37.0-47.0)  L


 


Mean Corpuscular Volume    94 FL (80-99)  


 


Mean Corpuscular Hemoglobin


 


 


 


 31.7 PG


(27.0-31.0)  H


 


Mean Corpuscular Hemoglobin


Concent 


 


 


 33.7 G/DL


(32.0-36.0)


 


Red Cell Distribution Width


 


 


 


 16.7 %


(11.6-14.8)  H


 


Platelet Count


 


 


 


 104 K/UL


(150-450)  L


 


Mean Platelet Volume


 


 


 


 8.5 FL


(6.5-10.1)


 


Neutrophils (%) (Auto)


 


 


 


 77.8 %


(45.0-75.0)  H


 


Lymphocytes (%) (Auto)


 


 


 


 13.4 %


(20.0-45.0)  L


 


Monocytes (%) (Auto)


 


 


 


 8.1 %


(1.0-10.0)


 


Eosinophils (%) (Auto)


 


 


 


 0.3 %


(0.0-3.0)


 


Basophils (%) (Auto)


 


 


 


 0.5 %


(0.0-2.0)


 


Sodium Level


 


 


 


 146 MMOL/L


(136-145)  H


 


Potassium Level


 


 


 


 3.1 MMOL/L


(3.5-5.1)  L


 


Chloride Level


 


 


 


 112 MMOL/L


()  H


 


Carbon Dioxide Level


 


 


 


 27 MMOL/L


(21-32)


 


Anion Gap


 


 


 


 7 mmol/L


(5-15)


 


Blood Urea Nitrogen


 


 


 


 22 mg/dL


(7-18)  H


 


Creatinine


 


 


 


 0.6 MG/DL


(0.55-1.30)


 


Estimat Glomerular Filtration


Rate 


 


 


 > 60 mL/min


(>60)


 


Glucose Level


 


 


 


 170 MG/DL


()  H


 


Calcium Level


 


 


 


 7.1 MG/DL


(8.5-10.1)  L


 


Total Bilirubin


 


 


 


 0.6 MG/DL


(0.2-1.0)


 


Aspartate Amino Transf


(AST/SGOT) 


 


 


 22 U/L (15-37)





 


Alanine Aminotransferase


(ALT/SGPT) 


 


 


 30 U/L (12-78)





 


Alkaline Phosphatase


 


 


 


 52 U/L


()


 


Pro-B-Type Natriuretic Peptide


 


 


 


 3540 pg/mL


(0-125)  H


 


Total Protein


 


 


 


 4.2 G/DL


(6.4-8.2)  L


 


Albumin


 


 


 


 1.2 G/DL


(3.4-5.0)  L


 


Globulin    3.0 g/dL  


 


Albumin/Globulin Ratio


 


 


 


 0.4 (1.0-2.7)


L


 


Thyroid Stimulating Hormone


(TSH) 


 


 


 3.345 uiU/mL


(0.358-3.740)


 


Test


 9/29/20


05:30 9/29/20


08:55 


 





 


POC Whole Blood Glucose Pending   Pending    











Current Medications








 Medications


  (Trade)  Dose


 Ordered  Sig/Didi


 Route


 PRN Reason  Start Time


 Stop Time Status Last Admin


Dose Admin


 


 Acetaminophen


  (Tylenol)  650 mg  Q4H  PRN


 GT


 For Pain  9/23/20 17:15


 10/23/20 17:14  9/28/20 13:04





 


 Chlorhexidine


 Gluconate


  (Beatrice-Hex 2%)  1 applic  DAILY@2000


 TOPIC


   8/31/20 20:00


 11/29/20 19:59  9/28/20 20:51





 


 Clotrimazole


  (Lotrimin)  1 applic  Q12HR


 TOPIC


   8/30/20 13:00


 11/28/20 12:59  9/29/20 08:56





 


 Dextrose


  (Dextrose 50%)  25 ml  Q30M  PRN


 IV


 Hypoglycemia  8/26/20 11:30


 11/20/20 11:29   





 


 Dextrose


  (Dextrose 50%)  50 ml  Q30M  PRN


 IV


 Hypoglycemia  8/26/20 11:30


 11/20/20 11:29   





 


 Hydrocortisone


  (Solu-CORTEF)  50 mg  EVERY 8  HOURS


 IV


   9/28/20 06:00


 12/27/20 05:59  9/29/20 05:50





 


 Insulin Aspart


  (NovoLOG)    Q6HR


 SUBQ


   9/18/20 12:00


 12/17/20 11:59  9/29/20 06:08





 


 Insulin Detemir


  (Levemir)  10 units  Q12HR


 SUBQ


   9/18/20 10:00


 12/17/20 09:59  9/29/20 08:59





 


 Loperamide HCl


  (Imodium)  2 mg  Q6H  PRN


 NG


 Diarrhea  9/16/20 10:30


 10/16/20 10:29  9/23/20 11:41





 


 Pantoprazole


  (Protonix)  40 mg  EVERY 12  HOURS


 IVP


   9/28/20 21:00


 10/28/20 20:59  9/29/20 08:56





 


 Potassium Chloride


  (K-Dur)  40 meq  EVERY 12  HOURS


 GT


   9/26/20 21:00


 12/19/20 12:14  9/29/20 08:56




















Elfego Mcmahon MD            Sep 29, 2020 11:49

## 2020-09-29 NOTE — PULMONOLGY CRITICAL CARE NOTE
Critical Care - Asmt/Plan


Problems:  


(1) Acute respiratory failure


(2) Pneumothorax


(3) Bacteremia


(4) Pneumonia


(5) Sepsis


(6) HCAP (healthcare-associated pneumonia)


(7) Seizure disorder


(8) Down's syndrome


(9) Trisomy 21, Down syndrome


Respiratory:  monitor respiratory rate, adjust FIO2, CXR


Cardiac:  continue pressors, continue to monitor HR/BP


Renal:  F/U I&O


Infectious Disease:  check cultures


Gastrointestinal:  continue feedings/current rate


Endocrine:  monitor blood sugar, continue sliding scale insulin


Hematologic:  transfuse if hgb<8.5


Neurologic:  PRN Ativan, keep patient comfortable


Affect:  PRN ativan


Disposition:  keep in ICU


Notes Reviewed:  internist


Discussed with:  nurses, consultants, 





Critical Care - Objective





Last 24 Hour Vital Signs








  Date Time  Temp Pulse Resp B/P (MAP) Pulse Ox O2 Delivery O2 Flow Rate FiO2


 


9/29/20 11:17        60


 


9/29/20 11:16  59 24     70


 


9/29/20 11:00  55 24 104/59 (74) 99   


 


9/29/20 10:00  54 25 101/56 (71) 99   


 


9/29/20 09:00  54 22 93/58 (70) 98   


 


9/29/20 08:00 98.3 61 24 118/89 (99) 100   


 


9/29/20 08:00      Mechanical Ventilator  





      Mechanical Ventilator  





      Mechanical Ventilator  


 


9/29/20 08:00        70


 


9/29/20 07:57  60 24     70


 


9/29/20 07:41  42      


 


9/29/20 07:00  39 24 111/60 (77) 99   


 


9/29/20 06:00  41 26 116/61 (79) 99   


 


9/29/20 05:00  45 30 96/48 (64) 97   


 


9/29/20 04:00  45      


 


9/29/20 04:00 98.4 57 30 117/52 (73) 97   


 


9/29/20 04:00        70


 


9/29/20 04:00      Mechanical Ventilator  





      Mechanical Ventilator  





      Mechanical Ventilator  


 


9/29/20 03:32  60 24     70


 


9/29/20 03:00  61 26 106/74 (85) 94   


 


9/29/20 02:00  42 26 87/49 (62) 97   


 


9/29/20 01:00  44 24 89/57 (68) 97   


 


9/29/20 00:00        70


 


9/29/20 00:00      Mechanical Ventilator  





      Mechanical Ventilator  





      Mechanical Ventilator  


 


9/29/20 00:00  54      


 


9/29/20 00:00 98.6 50 29 113/61 (78) 98   


 


9/28/20 23:04  58 24     70


 


9/28/20 23:00  54 20 116/62 (80) 97   


 


9/28/20 22:00  50 26 114/66 (82) 98   


 


9/28/20 21:00  53 26 101/41 (61) 98   


 


9/28/20 20:00  50      


 


9/28/20 20:00 98.5 48 27 108/52 (70) 97   


 


9/28/20 20:00        70


 


9/28/20 20:00      Mechanical Ventilator  





      Mechanical Ventilator  





      Mechanical Ventilator  


 


9/28/20 19:10  50 24     70


 


9/28/20 19:00  49 22 123/73 (90) 98   


 


9/28/20 18:00  49 28 124/77 (93) 100   


 


9/28/20 17:00  46 25 103/78 (86) 99   


 


9/28/20 16:00      Mechanical Ventilator  





      Mechanical Ventilator  





      Mechanical Ventilator  


 


9/28/20 16:00  48      


 


9/28/20 16:00 98.6 49 25 107/60 (76) 98   


 


9/28/20 16:00        70


 


9/28/20 15:00  69 16 124/74 (91) 98   


 


9/28/20 14:40  49 24     70


 


9/28/20 14:00  52 23 116/74 (88) 98   


 


9/28/20 13:00  53 24 126/63 (84) 97   








Status:  sedated


Condition:  improving


HEENT:  atraumatic


Lungs:  chest wall tender


Heart:  HR/BP stable


Abdomen:  soft


Extremities:  no C/C/E


Accucheck:  185





Critical Care - Subjective


ROS Limited/Unobtainable:  Yes


Condition:  critical


EKG Rhythm:  Sinus Rhythm


FI02:  60


Vent Support Breath Rate:  24


Vent Support Mode:  AC


Vent Tidal Volume:  500


Sputum Amount:  Moderate


PEEP:  0.0


PIP:  33


Tube Feeding Amount:  50


I&O:











Intake and Output  


 


 9/28/20 9/29/20





 19:00 07:00


 


Intake Total 760 ml 800 ml


 


Output Total 765 ml 831 ml


 


Balance -5 ml -31 ml


 


  


 


Free Water  200 ml


 


Tube Feeding 600 ml 500 ml


 


Blood Product  100 ml


 


Other 160 ml 


 


Output Urine Total 705 ml 785 ml


 


Stool Total  1 ml


 


Chest Tube Drainage Total  45 ml


 


Drainage Total 60 ml 


 


# Bowel Movements 2 3








CXR:


extensive bilateral infiltrate.


ET-Tube:  7.5


ET Position:  22


Labs:





Laboratory Tests








Test


 9/28/20


17:52 9/28/20


20:43 9/29/20


00:02 9/29/20


04:40


 


POC Whole Blood Glucose


 112 MG/DL


()  H Pending  


 Pending  


 





 


White Blood Count


 


 


 


 5.0 K/UL


(4.8-10.8)


 


Red Blood Count


 


 


 


 2.80 M/UL


(4.20-5.40)  L


 


Hemoglobin


 


 


 


 8.8 G/DL


(12.0-16.0)  L


 


Hematocrit


 


 


 


 26.3 %


(37.0-47.0)  L


 


Mean Corpuscular Volume    94 FL (80-99)  


 


Mean Corpuscular Hemoglobin


 


 


 


 31.7 PG


(27.0-31.0)  H


 


Mean Corpuscular Hemoglobin


Concent 


 


 


 33.7 G/DL


(32.0-36.0)


 


Red Cell Distribution Width


 


 


 


 16.7 %


(11.6-14.8)  H


 


Platelet Count


 


 


 


 104 K/UL


(150-450)  L


 


Mean Platelet Volume


 


 


 


 8.5 FL


(6.5-10.1)


 


Neutrophils (%) (Auto)


 


 


 


 77.8 %


(45.0-75.0)  H


 


Lymphocytes (%) (Auto)


 


 


 


 13.4 %


(20.0-45.0)  L


 


Monocytes (%) (Auto)


 


 


 


 8.1 %


(1.0-10.0)


 


Eosinophils (%) (Auto)


 


 


 


 0.3 %


(0.0-3.0)


 


Basophils (%) (Auto)


 


 


 


 0.5 %


(0.0-2.0)


 


Sodium Level


 


 


 


 146 MMOL/L


(136-145)  H


 


Potassium Level


 


 


 


 3.1 MMOL/L


(3.5-5.1)  L


 


Chloride Level


 


 


 


 112 MMOL/L


()  H


 


Carbon Dioxide Level


 


 


 


 27 MMOL/L


(21-32)


 


Anion Gap


 


 


 


 7 mmol/L


(5-15)


 


Blood Urea Nitrogen


 


 


 


 22 mg/dL


(7-18)  H


 


Creatinine


 


 


 


 0.6 MG/DL


(0.55-1.30)


 


Estimat Glomerular Filtration


Rate 


 


 


 > 60 mL/min


(>60)


 


Glucose Level


 


 


 


 170 MG/DL


()  H


 


Calcium Level


 


 


 


 7.1 MG/DL


(8.5-10.1)  L


 


Total Bilirubin


 


 


 


 0.6 MG/DL


(0.2-1.0)


 


Aspartate Amino Transf


(AST/SGOT) 


 


 


 22 U/L (15-37)





 


Alanine Aminotransferase


(ALT/SGPT) 


 


 


 30 U/L (12-78)





 


Alkaline Phosphatase


 


 


 


 52 U/L


()


 


Pro-B-Type Natriuretic Peptide


 


 


 


 3540 pg/mL


(0-125)  H


 


Total Protein


 


 


 


 4.2 G/DL


(6.4-8.2)  L


 


Albumin


 


 


 


 1.2 G/DL


(3.4-5.0)  L


 


Globulin    3.0 g/dL  


 


Albumin/Globulin Ratio


 


 


 


 0.4 (1.0-2.7)


L


 


Thyroid Stimulating Hormone


(TSH) 


 


 


 3.345 uiU/mL


(0.358-3.740)


 


Test


 9/29/20


05:30 9/29/20


08:55 9/29/20


12:06 





 


POC Whole Blood Glucose


 Pending  


 Pending  


 185 MG/DL


()  H 




















Chano Cherry MD              Sep 29, 2020 12:11

## 2020-09-29 NOTE — DIAGNOSTIC IMAGING REPORT
Indication: Reason For Exam: [id number]

 

Technique:  Single AP view of the chest.

 

Comparison: Chest dated 9/20/2020

 

Findings:

 

The cardiomediastinal silhouette is unchanged in appearance. Redemonstration of

bilateral diffuse severe airspace disease. Increasing right pneumothorax. No

increasing pleural effusion.

 

Interval retraction of right-sided chest tube. Unchanged endotracheal tube and left

PICC.

 

IMPRESSION:

 

Interval retraction of right-sided chest tube with increasing right pneumothorax.

Repositioning of chest tube is recommended.

 

Findings reported to treating nurse Oneil at 1345 on 9/29/2020

## 2020-09-29 NOTE — NUR
NURSE NOTES:

received from star rn pt orally intubated -vent 02 sat 100% NO ACUTE RESP DISTRESS CHEST 
TUBE-FLRA-VAC TO CONT SUCTION  PT AWAKE DO NOT FOLLOWS COMMAND  MOVING UPPER LIFT EXT  
TOLERATING TUBE FEEDING NO RESIDUAL REPOSITION AND SUCTION IV INFUSING WELL

## 2020-09-29 NOTE — NUR
NURSE NOTES:

Called and left message for MD Cherry at this time. patient noted with low Tidal Volumes. 
RT at the beside. Awaiting Call back

## 2020-09-29 NOTE — NUR
NURSE NOTES:

Given Solu Cortef 50mg IV as ordered. Solu Cortef vial was 100mg and 50mg was wasted. But 
accidently entered waste medication 2 times on the Pyxis. Will inform to pharmacy in the 
morning.

## 2020-09-29 NOTE — NUR
NURSE HAND-OFF REPORT: 



Latest Vital Signs: Temperature 98.0 , Pulse 61 , B/P 99 /51 , Respiratory Rate 20 , O2 SAT 
99 , Mechanical Ventilator, O2 Flow Rate 15.0 .  

Vital Sign Comment: right chest tube connected to low continuous suction.



EKG Rhythm: Sinus Rhythm

Rhythm change?: N 





Latest Momin Fall Score: 70  

Fall Risk: High Risk 

Safety Measures: Call light Within Reach, Bed Alarm Zone 1, Side Rails Side Rails x3, Bed 
position Low and Locked.

Fall Precautions: 

Yellow Socks

Door Sign

Patient Fall Education

Contact isolation endorsed.

Report given to Rogre CHANEL RN.

## 2020-09-29 NOTE — NUR
NURSE NOTES:



Informed Dr. Cherry regarding today's xray result. Informed MD the Chest Xray Impression: 
Interval retraction of right sided chest tube with increasing right pneumothorax. 
Repositioning of chest tube recommended. Informed Dr. Cherry that Dr. Knox was already paged 
and awaiting for call back. Dr. Chrery recommends to connect right chest tube to low 
continuous suction instead of intermittent.

## 2020-09-29 NOTE — NUR
NURSE NOTES:

Called Dr. Knox's office again, spoke with  again, followed up regarding patient's 
urgent report. She said that Dr. Knox is not answering the pager. She will call Dr. Knox again. 
ICU phone number left with Dr. Knox's . Charge nurse made aware.

## 2020-09-29 NOTE — NUR
RD ASSESSMENT & RECOMMENDATIONS

SEE CARE ACTIVITY FOR COMPLETE ASSESSMENT



DAILY ESTIMATED NEEDS:

Needs based on CRITICAL CARE, 50kg  

22-28  kcals/kg 

1576-0630  total kcals

1.25-2  g protein/kg

  g total protein 

25-30  mL/kg

9982-3591  total fluid mLs



NUTRITION DIAGNOSIS:

Swallowing difficulty r/t dysphagia as evdienced by pt w/ Downs Syndrome,

PEG dep for all nutritional needs, now intubated, now off pressor support,

ICU status.



     

CURRENT TF:Vital AF 1.2 @ 50ml/hr 24 hrs 



 



ENTERAL NUTRITION RECOMMENDATIONS:

VITAL AF 1.2 @ 45ml/hr x 24 hrs  to provide 1080ml, 1296kcal, 81g prot, 876ml free water 



- Maintain Vital AF for carb control, critical care, elemental feeds for diarrhea

- For continuous TF of x 24 hrs (off Synthroid at this time),

  rec goal rate of 45ml/hr x 24 hrs

      -> will meet 100% est kcal/prot needs

-----

If Synthroid is re-added to medlist, recommend:

 VITAL AF 1.2 @ 50ml/hr x22 hrs to provide 1100ml, 1320 kcal, 83g pro, 892ml free H2O (hold 
TF 1 hr before and after Synthroid)



  



ADDITIONAL RECOMMENDATIONS:

1) Ht of 61 inches per SNF; recalibrate bed scale for accurate CBW  

2) Add NISS: BGs now in the 200's, on Solucortef-> NOW ON NISS + LEVEMIR 

3) Monitor hemodynamic stability: pressors held at this time 

4) Wound healing: add Vit C 250mg QD + continue Marcio BID 

5) Monitor lytes, replete as needed (low K) 

. 

.

## 2020-09-29 NOTE — NUR
NURSE NOTES:

Dr. Knox called back, discussed patient's condition. Read today's chest Xray Impression: 
Interval retraction of right sided chest tube with increasing right pneumothorax. 
Repositioning of chest tube recommended. And that Dr. Cherry recommends to connect right 
chest tube to low continuous suction instead of intermittent. Dr. Knox ordered to connect 
patient's right chest tube to low continuous suction. And to repeat chest xray tomorrow, and 
call him for chest xray results.

## 2020-09-29 NOTE — NUR
NOTE



There is a copy of POLST- full code signed by pt in March 2018. SW attempted to meet w/ pt 
to assess her current mental status. Pt is awake and making eye contacts w/ this SW. SW 
attempted to assess pt by asking her to blink her eyes, pointing, or using her fingers/hands 
to give signals. However, pt was starring at this SW and was unable to express herself in a 
meaningful way. 



SW received a voicemail from the , Kaylan Jo [phone]. SW left another 
voicemail for call back.

## 2020-09-29 NOTE — NUR
NURSE NOTES:

Received patient from Roger CHANEL RN. Orally intubated, ET size of 7.5, 22 cm in the lip, 
mechanical ventilator dependent. Ventilator settings of AC 26, Tidal Volume 500, FiO2 of 
70%, no PEEP. On continuous GTF of Vital running at 50ml/hour. Patient asleep. Remains Sinus 
Jose Daniel in the monitor, heart rate of 41. Left upper arm PICC noted. Valle cath inplace with 
yellow clear urine output noted in drainage bag by gravity. Right lateral chest tube remains 
in place, connected to low intermittent suction, with serous drainage noted in the 
pleurovac.  Contact isolation observed. Will continue plan of care.

## 2020-09-29 NOTE — NEPHROLOGY PROGRESS NOTE
Assessment/Plan


Problem List:  


(1) DAVID (acute kidney injury)


(2) Respiratory failure requiring intubation


(3) Down's syndrome


(4) Seizure disorder


(5) Hypothyroidism


Assessment





Acute renal failure, likely due to hypotension


Acute respiratory distress, hypoxia


Seizure disorder


Hypothyroidism


Down syndrome


Full code











Fluid challenge with IV fluids and albumin


Midodrine for BP above 100 systolic


Check TSH level


Check


Correct level


Monitor renal parameters


Urine studies


Per orders


Plan


September 29: Labs reviewed.  Electrolyte abnormalities addressed and replaced. 

Medication list reviewed.  Heart rate remains mid 50s.  Continue as is.


September 28: On ventilator.  Full code.  Heart rate low.  Will discontinue 

Midodrin.  Continue to rest.  Electrolytes within normal limit.  Discussed with 

RN.


September 27: Remains intubated.  Full code.  No plan for tracheostomy yet.  

Labs reviewed.  All acceptable.  Continue same management


September 26: Labs reviewed.  Discussed with RN.  Potassium and phosphorus and 

magnesium replacement ordered.  Hemoglobin 9.5.  Patient remains full code.  

Continue per consultants.


September 25: Lab reviewed.  Renal parameters stable.  Full code.  Intubated.  

Electrolyte abnormalities addressed and replacement done.


September 24: Lab reviewed.  Renal parameters stable.  Full code.  Intubated.  

Has right side chest tube.  Continue per consultants.


September 23: Lab reviewed.  Renal parameters stable.  Full code.  Has right 

chest tube.  Planning process for tracheostomy.  Defer to chest and general 

surgeon.


September 22: Labs reviewed.  Renal parameters stable.  Remains full code.  

Remains on ventilator.  Due for tracheostomy tomorrow.  Continue per 

consultants.  Patient has a right chest tube in place at this time.


September 21: Labs reviewed.  Electrolyte imbalances addressed and supplemented.

 Remains full code and on ventilator.  Continue to monitor renal parameters.  

Continue per consultants.


September 20: Labs reviewed.  Serum potassium again low today.  Potassium 

supplement IV and through GT given.  Patient remains full code and is on ve

ntilator.  Continue per consultants.  Continue to monitor electrolytes and renal

parameters.


September 19: Labs reviewed.  Abnormal electrolyte addressed.  Remains full 

code.  Remains vented.


September 18: Day 27 of hospitalization.  Full code.  Labs reviewed.  Hemoglobin

down to 7.5.  Electrolyte abnormalities addressed and corrections ordered.  

Continue to monitor renal parameters.  Continue per consultants.  Start on 

Levemir for blood sugar management.  Questioning continuation of hydrocortisone?


September 17: Labs reviewed.  Potassium, phosphorus, hemoglobin, are all low.  

Potassium and phosphorus IV replacement given.  Continue to monitor electrolytes

and CBC.  Patient remains full code.


September 16: Lab reviewed.  Low phosphorus low magnesium and low potassium was 

addressed.  Hemoglobin drifting lower.  Continue per consultants.  Patient 

remains full code.


September 15: Labs reviewed.  Patient continues to be on ventilator.  D5W for 

high sodium and also potassium chloride intravenously as supplement given.  

Hemoglobin 8.4.  Continue to monitor electrolytes and renal parameters.


September 14: Labs reviewed.  Low potassium and high sodium noted.  Hemoglobin 

8.1 stable.  Aim to correct abnormal electrolyte.  Continue rest.  Will give 2 

boluses of D5W 500 cc.


September 13: Lab reviewed.  Abnormal electrolytes noted and addressed.


September 12: Labs reviewed.  Potassium supplement given.  Patient remains full 

code.  Continue per consultants.


September 11: Lab reviewed.  Electrolyte abnormalities addressed.  Continue per 

pulmonary and ID.


September 10: Lab reviewed.  Status unchanged.  Serum sodium 151 unchanged.  

Stable from renal standpoint of view.


September 9: Labs reviewed.  Status quo.  D5W 500 cc IV ordered.  Continue to 

monitor renal parameters.


September 8: Status quo.  Labs reviewed.  Overall condition unchanged.  Patient 

was transfused and hemoglobin higher.  Continue current management.  Patient 

remains full code.


September 7: Status quo.  Overall condition poor.  Very low albumin.  Edematous.

 Hypotensive.  Hemoglobin lower.  Anemia work-up ordered.  I favor transfusion 2

units of packed RBCs.  Patient remains full code.  I favor supportive care only.

 Will discuss.


September 6: Electrolyte abnormalities addressed.  Serum creatinine lower.  

Continue per current management.


September 5: Status unchanged.  Lab reviewed.  Serum potassium 2.7.  IV 

potassium chloride ordered.  Serum creatinine low at 1.6 stable.  Blood pressure

90s systolic


September 4: Status quo.  Labs reviewed.  Renal parameters stable.  Serum 

creatinine down to 1.6.  Medication list reviewed.  Continues to be on 

midodrine.  Continue per consultants.


September 3: Status quo.  Labs reviewed.  Electrolytes adjusted.  Serum 

creatinine down to 1.8.  Continue per consultants.


September 2: Status quo.  Labs reviewed.  Phosphorus supplement IV given.  Serum

creatinine 2.  Continue per consultants.


September 1: Requires less pressors.  Albumin bolus given.  1 dose of Lasix IV 

ordered as the patient severely edematous.  Patient serum albumin is very low.  

Continue per consultants.


August 31: Continues to be intubated.  Labs reviewed.  Serum creatinine 1.9 

unchanged.  Blood pressure more stable.  Off 1 of the pressors.  Continue to 

monitor renal parameters.  Continue per consultants.  Patient now on 

hydrocortisone 100 mg every 8 hours.  Will decrease IV fluid.  Normal saline 

down to 50 cc an hour.


August 30: Intubated.  Labs reviewed.  Creatinine 1.9 unchanged.  Continue same 

treatment plan.  Per consultants.  Overall poor prognosis since the patient 

remains on pressors and her pulmonary status is worsening.


August 29: Remains intubated.  Labs reviewed.  Creatinine 1.9.  Blood pressure 

systolic 90s.  Continue per consultants.


August 28: Remains intubated.  Labs reviewed.  Serum creatinine lower to 2.  

Vancomycin level lower.  Remains hypotensive on pressors.  Will increase 

midodrine to 10 mg every 8 hours.  Continue per consultants.  Continue to 

monitor renal parameters.


August 27: Patient now in ICU.  Intubated.  On pressors.  Labs reviewed.  Will 

increase midodrine.  Aim to keep blood pressure over 100 systolic.  Will give 

albumin bolus.  Will check vancomycin level which was elevated when checked 

previously on August 24.  Will monitor renal parameters.  Continue per 

consultants.





Subjective


ROS Limited/Unobtainable:  Yes





Objective


Objective





Last 24 Hour Vital Signs








  Date Time  Temp Pulse Resp B/P (MAP) Pulse Ox O2 Delivery O2 Flow Rate FiO2


 


9/29/20 14:00  53 20 99/55 (70) 98   


 


9/29/20 13:00  62 23 128/55 (79) 97   


 


9/29/20 12:00      Mechanical Ventilator  





      Mechanical Ventilator  





      Mechanical Ventilator  


 


9/29/20 12:00 98.3 59 25 102/59 (73) 97   


 


9/29/20 11:48  54      


 


9/29/20 11:17        60


 


9/29/20 11:16  59 24     70


 


9/29/20 11:00  55 24 104/59 (74) 99   


 


9/29/20 10:00  54 25 101/56 (71) 99   


 


9/29/20 09:00  54 22 93/58 (70) 98   


 


9/29/20 08:00 98.3 61 24 118/89 (99) 100   


 


9/29/20 08:00      Mechanical Ventilator  





      Mechanical Ventilator  





      Mechanical Ventilator  


 


9/29/20 08:00        70


 


9/29/20 07:57  60 24     70


 


9/29/20 07:41  42      


 


9/29/20 07:00  39 24 111/60 (77) 99   


 


9/29/20 06:00  41 26 116/61 (79) 99   


 


9/29/20 05:00  45 30 96/48 (64) 97   


 


9/29/20 04:00  45      


 


9/29/20 04:00 98.4 57 30 117/52 (73) 97   


 


9/29/20 04:00        70


 


9/29/20 04:00      Mechanical Ventilator  





      Mechanical Ventilator  





      Mechanical Ventilator  


 


9/29/20 03:32  60 24     70


 


9/29/20 03:00  61 26 106/74 (85) 94   


 


9/29/20 02:00  42 26 87/49 (62) 97   


 


9/29/20 01:00  44 24 89/57 (68) 97   


 


9/29/20 00:00        70


 


9/29/20 00:00      Mechanical Ventilator  





      Mechanical Ventilator  





      Mechanical Ventilator  


 


9/29/20 00:00  54      


 


9/29/20 00:00 98.6 50 29 113/61 (78) 98   


 


9/28/20 23:04  58 24     70


 


9/28/20 23:00  54 20 116/62 (80) 97   


 


9/28/20 22:00  50 26 114/66 (82) 98   


 


9/28/20 21:00  53 26 101/41 (61) 98   


 


9/28/20 20:00  50      


 


9/28/20 20:00 98.5 48 27 108/52 (70) 97   


 


9/28/20 20:00        70


 


9/28/20 20:00      Mechanical Ventilator  





      Mechanical Ventilator  





      Mechanical Ventilator  


 


9/28/20 19:10  50 24     70


 


9/28/20 19:00  49 22 123/73 (90) 98   


 


9/28/20 18:00  49 28 124/77 (93) 100   


 


9/28/20 17:00  46 25 103/78 (86) 99   


 


9/28/20 16:00      Mechanical Ventilator  





      Mechanical Ventilator  





      Mechanical Ventilator  


 


9/28/20 16:00  48      


 


9/28/20 16:00 98.6 49 25 107/60 (76) 98   


 


9/28/20 16:00        70


 


9/28/20 15:00  69 16 124/74 (91) 98   


 


9/28/20 14:40  49 24     70

















Intake and Output  


 


 9/28/20 9/29/20





 19:00 07:00


 


Intake Total 760 ml 800 ml


 


Output Total 765 ml 831 ml


 


Balance -5 ml -31 ml


 


  


 


Free Water  200 ml


 


Tube Feeding 600 ml 500 ml


 


Blood Product  100 ml


 


Other 160 ml 


 


Output Urine Total 705 ml 785 ml


 


Stool Total  1 ml


 


Chest Tube Drainage Total  45 ml


 


Drainage Total 60 ml 


 


# Bowel Movements 2 3








Laboratory Tests


9/28/20 17:52: POC Whole Blood Glucose 112H


9/28/20 20:43: POC Whole Blood Glucose [Pending]


9/29/20 00:02: POC Whole Blood Glucose [Pending]


9/29/20 04:40: 


White Blood Count 5.0, Red Blood Count 2.80L, Hemoglobin 8.8L, Hematocrit 26.3L,

 Mean Corpuscular Volume 94, Mean Corpuscular Hemoglobin 31.7H, Mean Corpuscular

 Hemoglobin Concent 33.7, Red Cell Distribution Width 16.7H, Platelet Count 104L

, Mean Platelet Volume 8.5, Neutrophils (%) (Auto) 77.8H, Lymphocytes (%) (Auto)

 13.4L, Monocytes (%) (Auto) 8.1, Eosinophils (%) (Auto) 0.3, Basophils (%) 

(Auto) 0.5, Sodium Level 146H, Potassium Level 3.1L, Chloride Level 112H, Carbon

 Dioxide Level 27, Anion Gap 7, Blood Urea Nitrogen 22H, Creatinine 0.6, Estimat

 Glomerular Filtration Rate > 60, Glucose Level 170H, Calcium Level 7.1L, Total 

Bilirubin 0.6, Aspartate Amino Transf (AST/SGOT) 22, Alanine Aminotransferase 

(ALT/SGPT) 30, Alkaline Phosphatase 52, Pro-B-Type Natriuretic Peptide 3540H, 

Total Protein 4.2L, Albumin 1.2L, Globulin 3.0, Albumin/Globulin Ratio 0.4L, 

Thyroid Stimulating Hormone (TSH) 3.345


9/29/20 05:30: POC Whole Blood Glucose [Pending]


9/29/20 08:55: POC Whole Blood Glucose [Pending]


9/29/20 12:06: POC Whole Blood Glucose 185H


Height (Feet):  5


Height (Inches):  3.00


Weight (Pounds):  172


General Appearance:  no apparent distress


Cardiovascular:  normal rate, bradycardia


Respiratory/Chest:  decreased breath sounds


Abdomen:  soft, distended











Lam Nino MD            Sep 29, 2020 14:23

## 2020-09-29 NOTE — NUR
NURSE NOTES:



Dr. Nino at bedside, aware of today's lab results.  20meq Potassium IV x 1 currently 
infusing.

## 2020-09-29 NOTE — NUR
NURSE NOTES:

Called SW and Spoke to AUDREY Hays.  Advised that Bioethics consult has been placed.  Updated 
on the current status of the patient and the concern at present with a decreased mentation.  
MSW will contact Dr. Cherry to get further clarification regarding what type of code status 
he wants at this time.  Will update the primary nurse as well as PM Charge.

## 2020-09-29 NOTE — NUR
NURSE NOTES:

Notified Nursing supervisor SIRIA Cherry not calling back. Patient noted to be more 
Lethargic.

## 2020-09-29 NOTE — NUR
NOTE



SW spoke w/ SMITA Whitman and Dr. Cherry and discussed the case. SW reviewed the chart. AUDREY 
spoke w/ Nora from [facility] office [phone] that pt is NOT conserved. AUDREY attempted to call 
pt's  at [facility], Kaylan Jo [phone] and left a 
voicemail for call back. 



AUDREY also attempted to call the other emergency contact, Sakshi Bueno [phone], the call was 
not answered w/o voicemail option. 



AUDREY will proceed with Bioethics consult after clarifying pt's guardianship with [facility]. AUDREY will continue to F/U.

## 2020-09-29 NOTE — INTERNAL MED PROGRESS NOTE
Subjective


Date of Service:  Sep 29, 2020


Physician Name


Sanya Schultz


Attending Physician


Chano Cherry MD





Current Medications








 Medications


  (Trade)  Dose


 Ordered  Sig/Didi


 Route


 PRN Reason  Start Time


 Stop Time Status Last Admin


Dose Admin


 


 Acetaminophen


  (Tylenol)  650 mg  Q4H  PRN


 GT


 For Pain  9/23/20 17:15


 10/23/20 17:14  9/29/20 17:33





 


 Chlorhexidine


 Gluconate


  (Beatrice-Hex 2%)  1 applic  DAILY@2000


 TOPIC


   8/31/20 20:00


 11/29/20 19:59  9/28/20 20:51





 


 Clotrimazole


  (Lotrimin)  1 applic  Q12HR


 TOPIC


   8/30/20 13:00


 11/28/20 12:59  9/29/20 08:56





 


 Dextrose


  (Dextrose 50%)  25 ml  Q30M  PRN


 IV


 Hypoglycemia  8/26/20 11:30


 11/20/20 11:29   





 


 Dextrose


  (Dextrose 50%)  50 ml  Q30M  PRN


 IV


 Hypoglycemia  8/26/20 11:30


 11/20/20 11:29   





 


 Hydrocortisone


  (Solu-CORTEF)  50 mg  EVERY 8  HOURS


 IV


   9/28/20 06:00


 12/27/20 05:59  9/29/20 13:22





 


 Insulin Aspart


  (NovoLOG)    Q6HR


 SUBQ


   9/18/20 12:00


 12/17/20 11:59  9/29/20 12:09





 


 Insulin Detemir


  (Levemir)  10 units  Q12HR


 SUBQ


   9/18/20 10:00


 12/17/20 09:59  9/29/20 08:59





 


 Levothyroxine


 Sodium


  (Synthroid)  75 mcg  DAILY@0630


 GT


   9/30/20 06:30


 10/30/20 06:29   





 


 Loperamide HCl


  (Imodium)  2 mg  Q6H  PRN


 NG


 Diarrhea  9/16/20 10:30


 10/16/20 10:29  9/23/20 11:41





 


 Pantoprazole


  (Protonix)  40 mg  EVERY 12  HOURS


 IVP


   9/28/20 21:00


 10/28/20 20:59  9/29/20 08:56





 


 Potassium Chloride


  (K-Dur)  40 meq  EVERY 12  HOURS


 GT


   9/26/20 21:00


 12/19/20 12:14  9/29/20 08:56











Allergies:  


Coded Allergies:  


     No Known Allergies (Unverified , 1/8/19)


ROS Limited/Unobtainable:  Yes


Subjective


60 YO F with Down's syndrome admitted with hypoxia.  Now sepsis and pneumonia.  

Cover for Int Med-DR Hung.  ICU.  Intubated and sedated





Objective





Last Vital Signs








  Date Time  Temp Pulse Resp B/P (MAP) Pulse Ox O2 Delivery O2 Flow Rate FiO2


 


9/29/20 17:00  66 28 102/57 (72) 96   


 


9/29/20 16:00      Mechanical Ventilator  





      Mechanical Ventilator  





      Mechanical Ventilator  


 


9/29/20 16:00        60


 


9/29/20 12:00 98.3       











Laboratory Tests








Test


 9/28/20


17:52 9/28/20


20:43 9/29/20


00:02 9/29/20


04:40


 


POC Whole Blood Glucose


 112 MG/DL


()  H Pending  


 Pending  


 





 


White Blood Count


 


 


 


 5.0 K/UL


(4.8-10.8)


 


Red Blood Count


 


 


 


 2.80 M/UL


(4.20-5.40)  L


 


Hemoglobin


 


 


 


 8.8 G/DL


(12.0-16.0)  L


 


Hematocrit


 


 


 


 26.3 %


(37.0-47.0)  L


 


Mean Corpuscular Volume    94 FL (80-99)  


 


Mean Corpuscular Hemoglobin


 


 


 


 31.7 PG


(27.0-31.0)  H


 


Mean Corpuscular Hemoglobin


Concent 


 


 


 33.7 G/DL


(32.0-36.0)


 


Red Cell Distribution Width


 


 


 


 16.7 %


(11.6-14.8)  H


 


Platelet Count


 


 


 


 104 K/UL


(150-450)  L


 


Mean Platelet Volume


 


 


 


 8.5 FL


(6.5-10.1)


 


Neutrophils (%) (Auto)


 


 


 


 77.8 %


(45.0-75.0)  H


 


Lymphocytes (%) (Auto)


 


 


 


 13.4 %


(20.0-45.0)  L


 


Monocytes (%) (Auto)


 


 


 


 8.1 %


(1.0-10.0)


 


Eosinophils (%) (Auto)


 


 


 


 0.3 %


(0.0-3.0)


 


Basophils (%) (Auto)


 


 


 


 0.5 %


(0.0-2.0)


 


Sodium Level


 


 


 


 146 MMOL/L


(136-145)  H


 


Potassium Level


 


 


 


 3.1 MMOL/L


(3.5-5.1)  L


 


Chloride Level


 


 


 


 112 MMOL/L


()  H


 


Carbon Dioxide Level


 


 


 


 27 MMOL/L


(21-32)


 


Anion Gap


 


 


 


 7 mmol/L


(5-15)


 


Blood Urea Nitrogen


 


 


 


 22 mg/dL


(7-18)  H


 


Creatinine


 


 


 


 0.6 MG/DL


(0.55-1.30)


 


Estimat Glomerular Filtration


Rate 


 


 


 > 60 mL/min


(>60)


 


Glucose Level


 


 


 


 170 MG/DL


()  H


 


Calcium Level


 


 


 


 7.1 MG/DL


(8.5-10.1)  L


 


Total Bilirubin


 


 


 


 0.6 MG/DL


(0.2-1.0)


 


Aspartate Amino Transf


(AST/SGOT) 


 


 


 22 U/L (15-37)





 


Alanine Aminotransferase


(ALT/SGPT) 


 


 


 30 U/L (12-78)





 


Alkaline Phosphatase


 


 


 


 52 U/L


()


 


Pro-B-Type Natriuretic Peptide


 


 


 


 3540 pg/mL


(0-125)  H


 


Total Protein


 


 


 


 4.2 G/DL


(6.4-8.2)  L


 


Albumin


 


 


 


 1.2 G/DL


(3.4-5.0)  L


 


Globulin    3.0 g/dL  


 


Albumin/Globulin Ratio


 


 


 


 0.4 (1.0-2.7)


L


 


Thyroid Stimulating Hormone


(TSH) 


 


 


 3.345 uiU/mL


(0.358-3.740)


 


Test


 9/29/20


05:30 9/29/20


08:55 9/29/20


12:06 9/29/20


12:41


 


POC Whole Blood Glucose


 Pending  


 Pending  


 185 MG/DL


()  H 





 


Arterial Blood pH


 


 


 


 7.508


(7.350-7.450)


 


Arterial Blood Partial


Pressure CO2 


 


 


 37.2 mmHg


(35.0-45.0)


 


Arterial Blood Partial


Pressure O2 


 


 


 89.8 mmHg


(75.0-100.0)


 


Arterial Blood HCO3


 


 


 


 28.9 mmol/L


(22.0-26.0)  H


 


Arterial Blood Oxygen


Saturation 


 


 


 96.6 %


()


 


Arterial Blood Base Excess    5.5 (-2-2)  H


 


Cole Test    Positive  


 


Test


 9/29/20


17:25 


 


 





 


POC Whole Blood Glucose


 102 MG/DL


() 


 


 




















Intake and Output  


 


 9/28/20 9/29/20





 18:59 06:59


 


Intake Total 760 ml 800 ml


 


Output Total 735 ml 896 ml


 


Balance 25 ml -96 ml


 


  


 


Free Water  200 ml


 


Tube Feeding 600 ml 500 ml


 


Blood Product  100 ml


 


Other 160 ml 


 


Output Urine Total 735 ml 790 ml


 


Stool Total  1 ml


 


Chest Tube Drainage Total  45 ml


 


Drainage Total  60 ml


 


# Bowel Movements 2 3








Objective


General Appearance:  WD/WN, no apparent distress, alert


EENT:  PERRL/EOMI, normal ENT inspection


Neck:  non-tender, normal alignment, supple, normal inspection


Cardiovascular:  normal peripheral pulses, normal rate, regular rhythm, no 

gallop/murmur, no JVD


Respiratory/Chest:  Mech vent; decreased breath sounds, crackles/rales, rhonchi 

- bilaterally, expiratory wheezing


Abdomen:  normal bowel sounds, non tender, soft, no organomegaly, no mass


Extremities:  normal range of motion


Neurologic:  CNs II-XII grossly normal


Skin:  normal pigmentation, warm/dry





Assessment/Plan


Problem List:  


(1) HCAP (healthcare-associated pneumonia)


Assessment & Plan:  Strep Group G.  S/P amikacin per ID=Dr Gomes.  

Pulmonary/Critical care=DR Cherry.  COVID NEG





(2) Sepsis


Assessment & Plan:  Staph haemolyticus.  S/P amikacin per ID=Dr Gomes





(3) Down's syndrome


(4) Dysphagia


Assessment & Plan:  S/P PEG





(5) Seizure disorder


Assessment & Plan:  Continue keppra and depakote





(6) Hypothyroidism


Assessment & Plan:  Continue synthroid





(7) Acute respiratory failure


Assessment & Plan:  Pulmonary = Dr Cherry; mech vent





(8) Pneumothorax, right


Assessment & Plan:  Continue chest tube per pulmonary





(9) Cardiac arrest


Assessment & Plan:  8/26/20-see cardiology note=Sanya Dunn MD               Sep 29, 2020 17:45

## 2020-09-29 NOTE — CARDIOLOGY PROGRESS NOTE
Assessment/Plan


Assessment/Plan


sepsis


respiratory failure 


ards 


renal insuf 


bacteremia


abn cardiac enzyme due to demand 


sinus arnold stable 


thrombocytopenia resolved  


hypernatremia 


pneumothorax now s/p chest tube 











vent support 


abx 


may be mobilizing her fluids


cxr pers reviewed  not seem changed 


has air leak still 


tsh seems fine 


remains off pressor still at this time 


hr inthe 60's s no sig pauses on tele 


tele personally reviewed 


 








Subjective


ROS Limited/Unobtainable:  Yes


Subjective


on  a vent not communicative AWAKE





Objective





Last 24 Hour Vital Signs








  Date Time  Temp Pulse Resp B/P (MAP) Pulse Ox O2 Delivery O2 Flow Rate FiO2


 


9/29/20 12:00      Mechanical Ventilator  





      Mechanical Ventilator  





      Mechanical Ventilator  


 


9/29/20 12:00 98.3 59 25 102/59 (73) 97   


 


9/29/20 11:48  54      


 


9/29/20 11:17        60


 


9/29/20 11:16  59 24     70


 


9/29/20 11:00  55 24 104/59 (74) 99   


 


9/29/20 10:00  54 25 101/56 (71) 99   


 


9/29/20 09:00  54 22 93/58 (70) 98   


 


9/29/20 08:00 98.3 61 24 118/89 (99) 100   


 


9/29/20 08:00      Mechanical Ventilator  





      Mechanical Ventilator  





      Mechanical Ventilator  


 


9/29/20 08:00        70


 


9/29/20 07:57  60 24     70


 


9/29/20 07:41  42      


 


9/29/20 07:00  39 24 111/60 (77) 99   


 


9/29/20 06:00  41 26 116/61 (79) 99   


 


9/29/20 05:00  45 30 96/48 (64) 97   


 


9/29/20 04:00  45      


 


9/29/20 04:00 98.4 57 30 117/52 (73) 97   


 


9/29/20 04:00        70


 


9/29/20 04:00      Mechanical Ventilator  





      Mechanical Ventilator  





      Mechanical Ventilator  


 


9/29/20 03:32  60 24     70


 


9/29/20 03:00  61 26 106/74 (85) 94   


 


9/29/20 02:00  42 26 87/49 (62) 97   


 


9/29/20 01:00  44 24 89/57 (68) 97   


 


9/29/20 00:00        70


 


9/29/20 00:00      Mechanical Ventilator  





      Mechanical Ventilator  





      Mechanical Ventilator  


 


9/29/20 00:00  54      


 


9/29/20 00:00 98.6 50 29 113/61 (78) 98   


 


9/28/20 23:04  58 24     70


 


9/28/20 23:00  54 20 116/62 (80) 97   


 


9/28/20 22:00  50 26 114/66 (82) 98   


 


9/28/20 21:00  53 26 101/41 (61) 98   


 


9/28/20 20:00  50      


 


9/28/20 20:00 98.5 48 27 108/52 (70) 97   


 


9/28/20 20:00        70


 


9/28/20 20:00      Mechanical Ventilator  





      Mechanical Ventilator  





      Mechanical Ventilator  


 


9/28/20 19:10  50 24     70


 


9/28/20 19:00  49 22 123/73 (90) 98   


 


9/28/20 18:00  49 28 124/77 (93) 100   


 


9/28/20 17:00  46 25 103/78 (86) 99   


 


9/28/20 16:00      Mechanical Ventilator  





      Mechanical Ventilator  





      Mechanical Ventilator  


 


9/28/20 16:00  48      


 


9/28/20 16:00 98.6 49 25 107/60 (76) 98   


 


9/28/20 16:00        70


 


9/28/20 15:00  69 16 124/74 (91) 98   


 


9/28/20 14:40  49 24     70


 


9/28/20 14:00  52 23 116/74 (88) 98   








General Appearance:  no apparent distress, on vent, patient on isolation, 

isolation precautions


Cardiovascular:  normal rate


Respiratory/Chest:  crackles/rales - upper left , rhonchi - bilaterally


Abdomen:  normal bowel sounds, non tender, soft


Extremities:  moderate edema - improved edema











Intake and Output  


 


 9/28/20 9/29/20





 18:59 06:59


 


Intake Total 760 ml 800 ml


 


Output Total 735 ml 896 ml


 


Balance 25 ml -96 ml


 


  


 


Free Water  200 ml


 


Tube Feeding 600 ml 500 ml


 


Blood Product  100 ml


 


Other 160 ml 


 


Output Urine Total 735 ml 790 ml


 


Stool Total  1 ml


 


Chest Tube Drainage Total  45 ml


 


Drainage Total  60 ml


 


# Bowel Movements 2 3











Laboratory Tests








Test


 9/28/20


17:52 9/28/20


20:43 9/29/20


00:02 9/29/20


04:40


 


POC Whole Blood Glucose


 112 MG/DL


()  H Pending  


 Pending  


 





 


White Blood Count


 


 


 


 5.0 K/UL


(4.8-10.8)


 


Red Blood Count


 


 


 


 2.80 M/UL


(4.20-5.40)  L


 


Hemoglobin


 


 


 


 8.8 G/DL


(12.0-16.0)  L


 


Hematocrit


 


 


 


 26.3 %


(37.0-47.0)  L


 


Mean Corpuscular Volume    94 FL (80-99)  


 


Mean Corpuscular Hemoglobin


 


 


 


 31.7 PG


(27.0-31.0)  H


 


Mean Corpuscular Hemoglobin


Concent 


 


 


 33.7 G/DL


(32.0-36.0)


 


Red Cell Distribution Width


 


 


 


 16.7 %


(11.6-14.8)  H


 


Platelet Count


 


 


 


 104 K/UL


(150-450)  L


 


Mean Platelet Volume


 


 


 


 8.5 FL


(6.5-10.1)


 


Neutrophils (%) (Auto)


 


 


 


 77.8 %


(45.0-75.0)  H


 


Lymphocytes (%) (Auto)


 


 


 


 13.4 %


(20.0-45.0)  L


 


Monocytes (%) (Auto)


 


 


 


 8.1 %


(1.0-10.0)


 


Eosinophils (%) (Auto)


 


 


 


 0.3 %


(0.0-3.0)


 


Basophils (%) (Auto)


 


 


 


 0.5 %


(0.0-2.0)


 


Sodium Level


 


 


 


 146 MMOL/L


(136-145)  H


 


Potassium Level


 


 


 


 3.1 MMOL/L


(3.5-5.1)  L


 


Chloride Level


 


 


 


 112 MMOL/L


()  H


 


Carbon Dioxide Level


 


 


 


 27 MMOL/L


(21-32)


 


Anion Gap


 


 


 


 7 mmol/L


(5-15)


 


Blood Urea Nitrogen


 


 


 


 22 mg/dL


(7-18)  H


 


Creatinine


 


 


 


 0.6 MG/DL


(0.55-1.30)


 


Estimat Glomerular Filtration


Rate 


 


 


 > 60 mL/min


(>60)


 


Glucose Level


 


 


 


 170 MG/DL


()  H


 


Calcium Level


 


 


 


 7.1 MG/DL


(8.5-10.1)  L


 


Total Bilirubin


 


 


 


 0.6 MG/DL


(0.2-1.0)


 


Aspartate Amino Transf


(AST/SGOT) 


 


 


 22 U/L (15-37)





 


Alanine Aminotransferase


(ALT/SGPT) 


 


 


 30 U/L (12-78)





 


Alkaline Phosphatase


 


 


 


 52 U/L


()


 


Pro-B-Type Natriuretic Peptide


 


 


 


 3540 pg/mL


(0-125)  H


 


Total Protein


 


 


 


 4.2 G/DL


(6.4-8.2)  L


 


Albumin


 


 


 


 1.2 G/DL


(3.4-5.0)  L


 


Globulin    3.0 g/dL  


 


Albumin/Globulin Ratio


 


 


 


 0.4 (1.0-2.7)


L


 


Thyroid Stimulating Hormone


(TSH) 


 


 


 3.345 uiU/mL


(0.358-3.740)


 


Test


 9/29/20


05:30 9/29/20


08:55 9/29/20


12:06 





 


POC Whole Blood Glucose


 Pending  


 Pending  


 185 MG/DL


()  H 




















Constantine Jorgensen MD          Sep 29, 2020 13:18

## 2020-09-29 NOTE — GENERAL PROGRESS NOTE
Subjective


ROS Limited/Unobtainable:  No


Allergies:  


Coded Allergies:  


     No Known Allergies (Unverified , 1/8/19)





Objective





Last 24 Hour Vital Signs








  Date Time  Temp Pulse Resp B/P (MAP) Pulse Ox O2 Delivery O2 Flow Rate FiO2


 


9/29/20 11:17        60


 


9/29/20 11:16  59 24     70


 


9/29/20 11:00  55 24 104/59 (74) 99   


 


9/29/20 10:00  54 25 101/56 (71) 99   


 


9/29/20 09:00  54 22 93/58 (70) 98   


 


9/29/20 08:00 98.3 61 24 118/89 (99) 100   


 


9/29/20 08:00      Mechanical Ventilator  





      Mechanical Ventilator  





      Mechanical Ventilator  


 


9/29/20 08:00        70


 


9/29/20 07:57  60 24     70


 


9/29/20 07:41  42      


 


9/29/20 07:00  39 24 111/60 (77) 99   


 


9/29/20 06:00  41 26 116/61 (79) 99   


 


9/29/20 05:00  45 30 96/48 (64) 97   


 


9/29/20 04:00  45      


 


9/29/20 04:00 98.4 57 30 117/52 (73) 97   


 


9/29/20 04:00        70


 


9/29/20 04:00      Mechanical Ventilator  





      Mechanical Ventilator  





      Mechanical Ventilator  


 


9/29/20 03:32  60 24     70


 


9/29/20 03:00  61 26 106/74 (85) 94   


 


9/29/20 02:00  42 26 87/49 (62) 97   


 


9/29/20 01:00  44 24 89/57 (68) 97   


 


9/29/20 00:00        70


 


9/29/20 00:00      Mechanical Ventilator  





      Mechanical Ventilator  





      Mechanical Ventilator  


 


9/29/20 00:00  54      


 


9/29/20 00:00 98.6 50 29 113/61 (78) 98   


 


9/28/20 23:04  58 24     70


 


9/28/20 23:00  54 20 116/62 (80) 97   


 


9/28/20 22:00  50 26 114/66 (82) 98   


 


9/28/20 21:00  53 26 101/41 (61) 98   


 


9/28/20 20:00  50      


 


9/28/20 20:00 98.5 48 27 108/52 (70) 97   


 


9/28/20 20:00        70


 


9/28/20 20:00      Mechanical Ventilator  





      Mechanical Ventilator  





      Mechanical Ventilator  


 


9/28/20 19:10  50 24     70


 


9/28/20 19:00  49 22 123/73 (90) 98   


 


9/28/20 18:00  49 28 124/77 (93) 100   


 


9/28/20 17:00  46 25 103/78 (86) 99   


 


9/28/20 16:00      Mechanical Ventilator  





      Mechanical Ventilator  





      Mechanical Ventilator  


 


9/28/20 16:00  48      


 


9/28/20 16:00 98.6 49 25 107/60 (76) 98   


 


9/28/20 16:00        70


 


9/28/20 15:00  69 16 124/74 (91) 98   


 


9/28/20 14:40  49 24     70


 


9/28/20 14:00  52 23 116/74 (88) 98   


 


9/28/20 13:00  53 24 126/63 (84) 97   


 


9/28/20 12:00        70


 


9/28/20 12:00      Mechanical Ventilator  





      Mechanical Ventilator  





      Mechanical Ventilator  


 


9/28/20 12:00 98.6 51 21 131/57 (81) 97   

















Intake and Output  


 


 9/28/20 9/29/20





 18:59 06:59


 


Intake Total 760 ml 800 ml


 


Output Total 735 ml 896 ml


 


Balance 25 ml -96 ml


 


  


 


Free Water  200 ml


 


Tube Feeding 600 ml 500 ml


 


Blood Product  100 ml


 


Other 160 ml 


 


Output Urine Total 735 ml 790 ml


 


Stool Total  1 ml


 


Chest Tube Drainage Total  45 ml


 


Drainage Total  60 ml


 


# Bowel Movements 2 3








Laboratory Tests


9/28/20 17:52: POC Whole Blood Glucose 112H


9/28/20 20:43: POC Whole Blood Glucose [Pending]


9/29/20 00:02: POC Whole Blood Glucose [Pending]


9/29/20 04:40: 


White Blood Count 5.0, Red Blood Count 2.80L, Hemoglobin 8.8L, Hematocrit 26.3L,

Mean Corpuscular Volume 94, Mean Corpuscular Hemoglobin 31.7H, Mean Corpuscular 

Hemoglobin Concent 33.7, Red Cell Distribution Width 16.7H, Platelet Count 104L,

Mean Platelet Volume 8.5, Neutrophils (%) (Auto) 77.8H, Lymphocytes (%) (Auto) 

13.4L, Monocytes (%) (Auto) 8.1, Eosinophils (%) (Auto) 0.3, Basophils (%) 

(Auto) 0.5, Sodium Level 146H, Potassium Level 3.1L, Chloride Level 112H, Carbon

Dioxide Level 27, Anion Gap 7, Blood Urea Nitrogen 22H, Creatinine 0.6, Estimat 

Glomerular Filtration Rate > 60, Glucose Level 170H, Calcium Level 7.1L, Total 

Bilirubin 0.6, Aspartate Amino Transf (AST/SGOT) 22, Alanine Aminotransferase 

(ALT/SGPT) 30, Alkaline Phosphatase 52, Pro-B-Type Natriuretic Peptide 3540H, 

Total Protein 4.2L, Albumin 1.2L, Globulin 3.0, Albumin/Globulin Ratio 0.4L, 

Thyroid Stimulating Hormone (TSH) 3.345


9/29/20 05:30: POC Whole Blood Glucose [Pending]


9/29/20 08:55: POC Whole Blood Glucose [Pending]


Height (Feet):  5


Height (Inches):  3.00


Weight (Pounds):  172


General Appearance:  lethargic


EENT:  normal ENT inspection


Neck:  supple


Cardiovascular:  normal rate


Respiratory/Chest:  decreased breath sounds


Abdomen:  normal bowel sounds, non tender, soft


Extremities:  non-tender





Assessment/Plan


Status:  stable, not improved, unchanged


Assessment/Plan:


1. History of Down syndrome.


2. Dysphagia with G-tube.


3. Seizure disorder.


4. Hypothyroidism.


5. DAVID.


6. Pneumonia.


7. Sepsis.








fu H&H


prn blood transfusion to keep HGB above 7


ppi


GTF


hold GI procedures for now


stool ob + 1/2











Ted Nagy MD             Sep 29, 2020 11:45

## 2020-09-29 NOTE — NUR
NURSE NOTES:



Called Dr. Knox's office, spoke with . Left message that it's urgent.  said 
that she will page Dr. Knox.

## 2020-09-30 VITALS — DIASTOLIC BLOOD PRESSURE: 75 MMHG | SYSTOLIC BLOOD PRESSURE: 135 MMHG

## 2020-09-30 VITALS — DIASTOLIC BLOOD PRESSURE: 59 MMHG | SYSTOLIC BLOOD PRESSURE: 118 MMHG

## 2020-09-30 VITALS — SYSTOLIC BLOOD PRESSURE: 125 MMHG | DIASTOLIC BLOOD PRESSURE: 61 MMHG

## 2020-09-30 VITALS — SYSTOLIC BLOOD PRESSURE: 125 MMHG | DIASTOLIC BLOOD PRESSURE: 83 MMHG

## 2020-09-30 VITALS — SYSTOLIC BLOOD PRESSURE: 104 MMHG | DIASTOLIC BLOOD PRESSURE: 88 MMHG

## 2020-09-30 VITALS — DIASTOLIC BLOOD PRESSURE: 59 MMHG | SYSTOLIC BLOOD PRESSURE: 124 MMHG

## 2020-09-30 VITALS — SYSTOLIC BLOOD PRESSURE: 152 MMHG | DIASTOLIC BLOOD PRESSURE: 68 MMHG

## 2020-09-30 VITALS — DIASTOLIC BLOOD PRESSURE: 70 MMHG | SYSTOLIC BLOOD PRESSURE: 109 MMHG

## 2020-09-30 VITALS — SYSTOLIC BLOOD PRESSURE: 141 MMHG | DIASTOLIC BLOOD PRESSURE: 72 MMHG

## 2020-09-30 VITALS — SYSTOLIC BLOOD PRESSURE: 125 MMHG | DIASTOLIC BLOOD PRESSURE: 76 MMHG

## 2020-09-30 VITALS — SYSTOLIC BLOOD PRESSURE: 100 MMHG | DIASTOLIC BLOOD PRESSURE: 87 MMHG

## 2020-09-30 VITALS — DIASTOLIC BLOOD PRESSURE: 75 MMHG | SYSTOLIC BLOOD PRESSURE: 140 MMHG

## 2020-09-30 VITALS — SYSTOLIC BLOOD PRESSURE: 137 MMHG | DIASTOLIC BLOOD PRESSURE: 73 MMHG

## 2020-09-30 VITALS — DIASTOLIC BLOOD PRESSURE: 71 MMHG | SYSTOLIC BLOOD PRESSURE: 108 MMHG

## 2020-09-30 VITALS — DIASTOLIC BLOOD PRESSURE: 74 MMHG | SYSTOLIC BLOOD PRESSURE: 137 MMHG

## 2020-09-30 VITALS — DIASTOLIC BLOOD PRESSURE: 57 MMHG | SYSTOLIC BLOOD PRESSURE: 129 MMHG

## 2020-09-30 VITALS — DIASTOLIC BLOOD PRESSURE: 69 MMHG | SYSTOLIC BLOOD PRESSURE: 129 MMHG

## 2020-09-30 VITALS — DIASTOLIC BLOOD PRESSURE: 60 MMHG | SYSTOLIC BLOOD PRESSURE: 130 MMHG

## 2020-09-30 VITALS — DIASTOLIC BLOOD PRESSURE: 73 MMHG | SYSTOLIC BLOOD PRESSURE: 125 MMHG

## 2020-09-30 VITALS — DIASTOLIC BLOOD PRESSURE: 69 MMHG | SYSTOLIC BLOOD PRESSURE: 117 MMHG

## 2020-09-30 VITALS — DIASTOLIC BLOOD PRESSURE: 66 MMHG | SYSTOLIC BLOOD PRESSURE: 113 MMHG

## 2020-09-30 VITALS — SYSTOLIC BLOOD PRESSURE: 114 MMHG | DIASTOLIC BLOOD PRESSURE: 77 MMHG

## 2020-09-30 VITALS — SYSTOLIC BLOOD PRESSURE: 142 MMHG | DIASTOLIC BLOOD PRESSURE: 93 MMHG

## 2020-09-30 VITALS — SYSTOLIC BLOOD PRESSURE: 132 MMHG | DIASTOLIC BLOOD PRESSURE: 76 MMHG

## 2020-09-30 LAB
ALBUMIN SERPL-MCNC: 1.4 G/DL (ref 3.4–5)
ALBUMIN/GLOB SERPL: 0.4 {RATIO} (ref 1–2.7)
ALP SERPL-CCNC: 66 U/L (ref 46–116)
ALT SERPL-CCNC: 37 U/L (ref 12–78)
ANION GAP SERPL CALC-SCNC: 6 MMOL/L (ref 5–15)
AST SERPL-CCNC: 23 U/L (ref 15–37)
BILIRUB SERPL-MCNC: 0.7 MG/DL (ref 0.2–1)
BUN SERPL-MCNC: 20 MG/DL (ref 7–18)
CALCIUM SERPL-MCNC: 7.6 MG/DL (ref 8.5–10.1)
CHLORIDE SERPL-SCNC: 113 MMOL/L (ref 98–107)
CO2 SERPL-SCNC: 28 MMOL/L (ref 21–32)
CREAT SERPL-MCNC: 0.7 MG/DL (ref 0.55–1.3)
GLOBULIN SER-MCNC: 3.9 G/DL
PHOSPHATE SERPL-MCNC: 2.3 MG/DL (ref 2.5–4.9)
POTASSIUM SERPL-SCNC: 3.8 MMOL/L (ref 3.5–5.1)
SODIUM SERPL-SCNC: 147 MMOL/L (ref 136–145)

## 2020-09-30 PROCEDURE — 0B21XEZ CHANGE ENDOTRACHEAL AIRWAY IN TRACHEA, EXTERNAL APPROACH: ICD-10-PCS

## 2020-09-30 RX ADMIN — INSULIN ASPART SCH UNITS: 100 INJECTION, SOLUTION INTRAVENOUS; SUBCUTANEOUS at 05:58

## 2020-09-30 RX ADMIN — HYDROCORTISONE SODIUM SUCCINATE SCH MG: 100 INJECTION, POWDER, FOR SOLUTION INTRAMUSCULAR; INTRAVENOUS at 22:00

## 2020-09-30 RX ADMIN — HYDROCORTISONE SODIUM SUCCINATE SCH MG: 100 INJECTION, POWDER, FOR SOLUTION INTRAMUSCULAR; INTRAVENOUS at 05:56

## 2020-09-30 RX ADMIN — INSULIN ASPART SCH UNITS: 100 INJECTION, SOLUTION INTRAVENOUS; SUBCUTANEOUS at 23:56

## 2020-09-30 RX ADMIN — INSULIN DETEMIR SCH UNITS: 100 INJECTION, SOLUTION SUBCUTANEOUS at 08:51

## 2020-09-30 RX ADMIN — INSULIN DETEMIR SCH UNITS: 100 INJECTION, SOLUTION SUBCUTANEOUS at 20:57

## 2020-09-30 RX ADMIN — HYDROCORTISONE SODIUM SUCCINATE SCH MG: 100 INJECTION, POWDER, FOR SOLUTION INTRAMUSCULAR; INTRAVENOUS at 14:11

## 2020-09-30 RX ADMIN — PANTOPRAZOLE SODIUM SCH MG: 40 INJECTION, POWDER, FOR SOLUTION INTRAVENOUS at 08:49

## 2020-09-30 RX ADMIN — INSULIN ASPART SCH UNITS: 100 INJECTION, SOLUTION INTRAVENOUS; SUBCUTANEOUS at 13:12

## 2020-09-30 RX ADMIN — CHLORHEXIDINE GLUCONATE SCH APPLIC: 213 SOLUTION TOPICAL at 20:55

## 2020-09-30 RX ADMIN — INSULIN ASPART SCH UNITS: 100 INJECTION, SOLUTION INTRAVENOUS; SUBCUTANEOUS at 18:00

## 2020-09-30 RX ADMIN — PANTOPRAZOLE SODIUM SCH MG: 40 INJECTION, POWDER, FOR SOLUTION INTRAVENOUS at 20:56

## 2020-09-30 NOTE — NUR
NURSE NOTES:

DR RICH IN AND REINTUBATE PT AND TOLERATE WILL  CHEST X-RAY DONE AND ETT WAS PULL BACK 2 CM 
POR ORDER  RODIOLOGY AND REPEAT CHEST X-RAY DONE

## 2020-09-30 NOTE — CARDIOLOGY PROGRESS NOTE
Assessment/Plan


Assessment/Plan


sepsis


respiratory failure 


ards 


renal insuf 


bacteremia


abn cardiac enzyme due to demand 


sinus arnold stable 


thrombocytopenia resolved  


hypernatremia 


pneumothorax now s/p chest tube 











vent support 


abx 


may be mobilizing her fluids


ct in place 


tsh seems fine 


remains off pressor still at this time 


hr inthe 60's s no sig pauses on tele 


tele personally reviewed 


aw[place] public guardian for code status adn soncent for trach 


d/w rn 


 








Subjective


ROS Limited/Unobtainable:  Yes


Subjective


on  a vent not communicative AWAKE





Objective





Last 24 Hour Vital Signs








  Date Time  Temp Pulse Resp B/P (MAP) Pulse Ox O2 Delivery O2 Flow Rate FiO2


 


9/30/20 17:00  68 32 125/83 (97) 93   


 


9/30/20 16:00  52 22 104/88 (93) 94   


 


9/30/20 16:00      Mechanical Ventilator  





      Mechanical Ventilator  





      Mechanical Ventilator  


 


9/30/20 16:00        60


 


9/30/20 15:41  52      


 


9/30/20 15:15  54 24     60


 


9/30/20 15:00  43 24 129/57 (81) 95   


 


9/30/20 14:00  43 25 118/59 (78) 94   


 


9/30/20 13:00  48 24 130/60 (83) 95   


 


9/30/20 12:00        60


 


9/30/20 12:00  46 24 152/68 (96) 96   


 


9/30/20 12:00      Mechanical Ventilator  





      Mechanical Ventilator  





      Mechanical Ventilator  


 


9/30/20 11:35  61      


 


9/30/20 11:06  61 24     60


 


9/30/20 11:00  52 21 142/93 (109) 94   


 


9/30/20 10:00  49 26 140/75 (96) 95   


 


9/30/20 09:00  63 24 117/69 (85) 96   


 


9/30/20 08:00        60


 


9/30/20 08:00 98.3 53 27 141/72 (95) 96   


 


9/30/20 08:00      Mechanical Ventilator  





      Mechanical Ventilator  





      Mechanical Ventilator  


 


9/30/20 07:41  59      


 


9/30/20 07:11  45 24     60


 


9/30/20 07:00  54 25 125/76 (92) 97   


 


9/30/20 06:00  55 25 125/73 (90) 96   


 


9/30/20 05:00  64 27 100/87 (91) 98   


 


9/30/20 04:00  60      


 


9/30/20 04:00      Mechanical Ventilator  





      Mechanical Ventilator  





      Mechanical Ventilator  


 


9/30/20 04:00        60


 


9/30/20 04:00 98.0 51 20 113/66 (82) 85   


 


9/30/20 03:14  69 24     60


 


9/30/20 03:00  46 18 124/59 (80)    


 


9/30/20 02:00  53 20 129/69 (89) 93   


 


9/30/20 01:00  72 23 137/74 (95) 93   


 


9/30/20 00:00  58      


 


9/30/20 00:00      Mechanical Ventilator  





      Mechanical Ventilator  





      Mechanical Ventilator  


 


9/30/20 00:00 98.6 56 30 108/71 (83) 95   


 


9/30/20 00:00        60


 


9/29/20 23:00  67 29 117/57 (77) 99   


 


9/29/20 22:55  52 24     60


 


9/29/20 22:00  70 23 133/49 (77) 98   


 


9/29/20 21:00  50 24 122/79 (93) 97   


 


9/29/20 20:00 98.5 68 25 126/51 (76) 97   


 


9/29/20 20:00  54      


 


9/29/20 20:00        60


 


9/29/20 20:00      Mechanical Ventilator  





      Mechanical Ventilator  





      Mechanical Ventilator  


 


9/29/20 19:30  54 24     60


 


9/29/20 19:00  61 20 99/51 (67) 99   


 


9/29/20 18:00  57 21 95/66 (76) 98   








General Appearance:  no apparent distress, alert, patient on isolation, 

isolation precautions


Cardiovascular:  normal rate, bradycardia


Respiratory/Chest:  crackles/rales, rhonchi - bilaterally


Abdomen:  normal bowel sounds, non tender, soft


Extremities:  moderate edema











Intake and Output  


 


 9/29/20 9/30/20





 19:00 07:00


 


Intake Total 800 ml 700 ml


 


Output Total 540 ml 540 ml


 


Balance 260 ml 160 ml


 


  


 


Free Water  200 ml


 


IV Total 100 ml 


 


Tube Feeding 600 ml 500 ml


 


Other 100 ml 


 


Output Urine Total 530 ml 540 ml


 


Chest Tube Drainage Total 10 ml 


 


# Bowel Movements 2 3











Laboratory Tests








Test


 9/29/20


20:28 9/29/20


23:30 9/30/20


04:20 9/30/20


05:51


 


POC Whole Blood Glucose


 Pending  


 Pending  


 


 157 MG/DL


()  H


 


Sodium Level


 


 


 147 MMOL/L


(136-145)  H 





 


Potassium Level


 


 


 3.8 MMOL/L


(3.5-5.1) 





 


Chloride Level


 


 


 113 MMOL/L


()  H 





 


Carbon Dioxide Level


 


 


 28 MMOL/L


(21-32) 





 


Anion Gap


 


 


 6 mmol/L


(5-15) 





 


Blood Urea Nitrogen


 


 


 20 mg/dL


(7-18)  H 





 


Creatinine


 


 


 0.7 MG/DL


(0.55-1.30) 





 


Estimat Glomerular Filtration


Rate 


 


 > 60 mL/min


(>60) 





 


Glucose Level


 


 


 144 MG/DL


()  H 





 


Calcium Level


 


 


 7.6 MG/DL


(8.5-10.1)  L 





 


Phosphorus Level


 


 


 2.3 MG/DL


(2.5-4.9)  L 





 


Magnesium Level


 


 


 1.8 MG/DL


(1.8-2.4) 





 


Total Bilirubin


 


 


 0.7 MG/DL


(0.2-1.0) 





 


Aspartate Amino Transf


(AST/SGOT) 


 


 23 U/L (15-37)


 





 


Alanine Aminotransferase


(ALT/SGPT) 


 


 37 U/L (12-78)


 





 


Alkaline Phosphatase


 


 


 66 U/L


() 





 


Total Protein


 


 


 5.3 G/DL


(6.4-8.2)  L 





 


Albumin


 


 


 1.4 G/DL


(3.4-5.0)  L 





 


Globulin   3.9 g/dL   


 


Albumin/Globulin Ratio


 


 


 0.4 (1.0-2.7)


L 





 


Test


 9/30/20


08:41 9/30/20


13:10 


 





 


POC Whole Blood Glucose


 Pending  


 133 MG/DL


()  H 


 




















Constantine Jorgensen MD          Sep 30, 2020 17:53

## 2020-09-30 NOTE — NUR
CASE MANAGEMENT: REVIEW 



9/30/2020



SI;SEPSIS. PNA. 

98.3   53   27   141/72   96% ON MECH VENT FIO2 60

H/H 9.5/27.6         BUN 23      CA+ 7.3   ALB 1.3 



IS:IV SOLU-CORTEF TID 

 LEVEMIR SQ BID

IV PROTONIX BID

K-DUR GT BID



**ICU** 



PLAN OF CARE: 

MAINTAIN CHEST TUBE 

BIOETHIC MEETING 

CONSENT FOR TRACHEOSTOMY

## 2020-09-30 NOTE — NUR
NURSE HAND-OFF REPORT: 



Latest Vital Signs: Temperature 98.9 , Pulse 43 , B/P 137 /73 , Respiratory Rate 24 , O2 SAT 
94 , Mechanical Ventilator, O2 Flow Rate 15.0 .  

Vital Sign Comment: right chest tube connected to low continuous suction. 



EKG Rhythm: Sinus Bradycardia

Rhythm change?: N 



Latest Momin Fall Score: 70  

Fall Risk: High Risk 

Safety Measures: Call light Within Reach, Bed Alarm Zone 1, Side Rails Side Rails x3, Bed 
position Low and Locked.

Fall Precautions: 

Yellow Socks

Door Sign

Patient Fall Education

Contact isolation endorsed.

Report given to Snehal CHANEL RN.

## 2020-09-30 NOTE — NUR
NURSE NOTES:



Spoke with Dr. MARQUEZ. Read today's chest xray result: Compared to the prior exam, the chest 
tube at the right lung base is perhaps slightly advanced as seen on this frontal projection 
only. Dr. MARQUEZ said to continue low continuous right chest tube suction. Continuous air 
bubbling in plerovac reported to Dr. MARQUEZ.

## 2020-09-30 NOTE — INTERNAL MED PROGRESS NOTE
Subjective


Date of Service:  Sep 30, 2020


Physician Name


Sanya Schultz


Attending Physician


Chano Cherry MD





Current Medications








 Medications


  (Trade)  Dose


 Ordered  Sig/Didi


 Route


 PRN Reason  Start Time


 Stop Time Status Last Admin


Dose Admin


 


 Acetaminophen


  (Tylenol)  650 mg  Q4H  PRN


 GT


 For Pain  9/23/20 17:15


 10/23/20 17:14  9/29/20 17:33





 


 Chlorhexidine


 Gluconate


  (Beatrice-Hex 2%)  1 applic  DAILY@2000


 TOPIC


   8/31/20 20:00


 11/29/20 19:59  9/29/20 20:34





 


 Clotrimazole


  (Lotrimin)  1 applic  Q12HR


 TOPIC


   8/30/20 13:00


 11/28/20 12:59  9/30/20 08:49





 


 Dextrose


  (Dextrose 50%)  25 ml  Q30M  PRN


 IV


 Hypoglycemia  8/26/20 11:30


 11/20/20 11:29   





 


 Dextrose


  (Dextrose 50%)  50 ml  Q30M  PRN


 IV


 Hypoglycemia  8/26/20 11:30


 11/20/20 11:29   





 


 Hydrocortisone


  (Solu-CORTEF)  50 mg  EVERY 8  HOURS


 IV


   9/28/20 06:00


 12/27/20 05:59  9/30/20 05:56





 


 Insulin Aspart


  (NovoLOG)    Q6HR


 SUBQ


   9/18/20 12:00


 12/17/20 11:59  9/30/20 05:58





 


 Insulin Detemir


  (Levemir)  10 units  Q12HR


 SUBQ


   9/18/20 10:00


 12/17/20 09:59  9/30/20 08:51





 


 Levothyroxine


 Sodium


  (Synthroid)  75 mcg  DAILY@0630


 GT


   9/30/20 06:30


 10/30/20 06:29  9/30/20 05:58





 


 Loperamide HCl


  (Imodium)  2 mg  Q6H  PRN


 NG


 Diarrhea  9/16/20 10:30


 10/16/20 10:29  9/23/20 11:41





 


 Pantoprazole


  (Protonix)  40 mg  EVERY 12  HOURS


 IVP


   9/28/20 21:00


 10/28/20 20:59  9/30/20 08:49





 


 Potassium Chloride


  (K-Dur)  40 meq  EVERY 12  HOURS


 GT


   9/26/20 21:00


 12/19/20 12:14  9/30/20 08:49











Allergies:  


Coded Allergies:  


     No Known Allergies (Unverified , 1/8/19)


ROS Limited/Unobtainable:  Yes


Subjective


60 YO F with Down's syndrome admitted with hypoxia.  Now sepsis and pneumonia.  

Cover for Int Phi-DR Hung.  ICU.  Intubated and sedated





Objective





Last Vital Signs








  Date Time  Temp Pulse Resp B/P (MAP) Pulse Ox O2 Delivery O2 Flow Rate FiO2


 


9/30/20 12:00        60


 


9/30/20 12:00  46 24 152/68 (96) 96   


 


9/30/20 12:00      Mechanical Ventilator  





      Mechanical Ventilator  





      Mechanical Ventilator  


 


9/30/20 08:00 98.3       











Laboratory Tests








Test


 9/29/20


17:25 9/29/20


20:28 9/29/20


23:30 9/30/20


04:20


 


POC Whole Blood Glucose


 102 MG/DL


() Pending  


 Pending  


 





 


Sodium Level


 


 


 


 147 MMOL/L


(136-145)  H


 


Potassium Level


 


 


 


 3.8 MMOL/L


(3.5-5.1)


 


Chloride Level


 


 


 


 113 MMOL/L


()  H


 


Carbon Dioxide Level


 


 


 


 28 MMOL/L


(21-32)


 


Anion Gap


 


 


 


 6 mmol/L


(5-15)


 


Blood Urea Nitrogen


 


 


 


 20 mg/dL


(7-18)  H


 


Creatinine


 


 


 


 0.7 MG/DL


(0.55-1.30)


 


Estimat Glomerular Filtration


Rate 


 


 


 > 60 mL/min


(>60)


 


Glucose Level


 


 


 


 144 MG/DL


()  H


 


Calcium Level


 


 


 


 7.6 MG/DL


(8.5-10.1)  L


 


Phosphorus Level


 


 


 


 2.3 MG/DL


(2.5-4.9)  L


 


Magnesium Level


 


 


 


 1.8 MG/DL


(1.8-2.4)


 


Total Bilirubin


 


 


 


 0.7 MG/DL


(0.2-1.0)


 


Aspartate Amino Transf


(AST/SGOT) 


 


 


 23 U/L (15-37)





 


Alanine Aminotransferase


(ALT/SGPT) 


 


 


 37 U/L (12-78)





 


Alkaline Phosphatase


 


 


 


 66 U/L


()


 


Total Protein


 


 


 


 5.3 G/DL


(6.4-8.2)  L


 


Albumin


 


 


 


 1.4 G/DL


(3.4-5.0)  L


 


Globulin    3.9 g/dL  


 


Albumin/Globulin Ratio


 


 


 


 0.4 (1.0-2.7)


L

















Intake and Output  


 


 9/29/20 9/30/20





 19:00 07:00


 


Intake Total 800 ml 700 ml


 


Output Total 540 ml 540 ml


 


Balance 260 ml 160 ml


 


  


 


Free Water  200 ml


 


IV Total 100 ml 


 


Tube Feeding 600 ml 500 ml


 


Other 100 ml 


 


Output Urine Total 530 ml 540 ml


 


Chest Tube Drainage Total 10 ml 


 


# Bowel Movements 2 3








Objective


General Appearance:  WD/WN, no apparent distress, alert


EENT:  PERRL/EOMI, normal ENT inspection


Neck:  non-tender, normal alignment, supple, normal inspection


Cardiovascular:  normal peripheral pulses, normal rate, regular rhythm, no 

gallop/murmur, no JVD


Respiratory/Chest:  Mech vent; decreased breath sounds, crackles/rales, rhonchi 

- bilaterally, expiratory wheezing


Abdomen:  normal bowel sounds, non tender, soft, no organomegaly, no mass


Extremities:  normal range of motion


Neurologic:  CNs II-XII grossly normal


Skin:  normal pigmentation, warm/dry





Assessment/Plan


Problem List:  


(1) HCAP (healthcare-associated pneumonia)


Assessment & Plan:  Strep Group G.  S/P amikacin per ID=Dr Gomes.  

Pulmonary/Critical care=DR Cherry.  COVID NEG





(2) Sepsis


Assessment & Plan:  Staph haemolyticus.  S/P amikacin per ID=Dr Gomes





(3) Down's syndrome


(4) Dysphagia


Assessment & Plan:  S/P PEG





(5) Seizure disorder


Assessment & Plan:  Continue keppra and depakote





(6) Hypothyroidism


Assessment & Plan:  Continue synthroid





(7) Acute respiratory failure


Assessment & Plan:  Pulmonary = Dr Cherry; mech vent





(8) Pneumothorax, right


Assessment & Plan:  Continue chest tube per pulmonary





(9) Cardiac arrest


Assessment & Plan:  8/26/20-see cardiology note=Sanya Dunn MD               Sep 30, 2020 13:06

## 2020-09-30 NOTE — EMERGENCY ROOM REPORT
History of Present Illness


General


Chief Complaint:  Dyspnea/Respdistress


Source:  Medical Record





Present Illness


Allergies:  


Coded Allergies:  


     No Known Allergies (Unverified , 1/8/19)





COVID-19 Screening


Contact w/high risk pt:  No


Experienced COVID-19 symptoms?:  No


COVID-19 Testing performed PTA:  Yes


COVID-19 Screening:  Negative COVID-19


COVID-19 Testing Source:  08/10/20





Nursing Documentation-[facility]


Past Medical History:  No History, Except For


Hx Cardiac Problems:  No - PVD


Hx Diabetes:  No - Hypothyroid


Hx Cancer:  No


Hx Gastrointestinal Problems:  No - gastrostomy


Hx Neurological Problems:  Yes - down syndrome


Hx Seizures:  Yes


Hx Speech Problem:  Yes





Physical Exam





Vital Signs








  Date Time  Temp Pulse Resp B/P (MAP) Pulse Ox O2 Delivery O2 Flow Rate FiO2


 


9/25/20 03:59  65 26     80


 


9/25/20 04:00      Mechanical Ventilator  





      Mechanical Ventilator  


 


9/25/20 04:00 98.2   138/71 (93) 96   











Procedures


Intubation


Intubation :  


   Consent:  Emergent


   Tube Size (cm):  7.5


   Medications:  Etomidate, Rocuronium


   Breath Sounds after Intubation:  equal


   Post Intubation Xray:  Yes


   Attempts:  One


   Patient Tolerated:  Well


   Complications:  None





Medical Decision Making


Diagnostic Impression:  


   Primary Impression:  


   ARDS (adult respiratory distress syndrome)


   Additional Impressions:  


   HCAP (healthcare-associated pneumonia)


   Septic shock


   Respiratory failure requiring intubation


ER Course


Total critical care time: Approximately 15 minutes





Due to a high probability of clinically significant, life threatening 

deterioration, the patient required the highest level of preparedness to 

intervene emergently and I personally spent this critical care time directly and

personally managing the patient. This critical care time included obtaining a 

history, examining the patient, pulse oximetry, ordering and reviewing studies, 

ordering treatments, evaluating response to treatment and updating management 

plan as needed, frequent reassessment and discussion with other providers as 

well as arranging for ultimate disposition. This critical to care time was perf

ormed to assess and manage the high probability of life-threatening 

deterioration that could result in multiorgan failure. This critical care time 

is separate from the separately billable procedures and treating other patients.





I was called to the ICU as the cause of the patient's endotracheal tube appeared

to have deflated.  When arrived to the ICU the patient had normal oxygen s

aturation and did not appear to be in any respiratory distress on the 

ventilator.  The patient's ETT cuff could not be inflated.  Patient was actively

biting the tube.  She was given etomidate and rocuronium.  The bougie was passed

through the patient's endotracheal tube which was easily removed and a new 7.5 

endotracheal tube was replaced over the bougie.  Placed at 22 cm at the lip.  

Patient never had any episodes of hypoxia or respiratory distress during the 

procedure.  Post intubation chest x-ray was ordered.





Last Vital Signs








  Date Time  Temp Pulse Resp B/P (MAP) Pulse Ox O2 Delivery O2 Flow Rate FiO2


 


9/30/20 02:00  53 20 129/69 (89) 93   


 


9/30/20 00:00      Mechanical Ventilator  





      Mechanical Ventilator  





      Mechanical Ventilator  


 


9/30/20 00:00 98.6       


 


9/29/20 22:55        60








Disposition:  ADMITTED AS INPATIENT


Condition:  Critical


Referrals:  


NON PHYSICIAN (PCP)











Shay Brush M.D.                Sep 30, 2020 03:08

## 2020-09-30 NOTE — DIAGNOSTIC IMAGING REPORT
EXAM:

  XR Chest, 1 View

 

CLINICAL HISTORY:

  TUBE PLCMT

 

TECHNIQUE:

  Frontal view of the chest.

 

COMPARISON:

  9/30/2020

 

IMPRESSION:     

 

ET tube terminates 3.5 cm from the lisandro.

## 2020-09-30 NOTE — CONSULTATION
History of Present Illness


General


Date patient seen:  Sep 30, 2020


Chief Complaint:  Dyspnea/Respdistress





Present Illness


HPI


This is a very unfortunate 60-year-old female with history of Down syndrome 

multiple comorbidities who presented to [facility] sometime ago with

respiratory insufficiency and shortness of breath admitted further care and 

management currently intubated intensive care unit at [facility] 

under medical and ICU care teams.  Patient has not been able to safely wean off 

ventilator support despite multiple attempts.  Given her mental status and 

current condition safe extubation despite minimal events vent settings is conc

erning.  Surgery was called to evaluate for tracheostomy placement.  Patient 

seen, patient evaluate, chart reviewed.  Care plan discussed with medical 

pulmonary and ICU teams.


Allergies:  


Coded Allergies:  


     No Known Allergies (Unverified , 1/8/19)





Medication History


Scheduled


Ascorbic Acid* (Vitamin C*), 500 MG GT DAILY, (Reported)


Baclofen* (Baclofen*), 10 MG GT Q8HR, (Reported)


Docusate Sodium* (Docusate Sodium*), 100 MG GT TWICE A DAY, (Reported)


Ipratropium/Albuterol Sulfate (DuoNeb 0.5-3(2.5)mg/3ml), 3 ML HHN Q4HR, 

(Reported)


Levetiracetam (Keppra), 750 MG GT TWICE A DAY, (Reported)


Levothyroxine Sodium* (Levothyroxine Sodium*), 75 MCG GT DAILY, (Reported)


Multivitamin With Minerals (Multivitamins With Minerals*), 1 TAB GT DAILY, (Repo

rted)


Valproate Sodium (Valproic Acid), 500 MG GT DAILY, (Reported)


Vitamin D (Vitamin D3), 1,000 UNITS GT DAILY, (Reported)





Scheduled PRN


Acetaminophen 160MG/5ML* (Acetaminophen*), 20 ML GT DAILY PRN for For Pain, 

(Reported)





Patient History


Limited by:  medical condition


History Provided By:  Medical Record, PMD


Healthcare decision maker





Resuscitation status





Advanced Directive on File








Past Medical/Surgical History


Past Medical/Surgical History:  


(1) Respiratory distress


(2) Encephalopathy chronic


(3) Dysphagia


(4) Down's syndrome


(5) Sepsis


(6) Acute respiratory failure


(7) Pneumonia


(8) Trisomy 21, Down syndrome


(9) DAVID (acute kidney injury)


(10) Bacteremia


(11) Hypokalemia


(12) Anemia


(13) Diarrhea


(14) Hypernatremia


(15) Pneumothorax


(16) Pneumothorax, right


(17) Cardiac arrest


(18) ARDS (adult respiratory distress syndrome)


(19) Septic shock


(20) HCAP (healthcare-associated pneumonia)


(21) Respiratory failure requiring intubation


(22) Seizure disorder


(23) Hypothyroidism





Review of Systems


ROS Narrative


unable to obtain given medical condition





Physical Exam


General Appearance:  mild distress


Lines, tubes and drains:  peripheral, PICC, endotracheal tube, other


HEENT:  normocephalic, atraumatic, anicteric, mucous membranes moist


Neck:  supple, normal inspection


Respiratory/Chest:  decreased breath sounds, on vent


Cardiovascular/Chest:  normal peripheral pulses, normal rate, regular rhythm


Abdomen:  soft, no organomegaly, no mass, other


Extremities:  non-tender, normal inspection, no calf tenderness, normal 

capillary refill


Skin Exam:  warm/dry


Neurologic:  unresponsiveness





Last 24 Hour Vital Signs








  Date Time  Temp Pulse Resp B/P (MAP) Pulse Ox O2 Delivery O2 Flow Rate FiO2


 


9/30/20 13:00  48 24 130/60 (83) 95   


 


9/30/20 12:00        60


 


9/30/20 12:00  46 24 152/68 (96) 96   


 


9/30/20 12:00      Mechanical Ventilator  





      Mechanical Ventilator  





      Mechanical Ventilator  


 


9/30/20 11:35  61      


 


9/30/20 11:06  61 24     60


 


9/30/20 11:00  52 21 142/93 (109) 94   


 


9/30/20 10:00  49 26 140/75 (96) 95   


 


9/30/20 09:00  63 24 117/69 (85) 96   


 


9/30/20 08:00        60


 


9/30/20 08:00 98.3 53 27 141/72 (95) 96   


 


9/30/20 08:00      Mechanical Ventilator  





      Mechanical Ventilator  





      Mechanical Ventilator  


 


9/30/20 07:41  59      


 


9/30/20 07:11  45 24     60


 


9/30/20 07:00  54 25 125/76 (92) 97   


 


9/30/20 06:00  55 25 125/73 (90) 96   


 


9/30/20 05:00  64 27 100/87 (91) 98   


 


9/30/20 04:00  60      


 


9/30/20 04:00      Mechanical Ventilator  





      Mechanical Ventilator  





      Mechanical Ventilator  


 


9/30/20 04:00        60


 


9/30/20 04:00 98.0 51 20 113/66 (82) 85   


 


9/30/20 03:14  69 24     60


 


9/30/20 03:00  46 18 124/59 (80)    


 


9/30/20 02:00  53 20 129/69 (89) 93   


 


9/30/20 01:00  72 23 137/74 (95) 93   


 


9/30/20 00:00  58      


 


9/30/20 00:00      Mechanical Ventilator  





      Mechanical Ventilator  





      Mechanical Ventilator  


 


9/30/20 00:00 98.6 56 30 108/71 (83) 95   


 


9/30/20 00:00        60


 


9/29/20 23:00  67 29 117/57 (77) 99   


 


9/29/20 22:55  52 24     60


 


9/29/20 22:00  70 23 133/49 (77) 98   


 


9/29/20 21:00  50 24 122/79 (93) 97   


 


9/29/20 20:00 98.5 68 25 126/51 (76) 97   


 


9/29/20 20:00  54      


 


9/29/20 20:00        60


 


9/29/20 20:00      Mechanical Ventilator  





      Mechanical Ventilator  





      Mechanical Ventilator  


 


9/29/20 19:30  54 24     60


 


9/29/20 19:00  61 20 99/51 (67) 99   


 


9/29/20 18:00  57 21 95/66 (76) 98   


 


9/29/20 17:00  66 28 102/57 (72) 96   


 


9/29/20 16:00      Mechanical Ventilator  





      Mechanical Ventilator  





      Mechanical Ventilator  


 


9/29/20 16:00        60


 


9/29/20 16:00 98.0 55 18 129/55 (79) 95   


 


9/29/20 15:25  51      


 


9/29/20 15:03  65 24     60


 


9/29/20 15:00  55 24 121/76 (91) 96   


 


9/29/20 14:00  53 20 99/55 (70) 98   

















Intake and Output  


 


 9/29/20 9/30/20





 19:00 07:00


 


Intake Total 800 ml 700 ml


 


Output Total 540 ml 540 ml


 


Balance 260 ml 160 ml


 


  


 


Free Water  200 ml


 


IV Total 100 ml 


 


Tube Feeding 600 ml 500 ml


 


Other 100 ml 


 


Output Urine Total 530 ml 540 ml


 


Chest Tube Drainage Total 10 ml 


 


# Bowel Movements 2 3











Laboratory Tests








Test


 9/29/20


17:25 9/29/20


20:28 9/29/20


23:30 9/30/20


04:20


 


POC Whole Blood Glucose


 102 MG/DL


() Pending  


 Pending  


 





 


Sodium Level


 


 


 


 147 MMOL/L


(136-145)  H


 


Potassium Level


 


 


 


 3.8 MMOL/L


(3.5-5.1)


 


Chloride Level


 


 


 


 113 MMOL/L


()  H


 


Carbon Dioxide Level


 


 


 


 28 MMOL/L


(21-32)


 


Anion Gap


 


 


 


 6 mmol/L


(5-15)


 


Blood Urea Nitrogen


 


 


 


 20 mg/dL


(7-18)  H


 


Creatinine


 


 


 


 0.7 MG/DL


(0.55-1.30)


 


Estimat Glomerular Filtration


Rate 


 


 


 > 60 mL/min


(>60)


 


Glucose Level


 


 


 


 144 MG/DL


()  H


 


Calcium Level


 


 


 


 7.6 MG/DL


(8.5-10.1)  L


 


Phosphorus Level


 


 


 


 2.3 MG/DL


(2.5-4.9)  L


 


Magnesium Level


 


 


 


 1.8 MG/DL


(1.8-2.4)


 


Total Bilirubin


 


 


 


 0.7 MG/DL


(0.2-1.0)


 


Aspartate Amino Transf


(AST/SGOT) 


 


 


 23 U/L (15-37)





 


Alanine Aminotransferase


(ALT/SGPT) 


 


 


 37 U/L (12-78)





 


Alkaline Phosphatase


 


 


 


 66 U/L


()


 


Total Protein


 


 


 


 5.3 G/DL


(6.4-8.2)  L


 


Albumin


 


 


 


 1.4 G/DL


(3.4-5.0)  L


 


Globulin    3.9 g/dL  


 


Albumin/Globulin Ratio


 


 


 


 0.4 (1.0-2.7)


L


 


Test


 9/30/20


05:51 9/30/20


08:41 9/30/20


13:10 





 


POC Whole Blood Glucose


 157 MG/DL


()  H Pending  


 133 MG/DL


()  H 











Height (Feet):  5


Height (Inches):  3.00


Weight (Pounds):  172


Medications





Current Medications








 Medications


  (Trade)  Dose


 Ordered  Sig/Didi


 Route


 PRN Reason  Start Time


 Stop Time Status Last Admin


Dose Admin


 


 Acetaminophen


  (Tylenol)  650 mg  Q4H  PRN


 GT


 For Pain  9/23/20 17:15


 10/23/20 17:14  9/29/20 17:33





 


 Chlorhexidine


 Gluconate


  (Beatrice-Hex 2%)  1 applic  DAILY@2000


 TOPIC


   8/31/20 20:00


 11/29/20 19:59  9/29/20 20:34





 


 Clotrimazole


  (Lotrimin)  1 applic  Q12HR


 TOPIC


   8/30/20 13:00


 11/28/20 12:59  9/30/20 08:49





 


 Dextrose


  (Dextrose 50%)  25 ml  Q30M  PRN


 IV


 Hypoglycemia  8/26/20 11:30


 11/20/20 11:29   





 


 Dextrose


  (Dextrose 50%)  50 ml  Q30M  PRN


 IV


 Hypoglycemia  8/26/20 11:30


 11/20/20 11:29   





 


 Hydrocortisone


  (Solu-CORTEF)  50 mg  EVERY 8  HOURS


 IV


   9/28/20 06:00


 12/27/20 05:59  9/30/20 05:56





 


 Insulin Aspart


  (NovoLOG)    Q6HR


 SUBQ


   9/18/20 12:00


 12/17/20 11:59  9/30/20 05:58





 


 Insulin Detemir


  (Levemir)  10 units  Q12HR


 SUBQ


   9/18/20 10:00


 12/17/20 09:59  9/30/20 08:51





 


 Levothyroxine


 Sodium


  (Synthroid)  75 mcg  DAILY@0630


 GT


   9/30/20 06:30


 10/30/20 06:29  9/30/20 05:58





 


 Loperamide HCl


  (Imodium)  2 mg  Q6H  PRN


 NG


 Diarrhea  9/16/20 10:30


 10/16/20 10:29  9/23/20 11:41





 


 Pantoprazole


  (Protonix)  40 mg  EVERY 12  HOURS


 IVP


   9/28/20 21:00


 10/28/20 20:59  9/30/20 08:49





 


 Potassium Chloride


  (K-Dur)  40 meq  EVERY 12  HOURS


 GT


   9/26/20 21:00


 12/19/20 12:14  9/30/20 08:49














Assessment/Plan


Problem List:  


(1) Respiratory distress


Assessment & Plan:  Respiratory insufficiency requiring prolonged ventilator 

support.  Patient on minimal vent settings right now currently in stable but 

unfortunately not safe for extubation.  Tracheostomy is indicated recommended.  

I discussed case with pulmonology team ICU team medical teams.  Patient unable 

to make decisions and her current condition and given her history.  The care 

team has been contacted and will be reviewed for evaluation and consideration of

the tracheostomy.  If consented I think it is reasonable for tracheostomy given 

patient's current CODE STATUS care plan and goals of care.  Extubation his 

current status is potentially high risk for reintubation emergency complication.

 We will plan for tracheostomy if consent is obtained.  Thank you for let me 

participate patient's care will follow with recommendations


ICD Codes:  R06.03 - Acute respiratory distress


SNOMED:  722950311


(2) Encephalopathy chronic


ICD Codes:  G93.49 - Other encephalopathy


SNOMED:  96531203


(3) Dysphagia


ICD Codes:  R13.10 - Dysphagia, unspecified


SNOMED:  91368650, 408325429


(4) Down's syndrome


ICD Codes:  Q90.9 - Down syndrome, unspecified


SNOMED:  02361310, 640361183


(5) Sepsis


ICD Codes:  A41.9 - Sepsis, unspecified organism


SNOMED:  59060033


(6) Acute respiratory failure


ICD Codes:  J96.00 - Acute respiratory failure, unspecified whether with hypoxia

or hypercapnia


SNOMED:  84202749


(7) Pneumonia


ICD Codes:  J18.9 - Pneumonia, unspecified organism


SNOMED:  170461784


(8) Trisomy 21, Down syndrome


ICD Codes:  Q90.9 - Down syndrome, unspecified


SNOMED:  35924147


(9) DAVID (acute kidney injury)


ICD Codes:  N17.9 - Acute kidney failure, unspecified


SNOMED:  3240700, 44813973


(10) Bacteremia


ICD Codes:  R78.81 - Bacteremia


SNOMED:  0085883


(11) Hypokalemia


ICD Codes:  E87.6 - Hypokalemia


SNOMED:  84188168


(12) Anemia


ICD Codes:  D64.9 - Anemia, unspecified


SNOMED:  432792912


(13) Diarrhea


ICD Codes:  R19.7 - Diarrhea, unspecified


SNOMED:  98495312


(14) Hypernatremia


ICD Codes:  E87.0 - Hyperosmolality and hypernatremia


SNOMED:  881435167


(15) Pneumothorax


ICD Codes:  J93.9 - Pneumothorax, unspecified


SNOMED:  70886635


(16) Pneumothorax, right


ICD Codes:  J93.9 - Pneumothorax, unspecified


SNOMED:  988297978


(17) Cardiac arrest


ICD Codes:  I46.9 - Cardiac arrest, cause unspecified


SNOMED:  690565425


(18) ARDS (adult respiratory distress syndrome)


ICD Codes:  J80 - Acute respiratory distress syndrome


SNOMED:  84970303, 69919408


(19) Septic shock


ICD Codes:  A41.9 - Sepsis, unspecified organism; R65.21 - Severe sepsis with 

septic shock


SNOMED:  35503256, 67180714


(20) HCAP (healthcare-associated pneumonia)


ICD Codes:  J18.9 - Pneumonia, unspecified organism


SNOMED:  148933518, 912453559


(21) Respiratory failure requiring intubation


ICD Codes:  J96.90 - Respiratory failure, unspecified, unspecified whether with 

hypoxia or hypercapnia; R65.21 - Severe sepsis with septic shock


SNOMED:  669106891, 38632452


(22) Seizure disorder


ICD Codes:  G40.909 - Epilepsy, unspecified, not intractable, without status 

epilepticus


SNOMED:  [id number]


(23) Hypothyroidism


ICD Codes:  E03.9 - Hypothyroidism, unspecified


SNOMED:  [id number]











Jake Aranda                Sep 30, 2020 13:35

## 2020-09-30 NOTE — GENERAL PROGRESS NOTE
Progress Note


Progress Note


Bioethics Consult





The patient is a 61 yo developmentally disabled woman with Trisomy 21 

hospitalized











Alexis Diaz MD         Sep 30, 2020 13:14

## 2020-09-30 NOTE — DIAGNOSTIC IMAGING REPORT
Procedure: XRAY Chest 1v

Reason for study: Chest tube adjustment. Shortness of breath.

 

Comparison films:  9/30/2020 at 0136 hours. Present study at 0919 hours.

 

FINDINGS:

 

Endotracheal tube and left PICC line remain in place. A right chest tube again noted

at the right lung base. On this single frontal projection, the chest tube appears to

have been advanced slightly. 

 

Extensive bilateral alveolar densities otherwise unchanged. Cardiac and mediastinal

silhouette are within normal limits. Small effusions noted. The bony thorax appear

unremarkable.

 

IMPRESSION:  

 

Compared to the prior exam, the chest tube at the right lung base is perhaps slightly

advanced as seen on this frontal projection only.

 

Rest of study otherwise unchanged

## 2020-09-30 NOTE — INFECTIOUS DISEASES PROG NOTE
Assessment/Plan





ASSESSMENT:


sp code blue 8/26


Septic Shock; SP


Fever, recurrent- low grade 


Leukocytosis; persistent/fluctuating, SP


     -9/19 Bcx NTD


   -9/9 u/a no pyuria


   -9/8 Bcx NTD (Picc line)


   -9/6 Bcx NTD


            ucx Neg


             sp cx C. parapsilopsis


  -8/26 u/a no pyuria





Pneumonia.- COVID 19 neg x3


   Acute hypoxic resp failure on VM> NRB 15l 100%; hypoxic on ABG> now VDRF 

8/26- Fio2 80% >100% 8/28> 60% 9/1 >80% 9/2   >95% 9/10 >90% 9/14 >60% 9/15


R tension Pneumothroax sp CT 9/21


       -9/22 CXR: Interim complete reexpansion of right lung without evidence of

residual pneumothorax. Bilateral diffuse and extensive infiltrates. Markedly 

improved chest wall subcutaneous emphysema


       -9/21 CXR: Interim reexpansion of previously demonstrated right 

pneumothorax, status post large bore chest tube placement.


      -9/14 CXR: Improved aeration of both lungs.


       -9/5 CXR:Small bilateral pleural effusions with minor edema.  Stable 

edema with  mild worsening in the degree of pleural effusion on the right.


         -9/1 sp cx Neg


         8/31 CXR: Extensive bilateral interstitial and airspace disease appears

similar to the prior exam. Moderate to large bilateral pleural effusions appear 

unchanged.


   8/28 CXR: Increasing left upper lobe dense consolidation and likely 

increasing bilateral pleural fluid. Persistent diffuse dense consolidation el

sewhere


            -8/26 sp cx normal resp lilliam


             -8/25 CXR: Increased atelectasis of the right lung, since prior e

xam of 3 days earlier. New or increased right pleural effusion. Increased left 

basilar consolidation and/or pleural fluid 


          -COVID Rapid PCR neg 8/23, 8/23, 8/26


          -8/22 spc x Group G strep


          -8/22 CXR:   Reduced lung volumes.  Patchy bilateral predominantly 

interstitial  pulmonary opacities.  Could be from edema and/or pneumonia.  There

is a broader differential.


 


        -legionella ag urine, blasto ab, Histo ab, HIV ab screen, JOY, ANCA neg





Persistent, high grade bacteremia-


     -8/22 Bcx 4/4 sets S. haemolyticus; 8/23 Bcx 3/4 S/ epi; 8/27 Bcx 1.4 S. 

warnerri; 8/29 Bcx Neg


     -2d echo: no vegetaions seen





          ua/ wbc 10-15, nit neg, leuk +1; ucx Neg





DAVID; 


 -supratherapeutic vanco levels





-Seizure disorder.


- Hypothyroidism.


- Down syndrome.





History of PEG tube placement.


[facility] resident








PLAN:


Monitor off abx





          9/14 SP Meropenem #18, IV Amikacin #7


          9/4/20 SP Daptomycin #11


          9/2 SP MIcafungin #7, Linezolid #5


   8/28 SP Azithromycin #7/7


   8/26 SP Ceftriaxone #2


   8/25 SP IV Vancomycin #4, Zosyn #4


   8/22 SP Cefepime x1, Flagyl x1





- Monitor CBC, BMP.


.f/u cx


- COVID neg x3


- Monitor chest x-ray.


- Monitor the patient's clinical course and labs.  Based on those, we will do 

further recommendation.


-f/u Fungitell, asp ag, flow cytometry


-poor prognosis











Thank you, Dr. Cherry, for allowing me to participate in the care of


this patient.  I will follow the patient with you at this


hospitalization.








Discussed with RN





Subjective


Allergies:  


Coded Allergies:  


     No Known Allergies (Unverified , 1/8/19)





Afebrile


On Vent 60% O2 


No leukocytosis


DEVI





Objective





Last 24 Hour Vital Signs








  Date Time  Temp Pulse Resp B/P (MAP) Pulse Ox O2 Delivery O2 Flow Rate FiO2


 


9/30/20 11:06  61 24     60


 


9/30/20 11:00  52 21 142/93 (109) 94   


 


9/30/20 10:00  49 26 140/75 (96) 95   


 


9/30/20 09:00  63 24 117/69 (85) 96   


 


9/30/20 08:00        60


 


9/30/20 08:00 98.3 53 27 141/72 (95) 96   


 


9/30/20 08:00      Mechanical Ventilator  





      Mechanical Ventilator  





      Mechanical Ventilator  


 


9/30/20 07:41  59      


 


9/30/20 07:11  45 24     60


 


9/30/20 07:00  54 25 125/76 (92) 97   


 


9/30/20 06:00  55 25 125/73 (90) 96   


 


9/30/20 05:00  64 27 100/87 (91) 98   


 


9/30/20 04:00  60      


 


9/30/20 04:00      Mechanical Ventilator  





      Mechanical Ventilator  





      Mechanical Ventilator  


 


9/30/20 04:00        60


 


9/30/20 04:00 98.0 51 20 113/66 (82) 85   


 


9/30/20 03:14  69 24     60


 


9/30/20 03:00  46 18 124/59 (80)    


 


9/30/20 02:00  53 20 129/69 (89) 93   


 


9/30/20 01:00  72 23 137/74 (95) 93   


 


9/30/20 00:00  58      


 


9/30/20 00:00      Mechanical Ventilator  





      Mechanical Ventilator  





      Mechanical Ventilator  


 


9/30/20 00:00 98.6 56 30 108/71 (83) 95   


 


9/30/20 00:00        60


 


9/29/20 23:00  67 29 117/57 (77) 99   


 


9/29/20 22:55  52 24     60


 


9/29/20 22:00  70 23 133/49 (77) 98   


 


9/29/20 21:00  50 24 122/79 (93) 97   


 


9/29/20 20:00 98.5 68 25 126/51 (76) 97   


 


9/29/20 20:00  54      


 


9/29/20 20:00        60


 


9/29/20 20:00      Mechanical Ventilator  





      Mechanical Ventilator  





      Mechanical Ventilator  


 


9/29/20 19:30  54 24     60


 


9/29/20 19:00  61 20 99/51 (67) 99   


 


9/29/20 18:00  57 21 95/66 (76) 98   


 


9/29/20 17:00  66 28 102/57 (72) 96   


 


9/29/20 16:00      Mechanical Ventilator  





      Mechanical Ventilator  





      Mechanical Ventilator  


 


9/29/20 16:00        60


 


9/29/20 16:00 98.0 55 18 129/55 (79) 95   


 


9/29/20 15:25  51      


 


9/29/20 15:03  65 24     60


 


9/29/20 15:00  55 24 121/76 (91) 96   


 


9/29/20 14:00  53 20 99/55 (70) 98   


 


9/29/20 13:00  62 23 128/55 (79) 97   


 


9/29/20 12:00      Mechanical Ventilator  





      Mechanical Ventilator  





      Mechanical Ventilator  


 


9/29/20 12:00 98.3 59 25 102/59 (73) 97   


 


9/29/20 11:48  54      








Height (Feet):  5


Height (Inches):  3.00


Weight (Pounds):  172


GEN: NAD on Vent


HEENT: NCAT, Intubated, 


Pulm: Equal chest rise and fall B/L, No accessory muscle use


ABD: Soft, ND


SKIN: Exposed skin with no rash, Normal in color





Laboratory Tests








Test


 9/29/20


12:06 9/29/20


12:41 9/29/20


17:25 9/29/20


20:28


 


POC Whole Blood Glucose


 185 MG/DL


()  H 


 102 MG/DL


() Pending  





 


Arterial Blood pH


 


 7.508


(7.350-7.450) 


 





 


Arterial Blood Partial


Pressure CO2 


 37.2 mmHg


(35.0-45.0) 


 





 


Arterial Blood Partial


Pressure O2 


 89.8 mmHg


(75.0-100.0) 


 





 


Arterial Blood HCO3


 


 28.9 mmol/L


(22.0-26.0)  H 


 





 


Arterial Blood Oxygen


Saturation 


 96.6 %


() 


 





 


Arterial Blood Base Excess  5.5 (-2-2)  H  


 


Cole Test  Positive    


 


Test


 9/29/20


23:30 9/30/20


04:20 


 





 


POC Whole Blood Glucose Pending     


 


Sodium Level


 


 147 MMOL/L


(136-145)  H 


 





 


Potassium Level


 


 3.8 MMOL/L


(3.5-5.1) 


 





 


Chloride Level


 


 113 MMOL/L


()  H 


 





 


Carbon Dioxide Level


 


 28 MMOL/L


(21-32) 


 





 


Anion Gap


 


 6 mmol/L


(5-15) 


 





 


Blood Urea Nitrogen


 


 20 mg/dL


(7-18)  H 


 





 


Creatinine


 


 0.7 MG/DL


(0.55-1.30) 


 





 


Estimat Glomerular Filtration


Rate 


 > 60 mL/min


(>60) 


 





 


Glucose Level


 


 144 MG/DL


()  H 


 





 


Calcium Level


 


 7.6 MG/DL


(8.5-10.1)  L 


 





 


Phosphorus Level


 


 2.3 MG/DL


(2.5-4.9)  L 


 





 


Magnesium Level


 


 1.8 MG/DL


(1.8-2.4) 


 





 


Total Bilirubin


 


 0.7 MG/DL


(0.2-1.0) 


 





 


Aspartate Amino Transf


(AST/SGOT) 


 23 U/L (15-37)


 


 





 


Alanine Aminotransferase


(ALT/SGPT) 


 37 U/L (12-78)


 


 





 


Alkaline Phosphatase


 


 66 U/L


() 


 





 


Total Protein


 


 5.3 G/DL


(6.4-8.2)  L 


 





 


Albumin


 


 1.4 G/DL


(3.4-5.0)  L 


 





 


Globulin  3.9 g/dL    


 


Albumin/Globulin Ratio


 


 0.4 (1.0-2.7)


L 


 














Current Medications








 Medications


  (Trade)  Dose


 Ordered  Sig/Didi


 Route


 PRN Reason  Start Time


 Stop Time Status Last Admin


Dose Admin


 


 Acetaminophen


  (Tylenol)  650 mg  Q4H  PRN


 GT


 For Pain  9/23/20 17:15


 10/23/20 17:14  9/29/20 17:33





 


 Chlorhexidine


 Gluconate


  (Beatrice-Hex 2%)  1 applic  DAILY@2000


 TOPIC


   8/31/20 20:00


 11/29/20 19:59  9/29/20 20:34





 


 Clotrimazole


  (Lotrimin)  1 applic  Q12HR


 TOPIC


   8/30/20 13:00


 11/28/20 12:59  9/30/20 08:49





 


 Dextrose


  (Dextrose 50%)  25 ml  Q30M  PRN


 IV


 Hypoglycemia  8/26/20 11:30


 11/20/20 11:29   





 


 Dextrose


  (Dextrose 50%)  50 ml  Q30M  PRN


 IV


 Hypoglycemia  8/26/20 11:30


 11/20/20 11:29   





 


 Hydrocortisone


  (Solu-CORTEF)  50 mg  EVERY 8  HOURS


 IV


   9/28/20 06:00


 12/27/20 05:59  9/30/20 05:56





 


 Insulin Aspart


  (NovoLOG)    Q6HR


 SUBQ


   9/18/20 12:00


 12/17/20 11:59  9/30/20 05:58





 


 Insulin Detemir


  (Levemir)  10 units  Q12HR


 SUBQ


   9/18/20 10:00


 12/17/20 09:59  9/30/20 08:51





 


 Levothyroxine


 Sodium


  (Synthroid)  75 mcg  DAILY@0630


 GT


   9/30/20 06:30


 10/30/20 06:29  9/30/20 05:58





 


 Loperamide HCl


  (Imodium)  2 mg  Q6H  PRN


 NG


 Diarrhea  9/16/20 10:30


 10/16/20 10:29  9/23/20 11:41





 


 Pantoprazole


  (Protonix)  40 mg  EVERY 12  HOURS


 IVP


   9/28/20 21:00


 10/28/20 20:59  9/30/20 08:49





 


 Potassium Chloride


  (K-Dur)  40 meq  EVERY 12  HOURS


 GT


   9/26/20 21:00


 12/19/20 12:14  9/30/20 08:49




















Elfego Mcmahon MD            Sep 30, 2020 11:45

## 2020-09-30 NOTE — NUR
NURSE NOTES:

received report from star rn pt orally intubated -vent o2 sat 100% no acute resp distress 
noted chest tube -lilliam-vac cont suction with serous drainage tolerating tube no residual  
dressing dry and intact reposition and intact

## 2020-09-30 NOTE — DIAGNOSTIC IMAGING REPORT
EXAM:

  XR Chest, 1 View

 

CLINICAL HISTORY:

  S/P INTUB

 

TECHNIQUE:

  Frontal view of the chest.

 

COMPARISON:

  9/29/2020

 

IMPRESSION:     

 

ET tube terminates at the lisandro.  Recommend pullback 4-5 cm.

Right central line terminates in the proximal right atrium/cavoatrial 

junction.

Diffuse lung opacities are again seen.

 

<MYCVCSECTION>

 

Communications:

 

09/30/20 01:17 Call Nurse Called ICU LAYTON Fallon  on 09/30 01:17 (-07:00)

## 2020-09-30 NOTE — NUR
NURSE HAND-OFF REPORT: 



Latest Vital Signs: Temperature 98.0 , Pulse 54 , B/P 125 /76 , Respiratory Rate 25 , O2 SAT 
97 , Mechanical Ventilator, O2 Flow Rate 15.0 .  

Vital Sign Comment: 



EKG Rhythm: Sinus Bradycardia

Rhythm change?: N 

MD Notified?: Y Jean-Pierre Carver MD Response: No New Orders Received



Latest Momin Fall Score: 70  

Fall Risk: High Risk 

Safety Measures: Call light Within Reach, Bed Alarm Zone 1, Side Rails Side Rails x3, Bed 
position Low and Locked.

Fall Precautions: 

Yellow Socks

Door Sign

Patient Fall Education



Report given to luz rn using sbar.

## 2020-09-30 NOTE — PULMONOLGY CRITICAL CARE NOTE
Critical Care - Asmt/Plan


Problems:  


(1) Acute respiratory failure


(2) Pneumothorax


(3) Bacteremia


(4) Pneumonia


(5) Sepsis


(6) HCAP (healthcare-associated pneumonia)


(7) Seizure disorder


(8) Down's syndrome


(9) Trisomy 21, Down syndrome


Respiratory:  adjust tidal volume, monitor respiratory rate, adjust FIO2, CXR


Cardiac:  continue pressors, continue to monitor HR/BP


Renal:  F/U I&O, keep IV fluid, check electrolytes


Infectious Disease:  check cultures, continue antibiotics


Gastrointestinal:  continue feedings/current rate


Endocrine:  monitor blood sugar


Hematologic:  monitor H/H, transfuse if hgb<8.5


Neurologic:  PRN Ativan, keep patient comfortable


Affect:  PRN ativan


Prophylaxis:  Heparin


Time Spent (Minutes):  40


Notes Reviewed:  internist, cardio, renal


Discussed with:  nurses, consultants, 





Critical Care - Objective





Last 24 Hour Vital Signs








  Date Time  Temp Pulse Resp B/P (MAP) Pulse Ox O2 Delivery O2 Flow Rate FiO2


 


9/30/20 07:00  54 25 125/76 (92) 97   


 


9/30/20 06:00  55 25 125/73 (90) 96   


 


9/30/20 05:00  64 27 100/87 (91) 98   


 


9/30/20 04:00  60      


 


9/30/20 04:00      Mechanical Ventilator  





      Mechanical Ventilator  





      Mechanical Ventilator  


 


9/30/20 04:00        60


 


9/30/20 04:00 98.0 51 20 113/66 (82) 85   


 


9/30/20 03:14  69 24     60


 


9/30/20 03:00  46 18 124/59 (80)    


 


9/30/20 02:00  53 20 129/69 (89) 93   


 


9/30/20 01:00  72 23 137/74 (95) 93   


 


9/30/20 00:00  58      


 


9/30/20 00:00      Mechanical Ventilator  





      Mechanical Ventilator  





      Mechanical Ventilator  


 


9/30/20 00:00 98.6 56 30 108/71 (83) 95   


 


9/30/20 00:00        60


 


9/29/20 23:00  67 29 117/57 (77) 99   


 


9/29/20 22:55  52 24     60


 


9/29/20 22:00  70 23 133/49 (77) 98   


 


9/29/20 21:00  50 24 122/79 (93) 97   


 


9/29/20 20:00 98.5 68 25 126/51 (76) 97   


 


9/29/20 20:00  54      


 


9/29/20 20:00        60


 


9/29/20 20:00      Mechanical Ventilator  





      Mechanical Ventilator  





      Mechanical Ventilator  


 


9/29/20 19:30  54 24     60


 


9/29/20 19:00  61 20 99/51 (67) 99   


 


9/29/20 18:00  57 21 95/66 (76) 98   


 


9/29/20 17:00  66 28 102/57 (72) 96   


 


9/29/20 16:00      Mechanical Ventilator  





      Mechanical Ventilator  





      Mechanical Ventilator  


 


9/29/20 16:00        60


 


9/29/20 16:00 98.0 55 18 129/55 (79) 95   


 


9/29/20 15:25  51      


 


9/29/20 15:03  65 24     60


 


9/29/20 15:00  55 24 121/76 (91) 96   


 


9/29/20 14:00  53 20 99/55 (70) 98   


 


9/29/20 13:00  62 23 128/55 (79) 97   


 


9/29/20 12:00      Mechanical Ventilator  





      Mechanical Ventilator  





      Mechanical Ventilator  


 


9/29/20 12:00 98.3 59 25 102/59 (73) 97   


 


9/29/20 11:48  54      


 


9/29/20 11:17        60


 


9/29/20 11:16  59 24     70


 


9/29/20 11:00  55 24 104/59 (74) 99   


 


9/29/20 10:00  54 25 101/56 (71) 99   


 


9/29/20 09:00  54 22 93/58 (70) 98   








Status:  sedated


Condition:  critical


Neck:  full ROM


Lungs:  clear


Heart:  HR/BP stable


Abdomen:  soft


Extremities:  no C/C/E


Accucheck:  156





Critical Care - Subjective


ROS Limited/Unobtainable:  Yes


Condition:  critical


EKG Rhythm:  Sinus Rhythm


FI02:  60


Vent Support Breath Rate:  24


Vent Support Mode:  AC


Vent Tidal Volume:  500


Sputum Amount:  Moderate


PEEP:  0.0


PIP:  37


Tube Feeding Amount:  50


I&O:











Intake and Output  


 


 9/29/20 9/30/20





 19:00 07:00


 


Intake Total 800 ml 700 ml


 


Output Total 540 ml 540 ml


 


Balance 260 ml 160 ml


 


  


 


Free Water  200 ml


 


IV Total 100 ml 


 


Tube Feeding 600 ml 500 ml


 


Other 100 ml 


 


Output Urine Total 530 ml 540 ml


 


Chest Tube Drainage Total 10 ml 


 


# Bowel Movements 2 3








CXR:


extensive infiltrate


ET-Tube:  7.5


ET Position:  22


Labs:





Laboratory Tests








Test


 9/29/20


08:55 9/29/20


12:06 9/29/20


12:41 9/29/20


17:25


 


POC Whole Blood Glucose


 Pending  


 185 MG/DL


()  H 


 102 MG/DL


()


 


Arterial Blood pH


 


 


 7.508


(7.350-7.450) 





 


Arterial Blood Partial


Pressure CO2 


 


 37.2 mmHg


(35.0-45.0) 





 


Arterial Blood Partial


Pressure O2 


 


 89.8 mmHg


(75.0-100.0) 





 


Arterial Blood HCO3


 


 


 28.9 mmol/L


(22.0-26.0)  H 





 


Arterial Blood Oxygen


Saturation 


 


 96.6 %


() 





 


Arterial Blood Base Excess   5.5 (-2-2)  H 


 


Cole Test   Positive   


 


Test


 9/29/20


20:28 9/29/20


23:30 9/30/20


04:20 





 


POC Whole Blood Glucose Pending   Pending    


 


Sodium Level


 


 


 147 MMOL/L


(136-145)  H 





 


Potassium Level


 


 


 3.8 MMOL/L


(3.5-5.1) 





 


Chloride Level


 


 


 113 MMOL/L


()  H 





 


Carbon Dioxide Level


 


 


 28 MMOL/L


(21-32) 





 


Anion Gap


 


 


 6 mmol/L


(5-15) 





 


Blood Urea Nitrogen


 


 


 20 mg/dL


(7-18)  H 





 


Creatinine


 


 


 0.7 MG/DL


(0.55-1.30) 





 


Estimat Glomerular Filtration


Rate 


 


 > 60 mL/min


(>60) 





 


Glucose Level


 


 


 144 MG/DL


()  H 





 


Calcium Level


 


 


 7.6 MG/DL


(8.5-10.1)  L 





 


Phosphorus Level


 


 


 2.3 MG/DL


(2.5-4.9)  L 





 


Magnesium Level


 


 


 1.8 MG/DL


(1.8-2.4) 





 


Total Bilirubin


 


 


 0.7 MG/DL


(0.2-1.0) 





 


Aspartate Amino Transf


(AST/SGOT) 


 


 23 U/L (15-37)


 





 


Alanine Aminotransferase


(ALT/SGPT) 


 


 37 U/L (12-78)


 





 


Alkaline Phosphatase


 


 


 66 U/L


() 





 


Total Protein


 


 


 5.3 G/DL


(6.4-8.2)  L 





 


Albumin


 


 


 1.4 G/DL


(3.4-5.0)  L 





 


Globulin   3.9 g/dL   


 


Albumin/Globulin Ratio


 


 


 0.4 (1.0-2.7)


L 




















Chano Cherry MD              Sep 30, 2020 08:10

## 2020-09-30 NOTE — NUR
NURSE NOTES:



Dr. Jorgensen at bedside. Patient remains sinus bradycardia in cardiac monitor. Heart rate 
of 51.  No new order from Dr. Jorgensen at this time. Will continue to monitor.

## 2020-09-30 NOTE — NEPHROLOGY PROGRESS NOTE
Assessment/Plan


Problem List:  


(1) DAVID (acute kidney injury)


(2) Respiratory failure requiring intubation


(3) Down's syndrome


(4) Seizure disorder


(5) Hypothyroidism


Assessment





Acute renal failure, likely due to hypotension


Acute respiratory distress, hypoxia


Seizure disorder


Hypothyroidism


Down syndrome


Full code











Fluid challenge with IV fluids and albumin


Midodrine for BP above 100 systolic


Check TSH level


Check


Correct level


Monitor renal parameters


Urine studies


Per orders


Plan


September 30: Labs reviewed.  Electrolyte abnormalities addressed.  Heart rate 

remains bradycardic.  Continue per cardiology.  Continue to monitor renal 

parameters and electrolytes.


September 29: Labs reviewed.  Electrolyte abnormalities addressed and replaced. 

Medication list reviewed.  Heart rate remains mid 50s.  Continue as is.


September 28: On ventilator.  Full code.  Heart rate low.  Will discontinue 

Midodrin.  Continue to rest.  Electrolytes within normal limit.  Discussed with 

RN.


September 27: Remains intubated.  Full code.  No plan for tracheostomy yet.  

Labs reviewed.  All acceptable.  Continue same management


September 26: Labs reviewed.  Discussed with RN.  Potassium and phosphorus and 

magnesium replacement ordered.  Hemoglobin 9.5.  Patient remains full code.  

Continue per consultants.


September 25: Lab reviewed.  Renal parameters stable.  Full code.  Intubated.  

Electrolyte abnormalities addressed and replacement done.


September 24: Lab reviewed.  Renal parameters stable.  Full code.  Intubated.  

Has right side chest tube.  Continue per consultants.


September 23: Lab reviewed.  Renal parameters stable.  Full code.  Has right 

chest tube.  Planning process for tracheostomy.  Defer to chest and general 

surgeon.


September 22: Labs reviewed.  Renal parameters stable.  Remains full code.  

Remains on ventilator.  Due for tracheostomy tomorrow.  Continue per 

consultants.  Patient has a right chest tube in place at this time.


September 21: Labs reviewed.  Electrolyte imbalances addressed and supplemented.

 Remains full code and on ventilator.  Continue to monitor renal parameters.  

Continue per consultants.


September 20: Labs reviewed.  Serum potassium again low today.  Potassium 

supplement IV and through GT given.  Patient remains full code and is on 

ventilator.  Continue per consultants.  Continue to monitor electrolytes and 

renal parameters.


September 19: Labs reviewed.  Abnormal electrolyte addressed.  Remains full 

code.  Remains vented.


September 18: Day 27 of hospitalization.  Full code.  Labs reviewed.  Hemoglobin

down to 7.5.  Electrolyte abnormalities addressed and corrections ordered.  

Continue to monitor renal parameters.  Continue per consultants.  Start on 

Levemir for blood sugar management.  Questioning continuation of hydrocortisone?


September 17: Labs reviewed.  Potassium, phosphorus, hemoglobin, are all low.  

Potassium and phosphorus IV replacement given.  Continue to monitor electrolytes

and CBC.  Patient remains full code.


September 16: Lab reviewed.  Low phosphorus low magnesium and low potassium was 

addressed.  Hemoglobin drifting lower.  Continue per consultants.  Patient 

remains full code.


September 15: Labs reviewed.  Patient continues to be on ventilator.  D5W for 

high sodium and also potassium chloride intravenously as supplement given.  

Hemoglobin 8.4.  Continue to monitor electrolytes and renal parameters.


September 14: Labs reviewed.  Low potassium and high sodium noted.  Hemoglobin 

8.1 stable.  Aim to correct abnormal electrolyte.  Continue rest.  Will give 2 

boluses of D5W 500 cc.


September 13: Lab reviewed.  Abnormal electrolytes noted and addressed.


September 12: Labs reviewed.  Potassium supplement given.  Patient remains full 

code.  Continue per consultants.


September 11: Lab reviewed.  Electrolyte abnormalities addressed.  Continue per 

pulmonary and ID.


September 10: Lab reviewed.  Status unchanged.  Serum sodium 151 unchanged.  

Stable from renal standpoint of view.


September 9: Labs reviewed.  Status quo.  D5W 500 cc IV ordered.  Continue to 

monitor renal parameters.


September 8: Status quo.  Labs reviewed.  Overall condition unchanged.  Patient 

was transfused and hemoglobin higher.  Continue current management.  Patient 

remains full code.


September 7: Status quo.  Overall condition poor.  Very low albumin.  Edematous.

 Hypotensive.  Hemoglobin lower.  Anemia work-up ordered.  I favor transfusion 2

units of packed RBCs.  Patient remains full code.  I favor supportive care only.

 Will discuss.


September 6: Electrolyte abnormalities addressed.  Serum creatinine lower.  

Continue per current management.


September 5: Status unchanged.  Lab reviewed.  Serum potassium 2.7.  IV 

potassium chloride ordered.  Serum creatinine low at 1.6 stable.  Blood pressure

90s systolic


September 4: Status quo.  Labs reviewed.  Renal parameters stable.  Serum 

creatinine down to 1.6.  Medication list reviewed.  Continues to be on 

midodrine.  Continue per consultants.


September 3: Status quo.  Labs reviewed.  Electrolytes adjusted.  Serum 

creatinine down to 1.8.  Continue per consultants.


September 2: Status quo.  Labs reviewed.  Phosphorus supplement IV given.  Serum

creatinine 2.  Continue per consultants.


September 1: Requires less pressors.  Albumin bolus given.  1 dose of Lasix IV 

ordered as the patient severely edematous.  Patient serum albumin is very low.  

Continue per consultants.


August 31: Continues to be intubated.  Labs reviewed.  Serum creatinine 1.9 

unchanged.  Blood pressure more stable.  Off 1 of the pressors.  Continue to 

monitor renal parameters.  Continue per consultants.  Patient now on 

hydrocortisone 100 mg every 8 hours.  Will decrease IV fluid.  Normal saline 

down to 50 cc an hour.


August 30: Intubated.  Labs reviewed.  Creatinine 1.9 unchanged.  Continue same 

treatment plan.  Per consultants.  Overall poor prognosis since the patient 

remains on pressors and her pulmonary status is worsening.


August 29: Remains intubated.  Labs reviewed.  Creatinine 1.9.  Blood pressure 

systolic 90s.  Continue per consultants.


August 28: Remains intubated.  Labs reviewed.  Serum creatinine lower to 2.  

Vancomycin level lower.  Remains hypotensive on pressors.  Will increase 

midodrine to 10 mg every 8 hours.  Continue per consultants.  Continue to 

monitor renal parameters.


August 27: Patient now in ICU.  Intubated.  On pressors.  Labs reviewed.  Will 

increase midodrine.  Aim to keep blood pressure over 100 systolic.  Will give 

albumin bolus.  Will check vancomycin level which was elevated when checked 

previously on August 24.  Will monitor renal parameters.  Continue per 

consultants.





Subjective


ROS Limited/Unobtainable:  Yes





Objective


Objective





Last 24 Hour Vital Signs








  Date Time  Temp Pulse Resp B/P (MAP) Pulse Ox O2 Delivery O2 Flow Rate FiO2


 


9/30/20 16:00  52 22 104/88 (93) 94   


 


9/30/20 16:00      Mechanical Ventilator  





      Mechanical Ventilator  





      Mechanical Ventilator  


 


9/30/20 16:00        60


 


9/30/20 15:41  52      


 


9/30/20 15:00  43 24 129/57 (81) 95   


 


9/30/20 14:00  43 25 118/59 (78) 94   


 


9/30/20 13:00  48 24 130/60 (83) 95   


 


9/30/20 12:00        60


 


9/30/20 12:00  46 24 152/68 (96) 96   


 


9/30/20 12:00      Mechanical Ventilator  





      Mechanical Ventilator  





      Mechanical Ventilator  


 


9/30/20 11:35  61      


 


9/30/20 11:06  61 24     60


 


9/30/20 11:00  52 21 142/93 (109) 94   


 


9/30/20 10:00  49 26 140/75 (96) 95   


 


9/30/20 09:00  63 24 117/69 (85) 96   


 


9/30/20 08:00        60


 


9/30/20 08:00 98.3 53 27 141/72 (95) 96   


 


9/30/20 08:00      Mechanical Ventilator  





      Mechanical Ventilator  





      Mechanical Ventilator  


 


9/30/20 07:41  59      


 


9/30/20 07:11  45 24     60


 


9/30/20 07:00  54 25 125/76 (92) 97   


 


9/30/20 06:00  55 25 125/73 (90) 96   


 


9/30/20 05:00  64 27 100/87 (91) 98   


 


9/30/20 04:00  60      


 


9/30/20 04:00      Mechanical Ventilator  





      Mechanical Ventilator  





      Mechanical Ventilator  


 


9/30/20 04:00        60


 


9/30/20 04:00 98.0 51 20 113/66 (82) 85   


 


9/30/20 03:14  69 24     60


 


9/30/20 03:00  46 18 124/59 (80)    


 


9/30/20 02:00  53 20 129/69 (89) 93   


 


9/30/20 01:00  72 23 137/74 (95) 93   


 


9/30/20 00:00  58      


 


9/30/20 00:00      Mechanical Ventilator  





      Mechanical Ventilator  





      Mechanical Ventilator  


 


9/30/20 00:00 98.6 56 30 108/71 (83) 95   


 


9/30/20 00:00        60


 


9/29/20 23:00  67 29 117/57 (77) 99   


 


9/29/20 22:55  52 24     60


 


9/29/20 22:00  70 23 133/49 (77) 98   


 


9/29/20 21:00  50 24 122/79 (93) 97   


 


9/29/20 20:00 98.5 68 25 126/51 (76) 97   


 


9/29/20 20:00  54      


 


9/29/20 20:00        60


 


9/29/20 20:00      Mechanical Ventilator  





      Mechanical Ventilator  





      Mechanical Ventilator  


 


9/29/20 19:30  54 24     60


 


9/29/20 19:00  61 20 99/51 (67) 99   


 


9/29/20 18:00  57 21 95/66 (76) 98   


 


9/29/20 17:00  66 28 102/57 (72) 96   

















Intake and Output  


 


 9/29/20 9/30/20





 19:00 07:00


 


Intake Total 800 ml 700 ml


 


Output Total 540 ml 540 ml


 


Balance 260 ml 160 ml


 


  


 


Free Water  200 ml


 


IV Total 100 ml 


 


Tube Feeding 600 ml 500 ml


 


Other 100 ml 


 


Output Urine Total 530 ml 540 ml


 


Chest Tube Drainage Total 10 ml 


 


# Bowel Movements 2 3








Laboratory Tests


9/29/20 17:25: POC Whole Blood Glucose 102


9/29/20 20:28: POC Whole Blood Glucose [Pending]


9/29/20 23:30: POC Whole Blood Glucose [Pending]


9/30/20 04:20: 


Sodium Level 147H, Potassium Level 3.8, Chloride Level 113H, Carbon Dioxide 

Level 28, Anion Gap 6, Blood Urea Nitrogen 20H, Creatinine 0.7, Estimat Glomerul

ar Filtration Rate > 60, Glucose Level 144H, Calcium Level 7.6L, Phosphorus 

Level 2.3L, Magnesium Level 1.8, Total Bilirubin 0.7, Aspartate Amino Transf 

(AST/SGOT) 23, Alanine Aminotransferase (ALT/SGPT) 37, Alkaline Phosphatase 66, 

Total Protein 5.3L, Albumin 1.4L, Globulin 3.9, Albumin/Globulin Ratio 0.4L


9/30/20 05:51: POC Whole Blood Glucose 157H


9/30/20 08:41: POC Whole Blood Glucose [Pending]


9/30/20 13:10: POC Whole Blood Glucose 133H


Height (Feet):  5


Height (Inches):  3.00


Weight (Pounds):  172


General Appearance:  no apparent distress


EENT:  other - Intubated on ventilator


Cardiovascular:  bradycardia


Respiratory/Chest:  decreased breath sounds


Abdomen:  distended











Lam Nino MD            Sep 30, 2020 16:37

## 2020-09-30 NOTE — GENERAL PROGRESS NOTE
Subjective


ROS Limited/Unobtainable:  No


Allergies:  


Coded Allergies:  


     No Known Allergies (Unverified , 1/8/19)





Objective





Last 24 Hour Vital Signs








  Date Time  Temp Pulse Resp B/P (MAP) Pulse Ox O2 Delivery O2 Flow Rate FiO2


 


9/30/20 07:11  45 24     60


 


9/30/20 07:00  54 25 125/76 (92) 97   


 


9/30/20 06:00  55 25 125/73 (90) 96   


 


9/30/20 05:00  64 27 100/87 (91) 98   


 


9/30/20 04:00  60      


 


9/30/20 04:00      Mechanical Ventilator  





      Mechanical Ventilator  





      Mechanical Ventilator  


 


9/30/20 04:00        60


 


9/30/20 04:00 98.0 51 20 113/66 (82) 85   


 


9/30/20 03:14  69 24     60


 


9/30/20 03:00  46 18 124/59 (80)    


 


9/30/20 02:00  53 20 129/69 (89) 93   


 


9/30/20 01:00  72 23 137/74 (95) 93   


 


9/30/20 00:00  58      


 


9/30/20 00:00      Mechanical Ventilator  





      Mechanical Ventilator  





      Mechanical Ventilator  


 


9/30/20 00:00 98.6 56 30 108/71 (83) 95   


 


9/30/20 00:00        60


 


9/29/20 23:00  67 29 117/57 (77) 99   


 


9/29/20 22:55  52 24     60


 


9/29/20 22:00  70 23 133/49 (77) 98   


 


9/29/20 21:00  50 24 122/79 (93) 97   


 


9/29/20 20:00 98.5 68 25 126/51 (76) 97   


 


9/29/20 20:00  54      


 


9/29/20 20:00        60


 


9/29/20 20:00      Mechanical Ventilator  





      Mechanical Ventilator  





      Mechanical Ventilator  


 


9/29/20 19:30  54 24     60


 


9/29/20 19:00  61 20 99/51 (67) 99   


 


9/29/20 18:00  57 21 95/66 (76) 98   


 


9/29/20 17:00  66 28 102/57 (72) 96   


 


9/29/20 16:00      Mechanical Ventilator  





      Mechanical Ventilator  





      Mechanical Ventilator  


 


9/29/20 16:00        60


 


9/29/20 16:00 98.0 55 18 129/55 (79) 95   


 


9/29/20 15:25  51      


 


9/29/20 15:03  65 24     60


 


9/29/20 15:00  55 24 121/76 (91) 96   


 


9/29/20 14:00  53 20 99/55 (70) 98   


 


9/29/20 13:00  62 23 128/55 (79) 97   


 


9/29/20 12:00      Mechanical Ventilator  





      Mechanical Ventilator  





      Mechanical Ventilator  


 


9/29/20 12:00 98.3 59 25 102/59 (73) 97   


 


9/29/20 11:48  54      


 


9/29/20 11:17        60


 


9/29/20 11:16  59 24     70


 


9/29/20 11:00  55 24 104/59 (74) 99   


 


9/29/20 10:00  54 25 101/56 (71) 99   


 


9/29/20 09:00  54 22 93/58 (70) 98   

















Intake and Output 0 


 


 9/29/20 9/30/20





 18:59 06:59


 


Intake Total 800 ml 750 ml


 


Output Total 535 ml 550 ml


 


Balance 265 ml 200 ml


 


  


 


Free Water  200 ml


 


IV Total 100 ml 


 


Tube Feeding 600 ml 550 ml


 


Other 100 ml 


 


Output Urine Total 535 ml 540 ml


 


Chest Tube Drainage Total  10 ml


 


# Bowel Movements 1 4








Laboratory Tests


9/29/20 08:55: POC Whole Blood Glucose [Pending]


9/29/20 12:06: POC Whole Blood Glucose 185H


9/29/20 12:41: 


Arterial Blood pH 7.508H, Arterial Blood Partial Pressure CO2 37.2, Arterial 

Blood Partial Pressure O2 89.8, Arterial Blood HCO3 28.9H, Arterial Blood Oxygen

 Saturation 96.6, Arterial Blood Base Excess 5.5H, Cole Test Positive


9/29/20 17:25: POC Whole Blood Glucose 102


9/29/20 20:28: POC Whole Blood Glucose [Pending]


9/29/20 23:30: POC Whole Blood Glucose [Pending]


9/30/20 04:20: 


Sodium Level 147H, Potassium Level 3.8, Chloride Level 113H, Carbon Dioxide 

Level 28, Anion Gap 6, Blood Urea Nitrogen 20H, Creatinine 0.7, Estimat 

Glomerular Filtration Rate > 60, Glucose Level 144H, Calcium Level 7.6L, 

Phosphorus Level 2.3L, Magnesium Level 1.8, Total Bilirubin 0.7, Aspartate Amino

 Transf (AST/SGOT) 23, Alanine Aminotransferase (ALT/SGPT) 37, Alkaline 

Phosphatase 66, Total Protein 5.3L, Albumin 1.4L, Globulin 3.9, Albumin/Globulin

 Ratio 0.4L


Height (Feet):  5


Height (Inches):  3.00


Weight (Pounds):  172


General Appearance:  lethargic


EENT:  normal ENT inspection


Neck:  supple


Cardiovascular:  normal rate


Respiratory/Chest:  decreased breath sounds


Abdomen:  hypoactive bowel sounds


Extremities:  non-tender





Assessment/Plan


Status:  stable, not improved, unchanged


Assessment/Plan:


1. History of Down syndrome.


2. Dysphagia with G-tube.


3. Seizure disorder.


4. Hypothyroidism.


5. DAVID.


6. Pneumonia.


7. Sepsis.








fu H&H


prn blood transfusion to keep HGB above 7


ppi


GTF


hold GI procedures for now


stool ob + 1/2











Ted Nagy MD             Sep 30, 2020 08:54

## 2020-09-30 NOTE — NUR
NOTE





AUDREY received a call from Annetta from [facility] clinical department [phone]. Per Annetta, the 
department will review the clinicals and bioethics recommendation to address the code 
status. 



AUDREY will fax clinicals to [phone]. 

 

AUDREY notified Dr. Diaz of bioethics consult. AUDREY will continue to F/U.

## 2020-09-30 NOTE — NUR
NURSE NOTES:

Received patient from Snehla CHANEL RN. Orally intubated, ET size of 7.5, 24 cm in the lip, 
mechanical ventilator dependent. Ventilator settings of AC 24, Tidal Volume 500, FiO2 of 
60%, no PEEP. GT feeding on hold for synthroid. Patient asleep. Remains Sinus Jose Daniel in the 
monitor, heart rate of 53. Left upper arm PICC noted. Valle cath inplace with yellow clear 
urine output noted in drainage bag by gravity. Right lateral chest tube remains in place, 
connected to low continuous suction, with serous drainage noted in the pleurovac.  Contact 
isolation observed. Will continue plan of care.

## 2020-10-01 VITALS — SYSTOLIC BLOOD PRESSURE: 112 MMHG | DIASTOLIC BLOOD PRESSURE: 53 MMHG

## 2020-10-01 VITALS — DIASTOLIC BLOOD PRESSURE: 104 MMHG | SYSTOLIC BLOOD PRESSURE: 126 MMHG

## 2020-10-01 VITALS — SYSTOLIC BLOOD PRESSURE: 102 MMHG | DIASTOLIC BLOOD PRESSURE: 59 MMHG

## 2020-10-01 VITALS — SYSTOLIC BLOOD PRESSURE: 114 MMHG | DIASTOLIC BLOOD PRESSURE: 50 MMHG

## 2020-10-01 VITALS — SYSTOLIC BLOOD PRESSURE: 115 MMHG | DIASTOLIC BLOOD PRESSURE: 67 MMHG

## 2020-10-01 VITALS — DIASTOLIC BLOOD PRESSURE: 54 MMHG | SYSTOLIC BLOOD PRESSURE: 100 MMHG

## 2020-10-01 VITALS — SYSTOLIC BLOOD PRESSURE: 120 MMHG | DIASTOLIC BLOOD PRESSURE: 68 MMHG

## 2020-10-01 VITALS — DIASTOLIC BLOOD PRESSURE: 68 MMHG | SYSTOLIC BLOOD PRESSURE: 134 MMHG

## 2020-10-01 VITALS — SYSTOLIC BLOOD PRESSURE: 114 MMHG | DIASTOLIC BLOOD PRESSURE: 51 MMHG

## 2020-10-01 VITALS — SYSTOLIC BLOOD PRESSURE: 132 MMHG | DIASTOLIC BLOOD PRESSURE: 65 MMHG

## 2020-10-01 VITALS — DIASTOLIC BLOOD PRESSURE: 54 MMHG | SYSTOLIC BLOOD PRESSURE: 128 MMHG

## 2020-10-01 VITALS — SYSTOLIC BLOOD PRESSURE: 122 MMHG | DIASTOLIC BLOOD PRESSURE: 74 MMHG

## 2020-10-01 VITALS — DIASTOLIC BLOOD PRESSURE: 80 MMHG | SYSTOLIC BLOOD PRESSURE: 133 MMHG

## 2020-10-01 VITALS — DIASTOLIC BLOOD PRESSURE: 57 MMHG | SYSTOLIC BLOOD PRESSURE: 92 MMHG

## 2020-10-01 VITALS — DIASTOLIC BLOOD PRESSURE: 78 MMHG | SYSTOLIC BLOOD PRESSURE: 136 MMHG

## 2020-10-01 VITALS — DIASTOLIC BLOOD PRESSURE: 58 MMHG | SYSTOLIC BLOOD PRESSURE: 118 MMHG

## 2020-10-01 VITALS — SYSTOLIC BLOOD PRESSURE: 127 MMHG | DIASTOLIC BLOOD PRESSURE: 98 MMHG

## 2020-10-01 VITALS — SYSTOLIC BLOOD PRESSURE: 112 MMHG | DIASTOLIC BLOOD PRESSURE: 71 MMHG

## 2020-10-01 VITALS — DIASTOLIC BLOOD PRESSURE: 56 MMHG | SYSTOLIC BLOOD PRESSURE: 99 MMHG

## 2020-10-01 VITALS — SYSTOLIC BLOOD PRESSURE: 122 MMHG | DIASTOLIC BLOOD PRESSURE: 72 MMHG

## 2020-10-01 VITALS — DIASTOLIC BLOOD PRESSURE: 69 MMHG | SYSTOLIC BLOOD PRESSURE: 118 MMHG

## 2020-10-01 VITALS — DIASTOLIC BLOOD PRESSURE: 54 MMHG | SYSTOLIC BLOOD PRESSURE: 123 MMHG

## 2020-10-01 VITALS — DIASTOLIC BLOOD PRESSURE: 57 MMHG | SYSTOLIC BLOOD PRESSURE: 110 MMHG

## 2020-10-01 VITALS — DIASTOLIC BLOOD PRESSURE: 72 MMHG | SYSTOLIC BLOOD PRESSURE: 118 MMHG

## 2020-10-01 LAB
ADD MANUAL DIFF: NO
ANION GAP SERPL CALC-SCNC: 5 MMOL/L (ref 5–15)
BASOPHILS NFR BLD AUTO: 0.3 % (ref 0–2)
BUN SERPL-MCNC: 20 MG/DL (ref 7–18)
CALCIUM SERPL-MCNC: 7.2 MG/DL (ref 8.5–10.1)
CHLORIDE SERPL-SCNC: 112 MMOL/L (ref 98–107)
CO2 SERPL-SCNC: 28 MMOL/L (ref 21–32)
CREAT SERPL-MCNC: 0.6 MG/DL (ref 0.55–1.3)
EOSINOPHIL NFR BLD AUTO: 0.1 % (ref 0–3)
ERYTHROCYTE [DISTWIDTH] IN BLOOD BY AUTOMATED COUNT: 16.4 % (ref 11.6–14.8)
HCT VFR BLD CALC: 25.7 % (ref 37–47)
HGB BLD-MCNC: 8.6 G/DL (ref 12–16)
LYMPHOCYTES NFR BLD AUTO: 17.9 % (ref 20–45)
MCV RBC AUTO: 93 FL (ref 80–99)
MONOCYTES NFR BLD AUTO: 5.1 % (ref 1–10)
NEUTROPHILS NFR BLD AUTO: 76.6 % (ref 45–75)
PLATELET # BLD: 125 K/UL (ref 150–450)
POTASSIUM SERPL-SCNC: 3.2 MMOL/L (ref 3.5–5.1)
RBC # BLD AUTO: 2.77 M/UL (ref 4.2–5.4)
SODIUM SERPL-SCNC: 145 MMOL/L (ref 136–145)
WBC # BLD AUTO: 6.5 K/UL (ref 4.8–10.8)

## 2020-10-01 RX ADMIN — INSULIN DETEMIR SCH UNITS: 100 INJECTION, SOLUTION SUBCUTANEOUS at 20:13

## 2020-10-01 RX ADMIN — INSULIN ASPART SCH UNITS: 100 INJECTION, SOLUTION INTRAVENOUS; SUBCUTANEOUS at 05:59

## 2020-10-01 RX ADMIN — INSULIN ASPART SCH UNITS: 100 INJECTION, SOLUTION INTRAVENOUS; SUBCUTANEOUS at 23:38

## 2020-10-01 RX ADMIN — ACETAMINOPHEN PRN MG: 160 SOLUTION ORAL at 14:56

## 2020-10-01 RX ADMIN — INSULIN ASPART SCH UNITS: 100 INJECTION, SOLUTION INTRAVENOUS; SUBCUTANEOUS at 18:30

## 2020-10-01 RX ADMIN — INSULIN ASPART SCH UNITS: 100 INJECTION, SOLUTION INTRAVENOUS; SUBCUTANEOUS at 12:09

## 2020-10-01 RX ADMIN — HYDROCORTISONE SODIUM SUCCINATE SCH MG: 100 INJECTION, POWDER, FOR SOLUTION INTRAMUSCULAR; INTRAVENOUS at 05:57

## 2020-10-01 RX ADMIN — PANTOPRAZOLE SODIUM SCH MG: 40 INJECTION, POWDER, FOR SOLUTION INTRAVENOUS at 08:59

## 2020-10-01 RX ADMIN — HYDROCORTISONE SODIUM SUCCINATE SCH MG: 100 INJECTION, POWDER, FOR SOLUTION INTRAMUSCULAR; INTRAVENOUS at 14:56

## 2020-10-01 RX ADMIN — PANTOPRAZOLE SODIUM SCH MG: 40 INJECTION, POWDER, FOR SOLUTION INTRAVENOUS at 20:11

## 2020-10-01 RX ADMIN — HYDROCORTISONE SODIUM SUCCINATE SCH MG: 100 INJECTION, POWDER, FOR SOLUTION INTRAMUSCULAR; INTRAVENOUS at 21:15

## 2020-10-01 RX ADMIN — INSULIN DETEMIR SCH UNITS: 100 INJECTION, SOLUTION SUBCUTANEOUS at 10:00

## 2020-10-01 RX ADMIN — CHLORHEXIDINE GLUCONATE SCH APPLIC: 213 SOLUTION TOPICAL at 20:10

## 2020-10-01 NOTE — NUR
NOTE



The case was discussed w/ the bioethics committee. The bioethics committee recommends pt 
remaining full code. Please refer Dr. Diaz's progress note. 



AUDREY faxed requested clinicals and bioethics note to Annetta Olivaresmirta [phone]. AUDREY will 
continue to F/U. 




-------------------------------------------------------------------------------

Addendum: 10/01/20 at 1519 by HILL VENTURA

-------------------------------------------------------------------------------

AUDREY confirmed w/ Annetta that she received the packet.

## 2020-10-01 NOTE — NUR
NURSE NOTES:

pt turned, repositioned and cleaned. pts vital signs stable. pts blood sugar 161, Levemir 
given. pt remains a-symptomatic.

## 2020-10-01 NOTE — NUR
NURSE NOTES

pt report received from Galo SPARKS RN. pt remains stable resting in bed. pt is alert and 
oriented times 1, able to respond to name. pt is on cardiac monitor showing NSR, no acute 
cardiac distress noted. pt is trach vented sating 98% O2. no acute resp distress noted. pt 
bed is low, locked, armed, call light within reach, bed rails up times 3. will follow plan 
of care.

## 2020-10-01 NOTE — INTERNAL MED PROGRESS NOTE
Subjective


Date of Service:  Oct 1, 2020


Physician Name


Sanya Schultz


Attending Physician


Chano Cherry MD





Current Medications








 Medications


  (Trade)  Dose


 Ordered  Sig/Didi


 Route


 PRN Reason  Start Time


 Stop Time Status Last Admin


Dose Admin


 


 Acetaminophen


  (Tylenol)  650 mg  Q4H  PRN


 GT


 For Pain  9/23/20 17:15


 10/23/20 17:14  10/1/20 14:56





 


 Chlorhexidine


 Gluconate


  (Beatrice-Hex 2%)  1 applic  DAILY@2000


 TOPIC


   8/31/20 20:00


 11/29/20 19:59  9/30/20 20:55





 


 Clotrimazole


  (Lotrimin)  1 applic  Q12HR


 TOPIC


   8/30/20 13:00


 11/28/20 12:59  10/1/20 09:01





 


 Dextrose


  (Dextrose 50%)  25 ml  Q30M  PRN


 IV


 Hypoglycemia  8/26/20 11:30


 11/20/20 11:29   





 


 Dextrose


  (Dextrose 50%)  50 ml  Q30M  PRN


 IV


 Hypoglycemia  8/26/20 11:30


 11/20/20 11:29   





 


 Hydrocortisone


  (Solu-CORTEF)  50 mg  EVERY 8  HOURS


 IV


   9/28/20 06:00


 12/27/20 05:59  10/1/20 14:56





 


 Insulin Aspart


  (NovoLOG)    Q6HR


 SUBQ


   9/18/20 12:00


 12/17/20 11:59  10/1/20 12:09





 


 Insulin Detemir


  (Levemir)  10 units  Q12HR


 SUBQ


   9/18/20 10:00


 12/17/20 09:59  10/1/20 10:00





 


 Levothyroxine


 Sodium


  (Synthroid)  75 mcg  DAILY@0630


 GT


   9/30/20 06:30


 10/30/20 06:29  10/1/20 05:58





 


 Loperamide HCl


  (Imodium)  2 mg  Q6H  PRN


 NG


 Diarrhea  9/16/20 10:30


 10/16/20 10:29  9/23/20 11:41





 


 Pantoprazole


  (Protonix)  40 mg  EVERY 12  HOURS


 IVP


   9/28/20 21:00


 10/28/20 20:59  10/1/20 08:59





 


 Potassium Chloride


  (K-Dur)  40 meq  EVERY 12  HOURS


 GT


   9/26/20 21:00


 12/19/20 12:14  10/1/20 09:00











Allergies:  


Coded Allergies:  


     No Known Allergies (Unverified , 1/8/19)


ROS Limited/Unobtainable:  Yes


Subjective


60 YO F with Down's syndrome admitted with hypoxia.  Now sepsis and pneumonia.  

Cover for Int Phi-DR Hung.  ICU.  Intubated and sedated





Objective





Last Vital Signs








  Date Time  Temp Pulse Resp B/P (MAP) Pulse Ox O2 Delivery O2 Flow Rate FiO2


 


10/1/20 17:00  46 27 112/71 (85) 95   


 


10/1/20 15:23        60


 


10/1/20 13:00 98.9       


 


10/1/20 12:00      Mechanical Ventilator  





      Mechanical Ventilator  





      Mechanical Ventilator  











Laboratory Tests








Test


 9/30/20


18:14 9/30/20


20:53 9/30/20


23:49 10/1/20


03:50


 


POC Whole Blood Glucose


 92 MG/DL


() Pending  


 153 MG/DL


()  H 





 


White Blood Count


 


 


 


 6.5 K/UL


(4.8-10.8)


 


Red Blood Count


 


 


 


 2.77 M/UL


(4.20-5.40)  L


 


Hemoglobin


 


 


 


 8.6 G/DL


(12.0-16.0)  L


 


Hematocrit


 


 


 


 25.7 %


(37.0-47.0)  L


 


Mean Corpuscular Volume    93 FL (80-99)  


 


Mean Corpuscular Hemoglobin


 


 


 


 31.1 PG


(27.0-31.0)  H


 


Mean Corpuscular Hemoglobin


Concent 


 


 


 33.4 G/DL


(32.0-36.0)


 


Red Cell Distribution Width


 


 


 


 16.4 %


(11.6-14.8)  H


 


Platelet Count


 


 


 


 125 K/UL


(150-450)  L


 


Mean Platelet Volume


 


 


 


 8.5 FL


(6.5-10.1)


 


Neutrophils (%) (Auto)


 


 


 


 76.6 %


(45.0-75.0)  H


 


Lymphocytes (%) (Auto)


 


 


 


 17.9 %


(20.0-45.0)  L


 


Monocytes (%) (Auto)


 


 


 


 5.1 %


(1.0-10.0)


 


Eosinophils (%) (Auto)


 


 


 


 0.1 %


(0.0-3.0)


 


Basophils (%) (Auto)


 


 


 


 0.3 %


(0.0-2.0)


 


Sodium Level


 


 


 


 145 MMOL/L


(136-145)


 


Potassium Level


 


 


 


 3.2 MMOL/L


(3.5-5.1)  L


 


Chloride Level


 


 


 


 112 MMOL/L


()  H


 


Carbon Dioxide Level


 


 


 


 28 MMOL/L


(21-32)


 


Anion Gap


 


 


 


 5 mmol/L


(5-15)


 


Blood Urea Nitrogen


 


 


 


 20 mg/dL


(7-18)  H


 


Creatinine


 


 


 


 0.6 MG/DL


(0.55-1.30)


 


Estimat Glomerular Filtration


Rate 


 


 


 > 60 mL/min


(>60)


 


Glucose Level


 


 


 


 169 MG/DL


()  H


 


Calcium Level


 


 


 


 7.2 MG/DL


(8.5-10.1)  L

















Intake and Output  


 


 9/30/20 10/1/20





 19:00 07:00


 


Intake Total 650 ml 800 ml


 


Output Total 530 ml 610 ml


 


Balance 120 ml 190 ml


 


  


 


Free Water  200 ml


 


Tube Feeding 550 ml 600 ml


 


Other 100 ml 


 


Output Urine Total 500 ml 585 ml


 


Chest Tube Drainage Total 30 ml 25 ml


 


# Bowel Movements  2








Objective


General Appearance:  WD/WN, no apparent distress, alert


EENT:  PERRL/EOMI, normal ENT inspection


Neck:  non-tender, normal alignment, supple, normal inspection


Cardiovascular:  normal peripheral pulses, normal rate, regular rhythm, no 

gallop/murmur, no JVD


Respiratory/Chest:  Mech vent; decreased breath sounds, crackles/rales, rhonchi 

- bilaterally, expiratory wheezing


Abdomen:  normal bowel sounds, non tender, soft, no organomegaly, no mass


Extremities:  normal range of motion


Neurologic:  CNs II-XII grossly normal


Skin:  normal pigmentation, warm/dry





Assessment/Plan


Problem List:  


(1) HCAP (healthcare-associated pneumonia)


Assessment & Plan:  Strep Group G.  S/P amikacin per ID=Dr Gomes.  

Pulmonary/Critical care=DR Cherry.  COVID NEG





(2) Sepsis


Assessment & Plan:  Staph haemolyticus.  S/P amikacin per ID=Dr Gomes





(3) Down's syndrome


(4) Dysphagia


Assessment & Plan:  S/P PEG





(5) Seizure disorder


Assessment & Plan:  Continue keppra and depakote





(6) Hypothyroidism


Assessment & Plan:  Continue synthroid





(7) Acute respiratory failure


Assessment & Plan:  Pulmonary = Dr Cherry; mech vent





(8) Pneumothorax, right


Assessment & Plan:  Continue chest tube per pulmonary





(9) Cardiac arrest


Assessment & Plan:  8/26/20-see cardiology note=Sanya Dunn MD                Oct 1, 2020 17:43

## 2020-10-01 NOTE — SURGERY PROGRESS NOTE
Surgery Progress Note


Subjective


Additional Comments


pending consent for trach


no n/v





Objective





Last 24 Hour Vital Signs








  Date Time  Temp Pulse Resp B/P (MAP) Pulse Ox O2 Delivery O2 Flow Rate FiO2


 


10/1/20 11:00  57 22 122/72 (89) 96   


 


10/1/20 10:00  47 26 118/58 (78) 95   


 


10/1/20 09:00  59 21 114/51 (72) 96   


 


10/1/20 08:00  74 30 132/65 (87) 99   


 


10/1/20 08:00      Mechanical Ventilator  





      Mechanical Ventilator  





      Mechanical Ventilator  


 


10/1/20 07:06  61 24     60


 


10/1/20 07:00 98.0 56 23 120/68 (85) 94   


 


10/1/20 06:00  59 25 123/54 (77) 96   


 


10/1/20 05:00  45 25 100/54 (69) 96   


 


10/1/20 04:00 98.8 48 24 99/56 (70) 96   


 


10/1/20 04:00        60


 


10/1/20 04:00      Mechanical Ventilator  





      Mechanical Ventilator  





      Mechanical Ventilator  


 


10/1/20 04:00  48      


 


10/1/20 03:30  61 24     60


 


10/1/20 03:00  66 23 118/72 (87) 97   


 


10/1/20 02:00  60 21 136/78 (97) 96   


 


10/1/20 01:00  63 16 133/80 (97) 94   


 


10/1/20 00:00      Mechanical Ventilator  





      Mechanical Ventilator  





      Mechanical Ventilator  


 


10/1/20 00:00        60


 


10/1/20 00:00  50      


 


10/1/20 00:00  63 19 122/74 (90) 95   


 


9/30/20 23:00  62 19 109/70 (83) 95   


 


9/30/20 22:49  62 24     60


 


9/30/20 22:00  46 28 114/77 (89) 94   


 


9/30/20 21:00  56 23 125/61 (82) 94   


 


9/30/20 20:00        60


 


9/30/20 20:00 98.5 58 25 132/76 (94) 93   


 


9/30/20 20:00      Mechanical Ventilator  





      Mechanical Ventilator  





      Mechanical Ventilator  


 


9/30/20 20:00  48      


 


9/30/20 19:15  43 24     60


 


9/30/20 19:00  52 23 137/73 (94) 94   


 


9/30/20 18:00  58 27 135/75 (95) 92   


 


9/30/20 17:00  68 32 125/83 (97) 93   


 


9/30/20 16:00 98.9 52 22 104/88 (93) 94   


 


9/30/20 16:00      Mechanical Ventilator  





      Mechanical Ventilator  





      Mechanical Ventilator  


 


9/30/20 16:00        60


 


9/30/20 15:41  52      


 


9/30/20 15:15  54 24     60


 


9/30/20 15:00  43 24 129/57 (81) 95   


 


9/30/20 14:00  43 25 118/59 (78) 94   


 


9/30/20 13:00  48 24 130/60 (83) 95   








I&O











Intake and Output  


 


 9/30/20 10/1/20





 18:59 06:59


 


Intake Total 600 ml 800 ml


 


Output Total 495 ml 640 ml


 


Balance 105 ml 160 ml


 


  


 


Free Water  200 ml


 


Tube Feeding 500 ml 600 ml


 


Other 100 ml 


 


Output Urine Total 495 ml 585 ml


 


Chest Tube Drainage Total  55 ml


 


# Bowel Movements  2








Dressing:  other


Wound:  other


Cardiovascular:  RSR


Respiratory:  decreased breath sounds


Abdomen:  soft, non-tender, present bowel sounds


Extremities:  edema, no tenderness, no cyanosis





Laboratory Tests








Test


 9/30/20


13:10 9/30/20


18:14 9/30/20


20:53 9/30/20


23:49


 


POC Whole Blood Glucose


 133 MG/DL


()  H 92 MG/DL


() Pending  


 153 MG/DL


()  H


 


Test


 10/1/20


03:50 


 


 





 


White Blood Count


 6.5 K/UL


(4.8-10.8) 


 


 





 


Red Blood Count


 2.77 M/UL


(4.20-5.40)  L 


 


 





 


Hemoglobin


 8.6 G/DL


(12.0-16.0)  L 


 


 





 


Hematocrit


 25.7 %


(37.0-47.0)  L 


 


 





 


Mean Corpuscular Volume 93 FL (80-99)     


 


Mean Corpuscular Hemoglobin


 31.1 PG


(27.0-31.0)  H 


 


 





 


Mean Corpuscular Hemoglobin


Concent 33.4 G/DL


(32.0-36.0) 


 


 





 


Red Cell Distribution Width


 16.4 %


(11.6-14.8)  H 


 


 





 


Platelet Count


 125 K/UL


(150-450)  L 


 


 





 


Mean Platelet Volume


 8.5 FL


(6.5-10.1) 


 


 





 


Neutrophils (%) (Auto)


 76.6 %


(45.0-75.0)  H 


 


 





 


Lymphocytes (%) (Auto)


 17.9 %


(20.0-45.0)  L 


 


 





 


Monocytes (%) (Auto)


 5.1 %


(1.0-10.0) 


 


 





 


Eosinophils (%) (Auto)


 0.1 %


(0.0-3.0) 


 


 





 


Basophils (%) (Auto)


 0.3 %


(0.0-2.0) 


 


 





 


Sodium Level


 145 MMOL/L


(136-145) 


 


 





 


Potassium Level


 3.2 MMOL/L


(3.5-5.1)  L 


 


 





 


Chloride Level


 112 MMOL/L


()  H 


 


 





 


Carbon Dioxide Level


 28 MMOL/L


(21-32) 


 


 





 


Anion Gap


 5 mmol/L


(5-15) 


 


 





 


Blood Urea Nitrogen


 20 mg/dL


(7-18)  H 


 


 





 


Creatinine


 0.6 MG/DL


(0.55-1.30) 


 


 





 


Estimat Glomerular Filtration


Rate > 60 mL/min


(>60) 


 


 





 


Glucose Level


 169 MG/DL


()  H 


 


 





 


Calcium Level


 7.2 MG/DL


(8.5-10.1)  L 


 


 














Plan


Problems:  


(1) Respiratory distress


Assessment & Plan:  Respiratory insufficiency requiring prolonged ventilator 

support.  Patient on minimal vent settings right now currently in stable but 

unfortunately not safe for extubation.  Tracheostomy is indicated recommended.  

I discussed case with pulmonology team ICU team medical teams.  Patient unable 

to make decisions and her current condition and given her history.  The care 

team has been contacted and will be reviewed for evaluation and consideration of

the tracheostomy.  If consented I think it is reasonable for tracheostomy given 

patient's current CODE STATUS care plan and goals of care.  Extubation his curr

ent status is potentially high risk for reintubation emergency complication.  We

will plan for tracheostomy if consent is obtained.  Thank you for let me 

participate patient's care will follow with recommendations





(2) Encephalopathy chronic


(3) Dysphagia


(4) Down's syndrome


(5) Sepsis


(6) Acute respiratory failure


(7) Pneumonia


(8) Trisomy 21, Down syndrome


(9) DAVID (acute kidney injury)


(10) Bacteremia


(11) Hypokalemia


(12) Anemia


(13) Diarrhea


(14) Hypernatremia


(15) Pneumothorax


(16) Pneumothorax, right


(17) Cardiac arrest


(18) ARDS (adult respiratory distress syndrome)


(19) Septic shock


(20) HCAP (healthcare-associated pneumonia)


(21) Respiratory failure requiring intubation


(22) Seizure disorder


(23) Hypothyroidism











Jake Aranda                 Oct 1, 2020 12:35

## 2020-10-01 NOTE — NUR
NURSE NOTES:

PROVIDED PAIN RELIEF PRIOR TO DRESSING CHANGE OF ABDOMEN AND CHEST TUBE AREA. PT S/S PAIN 
3/10, GRIMACE, SQUIRMING, DIFFICULT TO COMFORT. TYLENOL 650 20.3ML, VIA GT GIVEN.

## 2020-10-01 NOTE — NUR
NURSE NOTES:

LATE ENTRY:

REPORT RECEIVED FROM MADELIN EDGARNJanette A/O TO SELF. PT AWAKENS TO NAME, FOLLOWS SIMPLE 
COMMANDS. 3MM BRISK BILATERAL. SB ON MONITOR,. BP STABLE.  PT INTUBATED, ETT 7.5, 22CM AT 
LIP. AC 24, , FI02 60%. GAG PRESENT. SECRETIONS THIN, WHITE. BILATERAL RHONCHI, LOWER 
LOBES DIMINISHED.  HOB >30, ASPIRATION PRECAUTIONS. NO RESPIRATORY DISTRESS NOTED. ABDOMEN 
ROUND, NONTENDER. BOWEL SOUNDS HYPERACTIVE. G TUBE NOTED. NO RESIDUALS. TUBE FEEDING A.F 
1.2, RUNNING AT 50ML/HR. 100 ML FLUSH FREE WATER. NO BM AT THIS TIME. WESTBROOK IN PLACE 
DRAINING YELLOW URINE. DRAINING BELOW BLADDER. SKIN - SEE ASSESSMENT. BILATERAL RADIAL 
PULSES WEAK.  TRACE EDEMA NOTED ON FEET. NO JVD. CONTACT ISOLATION.  BED LOCKED IN LOW 
POSITION. EZRA CONTINUE TO MONITOR PT.

## 2020-10-01 NOTE — NUR
NURSE HAND-OFF REPORT: 



Latest Vital Signs: Temperature 98.8 , Pulse 59 , B/P 123 /54 , Respiratory Rate 25 , O2 SAT 
96 , Mechanical Ventilator, O2 Flow Rate 15.0 .  

Vital Sign Comment: 



EKG Rhythm: Sinus Bradycardia

Rhythm change?: N 

MD Notified?: RENU Carver MD Response: No New Orders Received



Latest Momin Fall Score: 70  

Fall Risk: High Risk 

Safety Measures: Call light Within Reach, Bed Alarm Zone 1, Side Rails Side Rails x3, Bed 
position Low and Locked.

Fall Precautions: 

Yellow Socks

Door Sign

Patient Fall Education



Report given to .

## 2020-10-01 NOTE — GENERAL PROGRESS NOTE
Progress Note


Progress Note


Bioethics Committee Meeting October 1, 2020





The Committee met and included , director of nursing, director case

management, Jeremie Eastman M.D and myself. Dr. Cherry has asked for our opinion 

re a no code order. She is under the auspices of the [facility] for medical

decision making. While seriously ill with pneumonia and intubated since late 

August her condition is currently improving with improved FI02 and no off 

pressors. She is awake and appears responsive. The Committee was of the opinion 

that despite the fact that at her age she is close to the longevity limit for 

Down's syndrome at the moment her code status should remain full code. Should 

her condition deteriorate we will be glad to reassess.








Alexis Diaz M.D.





Bioethics Chair











Alexis Diaz MD          Oct 1, 2020 13:17

## 2020-10-01 NOTE — PULMONOLGY CRITICAL CARE NOTE
Critical Care - Asmt/Plan


Problems:  


(1) Acute respiratory failure


(2) Pneumothorax


(3) Bacteremia


(4) Pneumonia


(5) Sepsis


(6) HCAP (healthcare-associated pneumonia)


(7) Seizure disorder


(8) Down's syndrome


(9) Trisomy 21, Down syndrome


Respiratory:  adjust tidal volume, monitor respiratory rate, adjust FIO2, CXR


Cardiac:  stop pressors


Renal:  F/U I&O, keep IV fluid, check electrolytes


Infectious Disease:  continue antibiotics


Gastrointestinal:  continue feedings/current rate


Endocrine:  continue sliding scale insulin


Hematologic:  transfuse if hgb<8.5


Neurologic:  keep patient comfortable


Affect:  PRN ativan


Notes Reviewed:  internist





Critical Care - Objective





Last 24 Hour Vital Signs








  Date Time  Temp Pulse Resp B/P (MAP) Pulse Ox O2 Delivery O2 Flow Rate FiO2


 


10/1/20 07:06  61 24     60


 


10/1/20 06:00  59 25 123/54 (77) 96   


 


10/1/20 05:00  45 25 100/54 (69) 96   


 


10/1/20 04:00 98.8 48 24 99/56 (70) 96   


 


10/1/20 04:00        60


 


10/1/20 04:00      Mechanical Ventilator  





      Mechanical Ventilator  





      Mechanical Ventilator  


 


10/1/20 04:00  48      


 


10/1/20 03:30  61 24     60


 


10/1/20 03:00  66 23 118/72 (87) 97   


 


10/1/20 02:00  60 21 136/78 (97) 96   


 


10/1/20 01:00  63 16 133/80 (97) 94   


 


10/1/20 00:00      Mechanical Ventilator  





      Mechanical Ventilator  





      Mechanical Ventilator  


 


10/1/20 00:00        60


 


10/1/20 00:00  50      


 


10/1/20 00:00  63 19 122/74 (90) 95   


 


9/30/20 23:00  62 19 109/70 (83) 95   


 


9/30/20 22:49  62 24     60


 


9/30/20 22:00  46 28 114/77 (89) 94   


 


9/30/20 21:00  56 23 125/61 (82) 94   


 


9/30/20 20:00        60


 


9/30/20 20:00 98.5 58 25 132/76 (94) 93   


 


9/30/20 20:00      Mechanical Ventilator  





      Mechanical Ventilator  





      Mechanical Ventilator  


 


9/30/20 20:00  48      


 


9/30/20 19:15  43 24     60


 


9/30/20 19:00  52 23 137/73 (94) 94   


 


9/30/20 18:00  58 27 135/75 (95) 92   


 


9/30/20 17:00  68 32 125/83 (97) 93   


 


9/30/20 16:00 98.9 52 22 104/88 (93) 94   


 


9/30/20 16:00      Mechanical Ventilator  





      Mechanical Ventilator  





      Mechanical Ventilator  


 


9/30/20 16:00        60


 


9/30/20 15:41  52      


 


9/30/20 15:15  54 24     60


 


9/30/20 15:00  43 24 129/57 (81) 95   


 


9/30/20 14:00  43 25 118/59 (78) 94   


 


9/30/20 13:00  48 24 130/60 (83) 95   


 


9/30/20 12:00        60


 


9/30/20 12:00  46 24 152/68 (96) 96   


 


9/30/20 12:00      Mechanical Ventilator  





      Mechanical Ventilator  





      Mechanical Ventilator  


 


9/30/20 11:35  61      


 


9/30/20 11:06  61 24     60


 


9/30/20 11:00  52 21 142/93 (109) 94   








Status:  awake


Condition:  critical


HEENT:  atraumatic, normocephalic


Neck:  full ROM


Lungs:  chest wall tender


Heart:  HR/BP stable


Abdomen:  soft


Extremities:  edema


Decubiti:  location


Accucheck:  150





Critical Care - Subjective


ROS Limited/Unobtainable:  Yes


Condition:  critical


EKG Rhythm:  Sinus Rhythm


FI02:  60


Vent Support Breath Rate:  24


Vent Support Mode:  AC


Vent Tidal Volume:  500


Sputum Amount:  Small


PEEP:  0.0


PIP:  34


Tube Feeding Amount:  50


I&O:











Intake and Output  


 


 9/30/20 10/1/20





 19:00 07:00


 


Intake Total 650 ml 750 ml


 


Output Total 530 ml 565 ml


 


Balance 120 ml 185 ml


 


  


 


Free Water  200 ml


 


Tube Feeding 550 ml 550 ml


 


Other 100 ml 


 


Output Urine Total 500 ml 540 ml


 


Chest Tube Drainage Total 30 ml 25 ml


 


# Bowel Movements  2








CXR:


lungs reexpanded, ET tube in place


ET-Tube:  7.5


ET Position:  22


Labs:





Laboratory Tests








Test


 9/30/20


13:10 9/30/20


18:14 9/30/20


20:53 9/30/20


23:49


 


POC Whole Blood Glucose


 133 MG/DL


()  H 92 MG/DL


() Pending  


 153 MG/DL


()  H


 


Test


 10/1/20


03:50 


 


 





 


White Blood Count


 6.5 K/UL


(4.8-10.8) 


 


 





 


Red Blood Count


 2.77 M/UL


(4.20-5.40)  L 


 


 





 


Hemoglobin


 8.6 G/DL


(12.0-16.0)  L 


 


 





 


Hematocrit


 25.7 %


(37.0-47.0)  L 


 


 





 


Mean Corpuscular Volume 93 FL (80-99)     


 


Mean Corpuscular Hemoglobin


 31.1 PG


(27.0-31.0)  H 


 


 





 


Mean Corpuscular Hemoglobin


Concent 33.4 G/DL


(32.0-36.0) 


 


 





 


Red Cell Distribution Width


 16.4 %


(11.6-14.8)  H 


 


 





 


Platelet Count


 125 K/UL


(150-450)  L 


 


 





 


Mean Platelet Volume


 8.5 FL


(6.5-10.1) 


 


 





 


Neutrophils (%) (Auto)


 76.6 %


(45.0-75.0)  H 


 


 





 


Lymphocytes (%) (Auto)


 17.9 %


(20.0-45.0)  L 


 


 





 


Monocytes (%) (Auto)


 5.1 %


(1.0-10.0) 


 


 





 


Eosinophils (%) (Auto)


 0.1 %


(0.0-3.0) 


 


 





 


Basophils (%) (Auto)


 0.3 %


(0.0-2.0) 


 


 





 


Sodium Level


 145 MMOL/L


(136-145) 


 


 





 


Potassium Level


 3.2 MMOL/L


(3.5-5.1)  L 


 


 





 


Chloride Level


 112 MMOL/L


()  H 


 


 





 


Carbon Dioxide Level


 28 MMOL/L


(21-32) 


 


 





 


Anion Gap


 5 mmol/L


(5-15) 


 


 





 


Blood Urea Nitrogen


 20 mg/dL


(7-18)  H 


 


 





 


Creatinine


 0.6 MG/DL


(0.55-1.30) 


 


 





 


Estimat Glomerular Filtration


Rate > 60 mL/min


(>60) 


 


 





 


Glucose Level


 169 MG/DL


()  H 


 


 





 


Calcium Level


 7.2 MG/DL


(8.5-10.1)  Chano Owusu MD               Oct 1, 2020 10:25

## 2020-10-01 NOTE — NUR
NURSE NOTES:

LATE ENTRY:

PER EMAR ADMINISTERED SOLU- CORTEF 1ML IVP 50MG. VIA PICC LINE. PATENT, DRESSING DRY AND 
INTACT.

## 2020-10-01 NOTE — NUR
NURSE NOTES:

LATE ENTRY:

PT AWAKENS TO NAME. 3MM BRISK BILATERAL. SB ON MONITOR,. BP STABLE.  PT INTUBATED, ETT 7.5, 
22CM AT LIP. AC 24, , FI02 60%. SECRETIONS THIN, WHITE.  NO RESPIRATORY DISTRESS 
NOTED. ABDOMEN ROUND, NONTENDER. BOWEL SOUNDS HYPERACTIVE. G TUBE NOTED. NO RESIDUALS. TUBE 
FEEDING A.F 1.2, RUNNING AT 50ML/HR. 100 ML FLUSH FREE WATER. ONE WATERY BM, LARGE. WESTBROOK 
DRAINING YELLOW URINE.BILATERAL RADIAL PULSES WEAK.  TRACE EDEMA NOTED ON FEET. CONTACT 
ISOLATION.  BED LOCKED IN LOW POSITION. WILL CONTINUE TO MONITOR PT.

## 2020-10-01 NOTE — NEPHROLOGY PROGRESS NOTE
Assessment/Plan


Problem List:  


(1) DAVID (acute kidney injury)


(2) Respiratory failure requiring intubation


(3) Down's syndrome


(4) Seizure disorder


(5) Hypothyroidism


Assessment





Acute renal failure, likely due to hypotension


Acute respiratory distress, hypoxia


Seizure disorder


Hypothyroidism


Down syndrome


Full code











Fluid challenge with IV fluids and albumin


Midodrine for BP above 100 systolic


Check TSH level


Check


Correct level


Monitor renal parameters


Urine studies


Per orders


Plan


October 1: Intubated on ventilator.  Labs reviewed.  Potassium supplement given.

 Remains bradycardic.  Continue per consultants.


September 30: Labs reviewed.  Electrolyte abnormalities addressed.  Heart rate 

remains bradycardic.  Continue per cardiology.  Continue to monitor renal 

parameters and electrolytes.


September 29: Labs reviewed.  Electrolyte abnormalities addressed and replaced. 

Medication list reviewed.  Heart rate remains mid 50s.  Continue as is.


September 28: On ventilator.  Full code.  Heart rate low.  Will discontinue 

Midodrin.  Continue to rest.  Electrolytes within normal limit.  Discussed with 

RN.


September 27: Remains intubated.  Full code.  No plan for tracheostomy yet.  

Labs reviewed.  All acceptable.  Continue same management


September 26: Labs reviewed.  Discussed with RN.  Potassium and phosphorus and 

magnesium replacement ordered.  Hemoglobin 9.5.  Patient remains full code.  

Continue per consultants.


September 25: Lab reviewed.  Renal parameters stable.  Full code.  Intubated.  

Electrolyte abnormalities addressed and replacement done.


September 24: Lab reviewed.  Renal parameters stable.  Full code.  Intubated.  

Has right side chest tube.  Continue per consultants.


September 23: Lab reviewed.  Renal parameters stable.  Full code.  Has right 

chest tube.  Planning process for tracheostomy.  Defer to chest and general 

surgeon.


September 22: Labs reviewed.  Renal parameters stable.  Remains full code.  

Remains on ventilator.  Due for tracheostomy tomorrow.  Continue per 

consultants.  Patient has a right chest tube in place at this time.


September 21: Labs reviewed.  Electrolyte imbalances addressed and supplemented.

 Remains full code and on ventilator.  Continue to monitor renal parameters.  

Continue per consultants.


September 20: Labs reviewed.  Serum potassium again low today.  Potassium 

supplement IV and through GT given.  Patient remains full code and is on 

ventilator.  Continue per consultants.  Continue to monitor electrolytes and 

renal parameters.


September 19: Labs reviewed.  Abnormal electrolyte addressed.  Remains full 

code.  Remains vented.


September 18: Day 27 of hospitalization.  Full code.  Labs reviewed.  Hemoglobin

down to 7.5.  Electrolyte abnormalities addressed and corrections ordered.  

Continue to monitor renal parameters.  Continue per consultants.  Start on 

Levemir for blood sugar management.  Questioning continuation of hydrocortisone?


September 17: Labs reviewed.  Potassium, phosphorus, hemoglobin, are all low.  

Potassium and phosphorus IV replacement given.  Continue to monitor electrolytes

and CBC.  Patient remains full code.


September 16: Lab reviewed.  Low phosphorus low magnesium and low potassium was 

addressed.  Hemoglobin drifting lower.  Continue per consultants.  Patient 

remains full code.


September 15: Labs reviewed.  Patient continues to be on ventilator.  D5W for 

high sodium and also potassium chloride intravenously as supplement given.  

Hemoglobin 8.4.  Continue to monitor electrolytes and renal parameters.


September 14: Labs reviewed.  Low potassium and high sodium noted.  Hemoglobin 

8.1 stable.  Aim to correct abnormal electrolyte.  Continue rest.  Will give 2 

boluses of D5W 500 cc.


September 13: Lab reviewed.  Abnormal electrolytes noted and addressed.


September 12: Labs reviewed.  Potassium supplement given.  Patient remains full 

code.  Continue per consultants.


September 11: Lab reviewed.  Electrolyte abnormalities addressed.  Continue per 

pulmonary and ID.


September 10: Lab reviewed.  Status unchanged.  Serum sodium 151 unchanged.  

Stable from renal standpoint of view.


September 9: Labs reviewed.  Status quo.  D5W 500 cc IV ordered.  Continue to 

monitor renal parameters.


September 8: Status quo.  Labs reviewed.  Overall condition unchanged.  Patient 

was transfused and hemoglobin higher.  Continue current management.  Patient 

remains full code.


September 7: Status quo.  Overall condition poor.  Very low albumin.  Edematous.

 Hypotensive.  Hemoglobin lower.  Anemia work-up ordered.  I favor transfusion 2

units of packed RBCs.  Patient remains full code.  I favor supportive care only.

 Will discuss.


September 6: Electrolyte abnormalities addressed.  Serum creatinine lower.  

Continue per current management.


September 5: Status unchanged.  Lab reviewed.  Serum potassium 2.7.  IV 

potassium chloride ordered.  Serum creatinine low at 1.6 stable.  Blood pressure

90s systolic


September 4: Status quo.  Labs reviewed.  Renal parameters stable.  Serum 

creatinine down to 1.6.  Medication list reviewed.  Continues to be on 

midodrine.  Continue per consultants.


September 3: Status quo.  Labs reviewed.  Electrolytes adjusted.  Serum 

creatinine down to 1.8.  Continue per consultants.


September 2: Status quo.  Labs reviewed.  Phosphorus supplement IV given.  Serum

creatinine 2.  Continue per consultants.


September 1: Requires less pressors.  Albumin bolus given.  1 dose of Lasix IV 

ordered as the patient severely edematous.  Patient serum albumin is very low.  

Continue per consultants.


August 31: Continues to be intubated.  Labs reviewed.  Serum creatinine 1.9 

unchanged.  Blood pressure more stable.  Off 1 of the pressors.  Continue to 

monitor renal parameters.  Continue per consultants.  Patient now on 

hydrocortisone 100 mg every 8 hours.  Will decrease IV fluid.  Normal saline 

down to 50 cc an hour.


August 30: Intubated.  Labs reviewed.  Creatinine 1.9 unchanged.  Continue same 

treatment plan.  Per consultants.  Overall poor prognosis since the patient 

remains on pressors and her pulmonary status is worsening.


August 29: Remains intubated.  Labs reviewed.  Creatinine 1.9.  Blood pressure 

systolic 90s.  Continue per consultants.


August 28: Remains intubated.  Labs reviewed.  Serum creatinine lower to 2.  

Vancomycin level lower.  Remains hypotensive on pressors.  Will increase 

midodrine to 10 mg every 8 hours.  Continue per consultants.  Continue to 

monitor renal parameters.


August 27: Patient now in ICU.  Intubated.  On pressors.  Labs reviewed.  Will 

increase midodrine.  Aim to keep blood pressure over 100 systolic.  Will give 

albumin bolus.  Will check vancomycin level which was elevated when checked 

previously on August 24.  Will monitor renal parameters.  Continue per 

consultants.





Subjective


ROS Limited/Unobtainable:  Yes





Objective


Objective





Last 24 Hour Vital Signs








  Date Time  Temp Pulse Resp B/P (MAP) Pulse Ox O2 Delivery O2 Flow Rate FiO2


 


10/1/20 07:06  61 24     60


 


10/1/20 06:00  59 25 123/54 (77) 96   


 


10/1/20 05:00  45 25 100/54 (69) 96   


 


10/1/20 04:00 98.8 48 24 99/56 (70) 96   


 


10/1/20 04:00        60


 


10/1/20 04:00      Mechanical Ventilator  





      Mechanical Ventilator  





      Mechanical Ventilator  


 


10/1/20 04:00  48      


 


10/1/20 03:30  61 24     60


 


10/1/20 03:00  66 23 118/72 (87) 97   


 


10/1/20 02:00  60 21 136/78 (97) 96   


 


10/1/20 01:00  63 16 133/80 (97) 94   


 


10/1/20 00:00      Mechanical Ventilator  





      Mechanical Ventilator  





      Mechanical Ventilator  


 


10/1/20 00:00        60


 


10/1/20 00:00  50      


 


10/1/20 00:00  63 19 122/74 (90) 95   


 


9/30/20 23:00  62 19 109/70 (83) 95   


 


9/30/20 22:49  62 24     60


 


9/30/20 22:00  46 28 114/77 (89) 94   


 


9/30/20 21:00  56 23 125/61 (82) 94   


 


9/30/20 20:00        60


 


9/30/20 20:00 98.5 58 25 132/76 (94) 93   


 


9/30/20 20:00      Mechanical Ventilator  





      Mechanical Ventilator  





      Mechanical Ventilator  


 


9/30/20 20:00  48      


 


9/30/20 19:15  43 24     60


 


9/30/20 19:00  52 23 137/73 (94) 94   


 


9/30/20 18:00  58 27 135/75 (95) 92   


 


9/30/20 17:00  68 32 125/83 (97) 93   


 


9/30/20 16:00 98.9 52 22 104/88 (93) 94   


 


9/30/20 16:00      Mechanical Ventilator  





      Mechanical Ventilator  





      Mechanical Ventilator  


 


9/30/20 16:00        60


 


9/30/20 15:41  52      


 


9/30/20 15:15  54 24     60


 


9/30/20 15:00  43 24 129/57 (81) 95   


 


9/30/20 14:00  43 25 118/59 (78) 94   


 


9/30/20 13:00  48 24 130/60 (83) 95   


 


9/30/20 12:00        60


 


9/30/20 12:00  46 24 152/68 (96) 96   


 


9/30/20 12:00      Mechanical Ventilator  





      Mechanical Ventilator  





      Mechanical Ventilator  


 


9/30/20 11:35  61      


 


9/30/20 11:06  61 24     60


 


9/30/20 11:00  52 21 142/93 (109) 94   


 


9/30/20 10:00  49 26 140/75 (96) 95   

















Intake and Output0  


 


 9/30/20 10/1/20





 19:00 07:00


 


Intake Total 650 ml 750 ml


 


Output Total 530 ml 565 ml


 


Balance 120 ml 185 ml


 


  


 


Free Water  200 ml


 


Tube Feeding 550 ml 550 ml


 


Other 100 ml 


 


Output Urine Total 500 ml 540 ml


 


Chest Tube Drainage Total 30 ml 25 ml


 


# Bowel Movements  2








Laboratory Tests


9/30/20 13:10: POC Whole Blood Glucose 133H


9/30/20 18:14: POC Whole Blood Glucose 92


9/30/20 20:53: POC Whole Blood Glucose [Pending]


9/30/20 23:49: POC Whole Blood Glucose 153H


10/1/20 03:50: 


White Blood Count 6.5, Red Blood Count 2.77L, Hemoglobin 8.6L, Hematocrit 25.7L,

 Mean Corpuscular Volume 93, Mean Corpuscular Hemoglobin 31.1H, Mean Corpuscular

 Hemoglobin Concent 33.4, Red Cell Distribution Width 16.4H, Platelet Count 125L

, Mean Platelet Volume 8.5, Neutrophils (%) (Auto) 76.6H, Lymphocytes (%) (Auto)

 17.9L, Monocytes (%) (Auto) 5.1, Eosinophils (%) (Auto) 0.1, Basophils (%) 

(Auto) 0.3, Sodium Level 145, Potassium Level 3.2L, Chloride Level 112H, Carbon 

Dioxide Level 28, Anion Gap 5, Blood Urea Nitrogen 20H, Creatinine 0.6, Estimat 

Glomerular Filtration Rate > 60, Glucose Level 169H, Calcium Level 7.2L


Height (Feet):  5


Height (Inches):  3.00


Weight (Pounds):  172


General Appearance:  no apparent distress


EENT:  other - On ventilator


Cardiovascular:  bradycardia


Respiratory/Chest:  decreased breath sounds


Abdomen:  distended











Lam Nino MD             Oct 1, 2020 09:22

## 2020-10-01 NOTE — GENERAL PROGRESS NOTE
Subjective


ROS Limited/Unobtainable:  No


Allergies:  


Coded Allergies:  


     No Known Allergies (Unverified , 1/8/19)





Objective





Last 24 Hour Vital Signs








  Date Time  Temp Pulse Resp B/P (MAP) Pulse Ox O2 Delivery O2 Flow Rate FiO2


 


10/1/20 13:00 98.9 48 24 114/50 (71) 94   


 


10/1/20 12:00  58 24 128/54 (78) 93   


 


10/1/20 12:00  58      


 


10/1/20 12:00      Mechanical Ventilator  





      Mechanical Ventilator  





      Mechanical Ventilator  


 


10/1/20 12:00        60


 


10/1/20 11:00  57 22 122/72 (89) 96   


 


10/1/20 10:00  47 26 118/58 (78) 95   


 


10/1/20 09:00  59 21 114/51 (72) 96   


 


10/1/20 08:00  74 30 132/65 (87) 99   


 


10/1/20 08:00        60


 


10/1/20 08:00  59      


 


10/1/20 08:00      Mechanical Ventilator  





      Mechanical Ventilator  





      Mechanical Ventilator  


 


10/1/20 07:06  61 24     60


 


10/1/20 07:00 98.0 56 23 120/68 (85) 94   


 


10/1/20 06:00  59 25 123/54 (77) 96   


 


10/1/20 05:00  45 25 100/54 (69) 96   


 


10/1/20 04:00 98.8 48 24 99/56 (70) 96   


 


10/1/20 04:00        60


 


10/1/20 04:00      Mechanical Ventilator  





      Mechanical Ventilator  





      Mechanical Ventilator  


 


10/1/20 04:00  48      


 


10/1/20 03:30  61 24     60


 


10/1/20 03:00  66 23 118/72 (87) 97   


 


10/1/20 02:00  60 21 136/78 (97) 96   


 


10/1/20 01:00  63 16 133/80 (97) 94   


 


10/1/20 00:00      Mechanical Ventilator  





      Mechanical Ventilator  





      Mechanical Ventilator  


 


10/1/20 00:00        60


 


10/1/20 00:00  50      


 


10/1/20 00:00  63 19 122/74 (90) 95   


 


9/30/20 23:00  62 19 109/70 (83) 95   


 


9/30/20 22:49  62 24     60


 


9/30/20 22:00  46 28 114/77 (89) 94   


 


9/30/20 21:00  56 23 125/61 (82) 94   


 


9/30/20 20:00        60


 


9/30/20 20:00 98.5 58 25 132/76 (94) 93   


 


9/30/20 20:00      Mechanical Ventilator  





      Mechanical Ventilator  





      Mechanical Ventilator  


 


9/30/20 20:00  48      


 


9/30/20 19:15  43 24     60


 


9/30/20 19:00  52 23 137/73 (94) 94   


 


9/30/20 18:00  58 27 135/75 (95) 92   


 


9/30/20 17:00  68 32 125/83 (97) 93   


 


9/30/20 16:00 98.9 52 22 104/88 (93) 94   


 


9/30/20 16:00      Mechanical Ventilator  





      Mechanical Ventilator  





      Mechanical Ventilator  


 


9/30/20 16:00        60


 


9/30/20 15:41  52      


 


9/30/20 15:15  54 24     60


 


9/30/20 15:00  43 24 129/57 (81) 95   

















Intake and Output  


 


 9/30/20 10/1/20





 19:00 07:00


 


Intake Total 650 ml 800 ml


 


Output Total 530 ml 610 ml


 


Balance 120 ml 190 ml


 


  


 


Free Water  200 ml


 


Tube Feeding 550 ml 600 ml


 


Other 100 ml 


 


Output Urine Total 500 ml 585 ml


 


Chest Tube Drainage Total 30 ml 25 ml


 


# Bowel Movements  2








Laboratory Tests


9/30/20 18:14: POC Whole Blood Glucose 92


9/30/20 20:53: POC Whole Blood Glucose [Pending]


9/30/20 23:49: POC Whole Blood Glucose 153H


10/1/20 03:50: 


White Blood Count 6.5, Red Blood Count 2.77L, Hemoglobin 8.6L, Hematocrit 25.7L,

 Mean Corpuscular Volume 93, Mean Corpuscular Hemoglobin 31.1H, Mean Corpuscular

 Hemoglobin Concent 33.4, Red Cell Distribution Width 16.4H, Platelet Count 125L

, Mean Platelet Volume 8.5, Neutrophils (%) (Auto) 76.6H, Lymphocytes (%) (Auto)

 17.9L, Monocytes (%) (Auto) 5.1, Eosinophils (%) (Auto) 0.1, Basophils (%) 

(Auto) 0.3, Sodium Level 145, Potassium Level 3.2L, Chloride Level 112H, Carbon 

Dioxide Level 28, Anion Gap 5, Blood Urea Nitrogen 20H, Creatinine 0.6, Estimat 

Glomerular Filtration Rate > 60, Glucose Level 169H, Calcium Level 7.2L


Height (Feet):  5


Height (Inches):  3.00


Weight (Pounds):  172


General Appearance:  no apparent distress


EENT:  normal ENT inspection


Neck:  supple


Cardiovascular:  normal rate


Respiratory/Chest:  decreased breath sounds


Abdomen:  normal bowel sounds, non tender, soft


Extremities:  non-tender





Assessment/Plan


Status:  stable, not improved, unchanged


Assessment/Plan:


1. History of Down syndrome.


2. Dysphagia with G-tube.


3. Seizure disorder.


4. Hypothyroidism.


5. DAVID.


6. Pneumonia.


7. Sepsis.








fu H&H


prn blood transfusion to keep HGB above 7


ppi


GTF


hold GI procedures for now


stool ob + 1/2











Ted Nagy MD              Oct 1, 2020 14:10

## 2020-10-01 NOTE — NUR
NURSE NOTES:

Called RT to place pt back on BIPAP. resp at 30 per min. pt is sating 90% O2. pts is alert 
and oriented times 4. other vital signs stable. 

-------------------------------------------------------------------------------

Addendum: 10/01/20 at 2125 by SYED HALL RN

-------------------------------------------------------------------------------

WRONG PT.

## 2020-10-02 VITALS — DIASTOLIC BLOOD PRESSURE: 64 MMHG | SYSTOLIC BLOOD PRESSURE: 106 MMHG

## 2020-10-02 VITALS — SYSTOLIC BLOOD PRESSURE: 131 MMHG | DIASTOLIC BLOOD PRESSURE: 71 MMHG

## 2020-10-02 VITALS — DIASTOLIC BLOOD PRESSURE: 65 MMHG | SYSTOLIC BLOOD PRESSURE: 121 MMHG

## 2020-10-02 VITALS — DIASTOLIC BLOOD PRESSURE: 65 MMHG | SYSTOLIC BLOOD PRESSURE: 112 MMHG

## 2020-10-02 VITALS — DIASTOLIC BLOOD PRESSURE: 84 MMHG | SYSTOLIC BLOOD PRESSURE: 121 MMHG

## 2020-10-02 VITALS — SYSTOLIC BLOOD PRESSURE: 139 MMHG | DIASTOLIC BLOOD PRESSURE: 82 MMHG

## 2020-10-02 VITALS — SYSTOLIC BLOOD PRESSURE: 127 MMHG | DIASTOLIC BLOOD PRESSURE: 90 MMHG

## 2020-10-02 VITALS — SYSTOLIC BLOOD PRESSURE: 107 MMHG | DIASTOLIC BLOOD PRESSURE: 61 MMHG

## 2020-10-02 VITALS — DIASTOLIC BLOOD PRESSURE: 64 MMHG | SYSTOLIC BLOOD PRESSURE: 109 MMHG

## 2020-10-02 VITALS — DIASTOLIC BLOOD PRESSURE: 58 MMHG | SYSTOLIC BLOOD PRESSURE: 106 MMHG

## 2020-10-02 VITALS — DIASTOLIC BLOOD PRESSURE: 87 MMHG | SYSTOLIC BLOOD PRESSURE: 129 MMHG

## 2020-10-02 VITALS — DIASTOLIC BLOOD PRESSURE: 59 MMHG | SYSTOLIC BLOOD PRESSURE: 115 MMHG

## 2020-10-02 VITALS — DIASTOLIC BLOOD PRESSURE: 70 MMHG | SYSTOLIC BLOOD PRESSURE: 123 MMHG

## 2020-10-02 VITALS — SYSTOLIC BLOOD PRESSURE: 113 MMHG | DIASTOLIC BLOOD PRESSURE: 67 MMHG

## 2020-10-02 VITALS — SYSTOLIC BLOOD PRESSURE: 121 MMHG | DIASTOLIC BLOOD PRESSURE: 61 MMHG

## 2020-10-02 VITALS — DIASTOLIC BLOOD PRESSURE: 100 MMHG | SYSTOLIC BLOOD PRESSURE: 146 MMHG

## 2020-10-02 VITALS — SYSTOLIC BLOOD PRESSURE: 99 MMHG | DIASTOLIC BLOOD PRESSURE: 69 MMHG

## 2020-10-02 VITALS — DIASTOLIC BLOOD PRESSURE: 74 MMHG | SYSTOLIC BLOOD PRESSURE: 94 MMHG

## 2020-10-02 VITALS — DIASTOLIC BLOOD PRESSURE: 79 MMHG | SYSTOLIC BLOOD PRESSURE: 132 MMHG

## 2020-10-02 VITALS — SYSTOLIC BLOOD PRESSURE: 114 MMHG | DIASTOLIC BLOOD PRESSURE: 59 MMHG

## 2020-10-02 VITALS — DIASTOLIC BLOOD PRESSURE: 60 MMHG | SYSTOLIC BLOOD PRESSURE: 117 MMHG

## 2020-10-02 VITALS — DIASTOLIC BLOOD PRESSURE: 72 MMHG | SYSTOLIC BLOOD PRESSURE: 100 MMHG

## 2020-10-02 VITALS — SYSTOLIC BLOOD PRESSURE: 104 MMHG | DIASTOLIC BLOOD PRESSURE: 71 MMHG

## 2020-10-02 VITALS — DIASTOLIC BLOOD PRESSURE: 60 MMHG | SYSTOLIC BLOOD PRESSURE: 107 MMHG

## 2020-10-02 VITALS — SYSTOLIC BLOOD PRESSURE: 108 MMHG | DIASTOLIC BLOOD PRESSURE: 63 MMHG

## 2020-10-02 VITALS — SYSTOLIC BLOOD PRESSURE: 124 MMHG | DIASTOLIC BLOOD PRESSURE: 101 MMHG

## 2020-10-02 VITALS — DIASTOLIC BLOOD PRESSURE: 65 MMHG | SYSTOLIC BLOOD PRESSURE: 105 MMHG

## 2020-10-02 LAB
ALBUMIN SERPL-MCNC: 1.3 G/DL (ref 3.4–5)
ALBUMIN/GLOB SERPL: 0.4 {RATIO} (ref 1–2.7)
ALP SERPL-CCNC: 61 U/L (ref 46–116)
ALT SERPL-CCNC: 26 U/L (ref 12–78)
ANION GAP SERPL CALC-SCNC: 8 MMOL/L (ref 5–15)
AST SERPL-CCNC: 18 U/L (ref 15–37)
BILIRUB SERPL-MCNC: 0.5 MG/DL (ref 0.2–1)
BUN SERPL-MCNC: 19 MG/DL (ref 7–18)
CALCIUM SERPL-MCNC: 7.3 MG/DL (ref 8.5–10.1)
CHLORIDE SERPL-SCNC: 113 MMOL/L (ref 98–107)
CO2 SERPL-SCNC: 26 MMOL/L (ref 21–32)
CREAT SERPL-MCNC: 0.7 MG/DL (ref 0.55–1.3)
GLOBULIN SER-MCNC: 3.5 G/DL
PHOSPHATE SERPL-MCNC: 2.1 MG/DL (ref 2.5–4.9)
POTASSIUM SERPL-SCNC: 3.4 MMOL/L (ref 3.5–5.1)
SODIUM SERPL-SCNC: 147 MMOL/L (ref 136–145)

## 2020-10-02 PROCEDURE — 0W9B30Z DRAINAGE OF LEFT PLEURAL CAVITY WITH DRAINAGE DEVICE, PERCUTANEOUS APPROACH: ICD-10-PCS

## 2020-10-02 PROCEDURE — 0B21XEZ CHANGE ENDOTRACHEAL AIRWAY IN TRACHEA, EXTERNAL APPROACH: ICD-10-PCS

## 2020-10-02 RX ADMIN — HYDROCORTISONE SODIUM SUCCINATE SCH MG: 100 INJECTION, POWDER, FOR SOLUTION INTRAMUSCULAR; INTRAVENOUS at 22:01

## 2020-10-02 RX ADMIN — INSULIN ASPART SCH UNITS: 100 INJECTION, SOLUTION INTRAVENOUS; SUBCUTANEOUS at 12:00

## 2020-10-02 RX ADMIN — INSULIN DETEMIR SCH UNITS: 100 INJECTION, SOLUTION SUBCUTANEOUS at 09:09

## 2020-10-02 RX ADMIN — INSULIN ASPART SCH UNITS: 100 INJECTION, SOLUTION INTRAVENOUS; SUBCUTANEOUS at 05:43

## 2020-10-02 RX ADMIN — PANTOPRAZOLE SODIUM SCH MG: 40 INJECTION, POWDER, FOR SOLUTION INTRAVENOUS at 22:01

## 2020-10-02 RX ADMIN — HYDROCORTISONE SODIUM SUCCINATE SCH MG: 100 INJECTION, POWDER, FOR SOLUTION INTRAMUSCULAR; INTRAVENOUS at 05:41

## 2020-10-02 RX ADMIN — INSULIN ASPART SCH UNITS: 100 INJECTION, SOLUTION INTRAVENOUS; SUBCUTANEOUS at 17:51

## 2020-10-02 RX ADMIN — INSULIN DETEMIR SCH UNITS: 100 INJECTION, SOLUTION SUBCUTANEOUS at 22:03

## 2020-10-02 RX ADMIN — CHLORHEXIDINE GLUCONATE SCH APPLIC: 213 SOLUTION TOPICAL at 22:01

## 2020-10-02 RX ADMIN — Medication SCH MLS/HR: at 15:27

## 2020-10-02 RX ADMIN — PANTOPRAZOLE SODIUM SCH MG: 40 INJECTION, POWDER, FOR SOLUTION INTRAVENOUS at 09:06

## 2020-10-02 RX ADMIN — ACETAMINOPHEN PRN MG: 160 SOLUTION ORAL at 17:46

## 2020-10-02 RX ADMIN — Medication SCH MLS/HR: at 11:14

## 2020-10-02 RX ADMIN — HYDROCORTISONE SODIUM SUCCINATE SCH MG: 100 INJECTION, POWDER, FOR SOLUTION INTRAMUSCULAR; INTRAVENOUS at 13:38

## 2020-10-02 NOTE — GENERAL PROGRESS NOTE
Subjective


ROS Limited/Unobtainable:  No


Allergies:  


Coded Allergies:  


     No Known Allergies (Unverified , 1/8/19)





Objective





Last 24 Hour Vital Signs








  Date Time  Temp Pulse Resp B/P (MAP) Pulse Ox O2 Delivery O2 Flow Rate FiO2


 


10/2/20 08:30  46 25 106/64 (78) 95   


 


10/2/20 08:00  62      


 


10/2/20 08:00        60


 


10/2/20 08:00      Mechanical Ventilator  





      Mechanical Ventilator  





      Mechanical Ventilator  


 


10/2/20 08:00  43 24 114/59 (77) 95   


 


10/2/20 07:30  54 25 117/60 (79) 94   


 


10/2/20 07:10  95 24     55


 


10/2/20 07:00 98.8 46 25 131/71 (91) 95   


 


10/2/20 06:00  48 24 107/60 (76) 95   


 


10/2/20 05:00  54 24 108/63 (78) 95   


 


10/2/20 04:00  52      


 


10/2/20 04:00 98.7 73 27 124/101 (109) 96   


 


10/2/20 04:00        60


 


10/2/20 04:00      Mechanical Ventilator  





      Mechanical Ventilator  





      Mechanical Ventilator  


 


10/2/20 03:00  57 30 123/70 (87) 97   


 


10/2/20 02:45  70 24     55


 


10/2/20 02:00  67 24 109/64 (79) 96   


 


10/2/20 01:00  64 24 139/82 (101) 97   


 


10/2/20 00:00 98.3 60 24 132/79 (96) 97   


 


10/2/20 00:00        60


 


10/2/20 00:00      Mechanical Ventilator  





      Mechanical Ventilator  





      Mechanical Ventilator  


 


10/2/20 00:00  64      


 


10/1/20 23:00  61 24 102/59 (73) 96   


 


10/1/20 22:19  72 24     55


 


10/1/20 22:00  65 24 134/68 (90) 96   


 


10/1/20 21:00  64 24 118/69 (85) 96   


 


10/1/20 20:00  64      


 


10/1/20 20:00        60


 


10/1/20 20:00      Mechanical Ventilator  





      Mechanical Ventilator  





      Mechanical Ventilator  


 


10/1/20 20:00 98.7 62 24 126/104 (111) 96   


 


10/1/20 19:22  70 24     55


 


10/1/20 19:00  58 25 127/98 (108) 97   


 


10/1/20 18:00  46 27 92/57 (69) 95   


 


10/1/20 17:00  46 27 112/71 (85) 95   


 


10/1/20 16:05 99.0 69 21 110/57 (74) 95   


 


10/1/20 16:00        60


 


10/1/20 16:00  69 21 110/57 (74) 95   


 


10/1/20 16:00      Mechanical Ventilator  





      Mechanical Ventilator  





      Mechanical Ventilator  


 


10/1/20 16:00  67      


 


10/1/20 15:23  64 24     60


 


10/1/20 15:00  62 20 112/53 (72) 95   


 


10/1/20 14:00  66 24 115/67 (83) 94   


 


10/1/20 13:00 98.9 48 24 114/50 (71) 94   


 


10/1/20 12:00  58 24 128/54 (78) 93   


 


10/1/20 12:00  58      


 


10/1/20 12:00      Mechanical Ventilator  





      Mechanical Ventilator  





      Mechanical Ventilator  


 


10/1/20 12:00        60


 


10/1/20 11:30  62 24     60


 


10/1/20 11:00  57 22 122/72 (89) 96   

















Intake and Output 0 


 


 10/1/20 10/2/20





 19:00 07:00


 


Intake Total 800 ml 400 ml


 


Output Total 610 ml 555 ml


 


Balance 190 ml -155 ml


 


  


 


Free Water 100 ml 200 ml


 


IV Total 100 ml 


 


Tube Feeding 600 ml 200 ml


 


Output Urine Total 590 ml 545 ml


 


Chest Tube Drainage Total 20 ml 10 ml


 


# Bowel Movements 4 1








Laboratory Tests


10/1/20 18:28: POC Whole Blood Glucose 183H


10/1/20 20:05: POC Whole Blood Glucose [Pending]


10/1/20 23:34: POC Whole Blood Glucose 171H


10/2/20 03:25: 


Sodium Level 147H, Potassium Level 3.4L, Chloride Level 113H, Carbon Dioxide 

Level 26, Anion Gap 8, Blood Urea Nitrogen 19H, Creatinine 0.7, Estimat 

Glomerular Filtration Rate > 60, Glucose Level 180H, Calcium Level 7.3L, 

Phosphorus Level 2.1L, Magnesium Level 1.7L, Total Bilirubin 0.5, Aspartate 

Amino Transf (AST/SGOT) 18, Alanine Aminotransferase (ALT/SGPT) 26, Alkaline 

Phosphatase 61, Total Protein 4.8L, Albumin 1.3L, Globulin 3.5, Albumin/Globulin

 Ratio 0.4L


10/2/20 05:33: POC Whole Blood Glucose [Pending]


Height (Feet):  5


Height (Inches):  3.00


Weight (Pounds):  172


General Appearance:  no apparent distress


EENT:  normal ENT inspection


Neck:  supple


Cardiovascular:  normal rate


Respiratory/Chest:  decreased breath sounds


Abdomen:  normal bowel sounds, non tender, soft


Extremities:  non-tender





Assessment/Plan


Status:  stable, not improved, unchanged


Assessment/Plan:


1. History of Down syndrome.


2. Dysphagia with G-tube.


3. Seizure disorder.


4. Hypothyroidism.


5. DAVID.


6. Pneumonia.


7. Sepsis.








fu H&H


prn blood transfusion to keep HGB above 7


ppi


GTF


hold GI procedures for now


stool ob + 1/2











Ted Nagy MD              Oct 2, 2020 10:11

## 2020-10-02 NOTE — NUR
NURSE NOTES:

LATE ENTRY:

REPORT RECEIVED FROM ROCCO NGUYEN PT DROWSY, AWAKENS TO NAME. 3MM BRISK BILATERAL. PT 
INTUBATED, ETT 7.5, 22CM AT LIP. AC 24, , FI02 60%. GAG PRESENT. SECRETIONS THIN, 
WHITE. BILATERAL RHONCHI, LOWER LOBES DIMINISHED. HOB >30, ASPIRATION PRECAUTIONS. INCREASED 
WORK OF BREATHING. SATING 99%. ABDOMEN ROUND. BOWEL SOUNDS HYPERACTIVE. G TUBE NOTED. NO 
RESIDUALS. TUBE FEEDING A.F 1.2, RUNNING AT 50ML/HR. 100 ML FLUSH FREE WATER. NO BM AT THIS 
TIME. WESTBROOK IN PLACE DRAINING YELLOW URINE. DRAINING BELOW BLADDER. SKIN - SEE ASSESSMENT, 
WEEPING FROM ABDOMINAL FOLDS. BILATERAL RADIAL PULSES WEAK.  TRACE EDEMA NOTED ON FEET. NO 
JVD. CONTACT ISOLATION.  BED LOCKED IN LOW POSITION. WILL CONTINUE TO MONITOR PT.

## 2020-10-02 NOTE — DIAGNOSTIC IMAGING REPORT
Indication: Pneumothorax after intubation

 

Technique: Are performed at bedside. Emergency physician consent obtained

preprocedure. Prior imaging studies reviewed. Procedural timeout performed. The left

chest was sterilely prepped and draped. Local anesthesia with 1% lidocaine. Using

anatomic landmarks common the first intercostal space was punctured using a thoracic

vent catheter. This was deployed in the usual fashion. The stylette was removed. Some

of the pneumothorax was evacuated using a syringe. The catheter was fixed to the

skin, and placed to Pleur-evac suction. Follow-up radiograph performed after

aspiration, demonstrating partial evacuation of the pneumothorax. The patient

tolerated the procedure well, without immediate complication.

 

No fluoroscopy utilized. Single postprocedure radiograph was obtained

 

Comparison: 2 hours earlier

 

Findings: Completion image demonstrates satisfactory position of the ventricles are

in the first intercostal space, and partial reduction of previously demonstrated

large left tension pneumothorax

 

Impression: Apparently successful left chest vent catheter placement for treatment of

large tension pneumothorax

## 2020-10-02 NOTE — NUR
NURSE NOTES:

LATE ENTRY:

RECEIVED CALL FROM MD. ACOSTA, REPORTED LARGE LEFT LUNG PNEUMOTHORAX. MD WILL CONTACT SUHAS CLIFTON FOR CHEST TUBE INSERTION.

## 2020-10-02 NOTE — CARDIOLOGY PROGRESS NOTE
Assessment/Plan


Assessment/Plan


sepsis


respiratory failure 


ards 


renal insuf 


bacteremia


abn cardiac enzyme due to demand 


sinus arnold stable 


thrombocytopenia resolved  


hypernatremia 


pneumothorax now s/p chest tube 











vent support 


abx 


may be mobilizing her fluids


ct in place 


tsh seems fine 


remains off pressor still at this time 


hr inthe 60's s no sig pauses on tele 


tele personally reviewed 


awiat public guardian for code status adn soncent for trach 


d/w rn 


 








Subjective


Subjective


on  a vent not communicative AWAKE





Objective





Last 24 Hour Vital Signs








  Date Time  Temp Pulse Resp B/P (MAP) Pulse Ox O2 Delivery O2 Flow Rate FiO2


 


10/2/20 17:00  77 29 112/65 (81) 95   


 


10/2/20 16:00  53 24 105/65 (78) 97   


 


10/2/20 16:00  67      


 


10/2/20 16:00        60


 


10/2/20 15:10  78 24     55


 


10/2/20 15:00  78 19 94/74 (81) 97   


 


10/2/20 14:00  69 24 104/71 (82) 94   


 


10/2/20 13:00  67 28 99/69 (79) 94   


 


10/2/20 12:40  74 28     55


 


10/2/20 12:00 98.6 55 23 107/61 (76) 95   


 


10/2/20 12:00  68      


 


10/2/20 12:00      Mechanical Ventilator  





      Mechanical Ventilator  





      Mechanical Ventilator  


 


10/2/20 12:00        60


 


10/2/20 11:05  56 27     55


 


10/2/20 11:00  53 23 106/58 (74) 94   


 


10/2/20 10:00  57 34 121/65 (83) 100   


 


10/2/20 09:20  58 25 129/87 (101) 93   


 


10/2/20 09:00  54 25 146/100 (115) 96   


 


10/2/20 08:30  46 25 106/64 (78) 95   


 


10/2/20 08:00  62      


 


10/2/20 08:00        60


 


10/2/20 08:00      Mechanical Ventilator  





      Mechanical Ventilator  





      Mechanical Ventilator  


 


10/2/20 08:00  43 24 114/59 (77) 95   


 


10/2/20 07:30  54 25 117/60 (79) 94   


 


10/2/20 07:10  95 24     55


 


10/2/20 07:00 98.8 46 25 131/71 (91) 95   


 


10/2/20 06:00  48 24 107/60 (76) 95   


 


10/2/20 05:00  54 24 108/63 (78) 95   


 


10/2/20 04:00  52      


 


10/2/20 04:00 98.7 73 27 124/101 (109) 96   


 


10/2/20 04:00        60


 


10/2/20 04:00      Mechanical Ventilator  





      Mechanical Ventilator  





      Mechanical Ventilator  


 


10/2/20 03:00  57 30 123/70 (87) 97   


 


10/2/20 02:45  70 24     55


 


10/2/20 02:00  67 24 109/64 (79) 96   


 


10/2/20 01:00  64 24 139/82 (101) 97   


 


10/2/20 00:00 98.3 60 24 132/79 (96) 97   


 


10/2/20 00:00        60


 


10/2/20 00:00      Mechanical Ventilator  





      Mechanical Ventilator  





      Mechanical Ventilator  


 


10/2/20 00:00  64      


 


10/1/20 23:00  61 24 102/59 (73) 96   


 


10/1/20 22:19  72 24     55


 


10/1/20 22:00  65 24 134/68 (90) 96   


 


10/1/20 21:00  64 24 118/69 (85) 96   


 


10/1/20 20:00  64      


 


10/1/20 20:00        60


 


10/1/20 20:00      Mechanical Ventilator  





      Mechanical Ventilator  





      Mechanical Ventilator  


 


10/1/20 20:00 98.7 62 24 126/104 (111) 96   


 


10/1/20 19:22  70 24     55


 


10/1/20 19:00  58 25 127/98 (108) 97   


 


10/1/20 18:00  46 27 92/57 (69) 95   

















Intake and Output  


 


 10/1/20 10/2/20





 19:00 07:00


 


Intake Total 800 ml 400 ml


 


Output Total 610 ml 555 ml


 


Balance 190 ml -155 ml


 


  


 


Free Water 100 ml 200 ml


 


IV Total 100 ml 


 


Tube Feeding 600 ml 200 ml


 


Output Urine Total 590 ml 545 ml


 


Chest Tube Drainage Total 20 ml 10 ml


 


# Bowel Movements 4 1











Laboratory Tests








Test


 10/1/20


18:28 10/1/20


20:05 10/1/20


23:34 10/2/20


03:25


 


POC Whole Blood Glucose


 183 MG/DL


()  H Pending  


 171 MG/DL


()  H 





 


Sodium Level


 


 


 


 147 MMOL/L


(136-145)  H


 


Potassium Level


 


 


 


 3.4 MMOL/L


(3.5-5.1)  L


 


Chloride Level


 


 


 


 113 MMOL/L


()  H


 


Carbon Dioxide Level


 


 


 


 26 MMOL/L


(21-32)


 


Anion Gap


 


 


 


 8 mmol/L


(5-15)


 


Blood Urea Nitrogen


 


 


 


 19 mg/dL


(7-18)  H


 


Creatinine


 


 


 


 0.7 MG/DL


(0.55-1.30)


 


Estimat Glomerular Filtration


Rate 


 


 


 > 60 mL/min


(>60)


 


Glucose Level


 


 


 


 180 MG/DL


()  H


 


Calcium Level


 


 


 


 7.3 MG/DL


(8.5-10.1)  L


 


Phosphorus Level


 


 


 


 2.1 MG/DL


(2.5-4.9)  L


 


Magnesium Level


 


 


 


 1.7 MG/DL


(1.8-2.4)  L


 


Total Bilirubin


 


 


 


 0.5 MG/DL


(0.2-1.0)


 


Aspartate Amino Transf


(AST/SGOT) 


 


 


 18 U/L (15-37)





 


Alanine Aminotransferase


(ALT/SGPT) 


 


 


 26 U/L (12-78)





 


Alkaline Phosphatase


 


 


 


 61 U/L


()


 


Total Protein


 


 


 


 4.8 G/DL


(6.4-8.2)  L


 


Albumin


 


 


 


 1.3 G/DL


(3.4-5.0)  L


 


Globulin    3.5 g/dL  


 


Albumin/Globulin Ratio


 


 


 


 0.4 (1.0-2.7)


L


 


Test


 10/2/20


05:33 10/2/20


12:03 


 





 


POC Whole Blood Glucose


 Pending  


 138 MG/DL


()  H 


 




















Constantine Jorgensen MD           Oct 2, 2020 17:41

## 2020-10-02 NOTE — NUR
NURSE NOTES:

INFORMED C.N THAT R.T NOTED DECREASED VOLUME FROM CUFF. PT REPOSITIONED, CUFF PRESSURE 
CHECKED AND ADJUSTED. WILL MONITOR CLOSELY. F/U CHEST CHEST XRAY.

## 2020-10-02 NOTE — INFECTIOUS DISEASES PROG NOTE
Assessment/Plan





ASSESSMENT:


sp code blue 8/26


Septic Shock; SP


Fever, recurrent- low grade 


Leukocytosis; persistent/fluctuating, SP


     -9/19 Bcx NTD


   -9/9 u/a no pyuria


   -9/8 Bcx NTD (Picc line)


   -9/6 Bcx NTD


            ucx Neg


             sp cx C. parapsilopsis


  -8/26 u/a no pyuria





Pneumonia.- COVID 19 neg x3


   Acute hypoxic resp failure on VM> NRB 15l 100%; hypoxic on ABG> now VDRF 

8/26- Fio2 80% >100% 8/28> 60% 9/1 >80% 9/2   >95% 9/10 >90% 9/14 >60% 9/15


R tension Pneumothroax sp CT 9/21


       -9/22 CXR: Interim complete reexpansion of right lung without evidence of

residual pneumothorax. Bilateral diffuse and extensive infiltrates. Markedly 

improved chest wall subcutaneous emphysema


       -9/21 CXR: Interim reexpansion of previously demonstrated right 

pneumothorax, status post large bore chest tube placement.


      -9/14 CXR: Improved aeration of both lungs.


       -9/5 CXR:Small bilateral pleural effusions with minor edema.  Stable 

edema with  mild worsening in the degree of pleural effusion on the right.


         -9/1 sp cx Neg


         8/31 CXR: Extensive bilateral interstitial and airspace disease appears

similar to the prior exam. Moderate to large bilateral pleural effusions appear 

unchanged.


   8/28 CXR: Increasing left upper lobe dense consolidation and likely 

increasing bilateral pleural fluid. Persistent diffuse dense consolidation el

sewhere


            -8/26 sp cx normal resp lilliam


             -8/25 CXR: Increased atelectasis of the right lung, since prior e

xam of 3 days earlier. New or increased right pleural effusion. Increased left 

basilar consolidation and/or pleural fluid 


          -COVID Rapid PCR neg 8/23, 8/23, 8/26


          -8/22 spc x Group G strep


          -8/22 CXR:   Reduced lung volumes.  Patchy bilateral predominantly 

interstitial  pulmonary opacities.  Could be from edema and/or pneumonia.  There

is a broader differential.


 


        -legionella ag urine, blasto ab, Histo ab, HIV ab screen, JOY, ANCA neg





Persistent, high grade bacteremia-


     -8/22 Bcx 4/4 sets S. haemolyticus; 8/23 Bcx 3/4 S/ epi; 8/27 Bcx 1.4 S. 

warnerri; 8/29 Bcx Neg


     -2d echo: no vegetaions seen





          ua/ wbc 10-15, nit neg, leuk +1; ucx Neg





DAVID; 


 -supratherapeutic vanco levels





-Seizure disorder.


- Hypothyroidism.


- Down syndrome.





History of PEG tube placement.


[facility]








PLAN:


Monitor off abx





          9/14 SP Meropenem #18, IV Amikacin #7


          9/4/20 SP Daptomycin #11


          9/2 SP MIcafungin #7, Linezolid #5


   8/28 SP Azithromycin #7/7


   8/26 SP Ceftriaxone #2


   8/25 SP IV Vancomycin #4, Zosyn #4


   8/22 SP Cefepime x1, Flagyl x1





- Monitor CBC, BMP.


.f/u cx


- COVID neg x3


- Monitor chest x-ray.


- Monitor the patient's clinical course and labs.  Based on those, we will do 

further recommendation.


-f/u Fungitell, asp ag, flow cytometry


-poor prognosis











Thank you, Dr. Cherry, for allowing me to participate in the care of


this patient.  I will follow the patient with you at this


hospitalization.








Discussed with RN





Subjective


Allergies:  


Coded Allergies:  


     No Known Allergies (Unverified , 1/8/19)





Afebrile


On Vent


No leukocytosis





Objective





Last 24 Hour Vital Signs








  Date Time  Temp Pulse Resp B/P (MAP) Pulse Ox O2 Delivery O2 Flow Rate FiO2


 


10/2/20 08:30  46 25 106/64 (78) 95   


 


10/2/20 08:00  62      


 


10/2/20 08:00        60


 


10/2/20 08:00      Mechanical Ventilator  





      Mechanical Ventilator  





      Mechanical Ventilator  


 


10/2/20 08:00  43 24 114/59 (77) 95   


 


10/2/20 07:30  54 25 117/60 (79) 94   


 


10/2/20 07:10  95 24     55


 


10/2/20 07:00 98.8 46 25 131/71 (91) 95   


 


10/2/20 06:00  48 24 107/60 (76) 95   


 


10/2/20 05:00  54 24 108/63 (78) 95   


 


10/2/20 04:00  52      


 


10/2/20 04:00 98.7 73 27 124/101 (109) 96   


 


10/2/20 04:00        60


 


10/2/20 04:00      Mechanical Ventilator  





      Mechanical Ventilator  





      Mechanical Ventilator  


 


10/2/20 03:00  57 30 123/70 (87) 97   


 


10/2/20 02:45  70 24     55


 


10/2/20 02:00  67 24 109/64 (79) 96   


 


10/2/20 01:00  64 24 139/82 (101) 97   


 


10/2/20 00:00 98.3 60 24 132/79 (96) 97   


 


10/2/20 00:00        60


 


10/2/20 00:00      Mechanical Ventilator  





      Mechanical Ventilator  





      Mechanical Ventilator  


 


10/2/20 00:00  64      


 


10/1/20 23:00  61 24 102/59 (73) 96   


 


10/1/20 22:19  72 24     55


 


10/1/20 22:00  65 24 134/68 (90) 96   


 


10/1/20 21:00  64 24 118/69 (85) 96   


 


10/1/20 20:00  64      


 


10/1/20 20:00        60


 


10/1/20 20:00      Mechanical Ventilator  





      Mechanical Ventilator  





      Mechanical Ventilator  


 


10/1/20 20:00 98.7 62 24 126/104 (111) 96   


 


10/1/20 19:22  70 24     55


 


10/1/20 19:00  58 25 127/98 (108) 97   


 


10/1/20 18:00  46 27 92/57 (69) 95   


 


10/1/20 17:00  46 27 112/71 (85) 95   


 


10/1/20 16:05 99.0 69 21 110/57 (74) 95   


 


10/1/20 16:00        60


 


10/1/20 16:00  69 21 110/57 (74) 95   


 


10/1/20 16:00      Mechanical Ventilator  





      Mechanical Ventilator  





      Mechanical Ventilator  


 


10/1/20 16:00  67      


 


10/1/20 15:23  64 24     60


 


10/1/20 15:00  62 20 112/53 (72) 95   


 


10/1/20 14:00  66 24 115/67 (83) 94   


 


10/1/20 13:00 98.9 48 24 114/50 (71) 94   


 


10/1/20 12:00  58 24 128/54 (78) 93   


 


10/1/20 12:00  58      


 


10/1/20 12:00      Mechanical Ventilator  





      Mechanical Ventilator  





      Mechanical Ventilator  


 


10/1/20 12:00        60








Height (Feet):  5


Height (Inches):  3.00


Weight (Pounds):  172


GEN: NAD, On Vent


HEENT: NCAT, Intubated, 


Pulm: Equal chest rise and fall B/L, No accessory muscle use


ABD: Soft, ND


SKIN: Exposed skin with no rash, Normal in color





Laboratory Tests








Test


 10/1/20


18:28 10/1/20


20:05 10/1/20


23:34 10/2/20


03:25


 


POC Whole Blood Glucose


 183 MG/DL


()  H Pending  


 171 MG/DL


()  H 





 


Sodium Level


 


 


 


 147 MMOL/L


(136-145)  H


 


Potassium Level


 


 


 


 3.4 MMOL/L


(3.5-5.1)  L


 


Chloride Level


 


 


 


 113 MMOL/L


()  H


 


Carbon Dioxide Level


 


 


 


 26 MMOL/L


(21-32)


 


Anion Gap


 


 


 


 8 mmol/L


(5-15)


 


Blood Urea Nitrogen


 


 


 


 19 mg/dL


(7-18)  H


 


Creatinine


 


 


 


 0.7 MG/DL


(0.55-1.30)


 


Estimat Glomerular Filtration


Rate 


 


 


 > 60 mL/min


(>60)


 


Glucose Level


 


 


 


 180 MG/DL


()  H


 


Calcium Level


 


 


 


 7.3 MG/DL


(8.5-10.1)  L


 


Phosphorus Level


 


 


 


 2.1 MG/DL


(2.5-4.9)  L


 


Magnesium Level


 


 


 


 1.7 MG/DL


(1.8-2.4)  L


 


Total Bilirubin


 


 


 


 0.5 MG/DL


(0.2-1.0)


 


Aspartate Amino Transf


(AST/SGOT) 


 


 


 18 U/L (15-37)





 


Alanine Aminotransferase


(ALT/SGPT) 


 


 


 26 U/L (12-78)





 


Alkaline Phosphatase


 


 


 


 61 U/L


()


 


Total Protein


 


 


 


 4.8 G/DL


(6.4-8.2)  L


 


Albumin


 


 


 


 1.3 G/DL


(3.4-5.0)  L


 


Globulin    3.5 g/dL  


 


Albumin/Globulin Ratio


 


 


 


 0.4 (1.0-2.7)


L


 


Test


 10/2/20


05:33 


 


 





 


POC Whole Blood Glucose Pending     











Current Medications








 Medications


  (Trade)  Dose


 Ordered  Sig/Didi


 Route


 PRN Reason  Start Time


 Stop Time Status Last Admin


Dose Admin


 


 Acetaminophen


  (Tylenol)  650 mg  Q4H  PRN


 GT


 For Pain  9/23/20 17:15


 10/23/20 17:14  10/1/20 14:56





 


 Chlorhexidine


 Gluconate


  (Beatrice-Hex 2%)  1 applic  DAILY@2000


 TOPIC


   8/31/20 20:00


 11/29/20 19:59  10/1/20 20:10





 


 Clotrimazole


  (Lotrimin)  1 applic  Q12HR


 TOPIC


   8/30/20 13:00


 11/28/20 12:59  10/2/20 09:07





 


 Dextrose


  (Dextrose 50%)  25 ml  Q30M  PRN


 IV


 Hypoglycemia  8/26/20 11:30


 11/20/20 11:29   





 


 Dextrose


  (Dextrose 50%)  50 ml  Q30M  PRN


 IV


 Hypoglycemia  8/26/20 11:30


 11/20/20 11:29   





 


 Hydrocortisone


  (Solu-CORTEF)  50 mg  EVERY 8  HOURS


 IV


   9/28/20 06:00


 12/27/20 05:59  10/2/20 05:41





 


 Insulin Aspart


  (NovoLOG)    Q6HR


 SUBQ


   9/18/20 12:00


 12/17/20 11:59  10/2/20 05:43





 


 Insulin Detemir


  (Levemir)  10 units  Q12HR


 SUBQ


   9/18/20 10:00


 12/17/20 09:59  10/2/20 09:09





 


 Levothyroxine


 Sodium


  (Synthroid)  75 mcg  DAILY@0630


 GT


   9/30/20 06:30


 10/30/20 06:29  10/2/20 05:41





 


 Loperamide HCl


  (Imodium)  2 mg  Q6H  PRN


 NG


 Diarrhea  9/16/20 10:30


 10/16/20 10:29  9/23/20 11:41





 


 Magnesium Sulfate  100 ml @ 


 100 mls/hr  Q1H


 IVPB


   10/2/20 09:30


 10/2/20 13:29  10/2/20 11:14





 


 Pantoprazole


  (Protonix)  40 mg  EVERY 12  HOURS


 IVP


   9/28/20 21:00


 10/28/20 20:59  10/2/20 09:06





 


 Potassium


 Phosphate  250 ml @ 


 62.5 mls/hr  Q4H


 IVPB


   10/2/20 11:00


 10/2/20 18:59  10/2/20 11:14





 


 Potassium Chloride


  (K-Dur)  40 meq  EVERY 12  HOURS


 GT


   9/26/20 21:00


 12/19/20 12:14  10/2/20 09:06




















Elfego Mcmahon MD             Oct 2, 2020 11:40

## 2020-10-02 NOTE — NUR
NURSE NOTES:

LATE ENTRY:

CALLED MD. CLIFTON TO F/U ON ORDER FOR EMERGENT CHEST TUBE INTUBATION. MD AWARE. PROCEDURE 
NEEDED FOR PT SAFETY/ OUTCOME.

## 2020-10-02 NOTE — PRE-PROCEDURE NOTE/ATTESTATION
Pre-Procedure Note/Attestation


Complete Prior to Procedure


Planned Procedure:  left


Procedure Narrative:


Chest vent





Indications for Procedure


Pre-Operative Diagnosis:


PTX





Attestation


I attest that I discussed the nature of the procedure; its benefits; risks and 

complications; and alternatives (and the risks and benefits of such 

alternatives), prior to the procedure, with the patient (or the patient's legal 

representative).





I attest that, if there was a reasonable possibility of needing a blood 

transfusion, the patient (or the patient's legal representative) was given the 

[facility] standardized written summary, pursuant 

to the [facility] Blood Safety Act ([place] Health and Safety Code # 1645, as 

amended).





I attest that I re-evaluated the patient just prior to the surgery and that 

there has been no change in the patient's H&P, except as documented below:





Emergency consent by Scott Chan MD                 Oct 2, 2020 17:02

## 2020-10-02 NOTE — NUR
NURSE NOTES:

LATE ENTRY:

MD. ACOSTA HERE TO INTUBATE PT. PT PREMEDICATED WITH 20MG/10ML ETOMIDATE, IVP.  ET TUBE 7.5  
23CM AT LIP. VENT SETTINGS: AC 24, , FI02 60%. BREATH SOUNDS CLEAR RT LUNG, 
DIMINISHED.  SECRETIONS THICK, PINK.  AWAITING POST INTUBATION CXR. 

-------------------------------------------------------------------------------

Addendum: 10/02/20 at 1955 by Annalee Rosenthal RN

-------------------------------------------------------------------------------

NURSE NOTES:

LATE ENTRY:

ET TUBE BEING CHANGED FOR CUFF LEAK NOTED. PT RR INCREASE, SATURATION FLUCTUATING AND NOT 
MAINTAINING VOLUMES, PT DROWSY.

## 2020-10-02 NOTE — DIAGNOSTIC IMAGING REPORT
Indication: Post intubation

 

Technique: One view of the chest

 

Comparison: 9/30/2020

 

Findings: Endotracheal tube projects approximately 2 cm above the lisandro, in

satisfactory position. There is a large left pneumothorax, estimated 70-80%. There is

questionably some rightward mediastinal shift. Right lung remains unchanged there are

left arm PICC again demonstrated.

 

Impression: Large left pneumothorax, possibly tension pneumothorax

 

Satisfactory endotracheal intubation

## 2020-10-02 NOTE — EMERGENCY ROOM REPORT
History of Present Illness


General


Chief Complaint:  Dyspnea/Respdistress


Source:  Medical Record, PMD





Present Illness


Allergies:  


Coded Allergies:  


     No Known Allergies (Unverified , 1/8/19)





COVID-19 Screening


Contact w/high risk pt:  No


Experienced COVID-19 symptoms?:  No


COVID-19 Testing performed PTA:  Yes


COVID-19 Screening:  Negative COVID-19


COVID-19 Testing Source:  08/10/20





Nursing Documentation-PMH


Past Medical History:  No History, Except For


Hx Cardiac Problems:  No - PVD


Hx Diabetes:  No - Hypothyroid


Hx Cancer:  No


Hx Gastrointestinal Problems:  No - gastrostomy


Hx Neurological Problems:  Yes - down syndrome


Hx Seizures:  Yes


Hx Speech Problem:  Yes





Physical Exam





Vital Signs








  Date Time  Temp Pulse Resp B/P (MAP) Pulse Ox O2 Delivery O2 Flow Rate FiO2


 


9/28/20 07:00  48 23 94/74 (81) 97   


 


9/28/20 07:33        70


 


9/28/20 08:00 96.5       


 


9/28/20 08:00      Mechanical Ventilator  





      Mechanical Ventilator  





      Mechanical Ventilator  











Procedures


Intubation


Intubation :  


   Consent:  Emergent


   Time of Intubation:  12:40


   Medications:  Etomidate


   Breath Sounds after Intubation:  equal


   Intubation Complications:  no complications


   Post Intubation Xray:  Yes


   Attempts:  One


   Patient Tolerated:  Well


   Complications:  None


Progress


.  Patient  intubated over bougie due to cuff leak.  Post procedure x-ray after 

2 hours of ventilation showed left-sided pneumothorax.  Patient had previous 

right-sided pneumothorax.  Radiology was consulted for management of 

pneumothorax with a Thora vent





Medical Decision Making


Diagnostic Impression:  


   Primary Impression:  


   ARDS (adult respiratory distress syndrome)


   Additional Impressions:  


   HCAP (healthcare-associated pneumonia)


   Septic shock


   Respiratory failure requiring intubation





Last Vital Signs








  Date Time  Temp Pulse Resp B/P (MAP) Pulse Ox O2 Delivery O2 Flow Rate FiO2


 


10/2/20 12:00 98.6 55 23 107/61 (76) 95   


 


10/2/20 12:00      Mechanical Ventilator  





      Mechanical Ventilator  





      Mechanical Ventilator  


 


10/2/20 12:00        60








Disposition:  ADMITTED AS INPATIENT


Condition:  Critical


Referrals:  


NON PHYSICIAN (PCP)











Sabas Hawk MD                Oct 2, 2020 12:51

## 2020-10-02 NOTE — GENERAL PROGRESS NOTE
Progress Note


Progress Note


Surgery:





Patient had cuff leak on ET tube.  decreased volumes and saturation.  ET tube 

was replaced since and new tube with functional cuff.  repeat CXR noted a large 

left pneumothorax.  I have personally reviewed the imaging and have reviewed it 

with the ED physician and Radiologist.  Attempt was made to contact the con

servator but not able to do so at this time.  Emergency tube thoracostomy 

indicated and recommended.  This is potentially life threatening and reversible.

 procedure required now and medically necessary emergency.  I have recommended 

for the radiologist who is kind enough to place a thoravent which would be in 

patients best interest and minimally invasive.  recommend proceeding with 

procedure emergently in patients best interest and safety.  thank you











Jake Aranda                 Oct 2, 2020 15:48

## 2020-10-02 NOTE — DIAGNOSTIC IMAGING REPORT
INDICATION: 

Pneumothorax

COMPARISON:

9/30/2020.  Same day study at 1635.

 

Findings/impression: 

 

Previously identified left apical pneumothorax is not identified.  No 

clinically significant pneumothorax visualized.

Tip of the endotracheal tube is 2.2 cm above the lisandro.  Unchanged 

positioning of the left upper extremity PICC line.

Left chest tube is present.

Diffuse bilateral airspace opacities, increased in the left lung compared 

to the prior study.

Small bilateral pleural effusions.

## 2020-10-02 NOTE — NUR
RD ASSESSMENT & RECOMMENDATIONS

SEE CARE ACTIVITY FOR COMPLETE ASSESSMENT



DAILY ESTIMATED NEEDS:

Needs based on CRITICAL CARE, 50kg  

22-28  kcals/kg 

8770-5844  total kcals

1.25-2  g protein/kg

  g total protein 

25-30  mL/kg

8262-1064  total fluid mLs



NUTRITION DIAGNOSIS:

Swallowing difficulty r/t dysphagia as evdienced by pt w/ Downs Syndrome,

PEG dep for all nutritional needs, now intubated, off pressor support

pending trach placement.



 

CURRENT TF:Vital AF 1.2 @ 50ml/hr x 22 hrs (On Synthroid QD)- held for procedure 



 





ENTERAL NUTRITION RECOMMENDATIONS:

VITAL AF 1.2 @ 50ml/hr x 22 hrs  to provide 1100ml, 1320 kcal, 83g prot, 892ml free wa ter 



* Maintain current TF: meets 100% est kcal/prot needs

* Hold 1 hr before and after Synthroid med

* HOB over 30 degrees/ water flush per MD



 



ADDITIONAL RECOMMENDATIONS:

1) Ht of 61 inches per SNF; recalibrate bed scale for accurate CBW  

2) Add NISS: BGs now in the 200's, on Solucortef-> NOW ON NISS + LEVEMIR 

3) Monitor hemodynamic stability: OFF PRESSOR SUPPORT 

4) Wound healing: add Vit C 250mg QD + continue Marcio BID 

5) Monitor lytes, replete as needed (low K, phos, mag) 

6) Probiotics for diarrhea  

.

## 2020-10-02 NOTE — DIAGNOSTIC IMAGING REPORT
Indication: Pneumothorax, status post tube placement for such

 

Technique: One view of the chest

 

Comparison: Two and one half hours earlier

 

Findings: Interim placement of a chest vent catheter in the left hemithorax in the

first intercostal space. This appears well-positioned. Interim partial reexpansion of

the left lung with the evacuation of most of the left pneumothorax. Small upper lung

pneumothorax persists, and there may also be a basilar component of pneumothorax.

Bilateral parenchymal infiltrates versus edema persists. Stable satisfactory

positions of endotracheal tube and

 

Impression:  Markedly improved although persistent left pneumothorax, status post

chest vent catheter placement. Recommend follow-up radiographs

## 2020-10-02 NOTE — NUR
NURSE NOTES:

Received patient from Annalee TRAYLOR.  Patient is observed to be awake and alert.  Patient 
opens eyes spontaneously, tracks with her eyes and does respond to stimuli.  Patient is 
developmentally disabled, it is difficult to determine her ability to follow commands.  
Patient does not appear in any acute distress however, when inspected the site of the pleura 
vac, patient did grimace with pain otherwise, patient remains without acute distress.  
Patient is with a L thoravent as well that illicits the same response upon assessing the 
site.  Patient is being monitored on the cardiac monitor with VS HR 67 /67 SPO2 98% RR 
27. Patient is orally intubated with ETT 7.5, with vent settings AC 24,  FIO2 50% no 
PEEP.  Oral care was performed.  Patient with GT that is currently clamped at the moment and 
NPO.  Patient is with a Valle catheter draining good amounts of cloudy urine to gravity.  
Patient has a CARLOS that is patient and asymptomatic that is TKO.  R  chest tube is draining 
serous drainage on the R and thoravent is on the L chest.  Safety measures are in place with 
bed locked in the lowest position, on a P200 mattress, seizure precautions and side rails up 
x 2.  Will continue to monitor and carry out MD plan of care.

## 2020-10-02 NOTE — NUR
NURSE HAND-OFF REPORT: 



Latest Vital Signs: Temperature 98.7 , Pulse 48 , B/P 107 /60 , Respiratory Rate 24 , O2 SAT 
95 , Mechanical Ventilator, O2 Flow Rate 15.0 .  

Vital Sign Comment: [STABLE]



EKG Rhythm: Sinus Bradycardia

Rhythm change?: N 

MD Notified?: Y -Dr Mehul MENDENHALL Response: No New Orders Received



Latest Momin Fall Score: 70  

Fall Risk: High Risk 

Safety Measures: Call light Within Reach, Bed Alarm Zone 1, Side Rails Side Rails x3, Bed 
position Low and Locked.

Fall Precautions: 

Door Sign



Report given to [JOLENE SPARKS RN].

## 2020-10-02 NOTE — SURGERY PROGRESS NOTE
Surgery Progress Note


Subjective


Additional Comments


consent for trach


currently et tube with leak.  would recommend that tube be exchanged 


will plan trach but in meantime tube change to ensure safe trach





Objective





Last 24 Hour Vital Signs








  Date Time  Temp Pulse Resp B/P (MAP) Pulse Ox O2 Delivery O2 Flow Rate FiO2


 


10/2/20 08:30  46 25 106/64 (78) 95   


 


10/2/20 08:00  62      


 


10/2/20 08:00        60


 


10/2/20 08:00      Mechanical Ventilator  





      Mechanical Ventilator  





      Mechanical Ventilator  


 


10/2/20 08:00  43 24 114/59 (77) 95   


 


10/2/20 07:30  54 25 117/60 (79) 94   


 


10/2/20 07:10  95 24     55


 


10/2/20 07:00 98.8 46 25 131/71 (91) 95   


 


10/2/20 06:00  48 24 107/60 (76) 95   


 


10/2/20 05:00  54 24 108/63 (78) 95   


 


10/2/20 04:00  52      


 


10/2/20 04:00 98.7 73 27 124/101 (109) 96   


 


10/2/20 04:00        60


 


10/2/20 04:00      Mechanical Ventilator  





      Mechanical Ventilator  





      Mechanical Ventilator  


 


10/2/20 03:00  57 30 123/70 (87) 97   


 


10/2/20 02:45  70 24     55


 


10/2/20 02:00  67 24 109/64 (79) 96   


 


10/2/20 01:00  64 24 139/82 (101) 97   


 


10/2/20 00:00 98.3 60 24 132/79 (96) 97   


 


10/2/20 00:00        60


 


10/2/20 00:00      Mechanical Ventilator  





      Mechanical Ventilator  





      Mechanical Ventilator  


 


10/2/20 00:00  64      


 


10/1/20 23:00  61 24 102/59 (73) 96   


 


10/1/20 22:19  72 24     55


 


10/1/20 22:00  65 24 134/68 (90) 96   


 


10/1/20 21:00  64 24 118/69 (85) 96   


 


10/1/20 20:00  64      


 


10/1/20 20:00        60


 


10/1/20 20:00      Mechanical Ventilator  





      Mechanical Ventilator  





      Mechanical Ventilator  


 


10/1/20 20:00 98.7 62 24 126/104 (111) 96   


 


10/1/20 19:22  70 24     55


 


10/1/20 19:00  58 25 127/98 (108) 97   


 


10/1/20 18:00  46 27 92/57 (69) 95   


 


10/1/20 17:00  46 27 112/71 (85) 95   


 


10/1/20 16:05 99.0 69 21 110/57 (74) 95   


 


10/1/20 16:00        60


 


10/1/20 16:00  69 21 110/57 (74) 95   


 


10/1/20 16:00      Mechanical Ventilator  





      Mechanical Ventilator  





      Mechanical Ventilator  


 


10/1/20 16:00  67      


 


10/1/20 15:23  64 24     60


 


10/1/20 15:00  62 20 112/53 (72) 95   


 


10/1/20 14:00  66 24 115/67 (83) 94   


 


10/1/20 13:00 98.9 48 24 114/50 (71) 94   


 


10/1/20 12:00  58 24 128/54 (78) 93   


 


10/1/20 12:00  58      


 


10/1/20 12:00      Mechanical Ventilator  





      Mechanical Ventilator  





      Mechanical Ventilator  


 


10/1/20 12:00        60








I&O











Intake and Output  


 


 10/1/20 10/2/20





 19:00 07:00


 


Intake Total 800 ml 400 ml


 


Output Total 610 ml 555 ml


 


Balance 190 ml -155 ml


 


  


 


Free Water 100 ml 200 ml


 


IV Total 100 ml 


 


Tube Feeding 600 ml 200 ml


 


Output Urine Total 590 ml 545 ml


 


Chest Tube Drainage Total 20 ml 10 ml


 


# Bowel Movements 4 1








Dressing:  other


Wound:  other


Cardiovascular:  RSR


Respiratory:  decreased breath sounds


Abdomen:  soft, non-tender, present bowel sounds


Extremities:  no tenderness, no cyanosis





Laboratory Tests








Test


 10/1/20


18:28 10/1/20


20:05 10/1/20


23:34 10/2/20


03:25


 


POC Whole Blood Glucose


 183 MG/DL


()  H Pending  


 171 MG/DL


()  H 





 


Sodium Level


 


 


 


 147 MMOL/L


(136-145)  H


 


Potassium Level


 


 


 


 3.4 MMOL/L


(3.5-5.1)  L


 


Chloride Level


 


 


 


 113 MMOL/L


()  H


 


Carbon Dioxide Level


 


 


 


 26 MMOL/L


(21-32)


 


Anion Gap


 


 


 


 8 mmol/L


(5-15)


 


Blood Urea Nitrogen


 


 


 


 19 mg/dL


(7-18)  H


 


Creatinine


 


 


 


 0.7 MG/DL


(0.55-1.30)


 


Estimat Glomerular Filtration


Rate 


 


 


 > 60 mL/min


(>60)


 


Glucose Level


 


 


 


 180 MG/DL


()  H


 


Calcium Level


 


 


 


 7.3 MG/DL


(8.5-10.1)  L


 


Phosphorus Level


 


 


 


 2.1 MG/DL


(2.5-4.9)  L


 


Magnesium Level


 


 


 


 1.7 MG/DL


(1.8-2.4)  L


 


Total Bilirubin


 


 


 


 0.5 MG/DL


(0.2-1.0)


 


Aspartate Amino Transf


(AST/SGOT) 


 


 


 18 U/L (15-37)





 


Alanine Aminotransferase


(ALT/SGPT) 


 


 


 26 U/L (12-78)





 


Alkaline Phosphatase


 


 


 


 61 U/L


()


 


Total Protein


 


 


 


 4.8 G/DL


(6.4-8.2)  L


 


Albumin


 


 


 


 1.3 G/DL


(3.4-5.0)  L


 


Globulin    3.5 g/dL  


 


Albumin/Globulin Ratio


 


 


 


 0.4 (1.0-2.7)


L


 


Test


 10/2/20


05:33 


 


 





 


POC Whole Blood Glucose Pending     











Plan


Problems:  


(1) Respiratory distress


Assessment & Plan:  Respiratory insufficiency requiring prolonged ventilator 

support.  Patient on minimal vent settings right now currently in stable but 

unfortunately not safe for extubation.  Tracheostomy is indicated recommended.  

I discussed case with pulmonology team ICU team medical teams.  Patient unable 

to make decisions and her current condition and given her history.  The care 

team has been contacted and will be reviewed for evaluation and consideration of

the tracheostomy.  If consented I think it is reasonable for tracheostomy given 

patient's current CODE STATUS care plan and goals of care.  Extubation his curr

ent status is potentially high risk for reintubation emergency complication.  We

will plan for tracheostomy if consent is obtained.  Thank you for let me 

participate patient's care will follow with recommendations





(2) Encephalopathy chronic


(3) Dysphagia


(4) Down's syndrome


(5) Sepsis


(6) Acute respiratory failure


(7) Pneumonia


(8) Trisomy 21, Down syndrome


(9) DAVID (acute kidney injury)


(10) Bacteremia


(11) Hypokalemia


(12) Anemia


(13) Diarrhea


(14) Hypernatremia


(15) Pneumothorax


(16) Pneumothorax, right


(17) Cardiac arrest


(18) ARDS (adult respiratory distress syndrome)


(19) Septic shock


(20) HCAP (healthcare-associated pneumonia)


(21) Respiratory failure requiring intubation


(22) Seizure disorder


(23) Hypothyroidism











Jake Aranda                 Oct 2, 2020 11:51

## 2020-10-02 NOTE — PULMONOLGY CRITICAL CARE NOTE
Critical Care - Asmt/Plan


Problems:  


(1) Acute respiratory failure


(2) Pneumothorax


(3) Bacteremia


(4) Pneumonia


(5) Sepsis


(6) HCAP (healthcare-associated pneumonia)


(7) Seizure disorder


(8) Down's syndrome


(9) Trisomy 21, Down syndrome


Respiratory:  monitor respiratory rate, adjust FIO2, CXR


Cardiac:  continue pressors, continue to monitor HR/BP


Renal:  F/U I&O, keep IV fluid, check electrolytes


Infectious Disease:  check cultures, continue antibiotics


Gastrointestinal:  continue feedings/current rate


Endocrine:  continue sliding scale insulin


Hematologic:  monitor H/H, transfuse if hgb<8.5


Neurologic:  PRN Ativan, keep patient comfortable


Prophylaxis:  Protonix


Disposition:  keep in ICU


Notes Reviewed:  internist, renal


Discussed with:  nurses, consultants, 





Critical Care - Objective





Last 24 Hour Vital Signs








  Date Time  Temp Pulse Resp B/P (MAP) Pulse Ox O2 Delivery O2 Flow Rate FiO2


 


10/2/20 08:30  46 25 106/64 (78) 95   


 


10/2/20 08:00  62      


 


10/2/20 08:00        60


 


10/2/20 08:00      Mechanical Ventilator  





      Mechanical Ventilator  





      Mechanical Ventilator  


 


10/2/20 08:00  43 24 114/59 (77) 95   


 


10/2/20 07:30  54 25 117/60 (79) 94   


 


10/2/20 07:10  95 24     55


 


10/2/20 07:00 98.8 46 25 131/71 (91) 95   


 


10/2/20 06:00  48 24 107/60 (76) 95   


 


10/2/20 05:00  54 24 108/63 (78) 95   


 


10/2/20 04:00  52      


 


10/2/20 04:00 98.7 73 27 124/101 (109) 96   


 


10/2/20 04:00        60


 


10/2/20 04:00      Mechanical Ventilator  





      Mechanical Ventilator  





      Mechanical Ventilator  


 


10/2/20 03:00  57 30 123/70 (87) 97   


 


10/2/20 02:45  70 24     55


 


10/2/20 02:00  67 24 109/64 (79) 96   


 


10/2/20 01:00  64 24 139/82 (101) 97   


 


10/2/20 00:00 98.3 60 24 132/79 (96) 97   


 


10/2/20 00:00        60


 


10/2/20 00:00      Mechanical Ventilator  





      Mechanical Ventilator  





      Mechanical Ventilator  


 


10/2/20 00:00  64      


 


10/1/20 23:00  61 24 102/59 (73) 96   


 


10/1/20 22:19  72 24     55


 


10/1/20 22:00  65 24 134/68 (90) 96   


 


10/1/20 21:00  64 24 118/69 (85) 96   


 


10/1/20 20:00  64      


 


10/1/20 20:00        60


 


10/1/20 20:00      Mechanical Ventilator  





      Mechanical Ventilator  





      Mechanical Ventilator  


 


10/1/20 20:00 98.7 62 24 126/104 (111) 96   


 


10/1/20 19:22  70 24     55


 


10/1/20 19:00  58 25 127/98 (108) 97   


 


10/1/20 18:00  46 27 92/57 (69) 95   


 


10/1/20 17:00  46 27 112/71 (85) 95   


 


10/1/20 16:05 99.0 69 21 110/57 (74) 95   


 


10/1/20 16:00        60


 


10/1/20 16:00  69 21 110/57 (74) 95   


 


10/1/20 16:00      Mechanical Ventilator  





      Mechanical Ventilator  





      Mechanical Ventilator  


 


10/1/20 16:00  67      


 


10/1/20 15:23  64 24     60


 


10/1/20 15:00  62 20 112/53 (72) 95   


 


10/1/20 14:00  66 24 115/67 (83) 94   


 


10/1/20 13:00 98.9 48 24 114/50 (71) 94   


 


10/1/20 12:00  58 24 128/54 (78) 93   


 


10/1/20 12:00  58      


 


10/1/20 12:00      Mechanical Ventilator  





      Mechanical Ventilator  





      Mechanical Ventilator  


 


10/1/20 12:00        60


 


10/1/20 11:30  62 24     60


 


10/1/20 11:00  57 22 122/72 (89) 96   








Status:  sedated


Condition:  critical


HEENT:  atraumatic, normocephalic


Neck:  full ROM


Lungs:  chest wall tender


Heart:  HR/BP stable


Abdomen:  soft, non-tender


Extremities:  no C/C/E


Accucheck:  180





Critical Care - Subjective


ROS Limited/Unobtainable:  Yes


Condition:  critical


EKG Rhythm:  Sinus Rhythm


FI02:  60


Vent Support Breath Rate:  24


Vent Support Mode:  AC


Vent Tidal Volume:  500


Sputum Amount:  Small


PEEP:  0.0


PIP:  34


Tube Feeding Amount:  50


I&O:











Intake and Output  


 


 10/1/20 10/2/20





 19:00 07:00


 


Intake Total 800 ml 400 ml


 


Output Total 610 ml 555 ml


 


Balance 190 ml -155 ml


 


  


 


Free Water 100 ml 200 ml


 


IV Total 100 ml 


 


Tube Feeding 600 ml 200 ml


 


Output Urine Total 590 ml 545 ml


 


Chest Tube Drainage Total 20 ml 10 ml


 


# Bowel Movements 4 1








CXR:


extensive infiltrate unchanged


ET-Tube:  7.5


ET Position:  22


Labs:





Laboratory Tests








Test


 10/1/20


18:28 10/1/20


20:05 10/1/20


23:34 10/2/20


03:25


 


POC Whole Blood Glucose


 183 MG/DL


()  H Pending  


 171 MG/DL


()  H 





 


Sodium Level


 


 


 


 147 MMOL/L


(136-145)  H


 


Potassium Level


 


 


 


 3.4 MMOL/L


(3.5-5.1)  L


 


Chloride Level


 


 


 


 113 MMOL/L


()  H


 


Carbon Dioxide Level


 


 


 


 26 MMOL/L


(21-32)


 


Anion Gap


 


 


 


 8 mmol/L


(5-15)


 


Blood Urea Nitrogen


 


 


 


 19 mg/dL


(7-18)  H


 


Creatinine


 


 


 


 0.7 MG/DL


(0.55-1.30)


 


Estimat Glomerular Filtration


Rate 


 


 


 > 60 mL/min


(>60)


 


Glucose Level


 


 


 


 180 MG/DL


()  H


 


Calcium Level


 


 


 


 7.3 MG/DL


(8.5-10.1)  L


 


Phosphorus Level


 


 


 


 2.1 MG/DL


(2.5-4.9)  L


 


Magnesium Level


 


 


 


 1.7 MG/DL


(1.8-2.4)  L


 


Total Bilirubin


 


 


 


 0.5 MG/DL


(0.2-1.0)


 


Aspartate Amino Transf


(AST/SGOT) 


 


 


 18 U/L (15-37)





 


Alanine Aminotransferase


(ALT/SGPT) 


 


 


 26 U/L (12-78)





 


Alkaline Phosphatase


 


 


 


 61 U/L


()


 


Total Protein


 


 


 


 4.8 G/DL


(6.4-8.2)  L


 


Albumin


 


 


 


 1.3 G/DL


(3.4-5.0)  L


 


Globulin    3.5 g/dL  


 


Albumin/Globulin Ratio


 


 


 


 0.4 (1.0-2.7)


L


 


Test


 10/2/20


05:33 


 


 





 


POC Whole Blood Glucose Pending     

















Chano Cherry MD               Oct 2, 2020 10:41

## 2020-10-02 NOTE — NEPHROLOGY PROGRESS NOTE
Assessment/Plan


Problem List:  


(1) DAVID (acute kidney injury)


(2) Respiratory failure requiring intubation


(3) Down's syndrome


(4) Seizure disorder


(5) Hypothyroidism


Assessment





Acute renal failure, likely due to hypotension


Acute respiratory distress, hypoxia


Seizure disorder


Hypothyroidism


Down syndrome


Full code











Fluid challenge with IV fluids and albumin


Midodrine for BP above 100 systolic


Check TSH level


Check


Correct level


Monitor renal parameters


Urine studies


Per orders


Plan


October 2: Remains intubated on ventilator.  Electrolyte abnormalities ad

dressed.  Supplements ordered.


October 1: Intubated on ventilator.  Labs reviewed.  Potassium supplement given.

 Remains bradycardic.  Continue per consultants.


September 30: Labs reviewed.  Electrolyte abnormalities addressed.  Heart rate 

remains bradycardic.  Continue per cardiology.  Continue to monitor renal 

parameters and electrolytes.


September 29: Labs reviewed.  Electrolyte abnormalities addressed and replaced. 

Medication list reviewed.  Heart rate remains mid 50s.  Continue as is.


September 28: On ventilator.  Full code.  Heart rate low.  Will discontinue 

Midodrin.  Continue to rest.  Electrolytes within normal limit.  Discussed with 

RN.


September 27: Remains intubated.  Full code.  No plan for tracheostomy yet.  

Labs reviewed.  All acceptable.  Continue same management


September 26: Labs reviewed.  Discussed with RN.  Potassium and phosphorus and 

magnesium replacement ordered.  Hemoglobin 9.5.  Patient remains full code.  

Continue per consultants.


September 25: Lab reviewed.  Renal parameters stable.  Full code.  Intubated.  

Electrolyte abnormalities addressed and replacement done.


September 24: Lab reviewed.  Renal parameters stable.  Full code.  Intubated.  

Has right side chest tube.  Continue per consultants.


September 23: Lab reviewed.  Renal parameters stable.  Full code.  Has right 

chest tube.  Planning process for tracheostomy.  Defer to chest and general 

surgeon.


September 22: Labs reviewed.  Renal parameters stable.  Remains full code.  

Remains on ventilator.  Due for tracheostomy tomorrow.  Continue per 

consultants.  Patient has a right chest tube in place at this time.


September 21: Labs reviewed.  Electrolyte imbalances addressed and supplemented.

 Remains full code and on ventilator.  Continue to monitor renal parameters.  

Continue per consultants.


September 20: Labs reviewed.  Serum potassium again low today.  Potassium 

supplement IV and through GT given.  Patient remains full code and is on 

ventilator.  Continue per consultants.  Continue to monitor electrolytes and 

renal parameters.


September 19: Labs reviewed.  Abnormal electrolyte addressed.  Remains full 

code.  Remains vented.


September 18: Day 27 of hospitalization.  Full code.  Labs reviewed.  Hemoglobin

down to 7.5.  Electrolyte abnormalities addressed and corrections ordered.  Con

tinue to monitor renal parameters.  Continue per consultants.  Start on Levemir 

for blood sugar management.  Questioning continuation of hydrocortisone?


September 17: Labs reviewed.  Potassium, phosphorus, hemoglobin, are all low.  

Potassium and phosphorus IV replacement given.  Continue to monitor electrolytes

and CBC.  Patient remains full code.


September 16: Lab reviewed.  Low phosphorus low magnesium and low potassium was 

addressed.  Hemoglobin drifting lower.  Continue per consultants.  Patient 

remains full code.


September 15: Labs reviewed.  Patient continues to be on ventilator.  D5W for 

high sodium and also potassium chloride intravenously as supplement given.  

Hemoglobin 8.4.  Continue to monitor electrolytes and renal parameters.


September 14: Labs reviewed.  Low potassium and high sodium noted.  Hemoglobin 

8.1 stable.  Aim to correct abnormal electrolyte.  Continue rest.  Will give 2 

boluses of D5W 500 cc.


September 13: Lab reviewed.  Abnormal electrolytes noted and addressed.


September 12: Labs reviewed.  Potassium supplement given.  Patient remains full 

code.  Continue per consultants.


September 11: Lab reviewed.  Electrolyte abnormalities addressed.  Continue per 

pulmonary and ID.


September 10: Lab reviewed.  Status unchanged.  Serum sodium 151 unchanged.  

Stable from renal standpoint of view.


September 9: Labs reviewed.  Status quo.  D5W 500 cc IV ordered.  Continue to m

onitor renal parameters.


September 8: Status quo.  Labs reviewed.  Overall condition unchanged.  Patient 

was transfused and hemoglobin higher.  Continue current management.  Patient 

remains full code.


September 7: Status quo.  Overall condition poor.  Very low albumin.  Edematous.

 Hypotensive.  Hemoglobin lower.  Anemia work-up ordered.  I favor transfusion 2

units of packed RBCs.  Patient remains full code.  I favor supportive care only.

 Will discuss.


September 6: Electrolyte abnormalities addressed.  Serum creatinine lower.  

Continue per current management.


September 5: Status unchanged.  Lab reviewed.  Serum potassium 2.7.  IV 

potassium chloride ordered.  Serum creatinine low at 1.6 stable.  Blood pressure

90s systolic


September 4: Status quo.  Labs reviewed.  Renal parameters stable.  Serum 

creatinine down to 1.6.  Medication list reviewed.  Continues to be on 

midodrine.  Continue per consultants.


September 3: Status quo.  Labs reviewed.  Electrolytes adjusted.  Serum 

creatinine down to 1.8.  Continue per consultants.


September 2: Status quo.  Labs reviewed.  Phosphorus supplement IV given.  Serum

creatinine 2.  Continue per consultants.


September 1: Requires less pressors.  Albumin bolus given.  1 dose of Lasix IV 

ordered as the patient severely edematous.  Patient serum albumin is very low.  

Continue per consultants.


August 31: Continues to be intubated.  Labs reviewed.  Serum creatinine 1.9 

unchanged.  Blood pressure more stable.  Off 1 of the pressors.  Continue to 

monitor renal parameters.  Continue per consultants.  Patient now on 

hydrocortisone 100 mg every 8 hours.  Will decrease IV fluid.  Normal saline 

down to 50 cc an hour.


August 30: Intubated.  Labs reviewed.  Creatinine 1.9 unchanged.  Continue same 

treatment plan.  Per consultants.  Overall poor prognosis since the patient 

remains on pressors and her pulmonary status is worsening.


August 29: Remains intubated.  Labs reviewed.  Creatinine 1.9.  Blood pressure 

systolic 90s.  Continue per consultants.


August 28: Remains intubated.  Labs reviewed.  Serum creatinine lower to 2.  

Vancomycin level lower.  Remains hypotensive on pressors.  Will increase 

midodrine to 10 mg every 8 hours.  Continue per consultants.  Continue to 

monitor renal parameters.


August 27: Patient now in ICU.  Intubated.  On pressors.  Labs reviewed.  Will 

increase midodrine.  Aim to keep blood pressure over 100 systolic.  Will give 

albumin bolus.  Will check vancomycin level which was elevated when checked 

previously on August 24.  Will monitor renal parameters.  Continue per co

nsultants.





Subjective


ROS Limited/Unobtainable:  Yes





Objective


Objective





Last 24 Hour Vital Signs








  Date Time  Temp Pulse Resp B/P (MAP) Pulse Ox O2 Delivery O2 Flow Rate FiO2


 


10/2/20 13:00  67 28 99/69 (79) 94   


 


10/2/20 12:40  74 28     55


 


10/2/20 12:00 98.6 55 23 107/61 (76) 95   


 


10/2/20 12:00  68      


 


10/2/20 12:00      Mechanical Ventilator  





      Mechanical Ventilator  





      Mechanical Ventilator  


 


10/2/20 12:00        60


 


10/2/20 11:05  56 27     55


 


10/2/20 11:00  53 23 106/58 (74) 94   


 


10/2/20 10:00  57 34 121/65 (83) 100   


 


10/2/20 09:20  58 25 129/87 (101) 93   


 


10/2/20 09:00  54 25 146/100 (115) 96   


 


10/2/20 08:30  46 25 106/64 (78) 95   


 


10/2/20 08:00  62      


 


10/2/20 08:00        60


 


10/2/20 08:00      Mechanical Ventilator  





      Mechanical Ventilator  





      Mechanical Ventilator  


 


10/2/20 08:00  43 24 114/59 (77) 95   


 


10/2/20 07:30  54 25 117/60 (79) 94   


 


10/2/20 07:10  95 24     55


 


10/2/20 07:00 98.8 46 25 131/71 (91) 95   


 


10/2/20 06:00  48 24 107/60 (76) 95   


 


10/2/20 05:00  54 24 108/63 (78) 95   


 


10/2/20 04:00  52      


 


10/2/20 04:00 98.7 73 27 124/101 (109) 96   


 


10/2/20 04:00        60


 


10/2/20 04:00      Mechanical Ventilator  





      Mechanical Ventilator  





      Mechanical Ventilator  


 


10/2/20 03:00  57 30 123/70 (87) 97   


 


10/2/20 02:45  70 24     55


 


10/2/20 02:00  67 24 109/64 (79) 96   


 


10/2/20 01:00  64 24 139/82 (101) 97   


 


10/2/20 00:00 98.3 60 24 132/79 (96) 97   


 


10/2/20 00:00        60


 


10/2/20 00:00      Mechanical Ventilator  





      Mechanical Ventilator  





      Mechanical Ventilator  


 


10/2/20 00:00  64      


 


10/1/20 23:00  61 24 102/59 (73) 96   


 


10/1/20 22:19  72 24     55


 


10/1/20 22:00  65 24 134/68 (90) 96   


 


10/1/20 21:00  64 24 118/69 (85) 96   


 


10/1/20 20:00  64      


 


10/1/20 20:00        60


 


10/1/20 20:00      Mechanical Ventilator  





      Mechanical Ventilator  





      Mechanical Ventilator  


 


10/1/20 20:00 98.7 62 24 126/104 (111) 96   


 


10/1/20 19:22  70 24     55


 


10/1/20 19:00  58 25 127/98 (108) 97   


 


10/1/20 18:00  46 27 92/57 (69) 95   


 


10/1/20 17:00  46 27 112/71 (85) 95   


 


10/1/20 16:05 99.0 69 21 110/57 (74) 95   


 


10/1/20 16:00        60


 


10/1/20 16:00  69 21 110/57 (74) 95   


 


10/1/20 16:00      Mechanical Ventilator  





      Mechanical Ventilator  





      Mechanical Ventilator  


 


10/1/20 16:00  67      


 


10/1/20 15:23  64 24     60


 


10/1/20 15:00  62 20 112/53 (72) 95   


 


10/1/20 14:00  66 24 115/67 (83) 94   

















Intake and Output  


 


 10/1/20 10/2/20





 19:00 07:00


 


Intake Total 800 ml 400 ml


 


Output Total 610 ml 555 ml


 


Balance 190 ml -155 ml


 


  


 


Free Water 100 ml 200 ml


 


IV Total 100 ml 


 


Tube Feeding 600 ml 200 ml


 


Output Urine Total 590 ml 545 ml


 


Chest Tube Drainage Total 20 ml 10 ml


 


# Bowel Movements 4 1








Laboratory Tests


10/1/20 18:28: POC Whole Blood Glucose 183H


10/1/20 20:05: POC Whole Blood Glucose [Pending]


10/1/20 23:34: POC Whole Blood Glucose 171H


10/2/20 03:25: 


Sodium Level 147H, Potassium Level 3.4L, Chloride Level 113H, Carbon Dioxide 

Level 26, Anion Gap 8, Blood Urea Nitrogen 19H, Creatinine 0.7, Estimat 

Glomerular Filtration Rate > 60, Glucose Level 180H, Calcium Level 7.3L, 

Phosphorus Level 2.1L, Magnesium Level 1.7L, Total Bilirubin 0.5, Aspartate 

Amino Transf (AST/SGOT) 18, Alanine Aminotransferase (ALT/SGPT) 26, Alkaline 

Phosphatase 61, Total Protein 4.8L, Albumin 1.3L, Globulin 3.5, Albumin/Globulin

 Ratio 0.4L


10/2/20 05:33: POC Whole Blood Glucose [Pending]


10/2/20 12:03: POC Whole Blood Glucose 138H


Height (Feet):  5


Height (Inches):  3.00


Weight (Pounds):  172


General Appearance:  no apparent distress


EENT:  other - Intubated on ventilator


Cardiovascular:  normal rate


Respiratory/Chest:  decreased breath sounds


Abdomen:  distended











Lam Nino MD             Oct 2, 2020 14:00

## 2020-10-02 NOTE — BRIEF OPERATIVE NOTE
Immediate Post Operative Note


Operative Note


Pre-op Diagnosis:


PTX


Procedure:


L chest vent


Post-op Diagnosis:  same as pre-op


Surgeon:  МАРИНА Cruz


Anesthesia:  local


Specimen:  none


Complications:  none


Fluids:  none


Drains:  other - thoravent


Implant(s) used?:  No











Scott Cruz MD                 Oct 2, 2020 17:03

## 2020-10-02 NOTE — INTERNAL MED PROGRESS NOTE
Subjective


Physician Name


Tyson Hung


Attending Physician


Chano Cherry MD





Current Medications








 Medications


  (Trade)  Dose


 Ordered  Sig/Didi


 Route


 PRN Reason  Start Time


 Stop Time Status Last Admin


Dose Admin


 


 Acetaminophen


  (Tylenol)  650 mg  Q4H  PRN


 GT


 For Pain  9/23/20 17:15


 10/23/20 17:14  10/1/20 14:56





 


 Chlorhexidine


 Gluconate


  (Beatrice-Hex 2%)  1 applic  DAILY@2000


 TOPIC


   8/31/20 20:00


 11/29/20 19:59  10/1/20 20:10





 


 Clotrimazole


  (Lotrimin)  1 applic  Q12HR


 TOPIC


   8/30/20 13:00


 11/28/20 12:59  10/2/20 09:07





 


 Dextrose


  (Dextrose 50%)  25 ml  Q30M  PRN


 IV


 Hypoglycemia  8/26/20 11:30


 11/20/20 11:29   





 


 Dextrose


  (Dextrose 50%)  50 ml  Q30M  PRN


 IV


 Hypoglycemia  8/26/20 11:30


 11/20/20 11:29   





 


 Hydrocortisone


  (Solu-CORTEF)  50 mg  EVERY 8  HOURS


 IV


   9/28/20 06:00


 12/27/20 05:59  10/2/20 13:38





 


 Insulin Aspart


  (NovoLOG)    Q6HR


 SUBQ


   9/18/20 12:00


 12/17/20 11:59  10/2/20 05:43





 


 Insulin Detemir


  (Levemir)  10 units  Q12HR


 SUBQ


   9/18/20 10:00


 12/17/20 09:59  10/2/20 09:09





 


 Levothyroxine


 Sodium


  (Synthroid)  75 mcg  DAILY@0630


 GT


   9/30/20 06:30


 10/30/20 06:29  10/2/20 05:41





 


 Lidocaine HCl


  (Xylocaine 1%


 30ml)  30 ml  ONCE  PRN


 INJ


 PROCEDURE  10/2/20 15:13


 10/2/20 23:59   





 


 Loperamide HCl


  (Imodium)  2 mg  Q6H  PRN


 NG


 Diarrhea  9/16/20 10:30


 10/16/20 10:29  9/23/20 11:41





 


 Pantoprazole


  (Protonix)  40 mg  EVERY 12  HOURS


 IVP


   9/28/20 21:00


 10/28/20 20:59  10/2/20 09:06





 


 Potassium


 Phosphate  250 ml @ 


 62.5 mls/hr  Q4H


 IVPB


   10/2/20 11:00


 10/2/20 18:59  10/2/20 15:27





 


 Potassium Chloride


  (K-Dur)  40 meq  EVERY 12  HOURS


 GT


   9/26/20 21:00


 12/19/20 12:14  10/2/20 09:06











Allergies:  


Coded Allergies:  


     No Known Allergies (Unverified , 1/8/19)


Subjective


in ICU, remained intubated on the vent, status post left chest tube placement, 

WBC 6.5, Hemoglobin 8.6,.





Objective





Last Vital Signs








  Date Time  Temp Pulse Resp B/P (MAP) Pulse Ox O2 Delivery O2 Flow Rate FiO2


 


10/2/20 15:00  78 19 94/74 (81) 97   


 


10/2/20 12:40        55


 


10/2/20 12:00 98.6       


 


10/2/20 12:00      Mechanical Ventilator  





      Mechanical Ventilator  





      Mechanical Ventilator  











Laboratory Tests








Test


 10/1/20


18:28 10/1/20


20:05 10/1/20


23:34 10/2/20


03:25


 


POC Whole Blood Glucose


 183 MG/DL


()  H Pending  


 171 MG/DL


()  H 





 


Sodium Level


 


 


 


 147 MMOL/L


(136-145)  H


 


Potassium Level


 


 


 


 3.4 MMOL/L


(3.5-5.1)  L


 


Chloride Level


 


 


 


 113 MMOL/L


()  H


 


Carbon Dioxide Level


 


 


 


 26 MMOL/L


(21-32)


 


Anion Gap


 


 


 


 8 mmol/L


(5-15)


 


Blood Urea Nitrogen


 


 


 


 19 mg/dL


(7-18)  H


 


Creatinine


 


 


 


 0.7 MG/DL


(0.55-1.30)


 


Estimat Glomerular Filtration


Rate 


 


 


 > 60 mL/min


(>60)


 


Glucose Level


 


 


 


 180 MG/DL


()  H


 


Calcium Level


 


 


 


 7.3 MG/DL


(8.5-10.1)  L


 


Phosphorus Level


 


 


 


 2.1 MG/DL


(2.5-4.9)  L


 


Magnesium Level


 


 


 


 1.7 MG/DL


(1.8-2.4)  L


 


Total Bilirubin


 


 


 


 0.5 MG/DL


(0.2-1.0)


 


Aspartate Amino Transf


(AST/SGOT) 


 


 


 18 U/L (15-37)





 


Alanine Aminotransferase


(ALT/SGPT) 


 


 


 26 U/L (12-78)





 


Alkaline Phosphatase


 


 


 


 61 U/L


()


 


Total Protein


 


 


 


 4.8 G/DL


(6.4-8.2)  L


 


Albumin


 


 


 


 1.3 G/DL


(3.4-5.0)  L


 


Globulin    3.5 g/dL  


 


Albumin/Globulin Ratio


 


 


 


 0.4 (1.0-2.7)


L


 


Test


 10/2/20


05:33 10/2/20


12:03 


 





 


POC Whole Blood Glucose


 Pending  


 138 MG/DL


()  H 


 




















Intake and Output  


 


 10/1/20 10/2/20





 19:00 07:00


 


Intake Total 800 ml 400 ml


 


Output Total 610 ml 555 ml


 


Balance 190 ml -155 ml


 


  


 


Free Water 100 ml 200 ml


 


IV Total 100 ml 


 


Tube Feeding 600 ml 200 ml


 


Output Urine Total 590 ml 545 ml


 


Chest Tube Drainage Total 20 ml 10 ml


 


# Bowel Movements 4 1








Objective


General: remain intubated, unable to follow command, open eyes with deep 

stimuli.


HEENT: NCAT, sclera anicteric, PERRL, ET Tube.


Neck: Supple, no significant jugular venous distention, 


Lungs: mechanical breath sounds decreased air at the bases,  no Wheeze, +coarse 

breath sound.


CHEST WALL: Bilateral  chest tubes.


Heart: Regular rate and rhythm, normal S1/S2, no murmurs/gallops


Abdomen: soft, not tender, not distended. + bowel sounds, Morbid Obesity, PEG 

site intact.


: Valle cath


Extremities: No Cyanosis , clubbing,  Bilateral upper extremities edema. left 

upper extremity PICC line.


Neuro: limited secondary to patient status, unable to follow command, bilateral 

upper and lower extremities contracted.





Assessment/Plan


Assessment/Plan


Problem List:  


(1) HCAP (healthcare-associated pneumonia)


(2) Sepsis


(3) Down's syndrome


(4) Dysphagia S/P PEG


(5) Seizure disorder


(6) Hypothyroidism


(7) Acute Hypoxemic respiratory failure


(8) Persistent, high grade bacteremia-  r/o endocarditis


(9) DAVID


(10) Bilateral  pneumothorax status post chest tube placement.





Plan: 


Tolerated tube feeding @ 50 cc/hr


Follow-up with laboratory and cultures


decrease Hydrocortisone 50 mg IV every 12


Continue to monitor off abx 


Poor prognosis











Tyson Hung MD                  Oct 2, 2020 16:39

## 2020-10-03 VITALS — SYSTOLIC BLOOD PRESSURE: 101 MMHG | DIASTOLIC BLOOD PRESSURE: 66 MMHG

## 2020-10-03 VITALS — SYSTOLIC BLOOD PRESSURE: 96 MMHG | DIASTOLIC BLOOD PRESSURE: 74 MMHG

## 2020-10-03 VITALS — DIASTOLIC BLOOD PRESSURE: 53 MMHG | SYSTOLIC BLOOD PRESSURE: 106 MMHG

## 2020-10-03 VITALS — SYSTOLIC BLOOD PRESSURE: 106 MMHG | DIASTOLIC BLOOD PRESSURE: 62 MMHG

## 2020-10-03 VITALS — DIASTOLIC BLOOD PRESSURE: 62 MMHG | SYSTOLIC BLOOD PRESSURE: 113 MMHG

## 2020-10-03 VITALS — DIASTOLIC BLOOD PRESSURE: 72 MMHG | SYSTOLIC BLOOD PRESSURE: 122 MMHG

## 2020-10-03 VITALS — DIASTOLIC BLOOD PRESSURE: 62 MMHG | SYSTOLIC BLOOD PRESSURE: 117 MMHG

## 2020-10-03 VITALS — DIASTOLIC BLOOD PRESSURE: 70 MMHG | SYSTOLIC BLOOD PRESSURE: 126 MMHG

## 2020-10-03 VITALS — DIASTOLIC BLOOD PRESSURE: 54 MMHG | SYSTOLIC BLOOD PRESSURE: 108 MMHG

## 2020-10-03 VITALS — SYSTOLIC BLOOD PRESSURE: 98 MMHG | DIASTOLIC BLOOD PRESSURE: 56 MMHG

## 2020-10-03 VITALS — DIASTOLIC BLOOD PRESSURE: 64 MMHG | SYSTOLIC BLOOD PRESSURE: 101 MMHG

## 2020-10-03 VITALS — SYSTOLIC BLOOD PRESSURE: 96 MMHG | DIASTOLIC BLOOD PRESSURE: 61 MMHG

## 2020-10-03 VITALS — DIASTOLIC BLOOD PRESSURE: 84 MMHG | SYSTOLIC BLOOD PRESSURE: 100 MMHG

## 2020-10-03 VITALS — SYSTOLIC BLOOD PRESSURE: 122 MMHG | DIASTOLIC BLOOD PRESSURE: 70 MMHG

## 2020-10-03 VITALS — SYSTOLIC BLOOD PRESSURE: 115 MMHG | DIASTOLIC BLOOD PRESSURE: 75 MMHG

## 2020-10-03 VITALS — DIASTOLIC BLOOD PRESSURE: 64 MMHG | SYSTOLIC BLOOD PRESSURE: 109 MMHG

## 2020-10-03 VITALS — DIASTOLIC BLOOD PRESSURE: 38 MMHG | SYSTOLIC BLOOD PRESSURE: 87 MMHG

## 2020-10-03 VITALS — SYSTOLIC BLOOD PRESSURE: 99 MMHG | DIASTOLIC BLOOD PRESSURE: 44 MMHG

## 2020-10-03 VITALS — SYSTOLIC BLOOD PRESSURE: 99 MMHG | DIASTOLIC BLOOD PRESSURE: 56 MMHG

## 2020-10-03 VITALS — SYSTOLIC BLOOD PRESSURE: 123 MMHG | DIASTOLIC BLOOD PRESSURE: 61 MMHG

## 2020-10-03 VITALS — SYSTOLIC BLOOD PRESSURE: 113 MMHG | DIASTOLIC BLOOD PRESSURE: 69 MMHG

## 2020-10-03 VITALS — DIASTOLIC BLOOD PRESSURE: 91 MMHG | SYSTOLIC BLOOD PRESSURE: 114 MMHG

## 2020-10-03 VITALS — SYSTOLIC BLOOD PRESSURE: 117 MMHG | DIASTOLIC BLOOD PRESSURE: 71 MMHG

## 2020-10-03 VITALS — SYSTOLIC BLOOD PRESSURE: 103 MMHG | DIASTOLIC BLOOD PRESSURE: 77 MMHG

## 2020-10-03 RX ADMIN — INSULIN ASPART SCH UNITS: 100 INJECTION, SOLUTION INTRAVENOUS; SUBCUTANEOUS at 12:00

## 2020-10-03 RX ADMIN — INSULIN ASPART SCH UNITS: 100 INJECTION, SOLUTION INTRAVENOUS; SUBCUTANEOUS at 05:23

## 2020-10-03 RX ADMIN — INSULIN ASPART SCH UNITS: 100 INJECTION, SOLUTION INTRAVENOUS; SUBCUTANEOUS at 00:00

## 2020-10-03 RX ADMIN — INSULIN ASPART SCH UNITS: 100 INJECTION, SOLUTION INTRAVENOUS; SUBCUTANEOUS at 18:00

## 2020-10-03 RX ADMIN — HYDROCORTISONE SODIUM SUCCINATE SCH MG: 100 INJECTION, POWDER, FOR SOLUTION INTRAMUSCULAR; INTRAVENOUS at 20:30

## 2020-10-03 RX ADMIN — PANTOPRAZOLE SODIUM SCH MG: 40 INJECTION, POWDER, FOR SOLUTION INTRAVENOUS at 20:30

## 2020-10-03 RX ADMIN — INSULIN ASPART SCH UNITS: 100 INJECTION, SOLUTION INTRAVENOUS; SUBCUTANEOUS at 23:37

## 2020-10-03 RX ADMIN — PANTOPRAZOLE SODIUM SCH MG: 40 INJECTION, POWDER, FOR SOLUTION INTRAVENOUS at 08:50

## 2020-10-03 RX ADMIN — HYDROCORTISONE SODIUM SUCCINATE SCH MG: 100 INJECTION, POWDER, FOR SOLUTION INTRAMUSCULAR; INTRAVENOUS at 08:50

## 2020-10-03 RX ADMIN — CHLORHEXIDINE GLUCONATE SCH APPLIC: 213 SOLUTION TOPICAL at 19:51

## 2020-10-03 RX ADMIN — INSULIN DETEMIR SCH UNITS: 100 INJECTION, SOLUTION SUBCUTANEOUS at 08:51

## 2020-10-03 RX ADMIN — INSULIN DETEMIR SCH UNITS: 100 INJECTION, SOLUTION SUBCUTANEOUS at 20:32

## 2020-10-03 NOTE — NUR
NURSE NOTES:



Pt turned and repositioned. Oral care done. , no insulin coverage given per sliding 
scale.

## 2020-10-03 NOTE — NUR
NURSE NOTES:



REPORT RECEIVED FROM LOUANN HAYDEN. PT DROWSY, AWAKENS TO NAME, TRACKS WITH HER EYES. SINUS 
BRADYCARDIA ON CARDIAC MONITOR, HR 55. PT ORALLY INTUBATED, ETT 7.5, 23CM AT THE LIP LINE. 
AC 24, , FI02 50%. RECEIVED REPORT THAT PT'S ETT BALLOON IS POPPED; WILL FOLLOW UP 
WITH RT AND MD. O2SAT 98% AT THIS TIME. G TUBE NOTED. TUBE FEEDING VITAL A.F 1.2, RUNNING AT 
50ML/HR. HOB >30, ASPIRATION PRECAUTIONS MAINTAINED. WESTBROOK IN PLACE DRAINING YELLOW URINE TO 
UROMETER. LEFT CHEST PLEURA VENT NOTED, TO SUCTION, WITH BUBBLING NOTED IN COLLECTION 
SYSTEM. ENDORSED THAT RIGHT CHEST TUBE WAS SLIGHTLY PULLED OUT AND IS NO LONGER IN THE 
PLEURAL SPACE, BUT INSTEAD IN THE SUBCUTANEOUS FAT. MD MADE AWARE AND AWAITING FURTHER 
INSTRUCTIONS. SKIN - SEE ASSESSMENT. TRACE EDEMA NOTED ON FEET. CONTACT ISOLATION 
MAINTAINED. PT RECEIVED AND MAINTAINED ON BL SOFT WRIST RESTRAINTS FOR SAFETY. BED LOCKED 
AND IN LOWEST POSITION. WILL CONTINUE TO MONITOR.

## 2020-10-03 NOTE — NUR
NURSE NOTES:



Pt fully cleaned and linens changed. Pt had a small yellow loose BM. Pt noted have 
generalized edema and a small skin tear on her lower rt abdomen causing a moderate amount of 
serous fluid to weep from the area requiring frequent linen changes. Pt repositioned for 
comfort. Pt remains on BL soft wrist restraints for safety- surround skin intact and pulses 
present. No distress noted.

## 2020-10-03 NOTE — NUR
NURSE NOTES:

Per Dr. Cherry, no Chest Tube reinsertion.  Will endorse to AM Charge to call ED MD to 
remove Chest Tube.

## 2020-10-03 NOTE — NUR
NURSE NOTES:

Called and LM for Dr. Cherry re: update re: chest tube placement.  Will await additional 
directions to from MD.

## 2020-10-03 NOTE — PULMONOLGY CRITICAL CARE NOTE
Critical Care - Asmt/Plan


Problems:  


(1) Acute respiratory failure


(2) Pneumothorax


Assessment & Plan:  resolved





(3) Bacteremia


(4) Pneumonia


(5) Sepsis


(6) HCAP (healthcare-associated pneumonia)


(7) Seizure disorder


(8) Down's syndrome


(9) Trisomy 21, Down syndrome


Respiratory:  monitor respiratory rate, adjust FIO2


Cardiac:  continue to monitor HR/BP


Renal:  F/U I&O, keep IV fluid


Infectious Disease:  check cultures


Gastrointestinal:  continue feedings/current rate


Endocrine:  monitor blood sugar, continue sliding scale insulin


Hematologic:  transfuse if hgb<8.5


Neurologic:  PRN Ativan, keep patient comfortable


Affect:  PRN ativan


Disposition:  keep in ICU


Time Spent (Minutes):  40


Notes Reviewed:  cardio, renal


Discussed with:  nurses, consultants, 





Critical Care - Objective





Last 24 Hour Vital Signs








  Date Time  Temp Pulse Resp B/P (MAP) Pulse Ox O2 Delivery O2 Flow Rate FiO2


 


10/3/20 15:00  58 27 113/69 (84) 99   


 


10/3/20 14:00  58 27 99/44 (62) 100   


 


10/3/20 13:00  64 29 87/38 (54) 99   


 


10/3/20 12:00        50


 


10/3/20 12:00  59      


 


10/3/20 12:00      Mechanical Ventilator  





      Mechanical Ventilator  


 


10/3/20 12:00 97.9 56 29 122/70 (87) 97   


 


10/3/20 11:25  56 32     80


 


10/3/20 11:00  49 24 101/64 (76) 99   


 


10/3/20 10:00  50 27 106/62 (77) 100   


 


10/3/20 09:00  70 32 126/70 (88) 99   


 


10/3/20 08:00      Mechanical Ventilator  





      Mechanical Ventilator  


 


10/3/20 08:00        50


 


10/3/20 08:00  56      


 


10/3/20 08:00 97.7 57 30 123/61 (81) 99   


 


10/3/20 07:20  56 27     80


 


10/3/20 07:00  60 29 108/54 (72) 100   


 


10/3/20 06:00  62 30 101/66 (78) 98   


 


10/3/20 05:00  61 28 96/74 (81) 96   


 


10/3/20 04:00  64      


 


10/3/20 04:00        50


 


10/3/20 04:00 98.0 55 28 115/75 (88) 99   


 


10/3/20 04:00      Mechanical Ventilator  





      Mechanical Ventilator  


 


10/3/20 03:00  57 29 100/84 (89) 99   


 


10/3/20 02:53  57 27     80


 


10/3/20 02:00  55 29 114/91 (99) 97   


 


10/3/20 01:00  58 29 103/77 (86) 98   


 


10/3/20 00:00      Mechanical Ventilator  





      Mechanical Ventilator  


 


10/3/20 00:00  45      


 


10/3/20 00:00 98.4 45 24 122/72 (89) 100   


 


10/2/20 23:00  47 23 121/61 (81) 97   


 


10/2/20 22:53  49 26     80


 


10/2/20 22:00  62 29 127/90 (102) 99   


 


10/2/20 21:00  60 28 121/84 (96) 97   


 


10/2/20 20:00        50


 


10/2/20 20:00  67 27 113/67 (82) 98   


 


10/2/20 20:00      Mechanical Ventilator  





      Mechanical Ventilator  


 


10/2/20 19:00 98.9 58 26 115/59 (77) 98   


 


10/2/20 19:00  56 27     80


 


10/2/20 18:00  53 20 100/72 (81) 99   


 


10/2/20 18:00  57 23 100/72 (81) 98   


 


10/2/20 17:00  77 29 112/65 (81) 95   


 


10/2/20 16:00      Mechanical Ventilator  





      Mechanical Ventilator  


 


10/2/20 16:00  53 24 105/65 (78) 97   


 


10/2/20 16:00  67      


 


10/2/20 16:00        60








Status:  awake


Condition:  critical, grave


HEENT:  atraumatic


Lungs:  clear


Heart:  HR/BP stable


Abdomen:  soft, active bowel sounds


Extremities:  no C/C/E


Accucheck:  111





Critical Care - Subjective


ROS Limited/Unobtainable:  Yes


Condition:  critical


FI02:  50


Vent Support Breath Rate:  24


Vent Support Mode:  AC


Vent Tidal Volume:  500


Sputum Amount:  Small


PEEP:  0.0


PIP:  28


Tube Feeding Amount:  50


I&O:











Intake and Output  


 


 10/2/20 10/3/20





 19:00 07:00


 


Intake Total 527.5 ml 350 ml


 


Output Total 665 ml 925 ml


 


Balance -137.5 ml -575 ml


 


  


 


IV Total 487.5 ml 


 


Tube Feeding  150 ml


 


Other 40 ml 200 ml


 


Output Urine Total 665 ml 925 ml


 


# Bowel Movements 2 








ET-Tube:  7.5


ET Position:  22


Labs:





Laboratory Tests








Test


 10/2/20


17:49 10/2/20


21:50 10/3/20


00:53 10/3/20


08:49


 


POC Whole Blood Glucose


 153 MG/DL


()  H 122 MG/DL


()  H 109 MG/DL


()  H 120 MG/DL


()  H


 


Test


 10/3/20


12:17 


 


 





 


POC Whole Blood Glucose


 111 MG/DL


()  H 


 


 




















Chano Cherry MD               Oct 3, 2020 15:30

## 2020-10-03 NOTE — NUR
NURSE NOTES:

Patient resting comfortably bedside.  Patient does not appear to be in any acute distress 
however, patient does occasionally desaturate.  RT reported that balloon is busted on ETT 
and may need to be reintubated.  Patient was reintubated on 10/2/20 so not sure if this is 
correct.  Patient is being monitored on the cardiac monitor with VSS.  Patient continues on 
the ventilator with vent settings AC 24  FIO2 50%.  Oral care was performed.  Patient 
continues to be NPO.  Patient is with a Valle catheter draining good amounts of cloudy urine 
to gravity.  Patient has a CARLOS that is patient running TKO.  R chest tube is draining serous 
drainage on the R and thoravent is on the L chest.  Safety measures are in place with bed 
locked in the lowest position, on a P200 mattress, seizure precautions and side rails up x 
2.  Will continue to monitor and carry out MD plan of care.

## 2020-10-03 NOTE — NUR
NURSE NOTES:

LE: PATIENT AWOKE, OPEN EYES, ABLE TO EYE CONTACT BUT DID NOT FOLLOW COMMANDS, ON ETT TO 
VENT, AC 24//FIO2 80%/NO PEEP, O2 SATURATION 96% NOTED, HR 50'S/MIN, SB NOTED, CHEST 
TUBE TO LEFT ANTERIOR UPPER CHEST WITH LOWER CONTINUE SUCTION STATUS, SEROSANGUINEOUS 
DISCHARGE OUTED, SECURED LINE, G TUBE INTACT AND PATENT, ONGOING VITAL AF 1.2 AT 50ML/HR, NO 
RESIDUE NOTED, KEPT HOB OVER 30 DEGREES, ASPIRATION AND SZ PRECAUTION, ABDOMEN SOFT, NON 
TENDER,F/C  INTACT AND PATENT, YELLOW COLOR URINE OUTED, PICC LINE TO LEFT UPPER ARM, INTACT 
AND PATENT, ON P200 BED, MADE LOWER BED POSITION, ON BED ALARM AND LOCKED, PLACE CALL LIGHT 
WITHIN REACH, WILL CONTINUE TO MONITOR.

## 2020-10-03 NOTE — CARDIOLOGY PROGRESS NOTE
Assessment/Plan


Assessment/Plan


sepsis


respiratory failure 


ards 


renal insuf 


bacteremia


abn cardiac enzyme due to demand 


sinus arnold stable 


thrombocytopenia resolved  


hypernatremia 


pneumothorax now s/p chest tube on the left now 











vent support 


abx 


ct in place 


tsh seems fine 


remains off pressor still at this time 


hr inthe 60's s no sig pauses on tele 


tele personally reviewed 


trach planned form 10/5


 








Subjective


ROS Limited/Unobtainable:  Yes


Subjective


on  a vent not communicative AWAKE





Objective





Last 24 Hour Vital Signs








  Date Time  Temp Pulse Resp B/P (MAP) Pulse Ox O2 Delivery O2 Flow Rate FiO2


 


10/3/20 14:00  58 27 99/44 (62) 100   


 


10/3/20 13:00  64 29 87/38 (54) 99   


 


10/3/20 12:00        50


 


10/3/20 12:00  59      


 


10/3/20 12:00      Mechanical Ventilator  





      Mechanical Ventilator  


 


10/3/20 12:00 97.9 56 29 122/70 (87) 97   


 


10/3/20 11:25  56 32     80


 


10/3/20 11:00  49 24 101/64 (76) 99   


 


10/3/20 10:00  50 27 106/62 (77) 100   


 


10/3/20 09:00  70 32 126/70 (88) 99   


 


10/3/20 08:00      Mechanical Ventilator  





      Mechanical Ventilator  


 


10/3/20 08:00        50


 


10/3/20 08:00  56      


 


10/3/20 08:00 97.7 57 30 123/61 (81) 99   


 


10/3/20 07:20  56 27     80


 


10/3/20 07:00  60 29 108/54 (72) 100   


 


10/3/20 06:00  62 30 101/66 (78) 98   


 


10/3/20 05:00  61 28 96/74 (81) 96   


 


10/3/20 04:00  64      


 


10/3/20 04:00        50


 


10/3/20 04:00 98.0 55 28 115/75 (88) 99   


 


10/3/20 04:00      Mechanical Ventilator  





      Mechanical Ventilator  


 


10/3/20 03:00  57 29 100/84 (89) 99   


 


10/3/20 02:53  57 27     80


 


10/3/20 02:00  55 29 114/91 (99) 97   


 


10/3/20 01:00  58 29 103/77 (86) 98   


 


10/3/20 00:00      Mechanical Ventilator  





      Mechanical Ventilator  


 


10/3/20 00:00  45      


 


10/3/20 00:00 98.4 45 24 122/72 (89) 100   


 


10/2/20 23:00  47 23 121/61 (81) 97   


 


10/2/20 22:53  49 26     80


 


10/2/20 22:00  62 29 127/90 (102) 99   


 


10/2/20 21:00  60 28 121/84 (96) 97   


 


10/2/20 20:00        50


 


10/2/20 20:00  67 27 113/67 (82) 98   


 


10/2/20 20:00      Mechanical Ventilator  





      Mechanical Ventilator  


 


10/2/20 19:00 98.9 58 26 115/59 (77) 98   


 


10/2/20 19:00  56 27     80


 


10/2/20 18:00  53 20 100/72 (81) 99   


 


10/2/20 18:00  57 23 100/72 (81) 98   


 


10/2/20 17:00  77 29 112/65 (81) 95   


 


10/2/20 16:00      Mechanical Ventilator  





      Mechanical Ventilator  


 


10/2/20 16:00  53 24 105/65 (78) 97   


 


10/2/20 16:00  67      


 


10/2/20 16:00        60


 


10/2/20 15:10  78 24     55








General Appearance:  no apparent distress, on vent


Cardiovascular:  normal rate, bradycardia


Respiratory/Chest:  rhonchi - bilaterally


Abdomen:  normal bowel sounds, non tender, soft


Extremities:  moderate edema











Intake and Output  


 


 10/2/20 10/3/20





 19:00 07:00


 


Intake Total 527.5 ml 350 ml


 


Output Total 665 ml 925 ml


 


Balance -137.5 ml -575 ml


 


  


 


IV Total 487.5 ml 


 


Tube Feeding  150 ml


 


Other 40 ml 200 ml


 


Output Urine Total 665 ml 925 ml


 


# Bowel Movements 2 











Laboratory Tests








Test


 10/2/20


17:49 10/2/20


21:50 10/3/20


00:53 10/3/20


08:49


 


POC Whole Blood Glucose


 153 MG/DL


()  H 122 MG/DL


()  H 109 MG/DL


()  H 120 MG/DL


()  H


 


Test


 10/3/20


12:17 


 


 





 


POC Whole Blood Glucose


 111 MG/DL


()  H 


 


 




















Constantine Jorgensen MD           Oct 3, 2020 15:01

## 2020-10-03 NOTE — NUR
NURSE NOTES:

Spoke to the Radiologist re: STAT CXR.  Chest tube is not seen entering the pleural space.  
Only seen in the subcutaneous fat.  L chest tube is unchanged.  Will call Dr. Jo Ann lanier.

## 2020-10-03 NOTE — NUR
NURSE NOTES:



Dr Cherry at bedside assessing pt. Per Dr Cherry, Pt no longer requires the Rt chest tube 
and advises to have it pulled out. Will talk to Dr Aranda when he makes his rounds. Also 
discussed the ETT balloon, in which we are to just monitor pt's oxygenation. Will not 
reintubate at this time. Will continue to monitor.

## 2020-10-03 NOTE — NUR
NURSE NOTES:



O2sat 100%. No evidence of respiratory distress at this time. Pt turned and repositioned for 
comfort.

## 2020-10-03 NOTE — SURGERY PROGRESS NOTE
Surgery Progress Note


Subjective


Additional Comments


right chest tube removed


no n/v


left ptx s/p tube





Objective





Last 24 Hour Vital Signs








  Date Time  Temp Pulse Resp B/P (MAP) Pulse Ox O2 Delivery O2 Flow Rate FiO2


 


10/3/20 07:20  56 27     80


 


10/3/20 07:00  60 29 108/54 (72) 100   


 


10/3/20 06:00  62 30 101/66 (78) 98   


 


10/3/20 05:00  61 28 96/74 (81) 96   


 


10/3/20 04:00  64      


 


10/3/20 04:00        50


 


10/3/20 04:00 98.0 55 28 115/75 (88) 99   


 


10/3/20 04:00      Mechanical Ventilator  





      Mechanical Ventilator  


 


10/3/20 03:00  57 29 100/84 (89) 99   


 


10/3/20 02:53  57 27     80


 


10/3/20 02:00  55 29 114/91 (99) 97   


 


10/3/20 01:00  58 29 103/77 (86) 98   


 


10/3/20 00:00      Mechanical Ventilator  





      Mechanical Ventilator  


 


10/3/20 00:00  45      


 


10/3/20 00:00 98.4 45 24 122/72 (89) 100   


 


10/2/20 23:00  47 23 121/61 (81) 97   


 


10/2/20 22:53  49 26     80


 


10/2/20 22:00  62 29 127/90 (102) 99   


 


10/2/20 21:00  60 28 121/84 (96) 97   


 


10/2/20 20:00        50


 


10/2/20 20:00  67 27 113/67 (82) 98   


 


10/2/20 20:00      Mechanical Ventilator  





      Mechanical Ventilator  


 


10/2/20 19:00 98.9 58 26 115/59 (77) 98   


 


10/2/20 19:00  56 27     80


 


10/2/20 18:00  53 20 100/72 (81) 99   


 


10/2/20 18:00  57 23 100/72 (81) 98   


 


10/2/20 17:00  77 29 112/65 (81) 95   


 


10/2/20 16:00      Mechanical Ventilator  





      Mechanical Ventilator  


 


10/2/20 16:00  53 24 105/65 (78) 97   


 


10/2/20 16:00  67      


 


10/2/20 16:00        60


 


10/2/20 15:10  78 24     55


 


10/2/20 15:00  78 19 94/74 (81) 97   


 


10/2/20 14:00  69 24 104/71 (82) 94   


 


10/2/20 13:00  67 28 99/69 (79) 94   


 


10/2/20 12:40  74 28     55


 


10/2/20 12:00 98.6 55 23 107/61 (76) 95   


 


10/2/20 12:00  68      


 


10/2/20 12:00      Mechanical Ventilator  





      Mechanical Ventilator  





      Mechanical Ventilator  


 


10/2/20 12:00        60


 


10/2/20 11:05  56 27     55


 


10/2/20 11:00  53 23 106/58 (74) 94   


 


10/2/20 10:00  57 34 121/65 (83) 100   








I&O











Intake and Output  


 


 10/2/20 10/3/20





 19:00 07:00


 


Intake Total 527.5 ml 350 ml


 


Output Total 665 ml 925 ml


 


Balance -137.5 ml -575 ml


 


  


 


IV Total 487.5 ml 


 


Tube Feeding  150 ml


 


Other 40 ml 200 ml


 


Output Urine Total 665 ml 925 ml


 


# Bowel Movements 2 








Cardiovascular:  RSR


Respiratory:  decreased breath sounds


Abdomen:  non-tender, present bowel sounds


Extremities:  no edema, no tenderness, no cyanosis





Laboratory Tests








Test


 10/2/20


12:03 10/2/20


17:49 10/2/20


21:50 10/3/20


00:53


 


POC Whole Blood Glucose


 138 MG/DL


()  H 153 MG/DL


()  H 122 MG/DL


()  H 109 MG/DL


()  H


 


Test


 10/3/20


08:49 


 


 





 


POC Whole Blood Glucose


 120 MG/DL


()  H 


 


 














Plan


Problems:  


(1) Respiratory distress


Assessment & Plan:  Respiratory insufficiency requiring prolonged ventilator 

support.  Patient on minimal vent settings right now currently in stable but 

unfortunately not safe for extubation.  Tracheostomy is indicated recommended.  

I discussed case with pulmonology team ICU team medical teams.  Patient unable 

to make decisions and her current condition and given her history.  The care 

team has been contacted and will be reviewed for evaluation and consideration of

the tracheostomy.  If consented I think it is reasonable for tracheostomy given 

patient's current CODE STATUS care plan and goals of care.  Extubation his 

current status is potentially high risk for reintubation emergency complication.

 We will plan for tracheostomy if consent is obtained.  Thank you for let me 

participate patient's care will follow with recommendations





(2) Encephalopathy chronic


(3) Dysphagia


(4) Down's syndrome


(5) Sepsis


(6) Acute respiratory failure


(7) Pneumonia


(8) Trisomy 21, Down syndrome


(9) DAVID (acute kidney injury)


(10) Bacteremia


(11) Hypokalemia


(12) Anemia


(13) Diarrhea


(14) Hypernatremia


(15) Pneumothorax


(16) Pneumothorax, right


Assessment & Plan:  right ptx.  s/p chest tube


tube removed 10/3





left ptx tube placed 10/2





(17) Cardiac arrest


(18) ARDS (adult respiratory distress syndrome)


(19) Septic shock


(20) HCAP (healthcare-associated pneumonia)


(21) Respiratory failure requiring intubation


(22) Seizure disorder


(23) Hypothyroidism











Jake Aranda                 Oct 3, 2020 09:25

## 2020-10-03 NOTE — NUR
NURSE NOTES:

Followed up regarding the CXR results with Dr. Cherry.  Will await instructions as to who 
will replace chest tube.

## 2020-10-03 NOTE — NUR
NURSE NOTES:



Pt turned and repositioned. No distress noted at this time. Left pleural vent remains to 
suction.

## 2020-10-03 NOTE — NUR
NURSE NOTES:

At patient check, patient noted to be wet.  Gown, slider and area all around R chest tube 
was wet.  Followed the pleura vac that was bubbling in the chamber from to the patient and 
noted very little output from chest tube site because it appears to be leaking onto bed.  
Upon assessing, noted patient pulling on chest tube.  Immediately initiated BL soft wrist 
restraints for safety.  Changed patient's gown, requested for one of the nurse's to enter in 
STAT CXR to determine extent of chest tube removal and will call MD for follow up.  O2 sats 
have been DEC.  VS remain stable at this time, patient is AAO.  Will monitor patient closely 
and await the CXR and notify MD immediately when available.

## 2020-10-03 NOTE — GENERAL PROGRESS NOTE
Subjective


ROS Limited/Unobtainable:  No


Allergies:  


Coded Allergies:  


     No Known Allergies (Unverified , 1/8/19)





Objective





Last 24 Hour Vital Signs








  Date Time  Temp Pulse Resp B/P (MAP) Pulse Ox O2 Delivery O2 Flow Rate FiO2


 


10/3/20 07:20  56 27     80


 


10/3/20 07:00  60 29 108/54 (72) 100   


 


10/3/20 06:00  62 30 101/66 (78) 98   


 


10/3/20 05:00  61 28 96/74 (81) 96   


 


10/3/20 04:00  64      


 


10/3/20 04:00        50


 


10/3/20 04:00 98.0 55 28 115/75 (88) 99   


 


10/3/20 04:00      Mechanical Ventilator  





      Mechanical Ventilator  


 


10/3/20 03:00  57 29 100/84 (89) 99   


 


10/3/20 02:53  57 27     80


 


10/3/20 02:00  55 29 114/91 (99) 97   


 


10/3/20 01:00  58 29 103/77 (86) 98   


 


10/3/20 00:00      Mechanical Ventilator  





      Mechanical Ventilator  


 


10/3/20 00:00  45      


 


10/3/20 00:00 98.4 45 24 122/72 (89) 100   


 


10/2/20 23:00  47 23 121/61 (81) 97   


 


10/2/20 22:53  49 26     80


 


10/2/20 22:00  62 29 127/90 (102) 99   


 


10/2/20 21:00  60 28 121/84 (96) 97   


 


10/2/20 20:00        50


 


10/2/20 20:00  67 27 113/67 (82) 98   


 


10/2/20 20:00      Mechanical Ventilator  





      Mechanical Ventilator  


 


10/2/20 19:00 98.9 58 26 115/59 (77) 98   


 


10/2/20 19:00  56 27     80


 


10/2/20 18:00  53 20 100/72 (81) 99   


 


10/2/20 18:00  57 23 100/72 (81) 98   


 


10/2/20 17:00  77 29 112/65 (81) 95   


 


10/2/20 16:00      Mechanical Ventilator  





      Mechanical Ventilator  


 


10/2/20 16:00  53 24 105/65 (78) 97   


 


10/2/20 16:00  67      


 


10/2/20 16:00        60


 


10/2/20 15:10  78 24     55


 


10/2/20 15:00  78 19 94/74 (81) 97   


 


10/2/20 14:00  69 24 104/71 (82) 94   


 


10/2/20 13:00  67 28 99/69 (79) 94   


 


10/2/20 12:40  74 28     55


 


10/2/20 12:00 98.6 55 23 107/61 (76) 95   


 


10/2/20 12:00  68      


 


10/2/20 12:00      Mechanical Ventilator  





      Mechanical Ventilator  





      Mechanical Ventilator  


 


10/2/20 12:00        60


 


10/2/20 11:05  56 27     55


 


10/2/20 11:00  53 23 106/58 (74) 94   


 


10/2/20 10:00  57 34 121/65 (83) 100   


 


10/2/20 09:20  58 25 129/87 (101) 93   


 


10/2/20 09:00  54 25 146/100 (115) 96   


 


10/2/20 08:30  46 25 106/64 (78) 95   

















Intake and Output  


 


 10/2/20 10/3/20





 19:00 07:00


 


Intake Total 527.5 ml 350 ml


 


Output Total 665 ml 925 ml


 


Balance -137.5 ml -575 ml


 


  


 


IV Total 487.5 ml 


 


Tube Feeding  150 ml


 


Other 40 ml 200 ml


 


Output Urine Total 665 ml 925 ml


 


# Bowel Movements 2 








Laboratory Tests


10/2/20 12:03: POC Whole Blood Glucose 138H


10/2/20 17:49: POC Whole Blood Glucose 153H


10/2/20 21:50: POC Whole Blood Glucose 122H


10/3/20 00:53: POC Whole Blood Glucose 109H


Height (Feet):  5


Height (Inches):  3.00


Weight (Pounds):  172


General Appearance:  no apparent distress


EENT:  normal ENT inspection


Neck:  supple


Cardiovascular:  normal rate


Respiratory/Chest:  decreased breath sounds


Abdomen:  normal bowel sounds, non tender, soft


Extremities:  non-tender





Assessment/Plan


Status:  stable, not improved, unchanged


Assessment/Plan:


1. History of Down syndrome.


2. Dysphagia with G-tube.


3. Seizure disorder.


4. Hypothyroidism.


5. DAVID.


6. Pneumonia.


7. Sepsis.








fu H&H


prn blood transfusion to keep HGB above 7


ppi


GTF


hold GI procedures for now


pulled chest tube


pending possible Trach











Ted Nagy MD              Oct 3, 2020 08:11

## 2020-10-03 NOTE — NUR
NURSE HAND-OFF REPORT: 



Latest Vital Signs: Temperature 97.6 , Pulse 52 , B/P 109 /64 , Respiratory Rate 21 , O2 SAT 
99 , Mechanical Ventilator, FiO2 50% .  

Vital Sign Comment: 



EKG Rhythm: Sinus Bradycardia

Rhythm change?: N 

MD Notified?:  

MD Response: 



Latest Momin Fall Score: 70  

Fall Risk: High Risk 

Safety Measures: Call light Within Reach, Bed Alarm Zone 1, Side Rails Side Rails x3, Bed 
position Low and Locked.

Fall Precautions: 

Door Sign



Report given to LAYTON Gastelum.

## 2020-10-03 NOTE — NUR
NURSE NOTES:



Pt repositioned and suctioned. no acute resp distress noted at this time. O2 sat 99%.

## 2020-10-03 NOTE — NUR
HAND-OFF: 

Report given to LAYTON Quezada. Endorsed to AM RN that chest tube was pulled by patient.  Dr. Cherry has responded and does not want it reinserted.  Patient with VSS and not in any 
acute distress.

## 2020-10-03 NOTE — INTERNAL MED PROGRESS NOTE
Subjective


Date of Service:  Oct 3, 2020


Physician Name


Sanya Schultz


Attending Physician


Chano Cherry MD





Current Medications








 Medications


  (Trade)  Dose


 Ordered  Sig/Didi


 Route


 PRN Reason  Start Time


 Stop Time Status Last Admin


Dose Admin


 


 Acetaminophen


  (Tylenol)  650 mg  Q4H  PRN


 GT


 For Pain  9/23/20 17:15


 10/23/20 17:14  10/2/20 17:46





 


 Chlorhexidine


 Gluconate


  (Beatrice-Hex 2%)  1 applic  DAILY@2000


 TOPIC


   8/31/20 20:00


 11/29/20 19:59  10/2/20 22:01





 


 Clotrimazole


  (Lotrimin)  1 applic  Q12HR


 TOPIC


   8/30/20 13:00


 11/28/20 12:59  10/3/20 08:50





 


 Dextrose


  (Dextrose 50%)  25 ml  Q30M  PRN


 IV


 Hypoglycemia  8/26/20 11:30


 11/20/20 11:29   





 


 Dextrose


  (Dextrose 50%)  50 ml  Q30M  PRN


 IV


 Hypoglycemia  8/26/20 11:30


 11/20/20 11:29   





 


 Hydrocortisone


  (Solu-CORTEF)  50 mg  EVERY 12  HOURS


 IV


   10/2/20 21:00


 12/27/20 20:59  10/3/20 08:50





 


 Insulin Aspart


  (NovoLOG)    Q6HR


 SUBQ


   9/18/20 12:00


 12/17/20 11:59  10/2/20 17:51





 


 Insulin Detemir


  (Levemir)  10 units  Q12HR


 SUBQ


   9/18/20 10:00


 12/17/20 09:59  10/3/20 08:51





 


 Levothyroxine


 Sodium


  (Synthroid)  75 mcg  DAILY@0630


 GT


   9/30/20 06:30


 10/30/20 06:29  10/3/20 05:04





 


 Loperamide HCl


  (Imodium)  2 mg  Q6H  PRN


 NG


 Diarrhea  9/16/20 10:30


 10/16/20 10:29  9/23/20 11:41





 


 Pantoprazole


  (Protonix)  40 mg  EVERY 12  HOURS


 IVP


   9/28/20 21:00


 10/28/20 20:59  10/3/20 08:50





 


 Potassium Chloride


  (K-Dur)  40 meq  EVERY 12  HOURS


 GT


   9/26/20 21:00


 12/19/20 12:14  10/3/20 08:50











Allergies:  


Coded Allergies:  


     No Known Allergies (Unverified , 1/8/19)


ROS Limited/Unobtainable:  Yes


Subjective


58 YO F with Down's syndrome admitted with hypoxia.  Now sepsis and pneumonia.  

Cover for Int Med-DR Hung.  ICU.  Intubated and sedated





Objective





Last Vital Signs








  Date Time  Temp Pulse Resp B/P (MAP) Pulse Ox O2 Delivery O2 Flow Rate FiO2


 


10/3/20 12:00        50


 


10/3/20 12:00  59      


 


10/3/20 12:00      Mechanical Ventilator  





      Mechanical Ventilator  


 


10/3/20 12:00 97.9  29 122/70 (87) 97   











Laboratory Tests








Test


 10/2/20


17:49 10/2/20


21:50 10/3/20


00:53 10/3/20


08:49


 


POC Whole Blood Glucose


 153 MG/DL


()  H 122 MG/DL


()  H 109 MG/DL


()  H 120 MG/DL


()  H


 


Test


 10/3/20


12:17 


 


 





 


POC Whole Blood Glucose


 111 MG/DL


()  H 


 


 




















Intake and Output  


 


 10/2/20 10/3/20





 19:00 07:00


 


Intake Total 527.5 ml 350 ml


 


Output Total 665 ml 925 ml


 


Balance -137.5 ml -575 ml


 


  


 


IV Total 487.5 ml 


 


Tube Feeding  150 ml


 


Other 40 ml 200 ml


 


Output Urine Total 665 ml 925 ml


 


# Bowel Movements 2 








Objective


General Appearance:  WD/WN, no apparent distress, alert


EENT:  PERRL/EOMI, normal ENT inspection


Neck:  non-tender, normal alignment, supple, normal inspection


Cardiovascular:  normal peripheral pulses, normal rate, regular rhythm, no 

gallop/murmur, no JVD


Respiratory/Chest:  Mech vent; decreased breath sounds, crackles/rales, rhonchi 

- bilaterally, expiratory wheezing


Abdomen:  normal bowel sounds, non tender, soft, no organomegaly, no mass


Extremities:  normal range of motion


Neurologic:  CNs II-XII grossly normal


Skin:  normal pigmentation, warm/dry





Assessment/Plan


Problem List:  


(1) HCAP (healthcare-associated pneumonia)


Assessment & Plan:  Strep Group G.  S/P amikacin per ID=Dr Gomes.  Pulmonary/C

ritical care=DR Cherry.  COVID NEG





(2) Sepsis


Assessment & Plan:  Staph haemolyticus.  S/P amikacin per ID=Dr Gomes





(3) Down's syndrome


(4) Dysphagia


Assessment & Plan:  S/P PEG





(5) Seizure disorder


Assessment & Plan:  Continue keppra and depakote





(6) Hypothyroidism


Assessment & Plan:  Continue synthroid





(7) Acute respiratory failure


Assessment & Plan:  Pulmonary = Dr Cherry; mech vent





(8) Pneumothorax, right


Assessment & Plan:  Continue chest tube per pulmonary





(9) Cardiac arrest


Assessment & Plan:  8/26/20-see cardiology note=Sanya Dunn MD                Oct 3, 2020 13:02

## 2020-10-03 NOTE — NEPHROLOGY PROGRESS NOTE
Assessment/Plan


Problem List:  


(1) DAVID (acute kidney injury)


(2) Respiratory failure requiring intubation


(3) Down's syndrome


(4) Seizure disorder


(5) Hypothyroidism


Assessment





Acute renal failure, likely due to hypotension


Acute respiratory distress, hypoxia


Seizure disorder


Hypothyroidism


Down syndrome


Full code











Fluid challenge with IV fluids and albumin


Midodrine for BP above 100 systolic


Check TSH level


Check


Correct level


Monitor renal parameters


Urine studies


Per orders


Plan


October 3: Remains intubated on ventilator.  Due for tracheostomy October 5.  

Has left-sided chest tube.  Stable from renal standpoint to view.  Continue per 

consultants.


October 2: Remains intubated on ventilator.  Electrolyte abnormalities 

addressed.  Supplements ordered.


October 1: Intubated on ventilator.  Labs reviewed.  Potassium supplement given.

 Remains bradycardic.  Continue per consultants.


September 30: Labs reviewed.  Electrolyte abnormalities addressed.  Heart rate 

remains bradycardic.  Continue per cardiology.  Continue to monitor renal 

parameters and electrolytes.


September 29: Labs reviewed.  Electrolyte abnormalities addressed and replaced. 

Medication list reviewed.  Heart rate remains mid 50s.  Continue as is.


September 28: On ventilator.  Full code.  Heart rate low.  Will discontinue 

Midodrin.  Continue to rest.  Electrolytes within normal limit.  Discussed with 

RN.


September 27: Remains intubated.  Full code.  No plan for tracheostomy yet.  

Labs reviewed.  All acceptable.  Continue same management


September 26: Labs reviewed.  Discussed with RN.  Potassium and phosphorus and 

magnesium replacement ordered.  Hemoglobin 9.5.  Patient remains full code.  

Continue per consultants.


September 25: Lab reviewed.  Renal parameters stable.  Full code.  Intubated.  

Electrolyte abnormalities addressed and replacement done.


September 24: Lab reviewed.  Renal parameters stable.  Full code.  Intubated.  

Has right side chest tube.  Continue per consultants.


September 23: Lab reviewed.  Renal parameters stable.  Full code.  Has right 

chest tube.  Planning process for tracheostomy.  Defer to chest and general 

surgeon.


September 22: Labs reviewed.  Renal parameters stable.  Remains full code.  

Remains on ventilator.  Due for tracheostomy tomorrow.  Continue per 

consultants.  Patient has a right chest tube in place at this time.


September 21: Labs reviewed.  Electrolyte imbalances addressed and supplemented.

 Remains full code and on ventilator.  Continue to monitor renal parameters.  

Continue per consultants.


September 20: Labs reviewed.  Serum potassium again low today.  Potassium 

supplement IV and through GT given.  Patient remains full code and is on 

ventilator.  Continue per consultants.  Continue to monitor electrolytes and 

renal parameters.


September 19: Labs reviewed.  Abnormal electrolyte addressed.  Remains full 

code.  Remains vented.


September 18: Day 27 of hospitalization.  Full code.  Labs reviewed.  Hemoglobin

down to 7.5.  Electrolyte abnormalities addressed and corrections ordered.  

Continue to monitor renal parameters.  Continue per consultants.  Start on 

Levemir for blood sugar management.  Questioning continuation of hydrocortisone?


September 17: Labs reviewed.  Potassium, phosphorus, hemoglobin, are all low.  

Potassium and phosphorus IV replacement given.  Continue to monitor electrolytes

and CBC.  Patient remains full code.


September 16: Lab reviewed.  Low phosphorus low magnesium and low potassium was 

addressed.  Hemoglobin drifting lower.  Continue per consultants.  Patient 

remains full code.


September 15: Labs reviewed.  Patient continues to be on ventilator.  D5W for 

high sodium and also potassium chloride intravenously as supplement given.  

Hemoglobin 8.4.  Continue to monitor electrolytes and renal parameters.


September 14: Labs reviewed.  Low potassium and high sodium noted.  Hemoglobin 

8.1 stable.  Aim to correct abnormal electrolyte.  Continue rest.  Will give 2 

boluses of D5W 500 cc.


September 13: Lab reviewed.  Abnormal electrolytes noted and addressed.


September 12: Labs reviewed.  Potassium supplement given.  Patient remains full 

code.  Continue per consultants.


September 11: Lab reviewed.  Electrolyte abnormalities addressed.  Continue per 

pulmonary and ID.


September 10: Lab reviewed.  Status unchanged.  Serum sodium 151 unchanged.  

Stable from renal standpoint of view.


September 9: Labs reviewed.  Status quo.  D5W 500 cc IV ordered.  Continue to 

monitor renal parameters.


September 8: Status quo.  Labs reviewed.  Overall condition unchanged.  Patient 

was transfused and hemoglobin higher.  Continue current management.  Patient 

remains full code.


September 7: Status quo.  Overall condition poor.  Very low albumin.  Edematous.

 Hypotensive.  Hemoglobin lower.  Anemia work-up ordered.  I favor transfusion 2

units of packed RBCs.  Patient remains full code.  I favor supportive care only.

 Will discuss.


September 6: Electrolyte abnormalities addressed.  Serum creatinine lower.  

Continue per current management.


September 5: Status unchanged.  Lab reviewed.  Serum potassium 2.7.  IV 

potassium chloride ordered.  Serum creatinine low at 1.6 stable.  Blood pressure

90s systolic


September 4: Status quo.  Labs reviewed.  Renal parameters stable.  Serum 

creatinine down to 1.6.  Medication list reviewed.  Continues to be on 

midodrine.  Continue per consultants.


September 3: Status quo.  Labs reviewed.  Electrolytes adjusted.  Serum 

creatinine down to 1.8.  Continue per consultants.


September 2: Status quo.  Labs reviewed.  Phosphorus supplement IV given.  Serum

creatinine 2.  Continue per consultants.


September 1: Requires less pressors.  Albumin bolus given.  1 dose of Lasix IV 

ordered as the patient severely edematous.  Patient serum albumin is very low.  

Continue per consultants.


August 31: Continues to be intubated.  Labs reviewed.  Serum creatinine 1.9 

unchanged.  Blood pressure more stable.  Off 1 of the pressors.  Continue to 

monitor renal parameters.  Continue per consultants.  Patient now on 

hydrocortisone 100 mg every 8 hours.  Will decrease IV fluid.  Normal saline 

down to 50 cc an hour.


August 30: Intubated.  Labs reviewed.  Creatinine 1.9 unchanged.  Continue same 

treatment plan.  Per consultants.  Overall poor prognosis since the patient 

remains on pressors and her pulmonary status is worsening.


August 29: Remains intubated.  Labs reviewed.  Creatinine 1.9.  Blood pressure 

systolic 90s.  Continue per consultants.


August 28: Remains intubated.  Labs reviewed.  Serum creatinine lower to 2.  

Vancomycin level lower.  Remains hypotensive on pressors.  Will increase 

midodrine to 10 mg every 8 hours.  Continue per consultants.  Continue to 

monitor renal parameters.


August 27: Patient now in ICU.  Intubated.  On pressors.  Labs reviewed.  Will 

increase midodrine.  Aim to keep blood pressure over 100 systolic.  Will give 

albumin bolus.  Will check vancomycin level which was elevated when checked 

previously on August 24.  Will monitor renal parameters.  Continue per 

consultants.





Subjective


ROS Limited/Unobtainable:  Yes





Objective


Objective





Last 24 Hour Vital Signs








  Date Time  Temp Pulse Resp B/P (MAP) Pulse Ox O2 Delivery O2 Flow Rate FiO2


 


10/3/20 14:00  58 27 99/44 (62) 100   


 


10/3/20 13:00  64 29 87/38 (54) 99   


 


10/3/20 12:00        50


 


10/3/20 12:00  59      


 


10/3/20 12:00      Mechanical Ventilator  





      Mechanical Ventilator  


 


10/3/20 12:00 97.9 56 29 122/70 (87) 97   


 


10/3/20 11:25  56 32     80


 


10/3/20 11:00  49 24 101/64 (76) 99   


 


10/3/20 10:00  50 27 106/62 (77) 100   


 


10/3/20 09:00  70 32 126/70 (88) 99   


 


10/3/20 08:00      Mechanical Ventilator  





      Mechanical Ventilator  


 


10/3/20 08:00        50


 


10/3/20 08:00  56      


 


10/3/20 08:00 97.7 57 30 123/61 (81) 99   


 


10/3/20 07:20  56 27     80


 


10/3/20 07:00  60 29 108/54 (72) 100   


 


10/3/20 06:00  62 30 101/66 (78) 98   


 


10/3/20 05:00  61 28 96/74 (81) 96   


 


10/3/20 04:00  64      


 


10/3/20 04:00        50


 


10/3/20 04:00 98.0 55 28 115/75 (88) 99   


 


10/3/20 04:00      Mechanical Ventilator  





      Mechanical Ventilator  


 


10/3/20 03:00  57 29 100/84 (89) 99   


 


10/3/20 02:53  57 27     80


 


10/3/20 02:00  55 29 114/91 (99) 97   


 


10/3/20 01:00  58 29 103/77 (86) 98   


 


10/3/20 00:00      Mechanical Ventilator  





      Mechanical Ventilator  


 


10/3/20 00:00  45      


 


10/3/20 00:00 98.4 45 24 122/72 (89) 100   


 


10/2/20 23:00  47 23 121/61 (81) 97   


 


10/2/20 22:53  49 26     80


 


10/2/20 22:00  62 29 127/90 (102) 99   


 


10/2/20 21:00  60 28 121/84 (96) 97   


 


10/2/20 20:00        50


 


10/2/20 20:00  67 27 113/67 (82) 98   


 


10/2/20 20:00      Mechanical Ventilator  





      Mechanical Ventilator  


 


10/2/20 19:00 98.9 58 26 115/59 (77) 98   


 


10/2/20 19:00  56 27     80


 


10/2/20 18:00  53 20 100/72 (81) 99   


 


10/2/20 18:00  57 23 100/72 (81) 98   


 


10/2/20 17:00  77 29 112/65 (81) 95   


 


10/2/20 16:00      Mechanical Ventilator  





      Mechanical Ventilator  


 


10/2/20 16:00  53 24 105/65 (78) 97   


 


10/2/20 16:00  67      


 


10/2/20 16:00        60


 


10/2/20 15:10  78 24     55


 


10/2/20 15:00  78 19 94/74 (81) 97   

















Intake and Output  


 


 10/2/20 10/3/20





 19:00 07:00


 


Intake Total 527.5 ml 350 ml


 


Output Total 665 ml 925 ml


 


Balance -137.5 ml -575 ml


 


  


 


IV Total 487.5 ml 


 


Tube Feeding  150 ml


 


Other 40 ml 200 ml


 


Output Urine Total 665 ml 925 ml


 


# Bowel Movements 2 








Laboratory Tests


10/2/20 17:49: POC Whole Blood Glucose 153H


10/2/20 21:50: POC Whole Blood Glucose 122H


10/3/20 00:53: POC Whole Blood Glucose 109H


10/3/20 08:49: POC Whole Blood Glucose 120H


10/3/20 12:17: POC Whole Blood Glucose 111H


Height (Feet):  5


Height (Inches):  3.00


Weight (Pounds):  172


General Appearance:  no apparent distress


EENT:  other - Intubated on ventilator


Respiratory/Chest:  other - Has left chest tube











Lam Nino MD             Oct 3, 2020 14:20

## 2020-10-03 NOTE — DIAGNOSTIC IMAGING REPORT
EXAM:

  XR Chest, 1 View

 

CLINICAL HISTORY:

  Bilateral chest tube.  Nurse found patient pulling on the right-sided 

chest tube and the bed wet.

 

TECHNIQUE:

  Frontal view of the chest.

 

COMPARISON:

  10/2/2020 chest x-ray at 8 PM

 

FINDINGS:

  Lungs:  See below.  

  Pleural space:  No pneumothorax is apparent.  In the current projection,

 haziness to the bilateral lungs could reflect presence of layering 

pleural effusions.  This is superimposed on bilateral pulmonary 

infiltrates that are marginally improved on the left in the interval.

  Heart:  Heart and mediastinal contours are unchanged, showing 

borderline cardiac enlargement.

  Mediastinum:  Unremarkable.

  Bones/joints:  No acute or aggressive osseous lesions.

  Tubes, lines and devices:  Partially imaged tubing along the right 

chest wall overlaps with cardiac monitor leads.  No chest tube is 

visualized within the right pleural space.  There is a second chest tube 

directed towards the left lung apex that appears in good position on the 

available projection.  A left PICC line is present with the tip 

projecting over the distal SVC near the cavoatrial junction.  Patient 

remains intubated with ET tube terminating approximately 2 cm above the 

lisandro.

 

IMPRESSION:     

 

1.  No right chest tube is identified in the right hemithorax.  It has 

probably backed out.  Consider repositioning if clinically appropriate.

 

2.  The left chest tube appears unchanged and in apparent satisfactory 

position projecting over the left lung apex.

 

3.  Stable endotracheal intubation with slight interval improvement in 

aeration of the lungs bilaterally, more notable on the left but with 

diffuse pulmonary infiltrates still present.

 

<MYCVCSECTION>

 

Communications:

 

10/03/20 05:07 Call Doctor Regarding Above results, called ICU LAYTON Whitman on 10/03 05:07 (-07:00)

## 2020-10-04 VITALS — DIASTOLIC BLOOD PRESSURE: 56 MMHG | SYSTOLIC BLOOD PRESSURE: 93 MMHG

## 2020-10-04 VITALS — SYSTOLIC BLOOD PRESSURE: 110 MMHG | DIASTOLIC BLOOD PRESSURE: 61 MMHG

## 2020-10-04 VITALS — DIASTOLIC BLOOD PRESSURE: 76 MMHG | SYSTOLIC BLOOD PRESSURE: 125 MMHG

## 2020-10-04 VITALS — SYSTOLIC BLOOD PRESSURE: 98 MMHG | DIASTOLIC BLOOD PRESSURE: 62 MMHG

## 2020-10-04 VITALS — SYSTOLIC BLOOD PRESSURE: 106 MMHG | DIASTOLIC BLOOD PRESSURE: 81 MMHG

## 2020-10-04 VITALS — DIASTOLIC BLOOD PRESSURE: 66 MMHG | SYSTOLIC BLOOD PRESSURE: 119 MMHG

## 2020-10-04 VITALS — DIASTOLIC BLOOD PRESSURE: 68 MMHG | SYSTOLIC BLOOD PRESSURE: 127 MMHG

## 2020-10-04 VITALS — SYSTOLIC BLOOD PRESSURE: 116 MMHG | DIASTOLIC BLOOD PRESSURE: 59 MMHG

## 2020-10-04 VITALS — SYSTOLIC BLOOD PRESSURE: 107 MMHG | DIASTOLIC BLOOD PRESSURE: 57 MMHG

## 2020-10-04 VITALS — DIASTOLIC BLOOD PRESSURE: 69 MMHG | SYSTOLIC BLOOD PRESSURE: 132 MMHG

## 2020-10-04 VITALS — DIASTOLIC BLOOD PRESSURE: 90 MMHG | SYSTOLIC BLOOD PRESSURE: 103 MMHG

## 2020-10-04 VITALS — SYSTOLIC BLOOD PRESSURE: 147 MMHG | DIASTOLIC BLOOD PRESSURE: 81 MMHG

## 2020-10-04 VITALS — DIASTOLIC BLOOD PRESSURE: 57 MMHG | SYSTOLIC BLOOD PRESSURE: 97 MMHG

## 2020-10-04 VITALS — SYSTOLIC BLOOD PRESSURE: 114 MMHG | DIASTOLIC BLOOD PRESSURE: 63 MMHG

## 2020-10-04 VITALS — SYSTOLIC BLOOD PRESSURE: 104 MMHG | DIASTOLIC BLOOD PRESSURE: 58 MMHG

## 2020-10-04 VITALS — DIASTOLIC BLOOD PRESSURE: 62 MMHG | SYSTOLIC BLOOD PRESSURE: 110 MMHG

## 2020-10-04 VITALS — DIASTOLIC BLOOD PRESSURE: 65 MMHG | SYSTOLIC BLOOD PRESSURE: 119 MMHG

## 2020-10-04 VITALS — DIASTOLIC BLOOD PRESSURE: 82 MMHG | SYSTOLIC BLOOD PRESSURE: 101 MMHG

## 2020-10-04 VITALS — SYSTOLIC BLOOD PRESSURE: 107 MMHG | DIASTOLIC BLOOD PRESSURE: 64 MMHG

## 2020-10-04 VITALS — SYSTOLIC BLOOD PRESSURE: 100 MMHG | DIASTOLIC BLOOD PRESSURE: 76 MMHG

## 2020-10-04 VITALS — SYSTOLIC BLOOD PRESSURE: 104 MMHG | DIASTOLIC BLOOD PRESSURE: 60 MMHG

## 2020-10-04 VITALS — DIASTOLIC BLOOD PRESSURE: 58 MMHG | SYSTOLIC BLOOD PRESSURE: 93 MMHG

## 2020-10-04 VITALS — DIASTOLIC BLOOD PRESSURE: 82 MMHG | SYSTOLIC BLOOD PRESSURE: 114 MMHG

## 2020-10-04 VITALS — DIASTOLIC BLOOD PRESSURE: 110 MMHG | SYSTOLIC BLOOD PRESSURE: 121 MMHG

## 2020-10-04 LAB
ADD MANUAL DIFF: NO
ALBUMIN SERPL-MCNC: 1.3 G/DL (ref 3.4–5)
ALBUMIN/GLOB SERPL: 0.4 {RATIO} (ref 1–2.7)
ALP SERPL-CCNC: 56 U/L (ref 46–116)
ALT SERPL-CCNC: 24 U/L (ref 12–78)
ANION GAP SERPL CALC-SCNC: 2 MMOL/L (ref 5–15)
AST SERPL-CCNC: 17 U/L (ref 15–37)
BASOPHILS NFR BLD AUTO: 0.2 % (ref 0–2)
BILIRUB SERPL-MCNC: 0.5 MG/DL (ref 0.2–1)
BUN SERPL-MCNC: 18 MG/DL (ref 7–18)
CALCIUM SERPL-MCNC: 7.3 MG/DL (ref 8.5–10.1)
CHLORIDE SERPL-SCNC: 116 MMOL/L (ref 98–107)
CO2 SERPL-SCNC: 30 MMOL/L (ref 21–32)
CREAT SERPL-MCNC: 0.7 MG/DL (ref 0.55–1.3)
EOSINOPHIL NFR BLD AUTO: 0.6 % (ref 0–3)
ERYTHROCYTE [DISTWIDTH] IN BLOOD BY AUTOMATED COUNT: 17.2 % (ref 11.6–14.8)
GLOBULIN SER-MCNC: 3.2 G/DL
HCT VFR BLD CALC: 27.1 % (ref 37–47)
HGB BLD-MCNC: 9 G/DL (ref 12–16)
LYMPHOCYTES NFR BLD AUTO: 16.4 % (ref 20–45)
MCV RBC AUTO: 96 FL (ref 80–99)
MONOCYTES NFR BLD AUTO: 4.4 % (ref 1–10)
NEUTROPHILS NFR BLD AUTO: 78.4 % (ref 45–75)
PHOSPHATE SERPL-MCNC: 2.6 MG/DL (ref 2.5–4.9)
PLATELET # BLD: 125 K/UL (ref 150–450)
POTASSIUM SERPL-SCNC: 3.6 MMOL/L (ref 3.5–5.1)
RBC # BLD AUTO: 2.83 M/UL (ref 4.2–5.4)
SODIUM SERPL-SCNC: 148 MMOL/L (ref 136–145)
WBC # BLD AUTO: 7.5 K/UL (ref 4.8–10.8)

## 2020-10-04 RX ADMIN — INSULIN ASPART SCH UNITS: 100 INJECTION, SOLUTION INTRAVENOUS; SUBCUTANEOUS at 05:31

## 2020-10-04 RX ADMIN — INSULIN DETEMIR SCH UNITS: 100 INJECTION, SOLUTION SUBCUTANEOUS at 20:58

## 2020-10-04 RX ADMIN — CHLORHEXIDINE GLUCONATE SCH APPLIC: 213 SOLUTION TOPICAL at 20:04

## 2020-10-04 RX ADMIN — INSULIN ASPART SCH UNITS: 100 INJECTION, SOLUTION INTRAVENOUS; SUBCUTANEOUS at 17:51

## 2020-10-04 RX ADMIN — HYDROCORTISONE SODIUM SUCCINATE SCH MG: 100 INJECTION, POWDER, FOR SOLUTION INTRAMUSCULAR; INTRAVENOUS at 09:15

## 2020-10-04 RX ADMIN — PANTOPRAZOLE SODIUM SCH MG: 40 INJECTION, POWDER, FOR SOLUTION INTRAVENOUS at 20:50

## 2020-10-04 RX ADMIN — INSULIN DETEMIR SCH UNITS: 100 INJECTION, SOLUTION SUBCUTANEOUS at 09:17

## 2020-10-04 RX ADMIN — INSULIN ASPART SCH UNITS: 100 INJECTION, SOLUTION INTRAVENOUS; SUBCUTANEOUS at 12:00

## 2020-10-04 RX ADMIN — PANTOPRAZOLE SODIUM SCH MG: 40 INJECTION, POWDER, FOR SOLUTION INTRAVENOUS at 09:16

## 2020-10-04 RX ADMIN — HYDROCORTISONE SODIUM SUCCINATE SCH MG: 100 INJECTION, POWDER, FOR SOLUTION INTRAMUSCULAR; INTRAVENOUS at 20:50

## 2020-10-04 NOTE — NUR
NURSE HAND-OFF REPORT: 



Latest Vital Signs: Temperature 98.2 , Pulse 51 , B/P 119 /65 , Respiratory Rate 25 , O2 SAT 
97 , Mechanical Ventilator, O2 Flow Rate 15.0 .  

Vital Sign Comment: VSS



EKG Rhythm: Sinus Bradycardia

Rhythm change?: N 

MD Notified?: RENU Carver MD Response: No New Orders Received



Latest Momin Fall Score: 70  

Fall Risk: High Risk 

Safety Measures: Call light Within Reach, Bed Alarm Zone 1, Side Rails Side Rails x3, Bed 
position Low and Locked.

Fall Precautions: 

Yellow Socks

Door Sign

Patient Fall Education



Report given to LAYTON LONDONO.

## 2020-10-04 NOTE — CARDIOLOGY PROGRESS NOTE
Assessment/Plan


Assessment/Plan


sepsis


respiratory failure 


ards 


renal insuf 


bacteremia


abn cardiac enzyme due to demand 


sinus arnold stable 


thrombocytopenia resolved  


hypernatremia 


pneumothorax now s/p chest tube on the left now 











vent support 


abx 


 


tsh seems fine 


remains off pressor still at this time 


hr inthe 60's s no sig pauses on tele 


tele personally reviewed 


she pulled out the right sided ct has left thoravent


trach planned form 10/5





bp low has been mobilizing fluid brenda admisnter some ivf 


 








Subjective


ROS Limited/Unobtainable:  Yes


Subjective


on  a vent not communicative not  AWAKE





Objective





Last 24 Hour Vital Signs








  Date Time  Temp Pulse Resp B/P (MAP) Pulse Ox O2 Delivery O2 Flow Rate FiO2


 


10/4/20 10:00  48 24 93/58 (70) 98   


 


10/4/20 09:00  54 25 114/63 (80) 97   


 


10/4/20 08:55        70


 


10/4/20 08:55        70


 


10/4/20 08:00      Mechanical Ventilator  





      Mechanical Ventilator  


 


10/4/20 08:00 98.5 48 32 97/57 (70) 97   


 


10/4/20 08:00        80


 


10/4/20 07:56  48      


 


10/4/20 07:15  55 24     80


 


10/4/20 07:00  51 25 119/65 (83) 97   


 


10/4/20 06:00  57 26 119/66 (83) 97   


 


10/4/20 05:00  60 23 101/82 (88) 95   


 


10/4/20 04:00  51      


 


10/4/20 04:00      Mechanical Ventilator  





      Mechanical Ventilator  


 


10/4/20 04:00 98.2 51 25 110/62 (78) 98   


 


10/4/20 04:00        80


 


10/4/20 03:50  52 24     80


 


10/4/20 03:00  59 23 103/90 (94) 98   


 


10/4/20 02:00  57 25 132/69 (90) 99   


 


10/4/20 01:00  59 27 125/76 (92) 98   


 


10/4/20 00:00 98.4 56 30 114/82 (93) 98   


 


10/4/20 00:00  56      


 


10/4/20 00:00        80


 


10/4/20 00:00      Mechanical Ventilator  





      Mechanical Ventilator  


 


10/3/20 23:02  49 25     80


 


10/3/20 23:00  51 24 96/61 (73) 98   


 


10/3/20 22:00  47 23 113/62 (79) 98   


 


10/3/20 21:00  51 24 98/56 (70) 98   


 


10/3/20 20:00      Mechanical Ventilator  





      Mechanical Ventilator  


 


10/3/20 20:00 97.8 56 25 117/71 (86) 98   


 


10/3/20 20:00        80


 


10/3/20 19:46  47 24     80


 


10/3/20 19:27  46      


 


10/3/20 19:00  52 21 109/64 (79) 99   


 


10/3/20 18:00  66 24 99/56 (70) 100   


 


10/3/20 17:00 97.6 53 26 117/62 (80) 100   


 


10/3/20 16:00  56      


 


10/3/20 16:00        50


 


10/3/20 16:00      Mechanical Ventilator  





      Mechanical Ventilator  


 


10/3/20 16:00  57 32 106/53 (70) 100   


 


10/3/20 15:00  53 28     80


 


10/3/20 15:00  58 27 113/69 (84) 99   


 


10/3/20 14:00  58 27 99/44 (62) 100   


 


10/3/20 13:00  64 29 87/38 (54) 99   


 


10/3/20 12:00        50


 


10/3/20 12:00  59      


 


10/3/20 12:00      Mechanical Ventilator  





      Mechanical Ventilator  


 


10/3/20 12:00 97.9 56 29 122/70 (87) 97   


 


10/3/20 11:25  56 32     80


 


10/3/20 11:00  49 24 101/64 (76) 99   








General Appearance:  no apparent distress, on vent


Neck:  supple


Cardiovascular:  normal rate, bradycardia


Respiratory/Chest:  rhonchi - bilaterally


Abdomen:  normal bowel sounds, non tender, soft


Extremities:  no swelling











Intake and Output  


 


 10/3/20 10/4/20





 18:59 06:59


 


Intake Total 720 ml 730 ml


 


Output Total 840 ml 925 ml


 


Balance -120 ml -195 ml


 


  


 


Tube Feeding 600 ml 550 ml


 


Other 120 ml 180 ml


 


Output Urine Total 840 ml 630 ml


 


Chest Tube Drainage Total  295 ml


 


# Bowel Movements 1 2











Laboratory Tests








Test


 10/3/20


12:17 10/4/20


04:00 10/4/20


05:11 10/4/20


08:13


 


POC Whole Blood Glucose


 111 MG/DL


()  H 


 190 MG/DL


()  H 





 


White Blood Count


 


 7.5 K/UL


(4.8-10.8) 


 





 


Red Blood Count


 


 2.83 M/UL


(4.20-5.40)  L 


 





 


Hemoglobin


 


 9.0 G/DL


(12.0-16.0)  L 


 





 


Hematocrit


 


 27.1 %


(37.0-47.0)  L 


 





 


Mean Corpuscular Volume  96 FL (80-99)    


 


Mean Corpuscular Hemoglobin


 


 31.8 PG


(27.0-31.0)  H 


 





 


Mean Corpuscular Hemoglobin


Concent 


 33.2 G/DL


(32.0-36.0) 


 





 


Red Cell Distribution Width


 


 17.2 %


(11.6-14.8)  H 


 





 


Platelet Count


 


 125 K/UL


(150-450)  L 


 





 


Mean Platelet Volume


 


 7.8 FL


(6.5-10.1) 


 





 


Neutrophils (%) (Auto)


 


 78.4 %


(45.0-75.0)  H 


 





 


Lymphocytes (%) (Auto)


 


 16.4 %


(20.0-45.0)  L 


 





 


Monocytes (%) (Auto)


 


 4.4 %


(1.0-10.0) 


 





 


Eosinophils (%) (Auto)


 


 0.6 %


(0.0-3.0) 


 





 


Basophils (%) (Auto)


 


 0.2 %


(0.0-2.0) 


 





 


Sodium Level


 


 148 MMOL/L


(136-145)  H 


 





 


Potassium Level


 


 3.6 MMOL/L


(3.5-5.1) 


 





 


Chloride Level


 


 116 MMOL/L


()  H 


 





 


Carbon Dioxide Level


 


 30 MMOL/L


(21-32) 


 





 


Anion Gap


 


 2 mmol/L


(5-15)  L 


 





 


Blood Urea Nitrogen


 


 18 mg/dL


(7-18) 


 





 


Creatinine


 


 0.7 MG/DL


(0.55-1.30) 


 





 


Estimat Glomerular Filtration


Rate 


 > 60 mL/min


(>60) 


 





 


Glucose Level


 


 166 MG/DL


()  H 


 





 


Calcium Level


 


 7.3 MG/DL


(8.5-10.1)  L 


 





 


Phosphorus Level


 


 2.6 MG/DL


(2.5-4.9) 


 





 


Magnesium Level


 


 2.1 MG/DL


(1.8-2.4) 


 





 


Total Bilirubin


 


 0.5 MG/DL


(0.2-1.0) 


 





 


Aspartate Amino Transf


(AST/SGOT) 


 17 U/L (15-37)


 


 





 


Alanine Aminotransferase


(ALT/SGPT) 


 24 U/L (12-78)


 


 





 


Alkaline Phosphatase


 


 56 U/L


() 


 





 


C-Reactive Protein,


Quantitative 


 1.6 mg/dL


(0.00-0.90)  H 


 





 


Pro-B-Type Natriuretic Peptide


 


 2349 pg/mL


(0-125)  H 


 





 


Total Protein


 


 4.5 G/DL


(6.4-8.2)  L 


 





 


Albumin


 


 1.3 G/DL


(3.4-5.0)  L 


 





 


Globulin  3.2 g/dL    


 


Albumin/Globulin Ratio


 


 0.4 (1.0-2.7)


L 


 





 


Arterial Blood pH


 


 


 


 7.471


(7.350-7.450)


 


Arterial Blood Partial


Pressure CO2 


 


 


 39.9 mmHg


(35.0-45.0)


 


Arterial Blood Partial


Pressure O2 


 


 


 264.7 mmHg


(75.0-100.0)  H


 


Arterial Blood HCO3


 


 


 


 28.5 mmol/L


(22.0-26.0)  H


 


Arterial Blood Oxygen


Saturation 


 


 


 98.9 %


()


 


Arterial Blood Base Excess    4.5 (-2-2)  H


 


Cole Test    Positive  

















Constantine Jorgensen MD           Oct 4, 2020 10:44

## 2020-10-04 NOTE — NUR
NURSE NOTES:



Left chest thoracic vent remains inplace. On low continuous suction. with serous drainage in 
pleurovac.

## 2020-10-04 NOTE — NEPHROLOGY PROGRESS NOTE
Assessment/Plan


Problem List:  


(1) DAVID (acute kidney injury)


(2) Respiratory failure requiring intubation


(3) Down's syndrome


(4) Seizure disorder


(5) Hypothyroidism


Assessment





Acute renal failure, likely due to hypotension


Acute respiratory distress, hypoxia


Seizure disorder


Hypothyroidism


Down syndrome


Full code











Fluid challenge with IV fluids and albumin


Midodrine for BP above 100 systolic


Check TSH level


Check


Correct level


Monitor renal parameters


Urine studies


Per orders


Plan


October 4: Remains intubated on ventilator.  Due for tracheostomy tomorrow.  

Left chest tube present.  Patient is full code.  Labs reviewed.  Continue as is.


October 3: Remains intubated on ventilator.  Due for tracheostomy October 5.  

Has left-sided chest tube.  Stable from renal standpoint to view.  Continue per 

consultants.


October 2: Remains intubated on ventilator.  Electrolyte abnormalities 

addressed.  Supplements ordered.


October 1: Intubated on ventilator.  Labs reviewed.  Potassium supplement given.

 Remains bradycardic.  Continue per consultants.


September 30: Labs reviewed.  Electrolyte abnormalities addressed.  Heart rate 

remains bradycardic.  Continue per cardiology.  Continue to monitor renal 

parameters and electrolytes.


September 29: Labs reviewed.  Electrolyte abnormalities addressed and replaced. 

Medication list reviewed.  Heart rate remains mid 50s.  Continue as is.


September 28: On ventilator.  Full code.  Heart rate low.  Will discontinue 

Midodrin.  Continue to rest.  Electrolytes within normal limit.  Discussed with 

RN.


September 27: Remains intubated.  Full code.  No plan for tracheostomy yet.  

Labs reviewed.  All acceptable.  Continue same management


September 26: Labs reviewed.  Discussed with RN.  Potassium and phosphorus and 

magnesium replacement ordered.  Hemoglobin 9.5.  Patient remains full code.  

Continue per consultants.


September 25: Lab reviewed.  Renal parameters stable.  Full code.  Intubated.  

Electrolyte abnormalities addressed and replacement done.


September 24: Lab reviewed.  Renal parameters stable.  Full code.  Intubated.  

Has right side chest tube.  Continue per consultants.


September 23: Lab reviewed.  Renal parameters stable.  Full code.  Has right 

chest tube.  Planning process for tracheostomy.  Defer to chest and general 

surgeon.


September 22: Labs reviewed.  Renal parameters stable.  Remains full code.  

Remains on ventilator.  Due for tracheostomy tomorrow.  Continue per 

consultants.  Patient has a right chest tube in place at this time.


September 21: Labs reviewed.  Electrolyte imbalances addressed and supplemented.

 Remains full code and on ventilator.  Continue to monitor renal parameters.  

Continue per consultants.


September 20: Labs reviewed.  Serum potassium again low today.  Potassium 

supplement IV and through GT given.  Patient remains full code and is on 

ventilator.  Continue per consultants.  Continue to monitor electrolytes and 

renal parameters.


September 19: Labs reviewed.  Abnormal electrolyte addressed.  Remains full 

code.  Remains vented.


September 18: Day 27 of hospitalization.  Full code.  Labs reviewed.  Hemoglobin

down to 7.5.  Electrolyte abnormalities addressed and corrections ordered.  

Continue to monitor renal parameters.  Continue per consultants.  Start on 

Levemir for blood sugar management.  Questioning continuation of hydrocortisone?


September 17: Labs reviewed.  Potassium, phosphorus, hemoglobin, are all low.  

Potassium and phosphorus IV replacement given.  Continue to monitor electrolytes

and CBC.  Patient remains full code.


September 16: Lab reviewed.  Low phosphorus low magnesium and low potassium was 

addressed.  Hemoglobin drifting lower.  Continue per consultants.  Patient 

remains full code.


September 15: Labs reviewed.  Patient continues to be on ventilator.  D5W for 

high sodium and also potassium chloride intravenously as supplement given.  

Hemoglobin 8.4.  Continue to monitor electrolytes and renal parameters.


September 14: Labs reviewed.  Low potassium and high sodium noted.  Hemoglobin 

8.1 stable.  Aim to correct abnormal electrolyte.  Continue rest.  Will give 2 

boluses of D5W 500 cc.


September 13: Lab reviewed.  Abnormal electrolytes noted and addressed.


September 12: Labs reviewed.  Potassium supplement given.  Patient remains full 

code.  Continue per consultants.


September 11: Lab reviewed.  Electrolyte abnormalities addressed.  Continue per 

pulmonary and ID.


September 10: Lab reviewed.  Status unchanged.  Serum sodium 151 unchanged.  

Stable from renal standpoint of view.


September 9: Labs reviewed.  Status quo.  D5W 500 cc IV ordered.  Continue to 

monitor renal parameters.


September 8: Status quo.  Labs reviewed.  Overall condition unchanged.  Patient 

was transfused and hemoglobin higher.  Continue current management.  Patient r

emains full code.


September 7: Status quo.  Overall condition poor.  Very low albumin.  Edematous.

 Hypotensive.  Hemoglobin lower.  Anemia work-up ordered.  I favor transfusion 2

units of packed RBCs.  Patient remains full code.  I favor supportive care only.

 Will discuss.


September 6: Electrolyte abnormalities addressed.  Serum creatinine lower.  

Continue per current management.


September 5: Status unchanged.  Lab reviewed.  Serum potassium 2.7.  IV 

potassium chloride ordered.  Serum creatinine low at 1.6 stable.  Blood pressure

90s systolic


September 4: Status quo.  Labs reviewed.  Renal parameters stable.  Serum 

creatinine down to 1.6.  Medication list reviewed.  Continues to be on 

midodrine.  Continue per consultants.


September 3: Status quo.  Labs reviewed.  Electrolytes adjusted.  Serum 

creatinine down to 1.8.  Continue per consultants.


September 2: Status quo.  Labs reviewed.  Phosphorus supplement IV given.  Serum

creatinine 2.  Continue per consultants.


September 1: Requires less pressors.  Albumin bolus given.  1 dose of Lasix IV 

ordered as the patient severely edematous.  Patient serum albumin is very low.  

Continue per consultants.


August 31: Continues to be intubated.  Labs reviewed.  Serum creatinine 1.9 

unchanged.  Blood pressure more stable.  Off 1 of the pressors.  Continue to 

monitor renal parameters.  Continue per consultants.  Patient now on 

hydrocortisone 100 mg every 8 hours.  Will decrease IV fluid.  Normal saline 

down to 50 cc an hour.


August 30: Intubated.  Labs reviewed.  Creatinine 1.9 unchanged.  Continue same 

treatment plan.  Per consultants.  Overall poor prognosis since the patient 

remains on pressors and her pulmonary status is worsening.


August 29: Remains intubated.  Labs reviewed.  Creatinine 1.9.  Blood pressure 

systolic 90s.  Continue per consultants.


August 28: Remains intubated.  Labs reviewed.  Serum creatinine lower to 2.  

Vancomycin level lower.  Remains hypotensive on pressors.  Will increase 

midodrine to 10 mg every 8 hours.  Continue per consultants.  Continue to mon

itor renal parameters.


August 27: Patient now in ICU.  Intubated.  On pressors.  Labs reviewed.  Will 

increase midodrine.  Aim to keep blood pressure over 100 systolic.  Will give 

albumin bolus.  Will check vancomycin level which was elevated when checked 

previously on August 24.  Will monitor renal parameters.  Continue per 

consultants.





Subjective


ROS Limited/Unobtainable:  Yes





Objective


Objective





Last 24 Hour Vital Signs








  Date Time  Temp Pulse Resp B/P (MAP) Pulse Ox O2 Delivery O2 Flow Rate FiO2


 


10/4/20 11:00  47 24 107/57 (74) 96   


 


10/4/20 10:00  48 24 93/58 (70) 98   


 


10/4/20 09:00  54 25 114/63 (80) 97   


 


10/4/20 08:55        70


 


10/4/20 08:55        70


 


10/4/20 08:00      Mechanical Ventilator  





      Mechanical Ventilator  


 


10/4/20 08:00 98.5 48 32 97/57 (70) 97   


 


10/4/20 08:00        80


 


10/4/20 07:56  48      


 


10/4/20 07:15  55 24     80


 


10/4/20 07:00  51 25 119/65 (83) 97   


 


10/4/20 06:00  57 26 119/66 (83) 97   


 


10/4/20 05:00  60 23 101/82 (88) 95   


 


10/4/20 04:00  51      


 


10/4/20 04:00      Mechanical Ventilator  





      Mechanical Ventilator  


 


10/4/20 04:00 98.2 51 25 110/62 (78) 98   


 


10/4/20 04:00        80


 


10/4/20 03:50  52 24     80


 


10/4/20 03:00  59 23 103/90 (94) 98   


 


10/4/20 02:00  57 25 132/69 (90) 99   


 


10/4/20 01:00  59 27 125/76 (92) 98   


 


10/4/20 00:00 98.4 56 30 114/82 (93) 98   


 


10/4/20 00:00  56      


 


10/4/20 00:00        80


 


10/4/20 00:00      Mechanical Ventilator  





      Mechanical Ventilator  


 


10/3/20 23:02  49 25     80


 


10/3/20 23:00  51 24 96/61 (73) 98   


 


10/3/20 22:00  47 23 113/62 (79) 98   


 


10/3/20 21:00  51 24 98/56 (70) 98   


 


10/3/20 20:00      Mechanical Ventilator  





      Mechanical Ventilator  


 


10/3/20 20:00 97.8 56 25 117/71 (86) 98   


 


10/3/20 20:00        80


 


10/3/20 19:46  47 24     80


 


10/3/20 19:27  46      


 


10/3/20 19:00  52 21 109/64 (79) 99   


 


10/3/20 18:00  66 24 99/56 (70) 100   


 


10/3/20 17:00 97.6 53 26 117/62 (80) 100   


 


10/3/20 16:00  56      


 


10/3/20 16:00        50


 


10/3/20 16:00      Mechanical Ventilator  





      Mechanical Ventilator  


 


10/3/20 16:00  57 32 106/53 (70) 100   


 


10/3/20 15:00  53 28     80


 


10/3/20 15:00  58 27 113/69 (84) 99   


 


10/3/20 14:00  58 27 99/44 (62) 100   


 


10/3/20 13:00  64 29 87/38 (54) 99   


 


10/3/20 12:00        50


 


10/3/20 12:00  59      


 


10/3/20 12:00      Mechanical Ventilator  





      Mechanical Ventilator  


 


10/3/20 12:00 97.9 56 29 122/70 (87) 97   


 


10/3/20 11:25  56 32     80

















Intake and Output  


 


 10/3/20 10/4/20





 19:00 07:00


 


Intake Total 720 ml 680 ml


 


Output Total 920 ml 820 ml


 


Balance -200 ml -140 ml


 


  


 


Tube Feeding 600 ml 500 ml


 


Other 120 ml 180 ml


 


Output Urine Total 845 ml 600 ml


 


Chest Tube Drainage Total 75 ml 220 ml


 


# Bowel Movements 1 2








Laboratory Tests


10/3/20 12:17: POC Whole Blood Glucose 111H


10/4/20 04:00: 


White Blood Count 7.5, Red Blood Count 2.83L, Hemoglobin 9.0L, Hematocrit 27.1L,

Mean Corpuscular Volume 96, Mean Corpuscular Hemoglobin 31.8H, Mean Corpuscular 

Hemoglobin Concent 33.2, Red Cell Distribution Width 17.2H, Platelet Count 125L,

Mean Platelet Volume 7.8, Neutrophils (%) (Auto) 78.4H, Lymphocytes (%) (Auto) 

16.4L, Monocytes (%) (Auto) 4.4, Eosinophils (%) (Auto) 0.6, Basophils (%) 

(Auto) 0.2, Sodium Level 148H, Potassium Level 3.6, Chloride Level 116H, Carbon 

Dioxide Level 30, Anion Gap 2L, Blood Urea Nitrogen 18, Creatinine 0.7, Estimat 

Glomerular Filtration Rate > 60, Glucose Level 166H, Calcium Level 7.3L, 

Phosphorus Level 2.6, Magnesium Level 2.1, Total Bilirubin 0.5, Aspartate Amino 

Transf (AST/SGOT) 17, Alanine Aminotransferase (ALT/SGPT) 24, Alkaline 

Phosphatase 56, C-Reactive Protein, Quantitative 1.6H, Pro-B-Type Natriuretic 

Peptide 2349H, Total Protein 4.5L, Albumin 1.3L, Globulin 3.2, Albumin/Globulin 

Ratio 0.4L


10/4/20 05:11: POC Whole Blood Glucose 190H


10/4/20 08:13: 


Arterial Blood pH 7.471H, Arterial Blood Partial Pressure CO2 39.9, Arterial 

Blood Partial Pressure O2 264.7H, Arterial Blood HCO3 28.5H, Arterial Blood 

Oxygen Saturation 98.9, Arterial Blood Base Excess 4.5H, Cole Test Positive


Height (Feet):  5


Height (Inches):  3.00


Weight (Pounds):  172


General Appearance:  no apparent distress


EENT:  other - Intubated on ventilator


Cardiovascular:  bradycardia


Respiratory/Chest:  decreased breath sounds


Abdomen:  distended











Lam Nino MD             Oct 4, 2020 11:24

## 2020-10-04 NOTE — NUR
NURSE NOTES:



Dr. Aranda at bedside. He said that he'll decide tomorrow if patient will have 
tracheostomy or different day.

## 2020-10-04 NOTE — NUR
NURSE HAND-OFF REPORT: 



Latest Vital Signs: Temperature 98.6 , Pulse 57 , B/P 98 /62 , Respiratory Rate 31 , O2 SAT 
99 , Mechanical Ventilator, O2 Flow Rate 15.0 .  

Vital Sign Comment: On continuous IVF of 1/2 NS running at 50ml/hour.



EKG Rhythm: Sinus Bradycardia

Rhythm change?: N 



Latest Momin Fall Score: 70  

Fall Risk: High Risk 

Safety Measures: Call light Within Reach, Bed Alarm Zone 1, Side Rails Side Rails x3, Bed 
position Low and Locked.

Fall Precautions: 

Yellow Socks

Door Sign

Patient Fall Education

Contact isolation endorsed.

Report given to Lucie Floyd RN.

## 2020-10-04 NOTE — NUR
NURSE NOTES:

Received patient from Nirav Joy RN.. Orally intubated, ET size of 7.5, 24 cm in the lip, 
mechanical ventilator dependent. Ventilator settings of AC 24, Tidal Volume 500, FiO2 of 
80%, no PEEP. GT feeding on hold for synthroid. Patient asleep. Remains Sinus Jose Daniel in the 
monitor, heart rate of 5. Left upper arm PICC noted. Valle cath inplace with yellow clear 
urine output noted in drainage bag by gravity. Left chest thoracic vent in place, connected 
to low continuous suction, with serous drainage noted in the pleurovac.  Contact isolation 
observed. Will continue plan of care.

## 2020-10-04 NOTE — DIAGNOSTIC IMAGING REPORT
EXAM:

  XR Chest, 1 View

 

CLINICAL HISTORY:

  DYSPNEA

 

TECHNIQUE:

  Frontal view of the chest.

 

COMPARISON:

Chest radiograph October 03, 2020

 

FINDINGS/IMPRESSION:

Endotracheal tube in satisfactory position.  

Left upper extremity PICC line terminates in the right atrium.

Stable appearing catheter projects over the left upper chest, correlate 

clinically.

 

Extensive patchy bilateral airspace opacities, consistent with multifocal 

infiltrate/ARDS.  This is unchanged compared to October 03, 2020.  Small 

bilateral pleural effusions, left greater than right.

 

No pneumothorax.

 

The heart is enlarged.

 

Overall, no significant interval change when compared to the chest 

radiograph one day prior.

## 2020-10-04 NOTE — NUR
NURSE NOTES:

LEFT SIDE CHEST TUBE INTACT AND PATENT, KEPT LOWER CONTINUE SUCTION, SECURED LINE, WILL 
CONTINUE TO MONITOR.

## 2020-10-04 NOTE — NUR
NURSE NOTES:



Dr. Jorgensen at bedside. Latest blood pressure of 93/58. With orders obtained to bolus 1/2 
NS 250ml x 1, and to start patient on continuous IVF of 1/2 NS at 50ml/hour after the bolus.

## 2020-10-04 NOTE — NUR
NURSE NOTES:

Received pt with eyes open and with good eye contact , orally intubated on ac mode , pt vent 
always making sounds due to pt losing some volume Dr Cherry was aware, SB, SR on the 
monitor, BP stable afebrile. Left chest Thoravent to low cont suction with light yellowish 
drainage, minimal to mod in amt, no airleak Will continue to monitor.

## 2020-10-04 NOTE — SURGERY PROGRESS NOTE
Surgery Progress Note


Subjective


Additional Comments


plan trach soon


cont current care 


chest tube out on right


left okay


cxr





Objective





Last 24 Hour Vital Signs








  Date Time  Temp Pulse Resp B/P (MAP) Pulse Ox O2 Delivery O2 Flow Rate FiO2


 


10/4/20 15:27  68      


 


10/4/20 15:00  75 25     70


 


10/4/20 15:00  66 27 127/68 (87) 97   


 


10/4/20 14:00  65 28 147/81 (103) 96   


 


10/4/20 13:00  48 24 116/59 (78) 97   


 


10/4/20 12:00      Mechanical Ventilator  





      Mechanical Ventilator  


 


10/4/20 12:00 98.9 47 24 104/58 (73) 97   


 


10/4/20 11:43  46      


 


10/4/20 11:20  52 25     70


 


10/4/20 11:00  47 24 107/57 (74) 96   


 


10/4/20 10:00  48 24 93/58 (70) 98   


 


10/4/20 09:00  54 25 114/63 (80) 97   


 


10/4/20 08:55        70


 


10/4/20 08:55        70


 


10/4/20 08:00      Mechanical Ventilator  





      Mechanical Ventilator  


 


10/4/20 08:00 98.5 48 32 97/57 (70) 97   


 


10/4/20 08:00        80


 


10/4/20 07:56  48      


 


10/4/20 07:15  55 24     80


 


10/4/20 07:00  51 25 119/65 (83) 97   


 


10/4/20 06:00  57 26 119/66 (83) 97   


 


10/4/20 05:00  60 23 101/82 (88) 95   


 


10/4/20 04:00  51      


 


10/4/20 04:00      Mechanical Ventilator  





      Mechanical Ventilator  


 


10/4/20 04:00 98.2 51 25 110/62 (78) 98   


 


10/4/20 04:00        80


 


10/4/20 03:50  52 24     80


 


10/4/20 03:00  59 23 103/90 (94) 98   


 


10/4/20 02:00  57 25 132/69 (90) 99   


 


10/4/20 01:00  59 27 125/76 (92) 98   


 


10/4/20 00:00 98.4 56 30 114/82 (93) 98   


 


10/4/20 00:00  56      


 


10/4/20 00:00        80


 


10/4/20 00:00      Mechanical Ventilator  





      Mechanical Ventilator  


 


10/3/20 23:02  49 25     80


 


10/3/20 23:00  51 24 96/61 (73) 98   


 


10/3/20 22:00  47 23 113/62 (79) 98   


 


10/3/20 21:00  51 24 98/56 (70) 98   


 


10/3/20 20:00      Mechanical Ventilator  





      Mechanical Ventilator  


 


10/3/20 20:00 97.8 56 25 117/71 (86) 98   


 


10/3/20 20:00        80


 


10/3/20 19:46  47 24     80


 


10/3/20 19:27  46      


 


10/3/20 19:00  52 21 109/64 (79) 99   


 


10/3/20 18:00  66 24 99/56 (70) 100   


 


10/3/20 17:00 97.6 53 26 117/62 (80) 100   








I&O











Intake and Output  


 


 10/3/20 10/4/20





 19:00 07:00


 


Intake Total 720 ml 680 ml


 


Output Total 920 ml 820 ml


 


Balance -200 ml -140 ml


 


  


 


Tube Feeding 600 ml 500 ml


 


Other 120 ml 180 ml


 


Output Urine Total 845 ml 600 ml


 


Chest Tube Drainage Total 75 ml 220 ml


 


# Bowel Movements 1 2








Dressing:  other


Wound:  other


Cardiovascular:  RSR


Respiratory:  decreased breath sounds


Abdomen:  soft, non-tender, present bowel sounds


Extremities:  no cyanosis





Laboratory Tests








Test


 10/4/20


04:00 10/4/20


05:11 10/4/20


08:13


 


White Blood Count


 7.5 K/UL


(4.8-10.8) 


 





 


Red Blood Count


 2.83 M/UL


(4.20-5.40)  L 


 





 


Hemoglobin


 9.0 G/DL


(12.0-16.0)  L 


 





 


Hematocrit


 27.1 %


(37.0-47.0)  L 


 





 


Mean Corpuscular Volume 96 FL (80-99)    


 


Mean Corpuscular Hemoglobin


 31.8 PG


(27.0-31.0)  H 


 





 


Mean Corpuscular Hemoglobin


Concent 33.2 G/DL


(32.0-36.0) 


 





 


Red Cell Distribution Width


 17.2 %


(11.6-14.8)  H 


 





 


Platelet Count


 125 K/UL


(150-450)  L 


 





 


Mean Platelet Volume


 7.8 FL


(6.5-10.1) 


 





 


Neutrophils (%) (Auto)


 78.4 %


(45.0-75.0)  H 


 





 


Lymphocytes (%) (Auto)


 16.4 %


(20.0-45.0)  L 


 





 


Monocytes (%) (Auto)


 4.4 %


(1.0-10.0) 


 





 


Eosinophils (%) (Auto)


 0.6 %


(0.0-3.0) 


 





 


Basophils (%) (Auto)


 0.2 %


(0.0-2.0) 


 





 


Sodium Level


 148 MMOL/L


(136-145)  H 


 





 


Potassium Level


 3.6 MMOL/L


(3.5-5.1) 


 





 


Chloride Level


 116 MMOL/L


()  H 


 





 


Carbon Dioxide Level


 30 MMOL/L


(21-32) 


 





 


Anion Gap


 2 mmol/L


(5-15)  L 


 





 


Blood Urea Nitrogen


 18 mg/dL


(7-18) 


 





 


Creatinine


 0.7 MG/DL


(0.55-1.30) 


 





 


Estimat Glomerular Filtration


Rate > 60 mL/min


(>60) 


 





 


Glucose Level


 166 MG/DL


()  H 


 





 


Calcium Level


 7.3 MG/DL


(8.5-10.1)  L 


 





 


Phosphorus Level


 2.6 MG/DL


(2.5-4.9) 


 





 


Magnesium Level


 2.1 MG/DL


(1.8-2.4) 


 





 


Total Bilirubin


 0.5 MG/DL


(0.2-1.0) 


 





 


Aspartate Amino Transf


(AST/SGOT) 17 U/L (15-37)


 


 





 


Alanine Aminotransferase


(ALT/SGPT) 24 U/L (12-78)


 


 





 


Alkaline Phosphatase


 56 U/L


() 


 





 


C-Reactive Protein,


Quantitative 1.6 mg/dL


(0.00-0.90)  H 


 





 


Pro-B-Type Natriuretic Peptide


 2349 pg/mL


(0-125)  H 


 





 


Total Protein


 4.5 G/DL


(6.4-8.2)  L 


 





 


Albumin


 1.3 G/DL


(3.4-5.0)  L 


 





 


Globulin 3.2 g/dL    


 


Albumin/Globulin Ratio


 0.4 (1.0-2.7)


L 


 





 


POC Whole Blood Glucose


 


 190 MG/DL


()  H 





 


Arterial Blood pH


 


 


 7.471


(7.350-7.450)


 


Arterial Blood Partial


Pressure CO2 


 


 39.9 mmHg


(35.0-45.0)


 


Arterial Blood Partial


Pressure O2 


 


 264.7 mmHg


(75.0-100.0)  H


 


Arterial Blood HCO3


 


 


 28.5 mmol/L


(22.0-26.0)  H


 


Arterial Blood Oxygen


Saturation 


 


 98.9 %


()


 


Arterial Blood Base Excess   4.5 (-2-2)  H


 


Cole Test   Positive  











Plan


Problems:  


(1) Respiratory distress


Assessment & Plan:  Respiratory insufficiency requiring prolonged ventilator 

support.  Patient on minimal vent settings right now currently in stable but 

unfortunately not safe for extubation.  Tracheostomy is indicated recommended.  

I discussed case with pulmonology team ICU team medical teams.  Patient unable 

to make decisions and her current condition and given her history.  The care 

team has been contacted and will be reviewed for evaluation and consideration of

the tracheostomy.  If consented I think it is reasonable for tracheostomy given 

patient's current CODE STATUS care plan and goals of care.  Extubation his 

current status is potentially high risk for reintubation emergency complication.

 We will plan for tracheostomy if consent is obtained.  Thank you for let me 

participate patient's care will follow with recommendations





(2) Encephalopathy chronic


(3) Dysphagia


(4) Down's syndrome


(5) Sepsis


(6) Acute respiratory failure


(7) Pneumonia


(8) Trisomy 21, Down syndrome


(9) DAVID (acute kidney injury)


(10) Bacteremia


(11) Hypokalemia


(12) Anemia


(13) Diarrhea


(14) Hypernatremia


(15) Pneumothorax


(16) Pneumothorax, right


Assessment & Plan:  right ptx.  s/p chest tube


tube removed 10/3





left ptx tube placed 10/2





(17) Cardiac arrest


(18) ARDS (adult respiratory distress syndrome)


(19) Septic shock


(20) HCAP (healthcare-associated pneumonia)


(21) Respiratory failure requiring intubation


(22) Seizure disorder


(23) Hypothyroidism











Jake Aranda                 Oct 4, 2020 16:19

## 2020-10-04 NOTE — GENERAL PROGRESS NOTE
Subjective


ROS Limited/Unobtainable:  No


Allergies:  


Coded Allergies:  


     No Known Allergies (Unverified , 1/8/19)





Objective





Last 24 Hour Vital Signs








  Date Time  Temp Pulse Resp B/P (MAP) Pulse Ox O2 Delivery O2 Flow Rate FiO2


 


10/4/20 09:00  54 25 114/63 (80) 97   


 


10/4/20 08:55        70


 


10/4/20 08:55        70


 


10/4/20 08:00      Mechanical Ventilator  





      Mechanical Ventilator  


 


10/4/20 08:00 98.5 48 32 97/57 (70) 97   


 


10/4/20 08:00        80


 


10/4/20 07:56  48      


 


10/4/20 07:15  55 24     80


 


10/4/20 07:00  51 25 119/65 (83) 97   


 


10/4/20 06:00  57 26 119/66 (83) 97   


 


10/4/20 05:00  60 23 101/82 (88) 95   


 


10/4/20 04:00  51      


 


10/4/20 04:00      Mechanical Ventilator  





      Mechanical Ventilator  


 


10/4/20 04:00 98.2 51 25 110/62 (78) 98   


 


10/4/20 04:00        80


 


10/4/20 03:50  52 24     80


 


10/4/20 03:00  59 23 103/90 (94) 98   


 


10/4/20 02:00  57 25 132/69 (90) 99   


 


10/4/20 01:00  59 27 125/76 (92) 98   


 


10/4/20 00:00 98.4 56 30 114/82 (93) 98   


 


10/4/20 00:00  56      


 


10/4/20 00:00        80


 


10/4/20 00:00      Mechanical Ventilator  





      Mechanical Ventilator  


 


10/3/20 23:02  49 25     80


 


10/3/20 23:00  51 24 96/61 (73) 98   


 


10/3/20 22:00  47 23 113/62 (79) 98   


 


10/3/20 21:00  51 24 98/56 (70) 98   


 


10/3/20 20:00      Mechanical Ventilator  





      Mechanical Ventilator  


 


10/3/20 20:00 97.8 56 25 117/71 (86) 98   


 


10/3/20 20:00        80


 


10/3/20 19:46  47 24     80


 


10/3/20 19:27  46      


 


10/3/20 19:00  52 21 109/64 (79) 99   


 


10/3/20 18:00  66 24 99/56 (70) 100   


 


10/3/20 17:00 97.6 53 26 117/62 (80) 100   


 


10/3/20 16:00  56      


 


10/3/20 16:00        50


 


10/3/20 16:00      Mechanical Ventilator  





      Mechanical Ventilator  


 


10/3/20 16:00  57 32 106/53 (70) 100   


 


10/3/20 15:00  53 28     80


 


10/3/20 15:00  58 27 113/69 (84) 99   


 


10/3/20 14:00  58 27 99/44 (62) 100   


 


10/3/20 13:00  64 29 87/38 (54) 99   


 


10/3/20 12:00        50


 


10/3/20 12:00  59      


 


10/3/20 12:00      Mechanical Ventilator  





      Mechanical Ventilator  


 


10/3/20 12:00 97.9 56 29 122/70 (87) 97   


 


10/3/20 11:25  56 32     80


 


10/3/20 11:00  49 24 101/64 (76) 99   


 


10/3/20 10:00  50 27 106/62 (77) 100   

















Intake and Output  


 


 10/3/20 10/4/20





 19:00 07:00


 


Intake Total 720 ml 680 ml


 


Output Total 920 ml 820 ml


 


Balance -200 ml -140 ml


 


  


 


Tube Feeding 600 ml 500 ml


 


Other 120 ml 180 ml


 


Output Urine Total 845 ml 600 ml


 


Chest Tube Drainage Total 75 ml 220 ml


 


# Bowel Movements 1 2








Laboratory Tests


10/3/20 12:17: POC Whole Blood Glucose 111H


10/4/20 04:00: 


White Blood Count 7.5, Red Blood Count 2.83L, Hemoglobin 9.0L, Hematocrit 27.1L,

Mean Corpuscular Volume 96, Mean Corpuscular Hemoglobin 31.8H, Mean Corpuscular 

Hemoglobin Concent 33.2, Red Cell Distribution Width 17.2H, Platelet Count 125L,

Mean Platelet Volume 7.8, Neutrophils (%) (Auto) 78.4H, Lymphocytes (%) (Auto) 

16.4L, Monocytes (%) (Auto) 4.4, Eosinophils (%) (Auto) 0.6, Basophils (%) 

(Auto) 0.2, Sodium Level 148H, Potassium Level 3.6, Chloride Level 116H, Carbon 

Dioxide Level 30, Anion Gap 2L, Blood Urea Nitrogen 18, Creatinine 0.7, Estimat 

Glomerular Filtration Rate > 60, Glucose Level 166H, Calcium Level 7.3L, 

Phosphorus Level 2.6, Magnesium Level 2.1, Total Bilirubin 0.5, Aspartate Amino 

Transf (AST/SGOT) 17, Alanine Aminotransferase (ALT/SGPT) 24, Alkaline 

Phosphatase 56, C-Reactive Protein, Quantitative 1.6H, Pro-B-Type Natriuretic 

Peptide 2349H, Total Protein 4.5L, Albumin 1.3L, Globulin 3.2, Albumin/Globulin 

Ratio 0.4L


10/4/20 05:11: POC Whole Blood Glucose 190H


10/4/20 08:13: 


Arterial Blood pH 7.471H, Arterial Blood Partial Pressure CO2 39.9, Arterial 

Blood Partial Pressure O2 264.7H, Arterial Blood HCO3 28.5H, Arterial Blood 

Oxygen Saturation 98.9, Arterial Blood Base Excess 4.5H, Cole Test Positive


Height (Feet):  5


Height (Inches):  3.00


Weight (Pounds):  172


General Appearance:  no apparent distress


Neck:  non-tender


Cardiovascular:  normal rate


Respiratory/Chest:  decreased breath sounds


Abdomen:  hypoactive bowel sounds


Extremities:  non-tender





Assessment/Plan


Status:  stable, not improved, unchanged


Assessment/Plan:


1. History of Down syndrome.


2. Dysphagia with G-tube.


3. Seizure disorder.


4. Hypothyroidism.


5. DAVID.


6. Pneumonia.


7. Sepsis.








fu H&H


prn blood transfusion to keep HGB above 7


ppi


GTF


hold GI procedures for now


pending possible Trach











Ted Nagy MD              Oct 4, 2020 09:20

## 2020-10-04 NOTE — INTERNAL MED PROGRESS NOTE
Subjective


Date of Service:  Oct 4, 2020


Physician Name


Sanya Schultz


Attending Physician


Chano Cherry MD





Current Medications








 Medications


  (Trade)  Dose


 Ordered  Sig/Didi


 Route


 PRN Reason  Start Time


 Stop Time Status Last Admin


Dose Admin


 


 Acetaminophen


  (Tylenol)  650 mg  Q4H  PRN


 GT


 For Pain  9/23/20 17:15


 10/23/20 17:14  10/2/20 17:46





 


 Chlorhexidine


 Gluconate


  (Beatrice-Hex 2%)  1 applic  DAILY@2000


 TOPIC


   8/31/20 20:00


 11/29/20 19:59  10/3/20 19:51





 


 Clotrimazole


  (Lotrimin)  1 applic  Q12HR


 TOPIC


   8/30/20 13:00


 11/28/20 12:59  10/4/20 09:16





 


 Dextrose


  (Dextrose 50%)  25 ml  Q30M  PRN


 IV


 Hypoglycemia  8/26/20 11:30


 11/20/20 11:29   





 


 Dextrose


  (Dextrose 50%)  50 ml  Q30M  PRN


 IV


 Hypoglycemia  8/26/20 11:30


 11/20/20 11:29   





 


 Hydrocortisone


  (Solu-CORTEF)  50 mg  EVERY 12  HOURS


 IV


   10/2/20 21:00


 12/27/20 20:59  10/4/20 09:15





 


 Insulin Aspart


  (NovoLOG)    Q6HR


 SUBQ


   9/18/20 12:00


 12/17/20 11:59  10/4/20 17:51





 


 Insulin Detemir


  (Levemir)  10 units  Q12HR


 SUBQ


   9/18/20 10:00


 12/17/20 09:59  10/4/20 09:17





 


 Levothyroxine


 Sodium


  (Synthroid)  75 mcg  DAILY@0630


 GT


   9/30/20 06:30


 10/30/20 06:29  10/4/20 05:32





 


 Loperamide HCl


  (Imodium)  2 mg  Q6H  PRN


 NG


 Diarrhea  9/16/20 10:30


 10/16/20 10:29  9/23/20 11:41





 


 Pantoprazole


  (Protonix)  40 mg  EVERY 12  HOURS


 IVP


   9/28/20 21:00


 10/28/20 20:59  10/4/20 09:16





 


 Potassium Chloride


  (K-Dur)  40 meq  EVERY 12  HOURS


 GT


   9/26/20 21:00


 12/19/20 12:14  10/4/20 09:15





 


 Sodium Chloride  1,000 ml @ 


 50 mls/hr  Q20H


 IV


   10/4/20 11:00


 11/3/20 10:59  10/4/20 11:00











Allergies:  


Coded Allergies:  


     No Known Allergies (Unverified , 1/8/19)


ROS Limited/Unobtainable:  Yes


Subjective


60 YO F with Down's syndrome admitted with hypoxia.  Now sepsis and pneumonia.  

Cover for Int Phi-DR Hung.  ICU.  Intubated and sedated





Objective





Last Vital Signs








  Date Time  Temp Pulse Resp B/P (MAP) Pulse Ox O2 Delivery O2 Flow Rate FiO2


 


10/4/20 17:00 98.6 82 29 121/110 (114) 97   


 


10/4/20 17:00        70


 


10/4/20 16:00      Mechanical Ventilator  





      Mechanical Ventilator  











Laboratory Tests








Test


 10/4/20


04:00 10/4/20


05:11 10/4/20


08:13 10/4/20


17:47


 


White Blood Count


 7.5 K/UL


(4.8-10.8) 


 


 





 


Red Blood Count


 2.83 M/UL


(4.20-5.40)  L 


 


 





 


Hemoglobin


 9.0 G/DL


(12.0-16.0)  L 


 


 





 


Hematocrit


 27.1 %


(37.0-47.0)  L 


 


 





 


Mean Corpuscular Volume 96 FL (80-99)     


 


Mean Corpuscular Hemoglobin


 31.8 PG


(27.0-31.0)  H 


 


 





 


Mean Corpuscular Hemoglobin


Concent 33.2 G/DL


(32.0-36.0) 


 


 





 


Red Cell Distribution Width


 17.2 %


(11.6-14.8)  H 


 


 





 


Platelet Count


 125 K/UL


(150-450)  L 


 


 





 


Mean Platelet Volume


 7.8 FL


(6.5-10.1) 


 


 





 


Neutrophils (%) (Auto)


 78.4 %


(45.0-75.0)  H 


 


 





 


Lymphocytes (%) (Auto)


 16.4 %


(20.0-45.0)  L 


 


 





 


Monocytes (%) (Auto)


 4.4 %


(1.0-10.0) 


 


 





 


Eosinophils (%) (Auto)


 0.6 %


(0.0-3.0) 


 


 





 


Basophils (%) (Auto)


 0.2 %


(0.0-2.0) 


 


 





 


Sodium Level


 148 MMOL/L


(136-145)  H 


 


 





 


Potassium Level


 3.6 MMOL/L


(3.5-5.1) 


 


 





 


Chloride Level


 116 MMOL/L


()  H 


 


 





 


Carbon Dioxide Level


 30 MMOL/L


(21-32) 


 


 





 


Anion Gap


 2 mmol/L


(5-15)  L 


 


 





 


Blood Urea Nitrogen


 18 mg/dL


(7-18) 


 


 





 


Creatinine


 0.7 MG/DL


(0.55-1.30) 


 


 





 


Estimat Glomerular Filtration


Rate > 60 mL/min


(>60) 


 


 





 


Glucose Level


 166 MG/DL


()  H 


 


 





 


Calcium Level


 7.3 MG/DL


(8.5-10.1)  L 


 


 





 


Phosphorus Level


 2.6 MG/DL


(2.5-4.9) 


 


 





 


Magnesium Level


 2.1 MG/DL


(1.8-2.4) 


 


 





 


Total Bilirubin


 0.5 MG/DL


(0.2-1.0) 


 


 





 


Aspartate Amino Transf


(AST/SGOT) 17 U/L (15-37)


 


 


 





 


Alanine Aminotransferase


(ALT/SGPT) 24 U/L (12-78)


 


 


 





 


Alkaline Phosphatase


 56 U/L


() 


 


 





 


C-Reactive Protein,


Quantitative 1.6 mg/dL


(0.00-0.90)  H 


 


 





 


Pro-B-Type Natriuretic Peptide


 2349 pg/mL


(0-125)  H 


 


 





 


Total Protein


 4.5 G/DL


(6.4-8.2)  L 


 


 





 


Albumin


 1.3 G/DL


(3.4-5.0)  L 


 


 





 


Globulin 3.2 g/dL     


 


Albumin/Globulin Ratio


 0.4 (1.0-2.7)


L 


 


 





 


POC Whole Blood Glucose


 


 190 MG/DL


()  H 


 149 MG/DL


()  H


 


Arterial Blood pH


 


 


 7.471


(7.350-7.450) 





 


Arterial Blood Partial


Pressure CO2 


 


 39.9 mmHg


(35.0-45.0) 





 


Arterial Blood Partial


Pressure O2 


 


 264.7 mmHg


(75.0-100.0)  H 





 


Arterial Blood HCO3


 


 


 28.5 mmol/L


(22.0-26.0)  H 





 


Arterial Blood Oxygen


Saturation 


 


 98.9 %


() 





 


Arterial Blood Base Excess   4.5 (-2-2)  H 


 


Cole Test   Positive   

















Intake and Output  


 


 10/3/20 10/4/20





 19:00 07:00


 


Intake Total 720 ml 680 ml


 


Output Total 920 ml 820 ml


 


Balance -200 ml -140 ml


 


  


 


Tube Feeding 600 ml 500 ml


 


Other 120 ml 180 ml


 


Output Urine Total 845 ml 600 ml


 


Chest Tube Drainage Total 75 ml 220 ml


 


# Bowel Movements 1 2








Objective


General Appearance:  WD/WN, no apparent distress, alert


EENT:  PERRL/EOMI, normal ENT inspection


Neck:  non-tender, normal alignment, supple, normal inspection


Cardiovascular:  normal peripheral pulses, normal rate, regular rhythm, no 

gallop/murmur, no JVD


Respiratory/Chest:  Mech vent; decreased breath sounds, crackles/rales, rhonchi 

- bilaterally, expiratory wheezing


Abdomen:  normal bowel sounds, non tender, soft, no organomegaly, no mass


Extremities:  normal range of motion


Neurologic:  CNs II-XII grossly normal


Skin:  normal pigmentation, warm/dry





Assessment/Plan


Problem List:  


(1) HCAP (healthcare-associated pneumonia)


Assessment & Plan:  Strep Group G.  S/P amikacin per ID=Dr Gomes.  

Pulmonary/Critical care=DR Cherry.  COVID NEG





(2) Sepsis


Assessment & Plan:  Staph haemolyticus.  S/P amikacin per ID=Dr Gomes





(3) Down's syndrome


(4) Dysphagia


Assessment & Plan:  S/P PEG





(5) Seizure disorder


Assessment & Plan:  Continue keppra and depakote





(6) Hypothyroidism


Assessment & Plan:  Continue synthroid





(7) Acute respiratory failure


Assessment & Plan:  Pulmonary = Dr Cherry; mech vent





(8) Pneumothorax, right


Assessment & Plan:  Continue chest tube per pulmonary





(9) Cardiac arrest


Assessment & Plan:  8/26/20-see cardiology note=Sanya Dunn MD                Oct 4, 2020 18:00

## 2020-10-04 NOTE — NUR
NURSE NOTES:

Patient unable to follow commands. Turned and repositioned by 2 staff. Patient appears 
combative, and trying to reach ET tube while restraints are off during repositioning.

## 2020-10-04 NOTE — NUR
NURSE NOTES:

MORNING CARE AND ORAL CARE WAS DONE, NO DISTRESS NOTED AT THIS TIME, WILL CONTINUE PLAN OF 
CARE.

## 2020-10-05 VITALS — SYSTOLIC BLOOD PRESSURE: 101 MMHG | DIASTOLIC BLOOD PRESSURE: 53 MMHG

## 2020-10-05 VITALS — SYSTOLIC BLOOD PRESSURE: 84 MMHG | DIASTOLIC BLOOD PRESSURE: 41 MMHG

## 2020-10-05 VITALS — DIASTOLIC BLOOD PRESSURE: 40 MMHG | SYSTOLIC BLOOD PRESSURE: 112 MMHG

## 2020-10-05 VITALS — SYSTOLIC BLOOD PRESSURE: 106 MMHG | DIASTOLIC BLOOD PRESSURE: 45 MMHG

## 2020-10-05 VITALS — SYSTOLIC BLOOD PRESSURE: 88 MMHG | DIASTOLIC BLOOD PRESSURE: 44 MMHG

## 2020-10-05 VITALS — SYSTOLIC BLOOD PRESSURE: 132 MMHG | DIASTOLIC BLOOD PRESSURE: 82 MMHG

## 2020-10-05 VITALS — SYSTOLIC BLOOD PRESSURE: 113 MMHG | DIASTOLIC BLOOD PRESSURE: 87 MMHG

## 2020-10-05 VITALS — DIASTOLIC BLOOD PRESSURE: 47 MMHG | SYSTOLIC BLOOD PRESSURE: 98 MMHG

## 2020-10-05 VITALS — SYSTOLIC BLOOD PRESSURE: 95 MMHG | DIASTOLIC BLOOD PRESSURE: 42 MMHG

## 2020-10-05 VITALS — DIASTOLIC BLOOD PRESSURE: 66 MMHG | SYSTOLIC BLOOD PRESSURE: 124 MMHG

## 2020-10-05 VITALS — DIASTOLIC BLOOD PRESSURE: 71 MMHG | SYSTOLIC BLOOD PRESSURE: 99 MMHG

## 2020-10-05 VITALS — SYSTOLIC BLOOD PRESSURE: 94 MMHG | DIASTOLIC BLOOD PRESSURE: 45 MMHG

## 2020-10-05 VITALS — SYSTOLIC BLOOD PRESSURE: 97 MMHG | DIASTOLIC BLOOD PRESSURE: 54 MMHG

## 2020-10-05 VITALS — DIASTOLIC BLOOD PRESSURE: 74 MMHG | SYSTOLIC BLOOD PRESSURE: 104 MMHG

## 2020-10-05 VITALS — DIASTOLIC BLOOD PRESSURE: 39 MMHG | SYSTOLIC BLOOD PRESSURE: 112 MMHG

## 2020-10-05 VITALS — DIASTOLIC BLOOD PRESSURE: 58 MMHG | SYSTOLIC BLOOD PRESSURE: 97 MMHG

## 2020-10-05 VITALS — DIASTOLIC BLOOD PRESSURE: 49 MMHG | SYSTOLIC BLOOD PRESSURE: 116 MMHG

## 2020-10-05 VITALS — SYSTOLIC BLOOD PRESSURE: 114 MMHG | DIASTOLIC BLOOD PRESSURE: 46 MMHG

## 2020-10-05 VITALS — SYSTOLIC BLOOD PRESSURE: 90 MMHG | DIASTOLIC BLOOD PRESSURE: 47 MMHG

## 2020-10-05 VITALS — DIASTOLIC BLOOD PRESSURE: 37 MMHG | SYSTOLIC BLOOD PRESSURE: 100 MMHG

## 2020-10-05 VITALS — DIASTOLIC BLOOD PRESSURE: 82 MMHG | SYSTOLIC BLOOD PRESSURE: 98 MMHG

## 2020-10-05 VITALS — DIASTOLIC BLOOD PRESSURE: 51 MMHG | SYSTOLIC BLOOD PRESSURE: 96 MMHG

## 2020-10-05 VITALS — DIASTOLIC BLOOD PRESSURE: 62 MMHG | SYSTOLIC BLOOD PRESSURE: 116 MMHG

## 2020-10-05 VITALS — DIASTOLIC BLOOD PRESSURE: 38 MMHG | SYSTOLIC BLOOD PRESSURE: 86 MMHG

## 2020-10-05 VITALS — SYSTOLIC BLOOD PRESSURE: 79 MMHG | DIASTOLIC BLOOD PRESSURE: 62 MMHG

## 2020-10-05 LAB
ADD MANUAL DIFF: NO
ALBUMIN SERPL-MCNC: 1.4 G/DL (ref 3.4–5)
ALBUMIN/GLOB SERPL: 0.4 {RATIO} (ref 1–2.7)
ALP SERPL-CCNC: 58 U/L (ref 46–116)
ALT SERPL-CCNC: 21 U/L (ref 12–78)
ANION GAP SERPL CALC-SCNC: 5 MMOL/L (ref 5–15)
APPEARANCE UR: CLEAR
APTT BLD: 24 SEC (ref 23–33)
APTT PPP: (no result) S
AST SERPL-CCNC: 27 U/L (ref 15–37)
BASOPHILS NFR BLD AUTO: 0.6 % (ref 0–2)
BILIRUB SERPL-MCNC: 0.6 MG/DL (ref 0.2–1)
BUN SERPL-MCNC: 17 MG/DL (ref 7–18)
CALCIUM SERPL-MCNC: 7.7 MG/DL (ref 8.5–10.1)
CHLORIDE SERPL-SCNC: 113 MMOL/L (ref 98–107)
CO2 SERPL-SCNC: 28 MMOL/L (ref 21–32)
CREAT SERPL-MCNC: 0.7 MG/DL (ref 0.55–1.3)
EOSINOPHIL NFR BLD AUTO: 0.2 % (ref 0–3)
ERYTHROCYTE [DISTWIDTH] IN BLOOD BY AUTOMATED COUNT: 17.4 % (ref 11.6–14.8)
GLOBULIN SER-MCNC: 3.6 G/DL
GLUCOSE UR STRIP-MCNC: NEGATIVE MG/DL
HCT VFR BLD CALC: 31 % (ref 37–47)
HGB BLD-MCNC: 10.3 G/DL (ref 12–16)
INR PPP: 1.2 (ref 0.9–1.1)
KETONES UR QL STRIP: NEGATIVE
LEUKOCYTE ESTERASE UR QL STRIP: (no result)
LYMPHOCYTES NFR BLD AUTO: 19.9 % (ref 20–45)
MCV RBC AUTO: 97 FL (ref 80–99)
MONOCYTES NFR BLD AUTO: 1.9 % (ref 1–10)
NEUTROPHILS NFR BLD AUTO: 77.4 % (ref 45–75)
NITRITE UR QL STRIP: NEGATIVE
PH UR STRIP: 8 [PH] (ref 4.5–8)
PHOSPHATE SERPL-MCNC: 1.8 MG/DL (ref 2.5–4.9)
PLATELET # BLD: 108 K/UL (ref 150–450)
POTASSIUM SERPL-SCNC: 3.7 MMOL/L (ref 3.5–5.1)
PROT UR QL STRIP: (no result)
RBC # BLD AUTO: 3.21 M/UL (ref 4.2–5.4)
SODIUM SERPL-SCNC: 146 MMOL/L (ref 136–145)
SP GR UR STRIP: 1.01 (ref 1–1.03)
UROBILINOGEN UR-MCNC: 12 MG/DL (ref 0–1)
WBC # BLD AUTO: 12.3 K/UL (ref 4.8–10.8)

## 2020-10-05 PROCEDURE — 0B21XEZ CHANGE ENDOTRACHEAL AIRWAY IN TRACHEA, EXTERNAL APPROACH: ICD-10-PCS

## 2020-10-05 RX ADMIN — INSULIN ASPART SCH UNITS: 100 INJECTION, SOLUTION INTRAVENOUS; SUBCUTANEOUS at 06:09

## 2020-10-05 RX ADMIN — INSULIN ASPART SCH UNITS: 100 INJECTION, SOLUTION INTRAVENOUS; SUBCUTANEOUS at 23:53

## 2020-10-05 RX ADMIN — INSULIN ASPART SCH UNITS: 100 INJECTION, SOLUTION INTRAVENOUS; SUBCUTANEOUS at 17:52

## 2020-10-05 RX ADMIN — HYDROCORTISONE SODIUM SUCCINATE SCH MG: 100 INJECTION, POWDER, FOR SOLUTION INTRAMUSCULAR; INTRAVENOUS at 21:16

## 2020-10-05 RX ADMIN — INSULIN DETEMIR SCH UNITS: 100 INJECTION, SOLUTION SUBCUTANEOUS at 08:47

## 2020-10-05 RX ADMIN — PANTOPRAZOLE SODIUM SCH MG: 40 INJECTION, POWDER, FOR SOLUTION INTRAVENOUS at 21:16

## 2020-10-05 RX ADMIN — HYDROCORTISONE SODIUM SUCCINATE SCH MG: 100 INJECTION, POWDER, FOR SOLUTION INTRAMUSCULAR; INTRAVENOUS at 08:41

## 2020-10-05 RX ADMIN — CHLORHEXIDINE GLUCONATE SCH APPLIC: 213 SOLUTION TOPICAL at 20:21

## 2020-10-05 RX ADMIN — PANTOPRAZOLE SODIUM SCH MG: 40 INJECTION, POWDER, FOR SOLUTION INTRAVENOUS at 08:41

## 2020-10-05 RX ADMIN — INSULIN ASPART SCH UNITS: 100 INJECTION, SOLUTION INTRAVENOUS; SUBCUTANEOUS at 12:09

## 2020-10-05 RX ADMIN — INSULIN DETEMIR SCH UNITS: 100 INJECTION, SOLUTION SUBCUTANEOUS at 21:23

## 2020-10-05 RX ADMIN — INSULIN ASPART SCH UNITS: 100 INJECTION, SOLUTION INTRAVENOUS; SUBCUTANEOUS at 00:46

## 2020-10-05 NOTE — NUR
NURSE NOTES:

Pt received from Lucie Floyd RN. Pt is asleep in bed, opens eyes spontaneously and makes 
eye contact when called by name but unable to follow simple commands. pupils are equal and 
round 3 mm bilaterally with sluggish rxn to light. Pt noted in SR to cardiac monitor. radial 
and dorsalis pedis pulses 2+. no edema noted. Pt is mechanically ventilated with a 7.5 ETT 
noted 23 cm at the lip line with following settings: AC 24  FiO2 100% Peep 0. Right 
upper lung lobe noted with crackles; left upper lung lobe noted with rhonchi; bilat lower 
lobes diminished upon auscultation. Abd is round, soft, and non-tender with active bowel 
sounds to all quadrants. Pt has a GT clamped at this time for recent Synthroid 
administration. F/C noted draining yellow, clear urine. Skin alterations noted. Pt on STEPH 
mattress. Pt has a CARLOS PICC with dry and intact dressing running NS TKO at 5 cc/hr. BSW 
restraints noted; radial pulses palpable; skin to both wrists intact without redness. Bed in 
lowest position, alarm on, side rails up x 2 and padded per seizure precaution. Call light 
within reach. Will continue to monitor. 

-------------------------------------------------------------------------------

Addendum: 10/05/20 at 1444 by Rylie Smith RN

-------------------------------------------------------------------------------

Amendment:

Pt also receiving 1/2 NS at 50 cc/hr. 

-------------------------------------------------------------------------------

Addendum: 10/06/20 at 0814 by Rylie Smith RN

-------------------------------------------------------------------------------

Late entry:

chest tube noted from anterior left upper chest (thoravent to pleurvac drainage system). 
water level adjusted. hooked to wall suction. no air leak present.

## 2020-10-05 NOTE — PULMONOLGY CRITICAL CARE NOTE
Critical Care - Asmt/Plan


Problems:  


(1) Acute respiratory failure


(2) Pneumothorax


Assessment & Plan:  resolved





(3) Bacteremia


(4) Pneumonia


(5) Sepsis


(6) HCAP (healthcare-associated pneumonia)


(7) Seizure disorder


(8) Down's syndrome


(9) Trisomy 21, Down syndrome


Respiratory:  monitor respiratory rate, adjust FIO2, CXR


Cardiac:  stop pressors, continue to monitor HR/BP


Renal:  keep IV fluid, check electrolytes


Infectious Disease:  check cultures, continue antibiotics


Gastrointestinal:  continue feedings/current rate


Endocrine:  monitor blood sugar, continue sliding scale insulin


Hematologic:  monitor H/H, transfuse if hgb<8.5


Neurologic:  PRN Ativan, PRN Morphine, keep patient comfortable


Affect:  PRN ativan


Prophylaxis:  Protonix


Disposition:  keep in ICU


Notes Reviewed:  internist, cardio, renal


Discussed with:  nurses, consultants, 





Critical Care - Objective





Last 24 Hour Vital Signs








  Date Time  Temp Pulse Resp B/P (MAP) Pulse Ox O2 Delivery O2 Flow Rate FiO2


 


10/5/20 09:00        85


 


10/5/20 08:35        75


 


10/5/20 08:00        100


 


10/5/20 07:25  71 24     100


 


10/5/20 07:00  66 27 97/54 (68) 99   


 


10/5/20 06:00  67 18 113/87 (96) 99   


 


10/5/20 05:00  64 21 79/62 (68) 100   


 


10/5/20 04:00      Mechanical Ventilator  





      Mechanical Ventilator  


 


10/5/20 04:00 98.8 73 19 104/74 (84) 89   


 


10/5/20 04:00        100


 


10/5/20 04:00  59      


 


10/5/20 03:36  60 20 113/87 95   


 


10/5/20 03:30  73 24     70


 


10/5/20 03:06  74 26 104/60 95   


 


10/5/20 03:00  71 27 96/51 (66) 94   


 


10/5/20 02:00  67 29 116/62 (80) 99   


 


10/5/20 01:00  67 30 132/82 (99) 98   


 


10/5/20 00:00      Mechanical Ventilator  





      Mechanical Ventilator  


 


10/5/20 00:00 98.4 59 24 124/66 (85) 98   


 


10/5/20 00:00  58      


 


10/5/20 00:00        70


 


10/4/20 23:00  56 22 100/76 (84) 92   


 


10/4/20 22:36  64 24     70


 


10/4/20 22:00  60 24 93/56 (68) 90   


 


10/4/20 21:00  60 27 107/64 (78) 99   


 


10/4/20 20:00      Mechanical Ventilator  





      Mechanical Ventilator  


 


10/4/20 20:00 98.0 54 24 106/81 (89) 98   


 


10/4/20 20:00  59      


 


10/4/20 19:30  58 24     70


 


10/4/20 19:00  57 31 98/62 (74) 99   


 


10/4/20 18:00  65 23 104/60 (75) 97   


 


10/4/20 17:00 98.6 82 29 121/110 (114) 97   


 


10/4/20 17:00        70


 


10/4/20 16:00  100 29 110/61 (77) 96   


 


10/4/20 16:00        70


 


10/4/20 16:00      Mechanical Ventilator  





      Mechanical Ventilator  


 


10/4/20 15:27  68      


 


10/4/20 15:00  75 25     70


 


10/4/20 15:00  66 27 127/68 (87) 97   


 


10/4/20 14:00  65 28 147/81 (103) 96   


 


10/4/20 13:00  48 24 116/59 (78) 97   


 


10/4/20 12:00      Mechanical Ventilator  





      Mechanical Ventilator  


 


10/4/20 12:00 98.9 47 24 104/58 (73) 97   


 


10/4/20 11:43  46      


 


10/4/20 11:20  52 25     70


 


10/4/20 11:00  47 24 107/57 (74) 96   








Status:  awake


Condition:  critical


HEENT:  atraumatic


Neck:  full ROM


Lungs:  rales, rhonchi


Heart:  HR/BP stable


Abdomen:  soft, active bowel sounds


Extremities:  no C/C/E


Decubiti:  location


Accucheck:  174





Critical Care - Subjective


ROS Limited/Unobtainable:  Yes


Condition:  critical


EKG Rhythm:  Sinus Rhythm


FI02:  85


Vent Support Breath Rate:  24


Vent Support Mode:  AC


Vent Tidal Volume:  500


Sputum Amount:  Small


PEEP:  0.0


PIP:  41


Tube Feeding Amount:  50


I&O:











Intake and Output  


 


 10/4/20 10/5/20





 19:00 07:00


 


Intake Total 1410 ml 1040 ml


 


Output Total 635 ml 730 ml


 


Balance 775 ml 310 ml


 


  


 


Free Water 100 ml 90 ml


 


IV Total 650 ml 350 ml


 


Tube Feeding 600 ml 600 ml


 


Other 60 ml 


 


Output Urine Total 635 ml 730 ml


 


# Bowel Movements 1 3








CXR:


small chest tube in place at left side. No pneumo at right side


ET-Tube:  7.5


ET Position:  23


Labs:





Laboratory Tests








Test


 10/4/20


17:47 10/4/20


20:55 10/5/20


06:25 10/5/20


08:18


 


POC Whole Blood Glucose


 149 MG/DL


()  H 165 MG/DL


()  H 


 





 


White Blood Count


 


 


 12.3 K/UL


(4.8-10.8)  #H 





 


Red Blood Count


 


 


 3.21 M/UL


(4.20-5.40)  L 





 


Hemoglobin


 


 


 10.3 G/DL


(12.0-16.0)  L 





 


Hematocrit


 


 


 31.0 %


(37.0-47.0)  L 





 


Mean Corpuscular Volume   97 FL (80-99)   


 


Mean Corpuscular Hemoglobin


 


 


 32.0 PG


(27.0-31.0)  H 





 


Mean Corpuscular Hemoglobin


Concent 


 


 33.1 G/DL


(32.0-36.0) 





 


Red Cell Distribution Width


 


 


 17.4 %


(11.6-14.8)  H 





 


Platelet Count


 


 


 108 K/UL


(150-450)  L 





 


Mean Platelet Volume


 


 


 7.8 FL


(6.5-10.1) 





 


Neutrophils (%) (Auto)


 


 


 77.4 %


(45.0-75.0)  H 





 


Lymphocytes (%) (Auto)


 


 


 19.9 %


(20.0-45.0)  L 





 


Monocytes (%) (Auto)


 


 


 1.9 %


(1.0-10.0) 





 


Eosinophils (%) (Auto)


 


 


 0.2 %


(0.0-3.0) 





 


Basophils (%) (Auto)


 


 


 0.6 %


(0.0-2.0) 





 


Prothrombin Time


 


 


 12.6 SEC


(9.30-11.50)  H 





 


Prothromb Time International


Ratio 


 


 1.2 (0.9-1.1)


H 





 


Activated Partial


Thromboplast Time 


 


 24 SEC (23-33)


 





 


Sodium Level


 


 


 146 MMOL/L


(136-145)  H 





 


Potassium Level


 


 


 3.7 MMOL/L


(3.5-5.1) 





 


Chloride Level


 


 


 113 MMOL/L


()  H 





 


Carbon Dioxide Level


 


 


 28 MMOL/L


(21-32) 





 


Anion Gap


 


 


 5 mmol/L


(5-15) 





 


Blood Urea Nitrogen


 


 


 17 mg/dL


(7-18) 





 


Creatinine


 


 


 0.7 MG/DL


(0.55-1.30) 





 


Estimat Glomerular Filtration


Rate 


 


 > 60 mL/min


(>60) 





 


Glucose Level


 


 


 174 MG/DL


()  H 





 


Calcium Level


 


 


 7.7 MG/DL


(8.5-10.1)  L 





 


Phosphorus Level


 


 


 1.8 MG/DL


(2.5-4.9)  L 





 


Magnesium Level


 


 


 2.0 MG/DL


(1.8-2.4) 





 


Total Bilirubin


 


 


 0.6 MG/DL


(0.2-1.0) 





 


Aspartate Amino Transf


(AST/SGOT) 


 


 27 U/L (15-37)


 





 


Alanine Aminotransferase


(ALT/SGPT) 


 


 21 U/L (12-78)


 





 


Alkaline Phosphatase


 


 


 58 U/L


() 





 


Total Protein


 


 


 5.0 G/DL


(6.4-8.2)  L 





 


Albumin


 


 


 1.4 G/DL


(3.4-5.0)  L 





 


Globulin   3.6 g/dL   


 


Albumin/Globulin Ratio


 


 


 0.4 (1.0-2.7)


L 





 


Arterial Blood pH


 


 


 


 7.533


(7.350-7.450)


 


Arterial Blood Partial


Pressure CO2 


 


 


 33.5 mmHg


(35.0-45.0)  L


 


Arterial Blood Partial


Pressure O2 


 


 


 283.3 mmHg


(75.0-100.0)  H


 


Arterial Blood HCO3


 


 


 


 27.6 mmol/L


(22.0-26.0)  H


 


Arterial Blood Oxygen


Saturation 


 


 


 99.0 %


()


 


Arterial Blood Base Excess    4.9 (-2-2)  H


 


Cole Test    Positive  

















Chano Cherry MD               Oct 5, 2020 10:20

## 2020-10-05 NOTE — INTERNAL MED PROGRESS NOTE
Subjective


Date of Service:  Oct 5, 2020


Physician Name


Sanya Schultz


Attending Physician


Chano Cherry MD





Current Medications








 Medications


  (Trade)  Dose


 Ordered  Sig/Didi


 Route


 PRN Reason  Start Time


 Stop Time Status Last Admin


Dose Admin


 


 Acetaminophen


  (Tylenol)  650 mg  Q4H  PRN


 GT


 For Pain  9/23/20 17:15


 10/23/20 17:14  10/2/20 17:46





 


 Chlorhexidine


 Gluconate


  (Beatrice-Hex 2%)  1 applic  DAILY@2000


 TOPIC


   8/31/20 20:00


 11/29/20 19:59  10/4/20 20:04





 


 Clotrimazole


  (Lotrimin)  1 applic  Q12HR


 TOPIC


   8/30/20 13:00


 11/28/20 12:59  10/5/20 08:41





 


 Dextrose


  (Dextrose 50%)  25 ml  Q30M  PRN


 IV


 Hypoglycemia  8/26/20 11:30


 11/20/20 11:29   





 


 Dextrose


  (Dextrose 50%)  50 ml  Q30M  PRN


 IV


 Hypoglycemia  8/26/20 11:30


 11/20/20 11:29   





 


 Hydrocortisone


  (Solu-CORTEF)  50 mg  EVERY 12  HOURS


 IV


   10/2/20 21:00


 12/27/20 20:59  10/5/20 08:41





 


 Insulin Aspart


  (NovoLOG)    Q6HR


 SUBQ


   9/18/20 12:00


 12/17/20 11:59  10/5/20 17:52





 


 Insulin Detemir


  (Levemir)  10 units  Q12HR


 SUBQ


   9/18/20 10:00


 12/17/20 09:59  10/5/20 08:47





 


 Levothyroxine


 Sodium


  (Synthroid)  75 mcg  DAILY@0630


 GT


   9/30/20 06:30


 10/30/20 06:29  10/5/20 06:57





 


 Loperamide HCl


  (Imodium)  2 mg  Q6H  PRN


 NG


 Diarrhea  9/16/20 10:30


 10/16/20 10:29  9/23/20 11:41





 


 Lorazepam


  (Ativan 2mg/ml


 1ml)  2 mg  Q4H  PRN


 IV


 For Anxiety  10/5/20 00:45


 10/12/20 00:44  10/5/20 03:06





 


 Pantoprazole


  (Protonix)  40 mg  EVERY 12  HOURS


 IVP


   9/28/20 21:00


 10/28/20 20:59  10/5/20 08:41





 


 Potassium Chloride


  (K-Dur)  40 meq  EVERY 12  HOURS


 GT


   9/26/20 21:00


 12/19/20 12:14  10/5/20 08:41





 


 Sodium Chloride  1,000 ml @ 


 50 mls/hr  Q20H


 IV


   10/4/20 11:00


 11/3/20 10:59  10/5/20 06:20











Allergies:  


Coded Allergies:  


     No Known Allergies (Unverified , 1/8/19)


ROS Limited/Unobtainable:  Yes


Subjective


58 YO F with Down's syndrome admitted with hypoxia.  Now sepsis and pneumonia.  

Cover for Int Phi-DR Hung.  ICU.  Intubated and sedated





Objective





Last Vital Signs








  Date Time  Temp Pulse Resp B/P (MAP) Pulse Ox O2 Delivery O2 Flow Rate FiO2


 


10/5/20 18:00 98.8 62 24 86/38 (54) 98   


 


10/5/20 16:00      Mechanical Ventilator  





      Mechanical Ventilator  





      Mechanical Ventilator  


 


10/5/20 15:20        75











Laboratory Tests








Test


 10/4/20


20:55 10/5/20


06:25 10/5/20


08:18 10/5/20


12:05


 


POC Whole Blood Glucose


 165 MG/DL


()  H 


 


 143 MG/DL


()  H


 


White Blood Count


 


 12.3 K/UL


(4.8-10.8)  #H 


 





 


Red Blood Count


 


 3.21 M/UL


(4.20-5.40)  L 


 





 


Hemoglobin


 


 10.3 G/DL


(12.0-16.0)  L 


 





 


Hematocrit


 


 31.0 %


(37.0-47.0)  L 


 





 


Mean Corpuscular Volume  97 FL (80-99)    


 


Mean Corpuscular Hemoglobin


 


 32.0 PG


(27.0-31.0)  H 


 





 


Mean Corpuscular Hemoglobin


Concent 


 33.1 G/DL


(32.0-36.0) 


 





 


Red Cell Distribution Width


 


 17.4 %


(11.6-14.8)  H 


 





 


Platelet Count


 


 108 K/UL


(150-450)  L 


 





 


Mean Platelet Volume


 


 7.8 FL


(6.5-10.1) 


 





 


Neutrophils (%) (Auto)


 


 77.4 %


(45.0-75.0)  H 


 





 


Lymphocytes (%) (Auto)


 


 19.9 %


(20.0-45.0)  L 


 





 


Monocytes (%) (Auto)


 


 1.9 %


(1.0-10.0) 


 





 


Eosinophils (%) (Auto)


 


 0.2 %


(0.0-3.0) 


 





 


Basophils (%) (Auto)


 


 0.6 %


(0.0-2.0) 


 





 


Prothrombin Time


 


 12.6 SEC


(9.30-11.50)  H 


 





 


Prothromb Time International


Ratio 


 1.2 (0.9-1.1)


H 


 





 


Activated Partial


Thromboplast Time 


 24 SEC (23-33)


 


 





 


Sodium Level


 


 146 MMOL/L


(136-145)  H 


 





 


Potassium Level


 


 3.7 MMOL/L


(3.5-5.1) 


 





 


Chloride Level


 


 113 MMOL/L


()  H 


 





 


Carbon Dioxide Level


 


 28 MMOL/L


(21-32) 


 





 


Anion Gap


 


 5 mmol/L


(5-15) 


 





 


Blood Urea Nitrogen


 


 17 mg/dL


(7-18) 


 





 


Creatinine


 


 0.7 MG/DL


(0.55-1.30) 


 





 


Estimat Glomerular Filtration


Rate 


 > 60 mL/min


(>60) 


 





 


Glucose Level


 


 174 MG/DL


()  H 


 





 


Calcium Level


 


 7.7 MG/DL


(8.5-10.1)  L 


 





 


Phosphorus Level


 


 1.8 MG/DL


(2.5-4.9)  L 


 





 


Magnesium Level


 


 2.0 MG/DL


(1.8-2.4) 


 





 


Total Bilirubin


 


 0.6 MG/DL


(0.2-1.0) 


 





 


Aspartate Amino Transf


(AST/SGOT) 


 27 U/L (15-37)


 


 





 


Alanine Aminotransferase


(ALT/SGPT) 


 21 U/L (12-78)


 


 





 


Alkaline Phosphatase


 


 58 U/L


() 


 





 


Total Protein


 


 5.0 G/DL


(6.4-8.2)  L 


 





 


Albumin


 


 1.4 G/DL


(3.4-5.0)  L 


 





 


Globulin  3.6 g/dL    


 


Albumin/Globulin Ratio


 


 0.4 (1.0-2.7)


L 


 





 


Arterial Blood pH


 


 


 7.533


(7.350-7.450) 





 


Arterial Blood Partial


Pressure CO2 


 


 33.5 mmHg


(35.0-45.0)  L 





 


Arterial Blood Partial


Pressure O2 


 


 283.3 mmHg


(75.0-100.0)  H 





 


Arterial Blood HCO3


 


 


 27.6 mmol/L


(22.0-26.0)  H 





 


Arterial Blood Oxygen


Saturation 


 


 99.0 %


() 





 


Arterial Blood Base Excess   4.9 (-2-2)  H 


 


Cole Test   Positive   


 


Test


 10/5/20


13:30 10/5/20


17:48 


 





 


Urine Color Pale yellow     


 


Urine Appearance Clear     


 


Urine pH 8 (4.5-8.0)     


 


Urine Specific Gravity


 1.010


(1.005-1.035) 


 


 





 


Urine Protein


 1+ (NEGATIVE)


H 


 


 





 


Urine Glucose (UA)


 Negative


(NEGATIVE) 


 


 





 


Urine Ketones


 Negative


(NEGATIVE) 


 


 





 


Urine Blood


 2+ (NEGATIVE)


H 


 


 





 


Urine Nitrite


 Negative


(NEGATIVE) 


 


 





 


Urine Bilirubin


 Negative


(NEGATIVE) 


 


 





 


Urine Urobilinogen


 12 MG/DL


(0.0-1.0)  H 


 


 





 


Urine Leukocyte Esterase


 2+ (NEGATIVE)


H 


 


 





 


Urine RBC


 5-10 /HPF (0 -


2)  H 


 


 





 


Urine WBC


 10-15 /HPF (0


- 2)  H 


 


 





 


Urine Squamous Epithelial


Cells Few /LPF


(NONE/OCC) 


 


 





 


Urine Bacteria


 Moderate /HPF


(NONE)  H 


 


 





 


POC Whole Blood Glucose  Pending    

















Intake and Output  


 


 10/4/20 10/5/20





 19:00 07:00


 


Intake Total 1410 ml 1073.3333 ml


 


Output Total 635 ml 730 ml


 


Balance 775 ml 343.3333 ml


 


  


 


Free Water 100 ml 90 ml


 


IV Total 650 ml 383.3333 ml


 


Tube Feeding 600 ml 600 ml


 


Other 60 ml 


 


Output Urine Total 635 ml 730 ml


 


# Bowel Movements 1 3








Objective


General Appearance:  WD/WN, no apparent distress, alert


EENT:  PERRL/EOMI, normal ENT inspection


Neck:  non-tender, normal alignment, supple, normal inspection


Cardiovascular:  normal peripheral pulses, normal rate, regular rhythm, no 

gallop/murmur, no JVD


Respiratory/Chest:  Mech vent; decreased breath sounds, crackles/rales, rhonchi 

- bilaterally, expiratory wheezing


Abdomen:  normal bowel sounds, non tender, soft, no organomegaly, no mass


Extremities:  normal range of motion


Neurologic:  CNs II-XII grossly normal


Skin:  normal pigmentation, warm/dry





Assessment/Plan


Problem List:  


(1) HCAP (healthcare-associated pneumonia)


Assessment & Plan:  Strep Group G.  S/P amikacin per ID=Dr Gomes.  

Pulmonary/Critical care=DR Cherry.  COVID NEG





(2) Sepsis


Assessment & Plan:  Staph haemolyticus.  S/P amikacin per ID=Dr Gomes





(3) Down's syndrome


(4) Dysphagia


Assessment & Plan:  S/P PEG





(5) Seizure disorder


Assessment & Plan:  Continue keppra and depakote





(6) Hypothyroidism


Assessment & Plan:  Continue synthroid





(7) Acute respiratory failure


Assessment & Plan:  Pulmonary = Dr Cherry; [facility] vent





(8) Pneumothorax, right


Assessment & Plan:  Continue chest tube per pulmonary





(9) Cardiac arrest


Assessment & Plan:  8/26/20-see cardiology note=Sanya Dunn MD                Oct 5, 2020 18:57

## 2020-10-05 NOTE — NUR
NURSE NOTES:

Received pt , orally intubated on Ac mode SR on the monitor, Bp stable afebrile.Thoravent 
left chest to cont, suction with  moderate amt of pale yellow drainage, no air leak. 
Tolerated GT fdg. with acceptable residuals. HOB kept elevated. On aspiration 
precaution.Will continue to monitor.

## 2020-10-05 NOTE — NEPHROLOGY PROGRESS NOTE
Assessment/Plan


Problem List:  


(1) DAVID (acute kidney injury)


(2) Respiratory failure requiring intubation


(3) Down's syndrome


(4) Seizure disorder


(5) Hypothyroidism


Assessment





Acute renal failure, likely due to hypotension


Acute respiratory distress, hypoxia


Seizure disorder


Hypothyroidism


Down syndrome


Full code











Fluid challenge with IV fluids and albumin


Midodrine for BP above 100 systolic


Check TSH level


Check


Correct level


Monitor renal parameters


Urine studies


Per orders


Plan


October 5: Remains intubated on ventilator.  Discussed with RN.  Unclear if the 

patient is due for tracheostomy today or not.  Apparently the patient was 

reintubated again yesterday.  Patient has left chest tube.  Electrolyte 

abnormalities addressed.


October 4: Remains intubated on ventilator.  Due for tracheostomy tomorrow.  L

eft chest tube present.  Patient is full code.  Labs reviewed.  Continue as is.


October 3: Remains intubated on ventilator.  Due for tracheostomy October 5.  

Has left-sided chest tube.  Stable from renal standpoint to view.  Continue per 

consultants.


October 2: Remains intubated on ventilator.  Electrolyte abnormalities 

addressed.  Supplements ordered.


October 1: Intubated on ventilator.  Labs reviewed.  Potassium supplement given.

 Remains bradycardic.  Continue per consultants.


September 30: Labs reviewed.  Electrolyte abnormalities addressed.  Heart rate 

remains bradycardic.  Continue per cardiology.  Continue to monitor renal pa

rameters and electrolytes.


September 29: Labs reviewed.  Electrolyte abnormalities addressed and replaced. 

Medication list reviewed.  Heart rate remains mid 50s.  Continue as is.


September 28: On ventilator.  Full code.  Heart rate low.  Will discontinue 

Midodrin.  Continue to rest.  Electrolytes within normal limit.  Discussed with 

RN.


September 27: Remains intubated.  Full code.  No plan for tracheostomy yet.  

Labs reviewed.  All acceptable.  Continue same management


September 26: Labs reviewed.  Discussed with RN.  Potassium and phosphorus and 

magnesium replacement ordered.  Hemoglobin 9.5.  Patient remains full code.  

Continue per consultants.


September 25: Lab reviewed.  Renal parameters stable.  Full code.  Intubated.  

Electrolyte abnormalities addressed and replacement done.


September 24: Lab reviewed.  Renal parameters stable.  Full code.  Intubated.  

Has right side chest tube.  Continue per consultants.


September 23: Lab reviewed.  Renal parameters stable.  Full code.  Has right 

chest tube.  Planning process for tracheostomy.  Defer to chest and general 

surgeon.


September 22: Labs reviewed.  Renal parameters stable.  Remains full code.  

Remains on ventilator.  Due for tracheostomy tomorrow.  Continue per 

consultants.  Patient has a right chest tube in place at this time.


September 21: Labs reviewed.  Electrolyte imbalances addressed and supplemented.

 Remains full code and on ventilator.  Continue to monitor renal parameters.  

Continue per consultants.


September 20: Labs reviewed.  Serum potassium again low today.  Potassium 

supplement IV and through GT given.  Patient remains full code and is on 

ventilator.  Continue per consultants.  Continue to monitor electrolytes and 

renal parameters.


September 19: Labs reviewed.  Abnormal electrolyte addressed.  Remains full 

code.  Remains vented.


September 18: Day 27 of hospitalization.  Full code.  Labs reviewed.  Hemoglobin

down to 7.5.  Electrolyte abnormalities addressed and corrections ordered.  

Continue to monitor renal parameters.  Continue per consultants.  Start on 

Levemir for blood sugar management.  Questioning continuation of hydrocortisone?


September 17: Labs reviewed.  Potassium, phosphorus, hemoglobin, are all low.  

Potassium and phosphorus IV replacement given.  Continue to monitor electrolytes

and CBC.  Patient remains full code.


September 16: Lab reviewed.  Low phosphorus low magnesium and low potassium was 

addressed.  Hemoglobin drifting lower.  Continue per consultants.  Patient 

remains full code.


September 15: Labs reviewed.  Patient continues to be on ventilator.  D5W for 

high sodium and also potassium chloride intravenously as supplement given.  

Hemoglobin 8.4.  Continue to monitor electrolytes and renal parameters.


September 14: Labs reviewed.  Low potassium and high sodium noted.  Hemoglobin 

8.1 stable.  Aim to correct abnormal electrolyte.  Continue rest.  Will give 2 

boluses of D5W 500 cc.


September 13: Lab reviewed.  Abnormal electrolytes noted and addressed.


September 12: Labs reviewed.  Potassium supplement given.  Patient remains full 

code.  Continue per consultants.


September 11: Lab reviewed.  Electrolyte abnormalities addressed.  Continue per 

pulmonary and ID.


September 10: Lab reviewed.  Status unchanged.  Serum sodium 151 unchanged.  

Stable from renal standpoint of view.


September 9: Labs reviewed.  Status quo.  D5W 500 cc IV ordered.  Continue to 

monitor renal parameters.


September 8: Status quo.  Labs reviewed.  Overall condition unchanged.  Patient 

was transfused and hemoglobin higher.  Continue current management.  Patient 

remains full code.


September 7: Status quo.  Overall condition poor.  Very low albumin.  Edematous.

 Hypotensive.  Hemoglobin lower.  Anemia work-up ordered.  I favor transfusion 2

units of packed RBCs.  Patient remains full code.  I favor supportive care only.

 Will discuss.


September 6: Electrolyte abnormalities addressed.  Serum creatinine lower.  

Continue per current management.


September 5: Status unchanged.  Lab reviewed.  Serum potassium 2.7.  IV 

potassium chloride ordered.  Serum creatinine low at 1.6 stable.  Blood pressure

90s systolic


September 4: Status quo.  Labs reviewed.  Renal parameters stable.  Serum 

creatinine down to 1.6.  Medication list reviewed.  Continues to be on 

midodrine.  Continue per consultants.


September 3: Status quo.  Labs reviewed.  Electrolytes adjusted.  Serum 

creatinine down to 1.8.  Continue per consultants.


September 2: Status quo.  Labs reviewed.  Phosphorus supplement IV given.  Serum

creatinine 2.  Continue per consultants.


September 1: Requires less pressors.  Albumin bolus given.  1 dose of Lasix IV 

ordered as the patient severely edematous.  Patient serum albumin is very low.  

Continue per consultants.


August 31: Continues to be intubated.  Labs reviewed.  Serum creatinine 1.9 

unchanged.  Blood pressure more stable.  Off 1 of the pressors.  Continue to 

monitor renal parameters.  Continue per consultants.  Patient now on 

hydrocortisone 100 mg every 8 hours.  Will decrease IV fluid.  Normal saline 

down to 50 cc an hour.


August 30: Intubated.  Labs reviewed.  Creatinine 1.9 unchanged.  Continue same 

treatment plan.  Per consultants.  Overall poor prognosis since the patient 

remains on pressors and her pulmonary status is worsening.


August 29: Remains intubated.  Labs reviewed.  Creatinine 1.9.  Blood pressure 

systolic 90s.  Continue per consultants.


August 28: Remains intubated.  Labs reviewed.  Serum creatinine lower to 2.  

Vancomycin level lower.  Remains hypotensive on pressors.  Will increase 

midodrine to 10 mg every 8 hours.  Continue per consultants.  Continue to 

monitor renal parameters.


August 27: Patient now in ICU.  Intubated.  On pressors.  Labs reviewed.  Will 

increase midodrine.  Aim to keep blood pressure over 100 systolic.  Will give 

albumin bolus.  Will check vancomycin level which was elevated when checked 

previously on August 24.  Will monitor renal parameters.  Continue per 

consultants.





Subjective


ROS Limited/Unobtainable:  Yes





Objective


Objective





Last 24 Hour Vital Signs








  Date Time  Temp Pulse Resp B/P (MAP) Pulse Ox O2 Delivery O2 Flow Rate FiO2


 


10/5/20 12:00 98.8 61 24 106/45 (65) 98   


 


10/5/20 12:00        85


 


10/5/20 11:00  82 27 114/46 (68) 95   


 


10/5/20 10:00  69 25 98/47 (64) 100   


 


10/5/20 09:00  70 24 98/82 (87) 99   


 


10/5/20 09:00        85


 


10/5/20 08:35        75


 


10/5/20 08:00        100


 


10/5/20 08:00 99.0 69 18 99/71 (80) 100   


 


10/5/20 07:25  71 24     100


 


10/5/20 07:00  66 27 97/54 (68) 99   


 


10/5/20 06:00  67 18 113/87 (96) 99   


 


10/5/20 05:00  64 21 79/62 (68) 100   


 


10/5/20 04:00      Mechanical Ventilator  





      Mechanical Ventilator  


 


10/5/20 04:00 98.8 73 19 104/74 (84) 89   


 


10/5/20 04:00        100


 


10/5/20 04:00  59      


 


10/5/20 03:36  60 20 113/87 95   


 


10/5/20 03:30  73 24     70


 


10/5/20 03:06  74 26 104/60 95   


 


10/5/20 03:00  71 27 96/51 (66) 94   


 


10/5/20 02:00  67 29 116/62 (80) 99   


 


10/5/20 01:00  67 30 132/82 (99) 98   


 


10/5/20 00:00      Mechanical Ventilator  





      Mechanical Ventilator  


 


10/5/20 00:00 98.4 59 24 124/66 (85) 98   


 


10/5/20 00:00  58      


 


10/5/20 00:00        70


 


10/4/20 23:00  56 22 100/76 (84) 92   


 


10/4/20 22:36  64 24     70


 


10/4/20 22:00  60 24 93/56 (68) 90   


 


10/4/20 21:00  60 27 107/64 (78) 99   


 


10/4/20 20:00      Mechanical Ventilator  





      Mechanical Ventilator  


 


10/4/20 20:00 98.0 54 24 106/81 (89) 98   


 


10/4/20 20:00  59      


 


10/4/20 19:30  58 24     70


 


10/4/20 19:00  57 31 98/62 (74) 99   


 


10/4/20 18:00  65 23 104/60 (75) 97   


 


10/4/20 17:00 98.6 82 29 121/110 (114) 97   


 


10/4/20 17:00        70


 


10/4/20 16:00  100 29 110/61 (77) 96   


 


10/4/20 16:00        70


 


10/4/20 16:00      Mechanical Ventilator  





      Mechanical Ventilator  


 


10/4/20 15:27  68      


 


10/4/20 15:00  75 25     70


 


10/4/20 15:00  66 27 127/68 (87) 97   


 


10/4/20 14:00  65 28 147/81 (103) 96   


 


10/4/20 13:00  48 24 116/59 (78) 97   

















Intake and Output  


 


 10/4/20 10/5/20





 19:00 07:00


 


Intake Total 1410 ml 1040 ml


 


Output Total 635 ml 730 ml


 


Balance 775 ml 310 ml


 


  


 


Free Water 100 ml 90 ml


 


IV Total 650 ml 350 ml


 


Tube Feeding 600 ml 600 ml


 


Other 60 ml 


 


Output Urine Total 635 ml 730 ml


 


# Bowel Movements 1 3








Laboratory Tests


10/4/20 17:47: POC Whole Blood Glucose 149H


10/4/20 20:55: POC Whole Blood Glucose 165H


10/5/20 06:25: 


White Blood Count 12.3#H, Red Blood Count 3.21L, Hemoglobin 10.3L, Hematocrit 

31.0L, Mean Corpuscular Volume 97, Mean Corpuscular Hemoglobin 32.0H, Mean 

Corpuscular Hemoglobin Concent 33.1, Red Cell Distribution Width 17.4H, Platelet

Count 108L, Mean Platelet Volume 7.8, Neutrophils (%) (Auto) 77.4H, Lymphocytes 

(%) (Auto) 19.9L, Monocytes (%) (Auto) 1.9, Eosinophils (%) (Auto) 0.2, 

Basophils (%) (Auto) 0.6, Prothrombin Time 12.6H, Prothromb Time International 

Ratio 1.2H, Activated Partial Thromboplast Time 24, Sodium Level 146H, Potassium

Level 3.7, Chloride Level 113H, Carbon Dioxide Level 28, Anion Gap 5, Blood Urea

Nitrogen 17, Creatinine 0.7, Estimat Glomerular Filtration Rate > 60, Glucose 

Level 174H, Calcium Level 7.7L, Phosphorus Level 1.8L, Magnesium Level 2.0, 

Total Bilirubin 0.6, Aspartate Amino Transf (AST/SGOT) 27, Alanine 

Aminotransferase (ALT/SGPT) 21, Alkaline Phosphatase 58, Total Protein 5.0L, 

Albumin 1.4L, Globulin 3.6, Albumin/Globulin Ratio 0.4L


10/5/20 08:18: 


Arterial Blood pH 7.533H, Arterial Blood Partial Pressure CO2 33.5L, Arterial 

Blood Partial Pressure O2 283.3H, Arterial Blood HCO3 27.6H, Arterial Blood 

Oxygen Saturation 99.0, Arterial Blood Base Excess 4.9H, Cole Test Positive


10/5/20 12:05: POC Whole Blood Glucose 143H


Height (Feet):  5


Height (Inches):  3.00


Weight (Pounds):  172


EENT:  other - Intubated on ventilator


Cardiovascular:  normal rate


Respiratory/Chest:  decreased breath sounds


Abdomen:  soft











Lam Nino MD             Oct 5, 2020 12:34

## 2020-10-05 NOTE — NUR
SI:    RESP FAILURE ETT/VENT SUPPORT,LEUKOCYTOSIS

T. 99.0 HR 69 RR 18 B/P 98/82

AC 24  FIO2 100%

WBC 12.3  PT 12.6 INR 1.2 PTT 24

CXR=Worsening, small left pleural effusion.





IS:    IVF NS @ 50ML/HR

PROTONIX IV

SOLU CORTEF IV

*********ICU STATUS******

## 2020-10-05 NOTE — NUR
NURSE HAND-OFF REPORT: 



Latest Vital Signs: Temperature 98.8 , Pulse 63 , B/P 101 /53 , Respiratory Rate 26 , O2 SAT 
99 , Mechanical Ventilator, FiO2 75%.  

Vital Sign Comment: stable



EKG Rhythm: Sinus Rhythm

Rhythm change?: N 

MD Notified?:n/a

MD Response: n/a



Latest Momin Fall Score: 70  

Fall Risk: High Risk 

Safety Measures: Call light Within Reach, Bed Alarm Zone 1, Side Rails Side Rails x3, Bed 
position Low and Locked.

Fall Precautions: 

Yellow Socks

Door Sign

Patient Fall Education



Report given to Chasity Nichole, Charge RN.

## 2020-10-05 NOTE — NUR
NURSE NOTES:

Received patient orally intubated- no response to verbal stimulation but withdraws to 
stimulation.V/S stable , cardiac monitor shows SR no ectopy noted. No resp. distress noted. 
Patient getting Vital AF 1.2 at 50 cc/hr via GT. Thora vent in place @ left upper chest 
connected to pleuravac with 20 cm H2o suction

## 2020-10-05 NOTE — INFECTIOUS DISEASES PROG NOTE
Assessment/Plan





ASSESSMENT:


sp code blue 8/26


Septic Shock; SP


Fever, recurrent- low grade 


Leukocytosis; persistent/fluctuating, SP- mild recurrent


     -9/19 Bcx Neg


   -9/9 u/a no pyuria


   -9/8 Bcx NTD (Picc line)


   -9/6 Bcx NTD


            ucx Neg


             sp cx C. parapsilopsis


  -8/26 u/a no pyuria





Pneumonia.- COVID 19 neg x3


   Acute hypoxic resp failure on VM> NRB 15l 100%; hypoxic on ABG> now VDRF 

8/26- Fio2 80% >100% 8/28> 60% 9/1 >80% 9/2   >95% 9/10 >90% 9/14 >60% 9/15


R tension Pneumothroax sp CT 9/21


      10/5 CXR: Extensive bilateral airspace opacities, right greater than left,

 consistent with multifocal infiltrate worse pulmonary edema.  This is  similar 

in appearance to the prior study. Worsening, small left pleural effusion.  This 

may be secondary to  redistribution as the right-sided pleural effusion has 

mildly improved. 


       -9/22 CXR: Interim complete reexpansion of right lung without evidence of

residual pneumothorax. Bilateral diffuse and extensive infiltrates. Markedly 

improved chest wall subcutaneous emphysema


       -9/21 CXR: Interim reexpansion of previously demonstrated right 

pneumothorax, status post large bore chest tube placement.


      -9/14 CXR: Improved aeration of both lungs.


       -9/5 CXR:Small bilateral pleural effusions with minor edema.  Stable 

edema with  mild worsening in the degree of pleural effusion on the right.


         -9/1 sp cx Neg


         8/31 CXR: Extensive bilateral interstitial and airspace disease appears

similar to the prior exam. Moderate to large bilateral pleural effusions appear 

unchanged.


   8/28 CXR: Increasing left upper lobe dense consolidation and likely 

increasing bilateral pleural fluid. Persistent diffuse dense consolidation 

elsewhere


            -8/26 sp cx normal resp lilliam


             -8/25 CXR: Increased atelectasis of the right lung, since prior 

exam of 3 days earlier. New or increased right pleural effusion. Increased left 

basilar consolidation and/or pleural fluid 


          -COVID Rapid PCR neg 8/23, 8/23, 8/26


          -8/22 spc x Group G strep


          -8/22 CXR:   Reduced lung volumes.  Patchy bilateral predominantly 

interstitial  pulmonary opacities.  Could be from edema and/or pneumonia.  There

is a broader differential.


 


        -legionella ag urine, blasto ab, Histo ab, HIV ab screen, JOY, ANCA neg





Persistent, high grade bacteremia-


     -8/22 Bcx 4/4 sets S. haemolyticus; 8/23 Bcx 3/4 S/ epi; 8/27 Bcx 1.4 S. 

warnerri; 8/29 Bcx Neg


     -2d echo: no vegetaions seen





          ua/ wbc 10-15, nit neg, leuk +1; ucx Neg





DAVID; 


 -supratherapeutic vanco levels





-Seizure disorder.


- Hypothyroidism.


- Down syndrome.





History of PEG tube placement.


[facility] resident








PLAN:


Monitor off abx unless febrile, increasing WBC and/or HD unstable





          9/14 SP Meropenem #18, IV Amikacin #7


          9/4/20 SP Daptomycin #11


          9/2 SP MIcafungin #7, Linezolid #5


   8/28 SP Azithromycin #7/7


   8/26 SP Ceftriaxone #2


   8/25 SP IV Vancomycin #4, Zosyn #4


   8/22 SP Cefepime x1, Flagyl x1





- Monitor CBC, BMP.


.f/u cx


- COVID neg x3


- Monitor chest x-ray.


- Monitor the patient's clinical course and labs.  Based on those, we will do 

further recommendation.


-f/u Fungitell, asp ag, flow cytometry


-poor prognosis


- u/a, ucx, sp cx








Thank you, Dr. Cherry, for allowing me to participate in the care of


this patient.  I will follow the patient with you at this


hospitalization.








Discussed with RN





Subjective


Allergies:  


Coded Allergies:  


     No Known Allergies (Unverified , 1/8/19)


afebrile


remains intubated; Fio2 85%


mild leukocytosis





Objective





Last 24 Hour Vital Signs








  Date Time  Temp Pulse Resp B/P (MAP) Pulse Ox O2 Delivery O2 Flow Rate FiO2


 


10/5/20 12:00 98.8 61 24 106/45 (65) 98   


 


10/5/20 12:00        85


 


10/5/20 11:00  82 27 114/46 (68) 95   


 


10/5/20 10:00  69 25 98/47 (64) 100   


 


10/5/20 09:00  70 24 98/82 (87) 99   


 


10/5/20 09:00        85


 


10/5/20 08:35        75


 


10/5/20 08:00        100


 


10/5/20 08:00 99.0 69 18 99/71 (80) 100   


 


10/5/20 07:25  71 24     100


 


10/5/20 07:00  66 27 97/54 (68) 99   


 


10/5/20 06:00  67 18 113/87 (96) 99   


 


10/5/20 05:00  64 21 79/62 (68) 100   


 


10/5/20 04:00      Mechanical Ventilator  





      Mechanical Ventilator  


 


10/5/20 04:00 98.8 73 19 104/74 (84) 89   


 


10/5/20 04:00        100


 


10/5/20 04:00  59      


 


10/5/20 03:36  60 20 113/87 95   


 


10/5/20 03:30  73 24     70


 


10/5/20 03:06  74 26 104/60 95   


 


10/5/20 03:00  71 27 96/51 (66) 94   


 


10/5/20 02:00  67 29 116/62 (80) 99   


 


10/5/20 01:00  67 30 132/82 (99) 98   


 


10/5/20 00:00      Mechanical Ventilator  





      Mechanical Ventilator  


 


10/5/20 00:00 98.4 59 24 124/66 (85) 98   


 


10/5/20 00:00  58      


 


10/5/20 00:00        70


 


10/4/20 23:00  56 22 100/76 (84) 92   


 


10/4/20 22:36  64 24     70


 


10/4/20 22:00  60 24 93/56 (68) 90   


 


10/4/20 21:00  60 27 107/64 (78) 99   


 


10/4/20 20:00      Mechanical Ventilator  





      Mechanical Ventilator  


 


10/4/20 20:00 98.0 54 24 106/81 (89) 98   


 


10/4/20 20:00  59      


 


10/4/20 19:30  58 24     70


 


10/4/20 19:00  57 31 98/62 (74) 99   


 


10/4/20 18:00  65 23 104/60 (75) 97   


 


10/4/20 17:00 98.6 82 29 121/110 (114) 97   


 


10/4/20 17:00        70


 


10/4/20 16:00  100 29 110/61 (77) 96   


 


10/4/20 16:00        70


 


10/4/20 16:00      Mechanical Ventilator  





      Mechanical Ventilator  


 


10/4/20 15:27  68      


 


10/4/20 15:00  75 25     70


 


10/4/20 15:00  66 27 127/68 (87) 97   


 


10/4/20 14:00  65 28 147/81 (103) 96   


 


10/4/20 13:00  48 24 116/59 (78) 97   








Height (Feet):  5


Height (Inches):  3.00


Weight (Pounds):  172


HEENT:  . ETT in place


NECK:  No lymphadenopathy.


CHEST:  Coarse breathing sounds.


HEART:  S1 and S2.


ABDOMEN:  Soft.  PEG tube in place.


EXTREMITIES:  No cyanosis at this time .


SKIN: no rash





Laboratory Tests








Test


 10/4/20


17:47 10/4/20


20:55 10/5/20


06:25 10/5/20


08:18


 


POC Whole Blood Glucose


 149 MG/DL


()  H 165 MG/DL


()  H 


 





 


White Blood Count


 


 


 12.3 K/UL


(4.8-10.8)  #H 





 


Red Blood Count


 


 


 3.21 M/UL


(4.20-5.40)  L 





 


Hemoglobin


 


 


 10.3 G/DL


(12.0-16.0)  L 





 


Hematocrit


 


 


 31.0 %


(37.0-47.0)  L 





 


Mean Corpuscular Volume   97 FL (80-99)   


 


Mean Corpuscular Hemoglobin


 


 


 32.0 PG


(27.0-31.0)  H 





 


Mean Corpuscular Hemoglobin


Concent 


 


 33.1 G/DL


(32.0-36.0) 





 


Red Cell Distribution Width


 


 


 17.4 %


(11.6-14.8)  H 





 


Platelet Count


 


 


 108 K/UL


(150-450)  L 





 


Mean Platelet Volume


 


 


 7.8 FL


(6.5-10.1) 





 


Neutrophils (%) (Auto)


 


 


 77.4 %


(45.0-75.0)  H 





 


Lymphocytes (%) (Auto)


 


 


 19.9 %


(20.0-45.0)  L 





 


Monocytes (%) (Auto)


 


 


 1.9 %


(1.0-10.0) 





 


Eosinophils (%) (Auto)


 


 


 0.2 %


(0.0-3.0) 





 


Basophils (%) (Auto)


 


 


 0.6 %


(0.0-2.0) 





 


Prothrombin Time


 


 


 12.6 SEC


(9.30-11.50)  H 





 


Prothromb Time International


Ratio 


 


 1.2 (0.9-1.1)


H 





 


Activated Partial


Thromboplast Time 


 


 24 SEC (23-33)


 





 


Sodium Level


 


 


 146 MMOL/L


(136-145)  H 





 


Potassium Level


 


 


 3.7 MMOL/L


(3.5-5.1) 





 


Chloride Level


 


 


 113 MMOL/L


()  H 





 


Carbon Dioxide Level


 


 


 28 MMOL/L


(21-32) 





 


Anion Gap


 


 


 5 mmol/L


(5-15) 





 


Blood Urea Nitrogen


 


 


 17 mg/dL


(7-18) 





 


Creatinine


 


 


 0.7 MG/DL


(0.55-1.30) 





 


Estimat Glomerular Filtration


Rate 


 


 > 60 mL/min


(>60) 





 


Glucose Level


 


 


 174 MG/DL


()  H 





 


Calcium Level


 


 


 7.7 MG/DL


(8.5-10.1)  L 





 


Phosphorus Level


 


 


 1.8 MG/DL


(2.5-4.9)  L 





 


Magnesium Level


 


 


 2.0 MG/DL


(1.8-2.4) 





 


Total Bilirubin


 


 


 0.6 MG/DL


(0.2-1.0) 





 


Aspartate Amino Transf


(AST/SGOT) 


 


 27 U/L (15-37)


 





 


Alanine Aminotransferase


(ALT/SGPT) 


 


 21 U/L (12-78)


 





 


Alkaline Phosphatase


 


 


 58 U/L


() 





 


Total Protein


 


 


 5.0 G/DL


(6.4-8.2)  L 





 


Albumin


 


 


 1.4 G/DL


(3.4-5.0)  L 





 


Globulin   3.6 g/dL   


 


Albumin/Globulin Ratio


 


 


 0.4 (1.0-2.7)


L 





 


Arterial Blood pH


 


 


 


 7.533


(7.350-7.450)


 


Arterial Blood Partial


Pressure CO2 


 


 


 33.5 mmHg


(35.0-45.0)  L


 


Arterial Blood Partial


Pressure O2 


 


 


 283.3 mmHg


(75.0-100.0)  H


 


Arterial Blood HCO3


 


 


 


 27.6 mmol/L


(22.0-26.0)  H


 


Arterial Blood Oxygen


Saturation 


 


 


 99.0 %


()


 


Arterial Blood Base Excess    4.9 (-2-2)  H


 


Cole Test    Positive  


 


Test


 10/5/20


12:05 


 


 





 


POC Whole Blood Glucose


 143 MG/DL


()  H 


 


 














Current Medications








 Medications


  (Trade)  Dose


 Ordered  Sig/Didi


 Route


 PRN Reason  Start Time


 Stop Time Status Last Admin


Dose Admin


 


 Acetaminophen


  (Tylenol)  650 mg  Q4H  PRN


 GT


 For Pain  9/23/20 17:15


 10/23/20 17:14  10/2/20 17:46





 


 Chlorhexidine


 Gluconate


  (Beatrice-Hex 2%)  1 applic  DAILY@2000


 TOPIC


   8/31/20 20:00


 11/29/20 19:59  10/4/20 20:04





 


 Clotrimazole


  (Lotrimin)  1 applic  Q12HR


 TOPIC


   8/30/20 13:00


 11/28/20 12:59  10/5/20 08:41





 


 Dextrose


  (Dextrose 50%)  25 ml  Q30M  PRN


 IV


 Hypoglycemia  8/26/20 11:30


 11/20/20 11:29   





 


 Dextrose


  (Dextrose 50%)  50 ml  Q30M  PRN


 IV


 Hypoglycemia  8/26/20 11:30


 11/20/20 11:29   





 


 Hydrocortisone


  (Solu-CORTEF)  50 mg  EVERY 12  HOURS


 IV


   10/2/20 21:00


 12/27/20 20:59  10/5/20 08:41





 


 Insulin Aspart


  (NovoLOG)    Q6HR


 SUBQ


   9/18/20 12:00


 12/17/20 11:59  10/5/20 12:09





 


 Insulin Detemir


  (Levemir)  10 units  Q12HR


 SUBQ


   9/18/20 10:00


 12/17/20 09:59  10/5/20 08:47





 


 Levothyroxine


 Sodium


  (Synthroid)  75 mcg  DAILY@0630


 GT


   9/30/20 06:30


 10/30/20 06:29  10/5/20 06:57





 


 Loperamide HCl


  (Imodium)  2 mg  Q6H  PRN


 NG


 Diarrhea  9/16/20 10:30


 10/16/20 10:29  9/23/20 11:41





 


 Lorazepam


  (Ativan 2mg/ml


 1ml)  2 mg  Q4H  PRN


 IV


 For Anxiety  10/5/20 00:45


 10/12/20 00:44  10/5/20 03:06





 


 Pantoprazole


  (Protonix)  40 mg  EVERY 12  HOURS


 IVP


   9/28/20 21:00


 10/28/20 20:59  10/5/20 08:41





 


 Potassium Chloride


  (K-Dur)  40 meq  EVERY 12  HOURS


 GT


   9/26/20 21:00


 12/19/20 12:14  10/5/20 08:41





 


 Sodium Chloride  1,000 ml @ 


 50 mls/hr  Q20H


 IV


   10/4/20 11:00


 11/3/20 10:59  10/5/20 06:20




















Rema Gomes M.D.             Oct 5, 2020 12:36

## 2020-10-05 NOTE — NUR
NURSE NOTES:

Turned q 2hrs prn with good skin care done. Suctioned Tn frothy secretions lg in amt. HOB 
kept elevated. Watch for any resp distress.

## 2020-10-05 NOTE — EMERGENCY ROOM REPORT
History of Present Illness


General


Chief Complaint:  Dyspnea/Respdistress


Source:  Medical Record, PMD





Present Illness


Allergies:  


Coded Allergies:  


     No Known Allergies (Unverified , 1/8/19)





COVID-19 Screening


Contact w/high risk pt:  No


Experienced COVID-19 symptoms?:  No


COVID-19 Testing performed PTA:  Yes


COVID-19 Screening:  Negative COVID-19


COVID-19 Testing Source:  08/10/20





Nursing Documentation-PMH


Past Medical History:  No History, Except For


Hx Cardiac Problems:  No - PVD


Hx Diabetes:  No - Hypothyroid


Hx Cancer:  No


Hx Gastrointestinal Problems:  No - gastrostomy


Hx Neurological Problems:  Yes - down syndrome


Hx Seizures:  Yes


Hx Speech Problem:  Yes





Physical Exam





Vital Signs








  Date Time  Temp Pulse Resp B/P (MAP) Pulse Ox O2 Delivery O2 Flow Rate FiO2


 


10/1/20 07:00 98.0 56 23 120/68 (85) 94   


 


10/1/20 07:06        60


 


10/1/20 08:00      Mechanical Ventilator  





      Mechanical Ventilator  





      Mechanical Ventilator  











Procedures


Critical Care Time


Critical Care Time


i.  I feel this is a highly complex case requiring extensive working including 

EKG/Rhythm strip, Xray/CT/US, Blood/urine lab work, repeat exams while in ED, 


and administration of strong opiates/narcotics for pain control, admission to 

hospital or close patient follow up.  





Total time: 30 min bedside evaluation and treatment excludes procedures (EKG). 


Reason for critical care: respiratory failure. malfunctioning ET tube


Possible complications: hypotension, hypertension, MI, shock, arrhythmias, 

metabolic acidosis, end organ damage, respiratory failure. 


Interventions: Intubation


Course: Patient not getting adequate tidal volumes.  Likely bit through her ET 

tube.  History of Down syndrome.  Using bougie I exchange the ET tube.  Chest x-

ray confirms ET tube in place.  Significant ARDS.


Consultations: nursing staff, EMS, family 


Performed by: Dr Oliveira


Tolerated well condition = critical





j.  because of unstable vital signs this patient had a condition that could 

potentially threaten life or limb.  I feel this is a critical patient who 

required my full attention while patient was considered critical.  Total 

Critical Care Time excluding procedures was greater than 35 minutes





Intubation


Intubation :  


   Consent:  Emergent


   Intubation Method:  orotracheal


   Tube Size (cm):  7.5


   Medications:  Etomidate, Rocuronium


   Breath Sounds after Intubation:  equal


   Intubation Complications:  no complications


   Post Intubation Xray:  Yes


   Attempts:  One


   Patient Tolerated:  Well


   Complications:  None





Medical Decision Making


Diagnostic Impression:  


   Primary Impression:  


   ARDS (adult respiratory distress syndrome)


   Additional Impressions:  


   HCAP (healthcare-associated pneumonia)


   Septic shock


   Respiratory failure requiring intubation


ER Course


Called to the ICU to evaluate this patient.  History of Down syndrome.  ARDS.  

Intubated.  Patient is not getting adequate tidal volumes through her ET tube.  

Likely has bitten the balloon.  Has done this previously.  This is numerous 

times the ET tube has been replaced.  Patient is scheduled for trach this week. 

Using bougie I exchange the ET tube without complication.  Patient given etomid

ate and rocuronium.  Chest x-ray confirms ET tube placement.  Significant ARDS. 

Ativan ordered as needed





Last Vital Signs








  Date Time  Temp Pulse Resp B/P (MAP) Pulse Ox O2 Delivery O2 Flow Rate FiO2


 


10/5/20 02:00  67 29 116/62 (80) 99   


 


10/5/20 00:00      Mechanical Ventilator  





      Mechanical Ventilator  


 


10/5/20 00:00 98.4       


 


10/4/20 22:36        70








Status:  improved


Disposition:  ADMITTED AS INPATIENT


Condition:  Critical


Referrals:  


NON PHYSICIAN (PCP)











Gómez Oliveira MD             Oct 5, 2020 02:50

## 2020-10-05 NOTE — SURGERY PROGRESS NOTE
Surgery Progress Note


Subjective


Additional Comments


ill appearing


cxr noted


fi02 85%


ct in place on suction 


labs noted


wbc





Objective





Last 24 Hour Vital Signs








  Date Time  Temp Pulse Resp B/P (MAP) Pulse Ox O2 Delivery O2 Flow Rate FiO2


 


10/5/20 14:00  63 30 90/47 (61) 98   


 


10/5/20 13:50        75


 


10/5/20 13:00  59 34 95/42 (59) 100   


 


10/5/20 12:00  61      


 


10/5/20 12:00 98.8 61 24 106/45 (65) 98   


 


10/5/20 12:00        85


 


10/5/20 12:00      Mechanical Ventilator  





      Mechanical Ventilator  





      Mechanical Ventilator  


 


10/5/20 11:15  63 24     75


 


10/5/20 11:00  82 27 114/46 (68) 95   


 


10/5/20 10:00  69 25 98/47 (64) 100   


 


10/5/20 09:00  70 24 98/82 (87) 99   


 


10/5/20 09:00        85


 


10/5/20 08:00        100


 


10/5/20 08:00  71      


 


10/5/20 08:00      Mechanical Ventilator  





      Mechanical Ventilator  





      Mechanical Ventilator  


 


10/5/20 08:00 99.0 69 18 99/71 (80) 100   


 


10/5/20 07:25  71 24     100


 


10/5/20 07:00  66 27 97/54 (68) 99   


 


10/5/20 06:00  67 18 113/87 (96) 99   


 


10/5/20 05:00  64 21 79/62 (68) 100   


 


10/5/20 04:00      Mechanical Ventilator  





      Mechanical Ventilator  


 


10/5/20 04:00 98.8 73 19 104/74 (84) 89   


 


10/5/20 04:00        100


 


10/5/20 04:00  59      


 


10/5/20 03:36  60 20 113/87 95   


 


10/5/20 03:30  73 24     70


 


10/5/20 03:06  74 26 104/60 95   


 


10/5/20 03:00  71 27 96/51 (66) 94   


 


10/5/20 02:00  67 29 116/62 (80) 99   


 


10/5/20 01:00  67 30 132/82 (99) 98   


 


10/5/20 00:00      Mechanical Ventilator  





      Mechanical Ventilator  


 


10/5/20 00:00 98.4 59 24 124/66 (85) 98   


 


10/5/20 00:00  58      


 


10/5/20 00:00        70


 


10/4/20 23:00  56 22 100/76 (84) 92   


 


10/4/20 22:36  64 24     70


 


10/4/20 22:00  60 24 93/56 (68) 90   


 


10/4/20 21:00  60 27 107/64 (78) 99   


 


10/4/20 20:00      Mechanical Ventilator  





      Mechanical Ventilator  


 


10/4/20 20:00 98.0 54 24 106/81 (89) 98   


 


10/4/20 20:00  59      


 


10/4/20 19:30  58 24     70


 


10/4/20 19:00  57 31 98/62 (74) 99   


 


10/4/20 18:00  65 23 104/60 (75) 97   


 


10/4/20 17:00 98.6 82 29 121/110 (114) 97   


 


10/4/20 17:00        70


 


10/4/20 16:00  100 29 110/61 (77) 96   


 


10/4/20 16:00        70


 


10/4/20 16:00      Mechanical Ventilator  





      Mechanical Ventilator  


 


10/4/20 15:27  68      


 


10/4/20 15:00  75 25     70


 


10/4/20 15:00  66 27 127/68 (87) 97   








I&O











Intake and Output  


 


 10/4/20 10/5/20





 19:00 07:00


 


Intake Total 1410 ml 1073.3333 ml


 


Output Total 635 ml 730 ml


 


Balance 775 ml 343.3333 ml


 


  


 


Free Water 100 ml 90 ml


 


IV Total 650 ml 383.3333 ml


 


Tube Feeding 600 ml 600 ml


 


Other 60 ml 


 


Output Urine Total 635 ml 730 ml


 


# Bowel Movements 1 3








Drains:  other


Cardiovascular:  RSR


Respiratory:  decreased breath sounds


Abdomen:  soft, non-tender, present bowel sounds


Extremities:  no tenderness, no cyanosis





Laboratory Tests








Test


 10/4/20


17:47 10/4/20


20:55 10/5/20


06:25 10/5/20


08:18


 


POC Whole Blood Glucose


 149 MG/DL


()  H 165 MG/DL


()  H 


 





 


White Blood Count


 


 


 12.3 K/UL


(4.8-10.8)  #H 





 


Red Blood Count


 


 


 3.21 M/UL


(4.20-5.40)  L 





 


Hemoglobin


 


 


 10.3 G/DL


(12.0-16.0)  L 





 


Hematocrit


 


 


 31.0 %


(37.0-47.0)  L 





 


Mean Corpuscular Volume   97 FL (80-99)   


 


Mean Corpuscular Hemoglobin


 


 


 32.0 PG


(27.0-31.0)  H 





 


Mean Corpuscular Hemoglobin


Concent 


 


 33.1 G/DL


(32.0-36.0) 





 


Red Cell Distribution Width


 


 


 17.4 %


(11.6-14.8)  H 





 


Platelet Count


 


 


 108 K/UL


(150-450)  L 





 


Mean Platelet Volume


 


 


 7.8 FL


(6.5-10.1) 





 


Neutrophils (%) (Auto)


 


 


 77.4 %


(45.0-75.0)  H 





 


Lymphocytes (%) (Auto)


 


 


 19.9 %


(20.0-45.0)  L 





 


Monocytes (%) (Auto)


 


 


 1.9 %


(1.0-10.0) 





 


Eosinophils (%) (Auto)


 


 


 0.2 %


(0.0-3.0) 





 


Basophils (%) (Auto)


 


 


 0.6 %


(0.0-2.0) 





 


Prothrombin Time


 


 


 12.6 SEC


(9.30-11.50)  H 





 


Prothromb Time International


Ratio 


 


 1.2 (0.9-1.1)


H 





 


Activated Partial


Thromboplast Time 


 


 24 SEC (23-33)


 





 


Sodium Level


 


 


 146 MMOL/L


(136-145)  H 





 


Potassium Level


 


 


 3.7 MMOL/L


(3.5-5.1) 





 


Chloride Level


 


 


 113 MMOL/L


()  H 





 


Carbon Dioxide Level


 


 


 28 MMOL/L


(21-32) 





 


Anion Gap


 


 


 5 mmol/L


(5-15) 





 


Blood Urea Nitrogen


 


 


 17 mg/dL


(7-18) 





 


Creatinine


 


 


 0.7 MG/DL


(0.55-1.30) 





 


Estimat Glomerular Filtration


Rate 


 


 > 60 mL/min


(>60) 





 


Glucose Level


 


 


 174 MG/DL


()  H 





 


Calcium Level


 


 


 7.7 MG/DL


(8.5-10.1)  L 





 


Phosphorus Level


 


 


 1.8 MG/DL


(2.5-4.9)  L 





 


Magnesium Level


 


 


 2.0 MG/DL


(1.8-2.4) 





 


Total Bilirubin


 


 


 0.6 MG/DL


(0.2-1.0) 





 


Aspartate Amino Transf


(AST/SGOT) 


 


 27 U/L (15-37)


 





 


Alanine Aminotransferase


(ALT/SGPT) 


 


 21 U/L (12-78)


 





 


Alkaline Phosphatase


 


 


 58 U/L


() 





 


Total Protein


 


 


 5.0 G/DL


(6.4-8.2)  L 





 


Albumin


 


 


 1.4 G/DL


(3.4-5.0)  L 





 


Globulin   3.6 g/dL   


 


Albumin/Globulin Ratio


 


 


 0.4 (1.0-2.7)


L 





 


Arterial Blood pH


 


 


 


 7.533


(7.350-7.450)


 


Arterial Blood Partial


Pressure CO2 


 


 


 33.5 mmHg


(35.0-45.0)  L


 


Arterial Blood Partial


Pressure O2 


 


 


 283.3 mmHg


(75.0-100.0)  H


 


Arterial Blood HCO3


 


 


 


 27.6 mmol/L


(22.0-26.0)  H


 


Arterial Blood Oxygen


Saturation 


 


 


 99.0 %


()


 


Arterial Blood Base Excess    4.9 (-2-2)  H


 


Cole Test    Positive  


 


Test


 10/5/20


12:05 


 


 





 


POC Whole Blood Glucose


 143 MG/DL


()  H 


 


 














Plan


Problems:  


(1) Respiratory distress


Assessment & Plan:  Respiratory insufficiency requiring prolonged ventilator 

support.  Patient on minimal vent settings right now currently in stable but 

unfortunately not safe for extubation.  Tracheostomy is indicated recommended.  

I discussed case with pulmonology team ICU team medical teams.  Patient unable 

to make decisions and her current condition and given her history.  The care 

team has been contacted and will be reviewed for evaluation and consideration of

the tracheostomy.  If consented I think it is reasonable for tracheostomy given 

patient's current CODE STATUS care plan and goals of care.  Extubation his 

current status is potentially high risk for reintubation emergency complication.

 We will plan for tracheostomy if consent is obtained.  Thank you for let me 

participate patient's care will follow with recommendations





will plan for trach when ready


wean fi02 





(2) Encephalopathy chronic


(3) Dysphagia


(4) Down's syndrome


(5) Sepsis


(6) Acute respiratory failure


(7) Pneumonia


(8) Trisomy 21, Down syndrome


(9) DAVID (acute kidney injury)


(10) Bacteremia


(11) Hypokalemia


(12) Anemia


(13) Diarrhea


(14) Hypernatremia


(15) Pneumothorax


(16) Pneumothorax, right


Assessment & Plan:  right ptx.  s/p chest tube


tube removed 10/3





left ptx tube placed 10/2





(17) Cardiac arrest


(18) ARDS (adult respiratory distress syndrome)


(19) Septic shock


(20) HCAP (healthcare-associated pneumonia)


(21) Respiratory failure requiring intubation


(22) Seizure disorder


(23) Hypothyroidism











Benyamini,Jake                 Oct 5, 2020 14:49

## 2020-10-05 NOTE — DIAGNOSTIC IMAGING REPORT
EXAM:

  XR Chest, 1 View

 

CLINICAL HISTORY:

  S/P INTUB

 

TECHNIQUE:

  Frontal view of the chest.

 

COMPARISON:

Chest radiograph October 04, 2020

 

FINDINGS/IMPRESSION:

There is an endotracheal tube which terminates approximately 5 mm above 

the lisandro.

Left upper extremity PICC line terminates at the cavoatrial junction.

 

Extensive bilateral airspace opacities, right greater than left, 

consistent with multifocal infiltrate worse pulmonary edema.  This is 

similar in appearance to the prior study.

 

Worsening, small left pleural effusion.  This may be secondary to 

redistribution as the right-sided pleural effusion has mildly improved.

 

Cardiomegaly.

## 2020-10-05 NOTE — NUR
NURSE HAND-OFF REPORT: 



Latest Vital Signs: Temperature 98.8 , Pulse 66 , B/P 97 /54 , Respiratory Rate 27 , O2 SAT 
99 , Mechanical Ventilator, O2 Flow Rate 15.0 .  

Vital Sign Comment: 



EKG Rhythm: Sinus Rhythm

Rhythm change?: N 

MD Notified?: Y Jean-Pierre Carver MD Response: No New Orders Received



Latest Momin Fall Score: 70  

Fall Risk: High Risk 

Safety Measures: Call light Within Reach, Bed Alarm Zone 1, Side Rails Side Rails x3, Bed 
position Low and Locked.

Fall Precautions: 

Yellow Socks

Door Sign

Patient Fall Education



Report given to Bk TRAYLOR

## 2020-10-05 NOTE — NUR
NURSE NOTES:

Pt cleaned and repositioned. No distress noted. FiO2 now 85%. 

-------------------------------------------------------------------------------

Addendum: 10/05/20 at 1433 by Rylie Smith RN

-------------------------------------------------------------------------------

Late entry: Pt seen by Dr Nino.

## 2020-10-05 NOTE — GENERAL PROGRESS NOTE
Subjective


ROS Limited/Unobtainable:  No


Allergies:  


Coded Allergies:  


     No Known Allergies (Unverified , 1/8/19)





Objective





Last 24 Hour Vital Signs








  Date Time  Temp Pulse Resp B/P (MAP) Pulse Ox O2 Delivery O2 Flow Rate FiO2


 


10/5/20 08:00        100


 


10/5/20 07:00  66 27 97/54 (68) 99   


 


10/5/20 06:00  67 18 113/87 (96) 99   


 


10/5/20 05:00  64 21 79/62 (68) 100   


 


10/5/20 04:00      Mechanical Ventilator  





      Mechanical Ventilator  


 


10/5/20 04:00 98.8 73 19 104/74 (84) 89   


 


10/5/20 04:00        100


 


10/5/20 04:00  59      


 


10/5/20 03:36  60 20 113/87 95   


 


10/5/20 03:30  73 24     70


 


10/5/20 03:06  74 26 104/60 95   


 


10/5/20 03:00  71 27 96/51 (66) 94   


 


10/5/20 02:00  67 29 116/62 (80) 99   


 


10/5/20 01:00  67 30 132/82 (99) 98   


 


10/5/20 00:00      Mechanical Ventilator  





      Mechanical Ventilator  


 


10/5/20 00:00 98.4 59 24 124/66 (85) 98   


 


10/5/20 00:00  58      


 


10/5/20 00:00        70


 


10/4/20 23:00  56 22 100/76 (84) 92   


 


10/4/20 22:36  64 24     70


 


10/4/20 22:00  60 24 93/56 (68) 90   


 


10/4/20 21:00  60 27 107/64 (78) 99   


 


10/4/20 20:00      Mechanical Ventilator  





      Mechanical Ventilator  


 


10/4/20 20:00 98.0 54 24 106/81 (89) 98   


 


10/4/20 20:00  59      


 


10/4/20 19:30  58 24     70


 


10/4/20 19:00  57 31 98/62 (74) 99   


 


10/4/20 18:00  65 23 104/60 (75) 97   


 


10/4/20 17:00 98.6 82 29 121/110 (114) 97   


 


10/4/20 17:00        70


 


10/4/20 16:00  100 29 110/61 (77) 96   


 


10/4/20 16:00        70


 


10/4/20 16:00      Mechanical Ventilator  





      Mechanical Ventilator  


 


10/4/20 15:27  68      


 


10/4/20 15:00  75 25     70


 


10/4/20 15:00  66 27 127/68 (87) 97   


 


10/4/20 14:00  65 28 147/81 (103) 96   


 


10/4/20 13:00  48 24 116/59 (78) 97   


 


10/4/20 12:00      Mechanical Ventilator  





      Mechanical Ventilator  


 


10/4/20 12:00 98.9 47 24 104/58 (73) 97   


 


10/4/20 11:43  46      


 


10/4/20 11:20  52 25     70


 


10/4/20 11:00  47 24 107/57 (74) 96   


 


10/4/20 10:00  48 24 93/58 (70) 98   


 


10/4/20 09:00  54 25 114/63 (80) 97   


 


10/4/20 08:55        70


 


10/4/20 08:55        70

















Intake and Output  


 


 10/4/20 10/5/20





 19:00 07:00


 


Intake Total 1410 ml 1040 ml


 


Output Total 635 ml 730 ml


 


Balance 775 ml 310 ml


 


  


 


Free Water 100 ml 90 ml


 


IV Total 650 ml 350 ml


 


Tube Feeding 600 ml 600 ml


 


Other 60 ml 


 


Output Urine Total 635 ml 730 ml


 


# Bowel Movements 1 3








Laboratory Tests


10/4/20 17:47: POC Whole Blood Glucose 149H


10/4/20 20:55: POC Whole Blood Glucose 165H


10/5/20 06:25: 


White Blood Count 12.3#H, Red Blood Count 3.21L, Hemoglobin 10.3L, Hematocrit 31

.0L, Mean Corpuscular Volume 97, Mean Corpuscular Hemoglobin 32.0H, Mean 

Corpuscular Hemoglobin Concent 33.1, Red Cell Distribution Width 17.4H, Platelet

Count 108L, Mean Platelet Volume 7.8, Neutrophils (%) (Auto) 77.4H, Lymphocytes 

(%) (Auto) 19.9L, Monocytes (%) (Auto) 1.9, Eosinophils (%) (Auto) 0.2, 

Basophils (%) (Auto) 0.6, Prothrombin Time 12.6H, Prothromb Time International 

Ratio 1.2H, Activated Partial Thromboplast Time 24, Sodium Level 146H, Potassium

Level 3.7, Chloride Level 113H, Carbon Dioxide Level 28, Anion Gap 5, Blood Urea

Nitrogen 17, Creatinine 0.7, Estimat Glomerular Filtration Rate > 60, Glucose 

Level 174H, Calcium Level 7.7L, Phosphorus Level 1.8L, Magnesium Level 2.0, 

Total Bilirubin 0.6, Aspartate Amino Transf (AST/SGOT) 27, Alanine 

Aminotransferase (ALT/SGPT) [Pending], Alkaline Phosphatase 58, Total Protein 

5.0L, Albumin 1.4L, Globulin 3.6, Albumin/Globulin Ratio 0.4L


10/5/20 08:18: 


Arterial Blood pH 7.533H, Arterial Blood Partial Pressure CO2 33.5L, Arterial 

Blood Partial Pressure O2 283.3H, Arterial Blood HCO3 27.6H, Arterial Blood 

Oxygen Saturation 99.0, Arterial Blood Base Excess 4.9H, Cole Test Positive


Height (Feet):  5


Height (Inches):  3.00


Weight (Pounds):  172


General Appearance:  no apparent distress


EENT:  normal ENT inspection


Neck:  supple


Cardiovascular:  normal rate


Respiratory/Chest:  decreased breath sounds


Abdomen:  normal bowel sounds, non tender, soft


Extremities:  non-tender





Assessment/Plan


Status:  stable, not improved, unchanged


Assessment/Plan:


1. History of Down syndrome.


2. Dysphagia with G-tube.


3. Seizure disorder.


4. Hypothyroidism.


5. DAVID.


6. Pneumonia.


7. Sepsis.








fu H&H


prn blood transfusion to keep HGB above 7


ppi


GTF


hold GI procedures for now


pending possible Trach











Ted Nagy MD              Oct 5, 2020 08:37

## 2020-10-06 VITALS — DIASTOLIC BLOOD PRESSURE: 56 MMHG | SYSTOLIC BLOOD PRESSURE: 90 MMHG

## 2020-10-06 VITALS — DIASTOLIC BLOOD PRESSURE: 50 MMHG | SYSTOLIC BLOOD PRESSURE: 119 MMHG

## 2020-10-06 VITALS — DIASTOLIC BLOOD PRESSURE: 44 MMHG | SYSTOLIC BLOOD PRESSURE: 128 MMHG

## 2020-10-06 VITALS — SYSTOLIC BLOOD PRESSURE: 112 MMHG | DIASTOLIC BLOOD PRESSURE: 47 MMHG

## 2020-10-06 VITALS — DIASTOLIC BLOOD PRESSURE: 45 MMHG | SYSTOLIC BLOOD PRESSURE: 96 MMHG

## 2020-10-06 VITALS — SYSTOLIC BLOOD PRESSURE: 108 MMHG | DIASTOLIC BLOOD PRESSURE: 63 MMHG

## 2020-10-06 VITALS — DIASTOLIC BLOOD PRESSURE: 63 MMHG | SYSTOLIC BLOOD PRESSURE: 120 MMHG

## 2020-10-06 VITALS — SYSTOLIC BLOOD PRESSURE: 105 MMHG | DIASTOLIC BLOOD PRESSURE: 52 MMHG

## 2020-10-06 VITALS — SYSTOLIC BLOOD PRESSURE: 114 MMHG | DIASTOLIC BLOOD PRESSURE: 48 MMHG

## 2020-10-06 VITALS — DIASTOLIC BLOOD PRESSURE: 57 MMHG | SYSTOLIC BLOOD PRESSURE: 98 MMHG

## 2020-10-06 VITALS — SYSTOLIC BLOOD PRESSURE: 117 MMHG | DIASTOLIC BLOOD PRESSURE: 57 MMHG

## 2020-10-06 VITALS — SYSTOLIC BLOOD PRESSURE: 101 MMHG | DIASTOLIC BLOOD PRESSURE: 47 MMHG

## 2020-10-06 VITALS — SYSTOLIC BLOOD PRESSURE: 104 MMHG | DIASTOLIC BLOOD PRESSURE: 47 MMHG

## 2020-10-06 VITALS — SYSTOLIC BLOOD PRESSURE: 101 MMHG | DIASTOLIC BLOOD PRESSURE: 50 MMHG

## 2020-10-06 VITALS — SYSTOLIC BLOOD PRESSURE: 125 MMHG | DIASTOLIC BLOOD PRESSURE: 53 MMHG

## 2020-10-06 VITALS — DIASTOLIC BLOOD PRESSURE: 43 MMHG | SYSTOLIC BLOOD PRESSURE: 89 MMHG

## 2020-10-06 VITALS — SYSTOLIC BLOOD PRESSURE: 109 MMHG | DIASTOLIC BLOOD PRESSURE: 79 MMHG

## 2020-10-06 VITALS — SYSTOLIC BLOOD PRESSURE: 101 MMHG | DIASTOLIC BLOOD PRESSURE: 44 MMHG

## 2020-10-06 VITALS — SYSTOLIC BLOOD PRESSURE: 99 MMHG | DIASTOLIC BLOOD PRESSURE: 44 MMHG

## 2020-10-06 VITALS — SYSTOLIC BLOOD PRESSURE: 93 MMHG | DIASTOLIC BLOOD PRESSURE: 39 MMHG

## 2020-10-06 VITALS — SYSTOLIC BLOOD PRESSURE: 103 MMHG | DIASTOLIC BLOOD PRESSURE: 48 MMHG

## 2020-10-06 VITALS — SYSTOLIC BLOOD PRESSURE: 99 MMHG | DIASTOLIC BLOOD PRESSURE: 47 MMHG

## 2020-10-06 VITALS — DIASTOLIC BLOOD PRESSURE: 59 MMHG | SYSTOLIC BLOOD PRESSURE: 104 MMHG

## 2020-10-06 LAB
ADD MANUAL DIFF: YES
ALBUMIN SERPL-MCNC: 1.1 G/DL (ref 3.4–5)
ALBUMIN/GLOB SERPL: 0.3 {RATIO} (ref 1–2.7)
ALP SERPL-CCNC: 51 U/L (ref 46–116)
ALT SERPL-CCNC: 18 U/L (ref 12–78)
AST SERPL-CCNC: 20 U/L (ref 15–37)
BILIRUB SERPL-MCNC: 0.7 MG/DL (ref 0.2–1)
BUN SERPL-MCNC: 20 MG/DL (ref 7–18)
CALCIUM SERPL-MCNC: 6.9 MG/DL (ref 8.5–10.1)
CHLORIDE SERPL-SCNC: 112 MMOL/L (ref 98–107)
CO2 SERPL-SCNC: 29 MMOL/L (ref 21–32)
CREAT SERPL-MCNC: 0.6 MG/DL (ref 0.55–1.3)
ERYTHROCYTE [DISTWIDTH] IN BLOOD BY AUTOMATED COUNT: 18.1 % (ref 11.6–14.8)
GLOBULIN SER-MCNC: 3.2 G/DL
HCT VFR BLD CALC: 24.8 % (ref 37–47)
HGB BLD-MCNC: 8.2 G/DL (ref 12–16)
MCV RBC AUTO: 96 FL (ref 80–99)
PHOSPHATE SERPL-MCNC: 1.5 MG/DL (ref 2.5–4.9)
PLATELET # BLD: 89 K/UL (ref 150–450)
POTASSIUM SERPL-SCNC: 3.7 MMOL/L (ref 3.5–5.1)
RBC # BLD AUTO: 2.58 M/UL (ref 4.2–5.4)
SODIUM SERPL-SCNC: 144 MMOL/L (ref 136–145)
WBC # BLD AUTO: 9.6 K/UL (ref 4.8–10.8)

## 2020-10-06 RX ADMIN — HYDROCORTISONE SODIUM SUCCINATE SCH MG: 100 INJECTION, POWDER, FOR SOLUTION INTRAMUSCULAR; INTRAVENOUS at 09:21

## 2020-10-06 RX ADMIN — INSULIN DETEMIR SCH UNITS: 100 INJECTION, SOLUTION SUBCUTANEOUS at 09:45

## 2020-10-06 RX ADMIN — PANTOPRAZOLE SODIUM SCH MG: 40 INJECTION, POWDER, FOR SOLUTION INTRAVENOUS at 09:21

## 2020-10-06 RX ADMIN — INSULIN ASPART SCH UNITS: 100 INJECTION, SOLUTION INTRAVENOUS; SUBCUTANEOUS at 06:36

## 2020-10-06 RX ADMIN — INSULIN ASPART SCH UNITS: 100 INJECTION, SOLUTION INTRAVENOUS; SUBCUTANEOUS at 17:20

## 2020-10-06 RX ADMIN — PANTOPRAZOLE SODIUM SCH MG: 40 INJECTION, POWDER, FOR SOLUTION INTRAVENOUS at 21:14

## 2020-10-06 RX ADMIN — CHLORHEXIDINE GLUCONATE SCH APPLIC: 213 SOLUTION TOPICAL at 20:20

## 2020-10-06 RX ADMIN — HYDROCORTISONE SODIUM SUCCINATE SCH MG: 100 INJECTION, POWDER, FOR SOLUTION INTRAMUSCULAR; INTRAVENOUS at 21:14

## 2020-10-06 RX ADMIN — INSULIN ASPART SCH UNITS: 100 INJECTION, SOLUTION INTRAVENOUS; SUBCUTANEOUS at 12:00

## 2020-10-06 RX ADMIN — INSULIN DETEMIR SCH UNITS: 100 INJECTION, SOLUTION SUBCUTANEOUS at 21:28

## 2020-10-06 NOTE — INFECTIOUS DISEASES PROG NOTE
Assessment/Plan





ASSESSMENT:


sp code blue 8/26


Septic Shock; SP


Fever, recurrent- low grade -SP


Leukocytosis; recurrent; SP


     -9/19 Bcx Neg


   -9/9 u/a no pyuria


   -9/8 Bcx Neg(Picc line)


   -9/6 Bcx Neg


            ucx Neg


             sp cx C. parapsilopsis


  -8/26 u/a no pyuria





Pneumonia.- COVID 19 neg x3


   Acute hypoxic resp failure on VM> NRB 15l 100%; hypoxic on ABG> now VDRF 

8/26- Fio2 80% >100% 8/28> 60% 9/1 >80% 9/2   >95% 9/10 >90% 9/14 >60% 9/15


R tension Pneumothroax sp CT 9/21


      10/5 CXR: Extensive bilateral airspace opacities, right greater than left,

 consistent with multifocal infiltrate worse pulmonary edema.  This is  similar 

in appearance to the prior study. Worsening, small left pleural effusion.  This 

may be secondary to  redistribution as the right-sided pleural effusion has 

mildly improved. 


       -9/22 CXR: Interim complete reexpansion of right lung without evidence of

residual pneumothorax. Bilateral diffuse and extensive infiltrates. Markedly 

improved chest wall subcutaneous emphysema


       -9/21 CXR: Interim reexpansion of previously demonstrated right 

pneumothorax, status post large bore chest tube placement.


      -9/14 CXR: Improved aeration of both lungs.


       -9/5 CXR:Small bilateral pleural effusions with minor edema.  Stable 

edema with  mild worsening in the degree of pleural effusion on the right.


         -9/1 sp cx Neg


         8/31 CXR: Extensive bilateral interstitial and airspace disease appears

similar to the prior exam. Moderate to large bilateral pleural effusions appear 

unchanged.


   8/28 CXR: Increasing left upper lobe dense consolidation and likely 

increasing bilateral pleural fluid. Persistent diffuse dense consolidation 

elsewhere


            -8/26 sp cx normal resp lilliam


             -8/25 CXR: Increased atelectasis of the right lung, since prior 

exam of 3 days earlier. New or increased right pleural effusion. Increased left 

basilar consolidation and/or pleural fluid 


          -COVID Rapid PCR neg 8/23, 8/23, 8/26


          -8/22 spc x Group G strep


          -8/22 CXR:   Reduced lung volumes.  Patchy bilateral predominantly 

interstitial  pulmonary opacities.  Could be from edema and/or pneumonia.  There

is a broader differential.


 


        -legionella ag urine, blasto ab, Histo ab, HIV ab screen, JOY, ANCA neg





Persistent, high grade bacteremia-


     -8/22 Bcx 4/4 sets S. haemolyticus; 8/23 Bcx 3/4 S/ epi; 8/27 Bcx 1.4 S. 

warnerri; 8/29 Bcx Neg


     -2d echo: no vegetaions seen





          ua/ wbc 10-15, nit neg, leuk +1; ucx Neg





DAVID; 


 -supratherapeutic vanco levels





-Seizure disorder.


- Hypothyroidism.


- Down syndrome.





History of PEG tube placement.


[facility]








PLAN:


Monitor off abx unless febrile, increasing WBC and/or HD unstable





          9/14 SP Meropenem #18, IV Amikacin #7


          9/4/20 SP Daptomycin #11


          9/2 SP MIcafungin #7, Linezolid #5


   8/28 SP Azithromycin #7/7


   8/26 SP Ceftriaxone #2


   8/25 SP IV Vancomycin #4, Zosyn #4


   8/22 SP Cefepime x1, Flagyl x1





- Monitor CBC, BMP.


.f/u cx


- COVID neg x3


- Monitor chest x-ray.


- Monitor the patient's clinical course and labs.  Based on those, we will do 

further recommendation.


-f/u Fungitell, asp ag, flow cytometry


-poor prognosis


-f/u u/a, ucx, sp cx








Thank you, Dr. Cherry, for allowing me to participate in the care of


this patient.  I will follow the patient with you at this


hospitalization.








Discussed with RN





Subjective


Allergies:  


Coded Allergies:  


     No Known Allergies (Unverified , 1/8/19)


afebrile


remains intubated; Fio2 65%


mild leukocytosis resolved





Objective





Last 24 Hour Vital Signs








  Date Time  Temp Pulse Resp B/P (MAP) Pulse Ox O2 Delivery O2 Flow Rate FiO2


 


10/6/20 11:00  64 22 114/48 (70) 98   


 


10/6/20 11:00  62 24     65


 


10/6/20 10:00  64 27 101/50 (67) 97   


 


10/6/20 09:00  66 18 112/47 (68) 89   


 


10/6/20 08:00      Mechanical Ventilator  





      Mechanical Ventilator  





      Mechanical Ventilator  


 


10/6/20 08:00 98.2 66 29 101/47 (65) 96   


 


10/6/20 08:00        65


 


10/6/20 08:00  64      


 


10/6/20 07:00  71 30 90/56 (67) 96   


 


10/6/20 06:00  63 36 93/39 (57) 96   


 


10/6/20 05:00  69 36 108/63 (78) 94   


 


10/6/20 04:00        75


 


10/6/20 04:00  69      


 


10/6/20 04:00 98.4 64 24 99/47 (64) 95   


 


10/6/20 04:00      Mechanical Ventilator  





      Mechanical Ventilator  





      Mechanical Ventilator  


 


10/6/20 03:08  56 24     75


 


10/6/20 03:00  61 24 104/47 (66) 96   


 


10/6/20 02:00  69 22 125/53 (77) 96   


 


10/6/20 01:00  62 27 128/44 (72) 98   


 


10/6/20 00:00 98.2 65 25 104/59 (74) 98   


 


10/6/20 00:00      Mechanical Ventilator  





      Mechanical Ventilator  





      Mechanical Ventilator  


 


10/6/20 00:00        75


 


10/6/20 00:00  65      


 


10/5/20 23:19  62 24     75


 


10/5/20 23:00  61 25 88/44 (59) 99   


 


10/5/20 22:00  64 25 100/37 (58) 97   


 


10/5/20 21:19  69 27 112/39 (63) 99   


 


10/5/20 21:00  64 24 84/41 (55) 98   


 


10/5/20 20:00  64      


 


10/5/20 20:00        75


 


10/5/20 20:00 98.0 66 25 97/58 (71) 97   


 


10/5/20 20:00      Mechanical Ventilator  





      Mechanical Ventilator  





      Mechanical Ventilator  


 


10/5/20 19:30  67 24     75


 


10/5/20 19:00  61 24 94/45 (61) 99   


 


10/5/20 18:00 98.8 62 24 86/38 (54) 98   


 


10/5/20 17:00 98.8 63 23 116/49 (71) 98   


 


10/5/20 16:00      Mechanical Ventilator  





      Mechanical Ventilator  





      Mechanical Ventilator  


 


10/5/20 16:00 98.8 64 27 112/40 (64) 97   


 


10/5/20 16:00  64      


 


10/5/20 15:20  64 24     75


 


10/5/20 15:00  63 26 101/53 (69) 99   


 


10/5/20 14:00  63 30 90/47 (61) 98   


 


10/5/20 13:50        75


 


10/5/20 13:00  59 34 95/42 (59) 100   


 


10/5/20 12:00  61      


 


10/5/20 12:00 98.8 61 24 106/45 (65) 98   


 


10/5/20 12:00        85


 


10/5/20 12:00      Mechanical Ventilator  





      Mechanical Ventilator  





      Mechanical Ventilator  








Height (Feet):  5


Height (Inches):  3.00


Weight (Pounds):  172


HEENT:  . ETT in place


NECK:  No lymphadenopathy.


CHEST:  Coarse breathing sounds.


HEART:  S1 and S2.


ABDOMEN:  Soft.  PEG tube in place.


SKIN: no rash





Laboratory Tests








Test


 10/5/20


12:05 10/5/20


13:30 10/5/20


17:48 10/5/20


21:21


 


POC Whole Blood Glucose


 143 MG/DL


()  H 


 Pending  


 147 MG/DL


()  H


 


Urine Color  Pale yellow    


 


Urine Appearance  Clear    


 


Urine pH  8 (4.5-8.0)    


 


Urine Specific Gravity


 


 1.010


(1.005-1.035) 


 





 


Urine Protein


 


 1+ (NEGATIVE)


H 


 





 


Urine Glucose (UA)


 


 Negative


(NEGATIVE) 


 





 


Urine Ketones


 


 Negative


(NEGATIVE) 


 





 


Urine Blood


 


 2+ (NEGATIVE)


H 


 





 


Urine Nitrite


 


 Negative


(NEGATIVE) 


 





 


Urine Bilirubin


 


 Negative


(NEGATIVE) 


 





 


Urine Urobilinogen


 


 12 MG/DL


(0.0-1.0)  H 


 





 


Urine Leukocyte Esterase


 


 2+ (NEGATIVE)


H 


 





 


Urine RBC


 


 5-10 /HPF (0 -


2)  H 


 





 


Urine WBC


 


 10-15 /HPF (0


- 2)  H 


 





 


Urine Squamous Epithelial


Cells 


 Few /LPF


(NONE/OCC) 


 





 


Urine Bacteria


 


 Moderate /HPF


(NONE)  H 


 





 


Test


 10/5/20


23:51 10/6/20


04:30 


 





 


POC Whole Blood Glucose


 151 MG/DL


()  H 


 


 





 


White Blood Count


 


 9.6 K/UL


(4.8-10.8) 


 





 


Red Blood Count


 


 2.58 M/UL


(4.20-5.40)  L 


 





 


Hemoglobin


 


 8.2 G/DL


(12.0-16.0)  L 


 





 


Hematocrit


 


 24.8 %


(37.0-47.0)  L 


 





 


Mean Corpuscular Volume  96 FL (80-99)    


 


Mean Corpuscular Hemoglobin


 


 31.9 PG


(27.0-31.0)  H 


 





 


Mean Corpuscular Hemoglobin


Concent 


 33.2 G/DL


(32.0-36.0) 


 





 


Red Cell Distribution Width


 


 18.1 %


(11.6-14.8)  H 


 





 


Platelet Count


 


 89 K/UL


(150-450)  L 


 





 


Mean Platelet Volume


 


 8.7 FL


(6.5-10.1) 


 





 


Neutrophils (%) (Auto)


 


 % (45.0-75.0)


 


 





 


Lymphocytes (%) (Auto)


 


 % (20.0-45.0)


 


 





 


Monocytes (%) (Auto)   % (1.0-10.0)    


 


Eosinophils (%) (Auto)   % (0.0-3.0)    


 


Basophils (%) (Auto)   % (0.0-2.0)    


 


Differential Total Cells


Counted 


 100  


 


 





 


Neutrophils % (Manual)  80 % (45-75)  H  


 


Lymphocytes % (Manual)  14 % (20-45)  L  


 


Monocytes % (Manual)  4 % (1-10)    


 


Eosinophils % (Manual)  1 % (0-3)    


 


Basophils % (Manual)  0 % (0-2)    


 


Band Neutrophils  1 % (0-8)    


 


Platelet Estimate  Decreased  L  


 


Platelet Morphology  Normal    


 


Hypochromasia  1+    


 


Anisocytosis  2+    


 


Erythrocyte Sedimentation Rate


 


 52 MM/HR


(0-30)  H 


 





 


Sodium Level


 


 144 MMOL/L


(136-145) 


 





 


Potassium Level


 


 3.7 MMOL/L


(3.5-5.1) 


 





 


Chloride Level


 


 112 MMOL/L


()  H 


 





 


Carbon Dioxide Level


 


 29 MMOL/L


(21-32) 


 





 


Blood Urea Nitrogen


 


 20 mg/dL


(7-18)  H 


 





 


Creatinine


 


 0.6 MG/DL


(0.55-1.30) 


 





 


Estimat Glomerular Filtration


Rate 


 > 60 mL/min


(>60) 


 





 


Glucose Level


 


 164 MG/DL


()  H 


 





 


Calcium Level


 


 6.9 MG/DL


(8.5-10.1)  L 


 





 


Phosphorus Level


 


 1.5 MG/DL


(2.5-4.9)  L 


 





 


Magnesium Level


 


 1.8 MG/DL


(1.8-2.4) 


 





 


Total Bilirubin


 


 0.7 MG/DL


(0.2-1.0) 


 





 


Aspartate Amino Transf


(AST/SGOT) 


 20 U/L (15-37)


 


 





 


Alanine Aminotransferase


(ALT/SGPT) 


 18 U/L (12-78)


 


 





 


Alkaline Phosphatase


 


 51 U/L


() 


 





 


C-Reactive Protein,


Quantitative 


 15.5 mg/dL


(0.00-0.90)  H 


 





 


Total Protein


 


 4.3 G/DL


(6.4-8.2)  L 


 





 


Albumin


 


 1.1 G/DL


(3.4-5.0)  L 


 





 


Globulin  3.2 g/dL    


 


Albumin/Globulin Ratio


 


 0.3 (1.0-2.7)


L 


 














Current Medications








 Medications


  (Trade)  Dose


 Ordered  Sig/Didi


 Route


 PRN Reason  Start Time


 Stop Time Status Last Admin


Dose Admin


 


 Acetaminophen


  (Tylenol)  650 mg  Q4H  PRN


 GT


 For Pain  9/23/20 17:15


 10/23/20 17:14  10/2/20 17:46





 


 Chlorhexidine


 Gluconate


  (Beatrice-Hex 2%)  1 applic  DAILY@2000


 TOPIC


   8/31/20 20:00


 11/29/20 19:59  10/5/20 20:21





 


 Clotrimazole


  (Lotrimin)  1 applic  Q12HR


 TOPIC


   8/30/20 13:00


 11/28/20 12:59  10/6/20 09:22





 


 Dextrose


  (Dextrose 50%)  25 ml  Q30M  PRN


 IV


 Hypoglycemia  8/26/20 11:30


 11/20/20 11:29   





 


 Dextrose


  (Dextrose 50%)  50 ml  Q30M  PRN


 IV


 Hypoglycemia  8/26/20 11:30


 11/20/20 11:29   





 


 Hydrocortisone


  (Solu-CORTEF)  50 mg  EVERY 12  HOURS


 IV


   10/2/20 21:00


 12/27/20 20:59  10/6/20 09:21





 


 Insulin Aspart


  (NovoLOG)    Q6HR


 SUBQ


   9/18/20 12:00


 12/17/20 11:59  10/6/20 06:36





 


 Insulin Detemir


  (Levemir)  10 units  Q12HR


 SUBQ


   9/18/20 10:00


 12/17/20 09:59  10/6/20 09:45





 


 Levothyroxine


 Sodium


  (Synthroid)  75 mcg  DAILY@0630


 GT


   9/30/20 06:30


 10/30/20 06:29  10/6/20 06:35





 


 Loperamide HCl


  (Imodium)  2 mg  Q6H  PRN


 NG


 Diarrhea  9/16/20 10:30


 10/16/20 10:29  9/23/20 11:41





 


 Lorazepam


  (Ativan 2mg/ml


 1ml)  2 mg  Q4H  PRN


 IV


 For Anxiety  10/5/20 00:45


 10/12/20 00:44  10/5/20 03:06





 


 Pantoprazole


  (Protonix)  40 mg  EVERY 12  HOURS


 IVP


   9/28/20 21:00


 10/28/20 20:59  10/6/20 09:21





 


 Potassium Chloride


  (K-Dur)  40 meq  EVERY 12  HOURS


 GT


   9/26/20 21:00


 12/19/20 12:14  10/6/20 09:21





 


 Sodium Chloride  1,000 ml @ 


 50 mls/hr  Q20H


 IV


   10/4/20 11:00


 11/3/20 10:59  10/6/20 01:27




















Rema Gomes M.D.             Oct 6, 2020 11:21

## 2020-10-06 NOTE — NUR
RD ASSESSMENT & RECOMMENDATIONS

SEE CARE ACTIVITY FOR COMPLETE ASSESSMENT



DAILY ESTIMATED NEEDS:

Needs based on CRITICAL CARE, 50kg  

22-28  kcals/kg 

8583-2035  total kcals

1.25-2  g protein/kg

  g total protein 

25-30  mL/kg

2713-3946  total fluid mLs



NUTRITION DIAGNOSIS:

Swallowing difficulty r/t dysphagia as evidenced by pt w/ Downs Syndrome,

PEG dep for all nutritional needs, now intubated, off pressor support

pending trach placement.

 



CURRENT TF:Vital AF 1.2 @ 50ml/hr x 22 hrs (On Synthroid QD)- held for procedure 





ENTERAL NUTRITION RECOMMENDATIONS:

VITAL AF 1.2 @ 50ml/hr x 22 hrs  to provide 1100ml, 1320 kcal, 83g prot, 892ml free wa ter 



* Maintain current TF: meets 100% est kcal/prot needs

* Hold 1 hr before and after Synthroid med

* HOB over 30 degrees/ water flush per MD





ADDITIONAL RECOMMENDATIONS:

1) Ht of 61 inches per SN[facility]; recalibrate bed scale for accurate CBW  

2) Add NISS: BGs now in the 200's, on Solucortef-> NOW ON NISS + LEVEMIR 

3) Monitor hemodynamic stability: OFF PRESSOR SUPPORT 

4) Wound healing: add Vit C 250mg QD + continue Marcio BID 

5) Monitor lytes, replete as needed (low phos) 

6) Probiotics for diarrhea  

.

## 2020-10-06 NOTE — DIAGNOSTIC IMAGING REPORT
Indication: Dyspnea

 

Technique: One view of the chest

 

Comparison: 10/5/2020

 

Findings: Endotracheal tube projects just above the lisandro. Left chest vent catheter

is again demonstrated in good position. There is no pneumothorax. Stable satisfactory

position left arm PICC. Bilateral extensive infiltrates are unchanged.

 

Impression:  Unchanged, over one day, findings as above.

## 2020-10-06 NOTE — NEPHROLOGY PROGRESS NOTE
Assessment/Plan


Problem List:  


(1) DAVID (acute kidney injury)


(2) Respiratory failure requiring intubation


(3) Down's syndrome


(4) Seizure disorder


(5) Hypothyroidism


Assessment





Acute renal failure, likely due to hypotension


Acute respiratory distress, hypoxia


Seizure disorder


Hypothyroidism


Down syndrome


Full code











Fluid challenge with IV fluids and albumin


Midodrine for BP above 100 systolic


Check TSH level


Check


Correct level


Monitor renal parameters


Urine studies


Per orders


Plan


October 6: On ventilator.  Tracheostomy postponed because patient is clinically 

unstable.  Still have left chest tube draining.  Labs reviewed.  Electrolyte 

abnormalities addressed.  Continue per consultants.


October 5: Remains intubated on ventilator.  Discussed with RN.  Unclear if the 

patient is due for tracheostomy today or not.  Apparently the patient was 

reintubated again yesterday.  Patient has left chest tube.  Electrolyte 

abnormalities addressed.


October 4: Remains intubated on ventilator.  Due for tracheostomy tomorrow.  

Left chest tube present.  Patient is full code.  Labs reviewed.  Continue as is.


October 3: Remains intubated on ventilator.  Due for tracheostomy October 5.  

Has left-sided chest tube.  Stable from renal standpoint to view.  Continue per 

consultants.


October 2: Remains intubated on ventilator.  Electrolyte abnormalities 

addressed.  Supplements ordered.


October 1: Intubated on ventilator.  Labs reviewed.  Potassium supplement given.

 Remains bradycardic.  Continue per consultants.


September 30: Labs reviewed.  Electrolyte abnormalities addressed.  Heart rate 

remains bradycardic.  Continue per cardiology.  Continue to monitor renal 

parameters and electrolytes.


September 29: Labs reviewed.  Electrolyte abnormalities addressed and replaced. 

Medication list reviewed.  Heart rate remains mid 50s.  Continue as is.


September 28: On ventilator.  Full code.  Heart rate low.  Will discontinue 

Midodrin.  Continue to rest.  Electrolytes within normal limit.  Discussed with 

RN.


September 27: Remains intubated.  Full code.  No plan for tracheostomy yet.  

Labs reviewed.  All acceptable.  Continue same management


September 26: Labs reviewed.  Discussed with RN.  Potassium and phosphorus and 

magnesium replacement ordered.  Hemoglobin 9.5.  Patient remains full code.  

Continue per consultants.


September 25: Lab reviewed.  Renal parameters stable.  Full code.  Intubated.  

Electrolyte abnormalities addressed and replacement done.


September 24: Lab reviewed.  Renal parameters stable.  Full code.  Intubated.  

Has right side chest tube.  Continue per consultants.


September 23: Lab reviewed.  Renal parameters stable.  Full code.  Has right 

chest tube.  Planning process for tracheostomy.  Defer to chest and general 

surgeon.


September 22: Labs reviewed.  Renal parameters stable.  Remains full code.  

Remains on ventilator.  Due for tracheostomy tomorrow.  Continue per 

consultants.  Patient has a right chest tube in place at this time.


September 21: Labs reviewed.  Electrolyte imbalances addressed and supplemented.

 Remains full code and on ventilator.  Continue to monitor renal parameters.  

Continue per consultants.


September 20: Labs reviewed.  Serum potassium again low today.  Potassium 

supplement IV and through GT given.  Patient remains full code and is on 

ventilator.  Continue per consultants.  Continue to monitor electrolytes and 

renal parameters.


September 19: Labs reviewed.  Abnormal electrolyte addressed.  Remains full 

code.  Remains vented.


September 18: Day 27 of hospitalization.  Full code.  Labs reviewed.  Hemoglobin

down to 7.5.  Electrolyte abnormalities addressed and corrections ordered.  

Continue to monitor renal parameters.  Continue per consultants.  Start on 

Levemir for blood sugar management.  Questioning continuation of hydrocortisone?


September 17: Labs reviewed.  Potassium, phosphorus, hemoglobin, are all low.  

Potassium and phosphorus IV replacement given.  Continue to monitor electrolytes

and CBC.  Patient remains full code.


September 16: Lab reviewed.  Low phosphorus low magnesium and low potassium was 

addressed.  Hemoglobin drifting lower.  Continue per consultants.  Patient 

remains full code.


September 15: Labs reviewed.  Patient continues to be on ventilator.  D5W for 

high sodium and also potassium chloride intravenously as supplement given.  

Hemoglobin 8.4.  Continue to monitor electrolytes and renal parameters.


September 14: Labs reviewed.  Low potassium and high sodium noted.  Hemoglobin 

8.1 stable.  Aim to correct abnormal electrolyte.  Continue rest.  Will give 2 

boluses of D5W 500 cc.


September 13: Lab reviewed.  Abnormal electrolytes noted and addressed.


September 12: Labs reviewed.  Potassium supplement given.  Patient remains full 

code.  Continue per consultants.


September 11: Lab reviewed.  Electrolyte abnormalities addressed.  Continue per 

pulmonary and ID.


September 10: Lab reviewed.  Status unchanged.  Serum sodium 151 unchanged.  

Stable from renal standpoint of view.


September 9: Labs reviewed.  Status quo.  D5W 500 cc IV ordered.  Continue to 

monitor renal parameters.


September 8: Status quo.  Labs reviewed.  Overall condition unchanged.  Patient 

was transfused and hemoglobin higher.  Continue current management.  Patient 

remains full code.


September 7: Status quo.  Overall condition poor.  Very low albumin.  Edematous.

 Hypotensive.  Hemoglobin lower.  Anemia work-up ordered.  I favor transfusion 2

units of packed RBCs.  Patient remains full code.  I favor supportive care only.

 Will discuss.


September 6: Electrolyte abnormalities addressed.  Serum creatinine lower.  

Continue per current management.


September 5: Status unchanged.  Lab reviewed.  Serum potassium 2.7.  IV 

potassium chloride ordered.  Serum creatinine low at 1.6 stable.  Blood pressure

90s systolic


September 4: Status quo.  Labs reviewed.  Renal parameters stable.  Serum 

creatinine down to 1.6.  Medication list reviewed.  Continues to be on 

midodrine.  Continue per consultants.


September 3: Status quo.  Labs reviewed.  Electrolytes adjusted.  Serum 

creatinine down to 1.8.  Continue per consultants.


September 2: Status quo.  Labs reviewed.  Phosphorus supplement IV given.  Serum

creatinine 2.  Continue per consultants.


September 1: Requires less pressors.  Albumin bolus given.  1 dose of Lasix IV 

ordered as the patient severely edematous.  Patient serum albumin is very low.  

Continue per consultants.


August 31: Continues to be intubated.  Labs reviewed.  Serum creatinine 1.9 

unchanged.  Blood pressure more stable.  Off 1 of the pressors.  Continue to 

monitor renal parameters.  Continue per consultants.  Patient now on 

hydrocortisone 100 mg every 8 hours.  Will decrease IV fluid.  Normal saline 

down to 50 cc an hour.


August 30: Intubated.  Labs reviewed.  Creatinine 1.9 unchanged.  Continue same 

treatment plan.  Per consultants.  Overall poor prognosis since the patient 

remains on pressors and her pulmonary status is worsening.


August 29: Remains intubated.  Labs reviewed.  Creatinine 1.9.  Blood pressure 

systolic 90s.  Continue per consultants.


August 28: Remains intubated.  Labs reviewed.  Serum creatinine lower to 2.  

Vancomycin level lower.  Remains hypotensive on pressors.  Will increase m

idodrine to 10 mg every 8 hours.  Continue per consultants.  Continue to monitor

renal parameters.


August 27: Patient now in ICU.  Intubated.  On pressors.  Labs reviewed.  Will 

increase midodrine.  Aim to keep blood pressure over 100 systolic.  Will give 

albumin bolus.  Will check vancomycin level which was elevated when checked 

previously on August 24.  Will monitor renal parameters.  Continue per 

consultants.





Subjective


ROS Limited/Unobtainable:  Yes





Objective


Objective





Last 24 Hour Vital Signs








  Date Time  Temp Pulse Resp B/P (MAP) Pulse Ox O2 Delivery O2 Flow Rate FiO2


 


10/6/20 11:00  64 22 114/48 (70) 98   


 


10/6/20 11:00  62 24     65


 


10/6/20 10:00  64 27 101/50 (67) 97   


 


10/6/20 09:00  66 18 112/47 (68) 89   


 


10/6/20 08:00      Mechanical Ventilator  





      Mechanical Ventilator  





      Mechanical Ventilator  


 


10/6/20 08:00 98.2 66 29 101/47 (65) 96   


 


10/6/20 08:00        65


 


10/6/20 08:00  64      


 


10/6/20 07:00  71 30 90/56 (67) 96   


 


10/6/20 06:00  63 36 93/39 (57) 96   


 


10/6/20 05:00  69 36 108/63 (78) 94   


 


10/6/20 04:00        75


 


10/6/20 04:00  69      


 


10/6/20 04:00 98.4 64 24 99/47 (64) 95   


 


10/6/20 04:00      Mechanical Ventilator  





      Mechanical Ventilator  





      Mechanical Ventilator  


 


10/6/20 03:08  56 24     75


 


10/6/20 03:00  61 24 104/47 (66) 96   


 


10/6/20 02:00  69 22 125/53 (77) 96   


 


10/6/20 01:00  62 27 128/44 (72) 98   


 


10/6/20 00:00 98.2 65 25 104/59 (74) 98   


 


10/6/20 00:00      Mechanical Ventilator  





      Mechanical Ventilator  





      Mechanical Ventilator  


 


10/6/20 00:00        75


 


10/6/20 00:00  65      


 


10/5/20 23:19  62 24     75


 


10/5/20 23:00  61 25 88/44 (59) 99   


 


10/5/20 22:00  64 25 100/37 (58) 97   


 


10/5/20 21:19  69 27 112/39 (63) 99   


 


10/5/20 21:00  64 24 84/41 (55) 98   


 


10/5/20 20:00  64      


 


10/5/20 20:00        75


 


10/5/20 20:00 98.0 66 25 97/58 (71) 97   


 


10/5/20 20:00      Mechanical Ventilator  





      Mechanical Ventilator  





      Mechanical Ventilator  


 


10/5/20 19:30  67 24     75


 


10/5/20 19:00  61 24 94/45 (61) 99   


 


10/5/20 18:00 98.8 62 24 86/38 (54) 98   


 


10/5/20 17:00 98.8 63 23 116/49 (71) 98   


 


10/5/20 16:00      Mechanical Ventilator  





      Mechanical Ventilator  





      Mechanical Ventilator  


 


10/5/20 16:00 98.8 64 27 112/40 (64) 97   


 


10/5/20 16:00  64      


 


10/5/20 15:20  64 24     75


 


10/5/20 15:00  63 26 101/53 (69) 99   


 


10/5/20 14:00  63 30 90/47 (61) 98   


 


10/5/20 13:50        75


 


10/5/20 13:00  59 34 95/42 (59) 100   

















Intake and Output  


 


 10/5/20 10/6/20





 19:00 07:00


 


Intake Total 980 ml 1290 ml


 


Output Total 720 ml 920 ml


 


Balance 260 ml 370 ml


 


  


 


Free Water 30 ml 90 ml


 


IV Total 400 ml 600 ml


 


Tube Feeding 550 ml 600 ml


 


Output Urine Total 670 ml 850 ml


 


Chest Tube Drainage Total 50 ml 70 ml


 


# Bowel Movements 1 








Laboratory Tests


10/5/20 13:30: 


Urine Color Pale yellow, Urine Appearance Clear, Urine pH 8, Urine Specific 

Gravity 1.010, Urine Protein 1+H, Urine Glucose (UA) Negative, Urine Ketones 

Negative, Urine Blood 2+H, Urine Nitrite Negative, Urine Bilirubin Negative, 

Urine Urobilinogen 12H, Urine Leukocyte Esterase 2+H, Urine RBC 5-10H, Urine WBC

10-15H, Urine Squamous Epithelial Cells Few, Urine Bacteria ModerateH


10/5/20 17:48: POC Whole Blood Glucose [Pending]


10/5/20 21:21: POC Whole Blood Glucose 147H


10/5/20 23:51: POC Whole Blood Glucose 151H


10/6/20 04:30: 


White Blood Count 9.6, Red Blood Count 2.58L, Hemoglobin 8.2L, Hematocrit 24.8L,

 Mean Corpuscular Volume 96, Mean Corpuscular Hemoglobin 31.9H, Mean Corpuscular

 Hemoglobin Concent 33.2, Red Cell Distribution Width 18.1H, Platelet Count 89L,

 Mean Platelet Volume 8.7, Neutrophils (%) (Auto) , Lymphocytes (%) (Auto) , 

Monocytes (%) (Auto) , Eosinophils (%) (Auto) , Basophils (%) (Auto) , 

Differential Total Cells Counted 100, Neutrophils % (Manual) 80H, Lymphocytes % 

(Manual) 14L, Monocytes % (Manual) 4, Eosinophils % (Manual) 1, Basophils % 

(Manual) 0, Band Neutrophils 1, Platelet Estimate DecreasedL, Platelet 

Morphology Normal, Hypochromasia 1+, Anisocytosis 2+, Erythrocyte Sedimentation 

Rate 52H, Sodium Level 144, Potassium Level 3.7, Chloride Level 112H, Carbon 

Dioxide Level 29, Blood Urea Nitrogen 20H, Creatinine 0.6, Estimat Glomerular 

Filtration Rate > 60, Glucose Level 164H, Calcium Level 6.9L, Phosphorus Level 

1.5L, Magnesium Level 1.8, Total Bilirubin 0.7, Aspartate Amino Transf 

(AST/SGOT) 20, Alanine Aminotransferase (ALT/SGPT) 18, Alkaline Phosphatase 51, 

C-Reactive Protein, Quantitative 15.5H, Total Protein 4.3L, Albumin 1.1L, 

Globulin 3.2, Albumin/Globulin Ratio 0.3L


10/6/20 12:13: POC Whole Blood Glucose 128H


Height (Feet):  5


Height (Inches):  3.00


Weight (Pounds):  172


General Appearance:  no apparent distress


EENT:  other - Intubated on ventilator


Cardiovascular:  normal rate


Respiratory/Chest:  decreased breath sounds, other - Left-sided chest tube











Lam Nino MD             Oct 6, 2020 12:58

## 2020-10-06 NOTE — CARDIOLOGY PROGRESS NOTE
Assessment/Plan


Assessment/Plan


sepsis


respiratory failure 


ards 


renal insuf 


bacteremia


abn cardiac enzyme due to demand 


sinus arnold stable 


thrombocytopenia resolved  


hypernatremia 


pneumothorax now s/p chest tube on the left now 











vent support 


abx 


 


tsh seems fine 


remains off pressor still at this time 


hr inthe 60's s no sig pauses on tele 


tele personally reviewed 


she pulled out the right sided ct has left thoravent


trach plans cancelled on 10/5


bp better 


still sig secretions 


no on 60% 


 








Subjective


Subjective


on  a vent not communicative not  AWAKE





Objective





Last 24 Hour Vital Signs








  Date Time  Temp Pulse Resp B/P (MAP) Pulse Ox O2 Delivery O2 Flow Rate FiO2


 


10/6/20 14:00  61 24 120/63 (82) 97   


 


10/6/20 13:00  63 23 117/57 (77) 98   


 


10/6/20 12:00        65


 


10/6/20 12:00  64      


 


10/6/20 12:00 98.5 65 20 119/50 (73) 96   


 


10/6/20 12:00      Mechanical Ventilator  





      Mechanical Ventilator  





      Mechanical Ventilator  


 


10/6/20 11:00  64 22 114/48 (70) 98   


 


10/6/20 11:00  62 24     65


 


10/6/20 10:00  64 27 101/50 (67) 97   


 


10/6/20 09:00  66 18 112/47 (68) 89   


 


10/6/20 08:00      Mechanical Ventilator  





      Mechanical Ventilator  





      Mechanical Ventilator  


 


10/6/20 08:00 98.2 66 29 101/47 (65) 96   


 


10/6/20 08:00        65


 


10/6/20 08:00  64      


 


10/6/20 07:00  71 30 90/56 (67) 96   


 


10/6/20 06:00  63 36 93/39 (57) 96   


 


10/6/20 05:00  69 36 108/63 (78) 94   


 


10/6/20 04:00        75


 


10/6/20 04:00  69      


 


10/6/20 04:00 98.4 64 24 99/47 (64) 95   


 


10/6/20 04:00      Mechanical Ventilator  





      Mechanical Ventilator  





      Mechanical Ventilator  


 


10/6/20 03:08  56 24     75


 


10/6/20 03:00  61 24 104/47 (66) 96   


 


10/6/20 02:00  69 22 125/53 (77) 96   


 


10/6/20 01:00  62 27 128/44 (72) 98   


 


10/6/20 00:00 98.2 65 25 104/59 (74) 98   


 


10/6/20 00:00      Mechanical Ventilator  





      Mechanical Ventilator  





      Mechanical Ventilator  


 


10/6/20 00:00        75


 


10/6/20 00:00  65      


 


10/5/20 23:19  62 24     75


 


10/5/20 23:00  61 25 88/44 (59) 99   


 


10/5/20 22:00  64 25 100/37 (58) 97   


 


10/5/20 21:19  69 27 112/39 (63) 99   


 


10/5/20 21:00  64 24 84/41 (55) 98   


 


10/5/20 20:00  64      


 


10/5/20 20:00        75


 


10/5/20 20:00 98.0 66 25 97/58 (71) 97   


 


10/5/20 20:00      Mechanical Ventilator  





      Mechanical Ventilator  





      Mechanical Ventilator  


 


10/5/20 19:30  67 24     75


 


10/5/20 19:00  61 24 94/45 (61) 99   


 


10/5/20 18:00 98.8 62 24 86/38 (54) 98   


 


10/5/20 17:00 98.8 63 23 116/49 (71) 98   


 


10/5/20 16:00      Mechanical Ventilator  





      Mechanical Ventilator  





      Mechanical Ventilator  


 


10/5/20 16:00 98.8 64 27 112/40 (64) 97   


 


10/5/20 16:00  64      








General Appearance:  no apparent distress, on vent, patient on isolation, 

isolation precautions


Neck:  supple


Cardiovascular:  normal rate


Respiratory/Chest:  rhonchi - bilaterally


Abdomen:  normal bowel sounds, non tender, soft


Extremities:  no swelling











Intake and Output  


 


 10/5/20 10/6/20





 19:00 07:00


 


Intake Total 980 ml 1290 ml


 


Output Total 720 ml 920 ml


 


Balance 260 ml 370 ml


 


  


 


Free Water 30 ml 90 ml


 


IV Total 400 ml 600 ml


 


Tube Feeding 550 ml 600 ml


 


Output Urine Total 670 ml 850 ml


 


Chest Tube Drainage Total 50 ml 70 ml


 


# Bowel Movements 1 











Laboratory Tests








Test


 10/5/20


17:48 10/5/20


21:21 10/5/20


23:51 10/6/20


04:30


 


POC Whole Blood Glucose


 Pending  


 147 MG/DL


()  H 151 MG/DL


()  H 





 


White Blood Count


 


 


 


 9.6 K/UL


(4.8-10.8)


 


Red Blood Count


 


 


 


 2.58 M/UL


(4.20-5.40)  L


 


Hemoglobin


 


 


 


 8.2 G/DL


(12.0-16.0)  L


 


Hematocrit


 


 


 


 24.8 %


(37.0-47.0)  L


 


Mean Corpuscular Volume    96 FL (80-99)  


 


Mean Corpuscular Hemoglobin


 


 


 


 31.9 PG


(27.0-31.0)  H


 


Mean Corpuscular Hemoglobin


Concent 


 


 


 33.2 G/DL


(32.0-36.0)


 


Red Cell Distribution Width


 


 


 


 18.1 %


(11.6-14.8)  H


 


Platelet Count


 


 


 


 89 K/UL


(150-450)  L


 


Mean Platelet Volume


 


 


 


 8.7 FL


(6.5-10.1)


 


Neutrophils (%) (Auto)


 


 


 


 % (45.0-75.0)





 


Lymphocytes (%) (Auto)


 


 


 


 % (20.0-45.0)





 


Monocytes (%) (Auto)     % (1.0-10.0)  


 


Eosinophils (%) (Auto)     % (0.0-3.0)  


 


Basophils (%) (Auto)     % (0.0-2.0)  


 


Differential Total Cells


Counted 


 


 


 100  





 


Neutrophils % (Manual)    80 % (45-75)  H


 


Lymphocytes % (Manual)    14 % (20-45)  L


 


Monocytes % (Manual)    4 % (1-10)  


 


Eosinophils % (Manual)    1 % (0-3)  


 


Basophils % (Manual)    0 % (0-2)  


 


Band Neutrophils    1 % (0-8)  


 


Platelet Estimate    Decreased  L


 


Platelet Morphology    Normal  


 


Hypochromasia    1+  


 


Anisocytosis    2+  


 


Erythrocyte Sedimentation Rate


 


 


 


 52 MM/HR


(0-30)  H


 


Sodium Level


 


 


 


 144 MMOL/L


(136-145)


 


Potassium Level


 


 


 


 3.7 MMOL/L


(3.5-5.1)


 


Chloride Level


 


 


 


 112 MMOL/L


()  H


 


Carbon Dioxide Level


 


 


 


 29 MMOL/L


(21-32)


 


Blood Urea Nitrogen


 


 


 


 20 mg/dL


(7-18)  H


 


Creatinine


 


 


 


 0.6 MG/DL


(0.55-1.30)


 


Estimat Glomerular Filtration


Rate 


 


 


 > 60 mL/min


(>60)


 


Glucose Level


 


 


 


 164 MG/DL


()  H


 


Calcium Level


 


 


 


 6.9 MG/DL


(8.5-10.1)  L


 


Phosphorus Level


 


 


 


 1.5 MG/DL


(2.5-4.9)  L


 


Magnesium Level


 


 


 


 1.8 MG/DL


(1.8-2.4)


 


Total Bilirubin


 


 


 


 0.7 MG/DL


(0.2-1.0)


 


Aspartate Amino Transf


(AST/SGOT) 


 


 


 20 U/L (15-37)





 


Alanine Aminotransferase


(ALT/SGPT) 


 


 


 18 U/L (12-78)





 


Alkaline Phosphatase


 


 


 


 51 U/L


()


 


C-Reactive Protein,


Quantitative 


 


 


 15.5 mg/dL


(0.00-0.90)  H


 


Total Protein


 


 


 


 4.3 G/DL


(6.4-8.2)  L


 


Albumin


 


 


 


 1.1 G/DL


(3.4-5.0)  L


 


Globulin    3.2 g/dL  


 


Albumin/Globulin Ratio


 


 


 


 0.3 (1.0-2.7)


L


 


Test


 10/6/20


12:13 


 


 





 


POC Whole Blood Glucose


 128 MG/DL


()  H 


 


 




















Constantine Jorgensen MD           Oct 6, 2020 15:31

## 2020-10-06 NOTE — NUR
NURSE NOTES:

Received pt with eyes close but easily arousable to tactile stimulation, orally intubated on 
ac mode Thoravent left chest to low intermittent suction with pale yellow drainage  
approximately 50ml.. Tolerated GT fdg  with acceptable residuals. HOB kept elevated on 
aspiration precaution. Pt with pressure sores  pls see pictures. All are covered with 
Optifoam . On P 200 mattress. Turned q 2hrs pRN with good skin care done. Will continue to 
monitor.

## 2020-10-06 NOTE — PULMONOLGY CRITICAL CARE NOTE
Critical Care - Asmt/Plan


Problems:  


(1) Acute respiratory failure


(2) Pneumothorax


Assessment & Plan:  resolved





(3) Bacteremia


(4) Pneumonia


(5) Sepsis


(6) HCAP (healthcare-associated pneumonia)


(7) Seizure disorder


(8) Down's syndrome


(9) Trisomy 21, Down syndrome


Respiratory:  monitor respiratory rate, adjust FIO2, CXR


Cardiac:  continue to monitor HR/BP


Renal:  F/U I&O, keep IV fluid, check electrolytes


Infectious Disease:  check cultures, continue antibiotics


Gastrointestinal:  continue feedings/current rate


Endocrine:  monitor blood sugar, continue sliding scale insulin


Hematologic:  monitor H/H, transfuse if hgb<8.5


Neurologic:  PRN Ativan, PRN Morphine, keep patient comfortable


Affect:  PRN ativan


Prophylaxis:  Protonix


Disposition:  keep in ICU


Notes Reviewed:  internist, cardio, renal


Discussed with:  nurses, consultants, 





Critical Care - Objective





Last 24 Hour Vital Signs








  Date Time  Temp Pulse Resp B/P (MAP) Pulse Ox O2 Delivery O2 Flow Rate FiO2


 


10/6/20 08:00      Mechanical Ventilator  





      Mechanical Ventilator  





      Mechanical Ventilator  


 


10/6/20 08:00        65


 


10/6/20 07:00  71 30 87/56 (66) 96   


 


10/6/20 06:00  63 36 93/39 (57) 96   


 


10/6/20 05:00  69 36 108/63 (78) 94   


 


10/6/20 04:00        75


 


10/6/20 04:00  69      


 


10/6/20 04:00 98.4 64 24 99/47 (64) 95   


 


10/6/20 04:00      Mechanical Ventilator  





      Mechanical Ventilator  





      Mechanical Ventilator  


 


10/6/20 03:08  56 24     75


 


10/6/20 03:00  61 24 104/47 (66) 96   


 


10/6/20 02:00  69 22 125/53 (77) 96   


 


10/6/20 01:00  62 27 128/44 (72) 98   


 


10/6/20 00:00 98.2 65 25 104/59 (74) 98   


 


10/6/20 00:00      Mechanical Ventilator  





      Mechanical Ventilator  





      Mechanical Ventilator  


 


10/6/20 00:00        75


 


10/6/20 00:00  65      


 


10/5/20 23:19  62 24     75


 


10/5/20 23:00  61 25 88/44 (59) 99   


 


10/5/20 22:00  64 25 100/37 (58) 97   


 


10/5/20 21:19  69 27 112/39 (63) 99   


 


10/5/20 21:00  64 24 84/41 (55) 98   


 


10/5/20 20:00  64      


 


10/5/20 20:00        75


 


10/5/20 20:00 98.0 66 25 97/58 (71) 97   


 


10/5/20 20:00      Mechanical Ventilator  





      Mechanical Ventilator  





      Mechanical Ventilator  


 


10/5/20 19:30  67 24     75


 


10/5/20 19:00  61 24 94/45 (61) 99   


 


10/5/20 18:00 98.8 62 24 86/38 (54) 98   


 


10/5/20 17:00 98.8 63 23 116/49 (71) 98   


 


10/5/20 16:00      Mechanical Ventilator  





      Mechanical Ventilator  





      Mechanical Ventilator  


 


10/5/20 16:00 98.8 64 27 112/40 (64) 97   


 


10/5/20 16:00  64      


 


10/5/20 15:20  64 24     75


 


10/5/20 15:00  63 26 101/53 (69) 99   


 


10/5/20 14:00  63 30 90/47 (61) 98   


 


10/5/20 13:50        75


 


10/5/20 13:00  59 34 95/42 (59) 100   


 


10/5/20 12:00  61      


 


10/5/20 12:00 98.8 61 24 106/45 (65) 98   


 


10/5/20 12:00        85


 


10/5/20 12:00      Mechanical Ventilator  





      Mechanical Ventilator  





      Mechanical Ventilator  


 


10/5/20 11:15  63 24     75


 


10/5/20 11:00  82 27 114/46 (68) 95   


 


10/5/20 10:00  69 25 98/47 (64) 100   


 


10/5/20 09:00  70 24 98/82 (87) 99   


 


10/5/20 09:00        85








Status:  awake


Condition:  critical


HEENT:  atraumatic, normocephalic


Neck:  full ROM


Lungs:  rales, rhonchi


Heart:  HR/BP stable


Abdomen:  soft, non-tender


Extremities:  no C/C/E


Decubiti:  location


Accucheck:  160





Critical Care - Subjective


ROS Limited/Unobtainable:  Yes


Condition:  critical


EKG Rhythm:  Sinus Rhythm


FI02:  65


Vent Support Breath Rate:  24


Vent Support Mode:  AC


Vent Tidal Volume:  500


Sputum Amount:  Small


PEEP:  0.0


PIP:  44


Tube Feeding Amount:  50


I&O:











Intake and Output  


 


 10/5/20 10/6/20





 19:00 07:00


 


Intake Total 980 ml 1290 ml


 


Output Total 720 ml 920 ml


 


Balance 260 ml 370 ml


 


  


 


Free Water 30 ml 90 ml


 


IV Total 400 ml 600 ml


 


Tube Feeding 550 ml 600 ml


 


Output Urine Total 670 ml 850 ml


 


Chest Tube Drainage Total 50 ml 70 ml


 


# Bowel Movements 1 








CXR:


Extensive bilateral airspace opacities, right greater than left, 


consistent with multifocal infiltrate worse pulmonary edema.  This is 


similar in appearance to the prior study.


 


Worsening, small left pleural effusion


ET-Tube:  7.5


ET Position:  23


Labs:





Laboratory Tests








Test


 10/5/20


12:05 10/5/20


13:30 10/5/20


17:48 10/5/20


21:21


 


POC Whole Blood Glucose


 143 MG/DL


()  H 


 Pending  


 147 MG/DL


()  H


 


Urine Color  Pale yellow    


 


Urine Appearance  Clear    


 


Urine pH  8 (4.5-8.0)    


 


Urine Specific Gravity


 


 1.010


(1.005-1.035) 


 





 


Urine Protein


 


 1+ (NEGATIVE)


H 


 





 


Urine Glucose (UA)


 


 Negative


(NEGATIVE) 


 





 


Urine Ketones


 


 Negative


(NEGATIVE) 


 





 


Urine Blood


 


 2+ (NEGATIVE)


H 


 





 


Urine Nitrite


 


 Negative


(NEGATIVE) 


 





 


Urine Bilirubin


 


 Negative


(NEGATIVE) 


 





 


Urine Urobilinogen


 


 12 MG/DL


(0.0-1.0)  H 


 





 


Urine Leukocyte Esterase


 


 2+ (NEGATIVE)


H 


 





 


Urine RBC


 


 5-10 /HPF (0 -


2)  H 


 





 


Urine WBC


 


 10-15 /HPF (0


- 2)  H 


 





 


Urine Squamous Epithelial


Cells 


 Few /LPF


(NONE/OCC) 


 





 


Urine Bacteria


 


 Moderate /HPF


(NONE)  H 


 





 


Test


 10/5/20


23:51 10/6/20


04:30 


 





 


POC Whole Blood Glucose


 151 MG/DL


()  H 


 


 





 


White Blood Count


 


 9.6 K/UL


(4.8-10.8) 


 





 


Red Blood Count


 


 2.58 M/UL


(4.20-5.40)  L 


 





 


Hemoglobin


 


 8.2 G/DL


(12.0-16.0)  L 


 





 


Hematocrit


 


 24.8 %


(37.0-47.0)  L 


 





 


Mean Corpuscular Volume  96 FL (80-99)    


 


Mean Corpuscular Hemoglobin


 


 31.9 PG


(27.0-31.0)  H 


 





 


Mean Corpuscular Hemoglobin


Concent 


 33.2 G/DL


(32.0-36.0) 


 





 


Red Cell Distribution Width


 


 18.1 %


(11.6-14.8)  H 


 





 


Platelet Count


 


 89 K/UL


(150-450)  L 


 





 


Mean Platelet Volume


 


 8.7 FL


(6.5-10.1) 


 





 


Neutrophils (%) (Auto)


 


 % (45.0-75.0)


 


 





 


Lymphocytes (%) (Auto)


 


 % (20.0-45.0)


 


 





 


Monocytes (%) (Auto)   % (1.0-10.0)    


 


Eosinophils (%) (Auto)   % (0.0-3.0)    


 


Basophils (%) (Auto)   % (0.0-2.0)    


 


Neutrophils % (Manual)  Pending    


 


Lymphocytes % (Manual)  Pending    


 


Platelet Estimate  Pending    


 


Platelet Morphology  Pending    


 


Erythrocyte Sedimentation Rate  Pending    


 


Sodium Level


 


 144 MMOL/L


(136-145) 


 





 


Potassium Level


 


 3.7 MMOL/L


(3.5-5.1) 


 





 


Chloride Level


 


 112 MMOL/L


()  H 


 





 


Carbon Dioxide Level


 


 29 MMOL/L


(21-32) 


 





 


Blood Urea Nitrogen


 


 20 mg/dL


(7-18)  H 


 





 


Creatinine


 


 0.6 MG/DL


(0.55-1.30) 


 





 


Estimat Glomerular Filtration


Rate 


 > 60 mL/min


(>60) 


 





 


Glucose Level


 


 164 MG/DL


()  H 


 





 


Calcium Level


 


 6.9 MG/DL


(8.5-10.1)  L 


 





 


Phosphorus Level


 


 1.5 MG/DL


(2.5-4.9)  L 


 





 


Magnesium Level


 


 1.8 MG/DL


(1.8-2.4) 


 





 


Total Bilirubin


 


 0.7 MG/DL


(0.2-1.0) 


 





 


Aspartate Amino Transf


(AST/SGOT) 


 20 U/L (15-37)


 


 





 


Alanine Aminotransferase


(ALT/SGPT) 


 18 U/L (12-78)


 


 





 


Alkaline Phosphatase


 


 51 U/L


() 


 





 


C-Reactive Protein,


Quantitative 


 15.5 mg/dL


(0.00-0.90)  H 


 





 


Total Protein


 


 4.3 G/DL


(6.4-8.2)  L 


 





 


Albumin


 


 1.1 G/DL


(3.4-5.0)  L 


 





 


Globulin  3.2 g/dL    


 


Albumin/Globulin Ratio


 


 0.3 (1.0-2.7)


L 


 




















Chano Cherry MD               Oct 6, 2020 08:45

## 2020-10-06 NOTE — NUR
NURSE NOTES:

Pt repositioned; oral care provided; pt afebrile. 

FiO2 titrated down to 55% at this time. 

Pt in no distress. SpO2 96-97%.

Will continue to monitor.

## 2020-10-06 NOTE — NUR
NURSE NOTES:

Pt repositioned and cleaned. 

No distress noted.

Chest tube drainage device changed.

## 2020-10-06 NOTE — NUR
NURSE NOTES:

Pt repositioned.

No acute distress noted. 

-------------------------------------------------------------------------------

Addendum: 10/06/20 at 1710 by Rylie Smith RN

-------------------------------------------------------------------------------

Late entry: pt seen by Dr Gomes at this time

## 2020-10-06 NOTE — NUR
NURSE NOTES:

Pt received from Lucie Floyd RN. Pt is asleep in bed, opens eyes spontaneously and makes 
eye contact when called by name but unable to follow simple commands. pupils are equal and 
round 3 mm bilaterally with sluggish rxn to light. Pt noted in SR to cardiac monitor. radial 
and dorsalis pedis pulses 2+. no edema noted. Pt is mechanically ventilated with a 7.5 ETT 
noted 23 cm at the lip line with following settings: AC 24  FiO2 75% Peep 0. FiO2 
titrated down to 65 % at this time. SpO2 97%. Right upper lung lobe noted with crackles; 
left upper lung lobe noted with rhonchi; bilat lower lobes diminished upon auscultation. 
Chest tube noted from anterior right upper chest (thoravent to pleurevac drainage/ to wall 
suction) - water level adjusted per order - no air leak present.  serous drainage noted in 
collection chamber. Abd is round, soft, and non-tender with active bowel sounds to all 
quadrants. Pt has a GT clamped at this time for recent Synthroid administration. Will resume 
shortly. F/C noted draining yellow, clear urine. Skin alterations noted. Pt on STEPH mattress. 
Pt has a CARLOS PICC with dry and intact dressing running 1/2 NS at 50 cc/hr. BSW restraints 
noted; radial pulses palpable; skin to both wrists intact without redness. Bed in lowest 
position, alarm on, side rails up x 2 and padded per seizure precaution. Call light within 
reach. Will continue to monitor. 

-------------------------------------------------------------------------------

Addendum: 10/06/20 at 0825 by Rylie Smith RN

-------------------------------------------------------------------------------

Late entry: chest tube to low intermittent suction at this time per Dr Uriostegui's order (change 
to intermittent suction once PNX resolves).

## 2020-10-06 NOTE — NUR
NURSE NOTES:

Pt repositioned; oral care provided; afebrile; no distress noted.

Seen by Dr Nino; labs reviewed.

## 2020-10-06 NOTE — NUR
NOTE



AUDREY spoke w/ Annetta [phone] that pt will remain full code as bioethics committee 
recommended pt remaining full code. Denilson reports her team already provided consent for 
tracheostomy.

## 2020-10-06 NOTE — NUR
NURSE HAND-OFF REPORT: 



Latest Vital Signs: Temperature 98.2 , Pulse 61 , B/P 98 /57 , Respiratory Rate 24 , O2 SAT 
95 , Mechanical Ventilator, FiO2 55%, spO2 96%.

Vital Sign Comment: stable



EKG Rhythm: Sinus Rhythm

Rhythm change?: N 

MD Notified?: n/a

MD Response: N/a



Latest Momin Fall Score: 70  

Fall Risk: High Risk 

Safety Measures: Call light Within Reach, Bed Alarm Zone 1, Side Rails Side Rails x3, Bed 
position Low and Locked.

Fall Precautions: 

Yellow Socks

Door Sign

Patient Fall Education



Report given to Lucie Floyd RN.

## 2020-10-06 NOTE — SURGERY PROGRESS NOTE
Surgery Progress Note


Subjective


Additional Comments


pleuravac troubleshooting with RN


recommend change to new vac


not ready for trach





Objective





Last 24 Hour Vital Signs








  Date Time  Temp Pulse Resp B/P (MAP) Pulse Ox O2 Delivery O2 Flow Rate FiO2


 


10/6/20 14:00  61 24 120/63 (82) 97   


 


10/6/20 13:00  63 23 117/57 (77) 98   


 


10/6/20 12:00        65


 


10/6/20 12:00  64      


 


10/6/20 12:00 98.5 65 20 119/50 (73) 96   


 


10/6/20 12:00      Mechanical Ventilator  





      Mechanical Ventilator  





      Mechanical Ventilator  


 


10/6/20 11:00  64 22 114/48 (70) 98   


 


10/6/20 11:00  62 24     65


 


10/6/20 10:00  64 27 101/50 (67) 97   


 


10/6/20 09:00  66 18 112/47 (68) 89   


 


10/6/20 08:00      Mechanical Ventilator  





      Mechanical Ventilator  





      Mechanical Ventilator  


 


10/6/20 08:00 98.2 66 29 101/47 (65) 96   


 


10/6/20 08:00        65


 


10/6/20 08:00  64      


 


10/6/20 07:00  71 30 90/56 (67) 96   


 


10/6/20 06:00  63 36 93/39 (57) 96   


 


10/6/20 05:00  69 36 108/63 (78) 94   


 


10/6/20 04:00        75


 


10/6/20 04:00  69      


 


10/6/20 04:00 98.4 64 24 99/47 (64) 95   


 


10/6/20 04:00      Mechanical Ventilator  





      Mechanical Ventilator  





      Mechanical Ventilator  


 


10/6/20 03:08  56 24     75


 


10/6/20 03:00  61 24 104/47 (66) 96   


 


10/6/20 02:00  69 22 125/53 (77) 96   


 


10/6/20 01:00  62 27 128/44 (72) 98   


 


10/6/20 00:00 98.2 65 25 104/59 (74) 98   


 


10/6/20 00:00      Mechanical Ventilator  





      Mechanical Ventilator  





      Mechanical Ventilator  


 


10/6/20 00:00        75


 


10/6/20 00:00  65      


 


10/5/20 23:19  62 24     75


 


10/5/20 23:00  61 25 88/44 (59) 99   


 


10/5/20 22:00  64 25 100/37 (58) 97   


 


10/5/20 21:19  69 27 112/39 (63) 99   


 


10/5/20 21:00  64 24 84/41 (55) 98   


 


10/5/20 20:00  64      


 


10/5/20 20:00        75


 


10/5/20 20:00 98.0 66 25 97/58 (71) 97   


 


10/5/20 20:00      Mechanical Ventilator  





      Mechanical Ventilator  





      Mechanical Ventilator  


 


10/5/20 19:30  67 24     75


 


10/5/20 19:00  61 24 94/45 (61) 99   


 


10/5/20 18:00 98.8 62 24 86/38 (54) 98   


 


10/5/20 17:00 98.8 63 23 116/49 (71) 98   


 


10/5/20 16:00      Mechanical Ventilator  





      Mechanical Ventilator  





      Mechanical Ventilator  


 


10/5/20 16:00 98.8 64 27 112/40 (64) 97   


 


10/5/20 16:00  64      


 


10/5/20 15:20  64 24     75


 


10/5/20 15:00  63 26 101/53 (69) 99   








I&O











Intake and Output  


 


 10/5/20 10/6/20





 19:00 07:00


 


Intake Total 980 ml 1290 ml


 


Output Total 720 ml 920 ml


 


Balance 260 ml 370 ml


 


  


 


Free Water 30 ml 90 ml


 


IV Total 400 ml 600 ml


 


Tube Feeding 550 ml 600 ml


 


Output Urine Total 670 ml 850 ml


 


Chest Tube Drainage Total 50 ml 70 ml


 


# Bowel Movements 1 








Dressing:  other


Wound:  other


Cardiovascular:  RSR


Respiratory:  decreased breath sounds


Abdomen:  non-tender, present bowel sounds


Extremities:  edema, no cyanosis





Laboratory Tests








Test


 10/5/20


17:48 10/5/20


21:21 10/5/20


23:51 10/6/20


04:30


 


POC Whole Blood Glucose


 Pending  


 147 MG/DL


()  H 151 MG/DL


()  H 





 


White Blood Count


 


 


 


 9.6 K/UL


(4.8-10.8)


 


Red Blood Count


 


 


 


 2.58 M/UL


(4.20-5.40)  L


 


Hemoglobin


 


 


 


 8.2 G/DL


(12.0-16.0)  L


 


Hematocrit


 


 


 


 24.8 %


(37.0-47.0)  L


 


Mean Corpuscular Volume    96 FL (80-99)  


 


Mean Corpuscular Hemoglobin


 


 


 


 31.9 PG


(27.0-31.0)  H


 


Mean Corpuscular Hemoglobin


Concent 


 


 


 33.2 G/DL


(32.0-36.0)


 


Red Cell Distribution Width


 


 


 


 18.1 %


(11.6-14.8)  H


 


Platelet Count


 


 


 


 89 K/UL


(150-450)  L


 


Mean Platelet Volume


 


 


 


 8.7 FL


(6.5-10.1)


 


Neutrophils (%) (Auto)


 


 


 


 % (45.0-75.0)





 


Lymphocytes (%) (Auto)


 


 


 


 % (20.0-45.0)





 


Monocytes (%) (Auto)     % (1.0-10.0)  


 


Eosinophils (%) (Auto)     % (0.0-3.0)  


 


Basophils (%) (Auto)     % (0.0-2.0)  


 


Differential Total Cells


Counted 


 


 


 100  





 


Neutrophils % (Manual)    80 % (45-75)  H


 


Lymphocytes % (Manual)    14 % (20-45)  L


 


Monocytes % (Manual)    4 % (1-10)  


 


Eosinophils % (Manual)    1 % (0-3)  


 


Basophils % (Manual)    0 % (0-2)  


 


Band Neutrophils    1 % (0-8)  


 


Platelet Estimate    Decreased  L


 


Platelet Morphology    Normal  


 


Hypochromasia    1+  


 


Anisocytosis    2+  


 


Erythrocyte Sedimentation Rate


 


 


 


 52 MM/HR


(0-30)  H


 


Sodium Level


 


 


 


 144 MMOL/L


(136-145)


 


Potassium Level


 


 


 


 3.7 MMOL/L


(3.5-5.1)


 


Chloride Level


 


 


 


 112 MMOL/L


()  H


 


Carbon Dioxide Level


 


 


 


 29 MMOL/L


(21-32)


 


Blood Urea Nitrogen


 


 


 


 20 mg/dL


(7-18)  H


 


Creatinine


 


 


 


 0.6 MG/DL


(0.55-1.30)


 


Estimat Glomerular Filtration


Rate 


 


 


 > 60 mL/min


(>60)


 


Glucose Level


 


 


 


 164 MG/DL


()  H


 


Calcium Level


 


 


 


 6.9 MG/DL


(8.5-10.1)  L


 


Phosphorus Level


 


 


 


 1.5 MG/DL


(2.5-4.9)  L


 


Magnesium Level


 


 


 


 1.8 MG/DL


(1.8-2.4)


 


Total Bilirubin


 


 


 


 0.7 MG/DL


(0.2-1.0)


 


Aspartate Amino Transf


(AST/SGOT) 


 


 


 20 U/L (15-37)





 


Alanine Aminotransferase


(ALT/SGPT) 


 


 


 18 U/L (12-78)





 


Alkaline Phosphatase


 


 


 


 51 U/L


()


 


C-Reactive Protein,


Quantitative 


 


 


 15.5 mg/dL


(0.00-0.90)  H


 


Total Protein


 


 


 


 4.3 G/DL


(6.4-8.2)  L


 


Albumin


 


 


 


 1.1 G/DL


(3.4-5.0)  L


 


Globulin    3.2 g/dL  


 


Albumin/Globulin Ratio


 


 


 


 0.3 (1.0-2.7)


L


 


Test


 10/6/20


12:13 


 


 





 


POC Whole Blood Glucose


 128 MG/DL


()  H 


 


 














Plan


Problems:  


(1) Respiratory distress


Assessment & Plan:  Respiratory insufficiency requiring prolonged ventilator 

support.  Patient on minimal vent settings right now currently in stable but 

unfortunately not safe for extubation.  Tracheostomy is indicated recommended.  

I discussed case with pulmonology team ICU team medical teams.  Patient unable 

to make decisions and her current condition and given her history.  The care 

team has been contacted and will be reviewed for evaluation and consideration of

the tracheostomy.  If consented I think it is reasonable for tracheostomy given 

patient's current CODE STATUS care plan and goals of care.  Extubation his 

current status is potentially high risk for reintubation emergency complication.

 We will plan for tracheostomy if consent is obtained.  Thank you for let me 

participate patient's care will follow with recommendations





will plan for trach when ready


wean fi02 





(2) Encephalopathy chronic


(3) Dysphagia


(4) Down's syndrome


(5) Sepsis


(6) Acute respiratory failure


(7) Pneumonia


(8) Trisomy 21, Down syndrome


(9) DAVID (acute kidney injury)


(10) Bacteremia


(11) Hypokalemia


(12) Anemia


(13) Diarrhea


(14) Hypernatremia


(15) Pneumothorax


(16) Pneumothorax, right


Assessment & Plan:  right ptx.  s/p chest tube


tube removed 10/3





left ptx tube placed 10/2





(17) Cardiac arrest


(18) ARDS (adult respiratory distress syndrome)


(19) Septic shock


(20) HCAP (healthcare-associated pneumonia)


(21) Respiratory failure requiring intubation


(22) Seizure disorder


(23) Hypothyroidism











Jake Aranda                 Oct 6, 2020 14:37

## 2020-10-06 NOTE — INTERNAL MED PROGRESS NOTE
Subjective


Date of Service:  Oct 6, 2020


Physician Name


Sanya Schultz


Attending Physician


Chano Cherry MD





Current Medications








 Medications


  (Trade)  Dose


 Ordered  Sig/Didi


 Route


 PRN Reason  Start Time


 Stop Time Status Last Admin


Dose Admin


 


 Acetaminophen


  (Tylenol)  650 mg  Q4H  PRN


 GT


 For Pain  9/23/20 17:15


 10/23/20 17:14  10/2/20 17:46





 


 Chlorhexidine


 Gluconate


  (Beatrice-Hex 2%)  1 applic  DAILY@2000


 TOPIC


   8/31/20 20:00


 11/29/20 19:59  10/5/20 20:21





 


 Clotrimazole


  (Lotrimin)  1 applic  Q12HR


 TOPIC


   8/30/20 13:00


 11/28/20 12:59  10/6/20 09:22





 


 Dextrose


  (Dextrose 50%)  25 ml  Q30M  PRN


 IV


 Hypoglycemia  8/26/20 11:30


 11/20/20 11:29   





 


 Dextrose


  (Dextrose 50%)  50 ml  Q30M  PRN


 IV


 Hypoglycemia  8/26/20 11:30


 11/20/20 11:29   





 


 Hydrocortisone


  (Solu-CORTEF)  50 mg  EVERY 12  HOURS


 IV


   10/2/20 21:00


 12/27/20 20:59  10/6/20 09:21





 


 Insulin Aspart


  (NovoLOG)    Q6HR


 SUBQ


   9/18/20 12:00


 12/17/20 11:59  10/6/20 06:36





 


 Insulin Detemir


  (Levemir)  10 units  Q12HR


 SUBQ


   9/18/20 10:00


 12/17/20 09:59  10/6/20 09:45





 


 Levothyroxine


 Sodium


  (Synthroid)  75 mcg  DAILY@0630


 GT


   9/30/20 06:30


 10/30/20 06:29  10/6/20 06:35





 


 Loperamide HCl


  (Imodium)  2 mg  Q6H  PRN


 NG


 Diarrhea  9/16/20 10:30


 10/16/20 10:29  9/23/20 11:41





 


 Lorazepam


  (Ativan 2mg/ml


 1ml)  2 mg  Q4H  PRN


 IV


 For Anxiety  10/5/20 00:45


 10/12/20 00:44  10/5/20 03:06





 


 Pantoprazole


  (Protonix)  40 mg  EVERY 12  HOURS


 IVP


   9/28/20 21:00


 10/28/20 20:59  10/6/20 09:21





 


 Potassium


 Phosphate 20 mm/


 Sodium Chloride  281.6667


 ml @ 


 46.944 m...  ONCE  ONCE


 IV


   10/6/20 14:00


 10/6/20 19:59  10/6/20 14:41





 


 Potassium Chloride


  (K-Dur)  40 meq  EVERY 12  HOURS


 GT


   9/26/20 21:00


 12/19/20 12:14  10/6/20 09:21





 


 Sodium Chloride  1,000 ml @ 


 50 mls/hr  Q20H


 IV


   10/4/20 11:00


 11/3/20 10:59  10/6/20 01:27











Allergies:  


Coded Allergies:  


     No Known Allergies (Unverified , 1/8/19)


ROS Limited/Unobtainable:  Yes


Subjective


58 YO F with Down's syndrome admitted with hypoxia.  Now sepsis and pneumonia.  

Cover for Int Med-DR Hung.  ICU.  Intubated and sedated





Objective





Last Vital Signs








  Date Time  Temp Pulse Resp B/P (MAP) Pulse Ox O2 Delivery O2 Flow Rate FiO2


 


10/6/20 17:00  59 22 99/44 (62) 94   


 


10/6/20 16:00        55


 


10/6/20 16:00      Mechanical Ventilator  





      Mechanical Ventilator  





      Mechanical Ventilator  


 


10/6/20 16:00 98.2       











Laboratory Tests








Test


 10/5/20


17:48 10/5/20


21:21 10/5/20


23:51 10/6/20


04:30


 


POC Whole Blood Glucose


 Pending  


 147 MG/DL


()  H 151 MG/DL


()  H 





 


White Blood Count


 


 


 


 9.6 K/UL


(4.8-10.8)


 


Red Blood Count


 


 


 


 2.58 M/UL


(4.20-5.40)  L


 


Hemoglobin


 


 


 


 8.2 G/DL


(12.0-16.0)  L


 


Hematocrit


 


 


 


 24.8 %


(37.0-47.0)  L


 


Mean Corpuscular Volume    96 FL (80-99)  


 


Mean Corpuscular Hemoglobin


 


 


 


 31.9 PG


(27.0-31.0)  H


 


Mean Corpuscular Hemoglobin


Concent 


 


 


 33.2 G/DL


(32.0-36.0)


 


Red Cell Distribution Width


 


 


 


 18.1 %


(11.6-14.8)  H


 


Platelet Count


 


 


 


 89 K/UL


(150-450)  L


 


Mean Platelet Volume


 


 


 


 8.7 FL


(6.5-10.1)


 


Neutrophils (%) (Auto)


 


 


 


 % (45.0-75.0)





 


Lymphocytes (%) (Auto)


 


 


 


 % (20.0-45.0)





 


Monocytes (%) (Auto)     % (1.0-10.0)  


 


Eosinophils (%) (Auto)     % (0.0-3.0)  


 


Basophils (%) (Auto)     % (0.0-2.0)  


 


Differential Total Cells


Counted 


 


 


 100  





 


Neutrophils % (Manual)    80 % (45-75)  H


 


Lymphocytes % (Manual)    14 % (20-45)  L


 


Monocytes % (Manual)    4 % (1-10)  


 


Eosinophils % (Manual)    1 % (0-3)  


 


Basophils % (Manual)    0 % (0-2)  


 


Band Neutrophils    1 % (0-8)  


 


Platelet Estimate    Decreased  L


 


Platelet Morphology    Normal  


 


Hypochromasia    1+  


 


Anisocytosis    2+  


 


Erythrocyte Sedimentation Rate


 


 


 


 52 MM/HR


(0-30)  H


 


Sodium Level


 


 


 


 144 MMOL/L


(136-145)


 


Potassium Level


 


 


 


 3.7 MMOL/L


(3.5-5.1)


 


Chloride Level


 


 


 


 112 MMOL/L


()  H


 


Carbon Dioxide Level


 


 


 


 29 MMOL/L


(21-32)


 


Blood Urea Nitrogen


 


 


 


 20 mg/dL


(7-18)  H


 


Creatinine


 


 


 


 0.6 MG/DL


(0.55-1.30)


 


Estimat Glomerular Filtration


Rate 


 


 


 > 60 mL/min


(>60)


 


Glucose Level


 


 


 


 164 MG/DL


()  H


 


Calcium Level


 


 


 


 6.9 MG/DL


(8.5-10.1)  L


 


Phosphorus Level


 


 


 


 1.5 MG/DL


(2.5-4.9)  L


 


Magnesium Level


 


 


 


 1.8 MG/DL


(1.8-2.4)


 


Total Bilirubin


 


 


 


 0.7 MG/DL


(0.2-1.0)


 


Aspartate Amino Transf


(AST/SGOT) 


 


 


 20 U/L (15-37)





 


Alanine Aminotransferase


(ALT/SGPT) 


 


 


 18 U/L (12-78)





 


Alkaline Phosphatase


 


 


 


 51 U/L


()


 


C-Reactive Protein,


Quantitative 


 


 


 15.5 mg/dL


(0.00-0.90)  H


 


Total Protein


 


 


 


 4.3 G/DL


(6.4-8.2)  L


 


Albumin


 


 


 


 1.1 G/DL


(3.4-5.0)  L


 


Globulin    3.2 g/dL  


 


Albumin/Globulin Ratio


 


 


 


 0.3 (1.0-2.7)


L


 


Test


 10/6/20


12:13 


 


 





 


POC Whole Blood Glucose


 128 MG/DL


()  H 


 


 




















Intake and Output  


 


 10/5/20 10/6/20





 19:00 07:00


 


Intake Total 980 ml 1290 ml


 


Output Total 720 ml 920 ml


 


Balance 260 ml 370 ml


 


  


 


Free Water 30 ml 90 ml


 


IV Total 400 ml 600 ml


 


Tube Feeding 550 ml 600 ml


 


Output Urine Total 670 ml 850 ml


 


Chest Tube Drainage Total 50 ml 70 ml


 


# Bowel Movements 1 








Objective


General Appearance:  WD/WN, no apparent distress, alert


EENT:  PERRL/EOMI, normal ENT inspection


Neck:  non-tender, normal alignment, supple, normal inspection


Cardiovascular:  normal peripheral pulses, normal rate, regular rhythm, no 

gallop/murmur, no JVD


Respiratory/Chest:  Mech vent; decreased breath sounds, crackles/rales, rhonchi 

- bilaterally, expiratory wheezing


Abdomen:  normal bowel sounds, non tender, soft, no organomegaly, no mass


Extremities:  normal range of motion


Neurologic:  CNs II-XII grossly normal


Skin:  normal pigmentation, warm/dry





Assessment/Plan


Problem List:  


(1) HCAP (healthcare-associated pneumonia)


Assessment & Plan:  Strep Group G.  S/P amikacin per ID=Dr Gomes.  

Pulmonary/Critical care=DR Cherry.  COVID NEG





(2) Sepsis


Assessment & Plan:  Staph haemolyticus.  S/P amikacin per ID=Dr Gomes





(3) Down's syndrome


(4) Dysphagia


Assessment & Plan:  S/P PEG





(5) Seizure disorder


Assessment & Plan:  Continue keppra and depakote





(6) Hypothyroidism


Assessment & Plan:  Continue synthroid





(7) Acute respiratory failure


Assessment & Plan:  Pulmonary = Dr Cherry; mech vent





(8) Pneumothorax, right


Assessment & Plan:  Continue chest tube per pulmonary





(9) Cardiac arrest


Assessment & Plan:  8/26/20-see cardiology note=Sanya Dunn MD                Oct 6, 2020 17:17

## 2020-10-06 NOTE — GENERAL PROGRESS NOTE
Subjective


ROS Limited/Unobtainable:  No


Allergies:  


Coded Allergies:  


     No Known Allergies (Unverified , 1/8/19)





Objective





Last 24 Hour Vital Signs








  Date Time  Temp Pulse Resp B/P (MAP) Pulse Ox O2 Delivery O2 Flow Rate FiO2


 


10/6/20 11:00  64 22 114/48 (70) 98   


 


10/6/20 11:00  62 24     65


 


10/6/20 10:00  64 27 101/50 (67) 97   


 


10/6/20 09:00  66 18 112/47 (68) 89   


 


10/6/20 08:00      Mechanical Ventilator  





      Mechanical Ventilator  





      Mechanical Ventilator  


 


10/6/20 08:00 98.2 66 29 101/47 (65) 96   


 


10/6/20 08:00        65


 


10/6/20 08:00  64      


 


10/6/20 07:00  71 30 90/56 (67) 96   


 


10/6/20 06:00  63 36 93/39 (57) 96   


 


10/6/20 05:00  69 36 108/63 (78) 94   


 


10/6/20 04:00        75


 


10/6/20 04:00  69      


 


10/6/20 04:00 98.4 64 24 99/47 (64) 95   


 


10/6/20 04:00      Mechanical Ventilator  





      Mechanical Ventilator  





      Mechanical Ventilator  


 


10/6/20 03:08  56 24     75


 


10/6/20 03:00  61 24 104/47 (66) 96   


 


10/6/20 02:00  69 22 125/53 (77) 96   


 


10/6/20 01:00  62 27 128/44 (72) 98   


 


10/6/20 00:00 98.2 65 25 104/59 (74) 98   


 


10/6/20 00:00      Mechanical Ventilator  





      Mechanical Ventilator  





      Mechanical Ventilator  


 


10/6/20 00:00        75


 


10/6/20 00:00  65      


 


10/5/20 23:19  62 24     75


 


10/5/20 23:00  61 25 88/44 (59) 99   


 


10/5/20 22:00  64 25 100/37 (58) 97   


 


10/5/20 21:19  69 27 112/39 (63) 99   


 


10/5/20 21:00  64 24 84/41 (55) 98   


 


10/5/20 20:00  64      


 


10/5/20 20:00        75


 


10/5/20 20:00 98.0 66 25 97/58 (71) 97   


 


10/5/20 20:00      Mechanical Ventilator  





      Mechanical Ventilator  





      Mechanical Ventilator  


 


10/5/20 19:30  67 24     75


 


10/5/20 19:00  61 24 94/45 (61) 99   


 


10/5/20 18:00 98.8 62 24 86/38 (54) 98   


 


10/5/20 17:00 98.8 63 23 116/49 (71) 98   


 


10/5/20 16:00      Mechanical Ventilator  





      Mechanical Ventilator  





      Mechanical Ventilator  


 


10/5/20 16:00 98.8 64 27 112/40 (64) 97   


 


10/5/20 16:00  64      


 


10/5/20 15:20  64 24     75


 


10/5/20 15:00  63 26 101/53 (69) 99   


 


10/5/20 14:00  63 30 90/47 (61) 98   


 


10/5/20 13:50        75


 


10/5/20 13:00  59 34 95/42 (59) 100   

















Intake and Output  


 


 10/5/20 10/6/20





 19:00 07:00


 


Intake Total 980 ml 1290 ml


 


Output Total 720 ml 920 ml


 


Balance 260 ml 370 ml


 


  


 


Free Water 30 ml 90 ml


 


IV Total 400 ml 600 ml


 


Tube Feeding 550 ml 600 ml


 


Output Urine Total 670 ml 850 ml


 


Chest Tube Drainage Total 50 ml 70 ml


 


# Bowel Movements 1 








Laboratory Tests


10/5/20 13:30: 


Urine Color Pale yellow, Urine Appearance Clear, Urine pH 8, Urine Specific 

Gravity 1.010, Urine Protein 1+H, Urine Glucose (UA) Negative, Urine Ketones 

Negative, Urine Blood 2+H, Urine Nitrite Negative, Urine Bilirubin Negative, 

Urine Urobilinogen 12H, Urine Leukocyte Esterase 2+H, Urine RBC 5-10H, Urine WBC

10-15H, Urine Squamous Epithelial Cells Few, Urine Bacteria ModerateH


10/5/20 17:48: POC Whole Blood Glucose [Pending]


10/5/20 21:21: POC Whole Blood Glucose 147H


10/5/20 23:51: POC Whole Blood Glucose 151H


10/6/20 04:30: 


White Blood Count 9.6, Red Blood Count 2.58L, Hemoglobin 8.2L, Hematocrit 24.8L,

 Mean Corpuscular Volume 96, Mean Corpuscular Hemoglobin 31.9H, Mean Corpuscular

 Hemoglobin Concent 33.2, Red Cell Distribution Width 18.1H, Platelet Count 89L,

 Mean Platelet Volume 8.7, Neutrophils (%) (Auto) , Lymphocytes (%) (Auto) , 

Monocytes (%) (Auto) , Eosinophils (%) (Auto) , Basophils (%) (Auto) , 

Differential Total Cells Counted 100, Neutrophils % (Manual) 80H, Lymphocytes % 

(Manual) 14L, Monocytes % (Manual) 4, Eosinophils % (Manual) 1, Basophils % 

(Manual) 0, Band Neutrophils 1, Platelet Estimate DecreasedL, Platelet 

Morphology Normal, Hypochromasia 1+, Anisocytosis 2+, Erythrocyte Sedimentation 

Rate 52H, Sodium Level 144, Potassium Level 3.7, Chloride Level 112H, Carbon 

Dioxide Level 29, Blood Urea Nitrogen 20H, Creatinine 0.6, Estimat Glomerular 

Filtration Rate > 60, Glucose Level 164H, Calcium Level 6.9L, Phosphorus Level 

1.5L, Magnesium Level 1.8, Total Bilirubin 0.7, Aspartate Amino Transf 

(AST/SGOT) 20, Alanine Aminotransferase (ALT/SGPT) 18, Alkaline Phosphatase 51, 

C-Reactive Protein, Quantitative 15.5H, Total Protein 4.3L, Albumin 1.1L, 

Globulin 3.2, Albumin/Globulin Ratio 0.3L


10/6/20 12:13: POC Whole Blood Glucose 128H


Height (Feet):  5


Height (Inches):  3.00


Weight (Pounds):  172


General Appearance:  no apparent distress


EENT:  normal ENT inspection


Neck:  supple


Cardiovascular:  normal rate


Respiratory/Chest:  decreased breath sounds


Abdomen:  hypoactive bowel sounds


Extremities:  non-tender





Assessment/Plan


Status:  stable, not improved, unchanged


Assessment/Plan:


1. History of Down syndrome.


2. Dysphagia with G-tube.


3. Seizure disorder.


4. Hypothyroidism.


5. DAVID.


6. Pneumonia.


7. Sepsis.








fu H&H


prn blood transfusion to keep HGB above 7


ppi


GTF


hold GI procedures for now


pending possible Trach











Ted Nagy MD              Oct 6, 2020 12:46

## 2020-10-06 NOTE — NUR
NURSE NOTES:

Bilateral soft wrist restraints were re order for safety to avoid pulling out therapeutic 
devices. Will continue to monitor.

## 2020-10-07 VITALS — SYSTOLIC BLOOD PRESSURE: 99 MMHG | DIASTOLIC BLOOD PRESSURE: 47 MMHG

## 2020-10-07 VITALS — SYSTOLIC BLOOD PRESSURE: 116 MMHG | DIASTOLIC BLOOD PRESSURE: 53 MMHG

## 2020-10-07 VITALS — SYSTOLIC BLOOD PRESSURE: 119 MMHG | DIASTOLIC BLOOD PRESSURE: 58 MMHG

## 2020-10-07 VITALS — DIASTOLIC BLOOD PRESSURE: 60 MMHG | SYSTOLIC BLOOD PRESSURE: 135 MMHG

## 2020-10-07 VITALS — DIASTOLIC BLOOD PRESSURE: 54 MMHG | SYSTOLIC BLOOD PRESSURE: 127 MMHG

## 2020-10-07 VITALS — DIASTOLIC BLOOD PRESSURE: 56 MMHG | SYSTOLIC BLOOD PRESSURE: 115 MMHG

## 2020-10-07 VITALS — SYSTOLIC BLOOD PRESSURE: 104 MMHG | DIASTOLIC BLOOD PRESSURE: 36 MMHG

## 2020-10-07 VITALS — DIASTOLIC BLOOD PRESSURE: 53 MMHG | SYSTOLIC BLOOD PRESSURE: 126 MMHG

## 2020-10-07 VITALS — DIASTOLIC BLOOD PRESSURE: 45 MMHG | SYSTOLIC BLOOD PRESSURE: 104 MMHG

## 2020-10-07 VITALS — DIASTOLIC BLOOD PRESSURE: 58 MMHG | SYSTOLIC BLOOD PRESSURE: 134 MMHG

## 2020-10-07 VITALS — DIASTOLIC BLOOD PRESSURE: 54 MMHG | SYSTOLIC BLOOD PRESSURE: 95 MMHG

## 2020-10-07 VITALS — DIASTOLIC BLOOD PRESSURE: 45 MMHG | SYSTOLIC BLOOD PRESSURE: 108 MMHG

## 2020-10-07 VITALS — SYSTOLIC BLOOD PRESSURE: 105 MMHG | DIASTOLIC BLOOD PRESSURE: 47 MMHG

## 2020-10-07 VITALS — DIASTOLIC BLOOD PRESSURE: 56 MMHG | SYSTOLIC BLOOD PRESSURE: 130 MMHG

## 2020-10-07 VITALS — DIASTOLIC BLOOD PRESSURE: 52 MMHG | SYSTOLIC BLOOD PRESSURE: 113 MMHG

## 2020-10-07 VITALS — SYSTOLIC BLOOD PRESSURE: 113 MMHG | DIASTOLIC BLOOD PRESSURE: 59 MMHG

## 2020-10-07 VITALS — SYSTOLIC BLOOD PRESSURE: 110 MMHG | DIASTOLIC BLOOD PRESSURE: 49 MMHG

## 2020-10-07 VITALS — DIASTOLIC BLOOD PRESSURE: 50 MMHG | SYSTOLIC BLOOD PRESSURE: 113 MMHG

## 2020-10-07 VITALS — SYSTOLIC BLOOD PRESSURE: 123 MMHG | DIASTOLIC BLOOD PRESSURE: 53 MMHG

## 2020-10-07 VITALS — DIASTOLIC BLOOD PRESSURE: 46 MMHG | SYSTOLIC BLOOD PRESSURE: 94 MMHG

## 2020-10-07 VITALS — SYSTOLIC BLOOD PRESSURE: 99 MMHG | DIASTOLIC BLOOD PRESSURE: 44 MMHG

## 2020-10-07 VITALS — DIASTOLIC BLOOD PRESSURE: 53 MMHG | SYSTOLIC BLOOD PRESSURE: 117 MMHG

## 2020-10-07 VITALS — DIASTOLIC BLOOD PRESSURE: 54 MMHG | SYSTOLIC BLOOD PRESSURE: 110 MMHG

## 2020-10-07 LAB
ADD MANUAL DIFF: NO
ALBUMIN SERPL-MCNC: 1.1 G/DL (ref 3.4–5)
ALBUMIN/GLOB SERPL: 0.3 {RATIO} (ref 1–2.7)
ALP SERPL-CCNC: 51 U/L (ref 46–116)
ALT SERPL-CCNC: 19 U/L (ref 12–78)
ANION GAP SERPL CALC-SCNC: 8 MMOL/L (ref 5–15)
AST SERPL-CCNC: 18 U/L (ref 15–37)
BILIRUB SERPL-MCNC: 0.6 MG/DL (ref 0.2–1)
BUN SERPL-MCNC: 16 MG/DL (ref 7–18)
CALCIUM SERPL-MCNC: 6.9 MG/DL (ref 8.5–10.1)
CHLORIDE SERPL-SCNC: 111 MMOL/L (ref 98–107)
CO2 SERPL-SCNC: 26 MMOL/L (ref 21–32)
CREAT SERPL-MCNC: 0.6 MG/DL (ref 0.55–1.3)
ERYTHROCYTE [DISTWIDTH] IN BLOOD BY AUTOMATED COUNT: 18 % (ref 11.6–14.8)
GLOBULIN SER-MCNC: 3.3 G/DL
HCT VFR BLD CALC: 22.3 % (ref 37–47)
HGB BLD-MCNC: 7.5 G/DL (ref 12–16)
MCV RBC AUTO: 95 FL (ref 80–99)
PHOSPHATE SERPL-MCNC: 2.5 MG/DL (ref 2.5–4.9)
PLATELET # BLD: 74 K/UL (ref 150–450)
POTASSIUM SERPL-SCNC: 3.7 MMOL/L (ref 3.5–5.1)
RBC # BLD AUTO: 2.35 M/UL (ref 4.2–5.4)
SODIUM SERPL-SCNC: 145 MMOL/L (ref 136–145)
WBC # BLD AUTO: 7.5 K/UL (ref 4.8–10.8)

## 2020-10-07 RX ADMIN — PANTOPRAZOLE SODIUM SCH MG: 40 INJECTION, POWDER, FOR SOLUTION INTRAVENOUS at 08:27

## 2020-10-07 RX ADMIN — HYDROCORTISONE SODIUM SUCCINATE SCH MG: 100 INJECTION, POWDER, FOR SOLUTION INTRAMUSCULAR; INTRAVENOUS at 20:04

## 2020-10-07 RX ADMIN — HYDROCORTISONE SODIUM SUCCINATE SCH MG: 100 INJECTION, POWDER, FOR SOLUTION INTRAMUSCULAR; INTRAVENOUS at 08:27

## 2020-10-07 RX ADMIN — INSULIN ASPART SCH UNITS: 100 INJECTION, SOLUTION INTRAVENOUS; SUBCUTANEOUS at 06:10

## 2020-10-07 RX ADMIN — INSULIN DETEMIR SCH UNITS: 100 INJECTION, SOLUTION SUBCUTANEOUS at 09:07

## 2020-10-07 RX ADMIN — INSULIN ASPART SCH UNITS: 100 INJECTION, SOLUTION INTRAVENOUS; SUBCUTANEOUS at 00:00

## 2020-10-07 RX ADMIN — CHLORHEXIDINE GLUCONATE SCH APPLIC: 213 SOLUTION TOPICAL at 20:04

## 2020-10-07 RX ADMIN — INSULIN ASPART SCH UNITS: 100 INJECTION, SOLUTION INTRAVENOUS; SUBCUTANEOUS at 23:56

## 2020-10-07 RX ADMIN — INSULIN DETEMIR SCH UNITS: 100 INJECTION, SOLUTION SUBCUTANEOUS at 20:10

## 2020-10-07 RX ADMIN — PANTOPRAZOLE SODIUM SCH MG: 40 INJECTION, POWDER, FOR SOLUTION INTRAVENOUS at 20:04

## 2020-10-07 RX ADMIN — INSULIN ASPART SCH UNITS: 100 INJECTION, SOLUTION INTRAVENOUS; SUBCUTANEOUS at 12:20

## 2020-10-07 RX ADMIN — INSULIN ASPART SCH UNITS: 100 INJECTION, SOLUTION INTRAVENOUS; SUBCUTANEOUS at 18:41

## 2020-10-07 NOTE — ANETHESIA PREOPERATIVE EVAL
Anesthesia Pre-op PMH/ROS


General


Date of Evaluation:  Oct 7, 2020


Time of Evaluation:  15:25


Anesthesiologist:  robi


ASA Score:  ASA 4


Mallampati Score


Class I : Soft palate, uvula, fauces, pillars visible


Class II: Soft palate, uvula, fauces visible


Class III: Soft palate, base of uvula visible


Class IV: Only hard plate visible


Mallampati Classification:  Class III


Surgeon:  Rosi


Diagnosis:  Resp Failure


Surgical Procedure:  Tracheostomy


Anesthesia History:  none


Family History:  no anesthesia problems


Allergies:  


Coded Allergies:  


     No Known Allergies (Unverified , 1/8/19)


Medications:  see eMAR


Patient NPO?:  Yes


NPO Date:  Oct 7, 2020


NPO Time:  00:01





Past Medical History


Cardiovascular:  Reports: HTN, CAD, other


Pulmonary:  Reports: other - covid neg; pneumonia; Resp Failure; 


   Denies: asthma, COPD, LUCIO


Gastrointestinal/Genitourinary:  Reports: GERD, CRI, other - Dysphagia; 


   Denies: ESRD


Neurologic/Psychiatric:  Reports: other - Downs Syndrome; 


   Denies: dementia, CVA, depression/anxiety, TIA


Endocrine:  Reports: hypothyroidism


HEENT:  Denies: cataract (L), cataract (R), glaucoma, Tribal (L), Tribal (R), other


Hematology/Immune:  Reports: anemia


Musculoskeletal/Integumentary:  Reports: other - Seizure ; 


   Denies: OA, RA, DJD, DDD, edema


PMH Narrative:


(1) HCAP (healthcare-associated pneumonia)





(2) Se


(3) Down's syndrome


(4) Dysphagia


Assessment & Plan:  S/P PEG





(5) Seizure disorder





(6) Hypothyroidism





(7) Acute respiratory failure





(8) Pneumothorax, right





(9) Cardiac arrest


Assessment & Plan:  8/26/20-see cardiology note=Dr Jorgensen


PSxH Narrative:


unknown





Anesthesia Pre-op Phys. Exam


Physician Exam





Last Vital Signs








  Date Time  Temp Pulse Resp B/P (MAP) Pulse Ox O2 Delivery O2 Flow Rate FiO2


 


10/7/20 15:00  56 28 94/46 (62) 94   


 


10/7/20 12:00        50


 


10/7/20 12:00      Mechanical Ventilator  





      Mechanical Ventilator  





      Mechanical Ventilator  


 


10/7/20 08:00 98.3       








Constitutional:  other - Resp Failure;


Neurologic:  other


Cardiovascular:  RRR


Respiratory:  other - Intubated





Airway Exam


Mallampati Classification


7.0 ETT orally intubated


Mallampati Score:  Class III


MO:  limited


ROM:  limited


Dentures:  no upper, no lower





Anesthesia Pre-op A/P


Labs





Hematology








Test


 10/7/20


04:00


 


White Blood Count


 7.5 K/UL


(4.8-10.8)


 


Red Blood Count


 2.35 M/UL


(4.20-5.40)  L


 


Hemoglobin


 7.5 G/DL


(12.0-16.0)  L


 


Hematocrit


 22.3 %


(37.0-47.0)  L


 


Mean Corpuscular Volume 95 FL (80-99)  


 


Mean Corpuscular Hemoglobin


 32.0 PG


(27.0-31.0)  H


 


Mean Corpuscular Hemoglobin


Concent 33.7 G/DL


(32.0-36.0)


 


Red Cell Distribution Width


 18.0 %


(11.6-14.8)  H


 


Platelet Count


 74 K/UL


(150-450)  L


 


Mean Platelet Volume


 7.7 FL


(6.5-10.1)


 


Neutrophils (%) (Auto)


 % (45.0-75.0)





 


Lymphocytes (%) (Auto)


 % (20.0-45.0)





 


Monocytes (%) (Auto)  % (1.0-10.0)  


 


Eosinophils (%) (Auto)  % (0.0-3.0)  


 


Basophils (%) (Auto)  % (0.0-2.0)  








Chemistry








Test


 10/7/20


00:28 10/7/20


04:00 10/7/20


04:13


 


POC Whole Blood Glucose


 121 MG/DL


()  H 


 146 MG/DL


()  H


 


Sodium Level


 


 145 MMOL/L


(136-145) 





 


Potassium Level


 


 3.7 MMOL/L


(3.5-5.1) 





 


Chloride Level


 


 111 MMOL/L


()  H 





 


Carbon Dioxide Level


 


 26 MMOL/L


(21-32) 





 


Anion Gap


 


 8 mmol/L


(5-15) 





 


Blood Urea Nitrogen


 


 16 mg/dL


(7-18) 





 


Creatinine


 


 0.6 MG/DL


(0.55-1.30) 





 


Estimat Glomerular Filtration


Rate 


 > 60 mL/min


(>60) 





 


Glucose Level


 


 156 MG/DL


()  H 





 


Calcium Level


 


 6.9 MG/DL


(8.5-10.1)  L 





 


Phosphorus Level


 


 2.5 MG/DL


(2.5-4.9) 





 


Magnesium Level


 


 1.6 MG/DL


(1.8-2.4)  L 





 


Total Bilirubin


 


 0.6 MG/DL


(0.2-1.0) 





 


Aspartate Amino Transf


(AST/SGOT) 


 18 U/L (15-37)


 





 


Alanine Aminotransferase


(ALT/SGPT) 


 19 U/L (12-78)


 





 


Alkaline Phosphatase


 


 51 U/L


() 





 


Total Protein


 


 4.4 G/DL


(6.4-8.2)  L 





 


Albumin


 


 1.1 G/DL


(3.4-5.0)  L 





 


Globulin  3.3 g/dL   


 


Albumin/Globulin Ratio


 


 0.3 (1.0-2.7)


L 














Risk Assessment & Plan


Assessment:


Reassess in am


Plan:


Melina Jernigan CRNA      Oct 7, 2020 15:26

## 2020-10-07 NOTE — NUR
NURSE HAND-OFF REPORT: 



Latest Vital Signs: Temperature 98.2 , Pulse 59 , B/P 110 /54 , Respiratory Rate 24 , O2 SAT 
95 , Mechanical Ventilator, O2 Flow Rate 15.0 .  

Vital Sign Comment: 



EKG Rhythm: Sinus Bradycardia

Rhythm change?: N 

MD Notified?: Y Jean-Pierre Carver MD Response: No New Orders Received



Latest Momin Fall Score: 70  

Fall Risk: High Risk 

Safety Measures: Call light Within Reach, Bed Alarm Zone 1, Side Rails Side Rails x3, Bed 
position Low and Locked.

Fall Precautions: 

Yellow Socks

Door Sign

Patient Fall Education



Report given to Lin TRAYLOR

## 2020-10-07 NOTE — NEPHROLOGY PROGRESS NOTE
Assessment/Plan


Problem List:  


(1) DAVID (acute kidney injury)


(2) Respiratory failure requiring intubation


(3) Down's syndrome


(4) Seizure disorder


(5) Hypothyroidism


Assessment





Acute renal failure, likely due to hypotension


Acute respiratory distress, hypoxia


Seizure disorder


Hypothyroidism


Down syndrome


Full code











Fluid challenge with IV fluids and albumin


Midodrine for BP above 100 systolic


Check TSH level


Check


Correct level


Monitor renal parameters


Urine studies


Per orders


Plan


October 7: On ventilator.  Left chest tube remains.  Full code.  Labs reviewed. 

Electrolyte abnormalities addressed.  Continue per consultants.


October 6: On ventilator.  Tracheostomy postponed because patient is clinically 

unstable.  Still have left chest tube draining.  Labs reviewed.  Electrolyte 

abnormalities addressed.  Continue per consultants.


October 5: Remains intubated on ventilator.  Discussed with RN.  Unclear if the 

patient is due for tracheostomy today or not.  Apparently the patient was 

reintubated again yesterday.  Patient has left chest tube.  Electrolyte abnor

malities addressed.


October 4: Remains intubated on ventilator.  Due for tracheostomy tomorrow.  

Left chest tube present.  Patient is full code.  Labs reviewed.  Continue as is.


October 3: Remains intubated on ventilator.  Due for tracheostomy October 5.  

Has left-sided chest tube.  Stable from renal standpoint to view.  Continue per 

consultants.


October 2: Remains intubated on ventilator.  Electrolyte abnormalities 

addressed.  Supplements ordered.


October 1: Intubated on ventilator.  Labs reviewed.  Potassium supplement given.

 Remains bradycardic.  Continue per consultants.


September 30: Labs reviewed.  Electrolyte abnormalities addressed.  Heart rate 

remains bradycardic.  Continue per cardiology.  Continue to monitor renal 

parameters and electrolytes.


September 29: Labs reviewed.  Electrolyte abnormalities addressed and replaced. 

Medication list reviewed.  Heart rate remains mid 50s.  Continue as is.


September 28: On ventilator.  Full code.  Heart rate low.  Will discontinue 

Midodrin.  Continue to rest.  Electrolytes within normal limit.  Discussed with 

RN.


September 27: Remains intubated.  Full code.  No plan for tracheostomy yet.  

Labs reviewed.  All acceptable.  Continue same management


September 26: Labs reviewed.  Discussed with RN.  Potassium and phosphorus and 

magnesium replacement ordered.  Hemoglobin 9.5.  Patient remains full code.  

Continue per consultants.


September 25: Lab reviewed.  Renal parameters stable.  Full code.  Intubated.  

Electrolyte abnormalities addressed and replacement done.


September 24: Lab reviewed.  Renal parameters stable.  Full code.  Intubated.  

Has right side chest tube.  Continue per consultants.


September 23: Lab reviewed.  Renal parameters stable.  Full code.  Has right 

chest tube.  Planning process for tracheostomy.  Defer to chest and general 

surgeon.


September 22: Labs reviewed.  Renal parameters stable.  Remains full code.  

Remains on ventilator.  Due for tracheostomy tomorrow.  Continue per 

consultants.  Patient has a right chest tube in place at this time.


September 21: Labs reviewed.  Electrolyte imbalances addressed and supplemented.

 Remains full code and on ventilator.  Continue to monitor renal parameters.  

Continue per consultants.


September 20: Labs reviewed.  Serum potassium again low today.  Potassium suppl

ement IV and through GT given.  Patient remains full code and is on ventilator. 

Continue per consultants.  Continue to monitor electrolytes and renal 

parameters.


September 19: Labs reviewed.  Abnormal electrolyte addressed.  Remains full 

code.  Remains vented.


September 18: Day 27 of hospitalization.  Full code.  Labs reviewed.  Hemoglobin

down to 7.5.  Electrolyte abnormalities addressed and corrections ordered.  

Continue to monitor renal parameters.  Continue per consultants.  Start on 

Levemir for blood sugar management.  Questioning continuation of hydrocortisone?


September 17: Labs reviewed.  Potassium, phosphorus, hemoglobin, are all low.  

Potassium and phosphorus IV replacement given.  Continue to monitor electrolytes

and CBC.  Patient remains full code.


September 16: Lab reviewed.  Low phosphorus low magnesium and low potassium was 

addressed.  Hemoglobin drifting lower.  Continue per consultants.  Patient 

remains full code.


September 15: Labs reviewed.  Patient continues to be on ventilator.  D5W for 

high sodium and also potassium chloride intravenously as supplement given.  

Hemoglobin 8.4.  Continue to monitor electrolytes and renal parameters.


September 14: Labs reviewed.  Low potassium and high sodium noted.  Hemoglobin 

8.1 stable.  Aim to correct abnormal electrolyte.  Continue rest.  Will give 2 

boluses of D5W 500 cc.


September 13: Lab reviewed.  Abnormal electrolytes noted and addressed.


September 12: Labs reviewed.  Potassium supplement given.  Patient remains full 

code.  Continue per consultants.


September 11: Lab reviewed.  Electrolyte abnormalities addressed.  Continue per 

pulmonary and ID.


September 10: Lab reviewed.  Status unchanged.  Serum sodium 151 unchanged.  

Stable from renal standpoint of view.


September 9: Labs reviewed.  Status quo.  D5W 500 cc IV ordered.  Continue to 

monitor renal parameters.


September 8: Status quo.  Labs reviewed.  Overall condition unchanged.  Patient 

was transfused and hemoglobin higher.  Continue current management.  Patient 

remains full code.


September 7: Status quo.  Overall condition poor.  Very low albumin.  Edematous.

 Hypotensive.  Hemoglobin lower.  Anemia work-up ordered.  I favor transfusion 2

units of packed RBCs.  Patient remains full code.  I favor supportive care only.

 Will discuss.


September 6: Electrolyte abnormalities addressed.  Serum creatinine lower.  

Continue per current management.


September 5: Status unchanged.  Lab reviewed.  Serum potassium 2.7.  IV 

potassium chloride ordered.  Serum creatinine low at 1.6 stable.  Blood pressure

90s systolic


September 4: Status quo.  Labs reviewed.  Renal parameters stable.  Serum 

creatinine down to 1.6.  Medication list reviewed.  Continues to be on 

midodrine.  Continue per consultants.


September 3: Status quo.  Labs reviewed.  Electrolytes adjusted.  Serum 

creatinine down to 1.8.  Continue per consultants.


September 2: Status quo.  Labs reviewed.  Phosphorus supplement IV given.  Serum

creatinine 2.  Continue per consultants.


September 1: Requires less pressors.  Albumin bolus given.  1 dose of Lasix IV o

rdered as the patient severely edematous.  Patient serum albumin is very low.  

Continue per consultants.


August 31: Continues to be intubated.  Labs reviewed.  Serum creatinine 1.9 

unchanged.  Blood pressure more stable.  Off 1 of the pressors.  Continue to 

monitor renal parameters.  Continue per consultants.  Patient now on 

hydrocortisone 100 mg every 8 hours.  Will decrease IV fluid.  Normal saline 

down to 50 cc an hour.


August 30: Intubated.  Labs reviewed.  Creatinine 1.9 unchanged.  Continue same 

treatment plan.  Per consultants.  Overall poor prognosis since the patient 

remains on pressors and her pulmonary status is worsening.


August 29: Remains intubated.  Labs reviewed.  Creatinine 1.9.  Blood pressure 

systolic 90s.  Continue per consultants.


August 28: Remains intubated.  Labs reviewed.  Serum creatinine lower to 2.  

Vancomycin level lower.  Remains hypotensive on pressors.  Will increase 

midodrine to 10 mg every 8 hours.  Continue per consultants.  Continue to 

monitor renal parameters.


August 27: Patient now in ICU.  Intubated.  On pressors.  Labs reviewed.  Will 

increase midodrine.  Aim to keep blood pressure over 100 systolic.  Will give 

albumin bolus.  Will check vancomycin level which was elevated when checked 

previously on August 24.  Will monitor renal parameters.  Continue per 

consultants.





Subjective


ROS Limited/Unobtainable:  Yes





Objective


Objective





Last 24 Hour Vital Signs








  Date Time  Temp Pulse Resp B/P (MAP) Pulse Ox O2 Delivery O2 Flow Rate FiO2


 


10/7/20 08:20        50


 


10/7/20 08:00        30


 


10/7/20 07:30  68 24     55


 


10/7/20 07:00  66 21 95/54 (68) 92   


 


10/7/20 06:00  82 24 126/53 (77) 94   


 


10/7/20 05:00  59 24 110/54 (72) 95   


 


10/7/20 04:00 98.2 59 23 99/44 (62) 96   


 


10/7/20 04:00        55


 


10/7/20 04:00      Mechanical Ventilator  





      Mechanical Ventilator  





      Mechanical Ventilator  


 


10/7/20 04:00  61      


 


10/7/20 03:20  59 24     55


 


10/7/20 03:00  60 24 104/36 (58) 96   


 


10/7/20 02:00  70 21 127/54 (78) 96   


 


10/7/20 01:00  61 24 119/58 (78) 96   


 


10/7/20 00:00 98.4 62 25 113/52 (72) 95   


 


10/7/20 00:00        55


 


10/7/20 00:00      Mechanical Ventilator  





      Mechanical Ventilator  





      Mechanical Ventilator  


 


10/7/20 00:00  63      


 


10/6/20 23:53  61 24     55


 


10/6/20 23:00  65 23 103/48 (66) 97   


 


10/6/20 22:00  68 24 105/52 (69) 94   


 


10/6/20 21:10  64 24     55


 


10/6/20 21:00  67 25 89/43 (58) 96   


 


10/6/20 20:00  65      


 


10/6/20 20:00        55


 


10/6/20 20:00      Mechanical Ventilator  





      Mechanical Ventilator  





      Mechanical Ventilator  


 


10/6/20 20:00 98.0 75 27 109/79 (89) 93   


 


10/6/20 19:00  61 24 98/57 (71) 95   


 


10/6/20 18:00  58 25 96/45 (62) 96   


 


10/6/20 17:00  59 22 99/44 (62) 94   


 


10/6/20 16:00        55


 


10/6/20 16:00  60      


 


10/6/20 16:00      Mechanical Ventilator  





      Mechanical Ventilator  





      Mechanical Ventilator  


 


10/6/20 16:00 98.2 62 25 101/44 (63) 97   


 


10/6/20 15:25  67 24     55


 


10/6/20 15:00  60 24 101/44 (63) 97   


 


10/6/20 14:00  61 24 120/63 (82) 97   


 


10/6/20 13:00  63 23 117/57 (77) 98   


 


10/6/20 12:00        65


 


10/6/20 12:00  64      


 


10/6/20 12:00 98.5 65 20 119/50 (73) 96   


 


10/6/20 12:00      Mechanical Ventilator  





      Mechanical Ventilator  





      Mechanical Ventilator  


 


10/6/20 11:32  67 24     65


 


10/6/20 11:00  64 22 114/48 (70) 98   


 


10/6/20 11:00  62 24     65


 


10/6/20 10:00  64 27 101/50 (67) 97   

















Intake and Output  


 


 10/6/20 10/7/20





 19:00 07:00


 


Intake Total 1427.776 ml 1100 ml


 


Output Total 820 ml 500 ml


 


Balance 607.776 ml 600 ml


 


  


 


Free Water 40 ml 50 ml


 


IV Total 787.776 ml 600 ml


 


Tube Feeding 600 ml 450 ml


 


Output Urine Total 720 ml 500 ml


 


Chest Tube Drainage Total 100 ml 


 


# Bowel Movements 2 3








Laboratory Tests


10/6/20 12:13: POC Whole Blood Glucose 128H


10/7/20 00:28: POC Whole Blood Glucose 121H


10/7/20 04:00: 


White Blood Count 7.5, Red Blood Count 2.35L, Hemoglobin 7.5L, Hematocrit 22.3L,

Mean Corpuscular Volume 95, Mean Corpuscular Hemoglobin 32.0H, Mean Corpuscular 

Hemoglobin Concent 33.7, Red Cell Distribution Width 18.0H, Platelet Count 74L, 

Mean Platelet Volume 7.7, Neutrophils (%) (Auto) , Lymphocytes (%) (Auto) , 

Monocytes (%) (Auto) , Eosinophils (%) (Auto) , Basophils (%) (Auto) , Sodium 

Level 145, Potassium Level 3.7, Chloride Level 111H, Carbon Dioxide Level 26, 

Anion Gap 8, Blood Urea Nitrogen 16, Creatinine 0.6, Estimat Glomerular 

Filtration Rate > 60, Glucose Level 156H, Calcium Level 6.9L, Phosphorus Level 

2.5, Magnesium Level 1.6L, Total Bilirubin 0.6, Aspartate Amino Transf 

(AST/SGOT) 18, Alanine Aminotransferase (ALT/SGPT) 19, Alkaline Phosphatase 51, 

Total Protein 4.4L, Albumin 1.1L, Globulin 3.3, Albumin/Globulin Ratio 0.3L


10/7/20 04:13: POC Whole Blood Glucose 146H


10/7/20 08:06: 


Arterial Blood pH 7.524H, Arterial Blood Partial Pressure CO2 29.1L, Arterial 

Blood Partial Pressure O2 100.2H, Arterial Blood HCO3 23.4, Arterial Blood 

Oxygen Saturation 97.6, Arterial Blood Base Excess 0.9, Cole Test Positive


Height (Feet):  5


Height (Inches):  3.00


Weight (Pounds):  172


General Appearance:  no apparent distress


Neck:  limited range of motion


Cardiovascular:  normal rate


Respiratory/Chest:  decreased breath sounds


Abdomen:  distended











Lam Nino MD             Oct 7, 2020 09:26

## 2020-10-07 NOTE — NUR
NURSE NOTES:

Received patient from LAYTON Sandoval. Patient resting in bed with no acute distress. Patient 
AOx0 with episodes of confusion; presents with spontaneous eye opening; able to track with 
eyes. Attached to monitor; vitals stable to baseline. Sinus arnold 55. Mechanically vented; 
ETT 7.5 at 24 cm at the lip; AC 30  PEEP 5 FIO2 50%. Left chest thoravac noted; 
draining to intermittent suction. GT flushed and patent; no residual noted; Vital AF running 
at 50cc/hr. Aspiration precautions observed. HOB raised >30 degrees. Left upper arm PICC 
noted; 1/2NS infusing at 50 ml/hr. Bilateral wrist restraints noted; patient free from 
injury.  Valle noted; draining well to gravity Seizure precautions observed; side rails 
padded. All safety measures met; bed locked at lowest position; bed alarm set to zone 2; 
side rails raised x3; call light within reach.

## 2020-10-07 NOTE — INTERNAL MED PROGRESS NOTE
Subjective


Date of Service:  Oct 7, 2020


Physician Name


Sanya Schultz


Attending Physician


Chano Cherry MD





Current Medications








 Medications


  (Trade)  Dose


 Ordered  Sig/Didi


 Route


 PRN Reason  Start Time


 Stop Time Status Last Admin


Dose Admin


 


 Acetaminophen


  (Tylenol)  650 mg  Q4H  PRN


 GT


 For Pain  9/23/20 17:15


 10/23/20 17:14  10/2/20 17:46





 


 Chlorhexidine


 Gluconate


  (Beatrice-Hex 2%)  1 applic  DAILY@2000


 TOPIC


   8/31/20 20:00


 11/29/20 19:59  10/6/20 20:20





 


 Clotrimazole


  (Lotrimin)  1 applic  Q12HR


 TOPIC


   8/30/20 13:00


 11/28/20 12:59  10/7/20 08:28





 


 Dextrose


  (Dextrose 50%)  25 ml  Q30M  PRN


 IV


 Hypoglycemia  8/26/20 11:30


 11/20/20 11:29   





 


 Dextrose


  (Dextrose 50%)  50 ml  Q30M  PRN


 IV


 Hypoglycemia  8/26/20 11:30


 11/20/20 11:29   





 


 Hydrocortisone


  (Solu-CORTEF)  50 mg  EVERY 12  HOURS


 IV


   10/2/20 21:00


 12/27/20 20:59  10/7/20 08:27





 


 Insulin Aspart


  (NovoLOG)    Q6HR


 SUBQ


   9/18/20 12:00


 12/17/20 11:59  10/7/20 12:20





 


 Insulin Detemir


  (Levemir)  10 units  Q12HR


 SUBQ


   9/18/20 10:00


 12/17/20 09:59  10/7/20 09:07





 


 Levothyroxine


 Sodium


  (Synthroid)  75 mcg  DAILY@0630


 GT


   9/30/20 06:30


 10/30/20 06:29  10/7/20 06:09





 


 Loperamide HCl


  (Imodium)  2 mg  Q6H  PRN


 NG


 Diarrhea  9/16/20 10:30


 10/16/20 10:29  9/23/20 11:41





 


 Lorazepam


  (Ativan 2mg/ml


 1ml)  2 mg  Q4H  PRN


 IV


 For Anxiety  10/5/20 00:45


 10/12/20 00:44  10/5/20 03:06





 


 Magnesium Sulfate  100 ml @ 


 100 mls/hr  Q1H


 IVPB


   10/7/20 09:30


 10/7/20 13:29  10/7/20 12:18





 


 Pantoprazole


  (Protonix)  40 mg  EVERY 12  HOURS


 IVP


   9/28/20 21:00


 10/28/20 20:59  10/7/20 08:27





 


 Potassium Chloride


  (K-Dur)  40 meq  EVERY 12  HOURS


 GT


   9/26/20 21:00


 12/19/20 12:14  10/7/20 08:27





 


 Sodium Chloride  1,000 ml @ 


 50 mls/hr  Q20H


 IV


   10/4/20 11:00


 11/3/20 10:59  10/6/20 22:31











Allergies:  


Coded Allergies:  


     No Known Allergies (Unverified , 1/8/19)


ROS Limited/Unobtainable:  Yes


Subjective


60 YO F with Down's syndrome admitted with hypoxia.  Now sepsis and pneumonia.  

Cover for Int Med-DR Hung.  ICU.  Intubated and sedated





Objective





Last Vital Signs








  Date Time  Temp Pulse Resp B/P (MAP) Pulse Ox O2 Delivery O2 Flow Rate FiO2


 


10/7/20 11:00  62 28 115/56 (75) 95   


 


10/7/20 08:20        50


 


10/7/20 08:00 98.3       


 


10/7/20 08:00      Mechanical Ventilator  





      Mechanical Ventilator  





      Mechanical Ventilator  











Laboratory Tests








Test


 10/7/20


00:28 10/7/20


04:00 10/7/20


04:13 10/7/20


08:06


 


POC Whole Blood Glucose


 121 MG/DL


()  H 


 146 MG/DL


()  H 





 


White Blood Count


 


 7.5 K/UL


(4.8-10.8) 


 





 


Red Blood Count


 


 2.35 M/UL


(4.20-5.40)  L 


 





 


Hemoglobin


 


 7.5 G/DL


(12.0-16.0)  L 


 





 


Hematocrit


 


 22.3 %


(37.0-47.0)  L 


 





 


Mean Corpuscular Volume  95 FL (80-99)    


 


Mean Corpuscular Hemoglobin


 


 32.0 PG


(27.0-31.0)  H 


 





 


Mean Corpuscular Hemoglobin


Concent 


 33.7 G/DL


(32.0-36.0) 


 





 


Red Cell Distribution Width


 


 18.0 %


(11.6-14.8)  H 


 





 


Platelet Count


 


 74 K/UL


(150-450)  L 


 





 


Mean Platelet Volume


 


 7.7 FL


(6.5-10.1) 


 





 


Neutrophils (%) (Auto)


 


 % (45.0-75.0)


 


 





 


Lymphocytes (%) (Auto)


 


 % (20.0-45.0)


 


 





 


Monocytes (%) (Auto)   % (1.0-10.0)    


 


Eosinophils (%) (Auto)   % (0.0-3.0)    


 


Basophils (%) (Auto)   % (0.0-2.0)    


 


Sodium Level


 


 145 MMOL/L


(136-145) 


 





 


Potassium Level


 


 3.7 MMOL/L


(3.5-5.1) 


 





 


Chloride Level


 


 111 MMOL/L


()  H 


 





 


Carbon Dioxide Level


 


 26 MMOL/L


(21-32) 


 





 


Anion Gap


 


 8 mmol/L


(5-15) 


 





 


Blood Urea Nitrogen


 


 16 mg/dL


(7-18) 


 





 


Creatinine


 


 0.6 MG/DL


(0.55-1.30) 


 





 


Estimat Glomerular Filtration


Rate 


 > 60 mL/min


(>60) 


 





 


Glucose Level


 


 156 MG/DL


()  H 


 





 


Calcium Level


 


 6.9 MG/DL


(8.5-10.1)  L 


 





 


Phosphorus Level


 


 2.5 MG/DL


(2.5-4.9) 


 





 


Magnesium Level


 


 1.6 MG/DL


(1.8-2.4)  L 


 





 


Total Bilirubin


 


 0.6 MG/DL


(0.2-1.0) 


 





 


Aspartate Amino Transf


(AST/SGOT) 


 18 U/L (15-37)


 


 





 


Alanine Aminotransferase


(ALT/SGPT) 


 19 U/L (12-78)


 


 





 


Alkaline Phosphatase


 


 51 U/L


() 


 





 


Total Protein


 


 4.4 G/DL


(6.4-8.2)  L 


 





 


Albumin


 


 1.1 G/DL


(3.4-5.0)  L 


 





 


Globulin  3.3 g/dL    


 


Albumin/Globulin Ratio


 


 0.3 (1.0-2.7)


L 


 





 


Arterial Blood pH


 


 


 


 7.524


(7.350-7.450)


 


Arterial Blood Partial


Pressure CO2 


 


 


 29.1 mmHg


(35.0-45.0)  L


 


Arterial Blood Partial


Pressure O2 


 


 


 100.2 mmHg


(75.0-100.0)  H


 


Arterial Blood HCO3


 


 


 


 23.4 mmol/L


(22.0-26.0)


 


Arterial Blood Oxygen


Saturation 


 


 


 97.6 %


()


 


Arterial Blood Base Excess    0.9 (-2-2)  


 


Cole Test    Positive  

















Intake and Output  


 


 10/6/20 10/7/20





 19:00 07:00


 


Intake Total 1427.776 ml 1100 ml


 


Output Total 820 ml 500 ml


 


Balance 607.776 ml 600 ml


 


  


 


Free Water 40 ml 50 ml


 


IV Total 787.776 ml 600 ml


 


Tube Feeding 600 ml 450 ml


 


Output Urine Total 720 ml 500 ml


 


Chest Tube Drainage Total 100 ml 


 


# Bowel Movements 2 3








Objective


General Appearance:  WD/WN, no apparent distress, alert


EENT:  PERRL/EOMI, normal ENT inspection


Neck:  non-tender, normal alignment, supple, normal inspection


Cardiovascular:  normal peripheral pulses, normal rate, regular rhythm, no gall

op/murmur, no JVD


Respiratory/Chest:  Mech vent; decreased breath sounds, crackles/rales, rhonchi 

- bilaterally, expiratory wheezing


Abdomen:  normal bowel sounds, non tender, soft, no organomegaly, no mass


Extremities:  normal range of motion


Neurologic:  CNs II-XII grossly normal


Skin:  normal pigmentation, warm/dry





Assessment/Plan


Problem List:  


(1) HCAP (healthcare-associated pneumonia)


Assessment & Plan:  Strep Group G.  S/P amikacin per ID=Dr Gomes.  

Pulmonary/Critical care=DR Cherry.  COVID NEG





(2) Sepsis


Assessment & Plan:  Staph haemolyticus.  S/P amikacin per ID=Dr Gomes





(3) Down's syndrome


(4) Dysphagia


Assessment & Plan:  S/P PEG





(5) Seizure disorder


Assessment & Plan:  Continue keppra and depakote





(6) Hypothyroidism


Assessment & Plan:  Continue synthroid





(7) Acute respiratory failure


Assessment & Plan:  Pulmonary = Dr Cherry; mech vent





(8) Pneumothorax, right


Assessment & Plan:  Continue chest tube per pulmonary





(9) Cardiac arrest


Assessment & Plan:  8/26/20-see cardiology note=Sanya Dunn MD                Oct 7, 2020 12:25

## 2020-10-07 NOTE — NUR
NURSE NOTES:

Received patient from Aster TRAYLOR. patient in bed, Sinus arnold at this time HR 46. Orally 
intubated with ordered vent settings noted at this time. Left Thora Vent connected to LIS 
draining Serous fluid. CARLOS picc line running 1/2 NS @50. Left lower chest dressing dry and 
intact. GT feeding Vital AF @50 with no residual noted at this time. Valle cath draining by 
gravity. Bed in lowest positions at this time. Patient is to be NPO at midnight for Trach 
tomorrow. No signs of distress  noted will continue to monitor.

## 2020-10-07 NOTE — NUR
NURSE NOTES:

Xray at bedside

-------------------------------------------------------------------------------

Addendum: 10/07/20 at 0802 by Aster Nguyen RN RN

-------------------------------------------------------------------------------

NURSE NOTES:

*correctection: RT at bedside.

## 2020-10-07 NOTE — NUR
Report received from LAYTON Faulkner. Pt is asleep in bed. Vitals stable at HR 66, 99% O2, 24RR, 
95/54. Pt has a current out put of 15cc from chest tube. soft restraints still in place. Pt 
has noted redness on sacral region, perineum. pt has left toe DTI. PICC line patent and 
intact, current fluids running 1/2 NS @ 50mls/hr.

## 2020-10-07 NOTE — NUR
NURSE HAND-OFF REPORT: 



Latest Vital Signs: Temperature 98.3 , Pulse 55 , B/P 135 /60 , Respiratory Rate 24 , O2 SAT 
99 , Mechanical Ventilator, O2 Flow Rate 15.0 .  

Vital Sign Comment: 



EKG Rhythm: Sinus Rhythm

Rhythm change?: N 

MD Notified?: N- MD aware of SB

MD Response: 



Latest Momin Fall Score: 70  

Fall Risk: High Risk 

Safety Measures: Call light Within Reach, Bed Alarm Zone 1, Side Rails Side Rails x3, Bed 
position Low and Locked.

Fall Precautions: 

Yellow Socks



Report given to Noble BLANTON RN 

Informed RN patient is to be NPO at Midnight for trach placement tomorrow

## 2020-10-07 NOTE — CARDIOLOGY PROGRESS NOTE
Assessment/Plan


Assessment/Plan


sepsis


respiratory failure 


ards 


renal insuf 


bacteremia


abn cardiac enzyme due to demand 


sinus arnold stable 


thrombocytopenia resolved  


hypernatremia 


pneumothorax now s/p chest tube on the left now 











vent support 


abx 


 


tsh seems fine 


remains off pressor still at this time 


hr inthe 60's s no sig pauses on tele 


tele personally reviewed 


left thoravent


trach plans 10/8


bp better 


still sig secretions 


no on 60% 


 








Subjective


ROS Limited/Unobtainable:  Yes


Subjective


on  a vent not communicative not  AWAKE





Objective





Last 24 Hour Vital Signs








  Date Time  Temp Pulse Resp B/P (MAP) Pulse Ox O2 Delivery O2 Flow Rate FiO2


 


10/7/20 19:24  52 24     50


 


10/7/20 19:24        65


 


10/7/20 19:00  52 24 108/45 (66) 95   


 


10/7/20 18:00   24 108/45 (66) 91   


 


10/7/20 17:00  63 27 117/53 (74) 95   


 


10/7/20 16:00 98.5 61 22 116/53 (74) 92   


 


10/7/20 16:00        50


 


10/7/20 16:00  61      


 


10/7/20 16:00      Mechanical Ventilator  





      Mechanical Ventilator  





      Mechanical Ventilator  


 


10/7/20 15:00  56 28 94/46 (62) 94   


 


10/7/20 14:59  66 24     50


 


10/7/20 14:00  57 32 105/47 (66) 95   


 


10/7/20 13:00  55 25 104/45 (64) 95   


 


10/7/20 12:00        50


 


10/7/20 12:00  68      


 


10/7/20 12:00  68 26 113/59 (77) 93   


 


10/7/20 12:00      Mechanical Ventilator  





      Mechanical Ventilator  





      Mechanical Ventilator  


 


10/7/20 11:08  61 24     50


 


10/7/20 11:00  62 28 115/56 (75) 95   


 


10/7/20 10:00  65 24 123/53 (76) 94   


 


10/7/20 10:00  65 27 123/53 (76) 95   


 


10/7/20 09:00  66 22 110/49 (69) 94   


 


10/7/20 08:20        50


 


10/7/20 08:00 98.3 68 21 99/47 (64) 96   


 


10/7/20 08:00        50


 


10/7/20 08:00  68      


 


10/7/20 08:00      Mechanical Ventilator  





      Mechanical Ventilator  





      Mechanical Ventilator  


 


10/7/20 07:30  68 24     55


 


10/7/20 07:00  66 21 95/54 (68) 92   


 


10/7/20 06:00  82 24 126/53 (77) 94   


 


10/7/20 05:00  59 24 110/54 (72) 95   


 


10/7/20 04:00 98.2 59 23 99/44 (62) 96   


 


10/7/20 04:00        55


 


10/7/20 04:00      Mechanical Ventilator  





      Mechanical Ventilator  





      Mechanical Ventilator  


 


10/7/20 04:00  61      


 


10/7/20 03:20  59 24     55


 


10/7/20 03:00  60 24 104/36 (58) 96   


 


10/7/20 02:00  70 21 127/54 (78) 96   


 


10/7/20 01:00  61 24 119/58 (78) 96   


 


10/7/20 00:00 98.4 62 25 113/52 (72) 95   


 


10/7/20 00:00        55


 


10/7/20 00:00      Mechanical Ventilator  





      Mechanical Ventilator  





      Mechanical Ventilator  


 


10/7/20 00:00  63      


 


10/6/20 23:53  61 24     55


 


10/6/20 23:00  65 23 103/48 (66) 97   


 


10/6/20 22:00  68 24 105/52 (69) 94   


 


10/6/20 21:10  64 24     55


 


10/6/20 21:00  67 25 89/43 (58) 96   


 


10/6/20 20:00  65      


 


10/6/20 20:00        55


 


10/6/20 20:00      Mechanical Ventilator  





      Mechanical Ventilator  





      Mechanical Ventilator  


 


10/6/20 20:00 98.0 75 27 109/79 (89) 93   








General Appearance:  no apparent distress, alert, on vent, patient on isolation,

isolation precautions


Neck:  supple


Cardiovascular:  normal rate


Respiratory/Chest:  rhonchi - bilaterally


Abdomen:  normal bowel sounds, non tender, soft


Extremities:  no swelling











Intake and Output  


 


 10/6/20 10/7/20





 19:00 07:00


 


Intake Total 1427.776 ml 1100 ml


 


Output Total 820 ml 500 ml


 


Balance 607.776 ml 600 ml


 


  


 


Free Water 40 ml 50 ml


 


IV Total 787.776 ml 600 ml


 


Tube Feeding 600 ml 450 ml


 


Output Urine Total 720 ml 500 ml


 


Chest Tube Drainage Total 100 ml 


 


# Bowel Movements 2 3











Laboratory Tests








Test


 10/7/20


00:28 10/7/20


04:00 10/7/20


04:13 10/7/20


08:06


 


POC Whole Blood Glucose


 121 MG/DL


()  H 


 146 MG/DL


()  H 





 


White Blood Count


 


 7.5 K/UL


(4.8-10.8) 


 





 


Red Blood Count


 


 2.35 M/UL


(4.20-5.40)  L 


 





 


Hemoglobin


 


 7.5 G/DL


(12.0-16.0)  L 


 





 


Hematocrit


 


 22.3 %


(37.0-47.0)  L 


 





 


Mean Corpuscular Volume  95 FL (80-99)    


 


Mean Corpuscular Hemoglobin


 


 32.0 PG


(27.0-31.0)  H 


 





 


Mean Corpuscular Hemoglobin


Concent 


 33.7 G/DL


(32.0-36.0) 


 





 


Red Cell Distribution Width


 


 18.0 %


(11.6-14.8)  H 


 





 


Platelet Count


 


 74 K/UL


(150-450)  L 


 





 


Mean Platelet Volume


 


 7.7 FL


(6.5-10.1) 


 





 


Neutrophils (%) (Auto)


 


 % (45.0-75.0)


 


 





 


Lymphocytes (%) (Auto)


 


 % (20.0-45.0)


 


 





 


Monocytes (%) (Auto)   % (1.0-10.0)    


 


Eosinophils (%) (Auto)   % (0.0-3.0)    


 


Basophils (%) (Auto)   % (0.0-2.0)    


 


Sodium Level


 


 145 MMOL/L


(136-145) 


 





 


Potassium Level


 


 3.7 MMOL/L


(3.5-5.1) 


 





 


Chloride Level


 


 111 MMOL/L


()  H 


 





 


Carbon Dioxide Level


 


 26 MMOL/L


(21-32) 


 





 


Anion Gap


 


 8 mmol/L


(5-15) 


 





 


Blood Urea Nitrogen


 


 16 mg/dL


(7-18) 


 





 


Creatinine


 


 0.6 MG/DL


(0.55-1.30) 


 





 


Estimat Glomerular Filtration


Rate 


 > 60 mL/min


(>60) 


 





 


Glucose Level


 


 156 MG/DL


()  H 


 





 


Calcium Level


 


 6.9 MG/DL


(8.5-10.1)  L 


 





 


Phosphorus Level


 


 2.5 MG/DL


(2.5-4.9) 


 





 


Magnesium Level


 


 1.6 MG/DL


(1.8-2.4)  L 


 





 


Total Bilirubin


 


 0.6 MG/DL


(0.2-1.0) 


 





 


Aspartate Amino Transf


(AST/SGOT) 


 18 U/L (15-37)


 


 





 


Alanine Aminotransferase


(ALT/SGPT) 


 19 U/L (12-78)


 


 





 


Alkaline Phosphatase


 


 51 U/L


() 


 





 


Total Protein


 


 4.4 G/DL


(6.4-8.2)  L 


 





 


Albumin


 


 1.1 G/DL


(3.4-5.0)  L 


 





 


Globulin  3.3 g/dL    


 


Albumin/Globulin Ratio


 


 0.3 (1.0-2.7)


L 


 





 


Arterial Blood pH


 


 


 


 7.524


(7.350-7.450)


 


Arterial Blood Partial


Pressure CO2 


 


 


 29.1 mmHg


(35.0-45.0)  L


 


Arterial Blood Partial


Pressure O2 


 


 


 100.2 mmHg


(75.0-100.0)  H


 


Arterial Blood HCO3


 


 


 


 23.4 mmol/L


(22.0-26.0)


 


Arterial Blood Oxygen


Saturation 


 


 


 97.6 %


()


 


Arterial Blood Base Excess    0.9 (-2-2)  


 


Cole Test    Positive  











Microbiology








 Date/Time


Source Procedure


Growth Status





 


 10/5/20 13:30


Urine,Clean Catch Urine Culture - Preliminary


Gram Negative Bacillus 1 Resulted





 10/5/20 13:30


Sputum Gram Stain - Final Resulted


 


 10/5/20 13:30 Sputum Culture - Preliminary


Gram Negative Bacillus 1


Usual Respiratory Estrella Resulted

















Constantine Jorgensen MD           Oct 7, 2020 19:46

## 2020-10-07 NOTE — NUR
SI;    RESP FAILURE ETT/VENT SUPPORT

T. 98.3 HR 68 RR 28 B/P 123/83

AC 24  FIO2 50%

H/H 7.5/22.3 CA 6.9 MG 1.6







IS:      IVF NS 2 50ML/HR

SOLU CORTEF IV

K-PHOS IV

MAGNESIUM IV

********ICU STATUS*******

## 2020-10-07 NOTE — GENERAL PROGRESS NOTE
Subjective


ROS Limited/Unobtainable:  No


Allergies:  


Coded Allergies:  


     No Known Allergies (Unverified , 1/8/19)





Objective





Last 24 Hour Vital Signs








  Date Time  Temp Pulse Resp B/P (MAP) Pulse Ox O2 Delivery O2 Flow Rate FiO2


 


10/7/20 09:00  66 22 110/49 (69) 94   


 


10/7/20 08:20        50


 


10/7/20 08:00 98.3 68 21 99/47 (64) 96   


 


10/7/20 08:00        30


 


10/7/20 08:00  68      


 


10/7/20 08:00      Mechanical Ventilator  





      Mechanical Ventilator  





      Mechanical Ventilator  


 


10/7/20 07:30  68 24     55


 


10/7/20 07:00  66 21 95/54 (68) 92   


 


10/7/20 06:00  82 24 126/53 (77) 94   


 


10/7/20 05:00  59 24 110/54 (72) 95   


 


10/7/20 04:00 98.2 59 23 99/44 (62) 96   


 


10/7/20 04:00        55


 


10/7/20 04:00      Mechanical Ventilator  





      Mechanical Ventilator  





      Mechanical Ventilator  


 


10/7/20 04:00  61      


 


10/7/20 03:20  59 24     55


 


10/7/20 03:00  60 24 104/36 (58) 96   


 


10/7/20 02:00  70 21 127/54 (78) 96   


 


10/7/20 01:00  61 24 119/58 (78) 96   


 


10/7/20 00:00 98.4 62 25 113/52 (72) 95   


 


10/7/20 00:00        55


 


10/7/20 00:00      Mechanical Ventilator  





      Mechanical Ventilator  





      Mechanical Ventilator  


 


10/7/20 00:00  63      


 


10/6/20 23:53  61 24     55


 


10/6/20 23:00  65 23 103/48 (66) 97   


 


10/6/20 22:00  68 24 105/52 (69) 94   


 


10/6/20 21:10  64 24     55


 


10/6/20 21:00  67 25 89/43 (58) 96   


 


10/6/20 20:00  65      


 


10/6/20 20:00        55


 


10/6/20 20:00      Mechanical Ventilator  





      Mechanical Ventilator  





      Mechanical Ventilator  


 


10/6/20 20:00 98.0 75 27 109/79 (89) 93   


 


10/6/20 19:00  61 24 98/57 (71) 95   


 


10/6/20 18:00  58 25 96/45 (62) 96   


 


10/6/20 17:00  59 22 99/44 (62) 94   


 


10/6/20 16:00        55


 


10/6/20 16:00  60      


 


10/6/20 16:00      Mechanical Ventilator  





      Mechanical Ventilator  





      Mechanical Ventilator  


 


10/6/20 16:00 98.2 62 25 101/44 (63) 97   


 


10/6/20 15:25  67 24     55


 


10/6/20 15:00  60 24 101/44 (63) 97   


 


10/6/20 14:00  61 24 120/63 (82) 97   


 


10/6/20 13:00  63 23 117/57 (77) 98   


 


10/6/20 12:00        65


 


10/6/20 12:00  64      


 


10/6/20 12:00 98.5 65 20 119/50 (73) 96   


 


10/6/20 12:00      Mechanical Ventilator  





      Mechanical Ventilator  





      Mechanical Ventilator  


 


10/6/20 11:32  67 24     65


 


10/6/20 11:00  64 22 114/48 (70) 98   


 


10/6/20 11:00  62 24     65

















Intake and Output  


 


 10/6/20 10/7/20





 19:00 07:00


 


Intake Total 1427.776 ml 1100 ml


 


Output Total 820 ml 500 ml


 


Balance 607.776 ml 600 ml


 


  


 


Free Water 40 ml 50 ml


 


IV Total 787.776 ml 600 ml


 


Tube Feeding 600 ml 450 ml


 


Output Urine Total 720 ml 500 ml


 


Chest Tube Drainage Total 100 ml 


 


# Bowel Movements 2 3








Laboratory Tests


10/6/20 12:13: POC Whole Blood Glucose 128H


10/7/20 00:28: POC Whole Blood Glucose 121H


10/7/20 04:00: 


White Blood Count 7.5, Red Blood Count 2.35L, Hemoglobin 7.5L, Hematocrit 22.3L,

Mean Corpuscular Volume 95, Mean Corpuscular Hemoglobin 32.0H, Mean Corpuscular 

Hemoglobin Concent 33.7, Red Cell Distribution Width 18.0H, Platelet Count 74L, 

Mean Platelet Volume 7.7, Neutrophils (%) (Auto) , Lymphocytes (%) (Auto) , 

Monocytes (%) (Auto) , Eosinophils (%) (Auto) , Basophils (%) (Auto) , Sodium 

Level 145, Potassium Level 3.7, Chloride Level 111H, Carbon Dioxide Level 26, 

Anion Gap 8, Blood Urea Nitrogen 16, Creatinine 0.6, Estimat Glomerular 

Filtration Rate > 60, Glucose Level 156H, Calcium Level 6.9L, Phosphorus Level 

2.5, Magnesium Level 1.6L, Total Bilirubin 0.6, Aspartate Amino Transf 

(AST/SGOT) 18, Alanine Aminotransferase (ALT/SGPT) 19, Alkaline Phosphatase 51, 

Total Protein 4.4L, Albumin 1.1L, Globulin 3.3, Albumin/Globulin Ratio 0.3L


10/7/20 04:13: POC Whole Blood Glucose 146H


10/7/20 08:06: 


Arterial Blood pH 7.524H, Arterial Blood Partial Pressure CO2 29.1L, Arterial 

Blood Partial Pressure O2 100.2H, Arterial Blood HCO3 23.4, Arterial Blood 

Oxygen Saturation 97.6, Arterial Blood Base Excess 0.9, Cole Test Positive


Height (Feet):  5


Height (Inches):  3.00


Weight (Pounds):  172


General Appearance:  no apparent distress


EENT:  normal ENT inspection


Neck:  supple


Cardiovascular:  normal rate


Respiratory/Chest:  decreased breath sounds


Abdomen:  normal bowel sounds, non tender, soft


Extremities:  non-tender





Assessment/Plan


Status:  stable, not improved, unchanged


Assessment/Plan:


1. History of Down syndrome.


2. Dysphagia with G-tube.


3. Seizure disorder.


4. Hypothyroidism.


5. DAVID.


6. Pneumonia.


7. Sepsis.








fu H&H


prn blood transfusion to keep HGB above 7


ppi


GTF


hold GI procedures for now


pending possible Trach











Ted Nagy MD              Oct 7, 2020 10:24

## 2020-10-07 NOTE — DIAGNOSTIC IMAGING REPORT
Indication: Dyspnea

 

Technique: One view of the chest

 

Comparison: 10/6/2020

 

Findings: Stable satisfactory position of endotracheal tube, left chest vent

catheter, left arm PICC. Bilateral infiltrates are unchanged. There is no

pneumothorax.

 

Impression:  Unchanged, over one day, findings as above.

## 2020-10-07 NOTE — INFECTIOUS DISEASES PROG NOTE
Assessment/Plan





ASSESSMENT:


sp code blue 8/26


Septic Shock; SP


Fever, recurrent- low grade -SP


Leukocytosis; recurrent; SP


     -9/19 Bcx Neg


   -9/9 u/a no pyuria


   -9/8 Bcx Neg(Picc line)


   -9/6 Bcx Neg


            ucx Neg


             sp cx C. parapsilopsis


  -8/26 u/a no pyuria





Pneumonia.- COVID 19 neg x3


   Acute hypoxic resp failure on VM> NRB 15l 100%; hypoxic on ABG> now VDRF 

8/26- Fio2 80% >100% 8/28> 60% 9/1 >80% 9/2   >95% 9/10 >90% 9/14 >60% 9/15


R tension Pneumothroax sp CT 9/21


      10/5 CXR: Extensive bilateral airspace opacities, right greater than left,

 consistent with multifocal infiltrate worse pulmonary edema.  This is  similar 

in appearance to the prior study. Worsening, small left pleural effusion.  This 

may be secondary to  redistribution as the right-sided pleural effusion has 

mildly improved. 


       -9/22 CXR: Interim complete reexpansion of right lung without evidence of

residual pneumothorax. Bilateral diffuse and extensive infiltrates. Markedly 

improved chest wall subcutaneous emphysema


       -9/21 CXR: Interim reexpansion of previously demonstrated right 

pneumothorax, status post large bore chest tube placement.


      -9/14 CXR: Improved aeration of both lungs.


       -9/5 CXR:Small bilateral pleural effusions with minor edema.  Stable 

edema with  mild worsening in the degree of pleural effusion on the right.


         -9/1 sp cx Neg


         8/31 CXR: Extensive bilateral interstitial and airspace disease appears

similar to the prior exam. Moderate to large bilateral pleural effusions appear 

unchanged.


   8/28 CXR: Increasing left upper lobe dense consolidation and likely 

increasing bilateral pleural fluid. Persistent diffuse dense consolidation 

elsewhere


            -8/26 sp cx normal resp lilliam


             -8/25 CXR: Increased atelectasis of the right lung, since prior 

exam of 3 days earlier. New or increased right pleural effusion. Increased left 

basilar consolidation and/or pleural fluid 


          -COVID Rapid PCR neg 8/23, 8/23, 8/26


          -8/22 spc x Group G strep


          -8/22 CXR:   Reduced lung volumes.  Patchy bilateral predominantly 

interstitial  pulmonary opacities.  Could be from edema and/or pneumonia.  There

is a broader differential.


 


        -legionella ag urine, blasto ab, Histo ab, HIV ab screen, JOY, ANCA neg





Persistent, high grade bacteremia-


     -8/22 Bcx 4/4 sets S. haemolyticus; 8/23 Bcx 3/4 S/ epi; 8/27 Bcx 1.4 S. 

warnerri; 8/29 Bcx Neg


     -2d echo: no vegetaions seen





          ua/ wbc 10-15, nit neg, leuk +1; ucx Neg





DAVID; 


 -supratherapeutic vanco levels





-Seizure disorder.


- Hypothyroidism.


- Down syndrome.





History of PEG tube placement.


[facility] resident








PLAN:


Monitor off abx unless febrile, increasing WBC and/or HD unstable





          9/14 SP Meropenem #18, IV Amikacin #7


          9/4/20 SP Daptomycin #11


          9/2 SP MIcafungin #7, Linezolid #5


   8/28 SP Azithromycin #7/7


   8/26 SP Ceftriaxone #2


   8/25 SP IV Vancomycin #4, Zosyn #4


   8/22 SP Cefepime x1, Flagyl x1





- Monitor CBC, BMP.


.f/u cx


- COVID neg x3


- Monitor chest x-ray.











Thank you, Dr. Cherry, for allowing me to participate in the care of


this patient.  I will follow the patient with you at this


hospitalization.








Discussed with RN





Subjective


Allergies:  


Coded Allergies:  


     No Known Allergies (Unverified , 1/8/19)


afebrile


remains intubated; Fio2 50%


no leukocytosis


off abx





Objective





Last 24 Hour Vital Signs








  Date Time  Temp Pulse Resp B/P (MAP) Pulse Ox O2 Delivery O2 Flow Rate FiO2


 


10/7/20 09:00  66 22 110/49 (69) 94   


 


10/7/20 08:20        50


 


10/7/20 08:00 98.3 68 21 99/47 (64) 96   


 


10/7/20 08:00        30


 


10/7/20 08:00      Mechanical Ventilator  





      Mechanical Ventilator  





      Mechanical Ventilator  


 


10/7/20 07:30  68 24     55


 


10/7/20 07:00  66 21 95/54 (68) 92   


 


10/7/20 06:00  82 24 126/53 (77) 94   


 


10/7/20 05:00  59 24 110/54 (72) 95   


 


10/7/20 04:00 98.2 59 23 99/44 (62) 96   


 


10/7/20 04:00        55


 


10/7/20 04:00      Mechanical Ventilator  





      Mechanical Ventilator  





      Mechanical Ventilator  


 


10/7/20 04:00  61      


 


10/7/20 03:20  59 24     55


 


10/7/20 03:00  60 24 104/36 (58) 96   


 


10/7/20 02:00  70 21 127/54 (78) 96   


 


10/7/20 01:00  61 24 119/58 (78) 96   


 


10/7/20 00:00 98.4 62 25 113/52 (72) 95   


 


10/7/20 00:00        55


 


10/7/20 00:00      Mechanical Ventilator  





      Mechanical Ventilator  





      Mechanical Ventilator  


 


10/7/20 00:00  63      


 


10/6/20 23:53  61 24     55


 


10/6/20 23:00  65 23 103/48 (66) 97   


 


10/6/20 22:00  68 24 105/52 (69) 94   


 


10/6/20 21:10  64 24     55


 


10/6/20 21:00  67 25 89/43 (58) 96   


 


10/6/20 20:00  65      


 


10/6/20 20:00        55


 


10/6/20 20:00      Mechanical Ventilator  





      Mechanical Ventilator  





      Mechanical Ventilator  


 


10/6/20 20:00 98.0 75 27 109/79 (89) 93   


 


10/6/20 19:00  61 24 98/57 (71) 95   


 


10/6/20 18:00  58 25 96/45 (62) 96   


 


10/6/20 17:00  59 22 99/44 (62) 94   


 


10/6/20 16:00        55


 


10/6/20 16:00  60      


 


10/6/20 16:00      Mechanical Ventilator  





      Mechanical Ventilator  





      Mechanical Ventilator  


 


10/6/20 16:00 98.2 62 25 101/44 (63) 97   


 


10/6/20 15:25  67 24     55


 


10/6/20 15:00  60 24 101/44 (63) 97   


 


10/6/20 14:00  61 24 120/63 (82) 97   


 


10/6/20 13:00  63 23 117/57 (77) 98   


 


10/6/20 12:00        65


 


10/6/20 12:00  64      


 


10/6/20 12:00 98.5 65 20 119/50 (73) 96   


 


10/6/20 12:00      Mechanical Ventilator  





      Mechanical Ventilator  





      Mechanical Ventilator  


 


10/6/20 11:32  67 24     65


 


10/6/20 11:00  64 22 114/48 (70) 98   


 


10/6/20 11:00  62 24     65


 


10/6/20 10:00  64 27 101/50 (67) 97   








Height (Feet):  5


Height (Inches):  3.00


Weight (Pounds):  172


HEENT:  . ETT in place


CHEST:  Coarse breathing sounds.


HEART:  S1 and S2.


ABDOMEN:  Soft.  PEG tube in place.


SKIN: no rash





Laboratory Tests








Test


 10/6/20


12:13 10/7/20


00:28 10/7/20


04:00 10/7/20


04:13


 


POC Whole Blood Glucose


 128 MG/DL


()  H 121 MG/DL


()  H 


 146 MG/DL


()  H


 


White Blood Count


 


 


 7.5 K/UL


(4.8-10.8) 





 


Red Blood Count


 


 


 2.35 M/UL


(4.20-5.40)  L 





 


Hemoglobin


 


 


 7.5 G/DL


(12.0-16.0)  L 





 


Hematocrit


 


 


 22.3 %


(37.0-47.0)  L 





 


Mean Corpuscular Volume   95 FL (80-99)   


 


Mean Corpuscular Hemoglobin


 


 


 32.0 PG


(27.0-31.0)  H 





 


Mean Corpuscular Hemoglobin


Concent 


 


 33.7 G/DL


(32.0-36.0) 





 


Red Cell Distribution Width


 


 


 18.0 %


(11.6-14.8)  H 





 


Platelet Count


 


 


 74 K/UL


(150-450)  L 





 


Mean Platelet Volume


 


 


 7.7 FL


(6.5-10.1) 





 


Neutrophils (%) (Auto)


 


 


 % (45.0-75.0)


 





 


Lymphocytes (%) (Auto)


 


 


 % (20.0-45.0)


 





 


Monocytes (%) (Auto)    % (1.0-10.0)   


 


Eosinophils (%) (Auto)    % (0.0-3.0)   


 


Basophils (%) (Auto)    % (0.0-2.0)   


 


Sodium Level


 


 


 145 MMOL/L


(136-145) 





 


Potassium Level


 


 


 3.7 MMOL/L


(3.5-5.1) 





 


Chloride Level


 


 


 111 MMOL/L


()  H 





 


Carbon Dioxide Level


 


 


 26 MMOL/L


(21-32) 





 


Anion Gap


 


 


 8 mmol/L


(5-15) 





 


Blood Urea Nitrogen


 


 


 16 mg/dL


(7-18) 





 


Creatinine


 


 


 0.6 MG/DL


(0.55-1.30) 





 


Estimat Glomerular Filtration


Rate 


 


 > 60 mL/min


(>60) 





 


Glucose Level


 


 


 156 MG/DL


()  H 





 


Calcium Level


 


 


 6.9 MG/DL


(8.5-10.1)  L 





 


Phosphorus Level


 


 


 2.5 MG/DL


(2.5-4.9) 





 


Magnesium Level


 


 


 1.6 MG/DL


(1.8-2.4)  L 





 


Total Bilirubin


 


 


 0.6 MG/DL


(0.2-1.0) 





 


Aspartate Amino Transf


(AST/SGOT) 


 


 18 U/L (15-37)


 





 


Alanine Aminotransferase


(ALT/SGPT) 


 


 19 U/L (12-78)


 





 


Alkaline Phosphatase


 


 


 51 U/L


() 





 


Total Protein


 


 


 4.4 G/DL


(6.4-8.2)  L 





 


Albumin


 


 


 1.1 G/DL


(3.4-5.0)  L 





 


Globulin   3.3 g/dL   


 


Albumin/Globulin Ratio


 


 


 0.3 (1.0-2.7)


L 





 


Test


 10/7/20


08:06 


 


 





 


Arterial Blood pH


 7.524


(7.350-7.450) 


 


 





 


Arterial Blood Partial


Pressure CO2 29.1 mmHg


(35.0-45.0)  L 


 


 





 


Arterial Blood Partial


Pressure O2 100.2 mmHg


(75.0-100.0)  H 


 


 





 


Arterial Blood HCO3


 23.4 mmol/L


(22.0-26.0) 


 


 





 


Arterial Blood Oxygen


Saturation 97.6 %


() 


 


 





 


Arterial Blood Base Excess 0.9 (-2-2)     


 


Cole Test Positive     











Current Medications








 Medications


  (Trade)  Dose


 Ordered  Sig/Didi


 Route


 PRN Reason  Start Time


 Stop Time Status Last Admin


Dose Admin


 


 Acetaminophen


  (Tylenol)  650 mg  Q4H  PRN


 GT


 For Pain  9/23/20 17:15


 10/23/20 17:14  10/2/20 17:46





 


 Chlorhexidine


 Gluconate


  (Beatrice-Hex 2%)  1 applic  DAILY@2000


 TOPIC


   8/31/20 20:00


 11/29/20 19:59  10/6/20 20:20





 


 Clotrimazole


  (Lotrimin)  1 applic  Q12HR


 TOPIC


   8/30/20 13:00


 11/28/20 12:59  10/7/20 08:28





 


 Dextrose


  (Dextrose 50%)  25 ml  Q30M  PRN


 IV


 Hypoglycemia  8/26/20 11:30


 11/20/20 11:29   





 


 Dextrose


  (Dextrose 50%)  50 ml  Q30M  PRN


 IV


 Hypoglycemia  8/26/20 11:30


 11/20/20 11:29   





 


 Hydrocortisone


  (Solu-CORTEF)  50 mg  EVERY 12  HOURS


 IV


   10/2/20 21:00


 12/27/20 20:59  10/7/20 08:27





 


 Insulin Aspart


  (NovoLOG)    Q6HR


 SUBQ


   9/18/20 12:00


 12/17/20 11:59  10/7/20 06:10





 


 Insulin Detemir


  (Levemir)  10 units  Q12HR


 SUBQ


   9/18/20 10:00


 12/17/20 09:59  10/7/20 09:07





 


 Levothyroxine


 Sodium


  (Synthroid)  75 mcg  DAILY@0630


 GT


   9/30/20 06:30


 10/30/20 06:29  10/7/20 06:09





 


 Loperamide HCl


  (Imodium)  2 mg  Q6H  PRN


 NG


 Diarrhea  9/16/20 10:30


 10/16/20 10:29  9/23/20 11:41





 


 Lorazepam


  (Ativan 2mg/ml


 1ml)  2 mg  Q4H  PRN


 IV


 For Anxiety  10/5/20 00:45


 10/12/20 00:44  10/5/20 03:06





 


 Magnesium Sulfate  100 ml @ 


 100 mls/hr  Q1H


 IVPB


   10/7/20 09:30


 10/7/20 13:29   





 


 Pantoprazole


  (Protonix)  40 mg  EVERY 12  HOURS


 IVP


   9/28/20 21:00


 10/28/20 20:59  10/7/20 08:27





 


 Potassium Chloride


  (K-Dur)  40 meq  EVERY 12  HOURS


 GT


   9/26/20 21:00


 12/19/20 12:14  10/7/20 08:27





 


 Sodium Chloride  1,000 ml @ 


 50 mls/hr  Q20H


 IV


   10/4/20 11:00


 11/3/20 10:59  10/6/20 22:31




















Rema Gomes M.D.             Oct 7, 2020 10:01

## 2020-10-07 NOTE — PULMONOLGY CRITICAL CARE NOTE
Critical Care - Asmt/Plan


Problems:  


(1) Acute respiratory failure


(2) Pneumothorax


Assessment & Plan:  resolved





(3) Bacteremia


(4) Pneumonia


(5) Sepsis


(6) HCAP (healthcare-associated pneumonia)


(7) Seizure disorder


(8) Down's syndrome


(9) Trisomy 21, Down syndrome


Respiratory:  adjust tidal volume, monitor respiratory rate, adjust FIO2, CXR, 

ABG


Cardiac:  continue to monitor HR/BP


Renal:  F/U I&O, keep IV fluid


Infectious Disease:  check cultures, continue antibiotics


Gastrointestinal:  continue feedings/current rate


Endocrine:  monitor blood sugar


Hematologic:  monitor H/H, transfuse if hgb<8.5


Neurologic:  PRN Ativan, keep patient comfortable


Affect:  PRN ativan


Prophylaxis:  Protonix, Heparin


Disposition:  keep in ICU


Time Spent (Minutes):  40


Notes Reviewed:  internist, cardio, renal, ID


Discussed with:  nurses, consultants, 





Critical Care - Objective





Last 24 Hour Vital Signs








  Date Time  Temp Pulse Resp B/P (MAP) Pulse Ox O2 Delivery O2 Flow Rate FiO2


 


10/7/20 09:00  66 22 110/49 (69) 94   


 


10/7/20 08:20        50


 


10/7/20 08:00 98.3 68 21 99/47 (64) 96   


 


10/7/20 08:00        30


 


10/7/20 08:00  68      


 


10/7/20 08:00      Mechanical Ventilator  





      Mechanical Ventilator  





      Mechanical Ventilator  


 


10/7/20 07:30  68 24     55


 


10/7/20 07:00  66 21 95/54 (68) 92   


 


10/7/20 06:00  82 24 126/53 (77) 94   


 


10/7/20 05:00  59 24 110/54 (72) 95   


 


10/7/20 04:00 98.2 59 23 99/44 (62) 96   


 


10/7/20 04:00        55


 


10/7/20 04:00      Mechanical Ventilator  





      Mechanical Ventilator  





      Mechanical Ventilator  


 


10/7/20 04:00  61      


 


10/7/20 03:20  59 24     55


 


10/7/20 03:00  60 24 104/36 (58) 96   


 


10/7/20 02:00  70 21 127/54 (78) 96   


 


10/7/20 01:00  61 24 119/58 (78) 96   


 


10/7/20 00:00 98.4 62 25 113/52 (72) 95   


 


10/7/20 00:00        55


 


10/7/20 00:00      Mechanical Ventilator  





      Mechanical Ventilator  





      Mechanical Ventilator  


 


10/7/20 00:00  63      


 


10/6/20 23:53  61 24     55


 


10/6/20 23:00  65 23 103/48 (66) 97   


 


10/6/20 22:00  68 24 105/52 (69) 94   


 


10/6/20 21:10  64 24     55


 


10/6/20 21:00  67 25 89/43 (58) 96   


 


10/6/20 20:00  65      


 


10/6/20 20:00        55


 


10/6/20 20:00      Mechanical Ventilator  





      Mechanical Ventilator  





      Mechanical Ventilator  


 


10/6/20 20:00 98.0 75 27 109/79 (89) 93   


 


10/6/20 19:00  61 24 98/57 (71) 95   


 


10/6/20 18:00  58 25 96/45 (62) 96   


 


10/6/20 17:00  59 22 99/44 (62) 94   


 


10/6/20 16:00        55


 


10/6/20 16:00  60      


 


10/6/20 16:00      Mechanical Ventilator  





      Mechanical Ventilator  





      Mechanical Ventilator  


 


10/6/20 16:00 98.2 62 25 101/44 (63) 97   


 


10/6/20 15:25  67 24     55


 


10/6/20 15:00  60 24 101/44 (63) 97   


 


10/6/20 14:00  61 24 120/63 (82) 97   


 


10/6/20 13:00  63 23 117/57 (77) 98   


 


10/6/20 12:00        65


 


10/6/20 12:00  64      


 


10/6/20 12:00 98.5 65 20 119/50 (73) 96   


 


10/6/20 12:00      Mechanical Ventilator  





      Mechanical Ventilator  





      Mechanical Ventilator  


 


10/6/20 11:32  67 24     65


 


10/6/20 11:00  64 22 114/48 (70) 98   


 


10/6/20 11:00  62 24     65








Status:  sedated


Condition:  critical


HEENT:  atraumatic


Lungs:  rales, rhonchi


Heart:  HR/BP stable


Abdomen:  soft, non-tender


Extremities:  no C/C/E


Accucheck:  147





Critical Care - Subjective


ROS Limited/Unobtainable:  Yes


Condition:  critical


EKG Rhythm:  Sinus Rhythm


FI02:  50


Vent Support Breath Rate:  24


Vent Support Mode:  AC


Vent Tidal Volume:  500


Sputum Amount:  Moderate


PEEP:  0.0


PIP:  43


Tube Feeding Amount:  50


I&O:











Intake and Output  


 


 10/6/20 10/7/20





 19:00 07:00


 


Intake Total 1427.776 ml 1100 ml


 


Output Total 820 ml 500 ml


 


Balance 607.776 ml 600 ml


 


  


 


Free Water 40 ml 50 ml


 


IV Total 787.776 ml 600 ml


 


Tube Feeding 600 ml 450 ml


 


Output Urine Total 720 ml 500 ml


 


Chest Tube Drainage Total 100 ml 


 


# Bowel Movements 2 3








CXR:


 Bilateral extensive infiltrates are unchanged.


ET-Tube:  7.5


ET Position:  23


Labs:





Laboratory Tests








Test


 10/6/20


12:13 10/7/20


00:28 10/7/20


04:00 10/7/20


04:13


 


POC Whole Blood Glucose


 128 MG/DL


()  H 121 MG/DL


()  H 


 146 MG/DL


()  H


 


White Blood Count


 


 


 7.5 K/UL


(4.8-10.8) 





 


Red Blood Count


 


 


 2.35 M/UL


(4.20-5.40)  L 





 


Hemoglobin


 


 


 7.5 G/DL


(12.0-16.0)  L 





 


Hematocrit


 


 


 22.3 %


(37.0-47.0)  L 





 


Mean Corpuscular Volume   95 FL (80-99)   


 


Mean Corpuscular Hemoglobin


 


 


 32.0 PG


(27.0-31.0)  H 





 


Mean Corpuscular Hemoglobin


Concent 


 


 33.7 G/DL


(32.0-36.0) 





 


Red Cell Distribution Width


 


 


 18.0 %


(11.6-14.8)  H 





 


Platelet Count


 


 


 74 K/UL


(150-450)  L 





 


Mean Platelet Volume


 


 


 7.7 FL


(6.5-10.1) 





 


Neutrophils (%) (Auto)


 


 


 % (45.0-75.0)


 





 


Lymphocytes (%) (Auto)


 


 


 % (20.0-45.0)


 





 


Monocytes (%) (Auto)    % (1.0-10.0)   


 


Eosinophils (%) (Auto)    % (0.0-3.0)   


 


Basophils (%) (Auto)    % (0.0-2.0)   


 


Sodium Level


 


 


 145 MMOL/L


(136-145) 





 


Potassium Level


 


 


 3.7 MMOL/L


(3.5-5.1) 





 


Chloride Level


 


 


 111 MMOL/L


()  H 





 


Carbon Dioxide Level


 


 


 26 MMOL/L


(21-32) 





 


Anion Gap


 


 


 8 mmol/L


(5-15) 





 


Blood Urea Nitrogen


 


 


 16 mg/dL


(7-18) 





 


Creatinine


 


 


 0.6 MG/DL


(0.55-1.30) 





 


Estimat Glomerular Filtration


Rate 


 


 > 60 mL/min


(>60) 





 


Glucose Level


 


 


 156 MG/DL


()  H 





 


Calcium Level


 


 


 6.9 MG/DL


(8.5-10.1)  L 





 


Phosphorus Level


 


 


 2.5 MG/DL


(2.5-4.9) 





 


Magnesium Level


 


 


 1.6 MG/DL


(1.8-2.4)  L 





 


Total Bilirubin


 


 


 0.6 MG/DL


(0.2-1.0) 





 


Aspartate Amino Transf


(AST/SGOT) 


 


 18 U/L (15-37)


 





 


Alanine Aminotransferase


(ALT/SGPT) 


 


 19 U/L (12-78)


 





 


Alkaline Phosphatase


 


 


 51 U/L


() 





 


Total Protein


 


 


 4.4 G/DL


(6.4-8.2)  L 





 


Albumin


 


 


 1.1 G/DL


(3.4-5.0)  L 





 


Globulin   3.3 g/dL   


 


Albumin/Globulin Ratio


 


 


 0.3 (1.0-2.7)


L 





 


Test


 10/7/20


08:06 


 


 





 


Arterial Blood pH


 7.524


(7.350-7.450) 


 


 





 


Arterial Blood Partial


Pressure CO2 29.1 mmHg


(35.0-45.0)  L 


 


 





 


Arterial Blood Partial


Pressure O2 100.2 mmHg


(75.0-100.0)  H 


 


 





 


Arterial Blood HCO3


 23.4 mmol/L


(22.0-26.0) 


 


 





 


Arterial Blood Oxygen


Saturation 97.6 %


() 


 


 





 


Arterial Blood Base Excess 0.9 (-2-2)     


 


Cole Test Positive     

















Chano Cherry MD               Oct 7, 2020 10:41

## 2020-10-08 VITALS — DIASTOLIC BLOOD PRESSURE: 76 MMHG | SYSTOLIC BLOOD PRESSURE: 119 MMHG

## 2020-10-08 VITALS — SYSTOLIC BLOOD PRESSURE: 97 MMHG | DIASTOLIC BLOOD PRESSURE: 64 MMHG

## 2020-10-08 VITALS — DIASTOLIC BLOOD PRESSURE: 54 MMHG | SYSTOLIC BLOOD PRESSURE: 125 MMHG

## 2020-10-08 VITALS — DIASTOLIC BLOOD PRESSURE: 61 MMHG | SYSTOLIC BLOOD PRESSURE: 105 MMHG

## 2020-10-08 VITALS — DIASTOLIC BLOOD PRESSURE: 66 MMHG | SYSTOLIC BLOOD PRESSURE: 126 MMHG

## 2020-10-08 VITALS — DIASTOLIC BLOOD PRESSURE: 43 MMHG | SYSTOLIC BLOOD PRESSURE: 84 MMHG

## 2020-10-08 VITALS — DIASTOLIC BLOOD PRESSURE: 54 MMHG | SYSTOLIC BLOOD PRESSURE: 106 MMHG

## 2020-10-08 VITALS — SYSTOLIC BLOOD PRESSURE: 126 MMHG | DIASTOLIC BLOOD PRESSURE: 66 MMHG

## 2020-10-08 VITALS — DIASTOLIC BLOOD PRESSURE: 41 MMHG | SYSTOLIC BLOOD PRESSURE: 91 MMHG

## 2020-10-08 VITALS — DIASTOLIC BLOOD PRESSURE: 55 MMHG | SYSTOLIC BLOOD PRESSURE: 136 MMHG

## 2020-10-08 VITALS — DIASTOLIC BLOOD PRESSURE: 47 MMHG | SYSTOLIC BLOOD PRESSURE: 116 MMHG

## 2020-10-08 VITALS — SYSTOLIC BLOOD PRESSURE: 128 MMHG | DIASTOLIC BLOOD PRESSURE: 66 MMHG

## 2020-10-08 VITALS — DIASTOLIC BLOOD PRESSURE: 57 MMHG | SYSTOLIC BLOOD PRESSURE: 104 MMHG

## 2020-10-08 VITALS — SYSTOLIC BLOOD PRESSURE: 130 MMHG | DIASTOLIC BLOOD PRESSURE: 57 MMHG

## 2020-10-08 VITALS — DIASTOLIC BLOOD PRESSURE: 55 MMHG | SYSTOLIC BLOOD PRESSURE: 97 MMHG

## 2020-10-08 VITALS — DIASTOLIC BLOOD PRESSURE: 53 MMHG | SYSTOLIC BLOOD PRESSURE: 127 MMHG

## 2020-10-08 VITALS — DIASTOLIC BLOOD PRESSURE: 53 MMHG | SYSTOLIC BLOOD PRESSURE: 94 MMHG

## 2020-10-08 VITALS — SYSTOLIC BLOOD PRESSURE: 83 MMHG | DIASTOLIC BLOOD PRESSURE: 40 MMHG

## 2020-10-08 VITALS — DIASTOLIC BLOOD PRESSURE: 62 MMHG | SYSTOLIC BLOOD PRESSURE: 139 MMHG

## 2020-10-08 VITALS — DIASTOLIC BLOOD PRESSURE: 54 MMHG | SYSTOLIC BLOOD PRESSURE: 92 MMHG

## 2020-10-08 VITALS — DIASTOLIC BLOOD PRESSURE: 55 MMHG | SYSTOLIC BLOOD PRESSURE: 125 MMHG

## 2020-10-08 VITALS — SYSTOLIC BLOOD PRESSURE: 96 MMHG | DIASTOLIC BLOOD PRESSURE: 72 MMHG

## 2020-10-08 VITALS — SYSTOLIC BLOOD PRESSURE: 133 MMHG | DIASTOLIC BLOOD PRESSURE: 55 MMHG

## 2020-10-08 VITALS — SYSTOLIC BLOOD PRESSURE: 98 MMHG | DIASTOLIC BLOOD PRESSURE: 53 MMHG

## 2020-10-08 VITALS — DIASTOLIC BLOOD PRESSURE: 53 MMHG | SYSTOLIC BLOOD PRESSURE: 129 MMHG

## 2020-10-08 VITALS — DIASTOLIC BLOOD PRESSURE: 56 MMHG | SYSTOLIC BLOOD PRESSURE: 88 MMHG

## 2020-10-08 VITALS — SYSTOLIC BLOOD PRESSURE: 110 MMHG | DIASTOLIC BLOOD PRESSURE: 58 MMHG

## 2020-10-08 VITALS — SYSTOLIC BLOOD PRESSURE: 127 MMHG | DIASTOLIC BLOOD PRESSURE: 43 MMHG

## 2020-10-08 LAB
ADD MANUAL DIFF: NO
ALBUMIN SERPL-MCNC: 1.2 G/DL (ref 3.4–5)
ALBUMIN/GLOB SERPL: 0.4 {RATIO} (ref 1–2.7)
ALP SERPL-CCNC: 69 U/L (ref 46–116)
ALT SERPL-CCNC: 34 U/L (ref 12–78)
ANION GAP SERPL CALC-SCNC: 6 MMOL/L (ref 5–15)
APTT BLD: 28 SEC (ref 23–33)
AST SERPL-CCNC: 22 U/L (ref 15–37)
BILIRUB SERPL-MCNC: 0.7 MG/DL (ref 0.2–1)
BUN SERPL-MCNC: 14 MG/DL (ref 7–18)
CALCIUM SERPL-MCNC: 7.1 MG/DL (ref 8.5–10.1)
CHLORIDE SERPL-SCNC: 110 MMOL/L (ref 98–107)
CO2 SERPL-SCNC: 25 MMOL/L (ref 21–32)
CREAT SERPL-MCNC: 0.6 MG/DL (ref 0.55–1.3)
ERYTHROCYTE [DISTWIDTH] IN BLOOD BY AUTOMATED COUNT: 17.8 % (ref 11.6–14.8)
GLOBULIN SER-MCNC: 3.3 G/DL
HCT VFR BLD CALC: 23.6 % (ref 37–47)
HGB BLD-MCNC: 7.7 G/DL (ref 12–16)
INR PPP: 1 (ref 0.9–1.1)
MCV RBC AUTO: 96 FL (ref 80–99)
PLATELET # BLD: 70 K/UL (ref 150–450)
POTASSIUM SERPL-SCNC: 3.6 MMOL/L (ref 3.5–5.1)
RBC # BLD AUTO: 2.46 M/UL (ref 4.2–5.4)
SODIUM SERPL-SCNC: 141 MMOL/L (ref 136–145)
WBC # BLD AUTO: 6.8 K/UL (ref 4.8–10.8)

## 2020-10-08 PROCEDURE — 0B110F4 BYPASS TRACHEA TO CUTANEOUS WITH TRACHEOSTOMY DEVICE, OPEN APPROACH: ICD-10-PCS

## 2020-10-08 RX ADMIN — PANTOPRAZOLE SODIUM SCH MG: 40 INJECTION, POWDER, FOR SOLUTION INTRAVENOUS at 08:03

## 2020-10-08 RX ADMIN — INSULIN ASPART SCH UNITS: 100 INJECTION, SOLUTION INTRAVENOUS; SUBCUTANEOUS at 11:46

## 2020-10-08 RX ADMIN — INSULIN DETEMIR SCH UNITS: 100 INJECTION, SOLUTION SUBCUTANEOUS at 08:04

## 2020-10-08 RX ADMIN — CHLORHEXIDINE GLUCONATE SCH APPLIC: 213 SOLUTION TOPICAL at 21:11

## 2020-10-08 RX ADMIN — PANTOPRAZOLE SODIUM SCH MG: 40 INJECTION, POWDER, FOR SOLUTION INTRAVENOUS at 21:10

## 2020-10-08 RX ADMIN — INSULIN ASPART SCH UNITS: 100 INJECTION, SOLUTION INTRAVENOUS; SUBCUTANEOUS at 18:00

## 2020-10-08 RX ADMIN — HYDROCORTISONE SODIUM SUCCINATE SCH MG: 100 INJECTION, POWDER, FOR SOLUTION INTRAMUSCULAR; INTRAVENOUS at 21:10

## 2020-10-08 RX ADMIN — HYDROCORTISONE SODIUM SUCCINATE SCH MG: 100 INJECTION, POWDER, FOR SOLUTION INTRAMUSCULAR; INTRAVENOUS at 08:02

## 2020-10-08 RX ADMIN — INSULIN DETEMIR SCH UNITS: 100 INJECTION, SOLUTION SUBCUTANEOUS at 21:19

## 2020-10-08 RX ADMIN — INSULIN ASPART SCH UNITS: 100 INJECTION, SOLUTION INTRAVENOUS; SUBCUTANEOUS at 06:00

## 2020-10-08 NOTE — NUR
NURSE NOTES:



Started tube feeding at a low rate as per Dr Nagy's order. O2 sat 100% on FiO2 65%. RR 
26-27. No bleeding from trach site. No respiratory distress noted.

## 2020-10-08 NOTE — NUR
NURSE NOTES:

spoke with VIVI armstrong. patient scheduled for tracheostomy with md mylene at 1200. 
preopchecklist completed. patient remains NPO since midnight.

## 2020-10-08 NOTE — NUR
NURSE NOTES:

Patient sleeping in bed with no signs of acute distress. Vitals stable to baseline. Will 
continue to monitor.

## 2020-10-08 NOTE — SURGERY PROGRESS NOTE
Surgery Progress Note


Subjective


Procedure Performed


Tracheostomy


Additional Comments


trach today





Objective





Last 24 Hour Vital Signs








  Date Time  Temp Pulse Resp B/P (MAP) Pulse Ox O2 Delivery O2 Flow Rate FiO2


 


10/8/20 15:29  52 24     65


 


10/8/20 15:00  52 22 106/54 (71) 100   


 


10/8/20 14:00  55 22 94/53 (67) 95   


 


10/8/20 13:45  56 22 91/41 (58) 95   


 


10/8/20 13:30  57 30 98/53 (68) 94   


 


10/8/20 13:15  62 20 88/56 (67) 96   


 


10/8/20 13:00  62 20 88/56 (67) 96   


 


10/8/20 13:00  59 31 84/43 (57) 97   


 


10/8/20 12:50  64 16  99   


 


10/8/20 12:45        65


 


10/8/20 12:45  58  83/40 (54)    


 


10/8/20 12:45      Mechanical Ventilator  





      Mechanical Ventilator  





      Mechanical Ventilator  


 


10/8/20 12:00        65


 


10/8/20 12:00  56      


 


10/8/20 11:50 98.5 65 24 126/66 (86) 98   


 


10/8/20 11:42  62 24     65


 


10/8/20 11:10      Mechanical Ventilator  





      Mechanical Ventilator  





      Mechanical Ventilator  


 


10/8/20 11:00  56 24 126/66 (86) 97   


 


10/8/20 10:00  53 21 125/55 (78) 97   


 


10/8/20 09:00  56 22 116/47 (70) 95   


 


10/8/20 08:00 98.7 52 25 125/54 (77) 97   


 


10/8/20 08:00  59      


 


10/8/20 08:00      Mechanical Ventilator  





      Mechanical Ventilator  





      Mechanical Ventilator  


 


10/8/20 08:00        65


 


10/8/20 07:44  63 24     65


 


10/8/20 07:00  56 21 127/43 (71) 99   


 


10/8/20 06:00  53 20 136/55 (82) 90   


 


10/8/20 05:00  53 21 129/53 (78) 96   


 


10/8/20 04:00        30


 


10/8/20 04:00  52      


 


10/8/20 04:00      Mechanical Ventilator  





      Mechanical Ventilator  





      Mechanical Ventilator  


 


10/8/20 04:00 98.5 50 21 128/66 (86) 95   


 


10/8/20 03:10  53 24     65


 


10/8/20 03:00  50 22 133/55 (81) 97   


 


10/8/20 02:00  53 25 139/62 (87) 97   


 


10/8/20 01:00  53 25 130/57 (81) 96   


 


10/8/20 00:00      Mechanical Ventilator  





      Mechanical Ventilator  





      Mechanical Ventilator  


 


10/8/20 00:00  54      


 


10/8/20 00:00        30


 


10/8/20 00:00 98.5 59 24 127/53 (77) 99   


 


10/7/20 23:00  55 24 130/56 (80) 97   


 


10/7/20 23:00  59 24     65


 


10/7/20 22:00  60 18 134/58 (83) 98   


 


10/7/20 21:00  55 24 135/60 (85) 99   


 


10/7/20 20:00        30


 


10/7/20 20:00      Mechanical Ventilator  





      Mechanical Ventilator  





      Mechanical Ventilator  


 


10/7/20 20:00  54      


 


10/7/20 20:00 98.3 54 24 113/50 (71) 99   


 


10/7/20 19:24  52 24     50


 


10/7/20 19:24        65


 


10/7/20 19:00  52 24 108/45 (66) 95   


 


10/7/20 18:00   24 108/45 (66) 91   


 


10/7/20 17:00  63 27 117/53 (74) 95   








I&O











Intake and Output  


 


 10/7/20 10/8/20





 19:00 07:00


 


Intake Total 1400 ml 1110 ml


 


Output Total 765 ml 795 ml


 


Balance 635 ml 315 ml


 


  


 


Free Water  160 ml


 


IV Total 750 ml 600 ml


 


Tube Feeding 650 ml 250 ml


 


Other  100 ml


 


Output Urine Total 760 ml 750 ml


 


Chest Tube Drainage Total 5 ml 45 ml


 


# Voids 60 


 


# Bowel Movements 4 3











Laboratory Tests








Test


 10/8/20


04:00


 


White Blood Count


 6.8 K/UL


(4.8-10.8)


 


Red Blood Count


 2.46 M/UL


(4.20-5.40)  L


 


Hemoglobin


 7.7 G/DL


(12.0-16.0)  L


 


Hematocrit


 23.6 %


(37.0-47.0)  L


 


Mean Corpuscular Volume 96 FL (80-99)  


 


Mean Corpuscular Hemoglobin


 31.4 PG


(27.0-31.0)  H


 


Mean Corpuscular Hemoglobin


Concent 32.7 G/DL


(32.0-36.0)


 


Red Cell Distribution Width


 17.8 %


(11.6-14.8)  H


 


Platelet Count


 70 K/UL


(150-450)  L


 


Mean Platelet Volume


 7.6 FL


(6.5-10.1)


 


Neutrophils (%) (Auto)


 % (45.0-75.0)





 


Lymphocytes (%) (Auto)


 % (20.0-45.0)





 


Monocytes (%) (Auto)  % (1.0-10.0)  


 


Eosinophils (%) (Auto)  % (0.0-3.0)  


 


Basophils (%) (Auto)  % (0.0-2.0)  


 


Prothrombin Time


 11.3 SEC


(9.30-11.50)


 


Prothromb Time International


Ratio 1.0 (0.9-1.1)  





 


Activated Partial


Thromboplast Time 28 SEC (23-33)





 


Sodium Level


 141 MMOL/L


(136-145)


 


Potassium Level


 3.6 MMOL/L


(3.5-5.1)


 


Chloride Level


 110 MMOL/L


()  H


 


Carbon Dioxide Level


 25 MMOL/L


(21-32)


 


Anion Gap


 6 mmol/L


(5-15)


 


Blood Urea Nitrogen


 14 mg/dL


(7-18)


 


Creatinine


 0.6 MG/DL


(0.55-1.30)


 


Estimat Glomerular Filtration


Rate > 60 mL/min


(>60)


 


Glucose Level


 158 MG/DL


()  H


 


Calcium Level


 7.1 MG/DL


(8.5-10.1)  L


 


Total Bilirubin


 0.7 MG/DL


(0.2-1.0)


 


Aspartate Amino Transf


(AST/SGOT) 22 U/L (15-37)





 


Alanine Aminotransferase


(ALT/SGPT) 34 U/L (12-78)





 


Alkaline Phosphatase


 69 U/L


()


 


Total Protein


 4.5 G/DL


(6.4-8.2)  L


 


Albumin


 1.2 G/DL


(3.4-5.0)  L


 


Globulin 3.3 g/dL  


 


Albumin/Globulin Ratio


 0.4 (1.0-2.7)


L











Plan


Problems:  


(1) Respiratory distress


Assessment & Plan:  Respiratory insufficiency requiring prolonged ventilator 

support.  Patient on minimal vent settings right now currently in stable but 

unfortunately not safe for extubation.  Tracheostomy is indicated recommended.  

I discussed case with pulmonology team ICU team medical teams.  Patient unable 

to make decisions and her current condition and given her history.  The care 

team has been contacted and will be reviewed for evaluation and consideration of

the tracheostomy.  If consented I think it is reasonable for tracheostomy given 

patient's current CODE STATUS care plan and goals of care.  Extubation his 

current status is potentially high risk for reintubation emergency complication.

 We will plan for tracheostomy if consent is obtained.  Thank you for let me 

participate patient's care will follow with recommendations





will plan for trach when ready


wean fi02 





(2) Encephalopathy chronic


(3) Dysphagia


(4) Down's syndrome


(5) Sepsis


(6) Acute respiratory failure


(7) Pneumonia


(8) Trisomy 21, Down syndrome


(9) DAVID (acute kidney injury)


(10) Bacteremia


(11) Hypokalemia


(12) Anemia


(13) Diarrhea


(14) Hypernatremia


(15) Pneumothorax


(16) Pneumothorax, right


Assessment & Plan:  right ptx.  s/p chest tube


tube removed 10/3





left ptx tube placed 10/2





(17) Cardiac arrest


(18) ARDS (adult respiratory distress syndrome)


(19) Septic shock


(20) HCAP (healthcare-associated pneumonia)


(21) Respiratory failure requiring intubation


(22) Seizure disorder


(23) Hypothyroidism











Jake Aranda                 Oct 8, 2020 16:15

## 2020-10-08 NOTE — NUR
NURSE NOTES:



Left Thoravent is connected to low continuos suction as per order. Output 45cc of serous 
fluid from collection box noted. Cleaned patient for another small amount of brown soft 
loose BM. Turned and repositioned patient.

## 2020-10-08 NOTE — NUR
Nurse Notes:

patient asleep arousable to light shaking but nonverbal with shiley 8 trach on ventilator AC 
24  FiO2 65%. BP97/64 HR45 Sinus Jose Daniel on monitor and afebrile. Left upper arm running 
1/2NS @50ml/hr asymptomatic. thoravent on left chest set to low continuous suction.  gtube 
running vital AF @20ml/hr with no residual. espinal catheter draining yellow urine. patient 
repositioned and oral care provided.

## 2020-10-08 NOTE — PULMONOLGY CRITICAL CARE NOTE
Critical Care - Asmt/Plan


Problems:  


(1) Acute respiratory failure


(2) Pneumothorax


Assessment & Plan:  resolved, left Chest tube still in. 





(3) Bacteremia


(4) Pneumonia


(5) Sepsis


(6) HCAP (healthcare-associated pneumonia)


(7) Seizure disorder


(8) Down's syndrome


(9) Trisomy 21, Down syndrome


Respiratory:  monitor respiratory rate, adjust FIO2, CXR


Cardiac:  continue to monitor HR/BP


Renal:  F/U I&O, keep IV fluid, check electrolytes


Infectious Disease:  check cultures, continue antibiotics


Gastrointestinal:  continue feedings/current rate


Hematologic:  monitor H/H, transfuse if hgb<8.5


Neurologic:  PRN Ativan, PRN Morphine, keep patient comfortable


Affect:  PRN ativan


Disposition:  keep in ICU


Time Spent (Minutes):  40


Notes Reviewed:  internist, cardio, renal


Discussed with:  nurses, consultants, 





Critical Care - Objective





Last 24 Hour Vital Signs








  Date Time  Temp Pulse Resp B/P (MAP) Pulse Ox O2 Delivery O2 Flow Rate FiO2


 


10/8/20 11:00  56 24 126/66 (86) 97   


 


10/8/20 10:00  53 21 125/55 (78) 97   


 


10/8/20 09:00  56 22 116/47 (70) 95   


 


10/8/20 08:00 98.7 52 25 125/54 (77) 97   


 


10/8/20 08:00  59      


 


10/8/20 08:00      Mechanical Ventilator  





      Mechanical Ventilator  





      Mechanical Ventilator  


 


10/8/20 08:00        65


 


10/8/20 07:44  63 24     65


 


10/8/20 07:00  56 21 127/43 (71) 99   


 


10/8/20 06:00  53 20 136/55 (82) 90   


 


10/8/20 05:00  53 21 129/53 (78) 96   


 


10/8/20 04:00        30


 


10/8/20 04:00  52      


 


10/8/20 04:00      Mechanical Ventilator  





      Mechanical Ventilator  





      Mechanical Ventilator  


 


10/8/20 04:00 98.5 50 21 128/66 (86) 95   


 


10/8/20 03:10  53 24     65


 


10/8/20 03:00  50 22 133/55 (81) 97   


 


10/8/20 02:00  53 25 139/62 (87) 97   


 


10/8/20 01:00  53 25 130/57 (81) 96   


 


10/8/20 00:00      Mechanical Ventilator  





      Mechanical Ventilator  





      Mechanical Ventilator  


 


10/8/20 00:00  54      


 


10/8/20 00:00        30


 


10/8/20 00:00 98.5 59 24 127/53 (77) 99   


 


10/7/20 23:00  55 24 130/56 (80) 97   


 


10/7/20 23:00  59 24     65


 


10/7/20 22:00  60 18 134/58 (83) 98   


 


10/7/20 21:00  55 24 135/60 (85) 99   


 


10/7/20 20:00        30


 


10/7/20 20:00      Mechanical Ventilator  





      Mechanical Ventilator  





      Mechanical Ventilator  


 


10/7/20 20:00  54      


 


10/7/20 20:00 98.3 54 24 113/50 (71) 99   


 


10/7/20 19:24  52 24     50


 


10/7/20 19:24        65


 


10/7/20 19:00  52 24 108/45 (66) 95   


 


10/7/20 18:00   24 108/45 (66) 91   


 


10/7/20 17:00  63 27 117/53 (74) 95   


 


10/7/20 16:00 98.5 61 22 116/53 (74) 92   


 


10/7/20 16:00        50


 


10/7/20 16:00  61      


 


10/7/20 16:00      Mechanical Ventilator  





      Mechanical Ventilator  





      Mechanical Ventilator  


 


10/7/20 15:00  56 28 94/46 (62) 94   


 


10/7/20 14:59  66 24     50


 


10/7/20 14:00  57 32 105/47 (66) 95   


 


10/7/20 13:00  55 25 104/45 (64) 95   


 


10/7/20 12:00        50


 


10/7/20 12:00  68      


 


10/7/20 12:00  68 26 113/59 (77) 93   


 


10/7/20 12:00      Mechanical Ventilator  





      Mechanical Ventilator  





      Mechanical Ventilator  








Status:  sedated


Condition:  critical


HEENT:  atraumatic, normocephalic


Lungs:  rales, rhonchi


Heart:  HR/BP stable


Abdomen:  soft, non-tender, feeding tube


Extremities:  no C/C/E, edema


Decubiti:  location


Micro:





Microbiology








 Date/Time


Source Procedure


Growth Status





 


 10/5/20 13:30


Urine,Clean Catch Urine Culture - Final


Pseudomonas Aeruginosa Complete





 10/5/20 13:30


Sputum Gram Stain - Final Complete


 


 10/5/20 13:30 Sputum Culture - Final


Proteus Mirabilis


Usual Respiratory Estrella Complete








Accucheck:  95





Critical Care - Subjective


ROS Limited/Unobtainable:  Yes


EKG Rhythm:  Sinus Rhythm


FI02:  65


Vent Support Breath Rate:  24


Vent Support Mode:  AC


Vent Tidal Volume:  500


Sputum Amount:  Moderate


PEEP:  0.0


PIP:  38


Tube Feeding Amount:  50


I&O:











Intake and Output  


 


 10/7/20 10/8/20





 18:59 06:59


 


Intake Total 1400 ml 1110 ml


 


Output Total 705 ml 750 ml


 


Balance 695 ml 360 ml


 


  


 


Free Water  160 ml


 


IV Total 800 ml 550 ml


 


Tube Feeding 600 ml 300 ml


 


Other  100 ml


 


Output Urine Total 700 ml 750 ml


 


Chest Tube Drainage Total 5 ml 


 


# Voids 60 


 


# Bowel Movements 4 3








CXR:


Bilateral infiltrates are unchanged.


ET-Tube:  7.5


ET Position:  23


Labs:





Laboratory Tests








Test


 10/8/20


04:00


 


White Blood Count


 6.8 K/UL


(4.8-10.8)


 


Red Blood Count


 2.46 M/UL


(4.20-5.40)  L


 


Hemoglobin


 7.7 G/DL


(12.0-16.0)  L


 


Hematocrit


 23.6 %


(37.0-47.0)  L


 


Mean Corpuscular Volume 96 FL (80-99)  


 


Mean Corpuscular Hemoglobin


 31.4 PG


(27.0-31.0)  H


 


Mean Corpuscular Hemoglobin


Concent 32.7 G/DL


(32.0-36.0)


 


Red Cell Distribution Width


 17.8 %


(11.6-14.8)  H


 


Platelet Count


 70 K/UL


(150-450)  L


 


Mean Platelet Volume


 7.6 FL


(6.5-10.1)


 


Neutrophils (%) (Auto)


 % (45.0-75.0)





 


Lymphocytes (%) (Auto)


 % (20.0-45.0)





 


Monocytes (%) (Auto)  % (1.0-10.0)  


 


Eosinophils (%) (Auto)  % (0.0-3.0)  


 


Basophils (%) (Auto)  % (0.0-2.0)  


 


Prothrombin Time


 11.3 SEC


(9.30-11.50)


 


Prothromb Time International


Ratio 1.0 (0.9-1.1)  





 


Activated Partial


Thromboplast Time 28 SEC (23-33)





 


Sodium Level


 141 MMOL/L


(136-145)


 


Potassium Level


 3.6 MMOL/L


(3.5-5.1)


 


Chloride Level


 110 MMOL/L


()  H


 


Carbon Dioxide Level


 25 MMOL/L


(21-32)


 


Anion Gap


 6 mmol/L


(5-15)


 


Blood Urea Nitrogen


 14 mg/dL


(7-18)


 


Creatinine


 0.6 MG/DL


(0.55-1.30)


 


Estimat Glomerular Filtration


Rate > 60 mL/min


(>60)


 


Glucose Level


 158 MG/DL


()  H


 


Calcium Level


 7.1 MG/DL


(8.5-10.1)  L


 


Total Bilirubin


 0.7 MG/DL


(0.2-1.0)


 


Aspartate Amino Transf


(AST/SGOT) 22 U/L (15-37)





 


Alanine Aminotransferase


(ALT/SGPT) 34 U/L (12-78)





 


Alkaline Phosphatase


 69 U/L


()


 


Total Protein


 4.5 G/DL


(6.4-8.2)  L


 


Albumin


 1.2 G/DL


(3.4-5.0)  L


 


Globulin 3.3 g/dL  


 


Albumin/Globulin Ratio


 0.4 (1.0-2.7)


L

















Chano Cherry MD               Oct 8, 2020 11:30

## 2020-10-08 NOTE — IMMEDIATE POST-OP EVALUATION
Immediate Post-Op Evalulation


Immediate Post-Op Evalulation


Procedure:  Tracheostomy


Date of Evaluation:  Oct 8, 2020


Time of Evaluation:  12:49


IV Fluids:  100


Blood Products:  none


Estimated Blood Loss:  min


Urinary Output:  none


Blood Pressure Systolic:  95


Blood Pressure Diastolic:  52


Pulse Rate:  64


Respiratory Rate:  16


O2 Sat by Pulse Oximetry:  99


Temperature (Fahrenheit):  97.4


Pain Score (1-10):  1


Nausea:  No


Vomiting:  No


Complications


none


Patient Status:  no response, ventilated, none


Hydration Status:  adequate











Kole Trinidad MD             Oct 8, 2020 12:50

## 2020-10-08 NOTE — NUR
NURSE NOTES:



Patient is awake and resting in bed. O2 sat 100% on FiO 2 65%. /73, HR 51, RR 29. Will 
continue to monitor.

## 2020-10-08 NOTE — OPERATIVE NOTE - PDOC
Operative Note


Operative Note


Date of Operation/Procedure:  Oct 8, 2020


Pre-op Diagnosis:


respirator insufficiency


Procedure:


Tracheostomy


Post-op Diagnosis:  same as pre-op


Surgeon:  Jake Aranda MD


Anesthesiologist:  Chaparro Trinidad MD


Anesthesia:  general, local


Specimen:  none


Complications:  none


Condition:  stable


Fluids:  none


Estimated Blood Loss:  minimal


Drains:  none


Implant(s) used?:  No


Indications for Procedure


60 female Risser insufficiency intubated prolonged period time multiple re-

intubations unable to wean safely tracheostomy indicated recommended consent 

obtained from caregivers patient scheduled once stable


Description of Procedure


Patient was taken directly from the intensive care unit to the operating room 

placed on the hospital bed in the supine position all bony parts well padded.  

Shoulder was placed in the neck was hyper extended.  Neck was prepped draped and

sent surgical fashion.  Anesthesiologist was present and provided general 

anesthesia and assistance with removal of the current ET tube.  Chest tube was 

in place and patient was optimized prior to coming to the operating room.  

Proper timeout taken identifying the patient procedure operating room staff and 

surgical staff.  Neck was prepped and draped in the same surgical fashion.  

Local anesthetic was infiltrated throughout the procedure for patient's comfort.

 Small skin incision was made 2 cm above the sternal notch and carried through 

the subcutaneous tissue and the platysma down to the median raphae.  The median 

raphae was divided the strap muscles mobilized laterally.  Trachea was 

identified and trach hook was placed.  For second and third tracheal rings were 

identified and dissected clearly.  Window was made between the first and second 

tracheal rings and the ET tube was identified.  With assistance his 

anesthesiologist ET tube was slowly withdrawn to above the window and a 8 Persian

Shiley tracheostomy tube was inserted under direct visualization without 

complication.  Balloon of the tracheostomy was insufflated and patient was 

ventilated through the tracheostomy with good end-tidal CO2 and volumes.  The 

lateral portions of the incision were reapproximated using 3-0 Monocryl sutures.

 Trach tie and dressings were applied.  Patient taught procedure well was taken 

to the intensive care unit in stable condition.











Jake Aranda                 Oct 8, 2020 16:15

## 2020-10-08 NOTE — INTERNAL MED PROGRESS NOTE
Subjective


Date of Service:  Oct 8, 2020


Physician Name


Sanya Schultz


Attending Physician


Chano Cherry MD





Current Medications








 Medications


  (Trade)  Dose


 Ordered  Sig/Didi


 Route


 PRN Reason  Start Time


 Stop Time Status Last Admin


Dose Admin


 


 Acetaminophen


  (Tylenol)  650 mg  Q4H  PRN


 GT


 For Pain  9/23/20 17:15


 10/23/20 17:14  10/2/20 17:46





 


 Chlorhexidine


 Gluconate


  (Beatrice-Hex 2%)  1 applic  DAILY@2000


 TOPIC


   8/31/20 20:00


 11/29/20 19:59  10/7/20 20:04





 


 Clotrimazole


  (Lotrimin)  1 applic  Q12HR


 TOPIC


   8/30/20 13:00


 11/28/20 12:59  10/8/20 08:03





 


 Dextrose


  (Dextrose 50%)  25 ml  Q30M  PRN


 IV


 Hypoglycemia  8/26/20 11:30


 11/20/20 11:29   





 


 Dextrose


  (Dextrose 50%)  50 ml  Q30M  PRN


 IV


 Hypoglycemia  8/26/20 11:30


 11/20/20 11:29   





 


 Hydrocortisone


  (Solu-CORTEF)  50 mg  EVERY 12  HOURS


 IV


   10/2/20 21:00


 12/27/20 20:59  10/8/20 08:02





 


 Insulin Aspart


  (NovoLOG)    Q6HR


 SUBQ


   9/18/20 12:00


 12/17/20 11:59  10/7/20 23:56





 


 Insulin Detemir


  (Levemir)  10 units  Q12HR


 SUBQ


   9/18/20 10:00


 12/17/20 09:59  10/8/20 08:04





 


 Levothyroxine


 Sodium


  (Synthroid)  75 mcg  DAILY@0630


 GT


   9/30/20 06:30


 10/30/20 06:29  10/8/20 05:52





 


 Loperamide HCl


  (Imodium)  2 mg  Q6H  PRN


 NG


 Diarrhea  9/16/20 10:30


 10/16/20 10:29  9/23/20 11:41





 


 Lorazepam


  (Ativan 2mg/ml


 1ml)  2 mg  Q4H  PRN


 IV


 For Anxiety  10/5/20 00:45


 10/12/20 00:44  10/5/20 03:06





 


 Pantoprazole


  (Protonix)  40 mg  EVERY 12  HOURS


 IVP


   9/28/20 21:00


 10/28/20 20:59  10/8/20 08:03





 


 Potassium Chloride


  (K-Dur)  40 meq  EVERY 12  HOURS


 GT


   9/26/20 21:00


 12/19/20 12:14  10/7/20 20:04





 


 Sodium Chloride  1,000 ml @ 


 50 mls/hr  Q20H


 IV


   10/4/20 11:00


 11/3/20 10:59  10/8/20 15:00











Allergies:  


Coded Allergies:  


     No Known Allergies (Unverified , 1/8/19)


ROS Limited/Unobtainable:  Yes


Subjective


60 YO F with Down's syndrome admitted with hypoxia.  Now sepsis and pneumonia.  

Cover for Int Phi-DR Hung.  ICU.  Intubated and sedated





Objective





Last Vital Signs








  Date Time  Temp Pulse Resp B/P (MAP) Pulse Ox O2 Delivery O2 Flow Rate FiO2


 


10/8/20 19:44  49 24     65


 


10/8/20 19:00    97/55 (69) 100   


 


10/8/20 17:00 98.6       


 


10/8/20 16:00      Mechanical Ventilator  





      Mechanical Ventilator  





      Mechanical Ventilator  











Laboratory Tests








Test


 10/8/20


04:00


 


White Blood Count


 6.8 K/UL


(4.8-10.8)


 


Red Blood Count


 2.46 M/UL


(4.20-5.40)  L


 


Hemoglobin


 7.7 G/DL


(12.0-16.0)  L


 


Hematocrit


 23.6 %


(37.0-47.0)  L


 


Mean Corpuscular Volume 96 FL (80-99)  


 


Mean Corpuscular Hemoglobin


 31.4 PG


(27.0-31.0)  H


 


Mean Corpuscular Hemoglobin


Concent 32.7 G/DL


(32.0-36.0)


 


Red Cell Distribution Width


 17.8 %


(11.6-14.8)  H


 


Platelet Count


 70 K/UL


(150-450)  L


 


Mean Platelet Volume


 7.6 FL


(6.5-10.1)


 


Neutrophils (%) (Auto)


 % (45.0-75.0)





 


Lymphocytes (%) (Auto)


 % (20.0-45.0)





 


Monocytes (%) (Auto)  % (1.0-10.0)  


 


Eosinophils (%) (Auto)  % (0.0-3.0)  


 


Basophils (%) (Auto)  % (0.0-2.0)  


 


Prothrombin Time


 11.3 SEC


(9.30-11.50)


 


Prothromb Time International


Ratio 1.0 (0.9-1.1)  





 


Activated Partial


Thromboplast Time 28 SEC (23-33)





 


Sodium Level


 141 MMOL/L


(136-145)


 


Potassium Level


 3.6 MMOL/L


(3.5-5.1)


 


Chloride Level


 110 MMOL/L


()  H


 


Carbon Dioxide Level


 25 MMOL/L


(21-32)


 


Anion Gap


 6 mmol/L


(5-15)


 


Blood Urea Nitrogen


 14 mg/dL


(7-18)


 


Creatinine


 0.6 MG/DL


(0.55-1.30)


 


Estimat Glomerular Filtration


Rate > 60 mL/min


(>60)


 


Glucose Level


 158 MG/DL


()  H


 


Calcium Level


 7.1 MG/DL


(8.5-10.1)  L


 


Total Bilirubin


 0.7 MG/DL


(0.2-1.0)


 


Aspartate Amino Transf


(AST/SGOT) 22 U/L (15-37)





 


Alanine Aminotransferase


(ALT/SGPT) 34 U/L (12-78)





 


Alkaline Phosphatase


 69 U/L


()


 


Total Protein


 4.5 G/DL


(6.4-8.2)  L


 


Albumin


 1.2 G/DL


(3.4-5.0)  L


 


Globulin 3.3 g/dL  


 


Albumin/Globulin Ratio


 0.4 (1.0-2.7)


L

















Intake and Output  


 


 10/7/20 10/8/20





 19:00 07:00


 


Intake Total 1400 ml 1110 ml


 


Output Total 765 ml 795 ml


 


Balance 635 ml 315 ml


 


  


 


Free Water  160 ml


 


IV Total 750 ml 600 ml


 


Tube Feeding 650 ml 250 ml


 


Other  100 ml


 


Output Urine Total 760 ml 750 ml


 


Chest Tube Drainage Total 5 ml 45 ml


 


# Voids 60 


 


# Bowel Movements 4 3








Objective


General Appearance:  WD/WN, no apparent distress, alert


EENT:  PERRL/EOMI, normal ENT inspection


Neck:  non-tender, normal alignment, supple, normal inspection


Cardiovascular:  normal peripheral pulses, normal rate, regular rhythm, no 

gallop/murmur, no JVD


Respiratory/Chest:  Mech vent; decreased breath sounds, crackles/rales, rhonchi 

- bilaterally, expiratory wheezing


Abdomen:  normal bowel sounds, non tender, soft, no organomegaly, no mass


Extremities:  normal range of motion


Neurologic:  CNs II-XII grossly normal


Skin:  normal pigmentation, warm/dry





Assessment/Plan


Problem List:  


(1) HCAP (healthcare-associated pneumonia)


Assessment & Plan:  Strep Group G.  S/P amikacin per ID=Dr Gomes.  

Pulmonary/Critical care=DR Cherry.  COVID NEG





(2) Sepsis


Assessment & Plan:  Staph haemolyticus.  S/P amikacin per ID=Dr Gomes





(3) Down's syndrome


(4) Dysphagia


Assessment & Plan:  S/P PEG





(5) Seizure disorder


Assessment & Plan:  Continue keppra and depakote





(6) Hypothyroidism


Assessment & Plan:  Continue synthroid





(7) Acute respiratory failure


Assessment & Plan:  Pulmonary = Dr Cherry; mech vent





(8) Pneumothorax, right


Assessment & Plan:  Continue chest tube per pulmonary





(9) Cardiac arrest


Assessment & Plan:  8/26/20-see cardiology note=Sanya Dunn MD                Oct 8, 2020 20:07

## 2020-10-08 NOTE — NEPHROLOGY PROGRESS NOTE
Assessment/Plan


Problem List:  


(1) DAVID (acute kidney injury)


(2) Respiratory failure requiring intubation


(3) Down's syndrome


(4) Seizure disorder


(5) Hypothyroidism


Assessment





Acute renal failure, likely due to hypotension


Acute respiratory distress, hypoxia


Seizure disorder


Hypothyroidism


Down syndrome


Full code











Fluid challenge with IV fluids and albumin


Midodrine for BP above 100 systolic


Check TSH level


Check


Correct level


Monitor renal parameters


Urine studies


Per orders


Plan


October 8: Discussed with RN.  Remains on ventilator.  Labs reviewed.  Stable 

from renal standpoint of view.  Patient due for tracheostomy when clinically 

stable.


October 7: On ventilator.  Left chest tube remains.  Full code.  Labs reviewed. 

Electrolyte abnormalities addressed.  Continue per consultants.


October 6: On ventilator.  Tracheostomy postponed because patient is clinically 

unstable.  Still have left chest tube draining.  Labs reviewed.  Electrolyte 

abnormalities addressed.  Continue per consultants.


October 5: Remains intubated on ventilator.  Discussed with RN.  Unclear if the 

patient is due for tracheostomy today or not.  Apparently the patient was 

reintubated again yesterday.  Patient has left chest tube.  Electrolyte 

abnormalities addressed.


October 4: Remains intubated on ventilator.  Due for tracheostomy tomorrow.  

Left chest tube present.  Patient is full code.  Labs reviewed.  Continue as is.


October 3: Remains intubated on ventilator.  Due for tracheostomy October 5.  

Has left-sided chest tube.  Stable from renal standpoint to view.  Continue per 

consultants.


October 2: Remains intubated on ventilator.  Electrolyte abnormalities 

addressed.  Supplements ordered.


October 1: Intubated on ventilator.  Labs reviewed.  Potassium supplement given.

 Remains bradycardic.  Continue per consultants.


September 30: Labs reviewed.  Electrolyte abnormalities addressed.  Heart rate 

remains bradycardic.  Continue per cardiology.  Continue to monitor renal 

parameters and electrolytes.


September 29: Labs reviewed.  Electrolyte abnormalities addressed and replaced. 

Medication list reviewed.  Heart rate remains mid 50s.  Continue as is.


September 28: On ventilator.  Full code.  Heart rate low.  Will discontinue 

Midodrin.  Continue to rest.  Electrolytes within normal limit.  Discussed with 

RN.


September 27: Remains intubated.  Full code.  No plan for tracheostomy yet.  

Labs reviewed.  All acceptable.  Continue same management


September 26: Labs reviewed.  Discussed with RN.  Potassium and phosphorus and 

magnesium replacement ordered.  Hemoglobin 9.5.  Patient remains full code.  

Continue per consultants.


September 25: Lab reviewed.  Renal parameters stable.  Full code.  Intubated.  

Electrolyte abnormalities addressed and replacement done.


September 24: Lab reviewed.  Renal parameters stable.  Full code.  Intubated.  

Has right side chest tube.  Continue per consultants.


September 23: Lab reviewed.  Renal parameters stable.  Full code.  Has right 

chest tube.  Planning process for tracheostomy.  Defer to chest and general 

surgeon.


September 22: Labs reviewed.  Renal parameters stable.  Remains full code.  

Remains on ventilator.  Due for tracheostomy tomorrow.  Continue per 

consultants.  Patient has a right chest tube in place at this time.


September 21: Labs reviewed.  Electrolyte imbalances addressed and supplemented.

 Remains full code and on ventilator.  Continue to monitor renal parameters.  

Continue per consultants.


September 20: Labs reviewed.  Serum potassium again low today.  Potassium 

supplement IV and through GT given.  Patient remains full code and is on 

ventilator.  Continue per consultants.  Continue to monitor electrolytes and 

renal parameters.


September 19: Labs reviewed.  Abnormal electrolyte addressed.  Remains full 

code.  Remains vented.


September 18: Day 27 of hospitalization.  Full code.  Labs reviewed.  Hemoglobin

down to 7.5.  Electrolyte abnormalities addressed and corrections ordered.  

Continue to monitor renal parameters.  Continue per consultants.  Start on 

Levemir for blood sugar management.  Questioning continuation of hydrocortisone?


September 17: Labs reviewed.  Potassium, phosphorus, hemoglobin, are all low.  

Potassium and phosphorus IV replacement given.  Continue to monitor electrolytes

and CBC.  Patient remains full code.


September 16: Lab reviewed.  Low phosphorus low magnesium and low potassium was 

addressed.  Hemoglobin drifting lower.  Continue per consultants.  Patient 

remains full code.


September 15: Labs reviewed.  Patient continues to be on ventilator.  D5W for 

high sodium and also potassium chloride intravenously as supplement given.  

Hemoglobin 8.4.  Continue to monitor electrolytes and renal parameters.


September 14: Labs reviewed.  Low potassium and high sodium noted.  Hemoglobin 

8.1 stable.  Aim to correct abnormal electrolyte.  Continue rest.  Will give 2 

boluses of D5W 500 cc.


September 13: Lab reviewed.  Abnormal electrolytes noted and addressed.


September 12: Labs reviewed.  Potassium supplement given.  Patient remains full 

code.  Continue per consultants.


September 11: Lab reviewed.  Electrolyte abnormalities addressed.  Continue per 

pulmonary and ID.


September 10: Lab reviewed.  Status unchanged.  Serum sodium 151 unchanged.  

Stable from renal standpoint of view.


September 9: Labs reviewed.  Status quo.  D5W 500 cc IV ordered.  Continue to 

monitor renal parameters.


September 8: Status quo.  Labs reviewed.  Overall condition unchanged.  Patient 

was transfused and hemoglobin higher.  Continue current management.  Patient r

jayna full code.


September 7: Status quo.  Overall condition poor.  Very low albumin.  Edematous.

 Hypotensive.  Hemoglobin lower.  Anemia work-up ordered.  I favor transfusion 2

units of packed RBCs.  Patient remains full code.  I favor supportive care only.

 Will discuss.


September 6: Electrolyte abnormalities addressed.  Serum creatinine lower.  

Continue per current management.


September 5: Status unchanged.  Lab reviewed.  Serum potassium 2.7.  IV 

potassium chloride ordered.  Serum creatinine low at 1.6 stable.  Blood pressure

90s systolic


September 4: Status quo.  Labs reviewed.  Renal parameters stable.  Serum 

creatinine down to 1.6.  Medication list reviewed.  Continues to be on 

midodrine.  Continue per consultants.


September 3: Status quo.  Labs reviewed.  Electrolytes adjusted.  Serum 

creatinine down to 1.8.  Continue per consultants.


September 2: Status quo.  Labs reviewed.  Phosphorus supplement IV given.  Serum

creatinine 2.  Continue per consultants.


September 1: Requires less pressors.  Albumin bolus given.  1 dose of Lasix IV 

ordered as the patient severely edematous.  Patient serum albumin is very low.  

Continue per consultants.


August 31: Continues to be intubated.  Labs reviewed.  Serum creatinine 1.9 

unchanged.  Blood pressure more stable.  Off 1 of the pressors.  Continue to 

monitor renal parameters.  Continue per consultants.  Patient now on 

hydrocortisone 100 mg every 8 hours.  Will decrease IV fluid.  Normal saline 

down to 50 cc an hour.


August 30: Intubated.  Labs reviewed.  Creatinine 1.9 unchanged.  Continue same 

treatment plan.  Per consultants.  Overall poor prognosis since the patient 

remains on pressors and her pulmonary status is worsening.


August 29: Remains intubated.  Labs reviewed.  Creatinine 1.9.  Blood pressure 

systolic 90s.  Continue per consultants.


August 28: Remains intubated.  Labs reviewed.  Serum creatinine lower to 2.  

Vancomycin level lower.  Remains hypotensive on pressors.  Will increase 

midodrine to 10 mg every 8 hours.  Continue per consultants.  Continue to mon

itor renal parameters.


August 27: Patient now in ICU.  Intubated.  On pressors.  Labs reviewed.  Will 

increase midodrine.  Aim to keep blood pressure over 100 systolic.  Will give 

albumin bolus.  Will check vancomycin level which was elevated when checked 

previously on August 24.  Will monitor renal parameters.  Continue per 

consultants.





Subjective


ROS Limited/Unobtainable:  Yes





Objective


Objective





Last 24 Hour Vital Signs








  Date Time  Temp Pulse Resp B/P (MAP) Pulse Ox O2 Delivery O2 Flow Rate FiO2


 


10/8/20 09:00  56 22 116/47 (70) 95   


 


10/8/20 08:00 98.7 52 25 125/54 (77) 97   


 


10/8/20 08:00  59      


 


10/8/20 08:00      Mechanical Ventilator  





      Mechanical Ventilator  





      Mechanical Ventilator  


 


10/8/20 08:00        30


 


10/8/20 07:44  63 24     65


 


10/8/20 07:00  56 21 127/43 (71) 99   


 


10/8/20 06:00  53 20 136/55 (82) 90   


 


10/8/20 05:00  53 21 129/53 (78) 96   


 


10/8/20 04:00        30


 


10/8/20 04:00  52      


 


10/8/20 04:00      Mechanical Ventilator  





      Mechanical Ventilator  





      Mechanical Ventilator  


 


10/8/20 04:00 98.5 50 21 128/66 (86) 95   


 


10/8/20 03:10  53 24     65


 


10/8/20 03:00  50 22 133/55 (81) 97   


 


10/8/20 02:00  53 25 139/62 (87) 97   


 


10/8/20 01:00  53 25 130/57 (81) 96   


 


10/8/20 00:00      Mechanical Ventilator  





      Mechanical Ventilator  





      Mechanical Ventilator  


 


10/8/20 00:00  54      


 


10/8/20 00:00        30


 


10/8/20 00:00 98.5 59 24 127/53 (77) 99   


 


10/7/20 23:00  55 24 130/56 (80) 97   


 


10/7/20 23:00  59 24     65


 


10/7/20 22:00  60 18 134/58 (83) 98   


 


10/7/20 21:00  55 24 135/60 (85) 99   


 


10/7/20 20:00        30


 


10/7/20 20:00      Mechanical Ventilator  





      Mechanical Ventilator  





      Mechanical Ventilator  


 


10/7/20 20:00  54      


 


10/7/20 20:00 98.3 54 24 113/50 (71) 99   


 


10/7/20 19:24  52 24     50


 


10/7/20 19:24        65


 


10/7/20 19:00  52 24 108/45 (66) 95   


 


10/7/20 18:00   24 108/45 (66) 91   


 


10/7/20 17:00  63 27 117/53 (74) 95   


 


10/7/20 16:00 98.5 61 22 116/53 (74) 92   


 


10/7/20 16:00        50


 


10/7/20 16:00  61      


 


10/7/20 16:00      Mechanical Ventilator  





      Mechanical Ventilator  





      Mechanical Ventilator  


 


10/7/20 15:00  56 28 94/46 (62) 94   


 


10/7/20 14:59  66 24     50


 


10/7/20 14:00  57 32 105/47 (66) 95   


 


10/7/20 13:00  55 25 104/45 (64) 95   


 


10/7/20 12:00        50


 


10/7/20 12:00  68      


 


10/7/20 12:00  68 26 113/59 (77) 93   


 


10/7/20 12:00      Mechanical Ventilator  





      Mechanical Ventilator  





      Mechanical Ventilator  


 


10/7/20 11:08  61 24     50


 


10/7/20 11:00  62 28 115/56 (75) 95   


 


10/7/20 10:00  65 24 123/53 (76) 94   


 


10/7/20 10:00  65 27 123/53 (76) 95   

















Intake and Output  


 


 10/7/20 10/8/20





 18:59 06:59


 


Intake Total 1400 ml 1110 ml


 


Output Total 705 ml 750 ml


 


Balance 695 ml 360 ml


 


  


 


Free Water  160 ml


 


IV Total 800 ml 550 ml


 


Tube Feeding 600 ml 300 ml


 


Other  100 ml


 


Output Urine Total 700 ml 750 ml


 


Chest Tube Drainage Total 5 ml 


 


# Voids 60 


 


# Bowel Movements 4 3








Laboratory Tests


10/8/20 04:00: 


White Blood Count 6.8, Red Blood Count 2.46L, Hemoglobin 7.7L, Hematocrit 23.6L,

Mean Corpuscular Volume 96, Mean Corpuscular Hemoglobin 31.4H, Mean Corpuscular 

Hemoglobin Concent 32.7, Red Cell Distribution Width 17.8H, Platelet Count 70L, 

Mean Platelet Volume 7.6, Neutrophils (%) (Auto) , Lymphocytes (%) (Auto) , 

Monocytes (%) (Auto) , Eosinophils (%) (Auto) , Basophils (%) (Auto) , 

Prothrombin Time 11.3, Prothromb Time International Ratio 1.0, Activated Partial

Thromboplast Time 28, Sodium Level 141, Potassium Level 3.6, Chloride Level 110H

, Carbon Dioxide Level 25, Anion Gap 6, Blood Urea Nitrogen 14, Creatinine 0.6, 

Estimat Glomerular Filtration Rate > 60, Glucose Level 158H, Calcium Level 7.1L,

Total Bilirubin 0.7, Aspartate Amino Transf (AST/SGOT) 22, Alanine 

Aminotransferase (ALT/SGPT) 34, Alkaline Phosphatase 69, Total Protein 4.5L, 

Albumin 1.2L, Globulin 3.3, Albumin/Globulin Ratio 0.4L


Height (Feet):  5


Height (Inches):  3.00


Weight (Pounds):  173


General Appearance:  no apparent distress


EENT:  other - Intubated on ventilator


Cardiovascular:  normal rate


Respiratory/Chest:  decreased breath sounds


Abdomen:  distended











Lam Nino MD             Oct 8, 2020 09:59

## 2020-10-08 NOTE — NUR
CASE MANAGEMENT: REVIEW





SI: ARDS . RESP FAILURE . DOWN'S SYNDROME . PNEUMOTHORAX 

TRACHEOSTOMY 10/08 

RIGHT CHEST TUBE 10/02 

T 98.5 HR 50 RR 25 /55 SAT 90% MECH VENT FIO2 65

H/H 7.7/23.6 GLUCOSE 158 

ABG: PH 7.524 PCO2 29.1 PO2 100.2 HCO3 23.4 









IS: NS IVF @ 50ML/HR

SOLU CORTEF IV Q12HR

PROTONIX IV Q12HR









***ICU STATUS***

DCP: PATIENT IS FROM [facility]

## 2020-10-08 NOTE — NUR
NURSE HAND-OFF REPORT: 



Latest Vital Signs: Temperature 98.6 , Pulse 47 , B/P 97 /55 , Respiratory Rate 27 , O2 SAT 
100 , Mechanical Ventilator, O2 Flow Rate 65%.  

Vital Sign Comment: 



EKG Rhythm: Sinus Bradycardia

Rhythm change?: N 

MD Notified?: 

MD Response: 



Latest Momin Fall Score: 70  

Fall Risk: High Risk 

Safety Measures: Call light Within Reach, Bed Alarm Zone 1, Side Rails Side Rails x3, Bed 
position Low and Locked.

Fall Precautions: 

Yellow Socks



Report given to LAYTON Steven

## 2020-10-08 NOTE — NUR
NURSE NOTES:



BS 70. 120cc of orange juice given as per protocol. Will recheck sugar in 15 minutes.

## 2020-10-08 NOTE — DIAGNOSTIC IMAGING REPORT
Indication: Dyspnea

 

Technique: One view of the chest

 

Comparison: 10/7/2020

 

Findings: Left chest vent catheter is again demonstrated. No pneumothorax.

Tracheostomy, left arm PICC, bilateral infiltrates are all unchanged.

 

Impression:  Unchanged, over one day, findings as above.

## 2020-10-08 NOTE — NUR
NURSE NOTES:



Patient came back from OR with tracheostomy. No bleeding noted. Shiley 8 with the same vent 
setting. O2 sat 98%, RR 34. BP 83/40. Dr Trinidad aware. Reconnected chest tube, IV, and 
monitor. Repositioned patient. Patient is still sedated.

## 2020-10-08 NOTE — NUR
NURSE NOTES:

performed oral care and suctioned patient. patient presents with thick clear secretions. 
repositioned for comfort.

## 2020-10-08 NOTE — INFECTIOUS DISEASES PROG NOTE
Assessment/Plan





ASSESSMENT:


sp code blue 8/26


Septic Shock; SP


Fever, recurrent- low grade -SP


Leukocytosis; recurrent; SP\


     10/5 ucx PsA (I Genta)


            sp cx P, mirabilsi (R levo)


     -9/19 Bcx Neg


   -9/9 u/a no pyuria


   -9/8 Bcx Neg(Picc line)


   -9/6 Bcx Neg


            ucx Neg


             sp cx C. parapsilopsis


  -8/26 u/a no pyuria





Pneumonia.- COVID 19 neg x3


   Acute hypoxic resp failure on VM> NRB 15l 100%; hypoxic on ABG> now VDRF 

8/26- Fio2 80% >100% 8/28> 60% 9/1 >80% 9/2   >95% 9/10 >90% 9/14 >60% 9/15


R tension Pneumothroax sp CT 9/21


      10/5 CXR: Extensive bilateral airspace opacities, right greater than left,

 consistent with multifocal infiltrate worse pulmonary edema.  This is  similar 

in appearance to the prior study. Worsening, small left pleural effusion.  This 

may be secondary to  redistribution as the right-sided pleural effusion has 

mildly improved. 


       -9/22 CXR: Interim complete reexpansion of right lung without evidence of

residual pneumothorax. Bilateral diffuse and extensive infiltrates. Markedly 

improved chest wall subcutaneous emphysema


       -9/21 CXR: Interim reexpansion of previously demonstrated right 

pneumothorax, status post large bore chest tube placement.


      -9/14 CXR: Improved aeration of both lungs.


       -9/5 CXR:Small bilateral pleural effusions with minor edema.  Stable ed

ema with  mild worsening in the degree of pleural effusion on the right.


         -9/1 sp cx Neg


         8/31 CXR: Extensive bilateral interstitial and airspace disease appears

similar to the prior exam. Moderate to large bilateral pleural effusions appear 

unchanged.


   8/28 CXR: Increasing left upper lobe dense consolidation and likely 

increasing bilateral pleural fluid. Persistent diffuse dense consolidation 

elsewhere


            -8/26 sp cx normal resp lilliam


             -8/25 CXR: Increased atelectasis of the right lung, since prior 

exam of 3 days earlier. New or increased right pleural effusion. Increased left 

basilar consolidation and/or pleural fluid 


          -COVID Rapid PCR neg 8/23, 8/23, 8/26


          -8/22 spc x Group G strep


          -8/22 CXR:   Reduced lung volumes.  Patchy bilateral predominantly 

interstitial  pulmonary opacities.  Could be from edema and/or pneumonia.  There

is a broader differential.


 


        -legionella ag urine, blasto ab, Histo ab, HIV ab screen, JOY, ANCA neg





Persistent, high grade bacteremia-


     -8/22 Bcx 4/4 sets S. haemolyticus; 8/23 Bcx 3/4 S/ epi; 8/27 Bcx 1.4 S. 

warnerri; 8/29 Bcx Neg


     -2d echo: no vegetaions seen





          ua/ wbc 10-15, nit neg, leuk +1; ucx Neg





DAVID; 


 -supratherapeutic vanco levels





-Seizure disorder.


- Hypothyroidism.


- Down syndrome.





History of PEG tube placement.


[facility] resident








PLAN:


Monitor off abx unless febrile, increasing WBC and/or HD unstable





          9/14 SP Meropenem #18, IV Amikacin #7


          9/4/20 SP Daptomycin #11


          9/2 SP MIcafungin #7, Linezolid #5


   8/28 SP Azithromycin #7/7


   8/26 SP Ceftriaxone #2


   8/25 SP IV Vancomycin #4, Zosyn #4


   8/22 SP Cefepime x1, Flagyl x1





- Monitor CBC, BMP.


.f/u cx


- COVID neg x3


- Monitor chest x-ray.











Thank you, Dr. Cherry, for allowing me to participate in the care of


this patient.  I will follow the patient with you at this


hospitalization.








Discussed with RN





Subjective


Allergies:  


Coded Allergies:  


     No Known Allergies (Unverified , 1/8/19)


afebrile


remains intubated; Fio2 50%


no leukocytosis





Objective





Last 24 Hour Vital Signs








  Date Time  Temp Pulse Resp B/P (MAP) Pulse Ox O2 Delivery O2 Flow Rate FiO2


 


10/8/20 18:00  54 18 119/76 (90) 100   


 


10/8/20 17:00  48 17 96/72 (80) 100   


 


10/8/20 16:00  47      


 


10/8/20 16:00  47 24 105/61 (76) 100   


 


10/8/20 16:00      Mechanical Ventilator  





      Mechanical Ventilator  





      Mechanical Ventilator  


 


10/8/20 15:29  52 24     65


 


10/8/20 15:00  52 22 106/54 (71) 100   


 


10/8/20 14:00  55 22 94/53 (67) 95   


 


10/8/20 13:45  56 22 91/41 (58) 95   


 


10/8/20 13:30  57 30 98/53 (68) 94   


 


10/8/20 13:15  62 20 88/56 (67) 96   


 


10/8/20 13:00  62 20 88/56 (67) 96   


 


10/8/20 13:00  59 31 84/43 (57) 97   


 


10/8/20 12:50  64 16  99   


 


10/8/20 12:45        65


 


10/8/20 12:45  58  83/40 (54)    


 


10/8/20 12:45      Mechanical Ventilator  





      Mechanical Ventilator  





      Mechanical Ventilator  


 


10/8/20 12:00        65


 


10/8/20 12:00  56      


 


10/8/20 11:50 98.5 65 24 126/66 (86) 98   


 


10/8/20 11:42  62 24     65


 


10/8/20 11:10      Mechanical Ventilator  





      Mechanical Ventilator  





      Mechanical Ventilator  


 


10/8/20 11:00  56 24 126/66 (86) 97   


 


10/8/20 10:00  53 21 125/55 (78) 97   


 


10/8/20 09:00  56 22 116/47 (70) 95   


 


10/8/20 08:00 98.7 52 25 125/54 (77) 97   


 


10/8/20 08:00  59      


 


10/8/20 08:00      Mechanical Ventilator  





      Mechanical Ventilator  





      Mechanical Ventilator  


 


10/8/20 08:00        65


 


10/8/20 07:44  63 24     65


 


10/8/20 07:00  56 21 127/43 (71) 99   


 


10/8/20 06:00  53 20 136/55 (82) 90   


 


10/8/20 05:00  53 21 129/53 (78) 96   


 


10/8/20 04:00        30


 


10/8/20 04:00  52      


 


10/8/20 04:00      Mechanical Ventilator  





      Mechanical Ventilator  





      Mechanical Ventilator  


 


10/8/20 04:00 98.5 50 21 128/66 (86) 95   


 


10/8/20 03:10  53 24     65


 


10/8/20 03:00  50 22 133/55 (81) 97   


 


10/8/20 02:00  53 25 139/62 (87) 97   


 


10/8/20 01:00  53 25 130/57 (81) 96   


 


10/8/20 00:00      Mechanical Ventilator  





      Mechanical Ventilator  





      Mechanical Ventilator  


 


10/8/20 00:00  54      


 


10/8/20 00:00        30


 


10/8/20 00:00 98.5 59 24 127/53 (77) 99   


 


10/7/20 23:00  55 24 130/56 (80) 97   


 


10/7/20 23:00  59 24     65


 


10/7/20 22:00  60 18 134/58 (83) 98   


 


10/7/20 21:00  55 24 135/60 (85) 99   


 


10/7/20 20:00        30


 


10/7/20 20:00      Mechanical Ventilator  





      Mechanical Ventilator  





      Mechanical Ventilator  


 


10/7/20 20:00  54      


 


10/7/20 20:00 98.3 54 24 113/50 (71) 99   


 


10/7/20 19:24  52 24     50


 


10/7/20 19:24        65


 


10/7/20 19:00  52 24 108/45 (66) 95   








Height (Feet):  5


Height (Inches):  3.00


Weight (Pounds):  173


HEENT:  . ETT in place


CHEST:  Coarse breathing sounds.


HEART:  S1 and S2.


ABDOMEN:  Soft.  PEG tube in place.


SKIN: no rash





Laboratory Tests








Test


 10/8/20


04:00


 


White Blood Count


 6.8 K/UL


(4.8-10.8)


 


Red Blood Count


 2.46 M/UL


(4.20-5.40)  L


 


Hemoglobin


 7.7 G/DL


(12.0-16.0)  L


 


Hematocrit


 23.6 %


(37.0-47.0)  L


 


Mean Corpuscular Volume 96 FL (80-99)  


 


Mean Corpuscular Hemoglobin


 31.4 PG


(27.0-31.0)  H


 


Mean Corpuscular Hemoglobin


Concent 32.7 G/DL


(32.0-36.0)


 


Red Cell Distribution Width


 17.8 %


(11.6-14.8)  H


 


Platelet Count


 70 K/UL


(150-450)  L


 


Mean Platelet Volume


 7.6 FL


(6.5-10.1)


 


Neutrophils (%) (Auto)


 % (45.0-75.0)





 


Lymphocytes (%) (Auto)


 % (20.0-45.0)





 


Monocytes (%) (Auto)  % (1.0-10.0)  


 


Eosinophils (%) (Auto)  % (0.0-3.0)  


 


Basophils (%) (Auto)  % (0.0-2.0)  


 


Prothrombin Time


 11.3 SEC


(9.30-11.50)


 


Prothromb Time International


Ratio 1.0 (0.9-1.1)  





 


Activated Partial


Thromboplast Time 28 SEC (23-33)





 


Sodium Level


 141 MMOL/L


(136-145)


 


Potassium Level


 3.6 MMOL/L


(3.5-5.1)


 


Chloride Level


 110 MMOL/L


()  H


 


Carbon Dioxide Level


 25 MMOL/L


(21-32)


 


Anion Gap


 6 mmol/L


(5-15)


 


Blood Urea Nitrogen


 14 mg/dL


(7-18)


 


Creatinine


 0.6 MG/DL


(0.55-1.30)


 


Estimat Glomerular Filtration


Rate > 60 mL/min


(>60)


 


Glucose Level


 158 MG/DL


()  H


 


Calcium Level


 7.1 MG/DL


(8.5-10.1)  L


 


Total Bilirubin


 0.7 MG/DL


(0.2-1.0)


 


Aspartate Amino Transf


(AST/SGOT) 22 U/L (15-37)





 


Alanine Aminotransferase


(ALT/SGPT) 34 U/L (12-78)





 


Alkaline Phosphatase


 69 U/L


()


 


Total Protein


 4.5 G/DL


(6.4-8.2)  L


 


Albumin


 1.2 G/DL


(3.4-5.0)  L


 


Globulin 3.3 g/dL  


 


Albumin/Globulin Ratio


 0.4 (1.0-2.7)


L











Current Medications








 Medications


  (Trade)  Dose


 Ordered  Sig/Didi


 Route


 PRN Reason  Start Time


 Stop Time Status Last Admin


Dose Admin


 


 Acetaminophen


  (Tylenol)  650 mg  Q4H  PRN


 GT


 For Pain  9/23/20 17:15


 10/23/20 17:14  10/2/20 17:46





 


 Chlorhexidine


 Gluconate


  (Beatrice-Hex 2%)  1 applic  DAILY@2000


 TOPIC


   8/31/20 20:00


 11/29/20 19:59  10/7/20 20:04





 


 Clotrimazole


  (Lotrimin)  1 applic  Q12HR


 TOPIC


   8/30/20 13:00


 11/28/20 12:59  10/8/20 08:03





 


 Dextrose


  (Dextrose 50%)  25 ml  Q30M  PRN


 IV


 Hypoglycemia  8/26/20 11:30


 11/20/20 11:29   





 


 Dextrose


  (Dextrose 50%)  50 ml  Q30M  PRN


 IV


 Hypoglycemia  8/26/20 11:30


 11/20/20 11:29   





 


 Hydrocortisone


  (Solu-CORTEF)  50 mg  EVERY 12  HOURS


 IV


   10/2/20 21:00


 12/27/20 20:59  10/8/20 08:02





 


 Insulin Aspart


  (NovoLOG)    Q6HR


 SUBQ


   9/18/20 12:00


 12/17/20 11:59  10/7/20 23:56





 


 Insulin Detemir


  (Levemir)  10 units  Q12HR


 SUBQ


   9/18/20 10:00


 12/17/20 09:59  10/8/20 08:04





 


 Levothyroxine


 Sodium


  (Synthroid)  75 mcg  DAILY@0630


 GT


   9/30/20 06:30


 10/30/20 06:29  10/8/20 05:52





 


 Loperamide HCl


  (Imodium)  2 mg  Q6H  PRN


 NG


 Diarrhea  9/16/20 10:30


 10/16/20 10:29  9/23/20 11:41





 


 Lorazepam


  (Ativan 2mg/ml


 1ml)  2 mg  Q4H  PRN


 IV


 For Anxiety  10/5/20 00:45


 10/12/20 00:44  10/5/20 03:06





 


 Pantoprazole


  (Protonix)  40 mg  EVERY 12  HOURS


 IVP


   9/28/20 21:00


 10/28/20 20:59  10/8/20 08:03





 


 Potassium Chloride


  (K-Dur)  40 meq  EVERY 12  HOURS


 GT


   9/26/20 21:00


 12/19/20 12:14  10/7/20 20:04





 


 Sodium Chloride  1,000 ml @ 


 50 mls/hr  Q20H


 IV


   10/4/20 11:00


 11/3/20 10:59  10/8/20 15:00




















Rema Gomes M.D.             Oct 8, 2020 18:21

## 2020-10-08 NOTE — NUR
Nurse Notes: 

Patient received from Mi-Yeon, RN. patient asleep arousable to light shaking but nonverbal 
with Shiley 8 trach on ventilator AC 24  FiO2 65%. BP97/55 HR47 Sinus Jose Daniel on monitor 
and afebrile. Left upper arm running 1/2NS @50ml/hr asymptomatic. thoravent on left chest 
set to low continuous suction.  gtube running vital AF @10ml/hr. Valle catheter draining 
yellow urine. SCD's on, patient on p200 mattress. sacral and perineal redness noted. patient 
repositioned and oral care provided.

## 2020-10-08 NOTE — NUR
NURSE NOTES:



Report received from Noble Eng RN. Afebrile. Patient is awake and confused. Continued 
bilateral soft restraints. Opens eyes spontaneously. Non-verbal. Unable to follow commands. 
SB 50's on cardiac monitor. ETT 7.5/24, AC 24, , FiO2 65%. O2 sat %. Small clear 
thick secretion noted from mouth. GT in place and kept NPO for tracheostomy today. Valle in 
place draining to gravity. CARLOS PICC line patent and asymptomatic. 1/2NS is running at 
50cc/hr. Bed in lowest position. Side rails up x3. Will resume plan fo care.

-------------------------------------------------------------------------------

Addendum: 10/08/20 at 1221 by MI YEON YOON, RN RN

-------------------------------------------------------------------------------

NURSE NOTES:



Report received from Noble Eng RN. Afebrile. Patient is awake and confused. Continued 
bilateral soft restraints. Opens eyes spontaneously. Non-verbal. Unable to follow commands. 
SB 50's on cardiac monitor. ETT 7.5/24, AC 24, , FiO2 65%. O2 sat 96-98%. Small clear 
thick secretion noted from mouth. Left chest ThoraVent is connected to intermittent suction 
as per order, draining serous fluid. GT in place and kept NPO for tracheostomy today. Valle 
in place draining to gravity. CARLOS PICC line patent and asymptomatic. 1/2NS is running at 
50cc/hr. Bed in lowest position. Side rails up x3. Will resume plan fo care.

## 2020-10-08 NOTE — CARDIOLOGY PROGRESS NOTE
Assessment/Plan


Assessment/Plan


sepsis


respiratory failure 


ards 


renal insuf 


bacteremia


abn cardiac enzyme due to demand 


sinus arnold stable 


thrombocytopenia resolved  


hypernatremia 


pneumothorax now s/p chest tube on the left now 


thrombocytopenia 








vent support 


abx 


s/p trach today 


tsh seems fine 


remains off pressor still at this time 


hr inthe 60's s no sig pauses on tele 


tele personally reviewed 


left thoravent


trach plans 10/8


bp borderlines 


cxr noted 


now on 60%


watch plt  


 








Subjective


Subjective


on  a vent not communicative not  AWAKE





Objective





Last 24 Hour Vital Signs








  Date Time  Temp Pulse Resp B/P (MAP) Pulse Ox O2 Delivery O2 Flow Rate FiO2


 


10/8/20 18:00  54 18 119/76 (90) 100   


 


10/8/20 17:00  48 17 96/72 (80) 100   


 


10/8/20 16:00  47      


 


10/8/20 16:00  47 24 105/61 (76) 100   


 


10/8/20 16:00      Mechanical Ventilator  





      Mechanical Ventilator  





      Mechanical Ventilator  


 


10/8/20 15:29  52 24     65


 


10/8/20 15:00  52 22 106/54 (71) 100   


 


10/8/20 14:00  55 22 94/53 (67) 95   


 


10/8/20 13:45  56 22 91/41 (58) 95   


 


10/8/20 13:30  57 30 98/53 (68) 94   


 


10/8/20 13:15  62 20 88/56 (67) 96   


 


10/8/20 13:00  62 20 88/56 (67) 96   


 


10/8/20 13:00  59 31 84/43 (57) 97   


 


10/8/20 12:50  64 16  99   


 


10/8/20 12:45        65


 


10/8/20 12:45  58  83/40 (54)    


 


10/8/20 12:45      Mechanical Ventilator  





      Mechanical Ventilator  





      Mechanical Ventilator  


 


10/8/20 12:00        65


 


10/8/20 12:00  56      


 


10/8/20 11:50 98.5 65 24 126/66 (86) 98   


 


10/8/20 11:42  62 24     65


 


10/8/20 11:10      Mechanical Ventilator  





      Mechanical Ventilator  





      Mechanical Ventilator  


 


10/8/20 11:00  56 24 126/66 (86) 97   


 


10/8/20 10:00  53 21 125/55 (78) 97   


 


10/8/20 09:00  56 22 116/47 (70) 95   


 


10/8/20 08:00 98.7 52 25 125/54 (77) 97   


 


10/8/20 08:00  59      


 


10/8/20 08:00      Mechanical Ventilator  





      Mechanical Ventilator  





      Mechanical Ventilator  


 


10/8/20 08:00        65


 


10/8/20 07:44  63 24     65


 


10/8/20 07:00  56 21 127/43 (71) 99   


 


10/8/20 06:00  53 20 136/55 (82) 90   


 


10/8/20 05:00  53 21 129/53 (78) 96   


 


10/8/20 04:00        30


 


10/8/20 04:00  52      


 


10/8/20 04:00      Mechanical Ventilator  





      Mechanical Ventilator  





      Mechanical Ventilator  


 


10/8/20 04:00 98.5 50 21 128/66 (86) 95   


 


10/8/20 03:10  53 24     65


 


10/8/20 03:00  50 22 133/55 (81) 97   


 


10/8/20 02:00  53 25 139/62 (87) 97   


 


10/8/20 01:00  53 25 130/57 (81) 96   


 


10/8/20 00:00      Mechanical Ventilator  





      Mechanical Ventilator  





      Mechanical Ventilator  


 


10/8/20 00:00  54      


 


10/8/20 00:00        30


 


10/8/20 00:00 98.5 59 24 127/53 (77) 99   


 


10/7/20 23:00  55 24 130/56 (80) 97   


 


10/7/20 23:00  59 24     65


 


10/7/20 22:00  60 18 134/58 (83) 98   


 


10/7/20 21:00  55 24 135/60 (85) 99   


 


10/7/20 20:00        30


 


10/7/20 20:00      Mechanical Ventilator  





      Mechanical Ventilator  





      Mechanical Ventilator  


 


10/7/20 20:00  54      


 


10/7/20 20:00 98.3 54 24 113/50 (71) 99   

















Intake and Output  


 


 10/7/20 10/8/20





 19:00 07:00


 


Intake Total 1400 ml 1110 ml


 


Output Total 765 ml 795 ml


 


Balance 635 ml 315 ml


 


  


 


Free Water  160 ml


 


IV Total 750 ml 600 ml


 


Tube Feeding 650 ml 250 ml


 


Other  100 ml


 


Output Urine Total 760 ml 750 ml


 


Chest Tube Drainage Total 5 ml 45 ml


 


# Voids 60 


 


# Bowel Movements 4 3











Laboratory Tests








Test


 10/8/20


04:00


 


White Blood Count


 6.8 K/UL


(4.8-10.8)


 


Red Blood Count


 2.46 M/UL


(4.20-5.40)  L


 


Hemoglobin


 7.7 G/DL


(12.0-16.0)  L


 


Hematocrit


 23.6 %


(37.0-47.0)  L


 


Mean Corpuscular Volume 96 FL (80-99)  


 


Mean Corpuscular Hemoglobin


 31.4 PG


(27.0-31.0)  H


 


Mean Corpuscular Hemoglobin


Concent 32.7 G/DL


(32.0-36.0)


 


Red Cell Distribution Width


 17.8 %


(11.6-14.8)  H


 


Platelet Count


 70 K/UL


(150-450)  L


 


Mean Platelet Volume


 7.6 FL


(6.5-10.1)


 


Neutrophils (%) (Auto)


 % (45.0-75.0)





 


Lymphocytes (%) (Auto)


 % (20.0-45.0)





 


Monocytes (%) (Auto)  % (1.0-10.0)  


 


Eosinophils (%) (Auto)  % (0.0-3.0)  


 


Basophils (%) (Auto)  % (0.0-2.0)  


 


Prothrombin Time


 11.3 SEC


(9.30-11.50)


 


Prothromb Time International


Ratio 1.0 (0.9-1.1)  





 


Activated Partial


Thromboplast Time 28 SEC (23-33)





 


Sodium Level


 141 MMOL/L


(136-145)


 


Potassium Level


 3.6 MMOL/L


(3.5-5.1)


 


Chloride Level


 110 MMOL/L


()  H


 


Carbon Dioxide Level


 25 MMOL/L


(21-32)


 


Anion Gap


 6 mmol/L


(5-15)


 


Blood Urea Nitrogen


 14 mg/dL


(7-18)


 


Creatinine


 0.6 MG/DL


(0.55-1.30)


 


Estimat Glomerular Filtration


Rate > 60 mL/min


(>60)


 


Glucose Level


 158 MG/DL


()  H


 


Calcium Level


 7.1 MG/DL


(8.5-10.1)  L


 


Total Bilirubin


 0.7 MG/DL


(0.2-1.0)


 


Aspartate Amino Transf


(AST/SGOT) 22 U/L (15-37)





 


Alanine Aminotransferase


(ALT/SGPT) 34 U/L (12-78)





 


Alkaline Phosphatase


 69 U/L


()


 


Total Protein


 4.5 G/DL


(6.4-8.2)  L


 


Albumin


 1.2 G/DL


(3.4-5.0)  L


 


Globulin 3.3 g/dL  


 


Albumin/Globulin Ratio


 0.4 (1.0-2.7)


L

















Constantine Jorgensen MD           Oct 8, 2020 19:30

## 2020-10-08 NOTE — PRE-PROCEDURE NOTE/ATTESTATION
Pre-Procedure Note/Attestation


Complete Prior to Procedure


Procedure Narrative:


tracheostomy





Indications for Procedure


Pre-Operative Diagnosis:


respirator insufficiency





Attestation


I attest that I discussed the nature of the procedure; its benefits; risks and 

complications; and alternatives (and the risks and benefits of such 

alternatives), prior to the procedure, with the patient (or the patient's legal 

representative).





I attest that, if there was a reasonable possibility of needing a blood 

transfusion, the patient (or the patient's legal representative) was given the 

[facility] standardized written summary, pursuant 

to the Danilo Jono Blood Safety Act ([place] Health and Safety Code # 1645, as 

amended).





I attest that I re-evaluated the patient just prior to the surgery and that 

there has been no change in the patient's H&P, except as documented below:











Jake Aranda                 Oct 8, 2020 12:36

## 2020-10-08 NOTE — GENERAL PROGRESS NOTE
Subjective


ROS Limited/Unobtainable:  No


Allergies:  


Coded Allergies:  


     No Known Allergies (Unverified , 1/8/19)





Objective





Last 24 Hour Vital Signs








  Date Time  Temp Pulse Resp B/P (MAP) Pulse Ox O2 Delivery O2 Flow Rate FiO2


 


10/8/20 12:00        65


 


10/8/20 11:50 98.5 65 24 126/66 (86) 98   


 


10/8/20 11:42  62 24     65


 


10/8/20 11:10      Mechanical Ventilator  





      Mechanical Ventilator  





      Mechanical Ventilator  


 


10/8/20 11:00  56 24 126/66 (86) 97   


 


10/8/20 10:00  53 21 125/55 (78) 97   


 


10/8/20 09:00  56 22 116/47 (70) 95   


 


10/8/20 08:00 98.7 52 25 125/54 (77) 97   


 


10/8/20 08:00  59      


 


10/8/20 08:00      Mechanical Ventilator  





      Mechanical Ventilator  





      Mechanical Ventilator  


 


10/8/20 08:00        65


 


10/8/20 07:44  63 24     65


 


10/8/20 07:00  56 21 127/43 (71) 99   


 


10/8/20 06:00  53 20 136/55 (82) 90   


 


10/8/20 05:00  53 21 129/53 (78) 96   


 


10/8/20 04:00        30


 


10/8/20 04:00  52      


 


10/8/20 04:00      Mechanical Ventilator  





      Mechanical Ventilator  





      Mechanical Ventilator  


 


10/8/20 04:00 98.5 50 21 128/66 (86) 95   


 


10/8/20 03:10  53 24     65


 


10/8/20 03:00  50 22 133/55 (81) 97   


 


10/8/20 02:00  53 25 139/62 (87) 97   


 


10/8/20 01:00  53 25 130/57 (81) 96   


 


10/8/20 00:00      Mechanical Ventilator  





      Mechanical Ventilator  





      Mechanical Ventilator  


 


10/8/20 00:00  54      


 


10/8/20 00:00        30


 


10/8/20 00:00 98.5 59 24 127/53 (77) 99   


 


10/7/20 23:00  55 24 130/56 (80) 97   


 


10/7/20 23:00  59 24     65


 


10/7/20 22:00  60 18 134/58 (83) 98   


 


10/7/20 21:00  55 24 135/60 (85) 99   


 


10/7/20 20:00        30


 


10/7/20 20:00      Mechanical Ventilator  





      Mechanical Ventilator  





      Mechanical Ventilator  


 


10/7/20 20:00  54      


 


10/7/20 20:00 98.3 54 24 113/50 (71) 99   


 


10/7/20 19:24  52 24     50


 


10/7/20 19:24        65


 


10/7/20 19:00  52 24 108/45 (66) 95   


 


10/7/20 18:00   24 108/45 (66) 91   


 


10/7/20 17:00  63 27 117/53 (74) 95   


 


10/7/20 16:00 98.5 61 22 116/53 (74) 92   


 


10/7/20 16:00        50


 


10/7/20 16:00  61      


 


10/7/20 16:00      Mechanical Ventilator  





      Mechanical Ventilator  





      Mechanical Ventilator  


 


10/7/20 15:00  56 28 94/46 (62) 94   


 


10/7/20 14:59  66 24     50


 


10/7/20 14:00  57 32 105/47 (66) 95   


 


10/7/20 13:00  55 25 104/45 (64) 95   

















Intake and Output  


 


 10/7/20 10/8/20





 19:00 07:00


 


Intake Total 1400 ml 1110 ml


 


Output Total 765 ml 795 ml


 


Balance 635 ml 315 ml


 


  


 


Free Water  160 ml


 


IV Total 750 ml 600 ml


 


Tube Feeding 650 ml 250 ml


 


Other  100 ml


 


Output Urine Total 760 ml 750 ml


 


Chest Tube Drainage Total 5 ml 45 ml


 


# Voids 60 


 


# Bowel Movements 4 3








Laboratory Tests


10/8/20 04:00: 


White Blood Count 6.8, Red Blood Count 2.46L, Hemoglobin 7.7L, Hematocrit 23.6L,

Mean Corpuscular Volume 96, Mean Corpuscular Hemoglobin 31.4H, Mean Corpuscular 

Hemoglobin Concent 32.7, Red Cell Distribution Width 17.8H, Platelet Count 70L, 

Mean Platelet Volume 7.6, Neutrophils (%) (Auto) , Lymphocytes (%) (Auto) , 

Monocytes (%) (Auto) , Eosinophils (%) (Auto) , Basophils (%) (Auto) , 

Prothrombin Time 11.3, Prothromb Time International Ratio 1.0, Activated Partial

Thromboplast Time 28, Sodium Level 141, Potassium Level 3.6, Chloride Level 110H

, Carbon Dioxide Level 25, Anion Gap 6, Blood Urea Nitrogen 14, Creatinine 0.6, 

Estimat Glomerular Filtration Rate > 60, Glucose Level 158H, Calcium Level 7.1L,

Total Bilirubin 0.7, Aspartate Amino Transf (AST/SGOT) 22, Alanine 

Aminotransferase (ALT/SGPT) 34, Alkaline Phosphatase 69, Total Protein 4.5L, 

Albumin 1.2L, Globulin 3.3, Albumin/Globulin Ratio 0.4L


Height (Feet):  5


Height (Inches):  3.00


Weight (Pounds):  173


General Appearance:  no apparent distress


EENT:  normal ENT inspection


Neck:  supple


Cardiovascular:  normal rate


Respiratory/Chest:  decreased breath sounds


Abdomen:  normal bowel sounds, non tender, soft


Extremities:  non-tender





Assessment/Plan


Status:  stable, not improved, unchanged


Assessment/Plan:


1. History of Down syndrome.


2. Dysphagia with G-tube.


3. Seizure disorder.


4. Hypothyroidism.


5. DAVID.


6. Pneumonia.


7. Sepsis.








fu H&H


prn blood transfusion to keep HGB above 7


ppi


GTF


hold GI procedures for now


pending possible Trach











Ted Nagy MD              Oct 8, 2020 12:26

## 2020-10-08 NOTE — NUR
NURSE NOTES:



Clenaed patient for 1 small amount of brown loose BM. Patient was transferred to OR for 
tracheostomy.

## 2020-10-08 NOTE — NUR
NURSE NOTES:

Partial bed bath provided. Patient remains free from injury. Axillary temp 98.5F. Accucheck 
159; 2 units of insulin given. D/C feeding; patient NPO after midnight for pending 
procedure. Signed consent noted; consent in chart.

## 2020-10-09 VITALS — DIASTOLIC BLOOD PRESSURE: 50 MMHG | SYSTOLIC BLOOD PRESSURE: 96 MMHG

## 2020-10-09 VITALS — SYSTOLIC BLOOD PRESSURE: 93 MMHG | DIASTOLIC BLOOD PRESSURE: 46 MMHG

## 2020-10-09 VITALS — DIASTOLIC BLOOD PRESSURE: 48 MMHG | SYSTOLIC BLOOD PRESSURE: 87 MMHG

## 2020-10-09 VITALS — SYSTOLIC BLOOD PRESSURE: 93 MMHG | DIASTOLIC BLOOD PRESSURE: 53 MMHG

## 2020-10-09 VITALS — SYSTOLIC BLOOD PRESSURE: 100 MMHG | DIASTOLIC BLOOD PRESSURE: 60 MMHG

## 2020-10-09 VITALS — SYSTOLIC BLOOD PRESSURE: 120 MMHG | DIASTOLIC BLOOD PRESSURE: 90 MMHG

## 2020-10-09 VITALS — SYSTOLIC BLOOD PRESSURE: 106 MMHG | DIASTOLIC BLOOD PRESSURE: 42 MMHG

## 2020-10-09 VITALS — DIASTOLIC BLOOD PRESSURE: 49 MMHG | SYSTOLIC BLOOD PRESSURE: 93 MMHG

## 2020-10-09 VITALS — DIASTOLIC BLOOD PRESSURE: 67 MMHG | SYSTOLIC BLOOD PRESSURE: 98 MMHG

## 2020-10-09 VITALS — SYSTOLIC BLOOD PRESSURE: 96 MMHG | DIASTOLIC BLOOD PRESSURE: 56 MMHG

## 2020-10-09 VITALS — SYSTOLIC BLOOD PRESSURE: 108 MMHG | DIASTOLIC BLOOD PRESSURE: 83 MMHG

## 2020-10-09 VITALS — SYSTOLIC BLOOD PRESSURE: 97 MMHG | DIASTOLIC BLOOD PRESSURE: 56 MMHG

## 2020-10-09 VITALS — SYSTOLIC BLOOD PRESSURE: 102 MMHG | DIASTOLIC BLOOD PRESSURE: 65 MMHG

## 2020-10-09 VITALS — DIASTOLIC BLOOD PRESSURE: 56 MMHG | SYSTOLIC BLOOD PRESSURE: 95 MMHG

## 2020-10-09 VITALS — DIASTOLIC BLOOD PRESSURE: 66 MMHG | SYSTOLIC BLOOD PRESSURE: 105 MMHG

## 2020-10-09 VITALS — DIASTOLIC BLOOD PRESSURE: 51 MMHG | SYSTOLIC BLOOD PRESSURE: 96 MMHG

## 2020-10-09 VITALS — SYSTOLIC BLOOD PRESSURE: 129 MMHG | DIASTOLIC BLOOD PRESSURE: 78 MMHG

## 2020-10-09 VITALS — DIASTOLIC BLOOD PRESSURE: 42 MMHG | SYSTOLIC BLOOD PRESSURE: 105 MMHG

## 2020-10-09 VITALS — SYSTOLIC BLOOD PRESSURE: 95 MMHG | DIASTOLIC BLOOD PRESSURE: 69 MMHG

## 2020-10-09 VITALS — DIASTOLIC BLOOD PRESSURE: 58 MMHG | SYSTOLIC BLOOD PRESSURE: 110 MMHG

## 2020-10-09 VITALS — DIASTOLIC BLOOD PRESSURE: 54 MMHG | SYSTOLIC BLOOD PRESSURE: 116 MMHG

## 2020-10-09 VITALS — SYSTOLIC BLOOD PRESSURE: 93 MMHG | DIASTOLIC BLOOD PRESSURE: 58 MMHG

## 2020-10-09 VITALS — SYSTOLIC BLOOD PRESSURE: 129 MMHG | DIASTOLIC BLOOD PRESSURE: 87 MMHG

## 2020-10-09 VITALS — SYSTOLIC BLOOD PRESSURE: 99 MMHG | DIASTOLIC BLOOD PRESSURE: 48 MMHG

## 2020-10-09 LAB
ADD MANUAL DIFF: NO
ALBUMIN SERPL-MCNC: 1.2 G/DL (ref 3.4–5)
ALBUMIN/GLOB SERPL: 0.3 {RATIO} (ref 1–2.7)
ALP SERPL-CCNC: 77 U/L (ref 46–116)
ALT SERPL-CCNC: 36 U/L (ref 12–78)
ANION GAP SERPL CALC-SCNC: 9 MMOL/L (ref 5–15)
AST SERPL-CCNC: 31 U/L (ref 15–37)
BILIRUB SERPL-MCNC: 0.7 MG/DL (ref 0.2–1)
BUN SERPL-MCNC: 13 MG/DL (ref 7–18)
CALCIUM SERPL-MCNC: 7.1 MG/DL (ref 8.5–10.1)
CHLORIDE SERPL-SCNC: 109 MMOL/L (ref 98–107)
CO2 SERPL-SCNC: 22 MMOL/L (ref 21–32)
CREAT SERPL-MCNC: 0.6 MG/DL (ref 0.55–1.3)
ERYTHROCYTE [DISTWIDTH] IN BLOOD BY AUTOMATED COUNT: 17.6 % (ref 11.6–14.8)
GLOBULIN SER-MCNC: 3.9 G/DL
HCT VFR BLD CALC: 23.8 % (ref 37–47)
HGB BLD-MCNC: 7.9 G/DL (ref 12–16)
MCV RBC AUTO: 95 FL (ref 80–99)
PHOSPHATE SERPL-MCNC: 2.7 MG/DL (ref 2.5–4.9)
PLATELET # BLD: 66 K/UL (ref 150–450)
POTASSIUM SERPL-SCNC: 3.9 MMOL/L (ref 3.5–5.1)
RBC # BLD AUTO: 2.5 M/UL (ref 4.2–5.4)
SODIUM SERPL-SCNC: 140 MMOL/L (ref 136–145)
WBC # BLD AUTO: 8 K/UL (ref 4.8–10.8)

## 2020-10-09 RX ADMIN — PANTOPRAZOLE SODIUM SCH MG: 40 INJECTION, POWDER, FOR SOLUTION INTRAVENOUS at 08:11

## 2020-10-09 RX ADMIN — HYDROCORTISONE SODIUM SUCCINATE SCH MG: 100 INJECTION, POWDER, FOR SOLUTION INTRAMUSCULAR; INTRAVENOUS at 22:00

## 2020-10-09 RX ADMIN — HYDROCORTISONE SODIUM SUCCINATE SCH MG: 100 INJECTION, POWDER, FOR SOLUTION INTRAMUSCULAR; INTRAVENOUS at 20:42

## 2020-10-09 RX ADMIN — INSULIN ASPART SCH UNITS: 100 INJECTION, SOLUTION INTRAVENOUS; SUBCUTANEOUS at 17:28

## 2020-10-09 RX ADMIN — INSULIN ASPART SCH UNITS: 100 INJECTION, SOLUTION INTRAVENOUS; SUBCUTANEOUS at 06:00

## 2020-10-09 RX ADMIN — PANTOPRAZOLE SODIUM SCH MG: 40 INJECTION, POWDER, FOR SOLUTION INTRAVENOUS at 20:41

## 2020-10-09 RX ADMIN — INSULIN ASPART SCH UNITS: 100 INJECTION, SOLUTION INTRAVENOUS; SUBCUTANEOUS at 00:00

## 2020-10-09 RX ADMIN — HYDROCORTISONE SODIUM SUCCINATE SCH MG: 100 INJECTION, POWDER, FOR SOLUTION INTRAMUSCULAR; INTRAVENOUS at 08:13

## 2020-10-09 RX ADMIN — INSULIN ASPART SCH UNITS: 100 INJECTION, SOLUTION INTRAVENOUS; SUBCUTANEOUS at 11:43

## 2020-10-09 RX ADMIN — INSULIN DETEMIR SCH UNITS: 100 INJECTION, SOLUTION SUBCUTANEOUS at 20:43

## 2020-10-09 RX ADMIN — INSULIN DETEMIR SCH UNITS: 100 INJECTION, SOLUTION SUBCUTANEOUS at 08:15

## 2020-10-09 RX ADMIN — CHLORHEXIDINE GLUCONATE SCH APPLIC: 213 SOLUTION TOPICAL at 20:41

## 2020-10-09 NOTE — INTERNAL MED PROGRESS NOTE
Subjective


Physician Name


Tyson Hung


Attending Physician


Chano Cherry MD





Current Medications








 Medications


  (Trade)  Dose


 Ordered  Sig/Didi


 Route


 PRN Reason  Start Time


 Stop Time Status Last Admin


Dose Admin


 


 Acetaminophen


  (Tylenol)  650 mg  Q4H  PRN


 GT


 For Pain  9/23/20 17:15


 10/23/20 17:14  10/2/20 17:46





 


 Chlorhexidine


 Gluconate


  (Beatrice-Hex 2%)  1 applic  DAILY@2000


 TOPIC


   8/31/20 20:00


 11/29/20 19:59  10/8/20 21:11





 


 Clotrimazole


  (Lotrimin)  1 applic  Q12HR


 TOPIC


   8/30/20 13:00


 11/28/20 12:59  10/9/20 08:14





 


 Dextrose


  (Dextrose 50%)  25 ml  Q30M  PRN


 IV


 Hypoglycemia  8/26/20 11:30


 11/20/20 11:29   





 


 Dextrose


  (Dextrose 50%)  50 ml  Q30M  PRN


 IV


 Hypoglycemia  8/26/20 11:30


 11/20/20 11:29   





 


 Hydrocortisone


  (Solu-CORTEF)  50 mg  EVERY 12  HOURS


 IV


   10/2/20 21:00


 12/27/20 20:59  10/9/20 08:13





 


 Insulin Aspart


  (NovoLOG)    Q6HR


 SUBQ


   9/18/20 12:00


 12/17/20 11:59  10/7/20 23:56





 


 Insulin Detemir


  (Levemir)  10 units  Q12HR


 SUBQ


   9/18/20 10:00


 12/17/20 09:59  10/9/20 08:15





 


 Levothyroxine


 Sodium


  (Synthroid)  75 mcg  DAILY@0630


 GT


   9/30/20 06:30


 10/30/20 06:29  10/9/20 05:45





 


 Loperamide HCl


  (Imodium)  2 mg  Q6H  PRN


 NG


 Diarrhea  9/16/20 10:30


 10/16/20 10:29  9/23/20 11:41





 


 Lorazepam


  (Ativan 2mg/ml


 1ml)  2 mg  Q4H  PRN


 IV


 For Anxiety  10/5/20 00:45


 10/12/20 00:44  10/5/20 03:06





 


 Pantoprazole


  (Protonix)  40 mg  EVERY 12  HOURS


 IVP


   9/28/20 21:00


 10/28/20 20:59  10/9/20 08:11





 


 Potassium Chloride


  (K-Dur)  40 meq  EVERY 12  HOURS


 GT


   9/26/20 21:00


 12/19/20 12:14  10/9/20 08:11





 


 Sodium Chloride  1,000 ml @ 


 50 mls/hr  Q20H


 IV


   10/4/20 11:00


 11/3/20 10:59  10/9/20 08:22











Allergies:  


Coded Allergies:  


     No Known Allergies (Unverified , 1/8/19)


Subjective


in ICU,on the vent, status post tracheostomy, awake, responsive, open her eyes, 

WBC 8.0, Hemoglobin 7.9.





Objective





Last Vital Signs








  Date Time  Temp Pulse Resp B/P (MAP) Pulse Ox O2 Delivery O2 Flow Rate FiO2


 


10/9/20 15:00  46 27 129/78 (95) 98   


 


10/9/20 12:00 98.3       


 


10/9/20 12:00        50


 


10/9/20 12:00      Mechanical Ventilator  





      Mechanical Ventilator  





      Mechanical Ventilator  











Laboratory Tests








Test


 10/8/20


23:51 10/9/20


00:11 10/9/20


04:50


 


POC Whole Blood Glucose


 Pending  


 94 MG/DL


() 





 


White Blood Count


 


 


 8.0 K/UL


(4.8-10.8)


 


Red Blood Count


 


 


 2.50 M/UL


(4.20-5.40)  L


 


Hemoglobin


 


 


 7.9 G/DL


(12.0-16.0)  L


 


Hematocrit


 


 


 23.8 %


(37.0-47.0)  L


 


Mean Corpuscular Volume   95 FL (80-99)  


 


Mean Corpuscular Hemoglobin


 


 


 31.6 PG


(27.0-31.0)  H


 


Mean Corpuscular Hemoglobin


Concent 


 


 33.2 G/DL


(32.0-36.0)


 


Red Cell Distribution Width


 


 


 17.6 %


(11.6-14.8)  H


 


Platelet Count


 


 


 66 K/UL


(150-450)  L


 


Mean Platelet Volume


 


 


 9.0 FL


(6.5-10.1)


 


Neutrophils (%) (Auto)


 


 


 % (45.0-75.0)





 


Lymphocytes (%) (Auto)


 


 


 % (20.0-45.0)





 


Monocytes (%) (Auto)    % (1.0-10.0)  


 


Eosinophils (%) (Auto)    % (0.0-3.0)  


 


Basophils (%) (Auto)    % (0.0-2.0)  


 


Sodium Level


 


 


 140 MMOL/L


(136-145)


 


Potassium Level


 


 


 3.9 MMOL/L


(3.5-5.1)


 


Chloride Level


 


 


 109 MMOL/L


()  H


 


Carbon Dioxide Level


 


 


 22 MMOL/L


(21-32)


 


Anion Gap


 


 


 9 mmol/L


(5-15)


 


Blood Urea Nitrogen


 


 


 13 mg/dL


(7-18)


 


Creatinine


 


 


 0.6 MG/DL


(0.55-1.30)


 


Estimat Glomerular Filtration


Rate 


 


 > 60 mL/min


(>60)


 


Glucose Level


 


 


 112 MG/DL


()  H


 


Calcium Level


 


 


 7.1 MG/DL


(8.5-10.1)  L


 


Phosphorus Level


 


 


 2.7 MG/DL


(2.5-4.9)


 


Magnesium Level


 


 


 2.2 MG/DL


(1.8-2.4)


 


Total Bilirubin


 


 


 0.7 MG/DL


(0.2-1.0)


 


Aspartate Amino Transf


(AST/SGOT) 


 


 31 U/L (15-37)





 


Alanine Aminotransferase


(ALT/SGPT) 


 


 36 U/L (12-78)





 


Alkaline Phosphatase


 


 


 77 U/L


()


 


Total Protein


 


 


 5.1 G/DL


(6.4-8.2)  L


 


Albumin


 


 


 1.2 G/DL


(3.4-5.0)  L


 


Globulin   3.9 g/dL  


 


Albumin/Globulin Ratio


 


 


 0.3 (1.0-2.7)


L

















Intake and Output  


 


 10/8/20 10/9/20





 19:00 07:00


 


Intake Total 630 ml 1020 ml


 


Output Total 530 ml 745 ml


 


Balance 100 ml 275 ml


 


  


 


Free Water  120 ml


 


IV Total 600 ml 650 ml


 


Tube Feeding 30 ml 250 ml


 


Output Urine Total 485 ml 620 ml


 


Chest Tube Drainage Total 45 ml 125 ml


 


# Bowel Movements 4 1








Objective


General: awake, responsive, open eyes


HEENT: NCAT, sclera anicteric, PERRL, EOMI


Neck: Supple, tracheostomy site intact


Lungs: mechanical breath sounds decreased air at the bases,  no Wheeze, no 

rhonchi.


CHEST WALL: left side chest tubes.


Heart: Regular rate and rhythm, normal S1/S2, no murmurs/gallops


Abdomen: soft, not tender, not distended. + bowel sounds, Morbid Obesity, PEG 

site intact.


: Valle cath


Extremities: No Cyanosis , clubbing,  Bilateral upper extremities less edema. 

left upper extremity PICC line.


Neuro: limited secondary to patient status, unable to follow command, bilateral 

upper and lower extremities contracted.





Assessment/Plan


Assessment/Plan


Problem List:  


(1) HCAP (healthcare-associated pneumonia)


(2) Sepsis


(3) Down's syndrome


(4) Dysphagia S/P PEG


(5) Seizure disorder


(6) Hypothyroidism


(7) Acute Hypoxemic respiratory failure


(8) Persistent, high grade bacteremia-  r/o endocarditis


(9) DAVID


(10) Bilateral  pneumothorax status post chest tube placement.





Plan: 


Tolerated tube feeding @ 50 cc/hr


Follow-up with laboratory and cultures


decrease Hydrocortisone 25 mg IV every 12


Continue to monitor off abx 


transfer out of ICU to [facility]











Tyson Hung MD                  Oct 9, 2020 16:00

## 2020-10-09 NOTE — GENERAL PROGRESS NOTE
Subjective


ROS Limited/Unobtainable:  No


Allergies:  


Coded Allergies:  


     No Known Allergies (Unverified , 1/8/19)





Objective





Last 24 Hour Vital Signs








  Date Time  Temp Pulse Resp B/P (MAP) Pulse Ox O2 Delivery O2 Flow Rate FiO2


 


10/9/20 10:00  48 23 93/58 (70) 97   


 


10/9/20 09:00  51 27 100/60 (73) 97   


 


10/9/20 08:00      Mechanical Ventilator  





      Mechanical Ventilator  





      Mechanical Ventilator  


 


10/9/20 08:00  47      


 


10/9/20 08:00 98.6 44 22 98/67 (77) 98   


 


10/9/20 08:00        50


 


10/9/20 07:05  60 24     50


 


10/9/20 07:00  45 21 95/56 (69) 98   


 


10/9/20 06:00  49 29 97/56 (70) 98   


 


10/9/20 05:00  45 29 106/42 (63) 98   


 


10/9/20 04:00 97.7 57 22 120/90 (100) 97   


 


10/9/20 04:00  51      


 


10/9/20 04:00      Mechanical Ventilator  





      Mechanical Ventilator  





      Mechanical Ventilator  


 


10/9/20 04:00        50


 


10/9/20 03:33  49 24     50


 


10/9/20 03:00  56 25 99/48 (65) 98   


 


10/9/20 02:00  49 28 105/42 (63) 99   


 


10/9/20 01:00  46 33 108/83 (91) 100   


 


10/9/20 00:00  46      


 


10/9/20 00:00      Mechanical Ventilator  





      Mechanical Ventilator  





      Mechanical Ventilator  


 


10/9/20 00:00 97.7 47 24 129/87 (101) 100   


 


10/8/20 23:58  48 24     50


 


10/8/20 23:00  44 29 92/54 (67) 100   


 


10/8/20 22:00  45 24 97/64 (75) 100   


 


10/8/20 21:00  45 26 104/57 (73) 100   


 


10/8/20 20:00      Mechanical Ventilator  





      Mechanical Ventilator  





      Mechanical Ventilator  


 


10/8/20 20:00        65


 


10/8/20 20:00  47      


 


10/8/20 20:00 97.6 47 23 110/58 (75) 100   


 


10/8/20 19:44  49 24     65


 


10/8/20 19:00  47 27 97/55 (69) 100   


 


10/8/20 18:00  54 18 119/76 (90) 100   


 


10/8/20 17:00 98.6 48 17 96/72 (80) 100   


 


10/8/20 16:00  47      


 


10/8/20 16:00  47 24 105/61 (76) 100   


 


10/8/20 16:00      Mechanical Ventilator  





      Mechanical Ventilator  





      Mechanical Ventilator  


 


10/8/20 15:29  52 24     65


 


10/8/20 15:00  52 22 106/54 (71) 100   


 


10/8/20 14:00  55 22 94/53 (67) 95   


 


10/8/20 13:45  56 22 91/41 (58) 95   


 


10/8/20 13:30  57 30 98/53 (68) 94   


 


10/8/20 13:15  62 20 88/56 (67) 96   


 


10/8/20 13:00  62 20 88/56 (67) 96   


 


10/8/20 13:00  59 31 84/43 (57) 97   


 


10/8/20 12:50  64 16  99   


 


10/8/20 12:45        65


 


10/8/20 12:45  58  83/40 (54)    


 


10/8/20 12:45      Mechanical Ventilator  





      Mechanical Ventilator  





      Mechanical Ventilator  


 


10/8/20 12:00        65


 


10/8/20 12:00  56      


 


10/8/20 11:50 98.5 65 24 126/66 (86) 98   


 


10/8/20 11:42  62 24     65

















Intake and Output  


 


 10/8/20 10/9/20





 19:00 07:00


 


Intake Total 630 ml 1020 ml


 


Output Total 530 ml 745 ml


 


Balance 100 ml 275 ml


 


  


 


Free Water  120 ml


 


IV Total 600 ml 650 ml


 


Tube Feeding 30 ml 250 ml


 


Output Urine Total 485 ml 620 ml


 


Chest Tube Drainage Total 45 ml 125 ml


 


# Bowel Movements 4 1








Laboratory Tests


10/8/20 23:51: POC Whole Blood Glucose [Pending]


10/9/20 00:11: POC Whole Blood Glucose 94


10/9/20 04:50: 


White Blood Count 8.0, Red Blood Count 2.50L, Hemoglobin 7.9L, Hematocrit 23.8L,

 Mean Corpuscular Volume 95, Mean Corpuscular Hemoglobin 31.6H, Mean Corpuscular

 Hemoglobin Concent 33.2, Red Cell Distribution Width 17.6H, Platelet Count 66L,

 Mean Platelet Volume 9.0, Neutrophils (%) (Auto) , Lymphocytes (%) (Auto) , 

Monocytes (%) (Auto) , Eosinophils (%) (Auto) , Basophils (%) (Auto) , Sodium 

Level 140, Potassium Level 3.9, Chloride Level 109H, Carbon Dioxide Level 22, 

Anion Gap 9, Blood Urea Nitrogen 13, Creatinine 0.6, Estimat Glomerular 

Filtration Rate > 60, Glucose Level 112H, Calcium Level 7.1L, Phosphorus Level 

2.7, Magnesium Level 2.2, Total Bilirubin 0.7, Aspartate Amino Transf (AST/SGOT)

 31, Alanine Aminotransferase (ALT/SGPT) 36, Alkaline Phosphatase 77, Total 

Protein 5.1L, Albumin 1.2L, Globulin 3.9, Albumin/Globulin Ratio 0.3L


Height (Feet):  5


Height (Inches):  3.00


Weight (Pounds):  173


General Appearance:  lethargic


EENT:  normal ENT inspection


Neck:  supple


Cardiovascular:  normal rate


Respiratory/Chest:  decreased breath sounds


Abdomen:  soft, hypoactive bowel sounds


Extremities:  non-tender





Assessment/Plan


Status:  stable, not improved, unchanged


Assessment/Plan:


1. History of Down syndrome.


2. Dysphagia with G-tube.


3. Seizure disorder.


4. Hypothyroidism.


5. DAVID.


6. Pneumonia.


7. Sepsis.








fu H&H


prn blood transfusion to keep HGB above 7


ppi


GTF


hold GI procedures for now











Ted Nagy MD              Oct 9, 2020 11:29

## 2020-10-09 NOTE — NUR
NURSE NOTES: Report received from LAYTON Lopez. Pt lying comfortably in semi-fowlers with no 
signs of acute distress noted. A+Ox1, opens eyes to voice and tracks. Pt nonverbal and does 
not follow commands. Pt SB on the monitor @ 49. Respirations even and unlabored on  
trach/vent with shiley 8 and Vent settings AC 24 , 50% fio2. G-tube noted running 
feeding @ 40 ml/hr. Left upper arm picc running fluids @ prescribed rate.  Patient has 
sacral and left foot wound dressed in optifoam, clean and dry. Thoravent on continuous 
suction, draining serous fluid. All other vitals stable as documented. Bed at lowest 
position, brakes engaged, siderails x3, bed alarm on, and call light within reach. Pt at 
stable condition at this time, will continue to monitor.

## 2020-10-09 NOTE — NUR
Nurse Notes: 

patient asleep arousable to light shaking but nonverbal with Shiley 8 trach on ventilator AC 
24  FiO2 65%. /87 HR47 Sinus Jose Daniel on monitor and afebrile. Left upper arm 
running 1/2NS @50ml/hr asymptomatic. thoravent on left chest set to low continuous suction.  
gtube running vital AF @20ml/hr. blood sugar 75, 120ml juice given per protocol, blood sugar 
94 after 15min. Valle catheter draining yellow urine. patient repositioned and oral care 
provided.

## 2020-10-09 NOTE — NUR
NURSE NOTES:

Received patient from LAYTON Lopez under the care of Dr. Cherry for the admitting dx. of 
PNA, R/O Covid. Patient has no allergies noted. On fall, aspiration, and seizure precautions 
observed and maintained at all times. Patient has Down syndrome and is oriented to self and 
responds to her name. Patient is noted with trach size shiley 8 with settings of AC-24 
TV-500 FiO2-45%. Noted with GT feeding Vital AF at 50cc tolerated well. Patient has 
thoravent on L chest attached to low continuous suction. Patient has bilateral soft wrist 
restrains. Will continue with current plan of care.

## 2020-10-09 NOTE — NUR
NURSE HAND-OFF REPORT: 



Latest Vital Signs: Temperature 97.7 , Pulse 49 , B/P 97 /56 , Respiratory Rate 29 , O2 SAT 
98 , Mechanical Ventilator, O2 Flow Rate 15.0 .  

Vital Sign Comment: Joslyn UP aware. 



EKG Rhythm: Sinus Bradycardia

Rhythm change?: N 

MD Notified?: RENU Carver MD Response: No New Orders Received



Latest Momin Fall Score: 70  

Fall Risk: High Risk 

Safety Measures: Call light Within Reach, Bed Alarm Zone 1, Side Rails Side Rails x3, Bed 
position Low and Locked.

Fall Precautions: 

Yellow Socks



Report given to LAYTON Valencia.

## 2020-10-09 NOTE — PULMONOLGY CRITICAL CARE NOTE
Critical Care - Asmt/Plan


Problems:  


(1) Acute respiratory failure


(2) Pneumothorax


Assessment & Plan:  resolved, left Chest tube still in. 





(3) Bacteremia


(4) Pneumonia


(5) Sepsis


(6) HCAP (healthcare-associated pneumonia)


(7) Seizure disorder


(8) Down's syndrome


(9) Trisomy 21, Down syndrome


Respiratory:  monitor respiratory rate, adjust FIO2, CXR


Cardiac:  continue to monitor HR/BP


Renal:  check electrolytes


Infectious Disease:  check cultures


Gastrointestinal:  continue feedings/current rate


Endocrine:  monitor blood sugar, check HgA1C


Hematologic:  monitor H/H


Neurologic:  PRN Ativan, PRN Morphine


Disposition:  keep in ICU


Notes Reviewed:  internist, cardio, renal


Discussed with:  nurses, consultants, 





Critical Care - Objective





Last 24 Hour Vital Signs








  Date Time  Temp Pulse Resp B/P (MAP) Pulse Ox O2 Delivery O2 Flow Rate FiO2


 


10/9/20 10:00  48 23 93/58 (70) 97   


 


10/9/20 09:00  51 27 100/60 (73) 97   


 


10/9/20 08:00      Mechanical Ventilator  





      Mechanical Ventilator  





      Mechanical Ventilator  


 


10/9/20 08:00  47      


 


10/9/20 08:00 98.6 44 22 98/67 (77) 98   


 


10/9/20 08:00        50


 


10/9/20 07:05  60 24     50


 


10/9/20 07:00  45 21 95/56 (69) 98   


 


10/9/20 06:00  49 29 97/56 (70) 98   


 


10/9/20 05:00  45 29 106/42 (63) 98   


 


10/9/20 04:00 97.7 57 22 120/90 (100) 97   


 


10/9/20 04:00  51      


 


10/9/20 04:00      Mechanical Ventilator  





      Mechanical Ventilator  





      Mechanical Ventilator  


 


10/9/20 04:00        50


 


10/9/20 03:33  49 24     50


 


10/9/20 03:00  56 25 99/48 (65) 98   


 


10/9/20 02:00  49 28 105/42 (63) 99   


 


10/9/20 01:00  46 33 108/83 (91) 100   


 


10/9/20 00:00  46      


 


10/9/20 00:00      Mechanical Ventilator  





      Mechanical Ventilator  





      Mechanical Ventilator  


 


10/9/20 00:00 97.7 47 24 129/87 (101) 100   


 


10/8/20 23:58  48 24     50


 


10/8/20 23:00  44 29 92/54 (67) 100   


 


10/8/20 22:00  45 24 97/64 (75) 100   


 


10/8/20 21:00  45 26 104/57 (73) 100   


 


10/8/20 20:00      Mechanical Ventilator  





      Mechanical Ventilator  





      Mechanical Ventilator  


 


10/8/20 20:00        65


 


10/8/20 20:00  47      


 


10/8/20 20:00 97.6 47 23 110/58 (75) 100   


 


10/8/20 19:44  49 24     65


 


10/8/20 19:00  47 27 97/55 (69) 100   


 


10/8/20 18:00  54 18 119/76 (90) 100   


 


10/8/20 17:00 98.6 48 17 96/72 (80) 100   


 


10/8/20 16:00  47      


 


10/8/20 16:00  47 24 105/61 (76) 100   


 


10/8/20 16:00      Mechanical Ventilator  





      Mechanical Ventilator  





      Mechanical Ventilator  


 


10/8/20 15:29  52 24     65


 


10/8/20 15:00  52 22 106/54 (71) 100   


 


10/8/20 14:00  55 22 94/53 (67) 95   


 


10/8/20 13:45  56 22 91/41 (58) 95   


 


10/8/20 13:30  57 30 98/53 (68) 94   


 


10/8/20 13:15  62 20 88/56 (67) 96   


 


10/8/20 13:00  62 20 88/56 (67) 96   


 


10/8/20 13:00  59 31 84/43 (57) 97   


 


10/8/20 12:50  64 16  99   


 


10/8/20 12:45        65


 


10/8/20 12:45  58  83/40 (54)    


 


10/8/20 12:45      Mechanical Ventilator  





      Mechanical Ventilator  





      Mechanical Ventilator  


 


10/8/20 12:00        65


 


10/8/20 12:00  56      


 


10/8/20 11:50 98.5 65 24 126/66 (86) 98   


 


10/8/20 11:42  62 24     65


 


10/8/20 11:10      Mechanical Ventilator  





      Mechanical Ventilator  





      Mechanical Ventilator  


 


10/8/20 11:00  56 24 126/66 (86) 97   








Status:  awake, sedated, somnolent


Condition:  improving


HEENT:  atraumatic, normocephalic


Neck:  trach


Lungs:  rales, rhonchi


Heart:  HR/BP stable, HR/BP unstable


Abdomen:  soft, non-tender


Extremities:  no C/C/E


Decubiti:  location


Accucheck:  106





Critical Care - Subjective


ROS Limited/Unobtainable:  Yes


Condition:  critical


EKG Rhythm:  Sinus Rhythm


FI02:  50


Vent Support Breath Rate:  24


Vent Support Mode:  AC


Vent Tidal Volume:  500


Sputum Amount:  Small


PEEP:  0.0


PIP:  33


Tube Feeding Amount:  40


I&O:











Intake and Output  


 


 10/8/20 10/9/20





 19:00 07:00


 


Intake Total 630 ml 1020 ml


 


Output Total 530 ml 745 ml


 


Balance 100 ml 275 ml


 


  


 


Free Water  120 ml


 


IV Total 600 ml 650 ml


 


Tube Feeding 30 ml 250 ml


 


Output Urine Total 485 ml 620 ml


 


Chest Tube Drainage Total 45 ml 125 ml


 


# Bowel Movements 4 1








CXR:


Left chest vent catheter is again demonstrated. No pneumothorax.


Tracheostomy, left arm PICC, bilateral infiltrates are all unchanged.


ET-Tube:  7.5


ET Position:  23


Labs:





Laboratory Tests








Test


 10/8/20


23:51 10/9/20


00:11 10/9/20


04:50


 


POC Whole Blood Glucose


 Pending  


 94 MG/DL


() 





 


White Blood Count


 


 


 8.0 K/UL


(4.8-10.8)


 


Red Blood Count


 


 


 2.50 M/UL


(4.20-5.40)  L


 


Hemoglobin


 


 


 7.9 G/DL


(12.0-16.0)  L


 


Hematocrit


 


 


 23.8 %


(37.0-47.0)  L


 


Mean Corpuscular Volume   95 FL (80-99)  


 


Mean Corpuscular Hemoglobin


 


 


 31.6 PG


(27.0-31.0)  H


 


Mean Corpuscular Hemoglobin


Concent 


 


 33.2 G/DL


(32.0-36.0)


 


Red Cell Distribution Width


 


 


 17.6 %


(11.6-14.8)  H


 


Platelet Count


 


 


 66 K/UL


(150-450)  L


 


Mean Platelet Volume


 


 


 9.0 FL


(6.5-10.1)


 


Neutrophils (%) (Auto)


 


 


 % (45.0-75.0)





 


Lymphocytes (%) (Auto)


 


 


 % (20.0-45.0)





 


Monocytes (%) (Auto)    % (1.0-10.0)  


 


Eosinophils (%) (Auto)    % (0.0-3.0)  


 


Basophils (%) (Auto)    % (0.0-2.0)  


 


Sodium Level


 


 


 140 MMOL/L


(136-145)


 


Potassium Level


 


 


 3.9 MMOL/L


(3.5-5.1)


 


Chloride Level


 


 


 109 MMOL/L


()  H


 


Carbon Dioxide Level


 


 


 22 MMOL/L


(21-32)


 


Anion Gap


 


 


 9 mmol/L


(5-15)


 


Blood Urea Nitrogen


 


 


 13 mg/dL


(7-18)


 


Creatinine


 


 


 0.6 MG/DL


(0.55-1.30)


 


Estimat Glomerular Filtration


Rate 


 


 > 60 mL/min


(>60)


 


Glucose Level


 


 


 112 MG/DL


()  H


 


Calcium Level


 


 


 7.1 MG/DL


(8.5-10.1)  L


 


Phosphorus Level


 


 


 2.7 MG/DL


(2.5-4.9)


 


Magnesium Level


 


 


 2.2 MG/DL


(1.8-2.4)


 


Total Bilirubin


 


 


 0.7 MG/DL


(0.2-1.0)


 


Aspartate Amino Transf


(AST/SGOT) 


 


 31 U/L (15-37)





 


Alanine Aminotransferase


(ALT/SGPT) 


 


 36 U/L (12-78)





 


Alkaline Phosphatase


 


 


 77 U/L


()


 


Total Protein


 


 


 5.1 G/DL


(6.4-8.2)  L


 


Albumin


 


 


 1.2 G/DL


(3.4-5.0)  L


 


Globulin   3.9 g/dL  


 


Albumin/Globulin Ratio


 


 


 0.3 (1.0-2.7)


L

















Chano Cherry MD               Oct 9, 2020 10:54

## 2020-10-09 NOTE — NUR
Nurse Notes: 

Patient asleep arousable to light shaking but nonverbal on with cuffed shiley 8 trach on 
ventilator AC 24  FiO2 45%. BP93/46 HR48 Sinus Jose Daniel on monitor and afebrile. Left 
side chest thoravent set to low continuous suction. Left upper arm PICC running 1/2NS 
@50ml/hr asymptomatic. Gtube running Vital AF @ 50ml/hr. Valle catheter draining yellow 
urine.  Patient repositioned and oral care provided.

## 2020-10-09 NOTE — NUR
Nurse Notes: 

Patient received from LAYTON Valencia. Patient asleep arousable to name but nonverbal on with 
cuffed shiley 8 trach on ventilator AC 24  FiO2 45%. BP96/51 HR50 Sinus Jose Daniel on 
monitor and afebrile. Left side chest thoravent set to low continuous suction. Left upper 
arm PICC running 1/2NS @50ml/hr asymptomatic. Gtube running Vital AF @ 50ml/hr no residual 
noted. Valle catheter draining yellow urine. Sacral and Perineal redness noted. Patient on 
p200 mattress, repositioned and oral care provided.

## 2020-10-09 NOTE — SURGERY PROGRESS NOTE
Surgery Progress Note


Subjective


Procedure Performed


Tracheostomy


Symptoms:  improved, tolerating diet





Objective





Last 24 Hour Vital Signs








  Date Time  Temp Pulse Resp B/P (MAP) Pulse Ox O2 Delivery O2 Flow Rate FiO2


 


10/9/20 20:00 97.8 52 23 87/48 (61) 96   


 


10/9/20 20:00        45


 


10/9/20 20:00      Mechanical Ventilator  





      Mechanical Ventilator  





      Mechanical Ventilator  


 


10/9/20 19:20  52 24     45


 


10/9/20 19:00  50 23 96/51 (66) 96   


 


10/9/20 18:00  48 24 93/49 (64) 95   


 


10/9/20 17:00  54 27 95/69 (78) 95   


 


10/9/20 16:00  51      


 


10/9/20 16:00        50


 


10/9/20 16:00      Mechanical Ventilator  





      Mechanical Ventilator  





      Mechanical Ventilator  


 


10/9/20 16:00 98.5 55 23 96/50 (65) 96   


 


10/9/20 15:00  43 24     45


 


10/9/20 15:00  46 27 129/78 (95) 98   


 


10/9/20 14:00  49 26 105/66 (79) 98   


 


10/9/20 13:00  49 24 116/54 (74) 98   


 


10/9/20 12:00 98.3 50 24 102/65 (77) 98   


 


10/9/20 12:00  46      


 


10/9/20 12:00        50


 


10/9/20 12:00      Mechanical Ventilator  





      Mechanical Ventilator  





      Mechanical Ventilator  


 


10/9/20 11:57  53 24     50


 


10/9/20 11:00  48 20 110/58 (75) 97   


 


10/9/20 10:00  48 23 93/58 (70) 97   


 


10/9/20 09:00  51 27 100/60 (73) 97   


 


10/9/20 08:00      Mechanical Ventilator  





      Mechanical Ventilator  





      Mechanical Ventilator  


 


10/9/20 08:00  47      


 


10/9/20 08:00 98.6 44 22 98/67 (77) 98   


 


10/9/20 08:00        50


 


10/9/20 07:05  60 24     50


 


10/9/20 07:00  45 21 95/56 (69) 98   


 


10/9/20 06:00  49 29 97/56 (70) 98   


 


10/9/20 05:00  45 29 106/42 (63) 98   


 


10/9/20 04:00 97.7 57 22 120/90 (100) 97   


 


10/9/20 04:00  51      


 


10/9/20 04:00      Mechanical Ventilator  





      Mechanical Ventilator  





      Mechanical Ventilator  


 


10/9/20 04:00        50


 


10/9/20 03:33  49 24     50


 


10/9/20 03:00  56 25 99/48 (65) 98   


 


10/9/20 02:00  49 28 105/42 (63) 99   


 


10/9/20 01:00  46 33 108/83 (91) 100   


 


10/9/20 00:00  46      


 


10/9/20 00:00      Mechanical Ventilator  





      Mechanical Ventilator  





      Mechanical Ventilator  


 


10/9/20 00:00 97.7 47 24 129/87 (101) 100   


 


10/8/20 23:58  48 24     50


 


10/8/20 23:00  44 29 92/54 (67) 100   


 


10/8/20 22:00  45 24 97/64 (75) 100   


 


10/8/20 21:00  45 26 104/57 (73) 100   








I&O











Intake and Output  


 


 10/8/20 10/9/20





 19:00 07:00


 


Intake Total 630 ml 1020 ml


 


Output Total 530 ml 745 ml


 


Balance 100 ml 275 ml


 


  


 


Free Water  120 ml


 


IV Total 600 ml 650 ml


 


Tube Feeding 30 ml 250 ml


 


Output Urine Total 485 ml 620 ml


 


Chest Tube Drainage Total 45 ml 125 ml


 


# Bowel Movements 4 1








Dressing:  dry


Cardiovascular:  RSR


Respiratory:  decreased breath sounds


Abdomen:  soft, non-tender, present bowel sounds


Extremities:  no tenderness, no cyanosis





Laboratory Tests








Test


 10/8/20


23:51 10/9/20


00:11 10/9/20


04:50


 


POC Whole Blood Glucose


 Pending  


 94 MG/DL


() 





 


White Blood Count


 


 


 8.0 K/UL


(4.8-10.8)


 


Red Blood Count


 


 


 2.50 M/UL


(4.20-5.40)  L


 


Hemoglobin


 


 


 7.9 G/DL


(12.0-16.0)  L


 


Hematocrit


 


 


 23.8 %


(37.0-47.0)  L


 


Mean Corpuscular Volume   95 FL (80-99)  


 


Mean Corpuscular Hemoglobin


 


 


 31.6 PG


(27.0-31.0)  H


 


Mean Corpuscular Hemoglobin


Concent 


 


 33.2 G/DL


(32.0-36.0)


 


Red Cell Distribution Width


 


 


 17.6 %


(11.6-14.8)  H


 


Platelet Count


 


 


 66 K/UL


(150-450)  L


 


Mean Platelet Volume


 


 


 9.0 FL


(6.5-10.1)


 


Neutrophils (%) (Auto)


 


 


 % (45.0-75.0)





 


Lymphocytes (%) (Auto)


 


 


 % (20.0-45.0)





 


Monocytes (%) (Auto)    % (1.0-10.0)  


 


Eosinophils (%) (Auto)    % (0.0-3.0)  


 


Basophils (%) (Auto)    % (0.0-2.0)  


 


Sodium Level


 


 


 140 MMOL/L


(136-145)


 


Potassium Level


 


 


 3.9 MMOL/L


(3.5-5.1)


 


Chloride Level


 


 


 109 MMOL/L


()  H


 


Carbon Dioxide Level


 


 


 22 MMOL/L


(21-32)


 


Anion Gap


 


 


 9 mmol/L


(5-15)


 


Blood Urea Nitrogen


 


 


 13 mg/dL


(7-18)


 


Creatinine


 


 


 0.6 MG/DL


(0.55-1.30)


 


Estimat Glomerular Filtration


Rate 


 


 > 60 mL/min


(>60)


 


Glucose Level


 


 


 112 MG/DL


()  H


 


Calcium Level


 


 


 7.1 MG/DL


(8.5-10.1)  L


 


Phosphorus Level


 


 


 2.7 MG/DL


(2.5-4.9)


 


Magnesium Level


 


 


 2.2 MG/DL


(1.8-2.4)


 


Total Bilirubin


 


 


 0.7 MG/DL


(0.2-1.0)


 


Aspartate Amino Transf


(AST/SGOT) 


 


 31 U/L (15-37)





 


Alanine Aminotransferase


(ALT/SGPT) 


 


 36 U/L (12-78)





 


Alkaline Phosphatase


 


 


 77 U/L


()


 


Total Protein


 


 


 5.1 G/DL


(6.4-8.2)  L


 


Albumin


 


 


 1.2 G/DL


(3.4-5.0)  L


 


Globulin   3.9 g/dL  


 


Albumin/Globulin Ratio


 


 


 0.3 (1.0-2.7)


L











Plan


Problems:  


(1) Respiratory distress


Assessment & Plan:  Respiratory insufficiency requiring prolonged ventilator 

support.  Patient on minimal vent settings right now currently in stable but 

unfortunately not safe for extubation.  Tracheostomy is indicated recommended.  

I discussed case with pulmonology team ICU team medical teams.  Patient unable 

to make decisions and her current condition and given her history.  The care 

team has been contacted and will be reviewed for evaluation and consideration of

the tracheostomy.  If consented I think it is reasonable for tracheostomy given 

patient's current CODE STATUS care plan and goals of care.  Extubation his 

current status is potentially high risk for reintubation emergency complication.

 We will plan for tracheostomy if consent is obtained.  Thank you for let me 

participate patient's care will follow with recommendations





will plan for trach when ready


wean fi02 





s/p trach 10?8





(2) Encephalopathy chronic


(3) Dysphagia


(4) Down's syndrome


(5) Sepsis


(6) Acute respiratory failure


(7) Pneumonia


(8) Trisomy 21, Down syndrome


(9) DAVID (acute kidney injury)


(10) Bacteremia


(11) Hypokalemia


(12) Anemia


(13) Diarrhea


(14) Hypernatremia


(15) Pneumothorax


(16) Pneumothorax, right


Assessment & Plan:  right ptx.  s/p chest tube


tube removed 10/3





left ptx tube placed 10/2





(17) Cardiac arrest


(18) ARDS (adult respiratory distress syndrome)


(19) Septic shock


(20) HCAP (healthcare-associated pneumonia)


(21) Respiratory failure requiring intubation


(22) Seizure disorder


(23) Hypothyroidism











Jake Aranda                 Oct 9, 2020 20:38

## 2020-10-09 NOTE — NUR
NURSE NOTES: Pt had small BM. Pt cleaned, repositioned and linen changed. Pt in stable 
condition, SB on the monitor. All other vitals stable.

## 2020-10-09 NOTE — NEPHROLOGY PROGRESS NOTE
Assessment/Plan


Problem List:  


(1) DAVID (acute kidney injury)


(2) Respiratory failure requiring intubation


(3) Down's syndrome


(4) Seizure disorder


(5) Hypothyroidism


Assessment





Acute renal failure, likely due to hypotension


Acute respiratory distress, hypoxia


Seizure disorder


Hypothyroidism


Down syndrome


Full code











Fluid challenge with IV fluids and albumin


Midodrine for BP above 100 systolic


Check TSH level


Check


Correct level


Monitor renal parameters


Urine studies


Per orders


Plan


October 9: Patient now has trach connected to vent.  Left chest tube remains in 

place.  Patient full code.  Continue per consultants.  Labs reviewed.


October 8: Discussed with RN.  Remains on ventilator.  Labs reviewed.  Stable 

from renal standpoint of view.  Patient due for tracheostomy when clinically 

stable.


October 7: On ventilator.  Left chest tube remains.  Full code.  Labs reviewed. 

Electrolyte abnormalities addressed.  Continue per consultants.


October 6: On ventilator.  Tracheostomy postponed because patient is clinically 

unstable.  Still have left chest tube draining.  Labs reviewed.  Electrolyte 

abnormalities addressed.  Continue per consultants.


October 5: Remains intubated on ventilator.  Discussed with RN.  Unclear if the 

patient is due for tracheostomy today or not.  Apparently the patient was 

reintubated again yesterday.  Patient has left chest tube.  Electrolyte 

abnormalities addressed.


October 4: Remains intubated on ventilator.  Due for tracheostomy tomorrow.  

Left chest tube present.  Patient is full code.  Labs reviewed.  Continue as is.


October 3: Remains intubated on ventilator.  Due for tracheostomy October 5.  

Has left-sided chest tube.  Stable from renal standpoint to view.  Continue per 

consultants.


October 2: Remains intubated on ventilator.  Electrolyte abnormalities 

addressed.  Supplements ordered.


October 1: Intubated on ventilator.  Labs reviewed.  Potassium supplement given.

 Remains bradycardic.  Continue per consultants.


September 30: Labs reviewed.  Electrolyte abnormalities addressed.  Heart rate 

remains bradycardic.  Continue per cardiology.  Continue to monitor renal 

parameters and electrolytes.


September 29: Labs reviewed.  Electrolyte abnormalities addressed and replaced. 

Medication list reviewed.  Heart rate remains mid 50s.  Continue as is.


September 28: On ventilator.  Full code.  Heart rate low.  Will discontinue 

Midodrin.  Continue to rest.  Electrolytes within normal limit.  Discussed with 

RN.


September 27: Remains intubated.  Full code.  No plan for tracheostomy yet.  

Labs reviewed.  All acceptable.  Continue same management


September 26: Labs reviewed.  Discussed with RN.  Potassium and phosphorus and 

magnesium replacement ordered.  Hemoglobin 9.5.  Patient remains full code.  

Continue per consultants.


September 25: Lab reviewed.  Renal parameters stable.  Full code.  Intubated.  

Electrolyte abnormalities addressed and replacement done.


September 24: Lab reviewed.  Renal parameters stable.  Full code.  Intubated.  

Has right side chest tube.  Continue per consultants.


September 23: Lab reviewed.  Renal parameters stable.  Full code.  Has right 

chest tube.  Planning process for tracheostomy.  Defer to chest and general 

surgeon.


September 22: Labs reviewed.  Renal parameters stable.  Remains full code.  

Remains on ventilator.  Due for tracheostomy tomorrow.  Continue per 

consultants.  Patient has a right chest tube in place at this time.


September 21: Labs reviewed.  Electrolyte imbalances addressed and supplemented.

 Remains full code and on ventilator.  Continue to monitor renal parameters.  

Continue per consultants.


September 20: Labs reviewed.  Serum potassium again low today.  Potassium 

supplement IV and through GT given.  Patient remains full code and is on nataly

tilator.  Continue per consultants.  Continue to monitor electrolytes and renal 

parameters.


September 19: Labs reviewed.  Abnormal electrolyte addressed.  Remains full 

code.  Remains vented.


September 18: Day 27 of hospitalization.  Full code.  Labs reviewed.  Hemoglobin

down to 7.5.  Electrolyte abnormalities addressed and corrections ordered.  

Continue to monitor renal parameters.  Continue per consultants.  Start on 

Levemir for blood sugar management.  Questioning continuation of hydrocortisone?


September 17: Labs reviewed.  Potassium, phosphorus, hemoglobin, are all low.  

Potassium and phosphorus IV replacement given.  Continue to monitor electrolytes

and CBC.  Patient remains full code.


September 16: Lab reviewed.  Low phosphorus low magnesium and low potassium was 

addressed.  Hemoglobin drifting lower.  Continue per consultants.  Patient 

remains full code.


September 15: Labs reviewed.  Patient continues to be on ventilator.  D5W for 

high sodium and also potassium chloride intravenously as supplement given.  

Hemoglobin 8.4.  Continue to monitor electrolytes and renal parameters.


September 14: Labs reviewed.  Low potassium and high sodium noted.  Hemoglobin 

8.1 stable.  Aim to correct abnormal electrolyte.  Continue rest.  Will give 2 

boluses of D5W 500 cc.


September 13: Lab reviewed.  Abnormal electrolytes noted and addressed.


September 12: Labs reviewed.  Potassium supplement given.  Patient remains full 

code.  Continue per consultants.


September 11: Lab reviewed.  Electrolyte abnormalities addressed.  Continue per 

pulmonary and ID.


September 10: Lab reviewed.  Status unchanged.  Serum sodium 151 unchanged.  

Stable from renal standpoint of view.


September 9: Labs reviewed.  Status quo.  D5W 500 cc IV ordered.  Continue to 

monitor renal parameters.


September 8: Status quo.  Labs reviewed.  Overall condition unchanged.  Patient 

was transfused and hemoglobin higher.  Continue current management.  Patient 

remains full code.


September 7: Status quo.  Overall condition poor.  Very low albumin.  Edematous.

 Hypotensive.  Hemoglobin lower.  Anemia work-up ordered.  I favor transfusion 2

units of packed RBCs.  Patient remains full code.  I favor supportive care only.

 Will discuss.


September 6: Electrolyte abnormalities addressed.  Serum creatinine lower.  

Continue per current management.


September 5: Status unchanged.  Lab reviewed.  Serum potassium 2.7.  IV 

potassium chloride ordered.  Serum creatinine low at 1.6 stable.  Blood pressure

90s systolic


September 4: Status quo.  Labs reviewed.  Renal parameters stable.  Serum 

creatinine down to 1.6.  Medication list reviewed.  Continues to be on 

midodrine.  Continue per consultants.


September 3: Status quo.  Labs reviewed.  Electrolytes adjusted.  Serum 

creatinine down to 1.8.  Continue per consultants.


September 2: Status quo.  Labs reviewed.  Phosphorus supplement IV given.  Serum

creatinine 2.  Continue per consultants.


September 1: Requires less pressors.  Albumin bolus given.  1 dose of Lasix IV 

ordered as the patient severely edematous.  Patient serum albumin is very low.  

Continue per consultants.


August 31: Continues to be intubated.  Labs reviewed.  Serum creatinine 1.9 

unchanged.  Blood pressure more stable.  Off 1 of the pressors.  Continue to 

monitor renal parameters.  Continue per consultants.  Patient now on 

hydrocortisone 100 mg every 8 hours.  Will decrease IV fluid.  Normal saline 

down to 50 cc an hour.


August 30: Intubated.  Labs reviewed.  Creatinine 1.9 unchanged.  Continue same 

treatment plan.  Per consultants.  Overall poor prognosis since the patient 

remains on pressors and her pulmonary status is worsening.


August 29: Remains intubated.  Labs reviewed.  Creatinine 1.9.  Blood pressure 

systolic 90s.  Continue per consultants.


August 28: Remains intubated.  Labs reviewed.  Serum creatinine lower to 2.  

Vancomycin level lower.  Remains hypotensive on pressors.  Will increase 

midodrine to 10 mg every 8 hours.  Continue per consultants.  Continue to 

monitor renal parameters.


August 27: Patient now in ICU.  Intubated.  On pressors.  Labs reviewed.  Will 

increase midodrine.  Aim to keep blood pressure over 100 systolic.  Will give 

albumin bolus.  Will check vancomycin level which was elevated when checked 

previously on August 24.  Will monitor renal parameters.  Continue per 

consultants.





Subjective


ROS Limited/Unobtainable:  Yes





Objective


Objective





Last 24 Hour Vital Signs








  Date Time  Temp Pulse Resp B/P (MAP) Pulse Ox O2 Delivery O2 Flow Rate FiO2


 


10/9/20 10:00  48 23 93/58 (70) 97   


 


10/9/20 09:00  51 27 100/60 (73) 97   


 


10/9/20 08:00      Mechanical Ventilator  





      Mechanical Ventilator  





      Mechanical Ventilator  


 


10/9/20 08:00  47      


 


10/9/20 08:00 98.6 44 22 98/67 (77) 98   


 


10/9/20 08:00        50


 


10/9/20 07:05  60 24     50


 


10/9/20 07:00  45 21 95/56 (69) 98   


 


10/9/20 06:00  49 29 97/56 (70) 98   


 


10/9/20 05:00  45 29 106/42 (63) 98   


 


10/9/20 04:00 97.7 57 22 120/90 (100) 97   


 


10/9/20 04:00  51      


 


10/9/20 04:00      Mechanical Ventilator  





      Mechanical Ventilator  





      Mechanical Ventilator  


 


10/9/20 04:00        50


 


10/9/20 03:33  49 24     50


 


10/9/20 03:00  56 25 99/48 (65) 98   


 


10/9/20 02:00  49 28 105/42 (63) 99   


 


10/9/20 01:00  46 33 108/83 (91) 100   


 


10/9/20 00:00  46      


 


10/9/20 00:00      Mechanical Ventilator  





      Mechanical Ventilator  





      Mechanical Ventilator  


 


10/9/20 00:00 97.7 47 24 129/87 (101) 100   


 


10/8/20 23:58  48 24     50


 


10/8/20 23:00  44 29 92/54 (67) 100   


 


10/8/20 22:00  45 24 97/64 (75) 100   


 


10/8/20 21:00  45 26 104/57 (73) 100   


 


10/8/20 20:00      Mechanical Ventilator  





      Mechanical Ventilator  





      Mechanical Ventilator  


 


10/8/20 20:00        65


 


10/8/20 20:00  47      


 


10/8/20 20:00 97.6 47 23 110/58 (75) 100   


 


10/8/20 19:44  49 24     65


 


10/8/20 19:00  47 27 97/55 (69) 100   


 


10/8/20 18:00  54 18 119/76 (90) 100   


 


10/8/20 17:00 98.6 48 17 96/72 (80) 100   


 


10/8/20 16:00  47      


 


10/8/20 16:00  47 24 105/61 (76) 100   


 


10/8/20 16:00      Mechanical Ventilator  





      Mechanical Ventilator  





      Mechanical Ventilator  


 


10/8/20 15:29  52 24     65


 


10/8/20 15:00  52 22 106/54 (71) 100   


 


10/8/20 14:00  55 22 94/53 (67) 95   


 


10/8/20 13:45  56 22 91/41 (58) 95   


 


10/8/20 13:30  57 30 98/53 (68) 94   


 


10/8/20 13:15  62 20 88/56 (67) 96   


 


10/8/20 13:00  62 20 88/56 (67) 96   


 


10/8/20 13:00  59 31 84/43 (57) 97   


 


10/8/20 12:50  64 16  99   


 


10/8/20 12:45        65


 


10/8/20 12:45  58  83/40 (54)    


 


10/8/20 12:45      Mechanical Ventilator  





      Mechanical Ventilator  





      Mechanical Ventilator  


 


10/8/20 12:00        65


 


10/8/20 12:00  56      


 


10/8/20 11:50 98.5 65 24 126/66 (86) 98   

















Intake and Output  


 


 10/8/20 10/9/20





 19:00 07:00


 


Intake Total 630 ml 1020 ml


 


Output Total 530 ml 745 ml


 


Balance 100 ml 275 ml


 


  


 


Free Water  120 ml


 


IV Total 600 ml 650 ml


 


Tube Feeding 30 ml 250 ml


 


Output Urine Total 485 ml 620 ml


 


Chest Tube Drainage Total 45 ml 125 ml


 


# Bowel Movements 4 1








Laboratory Tests


10/8/20 23:51: POC Whole Blood Glucose [Pending]


10/9/20 00:11: POC Whole Blood Glucose 94


10/9/20 04:50: 


White Blood Count 8.0, Red Blood Count 2.50L, Hemoglobin 7.9L, Hematocrit 23.8L,

 Mean Corpuscular Volume 95, Mean Corpuscular Hemoglobin 31.6H, Mean Corpuscular

 Hemoglobin Concent 33.2, Red Cell Distribution Width 17.6H, Platelet Count 66L,

 Mean Platelet Volume 9.0, Neutrophils (%) (Auto) , Lymphocytes (%) (Auto) , 

Monocytes (%) (Auto) , Eosinophils (%) (Auto) , Basophils (%) (Auto) , Sodium 

Level 140, Potassium Level 3.9, Chloride Level 109H, Carbon Dioxide Level 22, 

Anion Gap 9, Blood Urea Nitrogen 13, Creatinine 0.6, Estimat Glomerular 

Filtration Rate > 60, Glucose Level 112H, Calcium Level 7.1L, Phosphorus Level 

2.7, Magnesium Level 2.2, Total Bilirubin 0.7, Aspartate Amino Transf (AST/SGOT)

 31, Alanine Aminotransferase (ALT/SGPT) 36, Alkaline Phosphatase 77, Total 

Protein 5.1L, Albumin 1.2L, Globulin 3.9, Albumin/Globulin Ratio 0.3L


Height (Feet):  5


Height (Inches):  3.00


Weight (Pounds):  173


General Appearance:  no apparent distress


Cardiovascular:  bradycardia


Respiratory/Chest:  decreased breath sounds


Abdomen:  distended











Lam Nino MD             Oct 9, 2020 11:54

## 2020-10-09 NOTE — NUR
NURSE HAND-OFF REPORT: 



Latest Vital Signs: Temperature 98.5 , Pulse 48 , B/P 93 /49 , Respiratory Rate 24 , O2 SAT 
95 , Mechanical Ventilator, O2 Flow Rate 15.0 .  

Vital Sign Comment: 



EKG Rhythm: Sinus Bradycardia

Rhythm change?: N 

MD Notified?: Y Jean-Pierre Carver MD Response: No New Orders Received



Latest Momin Fall Score: 70  

Fall Risk: High Risk 

Safety Measures: Call light Within Reach, Bed Alarm Zone 1, Side Rails Side Rails x3, Bed 
position Low and Locked.

Fall Precautions: 

Yellow Socks



Report given to LAYTON Lopez.

## 2020-10-09 NOTE — NUR
Nurse Notes: 

patient asleep arousable to light shaking and nonverbal with Shiley 8 trach on ventilator AC 
24  FiO2  50%. BP97/56 HR49 Sinus Jose Daniel on monitor and afebrile. left upper arm 
running 1/2NS @50ml/hr asymptomatic. thoravent on left chest set to low continuous suction.  
gtube running vital AF @40ml/hr, held for Synthroid.  Valle catheter draining yellow urine. 
patient given repositioned and oral care provided.

## 2020-10-09 NOTE — NUR
Nurse Notes: 

patient asleep arousable to light shaking but nonverbal with Shiley 8 trach on ventilator AC 
24  FiO2 65%. /42 HR49 Sinus Jose Daniel on monitor and afebrile. Left upper arm 
running 1/2NS @50ml/hr asymptomatic. thoravent on left chest set to low continuous suction.  
gtube running vital AF @30ml/hr.  Valle catheter draining yellow urine. patient repositioned 
and oral care provided.

## 2020-10-09 NOTE — INFECTIOUS DISEASES PROG NOTE
Assessment/Plan





ASSESSMENT:


sp code blue 8/26


Septic Shock; SP


Fever, recurrent- low grade -SP


Leukocytosis; recurrent; SP\


     10/5 u/a 10-25, nit neg, leuk +2;  ucx PsA (I Genta); colonizer


            sp cx P, mirabilsi (R levo); colonzier


     -9/19 Bcx Neg


   -9/9 u/a no pyuria


   -9/8 Bcx Neg(Picc line)


   -9/6 Bcx Neg


            ucx Neg


             sp cx C. parapsilopsis


  -8/26 u/a no pyuria





Pneumonia.- COVID 19 neg x3


   Acute hypoxic resp failure on VM> NRB 15l 100%; hypoxic on ABG> now VDRF 

8/26- Fio2 80% >100% 8/28> 60% 9/1 >80% 9/2   >95% 9/10 >90% 9/14 >60% 9/15


R tension Pneumothroax sp CT 9/21


      10/8 CXR: bilateral infiltrates are all unchanged.


      10/5 CXR: Extensive bilateral airspace opacities, right greater than left,

 consistent with multifocal infiltrate worse pulmonary edema.  This is  similar 

in appearance to the prior study. Worsening, small left pleural effusion.  This 

may be secondary to  redistribution as the right-sided pleural effusion has 

mildly improved. 


       -9/22 CXR: Interim complete reexpansion of right lung without evidence of

residual pneumothorax. Bilateral diffuse and extensive infiltrates. Markedly 

improved chest wall subcutaneous emphysema


       -9/21 CXR: Interim reexpansion of previously demonstrated right 

pneumothorax, status post large bore chest tube placement.


      -9/14 CXR: Improved aeration of both lungs.


       -9/5 CXR:Small bilateral pleural effusions with minor edema.  Stable 

edema with  mild worsening in the degree of pleural effusion on the right.


         -9/1 sp cx Neg


         8/31 CXR: Extensive bilateral interstitial and airspace disease appears

similar to the prior exam. Moderate to large bilateral pleural effusions appear 

unchanged.


   8/28 CXR: Increasing left upper lobe dense consolidation and likely 

increasing bilateral pleural fluid. Persistent diffuse dense consolidation 

elsewhere


            -8/26 sp cx normal resp lilliam


             -8/25 CXR: Increased atelectasis of the right lung, since prior 

exam of 3 days earlier. New or increased right pleural effusion. Increased left 

basilar consolidation and/or pleural fluid 


          -COVID Rapid PCR neg 8/23, 8/23, 8/26


          -8/22 spc x Group G strep


          -8/22 CXR:   Reduced lung volumes.  Patchy bilateral predominantly 

interstitial  pulmonary opacities.  Could be from edema and/or pneumonia.  There

is a broader differential.


 


        -legionella ag urine, blasto ab, Histo ab, HIV ab screen, JOY, ANCA neg





Persistent, high grade bacteremia-


     -8/22 Bcx 4/4 sets S. haemolyticus; 8/23 Bcx 3/4 S/ epi; 8/27 Bcx 1.4 S. 

warnerri; 8/29 Bcx Neg


     -2d echo: no vegetaions seen





          ua/ wbc 10-15, nit neg, leuk +1; ucx Neg





DAVID; 


 -supratherapeutic vanco levels





-Seizure disorder.


- Hypothyroidism.


- Down syndrome.





History of PEG tube placement.


[facility]








PLAN:


Monitor off abx unless febrile, increasing WBC and/or HD unstable





          9/14 SP Meropenem #18, IV Amikacin #7


          9/4/20 SP Daptomycin #11


          9/2 SP MIcafungin #7, Linezolid #5


   8/28 SP Azithromycin #7/7


   8/26 SP Ceftriaxone #2


   8/25 SP IV Vancomycin #4, Zosyn #4


   8/22 SP Cefepime x1, Flagyl x1





- Monitor CBC, BMP.


.f/u cx


- COVID neg x3


- Monitor chest x-ray.











Thank you, Dr. Cherry, for allowing me to participate in the care of


this patient.  I will follow the patient with you at this


hospitalization.








Discussed with RN





Subjective


Allergies:  


Coded Allergies:  


     No Known Allergies (Unverified , 1/8/19)


afebrile


remains intubated; Fio2 50%


no leukocytosis





Objective





Last 24 Hour Vital Signs








  Date Time  Temp Pulse Resp B/P (MAP) Pulse Ox O2 Delivery O2 Flow Rate FiO2


 


10/9/20 10:00  48 23 93/58 (70) 97   


 


10/9/20 09:00  51 27 100/60 (73) 97   


 


10/9/20 08:00      Mechanical Ventilator  





      Mechanical Ventilator  





      Mechanical Ventilator  


 


10/9/20 08:00  47      


 


10/9/20 08:00 98.6 44 22 98/67 (77) 98   


 


10/9/20 08:00        50


 


10/9/20 07:05  60 24     50


 


10/9/20 07:00  45 21 95/56 (69) 98   


 


10/9/20 06:00  49 29 97/56 (70) 98   


 


10/9/20 05:00  45 29 106/42 (63) 98   


 


10/9/20 04:00 97.7 57 22 120/90 (100) 97   


 


10/9/20 04:00  51      


 


10/9/20 04:00      Mechanical Ventilator  





      Mechanical Ventilator  





      Mechanical Ventilator  


 


10/9/20 04:00        50


 


10/9/20 03:33  49 24     50


 


10/9/20 03:00  56 25 99/48 (65) 98   


 


10/9/20 02:00  49 28 105/42 (63) 99   


 


10/9/20 01:00  46 33 108/83 (91) 100   


 


10/9/20 00:00  46      


 


10/9/20 00:00      Mechanical Ventilator  





      Mechanical Ventilator  





      Mechanical Ventilator  


 


10/9/20 00:00 97.7 47 24 129/87 (101) 100   


 


10/8/20 23:58  48 24     50


 


10/8/20 23:00  44 29 92/54 (67) 100   


 


10/8/20 22:00  45 24 97/64 (75) 100   


 


10/8/20 21:00  45 26 104/57 (73) 100   


 


10/8/20 20:00      Mechanical Ventilator  





      Mechanical Ventilator  





      Mechanical Ventilator  


 


10/8/20 20:00        65


 


10/8/20 20:00  47      


 


10/8/20 20:00 97.6 47 23 110/58 (75) 100   


 


10/8/20 19:44  49 24     65


 


10/8/20 19:00  47 27 97/55 (69) 100   


 


10/8/20 18:00  54 18 119/76 (90) 100   


 


10/8/20 17:00 98.6 48 17 96/72 (80) 100   


 


10/8/20 16:00  47      


 


10/8/20 16:00  47 24 105/61 (76) 100   


 


10/8/20 16:00      Mechanical Ventilator  





      Mechanical Ventilator  





      Mechanical Ventilator  


 


10/8/20 15:29  52 24     65


 


10/8/20 15:00  52 22 106/54 (71) 100   


 


10/8/20 14:00  55 22 94/53 (67) 95   


 


10/8/20 13:45  56 22 91/41 (58) 95   


 


10/8/20 13:30  57 30 98/53 (68) 94   


 


10/8/20 13:15  62 20 88/56 (67) 96   


 


10/8/20 13:00  62 20 88/56 (67) 96   


 


10/8/20 13:00  59 31 84/43 (57) 97   


 


10/8/20 12:50  64 16  99   


 


10/8/20 12:45        65


 


10/8/20 12:45  58  83/40 (54)    


 


10/8/20 12:45      Mechanical Ventilator  





      Mechanical Ventilator  





      Mechanical Ventilator  


 


10/8/20 12:00        65


 


10/8/20 12:00  56      


 


10/8/20 11:50 98.5 65 24 126/66 (86) 98   


 


10/8/20 11:42  62 24     65








Height (Feet):  5


Height (Inches):  3.00


Weight (Pounds):  173


HEENT:  . ETT in place


CHEST:  Coarse breathing sounds.


HEART:  S1 and S2.


ABDOMEN:  Soft.  PEG tube in place.


SKIN: no rash





Laboratory Tests








Test


 10/8/20


23:51 10/9/20


00:11 10/9/20


04:50


 


POC Whole Blood Glucose


 Pending  


 94 MG/DL


() 





 


White Blood Count


 


 


 8.0 K/UL


(4.8-10.8)


 


Red Blood Count


 


 


 2.50 M/UL


(4.20-5.40)  L


 


Hemoglobin


 


 


 7.9 G/DL


(12.0-16.0)  L


 


Hematocrit


 


 


 23.8 %


(37.0-47.0)  L


 


Mean Corpuscular Volume   95 FL (80-99)  


 


Mean Corpuscular Hemoglobin


 


 


 31.6 PG


(27.0-31.0)  H


 


Mean Corpuscular Hemoglobin


Concent 


 


 33.2 G/DL


(32.0-36.0)


 


Red Cell Distribution Width


 


 


 17.6 %


(11.6-14.8)  H


 


Platelet Count


 


 


 66 K/UL


(150-450)  L


 


Mean Platelet Volume


 


 


 9.0 FL


(6.5-10.1)


 


Neutrophils (%) (Auto)


 


 


 % (45.0-75.0)





 


Lymphocytes (%) (Auto)


 


 


 % (20.0-45.0)





 


Monocytes (%) (Auto)    % (1.0-10.0)  


 


Eosinophils (%) (Auto)    % (0.0-3.0)  


 


Basophils (%) (Auto)    % (0.0-2.0)  


 


Sodium Level


 


 


 140 MMOL/L


(136-145)


 


Potassium Level


 


 


 3.9 MMOL/L


(3.5-5.1)


 


Chloride Level


 


 


 109 MMOL/L


()  H


 


Carbon Dioxide Level


 


 


 22 MMOL/L


(21-32)


 


Anion Gap


 


 


 9 mmol/L


(5-15)


 


Blood Urea Nitrogen


 


 


 13 mg/dL


(7-18)


 


Creatinine


 


 


 0.6 MG/DL


(0.55-1.30)


 


Estimat Glomerular Filtration


Rate 


 


 > 60 mL/min


(>60)


 


Glucose Level


 


 


 112 MG/DL


()  H


 


Calcium Level


 


 


 7.1 MG/DL


(8.5-10.1)  L


 


Phosphorus Level


 


 


 2.7 MG/DL


(2.5-4.9)


 


Magnesium Level


 


 


 2.2 MG/DL


(1.8-2.4)


 


Total Bilirubin


 


 


 0.7 MG/DL


(0.2-1.0)


 


Aspartate Amino Transf


(AST/SGOT) 


 


 31 U/L (15-37)





 


Alanine Aminotransferase


(ALT/SGPT) 


 


 36 U/L (12-78)





 


Alkaline Phosphatase


 


 


 77 U/L


()


 


Total Protein


 


 


 5.1 G/DL


(6.4-8.2)  L


 


Albumin


 


 


 1.2 G/DL


(3.4-5.0)  L


 


Globulin   3.9 g/dL  


 


Albumin/Globulin Ratio


 


 


 0.3 (1.0-2.7)


L











Current Medications








 Medications


  (Trade)  Dose


 Ordered  Sig/Didi


 Route


 PRN Reason  Start Time


 Stop Time Status Last Admin


Dose Admin


 


 Acetaminophen


  (Tylenol)  650 mg  Q4H  PRN


 GT


 For Pain  9/23/20 17:15


 10/23/20 17:14  10/2/20 17:46





 


 Chlorhexidine


 Gluconate


  (Beatrice-Hex 2%)  1 applic  DAILY@2000


 TOPIC


   8/31/20 20:00


 11/29/20 19:59  10/8/20 21:11





 


 Clotrimazole


  (Lotrimin)  1 applic  Q12HR


 TOPIC


   8/30/20 13:00


 11/28/20 12:59  10/9/20 08:14





 


 Dextrose


  (Dextrose 50%)  25 ml  Q30M  PRN


 IV


 Hypoglycemia  8/26/20 11:30


 11/20/20 11:29   





 


 Dextrose


  (Dextrose 50%)  50 ml  Q30M  PRN


 IV


 Hypoglycemia  8/26/20 11:30


 11/20/20 11:29   





 


 Hydrocortisone


  (Solu-CORTEF)  50 mg  EVERY 12  HOURS


 IV


   10/2/20 21:00


 12/27/20 20:59  10/9/20 08:13





 


 Insulin Aspart


  (NovoLOG)    Q6HR


 SUBQ


   9/18/20 12:00


 12/17/20 11:59  10/7/20 23:56





 


 Insulin Detemir


  (Levemir)  10 units  Q12HR


 SUBQ


   9/18/20 10:00


 12/17/20 09:59  10/9/20 08:15





 


 Levothyroxine


 Sodium


  (Synthroid)  75 mcg  DAILY@0630


 GT


   9/30/20 06:30


 10/30/20 06:29  10/9/20 05:45





 


 Loperamide HCl


  (Imodium)  2 mg  Q6H  PRN


 NG


 Diarrhea  9/16/20 10:30


 10/16/20 10:29  9/23/20 11:41





 


 Lorazepam


  (Ativan 2mg/ml


 1ml)  2 mg  Q4H  PRN


 IV


 For Anxiety  10/5/20 00:45


 10/12/20 00:44  10/5/20 03:06





 


 Pantoprazole


  (Protonix)  40 mg  EVERY 12  HOURS


 IVP


   9/28/20 21:00


 10/28/20 20:59  10/9/20 08:11





 


 Potassium Chloride


  (K-Dur)  40 meq  EVERY 12  HOURS


 GT


   9/26/20 21:00


 12/19/20 12:14  10/9/20 08:11





 


 Sodium Chloride  1,000 ml @ 


 50 mls/hr  Q20H


 IV


   10/4/20 11:00


 11/3/20 10:59  10/9/20 08:22




















Rema Gomes M.D.             Oct 9, 2020 11:34

## 2020-10-09 NOTE — NUR
RD ASSESSMENT & RECOMMENDATIONS

SEE CARE ACTIVITY FOR COMPLETE ASSESSMENT



DAILY ESTIMATED NEEDS:

Needs based on CRITICAL CARE, 50kg  

22-28  kcals/kg 

9030-7167  total kcals

1.25-2  g protein/kg

  g total protein 

25-30  mL/kg

0583-7601  total fluid mLs



NUTRITION DIAGNOSIS:

Swallowing difficulty r/t dysphagia as evidenced by pt w/ Downs Syndrome,

PEG dep for all nutritional needs, now intubated, off pressor support

pending trach placement.





CURRENT TF:Vital AF 1.2 @ 50ml/hr x 22 hrs (On Synthroid QD)- held for procedure 





ENTERAL NUTRITION RECOMMENDATIONS:

VITAL AF 1.2 @ 50ml/hr x 22 hrs  

to provide 1100ml, 1320 kcal, 83g prot, 892ml free wa ter 



* Maintain current TF: meets 100% est kcal/prot needs

* Hold 1 hr before and after Synthroid med

* HOB over 30 degrees/ water flush per MD





ADDITIONAL RECOMMENDATIONS:

1) Ht of 61 inches per SN[facility]; recalibrate bed scale for accurate CBW  

2) Add NISS: BGs now in the 200's, on Solucortef-> NOW ON NISS + LEVEMIR 

3) Monitor hemodynamic stability: OFF PRESSOR SUPPORT 

4) Wound healing: add Vit C 250mg QD + continue Marcio BID 

5) Monitor lytes, replete as needed (low phos) 

6) Probiotics for diarrhea  

.

## 2020-10-09 NOTE — DIAGNOSTIC IMAGING REPORT
EXAM:

  XR Chest, 1 View

 

CLINICAL HISTORY:

  DYSPNEA

 

TECHNIQUE:

  Frontal view of the chest.

 

COMPARISON:

  No relevant prior studies available.

 

FINDINGS:

  Lungs:  Diffuse airspace opacities.  

  Pleural space:  Small bilateral pleural effusions.

  Heart:  Unremarkable.

  Mediastinum:  Unremarkable.

  Bones/joints:  Unremarkable.

  Tubes, lines and devices:  Tracheostomy cannula within the thoracic 

inlet.  Left upper extremity PICC with tip in the right atrium. Tubular 

structure projects over the left upper lung.  Correlate clinically.

 

IMPRESSION:     

  Diffuse airspace opacities.

## 2020-10-09 NOTE — NUR
NURSE HAND-OFF REPORT: 



Important Events on Shift: Bradycardic, HR 40

Patient Status:

Diet: vital AF @50ml/hr



Pending Orders: 

Pending Results/Labs:

Pending MD notification:



Latest Vital Signs: Temperature 97.8 , Pulse 40 , B/P 93 /46 , Respiratory Rate 24 , O2 SAT 
97 , Mechanical Ventilator, O2 Flow Rate 15.0 .  

Vital Sign Comment: Sinus Jose Daniel, Daneshrad aware.



EKG Rhythm: Sinus Bradycardia

Rhythm change?: N 

MD Notified?: Y Jean-Pierre Carver MD Response: No New Orders Received



Latest Momin Fall Score: 70  

Fall Risk: High Risk 

Safety Measures: Call light Within Reach, Bed Alarm Zone 1, Side Rails Side Rails x3, Bed 
position Low and Locked.

Fall Precautions: 

Yellow Socks



Report given to LAYTON Nolasco.

## 2020-10-09 NOTE — NUR
Nurse Notes: 

patient asleep arousable to light shaking but nonverbal with Shiley 8 trach on ventilator AC 
24  FiO2  50%. /90 HR57 Sinus Jose Daniel on monitor and afebrile. Left upper arm 
running 1/2NS @50ml/hr asymptomatic. thoravent on left chest set to low continuous suction.  
gtube running vital AF @30ml/hr.  Valle catheter draining yellow urine. patient given CHG 
bath, repositioned and oral care provided.

## 2020-10-10 VITALS — DIASTOLIC BLOOD PRESSURE: 58 MMHG | SYSTOLIC BLOOD PRESSURE: 106 MMHG

## 2020-10-10 VITALS — SYSTOLIC BLOOD PRESSURE: 107 MMHG | DIASTOLIC BLOOD PRESSURE: 66 MMHG

## 2020-10-10 VITALS — SYSTOLIC BLOOD PRESSURE: 104 MMHG | DIASTOLIC BLOOD PRESSURE: 63 MMHG

## 2020-10-10 VITALS — SYSTOLIC BLOOD PRESSURE: 100 MMHG | DIASTOLIC BLOOD PRESSURE: 64 MMHG

## 2020-10-10 VITALS — DIASTOLIC BLOOD PRESSURE: 65 MMHG | SYSTOLIC BLOOD PRESSURE: 115 MMHG

## 2020-10-10 VITALS — DIASTOLIC BLOOD PRESSURE: 49 MMHG | SYSTOLIC BLOOD PRESSURE: 101 MMHG

## 2020-10-10 VITALS — SYSTOLIC BLOOD PRESSURE: 113 MMHG | DIASTOLIC BLOOD PRESSURE: 55 MMHG

## 2020-10-10 RX ADMIN — CHLORHEXIDINE GLUCONATE SCH APPLIC: 213 SOLUTION TOPICAL at 20:32

## 2020-10-10 RX ADMIN — INSULIN ASPART SCH UNITS: 100 INJECTION, SOLUTION INTRAVENOUS; SUBCUTANEOUS at 17:51

## 2020-10-10 RX ADMIN — PANTOPRAZOLE SODIUM SCH MG: 40 INJECTION, POWDER, FOR SOLUTION INTRAVENOUS at 09:13

## 2020-10-10 RX ADMIN — INSULIN ASPART SCH UNITS: 100 INJECTION, SOLUTION INTRAVENOUS; SUBCUTANEOUS at 00:28

## 2020-10-10 RX ADMIN — HYDROCORTISONE SODIUM SUCCINATE SCH MG: 100 INJECTION, POWDER, FOR SOLUTION INTRAMUSCULAR; INTRAVENOUS at 22:17

## 2020-10-10 RX ADMIN — INSULIN ASPART SCH UNITS: 100 INJECTION, SOLUTION INTRAVENOUS; SUBCUTANEOUS at 23:57

## 2020-10-10 RX ADMIN — INSULIN DETEMIR SCH UNITS: 100 INJECTION, SOLUTION SUBCUTANEOUS at 20:35

## 2020-10-10 RX ADMIN — INSULIN ASPART SCH UNITS: 100 INJECTION, SOLUTION INTRAVENOUS; SUBCUTANEOUS at 06:05

## 2020-10-10 RX ADMIN — INSULIN ASPART SCH UNITS: 100 INJECTION, SOLUTION INTRAVENOUS; SUBCUTANEOUS at 11:38

## 2020-10-10 RX ADMIN — HYDROCORTISONE SODIUM SUCCINATE SCH MG: 100 INJECTION, POWDER, FOR SOLUTION INTRAMUSCULAR; INTRAVENOUS at 10:25

## 2020-10-10 RX ADMIN — INSULIN DETEMIR SCH UNITS: 100 INJECTION, SOLUTION SUBCUTANEOUS at 09:29

## 2020-10-10 RX ADMIN — PANTOPRAZOLE SODIUM SCH MG: 40 INJECTION, POWDER, FOR SOLUTION INTRAVENOUS at 20:32

## 2020-10-10 NOTE — INFECTIOUS DISEASES PROG NOTE
Assessment/Plan





ASSESSMENT:


sp code blue 8/26


Septic Shock; SP


Fever, recurrent- low grade -SP


Leukocytosis; recurrent; SP


     10/5 u/a 10-25, nit neg, leuk +2;  ucx PsA (I Genta); colonizer


            sp cx P, mirabilsi (R levo); colonzier


     -9/19 Bcx Neg


   -9/9 u/a no pyuria


   -9/8 Bcx Neg(Picc line)


   -9/6 Bcx Neg


            ucx Neg


             sp cx C. parapsilopsis


  -8/26 u/a no pyuria





Pneumonia.- COVID 19 neg x3


   Acute hypoxic resp failure on VM> NRB 15l 100%; hypoxic on ABG> now VDRF 

8/26- Fio2 80% >100% 8/28> 60% 9/1 >80% 9/2   >95% 9/10 >90% 9/14 >60% 9/15


R tension Pneumothroax sp CT 9/21 


    10/9 sp trach


      10/9 CXR:   Diffuse airspace opacities.


      10/8 CXR: bilateral infiltrates are all unchanged.


      10/5 CXR: Extensive bilateral airspace opacities, right greater than left,

 consistent with multifocal infiltrate worse pulmonary edema.  This is  similar 

in appearance to the prior study. Worsening, small left pleural effusion.  This 

may be secondary to  redistribution as the right-sided pleural effusion has 

mildly improved. 


       -9/22 CXR: Interim complete reexpansion of right lung without evidence of

residual pneumothorax. Bilateral diffuse and extensive infiltrates. Markedly 

improved chest wall subcutaneous emphysema


       -9/21 CXR: Interim reexpansion of previously demonstrated right 

pneumothorax, status post large bore chest tube placement.


      -9/14 CXR: Improved aeration of both lungs.


       -9/5 CXR:Small bilateral pleural effusions with minor edema.  Stable 

edema with  mild worsening in the degree of pleural effusion on the right.


         -9/1 sp cx Neg


         8/31 CXR: Extensive bilateral interstitial and airspace disease appears

similar to the prior exam. Moderate to large bilateral pleural effusions appear 

unchanged.


   8/28 CXR: Increasing left upper lobe dense consolidation and likely 

increasing bilateral pleural fluid. Persistent diffuse dense consolidation 

elsewhere


            -8/26 sp cx normal resp lilliam


             -8/25 CXR: Increased atelectasis of the right lung, since prior 

exam of 3 days earlier. New or increased right pleural effusion. Increased left 

basilar consolidation and/or pleural fluid 


          -COVID Rapid PCR neg 8/23, 8/23, 8/26


          -8/22 spc x Group G strep


          -8/22 CXR:   Reduced lung volumes.  Patchy bilateral predominantly 

interstitial  pulmonary opacities.  Could be from edema and/or pneumonia.  There

is a broader differential.


 


        -legionella ag urine, blasto ab, Histo ab, HIV ab screen, JOY, ANCA neg





Persistent, high grade bacteremia-


     -8/22 Bcx 4/4 sets S. haemolyticus; 8/23 Bcx 3/4 S/ epi; 8/27 Bcx 1.4 S. 

warnerri; 8/29 Bcx Neg


     -2d echo: no vegetaions seen





          ua/ wbc 10-15, nit neg, leuk +1; ucx Neg





DAVDI; 


 -supratherapeutic vanco levels





-Seizure disorder.


- Hypothyroidism.


- Down syndrome.





History of PEG tube placement.


[facility]








PLAN:


Monitor off abx unless febrile, increasing WBC and/or HD unstable





          9/14 SP Meropenem #18, IV Amikacin #7


          9/4/20 SP Daptomycin #11


          9/2 SP MIcafungin #7, Linezolid #5


   8/28 SP Azithromycin #7/7


   8/26 SP Ceftriaxone #2


   8/25 SP IV Vancomycin #4, Zosyn #4


   8/22 SP Cefepime x1, Flagyl x1





- Monitor CBC, BMP.


.f/u cx


- COVID neg x3


- Monitor chest x-ray.


-PEG/Trach care


-aspiration precautions








Thank you, Dr. Cherry, for allowing me to participate in the care of


this patient.  I will follow the patient with you at this


hospitalization.








Discussed with RN





Subjective


Allergies:  


Coded Allergies:  


     No Known Allergies (Unverified , 1/8/19)


afebrile


remains intubated; Fio2 45%


no leukocytosis


off abx


transferred out of ICU to [facility]


sp trach yesterday





Objective





Last 24 Hour Vital Signs








  Date Time  Temp Pulse Resp B/P (MAP) Pulse Ox O2 Delivery O2 Flow Rate FiO2


 


10/10/20 16:00      Mechanical Ventilator  





      Mechanical Ventilator  





      Mechanical Ventilator  


 


10/10/20 16:00        45


 


10/10/20 15:06  52 22     45


 


10/10/20 12:00        45


 


10/10/20 12:00      Mechanical Ventilator  





      Mechanical Ventilator  





      Mechanical Ventilator  


 


10/10/20 12:00 98.8 57 24 100/64 (76) 97   


 


10/10/20 11:48  53      


 


10/10/20 11:03  54 24     45


 


10/10/20 08:00      Mechanical Ventilator  





      Mechanical Ventilator  





      Mechanical Ventilator  


 


10/10/20 08:00  48      


 


10/10/20 08:00        45


 


10/10/20 08:00 97.7 57 24 106/58 (74) 96   


 


10/10/20 07:05  57 24     45


 


10/10/20 04:00  61      


 


10/10/20 04:00      Mechanical Ventilator  





      Mechanical Ventilator  





      Mechanical Ventilator  


 


10/10/20 04:00 97.7 60 24 113/55 (74) 96   


 


10/10/20 04:00        45


 


10/10/20 02:59  50 24     45


 


10/10/20 00:00 97.9 52 24 104/63 (77) 93   


 


10/10/20 00:00      Mechanical Ventilator  





      Mechanical Ventilator  





      Mechanical Ventilator  


 


10/9/20 23:24  40 24     45


 


10/9/20 23:00  45 24 96/56 (69) 93   


 


10/9/20 22:00  48 28 93/46 (62) 97   


 


10/9/20 21:00  52 26 93/53 (66) 94   


 


10/9/20 20:00 97.8 52 23 87/48 (61) 96   


 


10/9/20 20:00  52      


 


10/9/20 20:00        45


 


10/9/20 20:00      Mechanical Ventilator  





      Mechanical Ventilator  





      Mechanical Ventilator  


 


10/9/20 19:20  52 24     45


 


10/9/20 19:00  50 23 96/51 (66) 96   


 


10/9/20 18:00  48 24 93/49 (64) 95   


 


10/9/20 17:00  54 27 95/69 (78) 95   








Height (Feet):  5


Height (Inches):  3.00


Weight (Pounds):  173


HEENT:  . ETT in place


CHEST:  Coarse breathing sounds.


HEART:  S1 and S2.


ABDOMEN:  Soft.  PEG tube in place.


SKIN: no rash





Laboratory Tests








Test


 10/10/20


00:09 10/10/20


06:02 10/10/20


09:28


 


POC Whole Blood Glucose


 146 MG/DL


()  H Pending  


 99 MG/DL


()











Current Medications








 Medications


  (Trade)  Dose


 Ordered  Sig/Didi


 Route


 PRN Reason  Start Time


 Stop Time Status Last Admin


Dose Admin


 


 Acetaminophen


  (Tylenol)  650 mg  Q4H  PRN


 GT


 For Pain  9/23/20 17:15


 10/23/20 17:14  10/2/20 17:46





 


 Chlorhexidine


 Gluconate


  (Beatrice-Hex 2%)  1 applic  DAILY@2000


 TOPIC


   8/31/20 20:00


 11/29/20 19:59  10/9/20 20:41





 


 Clotrimazole


  (Lotrimin)  1 applic  Q12HR


 TOPIC


   8/30/20 13:00


 11/28/20 12:59  10/10/20 10:13





 


 Dextrose


  (Dextrose 50%)  25 ml  Q30M  PRN


 IV


 Hypoglycemia  8/26/20 11:30


 11/20/20 11:29   





 


 Dextrose


  (Dextrose 50%)  50 ml  Q30M  PRN


 IV


 Hypoglycemia  8/26/20 11:30


 11/20/20 11:29   





 


 Hydrocortisone


  (Solu-CORTEF)  25 mg  Q12H


 IV


   10/9/20 22:00


 1/7/21 21:59  10/10/20 10:25





 


 Insulin Aspart


  (NovoLOG)    Q6HR


 SUBQ


   9/18/20 12:00


 12/17/20 11:59  10/10/20 06:05





 


 Insulin Detemir


  (Levemir)  10 units  Q12HR


 SUBQ


   9/18/20 10:00


 12/17/20 09:59  10/10/20 09:29





 


 Levothyroxine


 Sodium


  (Synthroid)  75 mcg  DAILY@0630


 GT


   9/30/20 06:30


 10/30/20 06:29  10/10/20 06:29





 


 Loperamide HCl


  (Imodium)  2 mg  Q6H  PRN


 NG


 Diarrhea  9/16/20 10:30


 10/16/20 10:29  9/23/20 11:41





 


 Lorazepam


  (Ativan 2mg/ml


 1ml)  2 mg  Q4H  PRN


 IV


 For Anxiety  10/5/20 00:45


 10/12/20 00:44  10/5/20 03:06





 


 Pantoprazole


  (Protonix)  40 mg  EVERY 12  HOURS


 IVP


   9/28/20 21:00


 10/28/20 20:59  10/10/20 09:13





 


 Potassium Chloride


  (K-Dur)  40 meq  EVERY 12  HOURS


 GT


   9/26/20 21:00


 12/19/20 12:14  10/10/20 09:13





 


 Sodium Chloride  1,000 ml @ 


 50 mls/hr  Q20H


 IV


   10/4/20 11:00


 11/3/20 10:59  10/10/20 06:56




















Rema Gomes M.D.            Oct 10, 2020 16:35

## 2020-10-10 NOTE — NUR
NURSE NOTES:

Received patient in bed, A/O to self.Noted with trach size shiley 8 with settings of AC-24 
TV-500 FiO2-45% tolerated well. In no apparent distress noted. On GT feeding Vital AF at 
50cc tolerated well. Gtube is intact patent and flushed well. No residual notet. Patient has 
thoravent on L chest attached to low continuous suction. Patient has bilateral soft wrist 
restrains. Picc line on L upper Arm running 1/2 NS @ 50 ml/hr, intact, patent and flushed 
well. No infiltration noted. With espinal catheter, draining color yellowish urine with 100ml 
on the bag. Safety measures in place. Will continue to monitor.

## 2020-10-10 NOTE — NUR
NURSE NOTES:

received pt from Jen TRAYLOR., pt is awake and AOX 1-2 at this time, opens eyes. pt is in SB 
50 at this time. Gtube site intact, clean, and patent. Gtube site intact, clean, and patent. 
trach in place, no SOB noted O2sat is at 100%. pt is resting on the bed comfortably. left 
upper arm PICC line intact, clean, and patent. chest tube noted with water sealed without 
suction. chest tube site intact, dry, and patent. call light within reach. will continue to 
monitor pt with plan of care. bed at the lowest positioned, alarmed, and locked.

## 2020-10-10 NOTE — INTERNAL MED PROGRESS NOTE
Subjective


Date of Service:  Oct 10, 2020


Physician Name


Sanya Schultz


Attending Physician


Chano Cherry MD





Current Medications








 Medications


  (Trade)  Dose


 Ordered  Sig/Didi


 Route


 PRN Reason  Start Time


 Stop Time Status Last Admin


Dose Admin


 


 Acetaminophen


  (Tylenol)  650 mg  Q4H  PRN


 GT


 For Pain  9/23/20 17:15


 10/23/20 17:14  10/2/20 17:46





 


 Chlorhexidine


 Gluconate


  (Beatrice-Hex 2%)  1 applic  DAILY@2000


 TOPIC


   8/31/20 20:00


 11/29/20 19:59  10/9/20 20:41





 


 Clotrimazole


  (Lotrimin)  1 applic  Q12HR


 TOPIC


   8/30/20 13:00


 11/28/20 12:59  10/10/20 10:13





 


 Dextrose


  (Dextrose 50%)  25 ml  Q30M  PRN


 IV


 Hypoglycemia  8/26/20 11:30


 11/20/20 11:29   





 


 Dextrose


  (Dextrose 50%)  50 ml  Q30M  PRN


 IV


 Hypoglycemia  8/26/20 11:30


 11/20/20 11:29   





 


 Hydrocortisone


  (Solu-CORTEF)  25 mg  Q12H


 IV


   10/9/20 22:00


 1/7/21 21:59  10/10/20 10:25





 


 Insulin Aspart


  (NovoLOG)    Q6HR


 SUBQ


   9/18/20 12:00


 12/17/20 11:59  10/10/20 06:05





 


 Insulin Detemir


  (Levemir)  10 units  Q12HR


 SUBQ


   9/18/20 10:00


 12/17/20 09:59  10/10/20 09:29





 


 Levothyroxine


 Sodium


  (Synthroid)  75 mcg  DAILY@0630


 GT


   9/30/20 06:30


 10/30/20 06:29  10/10/20 06:29





 


 Loperamide HCl


  (Imodium)  2 mg  Q6H  PRN


 NG


 Diarrhea  9/16/20 10:30


 10/16/20 10:29  9/23/20 11:41





 


 Lorazepam


  (Ativan 2mg/ml


 1ml)  2 mg  Q4H  PRN


 IV


 For Anxiety  10/5/20 00:45


 10/12/20 00:44  10/5/20 03:06





 


 Pantoprazole


  (Protonix)  40 mg  EVERY 12  HOURS


 IVP


   9/28/20 21:00


 10/28/20 20:59  10/10/20 09:13





 


 Potassium Chloride


  (K-Dur)  40 meq  EVERY 12  HOURS


 GT


   9/26/20 21:00


 12/19/20 12:14  10/10/20 09:13





 


 Sodium Chloride  1,000 ml @ 


 50 mls/hr  Q20H


 IV


   10/4/20 11:00


 11/3/20 10:59  10/10/20 06:56











Allergies:  


Coded Allergies:  


     No Known Allergies (Unverified , 1/8/19)


ROS Limited/Unobtainable:  Yes


Subjective


60 YO F with Down's syndrome admitted with hypoxia.  Now sepsis and pneumonia.  

Cover for Int Med-DR Hung.  Step Down Unit





Objective





Last Vital Signs








  Date Time  Temp Pulse Resp B/P (MAP) Pulse Ox O2 Delivery O2 Flow Rate FiO2


 


10/10/20 16:00 97.7 51 24 101/49 (66) 97   


 


10/10/20 16:00      Mechanical Ventilator  





      Mechanical Ventilator  





      Mechanical Ventilator  


 


10/10/20 16:00        45











Laboratory Tests








Test


 10/10/20


00:09 10/10/20


06:02 10/10/20


09:28


 


POC Whole Blood Glucose


 146 MG/DL


()  H Pending  


 99 MG/DL


()

















Intake and Output  


 


 10/9/20 10/10/20





 19:00 07:00


 


Intake Total 1130 ml 860 ml


 


Output Total 1080 ml 650 ml


 


Balance 50 ml 210 ml


 


  


 


Free Water 60 ml 60 ml


 


IV Total 550 ml 200 ml


 


Tube Feeding 520 ml 600 ml


 


Output Urine Total 1065 ml 650 ml


 


Chest Tube Drainage Total 15 ml 


 


# Bowel Movements 1 1








Objective


General Appearance:  WD/WN, no apparent distress, alert


EENT:  PERRL/EOMI, normal ENT inspection


Neck:  non-tender, normal alignment, supple, normal inspection


Cardiovascular:  normal peripheral pulses, normal rate, regular rhythm, no ga

llop/murmur, no JVD


Respiratory/Chest:  Mech vent; decreased breath sounds, crackles/rales, rhonchi 

- bilaterally, expiratory wheezing


Abdomen:  normal bowel sounds, non tender, soft, no organomegaly, no mass


Extremities:  normal range of motion


Neurologic:  CNs II-XII grossly normal


Skin:  normal pigmentation, warm/dry





Assessment/Plan


Problem List:  


(1) HCAP (healthcare-associated pneumonia)


Assessment & Plan:  Strep Group G.  S/P amikacin per ID=Dr Gomes.  

Pulmonary/Critical care=DR Cherry.  COVID NEG





(2) Sepsis


Assessment & Plan:  Staph haemolyticus.  S/P amikacin per ID=Dr Gomes





(3) Down's syndrome


(4) Dysphagia


Assessment & Plan:  S/P PEG





(5) Seizure disorder


Assessment & Plan:  Continue keppra and depakote





(6) Hypothyroidism


Assessment & Plan:  Continue synthroid





(7) Acute respiratory failure


Assessment & Plan:  Pulmonary = Dr Cherry; mech vent





(8) Pneumothorax, right


Assessment & Plan:  Continue chest tube per pulmonary





(9) Cardiac arrest


Assessment & Plan:  8/26/20-see cardiology note=Sanya Dunn MD               Oct 10, 2020 17:50

## 2020-10-10 NOTE — NUR
NURSE NOTES:

Patient noted with small BM. Cleaned, repositioned and linen changed. Noted SB on the 
monitor. Noted FLACC score of 0/10. No sign of discomfort or distress at this time. Valle 
draining well noted with yellow urine with some sediments. Will continue to monitor.

## 2020-10-10 NOTE — NEPHROLOGY PROGRESS NOTE
Assessment/Plan


Problem List:  


(1) DAVID (acute kidney injury)


(2) Respiratory failure requiring intubation


(3) Down's syndrome


(4) Seizure disorder


(5) Hypothyroidism


Assessment





Acute renal failure, likely due to hypotension


Acute respiratory distress, hypoxia


Seizure disorder


Hypothyroidism


Down syndrome


Full code











Fluid challenge with IV fluids and albumin


Midodrine for BP above 100 systolic


Check TSH level


Check


Correct level


Monitor renal parameters


Urine studies


Per orders


Plan


October 10: Trached and vent.  Still has left chest tube to drain.  No chemistry

panel today.  Continue per consultants.  Patient full code.


October 9: Patient now has trach connected to vent.  Left chest tube remains in 

place.  Patient full code.  Continue per consultants.  Labs reviewed.


October 8: Discussed with RN.  Remains on ventilator.  Labs reviewed.  Stable 

from renal standpoint of view.  Patient due for tracheostomy when clinically 

stable.


October 7: On ventilator.  Left chest tube remains.  Full code.  Labs reviewed. 

Electrolyte abnormalities addressed.  Continue per consultants.


October 6: On ventilator.  Tracheostomy postponed because patient is clinically 

unstable.  Still have left chest tube draining.  Labs reviewed.  Electrolyte 

abnormalities addressed.  Continue per consultants.


October 5: Remains intubated on ventilator.  Discussed with RN.  Unclear if the 

patient is due for tracheostomy today or not.  Apparently the patient was 

reintubated again yesterday.  Patient has left chest tube.  Electrolyte 

abnormalities addressed.


October 4: Remains intubated on ventilator.  Due for tracheostomy tomorrow.  

Left chest tube present.  Patient is full code.  Labs reviewed.  Continue as is.


October 3: Remains intubated on ventilator.  Due for tracheostomy October 5.  

Has left-sided chest tube.  Stable from renal standpoint to view.  Continue per 

consultants.


October 2: Remains intubated on ventilator.  Electrolyte abnormalities 

addressed.  Supplements ordered.


October 1: Intubated on ventilator.  Labs reviewed.  Potassium supplement given.

 Remains bradycardic.  Continue per consultants.


September 30: Labs reviewed.  Electrolyte abnormalities addressed.  Heart rate 

remains bradycardic.  Continue per cardiology.  Continue to monitor renal 

parameters and electrolytes.


September 29: Labs reviewed.  Electrolyte abnormalities addressed and replaced. 

Medication list reviewed.  Heart rate remains mid 50s.  Continue as is.


September 28: On ventilator.  Full code.  Heart rate low.  Will discontinue 

Midodrin.  Continue to rest.  Electrolytes within normal limit.  Discussed with 

RN.


September 27: Remains intubated.  Full code.  No plan for tracheostomy yet.  

Labs reviewed.  All acceptable.  Continue same management


September 26: Labs reviewed.  Discussed with RN.  Potassium and phosphorus and 

magnesium replacement ordered.  Hemoglobin 9.5.  Patient remains full code.  

Continue per consultants.


September 25: Lab reviewed.  Renal parameters stable.  Full code.  Intubated.  

Electrolyte abnormalities addressed and replacement done.


September 24: Lab reviewed.  Renal parameters stable.  Full code.  Intubated.  

Has right side chest tube.  Continue per consultants.


September 23: Lab reviewed.  Renal parameters stable.  Full code.  Has right 

chest tube.  Planning process for tracheostomy.  Defer to chest and general 

surgeon.


September 22: Labs reviewed.  Renal parameters stable.  Remains full code.  

Remains on ventilator.  Due for tracheostomy tomorrow.  Continue per ayden

tanatilio.  Patient has a right chest tube in place at this time.


September 21: Labs reviewed.  Electrolyte imbalances addressed and supplemented.

 Remains full code and on ventilator.  Continue to monitor renal parameters.  

Continue per consultants.


September 20: Labs reviewed.  Serum potassium again low today.  Potassium 

supplement IV and through GT given.  Patient remains full code and is on 

ventilator.  Continue per consultants.  Continue to monitor electrolytes and 

renal parameters.


September 19: Labs reviewed.  Abnormal electrolyte addressed.  Remains full 

code.  Remains vented.


September 18: Day 27 of hospitalization.  Full code.  Labs reviewed.  Hemoglobin

down to 7.5.  Electrolyte abnormalities addressed and corrections ordered.  

Continue to monitor renal parameters.  Continue per consultants.  Start on 

Levemir for blood sugar management.  Questioning continuation of hydrocortisone?


September 17: Labs reviewed.  Potassium, phosphorus, hemoglobin, are all low.  

Potassium and phosphorus IV replacement given.  Continue to monitor electrolytes

and CBC.  Patient remains full code.


September 16: Lab reviewed.  Low phosphorus low magnesium and low potassium was 

addressed.  Hemoglobin drifting lower.  Continue per consultants.  Patient 

remains full code.


September 15: Labs reviewed.  Patient continues to be on ventilator.  D5W for 

high sodium and also potassium chloride intravenously as supplement given.  

Hemoglobin 8.4.  Continue to monitor electrolytes and renal parameters.


September 14: Labs reviewed.  Low potassium and high sodium noted.  Hemoglobin 

8.1 stable.  Aim to correct abnormal electrolyte.  Continue rest.  Will give 2 

boluses of D5W 500 cc.


September 13: Lab reviewed.  Abnormal electrolytes noted and addressed.


September 12: Labs reviewed.  Potassium supplement given.  Patient remains full 

code.  Continue per consultants.


September 11: Lab reviewed.  Electrolyte abnormalities addressed.  Continue per 

pulmonary and ID.


September 10: Lab reviewed.  Status unchanged.  Serum sodium 151 unchanged.  

Stable from renal standpoint of view.


September 9: Labs reviewed.  Status quo.  D5W 500 cc IV ordered.  Continue to 

monitor renal parameters.


September 8: Status quo.  Labs reviewed.  Overall condition unchanged.  Patient 

was transfused and hemoglobin higher.  Continue current management.  Patient 

remains full code.


September 7: Status quo.  Overall condition poor.  Very low albumin.  Edematous.

 Hypotensive.  Hemoglobin lower.  Anemia work-up ordered.  I favor transfusion 2

units of packed RBCs.  Patient remains full code.  I favor supportive care only.

 Will discuss.


September 6: Electrolyte abnormalities addressed.  Serum creatinine lower.  

Continue per current management.


September 5: Status unchanged.  Lab reviewed.  Serum potassium 2.7.  IV 

potassium chloride ordered.  Serum creatinine low at 1.6 stable.  Blood pressure

90s systolic


September 4: Status quo.  Labs reviewed.  Renal parameters stable.  Serum 

creatinine down to 1.6.  Medication list reviewed.  Continues to be on 

midodrine.  Continue per consultants.


September 3: Status quo.  Labs reviewed.  Electrolytes adjusted.  Serum 

creatinine down to 1.8.  Continue per consultants.


September 2: Status quo.  Labs reviewed.  Phosphorus supplement IV given.  Serum

creatinine 2.  Continue per consultants.


September 1: Requires less pressors.  Albumin bolus given.  1 dose of Lasix IV 

ordered as the patient severely edematous.  Patient serum albumin is very low.  

Continue per consultants.


August 31: Continues to be intubated.  Labs reviewed.  Serum creatinine 1.9 

unchanged.  Blood pressure more stable.  Off 1 of the pressors.  Continue to 

monitor renal parameters.  Continue per consultants.  Patient now on 

hydrocortisone 100 mg every 8 hours.  Will decrease IV fluid.  Normal saline 

down to 50 cc an hour.


August 30: Intubated.  Labs reviewed.  Creatinine 1.9 unchanged.  Continue same 

treatment plan.  Per consultants.  Overall poor prognosis since the patient 

remains on pressors and her pulmonary status is worsening.


August 29: Remains intubated.  Labs reviewed.  Creatinine 1.9.  Blood pressure 

systolic 90s.  Continue per consultants.


August 28: Remains intubated.  Labs reviewed.  Serum creatinine lower to 2.  

Vancomycin level lower.  Remains hypotensive on pressors.  Will increase 

midodrine to 10 mg every 8 hours.  Continue per consultants.  Continue to 

monitor renal parameters.


August 27: Patient now in ICU.  Intubated.  On pressors.  Labs reviewed.  Will 

increase midodrine.  Aim to keep blood pressure over 100 systolic.  Will give 

albumin bolus.  Will check vancomycin level which was elevated when checked 

previously on August 24.  Will monitor renal parameters.  Continue per 

consultants.





Subjective


ROS Limited/Unobtainable:  Yes





Objective


Objective





Last 24 Hour Vital Signs








  Date Time  Temp Pulse Resp B/P (MAP) Pulse Ox O2 Delivery O2 Flow Rate FiO2


 


10/10/20 08:00      Mechanical Ventilator  





      Mechanical Ventilator  





      Mechanical Ventilator  


 


10/10/20 08:00  48      


 


10/10/20 08:00        45


 


10/10/20 08:00 97.7 57 24 106/58 (74) 96   


 


10/10/20 07:05  57 24     45


 


10/10/20 04:00  61      


 


10/10/20 04:00      Mechanical Ventilator  





      Mechanical Ventilator  





      Mechanical Ventilator  


 


10/10/20 04:00 97.7 60 24 113/55 (74) 96   


 


10/10/20 04:00        45


 


10/10/20 02:59  50 24     45


 


10/10/20 00:00 97.9 52 24 104/63 (77) 93   


 


10/10/20 00:00      Mechanical Ventilator  





      Mechanical Ventilator  





      Mechanical Ventilator  


 


10/9/20 23:24  40 24     45


 


10/9/20 23:00  45 24 96/56 (69) 93   


 


10/9/20 22:00  48 28 93/46 (62) 97   


 


10/9/20 21:00  52 26 93/53 (66) 94   


 


10/9/20 20:00 97.8 52 23 87/48 (61) 96   


 


10/9/20 20:00  52      


 


10/9/20 20:00        45


 


10/9/20 20:00      Mechanical Ventilator  





      Mechanical Ventilator  





      Mechanical Ventilator  


 


10/9/20 19:20  52 24     45


 


10/9/20 19:00  50 23 96/51 (66) 96   


 


10/9/20 18:00  48 24 93/49 (64) 95   


 


10/9/20 17:00  54 27 95/69 (78) 95   


 


10/9/20 16:00  51      


 


10/9/20 16:00        50


 


10/9/20 16:00      Mechanical Ventilator  





      Mechanical Ventilator  





      Mechanical Ventilator  


 


10/9/20 16:00 98.5 55 23 96/50 (65) 96   


 


10/9/20 15:00  43 24     45


 


10/9/20 15:00  46 27 129/78 (95) 98   


 


10/9/20 14:00  49 26 105/66 (79) 98   


 


10/9/20 13:00  49 24 116/54 (74) 98   


 


10/9/20 12:00 98.3 50 24 102/65 (77) 98   


 


10/9/20 12:00  46      


 


10/9/20 12:00        50


 


10/9/20 12:00      Mechanical Ventilator  





      Mechanical Ventilator  





      Mechanical Ventilator  


 


10/9/20 11:57  53 24     50

















Intake and Output  


 


 10/9/20 10/10/20





 19:00 07:00


 


Intake Total 1130 ml 810 ml


 


Output Total 1080 ml 650 ml


 


Balance 50 ml 160 ml


 


  


 


Free Water 60 ml 60 ml


 


IV Total 550 ml 200 ml


 


Tube Feeding 520 ml 550 ml


 


Output Urine Total 1065 ml 650 ml


 


Chest Tube Drainage Total 15 ml 


 


# Bowel Movements 1 1








No chemistry panel done today 





laboratory Tests


10/10/20 00:09: POC Whole Blood Glucose 146H


10/10/20 06:02: POC Whole Blood Glucose [Pending]


10/10/20 09:28: POC Whole Blood Glucose 99


Height (Feet):  5


Height (Inches):  3.00


Weight (Pounds):  173


General Appearance:  no apparent distress


Cardiovascular:  normal rate


Respiratory/Chest:  decreased breath sounds


Abdomen:  soft











Lam Nino MD            Oct 10, 2020 11:01

## 2020-10-10 NOTE — NUR
NURSE HAND-OFF REPORT: 



Important Events on Shift: Transfer from ICU

Patient Status: stable

Diet: GT feeding



Pending Orders: 

Pending Results/Labs:

Pending MD notification:



Latest Vital Signs: Temperature 97.7 , Pulse 60 , B/P 113 /55 , Respiratory Rate 24 , O2 SAT 
96 , Mechanical Ventilator, O2 Flow Rate 15.0 .  

Vital Sign Comment: 



EKG Rhythm: Sinus Bradycardia

Rhythm change?: N 

MD Notified?: RENU Carver MD Response: No New Orders Received



Latest Mmoin Fall Score: 70  

Fall Risk: High Risk 

Safety Measures: Call light Within Reach, Bed Alarm Zone 1, Side Rails Side Rails x3, Bed 
position Low and Locked.

Fall Precautions: 

Yellow Socks



Report given to LAYTON Li.

## 2020-10-10 NOTE — NUR
NURSE NOTES:

Patient noted asleep. FLACC score of 0/10 noted. No apparent distress or discomfort noted. 
Patient appears to be tolerating vent settings well as saturation is noted to be 100%. Chest 
tube draining well attached to low continuous suction, noted with serous output. Bed locked 
and alarm armed. Safety checks performed and maintained at all times. Will continue with 
current plan of care.

## 2020-10-10 NOTE — SURGERY PROGRESS NOTE
Surgery Progress Note


Subjective


Procedure Performed


Tracheostomy


Additional Comments


no acute events


left CT placed to water seal


AM CXR ordered





Objective





Last 24 Hour Vital Signs








  Date Time  Temp Pulse Resp B/P (MAP) Pulse Ox O2 Delivery O2 Flow Rate FiO2


 


10/10/20 12:00        45


 


10/10/20 12:00      Mechanical Ventilator  





      Mechanical Ventilator  





      Mechanical Ventilator  


 


10/10/20 12:00 98.8 57 24 100/64 (76) 97   


 


10/10/20 11:48  53      


 


10/10/20 11:03  54 24     45


 


10/10/20 08:00      Mechanical Ventilator  





      Mechanical Ventilator  





      Mechanical Ventilator  


 


10/10/20 08:00  48      


 


10/10/20 08:00        45


 


10/10/20 08:00 97.7 57 24 106/58 (74) 96   


 


10/10/20 07:05  57 24     45


 


10/10/20 04:00  61      


 


10/10/20 04:00      Mechanical Ventilator  





      Mechanical Ventilator  





      Mechanical Ventilator  


 


10/10/20 04:00 97.7 60 24 113/55 (74) 96   


 


10/10/20 04:00        45


 


10/10/20 02:59  50 24     45


 


10/10/20 00:00 97.9 52 24 104/63 (77) 93   


 


10/10/20 00:00      Mechanical Ventilator  





      Mechanical Ventilator  





      Mechanical Ventilator  


 


10/9/20 23:24  40 24     45


 


10/9/20 23:00  45 24 96/56 (69) 93   


 


10/9/20 22:00  48 28 93/46 (62) 97   


 


10/9/20 21:00  52 26 93/53 (66) 94   


 


10/9/20 20:00 97.8 52 23 87/48 (61) 96   


 


10/9/20 20:00  52      


 


10/9/20 20:00        45


 


10/9/20 20:00      Mechanical Ventilator  





      Mechanical Ventilator  





      Mechanical Ventilator  


 


10/9/20 19:20  52 24     45


 


10/9/20 19:00  50 23 96/51 (66) 96   


 


10/9/20 18:00  48 24 93/49 (64) 95   


 


10/9/20 17:00  54 27 95/69 (78) 95   


 


10/9/20 16:00  51      


 


10/9/20 16:00        50


 


10/9/20 16:00      Mechanical Ventilator  





      Mechanical Ventilator  





      Mechanical Ventilator  


 


10/9/20 16:00 98.5 55 23 96/50 (65) 96   


 


10/9/20 15:00  43 24     45


 


10/9/20 15:00  46 27 129/78 (95) 98   


 


10/9/20 14:00  49 26 105/66 (79) 98   


 


10/9/20 13:00  49 24 116/54 (74) 98   








I&O











Intake and Output  


 


 10/9/20 10/10/20





 19:00 07:00


 


Intake Total 1130 ml 810 ml


 


Output Total 1080 ml 650 ml


 


Balance 50 ml 160 ml


 


  


 


Free Water 60 ml 60 ml


 


IV Total 550 ml 200 ml


 


Tube Feeding 520 ml 550 ml


 


Output Urine Total 1065 ml 650 ml


 


Chest Tube Drainage Total 15 ml 


 


# Bowel Movements 1 1








Cardiovascular:  RSR


Respiratory:  decreased breath sounds


Abdomen:  soft, non-tender, present bowel sounds


Extremities:  no tenderness, no cyanosis





Laboratory Tests








Test


 10/10/20


00:09 10/10/20


06:02 10/10/20


09:28


 


POC Whole Blood Glucose


 146 MG/DL


()  H Pending  


 99 MG/DL


()











Plan


Problems:  


(1) Respiratory distress


Assessment & Plan:  Respiratory insufficiency requiring prolonged ventilator 

support.  Patient on minimal vent settings right now currently in stable but 

unfortunately not safe for extubation.  Tracheostomy is indicated recommended.  

I discussed case with pulmonology team ICU team medical teams.  Patient unable 

to make decisions and her current condition and given her history.  The care 

team has been contacted and will be reviewed for evaluation and consideration of

the tracheostomy.  If consented I think it is reasonable for tracheostomy given 

patient's current CODE STATUS care plan and goals of care.  Extubation his 

current status is potentially high risk for reintubation emergency complication.

 We will plan for tracheostomy if consent is obtained.  Thank you for let me 

participate patient's care will follow with recommendations





will plan for trach when ready


wean fi02 





s/p trach 10?8





(2) Encephalopathy chronic


(3) Dysphagia


(4) Down's syndrome


(5) Sepsis


(6) Acute respiratory failure


(7) Pneumonia


(8) Trisomy 21, Down syndrome


(9) DAVID (acute kidney injury)


(10) Bacteremia


(11) Hypokalemia


(12) Anemia


(13) Diarrhea


(14) Hypernatremia


(15) Pneumothorax


(16) Pneumothorax, right


Assessment & Plan:  right ptx.  s/p chest tube


tube removed 10/3





left ptx tube placed 10/2


left CT to water seal 





(17) Cardiac arrest


(18) ARDS (adult respiratory distress syndrome)


(19) Septic shock


(20) HCAP (healthcare-associated pneumonia)


(21) Respiratory failure requiring intubation


(22) Seizure disorder


(23) Hypothyroidism











Jake Aranda                Oct 10, 2020 12:56

## 2020-10-10 NOTE — CARDIOLOGY PROGRESS NOTE
Assessment/Plan


Problem List:  


(1) Diarrhea


(2) Anemia


(3) Hypernatremia


(4) Hypokalemia


(5) Down's syndrome


(6) Acute respiratory failure


(7) Pneumonia


(8) Respiratory failure requiring intubation


Status:  stable, progressing


Status Narrative


Pt w/ Down's syndrome, admitted w/ pneumonia, respiratory failure. previous s 

epi bacteremia. Covid negative


She has sinus bradycardia, persistent, but without symptoms or hemodynamic 

compromise.


Nl LV function by ECHO


Now s/p  L CT for Ptx and s/p trach


Completed iv abx course for  pneumonia and uti


Assessment/Plan


Continue supportive care.


Wean from vent as tolerated


Avoid negative chronotropic/ dromotropic agents, as pt w/ baseline sinus 

bradycardia, intrinsic SN disease.





Subjective


ROS Limited/Unobtainable:  Yes


Subjective


Cardiology for Dr. Jorgensen





Pt transferred out of ICU. She is intubated/ awake. Opens eyes to voice.





Objective





Last 24 Hour Vital Signs








  Date Time  Temp Pulse Resp B/P (MAP) Pulse Ox O2 Delivery O2 Flow Rate FiO2


 


10/10/20 16:00      Mechanical Ventilator  





      Mechanical Ventilator  





      Mechanical Ventilator  


 


10/10/20 16:00        45


 


10/10/20 15:06  52 22     45


 


10/10/20 12:00        45


 


10/10/20 12:00      Mechanical Ventilator  





      Mechanical Ventilator  





      Mechanical Ventilator  


 


10/10/20 12:00 98.8 57 24 100/64 (76) 97   


 


10/10/20 11:48  53      


 


10/10/20 11:03  54 24     45


 


10/10/20 08:00      Mechanical Ventilator  





      Mechanical Ventilator  





      Mechanical Ventilator  


 


10/10/20 08:00  48      


 


10/10/20 08:00        45


 


10/10/20 08:00 97.7 57 24 106/58 (74) 96   


 


10/10/20 07:05  57 24     45


 


10/10/20 04:00  61      


 


10/10/20 04:00      Mechanical Ventilator  





      Mechanical Ventilator  





      Mechanical Ventilator  


 


10/10/20 04:00 97.7 60 24 113/55 (74) 96   


 


10/10/20 04:00        45


 


10/10/20 02:59  50 24     45


 


10/10/20 00:00 97.9 52 24 104/63 (77) 93   


 


10/10/20 00:00      Mechanical Ventilator  





      Mechanical Ventilator  





      Mechanical Ventilator  


 


10/9/20 23:24  40 24     45


 


10/9/20 23:00  45 24 96/56 (69) 93   


 


10/9/20 22:00  48 28 93/46 (62) 97   


 


10/9/20 21:00  52 26 93/53 (66) 94   


 


10/9/20 20:00 97.8 52 23 87/48 (61) 96   


 


10/9/20 20:00  52      


 


10/9/20 20:00        45


 


10/9/20 20:00      Mechanical Ventilator  





      Mechanical Ventilator  





      Mechanical Ventilator  


 


10/9/20 19:20  52 24     45


 


10/9/20 19:00  50 23 96/51 (66) 96   


 


10/9/20 18:00  48 24 93/49 (64) 95   


 


10/9/20 17:00  54 27 95/69 (78) 95   








General Appearance:  alert, on vent, patient on isolation


EENT:  PERRL/EOMI, other -


Neck:  non-tender, supple, other - s/p trach


Rhythm:  SB


Cardiovascular:  no gallop/murmur, bradycardia


Respiratory/Chest:  no respiratory distress, other - L chest tube. Bilat 

scattered rhonchi


Abdomen:  non tender, soft, other - +  g tube











Intake and Output  


 


 10/9/20 10/10/20





 19:00 07:00


 


Intake Total 1130 ml 860 ml


 


Output Total 1080 ml 650 ml


 


Balance 50 ml 210 ml


 


  


 


Free Water 60 ml 60 ml


 


IV Total 550 ml 200 ml


 


Tube Feeding 520 ml 600 ml


 


Output Urine Total 1065 ml 650 ml


 


Chest Tube Drainage Total 15 ml 


 


# Bowel Movements 1 1











Laboratory Tests








Test


 10/10/20


00:09 10/10/20


06:02 10/10/20


09:28


 


POC Whole Blood Glucose


 146 MG/DL


()  H Pending  


 99 MG/DL


()

















Delmy Parry MD          Oct 10, 2020 16:38

## 2020-10-10 NOTE — NUR
NURSE HAND-OFF REPORT: 



Important Events on Shift:Always Jose Daniel. Next Shift is aware

Patient Status: Stable

Diet: Vital Af 1.2 @50cc/hr



Pending Orders: 

Pending Results/Labs:

Pending MD notification:



Latest Vital Signs: Temperature 97.7 , Pulse 45 , B/P 101 /49 , Respiratory Rate 23 , O2 SAT 
97 , Mechanical Ventilator, O2 Flow Rate 15.0 .  

Vital Sign Comment: Stable 



EKG Rhythm: Sinus Bradycardia

Rhythm change?: N 

MD Notified?: Y -Dr Mehul MENDENHALL Response: No New Orders Received



Latest Momin Fall Score: 70  

Fall Risk: High Risk 

Safety Measures: Call light Within Reach, Bed Alarm Zone 1, Side Rails Side Rails x3, Bed 
position Low and Locked.

Fall Precautions: 

Yellow Socks



Report given to .

## 2020-10-10 NOTE — PULMONOLOGY PROGRESS NOTE
Subjective


ROS Limited/Unobtainable:  Yes


Allergies:  


Coded Allergies:  


     No Known Allergies (Unverified , 1/8/19)


Subjective


transferred to [facility] 


no fevers, 


no leukocytosis 


no resp distress





Objective





Last 24 Hour Vital Signs








  Date Time  Temp Pulse Resp B/P (MAP) Pulse Ox O2 Delivery O2 Flow Rate FiO2


 


10/10/20 04:00  61      


 


10/10/20 04:00      Mechanical Ventilator  





      Mechanical Ventilator  





      Mechanical Ventilator  


 


10/10/20 04:00 97.7 60 24 113/55 (74) 96   


 


10/10/20 04:00        45


 


10/10/20 02:59  50 24     45


 


10/10/20 00:00 97.9 52 24 104/63 (77) 93   


 


10/10/20 00:00      Mechanical Ventilator  





      Mechanical Ventilator  





      Mechanical Ventilator  


 


10/9/20 23:24  40 24     45


 


10/9/20 23:00  45 24 96/56 (69) 93   


 


10/9/20 22:00  48 28 93/46 (62) 97   


 


10/9/20 21:00  52 26 93/53 (66) 94   


 


10/9/20 20:00 97.8 52 23 87/48 (61) 96   


 


10/9/20 20:00  52      


 


10/9/20 20:00        45


 


10/9/20 20:00      Mechanical Ventilator  





      Mechanical Ventilator  





      Mechanical Ventilator  


 


10/9/20 19:20  52 24     45


 


10/9/20 19:00  50 23 96/51 (66) 96   


 


10/9/20 18:00  48 24 93/49 (64) 95   


 


10/9/20 17:00  54 27 95/69 (78) 95   


 


10/9/20 16:00  51      


 


10/9/20 16:00        50


 


10/9/20 16:00      Mechanical Ventilator  





      Mechanical Ventilator  





      Mechanical Ventilator  


 


10/9/20 16:00 98.5 55 23 96/50 (65) 96   


 


10/9/20 15:00  43 24     45


 


10/9/20 15:00  46 27 129/78 (95) 98   


 


10/9/20 14:00  49 26 105/66 (79) 98   


 


10/9/20 13:00  49 24 116/54 (74) 98   


 


10/9/20 12:00 98.3 50 24 102/65 (77) 98   


 


10/9/20 12:00  46      


 


10/9/20 12:00        50


 


10/9/20 12:00      Mechanical Ventilator  





      Mechanical Ventilator  





      Mechanical Ventilator  


 


10/9/20 11:57  53 24     50


 


10/9/20 11:00  48 20 110/58 (75) 97   


 


10/9/20 10:00  48 23 93/58 (70) 97   


 


10/9/20 09:00  51 27 100/60 (73) 97   


 


10/9/20 08:00      Mechanical Ventilator  





      Mechanical Ventilator  





      Mechanical Ventilator  


 


10/9/20 08:00  47      


 


10/9/20 08:00 98.6 44 22 98/67 (77) 98   


 


10/9/20 08:00        50

















Intake and Output  


 


 10/9/20 10/10/20





 19:00 07:00


 


Intake Total 1130 ml 810 ml


 


Output Total 1080 ml 650 ml


 


Balance 50 ml 160 ml


 


  


 


Free Water 60 ml 60 ml


 


IV Total 550 ml 200 ml


 


Tube Feeding 520 ml 550 ml


 


Output Urine Total 1065 ml 650 ml


 


Chest Tube Drainage Total 15 ml 


 


# Bowel Movements 1 1








Objective


Status:   on Vent -24- 45%  no PEEP


Condition:  critical


HEENT:  atraumatic, normocephalic,  face with Down features,   


Neck: trach Shiley #8, in place, intact' secretions small amount, thin  

consistency,  clear


Lungs:  R sided CT   with serosanguineous drainage   ,  scattered rhonchi


Heart:  SR with BB, + 1 edema BLE


Abdomen:  soft, non-tender, G tube


Decubiti:  +1 edema BLE 


Lines: LUE PICC intact


Laboratory Tests


10/10/20 00:09: POC Whole Blood Glucose 146H


10/10/20 06:02: POC Whole Blood Glucose [Pending]





Current Medications








 Medications


  (Trade)  Dose


 Ordered  Sig/Didi


 Route


 PRN Reason  Start Time


 Stop Time Status Last Admin


Dose Admin


 


 Acetaminophen


  (Tylenol)  650 mg  Q4H  PRN


 GT


 For Pain  9/23/20 17:15


 10/23/20 17:14  10/2/20 17:46





 


 Chlorhexidine


 Gluconate


  (Beatrice-Hex 2%)  1 applic  DAILY@2000


 TOPIC


   8/31/20 20:00


 11/29/20 19:59  10/9/20 20:41





 


 Clotrimazole


  (Lotrimin)  1 applic  Q12HR


 TOPIC


   8/30/20 13:00


 11/28/20 12:59  10/9/20 20:42





 


 Dextrose


  (Dextrose 50%)  25 ml  Q30M  PRN


 IV


 Hypoglycemia  8/26/20 11:30


 11/20/20 11:29   





 


 Dextrose


  (Dextrose 50%)  50 ml  Q30M  PRN


 IV


 Hypoglycemia  8/26/20 11:30


 11/20/20 11:29   





 


 Hydrocortisone


  (Solu-CORTEF)  25 mg  Q12H


 IV


   10/9/20 22:00


 1/7/21 21:59   





 


 Insulin Aspart


  (NovoLOG)    Q6HR


 SUBQ


   9/18/20 12:00


 12/17/20 11:59  10/10/20 06:05





 


 Insulin Detemir


  (Levemir)  10 units  Q12HR


 SUBQ


   9/18/20 10:00


 12/17/20 09:59  10/9/20 20:43





 


 Levothyroxine


 Sodium


  (Synthroid)  75 mcg  DAILY@0630


 GT


   9/30/20 06:30


 10/30/20 06:29  10/10/20 06:29





 


 Loperamide HCl


  (Imodium)  2 mg  Q6H  PRN


 NG


 Diarrhea  9/16/20 10:30


 10/16/20 10:29  9/23/20 11:41





 


 Lorazepam


  (Ativan 2mg/ml


 1ml)  2 mg  Q4H  PRN


 IV


 For Anxiety  10/5/20 00:45


 10/12/20 00:44  10/5/20 03:06





 


 Pantoprazole


  (Protonix)  40 mg  EVERY 12  HOURS


 IVP


   9/28/20 21:00


 10/28/20 20:59  10/9/20 20:41





 


 Potassium Chloride


  (K-Dur)  40 meq  EVERY 12  HOURS


 GT


   9/26/20 21:00


 12/19/20 12:14  10/9/20 20:41





 


 Sodium Chloride  1,000 ml @ 


 50 mls/hr  Q20H


 IV


   10/4/20 11:00


 11/3/20 10:59  10/10/20 06:56














Assessment/Plan


Assessment/Plan


ASSESSMENT


s/p cardiopulmonary arrest


Acute hypoxemic respiratory failure requiring intubation 


Failure to wean


s/p trach 10/8 


Sepsis with shock


Persistent CONS bacteremia


Pneumonia FELICE


Pulm edema/ ARDS


R PTX, s/p CT placement 9/21


DAVID due to ATN   2 to hypotension 


Down syndrome


Hypothyroidism


Seizure disorder 


Anemia, requiring blood transfusion 


DM





PLAN OF CARE


[facility]


vent support, pulmonary toilet   


trach care 


CXR 10/9 - Diffuse airspace opacities.


Venous duplex BLE   NGT,


CT still there, PNT resolved, 


need to be removed  


 


ABG noted : decrease AC to 22, repeat ABG in am 





on steroids-Hydrocortisone, decreased frequency  


off pressors;   


off  midodrine now as well 


Echo with pEF 65%  





s/p abx   


COVID-19 x3 NGT


prior persistent CONS bacteremia


Echo no evidence of vegetation 


however BCX 8/29, 9/6, 9/8, 9/19 NGT 


per ID recs keep off abx unless febrile or evidence of infection


10/5 UCX +PSA colonizer as per ID 


Legionella Ag urine, blasto Ab,  Histo Ab, HIV ab screen, JOY, ANCA neg


now SCX 10/5  + Proteus, awaiting ID recs, possibly colonizer 





stool OB + x1 and - x 2


prior off  Heparin given   on  SCD


s/p 3 u PRBC


GI procedures on hold given current condition  


HH stabilized  


GI prophylaxis 





creat  prior worsened due to Vanco , now resolved 


gentle IVF


avoid nephrotoxic  


correct electrolytes as needed


fup with  further nephro recs 


 


continue  levothyroxine , TSH within normal limits 


BS management with LA Levemir and SSI as needed 


seizure precautions,   


supportive care   


 


case discussed and evaluated by supervising physician











Joyce Welch NP                Oct 10, 2020 08:04

## 2020-10-10 NOTE — NUR
NURSE NOTES:

Patien noted asleep. FLACC score of 0/10 noted. No apparent distress or discomfort noted. 
Patient appears to be tolerating vent settings well as saturation is noted to be 100%. Chest 
tube draining well noted with serous output.. Bed locked and alarm armed. Safety checks 
performed and maintained at all times. Will continue with current plan of care.

## 2020-10-11 VITALS — DIASTOLIC BLOOD PRESSURE: 63 MMHG | SYSTOLIC BLOOD PRESSURE: 105 MMHG

## 2020-10-11 VITALS — DIASTOLIC BLOOD PRESSURE: 78 MMHG | SYSTOLIC BLOOD PRESSURE: 138 MMHG

## 2020-10-11 VITALS — DIASTOLIC BLOOD PRESSURE: 71 MMHG | SYSTOLIC BLOOD PRESSURE: 117 MMHG

## 2020-10-11 VITALS — DIASTOLIC BLOOD PRESSURE: 85 MMHG | SYSTOLIC BLOOD PRESSURE: 121 MMHG

## 2020-10-11 VITALS — SYSTOLIC BLOOD PRESSURE: 130 MMHG | DIASTOLIC BLOOD PRESSURE: 86 MMHG

## 2020-10-11 LAB
ADD MANUAL DIFF: YES
ALBUMIN SERPL-MCNC: 1.3 G/DL (ref 3.4–5)
ALBUMIN/GLOB SERPL: 0.4 {RATIO} (ref 1–2.7)
ALP SERPL-CCNC: 81 U/L (ref 46–116)
ALT SERPL-CCNC: 35 U/L (ref 12–78)
ANION GAP SERPL CALC-SCNC: 6 MMOL/L (ref 5–15)
AST SERPL-CCNC: 22 U/L (ref 15–37)
BILIRUB SERPL-MCNC: 0.7 MG/DL (ref 0.2–1)
BUN SERPL-MCNC: 14 MG/DL (ref 7–18)
CALCIUM SERPL-MCNC: 7.3 MG/DL (ref 8.5–10.1)
CHLORIDE SERPL-SCNC: 112 MMOL/L (ref 98–107)
CO2 SERPL-SCNC: 24 MMOL/L (ref 21–32)
CREAT SERPL-MCNC: 0.5 MG/DL (ref 0.55–1.3)
ERYTHROCYTE [DISTWIDTH] IN BLOOD BY AUTOMATED COUNT: 17.9 % (ref 11.6–14.8)
GLOBULIN SER-MCNC: 3.3 G/DL
HCT VFR BLD CALC: 23.7 % (ref 37–47)
HGB BLD-MCNC: 7.8 G/DL (ref 12–16)
MCV RBC AUTO: 95 FL (ref 80–99)
PHOSPHATE SERPL-MCNC: 2.8 MG/DL (ref 2.5–4.9)
PLATELET # BLD: 81 K/UL (ref 150–450)
POTASSIUM SERPL-SCNC: 3.8 MMOL/L (ref 3.5–5.1)
RBC # BLD AUTO: 2.49 M/UL (ref 4.2–5.4)
SODIUM SERPL-SCNC: 142 MMOL/L (ref 136–145)
WBC # BLD AUTO: 6.9 K/UL (ref 4.8–10.8)

## 2020-10-11 RX ADMIN — HYDROCORTISONE SODIUM SUCCINATE SCH MG: 100 INJECTION, POWDER, FOR SOLUTION INTRAMUSCULAR; INTRAVENOUS at 10:06

## 2020-10-11 RX ADMIN — INSULIN ASPART SCH UNITS: 100 INJECTION, SOLUTION INTRAVENOUS; SUBCUTANEOUS at 12:00

## 2020-10-11 RX ADMIN — INSULIN ASPART SCH UNITS: 100 INJECTION, SOLUTION INTRAVENOUS; SUBCUTANEOUS at 17:51

## 2020-10-11 RX ADMIN — CHLORHEXIDINE GLUCONATE SCH APPLIC: 213 SOLUTION TOPICAL at 20:57

## 2020-10-11 RX ADMIN — INSULIN DETEMIR SCH UNITS: 100 INJECTION, SOLUTION SUBCUTANEOUS at 21:03

## 2020-10-11 RX ADMIN — PANTOPRAZOLE SODIUM SCH MG: 40 INJECTION, POWDER, FOR SOLUTION INTRAVENOUS at 08:10

## 2020-10-11 RX ADMIN — PANTOPRAZOLE SODIUM SCH MG: 40 INJECTION, POWDER, FOR SOLUTION INTRAVENOUS at 20:57

## 2020-10-11 RX ADMIN — INSULIN DETEMIR SCH UNITS: 100 INJECTION, SOLUTION SUBCUTANEOUS at 08:12

## 2020-10-11 RX ADMIN — HYDROCORTISONE SODIUM SUCCINATE SCH MG: 100 INJECTION, POWDER, FOR SOLUTION INTRAMUSCULAR; INTRAVENOUS at 21:07

## 2020-10-11 RX ADMIN — INSULIN ASPART SCH UNITS: 100 INJECTION, SOLUTION INTRAVENOUS; SUBCUTANEOUS at 05:43

## 2020-10-11 NOTE — NUR
NURSE HAND-OFF REPORT: 



Important Events on Shift: hbg 7.8

Patient Status: stable

Diet: Vital 1.2 @50ml/hr



Pending Orders: none

Pending Results/Labs:n/a

Pending MD notification:n/a



Latest Vital Signs: Temperature 98.3 , Pulse 45 , B/P 105 /63 , Respiratory Rate 20 , O2 SAT 
100 , Mechanical Ventilator, O2 Flow Rate 15.0 .  

Vital Sign Comment: stable



EKG Rhythm: Sinus Bradycardia

Rhythm change?: N 

MD Notified?: Y Jean-Pierre Carver MD Response: No New Orders Received



Latest Momin Fall Score: 70  

Fall Risk: High Risk 

Safety Measures: Call light Within Reach, Bed Alarm Zone 1, Side Rails Side Rails x3, Bed 
position Low and Locked.

Fall Precautions: 

Yellow Socks



Report given to Jen TRAYLOR

## 2020-10-11 NOTE — NUR
NURSE HAND-OFF REPORT: 



Important Events on Shift:Hgb 7.8, Notified Dr Cherry, Dr Schultz is aware

Patient Status: Stable

Diet: Vital 1.2 @50 cc/hr



Pending Orders: 

Pending Results/Labs:

Pending MD notification:



Latest Vital Signs: Temperature 97.7 , Pulse 44 , B/P 130 /86 , Respiratory Rate 22 , O2 SAT 
98 , Mechanical Ventilator, O2 Flow Rate 15.0 .  

Vital Sign Comment:Stable



EKG Rhythm: Sinus Bradycardia

Rhythm change?: N 

MD Notified?: Y -Dr Mehul MENDENHALL Response: No New Orders Received



Latest Momin Fall Score: 70  

Fall Risk: High Risk 

Safety Measures: Call light Within Reach, Bed Alarm Zone 1, Side Rails Side Rails x3, Bed 
position Low and Locked.

Fall Precautions: 

Yellow Socks



Report given to Melany TRAYLOR.

## 2020-10-11 NOTE — CARDIOLOGY PROGRESS NOTE
Assessment/Plan


Problem List:  


(1) Anemia


(2) Hypernatremia


(3) Hypokalemia


(4) Down's syndrome


(5) Acute respiratory failure


(6) Pneumonia


(7) Respiratory failure requiring intubation


Status:  stable, unchanged


Status Narrative


Pt w/ Down's syndrome, admitted w/ pneumonia, respiratory failure. previous s 

epi bacteremia. Covid negative


She has sinus bradycardia, persistent, but without symptoms or hemodynamic 

compromise.


Nl LV function by ECHO


Now s/p  L CT for Ptx and s/p trach


Completed iv abx course for  pneumonia and uti


Assessment/Plan


Continue supportive care.


Wean from vent as tolerated


Avoid negative chronotropic/ dromotropic agents, as pt w/ baseline sinus 

bradycardia, intrinsic SN disease.  


Will check thyroid function tests - free T4 and TSH.  She has hx of 

hypothyroidism and is on synthroid





Subjective


ROS Limited/Unobtainable:  Yes


Subjective


Cardiology for Dr. Jorgensen





Pt transferred out of ICU. She is intubated and sedated.





Objective





Last 24 Hour Vital Signs








  Date Time  Temp Pulse Resp B/P (MAP) Pulse Ox O2 Delivery O2 Flow Rate FiO2


 


10/11/20 12:00 98.2 57 20 117/71 (86) 97   


 


10/11/20 12:00        70


 


10/11/20 12:00      Mechanical Ventilator  





      Mechanical Ventilator  





      Mechanical Ventilator  


 


10/11/20 11:40  48      


 


10/11/20 11:11  51 20     45


 


10/11/20 08:00 97.9 54 24 121/85 (97) 98   


 


10/11/20 08:00      Mechanical Ventilator  





      Mechanical Ventilator  





      Mechanical Ventilator  


 


10/11/20 08:00        70


 


10/11/20 07:44  42      


 


10/11/20 07:10  49 22     45


 


10/11/20 04:00        70


 


10/11/20 04:00      Mechanical Ventilator  





      Mechanical Ventilator  





      Mechanical Ventilator  


 


10/11/20 04:00 98.3 45 20 105/63 (77) 100   


 


10/11/20 03:48  43      


 


10/11/20 02:35  50 22     45


 


10/11/20 00:00      Mechanical Ventilator  





      Mechanical Ventilator  





      Mechanical Ventilator  


 


10/10/20 23:56 97.9 40 20 115/65 (82) 100   


 


10/10/20 23:49  39      


 


10/10/20 22:53  51 22     45


 


10/10/20 20:39 97.7 51 20 107/66 (80) 97   


 


10/10/20 20:00      Mechanical Ventilator  





      Mechanical Ventilator  





      Mechanical Ventilator  


 


10/10/20 20:00        70


 


10/10/20 19:28  51      


 


10/10/20 18:49  45 23     45


 


10/10/20 16:00 97.7 51 24 101/49 (66) 97   


 


10/10/20 16:00      Mechanical Ventilator  





      Mechanical Ventilator  





      Mechanical Ventilator  


 


10/10/20 16:00        70


 


10/10/20 15:24  47      








General Appearance:  no apparent distress, on vent, other - sedated


EENT:  PERRL/EOMI


Neck:  other - trach


Rhythm:  SB


Cardiovascular:  no gallop/murmur, bradycardia


Respiratory/Chest:  other - bilateral rhonchi and transmitted upper airway 

sounds


Abdomen:  non tender, soft, other - g tube


Extremities:  no swelling











Intake and Output  


 


 10/10/20 10/11/20





 19:00 07:00


 


Intake Total 770 ml 1165 ml


 


Output Total 1130 ml 1200 ml


 


Balance -360 ml -35 ml


 


  


 


Free Water 120 ml 80 ml


 


IV Total 50 ml 515 ml


 


Tube Feeding 600 ml 550 ml


 


Blood Product  20 ml


 


Output Urine Total 950 ml 1200 ml


 


Chest Tube Drainage Total 180 ml 


 


# Bowel Movements 1 2











Laboratory Tests








Test


 10/10/20


17:50 10/10/20


20:34 10/10/20


23:54 10/11/20


03:00


 


POC Whole Blood Glucose


 105 MG/DL


() Pending  


 93 MG/DL


() 





 


White Blood Count


 


 


 


 6.9 K/UL


(4.8-10.8)


 


Red Blood Count


 


 


 


 2.49 M/UL


(4.20-5.40)  L


 


Hemoglobin


 


 


 


 7.8 G/DL


(12.0-16.0)  L


 


Hematocrit


 


 


 


 23.7 %


(37.0-47.0)  L


 


Mean Corpuscular Volume    95 FL (80-99)  


 


Mean Corpuscular Hemoglobin


 


 


 


 31.5 PG


(27.0-31.0)  H


 


Mean Corpuscular Hemoglobin


Concent 


 


 


 33.1 G/DL


(32.0-36.0)


 


Red Cell Distribution Width


 


 


 


 17.9 %


(11.6-14.8)  H


 


Platelet Count


 


 


 


 81 K/UL


(150-450)  L


 


Mean Platelet Volume


 


 


 


 9.4 FL


(6.5-10.1)


 


Neutrophils (%) (Auto)


 


 


 


 % (45.0-75.0)





 


Lymphocytes (%) (Auto)


 


 


 


 % (20.0-45.0)





 


Monocytes (%) (Auto)     % (1.0-10.0)  


 


Eosinophils (%) (Auto)     % (0.0-3.0)  


 


Basophils (%) (Auto)     % (0.0-2.0)  


 


Differential Total Cells


Counted 


 


 


 100  





 


Neutrophils % (Manual)    61 % (45-75)  


 


Lymphocytes % (Manual)    33 % (20-45)  


 


Monocytes % (Manual)    4 % (1-10)  


 


Eosinophils % (Manual)    2 % (0-3)  


 


Basophils % (Manual)    0 % (0-2)  


 


Band Neutrophils    0 % (0-8)  


 


Platelet Estimate    Decreased  L


 


Platelet Morphology    Normal  


 


Hypochromasia    1+  


 


Anisocytosis    1+  


 


Sodium Level


 


 


 


 142 MMOL/L


(136-145)


 


Potassium Level


 


 


 


 3.8 MMOL/L


(3.5-5.1)


 


Chloride Level


 


 


 


 112 MMOL/L


()  H


 


Carbon Dioxide Level


 


 


 


 24 MMOL/L


(21-32)


 


Anion Gap


 


 


 


 6 mmol/L


(5-15)


 


Blood Urea Nitrogen


 


 


 


 14 mg/dL


(7-18)


 


Creatinine


 


 


 


 0.5 MG/DL


(0.55-1.30)  L


 


Estimat Glomerular Filtration


Rate 


 


 


 > 60 mL/min


(>60)


 


Glucose Level


 


 


 


 114 MG/DL


()  H


 


Uric Acid


 


 


 


 3.1 MG/DL


(2.6-7.2)


 


Calcium Level


 


 


 


 7.3 MG/DL


(8.5-10.1)  L


 


Phosphorus Level


 


 


 


 2.8 MG/DL


(2.5-4.9)


 


Magnesium Level


 


 


 


 1.8 MG/DL


(1.8-2.4)


 


Total Bilirubin


 


 


 


 0.7 MG/DL


(0.2-1.0)


 


Aspartate Amino Transf


(AST/SGOT) 


 


 


 22 U/L (15-37)





 


Alanine Aminotransferase


(ALT/SGPT) 


 


 


 35 U/L (12-78)





 


Alkaline Phosphatase


 


 


 


 81 U/L


()


 


Total Protein


 


 


 


 4.6 G/DL


(6.4-8.2)  L


 


Albumin


 


 


 


 1.3 G/DL


(3.4-5.0)  L


 


Globulin    3.3 g/dL  


 


Albumin/Globulin Ratio


 


 


 


 0.4 (1.0-2.7)


L


 


Test


 10/11/20


05:06 10/11/20


07:56 10/11/20


08:08 10/11/20


11:10


 


POC Whole Blood Glucose


 123 MG/DL


()  H 


 88 MG/DL


() 109 MG/DL


()  H


 


Arterial Blood pH


 


 7.503


(7.350-7.450) 


 





 


Arterial Blood Partial


Pressure CO2 


 28.5 mmHg


(35.0-45.0)  L 


 





 


Arterial Blood Partial


Pressure O2 


 92.9 mmHg


(75.0-100.0) 


 





 


Arterial Blood HCO3


 


 21.9 mmol/L


(22.0-26.0)  L 


 





 


Arterial Blood Oxygen


Saturation 


 97.0 %


() 


 





 


Arterial Blood Base Excess  -0.8 (-2-2)    


 


Cole Test  Positive    

















Delmy Parry MD          Oct 11, 2020 15:11

## 2020-10-11 NOTE — PULMONOLOGY PROGRESS NOTE
Subjective


ROS Limited/Unobtainable:  Yes


Allergies:  


Coded Allergies:  


     No Known Allergies (Unverified , 1/8/19)


Subjective


CT placed to water seal 10/10 


CXR this am  10/11 -> Diffuse airspace opacities. 


ABG noted on AC 22 


no fevers, 


no leukocytosis 


no resp distress





Objective





Last 24 Hour Vital Signs








  Date Time  Temp Pulse Resp B/P (MAP) Pulse Ox O2 Delivery O2 Flow Rate FiO2


 


10/11/20 08:00 97.9 54 24 121/85 (97) 98   


 


10/11/20 08:00      Mechanical Ventilator  





      Mechanical Ventilator  





      Mechanical Ventilator  


 


10/11/20 08:00        70


 


10/11/20 07:44  42      


 


10/11/20 07:10  49 22     45


 


10/11/20 04:00        70


 


10/11/20 04:00      Mechanical Ventilator  





      Mechanical Ventilator  





      Mechanical Ventilator  


 


10/11/20 04:00 98.3 45 20 105/63 (77) 100   


 


10/11/20 03:48  43      


 


10/11/20 02:35  50 22     45


 


10/11/20 00:00      Mechanical Ventilator  





      Mechanical Ventilator  





      Mechanical Ventilator  


 


10/10/20 23:56 97.9 40 20 115/65 (82) 100   


 


10/10/20 23:49  39      


 


10/10/20 22:53  51 22     45


 


10/10/20 20:39 97.7 51 20 107/66 (80) 97   


 


10/10/20 20:00      Mechanical Ventilator  





      Mechanical Ventilator  





      Mechanical Ventilator  


 


10/10/20 20:00        70


 


10/10/20 19:28  51      


 


10/10/20 18:49  45 23     45


 


10/10/20 16:00 97.7 51 24 101/49 (66) 97   


 


10/10/20 16:00      Mechanical Ventilator  





      Mechanical Ventilator  





      Mechanical Ventilator  


 


10/10/20 16:00        70


 


10/10/20 15:24  47      


 


10/10/20 15:06  52 22     45


 


10/10/20 12:00        45


 


10/10/20 12:00      Mechanical Ventilator  





      Mechanical Ventilator  





      Mechanical Ventilator  


 


10/10/20 12:00 98.8 57 24 100/64 (76) 97   


 


10/10/20 11:48  53      


 


10/10/20 11:03  54 24     45

















Intake and Output  


 


 10/10/20 10/11/20





 19:00 07:00


 


Intake Total 770 ml 1145 ml


 


Output Total 1130 ml 1200 ml


 


Balance -360 ml -55 ml


 


  


 


Free Water 120 ml 80 ml


 


IV Total 50 ml 515 ml


 


Tube Feeding 600 ml 550 ml


 


Output Urine Total 950 ml 1200 ml


 


Chest Tube Drainage Total 180 ml 


 


# Bowel Movements 1 2








Objective


Status:   on Vent -22- 45%  no PEEP


Condition:  critical


HEENT:  atraumatic, normocephalic,  face with Down features,   


Neck: trach Shiley #8, in place, intact' secretions small amount, thin  

consistency,  clear


Lungs:  R sided CT   with serosanguineous drainage   ,   few scattered rhonchi


Heart:  SR with BB, + 1 edema BLE


Abdomen:  soft, non-tender, G tube


Decubiti:  +1 edema BLE 


Lines: LUE PICC intact


Laboratory Tests


10/10/20 17:50: POC Whole Blood Glucose 105


10/10/20 20:34: POC Whole Blood Glucose [Pending]


10/10/20 23:54: POC Whole Blood Glucose 93


10/11/20 03:00: 


White Blood Count 6.9, Red Blood Count 2.49L, Hemoglobin 7.8L, Hematocrit 23.7L,

 Mean Corpuscular Volume 95, Mean Corpuscular Hemoglobin 31.5H, Mean Corpuscular

 Hemoglobin Concent 33.1, Red Cell Distribution Width 17.9H, Platelet Count 81L,

 Mean Platelet Volume 9.4, Neutrophils (%) (Auto) , Lymphocytes (%) (Auto) , 

Monocytes (%) (Auto) , Eosinophils (%) (Auto) , Basophils (%) (Auto) , Di

fferential Total Cells Counted 100, Neutrophils % (Manual) 61, Lymphocytes % 

(Manual) 33, Monocytes % (Manual) 4, Eosinophils % (Manual) 2, Basophils % 

(Manual) 0, Band Neutrophils 0, Platelet Estimate DecreasedL, Platelet 

Morphology Normal, Hypochromasia 1+, Anisocytosis 1+, Sodium Level 142, 

Potassium Level 3.8, Chloride Level 112H, Carbon Dioxide Level 24, Anion Gap 6, 

Blood Urea Nitrogen 14, Creatinine 0.5L, Estimat Glomerular Filtration Rate > 

60, Glucose Level 114H, Uric Acid 3.1, Calcium Level 7.3L, Phosphorus Level 2.8,

 Magnesium Level 1.8, Total Bilirubin 0.7, Aspartate Amino Transf (AST/SGOT) 22,

 Alanine Aminotransferase (ALT/SGPT) 35, Alkaline Phosphatase 81, Total Protein 

4.6L, Albumin 1.3L, Globulin 3.3, Albumin/Globulin Ratio 0.4L


10/11/20 05:06: POC Whole Blood Glucose 123H


10/11/20 07:56: 


Arterial Blood pH 7.503H, Arterial Blood Partial Pressure CO2 28.5L, Arterial 

Blood Partial Pressure O2 92.9, Arterial Blood HCO3 21.9L, Arterial Blood Oxygen

 Saturation 97.0, Arterial Blood Base Excess -0.8, Cole Test Positive


10/11/20 08:08: POC Whole Blood Glucose 88





Current Medications








 Medications


  (Trade)  Dose


 Ordered  Sig/Didi


 Route


 PRN Reason  Start Time


 Stop Time Status Last Admin


Dose Admin


 


 Acetaminophen


  (Tylenol)  650 mg  Q4H  PRN


 GT


 For Pain  9/23/20 17:15


 10/23/20 17:14  10/2/20 17:46





 


 Chlorhexidine


 Gluconate


  (Beatrice-Hex 2%)  1 applic  DAILY@2000


 TOPIC


   8/31/20 20:00


 11/29/20 19:59  10/10/20 20:32





 


 Clotrimazole


  (Lotrimin)  1 applic  Q12HR


 TOPIC


   8/30/20 13:00


 11/28/20 12:59  10/11/20 08:13





 


 Dextrose


  (Dextrose 50%)  25 ml  Q30M  PRN


 IV


 Hypoglycemia  8/26/20 11:30


 11/20/20 11:29   





 


 Dextrose


  (Dextrose 50%)  50 ml  Q30M  PRN


 IV


 Hypoglycemia  8/26/20 11:30


 11/20/20 11:29   





 


 Hydrocortisone


  (Solu-CORTEF)  25 mg  Q12H


 IV


   10/9/20 22:00


 1/7/21 21:59  10/11/20 10:06





 


 Insulin Aspart


  (NovoLOG)    Q6HR


 SUBQ


   9/18/20 12:00


 12/17/20 11:59  10/10/20 06:05





 


 Insulin Detemir


  (Levemir)  10 units  Q12HR


 SUBQ


   9/18/20 10:00


 12/17/20 09:59  10/11/20 08:12





 


 Levothyroxine


 Sodium


  (Synthroid)  75 mcg  DAILY@0630


 GT


   9/30/20 06:30


 10/30/20 06:29  10/11/20 05:59





 


 Loperamide HCl


  (Imodium)  2 mg  Q6H  PRN


 NG


 Diarrhea  9/16/20 10:30


 10/16/20 10:29  9/23/20 11:41





 


 Lorazepam


  (Ativan 2mg/ml


 1ml)  2 mg  Q4H  PRN


 IV


 For Anxiety  10/5/20 00:45


 10/12/20 00:44  10/5/20 03:06





 


 Pantoprazole


  (Protonix)  40 mg  EVERY 12  HOURS


 IVP


   9/28/20 21:00


 10/28/20 20:59  10/11/20 08:10





 


 Potassium Chloride


  (K-Dur)  40 meq  EVERY 12  HOURS


 GT


   9/26/20 21:00


 12/19/20 12:14  10/11/20 08:10





 


 Sodium Chloride  1,000 ml @ 


 50 mls/hr  Q20H


 IV


   10/4/20 11:00


 11/3/20 10:59  10/11/20 02:42














Assessment/Plan


Assessment/Plan


ASSESSMENT


s/p cardiopulmonary arrest


Acute hypoxemic respiratory failure requiring intubation 


Failure to wean


s/p trach 10/8 


Sepsis with shock


Persistent CONS bacteremia


Pneumonia FELICE


Pulm edema/ ARDS


R PTX, s/p CT placement 9/21 and subsequent removal  


L PTX s/p CT placement  10/2 


DAVID due to ATN   2 to hypotension 


Down syndrome


Hypothyroidism


Seizure disorder 


Anemia, requiring blood transfusion 


DM





PLAN OF CARE


YANCY


vent support, pulmonary toilet   


trach care 


CXR 10/9 - Diffuse airspace opacities.


Venous duplex BLE   


L CT to water seal since 10/10 , 


CXR 10/11 - Diffuse airspace opacities. 


PTX long resolved 





ABG noted : decrease AC to 20 and  repeat ABG in am 





on steroids-Hydrocortisone, decreased frequency  


off pressors;   


off  midodrine now as well 


Echo with pEF 65%  





s/p abx   


COVID-19 x3 NGT


prior persistent CONS bacteremia


Echo no evidence of vegetation 


however BCX 8/29, 9/6, 9/8, 9/19 NGT 


per ID recs keep off abx unless febrile or evidence of infection


10/5 UCX +PSA colonizer as per ID 


Legionella Ag urine, blasto Ab,  Histo Ab, HIV ab screen, JOY, ANCA neg


now SCX 10/5  + Proteus, awaiting ID recs,  colonizer 





stool OB + x1 and - x 2


prior off  Heparin given   on  SCD


s/p 3 u PRBC


GI procedures on hold given current condition  


HH stabilized  


GI prophylaxis 





creat  prior worsened due to Vanco , now resolved 


gentle IVF


avoid nephrotoxic  


correct electrolytes as needed


fup with  further nephro recs 


 


continue  levothyroxine , TSH within normal limits 


BS management with LA Levemir and SSI as needed 


seizure precautions,   


supportive care   


 


case discussed and evaluated by supervising physician











Joyce Welch NP                Oct 11, 2020 11:07

## 2020-10-11 NOTE — NUR
NURSE NOTES:

Received report from LAYTON LOCKETT, patient in bed, sleeping . Noted with trach size shiley 8 with 
settings of AC-22 TV-500 FiO2-70% tolerated well. In no apparent distress noted. On GT 
feeding Vital AF at 50cc on hold per LAYTON Lockett. Synthroid was given, will resume later. Gtube 
is intact patent and flushed well. No residual noted. Patient has thoravent on L chest 
attached to waterseal. Patient has bilateral soft wrist restraints .  Picc line on L upper 
Arm running 1/2 NS @ 50 ml/hr, intact with remaining 700ml on the bag, line is patent, 
intact and flushed well. No infiltration noted. With espinal catheter, draining color 
yellowish urine with 50ml on the bag. Safety measures in place. Will continue to monitor.

## 2020-10-11 NOTE — INTERNAL MED PROGRESS NOTE
Subjective


Date of Service:  Oct 11, 2020


Physician Name


Sanya Schultz


Attending Physician


Chano Cherry MD





Current Medications








 Medications


  (Trade)  Dose


 Ordered  Sig/Didi


 Route


 PRN Reason  Start Time


 Stop Time Status Last Admin


Dose Admin


 


 Acetaminophen


  (Tylenol)  650 mg  Q4H  PRN


 GT


 For Pain  9/23/20 17:15


 10/23/20 17:14  10/2/20 17:46





 


 Chlorhexidine


 Gluconate


  (Beatrice-Hex 2%)  1 applic  DAILY@2000


 TOPIC


   8/31/20 20:00


 11/29/20 19:59  10/10/20 20:32





 


 Clotrimazole


  (Lotrimin)  1 applic  Q12HR


 TOPIC


   8/30/20 13:00


 11/28/20 12:59  10/11/20 08:13





 


 Dextrose


  (Dextrose 50%)  25 ml  Q30M  PRN


 IV


 Hypoglycemia  8/26/20 11:30


 11/20/20 11:29   





 


 Dextrose


  (Dextrose 50%)  50 ml  Q30M  PRN


 IV


 Hypoglycemia  8/26/20 11:30


 11/20/20 11:29   





 


 Hydrocortisone


  (Solu-CORTEF)  25 mg  Q12H


 IV


   10/9/20 22:00


 1/7/21 21:59  10/11/20 10:06





 


 Insulin Aspart


  (NovoLOG)    Q6HR


 SUBQ


   9/18/20 12:00


 12/17/20 11:59  10/10/20 06:05





 


 Insulin Detemir


  (Levemir)  10 units  Q12HR


 SUBQ


   9/18/20 10:00


 12/17/20 09:59  10/11/20 08:12





 


 Levothyroxine


 Sodium


  (Synthroid)  75 mcg  DAILY@0630


 GT


   9/30/20 06:30


 10/30/20 06:29  10/11/20 05:59





 


 Loperamide HCl


  (Imodium)  2 mg  Q6H  PRN


 NG


 Diarrhea  9/16/20 10:30


 10/16/20 10:29  9/23/20 11:41





 


 Lorazepam


  (Ativan 2mg/ml


 1ml)  2 mg  Q4H  PRN


 IV


 For Anxiety  10/5/20 00:45


 10/12/20 00:44  10/5/20 03:06





 


 Pantoprazole


  (Protonix)  40 mg  EVERY 12  HOURS


 IVP


   9/28/20 21:00


 10/28/20 20:59  10/11/20 08:10





 


 Potassium Chloride


  (K-Dur)  40 meq  EVERY 12  HOURS


 GT


   9/26/20 21:00


 12/19/20 12:14  10/11/20 08:10





 


 Sodium Chloride  1,000 ml @ 


 50 mls/hr  Q20H


 IV


   10/4/20 11:00


 11/3/20 10:59  10/11/20 02:42











Allergies:  


Coded Allergies:  


     No Known Allergies (Unverified , 1/8/19)


ROS Limited/Unobtainable:  Yes


Subjective


58 YO F with Down's syndrome admitted with hypoxia.  Now sepsis and pneumonia.  

Cover for Int Med-DR Hung.  Step Down Unit





Objective





Last Vital Signs








  Date Time  Temp Pulse Resp B/P (MAP) Pulse Ox O2 Delivery O2 Flow Rate FiO2


 


10/11/20 16:00        70


 


10/11/20 16:00      Mechanical Ventilator  





      Mechanical Ventilator  





      Mechanical Ventilator  


 


10/11/20 16:00 97.7 52 22 130/86 (101) 98   











Laboratory Tests








Test


 10/10/20


17:50 10/10/20


20:34 10/10/20


23:54 10/11/20


03:00


 


POC Whole Blood Glucose


 105 MG/DL


() Pending  


 93 MG/DL


() 





 


White Blood Count


 


 


 


 6.9 K/UL


(4.8-10.8)


 


Red Blood Count


 


 


 


 2.49 M/UL


(4.20-5.40)  L


 


Hemoglobin


 


 


 


 7.8 G/DL


(12.0-16.0)  L


 


Hematocrit


 


 


 


 23.7 %


(37.0-47.0)  L


 


Mean Corpuscular Volume    95 FL (80-99)  


 


Mean Corpuscular Hemoglobin


 


 


 


 31.5 PG


(27.0-31.0)  H


 


Mean Corpuscular Hemoglobin


Concent 


 


 


 33.1 G/DL


(32.0-36.0)


 


Red Cell Distribution Width


 


 


 


 17.9 %


(11.6-14.8)  H


 


Platelet Count


 


 


 


 81 K/UL


(150-450)  L


 


Mean Platelet Volume


 


 


 


 9.4 FL


(6.5-10.1)


 


Neutrophils (%) (Auto)


 


 


 


 % (45.0-75.0)





 


Lymphocytes (%) (Auto)


 


 


 


 % (20.0-45.0)





 


Monocytes (%) (Auto)     % (1.0-10.0)  


 


Eosinophils (%) (Auto)     % (0.0-3.0)  


 


Basophils (%) (Auto)     % (0.0-2.0)  


 


Differential Total Cells


Counted 


 


 


 100  





 


Neutrophils % (Manual)    61 % (45-75)  


 


Lymphocytes % (Manual)    33 % (20-45)  


 


Monocytes % (Manual)    4 % (1-10)  


 


Eosinophils % (Manual)    2 % (0-3)  


 


Basophils % (Manual)    0 % (0-2)  


 


Band Neutrophils    0 % (0-8)  


 


Platelet Estimate    Decreased  L


 


Platelet Morphology    Normal  


 


Hypochromasia    1+  


 


Anisocytosis    1+  


 


Sodium Level


 


 


 


 142 MMOL/L


(136-145)


 


Potassium Level


 


 


 


 3.8 MMOL/L


(3.5-5.1)


 


Chloride Level


 


 


 


 112 MMOL/L


()  H


 


Carbon Dioxide Level


 


 


 


 24 MMOL/L


(21-32)


 


Anion Gap


 


 


 


 6 mmol/L


(5-15)


 


Blood Urea Nitrogen


 


 


 


 14 mg/dL


(7-18)


 


Creatinine


 


 


 


 0.5 MG/DL


(0.55-1.30)  L


 


Estimat Glomerular Filtration


Rate 


 


 


 > 60 mL/min


(>60)


 


Glucose Level


 


 


 


 114 MG/DL


()  H


 


Uric Acid


 


 


 


 3.1 MG/DL


(2.6-7.2)


 


Calcium Level


 


 


 


 7.3 MG/DL


(8.5-10.1)  L


 


Phosphorus Level


 


 


 


 2.8 MG/DL


(2.5-4.9)


 


Magnesium Level


 


 


 


 1.8 MG/DL


(1.8-2.4)


 


Total Bilirubin


 


 


 


 0.7 MG/DL


(0.2-1.0)


 


Aspartate Amino Transf


(AST/SGOT) 


 


 


 22 U/L (15-37)





 


Alanine Aminotransferase


(ALT/SGPT) 


 


 


 35 U/L (12-78)





 


Alkaline Phosphatase


 


 


 


 81 U/L


()


 


Total Protein


 


 


 


 4.6 G/DL


(6.4-8.2)  L


 


Albumin


 


 


 


 1.3 G/DL


(3.4-5.0)  L


 


Globulin    3.3 g/dL  


 


Albumin/Globulin Ratio


 


 


 


 0.4 (1.0-2.7)


L


 


Test


 10/11/20


05:06 10/11/20


07:56 10/11/20


08:08 10/11/20


11:10


 


POC Whole Blood Glucose


 123 MG/DL


()  H 


 88 MG/DL


() 109 MG/DL


()  H


 


Arterial Blood pH


 


 7.503


(7.350-7.450) 


 





 


Arterial Blood Partial


Pressure CO2 


 28.5 mmHg


(35.0-45.0)  L 


 





 


Arterial Blood Partial


Pressure O2 


 92.9 mmHg


(75.0-100.0) 


 





 


Arterial Blood HCO3


 


 21.9 mmol/L


(22.0-26.0)  L 


 





 


Arterial Blood Oxygen


Saturation 


 97.0 %


() 


 





 


Arterial Blood Base Excess  -0.8 (-2-2)    


 


Cole Test  Positive    

















Intake and Output  


 


 10/10/20 10/11/20





 19:00 07:00


 


Intake Total 770 ml 1165 ml


 


Output Total 1130 ml 1200 ml


 


Balance -360 ml -35 ml


 


  


 


Free Water 120 ml 80 ml


 


IV Total 50 ml 515 ml


 


Tube Feeding 600 ml 550 ml


 


Blood Product  20 ml


 


Output Urine Total 950 ml 1200 ml


 


Chest Tube Drainage Total 180 ml 


 


# Bowel Movements 1 2








Objective


General Appearance:  WD/WN, no apparent distress, alert


EENT:  PERRL/EOMI, normal ENT inspection


Neck:  non-tender, normal alignment, supple, normal inspection


Cardiovascular:  normal peripheral pulses, normal rate, regular rhythm, no 

gallop/murmur, no JVD


Respiratory/Chest:  Mech vent; decreased breath sounds, crackles/rales, rhonchi 

- bilaterally, expiratory wheezing


Abdomen:  normal bowel sounds, non tender, soft, no organomegaly, no mass


Extremities:  normal range of motion


Neurologic:  CNs II-XII grossly normal


Skin:  normal pigmentation, warm/dry





Assessment/Plan


Problem List:  


(1) HCAP (healthcare-associated pneumonia)


Assessment & Plan:  Strep Group G.  S/P amikacin per ID=Dr Gomes.  

Pulmonary/Critical care=DR Cherry.  COVID NEG





(2) Sepsis


Assessment & Plan:  Staph haemolyticus.  S/P amikacin per ID=Dr Gomes





(3) Down's syndrome


(4) Dysphagia


Assessment & Plan:  S/P PEG





(5) Seizure disorder


Assessment & Plan:  Continue keppra and depakote





(6) Hypothyroidism


Assessment & Plan:  Continue synthroid





(7) Acute respiratory failure


Assessment & Plan:  Pulmonary = Dr Cherry; mech vent





(8) Pneumothorax, right


Assessment & Plan:  Continue chest tube per pulmonary





(9) Cardiac arrest


Assessment & Plan:  8/26/20-see cardiology note=Sanya Dunn MD               Oct 11, 2020 17:42

## 2020-10-11 NOTE — NUR
NURSE NOTES:

Received report from LAYTON Ty.

Pt asleep and unable to make needs known.

EKG shows SB at 50 BPM.

Pt tolerating vent settings well at prescribed settings of A/C 20, Vt 500, 70% saturating 
100%.

CARLOS PICC patent and intact.

GT running Vital AF at 50 cc with 0 residual.

Valle draining well to gravity.

Bed placed in lowest and locked position.

Bed alarm on. Will continue morning.

## 2020-10-11 NOTE — SURGERY PROGRESS NOTE
Surgery Progress Note


Subjective


Procedure Performed


Tracheostomy


Additional Comments


chest tube to water seal


pending cxr


plan tube removoal soon





Objective





Last 24 Hour Vital Signs








  Date Time  Temp Pulse Resp B/P (MAP) Pulse Ox O2 Delivery O2 Flow Rate FiO2


 


10/11/20 16:00        70


 


10/11/20 16:00      Mechanical Ventilator  





      Mechanical Ventilator  





      Mechanical Ventilator  


 


10/11/20 16:00 97.7 52 22 130/86 (101) 98   


 


10/11/20 15:05  62 20     45


 


10/11/20 12:00 98.2 57 20 117/71 (86) 97   


 


10/11/20 12:00        70


 


10/11/20 12:00      Mechanical Ventilator  





      Mechanical Ventilator  





      Mechanical Ventilator  


 


10/11/20 11:40  48      


 


10/11/20 11:11  51 20     45


 


10/11/20 08:00 97.9 54 24 121/85 (97) 98   


 


10/11/20 08:00      Mechanical Ventilator  





      Mechanical Ventilator  





      Mechanical Ventilator  


 


10/11/20 08:00        70


 


10/11/20 07:44  42      


 


10/11/20 07:10  49 22     45


 


10/11/20 04:00        70


 


10/11/20 04:00      Mechanical Ventilator  





      Mechanical Ventilator  





      Mechanical Ventilator  


 


10/11/20 04:00 98.3 45 20 105/63 (77) 100   


 


10/11/20 03:48  43      


 


10/11/20 02:35  50 22     45


 


10/11/20 00:00      Mechanical Ventilator  





      Mechanical Ventilator  





      Mechanical Ventilator  


 


10/10/20 23:56 97.9 40 20 115/65 (82) 100   


 


10/10/20 23:49  39      


 


10/10/20 22:53  51 22     45


 


10/10/20 20:39 97.7 51 20 107/66 (80) 97   


 


10/10/20 20:00      Mechanical Ventilator  





      Mechanical Ventilator  





      Mechanical Ventilator  


 


10/10/20 20:00        70


 


10/10/20 19:28  51      


 


10/10/20 18:49  45 23     45








I&O











Intake and Output  


 


 10/10/20 10/11/20





 19:00 07:00


 


Intake Total 770 ml 1165 ml


 


Output Total 1130 ml 1200 ml


 


Balance -360 ml -35 ml


 


  


 


Free Water 120 ml 80 ml


 


IV Total 50 ml 515 ml


 


Tube Feeding 600 ml 550 ml


 


Blood Product  20 ml


 


Output Urine Total 950 ml 1200 ml


 


Chest Tube Drainage Total 180 ml 


 


# Bowel Movements 1 2








Cardiovascular:  RSR


Respiratory:  decreased breath sounds


Abdomen:  soft, non-tender, present bowel sounds


Extremities:  no edema, no tenderness, no cyanosis





Laboratory Tests








Test


 10/10/20


17:50 10/10/20


20:34 10/10/20


23:54 10/11/20


03:00


 


POC Whole Blood Glucose


 105 MG/DL


() Pending  


 93 MG/DL


() 





 


White Blood Count


 


 


 


 6.9 K/UL


(4.8-10.8)


 


Red Blood Count


 


 


 


 2.49 M/UL


(4.20-5.40)  L


 


Hemoglobin


 


 


 


 7.8 G/DL


(12.0-16.0)  L


 


Hematocrit


 


 


 


 23.7 %


(37.0-47.0)  L


 


Mean Corpuscular Volume    95 FL (80-99)  


 


Mean Corpuscular Hemoglobin


 


 


 


 31.5 PG


(27.0-31.0)  H


 


Mean Corpuscular Hemoglobin


Concent 


 


 


 33.1 G/DL


(32.0-36.0)


 


Red Cell Distribution Width


 


 


 


 17.9 %


(11.6-14.8)  H


 


Platelet Count


 


 


 


 81 K/UL


(150-450)  L


 


Mean Platelet Volume


 


 


 


 9.4 FL


(6.5-10.1)


 


Neutrophils (%) (Auto)


 


 


 


 % (45.0-75.0)





 


Lymphocytes (%) (Auto)


 


 


 


 % (20.0-45.0)





 


Monocytes (%) (Auto)     % (1.0-10.0)  


 


Eosinophils (%) (Auto)     % (0.0-3.0)  


 


Basophils (%) (Auto)     % (0.0-2.0)  


 


Differential Total Cells


Counted 


 


 


 100  





 


Neutrophils % (Manual)    61 % (45-75)  


 


Lymphocytes % (Manual)    33 % (20-45)  


 


Monocytes % (Manual)    4 % (1-10)  


 


Eosinophils % (Manual)    2 % (0-3)  


 


Basophils % (Manual)    0 % (0-2)  


 


Band Neutrophils    0 % (0-8)  


 


Platelet Estimate    Decreased  L


 


Platelet Morphology    Normal  


 


Hypochromasia    1+  


 


Anisocytosis    1+  


 


Sodium Level


 


 


 


 142 MMOL/L


(136-145)


 


Potassium Level


 


 


 


 3.8 MMOL/L


(3.5-5.1)


 


Chloride Level


 


 


 


 112 MMOL/L


()  H


 


Carbon Dioxide Level


 


 


 


 24 MMOL/L


(21-32)


 


Anion Gap


 


 


 


 6 mmol/L


(5-15)


 


Blood Urea Nitrogen


 


 


 


 14 mg/dL


(7-18)


 


Creatinine


 


 


 


 0.5 MG/DL


(0.55-1.30)  L


 


Estimat Glomerular Filtration


Rate 


 


 


 > 60 mL/min


(>60)


 


Glucose Level


 


 


 


 114 MG/DL


()  H


 


Uric Acid


 


 


 


 3.1 MG/DL


(2.6-7.2)


 


Calcium Level


 


 


 


 7.3 MG/DL


(8.5-10.1)  L


 


Phosphorus Level


 


 


 


 2.8 MG/DL


(2.5-4.9)


 


Magnesium Level


 


 


 


 1.8 MG/DL


(1.8-2.4)


 


Total Bilirubin


 


 


 


 0.7 MG/DL


(0.2-1.0)


 


Aspartate Amino Transf


(AST/SGOT) 


 


 


 22 U/L (15-37)





 


Alanine Aminotransferase


(ALT/SGPT) 


 


 


 35 U/L (12-78)





 


Alkaline Phosphatase


 


 


 


 81 U/L


()


 


Total Protein


 


 


 


 4.6 G/DL


(6.4-8.2)  L


 


Albumin


 


 


 


 1.3 G/DL


(3.4-5.0)  L


 


Globulin    3.3 g/dL  


 


Albumin/Globulin Ratio


 


 


 


 0.4 (1.0-2.7)


L


 


Test


 10/11/20


05:06 10/11/20


07:56 10/11/20


08:08 10/11/20


11:10


 


POC Whole Blood Glucose


 123 MG/DL


()  H 


 88 MG/DL


() 109 MG/DL


()  H


 


Arterial Blood pH


 


 7.503


(7.350-7.450) 


 





 


Arterial Blood Partial


Pressure CO2 


 28.5 mmHg


(35.0-45.0)  L 


 





 


Arterial Blood Partial


Pressure O2 


 92.9 mmHg


(75.0-100.0) 


 





 


Arterial Blood HCO3


 


 21.9 mmol/L


(22.0-26.0)  L 


 





 


Arterial Blood Oxygen


Saturation 


 97.0 %


() 


 





 


Arterial Blood Base Excess  -0.8 (-2-2)    


 


Cole Test  Positive    











Plan


Problems:  


(1) Respiratory distress


Assessment & Plan:  Respiratory insufficiency requiring prolonged ventilator sup

port.  Patient on minimal vent settings right now currently in stable but 

unfortunately not safe for extubation.  Tracheostomy is indicated recommended.  

I discussed case with pulmonology team ICU team medical teams.  Patient unable 

to make decisions and her current condition and given her history.  The care 

team has been contacted and will be reviewed for evaluation and consideration of

the tracheostomy.  If consented I think it is reasonable for tracheostomy given 

patient's current CODE STATUS care plan and goals of care.  Extubation his 

current status is potentially high risk for reintubation emergency complication.

 We will plan for tracheostomy if consent is obtained.  Thank you for let me 

participate patient's care will follow with recommendations





will plan for trach when ready


wean [id number] 





s/p trach 10?8





(2) Encephalopathy chronic


(3) Dysphagia


(4) Down's syndrome


(5) Sepsis


(6) Acute respiratory failure


(7) Pneumonia


(8) Trisomy 21, Down syndrome


(9) DAVID (acute kidney injury)


(10) Bacteremia


(11) Hypokalemia


(12) Anemia


(13) Diarrhea


(14) Hypernatremia


(15) Pneumothorax


(16) Pneumothorax, right


Assessment & Plan:  right ptx.  s/p chest tube


tube removed 10/3





left ptx tube placed 10/2


left CT to water seal 





(17) Cardiac arrest


(18) ARDS (adult respiratory distress syndrome)


(19) Septic shock


(20) HCAP (healthcare-associated pneumonia)


(21) Respiratory failure requiring intubation


(22) Seizure disorder


(23) Hypothyroidism











Jake Aranda                Oct 11, 2020 17:40

## 2020-10-12 VITALS — DIASTOLIC BLOOD PRESSURE: 58 MMHG | SYSTOLIC BLOOD PRESSURE: 109 MMHG

## 2020-10-12 VITALS — DIASTOLIC BLOOD PRESSURE: 51 MMHG | SYSTOLIC BLOOD PRESSURE: 104 MMHG

## 2020-10-12 VITALS — DIASTOLIC BLOOD PRESSURE: 64 MMHG | SYSTOLIC BLOOD PRESSURE: 110 MMHG

## 2020-10-12 VITALS — DIASTOLIC BLOOD PRESSURE: 58 MMHG | SYSTOLIC BLOOD PRESSURE: 114 MMHG

## 2020-10-12 VITALS — DIASTOLIC BLOOD PRESSURE: 61 MMHG | SYSTOLIC BLOOD PRESSURE: 105 MMHG

## 2020-10-12 VITALS — DIASTOLIC BLOOD PRESSURE: 60 MMHG | SYSTOLIC BLOOD PRESSURE: 105 MMHG

## 2020-10-12 LAB
ADD MANUAL DIFF: NO
BUN SERPL-MCNC: 14 MG/DL (ref 7–18)
CALCIUM SERPL-MCNC: 7.2 MG/DL (ref 8.5–10.1)
CHLORIDE SERPL-SCNC: 109 MMOL/L (ref 98–107)
CO2 SERPL-SCNC: 25 MMOL/L (ref 21–32)
CREAT SERPL-MCNC: 0.6 MG/DL (ref 0.55–1.3)
ERYTHROCYTE [DISTWIDTH] IN BLOOD BY AUTOMATED COUNT: 18 % (ref 11.6–14.8)
HCT VFR BLD CALC: 22.8 % (ref 37–47)
HGB BLD-MCNC: 7.5 G/DL (ref 12–16)
MCV RBC AUTO: 95 FL (ref 80–99)
PLATELET # BLD: 72 K/UL (ref 150–450)
POTASSIUM SERPL-SCNC: 3.7 MMOL/L (ref 3.5–5.1)
RBC # BLD AUTO: 2.4 M/UL (ref 4.2–5.4)
SODIUM SERPL-SCNC: 140 MMOL/L (ref 136–145)
WBC # BLD AUTO: 7.3 K/UL (ref 4.8–10.8)

## 2020-10-12 PROCEDURE — 0WPBX0Z REMOVAL OF DRAINAGE DEVICE FROM LEFT PLEURAL CAVITY, EXTERNAL APPROACH: ICD-10-PCS

## 2020-10-12 RX ADMIN — HYDROCORTISONE SODIUM SUCCINATE SCH MG: 100 INJECTION, POWDER, FOR SOLUTION INTRAMUSCULAR; INTRAVENOUS at 22:28

## 2020-10-12 RX ADMIN — INSULIN ASPART SCH UNITS: 100 INJECTION, SOLUTION INTRAVENOUS; SUBCUTANEOUS at 12:00

## 2020-10-12 RX ADMIN — INSULIN ASPART SCH UNITS: 100 INJECTION, SOLUTION INTRAVENOUS; SUBCUTANEOUS at 00:00

## 2020-10-12 RX ADMIN — PANTOPRAZOLE SODIUM SCH MG: 40 INJECTION, POWDER, FOR SOLUTION INTRAVENOUS at 20:26

## 2020-10-12 RX ADMIN — INSULIN ASPART SCH UNITS: 100 INJECTION, SOLUTION INTRAVENOUS; SUBCUTANEOUS at 05:53

## 2020-10-12 RX ADMIN — INSULIN ASPART SCH UNITS: 100 INJECTION, SOLUTION INTRAVENOUS; SUBCUTANEOUS at 18:00

## 2020-10-12 RX ADMIN — INSULIN DETEMIR SCH UNITS: 100 INJECTION, SOLUTION SUBCUTANEOUS at 22:24

## 2020-10-12 RX ADMIN — CHLORHEXIDINE GLUCONATE SCH APPLIC: 213 SOLUTION TOPICAL at 20:26

## 2020-10-12 RX ADMIN — HYDROCORTISONE SODIUM SUCCINATE SCH MG: 100 INJECTION, POWDER, FOR SOLUTION INTRAMUSCULAR; INTRAVENOUS at 09:52

## 2020-10-12 RX ADMIN — PANTOPRAZOLE SODIUM SCH MG: 40 INJECTION, POWDER, FOR SOLUTION INTRAVENOUS at 09:47

## 2020-10-12 RX ADMIN — INSULIN DETEMIR SCH UNITS: 100 INJECTION, SOLUTION SUBCUTANEOUS at 09:51

## 2020-10-12 NOTE — NEPHROLOGY PROGRESS NOTE
Assessment/Plan


Problem List:  


(1) DAVID (acute kidney injury)


(2) Respiratory failure requiring intubation


(3) Down's syndrome


(4) Seizure disorder


(5) Hypothyroidism


Assessment





Acute renal failure, likely due to hypotension


Acute respiratory distress, hypoxia


Seizure disorder


Hypothyroidism


Down syndrome


Full code











Fluid challenge with IV fluids and albumin


Midodrine for BP above 100 systolic


Check TSH level


Check


Correct level


Monitor renal parameters


Urine studies


Per orders


Plan


October 12: Due discontinuation of chest tube today.  Trach to vent.  Renal 

parameters stable.


October 11: Trach to vent.  Left chest tube to drain.  Full code.  Labs 

reviewed.  Renal parameters stable.


October 10: Trached and vent.  Still has left chest tube to drain.  No chemistry

panel today.  Continue per consultants.  Patient full code.


October 9: Patient now has trach connected to vent.  Left chest tube remains in 

place.  Patient full code.  Continue per consultants.  Labs reviewed.


October 8: Discussed with RN.  Remains on ventilator.  Labs reviewed.  Stable 

from renal standpoint of view.  Patient due for tracheostomy when clinically 

stable.


October 7: On ventilator.  Left chest tube remains.  Full code.  Labs reviewed. 

Electrolyte abnormalities addressed.  Continue per consultants.


October 6: On ventilator.  Tracheostomy postponed because patient is clinically 

unstable.  Still have left chest tube draining.  Labs reviewed.  Electrolyte 

abnormalities addressed.  Continue per consultants.


October 5: Remains intubated on ventilator.  Discussed with RN.  Unclear if the 

patient is due for tracheostomy today or not.  Apparently the patient was 

reintubated again yesterday.  Patient has left chest tube.  Electrolyte 

abnormalities addressed.


October 4: Remains intubated on ventilator.  Due for tracheostomy tomorrow.  

Left chest tube present.  Patient is full code.  Labs reviewed.  Continue as is.


October 3: Remains intubated on ventilator.  Due for tracheostomy October 5.  

Has left-sided chest tube.  Stable from renal standpoint to view.  Continue per 

consultants.


October 2: Remains intubated on ventilator.  Electrolyte abnormalities 

addressed.  Supplements ordered.


October 1: Intubated on ventilator.  Labs reviewed.  Potassium supplement given.

 Remains bradycardic.  Continue per consultants.


September 30: Labs reviewed.  Electrolyte abnormalities addressed.  Heart rate 

remains bradycardic.  Continue per cardiology.  Continue to monitor renal 

parameters and electrolytes.


September 29: Labs reviewed.  Electrolyte abnormalities addressed and replaced. 

Medication list reviewed.  Heart rate remains mid 50s.  Continue as is.


September 28: On ventilator.  Full code.  Heart rate low.  Will discontinue 

Midodrin.  Continue to rest.  Electrolytes within normal limit.  Discussed with 

RN.


September 27: Remains intubated.  Full code.  No plan for tracheostomy yet.  

Labs reviewed.  All acceptable.  Continue same management


September 26: Labs reviewed.  Discussed with RN.  Potassium and phosphorus and 

magnesium replacement ordered.  Hemoglobin 9.5.  Patient remains full code.  

Continue per consultants.


September 25: Lab reviewed.  Renal parameters stable.  Full code.  Intubated.  

Electrolyte abnormalities addressed and replacement done.


September 24: Lab reviewed.  Renal parameters stable.  Full code.  Intubated.  

Has right side chest tube.  Continue per consultants.


September 23: Lab reviewed.  Renal parameters stable.  Full code.  Has right 

chest tube.  Planning process for tracheostomy.  Defer to chest and general 

surgeon.


September 22: Labs reviewed.  Renal parameters stable.  Remains full code.  

Remains on ventilator.  Due for tracheostomy tomorrow.  Continue per 

consultants.  Patient has a right chest tube in place at this time.


September 21: Labs reviewed.  Electrolyte imbalances addressed and supplemented.

 Remains full code and on ventilator.  Continue to monitor renal parameters.  

Continue per consultants.


September 20: Labs reviewed.  Serum potassium again low today.  Potassium 

supplement IV and through GT given.  Patient remains full code and is on 

ventilator.  Continue per consultants.  Continue to monitor electrolytes and 

renal parameters.


September 19: Labs reviewed.  Abnormal electrolyte addressed.  Remains full 

code.  Remains vented.


September 18: Day 27 of hospitalization.  Full code.  Labs reviewed.  Hemoglobin

down to 7.5.  Electrolyte abnormalities addressed and corrections ordered.  

Continue to monitor renal parameters.  Continue per consultants.  Start on L

evemir for blood sugar management.  Questioning continuation of hydrocortisone?


September 17: Labs reviewed.  Potassium, phosphorus, hemoglobin, are all low.  

Potassium and phosphorus IV replacement given.  Continue to monitor electrolytes

and CBC.  Patient remains full code.


September 16: Lab reviewed.  Low phosphorus low magnesium and low potassium was 

addressed.  Hemoglobin drifting lower.  Continue per consultants.  Patient 

remains full code.


September 15: Labs reviewed.  Patient continues to be on ventilator.  D5W for 

high sodium and also potassium chloride intravenously as supplement given.  

Hemoglobin 8.4.  Continue to monitor electrolytes and renal parameters.


September 14: Labs reviewed.  Low potassium and high sodium noted.  Hemoglobin 

8.1 stable.  Aim to correct abnormal electrolyte.  Continue rest.  Will give 2 

boluses of D5W 500 cc.


September 13: Lab reviewed.  Abnormal electrolytes noted and addressed.


September 12: Labs reviewed.  Potassium supplement given.  Patient remains full 

code.  Continue per consultants.


September 11: Lab reviewed.  Electrolyte abnormalities addressed.  Continue per 

pulmonary and ID.


September 10: Lab reviewed.  Status unchanged.  Serum sodium 151 unchanged.  

Stable from renal standpoint of view.


September 9: Labs reviewed.  Status quo.  D5W 500 cc IV ordered.  Continue to 

monitor renal parameters.


September 8: Status quo.  Labs reviewed.  Overall condition unchanged.  Patient 

was transfused and hemoglobin higher.  Continue current management.  Patient 

remains full code.


September 7: Status quo.  Overall condition poor.  Very low albumin.  Edematous.

 Hypotensive.  Hemoglobin lower.  Anemia work-up ordered.  I favor transfusion 2

units of packed RBCs.  Patient remains full code.  I favor supportive care only.

 Will discuss.


September 6: Electrolyte abnormalities addressed.  Serum creatinine lower.  

Continue per current management.


September 5: Status unchanged.  Lab reviewed.  Serum potassium 2.7.  IV 

potassium chloride ordered.  Serum creatinine low at 1.6 stable.  Blood pressure

90s systolic


September 4: Status quo.  Labs reviewed.  Renal parameters stable.  Serum 

creatinine down to 1.6.  Medication list reviewed.  Continues to be on 

midodrine.  Continue per consultants.


September 3: Status quo.  Labs reviewed.  Electrolytes adjusted.  Serum 

creatinine down to 1.8.  Continue per consultants.


September 2: Status quo.  Labs reviewed.  Phosphorus supplement IV given.  Serum

creatinine 2.  Continue per consultants.


September 1: Requires less pressors.  Albumin bolus given.  1 dose of Lasix IV 

ordered as the patient severely edematous.  Patient serum albumin is very low.  

Continue per consultants.


August 31: Continues to be intubated.  Labs reviewed.  Serum creatinine 1.9 

unchanged.  Blood pressure more stable.  Off 1 of the pressors.  Continue to 

monitor renal parameters.  Continue per consultants.  Patient now on 

hydrocortisone 100 mg every 8 hours.  Will decrease IV fluid.  Normal saline 

down to 50 cc an hour.


August 30: Intubated.  Labs reviewed.  Creatinine 1.9 unchanged.  Continue same 

treatment plan.  Per consultants.  Overall poor prognosis since the patient 

remains on pressors and her pulmonary status is worsening.


August 29: Remains intubated.  Labs reviewed.  Creatinine 1.9.  Blood pressure 

systolic 90s.  Continue per consultants.


August 28: Remains intubated.  Labs reviewed.  Serum creatinine lower to 2.  

Vancomycin level lower.  Remains hypotensive on pressors.  Will increase 

midodrine to 10 mg every 8 hours.  Continue per consultants.  Continue to 

monitor renal parameters.


August 27: Patient now in ICU.  Intubated.  On pressors.  Labs reviewed.  Will 

increase midodrine.  Aim to keep blood pressure over 100 systolic.  Will give 

albumin bolus.  Will check vancomycin level which was elevated when checked 

previously on August 24.  Will monitor renal parameters.  Continue per 

consultants.





Subjective


ROS Limited/Unobtainable:  Yes





Objective


Objective





Last 24 Hour Vital Signs








  Date Time  Temp Pulse Resp B/P (MAP) Pulse Ox O2 Delivery O2 Flow Rate FiO2


 


10/12/20 12:00        45


 


10/12/20 12:00      Mechanical Ventilator  





      Mechanical Ventilator  





      Mechanical Ventilator  


 


10/12/20 12:00  57      


 


10/12/20 10:40  62 20     45


 


10/12/20 08:20  65 20     45


 


10/12/20 08:00 97.9 54 21 114/58 (76) 99   


 


10/12/20 08:00      Mechanical Ventilator  





      Mechanical Ventilator  





      Mechanical Ventilator  


 


10/12/20 08:00        45


 


10/12/20 08:00  58      


 


10/12/20 04:00 97.7 60 20 105/61 (76) 97   


 


10/12/20 04:00        70


 


10/12/20 04:00      Mechanical Ventilator  





      Mechanical Ventilator  





      Mechanical Ventilator  


 


10/12/20 03:37  64      


 


10/12/20 03:00  57 20     45


 


10/12/20 00:00      Mechanical Ventilator  





      Mechanical Ventilator  





      Mechanical Ventilator  


 


10/12/20 00:00 97.7 51 20 104/64 (77) 97   


 


10/12/20 00:00        70


 


10/11/20 23:35  47      


 


10/11/20 23:10  80 20     45


 


10/11/20 20:00  51      


 


10/11/20 20:00      Mechanical Ventilator  





      Mechanical Ventilator  





      Mechanical Ventilator  


 


10/11/20 20:00        70


 


10/11/20 20:00 97.8 64 20 138/78 (98) 98   


 


10/11/20 19:59  50 20     45


 


10/11/20 16:26  44      


 


10/11/20 16:00        70


 


10/11/20 16:00      Mechanical Ventilator  





      Mechanical Ventilator  





      Mechanical Ventilator  


 


10/11/20 16:00 97.7 52 22 130/86 (101) 98   


 


10/11/20 15:05  62 20     45

















Intake and Output  


 


 10/11/20 10/12/20





 19:00 07:00


 


Intake Total 910 ml 1040 ml


 


Output Total 1550 ml 600 ml


 


Balance -640 ml 440 ml


 


  


 


Free Water 260 ml 40 ml


 


IV Total 50 ml 500 ml


 


Tube Feeding 600 ml 500 ml


 


Output Urine Total 1400 ml 600 ml


 


Chest Tube Drainage Total 150 ml 


 


# Bowel Movements 2 








Laboratory Tests


10/11/20 17:41: POC Whole Blood Glucose 149H


10/11/20 21:01: POC Whole Blood Glucose 129H


10/12/20 00:01: POC Whole Blood Glucose 133H


10/12/20 03:30: 


White Blood Count 7.3, Red Blood Count 2.40L, Hemoglobin 7.5L, Hematocrit 22.8L,

Mean Corpuscular Volume 95, Mean Corpuscular Hemoglobin 31.3H, Mean Corpuscular 

Hemoglobin Concent 33.0, Red Cell Distribution Width 18.0H, Platelet Count 72L, 

Mean Platelet Volume 9.4, Neutrophils (%) (Auto) , Lymphocytes (%) (Auto) , 

Monocytes (%) (Auto) , Eosinophils (%) (Auto) , Basophils (%) (Auto) , Sodium 

Level 140, Potassium Level 3.7, Chloride Level 109H, Carbon Dioxide Level 25, 

Blood Urea Nitrogen 14, Creatinine 0.6, Estimat Glomerular Filtration Rate > 60,

Glucose Level 171H, Calcium Level 7.2L, Thyroid Stimulating Hormone (TSH) 2.884,

Free Thyroxine 0.94


10/12/20 05:51: POC Whole Blood Glucose 144H


10/12/20 07:30: 


Arterial Blood pH 7.444, Arterial Blood Partial Pressure CO2 36.4, Arterial 

Blood Partial Pressure O2 84.7, Arterial Blood HCO3 24.4, Arterial Blood Oxygen 

Saturation 96.1, Arterial Blood Base Excess 0.4, Cole Test Positive


10/12/20 12:18: POC Whole Blood Glucose 91


Height (Feet):  5


Height (Inches):  3.00


Weight (Pounds):  173


General Appearance:  no apparent distress


EENT:  other - Trach to vent


Respiratory/Chest:  other - Left chest tube


Abdomen:  distended











Lam Nino MD            Oct 12, 2020 13:02

## 2020-10-12 NOTE — NUR
NURSE NOTES:Handoff received from LAYTON Arceo. Patient received resting in bed, no acute 
signs of distress noted. Patient has chest tube draining to gravity, breathing unlabored 
with O2 saturation of 96, patient has Shiley 8, vent settings are: AC 20 tidal volume 500, 
FIO2 of 45% and PEEP of 0. Patient G tube feeding is paused as patient received synthroid, 
will resume feeding after 1 hour, to continue  Vital AF at 50ML/HR. Patient has left upper 
picc line running half NS at 50ml/hour. Patient has some sacral redness noted.Patient has 
side rails padded for seizure precaution, patient also on fall and aspiration precautions, 
bed in the lowest and locked position with call light within reach.  Will monitor patient 
closely and follow plan of care.

## 2020-10-12 NOTE — NUR
RESPIRATORY NOTE:

Received pt on AC 20, 500VT, 45%, no PEEP. Pt is trach-dependent w/ a Cuffed, Shiley 8 
tube. Pt is awake/disoriented. B/S sushil. rhonchi, sxn small to moderate amounts of 
thick/thin/frothy, pale-yellow secretions. Vent plugged into red outlet, ambubag at bedside. 
Pt in no apparent distress at this time. Will continue plan of care.

## 2020-10-12 NOTE — NUR
RD ASSESSMENT & RECOMMENDATIONS

SEE CARE ACTIVITY FOR COMPLETE ASSESSMENT



DAILY ESTIMATED NEEDS:

Needs based on CRITICAL CARE, 50kg  

22-28  kcals/kg 

1184-8035  total kcals

1.25-2  g protein/kg

  g total protein 

25-30  mL/kg

6922-4503  total fluid mLs



NUTRITION DIAGNOSIS:

Swallowing difficulty r/t dysphagia as evidenced by pt w/ Downs Syndrome,

PEG dep for all nutritional needs, now intubated, off pressor support

pending trach placement.



CURRENT TF:Vital AF 1.2 @ 50ml/hr x 22 hrs (On Synthroid QD)- held for procedure 





ENTERAL NUTRITION RECOMMENDATIONS:

Glucerna 1.2 @ 50ml/hr x 22 hrs  to provide 1100ml, 1320 kcal, 66g prot, 886ml free wa ter 



* Rec TF change to Glucerna 1.2 for carb control formula. Start @30ml/hr,

advance as tolerated to goal.

* Hold 1 hr before and after Synthroid med

* HOB over 30 degrees/ water flush per MD





ADDITIONAL RECOMMENDATIONS:

1) Ht of 61 inches per SNF; recalibrate bed scale for accurate CBW  

2) Add NISS: BGs elevated on Solucortef-> NOW ON NISS + LEVEMIR 

3) Monitor hemodynamic stability: OFF PRESSOR SUPPORT 

4) Wound healing: add Vit C 250mg QD + continue Marcio BID 

5) Monitor lytes, replete as needed  

6) Probiotics for diarrhea -> none noted 

.

## 2020-10-12 NOTE — DIAGNOSTIC IMAGING REPORT
Indication: Shortness of breath

 

Technique: One view of the chest

 

Comparison: 10/11/2020

 

Findings: Left chest vent catheter, tracheostomy, left arm PICC, bilateral

interstitial and airspace infiltrates persist, unchanged

 

Impression: .  Unchanged, over one day, findings as above.

## 2020-10-12 NOTE — INTERNAL MED PROGRESS NOTE
Subjective


Date of Service:  Oct 12, 2020


Physician Name


Sanya Schultz


Attending Physician


Chano Cherry MD





Current Medications








 Medications


  (Trade)  Dose


 Ordered  Sig/Didi


 Route


 PRN Reason  Start Time


 Stop Time Status Last Admin


Dose Admin


 


 Acetaminophen


  (Tylenol)  650 mg  Q4H  PRN


 GT


 For Pain  9/23/20 17:15


 10/23/20 17:14  10/2/20 17:46





 


 Chlorhexidine


 Gluconate


  (Beatrice-Hex 2%)  1 applic  DAILY@2000


 TOPIC


   8/31/20 20:00


 11/29/20 19:59  10/11/20 20:57





 


 Clotrimazole


  (Lotrimin)  1 applic  Q12HR


 TOPIC


   8/30/20 13:00


 11/28/20 12:59  10/12/20 09:52





 


 Dextrose


  (Dextrose 50%)  25 ml  Q30M  PRN


 IV


 Hypoglycemia  8/26/20 11:30


 11/20/20 11:29   





 


 Dextrose


  (Dextrose 50%)  50 ml  Q30M  PRN


 IV


 Hypoglycemia  8/26/20 11:30


 11/20/20 11:29   





 


 Hydrocortisone


  (Solu-CORTEF)  25 mg  Q12H


 IV


   10/9/20 22:00


 1/7/21 21:59  10/12/20 09:52





 


 Insulin Aspart


  (NovoLOG)    Q6HR


 SUBQ


   9/18/20 12:00


 12/17/20 11:59  10/12/20 05:53





 


 Insulin Detemir


  (Levemir)  10 units  Q12HR


 SUBQ


   9/18/20 10:00


 12/17/20 09:59  10/12/20 09:51





 


 Levothyroxine


 Sodium


  (Synthroid)  75 mcg  DAILY@0630


 GT


   9/30/20 06:30


 10/30/20 06:29  10/12/20 06:26





 


 Loperamide HCl


  (Imodium)  2 mg  Q6H  PRN


 NG


 Diarrhea  9/16/20 10:30


 10/16/20 10:29  9/23/20 11:41





 


 Pantoprazole


  (Protonix)  40 mg  EVERY 12  HOURS


 IVP


   9/28/20 21:00


 10/28/20 20:59  10/12/20 09:47





 


 Potassium Chloride


  (K-Dur)  40 meq  EVERY 12  HOURS


 GT


   9/26/20 21:00


 12/19/20 12:14  10/12/20 09:46





 


 Sodium Chloride  1,000 ml @ 


 50 mls/hr  Q20H


 IV


   10/4/20 11:00


 11/3/20 10:59  10/12/20 18:37











Allergies:  


Coded Allergies:  


     No Known Allergies (Unverified , 1/8/19)


ROS Limited/Unobtainable:  Yes


Subjective


60 YO F with Down's syndrome admitted with hypoxia.  Now sepsis and pneumonia.  

Cover for Int Phi-DR Hung.  Step Down Unit





Objective





Last Vital Signs








  Date Time  Temp Pulse Resp B/P (MAP) Pulse Ox O2 Delivery O2 Flow Rate FiO2


 


10/12/20 19:04  59 21     45


 


10/12/20 16:00 98.1   104/51 (68) 99   


 


10/12/20 16:00      Mechanical Ventilator  





      Mechanical Ventilator  





      Mechanical Ventilator  











Laboratory Tests








Test


 10/11/20


21:01 10/12/20


00:01 10/12/20


03:30 10/12/20


05:51


 


POC Whole Blood Glucose


 129 MG/DL


()  H 133 MG/DL


()  H 


 144 MG/DL


()  H


 


White Blood Count


 


 


 7.3 K/UL


(4.8-10.8) 





 


Red Blood Count


 


 


 2.40 M/UL


(4.20-5.40)  L 





 


Hemoglobin


 


 


 7.5 G/DL


(12.0-16.0)  L 





 


Hematocrit


 


 


 22.8 %


(37.0-47.0)  L 





 


Mean Corpuscular Volume   95 FL (80-99)   


 


Mean Corpuscular Hemoglobin


 


 


 31.3 PG


(27.0-31.0)  H 





 


Mean Corpuscular Hemoglobin


Concent 


 


 33.0 G/DL


(32.0-36.0) 





 


Red Cell Distribution Width


 


 


 18.0 %


(11.6-14.8)  H 





 


Platelet Count


 


 


 72 K/UL


(150-450)  L 





 


Mean Platelet Volume


 


 


 9.4 FL


(6.5-10.1) 





 


Neutrophils (%) (Auto)


 


 


 % (45.0-75.0)


 





 


Lymphocytes (%) (Auto)


 


 


 % (20.0-45.0)


 





 


Monocytes (%) (Auto)    % (1.0-10.0)   


 


Eosinophils (%) (Auto)    % (0.0-3.0)   


 


Basophils (%) (Auto)    % (0.0-2.0)   


 


Sodium Level


 


 


 140 MMOL/L


(136-145) 





 


Potassium Level


 


 


 3.7 MMOL/L


(3.5-5.1) 





 


Chloride Level


 


 


 109 MMOL/L


()  H 





 


Carbon Dioxide Level


 


 


 25 MMOL/L


(21-32) 





 


Blood Urea Nitrogen


 


 


 14 mg/dL


(7-18) 





 


Creatinine


 


 


 0.6 MG/DL


(0.55-1.30) 





 


Estimat Glomerular Filtration


Rate 


 


 > 60 mL/min


(>60) 





 


Glucose Level


 


 


 171 MG/DL


()  H 





 


Calcium Level


 


 


 7.2 MG/DL


(8.5-10.1)  L 





 


Thyroid Stimulating Hormone


(TSH) 


 


 2.884 uiU/mL


(0.358-3.740) 





 


Free Thyroxine


 


 


 0.94 NG/DL


(0.76-1.46) 





 


Test


 10/12/20


07:30 10/12/20


12:18 10/12/20


18:18 





 


Arterial Blood pH


 7.444


(7.350-7.450) 


 


 





 


Arterial Blood Partial


Pressure CO2 36.4 mmHg


(35.0-45.0) 


 


 





 


Arterial Blood Partial


Pressure O2 84.7 mmHg


(75.0-100.0) 


 


 





 


Arterial Blood HCO3


 24.4 mmol/L


(22.0-26.0) 


 


 





 


Arterial Blood Oxygen


Saturation 96.1 %


() 


 


 





 


Arterial Blood Base Excess 0.4 (-2-2)     


 


Cole Test Positive     


 


POC Whole Blood Glucose


 


 91 MG/DL


() 113 MG/DL


()  H 




















Intake and Output  


 


 10/11/20 10/12/20





 19:00 07:00


 


Intake Total 910 ml 1090 ml


 


Output Total 1550 ml 600 ml


 


Balance -640 ml 490 ml


 


  


 


Free Water 260 ml 40 ml


 


IV Total 50 ml 550 ml


 


Tube Feeding 600 ml 500 ml


 


Output Urine Total 1400 ml 600 ml


 


Chest Tube Drainage Total 150 ml 


 


# Bowel Movements 2 








Objective


General Appearance:  WD/WN, no apparent distress, alert


EENT:  PERRL/EOMI, normal ENT inspection


Neck:  non-tender, normal alignment, supple, normal inspection


Cardiovascular:  normal peripheral pulses, normal rate, regular rhythm, no 

gallop/murmur, no JVD


Respiratory/Chest:  Mech vent; decreased breath sounds, crackles/rales, rhonchi 

- bilaterally, expiratory wheezing


Abdomen:  normal bowel sounds, non tender, soft, no organomegaly, no mass


Extremities:  normal range of motion


Neurologic:  CNs II-XII grossly normal


Skin:  normal pigmentation, warm/dry





Assessment/Plan


Problem List:  


(1) HCAP (healthcare-associated pneumonia)


Assessment & Plan:  Strep Group G.  S/P amikacin per ID=Dr Gomes.  Pulm

onary/Critical care=DR Cherry.  COVID NEG





(2) Sepsis


Assessment & Plan:  Staph haemolyticus.  S/P amikacin per ID=Dr Gomes





(3) Down's syndrome


(4) Dysphagia


Assessment & Plan:  S/P PEG





(5) Seizure disorder


Assessment & Plan:  Continue keppra and depakote





(6) Hypothyroidism


Assessment & Plan:  Continue synthroid





(7) Acute respiratory failure


Assessment & Plan:  Pulmonary = Dr Cherry; mech vent





(8) Pneumothorax, right


Assessment & Plan:  Continue chest tube per pulmonary





(9) Cardiac arrest


Assessment & Plan:  8/26/20-see cardiology note=Sanya Dunn MD               Oct 12, 2020 20:18

## 2020-10-12 NOTE — NUR
NURSE HAND-OFF REPORT: 



Important Events on Shift:Chest tube removed by Dr Aranda. 



Patient Status: Stable 

Diet: Vital A.F @ 50ML/HR 



Pending Orders: CXR 10/13 @ 0400

Pending Results/Labs:

Pending MD notification:



Latest Vital Signs: Temperature 98.1 , Pulse 59 , B/P 104 /51 , Respiratory Rate 21 , O2 SAT 
99 , Mechanical Ventilator, O2 Flow Rate 15.0 .  

Vital Sign Comment: 



EKG Rhythm: Sinus Bradycardia

Rhythm change?: N 

MD Notified?: Y -Dr Mehul MENDENHALL Response: No New Orders Received



Latest Momin Fall Score: 70  

Fall Risk: High Risk 

Safety Measures: Call light Within Reach, Bed Alarm Zone 1, Side Rails Side Rails x3, Bed 
position Low and Locked.

Fall Precautions: Bed alarm, bed in low and locked position. 

Yellow Socks



Report given to LAYTON Arceo.

## 2020-10-12 NOTE — OPERATIVE NOTE - PDOC
Operative Note


Operative Note


Date of Operation/Procedure:  Oct 12, 2020


Pre-op Diagnosis:


left pneumothorax


Procedure:


removal left chest tube


Post-op Diagnosis:  same as pre-op


Surgeon:  Jake Aranda MD


Specimen:  none


Complications:  none


Condition:  stable


Fluids:  none


Estimated Blood Loss:  minimal


Drains:  none


Implant(s) used?:  No


Description of Procedure


cxr noted


chest tube output minimal 


thoravent removed and dressings applied


patient tolerated well


cxr ordered











Jake Aranda                Oct 12, 2020 12:49

## 2020-10-12 NOTE — PULMONOLOGY PROGRESS NOTE
Subjective


ROS Limited/Unobtainable:  No


Allergies:  


Coded Allergies:  


     No Known Allergies (Unverified , 1/8/19)


Subjective


CT placed to water seal 10/10 


CXR  10/11 -> Diffuse airspace opacities. 


ABG  stable  on AC 20


no fevers, 


no leukocytosis 


no resp distress





Objective





Last 24 Hour Vital Signs








  Date Time  Temp Pulse Resp B/P (MAP) Pulse Ox O2 Delivery O2 Flow Rate FiO2


 


10/12/20 08:20  65 20     45


 


10/12/20 08:00 97.9 54 21 114/58 (76) 99   


 


10/12/20 08:00      Mechanical Ventilator  





      Mechanical Ventilator  





      Mechanical Ventilator  


 


10/12/20 08:00        45


 


10/12/20 08:00  58      


 


10/12/20 04:00 97.7 60 20 105/61 (76) 97   


 


10/12/20 04:00        70


 


10/12/20 04:00      Mechanical Ventilator  





      Mechanical Ventilator  





      Mechanical Ventilator  


 


10/12/20 03:37  64      


 


10/12/20 03:00  57 20     45


 


10/12/20 00:00      Mechanical Ventilator  





      Mechanical Ventilator  





      Mechanical Ventilator  


 


10/12/20 00:00 97.7 51 20 104/64 (77) 97   


 


10/12/20 00:00        70


 


10/11/20 23:35  47      


 


10/11/20 23:10  80 20     45


 


10/11/20 20:00  51      


 


10/11/20 20:00      Mechanical Ventilator  





      Mechanical Ventilator  





      Mechanical Ventilator  


 


10/11/20 20:00        70


 


10/11/20 20:00 97.8 64 20 138/78 (98) 98   


 


10/11/20 19:59  50 20     45


 


10/11/20 16:26  44      


 


10/11/20 16:00        70


 


10/11/20 16:00      Mechanical Ventilator  





      Mechanical Ventilator  





      Mechanical Ventilator  


 


10/11/20 16:00 97.7 52 22 130/86 (101) 98   


 


10/11/20 15:05  62 20     45


 


10/11/20 12:00 98.2 57 20 117/71 (86) 97   


 


10/11/20 12:00        70


 


10/11/20 12:00      Mechanical Ventilator  





      Mechanical Ventilator  





      Mechanical Ventilator  


 


10/11/20 11:40  48      

















Intake and Output  


 


 10/11/20 10/12/20





 19:00 07:00


 


Intake Total 910 ml 1040 ml


 


Output Total 1550 ml 600 ml


 


Balance -640 ml 440 ml


 


  


 


Free Water 260 ml 40 ml


 


IV Total 50 ml 500 ml


 


Tube Feeding 600 ml 500 ml


 


Output Urine Total 1400 ml 600 ml


 


Chest Tube Drainage Total 150 ml 


 


# Bowel Movements 2 








Objective


Status:   on Vent -20- 45%  no PEEP


Condition:  critical


HEENT:  atraumatic, normocephalic,  face with Down features,   


Neck: trach Shiley #8, in place, intact' secretions small amount, thin  

consistency,  clear


Lungs:  R sided CT   with serosanguineous drainage   ,   few scattered rhonchi


Heart:  SR with BB, + 1 edema BLE


Abdomen:  soft, non-tender, G tube


Decubiti:  +1 edema BLE 


Lines: LUE PICC intact


Laboratory Tests


10/11/20 17:41: POC Whole Blood Glucose 149H


10/11/20 21:01: POC Whole Blood Glucose 129H


10/12/20 00:01: POC Whole Blood Glucose 133H


10/12/20 03:30: 


White Blood Count 7.3, Red Blood Count 2.40L, Hemoglobin 7.5L, Hematocrit 22.8L,

Mean Corpuscular Volume 95, Mean Corpuscular Hemoglobin 31.3H, Mean Corpuscular 

Hemoglobin Concent 33.0, Red Cell Distribution Width 18.0H, Platelet Count 72L, 

Mean Platelet Volume 9.4, Neutrophils (%) (Auto) , Lymphocytes (%) (Auto) , 

Monocytes (%) (Auto) , Eosinophils (%) (Auto) , Basophils (%) (Auto) , Sodium 

Level 140, Potassium Level 3.7, Chloride Level 109H, Carbon Dioxide Level 25, 

Blood Urea Nitrogen 14, Creatinine 0.6, Estimat Glomerular Filtration Rate > 60,

Glucose Level 171H, Calcium Level 7.2L, Thyroid Stimulating Hormone (TSH) 2.884,

Free Thyroxine 0.94


10/12/20 05:51: POC Whole Blood Glucose 144H


10/12/20 07:30: 


Arterial Blood pH 7.444, Arterial Blood Partial Pressure CO2 36.4, Arterial 

Blood Partial Pressure O2 84.7, Arterial Blood HCO3 24.4, Arterial Blood Oxygen 

Saturation 96.1, Arterial Blood Base Excess 0.4, Cole Test Positive





Current Medications








 Medications


  (Trade)  Dose


 Ordered  Sig/Didi


 Route


 PRN Reason  Start Time


 Stop Time Status Last Admin


Dose Admin


 


 Acetaminophen


  (Tylenol)  650 mg  Q4H  PRN


 GT


 For Pain  9/23/20 17:15


 10/23/20 17:14  10/2/20 17:46





 


 Chlorhexidine


 Gluconate


  (Beatrice-Hex 2%)  1 applic  DAILY@2000


 TOPIC


   8/31/20 20:00


 11/29/20 19:59  10/11/20 20:57





 


 Clotrimazole


  (Lotrimin)  1 applic  Q12HR


 TOPIC


   8/30/20 13:00


 11/28/20 12:59  10/12/20 09:52





 


 Dextrose


  (Dextrose 50%)  25 ml  Q30M  PRN


 IV


 Hypoglycemia  8/26/20 11:30


 11/20/20 11:29   





 


 Dextrose


  (Dextrose 50%)  50 ml  Q30M  PRN


 IV


 Hypoglycemia  8/26/20 11:30


 11/20/20 11:29   





 


 Hydrocortisone


  (Solu-CORTEF)  25 mg  Q12H


 IV


   10/9/20 22:00


 1/7/21 21:59  10/12/20 09:52





 


 Insulin Aspart


  (NovoLOG)    Q6HR


 SUBQ


   9/18/20 12:00


 12/17/20 11:59  10/12/20 05:53





 


 Insulin Detemir


  (Levemir)  10 units  Q12HR


 SUBQ


   9/18/20 10:00


 12/17/20 09:59  10/12/20 09:51





 


 Levothyroxine


 Sodium


  (Synthroid)  75 mcg  DAILY@0630


 GT


   9/30/20 06:30


 10/30/20 06:29  10/12/20 06:26





 


 Loperamide HCl


  (Imodium)  2 mg  Q6H  PRN


 NG


 Diarrhea  9/16/20 10:30


 10/16/20 10:29  9/23/20 11:41





 


 Pantoprazole


  (Protonix)  40 mg  EVERY 12  HOURS


 IVP


   9/28/20 21:00


 10/28/20 20:59  10/12/20 09:47





 


 Potassium Chloride


  (K-Dur)  40 meq  EVERY 12  HOURS


 GT


   9/26/20 21:00


 12/19/20 12:14  10/12/20 09:46





 


 Sodium Chloride  1,000 ml @ 


 50 mls/hr  Q20H


 IV


   10/4/20 11:00


 11/3/20 10:59  10/11/20 23:00














Assessment/Plan


Assessment/Plan


ASSESSMENT


s/p cardiopulmonary arrest


Acute hypoxemic respiratory failure requiring intubation 


Failure to wean


s/p trach 10/8 


Sepsis with shock


Persistent CONS bacteremia


Pneumonia FELICE


Pulm edema/ ARDS


R PTX, s/p CT placement 9/21 and subsequent removal  


L PTX s/p CT placement  10/2 


DAVID due to ATN   2 to hypotension 


Down syndrome


Hypothyroidism


Seizure disorder 


Anemia, requiring blood transfusion 


DM





PLAN OF CARE


YANCY


vent support, pulmonary toilet   


trach care 


CXR 10/9 - Diffuse airspace opacities.


Venous duplex BLE   


L CT to water seal since 10/10 , 


CXR 10/11 - Diffuse airspace opacities. 


PTX long resolved 


plan for dc CT soon - per surgery 


ABG stable this am on AC 20  





on steroids-Hydrocortisone, decreased frequency  


off pressors;   


off  midodrine now as well 


Echo with pEF 65%  





s/p abx   


COVID-19 x3 NGT


prior persistent CONS bacteremia


Echo no evidence of vegetation 


however BCX 8/29, 9/6, 9/8, 9/19 NGT 


per ID recs keep off abx unless febrile or evidence of infection


10/5 UCX +PSA colonizer as per ID 


Legionella Ag urine, blasto Ab,  Histo Ab, HIV ab screen, JOY, ANCA neg


now SCX 10/5  + Proteus,   colonizer 





stool OB + x1 and - x 2


prior off  Heparin given   on  SCD


s/p 3 u PRBC


GI procedures on hold given current condition  


HH stabilized  


GI prophylaxis 





creat  prior worsened due to Vanco , now resolved 


gentle IVF


avoid nephrotoxic  


correct electrolytes as needed


fup with  further nephro recs 


 


continue  levothyroxine , TSH within normal limits 


BS management with LA Levemir and SSI as needed 


seizure precautions,   


supportive care   


 


case discussed and evaluated by supervising physician











Joyce Welch NP                Oct 12, 2020 11:23

## 2020-10-12 NOTE — GENERAL PROGRESS NOTE
Subjective


ROS Limited/Unobtainable:  No


Allergies:  


Coded Allergies:  


     No Known Allergies (Unverified , 1/8/19)





Objective





Last 24 Hour Vital Signs








  Date Time  Temp Pulse Resp B/P (MAP) Pulse Ox O2 Delivery O2 Flow Rate FiO2


 


10/12/20 08:20  65 20     45


 


10/12/20 08:00 97.9 54 21 114/58 (76) 99   


 


10/12/20 08:00        45


 


10/12/20 04:00 97.7 60 20 105/61 (76) 97   


 


10/12/20 04:00        70


 


10/12/20 04:00      Mechanical Ventilator  





      Mechanical Ventilator  





      Mechanical Ventilator  


 


10/12/20 03:37  64      


 


10/12/20 03:00  57 20     45


 


10/12/20 00:00      Mechanical Ventilator  





      Mechanical Ventilator  





      Mechanical Ventilator  


 


10/12/20 00:00 97.7 51 20 104/64 (77) 97   


 


10/12/20 00:00        70


 


10/11/20 23:35  47      


 


10/11/20 23:10  80 20     45


 


10/11/20 20:00  51      


 


10/11/20 20:00      Mechanical Ventilator  





      Mechanical Ventilator  





      Mechanical Ventilator  


 


10/11/20 20:00        70


 


10/11/20 20:00 97.8 64 20 138/78 (98) 98   


 


10/11/20 19:59  50 20     45


 


10/11/20 16:26  44      


 


10/11/20 16:00        70


 


10/11/20 16:00      Mechanical Ventilator  





      Mechanical Ventilator  





      Mechanical Ventilator  


 


10/11/20 16:00 97.7 52 22 130/86 (101) 98   


 


10/11/20 15:05  62 20     45


 


10/11/20 12:00 98.2 57 20 117/71 (86) 97   


 


10/11/20 12:00        70


 


10/11/20 12:00      Mechanical Ventilator  





      Mechanical Ventilator  





      Mechanical Ventilator  


 


10/11/20 11:40  48      


 


10/11/20 11:11  51 20     45

















Intake and Output  


 


 10/11/20 10/12/20





 19:00 07:00


 


Intake Total 910 ml 1040 ml


 


Output Total 1550 ml 600 ml


 


Balance -640 ml 440 ml


 


  


 


Free Water 260 ml 40 ml


 


IV Total 50 ml 500 ml


 


Tube Feeding 600 ml 500 ml


 


Output Urine Total 1400 ml 600 ml


 


Chest Tube Drainage Total 150 ml 


 


# Bowel Movements 2 








Laboratory Tests


10/11/20 11:10: POC Whole Blood Glucose 109H


10/11/20 17:41: POC Whole Blood Glucose 149H


10/11/20 21:01: POC Whole Blood Glucose 129H


10/12/20 00:01: POC Whole Blood Glucose 133H


10/12/20 03:30: 


White Blood Count 7.3, Red Blood Count 2.40L, Hemoglobin 7.5L, Hematocrit 22.8L,

Mean Corpuscular Volume 95, Mean Corpuscular Hemoglobin 31.3H, Mean Corpuscular 

Hemoglobin Concent 33.0, Red Cell Distribution Width 18.0H, Platelet Count 72L, 

Mean Platelet Volume 9.4, Neutrophils (%) (Auto) , Lymphocytes (%) (Auto) , 

Monocytes (%) (Auto) , Eosinophils (%) (Auto) , Basophils (%) (Auto) , Sodium 

Level 140, Potassium Level 3.7, Chloride Level 109H, Carbon Dioxide Level 25, 

Blood Urea Nitrogen 14, Creatinine 0.6, Estimat Glomerular Filtration Rate > 60,

Glucose Level 171H, Calcium Level 7.2L, Thyroid Stimulating Hormone (TSH) 2.884,

Free Thyroxine 0.94


10/12/20 05:51: POC Whole Blood Glucose 144H


10/12/20 07:30: 


Arterial Blood pH 7.444, Arterial Blood Partial Pressure CO2 36.4, Arterial 

Blood Partial Pressure O2 84.7, Arterial Blood HCO3 24.4, Arterial Blood Oxygen 

Saturation 96.1, Arterial Blood Base Excess 0.4, Cole Test Positive


Height (Feet):  5


Height (Inches):  3.00


Weight (Pounds):  173


General Appearance:  no apparent distress


EENT:  PERRL/EOMI


Neck:  supple


Cardiovascular:  normal rate


Respiratory/Chest:  decreased breath sounds


Abdomen:  normal bowel sounds, non tender, soft


Extremities:  non-tender





Assessment/Plan


Status:  stable, unchanged


Assessment/Plan:


1. History of Down syndrome.


2. Dysphagia with G-tube.


3. Seizure disorder.


4. Hypothyroidism.


5. DAVID.


6. Pneumonia.


7. Sepsis.








fu H&H


prn blood transfusion to keep HGB above 7


ppi


GTF


hold GI procedures for now











Ted Nagy MD             Oct 12, 2020 09:02

## 2020-10-12 NOTE — NUR
NURSE NOTES: Bilateral soft wrist restraints removed as patient is co-operative and no 
longer requiring restraints at this time.

## 2020-10-12 NOTE — NUR
NURSE HAND-OFF: 



Important Events on Shift: n/a

Patient Status: stable

Diet: Vital AF



Pending Orders: N

Pending Results/Labs: N

Pending MD notification: N



Latest Vital Signs: Temperature 97.7 , Pulse 60 , B/P 105 /61 , Respiratory Rate 20 , O2 SAT 
97 , Mechanical Ventilator, O2 Flow Rate 15.0 .  

Vital Sign Comment: 



Latest Momin Fall Score: 70  

Fall Risk: High Risk 

Safety Measures: Call light Within Reach, Bed Alarm Zone 1, Side Rails Side Rails x3, Bed 
position Low and Locked.

Fall Precautions: 

Yellow Socks



Report given to LAYTON Allan.

## 2020-10-12 NOTE — NUR
CASE MANAGEMENT: REVIEW





SI: ARDS . RESP FAILURE . DOWN'S SYNDROME . PNEUMOTHORAX 

TRACHEOSTOMY 10/08 

RIGHT CHEST TUBE 10/02 

T 98.4 HR 54 RR 21 /58 SAT 99% MECH VENT FIO2 45

H/H 7.5/22.8 GLUCOSE 171







IS: NS IVF @ 50ML/HR

SOLU CORTEF IV Q12HR

PROTONIX IV Q12HR









***STEP DOWN UNIT STATUS***

DCP: PATIENT IS FROM [facility]

## 2020-10-12 NOTE — DIAGNOSTIC IMAGING REPORT
EXAM:

  XR Chest, 1 View

 

CLINICAL HISTORY:

  Shortness of breath

 

TECHNIQUE:

  Frontal view of the chest.

 

COMPARISON:

  Chest x-ray dated 10/09/20

 

FINDINGS:

  Lungs:  No significant change in diffuse bilateral airspace opacities.

  Pleural space:  Unremarkable.  The costophrenic angles are sharp.  No 

visible pneumothorax.

  Heart:  Unremarkable.  No cardiomegaly.

  Mediastinum:  Unremarkable.

  Bones/joints:  Unremarkable.

  Tubes, lines and devices:  Stable positioning of the tracheostomy tube  

.Telemetry leads overlie the thorax.

 

IMPRESSION:     

  No significant change compared to the prior chest x-ray from 10/09/20, 

with diffuse bilateral airspace opacities.

## 2020-10-12 NOTE — SURGERY PROGRESS NOTE
Surgery Progress Note


Subjective


Procedure Performed


Tracheostomy


Additional Comments


cxr noted


minimal ct output


chest tube removed at bedside





Objective





Last 24 Hour Vital Signs








  Date Time  Temp Pulse Resp B/P (MAP) Pulse Ox O2 Delivery O2 Flow Rate FiO2


 


10/12/20 12:00        45


 


10/12/20 12:00      Mechanical Ventilator  





      Mechanical Ventilator  





      Mechanical Ventilator  


 


10/12/20 10:40  62 20     45


 


10/12/20 08:20  65 20     45


 


10/12/20 08:00 97.9 54 21 114/58 (76) 99   


 


10/12/20 08:00      Mechanical Ventilator  





      Mechanical Ventilator  





      Mechanical Ventilator  


 


10/12/20 08:00        45


 


10/12/20 08:00  58      


 


10/12/20 04:00 97.7 60 20 105/61 (76) 97   


 


10/12/20 04:00        70


 


10/12/20 04:00      Mechanical Ventilator  





      Mechanical Ventilator  





      Mechanical Ventilator  


 


10/12/20 03:37  64      


 


10/12/20 03:00  57 20     45


 


10/12/20 00:00      Mechanical Ventilator  





      Mechanical Ventilator  





      Mechanical Ventilator  


 


10/12/20 00:00 97.7 51 20 104/64 (77) 97   


 


10/12/20 00:00        70


 


10/11/20 23:35  47      


 


10/11/20 23:10  80 20     45


 


10/11/20 20:00  51      


 


10/11/20 20:00      Mechanical Ventilator  





      Mechanical Ventilator  





      Mechanical Ventilator  


 


10/11/20 20:00        70


 


10/11/20 20:00 97.8 64 20 138/78 (98) 98   


 


10/11/20 19:59  50 20     45


 


10/11/20 16:26  44      


 


10/11/20 16:00        70


 


10/11/20 16:00      Mechanical Ventilator  





      Mechanical Ventilator  





      Mechanical Ventilator  


 


10/11/20 16:00 97.7 52 22 130/86 (101) 98   


 


10/11/20 15:05  62 20     45








I&O











Intake and Output  


 


 10/11/20 10/12/20





 19:00 07:00


 


Intake Total 910 ml 1040 ml


 


Output Total 1550 ml 600 ml


 


Balance -640 ml 440 ml


 


  


 


Free Water 260 ml 40 ml


 


IV Total 50 ml 500 ml


 


Tube Feeding 600 ml 500 ml


 


Output Urine Total 1400 ml 600 ml


 


Chest Tube Drainage Total 150 ml 


 


# Bowel Movements 2 








Drains:  other


Cardiovascular:  RSR


Respiratory:  decreased breath sounds


Abdomen:  non-tender, present bowel sounds, non-distended


Extremities:  no tenderness, no cyanosis





Laboratory Tests








Test


 10/11/20


17:41 10/11/20


21:01 10/12/20


00:01 10/12/20


03:30


 


POC Whole Blood Glucose


 149 MG/DL


()  H 129 MG/DL


()  H 133 MG/DL


()  H 





 


White Blood Count


 


 


 


 7.3 K/UL


(4.8-10.8)


 


Red Blood Count


 


 


 


 2.40 M/UL


(4.20-5.40)  L


 


Hemoglobin


 


 


 


 7.5 G/DL


(12.0-16.0)  L


 


Hematocrit


 


 


 


 22.8 %


(37.0-47.0)  L


 


Mean Corpuscular Volume    95 FL (80-99)  


 


Mean Corpuscular Hemoglobin


 


 


 


 31.3 PG


(27.0-31.0)  H


 


Mean Corpuscular Hemoglobin


Concent 


 


 


 33.0 G/DL


(32.0-36.0)


 


Red Cell Distribution Width


 


 


 


 18.0 %


(11.6-14.8)  H


 


Platelet Count


 


 


 


 72 K/UL


(150-450)  L


 


Mean Platelet Volume


 


 


 


 9.4 FL


(6.5-10.1)


 


Neutrophils (%) (Auto)


 


 


 


 % (45.0-75.0)





 


Lymphocytes (%) (Auto)


 


 


 


 % (20.0-45.0)





 


Monocytes (%) (Auto)     % (1.0-10.0)  


 


Eosinophils (%) (Auto)     % (0.0-3.0)  


 


Basophils (%) (Auto)     % (0.0-2.0)  


 


Sodium Level


 


 


 


 140 MMOL/L


(136-145)


 


Potassium Level


 


 


 


 3.7 MMOL/L


(3.5-5.1)


 


Chloride Level


 


 


 


 109 MMOL/L


()  H


 


Carbon Dioxide Level


 


 


 


 25 MMOL/L


(21-32)


 


Blood Urea Nitrogen


 


 


 


 14 mg/dL


(7-18)


 


Creatinine


 


 


 


 0.6 MG/DL


(0.55-1.30)


 


Estimat Glomerular Filtration


Rate 


 


 


 > 60 mL/min


(>60)


 


Glucose Level


 


 


 


 171 MG/DL


()  H


 


Calcium Level


 


 


 


 7.2 MG/DL


(8.5-10.1)  L


 


Thyroid Stimulating Hormone


(TSH) 


 


 


 2.884 uiU/mL


(0.358-3.740)


 


Free Thyroxine


 


 


 


 0.94 NG/DL


(0.76-1.46)


 


Test


 10/12/20


05:51 10/12/20


07:30 10/12/20


12:18 





 


POC Whole Blood Glucose


 144 MG/DL


()  H 


 91 MG/DL


() 





 


Arterial Blood pH


 


 7.444


(7.350-7.450) 


 





 


Arterial Blood Partial


Pressure CO2 


 36.4 mmHg


(35.0-45.0) 


 





 


Arterial Blood Partial


Pressure O2 


 84.7 mmHg


(75.0-100.0) 


 





 


Arterial Blood HCO3


 


 24.4 mmol/L


(22.0-26.0) 


 





 


Arterial Blood Oxygen


Saturation 


 96.1 %


() 


 





 


Arterial Blood Base Excess  0.4 (-2-2)    


 


Cole Test  Positive    











Plan


Problems:  


(1) Respiratory distress


Assessment & Plan:  Respiratory insufficiency requiring prolonged ventilator 

support.  Patient on minimal vent settings right now currently in stable but 

unfortunately not safe for extubation.  Tracheostomy is indicated recommended.  

I discussed case with pulmonology team ICU team medical teams.  Patient unable 

to make decisions and her current condition and given her history.  The care 

team has been contacted and will be reviewed for evaluation and consideration of

the tracheostomy.  If consented I think it is reasonable for tracheostomy given 

patient's current CODE STATUS care plan and goals of care.  Extubation his 

current status is potentially high risk for reintubation emergency complication.

 We will plan for tracheostomy if consent is obtained.  Thank you for let me 

participate patient's care will follow with recommendations





will plan for trach when ready


wean [id number] 





s/p trach 10?8





(2) Encephalopathy chronic


(3) Dysphagia


(4) Down's syndrome


(5) Sepsis


(6) Acute respiratory failure


(7) Pneumonia


(8) Trisomy 21, Down syndrome


(9) DAVID (acute kidney injury)


(10) Bacteremia


(11) Hypokalemia


(12) Anemia


(13) Diarrhea


(14) Hypernatremia


(15) Pneumothorax


(16) Pneumothorax, right


Assessment & Plan:  right ptx.  s/p chest tube


tube removed 10/3





left ptx tube placed 10/2


left CT to water seal 


chest tube removed 10/12





(17) Cardiac arrest


(18) ARDS (adult respiratory distress syndrome)


(19) Septic shock


(20) HCAP (healthcare-associated pneumonia)


(21) Respiratory failure requiring intubation


(22) Seizure disorder


(23) Hypothyroidism











Jake Aranda                Oct 12, 2020 12:46

## 2020-10-12 NOTE — NUR
NURSE NOTES:

Received report from LAYTON Allan with update.

Pt observed with no cardiac or respiratory distress.

Tolerating vent settings well at prescribed rate.

0 residual g-tube running Vital AF.

Observed with no chest tube as d/c by Dr. Aranda earlier today.

No bleeding at the site noted.

Pt in stable condition. Will continue monitoring.

## 2020-10-12 NOTE — INFECTIOUS DISEASES PROG NOTE
Assessment/Plan





ASSESSMENT:


sp code blue 8/26


Septic Shock; SP


Fever, recurrent- low grade -SP


Leukocytosis; recurrent; SP


     10/5 u/a 10-25, nit neg, leuk +2;  ucx PsA (I Genta); colonizer


            sp cx P, mirabilsi (R levo); colonzier


     -9/19 Bcx Neg


   -9/9 u/a no pyuria


   -9/8 Bcx Neg(Picc line)


   -9/6 Bcx Neg


            ucx Neg


             sp cx C. parapsilopsis


  -8/26 u/a no pyuria





Pneumonia.- COVID 19 neg x3


   Acute hypoxic resp failure on VM> NRB 15l 100%; hypoxic on ABG> now VDRF 

8/26- Fio2 80% >100% 8/28> 60% 9/1 >80% 9/2   >95% 9/10 >90% 9/14 >60% 9/15


R tension Pneumothroax sp CT 9/21 


    10/9 sp trach


      10/9 CXR:   Diffuse airspace opacities.


      10/8 CXR: bilateral infiltrates are all unchanged.


      10/5 CXR: Extensive bilateral airspace opacities, right greater than left,

 consistent with multifocal infiltrate worse pulmonary edema.  This is  similar 

in appearance to the prior study. Worsening, small left pleural effusion.  This 

may be secondary to  redistribution as the right-sided pleural effusion has 

mildly improved. 


       -9/22 CXR: Interim complete reexpansion of right lung without evidence of

residual pneumothorax. Bilateral diffuse and extensive infiltrates. Markedly 

improved chest wall subcutaneous emphysema


       -9/21 CXR: Interim reexpansion of previously demonstrated right 

pneumothorax, status post large bore chest tube placement.


      -9/14 CXR: Improved aeration of both lungs.


       -9/5 CXR:Small bilateral pleural effusions with minor edema.  Stable 

edema with  mild worsening in the degree of pleural effusion on the right.


         -9/1 sp cx Neg


         8/31 CXR: Extensive bilateral interstitial and airspace disease appears

similar to the prior exam. Moderate to large bilateral pleural effusions appear 

unchanged.


   8/28 CXR: Increasing left upper lobe dense consolidation and likely 

increasing bilateral pleural fluid. Persistent diffuse dense consolidation 

elsewhere


            -8/26 sp cx normal resp lilliam


             -8/25 CXR: Increased atelectasis of the right lung, since prior 

exam of 3 days earlier. New or increased right pleural effusion. Increased left 

basilar consolidation and/or pleural fluid 


          -COVID Rapid PCR neg 8/23, 8/23, 8/26


          -8/22 spc x Group G strep


          -8/22 CXR:   Reduced lung volumes.  Patchy bilateral predominantly 

interstitial  pulmonary opacities.  Could be from edema and/or pneumonia.  There

is a broader differential.


 


        -legionella ag urine, blasto ab, Histo ab, HIV ab screen, JOY, ANCA neg





Persistent, high grade bacteremia-


     -8/22 Bcx 4/4 sets S. haemolyticus; 8/23 Bcx 3/4 S/ epi; 8/27 Bcx 1.4 S. 

warnerri; 8/29 Bcx Neg


     -2d echo: no vegetaions seen





          ua/ wbc 10-15, nit neg, leuk +1; ucx Neg





DAVID; 


 -supratherapeutic vanco levels





-Seizure disorder.


- Hypothyroidism.


- Down syndrome.





History of PEG tube placement.


[facility]








PLAN:


Monitor off abx unless febrile, increasing WBC and/or HD unstable





          9/14 SP Meropenem #18, IV Amikacin #7


          9/4/20 SP Daptomycin #11


          9/2 SP MIcafungin #7, Linezolid #5


   8/28 SP Azithromycin #7/7


   8/26 SP Ceftriaxone #2


   8/25 SP IV Vancomycin #4, Zosyn #4


   8/22 SP Cefepime x1, Flagyl x1





- Monitor CBC, BMP.


.f/u cx


- COVID neg x3


- Monitor chest x-ray.


-PEG/Trach care


-aspiration precautions








Thank you, Dr. Cherry, for allowing me to participate in the care of


this patient.  I will follow the patient with you at this


hospitalization.








Discussed with RN





Subjective


Allergies:  


Coded Allergies:  


     No Known Allergies (Unverified , 1/8/19)


afebrile


no leukocytosis





Objective





Last 24 Hour Vital Signs








  Date Time  Temp Pulse Resp B/P (MAP) Pulse Ox O2 Delivery O2 Flow Rate FiO2


 


10/12/20 12:00        45


 


10/12/20 12:00      Mechanical Ventilator  





      Mechanical Ventilator  





      Mechanical Ventilator  


 


10/12/20 12:00 98.4 60 22 105/60 (75) 95   


 


10/12/20 12:00  57      


 


10/12/20 10:40  62 20     45


 


10/12/20 08:20  65 20     45


 


10/12/20 08:00 97.9 54 21 114/58 (76) 99   


 


10/12/20 08:00      Mechanical Ventilator  





      Mechanical Ventilator  





      Mechanical Ventilator  


 


10/12/20 08:00        45


 


10/12/20 08:00  58      


 


10/12/20 04:00 97.7 60 20 105/61 (76) 97   


 


10/12/20 04:00        70


 


10/12/20 04:00      Mechanical Ventilator  





      Mechanical Ventilator  





      Mechanical Ventilator  


 


10/12/20 03:37  64      


 


10/12/20 03:00  57 20     45


 


10/12/20 00:00      Mechanical Ventilator  





      Mechanical Ventilator  





      Mechanical Ventilator  


 


10/12/20 00:00 97.7 51 20 104/64 (77) 97   


 


10/12/20 00:00        70


 


10/11/20 23:35  47      


 


10/11/20 23:10  80 20     45


 


10/11/20 20:00  51      


 


10/11/20 20:00      Mechanical Ventilator  





      Mechanical Ventilator  





      Mechanical Ventilator  


 


10/11/20 20:00        70


 


10/11/20 20:00 97.8 64 20 138/78 (98) 98   


 


10/11/20 19:59  50 20     45


 


10/11/20 16:26  44      


 


10/11/20 16:00        70


 


10/11/20 16:00      Mechanical Ventilator  





      Mechanical Ventilator  





      Mechanical Ventilator  


 


10/11/20 16:00 97.7 52 22 130/86 (101) 98   


 


10/11/20 15:05  62 20     45








Height (Feet):  5


Height (Inches):  3.00


Weight (Pounds):  173


HEENT:  . trach in place


CHEST:  Coarse breathing sounds.


HEART:  S1 and S2.


ABDOMEN:  Soft.  PEG tube in place.


SKIN: no rash





Laboratory Tests








Test


 10/11/20


17:41 10/11/20


21:01 10/12/20


00:01 10/12/20


03:30


 


POC Whole Blood Glucose


 149 MG/DL


()  H 129 MG/DL


()  H 133 MG/DL


()  H 





 


White Blood Count


 


 


 


 7.3 K/UL


(4.8-10.8)


 


Red Blood Count


 


 


 


 2.40 M/UL


(4.20-5.40)  L


 


Hemoglobin


 


 


 


 7.5 G/DL


(12.0-16.0)  L


 


Hematocrit


 


 


 


 22.8 %


(37.0-47.0)  L


 


Mean Corpuscular Volume    95 FL (80-99)  


 


Mean Corpuscular Hemoglobin


 


 


 


 31.3 PG


(27.0-31.0)  H


 


Mean Corpuscular Hemoglobin


Concent 


 


 


 33.0 G/DL


(32.0-36.0)


 


Red Cell Distribution Width


 


 


 


 18.0 %


(11.6-14.8)  H


 


Platelet Count


 


 


 


 72 K/UL


(150-450)  L


 


Mean Platelet Volume


 


 


 


 9.4 FL


(6.5-10.1)


 


Neutrophils (%) (Auto)


 


 


 


 % (45.0-75.0)





 


Lymphocytes (%) (Auto)


 


 


 


 % (20.0-45.0)





 


Monocytes (%) (Auto)     % (1.0-10.0)  


 


Eosinophils (%) (Auto)     % (0.0-3.0)  


 


Basophils (%) (Auto)     % (0.0-2.0)  


 


Sodium Level


 


 


 


 140 MMOL/L


(136-145)


 


Potassium Level


 


 


 


 3.7 MMOL/L


(3.5-5.1)


 


Chloride Level


 


 


 


 109 MMOL/L


()  H


 


Carbon Dioxide Level


 


 


 


 25 MMOL/L


(21-32)


 


Blood Urea Nitrogen


 


 


 


 14 mg/dL


(7-18)


 


Creatinine


 


 


 


 0.6 MG/DL


(0.55-1.30)


 


Estimat Glomerular Filtration


Rate 


 


 


 > 60 mL/min


(>60)


 


Glucose Level


 


 


 


 171 MG/DL


()  H


 


Calcium Level


 


 


 


 7.2 MG/DL


(8.5-10.1)  L


 


Thyroid Stimulating Hormone


(TSH) 


 


 


 2.884 uiU/mL


(0.358-3.740)


 


Free Thyroxine


 


 


 


 0.94 NG/DL


(0.76-1.46)


 


Test


 10/12/20


05:51 10/12/20


07:30 10/12/20


12:18 





 


POC Whole Blood Glucose


 144 MG/DL


()  H 


 91 MG/DL


() 





 


Arterial Blood pH


 


 7.444


(7.350-7.450) 


 





 


Arterial Blood Partial


Pressure CO2 


 36.4 mmHg


(35.0-45.0) 


 





 


Arterial Blood Partial


Pressure O2 


 84.7 mmHg


(75.0-100.0) 


 





 


Arterial Blood HCO3


 


 24.4 mmol/L


(22.0-26.0) 


 





 


Arterial Blood Oxygen


Saturation 


 96.1 %


() 


 





 


Arterial Blood Base Excess  0.4 (-2-2)    


 


Cole Test  Positive    











Current Medications








 Medications


  (Trade)  Dose


 Ordered  Sig/Didi


 Route


 PRN Reason  Start Time


 Stop Time Status Last Admin


Dose Admin


 


 Acetaminophen


  (Tylenol)  650 mg  Q4H  PRN


 GT


 For Pain  9/23/20 17:15


 10/23/20 17:14  10/2/20 17:46





 


 Chlorhexidine


 Gluconate


  (Beatrice-Hex 2%)  1 applic  DAILY@2000


 TOPIC


   8/31/20 20:00


 11/29/20 19:59  10/11/20 20:57





 


 Clotrimazole


  (Lotrimin)  1 applic  Q12HR


 TOPIC


   8/30/20 13:00


 11/28/20 12:59  10/12/20 09:52





 


 Dextrose


  (Dextrose 50%)  25 ml  Q30M  PRN


 IV


 Hypoglycemia  8/26/20 11:30


 11/20/20 11:29   





 


 Dextrose


  (Dextrose 50%)  50 ml  Q30M  PRN


 IV


 Hypoglycemia  8/26/20 11:30


 11/20/20 11:29   





 


 Hydrocortisone


  (Solu-CORTEF)  25 mg  Q12H


 IV


   10/9/20 22:00


 1/7/21 21:59  10/12/20 09:52





 


 Insulin Aspart


  (NovoLOG)    Q6HR


 SUBQ


   9/18/20 12:00


 12/17/20 11:59  10/12/20 05:53





 


 Insulin Detemir


  (Levemir)  10 units  Q12HR


 SUBQ


   9/18/20 10:00


 12/17/20 09:59  10/12/20 09:51





 


 Levothyroxine


 Sodium


  (Synthroid)  75 mcg  DAILY@0630


 GT


   9/30/20 06:30


 10/30/20 06:29  10/12/20 06:26





 


 Loperamide HCl


  (Imodium)  2 mg  Q6H  PRN


 NG


 Diarrhea  9/16/20 10:30


 10/16/20 10:29  9/23/20 11:41





 


 Pantoprazole


  (Protonix)  40 mg  EVERY 12  HOURS


 IVP


   9/28/20 21:00


 10/28/20 20:59  10/12/20 09:47





 


 Potassium Chloride


  (K-Dur)  40 meq  EVERY 12  HOURS


 GT


   9/26/20 21:00


 12/19/20 12:14  10/12/20 09:46





 


 Sodium Chloride  1,000 ml @ 


 50 mls/hr  Q20H


 IV


   10/4/20 11:00


 11/3/20 10:59  10/11/20 23:00




















Rema Gomes M.D.            Oct 12, 2020 13:40

## 2020-10-13 VITALS — DIASTOLIC BLOOD PRESSURE: 55 MMHG | SYSTOLIC BLOOD PRESSURE: 125 MMHG

## 2020-10-13 VITALS — DIASTOLIC BLOOD PRESSURE: 74 MMHG | SYSTOLIC BLOOD PRESSURE: 118 MMHG

## 2020-10-13 VITALS — DIASTOLIC BLOOD PRESSURE: 63 MMHG | SYSTOLIC BLOOD PRESSURE: 114 MMHG

## 2020-10-13 VITALS — SYSTOLIC BLOOD PRESSURE: 102 MMHG | DIASTOLIC BLOOD PRESSURE: 54 MMHG

## 2020-10-13 VITALS — DIASTOLIC BLOOD PRESSURE: 51 MMHG | SYSTOLIC BLOOD PRESSURE: 137 MMHG

## 2020-10-13 VITALS — SYSTOLIC BLOOD PRESSURE: 125 MMHG | DIASTOLIC BLOOD PRESSURE: 57 MMHG

## 2020-10-13 LAB
ADD MANUAL DIFF: YES
ALBUMIN SERPL-MCNC: 1.2 G/DL (ref 3.4–5)
ALBUMIN/GLOB SERPL: 0.3 {RATIO} (ref 1–2.7)
ALP SERPL-CCNC: 76 U/L (ref 46–116)
ALT SERPL-CCNC: 37 U/L (ref 12–78)
ANION GAP SERPL CALC-SCNC: 7 MMOL/L (ref 5–15)
AST SERPL-CCNC: 21 U/L (ref 15–37)
BILIRUB SERPL-MCNC: 0.5 MG/DL (ref 0.2–1)
BUN SERPL-MCNC: 14 MG/DL (ref 7–18)
CALCIUM SERPL-MCNC: 7.1 MG/DL (ref 8.5–10.1)
CHLORIDE SERPL-SCNC: 109 MMOL/L (ref 98–107)
CO2 SERPL-SCNC: 25 MMOL/L (ref 21–32)
CREAT SERPL-MCNC: 0.5 MG/DL (ref 0.55–1.3)
ERYTHROCYTE [DISTWIDTH] IN BLOOD BY AUTOMATED COUNT: 17.6 % (ref 11.6–14.8)
GLOBULIN SER-MCNC: 3.8 G/DL
HCT VFR BLD CALC: 22 % (ref 37–47)
HGB BLD-MCNC: 7.4 G/DL (ref 12–16)
MCV RBC AUTO: 94 FL (ref 80–99)
PHOSPHATE SERPL-MCNC: 2.6 MG/DL (ref 2.5–4.9)
PLATELET # BLD: 79 K/UL (ref 150–450)
POTASSIUM SERPL-SCNC: 3.6 MMOL/L (ref 3.5–5.1)
RBC # BLD AUTO: 2.33 M/UL (ref 4.2–5.4)
SODIUM SERPL-SCNC: 141 MMOL/L (ref 136–145)
WBC # BLD AUTO: 8.6 K/UL (ref 4.8–10.8)

## 2020-10-13 RX ADMIN — PANTOPRAZOLE SODIUM SCH MG: 40 INJECTION, POWDER, FOR SOLUTION INTRAVENOUS at 09:07

## 2020-10-13 RX ADMIN — HYDROCORTISONE SODIUM SUCCINATE SCH MG: 100 INJECTION, POWDER, FOR SOLUTION INTRAMUSCULAR; INTRAVENOUS at 10:10

## 2020-10-13 RX ADMIN — MAGNESIUM SULFATE IN DEXTROSE SCH MLS/HR: 10 INJECTION, SOLUTION INTRAVENOUS at 09:07

## 2020-10-13 RX ADMIN — HYDROCORTISONE SODIUM SUCCINATE SCH MG: 100 INJECTION, POWDER, FOR SOLUTION INTRAMUSCULAR; INTRAVENOUS at 20:27

## 2020-10-13 RX ADMIN — INSULIN ASPART SCH UNITS: 100 INJECTION, SOLUTION INTRAVENOUS; SUBCUTANEOUS at 00:00

## 2020-10-13 RX ADMIN — CHLORHEXIDINE GLUCONATE SCH APPLIC: 213 SOLUTION TOPICAL at 20:27

## 2020-10-13 RX ADMIN — INSULIN ASPART SCH UNITS: 100 INJECTION, SOLUTION INTRAVENOUS; SUBCUTANEOUS at 05:26

## 2020-10-13 RX ADMIN — MAGNESIUM SULFATE IN DEXTROSE SCH MLS/HR: 10 INJECTION, SOLUTION INTRAVENOUS at 10:09

## 2020-10-13 RX ADMIN — INSULIN DETEMIR SCH UNITS: 100 INJECTION, SOLUTION SUBCUTANEOUS at 20:38

## 2020-10-13 RX ADMIN — INSULIN ASPART SCH UNITS: 100 INJECTION, SOLUTION INTRAVENOUS; SUBCUTANEOUS at 11:06

## 2020-10-13 RX ADMIN — INSULIN ASPART SCH UNITS: 100 INJECTION, SOLUTION INTRAVENOUS; SUBCUTANEOUS at 18:00

## 2020-10-13 RX ADMIN — PANTOPRAZOLE SODIUM SCH MG: 40 INJECTION, POWDER, FOR SOLUTION INTRAVENOUS at 20:27

## 2020-10-13 RX ADMIN — INSULIN DETEMIR SCH UNITS: 100 INJECTION, SOLUTION SUBCUTANEOUS at 09:00

## 2020-10-13 NOTE — NUR
NURSE NOTES:

Received patient awake not in any acute cardio respiratory distress connected to vent with 
setting well tolerated. Hob eleveted. Suction secretion prn.  Head to toe assessment done. 
Complete bath provided. Kept clean and dry.

## 2020-10-13 NOTE — NUR
NURSE HAND-OFF REPORT: 



Important Events on Shift:

Patient Status: 

Diet: Vital AF at 50cc/hr



Pending Orders: 

Pending Results/Labs:

Pending MD notification:



Latest Vital Signs: Temperature 98.4 , Pulse 48 , B/P 118 /74 , Respiratory Rate 21 , O2 SAT 
100 , Mechanical Ventilator, O2 Flow Rate 15.0 .  

Vital Sign Comment: vital signs stable



EKG Rhythm: Sinus Bradycardia

Rhythm change?: N 

MD Notified?: N

MD Response: N



Latest Momin Fall Score: 70  

Fall Risk: High Risk 

Safety Measures: Call light Within Reach, Bed Alarm Zone 1, Side Rails Side Rails x3, Bed 
position Low and Locked.

Fall Precautions: 

Yellow Socks



Report given to Charge Nurse Idalmis TRAYLOR.

## 2020-10-13 NOTE — NUR
NURSE NOTES: 



Important Events on Shift: Received report from Idalmis LARRY RN. 

Patient Status: stable 

Diet: Vital AF @ 50ml/hr 



Pending Orders: DC planning

Pending Results/Labs:none

Pending MD notification:none 



Latest Vital Signs: Temperature 97.3 , Pulse 53 , B/P 102 /54 , Respiratory Rate 20 , O2 SAT 
100 , Mechanical Ventilator, O2 Flow Rate 15.0 .  

Vital Sign Comment: 



EKG Rhythm: Sinus Bradycardia

Rhythm change?: N 

MD Notified?: Y -Dr Mehul MENDENHALL Response: No New Orders Received



Latest Momin Fall Score: 70  

Fall Risk: High Risk 

Safety Measures: Call light Within Reach, Bed Alarm Zone 1, Side Rails Side Rails x3, Bed 
position Low and Locked.

Fall Precautions: 

Yellow Socks YES 

Bed alarm armed YES

Side rail up x 3 YES 

Bed in lowest position YES

Call light within reach YES

## 2020-10-13 NOTE — GENERAL PROGRESS NOTE
Subjective


Allergies:  


Coded Allergies:  


     No Known Allergies (Unverified , 1/8/19)





Objective





Last 24 Hour Vital Signs








  Date Time  Temp Pulse Resp B/P (MAP) Pulse Ox O2 Delivery O2 Flow Rate FiO2


 


10/13/20 04:00        45


 


10/13/20 04:00      Mechanical Ventilator  





      Mechanical Ventilator  





      Mechanical Ventilator  


 


10/13/20 04:00 97.7 97 20 125/55 (78) 99   


 


10/13/20 03:28  53      


 


10/13/20 03:14  50 20     45


 


10/13/20 00:00      Mechanical Ventilator  





      Mechanical Ventilator  





      Mechanical Ventilator  


 


10/13/20 00:00 98.1 60 20 137/51 (79) 99   


 


10/13/20 00:00        45


 


10/12/20 23:32  67      


 


10/12/20 23:10  51 20     45


 


10/12/20 20:00        45


 


10/12/20 20:00 97.9 58 21 109/58 (75) 99   


 


10/12/20 20:00      Mechanical Ventilator  





      Mechanical Ventilator  





      Mechanical Ventilator  


 


10/12/20 20:00  58      


 


10/12/20 19:06  58      


 


10/12/20 19:04  59 21     45


 


10/12/20 16:00  48      


 


10/12/20 16:00 98.1 59 20 104/51 (68) 99   


 


10/12/20 16:00      Mechanical Ventilator  





      Mechanical Ventilator  





      Mechanical Ventilator  


 


10/12/20 16:00        45


 


10/12/20 14:31  65 20     45


 


10/12/20 12:00        45


 


10/12/20 12:00      Mechanical Ventilator  





      Mechanical Ventilator  





      Mechanical Ventilator  


 


10/12/20 12:00 98.4 60 22 105/60 (75) 95   


 


10/12/20 12:00  57      


 


10/12/20 10:40  62 20     45


 


10/12/20 08:20  65 20     45


 


10/12/20 08:00 97.9 54 21 114/58 (76) 99   


 


10/12/20 08:00      Mechanical Ventilator  





      Mechanical Ventilator  





      Mechanical Ventilator  


 


10/12/20 08:00        45


 


10/12/20 08:00  58      

















Intake and Output  


 


 10/12/20 10/13/20





 19:00 07:00


 


Intake Total 1309.2 ml 1200 ml


 


Output Total 750 ml 


 


Balance 559.2 ml 1200 ml


 


  


 


Free Water 140 ml 100 ml


 


IV Total 569.2 ml 550 ml


 


Tube Feeding 600 ml 550 ml


 


Output Urine Total 750 ml 








Laboratory Tests


10/12/20 07:30: 


Arterial Blood pH 7.444, Arterial Blood Partial Pressure CO2 36.4, Arterial 

Blood Partial Pressure O2 84.7, Arterial Blood HCO3 24.4, Arterial Blood Oxygen 

Saturation 96.1, Arterial Blood Base Excess 0.4, Cole Test Positive


10/12/20 12:18: POC Whole Blood Glucose 91


10/12/20 18:18: POC Whole Blood Glucose 113H


10/12/20 22:20: POC Whole Blood Glucose [Pending]


10/13/20 00:37: POC Whole Blood Glucose 111H


10/13/20 04:30: 


White Blood Count 8.6, Red Blood Count 2.33L, Hemoglobin 7.4L, Hematocrit 22.0L,

 Mean Corpuscular Volume 94, Mean Corpuscular Hemoglobin 31.8H, Mean Corpuscular

 Hemoglobin Concent 33.7, Red Cell Distribution Width 17.6H, Platelet Count 79L,

 Mean Platelet Volume 9.9, Neutrophils (%) (Auto) , Lymphocytes (%) (Auto) , 

Monocytes (%) (Auto) , Eosinophils (%) (Auto) , Basophils (%) (Auto) , 

Neutrophils % (Manual) [Pending], Lymphocytes % (Manual) [Pending], Platelet 

Estimate [Pending], Platelet Morphology [Pending], Sodium Level 141, Potassium 

Level 3.6, Chloride Level 109H, Carbon Dioxide Level 25, Anion Gap 7, Blood Urea

 Nitrogen 14, Creatinine 0.5L, Estimat Glomerular Filtration Rate > 60, Glucose 

Level 141H, Calcium Level 7.1L, Phosphorus Level 2.6, Magnesium Level 1.5L, 

Total Bilirubin 0.5, Aspartate Amino Transf (AST/SGOT) 21, Alanine 

Aminotransferase (ALT/SGPT) 37, Alkaline Phosphatase 76, Total Protein 5.0L, 

Albumin 1.2L, Globulin 3.8, Albumin/Globulin Ratio 0.3L


Height (Feet):  5


Height (Inches):  3.00


Weight (Pounds):  173


General Appearance:  no apparent distress


EENT:  normal ENT inspection


Neck:  supple


Respiratory/Chest:  decreased breath sounds


Abdomen:  hypoactive bowel sounds


Extremities:  non-tender





Assessment/Plan


Status:  stable, unchanged


Assessment/Plan:


1. History of Down syndrome.


2. Dysphagia with G-tube.


3. Seizure disorder.


4. Hypothyroidism.


5. DAVID.


6. Pneumonia.


7. Sepsis.








fu H&H


prn blood transfusion to keep HGB above 7


ppi


GTF


hold GI procedures for now











Ted Nagy MD             Oct 13, 2020 06:18

## 2020-10-13 NOTE — NUR
NURSE HAND-OFF REPORT: 



Important Events on Shift: n/a

Patient Status: stable

Diet: Vital AF



Pending Orders: N

Pending Results/Labs: CXR

Pending MD notification: N



Latest Vital Signs: Temperature 97.7 , Pulse 97 , B/P 125 /55 , Respiratory Rate 20 , O2 SAT 
99 , Mechanical Ventilator, O2 Flow Rate 15.0 .  

Vital Sign Comment: Jose Daniel



EKG Rhythm: Sinus Bradycardia

Rhythm change?: N 

MD Notified?: Y -Dr Mehul MENDENHALL Response: No New Orders Received



Latest Momin Fall Score: 70  

Fall Risk: High Risk 

Safety Measures: Call light Within Reach, Bed Alarm Zone 1, Side Rails Side Rails x3, Bed 
position Low and Locked.

Fall Precautions: 

Yellow Socks



Report given to LAYTON José.

## 2020-10-13 NOTE — NUR
*-*DISCHARGE PLANNING*-*



PATIENT HAS BEEN REFERRED BACK TO:



[facility] 

 P: [phone]

S/W CINDI, WILL CALL BACK WITH ROOM NUMBER

## 2020-10-13 NOTE — NUR
NURSE NOTES:

Received patient from Melany TRAYLOR. Patient is awake, alert and oriented x1-x2. Sinus 
Bradycardia on the heart monitor, HR 48. Receiving oxygen via trach to vent, vent settings: 
AC 20, , FiO2 70% PEEP 0. IV site is Left upper arm PICC receiving 1/2 NS at 50cc/hr. 
G-tube is intact and receiving Vital AF at 50cc/hr. Valle catheter is intact and draining.  
Valle catheter is intact and draining. Bed is locked, placed in lowest position, side rails 
up x3, bed alarm on, head of bed elevated. Will continue to monitor.

## 2020-10-13 NOTE — PULMONOLGY CRITICAL CARE NOTE
Critical Care - Asmt/Plan


Problems:  


(1) Acute respiratory failure


(2) Pneumothorax


(3) Bacteremia


(4) Pneumonia


(5) Sepsis


(6) HCAP (healthcare-associated pneumonia)


(7) Seizure disorder


(8) Down's syndrome


(9) Trisomy 21, Down syndrome


Respiratory:  monitor respiratory rate, adjust FIO2


Cardiac:  continue to monitor HR/BP


Renal:  F/U I&O, decrease IV fluid


Infectious Disease:  check cultures, other


Gastrointestinal:  continue feedings/current rate


Endocrine:  monitor blood sugar, other - continue same dose of steroids


Hematologic:  monitor H/H


Affect:  PRN ativan


Prophylaxis:  Heparin


Notes Reviewed:  internist, cardio, renal, ID


Discussed with:  nurses, consultants, 





Critical Care - Objective





Last 24 Hour Vital Signs








  Date Time  Temp Pulse Resp B/P (MAP) Pulse Ox O2 Delivery O2 Flow Rate FiO2


 


10/13/20 12:00  47      


 


10/13/20 10:53  54 20     45


 


10/13/20 08:00      Mechanical Ventilator  





      Mechanical Ventilator  





      Mechanical Ventilator  


 


10/13/20 08:00 98.4 48 21 118/74 (89) 100   


 


10/13/20 08:00        45


 


10/13/20 08:00  45      


 


10/13/20 07:02  52 20     45


 


10/13/20 04:00        45


 


10/13/20 04:00      Mechanical Ventilator  





      Mechanical Ventilator  





      Mechanical Ventilator  


 


10/13/20 04:00 97.7 97 20 125/55 (78) 99   


 


10/13/20 03:28  53      


 


10/13/20 03:14  50 20     45


 


10/13/20 00:00      Mechanical Ventilator  





      Mechanical Ventilator  





      Mechanical Ventilator  


 


10/13/20 00:00 98.1 60 20 137/51 (79) 99   


 


10/13/20 00:00        45


 


10/12/20 23:32  67      


 


10/12/20 23:10  51 20     45


 


10/12/20 20:00        45


 


10/12/20 20:00 97.9 58 21 109/58 (75) 99   


 


10/12/20 20:00      Mechanical Ventilator  





      Mechanical Ventilator  





      Mechanical Ventilator  


 


10/12/20 20:00  58      


 


10/12/20 19:06  58      


 


10/12/20 19:04  59 21     45


 


10/12/20 16:00  48      


 


10/12/20 16:00 98.1 59 20 104/51 (68) 99   


 


10/12/20 16:00      Mechanical Ventilator  





      Mechanical Ventilator  





      Mechanical Ventilator  


 


10/12/20 16:00        45


 


10/12/20 14:31  65 20     45








Status:  sedated, somnolent


Condition:  critical


HEENT:  atraumatic, normocephalic


Neck:  full ROM


Lungs:  clear, chest wall tender


Heart:  HR/BP stable


Abdomen:  soft


Extremities:  no C/C/E


Accucheck:  89





Critical Care - Subjective


ROS Limited/Unobtainable:  Yes


Interval Events:


looks comfortable


Condition:  critical, improving


FI02:  45


Vent Support Breath Rate:  20


Vent Support Mode:  AC


Vent Tidal Volume:  500


Sputum Amount:  Moderate


PEEP:  0.0


PIP:  28


Tube Feeding Amount:  50


I&O:











Intake and Output  


 


 10/12/20 10/13/20





 19:00 07:00


 


Intake Total 1309.2 ml 1250 ml


 


Output Total 750 ml 


 


Balance 559.2 ml 1250 ml


 


  


 


Free Water 140 ml 100 ml


 


IV Total 569.2 ml 600 ml


 


Tube Feeding 600 ml 550 ml


 


Output Urine Total 750 ml 








CXR:


extensive interstitial infiltrate.


ET-Tube:  7.5


ET Position:  23


Labs:





Laboratory Tests








Test


 10/12/20


18:18 10/12/20


22:20 10/13/20


00:37 10/13/20


04:30


 


POC Whole Blood Glucose


 113 MG/DL


()  H Pending  


 111 MG/DL


()  H 





 


White Blood Count


 


 


 


 8.6 K/UL


(4.8-10.8)


 


Red Blood Count


 


 


 


 2.33 M/UL


(4.20-5.40)  L


 


Hemoglobin


 


 


 


 7.4 G/DL


(12.0-16.0)  L


 


Hematocrit


 


 


 


 22.0 %


(37.0-47.0)  L


 


Mean Corpuscular Volume    94 FL (80-99)  


 


Mean Corpuscular Hemoglobin


 


 


 


 31.8 PG


(27.0-31.0)  H


 


Mean Corpuscular Hemoglobin


Concent 


 


 


 33.7 G/DL


(32.0-36.0)


 


Red Cell Distribution Width


 


 


 


 17.6 %


(11.6-14.8)  H


 


Platelet Count


 


 


 


 79 K/UL


(150-450)  L


 


Mean Platelet Volume


 


 


 


 9.9 FL


(6.5-10.1)


 


Neutrophils (%) (Auto)


 


 


 


 % (45.0-75.0)





 


Lymphocytes (%) (Auto)


 


 


 


 % (20.0-45.0)





 


Monocytes (%) (Auto)     % (1.0-10.0)  


 


Eosinophils (%) (Auto)     % (0.0-3.0)  


 


Basophils (%) (Auto)     % (0.0-2.0)  


 


Differential Total Cells


Counted 


 


 


 100  





 


Neutrophils % (Manual)    69 % (45-75)  


 


Lymphocytes % (Manual)    23 % (20-45)  


 


Monocytes % (Manual)    5 % (1-10)  


 


Eosinophils % (Manual)    0 % (0-3)  


 


Basophils % (Manual)    0 % (0-2)  


 


Band Neutrophils    3 % (0-8)  


 


Platelet Estimate    Decreased  L


 


Platelet Morphology    Normal  


 


Anisocytosis    2+  


 


Sodium Level


 


 


 


 141 MMOL/L


(136-145)


 


Potassium Level


 


 


 


 3.6 MMOL/L


(3.5-5.1)


 


Chloride Level


 


 


 


 109 MMOL/L


()  H


 


Carbon Dioxide Level


 


 


 


 25 MMOL/L


(21-32)


 


Anion Gap


 


 


 


 7 mmol/L


(5-15)


 


Blood Urea Nitrogen


 


 


 


 14 mg/dL


(7-18)


 


Creatinine


 


 


 


 0.5 MG/DL


(0.55-1.30)  L


 


Estimat Glomerular Filtration


Rate 


 


 


 > 60 mL/min


(>60)


 


Glucose Level


 


 


 


 141 MG/DL


()  H


 


Calcium Level


 


 


 


 7.1 MG/DL


(8.5-10.1)  L


 


Phosphorus Level


 


 


 


 2.6 MG/DL


(2.5-4.9)


 


Magnesium Level


 


 


 


 1.5 MG/DL


(1.8-2.4)  L


 


Total Bilirubin


 


 


 


 0.5 MG/DL


(0.2-1.0)


 


Aspartate Amino Transf


(AST/SGOT) 


 


 


 21 U/L (15-37)





 


Alanine Aminotransferase


(ALT/SGPT) 


 


 


 37 U/L (12-78)





 


Alkaline Phosphatase


 


 


 


 76 U/L


()


 


Total Protein


 


 


 


 5.0 G/DL


(6.4-8.2)  L


 


Albumin


 


 


 


 1.2 G/DL


(3.4-5.0)  L


 


Globulin    3.8 g/dL  


 


Albumin/Globulin Ratio


 


 


 


 0.3 (1.0-2.7)


L


 


Test


 10/13/20


11:04 


 


 





 


POC Whole Blood Glucose


 89 MG/DL


() 


 


 




















Chano Cherry MD              Oct 13, 2020 12:27

## 2020-10-13 NOTE — SURGERY PROGRESS NOTE
Surgery Progress Note


Subjective


Procedure Performed


removal left chest tube


Additional Comments


thoravent out


stable


since


cxr noted


labs stable





Objective





Last 24 Hour Vital Signs








  Date Time  Temp Pulse Resp B/P (MAP) Pulse Ox O2 Delivery O2 Flow Rate FiO2


 


10/13/20 08:00      Mechanical Ventilator  





      Mechanical Ventilator  





      Mechanical Ventilator  


 


10/13/20 08:00 98.4 48 21 118/74 (89) 100   


 


10/13/20 08:00        45


 


10/13/20 08:00  45      


 


10/13/20 07:02  52 20     45


 


10/13/20 04:00        45


 


10/13/20 04:00      Mechanical Ventilator  





      Mechanical Ventilator  





      Mechanical Ventilator  


 


10/13/20 04:00 97.7 97 20 125/55 (78) 99   


 


10/13/20 03:28  53      


 


10/13/20 03:14  50 20     45


 


10/13/20 00:00      Mechanical Ventilator  





      Mechanical Ventilator  





      Mechanical Ventilator  


 


10/13/20 00:00 98.1 60 20 137/51 (79) 99   


 


10/13/20 00:00        45


 


10/12/20 23:32  67      


 


10/12/20 23:10  51 20     45


 


10/12/20 20:00        45


 


10/12/20 20:00 97.9 58 21 109/58 (75) 99   


 


10/12/20 20:00      Mechanical Ventilator  





      Mechanical Ventilator  





      Mechanical Ventilator  


 


10/12/20 20:00  58      


 


10/12/20 19:06  58      


 


10/12/20 19:04  59 21     45


 


10/12/20 16:00  48      


 


10/12/20 16:00 98.1 59 20 104/51 (68) 99   


 


10/12/20 16:00      Mechanical Ventilator  





      Mechanical Ventilator  





      Mechanical Ventilator  


 


10/12/20 16:00        45


 


10/12/20 14:31  65 20     45


 


10/12/20 12:00        45


 


10/12/20 12:00      Mechanical Ventilator  





      Mechanical Ventilator  





      Mechanical Ventilator  


 


10/12/20 12:00 98.4 60 22 105/60 (75) 95   


 


10/12/20 12:00  57      








I&O











Intake and Output  


 


 10/12/20 10/13/20





 19:00 07:00


 


Intake Total 1309.2 ml 1250 ml


 


Output Total 750 ml 


 


Balance 559.2 ml 1250 ml


 


  


 


Free Water 140 ml 100 ml


 


IV Total 569.2 ml 600 ml


 


Tube Feeding 600 ml 550 ml


 


Output Urine Total 750 ml 








Dressing:  saturated


Cardiovascular:  RSR


Respiratory:  decreased breath sounds


Abdomen:  soft, non-tender, present bowel sounds


Extremities:  no tenderness, no cyanosis





Laboratory Tests








Test


 10/12/20


12:18 10/12/20


18:18 10/12/20


22:20 10/13/20


00:37


 


POC Whole Blood Glucose


 91 MG/DL


() 113 MG/DL


()  H Pending  


 111 MG/DL


()  H


 


Test


 10/13/20


04:30 


 


 





 


White Blood Count


 8.6 K/UL


(4.8-10.8) 


 


 





 


Red Blood Count


 2.33 M/UL


(4.20-5.40)  L 


 


 





 


Hemoglobin


 7.4 G/DL


(12.0-16.0)  L 


 


 





 


Hematocrit


 22.0 %


(37.0-47.0)  L 


 


 





 


Mean Corpuscular Volume 94 FL (80-99)     


 


Mean Corpuscular Hemoglobin


 31.8 PG


(27.0-31.0)  H 


 


 





 


Mean Corpuscular Hemoglobin


Concent 33.7 G/DL


(32.0-36.0) 


 


 





 


Red Cell Distribution Width


 17.6 %


(11.6-14.8)  H 


 


 





 


Platelet Count


 79 K/UL


(150-450)  L 


 


 





 


Mean Platelet Volume


 9.9 FL


(6.5-10.1) 


 


 





 


Neutrophils (%) (Auto)


 % (45.0-75.0)


 


 


 





 


Lymphocytes (%) (Auto)


 % (20.0-45.0)


 


 


 





 


Monocytes (%) (Auto)  % (1.0-10.0)     


 


Eosinophils (%) (Auto)  % (0.0-3.0)     


 


Basophils (%) (Auto)  % (0.0-2.0)     


 


Differential Total Cells


Counted 100  


 


 


 





 


Neutrophils % (Manual) 69 % (45-75)     


 


Lymphocytes % (Manual) 23 % (20-45)     


 


Monocytes % (Manual) 5 % (1-10)     


 


Eosinophils % (Manual) 0 % (0-3)     


 


Basophils % (Manual) 0 % (0-2)     


 


Band Neutrophils 3 % (0-8)     


 


Platelet Estimate Decreased  L   


 


Platelet Morphology Normal     


 


Anisocytosis 2+     


 


Sodium Level


 141 MMOL/L


(136-145) 


 


 





 


Potassium Level


 3.6 MMOL/L


(3.5-5.1) 


 


 





 


Chloride Level


 109 MMOL/L


()  H 


 


 





 


Carbon Dioxide Level


 25 MMOL/L


(21-32) 


 


 





 


Anion Gap


 7 mmol/L


(5-15) 


 


 





 


Blood Urea Nitrogen


 14 mg/dL


(7-18) 


 


 





 


Creatinine


 0.5 MG/DL


(0.55-1.30)  L 


 


 





 


Estimat Glomerular Filtration


Rate > 60 mL/min


(>60) 


 


 





 


Glucose Level


 141 MG/DL


()  H 


 


 





 


Calcium Level


 7.1 MG/DL


(8.5-10.1)  L 


 


 





 


Phosphorus Level


 2.6 MG/DL


(2.5-4.9) 


 


 





 


Magnesium Level


 1.5 MG/DL


(1.8-2.4)  L 


 


 





 


Total Bilirubin


 0.5 MG/DL


(0.2-1.0) 


 


 





 


Aspartate Amino Transf


(AST/SGOT) 21 U/L (15-37)


 


 


 





 


Alanine Aminotransferase


(ALT/SGPT) 37 U/L (12-78)


 


 


 





 


Alkaline Phosphatase


 76 U/L


() 


 


 





 


Total Protein


 5.0 G/DL


(6.4-8.2)  L 


 


 





 


Albumin


 1.2 G/DL


(3.4-5.0)  L 


 


 





 


Globulin 3.8 g/dL     


 


Albumin/Globulin Ratio


 0.3 (1.0-2.7)


L 


 


 














Plan


Problems:  


(1) Respiratory distress


Assessment & Plan:  Respiratory insufficiency requiring prolonged ventilator 

support.  Patient on minimal vent settings right now currently in stable but 

unfortunately not safe for extubation.  Tracheostomy is indicated recommended.  

I discussed case with pulmonology team ICU team medical teams.  Patient unable 

to make decisions and her current condition and given her history.  The care 

team has been contacted and will be reviewed for evaluation and consideration of

the tracheostomy.  If consented I think it is reasonable for tracheostomy given 

patient's current CODE STATUS care plan and goals of care.  Extubation his 

current status is potentially high risk for reintubation emergency complication.

 We will plan for tracheostomy if consent is obtained.  Thank you for let me 

participate patient's care will follow with recommendations





will plan for trach when ready


wean [id number] 





s/p trach 10?8





(2) Encephalopathy chronic


(3) Dysphagia


(4) Down's syndrome


(5) Sepsis


(6) Acute respiratory failure


(7) Pneumonia


(8) Trisomy 21, Down syndrome


(9) DAVID (acute kidney injury)


(10) Bacteremia


(11) Hypokalemia


(12) Anemia


(13) Diarrhea


(14) Hypernatremia


(15) Pneumothorax


(16) Pneumothorax, right


Assessment & Plan:  right ptx.  s/p chest tube


tube removed 10/3





left ptx tube placed 10/2


left CT to water seal 


chest tube removed 10/12





(17) Cardiac arrest


(18) ARDS (adult respiratory distress syndrome)


(19) Septic shock


(20) HCAP (healthcare-associated pneumonia)


(21) Respiratory failure requiring intubation


(22) Seizure disorder


(23) Hypothyroidism











Jake Aranda                Oct 13, 2020 11:04

## 2020-10-13 NOTE — INTERNAL MED PROGRESS NOTE
Subjective


Date of Service:  Oct 13, 2020


Physician Name


Sanya Schultz


Attending Physician


Chano Cherry MD





Current Medications








 Medications


  (Trade)  Dose


 Ordered  Sig/Didi


 Route


 PRN Reason  Start Time


 Stop Time Status Last Admin


Dose Admin


 


 Acetaminophen


  (Tylenol)  650 mg  Q4H  PRN


 GT


 For Pain  9/23/20 17:15


 10/23/20 17:14  10/2/20 17:46





 


 Chlorhexidine


 Gluconate


  (Beatrice-Hex 2%)  1 applic  DAILY@2000


 TOPIC


   8/31/20 20:00


 11/29/20 19:59  10/12/20 20:26





 


 Clotrimazole


  (Lotrimin)  1 applic  Q12HR


 TOPIC


   8/30/20 13:00


 11/28/20 12:59  10/13/20 09:07





 


 Dextrose


  (Dextrose 50%)  25 ml  Q30M  PRN


 IV


 Hypoglycemia  8/26/20 11:30


 11/20/20 11:29   





 


 Dextrose


  (Dextrose 50%)  50 ml  Q30M  PRN


 IV


 Hypoglycemia  8/26/20 11:30


 11/20/20 11:29   





 


 Hydrocortisone


  (Solu-CORTEF)  25 mg  Q12H


 IV


   10/9/20 22:00


 1/7/21 21:59  10/13/20 10:10





 


 Insulin Aspart


  (NovoLOG)    Q6HR


 SUBQ


   9/18/20 12:00


 12/17/20 11:59  10/12/20 05:53





 


 Insulin Detemir


  (Levemir)  10 units  Q12HR


 SUBQ


   9/18/20 10:00


 12/17/20 09:59  10/12/20 22:24





 


 Levothyroxine


 Sodium


  (Synthroid)  75 mcg  DAILY@0630


 GT


   9/30/20 06:30


 10/30/20 06:29  10/13/20 05:26





 


 Loperamide HCl


  (Imodium)  2 mg  Q6H  PRN


 NG


 Diarrhea  9/16/20 10:30


 10/16/20 10:29  9/23/20 11:41





 


 Pantoprazole


  (Protonix)  40 mg  EVERY 12  HOURS


 IVP


   9/28/20 21:00


 10/28/20 20:59  10/13/20 09:07





 


 Potassium Chloride


  (K-Dur)  40 meq  EVERY 12  HOURS


 GT


   9/26/20 21:00


 12/19/20 12:14  10/13/20 09:08











Allergies:  


Coded Allergies:  


     No Known Allergies (Unverified , 1/8/19)


ROS Limited/Unobtainable:  Yes


Subjective


60 YO F with Down's syndrome admitted with hypoxia.  Now sepsis and pneumonia.  

Cover for Int Phi-DR Hung.  Step Down Unit





Objective





Last Vital Signs








  Date Time  Temp Pulse Resp B/P (MAP) Pulse Ox O2 Delivery O2 Flow Rate FiO2


 


10/13/20 16:58      Mechanical Ventilator  





      Mechanical Ventilator  





      Mechanical Ventilator  


 


10/13/20 16:35        45


 


10/13/20 16:00 97.3 48 20 102/54 (70) 100   











Laboratory Tests








Test


 10/12/20


22:20 10/13/20


00:37 10/13/20


04:30 10/13/20


11:04


 


POC Whole Blood Glucose


 Pending  


 111 MG/DL


()  H 


 89 MG/DL


()


 


White Blood Count


 


 


 8.6 K/UL


(4.8-10.8) 





 


Red Blood Count


 


 


 2.33 M/UL


(4.20-5.40)  L 





 


Hemoglobin


 


 


 7.4 G/DL


(12.0-16.0)  L 





 


Hematocrit


 


 


 22.0 %


(37.0-47.0)  L 





 


Mean Corpuscular Volume   94 FL (80-99)   


 


Mean Corpuscular Hemoglobin


 


 


 31.8 PG


(27.0-31.0)  H 





 


Mean Corpuscular Hemoglobin


Concent 


 


 33.7 G/DL


(32.0-36.0) 





 


Red Cell Distribution Width


 


 


 17.6 %


(11.6-14.8)  H 





 


Platelet Count


 


 


 79 K/UL


(150-450)  L 





 


Mean Platelet Volume


 


 


 9.9 FL


(6.5-10.1) 





 


Neutrophils (%) (Auto)


 


 


 % (45.0-75.0)


 





 


Lymphocytes (%) (Auto)


 


 


 % (20.0-45.0)


 





 


Monocytes (%) (Auto)    % (1.0-10.0)   


 


Eosinophils (%) (Auto)    % (0.0-3.0)   


 


Basophils (%) (Auto)    % (0.0-2.0)   


 


Differential Total Cells


Counted 


 


 100  


 





 


Neutrophils % (Manual)   69 % (45-75)   


 


Lymphocytes % (Manual)   23 % (20-45)   


 


Monocytes % (Manual)   5 % (1-10)   


 


Eosinophils % (Manual)   0 % (0-3)   


 


Basophils % (Manual)   0 % (0-2)   


 


Band Neutrophils   3 % (0-8)   


 


Platelet Estimate   Decreased  L 


 


Platelet Morphology   Normal   


 


Anisocytosis   2+   


 


Sodium Level


 


 


 141 MMOL/L


(136-145) 





 


Potassium Level


 


 


 3.6 MMOL/L


(3.5-5.1) 





 


Chloride Level


 


 


 109 MMOL/L


()  H 





 


Carbon Dioxide Level


 


 


 25 MMOL/L


(21-32) 





 


Anion Gap


 


 


 7 mmol/L


(5-15) 





 


Blood Urea Nitrogen


 


 


 14 mg/dL


(7-18) 





 


Creatinine


 


 


 0.5 MG/DL


(0.55-1.30)  L 





 


Estimat Glomerular Filtration


Rate 


 


 > 60 mL/min


(>60) 





 


Glucose Level


 


 


 141 MG/DL


()  H 





 


Calcium Level


 


 


 7.1 MG/DL


(8.5-10.1)  L 





 


Phosphorus Level


 


 


 2.6 MG/DL


(2.5-4.9) 





 


Magnesium Level


 


 


 1.5 MG/DL


(1.8-2.4)  L 





 


Total Bilirubin


 


 


 0.5 MG/DL


(0.2-1.0) 





 


Aspartate Amino Transf


(AST/SGOT) 


 


 21 U/L (15-37)


 





 


Alanine Aminotransferase


(ALT/SGPT) 


 


 37 U/L (12-78)


 





 


Alkaline Phosphatase


 


 


 76 U/L


() 





 


Total Protein


 


 


 5.0 G/DL


(6.4-8.2)  L 





 


Albumin


 


 


 1.2 G/DL


(3.4-5.0)  L 





 


Globulin   3.8 g/dL   


 


Albumin/Globulin Ratio


 


 


 0.3 (1.0-2.7)


L 





 


Test


 10/13/20


16:16 


 


 





 


POC Whole Blood Glucose Pending     

















Intake and Output  


 


 10/12/20 10/13/20





 19:00 07:00


 


Intake Total 1309.2 ml 1250 ml


 


Output Total 750 ml 


 


Balance 559.2 ml 1250 ml


 


  


 


Free Water 140 ml 100 ml


 


IV Total 569.2 ml 600 ml


 


Tube Feeding 600 ml 550 ml


 


Output Urine Total 750 ml 








Objective


General Appearance:  WD/WN, no apparent distress, alert


EENT:  PERRL/EOMI, normal ENT inspection


Neck:  non-tender, normal alignment, supple, normal inspection


Cardiovascular:  normal peripheral pulses, normal rate, regular rhythm, no 

gallop/murmur, no JVD


Respiratory/Chest:  Mech vent; decreased breath sounds, crackles/rales, rhonchi 

- bilaterally, expiratory wheezing


Abdomen:  normal bowel sounds, non tender, soft, no organomegaly, no mass


Extremities:  normal range of motion


Neurologic:  CNs II-XII grossly normal


Skin:  normal pigmentation, warm/dry





Assessment/Plan


Problem List:  


(1) HCAP (healthcare-associated pneumonia)


Assessment & Plan:  Strep Group G.  S/P amikacin per ID=Dr Gomes.  

Pulmonary/Critical care=DR Cherry.  COVID NEG





(2) Sepsis


Assessment & Plan:  Staph haemolyticus.  S/P amikacin per ID=Dr Gomes





(3) Down's syndrome


(4) Dysphagia


Assessment & Plan:  S/P PEG





(5) Seizure disorder


Assessment & Plan:  Continue keppra and depakote





(6) Hypothyroidism


Assessment & Plan:  Continue synthroid





(7) Acute respiratory failure


Assessment & Plan:  Pulmonary = Dr Cherry; mech vent





(8) Pneumothorax, right


Assessment & Plan:  Continue chest tube per pulmonary





(9) Cardiac arrest


Assessment & Plan:  8/26/20-see cardiology note=Sanya Dunn MD               Oct 13, 2020 18:46

## 2020-10-13 NOTE — NUR
RESPIRATORY NOTE:

Received pt on AC 20, 500VT, 45%, no PEEP. Pt is trach-dependent w/ a Cuffed, Shiley 8 
tube. Pt is awake/disoriented. B/S sushil. rhonchi, sxn small to moderate amounts of 
thick/thin/frothy, pale-yellow to tan-yellow secretions. Vent plugged into red outlet, 
ambubag at bedside. Pt resting comfortably, in no apparent distress at this time. Will 
continue plan of care.

## 2020-10-13 NOTE — NUR
NURSE NOTES:

PICC line dressing changed. No signs of pulling medical devices. Pt awake and smiling at 
nurses. In stable condition.

## 2020-10-13 NOTE — DIAGNOSTIC IMAGING REPORT
Indication: Dyspnea

 

Technique: One view of the chest

 

Comparison: 10/12/2020

 

Findings: Interim removal of previously demonstrated left chest vent catheter. No

pneumothorax. Bilateral diffuse parenchymal infiltrates are unchanged. Tracheostomy,

left arm PICC remain.

 

Impression: Interim vent catheter removal from the left chest. No pneumothorax.

Otherwise unchanged

## 2020-10-13 NOTE — NUR
NURSE HAND-OFF REPORT: 



Important Events on Shift:none

Patient Status:full code 

Diet: vital af



Pending Orders: none

Pending Results/Labs:none

Pending MD notification:none



Latest Vital Signs: Temperature 97.3 , Pulse 48 , B/P 102 /54 , Respiratory Rate 20 , O2 SAT 
100 , Mechanical Ventilator, O2 Flow Rate 15.0 .  

Vital Sign Comment: none



EKG Rhythm: Sinus Bradycardia

Rhythm change?: N 

MD Notified?: Y -Dr Mehul MENDENHALL Response: No New Orders Received



Latest Momin Fall Score: 70  

Fall Risk: High Risk 

Safety Measures: Call light Within Reach, Bed Alarm Zone 1, Side Rails Side Rails x3, Bed 
position Low and Locked.

Fall Precautions: 

Yellow Socks



Report given to YANIQUE Murray.

## 2020-10-13 NOTE — INFECTIOUS DISEASES PROG NOTE
Assessment/Plan





ASSESSMENT:


sp code blue 8/26


Septic Shock; SP


Fever, recurrent- low grade -SP


Leukocytosis; recurrent; SP


     10/5 u/a 10-25, nit neg, leuk +2;  ucx PsA (I Genta); colonizer


            sp cx P, mirabilsi (R levo); colonzier


     -9/19 Bcx Neg


   -9/9 u/a no pyuria


   -9/8 Bcx Neg(Picc line)


   -9/6 Bcx Neg


            ucx Neg


             sp cx C. parapsilopsis


  -8/26 u/a no pyuria





Pneumonia.- COVID 19 neg x3


   Acute hypoxic resp failure on VM> NRB 15l 100%; hypoxic on ABG> now VDRF 

8/26- Fio2 80% >100% 8/28> 60% 9/1 >80% 9/2   >95% 9/10 >90% 9/14 >60% 9/15


R tension Pneumothroax sp CT 9/21 


    10/9 sp trach


      10/9 CXR:   Diffuse airspace opacities.


      10/8 CXR: bilateral infiltrates are all unchanged.


      10/5 CXR: Extensive bilateral airspace opacities, right greater than left,

 consistent with multifocal infiltrate worse pulmonary edema.  This is  similar 

in appearance to the prior study. Worsening, small left pleural effusion.  This 

may be secondary to  redistribution as the right-sided pleural effusion has 

mildly improved. 


       -9/22 CXR: Interim complete reexpansion of right lung without evidence of

residual pneumothorax. Bilateral diffuse and extensive infiltrates. Markedly 

improved chest wall subcutaneous emphysema


       -9/21 CXR: Interim reexpansion of previously demonstrated right 

pneumothorax, status post large bore chest tube placement.


      -9/14 CXR: Improved aeration of both lungs.


       -9/5 CXR:Small bilateral pleural effusions with minor edema.  Stable 

edema with  mild worsening in the degree of pleural effusion on the right.


         -9/1 sp cx Neg


         8/31 CXR: Extensive bilateral interstitial and airspace disease appears

similar to the prior exam. Moderate to large bilateral pleural effusions appear 

unchanged.


   8/28 CXR: Increasing left upper lobe dense consolidation and likely 

increasing bilateral pleural fluid. Persistent diffuse dense consolidation 

elsewhere


            -8/26 sp cx normal resp lilliam


             -8/25 CXR: Increased atelectasis of the right lung, since prior 

exam of 3 days earlier. New or increased right pleural effusion. Increased left 

basilar consolidation and/or pleural fluid 


          -COVID Rapid PCR neg 8/23, 8/23, 8/26


          -8/22 spc x Group G strep


          -8/22 CXR:   Reduced lung volumes.  Patchy bilateral predominantly 

interstitial  pulmonary opacities.  Could be from edema and/or pneumonia.  There

is a broader differential.


 


        -legionella ag urine, blasto ab, Histo ab, HIV ab screen, JOY, ANCA neg





Persistent, high grade bacteremia-


     -8/22 Bcx 4/4 sets S. haemolyticus; 8/23 Bcx 3/4 S/ epi; 8/27 Bcx 1.4 S. 

warnerri; 8/29 Bcx Neg


     -2d echo: no vegetaions seen





          ua/ wbc 10-15, nit neg, leuk +1; ucx Neg





DAVID; 


 -supratherapeutic vanco levels





-Seizure disorder.


- Hypothyroidism.


- Down syndrome.





History of PEG tube placement.


[facility] resident








PLAN:


Monitor off abx unless febrile, increasing WBC and/or HD unstable





          9/14 SP Meropenem #18, IV Amikacin #7


          9/4/20 SP Daptomycin #11


          9/2 SP MIcafungin #7, Linezolid #5


   8/28 SP Azithromycin #7/7


   8/26 SP Ceftriaxone #2


   8/25 SP IV Vancomycin #4, Zosyn #4


   8/22 SP Cefepime x1, Flagyl x1





- Monitor CBC, BMP.


.f/u cx


- COVID neg x3


- Monitor chest x-ray.


-PEG/Trach care


-aspiration precautions








Thank you, Dr. Cherry, for allowing me to participate in the care of


this patient.  I will follow the patient with you at this


hospitalization.








Discussed with RN





Subjective


Allergies:  


Coded Allergies:  


     No Known Allergies (Unverified , 1/8/19)


afebrile


no leukocytosis


discharge planning





Objective





Last 24 Hour Vital Signs








  Date Time  Temp Pulse Resp B/P (MAP) Pulse Ox O2 Delivery O2 Flow Rate FiO2


 


10/13/20 12:00  47      


 


10/13/20 10:53  54 20     45


 


10/13/20 08:00      Mechanical Ventilator  





      Mechanical Ventilator  





      Mechanical Ventilator  


 


10/13/20 08:00 98.4 48 21 118/74 (89) 100   


 


10/13/20 08:00        45


 


10/13/20 08:00  45      


 


10/13/20 07:02  52 20     45


 


10/13/20 04:00        45


 


10/13/20 04:00      Mechanical Ventilator  





      Mechanical Ventilator  





      Mechanical Ventilator  


 


10/13/20 04:00 97.7 97 20 125/55 (78) 99   


 


10/13/20 03:28  53      


 


10/13/20 03:14  50 20     45


 


10/13/20 00:00      Mechanical Ventilator  





      Mechanical Ventilator  





      Mechanical Ventilator  


 


10/13/20 00:00 98.1 60 20 137/51 (79) 99   


 


10/13/20 00:00        45


 


10/12/20 23:32  67      


 


10/12/20 23:10  51 20     45


 


10/12/20 20:00        45


 


10/12/20 20:00 97.9 58 21 109/58 (75) 99   


 


10/12/20 20:00      Mechanical Ventilator  





      Mechanical Ventilator  





      Mechanical Ventilator  


 


10/12/20 20:00  58      


 


10/12/20 19:06  58      


 


10/12/20 19:04  59 21     45


 


10/12/20 16:00  48      


 


10/12/20 16:00 98.1 59 20 104/51 (68) 99   


 


10/12/20 16:00      Mechanical Ventilator  





      Mechanical Ventilator  





      Mechanical Ventilator  


 


10/12/20 16:00        45


 


10/12/20 14:31  65 20     45








Height (Feet):  5


Height (Inches):  3.00


Weight (Pounds):  173


HEENT:  . trach in place


CHEST:  Coarse breathing sounds.


HEART:  S1 and S2.


ABDOMEN:  Soft.  PEG tube in place.


SKIN: no rash





Laboratory Tests








Test


 10/12/20


18:18 10/12/20


22:20 10/13/20


00:37 10/13/20


04:30


 


POC Whole Blood Glucose


 113 MG/DL


()  H Pending  


 111 MG/DL


()  H 





 


White Blood Count


 


 


 


 8.6 K/UL


(4.8-10.8)


 


Red Blood Count


 


 


 


 2.33 M/UL


(4.20-5.40)  L


 


Hemoglobin


 


 


 


 7.4 G/DL


(12.0-16.0)  L


 


Hematocrit


 


 


 


 22.0 %


(37.0-47.0)  L


 


Mean Corpuscular Volume    94 FL (80-99)  


 


Mean Corpuscular Hemoglobin


 


 


 


 31.8 PG


(27.0-31.0)  H


 


Mean Corpuscular Hemoglobin


Concent 


 


 


 33.7 G/DL


(32.0-36.0)


 


Red Cell Distribution Width


 


 


 


 17.6 %


(11.6-14.8)  H


 


Platelet Count


 


 


 


 79 K/UL


(150-450)  L


 


Mean Platelet Volume


 


 


 


 9.9 FL


(6.5-10.1)


 


Neutrophils (%) (Auto)


 


 


 


 % (45.0-75.0)





 


Lymphocytes (%) (Auto)


 


 


 


 % (20.0-45.0)





 


Monocytes (%) (Auto)     % (1.0-10.0)  


 


Eosinophils (%) (Auto)     % (0.0-3.0)  


 


Basophils (%) (Auto)     % (0.0-2.0)  


 


Differential Total Cells


Counted 


 


 


 100  





 


Neutrophils % (Manual)    69 % (45-75)  


 


Lymphocytes % (Manual)    23 % (20-45)  


 


Monocytes % (Manual)    5 % (1-10)  


 


Eosinophils % (Manual)    0 % (0-3)  


 


Basophils % (Manual)    0 % (0-2)  


 


Band Neutrophils    3 % (0-8)  


 


Platelet Estimate    Decreased  L


 


Platelet Morphology    Normal  


 


Anisocytosis    2+  


 


Sodium Level


 


 


 


 141 MMOL/L


(136-145)


 


Potassium Level


 


 


 


 3.6 MMOL/L


(3.5-5.1)


 


Chloride Level


 


 


 


 109 MMOL/L


()  H


 


Carbon Dioxide Level


 


 


 


 25 MMOL/L


(21-32)


 


Anion Gap


 


 


 


 7 mmol/L


(5-15)


 


Blood Urea Nitrogen


 


 


 


 14 mg/dL


(7-18)


 


Creatinine


 


 


 


 0.5 MG/DL


(0.55-1.30)  L


 


Estimat Glomerular Filtration


Rate 


 


 


 > 60 mL/min


(>60)


 


Glucose Level


 


 


 


 141 MG/DL


()  H


 


Calcium Level


 


 


 


 7.1 MG/DL


(8.5-10.1)  L


 


Phosphorus Level


 


 


 


 2.6 MG/DL


(2.5-4.9)


 


Magnesium Level


 


 


 


 1.5 MG/DL


(1.8-2.4)  L


 


Total Bilirubin


 


 


 


 0.5 MG/DL


(0.2-1.0)


 


Aspartate Amino Transf


(AST/SGOT) 


 


 


 21 U/L (15-37)





 


Alanine Aminotransferase


(ALT/SGPT) 


 


 


 37 U/L (12-78)





 


Alkaline Phosphatase


 


 


 


 76 U/L


()


 


Total Protein


 


 


 


 5.0 G/DL


(6.4-8.2)  L


 


Albumin


 


 


 


 1.2 G/DL


(3.4-5.0)  L


 


Globulin    3.8 g/dL  


 


Albumin/Globulin Ratio


 


 


 


 0.3 (1.0-2.7)


L


 


Test


 10/13/20


11:04 


 


 





 


POC Whole Blood Glucose


 89 MG/DL


() 


 


 














Current Medications








 Medications


  (Trade)  Dose


 Ordered  Sig/Didi


 Route


 PRN Reason  Start Time


 Stop Time Status Last Admin


Dose Admin


 


 Acetaminophen


  (Tylenol)  650 mg  Q4H  PRN


 GT


 For Pain  9/23/20 17:15


 10/23/20 17:14  10/2/20 17:46





 


 Chlorhexidine


 Gluconate


  (Beatrice-Hex 2%)  1 applic  DAILY@2000


 TOPIC


   8/31/20 20:00


 11/29/20 19:59  10/12/20 20:26





 


 Clotrimazole


  (Lotrimin)  1 applic  Q12HR


 TOPIC


   8/30/20 13:00


 11/28/20 12:59  10/13/20 09:07





 


 Dextrose


  (Dextrose 50%)  25 ml  Q30M  PRN


 IV


 Hypoglycemia  8/26/20 11:30


 11/20/20 11:29   





 


 Dextrose


  (Dextrose 50%)  50 ml  Q30M  PRN


 IV


 Hypoglycemia  8/26/20 11:30


 11/20/20 11:29   





 


 Hydrocortisone


  (Solu-CORTEF)  25 mg  Q12H


 IV


   10/9/20 22:00


 1/7/21 21:59  10/13/20 10:10





 


 Insulin Aspart


  (NovoLOG)    Q6HR


 SUBQ


   9/18/20 12:00


 12/17/20 11:59  10/12/20 05:53





 


 Insulin Detemir


  (Levemir)  10 units  Q12HR


 SUBQ


   9/18/20 10:00


 12/17/20 09:59  10/12/20 22:24





 


 Levothyroxine


 Sodium


  (Synthroid)  75 mcg  DAILY@0630


 GT


   9/30/20 06:30


 10/30/20 06:29  10/13/20 05:26





 


 Loperamide HCl


  (Imodium)  2 mg  Q6H  PRN


 NG


 Diarrhea  9/16/20 10:30


 10/16/20 10:29  9/23/20 11:41





 


 Magnesium Sulfate  100 ml @ 


 100 mls/hr  Q1H


 IVPB


   10/13/20 11:15


 10/13/20 13:14  10/13/20 12:12





 


 Pantoprazole


  (Protonix)  40 mg  EVERY 12  HOURS


 IVP


   9/28/20 21:00


 10/28/20 20:59  10/13/20 09:07





 


 Potassium Chloride


  (K-Dur)  40 meq  EVERY 12  HOURS


 GT


   9/26/20 21:00


 12/19/20 12:14  10/13/20 09:08




















Rema Gomes M.D.            Oct 13, 2020 13:03

## 2020-10-13 NOTE — NEPHROLOGY PROGRESS NOTE
Assessment/Plan


Problem List:  


(1) DAVID (acute kidney injury)


(2) Respiratory failure requiring intubation


(3) Down's syndrome


(4) Seizure disorder


(5) Hypothyroidism


Assessment





Acute renal failure, likely due to hypotension


Acute respiratory distress, hypoxia


Seizure disorder


Hypothyroidism


Down syndrome


Full code











Fluid challenge with IV fluids and albumin


Midodrine for BP above 100 systolic


Check TSH level


Check


Correct level


Monitor renal parameters


Urine studies


Per orders


Plan


October 13: Labs reviewed.  Magnesium supplement given.  Hemoglobin 7.4.  Defer 

transfusion to PMD.  Continue to monitor electrolytes and renal parameters


October 12: Due discontinuation of chest tube today.  Trach to vent.  Renal 

parameters stable.


October 11: Trach to vent.  Left chest tube to drain.  Full code.  Labs reviewe

d.  Renal parameters stable.


October 10: Trached and vent.  Still has left chest tube to drain.  No chemistry

panel today.  Continue per consultants.  Patient full code.


October 9: Patient now has trach connected to vent.  Left chest tube remains in 

place.  Patient full code.  Continue per consultants.  Labs reviewed.


October 8: Discussed with RN.  Remains on ventilator.  Labs reviewed.  Stable 

from renal standpoint of view.  Patient due for tracheostomy when clinically 

stable.


October 7: On ventilator.  Left chest tube remains.  Full code.  Labs reviewed. 

Electrolyte abnormalities addressed.  Continue per consultants.


October 6: On ventilator.  Tracheostomy postponed because patient is clinically 

unstable.  Still have left chest tube draining.  Labs reviewed.  Electrolyte 

abnormalities addressed.  Continue per consultants.


October 5: Remains intubated on ventilator.  Discussed with RN.  Unclear if the 

patient is due for tracheostomy today or not.  Apparently the patient was 

reintubated again yesterday.  Patient has left chest tube.  Electrolyte 

abnormalities addressed.


October 4: Remains intubated on ventilator.  Due for tracheostomy tomorrow.  

Left chest tube present.  Patient is full code.  Labs reviewed.  Continue as is.


October 3: Remains intubated on ventilator.  Due for tracheostomy October 5.  

Has left-sided chest tube.  Stable from renal standpoint to view.  Continue per 

consultants.


October 2: Remains intubated on ventilator.  Electrolyte abnormalities 

addressed.  Supplements ordered.


October 1: Intubated on ventilator.  Labs reviewed.  Potassium supplement given.

 Remains bradycardic.  Continue per consultants.


September 30: Labs reviewed.  Electrolyte abnormalities addressed.  Heart rate 

remains bradycardic.  Continue per cardiology.  Continue to monitor renal 

parameters and electrolytes.


September 29: Labs reviewed.  Electrolyte abnormalities addressed and replaced. 

Medication list reviewed.  Heart rate remains mid 50s.  Continue as is.


September 28: On ventilator.  Full code.  Heart rate low.  Will discontinue 

Midodrin.  Continue to rest.  Electrolytes within normal limit.  Discussed with 

RN.


September 27: Remains intubated.  Full code.  No plan for tracheostomy yet.  

Labs reviewed.  All acceptable.  Continue same management


September 26: Labs reviewed.  Discussed with RN.  Potassium and phosphorus and 

magnesium replacement ordered.  Hemoglobin 9.5.  Patient remains full code.  

Continue per consultants.


September 25: Lab reviewed.  Renal parameters stable.  Full code.  Intubated.  

Electrolyte abnormalities addressed and replacement done.


September 24: Lab reviewed.  Renal parameters stable.  Full code.  Intubated.  

Has right side chest tube.  Continue per consultants.


September 23: Lab reviewed.  Renal parameters stable.  Full code.  Has right 

chest tube.  Planning process for tracheostomy.  Defer to chest and general 

surgeon.


September 22: Labs reviewed.  Renal parameters stable.  Remains full code.  

Remains on ventilator.  Due for tracheostomy tomorrow.  Continue per 

consultants.  Patient has a right chest tube in place at this time.


September 21: Labs reviewed.  Electrolyte imbalances addressed and supplemented.

 Remains full code and on ventilator.  Continue to monitor renal parameters.  

Continue per consultants.


September 20: Labs reviewed.  Serum potassium again low today.  Potassium 

supplement IV and through GT given.  Patient remains full code and is on 

ventilator.  Continue per consultants.  Continue to monitor electrolytes and 

renal parameters.


September 19: Labs reviewed.  Abnormal electrolyte addressed.  Remains full 

code.  Remains vented.


September 18: Day 27 of hospitalization.  Full code.  Labs reviewed.  Hemoglobin

down to 7.5.  Electrolyte abnormalities addressed and corrections ordered.  

Continue to monitor renal parameters.  Continue per consultants.  Start on 

Levemir for blood sugar management.  Questioning continuation of hydrocortisone?


September 17: Labs reviewed.  Potassium, phosphorus, hemoglobin, are all low.  

Potassium and phosphorus IV replacement given.  Continue to monitor electrolytes

and CBC.  Patient remains full code.


September 16: Lab reviewed.  Low phosphorus low magnesium and low potassium was 

addressed.  Hemoglobin drifting lower.  Continue per consultants.  Patient 

remains full code.


September 15: Labs reviewed.  Patient continues to be on ventilator.  D5W for 

high sodium and also potassium chloride intravenously as supplement given.  

Hemoglobin 8.4.  Continue to monitor electrolytes and renal parameters.


September 14: Labs reviewed.  Low potassium and high sodium noted.  Hemoglobin 

8.1 stable.  Aim to correct abnormal electrolyte.  Continue rest.  Will give 2 

boluses of D5W 500 cc.


September 13: Lab reviewed.  Abnormal electrolytes noted and addressed.


September 12: Labs reviewed.  Potassium supplement given.  Patient remains full 

code.  Continue per consultants.


September 11: Lab reviewed.  Electrolyte abnormalities addressed.  Continue per 

pulmonary and ID.


September 10: Lab reviewed.  Status unchanged.  Serum sodium 151 unchanged.  

Stable from renal standpoint of view.


September 9: Labs reviewed.  Status quo.  D5W 500 cc IV ordered.  Continue to 

monitor renal parameters.


September 8: Status quo.  Labs reviewed.  Overall condition unchanged.  Patient 

was transfused and hemoglobin higher.  Continue current management.  Patient 

remains full code.


September 7: Status quo.  Overall condition poor.  Very low albumin.  Edematous.

 Hypotensive.  Hemoglobin lower.  Anemia work-up ordered.  I favor transfusion 2

units of packed RBCs.  Patient remains full code.  I favor supportive care only.

 Will discuss.


September 6: Electrolyte abnormalities addressed.  Serum creatinine lower.  

Continue per current management.


September 5: Status unchanged.  Lab reviewed.  Serum potassium 2.7.  IV 

potassium chloride ordered.  Serum creatinine low at 1.6 stable.  Blood pressure

90s systolic


September 4: Status quo.  Labs reviewed.  Renal parameters stable.  Serum creati

nine down to 1.6.  Medication list reviewed.  Continues to be on midodrine.  

Continue per consultants.


September 3: Status quo.  Labs reviewed.  Electrolytes adjusted.  Serum 

creatinine down to 1.8.  Continue per consultants.


September 2: Status quo.  Labs reviewed.  Phosphorus supplement IV given.  Serum

creatinine 2.  Continue per consultants.


September 1: Requires less pressors.  Albumin bolus given.  1 dose of Lasix IV 

ordered as the patient severely edematous.  Patient serum albumin is very low.  

Continue per consultants.


August 31: Continues to be intubated.  Labs reviewed.  Serum creatinine 1.9 

unchanged.  Blood pressure more stable.  Off 1 of the pressors.  Continue to 

monitor renal parameters.  Continue per consultants.  Patient now on 

hydrocortisone 100 mg every 8 hours.  Will decrease IV fluid.  Normal saline 

down to 50 cc an hour.


August 30: Intubated.  Labs reviewed.  Creatinine 1.9 unchanged.  Continue same 

treatment plan.  Per consultants.  Overall poor prognosis since the patient 

remains on pressors and her pulmonary status is worsening.


August 29: Remains intubated.  Labs reviewed.  Creatinine 1.9.  Blood pressure 

systolic 90s.  Continue per consultants.


August 28: Remains intubated.  Labs reviewed.  Serum creatinine lower to 2.  

Vancomycin level lower.  Remains hypotensive on pressors.  Will increase 

midodrine to 10 mg every 8 hours.  Continue per consultants.  Continue to 

monitor renal parameters.


August 27: Patient now in ICU.  Intubated.  On pressors.  Labs reviewed.  Will 

increase midodrine.  Aim to keep blood pressure over 100 systolic.  Will give 

albumin bolus.  Will check vancomycin level which was elevated when checked 

previously on August 24.  Will monitor renal parameters.  Continue per 

consultants.





Subjective


ROS Limited/Unobtainable:  Yes





Objective


Objective





Last 24 Hour Vital Signs








  Date Time  Temp Pulse Resp B/P (MAP) Pulse Ox O2 Delivery O2 Flow Rate FiO2


 


10/13/20 12:00 97.9 53 20 114/63 (80) 97   


 


10/13/20 12:00  47      


 


10/13/20 12:00        45


 


10/13/20 12:00      Mechanical Ventilator  





      Mechanical Ventilator  





      Mechanical Ventilator  


 


10/13/20 10:53  54 20     45


 


10/13/20 08:00      Mechanical Ventilator  





      Mechanical Ventilator  





      Mechanical Ventilator  


 


10/13/20 08:00 98.4 48 21 118/74 (89) 100   


 


10/13/20 08:00        45


 


10/13/20 08:00  45      


 


10/13/20 07:02  52 20     45


 


10/13/20 04:00        45


 


10/13/20 04:00      Mechanical Ventilator  





      Mechanical Ventilator  





      Mechanical Ventilator  


 


10/13/20 04:00 97.7 97 20 125/55 (78) 99   


 


10/13/20 03:28  53      


 


10/13/20 03:14  50 20     45


 


10/13/20 00:00      Mechanical Ventilator  





      Mechanical Ventilator  





      Mechanical Ventilator  


 


10/13/20 00:00 98.1 60 20 137/51 (79) 99   


 


10/13/20 00:00        45


 


10/12/20 23:32  67      


 


10/12/20 23:10  51 20     45


 


10/12/20 20:00        45


 


10/12/20 20:00 97.9 58 21 109/58 (75) 99   


 


10/12/20 20:00      Mechanical Ventilator  





      Mechanical Ventilator  





      Mechanical Ventilator  


 


10/12/20 20:00  58      


 


10/12/20 19:06  58      


 


10/12/20 19:04  59 21     45


 


10/12/20 16:00  48      


 


10/12/20 16:00 98.1 59 20 104/51 (68) 99   


 


10/12/20 16:00      Mechanical Ventilator  





      Mechanical Ventilator  





      Mechanical Ventilator  


 


10/12/20 16:00        45

















Intake and Output  


 


 10/12/20 10/13/20





 19:00 07:00


 


Intake Total 1309.2 ml 1250 ml


 


Output Total 750 ml 


 


Balance 559.2 ml 1250 ml


 


  


 


Free Water 140 ml 100 ml


 


IV Total 569.2 ml 600 ml


 


Tube Feeding 600 ml 550 ml


 


Output Urine Total 750 ml 








Laboratory Tests


10/12/20 18:18: POC Whole Blood Glucose 113H


10/12/20 22:20: POC Whole Blood Glucose [Pending]


10/13/20 00:37: POC Whole Blood Glucose 111H


10/13/20 04:30: 


White Blood Count 8.6, Red Blood Count 2.33L, Hemoglobin 7.4L, Hematocrit 22.0L,

 Mean Corpuscular Volume 94, Mean Corpuscular Hemoglobin 31.8H, Mean Corpuscular

 Hemoglobin Concent 33.7, Red Cell Distribution Width 17.6H, Platelet Count 79L,

 Mean Platelet Volume 9.9, Neutrophils (%) (Auto) , Lymphocytes (%) (Auto) , 

Monocytes (%) (Auto) , Eosinophils (%) (Auto) , Basophils (%) (Auto) , 

Differential Total Cells Counted 100, Neutrophils % (Manual) 69, Lymphocytes % 

(Manual) 23, Monocytes % (Manual) 5, Eosinophils % (Manual) 0, Basophils % 

(Manual) 0, Band Neutrophils 3, Platelet Estimate DecreasedL, Platelet 

Morphology Normal, Anisocytosis 2+, Sodium Level 141, Potassium Level 3.6, 

Chloride Level 109H, Carbon Dioxide Level 25, Anion Gap 7, Blood Urea Nitrogen 

14, Creatinine 0.5L, Estimat Glomerular Filtration Rate > 60, Glucose Level 141H

, Calcium Level 7.1L, Phosphorus Level 2.6, Magnesium Level 1.5L, Total 

Bilirubin 0.5, Aspartate Amino Transf (AST/SGOT) 21, Alanine Aminotransferase 

(ALT/SGPT) 37, Alkaline Phosphatase 76, Total Protein 5.0L, Albumin 1.2L, 

Globulin 3.8, Albumin/Globulin Ratio 0.3L


10/13/20 11:04: POC Whole Blood Glucose 89


Height (Feet):  5


Height (Inches):  3.00


Weight (Pounds):  173


General Appearance:  no apparent distress


EENT:  other - Tracheostomy to vent


Cardiovascular:  bradycardia


Respiratory/Chest:  decreased breath sounds


Abdomen:  distended











Lam Nino MD            Oct 13, 2020 14:45

## 2020-10-13 NOTE — NUR
CASE MANAGEMENT: REVIEW





SI: ARDS . RESP FAILURE . DOWN'S SYNDROME . PNEUMOTHORAX 

TRACHEOSTOMY 10/08 

RIGHT CHEST TUBE 10/02 

T 97.9 HR 45 RR 20 /63 SAT 97% MECH VENT FIO2 45

H/H 7.4/22.0 MAG 1.5 







IS: NS IVF @ 50ML/HR

SOLU CORTEF IV Q12HR

PROTONIX IV Q12HR

MAG SULFATE IV X1

NS IVF X1 







***STEP DOWN UNIT STATUS***

DCP: PATIENT IS FROM [facility]

## 2020-10-14 VITALS — DIASTOLIC BLOOD PRESSURE: 60 MMHG | SYSTOLIC BLOOD PRESSURE: 121 MMHG

## 2020-10-14 VITALS — SYSTOLIC BLOOD PRESSURE: 104 MMHG | DIASTOLIC BLOOD PRESSURE: 72 MMHG

## 2020-10-14 VITALS — SYSTOLIC BLOOD PRESSURE: 101 MMHG | DIASTOLIC BLOOD PRESSURE: 50 MMHG

## 2020-10-14 VITALS — SYSTOLIC BLOOD PRESSURE: 109 MMHG | DIASTOLIC BLOOD PRESSURE: 54 MMHG

## 2020-10-14 VITALS — DIASTOLIC BLOOD PRESSURE: 56 MMHG | SYSTOLIC BLOOD PRESSURE: 117 MMHG

## 2020-10-14 VITALS — SYSTOLIC BLOOD PRESSURE: 114 MMHG | DIASTOLIC BLOOD PRESSURE: 61 MMHG

## 2020-10-14 LAB
ADD MANUAL DIFF: YES
ANION GAP SERPL CALC-SCNC: 6 MMOL/L (ref 5–15)
BUN SERPL-MCNC: 15 MG/DL (ref 7–18)
CALCIUM SERPL-MCNC: 7.3 MG/DL (ref 8.5–10.1)
CHLORIDE SERPL-SCNC: 111 MMOL/L (ref 98–107)
CO2 SERPL-SCNC: 26 MMOL/L (ref 21–32)
CREAT SERPL-MCNC: 0.5 MG/DL (ref 0.55–1.3)
ERYTHROCYTE [DISTWIDTH] IN BLOOD BY AUTOMATED COUNT: 17.6 % (ref 11.6–14.8)
HCT VFR BLD CALC: 21.7 % (ref 37–47)
HGB BLD-MCNC: 7.1 G/DL (ref 12–16)
MCV RBC AUTO: 95 FL (ref 80–99)
PLATELET # BLD: 94 K/UL (ref 150–450)
POTASSIUM SERPL-SCNC: 3.9 MMOL/L (ref 3.5–5.1)
RBC # BLD AUTO: 2.29 M/UL (ref 4.2–5.4)
SODIUM SERPL-SCNC: 143 MMOL/L (ref 136–145)
WBC # BLD AUTO: 7.5 K/UL (ref 4.8–10.8)

## 2020-10-14 RX ADMIN — HYDROCORTISONE SODIUM SUCCINATE SCH MG: 100 INJECTION, POWDER, FOR SOLUTION INTRAMUSCULAR; INTRAVENOUS at 10:20

## 2020-10-14 RX ADMIN — INSULIN DETEMIR SCH UNITS: 100 INJECTION, SOLUTION SUBCUTANEOUS at 09:00

## 2020-10-14 RX ADMIN — CHLORHEXIDINE GLUCONATE SCH APPLIC: 213 SOLUTION TOPICAL at 20:26

## 2020-10-14 RX ADMIN — INSULIN ASPART SCH UNITS: 100 INJECTION, SOLUTION INTRAVENOUS; SUBCUTANEOUS at 23:39

## 2020-10-14 RX ADMIN — INSULIN ASPART SCH UNITS: 100 INJECTION, SOLUTION INTRAVENOUS; SUBCUTANEOUS at 06:05

## 2020-10-14 RX ADMIN — HYDROCORTISONE SODIUM SUCCINATE SCH MG: 100 INJECTION, POWDER, FOR SOLUTION INTRAMUSCULAR; INTRAVENOUS at 21:21

## 2020-10-14 RX ADMIN — PANTOPRAZOLE SODIUM SCH MG: 40 INJECTION, POWDER, FOR SOLUTION INTRAVENOUS at 20:26

## 2020-10-14 RX ADMIN — INSULIN ASPART SCH UNITS: 100 INJECTION, SOLUTION INTRAVENOUS; SUBCUTANEOUS at 17:31

## 2020-10-14 RX ADMIN — INSULIN ASPART SCH UNITS: 100 INJECTION, SOLUTION INTRAVENOUS; SUBCUTANEOUS at 11:56

## 2020-10-14 RX ADMIN — INSULIN DETEMIR SCH UNITS: 100 INJECTION, SOLUTION SUBCUTANEOUS at 20:20

## 2020-10-14 RX ADMIN — INSULIN ASPART SCH UNITS: 100 INJECTION, SOLUTION INTRAVENOUS; SUBCUTANEOUS at 00:12

## 2020-10-14 RX ADMIN — PANTOPRAZOLE SODIUM SCH MG: 40 INJECTION, POWDER, FOR SOLUTION INTRAVENOUS at 08:57

## 2020-10-14 NOTE — PULMONOLGY CRITICAL CARE NOTE
Critical Care - Asmt/Plan


Problems:  


(1) Acute respiratory failure


(2) Pneumothorax


(3) Bacteremia


(4) Pneumonia


(5) Sepsis


(6) HCAP (healthcare-associated pneumonia)


(7) Seizure disorder


(8) Down's syndrome


(9) Trisomy 21, Down syndrome


Respiratory:  monitor respiratory rate, adjust FIO2, CXR


Cardiac:  continue to monitor HR/BP


Renal:  F/U I&O, other - one dose of lasix today, check bnp and cxr in am


Infectious Disease:  check cultures


Gastrointestinal:  continue feedings/current rate


Endocrine:  monitor blood sugar


Neurologic:  PRN Ativan, PRN Morphine


Time Spent (Minutes):  40


Notes Reviewed:  internist, cardio, ID, GI


Discussed with:  nurses, consultants





Critical Care - Objective





Last 24 Hour Vital Signs








  Date Time  Temp Pulse Resp B/P (MAP) Pulse Ox O2 Delivery O2 Flow Rate FiO2


 


10/14/20 08:00      Mechanical Ventilator  





      Mechanical Ventilator  





      Mechanical Ventilator  


 


10/14/20 08:00 98.2 59 18 109/54 (72) 100   


 


10/14/20 08:00        45


 


10/14/20 07:41  50      


 


10/14/20 07:05  50 21     45


 


10/14/20 04:00 98.9 58 20 117/56 (76) 99   


 


10/14/20 04:00  48      


 


10/14/20 04:00      Mechanical Ventilator  





      Mechanical Ventilator  





      Mechanical Ventilator  


 


10/14/20 04:00        45


 


10/14/20 03:03  49 20     45


 


10/14/20 00:25 98.3 53 21 121/60 (80) 100   


 


10/14/20 00:00      Mechanical Ventilator  





      Mechanical Ventilator  





      Mechanical Ventilator  


 


10/14/20 00:00  45      


 


10/14/20 00:00        45


 


10/13/20 23:00  48 20     45


 


10/13/20 20:00        45


 


10/13/20 20:00 97.9 50 18 125/57 (79) 100   


 


10/13/20 20:00  50      


 


10/13/20 20:00      Mechanical Ventilator  





      Mechanical Ventilator  





      Mechanical Ventilator  


 


10/13/20 19:32  53 20     45


 


10/13/20 16:58      Mechanical Ventilator  





      Mechanical Ventilator  





      Mechanical Ventilator  


 


10/13/20 16:35        45


 


10/13/20 16:35      Mechanical Ventilator  





      Mechanical Ventilator  





      Mechanical Ventilator  


 


10/13/20 16:00 97.3 48 20 102/54 (70) 100   


 


10/13/20 15:39  53      


 


10/13/20 14:50  50 20     45


 


10/13/20 12:00 97.9 53 20 114/63 (80) 97   


 


10/13/20 12:00  47      


 


10/13/20 12:00        45


 


10/13/20 12:00      Mechanical Ventilator  





      Mechanical Ventilator  





      Mechanical Ventilator  


 


10/13/20 10:53  54 20     45








Status:  awake


Condition:  critical, improving


HEENT:  atraumatic


Neck:  trach


Lungs:  rales, rhonchi


Heart:  HR/BP stable, edema


Abdomen:  soft, non-tender, active bowel sounds


Extremities:  no C/C/E


Accucheck:  92





Critical Care - Subjective


ROS Limited/Unobtainable:  Yes


Condition:  critical


EKG Rhythm:  Sinus Rhythm


FI02:  45


Vent Support Breath Rate:  20


Vent Support Mode:  AC


Vent Tidal Volume:  500


Sputum Amount:  Moderate


PEEP:  0.0


PIP:  26


Tube Feeding Amount:  50


I&O:











Intake and Output  


 


 10/13/20 10/14/20





 19:00 07:00


 


Intake Total 1150 ml 850 ml


 


Output Total 1800 ml 


 


Balance -650 ml 850 ml


 


  


 


Free Water 100 ml 100 ml


 


IV Total 500 ml 


 


Tube Feeding 550 ml 750 ml


 


Output Urine Total 1800 ml 


 


# Bowel Movements 1 








CXR:


extensive infiltrate, trach intact


ET-Tube:  7.5


ET Position:  23


Labs:





Laboratory Tests








Test


 10/13/20


11:04 10/13/20


16:16 10/13/20


20:36 10/14/20


00:08


 


POC Whole Blood Glucose


 89 MG/DL


() Pending  


 133 MG/DL


()  H 156 MG/DL


()  H


 


Test


 10/14/20


04:00 10/14/20


05:24 10/14/20


08:55 





 


White Blood Count


 7.5 K/UL


(4.8-10.8) 


 


 





 


Red Blood Count


 2.29 M/UL


(4.20-5.40)  L 


 


 





 


Hemoglobin


 7.1 G/DL


(12.0-16.0)  L 


 


 





 


Hematocrit


 21.7 %


(37.0-47.0)  L 


 


 





 


Mean Corpuscular Volume 95 FL (80-99)     


 


Mean Corpuscular Hemoglobin


 31.0 PG


(27.0-31.0) 


 


 





 


Mean Corpuscular Hemoglobin


Concent 32.7 G/DL


(32.0-36.0) 


 


 





 


Red Cell Distribution Width


 17.6 %


(11.6-14.8)  H 


 


 





 


Platelet Count


 94 K/UL


(150-450)  L 


 


 





 


Mean Platelet Volume


 9.4 FL


(6.5-10.1) 


 


 





 


Neutrophils (%) (Auto)


 % (45.0-75.0)


 


 


 





 


Lymphocytes (%) (Auto)


 % (20.0-45.0)


 


 


 





 


Monocytes (%) (Auto)  % (1.0-10.0)     


 


Eosinophils (%) (Auto)  % (0.0-3.0)     


 


Basophils (%) (Auto)  % (0.0-2.0)     


 


Differential Total Cells


Counted 100  


 


 


 





 


Neutrophils % (Manual) 67 % (45-75)     


 


Lymphocytes % (Manual) 26 % (20-45)     


 


Monocytes % (Manual) 5 % (1-10)     


 


Eosinophils % (Manual) 1 % (0-3)     


 


Basophils % (Manual) 0 % (0-2)     


 


Band Neutrophils 1 % (0-8)     


 


Platelet Estimate Decreased  L   


 


Platelet Morphology Normal     


 


Anisocytosis 1+     


 


Sodium Level


 143 MMOL/L


(136-145) 


 


 





 


Potassium Level


 3.9 MMOL/L


(3.5-5.1) 


 


 





 


Chloride Level


 111 MMOL/L


()  H 


 


 





 


Carbon Dioxide Level


 26 MMOL/L


(21-32) 


 


 





 


Anion Gap


 6 mmol/L


(5-15) 


 


 





 


Blood Urea Nitrogen


 15 mg/dL


(7-18) 


 


 





 


Creatinine


 0.5 MG/DL


(0.55-1.30)  L 


 


 





 


Estimat Glomerular Filtration


Rate > 60 mL/min


(>60) 


 


 





 


Glucose Level


 177 MG/DL


()  H 


 


 





 


Calcium Level


 7.3 MG/DL


(8.5-10.1)  L 


 


 





 


POC Whole Blood Glucose


 


 Pending  


 92 MG/DL


() 




















Chano Cherry MD              Oct 14, 2020 10:09

## 2020-10-14 NOTE — SURGERY PROGRESS NOTE
Surgery Progress Note


Subjective


Procedure Performed


removal left chest tube


Additional Comments


chest tube out


comfortable


no n/v


labs okay


cxr noted and reviewed





Objective





Last 24 Hour Vital Signs








  Date Time  Temp Pulse Resp B/P (MAP) Pulse Ox O2 Delivery O2 Flow Rate FiO2


 


10/14/20 16:00        45


 


10/14/20 16:00      Mechanical Ventilator  





      Mechanical Ventilator  





      Mechanical Ventilator  


 


10/14/20 16:00 98.1 60 19 114/61 (78) 98   


 


10/14/20 15:13  70      


 


10/14/20 12:00        45


 


10/14/20 12:00      Mechanical Ventilator  





      Mechanical Ventilator  





      Mechanical Ventilator  


 


10/14/20 12:00 98.1 50 21 104/72 (83) 100   


 


10/14/20 11:44  48      


 


10/14/20 11:13  51 20     45


 


10/14/20 08:00      Mechanical Ventilator  





      Mechanical Ventilator  





      Mechanical Ventilator  


 


10/14/20 08:00 98.2 59 18 109/54 (72) 100   


 


10/14/20 08:00        45


 


10/14/20 07:41  50      


 


10/14/20 07:05  50 21     45


 


10/14/20 04:00 98.9 58 20 117/56 (76) 99   


 


10/14/20 04:00  48      


 


10/14/20 04:00      Mechanical Ventilator  





      Mechanical Ventilator  





      Mechanical Ventilator  


 


10/14/20 04:00        45


 


10/14/20 03:03  49 20     45


 


10/14/20 00:25 98.3 53 21 121/60 (80) 100   


 


10/14/20 00:00      Mechanical Ventilator  





      Mechanical Ventilator  





      Mechanical Ventilator  


 


10/14/20 00:00  45      


 


10/14/20 00:00        45


 


10/13/20 23:00  48 20     45


 


10/13/20 20:00        45


 


10/13/20 20:00 97.9 50 18 125/57 (79) 100   


 


10/13/20 20:00  50      


 


10/13/20 20:00      Mechanical Ventilator  





      Mechanical Ventilator  





      Mechanical Ventilator  


 


10/13/20 19:32  53 20     45


 


10/13/20 16:58      Mechanical Ventilator  





      Mechanical Ventilator  





      Mechanical Ventilator  








I&O











Intake and Output  


 


 10/13/20 10/14/20





 19:00 07:00


 


Intake Total 1150 ml 850 ml


 


Output Total 1800 ml 


 


Balance -650 ml 850 ml


 


  


 


Free Water 100 ml 100 ml


 


IV Total 500 ml 


 


Tube Feeding 550 ml 750 ml


 


Output Urine Total 1800 ml 


 


# Bowel Movements 1 








Dressing:  saturated


Cardiovascular:  RSR


Respiratory:  decreased breath sounds


Abdomen:  non-tender, present bowel sounds


Extremities:  no edema, no tenderness, no cyanosis





Laboratory Tests








Test


 10/13/20


20:36 10/14/20


00:08 10/14/20


04:00 10/14/20


05:24


 


POC Whole Blood Glucose


 133 MG/DL


()  H 156 MG/DL


()  H 


 Pending  





 


White Blood Count


 


 


 7.5 K/UL


(4.8-10.8) 





 


Red Blood Count


 


 


 2.29 M/UL


(4.20-5.40)  L 





 


Hemoglobin


 


 


 7.1 G/DL


(12.0-16.0)  L 





 


Hematocrit


 


 


 21.7 %


(37.0-47.0)  L 





 


Mean Corpuscular Volume   95 FL (80-99)   


 


Mean Corpuscular Hemoglobin


 


 


 31.0 PG


(27.0-31.0) 





 


Mean Corpuscular Hemoglobin


Concent 


 


 32.7 G/DL


(32.0-36.0) 





 


Red Cell Distribution Width


 


 


 17.6 %


(11.6-14.8)  H 





 


Platelet Count


 


 


 94 K/UL


(150-450)  L 





 


Mean Platelet Volume


 


 


 9.4 FL


(6.5-10.1) 





 


Neutrophils (%) (Auto)


 


 


 % (45.0-75.0)


 





 


Lymphocytes (%) (Auto)


 


 


 % (20.0-45.0)


 





 


Monocytes (%) (Auto)    % (1.0-10.0)   


 


Eosinophils (%) (Auto)    % (0.0-3.0)   


 


Basophils (%) (Auto)    % (0.0-2.0)   


 


Differential Total Cells


Counted 


 


 100  


 





 


Neutrophils % (Manual)   67 % (45-75)   


 


Lymphocytes % (Manual)   26 % (20-45)   


 


Monocytes % (Manual)   5 % (1-10)   


 


Eosinophils % (Manual)   1 % (0-3)   


 


Basophils % (Manual)   0 % (0-2)   


 


Band Neutrophils   1 % (0-8)   


 


Platelet Estimate   Decreased  L 


 


Platelet Morphology   Normal   


 


Anisocytosis   1+   


 


Sodium Level


 


 


 143 MMOL/L


(136-145) 





 


Potassium Level


 


 


 3.9 MMOL/L


(3.5-5.1) 





 


Chloride Level


 


 


 111 MMOL/L


()  H 





 


Carbon Dioxide Level


 


 


 26 MMOL/L


(21-32) 





 


Anion Gap


 


 


 6 mmol/L


(5-15) 





 


Blood Urea Nitrogen


 


 


 15 mg/dL


(7-18) 





 


Creatinine


 


 


 0.5 MG/DL


(0.55-1.30)  L 





 


Estimat Glomerular Filtration


Rate 


 


 > 60 mL/min


(>60) 





 


Glucose Level


 


 


 177 MG/DL


()  H 





 


Calcium Level


 


 


 7.3 MG/DL


(8.5-10.1)  L 





 


Test


 10/14/20


08:55 10/14/20


11:44 10/14/20


12:25 





 


POC Whole Blood Glucose


 92 MG/DL


() 108 MG/DL


()  H 123 MG/DL


()  H 














Plan


Problems:  


(1) Respiratory distress


Assessment & Plan:  Respiratory insufficiency requiring prolonged ventilator 

support.  Patient on minimal vent settings right now currently in stable but 

unfortunately not safe for extubation.  Tracheostomy is indicated recommended.  

I discussed case with pulmonology team ICU team medical teams.  Patient unable 

to make decisions and her current condition and given her history.  The care 

team has been contacted and will be reviewed for evaluation and consideration of

the tracheostomy.  If consented I think it is reasonable for tracheostomy given 

patient's current CODE STATUS care plan and goals of care.  Extubation his 

current status is potentially high risk for reintubation emergency complication.

 We will plan for tracheostomy if consent is obtained.  Thank you for let me 

participate patient's care will follow with recommendations





will plan for trach when ready


wean [id number] 





s/p trach 10?8





(2) Encephalopathy chronic


(3) Dysphagia


(4) Down's syndrome


(5) Sepsis


(6) Acute respiratory failure


(7) Pneumonia


(8) Trisomy 21, Down syndrome


(9) DAVID (acute kidney injury)


(10) Bacteremia


(11) Hypokalemia


(12) Anemia


(13) Diarrhea


(14) Hypernatremia


(15) Pneumothorax


(16) Pneumothorax, right


Assessment & Plan:  right ptx.  s/p chest tube


tube removed 10/3





left ptx tube placed 10/2


left CT to water seal 


chest tube removed 10/12





(17) Cardiac arrest


(18) ARDS (adult respiratory distress syndrome)


(19) Septic shock


(20) HCAP (healthcare-associated pneumonia)


(21) Respiratory failure requiring intubation


(22) Seizure disorder


(23) Hypothyroidism











Jake Aranda                Oct 14, 2020 16:54

## 2020-10-14 NOTE — INFECTIOUS DISEASES PROG NOTE
Assessment/Plan





ASSESSMENT:


sp code blue 8/26


Septic Shock; SP


Fever, recurrent- low grade -SP


Leukocytosis; recurrent; SP


     10/5 u/a 10-25, nit neg, leuk +2;  ucx PsA (I Genta); colonizer


            sp cx P, mirabilsi (R levo); colonzier


     -9/19 Bcx Neg


   -9/9 u/a no pyuria


   -9/8 Bcx Neg(Picc line)


   -9/6 Bcx Neg


            ucx Neg


             sp cx C. parapsilopsis


  -8/26 u/a no pyuria





Pneumonia.- COVID 19 neg x3


   Acute hypoxic resp failure on VM> NRB 15l 100%; hypoxic on ABG> now VDRF 

8/26- Fio2 80% >100% 8/28> 60% 9/1 >80% 9/2   >95% 9/10 >90% 9/14 >60% 9/15


R tension Pneumothroax sp CT 9/21 


    10/9 sp trach


      10/9 CXR:   Diffuse airspace opacities.


      10/8 CXR: bilateral infiltrates are all unchanged.


      10/5 CXR: Extensive bilateral airspace opacities, right greater than left,

 consistent with multifocal infiltrate worse pulmonary edema.  This is  similar 

in appearance to the prior study. Worsening, small left pleural effusion.  This 

may be secondary to  redistribution as the right-sided pleural effusion has 

mildly improved. 


       -9/22 CXR: Interim complete reexpansion of right lung without evidence of

residual pneumothorax. Bilateral diffuse and extensive infiltrates. Markedly 

improved chest wall subcutaneous emphysema


       -9/21 CXR: Interim reexpansion of previously demonstrated right 

pneumothorax, status post large bore chest tube placement.


      -9/14 CXR: Improved aeration of both lungs.


       -9/5 CXR:Small bilateral pleural effusions with minor edema.  Stable 

edema with  mild worsening in the degree of pleural effusion on the right.


         -9/1 sp cx Neg


         8/31 CXR: Extensive bilateral interstitial and airspace disease appears

similar to the prior exam. Moderate to large bilateral pleural effusions appear 

unchanged.


   8/28 CXR: Increasing left upper lobe dense consolidation and likely 

increasing bilateral pleural fluid. Persistent diffuse dense consolidation 

elsewhere


            -8/26 sp cx normal resp lilliam


             -8/25 CXR: Increased atelectasis of the right lung, since prior 

exam of 3 days earlier. New or increased right pleural effusion. Increased left 

basilar consolidation and/or pleural fluid 


          -COVID Rapid PCR neg 8/23, 8/23, 8/26


          -8/22 spc x Group G strep


          -8/22 CXR:   Reduced lung volumes.  Patchy bilateral predominantly 

interstitial  pulmonary opacities.  Could be from edema and/or pneumonia.  There

is a broader differential.


 


        -legionella ag urine, blasto ab, Histo ab, HIV ab screen, JOY, ANCA neg





Persistent, high grade bacteremia-


     -8/22 Bcx 4/4 sets S. haemolyticus; 8/23 Bcx 3/4 S/ epi; 8/27 Bcx 1.4 S. 

warnerri; 8/29 Bcx Neg


     -2d echo: no vegetaions seen





          ua/ wbc 10-15, nit neg, leuk +1; ucx Neg





DAVID; 


 -supratherapeutic vanco levels





-Seizure disorder.


- Hypothyroidism.


- Down syndrome.





History of PEG tube placement.


[facility]








PLAN:


Monitor off abx unless febrile, increasing WBC and/or HD unstable





          9/14 SP Meropenem #18, IV Amikacin #7


          9/4/20 SP Daptomycin #11


          9/2 SP MIcafungin #7, Linezolid #5


   8/28 SP Azithromycin #7/7


   8/26 SP Ceftriaxone #2


   8/25 SP IV Vancomycin #4, Zosyn #4


   8/22 SP Cefepime x1, Flagyl x1





- Monitor CBC, BMP.


.f/u cx


- COVID neg x3


- Monitor chest x-ray.


-PEG/Trach care


-aspiration precautions


-discharge planning to SNF-needs to be FIO2 30% or below





Thank you, Dr. Cherry, for allowing me to participate in the care of


this patient.  I will follow the patient with you at this


hospitalization.








Discussed with RN





Subjective


Allergies:  


Coded Allergies:  


     No Known Allergies (Unverified , 1/8/19)


afebrile


no leukocytosis


off abx





Objective





Last 24 Hour Vital Signs








  Date Time  Temp Pulse Resp B/P (MAP) Pulse Ox O2 Delivery O2 Flow Rate FiO2


 


10/14/20 11:13  51 20     45


 


10/14/20 08:00      Mechanical Ventilator  





      Mechanical Ventilator  





      Mechanical Ventilator  


 


10/14/20 08:00 98.2 59 18 109/54 (72) 100   


 


10/14/20 08:00        45


 


10/14/20 07:41  50      


 


10/14/20 07:05  50 21     45


 


10/14/20 04:00 98.9 58 20 117/56 (76) 99   


 


10/14/20 04:00  48      


 


10/14/20 04:00      Mechanical Ventilator  





      Mechanical Ventilator  





      Mechanical Ventilator  


 


10/14/20 04:00        45


 


10/14/20 03:03  49 20     45


 


10/14/20 00:25 98.3 53 21 121/60 (80) 100   


 


10/14/20 00:00      Mechanical Ventilator  





      Mechanical Ventilator  





      Mechanical Ventilator  


 


10/14/20 00:00  45      


 


10/14/20 00:00        45


 


10/13/20 23:00  48 20     45


 


10/13/20 20:00        45


 


10/13/20 20:00 97.9 50 18 125/57 (79) 100   


 


10/13/20 20:00  50      


 


10/13/20 20:00      Mechanical Ventilator  





      Mechanical Ventilator  





      Mechanical Ventilator  


 


10/13/20 19:32  53 20     45


 


10/13/20 16:58      Mechanical Ventilator  





      Mechanical Ventilator  





      Mechanical Ventilator  


 


10/13/20 16:35        45


 


10/13/20 16:35      Mechanical Ventilator  





      Mechanical Ventilator  





      Mechanical Ventilator  


 


10/13/20 16:00 97.3 48 20 102/54 (70) 100   


 


10/13/20 15:39  53      


 


10/13/20 14:50  50 20     45








Height (Feet):  5


Height (Inches):  3.00


Weight (Pounds):  173


HEENT:  . trach in place


CHEST:  Coarse breathing sounds.


HEART:  S1 and S2.


ABDOMEN:  Soft.  PEG tube in place.


SKIN: no rash





Laboratory Tests








Test


 10/13/20


16:16 10/13/20


20:36 10/14/20


00:08 10/14/20


04:00


 


POC Whole Blood Glucose


 Pending  


 133 MG/DL


()  H 156 MG/DL


()  H 





 


White Blood Count


 


 


 


 7.5 K/UL


(4.8-10.8)


 


Red Blood Count


 


 


 


 2.29 M/UL


(4.20-5.40)  L


 


Hemoglobin


 


 


 


 7.1 G/DL


(12.0-16.0)  L


 


Hematocrit


 


 


 


 21.7 %


(37.0-47.0)  L


 


Mean Corpuscular Volume    95 FL (80-99)  


 


Mean Corpuscular Hemoglobin


 


 


 


 31.0 PG


(27.0-31.0)


 


Mean Corpuscular Hemoglobin


Concent 


 


 


 32.7 G/DL


(32.0-36.0)


 


Red Cell Distribution Width


 


 


 


 17.6 %


(11.6-14.8)  H


 


Platelet Count


 


 


 


 94 K/UL


(150-450)  L


 


Mean Platelet Volume


 


 


 


 9.4 FL


(6.5-10.1)


 


Neutrophils (%) (Auto)


 


 


 


 % (45.0-75.0)





 


Lymphocytes (%) (Auto)


 


 


 


 % (20.0-45.0)





 


Monocytes (%) (Auto)     % (1.0-10.0)  


 


Eosinophils (%) (Auto)     % (0.0-3.0)  


 


Basophils (%) (Auto)     % (0.0-2.0)  


 


Differential Total Cells


Counted 


 


 


 100  





 


Neutrophils % (Manual)    67 % (45-75)  


 


Lymphocytes % (Manual)    26 % (20-45)  


 


Monocytes % (Manual)    5 % (1-10)  


 


Eosinophils % (Manual)    1 % (0-3)  


 


Basophils % (Manual)    0 % (0-2)  


 


Band Neutrophils    1 % (0-8)  


 


Platelet Estimate    Decreased  L


 


Platelet Morphology    Normal  


 


Anisocytosis    1+  


 


Sodium Level


 


 


 


 143 MMOL/L


(136-145)


 


Potassium Level


 


 


 


 3.9 MMOL/L


(3.5-5.1)


 


Chloride Level


 


 


 


 111 MMOL/L


()  H


 


Carbon Dioxide Level


 


 


 


 26 MMOL/L


(21-32)


 


Anion Gap


 


 


 


 6 mmol/L


(5-15)


 


Blood Urea Nitrogen


 


 


 


 15 mg/dL


(7-18)


 


Creatinine


 


 


 


 0.5 MG/DL


(0.55-1.30)  L


 


Estimat Glomerular Filtration


Rate 


 


 


 > 60 mL/min


(>60)


 


Glucose Level


 


 


 


 177 MG/DL


()  H


 


Calcium Level


 


 


 


 7.3 MG/DL


(8.5-10.1)  L


 


Test


 10/14/20


05:24 10/14/20


08:55 10/14/20


11:44 





 


POC Whole Blood Glucose


 Pending  


 92 MG/DL


() 108 MG/DL


()  H 














Current Medications








 Medications


  (Trade)  Dose


 Ordered  Sig/Didi


 Route


 PRN Reason  Start Time


 Stop Time Status Last Admin


Dose Admin


 


 Acetaminophen


  (Tylenol)  650 mg  Q4H  PRN


 GT


 For Pain  9/23/20 17:15


 10/23/20 17:14  10/2/20 17:46





 


 Chlorhexidine


 Gluconate


  (Beatrice-Hex 2%)  1 applic  DAILY@2000


 TOPIC


   8/31/20 20:00


 11/29/20 19:59  10/13/20 20:27





 


 Clotrimazole


  (Lotrimin)  1 applic  Q12HR


 TOPIC


   8/30/20 13:00


 11/28/20 12:59  10/14/20 09:00





 


 Dextrose


  (Dextrose 50%)  25 ml  Q30M  PRN


 IV


 Hypoglycemia  8/26/20 11:30


 11/20/20 11:29   





 


 Dextrose


  (Dextrose 50%)  50 ml  Q30M  PRN


 IV


 Hypoglycemia  8/26/20 11:30


 11/20/20 11:29   





 


 Hydrocortisone


  (Solu-CORTEF)  25 mg  Q12H


 IV


   10/9/20 22:00


 1/7/21 21:59  10/14/20 10:20





 


 Insulin Aspart


  (NovoLOG)    Q6HR


 SUBQ


   9/18/20 12:00


 12/17/20 11:59  10/14/20 06:05





 


 Insulin Detemir


  (Levemir)  10 units  Q12HR


 SUBQ


   9/18/20 10:00


 12/17/20 09:59  10/13/20 20:38





 


 Levothyroxine


 Sodium


  (Synthroid)  75 mcg  DAILY@0630


 GT


   9/30/20 06:30


 10/30/20 06:29  10/14/20 06:05





 


 Loperamide HCl


  (Imodium)  2 mg  Q6H  PRN


 NG


 Diarrhea  9/16/20 10:30


 10/16/20 10:29  9/23/20 11:41





 


 Pantoprazole


  (Protonix)  40 mg  EVERY 12  HOURS


 IVP


   9/28/20 21:00


 10/28/20 20:59  10/14/20 08:57





 


 Potassium Chloride


  (K-Dur)  40 meq  EVERY 12  HOURS


 GT


   9/26/20 21:00


 12/19/20 12:14  10/14/20 08:57




















Rema Gomes M.D.            Oct 14, 2020 12:03

## 2020-10-14 NOTE — NUR
NURSE NOTES:

during body assessment noted pt. to have swollen labia to left side- will continue to  
monitor pt. and with plan of care.

## 2020-10-14 NOTE — NUR
NURSE NOTES:

Bed bath given pt with large BM to formed brown stools,kept dry and clean,pulled up and 
repositioned to sides.

## 2020-10-14 NOTE — NUR
NURSE NOTES:

Received report from Macarena Arredondo and Leida Arredondo, pt. in bed awake- with eyes open, no signs or 
symptoms of acute cardiac or respiratory distress noted, bed alarm on, side rails up x's 3 
and safety brakes engaged, bed locked in position, pt. appears to be sating well on current 
vent settings well- AC 20, , Fio2 at 45% and peep 5- no distress noted, pt. appears to 
be tolerating Vital AF 1.2 at 50cc/hr- no residual noted, Valle intact and draining to 
gravity, side rails padded for seizure precautions- no seizure activity noted- upon 
assessment, pt. appears clean and dry, CARLOS PICC intact and patent- TKO, safety measures 
continued, Aspiration precautions observed, skin precautions observed, will continue with 
plan of care. 

-------------------------------------------------------------------------------

Addendum: 10/15/20 at 0408 by ROSA MENENDEZ RN RN

-------------------------------------------------------------------------------

correction to message above to vent settings- pt. has no peep.

## 2020-10-14 NOTE — NUR
NURSE NOTES:

Received report from LAYTON Romo, patient in bed, sleeping. Noted with trach size shiley 8 
with settings of AC-20 TV-500 FiO2-45% tolerated well. In no apparent distress noted. On GT 
feeding Vital AF at 50cc on hold per LAYTON Romo,  Synthroid was given, will resume later. 
Gtube is intact patent and flushed well.  Picc line on L upper Arm , line is patent, intact 
and flushed well no fluids running from previous shift. No infiltration noted. With espinal 
catheter, draining color yellowish urine with 150ml on the bag. Safety measures in placed . 
Will continue to monitor.

## 2020-10-14 NOTE — NUR
NURSE NOTES:

Vital signs stable , no adverse reaction noted at this time after 15 minutes of transfusion 
initiated, Will continue to follow up.

## 2020-10-14 NOTE — GENERAL PROGRESS NOTE
Subjective


ROS Limited/Unobtainable:  No


Allergies:  


Coded Allergies:  


     No Known Allergies (Unverified , 1/8/19)





Objective





Last 24 Hour Vital Signs








  Date Time  Temp Pulse Resp B/P (MAP) Pulse Ox O2 Delivery O2 Flow Rate FiO2


 


10/14/20 08:00      Mechanical Ventilator  





      Mechanical Ventilator  





      Mechanical Ventilator  


 


10/14/20 08:00 98.2 59 18 109/54 (72) 100   


 


10/14/20 08:00        45


 


10/14/20 07:41  50      


 


10/14/20 07:05  50 21     45


 


10/14/20 04:00 98.9 58 20 117/56 (76) 99   


 


10/14/20 04:00  48      


 


10/14/20 04:00      Mechanical Ventilator  





      Mechanical Ventilator  





      Mechanical Ventilator  


 


10/14/20 04:00        45


 


10/14/20 03:03  49 20     45


 


10/14/20 00:25 98.3 53 21 121/60 (80) 100   


 


10/14/20 00:00      Mechanical Ventilator  





      Mechanical Ventilator  





      Mechanical Ventilator  


 


10/14/20 00:00  45      


 


10/14/20 00:00        45


 


10/13/20 23:00  48 20     45


 


10/13/20 20:00        45


 


10/13/20 20:00 97.9 50 18 125/57 (79) 100   


 


10/13/20 20:00  50      


 


10/13/20 20:00      Mechanical Ventilator  





      Mechanical Ventilator  





      Mechanical Ventilator  


 


10/13/20 19:32  53 20     45


 


10/13/20 16:58      Mechanical Ventilator  





      Mechanical Ventilator  





      Mechanical Ventilator  


 


10/13/20 16:35        45


 


10/13/20 16:35      Mechanical Ventilator  





      Mechanical Ventilator  





      Mechanical Ventilator  


 


10/13/20 16:00 97.3 48 20 102/54 (70) 100   


 


10/13/20 15:39  53      


 


10/13/20 14:50  50 20     45


 


10/13/20 12:00 97.9 53 20 114/63 (80) 97   


 


10/13/20 12:00  47      


 


10/13/20 12:00        45


 


10/13/20 12:00      Mechanical Ventilator  





      Mechanical Ventilator  





      Mechanical Ventilator  


 


10/13/20 10:53  54 20     45

















Intake and Output  


 


 10/13/20 10/14/20





 19:00 07:00


 


Intake Total 1150 ml 850 ml


 


Output Total 1800 ml 


 


Balance -650 ml 850 ml


 


  


 


Free Water 100 ml 100 ml


 


IV Total 500 ml 


 


Tube Feeding 550 ml 750 ml


 


Output Urine Total 1800 ml 


 


# Bowel Movements 1 








Laboratory Tests


10/13/20 11:04: POC Whole Blood Glucose 89


10/13/20 16:16: POC Whole Blood Glucose [Pending]


10/13/20 20:36: POC Whole Blood Glucose 133H


10/14/20 00:08: POC Whole Blood Glucose 156H


10/14/20 04:00: 


White Blood Count 7.5, Red Blood Count 2.29L, Hemoglobin 7.1L, Hematocrit 21.7L,

 Mean Corpuscular Volume 95, Mean Corpuscular Hemoglobin 31.0, Mean Corpuscular 

Hemoglobin Concent 32.7, Red Cell Distribution Width 17.6H, Platelet Count 94L, 

Mean Platelet Volume 9.4, Neutrophils (%) (Auto) , Lymphocytes (%) (Auto) , 

Monocytes (%) (Auto) , Eosinophils (%) (Auto) , Basophils (%) (Auto) , 

Differential Total Cells Counted 100, Neutrophils % (Manual) 67, Lymphocytes % 

(Manual) 26, Monocytes % (Manual) 5, Eosinophils % (Manual) 1, Basophils % 

(Manual) 0, Band Neutrophils 1, Platelet Estimate DecreasedL, Platelet Mo

rphology Normal, Anisocytosis 1+, Sodium Level 143, Potassium Level 3.9, 

Chloride Level 111H, Carbon Dioxide Level 26, Anion Gap 6, Blood Urea Nitrogen 

15, Creatinine 0.5L, Estimat Glomerular Filtration Rate > 60, Glucose Level 177H

, Calcium Level 7.3L


10/14/20 05:24: POC Whole Blood Glucose [Pending]


10/14/20 08:55: POC Whole Blood Glucose 92


Height (Feet):  5


Height (Inches):  3.00


Weight (Pounds):  173


General Appearance:  no apparent distress


EENT:  normal ENT inspection


Neck:  supple


Cardiovascular:  normal rate


Respiratory/Chest:  decreased breath sounds


Abdomen:  normal bowel sounds, non tender, soft


Extremities:  non-tender





Assessment/Plan


Status:  stable, unchanged


Assessment/Plan:


1. History of Down syndrome.


2. Dysphagia with G-tube.


3. Seizure disorder.


4. Hypothyroidism.


5. DAVID.


6. Pneumonia.


7. Sepsis.








fu H&H


prn blood transfusion to keep HGB above 7>>> one unit today


ppi


GTF


hold GI procedures for now











Ted Nagy MD             Oct 14, 2020 10:07

## 2020-10-14 NOTE — NUR
****DISCHARGE BARRIER

[facility] WILL NOT ACCEPT PATIENT ON 45% FIO2

FIO2 NEEDS TO BE 30% OR BELOW



PLAN:

TRANSFUSION AND LASIX TODAY

## 2020-10-14 NOTE — NUR
CASE MANAGEMENT: REVIEW



10/14/20

SI: RESP FAILURE....NEW TRACH

DOWN'S SYNDROME . PNEUMOTHORAX...S/P CHEST TUBE

98.2   51  18  109/54  100% ON 45% FIO2

H/H-7.1/21.7      PLT-94







IS: TRANSFUSE 1 UNIT PRBC'S

IV SOLUCORTEF Q12

IV PROTONIX Q12

K-DUR GT Q12

**: SDU

DCP: PATIENT IS FROM [facility]

## 2020-10-14 NOTE — NUR
NURSE NOTES:

 PRBC initiated and running, no s/s transfusion noted at this time, Will continue to monitor

## 2020-10-14 NOTE — NUR
NURSE HAND-OFF REPORT: 



Important Events on Shift: None

Patient Status: Stable 

Diet: Vital @ 50ml/hr



Pending Orders: none

Pending Results/Labs: none 

Pending MD notification: none 



Latest Vital Signs: Temperature 98.9 , Pulse 58 , B/P 117 /56 , Respiratory Rate 20 , O2 SAT 
99 , Mechanical Ventilator, O2 Flow Rate 15.0 .  

Vital Sign Comment: stable



EKG Rhythm: Sinus Bradycardia

Rhythm change?: N 

MD Notified?: Y -Dr Mehul MENDENHALL Response: No New Orders Received



Latest Momin Fall Score: 70  

Fall Risk: High Risk 

Safety Measures: Call light Within Reach, Bed Alarm Zone 1, Side Rails Side Rails x3, Bed 
position Low and Locked.

Fall Precautions: yes 

Yellow Socks yes 



Report given to Macarena BARR RN.

## 2020-10-14 NOTE — NUR
NURSE NOTES:

Blood transfusion completed. Patient is stable , vital signs wnl. No allergic reaction 
noted.

## 2020-10-14 NOTE — INTERNAL MED PROGRESS NOTE
Subjective


Date of Service:  Oct 14, 2020


Physician Name


Sanya Schultz


Attending Physician


Chano Cherry MD





Current Medications








 Medications


  (Trade)  Dose


 Ordered  Sig/Didi


 Route


 PRN Reason  Start Time


 Stop Time Status Last Admin


Dose Admin


 


 Acetaminophen


  (Tylenol)  650 mg  Q4H  PRN


 GT


 For Pain  9/23/20 17:15


 10/23/20 17:14  10/2/20 17:46





 


 Chlorhexidine


 Gluconate


  (Beatrice-Hex 2%)  1 applic  DAILY@2000


 TOPIC


   8/31/20 20:00


 11/29/20 19:59  10/13/20 20:27





 


 Clotrimazole


  (Lotrimin)  1 applic  Q12HR


 TOPIC


   8/30/20 13:00


 11/28/20 12:59  10/14/20 09:00





 


 Dextrose


  (Dextrose 50%)  25 ml  Q30M  PRN


 IV


 Hypoglycemia  8/26/20 11:30


 11/20/20 11:29   





 


 Dextrose


  (Dextrose 50%)  50 ml  Q30M  PRN


 IV


 Hypoglycemia  8/26/20 11:30


 11/20/20 11:29   





 


 Hydrocortisone


  (Solu-CORTEF)  25 mg  Q12H


 IV


   10/9/20 22:00


 1/7/21 21:59  10/14/20 10:20





 


 Insulin Aspart


  (NovoLOG)    Q6HR


 SUBQ


   9/18/20 12:00


 12/17/20 11:59  10/14/20 06:05





 


 Insulin Detemir


  (Levemir)  10 units  Q12HR


 SUBQ


   9/18/20 10:00


 12/17/20 09:59  10/13/20 20:38





 


 Levothyroxine


 Sodium


  (Synthroid)  75 mcg  DAILY@0630


 GT


   9/30/20 06:30


 10/30/20 06:29  10/14/20 06:05





 


 Loperamide HCl


  (Imodium)  2 mg  Q6H  PRN


 NG


 Diarrhea  9/16/20 10:30


 10/16/20 10:29  9/23/20 11:41





 


 Pantoprazole


  (Protonix)  40 mg  EVERY 12  HOURS


 IVP


   9/28/20 21:00


 10/28/20 20:59  10/14/20 08:57





 


 Potassium Chloride


  (K-Dur)  40 meq  EVERY 12  HOURS


 GT


   9/26/20 21:00


 12/19/20 12:14  10/14/20 08:57











Allergies:  


Coded Allergies:  


     No Known Allergies (Unverified , 1/8/19)


ROS Limited/Unobtainable:  Yes


Subjective


58 YO F with Down's syndrome admitted with hypoxia.  Now sepsis and pneumonia.  

Cover for Int Med-DR Hung.  Step Down Unit





Objective





Last Vital Signs








  Date Time  Temp Pulse Resp B/P (MAP) Pulse Ox O2 Delivery O2 Flow Rate FiO2


 


10/14/20 12:00        45


 


10/14/20 12:00      Mechanical Ventilator  





      Mechanical Ventilator  





      Mechanical Ventilator  


 


10/14/20 12:00 98.1 50 21 104/72 (83) 100   











Laboratory Tests








Test


 10/13/20


16:16 10/13/20


20:36 10/14/20


00:08 10/14/20


04:00


 


POC Whole Blood Glucose


 Pending  


 133 MG/DL


()  H 156 MG/DL


()  H 





 


White Blood Count


 


 


 


 7.5 K/UL


(4.8-10.8)


 


Red Blood Count


 


 


 


 2.29 M/UL


(4.20-5.40)  L


 


Hemoglobin


 


 


 


 7.1 G/DL


(12.0-16.0)  L


 


Hematocrit


 


 


 


 21.7 %


(37.0-47.0)  L


 


Mean Corpuscular Volume    95 FL (80-99)  


 


Mean Corpuscular Hemoglobin


 


 


 


 31.0 PG


(27.0-31.0)


 


Mean Corpuscular Hemoglobin


Concent 


 


 


 32.7 G/DL


(32.0-36.0)


 


Red Cell Distribution Width


 


 


 


 17.6 %


(11.6-14.8)  H


 


Platelet Count


 


 


 


 94 K/UL


(150-450)  L


 


Mean Platelet Volume


 


 


 


 9.4 FL


(6.5-10.1)


 


Neutrophils (%) (Auto)


 


 


 


 % (45.0-75.0)





 


Lymphocytes (%) (Auto)


 


 


 


 % (20.0-45.0)





 


Monocytes (%) (Auto)     % (1.0-10.0)  


 


Eosinophils (%) (Auto)     % (0.0-3.0)  


 


Basophils (%) (Auto)     % (0.0-2.0)  


 


Differential Total Cells


Counted 


 


 


 100  





 


Neutrophils % (Manual)    67 % (45-75)  


 


Lymphocytes % (Manual)    26 % (20-45)  


 


Monocytes % (Manual)    5 % (1-10)  


 


Eosinophils % (Manual)    1 % (0-3)  


 


Basophils % (Manual)    0 % (0-2)  


 


Band Neutrophils    1 % (0-8)  


 


Platelet Estimate    Decreased  L


 


Platelet Morphology    Normal  


 


Anisocytosis    1+  


 


Sodium Level


 


 


 


 143 MMOL/L


(136-145)


 


Potassium Level


 


 


 


 3.9 MMOL/L


(3.5-5.1)


 


Chloride Level


 


 


 


 111 MMOL/L


()  H


 


Carbon Dioxide Level


 


 


 


 26 MMOL/L


(21-32)


 


Anion Gap


 


 


 


 6 mmol/L


(5-15)


 


Blood Urea Nitrogen


 


 


 


 15 mg/dL


(7-18)


 


Creatinine


 


 


 


 0.5 MG/DL


(0.55-1.30)  L


 


Estimat Glomerular Filtration


Rate 


 


 


 > 60 mL/min


(>60)


 


Glucose Level


 


 


 


 177 MG/DL


()  H


 


Calcium Level


 


 


 


 7.3 MG/DL


(8.5-10.1)  L


 


Test


 10/14/20


05:24 10/14/20


08:55 10/14/20


11:44 10/14/20


12:25


 


POC Whole Blood Glucose


 Pending  


 92 MG/DL


() 108 MG/DL


()  H 123 MG/DL


()  H

















Intake and Output  


 


 10/13/20 10/14/20





 19:00 07:00


 


Intake Total 1150 ml 850 ml


 


Output Total 1800 ml 


 


Balance -650 ml 850 ml


 


  


 


Free Water 100 ml 100 ml


 


IV Total 500 ml 


 


Tube Feeding 550 ml 750 ml


 


Output Urine Total 1800 ml 


 


# Bowel Movements 1 








Objective


General Appearance:  WD/WN, no apparent distress, alert


EENT:  PERRL/EOMI, normal ENT inspection


Neck:  non-tender, normal alignment, supple, normal inspection


Cardiovascular:  normal peripheral pulses, normal rate, regular rhythm, no 

gallop/murmur, no JVD


Respiratory/Chest:  Mech vent; decreased breath sounds, crackles/rales, rhonchi 

- bilaterally, expiratory wheezing


Abdomen:  normal bowel sounds, non tender, soft, no organomegaly, no mass


Extremities:  normal range of motion


Neurologic:  CNs II-XII grossly normal


Skin:  normal pigmentation, warm/dry





Assessment/Plan


Problem List:  


(1) HCAP (healthcare-associated pneumonia)


Assessment & Plan:  Strep Group G.  S/P amikacin per ID=Dr Gomes.  

Pulmonary/Critical care=DR Cherry.  COVID NEG





(2) Sepsis


Assessment & Plan:  Staph haemolyticus.  S/P amikacin per ID=Dr Gomes





(3) Down's syndrome


(4) Dysphagia


Assessment & Plan:  S/P PEG





(5) Seizure disorder


Assessment & Plan:  Continue keppra and depakote





(6) Hypothyroidism


Assessment & Plan:  Continue synthroid





(7) Acute respiratory failure


Assessment & Plan:  Pulmonary = Dr Cherry; [facility] vent





(8) Pneumothorax, right


Assessment & Plan:  Continue chest tube per pulmonary





(9) Cardiac arrest


Assessment & Plan:  8/26/20-see cardiology note=Dr Jorgensen





(10) Severe anemia


Assessment & Plan:  S/P transfusion 4 units PRBC














Sanya Schultz MD               Oct 14, 2020 12:53

## 2020-10-14 NOTE — NEPHROLOGY PROGRESS NOTE
Assessment/Plan


Problem List:  


(1) DAVID (acute kidney injury)


(2) Respiratory failure requiring intubation


(3) Down's syndrome


(4) Seizure disorder


(5) Hypothyroidism


Assessment





Acute renal failure, likely due to hypotension


Acute respiratory distress, hypoxia


Seizure disorder


Hypothyroidism


Down syndrome


Full code











Fluid challenge with IV fluids and albumin


Midodrine for BP above 100 systolic


Check TSH level


Check


Correct level


Monitor renal parameters


Urine studies


Per orders


Plan


October 14: Labs reviewed.  Electrolyte abnormalities addressed.  Hemoglobin 

lower.  I suggest transfusion which I am deferring to PMD.  Continue to monitor 

renal parameters.


October 13: Labs reviewed.  Magnesium supplement given.  Hemoglobin 7.4.  Defer 

transfusion to PMD.  Continue to monitor electrolytes and renal parameters


October 12: Due discontinuation of chest tube today.  Trach to vent.  Renal 

parameters stable.


October 11: Trach to vent.  Left chest tube to drain.  Full code.  Labs 

reviewed.  Renal parameters stable.


October 10: Trached and vent.  Still has left chest tube to drain.  No chemistry

panel today.  Continue per consultants.  Patient full code.


October 9: Patient now has trach connected to vent.  Left chest tube remains in 

place.  Patient full code.  Continue per consultants.  Labs reviewed.


October 8: Discussed with RN.  Remains on ventilator.  Labs reviewed.  Stable 

from renal standpoint of view.  Patient due for tracheostomy when clinically 

stable.


October 7: On ventilator.  Left chest tube remains.  Full code.  Labs reviewed. 

Electrolyte abnormalities addressed.  Continue per consultants.


October 6: On ventilator.  Tracheostomy postponed because patient is clinically 

unstable.  Still have left chest tube draining.  Labs reviewed.  Electrolyte 

abnormalities addressed.  Continue per consultants.


October 5: Remains intubated on ventilator.  Discussed with RN.  Unclear if the 

patient is due for tracheostomy today or not.  Apparently the patient was 

reintubated again yesterday.  Patient has left chest tube.  Electrolyte 

abnormalities addressed.


October 4: Remains intubated on ventilator.  Due for tracheostomy tomorrow.  

Left chest tube present.  Patient is full code.  Labs reviewed.  Continue as is.


October 3: Remains intubated on ventilator.  Due for tracheostomy October 5.  

Has left-sided chest tube.  Stable from renal standpoint to view.  Continue per 

consultants.


October 2: Remains intubated on ventilator.  Electrolyte abnormalities a

ddressed.  Supplements ordered.


October 1: Intubated on ventilator.  Labs reviewed.  Potassium supplement given.

 Remains bradycardic.  Continue per consultants.


September 30: Labs reviewed.  Electrolyte abnormalities addressed.  Heart rate 

remains bradycardic.  Continue per cardiology.  Continue to monitor renal 

parameters and electrolytes.


September 29: Labs reviewed.  Electrolyte abnormalities addressed and replaced. 

Medication list reviewed.  Heart rate remains mid 50s.  Continue as is.


September 28: On ventilator.  Full code.  Heart rate low.  Will discontinue 

Midodrin.  Continue to rest.  Electrolytes within normal limit.  Discussed with 

RN.


September 27: Remains intubated.  Full code.  No plan for tracheostomy yet.  

Labs reviewed.  All acceptable.  Continue same management


September 26: Labs reviewed.  Discussed with RN.  Potassium and phosphorus and 

magnesium replacement ordered.  Hemoglobin 9.5.  Patient remains full code.  

Continue per consultants.


September 25: Lab reviewed.  Renal parameters stable.  Full code.  Intubated.  

Electrolyte abnormalities addressed and replacement done.


September 24: Lab reviewed.  Renal parameters stable.  Full code.  Intubated.  

Has right side chest tube.  Continue per consultants.


September 23: Lab reviewed.  Renal parameters stable.  Full code.  Has right 

chest tube.  Planning process for tracheostomy.  Defer to chest and general 

surgeon.


September 22: Labs reviewed.  Renal parameters stable.  Remains full code.  

Remains on ventilator.  Due for tracheostomy tomorrow.  Continue per 

consultants.  Patient has a right chest tube in place at this time.


September 21: Labs reviewed.  Electrolyte imbalances addressed and supplemented.

 Remains full code and on ventilator.  Continue to monitor renal parameters.  

Continue per consultants.


September 20: Labs reviewed.  Serum potassium again low today.  Potassium 

supplement IV and through GT given.  Patient remains full code and is on 

ventilator.  Continue per consultants.  Continue to monitor electrolytes and 

renal parameters.


September 19: Labs reviewed.  Abnormal electrolyte addressed.  Remains full 

code.  Remains vented.


September 18: Day 27 of hospitalization.  Full code.  Labs reviewed.  Hemoglobin

down to 7.5.  Electrolyte abnormalities addressed and corrections ordered.  Co

ntinue to monitor renal parameters.  Continue per consultants.  Start on Levemir

for blood sugar management.  Questioning continuation of hydrocortisone?


September 17: Labs reviewed.  Potassium, phosphorus, hemoglobin, are all low.  

Potassium and phosphorus IV replacement given.  Continue to monitor electrolytes

and CBC.  Patient remains full code.


September 16: Lab reviewed.  Low phosphorus low magnesium and low potassium was 

addressed.  Hemoglobin drifting lower.  Continue per consultants.  Patient 

remains full code.


September 15: Labs reviewed.  Patient continues to be on ventilator.  D5W for 

high sodium and also potassium chloride intravenously as supplement given.  

Hemoglobin 8.4.  Continue to monitor electrolytes and renal parameters.


September 14: Labs reviewed.  Low potassium and high sodium noted.  Hemoglobin 

8.1 stable.  Aim to correct abnormal electrolyte.  Continue rest.  Will give 2 

boluses of D5W 500 cc.


September 13: Lab reviewed.  Abnormal electrolytes noted and addressed.


September 12: Labs reviewed.  Potassium supplement given.  Patient remains full 

code.  Continue per consultants.


September 11: Lab reviewed.  Electrolyte abnormalities addressed.  Continue per 

pulmonary and ID.


September 10: Lab reviewed.  Status unchanged.  Serum sodium 151 unchanged.  

Stable from renal standpoint of view.


September 9: Labs reviewed.  Status quo.  D5W 500 cc IV ordered.  Continue to 

monitor renal parameters.


September 8: Status quo.  Labs reviewed.  Overall condition unchanged.  Patient 

was transfused and hemoglobin higher.  Continue current management.  Patient 

remains full code.


September 7: Status quo.  Overall condition poor.  Very low albumin.  Edematous.

 Hypotensive.  Hemoglobin lower.  Anemia work-up ordered.  I favor transfusion 2

units of packed RBCs.  Patient remains full code.  I favor supportive care only.

 Will discuss.


September 6: Electrolyte abnormalities addressed.  Serum creatinine lower.  

Continue per current management.


September 5: Status unchanged.  Lab reviewed.  Serum potassium 2.7.  IV 

potassium chloride ordered.  Serum creatinine low at 1.6 stable.  Blood pressure

90s systolic


September 4: Status quo.  Labs reviewed.  Renal parameters stable.  Serum 

creatinine down to 1.6.  Medication list reviewed.  Continues to be on 

midodrine.  Continue per consultants.


September 3: Status quo.  Labs reviewed.  Electrolytes adjusted.  Serum 

creatinine down to 1.8.  Continue per consultants.


September 2: Status quo.  Labs reviewed.  Phosphorus supplement IV given.  Serum

creatinine 2.  Continue per consultants.


September 1: Requires less pressors.  Albumin bolus given.  1 dose of Lasix IV 

ordered as the patient severely edematous.  Patient serum albumin is very low.  

Continue per consultants.


August 31: Continues to be intubated.  Labs reviewed.  Serum creatinine 1.9 

unchanged.  Blood pressure more stable.  Off 1 of the pressors.  Continue to 

monitor renal parameters.  Continue per consultants.  Patient now on 

hydrocortisone 100 mg every 8 hours.  Will decrease IV fluid.  Normal saline 

down to 50 cc an hour.


August 30: Intubated.  Labs reviewed.  Creatinine 1.9 unchanged.  Continue same 

treatment plan.  Per consultants.  Overall poor prognosis since the patient 

remains on pressors and her pulmonary status is worsening.


August 29: Remains intubated.  Labs reviewed.  Creatinine 1.9.  Blood pressure 

systolic 90s.  Continue per consultants.


August 28: Remains intubated.  Labs reviewed.  Serum creatinine lower to 2.  

Vancomycin level lower.  Remains hypotensive on pressors.  Will increase 

midodrine to 10 mg every 8 hours.  Continue per consultants.  Continue to 

monitor renal parameters.


August 27: Patient now in ICU.  Intubated.  On pressors.  Labs reviewed.  Will 

increase midodrine.  Aim to keep blood pressure over 100 systolic.  Will give 

albumin bolus.  Will check vancomycin level which was elevated when checked 

previously on August 24.  Will monitor renal parameters.  Continue per c

onsultants.





Subjective


ROS Limited/Unobtainable:  Yes





Objective


Objective





Last 24 Hour Vital Signs








  Date Time  Temp Pulse Resp B/P (MAP) Pulse Ox O2 Delivery O2 Flow Rate FiO2


 


10/14/20 12:00        45


 


10/14/20 12:00 98.1 50 21 104/72 (83) 100   


 


10/14/20 11:13  51 20     45


 


10/14/20 08:00      Mechanical Ventilator  





      Mechanical Ventilator  





      Mechanical Ventilator  


 


10/14/20 08:00 98.2 59 18 109/54 (72) 100   


 


10/14/20 08:00        45


 


10/14/20 07:41  50      


 


10/14/20 07:05  50 21     45


 


10/14/20 04:00 98.9 58 20 117/56 (76) 99   


 


10/14/20 04:00  48      


 


10/14/20 04:00      Mechanical Ventilator  





      Mechanical Ventilator  





      Mechanical Ventilator  


 


10/14/20 04:00        45


 


10/14/20 03:03  49 20     45


 


10/14/20 00:25 98.3 53 21 121/60 (80) 100   


 


10/14/20 00:00      Mechanical Ventilator  





      Mechanical Ventilator  





      Mechanical Ventilator  


 


10/14/20 00:00  45      


 


10/14/20 00:00        45


 


10/13/20 23:00  48 20     45


 


10/13/20 20:00        45


 


10/13/20 20:00 97.9 50 18 125/57 (79) 100   


 


10/13/20 20:00  50      


 


10/13/20 20:00      Mechanical Ventilator  





      Mechanical Ventilator  





      Mechanical Ventilator  


 


10/13/20 19:32  53 20     45


 


10/13/20 16:58      Mechanical Ventilator  





      Mechanical Ventilator  





      Mechanical Ventilator  


 


10/13/20 16:35        45


 


10/13/20 16:35      Mechanical Ventilator  





      Mechanical Ventilator  





      Mechanical Ventilator  


 


10/13/20 16:00 97.3 48 20 102/54 (70) 100   


 


10/13/20 15:39  53      


 


10/13/20 14:50  50 20     45

















Intake and Output  


 


 10/13/20 10/14/20





 19:00 07:00


 


Intake Total 1150 ml 850 ml


 


Output Total 1800 ml 


 


Balance -650 ml 850 ml


 


  


 


Free Water 100 ml 100 ml


 


IV Total 500 ml 


 


Tube Feeding 550 ml 750 ml


 


Output Urine Total 1800 ml 


 


# Bowel Movements 1 











Current Medications








 Medications


  (Trade)  Dose


 Ordered  Sig/Didi


 Route


 PRN Reason  Start Time


 Stop Time Status Last Admin


Dose Admin


 


 Acetaminophen


  (Tylenol)  650 mg  Q4H  PRN


 GT


 For Pain  9/23/20 17:15


 10/23/20 17:14  10/2/20 17:46





 


 Chlorhexidine


 Gluconate


  (Beatrice-Hex 2%)  1 applic  DAILY@2000


 TOPIC


   8/31/20 20:00


 11/29/20 19:59  10/13/20 20:27





 


 Clotrimazole


  (Lotrimin)  1 applic  Q12HR


 TOPIC


   8/30/20 13:00


 11/28/20 12:59  10/14/20 09:00





 


 Dextrose


  (Dextrose 50%)  25 ml  Q30M  PRN


 IV


 Hypoglycemia  8/26/20 11:30


 11/20/20 11:29   





 


 Dextrose


  (Dextrose 50%)  50 ml  Q30M  PRN


 IV


 Hypoglycemia  8/26/20 11:30


 11/20/20 11:29   





 


 Hydrocortisone


  (Solu-CORTEF)  25 mg  Q12H


 IV


   10/9/20 22:00


 1/7/21 21:59  10/14/20 10:20





 


 Insulin Aspart


  (NovoLOG)    Q6HR


 SUBQ


   9/18/20 12:00


 12/17/20 11:59  10/14/20 06:05





 


 Insulin Detemir


  (Levemir)  10 units  Q12HR


 SUBQ


   9/18/20 10:00


 12/17/20 09:59  10/13/20 20:38





 


 Levothyroxine


 Sodium


  (Synthroid)  75 mcg  DAILY@0630


 GT


   9/30/20 06:30


 10/30/20 06:29  10/14/20 06:05





 


 Loperamide HCl


  (Imodium)  2 mg  Q6H  PRN


 NG


 Diarrhea  9/16/20 10:30


 10/16/20 10:29  9/23/20 11:41





 


 Pantoprazole


  (Protonix)  40 mg  EVERY 12  HOURS


 IVP


   9/28/20 21:00


 10/28/20 20:59  10/14/20 08:57





 


 Potassium Chloride


  (K-Dur)  40 meq  EVERY 12  HOURS


 GT


   9/26/20 21:00


 12/19/20 12:14  10/14/20 08:57








Laboratory Tests


10/13/20 16:16: POC Whole Blood Glucose [Pending]


10/13/20 20:36: POC Whole Blood Glucose 133H


10/14/20 00:08: POC Whole Blood Glucose 156H


10/14/20 04:00: 


White Blood Count 7.5, Red Blood Count 2.29L, Hemoglobin 7.1L, Hematocrit 21.7L,

 Mean Corpuscular Volume 95, Mean Corpuscular Hemoglobin 31.0, Mean Corpuscular 

Hemoglobin Concent 32.7, Red Cell Distribution Width 17.6H, Platelet Count 94L, 

Mean Platelet Volume 9.4, Neutrophils (%) (Auto) , Lymphocytes (%) (Auto) , 

Monocytes (%) (Auto) , Eosinophils (%) (Auto) , Basophils (%) (Auto) , 

Differential Total Cells Counted 100, Neutrophils % (Manual) 67, Lymphocytes % 

(Manual) 26, Monocytes % (Manual) 5, Eosinophils % (Manual) 1, Basophils % 

(Manual) 0, Band Neutrophils 1, Platelet Estimate DecreasedL, Platelet 

Morphology Normal, Anisocytosis 1+, Sodium Level 143, Potassium Level 3.9, 

Chloride Level 111H, Carbon Dioxide Level 26, Anion Gap 6, Blood Urea Nitrogen 

15, Creatinine 0.5L, Estimat Glomerular Filtration Rate > 60, Glucose Level 177H

, Calcium Level 7.3L


10/14/20 05:24: POC Whole Blood Glucose [Pending]


10/14/20 08:55: POC Whole Blood Glucose 92


10/14/20 11:44: POC Whole Blood Glucose 108H


10/14/20 12:25: POC Whole Blood Glucose 123H


Height (Feet):  5


Height (Inches):  3.00


Weight (Pounds):  173


General Appearance:  no apparent distress


EENT:  other - Trach to vent


Cardiovascular:  normal rate


Respiratory/Chest:  decreased breath sounds


Abdomen:  distended











Lam Nino MD            Oct 14, 2020 12:38

## 2020-10-15 VITALS — DIASTOLIC BLOOD PRESSURE: 71 MMHG | SYSTOLIC BLOOD PRESSURE: 127 MMHG

## 2020-10-15 VITALS — SYSTOLIC BLOOD PRESSURE: 132 MMHG | DIASTOLIC BLOOD PRESSURE: 76 MMHG

## 2020-10-15 VITALS — SYSTOLIC BLOOD PRESSURE: 118 MMHG | DIASTOLIC BLOOD PRESSURE: 65 MMHG

## 2020-10-15 VITALS — DIASTOLIC BLOOD PRESSURE: 50 MMHG | SYSTOLIC BLOOD PRESSURE: 111 MMHG

## 2020-10-15 VITALS — DIASTOLIC BLOOD PRESSURE: 62 MMHG | SYSTOLIC BLOOD PRESSURE: 114 MMHG

## 2020-10-15 VITALS — SYSTOLIC BLOOD PRESSURE: 118 MMHG | DIASTOLIC BLOOD PRESSURE: 69 MMHG

## 2020-10-15 LAB
ADD MANUAL DIFF: NO
ALBUMIN SERPL-MCNC: 1.4 G/DL (ref 3.4–5)
ALBUMIN/GLOB SERPL: 0.4 {RATIO} (ref 1–2.7)
ALP SERPL-CCNC: 75 U/L (ref 46–116)
ALT SERPL-CCNC: 36 U/L (ref 12–78)
ANION GAP SERPL CALC-SCNC: 5 MMOL/L (ref 5–15)
AST SERPL-CCNC: 24 U/L (ref 15–37)
BASOPHILS NFR BLD AUTO: 0.4 % (ref 0–2)
BILIRUB SERPL-MCNC: 0.4 MG/DL (ref 0.2–1)
BUN SERPL-MCNC: 16 MG/DL (ref 7–18)
CALCIUM SERPL-MCNC: 7.2 MG/DL (ref 8.5–10.1)
CHLORIDE SERPL-SCNC: 110 MMOL/L (ref 98–107)
CO2 SERPL-SCNC: 28 MMOL/L (ref 21–32)
CREAT SERPL-MCNC: 0.6 MG/DL (ref 0.55–1.3)
EOSINOPHIL NFR BLD AUTO: 0.9 % (ref 0–3)
ERYTHROCYTE [DISTWIDTH] IN BLOOD BY AUTOMATED COUNT: 17.7 % (ref 11.6–14.8)
GLOBULIN SER-MCNC: 3.4 G/DL
HCT VFR BLD CALC: 26 % (ref 37–47)
HGB BLD-MCNC: 8.8 G/DL (ref 12–16)
LYMPHOCYTES NFR BLD AUTO: 39 % (ref 20–45)
MCV RBC AUTO: 92 FL (ref 80–99)
MONOCYTES NFR BLD AUTO: 5.4 % (ref 1–10)
NEUTROPHILS NFR BLD AUTO: 54.3 % (ref 45–75)
PHOSPHATE SERPL-MCNC: 2.9 MG/DL (ref 2.5–4.9)
PLATELET # BLD: 101 K/UL (ref 150–450)
POTASSIUM SERPL-SCNC: 3.8 MMOL/L (ref 3.5–5.1)
RBC # BLD AUTO: 2.84 M/UL (ref 4.2–5.4)
SODIUM SERPL-SCNC: 143 MMOL/L (ref 136–145)
WBC # BLD AUTO: 8.5 K/UL (ref 4.8–10.8)

## 2020-10-15 RX ADMIN — PANTOPRAZOLE SODIUM SCH MG: 40 INJECTION, POWDER, FOR SOLUTION INTRAVENOUS at 09:20

## 2020-10-15 RX ADMIN — HYDROCORTISONE SODIUM SUCCINATE SCH MG: 100 INJECTION, POWDER, FOR SOLUTION INTRAMUSCULAR; INTRAVENOUS at 21:14

## 2020-10-15 RX ADMIN — INSULIN ASPART SCH UNITS: 100 INJECTION, SOLUTION INTRAVENOUS; SUBCUTANEOUS at 18:00

## 2020-10-15 RX ADMIN — CHLORHEXIDINE GLUCONATE SCH APPLIC: 213 SOLUTION TOPICAL at 20:08

## 2020-10-15 RX ADMIN — INSULIN DETEMIR SCH UNITS: 100 INJECTION, SOLUTION SUBCUTANEOUS at 20:08

## 2020-10-15 RX ADMIN — PANTOPRAZOLE SODIUM SCH MG: 40 INJECTION, POWDER, FOR SOLUTION INTRAVENOUS at 20:09

## 2020-10-15 RX ADMIN — INSULIN ASPART SCH UNITS: 100 INJECTION, SOLUTION INTRAVENOUS; SUBCUTANEOUS at 11:43

## 2020-10-15 RX ADMIN — INSULIN ASPART SCH UNITS: 100 INJECTION, SOLUTION INTRAVENOUS; SUBCUTANEOUS at 05:13

## 2020-10-15 RX ADMIN — INSULIN DETEMIR SCH UNITS: 100 INJECTION, SOLUTION SUBCUTANEOUS at 09:23

## 2020-10-15 RX ADMIN — HYDROCORTISONE SODIUM SUCCINATE SCH MG: 100 INJECTION, POWDER, FOR SOLUTION INTRAMUSCULAR; INTRAVENOUS at 09:31

## 2020-10-15 NOTE — NUR
NURSE NOTES:

Report received from Elis Riley RN.Pt sleeping quietly in bed ,noted no resp distress 
with trach tube to vent ,ordered vent settings tolerated,GTF Vital AF1.2 at 50 ml/hr ,no 
residual noted,Valle cath draining yellow urine,skin warm and dry , IV site to CARLOS PICC line 
intact and drsg is clean and updated, SR up x2 HOB elevated ,bed lock in lowest 
position,will continue with plans of care.

## 2020-10-15 NOTE — NUR
CASE MANAGEMENT: REVIEW



10/15/20



SI: RESP FAILURE....NEW TRACH

DOWN'S SYNDROME . PNEUMOTHORAX...S/P CHEST TUBE

VS: T 97.9 HR 51 RR 18 B/P 111/50 SATS 96% ON MECH VENT FIO2 45 

LABS: HGB 8.8 HCT 26    CA 7.2 



IS: IV SOLU CORTEF Q12

IV PROTONIX Q12H

K-DUR GT Q12H



**: SDU



DCP: PATIENT IS FROM [facility]

## 2020-10-15 NOTE — INFECTIOUS DISEASES PROG NOTE
Assessment/Plan





ASSESSMENT:


sp code blue 8/26


Septic Shock; SP


Fever, recurrent- low grade -SP


Leukocytosis; recurrent; SP


     10/5 u/a 10-25, nit neg, leuk +2;  ucx PsA (I Genta); colonizer


            sp cx P, mirabilsi (R levo); colonzier


     -9/19 Bcx Neg


   -9/9 u/a no pyuria


   -9/8 Bcx Neg(Picc line)


   -9/6 Bcx Neg


            ucx Neg


             sp cx C. parapsilopsis


  -8/26 u/a no pyuria





Pneumonia.- COVID 19 neg x3


   Acute hypoxic resp failure on VM> NRB 15l 100%; hypoxic on ABG> now VDRF 

8/26- Fio2 80% >100% 8/28> 60% 9/1 >80% 9/2   >95% 9/10 >90% 9/14 >60% 9/15


R tension Pneumothroax sp CT 9/21 


    10/9 sp trach


      10/9 CXR:   Diffuse airspace opacities.


      10/8 CXR: bilateral infiltrates are all unchanged.


      10/5 CXR: Extensive bilateral airspace opacities, right greater than left,

 consistent with multifocal infiltrate worse pulmonary edema.  This is  similar 

in appearance to the prior study. Worsening, small left pleural effusion.  This 

may be secondary to  redistribution as the right-sided pleural effusion has 

mildly improved. 


       -9/22 CXR: Interim complete reexpansion of right lung without evidence of

residual pneumothorax. Bilateral diffuse and extensive infiltrates. Markedly 

improved chest wall subcutaneous emphysema


       -9/21 CXR: Interim reexpansion of previously demonstrated right 

pneumothorax, status post large bore chest tube placement.


      -9/14 CXR: Improved aeration of both lungs.


       -9/5 CXR:Small bilateral pleural effusions with minor edema.  Stable 

edema with  mild worsening in the degree of pleural effusion on the right.


         -9/1 sp cx Neg


         8/31 CXR: Extensive bilateral interstitial and airspace disease appears

similar to the prior exam. Moderate to large bilateral pleural effusions appear 

unchanged.


   8/28 CXR: Increasing left upper lobe dense consolidation and likely 

increasing bilateral pleural fluid. Persistent diffuse dense consolidation 

elsewhere


            -8/26 sp cx normal resp lilliam


             -8/25 CXR: Increased atelectasis of the right lung, since prior 

exam of 3 days earlier. New or increased right pleural effusion. Increased left 

basilar consolidation and/or pleural fluid 


          -COVID Rapid PCR neg 8/23, 8/23, 8/26


          -8/22 spc x Group G strep


          -8/22 CXR:   Reduced lung volumes.  Patchy bilateral predominantly 

interstitial  pulmonary opacities.  Could be from edema and/or pneumonia.  There

is a broader differential.


 


        -legionella ag urine, blasto ab, Histo ab, HIV ab screen, JOY, ANCA neg





Persistent, high grade bacteremia-


     -8/22 Bcx 4/4 sets S. haemolyticus; 8/23 Bcx 3/4 S/ epi; 8/27 Bcx 1.4 S. 

warnerri; 8/29 Bcx Neg


     -2d echo: no vegetaions seen





          ua/ wbc 10-15, nit neg, leuk +1; ucx Neg





DAVID; 


 -supratherapeutic vanco levels





-Seizure disorder.


- Hypothyroidism.


- Down syndrome.





History of PEG tube placement.


[facility]








PLAN:


Monitor off abx unless febrile, increasing WBC and/or HD unstable





          9/14 SP Meropenem #18, IV Amikacin #7


          9/4/20 SP Daptomycin #11


          9/2 SP MIcafungin #7, Linezolid #5


   8/28 SP Azithromycin #7/7


   8/26 SP Ceftriaxone #2


   8/25 SP IV Vancomycin #4, Zosyn #4


   8/22 SP Cefepime x1, Flagyl x1





- Monitor CBC, BMP.


.f/u cx


- COVID neg x3


- Monitor chest x-ray.


-PEG/Trach care


-aspiration precautions


-discharge planning to SNF-needs to be FIO2 30% or below





Thank you, Dr. Cherry, for allowing me to participate in the care of


this patient.  I will follow the patient with you at this


hospitalization.








Discussed with RN





Subjective


Allergies:  


Coded Allergies:  


     No Known Allergies (Unverified , 1/8/19)


afebrile


no leukocytosis


off abx


discharge planning





Objective





Last 24 Hour Vital Signs








  Date Time  Temp Pulse Resp B/P (MAP) Pulse Ox O2 Delivery O2 Flow Rate FiO2


 


10/15/20 12:00        45


 


10/15/20 12:00      Mechanical Ventilator  





      Mechanical Ventilator  





      Mechanical Ventilator  


 


10/15/20 11:41 97.9 51 18 111/50 (70) 96   


 


10/15/20 11:29  49 20     45


 


10/15/20 08:00 98.1 50 20 114/62 (79) 98   


 


10/15/20 08:00        45


 


10/15/20 08:00      Mechanical Ventilator  





      Mechanical Ventilator  





      Mechanical Ventilator  


 


10/15/20 07:46  60      


 


10/15/20 07:08  54 20     45


 


10/15/20 04:00      Mechanical Ventilator  





      Mechanical Ventilator  





      Mechanical Ventilator  


 


10/15/20 04:00 97.9 59 20 132/76 (94) 100   


 


10/15/20 04:00        45


 


10/15/20 03:27  47      


 


10/15/20 02:56  45 20     45


 


10/15/20 00:00      Mechanical Ventilator  





      Mechanical Ventilator  





      Mechanical Ventilator  


 


10/15/20 00:00        45


 


10/15/20 00:00 98.1 53 20 118/69 (85) 99   


 


10/14/20 23:34  48      


 


10/14/20 23:15  59 22     45


 


10/14/20 20:00        45


 


10/14/20 20:00      Mechanical Ventilator  





      Mechanical Ventilator  





      Mechanical Ventilator  


 


10/14/20 20:00 98.1 48 18 101/50 (67) 99   


 


10/14/20 19:10  42 20     45


 


10/14/20 19:04  48      


 


10/14/20 16:00        45


 


10/14/20 16:00      Mechanical Ventilator  





      Mechanical Ventilator  





      Mechanical Ventilator  


 


10/14/20 16:00 98.1 60 19 114/61 (78) 98   


 


10/14/20 15:13  70      


 


10/14/20 15:09  49 20     45








Height (Feet):  5


Height (Inches):  3.00


Weight (Pounds):  173


HEENT:  . trach in place


CHEST:  Coarse breathing sounds.


HEART:  S1 and S2.


ABDOMEN:  Soft.  PEG tube in place.


SKIN: no rash





Laboratory Tests








Test


 10/14/20


17:13 10/14/20


20:17 10/14/20


23:34 10/15/20


04:00


 


POC Whole Blood Glucose


 141 MG/DL


()  H Pending  


 Pending  


 





 


White Blood Count


 


 


 


 8.5 K/UL


(4.8-10.8)


 


Red Blood Count


 


 


 


 2.84 M/UL


(4.20-5.40)  L


 


Hemoglobin


 


 


 


 8.8 G/DL


(12.0-16.0)  L


 


Hematocrit


 


 


 


 26.0 %


(37.0-47.0)  L


 


Mean Corpuscular Volume    92 FL (80-99)  


 


Mean Corpuscular Hemoglobin


 


 


 


 31.0 PG


(27.0-31.0)


 


Mean Corpuscular Hemoglobin


Concent 


 


 


 33.9 G/DL


(32.0-36.0)


 


Red Cell Distribution Width


 


 


 


 17.7 %


(11.6-14.8)  H


 


Platelet Count


 


 


 


 101 K/UL


(150-450)  L


 


Mean Platelet Volume


 


 


 


 9.4 FL


(6.5-10.1)


 


Neutrophils (%) (Auto)


 


 


 


 54.3 %


(45.0-75.0)


 


Lymphocytes (%) (Auto)


 


 


 


 39.0 %


(20.0-45.0)


 


Monocytes (%) (Auto)


 


 


 


 5.4 %


(1.0-10.0)


 


Eosinophils (%) (Auto)


 


 


 


 0.9 %


(0.0-3.0)


 


Basophils (%) (Auto)


 


 


 


 0.4 %


(0.0-2.0)


 


Sodium Level


 


 


 


 143 MMOL/L


(136-145)


 


Potassium Level


 


 


 


 3.8 MMOL/L


(3.5-5.1)


 


Chloride Level


 


 


 


 110 MMOL/L


()  H


 


Carbon Dioxide Level


 


 


 


 28 MMOL/L


(21-32)


 


Anion Gap


 


 


 


 5 mmol/L


(5-15)


 


Blood Urea Nitrogen


 


 


 


 16 mg/dL


(7-18)


 


Creatinine


 


 


 


 0.6 MG/DL


(0.55-1.30)


 


Estimat Glomerular Filtration


Rate 


 


 


 > 60 mL/min


(>60)


 


Glucose Level


 


 


 


 132 MG/DL


()  H


 


Calcium Level


 


 


 


 7.2 MG/DL


(8.5-10.1)  L


 


Phosphorus Level


 


 


 


 2.9 MG/DL


(2.5-4.9)


 


Magnesium Level


 


 


 


 1.9 MG/DL


(1.8-2.4)


 


Total Bilirubin


 


 


 


 0.4 MG/DL


(0.2-1.0)


 


Aspartate Amino Transf


(AST/SGOT) 


 


 


 24 U/L (15-37)





 


Alanine Aminotransferase


(ALT/SGPT) 


 


 


 36 U/L (12-78)





 


Alkaline Phosphatase


 


 


 


 75 U/L


()


 


Pro-B-Type Natriuretic Peptide


 


 


 


 988 pg/mL


(0-125)  H


 


Total Protein


 


 


 


 4.8 G/DL


(6.4-8.2)  L


 


Albumin


 


 


 


 1.4 G/DL


(3.4-5.0)  L


 


Globulin    3.4 g/dL  


 


Albumin/Globulin Ratio


 


 


 


 0.4 (1.0-2.7)


L


 


Test


 10/15/20


05:11 10/15/20


11:40 


 





 


POC Whole Blood Glucose Pending   Pending    











Current Medications








 Medications


  (Trade)  Dose


 Ordered  Sig/Didi


 Route


 PRN Reason  Start Time


 Stop Time Status Last Admin


Dose Admin


 


 Acetaminophen


  (Tylenol)  650 mg  Q4H  PRN


 GT


 For Pain  9/23/20 17:15


 10/23/20 17:14  10/2/20 17:46





 


 Chlorhexidine


 Gluconate


  (Beatrice-Hex 2%)  1 applic  DAILY@2000


 TOPIC


   8/31/20 20:00


 11/29/20 19:59  10/14/20 20:26





 


 Clotrimazole


  (Lotrimin)  1 applic  Q12HR


 TOPIC


   8/30/20 13:00


 11/28/20 12:59  10/15/20 09:21





 


 Dextrose


  (Dextrose 50%)  25 ml  Q30M  PRN


 IV


 Hypoglycemia  8/26/20 11:30


 11/20/20 11:29   





 


 Dextrose


  (Dextrose 50%)  50 ml  Q30M  PRN


 IV


 Hypoglycemia  8/26/20 11:30


 11/20/20 11:29   





 


 Hydrocortisone


  (Solu-CORTEF)  25 mg  Q12H


 IV


   10/9/20 22:00


 1/7/21 21:59  10/15/20 09:31





 


 Insulin Aspart


  (NovoLOG)    Q6HR


 SUBQ


   9/18/20 12:00


 12/17/20 11:59  10/14/20 17:31





 


 Insulin Detemir


  (Levemir)  10 units  Q12HR


 SUBQ


   9/18/20 10:00


 12/17/20 09:59  10/15/20 09:23





 


 Levothyroxine


 Sodium


  (Synthroid)  75 mcg  DAILY@0630


 GT


   9/30/20 06:30


 10/30/20 06:29  10/15/20 05:31





 


 Loperamide HCl


  (Imodium)  2 mg  Q6H  PRN


 NG


 Diarrhea  9/16/20 10:30


 10/16/20 10:29  9/23/20 11:41





 


 Pantoprazole


  (Protonix)  40 mg  EVERY 12  HOURS


 IVP


   9/28/20 21:00


 10/28/20 20:59  10/15/20 09:20





 


 Potassium Chloride


  (K-Dur)  40 meq  EVERY 12  HOURS


 GT


   9/26/20 21:00


 12/19/20 12:14  10/15/20 09:20




















Rema Gomes M.D.            Oct 15, 2020 13:15

## 2020-10-15 NOTE — NUR
NURSE NOTES:

bed bath given and linens changed, oral provided- pt. appears to be tolerating current 
feeding- no residual noted- appears to be tolerating current vent settings well - no 
distress noted- side rails padded for seizure precautions- HOB elevated- aspiration 
precautions observed- skin precautions observed- will continue to monitor pt. and with plan 
of care.

## 2020-10-15 NOTE — NUR
RD ASSESSMENT & RECOMMENDATIONS

SEE CARE ACTIVITY FOR COMPLETE ASSESSMENT



DAILY ESTIMATED NEEDS:

Needs based on CRITICAL CARE, 50kg  

22-28  kcals/kg 

6414-7310  total kcals

1.25-2  g protein/kg

  g total protein 

25-30  mL/kg

5578-4365  total fluid mLs



NUTRITION DIAGNOSIS:

Swallowing difficulty r/t dysphagia as evidenced by pt w/ Downs Syndrome,

PEG dep for all nutritional needs, now intubated, off pressor support

pending trach placement.

  



CURRENT TF:Vital AF 1.2 @ 50ml/hr x 22 hrs (On Synthroid QD)- held for procedure 





ENTERAL NUTRITION RECOMMENDATIONS:

Glucerna 1.2 @ 50ml/hr x 22 hrs  to provide 1100ml, 1320 kcal, 66g prot, 886ml free wa ter 



* Rec TF change to Glucerna 1.2 for carb control formula. Start @30ml/hr,

advance as tolerated to goal.

* Hold 1 hr before and after Synthroid med

* HOB over 30 degrees/ water flush per MD





ADDITIONAL RECOMMENDATIONS:

1) Ht of 61 inches per SNF; recalibrate bed scale for accurate CBW  

2) Add NISS: BGs elevated on Solucortef-> NOW ON NISS + LEVEMIR 

3) Monitor hemodynamic stability: OFF PRESSOR SUPPORT 

4) Wound healing: add Vit C 250mg QD + continue Marcio BID 

5) Monitor lytes, replete as needed (lytes wnl)  

6) Probiotics for diarrhea -> none noted 

.

## 2020-10-15 NOTE — INTERNAL MED PROGRESS NOTE
Subjective


Physician Name


Tyson Hung


Attending Physician


Chano Cherry MD





Current Medications








 Medications


  (Trade)  Dose


 Ordered  Sig/Didi


 Route


 PRN Reason  Start Time


 Stop Time Status Last Admin


Dose Admin


 


 Acetaminophen


  (Tylenol)  650 mg  Q4H  PRN


 GT


 For Pain  9/23/20 17:15


 10/23/20 17:14  10/2/20 17:46





 


 Chlorhexidine


 Gluconate


  (Beatrice-Hex 2%)  1 applic  DAILY@2000


 TOPIC


   8/31/20 20:00


 11/29/20 19:59  10/14/20 20:26





 


 Clotrimazole


  (Lotrimin)  1 applic  Q12HR


 TOPIC


   8/30/20 13:00


 11/28/20 12:59  10/15/20 09:21





 


 Dextrose


  (Dextrose 50%)  25 ml  Q30M  PRN


 IV


 Hypoglycemia  8/26/20 11:30


 11/20/20 11:29   





 


 Dextrose


  (Dextrose 50%)  50 ml  Q30M  PRN


 IV


 Hypoglycemia  8/26/20 11:30


 11/20/20 11:29   





 


 Hydrocortisone


  (Solu-CORTEF)  25 mg  Q12H


 IV


   10/9/20 22:00


 1/7/21 21:59  10/15/20 09:31





 


 Insulin Aspart


  (NovoLOG)    Q6HR


 SUBQ


   9/18/20 12:00


 12/17/20 11:59  10/14/20 17:31





 


 Insulin Detemir


  (Levemir)  10 units  Q12HR


 SUBQ


   9/18/20 10:00


 12/17/20 09:59  10/15/20 09:23





 


 Levothyroxine


 Sodium


  (Synthroid)  75 mcg  DAILY@0630


 GT


   9/30/20 06:30


 10/30/20 06:29  10/15/20 05:31





 


 Loperamide HCl


  (Imodium)  2 mg  Q6H  PRN


 NG


 Diarrhea  9/16/20 10:30


 10/16/20 10:29  9/23/20 11:41





 


 Pantoprazole


  (Protonix)  40 mg  EVERY 12  HOURS


 IVP


   9/28/20 21:00


 10/28/20 20:59  10/15/20 09:20





 


 Potassium Chloride


  (K-Dur)  40 meq  EVERY 12  HOURS


 GT


   9/26/20 21:00


 12/19/20 12:14  10/15/20 09:20











Allergies:  


Coded Allergies:  


     No Known Allergies (Unverified , 1/8/19)


Subjective


in PCU, ,on the vent, tracheostomy , awake, responsive, open her eyes, WBC 9.1, 

Hemoglobin 8.7





Objective





Last Vital Signs








  Date Time  Temp Pulse Resp B/P (MAP) Pulse Ox O2 Delivery O2 Flow Rate FiO2


 


10/15/20 16:00        45


 


10/15/20 16:00 96.1 59 18 118/65 (82) 98   


 


10/15/20 12:00      Mechanical Ventilator  





      Mechanical Ventilator  





      Mechanical Ventilator  











Laboratory Tests








Test


 10/14/20


17:13 10/14/20


20:17 10/14/20


23:34 10/15/20


04:00


 


POC Whole Blood Glucose


 141 MG/DL


()  H Pending  


 Pending  


 





 


White Blood Count


 


 


 


 8.5 K/UL


(4.8-10.8)


 


Red Blood Count


 


 


 


 2.84 M/UL


(4.20-5.40)  L


 


Hemoglobin


 


 


 


 8.8 G/DL


(12.0-16.0)  L


 


Hematocrit


 


 


 


 26.0 %


(37.0-47.0)  L


 


Mean Corpuscular Volume    92 FL (80-99)  


 


Mean Corpuscular Hemoglobin


 


 


 


 31.0 PG


(27.0-31.0)


 


Mean Corpuscular Hemoglobin


Concent 


 


 


 33.9 G/DL


(32.0-36.0)


 


Red Cell Distribution Width


 


 


 


 17.7 %


(11.6-14.8)  H


 


Platelet Count


 


 


 


 101 K/UL


(150-450)  L


 


Mean Platelet Volume


 


 


 


 9.4 FL


(6.5-10.1)


 


Neutrophils (%) (Auto)


 


 


 


 54.3 %


(45.0-75.0)


 


Lymphocytes (%) (Auto)


 


 


 


 39.0 %


(20.0-45.0)


 


Monocytes (%) (Auto)


 


 


 


 5.4 %


(1.0-10.0)


 


Eosinophils (%) (Auto)


 


 


 


 0.9 %


(0.0-3.0)


 


Basophils (%) (Auto)


 


 


 


 0.4 %


(0.0-2.0)


 


Sodium Level


 


 


 


 143 MMOL/L


(136-145)


 


Potassium Level


 


 


 


 3.8 MMOL/L


(3.5-5.1)


 


Chloride Level


 


 


 


 110 MMOL/L


()  H


 


Carbon Dioxide Level


 


 


 


 28 MMOL/L


(21-32)


 


Anion Gap


 


 


 


 5 mmol/L


(5-15)


 


Blood Urea Nitrogen


 


 


 


 16 mg/dL


(7-18)


 


Creatinine


 


 


 


 0.6 MG/DL


(0.55-1.30)


 


Estimat Glomerular Filtration


Rate 


 


 


 > 60 mL/min


(>60)


 


Glucose Level


 


 


 


 132 MG/DL


()  H


 


Calcium Level


 


 


 


 7.2 MG/DL


(8.5-10.1)  L


 


Phosphorus Level


 


 


 


 2.9 MG/DL


(2.5-4.9)


 


Magnesium Level


 


 


 


 1.9 MG/DL


(1.8-2.4)


 


Total Bilirubin


 


 


 


 0.4 MG/DL


(0.2-1.0)


 


Aspartate Amino Transf


(AST/SGOT) 


 


 


 24 U/L (15-37)





 


Alanine Aminotransferase


(ALT/SGPT) 


 


 


 36 U/L (12-78)





 


Alkaline Phosphatase


 


 


 


 75 U/L


()


 


Pro-B-Type Natriuretic Peptide


 


 


 


 988 pg/mL


(0-125)  H


 


Total Protein


 


 


 


 4.8 G/DL


(6.4-8.2)  L


 


Albumin


 


 


 


 1.4 G/DL


(3.4-5.0)  L


 


Globulin    3.4 g/dL  


 


Albumin/Globulin Ratio


 


 


 


 0.4 (1.0-2.7)


L


 


Test


 10/15/20


05:11 10/15/20


11:40 


 





 


POC Whole Blood Glucose Pending   Pending    

















Intake and Output  


 


 10/14/20 10/15/20





 19:00 07:00


 


Intake Total 1120 ml 600 ml


 


Output Total 2300 ml 625 ml


 


Balance -1180 ml -25 ml


 


  


 


Free Water 200 ml 100 ml


 


Tube Feeding 550 ml 500 ml


 


Blood Product 250 ml 


 


Other 120 ml 


 


Output Urine Total 2300 ml 625 ml


 


# Bowel Movements 1 1








Objective


General: awake, responsive, open eyes


HEENT: NCAT, sclera anicteric, PERRL, EOMI


Neck: Supple, tracheostomy site intact


Lungs: mechanical breath sounds decreased air at the bases,  no Wheeze, no 

rhonchi


Heart: Regular rate and rhythm, normal S1/S2, no murmurs/gallops


Abdomen: soft, not tender, not distended. + bowel sounds, Morbid Obesity, PEG 

site intact.


: Valle cath


Extremities: No Cyanosis , clubbing,  Bilateral upper extremities less edema. 

left upper extremity PICC line.


Neuro: limited secondary to patient status, unable to follow command, bilateral 

upper and lower extremities contracted.





Assessment/Plan


Assessment/Plan


Problem List:  


(1) HCAP (healthcare-associated pneumonia)


(2) Sepsis


(3) Down's syndrome


(4) Dysphagia S/P PEG


(5) Seizure disorder


(6) Hypothyroidism


(7) Acute Hypoxemic respiratory failure


(8) Persistent, high grade bacteremia-  r/o endocarditis


(9) DAVID


(10) Bilateral  pneumothorax status post chest tube placement and removal.





Plan: 


Tolerated tube feeding @ 50 cc/hr


Follow-up with laboratory and cultures


decrease Hydrocortisone 25 mg IV daily


Continue to monitor off abx 


DC Planning











Tyson Hung MD                 Oct 15, 2020 16:50

## 2020-10-15 NOTE — NUR
NURSE HAND-OFF REPORT: 



Important Events on Shift:Pt placed on Fio2 40%

Patient Status: stable

Diet: Vital AF 1.2 at 50 ml/hr per GT



Pending Orders: N/A

Pending Results/Labs:N/A

Pending MD notification:N/A



Latest Vital Signs: Temperature 96.1 , Pulse 51 , B/P 118 /65 , Respiratory Rate 20 , O2 SAT 
98 , Mechanical Ventilator, O2 Flow Rate 15.0 .  

Vital Sign Comment: stable



EKG Rhythm: SR,BBB

Rhythm change?: N 

MD Notified?: N -Dr Mehul MENDENHALL Response: No New Orders Received



Latest Momin Fall Score: 70  

Fall Risk: High Risk 

Safety Measures: Call light Within Reach, Bed Alarm Zone 1, Side Rails Side Rails x3, Bed 
position Low and Locked.

Fall Precautions: 

Yellow Socks



Report given to .camila Riley RN/Bhumi Hernandez RN

## 2020-10-15 NOTE — NUR
NURSE NOTES:

Received report from Macarena TRAYLOR. Pt is resting in bed, awake, not in distress. Tolerating 
trach to vent with the following settings AC 20, , FiO2 40%, PEEP 5 and saturating @ 
98%. SB on the cardiac monitor. GT is running Vital AF @ 50cc/hr. Skin issues were noted. 
PICC line in Left Upper arm is intact and patent. Fall precautions and aspiration 
precautions initiated. Beds are padded. HOB is elevated. Bed is locked and in lowest 
position. Call light is within reached, and bed alarms on. Will continue to monitor pt. Will 
continue with the plan of care.

## 2020-10-15 NOTE — NEPHROLOGY PROGRESS NOTE
Assessment/Plan


Problem List:  


(1) DAVID (acute kidney injury)


(2) Respiratory failure requiring intubation


(3) Down's syndrome


(4) Seizure disorder


(5) Hypothyroidism


Assessment





Acute renal failure, likely due to hypotension


Acute respiratory distress, hypoxia


Seizure disorder


Hypothyroidism


Down syndrome


Full code











Fluid challenge with IV fluids and albumin


Midodrine for BP above 100 systolic


Check TSH level


Check


Correct level


Monitor renal parameters


Urine studies


Per orders


Plan


October 15: Labs reviewed.  Renal parameters stable.  Hemoglobin higher.  

Continue her consultants.


October 14: Labs reviewed.  Electrolyte abnormalities addressed.  Hemoglobin 

lower.  I suggest transfusion which I am deferring to PMD.  Continue to monitor 

renal parameters.


October 13: Labs reviewed.  Magnesium supplement given.  Hemoglobin 7.4.  Defer 

transfusion to PMD.  Continue to monitor electrolytes and renal parameters


October 12: Due discontinuation of chest tube today.  Trach to vent.  Renal 

parameters stable.


October 11: Trach to vent.  Left chest tube to drain.  Full code.  Labs 

reviewed.  Renal parameters stable.


October 10: Trached and vent.  Still has left chest tube to drain.  No chemistry

panel today.  Continue per consultants.  Patient full code.


October 9: Patient now has trach connected to vent.  Left chest tube remains in 

place.  Patient full code.  Continue per consultants.  Labs reviewed.


October 8: Discussed with RN.  Remains on ventilator.  Labs reviewed.  Stable 

from renal standpoint of view.  Patient due for tracheostomy when clinically 

stable.


October 7: On ventilator.  Left chest tube remains.  Full code.  Labs reviewed. 

Electrolyte abnormalities addressed.  Continue per consultants.


October 6: On ventilator.  Tracheostomy postponed because patient is clinically 

unstable.  Still have left chest tube draining.  Labs reviewed.  Electrolyte 

abnormalities addressed.  Continue per consultants.


October 5: Remains intubated on ventilator.  Discussed with RN.  Unclear if the 

patient is due for tracheostomy today or not.  Apparently the patient was 

reintubated again yesterday.  Patient has left chest tube.  Electrolyte 

abnormalities addressed.


October 4: Remains intubated on ventilator.  Due for tracheostomy tomorrow.  

Left chest tube present.  Patient is full code.  Labs reviewed.  Continue as is.


October 3: Remains intubated on ventilator.  Due for tracheostomy October 5.  

Has left-sided chest tube.  Stable from renal standpoint to view.  Continue per 

consultants.


October 2: Remains intubated on ventilator.  Electrolyte abnormalities 

addressed.  Supplements ordered.


October 1: Intubated on ventilator.  Labs reviewed.  Potassium supplement given.

 Remains bradycardic.  Continue per consultants.


September 30: Labs reviewed.  Electrolyte abnormalities addressed.  Heart rate 

remains bradycardic.  Continue per cardiology.  Continue to monitor renal 

parameters and electrolytes.


September 29: Labs reviewed.  Electrolyte abnormalities addressed and replaced. 

Medication list reviewed.  Heart rate remains mid 50s.  Continue as is.


September 28: On ventilator.  Full code.  Heart rate low.  Will discontinue 

Midodrin.  Continue to rest.  Electrolytes within normal limit.  Discussed with 

RN.


September 27: Remains intubated.  Full code.  No plan for tracheostomy yet.  

Labs reviewed.  All acceptable.  Continue same management


September 26: Labs reviewed.  Discussed with RN.  Potassium and phosphorus and 

magnesium replacement ordered.  Hemoglobin 9.5.  Patient remains full code.  

Continue per consultants.


September 25: Lab reviewed.  Renal parameters stable.  Full code.  Intubated.  

Electrolyte abnormalities addressed and replacement done.


September 24: Lab reviewed.  Renal parameters stable.  Full code.  Intubated.  

Has right side chest tube.  Continue per consultants.


September 23: Lab reviewed.  Renal parameters stable.  Full code.  Has right 

chest tube.  Planning process for tracheostomy.  Defer to chest and general 

surgeon.


September 22: Labs reviewed.  Renal parameters stable.  Remains full code.  

Remains on ventilator.  Due for tracheostomy tomorrow.  Continue per 

consultants.  Patient has a right chest tube in place at this time.


September 21: Labs reviewed.  Electrolyte imbalances addressed and supplemented.

 Remains full code and on ventilator.  Continue to monitor renal parameters.  

Continue per consultants.


September 20: Labs reviewed.  Serum potassium again low today.  Potassium 

supplement IV and through GT given.  Patient remains full code and is on 

ventilator.  Continue per consultants.  Continue to monitor electrolytes and 

renal parameters.


September 19: Labs reviewed.  Abnormal electrolyte addressed.  Remains full 

code.  Remains vented.


September 18: Day 27 of hospitalization.  Full code.  Labs reviewed.  Hemoglobin

down to 7.5.  Electrolyte abnormalities addressed and corrections ordered.  

Continue to monitor renal parameters.  Continue per consultants.  Start on 

Levemir for blood sugar management.  Questioning continuation of hydrocortisone?


September 17: Labs reviewed.  Potassium, phosphorus, hemoglobin, are all low.  

Potassium and phosphorus IV replacement given.  Continue to monitor electrolytes

and CBC.  Patient remains full code.


September 16: Lab reviewed.  Low phosphorus low magnesium and low potassium was 

addressed.  Hemoglobin drifting lower.  Continue per consultants.  Patient 

remains full code.


September 15: Labs reviewed.  Patient continues to be on ventilator.  D5W for 

high sodium and also potassium chloride intravenously as supplement given.  

Hemoglobin 8.4.  Continue to monitor electrolytes and renal parameters.


September 14: Labs reviewed.  Low potassium and high sodium noted.  Hemoglobin 

8.1 stable.  Aim to correct abnormal electrolyte.  Continue rest.  Will give 2 

boluses of D5W 500 cc.


September 13: Lab reviewed.  Abnormal electrolytes noted and addressed.


September 12: Labs reviewed.  Potassium supplement given.  Patient remains full 

code.  Continue per consultants.


September 11: Lab reviewed.  Electrolyte abnormalities addressed.  Continue per 

pulmonary and ID.


September 10: Lab reviewed.  Status unchanged.  Serum sodium 151 unchanged.  

Stable from renal standpoint of view.


September 9: Labs reviewed.  Status quo.  D5W 500 cc IV ordered.  Continue to 

monitor renal parameters.


September 8: Status quo.  Labs reviewed.  Overall condition unchanged.  Patient 

was transfused and hemoglobin higher.  Continue current management.  Patient 

remains full code.


September 7: Status quo.  Overall condition poor.  Very low albumin.  Edematous.

 Hypotensive.  Hemoglobin lower.  Anemia work-up ordered.  I favor transfusion 2

units of packed RBCs.  Patient remains full code.  I favor supportive care only.

 Will discuss.


September 6: Electrolyte abnormalities addressed.  Serum creatinine lower.  Cont

inue per current management.


September 5: Status unchanged.  Lab reviewed.  Serum potassium 2.7.  IV 

potassium chloride ordered.  Serum creatinine low at 1.6 stable.  Blood pressure

90s systolic


September 4: Status quo.  Labs reviewed.  Renal parameters stable.  Serum 

creatinine down to 1.6.  Medication list reviewed.  Continues to be on 

midodrine.  Continue per consultants.


September 3: Status quo.  Labs reviewed.  Electrolytes adjusted.  Serum 

creatinine down to 1.8.  Continue per consultants.


September 2: Status quo.  Labs reviewed.  Phosphorus supplement IV given.  Serum

creatinine 2.  Continue per consultants.


September 1: Requires less pressors.  Albumin bolus given.  1 dose of Lasix IV 

ordered as the patient severely edematous.  Patient serum albumin is very low.  

Continue per consultants.


August 31: Continues to be intubated.  Labs reviewed.  Serum creatinine 1.9 

unchanged.  Blood pressure more stable.  Off 1 of the pressors.  Continue to 

monitor renal parameters.  Continue per consultants.  Patient now on 

hydrocortisone 100 mg every 8 hours.  Will decrease IV fluid.  Normal saline 

down to 50 cc an hour.


August 30: Intubated.  Labs reviewed.  Creatinine 1.9 unchanged.  Continue same 

treatment plan.  Per consultants.  Overall poor prognosis since the patient 

remains on pressors and her pulmonary status is worsening.


August 29: Remains intubated.  Labs reviewed.  Creatinine 1.9.  Blood pressure 

systolic 90s.  Continue per consultants.


August 28: Remains intubated.  Labs reviewed.  Serum creatinine lower to 2.  

Vancomycin level lower.  Remains hypotensive on pressors.  Will increase 

midodrine to 10 mg every 8 hours.  Continue per consultants.  Continue to 

monitor renal parameters.


August 27: Patient now in ICU.  Intubated.  On pressors.  Labs reviewed.  Will 

increase midodrine.  Aim to keep blood pressure over 100 systolic.  Will give 

albumin bolus.  Will check vancomycin level which was elevated when checked 

previously on August 24.  Will monitor renal parameters.  Continue per 

consultants.





Subjective


ROS Limited/Unobtainable:  Yes





Objective


Objective





Last 24 Hour Vital Signs








  Date Time  Temp Pulse Resp B/P (MAP) Pulse Ox O2 Delivery O2 Flow Rate FiO2


 


10/15/20 11:41 97.9 51 18 111/50 (70) 96   


 


10/15/20 11:29  49 20     45


 


10/15/20 08:00 98.1 50 20 114/62 (79) 98   


 


10/15/20 08:00        45


 


10/15/20 08:00      Mechanical Ventilator  





      Mechanical Ventilator  





      Mechanical Ventilator  


 


10/15/20 07:46  60      


 


10/15/20 07:08  54 20     45


 


10/15/20 04:00      Mechanical Ventilator  





      Mechanical Ventilator  





      Mechanical Ventilator  


 


10/15/20 04:00 97.9 59 20 132/76 (94) 100   


 


10/15/20 04:00        45


 


10/15/20 03:27  47      


 


10/15/20 02:56  45 20     45


 


10/15/20 00:00      Mechanical Ventilator  





      Mechanical Ventilator  





      Mechanical Ventilator  


 


10/15/20 00:00        45


 


10/15/20 00:00 98.1 53 20 118/69 (85) 99   


 


10/14/20 23:34  48      


 


10/14/20 23:15  59 22     45


 


10/14/20 20:00        45


 


10/14/20 20:00      Mechanical Ventilator  





      Mechanical Ventilator  





      Mechanical Ventilator  


 


10/14/20 20:00 98.1 48 18 101/50 (67) 99   


 


10/14/20 19:10  42 20     45


 


10/14/20 19:04  48      


 


10/14/20 16:00        45


 


10/14/20 16:00      Mechanical Ventilator  





      Mechanical Ventilator  





      Mechanical Ventilator  


 


10/14/20 16:00 98.1 60 19 114/61 (78) 98   


 


10/14/20 15:13  70      


 


10/14/20 15:09  49 20     45

















Intake and Output  


 


 10/14/20 10/15/20





 19:00 07:00


 


Intake Total 1120 ml 600 ml


 


Output Total 2300 ml 625 ml


 


Balance -1180 ml -25 ml


 


  


 


Free Water 200 ml 100 ml


 


Tube Feeding 550 ml 500 ml


 


Blood Product 250 ml 


 


Other 120 ml 


 


Output Urine Total 2300 ml 625 ml


 


# Bowel Movements 1 1








Laboratory Tests


10/14/20 12:25: POC Whole Blood Glucose 123H


10/14/20 17:13: POC Whole Blood Glucose 141H


10/14/20 20:17: POC Whole Blood Glucose [Pending]


10/14/20 23:34: POC Whole Blood Glucose [Pending]


10/15/20 04:00: 


White Blood Count 8.5, Red Blood Count 2.84L, Hemoglobin 8.8L, Hematocrit 26.0L,

 Mean Corpuscular Volume 92, Mean Corpuscular Hemoglobin 31.0, Mean Corpuscular 

Hemoglobin Concent 33.9, Red Cell Distribution Width 17.7H, Platelet Count 101L,

 Mean Platelet Volume 9.4, Neutrophils (%) (Auto) 54.3, Lymphocytes (%) (Auto) 

39.0, Monocytes (%) (Auto) 5.4, Eosinophils (%) (Auto) 0.9, Basophils (%) (Auto)

 0.4, Sodium Level 143, Potassium Level 3.8, Chloride Level 110H, Carbon Dioxide

 Level 28, Anion Gap 5, Blood Urea Nitrogen 16, Creatinine 0.6, Estimat Gl

omerular Filtration Rate > 60, Glucose Level 132H, Calcium Level 7.2L, 

Phosphorus Level 2.9, Magnesium Level 1.9, Total Bilirubin 0.4, Aspartate Amino 

Transf (AST/SGOT) 24, Alanine Aminotransferase (ALT/SGPT) 36, Alkaline 

Phosphatase 75, Pro-B-Type Natriuretic Peptide 988H, Total Protein 4.8L, Albumin

 1.4L, Globulin 3.4, Albumin/Globulin Ratio 0.4L


10/15/20 05:11: POC Whole Blood Glucose [Pending]


10/15/20 11:40: POC Whole Blood Glucose [Pending]


Height (Feet):  5


Height (Inches):  3.00


Weight (Pounds):  173


General Appearance:  no apparent distress


EENT:  other - Trach and vent


Cardiovascular:  bradycardia


Respiratory/Chest:  decreased breath sounds


Abdomen:  distended











Lam Nino MD            Oct 15, 2020 12:19

## 2020-10-15 NOTE — SURGERY PROGRESS NOTE
Surgery Progress Note


Subjective


Procedure Performed


removal left chest tube


Additional Comments


no acute events


cxr done pending upload and results


labs okay


comfortable appearing





Objective





Last 24 Hour Vital Signs








  Date Time  Temp Pulse Resp B/P (MAP) Pulse Ox O2 Delivery O2 Flow Rate FiO2


 


10/15/20 11:29  49 20     45


 


10/15/20 08:00 98.1 50 20 114/62 (79) 98   


 


10/15/20 08:00        45


 


10/15/20 08:00      Mechanical Ventilator  





      Mechanical Ventilator  





      Mechanical Ventilator  


 


10/15/20 07:46  60      


 


10/15/20 07:08  54 20     45


 


10/15/20 04:00      Mechanical Ventilator  





      Mechanical Ventilator  





      Mechanical Ventilator  


 


10/15/20 04:00 97.9 59 20 132/76 (94) 100   


 


10/15/20 04:00        45


 


10/15/20 03:27  47      


 


10/15/20 02:56  45 20     45


 


10/15/20 00:00      Mechanical Ventilator  





      Mechanical Ventilator  





      Mechanical Ventilator  


 


10/15/20 00:00        45


 


10/15/20 00:00 98.1 53 20 118/69 (85) 99   


 


10/14/20 23:34  48      


 


10/14/20 23:15  59 22     45


 


10/14/20 20:00        45


 


10/14/20 20:00      Mechanical Ventilator  





      Mechanical Ventilator  





      Mechanical Ventilator  


 


10/14/20 20:00 98.1 48 18 101/50 (67) 99   


 


10/14/20 19:10  42 20     45


 


10/14/20 19:04  48      


 


10/14/20 16:00        45


 


10/14/20 16:00      Mechanical Ventilator  





      Mechanical Ventilator  





      Mechanical Ventilator  


 


10/14/20 16:00 98.1 60 19 114/61 (78) 98   


 


10/14/20 15:13  70      


 


10/14/20 15:09  49 20     45


 


10/14/20 12:01      Mechanical Ventilator  





      Mechanical Ventilator  





      Mechanical Ventilator  


 


10/14/20 12:00        45


 


10/14/20 12:00 98.1 50 21 104/72 (83) 100   


 


10/14/20 11:44  48      








I&O











Intake and Output  


 


 10/14/20 10/15/20





 19:00 07:00


 


Intake Total 1120 ml 600 ml


 


Output Total 2300 ml 625 ml


 


Balance -1180 ml -25 ml


 


  


 


Free Water 200 ml 100 ml


 


Tube Feeding 550 ml 500 ml


 


Blood Product 250 ml 


 


Other 120 ml 


 


Output Urine Total 2300 ml 625 ml


 


# Bowel Movements 1 1








Dressing:  saturated


Cardiovascular:  RSR


Respiratory:  decreased breath sounds


Abdomen:  soft, non-tender, present bowel sounds


Extremities:  no tenderness, no cyanosis





Laboratory Tests








Test


 10/14/20


11:44 10/14/20


12:25 10/14/20


17:13 10/14/20


20:17


 


POC Whole Blood Glucose


 108 MG/DL


()  H 123 MG/DL


()  H 141 MG/DL


()  H Pending  





 


Test


 10/14/20


23:34 10/15/20


04:00 10/15/20


05:11 





 


POC Whole Blood Glucose Pending    Pending   


 


White Blood Count


 


 8.5 K/UL


(4.8-10.8) 


 





 


Red Blood Count


 


 2.84 M/UL


(4.20-5.40)  L 


 





 


Hemoglobin


 


 8.8 G/DL


(12.0-16.0)  L 


 





 


Hematocrit


 


 26.0 %


(37.0-47.0)  L 


 





 


Mean Corpuscular Volume  92 FL (80-99)    


 


Mean Corpuscular Hemoglobin


 


 31.0 PG


(27.0-31.0) 


 





 


Mean Corpuscular Hemoglobin


Concent 


 33.9 G/DL


(32.0-36.0) 


 





 


Red Cell Distribution Width


 


 17.7 %


(11.6-14.8)  H 


 





 


Platelet Count


 


 101 K/UL


(150-450)  L 


 





 


Mean Platelet Volume


 


 9.4 FL


(6.5-10.1) 


 





 


Neutrophils (%) (Auto)


 


 54.3 %


(45.0-75.0) 


 





 


Lymphocytes (%) (Auto)


 


 39.0 %


(20.0-45.0) 


 





 


Monocytes (%) (Auto)


 


 5.4 %


(1.0-10.0) 


 





 


Eosinophils (%) (Auto)


 


 0.9 %


(0.0-3.0) 


 





 


Basophils (%) (Auto)


 


 0.4 %


(0.0-2.0) 


 





 


Sodium Level


 


 143 MMOL/L


(136-145) 


 





 


Potassium Level


 


 3.8 MMOL/L


(3.5-5.1) 


 





 


Chloride Level


 


 110 MMOL/L


()  H 


 





 


Carbon Dioxide Level


 


 28 MMOL/L


(21-32) 


 





 


Anion Gap


 


 5 mmol/L


(5-15) 


 





 


Blood Urea Nitrogen


 


 16 mg/dL


(7-18) 


 





 


Creatinine


 


 0.6 MG/DL


(0.55-1.30) 


 





 


Estimat Glomerular Filtration


Rate 


 > 60 mL/min


(>60) 


 





 


Glucose Level


 


 132 MG/DL


()  H 


 





 


Calcium Level


 


 7.2 MG/DL


(8.5-10.1)  L 


 





 


Phosphorus Level


 


 2.9 MG/DL


(2.5-4.9) 


 





 


Magnesium Level


 


 1.9 MG/DL


(1.8-2.4) 


 





 


Total Bilirubin


 


 0.4 MG/DL


(0.2-1.0) 


 





 


Aspartate Amino Transf


(AST/SGOT) 


 24 U/L (15-37)


 


 





 


Alanine Aminotransferase


(ALT/SGPT) 


 36 U/L (12-78)


 


 





 


Alkaline Phosphatase


 


 75 U/L


() 


 





 


Pro-B-Type Natriuretic Peptide


 


 988 pg/mL


(0-125)  H 


 





 


Total Protein


 


 4.8 G/DL


(6.4-8.2)  L 


 





 


Albumin


 


 1.4 G/DL


(3.4-5.0)  L 


 





 


Globulin  3.4 g/dL    


 


Albumin/Globulin Ratio


 


 0.4 (1.0-2.7)


L 


 














Plan


Problems:  


(1) Respiratory distress


Assessment & Plan:  Respiratory insufficiency requiring prolonged ventilator 

support.  Patient on minimal vent settings right now currently in stable but 

unfortunately not safe for extubation.  Tracheostomy is indicated recommended.  

I discussed case with pulmonology team ICU team medical teams.  Patient unable 

to make decisions and her current condition and given her history.  The care 

team has been contacted and will be reviewed for evaluation and consideration of

the tracheostomy.  If consented I think it is reasonable for tracheostomy given 

patient's current CODE STATUS care plan and goals of care.  Extubation his 

current status is potentially high risk for reintubation emergency complication.

 We will plan for tracheostomy if consent is obtained.  Thank you for let me 

participate patient's care will follow with recommendations





will plan for trach when ready


wean [id number] 





s/p trach 10?8





(2) Encephalopathy chronic


(3) Dysphagia


(4) Down's syndrome


(5) Sepsis


Assessment & Plan:  DAILY ESTIMATED NEEDS:


Needs based on CRITICAL CARE, 50kg  


22-28  kcals/kg 


[id number]  total kcals


1.25-2  g protein/kg


  g total protein 


25-30  mL/kg


[id number]  total fluid mLs





NUTRITION DIAGNOSIS:


Swallowing difficulty r/t dysphagia as evidenced by pt w/ Downs Syndrome,


PEG dep for all nutritional needs, now intubated, off pressor support


pending trach placement.


  





CURRENT TF:Vital AF 1.2 @ 50ml/hr x 22 hrs (On Synthroid QD)- held for procedure










ENTERAL NUTRITION RECOMMENDATIONS:


Glucerna 1.2 @ 50ml/hr x 22 hrs  to provide 1100ml, 1320 kcal, 66g prot, 886ml 

free wa ter 





* Rec TF change to Glucerna 1.2 for carb control formula. Start @30ml/hr,


advance as tolerated to goal.


* Hold 1 hr before and after Synthroid med


* HOB over 30 degrees/ water flush per MD








ADDITIONAL RECOMMENDATIONS:


1) Ht of 61 inches per SN[facility]; recalibrate bed scale for accurate CBW  


2) Add NISS: BGs elevated on Solucortef-> NOW ON NISS + LEVEMIR 


3) Monitor hemodynamic stability: OFF PRESSOR SUPPORT 


4) Wound healing: add Vit C 250mg QD + continue Marcio BID 


5) Monitor lytes, replete as needed (lytes wnl)  


6) Probiotics for diarrhea -> none noted 


. 





(6) Acute respiratory failure


(7) Pneumonia


(8) Trisomy 21, Down syndrome


(9) DAVID (acute kidney injury)


(10) Bacteremia


(11) Hypokalemia


(12) Anemia


(13) Diarrhea


(14) Hypernatremia


(15) Pneumothorax


(16) Pneumothorax, right


Assessment & Plan:  right ptx.  s/p chest tube


tube removed 10/3





left ptx tube placed 10/2


left CT to water seal 


chest tube removed 10/12





(17) Cardiac arrest


(18) ARDS (adult respiratory distress syndrome)


(19) Septic shock


(20) HCAP (healthcare-associated pneumonia)


(21) Respiratory failure requiring intubation


(22) Seizure disorder


(23) Hypothyroidism











Jake Aranda                Oct 15, 2020 11:40

## 2020-10-15 NOTE — PULMONOLGY CRITICAL CARE NOTE
Critical Care - Asmt/Plan


Problems:  


(1) Acute respiratory failure


(2) Pneumothorax


(3) Bacteremia


(4) Pneumonia


(5) Sepsis


(6) HCAP (healthcare-associated pneumonia)


(7) Seizure disorder


(8) Down's syndrome


(9) Trisomy 21, Down syndrome


Respiratory:  monitor respiratory rate, adjust FIO2, CXR


Cardiac:  continue to monitor HR/BP


Renal:  F/U I&O, keep IV fluid, check electrolytes


Infectious Disease:  check cultures, continue antibiotics


Gastrointestinal:  continue feedings/current rate


Endocrine:  continue sliding scale insulin


Hematologic:  monitor H/H, transfuse if hgb<8.5


Neurologic:  PRN Ativan, keep patient comfortable


Affect:  PRN ativan


Prophylaxis:  Protonix


Time Spent (Minutes):  40


Notes Reviewed:  internist, cardio, renal


Discussed with:  nurses, consultants, 





Critical Care - Objective





Last 24 Hour Vital Signs








  Date Time  Temp Pulse Resp B/P (MAP) Pulse Ox O2 Delivery O2 Flow Rate FiO2


 


10/15/20 11:29  49 20     45


 


10/15/20 08:00 98.1 50 20 114/62 (79) 98   


 


10/15/20 08:00        45


 


10/15/20 08:00      Mechanical Ventilator  





      Mechanical Ventilator  





      Mechanical Ventilator  


 


10/15/20 07:46  60      


 


10/15/20 07:08  54 20     45


 


10/15/20 04:00      Mechanical Ventilator  





      Mechanical Ventilator  





      Mechanical Ventilator  


 


10/15/20 04:00 97.9 59 20 132/76 (94) 100   


 


10/15/20 04:00        45


 


10/15/20 03:27  47      


 


10/15/20 02:56  45 20     45


 


10/15/20 00:00      Mechanical Ventilator  





      Mechanical Ventilator  





      Mechanical Ventilator  


 


10/15/20 00:00        45


 


10/15/20 00:00 98.1 53 20 118/69 (85) 99   


 


10/14/20 23:34  48      


 


10/14/20 23:15  59 22     45


 


10/14/20 20:00        45


 


10/14/20 20:00      Mechanical Ventilator  





      Mechanical Ventilator  





      Mechanical Ventilator  


 


10/14/20 20:00 98.1 48 18 101/50 (67) 99   


 


10/14/20 19:10  42 20     45


 


10/14/20 19:04  48      


 


10/14/20 16:00        45


 


10/14/20 16:00      Mechanical Ventilator  





      Mechanical Ventilator  





      Mechanical Ventilator  


 


10/14/20 16:00 98.1 60 19 114/61 (78) 98   


 


10/14/20 15:13  70      


 


10/14/20 15:09  49 20     45


 


10/14/20 12:01      Mechanical Ventilator  





      Mechanical Ventilator  





      Mechanical Ventilator  


 


10/14/20 12:00        45


 


10/14/20 12:00 98.1 50 21 104/72 (83) 100   


 


10/14/20 11:44  48      








Status:  awake


Condition:  critical, improving


HEENT:  atraumatic


Neck:  trach


Lungs:  rales, rhonchi


Heart:  HR/BP stable


Abdomen:  soft, non-tender, active bowel sounds


Extremities:  no C/C/E


Accucheck:  122





Critical Care - Subjective


ROS Limited/Unobtainable:  Yes


Interval Events:


comfortable


Condition:  critical


EKG Rhythm:  Sinus Rhythm


FI02:  45


Vent Support Breath Rate:  20


Vent Support Mode:  AC


Vent Tidal Volume:  500


Sputum Amount:  Moderate


PEEP:  0.0


PIP:  33


Tube Feeding Amount:  25


I&O:











Intake and Output  


 


 10/14/20 10/15/20





 19:00 07:00


 


Intake Total 1120 ml 600 ml


 


Output Total 2300 ml 625 ml


 


Balance -1180 ml -25 ml


 


  


 


Free Water 200 ml 100 ml


 


Tube Feeding 550 ml 500 ml


 


Blood Product 250 ml 


 


Other 120 ml 


 


Output Urine Total 2300 ml 625 ml


 


# Bowel Movements 1 1








CXR:


extensive infiltrate present, trach intact


ET-Tube:  7.5


ET Position:  23


Labs:





Laboratory Tests








Test


 10/14/20


11:44 10/14/20


12:25 10/14/20


17:13 10/14/20


20:17


 


POC Whole Blood Glucose


 108 MG/DL


()  H 123 MG/DL


()  H 141 MG/DL


()  H Pending  





 


Test


 10/14/20


23:34 10/15/20


04:00 10/15/20


05:11 





 


POC Whole Blood Glucose Pending    Pending   


 


White Blood Count


 


 8.5 K/UL


(4.8-10.8) 


 





 


Red Blood Count


 


 2.84 M/UL


(4.20-5.40)  L 


 





 


Hemoglobin


 


 8.8 G/DL


(12.0-16.0)  L 


 





 


Hematocrit


 


 26.0 %


(37.0-47.0)  L 


 





 


Mean Corpuscular Volume  92 FL (80-99)    


 


Mean Corpuscular Hemoglobin


 


 31.0 PG


(27.0-31.0) 


 





 


Mean Corpuscular Hemoglobin


Concent 


 33.9 G/DL


(32.0-36.0) 


 





 


Red Cell Distribution Width


 


 17.7 %


(11.6-14.8)  H 


 





 


Platelet Count


 


 101 K/UL


(150-450)  L 


 





 


Mean Platelet Volume


 


 9.4 FL


(6.5-10.1) 


 





 


Neutrophils (%) (Auto)


 


 54.3 %


(45.0-75.0) 


 





 


Lymphocytes (%) (Auto)


 


 39.0 %


(20.0-45.0) 


 





 


Monocytes (%) (Auto)


 


 5.4 %


(1.0-10.0) 


 





 


Eosinophils (%) (Auto)


 


 0.9 %


(0.0-3.0) 


 





 


Basophils (%) (Auto)


 


 0.4 %


(0.0-2.0) 


 





 


Sodium Level


 


 143 MMOL/L


(136-145) 


 





 


Potassium Level


 


 3.8 MMOL/L


(3.5-5.1) 


 





 


Chloride Level


 


 110 MMOL/L


()  H 


 





 


Carbon Dioxide Level


 


 28 MMOL/L


(21-32) 


 





 


Anion Gap


 


 5 mmol/L


(5-15) 


 





 


Blood Urea Nitrogen


 


 16 mg/dL


(7-18) 


 





 


Creatinine


 


 0.6 MG/DL


(0.55-1.30) 


 





 


Estimat Glomerular Filtration


Rate 


 > 60 mL/min


(>60) 


 





 


Glucose Level


 


 132 MG/DL


()  H 


 





 


Calcium Level


 


 7.2 MG/DL


(8.5-10.1)  L 


 





 


Phosphorus Level


 


 2.9 MG/DL


(2.5-4.9) 


 





 


Magnesium Level


 


 1.9 MG/DL


(1.8-2.4) 


 





 


Total Bilirubin


 


 0.4 MG/DL


(0.2-1.0) 


 





 


Aspartate Amino Transf


(AST/SGOT) 


 24 U/L (15-37)


 


 





 


Alanine Aminotransferase


(ALT/SGPT) 


 36 U/L (12-78)


 


 





 


Alkaline Phosphatase


 


 75 U/L


() 


 





 


Pro-B-Type Natriuretic Peptide


 


 988 pg/mL


(0-125)  H 


 





 


Total Protein


 


 4.8 G/DL


(6.4-8.2)  L 


 





 


Albumin


 


 1.4 G/DL


(3.4-5.0)  L 


 





 


Globulin  3.4 g/dL    


 


Albumin/Globulin Ratio


 


 0.4 (1.0-2.7)


L 


 




















Chano Cherry MD              Oct 15, 2020 11:39

## 2020-10-15 NOTE — NUR
CASE MANAGEMENT: NOTE 



CM S/W RT REGARDING OXYGEN DEMANDS AND DC PLANNING 



RT WILL CONTINUE TO TITRATE OXYGEN 



CM S/W [facility] AT [facility] REGARDING CURRENT O2 SETTINGS >> FIO2 40 MAXIMUM

## 2020-10-15 NOTE — DIAGNOSTIC IMAGING REPORT
Indication: Dyspnea

 

Technique: One view of the chest

 

Comparison: 10/13/2020

 

Findings: Tracheostomy, left arm PICC are again demonstrated. Bilateral infiltrates

versus edema are unchanged. The heart remains enlarged.

 

Impression:  Unchanged, over 2 days, findings as above.

## 2020-10-15 NOTE — GENERAL PROGRESS NOTE
Subjective


ROS Limited/Unobtainable:  No


Allergies:  


Coded Allergies:  


     No Known Allergies (Unverified , 1/8/19)





Objective





Last 24 Hour Vital Signs








  Date Time  Temp Pulse Resp B/P (MAP) Pulse Ox O2 Delivery O2 Flow Rate FiO2


 


10/15/20 11:41 97.9 51 18 111/50 (70) 96   


 


10/15/20 11:29  49 20     45


 


10/15/20 08:00 98.1 50 20 114/62 (79) 98   


 


10/15/20 08:00        45


 


10/15/20 08:00      Mechanical Ventilator  





      Mechanical Ventilator  





      Mechanical Ventilator  


 


10/15/20 07:46  60      


 


10/15/20 07:08  54 20     45


 


10/15/20 04:00      Mechanical Ventilator  





      Mechanical Ventilator  





      Mechanical Ventilator  


 


10/15/20 04:00 97.9 59 20 132/76 (94) 100   


 


10/15/20 04:00        45


 


10/15/20 03:27  47      


 


10/15/20 02:56  45 20     45


 


10/15/20 00:00      Mechanical Ventilator  





      Mechanical Ventilator  





      Mechanical Ventilator  


 


10/15/20 00:00        45


 


10/15/20 00:00 98.1 53 20 118/69 (85) 99   


 


10/14/20 23:34  48      


 


10/14/20 23:15  59 22     45


 


10/14/20 20:00        45


 


10/14/20 20:00      Mechanical Ventilator  





      Mechanical Ventilator  





      Mechanical Ventilator  


 


10/14/20 20:00 98.1 48 18 101/50 (67) 99   


 


10/14/20 19:10  42 20     45


 


10/14/20 19:04  48      


 


10/14/20 16:00        45


 


10/14/20 16:00      Mechanical Ventilator  





      Mechanical Ventilator  





      Mechanical Ventilator  


 


10/14/20 16:00 98.1 60 19 114/61 (78) 98   


 


10/14/20 15:13  70      


 


10/14/20 15:09  49 20     45

















Intake and Output  


 


 10/14/20 10/15/20





 19:00 07:00


 


Intake Total 1120 ml 600 ml


 


Output Total 2300 ml 625 ml


 


Balance -1180 ml -25 ml


 


  


 


Free Water 200 ml 100 ml


 


Tube Feeding 550 ml 500 ml


 


Blood Product 250 ml 


 


Other 120 ml 


 


Output Urine Total 2300 ml 625 ml


 


# Bowel Movements 1 1








Laboratory Tests


10/14/20 17:13: POC Whole Blood Glucose 141H


10/14/20 20:17: POC Whole Blood Glucose [Pending]


10/14/20 23:34: POC Whole Blood Glucose [Pending]


10/15/20 04:00: 


White Blood Count 8.5, Red Blood Count 2.84L, Hemoglobin 8.8L, Hematocrit 26.0L,

 Mean Corpuscular Volume 92, Mean Corpuscular Hemoglobin 31.0, Mean Corpuscular 

Hemoglobin Concent 33.9, Red Cell Distribution Width 17.7H, Platelet Count 101L,

 Mean Platelet Volume 9.4, Neutrophils (%) (Auto) 54.3, Lymphocytes (%) (Auto) 

39.0, Monocytes (%) (Auto) 5.4, Eosinophils (%) (Auto) 0.9, Basophils (%) (Auto)

 0.4, Sodium Level 143, Potassium Level 3.8, Chloride Level 110H, Carbon Dioxide

 Level 28, Anion Gap 5, Blood Urea Nitrogen 16, Creatinine 0.6, Estimat 

Glomerular Filtration Rate > 60, Glucose Level 132H, Calcium Level 7.2L, 

Phosphorus Level 2.9, Magnesium Level 1.9, Total Bilirubin 0.4, Aspartate Amino 

Transf (AST/SGOT) 24, Alanine Aminotransferase (ALT/SGPT) 36, Alkaline 

Phosphatase 75, Pro-B-Type Natriuretic Peptide 988H, Total Protein 4.8L, Albumin

 1.4L, Globulin 3.4, Albumin/Globulin Ratio 0.4L


10/15/20 05:11: POC Whole Blood Glucose [Pending]


10/15/20 11:40: POC Whole Blood Glucose [Pending]


Height (Feet):  5


Height (Inches):  3.00


Weight (Pounds):  173


General Appearance:  no apparent distress


EENT:  normal ENT inspection


Neck:  supple


Cardiovascular:  normal rate


Respiratory/Chest:  decreased breath sounds


Abdomen:  normal bowel sounds, non tender, soft


Extremities:  non-tender





Assessment/Plan


Status:  stable, unchanged


Assessment/Plan:


1. History of Down syndrome.


2. Dysphagia with G-tube.


3. Seizure disorder.


4. Hypothyroidism.


5. DAVID.


6. Pneumonia.


7. Sepsis.








fu H&H


prn blood transfusion to keep HGB above 7>>> one unit today


ppi


GTF


hold GI procedures for now











Ted Nagy MD             Oct 15, 2020 12:32

## 2020-10-15 NOTE — CARDIOLOGY PROGRESS NOTE
Assessment/Plan


Assessment/Plan


sepsis


respiratory failure 


ards 


renal insuf 


bacteremia


abn cardiac enzyme due to demand 


sinus arnold stable 


thrombocytopenia resolved  


hypernatremia 


pneumothorax now s/p chest tube on the left now 


thrombocytopenia 








vent support 


tsh seems fine 


hr inthe 60's s no sig pauses on tele 


tele personally reviewed 


bp ok 


probnp min elelvated but is better 


cxr noted 


watch plt  


 








Subjective


ROS Limited/Unobtainable:  Yes


Subjective


on  a vent not communicative not  AWAKE





Objective





Last 24 Hour Vital Signs








  Date Time  Temp Pulse Resp B/P (MAP) Pulse Ox O2 Delivery O2 Flow Rate FiO2


 


10/15/20 19:02  51 20     40


 


10/15/20 16:00      Mechanical Ventilator  





      Mechanical Ventilator  





      Mechanical Ventilator  


 


10/15/20 16:00  72      


 


10/15/20 16:00        45


 


10/15/20 16:00 96.1 59 18 118/65 (82) 98   


 


10/15/20 15:20  52 21     40


 


10/15/20 12:01      Mechanical Ventilator  





      Mechanical Ventilator  





      Mechanical Ventilator  


 


10/15/20 12:00        45


 


10/15/20 12:00  51      


 


10/15/20 11:41 97.9 51 18 111/50 (70) 96   


 


10/15/20 11:29  49 20     45


 


10/15/20 08:01      Mechanical Ventilator  





      Mechanical Ventilator  





      Mechanical Ventilator  


 


10/15/20 08:00 98.1 50 20 114/62 (79) 98   


 


10/15/20 08:00        45


 


10/15/20 07:46  60      


 


10/15/20 07:08  54 20     45


 


10/15/20 04:00      Mechanical Ventilator  





      Mechanical Ventilator  





      Mechanical Ventilator  


 


10/15/20 04:00 97.9 59 20 132/76 (94) 100   


 


10/15/20 04:00        45


 


10/15/20 03:27  47      


 


10/15/20 02:56  45 20     45


 


10/15/20 00:00      Mechanical Ventilator  





      Mechanical Ventilator  





      Mechanical Ventilator  


 


10/15/20 00:00        45


 


10/15/20 00:00 98.1 53 20 118/69 (85) 99   


 


10/14/20 23:34  48      


 


10/14/20 23:15  59 22     45


 


10/14/20 20:00        45


 


10/14/20 20:00      Mechanical Ventilator  





      Mechanical Ventilator  





      Mechanical Ventilator  


 


10/14/20 20:00 98.1 48 18 101/50 (67) 99   








General Appearance:  no apparent distress, alert, patient on isolation











Intake and Output  


 


 10/14/20 10/15/20





 19:00 07:00


 


Intake Total 1120 ml 600 ml


 


Output Total 2300 ml 625 ml


 


Balance -1180 ml -25 ml


 


  


 


Free Water 200 ml 100 ml


 


Tube Feeding 550 ml 500 ml


 


Blood Product 250 ml 


 


Other 120 ml 


 


Output Urine Total 2300 ml 625 ml


 


# Bowel Movements 1 1











Laboratory Tests








Test


 10/14/20


20:17 10/14/20


23:34 10/15/20


04:00 10/15/20


05:11


 


POC Whole Blood Glucose Pending   Pending    Pending  


 


White Blood Count


 


 


 8.5 K/UL


(4.8-10.8) 





 


Red Blood Count


 


 


 2.84 M/UL


(4.20-5.40)  L 





 


Hemoglobin


 


 


 8.8 G/DL


(12.0-16.0)  L 





 


Hematocrit


 


 


 26.0 %


(37.0-47.0)  L 





 


Mean Corpuscular Volume   92 FL (80-99)   


 


Mean Corpuscular Hemoglobin


 


 


 31.0 PG


(27.0-31.0) 





 


Mean Corpuscular Hemoglobin


Concent 


 


 33.9 G/DL


(32.0-36.0) 





 


Red Cell Distribution Width


 


 


 17.7 %


(11.6-14.8)  H 





 


Platelet Count


 


 


 101 K/UL


(150-450)  L 





 


Mean Platelet Volume


 


 


 9.4 FL


(6.5-10.1) 





 


Neutrophils (%) (Auto)


 


 


 54.3 %


(45.0-75.0) 





 


Lymphocytes (%) (Auto)


 


 


 39.0 %


(20.0-45.0) 





 


Monocytes (%) (Auto)


 


 


 5.4 %


(1.0-10.0) 





 


Eosinophils (%) (Auto)


 


 


 0.9 %


(0.0-3.0) 





 


Basophils (%) (Auto)


 


 


 0.4 %


(0.0-2.0) 





 


Sodium Level


 


 


 143 MMOL/L


(136-145) 





 


Potassium Level


 


 


 3.8 MMOL/L


(3.5-5.1) 





 


Chloride Level


 


 


 110 MMOL/L


()  H 





 


Carbon Dioxide Level


 


 


 28 MMOL/L


(21-32) 





 


Anion Gap


 


 


 5 mmol/L


(5-15) 





 


Blood Urea Nitrogen


 


 


 16 mg/dL


(7-18) 





 


Creatinine


 


 


 0.6 MG/DL


(0.55-1.30) 





 


Estimat Glomerular Filtration


Rate 


 


 > 60 mL/min


(>60) 





 


Glucose Level


 


 


 132 MG/DL


()  H 





 


Calcium Level


 


 


 7.2 MG/DL


(8.5-10.1)  L 





 


Phosphorus Level


 


 


 2.9 MG/DL


(2.5-4.9) 





 


Magnesium Level


 


 


 1.9 MG/DL


(1.8-2.4) 





 


Total Bilirubin


 


 


 0.4 MG/DL


(0.2-1.0) 





 


Aspartate Amino Transf


(AST/SGOT) 


 


 24 U/L (15-37)


 





 


Alanine Aminotransferase


(ALT/SGPT) 


 


 36 U/L (12-78)


 





 


Alkaline Phosphatase


 


 


 75 U/L


() 





 


Pro-B-Type Natriuretic Peptide


 


 


 988 pg/mL


(0-125)  H 





 


Total Protein


 


 


 4.8 G/DL


(6.4-8.2)  L 





 


Albumin


 


 


 1.4 G/DL


(3.4-5.0)  L 





 


Globulin   3.4 g/dL   


 


Albumin/Globulin Ratio


 


 


 0.4 (1.0-2.7)


L 





 


Test


 10/15/20


11:40 10/15/20


17:53 


 





 


POC Whole Blood Glucose


 Pending  


 129 MG/DL


()  H 


 




















Constantine Jorgensen MD          Oct 15, 2020 19:36

## 2020-10-16 VITALS — SYSTOLIC BLOOD PRESSURE: 126 MMHG | DIASTOLIC BLOOD PRESSURE: 64 MMHG

## 2020-10-16 VITALS — SYSTOLIC BLOOD PRESSURE: 114 MMHG | DIASTOLIC BLOOD PRESSURE: 68 MMHG

## 2020-10-16 VITALS — SYSTOLIC BLOOD PRESSURE: 125 MMHG | DIASTOLIC BLOOD PRESSURE: 70 MMHG

## 2020-10-16 VITALS — DIASTOLIC BLOOD PRESSURE: 57 MMHG | SYSTOLIC BLOOD PRESSURE: 114 MMHG

## 2020-10-16 VITALS — SYSTOLIC BLOOD PRESSURE: 98 MMHG | DIASTOLIC BLOOD PRESSURE: 48 MMHG

## 2020-10-16 VITALS — SYSTOLIC BLOOD PRESSURE: 113 MMHG | DIASTOLIC BLOOD PRESSURE: 62 MMHG

## 2020-10-16 VITALS — DIASTOLIC BLOOD PRESSURE: 62 MMHG | SYSTOLIC BLOOD PRESSURE: 129 MMHG

## 2020-10-16 LAB
ADD MANUAL DIFF: NO
ALBUMIN SERPL-MCNC: 1.3 G/DL (ref 3.4–5)
ALBUMIN/GLOB SERPL: 0.4 {RATIO} (ref 1–2.7)
ALP SERPL-CCNC: 70 U/L (ref 46–116)
ALT SERPL-CCNC: 37 U/L (ref 12–78)
ANION GAP SERPL CALC-SCNC: 4 MMOL/L (ref 5–15)
AST SERPL-CCNC: 25 U/L (ref 15–37)
BASOPHILS NFR BLD AUTO: 1 % (ref 0–2)
BILIRUB SERPL-MCNC: 0.4 MG/DL (ref 0.2–1)
BUN SERPL-MCNC: 16 MG/DL (ref 7–18)
CALCIUM SERPL-MCNC: 7.4 MG/DL (ref 8.5–10.1)
CHLORIDE SERPL-SCNC: 111 MMOL/L (ref 98–107)
CO2 SERPL-SCNC: 28 MMOL/L (ref 21–32)
CREAT SERPL-MCNC: 0.6 MG/DL (ref 0.55–1.3)
EOSINOPHIL NFR BLD AUTO: 1.1 % (ref 0–3)
ERYTHROCYTE [DISTWIDTH] IN BLOOD BY AUTOMATED COUNT: 17.4 % (ref 11.6–14.8)
GLOBULIN SER-MCNC: 3.4 G/DL
HCT VFR BLD CALC: 25.8 % (ref 37–47)
HGB BLD-MCNC: 8.7 G/DL (ref 12–16)
LYMPHOCYTES NFR BLD AUTO: 34.2 % (ref 20–45)
MCV RBC AUTO: 92 FL (ref 80–99)
MONOCYTES NFR BLD AUTO: 7.6 % (ref 1–10)
NEUTROPHILS NFR BLD AUTO: 56 % (ref 45–75)
PLATELET # BLD: 106 K/UL (ref 150–450)
POTASSIUM SERPL-SCNC: 3.8 MMOL/L (ref 3.5–5.1)
RBC # BLD AUTO: 2.79 M/UL (ref 4.2–5.4)
SODIUM SERPL-SCNC: 143 MMOL/L (ref 136–145)
WBC # BLD AUTO: 9.1 K/UL (ref 4.8–10.8)

## 2020-10-16 RX ADMIN — PANTOPRAZOLE SODIUM SCH MG: 40 INJECTION, POWDER, FOR SOLUTION INTRAVENOUS at 20:09

## 2020-10-16 RX ADMIN — INSULIN ASPART SCH UNITS: 100 INJECTION, SOLUTION INTRAVENOUS; SUBCUTANEOUS at 12:00

## 2020-10-16 RX ADMIN — INSULIN ASPART SCH UNITS: 100 INJECTION, SOLUTION INTRAVENOUS; SUBCUTANEOUS at 00:00

## 2020-10-16 RX ADMIN — INSULIN DETEMIR SCH UNITS: 100 INJECTION, SOLUTION SUBCUTANEOUS at 20:14

## 2020-10-16 RX ADMIN — INSULIN ASPART SCH UNITS: 100 INJECTION, SOLUTION INTRAVENOUS; SUBCUTANEOUS at 05:48

## 2020-10-16 RX ADMIN — INSULIN ASPART SCH UNITS: 100 INJECTION, SOLUTION INTRAVENOUS; SUBCUTANEOUS at 18:00

## 2020-10-16 RX ADMIN — CHLORHEXIDINE GLUCONATE SCH APPLIC: 213 SOLUTION TOPICAL at 20:09

## 2020-10-16 RX ADMIN — INSULIN DETEMIR SCH UNITS: 100 INJECTION, SOLUTION SUBCUTANEOUS at 09:11

## 2020-10-16 RX ADMIN — PANTOPRAZOLE SODIUM SCH MG: 40 INJECTION, POWDER, FOR SOLUTION INTRAVENOUS at 09:06

## 2020-10-16 NOTE — NUR
NURSE NOTES:

Resp Therapist tried to tapere Fio2 to 30% but pt unable to tolerate O2 sat desaturated to 
91%.pt paced back to 40%.

## 2020-10-16 NOTE — NUR
NURSE NOTES:

 Report given to ambulance  kathrin,unable to  pt since BP was 98/52,they ll come 
back after 2 hrs.

## 2020-10-16 NOTE — NUR
NURSE NOTES:

Called [facility],report given to Madalyn Ridley,updated re pt's status,,V/S 
,medications,diet and BM.Family member Annetta Lisa informed re pt's discharge back to 
[facility],by Charge RN Selena Osorio.

## 2020-10-16 NOTE — SURGERY PROGRESS NOTE
Surgery Progress Note


Subjective


Procedure Performed


removal left chest tube


Additional Comments


no acute events


no n/v


labs okay


exam stable





Objective





Last 24 Hour Vital Signs








  Date Time  Temp Pulse Resp B/P (MAP) Pulse Ox O2 Delivery O2 Flow Rate FiO2


 


10/16/20 12:00      Mechanical Ventilator  





      Mechanical Ventilator  





      Mechanical Ventilator  


 


10/16/20 12:00        40


 


10/16/20 11:50 98.8 50 20 113/62 (79) 93   


 


10/16/20 11:20  52 20     40


 


10/16/20 09:53        40


 


10/16/20 09:00        30


 


10/16/20 08:00 98.1 53 20 126/64 (84) 99   


 


10/16/20 08:00        30


 


10/16/20 08:00  49      


 


10/16/20 08:00      Mechanical Ventilator  





      Mechanical Ventilator  





      Mechanical Ventilator  


 


10/16/20 07:00  50 20     30


 


10/16/20 04:00 96.9 55 20 125/70 (88) 97   


 


10/16/20 04:00        40


 


10/16/20 04:00      Mechanical Ventilator  





      Mechanical Ventilator  





      Mechanical Ventilator  


 


10/16/20 03:49  44      


 


10/16/20 02:16  54 20     40


 


10/16/20 01:21  137      


 


10/16/20 00:00      Mechanical Ventilator  





      Mechanical Ventilator  





      Mechanical Ventilator  


 


10/16/20 00:00        40


 


10/16/20 00:00 97.7 48 22 114/68 (83) 97   


 


10/16/20 00:00  41      


 


10/15/20 22:32  50 20     40


 


10/15/20 20:00  72      


 


10/15/20 20:00      Mechanical Ventilator  





      Mechanical Ventilator  





      Mechanical Ventilator  


 


10/15/20 20:00 96.4 59 20 127/71 (89) 98   


 


10/15/20 20:00        40


 


10/15/20 19:02  51 20     40


 


10/15/20 16:00      Mechanical Ventilator  





      Mechanical Ventilator  





      Mechanical Ventilator  


 


10/15/20 16:00  72      


 


10/15/20 16:00        45


 


10/15/20 16:00 96.1 59 18 118/65 (82) 98   


 


10/15/20 15:20  52 21     40








I&O











Intake and Output  


 


 10/15/20 10/16/20





 19:00 07:00


 


Intake Total 1040 ml 600 ml


 


Output Total 900 ml 950 ml


 


Balance 140 ml -350 ml


 


  


 


Free Water 200 ml 100 ml


 


Tube Feeding 600 ml 500 ml


 


Other 240 ml 


 


Output Urine Total 900 ml 950 ml


 


# Bowel Movements  1








Dressing:  saturated


Cardiovascular:  RSR


Respiratory:  decreased breath sounds


Abdomen:  soft, non-tender, present bowel sounds


Extremities:  no tenderness, no cyanosis





Laboratory Tests








Test


 10/15/20


17:53 10/15/20


20:04 10/16/20


00:55 10/16/20


04:00


 


POC Whole Blood Glucose


 129 MG/DL


()  H 106 MG/DL


() 115 MG/DL


()  H 





 


White Blood Count


 


 


 


 9.1 K/UL


(4.8-10.8)


 


Red Blood Count


 


 


 


 2.79 M/UL


(4.20-5.40)  L


 


Hemoglobin


 


 


 


 8.7 G/DL


(12.0-16.0)  L


 


Hematocrit


 


 


 


 25.8 %


(37.0-47.0)  L


 


Mean Corpuscular Volume    92 FL (80-99)  


 


Mean Corpuscular Hemoglobin


 


 


 


 31.3 PG


(27.0-31.0)  H


 


Mean Corpuscular Hemoglobin


Concent 


 


 


 33.8 G/DL


(32.0-36.0)


 


Red Cell Distribution Width


 


 


 


 17.4 %


(11.6-14.8)  H


 


Platelet Count


 


 


 


 106 K/UL


(150-450)  L


 


Mean Platelet Volume


 


 


 


 8.4 FL


(6.5-10.1)


 


Neutrophils (%) (Auto)


 


 


 


 56.0 %


(45.0-75.0)


 


Lymphocytes (%) (Auto)


 


 


 


 34.2 %


(20.0-45.0)


 


Monocytes (%) (Auto)


 


 


 


 7.6 %


(1.0-10.0)


 


Eosinophils (%) (Auto)


 


 


 


 1.1 %


(0.0-3.0)


 


Basophils (%) (Auto)


 


 


 


 1.0 %


(0.0-2.0)


 


Sodium Level


 


 


 


 143 MMOL/L


(136-145)


 


Potassium Level


 


 


 


 3.8 MMOL/L


(3.5-5.1)


 


Chloride Level


 


 


 


 111 MMOL/L


()  H


 


Carbon Dioxide Level


 


 


 


 28 MMOL/L


(21-32)


 


Anion Gap


 


 


 


 4 mmol/L


(5-15)  L


 


Blood Urea Nitrogen


 


 


 


 16 mg/dL


(7-18)


 


Creatinine


 


 


 


 0.6 MG/DL


(0.55-1.30)


 


Estimat Glomerular Filtration


Rate 


 


 


 > 60 mL/min


(>60)


 


Glucose Level


 


 


 


 120 MG/DL


()  H


 


Calcium Level


 


 


 


 7.4 MG/DL


(8.5-10.1)  L


 


Total Bilirubin


 


 


 


 0.4 MG/DL


(0.2-1.0)


 


Aspartate Amino Transf


(AST/SGOT) 


 


 


 25 U/L (15-37)





 


Alanine Aminotransferase


(ALT/SGPT) 


 


 


 37 U/L (12-78)





 


Alkaline Phosphatase


 


 


 


 70 U/L


()


 


Pro-B-Type Natriuretic Peptide


 


 


 


 970 pg/mL


(0-125)  H


 


Total Protein


 


 


 


 4.7 G/DL


(6.4-8.2)  L


 


Albumin


 


 


 


 1.3 G/DL


(3.4-5.0)  L


 


Globulin    3.4 g/dL  


 


Albumin/Globulin Ratio


 


 


 


 0.4 (1.0-2.7)


L


 


Test


 10/16/20


05:44 10/16/20


11:48 


 





 


POC Whole Blood Glucose


 100 MG/DL


() 123 MG/DL


()  H 


 














Plan


Problems:  


(1) Respiratory distress


Assessment & Plan:  Respiratory insufficiency requiring prolonged ventilator 

support.  Patient on minimal vent settings right now currently in stable but 

unfortunately not safe for extubation.  Tracheostomy is indicated recommended.  

I discussed case with pulmonology team ICU team medical teams.  Patient unable 

to make decisions and her current condition and given her history.  The care 

team has been contacted and will be reviewed for evaluation and consideration of

the tracheostomy.  If consented I think it is reasonable for tracheostomy given 

patient's current CODE STATUS care plan and goals of care.  Extubation his 

current status is potentially high risk for reintubation emergency complication.

 We will plan for tracheostomy if consent is obtained.  Thank you for let me 

participate patient's care will follow with recommendations





will plan for trach when ready


wean fi02 





s/p trach 10?8





(2) Encephalopathy chronic


(3) Dysphagia


(4) Down's syndrome


(5) Sepsis


Assessment & Plan:  DAILY ESTIMATED NEEDS:


Needs based on CRITICAL CARE, 50kg  


22-28  kcals/kg 


7900-7550  total kcals


1.25-2  g protein/kg


  g total protein 


25-30  mL/kg


6410-5040  total fluid mLs





NUTRITION DIAGNOSIS:


Swallowing difficulty r/t dysphagia as evidenced by pt w/ Downs Syndrome,


PEG dep for all nutritional needs, now intubated, off pressor support


pending trach placement.


  





CURRENT TF:Vital AF 1.2 @ 50ml/hr x 22 hrs (On Synthroid QD)- held for procedure










ENTERAL NUTRITION RECOMMENDATIONS:


Glucerna 1.2 @ 50ml/hr x 22 hrs  to provide 1100ml, 1320 kcal, 66g prot, 886ml 

free wa ter 





* Rec TF change to Glucerna 1.2 for carb control formula. Start @30ml/hr,


advance as tolerated to goal.


* Hold 1 hr before and after Synthroid med


* HOB over 30 degrees/ water flush per MD








ADDITIONAL RECOMMENDATIONS:


1) Ht of 61 inches per SNF; recalibrate bed scale for accurate CBW  


2) Add NISS: BGs elevated on Solucortef-> NOW ON NISS + LEVEMIR 


3) Monitor hemodynamic stability: OFF PRESSOR SUPPORT 


4) Wound healing: add Vit C 250mg QD + continue Marcio BID 


5) Monitor lytes, replete as needed (lytes wnl)  


6) Probiotics for diarrhea -> none noted 


. 





(6) Acute respiratory failure


(7) Pneumonia


(8) Trisomy 21, Down syndrome


(9) DAVID (acute kidney injury)


(10) Bacteremia


(11) Hypokalemia


(12) Anemia


(13) Diarrhea


(14) Hypernatremia


(15) Pneumothorax


(16) Pneumothorax, right


Assessment & Plan:  right ptx.  s/p chest tube


tube removed 10/3





left ptx tube placed 10/2


left CT to water seal 


chest tube removed 10/12





(17) Cardiac arrest


(18) ARDS (adult respiratory distress syndrome)


(19) Septic shock


(20) HCAP (healthcare-associated pneumonia)


(21) Respiratory failure requiring intubation


(22) Seizure disorder


(23) Hypothyroidism











Jake Aranda                Oct 16, 2020 13:46

## 2020-10-16 NOTE — NUR
NURSE NOTES:

Received report from Macarena Arredondo, pt. in bed awake- with eyes open, no signs or symptoms of 
acute cardiac or respiratory distress noted, bed alarm on, side rails up x's 3 and safety 
brakes engaged, bed locked in position, pt. appears to be sating well on current vent 
settings well- AC 20, , Fio2 at 45% and peep 5- no distress noted, pt. feeding stopped 
prior shift as pt. is awaiting to be discharged- no residual noted, Valle intact and 
draining to gravity, side rails padded for seizure precautions- no seizure activity noted- 
upon assessment, pt. appears clean and dry, CARLOS PICC intact and patent- TKO, safety measures 
continued, Aspiration precautions observed, skin precautions observed, will continue with 
plan of care.



per endorsement to remove PICC line prior to discharge.  [facility] ambulance will  pt. 
around 2030.

## 2020-10-16 NOTE — NUR
discharge disposition: please read 



patient to be discharged to 



[facility] 

[address] [facility] 

room [address]

T: [phone]>> CALL FOR REPORT 



LIFELINE W/ RT ETA 1730



CM S/W ROGELIO AT [facility] TO MAKE HER AWARE OF DC PLAN 



TRANSFER REPORT TO BE PROVIDED

## 2020-10-16 NOTE — NUR
NURSE HAND-OFF REPORT: 



Important Events on Shift:stable

Patient Status: stable

Diet: Vital AF 1.2



Pending Orders: 

Pending Results/Labs:

Pending MD notification:



Latest Vital Signs: Temperature 96.9 , Pulse 55 , B/P 125 /70 , Respiratory Rate 20 , O2 SAT 
97 , Mechanical Ventilator, O2 Flow Rate 15.0 .  

Vital Sign Comment: 



EKG Rhythm: Sinus Bradycardia- ST - SR - SB

Rhythm change?: y

MD Notified?: N -pt. SB - 's- converted back to SR  in few seconds then to SB- pt. 
remains stable.

MD Response: 



Latest Momin Fall Score: 70  

Fall Risk: High Risk 

Safety Measures: Call light Within Reach, Bed Alarm Zone 1, Side Rails Side Rails x3, Bed 
position Low and Locked.

Fall Precautions: 

Yellow Socks



Report given to LAYTON Garcia pt. remains stable- aware to f/u on any abnormal am labs.

## 2020-10-16 NOTE — PULMONOLGY CRITICAL CARE NOTE
Critical Care - Asmt/Plan


Problems:  


(1) Acute respiratory failure


(2) Pneumothorax


(3) Bacteremia


(4) Pneumonia


(5) Sepsis


(6) HCAP (healthcare-associated pneumonia)


(7) Seizure disorder


(8) Down's syndrome


(9) Trisomy 21, Down syndrome


Respiratory:  monitor respiratory rate, adjust FIO2, CXR, other - saturating


Renal:  F/U I&O, other - continue lasix 20 qd, watch bun/creatinine, Check BNP  

and cxr in a few days


Infectious Disease:  check cultures


Gastrointestinal:  continue feedings/current rate


Endocrine:  monitor blood sugar


Hematologic:  transfuse if hgb<8.5


Neurologic:  PRN Ativan, PRN Morphine, keep patient comfortable


Prophylaxis:  Protonix


Time Spent (Minutes):  40


Notes Reviewed:  internist, cardio, renal, ID, GI


Discussed with:  nurses, consultants, 





Critical Care - Objective





Last 24 Hour Vital Signs








  Date Time  Temp Pulse Resp B/P (MAP) Pulse Ox O2 Delivery O2 Flow Rate FiO2


 


10/16/20 09:53        40


 


10/16/20 09:00        30


 


10/16/20 08:00 98.1 53 20 126/64 (84) 99   


 


10/16/20 08:00        30


 


10/16/20 08:00  49      


 


10/16/20 08:00      Mechanical Ventilator  





      Mechanical Ventilator  





      Mechanical Ventilator  


 


10/16/20 07:00  50 20     30


 


10/16/20 04:00 96.9 55 20 125/70 (88) 97   


 


10/16/20 04:00        40


 


10/16/20 04:00      Mechanical Ventilator  





      Mechanical Ventilator  





      Mechanical Ventilator  


 


10/16/20 03:49  44      


 


10/16/20 02:16  54 20     40


 


10/16/20 01:21  137      


 


10/16/20 00:00      Mechanical Ventilator  





      Mechanical Ventilator  





      Mechanical Ventilator  


 


10/16/20 00:00        40


 


10/16/20 00:00 97.7 48 22 114/68 (83) 97   


 


10/16/20 00:00  41      


 


10/15/20 22:32  50 20     40


 


10/15/20 20:00  72      


 


10/15/20 20:00      Mechanical Ventilator  





      Mechanical Ventilator  





      Mechanical Ventilator  


 


10/15/20 20:00 96.4 59 20 127/71 (89) 98   


 


10/15/20 20:00        40


 


10/15/20 19:02  51 20     40


 


10/15/20 16:00      Mechanical Ventilator  





      Mechanical Ventilator  





      Mechanical Ventilator  


 


10/15/20 16:00  72      


 


10/15/20 16:00        45


 


10/15/20 16:00 96.1 59 18 118/65 (82) 98   


 


10/15/20 15:20  52 21     40


 


10/15/20 12:01      Mechanical Ventilator  





      Mechanical Ventilator  





      Mechanical Ventilator  


 


10/15/20 12:00        45


 


10/15/20 12:00  51      


 


10/15/20 11:41 97.9 51 18 111/50 (70) 96   


 


10/15/20 11:29  49 20     45








Status:  awake


Condition:  improving


HEENT:  atraumatic, normocephalic


Neck:  trach


Lungs:  rales, rhonchi


Heart:  HR/BP stable, edema


Abdomen:  soft, non-tender


Extremities:  no C/C/E


Accucheck:  100





Critical Care - Subjective


ROS Limited/Unobtainable:  Yes


Condition:  improving


EKG Rhythm:  Sinus Rhythm


FI02:  40


Vent Support Breath Rate:  20


Vent Support Mode:  AC


Vent Tidal Volume:  500


Sputum Amount:  Small


PEEP:  0.0


PIP:  33


Tube Feeding Amount:  25


I&O:











Intake and Output  


 


 10/15/20 10/16/20





 19:00 07:00


 


Intake Total 1040 ml 600 ml


 


Output Total 900 ml 950 ml


 


Balance 140 ml -350 ml


 


  


 


Free Water 200 ml 100 ml


 


Tube Feeding 600 ml 500 ml


 


Other 240 ml 


 


Output Urine Total 900 ml 950 ml


 


# Bowel Movements  1








CXR:


10/15 unchanged infiltrate vs edema


ET-Tube:  7.5


ET Position:  23


Labs:





Laboratory Tests








Test


 10/15/20


11:40 10/15/20


17:53 10/15/20


20:04 10/16/20


00:55


 


POC Whole Blood Glucose


 Pending  


 129 MG/DL


()  H 106 MG/DL


() 115 MG/DL


()  H


 


Test


 10/16/20


04:00 10/16/20


05:44 


 





 


White Blood Count


 9.1 K/UL


(4.8-10.8) 


 


 





 


Red Blood Count


 2.79 M/UL


(4.20-5.40)  L 


 


 





 


Hemoglobin


 8.7 G/DL


(12.0-16.0)  L 


 


 





 


Hematocrit


 25.8 %


(37.0-47.0)  L 


 


 





 


Mean Corpuscular Volume 92 FL (80-99)     


 


Mean Corpuscular Hemoglobin


 31.3 PG


(27.0-31.0)  H 


 


 





 


Mean Corpuscular Hemoglobin


Concent 33.8 G/DL


(32.0-36.0) 


 


 





 


Red Cell Distribution Width


 17.4 %


(11.6-14.8)  H 


 


 





 


Platelet Count


 106 K/UL


(150-450)  L 


 


 





 


Mean Platelet Volume


 8.4 FL


(6.5-10.1) 


 


 





 


Neutrophils (%) (Auto)


 56.0 %


(45.0-75.0) 


 


 





 


Lymphocytes (%) (Auto)


 34.2 %


(20.0-45.0) 


 


 





 


Monocytes (%) (Auto)


 7.6 %


(1.0-10.0) 


 


 





 


Eosinophils (%) (Auto)


 1.1 %


(0.0-3.0) 


 


 





 


Basophils (%) (Auto)


 1.0 %


(0.0-2.0) 


 


 





 


Sodium Level


 143 MMOL/L


(136-145) 


 


 





 


Potassium Level


 3.8 MMOL/L


(3.5-5.1) 


 


 





 


Chloride Level


 111 MMOL/L


()  H 


 


 





 


Carbon Dioxide Level


 28 MMOL/L


(21-32) 


 


 





 


Anion Gap


 4 mmol/L


(5-15)  L 


 


 





 


Blood Urea Nitrogen


 16 mg/dL


(7-18) 


 


 





 


Creatinine


 0.6 MG/DL


(0.55-1.30) 


 


 





 


Estimat Glomerular Filtration


Rate > 60 mL/min


(>60) 


 


 





 


Glucose Level


 120 MG/DL


()  H 


 


 





 


Calcium Level


 7.4 MG/DL


(8.5-10.1)  L 


 


 





 


Total Bilirubin


 0.4 MG/DL


(0.2-1.0) 


 


 





 


Aspartate Amino Transf


(AST/SGOT) 25 U/L (15-37)


 


 


 





 


Alanine Aminotransferase


(ALT/SGPT) 37 U/L (12-78)


 


 


 





 


Alkaline Phosphatase


 70 U/L


() 


 


 





 


Pro-B-Type Natriuretic Peptide


 970 pg/mL


(0-125)  H 


 


 





 


Total Protein


 4.7 G/DL


(6.4-8.2)  L 


 


 





 


Albumin


 1.3 G/DL


(3.4-5.0)  L 


 


 





 


Globulin 3.4 g/dL     


 


Albumin/Globulin Ratio


 0.4 (1.0-2.7)


L 


 


 





 


POC Whole Blood Glucose


 


 100 MG/DL


() 


 




















Chano Cherry MD              Oct 16, 2020 10:37

## 2020-10-16 NOTE — NUR
NURSE NOTES:

bed bath given ,Valle cath removed,feeding discontinued , time for ambulance  is at 
1700.

## 2020-10-16 NOTE — NUR
NURSE NOTES:

report given to Benson with [facility] ambulance- pt. remains stable upon d/c- last BS 82- aware 
to have Western start feeding- to prevent Hypoglycemia.

## 2020-10-16 NOTE — CARDIOLOGY PROGRESS NOTE
Assessment/Plan


Assessment/Plan


sepsis


respiratory failure 


ards 


renal insuf 


bacteremia


abn cardiac enzyme due to demand 


sinus arnold stable 


thrombocytopenia resolved  


hypernatremia 


pneumothorax now s/p chest tube on the left now 


thrombocytopenia 








vent support 


tele personally reviewed sinus arnold


bp ok 


watch plt  


 








Subjective


ROS Limited/Unobtainable:  Yes


Subjective


on  a vent not communicative AWAKE





Objective





Last 24 Hour Vital Signs








  Date Time  Temp Pulse Resp B/P (MAP) Pulse Ox O2 Delivery O2 Flow Rate FiO2


 


10/16/20 16:00      Mechanical Ventilator  





      Mechanical Ventilator  





      Mechanical Ventilator  


 


10/16/20 16:00        40


 


10/16/20 16:00  53      


 


10/16/20 16:00 98.6 59 20 98/48 (65) 96   


 


10/16/20 15:20  60 20     40


 


10/16/20 14:00        30


 


10/16/20 12:00      Mechanical Ventilator  





      Mechanical Ventilator  





      Mechanical Ventilator  


 


10/16/20 12:00        40


 


10/16/20 12:00  51      


 


10/16/20 11:50 98.8 50 20 113/62 (79) 93   


 


10/16/20 11:20  52 20     40


 


10/16/20 09:53        40


 


10/16/20 09:00        30


 


10/16/20 08:00 98.1 53 20 126/64 (84) 99   


 


10/16/20 08:00        30


 


10/16/20 08:00  49      


 


10/16/20 08:00      Mechanical Ventilator  





      Mechanical Ventilator  





      Mechanical Ventilator  


 


10/16/20 07:00  50 20     30


 


10/16/20 04:00 96.9 55 20 125/70 (88) 97   


 


10/16/20 04:00        40


 


10/16/20 04:00      Mechanical Ventilator  





      Mechanical Ventilator  





      Mechanical Ventilator  


 


10/16/20 03:49  44      


 


10/16/20 02:16  54 20     40


 


10/16/20 01:21  137      


 


10/16/20 00:00      Mechanical Ventilator  





      Mechanical Ventilator  





      Mechanical Ventilator  


 


10/16/20 00:00        40


 


10/16/20 00:00 97.7 48 22 114/68 (83) 97   


 


10/16/20 00:00  41      


 


10/15/20 22:32  50 20     40


 


10/15/20 20:00  72      


 


10/15/20 20:00      Mechanical Ventilator  





      Mechanical Ventilator  





      Mechanical Ventilator  


 


10/15/20 20:00 96.4 59 20 127/71 (89) 98   


 


10/15/20 20:00        40


 


10/15/20 19:02  51 20     40








General Appearance:  no apparent distress, alert, on vent, patient on isolation











Intake and Output  


 


 10/15/20 10/16/20





 19:00 07:00


 


Intake Total 1040 ml 600 ml


 


Output Total 900 ml 950 ml


 


Balance 140 ml -350 ml


 


  


 


Free Water 200 ml 100 ml


 


Tube Feeding 600 ml 500 ml


 


Other 240 ml 


 


Output Urine Total 900 ml 950 ml


 


# Bowel Movements  1











Laboratory Tests








Test


 10/15/20


20:04 10/16/20


00:55 10/16/20


04:00 10/16/20


05:44


 


POC Whole Blood Glucose


 106 MG/DL


() 115 MG/DL


()  H 


 100 MG/DL


()


 


White Blood Count


 


 


 9.1 K/UL


(4.8-10.8) 





 


Red Blood Count


 


 


 2.79 M/UL


(4.20-5.40)  L 





 


Hemoglobin


 


 


 8.7 G/DL


(12.0-16.0)  L 





 


Hematocrit


 


 


 25.8 %


(37.0-47.0)  L 





 


Mean Corpuscular Volume   92 FL (80-99)   


 


Mean Corpuscular Hemoglobin


 


 


 31.3 PG


(27.0-31.0)  H 





 


Mean Corpuscular Hemoglobin


Concent 


 


 33.8 G/DL


(32.0-36.0) 





 


Red Cell Distribution Width


 


 


 17.4 %


(11.6-14.8)  H 





 


Platelet Count


 


 


 106 K/UL


(150-450)  L 





 


Mean Platelet Volume


 


 


 8.4 FL


(6.5-10.1) 





 


Neutrophils (%) (Auto)


 


 


 56.0 %


(45.0-75.0) 





 


Lymphocytes (%) (Auto)


 


 


 34.2 %


(20.0-45.0) 





 


Monocytes (%) (Auto)


 


 


 7.6 %


(1.0-10.0) 





 


Eosinophils (%) (Auto)


 


 


 1.1 %


(0.0-3.0) 





 


Basophils (%) (Auto)


 


 


 1.0 %


(0.0-2.0) 





 


Sodium Level


 


 


 143 MMOL/L


(136-145) 





 


Potassium Level


 


 


 3.8 MMOL/L


(3.5-5.1) 





 


Chloride Level


 


 


 111 MMOL/L


()  H 





 


Carbon Dioxide Level


 


 


 28 MMOL/L


(21-32) 





 


Anion Gap


 


 


 4 mmol/L


(5-15)  L 





 


Blood Urea Nitrogen


 


 


 16 mg/dL


(7-18) 





 


Creatinine


 


 


 0.6 MG/DL


(0.55-1.30) 





 


Estimat Glomerular Filtration


Rate 


 


 > 60 mL/min


(>60) 





 


Glucose Level


 


 


 120 MG/DL


()  H 





 


Calcium Level


 


 


 7.4 MG/DL


(8.5-10.1)  L 





 


Total Bilirubin


 


 


 0.4 MG/DL


(0.2-1.0) 





 


Aspartate Amino Transf


(AST/SGOT) 


 


 25 U/L (15-37)


 





 


Alanine Aminotransferase


(ALT/SGPT) 


 


 37 U/L (12-78)


 





 


Alkaline Phosphatase


 


 


 70 U/L


() 





 


Pro-B-Type Natriuretic Peptide


 


 


 970 pg/mL


(0-125)  H 





 


Total Protein


 


 


 4.7 G/DL


(6.4-8.2)  L 





 


Albumin


 


 


 1.3 G/DL


(3.4-5.0)  L 





 


Globulin   3.4 g/dL   


 


Albumin/Globulin Ratio


 


 


 0.4 (1.0-2.7)


L 





 


Test


 10/16/20


11:48 10/16/20


18:42 


 





 


POC Whole Blood Glucose


 123 MG/DL


()  H 108 MG/DL


()  H 


 




















Constantine Jorgensen MD          Oct 16, 2020 18:50

## 2020-10-16 NOTE — INTERNAL MED PROGRESS NOTE
Subjective


Physician Name


Tyson Hung


Attending Physician


Chano Cherry MD





Current Medications








 Medications


  (Trade)  Dose


 Ordered  Sig/Didi


 Route


 PRN Reason  Start Time


 Stop Time Status Last Admin


Dose Admin


 


 Acetaminophen


  (Tylenol)  650 mg  Q4H  PRN


 GT


 For Pain  9/23/20 17:15


 10/23/20 17:14  10/2/20 17:46





 


 Chlorhexidine


 Gluconate


  (Beatrice-Hex 2%)  1 applic  DAILY@2000


 TOPIC


   8/31/20 20:00


 11/29/20 19:59  10/15/20 20:08





 


 Clotrimazole


  (Lotrimin)  1 applic  Q12HR


 TOPIC


   8/30/20 13:00


 11/28/20 12:59  10/15/20 20:08





 


 Dextrose


  (Dextrose 50%)  25 ml  Q30M  PRN


 IV


 Hypoglycemia  8/26/20 11:30


 11/20/20 11:29   





 


 Dextrose


  (Dextrose 50%)  50 ml  Q30M  PRN


 IV


 Hypoglycemia  8/26/20 11:30


 11/20/20 11:29   





 


 Hydrocortisone


  (Solu-CORTEF)  25 mg  DAILY


 IV


   10/16/20 09:00


 1/7/21 08:59   





 


 Insulin Aspart


  (NovoLOG)    Q6HR


 SUBQ


   9/18/20 12:00


 12/17/20 11:59  10/14/20 17:31





 


 Insulin Detemir


  (Levemir)  10 units  Q12HR


 SUBQ


   9/18/20 10:00


 12/17/20 09:59  10/15/20 20:08





 


 Levothyroxine


 Sodium


  (Synthroid)  75 mcg  DAILY@0630


 GT


   9/30/20 06:30


 10/30/20 06:29  10/16/20 05:40





 


 Loperamide HCl


  (Imodium)  2 mg  Q6H  PRN


 NG


 Diarrhea  9/16/20 10:30


 10/16/20 10:29  9/23/20 11:41





 


 Pantoprazole


  (Protonix)  40 mg  EVERY 12  HOURS


 IVP


   9/28/20 21:00


 10/28/20 20:59  10/15/20 20:09





 


 Potassium Chloride


  (K-Dur)  40 meq  EVERY 12  HOURS


 GT


   9/26/20 21:00


 12/19/20 12:14  10/15/20 20:09











Allergies:  


Coded Allergies:  


     No Known Allergies (Unverified , 1/8/19)


Subjective


in PCU, ,on the vent, tracheostomy , awake, responsive, open her eyes, WBC 9.1, 

Hemoglobin 8.7





Objective





Last Vital Signs








  Date Time  Temp Pulse Resp B/P (MAP) Pulse Ox O2 Delivery O2 Flow Rate FiO2


 


10/16/20 08:00 98.1 53 20 126/64 (84) 99   


 


10/16/20 08:00        30


 


10/16/20 08:00      Mechanical Ventilator  





      Mechanical Ventilator  





      Mechanical Ventilator  











Laboratory Tests








Test


 10/15/20


11:40 10/15/20


17:53 10/15/20


20:04 10/16/20


00:55


 


POC Whole Blood Glucose


 Pending  


 129 MG/DL


()  H 106 MG/DL


() 115 MG/DL


()  H


 


Test


 10/16/20


04:00 10/16/20


05:44 


 





 


White Blood Count


 9.1 K/UL


(4.8-10.8) 


 


 





 


Red Blood Count


 2.79 M/UL


(4.20-5.40)  L 


 


 





 


Hemoglobin


 8.7 G/DL


(12.0-16.0)  L 


 


 





 


Hematocrit


 25.8 %


(37.0-47.0)  L 


 


 





 


Mean Corpuscular Volume 92 FL (80-99)     


 


Mean Corpuscular Hemoglobin


 31.3 PG


(27.0-31.0)  H 


 


 





 


Mean Corpuscular Hemoglobin


Concent 33.8 G/DL


(32.0-36.0) 


 


 





 


Red Cell Distribution Width


 17.4 %


(11.6-14.8)  H 


 


 





 


Platelet Count


 106 K/UL


(150-450)  L 


 


 





 


Mean Platelet Volume


 8.4 FL


(6.5-10.1) 


 


 





 


Neutrophils (%) (Auto)


 56.0 %


(45.0-75.0) 


 


 





 


Lymphocytes (%) (Auto)


 34.2 %


(20.0-45.0) 


 


 





 


Monocytes (%) (Auto)


 7.6 %


(1.0-10.0) 


 


 





 


Eosinophils (%) (Auto)


 1.1 %


(0.0-3.0) 


 


 





 


Basophils (%) (Auto)


 1.0 %


(0.0-2.0) 


 


 





 


Sodium Level


 143 MMOL/L


(136-145) 


 


 





 


Potassium Level


 3.8 MMOL/L


(3.5-5.1) 


 


 





 


Chloride Level


 111 MMOL/L


()  H 


 


 





 


Carbon Dioxide Level


 28 MMOL/L


(21-32) 


 


 





 


Anion Gap


 4 mmol/L


(5-15)  L 


 


 





 


Blood Urea Nitrogen


 16 mg/dL


(7-18) 


 


 





 


Creatinine


 0.6 MG/DL


(0.55-1.30) 


 


 





 


Estimat Glomerular Filtration


Rate > 60 mL/min


(>60) 


 


 





 


Glucose Level


 120 MG/DL


()  H 


 


 





 


Calcium Level


 7.4 MG/DL


(8.5-10.1)  L 


 


 





 


Total Bilirubin


 0.4 MG/DL


(0.2-1.0) 


 


 





 


Aspartate Amino Transf


(AST/SGOT) 25 U/L (15-37)


 


 


 





 


Alanine Aminotransferase


(ALT/SGPT) 37 U/L (12-78)


 


 


 





 


Alkaline Phosphatase


 70 U/L


() 


 


 





 


Pro-B-Type Natriuretic Peptide


 970 pg/mL


(0-125)  H 


 


 





 


Total Protein


 4.7 G/DL


(6.4-8.2)  L 


 


 





 


Albumin


 1.3 G/DL


(3.4-5.0)  L 


 


 





 


Globulin 3.4 g/dL     


 


Albumin/Globulin Ratio


 0.4 (1.0-2.7)


L 


 


 





 


POC Whole Blood Glucose


 


 100 MG/DL


() 


 




















Intake and Output  


 


 10/15/20 10/16/20





 19:00 07:00


 


Intake Total 1040 ml 600 ml


 


Output Total 900 ml 950 ml


 


Balance 140 ml -350 ml


 


  


 


Free Water 200 ml 100 ml


 


Tube Feeding 600 ml 500 ml


 


Other 240 ml 


 


Output Urine Total 900 ml 950 ml


 


# Bowel Movements  1








Objective


General: awake, responsive, open eyes, tracking.


HEENT: NCAT, sclera anicteric, PERRL, EOMI


Neck: Supple, tracheostomy site intact


Lungs: mechanical breath sounds decreased air at the bases,  no Wheeze, no 

rhonchi


Heart: Regular rate and rhythm, normal S1/S2, no murmurs/gallops


Abdomen: soft, not tender, not distended. + bowel sounds, Morbid Obesity, PEG 

site intact.


: Valle cath


Extremities: No Cyanosis , clubbing,  Bilateral upper extremities less edema. 

left upper extremity PICC line.


Neuro: limited secondary to patient status, unable to follow command, bilateral 

upper and lower extremities contracted.





Assessment/Plan


Assessment/Plan


Problem List:  


(1) HCAP (healthcare-associated pneumonia)


(2) Sepsis


(3) Down's syndrome


(4) Dysphagia S/P PEG


(5) Seizure disorder


(6) Hypothyroidism


(7) Acute Hypoxemic respiratory failure


(8) Persistent, high grade bacteremia-  r/o endocarditis


(9) DAVID


(10) Bilateral  pneumothorax status post chest tube placement and removal.





Plan: 


Tolerated tube feeding @ 50 cc/hr


Follow-up with laboratory and cultures


On Hydrocortisone 25 mg IV daily


Continue to monitor off abx 


DC Planning to subacute unit











Tyson Hung MD                 Oct 16, 2020 09:09

## 2020-10-16 NOTE — NUR
NURSE HAND-OFF REPORT: 



Important Events on Shift:pt for discharge to [facility]

Patient Status: stable

Diet: Vital AF1.2 at 50 ml/hr GT



Pending Orders: N/A

Pending Results/Labs:N/A

Pending MD notification:N/A



Latest Vital Signs: Temperature 98.6 , Pulse 50 , B/P 98 /48 , Respiratory Rate 20 , O2 SAT 
96 , Mechanical Ventilator, O2 Flow Rate 15.0 .  

Vital Sign Comment: stable



EKG Rhythm: Sinus Bradycardia

Rhythm change?: N 

MD Notified?: N -Dr Mehul MENDENHALL Response: No New Orders Received



Latest Momin Fall Score: 70  

Fall Risk: High Risk 

Safety Measures: Call light Within Reach, Bed Alarm Zone 1, Side Rails Side Rails x3, Bed 
position Low and Locked.

Fall Precautions: 

Yellow Socks



Report given to Adwoa TRAYLOR.

## 2020-10-16 NOTE — INFECTIOUS DISEASES PROG NOTE
Assessment/Plan





ASSESSMENT:


sp code blue 8/26


Septic Shock; SP


Fever, recurrent- low grade -SP


Leukocytosis; recurrent; SP


     10/5 u/a 10-25, nit neg, leuk +2;  ucx PsA (I Genta); colonizer


            sp cx P, mirabilsi (R levo); colonzier


     -9/19 Bcx Neg


   -9/9 u/a no pyuria


   -9/8 Bcx Neg(Picc line)


   -9/6 Bcx Neg


            ucx Neg


             sp cx C. parapsilopsis


  -8/26 u/a no pyuria





Pneumonia.- COVID 19 neg x3


   Acute hypoxic resp failure on VM> NRB 15l 100%; hypoxic on ABG> now VDRF 

8/26- Fio2 80% >100% 8/28> 60% 9/1 >80% 9/2   >95% 9/10 >90% 9/14 >60% 9/15


R tension Pneumothroax sp CT 9/21 


    10/9 sp trach


      10/9 CXR:   Diffuse airspace opacities.


      10/8 CXR: bilateral infiltrates are all unchanged.


      10/5 CXR: Extensive bilateral airspace opacities, right greater than left,

 consistent with multifocal infiltrate worse pulmonary edema.  This is  similar 

in appearance to the prior study. Worsening, small left pleural effusion.  This 

may be secondary to  redistribution as the right-sided pleural effusion has 

mildly improved. 


       -9/22 CXR: Interim complete reexpansion of right lung without evidence of

residual pneumothorax. Bilateral diffuse and extensive infiltrates. Markedly 

improved chest wall subcutaneous emphysema


       -9/21 CXR: Interim reexpansion of previously demonstrated right 

pneumothorax, status post large bore chest tube placement.


      -9/14 CXR: Improved aeration of both lungs.


       -9/5 CXR:Small bilateral pleural effusions with minor edema.  Stable 

edema with  mild worsening in the degree of pleural effusion on the right.


         -9/1 sp cx Neg


         8/31 CXR: Extensive bilateral interstitial and airspace disease appears

similar to the prior exam. Moderate to large bilateral pleural effusions appear 

unchanged.


   8/28 CXR: Increasing left upper lobe dense consolidation and likely 

increasing bilateral pleural fluid. Persistent diffuse dense consolidation 

elsewhere


            -8/26 sp cx normal resp lilliam


             -8/25 CXR: Increased atelectasis of the right lung, since prior 

exam of 3 days earlier. New or increased right pleural effusion. Increased left 

basilar consolidation and/or pleural fluid 


          -COVID Rapid PCR neg 8/23, 8/23, 8/26


          -8/22 spc x Group G strep


          -8/22 CXR:   Reduced lung volumes.  Patchy bilateral predominantly 

interstitial  pulmonary opacities.  Could be from edema and/or pneumonia.  There

is a broader differential.


 


        -legionella ag urine, blasto ab, Histo ab, HIV ab screen, JOY, ANCA neg





Persistent, high grade bacteremia-


     -8/22 Bcx 4/4 sets S. haemolyticus; 8/23 Bcx 3/4 S/ epi; 8/27 Bcx 1.4 S. 

warnerri; 8/29 Bcx Neg


     -2d echo: no vegetaions seen





          ua/ wbc 10-15, nit neg, leuk +1; ucx Neg





DAVID; 


 -supratherapeutic vanco levels





-Seizure disorder.


- Hypothyroidism.


- Down syndrome.





History of PEG tube placement.


[facility]








PLAN:


Monitor off abx unless febrile, increasing WBC and/or HD unstable





          9/14 SP Meropenem #18, IV Amikacin #7


          9/4/20 SP Daptomycin #11


          9/2 SP MIcafungin #7, Linezolid #5


   8/28 SP Azithromycin #7/7


   8/26 SP Ceftriaxone #2


   8/25 SP IV Vancomycin #4, Zosyn #4


   8/22 SP Cefepime x1, Flagyl x1





- Monitor CBC, BMP.


.f/u cx


- COVID neg x3


- Monitor chest x-ray.


-PEG/Trach care


-aspiration precautions


-discharge planning to SNF-needs to be FIO2 30% or below





Thank you, Dr. Cherry, for allowing me to participate in the care of


this patient.  I will follow the patient with you at this


hospitalization.








Discussed with RN





Subjective


Allergies:  


Coded Allergies:  


     No Known Allergies (Unverified , 1/8/19)


afebrile


no leukocytosis


discharge planning





Objective





Last 24 Hour Vital Signs








  Date Time  Temp Pulse Resp B/P (MAP) Pulse Ox O2 Delivery O2 Flow Rate FiO2


 


10/16/20 12:00      Mechanical Ventilator  





      Mechanical Ventilator  





      Mechanical Ventilator  


 


10/16/20 12:00        40


 


10/16/20 11:50 98.8 50 20 113/62 (79) 93   


 


10/16/20 11:20  52 20     40


 


10/16/20 09:53        40


 


10/16/20 09:00        30


 


10/16/20 08:00 98.1 53 20 126/64 (84) 99   


 


10/16/20 08:00        30


 


10/16/20 08:00  49      


 


10/16/20 08:00      Mechanical Ventilator  





      Mechanical Ventilator  





      Mechanical Ventilator  


 


10/16/20 07:00  50 20     30


 


10/16/20 04:00 96.9 55 20 125/70 (88) 97   


 


10/16/20 04:00        40


 


10/16/20 04:00      Mechanical Ventilator  





      Mechanical Ventilator  





      Mechanical Ventilator  


 


10/16/20 03:49  44      


 


10/16/20 02:16  54 20     40


 


10/16/20 01:21  137      


 


10/16/20 00:00      Mechanical Ventilator  





      Mechanical Ventilator  





      Mechanical Ventilator  


 


10/16/20 00:00        40


 


10/16/20 00:00 97.7 48 22 114/68 (83) 97   


 


10/16/20 00:00  41      


 


10/15/20 22:32  50 20     40


 


10/15/20 20:00  72      


 


10/15/20 20:00      Mechanical Ventilator  





      Mechanical Ventilator  





      Mechanical Ventilator  


 


10/15/20 20:00 96.4 59 20 127/71 (89) 98   


 


10/15/20 20:00        40


 


10/15/20 19:02  51 20     40


 


10/15/20 16:00      Mechanical Ventilator  





      Mechanical Ventilator  





      Mechanical Ventilator  


 


10/15/20 16:00  72      


 


10/15/20 16:00        45


 


10/15/20 16:00 96.1 59 18 118/65 (82) 98   


 


10/15/20 15:20  52 21     40








Height (Feet):  5


Height (Inches):  3.00


Weight (Pounds):  173


HEENT:  . trach in place


CHEST:  Coarse breathing sounds.


HEART:  S1 and S2.


ABDOMEN:  Soft.  PEG tube in place.


SKIN: no rash





Laboratory Tests








Test


 10/15/20


17:53 10/15/20


20:04 10/16/20


00:55 10/16/20


04:00


 


POC Whole Blood Glucose


 129 MG/DL


()  H 106 MG/DL


() 115 MG/DL


()  H 





 


White Blood Count


 


 


 


 9.1 K/UL


(4.8-10.8)


 


Red Blood Count


 


 


 


 2.79 M/UL


(4.20-5.40)  L


 


Hemoglobin


 


 


 


 8.7 G/DL


(12.0-16.0)  L


 


Hematocrit


 


 


 


 25.8 %


(37.0-47.0)  L


 


Mean Corpuscular Volume    92 FL (80-99)  


 


Mean Corpuscular Hemoglobin


 


 


 


 31.3 PG


(27.0-31.0)  H


 


Mean Corpuscular Hemoglobin


Concent 


 


 


 33.8 G/DL


(32.0-36.0)


 


Red Cell Distribution Width


 


 


 


 17.4 %


(11.6-14.8)  H


 


Platelet Count


 


 


 


 106 K/UL


(150-450)  L


 


Mean Platelet Volume


 


 


 


 8.4 FL


(6.5-10.1)


 


Neutrophils (%) (Auto)


 


 


 


 56.0 %


(45.0-75.0)


 


Lymphocytes (%) (Auto)


 


 


 


 34.2 %


(20.0-45.0)


 


Monocytes (%) (Auto)


 


 


 


 7.6 %


(1.0-10.0)


 


Eosinophils (%) (Auto)


 


 


 


 1.1 %


(0.0-3.0)


 


Basophils (%) (Auto)


 


 


 


 1.0 %


(0.0-2.0)


 


Sodium Level


 


 


 


 143 MMOL/L


(136-145)


 


Potassium Level


 


 


 


 3.8 MMOL/L


(3.5-5.1)


 


Chloride Level


 


 


 


 111 MMOL/L


()  H


 


Carbon Dioxide Level


 


 


 


 28 MMOL/L


(21-32)


 


Anion Gap


 


 


 


 4 mmol/L


(5-15)  L


 


Blood Urea Nitrogen


 


 


 


 16 mg/dL


(7-18)


 


Creatinine


 


 


 


 0.6 MG/DL


(0.55-1.30)


 


Estimat Glomerular Filtration


Rate 


 


 


 > 60 mL/min


(>60)


 


Glucose Level


 


 


 


 120 MG/DL


()  H


 


Calcium Level


 


 


 


 7.4 MG/DL


(8.5-10.1)  L


 


Total Bilirubin


 


 


 


 0.4 MG/DL


(0.2-1.0)


 


Aspartate Amino Transf


(AST/SGOT) 


 


 


 25 U/L (15-37)





 


Alanine Aminotransferase


(ALT/SGPT) 


 


 


 37 U/L (12-78)





 


Alkaline Phosphatase


 


 


 


 70 U/L


()


 


Pro-B-Type Natriuretic Peptide


 


 


 


 970 pg/mL


(0-125)  H


 


Total Protein


 


 


 


 4.7 G/DL


(6.4-8.2)  L


 


Albumin


 


 


 


 1.3 G/DL


(3.4-5.0)  L


 


Globulin    3.4 g/dL  


 


Albumin/Globulin Ratio


 


 


 


 0.4 (1.0-2.7)


L


 


Test


 10/16/20


05:44 10/16/20


11:48 


 





 


POC Whole Blood Glucose


 100 MG/DL


() 123 MG/DL


()  H 


 














Current Medications








 Medications


  (Trade)  Dose


 Ordered  Sig/Didi


 Route


 PRN Reason  Start Time


 Stop Time Status Last Admin


Dose Admin


 


 Acetaminophen


  (Tylenol)  650 mg  Q4H  PRN


 GT


 For Pain  9/23/20 17:15


 10/23/20 17:14  10/2/20 17:46





 


 Chlorhexidine


 Gluconate


  (Beatrice-Hex 2%)  1 applic  DAILY@2000


 TOPIC


   8/31/20 20:00


 11/29/20 19:59  10/15/20 20:08





 


 Clotrimazole


  (Lotrimin)  1 applic  Q12HR


 TOPIC


   8/30/20 13:00


 11/28/20 12:59  10/16/20 09:07





 


 Dextrose


  (Dextrose 50%)  25 ml  Q30M  PRN


 IV


 Hypoglycemia  8/26/20 11:30


 11/20/20 11:29   





 


 Dextrose


  (Dextrose 50%)  50 ml  Q30M  PRN


 IV


 Hypoglycemia  8/26/20 11:30


 11/20/20 11:29   





 


 Furosemide


  (Lasix)  20 mg  DAILY


 IV


   10/16/20 11:00


 11/15/20 10:59  10/16/20 11:10





 


 Hydrocortisone


  (Solu-CORTEF)  25 mg  DAILY


 IV


   10/16/20 09:00


 1/7/21 08:59  10/16/20 09:06





 


 Insulin Aspart


  (NovoLOG)    Q6HR


 SUBQ


   9/18/20 12:00


 12/17/20 11:59  10/14/20 17:31





 


 Insulin Detemir


  (Levemir)  10 units  Q12HR


 SUBQ


   9/18/20 10:00


 12/17/20 09:59  10/16/20 09:11





 


 Levothyroxine


 Sodium


  (Synthroid)  75 mcg  DAILY@0630


 GT


   9/30/20 06:30


 10/30/20 06:29  10/16/20 05:40





 


 Pantoprazole


  (Protonix)  40 mg  EVERY 12  HOURS


 IVP


   9/28/20 21:00


 10/28/20 20:59  10/16/20 09:06





 


 Potassium Chloride


  (K-Dur)  40 meq  EVERY 12  HOURS


 GT


   9/26/20 21:00


 12/19/20 12:14  10/16/20 09:07




















Rema Gomes M.D.            Oct 16, 2020 12:47

## 2020-10-16 NOTE — NUR
NURSE NOTES:

Report received from Adwoa and Bhumi Hernandez RN.Pt asleep noted no resp distress,with 
trach tube to vent,AC20 ,Fio2 40% Peep5 no signs of pain or disvomfort SR on the 
monitor,GTF Vital AF 1.2 at 50 ml/hr no residual noted,skin warm and dry CARLOS PICC line 
intact,and clean,SR up x2 HOB elevated bed lock in lowest position will continue with plans 
of care.

## 2020-10-16 NOTE — NUR
CASE MANAGEMENT: REVIEW



10/16/20



SI: RESP FAILURE....NEW TRACH

DOWN'S SYNDROME . PNEUMOTHORAX...S/P CHEST TUBE

VS: T 96.9 HR 55 RR 20 B/P 125/70 SATS 97% ON MECH VENT FIO2 30

LABS:   CA 7.4 



IS: IV SOLU CORTEF Q12

IV PROTONIX Q12H

K-DUR GT Q12H



**: SDU



DCP: PATIENT IS FROM [facility]

## 2020-10-16 NOTE — NEPHROLOGY PROGRESS NOTE
Assessment/Plan


Problem List:  


(1) DAVID (acute kidney injury)


(2) Respiratory failure requiring intubation


(3) Down's syndrome


(4) Seizure disorder


(5) Hypothyroidism


Assessment





Acute renal failure, likely due to hypotension


Acute respiratory distress, hypoxia


Seizure disorder


Hypothyroidism


Down syndrome


Full code











Fluid challenge with IV fluids and albumin


Midodrine for BP above 100 systolic


Check TSH level


Check


Correct level


Monitor renal parameters


Urine studies


Per orders


Plan


October 16: Labs reviewed.  Renal parameters are stable.  Continue per co

nsultants.


October 15: Labs reviewed.  Renal parameters stable.  Hemoglobin higher.  

Continue her consultants.


October 14: Labs reviewed.  Electrolyte abnormalities addressed.  Hemoglobin 

lower.  I suggest transfusion which I am deferring to PMD.  Continue to monitor 

renal parameters.


October 13: Labs reviewed.  Magnesium supplement given.  Hemoglobin 7.4.  Defer 

transfusion to PMD.  Continue to monitor electrolytes and renal parameters


October 12: Due discontinuation of chest tube today.  Trach to vent.  Renal 

parameters stable.


October 11: Trach to vent.  Left chest tube to drain.  Full code.  Labs 

reviewed.  Renal parameters stable.


October 10: Trached and vent.  Still has left chest tube to drain.  No chemistry

panel today.  Continue per consultants.  Patient full code.


October 9: Patient now has trach connected to vent.  Left chest tube remains in 

place.  Patient full code.  Continue per consultants.  Labs reviewed.


October 8: Discussed with RN.  Remains on ventilator.  Labs reviewed.  Stable 

from renal standpoint of view.  Patient due for tracheostomy when clinically 

stable.


October 7: On ventilator.  Left chest tube remains.  Full code.  Labs reviewed. 

Electrolyte abnormalities addressed.  Continue per consultants.


October 6: On ventilator.  Tracheostomy postponed because patient is clinically 

unstable.  Still have left chest tube draining.  Labs reviewed.  Electrolyte 

abnormalities addressed.  Continue per consultants.


October 5: Remains intubated on ventilator.  Discussed with RN.  Unclear if the 

patient is due for tracheostomy today or not.  Apparently the patient was 

reintubated again yesterday.  Patient has left chest tube.  Electrolyte 

abnormalities addressed.


October 4: Remains intubated on ventilator.  Due for tracheostomy tomorrow.  

Left chest tube present.  Patient is full code.  Labs reviewed.  Continue as is.


October 3: Remains intubated on ventilator.  Due for tracheostomy October 5.  

Has left-sided chest tube.  Stable from renal standpoint to view.  Continue per 

consultants.


October 2: Remains intubated on ventilator.  Electrolyte abnormalities 

addressed.  Supplements ordered.


October 1: Intubated on ventilator.  Labs reviewed.  Potassium supplement given.

 Remains bradycardic.  Continue per consultants.


September 30: Labs reviewed.  Electrolyte abnormalities addressed.  Heart rate 

remains bradycardic.  Continue per cardiology.  Continue to monitor renal 

parameters and electrolytes.


September 29: Labs reviewed.  Electrolyte abnormalities addressed and replaced. 

Medication list reviewed.  Heart rate remains mid 50s.  Continue as is.


September 28: On ventilator.  Full code.  Heart rate low.  Will discontinue 

Midodrin.  Continue to rest.  Electrolytes within normal limit.  Discussed with 

RN.


September 27: Remains intubated.  Full code.  No plan for tracheostomy yet.  

Labs reviewed.  All acceptable.  Continue same management


September 26: Labs reviewed.  Discussed with RN.  Potassium and phosphorus and 

magnesium replacement ordered.  Hemoglobin 9.5.  Patient remains full code.  

Continue per consultants.


September 25: Lab reviewed.  Renal parameters stable.  Full code.  Intubated.  

Electrolyte abnormalities addressed and replacement done.


September 24: Lab reviewed.  Renal parameters stable.  Full code.  Intubated.  

Has right side chest tube.  Continue per consultants.


September 23: Lab reviewed.  Renal parameters stable.  Full code.  Has right 

chest tube.  Planning process for tracheostomy.  Defer to chest and general 

surgeon.


September 22: Labs reviewed.  Renal parameters stable.  Remains full code.  

Remains on ventilator.  Due for tracheostomy tomorrow.  Continue per 

consultants.  Patient has a right chest tube in place at this time.


September 21: Labs reviewed.  Electrolyte imbalances addressed and supplemented.

 Remains full code and on ventilator.  Continue to monitor renal parameters.  

Continue per consultants.


September 20: Labs reviewed.  Serum potassium again low today.  Potassium 

supplement IV and through GT given.  Patient remains full code and is on 

ventilator.  Continue per consultants.  Continue to monitor electrolytes and 

renal parameters.


September 19: Labs reviewed.  Abnormal electrolyte addressed.  Remains full 

code.  Remains vented.


September 18: Day 27 of hospitalization.  Full code.  Labs reviewed.  Hemoglobin

down to 7.5.  Electrolyte abnormalities addressed and corrections ordered.  

Continue to monitor renal parameters.  Continue per consultants.  Start on 

Levemir for blood sugar management.  Questioning continuation of hydrocortisone?


September 17: Labs reviewed.  Potassium, phosphorus, hemoglobin, are all low.  

Potassium and phosphorus IV replacement given.  Continue to monitor electrolytes

and CBC.  Patient remains full code.


September 16: Lab reviewed.  Low phosphorus low magnesium and low potassium was 

addressed.  Hemoglobin drifting lower.  Continue per consultants.  Patient 

remains full code.


September 15: Labs reviewed.  Patient continues to be on ventilator.  D5W for 

high sodium and also potassium chloride intravenously as supplement given.  

Hemoglobin 8.4.  Continue to monitor electrolytes and renal parameters.


September 14: Labs reviewed.  Low potassium and high sodium noted.  Hemoglobin 

8.1 stable.  Aim to correct abnormal electrolyte.  Continue rest.  Will give 2 

boluses of D5W 500 cc.


September 13: Lab reviewed.  Abnormal electrolytes noted and addressed.


September 12: Labs reviewed.  Potassium supplement given.  Patient remains full 

code.  Continue per consultants.


September 11: Lab reviewed.  Electrolyte abnormalities addressed.  Continue per 

pulmonary and ID.


September 10: Lab reviewed.  Status unchanged.  Serum sodium 151 unchanged.  

Stable from renal standpoint of view.


September 9: Labs reviewed.  Status quo.  D5W 500 cc IV ordered.  Continue to 

monitor renal parameters.


September 8: Status quo.  Labs reviewed.  Overall condition unchanged.  Patient 

was transfused and hemoglobin higher.  Continue current management.  Patient 

remains full code.


September 7: Status quo.  Overall condition poor.  Very low albumin.  Edematous.

 Hypotensive.  Hemoglobin lower.  Anemia work-up ordered.  I favor transfusion 2

units of packed RBCs.  Patient remains full code.  I favor supportive care only.

 Will discuss.


September 6: Electrolyte abnormalities addressed.  Serum creatinine lower.  

Continue per current management.


September 5: Status unchanged.  Lab reviewed.  Serum potassium 2.7.  IV 

potassium chloride ordered.  Serum creatinine low at 1.6 stable.  Blood pressure

90s systolic


September 4: Status quo.  Labs reviewed.  Renal parameters stable.  Serum 

creatinine down to 1.6.  Medication list reviewed.  Continues to be on 

midodrine.  Continue per consultants.


September 3: Status quo.  Labs reviewed.  Electrolytes adjusted.  Serum 

creatinine down to 1.8.  Continue per consultants.


September 2: Status quo.  Labs reviewed.  Phosphorus supplement IV given.  Serum

creatinine 2.  Continue per consultants.


September 1: Requires less pressors.  Albumin bolus given.  1 dose of Lasix IV 

ordered as the patient severely edematous.  Patient serum albumin is very low.  

Continue per consultants.


August 31: Continues to be intubated.  Labs reviewed.  Serum creatinine 1.9 

unchanged.  Blood pressure more stable.  Off 1 of the pressors.  Continue to 

monitor renal parameters.  Continue per consultants.  Patient now on 

hydrocortisone 100 mg every 8 hours.  Will decrease IV fluid.  Normal saline 

down to 50 cc an hour.


August 30: Intubated.  Labs reviewed.  Creatinine 1.9 unchanged.  Continue same 

treatment plan.  Per consultants.  Overall poor prognosis since the patient 

remains on pressors and her pulmonary status is worsening.


August 29: Remains intubated.  Labs reviewed.  Creatinine 1.9.  Blood pressure 

systolic 90s.  Continue per consultants.


August 28: Remains intubated.  Labs reviewed.  Serum creatinine lower to 2.  

Vancomycin level lower.  Remains hypotensive on pressors.  Will increase 

midodrine to 10 mg every 8 hours.  Continue per consultants.  Continue to 

monitor renal parameters.


August 27: Patient now in ICU.  Intubated.  On pressors.  Labs reviewed.  Will 

increase midodrine.  Aim to keep blood pressure over 100 systolic.  Will give 

albumin bolus.  Will check vancomycin level which was elevated when checked 

previously on August 24.  Will monitor renal parameters.  Continue per 

consultants.





Subjective


ROS Limited/Unobtainable:  Yes





Objective


Objective





Last 24 Hour Vital Signs








  Date Time  Temp Pulse Resp B/P (MAP) Pulse Ox O2 Delivery O2 Flow Rate FiO2


 


10/16/20 11:50 98.8 50 20 113/62 (79) 93   


 


10/16/20 11:20  52 20     40


 


10/16/20 09:53        40


 


10/16/20 09:00        30


 


10/16/20 08:00 98.1 53 20 126/64 (84) 99   


 


10/16/20 08:00        30


 


10/16/20 08:00  49      


 


10/16/20 08:00      Mechanical Ventilator  





      Mechanical Ventilator  





      Mechanical Ventilator  


 


10/16/20 07:00  50 20     30


 


10/16/20 04:00 96.9 55 20 125/70 (88) 97   


 


10/16/20 04:00        40


 


10/16/20 04:00      Mechanical Ventilator  





      Mechanical Ventilator  





      Mechanical Ventilator  


 


10/16/20 03:49  44      


 


10/16/20 02:16  54 20     40


 


10/16/20 01:21  137      


 


10/16/20 00:00      Mechanical Ventilator  





      Mechanical Ventilator  





      Mechanical Ventilator  


 


10/16/20 00:00        40


 


10/16/20 00:00 97.7 48 22 114/68 (83) 97   


 


10/16/20 00:00  41      


 


10/15/20 22:32  50 20     40


 


10/15/20 20:00  72      


 


10/15/20 20:00      Mechanical Ventilator  





      Mechanical Ventilator  





      Mechanical Ventilator  


 


10/15/20 20:00 96.4 59 20 127/71 (89) 98   


 


10/15/20 20:00        40


 


10/15/20 19:02  51 20     40


 


10/15/20 16:00      Mechanical Ventilator  





      Mechanical Ventilator  





      Mechanical Ventilator  


 


10/15/20 16:00  72      


 


10/15/20 16:00        45


 


10/15/20 16:00 96.1 59 18 118/65 (82) 98   


 


10/15/20 15:20  52 21     40

















Intake and Output  


 


 10/15/20 10/16/20





 19:00 07:00


 


Intake Total 1040 ml 600 ml


 


Output Total 900 ml 950 ml


 


Balance 140 ml -350 ml


 


  


 


Free Water 200 ml 100 ml


 


Tube Feeding 600 ml 500 ml


 


Other 240 ml 


 


Output Urine Total 900 ml 950 ml


 


# Bowel Movements  1








Laboratory Tests


10/15/20 17:53: POC Whole Blood Glucose 129H


10/15/20 20:04: POC Whole Blood Glucose 106


10/16/20 00:55: POC Whole Blood Glucose 115H


10/16/20 04:00: 


White Blood Count 9.1, Red Blood Count 2.79L, Hemoglobin 8.7L, Hematocrit 25.8L,

Mean Corpuscular Volume 92, Mean Corpuscular Hemoglobin 31.3H, Mean Corpuscular 

Hemoglobin Concent 33.8, Red Cell Distribution Width 17.4H, Platelet Count 106L,

Mean Platelet Volume 8.4, Neutrophils (%) (Auto) 56.0, Lymphocytes (%) (Auto) 

34.2, Monocytes (%) (Auto) 7.6, Eosinophils (%) (Auto) 1.1, Basophils (%) (Auto)

1.0, Sodium Level 143, Potassium Level 3.8, Chloride Level 111H, Carbon Dioxide 

Level 28, Anion Gap 4L, Blood Urea Nitrogen 16, Creatinine 0.6, Estimat 

Glomerular Filtration Rate > 60, Glucose Level 120H, Calcium Level 7.4L, Total 

Bilirubin 0.4, Aspartate Amino Transf (AST/SGOT) 25, Alanine Aminotransferase 

(ALT/SGPT) 37, Alkaline Phosphatase 70, Pro-B-Type Natriuretic Peptide 970H, 

Total Protein 4.7L, Albumin 1.3L, Globulin 3.4, Albumin/Globulin Ratio 0.4L


10/16/20 05:44: POC Whole Blood Glucose 100


10/16/20 11:48: POC Whole Blood Glucose 123H


Height (Feet):  5


Height (Inches):  3.00


Weight (Pounds):  173


General Appearance:  no apparent distress


EENT:  other - Trach to vent


Cardiovascular:  bradycardia


Respiratory/Chest:  decreased breath sounds


Abdomen:  distended











Lam Nino MD            Oct 16, 2020 12:23

## 2020-10-16 NOTE — GENERAL PROGRESS NOTE
Subjective


ROS Limited/Unobtainable:  No


Allergies:  


Coded Allergies:  


     No Known Allergies (Unverified , 1/8/19)





Objective





Last 24 Hour Vital Signs








  Date Time  Temp Pulse Resp B/P (MAP) Pulse Ox O2 Delivery O2 Flow Rate FiO2


 


10/16/20 08:00 98.1 53 20 126/64 (84) 99   


 


10/16/20 08:00        30


 


10/16/20 08:00  49      


 


10/16/20 08:00      Mechanical Ventilator  





      Mechanical Ventilator  





      Mechanical Ventilator  


 


10/16/20 07:00  50 20     30


 


10/16/20 04:00 96.9 55 20 125/70 (88) 97   


 


10/16/20 04:00        40


 


10/16/20 04:00      Mechanical Ventilator  





      Mechanical Ventilator  





      Mechanical Ventilator  


 


10/16/20 03:49  44      


 


10/16/20 02:16  54 20     40


 


10/16/20 01:21  137      


 


10/16/20 00:00      Mechanical Ventilator  





      Mechanical Ventilator  





      Mechanical Ventilator  


 


10/16/20 00:00        40


 


10/16/20 00:00 97.7 48 22 114/68 (83) 97   


 


10/16/20 00:00  41      


 


10/15/20 22:32  50 20     40


 


10/15/20 20:00  72      


 


10/15/20 20:00      Mechanical Ventilator  





      Mechanical Ventilator  





      Mechanical Ventilator  


 


10/15/20 20:00 96.4 59 20 127/71 (89) 98   


 


10/15/20 20:00        40


 


10/15/20 19:02  51 20     40


 


10/15/20 16:00      Mechanical Ventilator  





      Mechanical Ventilator  





      Mechanical Ventilator  


 


10/15/20 16:00  72      


 


10/15/20 16:00        45


 


10/15/20 16:00 96.1 59 18 118/65 (82) 98   


 


10/15/20 15:20  52 21     40


 


10/15/20 12:01      Mechanical Ventilator  





      Mechanical Ventilator  





      Mechanical Ventilator  


 


10/15/20 12:00        45


 


10/15/20 12:00  51      


 


10/15/20 11:41 97.9 51 18 111/50 (70) 96   


 


10/15/20 11:29  49 20     45

















Intake and Output  


 


 10/15/20 10/16/20





 19:00 07:00


 


Intake Total 1040 ml 600 ml


 


Output Total 900 ml 950 ml


 


Balance 140 ml -350 ml


 


  


 


Free Water 200 ml 100 ml


 


Tube Feeding 600 ml 500 ml


 


Other 240 ml 


 


Output Urine Total 900 ml 950 ml


 


# Bowel Movements  1








Laboratory Tests


10/15/20 11:40: POC Whole Blood Glucose [Pending]


10/15/20 17:53: POC Whole Blood Glucose 129H


10/15/20 20:04: POC Whole Blood Glucose 106


10/16/20 00:55: POC Whole Blood Glucose 115H


10/16/20 04:00: 


White Blood Count 9.1, Red Blood Count 2.79L, Hemoglobin 8.7L, Hematocrit 25.8L,

 Mean Corpuscular Volume 92, Mean Corpuscular Hemoglobin 31.3H, Mean Corpuscular

 Hemoglobin Concent 33.8, Red Cell Distribution Width 17.4H, Platelet Count 106L

, Mean Platelet Volume 8.4, Neutrophils (%) (Auto) 56.0, Lymphocytes (%) (Auto) 

34.2, Monocytes (%) (Auto) 7.6, Eosinophils (%) (Auto) 1.1, Basophils (%) (Auto)

 1.0, Sodium Level 143, Potassium Level 3.8, Chloride Level 111H, Carbon Dioxide

 Level 28, Anion Gap 4L, Blood Urea Nitrogen 16, Creatinine 0.6, Estimat 

Glomerular Filtration Rate > 60, Glucose Level 120H, Calcium Level 7.4L, Total 

Bilirubin 0.4, Aspartate Amino Transf (AST/SGOT) 25, Alanine Aminotransferase 

(ALT/SGPT) 37, Alkaline Phosphatase 70, Pro-B-Type Natriuretic Peptide 970H, 

Total Protein 4.7L, Albumin 1.3L, Globulin 3.4, Albumin/Globulin Ratio 0.4L


10/16/20 05:44: POC Whole Blood Glucose 100


Height (Feet):  5


Height (Inches):  3.00


Weight (Pounds):  173


General Appearance:  no apparent distress


EENT:  normal ENT inspection


Neck:  supple


Cardiovascular:  normal rate


Respiratory/Chest:  decreased breath sounds


Abdomen:  normal bowel sounds, non tender, soft


Extremities:  non-tender





Assessment/Plan


Status:  stable, unchanged


Assessment/Plan:


1. History of Down syndrome.


2. Dysphagia with G-tube.


3. Seizure disorder.


4. Hypothyroidism.


5. DAVID.


6. Pneumonia.


7. Sepsis.








fu H&H


prn blood transfusion to keep HGB above 7


s/p blood transfusion


ppi


GTF


hold GI procedures for now











Ted Nagy MD             Oct 16, 2020 09:47

## 2020-10-18 NOTE — DISCHARGE SUMMARY
Discharge Summary


Discharge Summary


_


DATE OF ADMISSION: 08/22/2020


DATE OF DISCHARGE: 10/16/2020





DISCHARGED BY: Dr. Chano Cherry





CONSULTANTS: 


Dr. Constantine Nagy





[facility] COURSE:


Patient is a 59-year-old female with history of Down syndrome, seizure disorder,

hypothyroidism, and dysphagia with PEG tube, presented to ED due to complaints 

of shortness of breath.  Patient is a resident of [facility].  According to the staff at the clinic in the left hand, 

patient began to experience shortness of breath on 08/22/2020.  Patient 

presented to [place] emergency room.  She was found to be hypoxic with O2 

saturation down in the 80s.  Chest x-ray revealed bilateral interstitial 

opacities consistent with pneumonia.  Blood work did not show any leukocytosis. 

Hemoglobin and hematocrit were stable.  Electrolytes were normal.  COVID-19 was 

negative.





Patient was admitted for pneumonia.  She was placed empirically on vancomycin 

and ceftriaxone.  She was placed on seizure precautions.  She was continued on 

Keppra and Depakote.  She was given Synthroid for hypothyroidism.  She was 

resumed on G-tube feeding.  She was given supplemental O2.





Pulmonologist and infectious disease specialist were consulted.  Antibiotic was 

changed to vancomycin and Zosyn.  She was given Zithromax for atypical coverage.





On 08/26/2020, patient had a CODE BLUE due to low blood pressure.  Creatinine 

pilar to 1.8.  She was placed on BiPAP support.  She was given IV fluids and 

albumin.  She was started on midodrine.  Patient was transferred to ICU.





The following day, patient was dyspneic with increased work of breathing.  She 

was hypoxic to low 90s percentile on BiPAP.  Patient was orally intubated and a 

central line was inserted to the left femoral vein for IV pressors.





Patient has persistent high-grade bacteremia.  4/4 blood culture sets showed 

growth of staph hemolyticus.  Echocardiogram did not show any vegetations.  

Acute kidney injury was worsening.  Antibiotic was changed to daptomycin, 

meropenem and micafungin.  Urine culture did not isolate any growth.





Stool OB was positive.  Subcutaneous Heparin was discontinued.  Patient was g

iven SCDs.





COVID-19 testing x3 were negative.  She was started on IV hydrocortisone.





Troponin was elevated to 0.057.  EKG showed sinus rhythm with right bundle 

branch block.  Patient did not have any previous history of cardiac disease.  

Echocardiogram on admission showed normal left ventricular function with EF 65%,

no significant valve lesions.  Elevated troponin likely due to demand ischemia 

in the setting of respiratory failure and hypotension.





Podiatrist was consulted for fungal infection of bilateral feet.  Advised 

Chlortrimazole cream.  There was laceration on the tip of the right second toe. 

Applied Betadine.





Surveillance chest x-ray showed consolidation on the left upper lobe.  Patient 

had moderate to large bilateral pleural effusion.





Antibiotics were adjusted.  She was continued on meropenem.  She was started 

empirically on amikacin.  DC daptomycin and micafungin.  DC linezolid.  Repeat 

cultures were done.





Patient has anemia with no overt evidence of GI bleed.  Stool OB was positive 

x1.  She was given blood transfusion.  She was given PPI.





Patient remained intubated.  She was eventually taken off IV pressors.  She was 

observed off antibiotics.





On 09/21/2020, ED physician was called to evaluate ET tube.  ET was not 

achieving good peak pressure.  ET was replaced.  X-ray postprocedure showed 

tension pneumothorax on the right side.  Thora vent did not help.  Chest tube w

as inserted to the right midaxillary line.  Chest tube was connected to low 

continuous suction.  PEEP was turned down to low setting.  Repeat chest x-ray 

showed complete reexpansion of the right lung without evidence of residual 

pneumothorax.





Hemoglobin dropped to 7.8.  She was given blood transfusion.  She was continued 

on proton pump inhibitors. 





On September 30, patient stented tracheal tube again deflated.  ED physician was

called.  And a new 7.5 endotracheal tube was replaced.  A left chest vent was 

inserted by radiology.





On October 2, patient again had cuff leak.  Patient was reintubated.  Post 

procedure, x-ray showed left sided pneumothorax.





Tracheostomy is indicated.  Patient is unable to make decisions.  CODE STATUS 

and tracheostomy consent sent to public guardian.  Decision was made to keep the

patient full code.  Consent was given for tracheostomy.





On 10/8/2020, she finally underwent tracheostomy.  She tolerated procedure well.





Left chest CT was placed to water seal 10/10.  Chest x-ray showed diffuse 

airspace opacities.





Left chest tube was eventually removed on October 12.





Respiratory parameters were stable.  Patient was eventually discharged to 

[facility].





FINAL DIAGNOSES: 


Sepsis


Status post cardiac arrest on 08/26


Acute hypoxic respiratory failure status post tracheostomy


Bilateral pneumothorax status post chest tube placement and removal


Persistent high-grade bacteremia


Acute kidney injury


Drop in hemoglobin requiring blood transfusion


Elevated troponin due to demand


Sinus bradycardia


Thrombocytopenia, resolved


Hyponatremia


Down syndrome


Seizure disorder


Hypothyroidism


Diabetes mellitus


Nursing home resident





DISPOSITION: Patient was discharged to SNF.





I have been assigned to complete a discharge summary on this account, I was not 

involved with the patient's management.--CAMERON López Jacqueline Robles NP   Oct 18, 2020 18:25

## 2020-10-18 NOTE — DISCHARGE SUMMARY
Discharge Summary


Discharge Summary


_


DATE OF ADMISSION: 08/22/2020


DATE OF DISCHARGE: 10/16/2020





DISCHARGED BY: Dr. Chano Cherry





CONSULTANTS: 


Dr. Constantine Nagy





[facility] COURSE:


Patient is a 59-year-old female with history of Down syndrome, seizure disorder,

hypothyroidism, and dysphagia with PEG tube, presented to ED due to complaints 

of shortness of breath.  Patient is a resident of [facility].  According to the staff at the clinic in the left hand, 

patient began to experience shortness of breath on 08/22/2020.  Patient 

presented to [place] emergency room.  She was found to be hypoxic with O2 

saturation down in the 80s.  Chest x-ray revealed bilateral interstitial 

opacities consistent with pneumonia.  Blood work did not show any leukocytosis. 

Hemoglobin and hematocrit were stable.  Electrolytes were normal.  COVID-19 was 

negative.





Patient was admitted for pneumonia.  She was placed empirically on vancomycin 

and ceftriaxone.  She was placed on seizure precautions.  She was continued on 

Keppra and Depakote.  She was given Synthroid for hypothyroidism.  She was 

resumed on G-tube feeding.  She was given supplemental O2.





Pulmonologist and infectious disease specialist were consulted.  Antibiotic was 

changed to vancomycin and Zosyn.  She was given Zithromax for atypical coverage.





On 08/26/2020, patient had a CODE BLUE due to low blood pressure.  Creatinine 

pilar to 1.8.  She was placed on BiPAP support.  She was given IV fluids and 

albumin.  She was started on midodrine.  Patient was transferred to ICU.





The following day, patient was dyspneic with increased work of breathing.  She 

was hypoxic to low 90s percentile on BiPAP.  Patient was orally intubated and a 

central line was inserted to the left femoral vein for IV pressors.





Patient has persistent high-grade bacteremia.  4/4 blood culture sets showed 

growth of staph hemolyticus.  Echocardiogram did not show any vegetations.  

Acute kidney injury was worsening.  Antibiotic was changed to daptomycin, 

meropenem and micafungin.  Urine culture did not isolate any growth.





Stool OB was positive.  Subcutaneous Heparin was discontinued.  Patient was g

iven SCDs.











FINAL DIAGNOSES: 





DISPOSITION: 





I have been assigned to complete a discharge summary on this account, I was not 

involved with the patient's management.--CAMERON López Jacqueline Robles NP   Oct 18, 2020 17:00

## 2021-01-08 NOTE — NUR
NURSE NOTES:

COMPLETE BED BATH Patient is resting comfortably       Ochoa JOSÉ MIGUEL Berry  01/08/21 [id number]

## 2023-07-30 NOTE — PULMONOLGY CRITICAL CARE NOTE
Critical Care - Asmt/Plan


Problems:  


(1) Acute respiratory failure


(2) Bacteremia


(3) Pneumonia


(4) Sepsis


(5) HCAP (healthcare-associated pneumonia)


(6) Seizure disorder


(7) Down's syndrome


Respiratory:  monitor respiratory rate, adjust FIO2, CXR


Cardiac:  continue pressors, continue to monitor HR/BP


Renal:  F/U I&O, check electrolytes


Infectious Disease:  check cultures


Gastrointestinal:  continue feedings/current rate, hold feedings


Endocrine:  monitor blood sugar


Hematologic:  monitor H/H


Neurologic:  PRN Ativan


Affect:  PRN ativan


Notes Reviewed:  internist, renal


Discussed with:  nurses, consultants, 





Critical Care - Objective





Last 24 Hour Vital Signs








  Date Time  Temp Pulse Resp B/P (MAP) Pulse Ox O2 Delivery O2 Flow Rate FiO2


 


8/31/20 09:31  63 30     100


 


8/31/20 09:30  71 30 114/79 (91) 100   


 


8/31/20 09:00  74 30 108/76 (87) 100   


 


8/31/20 08:30  70 30 133/62 (85) 100   


 


8/31/20 08:10    150/64    


 


8/31/20 08:00        100


 


8/31/20 08:00  73      


 


8/31/20 08:00 97.8 72 30 146/63 (90) 100   


 


8/31/20 08:00      Mechanical Ventilator  





      Mechanical Ventilator  


 


8/31/20 07:30  76 30 143/63 (89) 99   


 


8/31/20 07:04  95 30     100


 


8/31/20 07:00  77 30 148/61 (90) 99   


 


8/31/20 06:45  77 30 134/78 (96) 99   


 


8/31/20 06:30  82 30 130/60 (83) 100   


 


8/31/20 06:30    130/60    


 


8/31/20 06:20  81 30 149/49 (82) 100   


 


8/31/20 06:15  82 30 149/108 (122) 100   


 


8/31/20 06:15    149/108    


 


8/31/20 06:00    136/77    


 


8/31/20 06:00  97 31 136/77 (96) 97   


 


8/31/20 05:34  107 32     100


 


8/31/20 05:30  96 29 115/75 (88) 92   


 


8/31/20 05:00  82 30 159/96 (117) 98   


 


8/31/20 05:00    159/96    


 


8/31/20 04:30  85 30 138/93 (108) 95   


 


8/31/20 04:00        100


 


8/31/20 04:00 98.9 82 29 113/96 (102) 98   


 


8/31/20 04:00  75      


 


8/31/20 04:00    113/96    


 


8/31/20 04:00      Mechanical Ventilator  





      Mechanical Ventilator  


 


8/31/20 03:45  78 30 130/76 (94) 100   


 


8/31/20 03:30  73 30     100


 


8/31/20 03:30  73 30 149/69 (95) 100   


 


8/31/20 03:15  78 30 132/57 (82) 100   


 


8/31/20 03:04  89 27 119/103 (108) 99   


 


8/31/20 03:00    123/106    


 


8/31/20 03:00  88 29 123/106 (112) 96   


 


8/31/20 02:45  88 23 110/87 (95) 93   


 


8/31/20 02:30  68 30 124/52 (76) 99   


 


8/31/20 02:15  81 29 106/75 (85) 100   


 


8/31/20 02:00  78 30 107/66 (80) 100   


 


8/31/20 02:00    107/66    


 


8/31/20 01:49  84  129/85    


 


8/31/20 01:45  82 30 103/90 (94) 99   


 


8/31/20 01:30  90 27 129/85 (100) 97   


 


8/31/20 01:15  82 30 115/54 (74) 100   


 


8/31/20 01:00    117/60    


 


8/31/20 01:00  79 30 117/60 (79) 99   


 


8/31/20 00:50  79 30     100


 


8/31/20 00:45  87 30 98/84 (89) 97   


 


8/31/20 00:30  83 29 113/97 (102) 100   


 


8/31/20 00:15  79 30 104/88 (93) 100   


 


8/31/20 00:00      Mechanical Ventilator  





      Mechanical Ventilator  


 


8/31/20 00:00  87      


 


8/31/20 00:00    105/85    


 


8/31/20 00:00 99.0 87 28 105/85 (92) 96   


 


8/31/20 00:00        100


 


8/30/20 23:45  87 30 112/94 (100) 100   


 


8/30/20 23:30  88 29 109/70 (83) 98   


 


8/30/20 23:15  94 26 97/51 (66) 97   


 


8/30/20 23:00  83 29 109/69 (82) 99   


 


8/30/20 23:00    109/69    


 


8/30/20 22:55  83 30     100


 


8/30/20 22:47    106/57    


 


8/30/20 22:45  81 30 106/57 (73) 99   


 


8/30/20 22:30  85 30 91/67 (75) 99   


 


8/30/20 22:15  89 28 91/66 (74) 94   


 


8/30/20 22:00    103/89    


 


8/30/20 22:00  85 26 103/89 (94) 99   


 


8/30/20 21:45  80 30 83/65 (71) 100   


 


8/30/20 21:30  79 30 107/44 (65) 99   


 


8/30/20 21:21  83 30 99/60 (73) 100   


 


8/30/20 21:15  83 30 73/38 (50) 99   


 


8/30/20 21:15    73/38    


 


8/30/20 21:10  87 30 97/53 (68) 93   


 


8/30/20 21:00    88/63    


 


8/30/20 21:00  94 26 88/63 (71) 94   


 


8/30/20 20:45  86 28 88/66 (73) 100   


 


8/30/20 20:36  80 30     100


 


8/30/20 20:30  84 30 93/51 (65) 100   


 


8/30/20 20:15  80 30 102/59 (73) 100   


 


8/30/20 20:00      Mechanical Ventilator  





      Mechanical Ventilator  


 


8/30/20 20:00    106/71    


 


8/30/20 20:00        100


 


8/30/20 20:00 98.5 82 30 106/71 (83) 100   


 


8/30/20 19:45  82 30 93/69 (77) 99   


 


8/30/20 19:30  85 28 117/91 (100) 100   


 


8/30/20 19:20  81      


 


8/30/20 19:07  80 30  100 Mechanical Ventilator  100





  84 30     100


 


8/30/20 19:00    101/53    


 


8/30/20 19:00  80 30 98/65 (76) 100   


 


8/30/20 18:45    98/65    


 


8/30/20 18:30  84 30 93/60 (71) 100   


 


8/30/20 18:30    103/65    


 


8/30/20 18:15    93/60    


 


8/30/20 18:00  91 30 90/54 (66) 98   


 


8/30/20 18:00    90/54    


 


8/30/20 17:30 98.6 99 26 117/96 (103) 93   


 


8/30/20 17:18  89 30     100


 


8/30/20 17:00    93/73    


 


8/30/20 17:00  88 30 91/29 (49) 95   


 


8/30/20 16:30  87 30 100/69 (79) 96   


 


8/30/20 16:00      Mechanical Ventilator  





      Mechanical Ventilator  


 


8/30/20 16:00        100


 


8/30/20 16:00  97 30 123/39 (67) 97   


 


8/30/20 16:00    123/39    


 


8/30/20 16:00  88      


 


8/30/20 15:30  77 30 125/48 (73) 99   


 


8/30/20 15:29  69 30  98 Mechanical Ventilator  100





  78 30     100


 


8/30/20 15:00    131/62    


 


8/30/20 15:00  73 30 131/62 (85) 98   


 


8/30/20 14:45    130/50    


 


8/30/20 14:30  73 30 131/32 (65) 98   


 


8/30/20 14:30    131/32    


 


8/30/20 14:15    113/64    


 


8/30/20 14:00  73 30 113/64 (80) 98   


 


8/30/20 14:00    112/86    


 


8/30/20 14:00  75  110/61    


 


8/30/20 13:30  75 30 67/14 (31) 98   


 


8/30/20 13:06  71 30     100


 


8/30/20 13:04    126/62    


 


8/30/20 13:00    126/62    


 


8/30/20 13:00  72 30 126/62 (83) 98   


 


8/30/20 12:30  72 30 138/62 (87) 98   


 


8/30/20 12:00        100


 


8/30/20 12:00      Mechanical Ventilator  





      Mechanical Ventilator  


 


8/30/20 12:00 98.3 72 30 110/45 (66) 98   


 


8/30/20 12:00    96/68    


 


8/30/20 12:00  69      


 


8/30/20 11:30  74 30     100


 


8/30/20 11:28  81 30 113/92 (99) 99   








Status:  sedated


Condition:  critical, grave


HEENT:  atraumatic


Neck:  full ROM


Heart:  HR/BP stable, HR/BP unstable


Abdomen:  non-tender, feeding tube


Extremities:  edema


Decubiti:  location


Micro:





Microbiology








 Date/Time


Source Procedure


Growth Status


 


 


 8/29/20 11:50


Blood Blood Culture - Preliminary


NO GROWTH AFTER 24 HOURS Resulted


 


 8/29/20 11:40


Blood Blood Culture - Preliminary


NO GROWTH AFTER 24 HOURS Resulted





 8/29/20 09:30


Abdomen Gram Stain - Final Resulted


 


 8/29/20 09:30 Wound Culture - Preliminary


Yeast Species Resulted








Accucheck:  131





Critical Care - Subjective


Condition:  critical, unchanged


FI02:  100


Vent Support Breath Rate:  30


Vent Support Mode:  AC


Vent Tidal Volume:  500


Sputum Amount:  Scant


PEEP:  5.0


PIP:  37


Tube Feeding Amount:  20


I&O:











Intake and Output  


 


 8/30/20 8/31/20





 19:00 07:00


 


Intake Total 2894.80 ml 2280.920 ml


 


Output Total 1130 ml 910 ml


 


Balance 1764.80 ml 1370.920 ml


 


  


 


Free Water 30 ml 


 


IV Total 2744.80 ml 2140.920 ml


 


Tube Feeding 120 ml 60 ml


 


Other  80 ml


 


Output Urine Total 1130 ml 910 ml


 


# Bowel Movements 1 








ET-Tube:  7.5


ET Position:  22


Labs:





Laboratory Tests








Test


  8/31/20


06:09 8/31/20


07:04


 


White Blood Count


  16.4 K/UL


(4.8-10.8)  H 


 


 


Red Blood Count


  3.62 M/UL


(4.20-5.40)  L 


 


 


Hemoglobin


  12.3 G/DL


(12.0-16.0) 


 


 


Hematocrit


  37.9 %


(37.0-47.0) 


 


 


Mean Corpuscular Volume


  105 FL (80-99)


H 


 


 


Mean Corpuscular Hemoglobin


  34.1 PG


(27.0-31.0)  H 


 


 


Mean Corpuscular Hemoglobin


Concent 32.5 G/DL


(32.0-36.0) 


 


 


Red Cell Distribution Width


  13.6 %


(11.6-14.8) 


 


 


Platelet Count


  134 K/UL


(150-450)  L 


 


 


Mean Platelet Volume


  7.2 FL


(6.5-10.1) 


 


 


Neutrophils (%) (Auto)


  % (45.0-75.0)


  


 


 


Lymphocytes (%) (Auto)


  % (20.0-45.0)


  


 


 


Monocytes (%) (Auto)  % (1.0-10.0)   


 


Eosinophils (%) (Auto)  % (0.0-3.0)   


 


Basophils (%) (Auto)  % (0.0-2.0)   


 


Differential Total Cells


Counted 100  


  


 


 


Neutrophils % (Manual) 85 % (45-75)  H 


 


Lymphocytes % (Manual) 10 % (20-45)  L 


 


Monocytes % (Manual) 5 % (1-10)   


 


Eosinophils % (Manual) 0 % (0-3)   


 


Basophils % (Manual) 0 % (0-2)   


 


Band Neutrophils 0 % (0-8)   


 


Platelet Estimate Decreased  L 


 


Platelet Morphology Normal   


 


Anisocytosis 1+   


 


Macrocytosis 1+   


 


Sodium Level


  134 MMOL/L


(136-145)  L 


 


 


Potassium Level


  4.4 MMOL/L


(3.5-5.1) 


 


 


Chloride Level


  104 MMOL/L


() 


 


 


Carbon Dioxide Level


  18 MMOL/L


(21-32)  L 


 


 


Anion Gap


  12 mmol/L


(5-15) 


 


 


Blood Urea Nitrogen


  17 mg/dL


(7-18) 


 


 


Creatinine


  1.9 MG/DL


(0.55-1.30)  H 


 


 


Estimat Glomerular Filtration


Rate 27.2 mL/min


(>60) 


 


 


Glucose Level


  142 MG/DL


()  H 


 


 


Calcium Level


  8.5 MG/DL


(8.5-10.1) 


 


 


Arterial Blood pH


  


  7.327


(7.350-7.450)


 


Arterial Blood Partial


Pressure CO2 


  27.7 mmHg


(35.0-45.0)  L


 


Arterial Blood Partial


Pressure O2 


  95.6 mmHg


(75.0-100.0)


 


Arterial Blood HCO3


  


  14.2 mmol/L


(22.0-26.0)  *L


 


Arterial Blood Oxygen


Saturation 


  97.5 %


()


 


Arterial Blood Base Excess


  


  -10.3 (-2-2)


*L


 


Cole Test  Positive  

















Chano Cherry MD Aug 31, 2020 11:12 1 pair

## 2023-12-06 NOTE — NUR
Detail Level: Detailed NURSE HAND-OFF REPORT: 



Important Events on Shift:Blood transfusion 1 unit PRBC given for H?H 7.1/27.1

Patient Status: stable

Diet: Vital AF1,2 at 50 ml/hr GT



Pending Orders: N/A

Pending Results/Labs:N/A

Pending MD notification:N/A



Latest Vital Signs: Temperature 98.1 , Pulse 60 , B/P 114 /61 , Respiratory Rate 19 , O2 SAT 
98 , Mechanical Ventilator, O2 Flow Rate 15.0 .  

Vital Sign Comment: stable



EKG Rhythm: Sinus Bradycardia

Rhythm change?: N 

MD Notified?: RENU Carver MD Response: No New Orders Received



Latest Momin Fall Score: 70  

Fall Risk: High Risk 

Safety Measures: Call light Within Reach, Bed Alarm Zone 1, Side Rails Side Rails x3, Bed 
position Low and Locked.

Fall Precautions: 

Yellow Socks



Report given to Elis Riley RN.. Detail Level: Generalized

## 2024-02-26 NOTE — NUR
NURSE HAND-OFF REPORT: 



Latest Vital Signs: Temperature 98.8 , Pulse 58 , B/P 136 /67 , Respiratory Rate 24 , O2 SAT 
98 , Mechanical Ventilator, O2 Flow Rate 15.0 .  

Vital Sign Comment: 



EKG Rhythm: Sinus Bradycardia

Rhythm change?: N 

MD Notified?: -

MD Response: 



Latest Momin Fall Score: 50  

Fall Risk: High Risk 

Safety Measures: Call light Within Reach, Bed Alarm Zone 2, Side Rails Side Rails x3, Bed 
position Low and Locked.

Fall Precautions: 

Yellow Socks

Yellow Gown

Door Sign

Patient Fall Education



Report given to Mayco TRAYLOR Yes

## 2024-04-23 NOTE — INFECTIOUS DISEASES PROG NOTE
Assessment/Plan





ASSESSMENT:


sp code blue 8/26


Septic Shock; SP


Fever, recurrent- low grade 


Leukocytosis; persistent/fluctuating, SP


     -9/19 Bcx NTD


   -9/9 u/a no pyuria


   -9/8 Bcx NTD (Picc line)


   -9/6 Bcx NTD


            ucx Neg


             sp cx C. parapsilopsis


  -8/26 u/a no pyuria





Pneumonia.- COVID 19 neg x3


   Acute hypoxic resp failure on VM> NRB 15l 100%; hypoxic on ABG> now VDRF 

8/26- Fio2 80% >100% 8/28> 60% 9/1 >80% 9/2   >95% 9/10 >90% 9/14 >60% 9/15


R tension Pneumothroax sp CT 9/21


       -9/22 CXR: Interim complete reexpansion of right lung without evidence of

residual pneumothorax. Bilateral diffuse and extensive infiltrates. Markedly 

improved chest wall subcutaneous emphysema


       -9/21 CXR: Interim reexpansion of previously demonstrated right 

pneumothorax, status post large bore chest tube placement.


      -9/14 CXR: Improved aeration of both lungs.


       -9/5 CXR:Small bilateral pleural effusions with minor edema.  Stable 

edema with  mild worsening in the degree of pleural effusion on the right.


         -9/1 sp cx Neg


         8/31 CXR: Extensive bilateral interstitial and airspace disease appears

similar to the prior exam. Moderate to large bilateral pleural effusions appear 

unchanged.


   8/28 CXR: Increasing left upper lobe dense consolidation and likely 

increasing bilateral pleural fluid. Persistent diffuse dense consolidation el

sewhere


            -8/26 sp cx normal resp lilliam


             -8/25 CXR: Increased atelectasis of the right lung, since prior e

xam of 3 days earlier. New or increased right pleural effusion. Increased left 

basilar consolidation and/or pleural fluid 


          -COVID Rapid PCR neg 8/23, 8/23, 8/26


          -8/22 spc x Group G strep


          -8/22 CXR:   Reduced lung volumes.  Patchy bilateral predominantly 

interstitial  pulmonary opacities.  Could be from edema and/or pneumonia.  There

is a broader differential.


 


        -legionella ag urine, blasto ab, Histo ab, HIV ab screen, JOY, ANCA neg





Persistent, high grade bacteremia-


     -8/22 Bcx 4/4 sets S. haemolyticus; 8/23 Bcx 3/4 S/ epi; 8/27 Bcx 1.4 S. 

warnerri; 8/29 Bcx Neg


     -2d echo: no vegetaions seen





          ua/ wbc 10-15, nit neg, leuk +1; ucx Neg





DAVID; 


 -supratherapeutic vanco levels





-Seizure disorder.


- Hypothyroidism.


- Down syndrome.





History of PEG tube placement.


[facility]








PLAN:


Monitor off abx as she is aseptic





          9/14 SP Meropenem #18, IV Amikacin #7


          9/4/20 SP Daptomycin #11


          9/2 SP MIcafungin #7, Linezolid #5


   8/28 SP Azithromycin #7/7


   8/26 SP Ceftriaxone #2


   8/25 SP IV Vancomycin #4, Zosyn #4


   8/22 SP Cefepime x1, Flagyl x1





- Monitor CBC, BMP.


.f/u cx


- COVID neg x3


- Monitor chest x-ray.


- Monitor the patient's clinical course and labs.  Based on those, we will do 

further recommendation.


-f/u Fungitell, asp ag, flow cytometry


-poor prognosis











Thank you, Dr. Cherry, for allowing me to participate in the care of


this patient.  I will follow the patient with you at this


hospitalization.








Discussed with RN





Subjective


Allergies:  


Coded Allergies:  


     No Known Allergies (Unverified , 1/8/19)





Afebrile


On Vent


No leukocytosis


DEVI





Objective





Last 24 Hour Vital Signs








  Date Time  Temp Pulse Resp B/P (MAP) Pulse Ox O2 Delivery O2 Flow Rate FiO2


 


10/3/20 09:00  70 32 126/70 (88) 99   


 


10/3/20 08:00      Mechanical Ventilator  





      Mechanical Ventilator  


 


10/3/20 08:00        50


 


10/3/20 08:00 97.7 57 30 123/61 (81) 99   


 


10/3/20 07:20  56 27     80


 


10/3/20 07:00  60 29 108/54 (72) 100   


 


10/3/20 06:00  62 30 101/66 (78) 98   


 


10/3/20 05:00  61 28 96/74 (81) 96   


 


10/3/20 04:00  64      


 


10/3/20 04:00        50


 


10/3/20 04:00 98.0 55 28 115/75 (88) 99   


 


10/3/20 04:00      Mechanical Ventilator  





      Mechanical Ventilator  


 


10/3/20 03:00  57 29 100/84 (89) 99   


 


10/3/20 02:53  57 27     80


 


10/3/20 02:00  55 29 114/91 (99) 97   


 


10/3/20 01:00  58 29 103/77 (86) 98   


 


10/3/20 00:00      Mechanical Ventilator  





      Mechanical Ventilator  


 


10/3/20 00:00  45      


 


10/3/20 00:00 98.4 45 24 122/72 (89) 100   


 


10/2/20 23:00  47 23 121/61 (81) 97   


 


10/2/20 22:53  49 26     80


 


10/2/20 22:00  62 29 127/90 (102) 99   


 


10/2/20 21:00  60 28 121/84 (96) 97   


 


10/2/20 20:00        50


 


10/2/20 20:00  67 27 113/67 (82) 98   


 


10/2/20 20:00      Mechanical Ventilator  





      Mechanical Ventilator  


 


10/2/20 19:00 98.9 58 26 115/59 (77) 98   


 


10/2/20 19:00  56 27     80


 


10/2/20 18:00  53 20 100/72 (81) 99   


 


10/2/20 18:00  57 23 100/72 (81) 98   


 


10/2/20 17:00  77 29 112/65 (81) 95   


 


10/2/20 16:00      Mechanical Ventilator  





      Mechanical Ventilator  


 


10/2/20 16:00  53 24 105/65 (78) 97   


 


10/2/20 16:00  67      


 


10/2/20 16:00        60


 


10/2/20 15:10  78 24     55


 


10/2/20 15:00  78 19 94/74 (81) 97   


 


10/2/20 14:00  69 24 104/71 (82) 94   


 


10/2/20 13:00  67 28 99/69 (79) 94   


 


10/2/20 12:40  74 28     55


 


10/2/20 12:00 98.6 55 23 107/61 (76) 95   


 


10/2/20 12:00  68      


 


10/2/20 12:00      Mechanical Ventilator  





      Mechanical Ventilator  





      Mechanical Ventilator  


 


10/2/20 12:00        60


 


10/2/20 11:05  56 27     55


 


10/2/20 11:00  53 23 106/58 (74) 94   


 


10/2/20 10:00  57 34 121/65 (83) 100   








Height (Feet):  5


Height (Inches):  3.00


Weight (Pounds):  172


GEN: NAD, On Vent 50% O2


HEENT: NCAT, Intubated, 


Pulm: Equal chest rise and fall B/L, No accessory muscle use


ABD: Soft, ND


SKIN: Exposed skin with no rash, Normal in color





Laboratory Tests








Test


 10/2/20


12:03 10/2/20


17:49 10/2/20


21:50 10/3/20


00:53


 


POC Whole Blood Glucose


 138 MG/DL


()  H 153 MG/DL


()  H 122 MG/DL


()  H 109 MG/DL


()  H


 


Test


 10/3/20


08:49 


 


 





 


POC Whole Blood Glucose


 120 MG/DL


()  H 


 


 














Current Medications








 Medications


  (Trade)  Dose


 Ordered  Sig/Didi


 Route


 PRN Reason  Start Time


 Stop Time Status Last Admin


Dose Admin


 


 Acetaminophen


  (Tylenol)  650 mg  Q4H  PRN


 GT


 For Pain  9/23/20 17:15


 10/23/20 17:14  10/2/20 17:46





 


 Chlorhexidine


 Gluconate


  (Beatrice-Hex 2%)  1 applic  DAILY@2000


 TOPIC


   8/31/20 20:00


 11/29/20 19:59  10/2/20 22:01





 


 Clotrimazole


  (Lotrimin)  1 applic  Q12HR


 TOPIC


   8/30/20 13:00


 11/28/20 12:59  10/3/20 08:50





 


 Dextrose


  (Dextrose 50%)  25 ml  Q30M  PRN


 IV


 Hypoglycemia  8/26/20 11:30


 11/20/20 11:29   





 


 Dextrose


  (Dextrose 50%)  50 ml  Q30M  PRN


 IV


 Hypoglycemia  8/26/20 11:30


 11/20/20 11:29   





 


 Hydrocortisone


  (Solu-CORTEF)  50 mg  EVERY 12  HOURS


 IV


   10/2/20 21:00


 12/27/20 20:59  10/3/20 08:50





 


 Insulin Aspart


  (NovoLOG)    Q6HR


 SUBQ


   9/18/20 12:00


 12/17/20 11:59  10/2/20 17:51





 


 Insulin Detemir


  (Levemir)  10 units  Q12HR


 SUBQ


   9/18/20 10:00


 12/17/20 09:59  10/3/20 08:51





 


 Levothyroxine


 Sodium


  (Synthroid)  75 mcg  DAILY@0630


 GT


   9/30/20 06:30


 10/30/20 06:29  10/3/20 05:04





 


 Loperamide HCl


  (Imodium)  2 mg  Q6H  PRN


 NG


 Diarrhea  9/16/20 10:30


 10/16/20 10:29  9/23/20 11:41





 


 Pantoprazole


  (Protonix)  40 mg  EVERY 12  HOURS


 IVP


   9/28/20 21:00


 10/28/20 20:59  10/3/20 08:50





 


 Potassium Chloride


  (K-Dur)  40 meq  EVERY 12  HOURS


 GT


   9/26/20 21:00


 12/19/20 12:14  10/3/20 08:50




















Elfego Mcmahon MD             Oct 3, 2020 09:41 33.5

## 2025-05-05 NOTE — INFECTIOUS DISEASES PROG NOTE
ASSESSMENT AND PLAN:  Seborrheic Dermatitis  STABLE  Seborrheic dermatitis (or “cradle cap”) is a scaly, crusty and sometimes red rash on any of the following areas:  The scalp (most commonly), behind the ears, the face (near the nose or eyebrows), or in the groin.  The cause is not well understood; however it seems to occur in areas of high sebaceous gland production.  It can also occur in an overlapping fashion with atopic dermatitis (eczema).  A natural yeast called Pityrosporum ovale which is normally present on the skin in these areas, may contribute to some of the inflammation associated with seborrheic dermatitis.  This doesn’t represent a true “yeast infection”, per se, but reducing the normal yeast counts on the skin can sometimes help to reduce the scaling and redness.  We accomplish this with certain shampoos that should be left on the skin for several minutes before rinsing off.  Continue for maintenance   - Ketoconazole( NIZORAL) Shampoo 2%; Please use to wash scalp 1-2 weekly as needed, lather leave on 5 minutes then rinse. Dispense 120 ml Refill 1    Neoplasm of uncertain behavior of skin- Rt cheek  R/o Atypia  - TANGENTIAL BIOPSY OF SKIN SINGLE LESION  - SURGICAL PATHOLOGY, DERMATOLOGIC  Patient information/ Instructions  Wound Care After Skin Biopsy  Leave bandage on for 24 hours. Then you may get the biopsy site wet in the bath or shower.    After 24 hours, gently cleanse the biopsy site each day with soap and water, pat dry, apply Vaseline  to the area until it is healed. Keeping the area uncovered will allow it to heal more quickly than if it is left open to the air.    Continue treating the biopsy site until it is fully healed.  May take 2-3 weeks for head or neck sites to fully heal.   May take 3-6 weeks for trunk or extremity (arm/leg) sites to heal.    Call our office immediately if any signs of infection occur at the biopsy site (pain, drainage, pus, or red streaks from the site) or if  Assessment/Plan





ASSESSMENT:


sp code blue 8/26


Septic Shock; SP


Fever, recurrent- low grade 


Leukocytosis; persistent/fluctuating, SP


     -9/19 Bcx NTD


   -9/9 u/a no pyuria


   -9/8 Bcx NTD (Picc line)


   -9/6 Bcx NTD


            ucx Neg


             sp cx C. parapsilopsis


  -8/26 u/a no pyuria





Pneumonia.- COVID 19 neg x3


   Acute hypoxic resp failure on VM> NRB 15l 100%; hypoxic on ABG> now VDRF 

8/26- Fio2 80% >100% 8/28> 60% 9/1 >80% 9/2   >95% 9/10 >90% 9/14 >60% 9/15


R tension Pneumothroax sp CT 9/21


       -9/22 CXR: Interim complete reexpansion of right lung without evidence of

residual pneumothorax. Bilateral diffuse and extensive infiltrates. Markedly 

improved chest wall subcutaneous emphysema


       -9/21 CXR: Interim reexpansion of previously demonstrated right 

pneumothorax, status post large bore chest tube placement.


      -9/14 CXR: Improved aeration of both lungs.


       -9/5 CXR:Small bilateral pleural effusions with minor edema.  Stable 

edema with  mild worsening in the degree of pleural effusion on the right.


         -9/1 sp cx Neg


         8/31 CXR: Extensive bilateral interstitial and airspace disease appears

similar to the prior exam. Moderate to large bilateral pleural effusions appear 

unchanged.


   8/28 CXR: Increasing left upper lobe dense consolidation and likely 

increasing bilateral pleural fluid. Persistent diffuse dense consolidation el

sewhere


            -8/26 sp cx normal resp lilliam


             -8/25 CXR: Increased atelectasis of the right lung, since prior e

xam of 3 days earlier. New or increased right pleural effusion. Increased left 

basilar consolidation and/or pleural fluid 


          -COVID Rapid PCR neg 8/23, 8/23, 8/26


          -8/22 spc x Group G strep


          -8/22 CXR:   Reduced lung volumes.  Patchy bilateral predominantly 

interstitial  pulmonary opacities.  Could be from edema and/or pneumonia.  There

is a broader differential.


 


        -legionella ag urine, blasto ab, Histo ab, HIV ab screen, JOY, ANCA neg





Persistent, high grade bacteremia-


     -8/22 Bcx 4/4 sets S. haemolyticus; 8/23 Bcx 3/4 S/ epi; 8/27 Bcx 1.4 S. 

warnerri; 8/29 Bcx Neg


     -2d echo: no vegetaions seen





          ua/ wbc 10-15, nit neg, leuk +1; ucx Neg





DAVDI; 


 -supratherapeutic vanco levels





-Seizure disorder.


- Hypothyroidism.


- Down syndrome.





History of PEG tube placement.


[facility]








PLAN:


Monitor off abx as she is stable





          9/14 SP Meropenem #18, IV Amikacin #7


          9/4/20 SP Daptomycin #11


          9/2 SP MIcafungin #7, Linezolid #5


   8/28 SP Azithromycin #7/7


   8/26 SP Ceftriaxone #2


   8/25 SP IV Vancomycin #4, Zosyn #4


   8/22 SP Cefepime x1, Flagyl x1





- Monitor CBC, BMP.


.f/u cx


- COVID neg x3


- Monitor chest x-ray.


- Monitor the patient's clinical course and labs.  Based on those, we will do 

further recommendation.


-f/u Fungitell, asp ag, flow cytometry


-poor prognosis











Thank you, Dr. Cherry, for allowing me to participate in the care of


this patient.  I will follow the patient with you at this


hospitalization.








Discussed with RN





Subjective


Allergies:  


Coded Allergies:  


     No Known Allergies (Unverified , 1/8/19)





Afebrile


On Vent 60% O2 


No leukocytosis





Objective





Last 24 Hour Vital Signs








  Date Time  Temp Pulse Resp B/P (MAP) Pulse Ox O2 Delivery O2 Flow Rate FiO2


 


10/1/20 07:06  61 24     60


 


10/1/20 06:00  59 25 123/54 (77) 96   


 


10/1/20 05:00  45 25 100/54 (69) 96   


 


10/1/20 04:00 98.8 48 24 99/56 (70) 96   


 


10/1/20 04:00        60


 


10/1/20 04:00      Mechanical Ventilator  





      Mechanical Ventilator  





      Mechanical Ventilator  


 


10/1/20 04:00  48      


 


10/1/20 03:30  61 24     60


 


10/1/20 03:00  66 23 118/72 (87) 97   


 


10/1/20 02:00  60 21 136/78 (97) 96   


 


10/1/20 01:00  63 16 133/80 (97) 94   


 


10/1/20 00:00      Mechanical Ventilator  





      Mechanical Ventilator  





      Mechanical Ventilator  


 


10/1/20 00:00        60


 


10/1/20 00:00  50      


 


10/1/20 00:00  63 19 122/74 (90) 95   


 


9/30/20 23:00  62 19 109/70 (83) 95   


 


9/30/20 22:49  62 24     60


 


9/30/20 22:00  46 28 114/77 (89) 94   


 


9/30/20 21:00  56 23 125/61 (82) 94   


 


9/30/20 20:00        60


 


9/30/20 20:00 98.5 58 25 132/76 (94) 93   


 


9/30/20 20:00      Mechanical Ventilator  





      Mechanical Ventilator  





      Mechanical Ventilator  


 


9/30/20 20:00  48      


 


9/30/20 19:15  43 24     60


 


9/30/20 19:00  52 23 137/73 (94) 94   


 


9/30/20 18:00  58 27 135/75 (95) 92   


 


9/30/20 17:00  68 32 125/83 (97) 93   


 


9/30/20 16:00 98.9 52 22 104/88 (93) 94   


 


9/30/20 16:00      Mechanical Ventilator  





      Mechanical Ventilator  





      Mechanical Ventilator  


 


9/30/20 16:00        60


 


9/30/20 15:41  52      


 


9/30/20 15:15  54 24     60


 


9/30/20 15:00  43 24 129/57 (81) 95   


 


9/30/20 14:00  43 25 118/59 (78) 94   


 


9/30/20 13:00  48 24 130/60 (83) 95   


 


9/30/20 12:00        60


 


9/30/20 12:00  46 24 152/68 (96) 96   


 


9/30/20 12:00      Mechanical Ventilator  





      Mechanical Ventilator  





      Mechanical Ventilator  


 


9/30/20 11:35  61      


 


9/30/20 11:06  61 24     60


 


9/30/20 11:00  52 21 142/93 (109) 94   








Height (Feet):  5


Height (Inches):  3.00


Weight (Pounds):  172


GEN: On Vent


HEENT: NCAT, Intubated, 


Pulm: Equal chest rise and fall B/L, No accessory muscle use


ABD: Soft, ND


SKIN: Exposed skin with no rash, Normal in color





Laboratory Tests








Test


 9/30/20


13:10 9/30/20


18:14 9/30/20


20:53 9/30/20


23:49


 


POC Whole Blood Glucose


 133 MG/DL


()  H 92 MG/DL


() Pending  


 153 MG/DL


()  H


 


Test


 10/1/20


03:50 


 


 





 


White Blood Count


 6.5 K/UL


(4.8-10.8) 


 


 





 


Red Blood Count


 2.77 M/UL


(4.20-5.40)  L 


 


 





 


Hemoglobin


 8.6 G/DL


(12.0-16.0)  L 


 


 





 


Hematocrit


 25.7 %


(37.0-47.0)  L 


 


 





 


Mean Corpuscular Volume 93 FL (80-99)     


 


Mean Corpuscular Hemoglobin


 31.1 PG


(27.0-31.0)  H 


 


 





 


Mean Corpuscular Hemoglobin


Concent 33.4 G/DL


(32.0-36.0) 


 


 





 


Red Cell Distribution Width


 16.4 %


(11.6-14.8)  H 


 


 





 


Platelet Count


 125 K/UL


(150-450)  L 


 


 





 


Mean Platelet Volume


 8.5 FL


(6.5-10.1) 


 


 





 


Neutrophils (%) (Auto)


 76.6 %


(45.0-75.0)  H 


 


 





 


Lymphocytes (%) (Auto)


 17.9 %


(20.0-45.0)  L 


 


 





 


Monocytes (%) (Auto)


 5.1 %


(1.0-10.0) 


 


 





 


Eosinophils (%) (Auto)


 0.1 %


(0.0-3.0) 


 


 





 


Basophils (%) (Auto)


 0.3 %


(0.0-2.0) 


 


 





 


Sodium Level


 145 MMOL/L


(136-145) 


 


 





 


Potassium Level


 3.2 MMOL/L


(3.5-5.1)  L 


 


 





 


Chloride Level


 112 MMOL/L


()  H 


 


 





 


Carbon Dioxide Level


 28 MMOL/L


(21-32) 


 


 





 


Anion Gap


 5 mmol/L


(5-15) 


 


 





 


Blood Urea Nitrogen


 20 mg/dL


(7-18)  H 


 


 





 


Creatinine


 0.6 MG/DL


(0.55-1.30) 


 


 





 


Estimat Glomerular Filtration


Rate > 60 mL/min


(>60) 


 


 





 


Glucose Level


 169 MG/DL


()  H 


 


 





 


Calcium Level


 7.2 MG/DL


(8.5-10.1)  L 


 


 














Current Medications








 Medications


  (Trade)  Dose


 Ordered  Sig/Didi


 Route


 PRN Reason  Start Time


 Stop Time Status Last Admin


Dose Admin


 


 Acetaminophen


  (Tylenol)  650 mg  Q4H  PRN


 GT


 For Pain  9/23/20 17:15


 10/23/20 17:14  9/29/20 17:33





 


 Chlorhexidine


 Gluconate


  (Beatrice-Hex 2%)  1 applic  DAILY@2000


 TOPIC


   8/31/20 20:00


 11/29/20 19:59  9/30/20 20:55





 


 Clotrimazole


  (Lotrimin)  1 applic  Q12HR


 TOPIC


   8/30/20 13:00


 11/28/20 12:59  10/1/20 09:01





 


 Dextrose


  (Dextrose 50%)  25 ml  Q30M  PRN


 IV


 Hypoglycemia  8/26/20 11:30


 11/20/20 11:29   





 


 Dextrose


  (Dextrose 50%)  50 ml  Q30M  PRN


 IV


 Hypoglycemia  8/26/20 11:30


 11/20/20 11:29   





 


 Hydrocortisone


  (Solu-CORTEF)  50 mg  EVERY 8  HOURS


 IV


   9/28/20 06:00


 12/27/20 05:59  10/1/20 05:57





 


 Insulin Aspart


  (NovoLOG)    Q6HR


 SUBQ


   9/18/20 12:00


 12/17/20 11:59  10/1/20 05:59





 


 Insulin Detemir


  (Levemir)  10 units  Q12HR


 SUBQ


   9/18/20 10:00


 12/17/20 09:59  10/1/20 10:00





 


 Levothyroxine


 Sodium


  (Synthroid)  75 mcg  DAILY@0630


 GT


   9/30/20 06:30


 10/30/20 06:29  10/1/20 05:58





 


 Loperamide HCl


  (Imodium)  2 mg  Q6H  PRN


 NG


 Diarrhea  9/16/20 10:30


 10/16/20 10:29  9/23/20 11:41





 


 Pantoprazole


  (Protonix)  40 mg  EVERY 12  HOURS


 IVP


   9/28/20 21:00


 10/28/20 20:59  10/1/20 08:59





 


 Potassium Chloride  100 ml @ 


 50 mls/hr  ONCE  ONCE


 IVPB


   10/1/20 09:30


 10/1/20 11:29  10/1/20 10:02





 


 Potassium Chloride


  (K-Dur)  40 meq  EVERY 12  HOURS


 GT


   9/26/20 21:00


 12/19/20 12:14  10/1/20 09:00




















Elfego Mcmahon MD             Oct 1, 2020 10:37 bleeding or excessive swelling occurs. A thick pink rim surrounding the site is normal. We can be reached at [phone].    You will be notified of results by letter or phone call (or at recheck appointment). If 3 weeks have passed since your procedure and you have not received results, please call our office.    Skin tags-back  Reassured patient lesion benign, no concern for malignancy and no malignant potential.  No treatment required. Can be removed for cosmesis $220 for 14 lesions (current pricing 2023).     Seborrheic Keratoses- Back, Supra pubic  Discussed with patient seborrheic keratoses is a benign warty lesion that increases with age.  No treatment necessary.  Can be removed for cosmesis.   Cosmetic procedures are not a benefit of health insurance plans. Payment is due in full at the time of service and  all fees are per treatment. Occasionally treatments do not improve areas treated and there is no guarantee of results.    Cosmetic procedures are not a benefit of health insurance plans. Payment is due in full at the time of service and  all fees are per treatment. Occasionally treatments do not improve areas treated and there is no guarantee of results.      For continuity of care, please schedule an annual follow-up with Dr. Rodriguez or Dr. Yoon or contact your insurance for further discussion of a dermatologist.If you experience any issues or have concerns, do not hesitate to call sooner.